# Patient Record
Sex: FEMALE | Race: WHITE | Employment: OTHER | URBAN - METROPOLITAN AREA
[De-identification: names, ages, dates, MRNs, and addresses within clinical notes are randomized per-mention and may not be internally consistent; named-entity substitution may affect disease eponyms.]

---

## 2017-01-19 DIAGNOSIS — E11.22 DM TYPE 2 CAUSING CKD STAGE 3 (HCC): ICD-10-CM

## 2017-01-19 DIAGNOSIS — N18.30 DM TYPE 2 CAUSING CKD STAGE 3 (HCC): ICD-10-CM

## 2017-01-25 RX ORDER — CELECOXIB 200 MG/1
CAPSULE ORAL
Qty: 30 CAPSULE | Refills: 1 | Status: SHIPPED | OUTPATIENT
Start: 2017-01-25 | End: 2017-02-23 | Stop reason: SDUPTHER

## 2017-02-10 ENCOUNTER — OFFICE VISIT (OUTPATIENT)
Dept: FAMILY MEDICINE CLINIC | Age: 77
End: 2017-02-10

## 2017-02-10 VITALS
OXYGEN SATURATION: 84 % | DIASTOLIC BLOOD PRESSURE: 82 MMHG | HEART RATE: 100 BPM | BODY MASS INDEX: 44.52 KG/M2 | RESPIRATION RATE: 12 BRPM | WEIGHT: 277 LBS | HEIGHT: 66 IN | SYSTOLIC BLOOD PRESSURE: 132 MMHG

## 2017-02-10 DIAGNOSIS — Z79.4 TYPE 2 DIABETES MELLITUS WITH STAGE 3 CHRONIC KIDNEY DISEASE, WITH LONG-TERM CURRENT USE OF INSULIN (HCC): ICD-10-CM

## 2017-02-10 DIAGNOSIS — E66.01 MORBID OBESITY WITH BMI OF 40.0-44.9, ADULT (HCC): ICD-10-CM

## 2017-02-10 DIAGNOSIS — E11.22 TYPE 2 DIABETES MELLITUS WITH STAGE 3 CHRONIC KIDNEY DISEASE, WITH LONG-TERM CURRENT USE OF INSULIN (HCC): ICD-10-CM

## 2017-02-10 DIAGNOSIS — J96.01 ACUTE RESPIRATORY FAILURE WITH HYPOXIA (HCC): Primary | ICD-10-CM

## 2017-02-10 DIAGNOSIS — J45.41 MODERATE PERSISTENT ASTHMA WITH ACUTE EXACERBATION: ICD-10-CM

## 2017-02-10 DIAGNOSIS — N18.30 TYPE 2 DIABETES MELLITUS WITH STAGE 3 CHRONIC KIDNEY DISEASE, WITH LONG-TERM CURRENT USE OF INSULIN (HCC): ICD-10-CM

## 2017-02-10 LAB — HBA1C MFR BLD: 6.8 %

## 2017-02-10 PROCEDURE — G8484 FLU IMMUNIZE NO ADMIN: HCPCS | Performed by: INTERNAL MEDICINE

## 2017-02-10 PROCEDURE — 3044F HG A1C LEVEL LT 7.0%: CPT | Performed by: INTERNAL MEDICINE

## 2017-02-10 PROCEDURE — 83036 HEMOGLOBIN GLYCOSYLATED A1C: CPT | Performed by: INTERNAL MEDICINE

## 2017-02-10 PROCEDURE — 96372 THER/PROPH/DIAG INJ SC/IM: CPT | Performed by: INTERNAL MEDICINE

## 2017-02-10 PROCEDURE — 1036F TOBACCO NON-USER: CPT | Performed by: INTERNAL MEDICINE

## 2017-02-10 PROCEDURE — G8427 DOCREV CUR MEDS BY ELIG CLIN: HCPCS | Performed by: INTERNAL MEDICINE

## 2017-02-10 PROCEDURE — 94640 AIRWAY INHALATION TREATMENT: CPT | Performed by: INTERNAL MEDICINE

## 2017-02-10 PROCEDURE — 3017F COLORECTAL CA SCREEN DOC REV: CPT | Performed by: INTERNAL MEDICINE

## 2017-02-10 PROCEDURE — 99215 OFFICE O/P EST HI 40 MIN: CPT | Performed by: INTERNAL MEDICINE

## 2017-02-10 PROCEDURE — G8400 PT W/DXA NO RESULTS DOC: HCPCS | Performed by: INTERNAL MEDICINE

## 2017-02-10 PROCEDURE — 1090F PRES/ABSN URINE INCON ASSESS: CPT | Performed by: INTERNAL MEDICINE

## 2017-02-10 PROCEDURE — 3014F SCREEN MAMMO DOC REV: CPT | Performed by: INTERNAL MEDICINE

## 2017-02-10 PROCEDURE — G8417 CALC BMI ABV UP PARAM F/U: HCPCS | Performed by: INTERNAL MEDICINE

## 2017-02-10 PROCEDURE — 1123F ACP DISCUSS/DSCN MKR DOCD: CPT | Performed by: INTERNAL MEDICINE

## 2017-02-10 PROCEDURE — 4040F PNEUMOC VAC/ADMIN/RCVD: CPT | Performed by: INTERNAL MEDICINE

## 2017-02-10 RX ORDER — ALBUTEROL SULFATE 2.5 MG/3ML
2.5 SOLUTION RESPIRATORY (INHALATION) ONCE
Status: DISCONTINUED | OUTPATIENT
Start: 2017-02-10 | End: 2017-02-16 | Stop reason: ALTCHOICE

## 2017-02-10 RX ORDER — METHYLPREDNISOLONE ACETATE 80 MG/ML
80 INJECTION, SUSPENSION INTRA-ARTICULAR; INTRALESIONAL; INTRAMUSCULAR; SOFT TISSUE ONCE
Status: COMPLETED | OUTPATIENT
Start: 2017-02-10 | End: 2017-02-10

## 2017-02-10 RX ADMIN — METHYLPREDNISOLONE ACETATE 80 MG: 80 INJECTION, SUSPENSION INTRA-ARTICULAR; INTRALESIONAL; INTRAMUSCULAR; SOFT TISSUE at 09:29

## 2017-02-10 ASSESSMENT — ENCOUNTER SYMPTOMS
NAUSEA: 0
SPUTUM PRODUCTION: 1
EYE DISCHARGE: 0
COUGH: 1
BACK PAIN: 1
WHEEZING: 1
SHORTNESS OF BREATH: 1
VOMITING: 0
DIARRHEA: 0
HEMOPTYSIS: 0
EYE REDNESS: 0

## 2017-02-15 ENCOUNTER — EPISODE CHANGES (OUTPATIENT)
Dept: CASE MANAGEMENT | Age: 77
End: 2017-02-15

## 2017-02-15 ENCOUNTER — CARE COORDINATOR VISIT (OUTPATIENT)
Dept: CASE MANAGEMENT | Age: 77
End: 2017-02-15

## 2017-02-16 ENCOUNTER — CARE COORDINATION (OUTPATIENT)
Dept: CASE MANAGEMENT | Age: 77
End: 2017-02-16

## 2017-02-16 ENCOUNTER — TELEPHONE (OUTPATIENT)
Dept: PHARMACY | Facility: CLINIC | Age: 77
End: 2017-02-16

## 2017-02-16 RX ORDER — TOPIRAMATE 100 MG/1
150 TABLET, FILM COATED ORAL 2 TIMES DAILY
COMMUNITY
Start: 2017-01-24 | End: 2017-06-21 | Stop reason: SDUPTHER

## 2017-02-20 ENCOUNTER — TELEPHONE (OUTPATIENT)
Dept: FAMILY MEDICINE CLINIC | Age: 77
End: 2017-02-20

## 2017-02-20 ENCOUNTER — CARE COORDINATION (OUTPATIENT)
Dept: CASE MANAGEMENT | Age: 77
End: 2017-02-20

## 2017-02-21 ENCOUNTER — CARE COORDINATION (OUTPATIENT)
Dept: CASE MANAGEMENT | Age: 77
End: 2017-02-21

## 2017-02-21 ENCOUNTER — OFFICE VISIT (OUTPATIENT)
Dept: FAMILY MEDICINE CLINIC | Age: 77
End: 2017-02-21

## 2017-02-21 VITALS
RESPIRATION RATE: 18 BRPM | BODY MASS INDEX: 43.07 KG/M2 | OXYGEN SATURATION: 95 % | HEIGHT: 66 IN | TEMPERATURE: 98.2 F | WEIGHT: 268 LBS | DIASTOLIC BLOOD PRESSURE: 68 MMHG | HEART RATE: 96 BPM | SYSTOLIC BLOOD PRESSURE: 114 MMHG

## 2017-02-21 DIAGNOSIS — Z86.69 HISTORY OF SLEEP APNEA: ICD-10-CM

## 2017-02-21 DIAGNOSIS — Z09 HOSPITAL DISCHARGE FOLLOW-UP: ICD-10-CM

## 2017-02-21 DIAGNOSIS — J96.01 ACUTE RESPIRATORY FAILURE WITH HYPOXIA (HCC): ICD-10-CM

## 2017-02-21 DIAGNOSIS — J45.901 ASTHMATIC BRONCHITIS WITH ACUTE EXACERBATION: ICD-10-CM

## 2017-02-21 DIAGNOSIS — R06.83 SNORING: ICD-10-CM

## 2017-02-23 ENCOUNTER — TELEPHONE (OUTPATIENT)
Dept: FAMILY MEDICINE CLINIC | Age: 77
End: 2017-02-23

## 2017-02-24 ENCOUNTER — CARE COORDINATION (OUTPATIENT)
Dept: CASE MANAGEMENT | Age: 77
End: 2017-02-24

## 2017-02-27 PROBLEM — N30.00 ACUTE CYSTITIS WITHOUT HEMATURIA: Status: ACTIVE | Noted: 2017-02-27

## 2017-02-27 PROBLEM — R55 SYNCOPE AND COLLAPSE: Status: ACTIVE | Noted: 2017-02-27

## 2017-02-27 PROBLEM — I95.1 ORTHOSTATIC HYPOTENSION: Status: ACTIVE | Noted: 2017-02-27

## 2017-03-03 ENCOUNTER — EPISODE CHANGES (OUTPATIENT)
Dept: CASE MANAGEMENT | Age: 77
End: 2017-03-03

## 2017-03-09 ENCOUNTER — CARE COORDINATION (OUTPATIENT)
Dept: CASE MANAGEMENT | Age: 77
End: 2017-03-09

## 2017-03-14 ENCOUNTER — OFFICE VISIT (OUTPATIENT)
Dept: PULMONOLOGY | Age: 77
End: 2017-03-14

## 2017-03-14 VITALS
DIASTOLIC BLOOD PRESSURE: 62 MMHG | RESPIRATION RATE: 16 BRPM | OXYGEN SATURATION: 94 % | TEMPERATURE: 99 F | SYSTOLIC BLOOD PRESSURE: 118 MMHG | HEART RATE: 93 BPM | BODY MASS INDEX: 44.42 KG/M2 | WEIGHT: 276.4 LBS | HEIGHT: 66 IN

## 2017-03-14 DIAGNOSIS — R06.81 WITNESSED APNEIC SPELLS: Primary | ICD-10-CM

## 2017-03-14 DIAGNOSIS — R06.83 SNORING: ICD-10-CM

## 2017-03-14 DIAGNOSIS — G47.10 HYPERSOMNIA: ICD-10-CM

## 2017-03-14 PROCEDURE — 1036F TOBACCO NON-USER: CPT | Performed by: INTERNAL MEDICINE

## 2017-03-14 PROCEDURE — 1111F DSCHRG MED/CURRENT MED MERGE: CPT | Performed by: INTERNAL MEDICINE

## 2017-03-14 PROCEDURE — 1123F ACP DISCUSS/DSCN MKR DOCD: CPT | Performed by: INTERNAL MEDICINE

## 2017-03-14 PROCEDURE — G8427 DOCREV CUR MEDS BY ELIG CLIN: HCPCS | Performed by: INTERNAL MEDICINE

## 2017-03-14 PROCEDURE — 3014F SCREEN MAMMO DOC REV: CPT | Performed by: INTERNAL MEDICINE

## 2017-03-14 PROCEDURE — G8484 FLU IMMUNIZE NO ADMIN: HCPCS | Performed by: INTERNAL MEDICINE

## 2017-03-14 PROCEDURE — G8417 CALC BMI ABV UP PARAM F/U: HCPCS | Performed by: INTERNAL MEDICINE

## 2017-03-14 PROCEDURE — 4040F PNEUMOC VAC/ADMIN/RCVD: CPT | Performed by: INTERNAL MEDICINE

## 2017-03-14 PROCEDURE — G8400 PT W/DXA NO RESULTS DOC: HCPCS | Performed by: INTERNAL MEDICINE

## 2017-03-14 PROCEDURE — 99203 OFFICE O/P NEW LOW 30 MIN: CPT | Performed by: INTERNAL MEDICINE

## 2017-03-14 PROCEDURE — 3017F COLORECTAL CA SCREEN DOC REV: CPT | Performed by: INTERNAL MEDICINE

## 2017-03-14 PROCEDURE — 1090F PRES/ABSN URINE INCON ASSESS: CPT | Performed by: INTERNAL MEDICINE

## 2017-03-14 RX ORDER — CLONAZEPAM 0.5 MG/1
0.5 TABLET ORAL 3 TIMES DAILY PRN
Status: ON HOLD | COMMUNITY
End: 2017-03-18

## 2017-03-14 ASSESSMENT — SLEEP AND FATIGUE QUESTIONNAIRES
HOW LIKELY ARE YOU TO NOD OFF OR FALL ASLEEP WHILE LYING DOWN TO REST IN THE AFTERNOON WHEN CIRCUMSTANCES PERMIT: 1
HOW LIKELY ARE YOU TO NOD OFF OR FALL ASLEEP WHILE SITTING AND TALKING TO SOMEONE: 0
HOW LIKELY ARE YOU TO NOD OFF OR FALL ASLEEP WHILE SITTING QUIETLY AFTER LUNCH WITHOUT ALCOHOL: 1
HOW LIKELY ARE YOU TO NOD OFF OR FALL ASLEEP WHILE WATCHING TV: 3
HOW LIKELY ARE YOU TO NOD OFF OR FALL ASLEEP WHEN YOU ARE A PASSENGER IN A CAR FOR AN HOUR WITHOUT A BREAK: 3
HOW LIKELY ARE YOU TO NOD OFF OR FALL ASLEEP WHILE SITTING INACTIVE IN A PUBLIC PLACE: 1
HOW LIKELY ARE YOU TO NOD OFF OR FALL ASLEEP IN A CAR, WHILE STOPPED FOR A FEW MINUTES IN TRAFFIC: 0
NECK CIRCUMFERENCE (INCHES): 17.5
HOW LIKELY ARE YOU TO NOD OFF OR FALL ASLEEP WHILE SITTING AND READING: 3
ESS TOTAL SCORE: 12

## 2017-03-15 RX ORDER — MONTELUKAST SODIUM 10 MG/1
TABLET ORAL
Qty: 30 TABLET | Refills: 2 | Status: SHIPPED | OUTPATIENT
Start: 2017-03-15 | End: 2017-06-27 | Stop reason: SDUPTHER

## 2017-03-16 PROCEDURE — 99496 TRANSJ CARE MGMT HIGH F2F 7D: CPT | Performed by: NURSE PRACTITIONER

## 2017-03-22 ENCOUNTER — EPISODE CHANGES (OUTPATIENT)
Dept: CASE MANAGEMENT | Age: 77
End: 2017-03-22

## 2017-03-22 RX ORDER — CELECOXIB 200 MG/1
CAPSULE ORAL
Qty: 30 CAPSULE | Refills: 1 | Status: SHIPPED | OUTPATIENT
Start: 2017-03-22 | End: 2017-05-17 | Stop reason: SDUPTHER

## 2017-03-24 ENCOUNTER — TELEPHONE (OUTPATIENT)
Dept: CARDIOLOGY CLINIC | Age: 77
End: 2017-03-24

## 2017-03-30 ENCOUNTER — CARE COORDINATION (OUTPATIENT)
Dept: CASE MANAGEMENT | Age: 77
End: 2017-03-30

## 2017-04-07 ENCOUNTER — CARE COORDINATION (OUTPATIENT)
Dept: CASE MANAGEMENT | Age: 77
End: 2017-04-07

## 2017-04-11 RX ORDER — DULOXETIN HYDROCHLORIDE 30 MG/1
CAPSULE, DELAYED RELEASE ORAL
Qty: 60 CAPSULE | Refills: 5 | Status: SHIPPED | OUTPATIENT
Start: 2017-04-11 | End: 2017-06-21 | Stop reason: SDUPTHER

## 2017-04-13 ENCOUNTER — CARE COORDINATION (OUTPATIENT)
Dept: CASE MANAGEMENT | Age: 77
End: 2017-04-13

## 2017-04-20 ENCOUNTER — CARE COORDINATION (OUTPATIENT)
Dept: CASE MANAGEMENT | Age: 77
End: 2017-04-20

## 2017-04-21 PROCEDURE — 93272 ECG/REVIEW INTERPRET ONLY: CPT | Performed by: INTERNAL MEDICINE

## 2017-04-21 RX ORDER — LOSARTAN POTASSIUM 25 MG/1
TABLET ORAL
Qty: 30 TABLET | Refills: 6 | Status: ON HOLD | OUTPATIENT
Start: 2017-04-21 | End: 2017-04-26 | Stop reason: HOSPADM

## 2017-04-26 DIAGNOSIS — R55 SYNCOPE AND COLLAPSE: ICD-10-CM

## 2017-04-27 ENCOUNTER — EPISODE CHANGES (OUTPATIENT)
Dept: CASE MANAGEMENT | Age: 77
End: 2017-04-27

## 2017-04-27 ENCOUNTER — CARE COORDINATION (OUTPATIENT)
Dept: CASE MANAGEMENT | Age: 77
End: 2017-04-27

## 2017-05-04 ENCOUNTER — CARE COORDINATION (OUTPATIENT)
Dept: CASE MANAGEMENT | Age: 77
End: 2017-05-04

## 2017-05-04 ENCOUNTER — TELEPHONE (OUTPATIENT)
Dept: FAMILY MEDICINE CLINIC | Age: 77
End: 2017-05-04

## 2017-05-11 ENCOUNTER — CARE COORDINATION (OUTPATIENT)
Dept: CASE MANAGEMENT | Age: 77
End: 2017-05-11

## 2017-05-16 ENCOUNTER — HOSPITAL ENCOUNTER (OUTPATIENT)
Dept: SLEEP MEDICINE | Age: 77
Discharge: HOME | End: 2017-05-16

## 2017-05-16 DIAGNOSIS — R06.83 SNORING: ICD-10-CM

## 2017-05-16 DIAGNOSIS — G47.10 HYPERSOMNIA: ICD-10-CM

## 2017-05-16 DIAGNOSIS — R06.81 WITNESSED APNEIC SPELLS: ICD-10-CM

## 2017-05-17 ENCOUNTER — HOSPITAL ENCOUNTER (OUTPATIENT)
Dept: OTHER | Age: 77
Discharge: HOME | End: 2017-05-18
Attending: INTERNAL MEDICINE | Admitting: INTERNAL MEDICINE

## 2017-05-17 RX ORDER — CELECOXIB 200 MG/1
CAPSULE ORAL
Qty: 30 CAPSULE | Refills: 1 | Status: SHIPPED | OUTPATIENT
Start: 2017-05-17 | End: 2017-06-27 | Stop reason: SDUPTHER

## 2017-05-17 RX ORDER — AMLODIPINE BESYLATE 10 MG/1
TABLET ORAL
Qty: 30 TABLET | Refills: 5 | Status: SHIPPED | OUTPATIENT
Start: 2017-05-17 | End: 2017-06-27 | Stop reason: SDUPTHER

## 2017-05-19 ENCOUNTER — CARE COORDINATION (OUTPATIENT)
Dept: CASE MANAGEMENT | Age: 77
End: 2017-05-19

## 2017-05-19 DIAGNOSIS — G47.33 OSA (OBSTRUCTIVE SLEEP APNEA): Primary | ICD-10-CM

## 2017-06-06 ENCOUNTER — TELEPHONE (OUTPATIENT)
Dept: FAMILY MEDICINE CLINIC | Age: 77
End: 2017-06-06

## 2017-06-15 ENCOUNTER — TELEPHONE (OUTPATIENT)
Dept: PULMONOLOGY | Age: 77
End: 2017-06-15

## 2017-06-21 RX ORDER — HYDROCHLOROTHIAZIDE 25 MG/1
TABLET ORAL
Qty: 30 TABLET | Refills: 0 | Status: SHIPPED | OUTPATIENT
Start: 2017-06-21 | End: 2017-06-27 | Stop reason: SDUPTHER

## 2017-06-21 RX ORDER — GABAPENTIN 100 MG/1
CAPSULE ORAL
Qty: 30 CAPSULE | Refills: 0 | Status: SHIPPED | OUTPATIENT
Start: 2017-06-21 | End: 2017-06-27 | Stop reason: SDUPTHER

## 2017-06-21 RX ORDER — DULOXETIN HYDROCHLORIDE 30 MG/1
CAPSULE, DELAYED RELEASE ORAL
Qty: 60 CAPSULE | Refills: 0 | Status: SHIPPED | OUTPATIENT
Start: 2017-06-21 | End: 2017-06-27 | Stop reason: SDUPTHER

## 2017-06-21 RX ORDER — ATORVASTATIN CALCIUM 40 MG/1
TABLET, FILM COATED ORAL
Qty: 45 TABLET | Refills: 0 | Status: SHIPPED | OUTPATIENT
Start: 2017-06-21 | End: 2017-06-27 | Stop reason: SDUPTHER

## 2017-06-21 RX ORDER — LOSARTAN POTASSIUM 50 MG/1
TABLET ORAL
Qty: 30 TABLET | Refills: 0 | Status: SHIPPED | OUTPATIENT
Start: 2017-06-21 | End: 2017-06-27 | Stop reason: SDUPTHER

## 2017-06-21 RX ORDER — TOPIRAMATE 100 MG/1
TABLET, FILM COATED ORAL
Qty: 90 TABLET | Refills: 0 | Status: SHIPPED | OUTPATIENT
Start: 2017-06-21 | End: 2017-06-27 | Stop reason: SDUPTHER

## 2017-06-27 ENCOUNTER — OFFICE VISIT (OUTPATIENT)
Dept: FAMILY MEDICINE CLINIC | Age: 77
End: 2017-06-27

## 2017-06-27 ENCOUNTER — TELEPHONE (OUTPATIENT)
Dept: PULMONOLOGY | Age: 77
End: 2017-06-27

## 2017-06-27 VITALS
SYSTOLIC BLOOD PRESSURE: 120 MMHG | WEIGHT: 288 LBS | OXYGEN SATURATION: 94 % | DIASTOLIC BLOOD PRESSURE: 70 MMHG | HEART RATE: 103 BPM | BODY MASS INDEX: 46.48 KG/M2

## 2017-06-27 DIAGNOSIS — I10 ESSENTIAL HYPERTENSION: ICD-10-CM

## 2017-06-27 DIAGNOSIS — E78.00 PURE HYPERCHOLESTEROLEMIA: ICD-10-CM

## 2017-06-27 DIAGNOSIS — E11.9 TYPE 2 DIABETES MELLITUS WITHOUT COMPLICATION, WITHOUT LONG-TERM CURRENT USE OF INSULIN (HCC): Primary | ICD-10-CM

## 2017-06-27 DIAGNOSIS — G25.0 TREMOR, ESSENTIAL: ICD-10-CM

## 2017-06-27 LAB — HBA1C MFR BLD: 7.3 %

## 2017-06-27 PROCEDURE — 99214 OFFICE O/P EST MOD 30 MIN: CPT | Performed by: INTERNAL MEDICINE

## 2017-06-27 PROCEDURE — 1123F ACP DISCUSS/DSCN MKR DOCD: CPT | Performed by: INTERNAL MEDICINE

## 2017-06-27 PROCEDURE — 3046F HEMOGLOBIN A1C LEVEL >9.0%: CPT | Performed by: INTERNAL MEDICINE

## 2017-06-27 PROCEDURE — 1036F TOBACCO NON-USER: CPT | Performed by: INTERNAL MEDICINE

## 2017-06-27 PROCEDURE — 4040F PNEUMOC VAC/ADMIN/RCVD: CPT | Performed by: INTERNAL MEDICINE

## 2017-06-27 PROCEDURE — 83036 HEMOGLOBIN GLYCOSYLATED A1C: CPT | Performed by: INTERNAL MEDICINE

## 2017-06-27 PROCEDURE — 1090F PRES/ABSN URINE INCON ASSESS: CPT | Performed by: INTERNAL MEDICINE

## 2017-06-27 PROCEDURE — G8417 CALC BMI ABV UP PARAM F/U: HCPCS | Performed by: INTERNAL MEDICINE

## 2017-06-27 PROCEDURE — 3017F COLORECTAL CA SCREEN DOC REV: CPT | Performed by: INTERNAL MEDICINE

## 2017-06-27 PROCEDURE — G8598 ASA/ANTIPLAT THER USED: HCPCS | Performed by: INTERNAL MEDICINE

## 2017-06-27 PROCEDURE — 3014F SCREEN MAMMO DOC REV: CPT | Performed by: INTERNAL MEDICINE

## 2017-06-27 PROCEDURE — G8428 CUR MEDS NOT DOCUMENT: HCPCS | Performed by: INTERNAL MEDICINE

## 2017-06-27 PROCEDURE — G8400 PT W/DXA NO RESULTS DOC: HCPCS | Performed by: INTERNAL MEDICINE

## 2017-06-27 RX ORDER — HYDROCHLOROTHIAZIDE 25 MG/1
TABLET ORAL
Qty: 90 TABLET | Refills: 1 | Status: SHIPPED | OUTPATIENT
Start: 2017-06-27 | End: 2017-12-08 | Stop reason: SDUPTHER

## 2017-06-27 RX ORDER — MONTELUKAST SODIUM 10 MG/1
TABLET ORAL
Qty: 90 TABLET | Refills: 1 | Status: SHIPPED | OUTPATIENT
Start: 2017-06-27 | End: 2017-12-08 | Stop reason: SDUPTHER

## 2017-06-27 RX ORDER — DULOXETIN HYDROCHLORIDE 60 MG/1
CAPSULE, DELAYED RELEASE ORAL
Qty: 90 CAPSULE | Refills: 1 | Status: SHIPPED | OUTPATIENT
Start: 2017-06-27 | End: 2017-10-10 | Stop reason: SDUPTHER

## 2017-06-27 RX ORDER — TOPIRAMATE 100 MG/1
TABLET, FILM COATED ORAL
Qty: 90 TABLET | Refills: 1 | Status: SHIPPED | OUTPATIENT
Start: 2017-06-27 | End: 2018-01-22 | Stop reason: SDUPTHER

## 2017-06-27 RX ORDER — LOSARTAN POTASSIUM 50 MG/1
TABLET ORAL
Qty: 90 TABLET | Refills: 1 | Status: SHIPPED | OUTPATIENT
Start: 2017-06-27 | End: 2017-12-08 | Stop reason: SDUPTHER

## 2017-06-27 RX ORDER — GABAPENTIN 100 MG/1
CAPSULE ORAL
Qty: 90 CAPSULE | Refills: 1 | Status: SHIPPED | OUTPATIENT
Start: 2017-06-27 | End: 2017-12-08 | Stop reason: SDUPTHER

## 2017-06-27 RX ORDER — ATORVASTATIN CALCIUM 40 MG/1
TABLET, FILM COATED ORAL
Qty: 135 TABLET | Refills: 1 | Status: SHIPPED | OUTPATIENT
Start: 2017-06-27 | End: 2017-11-09 | Stop reason: SDUPTHER

## 2017-06-27 RX ORDER — CELECOXIB 200 MG/1
CAPSULE ORAL
Qty: 90 CAPSULE | Refills: 1 | Status: SHIPPED | OUTPATIENT
Start: 2017-06-27 | End: 2017-12-08 | Stop reason: SDUPTHER

## 2017-06-27 RX ORDER — CLONAZEPAM 0.5 MG/1
0.25 TABLET ORAL 3 TIMES DAILY PRN
Qty: 21 TABLET | Refills: 3 | Status: CANCELLED | OUTPATIENT
Start: 2017-06-27

## 2017-06-27 RX ORDER — AMLODIPINE BESYLATE 10 MG/1
TABLET ORAL
Qty: 90 TABLET | Refills: 1 | Status: SHIPPED | OUTPATIENT
Start: 2017-06-27 | End: 2017-07-31 | Stop reason: SDUPTHER

## 2017-07-03 ENCOUNTER — HOSPITAL ENCOUNTER (OUTPATIENT)
Dept: MAMMOGRAPHY | Age: 77
Discharge: HOME | End: 2017-07-03
Attending: INTERNAL MEDICINE | Admitting: INTERNAL MEDICINE

## 2017-07-03 ENCOUNTER — HOSPITAL ENCOUNTER (OUTPATIENT)
Dept: OTHER | Age: 77
Discharge: HOME | End: 2017-07-03
Attending: INTERNAL MEDICINE | Admitting: INTERNAL MEDICINE

## 2017-07-03 DIAGNOSIS — E78.00 PURE HYPERCHOLESTEROLEMIA: ICD-10-CM

## 2017-07-03 DIAGNOSIS — Z12.31 VISIT FOR SCREENING MAMMOGRAM: ICD-10-CM

## 2017-07-03 LAB
CHOLESTEROL, TOTAL: 186 MG/DL (ref 0–199)
HDLC SERPL-MCNC: 75 MG/DL (ref 40–60)
LDL CHOLESTEROL CALCULATED: 87 MG/DL
TRIGL SERPL-MCNC: 121 MG/DL (ref 0–150)
VLDLC SERPL CALC-MCNC: 24 MG/DL

## 2017-07-18 ENCOUNTER — OFFICE VISIT (OUTPATIENT)
Dept: FAMILY MEDICINE CLINIC | Age: 77
End: 2017-07-18

## 2017-07-18 VITALS
BODY MASS INDEX: 45.45 KG/M2 | WEIGHT: 281.6 LBS | OXYGEN SATURATION: 95 % | DIASTOLIC BLOOD PRESSURE: 80 MMHG | SYSTOLIC BLOOD PRESSURE: 120 MMHG | HEART RATE: 98 BPM

## 2017-07-18 DIAGNOSIS — R53.1 GENERAL WEAKNESS: ICD-10-CM

## 2017-07-18 DIAGNOSIS — E11.22 TYPE 2 DIABETES MELLITUS WITH STAGE 3 CHRONIC KIDNEY DISEASE, UNSPECIFIED LONG TERM INSULIN USE STATUS: Primary | ICD-10-CM

## 2017-07-18 DIAGNOSIS — N18.3 TYPE 2 DIABETES MELLITUS WITH STAGE 3 CHRONIC KIDNEY DISEASE, UNSPECIFIED LONG TERM INSULIN USE STATUS: Primary | ICD-10-CM

## 2017-07-18 PROCEDURE — 3014F SCREEN MAMMO DOC REV: CPT | Performed by: INTERNAL MEDICINE

## 2017-07-18 PROCEDURE — 3017F COLORECTAL CA SCREEN DOC REV: CPT | Performed by: INTERNAL MEDICINE

## 2017-07-18 PROCEDURE — 1090F PRES/ABSN URINE INCON ASSESS: CPT | Performed by: INTERNAL MEDICINE

## 2017-07-18 PROCEDURE — 99212 OFFICE O/P EST SF 10 MIN: CPT | Performed by: INTERNAL MEDICINE

## 2017-07-18 PROCEDURE — 3046F HEMOGLOBIN A1C LEVEL >9.0%: CPT | Performed by: INTERNAL MEDICINE

## 2017-07-18 PROCEDURE — 1036F TOBACCO NON-USER: CPT | Performed by: INTERNAL MEDICINE

## 2017-07-18 PROCEDURE — G8598 ASA/ANTIPLAT THER USED: HCPCS | Performed by: INTERNAL MEDICINE

## 2017-07-18 PROCEDURE — G8400 PT W/DXA NO RESULTS DOC: HCPCS | Performed by: INTERNAL MEDICINE

## 2017-07-18 PROCEDURE — G8427 DOCREV CUR MEDS BY ELIG CLIN: HCPCS | Performed by: INTERNAL MEDICINE

## 2017-07-18 PROCEDURE — G8417 CALC BMI ABV UP PARAM F/U: HCPCS | Performed by: INTERNAL MEDICINE

## 2017-07-18 PROCEDURE — 4040F PNEUMOC VAC/ADMIN/RCVD: CPT | Performed by: INTERNAL MEDICINE

## 2017-07-18 PROCEDURE — 1123F ACP DISCUSS/DSCN MKR DOCD: CPT | Performed by: INTERNAL MEDICINE

## 2017-07-18 RX ORDER — FLUTICASONE PROPIONATE 50 MCG
SPRAY, SUSPENSION (ML) NASAL
Qty: 16 G | Refills: 5 | Status: SHIPPED | OUTPATIENT
Start: 2017-07-18 | End: 2018-02-02 | Stop reason: SDUPTHER

## 2017-07-18 RX ORDER — ALBUTEROL SULFATE 90 UG/1
AEROSOL, METERED RESPIRATORY (INHALATION)
Qty: 1 INHALER | Refills: 5 | Status: SHIPPED | OUTPATIENT
Start: 2017-07-18 | End: 2018-01-22 | Stop reason: CLARIF

## 2017-07-20 RX ORDER — ATORVASTATIN CALCIUM 40 MG/1
TABLET, FILM COATED ORAL
Qty: 45 TABLET | Refills: 5 | Status: SHIPPED | OUTPATIENT
Start: 2017-07-20 | End: 2017-12-08 | Stop reason: SDUPTHER

## 2017-07-20 RX ORDER — MOMETASONE FUROATE 200 UG/1
AEROSOL RESPIRATORY (INHALATION)
Qty: 13 INHALER | Refills: 5 | Status: SHIPPED | OUTPATIENT
Start: 2017-07-20 | End: 2017-12-14 | Stop reason: SDUPTHER

## 2017-07-20 RX ORDER — DULOXETIN HYDROCHLORIDE 30 MG/1
CAPSULE, DELAYED RELEASE ORAL
Qty: 60 CAPSULE | Refills: 5 | Status: SHIPPED | OUTPATIENT
Start: 2017-07-20 | End: 2018-01-22 | Stop reason: DRUGHIGH

## 2017-07-20 RX ORDER — TOPIRAMATE 100 MG/1
TABLET, FILM COATED ORAL
Qty: 90 TABLET | Refills: 5 | Status: SHIPPED | OUTPATIENT
Start: 2017-07-20 | End: 2017-10-23 | Stop reason: SDUPTHER

## 2017-07-31 LAB — DIABETIC RETINOPATHY: NORMAL

## 2017-07-31 RX ORDER — AMLODIPINE BESYLATE 10 MG/1
TABLET ORAL
Qty: 90 TABLET | Refills: 0 | Status: SHIPPED | OUTPATIENT
Start: 2017-07-31 | End: 2017-12-07 | Stop reason: SDUPTHER

## 2017-08-01 RX ORDER — LIDOCAINE 50 MG/G
OINTMENT TOPICAL
Qty: 50 G | Refills: 5 | Status: SHIPPED | OUTPATIENT
Start: 2017-08-01 | End: 2018-07-16

## 2017-08-02 RX ORDER — DULOXETIN HYDROCHLORIDE 60 MG/1
60 CAPSULE, DELAYED RELEASE ORAL 2 TIMES DAILY
Qty: 180 CAPSULE | Refills: 1 | OUTPATIENT
Start: 2017-08-02

## 2017-08-09 ENCOUNTER — TELEPHONE (OUTPATIENT)
Dept: PULMONOLOGY | Age: 77
End: 2017-08-09

## 2017-08-09 ENCOUNTER — OFFICE VISIT (OUTPATIENT)
Dept: PULMONOLOGY | Age: 77
End: 2017-08-09

## 2017-08-09 VITALS
WEIGHT: 288 LBS | TEMPERATURE: 97.3 F | HEART RATE: 78 BPM | RESPIRATION RATE: 16 BRPM | HEIGHT: 66 IN | BODY MASS INDEX: 46.28 KG/M2 | SYSTOLIC BLOOD PRESSURE: 135 MMHG | OXYGEN SATURATION: 98 % | DIASTOLIC BLOOD PRESSURE: 61 MMHG

## 2017-08-09 DIAGNOSIS — G47.33 OSA (OBSTRUCTIVE SLEEP APNEA): Primary | ICD-10-CM

## 2017-08-09 DIAGNOSIS — E66.01 OBESITY, CLASS III, BMI 40-49.9 (MORBID OBESITY) (HCC): ICD-10-CM

## 2017-08-09 PROCEDURE — 3017F COLORECTAL CA SCREEN DOC REV: CPT | Performed by: NURSE PRACTITIONER

## 2017-08-09 PROCEDURE — 1036F TOBACCO NON-USER: CPT | Performed by: NURSE PRACTITIONER

## 2017-08-09 PROCEDURE — G8427 DOCREV CUR MEDS BY ELIG CLIN: HCPCS | Performed by: NURSE PRACTITIONER

## 2017-08-09 PROCEDURE — 1123F ACP DISCUSS/DSCN MKR DOCD: CPT | Performed by: NURSE PRACTITIONER

## 2017-08-09 PROCEDURE — 99213 OFFICE O/P EST LOW 20 MIN: CPT | Performed by: NURSE PRACTITIONER

## 2017-08-09 PROCEDURE — G8400 PT W/DXA NO RESULTS DOC: HCPCS | Performed by: NURSE PRACTITIONER

## 2017-08-09 PROCEDURE — 1090F PRES/ABSN URINE INCON ASSESS: CPT | Performed by: NURSE PRACTITIONER

## 2017-08-09 PROCEDURE — 4040F PNEUMOC VAC/ADMIN/RCVD: CPT | Performed by: NURSE PRACTITIONER

## 2017-08-09 PROCEDURE — G8598 ASA/ANTIPLAT THER USED: HCPCS | Performed by: NURSE PRACTITIONER

## 2017-08-09 PROCEDURE — G8417 CALC BMI ABV UP PARAM F/U: HCPCS | Performed by: NURSE PRACTITIONER

## 2017-08-09 ASSESSMENT — SLEEP AND FATIGUE QUESTIONNAIRES
HOW LIKELY ARE YOU TO NOD OFF OR FALL ASLEEP WHILE SITTING INACTIVE IN A PUBLIC PLACE: 0
HOW LIKELY ARE YOU TO NOD OFF OR FALL ASLEEP WHILE WATCHING TV: 0
HOW LIKELY ARE YOU TO NOD OFF OR FALL ASLEEP WHILE SITTING AND TALKING TO SOMEONE: 0
HOW LIKELY ARE YOU TO NOD OFF OR FALL ASLEEP WHILE SITTING QUIETLY AFTER LUNCH WITHOUT ALCOHOL: 1
HOW LIKELY ARE YOU TO NOD OFF OR FALL ASLEEP WHILE LYING DOWN TO REST IN THE AFTERNOON WHEN CIRCUMSTANCES PERMIT: 0
ESS TOTAL SCORE: 1
HOW LIKELY ARE YOU TO NOD OFF OR FALL ASLEEP IN A CAR, WHILE STOPPED FOR A FEW MINUTES IN TRAFFIC: 0
NECK CIRCUMFERENCE (INCHES): 17.5
HOW LIKELY ARE YOU TO NOD OFF OR FALL ASLEEP WHEN YOU ARE A PASSENGER IN A CAR FOR AN HOUR WITHOUT A BREAK: 0
HOW LIKELY ARE YOU TO NOD OFF OR FALL ASLEEP WHILE SITTING AND READING: 0

## 2017-09-07 NOTE — TELEPHONE ENCOUNTER
Shawn Luis at [facility]. She said the order she has does say 22/15. She is going to check with their resp therapist to make sure patient's machine is set to that.

## 2017-09-07 NOTE — TELEPHONE ENCOUNTER
BIPAP compliance from 8/6/17-9/4/17 reviewed. Compliance is good 73%, AHI is elevated 15. Compliance report shows pressure of BIPAP 10/4 cm H2O per notes and orders and BIPAP titration pressure should be BIPAP 22/15 cm H2O. Please call DME to fix. They should already have correct order. Please look for new compliance in about 4-6 weeks.   Thank you

## 2017-09-19 ENCOUNTER — CARE COORDINATION (OUTPATIENT)
Dept: CARE COORDINATION | Age: 77
End: 2017-09-19

## 2017-10-12 RX ORDER — DULOXETIN HYDROCHLORIDE 60 MG/1
CAPSULE, DELAYED RELEASE ORAL
Qty: 90 CAPSULE | Refills: 1 | Status: SHIPPED | OUTPATIENT
Start: 2017-10-12 | End: 2017-10-20 | Stop reason: SDUPTHER

## 2017-10-22 RX ORDER — DULOXETIN HYDROCHLORIDE 60 MG/1
CAPSULE, DELAYED RELEASE ORAL
Qty: 90 CAPSULE | Refills: 1 | Status: SHIPPED | OUTPATIENT
Start: 2017-10-22 | End: 2017-11-09 | Stop reason: SDUPTHER

## 2017-10-25 RX ORDER — TOPIRAMATE 100 MG/1
TABLET, FILM COATED ORAL
Qty: 270 TABLET | Refills: 0 | Status: SHIPPED | OUTPATIENT
Start: 2017-10-25 | End: 2017-11-09 | Stop reason: SDUPTHER

## 2017-11-09 ENCOUNTER — OFFICE VISIT (OUTPATIENT)
Dept: PULMONOLOGY | Age: 77
End: 2017-11-09

## 2017-11-09 VITALS
OXYGEN SATURATION: 95 % | TEMPERATURE: 97.7 F | HEIGHT: 66 IN | RESPIRATION RATE: 24 BRPM | BODY MASS INDEX: 46.77 KG/M2 | DIASTOLIC BLOOD PRESSURE: 59 MMHG | WEIGHT: 291 LBS | SYSTOLIC BLOOD PRESSURE: 138 MMHG | HEART RATE: 87 BPM

## 2017-11-09 DIAGNOSIS — E66.01 OBESITY, CLASS III, BMI 40-49.9 (MORBID OBESITY) (HCC): ICD-10-CM

## 2017-11-09 DIAGNOSIS — G47.33 OSA (OBSTRUCTIVE SLEEP APNEA): Primary | ICD-10-CM

## 2017-11-09 PROCEDURE — 3017F COLORECTAL CA SCREEN DOC REV: CPT | Performed by: NURSE PRACTITIONER

## 2017-11-09 PROCEDURE — G8427 DOCREV CUR MEDS BY ELIG CLIN: HCPCS | Performed by: NURSE PRACTITIONER

## 2017-11-09 PROCEDURE — 4040F PNEUMOC VAC/ADMIN/RCVD: CPT | Performed by: NURSE PRACTITIONER

## 2017-11-09 PROCEDURE — G8484 FLU IMMUNIZE NO ADMIN: HCPCS | Performed by: NURSE PRACTITIONER

## 2017-11-09 PROCEDURE — 1090F PRES/ABSN URINE INCON ASSESS: CPT | Performed by: NURSE PRACTITIONER

## 2017-11-09 PROCEDURE — 1036F TOBACCO NON-USER: CPT | Performed by: NURSE PRACTITIONER

## 2017-11-09 PROCEDURE — G8400 PT W/DXA NO RESULTS DOC: HCPCS | Performed by: NURSE PRACTITIONER

## 2017-11-09 PROCEDURE — G8417 CALC BMI ABV UP PARAM F/U: HCPCS | Performed by: NURSE PRACTITIONER

## 2017-11-09 PROCEDURE — 1123F ACP DISCUSS/DSCN MKR DOCD: CPT | Performed by: NURSE PRACTITIONER

## 2017-11-09 PROCEDURE — 99213 OFFICE O/P EST LOW 20 MIN: CPT | Performed by: NURSE PRACTITIONER

## 2017-11-09 PROCEDURE — G8598 ASA/ANTIPLAT THER USED: HCPCS | Performed by: NURSE PRACTITIONER

## 2017-11-09 ASSESSMENT — SLEEP AND FATIGUE QUESTIONNAIRES
HOW LIKELY ARE YOU TO NOD OFF OR FALL ASLEEP WHILE WATCHING TV: 1
HOW LIKELY ARE YOU TO NOD OFF OR FALL ASLEEP WHILE SITTING AND READING: 0
ESS TOTAL SCORE: 1
HOW LIKELY ARE YOU TO NOD OFF OR FALL ASLEEP WHILE SITTING QUIETLY AFTER LUNCH WITHOUT ALCOHOL: 0
HOW LIKELY ARE YOU TO NOD OFF OR FALL ASLEEP WHEN YOU ARE A PASSENGER IN A CAR FOR AN HOUR WITHOUT A BREAK: 0
HOW LIKELY ARE YOU TO NOD OFF OR FALL ASLEEP WHILE LYING DOWN TO REST IN THE AFTERNOON WHEN CIRCUMSTANCES PERMIT: 0
HOW LIKELY ARE YOU TO NOD OFF OR FALL ASLEEP IN A CAR, WHILE STOPPED FOR A FEW MINUTES IN TRAFFIC: 0
NECK CIRCUMFERENCE (INCHES): 17.25
HOW LIKELY ARE YOU TO NOD OFF OR FALL ASLEEP WHILE SITTING AND TALKING TO SOMEONE: 0
HOW LIKELY ARE YOU TO NOD OFF OR FALL ASLEEP WHILE SITTING INACTIVE IN A PUBLIC PLACE: 0

## 2017-11-09 NOTE — PROGRESS NOTES
back pain with right-sided sciatica 8/9/2016    CKD (chronic kidney disease) stage 3, GFR 30-59 ml/min     CRI     Detached retina, left     Diverticulosis     DJD (degenerative joint disease)     Edema     chronic    Fatty liver     GERD (gastroesophageal reflux disease)     Herniated lumbar intervertebral disc     Hyperlipidemia     Hypertension     Hypothyroid 1/27/2015    Morbid obesity with BMI of 40.0-44.9, adult ([facility] [address] 2/10/2017    BETSY treated with BiPAP     Restless leg syndrome     Restless legs syndrome     Sleep apnea     Tremor, essential 8/16/2010    Type II or unspecified type diabetes mellitus without mention of complication, not stated as uncontrolled        Past Surgical History:        Procedure Laterality Date    APPENDECTOMY      CAROTID ENDARTERECTOMY      left    CATARACT REMOVAL      CERVICAL FUSION  11/2010    CHOLECYSTECTOMY      CORNEAL TRANSPLANT      EYE SURGERY      HERNIA REPAIR      HYSTERECTOMY      KNEE SURGERY      replacement x 2    SHOULDER SURGERY      TONSILLECTOMY      TUBAL LIGATION      VITRECTOMY         Allergies:  is allergic to pyridium [phenazopyridine hcl]; reglan [metoclopramide hcl]; and reglan [metoclopramide hcl]. Social History:    TOBACCO:   reports that she has never smoked. She has never used smokeless tobacco.  ETOH:   reports that she does not drink alcohol.     Family History:       Problem Relation Age of Onset    Diabetes Mother     Heart Disease Mother     Diabetes Father     Heart Disease Father     Diabetes Sister        Current Medications:    Current Outpatient Prescriptions:     lidocaine (XYLOCAINE) 5 % ointment, Apply topically every 3 hours as needed for hip pain, Disp: 50 g, Rfl: 5    carbidopa-levodopa (SINEMET)  MG per tablet, TAKE 1 TABLET BY MOUTH 3 TIMES A DAY, Disp: 90 tablet, Rfl: 0    amLODIPine (NORVASC) 10 MG tablet, TAKE 1 TABLET BY MOUTH EVERY DAY, Disp: 90 tablet, Rfl: 0    DULoxetine (CYMBALTA) 30 MG extended release capsule, TAKE ONE CAPSULE BY MOUTH TWICE A DAY, Disp: 60 capsule, Rfl: 5    atorvastatin (LIPITOR) 40 MG tablet, TAKE 1+1/2 TABLET BY MOUTH EVERY EVENING, Disp: 45 tablet, Rfl: 5    ASMANEX  MCG/ACT AERO inhaler, INHALE 2 PUFFS BY INHALATION TWICE DAILY, Disp: 13 Inhaler, Rfl: 5    albuterol sulfate HFA (PROAIR HFA) 108 (90 Base) MCG/ACT inhaler, INHALE 2 PUFFS INTO THE LUNGS EVERY 6 HOURS AS NEEDED FOR WHEEZING., Disp: 1 Inhaler, Rfl: 5    salmeterol (SEREVENT DISKUS) 50 MCG/DOSE diskus inhaler, Inhale 1 puff into the lungs 2 times daily, Disp: 1 each, Rfl: 5    fluticasone (FLONASE) 50 MCG/ACT nasal spray, USE 2 PUFFS IN EACH NOSTRIL DAILY, Disp: 16 g, Rfl: 5    glucose blood VI test strips (QUIQUE CONTOUR TEST) strip, CHECK GLUCOSE DAILY AS NEEDED, Disp: 100 strip, Rfl: 2    gabapentin (NEURONTIN) 100 MG capsule, TAKE ONE CAPSULE BY MOUTH EVERY DAY, Disp: 90 capsule, Rfl: 1    losartan (COZAAR) 50 MG tablet, TAKE 1 TABLET BY MOUTH EVERY DAY, Disp: 90 tablet, Rfl: 1    hydrochlorothiazide (HYDRODIURIL) 25 MG tablet, TAKE 1 TABLET BY MOUTH EVERY DAY, Disp: 90 tablet, Rfl: 1    topiramate (TOPAMAX) 100 MG tablet, TAKE 1+1/2 TABLET BY MOUTH TWICE DAILY, Disp: 90 tablet, Rfl: 1    celecoxib (CELEBREX) 200 MG capsule, TAKE 1 CAPSULE BY MOUTH DAILY, Disp: 90 capsule, Rfl: 1    montelukast (SINGULAIR) 10 MG tablet, TAKE 1 TABLET BY MOUTH DAILY, Disp: 90 tablet, Rfl: 1    clonazePAM (KLONOPIN) 0.5 MG tablet, Take 0.5 tablets by mouth 3 times daily as needed for Anxiety, Disp: 21 tablet, Rfl: 3    mometasone (ASMANEX 120 METERED DOSES) 220 MCG/INH inhaler, Inhale 2 puffs into the lungs 2 times daily, Disp: 1 Inhaler, Rfl: 6    Multiple Vitamins-Minerals (THERAPEUTIC MULTIVITAMIN-MINERALS) tablet, Take 1 tablet by mouth daily, Disp: , Rfl:     Alpha-Lipoic Acid 300 MG CAPS, Take 300 mg by mouth daily, Disp: , Rfl:     Lift Chair MISC, Use daily due to leg weakness. , Disp: 1 each, Rfl: 0    Handicap Placard MISC, by Does not apply route. 12/12/14-12/12/19, Disp: 1 each, Rfl: 0    aspirin EC 81 MG EC tablet, Take 1 tablet by mouth daily. Start taking next week. Indications: OTC, Disp: 30 tablet, Rfl: 2      Objective:   PHYSICAL EXAM:    BP (!) 138/59 (Site: Left Arm, Position: Sitting)   Pulse 87   Temp 97.7 °F (36.5 °C)   Resp 24   Ht 5' 6\" (1.676 m)   Wt 291 lb (132 kg)   SpO2 95% Comment: RA  BMI 46.97 kg/m²     Physical exam:  Gen: No acute distress. Obese. BMI of 46.97  Eyes: PERRL. No sclera icterus. No conjunctival injection. ENT: No discharge. Pharynx clear. Mallampati class IV. Neck: Trachea midline. No obvious mass. Neck circumference 17.5\"  Resp: No accessory muscle use. No crackles. No wheezes. No rhonchi. CV: Regular rate. Regular rhythm. No murmur or rub. Skin: Warm and dry. M/S: No cyanosis. No obvious joint deformity. Using wheelchair  Neuro: Awake. Alert. Moves all four extremities. Psych: Oriented x 3. No anxiety. DATA reviewed by me:   5/6/17 Split night PSG AHI 59.8, low SpO2 52%, PLMS 86.9, Improved sleep related breathing with BiPAP, recommendations BIPAP 22/15 with 1 LO2 bleed in      Assessment:      · Severe BETSY- BIPAP 22/15 cm H2O with 1 LO2 bleed in.  ?compliance and efficacy. Patient reports using BIPAP nightly and improvement of symptoms. ? BIPAP recording data properly  · Obesity  · Snoring, hypersomnia- resolved with BIPAP    Plan:       - Continue BIPAP 22/15 cm H2O with 1 LO2 bleed in  -Order heated tubing  -Mask fitting at [facility] (patient did not bring tubing to office visit)  - Advised to use BiPAP 6-8 hrs at night and during naps. - Replacement of mask, tubing, head straps every 3-6 months or sooner if damaged. - Patient instructed to contact [employer] company for any mask, tubing or machine trouble shooting if problems arise.  - Sleep hygiene  - Avoid sedatives, alcohol and caffeinated drinks at bed time.   - Patient counseled to never drive or operate heavy machinery while fatigue, drowsy or sleepy.    - Weight loss is recommended as a long-term intervention.    - Complications of BETSY if not treated were discussed with patient patient, including: systemic hypertension, pulmonary hypertension, cardiovascular morbidities, car accidents and all cause mortality.  -Please bring BIPAP and all supplies to each visit  -Patient education handout provided regarding sleep tips and CPAP cleaning recommendations     Follow up: 1 year, sooner if needed

## 2017-11-09 NOTE — PROGRESS NOTES
Orders verbalized by Arsalan Bryan CNP;  1 yr F/U  Supply order faxed to [facility]: Full face, heated tubing

## 2017-11-09 NOTE — PATIENT INSTRUCTIONS
Please keep all of your future appointments scheduled by [facility] Pulmonary and Sleep. You should have a follow up appointment scheduled with a sleep medicine provider within 12 months. Routine parts, masks, tubing and filters should all be obtainable from your DME (equipment) provider. Any problems related to mask fit, comfort or functioning of equipment should also be addressed directly with your DME provider. If you are unable to resolve problems, then please notify our office and schedule an appointment to be seen sooner than the usual follow up appointment. For all patients who are evaluated for sleep disorders, never drive or operate motorized equipment while sleepy, fatigued or groggy. Please bring in all of your sleep equipment to all of your appointments, including machine, mask, cords and hoses. Here are some tips to to getting better sleep  1- Avoid napping during the day: This will ensure you are tired at bedtime. If you have to take a nap, sleep less than one hour, before 3 pm.   2- Exercise regularly, but not right before bed: but the timing of the workout is important. Exercising in the morning or early afternoon will not interfere with sleep. Exercising within two hours before bedtime can decrease your ability to fall asleep. Regular exercise is recommended to help you deepen the sleep. 3- Avoid heavy, spicy, or sugary foods 4-6 hours before bedtime: These can affect your ability to stay asleep. 4- Have a light snack before bed: Having an empty stomach can interfere with your sleep. Dairy products and turkey contain tryptophan, which acts as a natural sleep inducer. 5- Stay away from caffeine, nicotine and alcohol at least 4-6 hours before bed: Caffeine and nicotine are stimulants that interfere with your ability to fall asleep.  While alcohol has an immediate sleep-inducing effect, a few hours later, as alcohol levels in your blood start to fall, there is a stimulant effect and you will experience fragmented sleep. 6- Take a hot bath 90 minutes before bedtime:  A hot bath will raise your body temperature, but it is the drop in body temperature that may leave you feeling sleepy  7- Develop sleep rituals: it is important to give your body cues that it is time to slow down and sleep. Listen to relaxing music, read something soothing for 15 minutes, have a cup of caffeine free tea, or do relaxation exercises such as yoga or deep breathing help relieve anxiety and reduce muscle tension. 8- Fix a bedtime and an awakening time: Even on weekends! When your sleep cycle has a regular rhythm, you will feel better. 9- Sleep only when sleepy: This reduces the time you are awake in bed. 10- Get into your favorite sleeping position: If you can't fall asleep within 15-30 minutes, get up and do something boring until you feel sleepy. Sit quietly in the dark or read the warranty on your refrigerator. Don't expose yourself to bright light while you are up, it gives cues to your brain that it is time to wake up. 11- Only use your bed for sleeping: Dont use the bed as an office, workroom or recreation room. Let your body \"know\" that the bed is associated with sleeping  12- Use comfortable bedding. Uncomfortable bedding can prevent good sleep. Evaluate whether or not this is a source of your problem, and make appropriate changes. 13- Make sure your bed and bedroom are quiet and comfortable: A hot room can be uncomfortable. A cooler room, along with enough blankets to stay warm is recommended. Get a blackout shade or wear a slumber mask and wear earplugs or get a \"white noise\" machine for light and noise distractions. 14- Use sunlight to set your biological clock: When you get up in the morning, go outside and turn your face to the sun for 15 minutes. 13- Dont take your worries to bed: Leave worries about job, school, daily life, etc., behind when you go to bed.  Some people find it useful to assign a \"worry period\" during the evening or afternoon for these issues. CPAP Equipment Cleaning and Disinfecting Schedule  Equipment Cleaning Frequency Instructions  Disinfecting Frequency   Non-Disposable Filters  Weekly Mild soapy water, Rinse, Air Dry Not Required   Disposable Filters Change as needed  2-4 weeks Do Not Wash Not Required   Hose/tubing Daily Mild soapy water, Rinse, Air Dry Once a week   Mask / Nasal Pillows Daily Mild soapy water, Rinse, Air Dry Once a week   Headgear Weekly Hand wash, Mild soapy water, Rinse, Dry  Not Required   Humidifier Daily Empty water daily  Mild soapy water, Rinse well, Air Dry  Once a week   CPAP Unit As Needed Dust with damp cloth,  No detergents or sprays Not Required         Disinfect (per schedule) with 1 part white vinegar and 3 parts water- soak mask and water chamber for 30 minutes every 1-2 weeks, more often if sick. Allow water/vinegar mixture to run through tubing. Allow all equipment to air dry. Drying Hints:   Always hang tubing away from direct sunlight, as this will cause the tubing to become yellow, brittle and crack over a period of time. DO NOT attach the wet tubing to your CPAP unit to blow-dry it. The moisture from the tubing can drain back into your machine. Moisture in your unit can cause sudden pressure increases or short circuits  DO's and DON'Ts:  - Don't use alcohol-based products to clean your mask, because it can cause the materials to become hard and brittle. - Don't put headgear in the washer or dryer  - Don't use any caustic or household cleaning solutions such as bleach on your CPAP   equipment.  - Do follow the recommended cleaning schedule. - Do change your disposable filter frequently. Adapted From: [email or web address]. shtm.   These are general suggestions for all models please follow specific s recommendations and specific instructions

## 2017-12-08 RX ORDER — MONTELUKAST SODIUM 10 MG/1
TABLET ORAL
Qty: 90 TABLET | Refills: 0 | Status: SHIPPED | OUTPATIENT
Start: 2017-12-08 | End: 2018-01-22 | Stop reason: SDUPTHER

## 2017-12-08 RX ORDER — AMLODIPINE BESYLATE 10 MG/1
TABLET ORAL
Qty: 90 TABLET | Refills: 0 | Status: SHIPPED | OUTPATIENT
Start: 2017-12-08 | End: 2018-01-22 | Stop reason: SDUPTHER

## 2017-12-08 RX ORDER — HYDROCHLOROTHIAZIDE 25 MG/1
TABLET ORAL
Qty: 90 TABLET | Refills: 0 | Status: SHIPPED | OUTPATIENT
Start: 2017-12-08 | End: 2018-01-22 | Stop reason: SDUPTHER

## 2017-12-08 RX ORDER — GABAPENTIN 100 MG/1
CAPSULE ORAL
Qty: 90 CAPSULE | Refills: 0 | Status: SHIPPED | OUTPATIENT
Start: 2017-12-08 | End: 2018-03-12 | Stop reason: SDUPTHER

## 2017-12-08 RX ORDER — ATORVASTATIN CALCIUM 40 MG/1
TABLET, FILM COATED ORAL
Qty: 135 TABLET | Refills: 0 | Status: SHIPPED | OUTPATIENT
Start: 2017-12-08 | End: 2018-01-22 | Stop reason: SDUPTHER

## 2017-12-08 RX ORDER — CELECOXIB 200 MG/1
CAPSULE ORAL
Qty: 90 CAPSULE | Refills: 1 | Status: SHIPPED | OUTPATIENT
Start: 2017-12-08 | End: 2018-05-11 | Stop reason: SDUPTHER

## 2017-12-08 RX ORDER — LOSARTAN POTASSIUM 50 MG/1
TABLET ORAL
Qty: 90 TABLET | Refills: 1 | Status: SHIPPED | OUTPATIENT
Start: 2017-12-08 | End: 2018-05-11 | Stop reason: SDUPTHER

## 2017-12-28 RX ORDER — DULOXETIN HYDROCHLORIDE 60 MG/1
CAPSULE, DELAYED RELEASE ORAL
Qty: 90 CAPSULE | Refills: 0 | Status: SHIPPED | OUTPATIENT
Start: 2017-12-28 | End: 2018-01-22 | Stop reason: SDUPTHER

## 2017-12-28 NOTE — TELEPHONE ENCOUNTER
Last Seen: 7/18/2017  Next Appointment: 1/22/2018    Requested Prescriptions     Pending Prescriptions Disp Refills    DULoxetine (CYMBALTA) 60 MG extended release capsule [Pharmacy Med Name: DULOXETINE DR 60MG CAP] 90 capsule 0     Sig: TAKE ONE CAPSULE BY MOUTH TWICE A DAY

## 2018-01-17 RX ORDER — TOPIRAMATE 100 MG/1
TABLET, FILM COATED ORAL
Qty: 90 TABLET | Refills: 0 | Status: SHIPPED | OUTPATIENT
Start: 2018-01-17 | End: 2018-05-11 | Stop reason: SDUPTHER

## 2018-01-22 ENCOUNTER — CARE COORDINATION (OUTPATIENT)
Dept: CARE COORDINATION | Age: 78
End: 2018-01-22

## 2018-01-22 ENCOUNTER — OFFICE VISIT (OUTPATIENT)
Dept: FAMILY MEDICINE CLINIC | Age: 78
End: 2018-01-22

## 2018-01-22 VITALS
OXYGEN SATURATION: 95 % | BODY MASS INDEX: 45 KG/M2 | WEIGHT: 278.8 LBS | HEART RATE: 84 BPM | DIASTOLIC BLOOD PRESSURE: 80 MMHG | SYSTOLIC BLOOD PRESSURE: 130 MMHG

## 2018-01-22 DIAGNOSIS — G25.0 ESSENTIAL TREMOR: ICD-10-CM

## 2018-01-22 DIAGNOSIS — E11.22 TYPE 2 DIABETES MELLITUS WITH STAGE 3 CHRONIC KIDNEY DISEASE, UNSPECIFIED LONG TERM INSULIN USE STATUS: Primary | ICD-10-CM

## 2018-01-22 DIAGNOSIS — I10 ESSENTIAL HYPERTENSION: ICD-10-CM

## 2018-01-22 DIAGNOSIS — N18.3 TYPE 2 DIABETES MELLITUS WITH STAGE 3 CHRONIC KIDNEY DISEASE, UNSPECIFIED LONG TERM INSULIN USE STATUS: Primary | ICD-10-CM

## 2018-01-22 DIAGNOSIS — E66.01 MORBID OBESITY WITH BMI OF 40.0-44.9, ADULT (HCC): ICD-10-CM

## 2018-01-22 DIAGNOSIS — Z91.81 AT HIGH RISK FOR FALLS: ICD-10-CM

## 2018-01-22 DIAGNOSIS — N18.30 CKD (CHRONIC KIDNEY DISEASE) STAGE 3, GFR 30-59 ML/MIN (HCC): ICD-10-CM

## 2018-01-22 DIAGNOSIS — J45.40 MODERATE PERSISTENT ASTHMA WITHOUT COMPLICATION: ICD-10-CM

## 2018-01-22 LAB — HBA1C MFR BLD: 6.9 %

## 2018-01-22 PROCEDURE — 3017F COLORECTAL CA SCREEN DOC REV: CPT | Performed by: INTERNAL MEDICINE

## 2018-01-22 PROCEDURE — 99214 OFFICE O/P EST MOD 30 MIN: CPT | Performed by: INTERNAL MEDICINE

## 2018-01-22 PROCEDURE — 1090F PRES/ABSN URINE INCON ASSESS: CPT | Performed by: INTERNAL MEDICINE

## 2018-01-22 PROCEDURE — G8427 DOCREV CUR MEDS BY ELIG CLIN: HCPCS | Performed by: INTERNAL MEDICINE

## 2018-01-22 PROCEDURE — G8599 NO ASA/ANTIPLAT THER USE RNG: HCPCS | Performed by: INTERNAL MEDICINE

## 2018-01-22 PROCEDURE — G8484 FLU IMMUNIZE NO ADMIN: HCPCS | Performed by: INTERNAL MEDICINE

## 2018-01-22 PROCEDURE — 3044F HG A1C LEVEL LT 7.0%: CPT | Performed by: INTERNAL MEDICINE

## 2018-01-22 PROCEDURE — G8417 CALC BMI ABV UP PARAM F/U: HCPCS | Performed by: INTERNAL MEDICINE

## 2018-01-22 PROCEDURE — G8400 PT W/DXA NO RESULTS DOC: HCPCS | Performed by: INTERNAL MEDICINE

## 2018-01-22 PROCEDURE — 1123F ACP DISCUSS/DSCN MKR DOCD: CPT | Performed by: INTERNAL MEDICINE

## 2018-01-22 PROCEDURE — 1036F TOBACCO NON-USER: CPT | Performed by: INTERNAL MEDICINE

## 2018-01-22 PROCEDURE — 83036 HEMOGLOBIN GLYCOSYLATED A1C: CPT | Performed by: INTERNAL MEDICINE

## 2018-01-22 PROCEDURE — 4040F PNEUMOC VAC/ADMIN/RCVD: CPT | Performed by: INTERNAL MEDICINE

## 2018-01-22 RX ORDER — ALBUTEROL SULFATE 90 UG/1
AEROSOL, METERED RESPIRATORY (INHALATION)
Qty: 1 INHALER | Refills: 5 | Status: CANCELLED | OUTPATIENT
Start: 2018-01-22

## 2018-01-22 RX ORDER — FLUTICASONE PROPIONATE 50 MCG
SPRAY, SUSPENSION (ML) NASAL
Qty: 16 G | Refills: 5 | Status: CANCELLED | OUTPATIENT
Start: 2018-01-22

## 2018-01-22 RX ORDER — DULOXETIN HYDROCHLORIDE 30 MG/1
CAPSULE, DELAYED RELEASE ORAL
Qty: 60 CAPSULE | Refills: 5 | Status: CANCELLED | OUTPATIENT
Start: 2018-01-22

## 2018-01-22 RX ORDER — LAMOTRIGINE 25 MG/1
50 TABLET ORAL 2 TIMES DAILY
COMMUNITY
End: 2020-06-11 | Stop reason: CLARIF

## 2018-01-22 NOTE — CARE COORDINATION
Reviewed schedule and noted patient had recent fall with ED visit  DM with A1c 6.9 at goal  Pt did not have time to meet with [facility] at today's PCP appt due to family death (older bro  today) however KALINA Chamberlainrogelioleanna Cueva gave patient my card and reports that patient is agreeable for [facility].        Plan:  Schedule for AWV with Falls Screening including TUG test for falls  Call to schedule [facility] Intake next week due to family death/    Future Appointments  Date Time Provider Katy Booker   2018 10:00 AM MD BIGG Lynch   2018 10:00 AM Luz Cabrera CNP [facility] [place]

## 2018-01-22 NOTE — PROGRESS NOTES
SUBJECTIVE:  CC: Diabetes    HPI:  Padmini Aaron presents for follow up and evaluation of diabetes. 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 Improving since last visit. Trying to get back to the gym for simple exercises. Home blood glucose log brought to visit: No.  Control is Good per home record. she has No symptoms of hypoglycemia. she has No symptoms of hyperglycemia. The patient denied polyphagia, polydipsia, or polyuria.     The last HBA1C and routine lab was   Lab Results   Component Value Date    LABA1C 7.3 06/27/2017     Lab Results   Component Value Date    .3 02/10/2017     Lab Results   Component Value Date    WBC 10.7 04/26/2017    HGB 11.2 (L) 04/26/2017    HCT 35.5 (L) 04/26/2017     04/26/2017    CHOL 186 07/03/2017    TRIG 121 07/03/2017    HDL 75 (H) 07/03/2017    ALT <5 (L) 04/23/2017    AST 22 04/23/2017     04/26/2017    K 4.2 04/26/2017     04/26/2017    CREATININE 1.0 04/26/2017    BUN 30 (H) 04/26/2017    CO2 31 04/26/2017    TSH 1.93 12/20/2011    INR 1.02 02/10/2017    LABA1C 7.3 06/27/2017    LABMICR Not Indicated

## 2018-01-24 RX ORDER — AMLODIPINE BESYLATE 10 MG/1
TABLET ORAL
Qty: 90 TABLET | Refills: 1 | Status: SHIPPED | OUTPATIENT
Start: 2018-01-24 | End: 2018-04-06 | Stop reason: SDUPTHER

## 2018-01-24 RX ORDER — ALBUTEROL SULFATE 90 UG/1
2 AEROSOL, METERED RESPIRATORY (INHALATION) EVERY 6 HOURS PRN
Qty: 3 INHALER | Refills: 1 | Status: SHIPPED | OUTPATIENT
Start: 2018-01-24 | End: 2018-02-08 | Stop reason: SDUPTHER

## 2018-01-24 RX ORDER — DULOXETIN HYDROCHLORIDE 60 MG/1
CAPSULE, DELAYED RELEASE ORAL
Qty: 90 CAPSULE | Refills: 1 | Status: SHIPPED | OUTPATIENT
Start: 2018-01-24 | End: 2018-04-19 | Stop reason: SDUPTHER

## 2018-01-24 RX ORDER — ATORVASTATIN CALCIUM 40 MG/1
TABLET, FILM COATED ORAL
Qty: 135 TABLET | Refills: 1 | Status: SHIPPED | OUTPATIENT
Start: 2018-01-24 | End: 2018-05-11 | Stop reason: SDUPTHER

## 2018-01-24 RX ORDER — MONTELUKAST SODIUM 10 MG/1
TABLET ORAL
Qty: 90 TABLET | Refills: 1 | Status: SHIPPED | OUTPATIENT
Start: 2018-01-24 | End: 2018-05-11 | Stop reason: SDUPTHER

## 2018-01-24 RX ORDER — HYDROCHLOROTHIAZIDE 25 MG/1
TABLET ORAL
Qty: 90 TABLET | Refills: 1 | Status: SHIPPED | OUTPATIENT
Start: 2018-01-24 | End: 2018-04-06

## 2018-02-04 RX ORDER — FLUTICASONE PROPIONATE 50 MCG
SPRAY, SUSPENSION (ML) NASAL
Qty: 16 G | Refills: 5 | Status: SHIPPED | OUTPATIENT
Start: 2018-02-04 | End: 2018-07-24 | Stop reason: SDUPTHER

## 2018-02-08 RX ORDER — ALBUTEROL SULFATE 90 UG/1
2 AEROSOL, METERED RESPIRATORY (INHALATION) EVERY 6 HOURS PRN
Qty: 3 INHALER | Refills: 1 | Status: SHIPPED | OUTPATIENT
Start: 2018-02-08 | End: 2018-07-30 | Stop reason: SDUPTHER

## 2018-02-22 ENCOUNTER — OFFICE VISIT (OUTPATIENT)
Dept: FAMILY MEDICINE CLINIC | Age: 78
End: 2018-02-22

## 2018-02-22 VITALS
SYSTOLIC BLOOD PRESSURE: 124 MMHG | OXYGEN SATURATION: 96 % | HEART RATE: 90 BPM | DIASTOLIC BLOOD PRESSURE: 66 MMHG | HEIGHT: 66 IN | TEMPERATURE: 97.8 F | WEIGHT: 268 LBS | BODY MASS INDEX: 43.07 KG/M2

## 2018-02-22 DIAGNOSIS — N18.30 CKD (CHRONIC KIDNEY DISEASE) STAGE 3, GFR 30-59 ML/MIN (HCC): ICD-10-CM

## 2018-02-22 DIAGNOSIS — G25.0 ESSENTIAL TREMOR: ICD-10-CM

## 2018-02-22 DIAGNOSIS — N18.3 TYPE 2 DIABETES MELLITUS WITH STAGE 3 CHRONIC KIDNEY DISEASE, UNSPECIFIED LONG TERM INSULIN USE STATUS: ICD-10-CM

## 2018-02-22 DIAGNOSIS — J20.9 ACUTE BRONCHITIS, UNSPECIFIED ORGANISM: Primary | ICD-10-CM

## 2018-02-22 DIAGNOSIS — H65.91 OME (OTITIS MEDIA WITH EFFUSION), RIGHT: ICD-10-CM

## 2018-02-22 DIAGNOSIS — E11.22 TYPE 2 DIABETES MELLITUS WITH STAGE 3 CHRONIC KIDNEY DISEASE, UNSPECIFIED LONG TERM INSULIN USE STATUS: ICD-10-CM

## 2018-02-22 DIAGNOSIS — J45.40 MODERATE PERSISTENT ASTHMA WITHOUT COMPLICATION: ICD-10-CM

## 2018-02-22 PROCEDURE — 1036F TOBACCO NON-USER: CPT | Performed by: PHYSICIAN ASSISTANT

## 2018-02-22 PROCEDURE — 99214 OFFICE O/P EST MOD 30 MIN: CPT | Performed by: PHYSICIAN ASSISTANT

## 2018-02-22 PROCEDURE — G8427 DOCREV CUR MEDS BY ELIG CLIN: HCPCS | Performed by: PHYSICIAN ASSISTANT

## 2018-02-22 PROCEDURE — 4040F PNEUMOC VAC/ADMIN/RCVD: CPT | Performed by: PHYSICIAN ASSISTANT

## 2018-02-22 PROCEDURE — 3044F HG A1C LEVEL LT 7.0%: CPT | Performed by: PHYSICIAN ASSISTANT

## 2018-02-22 PROCEDURE — G8484 FLU IMMUNIZE NO ADMIN: HCPCS | Performed by: PHYSICIAN ASSISTANT

## 2018-02-22 PROCEDURE — G8400 PT W/DXA NO RESULTS DOC: HCPCS | Performed by: PHYSICIAN ASSISTANT

## 2018-02-22 PROCEDURE — 3017F COLORECTAL CA SCREEN DOC REV: CPT | Performed by: PHYSICIAN ASSISTANT

## 2018-02-22 PROCEDURE — G8599 NO ASA/ANTIPLAT THER USE RNG: HCPCS | Performed by: PHYSICIAN ASSISTANT

## 2018-02-22 PROCEDURE — 1090F PRES/ABSN URINE INCON ASSESS: CPT | Performed by: PHYSICIAN ASSISTANT

## 2018-02-22 PROCEDURE — 1123F ACP DISCUSS/DSCN MKR DOCD: CPT | Performed by: PHYSICIAN ASSISTANT

## 2018-02-22 PROCEDURE — G8417 CALC BMI ABV UP PARAM F/U: HCPCS | Performed by: PHYSICIAN ASSISTANT

## 2018-02-22 PROCEDURE — 94640 AIRWAY INHALATION TREATMENT: CPT | Performed by: PHYSICIAN ASSISTANT

## 2018-02-22 RX ORDER — PREDNISONE 20 MG/1
40 TABLET ORAL DAILY
Qty: 8 TABLET | Refills: 0 | Status: SHIPPED | OUTPATIENT
Start: 2018-02-22 | End: 2018-02-26

## 2018-02-22 RX ORDER — AMOXICILLIN 500 MG/1
500 CAPSULE ORAL 3 TIMES DAILY
Qty: 30 CAPSULE | Refills: 0 | Status: SHIPPED | OUTPATIENT
Start: 2018-02-22 | End: 2018-03-01

## 2018-02-22 RX ORDER — LEVALBUTEROL INHALATION SOLUTION 1.25 MG/3ML
1.25 SOLUTION RESPIRATORY (INHALATION) ONCE
Status: COMPLETED | OUTPATIENT
Start: 2018-02-22 | End: 2018-02-22

## 2018-02-22 RX ADMIN — LEVALBUTEROL INHALATION SOLUTION 1.25 MG: 1.25 SOLUTION RESPIRATORY (INHALATION) at 10:42

## 2018-02-22 RX ADMIN — Medication 0.5 MG: at 10:41

## 2018-02-22 NOTE — PROGRESS NOTES
Name and dose of Inhalation: Levalbuterol (Xopenex) 1.25mg/3mL  Lot #: [id number]   name: Lanrepaul Almanzar date: 10/2018  Site / Route: Inhalation  ND: 6753-1602-13  Date given: February 22, 2018  VIS given / date: NA / NA    Given in Combination with: Ipratropium Bromide 0.5mg    Administered by: Torri Best, [address]      Name and dose of Inhalation: Ipratropium Bromide 0.5mg   Lot #: [id number]   name: Art  Expiration date: 04/2019  Site / Route: {Injection site: Inhale  NDC: 5627-0789-50  Date given: February 22, 2018  VIS given / date: NA / NA    Given in Combination with: Levalbuterol (Xopenex) 1.25mg/3mL       Administered by: Torri Best, [address]

## 2018-02-22 NOTE — PATIENT INSTRUCTIONS
Increase water intake. Use inhalers regularly for next few days. Start antibiotics and steroids. Follow up with Dr Daysi Rivas in 1 week or if worse go to ER.

## 2018-02-22 NOTE — PROGRESS NOTES
[address]        CC:    Chief Complaint   Patient presents with    Cough     Patient of Dr Neida Gabriel [facility]) c/o cough and congestion, SOB, and fatigue x 3-4 days     Asthma       HPI:  Tom  is a 78 y.o. female complains of as per chief complaint. This  began 4 days sudden onset and getting worse. Symptoms :  sore throat: No.   Head congestion: Yes. Headache: No.   Sinus pressure: No.  Rhinorrhea: No. Ear fullness/ otalgia: Yes, right ear most..  Fever or chills: No.   Myalgias: No. Malaise: No.     Cough is productive and hurts to cough. sptm is yellow. Loss of appetite:Yes. Nausea: No. Denies rash. History of asthma or allergies: Yes . Smoke: No.   Sick contacts: Yes, husb and caretaker. Flu vax received: Yes. Remedies tried include: mucinex, cough drops    ROS:  Reviewed and otherwise negative for other constitutional, neurologic, ENT, cardiac, pulmonary, GI,  or other musculoskeletal or extremity complaints. HISTORY: Reviewed    MEDICATIONS:  Current Outpatient Prescriptions on File Prior to Visit   Medication Sig Dispense Refill    albuterol sulfate HFA (VENTOLIN HFA) 108 (90 Base) MCG/ACT inhaler Inhale 2 puffs into the lungs every 6 hours as needed for Wheezing 3 Inhaler 1    fluticasone (FLONASE) 50 MCG/ACT nasal spray USE 2 PUFFS IN EACH NOSTRIL DAILY 16 g 5    salmeterol (SEREVENT) 50 MCG/DOSE diskus inhaler Inhale 1 puff by mouth 2 times a day. 180 each 1    amLODIPine (NORVASC) 10 MG tablet TAKE 1 TABLET BY MOUTH EVERY DAY 90 tablet 1    montelukast (SINGULAIR) 10 MG tablet Take 1 tablet by mouth daily. 90 tablet 1    atorvastatin (LIPITOR) 40 MG tablet Take 1 and 1/2 tablets by mouth every evening 135 tablet 1    hydrochlorothiazide (HYDRODIURIL) 25 MG tablet Take 1 tablet by mouth daily.  90 tablet 1    carbidopa-levodopa (SINEMET)  MG per tablet TAKE 1 TABLET BY MOUTH 3 TIMES A DAY 90 tablet 1    DULoxetine (CYMBALTA) counseling and coordinating the care of this patient.) Instructions discussed in detail. Patient Instructions   Increase water intake. Use inhalers regularly for next few days. Start antibiotics and steroids. Follow up with Dr Jet Owusu in 1 week or if worse go to ER. Orders Placed This Encounter   Medications    amoxicillin (AMOXIL) 500 MG capsule     Sig: Take 1 capsule by mouth 3 times daily for 10 days     Dispense:  30 capsule     Refill:  0    predniSONE (DELTASONE) 20 MG tablet     Sig: Take 2 tablets by mouth daily for 4 days TWO 20 mg tablets [er day for 4 days.      Dispense:  8 tablet     Refill:  0

## 2018-03-01 ENCOUNTER — OFFICE VISIT (OUTPATIENT)
Dept: FAMILY MEDICINE CLINIC | Age: 78
End: 2018-03-01

## 2018-03-01 VITALS
DIASTOLIC BLOOD PRESSURE: 60 MMHG | TEMPERATURE: 98.1 F | BODY MASS INDEX: 42.64 KG/M2 | SYSTOLIC BLOOD PRESSURE: 130 MMHG | HEART RATE: 93 BPM | OXYGEN SATURATION: 96 % | WEIGHT: 264.2 LBS

## 2018-03-01 DIAGNOSIS — H65.02 ACUTE SEROUS OTITIS MEDIA OF LEFT EAR, RECURRENCE NOT SPECIFIED: ICD-10-CM

## 2018-03-01 DIAGNOSIS — R53.81 MALAISE AND FATIGUE: ICD-10-CM

## 2018-03-01 DIAGNOSIS — R05.9 COUGH: ICD-10-CM

## 2018-03-01 DIAGNOSIS — R06.02 SOB (SHORTNESS OF BREATH): ICD-10-CM

## 2018-03-01 DIAGNOSIS — R53.83 MALAISE AND FATIGUE: ICD-10-CM

## 2018-03-01 DIAGNOSIS — J20.9 ACUTE BRONCHITIS WITH ASTHMA: ICD-10-CM

## 2018-03-01 DIAGNOSIS — J45.909 ACUTE BRONCHITIS WITH ASTHMA: ICD-10-CM

## 2018-03-01 LAB
INFLUENZA A ANTIBODY: NORMAL
INFLUENZA B ANTIBODY: NORMAL

## 2018-03-01 PROCEDURE — G8417 CALC BMI ABV UP PARAM F/U: HCPCS | Performed by: NURSE PRACTITIONER

## 2018-03-01 PROCEDURE — 87804 INFLUENZA ASSAY W/OPTIC: CPT | Performed by: NURSE PRACTITIONER

## 2018-03-01 PROCEDURE — G8599 NO ASA/ANTIPLAT THER USE RNG: HCPCS | Performed by: NURSE PRACTITIONER

## 2018-03-01 PROCEDURE — 94640 AIRWAY INHALATION TREATMENT: CPT | Performed by: NURSE PRACTITIONER

## 2018-03-01 PROCEDURE — 1090F PRES/ABSN URINE INCON ASSESS: CPT | Performed by: NURSE PRACTITIONER

## 2018-03-01 PROCEDURE — 1123F ACP DISCUSS/DSCN MKR DOCD: CPT | Performed by: NURSE PRACTITIONER

## 2018-03-01 PROCEDURE — 4040F PNEUMOC VAC/ADMIN/RCVD: CPT | Performed by: NURSE PRACTITIONER

## 2018-03-01 PROCEDURE — G8427 DOCREV CUR MEDS BY ELIG CLIN: HCPCS | Performed by: NURSE PRACTITIONER

## 2018-03-01 PROCEDURE — G8484 FLU IMMUNIZE NO ADMIN: HCPCS | Performed by: NURSE PRACTITIONER

## 2018-03-01 PROCEDURE — 3017F COLORECTAL CA SCREEN DOC REV: CPT | Performed by: NURSE PRACTITIONER

## 2018-03-01 PROCEDURE — 99213 OFFICE O/P EST LOW 20 MIN: CPT | Performed by: NURSE PRACTITIONER

## 2018-03-01 PROCEDURE — G8400 PT W/DXA NO RESULTS DOC: HCPCS | Performed by: NURSE PRACTITIONER

## 2018-03-01 PROCEDURE — 1036F TOBACCO NON-USER: CPT | Performed by: NURSE PRACTITIONER

## 2018-03-01 RX ORDER — METHYLPREDNISOLONE 4 MG/1
TABLET ORAL
Qty: 21 TABLET | Refills: 0 | Status: SHIPPED | OUTPATIENT
Start: 2018-03-01 | End: 2018-03-07

## 2018-03-01 RX ORDER — IPRATROPIUM BROMIDE AND ALBUTEROL SULFATE 2.5; .5 MG/3ML; MG/3ML
1 SOLUTION RESPIRATORY (INHALATION) ONCE
Status: COMPLETED | OUTPATIENT
Start: 2018-03-01 | End: 2018-03-01

## 2018-03-01 RX ORDER — LEVOFLOXACIN 500 MG/1
500 TABLET, FILM COATED ORAL DAILY
Qty: 7 TABLET | Refills: 0 | Status: SHIPPED | OUTPATIENT
Start: 2018-03-01 | End: 2018-03-08

## 2018-03-01 RX ADMIN — IPRATROPIUM BROMIDE AND ALBUTEROL SULFATE 1 AMPULE: 2.5; .5 SOLUTION RESPIRATORY (INHALATION) at 09:13

## 2018-03-01 NOTE — PROGRESS NOTES
Neurological: Negative for dizziness and headaches. Physical Exam   Constitutional: She is oriented to person, place, and time. She appears well-developed and well-nourished. No distress. HENT:   Right Ear: External ear normal.   Left Ear: External ear and ear canal normal. Tympanic membrane is erythematous. Mouth/Throat: Oropharynx is clear and moist. No oropharyngeal exudate. Eyes: Conjunctivae are normal.   Neck: Neck supple. Cardiovascular: Normal rate, regular rhythm and normal heart sounds. Mild BLE edema   Pulmonary/Chest: She has decreased breath sounds. She has wheezes in the right middle field, the right lower field, the left middle field and the left lower field. She has no rhonchi. She has no rales. Lymphadenopathy:     She has no cervical adenopathy. Neurological: She is alert and oriented to person, place, and time. Nursing note and vitals reviewed. Vitals:    03/01/18 0840 03/01/18 0849 03/01/18 0912   BP: 130/60     Site: Left Arm     Position: Sitting     Cuff Size: Medium Adult     Pulse: 93     Temp: 98.1 °F (36.7 °C)     TempSrc: Oral     SpO2: 91% 94% 96%   Weight: 264 lb 3.2 oz (119.8 kg)         Assessment    1. Acute bronchitis with asthma    2. Acute serous otitis media of left ear, recurrence not specified    3. SOB (shortness of breath)    4. Cough    5. Malaise and fatigue      Gatito Blanco was seen today for cough and shortness of breath. Diagnoses and all orders for this visit:    Acute bronchitis with asthma    Left otitis media    -     ipratropium-albuterol (DUONEB) nebulizer solution 1 ampule; Inhale 3 mLs into the lungs once- air movement improved, wheezing improved after breathing treatment   -     levofloxacin (LEVAQUIN) 500 MG tablet; Take 1 tablet by mouth daily for 7 days  -     methylPREDNISolone (MEDROL DOSEPACK) 4 MG tablet; Take by mouth.     SOB (shortness of breath), cough   -     77502 - [place] INHAL RX, AIRWAY OBST/DX SPUTUM INDUCT  -

## 2018-03-04 ASSESSMENT — ENCOUNTER SYMPTOMS
GASTROINTESTINAL NEGATIVE: 1
SINUS PAIN: 0
SHORTNESS OF BREATH: 1
HEMOPTYSIS: 0
WHEEZING: 1
SORE THROAT: 0
SPUTUM PRODUCTION: 0
COUGH: 1

## 2018-03-05 ENCOUNTER — OFFICE VISIT (OUTPATIENT)
Dept: FAMILY MEDICINE CLINIC | Age: 78
End: 2018-03-05

## 2018-03-05 VITALS
BODY MASS INDEX: 42.43 KG/M2 | TEMPERATURE: 98.1 F | WEIGHT: 264 LBS | SYSTOLIC BLOOD PRESSURE: 96 MMHG | HEART RATE: 86 BPM | OXYGEN SATURATION: 97 % | HEIGHT: 66 IN | DIASTOLIC BLOOD PRESSURE: 60 MMHG

## 2018-03-05 DIAGNOSIS — J45.901 ASTHMA WITH ACUTE EXACERBATION, UNSPECIFIED ASTHMA SEVERITY, UNSPECIFIED WHETHER PERSISTENT: ICD-10-CM

## 2018-03-05 DIAGNOSIS — E78.5 HYPERLIPIDEMIA, UNSPECIFIED HYPERLIPIDEMIA TYPE: ICD-10-CM

## 2018-03-05 DIAGNOSIS — J45.901 ASTHMA WITH ACUTE EXACERBATION, UNSPECIFIED ASTHMA SEVERITY, UNSPECIFIED WHETHER PERSISTENT: Primary | ICD-10-CM

## 2018-03-05 DIAGNOSIS — N18.30 CKD (CHRONIC KIDNEY DISEASE) STAGE 3, GFR 30-59 ML/MIN (HCC): ICD-10-CM

## 2018-03-05 DIAGNOSIS — I10 ESSENTIAL HYPERTENSION: ICD-10-CM

## 2018-03-05 PROCEDURE — 99213 OFFICE O/P EST LOW 20 MIN: CPT | Performed by: PHYSICIAN ASSISTANT

## 2018-03-05 RX ORDER — IPRATROPIUM BROMIDE AND ALBUTEROL SULFATE 2.5; .5 MG/3ML; MG/3ML
1 SOLUTION RESPIRATORY (INHALATION) EVERY 4 HOURS PRN
Qty: 25 VIAL | Refills: 3 | Status: SHIPPED | OUTPATIENT
Start: 2018-03-05 | End: 2018-03-06 | Stop reason: SDUPTHER

## 2018-03-05 RX ORDER — IPRATROPIUM BROMIDE AND ALBUTEROL SULFATE 2.5; .5 MG/3ML; MG/3ML
1 SOLUTION RESPIRATORY (INHALATION) EVERY 4 HOURS PRN
Qty: 25 VIAL | Refills: 3 | Status: CANCELLED | OUTPATIENT
Start: 2018-03-05 | End: 2018-03-12

## 2018-03-05 ASSESSMENT — PATIENT HEALTH QUESTIONNAIRE - PHQ9
1. LITTLE INTEREST OR PLEASURE IN DOING THINGS: 0
2. FEELING DOWN, DEPRESSED OR HOPELESS: 0
SUM OF ALL RESPONSES TO PHQ9 QUESTIONS 1 & 2: 0
SUM OF ALL RESPONSES TO PHQ QUESTIONS 1-9: 0

## 2018-03-05 ASSESSMENT — ENCOUNTER SYMPTOMS
COUGH: 1
NAUSEA: 0
VOMITING: 0
DIARRHEA: 0
ABDOMINAL PAIN: 0
SORE THROAT: 0
CONSTIPATION: 0
RHINORRHEA: 0
SHORTNESS OF BREATH: 1

## 2018-03-05 NOTE — PROGRESS NOTES
3/5/2018  Erica Chatterjee (: 1940)  78 y.o.      HPI  Here for follow up of HTN    HTN: does not monitor at home has been doing well on current regimen: amlodipine and atorvastatin. Denies headache, chest pain, soa. Parkinsons: Follows with neurology. Topimate is having a noticeable effect on memory, so she is decreasing with plan to stop. Is  Increasing lamotrigine. Feels that symptoms have been stable. Makes exercising difficult, but has home equipment. Depression: has been taking 60mg Cymbalta in the morning, was previously on 30 mg BID. Unclear if she should be taking 60 mg BID.  wants to clarify with home instructions. Patient feels mood is stable, no notice of depressed mood. Has had cough with some sinus congestion and ear pain. Was started on levaquin and prednisone and has had improvement in symptoms. Reports she is feeling much better and home O2 has been stable in low to mid 90s, occassionally in the 80s. Sometimes feels wheezy. Has been on \"strict diet,\" eating increased vegetables and eating pre-portioned/calorie restricted frozen dinners. Has not had much of an appetite. Has been working out at the [employer] a few times a week. Able to do 5-10 minutes at a time. Patient states that she sturggles with motivation and relies on her  to Joesph C her. \"     Review of Systems   Constitutional: Positive for activity change. Negative for chills and fever. HENT: Negative for congestion, ear pain, rhinorrhea and sore throat. Eyes: Negative for visual disturbance. Respiratory: Positive for cough (improving) and shortness of breath (improving). Cardiovascular: Positive for leg swelling (improved). Negative for chest pain and palpitations. Gastrointestinal: Negative for abdominal pain, constipation, diarrhea, nausea and vomiting. Genitourinary: Negative for difficulty urinating and dysuria. Musculoskeletal: Negative for arthralgias and myalgias.    Skin: Negative for rash. Neurological: Positive for tremors (baseline). Negative for dizziness, weakness and numbness. Psychiatric/Behavioral: Negative for sleep disturbance. Allergies, past medical history, family history, and social history reviewed and unchanged from previous encounter. Current Outpatient Prescriptions   Medication Sig Dispense Refill    Respiratory Therapy Supplies (NEBULIZER COMPRESSOR) KIT Use Q6 hours as needed with Duoneb. 1 kit 0    ipratropium-albuterol (DUONEB) 0.5-2.5 (3) MG/3ML SOLN nebulizer solution Inhale 3 mLs into the lungs every 4 hours as needed for Shortness of Breath 25 vial 3    levofloxacin (LEVAQUIN) 500 MG tablet Take 1 tablet by mouth daily for 7 days 7 tablet 0    methylPREDNISolone (MEDROL DOSEPACK) 4 MG tablet Take by mouth. 21 tablet 0    albuterol sulfate HFA (VENTOLIN HFA) 108 (90 Base) MCG/ACT inhaler Inhale 2 puffs into the lungs every 6 hours as needed for Wheezing 3 Inhaler 1    fluticasone (FLONASE) 50 MCG/ACT nasal spray USE 2 PUFFS IN EACH NOSTRIL DAILY 16 g 5    salmeterol (SEREVENT) 50 MCG/DOSE diskus inhaler Inhale 1 puff by mouth 2 times a day. 180 each 1    amLODIPine (NORVASC) 10 MG tablet TAKE 1 TABLET BY MOUTH EVERY DAY 90 tablet 1    montelukast (SINGULAIR) 10 MG tablet Take 1 tablet by mouth daily. 90 tablet 1    atorvastatin (LIPITOR) 40 MG tablet Take 1 and 1/2 tablets by mouth every evening 135 tablet 1    hydrochlorothiazide (HYDRODIURIL) 25 MG tablet Take 1 tablet by mouth daily.  90 tablet 1    carbidopa-levodopa (SINEMET)  MG per tablet TAKE 1 TABLET BY MOUTH 3 TIMES A DAY 90 tablet 1    DULoxetine (CYMBALTA) 60 MG extended release capsule TAKE ONE CAPSULE BY MOUTH TWICE A DAY 90 capsule 1    lamoTRIgine (LAMICTAL) 25 MG tablet Take 25 mg by mouth 2 times daily       topiramate (TOPAMAX) 100 MG tablet TAKE 1+1/2 TABLET BY MOUTH TWICE DAILY (Patient taking differently: takes 1/2 a tablet bid) 90 tablet 0    ASMANEX  murmur heard. Pulmonary/Chest: Effort normal. No respiratory distress. She has wheezes. She has rales. Course breath sounds with expiratory wheezing bilaterally in all lung fields   Abdominal: Soft. Bowel sounds are normal. There is no tenderness. Musculoskeletal: She exhibits edema (2+ edema in BLE). Normal ROM of Bilateral upper and lower extremities and spine    Lymphadenopathy:     She has no cervical adenopathy. Neurological: She is alert and oriented to person, place, and time. She has normal reflexes. Tremor in bilateral hands at rest.    Skin: Skin is warm and dry. No rash noted. Psychiatric: She has a normal mood and affect. Relies on  for history       ASSESSMENT and PLAN:  Iesha Anguiano was seen today for shortness of breath, cough and congestion. Diagnoses and all orders for this visit:    Asthma with acute exacerbation, unspecified asthma severity, unspecified whether persistent  -     Respiratory Therapy Supplies (NEBULIZER COMPRESSOR) KIT; Use Q6 hours as needed with Duoneb. -     ipratropium-albuterol (DUONEB) 0.5-2.5 (3) MG/3ML SOLN nebulizer solution; Inhale 3 mLs into the lungs every 4 hours as needed for Shortness of Breath  - Discussed monitoring O2 at home, 80s and low 90s not acceptable; will need further work up if respiratory status does not improve with treatment of acute respiratory infection. Essential hypertension  - Stable on current  - Continue amlodipine 10 mg once daily  - Continue HCTZ 25 mg once daily   - Continue losartan 50 mg once daily  - RFP at next appt     Hyperlipidemia, unspecified hyperlipidemia type  - Stable, continue current regimen  - Continue atorvastatin   - Lipid panel in 7/18     Depression  - Stable   - Continue Cymbalta 60 mg once in the morning       Return in about 4 weeks (around 4/2/2018) for Asthma.

## 2018-03-05 NOTE — TELEPHONE ENCOUNTER
Pharm needs DX code. I added it to the sig line.          Last office visit 3/5/2018     Next office visit scheduled 7/30/2018    Requested Prescriptions     Pending Prescriptions Disp Refills    ipratropium-albuterol (DUONEB) 0.5-2.5 (3) MG/3ML SOLN nebulizer solution 25 vial 3     Sig: Inhale 3 mLs into the lungs every 4 hours as needed for Shortness of Breath J45.40

## 2018-03-06 DIAGNOSIS — J45.901 ASTHMA WITH ACUTE EXACERBATION, UNSPECIFIED ASTHMA SEVERITY, UNSPECIFIED WHETHER PERSISTENT: ICD-10-CM

## 2018-03-06 RX ORDER — IPRATROPIUM BROMIDE AND ALBUTEROL SULFATE 2.5; .5 MG/3ML; MG/3ML
1 SOLUTION RESPIRATORY (INHALATION) EVERY 4 HOURS PRN
Qty: 25 VIAL | Refills: 3 | Status: SHIPPED | OUTPATIENT
Start: 2018-03-06 | End: 2018-04-06 | Stop reason: SDUPTHER

## 2018-03-14 RX ORDER — GABAPENTIN 100 MG/1
CAPSULE ORAL
Qty: 90 CAPSULE | Refills: 0 | Status: SHIPPED | OUTPATIENT
Start: 2018-03-14 | End: 2018-05-11 | Stop reason: SDUPTHER

## 2018-04-06 ENCOUNTER — OFFICE VISIT (OUTPATIENT)
Dept: FAMILY MEDICINE CLINIC | Age: 78
End: 2018-04-06

## 2018-04-06 VITALS
HEART RATE: 113 BPM | OXYGEN SATURATION: 95 % | BODY MASS INDEX: 40.61 KG/M2 | SYSTOLIC BLOOD PRESSURE: 120 MMHG | DIASTOLIC BLOOD PRESSURE: 30 MMHG | WEIGHT: 251.6 LBS

## 2018-04-06 DIAGNOSIS — J45.901 ASTHMA WITH ACUTE EXACERBATION, UNSPECIFIED ASTHMA SEVERITY, UNSPECIFIED WHETHER PERSISTENT: ICD-10-CM

## 2018-04-06 DIAGNOSIS — N18.3 TYPE 2 DIABETES MELLITUS WITH STAGE 3 CHRONIC KIDNEY DISEASE, UNSPECIFIED LONG TERM INSULIN USE STATUS: ICD-10-CM

## 2018-04-06 DIAGNOSIS — E11.22 TYPE 2 DIABETES MELLITUS WITH STAGE 3 CHRONIC KIDNEY DISEASE, UNSPECIFIED LONG TERM INSULIN USE STATUS: ICD-10-CM

## 2018-04-06 DIAGNOSIS — I10 ESSENTIAL HYPERTENSION: ICD-10-CM

## 2018-04-06 DIAGNOSIS — T67.1XXA HEAT SYNCOPE, INITIAL ENCOUNTER: Primary | ICD-10-CM

## 2018-04-06 LAB — GLUCOSE BLD-MCNC: 111 MG/DL

## 2018-04-06 PROCEDURE — 1036F TOBACCO NON-USER: CPT | Performed by: INTERNAL MEDICINE

## 2018-04-06 PROCEDURE — G8427 DOCREV CUR MEDS BY ELIG CLIN: HCPCS | Performed by: INTERNAL MEDICINE

## 2018-04-06 PROCEDURE — 1090F PRES/ABSN URINE INCON ASSESS: CPT | Performed by: INTERNAL MEDICINE

## 2018-04-06 PROCEDURE — 1123F ACP DISCUSS/DSCN MKR DOCD: CPT | Performed by: INTERNAL MEDICINE

## 2018-04-06 PROCEDURE — G8599 NO ASA/ANTIPLAT THER USE RNG: HCPCS | Performed by: INTERNAL MEDICINE

## 2018-04-06 PROCEDURE — G8400 PT W/DXA NO RESULTS DOC: HCPCS | Performed by: INTERNAL MEDICINE

## 2018-04-06 PROCEDURE — G8417 CALC BMI ABV UP PARAM F/U: HCPCS | Performed by: INTERNAL MEDICINE

## 2018-04-06 PROCEDURE — 3044F HG A1C LEVEL LT 7.0%: CPT | Performed by: INTERNAL MEDICINE

## 2018-04-06 PROCEDURE — 36415 COLL VENOUS BLD VENIPUNCTURE: CPT | Performed by: INTERNAL MEDICINE

## 2018-04-06 PROCEDURE — 93000 ELECTROCARDIOGRAM COMPLETE: CPT | Performed by: INTERNAL MEDICINE

## 2018-04-06 PROCEDURE — 4040F PNEUMOC VAC/ADMIN/RCVD: CPT | Performed by: INTERNAL MEDICINE

## 2018-04-06 PROCEDURE — 82962 GLUCOSE BLOOD TEST: CPT | Performed by: INTERNAL MEDICINE

## 2018-04-06 PROCEDURE — 99214 OFFICE O/P EST MOD 30 MIN: CPT | Performed by: INTERNAL MEDICINE

## 2018-04-06 PROCEDURE — 3017F COLORECTAL CA SCREEN DOC REV: CPT | Performed by: INTERNAL MEDICINE

## 2018-04-06 RX ORDER — IPRATROPIUM BROMIDE AND ALBUTEROL SULFATE 2.5; .5 MG/3ML; MG/3ML
1 SOLUTION RESPIRATORY (INHALATION) 4 TIMES DAILY
Qty: 120 VIAL | Refills: 3 | Status: SHIPPED | OUTPATIENT
Start: 2018-04-06 | End: 2018-09-14 | Stop reason: SDUPTHER

## 2018-04-06 RX ORDER — AZITHROMYCIN 250 MG/1
TABLET, FILM COATED ORAL
Qty: 1 PACKET | Refills: 0 | Status: SHIPPED | OUTPATIENT
Start: 2018-04-06 | End: 2018-04-16

## 2018-04-06 RX ORDER — AMLODIPINE BESYLATE 10 MG/1
5 TABLET ORAL DAILY
Qty: 90 TABLET | Refills: 0
Start: 2018-04-06 | End: 2018-04-11 | Stop reason: SDUPTHER

## 2018-04-07 LAB
ALBUMIN SERPL-MCNC: 3.8 G/DL (ref 3.4–5)
ANION GAP SERPL CALCULATED.3IONS-SCNC: 19 MMOL/L (ref 3–16)
BUN BLDV-MCNC: 29 MG/DL (ref 7–20)
CALCIUM SERPL-MCNC: 9.5 MG/DL (ref 8.3–10.6)
CHLORIDE BLD-SCNC: 96 MMOL/L (ref 99–110)
CO2: 24 MMOL/L (ref 21–32)
CREAT SERPL-MCNC: 1.4 MG/DL (ref 0.6–1.2)
GFR AFRICAN AMERICAN: 44
GFR NON-AFRICAN AMERICAN: 37
GLUCOSE BLD-MCNC: 100 MG/DL (ref 70–99)
HCT VFR BLD CALC: 33 % (ref 36–48)
HEMOGLOBIN: 10.5 G/DL (ref 12–16)
MCH RBC QN AUTO: 26.9 PG (ref 26–34)
MCHC RBC AUTO-ENTMCNC: 31.9 G/DL (ref 31–36)
MCV RBC AUTO: 84.5 FL (ref 80–100)
PDW BLD-RTO: 15.4 % (ref 12.4–15.4)
PHOSPHORUS: 3.8 MG/DL (ref 2.5–4.9)
PLATELET # BLD: 397 K/UL (ref 135–450)
PMV BLD AUTO: 9.2 FL (ref 5–10.5)
POTASSIUM SERPL-SCNC: 4.6 MMOL/L (ref 3.5–5.1)
RBC # BLD: 3.91 M/UL (ref 4–5.2)
SODIUM BLD-SCNC: 139 MMOL/L (ref 136–145)
TSH REFLEX: 2 UIU/ML (ref 0.27–4.2)
WBC # BLD: 13.6 K/UL (ref 4–11)

## 2018-04-10 ENCOUNTER — CARE COORDINATION (OUTPATIENT)
Dept: FAMILY MEDICINE CLINIC | Age: 78
End: 2018-04-10

## 2018-04-12 ENCOUNTER — CARE COORDINATION (OUTPATIENT)
Dept: FAMILY MEDICINE CLINIC | Age: 78
End: 2018-04-12

## 2018-04-12 DIAGNOSIS — L89.152 DECUBITUS ULCER OF SACRAL REGION, STAGE 2 (HCC): ICD-10-CM

## 2018-04-12 RX ORDER — AMLODIPINE BESYLATE 10 MG/1
5 TABLET ORAL DAILY
Qty: 135 TABLET | Refills: 0 | Status: SHIPPED | OUTPATIENT
Start: 2018-04-12 | End: 2018-05-11 | Stop reason: ALTCHOICE

## 2018-04-13 PROBLEM — L89.152 DECUBITUS ULCER OF SACRAL REGION, STAGE 2 (HCC): Status: ACTIVE | Noted: 2018-04-13

## 2018-04-16 ENCOUNTER — CARE COORDINATION (OUTPATIENT)
Dept: FAMILY MEDICINE CLINIC | Age: 78
End: 2018-04-16

## 2018-04-19 RX ORDER — DULOXETIN HYDROCHLORIDE 60 MG/1
CAPSULE, DELAYED RELEASE ORAL
Qty: 90 CAPSULE | Refills: 1 | Status: SHIPPED | OUTPATIENT
Start: 2018-04-19 | End: 2018-05-11 | Stop reason: SDUPTHER

## 2018-05-01 ENCOUNTER — CARE COORDINATION (OUTPATIENT)
Dept: CARE COORDINATION | Age: 78
End: 2018-05-01

## 2018-05-11 ENCOUNTER — OFFICE VISIT (OUTPATIENT)
Dept: FAMILY MEDICINE CLINIC | Age: 78
End: 2018-05-11

## 2018-05-11 ENCOUNTER — CARE COORDINATION (OUTPATIENT)
Dept: CARE COORDINATION | Age: 78
End: 2018-05-11

## 2018-05-11 VITALS
WEIGHT: 269.4 LBS | HEART RATE: 99 BPM | BODY MASS INDEX: 43.48 KG/M2 | DIASTOLIC BLOOD PRESSURE: 50 MMHG | OXYGEN SATURATION: 96 % | SYSTOLIC BLOOD PRESSURE: 128 MMHG

## 2018-05-11 DIAGNOSIS — T67.1XXD HEAT SYNCOPE, SUBSEQUENT ENCOUNTER: ICD-10-CM

## 2018-05-11 DIAGNOSIS — G20 PARKINSON'S DISEASE (HCC): Primary | ICD-10-CM

## 2018-05-11 DIAGNOSIS — D64.9 ANEMIA, UNSPECIFIED TYPE: ICD-10-CM

## 2018-05-11 DIAGNOSIS — E78.5 HYPERLIPIDEMIA, UNSPECIFIED HYPERLIPIDEMIA TYPE: Primary | ICD-10-CM

## 2018-05-11 DIAGNOSIS — L89.152 DECUBITUS ULCER OF SACRAL REGION, STAGE 2 (HCC): ICD-10-CM

## 2018-05-11 DIAGNOSIS — I10 ESSENTIAL HYPERTENSION: ICD-10-CM

## 2018-05-11 LAB
ALBUMIN SERPL-MCNC: 4 G/DL (ref 3.4–5)
ANION GAP SERPL CALCULATED.3IONS-SCNC: 16 MMOL/L (ref 3–16)
BUN BLDV-MCNC: 17 MG/DL (ref 7–20)
CALCIUM SERPL-MCNC: 8.8 MG/DL (ref 8.3–10.6)
CHLORIDE BLD-SCNC: 102 MMOL/L (ref 99–110)
CO2: 23 MMOL/L (ref 21–32)
CORTISOL - PM: 5.4 UG/DL (ref 3.1–16.7)
CREAT SERPL-MCNC: 0.9 MG/DL (ref 0.6–1.2)
FERRITIN: 43.9 NG/ML (ref 15–150)
FOLATE: >20 NG/ML (ref 4.78–24.2)
GFR AFRICAN AMERICAN: >60
GFR NON-AFRICAN AMERICAN: >60
GLUCOSE BLD-MCNC: 97 MG/DL (ref 70–99)
HCT VFR BLD CALC: 28.9 % (ref 36–48)
HEMOGLOBIN: 9.5 G/DL (ref 12–16)
IRON SATURATION: 18 % (ref 15–50)
IRON: 45 UG/DL (ref 37–145)
MCH RBC QN AUTO: 28.2 PG (ref 26–34)
MCHC RBC AUTO-ENTMCNC: 32.7 G/DL (ref 31–36)
MCV RBC AUTO: 86.3 FL (ref 80–100)
PDW BLD-RTO: 16.3 % (ref 12.4–15.4)
PHOSPHORUS: 2.9 MG/DL (ref 2.5–4.9)
PLATELET # BLD: 236 K/UL (ref 135–450)
PMV BLD AUTO: 9.9 FL (ref 5–10.5)
POTASSIUM SERPL-SCNC: 4.7 MMOL/L (ref 3.5–5.1)
RBC # BLD: 3.35 M/UL (ref 4–5.2)
SODIUM BLD-SCNC: 141 MMOL/L (ref 136–145)
TOTAL IRON BINDING CAPACITY: 255 UG/DL (ref 260–445)
VITAMIN B-12: 1241 PG/ML (ref 211–911)
WBC # BLD: 10 K/UL (ref 4–11)

## 2018-05-11 PROCEDURE — G8417 CALC BMI ABV UP PARAM F/U: HCPCS | Performed by: INTERNAL MEDICINE

## 2018-05-11 PROCEDURE — 1090F PRES/ABSN URINE INCON ASSESS: CPT | Performed by: INTERNAL MEDICINE

## 2018-05-11 PROCEDURE — G8599 NO ASA/ANTIPLAT THER USE RNG: HCPCS | Performed by: INTERNAL MEDICINE

## 2018-05-11 PROCEDURE — 4040F PNEUMOC VAC/ADMIN/RCVD: CPT | Performed by: INTERNAL MEDICINE

## 2018-05-11 PROCEDURE — 99214 OFFICE O/P EST MOD 30 MIN: CPT | Performed by: INTERNAL MEDICINE

## 2018-05-11 PROCEDURE — 3017F COLORECTAL CA SCREEN DOC REV: CPT | Performed by: INTERNAL MEDICINE

## 2018-05-11 PROCEDURE — 36415 COLL VENOUS BLD VENIPUNCTURE: CPT | Performed by: INTERNAL MEDICINE

## 2018-05-11 PROCEDURE — 1123F ACP DISCUSS/DSCN MKR DOCD: CPT | Performed by: INTERNAL MEDICINE

## 2018-05-11 PROCEDURE — G8427 DOCREV CUR MEDS BY ELIG CLIN: HCPCS | Performed by: INTERNAL MEDICINE

## 2018-05-11 PROCEDURE — 1036F TOBACCO NON-USER: CPT | Performed by: INTERNAL MEDICINE

## 2018-05-11 PROCEDURE — G8400 PT W/DXA NO RESULTS DOC: HCPCS | Performed by: INTERNAL MEDICINE

## 2018-05-11 RX ORDER — TOPIRAMATE 100 MG/1
TABLET, FILM COATED ORAL
Qty: 90 TABLET | Refills: 1 | Status: SHIPPED | OUTPATIENT
Start: 2018-05-11 | End: 2018-09-07 | Stop reason: ALTCHOICE

## 2018-05-11 RX ORDER — ATORVASTATIN CALCIUM 40 MG/1
TABLET, FILM COATED ORAL
Qty: 135 TABLET | Refills: 1 | Status: SHIPPED | OUTPATIENT
Start: 2018-05-11 | End: 2018-07-30 | Stop reason: SDUPTHER

## 2018-05-11 RX ORDER — LOSARTAN POTASSIUM 50 MG/1
TABLET ORAL
Qty: 90 TABLET | Refills: 1 | Status: SHIPPED | OUTPATIENT
Start: 2018-05-11 | End: 2018-07-30 | Stop reason: SDUPTHER

## 2018-05-11 RX ORDER — CELECOXIB 200 MG/1
CAPSULE ORAL
Qty: 90 CAPSULE | Refills: 1 | Status: SHIPPED | OUTPATIENT
Start: 2018-05-11 | End: 2018-07-30 | Stop reason: SDUPTHER

## 2018-05-11 RX ORDER — AMLODIPINE BESYLATE 10 MG/1
5 TABLET ORAL DAILY
Qty: 135 TABLET | Refills: 1 | Status: CANCELLED | OUTPATIENT
Start: 2018-05-11

## 2018-05-11 RX ORDER — MONTELUKAST SODIUM 10 MG/1
TABLET ORAL
Qty: 90 TABLET | Refills: 1 | Status: SHIPPED | OUTPATIENT
Start: 2018-05-11 | End: 2018-07-30 | Stop reason: SDUPTHER

## 2018-05-11 RX ORDER — GABAPENTIN 100 MG/1
CAPSULE ORAL
Qty: 90 CAPSULE | Refills: 0 | Status: SHIPPED | OUTPATIENT
Start: 2018-05-11 | End: 2018-07-30 | Stop reason: SDUPTHER

## 2018-05-11 RX ORDER — CEFUROXIME AXETIL 500 MG/1
500 TABLET ORAL 2 TIMES DAILY
Qty: 20 TABLET | Refills: 0 | Status: SHIPPED | OUTPATIENT
Start: 2018-05-11 | End: 2018-05-21

## 2018-05-11 RX ORDER — DULOXETIN HYDROCHLORIDE 60 MG/1
60 CAPSULE, DELAYED RELEASE ORAL DAILY
Qty: 90 CAPSULE | Refills: 1 | Status: SHIPPED | OUTPATIENT
Start: 2018-05-11 | End: 2018-07-30 | Stop reason: SDUPTHER

## 2018-05-15 DIAGNOSIS — D64.9 NORMOCYTIC ANEMIA: Primary | ICD-10-CM

## 2018-05-22 PROBLEM — G20.A1 PARKINSON'S DISEASE: Status: ACTIVE | Noted: 2018-05-22

## 2018-05-22 PROBLEM — G20 PARKINSON'S DISEASE (HCC): Status: ACTIVE | Noted: 2018-05-22

## 2018-05-23 ENCOUNTER — CARE COORDINATION (OUTPATIENT)
Dept: CARE COORDINATION | Age: 78
End: 2018-05-23

## 2018-06-07 ENCOUNTER — HOSPITAL ENCOUNTER (OUTPATIENT)
Dept: PHYSICAL THERAPY | Age: 78
Discharge: HOME | End: 2018-06-30
Admitting: INTERNAL MEDICINE

## 2018-06-07 NOTE — PROGRESS NOTES
LR BALANCE SCALE 14-Item Long Form Original Version     Patient Name: Sondra Rocha   Date: 6/7/2018    1. SITTING TO STANDING   INSTRUCTIONS: Please stand up. Try not to use your hands for support. (4) able to stand without using hands and stabilize independently   (3) able to stand independently using hands   (2) able to stand using hands after several tries   (1) needs minimal aid to stand or to stabilize   (0) needs moderate or maximal assist to stand   Score: 2    2. STANDING UNSUPPORTED   INSTRUCTIONS: Please stand for two minutes without holding. (4) able to stand safely 2 minutes   (3) able to stand 2 minutes with supervision   (2) able to stand 30 seconds unsupported   (1) needs several tries to stand 30 seconds unsupported   (0) unable to stand 30 seconds unassisted If a subject is able to stand 2   minutes unsupported, score full points for sitting unsupported. Proceed to   item #4. Score: 3     3. SITTING WITH BACK UNSUPPORTED BUT FEET SUPPORTED   ON FLOOR OR ON A STOOL   INSTRUCTIONS: Please sit with arms folded for 2 minutes. (4) able to sit safely and securely 2 minutes   (3) able to sit 2 minutes under supervision   (2) able to sit 30 seconds   (1) able to sit 10 seconds   (0) unable to sit without support 10 seconds   Score: 4     4. STANDING TO SITTING   INSTRUCTIONS: Please sit down. (4) sits safely with minimal use of hands   (3) controls descent by using hands   (2) uses back of legs against chair to control descent   (1) sits independently but has uncontrolled descent   (0) needs assistance to sit   Score: 1    5. TRANSFERS   INSTRUCTIONS: Arrange chairs(s) for a pivot transfer. Ask subject to   transfer one way toward a seat with armrests and one way toward a seat   without armrests. You may use two chairs (one with and one without   armrests) or a bed and a chair.    (4) able to transfer safely with minor use of hands   (3) able to transfer safely definite need of hands   (2) directly in   front of the other. If you feel that you cannot place your foot directly in front,   try to step far enough ahead that the heel of your forward foot is ahead of the   toes of the other foot. (To score 3 points, the length of the step should exceed   the length of the other foot and the width of the stance should approximate the   subject's normal stride width). (4) able to place foot tandem independently and hold 30 seconds   (3) able to place foot ahead of other independently and hold 30 seconds   (2) able to take small step independently and hold 30 seconds   (1) needs help to step but can hold 15 seconds   (0) loses balance while stepping or standing   Score: 0    14. STANDING ON ONE LEG   INSTRUCTIONS: Stand on one leg as long as you can without holding. (4) able to lift leg independently and hold >10 seconds   (3) able to lift leg independently and hold 5-10 seconds   (2) able to lift leg independently and hold = or >3 seconds   (1) tries to lift leg unable to hold 3 seconds but remains standing   independently   (0) unable to try or needs assist to prevent fall   Score: 0    Total Score: 22/56   Max score 56,a person scoring below 45 is considered to be at risk for falling.      Completed by: [place] [id number]

## 2018-06-07 NOTE — PROGRESS NOTES
weeks  Long term goal 1: Pt will be independent or modified independent with HEP  Long term goal 2: Pt will increase Oreilly Balance Scale to 31/56 or better to indicate significant change and decreased fall risk  Long term goal 3: Pt will demonstrate 4/5 strength grossly throughout B LE  Long term goal 4: Pt will report 0 falls in 4 week time frame  Long term goal 5: Pt will ambulate into clinic with 0WE with assist from  as needed       Therapy Time   Individual Concurrent Group Co-treatment   Time In 0810         Time Out 0900         Minutes 50         Timed Code Treatment Minutes: 15 Minutes       Christopher Calvillo, PT , DPT [id number]

## 2018-06-07 NOTE — FLOWSHEET NOTE
Physical Therapy Daily Treatment Note    Date:  2018    Patient Name:  Sondra Rocha    :  1940  MRN: [id number]  Restrictions/Precautions:    Medical/Treatment Diagnosis Information:  · Diagnosis: Parkinson's Disease  Insurance/Certification information:  PT Insurance Information: Medicare and Ran[employer] Tay Carrera  Physician Information:  Referring Practitioner: Steffi Gregorio MD  Plan of care signed (Y/N):  N  Visit# / total visits:       G-Code (if applicable):         PT G-Codes  Functional Assessment Tool Used: LEFS  Score: 24/80  Functional Limitation: Mobility: Walking and moving around  Mobility: Walking and Moving Around Current Status (): At least 60 percent but less than 80 percent impaired, limited or restricted  Mobility: Walking and Moving Around Goal Status ():  At least 40 percent but less than 60 percent impaired, limited or restricted    Medicare Cap (if applicable):  588 = total amount used, updated 2018    Time in:   8:10      Timed Treatment: 15 Total Treatment Time:  50  ________________________________________________________________________________________    Pain Level:   denies /10  SUBJECTIVE:  See initial eval    OBJECTIVE:     Exercise/Equipment Resistance/Repetitions Other comments          Oreilly Balance Scale    22/56    TUG with RW 27 seconds CGA           Seated LSVT BIG NV                                                                                                           Other Therapeutic Activities:  Education regarding POC including demonstration with models of anatomy and physiology in order to maximize benefits of treatment; total neuro re-ed (including oreilly/balance) 15 minutes    Manual Treatments:         Modalities:      Test/Measurements:  See initial eval       ASSESSMENT:     See initial eval    Treatment/Activity Tolerance:   [x] Patient tolerated treatment well [] Patient limited by fatique  [] Patient limited by pain [x] Patient limited by other

## 2018-06-12 ENCOUNTER — TELEPHONE (OUTPATIENT)
Dept: FAMILY MEDICINE CLINIC | Age: 78
End: 2018-06-12

## 2018-06-12 ENCOUNTER — HOSPITAL ENCOUNTER (OUTPATIENT)
Dept: PHYSICAL THERAPY | Age: 78
Discharge: HOME | End: 2018-06-13
Admitting: INTERNAL MEDICINE

## 2018-06-12 DIAGNOSIS — G20 PARKINSON'S DISEASE (HCC): Primary | ICD-10-CM

## 2018-06-12 NOTE — TELEPHONE ENCOUNTER
Nati with [id number] [facility] called stating pt has an appt today at 10:00 am.  Wants to know if Dr. Randi Pennington can put a referral in for pt to have occupational therapy for her Parkinson's. She will check Epic throughout the morning for the order.

## 2018-06-15 ENCOUNTER — HOSPITAL ENCOUNTER (OUTPATIENT)
Dept: PHYSICAL THERAPY | Age: 78
Discharge: HOME | End: 2018-06-16
Admitting: INTERNAL MEDICINE

## 2018-06-19 ENCOUNTER — HOSPITAL ENCOUNTER (OUTPATIENT)
Dept: PHYSICAL THERAPY | Age: 78
Discharge: HOME | End: 2018-06-20
Admitting: INTERNAL MEDICINE

## 2018-06-20 ENCOUNTER — TELEPHONE (OUTPATIENT)
Dept: FAMILY MEDICINE CLINIC | Age: 78
End: 2018-06-20

## 2018-06-20 DIAGNOSIS — G20 PARKINSON'S DISEASE (HCC): Primary | ICD-10-CM

## 2018-06-21 ENCOUNTER — HOSPITAL ENCOUNTER (OUTPATIENT)
Dept: PHYSICAL THERAPY | Age: 78
Discharge: HOME | End: 2018-06-22
Admitting: INTERNAL MEDICINE

## 2018-06-26 ENCOUNTER — HOSPITAL ENCOUNTER (OUTPATIENT)
Dept: PHYSICAL THERAPY | Age: 78
Discharge: HOME | End: 2018-06-27
Admitting: INTERNAL MEDICINE

## 2018-06-28 ENCOUNTER — HOSPITAL ENCOUNTER (OUTPATIENT)
Dept: PHYSICAL THERAPY | Age: 78
Discharge: HOME | End: 2018-06-29
Admitting: INTERNAL MEDICINE

## 2018-06-29 ENCOUNTER — HOSPITAL ENCOUNTER (OUTPATIENT)
Dept: MAMMOGRAPHY | Age: 78
Discharge: HOME | End: 2018-06-29
Attending: INTERNAL MEDICINE | Admitting: INTERNAL MEDICINE

## 2018-06-29 DIAGNOSIS — Z12.31 ENCOUNTER FOR SCREENING MAMMOGRAM FOR BREAST CANCER: ICD-10-CM

## 2018-07-01 ENCOUNTER — HOSPITAL ENCOUNTER (OUTPATIENT)
Dept: OTHER | Age: 78
Discharge: HOME | End: 2018-07-01
Attending: INTERNAL MEDICINE | Admitting: INTERNAL MEDICINE

## 2018-07-03 ENCOUNTER — HOSPITAL ENCOUNTER (OUTPATIENT)
Dept: PHYSICAL THERAPY | Age: 78
Discharge: HOME | End: 2018-07-04
Admitting: INTERNAL MEDICINE

## 2018-07-03 NOTE — FLOWSHEET NOTE
sides. No evidence of SOB      - Exercise 7 5x B HEP; no breaks needed   Sit<>stand 5x  HEP   Big walking 2x130 feet   Did not have boot on this date. Improved endurance and less SOB/tremor following ambulation. Took one seated rest break due to fatigue                        nustep    HR 101bpm O2 sat 97% post nustep            Other Therapeutic Activities:      Manual Treatments:         Modalities:      Test/Measurements:  See initial eval       ASSESSMENT:  Pt demonstrating improved tolerance to exercise, as evidenced by less SOB and tremor following exercise and less rest breaks. Progressed some of the standard exercises to standing this date with increased fatigue toward the end of the session noted. Continues to increase her overall ambulation distance. Progress as pt tolerates.      Treatment/Activity Tolerance:   [x] Patient tolerated treatment well [x] Patient limited by fatique  [] Patient limited by pain [] Patient limited by other medical complications  [] Other:     Goals:          Long term goals  Time Frame for Long term goals : 4 weeks  Long term goal 1: Pt will be independent or modified independent with HEP  Long term goal 2: Pt will increase Oreilly Balance Scale to 31/56 or better to indicate significant change and decreased fall risk  Long term goal 3: Pt will demonstrate 4/5 strength grossly throughout B LE  Long term goal 4: Pt will report 0 falls in 4 week time frame  Long term goal 5: Pt will ambulate into clinic with 9OI with assist from  as needed     Plan: [x] Continue per plan of care [] Alter current plan (see comments)   [] Plan of care initiated [] Hold pending MD visit [] Discharge      Plan for Next Session:  LSVT BIG    Re-Certification Due Date:         Signature:  Stephanie Gipson, PT

## 2018-07-16 ENCOUNTER — TELEPHONE (OUTPATIENT)
Dept: FAMILY MEDICINE CLINIC | Age: 78
End: 2018-07-16

## 2018-07-16 DIAGNOSIS — E11.22 TYPE 2 DIABETES MELLITUS WITH STAGE 3 CHRONIC KIDNEY DISEASE, WITH LONG-TERM CURRENT USE OF INSULIN (HCC): Primary | ICD-10-CM

## 2018-07-16 DIAGNOSIS — N18.30 TYPE 2 DIABETES MELLITUS WITH STAGE 3 CHRONIC KIDNEY DISEASE, WITH LONG-TERM CURRENT USE OF INSULIN (HCC): Primary | ICD-10-CM

## 2018-07-16 DIAGNOSIS — Z79.4 TYPE 2 DIABETES MELLITUS WITH STAGE 3 CHRONIC KIDNEY DISEASE, WITH LONG-TERM CURRENT USE OF INSULIN (HCC): Primary | ICD-10-CM

## 2018-07-16 RX ORDER — AMLODIPINE BESYLATE 10 MG/1
5 TABLET ORAL DAILY
COMMUNITY
End: 2018-07-30 | Stop reason: ALTCHOICE

## 2018-07-17 ENCOUNTER — HOSPITAL ENCOUNTER (OUTPATIENT)
Dept: OCCUPATIONAL THERAPY | Age: 78
Setting detail: THERAPIES SERIES
Discharge: HOME | End: 2018-07-17
Payer: MEDICARE

## 2018-07-17 ENCOUNTER — HOSPITAL ENCOUNTER (OUTPATIENT)
Age: 78
Discharge: HOME | End: 2018-07-17
Payer: MEDICARE

## 2018-07-17 ENCOUNTER — HOSPITAL ENCOUNTER (OUTPATIENT)
Dept: SPEECH THERAPY | Age: 78
Setting detail: THERAPIES SERIES
Discharge: HOME | End: 2018-07-17
Payer: MEDICARE

## 2018-07-17 LAB
C-REACTIVE PROTEIN: 4.9 MG/L (ref 0–5.1)
SEDIMENTATION RATE, ERYTHROCYTE: 30 MM/HR (ref 0–30)
WBC # BLD: 10.8 K/UL (ref 4–11)

## 2018-07-17 PROCEDURE — 97110 THERAPEUTIC EXERCISES: CPT

## 2018-07-17 PROCEDURE — 85048 AUTOMATED LEUKOCYTE COUNT: CPT

## 2018-07-17 PROCEDURE — 97530 THERAPEUTIC ACTIVITIES: CPT

## 2018-07-17 PROCEDURE — 86140 C-REACTIVE PROTEIN: CPT

## 2018-07-17 PROCEDURE — 97127 HC SP THER IVNTJ W/FOCUS COG FUNCJ: CPT

## 2018-07-17 PROCEDURE — 36415 COLL VENOUS BLD VENIPUNCTURE: CPT

## 2018-07-17 PROCEDURE — 85652 RBC SED RATE AUTOMATED: CPT

## 2018-07-17 PROCEDURE — 92507 TX SP LANG VOICE COMM INDIV: CPT

## 2018-07-17 NOTE — FLOWSHEET NOTE
tasks with 90% accuracy min cues. - High level naming task: Pt was able to independently produce 4-5 items within high level category (e.g. Things you do wearing gloves). The strategy of subcategories was discussed    Goal 5: Pt will say individual sentence productions at 75 dB or greater on average with min cues.   -73.5 dB,  Min cues    Other treatments:  - Modified LSVT daily sentences: average of 74.3 dB    Assessment: Improvement in divided attention noted. Plan: Speech treatment x2 week for 4 weeks     Timed Code Treatment:  30 minutes     Total Treatment Time: 60 minutes (1345-0138);  Speech treatment; Cognitive skills development x 2    Signature:    Nelia Vasquez  Speech-Language Pathology      [place], [place], [address] [place] [id number]  Speech-Language Pathologist  Phone: [phone]  Fax: [phone]

## 2018-07-19 ENCOUNTER — HOSPITAL ENCOUNTER (OUTPATIENT)
Dept: OCCUPATIONAL THERAPY | Age: 78
Setting detail: THERAPIES SERIES
Discharge: HOME | End: 2018-07-19
Payer: MEDICARE

## 2018-07-19 ENCOUNTER — HOSPITAL ENCOUNTER (OUTPATIENT)
Dept: SPEECH THERAPY | Age: 78
Setting detail: THERAPIES SERIES
Discharge: HOME | End: 2018-07-19
Payer: MEDICARE

## 2018-07-19 PROCEDURE — G9169 MEMORY GOAL STATUS: HCPCS

## 2018-07-19 PROCEDURE — G9168 MEMORY CURRENT STATUS: HCPCS

## 2018-07-19 PROCEDURE — G8988 SELF CARE GOAL STATUS: HCPCS

## 2018-07-19 PROCEDURE — 97110 THERAPEUTIC EXERCISES: CPT

## 2018-07-19 PROCEDURE — 92507 TX SP LANG VOICE COMM INDIV: CPT

## 2018-07-19 PROCEDURE — 97127 HC SP THER IVNTJ W/FOCUS COG FUNCJ: CPT

## 2018-07-19 PROCEDURE — 97530 THERAPEUTIC ACTIVITIES: CPT

## 2018-07-19 PROCEDURE — G8987 SELF CARE CURRENT STATUS: HCPCS

## 2018-07-19 NOTE — PROGRESS NOTES
Outpatient Speech Therapy   Phone: [phone] Fax: [phone]    Speech Therapy Progress Note    Date: 2018        Patient Name:  Edis Dunn    :  1940  MRN: [id number]  Diagnosis: Parkinson's Disease  Treatment Diagnosis: Mild-mod cognitive deficits   Insurance/Certification information: Medicare  Referring Physician:  Kalpana Hodge MD    Plan of care signed (Y/N): Faxed    Visit# / total visits:    Medicare Cap (if applicable): As of : $974.21 for ST  Pain level:      5-6/10  In her right knee.       The following patient has been evaluated for therapy services. Please review the attached summary of the patient's plan of care, and verify that you agree with plan for additional therapy services at this time.      Thank you for the referral of this patient. Please sign the attached certification form.      Physician signature_______________________ Date________________  Ruiz Lay to: [facility] [phone]      G-Code (if applicable):                                             Date / Visit # G-Code Applied:    Memory Current Status:                           2018  Memory Goal Status:  [address]                              2018     Time Period for Report:  18 to 18  Cancels/No-shows to date:  None    Plan of Care/Treatment to date:  [x] Speech-Language Evaluation/Treatment    [] Dysphagia Evaluation/Treatment        [] Dysphagia Treatment via Neuromuscular Electrical Stimulation (NMES)   [] Modified Barium Swallowing Study  [] Fiberoptic Endoscopic Evaluation of Swallowing (FEES)    [x] Cognitive-Linguistic Skills Development  [x] Voice evaluation and Treatment      [] Evaluation, modification, and Training of Voice Prosthetic     [] Evaluation for Speech-Generating Augmentative and Alternative Communication Device   [] Therapeutic Services for the use of Speech-Generating Device.    [] Other:     Significant Findings At [address] Comprehensive

## 2018-07-19 NOTE — FLOWSHEET NOTE
independence with HEP as knee discomfort allows. Treatment/Activity Tolerance:   [x] Patient tolerated treatment well [] Patient limited by fatique  [] Patient limited by pain [] Patient limited by other medical complications  [] Other:     Goals:    Goals  Short term goals  Time Frame for Short term goals: 2 weeks  Short term goal 1: Patient will be able to walk to the bathroom with increased performance with increased performance as rated via COPM (currently rated at 6/10). - 9/10 rated on 7/13 with use walker  Short term goal 2: Patient will be able to walk from the car to the store with a shopping cart or walker if needed. - PROGRESSING with driveway walking with walker, and walking into a restaurant with a cane 7/13  Short term goal 3: Patient and caregiver will be I with once daily modified LSVT exercises. - GOAL MET 6/22  Short term goal 4: Patient will be able to wipe off a counter with RUE in standing. - PROGRESSING 7/13, \"I stood this morning for brushing my teeth and washing my face\". Long term goals  Time Frame for Long term goals : 4 weeks  Long term goal 1: Patient will be able to get onto a three wheeled motorcycle with assist from spouse as needed. - HAS NOT ATTEMPTED 7/13  Long term goal 2: Patient and caregiver will be I with one-two times daily modified LSVT exercises. Long term goal 3: Patient will be able to walk to the bathroom with increased performance with increased performance (to 9/10) as rated via COPM (currently rated at 6/10).  -   Patient Goals   Patient goals : \"I want to be able to get onto a motorcycle again\"    Patient Requires Follow-up:  [x]  Yes  []  No    Plan: [x] Continue per plan of care (1-4x/week for 4 weeks) [] Alter current plan (1-4x/week for 4 weeks)   [] Plan of care initiated [] Hold pending MD visit [] Discharge     Plan for Next Session:  Progress plan of care as tolerated, work toward occupational goals for improving independence with daily and meaningful

## 2018-07-19 NOTE — FLOWSHEET NOTE
level naming task: 20%, min cues    Goal 5: Pt will say individual sentence productions at 75 dB or greater on average with min cues.   -76.9 dB,  Min cues  GOAL MET    NEW GOAL:   - The pt will speak in conversational speech at 75 dB or greater on average with min cues. Other treatments:  Modified LSVT LOUD:  - \"Ah\" Prolongations x 5; Ranged from 84 and 86 dB SPL; Ranged from 2.41 to 5.05 seconds; Needed easy onset (\"Hah\") and cues to not over tighten her larynx during these productions.   - Raising pitch: x 5 reps; Had difficulty controlling pitch, tending to go into falsetto  - Lowering pitch: x 5 reps    Assessment:   Significantly improved overall vocal effort and volume. Plan: Speech treatment x2 week for 4 weeks     Timed Code Treatment:  32 minutes     Total Treatment Time: 61 minutes (8273-3588); Speech treatment; Cognitive skills development x 2    Signature:     Douglas Mcclure MA, [address] [id number]  Speech-Language Pathologist  Phone: [phone]  Fax: [phone]

## 2018-07-24 ENCOUNTER — HOSPITAL ENCOUNTER (OUTPATIENT)
Dept: OCCUPATIONAL THERAPY | Age: 78
Setting detail: THERAPIES SERIES
Discharge: HOME | End: 2018-07-24
Payer: MEDICARE

## 2018-07-24 ENCOUNTER — HOSPITAL ENCOUNTER (OUTPATIENT)
Dept: SPEECH THERAPY | Age: 78
Setting detail: THERAPIES SERIES
Discharge: HOME | End: 2018-07-24
Payer: MEDICARE

## 2018-07-24 PROCEDURE — 97112 NEUROMUSCULAR REEDUCATION: CPT

## 2018-07-24 PROCEDURE — 92507 TX SP LANG VOICE COMM INDIV: CPT

## 2018-07-24 PROCEDURE — 97530 THERAPEUTIC ACTIVITIES: CPT

## 2018-07-24 PROCEDURE — 97110 THERAPEUTIC EXERCISES: CPT

## 2018-07-24 PROCEDURE — 97127 HC SP THER IVNTJ W/FOCUS COG FUNCJ: CPT

## 2018-07-24 RX ORDER — FLUTICASONE PROPIONATE 50 MCG
SPRAY, SUSPENSION (ML) NASAL
Qty: 1 BOTTLE | Refills: 12 | Status: SHIPPED | OUTPATIENT
Start: 2018-07-24 | End: 2019-06-19 | Stop reason: SDUPTHER

## 2018-07-24 NOTE — FLOWSHEET NOTE
states that patient has MCL irritation and will be starting PT for that. OBJECTIVE:       Therapeutic Exercise:     Exercise/Equipment  Resistance/Repetitions   Other comments   LSVT BIG Exercise 1, 5x, min cues for breathing and intensity with tremors noted     Exercise 2, 5x min cues for breathing and intensity, leaning onto chair for support       Exercise 3, 5x, in standing for RLE stepping, CGA for balance, min cues for big fingers/big leg, mod cues for breathing throughout    5x seated for other exercises    Holding counter for support in standing    Exercise 4, 5x, min cues for big leg/big fingers seated    Exercise 5, 5x, min cues seated    Exercise 6, 5x min cues for full range seated    Exercise 7, 5x,min cues for full range seated                                   ADL/Therapeutic Activities:       Exercise/Equipment Resistance/Repetitions   Other comments   BIG Walking     460 feet with 1 break, walker for part of the trip      BIG Sit to stand 2x, CGA with arm rests, easily fatigued    1x without arm rests with mod A Mod cues for breathing, limited by RLE knee pain     Manual Treatments:       Exercise/Equipment Resistance/Repetitions   Other comments                    Modalities:       Mode/Equipment  Resistance/Repetitions   Other comments                    Home Exercise Program:  Exercises 1-8, 2x/day       ASSESSMENT: Patient progressing in endurance for walking with RW, but requires max cues for intensity and use of arms in order to reduce RLE knee pain. Patient has more difficulty with standing level exercises where she is supporting self on RLE for stepping with the left. Patient would benefit from continued participation in modified LSVT exercises due to knee pain and to maintain endurance and progress function as she is able to tolerate.   Both patient and spouse verbalize understanding of recommendation to use RW at home in order to reduce potential for provoking knee pain and to complete

## 2018-07-24 NOTE — TELEPHONE ENCOUNTER
.  Last office visit 3/5/2018     Last written 2-4-18 16g with 5 refills      Next office visit scheduled 7/30/2018    Requested Prescriptions     Pending Prescriptions Disp Refills    fluticasone (FLONASE) 50 MCG/ACT nasal spray [Pharmacy Med Name: FLUTICASONE 50 MCG NASAL SPR]  4     Sig: USE 2 PUFFS IN EACH NOSTRIL DAILY

## 2018-07-25 ENCOUNTER — HOSPITAL ENCOUNTER (OUTPATIENT)
Dept: OCCUPATIONAL THERAPY | Age: 78
Setting detail: THERAPIES SERIES
Discharge: HOME | End: 2018-07-25
Payer: MEDICARE

## 2018-07-25 PROCEDURE — 97110 THERAPEUTIC EXERCISES: CPT

## 2018-07-25 PROCEDURE — 97530 THERAPEUTIC ACTIVITIES: CPT

## 2018-07-25 NOTE — FLOWSHEET NOTE
for Next Session:  Progress plan of care as tolerated, work toward occupational goals for improving independence with daily and meaningful activities    Interactive communication between patient's primary therapist and covering therapist was performed in order to ensure accurate information regarding the patient's current condition, treatment and planned interventions as well as recent or anticipated changes in the plan of care.      Electronically signed by:  George Alba

## 2018-07-26 ENCOUNTER — HOSPITAL ENCOUNTER (OUTPATIENT)
Dept: SPEECH THERAPY | Age: 78
Setting detail: THERAPIES SERIES
Discharge: HOME | End: 2018-07-26
Payer: MEDICARE

## 2018-07-26 PROCEDURE — 97127 HC SP THER IVNTJ W/FOCUS COG FUNCJ: CPT

## 2018-07-26 PROCEDURE — 92507 TX SP LANG VOICE COMM INDIV: CPT

## 2018-07-27 ENCOUNTER — APPOINTMENT (OUTPATIENT)
Dept: PHYSICAL THERAPY | Age: 78
End: 2018-07-27
Payer: MEDICARE

## 2018-07-27 ENCOUNTER — HOSPITAL ENCOUNTER (OUTPATIENT)
Dept: PHYSICAL THERAPY | Age: 78
Setting detail: THERAPIES SERIES
Discharge: HOME | End: 2018-07-27
Payer: MEDICARE

## 2018-07-27 PROCEDURE — G8979 MOBILITY GOAL STATUS: HCPCS

## 2018-07-27 PROCEDURE — 97110 THERAPEUTIC EXERCISES: CPT

## 2018-07-27 PROCEDURE — 97112 NEUROMUSCULAR REEDUCATION: CPT

## 2018-07-27 PROCEDURE — 97162 PT EVAL MOD COMPLEX 30 MIN: CPT

## 2018-07-27 PROCEDURE — G8978 MOBILITY CURRENT STATUS: HCPCS

## 2018-07-27 ASSESSMENT — PAIN DESCRIPTION - ORIENTATION: ORIENTATION: RIGHT

## 2018-07-27 ASSESSMENT — PAIN DESCRIPTION - DESCRIPTORS: DESCRIPTORS: ACHING

## 2018-07-27 ASSESSMENT — PAIN SCALES - GENERAL: PAINLEVEL_OUTOF10: 2

## 2018-07-27 ASSESSMENT — PAIN DESCRIPTION - LOCATION: LOCATION: KNEE

## 2018-07-27 ASSESSMENT — PAIN DESCRIPTION - FREQUENCY: FREQUENCY: INTERMITTENT

## 2018-07-27 ASSESSMENT — PAIN DESCRIPTION - PAIN TYPE: TYPE: ACUTE PAIN

## 2018-07-27 NOTE — PROGRESS NOTES
Outpatient Physical Therapy  Phone: [phone] Fax: [phone]     To: Jos Rodriguez MD    From: Marivel Andre PT     Patient: Kelley Simeon      : 1940  Diagnosis:  R knee pain    Date: 2018  Treatment Diagnosis:      Physical Therapy Certification/Re-Certification Form  Dear Dr. Maxine Villalobos,   The following patient has been evaluated for physical therapy services and for therapy to continue, Medicare requires monthly physician review of the treatment plan. Please review the attached evaluation and/or summary of the patient's plan of care, and verify that you agree therapy should continue by signing the attached document and sending it back to our office. Plan of Care/Treatment to date:  [x] Therapeutic Exercise   [] Modalities:  [x] Therapeutic Activity     [] Ultrasound  [] Electrical Stimulation   [x] Gait Training      [] Cervical Traction [] Lumbar Traction  [x] Neuromuscular Re-education  [] Hot/Coldpack [] Iontophoresis    [x] Instruction in HEP      Other:  [x] Manual Therapy       []                        [] Aquatic Therapy       []                      ? Frequency/Duration:  # Days per week: [x] 1 day # Weeks: [] 1 week [] 5 weeks      [x] 2 days? [] 2 weeks [] 6 weeks     [] 3 days   [] 3 weeks [] 7 weeks     [] 4 days   [x] 4 weeks [] 8 weeks    Rehab Potential: [] Excellent [x] Good [] Fair  [] Poor       Electronically signed by:  Marivel Andre PT , DPT [id number]    If you have any questions or concerns, please don't hesitate to call.   Thank you for your referral.    Physician Signature:________________________________Date:__________________  By signing above, therapists plan is approved by physician
postural training, gait training, therapeutic activity, manual therapy, pt education, and modalities PRN    G-Code  PT G-Codes  Functional Assessment Tool Used: LEFS  Score: 30/80  Functional Limitation: Mobility: Walking and moving around  Mobility: Walking and Moving Around Current Status (): At least 60 percent but less than 80 percent impaired, limited or restricted  Mobility: Walking and Moving Around Goal Status ():  At least 40 percent but less than 60 percent impaired, limited or restricted    Goals  Long term goals  Time Frame for Long term goals : 4 weeks  Long term goal 1: Pt will be independent with HEP  Long term goal 2: Pt will report 50% improvement in frequency and intensity of pain  Long term goal 3: Pt will ambulate community distances without occurrance of knee pain  Long term goal 4: Pt will transfer sit<>stand without report of knee pain  Long term goal 5: Pt will increase B LE strength to grossly 4/5 or better       Therapy Time   Individual Concurrent Group Co-treatment   Time In 0800         Time Out 0900         Minutes 60         Timed Code Treatment Minutes: 25 Minutes       Kaylan Keita PT , DPT [id number]

## 2018-07-27 NOTE — FLOWSHEET NOTE
Physical Therapy Daily Treatment Note    Date:  2018    Patient Name:  Yolanda Cross    :  1940  MRN: [id number]  Restrictions/Precautions:    Medical/Treatment Diagnosis Information:  · Diagnosis: R knee pain   Insurance/Certification information:  PT Insurance Information: Medicare and [address]  Physician Information:  Referring Practitioner: Adebayo Harris MD  Plan of care signed (Y/N):  N  Visit# / total visits:       G-Code (if applicable):         PT G-Codes  Functional Assessment Tool Used: LEFS  Score: 30/80  Functional Limitation: Mobility: Walking and moving around  Mobility: Walking and Moving Around Current Status (): At least 60 percent but less than 80 percent impaired, limited or restricted  Mobility: Walking and Moving Around Goal Status ():  At least 40 percent but less than 60 percent impaired, limited or restricted    Medicare Cap (if applicable):  896 = total amount used, updated 2018    Time in:   8:00      Timed Treatment: 25 Total Treatment Time:  60  ________________________________________________________________________________________    Pain Level:    /10  SUBJECTIVE:  See initial eval    OBJECTIVE:     Exercise/Equipment Resistance/Repetitions Other comments   nustep   Level 4 x10 minutes HR 99 bpm O2 sat 98%   QS in supine  10x5\" B HEP   SAQ 10x B HEP   Bridge 10x HEP   clamshell 10x5\" B HEP   SLR NV    Seated triple extension NV           Gait training   NV    FSU NV                                                                      Other Therapeutic Activities:  Education regarding POC including demonstration with models of anatomy and physiology in order to maximize benefits of treatment; total neuro re-ed 10 minutes    Manual Treatments:         Modalities:      Test/Measurements:  See initial eval       ASSESSMENT:    See initial eval     Treatment/Activity Tolerance:   [x] Patient tolerated treatment well [] Patient limited by papo  []

## 2018-07-30 ENCOUNTER — CARE COORDINATION (OUTPATIENT)
Dept: CARE COORDINATION | Age: 78
End: 2018-07-30

## 2018-07-30 ENCOUNTER — HOSPITAL ENCOUNTER (OUTPATIENT)
Dept: PHYSICAL THERAPY | Age: 78
Setting detail: THERAPIES SERIES
Discharge: HOME | End: 2018-07-30
Payer: MEDICARE

## 2018-07-30 ENCOUNTER — OFFICE VISIT (OUTPATIENT)
Dept: FAMILY MEDICINE CLINIC | Age: 78
End: 2018-07-30

## 2018-07-30 VITALS
HEIGHT: 66 IN | SYSTOLIC BLOOD PRESSURE: 152 MMHG | DIASTOLIC BLOOD PRESSURE: 72 MMHG | OXYGEN SATURATION: 98 % | WEIGHT: 255 LBS | BODY MASS INDEX: 40.98 KG/M2 | HEART RATE: 84 BPM

## 2018-07-30 DIAGNOSIS — E11.22 TYPE 2 DIABETES MELLITUS WITH STAGE 3 CHRONIC KIDNEY DISEASE, WITH LONG-TERM CURRENT USE OF INSULIN (HCC): ICD-10-CM

## 2018-07-30 DIAGNOSIS — Z79.4 TYPE 2 DIABETES MELLITUS WITH STAGE 3 CHRONIC KIDNEY DISEASE, WITH LONG-TERM CURRENT USE OF INSULIN (HCC): ICD-10-CM

## 2018-07-30 DIAGNOSIS — Z23 NEED FOR PROPHYLACTIC VACCINATION AND INOCULATION AGAINST VARICELLA: ICD-10-CM

## 2018-07-30 DIAGNOSIS — N18.30 CKD (CHRONIC KIDNEY DISEASE) STAGE 3, GFR 30-59 ML/MIN (HCC): ICD-10-CM

## 2018-07-30 DIAGNOSIS — N18.30 TYPE 2 DIABETES MELLITUS WITH STAGE 3 CHRONIC KIDNEY DISEASE, WITH LONG-TERM CURRENT USE OF INSULIN (HCC): ICD-10-CM

## 2018-07-30 DIAGNOSIS — E11.22 TYPE 2 DIABETES MELLITUS WITH STAGE 3 CHRONIC KIDNEY DISEASE, UNSPECIFIED LONG TERM INSULIN USE STATUS: Primary | ICD-10-CM

## 2018-07-30 DIAGNOSIS — G20 PARKINSON'S DISEASE (HCC): ICD-10-CM

## 2018-07-30 DIAGNOSIS — E78.00 PURE HYPERCHOLESTEROLEMIA: ICD-10-CM

## 2018-07-30 DIAGNOSIS — N18.3 TYPE 2 DIABETES MELLITUS WITH STAGE 3 CHRONIC KIDNEY DISEASE, UNSPECIFIED LONG TERM INSULIN USE STATUS: Primary | ICD-10-CM

## 2018-07-30 DIAGNOSIS — Z23 NEED FOR PROPHYLACTIC VACCINATION AGAINST DIPHTHERIA-TETANUS-PERTUSSIS (DTP): ICD-10-CM

## 2018-07-30 LAB
CHOLESTEROL, FASTING: 170 MG/DL (ref 0–199)
CREATININE URINE: 34.8 MG/DL (ref 28–259)
HBA1C MFR BLD: 6.3 %
HDLC SERPL-MCNC: 82 MG/DL (ref 40–60)
LDL CHOLESTEROL CALCULATED: 74 MG/DL
MICROALBUMIN UR-MCNC: 2.5 MG/DL
MICROALBUMIN/CREAT UR-RTO: 71.8 MG/G (ref 0–30)
TRIGLYCERIDE, FASTING: 72 MG/DL (ref 0–150)
VLDLC SERPL CALC-MCNC: 14 MG/DL

## 2018-07-30 PROCEDURE — 1101F PT FALLS ASSESS-DOCD LE1/YR: CPT | Performed by: INTERNAL MEDICINE

## 2018-07-30 PROCEDURE — 1036F TOBACCO NON-USER: CPT | Performed by: INTERNAL MEDICINE

## 2018-07-30 PROCEDURE — 97116 GAIT TRAINING THERAPY: CPT

## 2018-07-30 PROCEDURE — 1090F PRES/ABSN URINE INCON ASSESS: CPT | Performed by: INTERNAL MEDICINE

## 2018-07-30 PROCEDURE — G8427 DOCREV CUR MEDS BY ELIG CLIN: HCPCS | Performed by: INTERNAL MEDICINE

## 2018-07-30 PROCEDURE — 1123F ACP DISCUSS/DSCN MKR DOCD: CPT | Performed by: INTERNAL MEDICINE

## 2018-07-30 PROCEDURE — 97110 THERAPEUTIC EXERCISES: CPT

## 2018-07-30 PROCEDURE — G8400 PT W/DXA NO RESULTS DOC: HCPCS | Performed by: INTERNAL MEDICINE

## 2018-07-30 PROCEDURE — 83036 HEMOGLOBIN GLYCOSYLATED A1C: CPT | Performed by: INTERNAL MEDICINE

## 2018-07-30 PROCEDURE — G8599 NO ASA/ANTIPLAT THER USE RNG: HCPCS | Performed by: INTERNAL MEDICINE

## 2018-07-30 PROCEDURE — 99214 OFFICE O/P EST MOD 30 MIN: CPT | Performed by: INTERNAL MEDICINE

## 2018-07-30 PROCEDURE — 4040F PNEUMOC VAC/ADMIN/RCVD: CPT | Performed by: INTERNAL MEDICINE

## 2018-07-30 PROCEDURE — G8417 CALC BMI ABV UP PARAM F/U: HCPCS | Performed by: INTERNAL MEDICINE

## 2018-07-30 RX ORDER — LOSARTAN POTASSIUM 50 MG/1
TABLET ORAL
Qty: 90 TABLET | Refills: 1 | Status: SHIPPED | OUTPATIENT
Start: 2018-07-30 | End: 2018-09-14 | Stop reason: SDUPTHER

## 2018-07-30 RX ORDER — CELECOXIB 200 MG/1
CAPSULE ORAL
Qty: 90 CAPSULE | Refills: 1 | Status: SHIPPED | OUTPATIENT
Start: 2018-07-30 | End: 2019-04-01

## 2018-07-30 RX ORDER — DULOXETIN HYDROCHLORIDE 60 MG/1
60 CAPSULE, DELAYED RELEASE ORAL DAILY
Qty: 90 CAPSULE | Refills: 1 | Status: SHIPPED | OUTPATIENT
Start: 2018-07-30 | End: 2019-04-20 | Stop reason: SDUPTHER

## 2018-07-30 RX ORDER — MONTELUKAST SODIUM 10 MG/1
TABLET ORAL
Qty: 90 TABLET | Refills: 1 | Status: SHIPPED | OUTPATIENT
Start: 2018-07-30 | End: 2019-07-15 | Stop reason: SDUPTHER

## 2018-07-30 RX ORDER — GABAPENTIN 100 MG/1
CAPSULE ORAL
Qty: 90 CAPSULE | Refills: 0 | Status: SHIPPED | OUTPATIENT
Start: 2018-07-30 | End: 2018-11-21 | Stop reason: SDUPTHER

## 2018-07-30 RX ORDER — ATORVASTATIN CALCIUM 40 MG/1
TABLET, FILM COATED ORAL
Qty: 135 TABLET | Refills: 1 | Status: SHIPPED | OUTPATIENT
Start: 2018-07-30 | End: 2019-07-18 | Stop reason: SDUPTHER

## 2018-07-30 RX ORDER — ALBUTEROL SULFATE 90 UG/1
2 AEROSOL, METERED RESPIRATORY (INHALATION) EVERY 6 HOURS PRN
Qty: 3 INHALER | Refills: 1 | Status: SHIPPED | OUTPATIENT
Start: 2018-07-30 | End: 2021-01-08 | Stop reason: SDUPTHER

## 2018-07-30 NOTE — FLOWSHEET NOTE
Physical Therapy Daily Treatment Note    Date:  2018    Patient Name:  Clement Sheikh    :  1940  MRN: [id number]  Restrictions/Precautions:    Medical/Treatment Diagnosis Information:  · Diagnosis: R knee pain   Insurance/Certification information:  PT Insurance Information: Medicare and [address]  Physician Information:  Referring Practitioner: Aaliyah Magana MD  Plan of care signed (Y/N):  N  Visit# / total visits:  28     G-Code (if applicable):         PT G-Codes  Functional Assessment Tool Used: LEFS  Score: 30/80  Functional Limitation: Mobility: Walking and moving around  Mobility: Walking and Moving Around Current Status (): At least 60 percent but less than 80 percent impaired, limited or restricted  Mobility: Walking and Moving Around Goal Status (): At least 40 percent but less than 60 percent impaired, limited or restricted    Medicare Cap (if applicable):  715 = total amount used, updated 2018    Time in:   7:00      Timed Treatment: 40 Total Treatment Time:  40  ________________________________________________________________________________________    Pain Level:    /10  SUBJECTIVE:  Pt reports that her knee hasn't hurt much all weekend.  Walked with a walker into the restaurant     OBJECTIVE:     Exercise/Equipment Resistance/Repetitions Other comments   nustep   Level 4 x6 minutes HR 99 bpm O2 sat 98%   QS in supine  10x5\" B HEP   SAQ 10x B HEP   Bridge 10x HEP   clamshell 10x5\" B HEP   SLR 10x R HEP   Seated triple extension 10x5\" R HEP          Gait training   x5 minutes with RW Cues for knee extension and longer steps; stopped due to fatigue, no knee pain   FSU NV                                                                      Other Therapeutic Activities:  Education regarding POC including demonstration with models of anatomy and physiology in order to maximize benefits of treatment; total neuro re-ed 10 minutes    Manual Treatments:         Modalities:

## 2018-07-30 NOTE — PROGRESS NOTES
SUBJECTIVE:  CC: Diabetes    HPI:  Kd Lozada presents for follow up and evaluation of diabetes. 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 BP is still well controlled. Home blood glucose log brought to visit: Yes. she has No symptoms of hypoglycemia. she has No symptoms of hyperglycemia. New anemia needs further evaluation. Pressure ulcer improving with local care. The patient denied polyphagia, polydipsia, or polyuria. The last HBA1C and routine lab was   Lab Results   Component Value Date    LABA1C 6.9 01/22/2018     Lab Results   Component Value Date    .3 02/10/2017     Lab Results   Component Value Date    WBC 10.8 07/17/2018    HGB 9.5 (L) 05/11/2018    HCT 28.9 (L) 05/11/2018     05/11/2018    CHOL 186 07/03/2017    TRIG 121 07/03/2017    HDL 75 (H) 07/03/2017    ALT <5 (L) 04/23/2017    AST 22 04/23/2017     05/11/2018    K 4.7 05/11/2018     05/11/2018    CREATININE 0.9 05/11/2018    BUN 17 05/11/2018    CO2 23 05/11/2018    TSH 1.93 12/20/2011    INR 1.02 02/10/2017    LABA1C 6.9 01/22/2018    LABMICR Not Indicated 03/15/2017       Has been taking meds as ordered Yes.  she has no side effects from the current diabetes medications. Review Of Systems:   Constitutional: No fatigue or weight loss. Respiratory: No cough or dyspnea. Cardiovascular: No chest pain or palpitations. Gastrointestinal: No constipation or diarrhea.      OBJECTIVE:    VS: BP (!) 152/72 (Site: Left Arm, Position: Sitting, Cuff Size: Large Adult)   Pulse 84   Ht 5' 6\" (1.676 m)   Wt 255 lb (115.7 kg)   SpO2 98% Comment: RA  BMI 41.16 kg/m²   General appearance: Alert, Awake, Oriented times 3, no distress  Skin: Warm and dry  Lungs: Lungs clear to auscultation bilaterally. No rhonchi, crackles or wheezes  Heart: S1 S2  Regular rate and rhythm. No rub, murmur or gallop  Extremities: No edema, Peripheral pulses palpable. Superficial pressure ulcer 3 cm, no surrounding erythema or drainage. Feet:    ASSESSMENT/PLAN:    Lukas Maxwell was seen today for hypertension and diabetes. Diagnoses and all orders for this visit:    Type 2 diabetes mellitus with stage 3 chronic kidney disease, unspecified long term insulin use status (HCC)  -     POCT glycosylated hemoglobin (Hb A1C)    Need for prophylactic vaccination against diphtheria-tetanus-pertussis (DTP)    Need for prophylactic vaccination and inoculation against varicella  -     zoster recombinant adjuvanted vaccine (SHINGRIX) 50 MCG SUSR injection; 50 MCG IM then repeat 2-6 months. CKD (chronic kidney disease) stage 3, GFR 30-59 ml/min    Pure hypercholesterolemia    Parkinson's disease ([facility]    Other orders  -     Cancel: Tdap (ADACEL) 5-2-15.5 LF-MCG/0.5 injection; Inject 0.5 mLs into the muscle once for 1 dose  -     losartan (COZAAR) 50 MG tablet; Take 1 tablet by mouth daily.  -     gabapentin (NEURONTIN) 100 MG capsule; Take 1 capsule by mouth daily. .  -     carbidopa-levodopa (SINEMET)  MG per tablet; TAKE 1 TABLET BY MOUTH 3 TIMES A DAY  -     atorvastatin (LIPITOR) 40 MG tablet; Take 1 and 1/2 tablets by mouth every evening  -     montelukast (SINGULAIR) 10 MG tablet; Take 1 tablet by mouth daily. -     celecoxib (CELEBREX) 200 MG capsule; Take 1 capsule by mouth daily.  -     DULoxetine (CYMBALTA) 60 MG extended release capsule; Take 1 capsule by mouth daily  -     albuterol sulfate HFA (VENTOLIN HFA) 108 (90 Base) MCG/ACT inhaler;  Inhale 2 puffs into the lungs every 6 hours as needed for Wheezing    The current medical regimen is effective;  continue present plan and medications. Improving with PT. Instructions:    See patient goals. I have reviewed my findings and recommendations with Sosa Casas. Victoria Kelley MD    On the basis of positive falls risk screening, assessment and plan is as follows: home safety tips provided, use walker for ambulation.

## 2018-07-31 ENCOUNTER — HOSPITAL ENCOUNTER (OUTPATIENT)
Dept: OCCUPATIONAL THERAPY | Age: 78
Setting detail: THERAPIES SERIES
Discharge: HOME | End: 2018-07-31
Payer: MEDICARE

## 2018-07-31 ENCOUNTER — HOSPITAL ENCOUNTER (OUTPATIENT)
Dept: SPEECH THERAPY | Age: 78
Setting detail: THERAPIES SERIES
Discharge: HOME | End: 2018-07-31
Payer: MEDICARE

## 2018-07-31 PROCEDURE — 97530 THERAPEUTIC ACTIVITIES: CPT

## 2018-07-31 PROCEDURE — 97127 HC SP THER IVNTJ W/FOCUS COG FUNCJ: CPT

## 2018-07-31 PROCEDURE — 92507 TX SP LANG VOICE COMM INDIV: CPT

## 2018-07-31 PROCEDURE — 97110 THERAPEUTIC EXERCISES: CPT

## 2018-07-31 NOTE — FLOWSHEET NOTE
Tolerance:   [x] Patient tolerated treatment well [] Patient limited by fatique  [] Patient limited by pain [] Patient limited by other medical complications  [] Other:     Goals:      Short term goals  Time Frame for Short term goals: 2 weeks  Short term goal 1: Patient will be able to walk to the bathroom with increased performance with increased performance as rated via COPM (currently rated at 6/10). - 9/10 rated on 7/13 with use walker  Short term goal 2: Patient will be able to walk from the car to the store with a shopping cart or walker if needed. - PROGRESSING with driveway walking with walker, and walking into a restaurant with a cane 7/13  Short term goal 3: Patient and caregiver will be I with once daily modified LSVT exercises. - GOAL MET 6/22  Short term goal 4: Patient will be able to wipe off a counter with RUE in standing. - PROGRESSING 7/13, \"I stood this morning for brushing my teeth and washing my face\". Long term goals  Time Frame for Long term goals : 4 weeks  Long term goal 1: Patient will be able to get onto a three wheeled motorcycle with assist from spouse as needed. - HAS NOT ATTEMPTED 7/13  Long term goal 2: Patient and caregiver will be I with one-two times daily modified LSVT exercises. Long term goal 3: Patient will be able to walk to the bathroom with increased performance with increased performance (to 9/10) as rated via COPM (currently rated at 6/10).  -   Patient Goals   Patient goals : \"I want to be able to get onto a motorcycle again\"    Patient Requires Follow-up:  [x]  Yes  []  No    Plan: [x] Continue per plan of care (1-4x/week for 4 weeks) [] Alter current plan (1-4x/week for 4 weeks)   [] Plan of care initiated [] Hold pending MD visit [] Discharge     Plan for Next Session:  Progress plan of care as tolerated, work toward occupational goals for improving independence with daily and meaningful activities    Interactive communication between patient's primary therapist

## 2018-07-31 NOTE — FLOWSHEET NOTE
(e.g. City that starts with \"T\")     Goal 5: NEW GOAL: The pt will speak in conversational speech at 75 dB or greater on average with min cues:  -Targeted indirectly through other therapy tasks     Goal 6: Pt will say individual sentence productions at 75 dB or greater on average with min cues. -GOAL MET    Other treatments:      Assessment:   Improvement noted in the pt's ability to complete higher level deductive reasoning tasks. Plan: Speech treatment x2 week for 4 weeks     Timed Code Treatment:  39 minutes     Total Treatment Time: 62 minutes (7251-6172);  Speech treatment; Cognitive skills development x 3    Signature:    Dina Gibbons  Speech-Language Pathology      [place], [place], [address] [place] [id number]  Speech-Language Pathologist  Phone: [phone]  Fax: [phone]

## 2018-08-03 ENCOUNTER — HOSPITAL ENCOUNTER (OUTPATIENT)
Dept: PHYSICAL THERAPY | Age: 78
Setting detail: THERAPIES SERIES
Discharge: HOME | End: 2018-08-03
Payer: MEDICARE

## 2018-08-03 ENCOUNTER — HOSPITAL ENCOUNTER (OUTPATIENT)
Dept: SPEECH THERAPY | Age: 78
Setting detail: THERAPIES SERIES
Discharge: HOME | End: 2018-08-03
Payer: MEDICARE

## 2018-08-03 PROCEDURE — 97110 THERAPEUTIC EXERCISES: CPT

## 2018-08-03 PROCEDURE — 92507 TX SP LANG VOICE COMM INDIV: CPT

## 2018-08-03 PROCEDURE — 97116 GAIT TRAINING THERAPY: CPT

## 2018-08-03 PROCEDURE — 97127 HC SP THER IVNTJ W/FOCUS COG FUNCJ: CPT

## 2018-08-03 NOTE — FLOWSHEET NOTE
Speech-Language Pathology  Daily Treatment Note    Date:  8/3/2018    Patient Name:  Davina Martinez    :  1940  MRN: [id number]  Restrictions/Precautions:    Diagnosis: Parkinson's Disease  Treatment Diagnosis: Mild-mod cognitive deficits   Insurance/Certification information: Medicare  Referring Physician:  Aquilino Lozano MD    Plan of care signed (Y/N): Faxed    Visit# / total visits:    Medicare Cap (if applicable): As of : $1,102.23 for ST  Pain level: 0/10     G-Code (if applicable):      Date / Visit # G-Code Applied:    Memory Current Status:                           2018  Memory Goal Status:  [address]                              2018      Progress Note: []  Yes  [x]  No  Next due by: 4/10 or 18    Subjective: The pt was her typically pleasant self. She denies any change with cognition at home. She reports using her walker more often at home over the last few days. Objective:   Short term goals:  Goal 1: Pt will recall 4 pieces of new information after 5 minutes with mod cues to encode and no cues to recall. GOAL MET  - 4 story cubes:    -  story cubes:   -  details from an article:      Goal 2: Pt will complete graded attention tasks (divided, alternating, selective) with 90% accuracy min cues. Divided attention task: 100% (25/25) Pt required to complete cancellation task while listening to an article and tallying every time a specific word is read. Pt was able to state the main idea of the article, but was unable to recall specific details. D/C GOAL IF ABLE TO MAINTAIN ACCURACY    Goal 3: Pt will complete graded executive function based tasks with 90% accuracy min cues. -Sequencing 6 step: 50%, the pt required reminders to read through all steps prior to beginning task     Goal 4: Pt will complete graded naming tasks with 90% accuracy min cues.    -Category expansion task: 87% (13/15), more delay in responses was noted as task

## 2018-08-03 NOTE — FLOWSHEET NOTE
Physical Therapy Daily Treatment Note    Date:  8/3/2018    Patient Name:  Cher Comer    :  1940  MRN: [id number]  Restrictions/Precautions:    Medical/Treatment Diagnosis Information:  · Diagnosis: Parkinson's Disease  Insurance/Certification information:  PT Insurance Information: Medicare and [address]  Physician Information:  Referring Practitioner: Grace Wiseman MD  Plan of care signed (Y/N):  N  Visit# / total visits:      G-Code (if applicable):         PT G-Codes  Functional Assessment Tool Used: LEFS  Score: 24/80  Functional Limitation: Mobility: Walking and moving around  Mobility: Walking and Moving Around Current Status (): At least 60 percent but less than 80 percent impaired, limited or restricted  Mobility: Walking and Moving Around Goal Status (): At least 40 percent but less than 60 percent impaired, limited or restricted    Medicare Cap (if applicable):  384 = total amount used, updated 8/3/2018    Time in:  8:00      Timed Treatment: 55 Total Treatment Time:  60  ________________________________________________________________________________________    Pain Level:   denies /10  SUBJECTIVE:  Pt reports that her knee has been doing really well. She has also primarily been using her RW out in the community instead of her transport chair.      OBJECTIVE:     Exercise/Equipment Resistance/Repetitions Other comments          [place] Balance Scale       TUG with RW           Seated LSVT BIG       - Exercise 1 5x HEP     - Exercise 2 5x B HEP     - Exercise 3 5x B standing HEP; max cues for bigness and form as standing was new this date     - Exercise 4 5x B standing  HEP; max cues for bigness and form as standing was new this date     - Exercise 5 5x L seated  5x R seated HEP; max cues for bigness and form as standing was new this date; significant fatigue following L side standing and remained seated for the rest of the standard exercises     - Exercise 6 10x B standing HEP; Small break between the two sides. Increased SOB and tremor due to B UE and LE in motion     - Exercise 7 5x B seated HEP; no breaks needed   Sit<>stand 5x  HEP   Big walking 1x327 feet  1x378   Took one seated rest break due to fatigue                        nustep    HR 101bpm O2 sat 97% post nustep            Other Therapeutic Activities:      Manual Treatments:         Modalities:      Test/Measurements:  See initial eval       ASSESSMENT:  Pt demonstrating improved tolerance to exercise, as evidenced by less SOB and tremor following exercise and less rest breaks. Progressed some of the standard exercises to standing this date with increased fatigue toward the end of the session noted. Continues to increase her overall ambulation distance. Progress as pt tolerates.      Treatment/Activity Tolerance:   [x] Patient tolerated treatment well [x] Patient limited by fatique  [] Patient limited by pain [] Patient limited by other medical complications  [] Other:     Goals:          Long term goals  Time Frame for Long term goals : 4 weeks  Long term goal 1: Pt will be independent or modified independent with HEP  Long term goal 2: Pt will increase O[place]illy Balance Scale to 31/56 or better to indicate significant change and decreased fall risk  Long term goal 3: Pt will demonstrate 4/5 strength grossly throughout B LE  Long term goal 4: Pt will report 0 falls in 4 week time frame  Long term goal 5: Pt will ambulate into clinic with 6VR with assist from  as needed     Plan: [x] Continue per plan of care [] Alter current plan (see comments)   [] Plan of care initiated [] Hold pending MD visit [] Discharge      Plan for Next Session:  LSVT BIG    Re-Certification Due Date:         Signature:  Dmitri Butterfield PT

## 2018-08-06 ENCOUNTER — HOSPITAL ENCOUNTER (OUTPATIENT)
Dept: OCCUPATIONAL THERAPY | Age: 78
Setting detail: THERAPIES SERIES
Discharge: HOME | End: 2018-08-06
Payer: MEDICARE

## 2018-08-06 ENCOUNTER — HOSPITAL ENCOUNTER (OUTPATIENT)
Dept: SPEECH THERAPY | Age: 78
Setting detail: THERAPIES SERIES
Discharge: HOME | End: 2018-08-06
Payer: MEDICARE

## 2018-08-06 PROCEDURE — 97127 HC SP THER IVNTJ W/FOCUS COG FUNCJ: CPT

## 2018-08-06 PROCEDURE — 97530 THERAPEUTIC ACTIVITIES: CPT

## 2018-08-06 PROCEDURE — 97110 THERAPEUTIC EXERCISES: CPT

## 2018-08-06 PROCEDURE — 92507 TX SP LANG VOICE COMM INDIV: CPT

## 2018-08-06 PROCEDURE — 97535 SELF CARE MNGMENT TRAINING: CPT

## 2018-08-06 NOTE — FLOWSHEET NOTE
Occupational Therapy Daily Treatment Note    Date:  8/6/2018    Patient Name: Gefofrey Randolph  YOB: 1940   Medical Diagnosis: Parkinson's Disease  Treatment Diagnosis:Performance deficits / Impairments: Decreased functional mobility , Decreased ADL status, Decreased strength, Decreased cognition, Decreased endurance, Decreased balance  Referring Physician: Referring Practitioner: Bernarda Car MD (6/12/18), [facility], Neurology     Onset Date:  Onset Date: 03/01/18    Time In: 1000   Time Out: 1100  Time Code treatment minutes: 55  Total time: 60    Restrictions/Precautions: Yes, lower extremity knee brace, Restrictions: gets laser treatment for foot to increase circulation for 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, has MCL irritation (needs low load and no provocation of pain during therapy)  Position Activity Restriction  Other position/activity restrictions: has a history of increased tremors when she is fatigued and passing out (spouse reports it is not blood pressure related)     G-Code (if applicable):      Date / Visit # G-Code Applied:  7/19/18  G-Code  OT G-codes  Functional Assessment Tool Used: TUG  Score: 16  Functional Limitation: Self care  Self Care Current Status (): At least 60 percent but less than 80 percent impaired, limited or restricted  Self Care Goal Status (): At least 40 percent but less than 60 percent impaired, limited or restricted  ____________________________________________________________________    Plan of Care signed: Y/N: Codi Amend July 2018    Medicare Cap (if applicable):  1416.99 = total amount used, updated 8/6/2018    Progress Note: []  Yes  [x]  No  Next due by: 8/10/18       Visit #: Carmel Mario plus 11/16    Pain Level:  0/10 RLE knee, up to 4/10 FLACC scale with prolonged ambulation    SUBJECTIVE:  Patient states she had a good weekend.     OBJECTIVE:       Therapeutic Exercise:     Exercise/Equipment  Resistance/Repetitions   Other comments   LSVT BIG Exercise 1, 6x, min cues for breathing and intensity with tremors noted     Exercise 2, 6x min cues for breathing and intensity, leaning onto chair for support       Exercise 3, 6x, in standing for RLE stepping, CGA for balance, min cues for big fingers/big leg, mod cues for breathing throughout       With 4WW in standing, mod cues to push through arms    Exercise 4, 6x, min cues for big leg/big fingers With 4WW in standing, mod cues to push through arms    Exercise 5, 6x, min cues With 4WW in standing, mod cues to push through arms    Exercise 6, 6x, min cues for full range With 4WW in standing, mod cues to push through arms    Exercise 7, 6x,min cues for full range With 4WW in standing, mod cues to push through arms                                   ADL/Therapeutic Activities:       Exercise/Equipment Resistance/Repetitions   Other comments   Toileting and grooming CGA for korey-care, SBA for washing hands 4WW, use of grab bars   BIG Walking with 4WW     333 feet  248 feet  193 feet      Opening/closing doors 2x2 with cues to push through arms/keep arm on walker    BIG Sit to stand 5x, min A without arm rests, easily fatigued        Manual Treatments:       Exercise/Equipment Resistance/Repetitions   Other comments                    Modalities:       Mode/Equipment  Resistance/Repetitions   Other comments                    Home Exercise Program:  Exercises 1-8, 2x/day       ASSESSMENT: Patient requires increased cues and demonstrates increased knee pain with use of 4WW but able to demonstrate appropriate insight and take breaks PRN. Patient and spouse verbalize understanding that 4WW may cause fatigue and pain earlier but would like to continue with 4WW since this is what she has at home and it has a seat built in for increased participation in community walking so that she can take breaks as needed.     Treatment/Activity Tolerance:   [x] Patient tolerated treatment well [] Patient limited by papo  [] Patient limited

## 2018-08-09 ENCOUNTER — HOSPITAL ENCOUNTER (OUTPATIENT)
Dept: SPEECH THERAPY | Age: 78
Setting detail: THERAPIES SERIES
Discharge: HOME | End: 2018-08-09
Payer: MEDICARE

## 2018-08-09 PROCEDURE — 97127 HC SP THER IVNTJ W/FOCUS COG FUNCJ: CPT

## 2018-08-09 PROCEDURE — 92507 TX SP LANG VOICE COMM INDIV: CPT

## 2018-08-09 NOTE — FLOWSHEET NOTE
Speech-Language Pathology  Daily Treatment Note    Date:  2018    Patient Name:  Sondra Rocha    :  1940  MRN: [id number]  Restrictions/Precautions:    Diagnosis: Parkinson's Disease  Treatment Diagnosis: Mild-mod cognitive deficits   Insurance/Certification information: Medicare  Referring Physician:  Ruma Gregorio MD    Plan of care signed (Y/N): Faxed    Visit# / total visits:    Medicare Cap (if applicable): As of 75: $1,521.69 for ST  Pain level: 0/10     G-Code (if applicable):      Date / Visit # G-Code Applied:    Memory Current Status:                           2018  Memory Goal Status:  [address] [place] [address]                              2018      Progress Note: []  Yes  [x]  No  Next due by: 6/10 or 18    Subjective: The pt was accompanied by her  like always. Both were in good spirits. Objective:   Short term goals:    Goal 1- NEW GOAL: Pt will recall 5 pieces of information after 10 minutes with mod cues to encode and no cues to recall:   -Details from an article: 100% (5/5)  - 5 unrelated pictures: 100%(5/5)  GOAL MET    Goal 2: Pt will complete graded attention tasks (divided, alternating, selective) with 90% accuracy min cues:  - Alternating attention i-Pad alban (Brain Split) requiring the pt to alternate between identifying if the symbol seen is the same or different than the previous symbol and selecting the higher number dice: 72% (13/18)  - Divided attention task requiring the pt to complete a route planning task while also listening to a given short story by the therapist for comprehension: 33% (5/15)    Goal 3: Pt will complete graded executive function based tasks with 90% accuracy min cues. - Moderately complex Deductive Reasoning Puzzle: 83% (15/18), min cues    Goal 4: Pt will complete graded naming tasks with 90% accuracy min cues.    - Divergent naming task targeted through Sentrigo game in which the therapist and the patient went

## 2018-08-14 ENCOUNTER — HOSPITAL ENCOUNTER (OUTPATIENT)
Dept: SPEECH THERAPY | Age: 78
Setting detail: THERAPIES SERIES
Discharge: HOME | End: 2018-08-14
Payer: MEDICARE

## 2018-08-14 ENCOUNTER — APPOINTMENT (OUTPATIENT)
Dept: OCCUPATIONAL THERAPY | Age: 78
End: 2018-08-14
Payer: MEDICARE

## 2018-08-14 PROCEDURE — 92507 TX SP LANG VOICE COMM INDIV: CPT

## 2018-08-14 PROCEDURE — 97127 HC SP THER IVNTJ W/FOCUS COG FUNCJ: CPT

## 2018-08-14 NOTE — FLOWSHEET NOTE
75 dB or greater on average with min cues. -GOAL MET    Goal 7: Pt will recall 4 pieces of new information after 5 minutes with mod cues to encode and no cues to recall. GOAL MET    Goal 8:  NEW GOAL: Pt will recall 5 pieces of information after 10 minutes with mod cues to encode and no cues to recall:   - GOAL MET    Other treatments:    Assessment:   The pt continues to make overall steady progress in treatment    Plan: Speech treatment x2 week for 4 weeks     Timed Code Treatment:  30 minutes     Total Treatment Time: 55 minutes (9650-5580); Speech treatment; Cognitive skills development x 2    Signature:     Aaliyah White MA, [id number] [facility]  YF#[id number]  Speech-Language Pathologist  Phone: [phone]  Fax: [phone]

## 2018-08-15 ENCOUNTER — HOSPITAL ENCOUNTER (OUTPATIENT)
Dept: OCCUPATIONAL THERAPY | Age: 78
Setting detail: THERAPIES SERIES
Discharge: HOME | End: 2018-08-15
Payer: MEDICARE

## 2018-08-15 PROCEDURE — G8989 SELF CARE D/C STATUS: HCPCS

## 2018-08-15 PROCEDURE — 97110 THERAPEUTIC EXERCISES: CPT

## 2018-08-15 PROCEDURE — 97530 THERAPEUTIC ACTIVITIES: CPT

## 2018-08-15 PROCEDURE — G8988 SELF CARE GOAL STATUS: HCPCS

## 2018-08-15 NOTE — PROGRESS NOTES
Outpatient Occupational Therapy  Phone: [phone] Fax: [phone]     Occupational Therapy Discharge  Dear Dr. Juan Mcdowell and Dr. Todd Lewis,    The following patient has been discharged from therapy services. Please review the attached summary of the patient's plan of care, and verify that you agree with plan for discharge at this time    Plan of Care/Treatment to date:  [x] Therapeutic Exercise  []  Modalities:  [x] Therapeutic Activity   [] Ultrasound [] Electrical Stimulation   [] Total Motion Release   [] Fluidotherapy [] Kinesiotaping  [x]  Neuromuscular Re-education  [] Iontophoresis [] Coldpack/hotpack   [x]  Instruction in HEP    Other:  []  Manual Therapy     []                []  Aquatic Therapy     []              ? Frequency/Duration:  # Days per week: [x] 1 day # Weeks: [] 1 week [] 5 weeks      [x] 2 days? [] 2 weeks [] 6 weeks     [x] 3 days   [] 3 weeks [] 7 weeks     [x] 4 days   [x] 4 weeks [] 8 weeks    Rehab Potential: [] Excellent [] Good [x] Fair  [] Poor     Thank you for the referral of this patient.  Please sign.     Physician signature_______________________ Date________________  By signing above, therapists plan is approved by physician     Fax to: [facility] [phone]     Electronically signed by:  Azul Beauchamp      Occupational Therapy Progress Note    Date:  8/15/2018    Patient Name: Fady Green  YOB: 1940   Medical Diagnosis: Parkinson's Disease  Treatment Diagnosis:Performance deficits / Impairments: Decreased functional mobility , Decreased ADL status, Decreased strength, Decreased cognition, Decreased endurance, Decreased balance  Referring Physician: Referring Practitioner: Erendira Copeland MD (6/12/18), Nasim Camacho, Neurology     Onset Date:  Onset Date: 03/01/18    Restrictions/Precautions: Yes, lower extremity knee brace, Restrictions: gets laser treatment for foot to increase circulation for 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, has MCL irritation (needs low load and no provocation of pain during therapy)  Position Activity Restriction  Other position/activity restrictions: has a history of increased tremors when she is fatigued and passing out (spouse reports it is not blood pressure related)     G-Code (if applicable):      Date / Visit # G-Code Applied:  8/15/18    OT G-codes  Functional Assessment Tool Used: TUG  Score: 15  Functional Limitation: Self care  Self Care Goal Status (): At least 40 percent but less than 60 percent impaired, limited or restricted  Self Care Discharge Status (): At least 60 percent but less than 80 percent impaired, limited or restricted  ____________________________________________________________________    Plan of Care signed: Y/N: Bruce Trivedi July 2018    Medicare Cap (if applicable):  4242.13 = total amount used, updated 8/15/2018    Progress Note: [x]  Yes  []  No  Next due by: 9/14/18     Visit #: Irvin willingham 12/16    Pain Level:  0/10 RLE knee, up to 4/10 FLACC scale with prolonged ambulation    SUBJECTIVE:  \"I don't really need to be in the wheelchair, I'm in the walker all the time. I'm in a wheelchair today because I'm on the tired side. I'm walking without a walker in the house until I get my wheels for my walker\". OBJECTIVE:     TUG: 15 seconds  Home Exercise Program:  Exercises 1-8, 2x/day, Walking with walker to protect knee pain       ASSESSMENT: Patient has participated in 12 visits over the past 8 weeks at a frequency of 1-4x/week. She has met 4/7 goals, shows adequate meeting of 2/7 goals, and has not attempted 1/7 goals (getting onto a three-wheeled motorcycle). She requires minimal to moderate cueing for LSVT BIG exercises. She demonstrates improved TUG test score, increasingly improved distance walked throughout the clinic each session, less cueing needed for exercises, and ability to perform exercises in standing.   During the past few weeks, patient has improved her ability

## 2018-08-15 NOTE — FLOWSHEET NOTE
3: Patient will be able to walk to the bathroom with increased performance with increased performance (to 9/10) as rated via COPM (currently rated at 6/10). - ADEQUATE at 8-9/10  Patient Goals   Patient goals : \"I want to be able to get onto a motorcycle again\"    Patient Requires Follow-up:  []  Yes  [x]  No    Plan: [] Continue per plan of care (1-4x/week for 4 weeks) [] Alter current plan (1-4x/week for 4 weeks)   [] Plan of care initiated [] Hold pending MD visit [x] Discharge     Plan for Next Session:  Progress plan of care as tolerated, work toward occupational goals for improving independence with daily and meaningful activities    Interactive communication between patient's primary therapist and covering therapist was performed in order to ensure accurate information regarding the patient's current condition, treatment and planned interventions as well as recent or anticipated changes in the plan of care.      Electronically signed by:  Rose Thomas

## 2018-08-16 ENCOUNTER — HOSPITAL ENCOUNTER (OUTPATIENT)
Dept: PHYSICAL THERAPY | Age: 78
Setting detail: THERAPIES SERIES
Discharge: HOME | End: 2018-08-16
Payer: MEDICARE

## 2018-08-16 ENCOUNTER — HOSPITAL ENCOUNTER (OUTPATIENT)
Dept: SPEECH THERAPY | Age: 78
Setting detail: THERAPIES SERIES
Discharge: HOME | End: 2018-08-16
Payer: MEDICARE

## 2018-08-16 PROCEDURE — 97127 HC SP THER IVNTJ W/FOCUS COG FUNCJ: CPT

## 2018-08-16 PROCEDURE — G9168 MEMORY CURRENT STATUS: HCPCS

## 2018-08-16 PROCEDURE — 97530 THERAPEUTIC ACTIVITIES: CPT

## 2018-08-16 PROCEDURE — 92507 TX SP LANG VOICE COMM INDIV: CPT

## 2018-08-16 PROCEDURE — G9169 MEMORY GOAL STATUS: HCPCS

## 2018-08-16 PROCEDURE — 97110 THERAPEUTIC EXERCISES: CPT

## 2018-08-16 NOTE — FLOWSHEET NOTE
Physical Therapy Daily Treatment/Progress Note    Date:  2018    Patient Name:  Davina Martinez    :  1940  MRN: [id number]  Restrictions/Precautions:    Medical/Treatment Diagnosis Information:  · Diagnosis: Parkinson's Disease  Insurance/Certification information:  PT Insurance Information: Medicare and Ran0 Tay Carrera  Physician Information:  Referring Practitioner: Aquilino Lozano MD  Plan of care signed (Y/N):  N  Visit# / total visits:      G-Code (if applicable):         PT G-Codes  Functional Assessment Tool Used: LEFS  Score: 24/80  Functional Limitation: Mobility: Walking and moving around  Mobility: Walking and Moving Around Current Status (): At least 60 percent but less than 80 percent impaired, limited or restricted  Mobility: Walking and Moving Around Goal Status (): At least 40 percent but less than 60 percent impaired, limited or restricted    Medicare Cap (if applicable):  9857 = total amount used, updated 2018    Time in:  9:00      Timed Treatment: 55 Total Treatment Time:  60  ________________________________________________________________________________________    Pain Level:   1/10  SUBJECTIVE:  Pt reports that she is using her w/c very little. OBJECTIVE:     Exercise/Equipment Resistance/Repetitions Other comments          Oreilly Balance Scale    39/56 ( at eval)   TUG with RW           Seated LSVT BIG       - Exercise 1 5x HEP     - Exercise 2 5x B HEP     - Exercise 3 5x B standing HEP; max cues for bigness and form as standing was new this date     - Exercise 4 5x B seated HEP; max cues for bigness and form as standing was new this date     - Exercise 5 5x L seated  5x R seated HEP; max cues for bigness and form as standing was new this date; significant fatigue following L side standing and remained seated for the rest of the standard exercises     - Exercise 6 10x B standing HEP; Small break between the two sides.  Increased SOB and tremor due to B UE and flat contact. No observed propulsion/extension noted B   This visit: Pt demonstrates decreased terminal knee extension B (worse on R than L) but improved uperight posture, improved propulsion (though still decreased) and significantly improved jamaal    Balance  Sitting - Static: Fair;+  Sitting - Dynamic: Fair + (At eval: Fair;-)  Standing - Static: Fair - (At eval: Poor;+)  Standing - Dynamic: Poor+ (At eval: Poor;-)  Tandem Stance R Le  Tandem Stance L Le  Single Leg Stance R Le  Single Leg Stance L Le    Comments:   Oreilly Balance Scale 22/56;   TUG 27 seconds with RW at eval  Note at evaluation: Pt started having severe tremors after each standing balance test of the Oreilly, requirind mod A to sit down, and having to lean into  to breath and calm down. Vitals stable, but pt reported that she felt like she was \"passing out\". This visit: No evidence of increased tremors or \"passing out\". Increased fatigue with prolonged standing, but response when fatigued was significantly safer        ASSESSMENT:  After 8 PT visits including a combination of seated and standing LSVT Big exercises, pt is demonstrating significantly improved tolerance to exercise, as evidenced by less SOB and tremor following exercise and less rest breaks. She has improved her strength, balance, and ambulation. Requesting additional two visits as pt progress was slowed following new onset knee pain.      Treatment/Activity Tolerance:   [x] Patient tolerated treatment well [x] Patient limited by fatique  [] Patient limited by pain [] Patient limited by other medical complications  [] Other:     Goals:          Long term goals  Time Frame for Long term goals : 4 weeks  Long term goal 1: Pt will be independent or modified independent with HEP - met  Long term goal 2: Pt will increase Oreilly Balance Scale to 31/56 or better to indicate significant change and decreased fall risk - met, 39/56    Long term goal 3: Pt will demonstrate 4/5 strength grossly throughout B LE - met, see above  Long term goal 4: Pt will report 0 falls in 4 week time frame - met  Long term goal 5: Pt will ambulate into clinic with 4WW with assist from  as needed - met    Plan: [] Continue per plan of care [x] Alter current plan (see assessment)   [] Plan of care initiated [] Hold pending MD visit [] Discharge      Plan for Next Session:  LSVT BIG    Signature:  Carrie Harris PT  , DPT [id number]          Outpatient Physical Therapy  Phone: [phone] Fax: [phone]     To: Bernarda Car MD    From: Carrie Harris PT     Patient: Geoffrey Randolph      : 1940  Diagnosis:  Parkinson's Disease   Date: 2018  Treatment Diagnosis:      Physical Therapy Certification/Re-Certification Form  Dear Dr. Carey Chau  The following patient has been evaluated for physical therapy services and for therapy to continue, Medicare requires monthly physician review of the treatment plan. Please review the attached evaluation and/or summary of the patient's plan of care, and verify that you agree therapy should continue by signing the attached document and sending it back to our office. Plan of Care/Treatment to date:  [x] Therapeutic Exercise   [] Modalities:  [x] Therapeutic Activity     [] Ultrasound  [] Electrical Stimulation   [x] Gait Training      [] Cervical Traction [] Lumbar Traction  [x] Neuromuscular Re-education  [] Hot/Coldpack [] Iontophoresis    [x] Instruction in HEP      Other:  [] Manual Therapy       []                        [] Aquatic Therapy       []                      ? Frequency/Duration:  # Days per week: [] 1 day # Weeks: [] 1 week [] 5 weeks      [x] 2 days?    [] 2 weeks [] 6 weeks     [] 3 days   [] 3 weeks [] 7 weeks     [] 4 days   [x] 4 weeks [] 8 weeks    Rehab Potential: [] Excellent [x] Good [] Fair  [] Poor       Electronically signed by:  Carrie Harris PT , DPT [id number]    If you have any questions or concerns, please don't

## 2018-08-16 NOTE — PROGRESS NOTES
Outpatient Speech Therapy   Phone: [phone] Fax: [phone]    Speech Therapy Progress Note    Date: 2018        Patient Name:  Palak Sandoval    :  1940  MRN: [id number]  Diagnosis: Parkinson's Disease  Treatment Diagnosis: Mild-mod cognitive deficits   Insurance/Certification information: Medicare  Referring Physician:  Andrea Munoz MD    Plan of care signed (Y/N): Faxed    Visit# / total visits:  3/12  Medicare Cap (if applicable): As of 98: $1,679.85 for ST  Pain level:      0/10       The following patient has been evaluated for therapy services. Please review the attached summary of the patient's plan of care, and verify that you agree with plan for additional therapy services at this time.      Thank you for the referral of this patient. Please sign the attached certification form.      Physician signature_______________________ Date________________  Verneice Console to: [facility] [phone]      G-Code (if applicable):                                             Date / Visit # G-Code Applied:    Memory Current Status:  [place]                         2018  Memory Goal Status:  [facility]                              2018     Time Period for Report:  18 to 18  Cancels/No-shows to date:  None    Plan of Care/Treatment to date:  [x] Speech-Language Evaluation/Treatment    [] Dysphagia Evaluation/Treatment        [] Dysphagia Treatment via Neuromuscular Electrical Stimulation (NMES)   [] Modified Barium Swallowing Study  [] Fiberoptic Endoscopic Evaluation of Swallowing (FEES)    [x] Cognitive-Linguistic Skills Development  [x] Voice evaluation and Treatment      [] Evaluation, modification, and Training of Voice Prosthetic     [] Evaluation for Speech-Generating Augmentative and Alternative Communication Device   [] Therapeutic Services for the use of Speech-Generating Device.    [] Other:     Significant Findings At Last Visit/Comments:

## 2018-08-16 NOTE — PROGRESS NOTES
LR BALANCE SCALE 14-Item Long Form Original Version     Patient Name: Alexis Gay   Date: 8/16/2018    1. SITTING TO STANDING   INSTRUCTIONS: Please stand up. Try not to use your hands for support. (4) able to stand without using hands and stabilize independently   (3) able to stand independently using hands   (2) able to stand using hands after several tries   (1) needs minimal aid to stand or to stabilize   (0) needs moderate or maximal assist to stand   Score: 4 (2 at eval)    2. STANDING UNSUPPORTED   INSTRUCTIONS: Please stand for two minutes without holding. (4) able to stand safely 2 minutes   (3) able to stand 2 minutes with supervision   (2) able to stand 30 seconds unsupported   (1) needs several tries to stand 30 seconds unsupported   (0) unable to stand 30 seconds unassisted If a subject is able to stand 2   minutes unsupported, score full points for sitting unsupported. Proceed to   item #4. Score: 4 (3 at eval)    3. SITTING WITH BACK UNSUPPORTED BUT FEET SUPPORTED   ON FLOOR OR ON A STOOL   INSTRUCTIONS: Please sit with arms folded for 2 minutes. (4) able to sit safely and securely 2 minutes   (3) able to sit 2 minutes under supervision   (2) able to sit 30 seconds   (1) able to sit 10 seconds   (0) unable to sit without support 10 seconds   Score: 4     4. STANDING TO SITTING   INSTRUCTIONS: Please sit down. (4) sits safely with minimal use of hands   (3) controls descent by using hands   (2) uses back of legs against chair to control descent   (1) sits independently but has uncontrolled descent   (0) needs assistance to sit   Score: 4 (1 at eval)    5. TRANSFERS   INSTRUCTIONS: Arrange chairs(s) for a pivot transfer. Ask subject to   transfer one way toward a seat with armrests and one way toward a seat   without armrests. You may use two chairs (one with and one without   armrests) or a bed and a chair.    (4) able to transfer safely with minor use of hands   (3) able to transfer safely definite need of hands   (2) able to transfer with verbal cueing and/or supervision   (1) needs one person to assist   (0) needs two people to assist or supervise to be safe   Score: 4 (1 at eval)    6. STANDING UNSUPPORTED WITH EYES CLOSED   INSTRUCTIONS: Please close your eyes and stand still for 10 seconds. (4) able to stand 10 seconds safely   (3) able to stand 10 seconds with supervision   (2) able to stand 3 seconds   (1) unable to keep eyes closed 3 seconds but stays steady   (0) needs help to keep from falling   Score: 4 (3 at eval)    7. STANDING UNSUPPORTED WITH FEET TOGETHER   INSTRUCTIONS: Place your feet together and stand without holding. (4) able to place feet together independently and stand 1 minute safely   (3) able to place feet together independently and stand for 1 minute with   supervision   (2) able to place feet together independently but unable to hold for 30 seconds   (1) needs help to attain position but able to stand 15 seconds feet together   (0) needs help to attain position and unable to hold for 15 seconds   Score: 4 (3 at eval)    8. REACHING FORWARD WITH OUTSTRETCHED ARM WHILE   STANDING   INSTRUCTIONS: Lift arm to 90 degrees. Stretch out your fingers and reach   forward as far as you can. (Examiner places a ruler at end of fingertips when   arm is at 90 degrees. Fingers should not touch the ruler while reaching   forward. The recorded measure is the distance forward that the finger reaches   while the subject is in the most forward lean position. When possible, ask   subject to use both arms when reaching to avoid rotation of the trunk.). (4) can reach forward confidently >25 cm (10 inches)   (3) can reach forward >12 cm safely (5 inches)   (2) can reach forward >5 cm safely (2 inches)   (1) reaches forward but needs supervision   (0) loses balance while trying/requires external support   Score: 3    9.   OBJECT FROM FLOOR FROM A STANDING POSITION   INSTRUCTIONS: from falling/unable to try   Score: 1 (0 at eval)    13. STANDING UNSUPPORTED ONE FOOT IN FRONT   INSTRUCTIONS: (DEMONSTRATE TO SUBJECT) Place one foot directly in   front of the other. If you feel that you cannot place your foot directly in front,   try to step far enough ahead that the heel of your forward foot is ahead of the   toes of the other foot. (To score 3 points, the length of the step should exceed   the length of the other foot and the width of the stance should approximate the   subject's normal stride width). (4) able to place foot tandem independently and hold 30 seconds   (3) able to place foot ahead of other independently and hold 30 seconds   (2) able to take small step independently and hold 30 seconds   (1) needs help to step but can hold 15 seconds   (0) loses balance while stepping or standing   Score: 2 (0 at eval)    14. STANDING ON ONE LEG   INSTRUCTIONS: Stand on one leg as long as you can without holding. (4) able to lift leg independently and hold >10 seconds   (3) able to lift leg independently and hold 5-10 seconds   (2) able to lift leg independently and hold = or >3 seconds   (1) tries to lift leg unable to hold 3 seconds but remains standing   independently   (0) unable to try or needs assist to prevent fall   Score: 0    Total Score: 39/56 (22/56 at eval)  Max score 56,a person scoring below 45 is considered to be at risk for falling.      Completed by: [place] [id number]

## 2018-08-16 NOTE — PROGRESS NOTES
.    [facility] - Memory      Patient: Jair Crum  : 1940  MRN: [id number]  Date: 2018  Electronically Signed by: Lilibeth Leach MA, CCC-SLP       Note: The following terms are used with this FCM:  · External Memory Aid: calendars, schedules, communication/memory books, pictures, color coding  · Memory Strategies: silent rehearsals, word associations, chunking, mnemonic strategies     []  Level 1 The individual is unable to recall any information, regardless of cueing. []  Level 2 The individual consistently requires maximal verbal cues or uses external aids to recall personal information (e.g., family members, biographical information, physical location, etc.) in structured environments. []  Level 3  The individual usually requires maximum cues to recall or use the external aides for simple routine and personal information (e.g., schedule, names of familiar staff, location of therapy areas, etc.) in structured environments. []  Level 4 The individual occasionally requires minimal cues to recall or use external memory aids for simple routine and personal information in structured environments. The individual requires consistent maximal cues to recall or use memory aids for complex and novel information (e.g., carry out multiple steps activities, accommodate schedule changes, anticipate meal times, etc.), plan and follow through on simple future events (e.g., use calendar to keep appointments, use log books to complete a single assignment/task, etc.) in structured environments. [x]  Level 5 The individual consistently requires minimal cues to recall or use external memory aids for complex and novel information. The individual consistently requires minimal cues to plan and follow through on complex future events (e.g., menu planning and meal preparation, planning a party, etc.).       []  Level 6 The individual is able to recall or use external aids/memory strategies for

## 2018-08-17 ENCOUNTER — HOSPITAL ENCOUNTER (OUTPATIENT)
Dept: PHYSICAL THERAPY | Age: 78
Setting detail: THERAPIES SERIES
Discharge: HOME | End: 2018-08-17
Payer: MEDICARE

## 2018-08-17 PROCEDURE — 97110 THERAPEUTIC EXERCISES: CPT

## 2018-08-17 NOTE — FLOWSHEET NOTE
Physical Therapy Daily Treatment Note    Date:  2018    Patient Name:  Yolanda Cross    :  1940  MRN: [id number]  Restrictions/Precautions:    Medical/Treatment Diagnosis Information:  · Diagnosis: R knee pain   Insurance/Certification information:  PT Insurance Information: Medicare and [facility] Tay Carrera  Physician Information:  Referring Practitioner: Adebayo Harris MD  Plan of care signed (Y/N):  N  Visit# / total visits:  3/8     G-Code (if applicable):         PT G-Codes  Functional Assessment Tool Used: LEFS  Score: 30/80  Functional Limitation: Mobility: Walking and moving around  Mobility: Walking and Moving Around Current Status (): At least 60 percent but less than 80 percent impaired, limited or restricted  Mobility: Walking and Moving Around Goal Status (): At least 40 percent but less than 60 percent impaired, limited or restricted    Medicare Cap (if applicable):  460 = total amount used, updated 2018    Time in:   8:30      Timed Treatment: 30 Total Treatment Time:  30  ________________________________________________________________________________________    Pain Level:    4-5/10  SUBJECTIVE:  Pt reports that her knee has been pretty sore the past day or so (since therapy yesterday). Has been walking a lot with her walker and thinks that is the culprit.      OBJECTIVE:     Exercise/Equipment Resistance/Repetitions Other comments   nustep   Level 4 x6 minutes HR 99 bpm O2 sat 98%   QS in supine  10x5\" B HEP   SAQ 15x 4# R HEP   Bridge 12x HEP   clamshell 15x5\" B HEP   SLR 10x R HEP   Seated triple extension 15x5\" R HEP          Gait training    Cues for knee extension and longer steps; stopped due to fatigue, no knee pain   FSU NV                                                                      Other Therapeutic Activities:      Manual Treatments:         Modalities:      Test/Measurements:  See initial eval       ASSESSMENT:    Pt tolerated exercise well, demonstrating

## 2018-08-20 ENCOUNTER — HOSPITAL ENCOUNTER (OUTPATIENT)
Dept: SPEECH THERAPY | Age: 78
Setting detail: THERAPIES SERIES
Discharge: HOME | End: 2018-08-20
Payer: MEDICARE

## 2018-08-20 ENCOUNTER — HOSPITAL ENCOUNTER (OUTPATIENT)
Dept: PHYSICAL THERAPY | Age: 78
Setting detail: THERAPIES SERIES
Discharge: HOME | End: 2018-08-20
Payer: MEDICARE

## 2018-08-20 PROCEDURE — 92507 TX SP LANG VOICE COMM INDIV: CPT

## 2018-08-20 PROCEDURE — 97110 THERAPEUTIC EXERCISES: CPT

## 2018-08-20 PROCEDURE — 97127 HC SP THER IVNTJ W/FOCUS COG FUNCJ: CPT

## 2018-08-20 NOTE — FLOWSHEET NOTE
Physical Therapy Daily Treatment Note    Date:  2018    Patient Name:  Sondra Rocha    :  1940  MRN: [id number]  Restrictions/Precautions:    Medical/Treatment Diagnosis Information:  · Diagnosis: R knee pain   Insurance/Certification information:  PT Insurance Information: Medicare and [employer] Tay Carrera  Physician Information:  Referring Practitioner: North Quinones MD  Plan of care signed (Y/N):  N  Visit# / total visits:      G-Code (if applicable):         PT G-Codes  Functional Assessment Tool Used: LEFS  Score: 30/80  Functional Limitation: Mobility: Walking and moving around  Mobility: Walking and Moving Around Current Status (): At least 60 percent but less than 80 percent impaired, limited or restricted  Mobility: Walking and Moving Around Goal Status (): At least 40 percent but less than 60 percent impaired, limited or restricted    Medicare Cap (if applicable):  419 = total amount used, updated 2018    Time in:   10:00      Timed Treatment: 30 Total Treatment Time:  30  ________________________________________________________________________________________    Pain Level:    2/10  SUBJECTIVE:  Pt reports that her knee pain has decreased since last visit. Went to [facility] over the weekend, but took the wheelchair due to the excessive walking.      OBJECTIVE:     Exercise/Equipment Resistance/Repetitions Other comments   nustep   Level 4 x6 minutes HR 99 bpm O2 sat 98%   QS in supine  10x5\" B HEP   SAQ 15x 4# R HEP   Bridge 12x HEP   clamshell 15x5\" B HEP   SLR 10x R HEP   Seated triple extension 15x5\" R HEP          Gait training    Cues for knee extension and longer steps; stopped due to fatigue, no knee pain   FSU NV    Sit<>stand   3x4 reps high mat table                                                              Other Therapeutic Activities:      Manual Treatments:         Modalities:      Test/Measurements:  See initial eval       ASSESSMENT:    Pt tolerated exercise well, demonstrating early fatigue but increase in overall repetitions. Pain rated 0/10 at end of session. Progress functional mobility as pt tolerates. Treatment/Activity Tolerance:   [x] Patient tolerated treatment well [] Patient limited by fatique  [] Patient limited by pain [] Patient limited by other medical complications  [] Other:     Goals:          Long term goals  Time Frame for Long term goals : 4 weeks  Long term goal 1: Pt will be independent with HEP  Long term goal 2: Pt will report 50% improvement in frequency and intensity of pain  Long term goal 3: Pt will ambulate community distances without occurrance of knee pain  Long term goal 4: Pt will transfer sit<>stand without report of knee pain  Long term goal 5: Pt will increase B LE strength to grossly 4/5 or better     Plan: [x] Continue per plan of care [] Alter current plan (see comments)   [] Plan of care initiated [] Hold pending MD visit [] Discharge      Plan for Next Session:  Correction of biomechanical dysfunctions as they present on visit. Restore proper motor firing pattern and functional muscle activation as presented on visit. Progress as tolerates.     Re-Certification Due Date:         Signature:  Carrie Harris, PT

## 2018-08-23 ENCOUNTER — HOSPITAL ENCOUNTER (OUTPATIENT)
Dept: SPEECH THERAPY | Age: 78
Setting detail: THERAPIES SERIES
Discharge: HOME | End: 2018-08-23
Payer: MEDICARE

## 2018-08-23 PROCEDURE — 97127 HC SP THER IVNTJ W/FOCUS COG FUNCJ: CPT

## 2018-08-23 PROCEDURE — 92507 TX SP LANG VOICE COMM INDIV: CPT

## 2018-08-23 NOTE — FLOWSHEET NOTE
Speech-Language Pathology  Daily Treatment Note    Date:  2018    Patient Name:  Jamie Richard    :  1940  MRN: [id number]  Restrictions/Precautions:    Diagnosis: Parkinson's Disease  Treatment Diagnosis: Mild-mod cognitive deficits   Insurance/Certification information: Medicare  Referring Physician:  Beverly Ballard MD    Plan of care signed (Y/N): Faxed    Visit# / total visits:    Medicare Cap (if applicable): As of : $1,968.66 for ST  Pain level: 0/10     G-Code (if applicable):      Date / Visit # G-Code Applied:    Memory Current Status:  CQ                         2018  Memory Goal Status:  [address]                              2018      Progress Note: []  Yes  [x]  No  Next due by: 2/10 or 18    Subjective: The pt was was in good spirits, accompanied by. Objective:   Short term goals:  Goal 1: NEW GOAL: The pt will recall 6 pieces of information after 15 minutes, min cues to encode and no cues to recall:  - Procedural memory card game requiring the pt to recall 4 playing cards and the rules for scoring it:   - Procedural memory recall of how to make a paper cup:        Goal 2: Pt will complete graded attention tasks (divided, alternating, selective) with 90% accuracy min cues:  - Divided attention task requiring the pt to listen to a news article for comprehension while also tapping numbers in order 1-81 that were in random order: 100% (7/7)    Goal 3: Pt will complete graded executive function based tasks with 90% accuracy min cues. - High-level problem solving/critical thinking task requiring the pt to determine a pattern of 4 balls from a choice of 6 (with possibility of reoccurring colors) given feedback after each attempt: 100%, with min cues and extra time    Goal 4: Pt will complete graded naming tasks with 90% accuracy min cues.    - Responsive namin% (7/10), min cues  - Convergent namin% (9/10), min cues    Goal 5: NEW GOAL:

## 2018-08-24 ENCOUNTER — HOSPITAL ENCOUNTER (OUTPATIENT)
Dept: PHYSICAL THERAPY | Age: 78
Setting detail: THERAPIES SERIES
Discharge: HOME | End: 2018-08-24
Payer: MEDICARE

## 2018-08-24 PROCEDURE — 97110 THERAPEUTIC EXERCISES: CPT

## 2018-08-24 NOTE — FLOWSHEET NOTE
Long term goal 3: Pt will demonstrate 4/5 strength grossly throughout B LE - met, see above  Long term goal 4: Pt will report 0 falls in 4 week time frame - met  Long term goal 5: Pt will ambulate into clinic with 9UD with assist from  as needed - met    Plan: [] Continue per plan of care [x] Alter current plan (see assessment)   [] Plan of care initiated [] Hold pending MD visit [] Discharge      Plan for Next Session:  LSVT BIG    Signature:  Tasneem Torres, PT  , DPT [id number]

## 2018-08-27 ENCOUNTER — HOSPITAL ENCOUNTER (OUTPATIENT)
Dept: PHYSICAL THERAPY | Age: 78
Setting detail: THERAPIES SERIES
Discharge: HOME | End: 2018-08-27
Payer: MEDICARE

## 2018-08-27 PROCEDURE — G8979 MOBILITY GOAL STATUS: HCPCS

## 2018-08-27 PROCEDURE — 97110 THERAPEUTIC EXERCISES: CPT

## 2018-08-27 PROCEDURE — G8980 MOBILITY D/C STATUS: HCPCS

## 2018-08-27 NOTE — PROGRESS NOTES
Outpatient Physical Therapy  Phone: [phone] Fax: [phone]     To: Ivett Hercules MD     From: Marivel Andre PT     Patient: Kelley Simeon       : 1940  Diagnosis: Parkinson's Disease    Date: 2018  Treatment Diagnosis:      Physical Therapy Progress/Discharge Note    Total Visits to date:   10 Cancels/No-shows to date:  0    Plan of Care/Treatment to date:  [x] Therapeutic Exercise    [] Modalities:  [x] Therapeutic Activity     [] Ultrasound   [] Electrical Stimulation   [x] Gait Training      [] Cervical Traction   [] Lumbar Traction  [x] Neuromuscular Re-education  [] Cold/hotpack  [] Iontophoresis  [x] Instruction in HEP      Other:  [x] Manual Therapy       []                                 [] Aquatic Therapy       []                               ? Significant Findings At Last Visit/Comments:    After 10 PT visits, pt has made excellent progress in regards to strength, activity tolerance, balance, and ambulation. She has had a recent set-back due to increased R groin pain, which has limited her overall amount of exercise. Despite this set back, pt feels comfortable with discharge this date and is independent with the help of her , completing the exercises. Recommend discharge this date.      Progress towards goals:    Pt met all PT goals. Please see attached progress note for details. Frequency/Duration:  # Days per week: [] 1 day # Weeks: [] 1 week [] 4 weeks      [x] 2 days?    [] 2 weeks [x] 5 weeks     [] 3 days   [] 3 weeks [] 6 weeks     Rehab Potential: [] Excellent [x] Good [] Fair  [] Poor     Goal Status:  [x] Achieved [] Partially Achieved  [] Not Achieved     Patient Status: [] Continue per initial plan of Care     [x] Patient now discharged     [] Additional visits requested, Please re-certify for additional visits:      Requested frequency/duration:  X/week for weeks    Electronically signed by:  Marivel Andre PT , DPT [id number]    If you have any questions or concerns, please don't hesitate to call.   Thank you for your referral.    Physician Signature:________________________________Date:__________________  By signing above, therapists plan is approved by physician

## 2018-08-27 NOTE — FLOWSHEET NOTE
standard exercises     - Exercise 6 5x B standing HEP; Small break between the two sides.  Increased SOB and tremor due to B UE and LE in motion     - Exercise 7  held due to groin pain  HEP; no breaks needed   Sit<>stand 5x HEP   Big walking Took one seated rest break due to fatigue                        nustep    HR 101bpm O2 sat 97% post nustep            Other Therapeutic Activities:      Manual Treatments:         Modalities:      Test/Measurements:      Observation/Palpation  Posture: Poor  Observation: Pt arrives to clinic with RW (At eval: Pt arrives to clinic in transport wheelchair; poor muscle tone appreciated throughout body)  Body Mechanics: able to transfer sit<>stand without UE assist on first attempt (At eval: B UE needed to rise sit<>stand with posterior LOB, 3 attempts needed)  Edema: B compression stockings with significant LE edema, denies CHF     AROM RLE (degrees)  RLE AROM: WFL  AROM LLE (degrees)  LLE AROM : WFL     Strength RLE  Strength RLE: Exception  R Hip Flexion: 4/5 (4-/5 at eval)  R Hip Extension: 4/5 (4-/5 at eval)  R Hip ABduction: 4/5 (4-/5 at eval)  R Hip Internal Rotation: 4/5 (4-/5 at eval)  R Hip External Rotation: 4/5 (4-/5 at eval)  R Knee Flexion: 4/5 (4-/5 at eval)  R Knee Extension: 4/5 (4-/5 at eval)  R Ankle Dorsiflexion: 4/5 (4-/5 at eval)  Strength LLE  Strength LLE: Exception  L Hip Flexion: 4/5 (4-/5 at eval)  L Hip Extension: 4/5 (4-/5 at eval)  L Hip ABduction: 4/5 (4-/5 at eval)  L Hip Internal Rotation: 4/5 (4-/5 at eval)  L Hip External Rotation: 4/5 (4-/5 at eval)  L Knee Flexion: 4/5 (4-/5 at eval)  L Knee Extension: 4/5 (4-/5 at eval)  L Ankle Dorsiflexion: 4/5 (4-/5 at eval)  Additional Measures  Special Tests: dysdiadodyskinesia WFL, heel to shin WFL, finger to nose WFL    Ambulation  Surface: carpet  Device: Rolling Walker  Assistance: Stand by assistance (At eval: Contact guard assistance)  Quality of Gait:   At eval: Pt stands with extremely slouched

## 2018-08-28 ENCOUNTER — HOSPITAL ENCOUNTER (OUTPATIENT)
Dept: SPEECH THERAPY | Age: 78
Setting detail: THERAPIES SERIES
Discharge: HOME | End: 2018-08-28
Payer: MEDICARE

## 2018-08-28 PROCEDURE — 92507 TX SP LANG VOICE COMM INDIV: CPT

## 2018-08-28 PROCEDURE — 97127 HC SP THER IVNTJ W/FOCUS COG FUNCJ: CPT

## 2018-08-30 ENCOUNTER — HOSPITAL ENCOUNTER (OUTPATIENT)
Dept: SPEECH THERAPY | Age: 78
Setting detail: THERAPIES SERIES
Discharge: HOME | End: 2018-08-30
Payer: MEDICARE

## 2018-08-30 ENCOUNTER — HOSPITAL ENCOUNTER (OUTPATIENT)
Dept: PHYSICAL THERAPY | Age: 78
Setting detail: THERAPIES SERIES
Discharge: HOME | End: 2018-08-30
Payer: MEDICARE

## 2018-08-30 PROCEDURE — 97110 THERAPEUTIC EXERCISES: CPT

## 2018-08-30 PROCEDURE — 97127 HC SP THER IVNTJ W/FOCUS COG FUNCJ: CPT

## 2018-08-30 PROCEDURE — 92507 TX SP LANG VOICE COMM INDIV: CPT

## 2018-08-30 PROCEDURE — G9170 MEMORY D/C STATUS: HCPCS

## 2018-08-30 PROCEDURE — G9169 MEMORY GOAL STATUS: HCPCS

## 2018-08-30 NOTE — PROGRESS NOTES
signed by:  Tasneem Torres PT , DPT [id number]    If you have any questions or concerns, please don't hesitate to call.   Thank you for your referral.    Physician Signature:________________________________Date:__________________  By signing above, therapists plan is approved by physician

## 2018-08-30 NOTE — FLOWSHEET NOTE
Speech-Language Pathology  Daily Treatment Note    Date:  2018    Patient Name:  Palak Sandoval    :  1940  MRN: [id number]  Restrictions/Precautions:    Diagnosis: Parkinson's Disease  Treatment Diagnosis: Mild-mod cognitive deficits   Insurance/Certification information: Medicare  Referring Physician:  Andrea Munoz MD    Plan of care signed (Y/N): Faxed    Visit# / total visits:    Medicare Cap (if applicable): As of : $2,126.82 for ST  Pain level: 0/10     G-Code (if applicable):      Date / Visit # G-Code Applied:    Memory Goal Status:  [address]                              2018  Memory Discharge Status:                       2018    Progress Note: [x]  Yes  []  No      Subjective: The pt was accompanied by her . Both were in their usually pleasant spirits. Objective:   Short term goals:  Goal 1: NEW GOAL: The pt will recall 7 pieces of information after 20 minutes, min cues to encode and no cues to recall:  - Unrelated pictures:        Goal 2: Pt will complete graded attention tasks (divided, alternating, selective) with 90% accuracy min cues:  - Divided attention task requiring her to listen to a read news article for comprehension while completing a task in which she had to point to various numbers, that were in random order, in order: 83%, min cues    Goal 3: Pt will complete graded executive function based tasks with 90% accuracy min cues. - High level deductive reasonin% (4/5)  - Figural grid: 57% (8/14)    Goal 4: Pt will complete graded naming tasks with 90% accuracy min cues. - High level deductive reasonin%    Goal 5: NEW GOAL: The pt will speak in conversational speech at 75 dB or greater on average with min cues:  - Goal targeted indirectly through other treatment tasks. .    Goal 6: Pt will say individual sentence productions at 75 dB or greater on average with min cues.    -GOAL MET    Goal 7: Pt will recall 4 pieces of new information after 5 minutes with mod cues to encode and no cues to recall. GOAL MET    Goal 8:  NEW GOAL: Pt will recall 5 pieces of information after 10 minutes with mod cues to encode and no cues to recall:   - GOAL MET    Goal 9: NEW GOAL: The pt will recall 6 pieces of information after 15 minutes, min cues to encode and no cues to recall:  - GOAL MET    Other treatments:    Assessment:   The pt has demonstrated significant progress in theapy    Plan: Speech treatment x2 week for 4 weeks     Timed Code Treatment:  42 minutes     Total Treatment Time: 56 minutes (0708-5188); Speech treatment; Cognitive skills development x 3    Signature:     Colleen Davis MA, [id number] [id number]  Speech-Language Pathologist  Phone: [phone]  Fax: [phone]

## 2018-08-30 NOTE — PROGRESS NOTES
.    [facility] - Memory      Patient: Yuni Avila  : 1940  MRN: [id number]  Date: 2018  Electronically Signed by: Ana Ortega MA, CCC-SLP       Note: The following terms are used with this FCM:  · External Memory Aid: calendars, schedules, communication/memory books, pictures, color coding  · Memory Strategies: silent rehearsals, word associations, chunking, mnemonic strategies     []  Level 1 The individual is unable to recall any information, regardless of cueing. []  Level 2 The individual consistently requires maximal verbal cues or uses external aids to recall personal information (e.g., family members, biographical information, physical location, etc.) in structured environments. []  Level 3  The individual usually requires maximum cues to recall or use the external aides for simple routine and personal information (e.g., schedule, names of familiar staff, location of therapy areas, etc.) in structured environments. []  Level 4 The individual occasionally requires minimal cues to recall or use external memory aids for simple routine and personal information in structured environments. The individual requires consistent maximal cues to recall or use memory aids for complex and novel information (e.g., carry out multiple steps activities, accommodate schedule changes, anticipate meal times, etc.), plan and follow through on simple future events (e.g., use calendar to keep appointments, use log books to complete a single assignment/task, etc.) in structured environments. [x]  Level 5 The individual consistently requires minimal cues to recall or use external memory aids for complex and novel information. The individual consistently requires minimal cues to plan and follow through on complex future events (e.g., menu planning and meal preparation, planning a party, etc.).       []  Level 6 The individual is able to recall or use external aids/memory strategies for complex
PARTIALLY MET     Goal 6: Pt will say individual sentence productions at 75 dB or greater on average with min cues. -GOAL MET     Goal 7: Pt will recall 4 pieces of new information after 5 minutes with mod cues to encode and no cues to recall. GOAL MET     Goal 8:  NEW GOAL: Pt will recall 5 pieces of information after 10 minutes with mod cues to encode and no cues to recall:   - GOAL MET     Goal 9: NEW GOAL: The pt will recall 6 pieces of information after 15 minutes, min cues to encode and no cues to recall:  - GOAL MET    Current Frequency/Duration:  # Days per week: [] 1 day # Weeks: [] 1 week [x] 4 weeks      [x] 2 days? [] 2 weeks [] 5 weeks      [] 3 days   [] 3 weeks [] 6 weeks     Rehab Potential: [] Excellent [x] Good [] Fair  [] Poor     Goal Status:  [] Achieved [x] Partially Achieved  [] Not Achieved     Patient Status: [] Continue per initial plan of Care     [x] Patient now discharged     [] Additional visits requested, Please re-certify for additional visits:          Electronically signed by:  JAZZ Neff  Phone: [phone]  Fax: [phone]    If you have any questions or concerns, please don't hesitate to call.   Thank you for your referral.

## 2018-08-30 NOTE — FLOWSHEET NOTE
TTP along medial joint line and over patella tendon R knee  Observation: Pt arrives to clinic in transport wheelchair; poor muscle tone appreciated throughout body  Body Mechanics: sit<>stand with use of B UE, unable to complete SLS either LE due to impaired balance  Edema: B pitting edema 3+ noted, edema present around both knees but unable to appreciate any difference between the two; slight increase in warmth over R knee     AROM RLE (degrees)  RLE AROM: WFL  RLE General AROM: lacking 8 degrees to 115 knee motion  AROM LLE (degrees)  LLE AROM : WFL  LLE General AROM: lacking 5 degrees to 120 knee motion     Strength RLE  Strength RLE: Exception  R Hip Flexion: 4/5 (4-/5 at eval)  R Hip Extension: 4/5 (4-/5 at eval)  R Hip ABduction: 4/5 (4-/5 at eval)  R Hip Internal Rotation: 4/5 (4-/5 at eval)  R Hip External Rotation: 4/5 (4-/5 at eval)  R Knee Flexion: 4/5 (4-/5 at eval)  R Knee Extension: 4/5 (4-/5 at eval)  R Ankle Dorsiflexion: 4/5 (4-/5 at eval)  Strength LLE  Strength LLE: Exception  L Hip Flexion: 4/5 (4-/5 at eval)  L Hip Extension: 4/5 (4-/5 at eval)  L Hip ABduction: 4/5 (4-/5 at eval)  L Hip Internal Rotation: 4/5 (4-/5 at eval)  L Hip External Rotation: 4/5 (4-/5 at eval)  L Knee Flexion: 4/5 (4-/5 at eval)  L Knee Extension: 4/5 (4-/5 at eval)  L Ankle Dorsiflexion: 4/5 (4-/5 at eval)  Additional Measures  Special Tests: (-) varus/valgus stress test R knee; (-) Nathaniel R    Ambulation  Ambulation?: Yes  Ambulation 1  Quality of Gait: Pt ambulating without AD, with B knee flexion (about 10 degrees) and femoral ambulation, foot flat contact, no evidence of imbalance but poor postural translation forward and backward with ambulating   Distance: 40 feet   Balance  Sitting - Static: Fair;+  Sitting - Dynamic: Fair;-  Standing - Static: Fair;-  Standing - Dynamic: Poor       ASSESSMENT:   After 5 PT visits geared towards her knee pain, pt is reporting significant improvement in pain and function.

## 2018-09-07 ENCOUNTER — APPOINTMENT (OUTPATIENT)
Dept: GENERAL RADIOLOGY | Age: 78
DRG: 871 | End: 2018-09-07
Payer: MEDICARE

## 2018-09-07 ENCOUNTER — HOSPITAL ENCOUNTER (INPATIENT)
Age: 78
LOS: 1 days | Discharge: HOME | DRG: 871 | End: 2018-09-09
Attending: EMERGENCY MEDICINE | Admitting: HOSPITALIST
Payer: MEDICARE

## 2018-09-07 DIAGNOSIS — J18.9 PNEUMONIA DUE TO ORGANISM: Primary | ICD-10-CM

## 2018-09-07 DIAGNOSIS — G95.20 CERVICAL SPINAL CORD COMPRESSION (HCC): ICD-10-CM

## 2018-09-07 DIAGNOSIS — K76.0 FATTY LIVER: ICD-10-CM

## 2018-09-07 DIAGNOSIS — R05.9 COUGH: ICD-10-CM

## 2018-09-07 DIAGNOSIS — G47.33 OSA TREATED WITH BIPAP: ICD-10-CM

## 2018-09-07 DIAGNOSIS — J45.41 MODERATE PERSISTENT ASTHMA WITH EXACERBATION: ICD-10-CM

## 2018-09-07 DIAGNOSIS — H54.40 BLIND LEFT EYE: ICD-10-CM

## 2018-09-07 DIAGNOSIS — R09.02 HYPOXIA: ICD-10-CM

## 2018-09-07 LAB
A/G RATIO: 1.1 (ref 1.1–2.2)
ALBUMIN SERPL-MCNC: 3.9 G/DL (ref 3.4–5)
ALP BLD-CCNC: 121 U/L (ref 40–129)
ALT SERPL-CCNC: 10 U/L (ref 10–40)
ANION GAP SERPL CALCULATED.3IONS-SCNC: 14 MMOL/L (ref 3–16)
AST SERPL-CCNC: 30 U/L (ref 15–37)
BASOPHILS ABSOLUTE: 0 K/UL (ref 0–0.2)
BASOPHILS RELATIVE PERCENT: 0.3 %
BILIRUB SERPL-MCNC: 0.4 MG/DL (ref 0–1)
BUN BLDV-MCNC: 23 MG/DL (ref 7–20)
CALCIUM SERPL-MCNC: 9.4 MG/DL (ref 8.3–10.6)
CHLORIDE BLD-SCNC: 97 MMOL/L (ref 99–110)
CO2: 28 MMOL/L (ref 21–32)
CREAT SERPL-MCNC: 0.7 MG/DL (ref 0.6–1.2)
EOSINOPHILS ABSOLUTE: 0.1 K/UL (ref 0–0.6)
EOSINOPHILS RELATIVE PERCENT: 1.2 %
GFR AFRICAN AMERICAN: >60
GFR NON-AFRICAN AMERICAN: >60
GLOBULIN: 3.5 G/DL
GLUCOSE BLD-MCNC: 124 MG/DL (ref 70–99)
HCT VFR BLD CALC: 31.5 % (ref 36–48)
HEMOGLOBIN: 10 G/DL (ref 12–16)
LYMPHOCYTES ABSOLUTE: 2.5 K/UL (ref 1–5.1)
LYMPHOCYTES RELATIVE PERCENT: 19.8 %
MCH RBC QN AUTO: 26 PG (ref 26–34)
MCHC RBC AUTO-ENTMCNC: 31.6 G/DL (ref 31–36)
MCV RBC AUTO: 82 FL (ref 80–100)
MONOCYTES ABSOLUTE: 1 K/UL (ref 0–1.3)
MONOCYTES RELATIVE PERCENT: 8.2 %
NEUTROPHILS ABSOLUTE: 8.9 K/UL (ref 1.7–7.7)
NEUTROPHILS RELATIVE PERCENT: 70.5 %
PDW BLD-RTO: 15.6 % (ref 12.4–15.4)
PLATELET # BLD: 239 K/UL (ref 135–450)
PMV BLD AUTO: 8.1 FL (ref 5–10.5)
POTASSIUM SERPL-SCNC: 4.5 MMOL/L (ref 3.5–5.1)
PRO-BNP: 998 PG/ML (ref 0–449)
RBC # BLD: 3.84 M/UL (ref 4–5.2)
REASON FOR REJECTION: NORMAL
REJECTED TEST: NORMAL
SODIUM BLD-SCNC: 139 MMOL/L (ref 136–145)
TOTAL PROTEIN: 7.4 G/DL (ref 6.4–8.2)
TROPONIN: <0.01 NG/ML
WBC # BLD: 12.6 K/UL (ref 4–11)

## 2018-09-07 PROCEDURE — 85025 COMPLETE CBC W/AUTO DIFF WBC: CPT

## 2018-09-07 PROCEDURE — 80053 COMPREHEN METABOLIC PANEL: CPT

## 2018-09-07 PROCEDURE — 6370000000 HC RX 637 (ALT 250 FOR IP): Performed by: NURSE PRACTITIONER

## 2018-09-07 PROCEDURE — 71046 X-RAY EXAM CHEST 2 VIEWS: CPT

## 2018-09-07 PROCEDURE — 99285 EMERGENCY DEPT VISIT HI MDM: CPT

## 2018-09-07 PROCEDURE — 6360000002 HC RX W HCPCS: Performed by: NURSE PRACTITIONER

## 2018-09-07 PROCEDURE — 83880 ASSAY OF NATRIURETIC PEPTIDE: CPT

## 2018-09-07 PROCEDURE — 93005 ELECTROCARDIOGRAM TRACING: CPT | Performed by: EMERGENCY MEDICINE

## 2018-09-07 PROCEDURE — 94640 AIRWAY INHALATION TREATMENT: CPT

## 2018-09-07 PROCEDURE — 84484 ASSAY OF TROPONIN QUANT: CPT

## 2018-09-07 PROCEDURE — 94664 DEMO&/EVAL PT USE INHALER: CPT

## 2018-09-07 PROCEDURE — 96375 TX/PRO/DX INJ NEW DRUG ADDON: CPT

## 2018-09-07 RX ORDER — IPRATROPIUM BROMIDE AND ALBUTEROL SULFATE 2.5; .5 MG/3ML; MG/3ML
1 SOLUTION RESPIRATORY (INHALATION) ONCE
Status: COMPLETED | OUTPATIENT
Start: 2018-09-07 | End: 2018-09-07

## 2018-09-07 RX ORDER — METHYLPREDNISOLONE SODIUM SUCCINATE 125 MG/2ML
80 INJECTION, POWDER, LYOPHILIZED, FOR SOLUTION INTRAMUSCULAR; INTRAVENOUS ONCE
Status: COMPLETED | OUTPATIENT
Start: 2018-09-07 | End: 2018-09-07

## 2018-09-07 RX ORDER — LEVALBUTEROL 1.25 MG/.5ML
0.63 SOLUTION, CONCENTRATE RESPIRATORY (INHALATION) EVERY 8 HOURS PRN
Status: DISCONTINUED | OUTPATIENT
Start: 2018-09-07 | End: 2018-09-07

## 2018-09-07 RX ADMIN — LEVALBUTEROL HYDROCHLORIDE 0.63 MG: 1.25 SOLUTION, CONCENTRATE RESPIRATORY (INHALATION) at 23:17

## 2018-09-07 RX ADMIN — METHYLPREDNISOLONE SODIUM SUCCINATE 80 MG: 125 INJECTION, POWDER, FOR SOLUTION INTRAMUSCULAR; INTRAVENOUS at 21:26

## 2018-09-07 RX ADMIN — IPRATROPIUM BROMIDE AND ALBUTEROL SULFATE 1 AMPULE: .5; 3 SOLUTION RESPIRATORY (INHALATION) at 21:01

## 2018-09-07 ASSESSMENT — ENCOUNTER SYMPTOMS
ABDOMINAL PAIN: 0
COUGH: 0
DIARRHEA: 0
WHEEZING: 0
SHORTNESS OF BREATH: 1
BACK PAIN: 0
NAUSEA: 0
COLOR CHANGE: 0
CHEST TIGHTNESS: 1
VOMITING: 0
SORE THROAT: 0

## 2018-09-07 ASSESSMENT — PAIN DESCRIPTION - PAIN TYPE: TYPE: ACUTE PAIN

## 2018-09-07 ASSESSMENT — PAIN SCALES - GENERAL: PAINLEVEL_OUTOF10: 4

## 2018-09-07 ASSESSMENT — PAIN DESCRIPTION - LOCATION: LOCATION: CHEST

## 2018-09-08 PROBLEM — A41.9 SEPSIS (HCC): Status: ACTIVE | Noted: 2018-09-08

## 2018-09-08 PROBLEM — R79.89 ELEVATED BRAIN NATRIURETIC PEPTIDE (BNP) LEVEL: Status: ACTIVE | Noted: 2018-09-08

## 2018-09-08 PROBLEM — J18.9 COMMUNITY ACQUIRED PNEUMONIA OF LEFT LOWER LOBE OF LUNG: Status: ACTIVE | Noted: 2018-09-08

## 2018-09-08 PROBLEM — N30.00 ACUTE CYSTITIS WITHOUT HEMATURIA: Status: RESOLVED | Noted: 2017-02-27 | Resolved: 2018-09-08

## 2018-09-08 PROBLEM — R79.89 ABNORMAL BUN-TO-CREATININE RATIO: Chronic | Status: ACTIVE | Noted: 2018-09-08

## 2018-09-08 PROBLEM — E66.01 MORBID OBESITY WITH BMI OF 40.0-44.9, ADULT (HCC): Chronic | Status: ACTIVE | Noted: 2017-02-10

## 2018-09-08 PROBLEM — R55 SYNCOPE AND COLLAPSE: Status: RESOLVED | Noted: 2017-02-27 | Resolved: 2018-09-08

## 2018-09-08 PROBLEM — G20.A1 PARKINSON'S DISEASE: Chronic | Status: ACTIVE | Noted: 2018-05-22

## 2018-09-08 PROBLEM — R79.89 ABNORMAL BUN-TO-CREATININE RATIO: Status: ACTIVE | Noted: 2018-09-08

## 2018-09-08 PROBLEM — J96.01 ACUTE HYPOXEMIC RESPIRATORY FAILURE (HCC): Status: ACTIVE | Noted: 2018-09-08

## 2018-09-08 PROBLEM — N18.30 TYPE 2 DIABETES MELLITUS WITH STAGE 3 CHRONIC KIDNEY DISEASE, WITH LONG-TERM CURRENT USE OF INSULIN (HCC): Status: RESOLVED | Noted: 2017-02-10 | Resolved: 2018-09-08

## 2018-09-08 PROBLEM — R07.89 CHEST TIGHTNESS: Status: ACTIVE | Noted: 2018-09-08

## 2018-09-08 PROBLEM — E66.01 OBESITY, CLASS III, BMI 40-49.9 (MORBID OBESITY) (HCC): Status: RESOLVED | Noted: 2017-08-09 | Resolved: 2018-09-08

## 2018-09-08 PROBLEM — G20 PARKINSON'S DISEASE (HCC): Chronic | Status: ACTIVE | Noted: 2018-05-22

## 2018-09-08 PROBLEM — I50.32 CHRONIC DIASTOLIC CHF (CONGESTIVE HEART FAILURE) (HCC): Chronic | Status: ACTIVE | Noted: 2018-09-08

## 2018-09-08 PROBLEM — G47.33 OSA (OBSTRUCTIVE SLEEP APNEA): Status: RESOLVED | Noted: 2017-08-09 | Resolved: 2018-09-08

## 2018-09-08 PROBLEM — L89.152 DECUBITUS ULCER OF SACRAL REGION, STAGE 2 (HCC): Chronic | Status: ACTIVE | Noted: 2018-04-13

## 2018-09-08 PROBLEM — Z79.4 TYPE 2 DIABETES MELLITUS WITH STAGE 3 CHRONIC KIDNEY DISEASE, WITH LONG-TERM CURRENT USE OF INSULIN (HCC): Status: RESOLVED | Noted: 2017-02-10 | Resolved: 2018-09-08

## 2018-09-08 PROBLEM — E11.22 TYPE 2 DIABETES MELLITUS WITH STAGE 3 CHRONIC KIDNEY DISEASE, WITH LONG-TERM CURRENT USE OF INSULIN (HCC): Status: RESOLVED | Noted: 2017-02-10 | Resolved: 2018-09-08

## 2018-09-08 PROBLEM — I95.1 ORTHOSTATIC HYPOTENSION: Status: RESOLVED | Noted: 2017-02-27 | Resolved: 2018-09-08

## 2018-09-08 LAB
ALBUMIN SERPL-MCNC: 3.8 G/DL (ref 3.4–5)
ANION GAP SERPL CALCULATED.3IONS-SCNC: 14 MMOL/L (ref 3–16)
APTT: 30.3 SEC (ref 26–36)
BACTERIA: ABNORMAL /HPF
BASOPHILS ABSOLUTE: 0 K/UL (ref 0–0.2)
BASOPHILS RELATIVE PERCENT: 0.2 %
BILIRUBIN URINE: NEGATIVE
BLOOD, URINE: NEGATIVE
BUN BLDV-MCNC: 23 MG/DL (ref 7–20)
CALCIUM SERPL-MCNC: 9.3 MG/DL (ref 8.3–10.6)
CHLORIDE BLD-SCNC: 98 MMOL/L (ref 99–110)
CLARITY: CLEAR
CO2: 26 MMOL/L (ref 21–32)
COLOR: YELLOW
CREAT SERPL-MCNC: 0.7 MG/DL (ref 0.6–1.2)
EOSINOPHILS ABSOLUTE: 0 K/UL (ref 0–0.6)
EOSINOPHILS RELATIVE PERCENT: 0 %
EPITHELIAL CELLS, UA: ABNORMAL /HPF
ESTIMATED AVERAGE GLUCOSE: 142.7 MG/DL
GFR AFRICAN AMERICAN: >60
GFR NON-AFRICAN AMERICAN: >60
GLUCOSE BLD-MCNC: 161 MG/DL (ref 70–99)
GLUCOSE BLD-MCNC: 164 MG/DL (ref 70–99)
GLUCOSE BLD-MCNC: 174 MG/DL (ref 70–99)
GLUCOSE BLD-MCNC: 180 MG/DL (ref 70–99)
GLUCOSE BLD-MCNC: 187 MG/DL (ref 70–99)
GLUCOSE BLD-MCNC: 188 MG/DL (ref 70–99)
GLUCOSE URINE: NEGATIVE MG/DL
HBA1C MFR BLD: 6.6 %
HCT VFR BLD CALC: 33.1 % (ref 36–48)
HEMOGLOBIN: 10.6 G/DL (ref 12–16)
INR BLD: 1.04 (ref 0.86–1.14)
KETONES, URINE: NEGATIVE MG/DL
LACTIC ACID: 1.5 MMOL/L (ref 0.4–2)
LEUKOCYTE ESTERASE, URINE: NEGATIVE
LYMPHOCYTES ABSOLUTE: 0.4 K/UL (ref 1–5.1)
LYMPHOCYTES RELATIVE PERCENT: 3.1 %
MAGNESIUM: 1.8 MG/DL (ref 1.8–2.4)
MCH RBC QN AUTO: 26.4 PG (ref 26–34)
MCHC RBC AUTO-ENTMCNC: 32.1 G/DL (ref 31–36)
MCV RBC AUTO: 82.1 FL (ref 80–100)
MICROSCOPIC EXAMINATION: YES
MONOCYTES ABSOLUTE: 0.1 K/UL (ref 0–1.3)
MONOCYTES RELATIVE PERCENT: 0.6 %
NEUTROPHILS ABSOLUTE: 11.1 K/UL (ref 1.7–7.7)
NEUTROPHILS RELATIVE PERCENT: 96.1 %
NITRITE, URINE: NEGATIVE
PDW BLD-RTO: 15.7 % (ref 12.4–15.4)
PERFORMED ON: ABNORMAL
PH UA: 7
PHOSPHORUS: 3.5 MG/DL (ref 2.5–4.9)
PLATELET # BLD: 261 K/UL (ref 135–450)
PMV BLD AUTO: 8.6 FL (ref 5–10.5)
POTASSIUM SERPL-SCNC: 3.9 MMOL/L (ref 3.5–5.1)
PRO-BNP: 1017 PG/ML (ref 0–449)
PROCALCITONIN: 0.07 NG/ML (ref 0–0.15)
PROTEIN UA: 100 MG/DL
PROTHROMBIN TIME: 11.9 SEC (ref 9.8–13)
RBC # BLD: 4.03 M/UL (ref 4–5.2)
RBC UA: ABNORMAL /HPF (ref 0–2)
SODIUM BLD-SCNC: 138 MMOL/L (ref 136–145)
SPECIFIC GRAVITY UA: 1.02
TOTAL CK: 66 U/L (ref 26–192)
TROPONIN: <0.01 NG/ML
TROPONIN: <0.01 NG/ML
URINE TYPE: ABNORMAL
UROBILINOGEN, URINE: 0.2 E.U./DL
WBC # BLD: 11.6 K/UL (ref 4–11)
WBC UA: ABNORMAL /HPF (ref 0–5)

## 2018-09-08 PROCEDURE — 97116 GAIT TRAINING THERAPY: CPT

## 2018-09-08 PROCEDURE — 94664 DEMO&/EVAL PT USE INHALER: CPT

## 2018-09-08 PROCEDURE — 81001 URINALYSIS AUTO W/SCOPE: CPT

## 2018-09-08 PROCEDURE — 82550 ASSAY OF CK (CPK): CPT

## 2018-09-08 PROCEDURE — 85730 THROMBOPLASTIN TIME PARTIAL: CPT

## 2018-09-08 PROCEDURE — 97530 THERAPEUTIC ACTIVITIES: CPT

## 2018-09-08 PROCEDURE — 84484 ASSAY OF TROPONIN QUANT: CPT

## 2018-09-08 PROCEDURE — 93010 ELECTROCARDIOGRAM REPORT: CPT | Performed by: INTERNAL MEDICINE

## 2018-09-08 PROCEDURE — 6360000002 HC RX W HCPCS: Performed by: HOSPITALIST

## 2018-09-08 PROCEDURE — 97161 PT EVAL LOW COMPLEX 20 MIN: CPT

## 2018-09-08 PROCEDURE — 36415 COLL VENOUS BLD VENIPUNCTURE: CPT

## 2018-09-08 PROCEDURE — 80069 RENAL FUNCTION PANEL: CPT

## 2018-09-08 PROCEDURE — 87449 NOS EACH ORGANISM AG IA: CPT

## 2018-09-08 PROCEDURE — G8980 MOBILITY D/C STATUS: HCPCS

## 2018-09-08 PROCEDURE — 6370000000 HC RX 637 (ALT 250 FOR IP): Performed by: HOSPITALIST

## 2018-09-08 PROCEDURE — 86738 MYCOPLASMA ANTIBODY: CPT

## 2018-09-08 PROCEDURE — G8988 SELF CARE GOAL STATUS: HCPCS

## 2018-09-08 PROCEDURE — 83880 ASSAY OF NATRIURETIC PEPTIDE: CPT

## 2018-09-08 PROCEDURE — 94660 CPAP INITIATION&MGMT: CPT

## 2018-09-08 PROCEDURE — G8978 MOBILITY CURRENT STATUS: HCPCS

## 2018-09-08 PROCEDURE — 2580000003 HC RX 258

## 2018-09-08 PROCEDURE — 2500000003 HC RX 250 WO HCPCS: Performed by: HOSPITALIST

## 2018-09-08 PROCEDURE — 6360000002 HC RX W HCPCS: Performed by: NURSE PRACTITIONER

## 2018-09-08 PROCEDURE — 85610 PROTHROMBIN TIME: CPT

## 2018-09-08 PROCEDURE — 83036 HEMOGLOBIN GLYCOSYLATED A1C: CPT

## 2018-09-08 PROCEDURE — 97535 SELF CARE MNGMENT TRAINING: CPT

## 2018-09-08 PROCEDURE — 2700000000 HC OXYGEN THERAPY PER DAY

## 2018-09-08 PROCEDURE — 94150 VITAL CAPACITY TEST: CPT

## 2018-09-08 PROCEDURE — 83735 ASSAY OF MAGNESIUM: CPT

## 2018-09-08 PROCEDURE — G8987 SELF CARE CURRENT STATUS: HCPCS

## 2018-09-08 PROCEDURE — 6370000000 HC RX 637 (ALT 250 FOR IP): Performed by: NURSE PRACTITIONER

## 2018-09-08 PROCEDURE — 94640 AIRWAY INHALATION TREATMENT: CPT

## 2018-09-08 PROCEDURE — 84145 PROCALCITONIN (PCT): CPT

## 2018-09-08 PROCEDURE — G8989 SELF CARE D/C STATUS: HCPCS

## 2018-09-08 PROCEDURE — 94761 N-INVAS EAR/PLS OXIMETRY MLT: CPT

## 2018-09-08 PROCEDURE — 85025 COMPLETE CBC W/AUTO DIFF WBC: CPT

## 2018-09-08 PROCEDURE — G8979 MOBILITY GOAL STATUS: HCPCS

## 2018-09-08 PROCEDURE — 2580000003 HC RX 258: Performed by: HOSPITALIST

## 2018-09-08 PROCEDURE — 96365 THER/PROPH/DIAG IV INF INIT: CPT

## 2018-09-08 PROCEDURE — 83605 ASSAY OF LACTIC ACID: CPT

## 2018-09-08 PROCEDURE — 1200000000 HC SEMI PRIVATE

## 2018-09-08 PROCEDURE — 87040 BLOOD CULTURE FOR BACTERIA: CPT

## 2018-09-08 PROCEDURE — 97165 OT EVAL LOW COMPLEX 30 MIN: CPT

## 2018-09-08 PROCEDURE — 2580000003 HC RX 258: Performed by: NURSE PRACTITIONER

## 2018-09-08 RX ORDER — DEXTROSE MONOHYDRATE 25 G/50ML
12.5 INJECTION, SOLUTION INTRAVENOUS PRN
Status: DISCONTINUED | OUTPATIENT
Start: 2018-09-08 | End: 2018-09-09 | Stop reason: HOSPADM

## 2018-09-08 RX ORDER — SODIUM CHLORIDE 0.9 % (FLUSH) 0.9 %
10 SYRINGE (ML) INJECTION EVERY 12 HOURS SCHEDULED
Status: DISCONTINUED | OUTPATIENT
Start: 2018-09-08 | End: 2018-09-09 | Stop reason: HOSPADM

## 2018-09-08 RX ORDER — LABETALOL HYDROCHLORIDE 5 MG/ML
10 INJECTION, SOLUTION INTRAVENOUS EVERY 4 HOURS PRN
Status: DISCONTINUED | OUTPATIENT
Start: 2018-09-08 | End: 2018-09-08

## 2018-09-08 RX ORDER — CLONIDINE HYDROCHLORIDE 0.1 MG/1
0.1 TABLET ORAL EVERY 4 HOURS PRN
Status: DISCONTINUED | OUTPATIENT
Start: 2018-09-08 | End: 2018-09-08

## 2018-09-08 RX ORDER — LOSARTAN POTASSIUM 25 MG/1
50 TABLET ORAL DAILY
Status: DISCONTINUED | OUTPATIENT
Start: 2018-09-08 | End: 2018-09-09 | Stop reason: HOSPADM

## 2018-09-08 RX ORDER — M-VIT,TX,IRON,MINS/CALC/FOLIC 27MG-0.4MG
1 TABLET ORAL DAILY
Status: DISCONTINUED | OUTPATIENT
Start: 2018-09-08 | End: 2018-09-09 | Stop reason: HOSPADM

## 2018-09-08 RX ORDER — POTASSIUM CHLORIDE 20 MEQ/1
40 TABLET, EXTENDED RELEASE ORAL PRN
Status: DISCONTINUED | OUTPATIENT
Start: 2018-09-08 | End: 2018-09-09 | Stop reason: HOSPADM

## 2018-09-08 RX ORDER — MAGNESIUM SULFATE 1 G/100ML
1 INJECTION INTRAVENOUS PRN
Status: DISCONTINUED | OUTPATIENT
Start: 2018-09-08 | End: 2018-09-09 | Stop reason: HOSPADM

## 2018-09-08 RX ORDER — ALPHA LIPOIC ACID 300 MG
300 CAPSULE ORAL DAILY
Status: DISCONTINUED | OUTPATIENT
Start: 2018-09-08 | End: 2018-09-08

## 2018-09-08 RX ORDER — CELECOXIB 100 MG/1
200 CAPSULE ORAL DAILY
Status: DISCONTINUED | OUTPATIENT
Start: 2018-09-08 | End: 2018-09-09 | Stop reason: HOSPADM

## 2018-09-08 RX ORDER — ATORVASTATIN CALCIUM 10 MG/1
20 TABLET, FILM COATED ORAL NIGHTLY
Status: DISCONTINUED | OUTPATIENT
Start: 2018-09-08 | End: 2018-09-09 | Stop reason: HOSPADM

## 2018-09-08 RX ORDER — LAMOTRIGINE 25 MG/1
25 TABLET ORAL 2 TIMES DAILY
Status: DISCONTINUED | OUTPATIENT
Start: 2018-09-08 | End: 2018-09-09 | Stop reason: HOSPADM

## 2018-09-08 RX ORDER — LEVALBUTEROL 1.25 MG/.5ML
0.63 SOLUTION, CONCENTRATE RESPIRATORY (INHALATION) EVERY 4 HOURS
Status: DISCONTINUED | OUTPATIENT
Start: 2018-09-08 | End: 2018-09-09 | Stop reason: HOSPADM

## 2018-09-08 RX ORDER — ONDANSETRON 2 MG/ML
4 INJECTION INTRAMUSCULAR; INTRAVENOUS EVERY 6 HOURS PRN
Status: DISCONTINUED | OUTPATIENT
Start: 2018-09-08 | End: 2018-09-09 | Stop reason: HOSPADM

## 2018-09-08 RX ORDER — CLONIDINE HYDROCHLORIDE 0.1 MG/1
0.2 TABLET ORAL EVERY 4 HOURS PRN
Status: DISCONTINUED | OUTPATIENT
Start: 2018-09-08 | End: 2018-09-09 | Stop reason: HOSPADM

## 2018-09-08 RX ORDER — HYDRALAZINE HYDROCHLORIDE 20 MG/ML
10 INJECTION INTRAMUSCULAR; INTRAVENOUS EVERY 4 HOURS PRN
Status: DISCONTINUED | OUTPATIENT
Start: 2018-09-08 | End: 2018-09-08

## 2018-09-08 RX ORDER — METHYLPREDNISOLONE SODIUM SUCCINATE 40 MG/ML
40 INJECTION, POWDER, LYOPHILIZED, FOR SOLUTION INTRAMUSCULAR; INTRAVENOUS EVERY 12 HOURS
Status: DISCONTINUED | OUTPATIENT
Start: 2018-09-08 | End: 2018-09-08

## 2018-09-08 RX ORDER — NICOTINE POLACRILEX 4 MG
15 LOZENGE BUCCAL PRN
Status: DISCONTINUED | OUTPATIENT
Start: 2018-09-08 | End: 2018-09-09 | Stop reason: HOSPADM

## 2018-09-08 RX ORDER — LEVOFLOXACIN 500 MG/1
500 TABLET, FILM COATED ORAL ONCE
Status: COMPLETED | OUTPATIENT
Start: 2018-09-08 | End: 2018-09-08

## 2018-09-08 RX ORDER — SODIUM CHLORIDE 9 MG/ML
INJECTION, SOLUTION INTRAVENOUS CONTINUOUS
Status: DISCONTINUED | OUTPATIENT
Start: 2018-09-08 | End: 2018-09-08

## 2018-09-08 RX ORDER — HYDRALAZINE HYDROCHLORIDE 20 MG/ML
20 INJECTION INTRAMUSCULAR; INTRAVENOUS EVERY 4 HOURS PRN
Status: DISCONTINUED | OUTPATIENT
Start: 2018-09-08 | End: 2018-09-09 | Stop reason: HOSPADM

## 2018-09-08 RX ORDER — LABETALOL HYDROCHLORIDE 5 MG/ML
20 INJECTION, SOLUTION INTRAVENOUS EVERY 4 HOURS PRN
Status: DISCONTINUED | OUTPATIENT
Start: 2018-09-08 | End: 2018-09-09 | Stop reason: HOSPADM

## 2018-09-08 RX ORDER — SODIUM CHLORIDE 0.9 % (FLUSH) 0.9 %
10 SYRINGE (ML) INJECTION PRN
Status: DISCONTINUED | OUTPATIENT
Start: 2018-09-08 | End: 2018-09-09 | Stop reason: HOSPADM

## 2018-09-08 RX ORDER — 0.9 % SODIUM CHLORIDE 0.9 %
1000 INTRAVENOUS SOLUTION INTRAVENOUS ONCE
Status: COMPLETED | OUTPATIENT
Start: 2018-09-08 | End: 2018-09-08

## 2018-09-08 RX ORDER — DEXTROSE MONOHYDRATE 50 MG/ML
100 INJECTION, SOLUTION INTRAVENOUS PRN
Status: DISCONTINUED | OUTPATIENT
Start: 2018-09-08 | End: 2018-09-09 | Stop reason: HOSPADM

## 2018-09-08 RX ORDER — DULOXETIN HYDROCHLORIDE 60 MG/1
60 CAPSULE, DELAYED RELEASE ORAL DAILY
Status: DISCONTINUED | OUTPATIENT
Start: 2018-09-08 | End: 2018-09-09 | Stop reason: HOSPADM

## 2018-09-08 RX ORDER — ASPIRIN 81 MG/1
81 TABLET ORAL DAILY
Status: DISCONTINUED | OUTPATIENT
Start: 2018-09-08 | End: 2018-09-09 | Stop reason: HOSPADM

## 2018-09-08 RX ORDER — LEVOFLOXACIN 500 MG/1
500 TABLET, FILM COATED ORAL DAILY
Status: DISCONTINUED | OUTPATIENT
Start: 2018-09-09 | End: 2018-09-09 | Stop reason: HOSPADM

## 2018-09-08 RX ORDER — GABAPENTIN 100 MG/1
100 CAPSULE ORAL NIGHTLY
Status: DISCONTINUED | OUTPATIENT
Start: 2018-09-08 | End: 2018-09-09 | Stop reason: HOSPADM

## 2018-09-08 RX ORDER — PREDNISONE 20 MG/1
40 TABLET ORAL DAILY
Status: DISCONTINUED | OUTPATIENT
Start: 2018-09-09 | End: 2018-09-09 | Stop reason: HOSPADM

## 2018-09-08 RX ORDER — LEVALBUTEROL 1.25 MG/.5ML
0.63 SOLUTION, CONCENTRATE RESPIRATORY (INHALATION) EVERY 4 HOURS
Status: DISCONTINUED | OUTPATIENT
Start: 2018-09-08 | End: 2018-09-08

## 2018-09-08 RX ORDER — SODIUM CHLORIDE 9 MG/ML
INJECTION, SOLUTION INTRAVENOUS
Status: COMPLETED
Start: 2018-09-08 | End: 2018-09-08

## 2018-09-08 RX ORDER — POTASSIUM CHLORIDE 20MEQ/15ML
40 LIQUID (ML) ORAL PRN
Status: DISCONTINUED | OUTPATIENT
Start: 2018-09-08 | End: 2018-09-09 | Stop reason: HOSPADM

## 2018-09-08 RX ORDER — LEVALBUTEROL 1.25 MG/.5ML
0.63 SOLUTION, CONCENTRATE RESPIRATORY (INHALATION) EVERY 6 HOURS PRN
Status: DISCONTINUED | OUTPATIENT
Start: 2018-09-08 | End: 2018-09-08

## 2018-09-08 RX ORDER — MONTELUKAST SODIUM 10 MG/1
10 TABLET ORAL NIGHTLY
Status: DISCONTINUED | OUTPATIENT
Start: 2018-09-08 | End: 2018-09-09 | Stop reason: HOSPADM

## 2018-09-08 RX ORDER — LEVOFLOXACIN 5 MG/ML
500 INJECTION, SOLUTION INTRAVENOUS EVERY 24 HOURS
Status: DISCONTINUED | OUTPATIENT
Start: 2018-09-09 | End: 2018-09-08

## 2018-09-08 RX ORDER — FLUTICASONE PROPIONATE 50 MCG
1 SPRAY, SUSPENSION (ML) NASAL DAILY
Status: DISCONTINUED | OUTPATIENT
Start: 2018-09-08 | End: 2018-09-09 | Stop reason: HOSPADM

## 2018-09-08 RX ORDER — POTASSIUM CHLORIDE 7.45 MG/ML
10 INJECTION INTRAVENOUS PRN
Status: DISCONTINUED | OUTPATIENT
Start: 2018-09-08 | End: 2018-09-09 | Stop reason: HOSPADM

## 2018-09-08 RX ORDER — METHYLPREDNISOLONE SODIUM SUCCINATE 125 MG/2ML
125 INJECTION, POWDER, LYOPHILIZED, FOR SOLUTION INTRAMUSCULAR; INTRAVENOUS ONCE
Status: COMPLETED | OUTPATIENT
Start: 2018-09-08 | End: 2018-09-08

## 2018-09-08 RX ORDER — FAMOTIDINE 20 MG/1
20 TABLET, FILM COATED ORAL 2 TIMES DAILY
Status: DISCONTINUED | OUTPATIENT
Start: 2018-09-08 | End: 2018-09-09 | Stop reason: HOSPADM

## 2018-09-08 RX ORDER — ALBUTEROL SULFATE 90 UG/1
2 AEROSOL, METERED RESPIRATORY (INHALATION) EVERY 4 HOURS PRN
Status: CANCELLED | OUTPATIENT
Start: 2018-09-08

## 2018-09-08 RX ORDER — ACETAMINOPHEN 325 MG/1
650 TABLET ORAL EVERY 4 HOURS PRN
Status: DISCONTINUED | OUTPATIENT
Start: 2018-09-08 | End: 2018-09-09 | Stop reason: HOSPADM

## 2018-09-08 RX ADMIN — LAMOTRIGINE 25 MG: 25 TABLET ORAL at 21:09

## 2018-09-08 RX ADMIN — Medication 2 PUFF: at 20:02

## 2018-09-08 RX ADMIN — GABAPENTIN 100 MG: 100 CAPSULE ORAL at 04:37

## 2018-09-08 RX ADMIN — LEVALBUTEROL HYDROCHLORIDE 0.63 MG: 1.25 SOLUTION, CONCENTRATE RESPIRATORY (INHALATION) at 23:32

## 2018-09-08 RX ADMIN — METHYLPREDNISOLONE SODIUM SUCCINATE 125 MG: 125 INJECTION, POWDER, FOR SOLUTION INTRAMUSCULAR; INTRAVENOUS at 06:51

## 2018-09-08 RX ADMIN — Medication 1 TABLET: at 10:06

## 2018-09-08 RX ADMIN — CLONIDINE HYDROCHLORIDE 0.2 MG: 0.1 TABLET ORAL at 06:51

## 2018-09-08 RX ADMIN — LEVALBUTEROL HYDROCHLORIDE 0.63 MG: 1.25 SOLUTION, CONCENTRATE RESPIRATORY (INHALATION) at 15:55

## 2018-09-08 RX ADMIN — SODIUM CHLORIDE: 9 INJECTION, SOLUTION INTRAVENOUS at 09:03

## 2018-09-08 RX ADMIN — SODIUM CHLORIDE: 9 INJECTION, SOLUTION INTRAVENOUS at 09:04

## 2018-09-08 RX ADMIN — FLUTICASONE PROPIONATE 1 SPRAY: 50 SPRAY, METERED NASAL at 11:18

## 2018-09-08 RX ADMIN — NITROGLYCERIN 1 INCH: 20 OINTMENT TOPICAL at 13:57

## 2018-09-08 RX ADMIN — LEVALBUTEROL HYDROCHLORIDE 0.63 MG: 1.25 SOLUTION, CONCENTRATE RESPIRATORY (INHALATION) at 11:43

## 2018-09-08 RX ADMIN — LEVALBUTEROL HYDROCHLORIDE 0.63 MG: 1.25 SOLUTION, CONCENTRATE RESPIRATORY (INHALATION) at 08:26

## 2018-09-08 RX ADMIN — CEFTRIAXONE SODIUM 1 G: 1 INJECTION, POWDER, FOR SOLUTION INTRAMUSCULAR; INTRAVENOUS at 01:20

## 2018-09-08 RX ADMIN — CELECOXIB 200 MG: 100 CAPSULE ORAL at 09:22

## 2018-09-08 RX ADMIN — CARBIDOPA AND LEVODOPA 1 TABLET: 25; 100 TABLET ORAL at 13:57

## 2018-09-08 RX ADMIN — MONTELUKAST SODIUM 10 MG: 10 TABLET, COATED ORAL at 04:37

## 2018-09-08 RX ADMIN — NITROGLYCERIN 1 INCH: 20 OINTMENT TOPICAL at 06:51

## 2018-09-08 RX ADMIN — FAMOTIDINE 20 MG: 20 TABLET ORAL at 09:23

## 2018-09-08 RX ADMIN — SODIUM CHLORIDE, PRESERVATIVE FREE 10 ML: 5 INJECTION INTRAVENOUS at 10:06

## 2018-09-08 RX ADMIN — INSULIN LISPRO 1 UNITS: 100 INJECTION, SOLUTION INTRAVENOUS; SUBCUTANEOUS at 19:01

## 2018-09-08 RX ADMIN — LEVALBUTEROL HYDROCHLORIDE 0.63 MG: 1.25 SOLUTION, CONCENTRATE RESPIRATORY (INHALATION) at 20:02

## 2018-09-08 RX ADMIN — ASPIRIN 81 MG: 81 TABLET ORAL at 09:21

## 2018-09-08 RX ADMIN — LOSARTAN POTASSIUM 50 MG: 25 TABLET, FILM COATED ORAL at 09:21

## 2018-09-08 RX ADMIN — INSULIN LISPRO 1 UNITS: 100 INJECTION, SOLUTION INTRAVENOUS; SUBCUTANEOUS at 13:57

## 2018-09-08 RX ADMIN — ENOXAPARIN SODIUM 40 MG: 40 INJECTION SUBCUTANEOUS at 09:23

## 2018-09-08 RX ADMIN — CLONIDINE HYDROCHLORIDE 0.2 MG: 0.1 TABLET ORAL at 23:53

## 2018-09-08 RX ADMIN — ATORVASTATIN CALCIUM 20 MG: 10 TABLET, FILM COATED ORAL at 21:09

## 2018-09-08 RX ADMIN — Medication 2 PUFF: at 08:27

## 2018-09-08 RX ADMIN — INSULIN LISPRO 1 UNITS: 100 INJECTION, SOLUTION INTRAVENOUS; SUBCUTANEOUS at 04:35

## 2018-09-08 RX ADMIN — LEVOFLOXACIN 500 MG: 500 TABLET, FILM COATED ORAL at 01:20

## 2018-09-08 RX ADMIN — SODIUM CHLORIDE 1000 ML: 9 INJECTION, SOLUTION INTRAVENOUS at 04:31

## 2018-09-08 RX ADMIN — CARBIDOPA AND LEVODOPA 1 TABLET: 25; 100 TABLET ORAL at 04:37

## 2018-09-08 RX ADMIN — CARBIDOPA AND LEVODOPA 1 TABLET: 25; 100 TABLET ORAL at 09:22

## 2018-09-08 RX ADMIN — DULOXETINE HYDROCHLORIDE 60 MG: 60 CAPSULE, DELAYED RELEASE ORAL at 09:21

## 2018-09-08 RX ADMIN — INSULIN LISPRO 1 UNITS: 100 INJECTION, SOLUTION INTRAVENOUS; SUBCUTANEOUS at 09:24

## 2018-09-08 RX ADMIN — NITROGLYCERIN 1 INCH: 20 OINTMENT TOPICAL at 19:01

## 2018-09-08 RX ADMIN — FAMOTIDINE 20 MG: 20 TABLET ORAL at 21:10

## 2018-09-08 RX ADMIN — CARBIDOPA AND LEVODOPA 1 TABLET: 25; 100 TABLET ORAL at 21:09

## 2018-09-08 RX ADMIN — HYDRALAZINE HYDROCHLORIDE 10 MG: 20 INJECTION, SOLUTION INTRAMUSCULAR; INTRAVENOUS at 04:36

## 2018-09-08 RX ADMIN — LABETALOL HYDROCHLORIDE 10 MG: 5 INJECTION, SOLUTION INTRAVENOUS at 03:34

## 2018-09-08 RX ADMIN — LAMOTRIGINE 25 MG: 25 TABLET ORAL at 09:22

## 2018-09-08 ASSESSMENT — PAIN SCALES - GENERAL
PAINLEVEL_OUTOF10: 0
PAINLEVEL_OUTOF10: 4
PAINLEVEL_OUTOF10: 5
PAINLEVEL_OUTOF10: 9
PAINLEVEL_OUTOF10: 0
PAINLEVEL_OUTOF10: 0

## 2018-09-08 ASSESSMENT — PAIN DESCRIPTION - PAIN TYPE
TYPE: ACUTE PAIN
TYPE: ACUTE PAIN

## 2018-09-08 ASSESSMENT — PAIN DESCRIPTION - LOCATION
LOCATION: CHEST
LOCATION: CHEST

## 2018-09-08 NOTE — PROGRESS NOTES
Physical Therapy    Facility/Department: [facility]  Initial Assessment, treatment and Discharge    NAME: Fady Green  : 1940  MRN: [id number]    Date of Service: 2018    Discharge Recommendations:  Home with assist PRN   PT Equipment Recommendations  Equipment Needed: No    Patient Diagnosis(es): The primary encounter diagnosis was Pneumonia due to organism. Diagnoses of Hypoxia, Moderate persistent asthma with exacerbation, Cough, BETSY treated with BiPAP, Fatty liver, Blind left eye, and Cervical spinal cord compression ([address] were also pertinent to this visit. has a past medical history of Anemia, unspecified; Asthma; Baker's cyst; Blind left eye; Carotid artery stenoses; Carotid stenosis; Cervical spinal cord compression ([address] 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-sided sciatica; CKD (chronic kidney disease) stage 3, GFR 30-59 ml/min; Community acquired pneumonia of left lower lobe of lung ([address] CRI; Detached retina, left; Diverticulosis; DJD (degenerative joint disease); Edema; Fatty liver; GERD (gastroesophageal reflux disease); Herniated lumbar intervertebral disc; Hyperlipidemia; Hypertension; Hypothyroid; Morbid obesity with BMI of 40.0-44.9, adult ([address] BETSY treated with BiPAP; Restless leg syndrome; Restless legs syndrome; Sleep apnea; Tremor, essential; and Type II or unspecified type diabetes mellitus without mention of complication, not stated as uncontrolled. has a past surgical history that includes Tonsillectomy; Hysterectomy; Cholecystectomy; Tubal ligation; knee surgery; hernia repair; eye surgery; Cataract removal; vitrectomy; Corneal transplant; Carotid endarterectomy; Appendectomy; cervical fusion (2010); and shoulder surgery.     Restrictions  Restrictions/Precautions  Restrictions/Precautions: General Precautions, Fall Risk (high)  Position Activity Restriction  Other position/activity restrictions: up with assist, IV, tele  Vision/Hearing  Vision: Impaired (blind Yes  Ambulation 1  Surface: level tile  Device: Rolling Walker  Assistance: Modified Independent  Quality of Gait: step through gait pattern, decreased jamaal, steady with turns  Distance: 40'  Comments: distance limited by fatigue  Stairs/Curb  Stairs?: No     Balance  Sitting - Static: Good  Sitting - Dynamic: Good  Standing - Static: Good;-  Standing - Dynamic: Fair;+      Assessment   Assessment: Pt is a 69 y/o female admitted with pneumonia. Pt demonstrated independence with functional mobility and gait with RW. No further skilled PT indicated at this time. Pt is safe to return home with assist from . Prognosis: Good  Decision Making: Low Complexity  Patient Education: Educated on role of PT, use of call light, safety with mobility  No Skilled PT: Independent with functional mobility   REQUIRES PT FOLLOW UP: No  Activity Tolerance  Activity Tolerance: Patient Tolerated treatment well     Plan   Plan  Times per week: 1 tx only  Current Treatment Recommendations: Gait Training  Safety Devices  Type of devices:  All fall risk precautions in place, Call light within reach, Chair alarm in place, Gait belt, Patient at risk for falls, Left in chair, Nurse notified    G-Code  PT G-Codes  Functional Assessment Tool Used: AM-PAC  Score: 20  Functional Limitation: Mobility: Walking and moving around  Mobility: Walking and Moving Around Current Status (): 0 percent impaired, limited or restricted  Mobility: Walking and Moving Around Goal Status (): 0 percent impaired, limited or restricted  Mobility: Walking and Moving Around Discharge Status (): 0 percent impaired, limited or restricted    AM-PAC Score     AM-PAC Inpatient Mobility without Stair Climbing Raw Score : 20  AM-PAC Inpatient without Stair Climbing T-Scale Score : 60.57  Mobility Inpatient CMS 0-100% Score: 0  Mobility Inpatient without Stair CMS G-Code Modifier : CH     Goals  Short term goals  Time Frame for Short term goals: 1 day  Short term goal 1: Pt will ambulate 22' with RW mod I - GOAL MET  Patient Goals   Patient goals : \"to go back home\"     Therapy Time   Individual Concurrent Group Co-treatment   Time In 1502         Time Out 3814         Minutes 33         Timed Code Treatment Minutes: [address], [address] [facility] [id number]

## 2018-09-08 NOTE — CONSULTS
Nutrition Assessment    Type and Reason for Visit: Initial, Consult    Malnutrition Assessment:  · Malnutrition Status: No malnutrition    Nutrition Diagnosis:   · Problem: No nutrition diagnosis at this time    Nutrition Assessment:  · Subjective Assessment: Consult received for BMI 40. Patient is a 70 y.o. female admitted for pneumonia. PMHx of CKD, Diverticulosis, anemia, HLD, HTN, DM, fatty liver. Patient CBW is 256 lb. Currently on carb control diet. Pt seen in room this AM, family at bedside. Patient reports good appetite. No N/V/D. Intentional weight loss since January 2018. Pt reports following carb control diet at times. Encouraged compliance with carb control. Left heathy eating handout and carb counting edu at bedside. Pt and  verbalized understanding and stated they will refresh themselves with guidelines. No questions at this time. Nutrition Risk Level   Risk Level: Low    Nutrition Intervention  Food and/or Delivery: Continue current diet  Nutrition Education/Counseling/Coordination of Care:  Continued Inpatient Monitoring, Education Initiated    Patient assessed for nutrition risk. Deemed to be at low risk at this time. Will continue to follow patient.       Electronically signed by Jones Brink RD, ALYSIA on 9/8/18 at 12:01 PM    Contact Number: [phone]

## 2018-09-08 NOTE — ED NOTES
Pt ambulated 60 ft in hallway on room-air. O2 was 92% and pulse was 120.       [facility]  09/08/18 0117

## 2018-09-08 NOTE — ED NOTES
Nurse to room, explained to patient about medications to be given with expected benefits, patient verbalizes understanding.      Nilda Dee RN  09/07/18 0236

## 2018-09-08 NOTE — ED NOTES
Lavender tube rejected by lab, unable to redraw blood, lab notified, to room to redraw lavender tube     Vince Cruz RN  09/07/18 1290

## 2018-09-08 NOTE — H&P
Hospital Medicine History & Physical      PCP: Eduardo Chu MD    Date of Admission: 9/7/2018    Date of Service: Pt seen/examined on 9/8/18 and Admitted to Inpatient with expected LOS greater than two midnights due to medical therapy. Chief Complaint:  Chief Complaint   Patient presents with    Shortness of Breath     hx of asthma. Took breathing treatment at home and did not improve      History Of Present Illness:    70 y.o. female with a hx of moderate persistent asthma, BETSY on BiPAP, morbid obesity, Parkinson's disease, dCHF, CAD, HTN, HLD, DM2, and other comorbidities, who presents to the [facility] ED, apparently from home, via EMS, due to SOB. In the ED, she was tachypneic to 22, tachycardic to 110, and mildly hypoxemic in the low-90s. CXR showed a LLL PNA. She was noted to be wheezy. She met sepsis criteria. Other labs showed an elevated BUN:Cr ratio and an elevated BNP level. When I saw her on the floor, she was resting in bed in semi-Fowlers. A hospital BiPAP machine was at her bedside, but she was not using it. I asked her about the BiPAP, but she said she wasn't tired and didn't want to wear it if she wasn't going to sleep. I offered her melatonin but she declined. Her HR and BP were still significantly elevated despite having been given labetalol and hydralazine. Her main complaint to me was chest tightness.     Past Medical History:          Diagnosis Date    Anemia, unspecified 2010    neg bone marrow    Asthma     Baker's cyst     Blind left eye     Carotid artery stenoses     Carotid stenosis 2002    left    Cervical spinal cord compression ([address] 11/2010    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-sided sciatica 8/9/2016    CKD (chronic kidney disease) stage 3, GFR 30-59 ml/min     Community acquired pneumonia of left lower lobe of lung ([address] 9/8/2018    CRI     Detached retina, left     Diverticulosis     DJD (degenerative joint disease)     Edema     chronic    Fatty liver lives apparently at home. TOBACCO:   reports that she has never smoked. She has never used smokeless tobacco.  ETOH:   reports that she does not drink alcohol. Family History:      Positive as follows:    Family History   Problem Relation Age of Onset    Diabetes Mother     Heart Disease Mother     Diabetes Father     Heart Disease Father     Diabetes Sister        REVIEW OF SYSTEMS:   Pertinent positives as noted in the HPI. All other systems reviewed and negative. PHYSICAL EXAM PERFORMED:    BP (!) 161/106   Pulse 104   Temp 97.8 °F (36.6 °C) (Oral)   Resp 14   Ht 5' 6\" (1.676 m)   Wt 250 lb (113.4 kg)   SpO2 91%   BMI 40.35 kg/m²     General appearance:  Elderly, morbidly obese WF resting in bed in semi-Fowlers position in NAD. C/o chest tightness. HEENT:  Normal cephalic, atraumatic without obvious deformity. Pupils equal, round, and reactive to light. Extra ocular muscles intact. Conjunctivae/corneas clear. Neck: Supple, with full range of motion. No jugular venous distention. Trachea midline. Respiratory:  Normal respiratory effort. Clear to auscultation, bilaterally without Rales/Wheezes/Rhonchi. BiPAP at bedside not being used. Cardiovascular:  Tachy. Abdomen: Soft, non-tender, non-distended with normal bowel sounds. Musculoskeletal:  No clubbing, cyanosis or edema bilaterally. Full range of motion without deformity. Skin: Skin color, texture, turgor normal.  No rashes or lesions. Neurologic:  RUE tremor. Masked facies. Psychiatric:  Flat affect.   Capillary Refill: Brisk,< 3 seconds   Peripheral Pulses: +2 palpable, equal bilaterally       Labs:     Recent Labs      09/07/18   2145   WBC  12.6*   HGB  10.0*   HCT  31.5*   PLT  239     Recent Labs      09/07/18 2045   NA  139   K  4.5   CL  97*   CO2  28   BUN  23*   CREATININE  0.7   CALCIUM  9.4     Recent Labs      09/07/18 2045   AST  30   ALT  10   BILITOT  0.4   ALKPHOS  121     No results for input(s): INR in the antigens/antibodies. - Levaquin. Asthma Exacerbation.  - SolluMedrol.  - Xoponex nebs. - continue home asthma meds/inhalers. Sepsis. - due to PNA. - blood and sputum cultures. - abx as above. - IVF's. - trend lactic acid. Acute Hypoxemic Respiratory Failure. - mild (low 90's). - due to PNA and asthma exacerbation. - monitor pulse ox.  - supplemental O2. Chest Tightness. - probably due to asthma exacerbation. - trend trops. - monitor on tele. - NG paste. BETSY on BiPAP. - nightly BiPAP ordered. Currently not using. Hypertensive Urgency; Hx HTN. - continue home BP meds.  - NG paste. - PRN labetalol, hydralazine, and clonidine. Sinus Tachycardia. - will use Xopenex instead of DuoNebs. - PRN labetalol.  - monitor on tele. DM2.  - diabetic diet. - Accuchecks and SSI AC&HS. Morbid Obesity.  - encourage weight loss. - dietary consult. Parkinson's Disease.  - continue home Sinemet. Chronic dCHF. - appears compensated. - daily weights. - strict I/O's. DVT Prophylaxis: Lovenox  Diet: DIET CARB CONTROL; Carb Control: 5 carb choices (75 gms)/meal; Cardiac  Code Status: Full Code    PT/OT Eval Status: not ordered    Keely Cheema MD    Thank you Lee Arshad MD for the opportunity to be involved in this patient's care. If you have any questions or concerns please feel free to contact me at [phone].

## 2018-09-08 NOTE — PROGRESS NOTES
Occupational Therapy   Occupational Therapy Initial Assessment, Treatment Note and DC Summary  Date: 2018   Patient Name: Davina Martinez  MRN: [id number]     : 1940    Date of Service: 2018    Discharge Recommendations:  24 hour supervision or assist  OT Equipment Recommendations  Equipment Needed: No    Patient Diagnosis(es): The primary encounter diagnosis was Pneumonia due to organism. Diagnoses of Hypoxia, Moderate persistent asthma with exacerbation, Cough, BETSY treated with BiPAP, Fatty liver, Blind left eye, and Cervical spinal cord compression ([address] were also pertinent to this visit. has a past medical history of Anemia, unspecified; Asthma; Baker's cyst; Blind left eye; Carotid artery stenoses; Carotid stenosis; Cervical spinal cord compression ([address] 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-sided sciatica; CKD (chronic kidney disease) stage 3, GFR 30-59 ml/min; Community acquired pneumonia of left lower lobe of lung ([address] CRI; Detached retina, left; Diverticulosis; DJD (degenerative joint disease); Edema; Fatty liver; GERD (gastroesophageal reflux disease); Herniated lumbar intervertebral disc; Hyperlipidemia; Hypertension; Hypothyroid; Morbid obesity with BMI of 40.0-44.9, adult ([address] BETSY treated with BiPAP; Restless leg syndrome; Restless legs syndrome; Sleep apnea; Tremor, essential; and Type II or unspecified type diabetes mellitus without mention of complication, not stated as uncontrolled. has a past surgical history that includes Tonsillectomy; Hysterectomy; Cholecystectomy; Tubal ligation; knee surgery; hernia repair; eye surgery; Cataract removal; vitrectomy; Corneal transplant; Carotid endarterectomy; Appendectomy; cervical fusion (2010); and shoulder surgery.            Restrictions  Restrictions/Precautions  Restrictions/Precautions: General Precautions, Fall Risk (high)  Position Activity Restriction  Other position/activity restrictions: up with assist, IV, tele    Subjective   General  Chart Reviewed: Yes  Patient assessed for rehabilitation services?: Yes  Family / Caregiver Present: No  Referring Practitioner: Dr. Akbar Powell  Diagnosis: community aquired pneumonia  Subjective  Subjective: Pt supine in bed upon arrival and agreeable for sessio  General Comment  Comments: RN approval prior to session  Pain Assessment  Patient Currently in Pain: Yes  Pain Assessment: 0-10  Pain Level: 4  Pain Type: Acute pain  Pain Location: Chest  Pain Intervention(s): RN notified, Repositioned, Ambulation/Increased activity, Emotional support  Response to Pain Intervention: Patient Satisfied  Oxygen Therapy  SpO2: 93 %  O2 Device: None (Room air)  Social/Functional History  Social/Functional History  Lives With: Spouse  Type of Home: House  Home Layout: One level  Home Access: Level entry  Bathroom Shower/Tub: Walk-in shower  Bathroom Toilet: Handicap height  Bathroom Equipment: Built-in shower seat, Hand-held shower, Grab bars in shower, Grab bars around toilet  Home Equipment: Long-handled shoehorn, Sock aid, Reacher  Receives Help From: Family (spouse home 24/7 to assist prn)  ADL Assistance: Independent  Homemaking Assistance: Independent  Homemaking Responsibilities: No  Ambulation Assistance: Independent (with 3TA)  Transfer Assistance: Independent (with [id number])  Active : No  Occupation: Retired  Additional Comments: pt reports no falls within the last 6 months, pt with parkinson's participating in OP OT/PT parkinson's program at [employer], pt has pole in bedroom with HRs to assist with bed mobility       Objective   Vision:  (pt is blind in L eye)  Vision Exceptions: Wears glasses for reading  Hearing: Within functional limits    Orientation  Overall Orientation Status: Within Functional Limits  Observation/Palpation  Posture: Good  Balance  Sitting Balance: Independent  Standing Balance: Independent  Standing Balance  Time: x1 min  Activity: t/fs, mobility  Sit to stand: Independent  Stand to sit: Independent  Comment: RW  Functional Mobility  Functional - Mobility Device: Rolling Walker  Activity: Other  Assist Level: Independent  Functional Mobility Comments: no LOB or c/o of pain or fatigue  ADL  Feeding: Independent (EOB)  LE Dressing: Moderate assistance (able to doff socks, assist to don socks, pt reporting she uses sock aid and reacher at home and is Independent with all her AE)  Tone RUE  RUE Tone: Normotonic  Tone LUE  LUE Tone: Normotonic  Coordination  Movements Are Fluid And Coordinated: No  Coordination and Movement description: Resting tremors; Left UE;Right UE (d/t hx of Parkinson's, pt reporting tremors are at baseline)     Bed mobility  Supine to Sit: Independent  Scooting: Independent  Comment: HOB flat, bed rail used however pt with super pole at home, pt at baseline function  Transfers  Sit to stand: Independent  Stand to sit: Independent  Transfer Comments: RW     Cognition  Overall Cognitive Status: WFL  Perception  Overall Perceptual Status: WFL     Sensation  Overall Sensation Status: WFL        LUE AROM (degrees)  LUE AROM : WFL  RUE AROM (degrees)  RUE AROM : WFL  LUE Strength  Gross LUE Strength: WFL  L Hand Grasp: 5/5  L Hand Release: 5/5  RUE Strength  Gross RUE Strength: WFL  R Hand Grasp: 5/5  R Hand Release: 5/5     Hand Dominance  Hand Dominance: Right            Assessment   Assessment: OT eval complete, pt presents at baseline, Independent with all ADLs and functional t/fs. Pt reports her  is home and available for 24/7 assist. No further OT required. Prognosis: Good  Decision Making: Low Complexity  Patient Education: Role of OT, safe t/fs, ADLs, DC recommendations  No Skilled OT: Independent with functional mobility; Independent with ADL's;Safe to return home; At baseline function  REQUIRES OT FOLLOW UP: No  Activity Tolerance  Activity Tolerance: Patient Tolerated treatment well  Safety Devices  Safety Devices in place: Yes  Type of

## 2018-09-08 NOTE — ED PROVIDER NOTES
[facility] C4 PCU  eMERGENCY dEPARTMENT eNCOUnter      Pt Name: Portillo Loredo  MRN: [id number]  Anselmo 1940  Date of evaluation: 9/7/2018  Provider: MAHAD Serrano CNP      Chief Complaint:    Chief Complaint   Patient presents with    Shortness of Breath     hx of asthma. Took breathing treatment at home and did not improve        Nursing Notes, Past Medical Hx, Past Surgical Hx, Social Hx, Allergies, and Family Hx were all reviewed and agreed with or any disagreements were addressed in the HPI.    HPI:  (Location, Duration, Timing, Severity, Quality, Assoc Sx, Context, Modifying factors)  This is a  70 y.o. female who presents today with chest tightness and pain in the left lateral rib region. She states that she has history of asthma. She states that she began feeling short of breath around 5pm today, she states that she took inhaler and then [facility] and was still feeling short of breath. She denies history of COPD, has never smoked, only has asthma. She states her shortness of breath gets worse on exertion. She does complain of some chest discomfort rates the pain a 5 out of 10, states is a tight squeezing sensation. She does report having history of asthma but denies being a smoker. She also reports having history of high cholesterol hypertension and carotid stenosis. She denies wearing oxygen at home, denies any fever or chills. Denies any nausea vomiting or diarrhea. States her inhalers at home are not helping. She denies any additional complaints, no additional aggravating or alleviating factors. The patient presents awake, alert      Pin is mildly dyspneic during conversation. ast Medical/Surgical History:      Diagnosis Date    Anemia, unspecified 2010    neg bone marrow    Asthma     Baker's cyst     Blind left eye     Carotid artery stenoses     Carotid stenosis 2002    left    Cervical spinal cord compression ([facility] [address] 11/2010    Chronic midline low back pain with tablet by mouth daily. Qty: 90 tablet, Refills: 1      celecoxib (CELEBREX) 200 MG capsule Take 1 capsule by mouth daily. Qty: 90 capsule, Refills: 1      DULoxetine (CYMBALTA) 60 MG extended release capsule Take 1 capsule by mouth daily  Qty: 90 capsule, Refills: 1      albuterol sulfate HFA (VENTOLIN HFA) 108 (90 Base) MCG/ACT inhaler Inhale 2 puffs into the lungs every 6 hours as needed for Wheezing  Qty: 3 Inhaler, Refills: 1      fluticasone (FLONASE) 50 MCG/ACT nasal spray USE 2 PUFFS IN EACH NOSTRIL DAILY  Qty: 1 Bottle, Refills: 12      salmeterol (SEREVENT) 50 MCG/DOSE diskus inhaler Inhale 1 puff by mouth 2 times a day. Qty: 180 each, Refills: 1      budesonide (PULMICORT FLEXHALER) 180 MCG/ACT AEPB inhaler Inhale 2 puffs into the lungs 2 times daily  Qty: 3 each, Refills: 1      Misc. Devices (FOAM CHAIR CUSHION) MISC Seat cushion skin protection width 22 inches or greater  Diagnosis: Stage 2 sacral decubitus ulcer, L89.152  Duration: 99  Qty: 1 each, Refills: 0    Associated Diagnoses: Decubitus ulcer of sacral region, stage 2      ipratropium-albuterol (DUONEB) 0.5-2.5 (3) MG/3ML SOLN nebulizer solution Inhale 3 mLs into the lungs 4 times daily DX: J45.40  Qty: 120 vial, Refills: 3    Associated Diagnoses: Asthma with acute exacerbation, unspecified asthma severity, unspecified whether persistent      Respiratory Therapy Supplies (NEBULIZER COMPRESSOR) KIT Use Q6 hours as needed with Duoneb.   Qty: 1 kit, Refills: 0    Associated Diagnoses: Asthma with acute exacerbation, unspecified asthma severity, unspecified whether persistent      lamoTRIgine (LAMICTAL) 25 MG tablet Take 25 mg by mouth 2 times daily       glucose blood VI test strips (QUIQUE CONTOUR TEST) strip CHECK GLUCOSE DAILY AS NEEDED  Qty: 100 strip, Refills: 2      Multiple Vitamins-Minerals (THERAPEUTIC MULTIVITAMIN-MINERALS) tablet Take 1 tablet by mouth daily      Alpha-Lipoic Acid 300 MG CAPS Take 300 mg by mouth daily      [facility] [address] [place], [address] [facility]   Phone [phone]   POCT GLUCOSE - Abnormal; Notable for the following:     POC Glucose 180 (*)     All other components within normal limits    Narrative:     Performed at:  [address], [facility]   Phone [phone]   POCT GLUCOSE - Abnormal; Notable for the following:     POC Glucose 187 (*)     All other components within normal limits    Narrative:     Performed at:  [address], [facility]   Phone [phone]   RESPIRATORY CULTURE   STREP PNEUMONIAE ANTIGEN   RESPIRATORY PANEL, FILM ARRAY   MRSA DNA PROBE, NASAL   CULTURE BLOOD #1   CULTURE BLOOD #2   LEGIONELLA ANTIGEN, URINE   TROPONIN    Narrative:     Akila Shetty tel. [phone],  Rejected Test Name/Called to: Katharine HEARD, 09/07/2018 21:03, by Ayla Moreira  Performed at:  [address], [facility]   Phone [phone]    Narrative:     Akila Shetty tel. [phone],  Rejected Test Name/Called to: Katharine HEARD, 09/07/2018 21:03, by Ayla Moreira  Performed at:  [address], [facility]   Phone [phone]   APTT    Narrative:     Performed at:  [address], [facility]   Phone [phone]   PROTIME-INR    Narrative:     Performed at:  [address], [facility]   Phone [phone]   MAGNESIUM    Narrative:     Performed at:  [address], [facility]   Phone [phone]   TROPONIN    Narrative:     Performed at:  [address], [facility]   Phone [phone]   CK    Narrative:     Performed at:  [address] [employer] [facility] [address], [address] [place] [address] [facility]   Phone [phone]   LACTIC ACID, PLASMA    Narrative:     Performed at:  [address] [facility]  [address] [facility], [address] [place] [address]   Phone [phone]   TROPONIN    Narrative:     Performed at:  [address] [facility] [facility]  [address] [facility], [facility]   Phone [phone]   LACTIC ACID, PLASMA   MYCOPLASMA PNEUMONIAE ANTIBODY, IGM   MAGNESIUM   TROPONIN   HEMOGLOBIN A1C   PROCALCITONIN   POCT GLUCOSE   POCT GLUCOSE   POCT GLUCOSE   POCT GLUCOSE        Remainder of labs reviewed and were negative at this time or not returned at the time of this note. RADIOLOGY:   Non-plain film images such as CT, Ultrasound and MRI are read by the radiologist. Claudia MEJIAS APRN - CNP have directly visualized the radiologic plain film image(s) with the below findings:        Interpretation per the Radiologist below, if available at the time of this note:    XR CHEST STANDARD (2 VW)   Final Result   Suspected left basilar airspace disease, atelectasis or pneumonia.              MEDICAL DECISION MAKING / ED COURSE:      PROCEDURES:   Procedures    None    Patient was given:  Medications   aspirin EC tablet 81 mg (81 mg Oral Given 9/8/18 0921)   atorvastatin (LIPITOR) tablet 20 mg (not administered)   beclomethasone (QVAR) 80 MCG/ACT inhaler 2 puff (2 puffs Inhalation Given 9/8/18 0827)   carbidopa-levodopa (SINEMET)  MG per tablet 1 tablet (1 tablet Oral Given 9/8/18 0922)   celecoxib (CELEBREX) capsule 200 mg (200 mg Oral Given 9/8/18 0922)   DULoxetine (CYMBALTA) extended release capsule 60 mg (60 mg Oral Given 9/8/18 0921)   fluticasone (FLONASE) 50 MCG/ACT nasal spray 1 spray (1 spray Each Nare Given 9/8/18 1118)   gabapentin (NEURONTIN) capsule 100 mg (100 mg Oral Given 9/8/18 0437)   losartan (COZAAR) tablet 50 mg (50 mg Oral Given 9/8/18 0921)   montelukast (SINGULAIR) tablet 10 mg (10 mg Oral Given 9/8/18 0437)   therapeutic multivitamin-minerals 1 tablet (1 tablet Oral Given 9/8/18 1006)   olodaterol (STRIVERDI RESPIMAT) inhaler 2 puff (2 puffs Inhalation Not Given 9/8/18 0832)   lamoTRIgine (LAMICTAL) tablet 25 mg (25 mg Oral Given 9/8/18 0922)   levofloxacin (LEVAQUIN) 500 MG/100ML infusion 500 mg (not administered)   sodium chloride flush 0.9 % injection 10 mL (10 mLs Intravenous Given 9/8/18 1006)   sodium chloride flush 0.9 % injection 10 mL (not administered)   magnesium hydroxide (MILK OF MAGNESIA) 400 MG/5ML suspension 30 mL (not administered)   ondansetron (ZOFRAN) injection 4 mg (not administered)   enoxaparin (LOVENOX) injection 40 mg (40 mg Subcutaneous Given 9/8/18 0923)   glucose (GLUTOSE) 40 % oral gel 15 g (not administered)   dextrose 50 % solution 12.5 g (not administered)   glucagon (rDNA) injection 1 mg (not administered)   dextrose 5 % solution (not administered)   0.9 % sodium chloride infusion ( Intravenous New Bag 9/8/18 0903)   magnesium sulfate 1 g in dextrose 5% 100 mL IVPB (not administered)   potassium chloride (KLOR-CON M) extended release tablet 40 mEq (not administered)     Or   potassium chloride 20 MEQ/15ML (10%) oral solution 40 mEq (not administered)     Or   potassium chloride 10 mEq/100 mL IVPB (Peripheral Line) (not administered)   famotidine (PEPCID) tablet 20 mg (20 mg Oral Given 9/8/18 0923)   acetaminophen (TYLENOL) tablet 650 mg (not administered)   insulin lispro (HUMALOG) injection vial 0-6 Units (1 Units Subcutaneous Given 9/8/18 0924)   insulin lispro (HUMALOG) injection vial 0-3 Units (1 Units Subcutaneous Given 9/8/18 0435)   nitroglycerin (NITRO-BID) 2 % ointment 1 inch (1 inch Topical Given 9/8/18 0651)   labetalol (NORMODYNE;TRANDATE) injection 20 mg (not administered)   hydrALAZINE (APRESOLINE) injection 20 mg (not administered)   cloNIDine (CATAPRES) tablet 0.2 mg (0.2 mg Oral Given 9/8/18 0651)   methylPREDNISolone sodium (SOLU-MEDROL) injection 40 we discussed the patient's case at length and he agreed to accept the patient for admission. The patient tolerated their visit well. I evaluated the patient. The physician was available for consultation as needed. The patient and / or the family were informed of the results of any tests, a time was given to answer questions, a plan was proposed and they agreed with plan. therefore, the patient be admitted to the hospital for further evaluation and management of care. CLINICAL IMPRESSION:  1. Pneumonia due to organism    2. Hypoxia    3. Moderate persistent asthma with exacerbation    4. Cough    5. BETSY treated with BiPAP    6. Fatty liver    7. Blind left eye    8.  Cervical spinal cord compression ([facility] [address]        DISPOSITION        PATIENT REFERRED TO:  Chastity Hopper MD  [address] [place] [id number] [phone]            DISCHARGE MEDICATIONS:  Current Discharge Medication List          DISCONTINUED MEDICATIONS:  Current Discharge Medication List                 (Please note the MDM and HPI sections of this note were completed with a voice recognition program.  Efforts were made to edit the dictations but occasionally words are mis-transcribed.)    Electronically signed, MAHAD Huerta CNP,         MAHAD Huerta CNP  09/08/18 0465

## 2018-09-08 NOTE — ED PROVIDER NOTES
I independently performed a history and physical on Alexis Gay. All diagnostic, treatment, and disposition decisions were made by myself in conjunction with the advanced practice provider. For further details of [facility] emergency department encounter, please see Suzie Franklin NP's documentation. Patient presents to the emergency room complaining of shortness of breath. She reports history of asthma. Patient reports increasing shortness of breath and chest tightness. No fevers, chills, or sweats. Physical exam: Gen.: No acute distress. Moderate discomfort. Heart: Regular rhythm. Normal S1-S2. Lungs: Increased work of breathing. Wheezing with rales. EKG: Normal sinus rhythm with a rate of 65. Normal axis. Normal intervals and durations. Incomplete right bundle branch block. Inferior T wave changes noted. These appear to be new compared to previous EKG on 2/28/12        Assessment/plan:  1. Pneumonia due to organism    2. Hypoxia    3. Moderate persistent asthma with exacerbation    4. Cough    5. BETSY treated with BiPAP    6. Fatty liver    7. Blind left eye    8.  Cervical spinal cord compression [facility])            So Moore, DO  09/08/18 Netaakash 399, DO  09/08/18 Natalia 399, DO  09/09/18 9910

## 2018-09-08 NOTE — ED NOTES
Nurse to room, explained antibiotics to patient with expected benefits and reasoning for medication, patient verbalizes understanding. Discussed lab results and imaging results with patient , discussed possible admission, patient states no one in from physician staff to explain about admission.  Nurse explains physician will be in to see patient when available     Pastor Manasa RN  09/08/18 2615

## 2018-09-09 VITALS
HEIGHT: 66 IN | OXYGEN SATURATION: 94 % | WEIGHT: 261 LBS | RESPIRATION RATE: 20 BRPM | DIASTOLIC BLOOD PRESSURE: 75 MMHG | TEMPERATURE: 97.8 F | SYSTOLIC BLOOD PRESSURE: 162 MMHG | BODY MASS INDEX: 41.95 KG/M2 | HEART RATE: 87 BPM

## 2018-09-09 LAB
ALBUMIN SERPL-MCNC: 3.3 G/DL (ref 3.4–5)
ANION GAP SERPL CALCULATED.3IONS-SCNC: 13 MMOL/L (ref 3–16)
BASOPHILS ABSOLUTE: 0 K/UL (ref 0–0.2)
BASOPHILS RELATIVE PERCENT: 0.1 %
BUN BLDV-MCNC: 34 MG/DL (ref 7–20)
CALCIUM SERPL-MCNC: 8.5 MG/DL (ref 8.3–10.6)
CHLORIDE BLD-SCNC: 101 MMOL/L (ref 99–110)
CO2: 25 MMOL/L (ref 21–32)
CREAT SERPL-MCNC: 0.9 MG/DL (ref 0.6–1.2)
EOSINOPHILS ABSOLUTE: 0 K/UL (ref 0–0.6)
EOSINOPHILS RELATIVE PERCENT: 0 %
GFR AFRICAN AMERICAN: >60
GFR NON-AFRICAN AMERICAN: >60
GLUCOSE BLD-MCNC: 127 MG/DL (ref 70–99)
GLUCOSE BLD-MCNC: 135 MG/DL (ref 70–99)
HCT VFR BLD CALC: 28.1 % (ref 36–48)
HEMOGLOBIN: 9 G/DL (ref 12–16)
L. PNEUMOPHILA SEROGP 1 UR AG: NORMAL
LYMPHOCYTES ABSOLUTE: 1.2 K/UL (ref 1–5.1)
LYMPHOCYTES RELATIVE PERCENT: 8.9 %
MAGNESIUM: 2.2 MG/DL (ref 1.8–2.4)
MCH RBC QN AUTO: 26.3 PG (ref 26–34)
MCHC RBC AUTO-ENTMCNC: 31.9 G/DL (ref 31–36)
MCV RBC AUTO: 82.3 FL (ref 80–100)
MONOCYTES ABSOLUTE: 1 K/UL (ref 0–1.3)
MONOCYTES RELATIVE PERCENT: 7.2 %
NEUTROPHILS ABSOLUTE: 11.8 K/UL (ref 1.7–7.7)
NEUTROPHILS RELATIVE PERCENT: 83.8 %
PDW BLD-RTO: 15.6 % (ref 12.4–15.4)
PERFORMED ON: ABNORMAL
PHOSPHORUS: 3.9 MG/DL (ref 2.5–4.9)
PLATELET # BLD: 230 K/UL (ref 135–450)
PMV BLD AUTO: 8.9 FL (ref 5–10.5)
POTASSIUM SERPL-SCNC: 3.9 MMOL/L (ref 3.5–5.1)
PRO-BNP: 1311 PG/ML (ref 0–449)
RBC # BLD: 3.41 M/UL (ref 4–5.2)
SODIUM BLD-SCNC: 139 MMOL/L (ref 136–145)
STREP PNEUMONIAE ANTIGEN, URINE: NORMAL
WBC # BLD: 14 K/UL (ref 4–11)

## 2018-09-09 PROCEDURE — 94761 N-INVAS EAR/PLS OXIMETRY MLT: CPT

## 2018-09-09 PROCEDURE — 36415 COLL VENOUS BLD VENIPUNCTURE: CPT

## 2018-09-09 PROCEDURE — 6360000002 HC RX W HCPCS: Performed by: HOSPITALIST

## 2018-09-09 PROCEDURE — 83735 ASSAY OF MAGNESIUM: CPT

## 2018-09-09 PROCEDURE — 94660 CPAP INITIATION&MGMT: CPT

## 2018-09-09 PROCEDURE — 85025 COMPLETE CBC W/AUTO DIFF WBC: CPT

## 2018-09-09 PROCEDURE — 83880 ASSAY OF NATRIURETIC PEPTIDE: CPT

## 2018-09-09 PROCEDURE — 6370000000 HC RX 637 (ALT 250 FOR IP): Performed by: HOSPITALIST

## 2018-09-09 PROCEDURE — 94664 DEMO&/EVAL PT USE INHALER: CPT

## 2018-09-09 PROCEDURE — 80069 RENAL FUNCTION PANEL: CPT

## 2018-09-09 PROCEDURE — 94640 AIRWAY INHALATION TREATMENT: CPT

## 2018-09-09 PROCEDURE — 6370000000 HC RX 637 (ALT 250 FOR IP): Performed by: INTERNAL MEDICINE

## 2018-09-09 PROCEDURE — 2700000000 HC OXYGEN THERAPY PER DAY

## 2018-09-09 RX ORDER — DOCUSATE SODIUM 100 MG/1
100 CAPSULE, LIQUID FILLED ORAL DAILY
Status: DISCONTINUED | OUTPATIENT
Start: 2018-09-10 | End: 2018-09-09 | Stop reason: HOSPADM

## 2018-09-09 RX ORDER — LEVOFLOXACIN 500 MG/1
500 TABLET, FILM COATED ORAL DAILY
Qty: 5 TABLET | Refills: 0 | Status: SHIPPED | OUTPATIENT
Start: 2018-09-09 | End: 2018-09-09

## 2018-09-09 RX ORDER — LEVOFLOXACIN 500 MG/1
500 TABLET, FILM COATED ORAL DAILY
Qty: 5 TABLET | Refills: 0 | Status: SHIPPED | OUTPATIENT
Start: 2018-09-09 | End: 2018-09-14 | Stop reason: ALTCHOICE

## 2018-09-09 RX ORDER — SENNA AND DOCUSATE SODIUM 50; 8.6 MG/1; MG/1
1 TABLET, FILM COATED ORAL ONCE
Status: DISCONTINUED | OUTPATIENT
Start: 2018-09-09 | End: 2018-09-09 | Stop reason: HOSPADM

## 2018-09-09 RX ORDER — PSEUDOEPHEDRINE HCL 30 MG
100 TABLET ORAL DAILY
Qty: 10 CAPSULE | Refills: 0 | Status: SHIPPED | OUTPATIENT
Start: 2018-09-10 | End: 2018-09-09

## 2018-09-09 RX ORDER — PREDNISONE 20 MG/1
40 TABLET ORAL DAILY
Qty: 6 TABLET | Refills: 0 | Status: SHIPPED | OUTPATIENT
Start: 2018-09-09 | End: 2018-09-12

## 2018-09-09 RX ORDER — PREDNISONE 20 MG/1
40 TABLET ORAL DAILY
Qty: 6 TABLET | Refills: 0 | Status: SHIPPED | OUTPATIENT
Start: 2018-09-09 | End: 2018-09-09

## 2018-09-09 RX ORDER — PSEUDOEPHEDRINE HCL 30 MG
100 TABLET ORAL DAILY
Qty: 10 CAPSULE | Refills: 0 | Status: SHIPPED | OUTPATIENT
Start: 2018-09-10 | End: 2018-09-14 | Stop reason: ALTCHOICE

## 2018-09-09 RX ADMIN — OLODATEROL RESPIMAT INHALATION SPRAY 2 PUFF: 2.5 SPRAY, METERED RESPIRATORY (INHALATION) at 08:16

## 2018-09-09 RX ADMIN — LEVALBUTEROL HYDROCHLORIDE: 1.25 SOLUTION, CONCENTRATE RESPIRATORY (INHALATION) at 08:15

## 2018-09-09 RX ADMIN — Medication 2 PUFF: at 08:16

## 2018-09-09 RX ADMIN — FAMOTIDINE 20 MG: 20 TABLET ORAL at 10:08

## 2018-09-09 RX ADMIN — LOSARTAN POTASSIUM 50 MG: 25 TABLET, FILM COATED ORAL at 10:08

## 2018-09-09 RX ADMIN — LEVOFLOXACIN 500 MG: 500 TABLET, FILM COATED ORAL at 10:08

## 2018-09-09 RX ADMIN — CLONIDINE HYDROCHLORIDE 0.2 MG: 0.1 TABLET ORAL at 05:00

## 2018-09-09 RX ADMIN — CELECOXIB 200 MG: 100 CAPSULE ORAL at 10:08

## 2018-09-09 RX ADMIN — NITROGLYCERIN 1 INCH: 20 OINTMENT TOPICAL at 05:00

## 2018-09-09 RX ADMIN — ENOXAPARIN SODIUM 40 MG: 40 INJECTION SUBCUTANEOUS at 10:09

## 2018-09-09 RX ADMIN — LEVALBUTEROL HYDROCHLORIDE 0.63 MG: 1.25 SOLUTION, CONCENTRATE RESPIRATORY (INHALATION) at 03:35

## 2018-09-09 RX ADMIN — NITROGLYCERIN 1 INCH: 20 OINTMENT TOPICAL at 00:10

## 2018-09-09 RX ADMIN — Medication 1 TABLET: at 10:08

## 2018-09-09 RX ADMIN — CARBIDOPA AND LEVODOPA 1 TABLET: 25; 100 TABLET ORAL at 10:08

## 2018-09-09 RX ADMIN — DULOXETINE HYDROCHLORIDE 60 MG: 60 CAPSULE, DELAYED RELEASE ORAL at 10:08

## 2018-09-09 RX ADMIN — ASPIRIN 81 MG: 81 TABLET ORAL at 10:09

## 2018-09-09 ASSESSMENT — PAIN SCALES - GENERAL
PAINLEVEL_OUTOF10: 6
PAINLEVEL_OUTOF10: 0
PAINLEVEL_OUTOF10: 0

## 2018-09-09 NOTE — PROGRESS NOTES
Vitals:    09/09/18 0734   BP: (!) 162/75   Pulse: 87   Resp: 20   Temp: 97.8 °F (36.6 °C)   SpO2: 94%     Pt resting quietly in bed alert and oriented. Patient used bipap overnight and slept well. Pt had no acute events overnight. Pt denies having pain at this time and denies having further needs. Bed locked in lowest position, call light within reach, bedside table within reach. Will continue to monitor.

## 2018-09-09 NOTE — DISCHARGE SUMMARY
persistent asthma, BETSY on BiPAP, morbid obesity, Parkinson's disease, dCHF, CAD, HTN, HLD, DM2, and other comorbidities, who presents to the [facility] ED, apparently from home, via EMS, due to SOB. In the ED, she was tachypneic to 22, tachycardic to 110, and mildly hypoxemic in the low-90s. CXR showed a LLL PNA. \"      LLL CAP with sepsis, also asthma exacerbation, complicated by acute hypoxic respiratory  - improved quickly with levofloxacin, steroids, and inhaled bronchodilators. Discharged with another 5 days of levofloxacin and 3 days of prednisone. - F/u blood cultures, urine Ags, respiratory culture. - weaned to RA  - she will need repeat CXR in two months with PCP to verify resolution. Pleurisy  - complained of chest pain on admission. Resolved. It was sharp, stabbing, over her L lower chest, and only occurred when she breathed or coughed. Troponin and EKG were not suggestive of ischemia. She has no h/o CAD. She had a negative nuclear stress test last year. We discussed the questionable utility of further cardiac testing at this point and she agreed that it was not necessary. Will need to be re-evaluated if the chest pain recurs. PD  - continue sinemet    BETSY  - BiPAP while sleeping      Physical Exam Performed:     BP (!) 162/75   Pulse 87   Temp 97.8 °F (36.6 °C) (Oral)   Resp 20   Ht 5' 6\" (1.676 m)   Wt 261 lb (118.4 kg)   SpO2 94%   BMI 42.13 kg/m²       General appearance:  No apparent distress, appears stated age and cooperative. HEENT:  Normal cephalic, atraumatic without obvious deformity. R pupil equal, round, and reactive to light. Extra ocular muscles intact. L eye opaque at baseline. Neck: Supple, with full range of motion. No jugular venous distention. Trachea midline. Respiratory:  Normal respiratory effort. Clear to auscultation, bilaterally without Rales/Wheezes/Rhonchi. Crackles LLL.   Cardiovascular:  Regular rate and rhythm with normal S1/S2 without murmurs, rubs or gallops. Abdomen: Soft, non-tender, non-distended with normal bowel sounds. Musculoskeletal:  No clubbing, cyanosis or edema bilaterally. Full range of motion without deformity. Skin: Skin color, texture, turgor normal.  No rashes or lesions. Neurologic:  Neurovascularly intact without any focal sensory/motor deficits. Cranial nerves: II-XII intact, grossly non-focal.  Tremors present. Psychiatric:  Alert and oriented, thought content appropriate, normal insight  Capillary Refill: Brisk,< 3 seconds   Peripheral Pulses: +2 palpable, equal bilaterally       Labs: For convenience and continuity at follow-up the following most recent labs are provided:      CBC:    Lab Results   Component Value Date    WBC 14.0 09/09/2018    HGB 9.0 09/09/2018    HCT 28.1 09/09/2018     09/09/2018       Renal:    Lab Results   Component Value Date     09/09/2018    K 3.9 09/09/2018     09/09/2018    CO2 25 09/09/2018    BUN 34 09/09/2018    CREATININE 0.9 09/09/2018    CALCIUM 8.5 09/09/2018    PHOS 3.9 09/09/2018         Significant Diagnostic Studies    Radiology:   XR CHEST STANDARD (2 VW)   Final Result   Suspected left basilar airspace disease, atelectasis or pneumonia. Consults:     IP CONSULT TO HOSPITALIST  IP CONSULT TO DIETITIAN    Disposition:  home     Condition at Discharge: Stable    Discharge Instructions/Follow-up:  Follow up with PCP within 1-2 weeks and have another CXR two months from now to make sure the lungs have cleared up.        Code Status:  Full Code     Activity: activity as tolerated    Diet: diabetic diet      Discharge Medications:     Current Discharge Medication List           Details   levofloxacin (LEVAQUIN) 500 MG tablet Take 1 tablet by mouth daily for 5 days  Qty: 5 tablet, Refills: 0      docusate (COLACE, DULCOLAX) 100 MG CAPS Take 100 mg by mouth daily  Qty: 10 capsule, Refills: 0      predniSONE (DELTASONE) 20 MG tablet Take 2 tablets by mouth daily for J45.40  Qty: 120 vial, Refills: 3    Associated Diagnoses: Asthma with acute exacerbation, unspecified asthma severity, unspecified whether persistent      Respiratory Therapy Supplies (NEBULIZER COMPRESSOR) KIT Use Q6 hours as needed with Duoneb. Qty: 1 kit, Refills: 0    Associated Diagnoses: Asthma with acute exacerbation, unspecified asthma severity, unspecified whether persistent      lamoTRIgine (LAMICTAL) 25 MG tablet Take 25 mg by mouth 2 times daily       glucose blood VI test strips (QUIQUE CONTOUR TEST) strip CHECK GLUCOSE DAILY AS NEEDED  Qty: 100 strip, Refills: 2      Multiple Vitamins-Minerals (THERAPEUTIC MULTIVITAMIN-MINERALS) tablet Take 1 tablet by mouth daily      Alpha-Lipoic Acid 300 MG CAPS Take 300 mg by mouth daily      Lift Chair MISC Use daily due to leg weakness. Qty: 1 each, Refills: 0      Handicap Placard MISC by Does not apply route. 12/12/14-12/12/19  Qty: 1 each, Refills: 0      aspirin EC 81 MG EC tablet Take 1 tablet by mouth daily. Start taking next week. Indications: OTC  Qty: 30 tablet, Refills: 2               Time Spent on discharge is more than 30 minutes in the examination, evaluation, counseling and review of medications and discharge plan. Signed:    Chacho Bergeron MD   9/9/2018      Thank you Claire Brar MD for the opportunity to be involved in this patient's care. If you have any questions or concerns please feel free to contact me at [phone].

## 2018-09-09 NOTE — PROGRESS NOTES
09/08/18 2330   NIV Type   Equipment Type V60   Mode BIPAP   Mask Type Full face mask   Mask Size Medium   Bonnet size Medium   Settings/Measurements   Comfort Level Good   Using Accessory Muscles No   IPAP 12 cmH20   EPAP 6 cmH2O   Resp 25   SpO2 99   FiO2  21 %   Vt Exhaled 406 mL   Mask Leak (lpm) 11 lpm   Skin Protection for O2 Device Yes   Breath Sounds   Right Upper Lobe Diminished   Right Middle Lobe Diminished   Right Lower Lobe Diminished   Left Upper Lobe Diminished   Left Lower Lobe Diminished   Alarm Settings   Alarms On Y   Press Low Alarm 6 cmH2O   High Pressure Alarm 35 cmH2O   Resp Rate Low Alarm 6   High Respiratory Rate 45 br/min

## 2018-09-09 NOTE — FLOWSHEET NOTE
09/08/18 2035   Assessment   Charting Type Shift assessment   Neurological   Neuro (WDL) WDL   Level of Consciousness 0   HEENT   HEENT (WDL) X   Right Eye Impaired vision  (readers)   Left Eye Impaired vision  (readers)   Throat Intact   Tongue Pink & moist   Voice Normal   Mucous Membrane Moist;Pink   Teeth Intact   Respiratory   Respiratory (WDL) X   Respiratory Pattern Regular   Respiratory Depth Normal   Respiratory Quality/Effort Dyspnea at rest;Dyspnea with exertion   Chest Assessment Chest expansion symmetrical;Trachea midline   Breath Sounds   Right Upper Lobe Fine Crackles;Diminished   Right Middle Lobe Fine Crackles;Diminished   Right Lower Lobe Fine Crackles;Diminished   Left Upper Lobe Fine Crackles;Diminished   Left Lower Lobe Fine Crackles;Diminished   Cardiac   Cardiac (WDL) X   Cardiac Regularity Regular   Heart Sounds S1, S2   Cardiac Rhythm ST;NSR   Cardiac Monitor   Telemetry Monitor On Yes   Telemetry Audible Yes   Telemetry Alarms Set Yes   Telemetry Box Number 23   Gastrointestinal   Abdominal (WDL) X   Abdomen Inspection Rounded; Soft   Tenderness Soft; No guarding;Nontender; No rebound   RUQ Bowel Sounds Active   LUQ Bowel Sounds Active   RLQ Bowel Sounds Active   LLQ Bowel Sounds Active   Peripheral Vascular   Peripheral Vascular (WDL) X   Edema Right lower extremity; Left lower extremity   RLE Edema +1;Non-pitting   LLE Edema +1;Non-pitting   Skin Color/Condition   Skin Color/Condition (WDL) WDL   Skin Integrity   Skin Integrity (WDL) WDL   Musculoskeletal   Musculoskeletal (WDL) X   RUE Full movement   LUE Full movement   RL Extremity Full movement; Unsteady   LL Extremity Full movement; Unsteady   Genitourinary   Genitourinary (WDL) WDL   Flank Tenderness No   Suprapubic Tenderness No   Dysuria No   Urine Assessment   Incontinence No   Anus/Rectum   Anus/Rectum (WDL) WDL   Psychosocial   Psychosocial (WDL) WDL

## 2018-09-09 NOTE — PLAN OF CARE
Problem: Risk for Impaired Skin Integrity  Goal: Tissue integrity - skin and mucous membranes  Structural intactness and normal physiological function of skin and  mucous membranes. Outcome: Ongoing  Pt is a Q2H, educated pt on preventing skin breakdown. See flowsheet for assessment    Problem: Falls - Risk of:  Goal: Will remain free from falls  Will remain free from falls   Outcome: Ongoing  Pt high fall risk. Instructed to use call light before getting out of bed. Call light within reach. Bed in low position. Bed alarm on. Will continue to monitor. Problem: Pain:  Goal: Pain level will decrease  Pain level will decrease   Outcome: Ongoing  Pt states she undestands the 0-10 numeric pain scale. Pt also states that her pain is being adequately treated and will reach out to report changes.

## 2018-09-10 ENCOUNTER — CARE COORDINATION (OUTPATIENT)
Dept: CASE MANAGEMENT | Age: 78
End: 2018-09-10

## 2018-09-10 DIAGNOSIS — I50.32 CHRONIC DIASTOLIC CHF (CONGESTIVE HEART FAILURE) (HCC): Primary | Chronic | ICD-10-CM

## 2018-09-10 LAB
EKG ATRIAL RATE: 105 BPM
EKG DIAGNOSIS: NORMAL
EKG P AXIS: 56 DEGREES
EKG P-R INTERVAL: 142 MS
EKG Q-T INTERVAL: 332 MS
EKG QRS DURATION: 88 MS
EKG QTC CALCULATION (BAZETT): 438 MS
EKG R AXIS: 57 DEGREES
EKG T AXIS: 70 DEGREES
EKG VENTRICULAR RATE: 105 BPM

## 2018-09-10 NOTE — CARE COORDINATION
9/14/2018 2:30 PM Rahul Hsieh MD [facility]   11/14/2018 10:00 AM MAHAD Brown - CNP [facility]   2/4/2019 10:15 AM MD BIGG [facility]       Lamonte Patricio RN

## 2018-09-12 LAB — MYCOPLASMA PNEUMONIAE IGM: 0.08

## 2018-09-13 ENCOUNTER — CARE COORDINATION (OUTPATIENT)
Dept: CASE MANAGEMENT | Age: 78
End: 2018-09-13

## 2018-09-13 LAB
BLOOD CULTURE, ROUTINE: NORMAL
CULTURE, BLOOD 2: NORMAL

## 2018-09-13 NOTE — CARE COORDINATION
Michael [facility] Transitions Follow Up Call    2018    Patient: Lalita Naranjo  Patient : 1940   MRN: [id number]  Reason for Admission: PNA  Discharge Date: 18 RARS: Readmission Risk Score: 23       Spoke with: patient Niru Dorantes and her  [facility] Transitions Subsequent and Final Call    Subsequent and Final Calls  Do you have any ongoing symptoms?:  Yes  Patient-reported symptoms:  Shortness of Breath, Fatigue  Have your medications changed?:  No  Do you have any questions related to your medications?:  No  Do you currently have any active services?:  No  Do you have any needs or concerns that I can assist you with?:  No  Identified Barriers:  None  Care Transitions Interventions  Other Interventions:        Spoke with both patient and . Both state she's going ok but she's still weak and not up to par. Said she still has issues with SOB, mostly with exertion but sometimes at rest also. I could hear some wheezing while on the phone. States the nebulizer tx's help as well as her inhalers. Has non-productive cough but states she does have clear phelgm in the back of her throat that she can cough up.  02 sat 94%, no fevers, no CP. Has appt with PCP tomorrow. Denies any needs at present time, very appreciative of call. Agreeable to f/u call next week. Reminded patient that if they have any questions/concerns at anytime, they can always utilize CTC or call PCP/specialist as they have an MD on call .      Follow Up  Future Appointments  Date Time Provider Katy Booker   2018 2:30 PM Andre Koch MD [facility]   2018 10:00 AM MAHAD Donahue - CNP [facility]   2019 10:15 AM [facility]       Acacia Harris RN

## 2018-09-14 ENCOUNTER — OFFICE VISIT (OUTPATIENT)
Dept: FAMILY MEDICINE CLINIC | Age: 78
End: 2018-09-14

## 2018-09-14 ENCOUNTER — CARE COORDINATION (OUTPATIENT)
Dept: CARE COORDINATION | Age: 78
End: 2018-09-14

## 2018-09-14 VITALS
WEIGHT: 258 LBS | DIASTOLIC BLOOD PRESSURE: 78 MMHG | SYSTOLIC BLOOD PRESSURE: 128 MMHG | BODY MASS INDEX: 41.46 KG/M2 | OXYGEN SATURATION: 96 % | HEART RATE: 115 BPM | HEIGHT: 66 IN

## 2018-09-14 DIAGNOSIS — J18.9 COMMUNITY ACQUIRED PNEUMONIA OF LEFT LOWER LOBE OF LUNG: Primary | ICD-10-CM

## 2018-09-14 DIAGNOSIS — J45.41 MODERATE PERSISTENT ASTHMA WITH EXACERBATION: ICD-10-CM

## 2018-09-14 DIAGNOSIS — G95.20 CERVICAL SPINAL CORD COMPRESSION (HCC): ICD-10-CM

## 2018-09-14 DIAGNOSIS — I50.32 CHRONIC DIASTOLIC CHF (CONGESTIVE HEART FAILURE) (HCC): ICD-10-CM

## 2018-09-14 PROCEDURE — G0008 ADMIN INFLUENZA VIRUS VAC: HCPCS | Performed by: INTERNAL MEDICINE

## 2018-09-14 PROCEDURE — 99495 TRANSJ CARE MGMT MOD F2F 14D: CPT | Performed by: INTERNAL MEDICINE

## 2018-09-14 PROCEDURE — 90662 IIV NO PRSV INCREASED AG IM: CPT | Performed by: INTERNAL MEDICINE

## 2018-09-14 PROCEDURE — 1111F DSCHRG MED/CURRENT MED MERGE: CPT | Performed by: INTERNAL MEDICINE

## 2018-09-14 RX ORDER — LOSARTAN POTASSIUM 100 MG/1
TABLET ORAL
Qty: 90 TABLET | Refills: 1 | Status: ON HOLD | OUTPATIENT
Start: 2018-09-14 | End: 2018-10-02

## 2018-09-14 NOTE — PATIENT INSTRUCTIONS
Patient Education        Breathing Techniques for COPD: Care Instructions  Your Care Instructions    Breathing is hard when you have chronic obstructive pulmonary disease (COPD). You may take quick, short breaths. Breathing this way makes it harder to get air into your lungs. Learning new ways to control your breathing may help. You may feel better and be able to do more. You can try three basic ways to control your breathing. They are pursed-lip breathing, diaphragmatic breathing, and breathing while bending. Use these methods when you are more short of breath than normal. Practice them often so you can do them well. Follow-up care is a key part of your treatment and safety. Be sure to make and go to all appointments, and call your doctor if you are having problems. It's also a good idea to know your test results and keep a list of the medicines you take. How can you care for yourself at home? · Pursed-lip breathing helps you breathe more air out so that your next breath can be deeper. For this type of breathing, you breathe in through your nose and out through your mouth while almost closing your lips. Breathe in for about 2 seconds, and breathe out for 4 to 6 seconds. Pursed-lip breathing decreases shortness of breath and improves your ability to exercise. · Diaphragmatic breathing helps your lungs expand so that they take in more air. ¨ Lie on your back, or prop yourself up on several pillows. ¨ Put one hand on your belly and the other on your chest. When you breathe in, push your belly out as far as possible. You should feel the hand on your belly move out, while the hand on your chest does not move. ¨ When you breathe out, you should feel the hand on your belly move in. When you can do this type of breathing well while lying down, learn to do it while sitting or standing. Many people with COPD find this breathing method helpful.   ¨ Practice diaphragmatic breathing for 20 minutes, 2 or 3 times a

## 2018-09-14 NOTE — PROGRESS NOTES
Post-Discharge Transitional Care Management Services or Hospital Follow Up      Jose Escalante   YOB: 1940    Date of Office Visit:  9/14/2018  Date of Hospital Admission: 9/7/18  Date of Hospital Discharge: 9/9/18  Readmission Risk Score(high >=14%.  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    DM2 [facility]diabetes mellitus, type 2) [facility]    Moderate persistent asthma    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-sided sciatica    Morbid obesity with BMI of 40.0-44.9, adult ([address]    CAD (coronary artery disease)    Hx stage 2 sacral decubitus ulcer    Parkinson's disease ([address]    BETSY treated with BiPAP    Hypothyroidism    Hepatic steatosis    Diverticulosis    Blind left eye    L carotid artery stenosis s/p CEA    Cervical spinal cord compression s/p ACDF    Sepsis ([address]    Elevated BUN:Cr ratio    Elevated brain natriuretic peptide (BNP) level    Community acquired pneumonia of left lower lobe of lung (HCC)    Acute hypoxemic respiratory failure (HCC)    Chronic diastolic CHF (grade 2 LVDD)    Chest tightness       Allergies   Allergen Reactions    Pyridium [Phenazopyridine Hcl]     Reglan [Metoclopramide Hcl]     Reglan [Metoclopramide Hcl]      tremors       Medications listed as ordered at the time of discharge from hospital   Tino Lewis   Home Medication Instructions JAMILA:    Printed on:09/14/18 8864   Medication Information                      albuterol sulfate HFA (VENTOLIN HFA) 108 (90 Base) MCG/ACT inhaler  Inhale 2 puffs into the lungs every 6 hours as needed for Wheezing             Alpha-Lipoic Acid 300 MG CAPS  Take 300 mg by mouth daily             aspirin EC 81 MG EC tablet  Take 1 tablet by mouth daily. Start taking next week. Indications: OTC             atorvastatin (LIPITOR) 40 MG tablet  Take 1 and 1/2 tablets by mouth every evening             budesonide (PULMICORT FLEXHALER) 180 MCG/ACT AEPB inhaler  Inhale 2 puffs into the lungs 2 times daily             carbidopa-levodopa (SINEMET)  MG per tablet  TAKE 1 TABLET BY MOUTH 3 TIMES A DAY             celecoxib (CELEBREX) 200 MG capsule  Take 1 capsule by mouth daily. DULoxetine (CYMBALTA) 60 MG extended release capsule  Take 1 capsule by mouth daily             fluticasone (FLONASE) 50 MCG/ACT nasal spray  USE 2 PUFFS IN EACH NOSTRIL DAILY             gabapentin (NEURONTIN) 100 MG capsule  Take 1 capsule by mouth daily. Ange Wahl glucose blood VI test strips (QUIQUE CONTOUR TEST) strip  CHECK GLUCOSE DAILY AS NEEDED             Handicap Placard MISC  by Does not apply route. 12/12/14-12/12/19             ipratropium-albuterol (DUONEB) 0.5-2.5 (3) MG/3ML SOLN nebulizer solution  Inhale 3 mLs into the lungs 4 times daily DX: J45.40             lamoTRIgine (LAMICTAL) 25 MG tablet  Take 25 mg by mouth 2 times daily              Lift Chair MISC  Use daily due to leg weakness. losartan (COZAAR) 50 MG tablet  Take 1 tablet by mouth daily. Misc. Devices (FOAM CHAIR CUSHION) MISC  Seat cushion skin protection width 22 inches or greater  Diagnosis: Stage 2 sacral decubitus ulcer, L89.152  Duration: 99             montelukast (SINGULAIR) 10 MG tablet  Take 1 tablet by mouth daily.              Multiple Vitamins-Minerals (THERAPEUTIC MULTIVITAMIN-MINERALS) tablet  Take 1 tablet by mouth daily             Respiratory Therapy Supplies (NEBULIZER COMPRESSOR) KIT  Use Q6 hours as needed with Duoneb.             salmeterol (SEREVENT) 50 MCG/DOSE diskus inhaler  Inhale 1 puff by mouth 2 times a day. Medications marked \"taking\" at this time  Outpatient Prescriptions Marked as Taking for the 9/14/18 encounter (Office Visit) with Taylor Eason MD   Medication Sig Dispense Refill    losartan (COZAAR) 50 MG tablet Take 1 tablet by mouth daily. 90 tablet 1    gabapentin (NEURONTIN) 100 MG capsule Take 1 capsule by mouth daily. . 90 capsule 0    carbidopa-levodopa (SINEMET)  MG per tablet TAKE 1 TABLET BY MOUTH 3 TIMES A DAY 90 tablet 1    atorvastatin (LIPITOR) 40 MG tablet Take 1 and 1/2 tablets by mouth every evening 135 tablet 1    montelukast (SINGULAIR) 10 MG tablet Take 1 tablet by mouth daily. 90 tablet 1    celecoxib (CELEBREX) 200 MG capsule Take 1 capsule by mouth daily. 90 capsule 1    DULoxetine (CYMBALTA) 60 MG extended release capsule Take 1 capsule by mouth daily 90 capsule 1    albuterol sulfate HFA (VENTOLIN HFA) 108 (90 Base) MCG/ACT inhaler Inhale 2 puffs into the lungs every 6 hours as needed for Wheezing 3 Inhaler 1    fluticasone (FLONASE) 50 MCG/ACT nasal spray USE 2 PUFFS IN EACH NOSTRIL DAILY 1 Bottle 12    salmeterol (SEREVENT) 50 MCG/DOSE diskus inhaler Inhale 1 puff by mouth 2 times a day. 180 each 1    budesonide (PULMICORT FLEXHALER) 180 MCG/ACT AEPB inhaler Inhale 2 puffs into the lungs 2 times daily 3 each 1    Misc. Devices (FOAM CHAIR CUSHION) MISC Seat cushion skin protection width 22 inches or greater  Diagnosis: Stage 2 sacral decubitus ulcer, L89.152  Duration: 99 1 each 0    ipratropium-albuterol (DUONEB) 0.5-2.5 (3) MG/3ML SOLN nebulizer solution Inhale 3 mLs into the lungs 4 times daily DX: J45.40 120 vial 3    Respiratory Therapy Supplies (NEBULIZER COMPRESSOR) KIT Use Q6 hours as needed with Duoneb.  1 kit 0    lamoTRIgine (LAMICTAL) 25 MG tablet Take 25 mg by mouth 2 times daily       glucose blood VI test strips discharge. Left eye exhibits no discharge. No scleral icterus. Cardiovascular: Normal rate, regular rhythm and normal heart sounds. Pulmonary/Chest: Effort normal. No respiratory distress. She has no wheezes. She has rhonchi. Musculoskeletal: She exhibits no edema. Lymphadenopathy:     She has no cervical adenopathy. Skin: No rash noted. Vitals reviewed. Assessment/Plan:  Priscilla Jackson was seen today for follow-up from hospital and ankle pain. Diagnoses and all orders for this visit:    Community acquired pneumonia of left lower lobe of lung ([facility] [address] [facility]    Moderate persistent asthma with exacerbation    Other orders  -     Cancel: Tdap (ADACEL) 5-2-15.5 LF-MCG/0.5 injection; Inject 0.5 mLs into the muscle once for 1 dose  -     Cancel: zoster recombinant adjuvanted vaccine (SHINGRIX) 50 MCG SUSR injection; 50 MCG IM then repeat 2-6 months. -     losartan (COZAAR) 100 MG tablet; Take 1 tablet by mouth daily.  -     INFLUENZA, HIGH DOSE, 65 YRS +, IM, PF, PREFILL SYR, 0.5ML (FLUZONE HD)  -     ipratropium-albuterol (DUONEB) 0.5-2.5 (3) MG/3ML SOLN nebulizer solution; Inhale 3 mLs into the lungs 4 times daily DX: J45.40        Improving, complete steroids and antibiotics.     Medical Decision Making: moderate complexity

## 2018-09-17 ENCOUNTER — CARE COORDINATION (OUTPATIENT)
Dept: CASE MANAGEMENT | Age: 78
End: 2018-09-17

## 2018-09-17 RX ORDER — IPRATROPIUM BROMIDE AND ALBUTEROL SULFATE 2.5; .5 MG/3ML; MG/3ML
1 SOLUTION RESPIRATORY (INHALATION) 4 TIMES DAILY
Qty: 120 VIAL | Refills: 3 | Status: ON HOLD | OUTPATIENT
Start: 2018-09-17 | End: 2018-12-14

## 2018-09-17 ASSESSMENT — ENCOUNTER SYMPTOMS
COUGH: 1
SHORTNESS OF BREATH: 1

## 2018-09-18 NOTE — CARE COORDINATION
Late Entry   ACC: Kushal Fontana RN CM Risk Score: 9  Theron Mortality Risk Score: 21     Summary/Assessment:   Saw patient in tandem with PCP   Reports feeling she is improving. Remains with cough and shortness of breath with exertion. Neb txs, antibx continue - compliant with inhaled meds. Reviewed sx to call-Patient voices understanding. Agrees to having OMKAR videos emailed. Test email sent and received. Plan:  Follow post CTC   Email OMKAR videos      Future Appointments  Date Time Provider Katy Booker   10/30/2018 1:00 PM MD BIGG Felder [facility]   11/14/2018 10:00 AM MAHAD Alvarado CNP [facility]   2/4/2019 10:15 AM MD BIGG Felder [facility]         Care Coordination Interventions    Program Enrollment:  Complex Care  Referral from Primary Care Provider:  No  Suggested Interventions and Community Resources  Occupational Therapy:  Completed (Comment: Assist with home function and make recommendations about adaptive equipment)  Physical Therapy:  Completed (Comment: LSVT)  Other Therapy Services:  Completed (Comment: Speech )  Other Services or Interventions:  Consider wound care after examination of skin on 4/12/18           No goal setting at this time.     Goals Addressed     None

## 2018-09-21 ENCOUNTER — CARE COORDINATION (OUTPATIENT)
Dept: CASE MANAGEMENT | Age: 78
End: 2018-09-21

## 2018-09-26 ENCOUNTER — APPOINTMENT (OUTPATIENT)
Dept: GENERAL RADIOLOGY | Age: 78
DRG: 291 | End: 2018-09-26
Payer: MEDICARE

## 2018-09-26 ENCOUNTER — TELEPHONE (OUTPATIENT)
Dept: OTHER | Facility: CLINIC | Age: 78
End: 2018-09-26

## 2018-09-26 ENCOUNTER — OFFICE VISIT (OUTPATIENT)
Dept: FAMILY MEDICINE CLINIC | Age: 78
End: 2018-09-26
Payer: MEDICARE

## 2018-09-26 ENCOUNTER — HOSPITAL ENCOUNTER (INPATIENT)
Age: 78
LOS: 7 days | Discharge: NURSING, REHAB OR LONG-TERM CARE | DRG: 291 | End: 2018-10-03
Attending: EMERGENCY MEDICINE | Admitting: INTERNAL MEDICINE
Payer: MEDICARE

## 2018-09-26 ENCOUNTER — APPOINTMENT (OUTPATIENT)
Dept: CT IMAGING | Age: 78
DRG: 291 | End: 2018-09-26
Payer: MEDICARE

## 2018-09-26 VITALS
HEIGHT: 66 IN | TEMPERATURE: 97.9 F | OXYGEN SATURATION: 93 % | HEART RATE: 104 BPM | WEIGHT: 268 LBS | SYSTOLIC BLOOD PRESSURE: 158 MMHG | DIASTOLIC BLOOD PRESSURE: 92 MMHG | BODY MASS INDEX: 43.07 KG/M2 | RESPIRATION RATE: 23 BRPM

## 2018-09-26 DIAGNOSIS — J18.9 COMMUNITY ACQUIRED PNEUMONIA OF LEFT LOWER LOBE OF LUNG: Primary | ICD-10-CM

## 2018-09-26 DIAGNOSIS — R07.9 CHEST PAIN, UNSPECIFIED TYPE: ICD-10-CM

## 2018-09-26 DIAGNOSIS — I50.9 ACUTE CONGESTIVE HEART FAILURE, UNSPECIFIED HEART FAILURE TYPE (HCC): Primary | ICD-10-CM

## 2018-09-26 DIAGNOSIS — R06.89 DYSPNEA AND RESPIRATORY ABNORMALITIES: ICD-10-CM

## 2018-09-26 DIAGNOSIS — R06.00 DYSPNEA AND RESPIRATORY ABNORMALITIES: ICD-10-CM

## 2018-09-26 DIAGNOSIS — R77.8 ELEVATED TROPONIN: ICD-10-CM

## 2018-09-26 PROBLEM — I50.43 CHF (CONGESTIVE HEART FAILURE), NYHA CLASS I, ACUTE ON CHRONIC, COMBINED (HCC): Status: ACTIVE | Noted: 2018-09-26

## 2018-09-26 LAB
A/G RATIO: 1.1 (ref 1.1–2.2)
ALBUMIN SERPL-MCNC: 3.6 G/DL (ref 3.4–5)
ALP BLD-CCNC: 119 U/L (ref 40–129)
ALT SERPL-CCNC: <5 U/L (ref 10–40)
ANION GAP SERPL CALCULATED.3IONS-SCNC: 13 MMOL/L (ref 3–16)
AST SERPL-CCNC: 19 U/L (ref 15–37)
BASOPHILS ABSOLUTE: 0.1 K/UL (ref 0–0.2)
BASOPHILS RELATIVE PERCENT: 0.4 %
BILIRUB SERPL-MCNC: 0.7 MG/DL (ref 0–1)
BUN BLDV-MCNC: 22 MG/DL (ref 7–20)
CALCIUM SERPL-MCNC: 8.4 MG/DL (ref 8.3–10.6)
CHLORIDE BLD-SCNC: 102 MMOL/L (ref 99–110)
CO2: 24 MMOL/L (ref 21–32)
CREAT SERPL-MCNC: 1 MG/DL (ref 0.6–1.2)
EKG ATRIAL RATE: 100 BPM
EKG DIAGNOSIS: NORMAL
EKG P AXIS: 60 DEGREES
EKG P-R INTERVAL: 150 MS
EKG Q-T INTERVAL: 356 MS
EKG QRS DURATION: 92 MS
EKG QTC CALCULATION (BAZETT): 459 MS
EKG R AXIS: 67 DEGREES
EKG T AXIS: 57 DEGREES
EKG VENTRICULAR RATE: 100 BPM
EOSINOPHILS ABSOLUTE: 0.1 K/UL (ref 0–0.6)
EOSINOPHILS RELATIVE PERCENT: 1.2 %
GFR AFRICAN AMERICAN: >60
GFR NON-AFRICAN AMERICAN: 54
GLOBULIN: 3.3 G/DL
GLUCOSE BLD-MCNC: 115 MG/DL (ref 70–99)
GLUCOSE BLD-MCNC: 194 MG/DL (ref 70–99)
GLUCOSE BLD-MCNC: 274 MG/DL (ref 70–99)
HCT VFR BLD CALC: 34.2 % (ref 36–48)
HEMOGLOBIN: 10.8 G/DL (ref 12–16)
LYMPHOCYTES ABSOLUTE: 1 K/UL (ref 1–5.1)
LYMPHOCYTES RELATIVE PERCENT: 8.1 %
MCH RBC QN AUTO: 26.6 PG (ref 26–34)
MCHC RBC AUTO-ENTMCNC: 31.7 G/DL (ref 31–36)
MCV RBC AUTO: 84 FL (ref 80–100)
MONOCYTES ABSOLUTE: 0.9 K/UL (ref 0–1.3)
MONOCYTES RELATIVE PERCENT: 6.9 %
NEUTROPHILS ABSOLUTE: 10.4 K/UL (ref 1.7–7.7)
NEUTROPHILS RELATIVE PERCENT: 83.4 %
PDW BLD-RTO: 15.9 % (ref 12.4–15.4)
PERFORMED ON: ABNORMAL
PERFORMED ON: ABNORMAL
PLATELET # BLD: 223 K/UL (ref 135–450)
PMV BLD AUTO: 8.4 FL (ref 5–10.5)
POTASSIUM SERPL-SCNC: 4.4 MMOL/L (ref 3.5–5.1)
PRO-BNP: 1035 PG/ML (ref 0–449)
RBC # BLD: 4.08 M/UL (ref 4–5.2)
SODIUM BLD-SCNC: 139 MMOL/L (ref 136–145)
TOTAL PROTEIN: 6.9 G/DL (ref 6.4–8.2)
TROPONIN: 0.01 NG/ML
TROPONIN: 0.02 NG/ML
TROPONIN: <0.01 NG/ML
WBC # BLD: 12.4 K/UL (ref 4–11)

## 2018-09-26 PROCEDURE — 94640 AIRWAY INHALATION TREATMENT: CPT

## 2018-09-26 PROCEDURE — 6370000000 HC RX 637 (ALT 250 FOR IP): Performed by: INTERNAL MEDICINE

## 2018-09-26 PROCEDURE — 94660 CPAP INITIATION&MGMT: CPT

## 2018-09-26 PROCEDURE — 84484 ASSAY OF TROPONIN QUANT: CPT

## 2018-09-26 PROCEDURE — 94761 N-INVAS EAR/PLS OXIMETRY MLT: CPT

## 2018-09-26 PROCEDURE — 96374 THER/PROPH/DIAG INJ IV PUSH: CPT

## 2018-09-26 PROCEDURE — 1090F PRES/ABSN URINE INCON ASSESS: CPT | Performed by: PHYSICIAN ASSISTANT

## 2018-09-26 PROCEDURE — 2580000003 HC RX 258: Performed by: NURSE PRACTITIONER

## 2018-09-26 PROCEDURE — 71045 X-RAY EXAM CHEST 1 VIEW: CPT

## 2018-09-26 PROCEDURE — 1111F DSCHRG MED/CURRENT MED MERGE: CPT | Performed by: PHYSICIAN ASSISTANT

## 2018-09-26 PROCEDURE — 84443 ASSAY THYROID STIM HORMONE: CPT

## 2018-09-26 PROCEDURE — 6360000002 HC RX W HCPCS: Performed by: INTERNAL MEDICINE

## 2018-09-26 PROCEDURE — 94664 DEMO&/EVAL PT USE INHALER: CPT

## 2018-09-26 PROCEDURE — G8427 DOCREV CUR MEDS BY ELIG CLIN: HCPCS | Performed by: PHYSICIAN ASSISTANT

## 2018-09-26 PROCEDURE — 36415 COLL VENOUS BLD VENIPUNCTURE: CPT

## 2018-09-26 PROCEDURE — 84439 ASSAY OF FREE THYROXINE: CPT

## 2018-09-26 PROCEDURE — 99285 EMERGENCY DEPT VISIT HI MDM: CPT

## 2018-09-26 PROCEDURE — G8417 CALC BMI ABV UP PARAM F/U: HCPCS | Performed by: PHYSICIAN ASSISTANT

## 2018-09-26 PROCEDURE — 2700000000 HC OXYGEN THERAPY PER DAY

## 2018-09-26 PROCEDURE — 83880 ASSAY OF NATRIURETIC PEPTIDE: CPT

## 2018-09-26 PROCEDURE — 83036 HEMOGLOBIN GLYCOSYLATED A1C: CPT

## 2018-09-26 PROCEDURE — 93005 ELECTROCARDIOGRAM TRACING: CPT | Performed by: EMERGENCY MEDICINE

## 2018-09-26 PROCEDURE — 6370000000 HC RX 637 (ALT 250 FOR IP): Performed by: EMERGENCY MEDICINE

## 2018-09-26 PROCEDURE — 1036F TOBACCO NON-USER: CPT | Performed by: PHYSICIAN ASSISTANT

## 2018-09-26 PROCEDURE — 6360000002 HC RX W HCPCS: Performed by: EMERGENCY MEDICINE

## 2018-09-26 PROCEDURE — 96375 TX/PRO/DX INJ NEW DRUG ADDON: CPT

## 2018-09-26 PROCEDURE — 4040F PNEUMOC VAC/ADMIN/RCVD: CPT | Performed by: PHYSICIAN ASSISTANT

## 2018-09-26 PROCEDURE — 1101F PT FALLS ASSESS-DOCD LE1/YR: CPT | Performed by: PHYSICIAN ASSISTANT

## 2018-09-26 PROCEDURE — 93010 ELECTROCARDIOGRAM REPORT: CPT | Performed by: INTERNAL MEDICINE

## 2018-09-26 PROCEDURE — 85025 COMPLETE CBC W/AUTO DIFF WBC: CPT

## 2018-09-26 PROCEDURE — 99214 OFFICE O/P EST MOD 30 MIN: CPT | Performed by: PHYSICIAN ASSISTANT

## 2018-09-26 PROCEDURE — G8598 ASA/ANTIPLAT THER USED: HCPCS | Performed by: PHYSICIAN ASSISTANT

## 2018-09-26 PROCEDURE — 2580000003 HC RX 258: Performed by: INTERNAL MEDICINE

## 2018-09-26 PROCEDURE — G8400 PT W/DXA NO RESULTS DOC: HCPCS | Performed by: PHYSICIAN ASSISTANT

## 2018-09-26 PROCEDURE — 1123F ACP DISCUSS/DSCN MKR DOCD: CPT | Performed by: PHYSICIAN ASSISTANT

## 2018-09-26 PROCEDURE — 1200000000 HC SEMI PRIVATE

## 2018-09-26 PROCEDURE — 80053 COMPREHEN METABOLIC PANEL: CPT

## 2018-09-26 RX ORDER — MONTELUKAST SODIUM 10 MG/1
10 TABLET ORAL NIGHTLY
Status: DISCONTINUED | OUTPATIENT
Start: 2018-09-26 | End: 2018-10-03 | Stop reason: HOSPADM

## 2018-09-26 RX ORDER — FUROSEMIDE 10 MG/ML
40 INJECTION INTRAMUSCULAR; INTRAVENOUS 2 TIMES DAILY
Status: DISCONTINUED | OUTPATIENT
Start: 2018-09-26 | End: 2018-09-26

## 2018-09-26 RX ORDER — LIDOCAINE HYDROCHLORIDE 10 MG/ML
5 INJECTION, SOLUTION EPIDURAL; INFILTRATION; INTRACAUDAL; PERINEURAL ONCE
Status: DISCONTINUED | OUTPATIENT
Start: 2018-09-26 | End: 2018-10-01

## 2018-09-26 RX ORDER — NICOTINE POLACRILEX 4 MG
15 LOZENGE BUCCAL PRN
Status: DISCONTINUED | OUTPATIENT
Start: 2018-09-26 | End: 2018-10-03 | Stop reason: HOSPADM

## 2018-09-26 RX ORDER — DEXTROSE MONOHYDRATE 25 G/50ML
12.5 INJECTION, SOLUTION INTRAVENOUS PRN
Status: DISCONTINUED | OUTPATIENT
Start: 2018-09-26 | End: 2018-10-03 | Stop reason: HOSPADM

## 2018-09-26 RX ORDER — IPRATROPIUM BROMIDE AND ALBUTEROL SULFATE 2.5; .5 MG/3ML; MG/3ML
1 SOLUTION RESPIRATORY (INHALATION) 4 TIMES DAILY
Status: DISCONTINUED | OUTPATIENT
Start: 2018-09-26 | End: 2018-09-26

## 2018-09-26 RX ORDER — DEXTROSE MONOHYDRATE 50 MG/ML
100 INJECTION, SOLUTION INTRAVENOUS PRN
Status: DISCONTINUED | OUTPATIENT
Start: 2018-09-26 | End: 2018-10-03 | Stop reason: HOSPADM

## 2018-09-26 RX ORDER — SODIUM CHLORIDE 0.9 % (FLUSH) 0.9 %
10 SYRINGE (ML) INJECTION EVERY 12 HOURS SCHEDULED
Status: DISCONTINUED | OUTPATIENT
Start: 2018-09-26 | End: 2018-10-03 | Stop reason: HOSPADM

## 2018-09-26 RX ORDER — ASPIRIN 81 MG/1
81 TABLET ORAL DAILY
Status: DISCONTINUED | OUTPATIENT
Start: 2018-09-26 | End: 2018-09-28

## 2018-09-26 RX ORDER — IPRATROPIUM BROMIDE AND ALBUTEROL SULFATE 2.5; .5 MG/3ML; MG/3ML
1 SOLUTION RESPIRATORY (INHALATION)
Status: DISCONTINUED | OUTPATIENT
Start: 2018-09-27 | End: 2018-10-03 | Stop reason: HOSPADM

## 2018-09-26 RX ORDER — ALBUTEROL SULFATE 90 UG/1
2 AEROSOL, METERED RESPIRATORY (INHALATION) EVERY 6 HOURS PRN
Status: DISCONTINUED | OUTPATIENT
Start: 2018-09-26 | End: 2018-10-03 | Stop reason: HOSPADM

## 2018-09-26 RX ORDER — DULOXETIN HYDROCHLORIDE 60 MG/1
60 CAPSULE, DELAYED RELEASE ORAL DAILY
Status: DISCONTINUED | OUTPATIENT
Start: 2018-09-26 | End: 2018-10-03 | Stop reason: HOSPADM

## 2018-09-26 RX ORDER — SODIUM CHLORIDE 0.9 % (FLUSH) 0.9 %
10 SYRINGE (ML) INJECTION PRN
Status: DISCONTINUED | OUTPATIENT
Start: 2018-09-26 | End: 2018-10-03 | Stop reason: HOSPADM

## 2018-09-26 RX ORDER — IPRATROPIUM BROMIDE AND ALBUTEROL SULFATE 2.5; .5 MG/3ML; MG/3ML
2 SOLUTION RESPIRATORY (INHALATION) ONCE
Status: COMPLETED | OUTPATIENT
Start: 2018-09-26 | End: 2018-09-26

## 2018-09-26 RX ORDER — LOSARTAN POTASSIUM 100 MG/1
100 TABLET ORAL DAILY
Status: DISCONTINUED | OUTPATIENT
Start: 2018-09-26 | End: 2018-09-30

## 2018-09-26 RX ORDER — FUROSEMIDE 10 MG/ML
80 INJECTION INTRAMUSCULAR; INTRAVENOUS 2 TIMES DAILY
Status: DISCONTINUED | OUTPATIENT
Start: 2018-09-26 | End: 2018-09-27

## 2018-09-26 RX ORDER — METHYLPREDNISOLONE SODIUM SUCCINATE 125 MG/2ML
80 INJECTION, POWDER, LYOPHILIZED, FOR SOLUTION INTRAMUSCULAR; INTRAVENOUS ONCE
Status: COMPLETED | OUTPATIENT
Start: 2018-09-26 | End: 2018-09-26

## 2018-09-26 RX ORDER — ONDANSETRON 2 MG/ML
4 INJECTION INTRAMUSCULAR; INTRAVENOUS EVERY 6 HOURS PRN
Status: DISCONTINUED | OUTPATIENT
Start: 2018-09-26 | End: 2018-09-28

## 2018-09-26 RX ORDER — ATORVASTATIN CALCIUM 40 MG/1
20 TABLET, FILM COATED ORAL DAILY
Status: DISCONTINUED | OUTPATIENT
Start: 2018-09-26 | End: 2018-10-03 | Stop reason: HOSPADM

## 2018-09-26 RX ORDER — GABAPENTIN 100 MG/1
100 CAPSULE ORAL NIGHTLY
Status: DISCONTINUED | OUTPATIENT
Start: 2018-09-26 | End: 2018-10-03 | Stop reason: HOSPADM

## 2018-09-26 RX ORDER — FUROSEMIDE 10 MG/ML
40 INJECTION INTRAMUSCULAR; INTRAVENOUS ONCE
Status: COMPLETED | OUTPATIENT
Start: 2018-09-26 | End: 2018-09-26

## 2018-09-26 RX ORDER — LAMOTRIGINE 25 MG/1
25 TABLET ORAL 2 TIMES DAILY
Status: DISCONTINUED | OUTPATIENT
Start: 2018-09-26 | End: 2018-10-03 | Stop reason: HOSPADM

## 2018-09-26 RX ADMIN — Medication 1 PUFF: at 20:22

## 2018-09-26 RX ADMIN — NITROGLYCERIN 0.5 INCH: 20 OINTMENT TOPICAL at 13:11

## 2018-09-26 RX ADMIN — INSULIN LISPRO 2 UNITS: 100 INJECTION, SOLUTION INTRAVENOUS; SUBCUTANEOUS at 21:54

## 2018-09-26 RX ADMIN — ATORVASTATIN CALCIUM 20 MG: 40 TABLET, FILM COATED ORAL at 21:45

## 2018-09-26 RX ADMIN — Medication 10 ML: at 22:15

## 2018-09-26 RX ADMIN — IPRATROPIUM BROMIDE AND ALBUTEROL SULFATE 3 ML: .5; 3 SOLUTION RESPIRATORY (INHALATION) at 16:00

## 2018-09-26 RX ADMIN — IPRATROPIUM BROMIDE AND ALBUTEROL SULFATE 3 ML: .5; 3 SOLUTION RESPIRATORY (INHALATION) at 20:22

## 2018-09-26 RX ADMIN — GABAPENTIN 100 MG: 100 CAPSULE ORAL at 21:53

## 2018-09-26 RX ADMIN — ENOXAPARIN SODIUM 120 MG: 120 INJECTION SUBCUTANEOUS at 16:49

## 2018-09-26 RX ADMIN — IPRATROPIUM BROMIDE AND ALBUTEROL SULFATE 2 AMPULE: .5; 3 SOLUTION RESPIRATORY (INHALATION) at 11:15

## 2018-09-26 RX ADMIN — METHYLPREDNISOLONE SODIUM SUCCINATE 80 MG: 125 INJECTION, POWDER, FOR SOLUTION INTRAMUSCULAR; INTRAVENOUS at 11:24

## 2018-09-26 RX ADMIN — FUROSEMIDE 80 MG: 10 INJECTION, SOLUTION INTRAMUSCULAR; INTRAVENOUS at 21:45

## 2018-09-26 RX ADMIN — MONTELUKAST SODIUM 10 MG: 10 TABLET, COATED ORAL at 21:53

## 2018-09-26 RX ADMIN — CARBIDOPA AND LEVODOPA 1 TABLET: 25; 100 TABLET ORAL at 21:53

## 2018-09-26 RX ADMIN — SODIUM CHLORIDE, PRESERVATIVE FREE 10 ML: 5 INJECTION INTRAVENOUS at 22:15

## 2018-09-26 RX ADMIN — FUROSEMIDE 40 MG: 10 INJECTION, SOLUTION INTRAMUSCULAR; INTRAVENOUS at 13:10

## 2018-09-26 RX ADMIN — LOSARTAN POTASSIUM 100 MG: 100 TABLET, FILM COATED ORAL at 16:48

## 2018-09-26 RX ADMIN — INSULIN LISPRO 1 UNITS: 100 INJECTION, SOLUTION INTRAVENOUS; SUBCUTANEOUS at 18:50

## 2018-09-26 RX ADMIN — CARBIDOPA AND LEVODOPA 1 TABLET: 25; 100 TABLET ORAL at 16:52

## 2018-09-26 RX ADMIN — NITROGLYCERIN 0.5 INCH: 20 OINTMENT TOPICAL at 22:11

## 2018-09-26 ASSESSMENT — PAIN DESCRIPTION - LOCATION: LOCATION: CHEST

## 2018-09-26 ASSESSMENT — PAIN SCALES - GENERAL: PAINLEVEL_OUTOF10: 3

## 2018-09-26 ASSESSMENT — HEART SCORE: ECG: 1

## 2018-09-26 ASSESSMENT — PAIN DESCRIPTION - PAIN TYPE: TYPE: ACUTE PAIN

## 2018-09-26 NOTE — TELEPHONE ENCOUNTER
Writer contacted Trinidad Auguste, ED provider to inform of 30 day readmission risk. ED provider informed writer of readmission.

## 2018-09-26 NOTE — PROGRESS NOTES
[address]        CC:    Chief Complaint   Patient presents with    Pharyngitis     pt had pneumonia 9/7/18 and was seen in [facility] and is still having breathing problems. pt's spouse states that she was having trouble taking a breath and was gasping for air, she used her rescue inhaler and also her nebulizer this morning         HISTORY OF PRESENT ILLNESS:    Palak Sandoval is a 70 y.o. female complains of as per chief complaint. Is gasping for air this morning. Used neb and all inhalers with little relief. Diagnosed Pneumonia on 9/7/18 and hospitalized 3 days. Was improving. However, today awoke unable to take a deep inspiration,  Laryngitis, and  Difficult to to cough. When phlegm was expectorated, it was clear. Was outside the past couple of days. Assoc with frontal headache. Denies head congestion,sore throat. Denies fever, chills. ROS:  Remaining were reviewed and otherwise negative for other constitutional, neurologic, ENT, cardiac, pulmonary, GI,  or other musculoskeletal or extremity complaints. Past Medical/Surgical/ Social HISTORY: Reviewed and updated. MEDICATIONS:  Current Outpatient Prescriptions on File Prior to Visit   Medication Sig Dispense Refill    ipratropium-albuterol (DUONEB) 0.5-2.5 (3) MG/3ML SOLN nebulizer solution Inhale 3 mLs into the lungs 4 times daily DX: J45.40 120 vial 3    losartan (COZAAR) 100 MG tablet Take 1 tablet by mouth daily. 90 tablet 1    gabapentin (NEURONTIN) 100 MG capsule Take 1 capsule by mouth daily. . 90 capsule 0    carbidopa-levodopa (SINEMET)  MG per tablet TAKE 1 TABLET BY MOUTH 3 TIMES A DAY 90 tablet 1    atorvastatin (LIPITOR) 40 MG tablet Take 1 and 1/2 tablets by mouth every evening 135 tablet 1    montelukast (SINGULAIR) 10 MG tablet Take 1 tablet by mouth daily. 90 tablet 1    celecoxib (CELEBREX) 200 MG capsule Take 1 capsule by mouth daily.  90 capsule 1    DULoxetine (CYMBALTA) 60 MG kg)    Height: 5' 6\" (1.676 m)      GENERAL APPEARANCE:  Pleasant, friendly. Well-nourished. Anxious and tachypneic. HEAD: Normocephalic. Atraumatic . EYES:   Conjunctivae pink and moist. Sclera white. Left pupil opacity. EARS:   Negative pinna pull. Ear canals are clear. TM's intact with clear. No mastoid tenderness. NOSE:   No septal deviation. Nares areclear discharge  MOUTH/THROAT:   Lips without lesions or cracking. Moist mucosa. Absent  exudates, erythema and edema. NECK:  Absent  Lymphadenopathy. No masses. HEART:  Regular rate and rhythm. No murmurs, rubs, or gallops. LUNGS: Shallow breath sounds. Tachypneic. No wheezes. No rales or rhonchi. Equal chest percussion. ABDOMEN:  Soft, non-tender. No masses. EXTREMITIES:  Moves all extremities. In wheelchair. NEUROLOGIC: Grossly non focal. Baseline tremors. SKIN: Warm, dry without rashes, petechia, or purpura. No nail clubbing or cyanosis. ADDITIONAL DATA:  Prior notes reviewed. ASSESSMENT:  1. Community acquired pneumonia of left lower lobe of lung ([address]    2. Dyspnea and respiratory abnormalities        PLAN:     Patient recovering from pneumonia. Due to sudden onset dyspnea, will send to emergency department for more complete workup. Differential diagnosis includes but not limited to viral infection Emergency Department, Andry Fernandez RN was contacted to report. She was taken to ER in a wheelchair by medical assistant from our office. This is a patient of  seen here today as overflow.

## 2018-09-26 NOTE — ED PROVIDER NOTES
CHIEF COMPLAINT  Shortness of Breath (Recent pneumonia, diagnosed 9/7. Uses a BIPAP at home. Started with a cough and SOB again today.)      HISTORY OF PRESENT ILLNESS  Fidel Espinosa is a 70 y.o. female who presents to the ED complaining of shortness of breath past day. No evidence of pneumonia and was here earlier this month. She sleeps with CPAP at home. She is not on home oxygen she also says that she had a recent long car ride to 5 hours. He initially told me her legs are not swollen but her  reported to staff that he thought they were swollen but she does not think they are. She has no leg tenderness. She has no headache or dizziness or any nausea or vomiting. Nebulizer treatment before she came in and did not help. She does have a history of asthma but no known history of CHF and no history of any MIs. No history of strokes. She denies any fevers or chills. .   No other complaints, modifying factors or associated symptoms. I have reviewed the following from the nursing documentation.     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-sided sciatica 8/9/2016    CKD (chronic kidney disease) stage 3, GFR 30-59 ml/min     Community acquired pneumonia of left lower lobe of lung ([address] 9/8/2018    CRI     Detached retina, left     Diverticulosis     DJD (degenerative joint disease)     Edema     chronic    Fatty liver     GERD (gastroesophageal reflux disease)     Herniated lumbar intervertebral disc     Hyperlipidemia     Hypertension     Hypothyroid 1/27/2015    Morbid obesity with BMI of 40.0-44.9, adult ([address] 2/10/2017    BETSY treated with BiPAP     Restless leg syndrome     Restless legs syndrome     Sleep apnea     Tremor, 250 lb (113.4 kg)   SpO2 100%   BMI 40.35 kg/m²   GENERAL APPEARANCE: Awake and alert. Cooperative. No acute distress. HEAD: Normocephalic. Atraumatic. EYES: PERRL. EOM's grossly intact. ENT: Mucous membranes are moist.   NECK: Supple. HEART: RRR. LUNGS: Respirations unlabored. CTAB. Good air exchange. Speaking comfortably in full sentences. BACK: No midline spinal tenderness or step-off. ABDOMEN: Soft. Non-distended. Non-tender. No guarding or rebound. Normal bowel sounds. EXTREMITIES: No peripheral edema. Moves all extremities equally. All extremities neurovascularly intact. : Deferred  SKIN: Warm and dry. No acute rashes. NEUROLOGICAL: Alert and oriented. No gross facial drooping. Strength 5/5, sensation intact. Normal coordination. Gait normal.   PSYCHIATRIC: Normal mood and affect. LABS  I have reviewed all labs for this visit.    Results for orders placed or performed during the hospital encounter of 09/26/18   CBC auto differential   Result Value Ref Range    WBC 12.4 (H) 4.0 - 11.0 K/uL    RBC 4.08 4.00 - 5.20 M/uL    Hemoglobin 10.8 (L) 12.0 - 16.0 g/dL    Hematocrit 34.2 (L) 36.0 - 48.0 %    MCV 84.0 80.0 - 100.0 fL    MCH 26.6 26.0 - 34.0 pg    MCHC 31.7 31.0 - 36.0 g/dL    RDW 15.9 (H) 12.4 - 15.4 %    Platelets 538 428 - 527 K/uL    MPV 8.4 5.0 - 10.5 fL    Neutrophils % 83.4 %    Lymphocytes % 8.1 %    Monocytes % 6.9 %    Eosinophils % 1.2 %    Basophils % 0.4 %    Neutrophils # 10.4 (H) 1.7 - 7.7 K/uL    Lymphocytes # 1.0 1.0 - 5.1 K/uL    Monocytes # 0.9 0.0 - 1.3 K/uL    Eosinophils # 0.1 0.0 - 0.6 K/uL    Basophils # 0.1 0.0 - 0.2 K/uL   Comprehensive metabolic panel   Result Value Ref Range    Sodium 139 136 - 145 mmol/L    Potassium 4.4 3.5 - 5.1 mmol/L    Chloride 102 99 - 110 mmol/L    CO2 24 21 - 32 mmol/L    Anion Gap 13 3 - 16    Glucose 115 (H) 70 - 99 mg/dL    BUN 22 (H) 7 - 20 mg/dL    CREATININE 1.0 0.6 - 1.2 mg/dL    GFR Non-African American 54 (A) >60    GFR African American >60 >60    Calcium 8.4 8.3 - 10.6 mg/dL    Total Protein 6.9 6.4 - 8.2 g/dL    Alb 3.6 3.4 - 5.0 g/dL    Albumin/Globulin Ratio 1.1 1.1 - 2.2    Total Bilirubin 0.7 0.0 - 1.0 mg/dL    Alkaline Phosphatase 119 40 - 129 U/L    ALT <5 (L) 10 - 40 U/L    AST 19 15 - 37 U/L    Globulin 3.3 g/dL   Troponin   Result Value Ref Range    Troponin 0.02 (H) <0.01 ng/mL   Brain Natriuretic Peptide   Result Value Ref Range    Pro-BNP 1,035 (H) 0 - 449 pg/mL   EKG 12 Lead   Result Value Ref Range    Ventricular Rate 100 BPM    Atrial Rate 100 BPM    P-R Interval 150 ms    QRS Duration 92 ms    Q-T Interval 356 ms    QTc Calculation (Bazett) 459 ms    P Axis 60 degrees    R Axis 67 degrees    T Axis 57 degrees    Diagnosis       Normal sinus rhythmNormal ECGNo previous ECGs available       EKG  EKG interpreted by me. Normal sinus rhythm EKGs are available, and is unremarkable. No significant ST elevation or depression. RADIOLOGY  X-RAYS:  I have reviewed radiologic plain film image(s). ALL OTHER NON-PLAIN FILM IMAGES SUCH AS CT, ULTRASOUND AND MRI HAVE BEEN READ BY THE RADIOLOGIST. XR CHEST PORTABLE   Preliminary Result   1. Hazy bibasilar airspace disease, right greater than left, representing   either atelectasis, pneumonia, or asymmetric edema. 2. Small bilateral pleural effusions. 3. Stable cardiomegaly. CTA PULMONARY W CONTRAST    (Results Pending)          ED COURSE/MDM  Patient seen and evaluated. Chest x-ray shows some bibasilar airspace disease were greater than left possibly edema she does have cardiomegaly and an elevated BNP at 1000 she has troponin of 0.02 no history of elevated troponins she denies any chest pain and is unremarkable for any heart strain but also get a CT scan of the chest rule out a PE. Hospitalist 's [address] [place] [address] for admission. Also the CT scan follow up with her. He does not appear toxic but she does seem short of breath.   She is more comfortable 2 L oxygen

## 2018-09-26 NOTE — PROGRESS NOTES
4 Eyes Skin Assessment     The patient is being assess for  Admission    I agree that 2 RN's have performed a thorough Head to Toe Skin Assessment on the patient. ALL assessment sites listed below have been assessed. Areas assessed by both nurses: Corinne/Erica   [x]   Head, Face, and Ears   [x]   Shoulders, Back, and Chest  [x]   Arms, Elbows, and Hands   [x]   Coccyx, Sacrum, and Ischum  [x]   Legs, Feet, and Heels        Does the Patient have Skin Breakdown?   No         Vineet Prevention initiated:  No   Wound Care Orders initiated:  CRISTO Reddy consulted for Pressure Injury (Stage 3,4, Unstageable, DTI, NWPT, and Complex wounds):  No      Nurse 1 eSignature: Electronically signed by Viktoriya Panda RN on 9/26/18 at [address] PM    **SHARE this note so that the co-signing nurse is able to place an eSignature**    Nurse 2 eSignature: Electronically signed by Enrico Mitchell RN on 9/30/18 at 11:49 AM

## 2018-09-26 NOTE — PROGRESS NOTES
1    Respiratory Pattern: Regular Pattern; RR 8-20 = 0    Breath Sounds: Diminshed bilaterally and/or crackles = 2    Sputum   ,  , Sputum How Obtained: None  Cough: Strong, spontaneous, non-productive = 0    Vital Signs   BP (!) 201/107   Pulse 109   Temp 98.6 °F (37 °C) (Oral)   Resp 20   Ht 5' 6\" (1.676 m)   Wt 250 lb (113.4 kg)   SpO2 96%   BMI 40.35 kg/m²   SPO2 (COPD values may differ): 88-89% on room air or greater than 92% on FiO2 28- 35% = 2    Peak Flow (asthma only): not applicable = 0    RSI: 0-4 = See once and convert to home regimen or discontinue        Plan       Goals: medication delivery, mobilize retained secretions, volume expansion and improve oxygenation    Patient/caregiver was educated on the proper method of use for Respiratory Care Devices:  Yes      Level of patient/caregiver understanding able to:   [] Verbalize understanding   [] Demonstrate understanding       [] Teach back        [] Needs reinforcement       []  No available caregiver               []  Other:     Response to education:  Very Good     Is patient being placed on Home Treatment Regimen? Yes     Does the patient have everything they need prior to discharge? NA     Comments: pt assessed      Plan of Care: home reg    Electronically signed by Kenia Brown RCP on 9/26/2018 at 4:06 PM    Respiratory Protocol Guidelines     1. Assessment and treatment by Respiratory Therapy will be initiated for medication and therapeutic interventions upon initiation of aerosolized medication. 2. Physician will be contacted for respiratory rate (RR) greater than 35 breaths per minute. Therapy will be held for heart rate (HR) greater than 140 beats per minute, pending direction from physician. 3. Bronchodilators will be administered via Metered Dose Inhaler (MDI) with spacer when the following criteria are met:  a.  Alert and cooperative     b. HR < 140 bpm  c. RR < 30 bpm                d. Can demonstrate a 23 second inspiratory

## 2018-09-27 LAB
ANION GAP SERPL CALCULATED.3IONS-SCNC: 8 MMOL/L (ref 3–16)
BASOPHILS ABSOLUTE: 0 K/UL (ref 0–0.2)
BASOPHILS RELATIVE PERCENT: 0.1 %
BUN BLDV-MCNC: 27 MG/DL (ref 7–20)
CALCIUM SERPL-MCNC: 8 MG/DL (ref 8.3–10.6)
CHLORIDE BLD-SCNC: 101 MMOL/L (ref 99–110)
CO2: 31 MMOL/L (ref 21–32)
CREAT SERPL-MCNC: 1.1 MG/DL (ref 0.6–1.2)
EOSINOPHILS ABSOLUTE: 0 K/UL (ref 0–0.6)
EOSINOPHILS RELATIVE PERCENT: 0 %
ESTIMATED AVERAGE GLUCOSE: 139.9 MG/DL
GFR AFRICAN AMERICAN: 58
GFR NON-AFRICAN AMERICAN: 48
GLUCOSE BLD-MCNC: 116 MG/DL (ref 70–99)
GLUCOSE BLD-MCNC: 116 MG/DL (ref 70–99)
GLUCOSE BLD-MCNC: 136 MG/DL (ref 70–99)
GLUCOSE BLD-MCNC: 149 MG/DL (ref 70–99)
GLUCOSE BLD-MCNC: 186 MG/DL (ref 70–99)
HBA1C MFR BLD: 6.5 %
HCT VFR BLD CALC: 28 % (ref 36–48)
HEMOGLOBIN: 9.1 G/DL (ref 12–16)
LV EF: 58 %
LVEF MODALITY: NORMAL
LYMPHOCYTES ABSOLUTE: 0.8 K/UL (ref 1–5.1)
LYMPHOCYTES RELATIVE PERCENT: 7.1 %
MAGNESIUM: 2.3 MG/DL (ref 1.8–2.4)
MCH RBC QN AUTO: 26.7 PG (ref 26–34)
MCHC RBC AUTO-ENTMCNC: 32.5 G/DL (ref 31–36)
MCV RBC AUTO: 82.2 FL (ref 80–100)
MONOCYTES ABSOLUTE: 0.5 K/UL (ref 0–1.3)
MONOCYTES RELATIVE PERCENT: 4.9 %
NEUTROPHILS ABSOLUTE: 9.4 K/UL (ref 1.7–7.7)
NEUTROPHILS RELATIVE PERCENT: 87.9 %
PDW BLD-RTO: 15.6 % (ref 12.4–15.4)
PERFORMED ON: ABNORMAL
PLATELET # BLD: 222 K/UL (ref 135–450)
PMV BLD AUTO: 8.3 FL (ref 5–10.5)
POTASSIUM SERPL-SCNC: 4.6 MMOL/L (ref 3.5–5.1)
PRO-BNP: 2156 PG/ML (ref 0–449)
RBC # BLD: 3.41 M/UL (ref 4–5.2)
SODIUM BLD-SCNC: 140 MMOL/L (ref 136–145)
T4 FREE: 1.2 NG/DL (ref 0.9–1.8)
TSH SERPL DL<=0.05 MIU/L-ACNC: 1.41 UIU/ML (ref 0.27–4.2)
WBC # BLD: 10.7 K/UL (ref 4–11)

## 2018-09-27 PROCEDURE — 6360000002 HC RX W HCPCS: Performed by: INTERNAL MEDICINE

## 2018-09-27 PROCEDURE — 83880 ASSAY OF NATRIURETIC PEPTIDE: CPT

## 2018-09-27 PROCEDURE — 94660 CPAP INITIATION&MGMT: CPT

## 2018-09-27 PROCEDURE — 6370000000 HC RX 637 (ALT 250 FOR IP): Performed by: INTERNAL MEDICINE

## 2018-09-27 PROCEDURE — 94761 N-INVAS EAR/PLS OXIMETRY MLT: CPT

## 2018-09-27 PROCEDURE — 83735 ASSAY OF MAGNESIUM: CPT

## 2018-09-27 PROCEDURE — 94668 MNPJ CHEST WALL SBSQ: CPT

## 2018-09-27 PROCEDURE — 85025 COMPLETE CBC W/AUTO DIFF WBC: CPT

## 2018-09-27 PROCEDURE — 80048 BASIC METABOLIC PNL TOTAL CA: CPT

## 2018-09-27 PROCEDURE — 94640 AIRWAY INHALATION TREATMENT: CPT

## 2018-09-27 PROCEDURE — 2580000003 HC RX 258: Performed by: INTERNAL MEDICINE

## 2018-09-27 PROCEDURE — 93970 EXTREMITY STUDY: CPT

## 2018-09-27 PROCEDURE — 2700000000 HC OXYGEN THERAPY PER DAY

## 2018-09-27 PROCEDURE — 94664 DEMO&/EVAL PT USE INHALER: CPT

## 2018-09-27 PROCEDURE — 1200000000 HC SEMI PRIVATE

## 2018-09-27 PROCEDURE — 93306 TTE W/DOPPLER COMPLETE: CPT | Performed by: INTERNAL MEDICINE

## 2018-09-27 PROCEDURE — 36415 COLL VENOUS BLD VENIPUNCTURE: CPT

## 2018-09-27 PROCEDURE — 99223 1ST HOSP IP/OBS HIGH 75: CPT | Performed by: INTERNAL MEDICINE

## 2018-09-27 PROCEDURE — 2580000003 HC RX 258: Performed by: NURSE PRACTITIONER

## 2018-09-27 RX ORDER — FUROSEMIDE 10 MG/ML
40 INJECTION INTRAMUSCULAR; INTRAVENOUS 2 TIMES DAILY
Status: DISCONTINUED | OUTPATIENT
Start: 2018-09-27 | End: 2018-09-30

## 2018-09-27 RX ADMIN — MONTELUKAST SODIUM 10 MG: 10 TABLET, COATED ORAL at 21:50

## 2018-09-27 RX ADMIN — Medication 10 ML: at 09:03

## 2018-09-27 RX ADMIN — METOPROLOL TARTRATE 25 MG: 25 TABLET, FILM COATED ORAL at 21:50

## 2018-09-27 RX ADMIN — DULOXETINE HYDROCHLORIDE 60 MG: 60 CAPSULE, DELAYED RELEASE ORAL at 08:58

## 2018-09-27 RX ADMIN — METOPROLOL TARTRATE 25 MG: 25 TABLET, FILM COATED ORAL at 12:30

## 2018-09-27 RX ADMIN — SODIUM CHLORIDE, PRESERVATIVE FREE 10 ML: 5 INJECTION INTRAVENOUS at 09:02

## 2018-09-27 RX ADMIN — ASPIRIN 81 MG: 81 TABLET ORAL at 08:58

## 2018-09-27 RX ADMIN — CARBIDOPA AND LEVODOPA 1 TABLET: 25; 100 TABLET ORAL at 21:50

## 2018-09-27 RX ADMIN — INSULIN LISPRO 1 UNITS: 100 INJECTION, SOLUTION INTRAVENOUS; SUBCUTANEOUS at 22:08

## 2018-09-27 RX ADMIN — FUROSEMIDE 40 MG: 10 INJECTION, SOLUTION INTRAMUSCULAR; INTRAVENOUS at 21:50

## 2018-09-27 RX ADMIN — Medication 10 ML: at 21:52

## 2018-09-27 RX ADMIN — FUROSEMIDE 40 MG: 10 INJECTION, SOLUTION INTRAMUSCULAR; INTRAVENOUS at 15:30

## 2018-09-27 RX ADMIN — Medication 1 PUFF: at 12:20

## 2018-09-27 RX ADMIN — SODIUM CHLORIDE, PRESERVATIVE FREE 10 ML: 5 INJECTION INTRAVENOUS at 21:52

## 2018-09-27 RX ADMIN — ENOXAPARIN SODIUM 120 MG: 120 INJECTION SUBCUTANEOUS at 23:05

## 2018-09-27 RX ADMIN — LOSARTAN POTASSIUM 100 MG: 100 TABLET, FILM COATED ORAL at 08:57

## 2018-09-27 RX ADMIN — OLODATEROL RESPIMAT INHALATION SPRAY 2 PUFF: 2.5 SPRAY, METERED RESPIRATORY (INHALATION) at 09:00

## 2018-09-27 RX ADMIN — CARBIDOPA AND LEVODOPA 1 TABLET: 25; 100 TABLET ORAL at 08:58

## 2018-09-27 RX ADMIN — LAMOTRIGINE 25 MG: 25 TABLET ORAL at 08:58

## 2018-09-27 RX ADMIN — IPRATROPIUM BROMIDE AND ALBUTEROL SULFATE 3 ML: .5; 3 SOLUTION RESPIRATORY (INHALATION) at 20:10

## 2018-09-27 RX ADMIN — ATORVASTATIN CALCIUM 20 MG: 40 TABLET, FILM COATED ORAL at 08:57

## 2018-09-27 RX ADMIN — NITROGLYCERIN 0.5 INCH: 20 OINTMENT TOPICAL at 04:03

## 2018-09-27 RX ADMIN — LAMOTRIGINE 25 MG: 25 TABLET ORAL at 21:50

## 2018-09-27 RX ADMIN — Medication 1 PUFF: at 20:10

## 2018-09-27 RX ADMIN — GABAPENTIN 100 MG: 100 CAPSULE ORAL at 21:50

## 2018-09-27 RX ADMIN — CARBIDOPA AND LEVODOPA 1 TABLET: 25; 100 TABLET ORAL at 15:37

## 2018-09-27 RX ADMIN — IPRATROPIUM BROMIDE AND ALBUTEROL SULFATE 3 ML: .5; 3 SOLUTION RESPIRATORY (INHALATION) at 12:19

## 2018-09-27 RX ADMIN — ENOXAPARIN SODIUM 40 MG: 40 INJECTION SUBCUTANEOUS at 08:58

## 2018-09-27 RX ADMIN — IPRATROPIUM BROMIDE AND ALBUTEROL SULFATE 3 ML: .5; 3 SOLUTION RESPIRATORY (INHALATION) at 16:24

## 2018-09-27 NOTE — PROGRESS NOTES
09/27/18 0416   NIV Type   $NIV $Daily Charge   Equipment Type V60   Mode CPAP   Mask Type Full face mask   Mask Size Medium   Settings/Measurements   Comfort Level Good   Using Accessory Muscles No   CPAP 10 cmH2O   Resp 20   SpO2 98   FiO2  30 %   Vt Exhaled 345 mL   Mask Leak (lpm) 0 lpm   Skin Protection for O2 Device Yes   Breath Sounds   Right Upper Lobe Diminished   Right Middle Lobe Diminished   Right Lower Lobe Diminished   Left Upper Lobe Diminished   Left Lower Lobe Diminished

## 2018-09-27 NOTE — PROGRESS NOTES
overnight     Parkinson's with resting tremor-continue with Sinemet     Hypertension-continue home medications losartan     Hyperlipidemia-statin      asthma-no  Exacerbation-continue when necessary inhalers, Singulair       DVT Prophylaxis: Lovenox  Diet: DIET CARDIAC; Carb Control: 4 carb choices (60 gms)/meal  Code Status: Full Code    PT/OT Eval Status: May need/ ordered     Dispo - 2-3 days    Roxanne Luque MD

## 2018-09-27 NOTE — PLAN OF CARE
Problem: OXYGENATION/RESPIRATORY FUNCTION  Goal: Patient will maintain patent airway    Intervention: POSITION PATIENT FOR MAXIMUM VENTILATORY EFFICIENCY  Patient sitting in high fowlers position     Patient's EF (Ejection Fraction) is greater than 40%    Patient has a past medical history of Anemia, unspecified; Asthma; Baker's cyst; Blind left eye; Carotid artery stenoses; Carotid stenosis; Cervical spinal cord compression ([facility] [address] 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-sided sciatica; CKD (chronic kidney disease) stage 3, GFR 30-59 ml/min; Community acquired pneumonia of left lower lobe of lung ([facility] [address] CRI; Detached retina, left; Diverticulosis; DJD (degenerative joint disease); Edema; Fatty liver; GERD (gastroesophageal reflux disease); Herniated lumbar intervertebral disc; Hyperlipidemia; Hypertension; Hypothyroid; Morbid obesity with BMI of 40.0-44.9, adult ([facility] [address] BETSY treated with BiPAP; Restless leg syndrome; Restless legs syndrome; Sleep apnea; Tremor, essential; and Type II or unspecified type diabetes mellitus without mention of complication, not stated as uncontrolled. Comorbidities reviewed and education provided. Patient and family's stated goal of care: reduce shortness of breath prior to discharge    Patient's current functional capacity:  Slight limitation of physical activity. Comfortable at rest. Ordinary physical activity results in fatigue, palpitation, dyspnea. Pt sitting in bed at this time on 3 L O2. Pt with complaints of shortness of breath. Pt with pitting lower extremity edema.  Patient's weights and intake/output reviewed:    Patient Vitals for the past 96 hrs (Last 3 readings):   Weight   09/27/18 0703 261 lb 4.8 oz (118.5 kg)   09/26/18 1922 268 lb (121.6 kg)   09/26/18 1009 250 lb (113.4 kg)       Intake/Output Summary (Last 24 hours) at 09/27/18 1851  Last data filed at 09/27/18 1751   Gross per 24 hour   Intake             1680 ml   Output             2450 ml   Net

## 2018-09-27 NOTE — PROGRESS NOTES
Physical Therapy  Facility/Department: [facility] A2 CARD TELEMETRY  Attempt/Hold  NAME: Britney James  : 1940  MRN: [id number]    Spoke with RN who advises holding PT eval at this time d/t pt fatigue 2/2 multiple procedures today. Will re-attempt PT eval tomorrow.     Ritika Campbell, PT

## 2018-09-27 NOTE — PLAN OF CARE
Problem: OXYGENATION/RESPIRATORY FUNCTION  Goal: Patient will maintain patent airway  Outcome: Ongoing  Patient's EF (Ejection Fraction) is greater than 40%    Patient has a past medical history of Anemia, unspecified; Asthma; Baker's cyst; Blind left eye; Carotid artery stenoses; Carotid stenosis; Cervical spinal cord compression ([facility] [address] 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-sided sciatica; CKD (chronic kidney disease) stage 3, GFR 30-59 ml/min; Community acquired pneumonia of left lower lobe of lung ([facility] [address] CRI; Detached retina, left; Diverticulosis; DJD (degenerative joint disease); Edema; Fatty liver; GERD (gastroesophageal reflux disease); Herniated lumbar intervertebral disc; Hyperlipidemia; Hypertension; Hypothyroid; Morbid obesity with BMI of 40.0-44.9, adult ([facility] [address] BETSY treated with BiPAP; Restless leg syndrome; Restless legs syndrome; Sleep apnea; Tremor, essential; and Type II or unspecified type diabetes mellitus without mention of complication, not stated as uncontrolled. Comorbidities reviewed and education provided. Patient and family's stated goal of care: reduce shortness of breath prior to discharge    Patient's current functional capacity:  Slight limitation of physical activity. Comfortable at rest. Ordinary physical activity results in fatigue, palpitation, dyspnea. Pt resting in bed at this time on CPAP. Pt denies shortness of breath. Pt with pitting lower extremity edema. Patient's weights and intake/output reviewed:    Patient Vitals for the past 96 hrs (Last 3 readings):   Weight   09/26/18 1009 250 lb (113.4 kg)       Intake/Output Summary (Last 24 hours) at 09/27/18 0125  Last data filed at 09/26/18 2230   Gross per 24 hour   Intake              540 ml   Output             1775 ml   Net            -1235 ml       Patient provided with education on CHF signs/symptoms, causes, discharge medications, daily weights, low sodium diet, activity, and follow-up.  Notified patient to call the

## 2018-09-27 NOTE — PROGRESS NOTES
According to day shift nurse patient is not a canidate for a PICC line and that patient may need to get an IJ placed so CT scan can be completed.

## 2018-09-28 ENCOUNTER — APPOINTMENT (OUTPATIENT)
Dept: NUCLEAR MEDICINE | Age: 78
DRG: 291 | End: 2018-09-28
Payer: MEDICARE

## 2018-09-28 LAB
ANION GAP SERPL CALCULATED.3IONS-SCNC: 7 MMOL/L (ref 3–16)
BUN BLDV-MCNC: 32 MG/DL (ref 7–20)
CALCIUM SERPL-MCNC: 8.8 MG/DL (ref 8.3–10.6)
CHLORIDE BLD-SCNC: 96 MMOL/L (ref 99–110)
CO2: 38 MMOL/L (ref 21–32)
CREAT SERPL-MCNC: 1.2 MG/DL (ref 0.6–1.2)
EKG ATRIAL RATE: 95 BPM
EKG DIAGNOSIS: NORMAL
EKG P AXIS: 53 DEGREES
EKG P-R INTERVAL: 144 MS
EKG Q-T INTERVAL: 358 MS
EKG QRS DURATION: 92 MS
EKG QTC CALCULATION (BAZETT): 449 MS
EKG R AXIS: 60 DEGREES
EKG T AXIS: 76 DEGREES
EKG VENTRICULAR RATE: 95 BPM
GFR AFRICAN AMERICAN: 53
GFR NON-AFRICAN AMERICAN: 44
GLUCOSE BLD-MCNC: 108 MG/DL (ref 70–99)
GLUCOSE BLD-MCNC: 109 MG/DL (ref 70–99)
GLUCOSE BLD-MCNC: 121 MG/DL (ref 70–99)
GLUCOSE BLD-MCNC: 141 MG/DL (ref 70–99)
GLUCOSE BLD-MCNC: 159 MG/DL (ref 70–99)
MAGNESIUM: 2 MG/DL (ref 1.8–2.4)
PERFORMED ON: ABNORMAL
POTASSIUM SERPL-SCNC: 3.9 MMOL/L (ref 3.5–5.1)
SODIUM BLD-SCNC: 141 MMOL/L (ref 136–145)

## 2018-09-28 PROCEDURE — 94640 AIRWAY INHALATION TREATMENT: CPT

## 2018-09-28 PROCEDURE — 2580000003 HC RX 258: Performed by: NURSE PRACTITIONER

## 2018-09-28 PROCEDURE — 97530 THERAPEUTIC ACTIVITIES: CPT

## 2018-09-28 PROCEDURE — 6360000002 HC RX W HCPCS: Performed by: INTERNAL MEDICINE

## 2018-09-28 PROCEDURE — G8979 MOBILITY GOAL STATUS: HCPCS

## 2018-09-28 PROCEDURE — 2700000000 HC OXYGEN THERAPY PER DAY

## 2018-09-28 PROCEDURE — G8978 MOBILITY CURRENT STATUS: HCPCS

## 2018-09-28 PROCEDURE — 6370000000 HC RX 637 (ALT 250 FOR IP): Performed by: INTERNAL MEDICINE

## 2018-09-28 PROCEDURE — 78582 LUNG VENTILAT&PERFUS IMAGING: CPT

## 2018-09-28 PROCEDURE — 3430000000 HC RX DIAGNOSTIC RADIOPHARMACEUTICAL: Performed by: INTERNAL MEDICINE

## 2018-09-28 PROCEDURE — 80048 BASIC METABOLIC PNL TOTAL CA: CPT

## 2018-09-28 PROCEDURE — 2580000003 HC RX 258: Performed by: INTERNAL MEDICINE

## 2018-09-28 PROCEDURE — 97116 GAIT TRAINING THERAPY: CPT

## 2018-09-28 PROCEDURE — 94761 N-INVAS EAR/PLS OXIMETRY MLT: CPT

## 2018-09-28 PROCEDURE — 99233 SBSQ HOSP IP/OBS HIGH 50: CPT | Performed by: INTERNAL MEDICINE

## 2018-09-28 PROCEDURE — 83735 ASSAY OF MAGNESIUM: CPT

## 2018-09-28 PROCEDURE — A9558 XE133 XENON 10MCI: HCPCS | Performed by: INTERNAL MEDICINE

## 2018-09-28 PROCEDURE — 36415 COLL VENOUS BLD VENIPUNCTURE: CPT

## 2018-09-28 PROCEDURE — 1200000000 HC SEMI PRIVATE

## 2018-09-28 PROCEDURE — 94660 CPAP INITIATION&MGMT: CPT

## 2018-09-28 PROCEDURE — 97162 PT EVAL MOD COMPLEX 30 MIN: CPT

## 2018-09-28 PROCEDURE — A9540 TC99M MAA: HCPCS | Performed by: INTERNAL MEDICINE

## 2018-09-28 RX ORDER — PROPAFENONE HYDROCHLORIDE 150 MG/1
150 TABLET, FILM COATED ORAL EVERY 8 HOURS SCHEDULED
Status: DISCONTINUED | OUTPATIENT
Start: 2018-09-28 | End: 2018-10-03 | Stop reason: HOSPADM

## 2018-09-28 RX ORDER — METOLAZONE 2.5 MG/1
2.5 TABLET ORAL DAILY
Status: DISCONTINUED | OUTPATIENT
Start: 2018-09-28 | End: 2018-09-29

## 2018-09-28 RX ORDER — METOPROLOL TARTRATE 50 MG/1
50 TABLET, FILM COATED ORAL 2 TIMES DAILY
Status: DISCONTINUED | OUTPATIENT
Start: 2018-09-28 | End: 2018-10-01

## 2018-09-28 RX ORDER — SPIRONOLACTONE 25 MG/1
25 TABLET ORAL DAILY
Status: DISCONTINUED | OUTPATIENT
Start: 2018-09-28 | End: 2018-10-01

## 2018-09-28 RX ADMIN — INSULIN LISPRO 1 UNITS: 100 INJECTION, SOLUTION INTRAVENOUS; SUBCUTANEOUS at 23:38

## 2018-09-28 RX ADMIN — ENOXAPARIN SODIUM 80 MG: 80 INJECTION SUBCUTANEOUS at 14:46

## 2018-09-28 RX ADMIN — SODIUM CHLORIDE, PRESERVATIVE FREE 10 ML: 5 INJECTION INTRAVENOUS at 23:35

## 2018-09-28 RX ADMIN — CARBIDOPA AND LEVODOPA 1 TABLET: 25; 100 TABLET ORAL at 09:11

## 2018-09-28 RX ADMIN — FUROSEMIDE 40 MG: 10 INJECTION, SOLUTION INTRAMUSCULAR; INTRAVENOUS at 23:35

## 2018-09-28 RX ADMIN — LOSARTAN POTASSIUM 100 MG: 100 TABLET, FILM COATED ORAL at 09:12

## 2018-09-28 RX ADMIN — Medication 1 PUFF: at 07:39

## 2018-09-28 RX ADMIN — LAMOTRIGINE 25 MG: 25 TABLET ORAL at 23:34

## 2018-09-28 RX ADMIN — SODIUM CHLORIDE, PRESERVATIVE FREE 10 ML: 5 INJECTION INTRAVENOUS at 09:12

## 2018-09-28 RX ADMIN — Medication 10 ML: at 23:35

## 2018-09-28 RX ADMIN — Medication 6 MILLICURIE: at 13:45

## 2018-09-28 RX ADMIN — PROPAFENONE HYDROCHLORIDE 150 MG: 150 TABLET, FILM COATED ORAL at 14:46

## 2018-09-28 RX ADMIN — ASPIRIN 81 MG: 81 TABLET ORAL at 09:11

## 2018-09-28 RX ADMIN — METOLAZONE 2.5 MG: 2.5 TABLET ORAL at 14:47

## 2018-09-28 RX ADMIN — PROPAFENONE HYDROCHLORIDE 150 MG: 150 TABLET, FILM COATED ORAL at 23:35

## 2018-09-28 RX ADMIN — IPRATROPIUM BROMIDE AND ALBUTEROL SULFATE 3 ML: .5; 3 SOLUTION RESPIRATORY (INHALATION) at 07:39

## 2018-09-28 RX ADMIN — METOPROLOL TARTRATE 25 MG: 25 TABLET, FILM COATED ORAL at 09:12

## 2018-09-28 RX ADMIN — SPIRONOLACTONE 25 MG: 25 TABLET ORAL at 13:00

## 2018-09-28 RX ADMIN — LAMOTRIGINE 25 MG: 25 TABLET ORAL at 09:12

## 2018-09-28 RX ADMIN — ENOXAPARIN SODIUM 40 MG: 40 INJECTION SUBCUTANEOUS at 09:11

## 2018-09-28 RX ADMIN — METOPROLOL TARTRATE 50 MG: 50 TABLET ORAL at 23:41

## 2018-09-28 RX ADMIN — ATORVASTATIN CALCIUM 20 MG: 40 TABLET, FILM COATED ORAL at 09:11

## 2018-09-28 RX ADMIN — IPRATROPIUM BROMIDE AND ALBUTEROL SULFATE 3 ML: .5; 3 SOLUTION RESPIRATORY (INHALATION) at 11:59

## 2018-09-28 RX ADMIN — IPRATROPIUM BROMIDE AND ALBUTEROL SULFATE 3 ML: .5; 3 SOLUTION RESPIRATORY (INHALATION) at 15:29

## 2018-09-28 RX ADMIN — GABAPENTIN 100 MG: 100 CAPSULE ORAL at 23:35

## 2018-09-28 RX ADMIN — MONTELUKAST SODIUM 10 MG: 10 TABLET, COATED ORAL at 23:34

## 2018-09-28 RX ADMIN — Medication 15.5 MILLICURIE: at 13:45

## 2018-09-28 RX ADMIN — ENOXAPARIN SODIUM 120 MG: 120 INJECTION SUBCUTANEOUS at 23:36

## 2018-09-28 RX ADMIN — DULOXETINE HYDROCHLORIDE 60 MG: 60 CAPSULE, DELAYED RELEASE ORAL at 09:12

## 2018-09-28 RX ADMIN — FUROSEMIDE 40 MG: 10 INJECTION, SOLUTION INTRAMUSCULAR; INTRAVENOUS at 09:12

## 2018-09-28 RX ADMIN — CARBIDOPA AND LEVODOPA 1 TABLET: 25; 100 TABLET ORAL at 23:34

## 2018-09-28 RX ADMIN — INSULIN LISPRO 1 UNITS: 100 INJECTION, SOLUTION INTRAVENOUS; SUBCUTANEOUS at 18:08

## 2018-09-28 RX ADMIN — Medication 10 ML: at 09:13

## 2018-09-28 RX ADMIN — CARBIDOPA AND LEVODOPA 1 TABLET: 25; 100 TABLET ORAL at 14:46

## 2018-09-28 RX ADMIN — OLODATEROL RESPIMAT INHALATION SPRAY 2 PUFF: 2.5 SPRAY, METERED RESPIRATORY (INHALATION) at 07:39

## 2018-09-28 NOTE — PROGRESS NOTES
Secure message sent to Dr. Marlys Duffy; Patient's HR is sustaining in 140's and has flipped into A.fib. She has a history of A.fib. Patient is asymptomatic. Please advise. Thank you!

## 2018-09-28 NOTE — PLAN OF CARE
Problem: OXYGENATION/RESPIRATORY FUNCTION  Goal: Patient will maintain patent airway  Outcome: Ongoing  Patient's EF (Ejection Fraction) is greater than 40%    Patient has a past medical history of Anemia, unspecified; Asthma; Baker's cyst; Blind left eye; Carotid artery stenoses; Carotid stenosis; Cervical spinal cord compression ([facility] [address] 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-sided sciatica; CKD (chronic kidney disease) stage 3, GFR 30-59 ml/min; Community acquired pneumonia of left lower lobe of lung ([facility] [address] CRI; Detached retina, left; Diverticulosis; DJD (degenerative joint disease); Edema; Fatty liver; GERD (gastroesophageal reflux disease); Herniated lumbar intervertebral disc; Hyperlipidemia; Hypertension; Hypothyroid; Morbid obesity with BMI of 40.0-44.9, adult ([facility] [address] BETSY treated with BiPAP; Restless leg syndrome; Restless legs syndrome; Sleep apnea; Tremor, essential; and Type II or unspecified type diabetes mellitus without mention of complication, not stated as uncontrolled. Comorbidities reviewed and education provided. Patient and family's stated goal of care: be more comfortable prior to discharge    Patient's current functional capacity:  Slight limitation of physical activity. Comfortable at rest. Ordinary physical activity results in fatigue, palpitation, dyspnea. Pt resting in bed at this time on BiPAP. Pt denies shortness of breath. Pt with pitting lower extremity edema.  Patient's weights and intake/output reviewed:    Patient Vitals for the past 96 hrs (Last 3 readings):   Weight   09/27/18 0703 261 lb 4.8 oz (118.5 kg)   09/26/18 1922 268 lb (121.6 kg)   09/26/18 1009 250 lb (113.4 kg)       Intake/Output Summary (Last 24 hours) at 09/27/18 2349  Last data filed at 09/27/18 2302   Gross per 24 hour   Intake             1990 ml   Output             2700 ml   Net             -710 ml       Patient provided with education on CHF signs/symptoms, causes, discharge medications, daily weights, low

## 2018-09-28 NOTE — PLAN OF CARE
Problem: Serum Glucose Level - Abnormal:  Goal: Ability to maintain appropriate glucose levels will improve  Ability to maintain appropriate glucose levels will improve   Outcome: Ongoing  Diabetes education provided today:    Foot care: advised to wash feet daily, pat dry and apply lotion at night, avoiding between toes. Need to look at feet daily and report to a physician any signs of inflammation or skin damage. Discussed diabetes shoes and socks. Carbs: good carbs and bad carbs, importance of carb counting, incorporation of protein with each meal to reduce Glycemic index, importance of portions, Carb/insulin ratio. Fats: Good fats and bad fats, meal planning and supplements.

## 2018-09-28 NOTE — PROGRESS NOTES
Physical Therapy    Facility/Department: [facility] A2 CARD TELEMETRY  Initial Assessment    NAME: Theodora Johnson  : 1940  MRN: [id number]    Date of Service: 2018    Discharge Recommendations:  Continue to assess pending progress, 24 hour supervision or assist   PT Equipment Recommendations  Equipment Needed: No    Patient Diagnosis(es): The primary encounter diagnosis was Acute congestive heart failure, unspecified heart failure type ([facility] [address] Diagnoses of Elevated troponin and Chest pain, unspecified type were also pertinent to this visit. has a past medical history of Anemia, unspecified; Asthma; Baker's cyst; Blind left eye; Carotid artery stenoses; Carotid stenosis; Cervical spinal cord compression ([address] 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-sided sciatica; CKD (chronic kidney disease) stage 3, GFR 30-59 ml/min; Community acquired pneumonia of left lower lobe of lung ([address] CRI; Detached retina, left; Diverticulosis; DJD (degenerative joint disease); Edema; Fatty liver; GERD (gastroesophageal reflux disease); Herniated lumbar intervertebral disc; Hyperlipidemia; Hypertension; Hypothyroid; Morbid obesity with BMI of 40.0-44.9, adult ([address] BETSY treated with BiPAP; Restless leg syndrome; Restless legs syndrome; Sleep apnea; Tremor, essential; and Type II or unspecified type diabetes mellitus without mention of complication, not stated as uncontrolled. has a past surgical history that includes Tonsillectomy; Hysterectomy; Cholecystectomy; Tubal ligation; knee surgery; hernia repair; eye surgery; Cataract removal; vitrectomy; Corneal transplant; Carotid endarterectomy; Appendectomy; cervical fusion (2010); and shoulder surgery.     Restrictions     Vision/Hearing  Vision: Impaired  Vision Exceptions:  (blind in left eye)  Hearing: Within functional limits     Subjective  General  Chart Reviewed: Yes  Patient assessed for rehabilitation services?: Yes  Additional Pertinent Hx: CHF and hx of pull ups with sba, continent of urine and independent with toileting hygiene  Ambulation  Ambulation?: Yes  Ambulation 1  Surface: level tile  Device: Rolling Walker  Assistance: Stand by assistance  Quality of Gait: slow jamaal without LOB, slight ue tremors towards end of amb with fatigue but without LOB  Distance: about 120 feet  Stairs/Curb  Stairs?: No     Balance  Posture: Fair  Sitting - Static: Good  Sitting - Dynamic: Good  Standing - Static: Fair;+  Standing - Dynamic: Fair        Assessment   Body structures, Functions, Activity limitations: Decreased functional mobility ; Decreased strength;Decreased endurance;Decreased balance  Assessment: 70year old female with hx of parkinsons presents to hospital with CHF with decreaesed endurance and gait stability. Pt tolerated eval and amb in halls with use of wheeled walker with sba with heart rate maintained in good range. Recomend follow up PT visit while in acute care to further assess and treat balance and gait impairements in order to improve transition to home. Antipate discharge to home with assist as needed from spouse. Treatment Diagnosis: gait impairement, endurance  Specific instructions for Next Treatment: progress with balance and gait as able  Prognosis: Good  Decision Making: Medium Complexity  Barriers to Learning: blind left eye  REQUIRES PT FOLLOW UP: Yes  Activity Tolerance  Activity Tolerance: Patient Tolerated treatment well  Activity Tolerance: BP after amb 155/77 sitting and HR 92. Unable to obtain spo2 reading after amb on room air due to equipment error however pt asymptomatic and without acute distress. Nurse informed. Nurse reports HR while walking stayed in a good range.          Plan   Plan  Times per week: 3-5  Times per day: Daily  Plan weeks: 1  Specific instructions for Next Treatment: progress with balance and gait as able  Current Treatment Recommendations: Strengthening, ROM, Balance Training, Transfer Training, Gait

## 2018-09-28 NOTE — PROGRESS NOTES
VSS - afebrile. Pt is alert and oriented x 4 with no history of falls. Assessment completed as charted. Bed is in lowest position with 2/4 bed rails raised. Bed alarm turned on, wheels locked, and call light within reach. Patient wearing non-skid socks and verbalizes understanding to call out for assistance. No further requests at this time. Will continue to monitor patient.     Vitals:    09/27/18 1917 09/27/18 2143 09/27/18 2259 09/27/18 2330   BP: (!) 147/66  (!) 169/73    Pulse: 106  103    Resp: 16 17 18 19   Temp: 98.1 °F (36.7 °C)  97.8 °F (36.6 °C)    TempSrc: Oral  Oral    SpO2: 97%  96%    Weight:       Height:         Electronically signed by Patricio Gant RN on 9/27/2018 at 11:55 PM

## 2018-09-28 NOTE — PLAN OF CARE
Problem: OXYGENATION/RESPIRATORY FUNCTION  Goal: Patient will maintain patent airway  Outcome: Ongoing  Patient's EF (Ejection Fraction) is greater than 40%    Patient has a past medical history of Anemia, unspecified; Asthma; Baker's cyst; Blind left eye; Carotid artery stenoses; Carotid stenosis; Cervical spinal cord compression ([facility] [address] 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-sided sciatica; CKD (chronic kidney disease) stage 3, GFR 30-59 ml/min; Community acquired pneumonia of left lower lobe of lung ([facility] [address] CRI; Detached retina, left; Diverticulosis; DJD (degenerative joint disease); Edema; Fatty liver; GERD (gastroesophageal reflux disease); Herniated lumbar intervertebral disc; Hyperlipidemia; Hypertension; Hypothyroid; Morbid obesity with BMI of 40.0-44.9, adult ([facility] [address] BETSY treated with BiPAP; Restless leg syndrome; Restless legs syndrome; Sleep apnea; Tremor, essential; and Type II or unspecified type diabetes mellitus without mention of complication, not stated as uncontrolled. Comorbidities reviewed and education provided. Patient and family's stated goal of care: reduce shortness of breath prior to discharge    Patient's current functional capacity:  Slight limitation of physical activity. Comfortable at rest. Ordinary physical activity results in fatigue, palpitation, dyspnea. Pt resting in bed at this time on 3 L O2. Pt denies shortness of breath. Pt with nonpitting lower extremity edema.  Patient's weights and intake/output reviewed:    Patient Vitals for the past 96 hrs (Last 3 readings):   Weight   09/28/18 0657 252 lb (114.3 kg)   09/27/18 0703 261 lb 4.8 oz (118.5 kg)   09/26/18 1922 268 lb (121.6 kg)       Intake/Output Summary (Last 24 hours) at 09/28/18 1555  Last data filed at 09/28/18 1529   Gross per 24 hour   Intake             2050 ml   Output             3375 ml   Net            -1325 ml       Patient provided with education on CHF signs/symptoms, causes, discharge medications, daily weights, low sodium diet, activity, and follow-up. Notified patient to call the doctor post discharge if patient experiences shortness of breath, chest pain, swelling, cough, or weight gain of three pounds in a day/five pounds in a week. Also notified patient to call the doctor with dizziness, increased fatigue, decreased or difficulty urinating. Pt verbalized understanding. No additional questions at this time.     Education Time: 5 Minutes

## 2018-09-28 NOTE — PROGRESS NOTES
09/28/18 1200   Oxygen Therapy/Pulse Ox   O2 Therapy Oxygen humidified   O2 Device Nasal cannula   O2 Flow Rate (L/min) 3 L/min  (decreased to 2 LPM at this time.)   Resp 18   SpO2 97 %

## 2018-09-28 NOTE — PROGRESS NOTES
Hypothyroidism 01/27/2015    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     Asthma exacerbation 03/09/2011    Cervical spinal cord compression s/p ACDF 11/01/2010    Chronic normocytic anemia     GERD (gastroesophageal reflux disease)     Essential hypertension     Hyperlipidemia     Restless leg syndrome     L carotid artery stenosis s/p CEA 01/01/2002       Plan:  1. Rate control will need anticoagulation  2. Rhythm control with amiodarone drip in view of heart failure she will benefit from sinus rhythm  She has no contraindication to amiodarone  3. Anticoagulation with lovenox for now  5.  Metolazone and aldactone to facilitate diuresis  Core Measures:  · Discharge instructions:   · LVEF documented: 55-60  · ACEI for LV dysfunction: on ARB  · Smoking Cessation:    [address] MD Dyllan 9/28/2018 9:20 AM

## 2018-09-28 NOTE — PROGRESS NOTES
09/27/18 2330   NIV Type   Equipment Type v60   Mode CPAP   Mask Type Full face mask   Mask Size Medium   Settings/Measurements   Comfort Level Good   Using Accessory Muscles No   CPAP 10 cmH2O   Resp 19   SpO2 97   FiO2  30 %   Vt Exhaled 438 mL   Mask Leak (lpm) 2 lpm   Skin Protection for O2 Device Yes   Breath Sounds   Right Upper Lobe Clear   Right Middle Lobe Diminished   Right Lower Lobe Diminished   Left Upper Lobe Clear   Left Lower Lobe Diminished   Alarm Settings   Alarms On Y   Press Low Alarm 6 cmH2O   High Pressure Alarm 25 cmH2O   Resp Rate Low Alarm 6   High Respiratory Rate 40 br/min

## 2018-09-28 NOTE — PLAN OF CARE
Problem: Serum Glucose Level - Abnormal:  Goal: Ability to maintain appropriate glucose levels will improve  Ability to maintain appropriate glucose levels will improve   Outcome: Ongoing  Pt at risk for elevated blood glucose levels R/T DM. Pt will have glucose levels monitored prior to breakfast, lunch, dinner, and bedtime. Elevated levels will be treated via sliding scale instructions. Pt understands the need to monitor levels and has no further complaints at this time.

## 2018-09-28 NOTE — PROGRESS NOTES
Lovenox  Symptoms improved  Could not get the CTPA because of poor IV line and CKD - DW radiologist , given improvement of symptoms the chances of PE a becoming   less likely , check the venous Doppler for DVT - limited study - neg for dvt - cancelled   CTPA ,    -VQ scan      Acute hypoxic respiratory failure-possibly secondary to above     Diabetes mellitus-not on medication, monitor blood sugar with low correction scale and hemoglobin A1c 6.5      Strep sleep apnea-continue CPAP overnight     Parkinson's with resting tremor-continue with Sinemet     Hypertension-continue home medications losartan     Hyperlipidemia-statin      asthma-no  Exacerbation-continue when necessary inhalers, Singulair       DVT Prophylaxis: Lovenox- received full dose of Lovenox yesterday evening  Diet: DIET CARB CONTROL; Low Sodium (2 GM)  Code Status: Full Code    PT/OT Eval Status: May need/ ordered     Dispo - 2-  Days    Addendum-new onset of atrial fibrillation with rapid ventricular rate-cardiology input appreciated, started on amiodarone drip and advise on anticoagulation    Wes Cook MD

## 2018-09-28 NOTE — PLAN OF CARE
Patient and family's stated goal of care: reduce lower extremity edema prior to discharge; post discharge goals include being home with her  and seeing her grandkids    Patient's current functional capacity:  patient seen up walking the halls with PT prior to visit; describes shortness of breath slightly improved with exertion but notes she is still short of breath with so much as conversation; describes any improvement to SOB since admission as minimal    Pt resting in bed at this time on 2 L O2. Pt with complaints of shortness of breath at rest with conversation. Patient still with LE edema; does not appear significantly improved since yesterday. Patient's weights and intake/output reviewed; documented -16 lb weight loss does not entirely correlate with ongoing symptoms and measured output:    Patient Vitals for the past 96 hrs (Last 3 readings):   Weight   09/28/18 0657 252 lb (114.3 kg)   09/27/18 0703 261 lb 4.8 oz (118.5 kg)   09/26/18 1922 268 lb (121.6 kg)       Intake/Output Summary (Last 24 hours) at 09/28/18 1522  Last data filed at 09/28/18 1258   Gross per 24 hour   Intake             1570 ml   Output             3375 ml   Net            -1805 ml     Patient provided with education on CHF signs/symptoms, causes, discharge medications, daily weights, low sodium diet, activity, and follow-up. Notified patient to call the doctor post discharge if patient experiences shortness of breath, chest pain, swelling, cough, or weight gain of three pounds in a day/five pounds in a week. Also notified patient to call the doctor with dizziness, increased fatigue, decreased or difficulty urinating. Pt verbalized understanding. No additional questions at this time. Education Time: 25 Minutes    Recommendations:   1. Consider patient for [facility] shared group medical appointments for outpatient HF education and management.  Next PCP appointment in place for 10/4 at 9:20 AM.  2. [facility] for outpatient HF management

## 2018-09-29 PROBLEM — I50.33 ACUTE ON CHRONIC DIASTOLIC CONGESTIVE HEART FAILURE (HCC): Chronic | Status: ACTIVE | Noted: 2018-09-08

## 2018-09-29 LAB
ANION GAP SERPL CALCULATED.3IONS-SCNC: 10 MMOL/L (ref 3–16)
BUN BLDV-MCNC: 35 MG/DL (ref 7–20)
CALCIUM SERPL-MCNC: 9.3 MG/DL (ref 8.3–10.6)
CHLORIDE BLD-SCNC: 91 MMOL/L (ref 99–110)
CO2: 38 MMOL/L (ref 21–32)
CREAT SERPL-MCNC: 1.3 MG/DL (ref 0.6–1.2)
GFR AFRICAN AMERICAN: 48
GFR NON-AFRICAN AMERICAN: 40
GLUCOSE BLD-MCNC: 112 MG/DL (ref 70–99)
GLUCOSE BLD-MCNC: 113 MG/DL (ref 70–99)
GLUCOSE BLD-MCNC: 123 MG/DL (ref 70–99)
GLUCOSE BLD-MCNC: 139 MG/DL (ref 70–99)
GLUCOSE BLD-MCNC: 97 MG/DL (ref 70–99)
MAGNESIUM: 2 MG/DL (ref 1.8–2.4)
PERFORMED ON: ABNORMAL
POTASSIUM SERPL-SCNC: 4.2 MMOL/L (ref 3.5–5.1)
PRO-BNP: 1472 PG/ML (ref 0–449)
SODIUM BLD-SCNC: 139 MMOL/L (ref 136–145)

## 2018-09-29 PROCEDURE — 80048 BASIC METABOLIC PNL TOTAL CA: CPT

## 2018-09-29 PROCEDURE — 6360000002 HC RX W HCPCS: Performed by: INTERNAL MEDICINE

## 2018-09-29 PROCEDURE — 99233 SBSQ HOSP IP/OBS HIGH 50: CPT | Performed by: NURSE PRACTITIONER

## 2018-09-29 PROCEDURE — 94664 DEMO&/EVAL PT USE INHALER: CPT

## 2018-09-29 PROCEDURE — 2580000003 HC RX 258: Performed by: NURSE PRACTITIONER

## 2018-09-29 PROCEDURE — 83880 ASSAY OF NATRIURETIC PEPTIDE: CPT

## 2018-09-29 PROCEDURE — 6370000000 HC RX 637 (ALT 250 FOR IP): Performed by: INTERNAL MEDICINE

## 2018-09-29 PROCEDURE — 94761 N-INVAS EAR/PLS OXIMETRY MLT: CPT

## 2018-09-29 PROCEDURE — 36415 COLL VENOUS BLD VENIPUNCTURE: CPT

## 2018-09-29 PROCEDURE — 2580000003 HC RX 258: Performed by: INTERNAL MEDICINE

## 2018-09-29 PROCEDURE — 94640 AIRWAY INHALATION TREATMENT: CPT

## 2018-09-29 PROCEDURE — 94660 CPAP INITIATION&MGMT: CPT

## 2018-09-29 PROCEDURE — 1200000000 HC SEMI PRIVATE

## 2018-09-29 PROCEDURE — 83735 ASSAY OF MAGNESIUM: CPT

## 2018-09-29 PROCEDURE — 2700000000 HC OXYGEN THERAPY PER DAY

## 2018-09-29 RX ADMIN — IPRATROPIUM BROMIDE AND ALBUTEROL SULFATE 3 ML: .5; 3 SOLUTION RESPIRATORY (INHALATION) at 08:11

## 2018-09-29 RX ADMIN — PROPAFENONE HYDROCHLORIDE 150 MG: 150 TABLET, FILM COATED ORAL at 22:16

## 2018-09-29 RX ADMIN — CARBIDOPA AND LEVODOPA 1 TABLET: 25; 100 TABLET ORAL at 15:29

## 2018-09-29 RX ADMIN — METOPROLOL TARTRATE 50 MG: 50 TABLET ORAL at 09:55

## 2018-09-29 RX ADMIN — GABAPENTIN 100 MG: 100 CAPSULE ORAL at 22:16

## 2018-09-29 RX ADMIN — LOSARTAN POTASSIUM 100 MG: 100 TABLET, FILM COATED ORAL at 09:55

## 2018-09-29 RX ADMIN — PROPAFENONE HYDROCHLORIDE 150 MG: 150 TABLET, FILM COATED ORAL at 06:10

## 2018-09-29 RX ADMIN — CARBIDOPA AND LEVODOPA 1 TABLET: 25; 100 TABLET ORAL at 22:15

## 2018-09-29 RX ADMIN — IPRATROPIUM BROMIDE AND ALBUTEROL SULFATE 3 ML: .5; 3 SOLUTION RESPIRATORY (INHALATION) at 11:54

## 2018-09-29 RX ADMIN — METOLAZONE 2.5 MG: 2.5 TABLET ORAL at 10:00

## 2018-09-29 RX ADMIN — SODIUM CHLORIDE, PRESERVATIVE FREE 10 ML: 5 INJECTION INTRAVENOUS at 22:16

## 2018-09-29 RX ADMIN — FUROSEMIDE 40 MG: 10 INJECTION, SOLUTION INTRAMUSCULAR; INTRAVENOUS at 22:16

## 2018-09-29 RX ADMIN — METOPROLOL TARTRATE 50 MG: 50 TABLET ORAL at 22:16

## 2018-09-29 RX ADMIN — IPRATROPIUM BROMIDE AND ALBUTEROL SULFATE 3 ML: .5; 3 SOLUTION RESPIRATORY (INHALATION) at 15:43

## 2018-09-29 RX ADMIN — FUROSEMIDE 40 MG: 10 INJECTION, SOLUTION INTRAMUSCULAR; INTRAVENOUS at 09:54

## 2018-09-29 RX ADMIN — ENOXAPARIN SODIUM 120 MG: 120 INJECTION SUBCUTANEOUS at 09:54

## 2018-09-29 RX ADMIN — Medication 1 PUFF: at 08:12

## 2018-09-29 RX ADMIN — IPRATROPIUM BROMIDE AND ALBUTEROL SULFATE 3 ML: .5; 3 SOLUTION RESPIRATORY (INHALATION) at 20:48

## 2018-09-29 RX ADMIN — ENOXAPARIN SODIUM 120 MG: 120 INJECTION SUBCUTANEOUS at 22:16

## 2018-09-29 RX ADMIN — DULOXETINE HYDROCHLORIDE 60 MG: 60 CAPSULE, DELAYED RELEASE ORAL at 09:55

## 2018-09-29 RX ADMIN — CARBIDOPA AND LEVODOPA 1 TABLET: 25; 100 TABLET ORAL at 09:59

## 2018-09-29 RX ADMIN — Medication 1 PUFF: at 20:49

## 2018-09-29 RX ADMIN — ATORVASTATIN CALCIUM 20 MG: 40 TABLET, FILM COATED ORAL at 09:59

## 2018-09-29 RX ADMIN — LAMOTRIGINE 25 MG: 25 TABLET ORAL at 09:55

## 2018-09-29 RX ADMIN — Medication 10 ML: at 10:01

## 2018-09-29 RX ADMIN — SPIRONOLACTONE 25 MG: 25 TABLET ORAL at 09:54

## 2018-09-29 RX ADMIN — MONTELUKAST SODIUM 10 MG: 10 TABLET, COATED ORAL at 22:16

## 2018-09-29 RX ADMIN — Medication 10 ML: at 22:17

## 2018-09-29 RX ADMIN — OLODATEROL RESPIMAT INHALATION SPRAY 2 PUFF: 2.5 SPRAY, METERED RESPIRATORY (INHALATION) at 08:12

## 2018-09-29 RX ADMIN — PROPAFENONE HYDROCHLORIDE 150 MG: 150 TABLET, FILM COATED ORAL at 15:30

## 2018-09-29 RX ADMIN — SODIUM CHLORIDE, PRESERVATIVE FREE 10 ML: 5 INJECTION INTRAVENOUS at 09:54

## 2018-09-29 RX ADMIN — LAMOTRIGINE 25 MG: 25 TABLET ORAL at 22:16

## 2018-09-29 ASSESSMENT — PAIN SCALES - GENERAL: PAINLEVEL_OUTOF10: 0

## 2018-09-29 NOTE — PROGRESS NOTES
09/29/18 0406   NIV Type   $NIV $Daily Charge   Equipment Type v60   Mode CPAP   Mask Type Full face mask   Mask Size Medium   Settings/Measurements   Comfort Level Good   Using Accessory Muscles No   CPAP 10 cmH2O   Resp 20   FiO2  30 %   Vt Exhaled 209 mL   Mask Leak (lpm) 38 lpm   Skin Protection for O2 Device Yes   Alarm Settings   Alarms On Y   Press Low Alarm 6 cmH2O   High Pressure Alarm 25 cmH2O   Apnea (secs) 20 secs   Resp Rate Low Alarm 6   High Respiratory Rate 40 br/min

## 2018-09-29 NOTE — PROGRESS NOTES
27*  32*  35*   CREATININE  1.1  1.2  1.3*   CALCIUM  8.0*  8.8  9.3     No results for input(s): AST, ALT, BILIDIR, BILITOT, ALKPHOS in the last 72 hours. No results for input(s): INR in the last 72 hours. Recent Labs      09/26/18   1717  09/26/18   1940   TROPONINI  0.01  <0.01       Urinalysis:      Lab Results   Component Value Date    NITRU Negative 09/08/2018    WBCUA 0-2 09/08/2018    BACTERIA Rare 09/08/2018    RBCUA None seen 09/08/2018    BLOODU Negative 09/08/2018    SPECGRAV 1.020 09/08/2018    GLUCOSEU Negative 09/08/2018       Radiology:  VL Extremity Venous Bilateral   Final Result      NM LUNG VENT/PERFUSION (VQ)   Final Result   Low probability for pulmonary embolus. XR CHEST PORTABLE   Final Result   1. Hazy bibasilar airspace disease, right greater than left, representing   either atelectasis, pneumonia, or asymmetric edema. 2. Small bilateral pleural effusions. 3. Stable cardiomegaly.          IR PICC WO SQ PORT/PUMP > 5 YEARS    (Results Pending)           Assessment/Plan:    Active Hospital Problems    Diagnosis Date Noted    CHF (congestive heart failure), NYHA class I, acute on chronic, combined ([address] [I50.43] 09/26/2018     Acute diastolic congestive heart failure -  Shortness of breath, congestion and effusion in the chest x-ray, bilateral crackles , mostly go with new onset of acute congestive heart failure-still given the history of present car ride to [place] and swelling of lower extremities sudden onset of shortness of breath with, tachycardia and hypoxia cannot rule out PE-admit, oxygen, Lasix IV, added metolazone and Aldactone, serial troponin, echocardiogram-diastolic CHF grade 1, intake and output, daily weight, cardiology evaluation-Appreciated, CTPA , 1 dose of Lovenox  Symptoms improved  Could not get the CTPA because of poor IV line and CKD - DW radiologist , given improvement of symptoms the chances of PE a becoming   less likely , check the venous Doppler

## 2018-09-29 NOTE — PROGRESS NOTES
[facility]   Daily Progress Note    Admit Date:  9/26/2018  HPI:    Chief Complaint   Patient presents with    Shortness of Breath     Recent pneumonia, diagnosed 9/7. Uses a BIPAP at home. Started with a cough and SOB again today. Admitted with shortness of breath. Hx of dCHF, HTN, HLD, DM, BETSY on CPAP, PAD s/p CEA, CKD. Treated for CHF, diuretics increased with addition of metolazone and spironolactone. Developed AFib, started on Rhythmol, anticoagulated with Lovenox for now, DOAC at discharge. Subjective:  Ms. Zarina Aceves seen sitting on BSC. Still with dyspnea with speaking. Leg edema improved.      Objective:   BP (!) 118/49   Pulse 73   Temp 97.8 °F (36.6 °C) (Oral)   Resp 24   Ht 5' 6\" (1.676 m)   Wt 247 lb (112 kg)   SpO2 93%   BMI 39.87 kg/m²     Intake/Output Summary (Last 24 hours) at 09/29/18 1121  Last data filed at 09/29/18 1036   Gross per 24 hour   Intake             1510 ml   Output             2200 ml   Net             -690 ml     Wt Readings from Last 3 Encounters:   09/29/18 247 lb (112 kg)   09/26/18 268 lb (121.6 kg)   09/14/18 258 lb (117 kg)       Telemetry: SR 70's  NYHA: IV    Physical Exam:  General:  Awake, alert, NAD  Skin:  Warm and dry  Neck:  JVP 10 cm  Chest:  Bibasilar rales 1/2 up with scattered wheeze  Cardiovascular:  RRR, normal S1S2, no m/g/r  Abdomen:  Soft, nontender, +bowel sounds  Extremities:  1+ BLE edema      Medications:    metoprolol tartrate  50 mg Oral BID    enoxaparin  1 mg/kg Subcutaneous BID    metolazone  2.5 mg Oral Daily    spironolactone  25 mg Oral Daily    propafenone  150 mg Oral 3 times per day    furosemide  40 mg Intravenous BID    atorvastatin  20 mg Oral Daily    beclomethasone  1 puff Inhalation BID    carbidopa-levodopa  1 tablet Oral TID    DULoxetine  60 mg Oral Daily    gabapentin  100 mg Oral Nightly    lamoTRIgine  25 mg Oral BID    losartan  100 mg Oral Daily    montelukast  10 mg Oral Nightly    propafenone   -CHADSVASc score of 6, anticoagulation recommended   -continue lovenox for now, consider DOAC when closer for discharge and no procedures/ testing planned  ~HTN   -BP stable/ improved   -continue losartan, spironolactone, metoprolol  ~BETSY  ~DM  ~HLD      MAHAD Garcia - CNP, 9/29/2018, 11:21 AM

## 2018-09-30 LAB
ANION GAP SERPL CALCULATED.3IONS-SCNC: 12 MMOL/L (ref 3–16)
BUN BLDV-MCNC: 50 MG/DL (ref 7–20)
CALCIUM SERPL-MCNC: 9.3 MG/DL (ref 8.3–10.6)
CHLORIDE BLD-SCNC: 91 MMOL/L (ref 99–110)
CO2: 36 MMOL/L (ref 21–32)
CREAT SERPL-MCNC: 1.9 MG/DL (ref 0.6–1.2)
GFR AFRICAN AMERICAN: 31
GFR NON-AFRICAN AMERICAN: 26
GLUCOSE BLD-MCNC: 101 MG/DL (ref 70–99)
GLUCOSE BLD-MCNC: 103 MG/DL (ref 70–99)
GLUCOSE BLD-MCNC: 128 MG/DL (ref 70–99)
GLUCOSE BLD-MCNC: 159 MG/DL (ref 70–99)
GLUCOSE BLD-MCNC: 164 MG/DL (ref 70–99)
MAGNESIUM: 2 MG/DL (ref 1.8–2.4)
PERFORMED ON: ABNORMAL
POTASSIUM SERPL-SCNC: 3.9 MMOL/L (ref 3.5–5.1)
SODIUM BLD-SCNC: 139 MMOL/L (ref 136–145)

## 2018-09-30 PROCEDURE — 6370000000 HC RX 637 (ALT 250 FOR IP): Performed by: INTERNAL MEDICINE

## 2018-09-30 PROCEDURE — 83735 ASSAY OF MAGNESIUM: CPT

## 2018-09-30 PROCEDURE — 94660 CPAP INITIATION&MGMT: CPT

## 2018-09-30 PROCEDURE — 94761 N-INVAS EAR/PLS OXIMETRY MLT: CPT

## 2018-09-30 PROCEDURE — 2580000003 HC RX 258: Performed by: INTERNAL MEDICINE

## 2018-09-30 PROCEDURE — 36415 COLL VENOUS BLD VENIPUNCTURE: CPT

## 2018-09-30 PROCEDURE — 99232 SBSQ HOSP IP/OBS MODERATE 35: CPT | Performed by: NURSE PRACTITIONER

## 2018-09-30 PROCEDURE — 1200000000 HC SEMI PRIVATE

## 2018-09-30 PROCEDURE — 2580000003 HC RX 258: Performed by: NURSE PRACTITIONER

## 2018-09-30 PROCEDURE — 2700000000 HC OXYGEN THERAPY PER DAY

## 2018-09-30 PROCEDURE — 80048 BASIC METABOLIC PNL TOTAL CA: CPT

## 2018-09-30 PROCEDURE — 6360000002 HC RX W HCPCS: Performed by: INTERNAL MEDICINE

## 2018-09-30 PROCEDURE — 94640 AIRWAY INHALATION TREATMENT: CPT

## 2018-09-30 RX ORDER — FUROSEMIDE 10 MG/ML
20 INJECTION INTRAMUSCULAR; INTRAVENOUS 2 TIMES DAILY
Status: DISCONTINUED | OUTPATIENT
Start: 2018-10-01 | End: 2018-10-01

## 2018-09-30 RX ADMIN — DULOXETINE HYDROCHLORIDE 60 MG: 60 CAPSULE, DELAYED RELEASE ORAL at 09:09

## 2018-09-30 RX ADMIN — INSULIN LISPRO 1 UNITS: 100 INJECTION, SOLUTION INTRAVENOUS; SUBCUTANEOUS at 13:27

## 2018-09-30 RX ADMIN — IPRATROPIUM BROMIDE AND ALBUTEROL SULFATE 3 ML: .5; 3 SOLUTION RESPIRATORY (INHALATION) at 11:09

## 2018-09-30 RX ADMIN — PROPAFENONE HYDROCHLORIDE 150 MG: 150 TABLET, FILM COATED ORAL at 21:51

## 2018-09-30 RX ADMIN — CARBIDOPA AND LEVODOPA 1 TABLET: 25; 100 TABLET ORAL at 21:51

## 2018-09-30 RX ADMIN — IPRATROPIUM BROMIDE AND ALBUTEROL SULFATE 3 ML: .5; 3 SOLUTION RESPIRATORY (INHALATION) at 07:57

## 2018-09-30 RX ADMIN — IPRATROPIUM BROMIDE AND ALBUTEROL SULFATE 3 ML: .5; 3 SOLUTION RESPIRATORY (INHALATION) at 20:18

## 2018-09-30 RX ADMIN — Medication 10 ML: at 21:53

## 2018-09-30 RX ADMIN — METOPROLOL TARTRATE 50 MG: 50 TABLET ORAL at 21:50

## 2018-09-30 RX ADMIN — INSULIN LISPRO 1 UNITS: 100 INJECTION, SOLUTION INTRAVENOUS; SUBCUTANEOUS at 21:54

## 2018-09-30 RX ADMIN — ENOXAPARIN SODIUM 120 MG: 120 INJECTION SUBCUTANEOUS at 21:51

## 2018-09-30 RX ADMIN — CARBIDOPA AND LEVODOPA 1 TABLET: 25; 100 TABLET ORAL at 09:10

## 2018-09-30 RX ADMIN — LAMOTRIGINE 25 MG: 25 TABLET ORAL at 09:09

## 2018-09-30 RX ADMIN — SPIRONOLACTONE 25 MG: 25 TABLET ORAL at 09:09

## 2018-09-30 RX ADMIN — Medication 1 PUFF: at 07:57

## 2018-09-30 RX ADMIN — FUROSEMIDE 40 MG: 10 INJECTION, SOLUTION INTRAMUSCULAR; INTRAVENOUS at 09:08

## 2018-09-30 RX ADMIN — IPRATROPIUM BROMIDE AND ALBUTEROL SULFATE 3 ML: .5; 3 SOLUTION RESPIRATORY (INHALATION) at 15:40

## 2018-09-30 RX ADMIN — SODIUM CHLORIDE, PRESERVATIVE FREE 10 ML: 5 INJECTION INTRAVENOUS at 09:08

## 2018-09-30 RX ADMIN — ATORVASTATIN CALCIUM 20 MG: 40 TABLET, FILM COATED ORAL at 09:13

## 2018-09-30 RX ADMIN — GABAPENTIN 100 MG: 100 CAPSULE ORAL at 21:50

## 2018-09-30 RX ADMIN — LAMOTRIGINE 25 MG: 25 TABLET ORAL at 21:51

## 2018-09-30 RX ADMIN — CARBIDOPA AND LEVODOPA 1 TABLET: 25; 100 TABLET ORAL at 14:59

## 2018-09-30 RX ADMIN — PROPAFENONE HYDROCHLORIDE 150 MG: 150 TABLET, FILM COATED ORAL at 05:29

## 2018-09-30 RX ADMIN — ENOXAPARIN SODIUM 120 MG: 120 INJECTION SUBCUTANEOUS at 09:09

## 2018-09-30 RX ADMIN — Medication 10 ML: at 09:13

## 2018-09-30 RX ADMIN — PROPAFENONE HYDROCHLORIDE 150 MG: 150 TABLET, FILM COATED ORAL at 15:00

## 2018-09-30 RX ADMIN — OLODATEROL RESPIMAT INHALATION SPRAY 2 PUFF: 2.5 SPRAY, METERED RESPIRATORY (INHALATION) at 07:58

## 2018-09-30 RX ADMIN — METOPROLOL TARTRATE 50 MG: 50 TABLET ORAL at 09:10

## 2018-09-30 RX ADMIN — SODIUM CHLORIDE, PRESERVATIVE FREE 10 ML: 5 INJECTION INTRAVENOUS at 21:52

## 2018-09-30 RX ADMIN — LOSARTAN POTASSIUM 100 MG: 100 TABLET, FILM COATED ORAL at 09:09

## 2018-09-30 RX ADMIN — Medication 1 PUFF: at 20:18

## 2018-09-30 RX ADMIN — MONTELUKAST SODIUM 10 MG: 10 TABLET, COATED ORAL at 21:51

## 2018-09-30 ASSESSMENT — PAIN SCALES - GENERAL: PAINLEVEL_OUTOF10: 0

## 2018-09-30 NOTE — PROGRESS NOTES
Report given to [facility]. Call light and bedside table within reach. No needs voiced at this time. Bed in lowest position, brakes locked. Bed alarm on and in place.

## 2018-09-30 NOTE — PLAN OF CARE
Problem: Serum Glucose Level - Abnormal:  Goal: Ability to maintain appropriate glucose levels will improve  Ability to maintain appropriate glucose levels will improve   Outcome: Ongoing  Pt educated on the importance of a carb control diet. Monitoring pt's blood glucose AC/HS. Sliding scale insulin given as ordered according to pt's blood glucose. Will continue to monitor.

## 2018-09-30 NOTE — PROGRESS NOTES
flush  10 mL Intravenous 2 times per day     PRN Meds: albuterol sulfate HFA, sodium chloride flush, magnesium hydroxide, iopamidol, glucose, dextrose, glucagon (rDNA), dextrose, sodium chloride flush      Intake/Output Summary (Last 24 hours) at 09/30/18 0743  Last data filed at 09/30/18 0529   Gross per 24 hour   Intake              840 ml   Output             1975 ml   Net            -1135 ml       Physical Exam Performed:    /74   Pulse 70   Temp 98.2 °F (36.8 °C) (Oral)   Resp 16   Ht 5' 6\" (1.676 m)   Wt 246 lb 3.2 oz (111.7 kg)   SpO2 94%   BMI 39.74 kg/m²     General appearance:   no respiratory distress, appears stated age and cooperative. Obese ,on oxygen 1 l  HEENT:  Normal cephalic, atraumatic without obvious deformity. Pupils equal, round, and reactive to light. Extra ocular muscles intact. Conjunctivae/corneas clear. Neck: Supple, with full range of motion. No jugular venous distention. Trachea midline. Respiratory:  Normal respiratory effort. , bilaterally with  Rales/no Wheezes/Rhonchi. Cardiovascular:  Regular rate and rhythm with normal S1/S2 without murmurs, rubs or gallops. Abdomen: Soft, non-tender, non-distended with normal bowel sounds. Obese  Musculoskeletal:  No clubbing, cyanosis , has  edema bilaterally- improved . Full range of motion without deformity. Skin: Skin color, texture, turgor normal.  No rashes or lesions. Neurologic:  Neurovascularly intact without any focal sensory/motor deficits. Cranial nerves: II-XII intact, grossly non-focal.  Psychiatric:  Alert and oriented, thought content appropriate, normal insight  Capillary Refill: Brisk,< 3 seconds   Peripheral Pulses: +2 palpable, equal bilaterally       Labs:   No results for input(s): WBC, HGB, HCT, PLT in the last 72 hours.   Recent Labs      09/28/18   0627  09/29/18   0624  09/30/18   0611   NA  141  139  139   K  3.9  4.2  3.9   CL  96*  91*  91*   CO2  38*  38*  36*   BUN  32*  35*  50*   CREATININE the venous Doppler for DVT - limited study - neg for dvt - cancelled   CTPA ,    -VQ scan  - neg for PE  -Metolazone discontinued because elevated kidney function, further adjustment defer to cardiology    Acute hypoxic respiratory failure-possibly secondary to above     Diabetes mellitus-not on medication, monitor blood sugar with low correction scale and hemoglobin A1c 6.5      Strep sleep apnea-continue CPAP overnight     Parkinson's with resting tremor-continue with Sinemet     Hypertension-continue home medications losartan     Hyperlipidemia-statin      asthma-no  Exacerbation-continue when necessary inhalers, Singulair    new onset of atrial fibrillation with rapid ventricular rate- cardiology input appreciated, s/p  amiodarone drip  ,anticoagulation Lovenox, on Rythmol and beta blocker    Acute and chronic kidney disease- stable secondary to diuretics, may cut down the Lasix-deferred to cardiology, may consider nephrology eval if worsened       DVT Prophylaxis: Lovenox- received full dose of Lovenox yesterday evening  Diet: DIET CARB CONTROL; Low Sodium (2 GM)  Code Status: Full Code    PT/OT Eval Status: May need/ ordered     Dispo - 2-  Days    Rangel Covington MD

## 2018-09-30 NOTE — CONSULTS
Nutrition Education    Type and Reason for Visit: Initial, Consult, Patient Education    Pt seen per [facility] for CHF diet education. Pt is a 69 yo female with a Hx of CKD, DM, GERD, HTN, HLD, who was admitted with SOB. Provided pt with written and verbal instruction on HF nutrition therapy. Discussed low sodium diet, daily weights, and fluid restriction. Pt voiced understanding. Pt reports that she goes out to eat frequently and also eats high sodium foods such as TV dinners. Pt reports that she does not drink excess amounts of fluid, but drinks pop often. Discussed healthier alternatives to pop. Pt states that she weighs herself everyday. Pt had no questions. Time spent: 10 minutes    · Verbally reviewed following information with Patient/: HF nutrition therapy  · Written educational materials provided. · Contact name and number provided. · Refer to Patient Education activity for more details.     Electronically signed by Enrico Foley RD, LD on 9/27/18 at 10:13 AM    Contact Number: Office: [phone]; [address]: [phone]
file.     Social History Main Topics    Smoking status: Never Smoker    Smokeless tobacco: Never Used    Alcohol use No    Drug use: No    Sexual activity: Not on file     Other Topics Concern    Not on file     Social History Narrative    ** Merged History Encounter **            Family History   Problem Relation Age of Onset    Diabetes Mother     Heart Disease Mother     Diabetes Father     Heart Disease Father     Diabetes Sister           Medication:     Scheduled Meds:   [START ON 10/1/2018] furosemide  20 mg Intravenous BID    metoprolol tartrate  50 mg Oral BID    enoxaparin  1 mg/kg Subcutaneous BID    spironolactone  25 mg Oral Daily    propafenone  150 mg Oral 3 times per day    atorvastatin  20 mg Oral Daily    beclomethasone  1 puff Inhalation BID    carbidopa-levodopa  1 tablet Oral TID    DULoxetine  60 mg Oral Daily    gabapentin  100 mg Oral Nightly    lamoTRIgine  25 mg Oral BID    losartan  100 mg Oral Daily    montelukast  10 mg Oral Nightly    sodium chloride flush  10 mL Intravenous 2 times per day    insulin lispro  0-6 Units Subcutaneous TID WC    insulin lispro  0-3 Units Subcutaneous Nightly    olodaterol  2 puff Inhalation Daily    ipratropium-albuterol  1 vial Inhalation Q4H WA    lidocaine 1 % injection  5 mL Intradermal Once    sodium chloride flush  10 mL Intravenous 2 times per day     Continuous Infusions:   dextrose       PRN Meds:.albuterol sulfate HFA, sodium chloride flush, magnesium hydroxide, iopamidol, glucose, dextrose, glucagon (rDNA), dextrose, sodium chloride flush       Vitals :     Vitals:    09/30/18 1127   BP: (!) 81/42   Pulse: 68   Resp: 14   Temp: 98.2 °F (36.8 °C)   SpO2: 96%       I & O :       Intake/Output Summary (Last 24 hours) at 09/30/18 1414  Last data filed at 09/30/18 1025   Gross per 24 hour   Intake              720 ml   Output             2125 ml   Net            -1405 ml        Physical Examination :     General

## 2018-09-30 NOTE — PLAN OF CARE
Problem: OXYGENATION/RESPIRATORY FUNCTION  Goal: Patient will achieve/maintain normal respiratory rate/effort  Respiratory rate and effort will be within normal limits for the patient   Outcome: Ongoing      Problem: HEMODYNAMIC STATUS  Goal: Patient has stable vital signs and fluid balance  Outcome: Ongoing      Problem: FLUID AND ELECTROLYTE IMBALANCE  Goal: Fluid and electrolyte balance are achieved/maintained  Outcome: Ongoing      Problem: ACTIVITY INTOLERANCE/IMPAIRED MOBILITY  Goal: Mobility/activity is maintained at optimum level for patient  Outcome: Ongoing      Comments:   Patient's EF (Ejection Fraction) is greater than 40%    Patient has a past medical history of Anemia, unspecified; Asthma; Baker's cyst; Blind left eye; Carotid artery stenoses; Carotid stenosis; Cervical spinal cord compression ([facility] [address] 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-sided sciatica; CKD (chronic kidney disease) stage 3, GFR 30-59 ml/min; Community acquired pneumonia of left lower lobe of lung ([facility] [address] CRI; Detached retina, left; Diverticulosis; DJD (degenerative joint disease); Edema; Fatty liver; GERD (gastroesophageal reflux disease); Herniated lumbar intervertebral disc; Hyperlipidemia; Hypertension; Hypothyroid; Morbid obesity with BMI of 40.0-44.9, adult ([facility] [address] BETSY treated with BiPAP; Restless leg syndrome; Restless legs syndrome; Sleep apnea; Tremor, essential; and Type II or unspecified type diabetes mellitus without mention of complication, not stated as uncontrolled. Comorbidities reviewed and education provided. Patient and family's stated goal of care: increase activity tolerance prior to discharge    Patient's current functional capacity:  Slight limitation of physical activity. Comfortable at rest. Ordinary physical activity results in fatigue, palpitation, dyspnea. Pt resting in bed at this time on 1 L O2. Pt with complaints of shortness of breath. Pt with pitting 1+ lower extremity edema.  Patient's weights

## 2018-10-01 PROBLEM — I50.31 ACUTE DIASTOLIC CONGESTIVE HEART FAILURE (HCC): Chronic | Status: ACTIVE | Noted: 2018-09-08

## 2018-10-01 LAB
ANION GAP SERPL CALCULATED.3IONS-SCNC: 7 MMOL/L (ref 3–16)
BUN BLDV-MCNC: 59 MG/DL (ref 7–20)
CALCIUM SERPL-MCNC: 9 MG/DL (ref 8.3–10.6)
CHLORIDE BLD-SCNC: 90 MMOL/L (ref 99–110)
CO2: 39 MMOL/L (ref 21–32)
CREAT SERPL-MCNC: 2.1 MG/DL (ref 0.6–1.2)
GFR AFRICAN AMERICAN: 28
GFR NON-AFRICAN AMERICAN: 23
GLUCOSE BLD-MCNC: 106 MG/DL (ref 70–99)
GLUCOSE BLD-MCNC: 116 MG/DL (ref 70–99)
GLUCOSE BLD-MCNC: 122 MG/DL (ref 70–99)
GLUCOSE BLD-MCNC: 150 MG/DL (ref 70–99)
GLUCOSE BLD-MCNC: 163 MG/DL (ref 70–99)
MAGNESIUM: 2.1 MG/DL (ref 1.8–2.4)
PERFORMED ON: ABNORMAL
POTASSIUM SERPL-SCNC: 4.5 MMOL/L (ref 3.5–5.1)
PRO-BNP: 391 PG/ML (ref 0–449)
SODIUM BLD-SCNC: 136 MMOL/L (ref 136–145)

## 2018-10-01 PROCEDURE — 94640 AIRWAY INHALATION TREATMENT: CPT

## 2018-10-01 PROCEDURE — 2580000003 HC RX 258: Performed by: INTERNAL MEDICINE

## 2018-10-01 PROCEDURE — 6370000000 HC RX 637 (ALT 250 FOR IP): Performed by: INTERNAL MEDICINE

## 2018-10-01 PROCEDURE — 1200000000 HC SEMI PRIVATE

## 2018-10-01 PROCEDURE — 83735 ASSAY OF MAGNESIUM: CPT

## 2018-10-01 PROCEDURE — 94660 CPAP INITIATION&MGMT: CPT

## 2018-10-01 PROCEDURE — 83880 ASSAY OF NATRIURETIC PEPTIDE: CPT

## 2018-10-01 PROCEDURE — 80048 BASIC METABOLIC PNL TOTAL CA: CPT

## 2018-10-01 PROCEDURE — 94761 N-INVAS EAR/PLS OXIMETRY MLT: CPT

## 2018-10-01 PROCEDURE — 99233 SBSQ HOSP IP/OBS HIGH 50: CPT | Performed by: INTERNAL MEDICINE

## 2018-10-01 PROCEDURE — 97116 GAIT TRAINING THERAPY: CPT

## 2018-10-01 PROCEDURE — 2700000000 HC OXYGEN THERAPY PER DAY

## 2018-10-01 PROCEDURE — 94664 DEMO&/EVAL PT USE INHALER: CPT

## 2018-10-01 PROCEDURE — 36415 COLL VENOUS BLD VENIPUNCTURE: CPT

## 2018-10-01 PROCEDURE — 2580000003 HC RX 258: Performed by: NURSE PRACTITIONER

## 2018-10-01 PROCEDURE — 6360000002 HC RX W HCPCS: Performed by: INTERNAL MEDICINE

## 2018-10-01 PROCEDURE — 6360000002 HC RX W HCPCS: Performed by: NURSE PRACTITIONER

## 2018-10-01 PROCEDURE — 97110 THERAPEUTIC EXERCISES: CPT

## 2018-10-01 RX ORDER — FUROSEMIDE 40 MG/1
40 TABLET ORAL DAILY
Status: DISCONTINUED | OUTPATIENT
Start: 2018-10-02 | End: 2018-10-03 | Stop reason: HOSPADM

## 2018-10-01 RX ADMIN — METOPROLOL TARTRATE 25 MG: 25 TABLET ORAL at 21:15

## 2018-10-01 RX ADMIN — PROPAFENONE HYDROCHLORIDE 150 MG: 150 TABLET, FILM COATED ORAL at 21:14

## 2018-10-01 RX ADMIN — Medication 10 ML: at 21:17

## 2018-10-01 RX ADMIN — PROPAFENONE HYDROCHLORIDE 150 MG: 150 TABLET, FILM COATED ORAL at 05:48

## 2018-10-01 RX ADMIN — Medication 1 PUFF: at 19:30

## 2018-10-01 RX ADMIN — INSULIN LISPRO 1 UNITS: 100 INJECTION, SOLUTION INTRAVENOUS; SUBCUTANEOUS at 21:17

## 2018-10-01 RX ADMIN — OLODATEROL RESPIMAT INHALATION SPRAY 2 PUFF: 2.5 SPRAY, METERED RESPIRATORY (INHALATION) at 07:55

## 2018-10-01 RX ADMIN — LAMOTRIGINE 25 MG: 25 TABLET ORAL at 08:27

## 2018-10-01 RX ADMIN — IPRATROPIUM BROMIDE AND ALBUTEROL SULFATE 3 ML: .5; 3 SOLUTION RESPIRATORY (INHALATION) at 15:51

## 2018-10-01 RX ADMIN — Medication 10 ML: at 08:30

## 2018-10-01 RX ADMIN — FUROSEMIDE 20 MG: 10 INJECTION, SOLUTION INTRAMUSCULAR; INTRAVENOUS at 08:27

## 2018-10-01 RX ADMIN — SODIUM CHLORIDE, PRESERVATIVE FREE 10 ML: 5 INJECTION INTRAVENOUS at 08:29

## 2018-10-01 RX ADMIN — APIXABAN 2.5 MG: 2.5 TABLET, FILM COATED ORAL at 21:14

## 2018-10-01 RX ADMIN — GABAPENTIN 100 MG: 100 CAPSULE ORAL at 21:14

## 2018-10-01 RX ADMIN — DULOXETINE HYDROCHLORIDE 60 MG: 60 CAPSULE, DELAYED RELEASE ORAL at 08:27

## 2018-10-01 RX ADMIN — MONTELUKAST SODIUM 10 MG: 10 TABLET, COATED ORAL at 21:15

## 2018-10-01 RX ADMIN — Medication 1 PUFF: at 07:55

## 2018-10-01 RX ADMIN — PROPAFENONE HYDROCHLORIDE 150 MG: 150 TABLET, FILM COATED ORAL at 14:54

## 2018-10-01 RX ADMIN — IPRATROPIUM BROMIDE AND ALBUTEROL SULFATE 3 ML: .5; 3 SOLUTION RESPIRATORY (INHALATION) at 07:55

## 2018-10-01 RX ADMIN — CARBIDOPA AND LEVODOPA 1 TABLET: 25; 100 TABLET ORAL at 08:27

## 2018-10-01 RX ADMIN — CARBIDOPA AND LEVODOPA 1 TABLET: 25; 100 TABLET ORAL at 14:54

## 2018-10-01 RX ADMIN — ENOXAPARIN SODIUM 120 MG: 120 INJECTION SUBCUTANEOUS at 08:28

## 2018-10-01 RX ADMIN — ATORVASTATIN CALCIUM 20 MG: 40 TABLET, FILM COATED ORAL at 08:27

## 2018-10-01 RX ADMIN — SPIRONOLACTONE 25 MG: 25 TABLET ORAL at 08:27

## 2018-10-01 RX ADMIN — CARBIDOPA AND LEVODOPA 1 TABLET: 25; 100 TABLET ORAL at 21:15

## 2018-10-01 RX ADMIN — IPRATROPIUM BROMIDE AND ALBUTEROL SULFATE 3 ML: .5; 3 SOLUTION RESPIRATORY (INHALATION) at 11:57

## 2018-10-01 RX ADMIN — METOPROLOL TARTRATE 50 MG: 50 TABLET ORAL at 08:27

## 2018-10-01 RX ADMIN — IPRATROPIUM BROMIDE AND ALBUTEROL SULFATE 3 ML: .5; 3 SOLUTION RESPIRATORY (INHALATION) at 19:30

## 2018-10-01 RX ADMIN — LAMOTRIGINE 25 MG: 25 TABLET ORAL at 21:15

## 2018-10-01 ASSESSMENT — PAIN SCALES - GENERAL: PAINLEVEL_OUTOF10: 0

## 2018-10-01 NOTE — PROGRESS NOTES
[facility]    [facility]              [phone]                       Plan :     BP very low, causing cr to go up  Losartan held, metoprolol  Agree with switching to eliquis - since lovenox can   have more adverse effects with FARHEEN     Assessment :     Acute Kidney Injury on CKD III  Cr peaked to 2.1 from 1 on  Admission  Making urine  Likely hypotension, cardiorenal syndrome    UA- bland, protein 100    HFpEF  Admitted with sob  Got lasix and metolazone, now off metolazone  And continues on lasix  Echo: 9/27/18- EF normal, Grade I DD      Hypotension in patient with known Hypertension  BP low   Normally hypertensive  DCed losartan on 9/30/18  Metoprolol reduced on 10/1/18  Has Afib      BETSY- on CPAP  Parkinson's disease, on [address] Nephrology would like to thank Barry Hughes MD   for opportunity to serve this patient      Please call with questions at-   24 Hrs Answering service [phone] or  7 am- 5 pm via Perfect serve or cell phone  Dr.Sudhir Lisa Seay          CC/reason for consult :     FARHEEN     HPI :     From consult note-   Xi Kaplan is a 70 y.o. female presented to   the hospital on 9/26/2018 with shortness of breath. Happened suddenly when she work up. Known to  Have pneumonia, for which she was in the hospital  Recently. No other complaints. Her SOB is getting  Better. Meanwhile, we are consulted for FARHEEN.     Denies any renal problem in the past, and denies  Follow up with nephrologist.  She was being treated for CHF with multiple diuretics,  Not metolazone DCed and lasix decreased     Interval History:     BP low yesterday  Coming up  Making urine  Sob stable    ROS:     Seen with- RN  SOB- none  Edema- none  Nausea/vomiting- none  Poor appetite-No  Confusion- no  Urinary complaints- no  Any other complaints- no  All other ROS are reviewed and are Negative     Medication:     Scheduled Meds:   metoprolol tartrate  25 mg Oral BID    furosemide  20

## 2018-10-01 NOTE — PROGRESS NOTES
Ricardo Byrne MD paged \"I saw where in comment of Eliquis order \"if ok with cardiology\", but cardiology isn't consulted for this patient currently. Would you like me to place a cardiology consult? Thanks! \"    Waiting for response.

## 2018-10-01 NOTE — PROGRESS NOTES
Bedside report given to [facility]. Call light and bedside table within reach. No needs voiced at this time. Bed in lowest position, brakes locked. Bed alarm on and in place.

## 2018-10-01 NOTE — PLAN OF CARE
Problem: Serum Glucose Level - Abnormal:  Goal: Ability to maintain appropriate glucose levels will improve  Ability to maintain appropriate glucose levels will improve   Outcome: Ongoing  Pt didn't require sliding coverage for BS this shift. Diabetes education provided today:    Foot care: advised to wash feet daily, pat dry and apply lotion at night, avoiding between toes. Need to look at feet daily and report to a physician any signs of inflammation or skin damage. Discussed diabetes shoes and socks. Managing high and low sugar readings.

## 2018-10-01 NOTE — PROGRESS NOTES
Physical Therapy  Facility/Department: [facility] A2 CARD TELEMETRY  Daily Treatment Note  NAME: Lai Harris  : 1940  MRN: [id number]    Date of Service: 10/1/2018    Discharge Recommendations:  Continue to assess pending progress, 24 hour supervision or assist   PT Equipment Recommendations  Equipment Needed: No    Patient Diagnosis(es): The primary encounter diagnosis was Acute congestive heart failure, unspecified heart failure type ([facility] Diagnoses of Elevated troponin and Chest pain, unspecified type were also pertinent to this visit. has a past medical history of Anemia, unspecified; Asthma; Baker's cyst; Blind left eye; Carotid artery stenoses; Carotid stenosis; Cervical spinal cord compression ([address] 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-sided sciatica; CKD (chronic kidney disease) stage 3, GFR 30-59 ml/min; Community acquired pneumonia of left lower lobe of lung ([address] CRI; Detached retina, left; Diverticulosis; DJD (degenerative joint disease); Edema; Fatty liver; GERD (gastroesophageal reflux disease); Herniated lumbar intervertebral disc; Hyperlipidemia; Hypertension; Hypothyroid; Morbid obesity with BMI of 40.0-44.9, adult ([address] BETSY treated with BiPAP; Restless leg syndrome; Restless legs syndrome; Sleep apnea; Tremor, essential; and Type II or unspecified type diabetes mellitus without mention of complication, not stated as uncontrolled. has a past surgical history that includes Tonsillectomy; Hysterectomy; Cholecystectomy; Tubal ligation; knee surgery; hernia repair; eye surgery; Cataract removal; vitrectomy; Corneal transplant; Carotid endarterectomy; Appendectomy; cervical fusion (2010); and shoulder surgery. Restrictions  Restrictions/Precautions  Restrictions/Precautions: General Precautions     Subjective   General  Chart Reviewed:  Yes  Additional Pertinent Hx: CHF and hx of Parkinsons  Response To Previous Treatment: Not applicable  Family / Caregiver Present: No  Referring

## 2018-10-01 NOTE — PROGRESS NOTES
complication, not stated as uncontrolled      Past Surgical History:   Procedure Laterality Date    APPENDECTOMY      CAROTID ENDARTERECTOMY      left    CATARACT REMOVAL      CERVICAL FUSION  11/2010    CHOLECYSTECTOMY      CORNEAL TRANSPLANT      EYE SURGERY      HERNIA REPAIR      HYSTERECTOMY      KNEE SURGERY      replacement x 2    SHOULDER SURGERY      TONSILLECTOMY      TUBAL LIGATION      VITRECTOMY         Objective:   /68   Pulse 76   Temp 97.7 °F (36.5 °C) (Oral)   Resp 18   Ht 5' 6\" (1.676 m)   Wt 248 lb 3.2 oz (112.6 kg)   SpO2 (!) 85% Comment: placed back on 1lpm  BMI 40.06 kg/m²       Intake/Output Summary (Last 24 hours) at 10/01/18 1344  Last data filed at 10/01/18 1332   Gross per 24 hour   Intake              960 ml   Output             1575 ml   Net             -615 ml       TELEMETRY:NSR    Physical Exam:  General: No Respiratory distress, appears well developed and well nourished. Eyes:  Sclera nonicteric  Nose/Sinuses:  negative findings: nose shows no deformity, asymmetry, or inflammation, nasal mucosa normal, septum midline with no perforation or bleeding  Back:  no pain to palpation  Joint:  no active joint inflammation  Musculoskeletal:  negative  Skin:  Warm and dry  Neck:  Negative for JVD and Carotid Bruits. Chest:  LLL crackles  to auscultation, respiration easy  Cardiovascular:  RRR, S1S2 normal, no murmur, no rub or thrill.   Abdomen:  Soft normal liver and spleen  Extremities:   2 + bilat  Non pitting edema, no clubbing, cyanosis,  Pulses: Femoral and pedal pulses are normal.  Neuro: intact    Medications:    metoprolol tartrate  25 mg Oral BID    apixaban  2.5 mg Oral BID    [START ON 10/2/2018] furosemide  40 mg Oral Daily    propafenone  150 mg Oral 3 times per day    atorvastatin  20 mg Oral Daily    beclomethasone  1 puff Inhalation BID    carbidopa-levodopa  1 tablet Oral TID    DULoxetine  60 mg Oral Daily    gabapentin  100 mg Oral change from echo done 3/4/2014.   Left ventricular systolic function is normal with the ejection fraction   estimated at 55%.   No regional wall motion abnormalities.   Grade II diastolic dysfunction with elevated filling pressure.   Mild concentric left ventricular hypertrophy with a sigmoid septum.  No   outflow obstruction.   Left ventricle size is normal.   Mild mitral annular calcification.   Mitral valve leaflets are structurally normal.   Calcification in the chordae of the mitral valve.   Mild mitral valve regurgitation.   Aortic valve leaflets are mildly thickened with normal opening.   No aortic valve regurgitation.      Assessment  Acute diastolic CHF probable past CHF also, clinically improved   Borderline low BP and elevated creatinine  Creatinine levels may be where she needs to be to keep fluid out of her lungs  New onset afib RVR resolved  Hypertension controlled  Sleep apnea  Obesity   Blind in left eye  Hyperlipidemia  Diabetes mellitus  She is euthyroid  Patient Active Problem List    Diagnosis Date Noted    FARHEEN (acute kidney injury) ([address]     PAF (paroxysmal atrial fibrillation) ([address]     CHF (congestive heart failure), NYHA class I, acute on chronic, combined ([address] 09/26/2018    Sepsis ([address] 09/08/2018    Elevated BUN:Cr ratio 09/08/2018    Elevated brain natriuretic peptide (BNP) level 09/08/2018    Community acquired pneumonia of left lower lobe of lung ([address] 09/08/2018    Acute hypoxemic respiratory failure ([address] 09/08/2018    Acute on chronic diastolic congestive heart failure ([address] 09/08/2018    Chest tightness 09/08/2018    BETSY treated with BiPAP     Hepatic steatosis     Diverticulosis     Blind left eye     Parkinson's disease ([address] 05/22/2018    Hx stage 2 sacral decubitus ulcer 04/13/2018    CAD (coronary artery disease)     Morbid obesity with BMI of 40.0-44.9, adult ([address] 02/10/2017    Moderate persistent asthma 08/09/2016    Chronic midline low back pain with

## 2018-10-02 LAB
ANION GAP SERPL CALCULATED.3IONS-SCNC: 10 MMOL/L (ref 3–16)
BUN BLDV-MCNC: 69 MG/DL (ref 7–20)
CALCIUM SERPL-MCNC: 9.1 MG/DL (ref 8.3–10.6)
CHLORIDE BLD-SCNC: 91 MMOL/L (ref 99–110)
CO2: 34 MMOL/L (ref 21–32)
CREAT SERPL-MCNC: 2.2 MG/DL (ref 0.6–1.2)
GFR AFRICAN AMERICAN: 26
GFR NON-AFRICAN AMERICAN: 22
GLUCOSE BLD-MCNC: 110 MG/DL (ref 70–99)
GLUCOSE BLD-MCNC: 112 MG/DL (ref 70–99)
GLUCOSE BLD-MCNC: 113 MG/DL (ref 70–99)
GLUCOSE BLD-MCNC: 118 MG/DL (ref 70–99)
GLUCOSE BLD-MCNC: 143 MG/DL (ref 70–99)
PERFORMED ON: ABNORMAL
POTASSIUM SERPL-SCNC: 4.6 MMOL/L (ref 3.5–5.1)
SODIUM BLD-SCNC: 135 MMOL/L (ref 136–145)

## 2018-10-02 PROCEDURE — 6370000000 HC RX 637 (ALT 250 FOR IP): Performed by: INTERNAL MEDICINE

## 2018-10-02 PROCEDURE — 2580000003 HC RX 258: Performed by: NURSE PRACTITIONER

## 2018-10-02 PROCEDURE — 94660 CPAP INITIATION&MGMT: CPT

## 2018-10-02 PROCEDURE — 2580000003 HC RX 258: Performed by: INTERNAL MEDICINE

## 2018-10-02 PROCEDURE — 94761 N-INVAS EAR/PLS OXIMETRY MLT: CPT

## 2018-10-02 PROCEDURE — 99233 SBSQ HOSP IP/OBS HIGH 50: CPT | Performed by: INTERNAL MEDICINE

## 2018-10-02 PROCEDURE — 94640 AIRWAY INHALATION TREATMENT: CPT

## 2018-10-02 PROCEDURE — 1200000000 HC SEMI PRIVATE

## 2018-10-02 PROCEDURE — 80048 BASIC METABOLIC PNL TOTAL CA: CPT

## 2018-10-02 PROCEDURE — 94664 DEMO&/EVAL PT USE INHALER: CPT

## 2018-10-02 PROCEDURE — 36415 COLL VENOUS BLD VENIPUNCTURE: CPT

## 2018-10-02 PROCEDURE — 2700000000 HC OXYGEN THERAPY PER DAY

## 2018-10-02 RX ADMIN — Medication 1 PUFF: at 09:00

## 2018-10-02 RX ADMIN — APIXABAN 2.5 MG: 2.5 TABLET, FILM COATED ORAL at 20:41

## 2018-10-02 RX ADMIN — SODIUM CHLORIDE, PRESERVATIVE FREE 10 ML: 5 INJECTION INTRAVENOUS at 20:42

## 2018-10-02 RX ADMIN — PROPAFENONE HYDROCHLORIDE 150 MG: 150 TABLET, FILM COATED ORAL at 15:57

## 2018-10-02 RX ADMIN — CARBIDOPA AND LEVODOPA 1 TABLET: 25; 100 TABLET ORAL at 08:26

## 2018-10-02 RX ADMIN — FUROSEMIDE 40 MG: 40 TABLET ORAL at 08:25

## 2018-10-02 RX ADMIN — Medication 10 ML: at 08:27

## 2018-10-02 RX ADMIN — Medication 10 ML: at 20:42

## 2018-10-02 RX ADMIN — IPRATROPIUM BROMIDE AND ALBUTEROL SULFATE 3 ML: .5; 3 SOLUTION RESPIRATORY (INHALATION) at 09:00

## 2018-10-02 RX ADMIN — IPRATROPIUM BROMIDE AND ALBUTEROL SULFATE 3 ML: .5; 3 SOLUTION RESPIRATORY (INHALATION) at 20:21

## 2018-10-02 RX ADMIN — APIXABAN 2.5 MG: 2.5 TABLET, FILM COATED ORAL at 08:27

## 2018-10-02 RX ADMIN — PROPAFENONE HYDROCHLORIDE 150 MG: 150 TABLET, FILM COATED ORAL at 06:20

## 2018-10-02 RX ADMIN — IPRATROPIUM BROMIDE AND ALBUTEROL SULFATE 3 ML: .5; 3 SOLUTION RESPIRATORY (INHALATION) at 12:36

## 2018-10-02 RX ADMIN — METOPROLOL TARTRATE 25 MG: 25 TABLET ORAL at 20:41

## 2018-10-02 RX ADMIN — IPRATROPIUM BROMIDE AND ALBUTEROL SULFATE 3 ML: .5; 3 SOLUTION RESPIRATORY (INHALATION) at 16:03

## 2018-10-02 RX ADMIN — ATORVASTATIN CALCIUM 20 MG: 40 TABLET, FILM COATED ORAL at 08:25

## 2018-10-02 RX ADMIN — GABAPENTIN 100 MG: 100 CAPSULE ORAL at 20:41

## 2018-10-02 RX ADMIN — CARBIDOPA AND LEVODOPA 1 TABLET: 25; 100 TABLET ORAL at 20:41

## 2018-10-02 RX ADMIN — CARBIDOPA AND LEVODOPA 1 TABLET: 25; 100 TABLET ORAL at 15:57

## 2018-10-02 RX ADMIN — LAMOTRIGINE 25 MG: 25 TABLET ORAL at 20:41

## 2018-10-02 RX ADMIN — METOPROLOL TARTRATE 25 MG: 25 TABLET ORAL at 08:26

## 2018-10-02 RX ADMIN — PROPAFENONE HYDROCHLORIDE 150 MG: 150 TABLET, FILM COATED ORAL at 20:41

## 2018-10-02 RX ADMIN — Medication 1 PUFF: at 20:21

## 2018-10-02 RX ADMIN — LAMOTRIGINE 25 MG: 25 TABLET ORAL at 08:26

## 2018-10-02 RX ADMIN — DULOXETINE HYDROCHLORIDE 60 MG: 60 CAPSULE, DELAYED RELEASE ORAL at 08:26

## 2018-10-02 RX ADMIN — OLODATEROL RESPIMAT INHALATION SPRAY 2 PUFF: 2.5 SPRAY, METERED RESPIRATORY (INHALATION) at 09:02

## 2018-10-02 RX ADMIN — MONTELUKAST SODIUM 10 MG: 10 TABLET, COATED ORAL at 20:41

## 2018-10-02 ASSESSMENT — PAIN SCALES - GENERAL
PAINLEVEL_OUTOF10: 0
PAINLEVEL_OUTOF10: 0

## 2018-10-02 NOTE — PROGRESS NOTES
10/01/18 2337   NIV Type   Equipment Type V60   Mode CPAP   Mask Type Full face mask   Mask Size Medium   Settings/Measurements   Comfort Level Good   Using Accessory Muscles No   CPAP 10 cmH2O   Resp 19   SpO2 96   FiO2  30 %   Vt Exhaled 408 mL   Mask Leak (lpm) 9 lpm   Skin Protection for O2 Device Yes   Breath Sounds   Right Upper Lobe Diminished   Right Middle Lobe Diminished   Right Lower Lobe Diminished   Left Upper Lobe Diminished   Left Lower Lobe Diminished   Alarm Settings   Alarms On Y   Press Low Alarm 6 cmH2O   High Pressure Alarm 30 cmH2O   Delay Alarm 20 sec(s)   Resp Rate Low Alarm 6   High Respiratory Rate 40 br/min

## 2018-10-02 NOTE — PLAN OF CARE
Problem: Serum Glucose Level - Abnormal:  Goal: Ability to maintain appropriate glucose levels will improve  Ability to maintain appropriate glucose levels will improve   Outcome: Ongoing  Diabetes education provided today:    Foot care: advised to wash feet daily, pat dry and apply lotion at night, avoiding between toes. Need to look at feet daily and report to a physician any signs of inflammation or skin damage. Discussed diabetes shoes and socks. Nutrition as a mainstream of diabetes therapy. Rouse about label reading. Carbs: good carbs and bad carbs, importance of carb counting, incorporation of protein with each meal to reduce Glycemic index, importance of portions, Carb/insulin ratio. Fats: Good fats and bad fats, meal planning and supplements.

## 2018-10-02 NOTE — PLAN OF CARE
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-sided sciatica; CKD (chronic kidney disease) stage 3, GFR 30-59 ml/min; Community acquired pneumonia of left lower lobe of lung ([facility] CRI; Detached retina, left; Diverticulosis; DJD (degenerative joint disease); Edema; Fatty liver; GERD (gastroesophageal reflux disease); Herniated lumbar intervertebral disc; Hyperlipidemia; Hypertension; Hypothyroid; Morbid obesity with BMI of 40.0-44.9, adult ([facility] BETSY treated with BiPAP; Restless leg syndrome; Restless legs syndrome; Sleep apnea; Tremor, essential; and Type II or unspecified type diabetes mellitus without mention of complication, not stated as uncontrolled. Comorbidities reviewed and education provided. Patient and family's stated goal of care: reduce shortness of breath prior to discharge    Patient's current functional capacity:  Slight limitation of physical activity. Comfortable at rest. Ordinary physical activity results in fatigue, palpitation, dyspnea. Pt resting in bed at this time on 1 L O2. Pt denies shortness of breath. Pt with pitting lower extremity edema.  Patient's weights and intake/output reviewed:    Patient Vitals for the past 96 hrs (Last 3 readings):   Weight   10/02/18 0611 248 lb 8 oz (112.7 kg)   10/01/18 0605 248 lb 3.2 oz (112.6 kg)   09/30/18 0605 246 lb 3.2 oz (111.7 kg)       Intake/Output Summary (Last 24 hours) at 10/02/18 1758  Last data filed at 10/02/18 1754   Gross per 24 hour   Intake             1855 ml   Output             1675 ml   Net              180 ml       Patient provided with education on CHF signs/symptoms, causes, discharge medications, daily weights, low sodium diet, activity, and follow-up. Notified patient to call the doctor post discharge if patient experiences shortness of breath, chest pain, swelling, cough, or weight gain of three pounds in a day/five pounds in a week. Also notified patient to call the doctor with dizziness, increased fatigue, decreased or difficulty urinating. Pt verbalized understanding. No additional questions at this time.     Education Time: 5 Minutes

## 2018-10-02 NOTE — PROGRESS NOTES
up  tired    ROS:     Seen with-   SOB- yes  Edema- yes, getting better  Nausea/vomiting- none  Poor appetite-No  Confusion- no  Urinary complaints- no  Any other complaints- no  All other ROS are reviewed and are Negative     Medication:     Scheduled Meds:   metoprolol tartrate  25 mg Oral BID    furosemide  40 mg Oral Daily    apixaban  2.5 mg Oral BID    propafenone  150 mg Oral 3 times per day    atorvastatin  20 mg Oral Daily    beclomethasone  1 puff Inhalation BID    carbidopa-levodopa  1 tablet Oral TID    DULoxetine  60 mg Oral Daily    gabapentin  100 mg Oral Nightly    lamoTRIgine  25 mg Oral BID    montelukast  10 mg Oral Nightly    sodium chloride flush  10 mL Intravenous 2 times per day    insulin lispro  0-6 Units Subcutaneous TID WC    insulin lispro  0-3 Units Subcutaneous Nightly    olodaterol  2 puff Inhalation Daily    ipratropium-albuterol  1 vial Inhalation Q4H WA    sodium chloride flush  10 mL Intravenous 2 times per day     Continuous Infusions:   dextrose       PRN Meds:.albuterol sulfate HFA, sodium chloride flush, magnesium hydroxide, iopamidol, glucose, dextrose, glucagon (rDNA), dextrose, sodium chloride flush       Vitals :     Vitals:    10/02/18 1050   BP: 102/75   Pulse: 90   Resp: 16   Temp: 97.7 °F (36.5 °C)   SpO2: 97%       I & O :       Intake/Output Summary (Last 24 hours) at 10/02/18 1140  Last data filed at 10/02/18 1018   Gross per 24 hour   Intake             2335 ml   Output             1825 ml   Net              510 ml        Physical Examination :     General appearance: Anxious- no, distressed- no, in good spirits- no  Less lethargic, sitting up on chair  HEENT: Lips- normal, teeth- ok , oral mucosa- moist  Neck : Mass- no, appears symmetrical, JVD- not visible  Respiratory: Respiratory effort-  Labored, on oxygen, wheeze- no, crackles - yes  Cardiovascular:  Ausculation- No M/R/G, Edema trace  Abdomen: visible mass- no, distention- no, scar-

## 2018-10-02 NOTE — PROGRESS NOTES
Hospitalist Progress Note      PCP: Michael Becerra MD    Date of Admission: 9/26/2018    Chief Complaint: Shortness of breath since this morning    Hospital Course: 70 [facility].o. [facility]. She was apparently well until this am .  She developed shortness of breath upon she to call CPAP off. She uses CPAP during night for obstructive sleep apnea. She does not take oxygen during daytime. She is so short of breath on nasal cannula in the emergency room. She does not have worsening cough or colored sputum. She was recently admitted for pneumonia and treated with antibiotics. She doesn't have fever, dizziness, syncope abdominal pain nausea vomiting urinary complaints or diarrhea. She has lower extremity edema clinically worsened. There is no redness or pain over the lower extremities. She traveled to [place] in a car couple of days ago. Subjective:  No acute events overnight. No new complaints. Breathing is better.  No chp, nausea vomtiing or diarrhea     Medications:  Reviewed    Infusion Medications    dextrose       Scheduled Medications    metoprolol tartrate  25 mg Oral BID    furosemide  40 mg Oral Daily    apixaban  2.5 mg Oral BID    propafenone  150 mg Oral 3 times per day    atorvastatin  20 mg Oral Daily    beclomethasone  1 puff Inhalation BID    carbidopa-levodopa  1 tablet Oral TID    DULoxetine  60 mg Oral Daily    gabapentin  100 mg Oral Nightly    lamoTRIgine  25 mg Oral BID    montelukast  10 mg Oral Nightly    sodium chloride flush  10 mL Intravenous 2 times per day    insulin lispro  0-6 Units Subcutaneous TID     insulin lispro  0-3 Units Subcutaneous Nightly    olodaterol  2 puff Inhalation Daily    ipratropium-albuterol  1 vial Inhalation Q4H WA    sodium chloride flush  10 mL Intravenous 2 times per day     PRN Meds: albuterol sulfate HFA, sodium chloride flush, magnesium hydroxide, iopamidol, glucose,

## 2018-10-02 NOTE — PROGRESS NOTES
10/02/18 0332   NIV Type   $NIV $Daily Charge   Equipment Type V60   Mode CPAP   Mask Type Full face mask   Mask Size Medium   Settings/Measurements   Comfort Level Good   Using Accessory Muscles No   CPAP 10 cmH2O   Resp 19   SpO2 96   FiO2  30 %   Vt Exhaled 396 mL   Mask Leak (lpm) 5 lpm   Skin Protection for O2 Device Yes   Breath Sounds   Right Upper Lobe Diminished   Right Middle Lobe Diminished   Right Lower Lobe Diminished   Left Upper Lobe Diminished   Left Lower Lobe Diminished   Alarm Settings   Alarms On Y   Press Low Alarm 6 cmH2O   High Pressure Alarm 30 cmH2O   Delay Alarm 20 sec(s)   Resp Rate Low Alarm 6   High Respiratory Rate 40 br/min

## 2018-10-03 VITALS
WEIGHT: 247.6 LBS | SYSTOLIC BLOOD PRESSURE: 142 MMHG | RESPIRATION RATE: 18 BRPM | HEIGHT: 66 IN | HEART RATE: 67 BPM | TEMPERATURE: 97.4 F | BODY MASS INDEX: 39.79 KG/M2 | DIASTOLIC BLOOD PRESSURE: 73 MMHG | OXYGEN SATURATION: 97 %

## 2018-10-03 LAB
ANION GAP SERPL CALCULATED.3IONS-SCNC: 8 MMOL/L (ref 3–16)
BUN BLDV-MCNC: 68 MG/DL (ref 7–20)
CALCIUM SERPL-MCNC: 9.6 MG/DL (ref 8.3–10.6)
CHLORIDE BLD-SCNC: 93 MMOL/L (ref 99–110)
CO2: 38 MMOL/L (ref 21–32)
CREAT SERPL-MCNC: 1.8 MG/DL (ref 0.6–1.2)
GFR AFRICAN AMERICAN: 33
GFR NON-AFRICAN AMERICAN: 27
GLUCOSE BLD-MCNC: 104 MG/DL (ref 70–99)
GLUCOSE BLD-MCNC: 107 MG/DL (ref 70–99)
GLUCOSE BLD-MCNC: 108 MG/DL (ref 70–99)
PERFORMED ON: ABNORMAL
PERFORMED ON: ABNORMAL
POTASSIUM SERPL-SCNC: 5 MMOL/L (ref 3.5–5.1)
SODIUM BLD-SCNC: 139 MMOL/L (ref 136–145)

## 2018-10-03 PROCEDURE — 36415 COLL VENOUS BLD VENIPUNCTURE: CPT

## 2018-10-03 PROCEDURE — 2700000000 HC OXYGEN THERAPY PER DAY

## 2018-10-03 PROCEDURE — 80048 BASIC METABOLIC PNL TOTAL CA: CPT

## 2018-10-03 PROCEDURE — 2580000003 HC RX 258: Performed by: NURSE PRACTITIONER

## 2018-10-03 PROCEDURE — 6370000000 HC RX 637 (ALT 250 FOR IP): Performed by: INTERNAL MEDICINE

## 2018-10-03 PROCEDURE — 94640 AIRWAY INHALATION TREATMENT: CPT

## 2018-10-03 PROCEDURE — 94761 N-INVAS EAR/PLS OXIMETRY MLT: CPT

## 2018-10-03 PROCEDURE — 2580000003 HC RX 258: Performed by: INTERNAL MEDICINE

## 2018-10-03 PROCEDURE — 94660 CPAP INITIATION&MGMT: CPT

## 2018-10-03 PROCEDURE — 97116 GAIT TRAINING THERAPY: CPT

## 2018-10-03 PROCEDURE — 97110 THERAPEUTIC EXERCISES: CPT

## 2018-10-03 PROCEDURE — 94668 MNPJ CHEST WALL SBSQ: CPT

## 2018-10-03 PROCEDURE — 94664 DEMO&/EVAL PT USE INHALER: CPT

## 2018-10-03 RX ORDER — PROPAFENONE HYDROCHLORIDE 150 MG/1
150 TABLET, FILM COATED ORAL EVERY 8 HOURS SCHEDULED
Qty: 90 TABLET | Refills: 3 | Status: SHIPPED | OUTPATIENT
Start: 2018-10-03 | End: 2019-01-10 | Stop reason: SDUPTHER

## 2018-10-03 RX ORDER — FUROSEMIDE 40 MG/1
40 TABLET ORAL DAILY
Qty: 60 TABLET | Refills: 3 | Status: ON HOLD | OUTPATIENT
Start: 2018-10-04 | End: 2019-05-28 | Stop reason: HOSPADM

## 2018-10-03 RX ADMIN — IPRATROPIUM BROMIDE AND ALBUTEROL SULFATE 3 ML: .5; 3 SOLUTION RESPIRATORY (INHALATION) at 08:59

## 2018-10-03 RX ADMIN — FUROSEMIDE 40 MG: 40 TABLET ORAL at 10:08

## 2018-10-03 RX ADMIN — OLODATEROL RESPIMAT INHALATION SPRAY 2 PUFF: 2.5 SPRAY, METERED RESPIRATORY (INHALATION) at 09:01

## 2018-10-03 RX ADMIN — DULOXETINE HYDROCHLORIDE 60 MG: 60 CAPSULE, DELAYED RELEASE ORAL at 10:08

## 2018-10-03 RX ADMIN — PROPAFENONE HYDROCHLORIDE 150 MG: 150 TABLET, FILM COATED ORAL at 15:04

## 2018-10-03 RX ADMIN — PROPAFENONE HYDROCHLORIDE 150 MG: 150 TABLET, FILM COATED ORAL at 10:27

## 2018-10-03 RX ADMIN — SODIUM CHLORIDE, PRESERVATIVE FREE 10 ML: 5 INJECTION INTRAVENOUS at 10:08

## 2018-10-03 RX ADMIN — ATORVASTATIN CALCIUM 20 MG: 40 TABLET, FILM COATED ORAL at 10:07

## 2018-10-03 RX ADMIN — APIXABAN 2.5 MG: 2.5 TABLET, FILM COATED ORAL at 10:08

## 2018-10-03 RX ADMIN — CARBIDOPA AND LEVODOPA 1 TABLET: 25; 100 TABLET ORAL at 15:03

## 2018-10-03 RX ADMIN — CARBIDOPA AND LEVODOPA 1 TABLET: 25; 100 TABLET ORAL at 10:07

## 2018-10-03 RX ADMIN — Medication 10 ML: at 10:10

## 2018-10-03 RX ADMIN — Medication 1 PUFF: at 08:59

## 2018-10-03 RX ADMIN — METOPROLOL TARTRATE 25 MG: 25 TABLET ORAL at 10:08

## 2018-10-03 RX ADMIN — LAMOTRIGINE 25 MG: 25 TABLET ORAL at 10:08

## 2018-10-03 RX ADMIN — IPRATROPIUM BROMIDE AND ALBUTEROL SULFATE 3 ML: .5; 3 SOLUTION RESPIRATORY (INHALATION) at 15:51

## 2018-10-03 NOTE — PROGRESS NOTES
Patient's O2 on 1L at rest 96% and O2 on room air at 92%. Attempted ambulating with O2 but patient became weak and unsteady. Patient requested PT. PT notified and will be up to see patient.

## 2018-10-03 NOTE — PROGRESS NOTES
Nutrition Assessment    Type and Reason for Visit: Initial    Malnutrition Assessment:  · Malnutrition Status: At risk for malnutrition    Nutrition Diagnosis:   · Problem: Food and nutrition-related knowledge deficit  · Etiology: Lack of prior nutrition-related education    Signs and symptoms: Diet history of poor intake    Nutrition Assessment:  · Subjective Assessment: LOS assessment. Pt is a 71 yo female with a Hx of CKD, DM, GERD, HTN, HLD, who was admitted with SOB. Currently on a carb control, 2 gm sodium diet with 2000 ml FR. Intakes % per EMR. Pt endorses that her appetite is \"too good\". Pt states that her appetite was good PTA as well. Pt states that she tried dieting PTA to lose weight and lost ~20 lb. Weight change this admission 2/2 fluid changes. Pt denied having any questions regarding CHF diet education. Nutrition Risk Level   Risk Level: Low    Nutrition Intervention  Food and/or Delivery: Continue current diet  Nutrition Education/Counseling/Coordination of Care:  Continued Inpatient Monitoring, Education Completed    Patient assessed for nutrition risk. Deemed to be at low risk at this time. Will continue to follow patient.       Electronically signed by Silvia Gutierrez RD, LD on 10/3/18 at 11:01 AM    Contact Number: [phone]

## 2018-10-03 NOTE — DISCHARGE SUMMARY
Rythmol and eliquis for [facility]   - tele, will need follow up with Cardiology   - CHADS-VASC 6      Acute Kidney Injury on CKD III  - Nephrology consulted and following  - monitor renal function    - 10/3 renal  Has s/o need f/u 4 weeks      Diabetes mellitus-not on medication, monitor blood sugar with low correction scale and hemoglobin A1c 6.5      Sleep apnea-continue CPAP overnight     Parkinson's with resting tremor-continue with Sinemet     Hypertension-home medications losartan DC 2/2 to kidney issues   - meds lasix  and labetalol      Hyperlipidemia-statin     asthma-no  Exacerbation-continue when necessary inhalers, Singulair      Deconditioning debility 2/2 to acute illness   - seen by PT OT - rec for SNF pt agreeable DC to SNF     Physical Exam Performed:     /75   Pulse 77   Temp 98 °F (36.7 °C) (Oral)   Resp 20   Ht 5' 6\" (1.676 m)   Wt 247 lb 9.6 oz (112.3 kg)   SpO2 98%   BMI 39.96 kg/m²       General appearance:  No apparent distress, appears stated age and cooperative. HEENT:  Normal cephalic, atraumatic without obvious deformity. Pupils equal, round, and reactive to light. Extra ocular muscles intact. Conjunctivae/corneas clear. Neck: Supple, with full range of motion. No jugular venous distention. Trachea midline. Respiratory:  Normal respiratory effort. Clear to auscultation, bilaterally without Rales/Wheezes/Rhonchi. Cardiovascular:  Regular rate and rhythm with normal S1/S2 without murmurs, rubs or gallops. Abdomen: Soft, non-tender, non-distended with normal bowel sounds. Musculoskeletal:  No clubbing, cyanosis or edema bilaterally. Full range of motion without deformity. Skin: Skin color, texture, turgor normal.  No rashes or lesions. Neurologic:  Neurovascularly intact without any focal sensory/motor deficits.  Cranial nerves: II-XII intact, grossly non-focal.  Psychiatric:  Alert and oriented, thought content appropriate, normal insight  Capillary Refill: Brisk,< 3 seconds

## 2018-10-03 NOTE — PLAN OF CARE
medications, daily weights, low sodium diet, activity, and follow-up. Notified patient to call the doctor post discharge if patient experiences shortness of breath, chest pain, swelling, cough, or weight gain of three pounds in a day/five pounds in a week. Also notified patient to call the doctor with dizziness, increased fatigue, decreased or difficulty urinating. Pt verbalized understanding. No additional questions at this time.     Education Time: 10 Minutes

## 2018-10-03 NOTE — PROGRESS NOTES
the last 72 hours. No results for input(s): Adi De Leon in the last 72 hours. Urinalysis:      Lab Results   Component Value Date    NITRU Negative 09/08/2018    WBCUA 0-2 09/08/2018    BACTERIA Rare 09/08/2018    RBCUA None seen 09/08/2018    BLOODU Negative 09/08/2018    SPECGRAV 1.020 09/08/2018    GLUCOSEU Negative 09/08/2018       Radiology:  VL Extremity Venous Bilateral   Final Result      NM LUNG VENT/PERFUSION (VQ)   Final Result   Low probability for pulmonary embolus. XR CHEST PORTABLE   Final Result   1. Hazy bibasilar airspace disease, right greater than left, representing   either atelectasis, pneumonia, or asymmetric edema. 2. Small bilateral pleural effusions. 3. Stable cardiomegaly.                  Assessment/Plan:    Active Hospital Problems    Diagnosis Date Noted    FARHEEN (acute kidney injury) ([facility] [N17.9]     PAF (paroxysmal atrial fibrillation) ([facility]) [I48.0]     CHF (congestive heart failure), NYHA class I, acute on chronic, combined ([facility] [I50.43] 09/26/2018       Acute Respiratory failure in the setting of acute diastolic heart failure with PHTN: improved   - PE unable r/o as could not have CT and Dopplers negative VQ scan neg, w/o signs of infectious process  - ECHO normal EF with Gr I DD, mod severe PHTN SPAP 51 mmHg   -  Cont Diuresis, per Card and Nephrology  -  I/O, daily weights   -  Currently on PO lasix    - cont supplemental O2 wean as tolerated, check to see if will require home O2      AF with RVR- resolved   - started on  Rythmol and eliquis for [facility]   - tele, will need follow up with Cardiology   - CHADS-VASC 6     Acute Kidney Injury on CKD III  - Nephrology consulted and following  - monitor renal function    - 10/3 renal  Has s/o need f/u 4 weeks     Diabetes mellitus-not on medication, monitor blood sugar with low correction scale and hemoglobin A1c 6.5      Sleep apnea-continue CPAP overnight     Parkinson's with resting tremor-continue with

## 2018-10-03 NOTE — PROGRESS NOTES
10/03/18 0352   NIV Type   $NIV $Daily Charge   NIV Started Yes   Equipment Type v60   Mode CPAP   Mask Type Full face mask   Mask Size Medium   Settings/Measurements   Comfort Level Good   Using Accessory Muscles No   CPAP 10 cmH2O   Resp 21   SpO2 98   FiO2  30 %   Vt Exhaled 610 mL   Mask Leak (lpm) 9 lpm   Skin Protection for O2 Device Yes   Alarm Settings   Alarms On Y   Press Low Alarm 6 cmH2O   High Pressure Alarm 30 cmH2O   Resp Rate Low Alarm 6   High Respiratory Rate 40 br/min

## 2018-10-03 NOTE — PROGRESS NOTES
[facility]    [facility]              [phone]                       Plan :     Cr coming down  Dispo- from renal point of view: OK to dc from renal perspective  Follow up labs- home health to Fax at [phone]   Nephrology Follow up- 4 weeks  DC instruction updated  Outpatient medicine changed by nephrology: DCed losartan due to hypotension FARHEEN,   metoprolol decreased due to hypotension       Assessment :     Acute Kidney Injury on CKD III  Cr peaked to 2.2 from 1 on  Admission   down to 1.8 now  Making urine with lasix  Likely hypotension, cardiorenal syndrome    UA- bland, protein 100    HFpEF  Admitted with sob  Got lasix and metolazone, now off metolazone  And continues on lasix  Echo: 9/27/18- EF normal, Grade I DD  Wt peaked to 268 ibs- now 247 ibs    Hypotension in patient with known Hypertension  BP lowes 80s this hospitalization- now coming up  Normally hypertensive  DCed losartan on 9/30/18- will consider starting as   outpatient  Metoprolol reduced on 10/1/18  Has Afib      BETSY- on CPAP  Parkinson's disease, on [address] Nephrology would like to thank Mari Ahn MD   for opportunity to serve this patient      Please call with questions at-   24 Hrs Answering service [phone] or  7 am- 5 pm via Perfect serve or cell phone  Dr.Sudhir Lolis Borwn          CC/reason for consult :     FARHEEN     HPI :     From consult note-   Janay Tinajero is a 70 y.o. female presented to   the hospital on 9/26/2018 with shortness of breath. Happened suddenly when she work up. Known to  Have pneumonia, for which she was in the hospital  Recently. No other complaints. Her SOB is getting  Better. Meanwhile, we are consulted for FARHEEN.     Denies any renal problem in the past, and denies  Follow up with nephrologist.  She was being treated for CHF with multiple diuretics,  Not metolazone DCed and lasix decreased     Interval History:     BP better  No dizziness  Making

## 2018-10-03 NOTE — CARE COORDINATION
[address] Transitions Interview     2018    Patient: Damari Shin Patient : 1940   MRN: [id number]  Reason for Admission: CHF  RARS: Readmission Risk Score: 12       Spoke with: patient Krystyna Morgan      Readmission Risk  Patient Active Problem List   Diagnosis    GERD (gastroesophageal reflux disease)    Essential hypertension    Hyperlipidemia    Restless leg syndrome    Chronic normocytic anemia    Asthma exacerbation    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    DM2 (diabetes mellitus, type 2) ([facility]    Moderate persistent asthma    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-sided sciatica    Morbid obesity with BMI of 40.0-44.9, adult ([facility]    CAD (coronary artery disease)    Hx stage 2 sacral decubitus ulcer    [facility]'s disease ([facility]    BETSY treated with BiPAP    Hypothyroidism    Hepatic steatosis    Diverticulosis    Blind left eye    L carotid artery stenosis s/p CEA    Cervical spinal cord compression s/p ACDF    Sepsis (HCC)    Elevated BUN:Cr ratio    Elevated brain natriuretic peptide (BNP) level    Community acquired pneumonia of left lower lobe of lung (HCC)    Acute hypoxemic respiratory failure (HCC)    Chronic diastolic CHF (grade 2 LVDD)    Chest tightness    CHF (congestive heart failure), NYHA class I, acute on chronic, combined [facility])       Inpatient Assessment  Care Transitions Summary    Care Transitions Inpatient Review  Medication Review  Do you have all of your prescriptions and are they filled?:  Yes   Barriers to Medication Adherence:  None  Are you able to afford your medications?:  Yes  How often do you have difficulty taking your medications?:  I always take them as prescribed.   Housing Review  Who do you live with?:  Partner/Spouse/SO  Are you an active caregiver in your home?:  No  Social Support  Do you have a ?:  No  Do you have a [facility]?:  No  Durable Medical Equipment  Patient DME:  Shower chair, Pearl Reynolds,
[facility]    Spoke with pt and pt's spouse at bedside about [facility], all questions answered. Pt and spouse are agreeable to [address] [place] [address] Program. Demographics verified. Orders will be faxed to [facility] upon DC. -If pt DC's over wkend please send facesheet, order, kennedi and H&P to [facility]- [phone].      Romina Taylor RN CTN with [facility] [address]  PLO:[phone]
[facility]    Referral received from CM. CHF RN request for pt to be set up with Suzan Recio upon DC. I will continue to follow patient for needs, and arrange [facility] services prior to DC. Should pt dc over the weekend please fax facesheet, order, MICA, and H&P to [facility].      Espinoza Quinonez RN CTN with [facility] [address]  BPL:[phone]
Referral to [facility] in admissions familiar with patient and family and will likely be able to accept today if medically ready. She will call back with official decision once she reviews medications.
Wheelchair  Patient Home Equipment:  Nebulizer, BiPAP  Functional Review  Ability to seek help/take action for Emergent/Urgent situations i.e. fire, crime, inclement weather or health crisis. :  Independent  Ability handle personal hygiene needs (bathing/dressing/grooming): Independent  Ability to manage medications: Independent  Ability to prepare food:  Needs Assistance  Ability to maintain home (clean home, laundry):  Needs Assistance  Ability to drive and/or has transportation:  Needs Assistance  Ability to do shopping:  Needs Assistance  Ability to manage finances:  Needs Assistance  Hearing and Vision  Care Transitions Interventions         Follow Up  Future Appointments  Date Time Provider Katy Booker   10/4/2018 9:20 AM BIJAN Villegas FP [facility]   10/30/2018 1:00 PM MD BIGG Wilhelm [facility]   11/14/2018 10:00 AM MAHAD Anderson CNP [facility]   2/4/2019 10:15 AM MD BIGG Wilhelm [facility]       Health Maintenance  There are no preventive care reminders to display for this patient.     Dmitry Hyman RN

## 2018-10-04 ENCOUNTER — TELEPHONE (OUTPATIENT)
Dept: FAMILY MEDICINE CLINIC | Age: 78
End: 2018-10-04

## 2018-10-08 LAB
BUN BLDV-MCNC: 61 MG/DL
CALCIUM SERPL-MCNC: 8.8 MG/DL
CHLORIDE BLD-SCNC: 101 MMOL/L
CO2: 32 MMOL/L
CREAT SERPL-MCNC: 2 MG/DL
GFR CALCULATED: 29
GLUCOSE BLD-MCNC: 101 MG/DL
POTASSIUM SERPL-SCNC: 4.9 MMOL/L
SODIUM BLD-SCNC: 140 MMOL/L

## 2018-10-11 DIAGNOSIS — J45.901 ASTHMA WITH ACUTE EXACERBATION, UNSPECIFIED ASTHMA SEVERITY, UNSPECIFIED WHETHER PERSISTENT: ICD-10-CM

## 2018-10-12 RX ORDER — IPRATROPIUM BROMIDE AND ALBUTEROL SULFATE 2.5; .5 MG/3ML; MG/3ML
SOLUTION RESPIRATORY (INHALATION)
Qty: 360 ML | Refills: 3 | Status: ON HOLD | OUTPATIENT
Start: 2018-10-12 | End: 2018-12-11 | Stop reason: SDUPTHER

## 2018-10-22 ENCOUNTER — TELEPHONE (OUTPATIENT)
Dept: FAMILY MEDICINE CLINIC | Age: 78
End: 2018-10-22

## 2018-10-23 ENCOUNTER — OFFICE VISIT (OUTPATIENT)
Dept: CARDIOLOGY CLINIC | Age: 78
End: 2018-10-23
Payer: MEDICARE

## 2018-10-23 ENCOUNTER — TELEPHONE (OUTPATIENT)
Dept: FAMILY MEDICINE CLINIC | Age: 78
End: 2018-10-23

## 2018-10-23 VITALS
DIASTOLIC BLOOD PRESSURE: 78 MMHG | HEART RATE: 61 BPM | BODY MASS INDEX: 39.7 KG/M2 | SYSTOLIC BLOOD PRESSURE: 122 MMHG | OXYGEN SATURATION: 96 % | HEIGHT: 66 IN | WEIGHT: 247 LBS

## 2018-10-23 DIAGNOSIS — N18.2 CKD (CHRONIC KIDNEY DISEASE) STAGE 2, GFR 60-89 ML/MIN: Chronic | ICD-10-CM

## 2018-10-23 DIAGNOSIS — G47.33 OSA TREATED WITH BIPAP: Chronic | ICD-10-CM

## 2018-10-23 DIAGNOSIS — I50.32 CHRONIC DIASTOLIC CONGESTIVE HEART FAILURE (HCC): ICD-10-CM

## 2018-10-23 DIAGNOSIS — I48.0 PAF (PAROXYSMAL ATRIAL FIBRILLATION) (HCC): Primary | ICD-10-CM

## 2018-10-23 DIAGNOSIS — I10 ESSENTIAL HYPERTENSION: Chronic | ICD-10-CM

## 2018-10-23 DIAGNOSIS — E78.2 MIXED HYPERLIPIDEMIA: Chronic | ICD-10-CM

## 2018-10-23 PROCEDURE — G8482 FLU IMMUNIZE ORDER/ADMIN: HCPCS | Performed by: NURSE PRACTITIONER

## 2018-10-23 PROCEDURE — 4040F PNEUMOC VAC/ADMIN/RCVD: CPT | Performed by: NURSE PRACTITIONER

## 2018-10-23 PROCEDURE — 1090F PRES/ABSN URINE INCON ASSESS: CPT | Performed by: NURSE PRACTITIONER

## 2018-10-23 PROCEDURE — 1101F PT FALLS ASSESS-DOCD LE1/YR: CPT | Performed by: NURSE PRACTITIONER

## 2018-10-23 PROCEDURE — 1036F TOBACCO NON-USER: CPT | Performed by: NURSE PRACTITIONER

## 2018-10-23 PROCEDURE — G8598 ASA/ANTIPLAT THER USED: HCPCS | Performed by: NURSE PRACTITIONER

## 2018-10-23 PROCEDURE — G8417 CALC BMI ABV UP PARAM F/U: HCPCS | Performed by: NURSE PRACTITIONER

## 2018-10-23 PROCEDURE — G8400 PT W/DXA NO RESULTS DOC: HCPCS | Performed by: NURSE PRACTITIONER

## 2018-10-23 PROCEDURE — 1111F DSCHRG MED/CURRENT MED MERGE: CPT | Performed by: NURSE PRACTITIONER

## 2018-10-23 PROCEDURE — G8427 DOCREV CUR MEDS BY ELIG CLIN: HCPCS | Performed by: NURSE PRACTITIONER

## 2018-10-23 PROCEDURE — 1123F ACP DISCUSS/DSCN MKR DOCD: CPT | Performed by: NURSE PRACTITIONER

## 2018-10-23 PROCEDURE — 99213 OFFICE O/P EST LOW 20 MIN: CPT | Performed by: NURSE PRACTITIONER

## 2018-10-23 NOTE — TELEPHONE ENCOUNTER
Linda with [facility] [address] [address] [phone])HYFPWV and asked to sign home care orders for skilled nursing occup. Therapy and speech therapy. Please advise.

## 2018-10-23 NOTE — PATIENT INSTRUCTIONS
1. No change in current medicines  2. Will call and get blood test results from [facility]  3. Continue with the 2 liter fluid allowance  4.  Follow up with me in 1 month

## 2018-10-23 NOTE — PROGRESS NOTES
normal filling pressure. Mild to moderate mitral regurgitation. The left atrium is mildly dilated. Moderate tricuspid regurgitation. Systolic pulmonic artery pressure (SPAP) is estimated at 51 mmHg consistent with mild pulmonary hypertension (Right atrial pressure of 3 mmHg). Compared to previous study from 3- no changes noted. Echo 3/17/2017:  No change from echo done 3/4/2014. Left ventricular systolic function is normal with the ejection fraction estimated at 55%. No regional wall motion abnormalities. Grade II diastolic dysfunction with elevated filling pressure. Mild concentric left ventricular hypertrophy with a sigmoid septum. No outflow obstruction. Left ventricle size is normal.   Mild mitral annular calcification. Mitral valve leaflets are structurally normal.   Calcification in the chordae of the mitral valve. Mild mitral valve regurgitation. Aortic valve leaflets are mildly thickened with normal opening. No aortic valve regurgitation. Echo 3/4/2014:  Overall left ventricular function is normal. Ejection fraction is visually estimated to be 55-60 %. Moderate concentric left ventricular hypertrophy is present. Slight calcification of the mitral valve noted. Mild mitral regurgitation is present. Dilated left atrium with increased left atrial volume. Assessment:    1. PAF (paroxysmal atrial fibrillation) ([address]    2. Chronic diastolic congestive heart failure ([address]    3. Essential hypertension    4. Mixed hyperlipidemia    5. 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    6. BETSY treated with BiPAP          Plan:   1. No change in current medicines  2. Will call and get blood test results from [facility]  3. Continue with the 2 liter fluid allowance  4. Follow up with me in 1 month      I appreciate the opportunity of cooperating in the care of this individual.    Kelly Rothman CNP, 10/23/2018, 2:38 PM    QUALITY MEASURES  1. Tobacco Cessation Counseling: NA  2.  Retake of BP if

## 2018-10-23 NOTE — LETTER
[address] Cardiology - [facility] [place] [address] [address] [address]  Phone: [phone]  Fax: 147 [facility], APRN - [facility]        October 25, 2018     Armida King [address] [place] [address]    Patient: Solis Zhang  MR Number: [id number]  YOB: 1940  Date of Visit: 10/23/2018    Dear Dr. Natahlie Kennedy:     Below are the relevant portions of my assessment and plan of care. [employer]   Cardiac Evaluation    Primary Care Doctor:  Nathalie Kennedy MD    Chief Complaint   Patient presents with    Follow-Up from Hospital     3 weeks ago    Shortness of Breath        History of Present Illness:   I had the pleasure of seeing Solis Zhang in follow up for recent hospitalization. Solis Zhang was admitted with shortness of breath. She was diuresed for CHF (268 lbs -> 247 lbs). She developed Afib and started on propafenone and anticoagulation. With diuresis she developed FARHEEN and hypotension. The ARB losartan was stopped and metoprolol dose decreased. She has a hx of dCHF, HTN, HLD, DM, BETSY on CPAP, PAD s/p R -CEA, CKD and Parkinsons. She was discharged to [facility] but is now home. She is doing better today. She still has shortness of breath with exertion and at rest.  Her O2 sats will drop into the 80's even at rest.  She only uses O2 with bipap at night. Her weight has dropped from 247 lbs to 243 lbs this am at home. Her BP at home stays 148/62 to 166/65, pulse in 50's (AM readings prior to AM meds). She denies any palpitations. Her sleep is good, 1 X nocturia. She reports a sinus headache but no lightheadedness. Plans to see dentist for cleaning, routinely takes antibiotic pre due to hx of rheumatic fever. Solis Zhang describes symptoms including dyspnea, fatigue, edema but denies chest pain, palpitations, orthopnea, PND, early saiety, syncope.      Past Medical History: needed with Duoneb. 3/5/18  Yes BIJAN Hsu   lamoTRIgine (LAMICTAL) 25 MG tablet Take 50 mg by mouth 2 times daily    Yes Historical Provider, MD   Multiple Vitamins-Minerals (THERAPEUTIC MULTIVITAMIN-MINERALS) tablet Take 1 tablet by mouth daily   Yes Historical Provider, MD   Alpha-Lipoic Acid 300 MG CAPS Take 300 mg by mouth daily   Yes Historical Provider, MD   Lift Chair MISC Use daily due to leg weakness. 12/14/15  Yes Javon Manuel MD   Handicap Placard MISC by Does not apply route. 12/12/14-12/12/19 12/12/14  Yes Javon Manuel MD   aspirin EC 81 MG EC tablet Take 1 tablet by mouth daily. Start taking next week. Indications: OTC 2/25/13  Yes Zoltan Meyer MD   solifenacin (VESICARE) 5 MG tablet Take 1 tablet by mouth daily. 6/25/13 10/29/13  Javon Manuel MD        Allergies:  Pyridium [phenazopyridine hcl]; Reglan [metoclopramide hcl]; and Reglan [metoclopramide hcl]     Review of Systems:   · Constitutional: there has been no unanticipated weight loss. · Eyes: No vision changes  · ENT: No Headaches, no nasal congestion. No mouth sores or sore throat. · Cardiovascular: Reviewed in HPI  · Respiratory: No cough or wheezing, no sputum production. · Gastrointestinal: No abdominal pain, no constipation or diarrhea  · Genitourinary: No dysuria, trouble voiding, or hematuria. · Musculoskeletal:  No weakness or joint complaints. · Integumentary: No rash or pruritis. · Neurological: No numbness or tingling. No weakness. No tremor. · Psychiatric: No anxiety, no depression. · Endocrine:  No excessive thirst or urination. · Hematologic/Lymphatic: No abnormal bruising or bleeding, blood clots or swollen lymph nodes.       Physical Examination:    Vitals:    10/23/18 1419   BP: 122/78   Pulse: 61   SpO2: 96%   Weight: 247 lb (112 kg)   Height: 5' 6\" (1.676 m)        Constitutional and General Appearance: Warm and dry, no apparent distress, normal coloration

## 2018-10-25 ENCOUNTER — TELEPHONE (OUTPATIENT)
Dept: CARDIOLOGY CLINIC | Age: 78
End: 2018-10-25

## 2018-10-25 ENCOUNTER — TELEPHONE (OUTPATIENT)
Dept: FAMILY MEDICINE CLINIC | Age: 78
End: 2018-10-25

## 2018-10-25 NOTE — TELEPHONE ENCOUNTER
Basic Metabolic Panel   Order: [id number]   Status:  Final result   Visible to patient:  Yes (MyChart)   Notes recorded by MAHAD Maloney CNP on 10/25/2018 at 12:22 PM EDT  Blood tests reviewed, stable.  Continue present management.    Thanks, Ace Sutton               Left message to call for results

## 2018-10-25 NOTE — TELEPHONE ENCOUNTER
Nursing calling reporting a stage I ulcer on pts bottom. Asking for verbal orders to treat, change dressing every 3 days and as needed and Mepilex.

## 2018-10-25 NOTE — COMMUNICATION BODY
mouth daily. . 7/30/18 10/28/18 Yes Aleksandar Toscano MD   carbidopa-levodopa (SINEMET)  MG per tablet TAKE 1 TABLET BY MOUTH 3 TIMES A DAY 7/30/18  Yes Aleksandar Toscano MD   atorvastatin (LIPITOR) 40 MG tablet Take 1 and 1/2 tablets by mouth every evening 7/30/18  Yes Aleksandar Toscano MD   montelukast (SINGULAIR) 10 MG tablet Take 1 tablet by mouth daily. 7/30/18  Yes Aleksandar Toscano MD   celecoxib (CELEBREX) 200 MG capsule Take 1 capsule by mouth daily. 7/30/18  Yes Aleksandar Toscano MD   DULoxetine (CYMBALTA) 60 MG extended release capsule Take 1 capsule by mouth daily 7/30/18  Yes Aleksandar Toscano MD   albuterol sulfate HFA (VENTOLIN HFA) 108 (90 Base) MCG/ACT inhaler Inhale 2 puffs into the lungs every 6 hours as needed for Wheezing 7/30/18  Yes Aleksandar Toscano MD   fluticasone (FLONASE) 50 MCG/ACT nasal spray USE 2 PUFFS IN EACH NOSTRIL DAILY 7/24/18  Yes Aleksandar Toscano MD   salmeterol (SEREVENT) 50 MCG/DOSE diskus inhaler Inhale 1 puff by mouth 2 times a day. 5/11/18  Yes Aleksandar Toscano MD   budesonide (PULMICORT FLEXHALER) 180 MCG/ACT AEPB inhaler Inhale 2 puffs into the lungs 2 times daily 5/11/18  Yes Aleksandar Toscano MD   [facility]. Devices (FOAM CHAIR CUSHION) MISC Seat cushion skin protection width 22 inches or greater  Diagnosis: Stage 2 sacral decubitus ulcer, L89.152  Duration: 99 4/13/18  Yes Aleksandar Toscano MD   Respiratory Therapy Supplies (NEBULIZER COMPRESSOR) KIT Use Q6 hours as needed with Duoneb. 3/5/18  Yes BIJAN Turk   lamoTRIgine (LAMICTAL) 25 MG tablet Take 50 mg by mouth 2 times daily    Yes Historical Provider, MD   Multiple Vitamins-Minerals (THERAPEUTIC MULTIVITAMIN-MINERALS) tablet Take 1 tablet by mouth daily   Yes Historical Provider, MD   Alpha-Lipoic Acid 300 MG CAPS Take 300 mg by mouth daily   Yes Historical Provider, MD   Lift Chair MISC Use daily due to leg weakness. 12/14/15  Yes Aleksandar Toscano MD   Handicap Placard MISC by Does not apply route.

## 2018-10-30 ENCOUNTER — OFFICE VISIT (OUTPATIENT)
Dept: FAMILY MEDICINE CLINIC | Age: 78
End: 2018-10-30
Payer: MEDICARE

## 2018-10-30 VITALS
OXYGEN SATURATION: 94 % | HEART RATE: 64 BPM | WEIGHT: 246 LBS | BODY MASS INDEX: 39.53 KG/M2 | DIASTOLIC BLOOD PRESSURE: 52 MMHG | SYSTOLIC BLOOD PRESSURE: 138 MMHG | HEIGHT: 66 IN

## 2018-10-30 DIAGNOSIS — I50.32 CHRONIC DIASTOLIC CONGESTIVE HEART FAILURE (HCC): Primary | ICD-10-CM

## 2018-10-30 DIAGNOSIS — G20 PARKINSON'S DISEASE (HCC): ICD-10-CM

## 2018-10-30 DIAGNOSIS — N17.9 AKI (ACUTE KIDNEY INJURY) (HCC): ICD-10-CM

## 2018-10-30 PROCEDURE — 1090F PRES/ABSN URINE INCON ASSESS: CPT | Performed by: INTERNAL MEDICINE

## 2018-10-30 PROCEDURE — 1036F TOBACCO NON-USER: CPT | Performed by: INTERNAL MEDICINE

## 2018-10-30 PROCEDURE — G8427 DOCREV CUR MEDS BY ELIG CLIN: HCPCS | Performed by: INTERNAL MEDICINE

## 2018-10-30 PROCEDURE — 4040F PNEUMOC VAC/ADMIN/RCVD: CPT | Performed by: INTERNAL MEDICINE

## 2018-10-30 PROCEDURE — 1111F DSCHRG MED/CURRENT MED MERGE: CPT | Performed by: INTERNAL MEDICINE

## 2018-10-30 PROCEDURE — G8482 FLU IMMUNIZE ORDER/ADMIN: HCPCS | Performed by: INTERNAL MEDICINE

## 2018-10-30 PROCEDURE — 1101F PT FALLS ASSESS-DOCD LE1/YR: CPT | Performed by: INTERNAL MEDICINE

## 2018-10-30 PROCEDURE — 1123F ACP DISCUSS/DSCN MKR DOCD: CPT | Performed by: INTERNAL MEDICINE

## 2018-10-30 PROCEDURE — 99214 OFFICE O/P EST MOD 30 MIN: CPT | Performed by: INTERNAL MEDICINE

## 2018-10-30 PROCEDURE — G8400 PT W/DXA NO RESULTS DOC: HCPCS | Performed by: INTERNAL MEDICINE

## 2018-10-30 PROCEDURE — G8417 CALC BMI ABV UP PARAM F/U: HCPCS | Performed by: INTERNAL MEDICINE

## 2018-10-30 PROCEDURE — G8598 ASA/ANTIPLAT THER USED: HCPCS | Performed by: INTERNAL MEDICINE

## 2018-10-30 RX ORDER — METOPROLOL TARTRATE 50 MG/1
50 TABLET, FILM COATED ORAL 2 TIMES DAILY
Qty: 180 TABLET | Refills: 1 | Status: SHIPPED | OUTPATIENT
Start: 2018-10-30 | End: 2019-02-19 | Stop reason: SDUPTHER

## 2018-10-31 LAB
ALBUMIN SERPL-MCNC: 4 G/DL (ref 3.4–5)
ANION GAP SERPL CALCULATED.3IONS-SCNC: 15 MMOL/L (ref 3–16)
BUN BLDV-MCNC: 39 MG/DL (ref 7–20)
CALCIUM SERPL-MCNC: 9.3 MG/DL (ref 8.3–10.6)
CHLORIDE BLD-SCNC: 100 MMOL/L (ref 99–110)
CO2: 27 MMOL/L (ref 21–32)
CREAT SERPL-MCNC: 1.6 MG/DL (ref 0.6–1.2)
GFR AFRICAN AMERICAN: 38
GFR NON-AFRICAN AMERICAN: 31
GLUCOSE BLD-MCNC: 86 MG/DL (ref 70–99)
PHOSPHORUS: 4.3 MG/DL (ref 2.5–4.9)
POTASSIUM SERPL-SCNC: 4.6 MMOL/L (ref 3.5–5.1)
PRO-BNP: 656 PG/ML (ref 0–449)
SODIUM BLD-SCNC: 142 MMOL/L (ref 136–145)

## 2018-11-14 ENCOUNTER — OFFICE VISIT (OUTPATIENT)
Dept: PULMONOLOGY | Age: 78
End: 2018-11-14
Payer: MEDICARE

## 2018-11-14 ENCOUNTER — CARE COORDINATION (OUTPATIENT)
Dept: CARE COORDINATION | Age: 78
End: 2018-11-14

## 2018-11-14 VITALS
BODY MASS INDEX: 38.89 KG/M2 | TEMPERATURE: 97.4 F | HEART RATE: 60 BPM | RESPIRATION RATE: 16 BRPM | SYSTOLIC BLOOD PRESSURE: 146 MMHG | HEIGHT: 66 IN | WEIGHT: 242 LBS | DIASTOLIC BLOOD PRESSURE: 66 MMHG | OXYGEN SATURATION: 94 %

## 2018-11-14 DIAGNOSIS — G47.33 OSA TREATED WITH BIPAP: Primary | Chronic | ICD-10-CM

## 2018-11-14 DIAGNOSIS — Z71.89 ENCOUNTER FOR BIPAP USE COUNSELING: ICD-10-CM

## 2018-11-14 DIAGNOSIS — E66.9 OBESITY (BMI 30-39.9): ICD-10-CM

## 2018-11-14 PROCEDURE — G8400 PT W/DXA NO RESULTS DOC: HCPCS | Performed by: NURSE PRACTITIONER

## 2018-11-14 PROCEDURE — 1090F PRES/ABSN URINE INCON ASSESS: CPT | Performed by: NURSE PRACTITIONER

## 2018-11-14 PROCEDURE — 4040F PNEUMOC VAC/ADMIN/RCVD: CPT | Performed by: NURSE PRACTITIONER

## 2018-11-14 PROCEDURE — 1123F ACP DISCUSS/DSCN MKR DOCD: CPT | Performed by: NURSE PRACTITIONER

## 2018-11-14 PROCEDURE — 99213 OFFICE O/P EST LOW 20 MIN: CPT | Performed by: NURSE PRACTITIONER

## 2018-11-14 PROCEDURE — G8417 CALC BMI ABV UP PARAM F/U: HCPCS | Performed by: NURSE PRACTITIONER

## 2018-11-14 PROCEDURE — 1036F TOBACCO NON-USER: CPT | Performed by: NURSE PRACTITIONER

## 2018-11-14 PROCEDURE — 1101F PT FALLS ASSESS-DOCD LE1/YR: CPT | Performed by: NURSE PRACTITIONER

## 2018-11-14 PROCEDURE — G8427 DOCREV CUR MEDS BY ELIG CLIN: HCPCS | Performed by: NURSE PRACTITIONER

## 2018-11-14 PROCEDURE — G8598 ASA/ANTIPLAT THER USED: HCPCS | Performed by: NURSE PRACTITIONER

## 2018-11-14 PROCEDURE — G8482 FLU IMMUNIZE ORDER/ADMIN: HCPCS | Performed by: NURSE PRACTITIONER

## 2018-11-14 ASSESSMENT — SLEEP AND FATIGUE QUESTIONNAIRES
HOW LIKELY ARE YOU TO NOD OFF OR FALL ASLEEP WHEN YOU ARE A PASSENGER IN A CAR FOR AN HOUR WITHOUT A BREAK: 0
ESS TOTAL SCORE: 0
HOW LIKELY ARE YOU TO NOD OFF OR FALL ASLEEP WHILE SITTING AND TALKING TO SOMEONE: 0
HOW LIKELY ARE YOU TO NOD OFF OR FALL ASLEEP IN A CAR, WHILE STOPPED FOR A FEW MINUTES IN TRAFFIC: 0
NECK CIRCUMFERENCE (INCHES): 17
HOW LIKELY ARE YOU TO NOD OFF OR FALL ASLEEP WHILE SITTING AND READING: 0
HOW LIKELY ARE YOU TO NOD OFF OR FALL ASLEEP WHILE WATCHING TV: 0
HOW LIKELY ARE YOU TO NOD OFF OR FALL ASLEEP WHILE SITTING INACTIVE IN A PUBLIC PLACE: 0
HOW LIKELY ARE YOU TO NOD OFF OR FALL ASLEEP WHILE SITTING QUIETLY AFTER LUNCH WITHOUT ALCOHOL: 0
HOW LIKELY ARE YOU TO NOD OFF OR FALL ASLEEP WHILE LYING DOWN TO REST IN THE AFTERNOON WHEN CIRCUMSTANCES PERMIT: 0

## 2018-11-14 NOTE — PATIENT INSTRUCTIONS
Resmed F30 full face mask  Respironics dreamwear full face mask  RemZZZs CPAP mask liners    Please keep all of your future appointments scheduled by [address], [facility] Pulmonary and Sleep. Remember to bring your sleep machine, cord and mask to each appointment    You should have a follow up appointment scheduled with a sleep medicine provider within 12 months. Routine parts, masks, tubing and filters should all be obtainable from your DME (equipment) provider. Any problems related to mask fit, comfort or functioning of equipment should also be addressed directly with your DME provider. If you are unable to resolve problems, then please notify our office and schedule an appointment to be seen sooner than the usual follow up appointment. For all patients who are evaluated for sleep disorders, never drive or operate motorized equipment while sleepy, fatigued or groggy. Please bring in all of your sleep equipment to all of your appointments, including machine, mask, cords and hoses. Here are some tips to to getting better sleep  1- Avoid napping during the day: This will ensure you are tired at bedtime. If you have to take a nap, sleep less than one hour, before 3 pm.   2- Exercise regularly, but not right before bed: but the timing of the workout is important. Exercising in the morning or early afternoon will not interfere with sleep. Exercising within two hours before bedtime can decrease your ability to fall asleep. Regular exercise is recommended to help you deepen the sleep. 3- Avoid heavy, spicy, or sugary foods 4-6 hours before bedtime: These can affect your ability to stay asleep. 4- Have a light snack before bed: Having an empty stomach can interfere with your sleep. Dairy products and turkey contain tryptophan, which acts as a natural sleep inducer.    5- Stay away from caffeine, nicotine and alcohol at least 4-6 hours before bed: Caffeine and nicotine are stimulants that

## 2018-11-14 NOTE — PROGRESS NOTES
chronic    Fatty liver     GERD (gastroesophageal reflux disease)     Herniated lumbar intervertebral disc     Hyperlipidemia     Hypertension     Hypothyroid 1/27/2015    Morbid obesity with BMI of 40.0-44.9, adult ([facility] [address] 2/10/2017    BETSY treated with BiPAP     Restless leg syndrome     Restless legs syndrome     Sleep apnea     Tremor, essential 8/16/2010    Type II or unspecified type diabetes mellitus without mention of complication, not stated as uncontrolled        Past Surgical History:        Procedure Laterality Date    APPENDECTOMY      CAROTID ENDARTERECTOMY      left    CATARACT REMOVAL      CERVICAL FUSION  11/2010    CHOLECYSTECTOMY      CORNEAL TRANSPLANT      EYE SURGERY      HERNIA REPAIR      HYSTERECTOMY      KNEE SURGERY      replacement x 2    SHOULDER SURGERY      TONSILLECTOMY      TUBAL LIGATION      VITRECTOMY         Allergies:  is allergic to pyridium [phenazopyridine hcl]; reglan [metoclopramide hcl]; and reglan [metoclopramide hcl]. Social History:    TOBACCO:   reports that she has never smoked. She has never used smokeless tobacco.  ETOH:   reports that she does not drink alcohol.     Family History:   Family History   Problem Relation Age of Onset    Diabetes Mother     Heart Disease Mother     Diabetes Father     Heart Disease Father     Diabetes Sister        Current Medications:    Current Outpatient Prescriptions:     metoprolol tartrate (LOPRESSOR) 50 MG tablet, Take 1 tablet by mouth 2 times daily, Disp: 180 tablet, Rfl: 1    ipratropium-albuterol (DUONEB) 0.5-2.5 (3) MG/3ML SOLN nebulizer solution, NEBULIZE 1 VIAL 4 TIMES DAILY, Disp: 360 mL, Rfl: 3    apixaban (ELIQUIS) 2.5 MG TABS tablet, Take 1 tablet by mouth 2 times daily, Disp: 60 tablet, Rfl: 0    furosemide (LASIX) 40 MG tablet, Take 1 tablet by mouth daily, Disp: 60 tablet, Rfl: 3    propafenone (RYTHMOL) 150 MG tablet, Take 1 tablet by mouth every 8 hours, Disp: 90 tablet, Rfl: 3  

## 2018-11-15 ASSESSMENT — ENCOUNTER SYMPTOMS: DYSPNEA ASSOCIATED WITH: EXERTION

## 2018-11-15 NOTE — CARE COORDINATION
ACC: Kylie Brody RN  CM Risk Score: 13  Theron Mortality Risk Score: 32     Summary/Assessment:   OT and PT - home care is almost completed  [place] [place] [address] [place]  COPD at baseline  Feeling much better   Pt is using walker and is able to walk in community and able to walk down driveway with PT     Patient Reported Weight   Patient Reported Weights 11/15/2018 7/30/2018   Weight 243 lb 9.6 oz 252 lb       Intervention: Discussed inhaler use and pt/ decline visit with [facility] to review but do accept video links  Pt  states did not receive OMKAR videos - Will resend  Reviewed sx to call - zone mgmt  Inhaler med review with   Refill request for Eliquis by PCP PA      Plan:  Follow up next month      Future Appointments  Date Time Provider Katy Booker   11/20/2018 2:45 PM MAHAD Mckeon CNP [facility]   12/11/2018 8:30 AM MD BIGG Mera [facility]   2/4/2019 10:15 AM MD BIGG Mera [facility]   11/13/2019 10:30 AM MAHAD Coughlin CNP Health system     Congestive Heart Failure Assessment           Symptoms:   CHF associated dyspnea on exertion: Pos      Symptom course:  stable  Patient-reported weight (lb):  243.9  Weight trend:  stable  Salt intake watch compared to last visit:  stable      and   COPD Assessment    Does the patient understand envrionmental exposure?:  Yes  Is the patient able to verbalize Rescue vs. Long Acting medications?:  Yes  Does the patient have a nebulizer?:  Yes  Does the patient use a space with inhaled medications?:  Yes            Symptoms:      Breathlessness:  exertion  Change in chronic cough?:  No/At Baseline  Change in sputum?:  No/At Baseline           Care Coordination Interventions    Program Enrollment:  Complex Care  Referral from Primary Care Provider:  No  Suggested Interventions and Community Resources  Occupational Therapy:  Completed (Comment: Assist with home function and make recommendations about

## 2018-11-19 RX ORDER — APIXABAN 2.5 MG/1
2.5 TABLET, FILM COATED ORAL 2 TIMES DAILY
OUTPATIENT
Start: 2018-11-19

## 2018-11-20 ENCOUNTER — OFFICE VISIT (OUTPATIENT)
Dept: CARDIOLOGY CLINIC | Age: 78
End: 2018-11-20
Payer: MEDICARE

## 2018-11-20 VITALS
WEIGHT: 248.2 LBS | HEIGHT: 66 IN | HEART RATE: 67 BPM | DIASTOLIC BLOOD PRESSURE: 78 MMHG | SYSTOLIC BLOOD PRESSURE: 134 MMHG | OXYGEN SATURATION: 95 % | BODY MASS INDEX: 39.89 KG/M2

## 2018-11-20 DIAGNOSIS — G47.33 OSA TREATED WITH BIPAP: ICD-10-CM

## 2018-11-20 DIAGNOSIS — I50.32 CHRONIC DIASTOLIC CONGESTIVE HEART FAILURE (HCC): ICD-10-CM

## 2018-11-20 DIAGNOSIS — E78.2 MIXED HYPERLIPIDEMIA: ICD-10-CM

## 2018-11-20 DIAGNOSIS — N18.2 CKD (CHRONIC KIDNEY DISEASE) STAGE 2, GFR 60-89 ML/MIN: ICD-10-CM

## 2018-11-20 DIAGNOSIS — I10 ESSENTIAL HYPERTENSION: ICD-10-CM

## 2018-11-20 DIAGNOSIS — I48.0 PAF (PAROXYSMAL ATRIAL FIBRILLATION) (HCC): Primary | ICD-10-CM

## 2018-11-20 PROCEDURE — G8427 DOCREV CUR MEDS BY ELIG CLIN: HCPCS | Performed by: NURSE PRACTITIONER

## 2018-11-20 PROCEDURE — 1090F PRES/ABSN URINE INCON ASSESS: CPT | Performed by: NURSE PRACTITIONER

## 2018-11-20 PROCEDURE — 1101F PT FALLS ASSESS-DOCD LE1/YR: CPT | Performed by: NURSE PRACTITIONER

## 2018-11-20 PROCEDURE — G8417 CALC BMI ABV UP PARAM F/U: HCPCS | Performed by: NURSE PRACTITIONER

## 2018-11-20 PROCEDURE — 1036F TOBACCO NON-USER: CPT | Performed by: NURSE PRACTITIONER

## 2018-11-20 PROCEDURE — 99214 OFFICE O/P EST MOD 30 MIN: CPT | Performed by: NURSE PRACTITIONER

## 2018-11-20 PROCEDURE — G8598 ASA/ANTIPLAT THER USED: HCPCS | Performed by: NURSE PRACTITIONER

## 2018-11-20 PROCEDURE — G8400 PT W/DXA NO RESULTS DOC: HCPCS | Performed by: NURSE PRACTITIONER

## 2018-11-20 PROCEDURE — G8482 FLU IMMUNIZE ORDER/ADMIN: HCPCS | Performed by: NURSE PRACTITIONER

## 2018-11-20 PROCEDURE — 4040F PNEUMOC VAC/ADMIN/RCVD: CPT | Performed by: NURSE PRACTITIONER

## 2018-11-20 PROCEDURE — 1123F ACP DISCUSS/DSCN MKR DOCD: CPT | Performed by: NURSE PRACTITIONER

## 2018-11-20 ASSESSMENT — ENCOUNTER SYMPTOMS
CHEST TIGHTNESS: 0
WHEEZING: 0
GASTROINTESTINAL NEGATIVE: 1
SHORTNESS OF BREATH: 1

## 2018-11-20 NOTE — PROGRESS NOTES
daily. 7/30/18  Yes Zhane Mejia MD   celecoxib (CELEBREX) 200 MG capsule Take 1 capsule by mouth daily. 7/30/18  Yes Zhane Mejia MD   DULoxetine (CYMBALTA) 60 MG extended release capsule Take 1 capsule by mouth daily 7/30/18  Yes Zhane Mejia MD   albuterol sulfate HFA (VENTOLIN HFA) 108 (90 Base) MCG/ACT inhaler Inhale 2 puffs into the lungs every 6 hours as needed for Wheezing 7/30/18  Yes Zhane Mejia MD   fluticasone (FLONASE) 50 MCG/ACT nasal spray USE 2 PUFFS IN EACH NOSTRIL DAILY 7/24/18  Yes Zhane Mejia MD   salmeterol (SEREVENT) 50 MCG/DOSE diskus inhaler Inhale 1 puff by mouth 2 times a day. 5/11/18  Yes Zhane Mejia MD   budesonide (PULMICORT FLEXHALER) 180 MCG/ACT AEPB inhaler Inhale 2 puffs into the lungs 2 times daily 5/11/18  Yes Zhane Mejia MD   Misc. Devices (FOAM CHAIR CUSHION) MISC Seat cushion skin protection width 22 inches or greater  Diagnosis: Stage 2 sacral decubitus ulcer, L89.152  Duration: 99 4/13/18  Yes Zhane Mejia MD   Respiratory Therapy Supplies (NEBULIZER COMPRESSOR) KIT Use Q6 hours as needed with Duoneb. 3/5/18  Yes BIJAN Gomez   lamoTRIgine (LAMICTAL) 25 MG tablet Take 50 mg by mouth 2 times daily    Yes Historical Provider, MD   Multiple Vitamins-Minerals (THERAPEUTIC MULTIVITAMIN-MINERALS) tablet Take 1 tablet by mouth daily   Yes Historical Provider, MD   Alpha-Lipoic Acid 300 MG CAPS Take 300 mg by mouth daily   Yes Historical Provider, MD   Lift Chair MISC Use daily due to leg weakness. 12/14/15  Yes Zhane Mejia MD   Handicap Placard MISC by Does not apply route. 12/12/14-12/12/19 12/12/14  Yes Zhane Mejia MD   aspirin EC 81 MG EC tablet Take 1 tablet by mouth daily. Start taking next week. Indications: OTC 2/25/13  Yes Magali Patrick MD   gabapentin (NEURONTIN) 100 MG capsule Take 1 capsule by mouth daily. . 7/30/18 10/28/18  Zhane Mejia MD   solifenacin (VESICARE) 5 MG tablet Take 1 tablet by mouth daily.  6/25/13

## 2018-11-20 NOTE — LETTER
[address] Cardiology - [facility] [place] [address] [address] [address]  Phone: [phone]  Fax: 305 [facility], APRN - [facility]        November 21, 2018     Armida Maloney [address] [place] [address]    Patient: Satinder Smith  MR Number: [id number]  YOB: 1940  Date of Visit: 11/20/2018    Dear Dr. Kvng Rasmussen:     Below are the relevant portions of my assessment and plan of care. [facility]   Cardiac Evaluation    Primary Care Doctor:  Kvng Rasmussen MD    Chief Complaint   Patient presents with    1 Month Follow-Up    Edema     both legs        History of Present Illness:   I had the pleasure of seeing Satinder Smith in follow up for PAf, dCHF, HTN, HLD, DM, BETSY on CPAP, PAD s/p R -CEA, CKD and Parkinsons. She had been diuresed during last hospitalization down to 243 lbs (home weight on 10/23/18). Her weight since then has been staying 243 to 244 lbs. She follows a low salt diet pretty strictly. Her BP at home continues to run 140-170/ 60-70's. Heart rate in the 50-60 range. Her breathing is overall stable but has dyspnea on exertion with walking down driveway (~167 ft). She does okay around the house. She reports fatigue and leg weakness with activity such as ADL's. The home therapists finish this week. Of note, the metoprolol was decreased and losartan stopped during last hospitalization due to hypotension with FARHEEN. PCP recently increased metoprolol to 50 mg bid. Satinder Smith describes symptoms including dyspnea, fatigue, edema but denies chest pain, palpitations, orthopnea, PND, early saiety, syncope. NYHA:   IIb  ACC/ AHA Stage:    C    Past Medical History:   has a past medical history of Anemia, unspecified; Asthma; Baker's cyst; Blind left eye; Carotid artery stenoses; Carotid stenosis; Cervical spinal cord compression ([address]  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-sided sciatica; CKD (chronic kidney disease) stage 3, GFR 30-59 ml/min ([facility] [address] Community acquired pneumonia of left lower lobe of lung ([facility] [address] CRI; Detached retina, left; Diverticulosis; DJD (degenerative joint disease); Edema; Fatty liver; GERD (gastroesophageal reflux disease); Herniated lumbar intervertebral disc; Hyperlipidemia; Hypertension; Hypothyroid; Morbid obesity with BMI of 40.0-44.9, adult ([facility] [address] BETSY treated with BiPAP; Restless leg syndrome; Restless legs syndrome; Sleep apnea; Tremor, essential; and Type II or unspecified type diabetes mellitus without mention of complication, not stated as uncontrolled. Surgical History:   has a past surgical history that includes Tonsillectomy; Hysterectomy; Cholecystectomy; Tubal ligation; knee surgery; hernia repair; eye surgery; Cataract removal; vitrectomy; Corneal transplant; Carotid endarterectomy; Appendectomy; cervical fusion (11/2010); and shoulder surgery. Social History:   reports that she has never smoked. She has never used smokeless tobacco. She reports that she does not drink alcohol or use drugs. Family History:   Family History   Problem Relation Age of Onset    Diabetes Mother     Heart Disease Mother     Diabetes Father     Heart Disease Father     Diabetes Sister        Home Medications:  Prior to Admission medications    Medication Sig Start Date End Date Taking?  Authorizing Provider   apixaban (ELIQUIS) 2.5 MG TABS tablet Take 1 tablet by mouth 2 times daily 11/15/18  Yes BIJAN Hall   metoprolol tartrate (LOPRESSOR) 50 MG tablet Take 1 tablet by mouth 2 times daily 10/30/18  Yes Naresh Ho MD   ipratropium-albuterol (DUONEB) 0.5-2.5 (3) MG/3ML SOLN nebulizer solution NEBULIZE 1 VIAL 4 TIMES DAILY 10/12/18  Yes Naresh Ho MD   furosemide (LASIX) 40 MG tablet Take 1 tablet by mouth daily 10/4/18  Yes MAHAD Gay - CNP   propafenone (RYTHMOL) 150 MG tablet Take 1 tablet by mouth every 8 hours

## 2018-11-21 DIAGNOSIS — G95.20 CERVICAL SPINAL CORD COMPRESSION (HCC): Primary | Chronic | ICD-10-CM

## 2018-11-21 RX ORDER — GABAPENTIN 100 MG/1
100 CAPSULE ORAL EVERY EVENING
Qty: 90 CAPSULE | Refills: 0 | Status: SHIPPED | OUTPATIENT
Start: 2018-11-21 | End: 2019-02-19 | Stop reason: SDUPTHER

## 2018-11-21 NOTE — COMMUNICATION BODY
[facility]   Cardiac Evaluation    Primary Care Doctor:  Alin Reese MD    Chief Complaint   Patient presents with    1 Month Follow-Up    Edema     both legs        History of Present Illness:   I had the pleasure of seeing Valbrianne Fears in follow up for PAf, dCHF, HTN, HLD, DM, BETSY on CPAP, PAD s/p R -CEA, CKD and Parkinsons. She had been diuresed during last hospitalization down to 243 lbs (home weight on 10/23/18). Her weight since then has been staying 243 to 244 lbs. She follows a low salt diet pretty strictly. Her BP at home continues to run 140-170/ 60-70's. Heart rate in the 50-60 range. Her breathing is overall stable but has dyspnea on exertion with walking down driveway (~906 ft). She does okay around the house. She reports fatigue and leg weakness with activity such as ADL's. The home therapists finish this week. Of note, the metoprolol was decreased and losartan stopped during last hospitalization due to hypotension with FARHEEN. PCP recently increased metoprolol to 50 mg bid. Valiant Fears describes symptoms including dyspnea, fatigue, edema but denies chest pain, palpitations, orthopnea, PND, early saiety, syncope. NYHA:   IIb  ACC/ AHA Stage:    C    Past Medical History:   has a past medical history of Anemia, unspecified; Asthma; Baker's cyst; Blind left eye; Carotid artery stenoses; Carotid stenosis; Cervical spinal cord compression ([address] 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-sided sciatica; CKD (chronic kidney disease) stage 3, GFR 30-59 ml/min ([facility]); Community acquired pneumonia of left lower lobe of lung ([address] CRI; Detached retina, left; Diverticulosis; DJD (degenerative joint disease); Edema; Fatty liver; GERD (gastroesophageal reflux disease); Herniated lumbar intervertebral disc; Hyperlipidemia; Hypertension; Hypothyroid; Morbid obesity with BMI of 40.0-44.9, adult ([address] BETSY treated with BiPAP; Restless leg syndrome;  Restless legs syndrome; Sleep 4.08 09/26/2018    RBC 3.41 09/09/2018    HGB 9.1 09/27/2018    HGB 10.8 09/26/2018    HGB 9.0 09/09/2018    HCT 28.0 09/27/2018    HCT 34.2 09/26/2018    HCT 28.1 09/09/2018    MCV 82.2 09/27/2018    MCV 84.0 09/26/2018    MCV 82.3 09/09/2018    RDW 15.6 09/27/2018    RDW 15.9 09/26/2018    RDW 15.6 09/09/2018     09/27/2018     09/26/2018     09/09/2018     BMP:  Lab Results   Component Value Date     10/30/2018     10/08/2018     10/03/2018    K 4.6 10/30/2018    K 4.9 10/08/2018    K 5.0 10/03/2018     10/30/2018     10/08/2018    CL 93 10/03/2018    CO2 27 10/30/2018    CO2 32 10/08/2018    CO2 38 10/03/2018    PHOS 4.3 10/30/2018    PHOS 3.9 09/09/2018    PHOS 3.5 09/08/2018    BUN 39 10/30/2018    BUN 61 10/08/2018    BUN 68 10/03/2018    CREATININE 1.6 10/30/2018    CREATININE 2.0 10/08/2018    CREATININE 1.8 10/03/2018     BNP:   Lab Results   Component Value Date    PROBNP 656 10/30/2018    PROBNP 391 10/01/2018    PROBNP 1,472 09/29/2018        Cardiac Imaging:  Echo doppler 9.27.18   Summary   Normal left ventricular systolic function with ejection fraction of 55-60%. No regional wall motion abnormalites are seen. Mild concentric left ventricular hypertrophy. Grade I diastolic dysfunction with normal filling pressure. Mild to moderate mitral regurgitation. The left atrium is mildly dilated. Moderate tricuspid regurgitation. Systolic pulmonic artery pressure (SPAP) is estimated at 51 mmHg consistent with mild pulmonary hypertension (Right atrial pressure of 3 mmHg). Compared to previous study from 3- no changes noted. Echo 3/17/2017:  No change from echo done 3/4/2014. Left ventricular systolic function is normal with the ejection fraction estimated at 55%. No regional wall motion abnormalities. Grade II diastolic dysfunction with elevated filling pressure.    Mild concentric left ventricular hypertrophy with a sigmoid septum. No outflow obstruction. Left ventricle size is normal.   Mild mitral annular calcification. Mitral valve leaflets are structurally normal.   Calcification in the chordae of the mitral valve. Mild mitral valve regurgitation. Aortic valve leaflets are mildly thickened with normal opening. No aortic valve regurgitation. Echo 3/4/2014:  Overall left ventricular function is normal. Ejection fraction is visually estimated to be 55-60 %. Moderate concentric left ventricular hypertrophy is present. Slight calcification of the mitral valve noted. Mild mitral regurgitation is present. Dilated left atrium with increased left atrial volume. Assessment:    1. PAF (paroxysmal atrial fibrillation) ([address]    2. Chronic diastolic congestive heart failure ([address]    3. Essential hypertension    4. Mixed hyperlipidemia    5. 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    6. BETSY treated with BiPAP      She is now hypertensive. Most recent renal panel stable. Unable to titrate BB further due to bradycardia. Favor avoiding additional diuretic given her renal function, avoid lightheadedness given her Parkinson's and unsteady gait. She has had edema with amlodipine in past.  Favor adding back lower dose of ARB and monitor BP and renal panel. Will defer to Dr. Lisa Seay who she see's next week. Plan:   1. Continue current heart medicines  2. Follow up with Dr. Lisa Seay next week - suggest starting lower dose of the losartan (25 mg)  3. Follow up with me in 2 months      I appreciate the opportunity of cooperating in the care of this individual.    Ariella Benavides, MAHAD - CNP, 11/20/2018, 3:08 PM       QUALITY MEASURES  1. Tobacco Cessation Counseling: NA  2. Retake of BP if >140/90:   NA  3. Documentation to PCP/referring for new patient:  Sent to PCP at close of office visit  4. CAD patient on anti-platelet: Yes  5. CAD patient on STATIN therapy:  Yes  6.  Patient with CHF and aFib on anticoagulation:  Yes

## 2018-12-10 ENCOUNTER — APPOINTMENT (OUTPATIENT)
Dept: CT IMAGING | Age: 78
DRG: 189 | End: 2018-12-10
Payer: MEDICARE

## 2018-12-10 ENCOUNTER — HOSPITAL ENCOUNTER (INPATIENT)
Age: 78
LOS: 4 days | Discharge: HOME HEALTH | DRG: 189 | End: 2018-12-14
Attending: EMERGENCY MEDICINE | Admitting: INTERNAL MEDICINE
Payer: MEDICARE

## 2018-12-10 ENCOUNTER — APPOINTMENT (OUTPATIENT)
Dept: GENERAL RADIOLOGY | Age: 78
DRG: 189 | End: 2018-12-10
Payer: MEDICARE

## 2018-12-10 ENCOUNTER — HOSPITAL ENCOUNTER (OUTPATIENT)
Age: 78
Discharge: HOME | End: 2018-12-10
Payer: MEDICARE

## 2018-12-10 DIAGNOSIS — R41.82 ALTERED MENTAL STATUS, UNSPECIFIED ALTERED MENTAL STATUS TYPE: ICD-10-CM

## 2018-12-10 DIAGNOSIS — J40 BRONCHITIS: ICD-10-CM

## 2018-12-10 DIAGNOSIS — D72.829 LEUKOCYTOSIS, UNSPECIFIED TYPE: ICD-10-CM

## 2018-12-10 DIAGNOSIS — J44.1 COPD EXACERBATION (HCC): Primary | ICD-10-CM

## 2018-12-10 DIAGNOSIS — R09.02 HYPOXIA: ICD-10-CM

## 2018-12-10 PROBLEM — J44.9 COPD (CHRONIC OBSTRUCTIVE PULMONARY DISEASE) (HCC): Status: ACTIVE | Noted: 2018-12-10

## 2018-12-10 LAB
A/G RATIO: 1.2 (ref 1.1–2.2)
ALBUMIN SERPL-MCNC: 3.9 G/DL (ref 3.4–5)
ALBUMIN SERPL-MCNC: 3.9 G/DL (ref 3.4–5)
ALP BLD-CCNC: 121 U/L (ref 40–129)
ALT SERPL-CCNC: <5 U/L (ref 10–40)
ANION GAP SERPL CALCULATED.3IONS-SCNC: 12 MMOL/L (ref 3–16)
ANION GAP SERPL CALCULATED.3IONS-SCNC: 17 MMOL/L (ref 3–16)
APTT: 34 SEC (ref 26–36)
AST SERPL-CCNC: 16 U/L (ref 15–37)
BASE EXCESS VENOUS: 7.3 MMOL/L (ref -3–3)
BASOPHILS ABSOLUTE: 0.1 K/UL (ref 0–0.2)
BASOPHILS RELATIVE PERCENT: 0.6 %
BILIRUB SERPL-MCNC: 0.5 MG/DL (ref 0–1)
BILIRUBIN URINE: NEGATIVE
BLOOD, URINE: NEGATIVE
BUN BLDV-MCNC: 28 MG/DL (ref 7–20)
BUN BLDV-MCNC: 32 MG/DL (ref 7–20)
CALCIUM SERPL-MCNC: 9.3 MG/DL (ref 8.3–10.6)
CALCIUM SERPL-MCNC: 9.4 MG/DL (ref 8.3–10.6)
CARBOXYHEMOGLOBIN: 2.5 % (ref 0–1.5)
CHLORIDE BLD-SCNC: 94 MMOL/L (ref 99–110)
CHLORIDE BLD-SCNC: 97 MMOL/L (ref 99–110)
CLARITY: CLEAR
CO2: 26 MMOL/L (ref 21–32)
CO2: 31 MMOL/L (ref 21–32)
COLOR: YELLOW
CREAT SERPL-MCNC: 1.6 MG/DL (ref 0.6–1.2)
CREAT SERPL-MCNC: 1.8 MG/DL (ref 0.6–1.2)
EOSINOPHILS ABSOLUTE: 0.1 K/UL (ref 0–0.6)
EOSINOPHILS RELATIVE PERCENT: 0.4 %
GFR AFRICAN AMERICAN: 33
GFR AFRICAN AMERICAN: 38
GFR NON-AFRICAN AMERICAN: 27
GFR NON-AFRICAN AMERICAN: 31
GLOBULIN: 3.3 G/DL
GLUCOSE BLD-MCNC: 114 MG/DL (ref 70–99)
GLUCOSE BLD-MCNC: 125 MG/DL (ref 70–99)
GLUCOSE BLD-MCNC: 222 MG/DL (ref 70–99)
GLUCOSE URINE: NEGATIVE MG/DL
HCO3 VENOUS: 34.7 MMOL/L (ref 23–29)
HCT VFR BLD CALC: 36.2 % (ref 36–48)
HEMOGLOBIN: 11.5 G/DL (ref 12–16)
INR BLD: 1.27 (ref 0.86–1.14)
KETONES, URINE: NEGATIVE MG/DL
LACTIC ACID: 1 MMOL/L (ref 0.4–2)
LEUKOCYTE ESTERASE, URINE: NEGATIVE
LYMPHOCYTES ABSOLUTE: 1.3 K/UL (ref 1–5.1)
LYMPHOCYTES RELATIVE PERCENT: 6.4 %
MCH RBC QN AUTO: 26.8 PG (ref 26–34)
MCHC RBC AUTO-ENTMCNC: 31.7 G/DL (ref 31–36)
MCV RBC AUTO: 84.5 FL (ref 80–100)
METHEMOGLOBIN VENOUS: 0.1 %
MICROSCOPIC EXAMINATION: NORMAL
MONOCYTES ABSOLUTE: 1.3 K/UL (ref 0–1.3)
MONOCYTES RELATIVE PERCENT: 6.1 %
NEUTROPHILS ABSOLUTE: 18.1 K/UL (ref 1.7–7.7)
NEUTROPHILS RELATIVE PERCENT: 86.5 %
NITRITE, URINE: NEGATIVE
O2 CONTENT, VEN: 5 VOL %
O2 SAT, VEN: 32 %
O2 THERAPY: ABNORMAL
PCO2, VEN: 63.3 MMHG (ref 40–50)
PDW BLD-RTO: 14.8 % (ref 12.4–15.4)
PERFORMED ON: ABNORMAL
PH UA: 5.5
PH VENOUS: 7.36 (ref 7.35–7.45)
PHOSPHORUS: 3.9 MG/DL (ref 2.5–4.9)
PLATELET # BLD: 223 K/UL (ref 135–450)
PMV BLD AUTO: 9.2 FL (ref 5–10.5)
PO2, VEN: 21.1 MMHG (ref 25–40)
POTASSIUM REFLEX MAGNESIUM: 4.8 MMOL/L (ref 3.5–5.1)
POTASSIUM SERPL-SCNC: 4.5 MMOL/L (ref 3.5–5.1)
PRO-BNP: 1177 PG/ML (ref 0–449)
PROTEIN UA: NEGATIVE MG/DL
PROTHROMBIN TIME: 14.4 SEC (ref 9.8–13)
RBC # BLD: 4.29 M/UL (ref 4–5.2)
SODIUM BLD-SCNC: 137 MMOL/L (ref 136–145)
SODIUM BLD-SCNC: 140 MMOL/L (ref 136–145)
SPECIFIC GRAVITY UA: 1.01
TCO2 CALC VENOUS: 37 MMOL/L
TOTAL PROTEIN: 7.2 G/DL (ref 6.4–8.2)
TROPONIN: <0.01 NG/ML
URINE REFLEX TO CULTURE: NORMAL
URINE TYPE: NORMAL
UROBILINOGEN, URINE: 0.2 E.U./DL
WBC # BLD: 21 K/UL (ref 4–11)

## 2018-12-10 PROCEDURE — 85025 COMPLETE CBC W/AUTO DIFF WBC: CPT

## 2018-12-10 PROCEDURE — 6370000000 HC RX 637 (ALT 250 FOR IP)

## 2018-12-10 PROCEDURE — 96365 THER/PROPH/DIAG IV INF INIT: CPT

## 2018-12-10 PROCEDURE — 70450 CT HEAD/BRAIN W/O DYE: CPT

## 2018-12-10 PROCEDURE — 87801 DETECT AGNT MULT DNA AMPLI: CPT

## 2018-12-10 PROCEDURE — 6370000000 HC RX 637 (ALT 250 FOR IP): Performed by: INTERNAL MEDICINE

## 2018-12-10 PROCEDURE — 2060000000 HC ICU INTERMEDIATE R&B

## 2018-12-10 PROCEDURE — 99285 EMERGENCY DEPT VISIT HI MDM: CPT

## 2018-12-10 PROCEDURE — 36415 COLL VENOUS BLD VENIPUNCTURE: CPT

## 2018-12-10 PROCEDURE — 85730 THROMBOPLASTIN TIME PARTIAL: CPT

## 2018-12-10 PROCEDURE — 2580000003 HC RX 258: Performed by: EMERGENCY MEDICINE

## 2018-12-10 PROCEDURE — 80053 COMPREHEN METABOLIC PANEL: CPT

## 2018-12-10 PROCEDURE — 71045 X-RAY EXAM CHEST 1 VIEW: CPT

## 2018-12-10 PROCEDURE — 2580000003 HC RX 258: Performed by: INTERNAL MEDICINE

## 2018-12-10 PROCEDURE — 6360000002 HC RX W HCPCS: Performed by: EMERGENCY MEDICINE

## 2018-12-10 PROCEDURE — 84484 ASSAY OF TROPONIN QUANT: CPT

## 2018-12-10 PROCEDURE — 93005 ELECTROCARDIOGRAM TRACING: CPT | Performed by: EMERGENCY MEDICINE

## 2018-12-10 PROCEDURE — 82803 BLOOD GASES ANY COMBINATION: CPT

## 2018-12-10 PROCEDURE — 2500000003 HC RX 250 WO HCPCS: Performed by: EMERGENCY MEDICINE

## 2018-12-10 PROCEDURE — 83880 ASSAY OF NATRIURETIC PEPTIDE: CPT

## 2018-12-10 PROCEDURE — 87040 BLOOD CULTURE FOR BACTERIA: CPT

## 2018-12-10 PROCEDURE — S0028 INJECTION, FAMOTIDINE, 20 MG: HCPCS | Performed by: EMERGENCY MEDICINE

## 2018-12-10 PROCEDURE — 6370000000 HC RX 637 (ALT 250 FOR IP): Performed by: EMERGENCY MEDICINE

## 2018-12-10 PROCEDURE — 85610 PROTHROMBIN TIME: CPT

## 2018-12-10 PROCEDURE — 96375 TX/PRO/DX INJ NEW DRUG ADDON: CPT

## 2018-12-10 PROCEDURE — 83036 HEMOGLOBIN GLYCOSYLATED A1C: CPT

## 2018-12-10 PROCEDURE — 81003 URINALYSIS AUTO W/O SCOPE: CPT

## 2018-12-10 PROCEDURE — 80069 RENAL FUNCTION PANEL: CPT

## 2018-12-10 PROCEDURE — 83605 ASSAY OF LACTIC ACID: CPT

## 2018-12-10 RX ORDER — DOXYCYCLINE HYCLATE 100 MG
100 TABLET ORAL EVERY 12 HOURS SCHEDULED
Status: DISCONTINUED | OUTPATIENT
Start: 2018-12-10 | End: 2018-12-14 | Stop reason: HOSPADM

## 2018-12-10 RX ORDER — IPRATROPIUM BROMIDE AND ALBUTEROL SULFATE 2.5; .5 MG/3ML; MG/3ML
1 SOLUTION RESPIRATORY (INHALATION) ONCE
Status: COMPLETED | OUTPATIENT
Start: 2018-12-10 | End: 2018-12-10

## 2018-12-10 RX ORDER — GABAPENTIN 100 MG/1
100 CAPSULE ORAL EVERY EVENING
Status: DISCONTINUED | OUTPATIENT
Start: 2018-12-10 | End: 2018-12-14 | Stop reason: HOSPADM

## 2018-12-10 RX ORDER — LOSARTAN POTASSIUM 25 MG/1
25 TABLET ORAL DAILY
COMMUNITY
End: 2019-01-09 | Stop reason: SDUPTHER

## 2018-12-10 RX ORDER — METHYLPREDNISOLONE SODIUM SUCCINATE 125 MG/2ML
125 INJECTION, POWDER, LYOPHILIZED, FOR SOLUTION INTRAMUSCULAR; INTRAVENOUS ONCE
Status: COMPLETED | OUTPATIENT
Start: 2018-12-10 | End: 2018-12-10

## 2018-12-10 RX ORDER — DEXTROSE MONOHYDRATE 50 MG/ML
100 INJECTION, SOLUTION INTRAVENOUS PRN
Status: DISCONTINUED | OUTPATIENT
Start: 2018-12-10 | End: 2018-12-14 | Stop reason: HOSPADM

## 2018-12-10 RX ORDER — ASPIRIN 81 MG/1
81 TABLET ORAL DAILY
Status: DISCONTINUED | OUTPATIENT
Start: 2018-12-11 | End: 2018-12-14 | Stop reason: HOSPADM

## 2018-12-10 RX ORDER — DIPHENHYDRAMINE HCL 25 MG
TABLET ORAL
Status: COMPLETED
Start: 2018-12-10 | End: 2018-12-10

## 2018-12-10 RX ORDER — LAMOTRIGINE 25 MG/1
50 TABLET ORAL 2 TIMES DAILY
Status: DISCONTINUED | OUTPATIENT
Start: 2018-12-11 | End: 2018-12-12 | Stop reason: SDUPTHER

## 2018-12-10 RX ORDER — METHYLPREDNISOLONE SODIUM SUCCINATE 40 MG/ML
40 INJECTION, POWDER, LYOPHILIZED, FOR SOLUTION INTRAMUSCULAR; INTRAVENOUS DAILY
Status: DISCONTINUED | OUTPATIENT
Start: 2018-12-11 | End: 2018-12-12

## 2018-12-10 RX ORDER — SODIUM CHLORIDE 0.9 % (FLUSH) 0.9 %
10 SYRINGE (ML) INJECTION PRN
Status: DISCONTINUED | OUTPATIENT
Start: 2018-12-10 | End: 2018-12-14 | Stop reason: HOSPADM

## 2018-12-10 RX ORDER — MONTELUKAST SODIUM 10 MG/1
10 TABLET ORAL NIGHTLY
Status: DISCONTINUED | OUTPATIENT
Start: 2018-12-10 | End: 2018-12-14 | Stop reason: HOSPADM

## 2018-12-10 RX ORDER — DIPHENHYDRAMINE HCL 25 MG
50 TABLET ORAL ONCE
Status: COMPLETED | OUTPATIENT
Start: 2018-12-10 | End: 2018-12-10

## 2018-12-10 RX ORDER — SODIUM CHLORIDE 0.9 % (FLUSH) 0.9 %
10 SYRINGE (ML) INJECTION EVERY 12 HOURS SCHEDULED
Status: DISCONTINUED | OUTPATIENT
Start: 2018-12-10 | End: 2018-12-14 | Stop reason: HOSPADM

## 2018-12-10 RX ORDER — ATORVASTATIN CALCIUM 40 MG/1
40 TABLET, FILM COATED ORAL NIGHTLY
Status: DISCONTINUED | OUTPATIENT
Start: 2018-12-10 | End: 2018-12-14 | Stop reason: HOSPADM

## 2018-12-10 RX ORDER — DULOXETIN HYDROCHLORIDE 60 MG/1
60 CAPSULE, DELAYED RELEASE ORAL DAILY
Status: DISCONTINUED | OUTPATIENT
Start: 2018-12-11 | End: 2018-12-14 | Stop reason: HOSPADM

## 2018-12-10 RX ORDER — DEXTROSE MONOHYDRATE 25 G/50ML
12.5 INJECTION, SOLUTION INTRAVENOUS PRN
Status: DISCONTINUED | OUTPATIENT
Start: 2018-12-10 | End: 2018-12-14 | Stop reason: HOSPADM

## 2018-12-10 RX ORDER — ONDANSETRON 2 MG/ML
4 INJECTION INTRAMUSCULAR; INTRAVENOUS EVERY 6 HOURS PRN
Status: DISCONTINUED | OUTPATIENT
Start: 2018-12-10 | End: 2018-12-10 | Stop reason: ALTCHOICE

## 2018-12-10 RX ORDER — PROPAFENONE HYDROCHLORIDE 150 MG/1
150 TABLET, FILM COATED ORAL EVERY 8 HOURS SCHEDULED
Status: DISCONTINUED | OUTPATIENT
Start: 2018-12-10 | End: 2018-12-14 | Stop reason: HOSPADM

## 2018-12-10 RX ORDER — IPRATROPIUM BROMIDE AND ALBUTEROL SULFATE 2.5; .5 MG/3ML; MG/3ML
1 SOLUTION RESPIRATORY (INHALATION)
Status: DISCONTINUED | OUTPATIENT
Start: 2018-12-11 | End: 2018-12-11

## 2018-12-10 RX ORDER — LEVOFLOXACIN 5 MG/ML
500 INJECTION, SOLUTION INTRAVENOUS ONCE
Status: COMPLETED | OUTPATIENT
Start: 2018-12-10 | End: 2018-12-10

## 2018-12-10 RX ORDER — 0.9 % SODIUM CHLORIDE 0.9 %
1000 INTRAVENOUS SOLUTION INTRAVENOUS ONCE
Status: COMPLETED | OUTPATIENT
Start: 2018-12-10 | End: 2018-12-10

## 2018-12-10 RX ORDER — NICOTINE POLACRILEX 4 MG
15 LOZENGE BUCCAL PRN
Status: DISCONTINUED | OUTPATIENT
Start: 2018-12-10 | End: 2018-12-14 | Stop reason: HOSPADM

## 2018-12-10 RX ORDER — FUROSEMIDE 40 MG/1
40 TABLET ORAL DAILY
Status: DISCONTINUED | OUTPATIENT
Start: 2018-12-11 | End: 2018-12-14 | Stop reason: HOSPADM

## 2018-12-10 RX ADMIN — DOXYCYCLINE HYCLATE 100 MG: 100 TABLET, COATED ORAL at 23:46

## 2018-12-10 RX ADMIN — DEXTROSE MONOHYDRATE 500 MG: 50 INJECTION, SOLUTION INTRAVENOUS at 19:13

## 2018-12-10 RX ADMIN — IPRATROPIUM BROMIDE AND ALBUTEROL SULFATE 1 AMPULE: .5; 3 SOLUTION RESPIRATORY (INHALATION) at 15:38

## 2018-12-10 RX ADMIN — SODIUM CHLORIDE 1000 ML: 9 INJECTION, SOLUTION INTRAVENOUS at 17:25

## 2018-12-10 RX ADMIN — CEFTRIAXONE SODIUM 1 G: 1 INJECTION, POWDER, FOR SOLUTION INTRAMUSCULAR; INTRAVENOUS at 19:12

## 2018-12-10 RX ADMIN — FAMOTIDINE 20 MG: 10 INJECTION, SOLUTION INTRAVENOUS at 18:00

## 2018-12-10 RX ADMIN — DIPHENHYDRAMINE HCL 50 MG: 25 TABLET ORAL at 17:59

## 2018-12-10 RX ADMIN — PROPAFENONE HYDROCHLORIDE 150 MG: 150 TABLET, FILM COATED ORAL at 23:48

## 2018-12-10 RX ADMIN — CARBIDOPA AND LEVODOPA 1 TABLET: 25; 100 TABLET ORAL at 23:46

## 2018-12-10 RX ADMIN — APIXABAN 2.5 MG: 5 TABLET, FILM COATED ORAL at 23:47

## 2018-12-10 RX ADMIN — Medication 50 MG: at 17:59

## 2018-12-10 RX ADMIN — MONTELUKAST SODIUM 10 MG: 10 TABLET, FILM COATED ORAL at 23:46

## 2018-12-10 RX ADMIN — METHYLPREDNISOLONE SODIUM SUCCINATE 125 MG: 125 INJECTION, POWDER, FOR SOLUTION INTRAMUSCULAR; INTRAVENOUS at 17:28

## 2018-12-10 RX ADMIN — INSULIN LISPRO 1 UNITS: 100 INJECTION, SOLUTION INTRAVENOUS; SUBCUTANEOUS at 23:48

## 2018-12-10 RX ADMIN — ATORVASTATIN CALCIUM 40 MG: 40 TABLET, FILM COATED ORAL at 23:46

## 2018-12-10 RX ADMIN — IPRATROPIUM BROMIDE AND ALBUTEROL SULFATE 1 AMPULE: .5; 2.5 SOLUTION RESPIRATORY (INHALATION) at 17:30

## 2018-12-10 RX ADMIN — LEVOFLOXACIN 500 MG: 5 INJECTION, SOLUTION INTRAVENOUS at 17:25

## 2018-12-10 RX ADMIN — Medication 10 ML: at 23:47

## 2018-12-10 ASSESSMENT — PAIN DESCRIPTION - PAIN TYPE: TYPE: ACUTE PAIN

## 2018-12-10 ASSESSMENT — PAIN DESCRIPTION - FREQUENCY: FREQUENCY: INTERMITTENT

## 2018-12-10 ASSESSMENT — PAIN DESCRIPTION - ORIENTATION: ORIENTATION: MID;UPPER

## 2018-12-10 ASSESSMENT — PAIN SCALES - GENERAL: PAINLEVEL_OUTOF10: 3

## 2018-12-10 ASSESSMENT — PAIN DESCRIPTION - LOCATION: LOCATION: CHEST

## 2018-12-10 ASSESSMENT — PAIN DESCRIPTION - DESCRIPTORS: DESCRIPTORS: DISCOMFORT;BURNING

## 2018-12-10 NOTE — ED PROVIDER NOTES
Basophils # 0.1 0.0 - 0.2 K/uL   Comprehensive Metabolic Panel w/ Reflex to MG   Result Value Ref Range    Sodium 137 136 - 145 mmol/L    Potassium reflex Magnesium 4.8 3.5 - 5.1 mmol/L    Chloride 94 (L) 99 - 110 mmol/L    CO2 31 21 - 32 mmol/L    Anion Gap 12 3 - 16    Glucose 125 (H) 70 - 99 mg/dL    BUN 32 (H) 7 - 20 mg/dL    CREATININE 1.8 (H) 0.6 - 1.2 mg/dL    GFR Non-African American 27 (A) >60    GFR  33 (A) >60    Calcium 9.4 8.3 - 10.6 mg/dL    Total Protein 7.2 6.4 - 8.2 g/dL    Alb 3.9 3.4 - 5.0 g/dL    Albumin/Globulin Ratio 1.2 1.1 - 2.2    Total Bilirubin 0.5 0.0 - 1.0 mg/dL    Alkaline Phosphatase 121 40 - 129 U/L    ALT <5 (L) 10 - 40 U/L    AST 16 15 - 37 U/L    Globulin 3.3 g/dL   Troponin   Result Value Ref Range    Troponin <0.01 <0.01 ng/mL   APTT   Result Value Ref Range    aPTT 34.0 26.0 - 36.0 sec   Protime-INR   Result Value Ref Range    Protime 14.4 (H) 9.8 - 13.0 sec    INR 1.27 (H) 0.86 - 1.14   Brain Natriuretic Peptide   Result Value Ref Range    Pro-BNP 1,177 (H) 0 - 449 pg/mL   Blood Gas, Venous   Result Value Ref Range    pH, Harmeet 7.357 7.350 - 7.450    pCO2, Harmeet 63.3 (H) 40.0 - 50.0 mmHg    pO2, Harmeet 21.1 (L) 25.0 - 40.0 mmHg    HCO3, Venous 34.7 (H) 23.0 - 29.0 mmol/L    Base Excess, Harmeet 7.3 (H) -3.0 - 3.0 mmol/L    O2 Sat, Harmeet 32 Not Established %    Carboxyhemoglobin 2.5 (H) 0.0 - 1.5 %    MetHgb, Harmeet 0.1 <1.5 %    TC02 (Calc), Harmeet 37 Not Established mmol/L    O2 Content, Harmeet 5 Not Established VOL %    O2 Therapy Unknown    Urinalysis Reflex to Culture   Result Value Ref Range    Color, UA Yellow Straw/Yellow    Clarity, UA Clear Clear    Glucose, Ur Negative Negative mg/dL    Bilirubin Urine Negative Negative    Ketones, Urine Negative Negative mg/dL    Specific Gravity, UA 1.010 1.005 - 1.030    Blood, Urine Negative Negative    pH, UA 5.5 5.0 - 8.0    Protein, UA Negative Negative mg/dL    Urobilinogen, Urine 0.2 <2.0 E.U./dL    Nitrite, Urine Negative Negative

## 2018-12-10 NOTE — ED NOTES
Pt placed on 4 lpm due to desAt in 80's.   at bedside  Pt alert and oriented     Nancykatiana Jones RN  12/10/18 4525

## 2018-12-10 NOTE — ED NOTES
pts  came to nurses station, states wife is having an allergic reaction. Pt has hives and redness going up rt forearm. Dr Joanne Ulloa aware. Po benadryl. And pepcid ivp given. Pt alert and oriented. Vs stable. bp 129/89, 74, 100% 4 lpm, r 20. No other concern noted.       Fabrizio Rick RN  12/10/18 3526

## 2018-12-11 ENCOUNTER — APPOINTMENT (OUTPATIENT)
Dept: GENERAL RADIOLOGY | Age: 78
DRG: 189 | End: 2018-12-11
Payer: MEDICARE

## 2018-12-11 DIAGNOSIS — J45.901 ASTHMA WITH ACUTE EXACERBATION, UNSPECIFIED ASTHMA SEVERITY, UNSPECIFIED WHETHER PERSISTENT: ICD-10-CM

## 2018-12-11 LAB
ANION GAP SERPL CALCULATED.3IONS-SCNC: 10 MMOL/L (ref 3–16)
BASOPHILS ABSOLUTE: 0 K/UL (ref 0–0.2)
BASOPHILS RELATIVE PERCENT: 0.1 %
BUN BLDV-MCNC: 30 MG/DL (ref 7–20)
CALCIUM SERPL-MCNC: 8.4 MG/DL (ref 8.3–10.6)
CHLORIDE BLD-SCNC: 98 MMOL/L (ref 99–110)
CO2: 28 MMOL/L (ref 21–32)
CREAT SERPL-MCNC: 1.4 MG/DL (ref 0.6–1.2)
EKG ATRIAL RATE: 69 BPM
EKG DIAGNOSIS: NORMAL
EKG P AXIS: 63 DEGREES
EKG P-R INTERVAL: 174 MS
EKG Q-T INTERVAL: 414 MS
EKG QRS DURATION: 112 MS
EKG QTC CALCULATION (BAZETT): 443 MS
EKG R AXIS: 67 DEGREES
EKG T AXIS: 82 DEGREES
EKG VENTRICULAR RATE: 69 BPM
EOSINOPHILS ABSOLUTE: 0 K/UL (ref 0–0.6)
EOSINOPHILS RELATIVE PERCENT: 0 %
ESTIMATED AVERAGE GLUCOSE: 134.1 MG/DL
GFR AFRICAN AMERICAN: 44
GFR NON-AFRICAN AMERICAN: 37
GLUCOSE BLD-MCNC: 123 MG/DL (ref 70–99)
GLUCOSE BLD-MCNC: 128 MG/DL (ref 70–99)
GLUCOSE BLD-MCNC: 143 MG/DL (ref 70–99)
GLUCOSE BLD-MCNC: 159 MG/DL (ref 70–99)
GLUCOSE BLD-MCNC: 164 MG/DL (ref 70–99)
HBA1C MFR BLD: 6.3 %
HCT VFR BLD CALC: 33.3 % (ref 36–48)
HEMOGLOBIN: 10.7 G/DL (ref 12–16)
LYMPHOCYTES ABSOLUTE: 0.8 K/UL (ref 1–5.1)
LYMPHOCYTES RELATIVE PERCENT: 4 %
MCH RBC QN AUTO: 27.3 PG (ref 26–34)
MCHC RBC AUTO-ENTMCNC: 32.2 G/DL (ref 31–36)
MCV RBC AUTO: 84.7 FL (ref 80–100)
MONOCYTES ABSOLUTE: 0.1 K/UL (ref 0–1.3)
MONOCYTES RELATIVE PERCENT: 0.7 %
NEUTROPHILS ABSOLUTE: 18 K/UL (ref 1.7–7.7)
NEUTROPHILS RELATIVE PERCENT: 95.2 %
PDW BLD-RTO: 14.8 % (ref 12.4–15.4)
PERFORMED ON: ABNORMAL
PLATELET # BLD: 184 K/UL (ref 135–450)
PMV BLD AUTO: 8.9 FL (ref 5–10.5)
POTASSIUM REFLEX MAGNESIUM: 4.3 MMOL/L (ref 3.5–5.1)
RAPID INFLUENZA  B AGN: NEGATIVE
RAPID INFLUENZA A AGN: NEGATIVE
RBC # BLD: 3.93 M/UL (ref 4–5.2)
SODIUM BLD-SCNC: 136 MMOL/L (ref 136–145)
WBC # BLD: 19 K/UL (ref 4–11)

## 2018-12-11 PROCEDURE — 87070 CULTURE OTHR SPECIMN AEROBIC: CPT

## 2018-12-11 PROCEDURE — 94664 DEMO&/EVAL PT USE INHALER: CPT

## 2018-12-11 PROCEDURE — 80048 BASIC METABOLIC PNL TOTAL CA: CPT

## 2018-12-11 PROCEDURE — 87804 INFLUENZA ASSAY W/OPTIC: CPT

## 2018-12-11 PROCEDURE — 87205 SMEAR GRAM STAIN: CPT

## 2018-12-11 PROCEDURE — 71046 X-RAY EXAM CHEST 2 VIEWS: CPT

## 2018-12-11 PROCEDURE — 6370000000 HC RX 637 (ALT 250 FOR IP): Performed by: INTERNAL MEDICINE

## 2018-12-11 PROCEDURE — 99223 1ST HOSP IP/OBS HIGH 75: CPT | Performed by: INTERNAL MEDICINE

## 2018-12-11 PROCEDURE — 94761 N-INVAS EAR/PLS OXIMETRY MLT: CPT

## 2018-12-11 PROCEDURE — 93010 ELECTROCARDIOGRAM REPORT: CPT | Performed by: INTERNAL MEDICINE

## 2018-12-11 PROCEDURE — 99232 SBSQ HOSP IP/OBS MODERATE 35: CPT | Performed by: INTERNAL MEDICINE

## 2018-12-11 PROCEDURE — 36415 COLL VENOUS BLD VENIPUNCTURE: CPT

## 2018-12-11 PROCEDURE — 2060000000 HC ICU INTERMEDIATE R&B

## 2018-12-11 PROCEDURE — 6360000002 HC RX W HCPCS: Performed by: INTERNAL MEDICINE

## 2018-12-11 PROCEDURE — 2580000003 HC RX 258: Performed by: INTERNAL MEDICINE

## 2018-12-11 PROCEDURE — 94640 AIRWAY INHALATION TREATMENT: CPT

## 2018-12-11 PROCEDURE — 85025 COMPLETE CBC W/AUTO DIFF WBC: CPT

## 2018-12-11 RX ORDER — IPRATROPIUM BROMIDE AND ALBUTEROL SULFATE 2.5; .5 MG/3ML; MG/3ML
1 SOLUTION RESPIRATORY (INHALATION) EVERY 4 HOURS
Status: DISCONTINUED | OUTPATIENT
Start: 2018-12-11 | End: 2018-12-14

## 2018-12-11 RX ADMIN — PROPAFENONE HYDROCHLORIDE 150 MG: 150 TABLET, FILM COATED ORAL at 21:17

## 2018-12-11 RX ADMIN — ATORVASTATIN CALCIUM 40 MG: 40 TABLET, FILM COATED ORAL at 21:17

## 2018-12-11 RX ADMIN — CARBIDOPA AND LEVODOPA 1 TABLET: 25; 100 TABLET ORAL at 09:12

## 2018-12-11 RX ADMIN — CARBIDOPA AND LEVODOPA 1 TABLET: 25; 100 TABLET ORAL at 13:53

## 2018-12-11 RX ADMIN — FUROSEMIDE 40 MG: 40 TABLET ORAL at 09:12

## 2018-12-11 RX ADMIN — IPRATROPIUM BROMIDE AND ALBUTEROL SULFATE 1 AMPULE: .5; 3 SOLUTION RESPIRATORY (INHALATION) at 00:29

## 2018-12-11 RX ADMIN — PROPAFENONE HYDROCHLORIDE 150 MG: 150 TABLET, FILM COATED ORAL at 13:54

## 2018-12-11 RX ADMIN — IPRATROPIUM BROMIDE AND ALBUTEROL SULFATE 1 AMPULE: .5; 3 SOLUTION RESPIRATORY (INHALATION) at 15:31

## 2018-12-11 RX ADMIN — CARBIDOPA AND LEVODOPA 1 TABLET: 25; 100 TABLET ORAL at 21:17

## 2018-12-11 RX ADMIN — GABAPENTIN 100 MG: 100 CAPSULE ORAL at 17:23

## 2018-12-11 RX ADMIN — Medication 10 ML: at 21:18

## 2018-12-11 RX ADMIN — IPRATROPIUM BROMIDE AND ALBUTEROL SULFATE 1 AMPULE: .5; 3 SOLUTION RESPIRATORY (INHALATION) at 11:18

## 2018-12-11 RX ADMIN — APIXABAN 2.5 MG: 5 TABLET, FILM COATED ORAL at 21:17

## 2018-12-11 RX ADMIN — INSULIN LISPRO 1 UNITS: 100 INJECTION, SOLUTION INTRAVENOUS; SUBCUTANEOUS at 16:27

## 2018-12-11 RX ADMIN — DOXYCYCLINE HYCLATE 100 MG: 100 TABLET, COATED ORAL at 21:17

## 2018-12-11 RX ADMIN — DOXYCYCLINE HYCLATE 100 MG: 100 TABLET, COATED ORAL at 09:11

## 2018-12-11 RX ADMIN — LAMOTRIGINE 50 MG: 25 TABLET ORAL at 09:11

## 2018-12-11 RX ADMIN — IPRATROPIUM BROMIDE AND ALBUTEROL SULFATE 1 AMPULE: .5; 3 SOLUTION RESPIRATORY (INHALATION) at 03:34

## 2018-12-11 RX ADMIN — IPRATROPIUM BROMIDE AND ALBUTEROL SULFATE 1 AMPULE: .5; 3 SOLUTION RESPIRATORY (INHALATION) at 07:18

## 2018-12-11 RX ADMIN — METHYLPREDNISOLONE SODIUM SUCCINATE 40 MG: 40 INJECTION, POWDER, FOR SOLUTION INTRAMUSCULAR; INTRAVENOUS at 09:13

## 2018-12-11 RX ADMIN — Medication 10 ML: at 09:12

## 2018-12-11 RX ADMIN — DULOXETINE HYDROCHLORIDE 60 MG: 60 CAPSULE, DELAYED RELEASE ORAL at 09:11

## 2018-12-11 RX ADMIN — ASPIRIN 81 MG: 81 TABLET, COATED ORAL at 09:12

## 2018-12-11 RX ADMIN — MONTELUKAST SODIUM 10 MG: 10 TABLET, FILM COATED ORAL at 21:17

## 2018-12-11 RX ADMIN — APIXABAN 2.5 MG: 5 TABLET, FILM COATED ORAL at 09:12

## 2018-12-11 RX ADMIN — INSULIN LISPRO 1 UNITS: 100 INJECTION, SOLUTION INTRAVENOUS; SUBCUTANEOUS at 21:18

## 2018-12-11 RX ADMIN — IPRATROPIUM BROMIDE AND ALBUTEROL SULFATE 1 AMPULE: .5; 3 SOLUTION RESPIRATORY (INHALATION) at 20:19

## 2018-12-11 RX ADMIN — PROPAFENONE HYDROCHLORIDE 150 MG: 150 TABLET, FILM COATED ORAL at 05:48

## 2018-12-11 ASSESSMENT — PAIN SCALES - GENERAL
PAINLEVEL_OUTOF10: 0
PAINLEVEL_OUTOF10: 0

## 2018-12-11 NOTE — H&P
uncontrolled        Past Surgical History:        Procedure Laterality Date    APPENDECTOMY      CAROTID ENDARTERECTOMY      left    CATARACT REMOVAL      CERVICAL FUSION  11/2010    CHOLECYSTECTOMY      CORNEAL TRANSPLANT      EYE SURGERY      HERNIA REPAIR      HYSTERECTOMY      KNEE SURGERY      replacement x 2    SHOULDER SURGERY      TONSILLECTOMY      TUBAL LIGATION      VITRECTOMY         Medications Prior to Admission:    Prior to Admission medications    Medication Sig Start Date End Date Taking? Authorizing Provider   losartan (COZAAR) 25 MG tablet Take 25 mg by mouth daily   Yes Historical Provider, MD   gabapentin (NEURONTIN) 100 MG capsule Take 1 capsule by mouth every evening for 90 days. . 11/21/18 2/19/19 Yes Lucy Adams MD   apixaban (ELIQUIS) 2.5 MG TABS tablet Take 1 tablet by mouth 2 times daily 11/15/18  Yes BIJAN Ramirez   metoprolol tartrate (LOPRESSOR) 50 MG tablet Take 1 tablet by mouth 2 times daily 10/30/18  Yes Lucy Adams MD   ipratropium-albuterol (DUONEB) 0.5-2.5 (3) MG/3ML SOLN nebulizer solution NEBULIZE 1 VIAL 4 TIMES DAILY 10/12/18  Yes Lucy Adams MD   furosemide (LASIX) 40 MG tablet Take 1 tablet by mouth daily 10/4/18  Yes MAHAD Grimaldo CNP   propafenone (RYTHMOL) 150 MG tablet Take 1 tablet by mouth every 8 hours 10/3/18  Yes MAHAD Grimaldo CNP   ipratropium-albuterol (DUONEB) 0.5-2.5 (3) MG/3ML SOLN nebulizer solution Inhale 3 mLs into the lungs 4 times daily DX: J45.40 9/17/18  Yes Lucy Adams MD   carbidopa-levodopa (SINEMET)  MG per tablet TAKE 1 TABLET BY MOUTH 3 TIMES A DAY 7/30/18  Yes Lucy Adams MD   atorvastatin (LIPITOR) 40 MG tablet Take 1 and 1/2 tablets by mouth every evening 7/30/18  Yes Lucy Adams MD   montelukast (SINGULAIR) 10 MG tablet Take 1 tablet by mouth daily. 7/30/18  Yes Lucy Adams MD   celecoxib (CELEBREX) 200 MG capsule Take 1 capsule by mouth daily.  7/30/18  Yes Min De Los Santos Radha Howell MD   DULoxetine (CYMBALTA) 60 MG extended release capsule Take 1 capsule by mouth daily 7/30/18  Yes Kvng Rasmussen MD   albuterol sulfate HFA (VENTOLIN HFA) 108 (90 Base) MCG/ACT inhaler Inhale 2 puffs into the lungs every 6 hours as needed for Wheezing 7/30/18  Yes Kvng Rasmussen MD   fluticasone (FLONASE) 50 MCG/ACT nasal spray USE 2 PUFFS IN EACH NOSTRIL DAILY 7/24/18  Yes Kvng Rasmussen MD   salmeterol (SEREVENT) 50 MCG/DOSE diskus inhaler Inhale 1 puff by mouth 2 times a day. 5/11/18  Yes Kvng Rasmussen MD   budesonide (PULMICORT FLEXHALER) 180 MCG/ACT AEPB inhaler Inhale 2 puffs into the lungs 2 times daily 5/11/18  Yes Kvng Rasmussen MD   Misc. Devices (FOAM CHAIR CUSHION) MISC Seat cushion skin protection width 22 inches or greater  Diagnosis: Stage 2 sacral decubitus ulcer, L89.152  Duration: 99 4/13/18  Yes Kvng Rasmussen MD   Respiratory Therapy Supplies (NEBULIZER COMPRESSOR) KIT Use Q6 hours as needed with Duoneb. 3/5/18  Yes BIJAN Rogers   lamoTRIgine (LAMICTAL) 25 MG tablet Take 50 mg by mouth 2 times daily    Yes Historical Provider, MD   Multiple Vitamins-Minerals (THERAPEUTIC MULTIVITAMIN-MINERALS) tablet Take 1 tablet by mouth daily   Yes Historical Provider, MD   Alpha-Lipoic Acid 300 MG CAPS Take 300 mg by mouth daily   Yes Historical Provider, MD   Lift Chair MISC Use daily due to leg weakness. 12/14/15  Yes Kvng Rasmussen MD   Handicap Placard MISC by Does not apply route. 12/12/14-12/12/19 12/12/14  Yes Kvng Rasmussen MD   aspirin EC 81 MG EC tablet Take 1 tablet by mouth daily. Start taking next week. Indications: OTC 2/25/13  Yes Jacqueline Shaffer MD   solifenacin (VESICARE) 5 MG tablet Take 1 tablet by mouth daily. 6/25/13 10/29/13  Kvng Rasmussen MD       Allergies:  Levaquin [levofloxacin]; Pyridium [phenazopyridine hcl]; Reglan [metoclopramide hcl]; and Reglan [metoclopramide hcl]    Social History:    TOBACCO:   reports that she has never smoked.  She Ventricles and sulci are stable. Multiple images are limited due to artifact, particularly in the posterior fossa. 4th ventricle is midline. Mild low-density is noted in the periventricular white matter bilaterally, likely small vessel ischemic change. There is questionable faint low-density in the external capsules. No other areas of abnormal density are noted. There is no midline shift, hemorrhage, or extra-axial collection. ORBITS: The visualized portion of the orbits demonstrate no acute abnormality. SINUSES: The visualized paranasal sinuses and mastoid air cells demonstrate no acute abnormality. SOFT TISSUES/SKULL:  No acute abnormality of the visualized skull or soft tissues. Mild small vessel ischemic changes No acute abnormality otherwise     Xr Chest Portable    Result Date: 12/10/2018  EXAMINATION: SINGLE XRAY VIEW OF THE CHEST 12/10/2018 3:31 pm COMPARISON: Prior study(s) most recent 09/26/2018. HISTORY: ORDERING SYSTEM PROVIDED HISTORY: sob TECHNOLOGIST PROVIDED HISTORY: Reason for exam:->sob Ordering Physician Provided Reason for Exam: sob Acuity: Acute Type of Exam: Initial FINDINGS: The heart is upper normal to mildly enlarged but unchanged. Lungs are clear. Vessels are normal.  Prior cervical spine surgery evident. No acute cardiac or pulmonary disease.      EKG 12 Lead [[id number]] Collected: 12/10/18 1514   Updated: 12/10/18 1756     Ventricular Rate 69 BPM    Atrial Rate 69 BPM    P-R Interval 174 ms    QRS Duration 112 ms    Q-T Interval 414 ms    QTc Calculation (Bazett) 443 ms    P Axis 63 degrees    R Axis 67 degrees    T Axis 82 degrees    Diagnosis Normal sinus rhythmNormal ECGNo previous ECGs available       CBC   Recent Labs      12/10/18   1515   WBC  21.0*   HGB  11.5*   HCT  36.2   PLT  223      RENAL  Recent Labs      12/10/18   1239  12/10/18   1515   NA  140  137   K  4.5  4.8   CL  97*  94*   CO2  26  31   PHOS  3.9   --    BUN  28*  32*   CREATININE  1.6*  1.8*

## 2018-12-11 NOTE — TELEPHONE ENCOUNTER
.  Last office visit 9/14/2018     Last written  9-17-18 with 3 refills     Next office visit scheduled 12-11-18 today     Requested Prescriptions     Pending Prescriptions Disp Refills    ipratropium-albuterol (DUONEB) 0.5-2.5 (3) MG/3ML SOLN nebulizer solution [Pharmacy Med Name: IPRAT-ALBUT 0.5-3(2.5) MG/3 ML] 360 mL 3     Sig: NEBULIZE 1 VIAL 4 TIMES DAILY

## 2018-12-11 NOTE — PLAN OF CARE
Problem: OXYGENATION/RESPIRATORY FUNCTION  Goal: Patient will maintain patent airway  Outcome: Ongoing  Patient's EF (Ejection Fraction) is greater than 40%    Patient has a past medical history of Anemia, unspecified; Asthma; Baker's cyst; Blind left eye; Carotid artery stenoses; Carotid stenosis; Cervical spinal cord compression ([facility] 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-sided sciatica; CKD (chronic kidney disease) stage 3, GFR 30-59 ml/min ([facility]); Community acquired pneumonia of left lower lobe of lung ([facility] CRI; Detached retina, left; Diverticulosis; DJD (degenerative joint disease); Edema; Fatty liver; GERD (gastroesophageal reflux disease); Herniated lumbar intervertebral disc; Hyperlipidemia; Hypertension; Hypothyroid; Morbid obesity with BMI of 40.0-44.9, adult ([facility] BETSY treated with BiPAP; Restless leg syndrome; Restless legs syndrome; Sleep apnea; Tremor, essential; and Type II or unspecified type diabetes mellitus without mention of complication, not stated as uncontrolled. Comorbidities reviewed and education provided. Patient and family's stated goal of care: better understand heart failure and disease management prior to discharge    Patient's current functional capacity:  Slight limitation of physical activity. Comfortable at rest. Ordinary physical activity results in fatigue, palpitation, dyspnea. Pt resting in bed at this time on 1 L O2. Pt with complaints of shortness of breath. Pt with nonpitting lower extremity edema.  Patient's weights and intake/output reviewed:    Patient Vitals for the past 96 hrs (Last 3 readings):   Weight   12/11/18 0341 243 lb 3.2 oz (110.3 kg)   12/10/18 2015 245 lb 3.2 oz (111.2 kg)   12/10/18 1525 239 lb (108.4 kg)       Intake/Output Summary (Last 24 hours) at 12/11/18 0959  Last data filed at 12/11/18 0549   Gross per 24 hour   Intake              300 ml   Output              525 ml   Net             -225 ml       Patient provided with education on

## 2018-12-11 NOTE — FLOWSHEET NOTE
12/10/18 2015   Vital Signs   Temp 97 °F (36.1 °C)   Temp Source Oral   Pulse 77   Heart Rate Source Monitor   Resp 22   BP (!) 176/80   BP Location Left upper arm   Level of Consciousness 0   MEWS Score 2   Height and Weight   Height 5' 6\" (1.676 m)   Weight 245 lb 3.2 oz (111.2 kg)   Weight Method Bed scale   BSA (Calculated - sq m) 2.28 sq meters   BMI (Calculated) 39.7   Oxygen Therapy   SpO2 97 %   O2 Device Nasal cannula   O2 Flow Rate (L/min) 2 L/min     Patient arrived to PCU from [place] [address]. Patient in stable condition. NSR on telemetry. Currently on 2 L/min O2 via NC. Admission completed. Unsure of home medications. No further needs indicated at this time. Bed in low position with wheels locked. Call light in reach. Will continue to monitor.

## 2018-12-11 NOTE — PROGRESS NOTES
Medications:    1. If the patient lacks prior history of lung disease, is not using inhaled anti-inflammatory medication at home, and lacks wheezing by examination or by history for at least 24 hours, contact physician for possible discontinuation.

## 2018-12-11 NOTE — DISCHARGE INSTR - COC
Continuity of Care Form    Patient Name: Jayme Roper   :  1940  MRN:  [id number]    [facility] date:  12/10/2018  Discharge date:  2018    Code Status Order: Full Code   Advance Directives:   [facility] Documentation     Date/Time Healthcare Directive Type of Healthcare Directive Copy in [address] Agent's Name Healthcare Agent's Phone Number    12/10/18 2044  Yes, patient has an advance directive for healthcare treatment  Durable power of  for health care; Health care treatment directive  Yes, copy in chart  Healthcare power of   Carmel Boyd  [phone]          Admitting Physician:  Cindy Allen MD  PCP: Aneta Carranza MD    Discharging Nurse: [facility] Unit/Room#: /0363-00  Discharging Unit Phone Number: [phone]    Emergency Contact:   Extended Emergency Contact Information  Primary Emergency Contact:  Wesly Bowen  Address: [address] [place] [address]  Home Phone: [phone]  Work Phone: [phone]  Mobile Phone: [phone]  Relation: Spouse    Past Surgical History:  Past Surgical History:   Procedure Laterality Date    APPENDECTOMY      CAROTID ENDARTERECTOMY      left    CATARACT REMOVAL      CERVICAL FUSION  2010    CHOLECYSTECTOMY      CORNEAL TRANSPLANT      EYE SURGERY      HERNIA REPAIR      HYSTERECTOMY      KNEE SURGERY      replacement x 2    SHOULDER SURGERY      TONSILLECTOMY      TUBAL LIGATION      VITRECTOMY         Immunization History:   Immunization History   Administered Date(s) Administered    Influenza Vaccine, unspecified formulation 2016    Influenza Virus Vaccine 2011, 10/01/2012    Influenza Whole 2015    Influenza, High Dose (Fluzone 65 yrs and older) 09/15/2017, 2018    Pneumococcal 13-valent Conjugate (Sputleb17) 2015    Pneumococcal Polysaccharide ([id number]) 2007       Active and Speech Therapy  Weight Bearing Status/Restrictions: No weight bearing restirctions  Other Medical Equipment (for information only, NOT a DME order): Other Treatments:     Patient's personal belongings (please select all that are sent with patient):  None    RN SIGNATURE:  Electronically signed by Berna Thomas RN on 12/14/18 at 2:19 PM    CASE MANAGEMENT/SOCIAL WORK SECTION    Inpatient Status Date: 12/10/2018    Readmission Risk Assessment Score:  Readmission Risk              Risk of Unplanned Readmission:        26           Discharging to Facility/ Agency   · Name: [facility]  · Address:  · Phone: [phone]  · Fax: [phone]    Dialysis Facility (if applicable)   · Name:  · Address:  · Dialysis Schedule:  · Phone:  · Fax:    / signature: Electronically signed by Faiza Overton RN on 12/14/18 at 1:51 PM    PHYSICIAN SECTION    Prognosis: Good    Condition at Discharge: Stable    Rehab Potential (if transferring to Rehab):     Recommended Labs or Other Treatments After Discharge:     Physician Certification: I certify the above information and transfer of Christina Calvin  is necessary for the continuing treatment of the diagnosis listed and that she requires Home Care for less 30 days.      Update Admission H&P: No change in H&P    PHYSICIAN SIGNATURE:  CAROLIN Medina MD/Electronically signed by Faiza Overton RN on 12/14/18 at 1:51 PM

## 2018-12-11 NOTE — PROGRESS NOTES
FYI:  Duloxetine use should be avoided for patients with CrCl < 30 ml/min. SrCr = 1.8;  CrCl = 24.9 ml/min.   Stevie Moore R.Ph.12/10/84953:09 PM

## 2018-12-12 LAB
GLUCOSE BLD-MCNC: 105 MG/DL (ref 70–99)
GLUCOSE BLD-MCNC: 114 MG/DL (ref 70–99)
GLUCOSE BLD-MCNC: 154 MG/DL (ref 70–99)
GLUCOSE BLD-MCNC: 165 MG/DL (ref 70–99)
PERFORMED ON: ABNORMAL
REPORT: NORMAL

## 2018-12-12 PROCEDURE — 2700000000 HC OXYGEN THERAPY PER DAY

## 2018-12-12 PROCEDURE — 2580000003 HC RX 258: Performed by: INTERNAL MEDICINE

## 2018-12-12 PROCEDURE — 99232 SBSQ HOSP IP/OBS MODERATE 35: CPT | Performed by: INTERNAL MEDICINE

## 2018-12-12 PROCEDURE — 1200000000 HC SEMI PRIVATE

## 2018-12-12 PROCEDURE — 6370000000 HC RX 637 (ALT 250 FOR IP): Performed by: INTERNAL MEDICINE

## 2018-12-12 PROCEDURE — 94668 MNPJ CHEST WALL SBSQ: CPT

## 2018-12-12 PROCEDURE — 94761 N-INVAS EAR/PLS OXIMETRY MLT: CPT

## 2018-12-12 PROCEDURE — 94667 MNPJ CHEST WALL 1ST: CPT

## 2018-12-12 PROCEDURE — 2060000000 HC ICU INTERMEDIATE R&B

## 2018-12-12 PROCEDURE — 6360000002 HC RX W HCPCS: Performed by: INTERNAL MEDICINE

## 2018-12-12 PROCEDURE — 94664 DEMO&/EVAL PT USE INHALER: CPT

## 2018-12-12 PROCEDURE — 94640 AIRWAY INHALATION TREATMENT: CPT

## 2018-12-12 RX ORDER — METHYLPREDNISOLONE SODIUM SUCCINATE 40 MG/ML
40 INJECTION, POWDER, LYOPHILIZED, FOR SOLUTION INTRAMUSCULAR; INTRAVENOUS EVERY 12 HOURS
Status: DISCONTINUED | OUTPATIENT
Start: 2018-12-12 | End: 2018-12-13

## 2018-12-12 RX ORDER — HYDRALAZINE HYDROCHLORIDE 20 MG/ML
10 INJECTION INTRAMUSCULAR; INTRAVENOUS EVERY 6 HOURS PRN
Status: DISCONTINUED | OUTPATIENT
Start: 2018-12-12 | End: 2018-12-14 | Stop reason: HOSPADM

## 2018-12-12 RX ORDER — LOSARTAN POTASSIUM 25 MG/1
25 TABLET ORAL DAILY
Status: DISCONTINUED | OUTPATIENT
Start: 2018-12-12 | End: 2018-12-14 | Stop reason: HOSPADM

## 2018-12-12 RX ORDER — LAMOTRIGINE 100 MG/1
50 TABLET ORAL 2 TIMES DAILY
Status: DISCONTINUED | OUTPATIENT
Start: 2018-12-12 | End: 2018-12-14 | Stop reason: HOSPADM

## 2018-12-12 RX ORDER — METOPROLOL TARTRATE 50 MG/1
50 TABLET, FILM COATED ORAL 2 TIMES DAILY
Status: DISCONTINUED | OUTPATIENT
Start: 2018-12-12 | End: 2018-12-14 | Stop reason: HOSPADM

## 2018-12-12 RX ADMIN — METHYLPREDNISOLONE SODIUM SUCCINATE 40 MG: 40 INJECTION, POWDER, FOR SOLUTION INTRAMUSCULAR; INTRAVENOUS at 21:41

## 2018-12-12 RX ADMIN — DOXYCYCLINE HYCLATE 100 MG: 100 TABLET, COATED ORAL at 09:10

## 2018-12-12 RX ADMIN — MONTELUKAST SODIUM 10 MG: 10 TABLET, FILM COATED ORAL at 21:41

## 2018-12-12 RX ADMIN — IPRATROPIUM BROMIDE AND ALBUTEROL SULFATE 1 AMPULE: .5; 3 SOLUTION RESPIRATORY (INHALATION) at 07:19

## 2018-12-12 RX ADMIN — IPRATROPIUM BROMIDE AND ALBUTEROL SULFATE 1 AMPULE: .5; 3 SOLUTION RESPIRATORY (INHALATION) at 11:16

## 2018-12-12 RX ADMIN — PROPAFENONE HYDROCHLORIDE 150 MG: 150 TABLET, FILM COATED ORAL at 21:45

## 2018-12-12 RX ADMIN — FUROSEMIDE 40 MG: 40 TABLET ORAL at 09:11

## 2018-12-12 RX ADMIN — LOSARTAN POTASSIUM 25 MG: 25 TABLET, FILM COATED ORAL at 13:27

## 2018-12-12 RX ADMIN — Medication 5 ML: at 18:23

## 2018-12-12 RX ADMIN — INSULIN LISPRO 1 UNITS: 100 INJECTION, SOLUTION INTRAVENOUS; SUBCUTANEOUS at 21:55

## 2018-12-12 RX ADMIN — PROPAFENONE HYDROCHLORIDE 150 MG: 150 TABLET, FILM COATED ORAL at 05:22

## 2018-12-12 RX ADMIN — METHYLPREDNISOLONE SODIUM SUCCINATE 40 MG: 40 INJECTION, POWDER, FOR SOLUTION INTRAMUSCULAR; INTRAVENOUS at 09:11

## 2018-12-12 RX ADMIN — DULOXETINE HYDROCHLORIDE 60 MG: 60 CAPSULE, DELAYED RELEASE ORAL at 09:10

## 2018-12-12 RX ADMIN — HYDRALAZINE HYDROCHLORIDE 10 MG: 20 INJECTION INTRAMUSCULAR; INTRAVENOUS at 05:08

## 2018-12-12 RX ADMIN — IPRATROPIUM BROMIDE AND ALBUTEROL SULFATE 1 AMPULE: .5; 3 SOLUTION RESPIRATORY (INHALATION) at 00:02

## 2018-12-12 RX ADMIN — NYSTATIN 5 ML: 100000 SUSPENSION ORAL at 11:46

## 2018-12-12 RX ADMIN — IPRATROPIUM BROMIDE AND ALBUTEROL SULFATE 1 AMPULE: .5; 3 SOLUTION RESPIRATORY (INHALATION) at 03:09

## 2018-12-12 RX ADMIN — PROPAFENONE HYDROCHLORIDE 150 MG: 150 TABLET, FILM COATED ORAL at 13:21

## 2018-12-12 RX ADMIN — METOPROLOL TARTRATE 50 MG: 50 TABLET ORAL at 21:41

## 2018-12-12 RX ADMIN — Medication 5 ML: at 13:21

## 2018-12-12 RX ADMIN — IPRATROPIUM BROMIDE AND ALBUTEROL SULFATE 1 AMPULE: .5; 3 SOLUTION RESPIRATORY (INHALATION) at 15:16

## 2018-12-12 RX ADMIN — APIXABAN 2.5 MG: 5 TABLET, FILM COATED ORAL at 09:11

## 2018-12-12 RX ADMIN — LAMOTRIGINE 50 MG: 100 TABLET ORAL at 09:27

## 2018-12-12 RX ADMIN — GABAPENTIN 100 MG: 100 CAPSULE ORAL at 18:14

## 2018-12-12 RX ADMIN — CARBIDOPA AND LEVODOPA 1 TABLET: 25; 100 TABLET ORAL at 13:21

## 2018-12-12 RX ADMIN — IPRATROPIUM BROMIDE AND ALBUTEROL SULFATE 1 AMPULE: .5; 3 SOLUTION RESPIRATORY (INHALATION) at 19:14

## 2018-12-12 RX ADMIN — METOPROLOL TARTRATE 50 MG: 50 TABLET ORAL at 13:27

## 2018-12-12 RX ADMIN — CARBIDOPA AND LEVODOPA 1 TABLET: 25; 100 TABLET ORAL at 09:10

## 2018-12-12 RX ADMIN — ASPIRIN 81 MG: 81 TABLET, COATED ORAL at 09:10

## 2018-12-12 RX ADMIN — HYDRALAZINE HYDROCHLORIDE 10 MG: 20 INJECTION INTRAMUSCULAR; INTRAVENOUS at 11:57

## 2018-12-12 RX ADMIN — Medication 10 ML: at 09:11

## 2018-12-12 RX ADMIN — DOXYCYCLINE HYCLATE 100 MG: 100 TABLET, COATED ORAL at 21:40

## 2018-12-12 RX ADMIN — IPRATROPIUM BROMIDE AND ALBUTEROL SULFATE 1 AMPULE: .5; 3 SOLUTION RESPIRATORY (INHALATION) at 23:05

## 2018-12-12 RX ADMIN — LAMOTRIGINE 50 MG: 100 TABLET ORAL at 21:41

## 2018-12-12 RX ADMIN — Medication 10 ML: at 21:41

## 2018-12-12 RX ADMIN — INSULIN LISPRO 1 UNITS: 100 INJECTION, SOLUTION INTRAVENOUS; SUBCUTANEOUS at 16:44

## 2018-12-12 RX ADMIN — NYSTATIN 5 ML: 100000 SUSPENSION ORAL at 18:14

## 2018-12-12 RX ADMIN — ATORVASTATIN CALCIUM 40 MG: 40 TABLET, FILM COATED ORAL at 21:41

## 2018-12-12 RX ADMIN — CARBIDOPA AND LEVODOPA 1 TABLET: 25; 100 TABLET ORAL at 21:41

## 2018-12-12 RX ADMIN — APIXABAN 2.5 MG: 5 TABLET, FILM COATED ORAL at 21:41

## 2018-12-12 ASSESSMENT — PAIN SCALES - GENERAL
PAINLEVEL_OUTOF10: 0
PAINLEVEL_OUTOF10: 0

## 2018-12-12 NOTE — PROGRESS NOTES
Patient handoff given to nurse [facility]. Patient stable at handoff.   Bethany Hermosillo RN

## 2018-12-12 NOTE — PROGRESS NOTES
neg    2. AeAsthma/aeCOPD  -continue  IV solumedrol, doxycycline by mouth, PRN/WOLFGANG intensive NEB therapy. - Check respiratory culture. Negative so far  - Pulm consult. 3. Acute encephalopathy, possibly related to hypercapnia  - resolved now   -  CT head negative for acute. 4. CKD (III)   creatinine 1.8 - > 1.4, appears to be at baseline    5. Leukocytosis, likely reactive  - continue doxy  - WBC 21 K to 19 K. Monitor white blood count    6. Grade 1 diastolic CHF  -  BNP 7262  - Placed on Lasix    7. HTN  - resume losartan, metoprolol    8. Obesity   BMI 40.4    9. Paroxysmal A.  Fib  - on Rythmol , metoprolol and eliquis  -Heart rate remained stable normal sinus rhythm          DVT Prophylaxis: Eliquis  Diet: Carb control  Code Status: Full Code         Tata Atkins MD 12/12/2018 12:20 PM

## 2018-12-13 LAB
ANION GAP SERPL CALCULATED.3IONS-SCNC: 10 MMOL/L (ref 3–16)
BUN BLDV-MCNC: 33 MG/DL (ref 7–20)
CALCIUM SERPL-MCNC: 9.1 MG/DL (ref 8.3–10.6)
CHLORIDE BLD-SCNC: 96 MMOL/L (ref 99–110)
CO2: 30 MMOL/L (ref 21–32)
CREAT SERPL-MCNC: 1.2 MG/DL (ref 0.6–1.2)
CULTURE, RESPIRATORY: NORMAL
GFR AFRICAN AMERICAN: 53
GFR NON-AFRICAN AMERICAN: 44
GLUCOSE BLD-MCNC: 122 MG/DL (ref 70–99)
GLUCOSE BLD-MCNC: 140 MG/DL (ref 70–99)
GLUCOSE BLD-MCNC: 151 MG/DL (ref 70–99)
GLUCOSE BLD-MCNC: 164 MG/DL (ref 70–99)
GLUCOSE BLD-MCNC: 193 MG/DL (ref 70–99)
GRAM STAIN RESULT: NORMAL
HCT VFR BLD CALC: 34.9 % (ref 36–48)
HEMOGLOBIN: 11.1 G/DL (ref 12–16)
MAGNESIUM: 2.1 MG/DL (ref 1.8–2.4)
MCH RBC QN AUTO: 26.7 PG (ref 26–34)
MCHC RBC AUTO-ENTMCNC: 31.7 G/DL (ref 31–36)
MCV RBC AUTO: 84.1 FL (ref 80–100)
PDW BLD-RTO: 15.2 % (ref 12.4–15.4)
PERFORMED ON: ABNORMAL
PHOSPHORUS: 3.8 MG/DL (ref 2.5–4.9)
PLATELET # BLD: 268 K/UL (ref 135–450)
PMV BLD AUTO: 9.2 FL (ref 5–10.5)
POTASSIUM SERPL-SCNC: 5 MMOL/L (ref 3.5–5.1)
RBC # BLD: 4.15 M/UL (ref 4–5.2)
SODIUM BLD-SCNC: 136 MMOL/L (ref 136–145)
WBC # BLD: 15.8 K/UL (ref 4–11)

## 2018-12-13 PROCEDURE — 2580000003 HC RX 258: Performed by: INTERNAL MEDICINE

## 2018-12-13 PROCEDURE — 6370000000 HC RX 637 (ALT 250 FOR IP): Performed by: INTERNAL MEDICINE

## 2018-12-13 PROCEDURE — 94669 MECHANICAL CHEST WALL OSCILL: CPT

## 2018-12-13 PROCEDURE — G8988 SELF CARE GOAL STATUS: HCPCS

## 2018-12-13 PROCEDURE — 99232 SBSQ HOSP IP/OBS MODERATE 35: CPT | Performed by: INTERNAL MEDICINE

## 2018-12-13 PROCEDURE — 97161 PT EVAL LOW COMPLEX 20 MIN: CPT

## 2018-12-13 PROCEDURE — 99232 SBSQ HOSP IP/OBS MODERATE 35: CPT | Performed by: PHYSICIAN ASSISTANT

## 2018-12-13 PROCEDURE — 80048 BASIC METABOLIC PNL TOTAL CA: CPT

## 2018-12-13 PROCEDURE — 1200000000 HC SEMI PRIVATE

## 2018-12-13 PROCEDURE — 94667 MNPJ CHEST WALL 1ST: CPT

## 2018-12-13 PROCEDURE — 94761 N-INVAS EAR/PLS OXIMETRY MLT: CPT

## 2018-12-13 PROCEDURE — G8978 MOBILITY CURRENT STATUS: HCPCS

## 2018-12-13 PROCEDURE — 83735 ASSAY OF MAGNESIUM: CPT

## 2018-12-13 PROCEDURE — 97535 SELF CARE MNGMENT TRAINING: CPT

## 2018-12-13 PROCEDURE — 97116 GAIT TRAINING THERAPY: CPT

## 2018-12-13 PROCEDURE — 97166 OT EVAL MOD COMPLEX 45 MIN: CPT

## 2018-12-13 PROCEDURE — G8987 SELF CARE CURRENT STATUS: HCPCS

## 2018-12-13 PROCEDURE — 84100 ASSAY OF PHOSPHORUS: CPT

## 2018-12-13 PROCEDURE — 97530 THERAPEUTIC ACTIVITIES: CPT

## 2018-12-13 PROCEDURE — G8979 MOBILITY GOAL STATUS: HCPCS

## 2018-12-13 PROCEDURE — 6360000002 HC RX W HCPCS: Performed by: INTERNAL MEDICINE

## 2018-12-13 PROCEDURE — 36415 COLL VENOUS BLD VENIPUNCTURE: CPT

## 2018-12-13 PROCEDURE — 85027 COMPLETE CBC AUTOMATED: CPT

## 2018-12-13 PROCEDURE — 94668 MNPJ CHEST WALL SBSQ: CPT

## 2018-12-13 PROCEDURE — 94640 AIRWAY INHALATION TREATMENT: CPT

## 2018-12-13 RX ORDER — IPRATROPIUM BROMIDE AND ALBUTEROL SULFATE 2.5; .5 MG/3ML; MG/3ML
SOLUTION RESPIRATORY (INHALATION)
Qty: 360 ML | Refills: 3 | Status: SHIPPED | OUTPATIENT
Start: 2018-12-13 | End: 2018-12-14 | Stop reason: HOSPADM

## 2018-12-13 RX ORDER — PREDNISONE 20 MG/1
40 TABLET ORAL DAILY
Status: DISCONTINUED | OUTPATIENT
Start: 2018-12-14 | End: 2018-12-14 | Stop reason: HOSPADM

## 2018-12-13 RX ADMIN — ASPIRIN 81 MG: 81 TABLET, COATED ORAL at 10:10

## 2018-12-13 RX ADMIN — LAMOTRIGINE 50 MG: 100 TABLET ORAL at 20:07

## 2018-12-13 RX ADMIN — MONTELUKAST SODIUM 10 MG: 10 TABLET, FILM COATED ORAL at 20:06

## 2018-12-13 RX ADMIN — DOXYCYCLINE HYCLATE 100 MG: 100 TABLET, COATED ORAL at 20:06

## 2018-12-13 RX ADMIN — IPRATROPIUM BROMIDE AND ALBUTEROL SULFATE 1 AMPULE: .5; 3 SOLUTION RESPIRATORY (INHALATION) at 03:23

## 2018-12-13 RX ADMIN — FUROSEMIDE 40 MG: 40 TABLET ORAL at 10:11

## 2018-12-13 RX ADMIN — ATORVASTATIN CALCIUM 40 MG: 40 TABLET, FILM COATED ORAL at 20:06

## 2018-12-13 RX ADMIN — DULOXETINE HYDROCHLORIDE 60 MG: 60 CAPSULE, DELAYED RELEASE ORAL at 10:10

## 2018-12-13 RX ADMIN — METOPROLOL TARTRATE 50 MG: 50 TABLET ORAL at 10:11

## 2018-12-13 RX ADMIN — Medication 10 ML: at 10:12

## 2018-12-13 RX ADMIN — PROPAFENONE HYDROCHLORIDE 150 MG: 150 TABLET, FILM COATED ORAL at 15:03

## 2018-12-13 RX ADMIN — INSULIN LISPRO 1 UNITS: 100 INJECTION, SOLUTION INTRAVENOUS; SUBCUTANEOUS at 12:35

## 2018-12-13 RX ADMIN — METOPROLOL TARTRATE 50 MG: 50 TABLET ORAL at 20:06

## 2018-12-13 RX ADMIN — METHYLPREDNISOLONE SODIUM SUCCINATE 40 MG: 40 INJECTION, POWDER, FOR SOLUTION INTRAMUSCULAR; INTRAVENOUS at 10:12

## 2018-12-13 RX ADMIN — IPRATROPIUM BROMIDE AND ALBUTEROL SULFATE 1 AMPULE: .5; 3 SOLUTION RESPIRATORY (INHALATION) at 15:16

## 2018-12-13 RX ADMIN — APIXABAN 2.5 MG: 5 TABLET, FILM COATED ORAL at 10:11

## 2018-12-13 RX ADMIN — GABAPENTIN 100 MG: 100 CAPSULE ORAL at 17:15

## 2018-12-13 RX ADMIN — Medication 5 ML: at 22:47

## 2018-12-13 RX ADMIN — IPRATROPIUM BROMIDE AND ALBUTEROL SULFATE 1 AMPULE: .5; 3 SOLUTION RESPIRATORY (INHALATION) at 19:49

## 2018-12-13 RX ADMIN — INSULIN LISPRO 1 UNITS: 100 INJECTION, SOLUTION INTRAVENOUS; SUBCUTANEOUS at 21:29

## 2018-12-13 RX ADMIN — HYDRALAZINE HYDROCHLORIDE 10 MG: 20 INJECTION INTRAMUSCULAR; INTRAVENOUS at 23:56

## 2018-12-13 RX ADMIN — IPRATROPIUM BROMIDE AND ALBUTEROL SULFATE 1 AMPULE: .5; 3 SOLUTION RESPIRATORY (INHALATION) at 23:12

## 2018-12-13 RX ADMIN — PROPAFENONE HYDROCHLORIDE 150 MG: 150 TABLET, FILM COATED ORAL at 21:29

## 2018-12-13 RX ADMIN — IPRATROPIUM BROMIDE AND ALBUTEROL SULFATE 1 AMPULE: .5; 3 SOLUTION RESPIRATORY (INHALATION) at 07:19

## 2018-12-13 RX ADMIN — DOXYCYCLINE HYCLATE 100 MG: 100 TABLET, COATED ORAL at 10:10

## 2018-12-13 RX ADMIN — APIXABAN 2.5 MG: 5 TABLET, FILM COATED ORAL at 20:07

## 2018-12-13 RX ADMIN — CARBIDOPA AND LEVODOPA 1 TABLET: 25; 100 TABLET ORAL at 15:03

## 2018-12-13 RX ADMIN — LOSARTAN POTASSIUM 25 MG: 25 TABLET, FILM COATED ORAL at 10:11

## 2018-12-13 RX ADMIN — CARBIDOPA AND LEVODOPA 1 TABLET: 25; 100 TABLET ORAL at 20:07

## 2018-12-13 RX ADMIN — LAMOTRIGINE 50 MG: 100 TABLET ORAL at 10:10

## 2018-12-13 RX ADMIN — INSULIN LISPRO 1 UNITS: 100 INJECTION, SOLUTION INTRAVENOUS; SUBCUTANEOUS at 17:15

## 2018-12-13 RX ADMIN — CARBIDOPA AND LEVODOPA 1 TABLET: 25; 100 TABLET ORAL at 10:11

## 2018-12-13 RX ADMIN — Medication 10 ML: at 20:06

## 2018-12-13 RX ADMIN — IPRATROPIUM BROMIDE AND ALBUTEROL SULFATE 1 AMPULE: .5; 3 SOLUTION RESPIRATORY (INHALATION) at 11:18

## 2018-12-13 RX ADMIN — PROPAFENONE HYDROCHLORIDE 150 MG: 150 TABLET, FILM COATED ORAL at 05:37

## 2018-12-13 NOTE — PROGRESS NOTES
Pulmonary Progress Note  CC: Cough and congestion    Subjective:  Feels better. Wants to get out of bed    EXAM:   BP (!) 155/53   Pulse 65   Temp 97.4 °F (36.3 °C) (Oral)   Resp 18   Ht 5' 6\" (1.676 m)   Wt 247 lb 11.2 oz (112.4 kg)   SpO2 90%   BMI 39.98 kg/m²  on room air  Constitutional:  No acute distress   HEENT: no scleral icterus. Erythema post pharynx. No clear thrush  Neck: No tracheal deviation present. Cardiovascular: Normal heart sounds. Pulmonary/Chest: diffuse wheezes. + rhonchi. No rales. No decreased breath sounds. No accessory muscle usage or stridor. Abdominal: Soft. Musculoskeletal: No cyanosis. No clubbing. Skin: Skin is warm and dry.      Scheduled Meds:   lamoTRIgine  50 mg Oral BID    methylPREDNISolone  40 mg Intravenous Q12H    losartan  25 mg Oral Daily    metoprolol tartrate  50 mg Oral BID    ipratropium-albuterol  1 ampule Inhalation Q4H    apixaban  2.5 mg Oral BID    aspirin EC  81 mg Oral Daily    atorvastatin  40 mg Oral Nightly    DULoxetine  60 mg Oral Daily    carbidopa-levodopa  1 tablet Oral TID    furosemide  40 mg Oral Daily    montelukast  10 mg Oral Nightly    propafenone  150 mg Oral 3 times per day    gabapentin  100 mg Oral QPM    sodium chloride flush  10 mL Intravenous 2 times per day    doxycycline hyclate  100 mg Oral 2 times per day    insulin lispro  0-6 Units Subcutaneous TID WC    insulin lispro  0-3 Units Subcutaneous Nightly     Continuous Infusions:   dextrose       PRN Meds:  hydrALAZINE, HYDROcodone-homatropine, magic (miracle) mouthwash with nystatin, sodium chloride flush, prochlorperazine, glucose, dextrose, glucagon (rDNA), dextrose    Labs:  CBC:   Recent Labs      12/10/18   1515  12/11/18   0547  12/13/18   0717   WBC  21.0*  19.0*  15.8*   HGB  11.5*  10.7*  11.1*   HCT  36.2  33.3*  34.9*   MCV  84.5  84.7  84.1   PLT  223  184  268     BMP:   Recent Labs      12/10/18   1239  12/10/18   1515  12/11/18   0547

## 2018-12-13 NOTE — PROGRESS NOTES
Pt in bed, eyes closed. No distress noted. Call light in reach. Will continue to monitor.   Vanda Ramírez

## 2018-12-14 VITALS
RESPIRATION RATE: 20 BRPM | SYSTOLIC BLOOD PRESSURE: 108 MMHG | BODY MASS INDEX: 37.84 KG/M2 | OXYGEN SATURATION: 92 % | DIASTOLIC BLOOD PRESSURE: 48 MMHG | WEIGHT: 235.45 LBS | TEMPERATURE: 98.1 F | HEART RATE: 67 BPM | HEIGHT: 66 IN

## 2018-12-14 LAB
ANION GAP SERPL CALCULATED.3IONS-SCNC: 9 MMOL/L (ref 3–16)
BASOPHILS ABSOLUTE: 0 K/UL (ref 0–0.2)
BASOPHILS RELATIVE PERCENT: 0.1 %
BUN BLDV-MCNC: 38 MG/DL (ref 7–20)
CALCIUM SERPL-MCNC: 8.8 MG/DL (ref 8.3–10.6)
CHLORIDE BLD-SCNC: 100 MMOL/L (ref 99–110)
CO2: 28 MMOL/L (ref 21–32)
CREAT SERPL-MCNC: 1.5 MG/DL (ref 0.6–1.2)
CULTURE, BLOOD 2: ABNORMAL
EOSINOPHILS ABSOLUTE: 0 K/UL (ref 0–0.6)
EOSINOPHILS RELATIVE PERCENT: 0.1 %
GFR AFRICAN AMERICAN: 41
GFR NON-AFRICAN AMERICAN: 34
GLUCOSE BLD-MCNC: 103 MG/DL (ref 70–99)
GLUCOSE BLD-MCNC: 93 MG/DL (ref 70–99)
GLUCOSE BLD-MCNC: 95 MG/DL (ref 70–99)
HCT VFR BLD CALC: 36.6 % (ref 36–48)
HEMOGLOBIN: 11.5 G/DL (ref 12–16)
LYMPHOCYTES ABSOLUTE: 2.2 K/UL (ref 1–5.1)
LYMPHOCYTES RELATIVE PERCENT: 18.5 %
MCH RBC QN AUTO: 26.9 PG (ref 26–34)
MCHC RBC AUTO-ENTMCNC: 31.4 G/DL (ref 31–36)
MCV RBC AUTO: 85.7 FL (ref 80–100)
MONOCYTES ABSOLUTE: 1.1 K/UL (ref 0–1.3)
MONOCYTES RELATIVE PERCENT: 9 %
NEUTROPHILS ABSOLUTE: 8.6 K/UL (ref 1.7–7.7)
NEUTROPHILS RELATIVE PERCENT: 72.3 %
ORGANISM: ABNORMAL
ORGANISM: ABNORMAL
PDW BLD-RTO: 14.9 % (ref 12.4–15.4)
PERFORMED ON: ABNORMAL
PERFORMED ON: NORMAL
PLATELET # BLD: 247 K/UL (ref 135–450)
PMV BLD AUTO: 8.5 FL (ref 5–10.5)
POTASSIUM REFLEX MAGNESIUM: 3.9 MMOL/L (ref 3.5–5.1)
RBC # BLD: 4.26 M/UL (ref 4–5.2)
SODIUM BLD-SCNC: 137 MMOL/L (ref 136–145)
WBC # BLD: 11.9 K/UL (ref 4–11)

## 2018-12-14 PROCEDURE — 94640 AIRWAY INHALATION TREATMENT: CPT

## 2018-12-14 PROCEDURE — 80048 BASIC METABOLIC PNL TOTAL CA: CPT

## 2018-12-14 PROCEDURE — 99238 HOSP IP/OBS DSCHRG MGMT 30/<: CPT | Performed by: INTERNAL MEDICINE

## 2018-12-14 PROCEDURE — 94150 VITAL CAPACITY TEST: CPT

## 2018-12-14 PROCEDURE — 6370000000 HC RX 637 (ALT 250 FOR IP): Performed by: INTERNAL MEDICINE

## 2018-12-14 PROCEDURE — G8988 SELF CARE GOAL STATUS: HCPCS

## 2018-12-14 PROCEDURE — 85025 COMPLETE CBC W/AUTO DIFF WBC: CPT

## 2018-12-14 PROCEDURE — 2700000000 HC OXYGEN THERAPY PER DAY

## 2018-12-14 PROCEDURE — G8987 SELF CARE CURRENT STATUS: HCPCS

## 2018-12-14 PROCEDURE — 99232 SBSQ HOSP IP/OBS MODERATE 35: CPT | Performed by: INTERNAL MEDICINE

## 2018-12-14 PROCEDURE — 2580000003 HC RX 258: Performed by: INTERNAL MEDICINE

## 2018-12-14 PROCEDURE — 36415 COLL VENOUS BLD VENIPUNCTURE: CPT

## 2018-12-14 PROCEDURE — 97530 THERAPEUTIC ACTIVITIES: CPT

## 2018-12-14 PROCEDURE — 6360000002 HC RX W HCPCS: Performed by: INTERNAL MEDICINE

## 2018-12-14 PROCEDURE — 97535 SELF CARE MNGMENT TRAINING: CPT

## 2018-12-14 PROCEDURE — 94668 MNPJ CHEST WALL SBSQ: CPT

## 2018-12-14 PROCEDURE — 94664 DEMO&/EVAL PT USE INHALER: CPT

## 2018-12-14 PROCEDURE — 94761 N-INVAS EAR/PLS OXIMETRY MLT: CPT

## 2018-12-14 RX ORDER — ACETAMINOPHEN 325 MG/1
650 TABLET ORAL EVERY 4 HOURS PRN
Status: DISCONTINUED | OUTPATIENT
Start: 2018-12-14 | End: 2018-12-14 | Stop reason: HOSPADM

## 2018-12-14 RX ORDER — DOXYCYCLINE HYCLATE 100 MG
100 TABLET ORAL EVERY 12 HOURS SCHEDULED
Qty: 12 TABLET | Refills: 0 | Status: SHIPPED | OUTPATIENT
Start: 2018-12-14 | End: 2018-12-20

## 2018-12-14 RX ORDER — IPRATROPIUM BROMIDE AND ALBUTEROL SULFATE 2.5; .5 MG/3ML; MG/3ML
1 SOLUTION RESPIRATORY (INHALATION)
Status: DISCONTINUED | OUTPATIENT
Start: 2018-12-14 | End: 2018-12-14 | Stop reason: HOSPADM

## 2018-12-14 RX ORDER — PREDNISONE 10 MG/1
TABLET ORAL
Qty: 18 TABLET | Refills: 0 | Status: SHIPPED | OUTPATIENT
Start: 2018-12-14 | End: 2019-02-04 | Stop reason: ALTCHOICE

## 2018-12-14 RX ORDER — IPRATROPIUM BROMIDE AND ALBUTEROL SULFATE 2.5; .5 MG/3ML; MG/3ML
1 SOLUTION RESPIRATORY (INHALATION) EVERY 6 HOURS PRN
COMMUNITY
Start: 2018-12-14 | End: 2019-02-04 | Stop reason: SDUPTHER

## 2018-12-14 RX ORDER — ONDANSETRON 4 MG/1
4 TABLET, ORALLY DISINTEGRATING ORAL 3 TIMES DAILY PRN
Qty: 20 TABLET | Refills: 0 | Status: SHIPPED | OUTPATIENT
Start: 2018-12-14 | End: 2019-02-04 | Stop reason: ALTCHOICE

## 2018-12-14 RX ADMIN — DULOXETINE HYDROCHLORIDE 60 MG: 60 CAPSULE, DELAYED RELEASE ORAL at 08:24

## 2018-12-14 RX ADMIN — FUROSEMIDE 40 MG: 40 TABLET ORAL at 08:25

## 2018-12-14 RX ADMIN — CARBIDOPA AND LEVODOPA 1 TABLET: 25; 100 TABLET ORAL at 08:24

## 2018-12-14 RX ADMIN — IPRATROPIUM BROMIDE AND ALBUTEROL SULFATE 1 AMPULE: .5; 3 SOLUTION RESPIRATORY (INHALATION) at 07:25

## 2018-12-14 RX ADMIN — LOSARTAN POTASSIUM 25 MG: 25 TABLET, FILM COATED ORAL at 08:24

## 2018-12-14 RX ADMIN — APIXABAN 2.5 MG: 5 TABLET, FILM COATED ORAL at 08:24

## 2018-12-14 RX ADMIN — ASPIRIN 81 MG: 81 TABLET, COATED ORAL at 08:25

## 2018-12-14 RX ADMIN — IPRATROPIUM BROMIDE AND ALBUTEROL SULFATE 1 AMPULE: .5; 3 SOLUTION RESPIRATORY (INHALATION) at 15:34

## 2018-12-14 RX ADMIN — HYDRALAZINE HYDROCHLORIDE 10 MG: 20 INJECTION INTRAMUSCULAR; INTRAVENOUS at 06:49

## 2018-12-14 RX ADMIN — DOXYCYCLINE HYCLATE 100 MG: 100 TABLET, COATED ORAL at 08:24

## 2018-12-14 RX ADMIN — CARBIDOPA AND LEVODOPA 1 TABLET: 25; 100 TABLET ORAL at 15:15

## 2018-12-14 RX ADMIN — IPRATROPIUM BROMIDE AND ALBUTEROL SULFATE 1 AMPULE: .5; 3 SOLUTION RESPIRATORY (INHALATION) at 11:13

## 2018-12-14 RX ADMIN — PREDNISONE 40 MG: 20 TABLET ORAL at 08:23

## 2018-12-14 RX ADMIN — METOPROLOL TARTRATE 50 MG: 50 TABLET ORAL at 08:25

## 2018-12-14 RX ADMIN — LAMOTRIGINE 50 MG: 100 TABLET ORAL at 08:24

## 2018-12-14 RX ADMIN — Medication 10 ML: at 08:25

## 2018-12-14 RX ADMIN — PROPAFENONE HYDROCHLORIDE 150 MG: 150 TABLET, FILM COATED ORAL at 06:49

## 2018-12-14 RX ADMIN — ACETAMINOPHEN 650 MG: 325 TABLET ORAL at 11:31

## 2018-12-14 RX ADMIN — PROPAFENONE HYDROCHLORIDE 150 MG: 150 TABLET, FILM COATED ORAL at 15:15

## 2018-12-14 ASSESSMENT — PAIN SCALES - GENERAL
PAINLEVEL_OUTOF10: 0
PAINLEVEL_OUTOF10: 2
PAINLEVEL_OUTOF10: 1
PAINLEVEL_OUTOF10: 2

## 2018-12-14 ASSESSMENT — PAIN DESCRIPTION - ORIENTATION: ORIENTATION: MID

## 2018-12-14 ASSESSMENT — PAIN DESCRIPTION - FREQUENCY: FREQUENCY: CONTINUOUS

## 2018-12-14 ASSESSMENT — PAIN DESCRIPTION - PAIN TYPE: TYPE: ACUTE PAIN

## 2018-12-14 ASSESSMENT — PAIN DESCRIPTION - DESCRIPTORS: DESCRIPTORS: HEADACHE

## 2018-12-14 ASSESSMENT — PAIN DESCRIPTION - LOCATION: LOCATION: HEAD

## 2018-12-14 NOTE — PROGRESS NOTES
Occupational Therapy Daily Treatment Note    Unit: PCU    Date:  12/14/2018  Patient Name:    Latasha Bocanegra  Admitting diagnosis:  COPD exacerbation ([facility] [J44.1]  COPD (chronic obstructive pulmonary disease) ([facility] [J44.9]  Admit Date:  12/10/2018  Precautions/Restrictions:  Fall risk, telemetry, continuous pulse ox, hx Parkinsons    Discharge Recommendations: home with 24 hour assist, HHOT  AM-PAC Score: 18  DME needs at discharge: none at this time    Treatment Time:  [id number]   Treatment number: 2    Subjective:  Patient reports headache but agreeable to ADL session this am.    Pain:  \"Like a vice\" headache, RN notified and will bring medication    Bed Mobility: NT-up in chair at beginning and end of session  Supine to Sit:  Sit to Supine:  Rolling:  Scooting:    Transfer Training:   Sit to stand: SBA from chair, MIN A from toilet  Stand to sit: SBA, cues for hand placement at toilet and chair  Chair to Toilet to Chair: CGA with RW, slow movement, cues to keep eyes open    ADL Training: Patient washed hair with shampoo cap with MAX A, washed upper body with SBA for front, MAX A for back, changed gown with MIN A.  MAX A for washing feet and lower legs, DEP to change socks (declined any bending over due to headache), SBA to wash upper legs seated and front periarea seated, MAX A to wash backside standing and MOD A to change disposable undergarment and pants. Completed perihygiene on toilet after urinating with SBA, too fatigued to stand at sink for oral care so returned to chair and completed oral hygiene routine with SETUP assist.  Fatigued after ADLs and required several seated rest breaks during session. Therapeutic Exercise:    Incorporated into ADL session this am.    Patient Education: role of OT, safety when up, d/c recs    Positioning Needs: left reclined in chair with needs in reach and chair alarm on, RN notified of headache pain and fatigue status    Family Present:  present

## 2018-12-14 NOTE — PROGRESS NOTES
Pulmonary Progress Note  CC: Cough and congestion    Subjective:     Feeling better    IV line peripheral    EXAM:   /69   Pulse 66   Temp 97.5 °F (36.4 °C) (Oral)   Resp 16   Ht 5' 6\" (1.676 m)   Wt 235 lb 7.2 oz (106.8 kg)   SpO2 94%   BMI 38.00 kg/m²  on room air  Constitutional:  No acute distress   HEENT: no scleral icterus. Neck: No tracheal deviation present. Cardiovascular: Normal heart sounds. Pulmonary/Chest: Fewer wheezes. No rhonchi. No rales. No decreased breath sounds. No accessory muscle usage or stridor. Abdominal: Soft. Musculoskeletal: No cyanosis. No clubbing. Skin: Skin is warm and dry.      Scheduled Meds:   ipratropium-albuterol  1 ampule Inhalation Q4H WA    predniSONE  40 mg Oral Daily    lamoTRIgine  50 mg Oral BID    losartan  25 mg Oral Daily    metoprolol tartrate  50 mg Oral BID    apixaban  2.5 mg Oral BID    aspirin EC  81 mg Oral Daily    atorvastatin  40 mg Oral Nightly    DULoxetine  60 mg Oral Daily    carbidopa-levodopa  1 tablet Oral TID    furosemide  40 mg Oral Daily    montelukast  10 mg Oral Nightly    propafenone  150 mg Oral 3 times per day    gabapentin  100 mg Oral QPM    sodium chloride flush  10 mL Intravenous 2 times per day    doxycycline hyclate  100 mg Oral 2 times per day    insulin lispro  0-6 Units Subcutaneous TID WC    insulin lispro  0-3 Units Subcutaneous Nightly     Continuous Infusions:   dextrose       PRN Meds:  hydrALAZINE, HYDROcodone-homatropine, magic (miracle) mouthwash with nystatin, sodium chloride flush, prochlorperazine, glucose, dextrose, glucagon (rDNA), dextrose    Labs:  CBC:   Recent Labs      12/13/18   0717  12/14/18   0629   WBC  15.8*  11.9*   HGB  11.1*  11.5*   HCT  34.9*  36.6   MCV  84.1  85.7   PLT  268  247     BMP:   Recent Labs      12/13/18   0526  12/14/18   0516   NA  136  137   K  5.0  3.9   CL  96*  100   CO2  30  28   PHOS  3.8   --    BUN  33*  38*   CREATININE  1.2  1.5* Cultures:  12/10/18 blood coag-negative staph 1 of 2 cultures  18 rapid flu negative  18 sputum normal respiratory medina    IMAGIN/10/18 chest x-ray clear lung fields  18 repeat chest x-ray remains clear     12/10/18 head CT no acute change     ASSESSMENT:  · Acute hypoxemic respiratory failure, not normally on oxygen during the day  · Persistent asthma with acute exacerbation, on budesonide and duonebs at home  · Obesity  · Acute metabolic encephalopathy  · Leukocytosis  · Chronic HFpEF  · BETSY on home BiPAP 22/15, f/b Dalbert Bakes  · RLS  · Suspected early thrush  · No tobacco history     PLAN:  · BiPAP hs - home settings are 22/15  · Supplemental oxygen to maintain SaO2 >92%; wean as tolerated    · Prednisone taper  · Inhaled bronchodilators   · Doxy D6/7  · Home lasix  · Nystatin  · When necessary Hycodan

## 2018-12-14 NOTE — PROGRESS NOTES
greater than 140 beats per minute, pending direction from physician. 3. Bronchodilators will be administered via Metered Dose Inhaler (MDI) with spacer when the following criteria are met:  a. Alert and cooperative     b. HR < 140 bpm  c. RR < 30 bpm                d. Can demonstrate a 2-3 second inspiratory hold  4. Bronchodilators will be administered via Hand Held Nebulizer MATEO [facility]) to patients when ANY of the following criteria are met  a. Incognizant or uncooperative          b. Patients treated with HHN at Home        c. Unable to demonstrate proper use of MDI with spacer     d. RR > 30 bpm   5. Bronchodilators will be delivered via Metered Dose Inhaler (MDI), HHN, Aerogen to intubated patients on mechanical ventilation. 6. Inhalation medication orders will be delivered and/or substituted as outlined below. Aerosolized Medications Ordering and Administration Guidelines:    1. All Medications will be ordered by a physician, and their frequency and/or modality will be adjusted as defined by the patients Respiratory Severity Index (RSI) score. 2. If the patient does not have documented COPD, consider discontinuing anticholinergics when RSI is less than 9.  3. If the bronchospasm worsens (increased RSI), then the bronchodilator frequency can be increased to a maximum of every 4 hours. If greater than every 4 hours is required, the physician will be contacted. 4. If the bronchospasm improves, the frequency of the bronchodilator can be decreased, based on the patient's RSI, but not less than home treatment regimen frequency. 5. Bronchodilator(s) will be discontinued if patient has a RSI less than 9 and has received no scheduled or as needed treatment for 72  Hrs. Patients Ordered on a Mucolytic Agent:    1. Must always be administered with a bronchodilator.     2. Discontinue if patient experiences worsened bronchospasm, or secretions have lessened to the point that the patient is able to clear them with a cough.    Anti-inflammatory and Combination Medications:    1. If the patient lacks prior history of lung disease, is not using inhaled anti-inflammatory medication at home, and lacks wheezing by examination or by history for at least 24 hours, contact physician for possible discontinuation.

## 2018-12-14 NOTE — CARE COORDINATION
INTERDISCIPLINARY PLAN OF CARE CONFERENCE    Date/Time: 12/12/2018 1:34 PM  Completed by: Mai Camilo Case Management      Patient Name:  Juventino Singer  YOB: 1940  Admitting Diagnosis: COPD exacerbation ([facility] [J44.1]  COPD (chronic obstructive pulmonary disease) ([facility] [J44.9]     Admit Date/Time:  12/10/2018  3:04 PM    Chart reviewed. Interdisciplinary team met to discuss patient progress and discharge plans. Disciplines included Case Management, Nursing, and Dietitian. Current Status:stable    PT/OT recommendation:NA    Anticipated Discharge Date: tbd  Expected D/C Disposition:  Home  Confirmed plan with patient and/or family Yes-pt and spouse  Discharge Plan Comments: Chart reviewed and role of dcp explained. Pt is from house with spouse and is planning to return with BOBY with [facility] for SN/PT/OT and would like to add Speech.  +eCOC      Home O2 in place on admit: No  Pt informed of need to bring portable home O2 tank on day of discharge for nursing to connect prior to leaving:  Not Indicated  Verbalized agreement/Understanding:  Not Indicated
[facility]  Spoke with pt and spouse regarding [facility]. Pt is requesting ST to be added to Home Care. Reviewed ST with pt and spouse. Verified demographics.  Faxed referral with orders to [facility] for SN, PT/OT, ST.    Electronically signed by Ammy Xavier RN on 12/14/2018 at 2:18 PM
Recently DC'd PT/OT but would like to resume SN/PT/OT if it is recommended. +eCOC.  +CM following
Yes  How often do you have difficulty taking your medications?:  I always take them as prescribed. Housing Review  Who do you live with?:  Partner/Spouse/SO  Are you an active caregiver in your home?:  No  Social Support  Do you have a ?:  No  Do you have a [facility]?:  Yes  [facility] AT [facility] Name:  [address] [place] [address] St:  [address]  [facility]  Patient DME:  Shower chair, Keny Gan, Wheelchair, Straight cane, Crutches, Other  Other Patient DME:  grab bar in shower and around toilet, long handled sponge  Patient Home Equipment:  Nebulizer, BiPAP, Oxygen  Functional Review  Ability to seek help/take action for Emergent/Urgent situations i.e. fire, crime, inclement weather or health crisis. :  Independent  Ability handle personal hygiene needs (bathing/dressing/grooming): Independent  Ability to manage medications:  Needs Assistance  Ability to prepare food:  Dependent  Ability to maintain home (clean home, laundry):  Dependent  Ability to drive and/or has transportation:  Dependent  Ability to do shopping:  Needs Assistance  Ability to manage finances:  Dependent  Is patient able to live independently?:  No  Hearing and Vision  Visual Impairment:  Blind, Visual impairment (Glasses/contacts) (Comment: blind left eye)  Hearing Impairment:  None  Care Transitions Interventions       Lives at home with spouse who provides assistance, transportation, meals, med management. Meds are filled through [employer] and  doses them to her TID. She states it will not be any issue to identify medications in the pill packs if med are changed or stopped at discharge. States her  has managed this for her before. Active with [facility] and plans return home resuming services. Has scale, BP cuff and glucometer and monitors all daily. Able to state weight gain parameters and when to call physician. She does not actively follow with a cardiologist at this time.  She can afford her Eliquis and

## 2018-12-14 NOTE — PLAN OF CARE
Problem: Risk for Impaired Skin Integrity  Goal: Tissue integrity - skin and mucous membranes  Structural intactness and normal physiological function of skin and  mucous membranes. Outcome: Ongoing      Problem: OXYGENATION/RESPIRATORY FUNCTION  Goal: Patient will achieve/maintain normal respiratory rate/effort  Respiratory rate and effort will be within normal limits for the patient   Outcome: Ongoing  Patient is resting in bed at this time. Currently on BIPAP with no s/s of breathing difficulties.

## 2018-12-14 NOTE — PROGRESS NOTES
Pain medication given for head ache and patient is responding well. Patient eager for discharge.   Bethany Hermosillo RN

## 2018-12-14 NOTE — DISCHARGE SUMMARY
sinus rhythm       Procedures (Please Review Full Report for Details)  None     Consults    Pulmonology       Physical Exam at Discharge:    BP (!) 108/48   Pulse 67   Temp 98.1 °F (36.7 °C) (Oral)   Resp 20   Ht 5' 6\" (1.676 m)   Wt 235 lb 7.2 oz (106.8 kg)   SpO2 92%   BMI 38.00 kg/m²   General:  Awake, alert, No distress   Skin: Warm and dry  Neck: JVD absent. Neck supple  Chest: Diminished breath sounds. No wheezes. mild rhonchi   Cardiovascular:  RRR ,S1S2 normal  Abdomen:  Soft, non tender, non distended, BS +  Extremities:  No edema   Neuro: awake and alert. non focal    CBC:   Recent Labs      12/13/18   0717  12/14/18   0629   WBC  15.8*  11.9*   HGB  11.1*  11.5*   HCT  34.9*  36.6   MCV  84.1  85.7   PLT  268  247     BMP:   Recent Labs      12/13/18   0526  12/14/18   0516   NA  136  137   K  5.0  3.9   CL  96*  100   CO2  30  28   PHOS  3.8   --    BUN  33*  38*   CREATININE  1.2  1.5*       CULTURES  Rapid flu antigen negative   blood cultures 1 out of 2 coag negative staph  Sputum NRG    RADIOLOGY  XR CHEST STANDARD (2 VW)   Final Result   No evidence of acute cardiopulmonary disease. CT Head WO Contrast   Final Result   Mild small vessel ischemic changes      No acute abnormality otherwise         XR CHEST PORTABLE   Final Result   No acute cardiac or pulmonary disease. Discharge Medications     Medication List      START taking these medications    doxycycline hyclate 100 MG tablet  Commonly known as:  VIBRA-TABS  Take 1 tablet by mouth every 12 hours for 6 days  Notes to patient:  Doxycycline (Vibramycin, Doxy, Vibra Tabs)  Use: Treat infection  Side effects: nausea, vomiting, diarrhea & rash     HYDROcodone-homatropine 5-1.5 MG/5ML syrup  Commonly known as:  HYCODAN  Take 5 mLs by mouth every 4 hours as needed (cough) for up to 3 days. Megan Lee      nystatin [id number] UNIT/ML suspension  Commonly known as:  MYCOSTATIN  5 ml Swish and swallow 4 times a day for 5 days capsule  Commonly known as:  NEURONTIN  Take 1 capsule by mouth every evening for 90 days. .     Handicap Placard Misc  by Does not apply route. 12/12/14-12/12/19     lamoTRIgine 25 MG tablet  Commonly known as:  LAMICTAL     Lift Chair Misc  Use daily due to leg weakness. losartan 25 MG tablet  Commonly known as:  COZAAR     metoprolol tartrate 50 MG tablet  Commonly known as:  LOPRESSOR  Take 1 tablet by mouth 2 times daily     montelukast 10 MG tablet  Commonly known as:  SINGULAIR  Take 1 tablet by mouth daily. Nebulizer Compressor Kit  Use Q6 hours as needed with Duoneb.     propafenone 150 MG tablet  Commonly known as:  RYTHMOL  Take 1 tablet by mouth every 8 hours     salmeterol 50 MCG/DOSE diskus inhaler  Commonly known as:  SEREVENT  Inhale 1 puff by mouth 2 times a day. therapeutic multivitamin-minerals tablet           Where to Get Your Medications      These [facility] AT [place] [phone]  [address] [place] [address]    Hours:  24-hours Phone:  [phone]   · ELIQUIS 2.5 MG Tabs tablet     You can get these medications from any pharmacy    Bring a paper prescription for each of these medications  · doxycycline hyclate 100 MG tablet  · HYDROcodone-homatropine 5-1.5 MG/5ML syrup  · nystatin [id number] UNIT/ML suspension  · ondansetron 4 MG disintegrating tablet  · predniSONE 10 MG tablet           Discharged in stable condition to Home     Follow Up:   Follow up with PCP in 1 week        Lenin Tovar MD  12/14/18

## 2018-12-15 ENCOUNTER — CARE COORDINATION (OUTPATIENT)
Dept: CASE MANAGEMENT | Age: 78
End: 2018-12-15

## 2018-12-15 DIAGNOSIS — J44.9 CHRONIC OBSTRUCTIVE PULMONARY DISEASE, UNSPECIFIED COPD TYPE (HCC): Primary | ICD-10-CM

## 2018-12-15 LAB — BLOOD CULTURE, ROUTINE: NORMAL

## 2018-12-15 PROCEDURE — 1111F DSCHRG MED/CURRENT MED MERGE: CPT | Performed by: INTERNAL MEDICINE

## 2018-12-15 NOTE — CARE COORDINATION
[facility] Transitions Initial Follow Up Call    Call within 2 business days of discharge: Yes    Patient: Trenton Gruber Patient : 1940   MRN: [id number]  Reason for Admission: There are no discharge diagnoses documented for the most recent discharge. Discharge Date: 18 RARS: Readmission Risk Score: 27     Spoke with: [address]: Phyllis Mehta    Non-face-to-face services provided:  Obtained and reviewed discharge summary and/or continuity of care documents  Assessment and support for treatment adherence and medication management-completed 1111f    Care Transitions 24 Hour Call    Do you have any ongoing symptoms?:  Yes  Patient-reported symptoms:  Cough  Interventions for patient-reported symptoms:  Other  Do you have a copy of your discharge instructions?:  Yes  Do you have all of your prescriptions and are they filled?:  Yes  Have you been contacted by a [address]?:  No  Have you scheduled your follow up appointment?:  Yes  How are you going to get to your appointment?:  Car - family or friend to transport  Were you discharged with any Home Care or Post Acute Services:  No  Patient DME:  Shower chair, Johnella Fill, Wheelchair, Straight cane, Crutches, Other  Other Patient DME:  grab bar in shower and around toilet, long handled sponge  Patient Home Equipment:  Nebulizer, BiPAP, Oxygen  Do you have support at home?:  Partner/Spouse/SO  Do you feel like you have everything you need to keep you well at home?:  Yes  Are you an active caregiver in your home?:  No  Care Transitions Interventions       CTC spoke to pt's  for initial transitions call. Pt is continuing to cough but not bringing up much. She has new medication Hycodan for cough and is taking q 4 hrs. Reviewed medications 1111f. Pt has all medications and taking as directed. Has not needed Zofran yet.  stated pt remains SOB with exertion, is using nebulizer treatments q 4 hrs.  Pt has f/u appt on

## 2018-12-18 ENCOUNTER — CARE COORDINATION (OUTPATIENT)
Dept: CASE MANAGEMENT | Age: 78
End: 2018-12-18

## 2018-12-18 ENCOUNTER — TELEPHONE (OUTPATIENT)
Dept: FAMILY MEDICINE CLINIC | Age: 78
End: 2018-12-18

## 2018-12-18 NOTE — CARE COORDINATION
[facility] Transitions Follow Up Call    2018    Patient: Merary Cadena  Patient : 1940   MRN: [id number]  Reason for Admission: COPD  Discharge Date: 18 RARS: Readmission Risk Score: 32       Spoke with: patient Arsen [facility] Transitions Subsequent and Final Call    Subsequent and Final Calls  Do you have any ongoing symptoms?:  Yes  Patient-reported symptoms:  Cough  Have your medications changed?:  No  Do you have any questions related to your medications?:  No  Do you currently have any active services?:  Yes  Are you currently active with any services?:  Home Health  Do you have any needs or concerns that I can assist you with?:  No  Identified Barriers:  None  Care Transitions Interventions  No Identified Needs  Other Interventions:        Patient states she's feeling great. States nurse from [facility] was out today and \"checked her over\" and said she was doing good. Said she still has a cough but it's not occurring as often, does have clear to yellow sputum. Does have SOB w/exertion which is baseline. Using Bipap at noc, and taking neb tx's q4hrs which she says helps a lot. Denies fever. In very good spirits. Aware of PCP appt on . Denies any needs at present time and agreeable to f/u calls. Gave reminder that if they have any questions/concerns at anytime, they can always call PCP/specialist as they always have an MD on call . Also advised that they can always contact their home care provider to request a nurse visit even if it isn't their regularly scheduled day for their nurse to visit.        Follow Up  Future Appointments  Date Time Provider Katy Booker   2018 8:00 AM [facility]   2019 10:15 AM [facility]   2019 10:30 AM MAHAD Hernandez - CNP [facility]       Pita Gleason RN

## 2018-12-20 ENCOUNTER — OFFICE VISIT (OUTPATIENT)
Dept: FAMILY MEDICINE CLINIC | Age: 78
End: 2018-12-20
Payer: MEDICARE

## 2018-12-20 VITALS
WEIGHT: 241.6 LBS | HEIGHT: 66 IN | OXYGEN SATURATION: 95 % | SYSTOLIC BLOOD PRESSURE: 124 MMHG | DIASTOLIC BLOOD PRESSURE: 78 MMHG | BODY MASS INDEX: 38.83 KG/M2 | HEART RATE: 63 BPM

## 2018-12-20 DIAGNOSIS — J96.22 ACUTE ON CHRONIC RESPIRATORY FAILURE WITH HYPOXIA AND HYPERCAPNIA (HCC): Primary | ICD-10-CM

## 2018-12-20 DIAGNOSIS — G93.41 ACUTE METABOLIC ENCEPHALOPATHY: ICD-10-CM

## 2018-12-20 DIAGNOSIS — J96.21 ACUTE ON CHRONIC RESPIRATORY FAILURE WITH HYPOXIA AND HYPERCAPNIA (HCC): Primary | ICD-10-CM

## 2018-12-20 DIAGNOSIS — I50.31 ACUTE DIASTOLIC CONGESTIVE HEART FAILURE (HCC): Chronic | ICD-10-CM

## 2018-12-20 DIAGNOSIS — J44.1 COPD EXACERBATION (HCC): ICD-10-CM

## 2018-12-20 LAB
ALBUMIN SERPL-MCNC: 4 G/DL (ref 3.4–5)
ANION GAP SERPL CALCULATED.3IONS-SCNC: 13 MMOL/L (ref 3–16)
BASOPHILS ABSOLUTE: 0 K/UL (ref 0–0.2)
BASOPHILS RELATIVE PERCENT: 0.2 %
BUN BLDV-MCNC: 36 MG/DL (ref 7–20)
CALCIUM SERPL-MCNC: 9.1 MG/DL (ref 8.3–10.6)
CHLORIDE BLD-SCNC: 105 MMOL/L (ref 99–110)
CO2: 28 MMOL/L (ref 21–32)
CREAT SERPL-MCNC: 1.3 MG/DL (ref 0.6–1.2)
EOSINOPHILS ABSOLUTE: 0 K/UL (ref 0–0.6)
EOSINOPHILS RELATIVE PERCENT: 0.1 %
GFR AFRICAN AMERICAN: 48
GFR NON-AFRICAN AMERICAN: 40
GLUCOSE BLD-MCNC: 132 MG/DL (ref 70–99)
HCT VFR BLD CALC: 37.9 % (ref 36–48)
HEMOGLOBIN: 11.9 G/DL (ref 12–16)
LYMPHOCYTES ABSOLUTE: 1 K/UL (ref 1–5.1)
LYMPHOCYTES RELATIVE PERCENT: 5.8 %
MCH RBC QN AUTO: 26.7 PG (ref 26–34)
MCHC RBC AUTO-ENTMCNC: 31.4 G/DL (ref 31–36)
MCV RBC AUTO: 85.2 FL (ref 80–100)
MONOCYTES ABSOLUTE: 0.3 K/UL (ref 0–1.3)
MONOCYTES RELATIVE PERCENT: 1.9 %
NEUTROPHILS ABSOLUTE: 16.6 K/UL (ref 1.7–7.7)
NEUTROPHILS RELATIVE PERCENT: 92 %
PDW BLD-RTO: 15 % (ref 12.4–15.4)
PHOSPHORUS: 3.4 MG/DL (ref 2.5–4.9)
PLATELET # BLD: 300 K/UL (ref 135–450)
PMV BLD AUTO: 9.2 FL (ref 5–10.5)
POTASSIUM SERPL-SCNC: 4.5 MMOL/L (ref 3.5–5.1)
RBC # BLD: 4.45 M/UL (ref 4–5.2)
SODIUM BLD-SCNC: 146 MMOL/L (ref 136–145)
WBC # BLD: 18 K/UL (ref 4–11)

## 2018-12-20 PROCEDURE — 1111F DSCHRG MED/CURRENT MED MERGE: CPT | Performed by: PHYSICIAN ASSISTANT

## 2018-12-20 PROCEDURE — 99496 TRANSJ CARE MGMT HIGH F2F 7D: CPT | Performed by: PHYSICIAN ASSISTANT

## 2018-12-20 ASSESSMENT — ENCOUNTER SYMPTOMS
SORE THROAT: 0
RHINORRHEA: 0
NAUSEA: 0
CONSTIPATION: 0
COUGH: 1
ABDOMINAL PAIN: 0
DIARRHEA: 0
VOMITING: 0
SHORTNESS OF BREATH: 0

## 2018-12-20 NOTE — PROGRESS NOTES
and hypercapnia thought to be 2/2 to asthma/copd exacerbation. Also found to have elevated WBC up to 20, attributed to respiratory origin although cxr clear. Pt symptoms improved with prednisone and doxycycline. Also found to have increased BNP of 1177. Interval history/Current status:    Patient states that she feels improved since discharge but not back to baseline. States that she still has non productive cough. Denies shortness of breath. Satting well on RA today. Denies ANN, LE swelling. Also denies fevers, chills, nausea and vomiting. Review of Systems   Constitutional: Positive for fatigue. Negative for activity change, chills and fever. HENT: Negative for congestion, ear pain, rhinorrhea and sore throat. Eyes: Negative for visual disturbance. Respiratory: Positive for cough. Negative for shortness of breath. Cardiovascular: Negative for chest pain and palpitations. Gastrointestinal: Negative for abdominal pain, constipation, diarrhea, nausea and vomiting. Genitourinary: Negative for difficulty urinating and dysuria. Musculoskeletal: Negative for arthralgias and myalgias. Skin: Negative for rash. Neurological: Negative for dizziness, weakness and numbness. Psychiatric/Behavioral: Negative for sleep disturbance. Vitals:    12/20/18 0823   BP: 124/78   Site: Right Upper Arm   Position: Sitting   Cuff Size: Large Adult   Pulse: 63   SpO2: 95%   Weight: 241 lb 9.6 oz (109.6 kg)   Height: 5' 6\" (1.676 m)     Body mass index is 39 kg/m². Wt Readings from Last 3 Encounters:   12/20/18 241 lb 9.6 oz (109.6 kg)   12/14/18 235 lb 7.2 oz (106.8 kg)   11/20/18 248 lb 3.2 oz (112.6 kg)     BP Readings from Last 3 Encounters:   12/20/18 124/78   12/14/18 (!) 108/48   11/20/18 134/78       Physical Exam   Constitutional: She is oriented to person, place, and time. She appears well-developed and well-nourished. No distress. HENT:   Head: Normocephalic and atraumatic.    Eyes: Pupils are

## 2018-12-21 ENCOUNTER — TELEPHONE (OUTPATIENT)
Dept: FAMILY MEDICINE CLINIC | Age: 78
End: 2018-12-21

## 2018-12-21 DIAGNOSIS — D72.829 LEUKOCYTOSIS, UNSPECIFIED TYPE: Primary | ICD-10-CM

## 2018-12-24 ENCOUNTER — TELEPHONE (OUTPATIENT)
Dept: FAMILY MEDICINE CLINIC | Age: 78
End: 2018-12-24

## 2018-12-24 ENCOUNTER — CARE COORDINATION (OUTPATIENT)
Dept: CASE MANAGEMENT | Age: 78
End: 2018-12-24

## 2018-12-24 DIAGNOSIS — D72.829 LEUKOCYTOSIS, UNSPECIFIED TYPE: ICD-10-CM

## 2018-12-24 LAB
BASOPHILS ABSOLUTE: 0.1 K/UL (ref 0–0.2)
BASOPHILS RELATIVE PERCENT: 0.5 %
EOSINOPHILS ABSOLUTE: 0.1 K/UL (ref 0–0.6)
EOSINOPHILS RELATIVE PERCENT: 0.6 %
HCT VFR BLD CALC: 34.6 % (ref 36–48)
HEMOGLOBIN: 10.8 G/DL (ref 12–16)
LYMPHOCYTES ABSOLUTE: 1.8 K/UL (ref 1–5.1)
LYMPHOCYTES RELATIVE PERCENT: 12.3 %
MCH RBC QN AUTO: 26.3 PG (ref 26–34)
MCHC RBC AUTO-ENTMCNC: 31.3 G/DL (ref 31–36)
MCV RBC AUTO: 84.1 FL (ref 80–100)
MONOCYTES ABSOLUTE: 0.8 K/UL (ref 0–1.3)
MONOCYTES RELATIVE PERCENT: 5.3 %
NEUTROPHILS ABSOLUTE: 12 K/UL (ref 1.7–7.7)
NEUTROPHILS RELATIVE PERCENT: 81.3 %
PDW BLD-RTO: 14.8 % (ref 12.4–15.4)
PLATELET # BLD: 209 K/UL (ref 135–450)
PMV BLD AUTO: 9.6 FL (ref 5–10.5)
RBC # BLD: 4.11 M/UL (ref 4–5.2)
WBC # BLD: 14.8 K/UL (ref 4–11)

## 2018-12-27 ENCOUNTER — TELEPHONE (OUTPATIENT)
Dept: FAMILY MEDICINE CLINIC | Age: 78
End: 2018-12-27

## 2018-12-28 RX ORDER — CEFUROXIME AXETIL 500 MG/1
500 TABLET ORAL 2 TIMES DAILY
Qty: 20 TABLET | Refills: 0 | Status: SHIPPED | OUTPATIENT
Start: 2018-12-28 | End: 2019-01-07

## 2018-12-31 ENCOUNTER — CARE COORDINATION (OUTPATIENT)
Dept: CARE COORDINATION | Age: 78
End: 2018-12-31

## 2019-01-02 ENCOUNTER — CARE COORDINATION (OUTPATIENT)
Dept: CARE COORDINATION | Age: 79
End: 2019-01-02

## 2019-01-02 ASSESSMENT — ENCOUNTER SYMPTOMS: DYSPNEA ASSOCIATED WITH: MINIMAL EXERTION

## 2019-01-03 ENCOUNTER — TELEPHONE (OUTPATIENT)
Dept: FAMILY MEDICINE CLINIC | Age: 79
End: 2019-01-03

## 2019-01-04 ENCOUNTER — TELEPHONE (OUTPATIENT)
Dept: FAMILY MEDICINE CLINIC | Age: 79
End: 2019-01-04

## 2019-01-04 RX ORDER — BENZONATATE 200 MG/1
200 CAPSULE ORAL 3 TIMES DAILY PRN
Qty: 30 CAPSULE | Refills: 0 | Status: SHIPPED | OUTPATIENT
Start: 2019-01-04 | End: 2019-01-11

## 2019-01-09 ENCOUNTER — TELEPHONE (OUTPATIENT)
Dept: FAMILY MEDICINE CLINIC | Age: 79
End: 2019-01-09

## 2019-01-09 RX ORDER — PROPAFENONE HYDROCHLORIDE 150 MG/1
150 TABLET, FILM COATED ORAL EVERY 8 HOURS SCHEDULED
Qty: 90 TABLET | Refills: 3 | OUTPATIENT
Start: 2019-01-09

## 2019-01-09 RX ORDER — LOSARTAN POTASSIUM 50 MG/1
50 TABLET ORAL DAILY
Qty: 30 TABLET | Refills: 0 | Status: SHIPPED | OUTPATIENT
Start: 2019-01-09 | End: 2019-01-10 | Stop reason: SDUPTHER

## 2019-01-10 ENCOUNTER — CARE COORDINATION (OUTPATIENT)
Dept: CARE COORDINATION | Age: 79
End: 2019-01-10

## 2019-01-10 ENCOUNTER — TELEPHONE (OUTPATIENT)
Dept: FAMILY MEDICINE CLINIC | Age: 79
End: 2019-01-10

## 2019-01-10 RX ORDER — LOSARTAN POTASSIUM 50 MG/1
50 TABLET ORAL DAILY
Qty: 90 TABLET | Refills: 1 | Status: SHIPPED | OUTPATIENT
Start: 2019-01-10 | End: 2019-04-29 | Stop reason: SDUPTHER

## 2019-01-10 RX ORDER — PROPAFENONE HYDROCHLORIDE 150 MG/1
150 TABLET, FILM COATED ORAL EVERY 8 HOURS SCHEDULED
Qty: 90 TABLET | Refills: 3 | Status: SHIPPED | OUTPATIENT
Start: 2019-01-10 | End: 2019-05-06 | Stop reason: SDUPTHER

## 2019-01-17 ENCOUNTER — OFFICE VISIT (OUTPATIENT)
Dept: CARDIOLOGY CLINIC | Age: 79
End: 2019-01-17
Payer: MEDICARE

## 2019-01-17 VITALS
BODY MASS INDEX: 39.98 KG/M2 | SYSTOLIC BLOOD PRESSURE: 158 MMHG | HEIGHT: 66 IN | WEIGHT: 248.8 LBS | HEART RATE: 62 BPM | OXYGEN SATURATION: 94 % | DIASTOLIC BLOOD PRESSURE: 58 MMHG

## 2019-01-17 DIAGNOSIS — G47.33 OSA TREATED WITH BIPAP: ICD-10-CM

## 2019-01-17 DIAGNOSIS — N18.2 CKD (CHRONIC KIDNEY DISEASE) STAGE 2, GFR 60-89 ML/MIN: ICD-10-CM

## 2019-01-17 DIAGNOSIS — I50.32 CHRONIC DIASTOLIC CONGESTIVE HEART FAILURE (HCC): ICD-10-CM

## 2019-01-17 DIAGNOSIS — N18.30 TYPE 2 DIABETES MELLITUS WITH STAGE 3 CHRONIC KIDNEY DISEASE, WITHOUT LONG-TERM CURRENT USE OF INSULIN (HCC): ICD-10-CM

## 2019-01-17 DIAGNOSIS — E11.22 TYPE 2 DIABETES MELLITUS WITH STAGE 3 CHRONIC KIDNEY DISEASE, WITHOUT LONG-TERM CURRENT USE OF INSULIN (HCC): ICD-10-CM

## 2019-01-17 DIAGNOSIS — E78.2 MIXED HYPERLIPIDEMIA: ICD-10-CM

## 2019-01-17 DIAGNOSIS — I10 ESSENTIAL HYPERTENSION: ICD-10-CM

## 2019-01-17 DIAGNOSIS — J44.9 CHRONIC OBSTRUCTIVE PULMONARY DISEASE, UNSPECIFIED COPD TYPE (HCC): ICD-10-CM

## 2019-01-17 DIAGNOSIS — I48.0 PAF (PAROXYSMAL ATRIAL FIBRILLATION) (HCC): Primary | ICD-10-CM

## 2019-01-17 PROCEDURE — 1036F TOBACCO NON-USER: CPT | Performed by: NURSE PRACTITIONER

## 2019-01-17 PROCEDURE — 99213 OFFICE O/P EST LOW 20 MIN: CPT | Performed by: NURSE PRACTITIONER

## 2019-01-17 PROCEDURE — G8400 PT W/DXA NO RESULTS DOC: HCPCS | Performed by: NURSE PRACTITIONER

## 2019-01-17 PROCEDURE — G8417 CALC BMI ABV UP PARAM F/U: HCPCS | Performed by: NURSE PRACTITIONER

## 2019-01-17 PROCEDURE — G8598 ASA/ANTIPLAT THER USED: HCPCS | Performed by: NURSE PRACTITIONER

## 2019-01-17 PROCEDURE — 1101F PT FALLS ASSESS-DOCD LE1/YR: CPT | Performed by: NURSE PRACTITIONER

## 2019-01-17 PROCEDURE — G8482 FLU IMMUNIZE ORDER/ADMIN: HCPCS | Performed by: NURSE PRACTITIONER

## 2019-01-17 PROCEDURE — 1123F ACP DISCUSS/DSCN MKR DOCD: CPT | Performed by: NURSE PRACTITIONER

## 2019-01-17 PROCEDURE — G8427 DOCREV CUR MEDS BY ELIG CLIN: HCPCS | Performed by: NURSE PRACTITIONER

## 2019-01-17 PROCEDURE — G8926 SPIRO NO PERF OR DOC: HCPCS | Performed by: NURSE PRACTITIONER

## 2019-01-17 PROCEDURE — 1090F PRES/ABSN URINE INCON ASSESS: CPT | Performed by: NURSE PRACTITIONER

## 2019-01-17 PROCEDURE — 3023F SPIROM DOC REV: CPT | Performed by: NURSE PRACTITIONER

## 2019-01-17 PROCEDURE — 4040F PNEUMOC VAC/ADMIN/RCVD: CPT | Performed by: NURSE PRACTITIONER

## 2019-01-17 ASSESSMENT — ENCOUNTER SYMPTOMS
COUGH: 1
SHORTNESS OF BREATH: 1
RHINORRHEA: 1

## 2019-01-24 ENCOUNTER — CARE COORDINATION (OUTPATIENT)
Dept: CARE COORDINATION | Age: 79
End: 2019-01-24

## 2019-01-24 ASSESSMENT — ENCOUNTER SYMPTOMS: DYSPNEA ASSOCIATED WITH: EXERTION

## 2019-02-04 ENCOUNTER — CARE COORDINATION (OUTPATIENT)
Dept: CARE COORDINATION | Age: 79
End: 2019-02-04

## 2019-02-04 ENCOUNTER — OFFICE VISIT (OUTPATIENT)
Dept: FAMILY MEDICINE CLINIC | Age: 79
End: 2019-02-04
Payer: MEDICARE

## 2019-02-04 VITALS
DIASTOLIC BLOOD PRESSURE: 60 MMHG | BODY MASS INDEX: 40.98 KG/M2 | WEIGHT: 255 LBS | HEIGHT: 66 IN | SYSTOLIC BLOOD PRESSURE: 152 MMHG | OXYGEN SATURATION: 97 % | HEART RATE: 62 BPM

## 2019-02-04 DIAGNOSIS — G20 PARKINSON'S DISEASE (HCC): ICD-10-CM

## 2019-02-04 DIAGNOSIS — J44.9 CHRONIC OBSTRUCTIVE PULMONARY DISEASE, UNSPECIFIED COPD TYPE (HCC): ICD-10-CM

## 2019-02-04 DIAGNOSIS — I48.0 PAF (PAROXYSMAL ATRIAL FIBRILLATION) (HCC): ICD-10-CM

## 2019-02-04 DIAGNOSIS — J45.40 MODERATE PERSISTENT ASTHMA WITHOUT COMPLICATION: ICD-10-CM

## 2019-02-04 DIAGNOSIS — N18.30 TYPE 2 DIABETES MELLITUS WITH STAGE 3 CHRONIC KIDNEY DISEASE, WITHOUT LONG-TERM CURRENT USE OF INSULIN (HCC): Primary | Chronic | ICD-10-CM

## 2019-02-04 DIAGNOSIS — G47.33 OSA TREATED WITH BIPAP: ICD-10-CM

## 2019-02-04 DIAGNOSIS — G40.209 PARTIAL SYMPTOMATIC EPILEPSY WITH COMPLEX PARTIAL SEIZURES, NOT INTRACTABLE, WITHOUT STATUS EPILEPTICUS (HCC): ICD-10-CM

## 2019-02-04 DIAGNOSIS — Z91.81 AT HIGH RISK FOR FALLS: ICD-10-CM

## 2019-02-04 DIAGNOSIS — E66.01 MORBID OBESITY WITH BMI OF 40.0-44.9, ADULT (HCC): ICD-10-CM

## 2019-02-04 DIAGNOSIS — I50.32 CHRONIC DIASTOLIC CONGESTIVE HEART FAILURE (HCC): ICD-10-CM

## 2019-02-04 DIAGNOSIS — E11.22 TYPE 2 DIABETES MELLITUS WITH STAGE 3 CHRONIC KIDNEY DISEASE, WITHOUT LONG-TERM CURRENT USE OF INSULIN (HCC): Primary | Chronic | ICD-10-CM

## 2019-02-04 PROBLEM — L89.152 DECUBITUS ULCER OF SACRAL REGION, STAGE 2 (HCC): Chronic | Status: RESOLVED | Noted: 2018-04-13 | Resolved: 2019-02-04

## 2019-02-04 PROBLEM — A41.9 SEPSIS (HCC): Status: RESOLVED | Noted: 2018-09-08 | Resolved: 2019-02-04

## 2019-02-04 PROBLEM — J96.01 ACUTE HYPOXEMIC RESPIRATORY FAILURE (HCC): Status: RESOLVED | Noted: 2018-09-08 | Resolved: 2019-02-04

## 2019-02-04 PROBLEM — I50.31 ACUTE DIASTOLIC CONGESTIVE HEART FAILURE (HCC): Chronic | Status: RESOLVED | Noted: 2018-09-08 | Resolved: 2019-02-04

## 2019-02-04 PROBLEM — J44.1 COPD EXACERBATION (HCC): Status: RESOLVED | Noted: 2018-12-10 | Resolved: 2019-02-04

## 2019-02-04 PROBLEM — R07.89 CHEST TIGHTNESS: Status: RESOLVED | Noted: 2018-09-08 | Resolved: 2019-02-04

## 2019-02-04 PROBLEM — I50.43 CHF (CONGESTIVE HEART FAILURE), NYHA CLASS I, ACUTE ON CHRONIC, COMBINED (HCC): Status: RESOLVED | Noted: 2018-09-26 | Resolved: 2019-02-04

## 2019-02-04 PROBLEM — R79.89 ELEVATED BRAIN NATRIURETIC PEPTIDE (BNP) LEVEL: Status: RESOLVED | Noted: 2018-09-08 | Resolved: 2019-02-04

## 2019-02-04 PROBLEM — R79.89 ABNORMAL BUN-TO-CREATININE RATIO: Status: RESOLVED | Noted: 2018-09-08 | Resolved: 2019-02-04

## 2019-02-04 PROBLEM — J18.9 COMMUNITY ACQUIRED PNEUMONIA OF LEFT LOWER LOBE OF LUNG: Status: RESOLVED | Noted: 2018-09-08 | Resolved: 2019-02-04

## 2019-02-04 LAB
A/G RATIO: 1.8 (ref 1.1–2.2)
ALBUMIN SERPL-MCNC: 4.2 G/DL (ref 3.4–5)
ALP BLD-CCNC: 120 U/L (ref 40–129)
ALT SERPL-CCNC: 9 U/L (ref 10–40)
ANION GAP SERPL CALCULATED.3IONS-SCNC: 17 MMOL/L (ref 3–16)
AST SERPL-CCNC: 14 U/L (ref 15–37)
BILIRUB SERPL-MCNC: 0.3 MG/DL (ref 0–1)
BUN BLDV-MCNC: 44 MG/DL (ref 7–20)
CALCIUM SERPL-MCNC: 9.5 MG/DL (ref 8.3–10.6)
CHLORIDE BLD-SCNC: 99 MMOL/L (ref 99–110)
CHOLESTEROL, TOTAL: 153 MG/DL (ref 0–199)
CO2: 28 MMOL/L (ref 21–32)
CREAT SERPL-MCNC: 1.5 MG/DL (ref 0.6–1.2)
FOLATE: >20 NG/ML (ref 4.78–24.2)
GFR AFRICAN AMERICAN: 41
GFR NON-AFRICAN AMERICAN: 34
GLOBULIN: 2.4 G/DL
GLUCOSE BLD-MCNC: 98 MG/DL (ref 70–99)
HBA1C MFR BLD: 6.6 %
HDLC SERPL-MCNC: 66 MG/DL (ref 40–60)
LDL CHOLESTEROL CALCULATED: 66 MG/DL
POTASSIUM SERPL-SCNC: 4.7 MMOL/L (ref 3.5–5.1)
SODIUM BLD-SCNC: 144 MMOL/L (ref 136–145)
TOTAL PROTEIN: 6.6 G/DL (ref 6.4–8.2)
TRIGL SERPL-MCNC: 106 MG/DL (ref 0–150)
TSH REFLEX: 2.3 UIU/ML (ref 0.27–4.2)
VITAMIN B-12: 1404 PG/ML (ref 211–911)
VLDLC SERPL CALC-MCNC: 21 MG/DL

## 2019-02-04 PROCEDURE — G8598 ASA/ANTIPLAT THER USED: HCPCS | Performed by: INTERNAL MEDICINE

## 2019-02-04 PROCEDURE — 1101F PT FALLS ASSESS-DOCD LE1/YR: CPT | Performed by: INTERNAL MEDICINE

## 2019-02-04 PROCEDURE — G8482 FLU IMMUNIZE ORDER/ADMIN: HCPCS | Performed by: INTERNAL MEDICINE

## 2019-02-04 PROCEDURE — G8400 PT W/DXA NO RESULTS DOC: HCPCS | Performed by: INTERNAL MEDICINE

## 2019-02-04 PROCEDURE — 1036F TOBACCO NON-USER: CPT | Performed by: INTERNAL MEDICINE

## 2019-02-04 PROCEDURE — 3023F SPIROM DOC REV: CPT | Performed by: INTERNAL MEDICINE

## 2019-02-04 PROCEDURE — 99214 OFFICE O/P EST MOD 30 MIN: CPT | Performed by: INTERNAL MEDICINE

## 2019-02-04 PROCEDURE — G8427 DOCREV CUR MEDS BY ELIG CLIN: HCPCS | Performed by: INTERNAL MEDICINE

## 2019-02-04 PROCEDURE — 4040F PNEUMOC VAC/ADMIN/RCVD: CPT | Performed by: INTERNAL MEDICINE

## 2019-02-04 PROCEDURE — 1123F ACP DISCUSS/DSCN MKR DOCD: CPT | Performed by: INTERNAL MEDICINE

## 2019-02-04 PROCEDURE — 83036 HEMOGLOBIN GLYCOSYLATED A1C: CPT | Performed by: INTERNAL MEDICINE

## 2019-02-04 PROCEDURE — 1090F PRES/ABSN URINE INCON ASSESS: CPT | Performed by: INTERNAL MEDICINE

## 2019-02-04 PROCEDURE — G8417 CALC BMI ABV UP PARAM F/U: HCPCS | Performed by: INTERNAL MEDICINE

## 2019-02-04 PROCEDURE — G8926 SPIRO NO PERF OR DOC: HCPCS | Performed by: INTERNAL MEDICINE

## 2019-02-06 ENCOUNTER — TELEPHONE (OUTPATIENT)
Dept: FAMILY MEDICINE CLINIC | Age: 79
End: 2019-02-06

## 2019-02-06 LAB
LAMOTRIGINE LEVEL: 2 UG/ML (ref 2.5–15)
TOPIRAMATE LEVEL: <1.5 UG/ML (ref 5–20)

## 2019-02-06 RX ORDER — IPRATROPIUM BROMIDE AND ALBUTEROL SULFATE 2.5; .5 MG/3ML; MG/3ML
1 SOLUTION RESPIRATORY (INHALATION) EVERY 6 HOURS PRN
Qty: 360 ML | Refills: 1 | Status: SHIPPED | OUTPATIENT
Start: 2019-02-06

## 2019-03-06 DIAGNOSIS — G95.20 CERVICAL SPINAL CORD COMPRESSION (HCC): Chronic | ICD-10-CM

## 2019-03-06 RX ORDER — GABAPENTIN 100 MG/1
100 CAPSULE ORAL EVERY EVENING
Qty: 90 CAPSULE | Refills: 0 | OUTPATIENT
Start: 2019-03-06 | End: 2019-06-04

## 2019-03-12 ENCOUNTER — PROCEDURE VISIT (OUTPATIENT)
Dept: SURGERY | Age: 79
End: 2019-03-12
Payer: MEDICARE

## 2019-03-12 VITALS — HEART RATE: 61 BPM | OXYGEN SATURATION: 96 % | WEIGHT: 247 LBS | BODY MASS INDEX: 39.87 KG/M2 | TEMPERATURE: 97.7 F

## 2019-03-12 DIAGNOSIS — C44.319 BASAL CELL CARCINOMA OF LEFT CHEEK: Primary | ICD-10-CM

## 2019-03-12 PROCEDURE — 17311 MOHS 1 STAGE H/N/HF/G: CPT | Performed by: DERMATOLOGY

## 2019-03-12 PROCEDURE — 12052 INTMD RPR FACE/MM 2.6-5.0 CM: CPT | Performed by: DERMATOLOGY

## 2019-03-13 ENCOUNTER — TELEPHONE (OUTPATIENT)
Dept: SURGERY | Age: 79
End: 2019-03-13

## 2019-03-14 ENCOUNTER — CARE COORDINATION (OUTPATIENT)
Dept: CARE COORDINATION | Age: 79
End: 2019-03-14

## 2019-03-15 ENCOUNTER — CARE COORDINATION (OUTPATIENT)
Dept: CARE COORDINATION | Age: 79
End: 2019-03-15

## 2019-03-25 ENCOUNTER — OFFICE VISIT (OUTPATIENT)
Dept: CARDIOLOGY CLINIC | Age: 79
End: 2019-03-25
Payer: MEDICARE

## 2019-03-25 VITALS
OXYGEN SATURATION: 93 % | BODY MASS INDEX: 40.34 KG/M2 | WEIGHT: 251 LBS | DIASTOLIC BLOOD PRESSURE: 76 MMHG | SYSTOLIC BLOOD PRESSURE: 142 MMHG | HEIGHT: 66 IN | HEART RATE: 55 BPM

## 2019-03-25 DIAGNOSIS — N18.2 CKD (CHRONIC KIDNEY DISEASE) STAGE 2, GFR 60-89 ML/MIN: ICD-10-CM

## 2019-03-25 DIAGNOSIS — E11.22 TYPE 2 DIABETES MELLITUS WITH STAGE 3 CHRONIC KIDNEY DISEASE, WITHOUT LONG-TERM CURRENT USE OF INSULIN (HCC): ICD-10-CM

## 2019-03-25 DIAGNOSIS — I50.32 CHRONIC DIASTOLIC CONGESTIVE HEART FAILURE (HCC): ICD-10-CM

## 2019-03-25 DIAGNOSIS — E78.2 MIXED HYPERLIPIDEMIA: ICD-10-CM

## 2019-03-25 DIAGNOSIS — G47.33 OSA TREATED WITH BIPAP: ICD-10-CM

## 2019-03-25 DIAGNOSIS — I48.0 PAF (PAROXYSMAL ATRIAL FIBRILLATION) (HCC): Primary | ICD-10-CM

## 2019-03-25 DIAGNOSIS — N18.30 TYPE 2 DIABETES MELLITUS WITH STAGE 3 CHRONIC KIDNEY DISEASE, WITHOUT LONG-TERM CURRENT USE OF INSULIN (HCC): ICD-10-CM

## 2019-03-25 DIAGNOSIS — J44.9 CHRONIC OBSTRUCTIVE PULMONARY DISEASE, UNSPECIFIED COPD TYPE (HCC): ICD-10-CM

## 2019-03-25 DIAGNOSIS — I10 ESSENTIAL HYPERTENSION: ICD-10-CM

## 2019-03-25 PROCEDURE — 4040F PNEUMOC VAC/ADMIN/RCVD: CPT | Performed by: NURSE PRACTITIONER

## 2019-03-25 PROCEDURE — G8400 PT W/DXA NO RESULTS DOC: HCPCS | Performed by: NURSE PRACTITIONER

## 2019-03-25 PROCEDURE — G8926 SPIRO NO PERF OR DOC: HCPCS | Performed by: NURSE PRACTITIONER

## 2019-03-25 PROCEDURE — 1090F PRES/ABSN URINE INCON ASSESS: CPT | Performed by: NURSE PRACTITIONER

## 2019-03-25 PROCEDURE — 1036F TOBACCO NON-USER: CPT | Performed by: NURSE PRACTITIONER

## 2019-03-25 PROCEDURE — 3023F SPIROM DOC REV: CPT | Performed by: NURSE PRACTITIONER

## 2019-03-25 PROCEDURE — 99213 OFFICE O/P EST LOW 20 MIN: CPT | Performed by: NURSE PRACTITIONER

## 2019-03-25 PROCEDURE — G8598 ASA/ANTIPLAT THER USED: HCPCS | Performed by: NURSE PRACTITIONER

## 2019-03-25 PROCEDURE — G8482 FLU IMMUNIZE ORDER/ADMIN: HCPCS | Performed by: NURSE PRACTITIONER

## 2019-03-25 PROCEDURE — 1123F ACP DISCUSS/DSCN MKR DOCD: CPT | Performed by: NURSE PRACTITIONER

## 2019-03-25 PROCEDURE — 1101F PT FALLS ASSESS-DOCD LE1/YR: CPT | Performed by: NURSE PRACTITIONER

## 2019-03-25 PROCEDURE — G8417 CALC BMI ABV UP PARAM F/U: HCPCS | Performed by: NURSE PRACTITIONER

## 2019-03-25 PROCEDURE — G8427 DOCREV CUR MEDS BY ELIG CLIN: HCPCS | Performed by: NURSE PRACTITIONER

## 2019-03-25 ASSESSMENT — ENCOUNTER SYMPTOMS
WHEEZING: 0
SHORTNESS OF BREATH: 0
COUGH: 0
EYE REDNESS: 1
RHINORRHEA: 1

## 2019-03-30 ASSESSMENT — LIFESTYLE VARIABLES: HOW OFTEN DO YOU HAVE A DRINK CONTAINING ALCOHOL: 0

## 2019-04-01 ENCOUNTER — OFFICE VISIT (OUTPATIENT)
Dept: FAMILY MEDICINE CLINIC | Age: 79
End: 2019-04-01
Payer: MEDICARE

## 2019-04-01 VITALS
SYSTOLIC BLOOD PRESSURE: 120 MMHG | HEIGHT: 66 IN | TEMPERATURE: 98 F | WEIGHT: 243.9 LBS | BODY MASS INDEX: 39.2 KG/M2 | HEART RATE: 71 BPM | DIASTOLIC BLOOD PRESSURE: 82 MMHG | OXYGEN SATURATION: 98 %

## 2019-04-01 DIAGNOSIS — Z00.00 ROUTINE GENERAL MEDICAL EXAMINATION AT A HEALTH CARE FACILITY: Primary | ICD-10-CM

## 2019-04-01 PROCEDURE — 4040F PNEUMOC VAC/ADMIN/RCVD: CPT | Performed by: INTERNAL MEDICINE

## 2019-04-01 PROCEDURE — G8598 ASA/ANTIPLAT THER USED: HCPCS | Performed by: INTERNAL MEDICINE

## 2019-04-01 PROCEDURE — G0439 PPPS, SUBSEQ VISIT: HCPCS | Performed by: INTERNAL MEDICINE

## 2019-04-01 ASSESSMENT — PATIENT HEALTH QUESTIONNAIRE - PHQ9
SUM OF ALL RESPONSES TO PHQ QUESTIONS 1-9: 0
SUM OF ALL RESPONSES TO PHQ QUESTIONS 1-9: 0

## 2019-04-01 NOTE — PROGRESS NOTES
Medicare Annual Wellness Visit  Name: Marylou Irizarry Date: 2019   MRN: [id number] Sex: Female   Age: 70 y.o. Ethnicity: Non-/Non    : 1940 Race: Harish Reyes is here for Medicare AWV    Screenings for behavioral, psychosocial and functional/safety risks, and cognitive dysfunction are all negative except as indicated below. These results, as well as other patient data from the [address] form, are documented in Flowsheets linked to this Encounter. Allergies   Allergen Reactions    Levaquin [Levofloxacin]      Pt was given in er and developed hives and redness    Pyridium [Phenazopyridine Hcl]     Reglan [Metoclopramide Hcl]     Reglan [Metoclopramide Hcl]      tremors     Prior to Visit Medications    Medication Sig Taking? Authorizing Provider   metoprolol tartrate (LOPRESSOR) 50 MG tablet Take 1 tablet by mouth 2 times daily Yes Richardson Mcdonald MD   gabapentin (NEURONTIN) 100 MG capsule Take 1 capsule by mouth every evening for 90 days. Emily Seals MD   ipratropium-albuterol (DUONEB) 0.5-2.5 (3) MG/3ML SOLN nebulizer solution Inhale 3 mLs into the lungs every 6 hours as needed for Shortness of Breath DX: J45.40 Yes BIJAN Davila   losartan (COZAAR) 50 MG tablet Take 1 tablet by mouth daily Yes BIJAN Davila   propafenone (RYTHMOL) 150 MG tablet Take 1 tablet by mouth every 8 hours Yes MAHAD Ely CNP   budesonide (PULMICORT FLEXHALER) 180 MCG/ACT AEPB inhaler Inhale 2 puffs into the lungs 2 times daily Yes BIJAN Davila   ELIQUIS 2.5 MG TABS tablet Take 1 tablet by mouth 2 times daily Yes Richardson Mcdonald MD   furosemide (LASIX) 40 MG tablet Take 1 tablet by mouth daily Yes MAHAD North CNP   carbidopa-levodopa (SINEMET)  MG per tablet TAKE 1 TABLET BY MOUTH 3 TIMES A DAY Yes Richardson Mcdonald MD   atorvastatin (LIPITOR) 40 MG tablet Take 1 and 1/2 tablets by mouth every evening Yes Lacey ETIENNE MD [place]   montelukast (SINGULAIR) 10 MG tablet Take 1 tablet by mouth daily. Yes Madeleine Restrepo MD   DULoxetine (CYMBALTA) 60 MG extended release capsule Take 1 capsule by mouth daily Yes Madeleine Restrepo MD   albuterol sulfate HFA (VENTOLIN HFA) 108 (90 Base) MCG/ACT inhaler Inhale 2 puffs into the lungs every 6 hours as needed for Wheezing Yes Madeleine Restrepo MD   fluticasone (FLONASE) 50 MCG/ACT nasal spray USE 2 PUFFS IN EACH NOSTRIL DAILY Yes Madeleine Restrepo MD   salmeterol (SEREVENT) 50 MCG/DOSE diskus inhaler Inhale 1 puff by mouth 2 times a day. Yes Madeleine Restrepo MD   lamoTRIgine (LAMICTAL) 25 MG tablet Take 50 mg by mouth 2 times daily  Yes Historical Provider, MD   Multiple Vitamins-Minerals (THERAPEUTIC MULTIVITAMIN-MINERALS) tablet Take 1 tablet by mouth daily Yes Historical Provider, MD   Alpha-Lipoic Acid 300 MG CAPS Take 300 mg by mouth daily Yes Historical Provider, MD   Handicap Placard MISC by Does not apply route. 12/12/14-12/12/19 Yes Madeleine Restrepo MD   aspirin EC 81 MG EC tablet Take 1 tablet by mouth daily. Start taking next week. Indications: OTC Yes Joni Downey MD   solifenacin (VESICARE) 5 MG tablet Take 1 tablet by mouth daily.   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-sided sciatica 8/9/2016    CKD (chronic kidney disease) stage 3, GFR 30-59 ml/min ([facility])     Community acquired pneumonia of left lower lobe of lung ([address] 9/8/2018    CRI     Detached retina, left     Diverticulosis     DJD (degenerative joint disease)     Edema     chronic    Fatty liver     GERD (gastroesophageal reflux disease)     Herniated lumbar intervertebral disc     Hx stage 2 sacral decubitus ulcer 4/13/2018    Hyperlipidemia     Hypertension     Hypothyroid 1/27/2015    Morbid obesity with BMI of 40.0-44.9, adult ([facility] [address] 2/10/2017    BETSY treated with BiPAP     Restless leg syndrome     Restless legs syndrome     Sleep apnea     Tremor, essential 8/16/2010    Type 2 diabetes mellitus without complication (HCC)     Type II or unspecified type diabetes mellitus without mention of complication, not stated as uncontrolled      Past Surgical History:   Procedure Laterality Date    APPENDECTOMY      CAROTID ENDARTERECTOMY      left    CATARACT REMOVAL      CERVICAL FUSION  11/2010    CHOLECYSTECTOMY      CORNEAL TRANSPLANT      EYE SURGERY      HERNIA REPAIR      HYSTERECTOMY      JOINT REPLACEMENT      KNEE SURGERY      replacement x 2    SHOULDER SURGERY      TONSILLECTOMY      TUBAL LIGATION      VITRECTOMY       Family History   Problem Relation Age of Onset    Diabetes Mother     Heart Disease Mother     Diabetes Father     Heart Disease Father     Diabetes Sister        CareTeam (Including outside providers/suppliers regularly involved in providing care):   Patient Care Team:  Lesley Buchanan MD as PCP - Mauro Mujica MD as PCP - [facility] Attributed Provider  Jesse Santoyo MD as Consulting Physician (Pulmonology)  Vito Phillips RN as Care Coordinator    Wt Readings from Last 3 Encounters:   04/01/19 243 lb 14.4 oz (110.6 kg)   03/25/19 251 lb (113.9 kg)   03/12/19 247 lb (112 kg)     Vitals:    04/01/19 1028   BP: 120/82   Site: Right Upper Arm   Position: Sitting   Cuff Size: Medium Adult   Pulse: 71   Temp: 98 °F (36.7 °C)   TempSrc: Oral   SpO2: 98%   Weight: 243 lb 14.4 oz (110.6 kg)   Height: 5' 6\" (1.676 m)     Body mass index is 39.37 kg/m². Based upon direct observation of the patient, evaluation of cognition reveals recent and remote memory intact. Patient's complete Health Risk Assessment and screening values have been reviewed and are found in Flowsheets.  The following problems were reviewed today and where indicated follow up appointments were made and/or referrals ordered. Positive Risk Factor Screenings with Interventions:     Health Habits/Nutrition:  Health Habits/Nutrition  Do you exercise for at least 20 minutes 2-3 times per week?: (P) Yes  Have you lost any weight without trying in the past 3 months?: (P) No  Do you eat fewer than 2 meals per day?: (P) No  Have you seen a dentist within the past year?: (P) Yes  Body mass index is 39.37 kg/m². Health Habits/Nutrition Interventions:  · Inadequate physical activity:  educational materials provided to promote increased physical activity    Safety:  Safety  Do you have working smoke detectors?: (P) Yes  Have all throw rugs been removed or fastened?: (P) Yes  Do you have non-slip mats in all bathtubs?: (!) (P) No  Do all of your stairways have a railing or banister?: Yes  Are your doorways, halls and stairs free of clutter?: (P) Yes  Do you always fasten your seatbelt when you are in a car?: (P) Yes  Safety Interventions:  · Home safety tips provided  · Patient declines any further evaluation/treatment for this issue    ADL:  ADLs  In the past 7 days, did you need help from others to perform any of the following everyday activities? Eating, dressing, grooming, bathing, toileting, or walking/balance?: (!) (P) Dressing, Walking/Balance  In the past 7 days, did you need help from others to take care of any of the following? Laundry, housekeeping, banking/finances, shopping, telephone use, food preparation, transportation, or taking medications?: (!) (P) Laundry, Housekeeping, Banking/Finances, Shopping, Food Preparation, Transportation  ADL Interventions:  · Patient declines any further evaluation/treatment for this issue  ·  helps.     Personalized Preventive Plan   Current Health Maintenance Status  Immunization History   Administered Date(s) Administered    Influenza Vaccine, unspecified formulation 08/09/2016    Influenza Virus Vaccine 09/27/2011, 10/01/2012    Influenza Whole 09/02/2015    Influenza, High Dose (Fluzone 65 yrs and older) 09/15/2017, 09/14/2018    Pneumococcal 13-valent Conjugate ([id number]) 07/28/2015    Pneumococcal Polysaccharide ([id number]) 11/26/2007        Health Maintenance   Topic Date Due    DTaP/Tdap/Td vaccine (1 - Tdap) 07/23/1961    Shingles Vaccine (1 of 2) 07/23/1992    TSH testing  02/04/2020    Potassium monitoring  02/04/2020    Creatinine monitoring  02/04/2020    DEXA (modify frequency per FRAX score)  Completed    Flu vaccine  Completed    Pneumococcal 65+ years Vaccine  Completed     Recommendations for Preventive Services Due: see orders and patient instructions/AVS.  . Recommended screening schedule for the next 5-10 years is provided to the patient in written form: see Patient Instructions/AVS.    Freda MEJIAS LPN, 7/6/[birth year], performed the documented evaluation under the direct supervision of the attending physician. This encounter was performed under Puja mcguire MDs, direct supervision, 4/1/2019.

## 2019-04-01 NOTE — PATIENT INSTRUCTIONS
diet is one that limits sodium , certain types of fat , and cholesterol . This type of diet is recommended for:   People with any form of cardiovascular disease (eg, coronary heart disease , peripheral vascular disease , previous heart attack , previous stroke )   People with risk factors for cardiovascular disease, such as high blood pressure , high cholesterol , or diabetes   Anyone who wants to lower their risk of developing cardiovascular disease   Sodium    Sodium is a mineral found in many foods. In general, most people consume much more sodium than they need. Diets high in sodium can increase blood pressure and lead to edema (water retention). On a heart-healthy diet, you should consume no more than 2,300 mg (milligrams) of sodium per dayabout the amount in one teaspoon of table salt. The foods highest in sodium include table salt (about 50% sodium), processed foods, convenience foods, and preserved foods. Cholesterol    Cholesterol is a fat-like, waxy substance in your blood. Our bodies make some cholesterol. It is also found in animal products, with the highest amounts in fatty meat, egg yolks, whole milk, cheese, shellfish, and organ meats. On a heart-healthy diet, you should limit your cholesterol intake to less than 200 mg per day. It is normal and important to have some cholesterol in your bloodstream. But too much cholesterol can cause plaque to build up within your arteries, which can eventually lead to a heart attack or stroke. The two types of cholesterol that are most commonly referred to are:   Low-density lipoprotein (LDL) cholesterol  Also known as bad cholesterol, this is the cholesterol that tends to build up along your arteries. Bad cholesterol levels are increased by eating fats that are saturated or hydrogenated. Optimal level of this cholesterol is less than 100. Over 130 starts to get risky for heart disease.    High-density lipoprotein (HDL) cholesterol  Also known as good cholesterol, this type of cholesterol actually carries cholesterol away from your arteries and may, therefore, help lower your risk of having a heart attack. You want this level to be high (ideally greater than 60). It is a risk to have a level less than 40. You can raise this good cholesterol by eating olive oil, canola oil, avocados, or nuts. Exercise raises this level, too. Fat    Fat is calorie dense and packs a lot of calories into a small amount of food. Even though fats should be limited due to their high calorie content, not all fats are bad. In fact, some fats are quite healthful. Fat can be broken down into four main types. The good-for-you fats are:   Monounsaturated fat  found in oils such as olive and canola, avocados, and nuts and natural nut butters; can decrease cholesterol levels, while keeping levels of HDL cholesterol high   Polyunsaturated fat  found in oils such as safflower, sunflower, soybean, corn, and sesame; can decrease total cholesterol and LDL cholesterol   Omega-3 fatty acids  particularly those found in fatty fish (such as salmon, trout, tuna, mackerel, herring, and sardines); can decrease risk of arrhythmias, decrease triglyceride levels, and slightly lower blood pressure   The fats that you want to limit are:   Saturated fat  found in animal products, many fast foods, and a few vegetables; increases total blood cholesterol, including LDL levels   Animal fats that are saturated include: butter, lard, whole-milk dairy products, meat fat, and poultry skin   Vegetable fats that are saturated include: hydrogenated shortening, palm oil, coconut oil, cocoa butter   Hydrogenated or trans fat  found in margarine and vegetable shortening, most shelf stable snack foods, and fried foods; increases LDL and decreases HDL     It is generally recommended that you limit your total fat for the day to less than 30% of your total calories.  If you follow an 1800-calorie heart healthy diet, for week) Tofu Nuts or seeds (unsalted, dry-roasted), low-sodium peanut butter Dried peas, beans, and lentils   Any smoked, cured, salted, or canned meat, fish, or poultry (including munoz, chipped beef, cold cuts, hot dogs, sausages, sardines, and anchovies) Poultry skins Breaded and/or fried fish or meats Canned peas, beans, and lentils Salted nuts   Fats and Oils   Olive oil and canola oil Low-sodium, low-fat salad dressings and mayonnaise   Butter, margarine, coconut and palm oils, munoz fat   Snacks, Sweets, and Condiments   Low-sodium or unsalted versions of broths, soups, soy sauce, and condiments Pepper, herbs, and spices; vinegar, lemon, or lime juice Low-fat frozen desserts (yogurt, sherbet, fruit bars) Sugar, cocoa powder, honey, syrup, jam, and preserves Low-fat, trans-fat free cookies, cakes, and pies Hilario and animal crackers, fig bars, jr snaps   High-fat desserts Broth, soups, gravies, and sauces, made from instant mixes or other high-sodium ingredients Salted snack foods Canned olives Meat tenderizers, seasoning salt, and most flavored vinegars   Beverages   Low-sodium carbonated beverages Tea and coffee in moderation Soy milk   Commercially softened water   Suggestions   Make whole grains, fruits, and vegetables the base of your diet. Choose heart-healthy fats such as canola, olive, and flaxseed oil, and foods high in heart-healthy fats, such as nuts, seeds, soybeans, tofu, and fish. Eat fish at least twice per week; the fish highest in omega-3 fatty acids and lowest in mercury include salmon, herring, mackerel, sardines, and canned chunk light tuna. If you eat fish less than twice per week or have high triglycerides, talk to your doctor about taking fish oil supplements. Read food labels.    For products low in fat and cholesterol, look for fat free, low-fat, cholesterol free, saturated fat free, and trans fat freeAlso scan the Nutrition Facts Label, which lists saturated fat, trans fat, care is a key part of your treatment and safety. Be sure to make and go to all appointments, and call your doctor if you are having problems. It's also a good idea to know your test results and keep a list of the medicines you take. How can you care for yourself at home? Get enough calcium and vitamin D. The [employer] recommends adults younger than age 46 need 1,000 mg of calcium and 600 IU of vitamin D each day. Women ages 46 to 79 need 1,200 mg of calcium and 600 IU of vitamin D each day. Men ages 46 to 79 need 1,000 mg of calcium and 600 IU of vitamin D each day. Adults 71 and older need 1,200 mg of calcium and 800 IU of vitamin D each day. Eat foods rich in calcium, like yogurt, cheese, milk, and dark green vegetables. Eat foods rich in vitamin D, like eggs, fatty fish, cereal, and fortified milk. Get some sunshine. Your body uses sunshine to make its own vitamin D. The safest time to be out in the sun is before 10 a.m. or after 3 p.m. Avoid getting sunburned. Sunburn can increase your risk of skin cancer. Talk to your doctor about taking a calcium plus vitamin D supplement. Ask about what type of calcium is right for you, and how much to take at a time. Adults ages 23 to 48 should not get more than 2,500 mg of calcium and 4,000 IU of vitamin D each day, whether it is from supplements and/or food. Adults ages 46 and older should not get more than 2,000 mg of calcium and 4,000 IU of vitamin D each day from supplements and/or food. Get regular bone-building exercise. Weight-bearing and resistance exercises keep bones healthy by working the muscles and bones against gravity. Start out at an exercise level that feels right for you. Add a little at a time until you can do the following:  Do 30 minutes of weight-bearing exercise on most days of the week. Walking, jogging, stair climbing, and dancing are good choices. Do resistance exercises with weights or elastic bands 2 to 3 days a week.   Limit healthier choices. This type of treatment can be broken down into three steps: The triggers that make you want to eat   Eating   What happens after you eat  Triggers to eat -- Determining what triggers you to eat involves figuring out what foods you eat and where and when you eat. To figure out what triggers you to eat, keep a record for a few days of everything you eat, the places where you eat, how often you eat, and the emotions you were feeling when you ate. For some people, the trigger is related to a certain time of day or night. For others, the trigger is related to a certain place, like sitting at a desk working. Eating -- You can change your eating habits by breaking the chain of events between the trigger for eating and eating itself. There are many ways to do this. For instance, you can:  Limit where you eat to a few places (eg, dining room)   Restrict the number of utensils (eg, only a fork) used for eating   Drink a sip of water between each bite   Chew your food a certain number of times   Get up and stop eating every few minutes  What happens after you eat -- Rewarding yourself for good eating behaviors can help you to develop better habits. This is not a reward for weight loss; instead, it is a reward for changing unhealthy behaviors. Do not use food as a reward. Some people find money, clothing, or personal care (eg, a hair cut, manicure, or massage) to be effective rewards. Treat yourself immediately after making better eating choices to reinforce the value of the good behavior. You need to have clear behavior goals, and you must have a time frame for reaching your goals.  Reward small changes along the way to your final goal.  Other factors that contribute to successful weight loss -- Changing your behavior involves more than just changing unhealthy eating habits; it also involves finding people around you to support your weight loss, reducing stress, and learning to be strong when tempted by food.  Establish a \"laurita\" system -- Having a friend or family member available to provide support and reinforce good behavior is very helpful. The support person needs to understand your goals. Learn to be strong -- Learning to be strong when tempted by food is an important part of losing weight. As an example, you will need to learn how to say \"no\" and continue to say no when urged to eat at parties and social gatherings. Develop strategies for events before you go, such as eating before you go or taking low-calorie snacks and drinks with you. Develop a support system -- Having a support system is helpful when losing weight. This is why many commercial groups are successful. Family support is also essential; if your family does not support your efforts to lose weight, this can slow your progress or even keep you from losing weight. Positive thinking -- People often have conversations with themselves in their head; these conversations can be positive or negative. If you eat a piece of cake that was not planned, you may respond by thinking, \"Oh, you stupid idiot, you've blown your diet! \" and as a result, you may eat more cake. A positive thought for the same event could be, \"Well, I ate cake when it was not on my plan. Now I should do something to get back on track. \" A positive approach is much more likely to be successful than a negative one. Reduce stress -- Although stress is a part of everyday life, it can trigger uncontrolled eating in some people. It is important to find a way to get through these difficult times without eating or by eating low-calorie food, like raw vegetables. It may be helpful to imagine a relaxing place that allows you to temporarily escape from stress. With deep breaths and closed eyes, you can imagine this relaxing place for a few minutes.    Self-help programs -- Self-help programs like [employer] [place] Watchers®, Overeaters Anonymous®, and Take Off Melia (TOPS)© work for some people. As with all weight loss programs, you are most likely to be successful with these plans if you make long-term changes in how you eat. CHOOSING A DIET -- A calorie is a unit of energy found in food. Your body needs calories to function. The goal of any diet is to burn up more calories than you eat. How quickly you lose weight depends upon several factors, such as your age, gender, and starting weight. Older people have a slower metabolism than young people, so they lose weight more slowly. Men lose more weight than women of similar height and weight when dieting because they use more energy. People who are extremely overweight lose weight more quickly than those who are only mildly overweight. Try not to drink alcohol or drinks with added sugar, and most sweets (candy, cakes, cookies), since they rarely contain important nutrients. Portion-controlled diets -- One simple way to diet is to buy packaged foods, like frozen low-calorie meals or meal-replacement canned drinks. A typical meal plan for one day may include:  A meal-replacement drink or breakfast bar for breakfast   A meal-replacement drink or a frozen low-calorie (250 to 350 calories) meal for lunch   A frozen low-calorie meal or other prepackaged, calorie-controlled meal, along with extra vegetables for dinner  This would give you 1000 to 1500 calories per day. Low-fat diet -- To reduce the amount of fat in your diet, you can:  Eat low-fat foods. Low-fat foods are those that contain less than 30 percent of calories from fat. Fat is listed on the food facts label (figure 1). Count fat grams. For a 1500 calorie diet, this would mean about 45 g or fewer of fat per day. Low-carbohydrate diet -- Low- and very-low-carbohydrate diets (eg, Atkins diet, [employer]) have become popular ways to lose weight quickly.   With a very-low-carbohydrate diet, you eat between 0 and 60 grams of carbohydrates per day (a standard diet contains 200 to 300 grams of carbohydrates)   With a low-carbohydrate diet, you eat between 60 and 130 grams of carbohydrates per day  Carbohydrates are found in fruits, vegetables, and grains (including breads, rice, pasta, and cereal), alcoholic beverages, and in dairy products. Meat and fish do not contain carbohydrates. Side effects of very-low-carbohydrate diets can include constipation, headache, bad breath, muscle cramps, diarrhea, and weakness. Mediterranean diet -- The term \"Mediterranean diet\" refers to a way of eating that is common in [place] regions around [facility]. Although there is some variation in Mediterranean diets, there are some similarities. Most Mediterranean diets include:  A high level of monounsaturated fats (from olive or canola oil, walnuts, pecans, almonds) and a low level of saturated fats (from butter)   A high amount of vegetables, fruits, legumes, and grains (7 to 10 servings of fruits and vegetables per day)   A moderate amount of milk and dairy products, mostly in the form of cheese. Use low-fat dairy products (skim milk, fat-free yogurt, low-fat cheese). A relatively low amount of red meat and meat products. Substitute fish or poultry for red meat. For those who drink alcohol, a modest amount (mainly as red wine) may help to protect against cardiovascular disease. A modest amount is up to one (4 ounce) glass per day for women and up to two glasses per day for men. Which diet is best? -- Studies have compared different diets, including:  Very-low-carbohydrate (Atkins)   Macronutrient balance controlling glycemic load (Zone®)   Reduced-calorie (Weight Watchers®)   Very-low-fat (Ornish)  No one diet is \"best\" for weight loss. Any diet will help you to lose weight if you stick with the diet. Therefore, it is important to choose a diet that includes foods you like.   Fad diets -- Fad diets often promise quick weight loss (more than 1 to 2 pounds per week) and may claim that you do not need to exercise or give up favorite foods. Some fad diets cost a lot of money, because you have to pay for seminars or pills. Fad diets generally lack any scientific evidence that they are safe and effective, but instead rely on \"before\" and \"after\" photos or testimonials. Diets that sound too good to be true usually are. These plans are a waste of time and money and are not recommended. A doctor, nurse, or nutritionist can help you find a safe and effective way to lose weight and keep it off. WEIGHT LOSS MEDICINES -- Taking a weight loss medicine may be helpful when used in combination with diet, exercise, and lifestyle changes. However, it is important to understand the risks and benefits of these medicines. It is also important to be realistic about your goal weight using a weight loss medicine; you may not reach your \"dream\" weight, but you may be able to reduce your risk of diabetes or heart disease. Weight loss medicines may be recommended for people who have not been able to lose weight with diet and exercise who have a:  BMI of 30 or more    BMI between 27 and 29.9 and have other medical problems, such as diabetes, high cholesterol, or high blood pressure  Two weight loss medicines are approved in the [place] for long-term use. These are sibutramine and orlistat. Other weight loss medicines (phentermine, diethylpropion) are available but are only approved for short-term use (up to 12 weeks). Sibutramine -- Sibutramine (Meridia®, Reductil®) is a medicine that reduces your appetite. In people who take the medicine for one year, the average weight loss is 10 percent of the initial body weight (5 percent more than those who took a placebo treatment). Side effects of sibutramine include insomnia, dry mouth, and constipation. Increases in blood pressure can occur. Therefore, blood pressure is usually monitored during treatment.  There is no evidence that sibutramine causes heart or lung problems (like dexfenfluramine and fenfluramine (Phen/Fen)). However, experts agree that sibutramine should not used by people with coronary heart disease, heart failure, uncontrolled hypertension, stroke, irregular heart rhythms, or peripheral vascular disease (poor circulation in the legs). Orlistat -- Orlistat (Xenical® 120 mg capsules) is a medicine that reduces the amount of fat your body absorbs from the foods you eat. A lower-dose version is now available without a prescription (Dane® 60 mg capsules) in many countries, including the [place]. The medicine is recommended three times per day, taken with a meal; you can skip a dose if you skip a meal or if the meal contains no fat. After one year of treatment with orlistat, the average weight loss is approximately 8 to 10 percent of initial body weight (4 percent more than in those who took a placebo). Cholesterol levels often improve, and blood pressure sometimes falls. In people with diabetes, orlistat may help control blood sugar levels. Side effects occur in 15 to 10 percent of people and may include stomach cramps, gas, diarrhea, leakage of stool, or oily stools. These problems are more likely when you take orlistat with a high-fat meal (if more than 30 percent of calories in the meal are from fat). Side effects usually improve as you learn to avoid high-fat foods. Severe liver injury has been reported rarely in patients taking orlistat, but it is not known if orlistat caused the liver problems. Diet supplements -- Diet supplements are widely used by people who are trying to lose weight, although the safety and efficacy of these supplements are often unproven. A few of the more common diet supplements are discussed below; none of these are recommended because they have not been studied carefully, and there is no proof they are safe or effective. Chitosan and wheat dextrin are ineffective for weight loss, and their use is not recommended. Ephedra, a compound related to ephedrine, is no longer available in the [place] due to safety concerns. Many nonprescription diet pills previously contained ephedra. Although some studies have shown that ephedra helps with weight loss, there can be serious side effects (psychiatric symptoms, palpitations, and stomach upset), including death. There are not enough data about the safety and efficacy of chromium, ginseng, glucomannan, green tea, hydroxycitric acid, L carnitine, psyllium, pyruvate supplements, Wake wort, and conjugated linoleic acid. Two supplements from [facility], [address] [place] (also known as the Jaja Monroy 15 pill) and Herbathin dietary supplement, have been shown to contain prescription drugs. Hoodia gordonii is a dietary supplement derived from a plant in [place]. It is not recommended because there is no proof that it is safe or effective. Bitter orange (Citrus aurantium) can increase your heart rate and blood pressure and is not recommended. SHOULD I HAVE SURGERY TO LOSE WEIGHT? -- Weight loss surgery is recommended ONLY for people with one of the following:  Severe obesity (body mass index above 40) (calculator 1 and calculator 2) who have not responded to diet, exercise, or weight loss medicines   Body mass index between 35 and 40, along with a serious medical problem (including diabetes, severe joint pain, or sleep apnea) that would improve with weight loss  You should be sure that you understand the potential risks and benefits of weight loss surgery. You must be motivated and willing to make lifelong changes in how you eat to reach and maintain a healthier weight after surgery. You must also be realistic about weight loss after surgery (see 'Effectiveness of weight loss surgery' below). PREPARING FOR WEIGHT LOSS SURGERY -- Most people who have weight loss surgery will meet with several specialists before surgery is scheduled.  This often includes a dietitian, mental health counselor, a doctor who specializes in care of obese people, and a surgeon who performs weight loss surgery (bariatric surgeon). You may need to work with these providers for several weeks or months before surgery. The nutritionist will explain what and how much you will be able to eat after surgery. You may also need to lose a small amount of weight before surgery. The mental health specialist will help you to cope with stress and other factors that can make it harder to lose weight or trigger you to eat   The medical doctor will determine whether you need other tests, counseling, or treatment before surgery. He or she might also help you begin a medical weight loss program so that you can lose some weight before surgery. The bariatric surgeon will meet with you to discuss the surgeries available to treat obesity. He or she will also make sure you are a good candidate for surgery. TYPES OF WEIGHT LOSS SURGERY -- There are several types of weight loss surgeries, the most common being lap banding, gastric bypass, and gastric sleeve. Lap banding -- Laparoscopic adjustable gastric banding (LAGB), or lap banding, is a surgery that uses an adjustable band around the opening to the stomach (figure 1). This reduces the amount of food that you can eat at one time. Lap banding is done through small incisions, with a laparoscope. The band can be adjusted after surgery, allowing you to eat more or less food. Adjustments to the size and tightness of the band are made by using a needle to add or remove fluid from a port (a small container under the skin that is connected to the band). Adding fluid to the band makes it tighter which restricts the amount of food you can eat and may help you to lose more weight. Lap banding is a popular choice because it is relatively simple to perform, can be adjusted or removed, and has a low risk of serious complications immediately after surgery.  However, weight loss with the lap band depends on your ability to follow the program closely. You will need to prepare nutritious meals that [facility] with\" the band, not against it. For example, the lap band will not work well if you eat or drink a large amount of liquid calories (like ice cream). The band will not help you to feel full when you eat/drink liquid calories. Weight loss ranges from 45 to 75 percent after two years. As an example, a person who is 120 pounds overweight could expect to lose approximately 54 to 90 pounds in the two years after lap banding. Gastric bypass -- Carloz-en-Y gastric bypass, also called gastric bypass, helps you to lose weight by reducing the amount of food you can eat and reducing the number of calories and nutrients you absorb from the food you eat. To perform gastric bypass, a surgeon creates a small stomach pouch by dividing the stomach and attaching it to the small intestine. This helps you to lose weight in two ways: The smaller stomach can hold less food than before surgery. This causes you to feel full after eating a very small amount of food or liquid. Over time, the pouch might stretch, allowing you to eat more food. The body absorbs fewer calories, since food bypasses most of the stomach as well as the upper small intestine. This new arrangement seems to decrease your appetite and change how you break down foods by changing the release of various hormones. Gastric bypass can be performed as open surgery (through an incision on the abdomen) or laparoscopically, which uses smaller incisions and smaller instruments. Both the laparoscopic and open techniques have risks and benefits. You and your surgeon should work together to decide which surgery, if any, is right for you. Gastric bypass has a high success rate, and people lose an average of 62 to 68 percent of their excess body weight in the first year.  Weight loss typically levels off after one to two years, with an overall excess weight loss between 48 and 75 percent. For a person who is 120 pounds overweight, an average of 60 to 90 pounds of weight loss would be expected. Gastric sleeve -- Gastric sleeve, also known as sleeve gastrectomy, is a surgery that reduces the size of the stomach and makes it into a narrow tube (figure 3). The new stomach is much smaller and produces less of the hormone (ghrelin) that causes hunger, helping you feel satisfied with less food. Sleeve gastrectomy is safer than gastric bypass because the intestines are not rearranged, and there is less chance of malnutrition. It also appears to control hunger better than lap banding. It might be safer than the lap banding because no foreign materials are used. The gastric sleeve has a good success rate, and people lose an average of 33 percent of their excess body weight in the first year. For a person who is 120 pounds overweight, this would mean losing about 40 pounds in the first year. WEIGHT LOSS SURGERY COMPLICATIONS -- A variety of complications can occur with weight loss surgery. The risks of surgery depend upon which surgery you have and any medical problems you had before surgery. Some of the more common early surgical complications (one to six weeks after surgery) include:  Bleeding   Infection   Blockage or tear in the bowels   Need for further surgery  Important medical complications after surgery can include blood clots in the legs or lungs, heart attack, pneumonia, and urinary tract infection. Complications are less likely when surgery is performed in centers that are experienced in weight loss surgery. In general, centers with experience in weight loss surgery have:  Board-certified doctors and surgeons   A team of support staff (dietitians, counselors, nurses)   Long-term follow-up after surgery   Hospital staff experienced with the care of weight loss patients.  This includes nurses who are trained in the care of patients immediately after surgery and anesthesiologists who are experienced in caring for the morbidly obese. EFFECTIVENESS OF WEIGHT LOSS SURGERY -- The goal of weight loss surgery is to reduce the risk of illness or death associated with obesity. Weight loss surgery can also help you to feel and look better, reduce the amount of money you spend on medicines, and cut down on sick days. As an example, weight loss surgery can improve health problems related to obesity (diabetes, high blood pressure, high cholesterol, sleep apnea) to the point that you need less or no medicine. Finally, weight loss surgery might reduce your risk of developing heart disease, cancer, and certain infections. AFTER WEIGHT LOSS SURGERY -- You will need to stay in the hospital until your team feels that it is safe for you to leave (on average, one to three days). Do not drive if you are taking prescription pain medicine. Begin exercising as soon as possible once you have healed; most weight loss centers will design an exercise program for you. Once you are home, it is important to eat and drink exactly what your doctor and dietitian recommend. You will see your doctor, nurse, and dietitian on a regular basis after surgery to monitor your health, diet, and weight loss. You will be able to slowly increase how much you eat over time, although it will always be important to:  Eat small, frequent meals and not skip meals   Chew your food slowly and completely   Avoid eating while \"distracted\" (such as eating while watching TV)   Stop eating when you feel full   Drink liquids at least 30 minutes before or after eating   Avoid foods high in fat or sugar   Take vitamin supplements, as recommended  It can take several months to learn to listen to your body so that you know when you are hungry and when you are full. You may dislike foods you previously loved, and you may begin to prefer new foods.  This can be a frustrating process for some people, so talk to your dietitian if you are having trouble. It usually takes between one and two years to lose weight after surgery. After reaching their goal weight, some people have plastic surgery (called \"body contouring\") to remove excess skin from the body, particularly in the abdominal area. Before you decide to have weight loss surgery, you must commit to staying healthy for life. This includes following up with your healthcare team, exercising most days of the week, and eating a sensible diet every day. It can be difficult to develop new eating and exercise habits after weight loss surgery, and you will have to work hard to stick to your goals. Recovering from surgery and losing weight can be stressful and emotional, and it is important to have the support of family and friends. Working with a , therapist, or support group can help you through the ups and downs. WHERE TO GET MORE INFORMATION -- Your healthcare provider is the best source of information for questions and concerns related to your medical problem. This article will be updated as needed every four months on our Web site ([email or web address])  ·     Keep Your Memory Beverlyn Day       Many factors can affect your ability to remembera hectic lifestyle, aging, stress, chronic disease, and certain medicines. But, there are steps you can take to sharpen your mind and help preserve your memory. Challenge Your Brain   Regularly challenging your mind may help keeps it in top shape. Good mental exercises include:   Crossword puzzlesUse a dictionary if you need it; you will learn more that way. Brainteasers Try some! Crafts, such as wood working and sewing   Hobbies, such as gardening and building model airplanes   SocializingVisit old friends or join groups to meet new ones.    Reading   Learning a new language   Taking a class, whether it be art history or yair chi   TravelingExperience the food, history, and culture of your destination   Learning to use a computer Going to museums, the theater, or thought-provoking movies   Changing things in your daily life, such as reversing your pattern in the grocery store or brushing your teeth using your nondominant hand   Use Memory Aids   There is no need to remember every detail on your own. These memory aids can help:   Calendars and day planners   Electronic organizers to store all sorts of helpful informationThese devices can \"beep\" to remind you of appointments. A book of days to record birthdays, anniversaries, and other occasions that occur on the same date every year   Detailed \"to-do\" lists and strategically placed sticky notes   Quick \"study\" sessionsBefore a gathering, review who will be there so their names will be fresh in your mind. Establish routinesFor example, keep your keys, wallet, and umbrella in the same place all the time or take medicine with your 8:00 AM glass of juice   Live a Healthy Life   Many actions that will keep your body strong will do the same for your mind. For example:   Talk to Your Doctor About Herbs and Supplements    Malnutrition and vitamin deficiencies can impair your mental function. For example, vitamin B12 deficiency can cause a range of symptoms, including confusion. But, what if your nutritional needs are being met? Can herbs and supplements still offer a benefit? Researchers have investigated a range of natural remedies, such as ginkgo , ginseng , and the supplement phosphatidylserine (PS). So far, though, the evidence is inconsistent as to whether these products can improve memory or thinking. If you are interested in taking herbs and supplements, talk to your doctor first because they may interact with other medicines that you are taking. Exercise Regularly    Among the many benefits of regular exercise are increased blood flow to the brain and decreased risk of certain diseases that can interfere with memory function.  One study found that even moderate exercise has a recommend:   Have your hearing and vision checked regularly. Be sure to wear prescription glasses that are right for you. Speak to your doctor or pharmacist about the possible side effects of your medicines. A number of medicines can cause dizziness. If you have problems with sleep, talk to your doctor. Limit your intake of alcohol. If necessary, use a cane or walker to help maintain your balance. Wear supportive, rubber-soled shoes, even at home. If you live in a region that gets wintry weather, you may want to put special cleats on your shoes to prevent you from slipping on the snow and ice. Exercise regularly to help maintain muscle tone, agility, and balance. Always hold the banister when going up or down stairs. Also, use  bars when getting in or out of the bath or shower, or using the toilet. To avoid dizziness, get up slowly from a lying down position. Sit up first, dangling your legs for a minute or two before rising to a standing position. Overall Home Safety Check   According to the Consumer Product Safety Commision's \"Older Consumer Home Safety Checklist,\" it is important to check for potential hazards in each room. And remember, proper lighting is an essential factor in home safety. If you cannot see clearly, you are more likely to fall. Important questions to ask yourself include:   Are lamp, electric, extension, and telephone cords placed out of the flow of traffic and maintained in good condition? Have frayed cords been replaced? Are all small rugs and runners slip resistant? If not, you can secure them to the floor with a special double-sided carpet tape. Are smoke detectors properly locatedone on every floor of your home and one outside of every sleeping area? Are they in good working order? Are batteries replaced at least once a year? Do you have a well-maintained carbon monoxide detector outside every sleeping are in your home?    Does your furniture layout leave plenty of space to maneuver between and around chairs, tables, beds, and sofas? Are hallways, stairs and passages between rooms well lit? Can you reach a lamp without getting out of bed? Are floor surfaces well maintained? Shag rugs, high-pile carpeting, tile floors, and polished wood floors can be particularly slippery. Stairs should always have handrails and be carpeted or fitted with a non-skid tread. Is your telephone easily reachable. Is the cord safely tucked away? Room by Room   According to the Association of Aging, bathrooms and stiven are the two most potentially hazardous rooms in your home. In the Kitchen    Be sure your stove is in proper working order and always make sure burners and the oven are off before you go out or go to sleep. Keep pots on the back burners, turn handles away from the front of the stove, and keep stove clean and free of grease build-up. Kitchen ventilation systems and range exhausts should be working properly. Keep flammable objects such as towels and pot holders away from the cooking area except when in use. Make sure kitchen curtains are tied back. Move cords and appliances away from the sink and hot surfaces. If extension cords are needed, install wiring guides so they do not hang over the sink, range, or working areas. Look for coffee pots, kettles and toaster ovens with automatic shut-offs. Keep a mop handy in the kitchen so you can wipe up spills instantly. You should also have a small fire extinguisher. Arrange your kitchen with frequently used items on lower shelves to avoid the need to stand on a stepstool to reach them. Make sure countertops are well-lit to avoid injuries while cutting and preparing food. In the Bathroom    Use a non-slip mat or decals in the tub and shower, since wet, soapy tile or porcelain surfaces are extremely slippery. Make sure bathroom rugs are non-skid or tape them firmly to the floor.  Bathtubs should them away from flammable materials. In case of fire, make sure you have a pre-established emergency exit plan. Have a professional check your fireplace and other fuel-burning appliances yearly. Helping Hands   Baby boomers entering the russell years will continue to see the development of new products to help older adults live safely and independently in spite of age-related changes. Making Life More Livable  , by [employer], lists over 1,000 products for \"living well in the mature years,\" such as bathing and mobility aids, household security devices, ergonomically designed knives and peelers, and faucet valves and knobs for temperature control. Medical supply stores and organizations are good sources of information about products that improve your quality of life and insure your safety.      Last Reviewed: November 2009 Ritika Rodriguez MD   Updated: 3/7/2011     ·

## 2019-04-10 ENCOUNTER — CARE COORDINATION (OUTPATIENT)
Dept: CARE COORDINATION | Age: 79
End: 2019-04-10

## 2019-04-10 NOTE — CARE COORDINATION
Ambulatory Care Coordination Note  4/10/2019  CM Risk Score: 13  Theron Mortality Risk Score: 32    ACC: Rustam Garland, RN    Summary Note: Called and spoke to pt for CC outreach. Pt stated she \"fell last week\". \"I was in the bedroom and I dropped a blouse. I bent over to pick it up and fell head first on to the floor. \"  Pt stated she has a \"large hematoma on back of leg\". Pt stated she was seen by Kay Barr after the fall. She was referred to [facility] \"[facility]nee [facility]\". Pt had both knees replaced \"several years ago and the doctor is concerned\". Pt stated she is seeing the \"knee specialist\" because she \"might need another knee replacement\". Appt is tomorrow. Pt weight is 246lbs. Pt stated \"breathing is like it always is\". Patient denies symptoms such as increased swelling (beyond hematoma on L Leg), SOB, dyspnea, orthopenia and chest pain. Reviewed 3/5 rule with patient, pt VU. Plan     -F/U with pt Monday after Ortho appt. Chip Cellar PCP about fall            Care Coordination Interventions    Program Enrollment:  Complex Care  Referral from Primary Care Provider:  No  Suggested Interventions and Community Resources  Occupational Therapy:  Completed (Comment: Assist with home function and make recommendations about adaptive equipment)  Physical Therapy:  Completed (Comment: LSVT)  Other Therapy Services:  Completed (Comment: Speech )  Other Services or Interventions:  Consider wound care after examination of skin on 4/12/18         Goals Addressed                 This Visit's Progress     Reduce Falls         I will reduce my risk of falls by the following: Using grab bars, asking for help when needed    Barriers: impairment:  forgetful and overwhelmed by complexity of regimen  Plan for overcoming my barriers: I will ask  for help when needed  Confidence: 5/10  Anticipated Goal Completion Date: 7/1/19            Prior to Admission medications    Medication Sig Start Date End Date Taking? Authorizing Provider   metoprolol tartrate (LOPRESSOR) 50 MG tablet Take 1 tablet by mouth 2 times daily 2/19/19   David Das MD   gabapentin (NEURONTIN) 100 MG capsule Take 1 capsule by mouth every evening for 90 days. . 2/19/19 5/20/19  David Das MD   ipratropium-albuterol (DUONEB) 0.5-2.5 (3) MG/3ML SOLN nebulizer solution Inhale 3 mLs into the lungs every 6 hours as needed for Shortness of Breath DX: J45.40 2/6/19   BIJAN Rivera   losartan (COZAAR) 50 MG tablet Take 1 tablet by mouth daily 1/10/19   BIJAN [employer]a   propafenone (RYTHMOL) 150 MG tablet Take 1 tablet by mouth every 8 hours 1/10/19   MAHAD Guzman CNP   budesonide (PULMICORT FLEXHALER) 180 MCG/ACT AEPB inhaler Inhale 2 puffs into the lungs 2 times daily 1/9/19   BIJAN Rivera   ELIQUIS 2.5 MG TABS tablet Take 1 tablet by mouth 2 times daily 12/13/18   David Das MD   furosemide (LASIX) 40 MG tablet Take 1 tablet by mouth daily 10/4/18   MAHAD Fuentes CNP   carbidopa-levodopa (SINEMET)  MG per tablet TAKE 1 TABLET BY MOUTH 3 TIMES A DAY 7/30/18   David Das MD   atorvastatin (LIPITOR) 40 MG tablet Take 1 and 1/2 tablets by mouth every evening 7/30/18   Bandar Martino MD   montelukast (SINGULAIR) 10 MG tablet Take 1 tablet by mouth daily. 7/30/18   David Das MD   DULoxetine (CYMBALTA) 60 MG extended release capsule Take 1 capsule by mouth daily 7/30/18   David Das MD   albuterol sulfate HFA (VENTOLIN HFA) 108 (90 Base) MCG/ACT inhaler Inhale 2 puffs into the lungs every 6 hours as needed for Wheezing 7/30/18   David Das MD   fluticasone (FLONASE) 50 MCG/ACT nasal spray USE 2 PUFFS IN EACH NOSTRIL DAILY 7/24/18   Bandar Martino MD   salmeterol (SEREVENT) 50 MCG/DOSE diskus inhaler Inhale 1 puff by mouth 2 times a day.  5/11/18   David Das MD   lamoTRIgine (LAMICTAL) 25 MG tablet Take 50 mg by mouth 2 times daily     Historical Provider, MD   Multiple Vitamins-Minerals (THERAPEUTIC MULTIVITAMIN-MINERALS) tablet Take 1 tablet by mouth daily    Historical Provider, MD   Alpha-Lipoic Acid 300 MG CAPS Take 300 mg by mouth daily    Historical Provider, MD   Handicap Placard MISC by Does not apply route. 12/12/14-12/12/19 12/12/14   Caleb Martino MD   solifenacin (VESICARE) 5 MG tablet Take 1 tablet by mouth daily. 6/25/13 10/29/13  Martha Dawson MD   aspirin EC 81 MG EC tablet Take 1 tablet by mouth daily. Start taking next week.   Indications: OTC 2/25/13   Martine Ayala MD       Future Appointments   Date Time Provider Katy Ade   7/25/2019  1:45 PM Pressley Shone, MD [facility]   8/5/2019 10:00 AM MD [facility]   11/13/2019 10:30 AM MAHAD Rand - CNP [facility]   4/6/2020 10:00 AM SCHEDULE, [facility] ELAINE LPANDREY PRIDE [facility]

## 2019-04-18 ENCOUNTER — CARE COORDINATION (OUTPATIENT)
Dept: CARE COORDINATION | Age: 79
End: 2019-04-18

## 2019-04-18 NOTE — CARE COORDINATION
Ambulatory Care Coordination Note  4/18/2019  CM Risk Score: 13  Theron Mortality Risk Score: 32    ACC: Teresa Abreu, RN    Summary Note: Called and spoke to pt for CC outreach. Pt saw orthopedic \"knee specialist\" at [facility] to f/u from fall. Pt stated ortho said her knee is \"okay and I will not need surgery\". Pt stated she still has the hematoma and swelling has improved slightly. Pt breathing is slightly worse than baseline. Pt stated she \"doesn't seem to notice\". Pt stated her  \"points it out and tells me to use my inhaler\". Pt stated inhaler helps. Reminded pt to call RN [facility] or PCP office if breathing declines and/ or inhalers are not working. Pt VU. Pt is weighing daily. Weight has been fluctuating. Pt attributes to not following diet. \"I have not been following my diet. I know what I am supposed to eat and what I shouldn't eat. \"  Weight is fluctuating from 247-252. Patient denies symptoms such as increased swelling, chest pain. Reviewed 3/5 rule with patient, pt VU. Reminded pt to follow diet. Encouraged pt to watch sodium intake. Pt VU. Plan     -Will call pt for symptom check in 2 weeks.             Care Coordination Interventions    Program Enrollment:  Complex Care  Referral from Primary Care Provider:  No  Suggested Interventions and Community Resources  Occupational Therapy:  Completed (Comment: Assist with home function and make recommendations about adaptive equipment)  Physical Therapy:  Completed (Comment: LSVT)  Other Therapy Services:  Completed (Comment: Speech )  Other Services or Interventions:  Consider wound care after examination of skin on 4/12/18         Goals Addressed                 This Visit's Progress     Reduce Falls    Improving     I will reduce my risk of falls by the following: Using grab bars, asking for help when needed    Barriers: impairment:  forgetful and overwhelmed by complexity of regimen  Plan for overcoming my barriers: I will ask  for help when needed  Confidence: 5/10  Anticipated Goal Completion Date: 7/1/19            Prior to Admission medications    Medication Sig Start Date End Date Taking? Authorizing Provider   metoprolol tartrate (LOPRESSOR) 50 MG tablet Take 1 tablet by mouth 2 times daily 2/19/19   Rob Carrillo MD   gabapentin (NEURONTIN) 100 MG capsule Take 1 capsule by mouth every evening for 90 days. . 2/19/19 5/20/19  Rob Carrillo MD   ipratropium-albuterol (DUONEB) 0.5-2.5 (3) MG/3ML SOLN nebulizer solution Inhale 3 mLs into the lungs every 6 hours as needed for Shortness of Breath DX: J45.40 2/6/19   Art BIJAN Rodriguez   losartan (COZAAR) 50 MG tablet Take 1 tablet by mouth daily 1/10/19   Art BIJAN Rodriguez   propafenone (RYTHMOL) 150 MG tablet Take 1 tablet by mouth every 8 hours 1/10/19   MAHAD Galarza - CNP   budesonide (PULMICORT FLEXHALER) 180 MCG/ACT AEPB inhaler Inhale 2 puffs into the lungs 2 times daily 1/9/19   Art BIJAN Rodriguez   ELIQUIS 2.5 MG TABS tablet Take 1 tablet by mouth 2 times daily 12/13/18   Rob Carrillo MD   furosemide (LASIX) 40 MG tablet Take 1 tablet by mouth daily 10/4/18   MAHAD Connolly CNP   carbidopa-levodopa (SINEMET)  MG per tablet TAKE 1 TABLET BY MOUTH 3 TIMES A DAY 7/30/18   Rob Carrillo MD   atorvastatin (LIPITOR) 40 MG tablet Take 1 and 1/2 tablets by mouth every evening 7/30/18   Kaleb Martino MD   montelukast (SINGULAIR) 10 MG tablet Take 1 tablet by mouth daily.  7/30/18   Rob Carrillo MD   DULoxetine (CYMBALTA) 60 MG extended release capsule Take 1 capsule by mouth daily 7/30/18   Rob Carrillo MD   albuterol sulfate HFA (VENTOLIN HFA) 108 (90 Base) MCG/ACT inhaler Inhale 2 puffs into the lungs every 6 hours as needed for Wheezing 7/30/18   Rob Carrillo MD   fluticasone (FLONASE) 50 MCG/ACT nasal spray USE 2 PUFFS IN EACH NOSTRIL DAILY 7/24/18   Kaleb Martino MD   salmeterol (SEREVENT) 50 MCG/DOSE diskus inhaler

## 2019-04-22 RX ORDER — DULOXETIN HYDROCHLORIDE 60 MG/1
60 CAPSULE, DELAYED RELEASE ORAL DAILY
Qty: 90 CAPSULE | Refills: 0 | Status: SHIPPED | OUTPATIENT
Start: 2019-04-22 | End: 2019-07-15 | Stop reason: SDUPTHER

## 2019-04-22 NOTE — TELEPHONE ENCOUNTER
Requested Prescriptions     Pending Prescriptions Disp Refills    DULoxetine (CYMBALTA) 60 MG extended release capsule [Pharmacy Med Name: DULOXETINE DR 60MG CAP] 90 capsule 0     Sig: Take 1 capsule by mouth daily     Last seen: 4/01/19  Last Filled: 7/30/18  Upcoming Appointment: 8/05/19

## 2019-04-23 DIAGNOSIS — G95.20 CERVICAL SPINAL CORD COMPRESSION (HCC): Chronic | ICD-10-CM

## 2019-04-23 NOTE — TELEPHONE ENCOUNTER
Last office visit 4/1/2019     Last written     Next office visit scheduled 8/5/2019    Requested Prescriptions     Pending Prescriptions Disp Refills    losartan (COZAAR) 50 MG tablet 90 tablet 1     Sig: Take 1 tablet by mouth daily    gabapentin (NEURONTIN) 100 MG capsule 90 capsule 0     Sig: Take 1 capsule by mouth every evening for 90 days.     metoprolol tartrate (LOPRESSOR) 50 MG tablet 180 tablet 0     Sig: Take 1 tablet by mouth 2 times daily

## 2019-04-29 RX ORDER — GABAPENTIN 100 MG/1
100 CAPSULE ORAL EVERY EVENING
Qty: 90 CAPSULE | Refills: 0 | OUTPATIENT
Start: 2019-04-29 | End: 2019-07-28

## 2019-04-29 RX ORDER — LOSARTAN POTASSIUM 50 MG/1
50 TABLET ORAL DAILY
Qty: 90 TABLET | Refills: 1 | Status: ON HOLD | OUTPATIENT
Start: 2019-04-29 | End: 2019-05-28 | Stop reason: HOSPADM

## 2019-04-29 RX ORDER — METOPROLOL TARTRATE 50 MG/1
50 TABLET, FILM COATED ORAL 2 TIMES DAILY
Qty: 180 TABLET | Refills: 0 | Status: SHIPPED | OUTPATIENT
Start: 2019-04-29 | End: 2019-08-18 | Stop reason: SDUPTHER

## 2019-05-06 RX ORDER — PROPAFENONE HYDROCHLORIDE 150 MG/1
150 TABLET, FILM COATED ORAL EVERY 8 HOURS SCHEDULED
Qty: 90 TABLET | Refills: 3 | Status: SHIPPED | OUTPATIENT
Start: 2019-05-06 | End: 2019-08-18 | Stop reason: SDUPTHER

## 2019-05-06 NOTE — TELEPHONE ENCOUNTER
[facility] called requesting a refill for pt's Propafenone.  Please send to [facility] [facility] - [facility] [address] - [address] [phone]

## 2019-05-06 NOTE — TELEPHONE ENCOUNTER
3/25/19 NPDD  1. No change in heart medicines  2. Have blood work per Dr. Larissa Paredes in May as planned  3.  Follow up with Dr. Sylwia Montoya in 4 months

## 2019-05-07 ENCOUNTER — CARE COORDINATION (OUTPATIENT)
Dept: CARE COORDINATION | Age: 79
End: 2019-05-07

## 2019-05-07 ASSESSMENT — ENCOUNTER SYMPTOMS: DYSPNEA ASSOCIATED WITH: EXERTION

## 2019-05-07 NOTE — CARE COORDINATION
Ambulatory Care Coordination Note  5/7/2019  CM Risk Score: 13  Theron Mortality Risk Score: 32    ACC: Bev Courtney, RN    Summary Note: Called pt for RN CC outreach. Pt stated she is \"doing okay\". Pt stated hematoma \"has gotten smaller and it doesn't hurt anymore\". Inquired about pts breathing. Pt stated she \"isn't sure\" then asked . Pts  stated \"it's not as good as it could be\". Pt stated she is not coughing, \"just SOB\". Pt and  declined appt with PCP.  stated he \"doesn't think that is necessary yet\". Stated inhalers \"help\". Encouraged pt to call office or RN CC if breathing worsens. Pt stated she has not been following diabetic diet. \"I have been eating junk\". FBS today was 140. Encouraged pt to follow diet. Pt VU. Pt stated weight is fluctuating but attributes to \"eating junk\". Patient denies symptoms such as increased swelling and chest pain. Reviewed 3/5 rule with patient, pt VU. Pt has no other concerns or questions at this time.       Plan     -F/U with pt in 2 weeks for symptom check            Care Coordination Interventions    Program Enrollment:  Complex Care  Referral from Primary Care Provider:  No  Suggested Interventions and Community Resources  Occupational Therapy:  Completed (Comment: Assist with home function and make recommendations about adaptive equipment)  Physical Therapy:  Completed (Comment: LSVT)  Other Therapy Services:  Completed (Comment: Speech )  Other Services or Interventions:  Consider wound care after examination of skin on 4/12/18         Goals Addressed                 This Visit's Progress     Reduce Falls    On track     I will reduce my risk of falls by the following: Using grab bars, asking for help when needed    Barriers: impairment:  forgetful and overwhelmed by complexity of regimen  Plan for overcoming my barriers: I will ask  for help when needed  Confidence: 5/10  Anticipated Goal Completion Date: 7/1/19 Prior to Admission medications    Medication Sig Start Date End Date Taking? Authorizing Provider   propafenone (RYTHMOL) 150 MG tablet Take 1 tablet by mouth every 8 hours 5/6/19   MAHAD Bonilla CNP   losartan (COZAAR) 50 MG tablet Take 1 tablet by mouth daily 4/29/19   BIJAN Felix   metoprolol tartrate (LOPRESSOR) 50 MG tablet Take 1 tablet by mouth 2 times daily 4/29/19   BIJAN Felix   DULoxetine (CYMBALTA) 60 MG extended release capsule Take 1 capsule by mouth daily 4/22/19   BIJAN Felix   gabapentin (NEURONTIN) 100 MG capsule Take 1 capsule by mouth every evening for 90 days. . 2/19/19 5/20/19  Lesley Buchanan MD   ipratropium-albuterol (DUONEB) 0.5-2.5 (3) MG/3ML SOLN nebulizer solution Inhale 3 mLs into the lungs every 6 hours as needed for Shortness of Breath DX: J45.40 2/6/19   BIJAN Felix   budesonide (PULMICORT [facility]) 180 MCG/ACT AEPB inhaler Inhale 2 puffs into the lungs 2 times daily 1/9/19   BIJAN Felix   ELIQUIS 2.5 MG TABS tablet Take 1 tablet by mouth 2 times daily 12/13/18   Lesley Buchanan MD   furosemide (LASIX) 40 MG tablet Take 1 tablet by mouth daily 10/4/18   MAHAD Blankenship CNP   carbidopa-levodopa (SINEMET)  MG per tablet TAKE 1 TABLET BY MOUTH 3 TIMES A DAY 7/30/18   Lesley Buchanan MD   atorvastatin (LIPITOR) 40 MG tablet Take 1 and 1/2 tablets by mouth every evening 7/30/18   Luke Martino MD   montelukast (SINGULAIR) 10 MG tablet Take 1 tablet by mouth daily. 7/30/18   Luke Martino MD   albuterol sulfate HFA (VENTOLIN HFA) 108 (90 Base) MCG/ACT inhaler Inhale 2 puffs into the lungs every 6 hours as needed for Wheezing 7/30/18   Lesley Buchanan MD   fluticasone (FLONASE) 50 MCG/ACT nasal spray USE 2 PUFFS IN EACH NOSTRIL DAILY 7/24/18   Luke Martino MD   salmeterol (SEREVENT) 50 MCG/DOSE diskus inhaler Inhale 1 puff by mouth 2 times a day.  5/11/18   Lesley Buchanan MD   lamoTRIgine (LAMICTAL) 25 MG tablet Take 50 mg by mouth 2 times daily     Historical Provider, MD   Multiple Vitamins-Minerals (THERAPEUTIC MULTIVITAMIN-MINERALS) tablet Take 1 tablet by mouth daily    Historical Provider, MD   Alpha-Lipoic Acid 300 MG CAPS Take 300 mg by mouth daily    Historical Provider, MD   Handicap Placard MISC by Does not apply route. 12/12/14-12/12/19 12/12/14   Terrell Martino MD   solifenacin (VESICARE) 5 MG tablet Take 1 tablet by mouth daily. 6/25/13 10/29/13  Katelin Shin MD   aspirin EC 81 MG EC tablet Take 1 tablet by mouth daily. Start taking next week.   Indications: OTC 2/25/13   Keith Hardy MD       Future Appointments   Date Time Provider Katy Sorianoi   7/25/2019  1:45 PM MD Raysa Lagunas [facility]   8/5/2019 10:00 AM MD BIGG Robbins [facility]   11/13/2019 10:30 AM MAHAD Vergara - CNP [facility]   4/6/2020 10:00 AM SCHEDULE, [facility] LPN [facility]

## 2019-05-10 ENCOUNTER — HOSPITAL ENCOUNTER (EMERGENCY)
Age: 79
Discharge: HOME | End: 2019-05-10
Payer: MEDICARE

## 2019-05-10 ENCOUNTER — APPOINTMENT (OUTPATIENT)
Dept: CT IMAGING | Age: 79
End: 2019-05-10
Payer: MEDICARE

## 2019-05-10 VITALS
HEIGHT: 66 IN | HEART RATE: 75 BPM | SYSTOLIC BLOOD PRESSURE: 216 MMHG | TEMPERATURE: 98.7 F | BODY MASS INDEX: 40.82 KG/M2 | RESPIRATION RATE: 15 BRPM | OXYGEN SATURATION: 93 % | WEIGHT: 254 LBS | DIASTOLIC BLOOD PRESSURE: 61 MMHG

## 2019-05-10 DIAGNOSIS — S00.01XA ABRASION OF SCALP, INITIAL ENCOUNTER: ICD-10-CM

## 2019-05-10 DIAGNOSIS — S00.03XA CONTUSION OF SCALP, INITIAL ENCOUNTER: ICD-10-CM

## 2019-05-10 DIAGNOSIS — S09.90XA INJURY OF HEAD, INITIAL ENCOUNTER: Primary | ICD-10-CM

## 2019-05-10 PROCEDURE — 72125 CT NECK SPINE W/O DYE: CPT

## 2019-05-10 PROCEDURE — 99283 EMERGENCY DEPT VISIT LOW MDM: CPT

## 2019-05-10 PROCEDURE — 6370000000 HC RX 637 (ALT 250 FOR IP): Performed by: PHYSICIAN ASSISTANT

## 2019-05-10 PROCEDURE — 70450 CT HEAD/BRAIN W/O DYE: CPT

## 2019-05-10 RX ORDER — ACETAMINOPHEN 325 MG/1
650 TABLET ORAL ONCE
Status: COMPLETED | OUTPATIENT
Start: 2019-05-10 | End: 2019-05-10

## 2019-05-10 RX ADMIN — ACETAMINOPHEN 650 MG: 325 TABLET ORAL at 15:19

## 2019-05-10 ASSESSMENT — ENCOUNTER SYMPTOMS
SHORTNESS OF BREATH: 0
NAUSEA: 0
VOMITING: 0
BACK PAIN: 0
ABDOMINAL PAIN: 0
FACIAL SWELLING: 0

## 2019-05-10 ASSESSMENT — PAIN SCALES - GENERAL
PAINLEVEL_OUTOF10: 7
PAINLEVEL_OUTOF10: 7

## 2019-05-10 ASSESSMENT — PAIN DESCRIPTION - PAIN TYPE: TYPE: ACUTE PAIN

## 2019-05-10 ASSESSMENT — PAIN DESCRIPTION - LOCATION: LOCATION: HEAD

## 2019-05-10 NOTE — ED PROVIDER NOTES
[address] [facility]  ED  eMERGENCY dEPARTMENT eNCOUnter        Pt Name: Shyla Bustos  MRN: [id number]  [place] 1940  Date of evaluation: 5/10/2019  Provider: Codi Gayle PA-C  PCP: Kwabena Morrison MD  ED Attending: Edi Epps MD    History provided by the patient    CHIEF COMPLAINT:     Chief Complaint   Patient presents with    Fall     Pt states she fell over her feet denies LOC        HISTORY OF PRESENT ILLNESS:      Shyla Bustos is a 70 y.o. female who arrives to the ED by [facility]. The patient is here to be evaluated for injuries status post fall. The patient reports she has Parkinson's disease and a chronically unsteady gait. She fell in her home today secondary to her gait and hit the back of her head on a ceramic floor. She did not lose consciousness. She has a mild headache but denies any dizziness, lightheadedness, nausea and has had no confusion or mental status changes. She is on Eliquis an aspirin. The patient is here to be evaluated for head injury. She does have a contusion and abrasion of the back of her head but no deep laceration or persistent bleeding. She rates her headache 7/10. She can't identify exacerbating or alleviating factors. She denies any other injuries from this fall. Nursing Notes were reviewed     REVIEW OF SYSTEMS:     Review of Systems   Constitutional: Negative for appetite change, chills and fever. HENT: Negative for facial swelling. Eyes: Negative for visual disturbance. Respiratory: Negative for shortness of breath. Cardiovascular: Negative for chest pain. Gastrointestinal: Negative for abdominal pain, nausea and vomiting. Genitourinary: Negative. Musculoskeletal: Negative for back pain and neck pain. Skin: Positive for wound (scalp abrasion). Neurological: Positive for headaches. Negative for dizziness, weakness, light-headedness and numbness. Psychiatric/Behavioral: Negative for confusion.    All other systems reviewed and are negative. Exceptas noted above in the ROS, all other systems were reviewed and negative.          PAST MEDICAL HISTORY:     Past Medical History:   Diagnosis Date    Anemia, unspecified 2010    neg bone marrow    Asthma     Baker's cyst     Blind left eye     Carotid artery stenoses     Carotid stenosis 2002    left    Cervical spinal cord compression ([facility] [address] 11/2010    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-sided sciatica 8/9/2016    CKD (chronic kidney disease) stage 3, GFR 30-59 ml/min ([facility])     Community acquired pneumonia of left lower lobe of lung ([facility] [address] 9/8/2018    CRI     Detached retina, left     Diverticulosis     DJD (degenerative joint disease)     Edema     chronic    Fatty liver     GERD (gastroesophageal reflux disease)     Herniated lumbar intervertebral disc     Hx stage 2 sacral decubitus ulcer 4/13/2018    Hyperlipidemia     Hypertension     Hypothyroid 1/27/2015    Morbid obesity with BMI of 40.0-44.9, adult ([facility] 2/10/2017    BETSY treated with BiPAP     Restless leg syndrome     Restless legs syndrome     Sleep apnea     Tremor, essential 8/16/2010    Type 2 diabetes mellitus without complication ([facility])     Type II or unspecified type diabetes mellitus without mention of complication, not stated as uncontrolled          SURGICAL HISTORY:      Past Surgical History:   Procedure Laterality Date    APPENDECTOMY      CAROTID ENDARTERECTOMY      left    CATARACT REMOVAL      CERVICAL FUSION  11/2010    CHOLECYSTECTOMY      CORNEAL TRANSPLANT      EYE SURGERY      HERNIA REPAIR      HYSTERECTOMY      JOINT REPLACEMENT      KNEE SURGERY      replacement x 2    SHOULDER SURGERY      TONSILLECTOMY      TUBAL LIGATION      VITRECTOMY           CURRENT MEDICATIONS:       Previous Medications    ALBUTEROL SULFATE HFA (VENTOLIN HFA) 108 (90 BASE) MCG/ACT INHALER    Inhale 2 puffs into the lungs every 6 hours as needed for Wheezing    ALPHA-LIPOIC ACID 300 MG CAPS    Take 300 mg by mouth daily    ASPIRIN EC 81 MG EC TABLET    Take 1 tablet by mouth daily. Start taking next week. Indications: OTC    ATORVASTATIN (LIPITOR) 40 MG TABLET    Take 1 and 1/2 tablets by mouth every evening    BUDESONIDE (PULMICORT FLEXHALER) 180 MCG/ACT AEPB INHALER    Inhale 2 puffs into the lungs 2 times daily    CARBIDOPA-LEVODOPA (SINEMET)  MG PER TABLET    TAKE 1 TABLET BY MOUTH 3 TIMES A DAY    DULOXETINE (CYMBALTA) 60 MG EXTENDED RELEASE CAPSULE    Take 1 capsule by mouth daily    ELIQUIS 2.5 MG TABS TABLET    Take 1 tablet by mouth 2 times daily    FLUTICASONE (FLONASE) 50 MCG/ACT NASAL SPRAY    USE 2 PUFFS IN EACH NOSTRIL DAILY    FUROSEMIDE (LASIX) 40 MG TABLET    Take 1 tablet by mouth daily    GABAPENTIN (NEURONTIN) 100 MG CAPSULE    Take 1 capsule by mouth every evening for 90 days. Sonja Real HANDICAP PLACARD MISC    by Does not apply route. 12/12/14-12/12/19    IPRATROPIUM-ALBUTEROL (DUONEB) 0.5-2.5 (3) MG/3ML SOLN NEBULIZER SOLUTION    Inhale 3 mLs into the lungs every 6 hours as needed for Shortness of Breath DX: J45.40    LAMOTRIGINE (LAMICTAL) 25 MG TABLET    Take 50 mg by mouth 2 times daily     LOSARTAN (COZAAR) 50 MG TABLET    Take 1 tablet by mouth daily    METOPROLOL TARTRATE (LOPRESSOR) 50 MG TABLET    Take 1 tablet by mouth 2 times daily    MONTELUKAST (SINGULAIR) 10 MG TABLET    Take 1 tablet by mouth daily. MULTIPLE VITAMINS-MINERALS (THERAPEUTIC MULTIVITAMIN-MINERALS) TABLET    Take 1 tablet by mouth daily    PROPAFENONE (RYTHMOL) 150 MG TABLET    Take 1 tablet by mouth every 8 hours    SALMETEROL (SEREVENT) 50 MCG/DOSE DISKUS INHALER    Inhale 1 puff by mouth 2 times a day. ALLERGIES:    Levaquin [levofloxacin]; Pyridium [phenazopyridine hcl];  Reglan [metoclopramide hcl]; and Reglan [metoclopramide hcl]    FAMILY HISTORY:       Family History   Problem Relation Age of Onset    Diabetes Mother     Heart Disease Mother     Diabetes Father NECK: Supple. Normal ROM. No pain to palpation of the posterior C-spine. CARDIOVASCULAR: RRR. No Murmer. Intact distal pulses. PULMONARY/CHEST WALL: Effort normal. No tachypnea. Lungs clear to ausculation. ABDOMEN: Normal BS. Soft. Nondistended. No tenderness to palpate. No guarding. /ANORECTAL: Not assessed  MUSKULOSKELETAL: Normal ROM. No acute deformities. No edema. No tenderness to palpate. SKIN: Warm and dry. No rash. NEUROLOGICAL: Alert and oriented x 3. GCS 15. CN II-XII grossly intact. Strength is 5/5 in all extremities and sensation is intact. Normal gait. PSYCHIATRIC: Normal affect        DIAGNOSTICRESULTS:         RADIOLOGY:  All x-ray studies areviewed/reviewed by me. Formal interpretations per the radiologist are as follows:      Ct Head Wo Contrast    Result Date: 5/10/2019  EXAMINATION: CT OF THE HEAD WITHOUT CONTRAST  5/10/2019 3:19 pm TECHNIQUE: CT of the head was performed without the administration of intravenous contrast. Dose modulation, iterative reconstruction, and/or weight based adjustment of the mA/kV was utilized to reduce the radiation dose to as low as reasonably achievable. COMPARISON: December 10, 2018 HISTORY: ORDERING SYSTEM PROVIDED HISTORY: head injury s/p fall TECHNOLOGIST PROVIDED HISTORY: Has a \"code stroke\" or \"stroke alert\" been called? ->No Ordering Physician Provided Reason for Exam: Lost footing today and fell Acuity: Acute Type of Exam: Initial FINDINGS: BRAIN/VENTRICLES: Streak artifact is noted. There is no acute intracranial hemorrhage, mass effect or midline shift. No abnormal extra-axial fluid collection. The gray-white differentiation is maintained. There is no evidence of hydrocephalus. ORBITS: The visualized portion of the orbits demonstrate no acute abnormality. SINUSES: The visualized paranasal sinuses and mastoid air cells demonstrate no acute abnormality. SOFT TISSUES/SKULL:  Posterior scalp hematoma. No underlying fracture or foreign body. Posterior scalp hematoma without underlying fracture or acute CT abnormality in the brain. Ct Cervical Spine Wo Contrast    Result Date: 5/10/2019  EXAMINATION: CT OF THE CERVICAL SPINE WITHOUT CONTRAST 5/10/2019 3:19 pm TECHNIQUE: CT of the cervical spine was performed without the administration of intravenous contrast. Multiplanar reformatted images are provided for review. Dose modulation, iterative reconstruction, and/or weight based adjustment of the mA/kV was utilized to reduce the radiation dose to as low as reasonably achievable. COMPARISON: None. HISTORY: ORDERING SYSTEM PROVIDED HISTORY: fall, head injury TECHNOLOGIST PROVIDED HISTORY: Ordering Physician Provided Reason for Exam: Lost footing today and fell Acuity: Acute Type of Exam: Initial FINDINGS: BONES/ALIGNMENT: No evidence of fracture or subluxation. Status post C5-C6-C7 anterior cervical discectomy and fusion. Hardware intact DEGENERATIVE CHANGES: Mild degenerative disc disease in the remaining unfused cervical spine. Facet arthropathy C3-C4 on the right SOFT TISSUES: There is no prevertebral soft tissue swelling. No acute abnormality of the cervical spine. PROCEDURES:   N/A    CRITICAL CARE TIME:       None      CONSULTS:  None      EMERGENCY DEPARTMENT COURSE and DIFFERENTIAL DIAGNOSIS/MDM:   Vitals:    Vitals:    05/10/19 1442   Pulse: 73   Resp: 16   Temp: 98.9 °F (37.2 °C)   TempSrc: Oral   SpO2: 95%   Weight: 254 lb (115.2 kg)   Height: 5' 6\" (1.676 m)       Patient was given the following medications:  Medications   acetaminophen (TYLENOL) tablet 650 mg (650 mg Oral Given 5/10/19 1519)           I have evaluated this patient in the ED. She describes a mechanical fall secondary to an unsteady Parkinson's gait. She hit her head. She is anticoagulated. CT imaging of the head and cervical spine are done. The patient has a posterior scalp hematoma without underlying fracture or acute CT abnormality in the brain.   CT cervical spine is normal.  She is given Tylenol orally for her headache. She will be discharged to follow-up with primary care. I estimate there is LOW risk for CERVICAL/SKULL/FACIAL FX, SUBARACHNOID HEMORRHAGE, INTRACRANIAL HEMORRHAGE, SUBDURAL HEMATOMA, OR STROKE, thus I consider the discharge disposition reasonable. Mayra Reddy and I have discussed the diagnosis and risks, and we agree with discharging home to follow-up with their primary doctor. We also discussed returning to the Emergency Department immediately if new or worsening symptoms occur. We have discussed the symptoms which are most concerning (e.g., changing or worsening pain, weakness, vomiting, fever) that necessitate immediate return. FINAL IMPRESSION:      1. Injury of head, initial encounter    2. Contusion of scalp, initial encounter    3.  Abrasion of scalp, initial encounter          DISPOSITION/PLAN:   DISPOSITION Decision To Discharge      PATIENT REFERRED TO:  Shayne Tavera MD  [address] [address]  [phone]    Schedule an appointment as soon as possible for a visit         DISCHARGE MEDICATIONS:  New Prescriptions    No medications on file                  (Please note thatportions of this note were completed with a voice recognition program.  Efforts were made to edit the dictations, but occasionally words are mis-transcribed.)    Jimbo Freeman PA-C (electronicallysigned)              [facility] Stern [facility]  05/10/19 0765

## 2019-05-13 ENCOUNTER — HOSPITAL ENCOUNTER (OUTPATIENT)
Age: 79
Discharge: HOME | End: 2019-05-13
Payer: MEDICARE

## 2019-05-13 ENCOUNTER — CARE COORDINATION (OUTPATIENT)
Dept: CARE COORDINATION | Age: 79
End: 2019-05-13

## 2019-05-13 LAB
ALBUMIN SERPL-MCNC: 3.4 G/DL (ref 3.4–5)
ANION GAP SERPL CALCULATED.3IONS-SCNC: 8 MMOL/L (ref 3–16)
BASOPHILS ABSOLUTE: 0 K/UL (ref 0–0.2)
BASOPHILS RELATIVE PERCENT: 0.4 %
BUN BLDV-MCNC: 17 MG/DL (ref 7–20)
CALCIUM SERPL-MCNC: 9.4 MG/DL (ref 8.3–10.6)
CHLORIDE BLD-SCNC: 102 MMOL/L (ref 99–110)
CO2: 32 MMOL/L (ref 21–32)
CREAT SERPL-MCNC: 1.2 MG/DL (ref 0.6–1.2)
EOSINOPHILS ABSOLUTE: 0.3 K/UL (ref 0–0.6)
EOSINOPHILS RELATIVE PERCENT: 2.9 %
GFR AFRICAN AMERICAN: 53
GFR NON-AFRICAN AMERICAN: 44
GLUCOSE BLD-MCNC: 102 MG/DL (ref 70–99)
HCT VFR BLD CALC: 31.9 % (ref 36–48)
HEMOGLOBIN: 10.2 G/DL (ref 12–16)
LYMPHOCYTES ABSOLUTE: 1.5 K/UL (ref 1–5.1)
LYMPHOCYTES RELATIVE PERCENT: 14.5 %
MCH RBC QN AUTO: 27.6 PG (ref 26–34)
MCHC RBC AUTO-ENTMCNC: 32 G/DL (ref 31–36)
MCV RBC AUTO: 86.3 FL (ref 80–100)
MONOCYTES ABSOLUTE: 0.9 K/UL (ref 0–1.3)
MONOCYTES RELATIVE PERCENT: 8.4 %
NEUTROPHILS ABSOLUTE: 7.7 K/UL (ref 1.7–7.7)
NEUTROPHILS RELATIVE PERCENT: 73.8 %
PDW BLD-RTO: 14.2 % (ref 12.4–15.4)
PHOSPHORUS: 3.5 MG/DL (ref 2.5–4.9)
PLATELET # BLD: 256 K/UL (ref 135–450)
PMV BLD AUTO: 9.4 FL (ref 5–10.5)
POTASSIUM SERPL-SCNC: 4.2 MMOL/L (ref 3.5–5.1)
RBC # BLD: 3.7 M/UL (ref 4–5.2)
SODIUM BLD-SCNC: 142 MMOL/L (ref 136–145)
WBC # BLD: 10.4 K/UL (ref 4–11)

## 2019-05-13 PROCEDURE — 36415 COLL VENOUS BLD VENIPUNCTURE: CPT

## 2019-05-13 PROCEDURE — 85025 COMPLETE CBC W/AUTO DIFF WBC: CPT

## 2019-05-13 PROCEDURE — 80069 RENAL FUNCTION PANEL: CPT

## 2019-05-16 ENCOUNTER — HOSPITAL ENCOUNTER (OUTPATIENT)
Dept: GENERAL RADIOLOGY | Age: 79
Discharge: HOME | End: 2019-05-16
Payer: MEDICARE

## 2019-05-16 ENCOUNTER — HOSPITAL ENCOUNTER (OUTPATIENT)
Age: 79
Discharge: HOME | End: 2019-05-16
Payer: MEDICARE

## 2019-05-16 ENCOUNTER — OFFICE VISIT (OUTPATIENT)
Dept: FAMILY MEDICINE CLINIC | Age: 79
End: 2019-05-16
Payer: MEDICARE

## 2019-05-16 VITALS
BODY MASS INDEX: 41.24 KG/M2 | HEART RATE: 65 BPM | TEMPERATURE: 98.2 F | HEIGHT: 66 IN | OXYGEN SATURATION: 96 % | SYSTOLIC BLOOD PRESSURE: 140 MMHG | WEIGHT: 256.6 LBS | RESPIRATION RATE: 16 BRPM | DIASTOLIC BLOOD PRESSURE: 62 MMHG

## 2019-05-16 DIAGNOSIS — J44.9 CHRONIC OBSTRUCTIVE PULMONARY DISEASE, UNSPECIFIED COPD TYPE (HCC): ICD-10-CM

## 2019-05-16 DIAGNOSIS — R06.02 SHORTNESS OF BREATH: ICD-10-CM

## 2019-05-16 DIAGNOSIS — I50.32 CHRONIC DIASTOLIC CONGESTIVE HEART FAILURE (HCC): Primary | ICD-10-CM

## 2019-05-16 DIAGNOSIS — I48.0 PAF (PAROXYSMAL ATRIAL FIBRILLATION) (HCC): ICD-10-CM

## 2019-05-16 DIAGNOSIS — I50.32 CHRONIC DIASTOLIC CONGESTIVE HEART FAILURE (HCC): ICD-10-CM

## 2019-05-16 LAB
BASOPHILS ABSOLUTE: 0 K/UL (ref 0–0.2)
BASOPHILS RELATIVE PERCENT: 0.3 %
EOSINOPHILS ABSOLUTE: 0.1 K/UL (ref 0–0.6)
EOSINOPHILS RELATIVE PERCENT: 0.4 %
HCT VFR BLD CALC: 30.7 % (ref 36–48)
HEMOGLOBIN: 9.7 G/DL (ref 12–16)
LYMPHOCYTES ABSOLUTE: 1.5 K/UL (ref 1–5.1)
LYMPHOCYTES RELATIVE PERCENT: 11.4 %
MCH RBC QN AUTO: 27.2 PG (ref 26–34)
MCHC RBC AUTO-ENTMCNC: 31.5 G/DL (ref 31–36)
MCV RBC AUTO: 86.6 FL (ref 80–100)
MONOCYTES ABSOLUTE: 1 K/UL (ref 0–1.3)
MONOCYTES RELATIVE PERCENT: 7.5 %
NEUTROPHILS ABSOLUTE: 10.8 K/UL (ref 1.7–7.7)
NEUTROPHILS RELATIVE PERCENT: 80.4 %
PDW BLD-RTO: 14.4 % (ref 12.4–15.4)
PLATELET # BLD: 243 K/UL (ref 135–450)
PMV BLD AUTO: 9.3 FL (ref 5–10.5)
PRO-BNP: 4470 PG/ML (ref 0–449)
RBC # BLD: 3.54 M/UL (ref 4–5.2)
WBC # BLD: 13.5 K/UL (ref 4–11)

## 2019-05-16 PROCEDURE — G8427 DOCREV CUR MEDS BY ELIG CLIN: HCPCS | Performed by: PHYSICIAN ASSISTANT

## 2019-05-16 PROCEDURE — G8417 CALC BMI ABV UP PARAM F/U: HCPCS | Performed by: PHYSICIAN ASSISTANT

## 2019-05-16 PROCEDURE — 71046 X-RAY EXAM CHEST 2 VIEWS: CPT

## 2019-05-16 PROCEDURE — 99214 OFFICE O/P EST MOD 30 MIN: CPT | Performed by: PHYSICIAN ASSISTANT

## 2019-05-16 PROCEDURE — 1123F ACP DISCUSS/DSCN MKR DOCD: CPT | Performed by: PHYSICIAN ASSISTANT

## 2019-05-16 PROCEDURE — G8926 SPIRO NO PERF OR DOC: HCPCS | Performed by: PHYSICIAN ASSISTANT

## 2019-05-16 PROCEDURE — G8598 ASA/ANTIPLAT THER USED: HCPCS | Performed by: PHYSICIAN ASSISTANT

## 2019-05-16 PROCEDURE — 1090F PRES/ABSN URINE INCON ASSESS: CPT | Performed by: PHYSICIAN ASSISTANT

## 2019-05-16 PROCEDURE — G8400 PT W/DXA NO RESULTS DOC: HCPCS | Performed by: PHYSICIAN ASSISTANT

## 2019-05-16 PROCEDURE — 1036F TOBACCO NON-USER: CPT | Performed by: PHYSICIAN ASSISTANT

## 2019-05-16 PROCEDURE — 3023F SPIROM DOC REV: CPT | Performed by: PHYSICIAN ASSISTANT

## 2019-05-16 PROCEDURE — 4040F PNEUMOC VAC/ADMIN/RCVD: CPT | Performed by: PHYSICIAN ASSISTANT

## 2019-05-16 ASSESSMENT — ENCOUNTER SYMPTOMS
DIARRHEA: 0
VOMITING: 0
CHEST TIGHTNESS: 1
WHEEZING: 1
SORE THROAT: 0
SHORTNESS OF BREATH: 1
RHINORRHEA: 0
COUGH: 1
ABDOMINAL PAIN: 0
NAUSEA: 0
CONSTIPATION: 0

## 2019-05-16 NOTE — PROGRESS NOTES
2019  Stephanie Garcia (: 1940)  70 y.o. HPI    Patient presents for ER follow up and with acute complaint of shortness of breath. Was evaluated in ED on 5/10 for injuries sustained after a mechanical fall. Patient fell after trying to ambulate without walker. Freddy Radha and hit the back of her head on the ceramic floor. She is anticoagulated for PAF. CT head showed posterior scalp hematoma without underlying fracture or acute intracranial abnormalities. CT cervical spine also without acute findings. Discharge home without further intervention. Has been doing well since discharge, hematoma is  but going down in size. Patient increased sob for the last 2 days.  noted O2 sat of 82% without oxygen last night. Has oxygen at home that she uses for her BIPAP nightly and has been using intermittently throughout the day. O2 wnl in the office today without O2, 93s with conversation. Patient has had fluctuations in weight and worsening LE swelling. Pt also with persistent cough, occasionally productive. Has increased wheezing. Currently on lasix 40 mg daily. Using inhalers and nebulizer without much improvement. Denies fevers, chills.  also notes nose bleed intermittently yesterday. Has not had recurrence today. Review of Systems   Constitutional: Negative for activity change, chills and fever. HENT: Positive for nosebleeds. Negative for congestion, ear pain, rhinorrhea and sore throat. Eyes: Negative for visual disturbance. Respiratory: Positive for cough, chest tightness, shortness of breath and wheezing. Cardiovascular: Positive for leg swelling. Negative for chest pain and palpitations. Gastrointestinal: Negative for abdominal pain, constipation, diarrhea, nausea and vomiting. Genitourinary: Negative for difficulty urinating and dysuria. Musculoskeletal: Negative for arthralgias and myalgias. Skin: Negative for rash.    Neurological: Negative for

## 2019-05-20 DIAGNOSIS — G95.20 CERVICAL SPINAL CORD COMPRESSION (HCC): Chronic | ICD-10-CM

## 2019-05-20 RX ORDER — GABAPENTIN 100 MG/1
100 CAPSULE ORAL EVERY EVENING
Qty: 90 CAPSULE | Refills: 0 | OUTPATIENT
Start: 2019-05-20 | End: 2019-08-18

## 2019-05-20 RX ORDER — APIXABAN 2.5 MG/1
2.5 TABLET, FILM COATED ORAL 2 TIMES DAILY
Qty: 180 TABLET | Refills: 0 | Status: SHIPPED | OUTPATIENT
Start: 2019-05-20 | End: 2019-08-18 | Stop reason: SDUPTHER

## 2019-05-20 NOTE — TELEPHONE ENCOUNTER
Refill Request for - gabapentin/ Eliquis    Last visit- 4/1/19 with PCP    Told to follow up- none given    Pending appointment- 08/05/19    Additional Comments - last refill Gabapentin 2/19/19 #90 with no   Last refill Eliquis 12/13/18 #90 with no

## 2019-05-22 ENCOUNTER — APPOINTMENT (OUTPATIENT)
Dept: GENERAL RADIOLOGY | Age: 79
DRG: 190 | End: 2019-05-22
Payer: MEDICARE

## 2019-05-22 ENCOUNTER — OFFICE VISIT (OUTPATIENT)
Dept: FAMILY MEDICINE CLINIC | Age: 79
End: 2019-05-22
Payer: MEDICARE

## 2019-05-22 ENCOUNTER — HOSPITAL ENCOUNTER (INPATIENT)
Age: 79
LOS: 6 days | Discharge: NURSING, REHAB OR LONG-TERM CARE | DRG: 190 | End: 2019-05-28
Attending: EMERGENCY MEDICINE | Admitting: INTERNAL MEDICINE
Payer: MEDICARE

## 2019-05-22 VITALS
RESPIRATION RATE: 16 BRPM | HEIGHT: 66 IN | OXYGEN SATURATION: 92 % | WEIGHT: 256 LBS | TEMPERATURE: 98.9 F | BODY MASS INDEX: 41.14 KG/M2 | HEART RATE: 66 BPM | SYSTOLIC BLOOD PRESSURE: 126 MMHG | DIASTOLIC BLOOD PRESSURE: 70 MMHG

## 2019-05-22 DIAGNOSIS — R09.02 HYPOXIA: Primary | ICD-10-CM

## 2019-05-22 DIAGNOSIS — R06.2 WHEEZING: ICD-10-CM

## 2019-05-22 DIAGNOSIS — J96.21 ACUTE ON CHRONIC RESPIRATORY FAILURE WITH HYPOXIA (HCC): Primary | ICD-10-CM

## 2019-05-22 DIAGNOSIS — R06.02 SHORTNESS OF BREATH: ICD-10-CM

## 2019-05-22 DIAGNOSIS — R53.83 FATIGUE, UNSPECIFIED TYPE: ICD-10-CM

## 2019-05-22 PROBLEM — J96.01 ACUTE RESPIRATORY FAILURE WITH HYPOXEMIA (HCC): Status: ACTIVE | Noted: 2019-05-22

## 2019-05-22 LAB
A/G RATIO: 1.1 (ref 1.1–2.2)
ALBUMIN SERPL-MCNC: 3.8 G/DL (ref 3.4–5)
ALP BLD-CCNC: 134 U/L (ref 40–129)
ALT SERPL-CCNC: 9 U/L (ref 10–40)
ANION GAP SERPL CALCULATED.3IONS-SCNC: 11 MMOL/L (ref 3–16)
AST SERPL-CCNC: 14 U/L (ref 15–37)
BASOPHILS ABSOLUTE: 0.1 K/UL (ref 0–0.2)
BASOPHILS RELATIVE PERCENT: 0.6 %
BILIRUB SERPL-MCNC: 0.4 MG/DL (ref 0–1)
BUN BLDV-MCNC: 39 MG/DL (ref 7–20)
CALCIUM SERPL-MCNC: 8.8 MG/DL (ref 8.3–10.6)
CHLORIDE BLD-SCNC: 96 MMOL/L (ref 99–110)
CO2: 35 MMOL/L (ref 21–32)
CREAT SERPL-MCNC: 1.8 MG/DL (ref 0.6–1.2)
EKG ATRIAL RATE: 63 BPM
EKG DIAGNOSIS: NORMAL
EKG P AXIS: 9 DEGREES
EKG P-R INTERVAL: 162 MS
EKG Q-T INTERVAL: 426 MS
EKG QRS DURATION: 106 MS
EKG QTC CALCULATION (BAZETT): 435 MS
EKG R AXIS: 50 DEGREES
EKG T AXIS: 77 DEGREES
EKG VENTRICULAR RATE: 63 BPM
EOSINOPHILS ABSOLUTE: 0.2 K/UL (ref 0–0.6)
EOSINOPHILS RELATIVE PERCENT: 1.9 %
GFR AFRICAN AMERICAN: 33
GFR NON-AFRICAN AMERICAN: 27
GLOBULIN: 3.5 G/DL
GLUCOSE BLD-MCNC: 268 MG/DL (ref 70–99)
GLUCOSE BLD-MCNC: 99 MG/DL (ref 70–99)
HCT VFR BLD CALC: 35.4 % (ref 36–48)
HEMOGLOBIN: 11 G/DL (ref 12–16)
LYMPHOCYTES ABSOLUTE: 1.7 K/UL (ref 1–5.1)
LYMPHOCYTES RELATIVE PERCENT: 14.8 %
MCH RBC QN AUTO: 27.1 PG (ref 26–34)
MCHC RBC AUTO-ENTMCNC: 31.2 G/DL (ref 31–36)
MCV RBC AUTO: 86.7 FL (ref 80–100)
MONOCYTES ABSOLUTE: 0.9 K/UL (ref 0–1.3)
MONOCYTES RELATIVE PERCENT: 7.9 %
NEUTROPHILS ABSOLUTE: 8.6 K/UL (ref 1.7–7.7)
NEUTROPHILS RELATIVE PERCENT: 74.8 %
PDW BLD-RTO: 13.9 % (ref 12.4–15.4)
PERFORMED ON: ABNORMAL
PLATELET # BLD: 363 K/UL (ref 135–450)
PMV BLD AUTO: 7.9 FL (ref 5–10.5)
POTASSIUM SERPL-SCNC: 4.6 MMOL/L (ref 3.5–5.1)
PRO-BNP: 483 PG/ML (ref 0–449)
PROCALCITONIN: 0.09 NG/ML (ref 0–0.15)
RBC # BLD: 4.08 M/UL (ref 4–5.2)
SODIUM BLD-SCNC: 142 MMOL/L (ref 136–145)
TOTAL PROTEIN: 7.3 G/DL (ref 6.4–8.2)
TROPONIN: <0.01 NG/ML
WBC # BLD: 11.5 K/UL (ref 4–11)

## 2019-05-22 PROCEDURE — G8427 DOCREV CUR MEDS BY ELIG CLIN: HCPCS | Performed by: PHYSICIAN ASSISTANT

## 2019-05-22 PROCEDURE — 87077 CULTURE AEROBIC IDENTIFY: CPT

## 2019-05-22 PROCEDURE — G8598 ASA/ANTIPLAT THER USED: HCPCS | Performed by: PHYSICIAN ASSISTANT

## 2019-05-22 PROCEDURE — G8400 PT W/DXA NO RESULTS DOC: HCPCS | Performed by: PHYSICIAN ASSISTANT

## 2019-05-22 PROCEDURE — 1200000000 HC SEMI PRIVATE

## 2019-05-22 PROCEDURE — 83880 ASSAY OF NATRIURETIC PEPTIDE: CPT

## 2019-05-22 PROCEDURE — 93005 ELECTROCARDIOGRAM TRACING: CPT | Performed by: EMERGENCY MEDICINE

## 2019-05-22 PROCEDURE — 6360000002 HC RX W HCPCS: Performed by: HOSPITALIST

## 2019-05-22 PROCEDURE — 4040F PNEUMOC VAC/ADMIN/RCVD: CPT | Performed by: PHYSICIAN ASSISTANT

## 2019-05-22 PROCEDURE — 6370000000 HC RX 637 (ALT 250 FOR IP): Performed by: HOSPITALIST

## 2019-05-22 PROCEDURE — 93010 ELECTROCARDIOGRAM REPORT: CPT | Performed by: INTERNAL MEDICINE

## 2019-05-22 PROCEDURE — 87086 URINE CULTURE/COLONY COUNT: CPT

## 2019-05-22 PROCEDURE — 99214 OFFICE O/P EST MOD 30 MIN: CPT | Performed by: PHYSICIAN ASSISTANT

## 2019-05-22 PROCEDURE — 80053 COMPREHEN METABOLIC PANEL: CPT

## 2019-05-22 PROCEDURE — 85025 COMPLETE CBC W/AUTO DIFF WBC: CPT

## 2019-05-22 PROCEDURE — 2500000003 HC RX 250 WO HCPCS: Performed by: HOSPITALIST

## 2019-05-22 PROCEDURE — 71045 X-RAY EXAM CHEST 1 VIEW: CPT

## 2019-05-22 PROCEDURE — 99285 EMERGENCY DEPT VISIT HI MDM: CPT

## 2019-05-22 PROCEDURE — 1036F TOBACCO NON-USER: CPT | Performed by: PHYSICIAN ASSISTANT

## 2019-05-22 PROCEDURE — 94660 CPAP INITIATION&MGMT: CPT

## 2019-05-22 PROCEDURE — 2580000003 HC RX 258: Performed by: HOSPITALIST

## 2019-05-22 PROCEDURE — 2580000003 HC RX 258

## 2019-05-22 PROCEDURE — 6370000000 HC RX 637 (ALT 250 FOR IP): Performed by: EMERGENCY MEDICINE

## 2019-05-22 PROCEDURE — 2700000000 HC OXYGEN THERAPY PER DAY

## 2019-05-22 PROCEDURE — 94761 N-INVAS EAR/PLS OXIMETRY MLT: CPT

## 2019-05-22 PROCEDURE — 1123F ACP DISCUSS/DSCN MKR DOCD: CPT | Performed by: PHYSICIAN ASSISTANT

## 2019-05-22 PROCEDURE — G8417 CALC BMI ABV UP PARAM F/U: HCPCS | Performed by: PHYSICIAN ASSISTANT

## 2019-05-22 PROCEDURE — 84145 PROCALCITONIN (PCT): CPT

## 2019-05-22 PROCEDURE — 84484 ASSAY OF TROPONIN QUANT: CPT

## 2019-05-22 PROCEDURE — 87186 SC STD MICRODIL/AGAR DIL: CPT

## 2019-05-22 PROCEDURE — 81001 URINALYSIS AUTO W/SCOPE: CPT

## 2019-05-22 PROCEDURE — 94644 CONT INHLJ TX 1ST HOUR: CPT

## 2019-05-22 PROCEDURE — 1090F PRES/ABSN URINE INCON ASSESS: CPT | Performed by: PHYSICIAN ASSISTANT

## 2019-05-22 RX ORDER — DEXTROSE MONOHYDRATE 50 MG/ML
100 INJECTION, SOLUTION INTRAVENOUS PRN
Status: DISCONTINUED | OUTPATIENT
Start: 2019-05-22 | End: 2019-05-28 | Stop reason: HOSPADM

## 2019-05-22 RX ORDER — SODIUM CHLORIDE 9 MG/ML
INJECTION, SOLUTION INTRAVENOUS
Status: COMPLETED
Start: 2019-05-22 | End: 2019-05-22

## 2019-05-22 RX ORDER — LAMOTRIGINE 25 MG/1
50 TABLET ORAL 2 TIMES DAILY
Status: DISCONTINUED | OUTPATIENT
Start: 2019-05-22 | End: 2019-05-28 | Stop reason: HOSPADM

## 2019-05-22 RX ORDER — FUROSEMIDE 40 MG/1
40 TABLET ORAL DAILY
Status: DISCONTINUED | OUTPATIENT
Start: 2019-05-23 | End: 2019-05-24

## 2019-05-22 RX ORDER — NICOTINE POLACRILEX 4 MG
15 LOZENGE BUCCAL PRN
Status: DISCONTINUED | OUTPATIENT
Start: 2019-05-22 | End: 2019-05-28 | Stop reason: HOSPADM

## 2019-05-22 RX ORDER — IPRATROPIUM BROMIDE AND ALBUTEROL SULFATE 2.5; .5 MG/3ML; MG/3ML
2 SOLUTION RESPIRATORY (INHALATION) ONCE
Status: COMPLETED | OUTPATIENT
Start: 2019-05-22 | End: 2019-05-22

## 2019-05-22 RX ORDER — M-VIT,TX,IRON,MINS/CALC/FOLIC 27MG-0.4MG
1 TABLET ORAL DAILY
Status: DISCONTINUED | OUTPATIENT
Start: 2019-05-23 | End: 2019-05-28 | Stop reason: HOSPADM

## 2019-05-22 RX ORDER — ASPIRIN 81 MG/1
81 TABLET ORAL DAILY
Status: DISCONTINUED | OUTPATIENT
Start: 2019-05-23 | End: 2019-05-28 | Stop reason: HOSPADM

## 2019-05-22 RX ORDER — MONTELUKAST SODIUM 10 MG/1
10 TABLET ORAL NIGHTLY
Status: DISCONTINUED | OUTPATIENT
Start: 2019-05-22 | End: 2019-05-28 | Stop reason: HOSPADM

## 2019-05-22 RX ORDER — GABAPENTIN 100 MG/1
100 CAPSULE ORAL EVERY EVENING
Status: DISCONTINUED | OUTPATIENT
Start: 2019-05-22 | End: 2019-05-28 | Stop reason: HOSPADM

## 2019-05-22 RX ORDER — PREDNISONE 20 MG/1
40 TABLET ORAL ONCE
Status: COMPLETED | OUTPATIENT
Start: 2019-05-22 | End: 2019-05-22

## 2019-05-22 RX ORDER — FLUTICASONE PROPIONATE 50 MCG
2 SPRAY, SUSPENSION (ML) NASAL DAILY
Status: DISCONTINUED | OUTPATIENT
Start: 2019-05-23 | End: 2019-05-28 | Stop reason: HOSPADM

## 2019-05-22 RX ORDER — SODIUM CHLORIDE 0.9 % (FLUSH) 0.9 %
10 SYRINGE (ML) INJECTION PRN
Status: DISCONTINUED | OUTPATIENT
Start: 2019-05-22 | End: 2019-05-28 | Stop reason: HOSPADM

## 2019-05-22 RX ORDER — DEXTROSE MONOHYDRATE 25 G/50ML
12.5 INJECTION, SOLUTION INTRAVENOUS PRN
Status: DISCONTINUED | OUTPATIENT
Start: 2019-05-22 | End: 2019-05-28 | Stop reason: HOSPADM

## 2019-05-22 RX ORDER — METOPROLOL TARTRATE 50 MG/1
50 TABLET, FILM COATED ORAL 2 TIMES DAILY
Status: DISCONTINUED | OUTPATIENT
Start: 2019-05-22 | End: 2019-05-28 | Stop reason: HOSPADM

## 2019-05-22 RX ORDER — DULOXETIN HYDROCHLORIDE 60 MG/1
60 CAPSULE, DELAYED RELEASE ORAL DAILY
Status: DISCONTINUED | OUTPATIENT
Start: 2019-05-23 | End: 2019-05-28 | Stop reason: HOSPADM

## 2019-05-22 RX ORDER — IPRATROPIUM BROMIDE AND ALBUTEROL SULFATE 2.5; .5 MG/3ML; MG/3ML
1 SOLUTION RESPIRATORY (INHALATION)
Status: DISCONTINUED | OUTPATIENT
Start: 2019-05-23 | End: 2019-05-28 | Stop reason: HOSPADM

## 2019-05-22 RX ORDER — ONDANSETRON 2 MG/ML
4 INJECTION INTRAMUSCULAR; INTRAVENOUS EVERY 6 HOURS PRN
Status: DISCONTINUED | OUTPATIENT
Start: 2019-05-22 | End: 2019-05-28 | Stop reason: HOSPADM

## 2019-05-22 RX ORDER — METHYLPREDNISOLONE SODIUM SUCCINATE 40 MG/ML
40 INJECTION, POWDER, LYOPHILIZED, FOR SOLUTION INTRAMUSCULAR; INTRAVENOUS EVERY 12 HOURS
Status: COMPLETED | OUTPATIENT
Start: 2019-05-22 | End: 2019-05-26

## 2019-05-22 RX ORDER — PROPAFENONE HYDROCHLORIDE 150 MG/1
150 TABLET, FILM COATED ORAL EVERY 8 HOURS SCHEDULED
Status: DISCONTINUED | OUTPATIENT
Start: 2019-05-22 | End: 2019-05-28 | Stop reason: HOSPADM

## 2019-05-22 RX ORDER — FLUTICASONE PROPIONATE 110 UG/1
2 AEROSOL, METERED RESPIRATORY (INHALATION) 2 TIMES DAILY
Status: DISCONTINUED | OUTPATIENT
Start: 2019-05-22 | End: 2019-05-28 | Stop reason: HOSPADM

## 2019-05-22 RX ORDER — SODIUM CHLORIDE 0.9 % (FLUSH) 0.9 %
10 SYRINGE (ML) INJECTION EVERY 12 HOURS SCHEDULED
Status: DISCONTINUED | OUTPATIENT
Start: 2019-05-22 | End: 2019-05-28 | Stop reason: HOSPADM

## 2019-05-22 RX ORDER — ATORVASTATIN CALCIUM 10 MG/1
20 TABLET, FILM COATED ORAL NIGHTLY
Status: DISCONTINUED | OUTPATIENT
Start: 2019-05-22 | End: 2019-05-28 | Stop reason: HOSPADM

## 2019-05-22 RX ADMIN — ATORVASTATIN CALCIUM 20 MG: 10 TABLET, FILM COATED ORAL at 23:30

## 2019-05-22 RX ADMIN — MONTELUKAST 10 MG: 10 TABLET, FILM COATED ORAL at 23:31

## 2019-05-22 RX ADMIN — INSULIN LISPRO 3 UNITS: 100 INJECTION, SOLUTION INTRAVENOUS; SUBCUTANEOUS at 23:54

## 2019-05-22 RX ADMIN — LAMOTRIGINE 50 MG: 25 TABLET ORAL at 23:28

## 2019-05-22 RX ADMIN — DOXYCYCLINE 100 MG: 100 INJECTION, POWDER, LYOPHILIZED, FOR SOLUTION INTRAVENOUS at 23:38

## 2019-05-22 RX ADMIN — APIXABAN 2.5 MG: 2.5 TABLET, FILM COATED ORAL at 23:29

## 2019-05-22 RX ADMIN — CARBIDOPA AND LEVODOPA 1 TABLET: 25; 100 TABLET ORAL at 23:31

## 2019-05-22 RX ADMIN — PREDNISONE 40 MG: 20 TABLET ORAL at 17:23

## 2019-05-22 RX ADMIN — SODIUM CHLORIDE, PRESERVATIVE FREE 10 ML: 5 INJECTION INTRAVENOUS at 23:56

## 2019-05-22 RX ADMIN — METHYLPREDNISOLONE SODIUM SUCCINATE 40 MG: 40 INJECTION, POWDER, FOR SOLUTION INTRAMUSCULAR; INTRAVENOUS at 23:38

## 2019-05-22 RX ADMIN — METOPROLOL TARTRATE 50 MG: 50 TABLET ORAL at 23:31

## 2019-05-22 RX ADMIN — GABAPENTIN 100 MG: 100 CAPSULE ORAL at 23:31

## 2019-05-22 RX ADMIN — IPRATROPIUM BROMIDE AND ALBUTEROL SULFATE 2 AMPULE: .5; 3 SOLUTION RESPIRATORY (INHALATION) at 16:33

## 2019-05-22 RX ADMIN — PROPAFENONE HYDROCHLORIDE 150 MG: 150 TABLET, FILM COATED ORAL at 23:29

## 2019-05-22 RX ADMIN — SODIUM CHLORIDE 250 ML: 9 INJECTION, SOLUTION INTRAVENOUS at 23:38

## 2019-05-22 ASSESSMENT — ENCOUNTER SYMPTOMS
TROUBLE SWALLOWING: 0
CHEST TIGHTNESS: 0
RHINORRHEA: 0
ABDOMINAL PAIN: 0
SHORTNESS OF BREATH: 1
VOMITING: 0
ABDOMINAL PAIN: 0
COUGH: 0
RHINORRHEA: 0
CONSTIPATION: 0
SORE THROAT: 0
VOMITING: 0
SHORTNESS OF BREATH: 1
NAUSEA: 0
DIARRHEA: 0
STRIDOR: 0
COUGH: 1
SORE THROAT: 0
DIARRHEA: 0
WHEEZING: 0

## 2019-05-22 ASSESSMENT — PAIN SCALES - GENERAL: PAINLEVEL_OUTOF10: 0

## 2019-05-22 NOTE — H&P
Hospital Medicine History & Physical      PCP: Gladis Miramontes MD    Date of Admission: 5/22/2019    Date of Service: Pt seen/examined on 5/22/19 and Admitted to Inpatient with expected LOS greater than two midnights due to medical therapy    Chief Complaint:  SOB      History Of Present Illness:      70 y.o. female who presented to [facility] with worsening SOB. Sx started couple weeks ago, saw PCP, CXR with suspected CHF, advised to double her Lasix x 3 days then reeval.  Notes some improvement in SOB with Lasix, incl 10 lb wt decline and less leg swelling. Despite this persistent SOB, ANN. Ongoing cough, difficulty clearing phlegm. Presented to ED, hypoxic on RA, baseline uses BIPAP with 02 qhs alone. Plan to admit for further workup/mgmt.     Past Medical History:          Diagnosis Date    Anemia, unspecified 2010    neg bone marrow    Asthma     Baker's cyst     Blind left eye     Carotid artery stenoses     Carotid stenosis 2002    left    Cervical spinal cord compression ([address] 11/2010    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-sided sciatica 8/9/2016    CKD (chronic kidney disease) stage 3, GFR 30-59 ml/min (HCC)     Community acquired pneumonia of left lower lobe of lung ([address] 9/8/2018    CRI     Detached retina, left     Diverticulosis     DJD (degenerative joint disease)     Edema     chronic    Fatty liver     GERD (gastroesophageal reflux disease)     Herniated lumbar intervertebral disc     Hx stage 2 sacral decubitus ulcer 4/13/2018    Hyperlipidemia     Hypertension     Hypothyroid 1/27/2015    Morbid obesity with BMI of 40.0-44.9, adult ([address] 2/10/2017    BETSY treated with BiPAP     Restless leg syndrome     Restless legs syndrome     Sleep apnea     Tremor, essential 8/16/2010    Type 2 diabetes mellitus without complication (HCC)     Type II or unspecified type diabetes mellitus without mention of complication, not stated as uncontrolled        Past Surgical History:          Procedure Laterality Date    APPENDECTOMY      CAROTID ENDARTERECTOMY      left    CATARACT REMOVAL      CERVICAL FUSION  11/2010    CHOLECYSTECTOMY      CORNEAL TRANSPLANT      EYE SURGERY      HERNIA REPAIR      HYSTERECTOMY      JOINT REPLACEMENT      KNEE SURGERY      replacement x 2    SHOULDER SURGERY      TONSILLECTOMY      TUBAL LIGATION      VITRECTOMY         Medications Prior to Admission:      Prior to Admission medications    Medication Sig Start Date End Date Taking? Authorizing Provider   ELIQUIS 2.5 MG TABS tablet Take 1 tablet by mouth 2 times daily 5/20/19   BIJAN Fuentes   carbidopa-levodopa (SINEMET)  MG per tablet Take  tablets by mouth 7/30/18   Historical Provider, MD   propafenone (RYTHMOL) 150 MG tablet Take 1 tablet by mouth every 8 hours 5/6/19   MAHAD Cobb CNP   losartan (COZAAR) 50 MG tablet Take 1 tablet by mouth daily 4/29/19   BIJAN Fuentes   metoprolol tartrate (LOPRESSOR) 50 MG tablet Take 1 tablet by mouth 2 times daily 4/29/19   BIJAN Fuentes   DULoxetine (CYMBALTA) 60 MG extended release capsule Take 1 capsule by mouth daily 4/22/19   BIJAN Fuentes   gabapentin (NEURONTIN) 100 MG capsule Take 1 capsule by mouth every evening for 90 days. . 2/19/19 5/20/19  Dylan Le MD   ipratropium-albuterol (DUONEB) 0.5-2.5 (3) MG/3ML SOLN nebulizer solution Inhale 3 mLs into the lungs every 6 hours as needed for Shortness of Breath DX: J45.40 2/6/19   BIJAN Fuentes   budesonide (PULMICORT [facility]) 180 MCG/ACT AEPB inhaler Inhale 2 puffs into the lungs 2 times daily 1/9/19   BIJAN Fuentes   furosemide (LASIX) 40 MG tablet Take 1 tablet by mouth daily 10/4/18   MAHAD Hernandez CNP   carbidopa-levodopa (SINEMET)  MG per tablet TAKE 1 TABLET BY MOUTH 3 TIMES A DAY 7/30/18   Genny Martino MD   atorvastatin (LIPITOR) 40 MG tablet Take 1 and 1/2 tablets by mouth every evening 7/30/18   Vonda Martino MD   montelukast (SINGULAIR) 10 MG tablet Take 1 tablet by mouth daily. 7/30/18   Vonda Martino MD   albuterol sulfate HFA (VENTOLIN HFA) 108 (90 Base) MCG/ACT inhaler Inhale 2 puffs into the lungs every 6 hours as needed for Wheezing 7/30/18   David Biswas MD   fluticasone (FLONASE) 50 MCG/ACT nasal spray USE 2 PUFFS IN EACH NOSTRIL DAILY 7/24/18   Vonda Martino MD   salmeterol (SEREVENT) 50 MCG/DOSE diskus inhaler Inhale 1 puff by mouth 2 times a day. 5/11/18   David Biswas MD   lamoTRIgine (LAMICTAL) 25 MG tablet Take 50 mg by mouth 2 times daily     Historical Provider, MD   Multiple Vitamins-Minerals (THERAPEUTIC MULTIVITAMIN-MINERALS) tablet Take 1 tablet by mouth daily    Historical Provider, MD   Alpha-Lipoic Acid 300 MG CAPS Take 300 mg by mouth daily    Historical Provider, MD   Handicap Placard MISC by Does not apply route. 12/12/14-12/12/19 12/12/14   Vonda Martino MD   solifenacin (VESICARE) 5 MG tablet Take 1 tablet by mouth daily. 6/25/13 10/29/13  David Biswas MD   aspirin EC 81 MG EC tablet Take 1 tablet by mouth daily. Start taking next week. Indications: OTC 2/25/13   Brendan Trammell MD       Allergies:  Levaquin [levofloxacin]; Pyridium [phenazopyridine hcl]; Reglan [metoclopramide hcl]; and Reglan [metoclopramide hcl]    Social History:      The patient currently lives at home    TOBACCO:   reports that she has never smoked. She has never used smokeless tobacco.  ETOH:   reports that she does not drink alcohol. Family History:      Reviewed in detail and Positive as follows:        Problem Relation Age of Onset    Diabetes Mother     Heart Disease Mother     Diabetes Father     Heart Disease Father     Diabetes Sister        REVIEW OF SYSTEMS:   Pertinent positives as noted in the HPI. All other systems reviewed and negative.     PHYSICAL EXAM PERFORMED:    BP (!) 154/53   Pulse 64   Temp 97.8 °F (36.6 °C) (Oral)   Resp 19   Wt 241 lb (109.3 kg)   SpO2 94%   BMI 38.90 kg/m²     General appearance:  No apparent distress, appears stated age and cooperative. HEENT:  Normal cephalic, atraumatic without obvious deformity. Pupils equal, round, and reactive to light. Extra ocular muscles intact. Conjunctivae/corneas clear. Neck: Supple, with full range of motion. No jugular venous distention. Trachea midline. Respiratory:  Faint expiratory wheezes, mainly upper lung fields, scant rhonchi  Cardiovascular:  Regular rate and rhythm with normal S1/S2 without murmurs, rubs or gallops. Abdomen: Soft, non-tender, non-distended with normal bowel sounds. Musculoskeletal:  No clubbing, cyanosis. Trace LE edema bilaterally. Full range of motion without deformity. Skin: Skin color, texture, turgor normal.  No rashes or lesions. Neurologic:  Neurovascularly intact without any focal sensory/motor deficits. Psychiatric:  Alert and oriented, thought content appropriate, normal insight  Capillary Refill: Brisk,< 3 seconds   Peripheral Pulses: +2 palpable, equal bilaterally       Labs:     Recent Labs     05/22/19  1536   WBC 11.5*   HGB 11.0*   HCT 35.4*        Recent Labs     05/22/19  1536      K 4.6   CL 96*   CO2 35*   BUN 39*   CREATININE 1.8*   CALCIUM 8.8     Recent Labs     05/22/19  1536   AST 14*   ALT 9*   BILITOT 0.4   ALKPHOS 134*     No results for input(s): INR in the last 72 hours. Recent Labs     05/22/19  1536   TROPONINI <0.01       Urinalysis:      Lab Results   Component Value Date    NITRU Negative 12/10/2018    WBCUA 0-2 09/08/2018    BACTERIA Rare 09/08/2018    RBCUA None seen 09/08/2018    BLOODU Negative 12/10/2018    SPECGRAV 1.010 12/10/2018    GLUCOSEU Negative 12/10/2018       Radiology:     XR CHEST PORTABLE   Final Result   No evidence of acute cardiopulmonary disease. ASSESSMENT:    1. Acute hypoxic respiratory failure  2. Acute bronchitis, asthma exacerbation  3. Leukocytosis  4.  Acute on chr kidney injury  5. Parkinson's disease  6. Atrial fibrillation  7. Chr diastolic HF, hx of mild to moder MR    PLAN:    Admit to med surg    AM labs. Check PCT, check sputum cx, viral PCR  Sched nebs, IV Solumedrol, empiric IV Doxycycline pending PCT  Cont home inhalers, Flonase, Singulair    Cont home Eliquis, Propafenone, Metoprolol  Mild rise in creat, cont Lasix, hold home ARB, reeval in am    Cont home Sinemet    DVT Prophylaxis: Eliquis  Diet: Gen diet  Code Status: Full    PT/OT Eval Status: None    Dispo - Home in 2 days    Discussed with pt,  at bedside, RN, ED team     Faustina Waller MD    Thank you David Das MD for the opportunity to be involved in this patient's care. If you have any questions or concerns please feel free to contact me at [phone].

## 2019-05-22 NOTE — PROGRESS NOTES
2019  Seema Flores (: 1940)  70 y.o. HPI    Patient presents for evaluation of shortness of breath. Patient in office last week with similar complaint, elevated BNP to 4000, mild white count on cbc (thought to be due to recent steroid used), CXR negative for infection. At that time lasix increased x 3 days then to return to normal dose. Patient reports that initially had improvement in \"smothering\" sensation, has lost weight and, improvement in BLE. However, still with decreased O2. Patient reports that still with increased O2 requirement. Having to wear home O2 throughout he day, desats to 70s-80s with exertion. Endorses mucous, nonproductive cough. Denies wheezing and urinary symptoms. Denies chest pain, fever. Review of Systems   Constitutional: Positive for fatigue. Negative for activity change, chills and fever. HENT: Negative for congestion, ear pain, rhinorrhea and sore throat. Eyes: Negative for visual disturbance. Respiratory: Positive for cough and shortness of breath. Cardiovascular: Negative for chest pain and palpitations. Gastrointestinal: Negative for abdominal pain, constipation, diarrhea, nausea and vomiting. Genitourinary: Negative for difficulty urinating and dysuria. Musculoskeletal: Negative for arthralgias and myalgias. Skin: Negative for rash. Neurological: Negative for dizziness, weakness and numbness. Psychiatric/Behavioral: Negative for sleep disturbance. Allergies, past medical history, family history, and social history reviewed and unchanged from previous encounter. No current facility-administered medications for this visit. No current outpatient medications on file.      Facility-Administered Medications Ordered in Other Visits   Medication Dose Route Frequency Provider Last Rate Last Dose    [START ON 2019] aspirin EC tablet 81 mg  81 mg Oral Daily Corrine Hernandez MD        atorvastatin (LIPITOR) tablet 20 mg 20 mg Oral Nightly Kayleigh Juárez MD        fluticasone [facility] HFA) 110 MCG/ACT inhaler 2 puff  2 puff Inhalation BID Kayleigh Juárez MD        carbidopa-levodopa (SINEMET)  MG per tablet 1 tablet  1 tablet Oral TID Kayleigh Juárez MD        [START ON 5/23/2019] DULoxetine (CYMBALTA) extended release capsule 60 mg  60 mg Oral Daily Kayleigh Juárez MD        apixaban [place]) tablet 2.5 mg  2.5 mg Oral BID Kayleigh Juárez MD        [START ON 5/23/2019] fluticasone (FLONASE) 50 MCG/ACT nasal spray 2 spray  2 spray Each Nostril Daily Kayleigh Juárez MD        [START ON 5/23/2019] furosemide (LASIX) tablet 40 mg  40 mg Oral Daily Kayleigh Juárez MD        gabapentin (NEURONTIN) capsule 100 mg  100 mg Oral QPM Kayleigh Juárez MD        [START ON 5/23/2019] ipratropium-albuterol (DUONEB) nebulizer solution 3 mL  1 vial Inhalation Q4H WA Kayleigh Juárez MD        lamoTRIgine (LAMICTAL) tablet 50 mg  50 mg Oral BID Kayleigh Juárez MD        metoprolol tartrate (LOPRESSOR) tablet 50 mg  50 mg Oral BID Kayleigh Juárez MD        montelukast (SINGULAIR) tablet 10 mg  10 mg Oral Nightly Kayleigh Juárez MD        [START ON 5/23/2019] therapeutic multivitamin-minerals 1 tablet  1 tablet Oral Daily Kayleigh Juárez MD        propafenone [facility]) tablet 150 mg  150 mg Oral 3 times per day Kayleigh Juárez MD       [facility] ON 5/23/2019] olodaterol (STRIVERDI RESPIMAT) inhaler 2 puff  2 puff Inhalation Daily Kayleigh Juárez MD        methylPREDNISolone sodium (SOLU-MEDROL) injection 40 mg  40 mg Intravenous Q12H Kayleigh Juárez MD        doxycycline (VIBRAMYCIN) 100 mg in dextrose 5 % 100 mL IVPB  100 mg Intravenous Q12H Kayleigh Juárez MD        sodium chloride flush 0.9 % injection 10 mL  10 mL Intravenous 2 times per day Kayleigh Juárez MD        sodium chloride flush 0.9 % injection 10 mL  10 mL Intravenous PRN Elie Claudio MD        magnesium hydroxide (MILK OF MAGNESIA) 400 MG/5ML suspension 30 mL  30 mL Oral Daily PRN Elie Claudio MD        ondansetron [facility]) injection 4 mg  4 mg Intravenous Q6H PRN Elie Claudio MD        glucose (GLUTOSE) 40 % oral gel 15 g  15 g Oral PRN Elie Claudio MD        dextrose 50 % solution 12.5 g  12.5 g Intravenous PRN Elie Claudio MD        glucagon (rDNA) injection 1 mg  1 mg Intramuscular PRN Elie Claudio MD        dextrose 5 % solution  100 mL/hr Intravenous PRN Elie Claudio MD        [START ON 5/23/2019] insulin lispro (HUMALOG) injection vial 0-12 Units  0-12 Units Subcutaneous TID WC Elie Claudio MD        insulin lispro (HUMALOG) injection vial 0-6 Units  0-6 Units Subcutaneous Nightly Elie Claudio MD           Vitals:    05/22/19 1407 05/22/19 1413   BP: (!) 152/62 126/70   Site: Right Upper Arm Left Upper Arm   Position: Sitting Sitting   Cuff Size: Large Adult Large Adult   Pulse: 66    Resp: 16    Temp: 98.9 °F (37.2 °C)    SpO2: 92%    Weight: 256 lb (116.1 kg)    Height: 5' 6\" (1.676 m)      Estimated body mass index is 41.32 kg/m² as calculated from the following:    Height as of this encounter: 5' 6\" (1.676 m). Weight as of this encounter: 256 lb (116.1 kg). Physical Exam   Constitutional: She is oriented to person, place, and time. She appears well-developed and well-nourished. No distress. HENT:   Head: Normocephalic and atraumatic. Eyes: Pupils are equal, round, and reactive to light. Conjunctivae and EOM are normal.   Neck: Neck supple. Cardiovascular: Normal rate, regular rhythm and normal heart sounds. No murmur heard. Pulmonary/Chest: She has no wheezes. Increased respiratory effort. Decreased breath sounds bilaterally, no appreciated crackles. Abdominal: Soft. Bowel sounds are normal. There is no tenderness. Musculoskeletal: She exhibits no edema.

## 2019-05-22 NOTE — ED NOTES
[email or web address]  Re: admit.  SOB, hypoxia, wheezing per @[email or web address] returned [phone]  05/22/19 9413

## 2019-05-22 NOTE — ED PROVIDER NOTES
[facility]  ED  eMERGENCYdEPARTMENT eNCOUnter      Pt Name: Yaron Urban  MRN: [id number]  [place] 1940  Date of evaluation: 5/22/2019  Chris Roman MD    CHIEF COMPLAINT       Chief Complaint   Patient presents with    Shortness of Breath     wears oxygen at night only, has had to wear oxygen 24/7 the past few days,          HISTORY OF PRESENT ILLNESS   (Location/Symptom, Timing/Onset,Context/Setting, Quality, Duration, Modifying Factors, Severity)  Note limiting factors. Yaron Urban is a 70 y.o. female who presents to the emergency department for shortness of breath. Per  patient has been short of breath x 2-3 weeks requiring supplemental oxygen. [employer] states she normally only uses oxygen at night and is now needing to use it constantly. Was seen at PCP today and found SpO2 to be 79% on RA. Patient denies cough, reports she has 'phlegm in my throat' and hoarse voice. HPI    Nursing Notes were reviewed. REVIEW OF SYSTEMS    (2-9 systems for level 4, 10 or more for level 5)     Review of Systems   Constitutional: Positive for fatigue. Negative for activity change, appetite change, diaphoresis and fever. HENT: Negative for congestion, rhinorrhea, sore throat and trouble swallowing. Respiratory: Positive for shortness of breath. Negative for cough, chest tightness, wheezing and stridor. Cardiovascular: Negative for chest pain and palpitations. Gastrointestinal: Negative for abdominal pain, diarrhea and vomiting. Genitourinary: Negative for dysuria and flank pain. Skin: Negative for rash and wound. Neurological: Positive for weakness (generalized). Negative for dizziness, light-headedness, numbness and headaches. Except as noted above the remainder of the review of systems was reviewedand negative.        PAST MEDICAL HISTORY     Past Medical History:   Diagnosis Date    Anemia, unspecified 2010    neg bone marrow    Asthma     Baker's cyst  Blind left eye     Carotid artery stenoses     Carotid stenosis 2002    left    Cervical spinal cord compression ([facility] 11/2010    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-sided sciatica 8/9/2016    CKD (chronic kidney disease) stage 3, GFR 30-59 ml/min ([facility])     Community acquired pneumonia of left lower lobe of lung ([facility] 9/8/2018    CRI     Detached retina, left     Diverticulosis     DJD (degenerative joint disease)     Edema     chronic    Fatty liver     GERD (gastroesophageal reflux disease)     Herniated lumbar intervertebral disc     Hx stage 2 sacral decubitus ulcer 4/13/2018    Hyperlipidemia     Hypertension     Hypothyroid 1/27/2015    Morbid obesity with BMI of 40.0-44.9, adult [facility] 2/10/2017    BETSY treated with BiPAP     Restless leg syndrome     Restless legs syndrome     Sleep apnea     Tremor, essential 8/16/2010    Type 2 diabetes mellitus without complication ([facility])     Type II or unspecified type diabetes mellitus without mention of complication, not stated as uncontrolled          SURGICAL HISTORY       Past Surgical History:   Procedure Laterality Date    APPENDECTOMY      CAROTID ENDARTERECTOMY      left    CATARACT REMOVAL      CERVICAL FUSION  11/2010    CHOLECYSTECTOMY      CORNEAL TRANSPLANT      EYE SURGERY      HERNIA REPAIR      HYSTERECTOMY      JOINT REPLACEMENT      KNEE SURGERY      replacement x 2    SHOULDER SURGERY      TONSILLECTOMY      TUBAL LIGATION      VITRECTOMY           CURRENT MEDICATIONS       Previous Medications    ALBUTEROL SULFATE HFA (VENTOLIN HFA) 108 (90 BASE) MCG/ACT INHALER    Inhale 2 puffs into the lungs every 6 hours as needed for Wheezing    ALPHA-LIPOIC ACID 300 MG CAPS    Take 300 mg by mouth daily    ASPIRIN EC 81 MG EC TABLET    Take 1 tablet by mouth daily. Start taking next week.   Indications: OTC    ATORVASTATIN (LIPITOR) 40 MG TABLET    Take 1 and 1/2 tablets by mouth every evening    BUDESONIDE Number of children: None    Years of education: None    Highest education level: None   Occupational History    None   Social Needs    Financial resource strain: None    Food insecurity:     Worry: None     Inability: None    Transportation needs:     Medical: None     Non-medical: None   Tobacco Use    Smoking status: Never Smoker    Smokeless tobacco: Never Used   Substance and Sexual Activity    Alcohol use: No     Alcohol/week: 0.0 oz    Drug use: No    Sexual activity: Never   Lifestyle    Physical activity:     Days per week: None     Minutes per session: None    Stress: None   Relationships    Social connections:     Talks on phone: None     Gets together: None     Attends Yarsanism service: None     Active member of club or organization: None     Attends meetings of clubs or organizations: None     Relationship status: None    Intimate partner violence:     Fear of current or ex partner: None     Emotionally abused: None     Physically abused: None     Forced sexual activity: None   Other Topics Concern    None   Social History Narrative    ** Merged History Encounter **            SCREENINGS             PHYSICAL EXAM    (up to 7 for level 4, 8 ormore for level 5)     ED Triage Vitals [05/22/19 1525]   BP Temp Temp Source Pulse Resp SpO2 Height Weight   (!) 154/53 97.8 °F (36.6 °C) Oral 64 16 98 % -- 241 lb (109.3 kg)       Physical Exam   Constitutional: She is oriented to person, place, and time. Vital signs are normal. She appears well-developed and well-nourished. She is cooperative. Non-toxic appearance. She does not have a sickly appearance. She does not appear ill. No distress. Sitting in bed comfortably, speaking in full sentences, following verbal commands appropriately. Not in acute distress     HENT:   Head: Normocephalic and atraumatic. Mouth/Throat: Oropharynx is clear and moist.   Eyes: Pupils are equal, round, and reactive to light.  Conjunctivae and EOM are normal.   Neck: of 1.8   -Patient was given prednisone and duoneb treatment in the ED   -On evaluation patient was on 3 L of supplemental oxygen. And it down and saw the patient's oxygen saturation decreased to 89% for returning up back up to 2L   -Plan for admission for further workup and treatment for sob, hypoxia was discussed with patient and family. Patient and family in agreement with plan and have nofurther questions/concerns      REASSESSMENT      Well appearing, non toxic, alert, oriented speaking in full sentences and hemodynamically stable upon admission      CRITICAL CARE TIME   Total Critical Care time was 0 minutes, excluding separately reportableprocedures. There was a high probability of clinicallysignificant/life threatening deterioration in the patient's condition which required my urgent intervention. CONSULTS:  IP CONSULT TO HOSPITALIST    PROCEDURES:  Unless otherwise noted below, none     Procedures    FINAL IMPRESSION      1. Hypoxia    2. Shortness of breath    3. Wheezing          DISPOSITION/PLAN   DISPOSITION        PATIENT REFERREDTO:  No follow-up provider specified.     DISCHARGE MEDICATIONS:  New Prescriptions    No medications on file          (Please note that portions of this note were completed with a voice recognition program.  Efforts were made to edit the dictations but occasionally wordsare mis-transcribed.)    Keely Taylor MD (electronically signed)  Attending Emergency Physician            Keely Taylor MD  05/22/19 0144

## 2019-05-23 LAB
ANION GAP SERPL CALCULATED.3IONS-SCNC: 10 MMOL/L (ref 3–16)
BACTERIA: ABNORMAL /HPF
BASOPHILS ABSOLUTE: 0 K/UL (ref 0–0.2)
BASOPHILS RELATIVE PERCENT: 0.2 %
BILIRUBIN URINE: NEGATIVE
BLOOD, URINE: NEGATIVE
BUN BLDV-MCNC: 45 MG/DL (ref 7–20)
CALCIUM SERPL-MCNC: 8.9 MG/DL (ref 8.3–10.6)
CHLORIDE BLD-SCNC: 94 MMOL/L (ref 99–110)
CLARITY: ABNORMAL
CO2: 32 MMOL/L (ref 21–32)
COLOR: YELLOW
CREAT SERPL-MCNC: 1.8 MG/DL (ref 0.6–1.2)
CREATININE URINE: 70.6 MG/DL (ref 28–259)
EOSINOPHILS ABSOLUTE: 0 K/UL (ref 0–0.6)
EOSINOPHILS RELATIVE PERCENT: 0 %
EPITHELIAL CELLS, UA: ABNORMAL /HPF
GFR AFRICAN AMERICAN: 33
GFR NON-AFRICAN AMERICAN: 27
GLUCOSE BLD-MCNC: 139 MG/DL (ref 70–99)
GLUCOSE BLD-MCNC: 149 MG/DL (ref 70–99)
GLUCOSE BLD-MCNC: 153 MG/DL (ref 70–99)
GLUCOSE BLD-MCNC: 158 MG/DL (ref 70–99)
GLUCOSE BLD-MCNC: 230 MG/DL (ref 70–99)
GLUCOSE URINE: NEGATIVE MG/DL
HCT VFR BLD CALC: 34.7 % (ref 36–48)
HEMOGLOBIN: 10.9 G/DL (ref 12–16)
KETONES, URINE: ABNORMAL MG/DL
LEUKOCYTE ESTERASE, URINE: ABNORMAL
LYMPHOCYTES ABSOLUTE: 0.8 K/UL (ref 1–5.1)
LYMPHOCYTES RELATIVE PERCENT: 6.4 %
MAGNESIUM: 2.7 MG/DL (ref 1.8–2.4)
MCH RBC QN AUTO: 27 PG (ref 26–34)
MCHC RBC AUTO-ENTMCNC: 31.4 G/DL (ref 31–36)
MCV RBC AUTO: 86.2 FL (ref 80–100)
MICROSCOPIC EXAMINATION: YES
MONOCYTES ABSOLUTE: 0.1 K/UL (ref 0–1.3)
MONOCYTES RELATIVE PERCENT: 0.7 %
NEUTROPHILS ABSOLUTE: 11.2 K/UL (ref 1.7–7.7)
NEUTROPHILS RELATIVE PERCENT: 92.7 %
NITRITE, URINE: POSITIVE
PDW BLD-RTO: 13.8 % (ref 12.4–15.4)
PERFORMED ON: ABNORMAL
PH UA: 5.5 (ref 5–8)
PLATELET # BLD: 321 K/UL (ref 135–450)
PMV BLD AUTO: 8.1 FL (ref 5–10.5)
POTASSIUM REFLEX MAGNESIUM: 5.4 MMOL/L (ref 3.5–5.1)
PROTEIN PROTEIN: 25 MG/DL
PROTEIN UA: 30 MG/DL
PROTEIN/CREAT RATIO: 0.4 MG/DL
RBC # BLD: 4.02 M/UL (ref 4–5.2)
RBC UA: ABNORMAL /HPF (ref 0–2)
REPORT: NORMAL
RESPIRATORY PANEL PCR: NORMAL
SODIUM BLD-SCNC: 136 MMOL/L (ref 136–145)
SODIUM URINE: 33 MMOL/L
SPECIFIC GRAVITY UA: 1.02 (ref 1–1.03)
URINE REFLEX TO CULTURE: YES
URINE TYPE: ABNORMAL
UROBILINOGEN, URINE: 0.2 E.U./DL
WBC # BLD: 12.1 K/UL (ref 4–11)
WBC UA: >100 /HPF (ref 0–5)

## 2019-05-23 PROCEDURE — 84156 ASSAY OF PROTEIN URINE: CPT

## 2019-05-23 PROCEDURE — 85025 COMPLETE CBC W/AUTO DIFF WBC: CPT

## 2019-05-23 PROCEDURE — 94640 AIRWAY INHALATION TREATMENT: CPT

## 2019-05-23 PROCEDURE — 97116 GAIT TRAINING THERAPY: CPT

## 2019-05-23 PROCEDURE — 94660 CPAP INITIATION&MGMT: CPT

## 2019-05-23 PROCEDURE — 94669 MECHANICAL CHEST WALL OSCILL: CPT

## 2019-05-23 PROCEDURE — 87633 RESP VIRUS 12-25 TARGETS: CPT

## 2019-05-23 PROCEDURE — 87486 CHLMYD PNEUM DNA AMP PROBE: CPT

## 2019-05-23 PROCEDURE — 1200000000 HC SEMI PRIVATE

## 2019-05-23 PROCEDURE — 87581 M.PNEUMON DNA AMP PROBE: CPT

## 2019-05-23 PROCEDURE — 87070 CULTURE OTHR SPECIMN AEROBIC: CPT

## 2019-05-23 PROCEDURE — 87798 DETECT AGENT NOS DNA AMP: CPT

## 2019-05-23 PROCEDURE — 2500000003 HC RX 250 WO HCPCS: Performed by: HOSPITALIST

## 2019-05-23 PROCEDURE — 80048 BASIC METABOLIC PNL TOTAL CA: CPT

## 2019-05-23 PROCEDURE — 97535 SELF CARE MNGMENT TRAINING: CPT

## 2019-05-23 PROCEDURE — 83735 ASSAY OF MAGNESIUM: CPT

## 2019-05-23 PROCEDURE — 6370000000 HC RX 637 (ALT 250 FOR IP): Performed by: HOSPITALIST

## 2019-05-23 PROCEDURE — 2700000000 HC OXYGEN THERAPY PER DAY

## 2019-05-23 PROCEDURE — 84300 ASSAY OF URINE SODIUM: CPT

## 2019-05-23 PROCEDURE — 82570 ASSAY OF URINE CREATININE: CPT

## 2019-05-23 PROCEDURE — 97166 OT EVAL MOD COMPLEX 45 MIN: CPT

## 2019-05-23 PROCEDURE — 6370000000 HC RX 637 (ALT 250 FOR IP): Performed by: INTERNAL MEDICINE

## 2019-05-23 PROCEDURE — 2580000003 HC RX 258: Performed by: HOSPITALIST

## 2019-05-23 PROCEDURE — 36415 COLL VENOUS BLD VENIPUNCTURE: CPT

## 2019-05-23 PROCEDURE — 97162 PT EVAL MOD COMPLEX 30 MIN: CPT

## 2019-05-23 PROCEDURE — 87205 SMEAR GRAM STAIN: CPT

## 2019-05-23 PROCEDURE — 94761 N-INVAS EAR/PLS OXIMETRY MLT: CPT

## 2019-05-23 PROCEDURE — 6360000002 HC RX W HCPCS: Performed by: HOSPITALIST

## 2019-05-23 RX ORDER — LIDOCAINE 50 MG/G
1 OINTMENT TOPICAL PRN
COMMUNITY
End: 2020-12-16 | Stop reason: CLARIF

## 2019-05-23 RX ORDER — DOXYCYCLINE HYCLATE 100 MG
100 TABLET ORAL EVERY 12 HOURS SCHEDULED
Status: DISCONTINUED | OUTPATIENT
Start: 2019-05-23 | End: 2019-05-28 | Stop reason: HOSPADM

## 2019-05-23 RX ADMIN — SODIUM CHLORIDE, PRESERVATIVE FREE 10 ML: 5 INJECTION INTRAVENOUS at 21:29

## 2019-05-23 RX ADMIN — METHYLPREDNISOLONE SODIUM SUCCINATE 40 MG: 40 INJECTION, POWDER, FOR SOLUTION INTRAMUSCULAR; INTRAVENOUS at 08:54

## 2019-05-23 RX ADMIN — LAMOTRIGINE 50 MG: 25 TABLET ORAL at 21:28

## 2019-05-23 RX ADMIN — PROPAFENONE HYDROCHLORIDE 150 MG: 150 TABLET, FILM COATED ORAL at 14:17

## 2019-05-23 RX ADMIN — Medication 1 TABLET: at 08:54

## 2019-05-23 RX ADMIN — MONTELUKAST 10 MG: 10 TABLET, FILM COATED ORAL at 21:29

## 2019-05-23 RX ADMIN — INSULIN LISPRO 2 UNITS: 100 INJECTION, SOLUTION INTRAVENOUS; SUBCUTANEOUS at 17:24

## 2019-05-23 RX ADMIN — CARBIDOPA AND LEVODOPA 1 TABLET: 25; 100 TABLET ORAL at 14:17

## 2019-05-23 RX ADMIN — PROPAFENONE HYDROCHLORIDE 150 MG: 150 TABLET, FILM COATED ORAL at 21:28

## 2019-05-23 RX ADMIN — DOXYCYCLINE 100 MG: 100 INJECTION, POWDER, LYOPHILIZED, FOR SOLUTION INTRAVENOUS at 08:54

## 2019-05-23 RX ADMIN — APIXABAN 2.5 MG: 2.5 TABLET, FILM COATED ORAL at 08:55

## 2019-05-23 RX ADMIN — ATORVASTATIN CALCIUM 20 MG: 10 TABLET, FILM COATED ORAL at 21:31

## 2019-05-23 RX ADMIN — DOXYCYCLINE HYCLATE 100 MG: 100 TABLET, COATED ORAL at 11:49

## 2019-05-23 RX ADMIN — GABAPENTIN 100 MG: 100 CAPSULE ORAL at 17:23

## 2019-05-23 RX ADMIN — APIXABAN 2.5 MG: 2.5 TABLET, FILM COATED ORAL at 21:28

## 2019-05-23 RX ADMIN — FUROSEMIDE 40 MG: 40 TABLET ORAL at 08:55

## 2019-05-23 RX ADMIN — METOPROLOL TARTRATE 50 MG: 50 TABLET ORAL at 21:29

## 2019-05-23 RX ADMIN — METOPROLOL TARTRATE 50 MG: 50 TABLET ORAL at 08:54

## 2019-05-23 RX ADMIN — IPRATROPIUM BROMIDE AND ALBUTEROL SULFATE 3 ML: .5; 3 SOLUTION RESPIRATORY (INHALATION) at 16:05

## 2019-05-23 RX ADMIN — Medication 2 PUFF: at 08:18

## 2019-05-23 RX ADMIN — LAMOTRIGINE 50 MG: 25 TABLET ORAL at 08:54

## 2019-05-23 RX ADMIN — OLODATEROL RESPIMAT INHALATION SPRAY 2 PUFF: 2.5 SPRAY, METERED RESPIRATORY (INHALATION) at 08:18

## 2019-05-23 RX ADMIN — SODIUM CHLORIDE, PRESERVATIVE FREE 10 ML: 5 INJECTION INTRAVENOUS at 08:55

## 2019-05-23 RX ADMIN — DOXYCYCLINE HYCLATE 100 MG: 100 TABLET, COATED ORAL at 21:29

## 2019-05-23 RX ADMIN — ASPIRIN 81 MG: 81 TABLET, COATED ORAL at 08:55

## 2019-05-23 RX ADMIN — CARBIDOPA AND LEVODOPA 1 TABLET: 25; 100 TABLET ORAL at 21:29

## 2019-05-23 RX ADMIN — IPRATROPIUM BROMIDE AND ALBUTEROL SULFATE 3 ML: .5; 3 SOLUTION RESPIRATORY (INHALATION) at 12:09

## 2019-05-23 RX ADMIN — DULOXETINE HYDROCHLORIDE 60 MG: 60 CAPSULE, DELAYED RELEASE ORAL at 08:55

## 2019-05-23 RX ADMIN — CARBIDOPA AND LEVODOPA 1 TABLET: 25; 100 TABLET ORAL at 08:54

## 2019-05-23 RX ADMIN — FLUTICASONE PROPIONATE 2 SPRAY: 50 SPRAY, METERED NASAL at 09:06

## 2019-05-23 RX ADMIN — INSULIN LISPRO 1 UNITS: 100 INJECTION, SOLUTION INTRAVENOUS; SUBCUTANEOUS at 21:30

## 2019-05-23 RX ADMIN — IPRATROPIUM BROMIDE AND ALBUTEROL SULFATE 3 ML: .5; 3 SOLUTION RESPIRATORY (INHALATION) at 08:17

## 2019-05-23 RX ADMIN — Medication 2 PUFF: at 19:42

## 2019-05-23 RX ADMIN — METHYLPREDNISOLONE SODIUM SUCCINATE 40 MG: 40 INJECTION, POWDER, FOR SOLUTION INTRAMUSCULAR; INTRAVENOUS at 21:28

## 2019-05-23 RX ADMIN — PROPAFENONE HYDROCHLORIDE 150 MG: 150 TABLET, FILM COATED ORAL at 06:17

## 2019-05-23 RX ADMIN — IPRATROPIUM BROMIDE AND ALBUTEROL SULFATE 3 ML: .5; 3 SOLUTION RESPIRATORY (INHALATION) at 00:07

## 2019-05-23 RX ADMIN — IPRATROPIUM BROMIDE AND ALBUTEROL SULFATE 3 ML: .5; 3 SOLUTION RESPIRATORY (INHALATION) at 19:42

## 2019-05-23 RX ADMIN — INSULIN LISPRO 4 UNITS: 100 INJECTION, SOLUTION INTRAVENOUS; SUBCUTANEOUS at 11:48

## 2019-05-23 NOTE — PROGRESS NOTES
Shift assessment completed. Pt A&O, VSS, on 2L O2 via nasal cannula. Denies any needs at this time. Bed locked and in lowest position, side rails up 2/4. Call light within reach. Will continue to monitor.

## 2019-05-23 NOTE — CARE COORDINATION
CASE MANAGEMENT INITIAL ASSESSMENT      Reviewed chart and met with patient today, re: 70 y.o. female  Explained Case Management role/services. Family present:   Primary contact information: Jameel Kruger date/status: 5/22/19  Diagnosis: acute respiratory failure    Insurance: medicare  Precert required for SNF - n    3 night stay required - Y    Living arrangements, Adls, care needs, prior to admission: lives in ranch style home with  1 step entry    Transportation: tbd    [address] at home: Walker_x in house_Cane__RTS_x_ BSC__Shower Chair_x_  02_x  1L HS _privided by Lanie  HHN__ CPAP__  BiPap_x_  Hospital Bed__ W/C_x for distance__ Other___grab bars_/ floor to ceiling pole bed that can raise HOB up______    Services in the home and/or outpatient, prior to admission: none currently      PT/OT recs: none    Hospital Exemption Notification (HEN): needed for SNF    Barriers to discharge: none    Plan/comments: Patient plans on returning home at discharge. Reports she has support of her . relys on walker for ambulation. Was active with [facility] in past ok to resume if needed at discharge. Would like to pursue [employer] portable O2 generator.  Eden Gill RN      [facility] on chart for MD signature

## 2019-05-23 NOTE — PROGRESS NOTES
full range of motion. No jugular venous distention. Trachea midline. Respiratory:  Normal respiratory effort. Clear to auscultation, bilaterally without Rales/Wheezes/Rhonchi. Cardiovascular: Regular rate and rhythm with normal S1/S2 without murmurs, rubs or gallops. Abdomen: Soft, non-tender, non-distended with normal bowel sounds. Musculoskeletal: No clubbing, cyanosis or edema bilaterally. Full range of motion without deformity. Skin: Skin color, texture, turgor normal.  No rashes or lesions. Neurologic:  Neurovascularly intact without any focal sensory/motor deficits. Cranial nerves: II-XII intact, grossly non-focal.  Psychiatric: Alert and oriented, thought content appropriate, normal insight  Capillary Refill: Brisk,< 3 seconds   Peripheral Pulses: +2 palpable, equal bilaterally       Labs:   Recent Labs     05/22/19  1536 05/23/19  0539   WBC 11.5* 12.1*   HGB 11.0* 10.9*   HCT 35.4* 34.7*    321     Recent Labs     05/22/19  1536 05/23/19  0539    136   K 4.6 5.4*   CL 96* 94*   CO2 35* 32   BUN 39* 45*   CREATININE 1.8* 1.8*   CALCIUM 8.8 8.9     Recent Labs     05/22/19  1536   AST 14*   ALT 9*   BILITOT 0.4   ALKPHOS 134*     No results for input(s): INR in the last 72 hours. Recent Labs     05/22/19  1536   TROPONINI <0.01       Urinalysis:      Lab Results   Component Value Date    NITRU POSITIVE 05/22/2019    WBCUA >100 05/22/2019    BACTERIA 4+ 05/22/2019    RBCUA 0-2 05/22/2019    BLOODU Negative 05/22/2019    SPECGRAV 1.025 05/22/2019    GLUCOSEU Negative 05/22/2019       Radiology:  XR CHEST PORTABLE   Final Result   No evidence of acute cardiopulmonary disease.                  Assessment/Plan:    Active Hospital Problems    Diagnosis    Acute respiratory failure with hypoxemia (HCC) [J96.01]     Acute hypoxic respiratory failure,  Acute bronchitis, asthma exacerbation  Sched nebs, IV Solumedrol, empiric Doxycycline pending PCT  Cont home inhalers, Flonase, Singulair  Clinically improving gradually, monitor. AK I on CKD III, nephrology consulted, monitor renal function daily.     Parkinson's disease, Cont home Sinemet     Atrial fibrillation,  Cont home Eliquis, Propafenone, Metoprolol    Chr diastolic HF, hx of mild to moder MR  Mild rise in creat, cont Lasix, hold home ARB, reeval in am.       DVT Prophylaxis: Eliquis   DIET CARB CONTROL;  Code Status: Full Code    PT/OT Eval Status: ordered  Dispo -in 2-3 days     Katheryn Mukherjee MDOrdered

## 2019-05-23 NOTE — PROGRESS NOTES
Pt admitted to room 334 from ED. Pt oriented to room, call light, and telephone. Pt denies further needs or questions. Vital signs are stable. Pt is A/Ox4. Assesment is as charted. Lungs are clear. Pt reports dyspnea on exertion only. Pt remains on 2L O2.

## 2019-05-23 NOTE — PROGRESS NOTES
disease) stage 3, GFR 30-59 ml/min ([address] Community acquired pneumonia of left lower lobe of lung ([address] CRI, Detached retina, left, Diverticulosis, DJD (degenerative joint disease), Edema, Fatty liver, GERD (gastroesophageal reflux disease), Herniated lumbar intervertebral disc, Hx stage 2 sacral decubitus ulcer, Hyperlipidemia, Hypertension, Hypothyroid, Morbid obesity with BMI of 40.0-44.9, adult ([address] BETSY treated with BiPAP, Restless leg syndrome, Restless legs syndrome, Sleep apnea, Tremor, essential, Type 2 diabetes mellitus without complication ([address] and Type II or unspecified type diabetes mellitus without mention of complication, not stated as uncontrolled. has a past surgical history that includes Tonsillectomy; Hysterectomy; Cholecystectomy; Tubal ligation; knee surgery; hernia repair; eye surgery; Cataract removal; vitrectomy; Corneal transplant; Carotid endarterectomy; Appendectomy; cervical fusion (11/2010); shoulder surgery; and joint replacement. Restrictions  Restrictions/Precautions  Restrictions/Precautions: General Precautions, Fall Risk, Up as Tolerated  Position Activity Restriction  Other position/activity restrictions: High fall risk per nursing assessment, telemetry, 2L O2  Vision/Hearing  Vision: Impaired  Vision Exceptions: (blind in L eye)  Hearing: Within functional limits     Subjective  General  Chart Reviewed: Yes  Patient assessed for rehabilitation services?: Yes  Additional Pertinent Hx: Parkinson's disease  Family / Caregiver Present: No  Referring Practitioner: Dameon Romero MD  Referral Date : 05/23/19  Diagnosis: Acute hypoxic respiratory failure  Follows Commands: Within Functional Limits  General Comment  Comments: Pt resting in bed upon entry of therapy staff  Subjective  Subjective: Pt agreeable to work with PT/OT this afternoon. Says she was able to sit up in chair and take a shower earlier today. \"It felt great. \"  Pain Screening  Patient Currently in Pain: Denies  Intervention List: Patient able to continue with treatment    Orientation  Orientation  Overall Orientation Status: Within Functional Limits     Social/Functional History  Social/Functional History  Lives With: Spouse()  Type of Home: House  Home Layout: One level  Home Access: Stairs to enter without rails(1 BRANDON)  Bathroom Shower/Tub: Walk-in shower  Bathroom Toilet: Handicap height  Bathroom Equipment: Built-in shower seat, Grab bars in shower, Hand-held shower, Grab bars around toilet(one grab bar across from toilet)  Home Equipment: Cane, 4 wheeled walker, Reacher, Sock aid, Grab bars, Lift chair(home Bipap at night, adjustable bed, bar from floor to ceiling near bed to assist with supine<>sit)  ADL Assistance: Independent  Homemaking Responsibilities: No( does all homemaking)  Ambulation Assistance: Independent(using 4WW \"most of the time, unless I forget and then my  reminds me\")  Transfer Assistance: Independent  Active : Yes  Patient's  Info: Although  does [de-identified] of driving  Mode of Transportation: Family  Occupation: Retired  Type of occupation: Worked part-time for [employer] as   Leisure & Hobbies: Riding motorcycle tricycle with     Objective  Observation/Palpation  Posture: Fair  Observation: Tremors noted in head and UE's during mobility (pt reports these are 2* Parkinson's disease)    RLE AROM: WFL  LLE AROM : WFL  Strength RLE: Grossly 4-/5 to 4/5 throughout  Strength LLE: Grossly 4-/5 to 4/5 throughout     Bed mobility  Supine to Sit: Stand by assistance(moving toward R side, HOB elevated ~30 degrees)  Sit to Supine:  Moderate assistance(to lift LE's into bed)  Scooting: Stand by assistance(to scoot forward to EOB)     Transfers  Sit to Stand: Contact guard assistance;2 Person Assistance(CGA x 1 from EOB to RW, min-modA x 2 to stand from [facility] to Ericka (pt with sudden onset c/o dizziness at this point in session))  Stand to sit: Contact guard assistance  Bed to Chair: Contact guard assistance;Dependent/Total(CGA x 1 from bed>toilet, using RW; assist x 2 using Kiki-Stedy from BSC>bed (due to sudden onset of dizziness))     Ambulation  Surface: level tile  Device: Rolling Walker  Other Apparatus: O2(2L O2)  Assistance: Contact guard assistance;Minimal assistance  Gait Deviations: Slow Nemo;Decreased step length;Decreased step height;Shuffles;Decreased head and trunk rotation  Distance: x 15 feet (into bathroom)  Comments: Amb distance limited by sudden onset of dizziness, fatigue, and shakiness. Balance  Posture: Fair  Sitting - Static: Good  Sitting - Dynamic: Fair  Standing - Static: Fair  Standing - Dynamic: Fair;-    Plan   Times per week: 3-5x/week in acute care  Times per day: Daily  Specific instructions for Next Treatment: Progress ther ex and mobility as tolerated  Current Treatment Recommendations: Strengthening, Gait Training, Balance Training, ROM, Functional Mobility Training, Endurance Training, Transfer Training, Safety Education & Training, Patient/Caregiver Education & Training, Equipment Evaluation, Education, & procurement  Safety Devices:  All fall risk precautions in place, Bed alarm in place, Gait belt, Patient at risk for falls, Left in bed, Nurse notified, Call light within reach    AM-PAC Score  AM-PAC Inpatient Mobility without Stair Climbing Raw Score : 14  AM-PAC Inpatient without Stair Climbing T-Scale Score : 40.85  Mobility Inpatient CMS 0-100% Score: 53.33  Mobility Inpatient without Stair CMS G-Code Modifier : CK    Goals  Short term goals  Time Frame for Short term goals: 1 week, 5/30/19 (unless otherwise specified)  Short term goal 1: Pt will transfer supine <-> sit with supervision  Short term goal 2: Pt will transfer sit <-> stand and bed>chair using RW with CGA x 1  Short term goal 3: Pt will ambulate x 60 feet using RW with CGA x 1  Short term goal 4: By 5/25/19: Pt will tolerate 12-15 reps BLE exercise for strengthening, balance, and endurance  Patient Goals   Patient goals :  \"To get stronger and more steady\"       Therapy Time   Individual Concurrent Group Co-treatment   Time In 5068         Time Out 1725         Minutes 31         Timed Code Treatment Minutes: [address], [place], [place] #[id number]

## 2019-05-23 NOTE — PROGRESS NOTES
4 Eyes Skin Assessment     The patient is being assess for  Admission    I agree that 2 RN's have performed a thorough Head to Toe Skin Assessment on the patient. ALL assessment sites listed below have been assessed. Areas assessed by both nurses: Kriss eLwis RN and Almer Dakins, RN  [x]   Head, Face, and Ears   [x]   Shoulders, Back, and Chest  [x]   Arms, Elbows, and Hands   [x]   Coccyx, Sacrum, and Ischum  [x]   Legs, Feet, and Heels        Does the Patient have Skin Breakdown?   No         Vineet Prevention initiated:  NA   Wound Care Orders initiated:  NA      WOC nurse consulted for Pressure Injury (Stage 3,4, Unstageable, DTI, NWPT, and Complex wounds):  NA      Nurse 1 eSignature: Electronically signed by Rama Tsai RN on 5/23/19 at 4:55 AM    **SHARE this note so that the co-signing nurse is able to place an eSignature**    Nurse 2 eSignature: Electronically signed by Karen Mcguire RN on 5/23/19 at 4:56 AM

## 2019-05-23 NOTE — PROGRESS NOTES
RESPIRATORY THERAPY ASSESSMENT    Name:  [address] [place] [address] Record Number:  [id number]  Age: 70 y.o. Gender: female  : 1940  Today's Date:  2019  Room:  03340334-01    Assessment     Is the patient being admitted for a COPD or Asthma exacerbation? No   (If yes the patient will be seen every 4 hours for the first 24 hours and then reassessed)    Patient Admission Diagnosis      Allergies  Allergies   Allergen Reactions    Levaquin [Levofloxacin]      Pt was given in er and developed hives and redness    Pyridium [Phenazopyridine Hcl]     Reglan [Metoclopramide Hcl]     Reglan [Metoclopramide Hcl]      tremors       Minimum Predicted Vital Capacity:     -          Actual Vital Capacity:      Pt placed on bipap for the night               Pulmonary History:Asthma pt also has BETSY   Home Oxygen Therapy:  1 liters/min via into home bipap at night  Home Respiratory Therapy: Aljbuterol, pulmicort, duoneb  Current Respiratory Therapy:  Flovent, Duoneb, Bevespi respimat daily  Treatment Type: N  Medications: Albuterol/Ipratropium    Respiratory Severity Index(RSI)   Patients with orders for inhalation medications, oxygen, or any therapeutic treatment modality will be placed on Respiratory Protocol. They will be assessed with the first treatment and at least every 72 hours thereafter. The following severity scale will be used to determine frequency of treatment intervention.     Smoking History: No Smoking History = 0    Social History  Social History     Tobacco Use    Smoking status: Never Smoker    Smokeless tobacco: Never Used   Substance Use Topics    Alcohol use: No     Alcohol/week: 0.0 oz    Drug use: No       Recent Surgical History: None = 0  Past Surgical History  Past Surgical History:   Procedure Laterality Date    APPENDECTOMY      CAROTID ENDARTERECTOMY      left    CATARACT REMOVAL      CERVICAL FUSION  2010    CHOLECYSTECTOMY      CORNEAL TRANSPLANT      EYE SURGERY

## 2019-05-23 NOTE — CARE COORDINATION
[address] Transitions Interview     2019    Patient: Agnes Soni Patient : 1940   MRN: [id number]  Reason for Admission: Acute Respiratory Failure with hypoxia   RARS: Readmission Risk Score: 34         Spoke with: Agnes Soni       Readmission Risk  Patient Active Problem List   Diagnosis    GERD (gastroesophageal reflux disease)    Essential hypertension    Hyperlipidemia    Restless leg syndrome    Chronic normocytic anemia    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    Moderate persistent asthma    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-sided sciatica    Morbid obesity with BMI of 40.0-44.9, adult (HCC)    Type 2 diabetes mellitus with stage 3 chronic kidney disease, without long-term current use of insulin (HCC)    CAD (coronary artery disease)    Parkinson's disease ([facility]    BETSY treated with BiPAP    Hypothyroidism    Hepatic steatosis    Diverticulosis    Blind left eye    L carotid artery stenosis s/p CEA    PAF (paroxysmal atrial fibrillation) (HCC)    COPD (chronic obstructive pulmonary disease) (HCC)    Acute on chronic respiratory failure with hypoxia ([facility])    Chronic diastolic congestive heart failure ([facility])    Basal cell carcinoma of left cheek    Acute respiratory failure with hypoxemia [facility])       Inpatient Assessment  Care Transitions Summary    Care Transitions Inpatient Review  Medication Review  Do you have all of your prescriptions and are they filled?:  Yes   Barriers to Medication Adherence:  None  Are you able to afford your medications?:  Yes  How often do you have difficulty taking your medications?:  I always take them as prescribed.   Housing Review  Who do you live with?:  Partner/Spouse/SO  Are you an active caregiver in your home?:  No  Social Support  Do you have a ?:  No  Do you have a [address] [place] [address]?:  No  Durable Medical Equipment  Patient DME:  Shower chair, [place]her [place], Wheelchair, Straight cane, [place], Other  Other Patient DME:  grab bar in shower and around toilet, long handled sponge  Patient Home Equipment:  Nebulizer, BiPAP, Oxygen  Functional Review  Ability to seek help/take action for Emergent/Urgent situations i.e. fire, crime, inclement weather or health crisis. :  Independent  Ability handle personal hygiene needs (bathing/dressing/grooming): Independent  Ability to manage medications:  Needs Assistance  Ability to prepare food:  Dependent  Ability to maintain home (clean home, laundry):  Dependent  Ability to drive and/or has transportation:  Dependent  Ability to do shopping:  Needs Assistance  Ability to manage finances:  Dependent  Is patient able to live independently?:  No  Hearing and Vision  Visual Impairment:  Blind, Visual impairment (Glasses/contacts) (Comment: blind left eye)  Hearing Impairment:  None  Care Transitions Interventions     Met with patient to discuss care transition. Role of CTC and care transition program explained to patient. Patient reported she resides with her spouse in a private residence. Patient stated she is independent with bathing and personal care and  helps with medication management, cooking, cleaning, shopping, and transportation. Patient has home oxygen through [facility]. Patient open to home care services at discharge and per chart review referral has been made to [facility]. If meets criteria, patient agreeable to participate in care transition program post discharge. Encouraged patient to ask to speak with  on the unit should any needs arise. Notified TEODORO Fajardo RN of the above.         Follow Up  Future Appointments   Date Time Provider Katy Booker   7/25/2019  1:45 PM MD Kevyn Mcgar[facility]   8/5/2019 10:00 AM MD [facility]   11/13/2019 10:30 AM [place], APRN - CNP [facility]   4/6/2020 10:00 AM SCHEDULE, MHCX BIGG FP AWV LPANDREY [facility]       Health Maintenance  There are no preventive care reminders to display for this patient.     Jack Covarrubias RN, BSN, [address] [facility] Transitions Coordinator    [phone]

## 2019-05-23 NOTE — CARE COORDINATION
[facility]    Referral received from ELIZABETH Collazo RN CM with request to follow for potential home care needs. Pt is part of the Matt ho reviewed H&P. Writer has also noted the pt has a hx of CHF. Will assess if pt is appropriate for/ agreeable to [place] [address] Program for Management of CHF. I will continue to follow patient for needs, and arrange [place] [address] services prior to DC. Should pt dc over the weekend please fax facesheet, order, MICA, and H&P to [facility].      Tammie Pena RN CTN with [address] [address]  NJQ:[phone]

## 2019-05-23 NOTE — PROGRESS NOTES
CKD (chronic kidney disease) stage 3, GFR 30-59 ml/min ([facility] Community acquired pneumonia of left lower lobe of lung ([address] CRI, Detached retina, left, Diverticulosis, DJD (degenerative joint disease), Edema, Fatty liver, GERD (gastroesophageal reflux disease), Herniated lumbar intervertebral disc, Hx stage 2 sacral decubitus ulcer, Hyperlipidemia, Hypertension, Hypothyroid, Morbid obesity with BMI of 40.0-44.9, adult ([facility] BETSY treated with BiPAP, Restless leg syndrome, Restless legs syndrome, Sleep apnea, Tremor, essential, Type 2 diabetes mellitus without complication ([facility] and Type II or unspecified type diabetes mellitus without mention of complication, not stated as uncontrolled. has a past surgical history that includes Tonsillectomy; Hysterectomy; Cholecystectomy; Tubal ligation; knee surgery; hernia repair; eye surgery; Cataract removal; vitrectomy; Corneal transplant; Carotid endarterectomy; Appendectomy; cervical fusion (11/2010); shoulder surgery; and joint replacement.        Restrictions  Restrictions/Precautions  Restrictions/Precautions: General Precautions, Fall Risk, Up as Tolerated  Position Activity Restriction  Other position/activity restrictions: High fall risk per nursing assessment, telemetry, 2L O2    Subjective   General  Chart Reviewed: Yes  Patient assessed for rehabilitation services?: Yes  Referring Practitioner: Saud Vences MD  Diagnosis: acute respiratory failure with hypoxemia  General Comment  Comments: Per RN ok for therapy  Oxygen Therapy  SpO2: 97 %  O2 Device: Nasal cannula  O2 Flow Rate (L/min): 2 L/min  Social/Functional History  Social/Functional History  Lives With: Spouse  Type of Home: House  Home Layout: One level  Home Access: Stairs to enter without rails  Entrance Stairs - Number of Steps: 1  Bathroom Shower/Tub: Walk-in shower  Bathroom Toilet: Handicap height  Bathroom Equipment: Built-in shower seat, Grab bars in shower, Hand-held shower, Grab bars around toilet(one pericare)  Tone RUE  RUE Tone: Normotonic  Tone LUE  LUE Tone: Normotonic  Coordination  Movements Are Fluid And Coordinated: Yes     Bed mobility  Supine to Sit: Stand by assistance  Sit to Supine:  Moderate assistance(BLE management)  Scooting: Stand by assistance  Transfers  Sit to stand: Contact guard assistance  Stand to sit: Contact guard assistance     Cognition  Overall Cognitive Status: WFL     Sensation  Overall Sensation Status: WFL      LUE AROM (degrees)  LUE AROM : WFL  Left Hand AROM (degrees)  Left Hand AROM: WFL  RUE AROM (degrees)  RUE AROM : WFL  Right Hand AROM (degrees)  Right Hand AROM: WFL  LUE Strength  Gross LUE Strength: WFL  RUE Strength  Gross RUE Strength: WFL     Plan   Plan  Times per week: 3-5x/wk  Current Treatment Recommendations: Strengthening, ROM, Safety Education & Training, Balance Training, Self-Care / ADL, Functional Mobility Training, Endurance Training    AM-PAC Score     [facility] Inpatient Daily Activity Raw Score: 15  AM-PAC Inpatient ADL T-Scale Score : 34.69  ADL Inpatient CMS 0-100% Score: 56.46  ADL Inpatient CMS G-Code Modifier : CK    Goals  Short term goals  Time Frame for Short term goals: within one week (5/30)  Short term goal 1: Pt will complete functional transfer/toilet transfer with LRAD (Mod I)  Short term goal 2: Pt will complete 2-3 standing ADLs (Mod I) by 5/26  Short term goal 3: Pt will complete LB dressing with AE prn (Mod I)  Patient Goals   Patient goals : pt did not state     Therapy Time   Individual Concurrent Group Co-treatment   Time In 1654         Time Out 1725         Minutes 31         Timed Code Treatment Minutes: 21 Minutes(10 min eval)       MERLY Lennon/JAIMIE

## 2019-05-24 LAB
ANION GAP SERPL CALCULATED.3IONS-SCNC: 10 MMOL/L (ref 3–16)
BUN BLDV-MCNC: 49 MG/DL (ref 7–20)
CALCIUM SERPL-MCNC: 8.8 MG/DL (ref 8.3–10.6)
CHLORIDE BLD-SCNC: 92 MMOL/L (ref 99–110)
CO2: 31 MMOL/L (ref 21–32)
CREAT SERPL-MCNC: 1.5 MG/DL (ref 0.6–1.2)
GFR AFRICAN AMERICAN: 41
GFR NON-AFRICAN AMERICAN: 34
GLUCOSE BLD-MCNC: 167 MG/DL (ref 70–99)
GLUCOSE BLD-MCNC: 170 MG/DL (ref 70–99)
GLUCOSE BLD-MCNC: 171 MG/DL (ref 70–99)
GLUCOSE BLD-MCNC: 181 MG/DL (ref 70–99)
GLUCOSE BLD-MCNC: 184 MG/DL (ref 70–99)
PERFORMED ON: ABNORMAL
POTASSIUM SERPL-SCNC: 4.9 MMOL/L (ref 3.5–5.1)
SODIUM BLD-SCNC: 133 MMOL/L (ref 136–145)

## 2019-05-24 PROCEDURE — 6370000000 HC RX 637 (ALT 250 FOR IP): Performed by: INTERNAL MEDICINE

## 2019-05-24 PROCEDURE — 6370000000 HC RX 637 (ALT 250 FOR IP): Performed by: HOSPITALIST

## 2019-05-24 PROCEDURE — 36415 COLL VENOUS BLD VENIPUNCTURE: CPT

## 2019-05-24 PROCEDURE — 94660 CPAP INITIATION&MGMT: CPT

## 2019-05-24 PROCEDURE — 94640 AIRWAY INHALATION TREATMENT: CPT

## 2019-05-24 PROCEDURE — 80048 BASIC METABOLIC PNL TOTAL CA: CPT

## 2019-05-24 PROCEDURE — 6360000002 HC RX W HCPCS: Performed by: HOSPITALIST

## 2019-05-24 PROCEDURE — 1200000000 HC SEMI PRIVATE

## 2019-05-24 PROCEDURE — 94669 MECHANICAL CHEST WALL OSCILL: CPT

## 2019-05-24 PROCEDURE — 2580000003 HC RX 258: Performed by: HOSPITALIST

## 2019-05-24 RX ORDER — FUROSEMIDE 40 MG/1
40 TABLET ORAL 2 TIMES DAILY
Status: DISCONTINUED | OUTPATIENT
Start: 2019-05-25 | End: 2019-05-28 | Stop reason: HOSPADM

## 2019-05-24 RX ADMIN — INSULIN LISPRO 2 UNITS: 100 INJECTION, SOLUTION INTRAVENOUS; SUBCUTANEOUS at 08:11

## 2019-05-24 RX ADMIN — PROPAFENONE HYDROCHLORIDE 150 MG: 150 TABLET, FILM COATED ORAL at 14:08

## 2019-05-24 RX ADMIN — APIXABAN 2.5 MG: 2.5 TABLET, FILM COATED ORAL at 21:10

## 2019-05-24 RX ADMIN — LAMOTRIGINE 50 MG: 25 TABLET ORAL at 08:09

## 2019-05-24 RX ADMIN — IPRATROPIUM BROMIDE AND ALBUTEROL SULFATE 3 ML: .5; 3 SOLUTION RESPIRATORY (INHALATION) at 11:41

## 2019-05-24 RX ADMIN — PROPAFENONE HYDROCHLORIDE 150 MG: 150 TABLET, FILM COATED ORAL at 21:10

## 2019-05-24 RX ADMIN — MONTELUKAST 10 MG: 10 TABLET, FILM COATED ORAL at 21:10

## 2019-05-24 RX ADMIN — LAMOTRIGINE 50 MG: 25 TABLET ORAL at 21:10

## 2019-05-24 RX ADMIN — METOPROLOL TARTRATE 50 MG: 50 TABLET ORAL at 08:10

## 2019-05-24 RX ADMIN — Medication 2 PUFF: at 08:12

## 2019-05-24 RX ADMIN — Medication 1 TABLET: at 08:10

## 2019-05-24 RX ADMIN — CARBIDOPA AND LEVODOPA 1 TABLET: 25; 100 TABLET ORAL at 21:10

## 2019-05-24 RX ADMIN — CARBIDOPA AND LEVODOPA 1 TABLET: 25; 100 TABLET ORAL at 08:10

## 2019-05-24 RX ADMIN — DOXYCYCLINE HYCLATE 100 MG: 100 TABLET, COATED ORAL at 08:09

## 2019-05-24 RX ADMIN — ATORVASTATIN CALCIUM 20 MG: 10 TABLET, FILM COATED ORAL at 21:10

## 2019-05-24 RX ADMIN — DULOXETINE HYDROCHLORIDE 60 MG: 60 CAPSULE, DELAYED RELEASE ORAL at 08:10

## 2019-05-24 RX ADMIN — IPRATROPIUM BROMIDE AND ALBUTEROL SULFATE 3 ML: .5; 3 SOLUTION RESPIRATORY (INHALATION) at 16:09

## 2019-05-24 RX ADMIN — CARBIDOPA AND LEVODOPA 1 TABLET: 25; 100 TABLET ORAL at 14:08

## 2019-05-24 RX ADMIN — Medication 2 PUFF: at 21:18

## 2019-05-24 RX ADMIN — SODIUM CHLORIDE, PRESERVATIVE FREE 10 ML: 5 INJECTION INTRAVENOUS at 08:11

## 2019-05-24 RX ADMIN — IPRATROPIUM BROMIDE AND ALBUTEROL SULFATE 3 ML: .5; 3 SOLUTION RESPIRATORY (INHALATION) at 21:18

## 2019-05-24 RX ADMIN — GABAPENTIN 100 MG: 100 CAPSULE ORAL at 17:21

## 2019-05-24 RX ADMIN — ASPIRIN 81 MG: 81 TABLET, COATED ORAL at 08:09

## 2019-05-24 RX ADMIN — INSULIN LISPRO 2 UNITS: 100 INJECTION, SOLUTION INTRAVENOUS; SUBCUTANEOUS at 12:08

## 2019-05-24 RX ADMIN — IPRATROPIUM BROMIDE AND ALBUTEROL SULFATE 3 ML: .5; 3 SOLUTION RESPIRATORY (INHALATION) at 08:11

## 2019-05-24 RX ADMIN — DOXYCYCLINE HYCLATE 100 MG: 100 TABLET, COATED ORAL at 21:10

## 2019-05-24 RX ADMIN — METHYLPREDNISOLONE SODIUM SUCCINATE 40 MG: 40 INJECTION, POWDER, FOR SOLUTION INTRAMUSCULAR; INTRAVENOUS at 21:10

## 2019-05-24 RX ADMIN — APIXABAN 2.5 MG: 2.5 TABLET, FILM COATED ORAL at 08:10

## 2019-05-24 RX ADMIN — OLODATEROL RESPIMAT INHALATION SPRAY 2 PUFF: 2.5 SPRAY, METERED RESPIRATORY (INHALATION) at 08:15

## 2019-05-24 RX ADMIN — PROPAFENONE HYDROCHLORIDE 150 MG: 150 TABLET, FILM COATED ORAL at 06:07

## 2019-05-24 RX ADMIN — INSULIN LISPRO 1 UNITS: 100 INJECTION, SOLUTION INTRAVENOUS; SUBCUTANEOUS at 21:55

## 2019-05-24 RX ADMIN — INSULIN LISPRO 2 UNITS: 100 INJECTION, SOLUTION INTRAVENOUS; SUBCUTANEOUS at 17:25

## 2019-05-24 RX ADMIN — FUROSEMIDE 40 MG: 40 TABLET ORAL at 08:10

## 2019-05-24 RX ADMIN — FLUTICASONE PROPIONATE 2 SPRAY: 50 SPRAY, METERED NASAL at 08:10

## 2019-05-24 RX ADMIN — SODIUM CHLORIDE, PRESERVATIVE FREE 10 ML: 5 INJECTION INTRAVENOUS at 21:14

## 2019-05-24 RX ADMIN — METOPROLOL TARTRATE 50 MG: 50 TABLET ORAL at 21:10

## 2019-05-24 RX ADMIN — METHYLPREDNISOLONE SODIUM SUCCINATE 40 MG: 40 INJECTION, POWDER, FOR SOLUTION INTRAMUSCULAR; INTRAVENOUS at 08:11

## 2019-05-24 NOTE — CONSULTS
[address] [address]                                  CONSULTATION    PATIENT NAME: Katie Mayo                    :        1940  MED REC NO:   [id number]                          ROOM:       1489  ACCOUNT NO:   [de-identified]                           ADMIT DATE: 2019  PROVIDER:     Cynthia Infante MD    CONSULT DATE:  2019    ADMITTING PHYSICIAN:  Monae Carlosn MD    REASON FOR ADMISSION:  The patient with shortness of breath with  underlying history of congestive heart failure, possible pneumonia. REASON FOR CONSULTATION:  Acute-on-chronic kidney disease. The patient  is seen by my partner, Dr. Sade Padilla. HISTORY OF PRESENTING COMPLAINT:  This is a 61-year-old overweight  female seen in the room with her . This lady is known to have  chronic kidney disease with congestive heart failure, diastolic in  nature, history of diabetes, morbid obesity, who came to the hospital  because of worsening shortness of breath. According to the patient and  her , her breathing got worse in the last two weeks. She was  seen by the primary care physician. Chest x-ray with suspected CHF for  which her Lasix dose was increased for three days which improved some  shortness of breath. Apparently, she lost 10 pounds of fluid weight  with left leg swelling but she continues to have shortness of breath for  which she decided to come to the Emergency Department. This patient normally I believe is on home oxygen, uses BiPAP. She was  admitted for further workup for possible pneumonia, worsening congestive  heart failure. I was asked to see her regarding her chronic kidney  disease. She has intermittent acute-on-chronic kidney disease. She was  here at the hospital in 10/2018 when her creatinine went up to 2.2 but  later on, it was 1.5. Urine in the past shows 100 mg of protein.     Here, her creatinine has now gone up to 1.8. Her blood pressure is also  high in the range of 177/80. All the chest x-ray is reported normal.    I am seeing her in a room where she is very comfortable, sitting. There  was some edema, mild. Chest has a few crackles. REVIEW OF SYSTEMS:  GENERAL:  Good appetite. No fever or chills. CARDIAC:  No chest pain. PULMONARY:  Shortness of breath on exertion. GENITOURINARY:  No dysuria, hematuria. VASCULAR:  No claudication. GASTROINTESTINAL:  No nausea or vomiting. PSYCHIATRIC:  No depression or anxiety. Review of the rest of the systems is negative. PAST HISTORY:  Morbid obesity. 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 Previous carotid stenosis in the left for which she had carotid  endarterectomy, cervical spinal cord compression in the past, history of  sciatica, history of detached retina on the left side, history of fatty  liver, history of hypertension, sleep apnea, type 2 diabetes. PERSONAL AND SOCIAL HISTORY:  She lives at home. She never smoked. No  history of alcohol intake. ADMISSION MEDICATIONS:  Include Eliquis, Sinemet, Rythmol, Losartan,  Lopressor, Cymbalta, Neurontin, inhalers, Singulair, lamotrigine,  Vesicare. ALLERGIES:  LEVAQUIN, PYRIDIUM, REGLAN. PHYSICAL EXAMINATION:  GENERAL:  A very pleasant female, awake and alert, obese, in no  distress.  at bedside. VITAL SIGNS:  Temperature 98.1, respiratory rate 18, pulse 69, blood  pressure 131/59. HEENT:  Head:  Normocephalic, atraumatic. Conjunctivae:  No icterus. CARDIAC:  She has normal heart sounds. NECK:  JVD difficult to see but does not seem to be raised. CHEST:  She has a few basal crackles bilaterally, persist after  coughing. ABDOMEN:  Distended. She is obese. Could not palpate any large liver,  spleen, kidneys, bladder. Bowel sounds are present. EXTREMITIES:  Bilateral lower extremities, she has some mild edema, some  puffiness.   NEUROLOGICAL:  She is awake and alert.  No focal neurological signs. LABORATORY DATA:  Shows sodium 136, potassium 5.4, chloride 94, CO2 is  32, BUN is 45, creatinine is 1.8, magnesium is 2.7, glucose 153. ProBNP  was 483. Normal is up to 449. Albumin 3.8. Liver function tests are  normal.  White cell 12.1, hemoglobin 10.9, platelet count is 290. She  has high neutrophil in the blood. Chest x-ray is reported normal.    Urine shows specific gravity of 1.025, trace ketones, 30 mg of protein,  greater than 100 white cells in the urine, moderate leukocyte esterase. Chest x-ray, no acute cardiopulmonary abnormality. ASSESSMENT AND PLAN:  1. Acute-on-chronic kidney disease. She is very close to her baseline  with fluctuating creatinine. At this point, she does not need further  workup from a renal point of view. Chronic kidney disease could be  related to underlying diastolic heart failure or could be underlying  diabetes. Check protein-to-creatinine ratio. Some of the rise in creatinine is due to recent use of diuretics. 2.  Heart failure with preserved ejection fraction. Her last echo shows  grade 1 diastolic dysfunction. She does have mild to moderate mitral  regurg and tricuspid regurgitation. Left atrium was enlarged. Pulmonary artery pressure is 51. Currently, clinically, she does not have any evidence of fluid overload. Her proBNP is also only mildly elevated at 483. I do not feel that she at this point needs more aggressive diuresis but  will have to be maintained on a small dose of diuretics so she does not  get fluid back. 3.  Diabetes. Check protein-to-creatinine ratio. 4.  Anemia. Check iron studies, B12, folate if they have not been done  before. Continue close monitoring.     Yours sincerely,        Carlos Rock MD    D: 05/23/2019 18:37:53       T: 05/23/2019 19:45:21     [id number]  Job#: [id number]     Doc#: [id number]    CC:

## 2019-05-24 NOTE — PROGRESS NOTES
Admit Date: 5/22/2019      INTERVAL HISTORY    Creat 1.8-->1.5  U/o 1125  Pt feels better   still has b/b crepts  On lasix IV 40, inc. To BID. SOB improving. ASSESSMENT/ PLAN     Acute hypoxic respiratory failure    Acute bronchitis, asthma exacerbation   IV Solumedrol   empiric Doxycycline     AK I on CKD III. Baseline 1.2-1.5  U/A possible UTI  P/C 400 mg        Parkinson's disease  Sinemet      Atrial fibrillation    Cont home Eliquis, Propafenone, Metoprolol     HFpEF   hx of mild to moder MR   cont Lasix   hold home ARB    [facility]  70 y.o. female who presented to [facility] with worsening SOB. Sx started couple weeks ago, saw PCP, CXR with suspected CHF, advised to double her Lasix x 3 days then reeval.  Notes some improvement in SOB with Lasix, incl 10 lb wt decline and less leg swelling. Despite this persistent SOB, ANN. Ongoing cough, difficulty clearing phlegm. Presented to ED, hypoxic on RA, baseline uses BIPAP with 02 qhs alone. Plan to admit for further workup/mgmt          Subjective:   Patient has no complaint       Review of Systems  Seen in room     Objective:     Patient Vitals for the past 8 hrs:   BP Temp Temp src Pulse Resp SpO2   05/24/19 0815 -- -- -- -- -- 92 %   05/24/19 0751 (!) 172/95 97.4 °F (36.3 °C) Oral 62 16 94 %   05/24/19 0618 -- -- -- -- -- 92 %   05/24/19 0353 (!) 145/71 97.4 °F (36.3 °C) Axillary 61 16 100 %   05/24/19 0322 -- -- -- -- 16 --       I/O last 3 completed shifts: In: 2030 [P.O.:2020;  I.V.:10]  Out: 1125 [Urine:1125]        General appearance:Awake, alert,   Neck: , no JVD and thyroid not enlarged,   Lungs: clear to auscultation bilaterally   Heart: regular rate and rhythm, S1, S2 normal, no murmur, click, rub or gallop  Abdomen: soft, non-tender; bowel sounds normal; no masses,  no organomegaly  Extremities: extremities normal, atraumatic, no edema  Skin: Skin color, texture, turgor normal. No rashes or lesions  Neurologic: Grossly normal     .l  Lab Results   Component Value Date    CREATININE 1.5 (H) 05/24/2019    BUN 49 (H) 05/24/2019     (L) 05/24/2019    K 4.9 05/24/2019    CL 92 (L) 05/24/2019    CO2 31 05/24/2019     Lab Results   Component Value Date    WBC 12.1 (H) 05/23/2019    HGB 10.9 (L) 05/23/2019    HCT 34.7 (L) 05/23/2019    MCV 86.2 05/23/2019     05/23/2019            AGLUCOSE)Magnesium:    Lab Results   Component Value Date    MG 2.70 05/23/2019     Phosphorus:    Lab Results   Component Value Date    PHOS 3.5 05/13/2019       Uric Acid:  No components found for: URIC    Active Problems:    Acute respiratory failure with hypoxemia (HCC)  Resolved Problems:    * No resolved hospital problems.  *

## 2019-05-24 NOTE — PLAN OF CARE
Bed locked and in lowest position, call light within reach, room free from clutter, non-skid socks on, bed alarm on. Will continue to monitor.

## 2019-05-24 NOTE — PROGRESS NOTES
Shift assessment completed. Pt A&O, VSS. Denies any needs at this time. Bed locked and in lowest position, side rails up 2/4. Call light within reach. Will continue to monitor.

## 2019-05-24 NOTE — PROGRESS NOTES
Shift assessment completed and charted. Pt alert and oriented, VSS. Pts SpO2 97% on 1LO2 NC. Weaned pt to room air. Pts SpO2 now 92-95% on room air. Pt with slight SOB at rest that worsens with exertion. Generalized non-pitting edema. Lungs clear throughout. IV Solumedrol given per order. Pt denies pain or needs at this time. Will continue to monitor.

## 2019-05-24 NOTE — PLAN OF CARE
Problem: Falls - Risk of:  Goal: Will remain free from falls  Description  Will remain free from falls  5/24/2019 1017 by Winnie Dow RN  Outcome: Ongoing  Note:   Pt remains free from falls this shift. Bed alarm active. Non-skid socks on. Side rails up 2/4. Call light within reach. Bed locked and in lowest position. Will continue to monitor.    5/23/2019 2144 by Naya Mathew RN  Outcome: Ongoing

## 2019-05-24 NOTE — DISCHARGE INSTR - COC
Continuity of Care Form    Patient Name: Jose Escalante   :  1940  MRN:  [id number]    Admit date:  2019  Discharge date: 19    Code Status Order: Full Code   Advance Directives:   [facility] Documentation     Date/Time Healthcare Directive Type of Healthcare Directive Copy in [facility] [address] Agent's Name Healthcare Agent's Phone Number    19 1449  Yes, patient has an advance directive for healthcare treatment  --  No, copy requested from [facility] power of   Dequan Katie  [phone]          Admitting Physician:  Rafa Bird MD  PCP: Gabriela Sidhu MD    Discharging Nurse: [facility] Unit/Room#: 7647/4612-39  Discharging Unit Phone Number: [phone]    Emergency Contact:   Extended Emergency Contact Information  Primary Emergency Contact:  Wesly Bowen  Address: [address] [place] [address]  Home Phone: [phone]  Work Phone: [phone]  Mobile Phone: [phone]  Relation: Spouse    Past Surgical History:  Past Surgical History:   Procedure Laterality Date    APPENDECTOMY      CAROTID ENDARTERECTOMY      left    CATARACT REMOVAL      CERVICAL FUSION  2010    CHOLECYSTECTOMY      CORNEAL TRANSPLANT      EYE SURGERY      HERNIA REPAIR      HYSTERECTOMY      JOINT REPLACEMENT      KNEE SURGERY      replacement x 2    SHOULDER SURGERY      TONSILLECTOMY      TUBAL LIGATION      VITRECTOMY         Immunization History:   Immunization History   Administered Date(s) Administered    Hepatitis A 2019    Hepatitis A Adult (Vaqta) 2019    Influenza Vaccine, unspecified formulation 2016, 2017    Influenza Virus Vaccine 2011, 10/01/2012    Influenza Whole 2015    Influenza, High Dose (Fluzone 65 yrs and older) 09/15/2017, 2018    Pneumococcal 13-valent Conjugate (Suvegmj13) 2015    Pneumococcal Polysaccharide (Luioohasw64) 11/26/2007    Zoster Subunit (Shingrix) 05/08/2019       Active Problems:  Patient Active Problem List   Diagnosis Code    GERD (gastroesophageal reflux disease) K21.9    Essential hypertension I10    Hyperlipidemia E78.5    Restless leg syndrome G25.81    Chronic normocytic anemia D64.9    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 N18.2    Moderate persistent asthma J45.40    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-sided sciatica M54.41, G89.29    Morbid obesity with BMI of 40.0-44.9, adult ([facility]) E66.01, Z68.41    Type 2 diabetes mellitus with stage 3 chronic kidney disease, without long-term current use of insulin ([facility]) E11.22, N18.3    CAD (coronary artery disease) I25.10    Parkinson's disease ([facility] [address] G20    BETSY treated with BiPAP G47.33    Hypothyroidism E03.9    Hepatic steatosis K76.0    Diverticulosis K57.90    Blind left eye H54.40    L carotid artery stenosis s/p CEA I65.29    PAF (paroxysmal atrial fibrillation) ([facility]) I48.0    COPD (chronic obstructive pulmonary disease) ([facility]) J44.9    Acute on chronic respiratory failure with hypoxia ([facility]) J96.21    Chronic diastolic congestive heart failure ([facility]) I50.32    Basal cell carcinoma of left cheek C44.319    Acute respiratory failure with hypoxemia ([facility]) J96.01       Isolation/Infection:   Isolation          No Isolation            Nurse Assessment:  Last Vital Signs: BP (!) 172/95   Pulse 62   Temp 97.4 °F (36.3 °C) (Oral)   Resp 16   Ht 5' 6\" (1.676 m)   Wt 241 lb (109.3 kg)   SpO2 92%   BMI 38.90 kg/m²     Last documented pain score (0-10 scale): Pain Level: 0  Last Weight:   Wt Readings from Last 1 Encounters:   05/22/19 241 lb (109.3 kg)     Mental Status:  oriented and alert    IV Access:  - None    Nursing Mobility/ADLs:  Walking   Assisted  Transfer  Assisted  Bathing  Assisted  Dressing  Assisted  Toileting  Assisted  Feeding  Independent  Med Admin  Assisted  Med Delivery   whole and prefers mixed with applesauce    Wound Care Documentation and Therapy:  Wound 12/10/18 Buttocks Right Stage II pressure ulcer (Active)   Number of days: 164        Elimination:  Continence:   · Bowel: Yes  · Bladder: Yes  Urinary Catheter: None   Colostomy/Ileostomy/Ileal Conduit: No       Date of Last BM: 5/27/19    Intake/Output Summary (Last 24 hours) at 5/24/2019 1049  Last data filed at 5/24/2019 0927  Gross per 24 hour   Intake 2270 ml   Output 1325 ml   Net 945 ml     I/O last 3 completed shifts: In: 2030 [P.O.:2020; I.V.:10]  Out: 1125 [Urine:1125]    Safety Concerns: At Risk for Falls    Impairments/Disabilities:      None    Nutrition Therapy:  Current Nutrition Therapy:   - Oral Diet:  Carb Control 4 carbs/meal (1800kcals/day)    Routes of Feeding: Oral  Liquids: No Restrictions  Daily Fluid Restriction: no  Last Modified Barium Swallow with Video (Video Swallowing Test): not done    Treatments at the Time of Hospital Discharge:   Respiratory Treatments: None  Oxygen Therapy:  is not on home oxygen therapy.   Ventilator:    - No ventilator support    Rehab Therapies: Physical Therapy and Occupational Therapy  Weight Bearing Status/Restrictions: No weight bearing restirctions  Other Medical Equipment (for information only, NOT a DME order):  walker  Other Treatments: None    Patient's personal belongings (please select all that are sent with patient):  None    RN SIGNATURE:  Electronically signed by Ashwini Augustin RN on 5/28/19 at 5:28 PM    CASE MANAGEMENT/SOCIAL WORK SECTION    Inpatient Status Date: 5/22/19    Readmission Risk Assessment Score:  Readmission Risk              Risk of Unplanned Readmission:        30         Discharging to Facility/ Agency   [facility]   [facility]  Address: [phone]  Phone:   Fax: [phone]  ·       / signature: Electronically signed by Roman Crawley RN on 5/28/19 at 12:21 PM    PHYSICIAN SECTION    Prognosis: Fair    Condition at Discharge: Stable    Rehab Potential (if transferring to Rehab): Fair    Recommended Labs or Other Treatments After Discharge:     Recommended Follow-up, Labs or Other Treatments After Discharge:    Repeat cbc/bmp in 1 week  Should home care be needed after discharge from the [facility] please arrange through [facility]. P: [phone]  F: [phone]               Physician Certification: I certify the above information and transfer of Adriana Arriaga  is necessary for the continuing treatment of the diagnosis listed and that she requires [place] for less 30 days.      Update Admission H&P: No change in H&P    PHYSICIAN SIGNATURE:  Electronically signed by Richard Perez MD on 5/28/19 at 3:34 PM

## 2019-05-24 NOTE — PROGRESS NOTES
Pt states that she feels like she's having an asthma attack. Lungs clear. Pt with a dry, wheezy, non-productive cough and is SOB. SpO2 92-94% on room air. 2LO2 NC placed for comfort. RT called for breathing treatment and to place pt on her nightly BiPAP. Will monitor.

## 2019-05-24 NOTE — PROGRESS NOTES
Took pt off of her BiPAP per pt request. Ambulated pt to the restroom and back to bed. Pt tolerated well. SpO2 92% on room air. Will monitor.

## 2019-05-24 NOTE — PROGRESS NOTES
05/23/19 0177   NIV Type   Equipment Type V60   Mode BIPAP   Mask Type Full face mask   Mask Size Medium   Bonnet size Medium   Settings/Measurements   Comfort Level Good   Using Accessory Muscles No   IPAP 12 cmH20   EPAP 6 cmH2O   Rate Ordered 12   Resp 18   SpO2 95   FiO2  30 %   Vt Exhaled 512 mL   Mask Leak (lpm) 10 lpm   Skin Protection for O2 Device Yes   Breath Sounds   Right Upper Lobe Diminished   Right Middle Lobe Diminished   Right Lower Lobe Diminished   Left Upper Lobe Diminished   Left Lower Lobe Diminished   Alarm Settings   Alarms On Y

## 2019-05-24 NOTE — PROGRESS NOTES
Hospitalist Progress Note      PCP: Madina Spears MD    Date of Admission: 5/22/2019     Chief Complaint: Shortness of breath    Hospital Course:   See H&P     Subjective:   Patient is up in bed, comfortable, not in distress. Denies any chest pain, shortness of breath. Feeling better today. No new event overnight noted. Medications:  Reviewed    Infusion Medications    dextrose       Scheduled Medications    doxycycline hyclate  100 mg Oral 2 times per day    aspirin EC  81 mg Oral Daily    atorvastatin  20 mg Oral Nightly    fluticasone  2 puff Inhalation BID    carbidopa-levodopa  1 tablet Oral TID    DULoxetine  60 mg Oral Daily    apixaban  2.5 mg Oral BID    fluticasone  2 spray Each Nostril Daily    furosemide  40 mg Oral Daily    gabapentin  100 mg Oral QPM    ipratropium-albuterol  1 vial Inhalation Q4H WA    lamoTRIgine  50 mg Oral BID    metoprolol tartrate  50 mg Oral BID    montelukast  10 mg Oral Nightly    therapeutic multivitamin-minerals  1 tablet Oral Daily    propafenone  150 mg Oral 3 times per day    olodaterol  2 puff Inhalation Daily    methylPREDNISolone  40 mg Intravenous Q12H    sodium chloride flush  10 mL Intravenous 2 times per day    insulin lispro  0-12 Units Subcutaneous TID WC    insulin lispro  0-6 Units Subcutaneous Nightly     PRN Meds: sodium chloride flush, magnesium hydroxide, ondansetron, glucose, dextrose, glucagon (rDNA), dextrose      Intake/Output Summary (Last 24 hours) at 5/24/2019 1338  Last data filed at 5/24/2019 1201  Gross per 24 hour   Intake 1790 ml   Output 1625 ml   Net 165 ml       Physical Exam Performed:    /88   Pulse 63   Temp 97.7 °F (36.5 °C) (Oral)   Resp 18   Ht 5' 6\" (1.676 m)   Wt 241 lb (109.3 kg)   SpO2 92%   BMI 38.90 kg/m²     General appearance: No apparent distress, appears stated age and cooperative. Morbidly obese  HEENT: Pupils equal, round, and reactive to light.  Conjunctivae/corneas Doxycycline pending PCT  Cont home inhalers, Flonase, Singulair  Clinically improving gradually, monitor. AK I on CKD III, nephrology consulted, monitor renal function daily. Clinically remained stable. Parkinson's disease, Cont home Sinemet     Atrial fibrillation,  Cont home Eliquis, Propafenone, Metoprolol    Chr diastolic HF, hx of mild to moder MR  Mild rise in creat, cont Lasix, hold home ARB, reeval in am.       DVT Prophylaxis: Eliquis   DIET CARB CONTROL;  Code Status: Full Code    PT/OT Eval Status: ordered  Dispo -in phil Bird MD

## 2019-05-24 NOTE — PROGRESS NOTES
05/24/19 0322   NIV Type   $NIV $Daily Charge   Equipment Type V60   Mode BIPAP   Mask Type Full face mask   Mask Size Medium   Bonnet size Medium   Settings/Measurements   Comfort Level Good   Using Accessory Muscles No   IPAP 12 cmH20   EPAP 6 cmH2O   Rate Ordered 12   Resp 16   SpO2 96   FiO2  30 %   Vt Exhaled 624 mL   Mask Leak (lpm) 11 lpm   Skin Protection for O2 Device Yes   Breath Sounds   Right Upper Lobe Diminished   Right Middle Lobe Diminished   Right Lower Lobe Diminished   Left Upper Lobe Diminished   Left Lower Lobe Diminished   Alarm Settings   Alarms On Y

## 2019-05-24 NOTE — CARE COORDINATION
[facility]    Spoke with pt and spouse at bedside about [facility], all questions answered. Pt to DC home with  SN/PT/OT, S3 Program as pt now has rec's for SNF. Pt and  state they are very familiar to [facility] and would also [facility].  will take pt's vitals and use their Internet. Pt aware agency will call to schedule SOC. Pt is agreeable to having a SOC within 2 days of DC, aware agency see's pt's on wkend and holidays. Demographics verified, pt lives at home with her spouse. Pt states she recently saw the NP at Dr. Kayley Swann practice PTA. Orders will be faxed to [facility]. Should pt dc over the weekend please fax facesheet, order, MICA, and H&P to [facility].      HOME HEALTH CARE: LEVEL 3 SAFETY     Home health agency to establish plan of care for patient over 60 day period   Nursing  Initial home SN evaluation visit to occur within 24-48 hours for:  medication management  VS and clinical assessment  S&S chronic disease exacerbation education + when to contact MD / NP  care coordination  Medication Reconciliation during 1st SN visit    PT/OT   Evaluations in home within 24-48 hours of discharge to include DME and home safety   Frontload therapy 5 days, then 3x a week   OT to evaluate if patient has [address] needs for personal care      evaluation within 24-48 hours to evaluate resources & insurance for potential [place], [place], LTC, and Medicaid options     PCP Visit scheduled within 3 - 7 days of hospital discharge        Haritha Alcala RN [address] [place] [address] with [address]

## 2019-05-24 NOTE — CARE COORDINATION
Chart reviewed day 2. Spoke with Dr. Paz Ordoñez. Patient likely to d/c home in am. Will go with [facility] provided by [facility].  Alexia Mosley RN

## 2019-05-25 ENCOUNTER — APPOINTMENT (OUTPATIENT)
Dept: GENERAL RADIOLOGY | Age: 79
DRG: 190 | End: 2019-05-25
Payer: MEDICARE

## 2019-05-25 LAB
ANION GAP SERPL CALCULATED.3IONS-SCNC: 10 MMOL/L (ref 3–16)
BUN BLDV-MCNC: 49 MG/DL (ref 7–20)
CALCIUM SERPL-MCNC: 9 MG/DL (ref 8.3–10.6)
CHLORIDE BLD-SCNC: 95 MMOL/L (ref 99–110)
CO2: 30 MMOL/L (ref 21–32)
CREAT SERPL-MCNC: 1.4 MG/DL (ref 0.6–1.2)
CULTURE, RESPIRATORY: NORMAL
GFR AFRICAN AMERICAN: 44
GFR NON-AFRICAN AMERICAN: 36
GLUCOSE BLD-MCNC: 132 MG/DL (ref 70–99)
GLUCOSE BLD-MCNC: 138 MG/DL (ref 70–99)
GLUCOSE BLD-MCNC: 148 MG/DL (ref 70–99)
GLUCOSE BLD-MCNC: 151 MG/DL (ref 70–99)
GLUCOSE BLD-MCNC: 203 MG/DL (ref 70–99)
GRAM STAIN RESULT: NORMAL
ORGANISM: ABNORMAL
PERFORMED ON: ABNORMAL
POTASSIUM SERPL-SCNC: 4.8 MMOL/L (ref 3.5–5.1)
PROCALCITONIN: 0.05 NG/ML (ref 0–0.15)
SODIUM BLD-SCNC: 135 MMOL/L (ref 136–145)
URINE CULTURE, ROUTINE: ABNORMAL
URINE CULTURE, ROUTINE: ABNORMAL

## 2019-05-25 PROCEDURE — 6370000000 HC RX 637 (ALT 250 FOR IP): Performed by: INTERNAL MEDICINE

## 2019-05-25 PROCEDURE — 84145 PROCALCITONIN (PCT): CPT

## 2019-05-25 PROCEDURE — 92526 ORAL FUNCTION THERAPY: CPT

## 2019-05-25 PROCEDURE — 6360000002 HC RX W HCPCS: Performed by: HOSPITALIST

## 2019-05-25 PROCEDURE — 1200000000 HC SEMI PRIVATE

## 2019-05-25 PROCEDURE — 94669 MECHANICAL CHEST WALL OSCILL: CPT

## 2019-05-25 PROCEDURE — 2580000003 HC RX 258: Performed by: HOSPITALIST

## 2019-05-25 PROCEDURE — 92610 EVALUATE SWALLOWING FUNCTION: CPT

## 2019-05-25 PROCEDURE — 80048 BASIC METABOLIC PNL TOTAL CA: CPT

## 2019-05-25 PROCEDURE — 6370000000 HC RX 637 (ALT 250 FOR IP): Performed by: HOSPITALIST

## 2019-05-25 PROCEDURE — 2580000003 HC RX 258: Performed by: INTERNAL MEDICINE

## 2019-05-25 PROCEDURE — 36415 COLL VENOUS BLD VENIPUNCTURE: CPT

## 2019-05-25 PROCEDURE — 6360000002 HC RX W HCPCS: Performed by: INTERNAL MEDICINE

## 2019-05-25 PROCEDURE — 94640 AIRWAY INHALATION TREATMENT: CPT

## 2019-05-25 PROCEDURE — 71045 X-RAY EXAM CHEST 1 VIEW: CPT

## 2019-05-25 RX ORDER — NIFEDIPINE 30 MG/1
30 TABLET, FILM COATED, EXTENDED RELEASE ORAL DAILY
Status: DISCONTINUED | OUTPATIENT
Start: 2019-05-25 | End: 2019-05-25

## 2019-05-25 RX ORDER — SPIRONOLACTONE 25 MG/1
25 TABLET ORAL DAILY
Status: DISCONTINUED | OUTPATIENT
Start: 2019-05-25 | End: 2019-05-28 | Stop reason: HOSPADM

## 2019-05-25 RX ADMIN — IPRATROPIUM BROMIDE AND ALBUTEROL SULFATE 3 ML: .5; 3 SOLUTION RESPIRATORY (INHALATION) at 19:58

## 2019-05-25 RX ADMIN — CARBIDOPA AND LEVODOPA 1 TABLET: 25; 100 TABLET ORAL at 22:25

## 2019-05-25 RX ADMIN — INSULIN LISPRO 2 UNITS: 100 INJECTION, SOLUTION INTRAVENOUS; SUBCUTANEOUS at 17:24

## 2019-05-25 RX ADMIN — FUROSEMIDE 40 MG: 40 TABLET ORAL at 17:24

## 2019-05-25 RX ADMIN — INSULIN LISPRO 2 UNITS: 100 INJECTION, SOLUTION INTRAVENOUS; SUBCUTANEOUS at 22:26

## 2019-05-25 RX ADMIN — FUROSEMIDE 40 MG: 40 TABLET ORAL at 09:40

## 2019-05-25 RX ADMIN — DOXYCYCLINE HYCLATE 100 MG: 100 TABLET, COATED ORAL at 09:40

## 2019-05-25 RX ADMIN — Medication 2 PUFF: at 08:23

## 2019-05-25 RX ADMIN — CARBIDOPA AND LEVODOPA 1 TABLET: 25; 100 TABLET ORAL at 13:32

## 2019-05-25 RX ADMIN — Medication 2 PUFF: at 19:58

## 2019-05-25 RX ADMIN — PROPAFENONE HYDROCHLORIDE 150 MG: 150 TABLET, FILM COATED ORAL at 13:32

## 2019-05-25 RX ADMIN — FLUTICASONE PROPIONATE 2 SPRAY: 50 SPRAY, METERED NASAL at 09:42

## 2019-05-25 RX ADMIN — LAMOTRIGINE 50 MG: 25 TABLET ORAL at 09:41

## 2019-05-25 RX ADMIN — OLODATEROL RESPIMAT INHALATION SPRAY 2 PUFF: 2.5 SPRAY, METERED RESPIRATORY (INHALATION) at 08:25

## 2019-05-25 RX ADMIN — DOXYCYCLINE HYCLATE 100 MG: 100 TABLET, COATED ORAL at 22:25

## 2019-05-25 RX ADMIN — SPIRONOLACTONE 25 MG: 25 TABLET ORAL at 17:24

## 2019-05-25 RX ADMIN — APIXABAN 2.5 MG: 2.5 TABLET, FILM COATED ORAL at 09:40

## 2019-05-25 RX ADMIN — IPRATROPIUM BROMIDE AND ALBUTEROL SULFATE 3 ML: .5; 3 SOLUTION RESPIRATORY (INHALATION) at 12:40

## 2019-05-25 RX ADMIN — METHYLPREDNISOLONE SODIUM SUCCINATE 40 MG: 40 INJECTION, POWDER, FOR SOLUTION INTRAMUSCULAR; INTRAVENOUS at 09:41

## 2019-05-25 RX ADMIN — METOPROLOL TARTRATE 50 MG: 50 TABLET ORAL at 22:25

## 2019-05-25 RX ADMIN — GABAPENTIN 100 MG: 100 CAPSULE ORAL at 17:24

## 2019-05-25 RX ADMIN — CEFTRIAXONE SODIUM 1 G: 1 INJECTION, POWDER, FOR SOLUTION INTRAMUSCULAR; INTRAVENOUS at 13:31

## 2019-05-25 RX ADMIN — PROPAFENONE HYDROCHLORIDE 150 MG: 150 TABLET, FILM COATED ORAL at 06:04

## 2019-05-25 RX ADMIN — IPRATROPIUM BROMIDE AND ALBUTEROL SULFATE 3 ML: .5; 3 SOLUTION RESPIRATORY (INHALATION) at 16:06

## 2019-05-25 RX ADMIN — CARBIDOPA AND LEVODOPA 1 TABLET: 25; 100 TABLET ORAL at 09:40

## 2019-05-25 RX ADMIN — ATORVASTATIN CALCIUM 20 MG: 10 TABLET, FILM COATED ORAL at 22:26

## 2019-05-25 RX ADMIN — ASPIRIN 81 MG: 81 TABLET, COATED ORAL at 09:40

## 2019-05-25 RX ADMIN — MONTELUKAST 10 MG: 10 TABLET, FILM COATED ORAL at 22:25

## 2019-05-25 RX ADMIN — METOPROLOL TARTRATE 50 MG: 50 TABLET ORAL at 09:41

## 2019-05-25 RX ADMIN — DULOXETINE HYDROCHLORIDE 60 MG: 60 CAPSULE, DELAYED RELEASE ORAL at 09:40

## 2019-05-25 RX ADMIN — APIXABAN 2.5 MG: 2.5 TABLET, FILM COATED ORAL at 22:26

## 2019-05-25 RX ADMIN — IPRATROPIUM BROMIDE AND ALBUTEROL SULFATE 3 ML: .5; 3 SOLUTION RESPIRATORY (INHALATION) at 08:23

## 2019-05-25 RX ADMIN — SODIUM CHLORIDE, PRESERVATIVE FREE 10 ML: 5 INJECTION INTRAVENOUS at 09:41

## 2019-05-25 RX ADMIN — Medication 1 TABLET: at 09:41

## 2019-05-25 RX ADMIN — SODIUM CHLORIDE, PRESERVATIVE FREE 10 ML: 5 INJECTION INTRAVENOUS at 22:26

## 2019-05-25 RX ADMIN — METHYLPREDNISOLONE SODIUM SUCCINATE 40 MG: 40 INJECTION, POWDER, FOR SOLUTION INTRAMUSCULAR; INTRAVENOUS at 22:26

## 2019-05-25 RX ADMIN — PROPAFENONE HYDROCHLORIDE 150 MG: 150 TABLET, FILM COATED ORAL at 22:25

## 2019-05-25 RX ADMIN — LAMOTRIGINE 50 MG: 25 TABLET ORAL at 22:32

## 2019-05-25 NOTE — PLAN OF CARE
Bed locked and in low position, bedside table and call light within reach, nonskid socks and bed alarm on. Uses call light appropriately. Safety maintained.

## 2019-05-25 NOTE — PROGRESS NOTES
Hospitalist Progress Note      PCP: Delmi Gastelum MD    Date of Admission: 5/22/2019    Chief Complaint: cough    Hospital Course: worsening dry cough since yesterday. Sob is better, but cough worse and gets worse with exertion.  Not dependent on posture     Subjective: Dry cough, no chest pain, no shortness of breath, no nausea or vomiting       Medications:  Reviewed    Infusion Medications    dextrose       Scheduled Medications    cefTRIAXone (ROCEPHIN) IV  1 g Intravenous Q24H    spironolactone  25 mg Oral Daily    furosemide  40 mg Oral BID    doxycycline hyclate  100 mg Oral 2 times per day    aspirin EC  81 mg Oral Daily    atorvastatin  20 mg Oral Nightly    fluticasone  2 puff Inhalation BID    carbidopa-levodopa  1 tablet Oral TID    DULoxetine  60 mg Oral Daily    apixaban  2.5 mg Oral BID    fluticasone  2 spray Each Nostril Daily    gabapentin  100 mg Oral QPM    ipratropium-albuterol  1 vial Inhalation Q4H WA    lamoTRIgine  50 mg Oral BID    metoprolol tartrate  50 mg Oral BID    montelukast  10 mg Oral Nightly    therapeutic multivitamin-minerals  1 tablet Oral Daily    propafenone  150 mg Oral 3 times per day    olodaterol  2 puff Inhalation Daily    methylPREDNISolone  40 mg Intravenous Q12H    sodium chloride flush  10 mL Intravenous 2 times per day    insulin lispro  0-12 Units Subcutaneous TID WC    insulin lispro  0-6 Units Subcutaneous Nightly     PRN Meds: sodium chloride flush, magnesium hydroxide, ondansetron, glucose, dextrose, glucagon (rDNA), dextrose      Intake/Output Summary (Last 24 hours) at 5/25/2019 1648  Last data filed at 5/25/2019 1207  Gross per 24 hour   Intake 600 ml   Output 1525 ml   Net -925 ml       Physical Exam Performed:    BP (!) 188/86   Pulse 68   Temp 97.8 °F (36.6 °C) (Oral)   Resp 18   Ht 5' 6\" (1.676 m)   Wt 241 lb (109.3 kg)   SpO2 94%   BMI 38.90 kg/m²     General appearance: No apparent distress, appears stated age and cooperative. HEENT: Pupils equal, round, and reactive to light. Conjunctivae/corneas clear. Neck: Supple, with full range of motion. No jugular venous distention. Trachea midline. Respiratory:  Normal respiratory effort. Few rhonchi and crackles  Cardiovascular: Regular rate and rhythm with normal S1/S2 without murmurs, rubs or gallops. Abdomen: Soft, non-tender, non-distended with normal bowel sounds. Musculoskeletal: No clubbing, cyanosis or edema bilaterally. Full range of motion without deformity. Skin: Skin color, texture, turgor normal.  No rashes or lesions. Neurologic:  Neurovascularly intact without any focal sensory/motor deficits. Cranial nerves: II-XII intact, grossly non-focal.  Psychiatric: Alert and oriented, thought content appropriate, normal insight  Capillary Refill: Brisk,< 3 seconds   Peripheral Pulses: +2 palpable, equal bilaterally       Labs:   Recent Labs     05/23/19  0539   WBC 12.1*   HGB 10.9*   HCT 34.7*        Recent Labs     05/23/19  0539 05/24/19  0643 05/25/19  1133    133* 135*   K 5.4* 4.9 4.8   CL 94* 92* 95*   CO2 32 31 30   BUN 45* 49* 49*   CREATININE 1.8* 1.5* 1.4*   CALCIUM 8.9 8.8 9.0     No results for input(s): AST, ALT, BILIDIR, BILITOT, ALKPHOS in the last 72 hours. No results for input(s): INR in the last 72 hours. No results for input(s): Avril Southerly in the last 72 hours. Urinalysis:      Lab Results   Component Value Date    NITRU POSITIVE 05/22/2019    WBCUA >100 05/22/2019    BACTERIA 4+ 05/22/2019    RBCUA 0-2 05/22/2019    BLOODU Negative 05/22/2019    SPECGRAV 1.025 05/22/2019    GLUCOSEU Negative 05/22/2019       Radiology:  XR CHEST PORTABLE   Final Result   Increased opacity left upper lung possibly pneumonia. Follow-up after   appropriate therapy is advised. XR CHEST PORTABLE   Final Result   No evidence of acute cardiopulmonary disease.                  Assessment/Plan:    Active Hospital Problems    Diagnosis    Acute respiratory failure with hypoxemia (HCC) [J96.01]     PLAN:    Pneumonia  Diagnosed today, based on worsening cough and new pulmonary infiltrate  Continue ceftriaxone and doxycycline    COPD exacerbation  Continue steroids and HHNs  Better today    Acute kidney injury  Better with hydration  Nephrology following. Input appreciated    Dyslipidemia  Continue atorvastatin    Hypertension:  BP is elevated.  Patient is off ACEI due to FARHEEN  Nephrology following  Spironolactone added today    D/w patient and  in the room  D/w nursing    DVT Prophylaxis: Eliquis  Diet: DIET CARB CONTROL;  Code Status: Full Code    PT/OT Eval Status: in process    Dispo - inpatient 1-2 more days    Joel Al MD

## 2019-05-25 NOTE — PROGRESS NOTES
Diagnosis: Swallow function appears grossly intact  Dysphagia Outcome Severity Scale: Level 6: Within functional limits/Modified independence     Treatment Plan  Requires SLP Intervention: Yes  Duration/Frequency of Treatment: 3x/wk for 1 week          Recommended Diet and Intervention  Diet Solids Recommendation: Regular (avoid mixed consistencies)  Liquid Consistency Recommendation: Thin  Recommended Form of Meds: Meds in puree(followed by sips of water)          Compensatory Swallowing Strategies  · Small bites/sips;  · Alternate solids and liquids;  · Eat/Feed slowly;  · No straws;  · Remain upright for 30-45 minutes after meals;  · Upright as possible for all oral intake    Treatment/Goals  Short-term Goals  Timeframe for Short-term Goals: 1 week, 3x/wk  1. The patient will tolerate recommended diet without observed clinical signs of aspiration;  2. The patient will recall and perform compensatory strategies, with no cues. Long-term Goals  Timeframe for Long-term Goals: 1-2 weeks  1. Pt will tolerate recommended diet without observed s/s of aspiration. General  Chart Reviewed: Yes  Behavior/Cognition: Alert; Cooperative;Pleasant mood  Respiratory Status: Room air  Communication Observation: Functional  Follows Directions: Simple  Dentition: Adequate  Patient Positioning: Upright in bed  Baseline Vocal Quality: Normal  Volitional Cough: Weak;Congested  Prior Dysphagia History: Pt reports that she had a video swallow study with a [employer] company in 01/2019, which revealed no swallowing issues.  reports that the evaluating therapist recommended that pt chew her foods well and alternate solids and liquids. Consistencies Administered: Reg solid;Puree;Mechanical soft; Thin - teaspoon; Thin - cup; Thin - straw           Vision/Hearing  Vision  Vision: Impaired  Hearing  Hearing: Within functional limits    Oral Motor Deficits  Oral/Motor  Oral Motor:  Within functional limits    Oral Phase Dysfunction  Oral Phase  Oral Phase: WNL     Indicators of Pharyngeal Phase Dysfunction   Pharyngeal Phase  Pharyngeal Phase: WNL  Pharyngeal Phase   Pharyngeal: RR 24/min with O2 sat of 93% prior to po trials. Pt has habitual, dry cough prior to po trials. RRN reports that lung sounds are \"diminished. \" NO overt s/s of aspiration, but pt executes intermittent dry cough throughout all po trials. Cough is never wet in nature. Pt able to cough up phlegm if she coughs forcefully. O2 sat stable 93-94% throughout evaluation. Pt reports mild pharyngeal globus sensation with mechanical soft food trials but not regular solids, and reports that liquid wash does assist with clearance of this sensation. No overt s/s of aspiration with thin liquids. RR 20/min following po trials. Prognosis  Prognosis  Prognosis for safe diet advancement: fair  Individuals consulted  Consulted and agree with results and recommendations: Patient; Family member;RN  Family member consulted: spouse    Education  Patient Education: Dysphagia Tx: Education with pt, spouse, and RN regarding results of BSE, aspiration precautions, and SLP plan of care  Patient Education Response: Verbalizes understanding;Demonstrated understanding  Safety Devices in place: Yes  Type of devices: All fall risk precautions in place; Bed alarm in place;Call light within reach         Therapy Time  SLP Individual Minutes  Time In: 6771  Time Out: [address]  Minutes: [facility].  [id number]  Speech-Language Pathologist  (desk #): [phone]      5/25/2019 2:31 PM

## 2019-05-25 NOTE — PROGRESS NOTES
Pt axo. Assessment completed as charted. Pt c/o dry, non-productive cough and pleuritic chest pain. Md informed. Denies additional needs at this time. Will continue to monitor. Call light in reach.

## 2019-05-25 NOTE — PROGRESS NOTES
Patient Name: Jennifer Thomas                                                    Primary Physician: Mary Ann Orellana MD  Admitting Dx: Acute respiratory failure with hypoxemia [facility]) [J96.01]  Acute respiratory failure with hypoxemia [facility]) [J96.01]    Dr. Edouard Elias      Neph[facility]  [facility]  [email or web address]                                       Interval Plan:     · SCr improving now 1.4 and back to baseline from 1.8. Cautious with neurontin causing AMS in renal insufficiency. · Remains on lasix 40 mg bid and UOP is 1.6 L  · BP remains very high 170-180s. On Lopressor 50 mg and Lasix. Add Spironolactone 25 mg w/ hx of BETSY. Holding on ACEi or ARB for now. · Only ATBX now is Rocephin. Assessment:     FARHEEN w/ CKD 3  · Baseline 1.2-1.5  · U/A possible UTI  · P/C 400 mg   Parkinson's disease  · Sinemet  Atrial fibrillation  · Cont home Eliquis, Propafenone, Metoprolol  · HFpEF hx of mild to moder MR cont Lasix  ·  hold home ARB  Resp Failure  · On ATBX with IV solumedrol and breathing treatments. Please call our office at [phone] with any questions or contact me directly. Subjective:     CC / Reason for Consult: FARHEEN w/ CKD    HPI/PMH:  70 y. o. female who presented to [facility] with worsening SOB.  Sx started couple weeks ago, saw PCP, CXR with suspected CHF, advised to double her Lasix x 3 days then reeval.  Notes some improvement in SOB with Lasix, incl 10 lb wt decline and less leg swelling.  Despite this persistent SOB, ANN.  Ongoing cough, difficulty clearing phlegm.  Presented to ED, hypoxic on RA, baseline uses BIPAP with 02 qhs alone.  Plan to admit for further workup/mgmt      ROS / Interval Hx: Patient seen in room with nursing present. No new issues.       Objective:        Gen: alert, well appearing, and in no distress and oriented to person, place, and time     Neck:  supple, no significant adenopathy     Cardio: normal rate, regular rhythm, normal S1, S2, no murmurs, rubs, clicks or gallops      Resp: clear to auscultation, no wheezes, rales or rhonchi, symmetric air entry. GI:  soft, nontender, nondistended, no masses or organomegaly. Ext:  peripheral pulses normal, no pedal edema, no clubbing or cyanosis      MS: no joint tenderness, deformity or swelling      DERM: normal coloration and turgor, no rashesor bruising    Vitals:     BP (!) 188/86   Pulse 68   Temp 97.8 °F (36.6 °C) (Oral)   Resp 18   Ht 5' 6\" (1.676 m)   Wt 241 lb (109.3 kg)   SpO2 95%   BMI 38.90 kg/m²      I/Os: Reviewed    Meds: Reviewed. Please see plan for changes made.     Labs:    Lab Results   Component Value Date    WBC 12.1 05/23/2019    HGB 10.9 05/23/2019    HCT 34.7 05/23/2019    MCV 86.2 05/23/2019     05/23/2019      Lab Results   Component Value Date    CREATININE 1.4 05/25/2019    BUN 49 05/25/2019     05/25/2019    K 4.8 05/25/2019    K 5.4 05/23/2019    CL 95 05/25/2019    CO2 30 05/25/2019     No components found for: GLU   Lab Results   Component Value Date    CALCIUM 9.0 05/25/2019    PHOS 3.5 05/13/2019      No results found for: [id number]   Lab Results   Component Value Date    IRON 45 05/11/2018    TIBC 255 05/11/2018    FERRITIN 43.9 05/11/2018      Lab Results   Component Value Date    MICROALBUR 20 01/27/2015

## 2019-05-26 LAB
ANION GAP SERPL CALCULATED.3IONS-SCNC: 10 MMOL/L (ref 3–16)
BUN BLDV-MCNC: 50 MG/DL (ref 7–20)
CALCIUM SERPL-MCNC: 9.4 MG/DL (ref 8.3–10.6)
CHLORIDE BLD-SCNC: 96 MMOL/L (ref 99–110)
CO2: 32 MMOL/L (ref 21–32)
CREAT SERPL-MCNC: 1.4 MG/DL (ref 0.6–1.2)
GFR AFRICAN AMERICAN: 44
GFR NON-AFRICAN AMERICAN: 36
GLUCOSE BLD-MCNC: 125 MG/DL (ref 70–99)
GLUCOSE BLD-MCNC: 157 MG/DL (ref 70–99)
GLUCOSE BLD-MCNC: 161 MG/DL (ref 70–99)
GLUCOSE BLD-MCNC: 164 MG/DL (ref 70–99)
GLUCOSE BLD-MCNC: 227 MG/DL (ref 70–99)
PERFORMED ON: ABNORMAL
POTASSIUM SERPL-SCNC: 4.9 MMOL/L (ref 3.5–5.1)
SODIUM BLD-SCNC: 138 MMOL/L (ref 136–145)

## 2019-05-26 PROCEDURE — 6370000000 HC RX 637 (ALT 250 FOR IP): Performed by: INTERNAL MEDICINE

## 2019-05-26 PROCEDURE — 6360000002 HC RX W HCPCS: Performed by: INTERNAL MEDICINE

## 2019-05-26 PROCEDURE — 1200000000 HC SEMI PRIVATE

## 2019-05-26 PROCEDURE — 94640 AIRWAY INHALATION TREATMENT: CPT

## 2019-05-26 PROCEDURE — 2580000003 HC RX 258

## 2019-05-26 PROCEDURE — 94660 CPAP INITIATION&MGMT: CPT

## 2019-05-26 PROCEDURE — 2580000003 HC RX 258: Performed by: INTERNAL MEDICINE

## 2019-05-26 PROCEDURE — 94761 N-INVAS EAR/PLS OXIMETRY MLT: CPT

## 2019-05-26 PROCEDURE — 6360000002 HC RX W HCPCS: Performed by: HOSPITALIST

## 2019-05-26 PROCEDURE — 94669 MECHANICAL CHEST WALL OSCILL: CPT

## 2019-05-26 PROCEDURE — 2580000003 HC RX 258: Performed by: HOSPITALIST

## 2019-05-26 PROCEDURE — 36415 COLL VENOUS BLD VENIPUNCTURE: CPT

## 2019-05-26 PROCEDURE — 6370000000 HC RX 637 (ALT 250 FOR IP): Performed by: HOSPITALIST

## 2019-05-26 PROCEDURE — 80048 BASIC METABOLIC PNL TOTAL CA: CPT

## 2019-05-26 RX ORDER — HYDRALAZINE HYDROCHLORIDE 20 MG/ML
10 INJECTION INTRAMUSCULAR; INTRAVENOUS EVERY 6 HOURS PRN
Status: DISCONTINUED | OUTPATIENT
Start: 2019-05-26 | End: 2019-05-28 | Stop reason: HOSPADM

## 2019-05-26 RX ORDER — NIFEDIPINE 30 MG/1
30 TABLET, FILM COATED, EXTENDED RELEASE ORAL DAILY
Status: DISCONTINUED | OUTPATIENT
Start: 2019-05-26 | End: 2019-05-27

## 2019-05-26 RX ORDER — PREDNISONE 20 MG/1
40 TABLET ORAL DAILY
Status: DISCONTINUED | OUTPATIENT
Start: 2019-05-27 | End: 2019-05-28 | Stop reason: HOSPADM

## 2019-05-26 RX ORDER — SODIUM CHLORIDE 9 MG/ML
INJECTION, SOLUTION INTRAVENOUS
Status: COMPLETED
Start: 2019-05-26 | End: 2019-05-26

## 2019-05-26 RX ADMIN — PROPAFENONE HYDROCHLORIDE 150 MG: 150 TABLET, FILM COATED ORAL at 06:11

## 2019-05-26 RX ADMIN — IPRATROPIUM BROMIDE AND ALBUTEROL SULFATE 3 ML: .5; 3 SOLUTION RESPIRATORY (INHALATION) at 15:36

## 2019-05-26 RX ADMIN — METHYLPREDNISOLONE SODIUM SUCCINATE 40 MG: 40 INJECTION, POWDER, FOR SOLUTION INTRAMUSCULAR; INTRAVENOUS at 09:20

## 2019-05-26 RX ADMIN — DULOXETINE HYDROCHLORIDE 60 MG: 60 CAPSULE, DELAYED RELEASE ORAL at 09:19

## 2019-05-26 RX ADMIN — ASPIRIN 81 MG: 81 TABLET, COATED ORAL at 09:20

## 2019-05-26 RX ADMIN — METOPROLOL TARTRATE 50 MG: 50 TABLET ORAL at 20:57

## 2019-05-26 RX ADMIN — CARBIDOPA AND LEVODOPA 1 TABLET: 25; 100 TABLET ORAL at 20:58

## 2019-05-26 RX ADMIN — APIXABAN 2.5 MG: 2.5 TABLET, FILM COATED ORAL at 09:19

## 2019-05-26 RX ADMIN — SODIUM CHLORIDE, PRESERVATIVE FREE 10 ML: 5 INJECTION INTRAVENOUS at 20:58

## 2019-05-26 RX ADMIN — PROPAFENONE HYDROCHLORIDE 150 MG: 150 TABLET, FILM COATED ORAL at 14:28

## 2019-05-26 RX ADMIN — FUROSEMIDE 40 MG: 40 TABLET ORAL at 17:42

## 2019-05-26 RX ADMIN — SODIUM CHLORIDE, PRESERVATIVE FREE 10 ML: 5 INJECTION INTRAVENOUS at 09:20

## 2019-05-26 RX ADMIN — SODIUM CHLORIDE 100 ML: 900 INJECTION INTRAVENOUS at 12:16

## 2019-05-26 RX ADMIN — DOXYCYCLINE HYCLATE 100 MG: 100 TABLET, COATED ORAL at 09:20

## 2019-05-26 RX ADMIN — FLUTICASONE PROPIONATE 2 SPRAY: 50 SPRAY, METERED NASAL at 09:28

## 2019-05-26 RX ADMIN — IPRATROPIUM BROMIDE AND ALBUTEROL SULFATE 3 ML: .5; 3 SOLUTION RESPIRATORY (INHALATION) at 20:41

## 2019-05-26 RX ADMIN — FUROSEMIDE 40 MG: 40 TABLET ORAL at 09:19

## 2019-05-26 RX ADMIN — GABAPENTIN 100 MG: 100 CAPSULE ORAL at 17:42

## 2019-05-26 RX ADMIN — DOXYCYCLINE HYCLATE 100 MG: 100 TABLET, COATED ORAL at 20:58

## 2019-05-26 RX ADMIN — INSULIN LISPRO 2 UNITS: 100 INJECTION, SOLUTION INTRAVENOUS; SUBCUTANEOUS at 09:18

## 2019-05-26 RX ADMIN — SPIRONOLACTONE 25 MG: 25 TABLET ORAL at 09:20

## 2019-05-26 RX ADMIN — MONTELUKAST 10 MG: 10 TABLET, FILM COATED ORAL at 20:57

## 2019-05-26 RX ADMIN — CARBIDOPA AND LEVODOPA 1 TABLET: 25; 100 TABLET ORAL at 09:20

## 2019-05-26 RX ADMIN — CARBIDOPA AND LEVODOPA 1 TABLET: 25; 100 TABLET ORAL at 14:27

## 2019-05-26 RX ADMIN — Medication 2 PUFF: at 08:08

## 2019-05-26 RX ADMIN — APIXABAN 2.5 MG: 2.5 TABLET, FILM COATED ORAL at 20:57

## 2019-05-26 RX ADMIN — LAMOTRIGINE 50 MG: 25 TABLET ORAL at 20:57

## 2019-05-26 RX ADMIN — IPRATROPIUM BROMIDE AND ALBUTEROL SULFATE 3 ML: .5; 3 SOLUTION RESPIRATORY (INHALATION) at 08:08

## 2019-05-26 RX ADMIN — Medication 2 PUFF: at 20:41

## 2019-05-26 RX ADMIN — Medication 1 TABLET: at 09:20

## 2019-05-26 RX ADMIN — INSULIN LISPRO 1 UNITS: 100 INJECTION, SOLUTION INTRAVENOUS; SUBCUTANEOUS at 20:58

## 2019-05-26 RX ADMIN — ATORVASTATIN CALCIUM 20 MG: 10 TABLET, FILM COATED ORAL at 20:57

## 2019-05-26 RX ADMIN — LAMOTRIGINE 50 MG: 25 TABLET ORAL at 09:20

## 2019-05-26 RX ADMIN — METHYLPREDNISOLONE SODIUM SUCCINATE 40 MG: 40 INJECTION, POWDER, FOR SOLUTION INTRAMUSCULAR; INTRAVENOUS at 20:58

## 2019-05-26 RX ADMIN — NIFEDIPINE 30 MG: 30 TABLET, FILM COATED, EXTENDED RELEASE ORAL at 14:31

## 2019-05-26 RX ADMIN — CEFTRIAXONE SODIUM 1 G: 1 INJECTION, POWDER, FOR SOLUTION INTRAMUSCULAR; INTRAVENOUS at 12:16

## 2019-05-26 RX ADMIN — OLODATEROL RESPIMAT INHALATION SPRAY 2 PUFF: 2.5 SPRAY, METERED RESPIRATORY (INHALATION) at 08:08

## 2019-05-26 RX ADMIN — IPRATROPIUM BROMIDE AND ALBUTEROL SULFATE 3 ML: .5; 3 SOLUTION RESPIRATORY (INHALATION) at 11:33

## 2019-05-26 RX ADMIN — METOPROLOL TARTRATE 50 MG: 50 TABLET ORAL at 09:20

## 2019-05-26 RX ADMIN — INSULIN LISPRO 4 UNITS: 100 INJECTION, SOLUTION INTRAVENOUS; SUBCUTANEOUS at 12:16

## 2019-05-26 RX ADMIN — PROPAFENONE HYDROCHLORIDE 150 MG: 150 TABLET, FILM COATED ORAL at 20:57

## 2019-05-26 NOTE — PROGRESS NOTES
Pt axo. Assessment completed as charted. Pt states she feels much improved and cough has decreased. Denies additional needs at this time. Will continue to monitor. Call light in reach.

## 2019-05-26 NOTE — PROGRESS NOTES
05/25/19 2127   NIV Type   NIV Started Yes   Equipment Type V60   Mode BIPAP   Mask Type Full face mask   Mask Size Medium   Bonnet size Medium   Settings/Measurements   Comfort Level Good   Using Accessory Muscles No   IPAP 12 cmH20   EPAP 6 cmH2O   Resp 21   SpO2 96   FiO2  30 %   Vt Exhaled 390 mL   Skin Protection for O2 Device Yes   Alarm Settings   Alarms On Y   Press Low Alarm 6 cmH2O   High Pressure Alarm 30 cmH2O   Apnea (secs) 20 secs   Resp Rate Low Alarm 6   High Respiratory Rate 45 br/min

## 2019-05-26 NOTE — PROGRESS NOTES
Hospitalist Progress Note      PCP: Gladis Miramontes MD    Date of Admission: 5/22/2019    Chief Complaint: cough    Hospital Course: seen yesterday,cough addressed and diagnosed with pneumonia. Doing better today. Cough has resolved. Breathing better.  Overall more comfortable    Subjective: no more cough cough, no chest pain, no shortness of breath, no nausea or vomiting       Medications:  Reviewed    Infusion Medications    dextrose       Scheduled Medications    cefTRIAXone (ROCEPHIN) IV  1 g Intravenous Q24H    spironolactone  25 mg Oral Daily    furosemide  40 mg Oral BID    doxycycline hyclate  100 mg Oral 2 times per day    aspirin EC  81 mg Oral Daily    atorvastatin  20 mg Oral Nightly    fluticasone  2 puff Inhalation BID    carbidopa-levodopa  1 tablet Oral TID    DULoxetine  60 mg Oral Daily    apixaban  2.5 mg Oral BID    fluticasone  2 spray Each Nostril Daily    gabapentin  100 mg Oral QPM    ipratropium-albuterol  1 vial Inhalation Q4H WA    lamoTRIgine  50 mg Oral BID    metoprolol tartrate  50 mg Oral BID    montelukast  10 mg Oral Nightly    therapeutic multivitamin-minerals  1 tablet Oral Daily    propafenone  150 mg Oral 3 times per day    olodaterol  2 puff Inhalation Daily    methylPREDNISolone  40 mg Intravenous Q12H    sodium chloride flush  10 mL Intravenous 2 times per day    insulin lispro  0-12 Units Subcutaneous TID WC    insulin lispro  0-6 Units Subcutaneous Nightly     PRN Meds: hydrALAZINE, sodium chloride flush, magnesium hydroxide, ondansetron, glucose, dextrose, glucagon (rDNA), dextrose      Intake/Output Summary (Last 24 hours) at 5/26/2019 0912  Last data filed at 5/26/2019 0525  Gross per 24 hour   Intake 720 ml   Output 1475 ml   Net -755 ml       Physical Exam Performed:    BP (!) 152/64   Pulse 69   Temp 97.6 °F (36.4 °C) (Oral)   Resp 19   Ht 5' 6\" (1.676 m)   Wt 241 lb (109.3 kg)   SpO2 95%   BMI 38.90 kg/m²     General appearance: No apparent distress, appears stated age and cooperative. HEENT: Pupils equal, round, and reactive to light. Conjunctivae/corneas clear. Neck: Supple, with full range of motion. No jugular venous distention. Trachea midline. Respiratory:  Normal respiratory effort. Lungs are clear today  Cardiovascular: Regular rate and rhythm with normal S1/S2 without murmurs, rubs or gallops. Abdomen: Soft, non-tender, non-distended with normal bowel sounds. Musculoskeletal: No clubbing, cyanosis or edema bilaterally. Full range of motion without deformity. Skin: Skin color, texture, turgor normal.  No rashes or lesions. Neurologic:  Neurovascularly intact without any focal sensory/motor deficits. Cranial nerves: II-XII intact, grossly non-focal.  Psychiatric: Alert and oriented, thought content appropriate, normal insight  Capillary Refill: Brisk,< 3 seconds   Peripheral Pulses: +2 palpable, equal bilaterally       Labs:   No results for input(s): WBC, HGB, HCT, PLT in the last 72 hours. Recent Labs     05/24/19  0643 05/25/19  1133 05/26/19  0508   * 135* 138   K 4.9 4.8 4.9   CL 92* 95* 96*   CO2 31 30 32   BUN 49* 49* 50*   CREATININE 1.5* 1.4* 1.4*   CALCIUM 8.8 9.0 9.4     No results for input(s): AST, ALT, BILIDIR, BILITOT, ALKPHOS in the last 72 hours. No results for input(s): INR in the last 72 hours. No results for input(s): Lynne Dark in the last 72 hours. Urinalysis:      Lab Results   Component Value Date    NITRU POSITIVE 05/22/2019    WBCUA >100 05/22/2019    BACTERIA 4+ 05/22/2019    RBCUA 0-2 05/22/2019    BLOODU Negative 05/22/2019    SPECGRAV 1.025 05/22/2019    GLUCOSEU Negative 05/22/2019       Radiology:  XR CHEST PORTABLE   Final Result   Increased opacity left upper lung possibly pneumonia. Follow-up after   appropriate therapy is advised. XR CHEST PORTABLE   Final Result   No evidence of acute cardiopulmonary disease.                  Assessment/Plan:    KRISTA/Anthony Martines [address] Problems    Diagnosis    Acute respiratory failure with hypoxemia (HCC) [J96.01]     PLAN:    Pneumonia  Diagnosed yesterday  Feels better today, after addition of ceftriaxone  Continue same another day    COPD exacerbation  Continue steroids and HHNs  Lungs are clear today  Continue same management, except switching to po prednisone tomorrow    Acute kidney injury  Stable compared to yesterday  Nephrology following. Input appreciated    Dyslipidemia  Continue atorvastatin    Hypertension:  BP is elevated.  Patient is off ACEI due to FARHEEN  Nephrology following  Spironolactone added yesterday  Defer to nephrology    D/w patient and  in the room  D/w nursing    DVT Prophylaxis: Eliquis  Diet: DIET CARB CONTROL;  Code Status: Full Code    PT/OT Eval Status: in process    Dispo - Discharge home Davi Lozada MD

## 2019-05-26 NOTE — PLAN OF CARE
Problem: Falls - Risk of:  Goal: Will remain free from falls  Description  Will remain free from falls  Outcome: Ongoing  Note:   Pt remained free of falls. Call light within reach. Bed alarm on. Non skid footwear in place. Bed locked and in lowest position. Will continue to monitor.

## 2019-05-26 NOTE — PROGRESS NOTES
Patient Name: Adriana Arriaga                                                    Primary Physician: Sridhar Arriaga MD  Admitting Dx: Acute respiratory failure with hypoxemia [facility]) [J96.01]  Acute respiratory failure with hypoxemia [facility]) [J96.01]    Dr. Mihir Pat      Nephrology [place] Progress Note  [facility] Nephrology  [email or web address] [place]                                       Interval Plan:     · SCr stable 1.4 and back to baseline from 1.8. Cautious with neurontin causing AMS in renal insufficiency. · Remains on lasix 40 mg bid and UOP is 1.47 L  · BP remains very high 170-180s. On Lopressor 50 mg, Lasix, Spironolactone 25 mg w/ hx of BETSY. Holding on ACEi or ARB for now. Add Nifedipine 30 mg started today. · Only ATBX now is Rocephin. Assessment:     FARHEEN w/ CKD 3  · Baseline 1.2-1.5  · U/A possible UTI  · P/C 400 mg   Parkinson's disease  · Sinemet  Atrial fibrillation  · Cont home Eliquis, Propafenone, Metoprolol  · HFpEF hx of mild to moder MR cont Lasix  ·  hold home ARB  Resp Failure  · On ATBX with IV solumedrol and breathing treatments. Please call our office at [phone] with any questions or contact me directly. Subjective:     CC / Reason for Consult: FARHEEN w/ CKD    HPI/PMH:  70 y. o. female who presented to [facility] with worsening SOB.  Sx started couple weeks ago, saw PCP, CXR with suspected CHF, advised to double her Lasix x 3 days then reeval.  Notes some improvement in SOB with Lasix, incl 10 lb wt decline and less leg swelling.  Despite this persistent SOB, ANN.  Ongoing cough, difficulty clearing phlegm.  Presented to ED, hypoxic on RA, baseline uses BIPAP with 02 qhs alone.  Plan to admit for further workup/mgmt      ROS / Interval Hx: Patient seen in room with nursing and family present. No new issues.       Objective:        Gen: alert, well appearing, and in no distress and oriented to person, place, and time     Neck:  supple, no significant adenopathy Cardio: normal rate, regular rhythm, normal S1, S2, no murmurs, rubs, clicks or gallops      Resp: clear to auscultation, no wheezes, rales or rhonchi, coarse cough. GI:  soft, nontender, nondistended, no masses or organomegaly. Ext:  peripheral pulses normal, no pedal edema, no clubbing or cyanosis      MS: no joint tenderness, deformity or swelling      DERM: normal coloration and turgor, no rashesor bruising    Vitals:     BP (!) 170/85   Pulse 62   Temp 97.4 °F (36.3 °C) (Oral)   Resp 18   Ht 5' 6\" (1.676 m)   Wt 241 lb (109.3 kg)   SpO2 95%   BMI 38.90 kg/m²      I/Os: Reviewed    Meds: Reviewed. Please see plan for changes made.     Labs:    Lab Results   Component Value Date    WBC 12.1 05/23/2019    HGB 10.9 05/23/2019    HCT 34.7 05/23/2019    MCV 86.2 05/23/2019     05/23/2019      Lab Results   Component Value Date    CREATININE 1.4 05/26/2019    BUN 50 05/26/2019     05/26/2019    K 4.9 05/26/2019    K 5.4 05/23/2019    CL 96 05/26/2019    CO2 32 05/26/2019     No components found for: GLU   Lab Results   Component Value Date    CALCIUM 9.4 05/26/2019    PHOS 3.5 05/13/2019      No results found for: [id number]   Lab Results   Component Value Date    IRON 45 05/11/2018    TIBC 255 05/11/2018    FERRITIN 43.9 05/11/2018      Lab Results   Component Value Date    MICROALBUR 20 01/27/2015

## 2019-05-27 LAB
ANION GAP SERPL CALCULATED.3IONS-SCNC: 10 MMOL/L (ref 3–16)
BASOPHILS ABSOLUTE: 0 K/UL (ref 0–0.2)
BASOPHILS RELATIVE PERCENT: 0.3 %
BUN BLDV-MCNC: 54 MG/DL (ref 7–20)
CALCIUM SERPL-MCNC: 9.3 MG/DL (ref 8.3–10.6)
CHLORIDE BLD-SCNC: 94 MMOL/L (ref 99–110)
CO2: 31 MMOL/L (ref 21–32)
CREAT SERPL-MCNC: 1.5 MG/DL (ref 0.6–1.2)
EOSINOPHILS ABSOLUTE: 0 K/UL (ref 0–0.6)
EOSINOPHILS RELATIVE PERCENT: 0.2 %
GFR AFRICAN AMERICAN: 41
GFR NON-AFRICAN AMERICAN: 34
GLUCOSE BLD-MCNC: 133 MG/DL (ref 70–99)
GLUCOSE BLD-MCNC: 159 MG/DL (ref 70–99)
GLUCOSE BLD-MCNC: 164 MG/DL (ref 70–99)
GLUCOSE BLD-MCNC: 188 MG/DL (ref 70–99)
GLUCOSE BLD-MCNC: 213 MG/DL (ref 70–99)
HCT VFR BLD CALC: 35 % (ref 36–48)
HEMOGLOBIN: 11.2 G/DL (ref 12–16)
LYMPHOCYTES ABSOLUTE: 0.8 K/UL (ref 1–5.1)
LYMPHOCYTES RELATIVE PERCENT: 5.4 %
MCH RBC QN AUTO: 27.4 PG (ref 26–34)
MCHC RBC AUTO-ENTMCNC: 32 G/DL (ref 31–36)
MCV RBC AUTO: 85.7 FL (ref 80–100)
MONOCYTES ABSOLUTE: 0.5 K/UL (ref 0–1.3)
MONOCYTES RELATIVE PERCENT: 3.4 %
NEUTROPHILS ABSOLUTE: 14.1 K/UL (ref 1.7–7.7)
NEUTROPHILS RELATIVE PERCENT: 90.7 %
PDW BLD-RTO: 13.7 % (ref 12.4–15.4)
PERFORMED ON: ABNORMAL
PLATELET # BLD: 328 K/UL (ref 135–450)
PMV BLD AUTO: 9.3 FL (ref 5–10.5)
POTASSIUM SERPL-SCNC: 4.8 MMOL/L (ref 3.5–5.1)
RBC # BLD: 4.08 M/UL (ref 4–5.2)
SODIUM BLD-SCNC: 135 MMOL/L (ref 136–145)
WBC # BLD: 15.5 K/UL (ref 4–11)

## 2019-05-27 PROCEDURE — 1200000000 HC SEMI PRIVATE

## 2019-05-27 PROCEDURE — 94660 CPAP INITIATION&MGMT: CPT

## 2019-05-27 PROCEDURE — 94669 MECHANICAL CHEST WALL OSCILL: CPT

## 2019-05-27 PROCEDURE — 6370000000 HC RX 637 (ALT 250 FOR IP): Performed by: INTERNAL MEDICINE

## 2019-05-27 PROCEDURE — 2580000003 HC RX 258: Performed by: HOSPITALIST

## 2019-05-27 PROCEDURE — 2700000000 HC OXYGEN THERAPY PER DAY

## 2019-05-27 PROCEDURE — 6370000000 HC RX 637 (ALT 250 FOR IP): Performed by: HOSPITALIST

## 2019-05-27 PROCEDURE — 94761 N-INVAS EAR/PLS OXIMETRY MLT: CPT

## 2019-05-27 PROCEDURE — 6360000002 HC RX W HCPCS: Performed by: INTERNAL MEDICINE

## 2019-05-27 PROCEDURE — 94640 AIRWAY INHALATION TREATMENT: CPT

## 2019-05-27 PROCEDURE — 36415 COLL VENOUS BLD VENIPUNCTURE: CPT

## 2019-05-27 PROCEDURE — 80048 BASIC METABOLIC PNL TOTAL CA: CPT

## 2019-05-27 PROCEDURE — 85025 COMPLETE CBC W/AUTO DIFF WBC: CPT

## 2019-05-27 PROCEDURE — 97530 THERAPEUTIC ACTIVITIES: CPT

## 2019-05-27 PROCEDURE — 2580000003 HC RX 258: Performed by: INTERNAL MEDICINE

## 2019-05-27 RX ORDER — GUAIFENESIN 600 MG/1
600 TABLET, EXTENDED RELEASE ORAL 2 TIMES DAILY
Status: DISCONTINUED | OUTPATIENT
Start: 2019-05-27 | End: 2019-05-28 | Stop reason: HOSPADM

## 2019-05-27 RX ORDER — BENZONATATE 100 MG/1
100 CAPSULE ORAL 3 TIMES DAILY PRN
Status: DISCONTINUED | OUTPATIENT
Start: 2019-05-27 | End: 2019-05-28 | Stop reason: HOSPADM

## 2019-05-27 RX ORDER — SODIUM CHLORIDE 9 MG/ML
INJECTION, SOLUTION INTRAVENOUS
Status: DISPENSED
Start: 2019-05-27 | End: 2019-05-28

## 2019-05-27 RX ORDER — NIFEDIPINE 30 MG/1
60 TABLET, FILM COATED, EXTENDED RELEASE ORAL DAILY
Status: DISCONTINUED | OUTPATIENT
Start: 2019-05-28 | End: 2019-05-28 | Stop reason: HOSPADM

## 2019-05-27 RX ADMIN — FLUTICASONE PROPIONATE 2 SPRAY: 50 SPRAY, METERED NASAL at 10:17

## 2019-05-27 RX ADMIN — PROPAFENONE HYDROCHLORIDE 150 MG: 150 TABLET, FILM COATED ORAL at 14:58

## 2019-05-27 RX ADMIN — CEFTRIAXONE SODIUM 1 G: 1 INJECTION, POWDER, FOR SOLUTION INTRAMUSCULAR; INTRAVENOUS at 13:00

## 2019-05-27 RX ADMIN — GABAPENTIN 100 MG: 100 CAPSULE ORAL at 18:04

## 2019-05-27 RX ADMIN — PROPAFENONE HYDROCHLORIDE 150 MG: 150 TABLET, FILM COATED ORAL at 06:18

## 2019-05-27 RX ADMIN — GUAIFENESIN 600 MG: 600 TABLET, EXTENDED RELEASE ORAL at 22:08

## 2019-05-27 RX ADMIN — CARBIDOPA AND LEVODOPA 1 TABLET: 25; 100 TABLET ORAL at 09:17

## 2019-05-27 RX ADMIN — FUROSEMIDE 40 MG: 40 TABLET ORAL at 08:29

## 2019-05-27 RX ADMIN — DOXYCYCLINE HYCLATE 100 MG: 100 TABLET, COATED ORAL at 22:08

## 2019-05-27 RX ADMIN — APIXABAN 2.5 MG: 2.5 TABLET, FILM COATED ORAL at 09:16

## 2019-05-27 RX ADMIN — DULOXETINE HYDROCHLORIDE 60 MG: 60 CAPSULE, DELAYED RELEASE ORAL at 10:17

## 2019-05-27 RX ADMIN — PREDNISONE 40 MG: 20 TABLET ORAL at 10:16

## 2019-05-27 RX ADMIN — Medication 1 TABLET: at 10:16

## 2019-05-27 RX ADMIN — NIFEDIPINE 30 MG: 30 TABLET, FILM COATED, EXTENDED RELEASE ORAL at 10:16

## 2019-05-27 RX ADMIN — GUAIFENESIN 600 MG: 600 TABLET, EXTENDED RELEASE ORAL at 12:00

## 2019-05-27 RX ADMIN — SODIUM CHLORIDE, PRESERVATIVE FREE 10 ML: 5 INJECTION INTRAVENOUS at 22:09

## 2019-05-27 RX ADMIN — INSULIN LISPRO 1 UNITS: 100 INJECTION, SOLUTION INTRAVENOUS; SUBCUTANEOUS at 22:09

## 2019-05-27 RX ADMIN — APIXABAN 2.5 MG: 2.5 TABLET, FILM COATED ORAL at 22:08

## 2019-05-27 RX ADMIN — SODIUM CHLORIDE, PRESERVATIVE FREE 10 ML: 5 INJECTION INTRAVENOUS at 09:17

## 2019-05-27 RX ADMIN — Medication 2 PUFF: at 07:39

## 2019-05-27 RX ADMIN — FUROSEMIDE 40 MG: 40 TABLET ORAL at 18:04

## 2019-05-27 RX ADMIN — ASPIRIN 81 MG: 81 TABLET, COATED ORAL at 10:16

## 2019-05-27 RX ADMIN — Medication 2 PUFF: at 20:30

## 2019-05-27 RX ADMIN — METOPROLOL TARTRATE 50 MG: 50 TABLET ORAL at 09:16

## 2019-05-27 RX ADMIN — LAMOTRIGINE 50 MG: 25 TABLET ORAL at 22:08

## 2019-05-27 RX ADMIN — IPRATROPIUM BROMIDE AND ALBUTEROL SULFATE 3 ML: .5; 3 SOLUTION RESPIRATORY (INHALATION) at 15:37

## 2019-05-27 RX ADMIN — IPRATROPIUM BROMIDE AND ALBUTEROL SULFATE 3 ML: .5; 3 SOLUTION RESPIRATORY (INHALATION) at 07:36

## 2019-05-27 RX ADMIN — METOPROLOL TARTRATE 50 MG: 50 TABLET ORAL at 22:08

## 2019-05-27 RX ADMIN — CARBIDOPA AND LEVODOPA 1 TABLET: 25; 100 TABLET ORAL at 22:08

## 2019-05-27 RX ADMIN — IPRATROPIUM BROMIDE AND ALBUTEROL SULFATE 3 ML: .5; 3 SOLUTION RESPIRATORY (INHALATION) at 20:29

## 2019-05-27 RX ADMIN — SPIRONOLACTONE 25 MG: 25 TABLET ORAL at 10:16

## 2019-05-27 RX ADMIN — INSULIN LISPRO 4 UNITS: 100 INJECTION, SOLUTION INTRAVENOUS; SUBCUTANEOUS at 17:45

## 2019-05-27 RX ADMIN — OLODATEROL RESPIMAT INHALATION SPRAY 2 PUFF: 2.5 SPRAY, METERED RESPIRATORY (INHALATION) at 07:39

## 2019-05-27 RX ADMIN — DOXYCYCLINE HYCLATE 100 MG: 100 TABLET, COATED ORAL at 09:16

## 2019-05-27 RX ADMIN — PROPAFENONE HYDROCHLORIDE 150 MG: 150 TABLET, FILM COATED ORAL at 22:11

## 2019-05-27 RX ADMIN — IPRATROPIUM BROMIDE AND ALBUTEROL SULFATE 3 ML: .5; 3 SOLUTION RESPIRATORY (INHALATION) at 11:45

## 2019-05-27 RX ADMIN — ATORVASTATIN CALCIUM 20 MG: 10 TABLET, FILM COATED ORAL at 22:08

## 2019-05-27 RX ADMIN — MONTELUKAST 10 MG: 10 TABLET, FILM COATED ORAL at 22:08

## 2019-05-27 RX ADMIN — CARBIDOPA AND LEVODOPA 1 TABLET: 25; 100 TABLET ORAL at 14:59

## 2019-05-27 RX ADMIN — LAMOTRIGINE 50 MG: 25 TABLET ORAL at 09:29

## 2019-05-27 RX ADMIN — INSULIN LISPRO 2 UNITS: 100 INJECTION, SOLUTION INTRAVENOUS; SUBCUTANEOUS at 12:25

## 2019-05-27 NOTE — PROGRESS NOTES
05/26/19 2134   NIV Type   Skin Assessment Clean, dry, & intact   Equipment Type v60   Mode BIPAP   Mask Type Full face mask   Mask Size Medium   Settings/Measurements   Comfort Level Good   Using Accessory Muscles No   IPAP 12 cmH20   EPAP 6 cmH2O   Rate Ordered 8   Resp 26   SpO2 95   FiO2  25 %   Vt Exhaled 466 mL   Mask Leak (lpm) 0 lpm   Skin Protection for O2 Device Yes   Alarm Settings   Alarms On Y   Press Low Alarm 6 cmH2O   High Pressure Alarm 30 cmH2O   Delay Alarm 20 sec(s)   Resp Rate Low Alarm 6   High Respiratory Rate 45 br/min

## 2019-05-27 NOTE — PLAN OF CARE
Up in chair today. Up in room with stand by assist. Pt. did report feeling week when up with physical therapy and sat down and BP taken. /70 at time of reported weakness. Reported feeling immediately better when sitting down. Able to eat meals. Denies pain. Coughing off and on with occasional mucus production. Hospitalist notified and order received.

## 2019-05-27 NOTE — PROGRESS NOTES
05/26/19 2330   NIV Type   Equipment Type v60   Mode BIPAP   Mask Type Full face mask   Mask Size Medium   Settings/Measurements   Comfort Level Good   Using Accessory Muscles No   IPAP 12 cmH20   EPAP 6 cmH2O   Rate Ordered 8   Resp 18   SpO2 95   FiO2  25 %   Vt Exhaled 405 mL   Mask Leak (lpm) 0 lpm   Skin Protection for O2 Device Yes   Breath Sounds   Right Upper Lobe Diminished;Clear   Right Middle Lobe Diminished   Right Lower Lobe Diminished   Left Upper Lobe Diminished;Clear   Left Lower Lobe Diminished   Patient Observation   Observations mepilex on nose   Alarm Settings   Alarms On Y   Press Low Alarm 6 cmH2O   High Pressure Alarm 25 cmH2O   Apnea (secs) 20 secs   Resp Rate Low Alarm 6   High Respiratory Rate 45 br/min

## 2019-05-27 NOTE — PROGRESS NOTES
Occupational Therapy  Facility/Department: [facility] C3 TELE/MED SURG/ONC  Daily Treatment Note  NAME: Neetu Rae  : 1940  MRN: [id number]    Date of Service: 2019    Discharge Recommendations:  Subacute/Skilled Nursing Facility       Assessment   Performance deficits / Impairments: Decreased endurance;Decreased functional mobility ; Decreased ADL status; Decreased balance;Decreased strength;Decreased safe awareness;Decreased high-level IADLs  Patient Education: role of OT, safety, ADLs, functional mobility  REQUIRES OT FOLLOW UP: Yes  Activity Tolerance  Activity Tolerance: Patient limited by fatigue  Activity Tolerance: increased c/o fatigue & dizzy with walking short distance; RN reports pt had shower earlier & had been up to chair for 1.5 hours this am;   Safety Devices  Safety Devices in place: Yes  Type of devices: Bed alarm in place;Call light within reach; Left in bed;Nurse notified         Patient Diagnosis(es): The primary encounter diagnosis was Hypoxia. Diagnoses of Shortness of breath and Wheezing were also pertinent to this visit.       has a past medical history of Anemia, unspecified, Asthma, Baker's cyst, Blind left eye, Carotid artery stenoses, Carotid stenosis, Cervical spinal cord compression (HCC), 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-sided sciatica, CKD (chronic kidney disease) stage 3, GFR 30-59 ml/min ([address] Community acquired pneumonia of left lower lobe of lung ([address] CRI, Detached retina, left, Diverticulosis, DJD (degenerative joint disease), Edema, Fatty liver, GERD (gastroesophageal reflux disease), Herniated lumbar intervertebral disc, Hx stage 2 sacral decubitus ulcer, Hyperlipidemia, Hypertension, Hypothyroid, Morbid obesity with BMI of 40.0-44.9, adult ([address] BETSY treated with BiPAP, Restless leg syndrome, Restless legs syndrome, Sleep apnea, Tremor, essential, Type 2 diabetes mellitus without complication ([address] and Type II or unspecified type diabetes mellitus without mention of complication, not stated as uncontrolled. has a past surgical history that includes Tonsillectomy; Hysterectomy; Cholecystectomy; Tubal ligation; knee surgery; hernia repair; eye surgery; Cataract removal; vitrectomy; Corneal transplant; Carotid endarterectomy; Appendectomy; cervical fusion (11/2010); shoulder surgery; and joint replacement. Restrictions  Restrictions/Precautions  Restrictions/Precautions: General Precautions, Fall Risk, Up as Tolerated  Position Activity Restriction  Other position/activity restrictions: High fall risk per nursing assessment, telemetry,   Subjective   General  Chart Reviewed: Yes  Patient assessed for rehabilitation services?: Yes  Family / Caregiver Present: No  Referring Practitioner: Jerrod Tam MD  Diagnosis: acute respiratory failure with hypoxemia  General Comment  Comments: RN cleared pt for OT; pt resting in bed, agreeable to OT  Vital Signs  Patient Currently in Pain: Denies   Orientation  Orientation  Overall Orientation Status: Within Functional Limits  Objective    ADL  LE Dressing: Maximum assistance(to manage brief for toileting)  Toileting:  Moderate assistance        Balance  Sitting Balance: Supervision  Standing Balance: Contact guard assistance(with RW)  Standing Balance  Time: 1 minute x 2  Activity: became diaphroetic with walking to bathroom, RN called, /67  Functional Mobility  Functional - Mobility Device: Rolling Walker  Activity: (attepted to wal to bathroom, pt had to sit to recover & use BSC )  Assist Level: Contact guard assistance  Bed mobility  Supine to Sit: Contact guard assistance  Sit to Supine: Contact guard assistance  Transfers  Sit to stand: Contact guard assistance  Stand to sit: Contact guard assistance     Plan   Plan  Times per week: 3-5x/wk  Current Treatment Recommendations: Strengthening, ROM, Safety Education & Training, Balance Training, Self-Care / ADL, Functional Mobility Training, Endurance Training    AM-PAC

## 2019-05-27 NOTE — PROGRESS NOTES
Hospitalist Progress Note      PCP: David Das MD    Date of Admission: 5/22/2019    Chief Complaint: cough     Hospital Course: seen yesterday,cough addressed and diagnosed with pneumonia. Doing better today. Cough has resolved. Breathing better.  Overall more comfortable     Subjective: ongoing cough, some pleuritic chest pain, no shortness of breath, no nausea or vomiting , no hypoxia             Medications:  Reviewed    Infusion Medications    dextrose       Scheduled Medications    NIFEdipine  30 mg Oral Daily    predniSONE  40 mg Oral Daily    cefTRIAXone (ROCEPHIN) IV  1 g Intravenous Q24H    spironolactone  25 mg Oral Daily    furosemide  40 mg Oral BID    doxycycline hyclate  100 mg Oral 2 times per day    aspirin EC  81 mg Oral Daily    atorvastatin  20 mg Oral Nightly    fluticasone  2 puff Inhalation BID    carbidopa-levodopa  1 tablet Oral TID    DULoxetine  60 mg Oral Daily    apixaban  2.5 mg Oral BID    fluticasone  2 spray Each Nostril Daily    gabapentin  100 mg Oral QPM    ipratropium-albuterol  1 vial Inhalation Q4H WA    lamoTRIgine  50 mg Oral BID    metoprolol tartrate  50 mg Oral BID    montelukast  10 mg Oral Nightly    therapeutic multivitamin-minerals  1 tablet Oral Daily    propafenone  150 mg Oral 3 times per day    olodaterol  2 puff Inhalation Daily    sodium chloride flush  10 mL Intravenous 2 times per day    insulin lispro  0-12 Units Subcutaneous TID WC    insulin lispro  0-6 Units Subcutaneous Nightly     PRN Meds: hydrALAZINE, sodium chloride flush, magnesium hydroxide, ondansetron, glucose, dextrose, glucagon (rDNA), dextrose      Intake/Output Summary (Last 24 hours) at 5/27/2019 1019  Last data filed at 5/27/2019 0536  Gross per 24 hour   Intake 780 ml   Output 1350 ml   Net -570 ml       Physical Exam Performed:    BP (!) 178/73   Pulse 58   Temp 97.9 °F (36.6 °C) (Oral)   Resp 16   Ht 5' 6\" (1.676 m)   Wt 241 lb (109.3 kg)   SpO2 No evidence of acute cardiopulmonary disease. Assessment/Plan:    Active Hospital Problems    Diagnosis    Acute respiratory failure with hypoxemia (HCC) [J96.01]     Pneumonia- started on doxycycline(drug allergies noted)  Diagnosed 5/25  Feels better today, after addition of ceftriaxone  Continue same another day  -added mucinex/tessalon perles     COPD exacerbation  Continue steroids and HHNs  Lungs are clear today  Continue same management, except switching to po prednisone tomorrow     Acute kidney injury  Stable compared to yesterday  Nephrology following. Input appreciated     Dyslipidemia  Continue atorvastatin     afib- stable, on eliquis    Hypertension:  BP is elevated.  Patient is off ACEI due to FARHEEN  Nephrology following  Spironolactone added yesterday  Defer to nephrology      DVT Prophylaxis: on home eliquis  Diet: DIET CARB CONTROL;  Code Status: Full Code    PT/OT Eval Status: rec snf    Dispo - hopefully tomorrow    Lucia Malik MD

## 2019-05-28 VITALS
HEART RATE: 78 BPM | RESPIRATION RATE: 22 BRPM | DIASTOLIC BLOOD PRESSURE: 78 MMHG | HEIGHT: 66 IN | OXYGEN SATURATION: 92 % | SYSTOLIC BLOOD PRESSURE: 151 MMHG | BODY MASS INDEX: 38.73 KG/M2 | WEIGHT: 241 LBS | TEMPERATURE: 97.39 F

## 2019-05-28 LAB
GLUCOSE BLD-MCNC: 100 MG/DL (ref 70–99)
GLUCOSE BLD-MCNC: 131 MG/DL (ref 70–99)
PERFORMED ON: ABNORMAL
PERFORMED ON: ABNORMAL

## 2019-05-28 PROCEDURE — 94669 MECHANICAL CHEST WALL OSCILL: CPT

## 2019-05-28 PROCEDURE — 6370000000 HC RX 637 (ALT 250 FOR IP): Performed by: HOSPITALIST

## 2019-05-28 PROCEDURE — 6370000000 HC RX 637 (ALT 250 FOR IP): Performed by: INTERNAL MEDICINE

## 2019-05-28 PROCEDURE — 97116 GAIT TRAINING THERAPY: CPT

## 2019-05-28 PROCEDURE — 94640 AIRWAY INHALATION TREATMENT: CPT

## 2019-05-28 PROCEDURE — 2580000003 HC RX 258: Performed by: HOSPITALIST

## 2019-05-28 PROCEDURE — 94660 CPAP INITIATION&MGMT: CPT

## 2019-05-28 PROCEDURE — 97530 THERAPEUTIC ACTIVITIES: CPT

## 2019-05-28 RX ORDER — CEFDINIR 300 MG/1
300 CAPSULE ORAL EVERY 12 HOURS SCHEDULED
Status: DISCONTINUED | OUTPATIENT
Start: 2019-05-28 | End: 2019-05-28 | Stop reason: HOSPADM

## 2019-05-28 RX ORDER — PREDNISONE 20 MG/1
TABLET ORAL
Qty: 9 TABLET | Refills: 0
Start: 2019-05-28 | End: 2019-06-10 | Stop reason: ALTCHOICE

## 2019-05-28 RX ORDER — DOXYCYCLINE HYCLATE 100 MG
100 TABLET ORAL EVERY 12 HOURS SCHEDULED
Qty: 6 TABLET | Refills: 0
Start: 2019-05-28 | End: 2019-05-31

## 2019-05-28 RX ORDER — SPIRONOLACTONE 25 MG/1
25 TABLET ORAL DAILY
Qty: 30 TABLET | Refills: 1
Start: 2019-05-29 | End: 2019-06-10 | Stop reason: SDUPTHER

## 2019-05-28 RX ORDER — CEFDINIR 300 MG/1
300 CAPSULE ORAL EVERY 12 HOURS SCHEDULED
Qty: 6 CAPSULE | Refills: 0
Start: 2019-05-28 | End: 2019-05-31

## 2019-05-28 RX ORDER — FUROSEMIDE 40 MG/1
40 TABLET ORAL 2 TIMES DAILY
Qty: 60 TABLET | Refills: 3
Start: 2019-05-28 | End: 2019-06-10

## 2019-05-28 RX ORDER — GUAIFENESIN 600 MG/1
600 TABLET, EXTENDED RELEASE ORAL 2 TIMES DAILY
Qty: 6 TABLET | Refills: 0
Start: 2019-05-28 | End: 2020-02-29

## 2019-05-28 RX ORDER — NIFEDIPINE 60 MG/1
60 TABLET, FILM COATED, EXTENDED RELEASE ORAL DAILY
Qty: 30 TABLET | Refills: 1
Start: 2019-05-29 | End: 2019-06-10 | Stop reason: SDUPTHER

## 2019-05-28 RX ORDER — BENZONATATE 100 MG/1
100 CAPSULE ORAL 3 TIMES DAILY PRN
Qty: 6 CAPSULE | Refills: 0
Start: 2019-05-28 | End: 2019-06-04

## 2019-05-28 RX ADMIN — LAMOTRIGINE 50 MG: 25 TABLET ORAL at 09:09

## 2019-05-28 RX ADMIN — ASPIRIN 81 MG: 81 TABLET, COATED ORAL at 09:09

## 2019-05-28 RX ADMIN — IPRATROPIUM BROMIDE AND ALBUTEROL SULFATE 3 ML: .5; 3 SOLUTION RESPIRATORY (INHALATION) at 07:26

## 2019-05-28 RX ADMIN — CARBIDOPA AND LEVODOPA 1 TABLET: 25; 100 TABLET ORAL at 09:09

## 2019-05-28 RX ADMIN — GUAIFENESIN 600 MG: 600 TABLET, EXTENDED RELEASE ORAL at 09:10

## 2019-05-28 RX ADMIN — METOPROLOL TARTRATE 50 MG: 50 TABLET ORAL at 09:10

## 2019-05-28 RX ADMIN — NIFEDIPINE 60 MG: 30 TABLET, FILM COATED, EXTENDED RELEASE ORAL at 09:09

## 2019-05-28 RX ADMIN — PROPAFENONE HYDROCHLORIDE 150 MG: 150 TABLET, FILM COATED ORAL at 06:20

## 2019-05-28 RX ADMIN — CARBIDOPA AND LEVODOPA 1 TABLET: 25; 100 TABLET ORAL at 15:27

## 2019-05-28 RX ADMIN — Medication 2 PUFF: at 07:28

## 2019-05-28 RX ADMIN — IPRATROPIUM BROMIDE AND ALBUTEROL SULFATE 3 ML: .5; 3 SOLUTION RESPIRATORY (INHALATION) at 11:42

## 2019-05-28 RX ADMIN — FUROSEMIDE 40 MG: 40 TABLET ORAL at 09:09

## 2019-05-28 RX ADMIN — Medication 1 TABLET: at 09:10

## 2019-05-28 RX ADMIN — OLODATEROL RESPIMAT INHALATION SPRAY 2 PUFF: 2.5 SPRAY, METERED RESPIRATORY (INHALATION) at 07:28

## 2019-05-28 RX ADMIN — DOXYCYCLINE HYCLATE 100 MG: 100 TABLET, COATED ORAL at 09:10

## 2019-05-28 RX ADMIN — PREDNISONE 40 MG: 20 TABLET ORAL at 09:10

## 2019-05-28 RX ADMIN — DULOXETINE HYDROCHLORIDE 60 MG: 60 CAPSULE, DELAYED RELEASE ORAL at 09:10

## 2019-05-28 RX ADMIN — PROPAFENONE HYDROCHLORIDE 150 MG: 150 TABLET, FILM COATED ORAL at 15:27

## 2019-05-28 RX ADMIN — SODIUM CHLORIDE, PRESERVATIVE FREE 10 ML: 5 INJECTION INTRAVENOUS at 09:10

## 2019-05-28 RX ADMIN — SPIRONOLACTONE 25 MG: 25 TABLET ORAL at 09:09

## 2019-05-28 RX ADMIN — APIXABAN 2.5 MG: 2.5 TABLET, FILM COATED ORAL at 09:09

## 2019-05-28 RX ADMIN — IPRATROPIUM BROMIDE AND ALBUTEROL SULFATE 3 ML: .5; 3 SOLUTION RESPIRATORY (INHALATION) at 16:40

## 2019-05-28 RX ADMIN — FLUTICASONE PROPIONATE 2 SPRAY: 50 SPRAY, METERED NASAL at 09:14

## 2019-05-28 NOTE — PROGRESS NOTES
05/28/19 0404   NIV Type   Equipment Type v60   Mode BIPAP   Mask Type Full face mask   Mask Size Medium   Settings/Measurements   Comfort Level Good   Using Accessory Muscles No   IPAP 12 cmH20   EPAP 6 cmH2O   Rate Ordered 8   Resp 18   SpO2 96   FiO2  25 %   Vt Exhaled 613 mL   Mask Leak (lpm) 0 lpm   Skin Protection for O2 Device Yes   Patient Observation   Observations mepilex on nose   Alarm Settings   Alarms On Y   Press Low Alarm 6 cmH2O   High Pressure Alarm 25 cmH2O   Apnea (secs) 20 secs   Resp Rate Low Alarm 6   High Respiratory Rate 45 br/min

## 2019-05-28 NOTE — PROGRESS NOTES
Speech Language Pathology  Attempt Tx Note    SLP reviewed pt's chart in attempt for dysphagia tx f/u.  RN in room providing pt care at time of attempt. SLP will continue to follow and will re-attempt later this date as schedule allows. Thank you. Patricia LAZCANO CCC-SLP  Speech-Language Pathologist  [phone]

## 2019-05-28 NOTE — CARE COORDINATION
nHpredict outcome report received and reviewed with patient. Provided patient with copy of report. Addressed all questions/concerns.      Mely Hendricks, RN  Care Transitions Coordinator

## 2019-05-28 NOTE — CARE COORDINATION
[facility] made writer aware the pt has SNF rec's and a referral has been made to SNF. Should pt DC home with SNF rec's she will be enrolled in [facility] S3 Program. If pt DC's to SNF I will update [facility]'s CRE so agency can arrange services upon DC from SNF. HOME HEALTH CARE: LEVEL 3 SAFETY     Home health agency to establish plan of care for patient over 60 day period   Nursing  Initial home SN evaluation visit to occur within 24-48 hours for:  medication management  VS and clinical assessment  S&S chronic disease exacerbation education + when to contact MD / NP  care coordination  Medication Reconciliation during 1st SN visit    PT/OT  Evaluations in home within 24-48 hours of discharge to include DME and home safety   Frontload therapy 5 days, then 3x a week   OT to evaluate if patient has [address] needs for personal care      evaluation within 24-48 hours to evaluate resources & insurance for potential [place], [place], LTC, and Medicaid options     PCP Visit scheduled within 3 - 7 days of hospital discharge        Update 4:11- Writer aware pt is DC'ing to SNF- [facility]. Writer made CRE with [facility] aware of pt's dispo so CRE can f/u with pt during stay at SNF, should [address] be needed.      Kumar Like LAKIA CTANDREY with [address]  ACV:[phone]

## 2019-05-28 NOTE — CARE COORDINATION
CASE MANAGEMENT DISCHARGE SUMMARY      Discharge to: [facility]    Precertification completed: [facility] Exemption Notification (HENS) completed: yes    New Durable Medical Equipment ordered/agency: none    Transportation: private   Family/car:car    Notified: patient aware   Family:  at bedside   Facility/Agency: [facility] faxed [phone] when available   RN: Martha   Phone number for report to facility: [phone]    Note: Discharging nurse to complete [facility], reconcile [facility], and place final copy with patient's discharge packet. RN to ensure that written prescriptions for  Level II medications are sent with patient to the facility as per protocol.       Janay Aguayo RN

## 2019-05-28 NOTE — PROGRESS NOTES
05/28/19 0010   NIV Type   $NIV $Daily Charge   Equipment Type v60   Mode BIPAP   Mask Type Full face mask   Mask Size Medium   Settings/Measurements   Comfort Level Good   Using Accessory Muscles No   IPAP 12 cmH20   EPAP 6 cmH2O   Rate Ordered 8   Resp 19   SpO2 97   FiO2  25 %   Vt Exhaled 546 mL   Mask Leak (lpm) 0 lpm   Skin Protection for O2 Device Yes   Breath Sounds   Right Upper Lobe Clear;Diminished   Right Middle Lobe Diminished;Clear   Right Lower Lobe Diminished   Left Upper Lobe Clear;Diminished   Left Lower Lobe Diminished   Patient Observation   Observations mepilex on nose   Alarm Settings   Alarms On Y   Press Low Alarm 6 cmH2O   High Pressure Alarm 25 cmH2O   Apnea (secs) 20 secs   Resp Rate Low Alarm 6   High Respiratory Rate 45 br/min

## 2019-05-28 NOTE — PROGRESS NOTES
05/27/19 2156   NIV Type   Equipment Type v60   Mode BIPAP   Mask Type Full face mask   Mask Size Medium   Settings/Measurements   Comfort Level Good   Using Accessory Muscles No   IPAP 12 cmH20   EPAP 6 cmH2O   Rate Ordered 8   Resp 24   SpO2 96   FiO2  25 %   Vt Exhaled 550 mL   Mask Leak (lpm) 0 lpm   Skin Protection for O2 Device Yes   Patient Observation   Observations mepilex on nose   Alarm Settings   Alarms On Y   Press Low Alarm 6 cmH2O   High Pressure Alarm 25 cmH2O   Apnea (secs) 20 secs   Resp Rate Low Alarm 6   High Respiratory Rate 45 br/min

## 2019-05-28 NOTE — PROGRESS NOTES
[facility]   [phone]  [facility]  [facility] Progress note                                         Interval Plan:     · Renal function stable    Assessment:     FARHEEN w/ CKD 3  · Baseline 1.2-1.5  · U/A possible UTI  · P/C 400 mg   · Cr peaked to 1.8- down to 1.5 now  Parkinson's disease  · Sinemet  Atrial fibrillation  · Cont home Eliquis, Propafenone, Metoprolol  · HFpEF hx of mild to modere MR cont Lasix  ·  hold home ARB  Hypertension   BP: (144-145)/(49-80)  Pulse:  [53-65]   BP is OK  Ok to got back to home meds on DC    Resp Failure  · On ATBX with IV solumedrol and breathing treatments. Please call our office at [phone] with any questions or contact me directly. Subjective:     CC / Reason for Consult: FARHEEN w/ CKD    HPI/PMH:  70 y. o. female who presented to [facility] with worsening SOB.  Sx started couple weeks ago, saw PCP, CXR with suspected CHF, advised to double her Lasix x 3 days then reeval.  Notes some improvement in SOB with Lasix, incl 10 lb wt decline and less leg swelling.  Despite this persistent SOB, ANN.  Ongoing cough, difficulty clearing phlegm.  Presented to ED, hypoxic on RA, baseline uses BIPAP with 02 qhs alone.  Plan to admit for further workup/mgmt      ROS / Interval Hx: Patient seen in room without nursing and family present. No new issues. Eating well but feeling weak. Urinating fine. Had BM. Objective:        Gen: alert, well appearing, and in no distress and oriented to person, place, and time     Neck:  supple, no significant adenopathy     Cardio: normal rate, regular rhythm, normal S1, S2, no murmurs, rubs, clicks or gallops      Resp: clear to auscultation, no wheezes, rales or rhonchi, coarse cough. GI:  soft, nontender, nondistended, no masses or organomegaly.       Ext:  peripheral pulses normal, no pedal edema, no clubbing or cyanosis      MS: no joint tenderness, deformity or swelling      DERM: normal coloration and turgor, no rashesor bruising    Vitals:     BP (!) 145/80   Pulse 65   Temp 97.8 °F (36.6 °C) (Oral)   Resp 16   Ht 5' 6\" (1.676 m)   Wt 241 lb (109.3 kg)   SpO2 95%   BMI 38.90 kg/m²      I/Os: Reviewed    Meds: Reviewed. Please see plan for changes made.     Labs:    Lab Results   Component Value Date    WBC 15.5 05/27/2019    HGB 11.2 05/27/2019    HCT 35.0 05/27/2019    MCV 85.7 05/27/2019     05/27/2019      Lab Results   Component Value Date    CREATININE 1.5 05/27/2019    BUN 54 05/27/2019     05/27/2019    K 4.8 05/27/2019    K 5.4 05/23/2019    CL 94 05/27/2019    CO2 31 05/27/2019     No components found for: GLU   Lab Results   Component Value Date    CALCIUM 9.3 05/27/2019    PHOS 3.5 05/13/2019      No results found for: [id number]   Lab Results   Component Value Date    IRON 45 05/11/2018    TIBC 255 05/11/2018    FERRITIN 43.9 05/11/2018      Lab Results   Component Value Date    MICROALBUR 20 01/27/2015

## 2019-05-28 NOTE — CARE COORDINATION
Chart reviewed day 6. Martha DORMAN states likely to d/c today. Spoke with patient and  SNF rec's noted. Would like referral to [facility]. Done. States will be able to bring in her BIPAP from home. And  would like to transport. Is medicare and has 6 nights. Waiting eval from [facility]. Khari Early RN    Spoke with Yan Warner at [facility]. Ok to accept patient today for skilled stay.  has stated he will bring in BIPAP. RN updated.  Khari Early RN

## 2019-05-28 NOTE — PROGRESS NOTES
Shift assessment completed and charted. Per pt still having dry cough that's unproductive at this time. Bed alarm on. Pt denies nay pain or discomfort at this time. Bed locked and in lowest position. Call light within reach. Pt denies any other needs at this time. Will continue to monitor.

## 2019-05-28 NOTE — PROGRESS NOTES
telemetry  Subjective   General  Chart Reviewed: Yes  Additional Pertinent Hx: Parkinson's disease  Response To Previous Treatment: Patient with no complaints from previous session. Family / Caregiver Present: Yes()  Referring Practitioner: Judy Peralta. Marcia Sears MD  Subjective  Subjective: Pt agreeable to work with PT this morning. Says she'd like to try and walk, although still c/o ongoing weakness and episodes of lightheadedness while amb. General Comment  Comments: Pt resting in bed upon entry of therapy staff  Pain Screening  Patient Currently in Pain: Denies       Orientation  Orientation  Overall Orientation Status: Within Functional Limits    Objective   Bed mobility  Supine to Sit: Contact guard assistance(moving toward R side, HOB elevated ~45 degrees)  Scooting: Stand by assistance(to scoot forward to EOB)     Transfers  Sit to Stand: Minimal Assistance;Contact guard assistance(min-CGA x 1 depending on height of sitting surface and level of fatigue)  Stand to sit: Contact guard assistance(min cues needed for safety)  Bed to Chair: Minimal assistance;Contact guard assistance(using RW, mildly unsteady, min cues needed for safety)     Ambulation  Surface: level tile  Device: Rolling Walker  Assistance: Contact guard assistance;Minimal assistance  Gait Deviations: Slow Nemo;Decreased step length;Decreased step height;Shuffles;Decreased head and trunk rotation  Distance: x 10 feet, x 5 feet  Comments: Amb distance limited by sudden onset of dizziness, fatigue, and shakiness. Balance  Posture: Fair  Sitting - Static: Good  Sitting - Dynamic: Fair  Standing - Static: Fair  Standing - Dynamic: Fair;-     Exercises  Hip Flexion: standing marching x 10 BLE at RW (with min/CGA for balance)  Ankle Pumps: x 15 BLE      Assessment   Body structures, Functions, Activity limitations: Decreased functional mobility ; Decreased endurance;Decreased strength;Decreased balance;Decreased safe awareness  Assessment: Pt with fair tolerance to therapy today, continues to demonstrate intermittent episodes of quick-onset unsteadiness, dizziness, and shakiness with activity. Today pt was only able to amb ~10 feet at a time before becoming too lightheaded to continue (BP WFL after activity). Continue to recommend SNF at D/C given above deficits. Treatment Diagnosis: Decreased strength and (I) with mobility  Specific instructions for Next Treatment: Progress ther ex and mobility as tolerated  Prognosis: Good  Decision Making: Medium Complexity  Patient Education: Role of PT, benefits of mobility, safety in transfers/amb with RW, monitoring of vitals, D/C recs - pt & pt's  verbalize understanding  REQUIRES PT FOLLOW UP: Yes  Activity Tolerance: Patient Tolerated treatment well;Patient limited by fatigue  Activity Tolerance: Pt with quick onset of dizziness/lightheadedness/shaking while amb in room (pulled chair quickly behind pt for safety to pt could sit). BP = 147/85 after amb. AM-PAC Score  AM-PAC Inpatient Mobility without Stair Climbing Raw Score : 14  AM-PAC Inpatient without Stair Climbing T-Scale Score : 40.85  Mobility Inpatient CMS 0-100% Score: 53.33  Mobility Inpatient without Stair CMS G-Code Modifier : CK    Goals  Short term goals  Time Frame for Short term goals: 1 week, 5/30/19 (unless otherwise specified)  Short term goal 1: Pt will transfer supine <-> sit with supervision  Short term goal 2: Pt will transfer sit <-> stand and bed>chair using RW with CGA x 1  Short term goal 3: Pt will ambulate x 60 feet using RW with CGA x 1  Short term goal 4: By 5/25/19: Pt will tolerate 12-15 reps BLE exercise for strengthening, balance, and endurance - not yet met, goal ongoing to continue to build strength, balance, and endurance  Patient Goals   Patient goals :  \"To get stronger and more steady\"    Plan    Times per week: 3-5x/week in acute care  Times per day: Daily  Specific instructions for Next Treatment:

## 2019-06-10 ENCOUNTER — OFFICE VISIT (OUTPATIENT)
Dept: FAMILY MEDICINE CLINIC | Age: 79
End: 2019-06-10
Payer: MEDICARE

## 2019-06-10 VITALS
HEART RATE: 69 BPM | OXYGEN SATURATION: 94 % | WEIGHT: 249.8 LBS | RESPIRATION RATE: 16 BRPM | DIASTOLIC BLOOD PRESSURE: 60 MMHG | BODY MASS INDEX: 40.15 KG/M2 | SYSTOLIC BLOOD PRESSURE: 118 MMHG | TEMPERATURE: 99.7 F | HEIGHT: 66 IN

## 2019-06-10 DIAGNOSIS — N18.30 CKD (CHRONIC KIDNEY DISEASE) STAGE 3, GFR 30-59 ML/MIN (HCC): ICD-10-CM

## 2019-06-10 DIAGNOSIS — J18.9 PNEUMONIA OF LEFT UPPER LOBE DUE TO INFECTIOUS ORGANISM: ICD-10-CM

## 2019-06-10 DIAGNOSIS — I50.32 CHRONIC DIASTOLIC CONGESTIVE HEART FAILURE (HCC): ICD-10-CM

## 2019-06-10 DIAGNOSIS — J96.21 ACUTE ON CHRONIC RESPIRATORY FAILURE WITH HYPOXIA (HCC): Primary | ICD-10-CM

## 2019-06-10 PROCEDURE — 99214 OFFICE O/P EST MOD 30 MIN: CPT | Performed by: PHYSICIAN ASSISTANT

## 2019-06-10 PROCEDURE — G8427 DOCREV CUR MEDS BY ELIG CLIN: HCPCS | Performed by: PHYSICIAN ASSISTANT

## 2019-06-10 PROCEDURE — 1090F PRES/ABSN URINE INCON ASSESS: CPT | Performed by: PHYSICIAN ASSISTANT

## 2019-06-10 PROCEDURE — G8417 CALC BMI ABV UP PARAM F/U: HCPCS | Performed by: PHYSICIAN ASSISTANT

## 2019-06-10 PROCEDURE — 1123F ACP DISCUSS/DSCN MKR DOCD: CPT | Performed by: PHYSICIAN ASSISTANT

## 2019-06-10 PROCEDURE — G8400 PT W/DXA NO RESULTS DOC: HCPCS | Performed by: PHYSICIAN ASSISTANT

## 2019-06-10 PROCEDURE — 1036F TOBACCO NON-USER: CPT | Performed by: PHYSICIAN ASSISTANT

## 2019-06-10 PROCEDURE — G8598 ASA/ANTIPLAT THER USED: HCPCS | Performed by: PHYSICIAN ASSISTANT

## 2019-06-10 PROCEDURE — 4040F PNEUMOC VAC/ADMIN/RCVD: CPT | Performed by: PHYSICIAN ASSISTANT

## 2019-06-10 PROCEDURE — 1111F DSCHRG MED/CURRENT MED MERGE: CPT | Performed by: PHYSICIAN ASSISTANT

## 2019-06-10 RX ORDER — FUROSEMIDE 40 MG/1
40 TABLET ORAL DAILY
Qty: 60 TABLET | Refills: 3
Start: 2019-06-10 | End: 2019-06-26

## 2019-06-10 RX ORDER — NIFEDIPINE 60 MG/1
60 TABLET, FILM COATED, EXTENDED RELEASE ORAL DAILY
Qty: 90 TABLET | Refills: 1 | Status: SHIPPED | OUTPATIENT
Start: 2019-06-10 | End: 2019-11-14 | Stop reason: SDUPTHER

## 2019-06-10 RX ORDER — SPIRONOLACTONE 25 MG/1
25 TABLET ORAL DAILY
Qty: 90 TABLET | Refills: 1 | Status: SHIPPED | OUTPATIENT
Start: 2019-06-10 | End: 2019-11-14 | Stop reason: SDUPTHER

## 2019-06-10 ASSESSMENT — ENCOUNTER SYMPTOMS
NAUSEA: 0
VOMITING: 0
RHINORRHEA: 0
DIARRHEA: 0
CONSTIPATION: 0
SORE THROAT: 0
ABDOMINAL PAIN: 0
COUGH: 0

## 2019-06-10 NOTE — PROGRESS NOTES
Post-Discharge Transitional Care Management Services or Hospital Follow Up      Jose Escalante   YOB: 1940    Date of Office Visit:  6/10/2019  Date of Hospital Admission: 5/22/19  Date of Hospital Discharge: 5/28/19  Readmission Risk Score(high >=14%.  Medium >=10%):Readmission Risk Score: 35      Care management risk score Rising risk (score 2-5) and Complex Care (Scores >=6): 13     Non face to face  following discharge, date last encounter closed (first attempt may have been earlier): *No documented post hospital discharge outreach found in the last 14 days *No documented post hospital discharge outreach found in the last 14 days    Call initiated 2 business days of discharge: *No response recorded in the last 14 days     Patient Active Problem List   Diagnosis    GERD (gastroesophageal reflux disease)    Essential hypertension    Hyperlipidemia    Restless leg syndrome    Chronic normocytic anemia    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    Moderate persistent asthma    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-sided sciatica    Morbid obesity with BMI of 40.0-44.9, adult ([facility]    Type 2 diabetes mellitus with stage 3 chronic kidney disease, without long-term current use of insulin ([facility]    CAD (coronary artery disease)    Parkinson's disease ([facility]    BETSY treated with BiPAP    Hypothyroidism    Hepatic steatosis    Diverticulosis    Blind left eye    L carotid artery stenosis s/p CEA    PAF (paroxysmal atrial fibrillation) (HCC)    COPD (chronic obstructive pulmonary disease) (HCC)    Acute on chronic respiratory failure with hypoxia (HCC)    Chronic diastolic congestive heart failure (HCC)    Basal cell carcinoma of left cheek    Acute respiratory failure with hypoxemia (HCC)    Pneumonia due to infectious organism       Allergies   Allergen Reactions    Levaquin [Levofloxacin]      Pt was given in er and developed hives and redness    Pyridium [Phenazopyridine Hcl]     Reglan [Metoclopramide Hcl]     Reglan [Metoclopramide Hcl]      tremors       Medications listed as ordered at the time of discharge from hospital   Marine Kirill \"Ritu\"   Home Medication Instructions JAMILA:    Printed on:06/16/19 1660   Medication Information                      albuterol sulfate HFA (VENTOLIN HFA) 108 (90 Base) MCG/ACT inhaler  Inhale 2 puffs into the lungs every 6 hours as needed for Wheezing             Alpha-Lipoic Acid 300 MG CAPS  Take 300 mg by mouth daily             aspirin EC 81 MG EC tablet  Take 1 tablet by mouth daily. Start taking next week. Indications: OTC             atorvastatin (LIPITOR) 40 MG tablet  Take 1 and 1/2 tablets by mouth every evening             budesonide (PULMICORT FLEXHALER) 180 MCG/ACT AEPB inhaler  Inhale 2 puffs into the lungs 2 times daily             carbidopa-levodopa (SINEMET)  MG per tablet  TAKE 1 TABLET BY MOUTH 3 TIMES A DAY             DULoxetine (CYMBALTA) 60 MG extended release capsule  Take 1 capsule by mouth daily             ELIQUIS 2.5 MG TABS tablet  Take 1 tablet by mouth 2 times daily             fluticasone (FLONASE) 50 MCG/ACT nasal spray  USE 2 PUFFS IN EACH NOSTRIL DAILY             furosemide (LASIX) 40 MG tablet  Take 1 tablet by mouth daily             gabapentin (NEURONTIN) 100 MG capsule  Take 1 capsule by mouth every evening for 90 days. Amandeep Sullivan guaiFENesin (MUCINEX) 600 MG extended release tablet  Take 1 tablet by mouth 2 times daily             Handicap Placard MISC  by Does not apply route. 12/12/14-12/12/19             ipratropium-albuterol (DUONEB) 0.5-2.5 (3) MG/3ML SOLN nebulizer solution  Inhale 3 mLs into the lungs every 6 hours as needed for Shortness of Breath DX: J45.40             lamoTRIgine (LAMICTAL) 25 MG tablet  Take 50 mg by mouth 2 times daily              lidocaine (XYLOCAINE) 5 % ointment  Apply 1 Applicatorful topically as needed for Pain Apply topically as needed.              metoprolol tartrate (LOPRESSOR) 50 MG tablet  Take 1 tablet by mouth 2 times daily             montelukast (SINGULAIR) 10 MG tablet  Take 1 tablet by mouth daily. Multiple Vitamins-Minerals (THERAPEUTIC MULTIVITAMIN-MINERALS) tablet  Take 1 tablet by mouth daily             NIFEdipine (ADALAT CC) 60 MG extended release tablet  Take 1 tablet by mouth daily             propafenone (RYTHMOL) 150 MG tablet  Take 1 tablet by mouth every 8 hours             salmeterol (SEREVENT) 50 MCG/DOSE diskus inhaler  Inhale 1 puff by mouth 2 times a day. spironolactone (ALDACTONE) 25 MG tablet  Take 1 tablet by mouth daily                   Medications marked \"taking\" at this time  Outpatient Medications Marked as Taking for the 6/10/19 encounter (Office Visit) with BIJAN Barfield   Medication Sig Dispense Refill    NIFEdipine (ADALAT CC) 60 MG extended release tablet Take 1 tablet by mouth daily 90 tablet 1    furosemide (LASIX) 40 MG tablet Take 1 tablet by mouth daily 60 tablet 3    spironolactone (ALDACTONE) 25 MG tablet Take 1 tablet by mouth daily 90 tablet 1    guaiFENesin (MUCINEX) 600 MG extended release tablet Take 1 tablet by mouth 2 times daily 6 tablet 0    lidocaine (XYLOCAINE) 5 % ointment Apply 1 Applicatorful topically as needed for Pain Apply topically as needed.  ELIQUIS 2.5 MG TABS tablet Take 1 tablet by mouth 2 times daily 180 tablet 0    propafenone (RYTHMOL) 150 MG tablet Take 1 tablet by mouth every 8 hours 90 tablet 3    metoprolol tartrate (LOPRESSOR) 50 MG tablet Take 1 tablet by mouth 2 times daily 180 tablet 0    DULoxetine (CYMBALTA) 60 MG extended release capsule Take 1 capsule by mouth daily 90 capsule 0    gabapentin (NEURONTIN) 100 MG capsule Take 1 capsule by mouth every evening for 90 days. . 90 capsule 0    ipratropium-albuterol (DUONEB) 0.5-2.5 (3) MG/3ML SOLN nebulizer solution Inhale 3 mLs into the lungs every 6 hours as needed for Shortness of Breath DX: J45.40 360 mL 1  budesonide (PULMICORT FLEXHALER) 180 MCG/ACT AEPB inhaler Inhale 2 puffs into the lungs 2 times daily 3 each 1    carbidopa-levodopa (SINEMET)  MG per tablet TAKE 1 TABLET BY MOUTH 3 TIMES A DAY 90 tablet 1    atorvastatin (LIPITOR) 40 MG tablet Take 1 and 1/2 tablets by mouth every evening 135 tablet 1    montelukast (SINGULAIR) 10 MG tablet Take 1 tablet by mouth daily. 90 tablet 1    albuterol sulfate HFA (VENTOLIN HFA) 108 (90 Base) MCG/ACT inhaler Inhale 2 puffs into the lungs every 6 hours as needed for Wheezing 3 Inhaler 1    fluticasone (FLONASE) 50 MCG/ACT nasal spray USE 2 PUFFS IN EACH NOSTRIL DAILY (Patient taking differently: USE 2 PUFFS IN EACH NOSTRIL DAILY prn) 1 Bottle 12    salmeterol (SEREVENT) 50 MCG/DOSE diskus inhaler Inhale 1 puff by mouth 2 times a day. 180 each 1    lamoTRIgine (LAMICTAL) 25 MG tablet Take 50 mg by mouth 2 times daily       Multiple Vitamins-Minerals (THERAPEUTIC MULTIVITAMIN-MINERALS) tablet Take 1 tablet by mouth daily      Alpha-Lipoic Acid 300 MG CAPS Take 300 mg by mouth daily      Handicap Placard MISC by Does not apply route. 12/12/14-12/12/19 1 each 0    aspirin EC 81 MG EC tablet Take 1 tablet by mouth daily. Start taking next week. Indications: OTC 30 tablet 2        Medications patient taking as of now reconciled against medications ordered at time of hospital discharge: Yes    Chief Complaint   Patient presents with   [address] from Hospital       HPI    Inpatient course: Discharge summary reviewed- see chart. Patient admitted 5/22-5/28 2/2 to acute hypoxic respiratory failure 2/2 to pneumonia. Patient improved with abx, started with doxycycline then ceftriaxone added. Switched to po at discharge to rehab. Completed abx while in rehab. Nephrology consulted while inpatient, baseline Cr 1.2-1.5. In rehab on lasix 40 bid and spironolactone 25 mg daily. Losartan d/c and pt started on nifedipine.  Wori    Interval history/Current status: Has been doing well at home. Tires easily. Weights stable, swelling well controlled on current diuretics. O2 continues to drop with activity but improves at rest. Needs spironolactone refill. No cough, fever, chills, nausea or vomiting. Review of Systems   Constitutional: Positive for fatigue. Negative for activity change, chills and fever. HENT: Negative for congestion, ear pain, rhinorrhea and sore throat. Eyes: Negative for visual disturbance. Respiratory: Positive for shortness of breath. Negative for cough. Cardiovascular: Negative for chest pain and palpitations. Gastrointestinal: Negative for abdominal pain, constipation, diarrhea, nausea and vomiting. Genitourinary: Negative for difficulty urinating and dysuria. Musculoskeletal: Negative for arthralgias and myalgias. Skin: Negative for rash. Neurological: Negative for dizziness, weakness and numbness. Psychiatric/Behavioral: Negative for sleep disturbance. Vitals:    06/10/19 1312   BP: 118/60   Site: Right Upper Arm   Position: Sitting   Cuff Size: Large Adult   Pulse: 69   Resp: 16   Temp: 99.7 °F (37.6 °C)   SpO2: 94%   Weight: 249 lb 12.8 oz (113.3 kg)   Height: 5' 6\" (1.676 m)     Body mass index is 40.32 kg/m². Wt Readings from Last 3 Encounters:   06/10/19 249 lb 12.8 oz (113.3 kg)   05/22/19 241 lb (109.3 kg)   05/22/19 256 lb (116.1 kg)     BP Readings from Last 3 Encounters:   06/10/19 118/60   05/28/19 (!) 151/78   05/22/19 126/70       Physical Exam   Constitutional: She is oriented to person, place, and time. She appears well-developed and well-nourished. No distress. HENT:   Head: Normocephalic and atraumatic. Eyes: Pupils are equal, round, and reactive to light. Conjunctivae and EOM are normal.   Neck: Neck supple. Cardiovascular: Normal rate, regular rhythm and normal heart sounds. No murmur heard. Pulmonary/Chest: Effort normal and breath sounds normal. She has no wheezes. Abdominal: Soft.  Bowel sounds are normal. There is no tenderness. Musculoskeletal: She exhibits edema (baseline BLE, 1+.). Lymphadenopathy:     She has no cervical adenopathy. Neurological: She is alert and oriented to person, place, and time. She has normal reflexes. Skin: Skin is warm and dry. No rash noted. Psychiatric: She has a normal mood and affect. Assessment/Plan:  1. Acute on chronic respiratory failure with hypoxia ([facility])  2. Pneumonia of left upper lobe due to infectious organism ([facility] [address]  - [place] DISCHARGE MEDS RECONCILED W/ CURRENT OUTPATIENT MED LIST  - 2/2 to pneumonia, resolved     3. Chronic diastolic congestive heart failure (HCC)  - FL DISCHARGE MEDS RECONCILED W/ CURRENT OUTPATIENT MED LIST  - NIFEdipine (ADALAT CC) 60 MG extended release tablet; Take 1 tablet by mouth daily  Dispense: 90 tablet; Refill: 1  - furosemide (LASIX) 40 MG tablet; Take 1 tablet by mouth daily  Dispense: 60 tablet; Refill: 3  - spironolactone (ALDACTONE) 25 MG tablet; Take 1 tablet by mouth daily  Dispense: 90 tablet; Refill: 1    4. CKD (chronic kidney disease) stage 3, GFR 30-59 ml/min ([facility])  - [place] DISCHARGE MEDS RECONCILED W/ CURRENT OUTPATIENT MED LIST  - Just had labs drawn at rehab prior to discharge, will request results   - scheduled to follow up with nephrology next week. Continue current regimen.          Medical Decision Making: moderate complexity

## 2019-06-12 ENCOUNTER — CARE COORDINATION (OUTPATIENT)
Dept: CARE COORDINATION | Age: 79
End: 2019-06-12

## 2019-06-12 NOTE — CARE COORDINATION
Spoke with Prosper from [facility]. She is currently at the home of Vitaliy. She states that Vitaliy arrived home from SNF on 6/8/19. She will receive SN weekly along with PT/OT. Vitaliy has declined an aide. She is doing a medication review at this time and will let office know if there are any discrepancies, so far the only difference she has is that the gabapentin was discontinued. She reports SpO2 of 93% that decreased to 90% while talking. She states that she is going to ambulate Vitaliy and see how she does. She also reports BP was 104/58.

## 2019-06-12 NOTE — CARE COORDINATION
Reviewed chart with Daryl Early. Refills for medications were sent to pharmacy on 6/10/19. Called Martina Hernandes back and notified him that refills for nifedipine, furosemide, and spironolactone where sent to pharmacy on 6/10/19. He voiced understanding.

## 2019-06-12 NOTE — CARE COORDINATION
daily 5/28/19   Radha Terry MD   lidocaine (XYLOCAINE) 5 % ointment Apply 1 Applicatorful topically as needed for Pain Apply topically as needed. Historical Provider, MD MONSON 2.5 MG TABS tablet Take 1 tablet by mouth 2 times daily 5/20/19   BIJAN Poe   propafenone (RYTHMOL) 150 MG tablet Take 1 tablet by mouth every 8 hours 5/6/19   Rohan , APRN - CNP   metoprolol tartrate (LOPRESSOR) 50 MG tablet Take 1 tablet by mouth 2 times daily 4/29/19   BIJAN Poe   DULoxetine (CYMBALTA) 60 MG extended release capsule Take 1 capsule by mouth daily 4/22/19   BIJNA Poe   gabapentin (NEURONTIN) 100 MG capsule Take 1 capsule by mouth every evening for 90 days. . 2/19/19 6/10/19  Doug Tracey MD   ipratropium-albuterol (DUONEB) 0.5-2.5 (3) MG/3ML SOLN nebulizer solution Inhale 3 mLs into the lungs every 6 hours as needed for Shortness of Breath DX: J45.40 2/6/19   BIJAN Poe   carbidopa-levodopa (SINEMET)  MG per tablet TAKE 1 TABLET BY MOUTH 3 TIMES A DAY 7/30/18   Rashid Martino MD   atorvastatin (LIPITOR) 40 MG tablet Take 1 and 1/2 tablets by mouth every evening 7/30/18   Rashid Martino MD   montelukast (SINGULAIR) 10 MG tablet Take 1 tablet by mouth daily. 7/30/18   Rashid Martino MD   albuterol sulfate HFA (VENTOLIN HFA) 108 (90 Base) MCG/ACT inhaler Inhale 2 puffs into the lungs every 6 hours as needed for Wheezing 7/30/18   Doug Tracey MD   fluticasone (FLONASE) 50 MCG/ACT nasal spray USE 2 PUFFS IN EACH NOSTRIL DAILY  Patient taking differently: USE 2 PUFFS IN EACH NOSTRIL DAILY prn 7/24/18   Rashid Martino MD   salmeterol (SEREVENT) 50 MCG/DOSE diskus inhaler Inhale 1 puff by mouth 2 times a day.  5/11/18   Doug Tracey MD   lamoTRIgine (LAMICTAL) 25 MG tablet Take 50 mg by mouth 2 times daily     Historical Provider, MD   Multiple Vitamins-Minerals (THERAPEUTIC MULTIVITAMIN-MINERALS) tablet Take 1 tablet by mouth daily    Historical Provider, MD   Alpha-Lipoic Acid 300 MG CAPS Take 300 mg by mouth daily    Historical Provider, MD   Handicap Placard MISC by Does not apply route. 12/12/14-12/12/19 12/12/14   Connor Martino MD   solifenacin (VESICARE) 5 MG tablet Take 1 tablet by mouth daily. 6/25/13 10/29/13  Severa Necessary, MD   aspirin EC 81 MG EC tablet Take 1 tablet by mouth daily. Start taking next week. Indications: OTC 2/25/13   Maxine Layton MD       Future Appointments   Date Time Provider Katy Ade   7/15/2019 10:30 AM Severa Necessary, MD [facility]   7/25/2019  1:45 PM MD Kenya Suazo [facility]   8/5/2019 10:00 AM Severa Necessary, MD [facility]   11/13/2019 10:30 AM Barb Queen APRN - CNP [facility]   4/6/2020 10:00 AM SCHEDULE, [facility]     ,   Diabetes Assessment    Medic Alert ID:  No  Meal Planning:  Avoidance of concentrated sweets   How often do you test your blood sugar?:  Daily   Do you have barriers with adherence to non-pharmacologic self-management interventions?  (Nutrition/Exercise/Self-Monitoring):  No   Have you ever had to go to the ED for symptoms of low blood sugar?:  No       No patient-reported symptoms      ,   Congestive Heart Failure Assessment       No patient-reported symptoms      Symptoms:         ,   COPD Assessment    Does the patient understand envrionmental exposure?:  Yes  Is the patient able to verbalize Rescue vs. Long Acting medications?:  Yes  Does the patient have a nebulizer?:  Yes  Does the patient use a space with inhaled medications?:  Yes            Symptoms:      Self Monitoring - SaO2:  Yes (Comment: Patient's  reports having pulse ox at home)  Baseline SaO2 Reading:   (Comment: Nurse for [facility] at home and reports SaO2 while resting is 93%. )      and   General Assessment

## 2019-06-13 ENCOUNTER — CARE COORDINATION (OUTPATIENT)
Dept: CARE COORDINATION | Age: 79
End: 2019-06-13

## 2019-06-13 NOTE — TELEPHONE ENCOUNTER
Ramirez Irby, nurse with [facility] on Dr Susan Ricketts instructions to continue to monitor patient. Walter Ho reports they will and that she has SN, PT, and OT making visits. She will let office know of any concerns.

## 2019-06-13 NOTE — CARE COORDINATION
Received call from Milton Garcia with [facility]. She wanted to update on pt after her PT visit yesterday. Patient reported to PT that she felt weak and faint while ambulating. PT reported her SpO2 was low (no reading provided) and she was not wearing her O2 after O2 was given SpO2 increased to 98%. PT also checked BP it was 122/62 while sitting and 91/71 when standing. Nurse has already left patient a message and will talk to patient today. She will do a visit today if needed, if not she will do a visit tomorrow (6/14/19).

## 2019-06-16 ASSESSMENT — ENCOUNTER SYMPTOMS: SHORTNESS OF BREATH: 1

## 2019-06-19 RX ORDER — FLUTICASONE PROPIONATE 50 MCG
SPRAY, SUSPENSION (ML) NASAL
Qty: 1 BOTTLE | Refills: 11 | Status: SHIPPED | OUTPATIENT
Start: 2019-06-19 | End: 2020-05-14

## 2019-06-19 NOTE — TELEPHONE ENCOUNTER
.  Last office visit 6-10-19    Last written 7-24-18 2 with 12      Next office visit scheduled 7-15-19  Requested Prescriptions     Pending Prescriptions Disp Refills    fluticasone (FLONASE) 50 MCG/ACT nasal spray [Pharmacy Med Name: FLUTICASONE 50 MCG NASAL SPR]  11     Sig: USE 2 PUFFS IN EACH NOSTRIL DAILY

## 2019-06-20 ENCOUNTER — CARE COORDINATION (OUTPATIENT)
Dept: CARE COORDINATION | Age: 79
End: 2019-06-20

## 2019-06-20 ASSESSMENT — ENCOUNTER SYMPTOMS: DYSPNEA ASSOCIATED WITH: MINIMAL EXERTION

## 2019-06-20 NOTE — CARE COORDINATION
Ambulatory Care Coordination Note  6/20/2019  CM Risk Score: 13  Theron Mortality Risk Score: 32    ACC: Gillian Olivarez RN    Summary Note: Outreach call made to Vitaliy. She states that she is feeling a little stronger than she was last week. She feels PT has been helpful. She gave the phone to her  Dashawn Landeros for me to speak with. CC Protocol reviewed. Dashawn Landeros states that she is \"gaining strength\", but they have concerns about her BP dropping when she stands. Dashawn Tigre states that [facility] nurse is coming today to check BP and that [facility] nurse and PT are sending notes to Dr Gucci William. He reports that her SpO2 is 90% but drops to 80's during exercise with PT. Asked if Vitaliy is wearing oxygen during PT exercises and he reports \"no she only uses oxygen at night\". Does not seem receptive to her wearing oxygen during day. Reports daily BS was 142 and daily wt was 244, denies any concerns with DM or CHF. Encouraged to call with questions/concerns. Plan:  F/u on PT visits and BP concerns. Wearing O2 during the day especially when PT is there. Continue to support spouse and patient in self management of chronic conditions.        Care Coordination Interventions    Program Enrollment:  Complex Care  Referral from Primary Care Provider:  No  Suggested Interventions and [address] [place] [address] Hwy:  Completed (Comment: [facility] 6/2019 Nurse is Kelsey Disla)  Occupational Therapy:  Completed (Comment: [facility] 6/2019)  Physical Therapy:  Completed (Comment: [facility] 6/2019)  Other Therapy Services:  Completed (Comment: Speech )         Goals Addressed                 This Visit's Progress     Reduce Falls    No change     I will reduce my risk of falls by the following: Using grab bars, asking for help when needed    Barriers: impairment:  forgetful and overwhelmed by complexity of regimen  Plan for overcoming my barriers: I will ask  for help when needed  Confidence: 5/10  Anticipated Goal Completion Date: 7/1/19 Prior to Admission medications    Medication Sig Start Date End Date Taking? Authorizing Provider   fluticasone (FLONASE) 50 MCG/ACT nasal spray USE 2 PUFFS IN [facility] NOSTRIL DAILY 6/19/19   BIJAN Betancur   NIFEdipine (ADALAT CC) 60 MG extended release tablet Take 1 tablet by mouth daily 6/10/19   BIJAN Betancur   furosemide (LASIX) 40 MG tablet Take 1 tablet by mouth daily 6/10/19   BIJAN Betancur   spironolactone (ALDACTONE) 25 MG tablet Take 1 tablet by mouth daily 6/10/19   BIJAN Betancur   guaiFENesin [facility]) 600 MG extended release tablet Take 1 tablet by mouth 2 times daily 5/28/19   Lalitha Corrales MD   lidocaine (XYLOCAINE) 5 % ointment Apply 1 Applicatorful topically as needed for Pain Apply topically as needed. Historical Provider, MD   ELIQUIS 2.5 MG TABS tablet Take 1 tablet by mouth 2 times daily 5/20/19   BIJAN Betancur   propafenone (RYTHMOL) 150 MG tablet Take 1 tablet by mouth every 8 hours 5/6/19   MAHAD Escalera - WICHO   metoprolol tartrate (LOPRESSOR) 50 MG tablet Take 1 tablet by mouth 2 times daily 4/29/19   BIJAN Betancur   DULoxetine (CYMBALTA) 60 MG extended release capsule Take 1 capsule by mouth daily 4/22/19   BIJAN Betancur   gabapentin (NEURONTIN) 100 MG capsule Take 1 capsule by mouth every evening for 90 days. . 2/19/19 6/10/19  Devonte Colbert MD   ipratropium-albuterol (DUONEB) 0.5-2.5 (3) MG/3ML SOLN nebulizer solution Inhale 3 mLs into the lungs every 6 hours as needed for Shortness of Breath DX: J45.40 2/6/19   BIJAN Betancur   budesonide (PULMICORT FLEXHALER) 180 MCG/ACT AEPB inhaler Inhale 2 puffs into the lungs 2 times daily 1/9/19   Bettyjane Creeks, PA   carbidopa-levodopa (SINEMET)  MG per tablet TAKE 1 TABLET BY MOUTH 3 TIMES A DAY 7/30/18   Crystal Martino MD   atorvastatin (LIPITOR) 40 MG tablet Take 1 and 1/2 tablets by mouth every evening 7/30/18   Crystal Martino MD   montelukast (SINGULAIR) 10 MG tablet Take 1 tablet by mouth daily. 7/30/18   Astrid Martino MD   albuterol sulfate HFA (VENTOLIN HFA) 108 (90 Base) MCG/ACT inhaler Inhale 2 puffs into the lungs every 6 hours as needed for Wheezing 7/30/18   Yoav Malloy MD   salmeterol (SEREVENT) 50 MCG/DOSE diskus inhaler Inhale 1 puff by mouth 2 times a day. 5/11/18   Yoav Malloy MD   lamoTRIgine (LAMICTAL) 25 MG tablet Take 50 mg by mouth 2 times daily     Historical Provider, MD   Multiple Vitamins-Minerals (THERAPEUTIC MULTIVITAMIN-MINERALS) tablet Take 1 tablet by mouth daily    Historical Provider, MD   Alpha-Lipoic Acid 300 MG CAPS Take 300 mg by mouth daily    Historical Provider, MD   Handicap Placard MISC by Does not apply route. 12/12/14-12/12/19 12/12/14   Astrid Martino MD   solifenacin (VESICARE) 5 MG tablet Take 1 tablet by mouth daily. 6/25/13 10/29/13  Yoav Malloy MD   aspirin EC 81 MG EC tablet Take 1 tablet by mouth daily. Start taking next week. Indications: OTC 2/25/13   Berta Grande MD       Future Appointments   Date Time Provider Katy Booker   7/15/2019  1:30 PM MD BIGG Johnson [facility]   7/25/2019  1:45 PM MD Elsie Orosco [facility]   8/5/2019  1:00 PM MD BIGG Johnson [facility]   11/13/2019 10:30 AM MAHAD Maciel CNP [facility]   4/6/2020 10:00 AM SCHEDULE, [facility] L[facility]     ,   Diabetes Assessment    Medic Alert ID:  No  Meal Planning:  Avoidance of concentrated sweets   How often do you test your blood sugar?:  Daily   Do you have barriers with adherence to non-pharmacologic self-management interventions?  (Nutrition/Exercise/Self-Monitoring):  No   Have you ever had to go to the ED for symptoms of low blood sugar?:  No       No patient-reported symptoms   Do you have hyperglycemia symptoms?:  No   Do you have hypoglycemia symptoms?:  No   Last Blood Sugar Value:  142   Blood Sugar Monitoring Regimen:  Morning Fasting   Blood Sugar Trends:   (Comment: Pt's spouse reports running slightly higher than usual)      ,   Congestive Heart Failure Assessment       No patient-reported symptoms      Symptoms:      Symptom course:  stable  Patient-reported weight (lb):  244 (Comment: Pt's spouse reports that wt fluctuates between 242 and 244)  Weight trend:  stable     ,   COPD Assessment    Does the patient understand envrionmental exposure?:  Yes  Is the patient able to verbalize Rescue vs. Long Acting medications?:  Yes  Does the patient have a nebulizer?:  Yes  Does the patient use a space with inhaled medications?:  Yes     No patient-reported symptoms         Symptoms:      Breathlessness:  minimal exertion  Change in chronic cough?:  No/At Baseline  Change in sputum?:  No/At Baseline  Self Monitoring - SaO2:  Yes  Baseline SaO2 Readin (Comment: Pt's spouse reports that BS will drop when pt is exercisng with PT.  Not wearing O2 during the day)      and   General Assessment    Do you have any symptoms that are causing concern?:  No

## 2019-06-21 ENCOUNTER — OFFICE VISIT (OUTPATIENT)
Dept: FAMILY MEDICINE CLINIC | Age: 79
End: 2019-06-21
Payer: MEDICARE

## 2019-06-21 VITALS
TEMPERATURE: 99.5 F | OXYGEN SATURATION: 90 % | SYSTOLIC BLOOD PRESSURE: 124 MMHG | HEART RATE: 78 BPM | DIASTOLIC BLOOD PRESSURE: 66 MMHG

## 2019-06-21 DIAGNOSIS — E86.1 HYPOTENSION DUE TO HYPOVOLEMIA: Primary | ICD-10-CM

## 2019-06-21 DIAGNOSIS — G20 PARKINSON'S DISEASE (HCC): ICD-10-CM

## 2019-06-21 DIAGNOSIS — I10 ESSENTIAL HYPERTENSION: ICD-10-CM

## 2019-06-21 DIAGNOSIS — I95.89 HYPOTENSION DUE TO HYPOVOLEMIA: Primary | ICD-10-CM

## 2019-06-21 LAB
ALBUMIN SERPL-MCNC: 4.2 G/DL (ref 3.4–5)
ANION GAP SERPL CALCULATED.3IONS-SCNC: 14 MMOL/L (ref 3–16)
BUN BLDV-MCNC: 36 MG/DL (ref 7–20)
CALCIUM SERPL-MCNC: 9.8 MG/DL (ref 8.3–10.6)
CHLORIDE BLD-SCNC: 98 MMOL/L (ref 99–110)
CO2: 30 MMOL/L (ref 21–32)
CREAT SERPL-MCNC: 2 MG/DL (ref 0.6–1.2)
GFR AFRICAN AMERICAN: 29
GFR NON-AFRICAN AMERICAN: 24
GLUCOSE BLD-MCNC: 106 MG/DL (ref 70–99)
PHOSPHORUS: 4.5 MG/DL (ref 2.5–4.9)
POTASSIUM SERPL-SCNC: 4.5 MMOL/L (ref 3.5–5.1)
SODIUM BLD-SCNC: 142 MMOL/L (ref 136–145)

## 2019-06-21 PROCEDURE — 4040F PNEUMOC VAC/ADMIN/RCVD: CPT | Performed by: INTERNAL MEDICINE

## 2019-06-21 PROCEDURE — 1036F TOBACCO NON-USER: CPT | Performed by: INTERNAL MEDICINE

## 2019-06-21 PROCEDURE — 1123F ACP DISCUSS/DSCN MKR DOCD: CPT | Performed by: INTERNAL MEDICINE

## 2019-06-21 PROCEDURE — G8598 ASA/ANTIPLAT THER USED: HCPCS | Performed by: INTERNAL MEDICINE

## 2019-06-21 PROCEDURE — 1111F DSCHRG MED/CURRENT MED MERGE: CPT | Performed by: INTERNAL MEDICINE

## 2019-06-21 PROCEDURE — G8417 CALC BMI ABV UP PARAM F/U: HCPCS | Performed by: INTERNAL MEDICINE

## 2019-06-21 PROCEDURE — G8400 PT W/DXA NO RESULTS DOC: HCPCS | Performed by: INTERNAL MEDICINE

## 2019-06-21 PROCEDURE — G8427 DOCREV CUR MEDS BY ELIG CLIN: HCPCS | Performed by: INTERNAL MEDICINE

## 2019-06-21 PROCEDURE — 1090F PRES/ABSN URINE INCON ASSESS: CPT | Performed by: INTERNAL MEDICINE

## 2019-06-21 PROCEDURE — 99214 OFFICE O/P EST MOD 30 MIN: CPT | Performed by: INTERNAL MEDICINE

## 2019-06-21 NOTE — PROGRESS NOTES
([facility] [place] [facility]    Type 2 diabetes mellitus with stage 3 chronic kidney disease, without long-term current use of insulin (HCC)    BETSY treated with BiPAP    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    Essential hypertension  -     Renal Function Panel    Parkinson's disease ([facility] [place] [facility]      Instructions:    See patient goals. I have reviewed my findings and recommendations with Bam Galloway. Geovanny Speaker MD    On the basis of positive falls risk screening, assessment and plan is as follows: home safety tips provided, use walker for ambulation.     On the basis of positive falls risk screening, assessment and plan is as follows: continue PT.

## 2019-06-22 ASSESSMENT — ENCOUNTER SYMPTOMS: COUGH: 0

## 2019-06-26 DIAGNOSIS — I50.32 CHRONIC DIASTOLIC CONGESTIVE HEART FAILURE (HCC): ICD-10-CM

## 2019-06-26 RX ORDER — FUROSEMIDE 40 MG/1
40 TABLET ORAL DAILY
Qty: 90 TABLET | Refills: 1 | Status: SHIPPED | OUTPATIENT
Start: 2019-06-26 | End: 2019-11-14 | Stop reason: SDUPTHER

## 2019-06-26 NOTE — TELEPHONE ENCOUNTER
Patient requesting a medication refill.   Medication:  furosemide (LASIX) 40 MG tablet  Pharmacy: [facility], [address] [place] [address] [phone]  Last office visit: 06/21/2019  Next office visit: 7/15/2019

## 2019-06-26 NOTE — TELEPHONE ENCOUNTER
Verbal ok to send again per Jabari Wade- she  just approve this medication 6/10/19 but in was sent for wrong amount for - needed to be #90 mail order service.

## 2019-07-03 ENCOUNTER — CARE COORDINATION (OUTPATIENT)
Dept: CARE COORDINATION | Age: 79
End: 2019-07-03

## 2019-07-03 NOTE — CARE COORDINATION
Ambulatory Care Coordination Note  7/3/2019  CM Risk Score: 13  Charlson 10 Year Mortality Risk Score: 100%     ACC: Zarina Sancehs, RN    Summary Note: Outreach call made and spoke with Bennie Padilla . He states that the therapist is there now, but that Castro Kirkland is \"doing better\". He states decreasing the lasix to every other day has helped with dizziness and has also decreased Castro Kirkland having urinary accidents. He states that Jeremiah Feliz seems to be doing better\". He denies any needs from care management at this time. Encouraged to call with questions/concerns. Plan:  Offer info on free DM and CHF classes  Review zone management tools for CHF and COPD  Continue to support patient and spouse in self management of chronic conditions. Care Coordination Interventions    Program Enrollment:  Complex Care  Referral from Primary Care Provider:  No  Suggested Interventions and [address]:  Completed (Comment: [facility] 6/2019 Nurse is Narciso Almaguer)  Occupational Therapy:  Completed (Comment: [facility] 6/2019)  Physical Therapy:  Completed (Comment: [facility] 6/2019)  Other Therapy Services:  Completed (Comment: Speech )         Goals Addressed                 This Visit's Progress     Reduce Falls    Improving     I will reduce my risk of falls by the following: Using grab bars, asking for help when needed    Barriers: impairment:  forgetful and overwhelmed by complexity of regimen  Plan for overcoming my barriers: I will ask  for help when needed  Confidence: 5/10  Anticipated Goal Completion Date: 7/1/19            Prior to Admission medications    Medication Sig Start Date End Date Taking?  Authorizing Provider   furosemide (LASIX) 40 MG tablet Take 1 tablet by mouth daily 6/26/19   Virgilio Giles MD   fluticasone (FLONASE) 50 MCG/ACT nasal spray USE 2 PUFFS IN [facility] NOSTRIL DAILY 6/19/19   BIJAN Carranza   NIFEdipine (ADALAT CC) 60 MG extended release tablet Take 1 tablet by mouth daily tablet by mouth daily    Historical Provider, MD   Alpha-Lipoic Acid 300 MG CAPS Take 300 mg by mouth daily    Historical Provider, MD   Handicap Placard MISC by Does not apply route. 12/12/14-12/12/19 12/12/14   Blane Martino MD   solifenacin (VESICARE) 5 MG tablet Take 1 tablet by mouth daily. 6/25/13 10/29/13  Drew Goodman MD   aspirin EC 81 MG EC tablet Take 1 tablet by mouth daily. Start taking next week. Indications: OTC 2/25/13   Shan Calderon MD       Future Appointments   Date Time Provider Katy Booker   7/15/2019  1:30 PM MD BIGG Radfrod [facility]   7/25/2019  1:45 PM Liberty Ball MD beatlab [facility]   8/5/2019  1:00 PM MD BIGG Radford [facility]   11/13/2019 10:30 AM MAHAD Elizondo CNP [facility]   4/6/2020 10:00 AM SCHEDULE, MHC[facility]G FP AWV LPN [facility]     ,   Diabetes Assessment    Medic Alert ID:  No  Meal Planning:  Avoidance of concentrated sweets   How often do you test your blood sugar?:  Daily   Do you have barriers with adherence to non-pharmacologic self-management interventions?  (Nutrition/Exercise/Self-Monitoring):  No   Have you ever had to go to the ED for symptoms of low blood sugar?:  No       No patient-reported symptoms      ,   Congestive Heart Failure Assessment       No patient-reported symptoms      Symptoms:         ,   COPD Assessment    Does the patient understand envrionmental exposure?:  Yes  Is the patient able to verbalize Rescue vs. Long Acting medications?:  Yes  Does the patient have a nebulizer?:  Yes  Does the patient use a space with inhaled medications?:  Yes     No patient-reported symptoms         Symptoms:          and   General Assessment    Do you have any symptoms that are causing concern?:  No

## 2019-07-10 ENCOUNTER — TELEPHONE (OUTPATIENT)
Dept: FAMILY MEDICINE CLINIC | Age: 79
End: 2019-07-10

## 2019-07-15 ENCOUNTER — CARE COORDINATION (OUTPATIENT)
Dept: CARE COORDINATION | Age: 79
End: 2019-07-15

## 2019-07-15 ENCOUNTER — OFFICE VISIT (OUTPATIENT)
Dept: FAMILY MEDICINE CLINIC | Age: 79
End: 2019-07-15
Payer: MEDICARE

## 2019-07-15 VITALS
DIASTOLIC BLOOD PRESSURE: 76 MMHG | WEIGHT: 250.2 LBS | HEART RATE: 70 BPM | SYSTOLIC BLOOD PRESSURE: 130 MMHG | OXYGEN SATURATION: 90 % | BODY MASS INDEX: 40.38 KG/M2

## 2019-07-15 DIAGNOSIS — H69.82 EUSTACHIAN TUBE DYSFUNCTION, LEFT: ICD-10-CM

## 2019-07-15 DIAGNOSIS — I48.0 PAF (PAROXYSMAL ATRIAL FIBRILLATION) (HCC): ICD-10-CM

## 2019-07-15 DIAGNOSIS — E11.22 TYPE 2 DIABETES MELLITUS WITH STAGE 3 CHRONIC KIDNEY DISEASE, WITHOUT LONG-TERM CURRENT USE OF INSULIN (HCC): ICD-10-CM

## 2019-07-15 DIAGNOSIS — E66.01 MORBID OBESITY WITH BMI OF 40.0-44.9, ADULT (HCC): ICD-10-CM

## 2019-07-15 DIAGNOSIS — N18.30 TYPE 2 DIABETES MELLITUS WITH STAGE 3 CHRONIC KIDNEY DISEASE, WITHOUT LONG-TERM CURRENT USE OF INSULIN (HCC): ICD-10-CM

## 2019-07-15 DIAGNOSIS — J44.9 CHRONIC OBSTRUCTIVE PULMONARY DISEASE, UNSPECIFIED COPD TYPE (HCC): ICD-10-CM

## 2019-07-15 DIAGNOSIS — R42 POSTURAL DIZZINESS: Primary | ICD-10-CM

## 2019-07-15 DIAGNOSIS — N18.30 CKD (CHRONIC KIDNEY DISEASE) STAGE 3, GFR 30-59 ML/MIN (HCC): ICD-10-CM

## 2019-07-15 PROBLEM — Z99.2 STAGE 5 CHRONIC KIDNEY DISEASE ON CHRONIC DIALYSIS (HCC): Status: ACTIVE | Noted: 2019-02-07

## 2019-07-15 PROBLEM — N18.6 STAGE 5 CHRONIC KIDNEY DISEASE ON CHRONIC DIALYSIS (HCC): Status: ACTIVE | Noted: 2019-02-07

## 2019-07-15 PROBLEM — D50.0 ANEMIA DUE TO CHRONIC BLOOD LOSS: Status: ACTIVE | Noted: 2019-07-15

## 2019-07-15 PROCEDURE — 3023F SPIROM DOC REV: CPT | Performed by: INTERNAL MEDICINE

## 2019-07-15 PROCEDURE — 99213 OFFICE O/P EST LOW 20 MIN: CPT | Performed by: INTERNAL MEDICINE

## 2019-07-15 PROCEDURE — G8400 PT W/DXA NO RESULTS DOC: HCPCS | Performed by: INTERNAL MEDICINE

## 2019-07-15 PROCEDURE — 1123F ACP DISCUSS/DSCN MKR DOCD: CPT | Performed by: INTERNAL MEDICINE

## 2019-07-15 PROCEDURE — 4040F PNEUMOC VAC/ADMIN/RCVD: CPT | Performed by: INTERNAL MEDICINE

## 2019-07-15 PROCEDURE — 1090F PRES/ABSN URINE INCON ASSESS: CPT | Performed by: INTERNAL MEDICINE

## 2019-07-15 PROCEDURE — 1036F TOBACCO NON-USER: CPT | Performed by: INTERNAL MEDICINE

## 2019-07-15 PROCEDURE — G8417 CALC BMI ABV UP PARAM F/U: HCPCS | Performed by: INTERNAL MEDICINE

## 2019-07-15 PROCEDURE — G8428 CUR MEDS NOT DOCUMENT: HCPCS | Performed by: INTERNAL MEDICINE

## 2019-07-15 PROCEDURE — G8598 ASA/ANTIPLAT THER USED: HCPCS | Performed by: INTERNAL MEDICINE

## 2019-07-15 PROCEDURE — G8926 SPIRO NO PERF OR DOC: HCPCS | Performed by: INTERNAL MEDICINE

## 2019-07-15 RX ORDER — MONTELUKAST SODIUM 10 MG/1
TABLET ORAL
Qty: 90 TABLET | Refills: 1 | Status: SHIPPED | OUTPATIENT
Start: 2019-07-15 | End: 2020-01-24 | Stop reason: SDUPTHER

## 2019-07-15 RX ORDER — DULOXETIN HYDROCHLORIDE 60 MG/1
60 CAPSULE, DELAYED RELEASE ORAL DAILY
Qty: 90 CAPSULE | Refills: 1 | Status: SHIPPED | OUTPATIENT
Start: 2019-07-15 | End: 2020-01-15

## 2019-07-15 ASSESSMENT — ENCOUNTER SYMPTOMS: COUGH: 0

## 2019-07-15 NOTE — CARE COORDINATION
Met with Iam Cardona and Wicho William briefly after her appointment with Dr. Mandi Kelly. Wicho William has an appointment this afternoon also. Introduced myself as the ACM that has been calling her. Contact information provided. Both Wicho William and Iam Cardona denies needs from care management at this time. Encouraged to call with questions/concerns. Plan:     F/u on office visit.

## 2019-07-15 NOTE — PROGRESS NOTES
daily Yes BIJAN Simmons   propafenone (RYTHMOL) 150 MG tablet Take 1 tablet by mouth every 8 hours Yes MAHAD Urbano - WICHO   metoprolol tartrate (LOPRESSOR) 50 MG tablet Take 1 tablet by mouth 2 times daily Yes BIJAN Simmons   ipratropium-albuterol (DUONEB) 0.5-2.5 (3) MG/3ML SOLN nebulizer solution Inhale 3 mLs into the lungs every 6 hours as needed for Shortness of Breath DX: J45.40 Yes BIJAN Simmons   budesonide (PULMICORT FLEXHALER) 180 MCG/ACT AEPB inhaler Inhale 2 puffs into the lungs 2 times daily Yes BIJAN Simmons   carbidopa-levodopa (SINEMET)  MG per tablet TAKE 1 TABLET BY MOUTH 3 TIMES A DAY Yes Drew Goodman MD   atorvastatin (LIPITOR) 40 MG tablet Take 1 and 1/2 tablets by mouth every evening Yes Drew Goodman MD   albuterol sulfate HFA (VENTOLIN HFA) 108 (90 Base) MCG/ACT inhaler Inhale 2 puffs into the lungs every 6 hours as needed for Wheezing Yes Drew Goodman MD   salmeterol (SEREVENT) 50 MCG/DOSE diskus inhaler Inhale 1 puff by mouth 2 times a day. Yes Drew Goodman MD   lamoTRIgine (LAMICTAL) 25 MG tablet Take 50 mg by mouth 2 times daily  Yes Historical Provider, MD   Multiple Vitamins-Minerals (THERAPEUTIC MULTIVITAMIN-MINERALS) tablet Take 1 tablet by mouth daily Yes Historical Provider, MD   Alpha-Lipoic Acid 300 MG CAPS Take 300 mg by mouth daily Yes Historical Provider, MD   Handicap Placard MISC by Does not apply route. 12/12/14-12/12/19 Yes Drew Goodman MD   aspirin EC 81 MG EC tablet Take 1 tablet by mouth daily. Start taking next week. Indications: OTC Yes Shan Calderon MD   solifenacin (VESICARE) 5 MG tablet Take 1 tablet by mouth daily.   Drew Goodman MD        Social History     Tobacco Use    Smoking status: Never Smoker    Smokeless tobacco: Never Used   Substance Use Topics    Alcohol use: No     Alcohol/week: 0.0 standard drinks        Vitals:    07/15/19 1341 07/15/19 1350   BP: 132/84  Comment: sitting 130/76   Site: Left Upper Arm Right Upper Arm   Position: Sitting Standing   Cuff Size: Large Adult Large Adult   Pulse: 70    SpO2: 90%    Weight: 250 lb 3.2 oz (113.5 kg)  Comment: taken by nurse at home 7/15/19      Estimated body mass index is 40.38 kg/m² as calculated from the following:    Height as of 6/10/19: 5' 6\" (1.676 m). Weight as of this encounter: 250 lb 3.2 oz (113.5 kg). Physical Exam   Constitutional: She is oriented to person, place, and time. HENT:   Mouth/Throat: Oropharynx is clear and moist.   Left tm retracted and thickened, no erythema   Eyes: Conjunctivae are normal. No scleral icterus. Neck: No JVD present. No thyromegaly present. Cardiovascular: Normal rate, regular rhythm, normal heart sounds and intact distal pulses. Exam reveals no gallop and no friction rub. No murmur heard. Pulmonary/Chest: Effort normal and breath sounds normal. She has no wheezes. She has no rales. Abdominal: Soft. Bowel sounds are normal. She exhibits no distension and no mass. There is no hepatosplenomegaly. There is no tenderness. No hernia. Musculoskeletal: She exhibits no edema. Lymphadenopathy:     She has no cervical adenopathy. Neurological: She is alert and oriented to person, place, and time. Skin: No rash noted. Psychiatric: She has a normal mood and affect. Vitals reviewed. ASSESSMENT/PLAN:  Nelly Machado was seen today for hypertension, allergic rhinitis  and depression. Diagnoses and all orders for this visit:    Postural dizziness    CKD (chronic kidney disease) stage 3, GFR 30-59 ml/min ([facility])  -     Renal Function Panel    Eustachian tube dysfunction, left    Other orders  -     DULoxetine (CYMBALTA) 60 MG extended release capsule; Take 1 capsule by mouth daily  -     montelukast (SINGULAIR) 10 MG tablet; Take 1 tablet by mouth daily. Take lasix with 5 pound weight gain 2 to 3 days, then back to every other day.  flonase twice daily for ear congestion, consider steroid pack if not improving. No follow-ups on file. An electronic signature was used to authenticate this note.     --Geneva Chatterjee MD on 7/15/2019 at 2:23 PM

## 2019-07-16 LAB
ALBUMIN SERPL-MCNC: 4.3 G/DL (ref 3.4–5)
ANION GAP SERPL CALCULATED.3IONS-SCNC: 15 MMOL/L (ref 3–16)
BUN BLDV-MCNC: 43 MG/DL (ref 7–20)
CALCIUM SERPL-MCNC: 9.5 MG/DL (ref 8.3–10.6)
CHLORIDE BLD-SCNC: 102 MMOL/L (ref 99–110)
CO2: 26 MMOL/L (ref 21–32)
CREAT SERPL-MCNC: 1.6 MG/DL (ref 0.6–1.2)
GFR AFRICAN AMERICAN: 38
GFR NON-AFRICAN AMERICAN: 31
GLUCOSE BLD-MCNC: 131 MG/DL (ref 70–99)
PHOSPHORUS: 4.3 MG/DL (ref 2.5–4.9)
POTASSIUM SERPL-SCNC: 5.2 MMOL/L (ref 3.5–5.1)
SODIUM BLD-SCNC: 143 MMOL/L (ref 136–145)

## 2019-07-18 ENCOUNTER — TELEPHONE (OUTPATIENT)
Dept: FAMILY MEDICINE CLINIC | Age: 79
End: 2019-07-18

## 2019-07-25 ENCOUNTER — OFFICE VISIT (OUTPATIENT)
Dept: CARDIOLOGY CLINIC | Age: 79
End: 2019-07-25
Payer: MEDICARE

## 2019-07-25 VITALS
SYSTOLIC BLOOD PRESSURE: 122 MMHG | HEART RATE: 69 BPM | DIASTOLIC BLOOD PRESSURE: 70 MMHG | OXYGEN SATURATION: 92 % | WEIGHT: 250.6 LBS | HEIGHT: 66 IN | BODY MASS INDEX: 40.27 KG/M2

## 2019-07-25 DIAGNOSIS — I48.0 PAROXYSMAL ATRIAL FIBRILLATION (HCC): Primary | ICD-10-CM

## 2019-07-25 DIAGNOSIS — I50.32 CHRONIC DIASTOLIC HEART FAILURE (HCC): ICD-10-CM

## 2019-07-25 DIAGNOSIS — I10 ESSENTIAL HYPERTENSION: Chronic | ICD-10-CM

## 2019-07-25 DIAGNOSIS — E78.2 MIXED HYPERLIPIDEMIA: Chronic | ICD-10-CM

## 2019-07-25 PROBLEM — I50.30 DIASTOLIC CONGESTIVE HEART FAILURE (HCC): Status: ACTIVE | Noted: 2019-01-17

## 2019-07-25 PROCEDURE — 1123F ACP DISCUSS/DSCN MKR DOCD: CPT | Performed by: INTERNAL MEDICINE

## 2019-07-25 PROCEDURE — 99214 OFFICE O/P EST MOD 30 MIN: CPT | Performed by: INTERNAL MEDICINE

## 2019-07-25 PROCEDURE — G8400 PT W/DXA NO RESULTS DOC: HCPCS | Performed by: INTERNAL MEDICINE

## 2019-07-25 PROCEDURE — G8427 DOCREV CUR MEDS BY ELIG CLIN: HCPCS | Performed by: INTERNAL MEDICINE

## 2019-07-25 PROCEDURE — 1090F PRES/ABSN URINE INCON ASSESS: CPT | Performed by: INTERNAL MEDICINE

## 2019-07-25 PROCEDURE — 4040F PNEUMOC VAC/ADMIN/RCVD: CPT | Performed by: INTERNAL MEDICINE

## 2019-07-25 PROCEDURE — 1036F TOBACCO NON-USER: CPT | Performed by: INTERNAL MEDICINE

## 2019-07-25 PROCEDURE — G8598 ASA/ANTIPLAT THER USED: HCPCS | Performed by: INTERNAL MEDICINE

## 2019-07-25 PROCEDURE — G8417 CALC BMI ABV UP PARAM F/U: HCPCS | Performed by: INTERNAL MEDICINE

## 2019-07-25 NOTE — LETTER
 DULoxetine (CYMBALTA) 60 MG extended release capsule Take 1 capsule by mouth daily 90 capsule 1    montelukast (SINGULAIR) 10 MG tablet Take 1 tablet by mouth daily. 90 tablet 1    furosemide (LASIX) 40 MG tablet Take 1 tablet by mouth daily (Patient taking differently: Take 40 mg by mouth See Admin Instructions Indications: taking one tablet every other day ) 90 tablet 1    fluticasone (FLONASE) 50 MCG/ACT nasal spray USE 2 PUFFS IN EACH NOSTRIL DAILY 1 Bottle 11    NIFEdipine (ADALAT CC) 60 MG extended release tablet Take 1 tablet by mouth daily 90 tablet 1    spironolactone (ALDACTONE) 25 MG tablet Take 1 tablet by mouth daily 90 tablet 1    guaiFENesin (MUCINEX) 600 MG extended release tablet Take 1 tablet by mouth 2 times daily 6 tablet 0    lidocaine (XYLOCAINE) 5 % ointment Apply 1 Applicatorful topically as needed for Pain Apply topically as needed.  ELIQUIS 2.5 MG TABS tablet Take 1 tablet by mouth 2 times daily 180 tablet 0    propafenone (RYTHMOL) 150 MG tablet Take 1 tablet by mouth every 8 hours 90 tablet 3    metoprolol tartrate (LOPRESSOR) 50 MG tablet Take 1 tablet by mouth 2 times daily 180 tablet 0    ipratropium-albuterol (DUONEB) 0.5-2.5 (3) MG/3ML SOLN nebulizer solution Inhale 3 mLs into the lungs every 6 hours as needed for Shortness of Breath DX: J45.40 360 mL 1    budesonide (PULMICORT FLEXHALER) 180 MCG/ACT AEPB inhaler Inhale 2 puffs into the lungs 2 times daily 3 each 1    carbidopa-levodopa (SINEMET)  MG per tablet TAKE 1 TABLET BY MOUTH 3 TIMES A DAY 90 tablet 1    albuterol sulfate HFA (VENTOLIN HFA) 108 (90 Base) MCG/ACT inhaler Inhale 2 puffs into the lungs every 6 hours as needed for Wheezing 3 Inhaler 1    salmeterol (SEREVENT) 50 MCG/DOSE diskus inhaler Inhale 1 puff by mouth 2 times a day.  180 each 1    lamoTRIgine (LAMICTAL) 25 MG tablet Take 50 mg by mouth 2 times daily       Multiple Vitamins-Minerals (THERAPEUTIC MULTIVITAMIN-MINERALS) tablet

## 2019-07-29 ENCOUNTER — CARE COORDINATION (OUTPATIENT)
Dept: FAMILY MEDICINE CLINIC | Age: 79
End: 2019-07-29

## 2019-08-09 ENCOUNTER — TELEPHONE (OUTPATIENT)
Dept: ADMINISTRATIVE | Age: 79
End: 2019-08-09

## 2019-08-12 ENCOUNTER — CARE COORDINATION (OUTPATIENT)
Dept: FAMILY MEDICINE CLINIC | Age: 79
End: 2019-08-12

## 2019-08-19 ENCOUNTER — CARE COORDINATION (OUTPATIENT)
Dept: FAMILY MEDICINE CLINIC | Age: 79
End: 2019-08-19

## 2019-08-19 RX ORDER — APIXABAN 2.5 MG/1
2.5 TABLET, FILM COATED ORAL 2 TIMES DAILY
Qty: 180 TABLET | Refills: 1 | Status: SHIPPED | OUTPATIENT
Start: 2019-08-19 | End: 2020-01-24 | Stop reason: SDUPTHER

## 2019-08-19 RX ORDER — METOPROLOL TARTRATE 50 MG/1
50 TABLET, FILM COATED ORAL 2 TIMES DAILY
Qty: 180 TABLET | Refills: 1 | Status: SHIPPED | OUTPATIENT
Start: 2019-08-19 | End: 2020-01-24 | Stop reason: SDUPTHER

## 2019-08-19 RX ORDER — PROPAFENONE HYDROCHLORIDE 150 MG/1
150 TABLET, FILM COATED ORAL EVERY 8 HOURS SCHEDULED
Qty: 90 TABLET | Refills: 2 | Status: SHIPPED | OUTPATIENT
Start: 2019-08-19 | End: 2019-11-14 | Stop reason: SDUPTHER

## 2019-08-19 NOTE — TELEPHONE ENCOUNTER
.  Last office visit 7/15/2019     Last written 4-29-19 180 with 0      Next office visit scheduled 10/18/2019    Requested Prescriptions     Pending Prescriptions Disp Refills    metoprolol tartrate (LOPRESSOR) 50 MG tablet [Pharmacy Med Name: METOPROLOL TART 50MG]       Sig: Take 1 tablet by mouth 2 times daily    ELIQUIS 2.5 MG TABS tablet [Pharmacy Med Name: ELIQUIS 2.5 MG TAB]       Sig: Take 1 tablet by mouth 2 times daily

## 2019-08-26 ENCOUNTER — CARE COORDINATION (OUTPATIENT)
Dept: CARE COORDINATION | Age: 79
End: 2019-08-26

## 2019-08-26 NOTE — CARE COORDINATION
Ambulatory Care Coordination Note  8/26/2019  CM Risk Score: 13  Charlson 10 Year Mortality Risk Score: 100%     ACC: Jeffrey Villegas, RN    Summary Note: Spoke with pt's spouse Kamran Wren. He states \"everything is good except for her blood pressure\". He reports BP this morning was 161/69, but states that it was 184/74 yesterday and over the weekend it was 180s/90s. Offered to schedule an appointment with Dr Chris Romero and he is agreeable. Appt scheduled for 8/30/19 at 2:30pm. He reports her daily wt is staying stable and denies any concerns with her DM or COPD. Encouraged Kamran Wren to call with questions/concerns. Plan:    F/u on office visit. F/u on BP      Care Coordination Interventions    Program Enrollment:  Complex Care  Referral from Primary Care Provider:  No  Suggested Interventions and [address]:  Completed (Comment: [facility] 6/2019 Nurse is Hannah Mcleod)  Occupational Therapy:  Completed (Comment: [facility] 6/2019)  Physical Therapy:  Completed (Comment: [facility] 6/2019)  Other Therapy Services:  Completed (Comment: Speech )         Goals Addressed                 This Visit's Progress     Reduce Falls    No change     I will reduce my risk of falls by the following: Using grab bars, asking for help when needed    Barriers: impairment:  forgetful and overwhelmed by complexity of regimen  Plan for overcoming my barriers: I will ask  for help when needed  Confidence: 5/10  Anticipated Goal Completion Date: 7/1/19            Prior to Admission medications    Medication Sig Start Date End Date Taking?  Authorizing Provider   propafenone (RYTHMOL) 150 MG tablet Take 1 tablet by mouth every 8 hours 8/19/19   Frank Do MD   metoprolol tartrate (LOPRESSOR) 50 MG tablet Take 1 tablet by mouth 2 times daily 8/19/19   BIJAN Mcarthur   ELIQUIS 2.5 MG TABS tablet Take 1 tablet by mouth 2 times daily 8/19/19   BIJAN Mcarthur   atorvastatin (LIPITOR) 40 MG tablet Take 1 and 1/2 tablets by (Comment: Pt's spouse reports gradully increasing BP.  Today's reading was 161/69)

## 2019-08-30 ENCOUNTER — HOSPITAL ENCOUNTER (OUTPATIENT)
Dept: GENERAL RADIOLOGY | Age: 79
Discharge: HOME | End: 2019-08-30
Payer: MEDICARE

## 2019-08-30 ENCOUNTER — OFFICE VISIT (OUTPATIENT)
Dept: FAMILY MEDICINE CLINIC | Age: 79
End: 2019-08-30
Payer: MEDICARE

## 2019-08-30 ENCOUNTER — HOSPITAL ENCOUNTER (OUTPATIENT)
Age: 79
Discharge: HOME | End: 2019-08-30
Payer: MEDICARE

## 2019-08-30 VITALS
SYSTOLIC BLOOD PRESSURE: 138 MMHG | OXYGEN SATURATION: 97 % | BODY MASS INDEX: 41.51 KG/M2 | DIASTOLIC BLOOD PRESSURE: 84 MMHG | WEIGHT: 257.2 LBS

## 2019-08-30 DIAGNOSIS — K59.04 CHRONIC IDIOPATHIC CONSTIPATION: Primary | ICD-10-CM

## 2019-08-30 DIAGNOSIS — I10 ESSENTIAL HYPERTENSION: ICD-10-CM

## 2019-08-30 DIAGNOSIS — R22.31 LOCALIZED SWELLING ON RIGHT HAND: ICD-10-CM

## 2019-08-30 DIAGNOSIS — N18.30 CKD (CHRONIC KIDNEY DISEASE) STAGE 3, GFR 30-59 ML/MIN (HCC): ICD-10-CM

## 2019-08-30 DIAGNOSIS — M79.642 LEFT HAND PAIN: ICD-10-CM

## 2019-08-30 LAB
ALBUMIN SERPL-MCNC: 4.2 G/DL (ref 3.4–5)
ANION GAP SERPL CALCULATED.3IONS-SCNC: 16 MMOL/L (ref 3–16)
BUN BLDV-MCNC: 45 MG/DL (ref 7–20)
CALCIUM SERPL-MCNC: 9.1 MG/DL (ref 8.3–10.6)
CHLORIDE BLD-SCNC: 103 MMOL/L (ref 99–110)
CO2: 24 MMOL/L (ref 21–32)
CREAT SERPL-MCNC: 1.6 MG/DL (ref 0.6–1.2)
GFR AFRICAN AMERICAN: 38
GFR NON-AFRICAN AMERICAN: 31
GLUCOSE BLD-MCNC: 120 MG/DL (ref 70–99)
PHOSPHORUS: 4.6 MG/DL (ref 2.5–4.9)
POTASSIUM SERPL-SCNC: 4.8 MMOL/L (ref 3.5–5.1)
SODIUM BLD-SCNC: 143 MMOL/L (ref 136–145)

## 2019-08-30 PROCEDURE — G8598 ASA/ANTIPLAT THER USED: HCPCS | Performed by: INTERNAL MEDICINE

## 2019-08-30 PROCEDURE — 73130 X-RAY EXAM OF HAND: CPT

## 2019-08-30 PROCEDURE — 99214 OFFICE O/P EST MOD 30 MIN: CPT | Performed by: INTERNAL MEDICINE

## 2019-08-30 PROCEDURE — G8427 DOCREV CUR MEDS BY ELIG CLIN: HCPCS | Performed by: INTERNAL MEDICINE

## 2019-08-30 PROCEDURE — 4040F PNEUMOC VAC/ADMIN/RCVD: CPT | Performed by: INTERNAL MEDICINE

## 2019-08-30 PROCEDURE — 1123F ACP DISCUSS/DSCN MKR DOCD: CPT | Performed by: INTERNAL MEDICINE

## 2019-08-30 PROCEDURE — G8417 CALC BMI ABV UP PARAM F/U: HCPCS | Performed by: INTERNAL MEDICINE

## 2019-08-30 PROCEDURE — 1090F PRES/ABSN URINE INCON ASSESS: CPT | Performed by: INTERNAL MEDICINE

## 2019-08-30 PROCEDURE — G8400 PT W/DXA NO RESULTS DOC: HCPCS | Performed by: INTERNAL MEDICINE

## 2019-08-30 PROCEDURE — 1036F TOBACCO NON-USER: CPT | Performed by: INTERNAL MEDICINE

## 2019-08-30 PROCEDURE — 73120 X-RAY EXAM OF HAND: CPT

## 2019-08-30 NOTE — PROGRESS NOTES
tablet by mouth 2 times daily  Levi Kemp MD   lidocaine (XYLOCAINE) 5 % ointment Apply 1 Applicatorful topically as needed for Pain Apply topically as needed. Historical Provider, MD   ipratropium-albuterol (DUONEB) 0.5-2.5 (3) MG/3ML SOLN nebulizer solution Inhale 3 mLs into the lungs every 6 hours as needed for Shortness of Breath DX: J45.40  BIJAN De La Torre   budesonide (PULMICORT FLEXHALER) 180 MCG/ACT AEPB inhaler Inhale 2 puffs into the lungs 2 times daily  BIJAN De La Torre   carbidopa-levodopa (SINEMET)  MG per tablet TAKE 1 TABLET BY MOUTH 3 TIMES A DAY  Lacey Martino MD   albuterol sulfate HFA (VENTOLIN HFA) 108 (90 Base) MCG/ACT inhaler Inhale 2 puffs into the lungs every 6 hours as needed for Wheezing  Lacey Martino MD   salmeterol (SEREVENT) 50 MCG/DOSE diskus inhaler Inhale 1 puff by mouth 2 times a day. Danielle Buchanan MD   lamoTRIgine (LAMICTAL) 25 MG tablet Take 50 mg by mouth 2 times daily   Historical Provider, MD   Multiple Vitamins-Minerals (THERAPEUTIC MULTIVITAMIN-MINERALS) tablet Take 1 tablet by mouth daily  Historical Provider, MD   Alpha-Lipoic Acid 300 MG CAPS Take 300 mg by mouth daily  Historical Provider, MD   Handicap Placard MISC by Does not apply route. 12/12/14-12/12/19  Danielle Buchanan MD   solifenacin (VESICARE) 5 MG tablet Take 1 tablet by mouth daily. Danielle Buchanan MD   aspirin EC 81 MG EC tablet Take 1 tablet by mouth daily. Start taking next week.   Indications: OTC  Drake Cortes MD        Social History     Tobacco Use    Smoking status: Never Smoker    Smokeless tobacco: Never Used   Substance Use Topics    Alcohol use: No     Alcohol/week: 0.0 standard drinks        Vitals:    08/30/19 1427   BP: 138/84   Site: Left Lower Arm   Position: Sitting   Cuff Size: Small Adult   SpO2: 97%   Weight: 257 lb 3.2 oz (116.7 kg)  Comment: weight was given from PT     Estimated body mass index is 41.51 kg/m² as calculated from the following:

## 2019-09-03 ENCOUNTER — CARE COORDINATION (OUTPATIENT)
Dept: CARE COORDINATION | Age: 79
End: 2019-09-03

## 2019-09-26 ENCOUNTER — CARE COORDINATION (OUTPATIENT)
Dept: CARE COORDINATION | Age: 79
End: 2019-09-26

## 2019-09-26 NOTE — CARE COORDINATION
Future Appointments   Date Time Provider Katy Ade   9/27/2019  9:20 AM BIJAN Aaron [facility]   10/1/2019  9:45 AM [facility] RM 3 [facility] Cris Gill   10/18/2019  1:45 PM Alpa Yoo MD [facility]   11/13/2019 10:30 AM MAHAD Loaiza - CNP [facility]   1/27/2020  1:45 PM MD Ren Calderon [facility]   4/6/2020 10:00 AM SCHEDULE, MHCX [facility] AWV LPN [facility]     ,   COPD Assessment    Does the patient understand envrionmental exposure?:  Yes  Is the patient able to verbalize Rescue vs. Long Acting medications?:  Yes  Does the patient have a nebulizer?:  Yes  Does the patient use a space with inhaled medications?:  Yes            Symptoms:   COPD associated wheezing: Pos      Symptom course:  worsening      and   General Assessment    Do you have any symptoms that are causing concern?:  Yes  Progression since Onset:  Gradually Worsening  Reported Symptoms:  Other (Comment: wheezing)

## 2019-09-27 ENCOUNTER — OFFICE VISIT (OUTPATIENT)
Dept: FAMILY MEDICINE CLINIC | Age: 79
End: 2019-09-27
Payer: MEDICARE

## 2019-09-27 ENCOUNTER — CARE COORDINATION (OUTPATIENT)
Dept: CARE COORDINATION | Age: 79
End: 2019-09-27

## 2019-09-27 VITALS
OXYGEN SATURATION: 93 % | TEMPERATURE: 98.2 F | RESPIRATION RATE: 18 BRPM | HEART RATE: 59 BPM | BODY MASS INDEX: 40.27 KG/M2 | HEIGHT: 66 IN | DIASTOLIC BLOOD PRESSURE: 44 MMHG | SYSTOLIC BLOOD PRESSURE: 110 MMHG | WEIGHT: 250.6 LBS

## 2019-09-27 DIAGNOSIS — J44.1 COPD EXACERBATION (HCC): Primary | ICD-10-CM

## 2019-09-27 DIAGNOSIS — Z23 NEED FOR INFLUENZA VACCINATION: ICD-10-CM

## 2019-09-27 DIAGNOSIS — Z99.2 STAGE 5 CHRONIC KIDNEY DISEASE ON CHRONIC DIALYSIS (HCC): ICD-10-CM

## 2019-09-27 DIAGNOSIS — G20 PARKINSON DISEASE (HCC): ICD-10-CM

## 2019-09-27 DIAGNOSIS — N18.6 STAGE 5 CHRONIC KIDNEY DISEASE ON CHRONIC DIALYSIS (HCC): ICD-10-CM

## 2019-09-27 PROCEDURE — 1036F TOBACCO NON-USER: CPT | Performed by: PHYSICIAN ASSISTANT

## 2019-09-27 PROCEDURE — G0008 ADMIN INFLUENZA VIRUS VAC: HCPCS | Performed by: PHYSICIAN ASSISTANT

## 2019-09-27 PROCEDURE — G8926 SPIRO NO PERF OR DOC: HCPCS | Performed by: PHYSICIAN ASSISTANT

## 2019-09-27 PROCEDURE — G8598 ASA/ANTIPLAT THER USED: HCPCS | Performed by: PHYSICIAN ASSISTANT

## 2019-09-27 PROCEDURE — G8400 PT W/DXA NO RESULTS DOC: HCPCS | Performed by: PHYSICIAN ASSISTANT

## 2019-09-27 PROCEDURE — G8427 DOCREV CUR MEDS BY ELIG CLIN: HCPCS | Performed by: PHYSICIAN ASSISTANT

## 2019-09-27 PROCEDURE — 1123F ACP DISCUSS/DSCN MKR DOCD: CPT | Performed by: PHYSICIAN ASSISTANT

## 2019-09-27 PROCEDURE — 90653 IIV ADJUVANT VACCINE IM: CPT | Performed by: PHYSICIAN ASSISTANT

## 2019-09-27 PROCEDURE — 4040F PNEUMOC VAC/ADMIN/RCVD: CPT | Performed by: PHYSICIAN ASSISTANT

## 2019-09-27 PROCEDURE — 3023F SPIROM DOC REV: CPT | Performed by: PHYSICIAN ASSISTANT

## 2019-09-27 PROCEDURE — G8417 CALC BMI ABV UP PARAM F/U: HCPCS | Performed by: PHYSICIAN ASSISTANT

## 2019-09-27 PROCEDURE — 1090F PRES/ABSN URINE INCON ASSESS: CPT | Performed by: PHYSICIAN ASSISTANT

## 2019-09-27 PROCEDURE — 99214 OFFICE O/P EST MOD 30 MIN: CPT | Performed by: PHYSICIAN ASSISTANT

## 2019-09-27 RX ORDER — BENZONATATE 100 MG/1
100 CAPSULE ORAL 2 TIMES DAILY PRN
Qty: 20 CAPSULE | Refills: 0 | Status: SHIPPED | OUTPATIENT
Start: 2019-09-27 | End: 2019-10-04

## 2019-09-27 RX ORDER — DOXYCYCLINE HYCLATE 100 MG/1
100 CAPSULE ORAL 2 TIMES DAILY
Qty: 28 CAPSULE | Refills: 0 | Status: SHIPPED | OUTPATIENT
Start: 2019-09-27 | End: 2019-10-11

## 2019-09-27 ASSESSMENT — ENCOUNTER SYMPTOMS
NAUSEA: 0
SHORTNESS OF BREATH: 1
ABDOMINAL PAIN: 0
DIARRHEA: 0
RHINORRHEA: 0
CONSTIPATION: 0
WHEEZING: 1
SORE THROAT: 1
COUGH: 1
VOMITING: 0

## 2019-09-27 NOTE — PROGRESS NOTES
times daily 180 tablet 1    ELIQUIS 2.5 MG TABS tablet Take 1 tablet by mouth 2 times daily 180 tablet 1    atorvastatin (LIPITOR) 40 MG tablet Take 1 and 1/2 tablets by mouth every evening 135 tablet 1    DULoxetine (CYMBALTA) 60 MG extended release capsule Take 1 capsule by mouth daily 90 capsule 1    montelukast (SINGULAIR) 10 MG tablet Take 1 tablet by mouth daily. 90 tablet 1    furosemide (LASIX) 40 MG tablet Take 1 tablet by mouth daily (Patient taking differently: Take 40 mg by mouth See Admin Instructions Indications: TAKING 1/2 EVERY DAY ) 90 tablet 1    fluticasone (FLONASE) 50 MCG/ACT nasal spray USE 2 PUFFS IN EACH NOSTRIL DAILY 1 Bottle 11    NIFEdipine (ADALAT CC) 60 MG extended release tablet Take 1 tablet by mouth daily 90 tablet 1    spironolactone (ALDACTONE) 25 MG tablet Take 1 tablet by mouth daily 90 tablet 1    guaiFENesin (MUCINEX) 600 MG extended release tablet Take 1 tablet by mouth 2 times daily 6 tablet 0    lidocaine (XYLOCAINE) 5 % ointment Apply 1 Applicatorful topically as needed for Pain Apply topically as needed.  ipratropium-albuterol (DUONEB) 0.5-2.5 (3) MG/3ML SOLN nebulizer solution Inhale 3 mLs into the lungs every 6 hours as needed for Shortness of Breath DX: J45.40 360 mL 1    budesonide (PULMICORT FLEXHALER) 180 MCG/ACT AEPB inhaler Inhale 2 puffs into the lungs 2 times daily 3 each 1    carbidopa-levodopa (SINEMET)  MG per tablet TAKE 1 TABLET BY MOUTH 3 TIMES A DAY 90 tablet 1    albuterol sulfate HFA (VENTOLIN HFA) 108 (90 Base) MCG/ACT inhaler Inhale 2 puffs into the lungs every 6 hours as needed for Wheezing 3 Inhaler 1    salmeterol (SEREVENT) 50 MCG/DOSE diskus inhaler Inhale 1 puff by mouth 2 times a day.  180 each 1    lamoTRIgine (LAMICTAL) 25 MG tablet Take 50 mg by mouth 2 times daily       Multiple Vitamins-Minerals (THERAPEUTIC MULTIVITAMIN-MINERALS) tablet Take 1 tablet by mouth daily      Alpha-Lipoic Acid 300 MG CAPS Take 300 mg by Need for influenza vaccination  -     INFLUENZA, TRIV, INACTIVATED, SUBUNIT, ADJUVANTED, 65 YRS AND OLDER, IM, PREFILL SYR, 0.5ML (FLUAD TRIV)    Parkinson disease (HCC)  -     Handicap Placard MISC; by Does not apply route DX: G20  parkinson's disease  Exp: 9/1/2024      Return if symptoms worsen or fail to improve.

## 2019-09-30 ENCOUNTER — CARE COORDINATION (OUTPATIENT)
Dept: CARE COORDINATION | Age: 79
End: 2019-09-30

## 2019-09-30 ASSESSMENT — ENCOUNTER SYMPTOMS
SHORTNESS OF BREATH: 0
COUGH: 0

## 2019-10-01 ENCOUNTER — HOSPITAL ENCOUNTER (OUTPATIENT)
Dept: WOMENS IMAGING | Age: 79
Discharge: HOME | End: 2019-10-01
Payer: MEDICARE

## 2019-10-01 DIAGNOSIS — Z12.31 ENCOUNTER FOR SCREENING MAMMOGRAM FOR BREAST CANCER: ICD-10-CM

## 2019-10-01 PROCEDURE — 77067 SCR MAMMO BI INCL CAD: CPT

## 2019-10-07 ENCOUNTER — CARE COORDINATION (OUTPATIENT)
Dept: CARE COORDINATION | Age: 79
End: 2019-10-07

## 2019-10-10 ENCOUNTER — CARE COORDINATION (OUTPATIENT)
Dept: CARE COORDINATION | Age: 79
End: 2019-10-10

## 2019-10-18 ENCOUNTER — OFFICE VISIT (OUTPATIENT)
Dept: FAMILY MEDICINE CLINIC | Age: 79
End: 2019-10-18
Payer: MEDICARE

## 2019-10-18 VITALS
DIASTOLIC BLOOD PRESSURE: 70 MMHG | HEART RATE: 71 BPM | BODY MASS INDEX: 41.66 KG/M2 | WEIGHT: 259.2 LBS | SYSTOLIC BLOOD PRESSURE: 122 MMHG | OXYGEN SATURATION: 92 % | TEMPERATURE: 97.3 F | HEIGHT: 66 IN

## 2019-10-18 DIAGNOSIS — E11.69 TYPE 2 DIABETES MELLITUS WITH OTHER SPECIFIED COMPLICATION, WITHOUT LONG-TERM CURRENT USE OF INSULIN (HCC): Primary | ICD-10-CM

## 2019-10-18 DIAGNOSIS — J45.40 MODERATE PERSISTENT ASTHMA WITHOUT COMPLICATION: ICD-10-CM

## 2019-10-18 DIAGNOSIS — E03.9 ACQUIRED HYPOTHYROIDISM: ICD-10-CM

## 2019-10-18 DIAGNOSIS — R68.84 JAW PAIN: ICD-10-CM

## 2019-10-18 LAB
ALBUMIN SERPL-MCNC: 4.9 G/DL (ref 3.4–5)
ANION GAP SERPL CALCULATED.3IONS-SCNC: 17 MMOL/L (ref 3–16)
BASOPHILS ABSOLUTE: 0 K/UL (ref 0–0.2)
BASOPHILS RELATIVE PERCENT: 0.4 %
BUN BLDV-MCNC: 41 MG/DL (ref 7–20)
CALCIUM SERPL-MCNC: 9.5 MG/DL (ref 8.3–10.6)
CHLORIDE BLD-SCNC: 97 MMOL/L (ref 99–110)
CO2: 27 MMOL/L (ref 21–32)
CREAT SERPL-MCNC: 1.8 MG/DL (ref 0.6–1.2)
EOSINOPHILS ABSOLUTE: 0.1 K/UL (ref 0–0.6)
EOSINOPHILS RELATIVE PERCENT: 1 %
GFR AFRICAN AMERICAN: 33
GFR NON-AFRICAN AMERICAN: 27
GLUCOSE BLD-MCNC: 150 MG/DL (ref 70–99)
HBA1C MFR BLD: 6.3 %
HCT VFR BLD CALC: 35.6 % (ref 36–48)
HEMOGLOBIN: 11.5 G/DL (ref 12–16)
LYMPHOCYTES ABSOLUTE: 1.6 K/UL (ref 1–5.1)
LYMPHOCYTES RELATIVE PERCENT: 14.1 %
MCH RBC QN AUTO: 28 PG (ref 26–34)
MCHC RBC AUTO-ENTMCNC: 32.4 G/DL (ref 31–36)
MCV RBC AUTO: 86.7 FL (ref 80–100)
MONOCYTES ABSOLUTE: 0.6 K/UL (ref 0–1.3)
MONOCYTES RELATIVE PERCENT: 5.2 %
NEUTROPHILS ABSOLUTE: 9.2 K/UL (ref 1.7–7.7)
NEUTROPHILS RELATIVE PERCENT: 79.3 %
PDW BLD-RTO: 14 % (ref 12.4–15.4)
PHOSPHORUS: 4.8 MG/DL (ref 2.5–4.9)
PLATELET # BLD: 327 K/UL (ref 135–450)
PMV BLD AUTO: 9.2 FL (ref 5–10.5)
POTASSIUM SERPL-SCNC: 4.5 MMOL/L (ref 3.5–5.1)
RBC # BLD: 4.11 M/UL (ref 4–5.2)
SODIUM BLD-SCNC: 141 MMOL/L (ref 136–145)
WBC # BLD: 11.6 K/UL (ref 4–11)

## 2019-10-18 PROCEDURE — G8482 FLU IMMUNIZE ORDER/ADMIN: HCPCS | Performed by: INTERNAL MEDICINE

## 2019-10-18 PROCEDURE — G8427 DOCREV CUR MEDS BY ELIG CLIN: HCPCS | Performed by: INTERNAL MEDICINE

## 2019-10-18 PROCEDURE — 1123F ACP DISCUSS/DSCN MKR DOCD: CPT | Performed by: INTERNAL MEDICINE

## 2019-10-18 PROCEDURE — 1036F TOBACCO NON-USER: CPT | Performed by: INTERNAL MEDICINE

## 2019-10-18 PROCEDURE — 4040F PNEUMOC VAC/ADMIN/RCVD: CPT | Performed by: INTERNAL MEDICINE

## 2019-10-18 PROCEDURE — G8598 ASA/ANTIPLAT THER USED: HCPCS | Performed by: INTERNAL MEDICINE

## 2019-10-18 PROCEDURE — 99214 OFFICE O/P EST MOD 30 MIN: CPT | Performed by: INTERNAL MEDICINE

## 2019-10-18 PROCEDURE — 1090F PRES/ABSN URINE INCON ASSESS: CPT | Performed by: INTERNAL MEDICINE

## 2019-10-18 PROCEDURE — 83036 HEMOGLOBIN GLYCOSYLATED A1C: CPT | Performed by: INTERNAL MEDICINE

## 2019-10-18 PROCEDURE — G8417 CALC BMI ABV UP PARAM F/U: HCPCS | Performed by: INTERNAL MEDICINE

## 2019-10-18 PROCEDURE — G8400 PT W/DXA NO RESULTS DOC: HCPCS | Performed by: INTERNAL MEDICINE

## 2019-10-18 RX ORDER — TIZANIDINE 2 MG/1
2 TABLET ORAL NIGHTLY PRN
Qty: 10 TABLET | Refills: 0 | Status: SHIPPED | OUTPATIENT
Start: 2019-10-18 | End: 2019-11-04 | Stop reason: SDUPTHER

## 2019-10-18 RX ORDER — AMOXICILLIN 500 MG/1
500 CAPSULE ORAL 2 TIMES DAILY
Qty: 20 CAPSULE | Refills: 0 | Status: SHIPPED | OUTPATIENT
Start: 2019-10-18 | End: 2019-10-28

## 2019-10-23 ENCOUNTER — CARE COORDINATION (OUTPATIENT)
Dept: CARE COORDINATION | Age: 79
End: 2019-10-23

## 2019-11-01 ENCOUNTER — CARE COORDINATION (OUTPATIENT)
Dept: CARE COORDINATION | Age: 79
End: 2019-11-01

## 2019-11-04 RX ORDER — TIZANIDINE 2 MG/1
TABLET ORAL
Qty: 60 TABLET | Refills: 3 | Status: SHIPPED | OUTPATIENT
Start: 2019-11-04 | End: 2019-11-05 | Stop reason: SDUPTHER

## 2019-11-11 ENCOUNTER — CARE COORDINATION (OUTPATIENT)
Dept: CARE COORDINATION | Age: 79
End: 2019-11-11

## 2019-11-14 DIAGNOSIS — I50.32 CHRONIC DIASTOLIC CONGESTIVE HEART FAILURE (HCC): ICD-10-CM

## 2019-11-14 RX ORDER — SPIRONOLACTONE 25 MG/1
25 TABLET ORAL DAILY
Qty: 90 TABLET | Refills: 1 | Status: ON HOLD
Start: 2019-11-14 | End: 2020-03-20 | Stop reason: HOSPADM

## 2019-11-14 RX ORDER — PROPAFENONE HYDROCHLORIDE 150 MG/1
150 TABLET, FILM COATED ORAL EVERY 8 HOURS SCHEDULED
Qty: 90 TABLET | Refills: 1 | Status: SHIPPED | OUTPATIENT
Start: 2019-11-14 | End: 2020-01-16 | Stop reason: SDUPTHER

## 2019-11-14 RX ORDER — NIFEDIPINE 60 MG/1
60 TABLET, FILM COATED, EXTENDED RELEASE ORAL DAILY
Qty: 90 TABLET | Refills: 1 | Status: SHIPPED | OUTPATIENT
Start: 2019-11-14 | End: 2020-01-27 | Stop reason: SDUPTHER

## 2019-11-14 RX ORDER — FUROSEMIDE 40 MG/1
40 TABLET ORAL DAILY
Qty: 90 TABLET | Refills: 1 | Status: SHIPPED | OUTPATIENT
Start: 2019-11-14 | End: 2020-01-27 | Stop reason: SDUPTHER

## 2019-11-15 ENCOUNTER — TELEPHONE (OUTPATIENT)
Dept: PULMONOLOGY | Age: 79
End: 2019-11-15

## 2019-11-27 ENCOUNTER — CARE COORDINATION (OUTPATIENT)
Dept: CARE COORDINATION | Age: 79
End: 2019-11-27

## 2019-11-27 RX ORDER — TIZANIDINE 2 MG/1
TABLET ORAL
Qty: 60 TABLET | Refills: 1 | OUTPATIENT
Start: 2019-11-27

## 2019-12-12 ENCOUNTER — OFFICE VISIT (OUTPATIENT)
Dept: PULMONOLOGY | Age: 79
End: 2019-12-12
Payer: MEDICARE

## 2019-12-12 VITALS
HEART RATE: 62 BPM | WEIGHT: 263 LBS | DIASTOLIC BLOOD PRESSURE: 62 MMHG | RESPIRATION RATE: 16 BRPM | OXYGEN SATURATION: 94 % | SYSTOLIC BLOOD PRESSURE: 143 MMHG | BODY MASS INDEX: 42.27 KG/M2 | TEMPERATURE: 98.2 F | HEIGHT: 66 IN

## 2019-12-12 DIAGNOSIS — G47.33 SEVERE OBSTRUCTIVE SLEEP APNEA: Primary | ICD-10-CM

## 2019-12-12 DIAGNOSIS — E66.01 MORBID OBESITY WITH BMI OF 40.0-44.9, ADULT (HCC): Chronic | ICD-10-CM

## 2019-12-12 DIAGNOSIS — Z71.89 ENCOUNTER FOR BIPAP USE COUNSELING: ICD-10-CM

## 2019-12-12 PROCEDURE — 99213 OFFICE O/P EST LOW 20 MIN: CPT | Performed by: NURSE PRACTITIONER

## 2019-12-12 PROCEDURE — G8400 PT W/DXA NO RESULTS DOC: HCPCS | Performed by: NURSE PRACTITIONER

## 2019-12-12 PROCEDURE — G8417 CALC BMI ABV UP PARAM F/U: HCPCS | Performed by: NURSE PRACTITIONER

## 2019-12-12 PROCEDURE — G8598 ASA/ANTIPLAT THER USED: HCPCS | Performed by: NURSE PRACTITIONER

## 2019-12-12 PROCEDURE — G8482 FLU IMMUNIZE ORDER/ADMIN: HCPCS | Performed by: NURSE PRACTITIONER

## 2019-12-12 PROCEDURE — 1036F TOBACCO NON-USER: CPT | Performed by: NURSE PRACTITIONER

## 2019-12-12 PROCEDURE — 4040F PNEUMOC VAC/ADMIN/RCVD: CPT | Performed by: NURSE PRACTITIONER

## 2019-12-12 PROCEDURE — G8427 DOCREV CUR MEDS BY ELIG CLIN: HCPCS | Performed by: NURSE PRACTITIONER

## 2019-12-12 PROCEDURE — 1123F ACP DISCUSS/DSCN MKR DOCD: CPT | Performed by: NURSE PRACTITIONER

## 2019-12-12 PROCEDURE — 1090F PRES/ABSN URINE INCON ASSESS: CPT | Performed by: NURSE PRACTITIONER

## 2019-12-12 ASSESSMENT — SLEEP AND FATIGUE QUESTIONNAIRES
HOW LIKELY ARE YOU TO NOD OFF OR FALL ASLEEP IN A CAR, WHILE STOPPED FOR A FEW MINUTES IN TRAFFIC: 0
HOW LIKELY ARE YOU TO NOD OFF OR FALL ASLEEP WHILE LYING DOWN TO REST IN THE AFTERNOON WHEN CIRCUMSTANCES PERMIT: 1
NECK CIRCUMFERENCE (INCHES): 16.5
ESS TOTAL SCORE: 6
HOW LIKELY ARE YOU TO NOD OFF OR FALL ASLEEP WHILE SITTING AND TALKING TO SOMEONE: 0
HOW LIKELY ARE YOU TO NOD OFF OR FALL ASLEEP WHILE SITTING INACTIVE IN A PUBLIC PLACE: 1
HOW LIKELY ARE YOU TO NOD OFF OR FALL ASLEEP WHILE SITTING AND READING: 1
HOW LIKELY ARE YOU TO NOD OFF OR FALL ASLEEP WHILE WATCHING TV: 1
HOW LIKELY ARE YOU TO NOD OFF OR FALL ASLEEP WHILE SITTING QUIETLY AFTER LUNCH WITHOUT ALCOHOL: 1
HOW LIKELY ARE YOU TO NOD OFF OR FALL ASLEEP WHEN YOU ARE A PASSENGER IN A CAR FOR AN HOUR WITHOUT A BREAK: 1

## 2019-12-19 ENCOUNTER — CARE COORDINATION (OUTPATIENT)
Dept: CARE COORDINATION | Age: 79
End: 2019-12-19

## 2019-12-26 RX ORDER — TIZANIDINE 2 MG/1
TABLET ORAL
Qty: 60 TABLET | Refills: 1 | Status: SHIPPED | OUTPATIENT
Start: 2019-12-26 | End: 2020-01-20

## 2019-12-30 ENCOUNTER — CARE COORDINATION (OUTPATIENT)
Dept: CARE COORDINATION | Age: 79
End: 2019-12-30

## 2020-01-15 RX ORDER — DULOXETIN HYDROCHLORIDE 60 MG/1
60 CAPSULE, DELAYED RELEASE ORAL DAILY
Qty: 90 CAPSULE | Refills: 1 | Status: SHIPPED | OUTPATIENT
Start: 2020-01-15 | End: 2020-07-13

## 2020-01-15 NOTE — TELEPHONE ENCOUNTER
Refill Request     Last Seen: 10/18/2019    Last Written: 7/15/19 with 1 refill     Next Appointment:   Future Appointments   Date Time Provider Katy Ade   1/24/2020 11:00 AM Helga Martin MD [facility]   1/27/2020  1:45 PM Bal Shin MD [facility]   4/6/2020 10:00 AM SCHEDULE, [facility] AWV LPN [facility]   11/25/2020  9:30 AM MAHAD De La Cruz - CNP [facility]     Requested Prescriptions     Pending Prescriptions Disp Refills    DULoxetine (CYMBALTA) 60 MG extended release capsule [Pharmacy Med Name: Kiki 57 DULOXETINE 60 MG DR CAP (Bottle, 1000.0 Capsules)] 90 capsule 0     Sig: Take 1 capsule by mouth daily

## 2020-01-16 RX ORDER — PROPAFENONE HYDROCHLORIDE 150 MG/1
150 TABLET, FILM COATED ORAL EVERY 8 HOURS SCHEDULED
Qty: 90 TABLET | Refills: 3 | Status: SHIPPED | OUTPATIENT
Start: 2020-01-16 | End: 2020-01-27 | Stop reason: SDUPTHER

## 2020-01-20 RX ORDER — TIZANIDINE 2 MG/1
TABLET ORAL
Qty: 60 TABLET | Refills: 1 | Status: ON HOLD | OUTPATIENT
Start: 2020-01-20 | End: 2020-03-20 | Stop reason: HOSPADM

## 2020-01-20 NOTE — TELEPHONE ENCOUNTER
Refill Request     Last Seen: 10/18/2019    Last Written: 12/26/19 with 1 refill    Next Appointment:   Future Appointments   Date Time Provider Katy Booker   1/24/2020 11:00 AM Violetta Masters MD [facility]   1/27/2020  1:45 PM Devonda Goodpasture, MD Carlye Plumber [facility]   4/6/2020 10:00 AM SCHEDULE, [facility]X [facility] AWV LPN [facility]   11/25/2020  9:30 AM MAHAD Piedra - CNP [facility]     Requested Prescriptions     Pending Prescriptions Disp Refills    tiZANidine (ZANAFLEX) 2 MG tablet [Pharmacy Med Name: TIZANIDINE HCL 2 MG TABLET] 60 tablet 1     Sig: TAKE 1-2 TABLETS NIGHTLY AS NEEDED FOR JAW PAIN.

## 2020-01-24 ENCOUNTER — OFFICE VISIT (OUTPATIENT)
Dept: FAMILY MEDICINE CLINIC | Age: 80
End: 2020-01-24
Payer: MEDICARE

## 2020-01-24 VITALS
DIASTOLIC BLOOD PRESSURE: 84 MMHG | BODY MASS INDEX: 41.45 KG/M2 | HEART RATE: 66 BPM | SYSTOLIC BLOOD PRESSURE: 148 MMHG | WEIGHT: 256.8 LBS | OXYGEN SATURATION: 94 %

## 2020-01-24 DIAGNOSIS — E11.22 TYPE 2 DIABETES MELLITUS WITH STAGE 3 CHRONIC KIDNEY DISEASE, WITHOUT LONG-TERM CURRENT USE OF INSULIN (HCC): ICD-10-CM

## 2020-01-24 DIAGNOSIS — N18.30 TYPE 2 DIABETES MELLITUS WITH STAGE 3 CHRONIC KIDNEY DISEASE, WITHOUT LONG-TERM CURRENT USE OF INSULIN (HCC): ICD-10-CM

## 2020-01-24 PROBLEM — G40.209 PARTIAL SYMPTOMATIC EPILEPSY WITH COMPLEX PARTIAL SEIZURES, NOT INTRACTABLE, WITHOUT STATUS EPILEPTICUS (HCC): Status: ACTIVE | Noted: 2020-01-24

## 2020-01-24 LAB
ALBUMIN SERPL-MCNC: 4.3 G/DL (ref 3.4–5)
ANION GAP SERPL CALCULATED.3IONS-SCNC: 18 MMOL/L (ref 3–16)
BUN BLDV-MCNC: 55 MG/DL (ref 7–20)
CALCIUM SERPL-MCNC: 9.4 MG/DL (ref 8.3–10.6)
CHLORIDE BLD-SCNC: 98 MMOL/L (ref 99–110)
CHOLESTEROL, FASTING: 160 MG/DL (ref 0–199)
CO2: 24 MMOL/L (ref 21–32)
CREAT SERPL-MCNC: 1.9 MG/DL (ref 0.6–1.2)
GFR AFRICAN AMERICAN: 31
GFR NON-AFRICAN AMERICAN: 26
GLUCOSE BLD-MCNC: 159 MG/DL (ref 70–99)
HBA1C MFR BLD: 6.4 %
HDLC SERPL-MCNC: 54 MG/DL (ref 40–60)
LDL CHOLESTEROL CALCULATED: 65 MG/DL
PHOSPHORUS: 5.9 MG/DL (ref 2.5–4.9)
POTASSIUM SERPL-SCNC: 4.6 MMOL/L (ref 3.5–5.1)
SODIUM BLD-SCNC: 140 MMOL/L (ref 136–145)
TRIGLYCERIDE, FASTING: 206 MG/DL (ref 0–150)
TSH REFLEX: 2.87 UIU/ML (ref 0.27–4.2)
VLDLC SERPL CALC-MCNC: 41 MG/DL

## 2020-01-24 PROCEDURE — 1123F ACP DISCUSS/DSCN MKR DOCD: CPT | Performed by: INTERNAL MEDICINE

## 2020-01-24 PROCEDURE — 99214 OFFICE O/P EST MOD 30 MIN: CPT | Performed by: INTERNAL MEDICINE

## 2020-01-24 PROCEDURE — G8482 FLU IMMUNIZE ORDER/ADMIN: HCPCS | Performed by: INTERNAL MEDICINE

## 2020-01-24 PROCEDURE — G8417 CALC BMI ABV UP PARAM F/U: HCPCS | Performed by: INTERNAL MEDICINE

## 2020-01-24 PROCEDURE — G8428 CUR MEDS NOT DOCUMENT: HCPCS | Performed by: INTERNAL MEDICINE

## 2020-01-24 PROCEDURE — G8400 PT W/DXA NO RESULTS DOC: HCPCS | Performed by: INTERNAL MEDICINE

## 2020-01-24 PROCEDURE — 83036 HEMOGLOBIN GLYCOSYLATED A1C: CPT | Performed by: INTERNAL MEDICINE

## 2020-01-24 PROCEDURE — 1090F PRES/ABSN URINE INCON ASSESS: CPT | Performed by: INTERNAL MEDICINE

## 2020-01-24 PROCEDURE — 4040F PNEUMOC VAC/ADMIN/RCVD: CPT | Performed by: INTERNAL MEDICINE

## 2020-01-24 PROCEDURE — 1036F TOBACCO NON-USER: CPT | Performed by: INTERNAL MEDICINE

## 2020-01-24 RX ORDER — MONTELUKAST SODIUM 10 MG/1
TABLET ORAL
Qty: 90 TABLET | Refills: 1 | Status: SHIPPED | OUTPATIENT
Start: 2020-01-24 | End: 2020-07-13

## 2020-01-24 RX ORDER — METOPROLOL TARTRATE 50 MG/1
50 TABLET, FILM COATED ORAL 2 TIMES DAILY
Qty: 180 TABLET | Refills: 1 | Status: SHIPPED | OUTPATIENT
Start: 2020-01-24 | End: 2020-08-07

## 2020-01-24 RX ORDER — ATORVASTATIN CALCIUM 40 MG/1
TABLET, FILM COATED ORAL
Qty: 135 TABLET | Refills: 1 | Status: SHIPPED | OUTPATIENT
Start: 2020-01-24 | End: 2020-01-27 | Stop reason: SDUPTHER

## 2020-01-24 ASSESSMENT — ENCOUNTER SYMPTOMS
SHORTNESS OF BREATH: 0
COUGH: 0

## 2020-01-24 ASSESSMENT — PATIENT HEALTH QUESTIONNAIRE - PHQ9
SUM OF ALL RESPONSES TO PHQ QUESTIONS 1-9: 0
1. LITTLE INTEREST OR PLEASURE IN DOING THINGS: 0
SUM OF ALL RESPONSES TO PHQ9 QUESTIONS 1 & 2: 0
SUM OF ALL RESPONSES TO PHQ QUESTIONS 1-9: 0
2. FEELING DOWN, DEPRESSED OR HOPELESS: 0

## 2020-01-24 NOTE — PROGRESS NOTES
SUBJECTIVE:  CC: Diabetes  Chief Complaint   Patient presents with    Diabetes     not on any medication       HPI:  Yudy Padilla presents for follow up and evaluation of diabetes. Also follow up on   Problem List Items Addressed This Visit     Moderate persistent asthma (Chronic)    Relevant Medications    montelukast (SINGULAIR) 10 MG tablet    budesonide (PULMICORT FLEXHALER) 180 MCG/ACT AEPB inhaler    Morbid obesity with BMI of 40.0-44.9, adult (HCC) (Chronic)    Severe obstructive sleep apnea (Chronic)    Hypothyroidism (Chronic)    Relevant Orders    TSH with Reflex    Type 2 diabetes mellitus with stage 3 chronic kidney disease, without long-term current use of insulin (HCC) - Primary    Relevant Orders    Renal Function Panel    Lipid, Fasting    POCT glycosylated hemoglobin (Hb A1C) (Completed)    Paroxysmal atrial fibrillation (HCC)    Relevant Medications    metoprolol tartrate (LOPRESSOR) 50 MG tablet    apixaban (ELIQUIS) 2.5 MG TABS tablet    atorvastatin (LIPITOR) 40 MG tablet    Partial symptomatic epilepsy with complex partial seizures, not intractable, without status epilepticus ([facility] [address]      Other Visit Diagnoses     Parkinson disease ([facility] [address]        Chronic diastolic heart failure (HCC)        Relevant Medications    metoprolol tartrate (LOPRESSOR) 50 MG tablet    apixaban (ELIQUIS) 2.5 MG TABS tablet    atorvastatin (LIPITOR) 40 MG tablet    Neck strain, initial encounter        Relevant Orders    [facility]       feels all stable and controlled on current. No seizures. Home blood glucose log brought to visit: Yes. Control is Good per home record. Upcoming tooth extraction. Had some bleeding in her mouth yesterday. Kandy Brown getting out of the shower one month ago. Squad got her up, didn;t go to hospital, now with ongoing neck pain, worse with turning to the right. she has No symptoms of hypoglycemia. she has No symptoms of hyperglycemia.     The patient denied polyphagia, polydipsia, or polyuria. The last HBA1C and routine lab was   Lab Results   Component Value Date    LABA1C 6.3 10/18/2019     Lab Results   Component Value Date    .1 12/10/2018     Lab Results   Component Value Date    WBC 11.6 (H) 10/18/2019    HGB 11.5 (L) 10/18/2019    HCT 35.6 (L) 10/18/2019     10/18/2019    CHOL 153 02/04/2019    TRIG 106 02/04/2019    HDL 66 (H) 02/04/2019    ALT 9 (L) 05/22/2019    AST 14 (L) 05/22/2019     10/18/2019    K 4.5 10/18/2019    CL 97 (L) 10/18/2019    CREATININE 1.8 (H) 10/18/2019    BUN 41 (H) 10/18/2019    CO2 27 10/18/2019    TSH 1.41 09/26/2018    INR 1.27 (H) 12/10/2018    LABA1C 6.3 10/18/2019    LABMICR YES 05/22/2019       Has been taking meds as ordered Yes.  she has no side effects from the current diabetes medications. Review of Systems   Constitutional: Negative for fatigue. Respiratory: Negative for cough and shortness of breath. Cardiovascular: Negative for chest pain and leg swelling. Neurological: Negative for dizziness and headaches. OBJECTIVE:    VS: Wt 256 lb 12.8 oz (116.5 kg) Comment: taken at home  BMI 41.45 kg/m²   General appearance: Alert, Awake, Oriented times 3, no distress  HEENT:PERRL, nose and throat clear  Skin: Warm and dry  Lungs: Lungs clear to auscultation bilaterally. No rhonchi, crackles or wheezes  Heart: S1 S2  Regular rate and rhythm. No rub, murmur or gallop  Neck: limited ROM to the right  Extremities: No edema, Peripheral pulses palpable  Feet:  No lesions, sensation intact to monofilament, Toe nails intact and without signs of fungus    ASSESSMENT/PLAN:  Gabi Weir was seen today for diabetes, hypertension, hyperlipidemia, allergic rhinitis  and depression. Diagnoses and all orders for this visit:    Type 2 diabetes mellitus with stage 3 chronic kidney disease, without long-term current use of insulin (HCC)  -     Renal Function Panel  -     Lipid, Fasting;  Future  -     POCT

## 2020-01-27 ENCOUNTER — OFFICE VISIT (OUTPATIENT)
Dept: CARDIOLOGY CLINIC | Age: 80
End: 2020-01-27
Payer: MEDICARE

## 2020-01-27 ENCOUNTER — CARE COORDINATION (OUTPATIENT)
Dept: CARE COORDINATION | Age: 80
End: 2020-01-27

## 2020-01-27 VITALS
WEIGHT: 262.3 LBS | OXYGEN SATURATION: 94 % | HEART RATE: 72 BPM | HEIGHT: 66 IN | BODY MASS INDEX: 42.16 KG/M2 | DIASTOLIC BLOOD PRESSURE: 64 MMHG | SYSTOLIC BLOOD PRESSURE: 128 MMHG

## 2020-01-27 PROCEDURE — 1123F ACP DISCUSS/DSCN MKR DOCD: CPT | Performed by: INTERNAL MEDICINE

## 2020-01-27 PROCEDURE — G8427 DOCREV CUR MEDS BY ELIG CLIN: HCPCS | Performed by: INTERNAL MEDICINE

## 2020-01-27 PROCEDURE — 4040F PNEUMOC VAC/ADMIN/RCVD: CPT | Performed by: INTERNAL MEDICINE

## 2020-01-27 PROCEDURE — 99214 OFFICE O/P EST MOD 30 MIN: CPT | Performed by: INTERNAL MEDICINE

## 2020-01-27 PROCEDURE — G8482 FLU IMMUNIZE ORDER/ADMIN: HCPCS | Performed by: INTERNAL MEDICINE

## 2020-01-27 PROCEDURE — G8417 CALC BMI ABV UP PARAM F/U: HCPCS | Performed by: INTERNAL MEDICINE

## 2020-01-27 PROCEDURE — G8400 PT W/DXA NO RESULTS DOC: HCPCS | Performed by: INTERNAL MEDICINE

## 2020-01-27 PROCEDURE — 1036F TOBACCO NON-USER: CPT | Performed by: INTERNAL MEDICINE

## 2020-01-27 PROCEDURE — 1090F PRES/ABSN URINE INCON ASSESS: CPT | Performed by: INTERNAL MEDICINE

## 2020-01-27 RX ORDER — ATORVASTATIN CALCIUM 40 MG/1
TABLET, FILM COATED ORAL
Qty: 135 TABLET | Refills: 3 | Status: ON HOLD | OUTPATIENT
Start: 2020-01-27 | End: 2020-03-20 | Stop reason: SDUPTHER

## 2020-01-27 RX ORDER — FUROSEMIDE 40 MG/1
40 TABLET ORAL DAILY
Qty: 90 TABLET | Refills: 3 | Status: ON HOLD
Start: 2020-01-27 | End: 2020-03-20 | Stop reason: HOSPADM

## 2020-01-27 RX ORDER — NIFEDIPINE 60 MG/1
60 TABLET, FILM COATED, EXTENDED RELEASE ORAL DAILY
Qty: 90 TABLET | Refills: 3 | Status: SHIPPED | OUTPATIENT
Start: 2020-01-27 | End: 2021-01-11 | Stop reason: SDUPTHER

## 2020-01-27 RX ORDER — PROPAFENONE HYDROCHLORIDE 150 MG/1
150 TABLET, FILM COATED ORAL EVERY 8 HOURS SCHEDULED
Qty: 270 TABLET | Refills: 3 | Status: SHIPPED | OUTPATIENT
Start: 2020-01-27 | End: 2021-01-11 | Stop reason: SDUPTHER

## 2020-01-27 RX ORDER — HYDRALAZINE HYDROCHLORIDE 25 MG/1
25 TABLET, FILM COATED ORAL 2 TIMES DAILY
Qty: 180 TABLET | Refills: 3 | Status: SHIPPED | OUTPATIENT
Start: 2020-01-27 | End: 2020-12-10

## 2020-01-27 NOTE — LETTER
 CKD (chronic kidney disease) stage 3, GFR 30-59 ml/min (HCC)     Community acquired pneumonia of left lower lobe of lung ([facility] [address] 9/8/2018    CRI     Detached retina, left     Diverticulosis     DJD (degenerative joint disease)     Edema     chronic    Fatty liver     GERD (gastroesophageal reflux disease)     Herniated lumbar intervertebral disc     Hx stage 2 sacral decubitus ulcer 4/13/2018    Hyperlipidemia     Hypertension     Hypothyroid 1/27/2015    Morbid obesity with BMI of 40.0-44.9, adult ([facility] [address] 2/10/2017    BETSY treated with BiPAP     Partial symptomatic epilepsy with complex partial seizures, not intractable, without status epilepticus ([facility] [address] 1/24/2020    Restless leg syndrome     Restless legs syndrome     Sleep apnea     Tremor, essential 8/16/2010    Type 2 diabetes mellitus without complication [facility]     Type II or unspecified type diabetes mellitus without mention of complication, not stated as uncontrolled      Past Surgical History:   Procedure Laterality Date    APPENDECTOMY      CAROTID ENDARTERECTOMY      left    CATARACT REMOVAL      CERVICAL FUSION  11/2010    CHOLECYSTECTOMY      CORNEAL TRANSPLANT      EYE SURGERY      HERNIA REPAIR      HYSTERECTOMY      JOINT REPLACEMENT      KNEE SURGERY      replacement x 2    SHOULDER SURGERY      TONSILLECTOMY      TUBAL LIGATION      VITRECTOMY         Objective:   /64   Pulse 72   Ht 5' 6\" (1.676 m)   Wt 262 lb 4.8 oz (119 kg)   SpO2 94%   BMI 42.34 kg/m²      Wt Readings from Last 3 Encounters:   01/27/20 262 lb 4.8 oz (119 kg)   01/24/20 256 lb 12.8 oz (116.5 kg)   12/12/19 263 lb (119.3 kg)       Physical Exam:  General: No Respiratory distress, appears well developed and well nourished.    Eyes:  Sclera nonicteric  Nose/Sinuses:  negative findings: nose shows no deformity, asymmetry, or inflammation, nasal mucosa normal, septum midline with no perforation or bleeding  Back:  no pain to palpation  lidocaine (XYLOCAINE) 5 % ointment Apply 1 Applicatorful topically as needed for Pain Apply topically as needed.  ipratropium-albuterol (DUONEB) 0.5-2.5 (3) MG/3ML SOLN nebulizer solution Inhale 3 mLs into the lungs every 6 hours as needed for Shortness of Breath DX: J45.40 360 mL 1    carbidopa-levodopa (SINEMET)  MG per tablet TAKE 1 TABLET BY MOUTH 3 TIMES A DAY 90 tablet 1    albuterol sulfate HFA (VENTOLIN HFA) 108 (90 Base) MCG/ACT inhaler Inhale 2 puffs into the lungs every 6 hours as needed for Wheezing 3 Inhaler 1    lamoTRIgine (LAMICTAL) 25 MG tablet Take 50 mg by mouth 2 times daily       Multiple Vitamins-Minerals (THERAPEUTIC MULTIVITAMIN-MINERALS) tablet Take 1 tablet by mouth daily      Alpha-Lipoic Acid 300 MG CAPS Take 300 mg by mouth daily      aspirin EC 81 MG EC tablet Take 1 tablet by mouth daily. Start taking next week. Indications: OTC 30 tablet 2    [DISCONTINUED] apixaban (ELIQUIS) 2.5 MG TABS tablet Take 1 tablet by mouth 2 times daily 180 tablet 1    [DISCONTINUED] atorvastatin (LIPITOR) 40 MG tablet Take 1 and 1/2 tablets by mouth every evening 135 tablet 1    [DISCONTINUED] propafenone (RYTHMOL) 150 MG tablet Take 1 tablet by mouth every 8 hours 90 tablet 3    [DISCONTINUED] NIFEdipine (ADALAT CC) 60 MG extended release tablet Take 1 tablet by mouth daily 90 tablet 1    [DISCONTINUED] furosemide (LASIX) 40 MG tablet Take 1 tablet by mouth daily 90 tablet 1    guaiFENesin (MUCINEX) 600 MG extended release tablet Take 1 tablet by mouth 2 times daily (Patient not taking: Reported on 1/27/2020) 6 tablet 0    [DISCONTINUED] solifenacin (VESICARE) 5 MG tablet Take 1 tablet by mouth daily. 30 tablet 3     No facility-administered encounter medications on file as of 1/27/2020.          Lab Data:  LIPID:   Lab Results   Component Value Date    CHOL 153 02/04/2019    CHOL 186 07/03/2017    CHOL 151 11/25/2015     Lab Results   Component Value Date TRIG 106 02/04/2019    TRIG 121 07/03/2017    TRIG 72 11/25/2015     Lab Results   Component Value Date    HDL 54 01/24/2020    HDL 66 (H) 02/04/2019    HDL 82 (H) 07/30/2018     Lab Results   Component Value Date    LDLCALC 65 01/24/2020    LDLCALC 66 02/04/2019    LDLCALC 74 07/30/2018     Lab Results   Component Value Date    LABVLDL 41 01/24/2020    LABVLDL 21 02/04/2019    LABVLDL 14 07/30/2018     No results found for: CHOLHDLRATIO  PT/INR: No results for input(s): PROTIME, INR in the last 72 hours. A1C:   Lab Results   Component Value Date    LABA1C 6.4 01/24/2020     BNP:  No results for input(s): BNP in the last 72 hours. IMAGING:   Echo doppler 9.27.18   Summary   Normal left ventricular systolic function with ejection fraction of 55-60%. No regional wall motion abnormalites are seen. Mild concentric left ventricular hypertrophy. Grade I diastolic dysfunction with normal filling pressure. Mild to moderate mitral regurgitation. The left atrium is mildly dilated. Moderate tricuspid regurgitation. Systolic pulmonic artery pressure (SPAP) is estimated at 51 mmHg consistent   with mild pulmonary hypertension (Right atrial pressure of 3 mmHg). Compared to previous study from 3- no changes noted. Stress Test [place]) 4/25/17  Summary  There is normal isotope uptake at stress and rest. There is no evidence of  myocardial ischemia or scar. Hyperdynamic LV systolic function with OW>72%  with uniform wall motion. Low risk study. Carotid dopplers 3/16/17  Summary    1. There is moderate plaque seen in the right internal carotid artery with  an estimated diameter reduction of approaching 50%, this could be  underestimated due to calcific shadowing. 2. There is moderate plaque seen in the left internal carotid artery with an  estimated diameter reduction of approaching 50%. 3. The vertebral arteries are patent with antegrade flow bilaterally.   4. Compared to previous exam on 3. Ok for tooth extraction- stop Aspirin for 5-7 days prior and Eliquis for 2 days prior   4. Follow up with me in July       QUALITY MEASURES  1. Tobacco Cessation Counseling: NA  2. Retake of BP if >140/90:   NA  3. Documentation to PCP/referring for new patient:  Sent to PCP at close of office visit  4. CAD patient on anti-platelet: Yes  5. CAD patient on STATIN therapy:  Yes  6. Patient with CHF and aFib on anticoagulation:  Yes     This note was scribed in the presence of Raf Lucas MD by Venessa Sharma RN  I, Dr. Kristen Almazan, personally performed the services described in this documentation, as scribed by the above signed scribe in my presence. It is both accurate and complete to my knowledge. I agree with the details independently gathered by the clinical support staff, while the remaining scribed note accurately describes my personal service to the patient.       Trecia Brittle Gupta,M.D

## 2020-01-27 NOTE — PROGRESS NOTES
[facility] Office Note  1/27/2020     Subjective: Rosa Madison is here for follow up for PAF, dCHF, HTN, HLD. [place]: Today she brings blood pressure reading from home which shows elevated systolic  number most of the times. She has some SOB related to her asthma. She uses BIPAP nightly with 2 liter of O2. She will be having a tooth extraction soon and will need to hold her anticoagulation therapy. She uses a wheelchair if she has to walk far due to being unsteady. She uses a walker at home. Patient currently denies any weight gain, edema, palpitations, chest pain, dizziness, and syncope. PMH:  She has a past medical history of PAF, dCHF, HTN, HLD as well as DM, BETSY on CPAP, CVD s/p R-CEA, CKD3, Parkinsons and COPD. Her most recent stress test on 4/25/17 showed no evidence of  myocardial ischemia or scar. Carotid dopplers 3/16/17 showed moderate plaque. An echocardiogram from 9/27/18 demonstrates an EF of 11-58%, grade I diastolic dysfunction with normal filling pressure, mild to moderate mitral regurgitation, systolic pulmonic artery pressure (SPAP) is estimated at 51 mmHg consistent   with mild pulmonary hypertension (Right atrial pressure of 3 mmHg). Review of Systems:         12 point ROS negative in all areas as listed below except as in [place]  Constitutional, EENT, Cardiovascular, pulmonary, GI, , Musculoskeletal, skin, neurological, hematological, endocrine, Psychiatric    Reviewed past medical history, social, and family history.    Smoking none  Alcohol none  Family history reviewed not contributory    Past Medical History:   Diagnosis Date    Anemia, unspecified 2010    neg bone marrow    Asthma     Atrial fibrillation ([address]     Baker's cyst     Blind left eye     Carotid artery stenoses     Carotid stenosis 2002    left    Cervical spinal cord compression ([address] 11/2010    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-sided sciatica 8/9/2016    CKD (chronic kidney disease) stage 3, GFR 30-59 ml/min (HCC)     Community acquired pneumonia of left lower lobe of lung ([facility] [address] 9/8/2018    CRI     Detached retina, left     Diverticulosis     DJD (degenerative joint disease)     Edema     chronic    Fatty liver     GERD (gastroesophageal reflux disease)     Herniated lumbar intervertebral disc     Hx stage 2 sacral decubitus ulcer 4/13/2018    Hyperlipidemia     Hypertension     Hypothyroid 1/27/2015    Morbid obesity with BMI of 40.0-44.9, adult ([facility] [address] 2/10/2017    BETSY treated with BiPAP     Partial symptomatic epilepsy with complex partial seizures, not intractable, without status epilepticus ([facility] [address] 1/24/2020    Restless leg syndrome     Restless legs syndrome     Sleep apnea     Tremor, essential 8/16/2010    Type 2 diabetes mellitus without complication [facility]     Type II or unspecified type diabetes mellitus without mention of complication, not stated as uncontrolled      Past Surgical History:   Procedure Laterality Date    APPENDECTOMY      CAROTID ENDARTERECTOMY      left    CATARACT REMOVAL      CERVICAL FUSION  11/2010    CHOLECYSTECTOMY      CORNEAL TRANSPLANT      EYE SURGERY      HERNIA REPAIR      HYSTERECTOMY      JOINT REPLACEMENT      KNEE SURGERY      replacement x 2    SHOULDER SURGERY      TONSILLECTOMY      TUBAL LIGATION      VITRECTOMY         Objective:   /64   Pulse 72   Ht 5' 6\" (1.676 m)   Wt 262 lb 4.8 oz (119 kg)   SpO2 94%   BMI 42.34 kg/m²     Wt Readings from Last 3 Encounters:   01/27/20 262 lb 4.8 oz (119 kg)   01/24/20 256 lb 12.8 oz (116.5 kg)   12/12/19 263 lb (119.3 kg)       Physical Exam:  General: No Respiratory distress, appears well developed and well nourished.    Eyes:  Sclera nonicteric  Nose/Sinuses:  negative findings: nose shows no deformity, asymmetry, or inflammation, nasal mucosa normal, septum midline with no perforation or bleeding  Back:  no pain to palpation  Joint:  no active joint artery. There is no significant change from previous exam       Echo 3/17/2017:  No change from echo done 3/4/2014. Left ventricular systolic function is normal with the ejection fraction   estimated at 55%. No regional wall motion abnormalities. Grade II diastolic dysfunction with elevated filling pressure. Mild concentric left ventricular hypertrophy with a sigmoid septum. No   outflow obstruction. Left ventricle size is normal.   Mild mitral annular calcification. Mitral valve leaflets are structurally normal.   Calcification in the chordae of the mitral valve. Mild mitral valve regurgitation. Aortic valve leaflets are mildly thickened with normal opening. No aortic valve regurgitation. Echocardiogram 9/27/18  Summary   Normal left ventricular systolic function with ejection fraction of 55-60%. No regional wall motion abnormalites are seen. Mild concentric left ventricular hypertrophy. Grade I diastolic dysfunction with normal filling pressure. Mild to moderate mitral regurgitation. The left atrium is mildly dilated. Moderate tricuspid regurgitation. Systolic pulmonic artery pressure (SPAP) is estimated at 51 mmHg consistent   with mild pulmonary hypertension (Right atrial pressure of 3 mmHg). Compared to previous study from 3- no changes noted. Assessment:  1. Paroxysmal atrial fibrillation   2. Chronic diastolic failure   3. Mild to moderate mitral regurgitation. 4. Mild pulmonary hypertension   5. Hyperlipidemia   -1/24/2020 , HDL 54, LDL 65,   6. Hypertension   7. Diabetes mellitis  8. Carotid artery disease   9. Obesity   10. Sleep apnea    -on BIPAP        Plan:  1. Start Hydralazine 25 mg twice a day for better blood pressure control   2. Continue other medications   3. Ok for tooth extraction- stop Aspirin for 5-7 days prior and Eliquis for 2 days prior   4. Follow up with me in July       QUALITY MEASURES  1.  Tobacco Cessation

## 2020-02-10 ENCOUNTER — CARE COORDINATION (OUTPATIENT)
Dept: CARE COORDINATION | Age: 80
End: 2020-02-10

## 2020-02-10 NOTE — CARE COORDINATION
150 MG tablet Take 1 tablet by mouth every 8 hours 1/27/20   Glen Lovelace MD   NIFEdipine (ADALAT CC) 60 MG extended release tablet Take 1 tablet by mouth daily 1/27/20   Glen Lovelace MD   furosemide (LASIX) 40 MG tablet Take 1 tablet by mouth daily 1/27/20   Glen Lovelace MD   metoprolol tartrate (LOPRESSOR) 50 MG tablet Take 1 tablet by mouth 2 times daily 1/24/20   Rosette Martino MD   montelukast (SINGULAIR) 10 MG tablet Take 1 tablet by mouth daily. 1/24/20   Rosette Martino MD   budesonide (PULMICORT FLEXHALER) 180 MCG/ACT AEPB inhaler Inhale 2 puffs into the lungs 2 times daily 1/24/20   Rosette Martino MD   tiZANidine (ZANAFLEX) 2 MG tablet TAKE 1-2 TABLETS NIGHTLY AS NEEDED FOR JAW PAIN. 1/20/20   BIJAN Cortez   DULoxetine (CYMBALTA) 60 MG extended release capsule Take 1 capsule by mouth daily 1/15/20   BIJAN Cortez   spironolactone (ALDACTONE) 25 MG tablet Take 1 tablet by mouth daily 11/14/19   BIJAN Cortez   salmeterol (SEREVENT) 50 MCG/DOSE diskus inhaler Inhale 1 puff by mouth 2 times a day. 10/7/19   Telford Seip, MD   Handicap Placard MISC by Does not apply route DX: G20  parkinson's disease  Exp: 9/1/2024 9/27/19   BIJAN Cortez   fluticasone Elinore Pock) 50 MCG/ACT nasal spray USE 2 PUFFS IN [facility] NOSTRIL DAILY 6/19/19   BIJAN Cortez   guaiFENesin [facility]) 600 MG extended release tablet Take 1 tablet by mouth 2 times daily  Patient not taking: Reported on 1/27/2020 5/28/19   Eunice Caro MD   lidocaine (XYLOCAINE) 5 % ointment Apply 1 Applicatorful topically as needed for Pain Apply topically as needed.     Historical Provider, MD   ipratropium-albuterol (DUONEB) 0.5-2.5 (3) MG/3ML SOLN nebulizer solution Inhale 3 mLs into the lungs every 6 hours as needed for Shortness of Breath DX: J45.40 2/6/19   BIJAN Cortez   carbidopa-levodopa (SINEMET)  MG per tablet TAKE 1 TABLET BY MOUTH 3 TIMES A DAY 7/30/18   Telford Seip, MD   albuterol sulfate HFA (VENTOLIN HFA) 108 (90 Base) MCG/ACT inhaler Inhale 2 puffs into the lungs every 6 hours as needed for Wheezing 7/30/18   Tiffanie Cat MD   lamoTRIgine (LAMICTAL) 25 MG tablet Take 50 mg by mouth 2 times daily     Historical Provider, MD   Multiple Vitamins-Minerals (THERAPEUTIC MULTIVITAMIN-MINERALS) tablet Take 1 tablet by mouth daily    Historical Provider, MD   Alpha-Lipoic Acid 300 MG CAPS Take 300 mg by mouth daily    Historical Provider, MD   solifenacin (VESICARE) 5 MG tablet Take 1 tablet by mouth daily. 6/25/13 10/29/13  Tiffanie Cat MD   aspirin EC 81 MG EC tablet Take 1 tablet by mouth daily. Start taking next week. Indications: OTC 2/25/13   Oneil Stone MD       Future Appointments   Date Time Provider Katy Booker   2/13/2020  8:00 AM Jackie Gomez PT SACHIN PT Erica Stuart   [facility]2020 10[facility]   6/16/2020  9:45 AM Leellen Bollman, MD [facility]ralee [facility]   11/25/2020  9:30 AM Lazaro Dsouza APRN - CNP [facility]     ,   Diabetes Assessment    Medic Alert ID:  No  Meal Planning:  Avoidance of concentrated sweets   How often do you test your blood sugar?:  Daily   Do you have barriers with adherence to non-pharmacologic self-management interventions?  (Nutrition/Exercise/Self-Monitoring):  No   Have you ever had to go to the ED for symptoms of low blood sugar?:  No       No patient-reported symptoms   Do you have hyperglycemia symptoms?:  No   Do you have hypoglycemia symptoms?:  No   Blood Sugar Monitoring Regimen:  Once a Day      ,   Congestive Heart Failure Assessment    Do you understand a low sodium diet?:  Yes  Do you understand how to read food labels?:  Yes  How many restaurant meals do you eat per week?:  1-2  Do you salt your food before tasting it?:  No         Symptoms:         ,   COPD Assessment    Does the patient understand envrionmental exposure?:  Yes  Is the patient able to verbalize Rescue vs. Long Acting

## 2020-02-13 ENCOUNTER — HOSPITAL ENCOUNTER (OUTPATIENT)
Dept: PHYSICAL THERAPY | Age: 80
Setting detail: THERAPIES SERIES
Discharge: HOME | End: 2020-02-13
Payer: MEDICARE

## 2020-02-13 PROCEDURE — 97140 MANUAL THERAPY 1/> REGIONS: CPT

## 2020-02-13 PROCEDURE — 97163 PT EVAL HIGH COMPLEX 45 MIN: CPT

## 2020-02-13 ASSESSMENT — PAIN DESCRIPTION - LOCATION: LOCATION: HEAD

## 2020-02-13 ASSESSMENT — PAIN SCALES - GENERAL: PAINLEVEL_OUTOF10: 3

## 2020-02-13 NOTE — FLOWSHEET NOTE
for Next Session: Biomechanical correction of problems as they present. Facilitate correct muscle firing patterns and activation with appropriate activities. Progress patient as tolerated.       Re-Certification Due Date:         Signature:  Hubert Rangel , PT

## 2020-02-13 NOTE — PROGRESS NOTES
Outpatient Physical Therapy  Phone: [phone] Fax: [phone]     To: Елена Solares MD       From: Jackie Gomez PT     Patient: Laquetta Boeck         : 1940  Diagnosis: neck strain       Date: 2020    Physical Therapy Certification/Re-Certification Form  Dear Dr. Kiran Diane,  The following patient has been evaluated for physical therapy services and for therapy to continue, Medicare requires monthly physician review of the treatment plan. Please review the attached evaluation and/or summary of the patient's plan of care, and verify that you agree therapy should continue by signing the attached document and sending it back to our office. Plan of Care/Treatment to date:  [x] Therapeutic Exercise   [] Modalities:  [] Therapeutic Activity     [] Ultrasound  [] Electrical Stimulation   [] Gait Training      [] Cervical Traction [] Lumbar Traction  [x] Neuromuscular Re-education  [] Hot/Coldpack [] Iontophoresis    [x] Instruction in HEP      Other:  [x] Manual Therapy       []                        [] Aquatic Therapy       []                      ? Frequency/Duration:  # Days per week: [] 1 day # Weeks: [] 1 week [] 5 weeks      [x] 2 days? [] 2 weeks [] 6 weeks     [] 3 days   [] 3 weeks [] 7 weeks     [] 4 days   [x] 4 weeks [] 8 weeks    Rehab Potential: [] Excellent [] Good [] Fair  [] Poor       Electronically signed by:  Jackie Gomez PT      If you have any questions or concerns, please don't hesitate to call. Thank you for your referral.    Physician Signature:________________________________Date:__________________  By signing above, therapists plan is approved by physician        Physical Therapy  Initial Assessment  Date: 2020  Patient Name: Laquetta Boeck  MRN: [id number]  : 1940     Treatment Diagnosis: S16. 1XXA    Restrictions  Position Activity Restriction  Other position/activity restrictions: limited ambulation due to PD    Subjective   General  Chart testing; (-) Hoffmans and clonus BUE; DTRs - C5 0 BUE, C6 2+ BUE  Joint Mobility  Spine: cervical - C4-6 fusion, C2/3 hypomobile B but difficulty with assessment due to pt's inability to relax; CTJ - hypomobile with B rotation    Strength RUE  R Shoulder Flexion: 4+/5  R Shoulder ABduction: 4+/5  R Shoulder Internal Rotation: 4/5  R Shoulder External Rotation: 4/5  R Shoulder Horizontal ABduction: 4-/5  R Shoulder Horizontal ADduction: 4+/5  R Elbow Flexion: 4+/5  R Elbow Extension: 4+/5  Strength LUE  L Shoulder Flexion: 4+/5  L Shoulder ABduction: 4+/5  L Shoulder Internal Rotation: 4+/5  L Shoulder External Rotation: 4+/5  L Shoulder Horizontal ABduction: 4+/5  L Shoulder Horizontal ADduction: 4+/5  L Elbow Flexion: 4+/5  L Elbow Extension: 4+/5        Sensation  Overall Sensation Status: WFL    Assessment   Conditions Requiring Skilled Therapeutic Intervention  Body structures, Functions, Activity limitations: Decreased functional mobility ; Decreased ADL status; Decreased ROM; Decreased strength;Decreased balance; Increased pain;Decreased posture  Assessment: Pt presented to physical therapy with neck pain s/p fall 1 month ago. No red flag findings at this time. Pt does have a long list of comorbidities but no new symptoms other than pain. Pt demonstrated decreased cervical ROM, more notably with R rotation. Pt also with decreased jointmoiblity of upper cervical spine and CTJ but assessment limited due to pt guarding. Treatment Diagnosis: S16. 1XXA  Prognosis: Good  Decision Making: High Complexity  REQUIRES PT FOLLOW UP: Yes  Activity Tolerance  Activity Tolerance: Patient Tolerated treatment well         Plan   Plan  Times per week: 2x/wk  Plan weeks: 4 weeks  Current Treatment Recommendations: Strengthening, ROM, Balance Training, Endurance Training, Manual Therapy - Soft Tissue Mobilization, Neuromuscular Re-education, Home Exercise Program, Transfer Training    Goals  Short term goals  Time Frame for Short term goals: 4 weeks  Short term goal 1: pt will be indep in HEP  Short term goal 2: pt will decrease pain 25%  Short term goal 3: pt will increase cervical ROM by 25%  Short term goal 4: pt will improve CTJ joint mobility       Therapy Time   Individual Concurrent Group Co-treatment   Time In 0800         Time Out 0900         Minutes 60         Timed Code Treatment Minutes: [id number] [facility] [facility], [address] [place] [address] [address]

## 2020-02-14 RX ORDER — TIZANIDINE 2 MG/1
TABLET ORAL
Qty: 60 TABLET | Refills: 1 | OUTPATIENT
Start: 2020-02-14

## 2020-02-19 ENCOUNTER — HOSPITAL ENCOUNTER (OUTPATIENT)
Dept: PHYSICAL THERAPY | Age: 80
Setting detail: THERAPIES SERIES
Discharge: HOME | End: 2020-02-19
Payer: MEDICARE

## 2020-02-19 PROCEDURE — 97140 MANUAL THERAPY 1/> REGIONS: CPT

## 2020-02-19 NOTE — FLOWSHEET NOTE
Physical Therapy Daily Treatment Note    Date:  2020    Patient Name:  Joanna Nieto    :  1940  MRN: [id number]  Restrictions/Precautions:    Medical/Treatment Diagnosis Information:  · Diagnosis: neck strain  Insurance/Certification information:  PT Insurance Information: Medicare  Physician Information:  Referring Practitioner: Donnella Schilder, MD  Plan of care signed (Y/N):  N  Visit# / total visits:       G-Code (if applicable):          NDI 10/50    Medicare Cap (if applicable):  198 = total amount used, updated 2020    Time in:   9:25      Timed Treatment: 30 Total Treatment Time:  30  ________________________________________________________________________________________    Pain Level:    /10  SUBJECTIVE:  Pt reports that she felt like she had more range of motion this weekend but woke up tight this morning. OBJECTIVE:     Exercise/Equipment Resistance/Repetitions Other comments          Supine R rotation x5                                                                                                                                Other Therapeutic Activities:      Manual Treatments:    General cervical side glides from L, upper cervical distraction and suboccipital release, STM to R cervical paraspinals and upper trap in supine and seated with RUE support, MET for R rotation     Modalities:      Test/Measurements:  See eval       ASSESSMENT:  Pt tolerated session well. R rotation ROM increased by 15* post treatment.        Treatment/Activity Tolerance:   [x]Patient tolerated treatment well [] Patient limited by fatique  []Patient limited by pain [] Patient limited by other medical complications  [] Other:     Goals:    Short term goals  Time Frame for Short term goals: 4 weeks  Short term goal 1: pt will be indep in HEP  Short term goal 2: pt will decrease pain 25%  Short term goal 3: pt will increase cervical ROM by 25%  Short term goal 4: pt will improve CTJ joint mobility

## 2020-02-21 ENCOUNTER — HOSPITAL ENCOUNTER (OUTPATIENT)
Dept: PHYSICAL THERAPY | Age: 80
Setting detail: THERAPIES SERIES
Discharge: HOME | End: 2020-02-21
Payer: MEDICARE

## 2020-02-21 PROCEDURE — 97140 MANUAL THERAPY 1/> REGIONS: CPT

## 2020-02-21 NOTE — FLOWSHEET NOTE
Physical Therapy Daily Treatment Note    Date:  2020    Patient Name:  Patric Multani    :  1940  MRN: [id number]  Restrictions/Precautions:    Medical/Treatment Diagnosis Information:  · Diagnosis: neck strain  Insurance/Certification information:  PT Insurance Information: Medicare  Physician Information:  Referring Practitioner: Amina Pelayo MD  Plan of care signed (Y/N):  N  Visit# / total visits:  3/8     G-Code (if applicable):          NDI 10/50    Medicare Cap (if applicable):  102 = total amount used, updated 2020    Time in:   10:00     Timed Treatment: 30 Total Treatment Time:  30  ________________________________________________________________________________________    Pain Level:    /10  SUBJECTIVE:  Pt reports that she is getting better. Her ROM is improving. OBJECTIVE:     Exercise/Equipment Resistance/Repetitions Other comments          Supine R rotation x5                                                                                                                                Other Therapeutic Activities:      Manual Treatments:    General cervical side glides from L, upper cervical distraction and suboccipital release, STM to R cervical paraspinals and upper trap in supine and seated with RUE support, MET for R rotation     Modalities:      Test/Measurements:  See eval       ASSESSMENT:  Pt tolerated session well. R rotation ROM increased by 15* post treatment.        Treatment/Activity Tolerance:   [x]Patient tolerated treatment well [] Patient limited by fatique  []Patient limited by pain [] Patient limited by other medical complications  [] Other:     Goals:    Short term goals  Time Frame for Short term goals: 4 weeks  Short term goal 1: pt will be indep in HEP  Short term goal 2: pt will decrease pain 25%  Short term goal 3: pt will increase cervical ROM by 25%  Short term goal 4: pt will improve CTJ joint mobility           Plan: [x] Continue per plan of care [] Alter current plan (see comments)   [] Plan of care initiated [] Hold pending MD visit [] Discharge      Plan for Next Session: Biomechanical correction of problems as they present. Facilitate correct muscle firing patterns and activation with appropriate activities. Progress patient as tolerated.       Re-Certification Due Date:         Signature:  Rambo Perry PT

## 2020-02-24 ENCOUNTER — HOSPITAL ENCOUNTER (OUTPATIENT)
Dept: PHYSICAL THERAPY | Age: 80
Setting detail: THERAPIES SERIES
Discharge: HOME | End: 2020-02-24
Payer: MEDICARE

## 2020-02-24 PROCEDURE — 97140 MANUAL THERAPY 1/> REGIONS: CPT

## 2020-02-24 NOTE — FLOWSHEET NOTE
care [] Alter current plan (see comments)   [] Plan of care initiated [] Hold pending MD visit [] Discharge      Plan for Next Session: Biomechanical correction of problems as they present. Facilitate correct muscle firing patterns and activation with appropriate activities. Progress patient as tolerated.       Re-Certification Due Date:         Signature:  Ruth Rolle PT

## 2020-02-28 ENCOUNTER — HOSPITAL ENCOUNTER (OUTPATIENT)
Dept: PHYSICAL THERAPY | Age: 80
Setting detail: THERAPIES SERIES
Discharge: HOME | End: 2020-02-28
Payer: MEDICARE

## 2020-02-28 PROCEDURE — 97140 MANUAL THERAPY 1/> REGIONS: CPT

## 2020-02-29 ENCOUNTER — APPOINTMENT (OUTPATIENT)
Dept: GENERAL RADIOLOGY | Age: 80
DRG: 205 | End: 2020-02-29
Payer: MEDICARE

## 2020-02-29 ENCOUNTER — APPOINTMENT (OUTPATIENT)
Dept: CT IMAGING | Age: 80
DRG: 205 | End: 2020-02-29
Payer: MEDICARE

## 2020-02-29 ENCOUNTER — HOSPITAL ENCOUNTER (INPATIENT)
Age: 80
LOS: 19 days | Discharge: NURSING, REHAB OR LONG-TERM CARE | DRG: 205 | End: 2020-03-20
Attending: EMERGENCY MEDICINE | Admitting: INTERNAL MEDICINE
Payer: MEDICARE

## 2020-02-29 LAB
A/G RATIO: 1 (ref 1.1–2.2)
ALBUMIN SERPL-MCNC: 3.5 G/DL (ref 3.4–5)
ALP BLD-CCNC: 119 U/L (ref 40–129)
ALT SERPL-CCNC: <5 U/L (ref 10–40)
ANION GAP SERPL CALCULATED.3IONS-SCNC: 13 MMOL/L (ref 3–16)
AST SERPL-CCNC: 23 U/L (ref 15–37)
BASOPHILS ABSOLUTE: 0.1 K/UL (ref 0–0.2)
BASOPHILS RELATIVE PERCENT: 0.6 %
BILIRUB SERPL-MCNC: <0.2 MG/DL (ref 0–1)
BUN BLDV-MCNC: 31 MG/DL (ref 7–20)
CALCIUM SERPL-MCNC: 9.7 MG/DL (ref 8.3–10.6)
CHLORIDE BLD-SCNC: 93 MMOL/L (ref 99–110)
CO2: 26 MMOL/L (ref 21–32)
CREAT SERPL-MCNC: 2.1 MG/DL (ref 0.6–1.2)
EOSINOPHILS ABSOLUTE: 0.1 K/UL (ref 0–0.6)
EOSINOPHILS RELATIVE PERCENT: 0.5 %
GFR AFRICAN AMERICAN: 28
GFR NON-AFRICAN AMERICAN: 23
GLOBULIN: 3.4 G/DL
GLUCOSE BLD-MCNC: 110 MG/DL (ref 70–99)
HCT VFR BLD CALC: 32.5 % (ref 36–48)
HEMOGLOBIN: 10.3 G/DL (ref 12–16)
LYMPHOCYTES ABSOLUTE: 1.7 K/UL (ref 1–5.1)
LYMPHOCYTES RELATIVE PERCENT: 11.4 %
MCH RBC QN AUTO: 27.7 PG (ref 26–34)
MCHC RBC AUTO-ENTMCNC: 31.8 G/DL (ref 31–36)
MCV RBC AUTO: 87.1 FL (ref 80–100)
MONOCYTES ABSOLUTE: 0.8 K/UL (ref 0–1.3)
MONOCYTES RELATIVE PERCENT: 5.3 %
NEUTROPHILS ABSOLUTE: 12.6 K/UL (ref 1.7–7.7)
NEUTROPHILS RELATIVE PERCENT: 82.2 %
PDW BLD-RTO: 14.4 % (ref 12.4–15.4)
PLATELET # BLD: 326 K/UL (ref 135–450)
PMV BLD AUTO: 8.1 FL (ref 5–10.5)
POTASSIUM REFLEX MAGNESIUM: 5.3 MMOL/L (ref 3.5–5.1)
RBC # BLD: 3.73 M/UL (ref 4–5.2)
SODIUM BLD-SCNC: 132 MMOL/L (ref 136–145)
TOTAL PROTEIN: 6.9 G/DL (ref 6.4–8.2)
WBC # BLD: 15.3 K/UL (ref 4–11)

## 2020-02-29 PROCEDURE — 6370000000 HC RX 637 (ALT 250 FOR IP): Performed by: NURSE PRACTITIONER

## 2020-02-29 PROCEDURE — 80053 COMPREHEN METABOLIC PANEL: CPT

## 2020-02-29 PROCEDURE — 71045 X-RAY EXAM CHEST 1 VIEW: CPT

## 2020-02-29 PROCEDURE — 85025 COMPLETE CBC W/AUTO DIFF WBC: CPT

## 2020-02-29 PROCEDURE — 84145 PROCALCITONIN (PCT): CPT

## 2020-02-29 PROCEDURE — 71250 CT THORAX DX C-: CPT

## 2020-02-29 PROCEDURE — 70450 CT HEAD/BRAIN W/O DYE: CPT

## 2020-02-29 PROCEDURE — 99285 EMERGENCY DEPT VISIT HI MDM: CPT

## 2020-02-29 PROCEDURE — 72125 CT NECK SPINE W/O DYE: CPT

## 2020-02-29 RX ORDER — MORPHINE SULFATE 2 MG/ML
2 INJECTION, SOLUTION INTRAMUSCULAR; INTRAVENOUS ONCE
Status: COMPLETED | OUTPATIENT
Start: 2020-03-01 | End: 2020-03-01

## 2020-02-29 RX ORDER — OXYCODONE HYDROCHLORIDE AND ACETAMINOPHEN 5; 325 MG/1; MG/1
2 TABLET ORAL ONCE
Status: COMPLETED | OUTPATIENT
Start: 2020-02-29 | End: 2020-02-29

## 2020-02-29 RX ORDER — 0.9 % SODIUM CHLORIDE 0.9 %
500 INTRAVENOUS SOLUTION INTRAVENOUS ONCE
Status: COMPLETED | OUTPATIENT
Start: 2020-02-29 | End: 2020-03-01

## 2020-02-29 RX ADMIN — OXYCODONE HYDROCHLORIDE AND ACETAMINOPHEN 2 TABLET: 5; 325 TABLET ORAL at 17:39

## 2020-02-29 ASSESSMENT — PAIN DESCRIPTION - PAIN TYPE: TYPE: ACUTE PAIN

## 2020-02-29 ASSESSMENT — ENCOUNTER SYMPTOMS
SHORTNESS OF BREATH: 0
SORE THROAT: 0
RHINORRHEA: 0
ABDOMINAL PAIN: 0
COLOR CHANGE: 0

## 2020-02-29 ASSESSMENT — PAIN SCALES - GENERAL
PAINLEVEL_OUTOF10: 9
PAINLEVEL_OUTOF10: 9

## 2020-02-29 ASSESSMENT — PAIN DESCRIPTION - ORIENTATION: ORIENTATION: RIGHT

## 2020-02-29 ASSESSMENT — PAIN DESCRIPTION - LOCATION: LOCATION: RIB CAGE

## 2020-02-29 ASSESSMENT — PAIN DESCRIPTION - FREQUENCY: FREQUENCY: CONTINUOUS

## 2020-02-29 NOTE — ED PROVIDER NOTES
[address] ED  EMERGENCY DEPARTMENT ENCOUNTER        Pt Name: Jennifer Sequeira  MRN: [id number]  [place] 1940  Dateof evaluation: 2/29/2020  Provider: MAHAD Staples - CNP  PCP: Axel Mehta MD  ED Attending: No att. providers found    CHIEF COMPLAINT       Chief Complaint   Patient presents with   [place]     pt states \"I have Parkinsons and I was walking into the garage to get into the car and my legs gave out on me\" pt's  states pt fell onto her butt and her right side hit into the car, pt c/o right ribs pain, right shoulder pain, and right clavicle pain, pt states she hit her head but did not have LOC, pt takes eliquis and baby aspirin dailiy        HISTORY OF PRESENTILLNESS   (Location/Symptom, Timing/Onset, Context/Setting, Quality, Duration, Modifying Factors, Severity)  Note limiting factors. Jennifer Sequeira is a 70 y.o. female for mechanical fall. Onset was today. Duration has been since the onset. Context includes pt states she has parkinson and is unsteady at baseline. She reports she was going to the car today and her legs gave out, she fell and hit her head and right posterior rib cage. Pt denies any light headedness or dizziness. Alleviating factors include nothing. Aggravating factors include nothing. Pain is 9/10. nothing has been used for pain today. Nursing Notes were all reviewed and agreed with or any disagreements were addressed  in the HPI. REVIEW OF SYSTEMS    (2-9 systems for level 4, 10 or more for level 5)     Review of Systems   Constitutional: Negative for fever. Fall   HENT: Negative for congestion, rhinorrhea and sore throat. Respiratory: Negative for shortness of breath. Right posterior rib pain   Cardiovascular: Negative for chest pain. Gastrointestinal: Negative for abdominal pain. Genitourinary: Negative for decreased urine volume and difficulty urinating.    Musculoskeletal: Negative for arthralgias 0.9 % sodium chloride bolus (has no administration in time range)   oxyCODONE-acetaminophen (PERCOCET) 5-325 MG per tablet 2 tablet (2 tablets Oral Given 2/29/20 1739)       Patient was seen and evaluated by myself. Patient here for mechanical fall that she had today. Patient reports that she has a history of Parkinson's and was attempting to get up today and fell landing on her right posterior chest.  Patient states that she has had pain since the fall and is having difficulty taking a deep breath due to pain. She denies hitting her head. On exam she is awake and alert hemodynamically stable nontoxic in appearance. Patient does appear uncomfortable. She was provided with pain medications in the ED. Chest x-ray was negative however it was a portable chest x-ray due to the fact that she has difficulty standing. An attempt was made to medicate the patient and discharge her home however she was unable to get out of the stretcher due to pain. I did forewarn the hospitalist the patient may need to be admitted for pain control if she was unable to be controlled in the ED.    2020-patient was unable to get out of bed in an effort to be discharged home. At this point orders were placed for blood work and a CT of her chest.  Nursing staff had extremely difficult time getting her IV placed which took multiple hours. Her IV was finally obtained and her lab values were reviewed. Patient was found to have chronic kidney disease and hyperkalemia. At this point she was provided with IV fluids. Her CT has now been changed to a CT without contrast.    5913-patient is currently at CT. Dr. Dana Landaverde notified of the patient's need to have a CT reviewed and then potentially admitted to the hospital for pain control. 0006-consult placed to the hospitalist for adm. Pt reports she now has an abrasion to the dorsal of her left hand.  Primary rn was notified and wound care was provided    The patient tolerated their visit

## 2020-03-01 PROBLEM — R52 INTRACTABLE PAIN: Status: ACTIVE | Noted: 2020-03-01

## 2020-03-01 PROBLEM — N18.30 CHRONIC KIDNEY DISEASE, STAGE 3 (HCC): Status: ACTIVE | Noted: 2020-03-01

## 2020-03-01 LAB
ANION GAP SERPL CALCULATED.3IONS-SCNC: 11 MMOL/L (ref 3–16)
BUN BLDV-MCNC: 32 MG/DL (ref 7–20)
CALCIUM SERPL-MCNC: 9.3 MG/DL (ref 8.3–10.6)
CHLORIDE BLD-SCNC: 95 MMOL/L (ref 99–110)
CO2: 26 MMOL/L (ref 21–32)
CREAT SERPL-MCNC: 2.3 MG/DL (ref 0.6–1.2)
EKG ATRIAL RATE: 70 BPM
EKG DIAGNOSIS: NORMAL
EKG P AXIS: 67 DEGREES
EKG P-R INTERVAL: 192 MS
EKG Q-T INTERVAL: 396 MS
EKG QRS DURATION: 112 MS
EKG QTC CALCULATION (BAZETT): 427 MS
EKG R AXIS: 52 DEGREES
EKG T AXIS: 81 DEGREES
EKG VENTRICULAR RATE: 70 BPM
GFR AFRICAN AMERICAN: 25
GFR NON-AFRICAN AMERICAN: 21
GLUCOSE BLD-MCNC: 113 MG/DL (ref 70–99)
GLUCOSE BLD-MCNC: 123 MG/DL (ref 70–99)
PERFORMED ON: ABNORMAL
POTASSIUM SERPL-SCNC: 5.1 MMOL/L (ref 3.5–5.1)
PROCALCITONIN: 0.14 NG/ML (ref 0–0.15)
SODIUM BLD-SCNC: 132 MMOL/L (ref 136–145)

## 2020-03-01 PROCEDURE — 2700000000 HC OXYGEN THERAPY PER DAY

## 2020-03-01 PROCEDURE — 1200000000 HC SEMI PRIVATE

## 2020-03-01 PROCEDURE — 80048 BASIC METABOLIC PNL TOTAL CA: CPT

## 2020-03-01 PROCEDURE — 2580000003 HC RX 258: Performed by: NURSE PRACTITIONER

## 2020-03-01 PROCEDURE — 94640 AIRWAY INHALATION TREATMENT: CPT

## 2020-03-01 PROCEDURE — 93005 ELECTROCARDIOGRAM TRACING: CPT | Performed by: NURSE PRACTITIONER

## 2020-03-01 PROCEDURE — 6360000002 HC RX W HCPCS: Performed by: INTERNAL MEDICINE

## 2020-03-01 PROCEDURE — 97162 PT EVAL MOD COMPLEX 30 MIN: CPT

## 2020-03-01 PROCEDURE — 97535 SELF CARE MNGMENT TRAINING: CPT

## 2020-03-01 PROCEDURE — 93010 ELECTROCARDIOGRAM REPORT: CPT | Performed by: INTERNAL MEDICINE

## 2020-03-01 PROCEDURE — 36415 COLL VENOUS BLD VENIPUNCTURE: CPT

## 2020-03-01 PROCEDURE — 2580000003 HC RX 258: Performed by: INTERNAL MEDICINE

## 2020-03-01 PROCEDURE — 96374 THER/PROPH/DIAG INJ IV PUSH: CPT

## 2020-03-01 PROCEDURE — 94150 VITAL CAPACITY TEST: CPT

## 2020-03-01 PROCEDURE — 97165 OT EVAL LOW COMPLEX 30 MIN: CPT

## 2020-03-01 PROCEDURE — 6370000000 HC RX 637 (ALT 250 FOR IP): Performed by: INTERNAL MEDICINE

## 2020-03-01 PROCEDURE — 6360000002 HC RX W HCPCS: Performed by: NURSE PRACTITIONER

## 2020-03-01 PROCEDURE — 94761 N-INVAS EAR/PLS OXIMETRY MLT: CPT

## 2020-03-01 PROCEDURE — 99219 PR INITIAL OBSERVATION CARE/DAY 50 MINUTES: CPT | Performed by: INTERNAL MEDICINE

## 2020-03-01 PROCEDURE — 97530 THERAPEUTIC ACTIVITIES: CPT

## 2020-03-01 RX ORDER — FUROSEMIDE 40 MG/1
40 TABLET ORAL DAILY
Status: DISCONTINUED | OUTPATIENT
Start: 2020-03-01 | End: 2020-03-03

## 2020-03-01 RX ORDER — MORPHINE SULFATE 2 MG/ML
2 INJECTION, SOLUTION INTRAMUSCULAR; INTRAVENOUS
Status: DISCONTINUED | OUTPATIENT
Start: 2020-03-01 | End: 2020-03-09

## 2020-03-01 RX ORDER — ACETAMINOPHEN 325 MG/1
650 TABLET ORAL EVERY 6 HOURS PRN
Status: DISCONTINUED | OUTPATIENT
Start: 2020-03-01 | End: 2020-03-05

## 2020-03-01 RX ORDER — IPRATROPIUM BROMIDE AND ALBUTEROL SULFATE 2.5; .5 MG/3ML; MG/3ML
1 SOLUTION RESPIRATORY (INHALATION) 4 TIMES DAILY
Status: DISCONTINUED | OUTPATIENT
Start: 2020-03-01 | End: 2020-03-19

## 2020-03-01 RX ORDER — IPRATROPIUM BROMIDE AND ALBUTEROL SULFATE 2.5; .5 MG/3ML; MG/3ML
1 SOLUTION RESPIRATORY (INHALATION) EVERY 6 HOURS PRN
Status: DISCONTINUED | OUTPATIENT
Start: 2020-03-01 | End: 2020-03-01

## 2020-03-01 RX ORDER — NIFEDIPINE 30 MG/1
60 TABLET, EXTENDED RELEASE ORAL DAILY
Status: DISCONTINUED | OUTPATIENT
Start: 2020-03-01 | End: 2020-03-20 | Stop reason: HOSPADM

## 2020-03-01 RX ORDER — ONDANSETRON 2 MG/ML
4 INJECTION INTRAMUSCULAR; INTRAVENOUS EVERY 6 HOURS PRN
Status: DISCONTINUED | OUTPATIENT
Start: 2020-03-01 | End: 2020-03-20 | Stop reason: HOSPADM

## 2020-03-01 RX ORDER — HYDRALAZINE HYDROCHLORIDE 25 MG/1
25 TABLET, FILM COATED ORAL 2 TIMES DAILY
Status: DISCONTINUED | OUTPATIENT
Start: 2020-03-01 | End: 2020-03-17

## 2020-03-01 RX ORDER — LAMOTRIGINE 25 MG/1
50 TABLET ORAL 2 TIMES DAILY
Status: DISCONTINUED | OUTPATIENT
Start: 2020-03-01 | End: 2020-03-20 | Stop reason: HOSPADM

## 2020-03-01 RX ORDER — LIDOCAINE 4 G/G
1 PATCH TOPICAL DAILY
Status: DISCONTINUED | OUTPATIENT
Start: 2020-03-01 | End: 2020-03-20 | Stop reason: HOSPADM

## 2020-03-01 RX ORDER — MORPHINE SULFATE 4 MG/ML
4 INJECTION, SOLUTION INTRAMUSCULAR; INTRAVENOUS
Status: DISCONTINUED | OUTPATIENT
Start: 2020-03-01 | End: 2020-03-09

## 2020-03-01 RX ORDER — METOPROLOL TARTRATE 50 MG/1
50 TABLET, FILM COATED ORAL 2 TIMES DAILY
Status: DISCONTINUED | OUTPATIENT
Start: 2020-03-01 | End: 2020-03-20 | Stop reason: HOSPADM

## 2020-03-01 RX ORDER — ATORVASTATIN CALCIUM 10 MG/1
20 TABLET, FILM COATED ORAL NIGHTLY
Status: DISCONTINUED | OUTPATIENT
Start: 2020-03-01 | End: 2020-03-20 | Stop reason: HOSPADM

## 2020-03-01 RX ORDER — POLYETHYLENE GLYCOL 3350 17 G/17G
17 POWDER, FOR SOLUTION ORAL DAILY PRN
Status: DISCONTINUED | OUTPATIENT
Start: 2020-03-01 | End: 2020-03-04

## 2020-03-01 RX ORDER — TRAMADOL HYDROCHLORIDE 50 MG/1
100 TABLET ORAL EVERY 6 HOURS PRN
Status: DISCONTINUED | OUTPATIENT
Start: 2020-03-01 | End: 2020-03-12

## 2020-03-01 RX ORDER — PROMETHAZINE HYDROCHLORIDE 25 MG/1
12.5 TABLET ORAL EVERY 6 HOURS PRN
Status: DISCONTINUED | OUTPATIENT
Start: 2020-03-01 | End: 2020-03-20 | Stop reason: HOSPADM

## 2020-03-01 RX ORDER — MONTELUKAST SODIUM 10 MG/1
10 TABLET ORAL NIGHTLY
Status: DISCONTINUED | OUTPATIENT
Start: 2020-03-01 | End: 2020-03-20 | Stop reason: HOSPADM

## 2020-03-01 RX ORDER — SODIUM CHLORIDE 0.9 % (FLUSH) 0.9 %
10 SYRINGE (ML) INJECTION PRN
Status: DISCONTINUED | OUTPATIENT
Start: 2020-03-01 | End: 2020-03-20 | Stop reason: HOSPADM

## 2020-03-01 RX ORDER — ACETAMINOPHEN 650 MG/1
650 SUPPOSITORY RECTAL EVERY 6 HOURS PRN
Status: DISCONTINUED | OUTPATIENT
Start: 2020-03-01 | End: 2020-03-05

## 2020-03-01 RX ORDER — TRAMADOL HYDROCHLORIDE 50 MG/1
50 TABLET ORAL EVERY 6 HOURS PRN
Status: DISCONTINUED | OUTPATIENT
Start: 2020-03-01 | End: 2020-03-12

## 2020-03-01 RX ORDER — FLUTICASONE PROPIONATE 110 UG/1
2 AEROSOL, METERED RESPIRATORY (INHALATION) 2 TIMES DAILY
Status: DISCONTINUED | OUTPATIENT
Start: 2020-03-01 | End: 2020-03-20 | Stop reason: HOSPADM

## 2020-03-01 RX ORDER — SODIUM CHLORIDE 0.9 % (FLUSH) 0.9 %
10 SYRINGE (ML) INJECTION EVERY 12 HOURS SCHEDULED
Status: DISCONTINUED | OUTPATIENT
Start: 2020-03-01 | End: 2020-03-20 | Stop reason: HOSPADM

## 2020-03-01 RX ORDER — DULOXETIN HYDROCHLORIDE 60 MG/1
60 CAPSULE, DELAYED RELEASE ORAL DAILY
Status: DISCONTINUED | OUTPATIENT
Start: 2020-03-01 | End: 2020-03-02 | Stop reason: SDUPTHER

## 2020-03-01 RX ORDER — PROPAFENONE HYDROCHLORIDE 150 MG/1
150 TABLET, FILM COATED ORAL EVERY 8 HOURS SCHEDULED
Status: DISCONTINUED | OUTPATIENT
Start: 2020-03-01 | End: 2020-03-20 | Stop reason: HOSPADM

## 2020-03-01 RX ORDER — ALBUTEROL SULFATE 90 UG/1
2 AEROSOL, METERED RESPIRATORY (INHALATION) EVERY 6 HOURS PRN
Status: DISCONTINUED | OUTPATIENT
Start: 2020-03-01 | End: 2020-03-20 | Stop reason: HOSPADM

## 2020-03-01 RX ADMIN — IPRATROPIUM BROMIDE AND ALBUTEROL SULFATE 3 ML: 2.5; .5 SOLUTION RESPIRATORY (INHALATION) at 11:26

## 2020-03-01 RX ADMIN — IPRATROPIUM BROMIDE AND ALBUTEROL SULFATE 3 ML: 2.5; .5 SOLUTION RESPIRATORY (INHALATION) at 20:00

## 2020-03-01 RX ADMIN — CARBIDOPA AND LEVODOPA 1 TABLET: 25; 100 TABLET ORAL at 14:32

## 2020-03-01 RX ADMIN — MORPHINE SULFATE 4 MG: 4 INJECTION, SOLUTION INTRAMUSCULAR; INTRAVENOUS at 02:12

## 2020-03-01 RX ADMIN — LAMOTRIGINE 50 MG: 25 TABLET ORAL at 22:23

## 2020-03-01 RX ADMIN — PROPAFENONE HYDROCHLORIDE 150 MG: 150 TABLET, FILM COATED ORAL at 22:23

## 2020-03-01 RX ADMIN — LAMOTRIGINE 50 MG: 25 TABLET ORAL at 09:47

## 2020-03-01 RX ADMIN — METOPROLOL TARTRATE 50 MG: 50 TABLET, FILM COATED ORAL at 02:12

## 2020-03-01 RX ADMIN — Medication 10 ML: at 22:22

## 2020-03-01 RX ADMIN — HYDRALAZINE HYDROCHLORIDE 25 MG: 25 TABLET, FILM COATED ORAL at 02:12

## 2020-03-01 RX ADMIN — IPRATROPIUM BROMIDE AND ALBUTEROL SULFATE 3 ML: 2.5; .5 SOLUTION RESPIRATORY (INHALATION) at 15:44

## 2020-03-01 RX ADMIN — TRAMADOL HYDROCHLORIDE 100 MG: 50 TABLET, FILM COATED ORAL at 14:33

## 2020-03-01 RX ADMIN — Medication 10 ML: at 09:53

## 2020-03-01 RX ADMIN — NIFEDIPINE 60 MG: 30 TABLET, FILM COATED, EXTENDED RELEASE ORAL at 09:46

## 2020-03-01 RX ADMIN — METOPROLOL TARTRATE 50 MG: 50 TABLET, FILM COATED ORAL at 22:22

## 2020-03-01 RX ADMIN — ATORVASTATIN CALCIUM 20 MG: 10 TABLET, FILM COATED ORAL at 22:23

## 2020-03-01 RX ADMIN — HYDRALAZINE HYDROCHLORIDE 25 MG: 25 TABLET, FILM COATED ORAL at 09:46

## 2020-03-01 RX ADMIN — CARBIDOPA AND LEVODOPA 1 TABLET: 25; 100 TABLET ORAL at 09:47

## 2020-03-01 RX ADMIN — MORPHINE SULFATE 4 MG: 4 INJECTION, SOLUTION INTRAMUSCULAR; INTRAVENOUS at 09:52

## 2020-03-01 RX ADMIN — TRAMADOL HYDROCHLORIDE 50 MG: 50 TABLET, FILM COATED ORAL at 06:53

## 2020-03-01 RX ADMIN — IPRATROPIUM BROMIDE AND ALBUTEROL SULFATE 3 ML: 2.5; .5 SOLUTION RESPIRATORY (INHALATION) at 07:22

## 2020-03-01 RX ADMIN — Medication 2 PUFF: at 07:33

## 2020-03-01 RX ADMIN — PROPAFENONE HYDROCHLORIDE 150 MG: 150 TABLET, FILM COATED ORAL at 14:32

## 2020-03-01 RX ADMIN — MONTELUKAST SODIUM 10 MG: 10 TABLET, COATED ORAL at 22:23

## 2020-03-01 RX ADMIN — MORPHINE SULFATE 2 MG: 2 INJECTION, SOLUTION INTRAMUSCULAR; INTRAVENOUS at 00:24

## 2020-03-01 RX ADMIN — METOPROLOL TARTRATE 50 MG: 50 TABLET, FILM COATED ORAL at 09:46

## 2020-03-01 RX ADMIN — Medication 2 PUFF: at 20:00

## 2020-03-01 RX ADMIN — HYDRALAZINE HYDROCHLORIDE 25 MG: 25 TABLET, FILM COATED ORAL at 22:23

## 2020-03-01 RX ADMIN — LAMOTRIGINE 50 MG: 25 TABLET ORAL at 02:12

## 2020-03-01 RX ADMIN — SODIUM CHLORIDE 500 ML: 9 INJECTION, SOLUTION INTRAVENOUS at 00:24

## 2020-03-01 RX ADMIN — CARBIDOPA AND LEVODOPA 1 TABLET: 25; 100 TABLET ORAL at 22:23

## 2020-03-01 RX ADMIN — PROPAFENONE HYDROCHLORIDE 150 MG: 150 TABLET, FILM COATED ORAL at 06:53

## 2020-03-01 RX ADMIN — MORPHINE SULFATE 4 MG: 4 INJECTION, SOLUTION INTRAMUSCULAR; INTRAVENOUS at 17:51

## 2020-03-01 RX ADMIN — FUROSEMIDE 40 MG: 40 TABLET ORAL at 09:47

## 2020-03-01 ASSESSMENT — PAIN DESCRIPTION - LOCATION
LOCATION: RIB CAGE

## 2020-03-01 ASSESSMENT — PAIN DESCRIPTION - ORIENTATION
ORIENTATION: RIGHT

## 2020-03-01 ASSESSMENT — PAIN DESCRIPTION - FREQUENCY
FREQUENCY: CONTINUOUS

## 2020-03-01 ASSESSMENT — PAIN - FUNCTIONAL ASSESSMENT: PAIN_FUNCTIONAL_ASSESSMENT: PREVENTS OR INTERFERES SOME ACTIVE ACTIVITIES AND ADLS

## 2020-03-01 ASSESSMENT — PAIN DESCRIPTION - PAIN TYPE
TYPE: ACUTE PAIN

## 2020-03-01 ASSESSMENT — PAIN SCALES - GENERAL
PAINLEVEL_OUTOF10: 5
PAINLEVEL_OUTOF10: 9
PAINLEVEL_OUTOF10: 0
PAINLEVEL_OUTOF10: 10
PAINLEVEL_OUTOF10: 0
PAINLEVEL_OUTOF10: 4
PAINLEVEL_OUTOF10: 7
PAINLEVEL_OUTOF10: 7
PAINLEVEL_OUTOF10: 3

## 2020-03-01 ASSESSMENT — PAIN DESCRIPTION - ONSET
ONSET: ON-GOING
ONSET: ON-GOING

## 2020-03-01 ASSESSMENT — PAIN DESCRIPTION - DESCRIPTORS
DESCRIPTORS: SHARP;ACHING
DESCRIPTORS: ACHING;DISCOMFORT
DESCRIPTORS: ACHING;DISCOMFORT

## 2020-03-01 ASSESSMENT — PAIN DESCRIPTION - PROGRESSION: CLINICAL_PROGRESSION: NOT CHANGED

## 2020-03-01 NOTE — ED NOTES
0015-perfect serve to Dr. Claudia De Anda (hospitalist) at this time for admission consult per Travis Hurtado [facility]       Burnie Leisure  03/01/20 0016

## 2020-03-01 NOTE — ED NOTES
Attempted to get pt up out of bed to stand by walker, pt could not get up, even to a sitting position.      Dominic Vasquez RN  02/29/20 2016

## 2020-03-01 NOTE — PROGRESS NOTES
Morning assessment complete and medications given. Patient c/o pain  10/10 with movement. Patient makes limited movements while up in the chair. This nurse encouraged patient to take deep breathes. Patient denies any additional needs at this time. Call light within reach. Will continue to monitor. Bed check on for patient safety.  Abigail Patton

## 2020-03-01 NOTE — PROGRESS NOTES
Pt up to [facility] at this time. Tolerated ambulation okay with assist X2. Does need extra time and does cause a lot of pain. Prn meds given. Pt denies other needs. Will monitor.

## 2020-03-01 NOTE — H&P
Hospital Medicine History & Physical      PCP: Joel Breaux MD    Date of Admission: 2/29/2020    Date of Service: Pt seen/examined on 3/1/2020    Chief Complaint:    Chief Complaint   Patient presents with   Opal Reynoso     pt states \"I have Parkinsons and I was walking into the garage to get into the car and my legs gave out on me\" pt's  states pt fell onto her butt and her right side hit into the car, pt c/o right ribs pain, right shoulder pain, and right clavicle pain, pt states she hit her head but did not have LOC, pt takes eliquis and baby aspirin dailiy          History Of Present Illness: The patient is a 70 y.o. female with a PMH of Parkinson's Disease, Asthma, PAF, CKD stage III, DM controlled with diet, HTN, HLD who presented to the ED with complaint of mechanical fall while ambulating in the garage to her car. Pt's legs gave out and she fell down and landed on her back. She did hit a part of the car upon falling and now c/o pain in the right posterolateral chest wall. She did hit her head. No loss of consciousness. Denies dizziness, chest pain, SOB or palpitations. Pt was unable to ambulate following the fall 2/2 pain & weakness. Work up in the ED showed a small effusion on the right poss related to atelectasis. CT cervical spine and head were negative. Pt was admitted to med surg for pain control and monitoring.     Past Medical History:        Diagnosis Date    Anemia, unspecified 2010    neg bone marrow    Asthma     Atrial fibrillation ([address]     Baker's cyst     Blind left eye     Carotid artery stenoses     Carotid stenosis 2002    left    Cervical spinal cord compression ([address] 11/2010    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-sided sciatica 8/9/2016    CKD (chronic kidney disease) stage 3, GFR 30-59 ml/min ([facility])     Community acquired pneumonia of left lower lobe of lung ([address] 9/8/2018    CRI     Detached retina, left     Diverticulosis     DJD (degenerative joint supp O2, painful w/ inspiration  CV: Regular rate. Regular rhythm. No murmur. No rub. Bilateral pitting 2-3+ edema - baseline per spouse  GI: Non-tender. Non-distended. Skin: Warm and dry. No nodule on exposed extremities. No rash on exposed extremities. Small area of ecchymosis in right posterolateral chest wall  M/S: No cyanosis. No joint deformity. No clubbing. TTP along right posterior/lateral ribs  Neuro: Awake. Grossly nonfocal    Psych: No anxiety or agitation. CBC:   Recent Labs     02/29/20 2245   WBC 15.3*   HGB 10.3*   HCT 32.5*   MCV 87.1        BMP:   Recent Labs     02/29/20 2245   *   K 5.3*   CL 93*   CO2 26   BUN 31*   CREATININE 2.1*     LIVER PROFILE:   Recent Labs     02/29/20 2245   AST 23   ALT <5*   BILITOT <0.2   ALKPHOS 119     U/A:    Lab Results   Component Value Date    NITRITE neg 03/09/2011    COLORU Yellow 05/22/2019    WBCUA >100 05/22/2019    RBCUA 0-2 05/22/2019    MUCUS 3+ 02/27/2017    BACTERIA 4+ 05/22/2019    CLARITYU SL CLOUDY 05/22/2019    SPECGRAV 1.025 05/22/2019    LEUKOCYTESUR MODERATE 05/22/2019    BLOODU Negative 05/22/2019    GLUCOSEU Negative 05/22/2019       ABG    Lab Results   Component Value Date    ZSM3IZP 27.1 04/25/2017    BEART 0.0 04/25/2017    I8EWAARW 96.8 04/25/2017    PHART 7.296 04/25/2017    VXC7ZKS 56.8 04/25/2017    PO2ART 95.8 04/25/2017    NVM7ZEH 28.8 04/25/2017       CULTURES  N/a    EKG:  I have reviewed the EKG with the following interpretation:   3/1/2020  Normal sinus rhythm rate of 70  Normal ECG  No previous ECGs available     RADIOLOGY    CT CHEST WO CONTRAST   Final Result   Dependent airspace disease bilaterally, right greater than left. Associated   small effusion on the right is noted. Findings may be related to   atelectasis. A component of contusion in the right lung base would be   difficult to exclude given the history of recent trauma. No pneumothorax.       Small mediastinal and retrocrural nodes CT Cervical Spine WO Contrast   Final Result   No acute abnormality of the cervical spine. CT Head WO Contrast   Final Result   No acute intracranial abnormality. XR CHEST PORTABLE   Final Result   No acute process. Pertinent previous results reviewed     TTE 9/26/2018  Summary   Normal left ventricular systolic function with ejection fraction of 55-60%. No regional wall motion abnormalites are seen. Mild concentric left ventricular hypertrophy. Grade I diastolic dysfunction with normal filling pressure. Mild to moderate mitral regurgitation. The left atrium is mildly dilated. Moderate tricuspid regurgitation. Systolic pulmonic artery pressure (SPAP) is estimated at 51 mmHg consistent   with mild pulmonary hypertension (Right atrial pressure of 3 mmHg). Compared to previous study from 3- no changes noted. Milton Leigh have reviewed the chart on [facility] and personally interviewed and examined patient, reviewed the data (labs and imaging) and after discussion with my PA formulated the plan. Agree with note with the following edits. HPI:   Patient is a very pleasant 72-year-old white female who has Parkinson's disease for at least the last 2 years. She also has a history of paroxysmal A. fib, CKD stage III, diabetes, asthma, hypertension and hyperlipidemia. She does get dizzy and has falls at home. She had a mechanical fall while ambulating in the grass to her car. Her legs apparently gave out and she landed on her back. There is no loss of consciousness. She had a part of the car upon falling. She had pain in the right posterior chest wall. She hit her head. Patient is unable to ambulate sick cane due to pain and weakness after the fall. In the ED she had a CT of the C-spine of the head which were both negative. She continues to be in pain and is requiring IV pain medicine.     I reviewed the patient's Past Medical History, Past Surgical History, Medications, and Allergies. Physical exam:    /81   Pulse 67   Temp 98.1 °F (36.7 °C) (Oral)   Resp 16   Ht 5' 6\" (1.676 m)   Wt 259 lb (117.5 kg)   SpO2 96%   BMI 41.80 kg/m²     Gen: No distress. Alert. Eyes: PERRL. No sclera icterus. No conjunctival injection. ENT: No discharge. Pharynx clear. Neck: Trachea midline. Normal thyroid. Resp: No accessory muscle use. No crackles. No wheezes. No rhonchi. No dullness on percussion. CV: Regular rate. Regular rhythm. No murmur or rub. No edema. Gait not tested she is a two-person assist       ASSESSMENT/PLAN:    Fall  - CT head neg, CT cervical spine neg  - Admit to Med Surg  - fall precautions  - IS, PRN morphine and tramadol  - PT/OT - recommending ARU    Right Posterior Rib Pain  Pleural Effusion - Right  - poss atelectasis vs contusion? No hemoptysis  - eliquis held, IS  - monitor hemoglobin - 10.3 on arrival.  If hemoglobin is stable resume Eliquis in the morning    Hyperkalemia  - 5.3, hold aldactone  - repeat BMP     Mild FARHEEN on CKD stage III  - baseline Cr ~ 1.-1.7  - Cr on admission 2.1  - s/p 500 cc IVF in ED  - hold Lasix for now, repeat BMP    Leukocytosis  - 15.3, appears chronically elevated  - monitor    PAF  - regular rate and rhythm on exam  - cont BB, Rythmol  - AC held 2/2 fall, CXR with effusion, poss contusion? Chronic Diastolic CHF  - appears compensated  - low Na diet, I&Os, daily weights  - cont BB, held Lasix and  Aldactone for now    Type II DM  - previous hx, not on diabetic meds  - last Hgb A1c: 6.4 on 1/24/20  - controlled with diet, carb control    HTN  - stable  - cont Hydralazine, Lopressor, Nifedipine; lasix held for now & HCTZ    HLD  - cont Lipitor    COPD  - no AE  - cont HHN    Chronic Normocytic Anemia  - Hgb 10.3  - stable, monitor     Parkinson's Disease  - cont Sinemet    Seizure DO  - seizure precautions  - cont Lamictal    Morbid Obesity  - Body mass index is 41.8 kg/m².   - Complicating assessment and treatment. Placing patient at risk for multiple co-morbidities as well as early death and contributing to the patient's presentation.   - Counseled on weight loss.      DVT Prophylaxis: SCDs  Diet: DIET CARDIAC;  Code Status: Full Code    Donald Kelly PA-C 11:12 AM 3/1/2020       Devika Lewis 3/1/2020 11:33 AM

## 2020-03-01 NOTE — PROGRESS NOTES
RESPIRATORY THERAPY ASSESSMENT    Name:  [address] [place] [address] Record Number:  [id number]  Age: 70 y.o. Gender: female  : 1940  Today's Date:  3/1/2020  Room:  [facility]8/0228-01    Assessment     Is the patient being admitted for a COPD or Asthma exacerbation? No   (If yes the patient will be seen every 4 hours for the first 24 hours and then reassessed)    Patient Admission Diagnosis      Allergies  Allergies   Allergen Reactions    Levaquin [Levofloxacin]      Pt was given in er and developed hives and redness    Pyridium [Phenazopyridine Hcl]     Reglan [Metoclopramide Hcl]     Reglan [Metoclopramide Hcl]      tremors       Minimum Predicted Vital Capacity:     884          Actual Vital Capacity:      410              Pulmonary History:COPD and Asthma, BETSY, PNA. Home Oxygen Therapy:  2 liters/min via BIPAP  Home Respiratory Therapy:Albuterol, Budesonide and Salmeterol/Fluticasone Propionate, Duoneb   Current Respiratory Therapy:  Duoneb Q6PRN,  Albuterol 2PUFFS Q6PRN,  Pulmicort 2puffs BID. Respiratory Severity Index(RSI)   Patients with orders for inhalation medications, oxygen, or any therapeutic treatment modality will be placed on Respiratory Protocol. They will be assessed with the first treatment and at least every 72 hours thereafter. The following severity scale will be used to determine frequency of treatment intervention.     Smoking History: Pulmonary Disease or Smoking History, Greater than 15 pack year = 2    Social History  Social History     Tobacco Use    Smoking status: Never Smoker    Smokeless tobacco: Never Used   Substance Use Topics    Alcohol use: No     Alcohol/week: 0.0 standard drinks    Drug use: No       Recent Surgical History: None = 0  Past Surgical History  Past Surgical History:   Procedure Laterality Date    APPENDECTOMY      CAROTID ENDARTERECTOMY      left    CATARACT REMOVAL      CERVICAL FUSION  2010    CHOLECYSTECTOMY      CORNEAL TRANSPLANT      EYE SURGERY      HERNIA REPAIR      HYSTERECTOMY      JOINT REPLACEMENT Bilateral 2003    KNEE SURGERY      replacement x 2    SHOULDER SURGERY      TONSILLECTOMY      TUBAL LIGATION      VITRECTOMY         Level of Consciousness: Alert, Oriented, and Cooperative = 0    Level of Activity: Mostly sedentary, minimal walking = 2    Respiratory Pattern: Dyspnea with exertion;Irregular pattern;or RR less than 6 = 2    Breath Sounds: Absent bilaterally and/or with wheezes = 3    Sputum   ,  ,    Cough: Strong, spontaneous, non-productive = 0    Vital Signs   BP (!) 147/69   Pulse 68   Temp 97.5 °F (36.4 °C) (Oral)   Resp 20   Ht 5' 6\" (1.676 m)   Wt 259 lb (117.5 kg)   SpO2 96%   BMI 41.80 kg/m²   SPO2 (COPD values may differ): 88-89% on room air or greater than 92% on FiO2 28- 35% = 2    Peak Flow (asthma only): not applicable = 0    RSI: 53-74 = Q6H or QID and Q4HPRN for dyspnea        Plan       Goals: medication delivery    Patient/caregiver was educated on the proper method of use for Respiratory Care Devices:        Level of patient/caregiver understanding able to:   ? Verbalize understanding   ? Demonstrate understanding       ? Teach back        ? Needs reinforcement       ? No available caregiver               ? Other:     Response to education:       Is patient being placed on Home Treatment Regimen? No     Does the patient have everything they need prior to discharge? NA     Comments: Chart reviewed and patient assessed. Plan of Care: Duoneb QID,  Albuterol 2puffs Q6PRN,  Pulmicort 2PUFFS BID. Electronically signed by Adeline Krishnamurthy RCP on 3/1/2020 at 3:37 AM    Respiratory Protocol Guidelines     1. Assessment and treatment by Respiratory Therapy will be initiated for medication and therapeutic interventions upon initiation of aerosolized medication. 2. Physician will be contacted for respiratory rate (RR) greater than 35 breaths per minute.  Therapy will be held for clear them with a cough. Anti-inflammatory and Combination Medications:    1. If the patient lacks prior history of lung disease, is not using inhaled anti-inflammatory medication at home, and lacks wheezing by examination or by history for at least 24 hours, contact physician for possible discontinuation.

## 2020-03-01 NOTE — PROGRESS NOTES
Handoff report and transfer of care given at bedside to [facility]. Patient in stable condition, denies needs/concerns at this time. Call light within reach.

## 2020-03-01 NOTE — PROGRESS NOTES
Admission assessment completed. Pt is alert and oriented but does have some forgetfulness. Respirations are labored with audible wheezing heard from bedside. Only diminished lungs sounds throughout. Bowel sounds present. Pt . No complaints voiced. Pt pain is controlled at this time but does hurt when she moves. Pt denies needs at this time. SR up x 2 and bed in low position. Call light is within reach. Will monitor.

## 2020-03-01 NOTE — PROGRESS NOTES
4 Eyes Skin Assessment     The patient is being assess for   Low Vineet    I agree that 2 RN's have performed a thorough Head to Toe Skin Assessment on the patient. ALL assessment sites listed below have been assessed. Areas assessed by both nurses:   [x]   Head, Face, and Ears   [x]   Shoulders, Back, and Chest, Abdomen  [x]   Arms, Elbows, and Hands   [x]   Coccyx, Sacrum, and Ischium  [x]   Legs, Feet, and Heels    Large bruise on right flank, discoloration noted on right buttocks healing wound per , intergluteal cleft small stage II noted. Dry flaky skin noted on BLE.     **SHARE this note so that the co-signing nurse is able to place an eSignature**    Co-signer eSignature: Electronically signed by Naye Ortega RN on 3/1/20 at 6:39 PM    Does the Patient have Skin Breakdown?   Yes LDA WOUND CARE was Initiated documentation include the Annmarie-wound, Wound Assessment, Measurements, Dressing Treatment, Drainage, and Color\",          Vineet Prevention initiated:  Yes   Wound Care Orders initiated:  Yes      [id number]  nurse consulted for Pressure Injury (Stage 3,4, Unstageable, DTI, NWPT, Complex wounds)and New or Established Ostomies:  No      Primary Nurse eSignature: Electronically signed by Maye Wright RN on 3/1/20 at 2:54 PM

## 2020-03-01 NOTE — PROGRESS NOTES
Inpatient Physical Therapy Evaluation and Treatment    Unit: [facility]  Date:  3/1/2020  Patient Name:    Gayla Harris  Admitting diagnosis:  Intractable pain [R52]  Admit Date:  2/29/2020  Precautions/Restrictions/WB Status/ Lines/ Wounds/ Oxygen: fall risk, bed/chair alarm and supplemental O2 (2L)    Treatment Time:  3134-8275  Treatment Number:  1   Timed Code Treatment Minutes: 15 minutes  Total Treatment Minutes:  30  minutes    Patient Goals for Therapy: \" I am in so much pain. To be up and walking again without pain \"          Discharge Recommendations: ARU  DME needs for discharge: RW       Therapy recommendation for EMS Transport: can transport by wheelchair    Therapy recommendations for staff:   Assist of 2 with use of rolling walker (RW) for all ambulation to/from BSC/chair    History of Present Illness: Pt with  Hx of PD was attempting to transfer to her car in her garage when she states her legs gave out and she fell, hitting her side on the running board of her car. Severe pain. Was attempting to go to dinner for her grandsons bd. Home Health S4 Level Recommendation:  NA  AM-PAC Mobility Score  10          Preadmission Environment    Pt. Lives with spouse  Home environment:    one story home  Steps to enter first floor:   1 steps to enter and no Rail            Steps to second floor: N/A  Bathroom:       Walk-in Shower; Handicap height toilet; Built-in shower seat, Grab bars in shower, Hand-held shower, one grab bar across from toilet  Equipment owned:      [employer], 4 wheeled walker, Reacher, Sock aid, Grab bars, Lift chair (home bipap at night, floor to ceiling bar next to bed to assist with transfers, grab bars on the walls around the bed)      Preadmission Status / PLOF:  History of falls             Yes  (Reason for admission, reports legs gave out on her)   Pt. Able to drive          No   Pt Fully independent with ADL's         Yes   Pt.  Required assistance from family for:  Cooking, Cleaning and Laundry  ( completes)   Pt. Fully independent for transfers and gait and walked with: Rollator (uses 4WW most of time \"unless I forget and my  reminds me\")  Occupation: Retired  Type of occupation: Worked part-time for [employer] as   Leisure & Hobbies: Riding motorcycle tricycle with       Pain  Pt reports pain at 1-2 at rest.   Pain   Yes  Location: Right ribs  Ratin /10 with movement  Pain Medicine Status: RN notified    Cognition    A&O x4   Able to follow 2 step commands    Subjective  Patient lying supine in bed with no family present  Pt agreeable to this PT eval & tx. Upper Extremity ROM/Strength  Please see OT evaluation. Lower Extremity ROM / Strength    AROM WFL: Yes  ROM limitations: PMH of bilat TKR    LE strength grossly 3 to 3+/5 bilat     Lower Extremity Sensation    WFL    Lower Extremity Proprioception:   WFL    Coordination and Tone  WNL    Balance  Static Sitting:  Good -    Tolerance: pPinful  Dynamic Sitting:  Good -   Static Standing: Fair +   Tolerance: Painful  Dynamic Standing: Fair     Bed Mobility   Supine to Sit:   Mod A  Sit to Supine:  Not Tested  Rolling: Max A  Scooting at EOB: Mod A  Scooting to [facility]:  Not Tested    Transfer Training     Sit to stand:   Min A of 2  Stand to sit:   Min A of 2  Bed to Chair:  MIN A of 2 with use of rolling walker    Gait gait deferred due to difficulty with transfers; pt ambulated 0 ft. Distance: 5 turning steps    Stair Training NT    Activity Tolerance 96% at rest.     Positioning Needs   Pt sitting up in chair    Exercises Initiated    AP ; AAROM shoulder flexion       Patient/Family Education   Pt educated on role of inpatient PT, POC, importance of continued activity, HEP and calling for assist with mobility. Assessment  Pt seen for Physical Therapy evaluation in acute care setting.   Pt demonstrated decreased Activity tolerance, Balance, ROM and Safety and decreased independence with Ambulation, Bed Mobility  and Transfers. Goals : To be met in 3 visits:  1). Independent with LE Ex x 10 reps    To be met in 6 visits:  1). Supine to/from sit: MIN A  2). Sit to/from stand: Min A  3). Bed to chair: Min A  4). Gait: Ambulate 50 ft.  with  Min A  and use of rolling walker (RW)  5). Tolerate B LE exercises 3 sets of 10-15 reps      Rehabilitation Potential: Good  Strengths for achieving goals include:   Cooperative   Barriers to achieving goals include:    pain    Plan    To be seen 5x / week  while in acute care setting for therapeutic exercises, bed mobility, transfers, progressive gait training, balance training, and family/patient education. Shauna Mercado, PT, MPT, OMT-c [id number]    If patient discharges from this facility prior to next visit, this note will serve as the Discharge Summary.

## 2020-03-01 NOTE — PROGRESS NOTES
4 Eyes Skin Assessment     The patient is being assess for   Admission    I agree that 2 RN's have performed a thorough Head to Toe Skin Assessment on the patient. ALL assessment sites listed below have been assessed. Areas assessed by both nurses:   [x]   Head, Face, and Ears   [x]   Shoulders, Back, and Chest, Abdomen  [x]   Arms, Elbows, and Hands   [x]   Coccyx, Sacrum, and Ischium  [x]   Legs, Feet, and Heels        Callous to right buttock r/t old ulcer, scattered bruising, tear to left hand    **SHARE this note so that the co-signing nurse is able to place an eSignature**    Co-signer eSignature: {Esignature:[id number]}    Does the Patient have Skin Breakdown?   No          Vineet Prevention initiated:  Yes   Wound Care Orders initiated:  No      [facility] nurse consulted for Pressure Injury (Stage 3,4, Unstageable, DTI, NWPT, Complex wounds)and New or Established Ostomies:  No      Primary Nurse eSignature: Electronically signed by Kitty Bashir RN on 3/1/20 at 6:59 AM

## 2020-03-01 NOTE — PROGRESS NOTES
Inpatient Occupational Therapy  Evaluation and Treatment    Unit: [address] [place]  Date:  3/1/2020  Patient Name:    Regan Marcus  Admitting diagnosis:  Intractable pain [R52]  Admit Date:  2/29/2020  Precautions/Restrictions/WB Status/ Lines/ Wounds/ Oxygen: fall risk, IV, bed/chair alarm and supplemental O2 (2L)    Treatment Time:  395-377  Treatment Number: 1   Billable Treatment Time: 30 minutes (loss of 15 min due to split with PT)   Total Treatment Time:   55  minutes    Patient Goals for Therapy:  \" to go home \"      Discharge Recommendations: Acute Rehab (ARU)/ Inpatient Rehab Facility (IRF)  DME needs for discharge: Needs Met       Therapy recommendations for staff:  Assist of 2 (minimal assist) with use of rolling walker (RW) for all transfers to/from BSC/chair    History of Present Illness: 70 y.o. female for mechanical fall. Onset was today. Duration has been since the onset. Context includes pt states she has parkinson and is unsteady at baseline. She reports she was going to the car today and her legs gave out, she fell and hit her head and right posterior rib cage. Pt denies any light headedness or dizziness. Alleviating factors include nothing. PMHx: anemia, asthma, a fib, bakers cyst, blind in L eye, carotid stenosis, cervical spinal cord compression, chronic midline low back pain with R sided sciatica, CKD stage 3, CRI, detached retina L, diverticulosis, DJD, chronic edema, fatty liver, GERD, herniated lumbar intervertebral disc, hx of stage 2 decubitus ulcer, hyperlipidemia, HTN, BETSY with BiPAP, partial symptomatic epilepsy with complex partial seizures, RLS,  Tremor, Type 2 DM    Home Health S4 Level Recommendation:  NA  AM-PAC Score: 14  Stacie Hatchet scored a 14/24 on the AM-PAC ADL Inpatient form. Current research shows that an AM-PAC score of 17 or less is typically not associated with a discharge to the patient's home setting.  Based on the patient's AM-PAC ADL score and their current acute care setting for therapeutic exercises, bed mobility, transfers, dressing, bathing, family/patient education with adaptive equipment, breathing technique instruction.      Cuauhtemoc Toure, BRUNO, OTR/L   [id number]           If patient discharges from this facility prior to next visit, this note will serve as the Discharge Summary

## 2020-03-01 NOTE — PROGRESS NOTES
Fall, hx of parkinson's. Intractable chest wall pain with ?  Contusion   - On eliquis, will hold tonight   - Pain control   - PTOT eval prior to d/c   - Check procal given CT findings

## 2020-03-02 LAB
ANION GAP SERPL CALCULATED.3IONS-SCNC: 12 MMOL/L (ref 3–16)
BASOPHILS ABSOLUTE: 0 K/UL (ref 0–0.2)
BASOPHILS RELATIVE PERCENT: 0.3 %
BUN BLDV-MCNC: 32 MG/DL (ref 7–20)
CALCIUM SERPL-MCNC: 9.1 MG/DL (ref 8.3–10.6)
CHLORIDE BLD-SCNC: 93 MMOL/L (ref 99–110)
CO2: 26 MMOL/L (ref 21–32)
CREAT SERPL-MCNC: 2 MG/DL (ref 0.6–1.2)
EOSINOPHILS ABSOLUTE: 0.1 K/UL (ref 0–0.6)
EOSINOPHILS RELATIVE PERCENT: 1.2 %
GFR AFRICAN AMERICAN: 29
GFR NON-AFRICAN AMERICAN: 24
GLUCOSE BLD-MCNC: 104 MG/DL (ref 70–99)
GLUCOSE BLD-MCNC: 108 MG/DL (ref 70–99)
HCT VFR BLD CALC: 31.9 % (ref 36–48)
HEMOGLOBIN: 10.1 G/DL (ref 12–16)
LYMPHOCYTES ABSOLUTE: 1 K/UL (ref 1–5.1)
LYMPHOCYTES RELATIVE PERCENT: 9 %
MCH RBC QN AUTO: 28.2 PG (ref 26–34)
MCHC RBC AUTO-ENTMCNC: 31.7 G/DL (ref 31–36)
MCV RBC AUTO: 88.9 FL (ref 80–100)
MONOCYTES ABSOLUTE: 1 K/UL (ref 0–1.3)
MONOCYTES RELATIVE PERCENT: 8.2 %
NEUTROPHILS ABSOLUTE: 9.5 K/UL (ref 1.7–7.7)
NEUTROPHILS RELATIVE PERCENT: 81.3 %
PDW BLD-RTO: 14 % (ref 12.4–15.4)
PERFORMED ON: ABNORMAL
PLATELET # BLD: 268 K/UL (ref 135–450)
PMV BLD AUTO: 8.3 FL (ref 5–10.5)
POTASSIUM REFLEX MAGNESIUM: 4.8 MMOL/L (ref 3.5–5.1)
RBC # BLD: 3.59 M/UL (ref 4–5.2)
SODIUM BLD-SCNC: 131 MMOL/L (ref 136–145)
WBC # BLD: 11.7 K/UL (ref 4–11)

## 2020-03-02 PROCEDURE — 80048 BASIC METABOLIC PNL TOTAL CA: CPT

## 2020-03-02 PROCEDURE — 99232 SBSQ HOSP IP/OBS MODERATE 35: CPT | Performed by: INTERNAL MEDICINE

## 2020-03-02 PROCEDURE — 99223 1ST HOSP IP/OBS HIGH 75: CPT | Performed by: INTERNAL MEDICINE

## 2020-03-02 PROCEDURE — 94761 N-INVAS EAR/PLS OXIMETRY MLT: CPT

## 2020-03-02 PROCEDURE — 2580000003 HC RX 258: Performed by: INTERNAL MEDICINE

## 2020-03-02 PROCEDURE — 94640 AIRWAY INHALATION TREATMENT: CPT

## 2020-03-02 PROCEDURE — 1200000000 HC SEMI PRIVATE

## 2020-03-02 PROCEDURE — 2700000000 HC OXYGEN THERAPY PER DAY

## 2020-03-02 PROCEDURE — 6370000000 HC RX 637 (ALT 250 FOR IP): Performed by: INTERNAL MEDICINE

## 2020-03-02 PROCEDURE — 36415 COLL VENOUS BLD VENIPUNCTURE: CPT

## 2020-03-02 PROCEDURE — 85025 COMPLETE CBC W/AUTO DIFF WBC: CPT

## 2020-03-02 RX ORDER — DULOXETIN HYDROCHLORIDE 60 MG/1
60 CAPSULE, DELAYED RELEASE ORAL DAILY
Status: DISCONTINUED | OUTPATIENT
Start: 2020-03-03 | End: 2020-03-20 | Stop reason: HOSPADM

## 2020-03-02 RX ADMIN — PROPAFENONE HYDROCHLORIDE 150 MG: 150 TABLET, FILM COATED ORAL at 21:47

## 2020-03-02 RX ADMIN — TRAMADOL HYDROCHLORIDE 100 MG: 50 TABLET, FILM COATED ORAL at 15:22

## 2020-03-02 RX ADMIN — CARBIDOPA AND LEVODOPA 1 TABLET: 25; 100 TABLET ORAL at 21:34

## 2020-03-02 RX ADMIN — MONTELUKAST SODIUM 10 MG: 10 TABLET, COATED ORAL at 21:34

## 2020-03-02 RX ADMIN — DULOXETINE HYDROCHLORIDE 60 MG: 60 CAPSULE, DELAYED RELEASE ORAL at 15:23

## 2020-03-02 RX ADMIN — PROPAFENONE HYDROCHLORIDE 150 MG: 150 TABLET, FILM COATED ORAL at 15:22

## 2020-03-02 RX ADMIN — TRAMADOL HYDROCHLORIDE 100 MG: 50 TABLET, FILM COATED ORAL at 21:35

## 2020-03-02 RX ADMIN — CARBIDOPA AND LEVODOPA 1 TABLET: 25; 100 TABLET ORAL at 15:22

## 2020-03-02 RX ADMIN — PROPAFENONE HYDROCHLORIDE 150 MG: 150 TABLET, FILM COATED ORAL at 05:29

## 2020-03-02 RX ADMIN — NIFEDIPINE 60 MG: 30 TABLET, FILM COATED, EXTENDED RELEASE ORAL at 09:45

## 2020-03-02 RX ADMIN — ATORVASTATIN CALCIUM 20 MG: 10 TABLET, FILM COATED ORAL at 21:34

## 2020-03-02 RX ADMIN — Medication 10 ML: at 09:47

## 2020-03-02 RX ADMIN — LAMOTRIGINE 50 MG: 25 TABLET ORAL at 09:45

## 2020-03-02 RX ADMIN — HYDRALAZINE HYDROCHLORIDE 25 MG: 25 TABLET, FILM COATED ORAL at 09:45

## 2020-03-02 RX ADMIN — CARBIDOPA AND LEVODOPA 1 TABLET: 25; 100 TABLET ORAL at 09:45

## 2020-03-02 RX ADMIN — TRAMADOL HYDROCHLORIDE 50 MG: 50 TABLET, FILM COATED ORAL at 08:11

## 2020-03-02 RX ADMIN — LAMOTRIGINE 50 MG: 25 TABLET ORAL at 21:34

## 2020-03-02 RX ADMIN — Medication 2 PUFF: at 11:20

## 2020-03-02 RX ADMIN — TRAMADOL HYDROCHLORIDE 100 MG: 50 TABLET, FILM COATED ORAL at 00:05

## 2020-03-02 RX ADMIN — METOPROLOL TARTRATE 50 MG: 50 TABLET, FILM COATED ORAL at 09:45

## 2020-03-02 RX ADMIN — HYDRALAZINE HYDROCHLORIDE 25 MG: 25 TABLET, FILM COATED ORAL at 21:34

## 2020-03-02 RX ADMIN — Medication 10 ML: at 21:36

## 2020-03-02 RX ADMIN — IPRATROPIUM BROMIDE AND ALBUTEROL SULFATE 3 ML: 2.5; .5 SOLUTION RESPIRATORY (INHALATION) at 07:29

## 2020-03-02 RX ADMIN — IPRATROPIUM BROMIDE AND ALBUTEROL SULFATE 3 ML: 2.5; .5 SOLUTION RESPIRATORY (INHALATION) at 19:38

## 2020-03-02 RX ADMIN — IPRATROPIUM BROMIDE AND ALBUTEROL SULFATE 3 ML: 2.5; .5 SOLUTION RESPIRATORY (INHALATION) at 15:41

## 2020-03-02 RX ADMIN — METOPROLOL TARTRATE 50 MG: 50 TABLET, FILM COATED ORAL at 21:35

## 2020-03-02 RX ADMIN — IPRATROPIUM BROMIDE AND ALBUTEROL SULFATE 3 ML: 2.5; .5 SOLUTION RESPIRATORY (INHALATION) at 11:20

## 2020-03-02 RX ADMIN — Medication 2 PUFF: at 19:38

## 2020-03-02 ASSESSMENT — PAIN SCALES - GENERAL
PAINLEVEL_OUTOF10: 5
PAINLEVEL_OUTOF10: 8
PAINLEVEL_OUTOF10: 9
PAINLEVEL_OUTOF10: 7

## 2020-03-02 NOTE — CARE COORDINATION
Case Management Assessment  Initial Evaluation    Date/Time of Evaluation: 3/2/2020 3:55 PM  Assessment Completed by: Sherry Aceves    Patient Name: Ayana Dunlap  YOB: 1940  Diagnosis: Intractable pain [R52]  Intractable pain [R52]  Date / Time: 2/29/2020  4:03 PM  Admission status/Date:in-pt  Chart Reviewed: Yes      Patient Interviewed: Yes   Family Interviewed:  No      Hospitalization in the last 30 days:  No    Contacts  :     Relationship to Patient:   Phone Number:    Alternate Contact:     Relationship to Patient:     Phone Number:    Met with:    Current PCP  Charley Ramos MD      Financial  Medicare  Precert required for SNF : Y, N        3 night stay required - Y, N    ADLS  Support Systems: Children, Family Members, Spouse/Significant Other  Transportation: family    Meal Preparation: family    Housing  Home Environment: home with spouse  Steps: one  Plans to Return to Present Housing: Yes  Other Identified Issues: no    Home Care Information  Currently active with [address] : No  Type of Home Care Services: None  Passport/Waiver : No  :                      Phone Number:    Passport/Waiver Services: no          Durable Medical Equipment   DME Provider: Lanie  Equipment: Walker___Cane___RTS___ BSC___Shower Chair___  02_x_ at ____Liter(s)---Uses__hs only______HHN___ CPAP___ BiPap_x__ Hospital Bed___W/C____Other________      Has Home O2 in place on admit:  Yes, uses hs with BiPAP only  Informed of need to bring portable home O2 tank on day of discharge for nursing to connect prior to leaving:   Not Indicated  Verbalized agreement/Understanding:   Not Indicated    Community Service Affiliation  Dialysis:  No    · Name:  · Location  · Dialysis Schedule:  · Phone:   · Fax:     Outpatient PT/OT: No    Cancer Center: No     CHF Clinic: No     Pulmonary Rehab: No  Pain Clinic: No  Community Mental Health: No    Wound Clinic: No     Other:

## 2020-03-02 NOTE — PROGRESS NOTES
Spoke with MD, pt does have some notable crackles.  Asked for incentive spirometry, verbal order received

## 2020-03-02 NOTE — PROGRESS NOTES
Shift assessment completed. See flow sheet. Respiration easy, even and non labored. Pt hesitant to take deep breaths but encouraged to do so. Vital signs WNL. Pt alert and oriented x 4. Side rails up x 2. IV flushed without complications. Pt denies any needs at this time. Pt states her pain is okay/tolerable while resting, the pain increases with movement. Call light within reach. Will continue to monitor.

## 2020-03-02 NOTE — CONSULTS
Musculoskeletal: Negative for arthralgias   Skin: Negative for rash  Neurological: Negative for syncope  Hematological: Negative for adenopathy  Psychiatric/Behavorial: Negative for anxiety    PHYSICAL EXAM:  Blood pressure 137/69, pulse 83, temperature 97.7 °F (36.5 °C), resp. rate 16, height 5' 6\" (1.676 m), weight 259 lb (117.5 kg), SpO2 90 %, not currently breastfeeding.' on 2 L  Gen: No distress. Eyes: PERRL. No sclera icterus. No conjunctival injection. ENT: No discharge. Pharynx clear. Neck: Trachea midline. No obvious mass. Resp: No accessory muscle use. No crackles. No wheezes. No rhonchi. No dullness on percussion. Quite tender over the right lateral chest wall. Bruising underneath the lidocaine patch  CV: Regular rate. Regular rhythm. No murmur or rub. No edema. Peripheral pulses are 2+. Capillary refill is less than 3 seconds. GI: Non-tender. Non-distended. No hernia. Skin: Warm and dry. No nodule on exposed extremities. Lymph: No cervical LAD. No supraclavicular LAD. M/S: No cyanosis. No joint deformity. No clubbing. Neuro: Awake. Alert. Moves all four extremities. Psych: Oriented x 3. No anxiety. LABS:  CBC:   Recent Labs     02/29/20 2245 03/02/20  0507   WBC 15.3* 11.7*   HGB 10.3* 10.1*   HCT 32.5* 31.9*   MCV 87.1 88.9    268     BMP:   Recent Labs     02/29/20 2245 03/01/20  1352 03/02/20  0507   * 132* 131*   K 5.3* 5.1 4.8   CL 93* 95* 93*   CO2 26 26 26   BUN 31* 32* 32*   CREATININE 2.1* 2.3* 2.0*     LIVER PROFILE:   Recent Labs     02/29/20 2245   AST 23   ALT <5*   BILITOT <0.2   ALKPHOS 119     PT/INR: No results for input(s): PROTIME, INR in the last 72 hours. APTT: No results for input(s): APTT in the last 72 hours.   UA:No results for input(s): NITRITE, COLORU, PHUR, LABCAST, WBCUA, RBCUA, MUCUS, TRICHOMONAS, YEAST, BACTERIA, CLARITYU, SPECGRAV, LEUKOCYTESUR, UROBILINOGEN, BILIRUBINUR, BLOODU, GLUCOSEU, AMORPHOUS in the last 72

## 2020-03-02 NOTE — PROGRESS NOTES
Progress Note    Admit Date:  2/29/2020    Subjective:  Ms. Miracle Dominguez is complaining of right chest wall pain    Objective:   Patient Vitals for the past 4 hrs:   BP Temp Temp src Pulse Resp SpO2   03/02/20 0809 (!) 142/59 97.5 °F (36.4 °C) Oral 86 20 98 %            Intake/Output Summary (Last 24 hours) at 3/2/2020 1045  Last data filed at 3/1/2020 1823  Gross per 24 hour   Intake 460 ml   Output 100 ml   Net 360 ml       Physical Exam:    Gen: No distress. Alert. Morbidly obese  female, resting in bed, spouse at bedside  Head: no obvious abrasions/lacerations noted  Eyes:  No sclera icterus. No conjunctival injection. Neck: Trachea midline. Resp: No accessory muscle use. Bibasilar crackles. No wheezes. No rhonchi. On supp O2, painful w/ inspiration  CV: Regular rate. Regular rhythm. No murmur. No rub. No edema   GI: Non-tender. Non-distended. Obese   Skin: Warm and dry. No nodule on exposed extremities. No rash on exposed extremities. Small area of ecchymosis in right posterolateral chest wall  M/S: No cyanosis. No joint deformity. No clubbing. TTP along right posterior/lateral ribs  Neuro: Awake. Grossly nonfocal    Psych: No anxiety or agitation. I Storm Number have reviewed the chart on [facility] and personally interviewed and examined patient, reviewed the data (labs and imaging) and after discussion with my PA formulated the plan. Agree with note with the following edits. HPI:     I reviewed the patient's Past Medical History, Past Surgical History, Medications, and Allergies. Physical exam:    BP (!) 142/59   Pulse 86   Temp 97.5 °F (36.4 °C) (Oral)   Resp 20   Ht 5' 6\" (1.676 m)   Wt 259 lb (117.5 kg)   SpO2 98%   BMI 41.80 kg/m²     Gen: No distress. Alert. Eyes: . No sclera icterus. No conjunctival injection. ENT: No discharge. Pharynx clear. Neck: Trachea midline. Normal thyroid. Resp: No accessory muscle use. No crackles. No wheezes. No rhonchi.

## 2020-03-03 ENCOUNTER — APPOINTMENT (OUTPATIENT)
Dept: GENERAL RADIOLOGY | Age: 80
DRG: 205 | End: 2020-03-03
Payer: MEDICARE

## 2020-03-03 LAB
ANION GAP SERPL CALCULATED.3IONS-SCNC: 13 MMOL/L (ref 3–16)
BASOPHILS ABSOLUTE: 0 K/UL (ref 0–0.2)
BASOPHILS RELATIVE PERCENT: 0.1 %
BUN BLDV-MCNC: 36 MG/DL (ref 7–20)
CALCIUM SERPL-MCNC: 9 MG/DL (ref 8.3–10.6)
CHLORIDE BLD-SCNC: 91 MMOL/L (ref 99–110)
CO2: 25 MMOL/L (ref 21–32)
CREAT SERPL-MCNC: 2 MG/DL (ref 0.6–1.2)
EOSINOPHILS ABSOLUTE: 0.1 K/UL (ref 0–0.6)
EOSINOPHILS RELATIVE PERCENT: 0.9 %
GFR AFRICAN AMERICAN: 29
GFR NON-AFRICAN AMERICAN: 24
GLUCOSE BLD-MCNC: 109 MG/DL (ref 70–99)
HCT VFR BLD CALC: 30.9 % (ref 36–48)
HEMOGLOBIN: 10 G/DL (ref 12–16)
LYMPHOCYTES ABSOLUTE: 1.1 K/UL (ref 1–5.1)
LYMPHOCYTES RELATIVE PERCENT: 8.4 %
MCH RBC QN AUTO: 28.1 PG (ref 26–34)
MCHC RBC AUTO-ENTMCNC: 32.4 G/DL (ref 31–36)
MCV RBC AUTO: 86.6 FL (ref 80–100)
MONOCYTES ABSOLUTE: 0.9 K/UL (ref 0–1.3)
MONOCYTES RELATIVE PERCENT: 7.5 %
NEUTROPHILS ABSOLUTE: 10.5 K/UL (ref 1.7–7.7)
NEUTROPHILS RELATIVE PERCENT: 83.1 %
PDW BLD-RTO: 13.9 % (ref 12.4–15.4)
PLATELET # BLD: 290 K/UL (ref 135–450)
PMV BLD AUTO: 8 FL (ref 5–10.5)
POTASSIUM REFLEX MAGNESIUM: 4.2 MMOL/L (ref 3.5–5.1)
RBC # BLD: 3.57 M/UL (ref 4–5.2)
SODIUM BLD-SCNC: 129 MMOL/L (ref 136–145)
WBC # BLD: 12.6 K/UL (ref 4–11)

## 2020-03-03 PROCEDURE — 99232 SBSQ HOSP IP/OBS MODERATE 35: CPT | Performed by: INTERNAL MEDICINE

## 2020-03-03 PROCEDURE — 1200000000 HC SEMI PRIVATE

## 2020-03-03 PROCEDURE — 6370000000 HC RX 637 (ALT 250 FOR IP): Performed by: INTERNAL MEDICINE

## 2020-03-03 PROCEDURE — 2700000000 HC OXYGEN THERAPY PER DAY

## 2020-03-03 PROCEDURE — 94761 N-INVAS EAR/PLS OXIMETRY MLT: CPT

## 2020-03-03 PROCEDURE — 97535 SELF CARE MNGMENT TRAINING: CPT

## 2020-03-03 PROCEDURE — 71045 X-RAY EXAM CHEST 1 VIEW: CPT

## 2020-03-03 PROCEDURE — 94640 AIRWAY INHALATION TREATMENT: CPT

## 2020-03-03 PROCEDURE — 85025 COMPLETE CBC W/AUTO DIFF WBC: CPT

## 2020-03-03 PROCEDURE — 80048 BASIC METABOLIC PNL TOTAL CA: CPT

## 2020-03-03 PROCEDURE — 97530 THERAPEUTIC ACTIVITIES: CPT

## 2020-03-03 PROCEDURE — 2580000003 HC RX 258: Performed by: INTERNAL MEDICINE

## 2020-03-03 PROCEDURE — 36415 COLL VENOUS BLD VENIPUNCTURE: CPT

## 2020-03-03 RX ORDER — FUROSEMIDE 40 MG/1
40 TABLET ORAL DAILY
Status: DISCONTINUED | OUTPATIENT
Start: 2020-03-03 | End: 2020-03-04

## 2020-03-03 RX ADMIN — CARBIDOPA AND LEVODOPA 1 TABLET: 25; 100 TABLET ORAL at 22:10

## 2020-03-03 RX ADMIN — TRAMADOL HYDROCHLORIDE 100 MG: 50 TABLET, FILM COATED ORAL at 22:11

## 2020-03-03 RX ADMIN — POLYETHYLENE GLYCOL (3350) 17 G: 17 POWDER, FOR SOLUTION ORAL at 05:37

## 2020-03-03 RX ADMIN — DULOXETINE HYDROCHLORIDE 60 MG: 60 CAPSULE, DELAYED RELEASE ORAL at 09:25

## 2020-03-03 RX ADMIN — NIFEDIPINE 60 MG: 30 TABLET, FILM COATED, EXTENDED RELEASE ORAL at 09:25

## 2020-03-03 RX ADMIN — Medication 2 PUFF: at 19:46

## 2020-03-03 RX ADMIN — CARBIDOPA AND LEVODOPA 1 TABLET: 25; 100 TABLET ORAL at 09:25

## 2020-03-03 RX ADMIN — TRAMADOL HYDROCHLORIDE 100 MG: 50 TABLET, FILM COATED ORAL at 11:28

## 2020-03-03 RX ADMIN — PROPAFENONE HYDROCHLORIDE 150 MG: 150 TABLET, FILM COATED ORAL at 16:49

## 2020-03-03 RX ADMIN — METOPROLOL TARTRATE 50 MG: 50 TABLET, FILM COATED ORAL at 09:25

## 2020-03-03 RX ADMIN — Medication 10 ML: at 22:12

## 2020-03-03 RX ADMIN — ATORVASTATIN CALCIUM 20 MG: 10 TABLET, FILM COATED ORAL at 22:10

## 2020-03-03 RX ADMIN — HYDRALAZINE HYDROCHLORIDE 25 MG: 25 TABLET, FILM COATED ORAL at 22:09

## 2020-03-03 RX ADMIN — FUROSEMIDE 40 MG: 40 TABLET ORAL at 16:49

## 2020-03-03 RX ADMIN — IPRATROPIUM BROMIDE AND ALBUTEROL SULFATE 3 ML: 2.5; .5 SOLUTION RESPIRATORY (INHALATION) at 19:45

## 2020-03-03 RX ADMIN — CARBIDOPA AND LEVODOPA 1 TABLET: 25; 100 TABLET ORAL at 16:49

## 2020-03-03 RX ADMIN — TRAMADOL HYDROCHLORIDE 100 MG: 50 TABLET, FILM COATED ORAL at 05:43

## 2020-03-03 RX ADMIN — IPRATROPIUM BROMIDE AND ALBUTEROL SULFATE 3 ML: 2.5; .5 SOLUTION RESPIRATORY (INHALATION) at 15:26

## 2020-03-03 RX ADMIN — METOPROLOL TARTRATE 50 MG: 50 TABLET, FILM COATED ORAL at 22:10

## 2020-03-03 RX ADMIN — IPRATROPIUM BROMIDE AND ALBUTEROL SULFATE 3 ML: 2.5; .5 SOLUTION RESPIRATORY (INHALATION) at 07:17

## 2020-03-03 RX ADMIN — HYDRALAZINE HYDROCHLORIDE 25 MG: 25 TABLET, FILM COATED ORAL at 09:25

## 2020-03-03 RX ADMIN — IPRATROPIUM BROMIDE AND ALBUTEROL SULFATE 3 ML: 2.5; .5 SOLUTION RESPIRATORY (INHALATION) at 11:11

## 2020-03-03 RX ADMIN — PROPAFENONE HYDROCHLORIDE 150 MG: 150 TABLET, FILM COATED ORAL at 22:11

## 2020-03-03 RX ADMIN — Medication 10 ML: at 09:26

## 2020-03-03 RX ADMIN — ACETAMINOPHEN 650 MG: 325 TABLET ORAL at 16:49

## 2020-03-03 RX ADMIN — MONTELUKAST SODIUM 10 MG: 10 TABLET, COATED ORAL at 22:11

## 2020-03-03 RX ADMIN — PROPAFENONE HYDROCHLORIDE 150 MG: 150 TABLET, FILM COATED ORAL at 05:37

## 2020-03-03 RX ADMIN — Medication 2 PUFF: at 07:18

## 2020-03-03 RX ADMIN — LAMOTRIGINE 50 MG: 25 TABLET ORAL at 22:10

## 2020-03-03 RX ADMIN — LAMOTRIGINE 50 MG: 25 TABLET ORAL at 09:25

## 2020-03-03 ASSESSMENT — PAIN SCALES - GENERAL
PAINLEVEL_OUTOF10: 5
PAINLEVEL_OUTOF10: 2
PAINLEVEL_OUTOF10: 8
PAINLEVEL_OUTOF10: 8
PAINLEVEL_OUTOF10: 2
PAINLEVEL_OUTOF10: 8

## 2020-03-03 ASSESSMENT — PAIN DESCRIPTION - PAIN TYPE: TYPE: ACUTE PAIN

## 2020-03-03 ASSESSMENT — PAIN DESCRIPTION - LOCATION: LOCATION: BACK

## 2020-03-03 ASSESSMENT — PAIN DESCRIPTION - ORIENTATION: ORIENTATION: RIGHT

## 2020-03-03 NOTE — CARE COORDINATION
[address] Transitions Interview     3/4/2020    Patient: Nayely Colorado Patient : 1940   MRN: [id number]  Reason for Admission: fall  RARS: Readmission Risk Score: 24         Spoke with: Nayely Colorado (patient) and spouse    From home with spouse who is her caregiver. They are usually active - going to the [place] on weekdays to play tennis. Plans [facility] ARU likely tomorrow. Following for care transition.        Readmission Risk  Patient Active Problem List   Diagnosis    GERD (gastroesophageal reflux disease)    Essential hypertension    Hyperlipidemia    Restless leg syndrome    Chronic normocytic anemia    Postural dizziness    Chronic kidney disease    Moderate persistent asthma    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-sided sciatica    Morbid obesity with BMI of 40.0-44.9, adult (HCC)    Type 2 diabetes mellitus with stage 3 chronic kidney disease, without long-term current use of insulin (HCC)    CAD (coronary artery disease)    Parkinson's disease (HCC)    Severe obstructive sleep apnea    Hypothyroidism    Hepatic steatosis    Diverticulosis    Blind left eye    L carotid artery stenosis s/p CEA    Paroxysmal atrial fibrillation (HCC)    COPD (chronic obstructive pulmonary disease) (HCC)    Acute on chronic respiratory failure with hypoxia (HCC)    Diastolic congestive heart failure (HCC)    Basal cell carcinoma of left cheek    Acute respiratory failure with hypoxemia (HCC)    Pneumonia due to infectious organism    Anemia due to chronic blood loss    Neoplasm of uncertain behavior of skin    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)    Partial symptomatic epilepsy with complex partial seizures, not intractable, without status epilepticus (HCC)    Intractable pain    Chronic kidney disease, stage 3 ([address]    Fall    Hyperkalemia    Skin tear of left hand without complication    Acute kidney injury superimposed on CKD 9:00 AM Selena Han, PT [facility]   3/24/2020  9:30 AM Selena Han, PT [facility]   3/26/2020  9:00 AM Selena Han, PT [facility] PT Florin BRIDGES   4/6/2020 10:00 AM SCHEDULE, [facility] AWV LPN [facility]   6/16/2020  9:45 AM [facility]   11/25/2020  9:30 AM MAHAD Carrillo - CNP [facility]       Health Maintenance  There are no preventive care reminders to display for this patient.     Ryan Perez RN

## 2020-03-03 NOTE — PROGRESS NOTES
Occupational Therapy Daily Treatment Note    Unit: [facility]  Date:  3/3/2020  Patient Name:    Tracie Laws  Admitting diagnosis:  Intractable pain [R52]  Intractable pain [R52]  Admit Date:  2020  Precautions/Restrictions:   fall risk, IV, bed/chair alarm, purewick and supplemental O2 (3L)      Discharge Recommendations: Acute Rehab (ARU)/ Inpatient [facility] ([facility])  DME needs for discharge: defer to facility       Therapy recommendations for staff:   Assist of 2 (Max A) with use of STEDY or rolling walker (RW) (depending on pt's fatigue) for all transfers to/from BSC/chair    History of Present Illness: 70 y. o. female for mechanical fall. Onset was today. Duration has been since the onset. Context includes pt states she has parkinson and is unsteady at baseline. She reports she was going to the car today and her legs gave out, she fell and hit her head and right posterior rib cage. Pt denies any light headedness or dizziness. Alleviating factors include nothing.       PMHx: anemia, asthma, a fib, bakers cyst, blind in L eye, carotid stenosis, cervical spinal cord compression, chronic midline low back pain with R sided sciatica, CKD stage 3, CRI, detached retina L, diverticulosis, DJD, chronic edema, fatty liver, GERD, herniated lumbar intervertebral disc, hx of stage 2 decubitus ulcer, hyperlipidemia, HTN, BETSY with BiPAP, partial symptomatic epilepsy with complex partial seizures, RLS,  Tremor, Type 2 DM    AM-PAC Score: 13  Home Health S4 Level: NA       Treatment Time:  14:12-15:09  Treatment number:  2   Total Treatment Time:   57 minutes      Subjective:  Pt was found seated in chair today, was receptive to OT tx. Reported fatigue after sitting in chair for most of the day. Pain   Yes  Ratin  Location:L rib cage with deep breaths  Pain Medicine Status:  Nurse made aware-pt is not due for pain meds until 7:30 pm.  Pt was made aware.      Bed Mobility:   Supine to Sit:  Not Tested  Sit to Supine:  Max A of 2  Rolling: Mod A of 2  Scooting:        Not Tested    Transfer Training:   Sit to stand: Max A of 2  Stand to sit:  Min A of 2  Bed to Chair:  Total A via Stedy  Bed to [facility]:   Not Tested  Standard toilet:   Not Tested    Activity Tolerance   Pt completed therapy session with Pain noted with R UE movement  SOB noted with transfers  Seated:  SpO2: 95%-stayed above 90% throughout tx with activity  HR: 69-74 with activity  BP: 118/66    ADL Training:   Upper body dressing: Mod A  Upper body bathing:  SBA  Lower body dressing: Total A to don brief in bed and B socks  Lower body bathing: Total A   Toileting: Total A -using purewick  Grooming/Hygiene:  Not Tested    Therapeutic Exercise:   N/A    Patient Education:   Role of OT  Recommendations for DC   Use of STEDY    Positioning Needs: In bed, call light and needs in reach. Family Present:  Yes-    Assessment: Pt was very fatigued today, but was receptive to ADL session and transfer back into bed. Pt participated with SpO2 remaining in  The 90's throughout tx. Pt should continue to benefit from skilled OT tx to maximize independence so that she may eventually return home with the least amount of assistance. GOALS  To be met in 3 Visits:  1). Bed to [facility]: CGA and with use of RW     To be met in 5 Visits:  1). Supine to Sit: Supervision  2). Upper Body Bathing:  Min A  3). Lower Body Bathing:  Min A with appropriate AE  4). Upper Body Dressing: Min A  5). Lower Body Dressing: Min A with appropriate AE   6).  Pt to kimo UE exs x 15 reps      Plan: cont with 90Adrian Mijares MS, OTR/L  #[id number]      If patient discharges from this facility prior to next visit, this note will serve as the Discharge Summary

## 2020-03-03 NOTE — PROGRESS NOTES
Inpatient Physical Therapy Daily Treatment Note    Unit: [facility]  Date:  3/3/2020  Patient Name:    Antoinette Holter  Admitting diagnosis:  Intractable pain [R52]  Intractable pain [R52]  Admit Date:  2/29/2020  Precautions/Restrictions:  fall risk, bed/chair alarm, supplemental O2 (3L) and continuous pulse ox, dx Parkinson's disease      Discharge Recommendations: Acute Rehab (ARU)/ Inpatient [facility] ([facility])  DME needs for discharge: defer to facility       Therapy recommendation for EMS Transport: requires transport by cot due to difficulty with transfers    Therapy recommendations for staff:   Assist of 2 with use of rolling walker (RW) and gait belt for all transfers to/from BSC/chair   May need Assist of 2 with gait belt and STEDY for transfer if pt is fatigued. History of Present Illness: Pt with  Hx of PD was attempting to transfer to her car in her garage when she states her legs gave out and she fell, hitting her side on the running board of her car. Severe pain. Pt was unable to ambulate following the fall 2/2 pain & weakness. Work up in the ED showed a small effusion on the right poss related to atelectasis. CT cervical spine and head were negative. PMH: Parkinson's Disease, Asthma, PAF, CKD stage III, DM controlled with diet, HTN, HLD     Home Health S4 Level Recommendation: NA  AM-PAC Mobility Score   AM-PAC Inpatient Mobility Raw Score : 11       Treatment Time:  09:53-10:26  Treatment number: 2  Timed Code Treatment Minutes: 33 minutes  Total Treatment Minutes:  33  minutes    Cognition    A&O Person, Place, Time and Situation   Able to follow 2 step commands    Subjective  Patient lying supine in bed with no family present  Pt agreeable to this PT tx.      Pain   Yes  Location: R side   Rating:  moderate/10  Pain Medicine Status: Received pain med prior to tx, RN aware of pt pain    Bed Mobility   Supine to Sit:   Max A with HOB elevated and use of rail  Sit to Supine:  Not Tested  Rolling: alert and oriented x4. Pt is able to verbalize understanding of use of call light and provides informed consent for use of recliner. Pt left in recliner as noted above. Activity Tolerance   Pt completed therapy session with Pain noted with all mobility  SOB noted with transfers. SpO2: 91-92% on 3L throughout session  HR:  bpm throughout session  BP:     Other  None. Assessment :  Patient is limited by pain this date and requires moderate encouragement to participate in session. Pt was unable to come to standing with assist of 1 and required assist of 2 with cues for sequencing and elevated bed to stand to RW. The RN was updated on the patient performance and potential need for use of STEDY for safe transfers depending on pt's fatigue level. Pt would benefit from co-treatment with occupational therapy for safe transfers and to facilitate functional mobility at next session. Goals (all goals ongoing unless otherwise indicated)  To be met in 3 visits:  1). Independent with LE Ex x 10 reps     To be met in 6 visits:  1). Supine to/from sit: MIN A  2). Sit to/from stand: Min A  3). Bed to chair: Min A  4). Gait: Ambulate 50 ft.  with  Min A  and use of rolling walker (RW)  5). Tolerate B LE exercises 3 sets of 10-15 reps  6). Ascend/descend 1 step without rail, with Min A and use of RW. Plan   Continue with plan of care. Signature: Daryn Virgen PT, DPT #[id number]    If patient discharges from this facility prior to next visit, this note will serve as the Discharge Summary.

## 2020-03-04 ENCOUNTER — APPOINTMENT (OUTPATIENT)
Dept: GENERAL RADIOLOGY | Age: 80
DRG: 205 | End: 2020-03-04
Payer: MEDICARE

## 2020-03-04 ENCOUNTER — APPOINTMENT (OUTPATIENT)
Dept: CT IMAGING | Age: 80
DRG: 205 | End: 2020-03-04
Payer: MEDICARE

## 2020-03-04 LAB
ANION GAP SERPL CALCULATED.3IONS-SCNC: 15 MMOL/L (ref 3–16)
BUN BLDV-MCNC: 40 MG/DL (ref 7–20)
CALCIUM SERPL-MCNC: 8.8 MG/DL (ref 8.3–10.6)
CHLORIDE BLD-SCNC: 92 MMOL/L (ref 99–110)
CO2: 24 MMOL/L (ref 21–32)
CREAT SERPL-MCNC: 1.9 MG/DL (ref 0.6–1.2)
GFR AFRICAN AMERICAN: 31
GFR NON-AFRICAN AMERICAN: 26
GLUCOSE BLD-MCNC: 121 MG/DL (ref 70–99)
GLUCOSE BLD-MCNC: 89 MG/DL (ref 70–99)
PERFORMED ON: NORMAL
POTASSIUM SERPL-SCNC: 4.3 MMOL/L (ref 3.5–5.1)
SODIUM BLD-SCNC: 131 MMOL/L (ref 136–145)

## 2020-03-04 PROCEDURE — 97530 THERAPEUTIC ACTIVITIES: CPT

## 2020-03-04 PROCEDURE — 6370000000 HC RX 637 (ALT 250 FOR IP): Performed by: INTERNAL MEDICINE

## 2020-03-04 PROCEDURE — 2700000000 HC OXYGEN THERAPY PER DAY

## 2020-03-04 PROCEDURE — 2580000003 HC RX 258: Performed by: INTERNAL MEDICINE

## 2020-03-04 PROCEDURE — 94640 AIRWAY INHALATION TREATMENT: CPT

## 2020-03-04 PROCEDURE — 99232 SBSQ HOSP IP/OBS MODERATE 35: CPT | Performed by: INTERNAL MEDICINE

## 2020-03-04 PROCEDURE — 74176 CT ABD & PELVIS W/O CONTRAST: CPT

## 2020-03-04 PROCEDURE — 74018 RADEX ABDOMEN 1 VIEW: CPT

## 2020-03-04 PROCEDURE — 36415 COLL VENOUS BLD VENIPUNCTURE: CPT

## 2020-03-04 PROCEDURE — 94761 N-INVAS EAR/PLS OXIMETRY MLT: CPT

## 2020-03-04 PROCEDURE — 80048 BASIC METABOLIC PNL TOTAL CA: CPT

## 2020-03-04 PROCEDURE — 1200000000 HC SEMI PRIVATE

## 2020-03-04 RX ORDER — POLYETHYLENE GLYCOL 3350 17 G/17G
17 POWDER, FOR SOLUTION ORAL 2 TIMES DAILY
Status: DISCONTINUED | OUTPATIENT
Start: 2020-03-04 | End: 2020-03-07

## 2020-03-04 RX ORDER — SODIUM CHLORIDE 9 MG/ML
INJECTION, SOLUTION INTRAVENOUS CONTINUOUS
Status: DISCONTINUED | OUTPATIENT
Start: 2020-03-04 | End: 2020-03-08

## 2020-03-04 RX ADMIN — Medication 2 PUFF: at 08:05

## 2020-03-04 RX ADMIN — CARBIDOPA AND LEVODOPA 1 TABLET: 25; 100 TABLET ORAL at 20:01

## 2020-03-04 RX ADMIN — DULOXETINE HYDROCHLORIDE 60 MG: 60 CAPSULE, DELAYED RELEASE ORAL at 09:15

## 2020-03-04 RX ADMIN — LAMOTRIGINE 50 MG: 25 TABLET ORAL at 09:15

## 2020-03-04 RX ADMIN — IPRATROPIUM BROMIDE AND ALBUTEROL SULFATE 3 ML: 2.5; .5 SOLUTION RESPIRATORY (INHALATION) at 15:51

## 2020-03-04 RX ADMIN — HYDRALAZINE HYDROCHLORIDE 25 MG: 25 TABLET, FILM COATED ORAL at 09:22

## 2020-03-04 RX ADMIN — Medication 10 ML: at 09:10

## 2020-03-04 RX ADMIN — PROPAFENONE HYDROCHLORIDE 150 MG: 150 TABLET, FILM COATED ORAL at 06:09

## 2020-03-04 RX ADMIN — PROPAFENONE HYDROCHLORIDE 150 MG: 150 TABLET, FILM COATED ORAL at 15:48

## 2020-03-04 RX ADMIN — LAMOTRIGINE 50 MG: 25 TABLET ORAL at 20:00

## 2020-03-04 RX ADMIN — SODIUM CHLORIDE: 9 INJECTION, SOLUTION INTRAVENOUS at 09:09

## 2020-03-04 RX ADMIN — METOPROLOL TARTRATE 50 MG: 50 TABLET, FILM COATED ORAL at 20:00

## 2020-03-04 RX ADMIN — ATORVASTATIN CALCIUM 20 MG: 10 TABLET, FILM COATED ORAL at 20:01

## 2020-03-04 RX ADMIN — METOPROLOL TARTRATE 50 MG: 50 TABLET, FILM COATED ORAL at 09:15

## 2020-03-04 RX ADMIN — CARBIDOPA AND LEVODOPA 1 TABLET: 25; 100 TABLET ORAL at 09:15

## 2020-03-04 RX ADMIN — HYDRALAZINE HYDROCHLORIDE 25 MG: 25 TABLET, FILM COATED ORAL at 20:00

## 2020-03-04 RX ADMIN — IPRATROPIUM BROMIDE AND ALBUTEROL SULFATE 3 ML: 2.5; .5 SOLUTION RESPIRATORY (INHALATION) at 08:04

## 2020-03-04 RX ADMIN — SODIUM CHLORIDE: 9 INJECTION, SOLUTION INTRAVENOUS at 22:04

## 2020-03-04 RX ADMIN — NIFEDIPINE 60 MG: 30 TABLET, FILM COATED, EXTENDED RELEASE ORAL at 09:15

## 2020-03-04 RX ADMIN — Medication 2 PUFF: at 20:17

## 2020-03-04 RX ADMIN — POLYETHYLENE GLYCOL (3350) 17 G: 17 POWDER, FOR SOLUTION ORAL at 20:00

## 2020-03-04 RX ADMIN — Medication 10 ML: at 21:43

## 2020-03-04 RX ADMIN — PROPAFENONE HYDROCHLORIDE 150 MG: 150 TABLET, FILM COATED ORAL at 21:08

## 2020-03-04 RX ADMIN — MONTELUKAST SODIUM 10 MG: 10 TABLET, COATED ORAL at 20:00

## 2020-03-04 RX ADMIN — IPRATROPIUM BROMIDE AND ALBUTEROL SULFATE 3 ML: 2.5; .5 SOLUTION RESPIRATORY (INHALATION) at 20:16

## 2020-03-04 RX ADMIN — CARBIDOPA AND LEVODOPA 1 TABLET: 25; 100 TABLET ORAL at 14:12

## 2020-03-04 NOTE — PROGRESS NOTES
Pt had lg bm, Dr Clay Spangler here to see pt informed him of bm. Pt assisted back to bed per PT/OT.

## 2020-03-04 NOTE — PROGRESS NOTES
Inpatient Physical Therapy Daily Treatment Note  Co-treatment with Occupational Therapy    Unit: [facility]  Date:  3/4/2020  Patient Name:    Patric Multani  Admitting diagnosis:  Intractable pain [R52]  Intractable pain [R52]  Admit Date:  2/29/2020  Precautions/Restrictions:  fall risk, bed/chair alarm, supplemental O2 (3L) and continuous pulse ox, dx Parkinson's disease      Discharge Recommendations: SNF (change in recommendation)  DME needs for discharge: defer to facility       Therapy recommendation for EMS Transport: requires transport by cot due to difficulty with transfers    Therapy recommendations for staff:   Assist of 2 with use of STEDY and gait belt for all transfers to/from BSC/chair   May need Assist of 2 with Maxi-move for transfer if pt is fatigued. History of Present Illness: Pt with  Hx of PD was attempting to transfer to her car in her garage when she states her legs gave out and she fell, hitting her side on the running board of her car. Severe pain. Pt was unable to ambulate following the fall 2/2 pain & weakness. Work up in the ED showed a small effusion on the right poss related to atelectasis. CT cervical spine and head were negative. PMH: Parkinson's Disease, Asthma, PAF, CKD stage III, DM controlled with diet, HTN, HLD     Home Health S4 Level Recommendation: NA  AM-PAC Mobility Score   AM-PAC Inpatient Mobility Raw Score : 10       Treatment Time:  10:59-12:01  Treatment number: 3  Timed Code Treatment Minutes: 62 minutes  Total Treatment Minutes:  62 minutes    Cognition    A&O Person, Place, Time and Situation   Able to follow 2 step commands    Subjective  Patient lying supine in bed with no family present  Pt agreeable to this PT tx.      Pain   None at rest; pain with mobility  Location: R side   Rating:  severe/10  Pain Medicine Status: Received pain med prior to tx, RN aware of pt pain    Bed Mobility   Supine to Sit:   Mod A of 2 with HOB elevated and use of rail  Sit to Supine:  total A via maxi-move  Rolling: Max A of 1-2  Scooting: Max A at [employer]     Sit to stand: Mod A of 2 from bed; Max A of 2 from [facility] (took 2 attempts)   Stand to sit: Max A of 2 to control descent to [facility] and to floor  Bed to Chair:  Mod A of 2 with use of rolling walker (RW) and gait belt, decreased step length bilaterally, x2   Pt with increased knee flexion and quad fatigue during 2nd trial, knees began to give out. Pt lowered to floor in controlled descent with Max A of 2 via gait belt. Maxi-move with assist of 2 to lift pt from floor to bed. Safe-care completed by PT/OT. Pt's nurse in room for floor>bed transfer. Gait Training gait deferred due to difficulty with transfers; pt ambulated 0 ft. Distance:  N/A ft  Deviations (firm surface/linoleum): N/A   Assistive Device Used:  N/A  Level of Assist: N/A  Comment:     Stair Training deferred, pt unsafe/not appropriate to complete stairs at this time    Therapeutic Exercise Veena deferred for focus on functional mobility (disregard section)    Balance  Static Sitting:  Fair    Tolerance: max A at EOB initially with intermittent R lean, improved to CGA at EOB with hand over hand assist for use of UE support on bed  Dynamic Sitting:  Fair     Tolerance: SBA for UE reaching with max encouragement (see OT note) x3 minutes  Static Standing: Fair    Tolerance: min-mod A of 1 with RW and gait belt x3 minutes for pericare, fatigues rapidly  Dynamic Standing: Fair     Patient Education      Role of PT, POC, Discharge recommendations, DC recommendations, safety awareness, transfer techniques, pursed lip breathing, pacing activity and calling for assist with mobility. Positioning Needs       Pt instructed and educated on use of call light to call for assist if desiring to get up or change position, call light handed to pt and needs within reach, Pt returned to bed, alarm set and pillows placed for support/comfort.  SCDs placed on B

## 2020-03-04 NOTE — PROGRESS NOTES
with the RW  Bed to [facility]:   Mod assist of two  Standard toilet:   Not Tested    Activity Tolerance   Pt completed therapy session with movement and pt SOB with activity  Seated:  SpO2: 95%on 3 liters  HR: up to 120 with activity      ADL Training:   Upper body dressing:  NT  Upper body bathing:  NT  Lower body dressing:  NT  Lower body bathing: Total A   Toileting: Total A for wiping after bowel movement  Grooming/Hygiene:  Not Tested    Therapeutic Exercise:   N/A    Patient Education:   Role of OT  Recommendations for DC   Use of STEDY    Positioning Needs: In bed, call light and needs in reach. Family Present:  Yes-    Assessment: Pt was very fatigued today with transfer. Pt was Mod of two from the bed and mod-max of two from the [facility] for sit to stand. Pt was mod assist of two for transfer to the [facility] from the bed. On 2nd trial pt's LEs fatigued rapidly with decreased quad control and pt's knees buckled. Pt was unable to return to full standing even with therapist blocking B knees anteriorly. Pt was lowered safely to floor with assist of 2 and tolerated maxi-move for transfer back to bed. Changing d/c recommendation due to pt's decreased tolerance to therapy over the last few days, now recommending SNF (instead of ARU). Pt participated with SpO2 remaining in  The 90's throughout tx. Pt should continue to benefit from skilled OT tx to maximize independence so that she may eventually return home with the least amount of assistance. GOALS  To be met in 3 Visits:  1). Bed to [facility]: CGA and with use of RW     To be met in 5 Visits:  1). Supine to Sit: Supervision  2). Upper Body Bathing:  Min A  3). Lower Body Bathing:  Min A with appropriate AE  4). Upper Body Dressing: Min A  5). Lower Body Dressing: Min A with appropriate AE   6).  Pt to kimo UE exs x 15 reps      Plan: cont with POC    Mary Jane Bocanegra OTR/L [id number]      If patient discharges from this facility prior to next visit, this note will serve as

## 2020-03-04 NOTE — PROGRESS NOTES
patient at risk for multiple co-morbidities as well as early death and contributing to the patient's presentation.   - Counseled on weight loss.      #BETSY  - BiPAP QHS    DVT Prophylaxis: SCD   Diet: DIET CARDIAC; Low Sodium (2 GM); Daily Fluid Restriction: 1500 ml  Code Status: Full Code    ONOFRE Canales.

## 2020-03-04 NOTE — PROGRESS NOTES
Patient is laying with eyes closed on Bipap. No s/s of distress at this time. Call light and bedside table within reach. Will continue to monitor.

## 2020-03-04 NOTE — CONSULTS
Gastroenterology Consult Note    Patient:   Clement Prescott   :    1940   Facility:   [facility]  Referring/PCP: Zelda Grajeda MD  Date:     3/4/2020  Consultant:   Mukund Napier PA-C      Chief Complaint   Patient presents with   Laura Dominguez     pt states \"I have Parkinsons and I was walking into the garage to get into the car and my legs gave out on me\" pt's  states pt fell onto her butt and her right side hit into the car, pt c/o right ribs pain, right shoulder pain, and right clavicle pain, pt states she hit her head but did not have LOC, pt takes eliquis and baby aspirin dailiy         History of Present illness   61-year-old female with a history of anemia, asthma, atrial fibrillation, CKD, diabetes, diverticulosis, GERD, HTN, HLD, and hypothyroidism presents to the ER 4 days ago complaining of a mechanical fall. She says she was walking down the steps in her garage when her legs gave out and caused her to fall. When she fell she hit her head and right posterior rib cage. She has been having diarrhea for the past 4 days and increasing abdominal bloating and cramping.      Past Medical History:   Diagnosis Date    Anemia, unspecified     neg bone marrow    Asthma     Atrial fibrillation ([address]     Baker's cyst     Blind left eye     Carotid artery stenoses     Carotid stenosis     left    Cervical spinal cord compression ([address] 2010    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-sided sciatica 2016    CKD (chronic kidney disease) stage 3, GFR 30-59 ml/min ([facility])     Community acquired pneumonia of left lower lobe of lung ([address] 2018    CRI     Detached retina, left     Diverticulosis     DJD (degenerative joint disease)     Edema     chronic    Fatty liver     GERD (gastroesophageal reflux disease)     Herniated lumbar intervertebral disc     Hx stage 2 sacral decubitus ulcer 2018    Hyperlipidemia     Hypertension     Hypothyroid 03/02/20  0507 03/03/20  0527   WBC 11.7* 12.6*   HGB 10.1* 10.0*   HCT 31.9* 30.9*   MCV 88.9 86.6    290     BMP:   Recent Labs     03/02/20  0507 03/03/20  0527 03/04/20  0633   * 129* 131*   K 4.8 4.2 4.3   CL 93* 91* 92*   CO2 26 25 24   BUN 32* 36* 40*   CREATININE 2.0* 2.0* 1.9*     LIVER PROFILE: No results for input(s): AST, ALT, LIPASE, PROT, BILIDIR, BILITOT, ALKPHOS in the last 72 hours. Invalid input(s): AMYLASE,  ALB    IMAGING:  CT ABDOMEN PELVIS WO CONTRAST Additional Contrast? None   Impression:     Marked distension of the ascending and transverse colon with mild gradual  decompression distally.  Air-fluid levels are present throughout.  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 As the distal rectum/anus is outside the field of view, recommend rectal exam to ensure there is no distal mass. Colonic diverticulosis. Small bilateral effusions, increased since 02/29/2020. XR ABDOMEN (KUB) (SINGLE AP VIEW)   Impression   Gaseous distention of the colon without obstruction, possibly an adynamic   ileus or [place]'s syndrome. XR CHEST PORTABLE   Impression:     New bilateral pleural effusions. Attending Supervising [de-identified] Attestation Statement  The patient is a 70 y.o. female. I have performed a history and physical examination of the patient. I discussed the case with my physician assistant Amaury Lemus PA-C    I reviewed the patient's Past Medical History, Past Surgical History, Medications, and Allergies.      Physical Exam:  Vitals:    03/04/20 0800 03/04/20 0806 03/04/20 1425 03/04/20 1551   BP: (!) 141/64  115/63    Pulse: 73  68    Resp: 18 18 18 18   Temp: 97.9 °F (36.6 °C)  98.4 °F (36.9 °C)    TempSrc: Oral  Oral    SpO2: (!) 88% 92% 93% 93%   Weight:       Height:           Physical Examination: General appearance - alert, well appearing, and in no distress and ill-appearing  Mental status - alert, oriented to person, place, and time  Eyes - pupils equal and reactive, extraocular eye movements intact  Neck - supple, no significant adenopathy  Chest - clear to auscultation, no wheezes, rales or rhonchi, symmetric air entry  Heart - normal rate, regular rhythm, normal S1, S2, no murmurs, rubs, clicks or gallops  Abdomen - tenderness noted upper quadrants  Extremities - no pedal edema noted       Assessment:   28-year-old female with a history of anemia, asthma, atrial fibrillation, CKD, diabetes, diverticulosis, GERD, HTN, HLD, and hypothyroidism presents to the ER 4 days ago complaining of a mechanical fall. 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 She has excessive bloating and abdominal tenderness in the RUQ and epigastric areas. Plan:   1. Continue supportive care  2. NPO, IVF  3. PT/OT  4. Continue glycolax  5. Repeat KUB  6. Will follow    Aniyah Sanchez PA-C  11:24 AM 3/4/2020                        28-year-old female with a history of anemia, asthma, atrial fibrillation, CKD, diabetes, diverticulosis, GERD, HTN, HLD, and hypothyroidism admitted with fall. CT abd for distention showed colonic ileus. Recommend PT/OT, monitor and replenish electrolytes, start Miralax BID, PT/OT. Minimize narcotics if possible. Repeat KUB.  Will follow    Alem Garcia MD          [phone]  [facility]

## 2020-03-04 NOTE — PROGRESS NOTES
Pt in bed,  at bedside and both participated in bedside report with good questions answered - POC for NPO to rest GI tonight and reevaluate tomorrow discussed with verbal understanding received. No other needs at this time. Call light use reviewed with verbal understanding received and call light in reach.

## 2020-03-04 NOTE — PROGRESS NOTES
Shift assessment completed. Pt is alert and oriented. Vital signs are WNL. Respirations are even but shallow and labored. Bi pap in place already. Pt complains of pain but only when moving. Lung sounds diminished with wheezing. Abd noted to be distended with abnormal bulge to upper portion. Pt states it is new. No bm since 2/29. MD notified with new orders. Pt denies needs at this time. SR up x 2 and bed in low position. Call light is within reach. Will monitor.

## 2020-03-04 NOTE — PROGRESS NOTES
Pulmonary Progress Note  CC: Chest pain after fall    Subjective:  Right lateral chest pain is better, having abdominal cramping    IV line peripheral    EXAM:   /63   Pulse 68   Temp 98.4 °F (36.9 °C) (Oral)   Resp 18   Ht 5' 6\" (1.676 m)   Wt 259 lb (117.5 kg)   SpO2 93%   BMI 41.80 kg/m²  on 3 L  Constitutional:  No acute distress   HEENT: no scleral icterus  Neck: No tracheal deviation present. Cardiovascular: Normal heart sounds. Pulmonary/Chest: No wheezes. No rhonchi. No rales. + decreased breath sounds. No accessory muscle usage or stridor. Bruising over the right lateral chest wall  Abdominal: Soft. Slightly distended  Musculoskeletal: No cyanosis. No clubbing. Skin: Skin is warm and dry.      Scheduled Meds:   DULoxetine  60 mg Oral Daily    atorvastatin  20 mg Oral Nightly    carbidopa-levodopa  1 tablet Oral TID    hydrALAZINE  25 mg Oral BID    lamoTRIgine  50 mg Oral BID    metoprolol tartrate  50 mg Oral BID    montelukast  10 mg Oral Nightly    NIFEdipine  60 mg Oral Daily    propafenone  150 mg Oral 3 times per day    sodium chloride flush  10 mL Intravenous 2 times per day    lidocaine  1 patch Transdermal Daily    ipratropium-albuterol  1 vial Inhalation 4x daily    fluticasone  2 puff Inhalation BID     Continuous Infusions:   sodium chloride 75 mL/hr at 03/04/20 0909     PRN Meds:  albuterol sulfate HFA, sodium chloride flush, acetaminophen **OR** acetaminophen, polyethylene glycol, promethazine **OR** ondansetron, traMADol **OR** traMADol, morphine **OR** morphine    Labs:  CBC:   Recent Labs     03/02/20  0507 03/03/20  0527   WBC 11.7* 12.6*   HGB 10.1* 10.0*   HCT 31.9* 30.9*   MCV 88.9 86.6    290     BMP:   Recent Labs     03/02/20  0507 03/03/20  0527 03/04/20  0633   * 129* 131*   K 4.8 4.2 4.3   CL 93* 91* 92*   CO2 26 25 24   BUN 32* 36* 40*   CREATININE 2.0* 2.0* 1.9*       Cultures:  None    Films:  CT CHEST 2/29/20  Impression Dependent airspace disease bilaterally, right greater than left.  Associated   small effusion on the right is noted.  Findings may be related to   atelectasis.  A component of contusion in the right lung base would be   difficult to exclude given the history of recent trauma.       No pneumothorax.       Small mediastinal and retrocrural nodes     Abdominal CT 3/4/2020   Impression:        Marked distension of the ascending and transverse colon with mild gradual  decompression distally.  Air-fluid levels are present throughout.  Altogether  findings are suggestive of [place]'s syndrome (colonic pseudo-obstruction). As the distal rectum/anus is outside the field of view, recommend rectal exam  to ensure there is no distal mass. Colonic diverticulosis. Small bilateral effusions, increased since 02/29/2020. CXR 3/3/20  FINDINGS:   Small to moderate pleural effusions bilaterally, greater on the left, new.    Pulmonary vascular congestion centrally.  No pneumothorax.  No acute bony   abnormality.           Impression   New bilateral pleural effusions.           ASSESSMENT:  · Fall  · Right lateral inferior chest wall pain  · Pulmonary contusion  · Pleural effusions -remains small on follow-up CT imaging  · Paroxysmal atrial fibrillation on Eliquis previously, restarted now  · Asthma treated by PCP, no acute exacerbation    PLAN:  · Inhaled corticosteroid and Singulair for asthma  · Scheduled bronchodilators  · Incentive spirometry  · Lidocaine patch  · PRN IV morphine  · Lasix per internal medicine

## 2020-03-04 NOTE — PROGRESS NOTES
Handoff report and transfer of care given at bedside to [facility], [address] [facility]. Patient in stable condition, denies needs/concerns at this time. Call light within reach.

## 2020-03-04 NOTE — PROGRESS NOTES
 HYSTERECTOMY      JOINT REPLACEMENT Bilateral 2003    KNEE SURGERY      replacement x 2    SHOULDER SURGERY      TONSILLECTOMY      TUBAL LIGATION      VITRECTOMY         Level of Consciousness: Alert, Oriented, and Cooperative = 0    Level of Activity: Bedridden, unresponsive or quadriplegic = 4    Respiratory Pattern: Dyspnea with exertion;Irregular pattern;or RR less than 6 = 2    Breath Sounds: Diminshed bilaterally and/or crackles = 2    Sputum   ,  , Sputum How Obtained: Spontaneous cough  Cough: Strong, spontaneous, non-productive = 0    Vital Signs   BP (!) 145/68   Pulse 68   Temp 97.1 °F (36.2 °C) (Oral)   Resp 17   Ht 5' 6\" (1.676 m)   Wt 259 lb (117.5 kg)   SpO2 92%   BMI 41.80 kg/m²   SPO2 (COPD values may differ): 88-89% on room air or greater than 92% on FiO2 28- 35% = 2    Peak Flow (asthma only): not applicable = 0    RSI: 7-43 = TID (three times daily) and Q4hr PRN for dyspnea        Plan       Goals: mobilize retained secretions, volume expansion and improve oxygenation    Patient/caregiver was educated on the proper method of use for Respiratory Care Devices:  Yes      Level of patient/caregiver understanding able to:   ? Verbalize understanding   ? Demonstrate understanding       ? Teach back        ? Needs reinforcement       ? No available caregiver               ? Other:     Response to education:  Good     Is patient being placed on Home Treatment Regimen? No     Does the patient have everything they need prior to discharge? NA     Comments: pt assessed and chart reviewed    Plan of Care: leaving pt duoneb qid for dyspnea, flovent 2 puffs bid    Electronically signed by Sheila Herman RCP on 3/3/2020 at 9:56 PM    Respiratory Protocol Guidelines     1. Assessment and treatment by Respiratory Therapy will be initiated for medication and therapeutic interventions upon initiation of aerosolized medication.   2. Physician will be contacted for respiratory rate (RR) greater than 35 breaths per minute. Therapy will be held for heart rate (HR) greater than 140 beats per minute, pending direction from physician. 3. Bronchodilators will be administered via Metered Dose Inhaler (MDI) with spacer when the following criteria are met:  a. Alert and cooperative     b. HR < 140 bpm  c. RR < 30 bpm                d. Can demonstrate a 2-3 second inspiratory hold  4. Bronchodilators will be administered via Hand Held Nebulizer [facility]) to patients when ANY of the following criteria are met  a. Incognizant or uncooperative          b. Patients treated with HHN at Home        c. Unable to demonstrate proper use of MDI with spacer     d. RR > 30 bpm   5. Bronchodilators will be delivered via Metered Dose Inhaler (MDI), HHN, Aerogen to intubated patients on mechanical ventilation. 6. Inhalation medication orders will be delivered and/or substituted as outlined below. Aerosolized Medications Ordering and Administration Guidelines:    1. All Medications will be ordered by a physician, and their frequency and/or modality will be adjusted as defined by the patients Respiratory Severity Index (RSI) score. 2. If the patient does not have documented COPD, consider discontinuing anticholinergics when RSI is less than 9.  3. If the bronchospasm worsens (increased RSI), then the bronchodilator frequency can be increased to a maximum of every 4 hours. If greater than every 4 hours is required, the physician will be contacted. 4. If the bronchospasm improves, the frequency of the bronchodilator can be decreased, based on the patient's RSI, but not less than home treatment regimen frequency. 5. Bronchodilator(s) will be discontinued if patient has a RSI less than 9 and has received no scheduled or as needed treatment for 72  Hrs. Patients Ordered on a Mucolytic Agent:    1. Must always be administered with a bronchodilator.     2. Discontinue if patient experiences worsened bronchospasm, or secretions have

## 2020-03-04 NOTE — PROGRESS NOTES
Pt back from xray and ct. Does complain of pain while moving. Does not want pain medication at this time. Scd. Applied. Bipap applied. Pt very tired. Call light within reach will monitor.

## 2020-03-05 LAB
ANION GAP SERPL CALCULATED.3IONS-SCNC: 13 MMOL/L (ref 3–16)
BUN BLDV-MCNC: 41 MG/DL (ref 7–20)
CALCIUM SERPL-MCNC: 8.7 MG/DL (ref 8.3–10.6)
CHLORIDE BLD-SCNC: 94 MMOL/L (ref 99–110)
CO2: 23 MMOL/L (ref 21–32)
CREAT SERPL-MCNC: 1.8 MG/DL (ref 0.6–1.2)
GFR AFRICAN AMERICAN: 33
GFR NON-AFRICAN AMERICAN: 27
GLUCOSE BLD-MCNC: 77 MG/DL (ref 70–99)
GLUCOSE BLD-MCNC: 81 MG/DL (ref 70–99)
GLUCOSE BLD-MCNC: 84 MG/DL (ref 70–99)
GLUCOSE BLD-MCNC: 86 MG/DL (ref 70–99)
GLUCOSE BLD-MCNC: 92 MG/DL (ref 70–99)
GLUCOSE BLD-MCNC: 94 MG/DL (ref 70–99)
HCT VFR BLD CALC: 29.8 % (ref 36–48)
HEMOGLOBIN: 9.7 G/DL (ref 12–16)
MCH RBC QN AUTO: 28.2 PG (ref 26–34)
MCHC RBC AUTO-ENTMCNC: 32.4 G/DL (ref 31–36)
MCV RBC AUTO: 87 FL (ref 80–100)
PDW BLD-RTO: 13.7 % (ref 12.4–15.4)
PERFORMED ON: NORMAL
PLATELET # BLD: 301 K/UL (ref 135–450)
PMV BLD AUTO: 8.4 FL (ref 5–10.5)
POTASSIUM SERPL-SCNC: 4.3 MMOL/L (ref 3.5–5.1)
RBC # BLD: 3.43 M/UL (ref 4–5.2)
SODIUM BLD-SCNC: 130 MMOL/L (ref 136–145)
WBC # BLD: 8.7 K/UL (ref 4–11)

## 2020-03-05 PROCEDURE — 2700000000 HC OXYGEN THERAPY PER DAY

## 2020-03-05 PROCEDURE — 85027 COMPLETE CBC AUTOMATED: CPT

## 2020-03-05 PROCEDURE — 2580000003 HC RX 258: Performed by: INTERNAL MEDICINE

## 2020-03-05 PROCEDURE — 97110 THERAPEUTIC EXERCISES: CPT

## 2020-03-05 PROCEDURE — 6370000000 HC RX 637 (ALT 250 FOR IP): Performed by: INTERNAL MEDICINE

## 2020-03-05 PROCEDURE — 36415 COLL VENOUS BLD VENIPUNCTURE: CPT

## 2020-03-05 PROCEDURE — 94640 AIRWAY INHALATION TREATMENT: CPT

## 2020-03-05 PROCEDURE — 99232 SBSQ HOSP IP/OBS MODERATE 35: CPT | Performed by: INTERNAL MEDICINE

## 2020-03-05 PROCEDURE — 97530 THERAPEUTIC ACTIVITIES: CPT

## 2020-03-05 PROCEDURE — 94761 N-INVAS EAR/PLS OXIMETRY MLT: CPT

## 2020-03-05 PROCEDURE — 1200000000 HC SEMI PRIVATE

## 2020-03-05 PROCEDURE — 80048 BASIC METABOLIC PNL TOTAL CA: CPT

## 2020-03-05 PROCEDURE — 6360000002 HC RX W HCPCS: Performed by: INTERNAL MEDICINE

## 2020-03-05 RX ORDER — ACETAMINOPHEN 650 MG/1
650 SUPPOSITORY RECTAL EVERY 6 HOURS PRN
Status: DISCONTINUED | OUTPATIENT
Start: 2020-03-06 | End: 2020-03-20 | Stop reason: HOSPADM

## 2020-03-05 RX ORDER — ACETAMINOPHEN 10 MG/ML
650 INJECTION, SOLUTION INTRAVENOUS EVERY 4 HOURS
Status: COMPLETED | OUTPATIENT
Start: 2020-03-05 | End: 2020-03-06

## 2020-03-05 RX ORDER — ACETAMINOPHEN 10 MG/ML
1000 INJECTION, SOLUTION INTRAVENOUS EVERY 6 HOURS
Status: DISCONTINUED | OUTPATIENT
Start: 2020-03-05 | End: 2020-03-05

## 2020-03-05 RX ORDER — ACETAMINOPHEN 325 MG/1
650 TABLET ORAL EVERY 6 HOURS PRN
Status: DISCONTINUED | OUTPATIENT
Start: 2020-03-06 | End: 2020-03-20 | Stop reason: HOSPADM

## 2020-03-05 RX ADMIN — Medication 10 ML: at 22:45

## 2020-03-05 RX ADMIN — LAMOTRIGINE 50 MG: 25 TABLET ORAL at 09:54

## 2020-03-05 RX ADMIN — CARBIDOPA AND LEVODOPA 1 TABLET: 25; 100 TABLET ORAL at 09:54

## 2020-03-05 RX ADMIN — IPRATROPIUM BROMIDE AND ALBUTEROL SULFATE 3 ML: 2.5; .5 SOLUTION RESPIRATORY (INHALATION) at 10:55

## 2020-03-05 RX ADMIN — IPRATROPIUM BROMIDE AND ALBUTEROL SULFATE 3 ML: 2.5; .5 SOLUTION RESPIRATORY (INHALATION) at 20:28

## 2020-03-05 RX ADMIN — IPRATROPIUM BROMIDE AND ALBUTEROL SULFATE 3 ML: 2.5; .5 SOLUTION RESPIRATORY (INHALATION) at 08:02

## 2020-03-05 RX ADMIN — SODIUM CHLORIDE: 9 INJECTION, SOLUTION INTRAVENOUS at 14:53

## 2020-03-05 RX ADMIN — PROPAFENONE HYDROCHLORIDE 150 MG: 150 TABLET, FILM COATED ORAL at 06:55

## 2020-03-05 RX ADMIN — CARBIDOPA AND LEVODOPA 1 TABLET: 25; 100 TABLET ORAL at 22:40

## 2020-03-05 RX ADMIN — IPRATROPIUM BROMIDE AND ALBUTEROL SULFATE 3 ML: 2.5; .5 SOLUTION RESPIRATORY (INHALATION) at 14:57

## 2020-03-05 RX ADMIN — POLYETHYLENE GLYCOL (3350) 17 G: 17 POWDER, FOR SOLUTION ORAL at 22:41

## 2020-03-05 RX ADMIN — ATORVASTATIN CALCIUM 20 MG: 10 TABLET, FILM COATED ORAL at 22:40

## 2020-03-05 RX ADMIN — MONTELUKAST SODIUM 10 MG: 10 TABLET, COATED ORAL at 22:40

## 2020-03-05 RX ADMIN — ACETAMINOPHEN 650 MG: 10 INJECTION, SOLUTION INTRAVENOUS at 22:39

## 2020-03-05 RX ADMIN — LAMOTRIGINE 50 MG: 25 TABLET ORAL at 22:40

## 2020-03-05 RX ADMIN — CARBIDOPA AND LEVODOPA 1 TABLET: 25; 100 TABLET ORAL at 14:49

## 2020-03-05 RX ADMIN — METOPROLOL TARTRATE 50 MG: 50 TABLET, FILM COATED ORAL at 09:54

## 2020-03-05 RX ADMIN — PROPAFENONE HYDROCHLORIDE 150 MG: 150 TABLET, FILM COATED ORAL at 22:40

## 2020-03-05 RX ADMIN — Medication 2 PUFF: at 20:28

## 2020-03-05 RX ADMIN — METOPROLOL TARTRATE 50 MG: 50 TABLET, FILM COATED ORAL at 22:40

## 2020-03-05 RX ADMIN — Medication 2 PUFF: at 08:02

## 2020-03-05 RX ADMIN — TRAMADOL HYDROCHLORIDE 50 MG: 50 TABLET, FILM COATED ORAL at 14:49

## 2020-03-05 RX ADMIN — ACETAMINOPHEN 650 MG: 10 INJECTION, SOLUTION INTRAVENOUS at 18:24

## 2020-03-05 RX ADMIN — HYDRALAZINE HYDROCHLORIDE 25 MG: 25 TABLET, FILM COATED ORAL at 22:40

## 2020-03-05 RX ADMIN — PROPAFENONE HYDROCHLORIDE 150 MG: 150 TABLET, FILM COATED ORAL at 14:49

## 2020-03-05 RX ADMIN — NIFEDIPINE 60 MG: 30 TABLET, FILM COATED, EXTENDED RELEASE ORAL at 09:54

## 2020-03-05 RX ADMIN — HYDRALAZINE HYDROCHLORIDE 25 MG: 25 TABLET, FILM COATED ORAL at 09:54

## 2020-03-05 RX ADMIN — DULOXETINE HYDROCHLORIDE 60 MG: 60 CAPSULE, DELAYED RELEASE ORAL at 09:54

## 2020-03-05 ASSESSMENT — PAIN SCALES - GENERAL
PAINLEVEL_OUTOF10: 6
PAINLEVEL_OUTOF10: 6

## 2020-03-05 NOTE — PROGRESS NOTES
Patient report given to Shan Jonas [facility]. Patient has rested comfortably overnight without acute changes in assessment noted. Care transferred.

## 2020-03-05 NOTE — PROGRESS NOTES
Occupational Therapy Daily Treatment Note    Unit: [facility]  Date:  3/5/2020  Patient Name:    Neda Jay  Admitting diagnosis:  Intractable pain [R52]  Intractable pain [R52]  Admit Date:  2/29/2020  Precautions/Restrictions:   fall risk, IV, bed/chair alarm, purewick and supplemental O2 (3L)      Discharge Recommendations:SNF  DME needs for discharge: defer to facility       Therapy recommendations for staff:   Assist of 2 (Max A) with use of STEDY  Or maxi lift  for all transfers to/from BSC/chair    History of Present Illness: 70 y. o. female for mechanical fall. Onset was today. Duration has been since the onset. Context includes pt states she has parkinson and is unsteady at baseline. She reports she was going to the car today and her legs gave out, she fell and hit her head and right posterior rib cage. Pt denies any light headedness or dizziness.  Alleviating factors include nothing.       PMHx: anemia, asthma, a fib, bakers cyst, blind in L eye, carotid stenosis, cervical spinal cord compression, chronic midline low back pain with R sided sciatica, CKD stage 3, CRI, detached retina L, diverticulosis, DJD, chronic edema, fatty liver, GERD, herniated lumbar intervertebral disc, hx of stage 2 decubitus ulcer, hyperlipidemia, HTN, BETSY with BiPAP, partial symptomatic epilepsy with complex partial seizures, RLS,  Tremor, Type 2 DM    AM-PAC Score: 13  Home Health S4 Level: NA       Treatment Time:  0323-7035  Treatment number:  4  Total Treatment Time:  30 minutes      Subjective:  Pt in the bed and agreeable to work with therapy    Pain   Yes  Rating: significant with movement   Location: rt rib cage   Pain Medicine Status: nurse aware    Bed Mobility:   Supine to Sit:  Not Tested  Sit to Supine:  NT  Rolling:           Max to mod to the left and pt unable to roll to the right with Max assist of one due to pain in Rt ribs   Scooting:        Max assist of two to head of bed    Transfer Training:   NT

## 2020-03-05 NOTE — PROGRESS NOTES
Pt resting with CPAP in place. No s/s of discomfort. Bedside report to Alejandro Miller RN and care of pt transferred.

## 2020-03-05 NOTE — PROGRESS NOTES
Cultures:  None    Films:  CT CHEST 2/29/20  Impression   Dependent airspace disease bilaterally, right greater than left.  Associated   small effusion on the right is noted.  Findings may be related to   atelectasis.  A component of contusion in the right lung base would be   difficult to exclude given the history of recent trauma.       No pneumothorax.       Small mediastinal and retrocrural nodes     Abdominal CT 3/4/2020   Impression:        Marked distension of the ascending and transverse colon with mild gradual  decompression distally.  Air-fluid levels are present throughout.  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 As the distal rectum/anus is outside the field of view, recommend rectal exam  to ensure there is no distal mass. Colonic diverticulosis. Small bilateral effusions, increased since 02/29/2020. CXR 3/3/20  FINDINGS:   Small to moderate pleural effusions bilaterally, greater on the left, new.    Pulmonary vascular congestion centrally.  No pneumothorax.  No acute bony   abnormality.           Impression   New bilateral pleural effusions.           ASSESSMENT:  · Fall  · Right lateral inferior chest wall pain  · Pulmonary contusion  · Pleural effusions -remains small on follow-up CT imaging  · Paroxysmal atrial fibrillation on Eliquis previously, restarted now  · Asthma treated by PCP, no acute exacerbation  · Ileus      PLAN:  · Inhaled corticosteroid and Singulair for asthma  · Scheduled bronchodilators  · Incentive spirometry  · Lidocaine patch  · Scheduled IV acetaminophen for next 36 hours to try to decrease use of PRN IV morphine  · Lasix per internal medicine

## 2020-03-05 NOTE — PROGRESS NOTES
Progress Note    Admit Date:  2/29/2020    Subjective:  Ms. Pia Alas is complaining of right chest wall pain with deep breaths    C/o abd distension and pain  Not passing gas     3/5   not passing gas yet. Had BM    Objective:   Patient Vitals for the past 4 hrs:   Resp SpO2   03/05/20 1057 18 92 %            Intake/Output Summary (Last 24 hours) at 3/5/2020 1311  Last data filed at 3/5/2020 0903  Gross per 24 hour   Intake 1831 ml   Output 500 ml   Net 1331 ml       Physical Exam:    Gen: No distress. Alert. Morbidly obese  female, resting in bed, spouse at bedside  Head: no obvious abrasions/lacerations noted  Eyes:  No sclera icterus. No conjunctival injection. Neck: Trachea midline. Resp: No accessory muscle use. Bibasilar crackles. No wheezes. No rhonchi. On supp O2, painful w/ inspiration  CV: Regular rate. Regular rhythm. No murmur. No rub. No edema   GI: distended,mildly tender, BS present   Skin: Warm and dry. No nodule on exposed extremities. No rash on exposed extremities. Small area of ecchymosis in right posterolateral chest wall  M/S: No cyanosis. No joint deformity. No clubbing. TTP along right posterior/lateral ribs  Neuro: Awake. Grossly nonfocal    Psych: No anxiety or agitation.          Scheduled Meds:   polyethylene glycol  17 g Oral BID    DULoxetine  60 mg Oral Daily    atorvastatin  20 mg Oral Nightly    carbidopa-levodopa  1 tablet Oral TID    hydrALAZINE  25 mg Oral BID    lamoTRIgine  50 mg Oral BID    metoprolol tartrate  50 mg Oral BID    montelukast  10 mg Oral Nightly    NIFEdipine  60 mg Oral Daily    propafenone  150 mg Oral 3 times per day    sodium chloride flush  10 mL Intravenous 2 times per day    lidocaine  1 patch Transdermal Daily    ipratropium-albuterol  1 vial Inhalation 4x daily    fluticasone  2 puff Inhalation BID       Continuous Infusions:   sodium chloride 75 mL/hr at 03/04/20 2204       PRN Meds:  albuterol sulfate HFA, sodium chloride flush, acetaminophen **OR** acetaminophen, promethazine **OR** ondansetron, traMADol **OR** traMADol, morphine **OR** morphine      Data:  CBC:   Recent Labs     03/03/20  0527 03/05/20  0800   WBC 12.6* 8.7   HGB 10.0* 9.7*   HCT 30.9* 29.8*   MCV 86.6 87.0    301     BMP:   Recent Labs     03/03/20  0527 03/04/20  0633 03/05/20  0800   * 131* 130*   K 4.2 4.3 4.3   CL 91* 92* 94*   CO2 25 24 23   BUN 36* 40* 41*   CREATININE 2.0* 1.9* 1.8*     LIVER PROFILE:   No results for input(s): AST, ALT, LIPASE, BILIDIR, BILITOT, ALKPHOS in the last 72 hours. Invalid input(s): AMYLASE,  ALB   RADIOLOGY  XR ABDOMEN (KUB) (SINGLE AP VIEW)   Final Result   Unchanged colonic ileus over the past 18 hours         XR ABDOMEN (KUB) (SINGLE AP VIEW)   Final Result   Gaseous distention of the colon without obstruction, possibly an adynamic   ileus or Inocencia's syndrome. CT ABDOMEN PELVIS WO CONTRAST Additional Contrast? None   Final Result   Marked distension of the ascending and transverse colon with mild gradual   decompression distally. Air-fluid levels are present throughout. 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 As the distal rectum/anus is outside the field of view, recommend rectal exam   to ensure there is no distal mass. Colonic diverticulosis. Small bilateral effusions, increased since 02/29/2020. XR CHEST PORTABLE   Final Result   New bilateral pleural effusions. CT CHEST WO CONTRAST   Final Result   Dependent airspace disease bilaterally, right greater than left. Associated   small effusion on the right is noted. Findings may be related to   atelectasis. A component of contusion in the right lung base would be   difficult to exclude given the history of recent trauma. No pneumothorax.       Small mediastinal and retrocrural nodes         CT Cervical Spine WO Contrast   Final Result   No acute abnormality of the cervical spine. CT Head WO Contrast   Final Result   No acute intracranial abnormality. XR CHEST PORTABLE   Final Result   No acute process. Assessment/Plan:    #Mechanical Fall  -Patient with Parkinson's disease who typically ambulates with a walker, had mechanical fall while walking without her walker using her 's assistance.  - PT/OT - recommending ARU     #Parkinson's Disease  - cont Sinemet     Chest wall pain 2 to lung contusion  #Right rib contusion  #Pleural Effusion - Right  - poss atelectasis vs contusion? No hemoptysis  - start IS  - monitor hemoglobin - 10.3 on arrival-> 10.1. Hemoglobin stable, hold eliquis   pulm consult  Restart eliquis     abd distension  CT a/p as above-  Distension of ascending and transverse colon  NPO  IVF  GI consult  Not passing gas     #Hypoxia  - suspect 2/2 above. Start IS and wean oxygen as able    #Hyperkalemia  - 5.3-> 4.8  - held aldactone  - daily BMP     #Mild FARHEEN on CKD stage III  - baseline Cr variable~  1.9 on 1/24/2020  - Cr on admission 2.1->2.3->2. 1  - s/p 500 cc IVF in ED  - hold Lasix and aldactone for now   D/c IVF  CRTN close to baseline     #Leukocytosis  - 15.3->11.7, appears chronically elevated  - no apparent infection     #PAF  - regular rate and rhythm on exam  - cont BB, Rythmol  - AC held 2/2 fall, CXR with effusion, poss contusion?  Hold eliquis     #Chronic Diastolic CHF  - appears compensated  - low Na diet, I&Os, daily weights  - cont BB, holding Lasix and  Aldactone for now  Hold diuretics as she is NPO     #Type II DM  - previous hx, not on diabetic meds  - last Hgb A1c: 6.4 on 1/24/20  - controlled with diet, carb control     #HTN  - stable  - cont Hydralazine, Lopressor, Nifedipine  -  lasix held for now & HCTZ     #HLD  - cont Lipitor     #COPD  - no AE  - cont HHN     #Chronic Normocytic Anemia  - Hgb 10.3  - stable, monitor      #Seizure DO  - seizure precautions  - cont Lamictal     #Morbid Obesity  - Body mass index is 41.8 kg/m². - Complicating assessment and treatment. Placing patient at risk for multiple co-morbidities as well as early death and contributing to the patient's presentation.   - Counseled on weight loss.      #BETSY  - BiPAP QHS    DVT Prophylaxis: SCD   Diet: Diet NPO Effective Now  Code Status: Full Code    ONOFRE Canales.

## 2020-03-06 ENCOUNTER — APPOINTMENT (OUTPATIENT)
Dept: GENERAL RADIOLOGY | Age: 80
DRG: 205 | End: 2020-03-06
Payer: MEDICARE

## 2020-03-06 LAB
ANION GAP SERPL CALCULATED.3IONS-SCNC: 16 MMOL/L (ref 3–16)
BUN BLDV-MCNC: 45 MG/DL (ref 7–20)
CALCIUM SERPL-MCNC: 8.2 MG/DL (ref 8.3–10.6)
CHLORIDE BLD-SCNC: 101 MMOL/L (ref 99–110)
CO2: 20 MMOL/L (ref 21–32)
CREAT SERPL-MCNC: 1.8 MG/DL (ref 0.6–1.2)
GFR AFRICAN AMERICAN: 33
GFR NON-AFRICAN AMERICAN: 27
GLUCOSE BLD-MCNC: 119 MG/DL (ref 70–99)
GLUCOSE BLD-MCNC: 127 MG/DL (ref 70–99)
GLUCOSE BLD-MCNC: 184 MG/DL (ref 70–99)
GLUCOSE BLD-MCNC: 67 MG/DL (ref 70–99)
GLUCOSE BLD-MCNC: 75 MG/DL (ref 70–99)
GLUCOSE BLD-MCNC: 88 MG/DL (ref 70–99)
PERFORMED ON: ABNORMAL
PERFORMED ON: NORMAL
POTASSIUM SERPL-SCNC: 4.2 MMOL/L (ref 3.5–5.1)
SODIUM BLD-SCNC: 137 MMOL/L (ref 136–145)

## 2020-03-06 PROCEDURE — 80048 BASIC METABOLIC PNL TOTAL CA: CPT

## 2020-03-06 PROCEDURE — 1200000000 HC SEMI PRIVATE

## 2020-03-06 PROCEDURE — 6360000002 HC RX W HCPCS: Performed by: INTERNAL MEDICINE

## 2020-03-06 PROCEDURE — 6370000000 HC RX 637 (ALT 250 FOR IP): Performed by: INTERNAL MEDICINE

## 2020-03-06 PROCEDURE — 94761 N-INVAS EAR/PLS OXIMETRY MLT: CPT

## 2020-03-06 PROCEDURE — 2580000003 HC RX 258: Performed by: PHYSICIAN ASSISTANT

## 2020-03-06 PROCEDURE — 2700000000 HC OXYGEN THERAPY PER DAY

## 2020-03-06 PROCEDURE — 99232 SBSQ HOSP IP/OBS MODERATE 35: CPT | Performed by: INTERNAL MEDICINE

## 2020-03-06 PROCEDURE — 36415 COLL VENOUS BLD VENIPUNCTURE: CPT

## 2020-03-06 PROCEDURE — 97535 SELF CARE MNGMENT TRAINING: CPT

## 2020-03-06 PROCEDURE — 2580000003 HC RX 258: Performed by: INTERNAL MEDICINE

## 2020-03-06 PROCEDURE — 94669 MECHANICAL CHEST WALL OSCILL: CPT

## 2020-03-06 PROCEDURE — 97530 THERAPEUTIC ACTIVITIES: CPT

## 2020-03-06 PROCEDURE — 97110 THERAPEUTIC EXERCISES: CPT

## 2020-03-06 PROCEDURE — 97112 NEUROMUSCULAR REEDUCATION: CPT

## 2020-03-06 PROCEDURE — 94150 VITAL CAPACITY TEST: CPT

## 2020-03-06 PROCEDURE — 94640 AIRWAY INHALATION TREATMENT: CPT

## 2020-03-06 PROCEDURE — 74019 RADEX ABDOMEN 2 VIEWS: CPT

## 2020-03-06 RX ORDER — DEXTROSE MONOHYDRATE 25 G/50ML
12.5 INJECTION, SOLUTION INTRAVENOUS PRN
Status: DISCONTINUED | OUTPATIENT
Start: 2020-03-06 | End: 2020-03-20 | Stop reason: HOSPADM

## 2020-03-06 RX ORDER — NICOTINE POLACRILEX 4 MG
15 LOZENGE BUCCAL PRN
Status: DISCONTINUED | OUTPATIENT
Start: 2020-03-06 | End: 2020-03-20 | Stop reason: HOSPADM

## 2020-03-06 RX ORDER — DEXTROSE MONOHYDRATE 50 MG/ML
100 INJECTION, SOLUTION INTRAVENOUS PRN
Status: DISCONTINUED | OUTPATIENT
Start: 2020-03-06 | End: 2020-03-20 | Stop reason: HOSPADM

## 2020-03-06 RX ADMIN — ACETAMINOPHEN 650 MG: 10 INJECTION, SOLUTION INTRAVENOUS at 15:25

## 2020-03-06 RX ADMIN — SODIUM CHLORIDE: 9 INJECTION, SOLUTION INTRAVENOUS at 15:19

## 2020-03-06 RX ADMIN — Medication 2 PUFF: at 20:08

## 2020-03-06 RX ADMIN — CARBIDOPA AND LEVODOPA 1 TABLET: 25; 100 TABLET ORAL at 14:53

## 2020-03-06 RX ADMIN — PROPAFENONE HYDROCHLORIDE 150 MG: 150 TABLET, FILM COATED ORAL at 14:53

## 2020-03-06 RX ADMIN — Medication 2 PUFF: at 06:58

## 2020-03-06 RX ADMIN — ATORVASTATIN CALCIUM 20 MG: 10 TABLET, FILM COATED ORAL at 22:34

## 2020-03-06 RX ADMIN — IPRATROPIUM BROMIDE AND ALBUTEROL SULFATE 3 ML: 2.5; .5 SOLUTION RESPIRATORY (INHALATION) at 06:58

## 2020-03-06 RX ADMIN — LAMOTRIGINE 50 MG: 25 TABLET ORAL at 22:34

## 2020-03-06 RX ADMIN — CARBIDOPA AND LEVODOPA 1 TABLET: 25; 100 TABLET ORAL at 22:35

## 2020-03-06 RX ADMIN — SODIUM CHLORIDE: 9 INJECTION, SOLUTION INTRAVENOUS at 22:36

## 2020-03-06 RX ADMIN — TRAMADOL HYDROCHLORIDE 100 MG: 50 TABLET, FILM COATED ORAL at 22:26

## 2020-03-06 RX ADMIN — NIFEDIPINE 60 MG: 30 TABLET, FILM COATED, EXTENDED RELEASE ORAL at 09:20

## 2020-03-06 RX ADMIN — MONTELUKAST SODIUM 10 MG: 10 TABLET, COATED ORAL at 22:35

## 2020-03-06 RX ADMIN — APIXABAN 0.5 MG: 5 TABLET, FILM COATED ORAL at 15:20

## 2020-03-06 RX ADMIN — HYDRALAZINE HYDROCHLORIDE 25 MG: 25 TABLET, FILM COATED ORAL at 22:34

## 2020-03-06 RX ADMIN — DEXTROSE MONOHYDRATE 12.5 G: 25 INJECTION, SOLUTION INTRAVENOUS at 10:31

## 2020-03-06 RX ADMIN — IPRATROPIUM BROMIDE AND ALBUTEROL SULFATE 3 ML: 2.5; .5 SOLUTION RESPIRATORY (INHALATION) at 20:08

## 2020-03-06 RX ADMIN — LAMOTRIGINE 50 MG: 25 TABLET ORAL at 09:20

## 2020-03-06 RX ADMIN — PROPAFENONE HYDROCHLORIDE 150 MG: 150 TABLET, FILM COATED ORAL at 22:34

## 2020-03-06 RX ADMIN — PROPAFENONE HYDROCHLORIDE 150 MG: 150 TABLET, FILM COATED ORAL at 15:20

## 2020-03-06 RX ADMIN — ACETAMINOPHEN 650 MG: 10 INJECTION, SOLUTION INTRAVENOUS at 10:57

## 2020-03-06 RX ADMIN — APIXABAN 2.5 MG: 5 TABLET, FILM COATED ORAL at 22:35

## 2020-03-06 RX ADMIN — POLYETHYLENE GLYCOL (3350) 17 G: 17 POWDER, FOR SOLUTION ORAL at 09:20

## 2020-03-06 RX ADMIN — IPRATROPIUM BROMIDE AND ALBUTEROL SULFATE 3 ML: 2.5; .5 SOLUTION RESPIRATORY (INHALATION) at 16:20

## 2020-03-06 RX ADMIN — HYDRALAZINE HYDROCHLORIDE 25 MG: 25 TABLET, FILM COATED ORAL at 09:20

## 2020-03-06 RX ADMIN — METOPROLOL TARTRATE 50 MG: 50 TABLET, FILM COATED ORAL at 22:33

## 2020-03-06 RX ADMIN — ACETAMINOPHEN 650 MG: 10 INJECTION, SOLUTION INTRAVENOUS at 06:49

## 2020-03-06 RX ADMIN — CARBIDOPA AND LEVODOPA 1 TABLET: 25; 100 TABLET ORAL at 09:20

## 2020-03-06 RX ADMIN — IPRATROPIUM BROMIDE AND ALBUTEROL SULFATE 3 ML: 2.5; .5 SOLUTION RESPIRATORY (INHALATION) at 11:11

## 2020-03-06 RX ADMIN — METOPROLOL TARTRATE 50 MG: 50 TABLET, FILM COATED ORAL at 09:20

## 2020-03-06 RX ADMIN — PROPAFENONE HYDROCHLORIDE 150 MG: 150 TABLET, FILM COATED ORAL at 06:49

## 2020-03-06 RX ADMIN — ACETAMINOPHEN 650 MG: 10 INJECTION, SOLUTION INTRAVENOUS at 03:26

## 2020-03-06 RX ADMIN — DULOXETINE HYDROCHLORIDE 60 MG: 60 CAPSULE, DELAYED RELEASE ORAL at 09:20

## 2020-03-06 ASSESSMENT — PAIN DESCRIPTION - PROGRESSION
CLINICAL_PROGRESSION: NOT CHANGED

## 2020-03-06 ASSESSMENT — PAIN DESCRIPTION - PAIN TYPE
TYPE: ACUTE PAIN
TYPE: ACUTE PAIN

## 2020-03-06 ASSESSMENT — PAIN DESCRIPTION - LOCATION
LOCATION: RIB CAGE
LOCATION: RIB CAGE

## 2020-03-06 ASSESSMENT — PAIN DESCRIPTION - ORIENTATION
ORIENTATION: RIGHT
ORIENTATION: RIGHT

## 2020-03-06 ASSESSMENT — PAIN SCALES - GENERAL
PAINLEVEL_OUTOF10: 7
PAINLEVEL_OUTOF10: 5

## 2020-03-06 NOTE — PROGRESS NOTES
Pulmonary Progress Note  CC: Chest pain after fall    Subjective:  Abdominal pain seems better  Still with right lateral chest wall pain on deep inspiration -she does not think the IV Tylenol helped very much    IV line peripheral    EXAM:   /60   Pulse 72   Temp 97.4 °F (36.3 °C) (Oral)   Resp 16   Ht 5' 6\" (1.676 m)   Wt 259 lb (117.5 kg)   SpO2 98%   BMI 41.80 kg/m²  on 3 L  Constitutional:  No acute distress. She is standing at bedside with physical therapy  HEENT: no scleral icterus  Neck: No tracheal deviation present. Cardiovascular: Normal heart sounds. Pulmonary/Chest: No wheezes. No rhonchi. No rales. + decreased breath sounds. No accessory muscle usage or stridor. Abdominal: Soft. Not distended  Musculoskeletal: No cyanosis. No clubbing. Skin: Skin is warm and dry.      Scheduled Meds:   apixaban  2.5 mg Oral BID    acetaminophen  650 mg Intravenous Q4H    polyethylene glycol  17 g Oral BID    DULoxetine  60 mg Oral Daily    atorvastatin  20 mg Oral Nightly    carbidopa-levodopa  1 tablet Oral TID    hydrALAZINE  25 mg Oral BID    lamoTRIgine  50 mg Oral BID    metoprolol tartrate  50 mg Oral BID    montelukast  10 mg Oral Nightly    NIFEdipine  60 mg Oral Daily    propafenone  150 mg Oral 3 times per day    sodium chloride flush  10 mL Intravenous 2 times per day    lidocaine  1 patch Transdermal Daily    ipratropium-albuterol  1 vial Inhalation 4x daily    fluticasone  2 puff Inhalation BID     Continuous Infusions:   dextrose      sodium chloride 75 mL/hr at 03/05/20 1453     PRN Meds:  glucose, dextrose, glucagon (rDNA), dextrose, acetaminophen **OR** acetaminophen, albuterol sulfate HFA, sodium chloride flush, promethazine **OR** ondansetron, traMADol **OR** traMADol, morphine **OR** morphine    Labs:  CBC:   Recent Labs     03/05/20  0800   WBC 8.7   HGB 9.7*   HCT 29.8*   MCV 87.0        BMP:   Recent Labs     03/04/20  0633 03/05/20  0800 03/06/20  0539   * 130* 137   K 4.3 4.3 4.2   CL 92* 94* 101   CO2 24 23 20*   BUN 40* 41* 45*   CREATININE 1.9* 1.8* 1.8*       Cultures:  None    Films:  CT CHEST 2/29/20  Impression   Dependent airspace disease bilaterally, right greater than left.  Associated   small effusion on the right is noted.  Findings may be related to   atelectasis.  A component of contusion in the right lung base would be   difficult to exclude given the history of recent trauma.       No pneumothorax.       Small mediastinal and retrocrural nodes     Abdominal CT 3/4/2020   Impression:        Marked distension of the ascending and transverse colon with mild gradual  decompression distally.  Air-fluid levels are present throughout.  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 As the distal rectum/anus is outside the field of view, recommend rectal exam  to ensure there is no distal mass. Colonic diverticulosis. Small bilateral effusions, increased since 02/29/2020. CXR 3/3/20  FINDINGS:   Small to moderate pleural effusions bilaterally, greater on the left, new.    Pulmonary vascular congestion centrally.  No pneumothorax.  No acute bony   abnormality.           Impression   New bilateral pleural effusions.           ASSESSMENT:  · Fall  · Right lateral inferior chest wall pain  · Pulmonary contusion  · Pleural effusions -remains small on follow-up CT imaging  · Paroxysmal atrial fibrillation on Eliquis previously, restarted now  · Asthma treated by PCP, no acute exacerbation  · Ileus      PLAN:  · Inhaled corticosteroid and Singulair for asthma  · Scheduled bronchodilators  · Incentive spirometry  · Lidocaine patch  · Lasix per internal medicine

## 2020-03-06 NOTE — PROGRESS NOTES
Shift assessment complete; see flow sheet. Scheduled medications administered; See MAR. IV infusing without difficulty. O2 3 LPM nc in place. Pt c/o abdominal and back pain 5/10 PRN acetaminophen given at this time. Pt denies any needs at this time.  Pt educated on use of call light and to call out with needs, verbalized understanding, bed in low locked position for pt safety

## 2020-03-06 NOTE — PROGRESS NOTES
Inpatient Physical Therapy Daily Treatment Note    Unit: [facility]  Date:  3/6/2020  Patient Name:    Brayan Munoz  Admitting diagnosis:  Intractable pain [R52]  Intractable pain [R52]  Admit Date:  2020  Precautions/Restrictions:  fall risk, IV, bed/chair alarm, supplemental O2 (3L), continuous pulse ox and purewick catheter      Discharge Recommendations: SNF  DME needs for discharge: defer to facility       Therapy recommendation for EMS Transport: requires transport by cot due to difficulty in transfers. Therapy recommendations for staff:   Assist of 2 with use of STEDY for all transfers to/from BSC/chair    History of Present Illness: Pt with  Hx of PD was attempting to transfer to her car in her garage when she states her legs gave out and she fell, hitting her side on the running board of her car.  Severe pain.  Pt was unable to ambulate following the fall 2/2 pain & weakness. Work up in the ED showed a small effusion on the right poss related to atelectasis. CT cervical spine and head were negative. PMH: Parkinson's Disease, Asthma, PAF, CKD stage III, DM controlled with diet, HTN, HLD     Home Health S4 Level Recommendation: NA  AM-PAC Mobility Score   AM-PAC Inpatient Mobility Raw Score : 10     Treatment Time: 7780 - 7736  Treatment number: 4  Timed Code Treatment Minutes: 50 minutes  Total Treatment Minutes: 50 minutes    Cognition    A&O x4   Able to follow 2 step commands    Subjective  Patient lying supine in bed with no family present  Pt agreeable to this PT tx. Pain   Yes  Location: R posterolateral rib cage   Ratin/10  Pain Medicine Status: Received pain med prior to tx     Bed Mobility   Supine to Sit:   Max A  Sit to Supine:  Max A  Rolling: Max A  Scooting: Max A    Transfer Training     Sit to stand:   Min A of 2  Stand to sit:   Min A of 2  Bed to Chair:  Mod A of 2 with use of STEDY    Gait Training gait deferred due to difficulty with transfers; pt ambulated 0 ft. Stair Training deferred, pt unsafe/not appropriate to complete stairs at this time    Therapeutic Exercise all completed bilaterally unless indicated for 10 reps  Ankle pumps:  Heel slides:   Hip abd/add:   LAQ:     Balance  Static Sitting:  Good -    Tolerance:   Dynamic Sitting:  Fair +  Static Standing: Poor +   Tolerance: 5 min, 40 sec, 1 min   Dynamic Standing: Poor    Patient Education      Role of PT, POC, Discharge recommendations, DC recommendations, safety awareness, transfer techniques, pursed lip breathing, energy conservation, pacing activity and calling for assist with mobility. Positioning Needs       Pt instructed and educated on use of call light to call for assist if desiring to get up or change position, call light handed to pt and needs within reach, Pt returned to bed, alarm set and call light provided and all needs within reach. ROM Measurements N/A  Knee Flexion:  Knee Extension:     Activity Tolerance   Pt completed therapy session with Pain noted with deep breathing, coughing, sit to stand, rolling in the bed. SOB noted with functional transfers and standing activity. Tata Martoclby SpO2: 94%. Assessment :  Patient was able to tolerate sit to stand with Stedy with 2 person assist with no buckling at the bilateral knees. Patient started leaning forward more on the Jimmy Labella after her 5 min standing. Patient did have pain in the rib cage while pull to stand and needed assist to avoid using RUE for pulling. Patient maintained SpO2 above 90% throughout the session. Patient needed regular rest breaks throughout the session. Patient will continue to benefit from SNF upon discharge to address the functional deficits. Goals (all goals ongoing unless otherwise indicated)  To be met in 3 visits:  1). Independent with LE Ex x 10 reps     To be met in 6 visits:  1).  Supine to/from sit: MIN A  2).  Sit to/from stand: Min A  3).  Bed to chair: Min A  4).   Gait: Ambulate 50 ft.  with  Min A  and use of

## 2020-03-06 NOTE — PROGRESS NOTES
Occupational Therapy Daily Treatment Note    Unit: [facility]  Date:  3/6/2020  Patient Name:    Patric Multani  Admitting diagnosis:  Intractable pain [R52]  Intractable pain [R52]  Admit Date:  2020  Precautions/Restrictions:  fall risk, IV, bed/chair alarm, supplemental O2 (3L) and purewick catheter      Discharge Recommendations: SNF  DME needs for discharge: defer to facility       Therapy recommendations for staff:   Assist of 2 (minimal assist) with use of STEDY for all transfers to/from BSC/chair    AM-PAC Score: 13  Home Health S4 Level: NA       Treatment Time:    Treatment number: 5    Total Treatment Time:  50 minutes      Subjective:  Pt supine in bed; agreeable to therapy, very pleasant    Pain   Yes  Ratin at rest, 10 with movement  Location:Right rib  Pain Medicine Status: Denies need      Bed Mobility:   Supine to Sit:  Max A of 1  + Min of another with VC  Sit to Supine: Mod A and Min A of 2  Rolling:           Min A  Scooting: Mod A of 2 to [facility] with use of rails/headboard    Transfer Training:   Sit to stand: Mod A of 2 with STEDY  Stand to sit:  Min A of 2  Bed to Chair:  Not Tested  Bed to [facility]:   Not Tested  Standard toilet:   Not Tested    Activity Tolerance   Pt stood in STEDY x 3 bouts; x 5 min, then 40 seconds, then 2 min with seated rest between  Pt completed therapy session with Pain noted with moderate increase with movement      ADL Training:   Upper body dressing:  Not Tested  Upper body bathing:  Not Tested  Lower body dressing:  Max A for depends  Lower body bathing:  Max A for korey care in stance (STEDY)  Toileting:   Not Tested  Grooming/Hygiene:  Not Tested    Therapeutic Exercise:   N/A    Patient Education:   Role of OT  Recommendations for DC    Positioning Needs: In bed, call light and needs in reach. Alarm Set    Family Present:  No    Assessment: Pt with good participation in therapy tasks this date.  Pt stood for 3 bouts with assist, and required

## 2020-03-06 NOTE — PROGRESS NOTES
Handoff report and transfer of care given at bedside to [facility]. Patient in stable condition, denies needs/concerns at this time. Call light within reach.

## 2020-03-06 NOTE — PROGRESS NOTES
Assisted Pt to chair. Pt demonstrates ability to use IS. Pt tolerated transfer fairly well. Pt reports some relief of pain after acetaminophen gtt started last night. Pt states she really wants to start eating. Will f/u with MD. BG 67 this AM. Pt is asymptomatic. New orders for hypoglycemia mngt. Pt resting in chair with call light in reach. Family at bedside.

## 2020-03-06 NOTE — PROGRESS NOTES
PROGRESS NOTE  S:79 yrs Patient  admitted on 2/29/2020 with Intractable pain [R52]  Intractable pain [R52] . Today he complains of Abdominal Pain and Bloating    Exam:   Vitals:    03/06/20 1112   BP:    Pulse:    Resp:    Temp:    SpO2: 98%      General appearance: alert, appears stated age, cooperative, moderate distress and morbidly obese  HEENT: PERRLA  Neck: no adenopathy and supple, symmetrical, trachea midline  Lungs: clear to auscultation bilaterally  Heart: regular rate and rhythm, S1, S2 normal, no murmur, click, rub or gallop  Abdomen: abnormal findings:  distended, hypoactive bowel sounds, obese and tenderness moderate in the entire abdomen  Extremities: extremities normal, atraumatic, no cyanosis or edema     Medications: Reviewed    Labs:  CBC:   Recent Labs     03/05/20  0800   WBC 8.7   HGB 9.7*   HCT 29.8*   MCV 87.0        BMP:   Recent Labs     03/04/20  0633 03/05/20  0800 03/06/20  0539   * 130* 137   K 4.3 4.3 4.2   CL 92* 94* 101   CO2 24 23 20*   BUN 40* 41* 45*   CREATININE 1.9* 1.8* 1.8*     Attending Supervising [de-identified] Attestation Statement  The patient is a 70 y.o. female. I have performed a history and physical examination of the patient. I discussed the case with my physician assistant Amaury Lemus PA-C    I reviewed the patient's Past Medical History, Past Surgical History, Medications, and Allergies.      Physical Exam:  Vitals:    03/06/20 0830 03/06/20 1112 03/06/20 1349 03/06/20 1622   BP: (!) 179/75  139/60    Pulse: 78  72    Resp: 18  16    Temp: 96.6 °F (35.9 °C)  97.4 °F (36.3 °C)    TempSrc: Oral  Oral    SpO2: 98% 98% 98% 97%   Weight:       Height:           Physical Examination: General appearance - alert, well appearing, and in no distress  Mental status - alert, oriented to person, place, and time  Eyes - pupils equal and reactive, extraocular eye movements intact  Neck - supple, no significant adenopathy  Chest - clear to auscultation, no wheezes, rales or rhonchi, symmetric air entry  Heart - normal rate, regular rhythm, normal S1, S2, no murmurs, rubs, clicks or gallops  Abdomen - nontender, distended,   Extremities - peripheral pulses normal, no pedal edema, no clubbing or cyanosis      Impression: 82-year-old female with a history of anemia, asthma, atrial fibrillation, CKD, diabetes, diverticulosis, GERD, HTN, HLD, and hypothyroidism admitted with fall. CT abd for distention showed colonic ileus. She admits to passing gas today and a large BM. Her abdominal distension is improving. Recommendation:  1. Continue supportive care  2. Await KUB  3. NPO, IVF  4. PT/OT  5. Minimize narcotics  6. MiraLax BID  7. Will follow      Porfirio Jacobson PA-C  11:57 AM 3/6/2020                      82-year-old female with a history of anemia, asthma, atrial fibrillation, CKD, diabetes, diverticulosis, GERD, HTN, HLD, and hypothyroidism admitted with fall complicated by colon pseudobstruction. PT/OT. Monitor and replenish electrolytes, miralax BID, minimize narcotics.  If no improvement, will consider colonic decompression    Ifeoma Barrientos MD          (O) [phone]

## 2020-03-07 ENCOUNTER — APPOINTMENT (OUTPATIENT)
Dept: GENERAL RADIOLOGY | Age: 80
DRG: 205 | End: 2020-03-07
Payer: MEDICARE

## 2020-03-07 LAB
ANION GAP SERPL CALCULATED.3IONS-SCNC: 11 MMOL/L (ref 3–16)
BUN BLDV-MCNC: 28 MG/DL (ref 7–20)
CALCIUM SERPL-MCNC: 8.5 MG/DL (ref 8.3–10.6)
CHLORIDE BLD-SCNC: 105 MMOL/L (ref 99–110)
CO2: 22 MMOL/L (ref 21–32)
CREAT SERPL-MCNC: 1.3 MG/DL (ref 0.6–1.2)
GFR AFRICAN AMERICAN: 48
GFR NON-AFRICAN AMERICAN: 40
GLUCOSE BLD-MCNC: 100 MG/DL (ref 70–99)
GLUCOSE BLD-MCNC: 101 MG/DL (ref 70–99)
GLUCOSE BLD-MCNC: 138 MG/DL (ref 70–99)
GLUCOSE BLD-MCNC: 141 MG/DL (ref 70–99)
GLUCOSE BLD-MCNC: 156 MG/DL (ref 70–99)
GLUCOSE BLD-MCNC: 84 MG/DL (ref 70–99)
GLUCOSE BLD-MCNC: 98 MG/DL (ref 70–99)
MAGNESIUM: 2.4 MG/DL (ref 1.8–2.4)
PERFORMED ON: ABNORMAL
PERFORMED ON: NORMAL
PERFORMED ON: NORMAL
POTASSIUM SERPL-SCNC: 3.8 MMOL/L (ref 3.5–5.1)
SODIUM BLD-SCNC: 138 MMOL/L (ref 136–145)
TSH SERPL DL<=0.05 MIU/L-ACNC: 1.61 UIU/ML (ref 0.27–4.2)

## 2020-03-07 PROCEDURE — 94640 AIRWAY INHALATION TREATMENT: CPT

## 2020-03-07 PROCEDURE — 83735 ASSAY OF MAGNESIUM: CPT

## 2020-03-07 PROCEDURE — 6370000000 HC RX 637 (ALT 250 FOR IP): Performed by: INTERNAL MEDICINE

## 2020-03-07 PROCEDURE — 1200000000 HC SEMI PRIVATE

## 2020-03-07 PROCEDURE — 80048 BASIC METABOLIC PNL TOTAL CA: CPT

## 2020-03-07 PROCEDURE — 6360000002 HC RX W HCPCS: Performed by: INTERNAL MEDICINE

## 2020-03-07 PROCEDURE — 74018 RADEX ABDOMEN 1 VIEW: CPT

## 2020-03-07 PROCEDURE — 97530 THERAPEUTIC ACTIVITIES: CPT

## 2020-03-07 PROCEDURE — 99232 SBSQ HOSP IP/OBS MODERATE 35: CPT | Performed by: INTERNAL MEDICINE

## 2020-03-07 PROCEDURE — 94761 N-INVAS EAR/PLS OXIMETRY MLT: CPT

## 2020-03-07 PROCEDURE — 2700000000 HC OXYGEN THERAPY PER DAY

## 2020-03-07 PROCEDURE — 84443 ASSAY THYROID STIM HORMONE: CPT

## 2020-03-07 PROCEDURE — 2580000003 HC RX 258: Performed by: INTERNAL MEDICINE

## 2020-03-07 PROCEDURE — 36415 COLL VENOUS BLD VENIPUNCTURE: CPT

## 2020-03-07 PROCEDURE — 97535 SELF CARE MNGMENT TRAINING: CPT

## 2020-03-07 PROCEDURE — 94669 MECHANICAL CHEST WALL OSCILL: CPT

## 2020-03-07 RX ORDER — BISACODYL 10 MG
10 SUPPOSITORY, RECTAL RECTAL DAILY
Status: DISCONTINUED | OUTPATIENT
Start: 2020-03-07 | End: 2020-03-20 | Stop reason: HOSPADM

## 2020-03-07 RX ADMIN — CARBIDOPA AND LEVODOPA 1 TABLET: 25; 100 TABLET ORAL at 14:13

## 2020-03-07 RX ADMIN — APIXABAN 2.5 MG: 5 TABLET, FILM COATED ORAL at 09:13

## 2020-03-07 RX ADMIN — DULOXETINE HYDROCHLORIDE 60 MG: 60 CAPSULE, DELAYED RELEASE ORAL at 09:13

## 2020-03-07 RX ADMIN — APIXABAN 2.5 MG: 5 TABLET, FILM COATED ORAL at 22:24

## 2020-03-07 RX ADMIN — TRAMADOL HYDROCHLORIDE 100 MG: 50 TABLET, FILM COATED ORAL at 05:25

## 2020-03-07 RX ADMIN — MORPHINE SULFATE 2 MG: 2 INJECTION, SOLUTION INTRAMUSCULAR; INTRAVENOUS at 17:30

## 2020-03-07 RX ADMIN — CARBIDOPA AND LEVODOPA 1 TABLET: 25; 100 TABLET ORAL at 09:13

## 2020-03-07 RX ADMIN — IPRATROPIUM BROMIDE AND ALBUTEROL SULFATE 3 ML: 2.5; .5 SOLUTION RESPIRATORY (INHALATION) at 20:29

## 2020-03-07 RX ADMIN — MONTELUKAST SODIUM 10 MG: 10 TABLET, COATED ORAL at 22:24

## 2020-03-07 RX ADMIN — TRAMADOL HYDROCHLORIDE 100 MG: 50 TABLET, FILM COATED ORAL at 22:24

## 2020-03-07 RX ADMIN — ATORVASTATIN CALCIUM 20 MG: 10 TABLET, FILM COATED ORAL at 22:22

## 2020-03-07 RX ADMIN — LAMOTRIGINE 50 MG: 25 TABLET ORAL at 22:23

## 2020-03-07 RX ADMIN — NIFEDIPINE 60 MG: 30 TABLET, FILM COATED, EXTENDED RELEASE ORAL at 09:14

## 2020-03-07 RX ADMIN — CARBIDOPA AND LEVODOPA 1 TABLET: 25; 100 TABLET ORAL at 22:22

## 2020-03-07 RX ADMIN — PROPAFENONE HYDROCHLORIDE 150 MG: 150 TABLET, FILM COATED ORAL at 05:24

## 2020-03-07 RX ADMIN — Medication 2 PUFF: at 07:26

## 2020-03-07 RX ADMIN — LAMOTRIGINE 50 MG: 25 TABLET ORAL at 09:13

## 2020-03-07 RX ADMIN — HYDRALAZINE HYDROCHLORIDE 25 MG: 25 TABLET, FILM COATED ORAL at 09:13

## 2020-03-07 RX ADMIN — Medication 10 ML: at 17:30

## 2020-03-07 RX ADMIN — PROPAFENONE HYDROCHLORIDE 150 MG: 150 TABLET, FILM COATED ORAL at 22:22

## 2020-03-07 RX ADMIN — METOPROLOL TARTRATE 50 MG: 50 TABLET, FILM COATED ORAL at 22:22

## 2020-03-07 RX ADMIN — HYDRALAZINE HYDROCHLORIDE 25 MG: 25 TABLET, FILM COATED ORAL at 22:22

## 2020-03-07 RX ADMIN — PROPAFENONE HYDROCHLORIDE 150 MG: 150 TABLET, FILM COATED ORAL at 17:40

## 2020-03-07 RX ADMIN — Medication 2 PUFF: at 20:30

## 2020-03-07 RX ADMIN — TRAMADOL HYDROCHLORIDE 100 MG: 50 TABLET, FILM COATED ORAL at 14:13

## 2020-03-07 RX ADMIN — SODIUM CHLORIDE: 9 INJECTION, SOLUTION INTRAVENOUS at 22:25

## 2020-03-07 RX ADMIN — IPRATROPIUM BROMIDE AND ALBUTEROL SULFATE 3 ML: 2.5; .5 SOLUTION RESPIRATORY (INHALATION) at 07:26

## 2020-03-07 RX ADMIN — METOPROLOL TARTRATE 50 MG: 50 TABLET, FILM COATED ORAL at 09:13

## 2020-03-07 RX ADMIN — IPRATROPIUM BROMIDE AND ALBUTEROL SULFATE 3 ML: 2.5; .5 SOLUTION RESPIRATORY (INHALATION) at 11:29

## 2020-03-07 ASSESSMENT — PAIN DESCRIPTION - PROGRESSION
CLINICAL_PROGRESSION: NOT CHANGED

## 2020-03-07 ASSESSMENT — PAIN DESCRIPTION - LOCATION
LOCATION: RIB CAGE
LOCATION: RIB CAGE

## 2020-03-07 ASSESSMENT — PAIN SCALES - GENERAL
PAINLEVEL_OUTOF10: 8
PAINLEVEL_OUTOF10: 7
PAINLEVEL_OUTOF10: 10
PAINLEVEL_OUTOF10: 4
PAINLEVEL_OUTOF10: 10
PAINLEVEL_OUTOF10: 8

## 2020-03-07 ASSESSMENT — PAIN DESCRIPTION - ORIENTATION
ORIENTATION: RIGHT
ORIENTATION: RIGHT

## 2020-03-07 ASSESSMENT — PAIN DESCRIPTION - PAIN TYPE
TYPE: ACUTE PAIN
TYPE: ACUTE PAIN

## 2020-03-07 NOTE — PROGRESS NOTES
with use of STEDY chair to [facility] then [facility] to bed    Gait Training gait deferred due to difficulty with transfers; pt ambulated 0 ft. Stair Training deferred, pt unsafe/not appropriate to complete stairs at this time    Therapeutic Exercise Veena deferred for focus on functional mobility (disregard section)   Ankle pumps:  Heel slides:   Hip abd/add:   LAQ:     Balance  Static Sitting:  Good -    Tolerance:   Dynamic Sitting:  Fair +  Static Standing: Poor +   Tolerance:    Dynamic Standing: Poor    Patient Education      Role of PT, POC, Discharge recommendations, DC recommendations, safety awareness, transfer techniques, pursed lip breathing, energy conservation, pacing activity and calling for assist with mobility. Positioning Needs       Pt instructed and educated on use of call light to call for assist if desiring to get up or change position, call light handed to pt and needs within reach, Pt returned to bed, alarm set and call light provided and all needs within reach. ROM Measurements N/A  Knee Flexion:  Knee Extension:     Activity Tolerance   Pt completed therapy session with Pain noted with any movement. Fatigue  SpO2:     Assessment :  Patient reports pain with all movement, limited 2/2 pain. Fatigued with activity, required frequent rest breaks. PT continuing to recommend SNF at D/C to promote functional return. Goals (all goals ongoing unless otherwise indicated)  To be met in 3 visits:  1). Independent with LE Ex x 10 reps     To be met in 6 visits:  1).  Supine to/from sit: Min A  2).  Sit to/from stand: Min A  3).  Bed to chair: Min A  4).  Gait: Ambulate 50 ft.  with  Min A  and use of rolling walker (RW)  5).  Tolerate B LE exercises 3 sets of 10-15 reps  6). Ascend/descend 1 step without rail, with Min A and use of RW. Plan   Continue with plan of care.     Oliva Redd, PT, DPT, OMT-C  #[id number]        If patient discharges from this facility prior to next visit, this note will serve as the Discharge Summary.

## 2020-03-07 NOTE — PROGRESS NOTES
CERVICAL FUSION  11/2010    CHOLECYSTECTOMY      CORNEAL TRANSPLANT      EYE SURGERY      HERNIA REPAIR      HYSTERECTOMY      JOINT REPLACEMENT Bilateral 2003    KNEE SURGERY      replacement x 2    SHOULDER SURGERY      TONSILLECTOMY      TUBAL LIGATION      VITRECTOMY         Level of Consciousness: Alert, Oriented, and Cooperative = 0    Level of Activity: Bedridden, unresponsive or quadriplegic = 4    Respiratory Pattern: Dyspnea with exertion;Irregular pattern;or RR less than 6 = 2    Breath Sounds: Diminshed bilaterally and/or crackles = 2    Sputum   ,  , Sputum How Obtained: Spontaneous cough  Cough: Strong, spontaneous, non-productive = 0    Vital Signs   /60   Pulse 72   Temp 97.4 °F (36.3 °C) (Oral)   Resp 16   Ht 5' 6\" (1.676 m)   Wt 259 lb (117.5 kg)   SpO2 95%   BMI 41.80 kg/m²   SPO2 (COPD values may differ): 88-89% on room air or greater than 92% on FiO2 28- 35% = 2    Peak Flow (asthma only): not applicable = 0    RSI: 91-17 = Q6H or QID and Q4HPRN for dyspnea        Plan       Goals: medication delivery    Patient/caregiver was educated on the proper method of use for Respiratory Care Devices:  Yes      Level of patient/caregiver understanding able to:   ? Verbalize understanding   ? Demonstrate understanding       ? Teach back        ? Needs reinforcement       ? No available caregiver               ? Other:     Response to education:  Good     Is patient being placed on Home Treatment Regimen? No     Does the patient have everything they need prior to discharge? NA     Comments: Chart reviewed and patient assessed. Plan of Care: Duoneb QID,  Flovent 110   2PUFFS BID, Albuterol 2puffs Q6PRN. Electronically signed by Sophia Lynn RCP on 3/6/2020 at 9:44 PM    Respiratory Protocol Guidelines     1.  Assessment and treatment by Respiratory Therapy will be initiated for medication and therapeutic interventions upon initiation of aerosolized bronchodilator. 2. Discontinue if patient experiences worsened bronchospasm, or secretions have lessened to the point that the patient is able to clear them with a cough. Anti-inflammatory and Combination Medications:    1. If the patient lacks prior history of lung disease, is not using inhaled anti-inflammatory medication at home, and lacks wheezing by examination or by history for at least 24 hours, contact physician for possible discontinuation.

## 2020-03-07 NOTE — PROGRESS NOTES
PM assessment completed. See flow sheet. Pt A&O X 4. Resp E&E with no distress noted. PRN pain meds given per request, pt rated rt rib pain 7/10. SEE MAR Pt.denies any further needs at this time. Call light within reach. Will continue to monitor.

## 2020-03-07 NOTE — PROGRESS NOTES
Care Coordination, Acute Rehab     After review, this patient is felt to be:      [x]  Appropriate for Acute Inpatient Rehab    []  Appropriate for Acute Inpatient Rehab Pending Insurance Authorization    []  Not appropriate for Acute Inpatient Rehab    []  Not appropriate at this time, however evaluation ongoing    Will continue following this patient for inpatient rehab. Feel free to call us when patient is medically ready for discharge.  Electronically signed by Emmett Ruby RN on 3/7/2020 at 2:30 PM

## 2020-03-07 NOTE — PROGRESS NOTES
Her abdominal distension is stable. Discomfort is minimal and she does not experience nausea or emesis.   - obtain MG and TSH levels  - in evaluation of anemia obtain fe studies  - NPO  - repeat KUB in am  - d/c MiraLax  - consider an alternative to morphine and Ultram  - keep well hydrated

## 2020-03-07 NOTE — PROGRESS NOTES
Care Coordination, Acute Rehab     After review, this patient is felt to be:      [x]  Appropriate for Acute Inpatient Rehab    []  Appropriate for Acute Inpatient Rehab Pending Insurance Authorization    []  Not appropriate for Acute Inpatient Rehab    []  Not appropriate at this time, however evaluation ongoing      Patient is appropriate for IPR.  Electronically signed by Aren Matias RN on 3/7/2020 at 9:54 AM

## 2020-03-07 NOTE — PROGRESS NOTES
resrarted eliquis         #Chronic Diastolic CHF  - appears compensated  - low Na diet, I&Os, daily weights  - cont BB, holding Lasix and  Aldactone for now  Hold diuretics as she is NPO         #Type II DM  - previous hx, not on diabetic meds  - last Hgb A1c: 6.4 on 1/24/20  - controlled with diet, carb control       #HTN  - stable  - cont Hydralazine, Lopressor, Nifedipine  -  lasix held for now & HCTZ       #HLD  - cont Lipitor       #COPD  - no AE  - cont HHN       #Chronic Normocytic Anemia  - Hgb 10.3  - stable, monitor        #Seizure DO  - seizure precautions  - cont Lamictal       #Morbid Obesity  - Body mass index is 41.8 kg/m². - Complicating assessment and treatment.  Placing patient at risk for multiple co-morbidities as well as early death and contributing to the patient's presentation.   - Counseled on weight loss.        #BETSY  - BiPAP QHS      DVT Prophylaxis: SCD   Diet: Diet NPO Effective Now  Code Status: Full Code        Melissa Covert  3/7/2020  5:43 PM

## 2020-03-08 LAB
ALBUMIN SERPL-MCNC: 3 G/DL (ref 3.4–5)
ANION GAP SERPL CALCULATED.3IONS-SCNC: 12 MMOL/L (ref 3–16)
BUN BLDV-MCNC: 21 MG/DL (ref 7–20)
CALCIUM SERPL-MCNC: 8.3 MG/DL (ref 8.3–10.6)
CHLORIDE BLD-SCNC: 105 MMOL/L (ref 99–110)
CO2: 22 MMOL/L (ref 21–32)
CREAT SERPL-MCNC: 1.2 MG/DL (ref 0.6–1.2)
GFR AFRICAN AMERICAN: 53
GFR NON-AFRICAN AMERICAN: 43
GLUCOSE BLD-MCNC: 102 MG/DL (ref 70–99)
GLUCOSE BLD-MCNC: 106 MG/DL (ref 70–99)
GLUCOSE BLD-MCNC: 110 MG/DL (ref 70–99)
GLUCOSE BLD-MCNC: 111 MG/DL (ref 70–99)
GLUCOSE BLD-MCNC: 115 MG/DL (ref 70–99)
GLUCOSE BLD-MCNC: 84 MG/DL (ref 70–99)
GLUCOSE BLD-MCNC: 97 MG/DL (ref 70–99)
HCT VFR BLD CALC: 30.5 % (ref 36–48)
HEMOGLOBIN: 9.5 G/DL (ref 12–16)
MCH RBC QN AUTO: 27.3 PG (ref 26–34)
MCHC RBC AUTO-ENTMCNC: 31.2 G/DL (ref 31–36)
MCV RBC AUTO: 87.5 FL (ref 80–100)
PDW BLD-RTO: 14.1 % (ref 12.4–15.4)
PERFORMED ON: ABNORMAL
PERFORMED ON: NORMAL
PERFORMED ON: NORMAL
PHOSPHORUS: 2.5 MG/DL (ref 2.5–4.9)
PLATELET # BLD: 358 K/UL (ref 135–450)
PMV BLD AUTO: 7.7 FL (ref 5–10.5)
POTASSIUM SERPL-SCNC: 3.9 MMOL/L (ref 3.5–5.1)
RBC # BLD: 3.49 M/UL (ref 4–5.2)
SODIUM BLD-SCNC: 139 MMOL/L (ref 136–145)
WBC # BLD: 9.3 K/UL (ref 4–11)

## 2020-03-08 PROCEDURE — 94761 N-INVAS EAR/PLS OXIMETRY MLT: CPT

## 2020-03-08 PROCEDURE — 3609155600 HC COLONOSCOPY WITH DECOMPRESSION: Performed by: INTERNAL MEDICINE

## 2020-03-08 PROCEDURE — 2580000003 HC RX 258: Performed by: INTERNAL MEDICINE

## 2020-03-08 PROCEDURE — 6370000000 HC RX 637 (ALT 250 FOR IP): Performed by: INTERNAL MEDICINE

## 2020-03-08 PROCEDURE — 36415 COLL VENOUS BLD VENIPUNCTURE: CPT

## 2020-03-08 PROCEDURE — 99232 SBSQ HOSP IP/OBS MODERATE 35: CPT | Performed by: INTERNAL MEDICINE

## 2020-03-08 PROCEDURE — 80069 RENAL FUNCTION PANEL: CPT

## 2020-03-08 PROCEDURE — C1729 CATH, DRAINAGE: HCPCS | Performed by: INTERNAL MEDICINE

## 2020-03-08 PROCEDURE — 85027 COMPLETE CBC AUTOMATED: CPT

## 2020-03-08 PROCEDURE — 94640 AIRWAY INHALATION TREATMENT: CPT

## 2020-03-08 PROCEDURE — 1200000000 HC SEMI PRIVATE

## 2020-03-08 PROCEDURE — 0DJD8ZZ INSPECTION OF LOWER INTESTINAL TRACT, VIA NATURAL OR ARTIFICIAL OPENING ENDOSCOPIC: ICD-10-PCS | Performed by: INTERNAL MEDICINE

## 2020-03-08 PROCEDURE — 2700000000 HC OXYGEN THERAPY PER DAY

## 2020-03-08 PROCEDURE — 2709999900 HC NON-CHARGEABLE SUPPLY: Performed by: INTERNAL MEDICINE

## 2020-03-08 PROCEDURE — 94669 MECHANICAL CHEST WALL OSCILL: CPT

## 2020-03-08 RX ADMIN — LAMOTRIGINE 50 MG: 25 TABLET ORAL at 08:35

## 2020-03-08 RX ADMIN — Medication 2 PUFF: at 07:09

## 2020-03-08 RX ADMIN — HYDRALAZINE HYDROCHLORIDE 25 MG: 25 TABLET, FILM COATED ORAL at 21:04

## 2020-03-08 RX ADMIN — LAMOTRIGINE 50 MG: 25 TABLET ORAL at 21:05

## 2020-03-08 RX ADMIN — IPRATROPIUM BROMIDE AND ALBUTEROL SULFATE 3 ML: 2.5; .5 SOLUTION RESPIRATORY (INHALATION) at 15:24

## 2020-03-08 RX ADMIN — ATORVASTATIN CALCIUM 20 MG: 10 TABLET, FILM COATED ORAL at 21:04

## 2020-03-08 RX ADMIN — PROPAFENONE HYDROCHLORIDE 150 MG: 150 TABLET, FILM COATED ORAL at 21:12

## 2020-03-08 RX ADMIN — Medication 10 ML: at 13:47

## 2020-03-08 RX ADMIN — Medication 10 ML: at 21:03

## 2020-03-08 RX ADMIN — TRAMADOL HYDROCHLORIDE 100 MG: 50 TABLET, FILM COATED ORAL at 21:09

## 2020-03-08 RX ADMIN — IPRATROPIUM BROMIDE AND ALBUTEROL SULFATE 3 ML: 2.5; .5 SOLUTION RESPIRATORY (INHALATION) at 07:09

## 2020-03-08 RX ADMIN — SODIUM CHLORIDE: 9 INJECTION, SOLUTION INTRAVENOUS at 13:47

## 2020-03-08 RX ADMIN — DULOXETINE HYDROCHLORIDE 60 MG: 60 CAPSULE, DELAYED RELEASE ORAL at 08:38

## 2020-03-08 RX ADMIN — PROPAFENONE HYDROCHLORIDE 150 MG: 150 TABLET, FILM COATED ORAL at 13:47

## 2020-03-08 RX ADMIN — Medication 2 PUFF: at 20:17

## 2020-03-08 RX ADMIN — CARBIDOPA AND LEVODOPA 1 TABLET: 25; 100 TABLET ORAL at 08:35

## 2020-03-08 RX ADMIN — METOPROLOL TARTRATE 50 MG: 50 TABLET, FILM COATED ORAL at 08:35

## 2020-03-08 RX ADMIN — PROPAFENONE HYDROCHLORIDE 150 MG: 150 TABLET, FILM COATED ORAL at 06:48

## 2020-03-08 RX ADMIN — CARBIDOPA AND LEVODOPA 1 TABLET: 25; 100 TABLET ORAL at 13:47

## 2020-03-08 RX ADMIN — APIXABAN 2.5 MG: 5 TABLET, FILM COATED ORAL at 08:35

## 2020-03-08 RX ADMIN — TRAMADOL HYDROCHLORIDE 100 MG: 50 TABLET, FILM COATED ORAL at 08:35

## 2020-03-08 RX ADMIN — NIFEDIPINE 60 MG: 30 TABLET, FILM COATED, EXTENDED RELEASE ORAL at 08:35

## 2020-03-08 RX ADMIN — IPRATROPIUM BROMIDE AND ALBUTEROL SULFATE 3 ML: 2.5; .5 SOLUTION RESPIRATORY (INHALATION) at 10:57

## 2020-03-08 RX ADMIN — METOPROLOL TARTRATE 50 MG: 50 TABLET, FILM COATED ORAL at 21:06

## 2020-03-08 RX ADMIN — MONTELUKAST SODIUM 10 MG: 10 TABLET, COATED ORAL at 21:03

## 2020-03-08 RX ADMIN — APIXABAN 2.5 MG: 5 TABLET, FILM COATED ORAL at 21:05

## 2020-03-08 RX ADMIN — LINACLOTIDE 145 MCG: 145 CAPSULE, GELATIN COATED ORAL at 06:48

## 2020-03-08 RX ADMIN — CARBIDOPA AND LEVODOPA 1 TABLET: 25; 100 TABLET ORAL at 21:06

## 2020-03-08 RX ADMIN — HYDRALAZINE HYDROCHLORIDE 25 MG: 25 TABLET, FILM COATED ORAL at 08:38

## 2020-03-08 RX ADMIN — IPRATROPIUM BROMIDE AND ALBUTEROL SULFATE 3 ML: 2.5; .5 SOLUTION RESPIRATORY (INHALATION) at 20:17

## 2020-03-08 ASSESSMENT — PAIN - FUNCTIONAL ASSESSMENT: PAIN_FUNCTIONAL_ASSESSMENT: 0-10

## 2020-03-08 ASSESSMENT — PAIN DESCRIPTION - PROGRESSION

## 2020-03-08 ASSESSMENT — PAIN SCALES - GENERAL
PAINLEVEL_OUTOF10: 8
PAINLEVEL_OUTOF10: 0
PAINLEVEL_OUTOF10: 8

## 2020-03-08 ASSESSMENT — PAIN DESCRIPTION - PAIN TYPE: TYPE: ACUTE PAIN

## 2020-03-08 ASSESSMENT — PAIN DESCRIPTION - DESCRIPTORS
DESCRIPTORS: ACHING;DISCOMFORT
DESCRIPTORS: ACHING;DISCOMFORT

## 2020-03-08 ASSESSMENT — PAIN DESCRIPTION - ONSET: ONSET: ON-GOING

## 2020-03-08 ASSESSMENT — PAIN DESCRIPTION - LOCATION: LOCATION: OTHER (COMMENT)

## 2020-03-08 ASSESSMENT — PAIN DESCRIPTION - ORIENTATION: ORIENTATION: RIGHT

## 2020-03-08 ASSESSMENT — PAIN DESCRIPTION - FREQUENCY: FREQUENCY: CONTINUOUS

## 2020-03-08 NOTE — PROGRESS NOTES
Am assessment complete. Gave am meds due see mar. Pain level 8/10 R side back. PRN pain med given see mar. A/O x 4. Ice water given. Call light within reach.

## 2020-03-08 NOTE — H&P
atorvastatin (LIPITOR) 40 MG tablet Take 1 and 1/2 tablets by mouth every evening 135 tablet 3    propafenone (RYTHMOL) 150 MG tablet Take 1 tablet by mouth every 8 hours 270 tablet 3    NIFEdipine (ADALAT CC) 60 MG extended release tablet Take 1 tablet by mouth daily 90 tablet 3    furosemide (LASIX) 40 MG tablet Take 1 tablet by mouth daily 90 tablet 3    metoprolol tartrate (LOPRESSOR) 50 MG tablet Take 1 tablet by mouth 2 times daily 180 tablet 1    montelukast (SINGULAIR) 10 MG tablet Take 1 tablet by mouth daily. 90 tablet 1    budesonide (PULMICORT FLEXHALER) 180 MCG/ACT AEPB inhaler Inhale 2 puffs into the lungs 2 times daily 3 each 1    tiZANidine (ZANAFLEX) 2 MG tablet TAKE 1-2 TABLETS NIGHTLY AS NEEDED FOR JAW PAIN. 60 tablet 1    DULoxetine (CYMBALTA) 60 MG extended release capsule Take 1 capsule by mouth daily 90 capsule 1    spironolactone (ALDACTONE) 25 MG tablet Take 1 tablet by mouth daily 90 tablet 1    salmeterol (SEREVENT) 50 MCG/DOSE diskus inhaler Inhale 1 puff by mouth 2 times a day. 180 each 1    fluticasone (FLONASE) 50 MCG/ACT nasal spray USE 2 PUFFS IN EACH NOSTRIL DAILY 1 Bottle 11    lidocaine (XYLOCAINE) 5 % ointment Apply 1 Applicatorful topically as needed for Pain Apply topically as needed.       ipratropium-albuterol (DUONEB) 0.5-2.5 (3) MG/3ML SOLN nebulizer solution Inhale 3 mLs into the lungs every 6 hours as needed for Shortness of Breath DX: J45.40 360 mL 1    carbidopa-levodopa (SINEMET)  MG per tablet TAKE 1 TABLET BY MOUTH 3 TIMES A DAY 90 tablet 1    albuterol sulfate HFA (VENTOLIN HFA) 108 (90 Base) MCG/ACT inhaler Inhale 2 puffs into the lungs every 6 hours as needed for Wheezing 3 Inhaler 1    lamoTRIgine (LAMICTAL) 25 MG tablet Take 50 mg by mouth 2 times daily       Multiple Vitamins-Minerals (THERAPEUTIC MULTIVITAMIN-MINERALS) tablet Take 1 tablet by mouth daily      Alpha-Lipoic Acid 300 MG CAPS Take 300 mg by mouth daily      aspirin EC 81 MG

## 2020-03-08 NOTE — OP NOTE
Colonoscopy Note    Patient:   Rad Reynoso    YOB: 1940    Facility:   [facility] [Inpatient]  Referring/PCP: Alicia Lui MD  Procedure:   Colonoscopy with decompression tube placement  Date:     3/8/2020  Endoscopist:  Sherman Jones MD    Preoperative Diagnosis: 68-year-old female with a history of anemia, asthma, atrial fibrillation, CKD, diabetes, GERD and hypothyroidism admitted with fall. CT abd for distention showed colonic ileus. Postoperative Diagnosis:  Ileus    Anesthesia: none    Estimated blood loss: < 5 ml    Complications:  None    Description of Procedure:  Informed consent was obtained from the patient after explanation of the procedure including indications, description of the procedure,  benefits and possible risks and complications of the procedure, and alternatives. Questions were answered. The patient's history was reviewed and a directed physical examination was performed prior to the procedure. Patient was monitored throughout the procedure with pulse oximetry and periodic assessment of vital signs. Patient was sedated as noted above. The Nursing staff and I performed a time out. With the patient initially in the left lateral decubitus position, a digital rectal examination was performed and revealed negative without mass, lesions or tenderness. The Olympus video colonoscope was placed in the patient's rectum and advanced with difficulty  to the transverse colon. Photographs were not taken. The prep was inadequate. Examination of the mucosa was performed during both introduction and withdrawal of the colonoscope. Retroflexed view of the rectum was performed. Findings:     1. Distended transverse colon with intact mucosa s/p decompression tube placement  2.  Stool limited visibility     Recommendations:    - start clears  - decompression tube to gravity  - minimize narcotics  - KUB in am  - continue Linzess and

## 2020-03-08 NOTE — PROGRESS NOTES
PM assessment completed. See flow sheet. Pt A&O X 4. Resp E&E with no distress noted. PRN pain meds given per request, pt rated rt rib pain 7/10. SEE MAR. Pt.denies any needs at this time. Call light within reach. Will continue to monitor.

## 2020-03-08 NOTE — PLAN OF CARE
Problem: Falls - Risk of:  Goal: Will remain free from falls  Description: Will remain free from falls  Outcome: Ongoing  Goal: Absence of physical injury  Description: Absence of physical injury  Outcome: Ongoing     Problem: Risk for Impaired Skin Integrity  Goal: Tissue integrity - skin and mucous membranes  Description: Structural intactness and normal physiological function of skin and  mucous membranes.   Outcome: Ongoing     Problem: Pain:  Goal: Pain level will decrease  Description: Pain level will decrease  Outcome: Ongoing  Goal: Control of acute pain  Description: Control of acute pain  Outcome: Ongoing  Goal: Control of chronic pain  Description: Control of chronic pain  Outcome: Ongoing

## 2020-03-08 NOTE — PROGRESS NOTES
Progress Note    Admit Date:  2/29/2020    Subjective:  Ms. Bryon Hanson is complaining of ongoing right chest wall pain with deep breaths    Also ongoing abd distension and pain - worsening. Objective:   Patient Vitals for the past 4 hrs:   BP Temp Temp src Pulse Resp SpO2   03/08/20 1526 -- -- -- -- -- 90 %   03/08/20 1330 (!) 157/56 97.4 °F (36.3 °C) Oral 67 20 95 %   03/08/20 1240 (!) 138/44 -- -- 68 19 94 %   03/08/20 1235 (!) 136/47 -- -- 70 22 95 %            Intake/Output Summary (Last 24 hours) at 3/8/2020 1630  Last data filed at 3/8/2020 0709  Gross per 24 hour   Intake 506 ml   Output 400 ml   Net 106 ml       Physical Exam:    Gen: No distress. Alert. Morbidly obese  female, resting in bed, spouse at bedside  Head: no obvious abrasions/lacerations noted  Eyes:  No sclera icterus. No conjunctival injection. Neck: Trachea midline. Resp: No accessory muscle use. Bibasilar crackles. No wheezes. No rhonchi. On supp O2, painful w/ inspiration  CV: Regular rate. Regular rhythm. No murmur. No rub. No edema   GI: distended,mildly tender, BS - high pitched tympanic  Skin: Warm and dry. No nodule on exposed extremities. No rash on exposed extremities. Small area of ecchymosis in right posterolateral chest wall  M/S: No cyanosis. No joint deformity. No clubbing. TTP along right posterior/lateral ribs  Neuro: Awake. Grossly nonfocal    Psych: No anxiety or agitation.          Scheduled Meds:   linaclotide  145 mcg Oral QAM AC    bisacodyl  10 mg Rectal Daily    apixaban  2.5 mg Oral BID    DULoxetine  60 mg Oral Daily    atorvastatin  20 mg Oral Nightly    carbidopa-levodopa  1 tablet Oral TID    hydrALAZINE  25 mg Oral BID    lamoTRIgine  50 mg Oral BID    metoprolol tartrate  50 mg Oral BID    montelukast  10 mg Oral Nightly    NIFEdipine  60 mg Oral Daily    propafenone  150 mg Oral 3 times per day    sodium chloride flush  10 mL Intravenous 2 times per day    lidocaine  1 patch Transdermal Daily    ipratropium-albuterol  1 vial Inhalation 4x daily    fluticasone  2 puff Inhalation BID       Continuous Infusions:   dextrose         PRN Meds:  glucose, dextrose, glucagon (rDNA), dextrose, acetaminophen **OR** acetaminophen, albuterol sulfate HFA, sodium chloride flush, promethazine **OR** ondansetron, traMADol **OR** traMADol, morphine **OR** morphine      Data:  CBC:   Recent Labs     03/08/20  0640   WBC 9.3   HGB 9.5*   HCT 30.5*   MCV 87.5        BMP:   Recent Labs     03/06/20  0539 03/07/20  0603 03/08/20  0640    138 139   K 4.2 3.8 3.9    105 105   CO2 20* 22 22   PHOS  --   --  2.5   BUN 45* 28* 21*   CREATININE 1.8* 1.3* 1.2     LIVER PROFILE:   No results for input(s): AST, ALT, LIPASE, BILIDIR, BILITOT, ALKPHOS in the last 72 hours. Invalid input(s): AMYLASE,  ALB   RADIOLOGY  XR ABDOMEN (KUB) (SINGLE AP VIEW)   Final Result   Probable worsening of diffuse gaseous distention of bowel, either ileus   versus very distal obstruction, favoring the former. Continued follow-up is   advised. XR ABDOMEN (2 VIEWS)   Final Result   Distension of the colon, cecal diameter 14 mm      The findings were sent to the Radiology Results [address] at 12:53   pm on 3/6/2020to be communicated to a licensed caregiver. XR ABDOMEN (KUB) (SINGLE AP VIEW)   Final Result   Unchanged colonic ileus over the past 18 hours         XR ABDOMEN (KUB) (SINGLE AP VIEW)   Final Result   Gaseous distention of the colon without obstruction, possibly an adynamic   ileus or Inocencia's syndrome. CT ABDOMEN PELVIS WO CONTRAST Additional Contrast? None   Final Result   Marked distension of the ascending and transverse colon with mild gradual   decompression distally. Air-fluid levels are present throughout. 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    As the distal rectum/anus is outside the field of view, recommend

## 2020-03-09 ENCOUNTER — APPOINTMENT (OUTPATIENT)
Dept: GENERAL RADIOLOGY | Age: 80
DRG: 205 | End: 2020-03-09
Payer: MEDICARE

## 2020-03-09 LAB
ALBUMIN SERPL-MCNC: 2.8 G/DL (ref 3.4–5)
ANION GAP SERPL CALCULATED.3IONS-SCNC: 9 MMOL/L (ref 3–16)
BUN BLDV-MCNC: 19 MG/DL (ref 7–20)
CALCIUM SERPL-MCNC: 8.3 MG/DL (ref 8.3–10.6)
CHLORIDE BLD-SCNC: 104 MMOL/L (ref 99–110)
CO2: 21 MMOL/L (ref 21–32)
CREAT SERPL-MCNC: 1.2 MG/DL (ref 0.6–1.2)
GFR AFRICAN AMERICAN: 53
GFR NON-AFRICAN AMERICAN: 43
GLUCOSE BLD-MCNC: 92 MG/DL (ref 70–99)
PHOSPHORUS: 2.6 MG/DL (ref 2.5–4.9)
POTASSIUM SERPL-SCNC: 4.3 MMOL/L (ref 3.5–5.1)
SODIUM BLD-SCNC: 134 MMOL/L (ref 136–145)

## 2020-03-09 PROCEDURE — 94640 AIRWAY INHALATION TREATMENT: CPT

## 2020-03-09 PROCEDURE — 2700000000 HC OXYGEN THERAPY PER DAY

## 2020-03-09 PROCEDURE — 94150 VITAL CAPACITY TEST: CPT

## 2020-03-09 PROCEDURE — 2580000003 HC RX 258: Performed by: INTERNAL MEDICINE

## 2020-03-09 PROCEDURE — 99232 SBSQ HOSP IP/OBS MODERATE 35: CPT | Performed by: INTERNAL MEDICINE

## 2020-03-09 PROCEDURE — 80069 RENAL FUNCTION PANEL: CPT

## 2020-03-09 PROCEDURE — 94761 N-INVAS EAR/PLS OXIMETRY MLT: CPT

## 2020-03-09 PROCEDURE — 6370000000 HC RX 637 (ALT 250 FOR IP): Performed by: INTERNAL MEDICINE

## 2020-03-09 PROCEDURE — 97530 THERAPEUTIC ACTIVITIES: CPT

## 2020-03-09 PROCEDURE — 94669 MECHANICAL CHEST WALL OSCILL: CPT

## 2020-03-09 PROCEDURE — 0D9670Z DRAINAGE OF STOMACH WITH DRAINAGE DEVICE, VIA NATURAL OR ARTIFICIAL OPENING: ICD-10-PCS | Performed by: INTERNAL MEDICINE

## 2020-03-09 PROCEDURE — 1200000000 HC SEMI PRIVATE

## 2020-03-09 PROCEDURE — 36415 COLL VENOUS BLD VENIPUNCTURE: CPT

## 2020-03-09 PROCEDURE — 74018 RADEX ABDOMEN 1 VIEW: CPT

## 2020-03-09 PROCEDURE — 97535 SELF CARE MNGMENT TRAINING: CPT

## 2020-03-09 RX ADMIN — Medication 2 PUFF: at 20:24

## 2020-03-09 RX ADMIN — LAMOTRIGINE 50 MG: 25 TABLET ORAL at 09:45

## 2020-03-09 RX ADMIN — MONTELUKAST SODIUM 10 MG: 10 TABLET, COATED ORAL at 22:50

## 2020-03-09 RX ADMIN — IPRATROPIUM BROMIDE AND ALBUTEROL SULFATE 3 ML: 2.5; .5 SOLUTION RESPIRATORY (INHALATION) at 20:24

## 2020-03-09 RX ADMIN — METOPROLOL TARTRATE 50 MG: 50 TABLET, FILM COATED ORAL at 22:49

## 2020-03-09 RX ADMIN — IPRATROPIUM BROMIDE AND ALBUTEROL SULFATE 3 ML: 2.5; .5 SOLUTION RESPIRATORY (INHALATION) at 07:43

## 2020-03-09 RX ADMIN — LINACLOTIDE 145 MCG: 145 CAPSULE, GELATIN COATED ORAL at 06:40

## 2020-03-09 RX ADMIN — HYDRALAZINE HYDROCHLORIDE 25 MG: 25 TABLET, FILM COATED ORAL at 09:45

## 2020-03-09 RX ADMIN — LAMOTRIGINE 50 MG: 25 TABLET ORAL at 22:50

## 2020-03-09 RX ADMIN — IPRATROPIUM BROMIDE AND ALBUTEROL SULFATE 3 ML: 2.5; .5 SOLUTION RESPIRATORY (INHALATION) at 16:05

## 2020-03-09 RX ADMIN — TRAMADOL HYDROCHLORIDE 100 MG: 50 TABLET, FILM COATED ORAL at 17:42

## 2020-03-09 RX ADMIN — PROPAFENONE HYDROCHLORIDE 150 MG: 150 TABLET, FILM COATED ORAL at 17:42

## 2020-03-09 RX ADMIN — PROPAFENONE HYDROCHLORIDE 150 MG: 150 TABLET, FILM COATED ORAL at 22:51

## 2020-03-09 RX ADMIN — CARBIDOPA AND LEVODOPA 1 TABLET: 25; 100 TABLET ORAL at 09:45

## 2020-03-09 RX ADMIN — PROPAFENONE HYDROCHLORIDE 150 MG: 150 TABLET, FILM COATED ORAL at 06:40

## 2020-03-09 RX ADMIN — APIXABAN 2.5 MG: 5 TABLET, FILM COATED ORAL at 09:45

## 2020-03-09 RX ADMIN — Medication 2 PUFF: at 07:46

## 2020-03-09 RX ADMIN — CARBIDOPA AND LEVODOPA 1 TABLET: 25; 100 TABLET ORAL at 22:50

## 2020-03-09 RX ADMIN — TRAMADOL HYDROCHLORIDE 100 MG: 50 TABLET, FILM COATED ORAL at 09:45

## 2020-03-09 RX ADMIN — Medication 10 ML: at 22:51

## 2020-03-09 RX ADMIN — NIFEDIPINE 60 MG: 30 TABLET, FILM COATED, EXTENDED RELEASE ORAL at 09:45

## 2020-03-09 RX ADMIN — DULOXETINE HYDROCHLORIDE 60 MG: 60 CAPSULE, DELAYED RELEASE ORAL at 09:46

## 2020-03-09 RX ADMIN — HYDRALAZINE HYDROCHLORIDE 25 MG: 25 TABLET, FILM COATED ORAL at 22:50

## 2020-03-09 RX ADMIN — APIXABAN 2.5 MG: 5 TABLET, FILM COATED ORAL at 22:50

## 2020-03-09 RX ADMIN — CARBIDOPA AND LEVODOPA 1 TABLET: 25; 100 TABLET ORAL at 17:42

## 2020-03-09 RX ADMIN — METOPROLOL TARTRATE 50 MG: 50 TABLET, FILM COATED ORAL at 09:46

## 2020-03-09 RX ADMIN — ATORVASTATIN CALCIUM 20 MG: 10 TABLET, FILM COATED ORAL at 22:50

## 2020-03-09 RX ADMIN — Medication 10 ML: at 09:47

## 2020-03-09 ASSESSMENT — PAIN DESCRIPTION - PROGRESSION
CLINICAL_PROGRESSION: NOT CHANGED

## 2020-03-09 ASSESSMENT — PAIN SCALES - GENERAL
PAINLEVEL_OUTOF10: 7
PAINLEVEL_OUTOF10: 7

## 2020-03-09 NOTE — PROGRESS NOTES
Training gait deferred due to difficulty with transfers; pt ambulated 0 ft. Stair Training deferred, pt unsafe/not appropriate to complete stairs at this time      Therapeutic Exercise Veena deferred for focus on functional mobility (disregard section)  Ankle pumps:  Quad sets:   Glut sets:   Heel slides:   SLR:   Hip abd/add:   LAQ:   Marching:     Balance  Static Sitting:  Good -    Tolerance:   Dynamic Sitting:  Fair +  Static Standing: Fair -   Tolerance: 90 sec + 20 sec at SW  Dynamic Standing: Poor    Patient Education      Role of PT, POC, Discharge recommendations, DC recommendations, safety awareness, transfer techniques and calling for assist with mobility. Positioning Needs       Pt instructed and educated on use of call light to call for assist if desiring to get up or change position, call light handed to pt and needs within reach, Pt reclined in chair, alarm set, call light provided and all needs within reach and pillows placed for support/comfort. Activity Tolerance   Pt completed therapy session with Pain noted with all mobility. SpO2:   HR:  BP:     Other  Pericare performed in standing at chair. Assessment :  Patient continues to demo significantly decreased activity tolerance and functional mobility compared to baseline. Pt stood for 90 seconds today to SW but had to sit down due to feeling like her knees may buckle. Patient will continue to benefit from skilled physical therapy to address functional mobility deficits and improve independence for recommended discharge to SNF. Goals (all goals ongoing unless otherwise indicated)  To be met in 3 visits:  1).  Independent with LE Ex x 10 reps - Ongoing     To be met in 6 visits:  1).  Supine to/from sit: Min A  - Ongoing  2).  Sit to/from stand: Min A - Ongoing  3).  Bed to chair: Min A - Ongoing  4).  Gait: Ambulate 50 ft.  with  Min A  and use of rolling walker (RW) - Ongoing  5).  Tolerate B LE exercises 3 sets of 10-15 reps

## 2020-03-09 NOTE — CARE COORDINATION
INTERDISCIPLINARY PLAN OF CARE CONFERENCE    Date/Time: 3/9/2020 11:32 AM  Completed by: Danilo Gloria Case Management      Patient Name:  Antoinette Holter  YOB: 1940  Admitting Diagnosis: Intractable pain [R52]  Intractable pain [R52]     Admit Date/Time:  2/29/2020  4:03 PM    Chart reviewed. Interdisciplinary team met to discuss patient progress and discharge plans. Disciplines included Case Management, Nursing, and Dietitian. Current Status: IP 03/01/2019 1130    PT/OT recommendation: ARU    Anticipated Discharge Date:  TBD  Expected D/C Disposition: Acute Rehab  Confirmed plan with patient :Yes  Discharge Plan Comments: Chart reviewed. Met with pt at bedside and explained the role of the CM. Plan: Rehab at [address] [place] [address]. +Rectal tube inserted for decompression. Ongoing ileus. Notification call to Ramone Lainez to confirm bed status and pre-cert. +CM following.      Home O2 in place on admit: No  Pt informed of need to bring portable home O2 tank on day of discharge for nursing to connect prior to leaving:  No  Verbalized agreement/Understanding:  No

## 2020-03-09 NOTE — PROGRESS NOTES
Occupational Therapy Daily Treatment Note    Unit: [facility]  Date:  3/9/2020  Patient Name:    Patric Multani  Admitting diagnosis:  Intractable pain [R52]  Intractable pain [R52]  Admit Date:  2/29/2020  Precautions/Restrictions:  fall risk, IV, bed/chair alarm, supplemental O2 (3L) and purewick catheter      Discharge Recommendations: SNF  DME needs for discharge: defer to facility       Therapy recommendations for staff:   Assist of 2 (moderate assist) with use of STEDY for all transfers to/from BSC/chair    AM-PAC Score: [address] S4 Level: NA       Treatment Time:  11:28-12:08  Treatment number:  2    Total Treatment Time:   40 minutes      Subjective:  Pt agreeable to tx    Pain   Yes  Rating: severe  Location:R ribcage   Pain Medicine Status: No request made      Bed Mobility:   Supine to Sit:  Mod A of 2  Sit to Supine:  N/A  Rolling:           N/A  Scooting: Mod A of 2    Transfer Training:   Sit to stand: Mod A of 2  Stand to sit:  Mod A of 2  Bed to Chair:  Mod A of 2 and with use of RW  Bed to [facility]:   Mod A of 2 and with use of RW  Standard toilet:   N/A   Pt worked on transfers this day with SW (RW was not available). Pt completed transfer from bed to chair with SW and Mod A x 2. Then pt had a BM and needed to be cleaned, stood at walker for 90 secs while therapist cleaned pt. Third stand was 20 secs to pull up depends, pt was clearly fatigued by this time. Activity Tolerance   Pt completed therapy session with Pain noted with activity     ADL Training:   Upper body dressing:  Min A  Upper body bathing:  N/A  Lower body dressing: Total A   Lower body bathing:  Max A  Toileting: Total A   Grooming/Hygiene:  N/A    Therapeutic Exercise:   N/A    Patient Education:   Role of OT  Energy conservation techniques  Safe RW use/hand placement    Positioning Needs:   Up in chair, call light and needs in reach.       Family Present:  Yes    Assessment: Pt continues to benefit from OT tx to work toward Ind with ADL's, IADL's, balance, endurance, safety and overall strength. GOALS  1). Bed to [address] [address] and with use of RW     To be met in 5 Visits:  1). Supine to Sit: Supervision  2). Upper Body Bathing:  Min A  3). Lower Body Bathing:  Min A with appropriate AE  4). Upper Body Dressing: Min A  5). Lower Body Dressing: Min A with appropriate AE   6).  Pt to kimo UE exs x 15 reps      Plan: cont with POC      Bhavesh Brown OTR/L #[id number]        If patient discharges from this facility prior to next visit, this note will serve as the Discharge Summary

## 2020-03-09 NOTE — PROGRESS NOTES
Progress Note    Admit Date:  2/29/2020    Subjective:  Ms. Nicolas Sidney states the pain in her right chest has not improved. She had a rectal decompression tube placed yesterday with GI and states her abdominal distention is much better. She is tolerating clear liquids    Objective:   Patient Vitals for the past 4 hrs:   BP Temp Temp src Pulse Resp SpO2   03/09/20 0800 (!) 149/67 98 °F (36.7 °C) Oral 74 18 96 %   03/09/20 0746 -- -- -- -- 18 92 %            Intake/Output Summary (Last 24 hours) at 3/9/2020 1127  Last data filed at 3/9/2020 0644  Gross per 24 hour   Intake 900 ml   Output 400 ml   Net 500 ml       Physical Exam:    Gen: No distress. Alert. Morbidly obese  female, resting in bed, spouse at bedside  Head: no obvious abrasions/lacerations noted  Eyes:  No sclera icterus. No conjunctival injection. Scarring of the left eye noted  Neck: Trachea midline. Resp: No accessory muscle use. Bibasilar crackles. No wheezes. No rhonchi. On supp O2, painful w/ inspiration  CV: Regular rate. Regular rhythm. No murmur. No rub. No edema   GI: distended, not tender, BS present  Skin: Warm and dry. No nodule on exposed extremities. No rash on exposed extremities. Small area of ecchymosis in right posterolateral chest wall  M/S: No cyanosis. No joint deformity. No clubbing. TTP along right posterior/lateral ribs  Neuro: Awake. Grossly nonfocal    Psych: No anxiety or agitation. RANDELL Castellanos have reviewed the chart on [facility] and personally interviewed and examined patient, reviewed the data (labs and imaging) and after discussion with my PA formulated the plan. Agree with note with the following edits. HPI:     I reviewed the patient's Past Medical History, Past Surgical History, Medications, and Allergies. Feels better today   Had rectal decompression tube placed        General:elderly female  Awake, alert and oriented.  Appears to be not in any distress  Mucous Membranes:  Pink , Final Result   No acute process. Assessment/Plan:    #Mechanical Fall  -Patient with Parkinson's disease who typically ambulates with a walker, had mechanical fall while walking without her walker using her 's assistance.  - PT/OT - recommending ARU    #Parkinson's Disease  - cont Sinemet at home dose    #Chest wall pain 2/2 to lung contusion  #Right rib contusion  #Pleural Effusion - Right  - poss atelectasis vs contusion? No hemoptysis. Seen by pulmonary. Appreciate recommendations. She should have a repeat chest CT in 10 to 12 weeks to document resolution of findings  -Her Eliquis was held on admission but has been resumed  -Pain control  -Encourage incentive spirometry, she admits that she is not been doing this      #Hypoxia  - suspect 2/2 above. 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 -CT a/p as above-  Distension of ascending and transverse colon  -GI consulted.   Status post rectal decompression tube placement on 3/8/2020  - linzess added   -Tolerating clear liquids    #Hyperkalemia  - 5.3-> 4.8  - held aldactone  - daily BMP     #Mild FARHEEN on CKD stage III  - baseline Cr variable~  1.9 on 1/24/2020  - Cr on admission 2.1->2.3->2.1-> 1.2 now   - hold Lasix and aldactone for now     #Leukocytosis  - 15.3->11.7, appears chronically elevated  - no apparent infection     #PAF  - regular rate and rhythm on exam  - cont BB, Rythmol  - AC held 2/2 fall, CXR with effusion, poss contusion? - now restarted eliquis     #Chronic Diastolic CHF  - appears compensated  - low Na diet, I&Os, daily weights  - cont BB, holding Lasix and  Aldactone for now    #Type II DM  - previous hx, not on diabetic meds  - last Hgb A1c: 6.4 on 1/24/20  - controlled with diet, carb control    #HTN  - stable  - cont Hydralazine, Lopressor, Nifedipine  -  lasix held for now & HCTZ    #HLD  - cont Lipitor     #COPD  - no AE  - cont HHN    #Chronic Normocytic Anemia  - Hgb 10.3  - stable, monitor      #Seizure DO  - seizure precautions  - cont Lamictal    #Morbid Obesity  - Body mass index is 41.8 kg/m². - Complicating assessment and treatment. Placing patient at risk for multiple co-morbidities as well as early death and contributing to the patient's presentation.   - Counseled on weight loss.      #BETSY  - BiPAP QHS    DVT Prophylaxis: Eliquis  Diet: DIET CLEAR LIQUID; Low Sodium (2 GM);  Daily Fluid Restriction: 1500 ml  Code Status: Full Code    Dago Benoit PA-C  3/9/2020 11:32 AM      Agree with above  Changes made to note    Colleen Oconnor MD 3/9/2020 11:38 AM

## 2020-03-09 NOTE — PROGRESS NOTES
Nutrition Assessment    Type and Reason for Visit: Initial(LOS x 7)    Nutrition Recommendations:   1. Continue Clear liquid diet + 1500 mL FR until medically able to advance diet. 2. Start Ensure Clear ONS, alternating between Apple and Matos (per pt), 3 times daily with meals. 3. Monitor PO intake of meals and supplements. 4. Monitor weight trends, diarrhea + bowel function, and nutrition related lab values. Nutrition Assessment: pt was nutritionally adequate upon admission AEB good PO intake/appetite and stable weight (weight gain actually) PTA; however patient is declining from a nutritional standpoint AEB intermittent NPO status + clear liquid diet x 5 days d/t Ileus, endocrine dysfunction, cardiac dysfunction and renal dysfunction. Will continue clear liquid diet + start Ensure clear x 3 meals per day until medically able to advance diet. Malnutrition Assessment:  · Malnutrition Status: At risk for malnutrition  · Context: Acute illness or injury  · Findings of the 6 clinical characteristics of malnutrition (Minimum of 2 out of 6 clinical characteristics is required to make the diagnosis of moderate or severe Protein Calorie Malnutrition based on AND/ASPEN Guidelines):  1. Energy Intake-Less than or equal to 50% of estimated energy requirement, Greater than or equal to 5 days    2. Weight Loss-No significant weight loss, in 6 months  3. Fat Loss-No significant subcutaneous fat loss,    4. Muscle Loss-No significant muscle mass loss,    5. Fluid Accumulation-Mild fluid accumulation(non-pitting on BLE (unknown rating)), Extremities  6.   Strength-Not measured    Nutrition Risk Level: High    Nutrient Needs:  · Estimated Daily Total Kcal: 8093-5281 kcals based on 10-12 kcals/kg/CBW  · Estimated Daily Protein (g): 130-142 g protein based on 2.2-2.4 g/kg/CBW  · Estimated Daily Total Fluid (ml/day): 1500 mL(per MD)    Nutrition Diagnosis:   · Problem: Inadequate oral intake  · Etiology: related to Alteration in GI function, Insufficient energy/nutrient consumption     Signs and symptoms:  as evidenced by Diet history of poor intake, GI abnormality, Lab values    Objective Information:  · Nutrition-Focused Physical Findings: pt was lying in bed with huband in room when visit was initiated; pt has good dentition; pt is blind in the left eye; pt has parkinsons and is considered a fall risk; pt c/o diarrhea yesterday that seems to have resolved this AM; pt denies any N/V; pt's last meal with solid foods was on 3/2/20; pt ambulates with a walker + help of spouse; pt fell PTA and has bruised ribs that cause her pain + skin tear on left hand + scattered bruising on BUE; pt -259 + pt is weighed daily at home; pt relies on  to cook and shop for her; he is her main caregiver; pt was following a carb controlled, fluid and sodium restricted diet PTA; pt and spouse have been  for 56 years; pts usual meal intake consists of 2-3 meals/day + snacks; pt and spouse deny any financial strain that would limit their access to foods.   · Wound Type: Pressure Ulcer, Stage II(R buttocks)  · Current Nutrition Therapies:  · Oral Diet Orders: Fluid Restriction, Clear Liquid(1500 mL)   · Oral Diet intake: %  · Oral Nutrition Supplement (ONS) Orders: None  · ONS intake: (none)  · Anthropometric Measures:  · Ht: 5' 6\" (167.6 cm)   · Current Body Wt: 282 lb 4 oz (128 kg)(Actual; bed scale)  · Admission Body Wt: 259 lb (117.5 kg)(Stated)  · Usual Body Wt: (258-259# per pt (weighed daily at home))  · Weight Change:+ 32# or + 12.8% x 6 months  · Ideal Body Wt: 130 lb (59 kg), % Ideal Body 217%  · BMI Classification: BMI > or equal to 40.0 Obese Class III(45.7 BMI)    Nutrition Interventions:   Continue current diet, Start ONS  Continued Inpatient Monitoring, Coordination of Care, Coordination of Community Care    Nutrition Evaluation:   · Evaluation: Goals set   · Goals: pt will attempt to consume >/= 50% of meals x 3 meals + consume >/= 75% of Ensure Clear x 3 meals/day until medically able to advance diet.     · Monitoring: Nutrition Progression, Meal Intake, Supplement Intake, Diet Tolerance, Skin Integrity, Weight, Pertinent Labs, Diarrhea, Monitor Bowel Function      Electronically signed by Yola Siegel on 3/9/20 at 1:41 PM EDT    Contact Number: [phone]

## 2020-03-10 ENCOUNTER — APPOINTMENT (OUTPATIENT)
Dept: GENERAL RADIOLOGY | Age: 80
DRG: 205 | End: 2020-03-10
Payer: MEDICARE

## 2020-03-10 ENCOUNTER — TELEPHONE (OUTPATIENT)
Dept: PULMONOLOGY | Age: 80
End: 2020-03-10

## 2020-03-10 LAB
ALBUMIN SERPL-MCNC: 2.8 G/DL (ref 3.4–5)
ANION GAP SERPL CALCULATED.3IONS-SCNC: 12 MMOL/L (ref 3–16)
BASOPHILS ABSOLUTE: 0.1 K/UL (ref 0–0.2)
BASOPHILS RELATIVE PERCENT: 0.8 %
BUN BLDV-MCNC: 16 MG/DL (ref 7–20)
CALCIUM SERPL-MCNC: 8.2 MG/DL (ref 8.3–10.6)
CHLORIDE BLD-SCNC: 102 MMOL/L (ref 99–110)
CO2: 23 MMOL/L (ref 21–32)
CREAT SERPL-MCNC: 1.1 MG/DL (ref 0.6–1.2)
EOSINOPHILS ABSOLUTE: 0.2 K/UL (ref 0–0.6)
EOSINOPHILS RELATIVE PERCENT: 1.6 %
GFR AFRICAN AMERICAN: 58
GFR NON-AFRICAN AMERICAN: 48
GLUCOSE BLD-MCNC: 93 MG/DL (ref 70–99)
HCT VFR BLD CALC: 28.9 % (ref 36–48)
HEMOGLOBIN: 9.1 G/DL (ref 12–16)
LYMPHOCYTES ABSOLUTE: 1.2 K/UL (ref 1–5.1)
LYMPHOCYTES RELATIVE PERCENT: 9.8 %
MCH RBC QN AUTO: 27.4 PG (ref 26–34)
MCHC RBC AUTO-ENTMCNC: 31.5 G/DL (ref 31–36)
MCV RBC AUTO: 86.9 FL (ref 80–100)
MONOCYTES ABSOLUTE: 1 K/UL (ref 0–1.3)
MONOCYTES RELATIVE PERCENT: 8.2 %
NEUTROPHILS ABSOLUTE: 9.9 K/UL (ref 1.7–7.7)
NEUTROPHILS RELATIVE PERCENT: 79.6 %
PDW BLD-RTO: 14.2 % (ref 12.4–15.4)
PHOSPHORUS: 2.7 MG/DL (ref 2.5–4.9)
PLATELET # BLD: 328 K/UL (ref 135–450)
PMV BLD AUTO: 8.1 FL (ref 5–10.5)
POTASSIUM SERPL-SCNC: 4.2 MMOL/L (ref 3.5–5.1)
RBC # BLD: 3.33 M/UL (ref 4–5.2)
SODIUM BLD-SCNC: 137 MMOL/L (ref 136–145)
WBC # BLD: 12.4 K/UL (ref 4–11)

## 2020-03-10 PROCEDURE — 2580000003 HC RX 258: Performed by: INTERNAL MEDICINE

## 2020-03-10 PROCEDURE — 36415 COLL VENOUS BLD VENIPUNCTURE: CPT

## 2020-03-10 PROCEDURE — 6370000000 HC RX 637 (ALT 250 FOR IP): Performed by: INTERNAL MEDICINE

## 2020-03-10 PROCEDURE — 99232 SBSQ HOSP IP/OBS MODERATE 35: CPT | Performed by: INTERNAL MEDICINE

## 2020-03-10 PROCEDURE — 94640 AIRWAY INHALATION TREATMENT: CPT

## 2020-03-10 PROCEDURE — 94669 MECHANICAL CHEST WALL OSCILL: CPT

## 2020-03-10 PROCEDURE — 2700000000 HC OXYGEN THERAPY PER DAY

## 2020-03-10 PROCEDURE — 74018 RADEX ABDOMEN 1 VIEW: CPT

## 2020-03-10 PROCEDURE — 1200000000 HC SEMI PRIVATE

## 2020-03-10 PROCEDURE — 85025 COMPLETE CBC W/AUTO DIFF WBC: CPT

## 2020-03-10 PROCEDURE — 97535 SELF CARE MNGMENT TRAINING: CPT

## 2020-03-10 PROCEDURE — 97530 THERAPEUTIC ACTIVITIES: CPT

## 2020-03-10 PROCEDURE — 80069 RENAL FUNCTION PANEL: CPT

## 2020-03-10 PROCEDURE — 94761 N-INVAS EAR/PLS OXIMETRY MLT: CPT

## 2020-03-10 RX ADMIN — IPRATROPIUM BROMIDE AND ALBUTEROL SULFATE 3 ML: 2.5; .5 SOLUTION RESPIRATORY (INHALATION) at 15:35

## 2020-03-10 RX ADMIN — HYDRALAZINE HYDROCHLORIDE 25 MG: 25 TABLET, FILM COATED ORAL at 21:56

## 2020-03-10 RX ADMIN — LINACLOTIDE 145 MCG: 145 CAPSULE, GELATIN COATED ORAL at 06:05

## 2020-03-10 RX ADMIN — APIXABAN 2.5 MG: 5 TABLET, FILM COATED ORAL at 11:00

## 2020-03-10 RX ADMIN — ACETAMINOPHEN 650 MG: 325 TABLET ORAL at 06:05

## 2020-03-10 RX ADMIN — CARBIDOPA AND LEVODOPA 1 TABLET: 25; 100 TABLET ORAL at 21:57

## 2020-03-10 RX ADMIN — NIFEDIPINE 60 MG: 30 TABLET, FILM COATED, EXTENDED RELEASE ORAL at 10:58

## 2020-03-10 RX ADMIN — APIXABAN 2.5 MG: 5 TABLET, FILM COATED ORAL at 21:57

## 2020-03-10 RX ADMIN — PROPAFENONE HYDROCHLORIDE 150 MG: 150 TABLET, FILM COATED ORAL at 14:44

## 2020-03-10 RX ADMIN — MONTELUKAST SODIUM 10 MG: 10 TABLET, COATED ORAL at 21:56

## 2020-03-10 RX ADMIN — LAMOTRIGINE 50 MG: 25 TABLET ORAL at 21:57

## 2020-03-10 RX ADMIN — PROPAFENONE HYDROCHLORIDE 150 MG: 150 TABLET, FILM COATED ORAL at 22:04

## 2020-03-10 RX ADMIN — ATORVASTATIN CALCIUM 20 MG: 10 TABLET, FILM COATED ORAL at 21:57

## 2020-03-10 RX ADMIN — LAMOTRIGINE 50 MG: 25 TABLET ORAL at 10:59

## 2020-03-10 RX ADMIN — TRAMADOL HYDROCHLORIDE 100 MG: 50 TABLET, FILM COATED ORAL at 03:29

## 2020-03-10 RX ADMIN — METOPROLOL TARTRATE 50 MG: 50 TABLET, FILM COATED ORAL at 21:56

## 2020-03-10 RX ADMIN — HYDRALAZINE HYDROCHLORIDE 25 MG: 25 TABLET, FILM COATED ORAL at 10:59

## 2020-03-10 RX ADMIN — CARBIDOPA AND LEVODOPA 1 TABLET: 25; 100 TABLET ORAL at 10:59

## 2020-03-10 RX ADMIN — DULOXETINE HYDROCHLORIDE 60 MG: 60 CAPSULE, DELAYED RELEASE ORAL at 10:58

## 2020-03-10 RX ADMIN — PROPAFENONE HYDROCHLORIDE 150 MG: 150 TABLET, FILM COATED ORAL at 06:05

## 2020-03-10 RX ADMIN — TRAMADOL HYDROCHLORIDE 50 MG: 50 TABLET, FILM COATED ORAL at 22:04

## 2020-03-10 RX ADMIN — CARBIDOPA AND LEVODOPA 1 TABLET: 25; 100 TABLET ORAL at 14:44

## 2020-03-10 RX ADMIN — IPRATROPIUM BROMIDE AND ALBUTEROL SULFATE 3 ML: 2.5; .5 SOLUTION RESPIRATORY (INHALATION) at 07:22

## 2020-03-10 RX ADMIN — METOPROLOL TARTRATE 50 MG: 50 TABLET, FILM COATED ORAL at 10:58

## 2020-03-10 RX ADMIN — IPRATROPIUM BROMIDE AND ALBUTEROL SULFATE 3 ML: 2.5; .5 SOLUTION RESPIRATORY (INHALATION) at 10:39

## 2020-03-10 RX ADMIN — Medication 2 PUFF: at 19:58

## 2020-03-10 RX ADMIN — Medication 2 PUFF: at 07:22

## 2020-03-10 RX ADMIN — Medication 10 ML: at 11:00

## 2020-03-10 RX ADMIN — IPRATROPIUM BROMIDE AND ALBUTEROL SULFATE 3 ML: 2.5; .5 SOLUTION RESPIRATORY (INHALATION) at 19:57

## 2020-03-10 ASSESSMENT — PAIN SCALES - GENERAL
PAINLEVEL_OUTOF10: 7
PAINLEVEL_OUTOF10: 9
PAINLEVEL_OUTOF10: 0
PAINLEVEL_OUTOF10: 3

## 2020-03-10 ASSESSMENT — PAIN DESCRIPTION - LOCATION: LOCATION: RIB CAGE

## 2020-03-10 ASSESSMENT — PAIN DESCRIPTION - PAIN TYPE: TYPE: ACUTE PAIN

## 2020-03-10 NOTE — PROGRESS NOTES
max functional IND     GOALS  1). Bed to [facility] 13 and with use of RW     To be met in 5 Visits:  1). Supine to Sit: Supervision  2). Upper Body Bathing:  Min A  3). Lower Body Bathing:  Min A with appropriate AE  4). Upper Body Dressing: Min A  5). Lower Body Dressing: Min A with appropriate AE   6).  Pt to kimo UE exs x 15 reps    Plan: cont with POC      MERLY Pino/L  Lic # [id number]        If patient discharges from this facility prior to next visit, this note will serve as the Discharge Summary

## 2020-03-10 NOTE — PROGRESS NOTES
Shift assessment completed. See flow sheet. Respiration easy, even and non labored. encouraging pt to cough as pt is attempting to suppress cough. Vital signs WNL. Pt alert and oriented x 4. Side rails up x 2. Pt without IV access, pt states staff attempted earlier today but were unsuccessful. Asked pt if this writer could attempt access, pt declined at this time. Pt states she feels her abdomen is less distended than it was previous day. Pt denies any needs at this time. Call light within reach. Will continue to monitor.

## 2020-03-10 NOTE — PLAN OF CARE
Problem: ACTIVITY INTOLERANCE/IMPAIRED MOBILITY  Goal: Mobility/activity is maintained at optimum level for patient  Outcome: Ongoing  Note: Pt will work on moving and getting out of bed this shift

## 2020-03-10 NOTE — TELEPHONE ENCOUNTER
MD Ynes Brown MA             Contact after discharge: CT chest in 10 to 12 weeks follow-up abnormal CT chest

## 2020-03-10 NOTE — PLAN OF CARE
Problem: ACTIVITY INTOLERANCE/IMPAIRED MOBILITY  Goal: Mobility/activity is maintained at optimum level for patient  3/10/2020 8305 by Ace Mehta RN  Outcome: Ongoing  Note: Pt will work on moving and getting out of bed this shift

## 2020-03-10 NOTE — PROGRESS NOTES
Inpatient Physical Therapy Daily Treatment Note    Unit: [facility]  Date:  3/10/2020  Patient Name:    Lisa Koehler  Admitting diagnosis:  Intractable pain [R52]  Intractable pain [R52]  Admit Date:  2/29/2020  Precautions/Restrictions:  fall risk, bed/chair alarm, supplemental O2 (3L) and purewick catheter      Discharge Recommendations: SNF  DME needs for discharge: defer to facility       Therapy recommendation for EMS Transport: requires transport by cot due to difficulty with transfers    Therapy recommendations for staff:   Assist of 2 with use of STEDY for all transfers to/from BSC/chair    History of Present Illness: Pt with  Hx of PD was attempting to transfer to her car in her garage when she states her legs gave out and she fell, hitting her side on the running board of her car.  Severe pain.  Pt was unable to ambulate following the fall 2/2 pain & weakness. Work up in the ED showed a small effusion on the right poss related to atelectasis. CT cervical spine and head were negative. PMH: Parkinson's Disease, Asthma, PAF, CKD stage III, DM controlled with diet, HTN, HLD     Home Health S4 Level Recommendation: NA  AM-PAC Mobility Score   AM-PAC Inpatient Mobility Raw Score : 10       Treatment Time:  8553-8576  Treatment number: 7  Timed Code Treatment Minutes: 39 minutes  Total Treatment Minutes:  39  minutes    Cognition    A&O x4   Able to follow 2 step commands    Subjective  Patient lying supine in bed with no family present  Pt agreeable to this PT tx. Pain   Yes  Location: R ribs  Rating:  10/10 during mobility, 1/10 at rest  Pain Medicine Status: RN notified and No request made     Bed Mobility   Supine to Sit:   Mod A of 2  Sit to Supine:  Not Tested  Rolling:   Not Tested  Scooting:   CGA with increased time    Transfer Training     Sit to stand: Mod A of 2 to SW x 3 (from EOB/chair). Pt req VCs for \"nose over toes\" to bring weight forward over NENA.   Stand to sit:   Mod A of 2 from SW x 3 (to EOB/chair). Pt req VCs to get hips backward into chair. Bed to Chair:  Mod A of 2 with use of standard walker (SW), assistance needed for walker management. Stand-step transfer. Gait Training gait deferred due to difficulty with transfers; pt ambulated 0 ft. Stair Training deferred, pt unsafe/not appropriate to complete stairs at this time      Therapeutic Exercise Veena deferred for focus on functional mobility (disregard section)  Ankle pumps:  Quad sets:   Glut sets:   Heel slides:   SLR:   Hip abd/add:   LAQ:   Marching:     Balance  Static Sitting:  Good -    Tolerance:   Dynamic Sitting:  Fair +  Static Standing: Fair -   Tolerance: 2 minutes + 30 sec at SW. Pt's bilateral knees begin to buckle near 2 minute jenny. Dynamic Standing: Poor    Patient Education      Role of PT, POC, Discharge recommendations, DC recommendations, safety awareness, transfer techniques and calling for assist with mobility. Positioning Needs       Pt instructed and educated on use of call light to call for assist if desiring to get up or change position, call light handed to pt and needs within reach, Pt reclined in chair, alarm set, call light provided and all needs within reach and pillows placed for support/comfort. Activity Tolerance   Pt completed therapy session with Pain noted with all mobility. SpO2:   HR:  BP:     Other  Pericare performed in standing at chair. Assessment :  Patient continues to demo significantly decreased activity tolerance and functional mobility compared to baseline. Pt stood for 2 minutes today to SW but had to sit down due to bilateral knee buckling. Pt continues to require 2 person assist but overall improved since yesterday's session. Pt educated on \"huffing\" technique for airway clearance as well as bracing with pillow for comfort and demos good understanding.  Patient will continue to benefit from skilled physical therapy to address functional mobility deficits and improve

## 2020-03-10 NOTE — PROGRESS NOTES
Progress Note    Admit Date:  2/29/2020      Subjective:  Ms. Maria Del Carmen Hannah complaining of distended abdomen today. She had a rectal tube placed 3/8 with improvement, but today states it is getting worse. Rectal tube remains in place     Objective:   Patient Vitals for the past 4 hrs:   BP Temp Temp src Pulse Resp SpO2   03/10/20 0754 118/66 97.3 °F (36.3 °C) Oral 99 18 94 %   03/10/20 0724 -- -- -- -- -- 93 %            Intake/Output Summary (Last 24 hours) at 3/10/2020 1056  Last data filed at 3/10/2020 0008  Gross per 24 hour   Intake 220 ml   Output 1900 ml   Net -1680 ml       Physical Exam:    Gen: No distress. Alert. Morbidly obese  female, resting in bed, spouse at bedside  Head: no obvious abrasions/lacerations noted  Eyes:  No sclera icterus. No conjunctival injection. Scarring of the left eye noted  Neck: Trachea midline. Resp: No accessory muscle use. Bibasilar crackles. No wheezes. No rhonchi. On supp O2, painful w/ inspiration  CV: Regular rate. Regular rhythm. No murmur. No rub. No edema   GI: distended, not tender, BS diminished and high pitched   Skin: Warm and dry. No nodule on exposed extremities. No rash on exposed extremities. Small area of ecchymosis in right posterolateral chest wall  M/S: No cyanosis. No joint deformity. No clubbing. TTP along right posterior/lateral ribs  Neuro: Awake. Grossly nonfocal    Psych: No anxiety or agitation. I Seymour Reyes have reviewed the chart on [facility] and personally interviewed and examined patient, reviewed the data (labs and imaging) and after discussion with my PA formulated the plan. Agree with note with the following edits. HPI:     I reviewed the patient's Past Medical History, Past Surgical History, Medications, and Allergies. Feels abd is more distended today   Had rectal decompression tube connected to arch[place]ta today     General:elderly female  Awake, alert and oriented.  Appears to be not in any distress  Mucous Membranes:  Pink , anicteric  Neck: No JVD, no carotid bruit, no thyromegaly  Chest:  Clear to auscultation bilaterally, no added sounds  Cardiovascular:  RRR S1S2 heard, no murmurs or gallops  Abdomen:  Soft, ++distended, non tender, no organomegaly, BS sluggish  Rectal tube in place  Extremities: No edema or cyanosis. Distal pulses well felt  Left UE subcut edema resolving  Neurological : grossly normal      Scheduled Meds:   linaclotide  145 mcg Oral QAM AC    bisacodyl  10 mg Rectal Daily    apixaban  2.5 mg Oral BID    DULoxetine  60 mg Oral Daily    atorvastatin  20 mg Oral Nightly    carbidopa-levodopa  1 tablet Oral TID    hydrALAZINE  25 mg Oral BID    lamoTRIgine  50 mg Oral BID    metoprolol tartrate  50 mg Oral BID    montelukast  10 mg Oral Nightly    NIFEdipine  60 mg Oral Daily    propafenone  150 mg Oral 3 times per day    sodium chloride flush  10 mL Intravenous 2 times per day    lidocaine  1 patch Transdermal Daily    ipratropium-albuterol  1 vial Inhalation 4x daily    fluticasone  2 puff Inhalation BID       Continuous Infusions:   dextrose         PRN Meds:  glucose, dextrose, glucagon (rDNA), dextrose, acetaminophen **OR** acetaminophen, albuterol sulfate HFA, sodium chloride flush, promethazine **OR** ondansetron, traMADol **OR** traMADol      Data:  CBC:   Recent Labs     03/08/20  0640 03/10/20  0639   WBC 9.3 12.4*   HGB 9.5* 9.1*   HCT 30.5* 28.9*   MCV 87.5 86.9    328     BMP:   Recent Labs     03/08/20  0640 03/09/20  0655 03/10/20  0639    134* 137   K 3.9 4.3 4.2    104 102   CO2 22 21 23   PHOS 2.5 2.6 2.7   BUN 21* 19 16   CREATININE 1.2 1.2 1.1     LIVER PROFILE:   No results for input(s): AST, ALT, LIPASE, BILIDIR, BILITOT, ALKPHOS in the last 72 hours. Invalid input(s): AMYLASE,  ALB   RADIOLOGY  XR ABDOMEN (KUB) (SINGLE AP VIEW)   Final Result   Slight improvement following insertion of rectal tube.       Persisting generalized ileus intracranial abnormality. XR CHEST PORTABLE   Final Result   No acute process. XR ABDOMEN (KUB) (SINGLE AP VIEW)    (Results Pending)         Assessment/Plan:    #Mechanical Fall  -Patient with Parkinson's disease who typically ambulates with a walker, had mechanical fall while walking without her walker using her 's assistance.  - PT/OT - recommending SNF    #Parkinson's Disease  - cont Sinemet at home dose    #Chest wall pain 2/2 to lung contusion  #Right rib contusion  #Pleural Effusion - Right  - poss atelectasis vs contusion? No hemoptysis. Seen by pulmonary. Appreciate recommendations. She should have a repeat chest CT in 10 to 12 weeks to document resolution of findings  -Her Eliquis was held on admission but has been resumed  -Pain control  -Encourage incentive spirometry    #Hypoxia  - suspect 2/2 above. 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 -CT a/p as above-  Distension of ascending and transverse colon  -GI consulted. Status post rectal decompression tube placement on 3/8/2020  - linzess added   -3/10: rectal tube remains in place but abdomen is becoming more distended.   Check KUB and discuss with GI about need for neostigmine    #Hyperkalemia  - 5.3-> 4.8  - held aldactone  - daily BMP     #Mild FARHEEN on CKD stage III  - baseline Cr variable~  1.9 on 1/24/2020  - Cr on admission 2.1->2.3->2.1-> 1.2 now   - hold Lasix and aldactone for now     #Leukocytosis  - 15.3->11.7, appears chronically elevated  - no apparent infection     #PAF  - regular rate and rhythm on exam  - cont BB, Rythmol  - AC held 2/2 fall, CXR with effusion, poss contusion? - now restarted eliquis     #Chronic Diastolic CHF  - appears compensated  - low Na diet, I&Os, daily weights  - cont BB, holding Lasix and  Aldactone for now    #Type II DM  - previous hx, not on diabetic meds  - last Hgb A1c: 6.4 on 1/24/20  - controlled with diet, carb control    #HTN  - stable  - cont Hydralazine, Lopressor, Nifedipine  -  lasix held for now & HCTZ    #HLD  - cont Lipitor     #COPD  - no AE  - cont HHN    #Chronic Normocytic Anemia  - Hgb 10.3  - stable, monitor      #Seizure DO  - seizure precautions  - cont Lamictal    #Morbid Obesity  - Body mass index is 41.8 kg/m². - Complicating assessment and treatment.  Placing patient at risk for multiple co-morbidities as well as early death and contributing to the patient's presentation.   - Counseled on weight loss.      #BETSY  - BiPAP QHS    DVT Prophylaxis: Eliquis  Diet: Dietary Nutrition Supplements: Clear Liquid Oral Supplement  DIET FULL LIQUID; Daily Fluid Restriction: 1500 ml  Code Status: Full Code    [facility] JACOBO  3/10/2020 10:56 AM      Agree with above  Changes made to note  Elvis Lewis MD 3/10/2020 2:26 PM

## 2020-03-11 LAB
ALBUMIN SERPL-MCNC: 1.9 G/DL (ref 3.4–5)
ANION GAP SERPL CALCULATED.3IONS-SCNC: 11 MMOL/L (ref 3–16)
BASOPHILS ABSOLUTE: 0 K/UL (ref 0–0.2)
BASOPHILS RELATIVE PERCENT: 0 %
BUN BLDV-MCNC: 13 MG/DL (ref 7–20)
CALCIUM SERPL-MCNC: 8.5 MG/DL (ref 8.3–10.6)
CHLORIDE BLD-SCNC: 104 MMOL/L (ref 99–110)
CO2: 16 MMOL/L (ref 21–32)
CREAT SERPL-MCNC: 1 MG/DL (ref 0.6–1.2)
EOSINOPHILS ABSOLUTE: 0.2 K/UL (ref 0–0.6)
EOSINOPHILS RELATIVE PERCENT: 2 %
GFR AFRICAN AMERICAN: >60
GFR NON-AFRICAN AMERICAN: 54
GLUCOSE BLD-MCNC: 102 MG/DL (ref 70–99)
GLUCOSE BLD-MCNC: 137 MG/DL (ref 70–99)
GLUCOSE BLD-MCNC: 156 MG/DL (ref 70–99)
HCT VFR BLD CALC: 31.9 % (ref 36–48)
HEMOGLOBIN: 9.6 G/DL (ref 12–16)
LYMPHOCYTES ABSOLUTE: 1.1 K/UL (ref 1–5.1)
LYMPHOCYTES RELATIVE PERCENT: 10 %
MCH RBC QN AUTO: 28 PG (ref 26–34)
MCHC RBC AUTO-ENTMCNC: 30 G/DL (ref 31–36)
MCV RBC AUTO: 93.4 FL (ref 80–100)
MONOCYTES ABSOLUTE: 0.3 K/UL (ref 0–1.3)
MONOCYTES RELATIVE PERCENT: 3 %
NEUTROPHILS ABSOLUTE: 9.7 K/UL (ref 1.7–7.7)
NEUTROPHILS RELATIVE PERCENT: 85 %
PDW BLD-RTO: 15.3 % (ref 12.4–15.4)
PERFORMED ON: ABNORMAL
PERFORMED ON: ABNORMAL
PHOSPHORUS: 3.5 MG/DL (ref 2.5–4.9)
PLATELET # BLD: 278 K/UL (ref 135–450)
PLATELET SLIDE REVIEW: ADEQUATE
PMV BLD AUTO: 7.8 FL (ref 5–10.5)
POTASSIUM SERPL-SCNC: 4.9 MMOL/L (ref 3.5–5.1)
RBC # BLD: 3.42 M/UL (ref 4–5.2)
SLIDE REVIEW: ABNORMAL
SODIUM BLD-SCNC: 131 MMOL/L (ref 136–145)
WBC # BLD: 11.4 K/UL (ref 4–11)

## 2020-03-11 PROCEDURE — 36415 COLL VENOUS BLD VENIPUNCTURE: CPT

## 2020-03-11 PROCEDURE — 2580000003 HC RX 258: Performed by: INTERNAL MEDICINE

## 2020-03-11 PROCEDURE — 85025 COMPLETE CBC W/AUTO DIFF WBC: CPT

## 2020-03-11 PROCEDURE — 6370000000 HC RX 637 (ALT 250 FOR IP): Performed by: INTERNAL MEDICINE

## 2020-03-11 PROCEDURE — 2700000000 HC OXYGEN THERAPY PER DAY

## 2020-03-11 PROCEDURE — 80069 RENAL FUNCTION PANEL: CPT

## 2020-03-11 PROCEDURE — 99232 SBSQ HOSP IP/OBS MODERATE 35: CPT | Performed by: INTERNAL MEDICINE

## 2020-03-11 PROCEDURE — 97535 SELF CARE MNGMENT TRAINING: CPT

## 2020-03-11 PROCEDURE — 94669 MECHANICAL CHEST WALL OSCILL: CPT

## 2020-03-11 PROCEDURE — 94640 AIRWAY INHALATION TREATMENT: CPT

## 2020-03-11 PROCEDURE — 1200000000 HC SEMI PRIVATE

## 2020-03-11 PROCEDURE — 94761 N-INVAS EAR/PLS OXIMETRY MLT: CPT

## 2020-03-11 PROCEDURE — 6370000000 HC RX 637 (ALT 250 FOR IP): Performed by: PHYSICIAN ASSISTANT

## 2020-03-11 RX ORDER — FUROSEMIDE 40 MG/1
40 TABLET ORAL DAILY
Status: DISCONTINUED | OUTPATIENT
Start: 2020-03-11 | End: 2020-03-13

## 2020-03-11 RX ADMIN — Medication 2 PUFF: at 07:31

## 2020-03-11 RX ADMIN — LINACLOTIDE 145 MCG: 145 CAPSULE, GELATIN COATED ORAL at 06:42

## 2020-03-11 RX ADMIN — CARBIDOPA AND LEVODOPA 1 TABLET: 25; 100 TABLET ORAL at 08:16

## 2020-03-11 RX ADMIN — APIXABAN 2.5 MG: 5 TABLET, FILM COATED ORAL at 08:17

## 2020-03-11 RX ADMIN — DULOXETINE HYDROCHLORIDE 60 MG: 60 CAPSULE, DELAYED RELEASE ORAL at 08:16

## 2020-03-11 RX ADMIN — Medication 10 ML: at 21:16

## 2020-03-11 RX ADMIN — IPRATROPIUM BROMIDE AND ALBUTEROL SULFATE 3 ML: 2.5; .5 SOLUTION RESPIRATORY (INHALATION) at 11:52

## 2020-03-11 RX ADMIN — PROPAFENONE HYDROCHLORIDE 150 MG: 150 TABLET, FILM COATED ORAL at 21:13

## 2020-03-11 RX ADMIN — METOPROLOL TARTRATE 50 MG: 50 TABLET, FILM COATED ORAL at 21:12

## 2020-03-11 RX ADMIN — CARBIDOPA AND LEVODOPA 1 TABLET: 25; 100 TABLET ORAL at 13:03

## 2020-03-11 RX ADMIN — IPRATROPIUM BROMIDE AND ALBUTEROL SULFATE 3 ML: 2.5; .5 SOLUTION RESPIRATORY (INHALATION) at 19:56

## 2020-03-11 RX ADMIN — IPRATROPIUM BROMIDE AND ALBUTEROL SULFATE 3 ML: 2.5; .5 SOLUTION RESPIRATORY (INHALATION) at 07:31

## 2020-03-11 RX ADMIN — Medication 10 ML: at 08:21

## 2020-03-11 RX ADMIN — ATORVASTATIN CALCIUM 20 MG: 10 TABLET, FILM COATED ORAL at 21:13

## 2020-03-11 RX ADMIN — LAMOTRIGINE 50 MG: 25 TABLET ORAL at 21:13

## 2020-03-11 RX ADMIN — PROPAFENONE HYDROCHLORIDE 150 MG: 150 TABLET, FILM COATED ORAL at 13:03

## 2020-03-11 RX ADMIN — PROPAFENONE HYDROCHLORIDE 150 MG: 150 TABLET, FILM COATED ORAL at 06:42

## 2020-03-11 RX ADMIN — ACETAMINOPHEN 650 MG: 325 TABLET ORAL at 01:35

## 2020-03-11 RX ADMIN — TRAMADOL HYDROCHLORIDE 100 MG: 50 TABLET, FILM COATED ORAL at 21:12

## 2020-03-11 RX ADMIN — HYDRALAZINE HYDROCHLORIDE 25 MG: 25 TABLET, FILM COATED ORAL at 21:13

## 2020-03-11 RX ADMIN — METOPROLOL TARTRATE 50 MG: 50 TABLET, FILM COATED ORAL at 08:15

## 2020-03-11 RX ADMIN — NIFEDIPINE 60 MG: 30 TABLET, FILM COATED, EXTENDED RELEASE ORAL at 08:16

## 2020-03-11 RX ADMIN — MONTELUKAST SODIUM 10 MG: 10 TABLET, COATED ORAL at 21:13

## 2020-03-11 RX ADMIN — LAMOTRIGINE 50 MG: 25 TABLET ORAL at 08:16

## 2020-03-11 RX ADMIN — FUROSEMIDE 40 MG: 40 TABLET ORAL at 12:41

## 2020-03-11 RX ADMIN — CARBIDOPA AND LEVODOPA 1 TABLET: 25; 100 TABLET ORAL at 21:12

## 2020-03-11 RX ADMIN — APIXABAN 2.5 MG: 5 TABLET, FILM COATED ORAL at 21:13

## 2020-03-11 RX ADMIN — Medication 2 PUFF: at 19:56

## 2020-03-11 RX ADMIN — IPRATROPIUM BROMIDE AND ALBUTEROL SULFATE 3 ML: 2.5; .5 SOLUTION RESPIRATORY (INHALATION) at 15:34

## 2020-03-11 RX ADMIN — TRAMADOL HYDROCHLORIDE 50 MG: 50 TABLET, FILM COATED ORAL at 13:03

## 2020-03-11 ASSESSMENT — PAIN DESCRIPTION - PAIN TYPE: TYPE: ACUTE PAIN

## 2020-03-11 ASSESSMENT — PAIN DESCRIPTION - ORIENTATION: ORIENTATION: RIGHT

## 2020-03-11 ASSESSMENT — PAIN SCALES - GENERAL
PAINLEVEL_OUTOF10: 7
PAINLEVEL_OUTOF10: 8
PAINLEVEL_OUTOF10: 3

## 2020-03-11 ASSESSMENT — PAIN DESCRIPTION - LOCATION: LOCATION: RIB CAGE

## 2020-03-11 NOTE — PROGRESS NOTES
Afshin  Age 70, wt 128kg and SrCr 1.0  Continue with home dose of eliquis  Nga Avila Pharm D 3/11/50780:02 PM  .      .

## 2020-03-11 NOTE — PROGRESS NOTES
PROGRESS NOTE  S:79 yrs Patient  admitted on 2/29/2020 with Intractable pain [R52]  Intractable pain [R52] . Today she complains of Abdominal Distension    Exam:   Vitals:    03/11/20 1153   BP:    Pulse:    Resp:    Temp:    SpO2: 97%      General appearance: alert, appears stated age, cooperative and morbidly obese  HEENT: PERRLA  Neck: no adenopathy and supple, symmetrical, trachea midline  Lungs: clear to auscultation bilaterally  Heart: regular rate and rhythm, S1, S2 normal, no murmur, click, rub or gallop  Abdomen: soft, non-tender; bowel sounds normal; no masses,  no organomegaly  Extremities: extremities normal, atraumatic, no cyanosis or edema     Medications: Reviewed    Labs:  CBC:   Recent Labs     03/10/20  0639 03/11/20  0528   WBC 12.4* 11.4*   HGB 9.1* 9.6*   HCT 28.9* 31.9*   MCV 86.9 93.4    278     BMP:   Recent Labs     03/09/20  0655 03/10/20  0639 03/11/20  0528   * 137 131*   K 4.3 4.2 4.9    102 104   CO2 21 23 16*   PHOS 2.6 2.7 3.5   BUN 19 16 13   CREATININE 1.2 1.1 1.0     Attending Supervising [de-identified] Attestation Statement  The patient is a 70 y.o. female. I have performed a history and physical examination of the patient. I discussed the case with my physician assistant Seun Spangler PA-C    I reviewed the patient's Past Medical History, Past Surgical History, Medications, and Allergies.      Physical Exam:  Vitals:    03/11/20 0915 03/11/20 1153 03/11/20 1317 03/11/20 1535   BP:   137/62    Pulse: 71  69    Resp:   18    Temp:   97.2 °F (36.2 °C)    TempSrc:   Oral    SpO2:  97% 97% 95%   Weight:       Height:           Physical Examination: General appearance - alert, well appearing, and in no distress  Mental status - alert, oriented to person, place, and time  Eyes - pupils equal and reactive, extraocular eye movements intact  Neck - supple, no significant adenopathy  Chest - clear to auscultation, no wheezes, rales or rhonchi, symmetric air entry  Heart - normal rate, regular rhythm, normal S1, S2, no murmurs, rubs, clicks or gallops  Abdomen - tenderness noted upper abd  Extremities - no pedal edema noted       Impression: 28-year-old female with a history of anemia, asthma, atrial fibrillation, CKD, diabetes, diverticulosis, GERD, HTN, HLD, and hypothyroidism admitted with fall complicated by colon pseudobstruction s/p colonoscopy w decompression. Recommendation:  1. Advance to low fiber diet  2. PT/OT  3. Minimize narcotics  4. Replenish electrolytes  5. Miralax BID  6. [facility]  12:11 PM 3/11/2020                      28-year-old female with a history of anemia, asthma, atrial fibrillation, CKD, diabetes, diverticulosis, GERD, HTN, HLD, and hypothyroidism admitted with fall complicated by colon pseudobstruction. PT/OT. Advance to low fiber diet. miralax bid. Minimize narcotics.     Dayron Huddleston MD          [phone]  [phone]

## 2020-03-11 NOTE — CARE COORDINATION
INTERDISCIPLINARY PLAN OF CARE CONFERENCE    Date/Time: 3/11/2020 11:27 AM  Completed by: Ed Esparza Case Management      Patient Name:  Patric Multani  YOB: 1940  Admitting Diagnosis: Intractable pain [R52]  Intractable pain [R52]     Admit Date/Time:  2/29/2020  4:03 PM    Chart reviewed. Interdisciplinary team met to discuss patient progress and discharge plans. Disciplines included Case Management, Nursing, and Dietitian. Current Status: IP 03/01/2020 1130    PT/OT recommendation: STR    Anticipated Discharge Date: TBD  Expected D/C Disposition:  Skilled nursing facility  Confirmed plan with patient and spouse: Yes  Discharge Plan Comments: STR      The Plan for Transition of Care is related to the following treatment goals: STR at [facility]. The Patient and spouse was provided with a choice of provider and agrees   with the discharge plan. [x] Yes [] No    Freedom of choice list was provided with basic dialogue that supports the patient's individualized plan of care/goals, treatment preferences and shares the quality data associated with the providers.  [x] Yes [] No    Home O2 in place on admit: No  Pt informed of need to bring portable home O2 tank on day of discharge for nursing to connect prior to leaving:  No  Verbalized agreement/Understanding:  No

## 2020-03-11 NOTE — ED PROVIDER NOTES
I discussed the medical management with TARIQ. Patient was having intractable pain from fall however no acute injuries were identified. She was admitted for further pain control. I was unable to see the patient prior to her going upstairs to the floor. All diagnostic, treatment, and disposition decisions were made by myself in conjunction with the advanced practice provider. For all further details of the patient's emergency department visit, please see the advanced practice provider's documentation. Comment: Please note this report has been produced using speech recognition software and may contain errors related to that system including errors in grammar, punctuation, and spelling, as well as words and phrases that may be inappropriate. If there are any questions or concerns please feel free to contact the dictating provider for clarification.          Cheng Can  03/11/20 0111

## 2020-03-11 NOTE — PROGRESS NOTES
Progress Note    Admit Date:  2/29/2020      Subjective:  Ms. Nila Lopez better. Rectal tube removed this morning     Objective:   Patient Vitals for the past 4 hrs:   BP Pulse SpO2   03/11/20 0915 -- 71 --   03/11/20 0815 (!) 164/69 71 --   03/11/20 0731 -- -- 96 %            Intake/Output Summary (Last 24 hours) at 3/11/2020 1016  Last data filed at 3/11/2020 0839  Gross per 24 hour   Intake 1182 ml   Output --   Net 1182 ml       Physical Exam:    Gen: No distress. Alert. Morbidly obese  female, resting in bed, spouse at bedside  Head: no obvious abrasions/lacerations noted  Eyes:  No sclera icterus. No conjunctival injection. Scarring of the left eye noted  Neck: Trachea midline. Resp: No accessory muscle use. Bibasilar crackles. No wheezes. No rhonchi. On supp O2, painful w/ inspiration  CV: Regular rate. Regular rhythm. No murmur. No rub. No edema   GI: distended, not tender, BS diminished   Skin: Warm and dry. No nodule on exposed extremities. No rash on exposed extremities. Small area of ecchymosis in right posterolateral chest wall  M/S: No cyanosis. No joint deformity. No clubbing. TTP along right posterior/lateral ribs  Neuro: Awake. Grossly nonfocal    Psych: No anxiety or agitation. I Buckley Bence have reviewed the chart on Ayana Dunlap and personally interviewed and examined patient, reviewed the data (labs and imaging) and after discussion with my PA formulated the plan. Agree with note with the following edits. HPI:     I reviewed the patient's Past Medical History, Past Surgical History, Medications, and Allergies. Feels abd is less distended today   Had rectal decompression tube has been removed    General:elderly female  Awake, alert and oriented.  Appears to be not in any distress  Mucous Membranes:  Pink , anicteric  Neck: No JVD, no carotid bruit, no thyromegaly  Chest:  Clear to auscultation bilaterally, no added sounds  Right Lower rib cage pain Cardiovascular:  RRR S1S2 heard, no murmurs or gallops  Abdomen:  Soft, ++distended, non tender, no organomegaly, BS sluggish  Extremities: No edema or cyanosis. Distal pulses well felt  Left UE subcut edema resolving  Neurological : grossly normal      Scheduled Meds:   linaclotide  145 mcg Oral QAM AC    bisacodyl  10 mg Rectal Daily    apixaban  2.5 mg Oral BID    DULoxetine  60 mg Oral Daily    atorvastatin  20 mg Oral Nightly    carbidopa-levodopa  1 tablet Oral TID    hydrALAZINE  25 mg Oral BID    lamoTRIgine  50 mg Oral BID    metoprolol tartrate  50 mg Oral BID    montelukast  10 mg Oral Nightly    NIFEdipine  60 mg Oral Daily    propafenone  150 mg Oral 3 times per day    sodium chloride flush  10 mL Intravenous 2 times per day    lidocaine  1 patch Transdermal Daily    ipratropium-albuterol  1 vial Inhalation 4x daily    fluticasone  2 puff Inhalation BID       Continuous Infusions:   dextrose         PRN Meds:  glucose, dextrose, glucagon (rDNA), dextrose, acetaminophen **OR** acetaminophen, albuterol sulfate HFA, sodium chloride flush, promethazine **OR** ondansetron, traMADol **OR** traMADol      Data:  CBC:   Recent Labs     03/10/20  0639 03/11/20  0528   WBC 12.4* 11.4*   HGB 9.1* 9.6*   HCT 28.9* 31.9*   MCV 86.9 93.4    278     BMP:   Recent Labs     03/09/20  0655 03/10/20  0639 03/11/20  0528   * 137 131*   K 4.3 4.2 4.9    102 104   CO2 21 23 16*   PHOS 2.6 2.7 3.5   BUN 19 16 13   CREATININE 1.2 1.1 1.0     LIVER PROFILE:   No results for input(s): AST, ALT, LIPASE, BILIDIR, BILITOT, ALKPHOS in the last 72 hours. Invalid input(s): AMYLASE,  ALB   RADIOLOGY  XR ABDOMEN (KUB) (SINGLE AP VIEW)   Final Result   1. Colonic tube remains in place. 2. Diffuse colonic distension, slightly improved since 3/9/2020 as described   above. 3. No obvious free air or pneumatosis on supine view.          XR ABDOMEN (KUB) (SINGLE AP VIEW)   Final Result   Slight the cervical spine. CT Head WO Contrast   Final Result   No acute intracranial abnormality. XR CHEST PORTABLE   Final Result   No acute process. Assessment/Plan:    #Mechanical Fall  -Patient with Parkinson's disease who typically ambulates with a walker, had mechanical fall while walking without her walker using her 's assistance.  - PT/OT - recommending SNF    #Parkinson's Disease  - cont Sinemet at home dose    #Chest wall pain 2/2 to lung contusion  #Right rib contusion  #Pleural Effusion - Right  - poss atelectasis vs contusion? No hemoptysis. Seen by pulmonary. Appreciate recommendations. She should have a repeat chest CT in 10 to 12 weeks to document resolution of findings  -Her Eliquis was held on admission but has been resumed  -Pain control  -Encourage incentive spirometry and PT OT     #Hypoxia  - suspect 2/2 above. 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 -CT a/p as above-  Distension of ascending and transverse colon  -GI consulted. Status post rectal decompression tube placement on 3/8/2020  - linzess added   -3/10: rectal tube remains in place but abdomen is becoming more distended. Checked KUB - improved  3/11: Rectal tube removed.   Start full liquids and advance to low fiber as tolerated    #Hyperkalemia  - 5.3-> 4.8  - held aldactone  - daily BMP     #Mild FARHEEN on CKD stage III  - baseline Cr variable~  1.9 on 1/24/2020  - Cr on admission 2.1->2.3->2.1-> 1.2 now   - hold aldactone for now, resume lasix     #Leukocytosis  - 15.3->11.7, appears chronically elevated  - no apparent infection     #PAF  - regular rate and rhythm on exam  - cont BB, Rythmol  - AC held 2/2 fall, CXR with effusion, poss contusion? - now restarted eliquis     #Chronic Diastolic CHF  - appears compensated  - low Na diet, I&Os, daily weights  - cont BB, holding Aldactone for now  - resume lasix     #Type II DM  - previous hx, not on diabetic meds  - last Hgb A1c: 6.4 on 1/24/20  - controlled with diet, carb control    #HTN  - stable  - cont Hydralazine, Lopressor, Nifedipine  -  lasix held for now & HCTZ    #HLD  - cont Lipitor     #COPD  - no AE  - cont HHN    #Chronic Normocytic Anemia  - Hgb 10.3  - stable, monitor      #Seizure DO  - seizure precautions  - cont Lamictal    #Morbid Obesity  - Body mass index is 41.8 kg/m². - Complicating assessment and treatment.  Placing patient at risk for multiple co-morbidities as well as early death and contributing to the patient's presentation.   - Counseled on weight loss.      #BETSY  - BiPAP QHS    DVT Prophylaxis: Eliquis  Diet: Dietary Nutrition Supplements: Clear Liquid Oral Supplement  DIET FULL LIQUID; Carb Control: 4 carb choices (60 gms)/meal; Daily Fluid Restriction: 1500 ml  Code Status: Full Code    DISPO: advance diet, cont PT, hopeful dc to SNF tomorrow     Ynes Stewart PA-C  3/11/2020 10:16 AM        Agree with above  Changes made to note    Pasquale Donovan MD 3/11/2020 10:16 AM

## 2020-03-11 NOTE — PROGRESS NOTES
.Occupational Therapy Daily Treatment Note    Unit: [facility]  Date:  3/11/2020  Patient Name:    Jennifer Sequeira  Admitting diagnosis:  Intractable pain [R52]  Intractable pain [R52]  Admit Date:  2020  Precautions/Restrictions:fall risk, IV, bed/chair alarm, supplemental O2 (3L) and purewick catheter      Discharge Recommendations: SNF  DME needs for discharge: defer to facility       Therapy recommendations for staff:   Assist of 2 (moderate assist) with use of STEDY for all transfers to/from BSC/chair    AM-PAC Score: 11  Home Health S4 Level: NA       Treatment Time:  4180-3410  Treatment number: 4  Total Treatment Time:   55 minutes      Subjective:  Pt supine in bed; agreeable to therapy; pleasant and cooperative    Pain   Yes  Ratin/10 at rest; 10/10 with movement  Location:right side (ribs)  Pain Medicine Status: Denies need and No request made      Bed Mobility:   Supine to Sit:  Mod A of 2  Sit to Supine:  Not Tested  Rolling:           Not Tested  Scooting: Mod A    Transfer Training:   Sit to stand: Mod A of 2 from EOB, progressing to Min A/CGA  From STEDY paddles x multiple bouts for korey care in STEDY and change of depends   Stand to sit:  CGA to STEDY paddles, Mod A to chair to control descent  Bed to Chair:  Mod A of 2 (STEDY used for transfer to focus on standing tolerance and ADLs after transfer to chair and ADLs  Bed to [facility]:   Not Tested  Standard toilet:   Not Tested    Activity Tolerance   Pt completed multiple short bouts of standing (1-3 min) in STEDY this date in preparation for participation in ADLs. Pt completed therapy session with Pain noted with any movement  SpO2 low of 88 after initial transfer, recovering to 92% with PLB      ADL Training:   Upper body dressing: Mod A  Upper body bathing: Mod A  Lower body dressing:  Max A  Lower body bathing:  Max A  Toileting:    Max A  Grooming/Hygiene:  Not Tested    Therapeutic Exercise:   N/A    Patient Education:   Role of OT  Recommendations for DC    Positioning Needs:   Reclined in chair with call light and needs in reach. Alarm Set    Family Present:  Yes    Assessment: Pt with good participation this date and increased tolerance for activity. GOALS  1). Bed to Hawa Washburn 13 and with use of RW     To be met in 5 Visits:  1). Supine to Sit: Supervision  2). Upper Body Bathing:  Min A  3). Lower Body Bathing:  Min A with appropriate AE  4). Upper Body Dressing: Min A  5). Lower Body Dressing: Min A with appropriate AE   6).  Pt to kimo UE exs x 15 reps     Plan: cont with POC        Sarika Ortiz OTR/L  Lic # [id number]       If patient discharges from this facility prior to next visit, this note will serve as the Discharge Summary

## 2020-03-12 ENCOUNTER — APPOINTMENT (OUTPATIENT)
Dept: GENERAL RADIOLOGY | Age: 80
DRG: 205 | End: 2020-03-12
Payer: MEDICARE

## 2020-03-12 LAB
ALBUMIN SERPL-MCNC: 2.7 G/DL (ref 3.4–5)
ANION GAP SERPL CALCULATED.3IONS-SCNC: 13 MMOL/L (ref 3–16)
BASOPHILS ABSOLUTE: 0 K/UL (ref 0–0.2)
BASOPHILS RELATIVE PERCENT: 0.2 %
BUN BLDV-MCNC: 13 MG/DL (ref 7–20)
CALCIUM SERPL-MCNC: 8.7 MG/DL (ref 8.3–10.6)
CHLORIDE BLD-SCNC: 98 MMOL/L (ref 99–110)
CO2: 22 MMOL/L (ref 21–32)
CREAT SERPL-MCNC: 1.1 MG/DL (ref 0.6–1.2)
EOSINOPHILS ABSOLUTE: 0.1 K/UL (ref 0–0.6)
EOSINOPHILS RELATIVE PERCENT: 0.9 %
GFR AFRICAN AMERICAN: 58
GFR NON-AFRICAN AMERICAN: 48
GLUCOSE BLD-MCNC: 104 MG/DL (ref 70–99)
HCT VFR BLD CALC: 28.9 % (ref 36–48)
HEMOGLOBIN: 9.3 G/DL (ref 12–16)
LYMPHOCYTES ABSOLUTE: 1 K/UL (ref 1–5.1)
LYMPHOCYTES RELATIVE PERCENT: 6.8 %
MCH RBC QN AUTO: 27.6 PG (ref 26–34)
MCHC RBC AUTO-ENTMCNC: 32.1 G/DL (ref 31–36)
MCV RBC AUTO: 86.2 FL (ref 80–100)
MONOCYTES ABSOLUTE: 1.3 K/UL (ref 0–1.3)
MONOCYTES RELATIVE PERCENT: 8.7 %
NEUTROPHILS ABSOLUTE: 12.3 K/UL (ref 1.7–7.7)
NEUTROPHILS RELATIVE PERCENT: 83.4 %
PDW BLD-RTO: 14.2 % (ref 12.4–15.4)
PHOSPHORUS: 3.2 MG/DL (ref 2.5–4.9)
PLATELET # BLD: 344 K/UL (ref 135–450)
PMV BLD AUTO: 7.7 FL (ref 5–10.5)
POTASSIUM SERPL-SCNC: 3.5 MMOL/L (ref 3.5–5.1)
RBC # BLD: 3.35 M/UL (ref 4–5.2)
SODIUM BLD-SCNC: 133 MMOL/L (ref 136–145)
WBC # BLD: 14.8 K/UL (ref 4–11)

## 2020-03-12 PROCEDURE — 1200000000 HC SEMI PRIVATE

## 2020-03-12 PROCEDURE — 80069 RENAL FUNCTION PANEL: CPT

## 2020-03-12 PROCEDURE — 6370000000 HC RX 637 (ALT 250 FOR IP): Performed by: INTERNAL MEDICINE

## 2020-03-12 PROCEDURE — 2700000000 HC OXYGEN THERAPY PER DAY

## 2020-03-12 PROCEDURE — 36415 COLL VENOUS BLD VENIPUNCTURE: CPT

## 2020-03-12 PROCEDURE — 97530 THERAPEUTIC ACTIVITIES: CPT

## 2020-03-12 PROCEDURE — 94640 AIRWAY INHALATION TREATMENT: CPT

## 2020-03-12 PROCEDURE — 94669 MECHANICAL CHEST WALL OSCILL: CPT

## 2020-03-12 PROCEDURE — 71045 X-RAY EXAM CHEST 1 VIEW: CPT

## 2020-03-12 PROCEDURE — 97112 NEUROMUSCULAR REEDUCATION: CPT

## 2020-03-12 PROCEDURE — 85025 COMPLETE CBC W/AUTO DIFF WBC: CPT

## 2020-03-12 PROCEDURE — 74018 RADEX ABDOMEN 1 VIEW: CPT

## 2020-03-12 PROCEDURE — 6370000000 HC RX 637 (ALT 250 FOR IP): Performed by: PHYSICIAN ASSISTANT

## 2020-03-12 PROCEDURE — 99232 SBSQ HOSP IP/OBS MODERATE 35: CPT | Performed by: INTERNAL MEDICINE

## 2020-03-12 PROCEDURE — 94150 VITAL CAPACITY TEST: CPT

## 2020-03-12 PROCEDURE — 97535 SELF CARE MNGMENT TRAINING: CPT

## 2020-03-12 PROCEDURE — 94761 N-INVAS EAR/PLS OXIMETRY MLT: CPT

## 2020-03-12 RX ORDER — SENNA AND DOCUSATE SODIUM 50; 8.6 MG/1; MG/1
2 TABLET, FILM COATED ORAL DAILY
Status: DISCONTINUED | OUTPATIENT
Start: 2020-03-12 | End: 2020-03-20 | Stop reason: HOSPADM

## 2020-03-12 RX ORDER — OXYCODONE HYDROCHLORIDE AND ACETAMINOPHEN 5; 325 MG/1; MG/1
1 TABLET ORAL EVERY 4 HOURS PRN
Status: DISCONTINUED | OUTPATIENT
Start: 2020-03-12 | End: 2020-03-20 | Stop reason: HOSPADM

## 2020-03-12 RX ORDER — OXYCODONE HYDROCHLORIDE AND ACETAMINOPHEN 5; 325 MG/1; MG/1
2 TABLET ORAL EVERY 4 HOURS PRN
Status: DISCONTINUED | OUTPATIENT
Start: 2020-03-12 | End: 2020-03-20 | Stop reason: HOSPADM

## 2020-03-12 RX ORDER — SPIRONOLACTONE 25 MG/1
25 TABLET ORAL DAILY
Status: DISCONTINUED | OUTPATIENT
Start: 2020-03-12 | End: 2020-03-14

## 2020-03-12 RX ADMIN — LAMOTRIGINE 50 MG: 25 TABLET ORAL at 08:28

## 2020-03-12 RX ADMIN — TRAMADOL HYDROCHLORIDE 100 MG: 50 TABLET, FILM COATED ORAL at 09:26

## 2020-03-12 RX ADMIN — HYDRALAZINE HYDROCHLORIDE 25 MG: 25 TABLET, FILM COATED ORAL at 20:30

## 2020-03-12 RX ADMIN — CARBIDOPA AND LEVODOPA 1 TABLET: 25; 100 TABLET ORAL at 14:33

## 2020-03-12 RX ADMIN — NIFEDIPINE 60 MG: 30 TABLET, FILM COATED, EXTENDED RELEASE ORAL at 08:29

## 2020-03-12 RX ADMIN — LAMOTRIGINE 50 MG: 25 TABLET ORAL at 20:30

## 2020-03-12 RX ADMIN — ATORVASTATIN CALCIUM 20 MG: 10 TABLET, FILM COATED ORAL at 20:30

## 2020-03-12 RX ADMIN — METOPROLOL TARTRATE 50 MG: 50 TABLET, FILM COATED ORAL at 20:30

## 2020-03-12 RX ADMIN — PROPAFENONE HYDROCHLORIDE 150 MG: 150 TABLET, FILM COATED ORAL at 06:00

## 2020-03-12 RX ADMIN — PROPAFENONE HYDROCHLORIDE 150 MG: 150 TABLET, FILM COATED ORAL at 20:30

## 2020-03-12 RX ADMIN — APIXABAN 2.5 MG: 5 TABLET, FILM COATED ORAL at 08:28

## 2020-03-12 RX ADMIN — OXYCODONE HYDROCHLORIDE AND ACETAMINOPHEN 2 TABLET: 5; 325 TABLET ORAL at 11:56

## 2020-03-12 RX ADMIN — HYDRALAZINE HYDROCHLORIDE 25 MG: 25 TABLET, FILM COATED ORAL at 08:28

## 2020-03-12 RX ADMIN — MONTELUKAST SODIUM 10 MG: 10 TABLET, COATED ORAL at 20:30

## 2020-03-12 RX ADMIN — FUROSEMIDE 40 MG: 40 TABLET ORAL at 08:28

## 2020-03-12 RX ADMIN — LINACLOTIDE 145 MCG: 145 CAPSULE, GELATIN COATED ORAL at 07:31

## 2020-03-12 RX ADMIN — DULOXETINE HYDROCHLORIDE 60 MG: 60 CAPSULE, DELAYED RELEASE ORAL at 08:28

## 2020-03-12 RX ADMIN — Medication 2 PUFF: at 19:41

## 2020-03-12 RX ADMIN — IPRATROPIUM BROMIDE AND ALBUTEROL SULFATE 3 ML: 2.5; .5 SOLUTION RESPIRATORY (INHALATION) at 19:41

## 2020-03-12 RX ADMIN — APIXABAN 2.5 MG: 5 TABLET, FILM COATED ORAL at 20:30

## 2020-03-12 RX ADMIN — PROPAFENONE HYDROCHLORIDE 150 MG: 150 TABLET, FILM COATED ORAL at 14:32

## 2020-03-12 RX ADMIN — METOPROLOL TARTRATE 50 MG: 50 TABLET, FILM COATED ORAL at 08:29

## 2020-03-12 RX ADMIN — IPRATROPIUM BROMIDE AND ALBUTEROL SULFATE 3 ML: 2.5; .5 SOLUTION RESPIRATORY (INHALATION) at 16:17

## 2020-03-12 RX ADMIN — IPRATROPIUM BROMIDE AND ALBUTEROL SULFATE 3 ML: 2.5; .5 SOLUTION RESPIRATORY (INHALATION) at 07:20

## 2020-03-12 RX ADMIN — IPRATROPIUM BROMIDE AND ALBUTEROL SULFATE 3 ML: 2.5; .5 SOLUTION RESPIRATORY (INHALATION) at 11:35

## 2020-03-12 RX ADMIN — Medication 2 PUFF: at 07:20

## 2020-03-12 RX ADMIN — SPIRONOLACTONE 25 MG: 25 TABLET ORAL at 11:56

## 2020-03-12 RX ADMIN — OXYCODONE HYDROCHLORIDE AND ACETAMINOPHEN 2 TABLET: 5; 325 TABLET ORAL at 18:19

## 2020-03-12 RX ADMIN — CARBIDOPA AND LEVODOPA 1 TABLET: 25; 100 TABLET ORAL at 08:29

## 2020-03-12 RX ADMIN — CARBIDOPA AND LEVODOPA 1 TABLET: 25; 100 TABLET ORAL at 20:30

## 2020-03-12 RX ADMIN — TRAMADOL HYDROCHLORIDE 100 MG: 50 TABLET, FILM COATED ORAL at 02:50

## 2020-03-12 ASSESSMENT — PAIN SCALES - GENERAL
PAINLEVEL_OUTOF10: 3
PAINLEVEL_OUTOF10: 8
PAINLEVEL_OUTOF10: 2
PAINLEVEL_OUTOF10: 7
PAINLEVEL_OUTOF10: 8
PAINLEVEL_OUTOF10: 7
PAINLEVEL_OUTOF10: 6

## 2020-03-12 NOTE — PROGRESS NOTES
Inpatient Physical Therapy Daily Treatment Note    Unit: [facility]  Date:  3/12/2020  Patient Name:    Narciso Davidson  Admitting diagnosis:  Intractable pain [R52]  Intractable pain [R52]  Admit Date:  2/29/2020  Precautions/Restrictions:  fall risk, bed/chair alarm and purewick catheter      Discharge Recommendations: SNF  DME needs for discharge: defer to facility       Therapy recommendation for EMS Transport: requires transport by cot due to difficulty with transfers    Therapy recommendations for staff:   Assist of 2 with use of STEDY for all transfers to/from BSC/chair     History of Present Illness: Pt with  Hx of PD was attempting to transfer to her car in her garage when she states her legs gave out and she fell, hitting her side on the running board of her car.  Severe pain.  Pt was unable to ambulate following the fall 2/2 pain & weakness. Work up in the ED showed a small effusion on the right poss related to atelectasis. CT cervical spine and head were negative. PMH: Parkinson's Disease, Asthma, PAF, CKD stage III, DM controlled with diet, HTN, HLD     Home Health S4 Level Recommendation: NA  AM-PAC Mobility Score   AM-PAC Inpatient Mobility Raw Score : 10       Treatment Time:  15:00-15:54  Treatment number: 8  Timed Code Treatment Minutes: 54 minutes  Total Treatment Minutes:  54 minutes    Cognition    A&O orientation not directly assessed. Pt appears oriented during session. Able to follow 2 step commands    Subjective  Patient lying supine in bed with spouse present  Pt agreeable to this PT tx.      Pain   Yes  Location: R ribs  Rating:  severe/10 during mobility, mild/10 at rest  Pain Medicine Status: No request made, pt resting comfortably at end of session     Bed Mobility   Supine to Sit:   Mod A x 2   Sit to Supine:  Not Tested , pt in chair to end session  Rolling:   Not Tested  Scooting:   CGA with increased time    Transfer Training     Sit to stand:   Min A x 1 and Mod A x 1 from elevated bed and elevated BSC  Stand to sit:   Min A of 2;   Bed to [facility] and BSC to Chair:  Min A of 2 with use of rolling walker (RW) and gait belt    Gait Training gait deferred due to difficulty with transfers; pt ambulated 0 ft. Stair Training deferred, pt unsafe/not appropriate to complete stairs at this time      Therapeutic Exercise Veena deferred for focus on functional mobility (disregard section)  Ankle pumps:  Quad sets:   Glut sets:   Heel slides:   SLR:   Hip abd/add:   LAQ:   Marching:     Balance  Static Sitting:  Good -    Tolerance:   Dynamic Sitting:  Fair +  Static Standing: Fair -  Dynamic Standing: Not tested    Patient Education      Role of PT, POC, Discharge recommendations, DC recommendations, safety awareness, transfer techniques, pursed lip breathing and calling for assist with mobility. Positioning Needs       Pt instructed and educated on use of call light to call for assist if desiring to get up or change position, call light handed to pt and needs within reach, Pt reclined in chair, alarm set, call light provided and all needs within reach and pillows placed for support/comfort. Pt is unable to demonstrate getting out of the recliner without assistance but is alert and oriented x4. Pt is able to verbalize understanding of use of call light and provides informed consent for use of recliner. Pt left in recliner as noted above. Activity Tolerance   Pt completed therapy session with Pain noted with all mobility in R ribs. Vitals not recorded. Other  Pericare performed in standing with RW, see OT note for assist.     Assessment :  Patient continues to demo significantly decreased activity tolerance and functional mobility compared to baseline as a function of rib pain and accumulating weakness. Pt tolerated standing with RW and 2 SPTs with RW but fatigued quickly and required heavy encouragment and cuing to complete SPTs. Continue to recommend SNF at discharge.        Goals (all

## 2020-03-12 NOTE — PROGRESS NOTES
PM assessment completed. See flow sheet. Pt A&O X 4. Resp E&E with no distress noted. PRN pain meds given per request, pt rated abdominal pain 8/10. SEE MAR. Pt.denies any further needs at this time. Call light within reach. Will continue to monitor.

## 2020-03-12 NOTE — PROGRESS NOTES
issues; expresses adequate intake no longer drinking the ensure clear   · Wound Type: Pressure Ulcer, Stage II(R buttocks)  · Current Nutrition Therapies:  · Oral Diet Orders: Low Fiber, Fluid Restriction   · Oral Diet intake: %  · Oral Nutrition Supplement (ONS) Orders: Clear Liquid Oral Supplement  · ONS intake: (none)  · Anthropometric Measures:  · Ht: 5' 6\" (167.6 cm)   · Current Body Wt: 283 lb (128.4 kg)  · Admission Body Wt: 259 lb (117.5 kg)(Stated)  · Usual Body Wt: (258-259# per pt (weighed daily at home))  · % Weight Change:  ,  + 32# or + 12.8% x 6 months  · Ideal Body Wt: 130 lb (59 kg), % Ideal Body 217%  · BMI Classification: BMI > or equal to 40.0 Obese Class III(45.7 BMI)    Nutrition Interventions:   Continue current diet, Discontinue ONS  Continued Inpatient Monitoring, Coordination of Care, Coordination of Community Care    Nutrition Evaluation:   · Evaluation: Goals set   · Goals: pt will continue to consume > 50% of meals and wound will show signs of healing; weight will remain stable at 283#    · Monitoring: Meal Intake, Weight, Monitor Bowel Function      Electronically signed by Gregg Dunlap RD, LD on 3/12/20 at 6:07 PM EDT    Contact Number: [phone]

## 2020-03-12 NOTE — PROGRESS NOTES
.Occupational Therapy Daily Treatment Note    Unit: [facility]  Date:  3/12/2020  Patient Name:    Nayely [place]  Admitting diagnosis:  Intractable pain [R52]  Intractable pain [R52]  Admit Date:  2020  Precautions/Restrictions:fall risk, IV, bed/chair alarm, supplemental O2 (3L) and purewick catheter      Discharge Recommendations: SNF  DME needs for discharge: defer to facility       Therapy recommendations for staff:   Assist of 2 (moderate assist) with use of STEDY for all transfers to/from BSC/chair    AM-PAC Score: 11  Home Health S4 Level: NA       Treatment Time:  7784-1948  Treatment number: 5  Total Treatment Time:   55 minutes      Subjective:  Pt supine in bed; agreeable to therapy; pleasant and cooperative    Pain   Yes  Ratin/10 at rest; significant  with movement  Location:right side (ribs)  Pain Medicine Status: Denies need and No request made      Bed Mobility:   Supine to Sit:  Mod assist   Sit to Supine:  Not Tested  Rolling:           Not Tested  Scooting: Mod A    Transfer Training:   Sit to stand:   Min assist of two  from EOB,and stedy   Stand to sit:  Min to STEDY paddles, Mod A to max  to chair to control descent  Bed to Chair:  stedy used for transfer   Bed to [facility]:   stedy   Standard toilet:   Not Tested    Activity Tolerance   Pt completed multiple short bouts of standing (58, 45, 63 seconds ) in STEDY this date. Pt completed therapy session with Pain noted with any movement  SpO2 95- 96% after initial transfer      ADL Training:   Upper body dressing: NT  Upper body bathing:  Max assist to wipe off back   Lower body dressing:  NT  Lower body bathing:  NT  Toileting: Max A to wipe after commode use   Grooming/Hygiene:  Not Tested    Therapeutic Exercise:   Shoulder shrugs 10x     Patient Education:   Role of OT  Recommendations for DC    Positioning Needs:   Reclined in chair with call light and needs in reach.    Alarm Set  present     Family Present:

## 2020-03-12 NOTE — PROGRESS NOTES
Bedside report given and pt care transferred to [facility]. Pt denies needs at this time. Call light within reach.

## 2020-03-12 NOTE — PLAN OF CARE
Nutrition Problem: Inadequate oral intake  Intervention: Food and/or Nutrient Delivery: Continue current diet, Discontinue ONS  Nutritional Goals: pt will continue to consume > 50% of meals and wound will show signs of healing; weight will remain stable at 283#

## 2020-03-12 NOTE — PROGRESS NOTES
PROGRESS NOTE  S:79 yrs Patient  admitted on 2/29/2020 with Intractable pain [R52]  Intractable pain [R52] . Today she complains of Abdominal Distension     Exam:   Vitals:    03/12/20 1354   BP: 139/66   Pulse: 73   Resp: 17   Temp: 97.9 °F (36.6 °C)   SpO2: 93%      General appearance: alert, appears stated age, cooperative, fatigued and morbidly obese  HEENT: PERRLA  Neck: no adenopathy and supple, symmetrical, trachea midline  Lungs: wheezes bilaterally  Heart: regular rate and rhythm, S1, S2 normal, no murmur, click, rub or gallop  Abdomen: abnormal findings:  distended and obese  Extremities: extremities normal, atraumatic, no cyanosis or edema     Medications: Reviewed    Labs:  CBC:   Recent Labs     03/10/20  0639 03/11/20  0528 03/12/20  0542   WBC 12.4* 11.4* 14.8*   HGB 9.1* 9.6* 9.3*   HCT 28.9* 31.9* 28.9*   MCV 86.9 93.4 86.2    278 344     BMP:   Recent Labs     03/10/20  0639 03/11/20  0528 03/12/20  0542    131* 133*   K 4.2 4.9 3.5    104 98*   CO2 23 16* 22   PHOS 2.7 3.5 3.2   BUN 16 13 13   CREATININE 1.1 1.0 1.1     LIVER PROFILE: No results for input(s): AST, ALT, LIPASE, PROT, BILIDIR, BILITOT, ALKPHOS in the last 72 hours. Invalid input(s): AMYLASE,  ALB    IMAGING:  XR ABDOMEN (KUB) (SINGLE AP VIEW)   Impression:     Stable intestinal distension status post rectal tube removal     XR CHEST PORTABLE   Impression:     Cardiomegaly with interval decrease in size of bilateral pleural effusions     Attending Supervising [de-identified] Attestation Statement  The patient is a 70 y.o. female. I have performed a history and physical examination of the patient. I discussed the case with my physician assistant Nessa Cabrera PA-C    I reviewed the patient's Past Medical History, Past Surgical History, Medications, and Allergies.      Physical Exam:  Vitals:    03/12/20 0822 03/12/20 1137 03/12/20 1354 03/12/20 1618   BP: (!) 148/64  139/66

## 2020-03-12 NOTE — PROGRESS NOTES
Progress Note    Admit Date:  2/29/2020    Subjective:  Ms. Thompson Pain feels the same. She does not think her abdominal distention is improved. She had diarrhea x 2 this AM.  Her pain is not controlled on the tramadol   - remains on 3L NC O2  - no fevers  - WBC up to 14k, + cough    Objective:   Patient Vitals for the past 4 hrs:   BP Temp Temp src Pulse Resp SpO2   03/12/20 0822 (!) 148/64 98.2 °F (36.8 °C) Oral 75 18 97 %   03/12/20 0722 -- -- -- -- -- 93 %        Intake/Output Summary (Last 24 hours) at 3/12/2020 1057  Last data filed at 3/12/2020 1035  Gross per 24 hour   Intake 744 ml   Output 1300 ml   Net -556 ml     Physical Exam:    Gen: No distress. Alert. Morbidly obese  female, resting in bed, spouse at bedside  Head: no obvious abrasions/lacerations noted  Eyes:  No sclera icterus. No conjunctival injection. Scarring of the left eye noted  Neck: Trachea midline. Resp: No accessory muscle use. +Bibasilar crackles. No wheezes. No rhonchi. On supp O2 3L NC O2, painful w/ inspiration  CV: Regular rate. Regular rhythm. No murmur. No rub. No edema   GI: ++distended, not tender, BS diminished   Skin: Warm and dry. No nodule on exposed extremities. No rash on exposed extremities. Small area of ecchymosis in right posterolateral chest wall  M/S: No cyanosis. No joint deformity. No clubbing. TTP along right posterior/lateral ribs  Neuro: Awake. Grossly nonfocal    Psych: No anxiety or agitation. RANDELL Del Cid have reviewed the chart on Pasquale Be and personally interviewed and examined patient, reviewed the data (labs and imaging) and after discussion with my PA formulated the plan. Agree with note with the following edits. HPI:     I reviewed the patient's Past Medical History, Past Surgical History, Medications, and Allergies. Feels abd is more distended today   Had rectal decompression tube has been removed    General:elderly female  Awake, alert and oriented.  Appears to be not in any distress  Mucous Membranes:  Pink , anicteric  Neck: No JVD, no carotid bruit, no thyromegaly  Chest:  Clear to auscultation bilaterally, no added sounds  Right Lower rib cage pain   Cardiovascular:  RRR S1S2 heard, no murmurs or gallops  Abdomen:  Soft, ++distended, non tender, no organomegaly, BS sluggish  Extremities: No edema or cyanosis. Distal pulses well felt  Left UE subcut edema resolving  Neurological : grossly normal    Scheduled Meds:   spironolactone  25 mg Oral Daily    furosemide  40 mg Oral Daily    linaclotide  145 mcg Oral QAM AC    bisacodyl  10 mg Rectal Daily    apixaban  2.5 mg Oral BID    DULoxetine  60 mg Oral Daily    atorvastatin  20 mg Oral Nightly    carbidopa-levodopa  1 tablet Oral TID    hydrALAZINE  25 mg Oral BID    lamoTRIgine  50 mg Oral BID    metoprolol tartrate  50 mg Oral BID    montelukast  10 mg Oral Nightly    NIFEdipine  60 mg Oral Daily    propafenone  150 mg Oral 3 times per day    sodium chloride flush  10 mL Intravenous 2 times per day    lidocaine  1 patch Transdermal Daily    ipratropium-albuterol  1 vial Inhalation 4x daily    fluticasone  2 puff Inhalation BID       Continuous Infusions:   dextrose         PRN Meds:  glucose, dextrose, glucagon (rDNA), dextrose, acetaminophen **OR** acetaminophen, albuterol sulfate HFA, sodium chloride flush, promethazine **OR** ondansetron, traMADol **OR** traMADol      Data:  CBC:   Recent Labs     03/10/20  0639 03/11/20  0528 03/12/20  0542   WBC 12.4* 11.4* 14.8*   HGB 9.1* 9.6* 9.3*   HCT 28.9* 31.9* 28.9*   MCV 86.9 93.4 86.2    278 344     BMP:   Recent Labs     03/10/20  0639 03/11/20  0528 03/12/20  0542    131* 133*   K 4.2 4.9 3.5    104 98*   CO2 23 16* 22   PHOS 2.7 3.5 3.2   BUN 16 13 13   CREATININE 1.1 1.0 1.1     LIVER PROFILE:   No results for input(s): AST, ALT, LIPASE, BILIDIR, BILITOT, ALKPHOS in the last 72 hours. Invalid input(s):   AMYLASE,  ALB RADIOLOGY  XR ABDOMEN (KUB) (SINGLE AP VIEW)   Final Result   1. Colonic tube remains in place. 2. Diffuse colonic distension, slightly improved since 3/9/2020 as described   above. 3. No obvious free air or pneumatosis on supine view. XR ABDOMEN (KUB) (SINGLE AP VIEW)   Final Result   Slight improvement following insertion of rectal tube. Persisting generalized ileus pattern. Distal colonic obstruction remains a   differential consideration. XR ABDOMEN (KUB) (SINGLE AP VIEW)   Final Result   Probable worsening of diffuse gaseous distention of bowel, either ileus   versus very distal obstruction, favoring the former. Continued follow-up is   advised. XR ABDOMEN (2 VIEWS)   Final Result   Distension of the colon, cecal diameter 14 mm      The findings were sent to the Radiology Results [address] at 12:53   pm on 3/6/2020to be communicated to a licensed caregiver. XR ABDOMEN (KUB) (SINGLE AP VIEW)   Final Result   Unchanged colonic ileus over the past 18 hours         XR ABDOMEN (KUB) (SINGLE AP VIEW)   Final Result   Gaseous distention of the colon without obstruction, possibly an adynamic   ileus or [place]'s syndrome. CT ABDOMEN PELVIS WO CONTRAST Additional Contrast? None   Final Result   Marked distension of the ascending and transverse colon with mild gradual   decompression distally. Air-fluid levels are present throughout. 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 As the distal rectum/anus is outside the field of view, recommend rectal exam   to ensure there is no distal mass. Colonic diverticulosis. Small bilateral effusions, increased since 02/29/2020. XR CHEST PORTABLE   Final Result   New bilateral pleural effusions. CT CHEST WO CONTRAST   Final Result   Dependent airspace disease bilaterally, right greater than left. Associated   small effusion on the right is noted. on CKD stage III  - baseline Cr variable~  1.9 on 1/24/2020  - Cr on admission 2.1->2.3->2.1-> 1.2-> 1.1 now   - hold aldactone for now, resume lasix     #Leukocytosis  - 15.3->11.7-> 14   - appears chronically elevated  - check CXR    #PAF  - regular rate and rhythm on exam  - cont BB, Rythmol  - AC held 2/2 fall, CXR with effusion, poss contusion? - now restarted eliquis     #Chronic Diastolic CHF  - appears compensated  - low Na diet, I&Os, daily weights  - cont BB  - resume lasix and aldactone     #Type II DM  - previous hx, not on diabetic meds  - last Hgb A1c: 6.4 on 1/24/20  - controlled with diet, carb control    #HTN  - stable  - cont Hydralazine, Lopressor, Nifedipine  - resumed lasix and aldactone   #HLD  - cont Lipitor     #COPD  - no AE  - cont HHN    #Chronic Normocytic Anemia  - Hgb 10.3  - stable, monitor      #Seizure DO  - seizure precautions  - cont Lamictal    #Morbid Obesity  - Body mass index is 41.8 kg/m². - Complicating assessment and treatment. Placing patient at risk for multiple co-morbidities as well as early death and contributing to the patient's presentation.   - Counseled on weight loss.      #BETSY  - BiPAP QHS    DVT Prophylaxis: Eliquis  Diet: Dietary Nutrition Supplements: Clear Liquid Oral Supplement  DIET LOW FIBER;  Carb Control: 4 carb choices (60 gms)/meal; Daily Fluid Restriction: 1500 ml  Code Status: Full Code    DISPO: advance diet, cont PT    Severo Spotted PA-C  3/12/2020 10:57 AM        Agree with above  Changes made to note    Sulaiman Darling MD 3/12/2020 10:57 AM

## 2020-03-13 LAB
ALBUMIN SERPL-MCNC: 2.6 G/DL (ref 3.4–5)
ANION GAP SERPL CALCULATED.3IONS-SCNC: 14 MMOL/L (ref 3–16)
BASOPHILS ABSOLUTE: 0 K/UL (ref 0–0.2)
BASOPHILS RELATIVE PERCENT: 0.3 %
BUN BLDV-MCNC: 17 MG/DL (ref 7–20)
CALCIUM SERPL-MCNC: 8.9 MG/DL (ref 8.3–10.6)
CHLORIDE BLD-SCNC: 98 MMOL/L (ref 99–110)
CO2: 22 MMOL/L (ref 21–32)
CREAT SERPL-MCNC: 1.4 MG/DL (ref 0.6–1.2)
EOSINOPHILS ABSOLUTE: 0.2 K/UL (ref 0–0.6)
EOSINOPHILS RELATIVE PERCENT: 1.1 %
GFR AFRICAN AMERICAN: 44
GFR NON-AFRICAN AMERICAN: 36
GLUCOSE BLD-MCNC: 114 MG/DL (ref 70–99)
GLUCOSE BLD-MCNC: 119 MG/DL (ref 70–99)
GLUCOSE BLD-MCNC: 126 MG/DL (ref 70–99)
GLUCOSE BLD-MCNC: 127 MG/DL (ref 70–99)
GLUCOSE BLD-MCNC: 139 MG/DL (ref 70–99)
HCT VFR BLD CALC: 30.1 % (ref 36–48)
HEMOGLOBIN: 9.6 G/DL (ref 12–16)
LYMPHOCYTES ABSOLUTE: 1.2 K/UL (ref 1–5.1)
LYMPHOCYTES RELATIVE PERCENT: 8.1 %
MCH RBC QN AUTO: 27.6 PG (ref 26–34)
MCHC RBC AUTO-ENTMCNC: 31.8 G/DL (ref 31–36)
MCV RBC AUTO: 86.8 FL (ref 80–100)
MONOCYTES ABSOLUTE: 1.4 K/UL (ref 0–1.3)
MONOCYTES RELATIVE PERCENT: 9 %
NEUTROPHILS ABSOLUTE: 12.3 K/UL (ref 1.7–7.7)
NEUTROPHILS RELATIVE PERCENT: 81.5 %
PDW BLD-RTO: 14.1 % (ref 12.4–15.4)
PERFORMED ON: ABNORMAL
PHOSPHORUS: 4.5 MG/DL (ref 2.5–4.9)
PLATELET # BLD: 364 K/UL (ref 135–450)
PMV BLD AUTO: 8.1 FL (ref 5–10.5)
POTASSIUM SERPL-SCNC: 3.5 MMOL/L (ref 3.5–5.1)
RBC # BLD: 3.47 M/UL (ref 4–5.2)
SODIUM BLD-SCNC: 134 MMOL/L (ref 136–145)
WBC # BLD: 15.1 K/UL (ref 4–11)

## 2020-03-13 PROCEDURE — 94640 AIRWAY INHALATION TREATMENT: CPT

## 2020-03-13 PROCEDURE — 6370000000 HC RX 637 (ALT 250 FOR IP): Performed by: INTERNAL MEDICINE

## 2020-03-13 PROCEDURE — 36415 COLL VENOUS BLD VENIPUNCTURE: CPT

## 2020-03-13 PROCEDURE — 85025 COMPLETE CBC W/AUTO DIFF WBC: CPT

## 2020-03-13 PROCEDURE — 94669 MECHANICAL CHEST WALL OSCILL: CPT

## 2020-03-13 PROCEDURE — 94761 N-INVAS EAR/PLS OXIMETRY MLT: CPT

## 2020-03-13 PROCEDURE — 97530 THERAPEUTIC ACTIVITIES: CPT

## 2020-03-13 PROCEDURE — 99232 SBSQ HOSP IP/OBS MODERATE 35: CPT | Performed by: INTERNAL MEDICINE

## 2020-03-13 PROCEDURE — 2580000003 HC RX 258: Performed by: INTERNAL MEDICINE

## 2020-03-13 PROCEDURE — 2700000000 HC OXYGEN THERAPY PER DAY

## 2020-03-13 PROCEDURE — 80069 RENAL FUNCTION PANEL: CPT

## 2020-03-13 PROCEDURE — 1200000000 HC SEMI PRIVATE

## 2020-03-13 PROCEDURE — 6370000000 HC RX 637 (ALT 250 FOR IP): Performed by: PHYSICIAN ASSISTANT

## 2020-03-13 PROCEDURE — 97535 SELF CARE MNGMENT TRAINING: CPT

## 2020-03-13 RX ADMIN — CARBIDOPA AND LEVODOPA 1 TABLET: 25; 100 TABLET ORAL at 13:10

## 2020-03-13 RX ADMIN — IPRATROPIUM BROMIDE AND ALBUTEROL SULFATE 3 ML: 2.5; .5 SOLUTION RESPIRATORY (INHALATION) at 11:55

## 2020-03-13 RX ADMIN — HYDRALAZINE HYDROCHLORIDE 25 MG: 25 TABLET, FILM COATED ORAL at 08:59

## 2020-03-13 RX ADMIN — CARBIDOPA AND LEVODOPA 1 TABLET: 25; 100 TABLET ORAL at 22:11

## 2020-03-13 RX ADMIN — LINACLOTIDE 145 MCG: 145 CAPSULE, GELATIN COATED ORAL at 09:00

## 2020-03-13 RX ADMIN — IPRATROPIUM BROMIDE AND ALBUTEROL SULFATE 3 ML: 2.5; .5 SOLUTION RESPIRATORY (INHALATION) at 16:13

## 2020-03-13 RX ADMIN — PROPAFENONE HYDROCHLORIDE 150 MG: 150 TABLET, FILM COATED ORAL at 08:58

## 2020-03-13 RX ADMIN — NIFEDIPINE 60 MG: 30 TABLET, FILM COATED, EXTENDED RELEASE ORAL at 08:59

## 2020-03-13 RX ADMIN — SENNOSIDES AND DOCUSATE SODIUM 2 TABLET: 8.6; 5 TABLET ORAL at 08:59

## 2020-03-13 RX ADMIN — APIXABAN 2.5 MG: 5 TABLET, FILM COATED ORAL at 22:11

## 2020-03-13 RX ADMIN — ATORVASTATIN CALCIUM 20 MG: 10 TABLET, FILM COATED ORAL at 22:11

## 2020-03-13 RX ADMIN — CARBIDOPA AND LEVODOPA 1 TABLET: 25; 100 TABLET ORAL at 08:59

## 2020-03-13 RX ADMIN — SPIRONOLACTONE 25 MG: 25 TABLET ORAL at 08:59

## 2020-03-13 RX ADMIN — PROPAFENONE HYDROCHLORIDE 150 MG: 150 TABLET, FILM COATED ORAL at 22:10

## 2020-03-13 RX ADMIN — DULOXETINE HYDROCHLORIDE 60 MG: 60 CAPSULE, DELAYED RELEASE ORAL at 08:58

## 2020-03-13 RX ADMIN — Medication 2 PUFF: at 07:40

## 2020-03-13 RX ADMIN — LAMOTRIGINE 50 MG: 25 TABLET ORAL at 22:10

## 2020-03-13 RX ADMIN — HYDRALAZINE HYDROCHLORIDE 25 MG: 25 TABLET, FILM COATED ORAL at 22:10

## 2020-03-13 RX ADMIN — MONTELUKAST SODIUM 10 MG: 10 TABLET, COATED ORAL at 22:10

## 2020-03-13 RX ADMIN — BISACODYL 10 MG: 10 SUPPOSITORY RECTAL at 14:32

## 2020-03-13 RX ADMIN — Medication 2 PUFF: at 19:55

## 2020-03-13 RX ADMIN — Medication 10 ML: at 22:12

## 2020-03-13 RX ADMIN — FUROSEMIDE 40 MG: 40 TABLET ORAL at 08:59

## 2020-03-13 RX ADMIN — OXYCODONE HYDROCHLORIDE AND ACETAMINOPHEN 2 TABLET: 5; 325 TABLET ORAL at 13:10

## 2020-03-13 RX ADMIN — LAMOTRIGINE 50 MG: 25 TABLET ORAL at 08:59

## 2020-03-13 RX ADMIN — METOPROLOL TARTRATE 50 MG: 50 TABLET, FILM COATED ORAL at 22:10

## 2020-03-13 RX ADMIN — IPRATROPIUM BROMIDE AND ALBUTEROL SULFATE 3 ML: 2.5; .5 SOLUTION RESPIRATORY (INHALATION) at 19:55

## 2020-03-13 RX ADMIN — OXYCODONE HYDROCHLORIDE AND ACETAMINOPHEN 2 TABLET: 5; 325 TABLET ORAL at 08:59

## 2020-03-13 RX ADMIN — APIXABAN 2.5 MG: 5 TABLET, FILM COATED ORAL at 08:59

## 2020-03-13 RX ADMIN — OXYCODONE HYDROCHLORIDE AND ACETAMINOPHEN 2 TABLET: 5; 325 TABLET ORAL at 22:09

## 2020-03-13 RX ADMIN — IPRATROPIUM BROMIDE AND ALBUTEROL SULFATE 3 ML: 2.5; .5 SOLUTION RESPIRATORY (INHALATION) at 07:40

## 2020-03-13 RX ADMIN — METOPROLOL TARTRATE 50 MG: 50 TABLET, FILM COATED ORAL at 08:58

## 2020-03-13 RX ADMIN — PROPAFENONE HYDROCHLORIDE 150 MG: 150 TABLET, FILM COATED ORAL at 13:10

## 2020-03-13 ASSESSMENT — PAIN SCALES - GENERAL
PAINLEVEL_OUTOF10: 9
PAINLEVEL_OUTOF10: 5
PAINLEVEL_OUTOF10: 4
PAINLEVEL_OUTOF10: 7
PAINLEVEL_OUTOF10: 9

## 2020-03-13 NOTE — PROGRESS NOTES
Bedside report given and pt care transferred to [facility]. Pt denies needs at this time. Call light within reach.

## 2020-03-13 NOTE — PROGRESS NOTES
fatigue. Pt woudl benefit form continued OT to safely progress functional IND    GOALS- continue with goals   1). Bed to [facility] 13 and with use of RW     To be met in 5 Visits:  1). Supine to Sit: Supervision  2). Upper Body Bathing:  Min A  3). Lower Body Bathing:  Min A with appropriate AE  4). Upper Body Dressing: Min A  5). Lower Body Dressing: Min A with appropriate AE   6).  Pt to kimo UE exs x 15 reps     Plan: cont with POC    Sarika Ortiz OTR/L  Lic # [id number]        If patient discharges from this facility prior to next visit, this note will serve as the Discharge Summary

## 2020-03-13 NOTE — PROGRESS NOTES
Handoff report and transfer of care given at bedside to Elliot Acevedo. Patient in stable condition, denies needs/concerns at this time. Call light within reach.

## 2020-03-13 NOTE — PROGRESS NOTES
Shift assessment complete; see flow sheet. Scheduled medications administered; See MAR. O2 3 LPM nc in place. Pt denies pain at this time. Pt respirations equal but labored with exertion. Pt denies any needs at this time. Call light within reach, bed in low locked position, bed alarm on.

## 2020-03-13 NOTE — PROGRESS NOTES
PROGRESS NOTE  S:79 yrs Patient  admitted on 2/29/2020 with Intractable pain [R52]  Intractable pain [R52] . Today she complains of Abdominal Distension     Exam:   Vitals:    03/13/20 0845   BP: (!) 149/74   Pulse: 75   Resp: 18   Temp: 98.7 °F (37.1 °C)   SpO2: 96%      General appearance: alert, appears stated age, cooperative, morbidly obese and slowed mentation  HEENT: PERRLA  Neck: no adenopathy and supple, symmetrical, trachea midline  Lungs: wheezes bilaterally  Heart: regular rate and rhythm, S1, S2 normal, no murmur, click, rub or gallop  Abdomen: abnormal findings:  distended and obese  Extremities: extremities normal, atraumatic, no cyanosis or edema     Medications: Reviewed    Labs:  CBC:   Recent Labs     03/11/20  0528 03/12/20  0542 03/13/20  0635   WBC 11.4* 14.8* 15.1*   HGB 9.6* 9.3* 9.6*   HCT 31.9* 28.9* 30.1*   MCV 93.4 86.2 86.8    344 364     BMP:   Recent Labs     03/11/20  0528 03/12/20  0542 03/13/20  0635   * 133* 134*   K 4.9 3.5 3.5    98* 98*   CO2 16* 22 22   PHOS 3.5 3.2 4.5   BUN 13 13 17   CREATININE 1.0 1.1 1.4*     Attending Supervising [de-identified] Attestation Statement  The patient is a 70 y.o. female. I have performed a history and physical examination of the patient. I discussed the case with my physician assistant Dequan Coleman PA-C    I reviewed the patient's Past Medical History, Past Surgical History, Medications, and Allergies.      Physical Exam:  Vitals:    03/13/20 0743 03/13/20 0845 03/13/20 1158 03/13/20 1338   BP:  (!) 149/74  136/62   Pulse:  75  75   Resp:  18  18   Temp:  98.7 °F (37.1 °C)  97.9 °F (36.6 °C)   TempSrc:  Oral  Oral   SpO2: 90% 96% 96% 97%   Weight:       Height:           Physical Examination: General appearance - alert, well appearing, and in no distress  Mental status - alert, oriented to person, place, and time  Eyes - pupils equal and reactive, extraocular eye movements intact  Neck - supple, no significant adenopathy  Chest - clear to auscultation, no wheezes, rales or rhonchi, symmetric air entry  Heart - normal rate, regular rhythm, normal S1, S2, no murmurs, rubs, clicks or gallops  Abdomen - distended  Extremities - no pedal edema noted          Impression: 65-year-old female with a history of anemia, asthma, atrial fibrillation, CKD, diabetes, diverticulosis, GERD, HTN, HLD, and hypothyroidism admitted with a fall complicated by colon pseudobstriction. Recommendation:  1. Aggressive PT/OT  2. Up in chair TID  3. Toilet BID  4. Minimize narcotics  5. Low fiber diet  6. Continue MiraLax BID  7. [facility] to d/c from GI standpoint to rehab facility      Niraj Alas MD  11:38 AM 3/13/2020                      65-year-old female with a history of anemia, asthma, atrial fibrillation, CKD, diabetes, diverticulosis, GERD, HTN, HLD, and hypothyroidism admitted with a fall complicated by colon pseudobstriction. Aggressive PT/OT. miralax BID. Low fiber diet.  D/C to rehab    Zachary Pimentel MD          ([address] [place] [address]) [phone]  Adin Baker

## 2020-03-13 NOTE — PLAN OF CARE
Problem: Falls - Risk of:  Goal: Will remain free from falls  Description: Will remain free from falls  Outcome: Ongoing  Goal: Absence of physical injury  Description: Absence of physical injury  Outcome: Ongoing     Problem: Risk for Impaired Skin Integrity  Goal: Tissue integrity - skin and mucous membranes  Description: Structural intactness and normal physiological function of skin and  mucous membranes. Outcome: Ongoing     Problem: Pain:  Description: Pain management should include both nonpharmacologic and pharmacologic interventions.   Goal: Pain level will decrease  Description: Pain level will decrease  Outcome: Ongoing  Goal: Control of acute pain  Description: Control of acute pain  Outcome: Ongoing  Goal: Control of chronic pain  Description: Control of chronic pain  Outcome: Ongoing     Problem: Nutrition  Goal: Optimal nutrition therapy  3/13/2020 0507 by Rona Whitaker RN  Outcome: Ongoing  3/12/2020 1810 by Cliff Shah RD, LD  Outcome: Ongoing  Goal: Understanding of nutritional guidelines  Outcome: Ongoing     Problem: ACTIVITY INTOLERANCE/IMPAIRED MOBILITY  Goal: Mobility/activity is maintained at optimum level for patient  Outcome: Ongoing

## 2020-03-13 NOTE — PROGRESS NOTES
 CERVICAL FUSION  11/2010    CHOLECYSTECTOMY      COLONOSCOPY N/A 3/8/2020    COLONOSCOPY FLEXIBLE W/ DECOMPRESSION performed by Serjio Fritz [facility] at [facility]      JOINT REPLACEMENT Bilateral 2003    KNEE SURGERY      replacement x 2    SHOULDER SURGERY      TONSILLECTOMY      TUBAL LIGATION      VITRECTOMY         Level of Consciousness: Alert, Oriented, and Cooperative = 0    Level of Activity: Bedridden, unresponsive or quadriplegic = 4    Respiratory Pattern: Dyspnea with exertion;Irregular pattern;or RR less than 6 = 2    Breath Sounds: Diminshed bilaterally and/or crackles = 2    Sputum  Sputum Color: Frothy, Tenacity: Frothy, Sputum How Obtained: Spontaneous cough  Cough: Strong, spontaneous, non-productive = 0    Vital Signs   BP (!) 144/69   Pulse 75   Temp 98 °F (36.7 °C) (Oral)   Resp 18   Ht 5' 6\" (1.676 m)   Wt 283 lb 2 oz (128.4 kg)   SpO2 95%   BMI 45.70 kg/m²   SPO2 (COPD values may differ): 88-89% on room air or greater than 92% on FiO2 28- 35% = 2    Peak Flow (asthma only): not applicable = 0    RSI: 28-44 = Q6H or QID and Q4HPRN for dyspnea        Plan       Goals: medication delivery    Patient/caregiver was educated on the proper method of use for Respiratory Care Devices:  Yes      Level of patient/caregiver understanding able to:   ? Verbalize understanding   ? Demonstrate understanding       ? Teach back        ? Needs reinforcement       ? No available caregiver               ? Other:     Response to education:  Good     Is patient being placed on Home Treatment Regimen? No     Does the patient have everything they need prior to discharge? NA     Comments: Chart reviewed and patient assessed. Plan of Care: Albuterol 2PUFFS Q6PRN,  Duoneb QID,  Flovent 110  2puffs BID.      Electronically signed by Ariadna Tavera RCP on 3/13/2020 at 1:44 AM    Respiratory Protocol Guidelines 1. Assessment and treatment by Respiratory Therapy will be initiated for medication and therapeutic interventions upon initiation of aerosolized medication. 2. Physician will be contacted for respiratory rate (RR) greater than 35 breaths per minute. Therapy will be held for heart rate (HR) greater than 140 beats per minute, pending direction from physician. 3. Bronchodilators will be administered via Metered Dose Inhaler (MDI) with spacer when the following criteria are met:  a. Alert and cooperative     b. HR < 140 bpm  c. RR < 30 bpm                d. Can demonstrate a 2-3 second inspiratory hold  4. Bronchodilators will be administered via Hand Held Nebulizer [facility]) to patients when ANY of the following criteria are met  a. Incognizant or uncooperative          b. Patients treated with HHN at Home        c. Unable to demonstrate proper use of MDI with spacer     d. RR > 30 bpm   5. Bronchodilators will be delivered via Metered Dose Inhaler (MDI), HHN, Aerogen to intubated patients on mechanical ventilation. 6. Inhalation medication orders will be delivered and/or substituted as outlined below. Aerosolized Medications Ordering and Administration Guidelines:    1. All Medications will be ordered by a physician, and their frequency and/or modality will be adjusted as defined by the patients Respiratory Severity Index (RSI) score. 2. If the patient does not have documented COPD, consider discontinuing anticholinergics when RSI is less than 9.  3. If the bronchospasm worsens (increased RSI), then the bronchodilator frequency can be increased to a maximum of every 4 hours. If greater than every 4 hours is required, the physician will be contacted. 4. If the bronchospasm improves, the frequency of the bronchodilator can be decreased, based on the patient's RSI, but not less than home treatment regimen frequency.   5. Bronchodilator(s) will be discontinued if patient has a RSI less than 9 and has received no

## 2020-03-13 NOTE — CARE COORDINATION
INTERDISCIPLINARY PLAN OF CARE CONFERENCE    Date/Time: 3/13/2020 4:22 PM  Completed by: Megan Lindsey Case Management      Patient Name:  Rocio Jones  YOB: 1940  Admitting Diagnosis: Intractable pain [R52]  Intractable pain [R52]     Admit Date/Time:  2/29/2020  4:03 PM    Chart reviewed. Interdisciplinary team met to discuss patient progress and discharge plans. Disciplines included Case Management, Nursing, and Dietitian. Current Status: IP 03/01/2020    PT/OT recommendation: STR    Anticipated Discharge Date: TBD  Expected D/C Disposition:  Skilled nursing facility  Confirmed plan w/patient: Yes  Discharge Plan Comments: The Plan for Transition of Care is related to the following treatment goals: STR at [facility]. +bed available when pt ready for DC. +CM following. The Patient  was provided with a choice of provider and agrees   with the discharge plan.  [x] Yes [] No    Home O2 in place on admit: No  Pt informed of need to bring portable home O2 tank on day of discharge for nursing to connect prior to leaving:  No  Verbalized agreement/Understanding:  No

## 2020-03-13 NOTE — PROGRESS NOTES
distended today   Worked with PT    General:elderly female  Awake, alert and oriented. Appears to be not in any distress  Mucous Membranes:  Pink , anicteric  Neck: No JVD, no carotid bruit, no thyromegaly  Chest:  Clear to auscultation bilaterally, no added sounds  Right Lower rib cage pain   Cardiovascular:  RRR S1S2 heard, no murmurs or gallops  Abdomen:  Soft, +++distended, non tender, no organomegaly, BS sluggish  Extremities: No edema or cyanosis.  Distal pulses well felt  Left UE subcut edema resolving  Neurological : grossly normal    Scheduled Meds:   spironolactone  25 mg Oral Daily    sennosides-docusate sodium  2 tablet Oral Daily    furosemide  40 mg Oral Daily    linaclotide  145 mcg Oral QAM AC    bisacodyl  10 mg Rectal Daily    apixaban  2.5 mg Oral BID    DULoxetine  60 mg Oral Daily    atorvastatin  20 mg Oral Nightly    carbidopa-levodopa  1 tablet Oral TID    hydrALAZINE  25 mg Oral BID    lamoTRIgine  50 mg Oral BID    metoprolol tartrate  50 mg Oral BID    montelukast  10 mg Oral Nightly    NIFEdipine  60 mg Oral Daily    propafenone  150 mg Oral 3 times per day    sodium chloride flush  10 mL Intravenous 2 times per day    lidocaine  1 patch Transdermal Daily    ipratropium-albuterol  1 vial Inhalation 4x daily    fluticasone  2 puff Inhalation BID       Continuous Infusions:   dextrose         PRN Meds:  oxyCODONE-acetaminophen **OR** oxyCODONE-acetaminophen, glucose, dextrose, glucagon (rDNA), dextrose, acetaminophen **OR** acetaminophen, albuterol sulfate HFA, sodium chloride flush, promethazine **OR** ondansetron      Data:  CBC:   Recent Labs     03/11/20 0528 03/12/20  0542 03/13/20  0635   WBC 11.4* 14.8* 15.1*   HGB 9.6* 9.3* 9.6*   HCT 31.9* 28.9* 30.1*   MCV 93.4 86.2 86.8    344 364     BMP:   Recent Labs     03/11/20 0528 03/12/20  0542 03/13/20  0635   * 133* 134*   K 4.9 3.5 3.5    98* 98*   CO2 16* 22 22   PHOS 3.5 3.2 4.5   BUN 13 13 17 CREATININE 1.0 1.1 1.4*     LIVER PROFILE:   No results for input(s): AST, ALT, LIPASE, BILIDIR, BILITOT, ALKPHOS in the last 72 hours. Invalid input(s): AMYLASE,  ALB     RADIOLOGY  XR ABDOMEN (KUB) (SINGLE AP VIEW)   Final Result   Stable intestinal distension status post rectal tube removal         XR CHEST PORTABLE   Final Result   Cardiomegaly with interval decrease in size of bilateral pleural effusions         XR ABDOMEN (KUB) (SINGLE AP VIEW)   Final Result   1. Colonic tube remains in place. 2. Diffuse colonic distension, slightly improved since 3/9/2020 as described   above. 3. No obvious free air or pneumatosis on supine view. XR ABDOMEN (KUB) (SINGLE AP VIEW)   Final Result   Slight improvement following insertion of rectal tube. Persisting generalized ileus pattern. Distal colonic obstruction remains a   differential consideration. XR ABDOMEN (KUB) (SINGLE AP VIEW)   Final Result   Probable worsening of diffuse gaseous distention of bowel, either ileus   versus very distal obstruction, favoring the former. Continued follow-up is   advised. XR ABDOMEN (2 VIEWS)   Final Result   Distension of the colon, cecal diameter 14 mm      The findings were sent to the Radiology Results [address] at 12:53   pm on 3/6/2020to be communicated to a licensed caregiver. XR ABDOMEN (KUB) (SINGLE AP VIEW)   Final Result   Unchanged colonic ileus over the past 18 hours         XR ABDOMEN (KUB) (SINGLE AP VIEW)   Final Result   Gaseous distention of the colon without obstruction, possibly an adynamic   ileus or Inocencia's syndrome. CT ABDOMEN PELVIS WO CONTRAST Additional Contrast? None   Final Result   Marked distension of the ascending and transverse colon with mild gradual   decompression distally. Air-fluid levels are present throughout. 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    As the distal rectum/anus Syndrome. -CT a/p as above-  Distension of ascending and transverse colon  -GI consulted. Status post rectal decompression tube placement on 3/8/2020  - linzess added   -3/10: rectal tube remains in place but abdomen is becoming more distended. Checked KUB - improved  3/11: Rectal tube removed. Start full liquids and advance to low fiber as tolerated  3/12: distention persistent- checked AXR - unchanged  3/13: distention persistent , add suppository today     #Hyperkalemia- resolved   - 5.3-> 4.8-> 3.5  - held aldactone- resumed 3/12 as her lasix has been resumed and K no longer elevated   - daily BMP     #Mild FARHEEN on CKD stage III  - baseline Cr variable~  1.9 on 1/24/2020  - Cr on admission 2.1->2.3->2.1-> 1.2-> 1.1 -> 1.4 now   - resumed aldactone and lasix     #Leukocytosis  - 15.3->11.7-> 14 -> 15  - appears chronically elevated  - checked CXR repeat 3/12- no new infiltrate     #PAF  - regular rate and rhythm on exam  - cont BB, Rythmol  - AC held initially 2/2 fall, CXR with effusion, poss contusion? - now restarted eliquis     #Chronic Diastolic CHF  - appears compensated  - low Na diet, I&Os, daily weights  - cont BB  - resumed lasix and aldactone     #Type II DM  - previous hx, not on diabetic meds  - last Hgb A1c: 6.4 on 1/24/20  - controlled with diet, carb control    #HTN  - stable  - cont Hydralazine, Lopressor, Nifedipine  - resumed lasix and aldactone     #HLD  - cont Lipitor     #COPD  - no AE  - cont HHN    #Chronic Normocytic Anemia  - Hgb 10.3  - stable, monitor      #Seizure DO  - seizure precautions  - cont Lamictal    #Morbid Obesity  - Body mass index is 41.8 kg/m². - Complicating assessment and treatment.  Placing patient at risk for multiple co-morbidities as well as early death and contributing to the patient's presentation.   - Counseled on weight loss.      #BETSY  - BiPAP QHS    DVT Prophylaxis: Eliquis  Diet: DIET LOW FIBER; Daily Fluid Restriction: 1500 ml  Dietary Nutrition

## 2020-03-14 LAB
ALBUMIN SERPL-MCNC: 2.5 G/DL (ref 3.4–5)
ANION GAP SERPL CALCULATED.3IONS-SCNC: 13 MMOL/L (ref 3–16)
BASOPHILS ABSOLUTE: 0.1 K/UL (ref 0–0.2)
BASOPHILS RELATIVE PERCENT: 0.7 %
BUN BLDV-MCNC: 22 MG/DL (ref 7–20)
CALCIUM SERPL-MCNC: 8.7 MG/DL (ref 8.3–10.6)
CHLORIDE BLD-SCNC: 102 MMOL/L (ref 99–110)
CO2: 21 MMOL/L (ref 21–32)
CREAT SERPL-MCNC: 1.7 MG/DL (ref 0.6–1.2)
EOSINOPHILS ABSOLUTE: 0.1 K/UL (ref 0–0.6)
EOSINOPHILS RELATIVE PERCENT: 1 %
GFR AFRICAN AMERICAN: 35
GFR NON-AFRICAN AMERICAN: 29
GLUCOSE BLD-MCNC: 130 MG/DL (ref 70–99)
HCT VFR BLD CALC: 31.4 % (ref 36–48)
HEMOGLOBIN: 9.7 G/DL (ref 12–16)
LYMPHOCYTES ABSOLUTE: 1.1 K/UL (ref 1–5.1)
LYMPHOCYTES RELATIVE PERCENT: 9.7 %
MCH RBC QN AUTO: 26.9 PG (ref 26–34)
MCHC RBC AUTO-ENTMCNC: 30.9 G/DL (ref 31–36)
MCV RBC AUTO: 87.2 FL (ref 80–100)
MONOCYTES ABSOLUTE: 1.3 K/UL (ref 0–1.3)
MONOCYTES RELATIVE PERCENT: 11.7 %
NEUTROPHILS ABSOLUTE: 8.8 K/UL (ref 1.7–7.7)
NEUTROPHILS RELATIVE PERCENT: 76.9 %
PDW BLD-RTO: 14.7 % (ref 12.4–15.4)
PHOSPHORUS: 4.3 MG/DL (ref 2.5–4.9)
PLATELET # BLD: 373 K/UL (ref 135–450)
PMV BLD AUTO: 8.6 FL (ref 5–10.5)
POTASSIUM SERPL-SCNC: 3.9 MMOL/L (ref 3.5–5.1)
RBC # BLD: 3.6 M/UL (ref 4–5.2)
SODIUM BLD-SCNC: 136 MMOL/L (ref 136–145)
WBC # BLD: 11.4 K/UL (ref 4–11)

## 2020-03-14 PROCEDURE — 94669 MECHANICAL CHEST WALL OSCILL: CPT

## 2020-03-14 PROCEDURE — 2580000003 HC RX 258: Performed by: INTERNAL MEDICINE

## 2020-03-14 PROCEDURE — 2700000000 HC OXYGEN THERAPY PER DAY

## 2020-03-14 PROCEDURE — 6370000000 HC RX 637 (ALT 250 FOR IP): Performed by: INTERNAL MEDICINE

## 2020-03-14 PROCEDURE — 99232 SBSQ HOSP IP/OBS MODERATE 35: CPT | Performed by: INTERNAL MEDICINE

## 2020-03-14 PROCEDURE — 85025 COMPLETE CBC W/AUTO DIFF WBC: CPT

## 2020-03-14 PROCEDURE — 6370000000 HC RX 637 (ALT 250 FOR IP): Performed by: PHYSICIAN ASSISTANT

## 2020-03-14 PROCEDURE — 97530 THERAPEUTIC ACTIVITIES: CPT

## 2020-03-14 PROCEDURE — 94761 N-INVAS EAR/PLS OXIMETRY MLT: CPT

## 2020-03-14 PROCEDURE — 80069 RENAL FUNCTION PANEL: CPT

## 2020-03-14 PROCEDURE — 36415 COLL VENOUS BLD VENIPUNCTURE: CPT

## 2020-03-14 PROCEDURE — 1200000000 HC SEMI PRIVATE

## 2020-03-14 PROCEDURE — 97535 SELF CARE MNGMENT TRAINING: CPT

## 2020-03-14 PROCEDURE — 94640 AIRWAY INHALATION TREATMENT: CPT

## 2020-03-14 RX ORDER — SODIUM CHLORIDE 9 MG/ML
INJECTION, SOLUTION INTRAVENOUS CONTINUOUS
Status: DISPENSED | OUTPATIENT
Start: 2020-03-14 | End: 2020-03-14

## 2020-03-14 RX ADMIN — BISACODYL 10 MG: 10 SUPPOSITORY RECTAL at 08:49

## 2020-03-14 RX ADMIN — CARBIDOPA AND LEVODOPA 1 TABLET: 25; 100 TABLET ORAL at 15:55

## 2020-03-14 RX ADMIN — HYDRALAZINE HYDROCHLORIDE 25 MG: 25 TABLET, FILM COATED ORAL at 20:32

## 2020-03-14 RX ADMIN — SODIUM CHLORIDE: 900 INJECTION, SOLUTION INTRAVENOUS at 20:36

## 2020-03-14 RX ADMIN — OXYCODONE HYDROCHLORIDE AND ACETAMINOPHEN 1 TABLET: 5; 325 TABLET ORAL at 14:32

## 2020-03-14 RX ADMIN — METOPROLOL TARTRATE 50 MG: 50 TABLET, FILM COATED ORAL at 08:49

## 2020-03-14 RX ADMIN — LINACLOTIDE 145 MCG: 145 CAPSULE, GELATIN COATED ORAL at 05:41

## 2020-03-14 RX ADMIN — LAMOTRIGINE 50 MG: 25 TABLET ORAL at 20:33

## 2020-03-14 RX ADMIN — OXYCODONE HYDROCHLORIDE AND ACETAMINOPHEN 2 TABLET: 5; 325 TABLET ORAL at 05:41

## 2020-03-14 RX ADMIN — HYDRALAZINE HYDROCHLORIDE 25 MG: 25 TABLET, FILM COATED ORAL at 08:49

## 2020-03-14 RX ADMIN — IPRATROPIUM BROMIDE AND ALBUTEROL SULFATE 3 ML: 2.5; .5 SOLUTION RESPIRATORY (INHALATION) at 11:16

## 2020-03-14 RX ADMIN — SENNOSIDES AND DOCUSATE SODIUM 2 TABLET: 8.6; 5 TABLET ORAL at 08:49

## 2020-03-14 RX ADMIN — CARBIDOPA AND LEVODOPA 1 TABLET: 25; 100 TABLET ORAL at 08:49

## 2020-03-14 RX ADMIN — PROPAFENONE HYDROCHLORIDE 150 MG: 150 TABLET, FILM COATED ORAL at 14:32

## 2020-03-14 RX ADMIN — PROPAFENONE HYDROCHLORIDE 150 MG: 150 TABLET, FILM COATED ORAL at 05:41

## 2020-03-14 RX ADMIN — LAMOTRIGINE 50 MG: 25 TABLET ORAL at 08:49

## 2020-03-14 RX ADMIN — OXYCODONE HYDROCHLORIDE AND ACETAMINOPHEN 2 TABLET: 5; 325 TABLET ORAL at 20:33

## 2020-03-14 RX ADMIN — MONTELUKAST SODIUM 10 MG: 10 TABLET, COATED ORAL at 20:33

## 2020-03-14 RX ADMIN — DULOXETINE HYDROCHLORIDE 60 MG: 60 CAPSULE, DELAYED RELEASE ORAL at 08:49

## 2020-03-14 RX ADMIN — Medication 2 PUFF: at 07:32

## 2020-03-14 RX ADMIN — Medication 2 PUFF: at 20:29

## 2020-03-14 RX ADMIN — IPRATROPIUM BROMIDE AND ALBUTEROL SULFATE 3 ML: 2.5; .5 SOLUTION RESPIRATORY (INHALATION) at 07:32

## 2020-03-14 RX ADMIN — CARBIDOPA AND LEVODOPA 1 TABLET: 25; 100 TABLET ORAL at 18:43

## 2020-03-14 RX ADMIN — Medication 10 ML: at 20:35

## 2020-03-14 RX ADMIN — APIXABAN 2.5 MG: 5 TABLET, FILM COATED ORAL at 20:33

## 2020-03-14 RX ADMIN — CARBIDOPA AND LEVODOPA 1 TABLET: 25; 100 TABLET ORAL at 20:33

## 2020-03-14 RX ADMIN — SPIRONOLACTONE 25 MG: 25 TABLET ORAL at 08:49

## 2020-03-14 RX ADMIN — METOPROLOL TARTRATE 50 MG: 50 TABLET, FILM COATED ORAL at 20:33

## 2020-03-14 RX ADMIN — IPRATROPIUM BROMIDE AND ALBUTEROL SULFATE 3 ML: 2.5; .5 SOLUTION RESPIRATORY (INHALATION) at 15:47

## 2020-03-14 RX ADMIN — ATORVASTATIN CALCIUM 20 MG: 10 TABLET, FILM COATED ORAL at 20:32

## 2020-03-14 RX ADMIN — APIXABAN 2.5 MG: 5 TABLET, FILM COATED ORAL at 08:49

## 2020-03-14 RX ADMIN — IPRATROPIUM BROMIDE AND ALBUTEROL SULFATE 3 ML: 2.5; .5 SOLUTION RESPIRATORY (INHALATION) at 20:24

## 2020-03-14 RX ADMIN — PROPAFENONE HYDROCHLORIDE 150 MG: 150 TABLET, FILM COATED ORAL at 20:32

## 2020-03-14 RX ADMIN — NIFEDIPINE 60 MG: 30 TABLET, FILM COATED, EXTENDED RELEASE ORAL at 08:49

## 2020-03-14 ASSESSMENT — PAIN DESCRIPTION - DESCRIPTORS: DESCRIPTORS: OTHER (COMMENT)

## 2020-03-14 ASSESSMENT — PAIN SCALES - GENERAL
PAINLEVEL_OUTOF10: 7
PAINLEVEL_OUTOF10: 7
PAINLEVEL_OUTOF10: 5
PAINLEVEL_OUTOF10: 8
PAINLEVEL_OUTOF10: 0

## 2020-03-14 ASSESSMENT — PAIN - FUNCTIONAL ASSESSMENT: PAIN_FUNCTIONAL_ASSESSMENT: ACTIVITIES ARE NOT PREVENTED

## 2020-03-14 ASSESSMENT — PAIN DESCRIPTION - PROGRESSION: CLINICAL_PROGRESSION: NOT CHANGED

## 2020-03-14 ASSESSMENT — PAIN DESCRIPTION - LOCATION: LOCATION: RIB CAGE

## 2020-03-14 ASSESSMENT — PAIN DESCRIPTION - ORIENTATION: ORIENTATION: RIGHT

## 2020-03-14 ASSESSMENT — PAIN DESCRIPTION - PAIN TYPE: TYPE: ACUTE PAIN

## 2020-03-14 ASSESSMENT — PAIN DESCRIPTION - ONSET: ONSET: ON-GOING

## 2020-03-14 ASSESSMENT — PAIN DESCRIPTION - FREQUENCY: FREQUENCY: INTERMITTENT

## 2020-03-14 NOTE — PROGRESS NOTES
Occupational Therapy Daily Treatment Note    Unit: [facility]  Date:  3/14/2020  Patient Name:    Neda Jay  Admitting diagnosis:  Intractable pain [R52]  Intractable pain [R52]  Admit Date:  2/29/2020  Precautions/Restrictions:  fall risk, IV, bed/chair alarm, supplemental O2 (3 L) and purewick catheter      Discharge Recommendations: SNF  DME needs for discharge: defer to facility       Therapy recommendations for staff:   Assist of 2 (moderate assist) with use of STEDY for all transfers to/from BSC/chair    AM-PAC Score: 12  Home Health S4 Level: NA       Treatment Time:  7910-6221  Treatment number:  6  Total Treatment Time:   55 minutes      Subjective:  Pt agreeable to work with therapy upon therapist arrival. Pt reports she enjoys looking out the window here in the AM because she has a beautiful sunrise view. Pain   Yes  Rating: moderate  Location: all over  Pain Medicine Status: RN notified      Bed Mobility:   Supine to Sit:  Not Tested  Sit to Supine: Mod A  Rolling: Mod A  Scooting: Max A of 2 to [facility]     Transfer Training:   Sit to stand: Mod A of 2, 2 attempts, from chair to RW; then to stedy. Unable to come to complete stand to RW  Stand to sit:     Mod A of 2  Bed < Chair: Total A via Stedy     Activity Tolerance   Pt completed therapy session with fatigue     ADL Training:   Upper body dressing:  Not Tested  Upper body bathing:  Not Tested  Lower body dressing:  Not Tested  Lower body bathing: Total A   Toileting: Total A   Grooming/Hygiene:  Not Tested    Therapeutic Exercise:   N/A    Patient Education:   Role of OT  Recommendations for DC    Positioning Needs: In bed, call light and needs in reach. Family Present:  No    Assessment: Pt significantly limited by pain and fatigue. Pt with great motivation toward improvement. Pt benefits from assistance to stand and complete all ADLs at this time.  Pt may benefit from continued skilled occupational therapy while in the hospital and upon d/c in order to progress to safe and more indpt functioning. GOALS  1). Bed to [facility] [address] and with use of RW     To be met in 5 Visits:  1). Supine to Sit: Supervision  2). Upper Body Bathing:  Min A  3). Lower Body Bathing:  Min A with appropriate AE  4). Upper Body Dressing: Min A  5). Lower Body Dressing: Min A with appropriate AE   6).  Pt to kimo UE exs x 15 reps      Plan: cont with POC      BRUNO Johnson, OTR/L   [id number]       If patient discharges from this facility prior to next visit, this note will serve as the Discharge Summary

## 2020-03-14 NOTE — PROGRESS NOTES
Progress Note    Admit Date:  2/29/2020    Subjective:  Ms. Thompson Pain looks improved. Abd remains distended   - had 3 BMs documented yesterday - she reports all liquid   - remains on supplemental O2 throughout the day   - Only 200 CC of UOP documented yesterday- unclear accuracy of this recording. Hold diuretics today and start IV NS     Objective:   Patient Vitals for the past 4 hrs:   BP Temp Temp src Pulse Resp SpO2   03/14/20 0830 (!) 157/67 97.2 °F (36.2 °C) Oral 77 19 98 %   03/14/20 0735 -- -- -- -- -- 99 %        Intake/Output Summary (Last 24 hours) at 3/14/2020 0936  Last data filed at 3/14/2020 0233  Gross per 24 hour   Intake 420 ml   Output 200 ml   Net 220 ml     Physical Exam:    Gen: No distress. Alert. Morbidly obese  female, resting in bed, spouse at bedside  Head: no obvious abrasions/lacerations noted  Eyes:  No sclera icterus. No conjunctival injection. Scarring of the left eye noted  Neck: Trachea midline. Resp: No accessory muscle use. No crackles. No wheezes. + Left rhonchi. CV: Regular rate. Regular rhythm. No murmur. No rub. No edema   GI: ++distended, not tender, BS diminished and high pitched   Skin: Warm and dry. No nodule on exposed extremities. No rash on exposed extremities. Small area of ecchymosis in right posterolateral chest wall, lidocaine patch in place   M/S: No cyanosis. No joint deformity. No clubbing. TTP along right posterior/lateral ribs  Neuro: Awake. Grossly nonfocal    Psych: No anxiety or agitation. I Austin Del Cid have reviewed the chart on Pasquale Be and personally interviewed and examined patient, reviewed the data (labs and imaging) and after discussion with my PA formulated the plan. Agree with note with the following edits. HPI:     I reviewed the patient's Past Medical History, Past Surgical History, Medications, and Allergies.      Had BM x 3 yesterday, abd distention still present but better    General:elderly female  Awake, alert and oriented. Appears to be not in any distress  Mucous Membranes:  Pink , anicteric  Neck: No JVD, no carotid bruit, no thyromegaly  Chest:  Clear to auscultation bilaterally, no added sounds  Right Lower rib cage pain   Cardiovascular:  RRR S1S2 heard, no murmurs or gallops  Abdomen:  Soft, slightly improved distention, non tender, no organomegaly, BS ++  Extremities: No edema or cyanosis.  Distal pulses well felt  Left UE subcut edema resolving  Neurological : grossly normal    Scheduled Meds:   spironolactone  25 mg Oral Daily    sennosides-docusate sodium  2 tablet Oral Daily    linaclotide  145 mcg Oral QAM AC    bisacodyl  10 mg Rectal Daily    apixaban  2.5 mg Oral BID    DULoxetine  60 mg Oral Daily    atorvastatin  20 mg Oral Nightly    carbidopa-levodopa  1 tablet Oral TID    hydrALAZINE  25 mg Oral BID    lamoTRIgine  50 mg Oral BID    metoprolol tartrate  50 mg Oral BID    montelukast  10 mg Oral Nightly    NIFEdipine  60 mg Oral Daily    propafenone  150 mg Oral 3 times per day    sodium chloride flush  10 mL Intravenous 2 times per day    lidocaine  1 patch Transdermal Daily    ipratropium-albuterol  1 vial Inhalation 4x daily    fluticasone  2 puff Inhalation BID       Continuous Infusions:   dextrose         PRN Meds:  oxyCODONE-acetaminophen **OR** oxyCODONE-acetaminophen, glucose, dextrose, glucagon (rDNA), dextrose, acetaminophen **OR** acetaminophen, albuterol sulfate HFA, sodium chloride flush, promethazine **OR** ondansetron      Data:  CBC:   Recent Labs     03/12/20  0542 03/13/20  0635   WBC 14.8* 15.1*   HGB 9.3* 9.6*   HCT 28.9* 30.1*   MCV 86.2 86.8    364     BMP:   Recent Labs     03/12/20  0542 03/13/20  0635 03/14/20  0600   * 134* 136   K 3.5 3.5 3.9   CL 98* 98* 102   CO2 22 22 21   PHOS 3.2 4.5 4.3   BUN 13 17 22*   CREATININE 1.1 1.4* 1.7*     LIVER PROFILE:   No results for input(s): AST, ALT, LIPASE, BILIDIR, BILITOT, ALKPHOS in the last 72 hours.    Invalid input(s): AMYLASE,  ALB    RADIOLOGY  XR ABDOMEN (KUB) (SINGLE AP VIEW)   Final Result   Stable intestinal distension status post rectal tube removal         XR CHEST PORTABLE   Final Result   Cardiomegaly with interval decrease in size of bilateral pleural effusions         XR ABDOMEN (KUB) (SINGLE AP VIEW)   Final Result   1. Colonic tube remains in place. 2. Diffuse colonic distension, slightly improved since 3/9/2020 as described   above. 3. No obvious free air or pneumatosis on supine view. XR ABDOMEN (KUB) (SINGLE AP VIEW)   Final Result   Slight improvement following insertion of rectal tube. Persisting generalized ileus pattern. Distal colonic obstruction remains a   differential consideration. XR ABDOMEN (KUB) (SINGLE AP VIEW)   Final Result   Probable worsening of diffuse gaseous distention of bowel, either ileus   versus very distal obstruction, favoring the former. Continued follow-up is   advised. XR ABDOMEN (2 VIEWS)   Final Result   Distension of the colon, cecal diameter 14 mm      The findings were sent to the Radiology Results [address] at 12:53   pm on 3/6/2020to be communicated to a licensed caregiver. XR ABDOMEN (KUB) (SINGLE AP VIEW)   Final Result   Unchanged colonic ileus over the past 18 hours         XR ABDOMEN (KUB) (SINGLE AP VIEW)   Final Result   Gaseous distention of the colon without obstruction, possibly an adynamic   ileus or Inocencia's syndrome. CT ABDOMEN PELVIS WO CONTRAST Additional Contrast? None   Final Result   Marked distension of the ascending and transverse colon with mild gradual   decompression distally. Air-fluid levels are present throughout. 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 As the distal rectum/anus is outside the field of view, recommend rectal exam   to ensure there is no distal mass. Colonic diverticulosis.       Small 3/8/2020  - linzess added   -3/10: rectal tube remains in place but abdomen is becoming more distended. Checked KUB - improved  3/11: Rectal tube removed. Start full liquids and advance to low fiber as tolerated  3/12: distention persistent- checked AXR - unchanged  3/13: distention persistent, added suppository   3/14: had 3 BMs yesterday, distention persistent. Cont suppositories and ambulation, tolerating diet     #Hyperkalemia- resolved   - 5.3-> 4.8-> 3.9  - held aldactone initially   - daily BMP     #Mild FARHEEN on CKD stage III  - baseline Cr variable~  1.9 on 1/24/2020  - Cr on admission 2.1->2.3->2.1-> 1.2-> 1.1 -> 1.7 now   - resumed aldactone and lasix, will hold again starting 3/14 and start IV NS     #Leukocytosis  - 15.3->11.7-> 14 -> 15-> 11.4   - appears chronically elevated  - checked CXR repeat 3/12- no new infiltrate     #PAF  - regular rate and rhythm on exam  - cont BB, Rythmol, eliquis      #Chronic Diastolic CHF  - appears compensated  - low Na diet, I&Os, daily weights  - cont BB  - resumed lasix and aldactone, now on hold again 3/14     #Type II DM  - previous hx, not on diabetic meds  - last Hgb A1c: 6.4 on 1/24/20  - controlled with diet, carb control    #HTN  - stable  - cont Hydralazine, Lopressor, Nifedipine    #HLD  - cont Lipitor     #COPD  - no AE  - cont HHN    #Chronic Normocytic Anemia  - Hgb 10.3  - stable, monitor      #Seizure DO  - seizure precautions  - cont Lamictal    #Morbid Obesity  - Body mass index is 41.8 kg/m². - Complicating assessment and treatment. Placing patient at risk for multiple co-morbidities as well as early death and contributing to the patient's presentation.   - Counseled on weight loss.      #BETSY  - BiPAP QHS    DVT Prophylaxis: Eliquis  Diet: DIET LOW FIBER; Daily Fluid Restriction: 1500 ml  Dietary Nutrition Supplements: Low Calorie High Protein Supplement  Code Status: Full Code    DISPO:  Hold diuretics, IV NS today.   Continue bowel caroline Durham PA-C  3/14/2020 9:45 AM      Agree with above  Changes made to note    Carolina Soriano MD 3/14/2020 1:14 PM

## 2020-03-14 NOTE — PROGRESS NOTES
Handoff report and transfer of care given at bedside to [facility]. Patient in stable condition, denies needs/concerns at this time. Call light within reach.

## 2020-03-14 NOTE — PLAN OF CARE
Problem: Falls - Risk of:  Goal: Will remain free from falls  Description: Will remain free from falls  Outcome: Ongoing  Goal: Absence of physical injury  Description: Absence of physical injury  Outcome: Ongoing     Problem: Risk for Impaired Skin Integrity  Goal: Tissue integrity - skin and mucous membranes  Description: Structural intactness and normal physiological function of skin and  mucous membranes. Outcome: Ongoing     Problem: Pain:  Description: Pain management should include both nonpharmacologic and pharmacologic interventions.   Goal: Pain level will decrease  Description: Pain level will decrease  Outcome: Ongoing  Goal: Control of acute pain  Description: Control of acute pain  Outcome: Ongoing  Goal: Control of chronic pain  Description: Control of chronic pain  Outcome: Ongoing     Problem: Nutrition  Goal: Optimal nutrition therapy  Outcome: Ongoing  Goal: Understanding of nutritional guidelines  Outcome: Ongoing     Problem: ACTIVITY INTOLERANCE/IMPAIRED MOBILITY  Goal: Mobility/activity is maintained at optimum level for patient  Outcome: Ongoing

## 2020-03-14 NOTE — FLOWSHEET NOTE
03/14/20 0830   Vital Signs   Temp 97.2 °F (36.2 °C)   Temp Source Oral   Pulse 77   Heart Rate Source Monitor   Resp 19   BP (!) 157/67   BP Location Right lower arm   BP Upper/Lower Lower   Patient Position Supine   Level of Consciousness 0   MEWS Score 1   Pain Assessment   Pain Assessment 0-10   Pain Level 8   Pain Type Acute pain   Pain Location Rib cage   Pain Orientation Right   Pain Frequency Intermittent   Pain Descriptors Other (Comment)  (hurts with breathing )   Pain Onset On-going   Clinical Progression Not changed   Functional Pain Assessment Activities are not prevented   Non-Pharmaceutical Pain Intervention(s) Repositioned   Oxygen Therapy   SpO2 98 %   O2 Device Nasal cannula   O2 Flow Rate (L/min) 3 L/min   AM assessment completed, see flow sheet. Pt is alert and oriented. Vital signs are WNL. Respirations are even & easy. No complaints voiced. Abdomen distended and taut. No c/o abdominal pain or nausea. Pt denies needs at this time. SR up x 2, and bed in low position. Call light is within reach.

## 2020-03-14 NOTE — PROGRESS NOTES
Shift assessment complete; see flow sheet. Scheduled medications administered; See MAR. IV flushed without difficulty. O2 3 LPM nc in place. Pt c/o gneralized pain 7/10 PRN percocet given at this time. Pt denies any needs at this time.  Pt educated on use of call light and to call out with needs, verbalized understanding, bed in low locked position for pt safety

## 2020-03-14 NOTE — PROGRESS NOTES
PROGRESS NOTE  S:79 yrs Patient  admitted on 2/29/2020 with Intractable pain [R52]  Intractable pain [R52] . Today she feels ok. Had multiple BMs yesterday after sitting up in chair. Exam:   Vitals:    03/14/20 1116   BP:    Pulse:    Resp:    Temp:    SpO2: 97%      General appearance: alert, appears stated age and cooperative  HEENT: Oropharynx clear, no lesions  Neck: no adenopathy and supple, symmetrical, trachea midline  Lungs: clear to auscultation bilaterally  Heart: regular rate and rhythm, S1, S2 normal, no murmur, click, rub or gallop  Abdomen: distended, NT, BS+  Extremities: edema 2+     Medications: Reviewed    Labs:  CBC:   Recent Labs     03/12/20  0542 03/13/20  0635 03/14/20  0600   WBC 14.8* 15.1* 11.4*   HGB 9.3* 9.6* 9.7*   HCT 28.9* 30.1* 31.4*   MCV 86.2 86.8 87.2    364 373     BMP:   Recent Labs     03/12/20  0542 03/13/20  0635 03/14/20  0600   * 134* 136   K 3.5 3.5 3.9   CL 98* 98* 102   CO2 22 22 21   PHOS 3.2 4.5 4.3   BUN 13 17 22*   CREATININE 1.1 1.4* 1.7*       Impression:70year-old female with a history of anemia, asthma, atrial fibrillation, CKD, diabetes, diverticulosis, GERD, HTN, HLD, and hypothyroidism admitted with a fall complicated by colon pseudobstriction    Recommendation:  1. Continue supportive care  2. Aggressive PT/OT  3. Sit up in chair TID  4. Low fiber diet  5. miralax BID  6.  Will follow      Melina Potter MD  3:27 PM 3/14/2020

## 2020-03-15 LAB
ALBUMIN SERPL-MCNC: 2.6 G/DL (ref 3.4–5)
ANION GAP SERPL CALCULATED.3IONS-SCNC: 16 MMOL/L (ref 3–16)
BUN BLDV-MCNC: 27 MG/DL (ref 7–20)
CALCIUM SERPL-MCNC: 8.9 MG/DL (ref 8.3–10.6)
CHLORIDE BLD-SCNC: 101 MMOL/L (ref 99–110)
CO2: 19 MMOL/L (ref 21–32)
CREAT SERPL-MCNC: 1.5 MG/DL (ref 0.6–1.2)
GFR AFRICAN AMERICAN: 41
GFR NON-AFRICAN AMERICAN: 34
GLUCOSE BLD-MCNC: 124 MG/DL (ref 70–99)
PHOSPHORUS: 3.3 MG/DL (ref 2.5–4.9)
POTASSIUM SERPL-SCNC: 4.5 MMOL/L (ref 3.5–5.1)
SODIUM BLD-SCNC: 136 MMOL/L (ref 136–145)

## 2020-03-15 PROCEDURE — 99232 SBSQ HOSP IP/OBS MODERATE 35: CPT | Performed by: INTERNAL MEDICINE

## 2020-03-15 PROCEDURE — 97530 THERAPEUTIC ACTIVITIES: CPT

## 2020-03-15 PROCEDURE — 2580000003 HC RX 258: Performed by: INTERNAL MEDICINE

## 2020-03-15 PROCEDURE — 94640 AIRWAY INHALATION TREATMENT: CPT

## 2020-03-15 PROCEDURE — 6370000000 HC RX 637 (ALT 250 FOR IP): Performed by: INTERNAL MEDICINE

## 2020-03-15 PROCEDURE — 2700000000 HC OXYGEN THERAPY PER DAY

## 2020-03-15 PROCEDURE — 6370000000 HC RX 637 (ALT 250 FOR IP): Performed by: PHYSICIAN ASSISTANT

## 2020-03-15 PROCEDURE — 1200000000 HC SEMI PRIVATE

## 2020-03-15 PROCEDURE — 94669 MECHANICAL CHEST WALL OSCILL: CPT

## 2020-03-15 PROCEDURE — 80069 RENAL FUNCTION PANEL: CPT

## 2020-03-15 PROCEDURE — 36415 COLL VENOUS BLD VENIPUNCTURE: CPT

## 2020-03-15 PROCEDURE — 94761 N-INVAS EAR/PLS OXIMETRY MLT: CPT

## 2020-03-15 RX ORDER — FUROSEMIDE 20 MG/1
20 TABLET ORAL DAILY
Status: DISCONTINUED | OUTPATIENT
Start: 2020-03-15 | End: 2020-03-17

## 2020-03-15 RX ADMIN — METOPROLOL TARTRATE 50 MG: 50 TABLET, FILM COATED ORAL at 22:12

## 2020-03-15 RX ADMIN — Medication 10 ML: at 22:13

## 2020-03-15 RX ADMIN — HYDRALAZINE HYDROCHLORIDE 25 MG: 25 TABLET, FILM COATED ORAL at 08:55

## 2020-03-15 RX ADMIN — APIXABAN 2.5 MG: 5 TABLET, FILM COATED ORAL at 08:54

## 2020-03-15 RX ADMIN — IPRATROPIUM BROMIDE AND ALBUTEROL SULFATE 3 ML: 2.5; .5 SOLUTION RESPIRATORY (INHALATION) at 14:59

## 2020-03-15 RX ADMIN — IPRATROPIUM BROMIDE AND ALBUTEROL SULFATE 3 ML: 2.5; .5 SOLUTION RESPIRATORY (INHALATION) at 19:29

## 2020-03-15 RX ADMIN — CARBIDOPA AND LEVODOPA 1 TABLET: 25; 100 TABLET ORAL at 14:27

## 2020-03-15 RX ADMIN — LINACLOTIDE 145 MCG: 145 CAPSULE, GELATIN COATED ORAL at 09:37

## 2020-03-15 RX ADMIN — PROPAFENONE HYDROCHLORIDE 150 MG: 150 TABLET, FILM COATED ORAL at 14:27

## 2020-03-15 RX ADMIN — IPRATROPIUM BROMIDE AND ALBUTEROL SULFATE 3 ML: 2.5; .5 SOLUTION RESPIRATORY (INHALATION) at 06:31

## 2020-03-15 RX ADMIN — Medication 2 PUFF: at 19:29

## 2020-03-15 RX ADMIN — DULOXETINE HYDROCHLORIDE 60 MG: 60 CAPSULE, DELAYED RELEASE ORAL at 08:56

## 2020-03-15 RX ADMIN — APIXABAN 2.5 MG: 5 TABLET, FILM COATED ORAL at 22:12

## 2020-03-15 RX ADMIN — SENNOSIDES AND DOCUSATE SODIUM 2 TABLET: 8.6; 5 TABLET ORAL at 08:53

## 2020-03-15 RX ADMIN — Medication 2 PUFF: at 06:31

## 2020-03-15 RX ADMIN — CARBIDOPA AND LEVODOPA 1 TABLET: 25; 100 TABLET ORAL at 08:56

## 2020-03-15 RX ADMIN — NIFEDIPINE 60 MG: 30 TABLET, FILM COATED, EXTENDED RELEASE ORAL at 08:53

## 2020-03-15 RX ADMIN — BISACODYL 10 MG: 10 SUPPOSITORY RECTAL at 08:56

## 2020-03-15 RX ADMIN — OXYCODONE HYDROCHLORIDE AND ACETAMINOPHEN 2 TABLET: 5; 325 TABLET ORAL at 18:54

## 2020-03-15 RX ADMIN — HYDRALAZINE HYDROCHLORIDE 25 MG: 25 TABLET, FILM COATED ORAL at 22:12

## 2020-03-15 RX ADMIN — LAMOTRIGINE 50 MG: 25 TABLET ORAL at 22:12

## 2020-03-15 RX ADMIN — PROPAFENONE HYDROCHLORIDE 150 MG: 150 TABLET, FILM COATED ORAL at 22:16

## 2020-03-15 RX ADMIN — IPRATROPIUM BROMIDE AND ALBUTEROL SULFATE 3 ML: 2.5; .5 SOLUTION RESPIRATORY (INHALATION) at 10:46

## 2020-03-15 RX ADMIN — LAMOTRIGINE 50 MG: 25 TABLET ORAL at 08:54

## 2020-03-15 RX ADMIN — ATORVASTATIN CALCIUM 20 MG: 10 TABLET, FILM COATED ORAL at 22:11

## 2020-03-15 RX ADMIN — FUROSEMIDE 20 MG: 20 TABLET ORAL at 12:25

## 2020-03-15 RX ADMIN — PROPAFENONE HYDROCHLORIDE 150 MG: 150 TABLET, FILM COATED ORAL at 09:15

## 2020-03-15 RX ADMIN — METOPROLOL TARTRATE 50 MG: 50 TABLET, FILM COATED ORAL at 08:56

## 2020-03-15 RX ADMIN — Medication 10 ML: at 08:52

## 2020-03-15 RX ADMIN — MONTELUKAST SODIUM 10 MG: 10 TABLET, COATED ORAL at 22:12

## 2020-03-15 RX ADMIN — CARBIDOPA AND LEVODOPA 1 TABLET: 25; 100 TABLET ORAL at 22:11

## 2020-03-15 ASSESSMENT — PAIN DESCRIPTION - FREQUENCY: FREQUENCY: INTERMITTENT

## 2020-03-15 ASSESSMENT — PAIN DESCRIPTION - PROGRESSION: CLINICAL_PROGRESSION: NOT CHANGED

## 2020-03-15 ASSESSMENT — PAIN DESCRIPTION - ORIENTATION: ORIENTATION: RIGHT

## 2020-03-15 ASSESSMENT — PAIN DESCRIPTION - PAIN TYPE: TYPE: ACUTE PAIN

## 2020-03-15 ASSESSMENT — PAIN DESCRIPTION - DESCRIPTORS: DESCRIPTORS: SHARP

## 2020-03-15 ASSESSMENT — PAIN DESCRIPTION - LOCATION: LOCATION: RIB CAGE

## 2020-03-15 ASSESSMENT — PAIN SCALES - GENERAL: PAINLEVEL_OUTOF10: 9

## 2020-03-15 ASSESSMENT — PAIN DESCRIPTION - ONSET: ONSET: ON-GOING

## 2020-03-15 NOTE — PROGRESS NOTES
Bedside report given and pt care transferred to [facility]. Pt denies needs at this time. Call light within reach.

## 2020-03-15 NOTE — PROGRESS NOTES
safe and more indpt functioning. GOALS  1). Bed to [facility] [address] 13 and with use of RW     To be met in 5 Visits:  1). Supine to Sit: Supervision  2). Upper Body Bathing:  Min A  3). Lower Body Bathing:  Min A with appropriate AE  4). Upper Body Dressing: Min A  5). Lower Body Dressing: Min A with appropriate AE   6).  Pt to kimo UE exs x 15 reps      Plan: cont with POC      BRUNO Caldera, OTR/L   [id number]       If patient discharges from this facility prior to next visit, this note will serve as the Discharge Summary

## 2020-03-15 NOTE — PROGRESS NOTES
Inpatient Physical Therapy Daily Treatment Note    Unit: [address]  Date:  3/15/2020  Patient Name:    Olivia Mccord  Admitting diagnosis:  Intractable pain [R52]  Intractable pain [R52]  Admit Date:  2/29/2020  Precautions/Restrictions:  fall risk, IV, bed/chair alarm, supplemental O2 (3 L) and purewick catheter      Discharge Recommendations: SNF  DME needs for discharge: defer to facility       Therapy recommendation for EMS Transport: requires transport by cot due to inability to transfer without lift equiptment    Therapy recommendations for staff:   Assist of 2 with use of STEDY and gait belt for all transfers to/from BSC/chair    History of Present Illness: Pt with  Hx of PD was attempting to transfer to her car in her garage when she states her legs gave out and she fell, hitting her side on the running board of her car.  Severe pain.  Pt was unable to ambulate following the fall 2/2 pain & weakness. Work up in the ED showed a small effusion on the right poss related to atelectasis. CT cervical spine and head were negative.   PMH: Parkinson's Disease, Asthma, PAF, CKD stage III, DM controlled with diet, HTN, HLD     Home Health S4 Level Recommendation: NA  AM-PAC Mobility Score   AM-PAC Inpatient Mobility Raw Score : 10       Treatment Time:  15:40-16:20  Treatment number: 6  Timed Code Treatment Minutes: 40 minutes  Total Treatment Minutes:  40  minutes    Cognition    A&O orientation not directly assessed. Able to follow 2 step commands    Subjective  Patient reclined in chair with no family present  Pt agreeable to this PT tx. Pain   Yes  Location: R lateral ribcage and L jaw/ear area  Rating:  moderate/10  Pain Medicine Status: RN notified     Bed Mobility   Supine to Sit:   Not Tested  Sit to Supine:  Max A of 2  Rolling: Mod A  Scooting: Max A of 2    Transfer Training     Sit to stand:    Mod A  Stand to sit:   Mod A of 2  Bed to Chair:  Total A via Stedy  Gait Training gait deferred due to

## 2020-03-15 NOTE — PROGRESS NOTES
Shift assessment complete; see flow sheet. Scheduled medications administered; See MAR. IV infusing without difficulty. O2 3LPM nc in place. Pt c/o abdominal pain 7/10 PRN Percocet given at this time. Pt denies any needs at this time. Call light within reach, bed in low locked position, bed alarm on.

## 2020-03-15 NOTE — PROGRESS NOTES
PROGRESS NOTE  S:79 yrs Patient  admitted on 2/29/2020 with Intractable pain [R52]  Intractable pain [R52] . Today she complains of Abdominal Pain and distention. She only sat up in chair once yesterday. She only had 1BM yesterday. Exam:   Vitals:    03/15/20 0634   BP:    Pulse:    Resp:    Temp:    SpO2: (!) 88%      General appearance: alert, appears stated age and cooperative  HEENT: Oropharynx clear, no lesions  Neck: no adenopathy and supple, symmetrical, trachea midline  Lungs: clear to auscultation bilaterally  Heart: regular rate and rhythm, S1, S2 normal, no murmur, click, rub or gallop  Abdomen: distended  Extremities: edema 2+     Medications: Reviewed    Labs:  CBC:   Recent Labs     03/13/20  0635 03/14/20  0600   WBC 15.1* 11.4*   HGB 9.6* 9.7*   HCT 30.1* 31.4*   MCV 86.8 87.2    373     BMP:   Recent Labs     03/13/20  0635 03/14/20  0600 03/15/20  0537   * 136 136   K 3.5 3.9 4.5   CL 98* 102 101   CO2 22 21 19*   PHOS 4.5 4.3 3.3   BUN 17 22* 27*   CREATININE 1.4* 1.7* 1.5*       Impression:70year-old female with a history of anemia, asthma, atrial fibrillation, CKD, diabetes, diverticulosis, GERD, HTN, HLD, and hypothyroidism admitted with a fall complicated by colon pseudobstriction. Her symptoms are exacerbated by inactivity    Recommendation:  1. Continue supportive care  2. Aggressive PT/OT  3. Sit up in chair TID  4. Low fiber diet  5. Minimize narcotics  6.  Will follow      Nikko Schofield MD  8:28 AM 3/15/2020

## 2020-03-15 NOTE — PROGRESS NOTES
Pt is up in chair. No s/s of distress at this time. Call light and bedside table within reach. Will continue to monitor.

## 2020-03-15 NOTE — PROGRESS NOTES
4 Eyes Skin Assessment     The patient is being assess for   low vineet     I agree that 2 RN's have performed a thorough Head to Toe Skin Assessment on the patient. ALL assessment sites listed below have been assessed. Areas assessed by both nurses:   [x]   Head, Face, and Ears   [x]   Shoulders, Back, and Chest, Abdomen  [x]   Arms, Elbows, and Hands   [x]   Coccyx, Sacrum, and Ischium  [x]   Legs, Feet, and Heels        Scattered abrasions and bruising. Pink and blanchable coccyx. Stage 2 to left buttock is callous like in appearance and to the touch, blanching noted. Heels pink and blanchable. **SHARE this note so that the co-signing nurse is able to place an eSignature**    Co-signer eSignature: Electronically signed by Kvng Celis RN on 3/15/20 at 7:04 PM EDT    Does the Patient have Skin Breakdown?   No          Vineet Prevention initiated:  Yes   Wound Care Orders initiated:  NA      [facility] nurse consulted for Pressure Injury (Stage 3,4, Unstageable, DTI, NWPT, Complex wounds)and New or Established Ostomies:  NA      Primary Nurse eSignature: Electronically signed by Reynaldo Venegas RN on 3/15/20 at 7:03 PM EDT

## 2020-03-15 NOTE — PLAN OF CARE
Problem: Falls - Risk of:  Goal: Will remain free from falls  Description: Will remain free from falls  3/15/2020 1015 by Melisa Harry RN  Outcome: Ongoing  3/15/2020 0406 by Kali Abreu RN  Outcome: Ongoing  Goal: Absence of physical injury  Description: Absence of physical injury  3/15/2020 1015 by Melisa Harry RN  Outcome: Ongoing  3/15/2020 0406 by Kali Abreu RN  Outcome: Ongoing     Problem: Risk for Impaired Skin Integrity  Goal: Tissue integrity - skin and mucous membranes  Description: Structural intactness and normal physiological function of skin and  mucous membranes.   3/15/2020 1015 by Melisa Harry RN  Outcome: Ongoing  3/15/2020 0406 by Kali Abreu RN  Outcome: Ongoing     Problem: Pain:  Goal: Pain level will decrease  Description: Pain level will decrease  3/15/2020 1015 by Melisa Harry RN  Outcome: Ongoing  3/15/2020 0406 by Kali Abreu RN  Outcome: Ongoing  Goal: Control of acute pain  Description: Control of acute pain  3/15/2020 1015 by Melisa Harry RN  Outcome: Ongoing  3/15/2020 0406 by Kali Abreu RN  Outcome: Ongoing  Goal: Control of chronic pain  Description: Control of chronic pain  3/15/2020 1015 by Melisa Harry RN  Outcome: Ongoing  3/15/2020 0406 by Kali Abreu RN  Outcome: Ongoing     Problem: Nutrition  Goal: Optimal nutrition therapy  3/15/2020 1015 by Melisa Harry RN  Outcome: Ongoing  3/15/2020 0406 by Kali Abreu RN  Outcome: Ongoing  Goal: Understanding of nutritional guidelines  3/15/2020 1015 by Melisa Harry RN  Outcome: Ongoing  3/15/2020 0406 by Kali Abreu RN  Outcome: Ongoing     Problem: ACTIVITY INTOLERANCE/IMPAIRED MOBILITY  Goal: Mobility/activity is maintained at optimum level for patient  3/15/2020 1015 by Melisa Harry RN  Outcome: Ongoing  3/15/2020 0406 by Kali Abreu RN  Outcome: Ongoing     Care plan ongoing

## 2020-03-16 ENCOUNTER — APPOINTMENT (OUTPATIENT)
Dept: CT IMAGING | Age: 80
DRG: 205 | End: 2020-03-16
Payer: MEDICARE

## 2020-03-16 ENCOUNTER — APPOINTMENT (OUTPATIENT)
Dept: GENERAL RADIOLOGY | Age: 80
DRG: 205 | End: 2020-03-16
Payer: MEDICARE

## 2020-03-16 LAB
ALBUMIN SERPL-MCNC: 2.7 G/DL (ref 3.4–5)
ANION GAP SERPL CALCULATED.3IONS-SCNC: 15 MMOL/L (ref 3–16)
BASOPHILS ABSOLUTE: 0.1 K/UL (ref 0–0.2)
BASOPHILS RELATIVE PERCENT: 0.5 %
BUN BLDV-MCNC: 32 MG/DL (ref 7–20)
CALCIUM SERPL-MCNC: 8.7 MG/DL (ref 8.3–10.6)
CHLORIDE BLD-SCNC: 104 MMOL/L (ref 99–110)
CO2: 19 MMOL/L (ref 21–32)
CREAT SERPL-MCNC: 1.4 MG/DL (ref 0.6–1.2)
EOSINOPHILS ABSOLUTE: 0 K/UL (ref 0–0.6)
EOSINOPHILS RELATIVE PERCENT: 0.3 %
GFR AFRICAN AMERICAN: 44
GFR NON-AFRICAN AMERICAN: 36
GLUCOSE BLD-MCNC: 133 MG/DL (ref 70–99)
HCT VFR BLD CALC: 29 % (ref 36–48)
HEMOGLOBIN: 8.9 G/DL (ref 12–16)
LYMPHOCYTES ABSOLUTE: 0.8 K/UL (ref 1–5.1)
LYMPHOCYTES RELATIVE PERCENT: 5.3 %
MCH RBC QN AUTO: 26.6 PG (ref 26–34)
MCHC RBC AUTO-ENTMCNC: 30.8 G/DL (ref 31–36)
MCV RBC AUTO: 86.6 FL (ref 80–100)
MONOCYTES ABSOLUTE: 1.5 K/UL (ref 0–1.3)
MONOCYTES RELATIVE PERCENT: 9.6 %
NEUTROPHILS ABSOLUTE: 13.2 K/UL (ref 1.7–7.7)
NEUTROPHILS RELATIVE PERCENT: 84.3 %
PDW BLD-RTO: 14.6 % (ref 12.4–15.4)
PHOSPHORUS: 2.8 MG/DL (ref 2.5–4.9)
PLATELET # BLD: 408 K/UL (ref 135–450)
PMV BLD AUTO: 8.7 FL (ref 5–10.5)
POTASSIUM SERPL-SCNC: 3.5 MMOL/L (ref 3.5–5.1)
RBC # BLD: 3.35 M/UL (ref 4–5.2)
SODIUM BLD-SCNC: 138 MMOL/L (ref 136–145)
WBC # BLD: 15.7 K/UL (ref 4–11)

## 2020-03-16 PROCEDURE — 80069 RENAL FUNCTION PANEL: CPT

## 2020-03-16 PROCEDURE — 36415 COLL VENOUS BLD VENIPUNCTURE: CPT

## 2020-03-16 PROCEDURE — 6370000000 HC RX 637 (ALT 250 FOR IP): Performed by: INTERNAL MEDICINE

## 2020-03-16 PROCEDURE — 2580000003 HC RX 258: Performed by: INTERNAL MEDICINE

## 2020-03-16 PROCEDURE — 97530 THERAPEUTIC ACTIVITIES: CPT

## 2020-03-16 PROCEDURE — 2700000000 HC OXYGEN THERAPY PER DAY

## 2020-03-16 PROCEDURE — 94669 MECHANICAL CHEST WALL OSCILL: CPT

## 2020-03-16 PROCEDURE — 94640 AIRWAY INHALATION TREATMENT: CPT

## 2020-03-16 PROCEDURE — 99232 SBSQ HOSP IP/OBS MODERATE 35: CPT | Performed by: INTERNAL MEDICINE

## 2020-03-16 PROCEDURE — 6360000002 HC RX W HCPCS: Performed by: INTERNAL MEDICINE

## 2020-03-16 PROCEDURE — 70490 CT SOFT TISSUE NECK W/O DYE: CPT

## 2020-03-16 PROCEDURE — 6370000000 HC RX 637 (ALT 250 FOR IP): Performed by: PHYSICIAN ASSISTANT

## 2020-03-16 PROCEDURE — 94761 N-INVAS EAR/PLS OXIMETRY MLT: CPT

## 2020-03-16 PROCEDURE — 1200000000 HC SEMI PRIVATE

## 2020-03-16 PROCEDURE — 74019 RADEX ABDOMEN 2 VIEWS: CPT

## 2020-03-16 PROCEDURE — 85025 COMPLETE CBC W/AUTO DIFF WBC: CPT

## 2020-03-16 RX ORDER — SODIUM CHLORIDE 9 MG/ML
INJECTION, SOLUTION INTRAVENOUS CONTINUOUS
Status: DISCONTINUED | OUTPATIENT
Start: 2020-03-16 | End: 2020-03-17

## 2020-03-16 RX ADMIN — HYDRALAZINE HYDROCHLORIDE 25 MG: 25 TABLET, FILM COATED ORAL at 10:07

## 2020-03-16 RX ADMIN — CARBIDOPA AND LEVODOPA 1 TABLET: 25; 100 TABLET ORAL at 15:03

## 2020-03-16 RX ADMIN — PIPERACILLIN SODIUM AND TAZOBACTAM SODIUM 3.38 G: 3; .375 INJECTION, POWDER, LYOPHILIZED, FOR SOLUTION INTRAVENOUS at 23:17

## 2020-03-16 RX ADMIN — OXYCODONE HYDROCHLORIDE AND ACETAMINOPHEN 2 TABLET: 5; 325 TABLET ORAL at 05:52

## 2020-03-16 RX ADMIN — Medication 2 PUFF: at 19:42

## 2020-03-16 RX ADMIN — CARBIDOPA AND LEVODOPA 1 TABLET: 25; 100 TABLET ORAL at 10:07

## 2020-03-16 RX ADMIN — ONDANSETRON HYDROCHLORIDE 4 MG: 2 INJECTION, SOLUTION INTRAMUSCULAR; INTRAVENOUS at 13:44

## 2020-03-16 RX ADMIN — OXYCODONE HYDROCHLORIDE AND ACETAMINOPHEN 2 TABLET: 5; 325 TABLET ORAL at 10:07

## 2020-03-16 RX ADMIN — SODIUM CHLORIDE: 9 INJECTION, SOLUTION INTRAVENOUS at 18:34

## 2020-03-16 RX ADMIN — PROPAFENONE HYDROCHLORIDE 150 MG: 150 TABLET, FILM COATED ORAL at 05:52

## 2020-03-16 RX ADMIN — Medication 2 PUFF: at 07:40

## 2020-03-16 RX ADMIN — LAMOTRIGINE 50 MG: 25 TABLET ORAL at 10:07

## 2020-03-16 RX ADMIN — IPRATROPIUM BROMIDE AND ALBUTEROL SULFATE 3 ML: 2.5; .5 SOLUTION RESPIRATORY (INHALATION) at 15:38

## 2020-03-16 RX ADMIN — METOPROLOL TARTRATE 50 MG: 50 TABLET, FILM COATED ORAL at 10:06

## 2020-03-16 RX ADMIN — IPRATROPIUM BROMIDE AND ALBUTEROL SULFATE 3 ML: 2.5; .5 SOLUTION RESPIRATORY (INHALATION) at 07:40

## 2020-03-16 RX ADMIN — VANCOMYCIN HYDROCHLORIDE 1.5 G: 10 INJECTION, POWDER, LYOPHILIZED, FOR SOLUTION INTRAVENOUS at 16:18

## 2020-03-16 RX ADMIN — PROPAFENONE HYDROCHLORIDE 150 MG: 150 TABLET, FILM COATED ORAL at 15:03

## 2020-03-16 RX ADMIN — IPRATROPIUM BROMIDE AND ALBUTEROL SULFATE 3 ML: 2.5; .5 SOLUTION RESPIRATORY (INHALATION) at 19:40

## 2020-03-16 RX ADMIN — Medication 10 ML: at 13:45

## 2020-03-16 RX ADMIN — DULOXETINE HYDROCHLORIDE 60 MG: 60 CAPSULE, DELAYED RELEASE ORAL at 10:07

## 2020-03-16 RX ADMIN — IPRATROPIUM BROMIDE AND ALBUTEROL SULFATE 3 ML: 2.5; .5 SOLUTION RESPIRATORY (INHALATION) at 11:32

## 2020-03-16 RX ADMIN — NIFEDIPINE 60 MG: 30 TABLET, FILM COATED, EXTENDED RELEASE ORAL at 10:06

## 2020-03-16 RX ADMIN — PIPERACILLIN SODIUM AND TAZOBACTAM SODIUM 3.38 G: 3; .375 INJECTION, POWDER, LYOPHILIZED, FOR SOLUTION INTRAVENOUS at 15:04

## 2020-03-16 RX ADMIN — APIXABAN 2.5 MG: 5 TABLET, FILM COATED ORAL at 10:07

## 2020-03-16 RX ADMIN — LINACLOTIDE 145 MCG: 145 CAPSULE, GELATIN COATED ORAL at 05:53

## 2020-03-16 RX ADMIN — FUROSEMIDE 20 MG: 20 TABLET ORAL at 10:08

## 2020-03-16 ASSESSMENT — PAIN DESCRIPTION - PAIN TYPE: TYPE: ACUTE PAIN

## 2020-03-16 ASSESSMENT — PAIN SCALES - GENERAL
PAINLEVEL_OUTOF10: 9
PAINLEVEL_OUTOF10: 9
PAINLEVEL_OUTOF10: 4
PAINLEVEL_OUTOF10: 4

## 2020-03-16 ASSESSMENT — PAIN DESCRIPTION - ORIENTATION: ORIENTATION: RIGHT

## 2020-03-16 ASSESSMENT — PAIN DESCRIPTION - DESCRIPTORS: DESCRIPTORS: ACHING;SHARP

## 2020-03-16 ASSESSMENT — PAIN DESCRIPTION - LOCATION: LOCATION: RIB CAGE

## 2020-03-16 NOTE — PROGRESS NOTES
3/8/2020    COLONOSCOPY FLEXIBLE W/ DECOMPRESSION performed by Sobeida Morales MD at [facility]      HYSTERECTOMY      JOINT REPLACEMENT Bilateral 2003    KNEE SURGERY      replacement x 2    SHOULDER SURGERY      TONSILLECTOMY      TUBAL LIGATION      VITRECTOMY         Level of Consciousness: Alert, Oriented, and Cooperative = 0    Level of Activity: Bedridden, unresponsive or quadriplegic = 4    Respiratory Pattern: Dyspnea with exertion;Irregular pattern;or RR less than 6 = 2    Breath Sounds: Diminshed bilaterally and/or crackles = 2    Sputum  Sputum Color: Frothy, Tenacity: Frothy, Sputum How Obtained: Spontaneous cough  Cough: Strong, spontaneous, non-productive = 0    Vital Signs   BP (!) 127/94   Pulse 82   Temp 98.1 °F (36.7 °C) (Oral)   Resp 18   Ht 5' 6\" (1.676 m)   Wt 281 lb (127.5 kg)   SpO2 97%   BMI 45.35 kg/m²   SPO2 (COPD values may differ): 88-89% on room air or greater than 92% on FiO2 28- 35% = 2    Peak Flow (asthma only): not applicable = 0    RSI: 81-84 = Q6H or QID and Q4HPRN for dyspnea        Plan       Goals: medication delivery    Patient/caregiver was educated on the proper method of use for Respiratory Care Devices:  Yes      Level of patient/caregiver understanding able to:   ? Verbalize understanding   ? Demonstrate understanding       ? Teach back        ? Needs reinforcement       ? No available caregiver               ? Other:     Response to education:  Good     Is patient being placed on Home Treatment Regimen? No     Does the patient have everything they need prior to discharge? NA     Comments: chart reviewed and patient assessed    Plan of Care: keep duoneb qid per assessment    Electronically signed by Julia Kirkland RCP on 3/16/2020 at 1:03 AM    Respiratory Protocol Guidelines     1.  Assessment and treatment by Respiratory Therapy will be initiated for medication and therapeutic interventions upon initiation of aerosolized medication. 2. Physician will be contacted for respiratory rate (RR) greater than 35 breaths per minute. Therapy will be held for heart rate (HR) greater than 140 beats per minute, pending direction from physician. 3. Bronchodilators will be administered via Metered Dose Inhaler (MDI) with spacer when the following criteria are met:  a. Alert and cooperative     b. HR < 140 bpm  c. RR < 30 bpm                d. Can demonstrate a 2-3 second inspiratory hold  4. Bronchodilators will be administered via Hand Held Nebulizer [facility]) to patients when ANY of the following criteria are met  a. Incognizant or uncooperative          b. Patients treated with HHN at Home        c. Unable to demonstrate proper use of MDI with spacer     d. RR > 30 bpm   5. Bronchodilators will be delivered via Metered Dose Inhaler (MDI), HHN, Aerogen to intubated patients on mechanical ventilation. 6. Inhalation medication orders will be delivered and/or substituted as outlined below. Aerosolized Medications Ordering and Administration Guidelines:    1. All Medications will be ordered by a physician, and their frequency and/or modality will be adjusted as defined by the patients Respiratory Severity Index (RSI) score. 2. If the patient does not have documented COPD, consider discontinuing anticholinergics when RSI is less than 9.  3. If the bronchospasm worsens (increased RSI), then the bronchodilator frequency can be increased to a maximum of every 4 hours. If greater than every 4 hours is required, the physician will be contacted. 4. If the bronchospasm improves, the frequency of the bronchodilator can be decreased, based on the patient's RSI, but not less than home treatment regimen frequency. 5. Bronchodilator(s) will be discontinued if patient has a RSI less than 9 and has received no scheduled or as needed treatment for 72  Hrs. Patients Ordered on a Mucolytic Agent:    1.  Must always be administered with a bronchodilator. 2. Discontinue if patient experiences worsened bronchospasm, or secretions have lessened to the point that the patient is able to clear them with a cough. Anti-inflammatory and Combination Medications:    1. If the patient lacks prior history of lung disease, is not using inhaled anti-inflammatory medication at home, and lacks wheezing by examination or by history for at least 24 hours, contact physician for possible discontinuation.

## 2020-03-16 NOTE — PROGRESS NOTES
Handoff report and transfer of care given at bedside to [facility].. Patient in stable condition, denies needs/concerns at this time. Call light within reach.

## 2020-03-16 NOTE — PROGRESS NOTES
Bed Mobility   Supine to Sit:   Mod A of 2  Sit to Supine:  Max A  Rolling: Max A (multiple trials to L and R, pt required increased assistance with hand placement on rail with increased fatigue)  Scooting: Max A    Transfer Training     Sit to stand: Mod A of 2 to STEDY  Stand to sit:   Mod A of 2 from STEDY  Bed to Chair:  Not Tested with use of N/A    Gait Training gait deferred due to fatigue and pt having continuous liquid bowel movements with mobility; pt ambulated 0 ft. Distance:  N/A ft  Deviations (firm surface/linoleum): N/A   Assistive Device Used:  N/A  Level of Assist: N/A  Comment:     Stair Training deferred, pt unsafe/not appropriate to complete stairs at this time    Therapeutic Exercise reviewed verbally and assigned at HEP, pt verbalized understanding  Ankle pumps and hip IR/ER, supine in bed    Balance  Static Sitting:  Good - (unilateral UE support at EOB with supervision)  Dynamic Sitting:  Good -   Static Standing: Good -    Tolerance: 1 minute at STEDY with SBA and B UE support, pt with increased discomfort in ribs and leans on forearms for pain relief, fatigues quickly  Dynamic Standing: Not tested    Patient Education      Role of PT, POC, Discharge recommendations, safety awareness, transfer techniques, HEP and calling for assist with mobility. Pt educated on MD's instructions that pt be up 3x/day. Positioning Needs       Pt instructed and educated on use of call light to call for assist if desiring to get up or change position, call light handed to pt and needs within reach, Pt returned to bed, pillows placed for support/comfort, heels floated on pillow  and RN in room . SCDs applied to LEs. Activity Tolerance   Pt completed therapy session with Pain noted with rolling and transfers  SOB noted with bed mobility and transfers, resolves with rest breaks. SpO2:   HR:  BP:     Other  Pt incontinent of loose stool 3x during session, pericare provided with max A.  Pt continued to have loose stool with all bed mobility and transfers. Bed linens and depends changed. Assessment :  Patient continues to fatigue quickly during session due to generalized weakness. Pt is having loose/liquid bowel movements with all bed mobility and transfers, and consistent bed mobility to complete pericare exhausts pt quickly. Discussed concerns about getting pt up to chair with pt's consistent incontinence. Pt is at increased risk for skin breakdown and fatigues too rapidly for the number of transfers it would require to keep her clean and up in the chair. Discussed potential need for rectal tube with RN who plans to discuss with GI. Continue to recommend SNF at discharge. Goals (all goals ongoing unless otherwise indicated)  To be met in 3 visits:  1). Independent with LE Ex x 10 reps - Ongoing     To be met in 6 visits:  1).  Supine to/from sit: [address]  - Ongoing  2).  Sit to/from stand: Min A - Ongoing  3).  Bed to chair: Min A - Ongoing  4).  Gait: Ambulate 50 ft.  with  Min A  and use of rolling walker (RW) - Ongoing  5).  Tolerate B LE exercises 3 sets of 10-15 reps - Ongoing  6).  Ascend/descend 1 step without rail, with Min A and use of RW.  - Ongoing    Plan   Continue with plan of care. Signature: Netta Lemus PT, DPT #[id number]    If patient discharges from this facility prior to next visit, this note will serve as the Discharge Summary.

## 2020-03-16 NOTE — PROGRESS NOTES
Progress Note    Admit Date:  2/29/2020    Admitted with abdominal pain, and distention. She had pseudoobstruction. Over the weekend this had improved. Patient now having multiple loose bowel movements, abdomen distended again today     New problem with significant left neck swelling today , this has become worse since last night  No fevers or chills  No dysphagia or shortness of breath. Subjective:  Ms. Sharath Almodovar complains of swelling and pain and redness in her left neck. Significantly tender. Abd distended, +loose stools  No fever      Objective:   Patient Vitals for the past 4 hrs:   BP Temp Temp src Pulse Resp SpO2   03/16/20 1134 -- -- -- -- 17 93 %   03/16/20 1006 126/60 97.6 °F (36.4 °C) Oral 90 18 91 %        Intake/Output Summary (Last 24 hours) at 3/16/2020 1316  Last data filed at 3/16/2020 0250  Gross per 24 hour   Intake 552 ml   Output 625 ml   Net -73 ml     Physical Exam:    General: elderly female. Awake, alert and oriented. Appears to be in mild  distress  Mucous Membranes:  Pink , anicteric  Neck: No JVD, no carotid bruit, no thyromegaly  Left neck is swollen tender,  red and erythematous,  about 1 x 2 cm  Chest:  Clear to auscultation bilaterally, no added sounds  Right Lower rib cage pain   Cardiovascular:  RRR S1S2 heard, no murmurs or gallops  Abdomen:  Soft, obese,  slightly improved distention, non tender, no organomegaly, BS ++  Extremities: No edema or cyanosis.  Distal pulses well felt  Left UE subcut edema resolving  Neurological : grossly normal    Scheduled Meds:   piperacillin-tazobactam  3.375 g Intravenous Q8H    carbidopa-levodopa  1 tablet Oral TID    furosemide  20 mg Oral Daily    sennosides-docusate sodium  2 tablet Oral Daily    linaclotide  145 mcg Oral QAM AC    bisacodyl  10 mg Rectal Daily    apixaban  2.5 mg Oral BID    DULoxetine  60 mg Oral Daily    atorvastatin  20 mg Oral Nightly    hydrALAZINE  25 mg Oral BID    lamoTRIgine  50 mg Oral BID    metoprolol tartrate  50 mg Oral BID    montelukast  10 mg Oral Nightly    NIFEdipine  60 mg Oral Daily    propafenone  150 mg Oral 3 times per day    sodium chloride flush  10 mL Intravenous 2 times per day    lidocaine  1 patch Transdermal Daily    ipratropium-albuterol  1 vial Inhalation 4x daily    fluticasone  2 puff Inhalation BID       Continuous Infusions:   dextrose         PRN Meds:  oxyCODONE-acetaminophen **OR** oxyCODONE-acetaminophen, glucose, dextrose, glucagon (rDNA), dextrose, acetaminophen **OR** acetaminophen, albuterol sulfate HFA, sodium chloride flush, promethazine **OR** ondansetron      Data:  CBC:   Recent Labs     03/14/20  0600   WBC 11.4*   HGB 9.7*   HCT 31.4*   MCV 87.2        BMP:   Recent Labs     03/14/20  0600 03/15/20  0537 03/16/20  0524    136 138   K 3.9 4.5 3.5    101 104   CO2 21 19* 19*   PHOS 4.3 3.3 2.8   BUN 22* 27* 32*   CREATININE 1.7* 1.5* 1.4*     LIVER PROFILE:   No results for input(s): AST, ALT, LIPASE, BILIDIR, BILITOT, ALKPHOS in the last 72 hours. Invalid input(s): AMYLASE,  ALB    RADIOLOGY  XR ABDOMEN (KUB) (SINGLE AP VIEW)   Final Result   Stable intestinal distension status post rectal tube removal         XR CHEST PORTABLE   Final Result   Cardiomegaly with interval decrease in size of bilateral pleural effusions         XR ABDOMEN (KUB) (SINGLE AP VIEW)   Final Result   1. Colonic tube remains in place. 2. Diffuse colonic distension, slightly improved since 3/9/2020 as described   above. 3. No obvious free air or pneumatosis on supine view. XR ABDOMEN (KUB) (SINGLE AP VIEW)   Final Result   Slight improvement following insertion of rectal tube. Persisting generalized ileus pattern. Distal colonic obstruction remains a   differential consideration.          XR ABDOMEN (KUB) (SINGLE AP VIEW)   Final Result   Probable worsening of diffuse gaseous distention of bowel, either ileus   versus very distal obstruction, favoring the former. Continued follow-up is   advised. XR ABDOMEN (2 VIEWS)   Final Result   Distension of the colon, cecal diameter 14 mm      The findings were sent to the Radiology Results [address] at 12:53   pm on 3/6/2020to be communicated to a licensed caregiver. XR ABDOMEN (KUB) (SINGLE AP VIEW)   Final Result   Unchanged colonic ileus over the past 18 hours         XR ABDOMEN (KUB) (SINGLE AP VIEW)   Final Result   Gaseous distention of the colon without obstruction, possibly an adynamic   ileus or Edgemoor's syndrome. CT ABDOMEN PELVIS WO CONTRAST Additional Contrast? None   Final Result   Marked distension of the ascending and transverse colon with mild gradual   decompression distally. Air-fluid levels are present throughout. 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 As the distal rectum/anus is outside the field of view, recommend rectal exam   to ensure there is no distal mass. Colonic diverticulosis. Small bilateral effusions, increased since 02/29/2020. XR CHEST PORTABLE   Final Result   New bilateral pleural effusions. CT CHEST WO CONTRAST   Final Result   Dependent airspace disease bilaterally, right greater than left. Associated   small effusion on the right is noted. Findings may be related to   atelectasis. A component of contusion in the right lung base would be   difficult to exclude given the history of recent trauma. No pneumothorax. Small mediastinal and retrocrural nodes         CT Cervical Spine WO Contrast   Final Result   No acute abnormality of the cervical spine. CT Head WO Contrast   Final Result   No acute intracranial abnormality. XR CHEST PORTABLE   Final Result   No acute process.          CT SOFT TISSUE NECK WO CONTRAST    (Results Pending)   XR ABDOMEN (2 VIEWS)    (Results Pending)             Assessment/Plan:    #Mechanical Fall  -Patient 1/24/2020  - Cr on admission 2.1->2.3->2.1-> 1.2-> 1.1 -> 1.5 now - s.p IVF  - resumed lasix , hold aldactone    #Leukocytosis  - 15.3->11.7-> 14 -> 15-> 11.4   - appears chronically elevated  - checked CXR repeat 3/12- no new infiltrate     #PAF  - regular rate and rhythm on exam  - cont BB, Rythmol, eliquis      #Chronic Diastolic CHF  - appears compensated  - low Na diet, I&Os, daily weights  - cont BB  - resumed lasix and aldactone, now on hold again 3/14     #Type II DM  - previous hx, not on diabetic meds  - last Hgb A1c: 6.4 on 1/24/20  - controlled with diet, carb control    #HTN  - stable  - cont Hydralazine, Lopressor, Nifedipine    #HLD  - cont Lipitor     #COPD  - no AE  - cont HHN    #Chronic Normocytic Anemia  - Hgb 10.3  - stable, monitor      #Seizure DO  - seizure precautions  - cont Lamictal    #Morbid Obesity  - Body mass index is 41.8 kg/m². - Complicating assessment and treatment.  Placing patient at risk for multiple co-morbidities as well as early death and contributing to the patient's presentation.   - Counseled on weight loss.      #BETSY  - BiPAP QHS    DVT Prophylaxis: Eliquis  Diet: DIET LOW FIBER; Daily Fluid Restriction: 1500 ml  Dietary Nutrition Supplements: Low Calorie High Protein Supplement  Code Status: Full Code      Karson El MD 3/16/2020 1:16 PM

## 2020-03-16 NOTE — PROGRESS NOTES
Pharmacy Note  Vancomycin Consult    Any Starr is a 70 y.o. female started on Vancomycin for Neck abscess; consult received from Dr. Michael Shin to manage therapy. Also receiving the following antibiotics: Zosyn. Patient Active Problem List   Diagnosis    GERD (gastroesophageal reflux disease)    Essential hypertension    Hyperlipidemia    Restless leg syndrome    Chronic normocytic anemia    Postural dizziness    Chronic kidney disease    Moderate persistent asthma    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-sided sciatica    Morbid obesity with BMI of 40.0-44.9, adult (HCC)    Type 2 diabetes mellitus with stage 3 chronic kidney disease, without long-term current use of insulin (HCC)    CAD (coronary artery disease)    Parkinson's disease (HCC)    Severe obstructive sleep apnea    Hypothyroidism    Hepatic steatosis    Diverticulosis    Blind left eye    L carotid artery stenosis s/p CEA    Paroxysmal atrial fibrillation (HCC)    COPD (chronic obstructive pulmonary disease) (HCC)    Acute on chronic respiratory failure with hypoxia (HCC)    Diastolic congestive heart failure (HCC)    Basal cell carcinoma of left cheek    Acute respiratory failure with hypoxemia (HCC)    Pneumonia due to infectious organism    Anemia due to chronic blood loss    Neoplasm of uncertain behavior of skin    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)    Partial symptomatic epilepsy with complex partial seizures, not intractable, without status epilepticus (HCC)    Intractable pain    Chronic kidney disease, stage 3 ([address]    Fall    Hyperkalemia    Skin tear of left hand without complication    Acute kidney injury superimposed on CKD (HCC)    Contusion of rib on right side    Pleural effusion    Chest pain on breathing    Abdominal distension       Allergies:  Levaquin [levofloxacin]; Metoclopramide; Phenazopyridine; Pyridium [phenazopyridine hcl];  Reglan [metoclopramide hcl]; and Reglan [metoclopramide hcl]     Temp max:     Recent Labs     03/15/20  0537 03/16/20  0524   BUN 27* 32*       Recent Labs     03/15/20  0537 03/16/20  0524   CREATININE 1.5* 1.4*       Recent Labs     03/14/20  0600 03/16/20  0524   WBC 11.4* 15.7*         Intake/Output Summary (Last 24 hours) at 3/16/2020 1350  Last data filed at 3/16/2020 0250  Gross per 24 hour   Intake 552 ml   Output 625 ml   Net -73 ml       Culture Date      Source                       Results  NGTD    Ht Readings from Last 1 Encounters:   02/29/20 5' 6\" (1.676 m)        Wt Readings from Last 1 Encounters:   03/13/20 281 lb (127.5 kg)         Body mass index is 45.35 kg/m². Estimated Creatinine Clearance: 46 mL/min (A) (based on SCr of 1.4 mg/dL (H)). Goal Trough Level: 15-18mcg/mL    Assessment/Plan:  Will initiate vancomycin 1500 mg IV every 24 hours. Trough due on 19th at 1430. Thank you for the consult. Will continue to follow.   Blaise Grade Pharm D 3/16/30647:52 PM  .

## 2020-03-16 NOTE — PROGRESS NOTES
Shift assessment complete; see flow sheet. Scheduled medications administered; See MAR. IV infusing without difficulty. O2 3 LPM nc in place. Pt denies pain at this time. Pt denies any needs at this time. Pt educated on use of call light and to call out with needs, verbalized understanding, bed in low locked position for pt safety. Bed alarm on.

## 2020-03-17 LAB
ALBUMIN SERPL-MCNC: 2.3 G/DL (ref 3.4–5)
ANION GAP SERPL CALCULATED.3IONS-SCNC: 16 MMOL/L (ref 3–16)
BASOPHILS ABSOLUTE: 0 K/UL (ref 0–0.2)
BASOPHILS RELATIVE PERCENT: 0.1 %
BUN BLDV-MCNC: 49 MG/DL (ref 7–20)
CALCIUM SERPL-MCNC: 8.5 MG/DL (ref 8.3–10.6)
CHLORIDE BLD-SCNC: 101 MMOL/L (ref 99–110)
CO2: 20 MMOL/L (ref 21–32)
CREAT SERPL-MCNC: 2.3 MG/DL (ref 0.6–1.2)
EOSINOPHILS ABSOLUTE: 0 K/UL (ref 0–0.6)
EOSINOPHILS RELATIVE PERCENT: 0.2 %
GFR AFRICAN AMERICAN: 25
GFR NON-AFRICAN AMERICAN: 21
GLUCOSE BLD-MCNC: 140 MG/DL (ref 70–99)
HCT VFR BLD CALC: 27.4 % (ref 36–48)
HEMOGLOBIN: 8.8 G/DL (ref 12–16)
LACTIC ACID: 0.7 MMOL/L (ref 0.4–2)
LACTIC ACID: 0.8 MMOL/L (ref 0.4–2)
LYMPHOCYTES ABSOLUTE: 0.6 K/UL (ref 1–5.1)
LYMPHOCYTES RELATIVE PERCENT: 4.8 %
MCH RBC QN AUTO: 27.8 PG (ref 26–34)
MCHC RBC AUTO-ENTMCNC: 32 G/DL (ref 31–36)
MCV RBC AUTO: 87 FL (ref 80–100)
MONOCYTES ABSOLUTE: 1.4 K/UL (ref 0–1.3)
MONOCYTES RELATIVE PERCENT: 10.3 %
NEUTROPHILS ABSOLUTE: 11.4 K/UL (ref 1.7–7.7)
NEUTROPHILS RELATIVE PERCENT: 84.6 %
PDW BLD-RTO: 14 % (ref 12.4–15.4)
PHOSPHORUS: 3.8 MG/DL (ref 2.5–4.9)
PLATELET # BLD: 421 K/UL (ref 135–450)
PMV BLD AUTO: 8 FL (ref 5–10.5)
POTASSIUM SERPL-SCNC: 3.5 MMOL/L (ref 3.5–5.1)
RBC # BLD: 3.16 M/UL (ref 4–5.2)
SODIUM BLD-SCNC: 137 MMOL/L (ref 136–145)
WBC # BLD: 13.4 K/UL (ref 4–11)

## 2020-03-17 PROCEDURE — 6370000000 HC RX 637 (ALT 250 FOR IP): Performed by: INTERNAL MEDICINE

## 2020-03-17 PROCEDURE — C9113 INJ PANTOPRAZOLE SODIUM, VIA: HCPCS | Performed by: INTERNAL MEDICINE

## 2020-03-17 PROCEDURE — 99232 SBSQ HOSP IP/OBS MODERATE 35: CPT | Performed by: INTERNAL MEDICINE

## 2020-03-17 PROCEDURE — 6360000002 HC RX W HCPCS: Performed by: INTERNAL MEDICINE

## 2020-03-17 PROCEDURE — 7100000010 HC PHASE II RECOVERY - FIRST 15 MIN: Performed by: INTERNAL MEDICINE

## 2020-03-17 PROCEDURE — 1200000000 HC SEMI PRIVATE

## 2020-03-17 PROCEDURE — 36415 COLL VENOUS BLD VENIPUNCTURE: CPT

## 2020-03-17 PROCEDURE — 0DJ08ZZ INSPECTION OF UPPER INTESTINAL TRACT, VIA NATURAL OR ARTIFICIAL OPENING ENDOSCOPIC: ICD-10-PCS | Performed by: INTERNAL MEDICINE

## 2020-03-17 PROCEDURE — 83605 ASSAY OF LACTIC ACID: CPT

## 2020-03-17 PROCEDURE — 94640 AIRWAY INHALATION TREATMENT: CPT

## 2020-03-17 PROCEDURE — 2580000003 HC RX 258: Performed by: INTERNAL MEDICINE

## 2020-03-17 PROCEDURE — 94669 MECHANICAL CHEST WALL OSCILL: CPT

## 2020-03-17 PROCEDURE — 3609017100 HC EGD: Performed by: INTERNAL MEDICINE

## 2020-03-17 PROCEDURE — 80069 RENAL FUNCTION PANEL: CPT

## 2020-03-17 PROCEDURE — 94761 N-INVAS EAR/PLS OXIMETRY MLT: CPT

## 2020-03-17 PROCEDURE — 7100000011 HC PHASE II RECOVERY - ADDTL 15 MIN: Performed by: INTERNAL MEDICINE

## 2020-03-17 PROCEDURE — 2709999900 HC NON-CHARGEABLE SUPPLY: Performed by: INTERNAL MEDICINE

## 2020-03-17 PROCEDURE — 2700000000 HC OXYGEN THERAPY PER DAY

## 2020-03-17 PROCEDURE — 85025 COMPLETE CBC W/AUTO DIFF WBC: CPT

## 2020-03-17 PROCEDURE — 99152 MOD SED SAME PHYS/QHP 5/>YRS: CPT | Performed by: INTERNAL MEDICINE

## 2020-03-17 PROCEDURE — 6370000000 HC RX 637 (ALT 250 FOR IP): Performed by: PHYSICIAN ASSISTANT

## 2020-03-17 RX ORDER — HYDRALAZINE HYDROCHLORIDE 20 MG/ML
10 INJECTION INTRAMUSCULAR; INTRAVENOUS EVERY 8 HOURS SCHEDULED
Status: DISCONTINUED | OUTPATIENT
Start: 2020-03-17 | End: 2020-03-18

## 2020-03-17 RX ORDER — FENTANYL CITRATE 50 UG/ML
INJECTION, SOLUTION INTRAMUSCULAR; INTRAVENOUS PRN
Status: DISCONTINUED | OUTPATIENT
Start: 2020-03-17 | End: 2020-03-17 | Stop reason: ALTCHOICE

## 2020-03-17 RX ORDER — PANTOPRAZOLE SODIUM 40 MG/10ML
40 INJECTION, POWDER, LYOPHILIZED, FOR SOLUTION INTRAVENOUS 2 TIMES DAILY
Status: DISCONTINUED | OUTPATIENT
Start: 2020-03-17 | End: 2020-03-20 | Stop reason: HOSPADM

## 2020-03-17 RX ORDER — SODIUM CHLORIDE 9 MG/ML
INJECTION, SOLUTION INTRAVENOUS CONTINUOUS
Status: DISPENSED | OUTPATIENT
Start: 2020-03-17 | End: 2020-03-19

## 2020-03-17 RX ORDER — MIDAZOLAM HYDROCHLORIDE 5 MG/ML
INJECTION INTRAMUSCULAR; INTRAVENOUS PRN
Status: DISCONTINUED | OUTPATIENT
Start: 2020-03-17 | End: 2020-03-17 | Stop reason: ALTCHOICE

## 2020-03-17 RX ADMIN — CARBIDOPA AND LEVODOPA 1 TABLET: 25; 100 TABLET ORAL at 22:18

## 2020-03-17 RX ADMIN — PANTOPRAZOLE SODIUM 40 MG: 40 INJECTION, POWDER, FOR SOLUTION INTRAVENOUS at 22:08

## 2020-03-17 RX ADMIN — PANTOPRAZOLE SODIUM 40 MG: 40 INJECTION, POWDER, FOR SOLUTION INTRAVENOUS at 10:45

## 2020-03-17 RX ADMIN — IPRATROPIUM BROMIDE AND ALBUTEROL SULFATE 3 ML: 2.5; .5 SOLUTION RESPIRATORY (INHALATION) at 20:15

## 2020-03-17 RX ADMIN — PROPAFENONE HYDROCHLORIDE 150 MG: 150 TABLET, FILM COATED ORAL at 22:11

## 2020-03-17 RX ADMIN — LAMOTRIGINE 50 MG: 25 TABLET ORAL at 22:10

## 2020-03-17 RX ADMIN — PIPERACILLIN SODIUM AND TAZOBACTAM SODIUM 3.38 G: 3; .375 INJECTION, POWDER, LYOPHILIZED, FOR SOLUTION INTRAVENOUS at 05:41

## 2020-03-17 RX ADMIN — PIPERACILLIN SODIUM AND TAZOBACTAM SODIUM 3.38 G: 3; .375 INJECTION, POWDER, LYOPHILIZED, FOR SOLUTION INTRAVENOUS at 15:30

## 2020-03-17 RX ADMIN — HYDRALAZINE HYDROCHLORIDE 10 MG: 20 INJECTION INTRAMUSCULAR; INTRAVENOUS at 22:11

## 2020-03-17 RX ADMIN — APIXABAN 2.5 MG: 5 TABLET, FILM COATED ORAL at 10:37

## 2020-03-17 RX ADMIN — Medication 10 ML: at 22:11

## 2020-03-17 RX ADMIN — SENNOSIDES AND DOCUSATE SODIUM 2 TABLET: 8.6; 5 TABLET ORAL at 10:37

## 2020-03-17 RX ADMIN — ONDANSETRON HYDROCHLORIDE 4 MG: 2 INJECTION, SOLUTION INTRAMUSCULAR; INTRAVENOUS at 03:12

## 2020-03-17 RX ADMIN — METOPROLOL TARTRATE 50 MG: 50 TABLET, FILM COATED ORAL at 10:38

## 2020-03-17 RX ADMIN — Medication 2 PUFF: at 08:09

## 2020-03-17 RX ADMIN — APIXABAN 2.5 MG: 5 TABLET, FILM COATED ORAL at 22:11

## 2020-03-17 RX ADMIN — METOPROLOL TARTRATE 50 MG: 50 TABLET, FILM COATED ORAL at 22:11

## 2020-03-17 RX ADMIN — LAMOTRIGINE 50 MG: 25 TABLET ORAL at 10:37

## 2020-03-17 RX ADMIN — PIPERACILLIN SODIUM AND TAZOBACTAM SODIUM 3.38 G: 3; .375 INJECTION, POWDER, LYOPHILIZED, FOR SOLUTION INTRAVENOUS at 23:52

## 2020-03-17 RX ADMIN — IPRATROPIUM BROMIDE AND ALBUTEROL SULFATE 3 ML: 2.5; .5 SOLUTION RESPIRATORY (INHALATION) at 15:22

## 2020-03-17 RX ADMIN — SODIUM CHLORIDE: 9 INJECTION, SOLUTION INTRAVENOUS at 15:31

## 2020-03-17 RX ADMIN — CARBIDOPA AND LEVODOPA 1 TABLET: 25; 100 TABLET ORAL at 10:37

## 2020-03-17 RX ADMIN — IPRATROPIUM BROMIDE AND ALBUTEROL SULFATE 3 ML: 2.5; .5 SOLUTION RESPIRATORY (INHALATION) at 08:09

## 2020-03-17 RX ADMIN — HYDRALAZINE HYDROCHLORIDE 10 MG: 20 INJECTION INTRAMUSCULAR; INTRAVENOUS at 10:19

## 2020-03-17 RX ADMIN — IPRATROPIUM BROMIDE AND ALBUTEROL SULFATE 3 ML: 2.5; .5 SOLUTION RESPIRATORY (INHALATION) at 11:04

## 2020-03-17 ASSESSMENT — PAIN DESCRIPTION - DESCRIPTORS: DESCRIPTORS: ACHING;SHARP

## 2020-03-17 ASSESSMENT — PAIN - FUNCTIONAL ASSESSMENT: PAIN_FUNCTIONAL_ASSESSMENT: 0-10

## 2020-03-17 NOTE — PROGRESS NOTES
Pt had very large episode of emesis. Black watery, coffee ground appearance. Prn meds given. While changing and cleaning pt she continues to have nausea/gagging/emeisis. MD notified. Bowel sounds present but distant. Suction provided to help. emesis bag also provided.

## 2020-03-17 NOTE — PROGRESS NOTES
Pt found with NG pulled out. States she itched her nose and then just pulled it out. Attempted to replace X2 with no success. unable to advance past nasal. Pt requesting a break. Will pass on to day shift.

## 2020-03-17 NOTE — H&P
History and Physical / Pre-Sedation Assessment    Patient:  Maikel [place]   :   1940     Intended Procedure:  EGD    HPI: 68-year-old female with a history of anemia, asthma, atrial fibrillation, CKD, diabetes, diverticulosis, GERD, HTN, HLD, and hypothyroidism admitted with a fall complicated by colon pseudo-obstruction and now with hematemesis    Past Medical History:   Diagnosis Date    Anemia, unspecified     neg bone marrow    Asthma     Atrial fibrillation ([address]     Baker's cyst     Blind left eye     Carotid artery stenoses     Carotid stenosis 2002    left    Cervical spinal cord compression ([address] 2010    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-sided sciatica 2016    CKD (chronic kidney disease) stage 3, GFR 30-59 ml/min ([address]     Community acquired pneumonia of left lower lobe of lung ([address] 2018    CRI     Detached retina, left     Diverticulosis     DJD (degenerative joint disease)     Edema     chronic    Fatty liver     GERD (gastroesophageal reflux disease)     Herniated lumbar intervertebral disc     Hx stage 2 sacral decubitus ulcer 2018    Hyperlipidemia     Hypertension     Hypothyroid 2015    Morbid obesity with BMI of 40.0-44.9, adult ([address] 2/10/2017    BETSY treated with BiPAP     Parkinson's disease ([address]     Partial symptomatic epilepsy with complex partial seizures, not intractable, without status epilepticus ([address] 2020    Restless leg syndrome     Restless legs syndrome     Rheumatic fever 1950    2x IN [place]    Sleep apnea     Tremor, essential 2010    Type 2 diabetes mellitus without complication (HCC)     Type II or unspecified type diabetes mellitus without mention of complication, not stated as uncontrolled      Past Surgical History:   Procedure Laterality Date    APPENDECTOMY      CAROTID ENDARTERECTOMY      left    CATARACT REMOVAL      CERVICAL FUSION  2010    CHOLECYSTECTOMY      COLONOSCOPY N/A 3/8/2020    COLONOSCOPY FLEXIBLE W/ DECOMPRESSION performed by [facility] at [facility]      HYSTERECTOMY      JOINT REPLACEMENT Bilateral 2003    KNEE SURGERY      replacement x 2    SHOULDER SURGERY      TONSILLECTOMY      TUBAL LIGATION      VITRECTOMY         Medications reviewed  Prior to Admission medications    Medication Sig Start Date End Date Taking? Authorizing Provider   hydrALAZINE (APRESOLINE) 25 MG tablet Take 1 tablet by mouth 2 times daily 1/27/20  Yes Bakari Vega MD   [facility]) 2.5 MG TABS tablet Take 1 tablet by mouth 2 times daily 1/27/20  Yes Bakari Vega MD   atorvastatin (LIPITOR) 40 MG tablet Take 1 and 1/2 tablets by mouth every evening 1/27/20  Yes Bakari Vega MD   propafenone (RYTHMOL) 150 MG tablet Take 1 tablet by mouth every 8 hours 1/27/20  Yes Bakari Vega MD   NIFEdipine (ADALAT CC) 60 MG extended release tablet Take 1 tablet by mouth daily 1/27/20  Yes Bakari Vega MD   furosemide (LASIX) 40 MG tablet Take 1 tablet by mouth daily 1/27/20  Yes Bakari Vega MD   metoprolol tartrate (LOPRESSOR) 50 MG tablet Take 1 tablet by mouth 2 times daily 1/24/20  Yes David Resendez MD   montelukast (SINGULAIR) 10 MG tablet Take 1 tablet by mouth daily.  1/24/20  Yes David Resendez MD   budesonide (PULMICORT FLEXHALER) 180 MCG/ACT AEPB inhaler Inhale 2 puffs into the lungs 2 times daily 1/24/20  Yes David Resendez MD   tiZANidine (ZANAFLEX) 2 MG tablet TAKE 1-2 TABLETS NIGHTLY AS NEEDED FOR JAW PAIN. 1/20/20  Yes BIJAN Carrera   DULoxetine (CYMBALTA) 60 MG extended release capsule Take 1 capsule by mouth daily 1/15/20  Yes BIJAN Carrera   spironolactone (ALDACTONE) 25 MG tablet Take 1 tablet by mouth daily 11/14/19  Yes BIJAN Carrera   fluticasone [facility]) 50 MCG/ACT nasal spray USE 2 PUFFS IN [facility] NOSTRIL DAILY 6/19/19  Yes BIJAN Carrera   lidocaine (XYLOCAINE) 5 % ointment Apply 1 Applicatorful topically as needed for Pain Apply topically as needed. Yes Historical Provider, MD   ipratropium-albuterol (DUONEB) 0.5-2.5 (3) MG/3ML SOLN nebulizer solution Inhale 3 mLs into the lungs every 6 hours as needed for Shortness of Breath DX: J45.40 2/6/19  Yes BIJAN Thompson   carbidopa-levodopa (SINEMET)  MG per tablet TAKE 1 TABLET BY MOUTH 3 TIMES A DAY 7/30/18  Yes Bubba Wolfe MD   albuterol sulfate HFA (VENTOLIN HFA) 108 (90 Base) MCG/ACT inhaler Inhale 2 puffs into the lungs every 6 hours as needed for Wheezing 7/30/18  Yes Bubba Wolfe MD   lamoTRIgine (LAMICTAL) 25 MG tablet Take 50 mg by mouth 2 times daily    Yes Historical Provider, MD   Multiple Vitamins-Minerals (THERAPEUTIC MULTIVITAMIN-MINERALS) tablet Take 1 tablet by mouth daily   Yes Historical Provider, MD   Alpha-Lipoic Acid 300 MG CAPS Take 300 mg by mouth daily   Yes Historical Provider, MD   aspirin EC 81 MG EC tablet Take 1 tablet by mouth daily. Start taking next week. Indications: OTC 2/25/13  Yes Iram Monte MD   salmeterol (SEREVENT DISKUS) 50 MCG/DOSE diskus inhaler Inhale 1 puff by mouth 2 times a day. 3/16/20   BIJAN Thompson   Handicap Placard MISC by Does not apply route DX: G20  parkinson's disease  Exp: 9/1/2024 9/27/19   BIJAN Thompson   solifenacin (VESICARE) 5 MG tablet Take 1 tablet by mouth daily. 6/25/13 10/29/13  Bubba Wolfe MD        Allergies: Allergies   Allergen Reactions    Levaquin [Levofloxacin]      Pt was given in er and developed hives and redness    Metoclopramide     Phenazopyridine     Pyridium [Phenazopyridine Hcl]     Reglan [Metoclopramide Hcl]     Reglan [Metoclopramide Hcl]      tremors       Nurses notes reviewed and agreed.     Physical Exam:  Vital Signs: BP (!) 153/81   Pulse 107   Temp 98.4 °F (36.9 °C) (Temporal)   Resp 20   Ht 5' 6\" (1.676 m)   Wt 281 lb (127.5 kg)   SpO2 94%   BMI 45.35 kg/m² Airway: Mallampati: II (soft palate, uvula, fauces visible)  Pulmonary:Normal  Cardiac:Normal  Abdomen:Normal    Pre-Procedure Assessment / Plan:  ASA: Class 3 - A patient with severe systemic disease that limits activity but is not incapacitating  Level of Sedation Plan: Moderate sedation  Post Procedure plan: Return to same level of care    I assessed the patient and find that the patient is in satisfactory condition to proceed with the planned procedure and sedation plan. I have explained the risk, benefits, and alternatives to the procedure; the patient understands and agrees to proceed.        Mayra Lane  3/17/2020

## 2020-03-17 NOTE — PROGRESS NOTES
Occupational Therapy/ Physical Therapy  Attempted OT/PT treatment and the pts nurse states to hold OT/PT this morning until MD sees pt . Will attempt again later today as time allows.    Shruti MORELAND/JAIMIE Barajas [address] [place] [address], DPT #[id number]

## 2020-03-17 NOTE — OP NOTE
Román Barraza   3/17/2020  Esophagogastroduodenoscopy  A pre-procedure re-evaluation was performed immediately prior to the procedure. Preprocedure Dx: hematemesis  Postprocedure Dx: esophagitis, retained contents in stomach c/w ileus  Medications: Procedural sedation with Versed & Fentanyl  Complications: None  Estimated Blood Loss: <5cc  Specimens: were not obtained  Recommendation  1. Continue supportive care  2. NG decompression  3. PT/OT  4. PPI BID  5. Monitor Hgb and observe for signs of bleeding  6.  Will follow    Claybon Kawasaki

## 2020-03-17 NOTE — PLAN OF CARE
Problem: Falls - Risk of:  Goal: Will remain free from falls  Description: Will remain free from falls  3/16/2020 2026 by Peggy Donald RN  Outcome: Ongoing  3/16/2020 1100 by Yunior Chadwick RN  Outcome: Ongoing  Goal: Absence of physical injury  Description: Absence of physical injury  3/16/2020 2026 by Peggy Donald RN  Outcome: Ongoing  3/16/2020 1100 by Yunior Chadwick RN  Outcome: Ongoing     Problem: Risk for Impaired Skin Integrity  Goal: Tissue integrity - skin and mucous membranes  Description: Structural intactness and normal physiological function of skin and  mucous membranes.   3/16/2020 2026 by Peggy Donald RN  Outcome: Ongoing  3/16/2020 1100 by Yunior Chadwick RN  Outcome: Ongoing     Problem: Pain:  Goal: Pain level will decrease  Description: Pain level will decrease  3/16/2020 2026 by Peggy Donald RN  Outcome: Ongoing  3/16/2020 1100 by Yunior Chadwick RN  Outcome: Ongoing  Goal: Control of acute pain  Description: Control of acute pain  3/16/2020 2026 by Peggy Donald RN  Outcome: Ongoing  3/16/2020 1100 by Yunior Chadwick RN  Outcome: Ongoing  Goal: Control of chronic pain  Description: Control of chronic pain  3/16/2020 2026 by Peggy Donald RN  Outcome: Ongoing  3/16/2020 1100 by Yunior Chadwick RN  Outcome: Ongoing     Problem: Nutrition  Goal: Optimal nutrition therapy  3/16/2020 2026 by Peggy Donald RN  Outcome: Ongoing  3/16/2020 1100 by Yunior Chadwick RN  Outcome: Ongoing  Goal: Understanding of nutritional guidelines  3/16/2020 2026 by Peggy Donald RN  Outcome: Ongoing  3/16/2020 1100 by Yunior Chadwick RN  Outcome: Ongoing     Problem: ACTIVITY INTOLERANCE/IMPAIRED MOBILITY  Goal: Mobility/activity is maintained at optimum level for patient  3/16/2020 2026 by Peggy Donald RN  Outcome: Ongoing  3/16/2020 1100 by Yunior Chadwick RN  Outcome: Ongoing

## 2020-03-17 NOTE — PROGRESS NOTES
parotid gland on the left and submandibular gland,  suggesting changes from underlying sialoadenitis    Asymmetric nodular soft tissues thickening the hypopharynx on the left. Recommend correlation with direct visualization to rule out mass. Esophagus appears fluid-filled suggesting reflux. Attending Supervising [de-identified] Attestation Statement  The patient is a 70 y.o. female. I have performed a history and physical examination of the patient. I discussed the case with my physician assistant Nessa Cabrera PA-C    I reviewed the patient's Past Medical History, Past Surgical History, Medications, and Allergies. Physical Exam:  Vitals:    03/20/20 0312 03/20/20 0746 03/20/20 0930 03/20/20 1446   BP: (!) 147/70  (!) 169/72 (!) 144/70   Pulse: 75  85 76   Resp: 18 18 16 16   Temp: 98 °F (36.7 °C)  97.8 °F (36.6 °C) 98.1 °F (36.7 °C)   TempSrc: Oral  Oral Oral   SpO2: 95% 99% 98% 99%   Weight:       Height:           Physical Examination: General appearance - alert, well appearing, and in no distress  Mental status - alert, oriented to person, place, and time  Eyes - pupils equal and reactive, extraocular eye movements intact  Neck - supple, no significant adenopathy  Chest - clear to auscultation, no wheezes, rales or rhonchi, symmetric air entry  Heart - normal rate, regular rhythm, normal S1, S2, no murmurs, rubs, clicks or gallops  Abdomen - soft, nontender, nondistended, no masses or organomegaly  Extremities - no pedal edema noted     Impression: 47-year-old female with a history of anemia, asthma, atrial fibrillation, CKD, diabetes, diverticulosis, GERD, HTN, HLD, and hypothyroidism admitted with a fall complicated by colon pseudo-obstruction now c/b hematemesis. Recommendation:  1. Continue supportive care  2. NPO, IVF  3. Minimize narcotics  4. PPI BID  5. Monitor Hgb and observe for signs of bleeding  6. PT/OT  7.  Will consider EGD and NG tube replacement      Tina Motley, JACOBO  10:57 AM 3/17/2020                      I reviewed and agree with the findings and plan documented in her note with the appropriate changes made to it.     Electronically signed by Billy Aguirre MD on 3/25/20 at 3:51 PM EDT          (O) [phone]  Dwayne Daugherty

## 2020-03-17 NOTE — PROGRESS NOTES
Progress Note    Admit Date:  2/29/2020    Admitted with abdominal pain and distention. She has pseudoobstruction. Patient now having multiple loose bowel movements, abdomen distended again today     New problem with significant left neck swelling on 3/16. tender, firm swelling . Tata Duncan CT neck showed sialadenitis   No fevers or chills  + dysphagia      NG placed last night , with significant output. NG came out this AM, could Not Be replaced       Subjective:  Ms. Varghese Scales complains of swelling and pain and redness in her left neck. Significantly tender. Better than yesterday   Abd distended, +loose stools  No fever      Objective:   Patient Vitals for the past 4 hrs:   BP Temp Temp src Pulse Resp SpO2   03/17/20 0900 (!) 150/102 99.8 °F (37.7 °C) Axillary 98 18 98 %   03/17/20 0812 -- -- -- -- 18 96 %        Intake/Output Summary (Last 24 hours) at 3/17/2020 0445  Last data filed at 3/17/2020 0430  Gross per 24 hour   Intake 360 ml   Output 1550 ml   Net -1190 ml     Physical Exam:    General: elderly female. Awake, alert and oriented. ill appearing . No distress  Mucous Membranes:  Pink , anicteric  Neck: No JVD, no carotid bruit, no thyromegaly  Left neck is swollen tender,  red and erythematous,  about 1 x 2 cm  Chest:  Clear to auscultation bilaterally, no added sounds  Right Lower rib cage pain   Cardiovascular:  RRR S1S2 heard, no murmurs or gallops  Abdomen:  Soft, obese,  slightly improved distention, non tender, no organomegaly, BS ++  Extremities: No edema or cyanosis.  Distal pulses well felt  Left UE subcut edema resolving  Neurological : grossly normal    Scheduled Meds:   hydrALAZINE  10 mg Intravenous 3 times per day    piperacillin-tazobactam  3.375 g Intravenous Q8H    carbidopa-levodopa  1 tablet Oral TID    sennosides-docusate sodium  2 tablet Oral Daily    [Held by provider] linaclotide  145 mcg Oral QAM AC    bisacodyl  10 mg Rectal Daily    apixaban  2.5 mg Oral BID    [Held by provider] DULoxetine  60 mg Oral Daily    [Held by provider] atorvastatin  20 mg Oral Nightly    lamoTRIgine  50 mg Oral BID    metoprolol tartrate  50 mg Oral BID    [Held by provider] montelukast  10 mg Oral Nightly    [Held by provider] NIFEdipine  60 mg Oral Daily    propafenone  150 mg Oral 3 times per day    sodium chloride flush  10 mL Intravenous 2 times per day    lidocaine  1 patch Transdermal Daily    ipratropium-albuterol  1 vial Inhalation 4x daily    fluticasone  2 puff Inhalation BID       Continuous Infusions:   sodium chloride 100 mL/hr at 03/16/20 1834    dextrose         PRN Meds:  oxyCODONE-acetaminophen **OR** oxyCODONE-acetaminophen, glucose, dextrose, glucagon (rDNA), dextrose, acetaminophen **OR** acetaminophen, albuterol sulfate HFA, sodium chloride flush, promethazine **OR** ondansetron      Data:  CBC:   Recent Labs     03/16/20  0524 03/17/20  0534   WBC 15.7* 13.4*   HGB 8.9* 8.8*   HCT 29.0* 27.4*   MCV 86.6 87.0    421     BMP:   Recent Labs     03/15/20  0537 03/16/20  0524 03/17/20  0534    138 137   K 4.5 3.5 3.5    104 101   CO2 19* 19* 20*   PHOS 3.3 2.8 3.8   BUN 27* 32* 49*   CREATININE 1.5* 1.4* 2.3*     LIVER PROFILE:   No results for input(s): AST, ALT, LIPASE, BILIDIR, BILITOT, ALKPHOS in the last 72 hours. 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 Recommend correlation with direct visualization to rule out mass. Esophagus appears fluid-filled suggesting reflux.          XR ABDOMEN (2 VIEWS)   Final Result   Overall worsening of diffuse intestinal distension         XR ABDOMEN (KUB) (SINGLE AP VIEW) Final Result   Stable intestinal distension status post rectal tube removal         XR CHEST PORTABLE   Final Result   Cardiomegaly with interval decrease in size of bilateral pleural effusions         XR ABDOMEN (KUB) (SINGLE AP VIEW)   Final Result   1. Colonic tube remains in place. 2. Diffuse colonic distension, slightly improved since 3/9/2020 as described   above. 3. No obvious free air or pneumatosis on supine view. XR ABDOMEN (KUB) (SINGLE AP VIEW)   Final Result   Slight improvement following insertion of rectal tube. Persisting generalized ileus pattern. Distal colonic obstruction remains a   differential consideration. XR ABDOMEN (KUB) (SINGLE AP VIEW)   Final Result   Probable worsening of diffuse gaseous distention of bowel, either ileus   versus very distal obstruction, favoring the former. Continued follow-up is   advised. XR ABDOMEN (2 VIEWS)   Final Result   Distension of the colon, cecal diameter 14 mm      The findings were sent to the Radiology Results [address] at 12:53   pm on 3/6/2020to be communicated to a licensed caregiver. XR ABDOMEN (KUB) (SINGLE AP VIEW)   Final Result   Unchanged colonic ileus over the past 18 hours         XR ABDOMEN (KUB) (SINGLE AP VIEW)   Final Result   Gaseous distention of the colon without obstruction, possibly an adynamic   ileus or [place]'s syndrome. CT ABDOMEN PELVIS WO CONTRAST Additional Contrast? None   Final Result   Marked distension of the ascending and transverse colon with mild gradual   decompression distally. Air-fluid levels are present throughout. 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 As the distal rectum/anus is outside the field of view, recommend rectal exam   to ensure there is no distal mass. Colonic diverticulosis. Small bilateral effusions, increased since 02/29/2020.          XR CHEST PORTABLE   Final Result   New

## 2020-03-18 PROBLEM — E44.0 MODERATE PROTEIN-CALORIE MALNUTRITION (HCC): Status: ACTIVE | Noted: 2020-03-18

## 2020-03-18 LAB
ANION GAP SERPL CALCULATED.3IONS-SCNC: 16 MMOL/L (ref 3–16)
BASOPHILS ABSOLUTE: 0 K/UL (ref 0–0.2)
BASOPHILS RELATIVE PERCENT: 0 %
BUN BLDV-MCNC: 41 MG/DL (ref 7–20)
CALCIUM SERPL-MCNC: 8.2 MG/DL (ref 8.3–10.6)
CHLORIDE BLD-SCNC: 104 MMOL/L (ref 99–110)
CO2: 19 MMOL/L (ref 21–32)
CREAT SERPL-MCNC: 1.8 MG/DL (ref 0.6–1.2)
EOSINOPHILS ABSOLUTE: 0.3 K/UL (ref 0–0.6)
EOSINOPHILS RELATIVE PERCENT: 2 %
GFR AFRICAN AMERICAN: 33
GFR NON-AFRICAN AMERICAN: 27
GLUCOSE BLD-MCNC: 107 MG/DL (ref 70–99)
GLUCOSE BLD-MCNC: 111 MG/DL (ref 70–99)
GLUCOSE BLD-MCNC: 119 MG/DL (ref 70–99)
GLUCOSE BLD-MCNC: 142 MG/DL (ref 70–99)
HCT VFR BLD CALC: 27.4 % (ref 36–48)
HEMOGLOBIN: 8.3 G/DL (ref 12–16)
LYMPHOCYTES ABSOLUTE: 0.4 K/UL (ref 1–5.1)
LYMPHOCYTES RELATIVE PERCENT: 3 %
MAGNESIUM: 1.8 MG/DL (ref 1.8–2.4)
MCH RBC QN AUTO: 26.4 PG (ref 26–34)
MCHC RBC AUTO-ENTMCNC: 30.2 G/DL (ref 31–36)
MCV RBC AUTO: 87.5 FL (ref 80–100)
MONOCYTES ABSOLUTE: 0.3 K/UL (ref 0–1.3)
MONOCYTES RELATIVE PERCENT: 2 %
NEUTROPHILS ABSOLUTE: 12.1 K/UL (ref 1.7–7.7)
NEUTROPHILS RELATIVE PERCENT: 93 %
PDW BLD-RTO: 14.6 % (ref 12.4–15.4)
PERFORMED ON: ABNORMAL
PLATELET # BLD: 402 K/UL (ref 135–450)
PLATELET SLIDE REVIEW: ADEQUATE
PMV BLD AUTO: 7.7 FL (ref 5–10.5)
POTASSIUM REFLEX MAGNESIUM: 3.1 MMOL/L (ref 3.5–5.1)
RBC # BLD: 3.13 M/UL (ref 4–5.2)
SLIDE REVIEW: ABNORMAL
SODIUM BLD-SCNC: 139 MMOL/L (ref 136–145)
WBC # BLD: 13 K/UL (ref 4–11)

## 2020-03-18 PROCEDURE — 6370000000 HC RX 637 (ALT 250 FOR IP): Performed by: PHYSICIAN ASSISTANT

## 2020-03-18 PROCEDURE — 2580000003 HC RX 258: Performed by: INTERNAL MEDICINE

## 2020-03-18 PROCEDURE — 6370000000 HC RX 637 (ALT 250 FOR IP): Performed by: INTERNAL MEDICINE

## 2020-03-18 PROCEDURE — 6360000002 HC RX W HCPCS: Performed by: INTERNAL MEDICINE

## 2020-03-18 PROCEDURE — C9113 INJ PANTOPRAZOLE SODIUM, VIA: HCPCS | Performed by: INTERNAL MEDICINE

## 2020-03-18 PROCEDURE — 94761 N-INVAS EAR/PLS OXIMETRY MLT: CPT

## 2020-03-18 PROCEDURE — 97112 NEUROMUSCULAR REEDUCATION: CPT

## 2020-03-18 PROCEDURE — 94640 AIRWAY INHALATION TREATMENT: CPT

## 2020-03-18 PROCEDURE — 94669 MECHANICAL CHEST WALL OSCILL: CPT

## 2020-03-18 PROCEDURE — 36415 COLL VENOUS BLD VENIPUNCTURE: CPT

## 2020-03-18 PROCEDURE — 2700000000 HC OXYGEN THERAPY PER DAY

## 2020-03-18 PROCEDURE — 97535 SELF CARE MNGMENT TRAINING: CPT

## 2020-03-18 PROCEDURE — 83735 ASSAY OF MAGNESIUM: CPT

## 2020-03-18 PROCEDURE — 97530 THERAPEUTIC ACTIVITIES: CPT

## 2020-03-18 PROCEDURE — 99232 SBSQ HOSP IP/OBS MODERATE 35: CPT | Performed by: PHYSICIAN ASSISTANT

## 2020-03-18 PROCEDURE — 2580000003 HC RX 258: Performed by: PHYSICIAN ASSISTANT

## 2020-03-18 PROCEDURE — 2580000003 HC RX 258

## 2020-03-18 PROCEDURE — 97110 THERAPEUTIC EXERCISES: CPT

## 2020-03-18 PROCEDURE — 85025 COMPLETE CBC W/AUTO DIFF WBC: CPT

## 2020-03-18 PROCEDURE — 1200000000 HC SEMI PRIVATE

## 2020-03-18 PROCEDURE — 80048 BASIC METABOLIC PNL TOTAL CA: CPT

## 2020-03-18 RX ORDER — HYDRALAZINE HYDROCHLORIDE 25 MG/1
25 TABLET, FILM COATED ORAL EVERY 12 HOURS SCHEDULED
Status: DISCONTINUED | OUTPATIENT
Start: 2020-03-18 | End: 2020-03-20 | Stop reason: HOSPADM

## 2020-03-18 RX ORDER — SODIUM CHLORIDE 9 MG/ML
INJECTION, SOLUTION INTRAVENOUS
Status: COMPLETED
Start: 2020-03-18 | End: 2020-03-18

## 2020-03-18 RX ADMIN — Medication 2 PUFF: at 19:40

## 2020-03-18 RX ADMIN — PROPAFENONE HYDROCHLORIDE 150 MG: 150 TABLET, FILM COATED ORAL at 21:32

## 2020-03-18 RX ADMIN — PANTOPRAZOLE SODIUM 40 MG: 40 INJECTION, POWDER, FOR SOLUTION INTRAVENOUS at 08:19

## 2020-03-18 RX ADMIN — CARBIDOPA AND LEVODOPA 1 TABLET: 25; 100 TABLET ORAL at 14:05

## 2020-03-18 RX ADMIN — Medication 10 ML: at 21:33

## 2020-03-18 RX ADMIN — MONTELUKAST SODIUM 10 MG: 10 TABLET, COATED ORAL at 21:32

## 2020-03-18 RX ADMIN — POTASSIUM BICARBONATE 40 MEQ: 782 TABLET, EFFERVESCENT ORAL at 16:10

## 2020-03-18 RX ADMIN — SODIUM CHLORIDE 250 ML: 9 INJECTION, SOLUTION INTRAVENOUS at 16:22

## 2020-03-18 RX ADMIN — ATORVASTATIN CALCIUM 20 MG: 10 TABLET, FILM COATED ORAL at 21:32

## 2020-03-18 RX ADMIN — PIPERACILLIN SODIUM AND TAZOBACTAM SODIUM 3.38 G: 3; .375 INJECTION, POWDER, LYOPHILIZED, FOR SOLUTION INTRAVENOUS at 16:10

## 2020-03-18 RX ADMIN — PANTOPRAZOLE SODIUM 40 MG: 40 INJECTION, POWDER, FOR SOLUTION INTRAVENOUS at 21:33

## 2020-03-18 RX ADMIN — SODIUM CHLORIDE: 9 INJECTION, SOLUTION INTRAVENOUS at 16:12

## 2020-03-18 RX ADMIN — IPRATROPIUM BROMIDE AND ALBUTEROL SULFATE 3 ML: 2.5; .5 SOLUTION RESPIRATORY (INHALATION) at 11:25

## 2020-03-18 RX ADMIN — IPRATROPIUM BROMIDE AND ALBUTEROL SULFATE 3 ML: 2.5; .5 SOLUTION RESPIRATORY (INHALATION) at 19:40

## 2020-03-18 RX ADMIN — OXYCODONE HYDROCHLORIDE AND ACETAMINOPHEN 1 TABLET: 5; 325 TABLET ORAL at 21:46

## 2020-03-18 RX ADMIN — SODIUM CHLORIDE: 9 INJECTION, SOLUTION INTRAVENOUS at 08:27

## 2020-03-18 RX ADMIN — Medication 10 ML: at 08:20

## 2020-03-18 RX ADMIN — Medication 2 PUFF: at 07:55

## 2020-03-18 RX ADMIN — HYDRALAZINE HYDROCHLORIDE 10 MG: 20 INJECTION INTRAMUSCULAR; INTRAVENOUS at 14:05

## 2020-03-18 RX ADMIN — CARBIDOPA AND LEVODOPA 1 TABLET: 25; 100 TABLET ORAL at 21:32

## 2020-03-18 RX ADMIN — POTASSIUM BICARBONATE 40 MEQ: 782 TABLET, EFFERVESCENT ORAL at 21:32

## 2020-03-18 RX ADMIN — LAMOTRIGINE 50 MG: 25 TABLET ORAL at 21:32

## 2020-03-18 RX ADMIN — PROPAFENONE HYDROCHLORIDE 150 MG: 150 TABLET, FILM COATED ORAL at 14:05

## 2020-03-18 RX ADMIN — HYDRALAZINE HYDROCHLORIDE 10 MG: 20 INJECTION INTRAMUSCULAR; INTRAVENOUS at 06:19

## 2020-03-18 RX ADMIN — BISACODYL 10 MG: 10 SUPPOSITORY RECTAL at 10:18

## 2020-03-18 RX ADMIN — IPRATROPIUM BROMIDE AND ALBUTEROL SULFATE 3 ML: 2.5; .5 SOLUTION RESPIRATORY (INHALATION) at 07:55

## 2020-03-18 RX ADMIN — PIPERACILLIN SODIUM AND TAZOBACTAM SODIUM 3.38 G: 3; .375 INJECTION, POWDER, LYOPHILIZED, FOR SOLUTION INTRAVENOUS at 08:20

## 2020-03-18 RX ADMIN — APIXABAN 2.5 MG: 5 TABLET, FILM COATED ORAL at 21:32

## 2020-03-18 ASSESSMENT — PAIN DESCRIPTION - PAIN TYPE: TYPE: ACUTE PAIN

## 2020-03-18 ASSESSMENT — PAIN DESCRIPTION - LOCATION: LOCATION: ABDOMEN

## 2020-03-18 ASSESSMENT — PAIN SCALES - GENERAL: PAINLEVEL_OUTOF10: 4

## 2020-03-18 NOTE — PROGRESS NOTES
Pts. NG removed at this time. Pt. Started on clear liquid diet. Will continue to monitor. Call light is within reach.   Ramone Shell RN

## 2020-03-18 NOTE — CARE COORDINATION
INTERDISCIPLINARY PLAN OF CARE CONFERENCE    Date/Time: 3/18/2020 3:34 PM  Completed by: Yessenia Francois, Case Management      Patient Name:  Neal Pelaez  YOB: 1940  Admitting Diagnosis: Intractable pain [R52]  Intractable pain [R52]     Admit Date/Time:  2/29/2020  4:03 PM    Chart reviewed. Interdisciplinary team met to discuss patient progress and discharge plans. Disciplines included Case Management, Nursing, and Dietitian. Current Status: IP 03/01/2020    PT/OT recommendation: SNF    Anticipated Discharge Date: 1-2 days-(Advancing diet-clears)   Expected D/C Disposition:  Skilled nursing facility  Confirmed plan with patient:Yes  Discharge Plan Comments: The Plan for Transition of Care is related to the following treatment goals: SNF- [facility]. Gus Zhong ([facility]) is following and ready to accept pt when ready for DC. Still has ABD distention. Started on clears with the hope to advance vs alternate methods of nutrition.      Home O2 in place on admit: No  Pt informed of need to bring portable home O2 tank on day of discharge for nursing to connect prior to leaving:  No  Verbalized agreement/Understanding:  No

## 2020-03-18 NOTE — PROGRESS NOTES
Nutrition Assessment    Type and Reason for Visit: Reassess    Nutrition Recommendations:   1. Consider TPN d/t exhibiting s/s of Moderate Protein Calorie Malnutrition : Clinimix 5/20 goal 70 ml x 24hr + 250 ml Lipids bi-weekly to provide : 1478 kcal, 84 gr Pro   2. Continue Clear Liquids as tolerated (this is  insufficient in Protein)     Nutrition Assessment:  Pt declining from a nutritional standpoint AEB she was placed on NPO for past 5 days r/t esophagitis and gastro paresis with NG tube placed for suction she has physical s/s of moderate PCM with mild muscle and fat loss. Remains at risk for further nutritional compromise r/t she can have clear liquids but she has not been able to tolerate m oral medications in the last 24 hours . Will recommend TPN   Malnutrition Assessment:  · Malnutrition Status: Meets the criteria for moderate malnutrition  · Context: Acute illness or injury  · Findings of the 6 clinical characteristics of malnutrition (Minimum of 2 out of 6 clinical characteristics is required to make the diagnosis of moderate or severe Protein Calorie Malnutrition based on AND/ASPEN Guidelines):  1. Energy Intake-Less than or equal to 50% of estimated energy requirement, Greater than or equal to 5 days    2. Weight Loss-No significant weight loss, in 6 months  3. Fat Loss-Mild subcutaneous fat loss, Orbital  4. Muscle Loss-Mild muscle mass loss, Temples (temporalis muscle), Scapula (trapezius), Interosseous  5. Fluid Accumulation-Moderate to severe fluid accumulation, Extremities  6.   Strength-Not measured    Nutrition Risk Level: High    Nutrient Needs:  · Estimated Daily Total Kcal: 3975-6225 kcals based on 10-12 kcals/kg/CBW  · Estimated Daily Protein (g): 130-142 g protein based on 2.2-2.4 g/kg/CBW  · Estimated Daily Total Fluid (ml/day): 1500 mL(per MD)    Nutrition Diagnosis:   · Problem: Inadequate oral intake  · Etiology: related to Alteration in GI function, Insufficient energy/nutrient consumption     Signs and symptoms:  as evidenced by Diet history of poor intake, GI abnormality, Lab values    Objective Information:  · Nutrition-Focused Physical Findings: pt was sitting up partially in bed with her  at the bedside; she has been NPO x 5 days d/t esophagitis and Gastroparesis; she is obese but weight is currently 30# greater than her reported weight of 255#; she has noted muscle wasting NG tube draining brown fluid ; reported a poor 2 weeks prior to admission ; does not tolerat Meds pO ;  her hands had noted tremors d/t parkinson's ;  redness noted around her neck area reports she has an infection and it is very tender and sore   · Wound Type: Pressure Ulcer, Stage II(R buttocks)  · Current Nutrition Therapies:  · Oral Diet Orders: Clear Liquid   · Oral Diet intake: Unable to assess  · Oral Nutrition Supplement (ONS) Orders: None  · ONS intake: (none)  · Anthropometric Measures:  · Ht: 5' 6\" (167.6 cm)   · Current Body Wt: 81 lb (36.7 kg)  · Admission Body Wt: 259 lb (117.5 kg)(Stated)  · Usual Body Wt: (258-259# per pt (weighed daily at home))  · % Weight Change:  ,  down < 1% in 5days   · Ideal Body Wt: 130 lb (59 kg), % Ideal Body 217%  · BMI Classification: BMI > or equal to 40.0 Obese Class III(45.7 BMI)    Nutrition Interventions:   Continue current diet, Start Parenteral Nutrition  Continued Inpatient Monitoring, Coordination of Care, Coordination of Community Care    Nutrition Evaluation:   · Evaluation: Goals set   · Goals: pt will have her nutritionals needs met per TPN to allow her GI tract to retirn function and will be advanced to PO diet .      · Monitoring: Nutrition Progression, PN Intake, Diet Tolerance, Pertinent Labs, Chewing/Swallowing, Weight, Monitor Bowel Function      Electronically signed by Bari Gill RD, LD on 3/18/20 at 2:58 PM EDT    Contact Number: [phone]

## 2020-03-18 NOTE — PROGRESS NOTES
Pt. Transferred from chair to bed using left. Pt. Tolerated. Pt. denies further needs at this time. Will continue to monitor. Call light is within reach.   Nayeli Ulloa RN

## 2020-03-18 NOTE — PLAN OF CARE
Nutrition Problem: Inadequate oral intake  Intervention: Food and/or Nutrient Delivery: Continue current diet, Start Parenteral Nutrition  Nutritional Goals: pt will have her nutritionals needs met per TPN to allow her GI tract to retirn function and will be advanced to PO diet .

## 2020-03-18 NOTE — PROGRESS NOTES
4 Eyes Skin Assessment     The patient is being assess for   Shift Handoff    I agree that 2 RN's have performed a thorough Head to Toe Skin Assessment on the patient. ALL assessment sites listed below have been assessed. Areas assessed by both nurses:   [x]   Head, Face, and Ears   [x]   Shoulders, Back, and Chest, Abdomen  [x]   Arms, Elbows, and Hands   [x]   Coccyx, Sacrum, and Ischium  [x]   Legs, Feet, and Heels        Scattered bruising. Stage II to buttocks. Mepilex applied. Measurements taken & put in flowsheet. **SHARE this note so that the co-signing nurse is able to place an eSignature**    Co-signer eSignature: Electronically signed by Julian Hewitt RN on 3/18/20 at 1:26 AM EDT    Does the Patient have Skin Breakdown?   Yes [facility] WOUND CARE was Initiated documentation include the Annmarie-wound, Wound Assessment, Measurements, Dressing Treatment, Drainage, and Color\",          Vineet Prevention initiated:  Yes   Wound Care Orders initiated:  Yes      [id number]  nurse consulted for Pressure Injury (Stage 3,4, Unstageable, DTI, NWPT, Complex wounds)and New or Established Ostomies:  No      Primary Nurse eSignature: Electronically signed by Titi Reynolds RN on 3/18/20 at 1:16 AM EDT

## 2020-03-18 NOTE — PROGRESS NOTES
Cardiomegaly with interval decrease in size of bilateral pleural effusions         XR ABDOMEN (KUB) (SINGLE AP VIEW)   Final Result   1. Colonic tube remains in place. 2. Diffuse colonic distension, slightly improved since 3/9/2020 as described   above. 3. No obvious free air or pneumatosis on supine view. XR ABDOMEN (KUB) (SINGLE AP VIEW)   Final Result   Slight improvement following insertion of rectal tube. Persisting generalized ileus pattern. Distal colonic obstruction remains a   differential consideration. XR ABDOMEN (KUB) (SINGLE AP VIEW)   Final Result   Probable worsening of diffuse gaseous distention of bowel, either ileus   versus very distal obstruction, favoring the former. Continued follow-up is   advised. XR ABDOMEN (2 VIEWS)   Final Result   Distension of the colon, cecal diameter 14 mm      The findings were sent to the Radiology Results [address] at 12:53   pm on 3/6/2020to be communicated to a licensed caregiver. XR ABDOMEN (KUB) (SINGLE AP VIEW)   Final Result   Unchanged colonic ileus over the past 18 hours         XR ABDOMEN (KUB) (SINGLE AP VIEW)   Final Result   Gaseous distention of the colon without obstruction, possibly an adynamic   ileus or Haworth's syndrome. CT ABDOMEN PELVIS WO CONTRAST Additional Contrast? None   Final Result   Marked distension of the ascending and transverse colon with mild gradual   decompression distally. Air-fluid levels are present throughout. 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 As the distal rectum/anus is outside the field of view, recommend rectal exam   to ensure there is no distal mass. Colonic diverticulosis. Small bilateral effusions, increased since 02/29/2020. XR CHEST PORTABLE   Final Result   New bilateral pleural effusions.          CT CHEST WO CONTRAST   Final Result   Dependent airspace disease bilaterally,

## 2020-03-18 NOTE — PROGRESS NOTES
Pt. In bed awake. No signs of distress noted. Attempted to give PO medications with small amount of water. Pt. Gagging on half of eliquis and spit it back up. Will hold all other PO medications at this time. Pt. Assessment completed- see flow sheet. Pt. denies further needs at this time. Will continue to monitor. Call light is within reach.   Ramone Shell RN

## 2020-03-18 NOTE — PROGRESS NOTES
PROGRESS NOTE  S:79 yrs Patient  admitted on 2/29/2020 with Intractable pain [R52]  Intractable pain [R52] . Today she complains of Abdominal Pain and Dry Mouth    Exam:   Vitals:    03/18/20 0817   BP: (!) 148/61   Pulse: 88   Resp: 18   Temp: 98.4 °F (36.9 °C)   SpO2: 96%      General appearance: alert, appears stated age, cooperative, fatigued and morbidly obese  HEENT: Oropharynx clear, no lesions  Neck: supple, symmetrical, trachea midline and left-sided sialoadenitis  Lungs: clear to auscultation bilaterally  Heart: regular rate and rhythm, S1, S2 normal, no murmur, click, rub or gallop  Abdomen: abnormal findings:  distended, obese and tenderness mild in the epigastrium  Extremities: extremities normal, atraumatic, no cyanosis or edema     Medications: Reviewed    Labs:  CBC:   Recent Labs     03/16/20  0524 03/17/20  0534 03/18/20  0654   WBC 15.7* 13.4* 13.0*   HGB 8.9* 8.8* 8.3*   HCT 29.0* 27.4* 27.4*   MCV 86.6 87.0 87.5    421 402     BMP:   Recent Labs     03/16/20  0524 03/17/20  0534 03/18/20  0654    137 139   K 3.5 3.5 3.1*    101 104   CO2 19* 20* 19*   PHOS 2.8 3.8  --    BUN 32* 49* 41*   CREATININE 1.4* 2.3* 1.8*     LIVER PROFILE: No results for input(s): AST, ALT, LIPASE, PROT, BILIDIR, BILITOT, ALKPHOS in the last 72 hours. Invalid input(s): AMYLASE,  ALB  PT/INR: No results for input(s): INR in the last 72 hours. Invalid input(s): PT    PROCEDURE:   EGD:  PREOPERATIVE DIAGNOSES:  1.  GI bleed. 2.  Hematemesis. POSTOPERATIVE DIAGNOSES:  1.  Moderate esophagitis, likely reflux. 2.  Retained contents in the stomach concerning for ileus versus  gastroparesis. 3.  Otherwise normal EGD. Attending Supervising [de-identified] Attestation Statement  The patient is a 70 y.o. female. I have performed a history and physical examination of the patient.  I discussed the case with my physician assistant Yolis Tran PA-C    I reviewed the patient's Past Medical History, Past Surgical History, Medications, and Allergies. Physical Exam:  Vitals:    03/18/20 0758 03/18/20 0817 03/18/20 1125 03/18/20 1404   BP:  (!) 148/61  (!) 146/74   Pulse:  88  102   Resp:  18  18   Temp:  98.4 °F (36.9 °C)  97.7 °F (36.5 °C)   TempSrc:  Oral  Oral   SpO2: (!) 87% 96% 90% 96%   Weight:       Height:           Physical Examination: General appearance - alert, well appearing, and in no distress  Mental status - alert, oriented to person, place, and time  Eyes - pupils equal and reactive, extraocular eye movements intact  Neck - supple, no significant adenopathy  Chest - clear to auscultation, no wheezes, rales or rhonchi, symmetric air entry  Heart - normal rate, regular rhythm, normal S1, S2, no murmurs, rubs, clicks or gallops  Abdomen - distended  Extremities - pedal edema 2 +     Impression: 62-year-old female with a history of anemia, asthma, atrial fibrillation, CKD, diabetes, diverticulosis, GERD, HTN, HLD, and hypothyroidism admitted with a fall complicated by colon pseudo-obstruction now c/b esophagitis and gastroparesis. NG tube with suction placed yesterday. Recommendation:  1. Continue Pantoprazole BID x 8 weeks  2. Ok to remove NG tube   3. Start clear liquid diet  4. Aggressive PT/OT  5. Monitor Hgb and observe for signs of bleeding  6. Minimize narcotics  7. Will follow    Jordan Artis PA-C  10:21 AM 3/18/2020                      62-year-old female with a history of anemia, asthma, atrial fibrillation, CKD, diabetes, diverticulosis, GERD, HTN, HLD, and hypothyroidism admitted with a fall complicated by colon pseudo-obstruction now c/b esophagitis and gastroparesis. PPI BID. D/C NG tube. Start clear diet and advance as tolerated. Aggressive PT/OT. Encourage nutirtional supplements.     Denis Jacinto MD          [phone]  [phone]

## 2020-03-18 NOTE — PROGRESS NOTES
bed with spouse present  Pt agreeable to this PT tx. Pain   Yes  Location: R rib pain with rolling  Rating:  moderate/10  Pain Medicine Status: Pain med requested and RN notified     Bed Mobility   Supine to Sit:   Max A of 2  Sit to Supine:  Not Tested  Rolling:   Not Tested   Scooting: Max A of 2    Transfer Training     Sit to stand: Mod A of 2 to STEDY  Stand to sit:   Mod A of 2 from STEDY  Bed to Chair:  Total A via Stedy with use of STEDY with pillowed at the knee pad     Gait Training gait deferred due to fatigue and pt having continuous liquid bowel movements with mobility; pt ambulated 0 ft. Distance:  N/A ft  Deviations (firm surface/linoleum): N/A   Assistive Device Used:  N/A  Level of Assist: N/A  Comment:     Stair Training deferred, pt unsafe/not appropriate to complete stairs at this time    Therapeutic Exercise all completed bilaterally unless indicated for 10 reps x 1 set  Ankle pumps and LAQ    Balance  Static Sitting:  Fair    Dynamic Sitting:  Fair -  Static Standing: Poor +   Tolerance: 1 minute  X 2nd attempt 5 min with patient stooping over the Stedy bar   Dynamic Standing: Not tested    Patient Education      Role of PT, POC, Discharge recommendations, safety awareness, transfer techniques, HEP and calling for assist with mobility. Pt educated on MD's instructions that pt be up 3x/day. Positioning Needs       Pt instructed and educated on use of call light to call for assist if desiring to get up or change position, call light handed to pt and needs within reach, Pt returned to bed, pillows placed for support/comfort, heels floated on pillow  and RN in room . Activity Tolerance   Pt completed therapy session with Pain noted with rolling and transfers  SOB noted with bed mobility and transfers, resolves with rest breaks. SpO2:   HR:  BP:     Other  Pt incontinent of loose stool 2x during session, pericare provided with max A.  Pt continued to have loose stool with all bed

## 2020-03-18 NOTE — PROGRESS NOTES
Occupational Therapy Daily Treatment Note    Unit: [facility]  Date:  3/18/2020  Patient Name:    Neda Jay  Admitting diagnosis:  Intractable pain [R52]  Intractable pain [R52]  Admit Date:  2/29/2020  Precautions/Restrictions:  fall risk, IV, bed/chair alarm, supplemental O2 (3 L) and purewick catheter  . Discharge Recommendations: SNF  DME needs for discharge: defer to facility       Therapy recommendations for staff:   Assist of 2 (moderate assist) with use of STEDY for all transfers to/from BSC/chair- or max move     AM-PAC Score: 12  Home Health S4 Level: NA       Treatment Time:  6649-9360   Treatment number:  8  Total Treatment Time: 60   minutes      Subjective:  Pt in the bed and willing to work with OT     Pain   Yes  Rating: moderate  Location: left Lower extremity   Pain Medicine Status: RN notified      Bed Mobility: pt fatigued from multiple rolling to the right and left to be changed. Supine to Sit:          max assist of two   Sit to Supine:          NT   Rolling:                    NT  Scooting: Max A of 2    Transfer Training:  Pt stood with mod assist of two to the stedy. The pt had a bowel movement with standing. The pt stood for approx one min then on second stand the pt stood for 4-5 mins to allow for therapist and PCA to clean pt up from bowel movement. The pt was fatigued and required encouragement to stand while getting cleaned up. Sit to stand: Mod A-  Stand to sit:                       Mod A of 2 into chair   Bed to Chair:          Total A via Stedy     Activity Tolerance   Pt completed therapy session with fatigue     ADL Training:   Upper body dressing:  Not Tested  Upper body bathing:  Not Tested  Lower body dressing:  Not Tested  Lower body bathing: Total A   Toileting: Total A   Grooming/Hygiene:  Not Tested    Therapeutic Exercise:   N/A    Patient Education:   Role of OT  Recommendations for DC    Positioning Needs:    In bed, call light and needs in reach. Family Present:  No    Assessment: Pt agreed to work with OT and was max assist for supine to sit and mod assist of two to stand into the stedy. The pt had to stand for extended period to get cleaned up from bowel movement. Pt benefits from assistance to stand and to complete all ADLs at this time. Pt may benefit from continued skilled occupational therapy while in the hospital and upon d/c in order to progress to safe and more indpt functioning. GOALS- continue with goals   1). Bed to [address] [address] 13 and with use of RW     To be met in 5 Visits:  1). Supine to Sit: Supervision  2). Upper Body Bathing:  Min A  3). Lower Body Bathing:  Min A with appropriate AE  4). Upper Body Dressing: Min A  5). Lower Body Dressing: Min A with appropriate AE   6).  Pt to kimo UE exs x 15 reps      Plan: cont with POC    Gerardo Morgan OTR/L [id number]      If patient discharges from this facility prior to next visit, this note will serve as the Discharge Summary

## 2020-03-18 NOTE — OP NOTE
[address]                 [address] [address]                                OPERATIVE REPORT    PATIENT NAME: Anoop Campos                    :        1940  MED REC NO:   [id number]                          ROOM:       [id number]  ACCOUNT NO:   [de-identified]                           ADMIT DATE: 2020  PROVIDER:     Ifeoma Barrientos MD    DATE OF PROCEDURE:  2020    PREOPERATIVE DIAGNOSES:  1.  GI bleed. 2.  Hematemesis. POSTOPERATIVE DIAGNOSES:  1.  Moderate esophagitis, likely reflux. 2.  Retained contents in the stomach concerning for ileus versus  gastroparesis. 3.  Otherwise normal EGD. PROCEDURE:  EGD. INDICATIONS:  A 62-year-old female with history of hypertension,  hyperlipidemia, asthma, diabetes, atrial fibrillation, chronic kidney  disease, GERD, hypothyroidism, admitted with fall complicated by rib  fracture. Her hospital course has been prolonged due to colonic  pseudoobstruction and respiratory failure. She recently developed acute  onset of hematemesis overnight. An NG was placed but accidentally  removed by the patient. Her hemoglobin has declined slightly, thus a  diagnostic EGD is being performed for diagnostic purposes. She did have  a colonoscopy for decompression over a week ago. 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 MEDICATIONS:  Versed 2 mg and fentanyl 25 mcg. PROCEDURE IN DETAIL:  Informed consent obtained after discussing risks,  benefits, and alternatives. Full history and physical was performed. The patient was classified as ASA class III. Medications were given to  achieve adequate sedation. Cardiopulmonary status was continuously  monitored throughout the procedure. The patient was placed in left  lateral decubitus position.   Once the patient was deemed to be  adequately sedated, standard upper gastroscope was inserted in mouth and  advanced under direct visualization to the second portion of the  duodenum. The entire mucosa of asthma, stomach and (retroflexion  views), duodenum (bulb, sweep and second portion) were examined  carefully during withdrawal.  The patient tolerated procedure well  without difficulties. FINDINGS:  Esophagus:  Entire esophagus showed moderate esophagitis, likely reflux  type LA class D. No active bleeding identified. Stomach: There was large amount of retained contents consistent with  ileus related gastroparesis. Retroflexed view of the stomach was  normal.  No active fresh blood in the entire stomach. Duodenum:  Examined portion of the duodenum appeared normal.    SUMMARY  1.  Gastroparesis. 2.  Severe esophagitis. RECOMMENDATIONS:  1. Return the patient to floor for continuation of his medical care. 2.  Start pantoprazole twice daily. 3.  NG tube for decompression. 4.  PT, OT aggressive therapy. 5.  Monitor hemoglobin and observe for signs of bleeding. 6.  We will follow the patient with you.     EBL:<5mL      Vandana Garvey MD    D: 03/17/2020 17:50:57       T: 03/17/2020 17:55:55     [id number]  Job#: [id number]     Doc#: [id number]    CC:    Arn Severin, MD

## 2020-03-19 LAB
ANION GAP SERPL CALCULATED.3IONS-SCNC: 14 MMOL/L (ref 3–16)
BASOPHILS ABSOLUTE: 0 K/UL (ref 0–0.2)
BASOPHILS RELATIVE PERCENT: 0 %
BUN BLDV-MCNC: 29 MG/DL (ref 7–20)
CALCIUM SERPL-MCNC: 8.2 MG/DL (ref 8.3–10.6)
CHLORIDE BLD-SCNC: 110 MMOL/L (ref 99–110)
CO2: 19 MMOL/L (ref 21–32)
CREAT SERPL-MCNC: 1.5 MG/DL (ref 0.6–1.2)
EOSINOPHILS ABSOLUTE: 0.3 K/UL (ref 0–0.6)
EOSINOPHILS RELATIVE PERCENT: 2 %
GFR AFRICAN AMERICAN: 41
GFR NON-AFRICAN AMERICAN: 34
GLUCOSE BLD-MCNC: 113 MG/DL (ref 70–99)
GLUCOSE BLD-MCNC: 117 MG/DL (ref 70–99)
GLUCOSE BLD-MCNC: 118 MG/DL (ref 70–99)
GLUCOSE BLD-MCNC: 171 MG/DL (ref 70–99)
GLUCOSE BLD-MCNC: 192 MG/DL (ref 70–99)
HCT VFR BLD CALC: 26.1 % (ref 36–48)
HEMOGLOBIN: 8 G/DL (ref 12–16)
LYMPHOCYTES ABSOLUTE: 1.9 K/UL (ref 1–5.1)
LYMPHOCYTES RELATIVE PERCENT: 15 %
MAGNESIUM: 1.8 MG/DL (ref 1.8–2.4)
MCH RBC QN AUTO: 26.6 PG (ref 26–34)
MCHC RBC AUTO-ENTMCNC: 30.5 G/DL (ref 31–36)
MCV RBC AUTO: 87.3 FL (ref 80–100)
MONOCYTES ABSOLUTE: 0.4 K/UL (ref 0–1.3)
MONOCYTES RELATIVE PERCENT: 3 %
NEUTROPHILS ABSOLUTE: 10.1 K/UL (ref 1.7–7.7)
NEUTROPHILS RELATIVE PERCENT: 80 %
PDW BLD-RTO: 14.4 % (ref 12.4–15.4)
PERFORMED ON: ABNORMAL
PLATELET # BLD: 421 K/UL (ref 135–450)
PLATELET SLIDE REVIEW: ADEQUATE
PMV BLD AUTO: 7.7 FL (ref 5–10.5)
POTASSIUM REFLEX MAGNESIUM: 3.2 MMOL/L (ref 3.5–5.1)
RBC # BLD: 2.99 M/UL (ref 4–5.2)
SLIDE REVIEW: ABNORMAL
SODIUM BLD-SCNC: 143 MMOL/L (ref 136–145)
WBC # BLD: 12.6 K/UL (ref 4–11)

## 2020-03-19 PROCEDURE — 97530 THERAPEUTIC ACTIVITIES: CPT

## 2020-03-19 PROCEDURE — 83735 ASSAY OF MAGNESIUM: CPT

## 2020-03-19 PROCEDURE — 94761 N-INVAS EAR/PLS OXIMETRY MLT: CPT

## 2020-03-19 PROCEDURE — 6360000002 HC RX W HCPCS: Performed by: INTERNAL MEDICINE

## 2020-03-19 PROCEDURE — 2580000003 HC RX 258: Performed by: PHYSICIAN ASSISTANT

## 2020-03-19 PROCEDURE — 99232 SBSQ HOSP IP/OBS MODERATE 35: CPT | Performed by: INTERNAL MEDICINE

## 2020-03-19 PROCEDURE — 6370000000 HC RX 637 (ALT 250 FOR IP): Performed by: INTERNAL MEDICINE

## 2020-03-19 PROCEDURE — 36415 COLL VENOUS BLD VENIPUNCTURE: CPT

## 2020-03-19 PROCEDURE — 2580000003 HC RX 258: Performed by: INTERNAL MEDICINE

## 2020-03-19 PROCEDURE — 2700000000 HC OXYGEN THERAPY PER DAY

## 2020-03-19 PROCEDURE — 94669 MECHANICAL CHEST WALL OSCILL: CPT

## 2020-03-19 PROCEDURE — 85025 COMPLETE CBC W/AUTO DIFF WBC: CPT

## 2020-03-19 PROCEDURE — 94640 AIRWAY INHALATION TREATMENT: CPT

## 2020-03-19 PROCEDURE — 6370000000 HC RX 637 (ALT 250 FOR IP): Performed by: PHYSICIAN ASSISTANT

## 2020-03-19 PROCEDURE — 1200000000 HC SEMI PRIVATE

## 2020-03-19 PROCEDURE — C9113 INJ PANTOPRAZOLE SODIUM, VIA: HCPCS | Performed by: INTERNAL MEDICINE

## 2020-03-19 PROCEDURE — 6370000000 HC RX 637 (ALT 250 FOR IP): Performed by: NURSE PRACTITIONER

## 2020-03-19 PROCEDURE — 80048 BASIC METABOLIC PNL TOTAL CA: CPT

## 2020-03-19 RX ORDER — IPRATROPIUM BROMIDE AND ALBUTEROL SULFATE 2.5; .5 MG/3ML; MG/3ML
1 SOLUTION RESPIRATORY (INHALATION) 2 TIMES DAILY
Status: DISCONTINUED | OUTPATIENT
Start: 2020-03-19 | End: 2020-03-20 | Stop reason: HOSPADM

## 2020-03-19 RX ADMIN — CARBIDOPA AND LEVODOPA 1 TABLET: 25; 100 TABLET ORAL at 10:50

## 2020-03-19 RX ADMIN — PANTOPRAZOLE SODIUM 40 MG: 40 INJECTION, POWDER, FOR SOLUTION INTRAVENOUS at 10:50

## 2020-03-19 RX ADMIN — METOPROLOL TARTRATE 50 MG: 50 TABLET, FILM COATED ORAL at 10:51

## 2020-03-19 RX ADMIN — POTASSIUM BICARBONATE 40 MEQ: 782 TABLET, EFFERVESCENT ORAL at 21:09

## 2020-03-19 RX ADMIN — DULOXETINE HYDROCHLORIDE 60 MG: 60 CAPSULE, DELAYED RELEASE ORAL at 10:51

## 2020-03-19 RX ADMIN — HYDRALAZINE HYDROCHLORIDE 25 MG: 25 TABLET, FILM COATED ORAL at 10:50

## 2020-03-19 RX ADMIN — LAMOTRIGINE 50 MG: 25 TABLET ORAL at 21:16

## 2020-03-19 RX ADMIN — PROPAFENONE HYDROCHLORIDE 150 MG: 150 TABLET, FILM COATED ORAL at 14:40

## 2020-03-19 RX ADMIN — PIPERACILLIN SODIUM AND TAZOBACTAM SODIUM 3.38 G: 3; .375 INJECTION, POWDER, LYOPHILIZED, FOR SOLUTION INTRAVENOUS at 00:04

## 2020-03-19 RX ADMIN — LAMOTRIGINE 50 MG: 25 TABLET ORAL at 10:50

## 2020-03-19 RX ADMIN — Medication 2 PUFF: at 20:00

## 2020-03-19 RX ADMIN — MONTELUKAST SODIUM 10 MG: 10 TABLET, COATED ORAL at 21:17

## 2020-03-19 RX ADMIN — Medication 2 PUFF: at 08:10

## 2020-03-19 RX ADMIN — PIPERACILLIN SODIUM AND TAZOBACTAM SODIUM 3.38 G: 3; .375 INJECTION, POWDER, LYOPHILIZED, FOR SOLUTION INTRAVENOUS at 15:16

## 2020-03-19 RX ADMIN — ATORVASTATIN CALCIUM 20 MG: 10 TABLET, FILM COATED ORAL at 21:17

## 2020-03-19 RX ADMIN — APIXABAN 2.5 MG: 5 TABLET, FILM COATED ORAL at 21:17

## 2020-03-19 RX ADMIN — IPRATROPIUM BROMIDE AND ALBUTEROL SULFATE 3 ML: .5; 3 SOLUTION RESPIRATORY (INHALATION) at 08:10

## 2020-03-19 RX ADMIN — OXYCODONE HYDROCHLORIDE AND ACETAMINOPHEN 2 TABLET: 5; 325 TABLET ORAL at 21:17

## 2020-03-19 RX ADMIN — APIXABAN 2.5 MG: 5 TABLET, FILM COATED ORAL at 10:50

## 2020-03-19 RX ADMIN — CARBIDOPA AND LEVODOPA 1 TABLET: 25; 100 TABLET ORAL at 14:40

## 2020-03-19 RX ADMIN — PROPAFENONE HYDROCHLORIDE 150 MG: 150 TABLET, FILM COATED ORAL at 21:20

## 2020-03-19 RX ADMIN — LINACLOTIDE 145 MCG: 145 CAPSULE, GELATIN COATED ORAL at 05:56

## 2020-03-19 RX ADMIN — IPRATROPIUM BROMIDE AND ALBUTEROL SULFATE 3 ML: .5; 3 SOLUTION RESPIRATORY (INHALATION) at 20:00

## 2020-03-19 RX ADMIN — PROPAFENONE HYDROCHLORIDE 150 MG: 150 TABLET, FILM COATED ORAL at 05:56

## 2020-03-19 RX ADMIN — NIFEDIPINE 60 MG: 30 TABLET, FILM COATED, EXTENDED RELEASE ORAL at 10:49

## 2020-03-19 RX ADMIN — PIPERACILLIN SODIUM AND TAZOBACTAM SODIUM 3.38 G: 3; .375 INJECTION, POWDER, LYOPHILIZED, FOR SOLUTION INTRAVENOUS at 10:50

## 2020-03-19 RX ADMIN — SODIUM CHLORIDE: 9 INJECTION, SOLUTION INTRAVENOUS at 00:06

## 2020-03-19 RX ADMIN — Medication 10 ML: at 10:51

## 2020-03-19 RX ADMIN — POTASSIUM BICARBONATE 40 MEQ: 782 TABLET, EFFERVESCENT ORAL at 14:22

## 2020-03-19 RX ADMIN — SENNOSIDES AND DOCUSATE SODIUM 2 TABLET: 8.6; 5 TABLET ORAL at 10:51

## 2020-03-19 RX ADMIN — Medication 10 ML: at 21:17

## 2020-03-19 RX ADMIN — METOPROLOL TARTRATE 50 MG: 50 TABLET, FILM COATED ORAL at 21:18

## 2020-03-19 RX ADMIN — HYDRALAZINE HYDROCHLORIDE 25 MG: 25 TABLET, FILM COATED ORAL at 21:18

## 2020-03-19 RX ADMIN — PANTOPRAZOLE SODIUM 40 MG: 40 INJECTION, POWDER, FOR SOLUTION INTRAVENOUS at 21:17

## 2020-03-19 RX ADMIN — CARBIDOPA AND LEVODOPA 1 TABLET: 25; 100 TABLET ORAL at 21:17

## 2020-03-19 ASSESSMENT — PAIN SCALES - GENERAL
PAINLEVEL_OUTOF10: 0
PAINLEVEL_OUTOF10: 7
PAINLEVEL_OUTOF10: 0
PAINLEVEL_OUTOF10: 0

## 2020-03-19 ASSESSMENT — PAIN DESCRIPTION - LOCATION: LOCATION: ABDOMEN

## 2020-03-19 ASSESSMENT — PAIN DESCRIPTION - PAIN TYPE: TYPE: ACUTE PAIN

## 2020-03-19 NOTE — PROGRESS NOTES
2 tablet Oral Daily    linaclotide  145 mcg Oral QAM AC    bisacodyl  10 mg Rectal Daily    apixaban  2.5 mg Oral BID    DULoxetine  60 mg Oral Daily    atorvastatin  20 mg Oral Nightly    lamoTRIgine  50 mg Oral BID    metoprolol tartrate  50 mg Oral BID    montelukast  10 mg Oral Nightly    NIFEdipine  60 mg Oral Daily    propafenone  150 mg Oral 3 times per day    sodium chloride flush  10 mL Intravenous 2 times per day    lidocaine  1 patch Transdermal Daily    fluticasone  2 puff Inhalation BID       Continuous Infusions:   dextrose         PRN Meds:  oxyCODONE-acetaminophen **OR** oxyCODONE-acetaminophen, glucose, dextrose, glucagon (rDNA), dextrose, acetaminophen **OR** acetaminophen, albuterol sulfate HFA, sodium chloride flush, promethazine **OR** ondansetron      Data:  CBC:   Recent Labs     03/17/20  0534 03/18/20  0654 03/19/20  0542   WBC 13.4* 13.0* 12.6*   HGB 8.8* 8.3* 8.0*   HCT 27.4* 27.4* 26.1*   MCV 87.0 87.5 87.3    402 421     BMP:   Recent Labs     03/17/20  0534 03/18/20  0654 03/19/20  0542    139 143   K 3.5 3.1* 3.2*    104 110   CO2 20* 19* 19*   PHOS 3.8  --   --    BUN 49* 41* 29*   CREATININE 2.3* 1.8* 1.5*     LIVER PROFILE:   No results for input(s): AST, ALT, LIPASE, BILIDIR, BILITOT, ALKPHOS in the last 72 hours. 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 Recommend correlation with direct visualization to rule out mass. Esophagus appears fluid-filled suggesting reflux.          XR ABDOMEN (2 VIEWS)   Final Result   Overall worsening of diffuse intestinal distension         XR ABDOMEN (KUB) (SINGLE AP VIEW) Final Result   Stable intestinal distension status post rectal tube removal         XR CHEST PORTABLE   Final Result   Cardiomegaly with interval decrease in size of bilateral pleural effusions         XR ABDOMEN (KUB) (SINGLE AP VIEW)   Final Result   1. Colonic tube remains in place. 2. Diffuse colonic distension, slightly improved since 3/9/2020 as described   above. 3. No obvious free air or pneumatosis on supine view. XR ABDOMEN (KUB) (SINGLE AP VIEW)   Final Result   Slight improvement following insertion of rectal tube. Persisting generalized ileus pattern. Distal colonic obstruction remains a   differential consideration. XR ABDOMEN (KUB) (SINGLE AP VIEW)   Final Result   Probable worsening of diffuse gaseous distention of bowel, either ileus   versus very distal obstruction, favoring the former. Continued follow-up is   advised. XR ABDOMEN (2 VIEWS)   Final Result   Distension of the colon, cecal diameter 14 mm      The findings were sent to the Radiology Results [address] at 12:53   pm on 3/6/2020to be communicated to a licensed caregiver. XR ABDOMEN (KUB) (SINGLE AP VIEW)   Final Result   Unchanged colonic ileus over the past 18 hours         XR ABDOMEN (KUB) (SINGLE AP VIEW)   Final Result   Gaseous distention of the colon without obstruction, possibly an adynamic   ileus or Bandon's syndrome. CT ABDOMEN PELVIS WO CONTRAST Additional Contrast? None   Final Result   Marked distension of the ascending and transverse colon with mild gradual   decompression distally. Air-fluid levels are present throughout. 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 As the distal rectum/anus is outside the field of view, recommend rectal exam   to ensure there is no distal mass. Colonic diverticulosis. Small bilateral effusions, increased since 02/29/2020.          XR CHEST PORTABLE   Final Result   New Physical exam:    BP (!) 126/53   Pulse 117   Temp 98 °F (36.7 °C) (Oral)   Resp 16   Ht 5' 6\" (1.676 m)   Wt 281 lb (127.5 kg)   SpO2 99%   BMI 45.35 kg/m²     Gen: No distress. Alert. Obese . Eyes: PERRL. No sclera icterus. No conjunctival injection. ENT: No discharge. Pharynx clear. Neck: Trachea midline. Normal thyroid. Left neck less swollen, tender. Firm. Not warm. Less erythematous . Resp: No accessory muscle use. No crackles. No wheezes. No rhonchi. No dullness on percussion. CV: Regular rate. Regular rhythm. No murmur or rub. No edema. GI: Non-tender. Distended. No masses. No organomegaly. Normal bowel sounds. No hernia. Skin: Warm and dry. No nodule on exposed extremities. No rash on exposed extremities. Lymph: No cervical LAD. No supraclavicular LAD. M/S: No cyanosis. No joint deformity. No clubbing. Neuro: Awake. Reflexes 2+ symmetric bilaterally. Moves all 4 extremities, non focal  Psych: Oriented x 3. No anxiety or agitation.         agree with plan  above    cont IV abx    monitor crtn        Electronically signed by Darryl Montana MD on 3/19/2020 1:08 PM

## 2020-03-19 NOTE — PROGRESS NOTES
RESPIRATORY THERAPY ASSESSMENT    Name:  [address] [place] [address] Record Number:  [id number]  Age: 70 y.o. Gender: female  : 1940  Today's Date:  3/19/2020  Room:  0228/0228-02    Assessment     Is the patient being admitted for a COPD or Asthma exacerbation? No   (If yes the patient will be seen every 4 hours for the first 24 hours and then reassessed)    Patient Admission Diagnosis      Allergies  Allergies   Allergen Reactions    Levaquin [Levofloxacin]      Pt was given in er and developed hives and redness    Metoclopramide     Phenazopyridine     Pyridium [Phenazopyridine Hcl]     Reglan [Metoclopramide Hcl]     Reglan [Metoclopramide Hcl]      tremors       Minimum Predicted Vital Capacity:               Actual Vital Capacity:                    Pulmonary History:Asthma  Home Oxygen Therapy:  3 liters/min via nasal cannula  Home Respiratory Therapy:Albuterol, Albuterol/Ipratropium Bromide HHN, Budesonide and Salmeterol/Fluticasone Propionate   Current Respiratory Therapy:  duoneb QID, flovent  Treatment Type: MDI  Medications: Mometasone/Formoterol    Respiratory Severity Index(RSI)   Patients with orders for inhalation medications, oxygen, or any therapeutic treatment modality will be placed on Respiratory Protocol. They will be assessed with the first treatment and at least every 72 hours thereafter. The following severity scale will be used to determine frequency of treatment intervention.     Smoking History: Pulmonary Disease or Smoking History, Greater than 15 pack year = 2    Social History  Social History     Tobacco Use    Smoking status: Never Smoker    Smokeless tobacco: Never Used   Substance Use Topics    Alcohol use: No     Alcohol/week: 0.0 standard drinks    Drug use: No       Recent Surgical History: None = 0  Past Surgical History  Past Surgical History:   Procedure Laterality Date    APPENDECTOMY      CAROTID ENDARTERECTOMY      left    CATARACT REMOVAL      assessed. Plan of Care: change patient to duoneb BID per RS score     Electronically signed by Harriet Duverney, RCP on 3/19/2020 at 1:55 AM    Respiratory Protocol Guidelines     1. Assessment and treatment by Respiratory Therapy will be initiated for medication and therapeutic interventions upon initiation of aerosolized medication. 2. Physician will be contacted for respiratory rate (RR) greater than 35 breaths per minute. Therapy will be held for heart rate (HR) greater than 140 beats per minute, pending direction from physician. 3. Bronchodilators will be administered via Metered Dose Inhaler (MDI) with spacer when the following criteria are met:  a. Alert and cooperative     b. HR < 140 bpm  c. RR < 30 bpm                d. Can demonstrate a 2-3 second inspiratory hold  4. Bronchodilators will be administered via Hand Held Nebulizer M[facility]) to patients when ANY of the following criteria are met  a. Incognizant or uncooperative          b. Patients treated with HHN at Home        c. Unable to demonstrate proper use of MDI with spacer     d. RR > 30 bpm   5. Bronchodilators will be delivered via Metered Dose Inhaler (MDI), HHN, Aerogen to intubated patients on mechanical ventilation. 6. Inhalation medication orders will be delivered and/or substituted as outlined below. Aerosolized Medications Ordering and Administration Guidelines:    1. All Medications will be ordered by a physician, and their frequency and/or modality will be adjusted as defined by the patients Respiratory Severity Index (RSI) score. 2. If the patient does not have documented COPD, consider discontinuing anticholinergics when RSI is less than 9.  3. If the bronchospasm worsens (increased RSI), then the bronchodilator frequency can be increased to a maximum of every 4 hours. If greater than every 4 hours is required, the physician will be contacted.   4. If the bronchospasm improves, the frequency of the bronchodilator can be decreased, based on the patient's RSI, but not less than home treatment regimen frequency. 5. Bronchodilator(s) will be discontinued if patient has a RSI less than 9 and has received no scheduled or as needed treatment for 72  Hrs. Patients Ordered on a Mucolytic Agent:    1. Must always be administered with a bronchodilator. 2. Discontinue if patient experiences worsened bronchospasm, or secretions have lessened to the point that the patient is able to clear them with a cough. Anti-inflammatory and Combination Medications:    1. If the patient lacks prior history of lung disease, is not using inhaled anti-inflammatory medication at home, and lacks wheezing by examination or by history for at least 24 hours, contact physician for possible discontinuation.

## 2020-03-19 NOTE — PROGRESS NOTES
and mod assist of two to stand into the stedy. The pt stood approx one min Pt benefits from assistance to stand and to complete all ADLs at this time. Pt may benefit from continued skilled occupational therapy while in the hospital and upon d/c in order to progress to safe and more indpt functioning. GOALS- continue with goals 3-19-20  1). Bed to Hawa Washburn 13 and with use of RW     To be met in 5 Visits:  1). Supine to Sit: Supervision  2). Upper Body Bathing:  Min A  3). Lower Body Bathing:  Min A with appropriate AE  4). Upper Body Dressing: Min A  5). Lower Body Dressing: Min A with appropriate AE   6).  Pt to kimo UE exs x 15 reps      Plan: cont with EUGENE Mtz OTR/L [id number]      If patient discharges from this facility prior to next visit, this note will serve as the Discharge Summary

## 2020-03-19 NOTE — PROGRESS NOTES
PROGRESS NOTE  S:79 yrs Patient  admitted on 2/29/2020 with Intractable pain [R52]  Intractable pain [R52] . Today she complains of Diarrhea and Abdominal Pain    Exam:   Vitals:    03/19/20 0818   BP:    Pulse:    Resp:    Temp:    SpO2: 98%      General appearance: alert, appears stated age, cooperative and morbidly obese  HEENT: Oropharynx clear, no lesions  Neck: supple, symmetrical, trachea midline  Lungs: clear to auscultation bilaterally  Heart: regular rate and rhythm, S1, S2 normal, no murmur, click, rub or gallop  Abdomen: abnormal findings:  distended, obese and tenderness mild in the epigastrium  Extremities: extremities normal, atraumatic, no cyanosis or edema     Medications: Reviewed    Labs:  CBC:   Recent Labs     03/17/20  0534 03/18/20  0654 03/19/20  0542   WBC 13.4* 13.0* 12.6*   HGB 8.8* 8.3* 8.0*   HCT 27.4* 27.4* 26.1*   MCV 87.0 87.5 87.3    402 421     BMP:   Recent Labs     03/17/20  0534 03/18/20  0654 03/19/20  0542    139 143   K 3.5 3.1* 3.2*    104 110   CO2 20* 19* 19*   PHOS 3.8  --   --    BUN 49* 41* 29*   CREATININE 2.3* 1.8* 1.5*     LIVER PROFILE: No results for input(s): AST, ALT, LIPASE, PROT, BILIDIR, BILITOT, ALKPHOS in the last 72 hours. Invalid input(s): AMYLASE,  ALB    Attending Supervising [de-identified] Attestation Statement  The patient is a 70 y.o. female. I have performed a history and physical examination of the patient. I discussed the case with my physician assistant Corrina Burnham PA-C    I reviewed the patient's Past Medical History, Past Surgical History, Medications, and Allergies.      Physical Exam:  Vitals:    03/19/20 0307 03/19/20 0810 03/19/20 0812 03/19/20 0818   BP: (!) 97/58 (!) 159/73     Pulse: 96 112     Resp: 16 16     Temp: 97.8 °F (36.6 °C) 97.8 °F (36.6 °C)     TempSrc: Axillary Oral     SpO2: 97% 97% 97% 98%   Weight:       Height:           Physical Examination: General appearance - alert, well appearing, and in no distress  Mental status - alert, oriented to person, place, and time  Eyes - pupils equal and reactive, extraocular eye movements intact  Neck - supple, no significant adenopathy  Chest - clear to auscultation, no wheezes, rales or rhonchi, symmetric air entry  Heart - normal rate, regular rhythm, normal S1, S2, no murmurs, rubs, clicks or gallops  Abdomen - soft, nontender, nondistended, no masses or organomegaly  Extremities - no pedal edema noted        Impression: 59-year-old female with a history of anemia, asthma, atrial fibrillation, CKD, diabetes, diverticulosis, GERD, HTN, HLD, and hypothyroidism admitted with a fall complicated by colon pseudo-obstruction now c/b esophagitis and gastroparesis. Recommendation:  1. Continue PPI BID  2. Start full liquid diet and advance as tolerated  3. Encourage nutritional supplements  4. Aggressive PT/OT  5. Up in chair BID  6. Minimize narcotics      Tina Granados PA-C  10:22 AM 3/19/2020                      59-year-old female with a history of anemia, asthma, atrial fibrillation, CKD, diabetes, diverticulosis, GERD, HTN, HLD, and hypothyroidism admitted with a fall complicated by colon pseudo-obstruction now c/b esophagitis and gastroparesis. Advance to low fiber diet. PT/OT with sitting up in chair BID.      Kishore Mcmahan MD          [phone]  [phone]

## 2020-03-19 NOTE — PROGRESS NOTES
Pt awake in bed. PM assessment complete. Lizbeth meds given. Held BP meds 96/49. Recheck 116/69. Repositioned for comfort. Mepilex to buttocks changed. Purewick changed. Pain patch removed. C/o abd pain, medicated with 1 tab percocet per request. No other needs voiced. Bipap placed. Spouse at bedside. Bed alarm. Call light . Will cont to monitor.  Marixa Edwards

## 2020-03-19 NOTE — PROGRESS NOTES
rest breaks. SpO2: >91% throughout session on 3L O2  HR: 135 max during therapy session  BP:     Other  Pt with tendency to hold breath during transfers requiring verbal cues for PLB    Assessment :  Patient continues to fatigue quickly during session due to generalized weakness. Patient continues to demonstrate decreased tolerance for sitting and standing with tendency for posterior lean with sitting EOB requiring assist to correct. Additionally patient with tendency to hold breath with transfers requiring verbal cues for PLB. Patient also requires moderate amounts of verbal cues for sequencing transitions. Continue to recommend SNF at discharge. Goals (all goals ongoing unless otherwise indicated)  To be met in 3 visits:  1). Independent with LE Ex x 10 reps - Ongoing     To be met in 6 visits:  1).  Supine to/from sit: Danisha Cater  - Ongoing (not met)  2).  Sit to/from stand: Min A - Ongoing (not met)  3).  Bed to chair: Min A - Ongoing (not met)  4).  Gait: Ambulate 50 ft.  with  Min A  and use of rolling walker (RW) - Ongoing (not met)  5).  Tolerate B LE exercises 3 sets of 10-15 reps - Ongoing (not met)  6).  Ascend/descend 1 step without rail, with Min A and use of RW.  - Ongoing (not met)    Plan   Continue with plan of care. Conner Grande, PT, DPT #[id number]      If patient discharges from this facility prior to next visit, this note will serve as the Discharge Summary.

## 2020-03-20 VITALS
WEIGHT: 281 LBS | HEART RATE: 76 BPM | RESPIRATION RATE: 16 BRPM | BODY MASS INDEX: 45.16 KG/M2 | HEIGHT: 66 IN | DIASTOLIC BLOOD PRESSURE: 70 MMHG | SYSTOLIC BLOOD PRESSURE: 144 MMHG | TEMPERATURE: 98.1 F | OXYGEN SATURATION: 99 %

## 2020-03-20 LAB
ANION GAP SERPL CALCULATED.3IONS-SCNC: 13 MMOL/L (ref 3–16)
BANDED NEUTROPHILS RELATIVE PERCENT: 1 % (ref 0–7)
BASOPHILS ABSOLUTE: 0.3 K/UL (ref 0–0.2)
BASOPHILS RELATIVE PERCENT: 2 %
BUN BLDV-MCNC: 17 MG/DL (ref 7–20)
CALCIUM SERPL-MCNC: 8.4 MG/DL (ref 8.3–10.6)
CHLORIDE BLD-SCNC: 104 MMOL/L (ref 99–110)
CO2: 22 MMOL/L (ref 21–32)
CREAT SERPL-MCNC: 1.2 MG/DL (ref 0.6–1.2)
EOSINOPHILS ABSOLUTE: 0 K/UL (ref 0–0.6)
EOSINOPHILS RELATIVE PERCENT: 0 %
GFR AFRICAN AMERICAN: 53
GFR NON-AFRICAN AMERICAN: 43
GLUCOSE BLD-MCNC: 105 MG/DL (ref 70–99)
GLUCOSE BLD-MCNC: 133 MG/DL (ref 70–99)
GLUCOSE BLD-MCNC: 139 MG/DL (ref 70–99)
GLUCOSE BLD-MCNC: 139 MG/DL (ref 70–99)
HCT VFR BLD CALC: 30.1 % (ref 36–48)
HEMOGLOBIN: 9.4 G/DL (ref 12–16)
LYMPHOCYTES ABSOLUTE: 1.9 K/UL (ref 1–5.1)
LYMPHOCYTES RELATIVE PERCENT: 13 %
MAGNESIUM: 1.8 MG/DL (ref 1.8–2.4)
MCH RBC QN AUTO: 26.5 PG (ref 26–34)
MCHC RBC AUTO-ENTMCNC: 31.2 G/DL (ref 31–36)
MCV RBC AUTO: 84.9 FL (ref 80–100)
MONOCYTES ABSOLUTE: 0.6 K/UL (ref 0–1.3)
MONOCYTES RELATIVE PERCENT: 4 %
NEUTROPHILS ABSOLUTE: 11.9 K/UL (ref 1.7–7.7)
NEUTROPHILS RELATIVE PERCENT: 80 %
PDW BLD-RTO: 14.4 % (ref 12.4–15.4)
PERFORMED ON: ABNORMAL
PLATELET # BLD: 496 K/UL (ref 135–450)
PLATELET SLIDE REVIEW: ABNORMAL
PMV BLD AUTO: 7.7 FL (ref 5–10.5)
POTASSIUM REFLEX MAGNESIUM: 3.3 MMOL/L (ref 3.5–5.1)
RBC # BLD: 3.55 M/UL (ref 4–5.2)
SLIDE REVIEW: ABNORMAL
SODIUM BLD-SCNC: 139 MMOL/L (ref 136–145)
WBC # BLD: 14.7 K/UL (ref 4–11)

## 2020-03-20 PROCEDURE — 85025 COMPLETE CBC W/AUTO DIFF WBC: CPT

## 2020-03-20 PROCEDURE — 2580000003 HC RX 258: Performed by: INTERNAL MEDICINE

## 2020-03-20 PROCEDURE — 97530 THERAPEUTIC ACTIVITIES: CPT

## 2020-03-20 PROCEDURE — 6370000000 HC RX 637 (ALT 250 FOR IP): Performed by: INTERNAL MEDICINE

## 2020-03-20 PROCEDURE — 6370000000 HC RX 637 (ALT 250 FOR IP)

## 2020-03-20 PROCEDURE — 99238 HOSP IP/OBS DSCHRG MGMT 30/<: CPT | Performed by: INTERNAL MEDICINE

## 2020-03-20 PROCEDURE — 94640 AIRWAY INHALATION TREATMENT: CPT

## 2020-03-20 PROCEDURE — 94761 N-INVAS EAR/PLS OXIMETRY MLT: CPT

## 2020-03-20 PROCEDURE — C9113 INJ PANTOPRAZOLE SODIUM, VIA: HCPCS | Performed by: INTERNAL MEDICINE

## 2020-03-20 PROCEDURE — 80048 BASIC METABOLIC PNL TOTAL CA: CPT

## 2020-03-20 PROCEDURE — 36415 COLL VENOUS BLD VENIPUNCTURE: CPT

## 2020-03-20 PROCEDURE — 2700000000 HC OXYGEN THERAPY PER DAY

## 2020-03-20 PROCEDURE — 6360000002 HC RX W HCPCS: Performed by: INTERNAL MEDICINE

## 2020-03-20 PROCEDURE — 83735 ASSAY OF MAGNESIUM: CPT

## 2020-03-20 PROCEDURE — 6370000000 HC RX 637 (ALT 250 FOR IP): Performed by: PHYSICIAN ASSISTANT

## 2020-03-20 RX ORDER — LACTOBACILLUS RHAMNOSUS GG 10B CELL
1 CAPSULE ORAL 2 TIMES DAILY
Qty: 30 CAPSULE | Refills: 0 | DISCHARGE
Start: 2020-03-20 | End: 2020-04-21 | Stop reason: ALTCHOICE

## 2020-03-20 RX ORDER — POTASSIUM CHLORIDE 20 MEQ/1
20 TABLET, EXTENDED RELEASE ORAL DAILY
DISCHARGE
Start: 2020-03-20 | End: 2020-04-21 | Stop reason: SDUPTHER

## 2020-03-20 RX ORDER — BISACODYL 10 MG
10 SUPPOSITORY, RECTAL RECTAL DAILY
DISCHARGE
Start: 2020-03-21 | End: 2020-04-20

## 2020-03-20 RX ORDER — SENNA AND DOCUSATE SODIUM 50; 8.6 MG/1; MG/1
2 TABLET, FILM COATED ORAL DAILY
DISCHARGE
Start: 2020-03-21 | End: 2020-06-11 | Stop reason: CLARIF

## 2020-03-20 RX ORDER — PANTOPRAZOLE SODIUM 40 MG/1
40 TABLET, DELAYED RELEASE ORAL DAILY
DISCHARGE
Start: 2020-03-20 | End: 2020-04-21 | Stop reason: ALTCHOICE

## 2020-03-20 RX ORDER — AMOXICILLIN AND CLAVULANATE POTASSIUM 875; 125 MG/1; MG/1
1 TABLET, FILM COATED ORAL 2 TIMES DAILY
Qty: 14 TABLET | Refills: 0 | DISCHARGE
Start: 2020-03-20 | End: 2020-03-27

## 2020-03-20 RX ORDER — POTASSIUM CHLORIDE 750 MG/1
20 TABLET, EXTENDED RELEASE ORAL ONCE
Status: DISCONTINUED | OUTPATIENT
Start: 2020-03-20 | End: 2020-03-20 | Stop reason: ALTCHOICE

## 2020-03-20 RX ORDER — ATORVASTATIN CALCIUM 40 MG/1
40 TABLET, FILM COATED ORAL DAILY
DISCHARGE
Start: 2020-03-20 | End: 2021-01-11 | Stop reason: SDUPTHER

## 2020-03-20 RX ORDER — DIMETHICONE, OXYBENZONE, AND PADIMATE O 2; 2.5; 6.6 G/100G; G/100G; G/100G
STICK TOPICAL
Status: COMPLETED
Start: 2020-03-20 | End: 2020-03-20

## 2020-03-20 RX ORDER — OXYCODONE HYDROCHLORIDE AND ACETAMINOPHEN 5; 325 MG/1; MG/1
1 TABLET ORAL EVERY 4 HOURS PRN
Qty: 10 TABLET | Refills: 0 | Status: SHIPPED | OUTPATIENT
Start: 2020-03-20 | End: 2020-03-23

## 2020-03-20 RX ADMIN — OXYCODONE HYDROCHLORIDE AND ACETAMINOPHEN 2 TABLET: 5; 325 TABLET ORAL at 16:47

## 2020-03-20 RX ADMIN — PROPAFENONE HYDROCHLORIDE 150 MG: 150 TABLET, FILM COATED ORAL at 06:07

## 2020-03-20 RX ADMIN — DIMETHICONE, OXYBENZONE, AND PADIMATE O: 2; 2.5; 6.6 STICK TOPICAL at 06:42

## 2020-03-20 RX ADMIN — BISACODYL 10 MG: 10 SUPPOSITORY RECTAL at 09:36

## 2020-03-20 RX ADMIN — PIPERACILLIN SODIUM AND TAZOBACTAM SODIUM 3.38 G: 3; .375 INJECTION, POWDER, LYOPHILIZED, FOR SOLUTION INTRAVENOUS at 00:30

## 2020-03-20 RX ADMIN — POTASSIUM BICARBONATE 20 MEQ: 782 TABLET, EFFERVESCENT ORAL at 15:22

## 2020-03-20 RX ADMIN — LAMOTRIGINE 50 MG: 25 TABLET ORAL at 09:50

## 2020-03-20 RX ADMIN — Medication 2 PUFF: at 07:46

## 2020-03-20 RX ADMIN — IPRATROPIUM BROMIDE AND ALBUTEROL SULFATE 3 ML: .5; 3 SOLUTION RESPIRATORY (INHALATION) at 07:46

## 2020-03-20 RX ADMIN — Medication 10 ML: at 09:51

## 2020-03-20 RX ADMIN — OXYCODONE HYDROCHLORIDE AND ACETAMINOPHEN 2 TABLET: 5; 325 TABLET ORAL at 08:39

## 2020-03-20 RX ADMIN — PIPERACILLIN SODIUM AND TAZOBACTAM SODIUM 3.38 G: 3; .375 INJECTION, POWDER, LYOPHILIZED, FOR SOLUTION INTRAVENOUS at 08:39

## 2020-03-20 RX ADMIN — HYDRALAZINE HYDROCHLORIDE 25 MG: 25 TABLET, FILM COATED ORAL at 09:50

## 2020-03-20 RX ADMIN — METOPROLOL TARTRATE 50 MG: 50 TABLET, FILM COATED ORAL at 09:50

## 2020-03-20 RX ADMIN — CARBIDOPA AND LEVODOPA 1 TABLET: 25; 100 TABLET ORAL at 15:17

## 2020-03-20 RX ADMIN — DULOXETINE HYDROCHLORIDE 60 MG: 60 CAPSULE, DELAYED RELEASE ORAL at 09:50

## 2020-03-20 RX ADMIN — NIFEDIPINE 60 MG: 30 TABLET, FILM COATED, EXTENDED RELEASE ORAL at 09:50

## 2020-03-20 RX ADMIN — SENNOSIDES AND DOCUSATE SODIUM 2 TABLET: 8.6; 5 TABLET ORAL at 09:50

## 2020-03-20 RX ADMIN — APIXABAN 2.5 MG: 5 TABLET, FILM COATED ORAL at 09:50

## 2020-03-20 RX ADMIN — PANTOPRAZOLE SODIUM 40 MG: 40 INJECTION, POWDER, FOR SOLUTION INTRAVENOUS at 09:51

## 2020-03-20 RX ADMIN — PROPAFENONE HYDROCHLORIDE 150 MG: 150 TABLET, FILM COATED ORAL at 15:17

## 2020-03-20 RX ADMIN — LINACLOTIDE 145 MCG: 145 CAPSULE, GELATIN COATED ORAL at 06:07

## 2020-03-20 RX ADMIN — CARBIDOPA AND LEVODOPA 1 TABLET: 25; 100 TABLET ORAL at 09:51

## 2020-03-20 ASSESSMENT — PAIN SCALES - GENERAL
PAINLEVEL_OUTOF10: 7
PAINLEVEL_OUTOF10: 7
PAINLEVEL_OUTOF10: 5

## 2020-03-20 NOTE — DISCHARGE SUMMARY
respiratory failure   - suspect 2/2 above. Started IS and attempted to wean oxygen as able  - checked CXR 3/12 - decreased size of pleural effusions   - she is not using IS. This was encouraged and recommended. 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   - CT a/p as above-  Distension of ascending and transverse colon  - GI consulted. Status post rectal decompression tube placement on 3/8/2020  - linzess added   - 3/10: rectal tube remains in place but abdomen is becoming more distended. Checked KUB - improved  3/11: Rectal tube removed. Started full liquids and advance to low fiber as tolerated  3/12: distention persistent- checked AXR - unchanged  3/13: distention persistent, added suppository   3/14: had 3 BMs yesterday, distention persistent. Cont suppositories and ambulation, tolerating diet   3/16: Persistent abd distention, S/P attempt at NG decompression    3/17: Persistent abd distention. Underwent EGD, retained stomach contents noted, NG placed  3/18: Abdomen remains distended. + BM after suppository. NG tube now out, clear liquids resumed with hope to advance diet. If unable to advance diet will need to consider alternate methods of nutrition   3/19, 3/20: abdomen less distended. she is having watery diarrhea. on linzess and senokot per GI. She has been advanced to low fiber diet and tolerating      #Left neck swelling 2/2 Sialadenitis   - CT soft tissue neck done, see results above. No abscess  - Cont  antibiotics - IV Zosyn D#5. DC vanc due to elevated crtn   - Continued to monitor closely with continuous pulse oximetry . Left neck swelling has improved. DC on PO abx      #Abnormal appearance of the hypopharynx on CT  - radiologist recommends direct visualization to rule out mass. She is tolerating orally, no airway compromise.   Will give ENT referral on discharge      #Gastroparesis  #Severe esophagitis  - seen on EGD 3/17/2020  - Final Result   Stable intestinal distension status post rectal tube removal         XR CHEST PORTABLE   Final Result   Cardiomegaly with interval decrease in size of bilateral pleural effusions         XR ABDOMEN (KUB) (SINGLE AP VIEW)   Final Result   1. Colonic tube remains in place. 2. Diffuse colonic distension, slightly improved since 3/9/2020 as described   above. 3. No obvious free air or pneumatosis on supine view. XR ABDOMEN (KUB) (SINGLE AP VIEW)   Final Result   Slight improvement following insertion of rectal tube. Persisting generalized ileus pattern. Distal colonic obstruction remains a   differential consideration. XR ABDOMEN (KUB) (SINGLE AP VIEW)   Final Result   Probable worsening of diffuse gaseous distention of bowel, either ileus   versus very distal obstruction, favoring the former. Continued follow-up is   advised. XR ABDOMEN (2 VIEWS)   Final Result   Distension of the colon, cecal diameter 14 mm      The findings were sent to the Radiology Results [address] at 12:53   pm on 3/6/2020to be communicated to a licensed caregiver. XR ABDOMEN (KUB) (SINGLE AP VIEW)   Final Result   Unchanged colonic ileus over the past 18 hours         XR ABDOMEN (KUB) (SINGLE AP VIEW)   Final Result   Gaseous distention of the colon without obstruction, possibly an adynamic   ileus or Strang's syndrome. CT ABDOMEN PELVIS WO CONTRAST Additional Contrast? None   Final Result   Marked distension of the ascending and transverse colon with mild gradual   decompression distally. Air-fluid levels are present throughout. 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 As the distal rectum/anus is outside the field of view, recommend rectal exam   to ensure there is no distal mass. Colonic diverticulosis. Small bilateral effusions, increased since 02/29/2020.          XR CHEST PORTABLE   Final Result   New bilateral pleural effusions. CT CHEST WO CONTRAST   Final Result   Dependent airspace disease bilaterally, right greater than left. Associated   small effusion on the right is noted. Findings may be related to   atelectasis. A component of contusion in the right lung base would be   difficult to exclude given the history of recent trauma. No pneumothorax. Small mediastinal and retrocrural nodes         CT Cervical Spine WO Contrast   Final Result   No acute abnormality of the cervical spine. CT Head WO Contrast   Final Result   No acute intracranial abnormality. XR CHEST PORTABLE   Final Result   No acute process. Discharge Medications     Medication List      START taking these medications    amoxicillin-clavulanate 875-125 MG per tablet  Commonly known as:  AUGMENTIN  Take 1 tablet by mouth 2 times daily for 7 days     bisacodyl 10 MG suppository  Commonly known as:  DULCOLAX  Place 1 suppository rectally daily  Start taking on:  March 21, 2020     lactobacillus capsule  Take 1 capsule by mouth 2 times daily     linaclotide 145 MCG capsule  Commonly known as:  LINZESS  Take 1 capsule by mouth every morning (before breakfast)  Start taking on:  March 21, 2020     oxyCODONE-acetaminophen 5-325 MG per tablet  Commonly known as:  PERCOCET  Take 1 tablet by mouth every 4 hours as needed for Pain for up to 3 days.      pantoprazole 40 MG tablet  Commonly known as:  PROTONIX  Take 1 tablet by mouth daily     potassium chloride 20 MEQ extended release tablet  Commonly known as:  KLOR-CON M  Take 1 tablet by mouth daily     sennosides-docusate sodium 8.6-50 MG tablet  Commonly known as:  SENOKOT-S  Take 2 tablets by mouth daily  Start taking on:  March 21, 2020        CHANGE how you take these medications    atorvastatin 40 MG tablet  Commonly known as:  LIPITOR  Take 1 tablet by mouth daily Take 1 and 1/2 tablets by mouth every evening  What changed:    · how much to

## 2020-03-20 NOTE — DISCHARGE INSTR - COC
Continuity of Care Form    Patient Name: Ayana Dunlap   :  1940  MRN:  [id number]    [facility] date:  2020  Discharge date:  2020    Code Status Order: Full Code   Advance Directives:   Advance Care Flowsheet Documentation     Date/Time Healthcare Directive Type of Healthcare Directive Copy in [address] Agent's Name Healthcare Agent's Phone Number    [phone]  Yes, patient has an advance directive for healthcare treatment  Living will;Durable power of  for health care  No, copy requested from family  Healthcare power of   Rebeka Andre  --          Admitting Physician:  Promise Shannon MD  PCP: Charley Ramos MD    Discharging Nurse: [facility] Unit/Room#: 0228/0228-02  Discharging Unit Phone Number: [phone]    Emergency Contact:   Extended Emergency Contact Information  Primary Emergency Contact:  Wesly Bowen  Address: [address] [place] [address]  Home Phone: [phone]  Work Phone: [phone]  Mobile Phone: [phone]  Relation: Spouse    Past Surgical History:  Past Surgical History:   Procedure Laterality Date    APPENDECTOMY      CAROTID ENDARTERECTOMY      left    CATARACT REMOVAL      CERVICAL FUSION  2010    CHOLECYSTECTOMY      COLONOSCOPY N/A 3/8/2020    COLONOSCOPY FLEXIBLE W/ DECOMPRESSION performed by Maria G Oscar MD at [facility]      HYSTERECTOMY      JOINT REPLACEMENT Bilateral     KNEE SURGERY      replacement x 2    SHOULDER SURGERY      TONSILLECTOMY      TUBAL LIGATION      UPPER GASTROINTESTINAL ENDOSCOPY  2020    Esophagitis    UPPER GASTROINTESTINAL ENDOSCOPY N/A 3/17/2020    EGD performed by Sumi Villa MD at [address] [place] [address]         Immunization History:   Immunization History   Administered Date(s) Administered   [facility] Hepatitis A 05/08/2019    Hepatitis A Adult (Havrix, Vaqta) 05/08/2019, 12/17/2019    Hepatitis A Adult (Vaqta) 05/08/2019    Influenza A (W7Y1-44) Vaccine PF IM 01/23/2010    Influenza Vaccine, unspecified formulation 08/09/2016, 09/01/2017    Influenza Virus Vaccine 09/27/2011, 10/01/2012, 08/09/2016    Influenza Whole 09/02/2015    Influenza, High Dose (Fluzone 65 yrs and older) 09/09/2015, 08/15/2016, 09/15/2017, 09/14/2018    Influenza, Triv, inactivated, subunit, adjuvanted, IM (Fluad 65 yrs and older) 09/27/2019    Pneumococcal Conjugate 13-valent ([id number]) 07/28/2015    Pneumococcal Polysaccharide (Siznitdgw44) 11/26/2007    Zoster Recombinant (Shingrix) 05/08/2019, 12/17/2019       Active Problems:  Patient Active Problem List   Diagnosis Code    GERD (gastroesophageal reflux disease) K21.9    Essential hypertension I10    Hyperlipidemia E78.5    Restless leg syndrome G25.81    Chronic normocytic anemia D64.9    Postural dizziness R42    Chronic kidney disease N18.9    Moderate persistent asthma J45.40    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-sided sciatica M54.41, G89.29    Morbid obesity with BMI of 40.0-44.9, adult (HCC) E66.01, Z68.41    Type 2 diabetes mellitus with stage 3 chronic kidney disease, without long-term current use of insulin (HCC) E11.22, N18.3    CAD (coronary artery disease) I25.10    Parkinson's disease ([facility] [address] G20    Severe obstructive sleep apnea G47.33    Hypothyroidism E03.9    Hepatic steatosis K76.0    Diverticulosis K57.90    Blind left eye H54.40    L carotid artery stenosis s/p CEA I65.29    Paroxysmal atrial fibrillation (HCC) I48.0    COPD (chronic obstructive pulmonary disease) (HCC) J44.9    Acute respiratory failure with hypoxia (HCC) W27.45    Diastolic congestive heart failure (HCC) I50.30    Basal cell carcinoma of left cheek C44.319    Acute respiratory failure with hypoxemia (HCC) J96.01    Pneumonia due to infectious organism J18.9    Anemia due to chronic blood loss D50.0    Neoplasm of uncertain behavior of skin D48.5    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) N18.6, Z99.2    Partial symptomatic epilepsy with complex partial seizures, not intractable, without status epilepticus ([facility] [address] G40.209    Intractable pain R52    Chronic kidney disease, stage 3 ([facility] [address] N18.3    Fall W19. Martha Miu    Hyperkalemia E87.5    Skin tear of left hand without complication N90.118F    Acute kidney injury superimposed on CKD ([facility]) N17.9, N18.9    Contusion of rib on right side S20.211A    Pleural effusion J90    Chest pain on breathing R07.1    Abdominal distension R14.0    Neck swelling R22.1    Colonic pseudoobstruction K59.8    Moderate protein-calorie malnutrition (HCC) E44.0    Sialadenitis K11.20       Isolation/Infection:   Isolation          No Isolation        Patient Infection Status     None to display          Nurse Assessment:  Last Vital Signs: BP (!) 169/72   Pulse 85   Temp 97.8 °F (36.6 °C) (Oral)   Resp 16   Ht 5' 6\" (1.676 m)   Wt 281 lb (127.5 kg)   SpO2 98%   BMI 45.35 kg/m²     Last documented pain score (0-10 scale): Pain Level: 7  Last Weight:   Wt Readings from Last 1 Encounters:   03/13/20 281 lb (127.5 kg)     Mental Status:  oriented and alert    IV Access:  - None    Nursing Mobility/ADLs:  Walking   Dependent  Transfer  Dependent  Bathing  Assisted  Dressing  Assisted  Toileting  Assisted  Feeding  Independent  Med Admin  Assisted  Med Delivery   crushed and prefers mixed with chocolate pudding     Wound Care Documentation and Therapy:  Wound 12/10/18 Buttocks Right Stage II pressure ulcer (Active)   Dressing Status Clean;Dry; Intact 3/15/2020 12:15 PM   Dressing Changed Changed/New 3/19/2020  9:25 PM   Dressing/Treatment Foam 3/18/2020  9:30 PM   Wound Cleansed Wound cleanser 3/5/2020  4:11 PM   Wound Length (cm) 1 cm 3/17/2020 10:00 PM   Wound Width (cm) 1.2 cm 3/17/2020 10:00 PM   Wound WORK SECTION    Inpatient Status Date: 03/01/2020    Readmission Risk Assessment Score:  Readmission Risk              Risk of Unplanned Readmission:        27           Discharging to Facility/ Agency   · Name: [facility]  · Address: [address] [place] [address],[address] [place] [address] LULU Mijares   · Phone: [phone]  · Fax: [phone]    Dialysis Facility (if applicable)   · Name:  · Address:  · Dialysis Schedule:  · Phone:  · Fax:    / signature: Electronically signed by Danilo Gloria RN on 3/20/20 at 3:33 PM EDT    PHYSICIAN SECTION    Prognosis: Good    Condition at Discharge: Stable    Rehab Potential (if transferring to Rehab): Good    Recommended Labs or Other Treatments After Discharge:     Physician Certification: I certify the above information and transfer of Antoinette Holter  is necessary for the continuing treatment of the diagnosis listed and that she requires [place] for less 30 days.      Update Admission H&P: No change in H&P    PHYSICIAN SIGNATURE:  Electronically signed by Terese Zelaya MD on 3/20/20 at 1:27 PM EDT

## 2020-03-20 NOTE — PROGRESS NOTES
PROGRESS NOTE  S:79 yrs Patient  admitted on 2/29/2020 with Intractable pain [R52]  Intractable pain [R52] . Today she complains of Abdominal Pain and Distension. Exam:   Vitals:    03/20/20 0930   BP: (!) 169/72   Pulse: 85   Resp: 16   Temp: 97.8 °F (36.6 °C)   SpO2: 98%      General appearance: alert, appears stated age, cooperative and morbidly obese  HEENT: Oropharynx clear, no lesions  Neck: no adenopathy and supple, symmetrical, trachea midline  Lungs: clear to auscultation bilaterally  Heart: regular rate and rhythm, S1, S2 normal, no murmur, click, rub or gallop  Abdomen: abnormal findings:  distended, obese and tenderness mild in the epigastrium  Extremities: extremities normal, atraumatic, no cyanosis or edema     Medications: Reviewed    Labs:  CBC:   Recent Labs     03/18/20  0654 03/19/20  0542 03/20/20  0529   WBC 13.0* 12.6* 14.7*   HGB 8.3* 8.0* 9.4*   HCT 27.4* 26.1* 30.1*   MCV 87.5 87.3 84.9    421 496*     BMP:   Recent Labs     03/18/20  0654 03/19/20  0542 03/20/20  0529    143 139   K 3.1* 3.2* 3.3*    110 104   CO2 19* 19* 22   BUN 41* 29* 17   CREATININE 1.8* 1.5* 1.2     Attending Supervising [de-identified] Attestation Statement  The patient is a 70 y.o. female. I have performed a history and physical examination of the patient. I discussed the case with my physician assistant Netta Quigley PA-C    I reviewed the patient's Past Medical History, Past Surgical History, Medications, and Allergies.      Physical Exam:  Vitals:    03/19/20 2000 03/20/20 0312 03/20/20 0746 03/20/20 0930   BP:  (!) 147/70  (!) 169/72   Pulse:  75  85   Resp: 18 18 18 16   Temp:  98 °F (36.7 °C)  97.8 °F (36.6 °C)   TempSrc:  Oral  Oral   SpO2: 99% 95% 99% 98%   Weight:       Height:           Physical Examination: General appearance - alert, well appearing, and in no distress  Mental status - alert, oriented to person, place, and time  Eyes - pupils

## 2020-03-20 NOTE — PROGRESS NOTES
Transport here at this time. All belongings gathered by  and sent with pt. Left facility without incident.

## 2020-03-20 NOTE — FLOWSHEET NOTE
03/20/20 0930   Vital Signs   Temp 97.8 °F (36.6 °C)   Temp Source Oral   Pulse 85   Heart Rate Source Monitor   Resp 16   BP (!) 169/72   BP Location Left lower arm   BP Upper/Lower Lower   Patient Position Supine   Level of Consciousness 0   MEWS Score 1   Oxygen Therapy   SpO2 98 %   O2 Device Nasal cannula   O2 Flow Rate (L/min) 3 L/min   Patient Observation   Observations turned down to 2L . AM assessment completed, see flow sheet. Pt is alert and oriented. Vital signs are WNL. Respirations are even & easy. No complaints of abdominal pain, but is still having watery stools. Pt denies needs at this time. SR up x 2, and bed in low position. Call light is within reach.

## 2020-03-20 NOTE — CARE COORDINATION
INTERDISCIPLINARY PLAN OF CARE CONFERENCE and DC note    Date/Time: 3/20/2020 11:38 AM  Completed by: Alfonso Rudolph      Patient Name:  Brayan Munoz  YOB: 1940  Admitting Diagnosis: Intractable pain [R52]  Intractable pain [R52]     Admit Date/Time:  2020  4:03 PM    Chart reviewed. Interdisciplinary team met to discuss patient progress and discharge plans. Disciplines included Case Management, Nursing, and Dietitian. Current Status: IP 2020    PT/OT recommendation: SNF    Anticipated Discharge Date: Weekend  Expected D/C Disposition:  Skilled nursing facility  Confirmed plan with patient and spouse: Yes  Discharge Plan Comments: The Plan for Transition of Care is related to the following treatment goals: DC to SNF. +bed at [facility] for possible DC over the weekend. Update called to [facility] Alyssia Meraz) +CM following. Home O2 in place on admit: No  Pt informed of need to bring portable home O2 tank on day of discharge for nursing to connect prior to leaving:  No  Verbalized agreement/Understanding:  No                                                                                                          DISCHARGE ORDER  Date/Time 3/20/2020 3:42 PM  Completed by: Alfonso Rudolph    Patient Name: Brayan Munoz    : 1940         Discharge orders and Continuity of Care faxed to facility: Yes  Hospital Exemption Notification System complete: Yes: YUE  Transportation arranged: Yes - [address] ([place] [address]) @ 16:30 PM.  Patient / Family (pt,spouse) aware of  time: Yes   Nursing aware of  time: Yes: Sandra DORMAN  Receiving facility aware of  time: Yes: Ricardo Matamoros obtained? NA    Discharge timeout done with [facility] RN. All discharge needs and concerns addressed. Discharging nurse to complete MICA, reconcile AVS, and place final copy with patient's discharge packet.  Discharging RN to ensure that written prescriptions

## 2020-03-23 ENCOUNTER — CARE COORDINATION (OUTPATIENT)
Dept: CARE COORDINATION | Age: 80
End: 2020-03-23

## 2020-03-23 NOTE — CARE COORDINATION
Called made to [facility] and spoke with Corinne, she confirmed that pt was discharged from hospital to their facility. Ranjeet Choudhary is following while pt is in SNF. ACM will end current care coordination episode, but will be available to assist with needs once discharged from SNF.

## 2020-03-24 NOTE — TELEPHONE ENCOUNTER
Spoke to patient's spouse states patient is currently in rehab at [facility]. States will be there time on month. Spouse request to contact patient in one month after discharged from the [place] to discuss ct scan and appointment with [facility] 6/9/20.

## 2020-03-25 ENCOUNTER — CARE COORDINATION (OUTPATIENT)
Dept: CARE COORDINATION | Age: 80
End: 2020-03-25

## 2020-03-25 NOTE — CARE COORDINATION
Call to [facility] to check on pt's status. Spoke to Opal Robert with [facility] who reports that pt is still currently a resident on the rehab unit. Will continue to follow for status updates. Thanks!

## 2020-04-01 NOTE — CARE COORDINATION
Call to [facility] to check on pt's status. Spoke to Igor with [facility] who reports that pt is still currently a resident on the rehab unit. Will continue to follow for status updates. Thanks!

## 2020-04-20 ENCOUNTER — TELEPHONE (OUTPATIENT)
Dept: FAMILY MEDICINE CLINIC | Age: 80
End: 2020-04-20

## 2020-04-21 RX ORDER — POTASSIUM CHLORIDE 20 MEQ/1
20 TABLET, EXTENDED RELEASE ORAL DAILY
Qty: 30 TABLET | Refills: 3 | Status: SHIPPED | OUTPATIENT
Start: 2020-04-21 | End: 2020-07-10 | Stop reason: ALTCHOICE

## 2020-04-22 ENCOUNTER — TELEPHONE (OUTPATIENT)
Dept: FAMILY MEDICINE CLINIC | Age: 80
End: 2020-04-22

## 2020-04-23 ENCOUNTER — CARE COORDINATION (OUTPATIENT)
Dept: CASE MANAGEMENT | Age: 80
End: 2020-04-23

## 2020-04-23 PROCEDURE — 1111F DSCHRG MED/CURRENT MED MERGE: CPT | Performed by: INTERNAL MEDICINE

## 2020-04-23 NOTE — CARE COORDINATION
JULIAN MOONEY E-MAIL:     o Patient Name: Georgette Kelley  o Patient : 1940  o Facility Discharging From: [facility]  o Discharge Date: 2020  o Services at Discharge: [facility] through [address] for OT/PT and [address] or facility name if [facility] or penitentiary/ILF, etc: [address]  o Is Patient Agreeable to Calls?: no- member declined to provide verification of identity for HIPA  O Brief Background: Member is a 69 yo female with dx of COPD and Parkinsons.  She lives at home with spouse who is able/willing to assist her.   o Updated Medication List: unknown      Kalie Browning 6 / [facility]

## 2020-04-23 NOTE — CARE COORDINATION
Michael 45 Transitions Initial Follow Up Call    Call within 2 business days of discharge: Yes    Patient: Saintclair Catalina Patient : 1940   MRN: [id number]  Reason for Admission: Mechanical Fall  Discharge Date: 3/20/20 RARS: Readmission Risk Score: 28      Last Discharge [facility]       Complaint Diagnosis Description Type Department Provider    20 Fall Fall, initial encounter . .. ED to Hosp-Admission (Discharged) (ADMITTED) [facility] Geovani Gonzalez MD; Farrah. .. [place] handoff. Patient reports she is doing fine. Denies c/o pain, cp, sob, cough, congestion, n/v/c. BLE edema resolved with Lasix 40 mg, compression socks and elevation. Patient stated she has loose stools x1-2 times per day. Patient stated stools are formed/no distinctive odor.  this am. Patient preferred  medications reviewed with spouse. Not in Epic is Lasix 40 mg daily. [facility] has started care for nursing/PT/OT. Patient ambulates with a walker independently. Performs ADL's independently. Declined assistance to schedule virtual f/u. Spouse stated [facility] [place] nurse is in communication with PCP. Per patient spouse supportive. No other concerns. EDGAR Acosta RN  Care Transition Nurse [phone]           Spoke with: Krysten/Wesly [address] [place] [address] Nw:  [facility]    Non-face-to-face services provided:  Obtained and reviewed discharge summary and/or continuity of care documents    Care Transitions 24 Hour Call    Do you have any ongoing symptoms?:  No  Do you have a copy of your discharge instructions?:  Yes  Do you have all of your prescriptions and are they filled?:  Yes  Have you been contacted by a [facility] Pharmacist?:  No  Have you scheduled your follow up appointment?:  No (Comment: Declined assistance)  Were you discharged with any Home Care or Post Acute Services:  Yes  Post Acute Services:  Home Health (Comment: [facility])  Patient DME:  Shower chair, Lupe Tee, Wheelchair, Straight cane, Łaaliyah,

## 2020-05-13 ENCOUNTER — TELEPHONE (OUTPATIENT)
Dept: FAMILY MEDICINE CLINIC | Age: 80
End: 2020-05-13

## 2020-05-14 RX ORDER — FLUTICASONE PROPIONATE 50 MCG
SPRAY, SUSPENSION (ML) NASAL
Qty: 1 BOTTLE | Refills: 10 | Status: SHIPPED | OUTPATIENT
Start: 2020-05-14 | End: 2021-01-08 | Stop reason: SDUPTHER

## 2020-05-14 RX ORDER — SPIRONOLACTONE 25 MG/1
25 TABLET ORAL DAILY
Qty: 90 TABLET | Refills: 0 | Status: SHIPPED | OUTPATIENT
Start: 2020-05-14 | End: 2020-07-10 | Stop reason: ALTCHOICE

## 2020-06-02 ENCOUNTER — TELEPHONE (OUTPATIENT)
Dept: PULMONOLOGY | Age: 80
End: 2020-06-02

## 2020-06-02 NOTE — TELEPHONE ENCOUNTER
limited to the following:    Your Provider(s) may not able to provide medical treatment for your particular condition and you may be required to seek alternative healthcare or emergency care services.  The electronic systems or other security protocols or safeguards used in the Service could fail, causing a breach of privacy of your medical or other information.  Given regulatory requirements in certain jurisdictions, your Provider(s) diagnosis and/or treatment options, especially pertaining to certain prescriptions, may be limited. Acceptance   1. You understand that Services will be provided via Telehealth. This process involves the use of HIPAA compliant and secure, real-time audio-visual interfacing with a qualified and appropriately trained provider located at [facility]. 2. You understand that, under no circumstances, will this session be recorded. 3. You understand that the Provider(s) at [facility] and other clinical participants will be party to the information obtained during the Telehealth session in accordance with best medical practices. 4. You understand that the information obtained during the Telehealth session will be used to help determine the most appropriate treatment options. 5. You understand that You have the right to revoke this consent at any point in time. 6. You understand that Telehealth is voluntary, and that continued treatment is not dependent upon consent. 7. You understand that, in the event of non-consent to Telehealth services and/or technical difficulties, you will obtain services as typically provided in the absence of Telehealth technology. 8. You understand that this consent will be kept in Your medical record. 9. No potential benefits from the use of Telehealth or specific results can be guaranteed. Your condition may not be cured or improved and, in some cases, may get worse.    10. There are limitations in the terms described in the Terms of Service and this Telehealth Consent. The patient was read the following statement and has consented to the visit as of 6/2/20. The patient has been scheduled for their first telehealth visit on 6/11/20 with Dr. Stevie Briseno.

## 2020-06-09 ENCOUNTER — HOSPITAL ENCOUNTER (OUTPATIENT)
Dept: CT IMAGING | Age: 80
Discharge: HOME | End: 2020-06-09
Payer: MEDICARE

## 2020-06-09 PROCEDURE — 71250 CT THORAX DX C-: CPT

## 2020-06-11 ENCOUNTER — VIRTUAL VISIT (OUTPATIENT)
Dept: PULMONOLOGY | Age: 80
End: 2020-06-11
Payer: MEDICARE

## 2020-06-11 PROCEDURE — 99212 OFFICE O/P EST SF 10 MIN: CPT | Performed by: INTERNAL MEDICINE

## 2020-06-11 RX ORDER — SALMETEROL XINAFOATE 50 MCG
BLISTER, WITH INHALATION DEVICE INHALATION
COMMUNITY
Start: 2020-06-06 | End: 2020-06-15

## 2020-06-11 RX ORDER — FUROSEMIDE 40 MG/1
TABLET ORAL
COMMUNITY
Start: 2020-06-06 | End: 2020-07-10 | Stop reason: DRUGHIGH

## 2020-06-15 NOTE — PROGRESS NOTES
(chronic kidney disease) stage 3, GFR 30-59 ml/min (HCC)     Community acquired pneumonia of left lower lobe of lung ([facility] 9/8/2018    CRI     Detached retina, left     Diverticulosis     DJD (degenerative joint disease)     Edema     chronic    Fatty liver     GERD (gastroesophageal reflux disease)     Herniated lumbar intervertebral disc     Hx stage 2 sacral decubitus ulcer 4/13/2018    Hyperlipidemia     Hypertension     Hypothyroid 1/27/2015    Morbid obesity with BMI of 40.0-44.9, adult ([address] 2/10/2017    BETSY treated with BiPAP     Parkinson's disease ([address]     Partial symptomatic epilepsy with complex partial seizures, not intractable, without status epilepticus ([address] 1/24/2020    Restless leg syndrome     Restless legs syndrome     Rheumatic fever 1950    2x IN [place]    Sleep apnea     Tremor, essential 8/16/2010    Type 2 diabetes mellitus without complication ([facility]     Type II or unspecified type diabetes mellitus without mention of complication, not stated as uncontrolled      Past Surgical History:   Procedure Laterality Date    APPENDECTOMY      CAROTID ENDARTERECTOMY      left    CATARACT REMOVAL      CERVICAL FUSION  11/2010    CHOLECYSTECTOMY      COLONOSCOPY N/A 3/8/2020    COLONOSCOPY FLEXIBLE W/ DECOMPRESSION performed by Rl Mancini MD at [facility] Bilateral 2003    KNEE SURGERY      replacement x 2    SHOULDER SURGERY      TONSILLECTOMY      TUBAL LIGATION      UPPER GASTROINTESTINAL ENDOSCOPY  03/17/2020    Esophagitis    UPPER GASTROINTESTINAL ENDOSCOPY N/A 3/17/2020    EGD performed by Ioana Ferguson MD at [address] [place] [address]         Objective:   /64   Pulse 66   Ht 5' 6\" (1.676 m)   Wt 255 lb (115.7 kg)   SpO2 95%   BMI 41.16 kg/m²     Wt Readings from Last 3 Encounters:   06/16/20 255 lb (115.7 results for input(s): PROTIME, INR in the last 72 hours. A1C:   Lab Results   Component Value Date    LABA1C 6.4 01/24/2020     BNP:  No results for input(s): BNP in the last 72 hours. IMAGING:   Echo doppler 9.27.18   Summary   Normal left ventricular systolic function with ejection fraction of 55-60%. No regional wall motion abnormalites are seen. Mild concentric left ventricular hypertrophy. Grade I diastolic dysfunction with normal filling pressure. Mild to moderate mitral regurgitation. The left atrium is mildly dilated. Moderate tricuspid regurgitation. Systolic pulmonic artery pressure (SPAP) is estimated at 51 mmHg consistent   with mild pulmonary hypertension (Right atrial pressure of 3 mmHg). Compared to previous study from 3- no changes noted. Stress Test Tammy Pleasure) 4/25/17  Summary  There is normal isotope uptake at stress and rest. There is no evidence of  myocardial ischemia or scar. Hyperdynamic LV systolic function with YR>41%  with uniform wall motion. Low risk study. Carotid dopplers 3/16/17  Summary    1. There is moderate plaque seen in the right internal carotid artery with  an estimated diameter reduction of approaching 50%, this could be  underestimated due to calcific shadowing. 2. There is moderate plaque seen in the left internal carotid artery with an  estimated diameter reduction of approaching 50%. 3. The vertebral arteries are patent with antegrade flow bilaterally. 4. Compared to previous exam on 11/25/2015, criteria have been changed to  suggest approaching 50% stenosis in the bilateral internal carotid artery. There is no significant change from previous exam       Echo 3/17/2017:  No change from echo done 3/4/2014. Left ventricular systolic function is normal with the ejection fraction   estimated at 55%. No regional wall motion abnormalities. Grade II diastolic dysfunction with elevated filling pressure.    Mild concentric left

## 2020-06-16 ENCOUNTER — OFFICE VISIT (OUTPATIENT)
Dept: CARDIOLOGY CLINIC | Age: 80
End: 2020-06-16
Payer: MEDICARE

## 2020-06-16 VITALS
DIASTOLIC BLOOD PRESSURE: 64 MMHG | HEIGHT: 66 IN | SYSTOLIC BLOOD PRESSURE: 116 MMHG | BODY MASS INDEX: 40.98 KG/M2 | OXYGEN SATURATION: 95 % | WEIGHT: 255 LBS | HEART RATE: 66 BPM

## 2020-06-16 PROCEDURE — 1123F ACP DISCUSS/DSCN MKR DOCD: CPT | Performed by: INTERNAL MEDICINE

## 2020-06-16 PROCEDURE — 4040F PNEUMOC VAC/ADMIN/RCVD: CPT | Performed by: INTERNAL MEDICINE

## 2020-06-16 PROCEDURE — 1036F TOBACCO NON-USER: CPT | Performed by: INTERNAL MEDICINE

## 2020-06-16 PROCEDURE — G8417 CALC BMI ABV UP PARAM F/U: HCPCS | Performed by: INTERNAL MEDICINE

## 2020-06-16 PROCEDURE — G8427 DOCREV CUR MEDS BY ELIG CLIN: HCPCS | Performed by: INTERNAL MEDICINE

## 2020-06-16 PROCEDURE — 1090F PRES/ABSN URINE INCON ASSESS: CPT | Performed by: INTERNAL MEDICINE

## 2020-06-16 PROCEDURE — G8400 PT W/DXA NO RESULTS DOC: HCPCS | Performed by: INTERNAL MEDICINE

## 2020-06-16 PROCEDURE — 99214 OFFICE O/P EST MOD 30 MIN: CPT | Performed by: INTERNAL MEDICINE

## 2020-06-16 RX ORDER — LAMOTRIGINE 25 MG/1
50 TABLET ORAL 2 TIMES DAILY
COMMUNITY

## 2020-06-16 NOTE — LETTER
[facility] Office Note  6/16/2020     Subjective: Adarsh Gill is here for follow up for PAF, dCHF, HTN, HLD. [place]: Today she reports feeling good. Her  is present at the visit. They bring in a log of her BP and HR. Most of BP and pulse are in acceptable normal range. She is using a BI-pap at night with 2 L of O2. She does not need to wear O2 during the day. She is taking her medications as prescribed. Her  states she does not have good balance and uses a walker at home. Today she is in a wheelchair due to the distance from the car to the office. PMH:  She has a past medical history of PAF, dCHF, HTN, HLD as well as DM, BETSY on CPAP, CVD s/p R-CEA, CKD3, Parkinsons and COPD. Her most recent stress test on 4/25/17 showed no evidence of  myocardial ischemia or scar. Carotid dopplers 3/16/17 showed moderate plaque. An echocardiogram from 9/27/18 demonstrates an EF of 81-01%, grade I diastolic dysfunction with normal filling pressure, mild to moderate mitral regurgitation, systolic pulmonic artery pressure (SPAP) is estimated at 51 mmHg consistent  with mild pulmonary hypertension (Right atrial pressure of 3 mmHg). She uses BIPAP nightly with 2 liter of O2. Review of Systems:         12 point ROS negative in all areas as listed below except as in [place]  Constitutional, EENT, Cardiovascular, pulmonary, GI, , Musculoskeletal, skin, neurological, hematological, endocrine, Psychiatric    Reviewed past medical history, social, and family history.    Smoking none  Alcohol none  Family history reviewed not contributory    Past Medical History:   Diagnosis Date    Anemia, unspecified 2010    neg bone marrow    Asthma     Atrial fibrillation ([address]     Baker's cyst     Blind left eye     Carotid artery stenoses     Carotid stenosis 2002    left    Cervical spinal cord compression ([address] 11/2010    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-sided sciatica 8/9/2016 06/16/20 255 lb (115.7 kg)   03/13/20 281 lb (127.5 kg)   01/27/20 262 lb 4.8 oz (119 kg)       Physical Exam:  General: No Respiratory distress, appears well developed and well nourished. Eyes:  Sclera nonicteric  Nose/Sinuses:  negative findings: nose shows no deformity, asymmetry, or inflammation, nasal mucosa normal, septum midline with no perforation or bleeding  Back:  no pain to palpation  Joint:  no active joint inflammation  Musculoskeletal:  negative  Skin:  Warm and dry  Neck:  Negative for JVD and Carotid Bruits. Chest:  Clear to auscultation, respiration easy  Cardiovascular:  RRR, S1S2 normal, no murmur, no rub or thrill. Extremities:  Nonpitting  edema,  No clubbing, cyanosis,  Pulses: not palpable in feet.   Neuro: intact    Medications:   Outpatient Encounter Medications as of 6/16/2020   Medication Sig Dispense Refill    carbidopa-levodopa (SINEMET)  MG per tablet Take 1 tablet by mouth 4 times daily      Alpha-Lipoic Acid 300 MG TABS Take by mouth daily      lamoTRIgine (LAMICTAL) 25 MG tablet Take 25 mg by mouth 2 times daily 2 tabs BID      furosemide (LASIX) 40 MG tablet       SEREVENT DISKUS 50 MCG/DOSE diskus inhaler       fluticasone (FLONASE) 50 MCG/ACT nasal spray USE 2 PUFFS IN EACH NOSTRIL DAILY 1 Bottle 10    spironolactone (ALDACTONE) 25 MG tablet Take 1 tablet by mouth daily 90 tablet 0    potassium chloride (KLOR-CON M) 20 MEQ extended release tablet Take 1 tablet by mouth daily 30 tablet 3    atorvastatin (LIPITOR) 40 MG tablet Take 1 tablet by mouth daily Take 1 and 1/2 tablets by mouth every evening      hydrALAZINE (APRESOLINE) 25 MG tablet Take 1 tablet by mouth 2 times daily 180 tablet 3    apixaban (ELIQUIS) 2.5 MG TABS tablet Take 1 tablet by mouth 2 times daily 180 tablet 3    propafenone (RYTHMOL) 150 MG tablet Take 1 tablet by mouth every 8 hours 270 tablet 3    NIFEdipine (ADALAT CC) 60 MG extended release tablet Take 1 tablet by mouth daily 90 tablet 3    metoprolol tartrate (LOPRESSOR) 50 MG tablet Take 1 tablet by mouth 2 times daily 180 tablet 1    montelukast (SINGULAIR) 10 MG tablet Take 1 tablet by mouth daily. 90 tablet 1    budesonide (PULMICORT FLEXHALER) 180 MCG/ACT AEPB inhaler Inhale 2 puffs into the lungs 2 times daily 3 each 1    DULoxetine (CYMBALTA) 60 MG extended release capsule Take 1 capsule by mouth daily 90 capsule 1    Handicap Placard MISC by Does not apply route DX: G20  parkinson's disease  Exp: 9/1/2024 1 each 0    ipratropium-albuterol (DUONEB) 0.5-2.5 (3) MG/3ML SOLN nebulizer solution Inhale 3 mLs into the lungs every 6 hours as needed for Shortness of Breath DX: J45.40 360 mL 1    albuterol sulfate HFA (VENTOLIN HFA) 108 (90 Base) MCG/ACT inhaler Inhale 2 puffs into the lungs every 6 hours as needed for Wheezing 3 Inhaler 1    Multiple Vitamins-Minerals (THERAPEUTIC MULTIVITAMIN-MINERALS) tablet Take 1 tablet by mouth daily      aspirin EC 81 MG EC tablet Take 1 tablet by mouth daily. Start taking next week. Indications: OTC 30 tablet 2    lidocaine (XYLOCAINE) 5 % ointment Apply 1 Applicatorful topically as needed for Pain Apply topically as needed.  [DISCONTINUED] solifenacin (VESICARE) 5 MG tablet Take 1 tablet by mouth daily. 30 tablet 3     No facility-administered encounter medications on file as of 6/16/2020.          Lab Data:  LIPID:   Lab Results   Component Value Date    CHOL 153 02/04/2019    CHOL 186 07/03/2017    CHOL 151 11/25/2015     Lab Results   Component Value Date    TRIG 106 02/04/2019    TRIG 121 07/03/2017    TRIG 72 11/25/2015     Lab Results   Component Value Date    HDL 54 01/24/2020    HDL 66 (H) 02/04/2019    HDL 82 (H) 07/30/2018     Lab Results   Component Value Date    LDLCALC 65 01/24/2020    LDLCALC 66 02/04/2019    LDLCALC 74 07/30/2018     Lab Results   Component Value Date    LABVLDL 41 01/24/2020    LABVLDL 21 02/04/2019    LABVLDL 14 07/30/2018 Grade II diastolic dysfunction with elevated filling pressure. Mild concentric left ventricular hypertrophy with a sigmoid septum. No   outflow obstruction. Left ventricle size is normal.   Mild mitral annular calcification. Mitral valve leaflets are structurally normal.   Calcification in the chordae of the mitral valve. Mild mitral valve regurgitation. Aortic valve leaflets are mildly thickened with normal opening. No aortic valve regurgitation. Echocardiogram 9/27/18  Summary   Normal left ventricular systolic function with ejection fraction of 55-60%. No regional wall motion abnormalites are seen. Mild concentric left ventricular hypertrophy. Grade I diastolic dysfunction with normal filling pressure. Mild to moderate mitral regurgitation. The left atrium is mildly dilated. Moderate tricuspid regurgitation. Systolic pulmonic artery pressure (SPAP) is estimated at 51 mmHg consistent   with mild pulmonary hypertension (Right atrial pressure of 3 mmHg). Compared to previous study from 3- no changes noted. Assessment:  Encounter Diagnoses   Name Primary?  Paroxysmal atrial fibrillation (HCC) Yes    Chronic diastolic congestive heart failure (HCC)     Essential hypertension     Coronary artery disease involving native coronary artery of native heart without angina pectoris     Bilateral carotid artery stenosis     Hyperlipidemia, unspecified hyperlipidemia type        1. Paroxysmal atrial fibrillation   2. Chronic diastolic failure   3. Mild to moderate mitral regurgitation. 4. Mild pulmonary hypertension   5. Hyperlipidemia   -1/24/2020 , HDL 54, LDL 65,   6. Hypertension   7. Diabetes mellitis  8. Carotid artery disease   9. Obesity   10. Sleep apnea    -on BIPAP        Plan:  1. Echocardiogram to view size and strength of the heart   2. VL Carotid Bilateral Duplex   3. Labs - BMP  4.  No change in medications 5. Follow up in 9 months or sooner if needed       QUALITY MEASURES  1. Tobacco Cessation Counseling: NA  2. Retake of BP if >140/90:   NA  3. Documentation to PCP/referring for new patient:  Sent to PCP at close of office visit  4. CAD patient on anti-platelet: Yes  5. CAD patient on STATIN therapy:  Yes  6. Patient with CHF and aFib on anticoagulation:  Yes     This note was scribed in the presence of Bethel Lewis MD by Lauren Feliz RN.     I, Dr. Thi Garcia, personally performed the services described in this documentation, as scribed by the above signed scribe in my presence. It is both accurate and complete to my knowledge. I agree with the details independently gathered by the clinical support staff, while the remaining scribed note accurately describes my personal service to the patient.         Denver Pa Gupta,M.D

## 2020-07-09 ENCOUNTER — HOSPITAL ENCOUNTER (OUTPATIENT)
Dept: VASCULAR LAB | Age: 80
Discharge: HOME | End: 2020-07-09
Payer: MEDICARE

## 2020-07-09 ENCOUNTER — HOSPITAL ENCOUNTER (OUTPATIENT)
Dept: NON INVASIVE DIAGNOSTICS | Age: 80
Discharge: HOME | End: 2020-07-09
Payer: MEDICARE

## 2020-07-09 ENCOUNTER — HOSPITAL ENCOUNTER (OUTPATIENT)
Age: 80
Discharge: HOME | End: 2020-07-09
Payer: MEDICARE

## 2020-07-09 LAB
ANION GAP SERPL CALCULATED.3IONS-SCNC: 15 MMOL/L (ref 3–16)
BUN BLDV-MCNC: 40 MG/DL (ref 7–20)
CALCIUM SERPL-MCNC: 9.3 MG/DL (ref 8.3–10.6)
CHLORIDE BLD-SCNC: 99 MMOL/L (ref 99–110)
CO2: 24 MMOL/L (ref 21–32)
CREAT SERPL-MCNC: 2.7 MG/DL (ref 0.6–1.2)
GFR AFRICAN AMERICAN: 21
GFR NON-AFRICAN AMERICAN: 17
GLUCOSE BLD-MCNC: 107 MG/DL (ref 70–99)
LV EF: 55 %
LVEF MODALITY: NORMAL
POTASSIUM SERPL-SCNC: 5.9 MMOL/L (ref 3.5–5.1)
SODIUM BLD-SCNC: 138 MMOL/L (ref 136–145)

## 2020-07-09 PROCEDURE — 36415 COLL VENOUS BLD VENIPUNCTURE: CPT

## 2020-07-09 PROCEDURE — 93306 TTE W/DOPPLER COMPLETE: CPT

## 2020-07-09 PROCEDURE — 93880 EXTRACRANIAL BILAT STUDY: CPT

## 2020-07-09 PROCEDURE — 80048 BASIC METABOLIC PNL TOTAL CA: CPT

## 2020-07-10 ENCOUNTER — VIRTUAL VISIT (OUTPATIENT)
Dept: FAMILY MEDICINE CLINIC | Age: 80
End: 2020-07-10
Payer: MEDICARE

## 2020-07-10 PROCEDURE — G0439 PPPS, SUBSEQ VISIT: HCPCS | Performed by: INTERNAL MEDICINE

## 2020-07-10 PROCEDURE — 1123F ACP DISCUSS/DSCN MKR DOCD: CPT | Performed by: INTERNAL MEDICINE

## 2020-07-10 PROCEDURE — 4040F PNEUMOC VAC/ADMIN/RCVD: CPT | Performed by: INTERNAL MEDICINE

## 2020-07-10 RX ORDER — FUROSEMIDE 40 MG/1
20 TABLET ORAL DAILY
Qty: 60 TABLET | Refills: 3 | Status: SHIPPED
Start: 2020-07-10 | End: 2021-01-11 | Stop reason: SDUPTHER

## 2020-07-10 ASSESSMENT — PATIENT HEALTH QUESTIONNAIRE - PHQ9
SUM OF ALL RESPONSES TO PHQ QUESTIONS 1-9: 0
SUM OF ALL RESPONSES TO PHQ QUESTIONS 1-9: 0

## 2020-07-10 ASSESSMENT — LIFESTYLE VARIABLES: HOW OFTEN DO YOU HAVE A DRINK CONTAINING ALCOHOL: 0

## 2020-07-10 NOTE — PATIENT INSTRUCTIONS
Personalized Preventive Plan for Maddi Mayers - 7/10/2020  Medicare offers a range of preventive health benefits. Some of the tests and screenings are paid in full while other may be subject to a deductible, co-insurance, and/or copay. Some of these benefits include a comprehensive review of your medical history including lifestyle, illnesses that may run in your family, and various assessments and screenings as appropriate. After reviewing your medical record and screening and assessments performed today your provider may have ordered immunizations, labs, imaging, and/or referrals for you. A list of these orders (if applicable) as well as your Preventive Care list are included within your After Visit Summary for your review. Other Preventive Recommendations:    · A preventive eye exam performed by an eye specialist is recommended every 1-2 years to screen for glaucoma; cataracts, macular degeneration, and other eye disorders. · A preventive dental visit is recommended every 6 months. · Try to get at least 150 minutes of exercise per week or 10,000 steps per day on a pedometer . · Order or download the FREE \"Exercise & Physical Activity: Your Everyday Guide\" from [employer]. Call 3-297.667.5063 or search [employer] online. · You need 3468-3101 mg of calcium and 9467-3116 IU of vitamin D per day. It is possible to meet your calcium requirement with diet alone, but a vitamin D supplement is usually necessary to meet this goal.  · When exposed to the sun, use a sunscreen that protects against both UVA and UVB radiation with an SPF of 30 or greater. Reapply every 2 to 3 hours or after sweating, drying off with a towel, or swimming. · Always wear a seat belt when traveling in a car. Always wear a helmet when riding a bicycle or motorcycle. Heart-Healthy Diet   Sodium, Fat, and Cholesterol Controlled Diet       What Is a Heart Healthy Diet?    A heart-healthy diet is one that limits sodium , certain types of fat , and cholesterol . This type of diet is recommended for:   People with any form of cardiovascular disease (eg, coronary heart disease , peripheral vascular disease , previous heart attack , previous stroke )   People with risk factors for cardiovascular disease, such as high blood pressure , high cholesterol , or diabetes   Anyone who wants to lower their risk of developing cardiovascular disease   Sodium    Sodium is a mineral found in many foods. In general, most people consume much more sodium than they need. Diets high in sodium can increase blood pressure and lead to edema (water retention). On a heart-healthy diet, you should consume no more than 2,300 mg (milligrams) of sodium per dayabout the amount in one teaspoon of table salt. The foods highest in sodium include table salt (about 50% sodium), processed foods, convenience foods, and preserved foods. Cholesterol    Cholesterol is a fat-like, waxy substance in your blood. Our bodies make some cholesterol. It is also found in animal products, with the highest amounts in fatty meat, egg yolks, whole milk, cheese, shellfish, and organ meats. On a heart-healthy diet, you should limit your cholesterol intake to less than 200 mg per day. It is normal and important to have some cholesterol in your bloodstream. But too much cholesterol can cause plaque to build up within your arteries, which can eventually lead to a heart attack or stroke. The two types of cholesterol that are most commonly referred to are:   Low-density lipoprotein (LDL) cholesterol  Also known as bad cholesterol, this is the cholesterol that tends to build up along your arteries. Bad cholesterol levels are increased by eating fats that are saturated or hydrogenated. Optimal level of this cholesterol is less than 100. Over 130 starts to get risky for heart disease.    High-density lipoprotein (HDL) cholesterol  Also known as good example, this would mean 60 grams of fat or less per day. Saturated fat and trans fat in your diet raises your blood cholesterol the most, much more than dietary cholesterol does. For this reason, on a heart-healthy diet, less than 7% of your calories should come from saturated fat and ideally 0% from trans fat. On an 1800-calorie diet, this translates into less than 14 grams of saturated fat per day, leaving 46 grams of fat to come from mono- and polyunsaturated fats.    Food Choices on a Heart Healthy Diet   Food Category   Foods Recommended   Foods to Avoid   Grains   Breads and rolls without salted tops Most dry and cooked cereals Unsalted crackers and breadsticks Low-sodium or homemade breadcrumbs or stuffing All rice and pastas   Breads, rolls, and crackers with salted tops High-fat baked goods (eg, muffins, donuts, pastries) Quick breads, self-rising flour, and biscuit mixes Regular bread crumbs Instant hot cereals Commercially prepared rice, pasta, or stuffing mixes   Vegetables   Most fresh, frozen, and low-sodium canned vegetables Low-sodium and salt-free vegetable juices Canned vegetables if unsalted or rinsed   Regular canned vegetables and juices, including sauerkraut and pickled vegetables Frozen vegetables with sauces Commercially prepared potato and vegetable mixes   Fruits   Most fresh, frozen, and canned fruits All fruit juices   Fruits processed with salt or sodium   Milk   Nonfat or low-fat (1%) milk Nonfat or low-fat yogurt Cottage cheese, low-fat ricotta, cheeses labeled as low-fat and low-sodium   Whole milk Reduced-fat (2%) milk Malted and chocolate milk Full fat yogurt Most cheeses (unless low-fat and low salt) Buttermilk (no more than 1 cup per week)   Meats and Beans   Lean cuts of fresh or frozen beef, veal, lamb, or pork (look for the word loin) Fresh or frozen poultry without the skin Fresh or frozen fish and some shellfish Egg whites and egg substitutes (Limit whole eggs to three per week) Tofu Nuts or seeds (unsalted, dry-roasted), low-sodium peanut butter Dried peas, beans, and lentils   Any smoked, cured, salted, or canned meat, fish, or poultry (including munoz, chipped beef, cold cuts, hot dogs, sausages, sardines, and anchovies) Poultry skins Breaded and/or fried fish or meats Canned peas, beans, and lentils Salted nuts   Fats and Oils   Olive oil and canola oil Low-sodium, low-fat salad dressings and mayonnaise   Butter, margarine, coconut and palm oils, munoz fat   Snacks, Sweets, and Condiments   Low-sodium or unsalted versions of broths, soups, soy sauce, and condiments Pepper, herbs, and spices; vinegar, lemon, or lime juice Low-fat frozen desserts (yogurt, sherbet, fruit bars) Sugar, cocoa powder, honey, syrup, jam, and preserves Low-fat, trans-fat free cookies, cakes, and pies Hilario and animal crackers, fig bars, jr snaps   High-fat desserts Broth, soups, gravies, and sauces, made from instant mixes or other high-sodium ingredients Salted snack foods Canned olives Meat tenderizers, seasoning salt, and most flavored vinegars   Beverages   Low-sodium carbonated beverages Tea and coffee in moderation Soy milk   Commercially softened water   Suggestions   Make whole grains, fruits, and vegetables the base of your diet. Choose heart-healthy fats such as canola, olive, and flaxseed oil, and foods high in heart-healthy fats, such as nuts, seeds, soybeans, tofu, and fish. Eat fish at least twice per week; the fish highest in omega-3 fatty acids and lowest in mercury include salmon, herring, mackerel, sardines, and canned chunk light tuna. If you eat fish less than twice per week or have high triglycerides, talk to your doctor about taking fish oil supplements. Read food labels.    For products low in fat and cholesterol, look for fat free, low-fat, cholesterol free, saturated fat free, and trans fat freeAlso scan the Nutrition Facts Label, which lists saturated fat, trans fat, and cholesterol amounts. For products low in sodium, look for sodium free, very low sodium, low sodium, no added salt, and unsalted   Skip the salt when cooking or at the table; if food needs more flavor, get creative and try out different herbs and spices. Garlic and onion also add substantial flavor to foods. Trim any visible fat off meat and poultry before cooking, and drain the fat off after bhatti. Use cooking methods that require little or no added fat, such as grilling, boiling, baking, poaching, broiling, roasting, steaming, stir-frying, and sauting. Avoid fast food and convenience food. They tend to be high in saturated and trans fat and have a lot of added salt. Talk to a registered dietitian for individualized diet advice. Last Reviewed: March 2011 Sean Lowry MS, MPH, RD   Updated: 3/29/2011   ·     Keep Your Memory Yaya Daniel       Many factors can affect your ability to remembera hectic lifestyle, aging, stress, chronic disease, and certain medicines. But, there are steps you can take to sharpen your mind and help preserve your memory. Challenge Your Brain   Regularly challenging your mind may help keeps it in top shape. Good mental exercises include:   Crossword puzzlesUse a dictionary if you need it; you will learn more that way. Brainteasers Try some! Crafts, such as wood working and sewing   Hobbies, such as gardening and building model airplanes   SocializingVisit old friends or join groups to meet new ones. Reading   Learning a new language   Taking a class, whether it be art history or yair chi   TravelingExperience the food, history, and culture of your destination   Learning to use a computer   Going to museums, the theater, or thought-provoking movies   Changing things in your daily life, such as reversing your pattern in the grocery store or brushing your teeth using your nondominant hand   Use Memory Aids   There is no need to remember every detail on your own.  These memory aids can help:   Calendars and day planners   Electronic organizers to store all sorts of helpful informationThese devices can \"beep\" to remind you of appointments. A book of days to record birthdays, anniversaries, and other occasions that occur on the same date every year   Detailed \"to-do\" lists and strategically placed sticky notes   Quick \"study\" sessionsBefore a gathering, review who will be there so their names will be fresh in your mind. Establish routinesFor example, keep your keys, wallet, and umbrella in the same place all the time or take medicine with your 8:00 AM glass of juice   Live a Healthy Life   Many actions that will keep your body strong will do the same for your mind. For example:   Talk to Your Doctor About Herbs and Supplements    Malnutrition and vitamin deficiencies can impair your mental function. For example, vitamin B12 deficiency can cause a range of symptoms, including confusion. But, what if your nutritional needs are being met? Can herbs and supplements still offer a benefit? Researchers have investigated a range of natural remedies, such as ginkgo , ginseng , and the supplement phosphatidylserine (PS). So far, though, the evidence is inconsistent as to whether these products can improve memory or thinking. If you are interested in taking herbs and supplements, talk to your doctor first because they may interact with other medicines that you are taking. Exercise Regularly    Among the many benefits of regular exercise are increased blood flow to the brain and decreased risk of certain diseases that can interfere with memory function. One study found that even moderate exercise has a beneficial effect. Examples of \"moderate\" exercise include:   Playing 18 holes of golf once a week, without a cart   Playing tennis twice a week   Walking one mile per day   Manage Stress    It can be tough to remember what is important when your mind is cluttered.  Make time for relaxation. Choose activities that calm you down, and make it routine. Manage Chronic Conditions    Side effects of high blood pressure , diabetes, and heart disease can interfere with mental function. Many of the lifestyle steps discussed here can help manage these conditions. Strive to eat a healthy diet, exercise regularly, get stress under control, and follow your doctor's advice for your condition. Minimize Medications    Talk to your doctor about the medicines that you take. Some may be unnecessary. Also, healthy lifestyle habits may lower the need for certain drugs.      Last Reviewed: April 2010 Natasha Reed MD   Updated: 4/13/2010   ·

## 2020-07-10 NOTE — PROGRESS NOTES
Medicare Annual Wellness Visit  Name: Vandana Urbina Date: 7/10/2020   MRN: <[id number]> Sex: Female   Age: 70 y.o. Ethnicity: Non-/Non    : 1940 Race: Leona Saul is here for Medicare AWV    Screenings for behavioral, psychosocial and functional/safety risks, and cognitive dysfunction are all negative except as indicated below. These results, as well as other patient data from the [address] form, are documented in Flowsheets linked to this Encounter. Allergies   Allergen Reactions    Levaquin [Levofloxacin]      Pt was given in er and developed hives and redness    Metoclopramide     Phenazopyridine     Pyridium [Phenazopyridine Hcl]     Reglan [Metoclopramide Hcl]     Reglan [Metoclopramide Hcl]      tremors       Prior to Visit Medications    Medication Sig Taking? Authorizing Provider   SEREVENT DISKUS 50 MCG/DOSE diskus inhaler Inhale 1 puff by mouth 2 times a day.   MAHAD Wilson - CNP   carbidopa-levodopa (SINEMET)  MG per tablet Take 1 tablet by mouth 4 times daily  Historical Provider, MD   Alpha-Lipoic Acid 300 MG TABS Take by mouth daily  Historical Provider, MD   lamoTRIgine (LAMICTAL) 25 MG tablet Take 25 mg by mouth 2 times daily 2 tabs BID  Historical Provider, MD   furosemide (LASIX) 40 MG tablet   Historical Provider, MD   fluticasone (FLONASE) 50 MCG/ACT nasal spray USE 2 PUFFS IN EACH NOSTRIL DAILY  BIJAN Krause   spironolactone (ALDACTONE) 25 MG tablet Take 1 tablet by mouth daily  BIJAN Krause   potassium chloride (KLOR-CON M) 20 MEQ extended release tablet Take 1 tablet by mouth daily  Maria G Pulido MD   atorvastatin (LIPITOR) 40 MG tablet Take 1 tablet by mouth daily Take 1 and 1/2 tablets by mouth every evening  Velia Bronson MD   hydrALAZINE (APRESOLINE) 25 MG tablet Take 1 tablet by mouth 2 times daily  Marty Villagomez MD   apixaban (ELIQUIS) 2.5 MG TABS tablet Take 1 tablet by mouth 2 times daily  Anjali Bell MD   propafenone (RYTHMOL) 150 MG tablet Take 1 tablet by mouth every 8 hours  Anjali Bell MD   NIFEdipine (ADALAT CC) 60 MG extended release tablet Take 1 tablet by mouth daily  Anjali Bell MD   metoprolol tartrate (LOPRESSOR) 50 MG tablet Take 1 tablet by mouth 2 times daily  Lacey Martino MD   montelukast (SINGULAIR) 10 MG tablet Take 1 tablet by mouth daily. Bladimir Landon MD   budesonide (PULMICORT FLEXHALER) 180 MCG/ACT AEPB inhaler Inhale 2 puffs into the lungs 2 times daily  Lacey Martino MD   DULoxetine (CYMBALTA) 60 MG extended release capsule Take 1 capsule by mouth daily  BIJAN Anderson   Handicap Placard MISC by Does not apply route DX: Chad Jones  parkinson's disease  Exp: 9/1/2024  BIJAN Anderson   lidocaine (XYLOCAINE) 5 % ointment Apply 1 Applicatorful topically as needed for Pain Apply topically as needed. Historical Provider, MD   ipratropium-albuterol (DUONEB) 0.5-2.5 (3) MG/3ML SOLN nebulizer solution Inhale 3 mLs into the lungs every 6 hours as needed for Shortness of Breath DX: J45.40  BIJAN Anderson   albuterol sulfate HFA (VENTOLIN HFA) 108 (90 Base) MCG/ACT inhaler Inhale 2 puffs into the lungs every 6 hours as needed for Wheezing  Bladimir Landon MD   Multiple Vitamins-Minerals (THERAPEUTIC MULTIVITAMIN-MINERALS) tablet Take 1 tablet by mouth daily  Historical Provider, MD   solifenacin (VESICARE) 5 MG tablet Take 1 tablet by mouth daily. Bladimir Landon MD   aspirin EC 81 MG EC tablet Take 1 tablet by mouth daily. Start taking next week.   Indications: OTC  Pedro Nixon MD       Past Medical History:   Diagnosis Date    Anemia, unspecified 2010    neg bone marrow    Asthma     Atrial fibrillation ([facility] [address]     Baker's cyst     Blind left eye     Carotid artery stenoses     Carotid stenosis 2002    left    Cervical spinal cord compression ([facility] [address] 11/2010    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-sided sciatica 8/9/2016    CKD (chronic kidney disease) stage 3, GFR 30-59 ml/min (HCC)     Community acquired pneumonia of left lower lobe of lung ([facility] [address] 9/8/2018    CRI     Detached retina, left     Diverticulosis     DJD (degenerative joint disease)     Edema     chronic    Fatty liver     GERD (gastroesophageal reflux disease)     Herniated lumbar intervertebral disc     Hx stage 2 sacral decubitus ulcer 4/13/2018    Hyperlipidemia     Hypertension     Hypothyroid 1/27/2015    Morbid obesity with BMI of 40.0-44.9, adult ([facility] [address] 2/10/2017    BETSY treated with BiPAP     Parkinson's disease ([facility] [address] [facility]     Partial symptomatic epilepsy with complex partial seizures, not intractable, without status epilepticus ([facility] [address] 1/24/2020    Restless leg syndrome     Restless legs syndrome     Rheumatic fever 1950    [place]x [place]    Sleep apnea     Tremor, essential 8/16/2010    Type 2 diabetes mellitus without complication [facility]     Type II or unspecified type diabetes mellitus without mention of complication, not stated as uncontrolled        Past Surgical History:   Procedure Laterality Date    APPENDECTOMY      CAROTID ENDARTERECTOMY      left    CATARACT REMOVAL      CERVICAL FUSION  11/2010    CHOLECYSTECTOMY      COLONOSCOPY N/A 3/8/2020    COLONOSCOPY FLEXIBLE W/ DECOMPRESSION performed by Anjana Matias MD at [address]      JOINT REPLACEMENT Bilateral 2003    KNEE SURGERY      replacement x 2    SHOULDER SURGERY      TONSILLECTOMY      TUBAL LIGATION      UPPER GASTROINTESTINAL ENDOSCOPY  03/17/2020    Esophagitis    UPPER GASTROINTESTINAL ENDOSCOPY N/A 3/17/2020    EGD performed by Dana Bauer MD at [address] [facility]         Family History   Problem Relation Age of Onset    Diabetes Mother     Heart Disease Mother     Diabetes Father     Heart Disease Father     Diabetes Sister        [facility] (Including outside providers/suppliers regularly involved in providing care):   Patient Care Team:  Raissa Schulz MD as PCP - Arpit Robert MD as PCP - [facility]  Jennifer Markham MD as Consulting Physician (Pulmonology)  Gustavo Brown DPM as Consulting Physician (Podiatry)  Jose Montoya MD as Consulting Physician (Nephrology)  Vero Woo MD as Consulting Physician (Cardiology)  MAHAD Amezquita CNP as Nurse Practitioner (Family Nurse Practitioner)    Wt Readings from Last 3 Encounters:   06/16/20 255 lb (115.7 kg)   03/13/20 281 lb (127.5 kg)   01/27/20 262 lb 4.8 oz (119 kg)     There were no vitals filed for this visit. There is no height or weight on file to calculate BMI. Based upon direct observation of the patient, evaluation of cognition reveals recent and remote memory intact. Patient's complete Health Risk Assessment and screening values have been reviewed and are found in Flowsheets. The following problems were reviewed today and where indicated follow up appointments were made and/or referrals ordered. Positive Risk Factor Screenings with Interventions:     Fall Risk:  2 or more falls in past year?: (!) yes  Fall with injury in past year?: no  Fall Risk Interventions:    · Home safety tips provided    Health Habits/Nutrition:  Health Habits/Nutrition  Do you exercise for at least 20 minutes 2-3 times per week?: (!) No  Have you lost any weight without trying in the past 3 months?: No  Do you eat fewer than 2 meals per day?: No  Have you seen a dentist within the past year?: (!) No  There is no height or weight on file to calculate BMI. Health Habits/Nutrition Interventions:  · Inadequate physical activity:  educational materials provided to promote increased physical activity  · Dental exam overdue:  patient declines dental evaluation    ADL:  ADLs  In the past 7 days, did you need help from others to perform any of the following everyday activities? Eating, dressing, grooming, bathing, toileting, or walking/balance?: (!) Bathing  In the past 7 days, did you need help from others to take care of any of the following?  Laundry, housekeeping, banking/finances, shopping, telephone use, food preparation, transportation, or taking medications?: (!) Transportation, Food Preparation, Shopping, Banking/Finances, Housekeeping, Laundry  ADL Interventions:  · Patient declines any further evaluation/treatment for this issue  ·  does     Personalized Preventive Plan   Current Health Maintenance Status  Immunization History   Administered Date(s) Administered    Hepatitis A 05/08/2019    Hepatitis A Adult (Havrix, Vaqta) 05/08/2019, 12/17/2019    Hepatitis A Adult (Vaqta) 05/08/2019    Influenza A (R1T1-12) Vaccine PF IM 01/23/2010    Influenza Vaccine, unspecified formulation 08/09/2016, 09/01/2017    Influenza Virus Vaccine 09/27/2011, 10/01/2012, 08/09/2016    Influenza Whole 09/02/2015    Influenza, High Dose (Fluzone 65 yrs and older) 09/09/2015, 08/15/2016, 09/15/2017, 09/14/2018    Influenza, Triv, inactivated, subunit, adjuvanted, IM (Fluad 65 yrs and older) 09/27/2019    Pneumococcal Conjugate 13-valent ([id number]) 07/28/2015    Pneumococcal Polysaccharide ([id number]) 11/26/2007    Zoster Recombinant (Shingrix) 05/08/2019, 12/17/2019        Health Maintenance   Topic Date Due    DTaP/Tdap/Td vaccine (1 - Tdap) 07/23/1959    Annual Wellness Visit (AWV)  05/29/2019    Flu vaccine (1) 09/01/2020    Lipid screen  01/24/2021    TSH testing  03/07/2021    Potassium monitoring  07/09/2021    Creatinine monitoring  07/09/2021    DEXA (modify frequency per FRAX score)  Completed    Shingles Vaccine  Completed    Pneumococcal 65+ yrs at Risk Vaccine  Completed    Hepatitis A vaccine  Aged Out    Hib vaccine  Aged Out    Meningococcal (ACWY) vaccine  Aged Out     Recommendations for TrackIF Due: see orders and patient

## 2020-07-13 RX ORDER — MONTELUKAST SODIUM 10 MG/1
TABLET ORAL
Qty: 90 TABLET | Refills: 1 | Status: SHIPPED | OUTPATIENT
Start: 2020-07-13 | End: 2020-12-31 | Stop reason: SDUPTHER

## 2020-07-13 RX ORDER — DULOXETIN HYDROCHLORIDE 60 MG/1
60 CAPSULE, DELAYED RELEASE ORAL DAILY
Qty: 90 CAPSULE | Refills: 0 | Status: SHIPPED | OUTPATIENT
Start: 2020-07-13 | End: 2020-10-09

## 2020-07-13 NOTE — TELEPHONE ENCOUNTER
Refill Request     Last Seen: 7/10/20    Last Written: 1/24/20 with 1 refill     Next Appointment:   Future Appointments   Date Time Provider Katy Ade   11/25/2020  9:30 AM MAHAD Jo CNP [facility]   12/16/2020  9:30 AM MHC [facility]   12/16/2020 10:30 [facility]             Requested Prescriptions     Pending Prescriptions Disp Refills    montelukast (SINGULAIR) 10 MG tablet [Pharmacy Med Name: Montelukast Sodium 10mg Tablet] 90 tablet 0     Sig: Take 1 tablet by mouth daily.

## 2020-07-13 NOTE — TELEPHONE ENCOUNTER
Refill Request     Last Seen: 7/10/2020 Virtual Visit 1/24/2020    Last Written: 1/15/2020     Next Appointment:   Future Appointments   Date Time Provider Katy Sorianoi   11/25/2020  9:30 AM Erma Cover, APRN - CNP [facility]   12/16/2020  9:30 AM [facility]   12/16/2020 10:30 AM Caitlin Spain MD [facility]             Requested Prescriptions     Pending Prescriptions Disp Refills    DULoxetine (CYMBALTA) 60 MG extended release capsule [Pharmacy Med Name: Duloxetine 60mg Delayed-Release Capsule] 90 capsule 0     Sig: Take 1 capsule by mouth daily

## 2020-07-28 ENCOUNTER — HOSPITAL ENCOUNTER (OUTPATIENT)
Age: 80
Discharge: HOME | End: 2020-07-28
Payer: MEDICARE

## 2020-07-28 LAB
ALBUMIN SERPL-MCNC: 3.9 G/DL (ref 3.4–5)
ANION GAP SERPL CALCULATED.3IONS-SCNC: 20 MMOL/L (ref 3–16)
BACTERIA: ABNORMAL /HPF
BILIRUBIN URINE: NEGATIVE
BLOOD, URINE: NEGATIVE
BUN BLDV-MCNC: 26 MG/DL (ref 7–20)
CALCIUM SERPL-MCNC: 9.1 MG/DL (ref 8.3–10.6)
CHLORIDE BLD-SCNC: 100 MMOL/L (ref 99–110)
CLARITY: ABNORMAL
CO2: 23 MMOL/L (ref 21–32)
COLOR: YELLOW
CREAT SERPL-MCNC: 1.8 MG/DL (ref 0.6–1.2)
CREATININE URINE: 181.5 MG/DL (ref 28–259)
EPITHELIAL CELLS, UA: ABNORMAL /HPF (ref 0–5)
GFR AFRICAN AMERICAN: 33
GFR NON-AFRICAN AMERICAN: 27
GLUCOSE BLD-MCNC: 106 MG/DL (ref 70–99)
GLUCOSE URINE: NEGATIVE MG/DL
HCT VFR BLD CALC: 32.8 % (ref 36–48)
HEMOGLOBIN: 10.6 G/DL (ref 12–16)
HYALINE CASTS: ABNORMAL /LPF (ref 0–2)
KETONES, URINE: ABNORMAL MG/DL
LEUKOCYTE ESTERASE, URINE: ABNORMAL
MCH RBC QN AUTO: 26.8 PG (ref 26–34)
MCHC RBC AUTO-ENTMCNC: 32.2 G/DL (ref 31–36)
MCV RBC AUTO: 83.4 FL (ref 80–100)
MICROSCOPIC EXAMINATION: YES
NITRITE, URINE: NEGATIVE
PDW BLD-RTO: 14.9 % (ref 12.4–15.4)
PH UA: 6 (ref 5–8)
PHOSPHORUS: 3.6 MG/DL (ref 2.5–4.9)
PLATELET # BLD: 323 K/UL (ref 135–450)
PMV BLD AUTO: 9.6 FL (ref 5–10.5)
POTASSIUM SERPL-SCNC: 4.5 MMOL/L (ref 3.5–5.1)
PROTEIN PROTEIN: 181 MG/DL
PROTEIN UA: 100 MG/DL
RBC # BLD: 3.94 M/UL (ref 4–5.2)
RBC UA: ABNORMAL /HPF (ref 0–4)
SODIUM BLD-SCNC: 143 MMOL/L (ref 136–145)
SPECIFIC GRAVITY UA: 1.02 (ref 1–1.03)
URINE REFLEX TO CULTURE: YES
URINE TYPE: ABNORMAL
UROBILINOGEN, URINE: 0.2 E.U./DL
VITAMIN D 25-HYDROXY: 44.4 NG/ML
WBC # BLD: 12.3 K/UL (ref 4–11)
WBC UA: ABNORMAL /HPF (ref 0–5)

## 2020-07-28 PROCEDURE — 87086 URINE CULTURE/COLONY COUNT: CPT

## 2020-07-28 PROCEDURE — 85027 COMPLETE CBC AUTOMATED: CPT

## 2020-07-28 PROCEDURE — 80069 RENAL FUNCTION PANEL: CPT

## 2020-07-28 PROCEDURE — 87077 CULTURE AEROBIC IDENTIFY: CPT

## 2020-07-28 PROCEDURE — 84156 ASSAY OF PROTEIN URINE: CPT

## 2020-07-28 PROCEDURE — 82570 ASSAY OF URINE CREATININE: CPT

## 2020-07-28 PROCEDURE — 81001 URINALYSIS AUTO W/SCOPE: CPT

## 2020-07-28 PROCEDURE — 87186 SC STD MICRODIL/AGAR DIL: CPT

## 2020-07-28 PROCEDURE — 36415 COLL VENOUS BLD VENIPUNCTURE: CPT

## 2020-07-28 PROCEDURE — 82306 VITAMIN D 25 HYDROXY: CPT

## 2020-07-30 ENCOUNTER — TELEPHONE (OUTPATIENT)
Dept: CARDIOLOGY CLINIC | Age: 80
End: 2020-07-30

## 2020-07-30 LAB
ORGANISM: ABNORMAL
URINE CULTURE, ROUTINE: ABNORMAL
URINE CULTURE, ROUTINE: ABNORMAL

## 2020-07-30 NOTE — TELEPHONE ENCOUNTER
Pt  called, had blood drawn earlier this week, didn't know if that was what [facility] needed as a follow up. Please call and let them know.

## 2020-07-30 NOTE — TELEPHONE ENCOUNTER
Component  Value  Ref Range & Units  Status  Collected  Lab    Sodium  143  136 - 145 mmol/L  Final  07/28/2020 12:54 PM  [facility]    Potassium  4.5  3.5 - 5.1 mmol/L  Final  07/28/2020 12:54 PM  [facility]    Chloride  100  99 - 110 mmol/L  Final  07/28/2020 12:54 PM  [facility]    CO2  23  21 - 32 mmol/L  Final  07/28/2020 12:54 PM  [address] [facility]    Anion Gap  20High    3 - 16  Final  07/28/2020 12:54 PM  [address] [facility] [address] [facility]    Glucose  106High    70 - 99 mg/dL  Final  07/28/2020 12:54 PM  [facility]    BUN  26High    7 - 20 mg/dL  Final  07/28/2020 12:54 PM   - [address] [place] [address]  1.8High    0.6 - 1.2 mg/dL  Final  07/28/2020 12:54 PM  [facility]    GFR Non-  27Abnormal    >60  Final  07/28/2020 12:54 PM  [address] [facility]    >60 mL/min/1.73m2 EGFR, calc. for ages 25 and older using the   MDRD formula (not corrected for weight), is valid for stable   renal function.     GFR   33Abnormal            Yes this was drawn on 7/28/20 after meds adjusted please advise

## 2020-08-21 ENCOUNTER — HOSPITAL ENCOUNTER (OUTPATIENT)
Dept: VASCULAR LAB | Age: 80
Discharge: HOME | End: 2020-08-21
Payer: MEDICARE

## 2020-08-21 ENCOUNTER — TELEPHONE (OUTPATIENT)
Dept: FAMILY MEDICINE CLINIC | Age: 80
End: 2020-08-21

## 2020-08-21 ENCOUNTER — OFFICE VISIT (OUTPATIENT)
Dept: FAMILY MEDICINE CLINIC | Age: 80
End: 2020-08-21
Payer: MEDICARE

## 2020-08-21 VITALS
HEIGHT: 66 IN | DIASTOLIC BLOOD PRESSURE: 68 MMHG | BODY MASS INDEX: 41.16 KG/M2 | HEART RATE: 64 BPM | SYSTOLIC BLOOD PRESSURE: 142 MMHG | TEMPERATURE: 97.5 F | OXYGEN SATURATION: 93 %

## 2020-08-21 PROCEDURE — G8400 PT W/DXA NO RESULTS DOC: HCPCS | Performed by: INTERNAL MEDICINE

## 2020-08-21 PROCEDURE — 4040F PNEUMOC VAC/ADMIN/RCVD: CPT | Performed by: INTERNAL MEDICINE

## 2020-08-21 PROCEDURE — 93971 EXTREMITY STUDY: CPT

## 2020-08-21 PROCEDURE — G8417 CALC BMI ABV UP PARAM F/U: HCPCS | Performed by: INTERNAL MEDICINE

## 2020-08-21 PROCEDURE — G8427 DOCREV CUR MEDS BY ELIG CLIN: HCPCS | Performed by: INTERNAL MEDICINE

## 2020-08-21 PROCEDURE — 1090F PRES/ABSN URINE INCON ASSESS: CPT | Performed by: INTERNAL MEDICINE

## 2020-08-21 PROCEDURE — 99213 OFFICE O/P EST LOW 20 MIN: CPT | Performed by: INTERNAL MEDICINE

## 2020-08-21 PROCEDURE — 1036F TOBACCO NON-USER: CPT | Performed by: INTERNAL MEDICINE

## 2020-08-21 PROCEDURE — 1123F ACP DISCUSS/DSCN MKR DOCD: CPT | Performed by: INTERNAL MEDICINE

## 2020-08-21 NOTE — TELEPHONE ENCOUNTER
Left leg knot in the back of leg just below knee  Swollen and when she walks it feels like leg is going out on her. Knot is painful with pressure and aches when seated. Painful when walking. Denies falls  Denies redness, purple or heat at site but left leg is warmer than right according to   No defined shape - 'puffed out'  'Squishy'  - soft    Per PCP - work in at [phone] today for Same Day appt. Communicated plan to patient. Patient voices understanding.    Future Appointments   Date Time Provider Katy Booker   8/21/2020 11:30 AM Ansley Woo MD [facility]   11/25/2020  9:30 AM MAHAD Brito - CNP [facility]   12/16/2020  9:30 AM [facility]   12/16/2020 10:30 [facility]

## 2020-08-31 PROBLEM — D50.0 ANEMIA DUE TO CHRONIC BLOOD LOSS: Status: RESOLVED | Noted: 2019-07-15 | Resolved: 2020-08-31

## 2020-08-31 PROBLEM — R52 INTRACTABLE PAIN: Status: RESOLVED | Noted: 2020-03-01 | Resolved: 2020-08-31

## 2020-08-31 PROBLEM — E44.0 MODERATE PROTEIN-CALORIE MALNUTRITION (HCC): Status: RESOLVED | Noted: 2020-03-18 | Resolved: 2020-08-31

## 2020-08-31 PROBLEM — J96.01 ACUTE RESPIRATORY FAILURE WITH HYPOXEMIA (HCC): Status: RESOLVED | Noted: 2019-05-22 | Resolved: 2020-08-31

## 2020-08-31 PROBLEM — C44.319 BASAL CELL CARCINOMA OF LEFT CHEEK: Status: RESOLVED | Noted: 2019-03-12 | Resolved: 2020-08-31

## 2020-08-31 PROBLEM — J18.9 PNEUMONIA DUE TO INFECTIOUS ORGANISM: Status: RESOLVED | Noted: 2019-06-10 | Resolved: 2020-08-31

## 2020-08-31 ASSESSMENT — ENCOUNTER SYMPTOMS: SHORTNESS OF BREATH: 0

## 2020-08-31 NOTE — PROGRESS NOTES
2020     Conchita Senior (:  1940) is a 68 y.o. female, here for evaluation of the following medical concerns:  Chief Complaint   Patient presents with    Mass     mass on left leg, noticed last week, painful       HPI  Arty Muzzy was seen today for mass. Diagnoses and all orders for this visit:    Left leg swelling  -     Cancel: US DUP LOWER EXTREMITY LEFT KIA; Future  -     US DUP LOWER EXTREMITY LEFT KIA; Future    At high risk for falls  -     US DUP LOWER EXTREMITY LEFT KIA; Future          Review of Systems   Constitutional: Negative for fatigue. Respiratory: Negative for shortness of breath. Cardiovascular: Negative for chest pain. Prior to Visit Medications    Medication Sig Taking? Authorizing Provider   metoprolol tartrate (LOPRESSOR) 50 MG tablet Take 1 tablet by mouth 2 times daily Yes MAHAD Cunha CNP   DULoxetine (CYMBALTA) 60 MG extended release capsule Take 1 capsule by mouth daily Yes Bladimir Landon MD   montelukast (SINGULAIR) 10 MG tablet Take 1 tablet by mouth daily. Yes Bladimir Landon MD   furosemide (LASIX) 40 MG tablet 0.5 tablets daily Yes Anjali Bell MD   SEREVENT DISKUS 50 MCG/DOSE diskus inhaler Inhale 1 puff by mouth 2 times a day.  Yes MAHAD Cunha CNP   carbidopa-levodopa (SINEMET)  MG per tablet Take 1 tablet by mouth 4 times daily Yes Historical Provider, MD   Alpha-Lipoic Acid 300 MG TABS Take by mouth daily Yes Historical Provider, MD   lamoTRIgine (LAMICTAL) 25 MG tablet Take 25 mg by mouth 2 times daily 2 tabs BID Yes Historical Provider, MD   fluticasone (FLONASE) 50 MCG/ACT nasal spray USE 2 PUFFS IN EACH NOSTRIL DAILY Yes BIJAN Anderson   atorvastatin (LIPITOR) 40 MG tablet Take 1 tablet by mouth daily Take 1 and 1/2 tablets by mouth every evening Yes Tereso Toney MD   hydrALAZINE (APRESOLINE) 25 MG tablet Take 1 tablet by mouth 2 times daily Yes Anjali Bell MD   apixaban (ELIQUIS) 2.5 MG TABS tablet Take 1 tablet by mouth 2 times daily Yes Tricia Sanchez MD   propafenone (RYTHMOL) 150 MG tablet Take 1 tablet by mouth every 8 hours Yes Tricia Sanchez MD   NIFEdipine (ADALAT CC) 60 MG extended release tablet Take 1 tablet by mouth daily Yes Tricia Sanchez MD   budesonide (PULMICORT FLEXHALER) 180 MCG/ACT AEPB inhaler Inhale 2 puffs into the lungs 2 times daily Yes Himanshu Gomez MD   Handicap Tony MISC by Does not apply route DX: Savannah Grandchild  parkinson's disease  Exp: 9/1/2024 Yes BIJAN Stafford   lidocaine (XYLOCAINE) 5 % ointment Apply 1 Applicatorful topically as needed for Pain Apply topically as needed. Yes Historical Provider, MD   ipratropium-albuterol (DUONEB) 0.5-2.5 (3) MG/3ML SOLN nebulizer solution Inhale 3 mLs into the lungs every 6 hours as needed for Shortness of Breath DX: J45.40 Yes BIJAN Stafford   albuterol sulfate HFA (VENTOLIN HFA) 108 (90 Base) MCG/ACT inhaler Inhale 2 puffs into the lungs every 6 hours as needed for Wheezing Yes Himanshu Gomez MD   Multiple Vitamins-Minerals (THERAPEUTIC MULTIVITAMIN-MINERALS) tablet Take 1 tablet by mouth daily Yes Historical Provider, MD   aspirin EC 81 MG EC tablet Take 1 tablet by mouth daily. Start taking next week. Indications: OTC Yes Quynh Olivera MD   solifenacin (VESICARE) 5 MG tablet Take 1 tablet by mouth daily. Himanshu Gomez MD        Social History     Tobacco Use    Smoking status: Never Smoker    Smokeless tobacco: Never Used   Substance Use Topics    Alcohol use: No     Alcohol/week: 0.0 standard drinks        Vitals:    08/21/20 1135   BP: (!) 142/68   Site: Left Upper Arm   Position: Sitting   Cuff Size: Large Adult   Pulse: 64   Temp: 97.5 °F (36.4 °C)   SpO2: 93%   Height: 5' 6\" (1.676 m)     Estimated body mass index is 41.16 kg/m² as calculated from the following:    Height as of this encounter: 5' 6\" (1.676 m). Weight as of 6/16/20: 255 lb (115.7 kg). Physical Exam  Vitals signs reviewed. Cardiovascular:      Rate and Rhythm: Normal rate. Pulses: Normal pulses. Pulmonary:      Effort: Pulmonary effort is normal.      Breath sounds: Normal breath sounds. Musculoskeletal:         General: Tenderness (and focal swelling posterior calf, no warmth or discoloration) present. ASSESSMENT/PLAN:  Mini Mckee was seen today for mass. Diagnoses and all orders for this visit:    Left leg swelling  -     Cancel: US DUP LOWER EXTREMITY LEFT KIA; Future  -     US DUP LOWER EXTREMITY LEFT KIA; Future    At high risk for falls  -     US DUP LOWER EXTREMITY LEFT KIA; Future    new, suspect venous superficial occlusion vs muscle strain. Given sedentary, could also have DVT, check u/s      Return in about 2 weeks (around 9/4/2020), or if symptoms worsen or fail to improve. An electronic signature was used to authenticate this note.     --Vandana Merrill MD on 8/31/2020 at 9:46 AM

## 2020-09-08 RX ORDER — BUDESONIDE 180 UG/1
AEROSOL, POWDER RESPIRATORY (INHALATION)
Qty: 3 EACH | Refills: 3 | Status: SHIPPED | OUTPATIENT
Start: 2020-09-08 | End: 2020-12-31 | Stop reason: SDUPTHER

## 2020-09-08 NOTE — TELEPHONE ENCOUNTER
Refill Request     Last Seen: 8/21/2020    Last Written: 1/24/2020    Next Appointment:   Future Appointments   Date Time Provider Katy Booker   11/25/2020  9:20 AM MAHAD Saldivar - CNP [facility]   12/16/2020  9:30 AM [facility]IN [facility] [place] [facility]   12/16/2020 10:30 AM Jenny Zuniga MD [facility]   2/26/2021  9:00 AM Pritesh Wall MD [facility]             Requested Prescriptions     Pending Prescriptions Disp Refills    PULMICORT FLEXHALER 180 MCG/ACT AEPB inhaler [Pharmacy Med Name: Pulmicort Flexhaler 180mcg/actuation Powder for Inhalation]  0     Sig: Inhale 2 puffs into the lungs 2 times daily     d

## 2020-10-05 ENCOUNTER — HOSPITAL ENCOUNTER (OUTPATIENT)
Dept: WOMENS IMAGING | Age: 80
Discharge: HOME | End: 2020-10-05
Payer: MEDICARE

## 2020-10-05 PROCEDURE — 77067 SCR MAMMO BI INCL CAD: CPT

## 2020-10-06 NOTE — TELEPHONE ENCOUNTER
.  Last office visit 8/21/2020     Last written 7- 90 with 0      Next office visit scheduled 2/26/2021    Requested Prescriptions     Pending Prescriptions Disp Refills    DULoxetine (CYMBALTA) 60 MG extended release capsule [Pharmacy Med Name: Duloxetine 60mg Delayed-Release Capsule] 90 capsule 0     Sig: Take 1 capsule by mouth daily

## 2020-10-09 RX ORDER — DULOXETIN HYDROCHLORIDE 60 MG/1
60 CAPSULE, DELAYED RELEASE ORAL DAILY
Qty: 90 CAPSULE | Refills: 0 | Status: SHIPPED | OUTPATIENT
Start: 2020-10-09 | End: 2020-12-31 | Stop reason: SDUPTHER

## 2020-10-29 ENCOUNTER — HOSPITAL ENCOUNTER (OUTPATIENT)
Dept: GENERAL RADIOLOGY | Age: 80
Discharge: HOME | End: 2020-10-29
Payer: MEDICARE

## 2020-10-29 ENCOUNTER — HOSPITAL ENCOUNTER (OUTPATIENT)
Dept: SPEECH THERAPY | Age: 80
Setting detail: THERAPIES SERIES
Discharge: HOME | End: 2020-10-29
Payer: MEDICARE

## 2020-10-29 PROCEDURE — 92526 ORAL FUNCTION THERAPY: CPT

## 2020-10-29 PROCEDURE — 92611 MOTION FLUOROSCOPY/SWALLOW: CPT

## 2020-10-29 PROCEDURE — 74230 X-RAY XM SWLNG FUNCJ C+: CPT

## 2020-10-29 NOTE — LETTER
unspecified type diabetes mellitus without mention of complication, not stated as uncontrolled. Past Surgical History:  has a past surgical history that includes Tonsillectomy; Hysterectomy; Cholecystectomy; Tubal ligation; knee surgery; hernia repair; eye surgery; Cataract removal; vitrectomy; Corneal transplant; Carotid endarterectomy; Appendectomy; cervical fusion (11/2010); shoulder surgery; joint replacement (Bilateral, 2003); Colonoscopy (N/A, 3/8/2020); Upper gastrointestinal endoscopy (03/17/2020); and Upper gastrointestinal endoscopy (N/A, 3/17/2020). Current Diet Solid Consistency: Regular  Current Diet Liquid Consistency: Thin       Type of Study: Initial MBS       Patient Complaints/Reason for Referral:  Kehinde Tsai was referred for a MBS to assess the efficiency of his/her swallow function, assess for aspiration, and to make recommendations regarding safe dietary consistencies, effective compensatory strategies, and safe eating environment. Patient complaints: 68year old female with Hx of PD, complaining of difficulty chewing and swallow coarse solids. Pt reports that solids occasional cause her to cough after swallow. No recent Hx pneumonia, per pt. General Comment  Comments: 68year old female with Hx of PD, complaining of difficulty chewing and swallow coarse solids. Pt reports that solids occasional cause her to cough after swallow. No recent Hx pneumonia, per pt. Behavior/Cognition/Vision/Hearing:  Behavior/Cognition: Alert; Cooperative;Pleasant mood  Vision: Impaired  Vision Exceptions: Cataracts  Hearing: Within functional limits    General:  Treatment Dx and ICD 10: dysphagia   Patient Position: Lateral and Patient Degrees: pt sitting upright in MBS chair throughout study  Consistencies Administered: Reg solid; Dysphagia Pureed (Dysphagia I); Thin cup; Thin teaspoon; Thin straw;Nectar cup  Compensatory Swallowing Strategies Attempted: Eat/Feed slowly;Upright as posterior pharyngeal wall retention, mild-mod tongue base retention (decreases significantly with small sip of thin liquid to clear). Of note, pt took large sip of thin liquid following intial cracker trial in an effort to clear residuals, and there was premature loss of partial bolus to pharynx with tracheal aspiration of liquid before swallow. Pt exhibits prompt coughing after aspiration, and coughing appears to clear aspirated liquid. Upper Esophageal Screen: Prominent cricopharyngeus noted with possible retention of po trials in esophagus. Recommend GI consult if assessment of esophageal motility is desired. Dysphagia Outcome Severity Scale: Level 4: Mild moderate dysphagia- Intermittent supervision/cueing. One - two diet consistencies restricted  Penetration-Aspiration Scale (PAS): 6 - Material enters the airway, passes below the vocal folds, and is ejected into the larynx or out of the airway    Recommended Diet:  Solid consistency: Regular (Pt to choose softer, moister solids)  Liquid consistency:  Thin  Liquid administration via: Cup    Medication administration: PO    Safe Swallow Protocol:  Supervision: 1:1  Compensatory Swallowing Strategies:   · Medications in puree  · No straws;  · Eat/Feed slowly;  · Alternate solids and liquids;  · Upright as possible for all oral intake;  · Remain upright for 30-45 minutes after meals;  · Small bites/sips      Recommendations/Treatment  Requires SLP Intervention: Yes(if pt and spouse wish to pursue outpatient dysphagia treatment, pt would be a good candidate for this)       Education: Images and recommendations were reviewed with pt, spouse following this exam.   Patient Education: Dysphagia Tx: Extensive education with pt and spouse regarding exact nature of pharyngeal phase deficits, aspiration precautions to follow, recommendation for consideration of outpatient dysphagia treatment, as well as consideration of GI consult in light of possible esophageal dysmotility  Patient Education Response: Verbalizes understanding;Needs reinforcement    Prognosis  Prognosis for safe diet advancement: good  Barriers to reach goals: cognitive deficits  Safety Devices  Safety Devices in place: Yes  Type of devices: All fall risk precautions in place; Left in chair(with spouse)        Oral Preparation / Oral Phase  Impaired  Major Contributing Deficits  · Weak Lingual Manipulation  · Lingual Rocking  · Reduced Bolus Control  · Decreased Bolus Cohesion  Comment: Mild-moderate oral phase dysphagia chracterized by lingual incoordination with pt exhibiting lingual rocking and repetitive incoordinated movements of tongue prior to final A-P transmission of bolus to posterior oral cavity for swallow onset. There is decreased bolus cohesion noted with pt losing thin liquids to floor of mouth during cued bolus hold. There is also mild sublingual pooling of thin liquids post swallow. There was one instance of premature loss of thin liquid bolus to pharynx. This occurred when pt took large sip of thin liquid following intial cracker trial in an effort to clear residuals. There was premature loss of partial bolus to pharynx with tracheal aspiration of liquid before swallow. Pt exhibits prompt coughing after aspiration, and coughing appears to clear aspirated liquid. No other instances of premature bolus loss noted across remainder of trials.       Pharyngeal Phase  Impaired  Major Contributing Deficits  · Delayed Swallow Initiation: mild  · Reduced Pharyngeal Peristalsis/Shortening  · Reduced Epiglottic Retroversion  · Reduced Anterior Hyolaryngeal Movement  · Reduced Tongue Base Retraction  Comment: Moderate pharyngeal phase dysphagia characterized by mild swallow initiation delay (5ml spoon sip of thin liquids reaches level between valleculae and pyriform sinuses prior to swallow onset), decreased pharyngeal contraction/shortening during swallow, impaired tongue base retraction and epilgottic retroversion during swallow, and decreased anterior hyolaryngeal movement during swallow. There is no complete obliteration of pharyngeal space at neight of swallow, and there is therefore, pharyngeal retention of all po trials to some degree post swallow. Residuals as follows: thin liquids - mild vallecular pooling post swallow; pureed food trials - moderate vallecular retention, mild posterior pharyngeal wall retention, mild-mod tongue base retention (decreases significantly with small sip of thin liquid to clear); barium-coated cracker trials -  moderate vallecular retention, mild posterior pharyngeal wall retention, mild-mod tongue base retention (decreases significantly with small sip of thin liquid to clear). Of note, pt took large sip of thin liquid following intial cracker trial in an effort to clear residuals, and there was premature loss of partial bolus to pharynx with tracheal aspiration of liquid before swallow. Pt exhibits prompt coughing after aspiration, and coughing appears to clear aspirated liquid. Esophageal Phase  Impaired  Major Contributing Deficits  · Reduced Cricopharyngeal Opening: mild  · Decreased Esophageal Peristalsis  suspected  Comment: Prominent cricopharyngeus noted with possible retention of po trials in esophagus. Recommend GI consult if assessment of esophageal motility is desired. Pain   Patient Currently in Pain: Denies         Therapy Time:   Individual Concurrent Group Co-treatment   Time In 0930         Time Out 1001         Minutes 31                 Kelsey Napoles, [id number]  Speech-Language Pathologist  Registered MBSImP Clinician     10/29/2020, 11:27 AM

## 2020-10-29 NOTE — COMMUNICATION BODY
INSTRUMENTAL SWALLOW REPORT  MODIFIED BARIUM SWALLOW    NAME: Yareli Matson   : 1940  MRN: [id number]       Date of Eval: 10/29/2020     Ordering Physician: Sandra Crespo NP  Radiologist: Dr. Leno Reese     Referring Diagnosis(es): Referring Diagnosis: dysphagia    Past Medical History:  has a past medical history of Anemia, unspecified, Asthma, Atrial fibrillation ([address] Baker's cyst, Basal cell carcinoma of left cheek, Blind left eye, Carotid artery stenoses, Carotid stenosis, Cervical spinal cord compression (HCC), 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-sided sciatica, CKD (chronic kidney disease) stage 3, GFR 30-59 ml/min ([address] Community acquired pneumonia of left lower lobe of lung ([address] CRI, Detached retina, left, Diverticulosis, DJD (degenerative joint disease), Edema, Fatty liver, GERD (gastroesophageal reflux disease), Herniated lumbar intervertebral disc, Hx stage 2 sacral decubitus ulcer, Hyperlipidemia, Hypertension, Hypothyroid, Morbid obesity with BMI of 40.0-44.9, adult ([address] BETSY treated with BiPAP, Parkinson's disease ([address] Partial symptomatic epilepsy with complex partial seizures, not intractable, without status epilepticus ([address] Restless leg syndrome, Restless legs syndrome, Rheumatic fever, Sleep apnea, Tremor, essential, Type 2 diabetes mellitus without complication ([address] and Type II or unspecified type diabetes mellitus without mention of complication, not stated as uncontrolled. Past Surgical History:  has a past surgical history that includes Tonsillectomy; Hysterectomy; Cholecystectomy; Tubal ligation; knee surgery; hernia repair; eye surgery; Cataract removal; vitrectomy; Corneal transplant; Carotid endarterectomy; Appendectomy; cervical fusion (2010); shoulder surgery; joint replacement (Bilateral, ); Colonoscopy (N/A, 3/8/2020); Upper gastrointestinal endoscopy (2020); and Upper gastrointestinal endoscopy (N/A, 3/17/2020).     Current Diet Solid Consistency: Regular  Current Diet Liquid Consistency: Thin       Type of Study: Initial MBS       Patient Complaints/Reason for Referral:  Josh Mclain was referred for a MBS to assess the efficiency of his/her swallow function, assess for aspiration, and to make recommendations regarding safe dietary consistencies, effective compensatory strategies, and safe eating environment. Patient complaints: 68year old female with Hx of PD, complaining of difficulty chewing and swallow coarse solids. Pt reports that solids occasional cause her to cough after swallow. No recent Hx pneumonia, per pt. General Comment  Comments: 68year old female with Hx of PD, complaining of difficulty chewing and swallow coarse solids. Pt reports that solids occasional cause her to cough after swallow. No recent Hx pneumonia, per pt. Behavior/Cognition/Vision/Hearing:  Behavior/Cognition: Alert; Cooperative;Pleasant mood  Vision: Impaired  Vision Exceptions: Cataracts  Hearing: Within functional limits    General:  Treatment Dx and ICD 10: dysphagia   Patient Position: Lateral and Patient Degrees: pt sitting upright in MBS chair throughout study  Consistencies Administered: Reg solid; Dysphagia Pureed (Dysphagia I); Thin cup; Thin teaspoon; Thin straw;Nectar cup  Compensatory Swallowing Strategies Attempted: Eat/Feed slowly;Upright as possible for all oral intake; No straws; Alternate solids and liquids; Remain upright for 30-45 minutes after meals;Small bites/sips       Impressions:  Mild-moderate oral phase dysphagia chracterized by lingual incoordination with pt exhibiting lingual rocking and repetitive incoordinated movements of tongue prior to final A-P transmission of bolus to posterior oral cavity for swallow onset. There is decreased bolus cohesion noted with pt losing thin liquids to floor of mouth during cued bolus hold. There is also mild sublingual pooling of thin liquids post swallow.  There was one instance of premature loss of thin liquid bolus to pharynx. This occurred when pt took large sip of thin liquid following intial cracker trial in an effort to clear residuals. There was premature loss of partial bolus to pharynx with tracheal aspiration of liquid before swallow. Pt exhibits prompt coughing after aspiration, and coughing appears to clear aspirated liquid. No other instances of premature bolus loss noted across remainder of trials. Moderate pharyngeal phase dysphagia characterized by mild swallow initiation delay (5ml spoon sip of thin liquids reaches level between valleculae and pyriform sinuses prior to swallow onset), decreased pharyngeal contraction/shortening during swallow, impaired tongue base retraction and epilgottic retroversion during swallow, and decreased anterior hyolaryngeal movement during swallow. There is no complete obliteration of pharyngeal space at neight of swallow, and there is therefore, pharyngeal retention of all po trials to some degree post swallow. Residuals as follows: thin liquids - mild vallecular pooling post swallow; pureed food trials - moderate vallecular retention, mild posterior pharyngeal wall retention, mild-mod tongue base retention (decreases significantly with small sip of thin liquid to clear); barium-coated cracker trials -  moderate vallecular retention, mild posterior pharyngeal wall retention, mild-mod tongue base retention (decreases significantly with small sip of thin liquid to clear). Of note, pt took large sip of thin liquid following intial cracker trial in an effort to clear residuals, and there was premature loss of partial bolus to pharynx with tracheal aspiration of liquid before swallow. Pt exhibits prompt coughing after aspiration, and coughing appears to clear aspirated liquid. Upper Esophageal Screen: Prominent cricopharyngeus noted with possible retention of po trials in esophagus.  Recommend GI consult if assessment of esophageal motility is desired. Dysphagia Outcome Severity Scale: Level 4: Mild moderate dysphagia- Intermittent supervision/cueing. One - two diet consistencies restricted  Penetration-Aspiration Scale (PAS): 6 - Material enters the airway, passes below the vocal folds, and is ejected into the larynx or out of the airway    Recommended Diet:  Solid consistency: Regular (Pt to choose softer, moister solids)  Liquid consistency: Thin  Liquid administration via: Cup    Medication administration: PO    Safe Swallow Protocol:  Supervision: 1:1  Compensatory Swallowing Strategies:   · Medications in puree  · No straws;  · Eat/Feed slowly;  · Alternate solids and liquids;  · Upright as possible for all oral intake;  · Remain upright for 30-45 minutes after meals;  · Small bites/sips      Recommendations/Treatment  Requires SLP Intervention: Yes(if pt and spouse wish to pursue outpatient dysphagia treatment, pt would be a good candidate for this)       Education: Images and recommendations were reviewed with pt, spouse following this exam.   Patient Education: Dysphagia Tx: Extensive education with pt and spouse regarding exact nature of pharyngeal phase deficits, aspiration precautions to follow, recommendation for consideration of outpatient dysphagia treatment, as well as consideration of GI consult in light of possible esophageal dysmotility  Patient Education Response: Verbalizes understanding;Needs reinforcement    Prognosis  Prognosis for safe diet advancement: good  Barriers to reach goals: cognitive deficits  Safety Devices  Safety Devices in place: Yes  Type of devices: All fall risk precautions in place; Left in chair(with spouse)        Oral Preparation / Oral Phase  Impaired  Major Contributing Deficits  · Weak Lingual Manipulation  · Lingual Rocking  · Reduced Bolus Control  · Decreased Bolus Cohesion  Comment: Mild-moderate oral phase dysphagia chracterized by lingual incoordination with pt exhibiting lingual rocking and wall retention, mild-mod tongue base retention (decreases significantly with small sip of thin liquid to clear). Of note, pt took large sip of thin liquid following intial cracker trial in an effort to clear residuals, and there was premature loss of partial bolus to pharynx with tracheal aspiration of liquid before swallow. Pt exhibits prompt coughing after aspiration, and coughing appears to clear aspirated liquid. Esophageal Phase  Impaired  Major Contributing Deficits  · Reduced Cricopharyngeal Opening: mild  · Decreased Esophageal Peristalsis  suspected  Comment: Prominent cricopharyngeus noted with possible retention of po trials in esophagus. Recommend GI consult if assessment of esophageal motility is desired. Pain   Patient Currently in Pain: Denies         Therapy Time:   Individual Concurrent Group Co-treatment   Time In 0930         Time Out 1001         Minutes 31                 Kelsey Napoles, [address] [facility] [address] [id number]  Speech-Language Pathologist  Registered MBSImP Clinician     10/29/2020, 11:27 AM

## 2020-10-29 NOTE — PROCEDURES
INSTRUMENTAL SWALLOW REPORT  MODIFIED BARIUM SWALLOW    NAME: Magalie Cunningham   : 1940  MRN: [id number]       Date of Eval: 10/29/2020     Ordering Physician: Oneil Booker NP  Radiologist: Dr. Tommy Edge     Referring Diagnosis(es): Referring Diagnosis: dysphagia    Past Medical History:  has a past medical history of Anemia, unspecified, Asthma, Atrial fibrillation ([address] Baker's cyst, Basal cell carcinoma of left cheek, Blind left eye, Carotid artery stenoses, Carotid stenosis, Cervical spinal cord compression (HCC), 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-sided sciatica, CKD (chronic kidney disease) stage 3, GFR 30-59 ml/min ([address] Community acquired pneumonia of left lower lobe of lung ([address] CRI, Detached retina, left, Diverticulosis, DJD (degenerative joint disease), Edema, Fatty liver, GERD (gastroesophageal reflux disease), Herniated lumbar intervertebral disc, Hx stage 2 sacral decubitus ulcer, Hyperlipidemia, Hypertension, Hypothyroid, Morbid obesity with BMI of 40.0-44.9, adult ([address] BETSY treated with BiPAP, Parkinson's disease ([address] Partial symptomatic epilepsy with complex partial seizures, not intractable, without status epilepticus ([address] Restless leg syndrome, Restless legs syndrome, Rheumatic fever, Sleep apnea, Tremor, essential, Type 2 diabetes mellitus without complication ([address] and Type II or unspecified type diabetes mellitus without mention of complication, not stated as uncontrolled. Past Surgical History:  has a past surgical history that includes Tonsillectomy; Hysterectomy; Cholecystectomy; Tubal ligation; knee surgery; hernia repair; eye surgery; Cataract removal; vitrectomy; Corneal transplant; Carotid endarterectomy; Appendectomy; cervical fusion (2010); shoulder surgery; joint replacement (Bilateral, ); Colonoscopy (N/A, 3/8/2020); Upper gastrointestinal endoscopy (2020); and Upper gastrointestinal endoscopy (N/A, 3/17/2020).     Current Diet Solid Consistency: Regular  Current Diet Liquid Consistency: Thin       Type of Study: Initial MBS       Patient Complaints/Reason for Referral:  Dominic Oconnell was referred for a MBS to assess the efficiency of his/her swallow function, assess for aspiration, and to make recommendations regarding safe dietary consistencies, effective compensatory strategies, and safe eating environment. Patient complaints: 68year old female with Hx of PD, complaining of difficulty chewing and swallow coarse solids. Pt reports that solids occasional cause her to cough after swallow. No recent Hx pneumonia, per pt. General Comment  Comments: 68year old female with Hx of PD, complaining of difficulty chewing and swallow coarse solids. Pt reports that solids occasional cause her to cough after swallow. No recent Hx pneumonia, per pt. Behavior/Cognition/Vision/Hearing:  Behavior/Cognition: Alert; Cooperative;Pleasant mood  Vision: Impaired  Vision Exceptions: Cataracts  Hearing: Within functional limits    General:  Treatment Dx and ICD 10: dysphagia   Patient Position: Lateral and Patient Degrees: pt sitting upright in MBS chair throughout study  Consistencies Administered: Reg solid; Dysphagia Pureed (Dysphagia I); Thin cup; Thin teaspoon; Thin straw;Nectar cup  Compensatory Swallowing Strategies Attempted: Eat/Feed slowly;Upright as possible for all oral intake; No straws; Alternate solids and liquids; Remain upright for 30-45 minutes after meals;Small bites/sips       Impressions:  Mild-moderate oral phase dysphagia chracterized by lingual incoordination with pt exhibiting lingual rocking and repetitive incoordinated movements of tongue prior to final A-P transmission of bolus to posterior oral cavity for swallow onset. There is decreased bolus cohesion noted with pt losing thin liquids to floor of mouth during cued bolus hold. There is also mild sublingual pooling of thin liquids post swallow.  There was one instance of premature desired. Dysphagia Outcome Severity Scale: Level 4: Mild moderate dysphagia- Intermittent supervision/cueing. One - two diet consistencies restricted  Penetration-Aspiration Scale (PAS): 6 - Material enters the airway, passes below the vocal folds, and is ejected into the larynx or out of the airway    Recommended Diet:  Solid consistency: Regular (Pt to choose softer, moister solids)  Liquid consistency: Thin  Liquid administration via: Cup    Medication administration: PO    Safe Swallow Protocol:  Supervision: 1:1  Compensatory Swallowing Strategies:   · Medications in puree  · No straws;  · Eat/Feed slowly;  · Alternate solids and liquids;  · Upright as possible for all oral intake;  · Remain upright for 30-45 minutes after meals;  · Small bites/sips      Recommendations/Treatment  Requires SLP Intervention: Yes(if pt and spouse wish to pursue outpatient dysphagia treatment, pt would be a good candidate for this)       Education: Images and recommendations were reviewed with pt, spouse following this exam.   Patient Education: Dysphagia Tx: Extensive education with pt and spouse regarding exact nature of pharyngeal phase deficits, aspiration precautions to follow, recommendation for consideration of outpatient dysphagia treatment, as well as consideration of GI consult in light of possible esophageal dysmotility  Patient Education Response: Verbalizes understanding;Needs reinforcement    Prognosis  Prognosis for safe diet advancement: good  Barriers to reach goals: cognitive deficits  Safety Devices  Safety Devices in place: Yes  Type of devices: All fall risk precautions in place; Left in chair(with spouse)        Oral Preparation / Oral Phase  Impaired  Major Contributing Deficits  · Weak Lingual Manipulation  · Lingual Rocking  · Reduced Bolus Control  · Decreased Bolus Cohesion  Comment: Mild-moderate oral phase dysphagia chracterized by lingual incoordination with pt exhibiting lingual rocking and wall retention, mild-mod tongue base retention (decreases significantly with small sip of thin liquid to clear). Of note, pt took large sip of thin liquid following intial cracker trial in an effort to clear residuals, and there was premature loss of partial bolus to pharynx with tracheal aspiration of liquid before swallow. Pt exhibits prompt coughing after aspiration, and coughing appears to clear aspirated liquid. Esophageal Phase  Impaired  Major Contributing Deficits  · Reduced Cricopharyngeal Opening: mild  · Decreased Esophageal Peristalsis  suspected  Comment: Prominent cricopharyngeus noted with possible retention of po trials in esophagus. Recommend GI consult if assessment of esophageal motility is desired. Pain   Patient Currently in Pain: Denies         Therapy Time:   Individual Concurrent Group Co-treatment   Time In 0930         Time Out 1001         Minutes 31                 Kelsey Sebastian, [address] [id number]  Speech-Language Pathologist  Registered MBSImP Clinician     10/29/2020, 11:27 AM

## 2020-10-29 NOTE — LETTER
2020       Patient: Brody Vasquez  MR Number: [id number]  YOB: 1940  Date of Visit: 10/29/2020    Dear Guille Wade CNP:    Thank you for the request for consultation for Venus Lennox to me for the evaluation of swallowing. Below are the relevant portions of my assessment and plan of care. If you have questions, please do not hesitate to call me. Sincerely,    Tonny Escalante.  [address]  Speech-Language Pathologist                                                        INSTRUMENTAL SWALLOW REPORT  MODIFIED BARIUM SWALLOW    NAME: Brody Vasquez   : 1940  MRN: [id number]       Date of Eval: 10/29/2020     Ordering Physician: Guille Wade NP  Radiologist: Dr. Yuniel Menard     Referring Diagnosis(es): Referring Diagnosis: dysphagia    Past Medical History:  has a past medical history of Anemia, unspecified, Asthma, Atrial fibrillation ([address] Baker's cyst, Basal cell carcinoma of left cheek, Blind left eye, Carotid artery stenoses, Carotid stenosis, Cervical spinal cord compression (HCC), 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-sided sciatica, CKD (chronic kidney disease) stage 3, GFR 30-59 ml/min ([address] Community acquired pneumonia of left lower lobe of lung ([address] CRI, Detached retina, left, Diverticulosis, DJD (degenerative joint disease), Edema, Fatty liver, GERD (gastroesophageal reflux disease), Herniated lumbar intervertebral disc, Hx stage 2 sacral decubitus ulcer, Hyperlipidemia, Hypertension, Hypothyroid, Morbid obesity with BMI of 40.0-44.9, adult ([address] BETSY treated with BiPAP, Parkinson's disease ([address] Partial symptomatic epilepsy with complex partial seizures, not intractable, without status epilepticus ([address] Restless leg syndrome, Restless legs syndrome, Rheumatic fever, Sleep apnea, Tremor, essential, Type 2 diabetes mellitus without complication ([address] and Type II or unspecified type diabetes mellitus without mention of complication, not stated as uncontrolled. Past Surgical History:  has a past surgical history that includes Tonsillectomy; Hysterectomy; Cholecystectomy; Tubal ligation; knee surgery; hernia repair; eye surgery; Cataract removal; vitrectomy; Corneal transplant; Carotid endarterectomy; Appendectomy; cervical fusion (11/2010); shoulder surgery; joint replacement (Bilateral, 2003); Colonoscopy (N/A, 3/8/2020); Upper gastrointestinal endoscopy (03/17/2020); and Upper gastrointestinal endoscopy (N/A, 3/17/2020). Current Diet Solid Consistency: Regular  Current Diet Liquid Consistency: Thin       Type of Study: Initial MBS       Patient Complaints/Reason for Referral:  Danie Barthel was referred for a MBS to assess the efficiency of his/her swallow function, assess for aspiration, and to make recommendations regarding safe dietary consistencies, effective compensatory strategies, and safe eating environment. Patient complaints: 68year old female with Hx of PD, complaining of difficulty chewing and swallow coarse solids. Pt reports that solids occasional cause her to cough after swallow. No recent Hx pneumonia, per pt. General Comment  Comments: 68year old female with Hx of PD, complaining of difficulty chewing and swallow coarse solids. Pt reports that solids occasional cause her to cough after swallow. No recent Hx pneumonia, per pt. Behavior/Cognition/Vision/Hearing:  Behavior/Cognition: Alert; Cooperative;Pleasant mood  Vision: Impaired  Vision Exceptions: Cataracts  Hearing: Within functional limits    General:  Treatment Dx and ICD 10: dysphagia   Patient Position: Lateral and Patient Degrees: pt sitting upright in MBS chair throughout study  Consistencies Administered: Reg solid; Dysphagia Pureed (Dysphagia I); Thin cup; Thin teaspoon; Thin straw;Nectar cup  Compensatory Swallowing Strategies Attempted: Eat/Feed slowly;Upright as possible for all oral intake; No straws; Alternate solids and liquids; Remain upright for 30-45 minutes after meals;Small bites/sips       Impressions:  Mild-moderate oral phase dysphagia chracterized by lingual incoordination with pt exhibiting lingual rocking and repetitive incoordinated movements of tongue prior to final A-P transmission of bolus to posterior oral cavity for swallow onset. There is decreased bolus cohesion noted with pt losing thin liquids to floor of mouth during cued bolus hold. There is also mild sublingual pooling of thin liquids post swallow. There was one instance of premature loss of thin liquid bolus to pharynx. This occurred when pt took large sip of thin liquid following intial cracker trial in an effort to clear residuals. There was premature loss of partial bolus to pharynx with tracheal aspiration of liquid before swallow. Pt exhibits prompt coughing after aspiration, and coughing appears to clear aspirated liquid. No other instances of premature bolus loss noted across remainder of trials. Moderate pharyngeal phase dysphagia characterized by mild swallow initiation delay (5ml spoon sip of thin liquids reaches level between valleculae and pyriform sinuses prior to swallow onset), decreased pharyngeal contraction/shortening during swallow, impaired tongue base retraction and epilgottic retroversion during swallow, and decreased anterior hyolaryngeal movement during swallow. There is no complete obliteration of pharyngeal space at neight of swallow, and there is therefore, pharyngeal retention of all po trials to some degree post swallow.  Residuals as follows: thin liquids - mild vallecular pooling post swallow; pureed food trials - moderate vallecular retention, mild posterior pharyngeal wall retention, mild-mod tongue base retention (decreases significantly with small sip of thin liquid to clear); barium-coated cracker trials -  moderate vallecular retention, mild posterior pharyngeal wall retention, mild-mod tongue base retention (decreases significantly with small sip of thin liquid to clear). Of note, pt took large sip of thin liquid following intial cracker trial in an effort to clear residuals, and there was premature loss of partial bolus to pharynx with tracheal aspiration of liquid before swallow. Pt exhibits prompt coughing after aspiration, and coughing appears to clear aspirated liquid. Upper Esophageal Screen: Prominent cricopharyngeus noted with possible retention of po trials in esophagus. Recommend GI consult if assessment of esophageal motility is desired. Dysphagia Outcome Severity Scale: Level 4: Mild moderate dysphagia- Intermittent supervision/cueing. One - two diet consistencies restricted  Penetration-Aspiration Scale (PAS): 6 - Material enters the airway, passes below the vocal folds, and is ejected into the larynx or out of the airway    Recommended Diet:  Solid consistency: Regular (Pt to choose softer, moister solids)  Liquid consistency:  Thin  Liquid administration via: Cup    Medication administration: PO    Safe Swallow Protocol:  Supervision: 1:1  Compensatory Swallowing Strategies:   · Medications in puree  · No straws;  · Eat/Feed slowly;  · Alternate solids and liquids;  · Upright as possible for all oral intake;  · Remain upright for 30-45 minutes after meals;  · Small bites/sips      Recommendations/Treatment  Requires SLP Intervention: Yes(if pt and spouse wish to pursue outpatient dysphagia treatment, pt would be a good candidate for this)       Education: Images and recommendations were reviewed with pt, spouse following this exam.   Patient Education: Dysphagia Tx: Extensive education with pt and spouse regarding exact nature of pharyngeal phase deficits, aspiration precautions to follow, recommendation for consideration of outpatient dysphagia treatment, as well as consideration of GI consult in light of retraction and epilgottic retroversion during swallow, and decreased anterior hyolaryngeal movement during swallow. There is no complete obliteration of pharyngeal space at neight of swallow, and there is therefore, pharyngeal retention of all po trials to some degree post swallow. Residuals as follows: thin liquids - mild vallecular pooling post swallow; pureed food trials - moderate vallecular retention, mild posterior pharyngeal wall retention, mild-mod tongue base retention (decreases significantly with small sip of thin liquid to clear); barium-coated cracker trials -  moderate vallecular retention, mild posterior pharyngeal wall retention, mild-mod tongue base retention (decreases significantly with small sip of thin liquid to clear). Of note, pt took large sip of thin liquid following intial cracker trial in an effort to clear residuals, and there was premature loss of partial bolus to pharynx with tracheal aspiration of liquid before swallow. Pt exhibits prompt coughing after aspiration, and coughing appears to clear aspirated liquid. Esophageal Phase  Impaired  Major Contributing Deficits  · Reduced Cricopharyngeal Opening: mild  · Decreased Esophageal Peristalsis  suspected  Comment: Prominent cricopharyngeus noted with possible retention of po trials in esophagus. Recommend GI consult if assessment of esophageal motility is desired. Pain   Patient Currently in Pain: Denies         Therapy Time:   Individual Concurrent Group Co-treatment   Time In 0930         Time Out 1001         Minutes 31                 Kelsey Napoles, [address] [id number]  Speech-Language Pathologist  Registered MBSImP Clinician     10/29/2020, 11:27 AM

## 2020-11-11 ENCOUNTER — TELEPHONE (OUTPATIENT)
Dept: PULMONOLOGY | Age: 80
End: 2020-11-11

## 2020-11-11 NOTE — TELEPHONE ENCOUNTER

## 2020-11-25 ENCOUNTER — VIRTUAL VISIT (OUTPATIENT)
Dept: PULMONOLOGY | Age: 80
End: 2020-11-25
Payer: MEDICARE

## 2020-11-25 ENCOUNTER — TELEPHONE (OUTPATIENT)
Dept: PULMONOLOGY | Age: 80
End: 2020-11-25

## 2020-11-25 PROCEDURE — 99212 OFFICE O/P EST SF 10 MIN: CPT | Performed by: NURSE PRACTITIONER

## 2020-11-25 ASSESSMENT — SLEEP AND FATIGUE QUESTIONNAIRES
HOW LIKELY ARE YOU TO NOD OFF OR FALL ASLEEP WHILE LYING DOWN TO REST IN THE AFTERNOON WHEN CIRCUMSTANCES PERMIT: 1
HOW LIKELY ARE YOU TO NOD OFF OR FALL ASLEEP IN A CAR, WHILE STOPPED FOR A FEW MINUTES IN TRAFFIC: 0
HOW LIKELY ARE YOU TO NOD OFF OR FALL ASLEEP WHEN YOU ARE A PASSENGER IN A CAR FOR AN HOUR WITHOUT A BREAK: 0
ESS TOTAL SCORE: 1
HOW LIKELY ARE YOU TO NOD OFF OR FALL ASLEEP WHILE WATCHING TV: 0
HOW LIKELY ARE YOU TO NOD OFF OR FALL ASLEEP WHILE SITTING INACTIVE IN A PUBLIC PLACE: 0
HOW LIKELY ARE YOU TO NOD OFF OR FALL ASLEEP WHILE SITTING QUIETLY AFTER LUNCH WITHOUT ALCOHOL: 0
HOW LIKELY ARE YOU TO NOD OFF OR FALL ASLEEP WHILE SITTING AND READING: 0
HOW LIKELY ARE YOU TO NOD OFF OR FALL ASLEEP WHILE SITTING AND TALKING TO SOMEONE: 0

## 2020-11-25 NOTE — PROGRESS NOTES
Patient ID: Nathan Rudd is a 68 y.o. female who is being seen today for   Chief Complaint   Patient presents with    Sleep Apnea     1 year sleep      Nathan Rudd is a 68 y.o. female for telephone only virtual visit for BETSY follow up. Patient is using BiPAP 8-10 hrs/night. Using humidifier. No snoring on BiPAP. The pressure is well tolerated. The mask is comfortable-full face. +mask leak. No significant daytime sleepiness. No driving. No dry nose or throat. No fatigue. Bedtime is 10-11 pm and rise time is 7-8 am. Sleep onset is usually few-10 minutes. Wakes up 1 times at night total. 1 nocturia. It takes few minutes to fall back a sleep. Sometimes naps during the day. No headache in am. No weight gain. 0-1 caffienated beverages during the day. No alcohol. ESS is 1.     Sleep Medicine 11/25/2020 12/12/2019 11/14/2018 11/9/2017 8/9/2017 3/14/2017   Sitting and reading 0 1 0 0 0 3   Watching TV 0 1 0 1 0 3   Sitting, inactive in a public place (e.g. a theatre or a meeting) 0 1 0 0 0 1   As a passenger in a car for an hour without a break 0 1 0 0 0 3   Lying down to rest in the afternoon when circumstances permit 1 1 0 0 0 1   Sitting and talking to someone 0 0 0 0 0 0   Sitting quietly after a lunch without alcohol 0 1 0 0 1 1   In a car, while stopped for a few minutes in traffic 0 0 0 0 0 0   Total score 1 6 0 1 1 12   Neck circumference - 16.5 17 17.25 17.5 17.5       Past Medical History:  Past Medical History:   Diagnosis Date    Anemia, unspecified 2010    neg bone marrow    Asthma     At[facility] fibrillation ([facility]     Baker's cyst     Basal cell carcinoma of left cheek 3/12/2019    Blind left eye     Carotid artery stenoses     Carotid stenosis 2002    left    Cervical spinal cord compression ([facility] 11/2010    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-sided sciatica 8/9/2016    CKD (chronic kidney disease) stage 3, GFR 30-59 ml/min     Community acquired pneumonia of left lower lobe of lung 9/8/2018    CRI     Detached retina, left     Diverticulosis     DJD (degenerative joint disease)     Edema     chronic    Fatty liver     GERD (gastroesophageal reflux disease)     Herniated lumbar intervertebral disc     Hx stage 2 sacral decubitus ulcer 4/13/2018    Hyperlipidemia     Hypertension     Hypothyroid 1/27/2015    Morbid obesity with BMI of 40.0-44.9, adult ([facility] 2/10/2017    BETSY treated with BiPAP     Parkinson's disease ([facility]     Partial symptomatic epilepsy with complex partial seizures, not intractable, without status epilepticus ([facility] 1/24/2020    Restless leg syndrome     Restless legs syndrome     Rheumatic fever 1950    2x IN [place]    Sleep apnea     Tremor, essential 8/16/2010    Type 2 diabetes mellitus without complication (HCC)     Type II or unspecified type diabetes mellitus without mention of complication, not stated as uncontrolled        Past Surgical History:        Procedure Laterality Date    APPENDECTOMY      CAROTID ENDARTERECTOMY      left    CATARACT REMOVAL      CERVICAL FUSION  11/2010    CHOLECYSTECTOMY      COLONOSCOPY N/A 3/8/2020    COLONOSCOPY FLEXIBLE W/ DECOMPRESSION performed by Sri Saeed MD at [address] [facility] [address] REPLACEMENT Bilateral 2003    KNEE SURGERY      replacement x 2    SHOULDER SURGERY      TONSILLECTOMY      TUBAL LIGATION      UPPER GASTROINTESTINAL ENDOSCOPY  03/17/2020    Esophagitis    UPPER GASTROINTESTINAL ENDOSCOPY N/A 3/17/2020    EGD performed by Manohar Zuleta MD at [address] [facility] VITRECTOMY         Allergies:  is allergic to levaquin [levofloxacin]; metoclopramide; phenazopyridine; pyridium [phenazopyridine hcl]; reglan [metoclopramide hcl]; and reglan [metoclopramide hcl]. Social History:    TOBACCO:   reports that she has never smoked.  She has never used smokeless tobacco.  ETOH: reports no history of alcohol use. Family History:       Problem Relation Age of Onset    Diabetes Mother     Heart Disease Mother     Diabetes Father     Heart Disease Father     Diabetes Sister        Current Medications:    Current Outpatient Medications:     DULoxetine (CYMBALTA) 60 MG extended release capsule, Take 1 capsule by mouth daily, Disp: 90 capsule, Rfl: 0    PULMICORT FLEXHALER 180 MCG/ACT AEPB inhaler, Inhale 2 puffs into the lungs 2 times daily, Disp: 3 each, Rfl: 3    metoprolol tartrate (LOPRESSOR) 50 MG tablet, Take 1 tablet by mouth 2 times daily, Disp: 180 tablet, Rfl: 1    montelukast (SINGULAIR) 10 MG tablet, Take 1 tablet by mouth daily. , Disp: 90 tablet, Rfl: 1    furosemide (LASIX) 40 MG tablet, 0.5 tablets daily, Disp: 60 tablet, Rfl: 3    SEREVENT DISKUS 50 MCG/DOSE diskus inhaler, Inhale 1 puff by mouth 2 times a day., Disp: 3 each, Rfl: 1    carbidopa-levodopa (SINEMET)  MG per tablet, Take 1 tablet by mouth 4 times daily, Disp: , Rfl:     Alpha-Lipoic Acid 300 MG TABS, Take by mouth daily, Disp: , Rfl:     lamoTRIgine (LAMICTAL) 25 MG tablet, Take 25 mg by mouth 2 times daily 2 tabs BID, Disp: , Rfl:     fluticasone (FLONASE) 50 MCG/ACT nasal spray, USE 2 PUFFS IN EACH NOSTRIL DAILY, Disp: 1 Bottle, Rfl: 10    atorvastatin (LIPITOR) 40 MG tablet, Take 1 tablet by mouth daily Take 1 and 1/2 tablets by mouth every evening, Disp: , Rfl:     hydrALAZINE (APRESOLINE) 25 MG tablet, Take 1 tablet by mouth 2 times daily, Disp: 180 tablet, Rfl: 3    apixaban (ELIQUIS) 2.5 MG TABS tablet, Take 1 tablet by mouth 2 times daily, Disp: 180 tablet, Rfl: 3    propafenone (RYTHMOL) 150 MG tablet, Take 1 tablet by mouth every 8 hours, Disp: 270 tablet, Rfl: 3    NIFEdipine (ADALAT CC) 60 MG extended release tablet, Take 1 tablet by mouth daily, Disp: 90 tablet, Rfl: 3    Handicap Placard MISC, by Does not apply route DX: G20  parkinson's disease Exp: 9/1/2024, Disp: 1 each, Rfl: 0    ipratropium-albuterol (DUONEB) 0.5-2.5 (3) MG/3ML SOLN nebulizer solution, Inhale 3 mLs into the lungs every 6 hours as needed for Shortness of Breath DX: J45.40, Disp: 360 mL, Rfl: 1    albuterol sulfate HFA (VENTOLIN HFA) 108 (90 Base) MCG/ACT inhaler, Inhale 2 puffs into the lungs every 6 hours as needed for Wheezing, Disp: 3 Inhaler, Rfl: 1    Multiple Vitamins-Minerals (THERAPEUTIC MULTIVITAMIN-MINERALS) tablet, Take 1 tablet by mouth daily, Disp: , Rfl:     aspirin EC 81 MG EC tablet, Take 1 tablet by mouth daily. Start taking next week. Indications: OTC, Disp: 30 tablet, Rfl: 2    lidocaine (XYLOCAINE) 5 % ointment, Apply 1 Applicatorful topically as needed for Pain Apply topically as needed. , Disp: , Rfl:       Objective:   PHYSICAL EXAM:    There were no vitals taken for this visit. DATA:   5/6/17 Split night PSG AHI 59.8, low SpO2 52%, PLMS 86.9, Improved sleep related breathing with BiPAP, recommendations BIPAP 22/15 with 1 LO2 bleed in    BIPAP compliance data:  Compliance download report from 10/10/17 to 11/8/17  showed patient is using machine 8:49 hrs/night with 100% compliance and AHI 1.0 within this time frame. 30/30days with greater than 4 hours of machine use. BIPAP 22/15 cm H20    Compliance download report from 10/15/18 to 11/13/18 showed patient is using machine 7:58 hrs/night with 100% compliance and AHI 3.7 within this time frame. 30/30days with greater than 4 hours of machine use. 90% pressure BIPAP 22/15 cm H20    Compliance download report from 11/12/19 to 12/11/19  showed patient is using machine 9:34 hrs/night with 97% compliance and AHI 3.0 within this time frame. 29/30days with greater than 4 hours of machine use. BIPAP 22/15 cm H20    Compliance download report from 10/20/20 to 11/18/20 reviewed today by me and showed patient is using machine 9:22 hrs/night with 100% compliance and AHI 8.7 within this time frame.   30/30days with greater than 4 hours of machine use. 95th % leak 119.5 L/min. BIPAP 22/15 cm H20     Assessment:      · Severe BETSY- BIPAP 22/15 cm H2O with 1 LO2 bleed in. Optimal compliance on review today, residual AHI 8.7- large leak likely contributing. · Obesity  · Snoring, hypersomnia- resolved with BIPAP    Plan:       - Continue BIPAP 22/15 cm H2O with 1 LO2 bleed in  -Heated tubing  -Disucssed recommended cleaning and replacement of BiPAP supplies  -Mask fitting at [facility] with different full face mask for comfort/fit when able. - Advised to use BiPAP 6-8 hrs at night and during naps. - Replacement of mask, tubing, head straps every 3-6 months or sooner if damaged. - Patient instructed to contact [facility] company for any mask, tubing or machine trouble shooting if problems arise.  - Sleep hygiene  - Avoid sedatives, alcohol and caffeinated drinks at bed time. - Patient counseled to never drive or operate heavy machinery while fatigue, drowsy or sleepy. - Weight loss is recommended as a long-term intervention.    - Complications of BETSY if not treated were discussed with patient patient, including: systemic hypertension, pulmonary hypertension, cardiovascular morbidities, car accidents and all cause mortality.  -Patient education provided regarding sleep tips and PAP cleaning recommendations     Follow up: 1 year, sooner if needed    Consent for telehealth visit was obtained and is noted in chart    Katerin Zayas is a 68 y.o. female evaluated via telephone on 11/25/2020. I affirm this is a Patient Initiated Episode with a Patient who has not had a related appointment within my department in the past 7 days or scheduled within the next 24 hours.     Patient identification was verified at the start of the visit: Yes    Total Time: minutes: 5-10 minutes    Note: not billable if this call serves to triage the patient into an appointment for the relevant concern      Angela Jones

## 2020-12-09 ENCOUNTER — HOSPITAL ENCOUNTER (OUTPATIENT)
Dept: SPEECH THERAPY | Age: 80
Setting detail: THERAPIES SERIES
Discharge: HOME | End: 2020-12-09
Payer: MEDICARE

## 2020-12-09 PROCEDURE — 92610 EVALUATE SWALLOWING FUNCTION: CPT

## 2020-12-09 PROCEDURE — 92526 ORAL FUNCTION THERAPY: CPT

## 2020-12-09 NOTE — PROGRESS NOTES
[facility], [facility], [address] [facility], [address]   Phone: [phone]  Fax [phone]       Speech Language Pathology  Facility/Department: [facility]   CLINICAL BEDSIDE SWALLOW EVALUATION    NAME: Danie Barthel  : 1940  MRN: [id number]    ADMISSION DATE: 2020    Visit Diagnoses       Codes    Oropharyngeal dysphagia    -  Primary R13.12    Idiopathic Parkinson's disease ([facility]     G20          Onset Date: 18    Past Medical History:  has a past medical history of Anemia, unspecified, Asthma, Atrial fibrillation ([facility] Baker's cyst, Basal cell carcinoma of left cheek, Blind left eye, Carotid artery stenoses, Carotid stenosis, Cervical spinal cord compression (HCC), 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-sided sciatica, CKD (chronic kidney disease) stage 3, GFR 30-59 ml/min, Community acquired pneumonia of left lower lobe of lung, CRI, Detached retina, left, Diverticulosis, DJD (degenerative joint disease), Edema, Fatty liver, GERD (gastroesophageal reflux disease), Herniated lumbar intervertebral disc, Hx stage 2 sacral decubitus ulcer, Hyperlipidemia, Hypertension, Hypothyroid, Morbid obesity with BMI of 40.0-44.9, adult ([facility] BETSY treated with BiPAP, Parkinson's disease ([facility] Partial symptomatic epilepsy with complex partial seizures, not intractable, without status epilepticus ([facility] Restless leg syndrome, Restless legs syndrome, Rheumatic fever, Sleep apnea, Tremor, essential, Type 2 diabetes mellitus without complication ([facility] and Type II or unspecified type diabetes mellitus without mention of complication, not stated as uncontrolled. Past Surgical History:  has a past surgical history that includes Tonsillectomy; Hysterectomy; Cholecystectomy; Tubal ligation; knee surgery; hernia repair; eye surgery; Cataract removal; vitrectomy; Corneal transplant; Carotid endarterectomy;  Appendectomy; cervical fusion (11/2010); shoulder surgery; joint replacement (Bilateral, 2003); Colonoscopy (N/A, 3/8/2020); Upper gastrointestinal endoscopy (03/17/2020); and Upper gastrointestinal endoscopy (N/A, 3/17/2020). Plan of Care Submitted: (y/n): Faxed    Date of Eval: 12/9/2020  Evaluating Therapist: Kandace Joyce    Current Diet level:  Current Diet : Regular(pt to choose softer, moister solids)  Current Liquid Diet : Thin    Primary Complaint:   Patient Complaint: Frequent dysphagia including choking and coughing. The pt said that this occurs with both foods and liqudis but tends to occur more with tough, chewy and stringy solids. Pain: Denied being in pain    Reason for Referral  Lionel Ramos was referred for a bedside swallow evaluation to assess the efficiency of her swallow function, identify signs and symptoms of aspiration, and make recommendations regarding safe dietary consistencies, effective compensatory strategies, and safe eating environment. Impression  Dysphagia Diagnosis  Dysphagia Diagnosis: Moderate pharyngeal stage dysphagia; Moderate oral stage dysphagia  Dysphagia Outcome Severity Scale: Level 3: Moderate dysphagia- Total assisstance, supervision or strategies. Two or more diet consistencies restricted   The pt was seen for a clinical bedside swallowing evaluation and dysphagia treatment. The pt has reported recent coughing and choking on solids and liquids. See below for further details about her symptoms. She had an MBS on 10/29/20 that revealed significant dysphagia including an instance of aspiration with thins (see further details below). Today the pt presented with moderate oropharyngeal dysphagia. The oral phase is marked by reduced mastication, reduced bolus control and coordination, reduced A-P bolus propulsion and lingual pumping. The pharyngeal phase is characterized by delayed swallow onset,  reduced laryngeal elevation and suspected reduced pharyngeal clearing.  She occur more with tough, stringy and chewy solids. Behavior/Cognition  Behavior/Cognition: Alert; Cooperative;Pleasant mood  Respiratory Status: Room air  Communication Observation: Functional  Follows Directions: Simple  Dentition: Some missing teeth  Patient Positioning: Upright in chair  Baseline Vocal Quality: Dysphonic;Hoarse(Mildly)  Prior Dysphagia History: Yes; The pt had an MBS on 10/29/20 that revealed mild-moderate oral and moderate pharyngeal dysphagia. There was reported lingual incoordination, reduced A-P bolus propulsion, premature pharyngeal entry, reduced pharngeal contraction, reduced laryngeal movement and reduced tongue base retraction. There was x 1 instance of tracheal aspiration that was followed by prompt coughing that appeared to clear the aspirated liquid  Consistencies Administered: Reg solid; Dysphagia Pureed (Dysphagia I); Dysphagia Soft and Bite-Sized (Dysphagia III); Thin - cup; Thin - teaspoon    Vision/Hearing  Vision  Vision: Impaired  Vision Exceptions: Cataracts  Hearing  Hearing: Within functional limits    Oral Motor Deficits  Oral/Motor  Oral Motor: Exceptions to [facility]  Labial ROM: Reduced left; Reduced right(Mildly reduced with noted tremor like movement.)  Lingual Coordination: Reduced(Partial lingual tremor; Reduced lingual agility of movement)    Oral Phase Dysfunction  Oral Phase  Oral Phase: Exceptions  Oral Phase Dysfunction  Impaired Mastication: Reg Solid; Soft Solid  Decreased Anterior to Posterior Transit: All  Suspected Premature Bolus Loss: Thin  Oral Phase  Oral Phase - Comment: Noted significant lingual pumping with attempted A-P bolus propulsion     Indicators of Pharyngeal Phase Dysfunction   Pharyngeal Phase  Pharyngeal Phase: Exceptions  Indicators of Pharyngeal Phase Dysfunction  Delayed Swallow:  All  Decreased Laryngeal Elevation: All  Throat Clearing - Delayed: Reg Solid(x 1 instance)  Pharyngeal Phase   Pharyngeal: Multiple swallows needed per bite/sip    Prognosis  Prognosis  Prognosis for safe diet advancement: good  Barriers to reach goals: severity of dysphagia;age  Individuals consulted  Consulted and agree with results and recommendations: Patient; Family member  Family member consulted: spouse    Education  Patient Education: Dysphagia treatment: Results, recommendations, swallowing precautions and swallowing exercises. The following exercsies were trained: Catia Loupe jaw forward, lowering jaw against resistance, pressing tongue to roof of mouth, falsetto ee and Mendelsohn. Will target bolus control exercises later. Patient Education Response: Demonstrated understanding     Latex Allergy: [x]NO  []YES    Preferred Language for Healthcare: [x]English []Other:    [x]Patient history, allergies, meds reviewed. Medical chart reviewed. See intake form. Review of Symptoms (ROS):  [x]Performed Review of Symptoms (Integumentary, Cardiopulmonary, Neurological) by intake and observation. Intake form has been scanned into medical record. Patient has been instructed to contact their primary care physician regarding ROS issues if not already being addressed at this time.       C-SSRS Triggered by Intake questionnaire (Past 2 wk assessment):   [x] No, Questionnaire did not trigger screening.   [] Yes, Patient intake triggered further evaluation      [] C-SSRS Screening completed  [] PCP notified via Plan of Care  [] Emergency services notified       Therapy Time  SLP Individual Minutes  Time In: 0800  Time Out: 4225  Minutes: [address], [address]  Speech-Language Pathologist  Phone #: [phone]  Fax #: [phone]    12/9/2020 4:12 PM

## 2020-12-09 NOTE — PROGRESS NOTES
[facility] - Swallowing      Patient: Donna Capellan  : 1940  MRN: [id number]  Date: 2020  Electronically Signed by: Alma Xie MA, CCC-SLP       Note: In Levels 3-5, some patients may meet only one of the \"and/or\" criteria listed. If you have difficulty deciding on the most appropriate level for an individual, use dietary level as the most important criterion if the dietary level is the result of swallow function rather than dentition only. Dietary levels at [facility] 6 and 7 should be judged only on swallow function, and any influence of poor dentition should be disregarded. []  Level 1 Individual is not able to swallow anything safely by mouth. All nutrition and hydration is received through non-oral means (e.g., nasogastric tube, PEG)    []  Level 2 Individual is not able to swallow safely by mouth for nutrition and hydration, but may take some consistency with consistent maximal cues in therapy only. Alternative method of feeding required    []  Level 3  Alternative method of feeding required as individual takes less than 50% of nutrition and hydration by mouth, and/or swallowing is safe with consistent use of moderate cues to use compensatory strategies and/or requires maximum diet restriction    []  Level 4 Swallowing is safe, but usually requires moderate cues to use compensatory strategies, and/or the individual has moderate diet restrictions and /or still requires tube feeding and/or oral supplements    [x]  Level 5 Swallowing is safe with minimal diet restriction and/or occasionally requires minimal cueing to use compensatory strategies. The individual may occasionally self-cue. All nutrition and hydration needs are met by mouth at mealtime    []  Level 6 Swallowing is safe, and the individual eats and drinks independently and may rarely require minimal cueing. The individual usually self-cues when difficulty occurs.   May need to avoid specific food items (e.g., popcorn and nuts), or require additional time (due to dysphasia)    []  Level 7 The individual's ability to eat independently is not limited by swallow function. Swallowing would be safe and efficient for all consistencies. Compensatory strategies are effectively used when needed       Diet levels/restrictions are defined on next page. Please use levels as a guide in scoring this FCM                                        Swallowing: Dietary Levels/Restrictions  · Maximum Restrictions: Diet is two or more levels below a regular diet status and liquid consistency  · Moderate Restrictions: Diet is two or more levels below a regular diet status in either solid or liquid consistency (but not both), OR diet is one level below in both solid and liquid consistency   · Minimum Restrictions: Diet is one level below a regular diet status in solid or liquid consistency    Solids  · Regular: No restrictions  · Reduced One Level: Meats are cooked until soft, with not tough or stringy foods. Might include meats like meat loaf, baked fish, and soft chicken. Vegetables are cooked soft  · Reduced Two Levels: Meats are chopped or ground. Vegetables are one consistency (e.g., souffle, baked potato) or are mashed with a fork  · Reduced Three Levels: Meats and vegetables are pureed    Liquids  · Regular: Thin liquids;  No restrictions  · Reduced One Level: Nectar, syrup; mildly thick    · Reduced Two Levels: Honey; moderately thick  · Reduced Three Levels: Pudding; extra thick

## 2020-12-09 NOTE — PROGRESS NOTES
[facility]. and Therapy, [facility], [address] [facility], [address] [place] [address]  Phone: [phone]  Fax [phone]      Outpatient Speech Therapy  Phone: [phone] Fax: [phone]     To: Bonnie Mcneil NP      Patient: Katerin Zayas  : 1940  MRN: [id number]  Evaluation Date: 2020      Diagnosis Information:        Visit Diagnoses        Codes     Oropharyngeal dysphagia    -  Primary R13.12     Idiopathic Parkinson's         Speech Therapy Certification Form    Plan of Care/Treatment to date:  [] Speech-Language Evaluation/Treatment    [x] Dysphagia Evaluation/Treatment        [x] Dysphagia Treatment via Neuromuscular Electrical Stimulation (NMES)   [x] Repeat Modified Barium Swallowing Study if indicated   [] Fiberoptic Endoscopic Evaluation of Swallowing (FEES)  [] Cognitive-Linguistic Skills Development  [] Voice evaluation and Treatment      [] Evaluation, modification, and Training of Voice Prosthetic     [] Evaluation for Speech-Generating Augmentative and Alternative Communication Device   [] Therapeutic Services for the use of Speech-Generating Device. [] Other:          Frequency/Duration:  # Days per week: [] 1 day # Weeks: [] 1 week [] 5 weeks      [x] 2 days   [] 2 weeks [x] 6 weeks     [] 3 days   [] 3 weeks [] 7 weeks     [] 4 days   [] 4 weeks [] 8 weeks    Rehab Potential: [] Excellent [x] Good [] Fair  [] Poor       Electronically signed by: JAZZ Craft  Phone: [phone]  Fax: [phone]    If you have any questions or concerns, please don't hesitate to call.   Thank you for your referral.      Physician Signature:________________________________Date:__________________  By signing above, therapists plan is approved by physician

## 2020-12-10 RX ORDER — SALMETEROL XINAFOATE 50 MCG
BLISTER, WITH INHALATION DEVICE INHALATION
Qty: 1 EACH | Refills: 0 | Status: SHIPPED | OUTPATIENT
Start: 2020-12-10 | End: 2020-12-31 | Stop reason: SDUPTHER

## 2020-12-10 RX ORDER — HYDRALAZINE HYDROCHLORIDE 25 MG/1
TABLET, FILM COATED ORAL
Qty: 180 TABLET | Refills: 0 | Status: SHIPPED | OUTPATIENT
Start: 2020-12-10 | End: 2021-01-08 | Stop reason: SDUPTHER

## 2020-12-10 NOTE — TELEPHONE ENCOUNTER
Refill Request     Last Seen: 8/21/2020    Last Written: 6/18/2020    Next Appointment:   Future Appointments   Date Time Provider Department Center   12/14/2020  8:00 AM JAZZ Torres [facility] [place] HOD   12/16/2020  9:30 AM MHC CT MAIN MHCZ CT [facility]   12/16/2020 10:30 AM Liliana Newell MD CLERM PULM MMA   12/17/2020  8:00 AM JAZZ Torres [facility] SPEECH [place] HOD   12/21/2020  8:00 AM JAZZ Torres [facility] [place] [facility]   12/22/2020  8:00 AM JAZZ Torres [facility] [place] [facility]   12/29/2020  8:00 AM JAZZ Torres [facility] [place] HOD   12/31/2020  8:00 AM JAZZ Torres [facility] [place] HOD   1/4/2021  8:00 AM JAZZ Torres [facility] [place] HOD   1/7/2021  8:00 AM JAZZ Torres [facility] [place] HOD   2/26/2021  9:00 AM MD BIGG Prado [facility]   11/30/2021  9:00 AM MAHAD Chavira CNP [facility]             Requested Prescriptions     Pending Prescriptions Disp Refills    SEREVENT DISKUS 50 MCG/DOSE diskus inhaler [Pharmacy Med Name: Serevent Diskus 50mcg/actuation Powder for Inhalation] 1 each 0     Sig: Inhale 1 puff by mouth twice daily.

## 2020-12-14 ENCOUNTER — HOSPITAL ENCOUNTER (OUTPATIENT)
Dept: SPEECH THERAPY | Age: 80
Setting detail: THERAPIES SERIES
Discharge: HOME | End: 2020-12-14
Payer: MEDICARE

## 2020-12-14 PROCEDURE — 92526 ORAL FUNCTION THERAPY: CPT

## 2020-12-14 NOTE — PROGRESS NOTES
Speech Language Pathology     [place] [address]. and Therapy, [address], [address] [place] [address]   Phone: [phone]  Fax [phone]      Speech-Language Pathology  Daily Treatment Note    Date:  2020    Patient Name:  Donna Capellan    :  1940  MRN: [id number]  Restrictions/Precautions:  Dysphagia, aspiration  Diagnosis:    Visit Diagnoses        Codes     Oropharyngeal dysphagia    -  Primary R13.12     [facility]     G20     Treatment Diagnosis:  Moderate oropharyngeal dysphagia  Insurance/Certification information:  Primary: Medicare, Secondary: [facility] Tay Carrera   Referring Physician:  Minerva White NP  Plan of care signed (Y/N):  Y  Visit# / total visits: 2  Medicare Cap (if applicable):   Pain level: 0/10     Progress Note: []  Yes  [x]  No  Next due by: Visit #2/10 or 21    Subjective:      Objective:   VitalStim (NMES) placed in 3-h placement for 20 minutes. Pt reported intermittent jaw pain with this placement, particularly on the right side. She endorsed this has been an \"ongoing issue\" for her and that she notices it when chewing tough foods. Short-term Goals  Goal 1: The pt will tolerate regular solids without s/s of aspiration or other form of significant dysphagia 10/10  - Hilario cracker: ; only 1 bite trialed d/t pt reported jaw pain with mastication. Regular solid trials discontinued at this time. Goal 2: The pt will tolerate thin liquids without s/s of aspiration or other form of significant dysphagia 10/10  - (100%)    Long-term Goals  Goal 1: The pt will tolerate all PO consistencies without dysphagia on a consistent basis  -ongoing; pt largely denied dysphagia with meals / meds over the weekend. She endorsed occasional jaw pain with mastication of tough foods. SLP again reiterated avoiding dry, tough foods. She verbalized understanding.     Other Treatments: Several laryngeal / pharyngeal strengthening and TBR exercises reviewed and trained this date (completed while VitalStim in place)  -Shiela: x10  -Tongue tip to roof of mouth, hold, and swallow: x10  -Mendelsohn: x5; mod cues  -Effortful swallow: x10  -Tongue tip to roof of mouth, hold with pressure: x5  -Falsetto 'ee': x5  -Jaw jut forward: x3; discontinued d/t pt's previously reported jaw pain    -Several bolus control exercises also reviewed this date (x4 only; side to side; circular movement on R and L; forward and backward along tongue); x5 each    Assessment: The pt continues to tolerate safest and least restrictive diet largely without difficulty. She benefited from occasional cues to complete reviewed swallow exercises, see above.       Plan: ST to continue POC for 2x/week for 6 weeks    Total Treatment Time / Charges     Time in Time out Total Time / units   Cognitive Tx      Speech Tx      Dysphagia Tx 0800 0847 47 min / 1 unit       Signature:    Royer Stoll M.S. [address] [facility]  Speech-language pathologist  [id number]

## 2020-12-16 ENCOUNTER — VIRTUAL VISIT (OUTPATIENT)
Dept: PULMONOLOGY | Age: 80
End: 2020-12-16
Payer: MEDICARE

## 2020-12-16 ENCOUNTER — HOSPITAL ENCOUNTER (OUTPATIENT)
Dept: CT IMAGING | Age: 80
Discharge: HOME | End: 2020-12-16
Payer: MEDICARE

## 2020-12-16 ENCOUNTER — TELEPHONE (OUTPATIENT)
Dept: PULMONOLOGY | Age: 80
End: 2020-12-16

## 2020-12-16 PROCEDURE — 99442 PR PHYS/QHP TELEPHONE EVALUATION 11-20 MIN: CPT | Performed by: INTERNAL MEDICINE

## 2020-12-16 PROCEDURE — 71250 CT THORAX DX C-: CPT

## 2020-12-16 ASSESSMENT — SLEEP AND FATIGUE QUESTIONNAIRES
HOW LIKELY ARE YOU TO NOD OFF OR FALL ASLEEP WHILE LYING DOWN TO REST IN THE AFTERNOON WHEN CIRCUMSTANCES PERMIT: 1
HOW LIKELY ARE YOU TO NOD OFF OR FALL ASLEEP WHILE SITTING QUIETLY AFTER LUNCH WITHOUT ALCOHOL: 0
HOW LIKELY ARE YOU TO NOD OFF OR FALL ASLEEP WHILE SITTING INACTIVE IN A PUBLIC PLACE: 0
HOW LIKELY ARE YOU TO NOD OFF OR FALL ASLEEP WHILE WATCHING TV: 0
ESS TOTAL SCORE: 1
HOW LIKELY ARE YOU TO NOD OFF OR FALL ASLEEP IN A CAR, WHILE STOPPED FOR A FEW MINUTES IN TRAFFIC: 0
HOW LIKELY ARE YOU TO NOD OFF OR FALL ASLEEP WHILE SITTING AND READING: 0
HOW LIKELY ARE YOU TO NOD OFF OR FALL ASLEEP WHEN YOU ARE A PASSENGER IN A CAR FOR AN HOUR WITHOUT A BREAK: 0
HOW LIKELY ARE YOU TO NOD OFF OR FALL ASLEEP WHILE SITTING AND TALKING TO SOMEONE: 0

## 2020-12-16 NOTE — PROGRESS NOTES
Pulmonary Follow-up Note  Chief Complaint: abnormal CT CHEST  Referring Provider: hospital f/u      Interval history: doing well, no new concerns. Chest is essentially back to normal.    Presenting history: had fall resulting in rib fractures, CT performed in ED     reports that she has never smoked. She has never used smokeless tobacco.     Review of Systems:    Physical Exam:  not currently breastfeeding. PHONE     Data:   12/16/20 CT CHEST   Impression   1. Mild bibasilar atelectasis, without acute airspace consolidation. 2. While a majority of the patient's previously identified pulmonary nodules   have resolved and were likely infectious or inflammatory in etiology on the   study of 06/09/2020, there is a new 8 mm ground-glass nodule within the left   lower lobe.  While most likely benign, further follow-up of this nodule is as   advised below.  Namely, recommend chest CT follow-up in 6-12 months to   confirm persistence. Assessment:  · Pulmonary Nodules have largely resolved; unfortunately there is a new 8 mm ground glass Pulmonary Nodule   · Right lateral inferior chest wall pain 2/2 mechanical fall Feb 2020  · Parkinson Disease  · Asthma f/b PCP - on pulmicort and singulair   · PND  · Paroxysmal atrial fibrillation on Eliquis   · Diabetes mellitus     Plan:   · CT CHEST no IV dye in 12 months for ground glass Pulmonary Nodule   · Resume flonase     Marley Fuentes is a 68 y.o. female evaluated via telephone on 12/16/2020. Consent: She and/or health care decision maker is aware that that she may receive a bill for this telephone service, depending on her insurance coverage, and has provided verbal consent to proceed: YES. I communicated with the patient and/or health care decision maker about: see interval history above. Details of this discussion including any medical advice provided: see plan above. Total Time: minutes: 11-20 minutes.  I affirm this is a Patient Initiated Episode with a Patient who has not had a related appointment within my department in the past 7 days or scheduled within the next 24 hours. Patient identification was verified at the start of the visit: YES  Pursuant to the emergency declaration under the [address] [place] [address] authority and the [employer] Act, this Telephone Visit was conducted with patient's (and/or legal guardian's) consent, to reduce the patient's risk of exposure to COVID-19 and provide necessary medical care.   The patient (and/or legal guardian) his advised to contact this office for worsening conditions or problems, and seek emergency medical treatment and/or call 911 if urgent care needed,

## 2020-12-16 NOTE — Clinical Note
Ranulfo Rahman -- Pulmonary Nodules are better, but, of course, there is a new nodule seen. Low suspicion. F/U in one year.     Koby Cueva

## 2020-12-17 ENCOUNTER — HOSPITAL ENCOUNTER (OUTPATIENT)
Dept: SPEECH THERAPY | Age: 80
Setting detail: THERAPIES SERIES
Discharge: HOME | End: 2020-12-17
Payer: MEDICARE

## 2020-12-17 PROCEDURE — 92526 ORAL FUNCTION THERAPY: CPT

## 2020-12-17 NOTE — PROGRESS NOTES
Speech Language Pathology     [facility]. and Therapy, [facility],  Renetta Jhonatanjeffrey Ibarra, [address]  Phone: [phone]  Fax [phone]      Speech-Language Pathology  Daily Treatment Note    Date:  2020    Patient Name:  Oziel Nicholas    :  1940  MRN: [id number]  Restrictions/Precautions:  Dysphagia, aspiration  Diagnosis:    Visit Diagnoses        Codes     Oropharyngeal dysphagia    -  Primary R13.12     Idiopathic Parkinson's disease ([facility]     G20     Treatment Diagnosis:  Moderate oropharyngeal dysphagia  Insurance/Certification information:  Primary: Medicare, Secondary: [facility]   Referring Physician:  Flavio Romo NP  Plan of care signed (Y/N):  Y  Visit# / total visits: 3/13  Pain level: 0/10     Progress Note: []  Yes  [x]  No  Next due by: Visit #3/10 or 21    Subjective: The pt was pleasant and in good spirits. She was accompanied by her . Both reported that the pt is having reduced dysphagia symptoms overall. The pt's jaw pain was discussed. She said that she has been having chronic left side jaw pain for some time. She said bo the swallowing exercises have not increased this pain. However, as a result of this pain, she was told to stop the jaw jutting and jaw lowering swallowing exercises. Objective:   VitalStim (NMES) placed via 3a placement for 32 minutes to target overall improved laryngeal movement during the swallow and swallow initiation timing. Up to 8.0 mA of current was used. Electrodes were also placed bilaterally on her face over the parotid gland area due to continued report of oral dryness. Up to 4.0 mA of current was used in this area with improved saliva production reported. She tolerated NMES/VitalStim with no pain or reported discomfort.      Short-term Goals  Goal 1: The pt will tolerate regular solids without s/s of aspiration or other form of significant dysphagia 10/10 Maura Coates cracker: Tolerated 8 of 8 trials; Slow mastication but no s/s of aspiration. Goal 2: The pt will tolerate thin liquids without s/s of aspiration or other form of significant dysphagia 10/10  - Thin water via cup: 9 of 9 trials tolerated; No s/s of aspiration. Other Treatments:  Several laryngeal / pharyngeal strengthening and TBR exercises were completed this date (completed while VitalStim in place)  -Shiela: x10  -Tongue tip to roof of mouth, hold, and swallow: x10  -Mendelsohn: x5; mod cues  -Effortful swallow: x10  -Tongue tip to roof of mouth, hold with pressure: x 10  -Falsetto 'ee': x 10    - Bolus control exercises: 6/6 exercises completed, 10 x each, targeting lingual lateralization, retraction and protrusion. Assessment:   Overall improved swallowing function is noted with significantly less dysphagia symptoms reported by the pt and her . Plan: ST to continue POC for 2x/week for 6 weeks    Total Treatment Time / Charges     Time in Time out Total Time / units   Cognitive Tx      Speech Tx      Dysphagia Tx 0800 0845 45 min / 1 unit       Signature:     Rise Standing, [place], Sandi Curling  [facility][id number]  Speech-Language Pathologist  Phone: [phone]  Speech Desk: [phone]

## 2020-12-21 ENCOUNTER — HOSPITAL ENCOUNTER (OUTPATIENT)
Dept: SPEECH THERAPY | Age: 80
Setting detail: THERAPIES SERIES
Discharge: HOME | End: 2020-12-21
Payer: MEDICARE

## 2020-12-21 PROCEDURE — 92526 ORAL FUNCTION THERAPY: CPT

## 2020-12-21 NOTE — PROGRESS NOTES
- Chewy Granola bar with almonds and peanuts: Tolerated 8 of 8 trials; Slow mastication but no s/s of aspiration. Goal 2: The pt will tolerate thin liquids without s/s of aspiration or other form of significant dysphagia 10/10  - Thin water via cup: 10 of 10 trials tolerated; No s/s of aspiration. WILL D/C GOAL AS MET IF ABLE TO MAINTAIN TOLERANCE    Other Treatments:  Several laryngeal / pharyngeal strengthening and TBR exercises were completed this date (completed while VitalStim in place)  -Shiela: x 5 reps  -Tongue tip to roof of mouth, hold, and swallow: x 5 reps  -Mendelsohn: x 10 reps; mod cues  -Effortful swallow: x10  -Tongue tip to roof of mouth, hold with pressure: x 5 reps  -Falsetto 'ee': x 10 reps    - Bolus control exercises: Did not target this date. Assessment:   Continued significant improvement in oropharyngeal swallowing function. Plan: ST to continue POC for 2x/week for 6 weeks    Total Treatment Time / Charges     Time in Time out Total Time / units   Cognitive Tx      Speech Tx      Dysphagia Tx 0802 0845 43 min / 1 unit       Signature:     Natalie Babcock, [address] [facility]  [id number]  Speech-Language Pathologist  Phone: [phone]  Speech Desk: [phone]

## 2020-12-22 ENCOUNTER — HOSPITAL ENCOUNTER (OUTPATIENT)
Dept: SPEECH THERAPY | Age: 80
Setting detail: THERAPIES SERIES
Discharge: HOME | End: 2020-12-22
Payer: MEDICARE

## 2020-12-22 PROCEDURE — 92526 ORAL FUNCTION THERAPY: CPT

## 2020-12-22 NOTE — PROGRESS NOTES
- Diced pineapple: Tolerated 5 of 6 trials without s/s of aspiration occurring; x 1 instance of brief gagging sound with x 1 trial; This appeared to be due to the pt taking a large bite; the pt was encouraged to take smaller bites; Mastication remains slow and prolonged    Goal 2: The pt will tolerate thin liquids without s/s of aspiration or other form of significant dysphagia 10/10  - Thin water via cup: 8 of 8 trials tolerated; No s/s of aspiration. Other Treatments:  Several laryngeal / pharyngeal strengthening and TBR exercises were completed this date (completed while VitalStim in place)  -Shiela: x 7 reps  -Tongue tip to roof of mouth, hold, and swallow: x 5 reps  -Mendelsohn: x 10 reps; mod cues  -Effortful swallow: x10  -Tongue tip to roof of mouth, hold with pressure: x 10 reps  -Falsetto 'ee': x 10 reps    - Bolus control exercises: 6 of 6 exercises were completed; 10 reps each. Assessment:   Continued significant improvement in oropharyngeal swallowing function. Plan: ST to continue POC for 2x/week for 6 weeks    Total Treatment Time / Charges     Time in Time out Total Time / units   Cognitive Tx      Speech Tx      Dysphagia Tx 0801 0849 48 min / 1 unit       Signature:     [facility]it [facility], [address]  [id number]  Speech-Language Pathologist  Phone: [phone]  Speech Desk: [phone]

## 2020-12-29 ENCOUNTER — HOSPITAL ENCOUNTER (OUTPATIENT)
Dept: SPEECH THERAPY | Age: 80
Setting detail: THERAPIES SERIES
Discharge: HOME | End: 2020-12-29
Payer: MEDICARE

## 2020-12-29 PROCEDURE — 92526 ORAL FUNCTION THERAPY: CPT

## 2020-12-29 NOTE — FLOWSHEET NOTE
Speech Language Pathology     [facility] 79. and Therapy, [facility],  [place] Jhonatanjeffrey Ibarra, [address]  Phone: [phone]  Fax [phone]      Speech-Language Pathology  Daily Treatment Note    Date:  2020    Patient Name:  Deana Robles    :  1940  MRN: [id number]  Restrictions/Precautions:  Dysphagia, aspiration  Diagnosis:    Visit Diagnoses        Codes     Oropharyngeal dysphagia    -  Primary R13.12     Idiopathic Parkinson's disease ([facility] [address]     G20     Treatment Diagnosis:  Moderate oropharyngeal dysphagia  Insurance/Certification information:  Primary: Medicare, Secondary: [facility]   Referring Physician:  Fred Mckeon NP  Plan of care signed (Y/N):  Y  Visit# / total visits:   Pain level: 0/10     Progress Note: []  Yes  [x]  No  Next due by: Visit #6/10 or 21    Subjective: The pt and her  were in good spirits. The pt reported having a recent instance of dysphagia with roast beef that resulted in brief coughing/choking. She said that this instance of dysphagia was brief and that she was able to clear independently clear the solid from her pharynx. She attributed this instance of dysphagia to taking too large of a bite. She denied any other instances of dysphagia. Objective:   VitalStim (NMES) placed via 3a placement for 39 minutes to target overall improved laryngeal movement during the swallow and swallow initiation timing. Up to 8.0 mA of current was used. Electrodes were also placed bilaterally on her face over the parotid gland area due to continued report of oral dryness. Up to 5.0 mA of current was used in this area with improved saliva production reported. She tolerated NMES/VitalStim with no pain or reported discomfort.      Short-term Goals  Goal 1: The pt will tolerate regular solids without s/s of aspiration or other form of significant dysphagia 10/10

## 2020-12-31 ENCOUNTER — HOSPITAL ENCOUNTER (OUTPATIENT)
Dept: SPEECH THERAPY | Age: 80
Setting detail: THERAPIES SERIES
Discharge: HOME | End: 2020-12-31
Payer: MEDICARE

## 2020-12-31 PROCEDURE — 92526 ORAL FUNCTION THERAPY: CPT

## 2020-12-31 RX ORDER — HYDRALAZINE HYDROCHLORIDE 25 MG/1
TABLET, FILM COATED ORAL
Qty: 180 TABLET | Refills: 1 | OUTPATIENT
Start: 2020-12-31

## 2020-12-31 RX ORDER — MONTELUKAST SODIUM 10 MG/1
TABLET ORAL
Qty: 90 TABLET | Refills: 1 | Status: SHIPPED | OUTPATIENT
Start: 2020-12-31 | End: 2021-07-14

## 2020-12-31 RX ORDER — METOPROLOL TARTRATE 50 MG/1
50 TABLET, FILM COATED ORAL 2 TIMES DAILY
Qty: 180 TABLET | Refills: 1 | Status: SHIPPED | OUTPATIENT
Start: 2020-12-31 | End: 2021-07-14

## 2020-12-31 RX ORDER — DULOXETIN HYDROCHLORIDE 60 MG/1
60 CAPSULE, DELAYED RELEASE ORAL DAILY
Qty: 90 CAPSULE | Refills: 1 | Status: SHIPPED | OUTPATIENT
Start: 2020-12-31 | End: 2021-07-14

## 2020-12-31 RX ORDER — ALBUTEROL SULFATE 90 UG/1
AEROSOL, METERED RESPIRATORY (INHALATION)
Qty: 3 INHALER | Refills: 5 | Status: CANCELLED | OUTPATIENT
Start: 2020-12-31

## 2020-12-31 RX ORDER — FLUTICASONE PROPIONATE 50 MCG
SPRAY, SUSPENSION (ML) NASAL
Qty: 3 BOTTLE | Refills: 3 | Status: CANCELLED | OUTPATIENT
Start: 2020-12-31

## 2020-12-31 NOTE — FLOWSHEET NOTE
Speech Language Pathology     [facility] 79. and Therapy, [facility],  Renetta Jhonatanjeffrey Ibarra, [address]  Phone: [phone]  Fax [phone]      Speech-Language Pathology  Daily Treatment Note    Date:  2020    Patient Name:  Abbie Brown    :  1940  MRN: [id number]  Restrictions/Precautions:  Dysphagia, aspiration  Diagnosis:    Visit Diagnoses        Codes     Oropharyngeal dysphagia    -  Primary R13.12     Idiopathic Parkinson's disease ([facility] [address]     G20     Treatment Diagnosis:  Moderate oropharyngeal dysphagia  Insurance/Certification information:  Primary: Medicare, [facility]: [facility]   Referring Physician:  Leena Yoon NP  Plan of care signed (Y/N):  Y  Visit# / total visits:   Pain level: 0/10     Progress Note: []  Yes  [x]  No  Next due by: Visit #7/10 or 21    Subjective: The pt was accompanied by her  like usual. Both were in good spirits. The pt denied having any dysphagia symptoms since last session. Objective:   VitalStim (NMES) placed via 3a placement for 36 minutes to target overall improved laryngeal movement during the swallow and swallow initiation timing. Up to 9.0 mA of current was used. Electrodes were also placed bilaterally on her face over the parotid gland area due to continued report of oral dryness. Up to 5.5 mA of current was used in this area with improved saliva production reported. She tolerated NMES/VitalStim with no pain or reported discomfort. Short-term Goals  Goal 1: The pt will tolerate regular solids without s/s of aspiration or other form of significant dysphagia 10/10  - Bagel: Tolerated 1 of 1 trials without s/s of aspiration occurring; However, only x 1 trial was given due to extra time needed to masticate. She had to expel a piece of the bagel.      Goal 2: The pt will tolerate thin liquids without s/s of aspiration or other form of significant dysphagia 10/10 - Thin water via cup: 10 of 10 trials tolerated; No s/s of aspiration. WILL D/C GOAL AS MET IF ABLE TO MAINTAIN ACCURACY    Other Treatments:  Several laryngeal / pharyngeal strengthening and TBR exercises were completed this date (completed while VitalStim in place)  -Shiela: x 10 reps  -Tongue tip to roof of mouth, hold, and swallow: x 10 reps  -Mendelsohn: x 5 reps; mod cues  -Effortful swallow: x10  -Tongue tip to roof of mouth, hold with pressure: x 10 reps  -Falsetto 'ee': x 10 reps    - Bolus control exercises: Did not target today    Assessment:   The pt's mastication remains reduced. Otherwise, her overall oropharyngeal swallowing function continues to improve. Plan: ST to continue POC for 2x/week for 6 weeks    Total Treatment Time / Charges     Time in Time out Total Time / units   Cognitive Tx      Speech Tx      Dysphagia Tx 0759 0846 47 min / 1 unit       Signature:     Giorgio Strong, [address] [place] [address] Susan Egan  [id number]  Speech-Language Pathologist  Phone: [phone]  Speech Desk: [phone]

## 2021-01-04 ENCOUNTER — HOSPITAL ENCOUNTER (OUTPATIENT)
Dept: SPEECH THERAPY | Age: 81
Setting detail: THERAPIES SERIES
Discharge: HOME | End: 2021-01-04
Payer: MEDICARE

## 2021-01-04 PROCEDURE — 92526 ORAL FUNCTION THERAPY: CPT

## 2021-01-04 NOTE — FLOWSHEET NOTE
Speech Language Pathology     [place] [address]. and Therapy, [facility] Jennifer Guzman [address], [address]   Phone: [phone]  Fax [phone]      Speech-Language Pathology  Daily Treatment Note    Date:  2021    Patient Name:  Zenia Tate    :  1940  MRN: [id number]  Restrictions/Precautions:  Dysphagia, aspiration  Diagnosis:    Visit Diagnoses        Codes     Oropharyngeal dysphagia    -  Primary R13.12     Idiopathic Parkinson's disease ([facility] [address]     G20     Treatment Diagnosis:  Moderate oropharyngeal dysphagia  Insurance/Certification information:  Primary: Medicare, Secondary: [employer]   Referring Physician:  Chery Go NP  Plan of care signed (Y/N):  Y  Visit# / total visits:   Pain level: 0/10     Progress Note: []  Yes  [x]  No  Next due by: Visit #8/10 or 21    Subjective: The pt was accompanied by her . Both reported that the pt had File[place] at home over the weekend. They reported that her  cut the meat in small pieces and that she was able to tolerate it without issue. Both reported that a month ago the pt was unable to eat this meat due to choking with it. Both continue to report overall improvements in the pt's swallowing function. .    Objective:   VitalStim (NMES) placed via 3b placement for 51 minutes to target overall improved laryngeal movement during the swallow and swallow initiation timing. Up to 10.5 mA of current was used. Electrodes were also placed bilaterally on her face over the parotid gland area due to continued report of oral dryness. Up to 6.0 mA of current was used in this area with improved saliva production reported. She tolerated NMES/VitalStim with no pain or reported discomfort.      Short-term Goals  Goal 1: The pt will tolerate regular solids without s/s of aspiration or other form of significant dysphagia 10/10 - Bagel: Tolerated 5 of 5 trials without s/s of aspiration occurring; Mastication remains prolonged and labored but was improved compared to last session. Goal 2: The pt will tolerate thin liquids without s/s of aspiration or other form of significant dysphagia 10/10  - Thin water via cup: 14 of 15 trials tolerated; x 1 instance of slight throat clearing after the 5th sip via cup    Other Treatments:  Several laryngeal / pharyngeal strengthening and TBR exercises were completed this date (completed while VitalStim in place)  -Shiela: x 10 reps  -Tongue tip to roof of mouth, hold, and swallow: x 10 reps  -Mendelsohn: x 10 reps; mod cues  -Effortful swallow: x10  -Tongue tip to roof of mouth, hold with pressure: x 10 reps  -Falsetto 'ee': x 10 reps    - Bolus control exercises: 6/6 exercises targeted, 10 reps each. Assessment:   The pt continues to demonstrate overall improvements in therapy. Plan: ST to continue POC for 2x/week for 6 weeks    Total Treatment Time / Charges     Time in Time out Total Time / units   Cognitive Tx      Speech Tx      Dysphagia Tx 0758 6997 60 min / 1 unit       Signature:     Natalie Pacheco, [address] [facility]  [id number]  Speech-Language Pathologist  Phone: [phone]  Speech Desk: [phone]

## 2021-01-07 ENCOUNTER — HOSPITAL ENCOUNTER (OUTPATIENT)
Dept: SPEECH THERAPY | Age: 81
Setting detail: THERAPIES SERIES
Discharge: HOME | End: 2021-01-07
Payer: MEDICARE

## 2021-01-07 PROCEDURE — 92526 ORAL FUNCTION THERAPY: CPT

## 2021-01-07 NOTE — FLOWSHEET NOTE
Speech Language Pathology     [place] [address]. and Therapy, [facility], [address]  Phone: [phone]  Fax [phone]      Speech-Language Pathology  Daily Treatment Note    Date:  2021    Patient Name:  Shae Lacy    :  1940  MRN: [id number]  Restrictions/Precautions:  Dysphagia, aspiration  Diagnosis:    Visit Diagnoses        Codes     Oropharyngeal dysphagia    -  Primary R13.12     Idiopathic Parkinson's disease ([facility] [address]     G20     Treatment Diagnosis:  Moderate oropharyngeal dysphagia  Insurance/Certification information:  Primary: Medicare, [facility]   Referring Physician:  Bernadette Carrizales NP  Plan of care signed (Y/N):  Y  Visit# / total visits:   Pain level: 0/10     Progress Note: [x]  Yes  []  No  Next due by:     Subjective: The pt was accompanied by her  like usual. Both were in good spirits. The pt and her  reported that the pt had some choking/coughing with croutons in a salad. The pt tried these croutons in her salad reportedly for the first time in many months due to improvement swallowing and tolerating other solids recently. Objective:   VitalStim (NMES) placed via 3b placement for 37 minutes to target overall improved laryngeal movement during the swallow and swallow initiation timing. Up to 10.5 mA of current was used. Electrodes were also placed bilaterally on her face over the parotid gland area due to continued report of oral dryness. Up to 6.0 mA of current was used in this area with improved saliva production reported. She tolerated NMES/VitalStim with no pain or reported discomfort.      Short-term Goals  Goal 1: The pt will tolerate regular solids without s/s of aspiration or other form of significant dysphagia 10/10 - Bagel: Tolerated 6 of 6 trials without s/s of aspiration occurring; Mastication remains prolonged and labored but was improved compared to last session. Goal 2: The pt will tolerate thin liquids without s/s of aspiration or other form of significant dysphagia 10/10  - Thin water via cup: 10 of 10 trials tolerated; No s/s of aspiration today      Other Treatments:  Several laryngeal / pharyngeal strengthening and TBR exercises were completed this date (completed while VitalStim in place)  -Shiela: x 10 reps  -Tongue tip to roof of mouth, hold, and swallow: x 10 reps  -Mendelsohn: x 10 reps; mod cues  -Effortful swallow: x10  -Tongue tip to roof of mouth, hold with pressure: x 10 reps  -Falsetto 'ee': x 10 reps    - Bolus control exercises: Did not target    Assessment:   The pt continues to demonstrate overall improvements in therapy. Plan: ST to continue POC for 2x/week for 6 weeks    Total Treatment Time / Charges     Time in Time out Total Time / units   Cognitive Tx      Speech Tx      Dysphagia Tx 0806 70722 60 min / 1 unit       Signature:     Natalie Currie, [address] [facility] [id number]  Speech-Language Pathologist  Phone: [phone]  Speech Desk: [phone]

## 2021-01-07 NOTE — PROGRESS NOTES
[facility]  [address]. and Therapy, [facility],  Ren[place] Ibarra, [address]   Phone: [phone]  Fax [phone]        Speech Therapy Progress Note         The following patient has been evaluated for therapy services. Please review the attached summary of the patient's plan of care, and verify that you agree with plan for additional therapy services at this time. Thank you for the referral of this patient. Please sign the attached certification form. Physician signature_______________________ Date________________      Fax to: [facility] [phone]         Date: 2021        Patient Name:  Gertrude Pineda    :  1940  MRN: [id number]  Restrictions/Precautions:  Dysphagia, aspiration  Diagnosis:         Visit Diagnoses        Codes     Oropharyngeal dysphagia    -  Primary R13.12     Idiopathic Parkinson's disease (H[facility])     G20      Treatment Diagnosis:  Moderate oropharyngeal dysphagia  Insurance/Certification information:  Primary: Medicare, Secondary: [facility]   Referring Physician:  Irene Hester NP  Plan of care signed (Y/N):  Y  Visit# / total visits:   Pain level: 0/10              Time Period for Report:  20 to 21 (9 sessions)  Cancels/No-shows to date:  None    Plan of Care/Treatment to date:  [] Speech-Language Evaluation/Treatment    [x] Dysphagia Evaluation/Treatment        [x] Dysphagia Treatment via Neuromuscular Electrical Stimulation (NMES)   [x] Repeat Modified Barium Swallowing Study if indicated in the future  [] Fiberoptic Endoscopic Evaluation of Swallowing (FEES)    [] Cognitive-Linguistic Skills Development  [] Voice evaluation and Treatment      [] Evaluation, modification, and Training of Voice Prosthetic     [] Evaluation for Speech-Generating Augmentative and Alternative Communication Device   [] Therapeutic Services for the use of Speech-Generating Device.    [] Other: Significant Findings At Last Visit/Comments:    Subjective: The pt was accompanied by her  like usual. Both were in good spirits. The pt and her  reported that the pt had some choking/coughing with croutons in a salad. The pt tried these croutons in her salad reportedly for the first time in many months due to improvement swallowing and tolerating other solids recently. They reported that the pt recently was able to eat an entire portion of Fillet [place] recently, something she choked on and was only able to eat a few bites of prior to beginning dysphagia treatment. Objective:  ? Observation: The pt continues to tolerate NMES/VitalStim without issue. She is demonstrating overall improved pharyngeal clearing and overall swallowing function over the past month. Assessment:  ? Summary: The pt has made significant progress in therapy. Her overall oropharyngeal swallow has greatly improved. She has began eating and tolerating solids that she previously avoided due to her dysphagia. She still demonstrates sporadic instances of dysphagia. This usually occurs with solids of increased texture that are particulate, dry and fibrous. However, considering the significant progress made to this point, further dysphagia treatment for an additional 4 weeks is recommended to attempt to allow the pt to consume all solids and liquids without dysphagia on a consistent basis. See the rest of this report for more details. ? Patient's response to treatment: The pt is hardworking and motivated to improve. Progress towards goals:    Short-term Goals:  Goal 1: The pt will tolerate regular solids without s/s of aspiration or other form of significant dysphagia 10/10  - Bagel: Tolerated 6 of 6 trials without s/s of aspiration occurring; Mastication remains prolonged and labored but was improved compared to last session.    GOAL PARTIALLY MET  Goal 2: The pt will tolerate thin liquids without s/s of aspiration or other form of significant dysphagia 10/10  - Thin water via cup: 10 of 10 trials tolerated; No s/s of aspiration today  WILL D/C GOAL AS MET IF ABLE TO MAINTAIN TOLERANCE        Recommended Diet and Intervention  Solid: Diet Solids Recommendation: Regular(Avoid tough, chewy, dry, particulate and stringy foods)  Liquid: Liquid Consistency Recommendation: Thin  Medication:Recommended Form of Meds: Meds in puree      Current Frequency/Duration:  # Days per week: [] 1 day # Weeks: [] 1 week [x] 4 weeks      [x] 2 days   [] 2 weeks [] 5 weeks      [] 3 days   [] 3 weeks [] 6 weeks     Rehab Potential: [] Excellent [x] Good [] Fair  [] Poor     Goal Status:  [] Achieved [x] Partially Achieved  [] Not Achieved     Patient Status: [] Continue per initial plan of Care     [] Patient now discharged     [x] Additional visits requested, Please re-certify for additional visits:      Requested frequency/duration: 2 X/week for 4 weeks    Electronically signed by:  JAZZ Wagner  Phone: [phone]  Fax: [phone]    If you have any questions or concerns, please don't hesitate to call.   Thank you for your referral.

## 2021-01-08 RX ORDER — FLUTICASONE PROPIONATE 50 MCG
SPRAY, SUSPENSION (ML) NASAL
Qty: 3 BOTTLE | Refills: 10 | Status: SHIPPED | OUTPATIENT
Start: 2021-01-08

## 2021-01-08 RX ORDER — HYDRALAZINE HYDROCHLORIDE 25 MG/1
TABLET, FILM COATED ORAL
Qty: 180 TABLET | Refills: 1 | Status: SHIPPED | OUTPATIENT
Start: 2021-01-08 | End: 2021-03-08 | Stop reason: ALTCHOICE

## 2021-01-08 RX ORDER — ALBUTEROL SULFATE 90 UG/1
2 AEROSOL, METERED RESPIRATORY (INHALATION) EVERY 6 HOURS PRN
Qty: 3 INHALER | Refills: 1 | Status: SHIPPED | OUTPATIENT
Start: 2021-01-08 | End: 2021-07-14

## 2021-01-08 NOTE — TELEPHONE ENCOUNTER
Last office visit 8/21/2020       Next office visit scheduled 2/26/2021    Requested Prescriptions     Pending Prescriptions Disp Refills    albuterol sulfate HFA (VENTOLIN HFA) 108 (90 Base) MCG/ACT inhaler 3 Inhaler 1     Sig: Inhale 2 puffs into the lungs every 6 hours as needed for Wheezing    hydrALAZINE (APRESOLINE) 25 MG tablet 180 tablet 1     Sig: Take 1 tablet by mouth twice daily.     fluticasone (FLONASE) 50 MCG/ACT nasal spray 3 Bottle 10     Sig: USE 2 PUFFS IN EACH NOSTRIL DAILY

## 2021-01-11 ENCOUNTER — HOSPITAL ENCOUNTER (OUTPATIENT)
Dept: SPEECH THERAPY | Age: 81
Setting detail: THERAPIES SERIES
Discharge: HOME | End: 2021-01-11
Payer: MEDICARE

## 2021-01-11 DIAGNOSIS — I50.32 CHRONIC DIASTOLIC CONGESTIVE HEART FAILURE (HCC): ICD-10-CM

## 2021-01-11 PROCEDURE — 92526 ORAL FUNCTION THERAPY: CPT

## 2021-01-11 RX ORDER — FUROSEMIDE 40 MG/1
20 TABLET ORAL DAILY
Qty: 60 TABLET | Refills: 5 | Status: SHIPPED | OUTPATIENT
Start: 2021-01-11 | End: 2022-03-02

## 2021-01-11 RX ORDER — ATORVASTATIN CALCIUM 40 MG/1
40 TABLET, FILM COATED ORAL DAILY
Qty: 30 TABLET | Refills: 5 | Status: SHIPPED | OUTPATIENT
Start: 2021-01-11 | End: 2021-01-13 | Stop reason: SDUPTHER

## 2021-01-11 RX ORDER — PROPAFENONE HYDROCHLORIDE 150 MG/1
150 TABLET, FILM COATED ORAL EVERY 8 HOURS SCHEDULED
Qty: 270 TABLET | Refills: 3 | Status: SHIPPED | OUTPATIENT
Start: 2021-01-11 | End: 2021-11-03

## 2021-01-11 RX ORDER — NIFEDIPINE 60 MG/1
60 TABLET, FILM COATED, EXTENDED RELEASE ORAL DAILY
Qty: 90 TABLET | Refills: 3 | Status: SHIPPED | OUTPATIENT
Start: 2021-01-11 | End: 2021-11-03

## 2021-01-11 NOTE — FLOWSHEET NOTE
Speech Language Pathology     [facility]. and Therapy, [facility],  Renetta Jhonatanjeffrey Ibarra, [address]  Phone: [phone]  Fax [phone]      Speech-Language Pathology  Daily Treatment Note    Date:  2021    Patient Name:  Marta Reddy    :  1940  MRN: [id number]  Restrictions/Precautions:  Dysphagia, aspiration  Diagnosis:    Visit Diagnoses        Codes     Oropharyngeal dysphagia    -  Primary R13.12     Idiopathic Parkinson's disease ([facility] [address]     G20     Treatment Diagnosis:  Moderate oropharyngeal dysphagia  Insurance/Certification information:  Primary: Medicare, Secondary: [facility]   Referring Physician:  Cheng Spangler NP  Plan of care signed (Y/N):  Y  Visit# / total visits: 10/13  Pain level: 0/10     Progress Note: []  Yes  [x]  No  Next due by: 1/10 or  21    Subjective: The pt was accompanied by her  like usual. Both denied having any significant dysphagia since last session. Objective:   VitalStim (NMES) placed via 3b placement for 41minutes to target overall improved laryngeal movement during the swallow and swallow initiation timing. Up to 8.5  mA of current was used. Electrodes were also placed bilaterally on her face over the parotid gland area due to continued report of oral dryness. Up to 6.0 mA of current was used in this area with improved saliva production reported. She tolerated NMES/VitalStim with no pain or reported discomfort. Short-term Goals:  Goal 1: The pt will tolerate regular solids without s/s of aspiration or other form of significant dysphagia 10/10  - Bagel: Tolerated 5 of 5 trials without s/s of aspiration occurring; Mastication remains prolonged and labored needing prolonged extra time.     Goal 2: The pt will tolerate thin liquids without s/s of aspiration or other form of significant dysphagia 10/10

## 2021-01-13 ENCOUNTER — TELEPHONE (OUTPATIENT)
Dept: FAMILY MEDICINE CLINIC | Age: 81
End: 2021-01-13

## 2021-01-13 DIAGNOSIS — J44.9 CHRONIC OBSTRUCTIVE PULMONARY DISEASE, UNSPECIFIED COPD TYPE (HCC): Primary | ICD-10-CM

## 2021-01-13 RX ORDER — FLUTICASONE FUROATE AND VILANTEROL TRIFENATATE 100; 25 UG/1; UG/1
1 POWDER RESPIRATORY (INHALATION) DAILY
Qty: 90 EACH | Refills: 1 | Status: SHIPPED | OUTPATIENT
Start: 2021-01-13 | End: 2021-07-13

## 2021-01-13 NOTE — TELEPHONE ENCOUNTER
Alternatives sent to the pharmacy. Please call patient and let her know her inhalers are changing. Instead of budesonide and serevent, will now be on breo ellipta and striverdi.

## 2021-01-13 NOTE — TELEPHONE ENCOUNTER
Serevent Diskus is not covered by pt's insurance. [facility] does not require a [facility]. Would you like to change the prescription or run a PA? PA needed for Serevent Diskus 50 mcg/dose aerosol  [employer]  Key: [id number]      Pulmicort Flexhaler 180 mcg/act aerosol powder is not covered by pt's ins. Alternates on formulary:  No PA required:  Flovent  Mcg/Actuation HFAA  Symbicort 160-4.5 Mch/Actuation HFAA  Arnuity Ellipta 100 Mcg Actuation Dsdv  Flovent Diskus 100 Mcg/Actuation Dsdv  Breo Ellipta 100-25 Mcg/Actuation Dsdv  PA required:  Budesonide-Formoterol 160-4.5 Mc/Actuation HFAA  Asmanex Twisthaler 220 Mcg/Actuation (120) Aepb  Asmanex  Mcg/Actuation HFAA    OR run PA    PA needed for Pulmicort Flexhaler 10 mcg/ACT Aerosol Inhaler  [email or web address]  [place]: Rico Rg

## 2021-01-13 NOTE — TELEPHONE ENCOUNTER
pts  called the office. She need refill for lipitor 60mg daily. Send to [facility]    LOV 6/2020  Scheduled 3/8/21

## 2021-01-14 ENCOUNTER — HOSPITAL ENCOUNTER (OUTPATIENT)
Dept: SPEECH THERAPY | Age: 81
Setting detail: THERAPIES SERIES
Discharge: HOME | End: 2021-01-14
Payer: MEDICARE

## 2021-01-14 PROCEDURE — 92526 ORAL FUNCTION THERAPY: CPT

## 2021-01-14 RX ORDER — ATORVASTATIN CALCIUM 40 MG/1
60 TABLET, FILM COATED ORAL DAILY
Qty: 135 TABLET | Refills: 3 | Status: SHIPPED | OUTPATIENT
Start: 2021-01-14 | End: 2021-11-04

## 2021-01-14 NOTE — FLOWSHEET NOTE
Speech Language Pathology     [facility]. and Therapy, [facility],  Slickleandra Brittanie  Fred, [address]  Phone: [phone]  Fax [phone]      Speech-Language Pathology  Daily Treatment Note    Date:  2021    Patient Name:  Malika Leal    :  1940  MRN: [id number]  Restrictions/Precautions:  Dysphagia, aspiration  Diagnosis:    Visit Diagnoses        Codes     Oropharyngeal dysphagia    -  Primary R13.12     Idiopathic Parkinson's disease ([facility] [address]     G20     Treatment Diagnosis:  Moderate oropharyngeal dysphagia  Insurance/Certification information:  Primary: Medicare, Secondary: [facility]   Referring Physician:  Narciso Smyth NP  Plan of care signed (Y/N):  Y  Visit# / total visits:   Pain level: 0/10     Progress Note: []  Yes  [x]  No  Next due by: 2/10 or  21    Subjective: The pt was accompanied by her  like usual. They both continue to deny the pt having any dysphagia symptoms. Objective:   VitalStim (NMES) placed via 3b placement for 42 minutes to target overall improved laryngeal movement during the swallow and swallow initiation timing. Up to 9.0  mA of current was used. Electrodes were also placed bilaterally on her face over the parotid gland area due to continued report of oral dryness. Up to 6.5 mA of current was used in this area with improved saliva production reported. She tolerated NMES/VitalStim with no pain or reported discomfort. Short-term Goals:  Goal 1: The pt will tolerate regular solids without s/s of aspiration or other form of significant dysphagia 10/10  - Mixed Nuts: Tolerated 5 of 5 trials without s/s of aspiration occurring; Mastication remains prolonged and labored needing prolonged extra time. Goal 2: The pt will tolerate thin liquids without s/s of aspiration or other form of significant dysphagia 10/10  - Thin water via cup: 10 of 10 trials tolerated; No s/s of aspiration. Other Treatments:  Several laryngeal / pharyngeal strengthening and TBR exercises were completed this date (completed while VitalStim in place)  -Shiela: x 10 reps  -Tongue tip to roof of mouth, hold, and swallow: x 10 reps  -Mendelsohn: Did not target  -Effortful swallow: x10  -Tongue tip to roof of mouth, hold with pressure: x 10 reps  -Falsetto 'ee': x 10 reps    - Bolus control exercises: 6/6 exercises completed, 10 reps    Recommendations:  Solid: Diet Solids Recommendation: Regular(Avoid tough, chewy, dry, particulate and stringy foods)  Liquid: Liquid Consistency Recommendation: Thin  Medication:Recommended Form of Meds: Meds in puree    Assessment:   The pt continues to demonstrate overall improvements in therapy. Plan: ST to continue POC for 2x/week for 6 weeks    Total Treatment Time / Charges     Time in Time out Total Time / units   Cognitive Tx      Speech Tx      Dysphagia Tx 0757 3705 50 min / 1 unit       Signature:     Yudith Geronimo, [address]  [id number]  Speech-Language Pathologist  Phone: [phone]  Speech Desk: [phone]

## 2021-01-18 ENCOUNTER — TELEPHONE (OUTPATIENT)
Dept: FAMILY MEDICINE CLINIC | Age: 81
End: 2021-01-18

## 2021-01-18 ENCOUNTER — HOSPITAL ENCOUNTER (OUTPATIENT)
Dept: SPEECH THERAPY | Age: 81
Setting detail: THERAPIES SERIES
Discharge: HOME | End: 2021-01-18
Payer: MEDICARE

## 2021-01-18 PROCEDURE — 92526 ORAL FUNCTION THERAPY: CPT

## 2021-01-18 NOTE — TELEPHONE ENCOUNTER
Pharmacy requesting clarification. They received 2 RX's on the day date for Striverdi Respimat and Breo Ellipta. Which one do you want them to dispense.      Phone: [phone]

## 2021-01-18 NOTE — FLOWSHEET NOTE
- Thin water via cup: 8 of 8 trials tolerated; No s/s of aspiration. Other Treatments:  Several laryngeal / pharyngeal strengthening and TBR exercises were completed this date (completed while VitalStim in place)  -Shiela: x 10 reps  -Tongue tip to roof of mouth, hold, and swallow: x 10 reps  -Mendelsohn: x 10 reps  -Effortful swallow: x10  -Tongue tip to roof of mouth, hold with pressure: x 10 reps  -Falsetto 'ee': x 10 reps    - Bolus control exercises: Did not target    Recommendations:  Solid: Diet Solids Recommendation: Regular(Avoid tough, chewy, dry, particulate and stringy foods)  Liquid: Liquid Consistency Recommendation: Thin  Medication:Recommended Form of Meds: Meds in puree    Assessment:   The pt continues to demonstrate overall improvements in therapy. Plan: ST to continue POC for 2x/week for 6 weeks    Total Treatment Time / Charges     Time in Time out Total Time / units   Cognitive Tx      Speech Tx      Dysphagia Tx 0800 0848 48 min / 1 unit       Signature:     Natalie Carlson [id number]  Speech-Language Pathologist  Phone: [phone]  Speech Desk: [phone]

## 2021-01-21 ENCOUNTER — HOSPITAL ENCOUNTER (OUTPATIENT)
Dept: SPEECH THERAPY | Age: 81
Setting detail: THERAPIES SERIES
Discharge: HOME | End: 2021-01-21
Payer: MEDICARE

## 2021-01-21 PROCEDURE — 92526 ORAL FUNCTION THERAPY: CPT

## 2021-01-21 NOTE — FLOWSHEET NOTE
Speech Language Pathology     [facility], [facility],  Renetta Ibarra, [address]  Phone: [phone]  Fax [phone]      Speech-Language Pathology  Daily Treatment Note    Date:  2021    Patient Name:  Unruly Gomez    :  1940  MRN: [id number]  Restrictions/Precautions:  Dysphagia, aspiration  Diagnosis:    Visit Diagnoses        Codes     Oropharyngeal dysphagia    -  Primary R13.12     Idiopathic Parkinson's disease ([facility] [address]     G20     Treatment Diagnosis:  Moderate oropharyngeal dysphagia  Insurance/Certification information:  Primary: Medicare, Secondary: [place] [facility]   Referring Physician:  Steffi Hernandez NP  Plan of care signed (Y/N):  Y  Visit# / total visits:   Pain level: 0/10     Progress Note: []  Yes  [x]  No  Next due by: 4/10 or  21    Subjective: The pt was accompanied by her  like usual. Both were in good spirits. Objective:   VitalStim (NMES) placed via 3b placement for 30 minutes to target overall improved laryngeal movement during the swallow and swallow initiation timing. Up to 8.0  mA of current was used. Electrodes were also placed bilaterally on her face over the parotid gland area due to continued report of oral dryness. Up to 5.0 mA of current was used in this area with improved saliva production reported. She tolerated NMES/VitalStim with no pain or reported discomfort. Short-term Goals:  Goal 1: The pt will tolerate regular solids without s/s of aspiration or other form of significant dysphagia 10/10  - Bagel: Only x 2 trials were given due to poor chewing efficiency and prolonged time to chew each bolus. No actual s/s of aspiration occurred. Goal 2: The pt will tolerate thin liquids without s/s of aspiration or other form of significant dysphagia 10/10  - Thin water via cup: 10 of 10 trials tolerated; No s/s of aspiration.     Other Treatments: Several laryngeal / pharyngeal strengthening and TBR exercises were completed this date (completed while VitalStim in place)  -Shiela: x 10 reps  -Tongue tip to roof of mouth, hold, and swallow: x 10 reps  -Mendelsohn: x 10 reps  -Effortful swallow: x10  -Tongue tip to roof of mouth, hold with pressure: x 10 reps  -Falsetto 'ee': x 10 reps    - Bolus control exercises: Did not target    Recommendations:  Solid: Diet Solids Recommendation: Regular(Avoid tough, chewy, dry, particulate and stringy foods)  Liquid: Liquid Consistency Recommendation: Thin  Medication:Recommended Form of Meds: Meds in puree    Assessment:   The pt continues to demonstrate overall improvements in therapy. Plan: ST to continue POC for 2x/week for 6 weeks    Total Treatment Time / Charges     Time in Time out Total Time / units   Cognitive Tx      Speech Tx      Dysphagia Tx 0800 0845 45 min / 1 unit       Signature:     Natalie Fish, [address] [facility]  [id number]  Speech-Language Pathologist  Phone: [phone]  Speech Desk: [phone]

## 2021-01-22 NOTE — TELEPHONE ENCOUNTER
Last office visit 8/21/2020     Last written *9-8-2020 x 3 refills    Next office visit scheduled 2/26/2021    Requested Prescriptions     Pending Prescriptions Disp Refills    budesonide (PULMICORT FLEXHALER) 180 MCG/ACT AEPB inhaler 3 each 3     Sig: Inhale 2 puffs into the lungs 2 times daily

## 2021-01-25 ENCOUNTER — HOSPITAL ENCOUNTER (OUTPATIENT)
Dept: SPEECH THERAPY | Age: 81
Setting detail: THERAPIES SERIES
Discharge: HOME | End: 2021-01-25
Payer: MEDICARE

## 2021-01-25 ENCOUNTER — HOSPITAL ENCOUNTER (OUTPATIENT)
Age: 81
Discharge: HOME | End: 2021-01-25
Payer: MEDICARE

## 2021-01-25 LAB
ANION GAP SERPL CALCULATED.3IONS-SCNC: 10 MMOL/L (ref 3–16)
BASOPHILS ABSOLUTE: 0.1 K/UL (ref 0–0.2)
BASOPHILS RELATIVE PERCENT: 0.6 %
BUN BLDV-MCNC: 26 MG/DL (ref 7–20)
CALCIUM SERPL-MCNC: 9.1 MG/DL (ref 8.3–10.6)
CHLORIDE BLD-SCNC: 101 MMOL/L (ref 99–110)
CO2: 29 MMOL/L (ref 21–32)
CREAT SERPL-MCNC: 1.7 MG/DL (ref 0.6–1.2)
EOSINOPHILS ABSOLUTE: 0.2 K/UL (ref 0–0.6)
EOSINOPHILS RELATIVE PERCENT: 1.6 %
FERRITIN: 37.7 NG/ML (ref 15–150)
GFR AFRICAN AMERICAN: 35
GFR NON-AFRICAN AMERICAN: 29
GLUCOSE BLD-MCNC: 76 MG/DL (ref 70–99)
HCT VFR BLD CALC: 34 % (ref 36–48)
HEMOGLOBIN: 10.7 G/DL (ref 12–16)
IRON SATURATION: 20 % (ref 15–50)
IRON: 55 UG/DL (ref 37–145)
LYMPHOCYTES ABSOLUTE: 1.5 K/UL (ref 1–5.1)
LYMPHOCYTES RELATIVE PERCENT: 15.1 %
MCH RBC QN AUTO: 27.5 PG (ref 26–34)
MCHC RBC AUTO-ENTMCNC: 31.5 G/DL (ref 31–36)
MCV RBC AUTO: 87.3 FL (ref 80–100)
MONOCYTES ABSOLUTE: 0.7 K/UL (ref 0–1.3)
MONOCYTES RELATIVE PERCENT: 7.2 %
NEUTROPHILS ABSOLUTE: 7.5 K/UL (ref 1.7–7.7)
NEUTROPHILS RELATIVE PERCENT: 75.5 %
PDW BLD-RTO: 15 % (ref 12.4–15.4)
PLATELET # BLD: 301 K/UL (ref 135–450)
PMV BLD AUTO: 9.2 FL (ref 5–10.5)
POTASSIUM SERPL-SCNC: 4.7 MMOL/L (ref 3.5–5.1)
RBC # BLD: 3.9 M/UL (ref 4–5.2)
SODIUM BLD-SCNC: 140 MMOL/L (ref 136–145)
TOTAL IRON BINDING CAPACITY: 278 UG/DL (ref 260–445)
WBC # BLD: 9.9 K/UL (ref 4–11)

## 2021-01-25 PROCEDURE — 80048 BASIC METABOLIC PNL TOTAL CA: CPT

## 2021-01-25 PROCEDURE — 36415 COLL VENOUS BLD VENIPUNCTURE: CPT

## 2021-01-25 PROCEDURE — 82728 ASSAY OF FERRITIN: CPT

## 2021-01-25 PROCEDURE — 92526 ORAL FUNCTION THERAPY: CPT

## 2021-01-25 PROCEDURE — 83550 IRON BINDING TEST: CPT

## 2021-01-25 PROCEDURE — 85025 COMPLETE CBC W/AUTO DIFF WBC: CPT

## 2021-01-25 PROCEDURE — 83540 ASSAY OF IRON: CPT

## 2021-01-28 ENCOUNTER — HOSPITAL ENCOUNTER (OUTPATIENT)
Dept: SPEECH THERAPY | Age: 81
Setting detail: THERAPIES SERIES
Discharge: HOME | End: 2021-01-28
Payer: MEDICARE

## 2021-01-28 PROCEDURE — 92526 ORAL FUNCTION THERAPY: CPT

## 2021-01-28 NOTE — FLOWSHEET NOTE
Speech Language Pathology     [facility]. and Therapy, [facility], [address], [address]   Phone: [phone]  Fax [phone]      Speech-Language Pathology  Daily Treatment Note    Date:  2021    Patient Name:  Alexander Cardona    :  1940  MRN: [id number]  Restrictions/Precautions:  Dysphagia, aspiration  Diagnosis:    Visit Diagnoses        Codes     Oropharyngeal dysphagia    -  Primary R13.12     Idiopathic Parkinson's disease ([facility] [address]     G20     Treatment Diagnosis:  Moderate oropharyngeal dysphagia  Insurance/Certification information:  Primary: Medicare, Secondary: [address]   Referring Physician:  Gene Plascencia NP  Plan of care signed (Y/N):  Y  Visit# / total visits: 3/12  Pain level: 0/10     Progress Note: []  Yes  [x]  No  Next due by: 6/10 or  21    Subjective: The pt was accompanied by her . Both were in good spirits. Objective:   VitalStim (NMES) placed via 3a placement for 41 minutes to target overall improved laryngeal movement during the swallow and swallow initiation timing. Up to 10.0  mA of current was used. Electrodes were also placed bilaterally on her face over the parotid gland area due to continued report of oral dryness. Up to 5.5 mA of current was used in this area with improved saliva production reported. She tolerated NMES/VitalStim with no pain or reported discomfort. Short-term Goals:  Goal 1: The pt will tolerate regular solids without s/s of aspiration or other form of significant dysphagia 10/10  - Chewy granola bar with peanuts in it: 0/2; Prolonged mastication; delayed throat clearing after both trials; further trials were held. Goal 2: The pt will tolerate thin liquids without s/s of aspiration or other form of significant dysphagia 10/10  - Thin water via cup: 8 of 8 trials tolerated; No overt s/s of aspiration.     Other Treatments: Several laryngeal / pharyngeal strengthening and TBR exercises were completed this date (completed while VitalStim in place)  -Shiela: x 5 reps  -Tongue tip to roof of mouth, hold, and swallow: x 5 reps  -Mendelsohn: x 5 reps  -Tongue tip to roof of mouth, hold with pressure: x 5 reps  -Falsetto 'ee': x 5 reps    - Bolus control exercises: 6/6 exercises completed, 10 reps each. Recommendations:  Solid: Diet Solids Recommendation: Regular(Avoid tough, chewy, dry, particulate and stringy foods)  Liquid: Liquid Consistency Recommendation: Thin  Medication:Recommended Form of Meds: Meds in puree    Assessment:   The pt continues to demonstrate improvements in swallowing function    Plan: ST to continue POC for 2x/week for 6 weeks    Total Treatment Time / Charges     Time in Time out Total Time / units   Cognitive Tx      Speech Tx      Dysphagia Tx 0800 0854 54 min / 1 unit       Signature:     Natalie Vargas, [address] [facility]  [id number]  Speech-Language Pathologist  Phone: [phone]  Speech Desk: [phone]

## 2021-02-01 ENCOUNTER — HOSPITAL ENCOUNTER (OUTPATIENT)
Dept: SPEECH THERAPY | Age: 81
Setting detail: THERAPIES SERIES
Discharge: HOME | End: 2021-02-01
Payer: MEDICARE

## 2021-02-01 PROCEDURE — 92526 ORAL FUNCTION THERAPY: CPT

## 2021-02-01 NOTE — TELEPHONE ENCOUNTER
I called and spoke to [facility] informed her that pt should only be on Breo and to discontinue Pulmicort

## 2021-02-01 NOTE — FLOWSHEET NOTE
Speech Language Pathology     [place] [address]. and Therapy, [facility], [address] [place] [address],[address], [address] [place] [address]  Phone: [phone]  Fax [phone]      Speech-Language Pathology  Daily Treatment Note    Date:  2021    Patient Name:  Gertrude Pineda    :  1940  MRN: [id number]  Restrictions/Precautions:  Dysphagia, aspiration  Diagnosis:    Visit Diagnoses        Codes     Oropharyngeal dysphagia    -  Primary R13.12     Idiopathic Parkinson's disease ([facility] [address]     G20     Treatment Diagnosis:  Moderate oropharyngeal dysphagia  Insurance/Certification information:  Primary: Medicare, Secondary: [employer]   Referring Physician:  Irene Hester NP  Plan of care signed (Y/N):  Y  Visit# / total visits:   Pain level: 0/10     Progress Note: []  Yes  [x]  No  Next due by: 7/10 or  21    Subjective: The pt was accompanied by her  like usual. She reported continued difficulty with breads and ground beef. Objective:   VitalStim (NMES) placed via 3a placement for 42  minutes to target overall improved laryngeal movement during the swallow and swallow initiation timing. Up to 9.5  mA of current was used. Electrodes were also placed bilaterally on her face over the parotid gland area due to continued report of oral dryness. Up to 5.0 mA of current was used in this area with improved saliva production reported. She tolerated NMES/VitalStim with no pain or reported discomfort. Short-term Goals:  Goal 1: The pt will tolerate regular solids without s/s of aspiration or other form of significant dysphagia 10/10  - Mixed: 3/4 tolerated without s/s of aspiration; Prolonged mastication; mild coughing and gag noted after the 3rd trial which was an almond. Goal 2: The pt will tolerate thin liquids without s/s of aspiration or other form of significant dysphagia 10/10  - Thin water via cup: 8 of 8 trials tolerated; No overt s/s of aspiration. Other Treatments:  Several laryngeal / pharyngeal strengthening and TBR exercises were completed this date (completed while VitalStim in place)  -Shiela: x 15 reps  -Tongue tip to roof of mouth, hold, and swallow: x 10 reps  -Mendelsohn: x 10 reps  -Tongue tip to roof of mouth, hold with pressure: x 15 reps  -Falsetto 'ee': x 15 reps    - Bolus control exercises: 6/6 exercises completed, 10 reps each. Recommendations:  Solid: Diet Solids Recommendation: Regular(Avoid tough, chewy, dry, particulate and stringy foods)  Liquid: Liquid Consistency Recommendation: Thin  Medication:Recommended Form of Meds: Meds in puree    Assessment:   The pt continues to demonstrate improvements in swallowing function    Plan: ST to continue POC for 2x/week for 6 weeks    Total Treatment Time / Charges     Time in Time out Total Time / units   Cognitive Tx      Speech Tx      Dysphagia Tx 0800 0900 60 min / 1 unit       Signature:     Chetan Crook, [address] [place] [id number]  Speech-Language Pathologist  Phone: [phone]  Speech Desk: [phone]

## 2021-02-01 NOTE — TELEPHONE ENCOUNTER
[facility] has [facility] for breo, please clarify current therapy     [phone] [phone]

## 2021-02-04 ENCOUNTER — HOSPITAL ENCOUNTER (OUTPATIENT)
Dept: SPEECH THERAPY | Age: 81
Setting detail: THERAPIES SERIES
Discharge: HOME | End: 2021-02-04
Payer: MEDICARE

## 2021-02-04 PROCEDURE — 92526 ORAL FUNCTION THERAPY: CPT

## 2021-02-04 NOTE — PROGRESS NOTES
[facility]  [facility]. and Therapy, [facility] [place] [place] [facility] Un[place]andra Gu  [facility], [address]   Phone: [phone]  Fax [phone]        Speech Therapy Progress Note         The following patient has been evaluated for therapy services. Please review the attached summary of the patient's plan of care, and verify that you agree with plan for additional therapy services at this time. Thank you for the referral of this patient. Please sign the attached certification form. Physician signature_______________________ Date________________      Fax to: [facility] [phone]         Date: 2021        Patient Name:  Aly Bird    :  1940  MRN: [id number]  Restrictions/Precautions:  Dysphagia, aspiration  Diagnosis:         Visit Diagnoses        Codes     Oropharyngeal dysphagia    -  Primary R13.12     Idiopathic Parkinson's disease (H[facility])     G20      Treatment Diagnosis:  Moderate oropharyngeal dysphagia  Insurance/Certification information:  Primary: Medicare, Secondary: [facility]   Referring Physician:  Ashwini Seals NP  Plan of care signed (Y/N):  Y  Visit# / total visits: /12  Pain level: 0/10              Time Period for Report:  21-21 (8 sessions)  Cancels/No-shows to date:  None    Plan of Care/Treatment to date:  [] Speech-Language Evaluation/Treatment    [x] Dysphagia Evaluation/Treatment        [x] Dysphagia Treatment via Neuromuscular Electrical Stimulation (NMES)   [x] Repeat Modified Barium Swallowing Study if indicated in the future  [] Fiberoptic Endoscopic Evaluation of Swallowing (FEES)    [] Cognitive-Linguistic Skills Development  [] Voice evaluation and Treatment      [] Evaluation, modification, and Training of Voice Prosthetic     [] Evaluation for Speech-Generating Augmentative and Alternative Communication Device   [] Therapeutic Services for the use of Speech-Generating Device.    [] Other: Significant Findings At Last Visit/Comments:    Subjective: The pt was accompanied by her  like usual. Both were in good spirits. The pt and her  reported that the pt had some choking/coughing with hamburgers. She reports that ground beef and breads have been difficult with the very fine pieces and crumbs. She tried hamburgers a couple times, but has since started avoiding them due to difficulty. Objective:  ? Observation: The pt continues to tolerate NMES/VitalStim without issue. She is demonstrating overall improved pharyngeal clearing and overall swallowing function over the past month, however regular solids with small fine pieces have been difficult to clear. Assessment:  ? Summary: The pt has made significant progress in therapy. Her overall oropharyngeal swallow has greatly improved. She has began eating and tolerating solids that she previously avoided due to her dysphagia, for example filet mignon. She still demonstrates sporadic instances of dysphagia. This usually occurs with solids of increased texture that are particulate, dry and fibrous. She has reported difficulty with hamburgers and breads and has demonstrated difficulty during sessions with mixed nuts, specifically almonds. Considering the significant progress made to this point, further dysphagia treatment for an additional 2 weeks is recommended to attempt to allow the pt to consume all solids and liquids without dysphagia on a consistent basis. See the rest of this report for more details. ? Patient's response to treatment: The pt is very motivated and willing to work hard to improve. Progress towards goals:    Short-term Goals:  Goal 1: The pt will tolerate regular solids without s/s of aspiration or other form of significant dysphagia 10/10  - Mixed nuts: 4/5 tolerated without s/s of aspiration; Prolonged mastication; moderate coughing and gag noted after the 2nd trial which was an almond. Paula Avila  GOAL PARTIALLY MET     Goal 2: The pt will tolerate thin liquids without s/s of aspiration or other form of significant dysphagia 10/10  - Thin water via cup: 20 of 20 tolerated; No overt s/s of aspiration. GOAL MET        Recommended Diet and Intervention  Solid: Diet Solids Recommendation: Regular(Avoid tough, chewy, dry, particulate and stringy foods)  Liquid: Liquid Consistency Recommendation: Thin  Medication:Recommended Form of Meds: Meds in puree      Current Frequency/Duration:  # Days per week: [] 1 day # Weeks: [] 1 week [x] 4 weeks      [x] 2 days   [] 2 weeks [] 5 weeks      [] 3 days   [] 3 weeks [] 6 weeks     Rehab Potential: [] Excellent [x] Good [] Fair  [] Poor     Goal Status:  [] Achieved [x] Partially Achieved  [] Not Achieved     Patient Status: [] Continue per initial plan of Care     [] Patient now discharged     [x] Additional visits requested, Please re-certify for additional visits:      Requested frequency/duration: 2 X/week for 2 weeks    Electronically signed by:  Birdie Elena  Phone: [phone]  Fax: [facility] [address]  [id number]  Speech-Language Pathologist  Phone #: [phone]  Fax #: [phone]        If you have any questions or concerns, please don't hesitate to call.   Thank you for your referral.

## 2021-02-04 NOTE — PROGRESS NOTES
[facility] - Swallowing      Patient: Shae Lacy  : 1940  MRN: [id number]  Date: 2021  Electronically Signed by: Kali Figueroa MA, CCC-SLP       Note: In Levels 3-5, some patients may meet only one of the \"and/or\" criteria listed. If you have difficulty deciding on the most appropriate level for an individual, use dietary level as the most important criterion if the dietary level is the result of swallow function rather than dentition only. Dietary levels at [facility] 6 and 7 should be judged only on swallow function, and any influence of poor dentition should be disregarded. []  Level 1 Individual is not able to swallow anything safely by mouth. All nutrition and hydration is received through non-oral means (e.g., nasogastric tube, PEG)    []  Level 2 Individual is not able to swallow safely by mouth for nutrition and hydration, but may take some consistency with consistent maximal cues in therapy only. Alternative method of feeding required    []  Level 3  Alternative method of feeding required as individual takes less than 50% of nutrition and hydration by mouth, and/or swallowing is safe with consistent use of moderate cues to use compensatory strategies and/or requires maximum diet restriction    []  Level 4 Swallowing is safe, but usually requires moderate cues to use compensatory strategies, and/or the individual has moderate diet restrictions and /or still requires tube feeding and/or oral supplements    []  Level 5 Swallowing is safe with minimal diet restriction and/or occasionally requires minimal cueing to use compensatory strategies. The individual may occasionally self-cue.  All nutrition and hydration needs are met by mouth at mealtime [x]  Level 6 Swallowing is safe, and the individual eats and drinks independently and may rarely require minimal cueing. The individual usually self-cues when difficulty occurs. May need to avoid specific food items (e.g., popcorn and nuts), or require additional time (due to dysphasia)    []  Level 7 The individual's ability to eat independently is not limited by swallow function. Swallowing would be safe and efficient for all consistencies. Compensatory strategies are effectively used when needed       Diet levels/restrictions are defined on next page. Please use levels as a guide in scoring this FCM                                        Swallowing: Dietary Levels/Restrictions  · Maximum Restrictions: Diet is two or more levels below a regular diet status and liquid consistency  · Moderate Restrictions: Diet is two or more levels below a regular diet status in either solid or liquid consistency (but not both), OR diet is one level below in both solid and liquid consistency   · Minimum Restrictions: Diet is one level below a regular diet status in solid or liquid consistency    Solids  · Regular: No restrictions  · Reduced One Level: Meats are cooked until soft, with not tough or stringy foods. Might include meats like meat loaf, baked fish, and soft chicken. Vegetables are cooked soft  · Reduced Two Levels: Meats are chopped or ground. Vegetables are one consistency (e.g., souffle, baked potato) or are mashed with a fork  · Reduced Three Levels: Meats and vegetables are pureed    Liquids  · Regular: Thin liquids;  No restrictions  · Reduced One Level: Nectar, syrup; mildly thick    · Reduced Two Levels: Honey; moderately thick  · Reduced Three Levels: Pudding; extra thick

## 2021-02-08 ENCOUNTER — HOSPITAL ENCOUNTER (OUTPATIENT)
Dept: SPEECH THERAPY | Age: 81
Setting detail: THERAPIES SERIES
Discharge: HOME | End: 2021-02-08
Payer: MEDICARE

## 2021-02-08 PROCEDURE — 92526 ORAL FUNCTION THERAPY: CPT

## 2021-02-08 NOTE — FLOWSHEET NOTE
- Mixed nuts: 1/2 tolerated without s/s of aspiration; Prolonged mastication; Noted coughing with the 2nd nut which appeared to be 2/2 to pt talking during the swallowing process. Education was given re: need to avoid talking during this time. Goal 2: The pt will tolerate thin liquids without s/s of aspiration or other form of significant dysphagia 10/10  - GOAL MET    Other Treatments:  Several laryngeal / pharyngeal strengthening and TBR exercises were completed this date (completed while VitalStim in place)  -Shiela: x 5 reps  -Tongue tip to roof of mouth, hold, and swallow: x 5 reps  -Mendelsohn: x 5 reps  -Tongue tip to roof of mouth, hold with pressure: x 10 reps  -Falsetto 'ee': x 6 reps    - Bolus control exercises: 6/6 exercises were completed, 10 reps each. Recommendations:  Solid: Diet Solids Recommendation: Regular(Avoid tough, chewy, dry, particulate and stringy foods)  Liquid: Liquid Consistency Recommendation: Thin  Medication:Recommended Form of Meds: Meds in puree    Assessment:   The pt continues to demonstrate reduced mastication of solids of increased texture along with intermittent s/s of aspiration with these textures. Plan: ST to continue POC for 2x/week for 6 weeks    Total Treatment Time / Charges     Time in Time out Total Time / units   Cognitive Tx      Speech Tx      Dysphagia Tx 0379 1834 46 min / 1 unit       Signature:    Roxanne Moss, [facility], [address] [place] [address]  [id number]  Speech-Language Pathologist  Phone: [phone]  Speech Desk: [phone]

## 2021-02-11 ENCOUNTER — HOSPITAL ENCOUNTER (OUTPATIENT)
Dept: SPEECH THERAPY | Age: 81
Setting detail: THERAPIES SERIES
Discharge: HOME | End: 2021-02-11
Payer: MEDICARE

## 2021-02-15 ENCOUNTER — HOSPITAL ENCOUNTER (OUTPATIENT)
Dept: SPEECH THERAPY | Age: 81
Setting detail: THERAPIES SERIES
End: 2021-02-15
Payer: MEDICARE

## 2021-02-16 ENCOUNTER — HOSPITAL ENCOUNTER (OUTPATIENT)
Dept: SPEECH THERAPY | Age: 81
Setting detail: THERAPIES SERIES
Discharge: HOME | End: 2021-02-16
Payer: MEDICARE

## 2021-02-16 PROCEDURE — 92526 ORAL FUNCTION THERAPY: CPT

## 2021-02-16 NOTE — FLOWSHEET NOTE
Speech Language Pathology     [facility]. and Therapy, [facility],  [place] [address]  [place], [address]   Phone: [phone]  Fax [phone]      Speech-Language Pathology  Daily Treatment Note    Date:  2021    Patient Name:  Maggi Finney    :  1940  MRN: [id number]  Restrictions/Precautions:  Dysphagia, aspiration  Diagnosis:    Visit Diagnoses        Codes     Oropharyngeal dysphagia    -  Primary R13.12     Idiopathic Parkinson's disease ([facility] [address]     G20     Treatment Diagnosis:  Moderate oropharyngeal dysphagia  Insurance/Certification information:  Primary: Medicare, Secondary: [facility]   Referring Physician:  Azul Rosas NP  Plan of care signed (Y/N):  Y  Visit# / total visits:   Pain level: 0/10     Progress Note: []  Yes  [x]  No  Next due by: 2/10 or 3/4/21    Subjective: The pt was accompanied by her . Both were their typically pleasant selves. The pt continues to report having difficulty chewing and swallowing ground beef and certain meats. Objective:   VitalStim (NMES) was completed. Electrodes were also placed bilaterally on her face over the parotid gland area due to continued report of oral dryness. Up to 2.5 mA of current was used in this area with improved saliva production reported. Electrodes were also placed via 3b placement to improve pharyngeal constriction for 50 minutes, up to 9.0 mA current was used. She tolerated NMES/VitalStim with no pain or reported discomfort. Short-term Goals:  Goal 1: The pt will tolerate regular solids without s/s of aspiration or other form of significant dysphagia 10/10  - Mixed nuts: 6/6 tolerated without s/s of aspiration; Prolonged mastication needing extra time for bolus prep.     Goal 2: The pt will tolerate thin liquids without s/s of aspiration or other form of significant dysphagia 10/10  - GOAL MET    Other Treatments: Several laryngeal / pharyngeal strengthening and TBR exercises were completed this date (completed while VitalStim in place)  -Shiela: x 10 reps  -Tongue tip to roof of mouth, hold, and swallow: x 10 reps (2 sets of 5 reps)  -Mendelsohn: x 5 reps  -Tongue tip to roof of mouth, hold with pressure: x 10 reps  -Falsetto 'ee': x 10 reps    - Bolus control exercises: 6/6 exercises were completed, 10 reps each. - Thin water via cup: 9/10; x 1 instance of coughing after the 4th sip. Recommendations:  Solid: Diet Solids Recommendation: Regular(Avoid tough, chewy, dry, particulate and stringy foods)  Liquid: Liquid Consistency Recommendation: Thin  Medication:Recommended Form of Meds: Meds in puree    Assessment:   The pt demonstrated improved tolerance of regular solids this date. Plan: ST to continue POC for 2x/week for 6 weeks    Total Treatment Time / Charges     Time in Time out Total Time / units   Cognitive Tx      Speech Tx      Dysphagia Tx 1030 1130 60 min / 1 unit       Signature:    Caryle Font, [place], [place]an Shaggy EA#[id number]  Speech-Language Pathologist  Phone: [phone]  Speech Desk: [phone]

## 2021-02-19 ENCOUNTER — HOSPITAL ENCOUNTER (OUTPATIENT)
Dept: SPEECH THERAPY | Age: 81
Setting detail: THERAPIES SERIES
Discharge: HOME | End: 2021-02-19
Payer: MEDICARE

## 2021-02-19 PROCEDURE — 92526 ORAL FUNCTION THERAPY: CPT

## 2021-02-19 NOTE — PROGRESS NOTES
[facility], [facility], [address] [place], [address]   Phone: [phone]  Fax [phone]        Speech Therapy Discharge Note         The following patient has been evaluated for therapy services. Please review the attached summary of the patient's plan of care, and verify that you agree with plan for additional therapy services at this time. Thank you for the referral of this patient. Please sign the attached certification form. Physician signature_______________________ Date________________      Fax to: [facility] [phone]         Date: 2021        Patient Name:  Maggi Finney    :  1940  MRN: [id number]  Restrictions/Precautions:  Dysphagia, aspiration  Diagnosis:         Visit Diagnoses        Codes     Oropharyngeal dysphagia    -  Primary R13.12     Idiopathic Parkinson's disease (HCC)     G20      Treatment Diagnosis:  Moderate oropharyngeal dysphagia  Insurance/Certification information:  Primary: Medicare, Secondary: [facility]   Referring Physician:  Azul Rosas NP  Plan of care signed (Y/N):  Y  Visit# / total visits:   Pain level: 0/10              Time Period for Report:  21-21   Cancels/No-shows to date:  None    Plan of Care/Treatment to date:  [] Speech-Language Evaluation/Treatment    [x] Dysphagia Evaluation/Treatment        [x] Dysphagia Treatment via Neuromuscular Electrical Stimulation (NMES)   [x] Repeat Modified Barium Swallowing Study if indicated in the future  [] Fiberoptic Endoscopic Evaluation of Swallowing (FEES)    [] Cognitive-Linguistic Skills Development  [] Voice evaluation and Treatment      [] Evaluation, modification, and Training of Voice Prosthetic     [] Evaluation for Speech-Generating Augmentative and Alternative Communication Device   [] Therapeutic Services for the use of Speech-Generating Device.    [] Other: Significant Findings At Last Visit/Comments:    Subjective:  · The pt was in good spirits. She denies having any further significant oropharyngeal.     Objective:  ? Observation: The pt still has prolonged mastication with solids of increased texture but with extra time is able to clear the oral cavity. Assessment:  ? Summary: D/C Speech Therapy services 2/2 goals met. The pt has met all of her short-term goals. She is able to tolerate all solids and liquids without s/s of aspiration. Her mastication remains prolonged but functional. D/C Speech Therapy services at this time 2/2 goals met. ? Patient's response to treatment: The pt is very motivated and willing to work hard to improve. Progress towards goals:    Short-term Goals:  Goal 1: The pt will tolerate regular solids without s/s of aspiration or other form of significant dysphagia 10/10  - 10/10   GOAL MET     Goal 2: The pt will tolerate thin liquids without s/s of aspiration or other form of significant dysphagia 10/10  - GOAL MET        Recommended Diet and Intervention  Solid: Diet Solids Recommendation: Regular(Avoid tough, chewy, dry, particulate and stringy foods)  Liquid: Liquid Consistency Recommendation:  Thin  Medication:Recommended Form of Meds: Meds in puree      Current Frequency/Duration:  # Days per week: [] 1 day # Weeks: [] 1 week [x] 4 weeks      [x] 2 days   [] 2 weeks [] 5 weeks      [] 3 days   [] 3 weeks [] 6 weeks     Rehab Potential: [] Excellent [x] Good [] Fair  [] Poor     Goal Status:  [x] Achieved [] Partially Achieved  [] Not Achieved     Patient Status: [] Continue per initial plan of Care     [x] Patient now discharged 2/2 goals met     [] Additional visits requested, Please re-certify for additional visits:          Electronically signed by:  JAZZ Wynn  Phone: [phone]  Fax: [address]  Speech-Language Pathologist  Phone #: [phone]  Fax #: [phone] If you have any questions or concerns, please don't hesitate to call.   Thank you for your referral.

## 2021-02-19 NOTE — PROGRESS NOTES
[facility] - Swallowing      Patient: Caroline Morris  : 1940  MRN: [id number]  Date: 2021  Electronically Signed by: Mamie Peres MA, CCC-SLP       Note: In Levels 3-5, some patients may meet only one of the \"and/or\" criteria listed. If you have difficulty deciding on the most appropriate level for an individual, use dietary level as the most important criterion if the dietary level is the result of swallow function rather than dentition only. Dietary levels at [facility] 6 and 7 should be judged only on swallow function, and any influence of poor dentition should be disregarded. []  Level 1 Individual is not able to swallow anything safely by mouth. All nutrition and hydration is received through non-oral means (e.g., nasogastric tube, PEG)    []  Level 2 Individual is not able to swallow safely by mouth for nutrition and hydration, but may take some consistency with consistent maximal cues in therapy only. Alternative method of feeding required    []  Level 3  Alternative method of feeding required as individual takes less than 50% of nutrition and hydration by mouth, and/or swallowing is safe with consistent use of moderate cues to use compensatory strategies and/or requires maximum diet restriction    []  Level 4 Swallowing is safe, but usually requires moderate cues to use compensatory strategies, and/or the individual has moderate diet restrictions and /or still requires tube feeding and/or oral supplements    []  Level 5 Swallowing is safe with minimal diet restriction and/or occasionally requires minimal cueing to use compensatory strategies. The individual may occasionally self-cue.  All nutrition and hydration needs are met by mouth at mealtime []  Level 6 Swallowing is safe, and the individual eats and drinks independently and may rarely require minimal cueing. The individual usually self-cues when difficulty occurs. May need to avoid specific food items (e.g., popcorn and nuts), or require additional time (due to dysphasia)    [x]  Level 7 The individual's ability to eat independently is not limited by swallow function. Swallowing would be safe and efficient for all consistencies. Compensatory strategies are effectively used when needed       Diet levels/restrictions are defined on next page. Please use levels as a guide in scoring this FCM                                        Swallowing: Dietary Levels/Restrictions  · Maximum Restrictions: Diet is two or more levels below a regular diet status and liquid consistency  · Moderate Restrictions: Diet is two or more levels below a regular diet status in either solid or liquid consistency (but not both), OR diet is one level below in both solid and liquid consistency   · Minimum Restrictions: Diet is one level below a regular diet status in solid or liquid consistency    Solids  · Regular: No restrictions  · Reduced One Level: Meats are cooked until soft, with not tough or stringy foods. Might include meats like meat loaf, baked fish, and soft chicken. Vegetables are cooked soft  · Reduced Two Levels: Meats are chopped or ground. Vegetables are one consistency (e.g., souffle, baked potato) or are mashed with a fork  · Reduced Three Levels: Meats and vegetables are pureed    Liquids  · Regular: Thin liquids;  No restrictions  · Reduced One Level: Nectar, syrup; mildly thick    · Reduced Two Levels: Honey; moderately thick  · Reduced Three Levels: Pudding; extra thick

## 2021-02-19 NOTE — FLOWSHEET NOTE
Speech Language Pathology     [facility]. and Therapy, [facility],  [address], [address]   Phone: [phone]  Fax [phone]      Speech-Language Pathology  Daily Treatment Note    Date:  2021    Patient Name:  Malika Leal    :  1940  MRN: [id number]  Restrictions/Precautions:  Dysphagia, aspiration  Diagnosis:    Visit Diagnoses        Codes     Oropharyngeal dysphagia    -  Primary R13.12     Idiopathic Parkinson's disease ([facility] [address]     G20     Treatment Diagnosis:  Moderate oropharyngeal dysphagia  Insurance/Certification information:  Primary: Medicare, Secondary: [address]   Referring Physician:  Narciso Smyth NP  Plan of care signed (Y/N):  Y  Visit# / total visits:   Pain level: 0/10     Progress Note: [x]  Yes  []  No  Next due by:     Subjective: The pt was     Objective:   VitalStim (NMES) was completed. Electrodes were also placed bilaterally on her face over the parotid gland area due to continued report of oral dryness. Up to 4.0 mA of current was used in this area with improved saliva production reported. Electrodes were also placed via 3b placement to improve pharyngeal constriction for 35 minutes, up to 9.0 mA current was used. She tolerated NMES/VitalStim with no pain or reported discomfort.      Short-term Goals:  Goal 1: The pt will tolerate regular solids without s/s of aspiration or other form of significant dysphagia 10/10  - Hilario Fernandez: 10/10 tolerated without s/s of aspiration; Mastication remains prolonged  GOAL MET    Goal 2: The pt will tolerate thin liquids without s/s of aspiration or other form of significant dysphagia 10/10  - GOAL MET    Other Treatments:  Several laryngeal / pharyngeal strengthening and TBR exercises were completed this date (completed while VitalStim in place)  -Shiela: x 10 reps  -Tongue tip to roof of mouth, hold, and swallow: x 10 reps (2 sets of 5 reps) -Mendelsohn: x 5 reps  -Tongue tip to roof of mouth, hold with pressure: x 10 reps  -Falsetto 'ee': x 10 reps    - Bolus control exercises: Did not target    - Thin water via cup: Tolerated 10/10 sips    Recommendations:  Solid: Diet Solids Recommendation: Regular(Avoid tough, chewy, dry, particulate and stringy foods)  Liquid: Liquid Consistency Recommendation: Thin  Medication:Recommended Form of Meds: Meds in puree    Assessment:   The pt demonstrated improved tolerance of regular solids this date. Plan: ST to continue POC for 2x/week for 6 weeks    Total Treatment Time / Charges     Time in Time out Total Time / units   Cognitive Tx      Speech Tx      Dysphagia Tx 0802 0848 46 min / 1 unit       Signature:    Natalie Bo Milbert Childes  #[id number]  Speech-Language Pathologist  Phone: [phone]  Speech Desk: [phone]

## 2021-02-26 ENCOUNTER — OFFICE VISIT (OUTPATIENT)
Dept: FAMILY MEDICINE CLINIC | Age: 81
End: 2021-02-26
Payer: MEDICARE

## 2021-02-26 VITALS
HEART RATE: 60 BPM | HEIGHT: 65 IN | OXYGEN SATURATION: 95 % | BODY MASS INDEX: 44.95 KG/M2 | TEMPERATURE: 96.8 F | SYSTOLIC BLOOD PRESSURE: 130 MMHG | DIASTOLIC BLOOD PRESSURE: 72 MMHG | WEIGHT: 269.8 LBS

## 2021-02-26 DIAGNOSIS — G40.209 PARTIAL SYMPTOMATIC EPILEPSY WITH COMPLEX PARTIAL SEIZURES, NOT INTRACTABLE, WITHOUT STATUS EPILEPTICUS (HCC): ICD-10-CM

## 2021-02-26 DIAGNOSIS — I48.0 PAROXYSMAL ATRIAL FIBRILLATION (HCC): ICD-10-CM

## 2021-02-26 DIAGNOSIS — I50.32 CHRONIC DIASTOLIC CONGESTIVE HEART FAILURE (HCC): ICD-10-CM

## 2021-02-26 DIAGNOSIS — E66.01 MORBID OBESITY WITH BMI OF 40.0-44.9, ADULT (HCC): ICD-10-CM

## 2021-02-26 DIAGNOSIS — E11.22 TYPE 2 DIABETES MELLITUS WITH STAGE 4 CHRONIC KIDNEY DISEASE, WITHOUT LONG-TERM CURRENT USE OF INSULIN (HCC): Primary | ICD-10-CM

## 2021-02-26 DIAGNOSIS — E03.9 ACQUIRED HYPOTHYROIDISM: Chronic | ICD-10-CM

## 2021-02-26 DIAGNOSIS — G20 PARKINSON DISEASE (HCC): ICD-10-CM

## 2021-02-26 DIAGNOSIS — M26.622 TMJ TENDERNESS, LEFT: ICD-10-CM

## 2021-02-26 DIAGNOSIS — N18.4 CHRONIC KIDNEY DISEASE, STAGE 4 (SEVERE) (HCC): ICD-10-CM

## 2021-02-26 DIAGNOSIS — N18.4 TYPE 2 DIABETES MELLITUS WITH STAGE 4 CHRONIC KIDNEY DISEASE, WITHOUT LONG-TERM CURRENT USE OF INSULIN (HCC): Primary | ICD-10-CM

## 2021-02-26 DIAGNOSIS — E78.5 HYPERLIPIDEMIA, UNSPECIFIED HYPERLIPIDEMIA TYPE: ICD-10-CM

## 2021-02-26 DIAGNOSIS — J44.9 CHRONIC OBSTRUCTIVE PULMONARY DISEASE, UNSPECIFIED COPD TYPE (HCC): ICD-10-CM

## 2021-02-26 DIAGNOSIS — I10 ESSENTIAL HYPERTENSION: Chronic | ICD-10-CM

## 2021-02-26 PROBLEM — N18.30 TYPE 2 DIABETES MELLITUS WITH STAGE 3 CHRONIC KIDNEY DISEASE, WITHOUT LONG-TERM CURRENT USE OF INSULIN (HCC): Status: RESOLVED | Noted: 2017-02-10 | Resolved: 2021-02-26

## 2021-02-26 LAB — HBA1C MFR BLD: 6 %

## 2021-02-26 PROCEDURE — 1123F ACP DISCUSS/DSCN MKR DOCD: CPT | Performed by: PHYSICIAN ASSISTANT

## 2021-02-26 PROCEDURE — G8926 SPIRO NO PERF OR DOC: HCPCS | Performed by: PHYSICIAN ASSISTANT

## 2021-02-26 PROCEDURE — G8417 CALC BMI ABV UP PARAM F/U: HCPCS | Performed by: PHYSICIAN ASSISTANT

## 2021-02-26 PROCEDURE — 99214 OFFICE O/P EST MOD 30 MIN: CPT | Performed by: PHYSICIAN ASSISTANT

## 2021-02-26 PROCEDURE — 1036F TOBACCO NON-USER: CPT | Performed by: PHYSICIAN ASSISTANT

## 2021-02-26 PROCEDURE — 1090F PRES/ABSN URINE INCON ASSESS: CPT | Performed by: PHYSICIAN ASSISTANT

## 2021-02-26 PROCEDURE — 83036 HEMOGLOBIN GLYCOSYLATED A1C: CPT | Performed by: PHYSICIAN ASSISTANT

## 2021-02-26 PROCEDURE — 4040F PNEUMOC VAC/ADMIN/RCVD: CPT | Performed by: PHYSICIAN ASSISTANT

## 2021-02-26 PROCEDURE — G8400 PT W/DXA NO RESULTS DOC: HCPCS | Performed by: PHYSICIAN ASSISTANT

## 2021-02-26 PROCEDURE — 3023F SPIROM DOC REV: CPT | Performed by: PHYSICIAN ASSISTANT

## 2021-02-26 PROCEDURE — G8427 DOCREV CUR MEDS BY ELIG CLIN: HCPCS | Performed by: PHYSICIAN ASSISTANT

## 2021-02-26 PROCEDURE — G8482 FLU IMMUNIZE ORDER/ADMIN: HCPCS | Performed by: PHYSICIAN ASSISTANT

## 2021-02-26 RX ORDER — FERROUS SULFATE 325(65) MG
325 TABLET ORAL 2 TIMES DAILY
COMMUNITY
Start: 2021-02-04 | End: 2021-12-02

## 2021-02-26 RX ORDER — TIZANIDINE 2 MG/1
2 TABLET ORAL 3 TIMES DAILY PRN
Qty: 30 TABLET | Refills: 0 | Status: SHIPPED | OUTPATIENT
Start: 2021-02-26 | End: 2021-04-16

## 2021-02-26 ASSESSMENT — ENCOUNTER SYMPTOMS
NAUSEA: 0
DIARRHEA: 0
RHINORRHEA: 0
CONSTIPATION: 0
ABDOMINAL PAIN: 0
SHORTNESS OF BREATH: 0
COUGH: 0
SORE THROAT: 0
VOMITING: 0

## 2021-02-26 ASSESSMENT — PATIENT HEALTH QUESTIONNAIRE - PHQ9
SUM OF ALL RESPONSES TO PHQ9 QUESTIONS 1 & 2: 1
SUM OF ALL RESPONSES TO PHQ QUESTIONS 1-9: 1
SUM OF ALL RESPONSES TO PHQ QUESTIONS 1-9: 1
2. FEELING DOWN, DEPRESSED OR HOPELESS: 1

## 2021-02-26 NOTE — PROGRESS NOTES
HENT: Negative for congestion, ear pain, rhinorrhea and sore throat. +jaw pain   Eyes: Negative for visual disturbance. Respiratory: Negative for cough and shortness of breath. Cardiovascular: Negative for chest pain and palpitations. Gastrointestinal: Negative for abdominal pain, constipation, diarrhea, nausea and vomiting. Genitourinary: Negative for difficulty urinating and dysuria. Musculoskeletal: Negative for arthralgias and myalgias. Skin: Negative for rash. Neurological: Positive for weakness. Negative for dizziness and numbness. Psychiatric/Behavioral: Negative for sleep disturbance. Allergies, past medical history, family history, and social history reviewed and unchanged from previous encounter. Current Outpatient Medications   Medication Sig Dispense Refill    ferrous sulfate (IRON 325) 325 (65 Fe) MG tablet Take 325 mg by mouth daily      budesonide (PULMICORT) 180 MCG/ACT AEPB inhaler Inhale 2 puffs into the lungs 2 times daily      tiZANidine (ZANAFLEX) 2 MG tablet Take 1 tablet by mouth 3 times daily as needed (jaw pain) 30 tablet 0    atorvastatin (LIPITOR) 40 MG tablet Take 1.5 tablets by mouth daily 135 tablet 3    propafenone (RYTHMOL) 150 MG tablet Take 1 tablet by mouth every 8 hours 270 tablet 3    apixaban (ELIQUIS) 2.5 MG TABS tablet Take 1 tablet by mouth 2 times daily 180 tablet 3    furosemide (LASIX) 40 MG tablet Take 0.5 tablets by mouth daily 60 tablet 5    hydrALAZINE (APRESOLINE) 25 MG tablet Take 1 tablet by mouth twice daily.  180 tablet 1    fluticasone (FLONASE) 50 MCG/ACT nasal spray USE 2 PUFFS IN EACH NOSTRIL DAILY 3 Bottle 10    DULoxetine (CYMBALTA) 60 MG extended release capsule Take 1 capsule by mouth daily 90 capsule 1    montelukast (SINGULAIR) 10 MG tablet Take One Tablet By Mouth Daily 90 tablet 1    metoprolol tartrate (LOPRESSOR) 50 MG tablet Take 1 tablet by mouth 2 times daily 180 tablet 1    carbidopa-levodopa (SINEMET)  MG per tablet Take 1 tablet by mouth 4 times daily      lamoTRIgine (LAMICTAL) 25 MG tablet Take 25 mg by mouth 2 times daily 2 tabs BID      Handicap Placard MISC by Does not apply route DX: G20  parkinson's disease  Exp: 9/1/2024 1 each 0    aspirin EC 81 MG EC tablet Take 1 tablet by mouth daily. Start taking next week. Indications: OTC 30 tablet 2    fluticasone-vilanterol (BREO ELLIPTA) 100-25 MCG/INH AEPB inhaler Inhale 1 puff into the lungs daily 90 each 1    NIFEdipine (ADALAT CC) 60 MG extended release tablet Take 1 tablet by mouth daily 90 tablet 3    albuterol sulfate HFA (VENTOLIN HFA) 108 (90 Base) MCG/ACT inhaler Inhale 2 puffs into the lungs every 6 hours as needed for Wheezing 3 Inhaler 1    Alpha-Lipoic Acid 300 MG TABS Take by mouth daily      ipratropium-albuterol (DUONEB) 0.5-2.5 (3) MG/3ML SOLN nebulizer solution Inhale 3 mLs into the lungs every 6 hours as needed for Shortness of Breath DX: J45.40 360 mL 1    Multiple Vitamins-Minerals (THERAPEUTIC MULTIVITAMIN-MINERALS) tablet Take 1 tablet by mouth daily       No current facility-administered medications for this visit. Vitals:    02/26/21 0808   BP: 130/72   Site: Right Lower Arm   Position: Sitting   Cuff Size: Large Adult   Pulse: 60   Temp: 96.8 °F (36 °C)   TempSrc: Temporal   SpO2: 95%  Comment: ra   Weight: 269 lb 12.8 oz (122.4 kg)   Height: 5' 4.57\" (1.64 m)     Estimated body mass index is 45.5 kg/m² as calculated from the following:    Height as of this encounter: 5' 4.57\" (1.64 m). Weight as of this encounter: 269 lb 12.8 oz (122.4 kg). Physical Exam  Constitutional:       General: She is not in acute distress. Appearance: She is well-developed. HENT:      Head: Normocephalic and atraumatic. Jaw: Tenderness and pain on movement present.       Comments: Grinding felt with palpation    Eyes:      Conjunctiva/sclera: Conjunctivae normal.      Pupils: Pupils are equal, round, and reactive to light. Neck:      Musculoskeletal: Neck supple. Cardiovascular:      Rate and Rhythm: Normal rate and regular rhythm. Heart sounds: Normal heart sounds. No murmur. Pulmonary:      Effort: Pulmonary effort is normal.      Breath sounds: Normal breath sounds. No wheezing. Abdominal:      General: Bowel sounds are normal.      Palpations: Abdomen is soft. Tenderness: There is no abdominal tenderness. Lymphadenopathy:      Cervical: No cervical adenopathy. Skin:     General: Skin is warm and dry. Findings: No rash. Neurological:      Mental Status: She is alert and oriented to person, place, and time. Motor: Weakness present. ASSESSMENT and PLAN:  Arlean Gaucher was seen today for diabetes. Diagnoses and all orders for this visit:    Type 2 diabetes mellitus with stage 4 chronic kidney disease, without long-term current use of insulin (HCC)  -     POCT glycosylated hemoglobin (Hb A1C)6.0  - diet controlled, continue to watch carbohydrate intake     Acquired hypothyroidism  -     TSH with Reflex; Future    Chronic kidney disease, stage 4 (severe) (HCC)  - follows with nephrology   - continues to avoid NSAIDS and other nephrotoxic medications, limited on hydration 2/2 to hf fluid restrictions    Chronic obstructive pulmonary disease, unspecified COPD type ([facility] [address]  - stable, switching inhalers due to formulary change. Patient to notify with worsening sxs once on the new medication. Essential hypertension  Chronic diastolic congestive heart failure (HCC)  Paroxysmal atrial fibrillation (HCC)  - Stable on current medication regimen, continue  - weight up slightly, no changes in respiratory status, or O2 level. Pt to continue to monitor and watch diet. Parkinson disease ([address]  - well controlled on sinemet, cont neuro follow up.   - fall risk- utilizes walker and wheelchair when out of the house.      Morbid obesity with BMI of 40.0-44.9, adult ([address]  -     LIPID PANEL; Future    Partial symptomatic epilepsy with complex partial seizures, not intractable, without status epilepticus ([facility] [address]  - stable on lamictal.     Hyperlipidemia, unspecified hyperlipidemia type  -     HEPATIC FUNCTION PANEL; Future    TMJ tenderness, left  -     tiZANidine (ZANAFLEX) 2 MG tablet; Take 1 tablet by mouth 3 times daily as needed (jaw pain)  Specialist notes and imaging reviewed: cardiology, pulmonology, neurology, nephrology. Due for fasting labs, orders placed. Return in about 6 months (around 8/26/2021) for Hypothyroid.

## 2021-03-01 DIAGNOSIS — E78.5 HYPERLIPIDEMIA, UNSPECIFIED HYPERLIPIDEMIA TYPE: ICD-10-CM

## 2021-03-01 DIAGNOSIS — E03.9 ACQUIRED HYPOTHYROIDISM: Chronic | ICD-10-CM

## 2021-03-01 DIAGNOSIS — E66.01 MORBID OBESITY WITH BMI OF 40.0-44.9, ADULT (HCC): ICD-10-CM

## 2021-03-01 LAB
ALBUMIN SERPL-MCNC: 4.2 G/DL (ref 3.4–5)
ALP BLD-CCNC: 130 U/L (ref 40–129)
ALT SERPL-CCNC: 8 U/L (ref 10–40)
AST SERPL-CCNC: 19 U/L (ref 15–37)
BILIRUB SERPL-MCNC: 0.4 MG/DL (ref 0–1)
BILIRUBIN DIRECT: <0.2 MG/DL (ref 0–0.3)
BILIRUBIN, INDIRECT: ABNORMAL MG/DL (ref 0–1)
CHOLESTEROL, TOTAL: 143 MG/DL (ref 0–199)
HDLC SERPL-MCNC: 53 MG/DL (ref 40–60)
LDL CHOLESTEROL CALCULATED: 65 MG/DL
TOTAL PROTEIN: 7.1 G/DL (ref 6.4–8.2)
TRIGL SERPL-MCNC: 123 MG/DL (ref 0–150)
TSH REFLEX: 2.59 UIU/ML (ref 0.27–4.2)
VLDLC SERPL CALC-MCNC: 25 MG/DL

## 2021-03-05 NOTE — PROGRESS NOTES
[facility] Office Note  3/8/2021     Follow-up, Coronary Artery Disease, Congestive Heart Failure, Hyperlipidemia, Hypertension, and Atrial Fibrillation      Subjective: Fadumo Harmon is here for follow up for PAF, dCHF, HTN, HLD. [place]:    Today she states she has been feeling well overall. Her  states she has dizziness at times after she stands from commode  using the bathroom. She uses a wheelchair most of the time and does not have any dizziness standing from her wheelchair. She has received her COVID vaccine and tolerating it well. Her blood pressure is checked at home and it ranges from 150-160/60-80's, heart rate in the 60's. She states she is not very active due to being a fall risk. She has weakness in her legs that she thinks is related to inactivity but also her Parkinson's. She has leg weakness after  walking a short distance. She has some pain in her right hand from arthritis. Patient currently denies any weight gain, edema, palpitations, chest pain, shortness of breath, and syncope. PMH:  She has a past medical history of PAF, dCHF, HTN, HLD as well as DM, BETSY on CPAP, CVD s/p R-CEA, CKD3, Parkinsons and COPD. Her most recent stress test on 4/25/17 showed no evidence of  myocardial ischemia or scar. Carotid dopplers 3/16/17 showed moderate plaque. An echocardiogram from 9/27/18 demonstrates an EF of 51-63%, grade I diastolic dysfunction with normal filling pressure, mild to moderate mitral regurgitation, systolic pulmonic artery pressure (SPAP) is estimated at 51 mmHg consistentwith mild pulmonary hypertension (Right atrial pressure of 3 mmHg). She uses BIPAP nightly with 2 liter of O2. Most recent echocardiogram from 7/9/2020 showed an EF of 55%.      Review of Systems:         12 point ROS negative in all areas as listed below except as in [place]  Constitutional, EENT,pulmonary, GI, , skin, neurological, hematological, endocrine, Psychiatric    Reviewed past medical history, social, and family history. Smoking none  Alcohol none  Family history- mother  at age 80 of CHF. Father had heart issues and  at age 80,   5 years ago.      Past Medical History:   Diagnosis Date    Anemia, unspecified     neg bone marrow    Asthma     Atrial fibrillation ([address]     Baker's cyst     Basal cell carcinoma of left cheek 3/12/2019    Blind left eye     Carotid artery stenoses     Carotid stenosis 2002    left    Cervical spinal cord compression ([address] 2010    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-sided sciatica 2016    CKD (chronic kidney disease) stage 3, GFR 30-59 ml/min     Community acquired pneumonia of left lower lobe of lung 2018    CRI     Detached retina, left     Diverticulosis     DJD (degenerative joint disease)     Edema     chronic    Fatty liver     GERD (gastroesophageal reflux disease)     Herniated lumbar intervertebral disc     Hx stage 2 sacral decubitus ulcer 2018    Hyperlipidemia     Hypertension     Hypothyroid 2015    Morbid obesity with BMI of 40.0-44.9, adult ([address] 2/10/2017    BETSY treated with BiPAP     Parkinson's disease ([address]     Partial symptomatic epilepsy with complex partial seizures, not intractable, without status epilepticus ([address] 2020    Restless leg syndrome     Restless legs syndrome     Rheumatic fever 1950    2x IN [place]    Sleep apnea     Tremor, essential 2010    Type 2 diabetes mellitus without complication ([address]     Type II or unspecified type diabetes mellitus without mention of complication, not stated as uncontrolled      Past Surgical History:   Procedure Laterality Date    APPENDECTOMY      CAROTID ENDARTERECTOMY      left    CATARACT REMOVAL      CERVICAL FUSION  2010    CHOLECYSTECTOMY      COLONOSCOPY N/A 3/8/2020    COLONOSCOPY FLEXIBLE W/ DECOMPRESSION performed by Joel Chan MD at [address] SURGERY      HERNIA REPAIR      HYSTERECTOMY      JOINT REPLACEMENT Bilateral 2003    KNEE SURGERY      replacement x 2    SHOULDER SURGERY      TONSILLECTOMY      TUBAL LIGATION      UPPER GASTROINTESTINAL ENDOSCOPY  03/17/2020    Esophagitis    UPPER GASTROINTESTINAL ENDOSCOPY N/A 3/17/2020    EGD performed by Jose David Bermudez MD at [address] [facility]         Objective:   /68   Pulse 60   Ht 5' 4\" (1.626 m)   Wt 242 lb (109.8 kg)   SpO2 95%   BMI 41.54 kg/m²     Wt Readings from Last 3 Encounters:   03/08/21 242 lb (109.8 kg)   02/26/21 269 lb 12.8 oz (122.4 kg)   06/16/20 255 lb (115.7 kg)       Physical Exam:  General: No Respiratory distress, appears well developed and well nourished. Eyes:  Sclera nonicteric  Nose/Sinuses:  negative findings: nose shows no deformity, asymmetry, or inflammation, nasal mucosa normal, septum midline with no perforation or bleeding  Back:  no pain to palpation  Joint:  no active joint inflammation  Musculoskeletal:  negative  Skin:  Warm and dry  Neck:  Negative for JVD and Carotid Bruits. Chest:  Clear to auscultation, respiration easy  Cardiovascular:  RRR, S1S2 normal, no murmur, no rub or thrill. Extremities:  Nonpitting  edema,  No clubbing, cyanosis,  Pulses: not palpable in feet.   Neuro: intact    Medications:   Outpatient Encounter Medications as of 3/8/2021   Medication Sig Dispense Refill    losartan (COZAAR) 25 MG tablet Take 25 mg by mouth daily      ferrous sulfate (IRON 325) 325 (65 Fe) MG tablet Take 325 mg by mouth daily      budesonide (PULMICORT) 180 MCG/ACT AEPB inhaler Inhale 2 puffs into the lungs 2 times daily      tiZANidine (ZANAFLEX) 2 MG tablet Take 1 tablet by mouth 3 times daily as needed (jaw pain) 30 tablet 0    atorvastatin (LIPITOR) 40 MG tablet Take 1.5 tablets by mouth daily 135 tablet 3    fluticasone-vilanterol (BREO ELLIPTA) 100-25 MCG/INH AEPB inhaler Inhale 1 puff into the lungs daily 90 each 1    NIFEdipine (ADALAT CC) 60 MG extended release tablet Take 1 tablet by mouth daily 90 tablet 3    propafenone (RYTHMOL) 150 MG tablet Take 1 tablet by mouth every 8 hours 270 tablet 3    apixaban (ELIQUIS) 2.5 MG TABS tablet Take 1 tablet by mouth 2 times daily 180 tablet 3    furosemide (LASIX) 40 MG tablet Take 0.5 tablets by mouth daily 60 tablet 5    albuterol sulfate HFA (VENTOLIN HFA) 108 (90 Base) MCG/ACT inhaler Inhale 2 puffs into the lungs every 6 hours as needed for Wheezing 3 Inhaler 1    fluticasone (FLONASE) 50 MCG/ACT nasal spray USE 2 PUFFS IN EACH NOSTRIL DAILY 3 Bottle 10    DULoxetine (CYMBALTA) 60 MG extended release capsule Take 1 capsule by mouth daily 90 capsule 1    montelukast (SINGULAIR) 10 MG tablet Take One Tablet By Mouth Daily 90 tablet 1    metoprolol tartrate (LOPRESSOR) 50 MG tablet Take 1 tablet by mouth 2 times daily 180 tablet 1    carbidopa-levodopa (SINEMET)  MG per tablet Take 1 tablet by mouth 4 times daily      Alpha-Lipoic Acid 300 MG TABS Take by mouth daily      lamoTRIgine (LAMICTAL) 25 MG tablet Take 25 mg by mouth 2 times daily 2 tabs BID      Handicap Placard MISC by Does not apply route DX: G20  parkinson's disease  Exp: 9/1/2024 1 each 0    ipratropium-albuterol (DUONEB) 0.5-2.5 (3) MG/3ML SOLN nebulizer solution Inhale 3 mLs into the lungs every 6 hours as needed for Shortness of Breath DX: J45.40 360 mL 1    Multiple Vitamins-Minerals (THERAPEUTIC MULTIVITAMIN-MINERALS) tablet Take 1 tablet by mouth daily      aspirin EC 81 MG EC tablet Take 1 tablet by mouth daily. Start taking next week. Indications: OTC 30 tablet 2    [DISCONTINUED] hydrALAZINE (APRESOLINE) 25 MG tablet Take 1 tablet by mouth twice daily. 180 tablet 1    [DISCONTINUED] solifenacin (VESICARE) 5 MG tablet Take 1 tablet by mouth daily. 30 tablet 3     No facility-administered encounter medications on file as of 3/8/2021.          Lab Data:  LIPID:   Lab Results Component Value Date    CHOL 143 03/01/2021    CHOL 153 02/04/2019    CHOL 186 07/03/2017     Lab Results   Component Value Date    TRIG 123 03/01/2021    TRIG 106 02/04/2019    TRIG 121 07/03/2017     Lab Results   Component Value Date    HDL 53 03/01/2021    HDL 54 01/24/2020    HDL 66 (H) 02/04/2019     Lab Results   Component Value Date    LDLCALC 65 03/01/2021    LDLCALC 65 01/24/2020    LDLCALC 66 02/04/2019     Lab Results   Component Value Date    LABVLDL 25 03/01/2021    LABVLDL 41 01/24/2020    LABVLDL 21 02/04/2019     No results found for: CHOLHDLRATIO  PT/INR: No results for input(s): PROTIME, INR in the last 72 hours. A1C:   Lab Results   Component Value Date    LABA1C 6.0 02/26/2021     BNP:  No results for input(s): BNP in the last 72 hours. IMAGING:   I have reviewed the following tests and documented in this encounter as follows:   Discussed with patient  Most recent labs in epic reviewed and discussed with the patient and spouse . Echocardiogram 7/9/2020  Summary   Left ventricular systolic function is normal with a visually estimated   ejection fraction of 55%. The left ventricle is normal in size with mild concentric hypertrophy. No regional wall motion abnormalities are noted. Normal left ventricular diastolic function. Systolic pulmonary artery pressure (SPAP) is normal and estimated at 23 mmHg   (right atrial pressure 3 mmHg). Echo doppler 9.27.18   Summary   Normal left ventricular systolic function with ejection fraction of 55-60%. No regional wall motion abnormalites are seen. Mild concentric left ventricular hypertrophy. Grade I diastolic dysfunction with normal filling pressure. Mild to moderate mitral regurgitation. The left atrium is mildly dilated. Moderate tricuspid regurgitation. Systolic pulmonic artery pressure (SPAP) is estimated at 51 mmHg consistent   with mild pulmonary hypertension (Right atrial pressure of 3 mmHg).    Compared to previous study from 3- no changes noted. Stress Test Derrel Gisela) 4/25/17  Summary  There is normal isotope uptake at stress and rest. There is no evidence of  myocardial ischemia or scar. Hyperdynamic LV systolic function with AH>04%  with uniform wall motion. Low risk study. Carotid dopplers 3/16/17  Summary    1. There is moderate plaque seen in the right internal carotid artery with  an estimated diameter reduction of approaching 50%, this could be  underestimated due to calcific shadowing. 2. There is moderate plaque seen in the left internal carotid artery with an  estimated diameter reduction of approaching 50%. 3. The vertebral arteries are patent with antegrade flow bilaterally. 4. Compared to previous exam on 11/25/2015, criteria have been changed to  suggest approaching 50% stenosis in the bilateral internal carotid artery. There is no significant change from previous exam       Echo 3/17/2017:  No change from echo done 3/4/2014. Left ventricular systolic function is normal with the ejection fraction   estimated at 55%. No regional wall motion abnormalities. Grade II diastolic dysfunction with elevated filling pressure. Mild concentric left ventricular hypertrophy with a sigmoid septum. No   outflow obstruction. Left ventricle size is normal.   Mild mitral annular calcification. Mitral valve leaflets are structurally normal.   Calcification in the chordae of the mitral valve. Mild mitral valve regurgitation. Aortic valve leaflets are mildly thickened with normal opening. No aortic valve regurgitation. Echocardiogram 9/27/18  Summary   Normal left ventricular systolic function with ejection fraction of 55-60%. No regional wall motion abnormalites are seen. Mild concentric left ventricular hypertrophy. Grade I diastolic dysfunction with normal filling pressure. Mild to moderate mitral regurgitation. The left atrium is mildly dilated.    Moderate tricuspid regurgitation. Systolic pulmonic artery pressure (SPAP) is estimated at 51 mmHg consistent   with mild pulmonary hypertension (Right atrial pressure of 3 mmHg). Compared to previous study from 3- no changes noted. Assessment:  Encounter Diagnoses   Name Primary?  Essential hypertension     Hyperlipidemia, unspecified hyperlipidemia type     Coronary artery disease involving native coronary artery of native heart without angina pectoris     Paroxysmal atrial fibrillation (HCC) Yes    Chronic diastolic congestive heart failure ([address]          1. Paroxysmal atrial fibrillation currently appears to be sinus. 2. Chronic diastolic failure well compensated  3. Mild to moderate mitral regurgitation. no symptoms or heart failure. 4. Mild pulmonary hypertension   5. Hyperlipidemia   -3/1/2021 , HDL 53, LDL 65,   6. Hypertension   7. Diabetes mellitis  8. Carotid artery disease   9. Obesity   10. Sleep apnea    -on BIPAP   11. Dizziness on standing probably orthostatic hypotension  12. Chronic anemia due to CKD  13. CKD 3       Plan:  1. Recommend increasing Losartan to 50 mg daily and stopping hydralazine try to manage polypharmacy. Monitor BP at home if any change call me to discuss. 2. Continue other medications   3. Healthy lifestyle education reviewed including nutrition, exercise and activity  4. Follow up with me in 9 months       QUALITY MEASURES  1. Tobacco Cessation Counseling: NA  2. Retake of BP if >140/90:   NA  3. Documentation to PCP/referring for new patient:  Sent to PCP at close of office visit  4. CAD patient on anti-platelet: Yes  5. CAD patient on STATIN therapy:  Yes  6. Patient with CHF and aFib on anticoagulation:  Yes       This note was scribed in the presence of Marshall Grey MD by Randall Jin RN  I, Dr. Rocio Montoya, personally performed the services described in this documentation, as scribed by the above signed scribe in my presence.  It is both accurate and complete to my knowledge. I agree with the details independently gathered by the clinical support staff, while the remaining scribed note accurately describes my personal service to the patient.       Arin Schultz M.D

## 2021-03-08 ENCOUNTER — OFFICE VISIT (OUTPATIENT)
Dept: CARDIOLOGY CLINIC | Age: 81
End: 2021-03-08
Payer: MEDICARE

## 2021-03-08 VITALS
WEIGHT: 242 LBS | OXYGEN SATURATION: 95 % | HEART RATE: 60 BPM | HEIGHT: 64 IN | SYSTOLIC BLOOD PRESSURE: 114 MMHG | BODY MASS INDEX: 41.32 KG/M2 | DIASTOLIC BLOOD PRESSURE: 68 MMHG

## 2021-03-08 DIAGNOSIS — I25.10 CORONARY ARTERY DISEASE INVOLVING NATIVE CORONARY ARTERY OF NATIVE HEART WITHOUT ANGINA PECTORIS: Chronic | ICD-10-CM

## 2021-03-08 DIAGNOSIS — I50.32 CHRONIC DIASTOLIC CONGESTIVE HEART FAILURE (HCC): ICD-10-CM

## 2021-03-08 DIAGNOSIS — I10 ESSENTIAL HYPERTENSION: Chronic | ICD-10-CM

## 2021-03-08 DIAGNOSIS — I48.0 PAROXYSMAL ATRIAL FIBRILLATION (HCC): Primary | ICD-10-CM

## 2021-03-08 DIAGNOSIS — E78.5 HYPERLIPIDEMIA, UNSPECIFIED HYPERLIPIDEMIA TYPE: Chronic | ICD-10-CM

## 2021-03-08 PROCEDURE — 4040F PNEUMOC VAC/ADMIN/RCVD: CPT | Performed by: INTERNAL MEDICINE

## 2021-03-08 PROCEDURE — G8482 FLU IMMUNIZE ORDER/ADMIN: HCPCS | Performed by: INTERNAL MEDICINE

## 2021-03-08 PROCEDURE — G8427 DOCREV CUR MEDS BY ELIG CLIN: HCPCS | Performed by: INTERNAL MEDICINE

## 2021-03-08 PROCEDURE — 1036F TOBACCO NON-USER: CPT | Performed by: INTERNAL MEDICINE

## 2021-03-08 PROCEDURE — 1123F ACP DISCUSS/DSCN MKR DOCD: CPT | Performed by: INTERNAL MEDICINE

## 2021-03-08 PROCEDURE — G8400 PT W/DXA NO RESULTS DOC: HCPCS | Performed by: INTERNAL MEDICINE

## 2021-03-08 PROCEDURE — 99214 OFFICE O/P EST MOD 30 MIN: CPT | Performed by: INTERNAL MEDICINE

## 2021-03-08 PROCEDURE — G8417 CALC BMI ABV UP PARAM F/U: HCPCS | Performed by: INTERNAL MEDICINE

## 2021-03-08 PROCEDURE — 1090F PRES/ABSN URINE INCON ASSESS: CPT | Performed by: INTERNAL MEDICINE

## 2021-03-08 RX ORDER — LOSARTAN POTASSIUM 25 MG/1
25 TABLET ORAL DAILY
COMMUNITY
End: 2021-03-08 | Stop reason: SDUPTHER

## 2021-03-08 RX ORDER — LOSARTAN POTASSIUM 50 MG/1
50 TABLET ORAL DAILY
Qty: 90 TABLET | Refills: 3 | Status: SHIPPED | OUTPATIENT
Start: 2021-03-08 | End: 2021-09-01

## 2021-03-08 NOTE — PATIENT INSTRUCTIONS
Plan:  1. Recommend increasing Losartan to 50 mg daily and stopping hydralazine   2. Continue other medications   3. Healthy lifestyle education reviewed including nutrition, exercise and activity  4.  Follow up with me in 9 months

## 2021-03-08 NOTE — LETTER
[facility] Office Note  3/8/2021     Follow-up, Coronary Artery Disease, Congestive Heart Failure, Hyperlipidemia, Hypertension, and Atrial Fibrillation      Subjective: Caroline Morris is here for follow up for PAF, dCHF, HTN, HLD. [place]:    Today she states she has been feeling well overall. Her  states she has dizziness at times after she stands from commode  using the bathroom. She uses a wheelchair most of the time and does not have any dizziness standing from her wheelchair. She has received her COVID vaccine and tolerating it well. Her blood pressure is checked at home and it ranges from 150-160/60-80's, heart rate in the 60's. She states she is not very active due to being a fall risk. She has weakness in her legs that she thinks is related to inactivity but also her Parkinson's. She has leg weakness after  walking a short distance. She has some pain in her right hand from arthritis. Patient currently denies any weight gain, edema, palpitations, chest pain, shortness of breath, and syncope. PMH:  She has a past medical history of PAF, dCHF, HTN, HLD as well as DM, BETSY on CPAP, CVD s/p R-CEA, CKD3, Parkinsons and COPD. Her most recent stress test on 4/25/17 showed no evidence of  myocardial ischemia or scar. Carotid dopplers 3/16/17 showed moderate plaque. An echocardiogram from 9/27/18 demonstrates an EF of 02-40%, grade I diastolic dysfunction with normal filling pressure, mild to moderate mitral regurgitation, systolic pulmonic artery pressure (SPAP) is estimated at 51 mmHg consistentwith mild pulmonary hypertension (Right atrial pressure of 3 mmHg). She uses BIPAP nightly with 2 liter of O2. Most recent echocardiogram from 7/9/2020 showed an EF of 55%.      Review of Systems:         12 point ROS negative in all areas as listed below except as in [place]  Constitutional, EENT,pulmonary, GI, , skin, neurological, hematological, endocrine, Psychiatric    Reviewed past medical history, social, and family history. Smoking none  Alcohol none  Family history- mother  at age 80 of CHF. Father had heart issues and  at age 80,   5 years ago.      Past Medical History:   Diagnosis Date    Anemia, unspecified     neg bone marrow    Asthma     Atrial fibrillation ([address]     Baker's cyst     Basal cell carcinoma of left cheek 3/12/2019    Blind left eye     Carotid artery stenoses     Carotid stenosis 2002    left    Cervical spinal cord compression ([address] 2010    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-sided sciatica 2016    CKD (chronic kidney disease) stage 3, GFR 30-59 ml/min     Community acquired pneumonia of left lower lobe of lung 2018    CRI     Detached retina, left     Diverticulosis     DJD (degenerative joint disease)     Edema     chronic    Fatty liver     GERD (gastroesophageal reflux disease)     Herniated lumbar intervertebral disc     Hx stage 2 sacral decubitus ulcer 2018    Hyperlipidemia     Hypertension     Hypothyroid 2015    Morbid obesity with BMI of 40.0-44.9, adult ([address] 2/10/2017    BETSY treated with BiPAP     Parkinson's disease ([address]     Partial symptomatic epilepsy with complex partial seizures, not intractable, without status epilepticus ([address] 2020    Restless leg syndrome     Restless legs syndrome     Rheumatic fever 1950    2x IN [place]    Sleep apnea     Tremor, essential 2010    Type 2 diabetes mellitus without complication ([address]     Type II or unspecified type diabetes mellitus without mention of complication, not stated as uncontrolled      Past Surgical History:   Procedure Laterality Date    APPENDECTOMY      CAROTID ENDARTERECTOMY      left    CATARACT REMOVAL      CERVICAL FUSION  2010    CHOLECYSTECTOMY      COLONOSCOPY N/A 3/8/2020    COLONOSCOPY FLEXIBLE W/ DECOMPRESSION performed by Yray Bledsoe MD at [address] [facility]      HERNIA REPAIR      HYSTERECTOMY      JOINT REPLACEMENT Bilateral 2003    KNEE SURGERY      replacement x 2    SHOULDER SURGERY      TONSILLECTOMY      TUBAL LIGATION      UPPER GASTROINTESTINAL ENDOSCOPY  03/17/2020    Esophagitis    UPPER GASTROINTESTINAL ENDOSCOPY N/A 3/17/2020    EGD performed by Jose Carlos Smith MD at [address]         Objective:   /68   Pulse 60   Ht 5' 4\" (1.626 m)   Wt 242 lb (109.8 kg)   SpO2 95%   BMI 41.54 kg/m²     Wt Readings from Last 3 Encounters:   03/08/21 242 lb (109.8 kg)   02/26/21 269 lb 12.8 oz (122.4 kg)   06/16/20 255 lb (115.7 kg)       Physical Exam:  General: No Respiratory distress, appears well developed and well nourished. Eyes:  Sclera nonicteric  Nose/Sinuses:  negative findings: nose shows no deformity, asymmetry, or inflammation, nasal mucosa normal, septum midline with no perforation or bleeding  Back:  no pain to palpation  Joint:  no active joint inflammation  Musculoskeletal:  negative  Skin:  Warm and dry  Neck:  Negative for JVD and Carotid Bruits. Chest:  Clear to auscultation, respiration easy  Cardiovascular:  RRR, S1S2 normal, no murmur, no rub or thrill. Extremities:  Nonpitting  edema,  No clubbing, cyanosis,  Pulses: not palpable in feet.   Neuro: intact    Medications:   Outpatient Encounter Medications as of 3/8/2021   Medication Sig Dispense Refill    losartan (COZAAR) 25 MG tablet Take 25 mg by mouth daily      ferrous sulfate (IRON 325) 325 (65 Fe) MG tablet Take 325 mg by mouth daily      budesonide (PULMICORT) 180 MCG/ACT AEPB inhaler Inhale 2 puffs into the lungs 2 times daily      tiZANidine (ZANAFLEX) 2 MG tablet Take 1 tablet by mouth 3 times daily as needed (jaw pain) 30 tablet 0    atorvastatin (LIPITOR) 40 MG tablet Take 1.5 tablets by mouth daily 135 tablet 3    fluticasone-vilanterol (BREO ELLIPTA) 100-25 MCG/INH AEPB inhaler Inhale 1 puff into the lungs daily 90 each 1    NIFEdipine (ADALAT CC) 60 MG extended release tablet Take 1 tablet by mouth daily 90 tablet 3    propafenone (RYTHMOL) 150 MG tablet Take 1 tablet by mouth every 8 hours 270 tablet 3    apixaban (ELIQUIS) 2.5 MG TABS tablet Take 1 tablet by mouth 2 times daily 180 tablet 3    furosemide (LASIX) 40 MG tablet Take 0.5 tablets by mouth daily 60 tablet 5    albuterol sulfate HFA (VENTOLIN HFA) 108 (90 Base) MCG/ACT inhaler Inhale 2 puffs into the lungs every 6 hours as needed for Wheezing 3 Inhaler 1    fluticasone (FLONASE) 50 MCG/ACT nasal spray USE 2 PUFFS IN EACH NOSTRIL DAILY 3 Bottle 10    DULoxetine (CYMBALTA) 60 MG extended release capsule Take 1 capsule by mouth daily 90 capsule 1    montelukast (SINGULAIR) 10 MG tablet Take One Tablet By Mouth Daily 90 tablet 1    metoprolol tartrate (LOPRESSOR) 50 MG tablet Take 1 tablet by mouth 2 times daily 180 tablet 1    carbidopa-levodopa (SINEMET)  MG per tablet Take 1 tablet by mouth 4 times daily      Alpha-Lipoic Acid 300 MG TABS Take by mouth daily      lamoTRIgine (LAMICTAL) 25 MG tablet Take 25 mg by mouth 2 times daily 2 tabs BID      Handicap Placard MISC by Does not apply route DX: G20  parkinson's disease  Exp: 9/1/2024 1 each 0    ipratropium-albuterol (DUONEB) 0.5-2.5 (3) MG/3ML SOLN nebulizer solution Inhale 3 mLs into the lungs every 6 hours as needed for Shortness of Breath DX: J45.40 360 mL 1    Multiple Vitamins-Minerals (THERAPEUTIC MULTIVITAMIN-MINERALS) tablet Take 1 tablet by mouth daily      aspirin EC 81 MG EC tablet Take 1 tablet by mouth daily. Start taking next week. Indications: OTC 30 tablet 2    [DISCONTINUED] hydrALAZINE (APRESOLINE) 25 MG tablet Take 1 tablet by mouth twice daily. 180 tablet 1    [DISCONTINUED] solifenacin (VESICARE) 5 MG tablet Take 1 tablet by mouth daily. 30 tablet 3     No facility-administered encounter medications on file as of 3/8/2021.          Lab Data:  LIPID:   Lab Results Component Value Date    CHOL 143 03/01/2021    CHOL 153 02/04/2019    CHOL 186 07/03/2017     Lab Results   Component Value Date    TRIG 123 03/01/2021    TRIG 106 02/04/2019    TRIG 121 07/03/2017     Lab Results   Component Value Date    HDL 53 03/01/2021    HDL 54 01/24/2020    HDL 66 (H) 02/04/2019     Lab Results   Component Value Date    LDLCALC 65 03/01/2021    LDLCALC 65 01/24/2020    LDLCALC 66 02/04/2019     Lab Results   Component Value Date    LABVLDL 25 03/01/2021    LABVLDL 41 01/24/2020    LABVLDL 21 02/04/2019     No results found for: CHOLHDLRATIO  PT/INR: No results for input(s): PROTIME, INR in the last 72 hours. A1C:   Lab Results   Component Value Date    LABA1C 6.0 02/26/2021     BNP:  No results for input(s): BNP in the last 72 hours. IMAGING:   I have reviewed the following tests and documented in this encounter as follows:   Discussed with patient  Most recent labs in epic reviewed and discussed with the patient and spouse . Echocardiogram 7/9/2020  Summary   Left ventricular systolic function is normal with a visually estimated   ejection fraction of 55%. The left ventricle is normal in size with mild concentric hypertrophy. No regional wall motion abnormalities are noted. Normal left ventricular diastolic function. Systolic pulmonary artery pressure (SPAP) is normal and estimated at 23 mmHg   (right atrial pressure 3 mmHg). Echo doppler 9.27.18   Summary   Normal left ventricular systolic function with ejection fraction of 55-60%. No regional wall motion abnormalites are seen. Mild concentric left ventricular hypertrophy. Grade I diastolic dysfunction with normal filling pressure. Mild to moderate mitral regurgitation. The left atrium is mildly dilated. Moderate tricuspid regurgitation. Systolic pulmonic artery pressure (SPAP) is estimated at 51 mmHg consistent   with mild pulmonary hypertension (Right atrial pressure of 3 mmHg).    Compared to previous study from 3- no changes noted. Stress Test [place]) 4/25/17  Summary  There is normal isotope uptake at stress and rest. There is no evidence of  myocardial ischemia or scar. Hyperdynamic LV systolic function with ZH>71%  with uniform wall motion. Low risk study. Carotid dopplers 3/16/17  Summary    1. There is moderate plaque seen in the right internal carotid artery with  an estimated diameter reduction of approaching 50%, this could be  underestimated due to calcific shadowing. 2. There is moderate plaque seen in the left internal carotid artery with an  estimated diameter reduction of approaching 50%. 3. The vertebral arteries are patent with antegrade flow bilaterally. 4. Compared to previous exam on 11/25/2015, criteria have been changed to  suggest approaching 50% stenosis in the bilateral internal carotid artery. There is no significant change from previous exam       Echo 3/17/2017:  No change from echo done 3/4/2014. Left ventricular systolic function is normal with the ejection fraction   estimated at 55%. No regional wall motion abnormalities. Grade II diastolic dysfunction with elevated filling pressure. Mild concentric left ventricular hypertrophy with a sigmoid septum. No   outflow obstruction. Left ventricle size is normal.   Mild mitral annular calcification. Mitral valve leaflets are structurally normal.   Calcification in the chordae of the mitral valve. Mild mitral valve regurgitation. Aortic valve leaflets are mildly thickened with normal opening. No aortic valve regurgitation. Echocardiogram 9/27/18  Summary   Normal left ventricular systolic function with ejection fraction of 55-60%. No regional wall motion abnormalites are seen. Mild concentric left ventricular hypertrophy. Grade I diastolic dysfunction with normal filling pressure. Mild to moderate mitral regurgitation. The left atrium is mildly dilated.    Moderate tricuspid regurgitation. Systolic pulmonic artery pressure (SPAP) is estimated at 51 mmHg consistent   with mild pulmonary hypertension (Right atrial pressure of 3 mmHg). Compared to previous study from 3- no changes noted. Assessment:  Encounter Diagnoses   Name Primary?  Essential hypertension     Hyperlipidemia, unspecified hyperlipidemia type     Coronary artery disease involving native coronary artery of native heart without angina pectoris     Paroxysmal atrial fibrillation (HCC) Yes    Chronic diastolic congestive heart failure ([address]          1. Paroxysmal atrial fibrillation currently appears to be sinus. 2. Chronic diastolic failure well compensated  3. Mild to moderate mitral regurgitation. no symptoms or heart failure. 4. Mild pulmonary hypertension   5. Hyperlipidemia   -3/1/2021 , HDL 53, LDL 65,   6. Hypertension   7. Diabetes mellitis  8. Carotid artery disease   9. Obesity   10. Sleep apnea    -on BIPAP   11. Dizziness on standing probably orthostatic hypotension  12. Chronic anemia due to CKD  13. CKD 3       Plan:  1. Recommend increasing Losartan to 50 mg daily and stopping hydralazine try to manage polypharmacy. Monitor BP at home if any change call me to discuss. 2. Continue other medications   3. Healthy lifestyle education reviewed including nutrition, exercise and activity  4. Follow up with me in 9 months       QUALITY MEASURES  1. Tobacco Cessation Counseling: NA  2. Retake of BP if >140/90:   NA  3. Documentation to PCP/referring for new patient:  Sent to PCP at close of office visit  4. CAD patient on anti-platelet: Yes  5. CAD patient on STATIN therapy:  Yes  6. Patient with CHF and aFib on anticoagulation:  Yes       This note was scribed in the presence of Eusebio Astorga MD by Bob Matthews RN  I, Dr. Bucky Mahmood, personally performed the services described in this documentation, as scribed by the above signed scribe in my presence.  It is both accurate and complete to my knowledge. I agree with the details independently gathered by the clinical support staff, while the remaining scribed note accurately describes my personal service to the patient.       Jennifer Schultz M.D

## 2021-03-30 ENCOUNTER — HOSPITAL ENCOUNTER (EMERGENCY)
Age: 81
Discharge: HOME | End: 2021-03-30
Payer: MEDICARE

## 2021-03-30 ENCOUNTER — APPOINTMENT (OUTPATIENT)
Dept: CT IMAGING | Age: 81
End: 2021-03-30
Payer: MEDICARE

## 2021-03-30 VITALS
DIASTOLIC BLOOD PRESSURE: 70 MMHG | HEART RATE: 65 BPM | BODY MASS INDEX: 41.78 KG/M2 | WEIGHT: 260 LBS | SYSTOLIC BLOOD PRESSURE: 178 MMHG | TEMPERATURE: 98.3 F | RESPIRATION RATE: 18 BRPM | OXYGEN SATURATION: 95 % | HEIGHT: 66 IN

## 2021-03-30 DIAGNOSIS — S00.01XA ABRASION OF SCALP, INITIAL ENCOUNTER: ICD-10-CM

## 2021-03-30 DIAGNOSIS — S09.90XA CLOSED HEAD INJURY, INITIAL ENCOUNTER: Primary | ICD-10-CM
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
## 2021-03-30 PROCEDURE — 72125 CT NECK SPINE W/O DYE: CPT

## 2021-03-30 PROCEDURE — 99283 EMERGENCY DEPT VISIT LOW MDM: CPT

## 2021-03-30 PROCEDURE — 90471 IMMUNIZATION ADMIN: CPT | Performed by: NURSE PRACTITIONER

## 2021-03-30 PROCEDURE — 6360000002 HC RX W HCPCS: Performed by: NURSE PRACTITIONER

## 2021-03-30 PROCEDURE — 90715 TDAP VACCINE 7 YRS/> IM: CPT | Performed by: NURSE PRACTITIONER

## 2021-03-30 PROCEDURE — 70450 CT HEAD/BRAIN W/O DYE: CPT

## 2021-03-30 RX ADMIN — TETANUS TOXOID, REDUCED DIPHTHERIA TOXOID AND ACELLULAR PERTUSSIS VACCINE, ADSORBED 0.5 ML: 5; 2.5; 8; 8; 2.5 SUSPENSION INTRAMUSCULAR at 15:32

## 2021-03-30 ASSESSMENT — ENCOUNTER SYMPTOMS
SORE THROAT: 0
COLOR CHANGE: 0
ABDOMINAL PAIN: 0
SHORTNESS OF BREATH: 0
RHINORRHEA: 0

## 2021-03-30 ASSESSMENT — PAIN DESCRIPTION - LOCATION: LOCATION: HEAD

## 2021-03-30 ASSESSMENT — PAIN SCALES - GENERAL: PAINLEVEL_OUTOF10: 5

## 2021-03-30 ASSESSMENT — PAIN DESCRIPTION - DESCRIPTORS: DESCRIPTORS: SHARP

## 2021-03-30 NOTE — ED PROVIDER NOTES
Evaluated by [id number] [facility] Provider          [facility] ED  EMERGENCY DEPARTMENT ENCOUNTER        Pt Name: Marlynn Bosworth  MRN: [id number]  [place] 1940  Dateof evaluation: 3/30/2021  Provider: Lata Bull APRN - CNP  PCP: Lala Barry MD  ED Attending: No att. providers found    [address] [place] [address]       Chief Complaint   Patient presents with    Fall     EMS states pt legs gave out and fell backwards striking head on tile floor, denies loc, lac to back of head, pt states was exercising this morning for first time in a while and more tire than normal, on elequis       HISTORY OF PRESENTILLNESS   (Location/Symptom, Timing/Onset, Context/Setting, Quality, Duration, Modifying Factors, Severity)  Note limiting factors. Marlynn Bosworth is a 68 y.o. female for mechanical fall. Onset was today. Context includes pt states she returned to the gym today after not going in 1 year. She states she walked for 15min. Pt reports she used the bathroom at home today and as she walked out of the bathroom her legs gave out. She denies any loc, dizziness, or light headedness. She states she is on blood thinners. Pt complaining only of laceration to back of head. Alleviating factors include nothing. Aggravating factors include nothing. Pain is 5/10. nothing has been used for pain today. Nursing Notes were all reviewed and agreed with or any disagreements were addressed  in the HPI. REVIEW OF SYSTEMS    (2-9 systems for level 4, 10 or more for level 5)     Review of Systems   Constitutional: Negative for fever. Mechanical fall   HENT: Negative for congestion, rhinorrhea and sore throat. Respiratory: Negative for shortness of breath. Cardiovascular: Negative for chest pain. Gastrointestinal: Negative for abdominal pain. Genitourinary: Negative for decreased urine volume and difficulty urinating. Musculoskeletal: Negative for arthralgias and myalgias.    Skin: Positive for wound. Negative for color change and rash. Neurological: Negative for dizziness and light-headedness. Psychiatric/Behavioral: Negative for agitation. All other systems reviewed and are negative. Positives and Pertinent negatives as per HPI. Except as noted above in the ROS, all other systems were reviewed and negative.        PAST MEDICAL HISTORY     Past Medical History:   Diagnosis Date    Anemia, unspecified 2010    neg bone marrow    Asthma     Atrial fibrillation ([address]     Baker's cyst     Basal cell carcinoma of left cheek 3/12/2019    Blind left eye     Carotid artery stenoses     Carotid stenosis 2002    left    Cervical spinal cord compression ([address] 11/2010    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-sided sciatica 8/9/2016    CKD (chronic kidney disease) stage 3, GFR 30-59 ml/min     Community acquired pneumonia of left lower lobe of lung 9/8/2018    CRI     Detached retina, left     Diverticulosis     DJD (degenerative joint disease)     Edema     chronic    Fatty liver     GERD (gastroesophageal reflux disease)     Herniated lumbar intervertebral disc     Hx stage 2 sacral decubitus ulcer 4/13/2018    Hyperlipidemia     Hypertension     Hypothyroid 1/27/2015    Morbid obesity with BMI of 40.0-44.9, adult ([address] 2/10/2017    BETSY treated with BiPAP     Parkinson's disease ([address]     Partial symptomatic epilepsy with complex partial seizures, not intractable, without status epilepticus ([address] 1/24/2020    Restless leg syndrome     Restless legs syndrome     Rheumatic fever 1950    2x IN [place]    Sleep apnea     Tremor, essential 8/16/2010    Type 2 diabetes mellitus without complication (HCC)     Type II or unspecified type diabetes mellitus without mention of complication, not stated as uncontrolled          SURGICAL HISTORY       Past Surgical History:   Procedure Laterality Date    APPENDECTOMY      CAROTID ENDARTERECTOMY      left    CATARACT REMOVAL  CERVICAL FUSION  11/2010    CHOLECYSTECTOMY      COLONOSCOPY N/A 3/8/2020    COLONOSCOPY FLEXIBLE W/ DECOMPRESSION performed by [facility] MD [facility] [place] HERNIA REPAIR      HYSTERECTOMY      JOINT REPLACEMENT Bilateral 2003    KNEE SURGERY      replacement x 2    SHOULDER SURGERY      TONSILLECTOMY      TUBAL LIGATION      UPPER GASTROINTESTINAL ENDOSCOPY  03/17/2020    Esophagitis    UPPER GASTROINTESTINAL ENDOSCOPY N/A 3/17/2020    EGD performed by Hubert Blackburn MD at [address] [facility]       Discharge Medication List as of 3/30/2021  3:40 PM      CONTINUE these medications which have NOT CHANGED    Details   losartan (COZAAR) 50 MG tablet Take 1 tablet by mouth daily, Disp-90 tablet, R-3Normal      ferrous sulfate (IRON 325) 325 (65 Fe) MG tablet Take 325 mg by mouth dailyHistorical Med      budesonide (PULMICORT) 180 MCG/ACT AEPB inhaler Inhale 2 puffs into the lungs 2 times dailyHistorical Med      tiZANidine (ZANAFLEX) 2 MG tablet Take 1 tablet by mouth 3 times daily as needed (jaw pain), Disp-30 tablet, R-0Normal      atorvastatin (LIPITOR) 40 MG tablet Take 1.5 tablets by mouth daily, Disp-135 tablet, R-3Normal      fluticasone-vilanterol (BREO ELLIPTA) 100-25 MCG/INH AEPB inhaler Inhale 1 puff into the lungs daily, Disp-90 each, R-1Normal      NIFEdipine (ADALAT CC) 60 MG extended release tablet Take 1 tablet by mouth daily, Disp-90 tablet, R-3Normal      propafenone (RYTHMOL) 150 MG tablet Take 1 tablet by mouth every 8 hours, Disp-270 tablet, R-3Normal      apixaban (ELIQUIS) 2.5 MG TABS tablet Take 1 tablet by mouth 2 times daily, Disp-180 tablet, R-3Normal      furosemide (LASIX) 40 MG tablet Take 0.5 tablets by mouth daily, Disp-60 tablet, R-5Normal      albuterol sulfate HFA (VENTOLIN HFA) 108 (90 Base) MCG/ACT inhaler Inhale 2 puffs into the lungs every 6 hours as needed for Wheezing, Disp-3 Inhaler, R-1Normal      fluticasone (FLONASE) 50 MCG/ACT nasal spray USE 2 PUFFS IN EACH NOSTRIL DAILY, Disp-3 Bottle, R-10Normal      DULoxetine (CYMBALTA) 60 MG extended release capsule Take 1 capsule by mouth daily, Disp-90 capsule, R-1Normal      montelukast (SINGULAIR) 10 MG tablet Take One Tablet By Mouth Daily, Disp-90 tablet, R-1Normal      metoprolol tartrate (LOPRESSOR) 50 MG tablet Take 1 tablet by mouth 2 times daily, Disp-180 tablet, R-1Normal      carbidopa-levodopa (SINEMET)  MG per tablet Take 1 tablet by mouth 4 times dailyHistorical Med      Alpha-Lipoic Acid 300 MG TABS Take by mouth dailyHistorical Med      lamoTRIgine (LAMICTAL) 25 MG tablet Take 25 mg by mouth 2 times daily 2 tabs BIDHistorical Med      Handicap Placard MISC Starting Fri 9/27/2019, Disp-1 each, R-0, PrintDX: G20  parkinson's disease Exp: 9/1/2024      ipratropium-albuterol (DUONEB) 0.5-2.5 (3) MG/3ML SOLN nebulizer solution Inhale 3 mLs into the lungs every 6 hours as needed for Shortness of Breath DX: J45.40, Disp-360 mL, R-1Normal      Multiple Vitamins-Minerals (THERAPEUTIC MULTIVITAMIN-MINERALS) tablet Take 1 tablet by mouth daily      aspirin EC 81 MG EC tablet Take 1 tablet by mouth daily. Start taking next week.   Indications: OTC, Disp-30 tablet, R-2               ALLERGIES     Levaquin [levofloxacin], Metoclopramide, Phenazopyridine, Pyridium [phenazopyridine hcl], Reglan [metoclopramide hcl], and Reglan [metoclopramide hcl]    FAMILY HISTORY       Family History   Problem Relation Age of Onset    Diabetes Mother     Heart Disease Mother     Diabetes Father     Heart Disease Father     Diabetes Sister           SOCIAL HISTORY       Social History     Socioeconomic History    Marital status:      Spouse name: Anny    Number of children: 2    Years of education: 15    Highest education level: None   Occupational History    None   Social Needs    Financial this dictation. EKG: All EKG's are interpreted by the Emergency Department Physician who either signs or Co-signs this chart in the absence of a cardiologist.  Please see their note for interpretation of EKG. RADIOLOGY:   Cervical spine CT interpreted by radiologist for no acute abnormality in the cervical spine. CT head without contrast interpreted by radiologist for no acute intracranial abnormality. Interpretation per the Radiologist below, if available at the time of this note:    CT CERVICAL SPINE WO CONTRAST   Final Result   No acute abnormality of the cervical spine. CT HEAD WO CONTRAST   Final Result   No acute intracranial abnormality. No results found. PROCEDURES   Unless otherwise noted below, none     Procedures     CRITICAL CARE TIME   N/A    CONSULTS:  None      EMERGENCYDEPARTMENT COURSE and DIFFERENTIAL DIAGNOSIS/MDM:   Vitals:    Vitals:    03/30/21 1341 03/30/21 1430 03/30/21 1520   BP: (!) 171/71 (!) 197/63 (!) 178/70   Pulse: 66 65 65   Resp: 16 18 18   Temp: 98.3 °F (36.8 °C)     TempSrc: Oral     SpO2: 94% 96% 95%   Weight: 260 lb (117.9 kg)     Height: 5' 6\" (1.676 m)         Patient was given the following medications:  Medications   Tetanus-Diphth-Acell Pertussis (BOOSTRIX) injection 0.5 mL (0.5 mLs Intramuscular Given 3/30/21 1532)     Patient was seen and evaluated by myself. Patient here for mechanical fall today when she fell striking the back of her head. There was no loss of consciousness but she does take blood thinners. On exam she is awake and alert hemodynamically stable nontoxic in appearance. Patient does have a hematoma to the back of her scalp with an abrasion. CT of her head and neck are both negative. Patient was updated on her tetanus. I do not feel that sutures are needed at this time is more of an abrasion. Bleeding is controlled.   Patient will be given instructions to follow-up with her primary care doctor in the next few days return to the ED for any worsening symptoms. She was ultimately discharged home with all questions answered. The patient tolerated their visit well. I have evaluated this patient. My supervising physician was available for consultation. The patient and / or the family were informed of the results of any tests, a time was given to answer questions, a plan was proposed and they agreed with plan. FINAL IMPRESSION      1. Closed head injury, initial encounter    2. Fall, initial encounter    3.  Abrasion of scalp, initial encounter          DISPOSITION/PLAN   DISPOSITION Decision To Discharge 03/30/2021 03:26:46 PM      PATIENT REFERRED TO:  MD Tawanda Mae [address] [facility] [address]  [phone]    Schedule an appointment as soon as possible for a visit in 2 days  for re-evaluation    [facility] ED  [address] [facility]  [phone]    If symptoms worsen      DISCHARGE MEDICATIONS:  Discharge Medication List as of 3/30/2021  3:40 PM          DISCONTINUED MEDICATIONS:  Discharge Medication List as of 3/30/2021  3:40 PM                 (Please note that portions of this note were completed with a voice recognition program.  Efforts were made to edit the dictations but occasionally words are mis-transcribed.)    MAHAD Pollock CNP (electronically signed)         MAHAD Pollock CNP  04/01/21 1019

## 2021-03-30 NOTE — ED NOTES
Wound cleansed, no dressing applied. Pt educated on proper care at home and when to return to ED. AVS reviewed, pt taken from ED via wheelchair to private vehicle.       Emilie Solano RN  03/30/21 [id number]

## 2021-03-30 NOTE — ED TRIAGE NOTES
Chief Complaint   Patient presents with    Fall     EMS states pt legs gave out and fell backwards striking head on tile floor, denies loc, lac to back of head, pt states was exercising this morning for first time in a while and more tire than normal

## 2021-03-31 ENCOUNTER — CARE COORDINATION (OUTPATIENT)
Dept: CARE COORDINATION | Age: 81
End: 2021-03-31

## 2021-03-31 NOTE — CARE COORDINATION
Ambulatory Care Coordination  ED Follow up Call    Reason for ED visit:  Pt legs gave out, she fell backward and hit her head on the ceramic-tiled floor. Status:     improved    Did you call your PCP prior to going to the ED? No      Did you receive a discharge instructions from the Emergency Room? Yes  Review of Instructions:     Understands what to report/when to return?:  Yes   Understands discharge instructions?:  Yes   Following discharge instructions?:  Yes   If not why? N/A    Are there any new complaints of pain? Yes, pt feels like her neck is \"bruised\"  New Pain Meds? No    Constipation prophylaxis needed? N/A    If you have a wound is the dressing clean, dry, and intact? N/A  Understands wound care regimen? N/A    Are there any other complaints/concerns that you wish to tell your provider? None other than the neck pain that is occurring since the fall. FU appts/Provider:    Future Appointments   Date Time Provider Katy Booker   8/26/2021  8:00 AM BIJAN Goldman [facility]   11/30/2021  9:00 AM MAHAD Sr - CNP [facility]   12/2/2021  9:00 AM MD Joann Barriga [facility]   12/21/2021  9:30 AM [facility]   12/28/2021 10:30 AM Jose Bah MD [facility]           New Medications?:   No      Medication Reconciliation by phone - N/A, pt and  were in the car. Understands Medications? Yes  Taking Medications? Yes  Can you swallow your pills? Yes    Any further needs in the home i.e. Equipment? Not Applicable, Pt  reports she has the equipment she needs at this time. Link to services in community?:  N/A   Which services:  N/A    Pt reports she is feeling some pain and she took Tylenol this morning, but this was mostly due to her jaw pain. Pt  stated he will call and f/u with patient PCP today when they get home. Pt reports the knot on the back of her head has decreased some in size.  She stated there is no drainage coming from the laceration site. Pt reports her BS was 119 this morning and her BP was 155/82. ACM briefly discussed the CM program and pt and her  are interested. ACM to f/u with them at a later date/time due to them being in the car during the call today. ACM encouraged both to call with any questions/concerns and they agreed with this plan.

## 2021-04-07 ENCOUNTER — CARE COORDINATION (OUTPATIENT)
Dept: CARE COORDINATION | Age: 81
End: 2021-04-07

## 2021-04-07 SDOH — HEALTH STABILITY: PHYSICAL HEALTH: ON AVERAGE, HOW MANY DAYS PER WEEK DO YOU ENGAGE IN MODERATE TO STRENUOUS EXERCISE (LIKE A BRISK WALK)?: 0 DAYS

## 2021-04-07 NOTE — CARE COORDINATION
Ambulatory Care Coordination Note  4/7/2021  CM Risk Score: 13  Charlson 10 Year Mortality Risk Score: 100%     ACC: Zuly Lujan RN    Summary Note: Pt reports her head is healing from her fall, but it is still a little bit sore. She has not had any feelings of dizziness or blurred vision. She will be f/u with her PCP office on 4.8. 21. Pt asked about possibly starting a PT program to help with her gait and strength. ACM informed pt that ACM will send a note to her PCP prior to the visit so they can discuss this in depth during her VV tomorrow. Pt agreed with this plan. Pt  asked if it would be possible for her to have a motorized W/C. ACM explained that we can look into this through her insurance company to see if she is eligible. Pt and  agreed with this plan. Pt  was able to review medication list with ACM today. He reports she will begin using her Breo inhaler after she finished with her Pulmicort. He stated the Pulmicort was helpful, but no longer covered by insurance and that is why they switched. Pt and  stated they had no other questions/concerns for today. ACM encouraged them to call if that changes and they agreed with this plan. PLAN:  DM Assessment  COPD Assessment  CHF Assessment  SDOH  F/U PT        Care Coordination Interventions    Program Enrollment: Complex Care  Referral from Primary Care Provider: No  Suggested Interventions and Community Resources         Goals Addressed    None         Prior to Admission medications    Medication Sig Start Date End Date Taking?  Authorizing Provider   losartan (COZAAR) 50 MG tablet Take 1 tablet by mouth daily 3/8/21  Yes Severiano Expose, MD   ferrous sulfate (IRON 325) 325 (65 Fe) MG tablet Take 325 mg by mouth daily 2/4/21 5/5/21 Yes Historical Provider, MD   budesonide (PULMICORT) 180 MCG/ACT AEPB inhaler Inhale 2 puffs into the lungs 2 times daily   Yes Historical Provider, MD   tiZANidine (ZANAFLEX) 2 MG tablet Take 1 tablet by mouth 3 times daily as needed (jaw pain) 2/26/21  Yes BIJAN Avilez   atorvastatin (LIPITOR) 40 MG tablet Take 1.5 tablets by mouth daily 1/14/21  Yes Army Paola MD   fluticasone-vilanterol (BREO ELLIPTA) 100-25 MCG/INH AEPB inhaler Inhale 1 puff into the lungs daily 1/13/21  Yes BIJAN Avilez   NIFEdipine (ADALAT CC) 60 MG extended release tablet Take 1 tablet by mouth daily 1/11/21  Yes Mayra Waller MD   propafenone [facility]) 150 MG tablet Take 1 tablet by mouth every 8 hours 1/11/21  Yes Mayra Waller MD   apixaban [facility]) 2.5 MG TABS tablet Take 1 tablet by mouth 2 times daily 1/11/21  Yes Mayra Waller MD   furosemide (LASIX) 40 MG tablet Take 0.5 tablets by mouth daily 1/11/21  Yes Mayra Waller MD   albuterol sulfate HFA (VENTOLIN HFA) 108 (90 Base) MCG/ACT inhaler Inhale 2 puffs into the lungs every 6 hours as needed for Wheezing 1/8/21  Yes BIJAN Avilez   fluticasone [facility]) 50 MCG/ACT nasal spray USE 2 PUFFS IN [facility] NOSTRIL DAILY 1/8/21  Yes BIJAN Avilez   DULoxetine (CYMBALTA) 60 MG extended release capsule Take 1 capsule by mouth daily 12/31/20  Yes BIJAN Avilez   montelukast (SINGULAIR) 10 MG tablet Take One Tablet By Mouth Daily 12/31/20  Yes BIJAN Avilez   metoprolol tartrate (LOPRESSOR) 50 MG tablet Take 1 tablet by mouth 2 times daily 12/31/20  Yes BIJAN Avilez   carbidopa-levodopa (SINEMET)  MG per tablet Take 1 tablet by mouth 4 times daily   Yes Historical Provider, MD   Alpha-Lipoic Acid 300 MG TABS Take by mouth daily   Yes Historical Provider, MD   lamoTRIgine (LAMICTAL) 25 MG tablet Take 25 mg by mouth 2 times daily 2 tabs BID   Yes Historical Provider, MD   Handicap Placard MISC by Does not apply route DX: Latasha Osullivan  parkinson's disease  Exp: 9/1/2024 9/27/19  Yes BIJAN Avilez   ipratropium-albuterol (DUONEB) 0.5-2.5 (3) MG/3ML SOLN nebulizer solution Inhale 3 mLs into the lungs every 6 hours as needed for

## 2021-04-08 ENCOUNTER — VIRTUAL VISIT (OUTPATIENT)
Dept: FAMILY MEDICINE CLINIC | Age: 81
End: 2021-04-08
Payer: MEDICARE

## 2021-04-08 ENCOUNTER — CARE COORDINATION (OUTPATIENT)
Dept: CARE COORDINATION | Age: 81
End: 2021-04-08

## 2021-04-08 DIAGNOSIS — R29.898 WEAKNESS OF BOTH LOWER EXTREMITIES: ICD-10-CM
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
## 2021-04-08 PROCEDURE — 99442 PR PHYS/QHP TELEPHONE EVALUATION 11-20 MIN: CPT | Performed by: PHYSICIAN ASSISTANT

## 2021-04-08 NOTE — CARE COORDINATION
Call to Javi Kay in [facility] to see what is required to start process for obtaining a power scooter/WC for pt. Spoke to Kelsy Roweleanna with [employer] who reports that she would need the RX for the power scooter/WC (must include patients WT & HT on RX), both insurance cards, and a recent face to face OV note faxed to fax #: [phone]. She reports that the [facility] location would be the location to service pt's residence. Kelsy Gibson reports that once documentation is received, she will send over paperwork to PCP office to be signed and completed. Kelsy Gibson reports that her secondary insurance should  remaining 20% copay that Medicare does not cover, but again, would need both insurance cards and all required documentation before they could submit to insurance for coverage. Kelsy Gibson reports that the face to face OV note must include:    ·  why a walker, cane, or standard wheelchair isn't sufficient in the patients home and is requiring a motorized scooter/Power WC. OV note must be within the last 45 days of prescription for scooter/WC to be submitted to insurance. ·  why it would benefit her in the home and how. ·  must state the need is in the home, not the community. Medicare only covers equipment for in home use, not community. · A specific statement as to activities of daily living in the patients home that the patient cannot perform independently (bathing, dressing, eating, preparing meals, etc.)    · Can be practically operated within the patients home and that it will assist the patient with managing a specific medical problem in her home.

## 2021-04-09 ENCOUNTER — TELEPHONE (OUTPATIENT)
Dept: FAMILY MEDICINE CLINIC | Age: 81
End: 2021-04-09

## 2021-04-09 ENCOUNTER — APPOINTMENT (OUTPATIENT)
Dept: GENERAL RADIOLOGY | Age: 81
End: 2021-04-09
Payer: MEDICARE

## 2021-04-09 ENCOUNTER — APPOINTMENT (OUTPATIENT)
Dept: CT IMAGING | Age: 81
End: 2021-04-09
Payer: MEDICARE

## 2021-04-09 ENCOUNTER — HOSPITAL ENCOUNTER (EMERGENCY)
Age: 81
Discharge: HOME | End: 2021-04-09
Attending: EMERGENCY MEDICINE
Payer: MEDICARE

## 2021-04-09 VITALS
OXYGEN SATURATION: 96 % | RESPIRATION RATE: 20 BRPM | SYSTOLIC BLOOD PRESSURE: 167 MMHG | HEART RATE: 73 BPM | BODY MASS INDEX: 41.78 KG/M2 | HEIGHT: 66 IN | WEIGHT: 260 LBS | DIASTOLIC BLOOD PRESSURE: 48 MMHG | TEMPERATURE: 98.1 F
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
## 2021-04-09 DIAGNOSIS — S50.02XA CONTUSION OF LEFT ELBOW, INITIAL ENCOUNTER: ICD-10-CM

## 2021-04-09 DIAGNOSIS — R29.898 WEAKNESS OF BOTH LOWER EXTREMITIES: ICD-10-CM

## 2021-04-09 DIAGNOSIS — S80.00XA CONTUSION OF KNEE, UNSPECIFIED LATERALITY, INITIAL ENCOUNTER: ICD-10-CM

## 2021-04-09 DIAGNOSIS — S00.83XA CONTUSION OF FACE, INITIAL ENCOUNTER: ICD-10-CM

## 2021-04-09 LAB
ANION GAP SERPL CALCULATED.3IONS-SCNC: 10 MMOL/L (ref 3–16)
APTT: 19.5 SEC (ref 24.2–36.2)
BASOPHILS ABSOLUTE: 0.1 K/UL (ref 0–0.2)
BASOPHILS RELATIVE PERCENT: 1.1 %
BUN BLDV-MCNC: 25 MG/DL (ref 7–20)
CALCIUM SERPL-MCNC: 9.5 MG/DL (ref 8.3–10.6)
CHLORIDE BLD-SCNC: 100 MMOL/L (ref 99–110)
CO2: 28 MMOL/L (ref 21–32)
CREAT SERPL-MCNC: 1.3 MG/DL (ref 0.6–1.2)
EOSINOPHILS ABSOLUTE: 0.4 K/UL (ref 0–0.6)
EOSINOPHILS RELATIVE PERCENT: 3.5 %
GFR AFRICAN AMERICAN: 48
GFR NON-AFRICAN AMERICAN: 40
GLUCOSE BLD-MCNC: 102 MG/DL (ref 70–99)
HCT VFR BLD CALC: 29.7 % (ref 36–48)
HEMOGLOBIN: 9.5 G/DL (ref 12–16)
INR BLD: 1.09 (ref 0.86–1.14)
LYMPHOCYTES ABSOLUTE: 1.9 K/UL (ref 1–5.1)
LYMPHOCYTES RELATIVE PERCENT: 15 %
MCH RBC QN AUTO: 28.4 PG (ref 26–34)
MCHC RBC AUTO-ENTMCNC: 32 G/DL (ref 31–36)
MCV RBC AUTO: 88.8 FL (ref 80–100)
MONOCYTES ABSOLUTE: 0.8 K/UL (ref 0–1.3)
MONOCYTES RELATIVE PERCENT: 6.4 %
NEUTROPHILS ABSOLUTE: 9.3 K/UL (ref 1.7–7.7)
NEUTROPHILS RELATIVE PERCENT: 74 %
PDW BLD-RTO: 14.1 % (ref 12.4–15.4)
PLATELET # BLD: 333 K/UL (ref 135–450)
PLATELET SLIDE REVIEW: ADEQUATE
PMV BLD AUTO: 8.1 FL (ref 5–10.5)
POTASSIUM REFLEX MAGNESIUM: 4.9 MMOL/L (ref 3.5–5.1)
PROTHROMBIN TIME: 12.7 SEC (ref 10–13.2)
RBC # BLD: 3.35 M/UL (ref 4–5.2)
SLIDE REVIEW: ABNORMAL
SODIUM BLD-SCNC: 138 MMOL/L (ref 136–145)
WBC # BLD: 12.6 K/UL (ref 4–11)

## 2021-04-09 PROCEDURE — 73560 X-RAY EXAM OF KNEE 1 OR 2: CPT

## 2021-04-09 PROCEDURE — 80048 BASIC METABOLIC PNL TOTAL CA: CPT

## 2021-04-09 PROCEDURE — 72125 CT NECK SPINE W/O DYE: CPT

## 2021-04-09 PROCEDURE — 70486 CT MAXILLOFACIAL W/O DYE: CPT

## 2021-04-09 PROCEDURE — 85610 PROTHROMBIN TIME: CPT

## 2021-04-09 PROCEDURE — 85730 THROMBOPLASTIN TIME PARTIAL: CPT

## 2021-04-09 PROCEDURE — 73080 X-RAY EXAM OF ELBOW: CPT

## 2021-04-09 PROCEDURE — 85025 COMPLETE CBC W/AUTO DIFF WBC: CPT

## 2021-04-09 PROCEDURE — 99285 EMERGENCY DEPT VISIT HI MDM: CPT

## 2021-04-09 PROCEDURE — 99284 EMERGENCY DEPT VISIT MOD MDM: CPT

## 2021-04-09 PROCEDURE — 70450 CT HEAD/BRAIN W/O DYE: CPT

## 2021-04-09 ASSESSMENT — PAIN DESCRIPTION - FREQUENCY: FREQUENCY: CONTINUOUS

## 2021-04-09 NOTE — ED PROVIDER NOTES
[address] ED  EMERGENCY DEPARTMENT ENCOUNTER      Pt Name: Lucita Garcia  MRN: [id number]  [place] 1940  Date of evaluation: 4/9/2021  Provider: Donna Engel MD    CHIEF COMPLAINT       Chief Complaint   Patient presents with    Fall     fell at home this am, hit head on entertainment center, pt on blood thinners         HISTORY OF PRESENT ILLNESS   (Location/Symptom, Timing/Onset, Context/Setting, Quality, Duration, Modifying Factors, Severity)  Note limiting factors. Lucita Garcia is a 68 y.o. female with past medical history of hypertension, hyperlipidemia, anemia, coronary artery disease, parkinsonism, paroxysmal atrial fibrillation on anticoagulation here today after a fall    Patient was walking with her walker into her home when she tripped, fell and hit her head on an entertainment center. She is on Eliquis. Has had significant swelling and pain in the left side of her face and has a headache. Denies neck pain. She states she landed on both knees and has mild swelling there but has been able to walk since the incident. Denies any chest or abdominal injury. Pain is throbbing moderate worse to touch. 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 No obvious alleviating factors. HPI    Nursing Notes were reviewed. REVIEW OF SYSTEMS    (2-9 systems for level 4, 10 or more for level 5)     Review of Systems    Please see HPI for pertinent positive and negative review of system findings. A full 10 system ROS was performed and otherwise negative.         PAST MEDICAL HISTORY     Past Medical History:   Diagnosis Date    Anemia, unspecified 2010    neg bone marrow    Asthma     [facility]     Baker's cyst     Basal cell carcinoma of left cheek 3/12/2019    Blind left eye     Carotid artery stenoses     Carotid stenosis 2002    left    Cervical spinal cord compression ([facility] [address] 11/2010    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-sided sciatica 8/9/2016    CKD (chronic kidney disease) stage 3, GFR 30-59 ml/min     Community acquired pneumonia of left lower lobe of lung 9/8/2018    CRI     Detached retina, left     Diverticulosis     DJD (degenerative joint disease)     Edema     chronic    Fatty liver     GERD (gastroesophageal reflux disease)     Herniated lumbar intervertebral disc     Hx stage 2 sacral decubitus ulcer 4/13/2018    Hyperlipidemia     Hypertension     Hypothyroid 1/27/2015    Morbid obesity with BMI of 40.0-44.9, adult ([address] 2/10/2017    BETSY treated with BiPAP     Parkinson's disease [facility] [address]     Partial symptomatic epilepsy with complex partial seizures, not intractable, without status epilepticus ([facility] [address] 1/24/2020    Restless leg syndrome     Restless legs syndrome     Rheumatic fever 1950    2x IN [place]    Sleep apnea     Tremor, essential 8/16/2010    Type 2 diabetes mellitus without complication (HCC)     Type II or unspecified type diabetes mellitus without mention of complication, not stated as uncontrolled          SURGICAL HISTORY       Past Surgical History:   Procedure Laterality Date    APPENDECTOMY      CAROTID ENDARTERECTOMY      left    CATARACT REMOVAL      CERVICAL FUSION  11/2010    CHOLECYSTECTOMY      COLONOSCOPY N/A 3/8/2020    COLONOSCOPY FLEXIBLE W/ DECOMPRESSION performed by Sulaiman Llanos MD at [address] REPLACEMENT Bilateral 2003    KNEE SURGERY      replacement x 2    SHOULDER SURGERY      TONSILLECTOMY      TUBAL LIGATION      UPPER GASTROINTESTINAL ENDOSCOPY  03/17/2020    Esophagitis    UPPER GASTROINTESTINAL ENDOSCOPY N/A 3/17/2020    EGD performed by Jennifer Hinton MD at [address] [facility]       Discharge Medication List as of 4/9/2021  3:05 PM      CONTINUE these medications which have NOT CHANGED    Details   losartan (COZAAR) 50 MG tablet Take 1 tablet by mouth daily, Disp-90 tablet, R-3Normal      ferrous sulfate (IRON 325) 325 (65 Fe) MG tablet Take 325 mg by mouth dailyHistorical Med      budesonide (PULMICORT) 180 MCG/ACT AEPB inhaler Inhale 2 puffs into the lungs 2 times dailyHistorical Med      tiZANidine (ZANAFLEX) 2 MG tablet Take 1 tablet by mouth 3 times daily as needed (jaw pain), Disp-30 tablet, R-0Normal      atorvastatin (LIPITOR) 40 MG tablet Take 1.5 tablets by mouth daily, Disp-135 tablet, R-3Normal      fluticasone-vilanterol (BREO ELLIPTA) 100-25 MCG/INH AEPB inhaler Inhale 1 puff into the lungs daily, Disp-90 each, R-1Normal      NIFEdipine (ADALAT CC) 60 MG extended release tablet Take 1 tablet by mouth daily, Disp-90 tablet, R-3Normal      propafenone (RYTHMOL) 150 MG tablet Take 1 tablet by mouth every 8 hours, Disp-270 tablet, R-3Normal      apixaban (ELIQUIS) 2.5 MG TABS tablet Take 1 tablet by mouth 2 times daily, Disp-180 tablet, R-3Normal      furosemide (LASIX) 40 MG tablet Take 0.5 tablets by mouth daily, Disp-60 tablet, R-5Normal      albuterol sulfate HFA (VENTOLIN HFA) 108 (90 Base) MCG/ACT inhaler Inhale 2 puffs into the lungs every 6 hours as needed for Wheezing, Disp-3 Inhaler, R-1Normal      fluticasone (FLONASE) 50 MCG/ACT nasal spray USE 2 PUFFS IN EACH NOSTRIL DAILY, Disp-3 Bottle, R-10Normal      DULoxetine (CYMBALTA) 60 MG extended release capsule Take 1 capsule by mouth daily, Disp-90 capsule, R-1Normal      montelukast (SINGULAIR) 10 MG tablet Take One Tablet By Mouth Daily, Disp-90 tablet, R-1Normal      metoprolol tartrate (LOPRESSOR) 50 MG tablet Take 1 tablet by mouth 2 times daily, Disp-180 tablet, R-1Normal      carbidopa-levodopa (SINEMET)  MG per tablet Take 1 tablet by mouth 4 times dailyHistorical Med      Alpha-Lipoic Acid 300 MG TABS Take by mouth dailyHistorical Med lamoTRIgine (LAMICTAL) 25 MG tablet Take 25 mg by mouth 2 times daily 2 tabs BIDHistorical Med      Handicap Placard MISC Starting Fri 9/27/2019, Disp-1 each, R-0, PrintDX: Josr Campbell  parkinson's disease Exp: 9/1/2024      ipratropium-albuterol (DUONEB) 0.5-2.5 (3) MG/3ML SOLN nebulizer solution Inhale 3 mLs into the lungs every 6 hours as needed for Shortness of Breath DX: J45.40, Disp-360 mL, R-1Normal      Multiple Vitamins-Minerals (THERAPEUTIC MULTIVITAMIN-MINERALS) tablet Take 1 tablet by mouth daily      aspirin EC 81 MG EC tablet Take 1 tablet by mouth daily. Start taking next week. Indications: OTC, Disp-30 tablet, R-2             ALLERGIES     Levaquin [levofloxacin], Metoclopramide, Phenazopyridine, Pyridium [phenazopyridine hcl], Reglan [metoclopramide hcl], and Reglan [metoclopramide hcl]    FAMILY HISTORY       Family History   Problem Relation Age of Onset    Diabetes Mother     Heart Disease Mother     Diabetes Father     Heart Disease Father     Diabetes Sister           SOCIAL HISTORY       Social History     Socioeconomic History    Marital status:      Spouse name: Jyoti Arshad Number of children: 2    Years of education: 15    Highest education level: None   Occupational History    None   Social Needs    Financial resource strain: Not very hard    Food insecurity     Worry: Never true     Inability: Never true    Transportation needs     Medical: No     Non-medical: No   Tobacco Use    Smoking status: Never Smoker    Smokeless tobacco: Never Used   Substance and Sexual Activity    Alcohol use: No     Alcohol/week: 0.0 standard drinks    Drug use: No    Sexual activity: Not Currently   Lifestyle    Physical activity     Days per week: 0 days     Minutes per session: 0 min    Stress:  Only a little   Relationships    Social connections     Talks on phone: More than three times a week     Gets together: None     Attends Caodaism service: None     Active member of club or organization: None     Attends meetings of clubs or organizations: None     Relationship status: None    Intimate partner violence     Fear of current or ex partner: None     Emotionally abused: None     Physically abused: None     Forced sexual activity: None   Other Topics Concern    None   Social History Narrative    ** Merged History Encounter **            SCREENINGS    Rayle Coma Scale  Eye Opening: Spontaneous  Best Verbal Response: Oriented  Best Motor Response: Obeys commands  Rayle Coma Scale Score: 15          PHYSICAL EXAM    (up to 7 for level 4, 8 or more for level 5)     ED Triage Vitals   BP Temp Temp Source Pulse Resp SpO2 Height Weight   04/09/21 1133 04/09/21 1134 04/09/21 1134 04/09/21 1133 04/09/21 1133 04/09/21 1133 04/09/21 1134 04/09/21 1134   (!) 148/77 98.1 °F (36.7 °C) Oral 73 20 97 % 5' 6\" (1.676 m) 260 lb (117.9 kg)       Physical Exam    General appearance:  Cooperative. No acute distress. Skin:  Warm. Dry. Eye:  Extraocular movements intact. Significant soft tissue swelling and hematoma formation in the left superior and inferior eyelids extending into the left face. Patient has chronic scarring and cloudiness of the left cornea and has no vision in the left eye which she reports is chronic. No obvious hyphema. No obvious ocular trauma. Extraocular motions are otherwise intact. Vision in the right eye is normal and there is no surrounding trauma to the right eye  Ears, nose, mouth and throat:  Oral mucosa moist,  Neck:  Trachea midline. Heart:  Regular rate and rhythm  Perfusion:  intact  Respiratory:  Lungs clear to auscultation bilaterally. Respirations nonlabored. Abdominal:   Non distended. Nontender  Neurological:  Alert and oriented x 3. Moves all extremities spontaneously  Musculoskeletal:   Normal ROM, no deformities. Bruising and ecchymosis around the bilateral knees and bilateral hands. Normal flexion extension of the bilateral knees.   Pelvis stable with no tenderness. Normal range of motion of the bilateral hips. Psychiatric:  Normal mood      DIAGNOSTIC RESULTS       Labs Reviewed   CBC WITH AUTO DIFFERENTIAL - Abnormal; Notable for the following components:       Result Value    WBC 12.6 (*)     RBC 3.35 (*)     Hemoglobin 9.5 (*)     Hematocrit 29.7 (*)     Neutrophils Absolute 9.3 (*)     All other components within normal limits    Narrative:     Performed at:  [facility],  [place] [facility]   Phone [phone]   APTT - Abnormal; Notable for the following components:    aPTT 19.5 (*)     All other components within normal limits    Narrative:     Performed at:  [facility],  [place]   Phone [phone]   BASIC METABOLIC PANEL W/ REFLEX TO MG FOR LOW K - Abnormal; Notable for the following components:    Glucose 102 (*)     BUN 25 (*)     CREATININE 1.3 (*)     GFR Non- 40 (*)     GFR  48 (*)     All other components within normal limits    Narrative:     Performed at:  [facility],  [place] [facility]   Phone [phone]   PROTIME-INR    Narrative:     Performed at:  [facility],  [place]   Phone [phone]       Interpretation per the Radiologist below, if obtained/available at the time of this note:    CT MAXILLOFACIAL WO CONTRAST   Final Result   No acute osseous abnormality of the facial bones. Prominent soft tissue injury overlying the left maxilla and in the left   periorbital region with focal hematomas and soft tissue gas. CT Cervical Spine WO Contrast   Final Result   No acute abnormality of the cervical spine. CT Head WO Contrast   Final Result   1. No evidence of acute intracranial abnormality.    2. Advanced arthropathic changes involving the left temporomandibular joint   with associated marked thinning and suggestion of focal cortical breach   involving adjacent base of the left temporal bone as described above. XR ELBOW LEFT (MIN 3 VIEWS)   Final Result   Right knee:      1. Prior right knee arthroplasty and patellar resurfacing. No significant   periprosthetic lucency or acute osseous abnormality. 2. Calcifications and moderate soft tissue edema along the anterior knee. 3. Diffuse osteopenia. Left knee:      1. Prior left knee arthroplasty and patellar resurfacing. No significant   periprosthetic lucency or acute osseous abnormality. 2. Diffuse osteopenia. 3. Mild-to-moderate edema in the soft tissues about the left knee. Left elbow:      1. Mild degenerative changes and enthesopathy as above. 2. No acute fracture or dislocation. XR KNEE RIGHT (1-2 VIEWS)   Final Result   Right knee:      1. Prior right knee arthroplasty and patellar resurfacing. No significant   periprosthetic lucency or acute osseous abnormality. 2. Calcifications and moderate soft tissue edema along the anterior knee. 3. Diffuse osteopenia. Left knee:      1. Prior left knee arthroplasty and patellar resurfacing. No significant   periprosthetic lucency or acute osseous abnormality. 2. Diffuse osteopenia. 3. Mild-to-moderate edema in the soft tissues about the left knee. Left elbow:      1. Mild degenerative changes and enthesopathy as above. 2. No acute fracture or dislocation. XR KNEE LEFT (1-2 VIEWS)   Final Result   Right knee:      1. Prior right knee arthroplasty and patellar resurfacing. No significant   periprosthetic lucency or acute osseous abnormality. 2. Calcifications and moderate soft tissue edema along the anterior knee. 3. Diffuse osteopenia. Left knee:      1. Prior left knee arthroplasty and patellar resurfacing. No significant   periprosthetic lucency or acute osseous abnormality.    2. Diffuse osteopenia. 3. Mild-to-moderate edema in the soft tissues about the left knee. Left elbow:      1. Mild degenerative changes and enthesopathy as above. 2. No acute fracture or dislocation. All other labs/imaging were within normal range or not returned as of this dictation. EMERGENCY DEPARTMENT COURSE and DIFFERENTIAL DIAGNOSIS/MDM:   Vitals:    Vitals:    04/09/21 1134 04/09/21 1357 04/09/21 1408 04/09/21 1512   BP:  (!) 157/118 (!) 176/85 (!) 167/48   Pulse:       Resp:       Temp: 98.1 °F (36.7 °C)      TempSrc: Oral      SpO2:  98% 95% 96%   Weight: 260 lb (117.9 kg)      Height: 5' 6\" (1.676 m)          Patient presented today after mechanical fall with head injury. Large hematoma over the left face/periorbital region. She is already blind in this eye but does not appear to have suffered any new ocular damage. Imaging performed shows large facial hematoma and ecchymosis but notes no underlying fracture. No intracranial abnormality. Remainder of plain films of the knees were also within normal limits. Patient able to ambulate here. We placed ice on her facial ecchymosis/hematoma the seems to be improving. She ultimately may be discharged home. Secondary survey shows no other signs of injury or trauma    MDM    CONSULTS     None    Critical Care:   None    REASSESSMENT          PROCEDURE     Unless otherwise noted below, none     Procedures      FINAL IMPRESSION      1. Fall, initial encounter    2. Contusion of face, initial encounter    3. Contusion of knee, unspecified laterality, initial encounter    4.  Contusion of left elbow, initial encounter            DISPOSITION/PLAN   DISPOSITION Decision To Discharge 04/09/2021 03:03:55 PM        PATIENT REFERRED TO:  MD Tawanda Fernando Simeon [address] [place] [id number]  [phone]    Schedule an appointment as soon as possible for a visit         DISCHARGE MEDICATIONS:  Discharge Medication List as of 4/9/2021 3:05 PM        Controlled Substances Monitoring:     RX Monitoring 2/19/2019   Attestation The Prescription Monitoring Report for this patient was reviewed today. Periodic Controlled Substance Monitoring No signs of potential drug abuse or diversion identified.        (Please note that portions of this note were completed with a voice recognition program.  Efforts were made to edit the dictations but occasionally words are mis-transcribed.)    Román Poe MD (electronically signed)  Attending Emergency Physician            Selvin Dodge MD  04/12/21 0449

## 2021-04-09 NOTE — TELEPHONE ENCOUNTER
Carmencita Vyas, calling in behalf of patient (ok per HIPAA)    Patient had a telephone visit yesterday with Brandyn Hopkins, and was referred to physical therapy in [facility]. Carmencita Vyas states patient is being seen in ED for Fall with injury that happened today. Pt hit her head after fall. Carmencita Vyas is requesting if physical therapy referral could be switched to at home physical therapy. Please review/advise.  Thank you

## 2021-04-09 NOTE — TELEPHONE ENCOUNTER
Does she have a home physical therapy provider in mind? If not we would just place a Majel Heimlich home physical therapy order.

## 2021-04-12 ENCOUNTER — CARE COORDINATION (OUTPATIENT)
Dept: CARE COORDINATION | Age: 81
End: 2021-04-12

## 2021-04-12 ENCOUNTER — TELEPHONE (OUTPATIENT)
Dept: FAMILY MEDICINE CLINIC | Age: 81
End: 2021-04-12

## 2021-04-12 NOTE — TELEPHONE ENCOUNTER
----- Message from Daryl Kunz RN sent at 4/9/2021  3:36 PM EDT -----  Regarding: Motorized W/C  Hello,    This patient had a recent VV with Paige Castro. Pt is interested in a motorized wheelchair. Unfortunately, the patient is currently back in the ED for falling as I'm writing this note. (4.9.21 at [phone])     I have listed the information below regarding getting a prescription started for her wheelchair. As she is currently in the ED, I'm not sure if she'll be admitted or not, but either way, would someone please schedule an in-person follow up appointment, please? This is part of the requirement to help her get the wheelchair. [address] stated she will need a prescription with the patient's HEIGHT and WEIGHT written on the prescription. They will also need the most recent face to face OV note with the following information:    ·  why a walker, cane, or standard wheelchair isn't sufficient in the patient's home and is requiring a motorized scooter/Power WC. OV note must be within the last 45 days of prescription for scooter/WC to be submitted to insurance.     ·  why it would benefit her in the home and how.     ·  must state the need is in the home, not the community. Medicare only covers equipment for in home use, not community.     · A specific statement as to activities of daily living in the patient's home that the patient cannot perform independently (bathing, dressing, eating, preparing meals, etc.)     · Can be practically operated within the patient's home and that it will assist the patient with managing a specific medical problem in her home.     Thank you,    EDGAR Choi, RN  Ambulatory Care Manager  [phone]

## 2021-04-12 NOTE — CARE COORDINATION
Ambulatory Care Coordination Note  4/12/2021  CM Risk Score: 13  Charlson 10 Year Mortality Risk Score: 100%     ACC: Miky Moore, RN    Summary Note: [facility] called to update pt about how to try to obtain a W/C. [facility] also called to ask how patient was doing as she was back in the ED on 4.9.21 for falling and hitting her head. She stated she is feeling better every day, but her face is pretty bruised and sore. She has been using ice on her face and knees to help with the swelling and discomfort. Pt has a f/u visit on 4.16.21. [facility] informed pt that [facility] has sent the information that will need to be obtained from the visit to send to the insurance company in order to try to get a W/C. Pt verbalized understanding. [facility] has also sent this information to the [facility] team to give them a heads up before the appointment. Pt reports her  has also requested to have Katharinajoséchetna Bridgette PT at this time instead of PT at the [facility] and AC verified the order has been placed, today. Pt has no other questions/concerns today. AC encouraged her to call if anything comes up. PLAN:    Complete DM, CHF COPD  Complete SDOH  GOALs          Care Coordination Interventions    Program Enrollment: Complex Care  Referral from Primary Care Provider: No  Suggested Interventions and Community Resources         Goals Addressed    None         Prior to Admission medications    Medication Sig Start Date End Date Taking?  Authorizing Provider   losartan (COZAAR) 50 MG tablet Take 1 tablet by mouth daily 3/8/21   Ananda Stevenson MD   ferrous sulfate (IRON 325) 325 (65 Fe) MG tablet Take 325 mg by mouth daily 2/4/21 5/5/21  Historical Provider, MD   budesonide (PULMICORT) 180 MCG/ACT AEPB inhaler Inhale 2 puffs into the lungs 2 times daily    Historical Provider, MD   tiZANidine (ZANAFLEX) 2 MG tablet Take 1 tablet by mouth 3 times daily as needed (jaw pain) 2/26/21   BIJAN James   atorvastatin (LIPITOR) 40 MG tablet Take 1.5 tablets by mouth daily    Historical Provider, MD   solifenacin (VESICARE) 5 MG tablet Take 1 tablet by mouth daily. 6/25/13 10/29/13  Rachael Chadwick MD   aspirin EC 81 MG EC tablet Take 1 tablet by mouth daily. Start taking next week.   Indications: OTC 2/25/13   Johanna Zelaya MD       Future Appointments   Date Time Provider Katy Booker   4/16/2021 11:00 AM [facility]   8/26/2021  8:00 AM BIJAN Acosta [facility]   11/30/2021  9:00 AM Lisandro Billings APRN - CNP [facility]   12/2/2021  9:00 AM Nneka Bland MD [facility]   12/21/2021  9:30 AM MHC [facility]IN [facility]   12/28/2021 10:30 AM Ryan Curry MD [facility]     ,   General Assessment    Do you have any symptoms that are causing concern?: Yes  Progression since Onset: Unchanged  Reported Symptoms: Pain (Comment: facial pain from recent fall)     , No linked episodes and 13

## 2021-04-12 NOTE — TELEPHONE ENCOUNTER
Patient is scheduled 4-16 for ER follow up; will she need a separate appointment for the wheelchair evaluation?

## 2021-04-16 ENCOUNTER — OFFICE VISIT (OUTPATIENT)
Dept: FAMILY MEDICINE CLINIC | Age: 81
End: 2021-04-16
Payer: MEDICARE

## 2021-04-16 VITALS
BODY MASS INDEX: 44.56 KG/M2 | WEIGHT: 261 LBS | SYSTOLIC BLOOD PRESSURE: 108 MMHG | TEMPERATURE: 97.8 F | HEART RATE: 66 BPM | OXYGEN SATURATION: 96 % | HEIGHT: 64 IN | DIASTOLIC BLOOD PRESSURE: 52 MMHG

## 2021-04-16 DIAGNOSIS — R29.6 FREQUENT FALLS: Primary | ICD-10-CM

## 2021-04-16 DIAGNOSIS — R29.898 WEAKNESS OF BOTH LOWER EXTREMITIES: ICD-10-CM

## 2021-04-16 DIAGNOSIS — G20 PARKINSON'S DISEASE (HCC): Chronic | ICD-10-CM

## 2021-04-16 DIAGNOSIS — G62.9 PERIPHERAL POLYNEUROPATHY: ICD-10-CM

## 2021-04-16 PROCEDURE — 1090F PRES/ABSN URINE INCON ASSESS: CPT | Performed by: PHYSICIAN ASSISTANT

## 2021-04-16 PROCEDURE — 1036F TOBACCO NON-USER: CPT | Performed by: PHYSICIAN ASSISTANT

## 2021-04-16 PROCEDURE — G8427 DOCREV CUR MEDS BY ELIG CLIN: HCPCS | Performed by: PHYSICIAN ASSISTANT

## 2021-04-16 PROCEDURE — 1111F DSCHRG MED/CURRENT MED MERGE: CPT | Performed by: PHYSICIAN ASSISTANT

## 2021-04-16 PROCEDURE — 99213 OFFICE O/P EST LOW 20 MIN: CPT | Performed by: PHYSICIAN ASSISTANT

## 2021-04-16 PROCEDURE — G8417 CALC BMI ABV UP PARAM F/U: HCPCS | Performed by: PHYSICIAN ASSISTANT

## 2021-04-16 PROCEDURE — 4040F PNEUMOC VAC/ADMIN/RCVD: CPT | Performed by: PHYSICIAN ASSISTANT

## 2021-04-16 PROCEDURE — G8400 PT W/DXA NO RESULTS DOC: HCPCS | Performed by: PHYSICIAN ASSISTANT

## 2021-04-16 PROCEDURE — 1123F ACP DISCUSS/DSCN MKR DOCD: CPT | Performed by: PHYSICIAN ASSISTANT

## 2021-04-16 ASSESSMENT — ENCOUNTER SYMPTOMS
RHINORRHEA: 0
ABDOMINAL PAIN: 0
SORE THROAT: 0
SHORTNESS OF BREATH: 0
DIARRHEA: 0
VOMITING: 0
FACIAL SWELLING: 1
NAUSEA: 0
CONSTIPATION: 0
COUGH: 0
EYE PAIN: 1

## 2021-04-16 NOTE — PROGRESS NOTES
2021  Ely Jackson (: 1940)  68 y.o.      HPI    2 recent trips to ED for mechanical falls. Initially on 3/30 and again on   CT head wo contrast, ct c spine, and ct maxillofacial  without acute abnormality   Doing ok since then, no further falls. Feels unsteady \"all the time. \" unable to use manual wheelchair in the house 2/2 to decreased upper extremity strength and parkinson's disease. She does maintain hand and finger dexterity. Still with large left temporal and maxillary hematoma. Also on bilateral upper and lower extremities. Ely Jackson was evaluated today and a DME order was entered for a power wheelchair because she requires this to successfully complete daily living tasks of bathing, personal cares, ambulating, grooming, dressing upper body and meal preparation. A power wheelchair is necessary due to the patient's unsteady gait from parkinson's and neuropathy, upper body weakness, and inability to  an ambulation device; and she cannot ambulate only by pushing a walker instead of a lesser assistive device such as a cane, crutch, or standard walker. She does not have the upper body strength to push a manual wheelchair. Patient cannot use a POV due to lack of postural stability. Lives in a one story home that is conducive to a power wheelchair. The need for this equipment was discussed with the patient and she understands and is in agreement. Review of Systems   Constitutional: Negative for activity change, chills and fever. HENT: Positive for facial swelling. Negative for congestion, ear pain, rhinorrhea and sore throat. Eyes: Positive for pain. Negative for visual disturbance. Respiratory: Negative for cough and shortness of breath. Cardiovascular: Negative for chest pain and palpitations. Gastrointestinal: Negative for abdominal pain, constipation, diarrhea, nausea and vomiting. Genitourinary: Negative for difficulty urinating and dysuria. Musculoskeletal: Positive for arthralgias, gait problem and myalgias. Skin: Negative for rash. Neurological: Positive for tremors, weakness and numbness. Negative for dizziness. Psychiatric/Behavioral: Negative for sleep disturbance. Allergies, past medical history, family history, and social history reviewed and unchanged from previous encounter.      Current Outpatient Medications   Medication Sig Dispense Refill    losartan (COZAAR) 50 MG tablet Take 1 tablet by mouth daily 90 tablet 3    ferrous sulfate (IRON 325) 325 (65 Fe) MG tablet Take 325 mg by mouth daily      atorvastatin (LIPITOR) 40 MG tablet Take 1.5 tablets by mouth daily 135 tablet 3    fluticasone-vilanterol (BREO ELLIPTA) 100-25 MCG/INH AEPB inhaler Inhale 1 puff into the lungs daily 90 each 1    NIFEdipine (ADALAT CC) 60 MG extended release tablet Take 1 tablet by mouth daily 90 tablet 3    propafenone (RYTHMOL) 150 MG tablet Take 1 tablet by mouth every 8 hours 270 tablet 3    apixaban (ELIQUIS) 2.5 MG TABS tablet Take 1 tablet by mouth 2 times daily 180 tablet 3    furosemide (LASIX) 40 MG tablet Take 0.5 tablets by mouth daily 60 tablet 5    albuterol sulfate HFA (VENTOLIN HFA) 108 (90 Base) MCG/ACT inhaler Inhale 2 puffs into the lungs every 6 hours as needed for Wheezing 3 Inhaler 1    fluticasone (FLONASE) 50 MCG/ACT nasal spray USE 2 PUFFS IN EACH NOSTRIL DAILY 3 Bottle 10    DULoxetine (CYMBALTA) 60 MG extended release capsule Take 1 capsule by mouth daily 90 capsule 1    montelukast (SINGULAIR) 10 MG tablet Take One Tablet By Mouth Daily 90 tablet 1    metoprolol tartrate (LOPRESSOR) 50 MG tablet Take 1 tablet by mouth 2 times daily 180 tablet 1    carbidopa-levodopa (SINEMET)  MG per tablet Take 1 tablet by mouth 4 times daily      Alpha-Lipoic Acid 300 MG TABS Take by mouth daily      lamoTRIgine (LAMICTAL) 25 MG tablet Take 25 mg by mouth 2 times daily 2 tabs BID      Handicap Placard MISC by Does not apply route DX: G20  parkinson's disease  Exp: 9/1/2024 1 each 0    ipratropium-albuterol (DUONEB) 0.5-2.5 (3) MG/3ML SOLN nebulizer solution Inhale 3 mLs into the lungs every 6 hours as needed for Shortness of Breath DX: J45.40 360 mL 1    Multiple Vitamins-Minerals (THERAPEUTIC MULTIVITAMIN-MINERALS) tablet Take 1 tablet by mouth daily      aspirin EC 81 MG EC tablet Take 1 tablet by mouth daily. Start taking next week. Indications: OTC 30 tablet 2    budesonide (PULMICORT) 180 MCG/ACT AEPB inhaler Inhale 2 puffs into the lungs 2 times daily       No current facility-administered medications for this visit. Vitals:    04/16/21 1057   BP: (!) 108/52   Site: Right Lower Arm   Position: Sitting   Cuff Size: Large Adult   Pulse: 66   Temp: 97.8 °F (36.6 °C)   TempSrc: Oral   SpO2: 96%  Comment: RA   Weight: 261 lb (118.4 kg)   Height: 5' 4\" (1.626 m)     Estimated body mass index is 44.8 kg/m² as calculated from the following:    Height as of this encounter: 5' 4\" (1.626 m). Weight as of this encounter: 261 lb (118.4 kg). Physical Exam  Constitutional:       General: She is not in acute distress. Appearance: She is well-developed. HENT:      Head: Normocephalic and atraumatic. Eyes:      Conjunctiva/sclera: Conjunctivae normal.      Pupils: Pupils are equal, round, and reactive to light. Neck:      Musculoskeletal: Neck supple. Cardiovascular:      Rate and Rhythm: Normal rate and regular rhythm. Heart sounds: Normal heart sounds. No murmur. Pulmonary:      Effort: Pulmonary effort is normal.      Breath sounds: Normal breath sounds. No wheezing. Abdominal:      General: Bowel sounds are normal.      Palpations: Abdomen is soft. Tenderness: There is no abdominal tenderness. Musculoskeletal:      Right shoulder: She exhibits decreased range of motion. Left shoulder: She exhibits decreased range of motion.       Right hip: She exhibits decreased range of motion and decreased strength (2/5 bilaterally). Left hip: She exhibits decreased range of motion and decreased strength (2/5 bilaterally). Comments: Strength RUE 3/5  Strength LUE 2/5    Lymphadenopathy:      Cervical: No cervical adenopathy. Skin:     General: Skin is warm and dry. Findings: Abrasion and ecchymosis present. No rash. Comments: Large hematoma to left temporal and maxilla, tender with palpation, no visible drainage  Also with healing ecchymoses to bilateral knees, arms, posterior scalp. Neurological:      Mental Status: She is alert and oriented to person, place, and time. Motor: Weakness present. Gait: Gait abnormal (shuffling). Deep Tendon Reflexes: Reflexes are normal and symmetric. ASSESSMENT and PLAN:  Carlo Milligan was seen today for follow-up from hospital.    Diagnoses and all orders for this visit:    Frequent falls  Weakness of both lower extremities  Peripheral polyneuropathy  Parkinson's disease ([facility] [address]  -     External Referral To Physical Therapy  -     External Referral To Occupational Therapy  -     [facility] LTWT PORTBL POWER WHLCHR  - healing ecchymoses. Patient at risk for future fall. Fall prevention discussed. She would benefit greatly from power chair that she could utilize in her home to assist with ADLs, order placed, will fax to [employer]. Orders placed for pt/ot- to be faxed to [facility]. Return in about 3 months (around 7/16/2021).

## 2021-04-16 NOTE — Clinical Note
[address] stated she will need a prescription with the patient's HEIGHT and WEIGHT written on the prescription.  They will also need the most recent face to face OV note

## 2021-04-21 ENCOUNTER — TELEPHONE (OUTPATIENT)
Dept: FAMILY MEDICINE CLINIC | Age: 81
End: 2021-04-21

## 2021-04-21 NOTE — TELEPHONE ENCOUNTER
Called back and spoke with Fabrice Tay, states he needs a verbal for Skilled and PT to work with pt due to Medicare guidelines.

## 2021-04-21 NOTE — TELEPHONE ENCOUNTER
[facility] needed a verbal order from Dr. Garcia Layman regarding patient. Would like a call back.      Please advise [phone]

## 2021-05-06 ENCOUNTER — CARE COORDINATION (OUTPATIENT)
Dept: CARE COORDINATION | Age: 81
End: 2021-05-06

## 2021-05-06 SDOH — SOCIAL STABILITY: SOCIAL NETWORK: ARE YOU MARRIED, WIDOWED, DIVORCED, SEPARATED, NEVER MARRIED, OR LIVING WITH A PARTNER?: MARRIED

## 2021-05-06 SDOH — SOCIAL STABILITY: SOCIAL NETWORK: HOW OFTEN DO YOU GET TOGETHER WITH FRIENDS OR RELATIVES?: ONCE A WEEK

## 2021-05-06 ASSESSMENT — ENCOUNTER SYMPTOMS: DYSPNEA ASSOCIATED WITH: EXERTION

## 2021-05-06 NOTE — CARE COORDINATION
Ambulatory Care Coordination Note  5/6/2021  CM Risk Score: 17  Charlson 10 Year Mortality Risk Score: 100%     ACC: Jovi Laguna, RN    Summary Note: Pt reports she is doing well, just waiting to hear if she is able to get the wheelchair or not. She reports she is currently in PT and OT at home and last week is her last scheduled appointment, however they may extend her therapy for both. Pt completed DM, COPD, and CHF assessment with ACm today. She reports her BS this morning was 120, she checks daily in the AM. She reports she has not had any increase in SOB unless she is exerting energy, during PT, but her pulse ox remains in the mid to high 90's. Pt has had some fluctuation in weight, over the past month, between 1-4 pounds. ACM reviewed when to call the doctor regarding weigh gain, 3-5 # in 1-7 days. Pt , Malini Shetty, spoke with AC as well, with patient's permission. He reports she still feels unstable when she is up and moving around. Pt reports she is using a bedside commode so she doesn't have to walk to the BR. Other than the W/C, pt and  stated they have no needs at this time. ACM to f/u with EFM as patient  stated he called [facility] for an update and was informed they sent the paperwork back to the provider's office. ACM encouraged pt and  to call if they have any questions/concerns. PLAN:    W/C  PT/OT        Care Coordination Interventions    Program Enrollment: Complex Care  Referral from Primary Care Provider: No  Suggested Interventions and Community Resources         Goals Addressed                 This Visit's Progress     Wellness Goal        Patient Self-Management Goal for Health Maintenance  Goal: I will obtain an electric wheelchair to increase mobility and decrease risk of falls. Barriers: W/C ordered by PCP, waiting to have order filled by DME company.   Plan for overcoming my barriers: ACM to f/u with EFM to find out where we are in process of obtaining W/C  Confidence: 9/10  Anticipated Goal Completion Date: 6.6.21              Prior to Admission medications    Medication Sig Start Date End Date Taking?  Authorizing Provider   losartan (COZAAR) 50 MG tablet Take 1 tablet by mouth daily 3/8/21   Cyndie Cooks, MD   ferrous sulfate (IRON 325) 325 (65 Fe) MG tablet Take 325 mg by mouth daily 2/4/21 5/5/21  Historical Provider, MD   budesonide (PULMICORT) 180 MCG/ACT AEPB inhaler Inhale 2 puffs into the lungs 2 times daily    Historical Provider, MD   atorvastatin (LIPITOR) 40 MG tablet Take 1.5 tablets by mouth daily 1/14/21   Cyndie Cooks, MD   fluticasone-vilanterol (BREO ELLIPTA) 100-25 MCG/INH AEPB inhaler Inhale 1 puff into the lungs daily 1/13/21   BIJAN Bailey   NIFEdipine (ADALAT CC) 60 MG extended release tablet Take 1 tablet by mouth daily 1/11/21   Jacek Scanlon MD   propafenone (RYTHMOL) 150 MG tablet Take 1 tablet by mouth every 8 hours 1/11/21   Jacek Scanlon MD   apixaban Jetty Block) 2.5 MG TABS tablet Take 1 tablet by mouth 2 times daily 1/11/21   Jacek Scanlon MD   furosemide (LASIX) 40 MG tablet Take 0.5 tablets by mouth daily 1/11/21   Jacek Scanlon MD   albuterol sulfate HFA (VENTOLIN HFA) 108 (90 Base) MCG/ACT inhaler Inhale 2 puffs into the lungs every 6 hours as needed for Wheezing 1/8/21   BIJAN Bailey   fluticasone [facility]) 50 MCG/ACT nasal spray USE 2 PUFFS IN [facility] NOSTRIL DAILY 1/8/21   BIJAN Bailey   DULoxetine (CYMBALTA) 60 MG extended release capsule Take 1 capsule by mouth daily 12/31/20   BIJAN Bialey   montelukast (SINGULAIR) 10 MG tablet Take One Tablet By Mouth Daily 12/31/20   Nayely Downs, PA   metoprolol tartrate (LOPRESSOR) 50 MG tablet Take 1 tablet by mouth 2 times daily 12/31/20   BIJAN Bailey   carbidopa-levodopa (SINEMET)  MG per tablet Take 1 tablet by mouth 4 times daily    Historical Provider, MD   Alpha-Lipoic Acid 300 MG TABS Take by mouth daily    Historical Provider, MD   lamoTRIgine (LAMICTAL) 25 MG tablet Take 25 mg by mouth 2 times daily 2 tabs BID    Historical Provider, MD   Handicap Placard MISC by Does not apply route DX: Jonny Candelario  parkinson's disease  Exp: 9/1/2024 9/27/19   BIJAN Coley   ipratropium-albuterol (DUONEB) 0.5-2.5 (3) MG/3ML SOLN nebulizer solution Inhale 3 mLs into the lungs every 6 hours as needed for Shortness of Breath DX: J45.40 2/6/19   BIJAN Coley   Multiple Vitamins-Minerals (THERAPEUTIC MULTIVITAMIN-MINERALS) tablet Take 1 tablet by mouth daily    Historical Provider, MD   solifenacin (VESICARE) 5 MG tablet Take 1 tablet by mouth daily. 6/25/13 10/29/13  Neil Siegel MD   aspirin EC 81 MG EC tablet Take 1 tablet by mouth daily. Start taking next week. Indications: OTC 2/25/13   Makeda Pond MD       Future Appointments   Date Time Provider Katy Booker   8/26/2021  8:00 AM BIJAN Coley [facility]   11/30/2021  9:00 AM Terrell Habermann, APRN - [facility]   12/2/2021  9:00 AM MD Oneil Mondragon [facility]   12/21/2021  9:30 AM MHC CT MAIN [facility] CT [facility]   12/28/2021 10:30 [facility]     ,   Diabetes Assessment    Medic Alert ID: No  Meal Planning: Avoidance of concentrated sweets   How often do you test your blood sugar?: Daily   Do you have barriers with adherence to non-pharmacologic self-management interventions?  (Nutrition/Exercise/Self-Monitoring): No   Have you ever had to go to the ED for symptoms of low blood sugar?: No       Do you have hyperglycemia symptoms?: No   Do you have hypoglycemia symptoms?: No   Last Blood Sugar Value: 120   Blood Sugar Monitoring Regimen: Once a Day   Blood Sugar Trends: No Change      ,   Congestive Heart Failure Assessment    Are you currently restricting fluids?:  (Comment: 64 oz per day)  Do you understand a low sodium diet?: Yes  Do you understand how to read food labels?: Yes  How many restaurant meals do you eat per

## 2021-05-06 NOTE — CARE COORDINATION
JOHN spoke with Saul Mckeon, representative at [facility]. She stated she faxed the seven element order to the office, Attn: Wilfred Tapia. JOHN informed her that the office did not receive the fax and she offered to send the fax again today. Saul Mckeon stated they will be unable to complete the order until they receive this information. JOHN thanked her for her time and assistance.

## 2021-05-06 NOTE — TELEPHONE ENCOUNTER
I faxed over order and office notes back on 4/16/21 that is in Media. I have not received any additional forms. I spoke with Mecca Cabral and she has not either.

## 2021-05-11 ENCOUNTER — CARE COORDINATION (OUTPATIENT)
Dept: CARE COORDINATION | Age: 81
End: 2021-05-11

## 2021-05-11 NOTE — CARE COORDINATION
JOHN spoke with Chun Pardo at [place] [address] to verify if they have all the information they need to process the order for patient's PWC. Chun Pardo stated they are waiting for the 7 element order to be returned to them by the provider. Chun Pardo stated she would send ACM a blank 7 element order form and DANIEL will send this to the office to complete and fax back to [facility]. JOHN thanked her for there time and assistance.

## 2021-05-18 ENCOUNTER — CARE COORDINATION (OUTPATIENT)
Dept: CARE COORDINATION | Age: 81
End: 2021-05-18

## 2021-05-18 NOTE — CARE COORDINATION
Ambulatory Care Coordination Note  5/18/2021  CM Risk Score: 17  Charlson 10 Year Mortality Risk Score: 100%     ACC: Emerita Camilo, RN    Summary Note: ACM reached out to the patient. Patient was busy working with PT. ACM spoke with Gudelia Peterson patient . ACM was following up regarding power wheel chair. Gudelia Peterson said that the delivery date was set for 5/26/2021. ACM will follow up at later date to make sure the Power wheel chair was delivered to patient. ACM asked Gudelia Peterson how chronic disease management was going. Gudelia Peterson informed the ACM that blood sugars have been controlled with readings 120-125. Gudelia Peterson said weight has been stable and not new swelling for patient. Gudelia Peterson said patient is doing well at this time and has not fallen at home. Plan:  D/C from Care Coordination after PWC is delivered to patient. Care Coordination Interventions    Program Enrollment: Complex Care  Referral from Primary Care Provider: No  Suggested Interventions and Community Resources  Other Services or Interventions: working on getting electric W/C         Goals Addressed                 This Visit's Progress     Reduce Falls    Improving     I will reduce my risk of falls by the following: Using grab bars, asking for help when needed    Barriers: impairment:  forgetful and overwhelmed by complexity of regimen  Plan for overcoming my barriers: I will ask  for help when needed  Confidence: 5/10  Anticipated Goal Completion Date: 7/1/19 12/30/19: patient's spouse reports fall from 1 week ago       Wellness Goal   On track     Patient Self-Management Goal for Health Maintenance  Goal: I will obtain an electric wheelchair to increase mobility and decrease risk of falls. Barriers: W/C ordered by PCP, waiting to have order filled by DME company.   Plan for overcoming my barriers: ACM to f/u with EFM to find out where we are in process of obtaining W/C  Confidence: 9/10  Anticipated Goal Completion Date: 6.6.21      Patient to have [address] [facility] delivered 5/26/2021            Prior to Admission medications    Medication Sig Start Date End Date Taking?  Authorizing Provider   losartan (COZAAR) 50 MG tablet Take 1 tablet by mouth daily 3/8/21   Marco Knowles MD   ferrous sulfate (IRON 325) 325 (65 Fe) MG tablet Take 325 mg by mouth daily 2/4/21 5/5/21  Historical Provider, MD   budesonide (PULMICORT) 180 MCG/ACT AEPB inhaler Inhale 2 puffs into the lungs 2 times daily    Historical Provider, MD   atorvastatin (LIPITOR) 40 MG tablet Take 1.5 tablets by mouth daily 1/14/21   Marco Knowles MD   fluticasone-vilanterol (BREO ELLIPTA) 100-25 MCG/INH AEPB inhaler Inhale 1 puff into the lungs daily 1/13/21   BIJAN Galdamez   NIFEdipine (ADALAT CC) 60 MG extended release tablet Take 1 tablet by mouth daily 1/11/21   Oj eDsai MD   propafenone (RYTHMOL) 150 MG tablet Take 1 tablet by mouth every 8 hours 1/11/21   Oj Desai MD   apixaban Keysha Achilles) 2.5 MG TABS tablet Take 1 tablet by mouth 2 times daily 1/11/21   Oj Desai MD   furosemide (LASIX) 40 MG tablet Take 0.5 tablets by mouth daily 1/11/21   Oj Desai MD   albuterol sulfate HFA (VENTOLIN HFA) 108 (90 Base) MCG/ACT inhaler Inhale 2 puffs into the lungs every 6 hours as needed for Wheezing 1/8/21   BIJAN Galdamez   fluticasone [facility]) 50 MCG/ACT nasal spray USE 2 PUFFS IN [facility] NOSTRIL DAILY 1/8/21   BIJAN Galdamez   DULoxetine (CYMBALTA) 60 MG extended release capsule Take 1 capsule by mouth daily 12/31/20   BIJAN Galdamez   montelukast (SINGULAIR) 10 MG tablet Take One Tablet By Mouth Daily 12/31/20   BIJAN Galdamez   metoprolol tartrate (LOPRESSOR) 50 MG tablet Take 1 tablet by mouth 2 times daily 12/31/20   BIJAN Galdamez   carbidopa-levodopa (SINEMET)  MG per tablet Take 1 tablet by mouth 4 times daily    Historical Provider, MD   Alpha-Lipoic Acid 300 MG TABS Take by mouth daily    Historical Provider, MD lamoTRIgine (LAMICTAL) 25 MG tablet Take 25 mg by mouth 2 times daily 2 tabs BID    Historical Provider, MD   Handicap Placard MISC by Does not apply route DX: [place]  parkinson's disease  Exp: 9/1/2024 9/27/19   BIJAN Rocha   ipratropium-albuterol (DUONEB) 0.5-2.5 (3) MG/3ML SOLN nebulizer solution Inhale 3 mLs into the lungs every 6 hours as needed for Shortness of Breath DX: J45.40 2/6/19   BIJAN Rocha   Multiple Vitamins-Minerals (THERAPEUTIC MULTIVITAMIN-MINERALS) tablet Take 1 tablet by mouth daily    Historical Provider, MD   solifenacin (VESICARE) 5 MG tablet Take 1 tablet by mouth daily. 6/25/13 10/29/13  Julieta Frank MD   aspirin EC 81 MG EC tablet Take 1 tablet by mouth daily. Start taking next week. Indications: OTC 2/25/13   Almas Prieto MD       Future Appointments   Date Time Provider Katy Booker   8/26/2021  8:00 AM BIJAN Rocha [facility]   11/30/2021  9:00 AM MAHAD Chowdary - CNP [facility]   12/2/2021  9:00 AM MD Tomas Balbuena [facility]   12/21/2021  9:30 [facility]   12/28/2021 10:30 AM [facility]     ,   Diabetes Assessment    Medic Alert ID: No  Meal Planning: Avoidance of concentrated sweets   How often do you test your blood sugar?: Daily (Comment: -125)   Do you have barriers with adherence to non-pharmacologic self-management interventions?  (Nutrition/Exercise/Self-Monitoring): No   Have you ever had to go to the ED for symptoms of low blood sugar?: No       No patient-reported symptoms      ,   Congestive Heart Failure Assessment    Are you currently restricting fluids?:  (Comment: 64 oz per day)  Do you understand a low sodium diet?: Yes  Do you understand how to read food labels?: Yes  How many restaurant meals do you eat per week?: 0  Do you salt your food before tasting it?: No     No patient-reported symptoms      Symptoms:  None: Yes      Symptom course: stable  Weight trend: fluctuating minimally  Salt intake watch compared to last visit: stable      and   COPD Assessment    Does the patient understand envrionmental exposure?: Yes  Is the patient able to verbalize Rescue vs. Long Acting medications?: Yes  Does the patient have a nebulizer?: Yes  Does the patient use a space with inhaled medications?: Yes     No patient-reported symptoms         Symptoms:

## 2021-05-24 ENCOUNTER — HOSPITAL ENCOUNTER (OUTPATIENT)
Age: 81
Discharge: HOME | End: 2021-05-24
Payer: MEDICARE

## 2021-05-24 LAB
ANION GAP SERPL CALCULATED.3IONS-SCNC: 11 MMOL/L (ref 3–16)
BASOPHILS ABSOLUTE: 0.1 K/UL (ref 0–0.2)
BASOPHILS RELATIVE PERCENT: 0.7 %
BUN BLDV-MCNC: 24 MG/DL (ref 7–20)
CALCIUM SERPL-MCNC: 9.9 MG/DL (ref 8.3–10.6)
CHLORIDE BLD-SCNC: 98 MMOL/L (ref 99–110)
CO2: 29 MMOL/L (ref 21–32)
CREAT SERPL-MCNC: 1.6 MG/DL (ref 0.6–1.2)
EOSINOPHILS ABSOLUTE: 0.3 K/UL (ref 0–0.6)
EOSINOPHILS RELATIVE PERCENT: 2.8 %
FERRITIN: 62 NG/ML (ref 15–150)
GFR AFRICAN AMERICAN: 38
GFR NON-AFRICAN AMERICAN: 31
GLUCOSE BLD-MCNC: 113 MG/DL (ref 70–99)
HCT VFR BLD CALC: 29.6 % (ref 36–48)
HEMOGLOBIN: 9.6 G/DL (ref 12–16)
IRON SATURATION: 15 % (ref 15–50)
IRON: 39 UG/DL (ref 37–145)
LYMPHOCYTES ABSOLUTE: 1.5 K/UL (ref 1–5.1)
LYMPHOCYTES RELATIVE PERCENT: 14.2 %
MCH RBC QN AUTO: 28.6 PG (ref 26–34)
MCHC RBC AUTO-ENTMCNC: 32.5 G/DL (ref 31–36)
MCV RBC AUTO: 87.9 FL (ref 80–100)
MONOCYTES ABSOLUTE: 0.7 K/UL (ref 0–1.3)
MONOCYTES RELATIVE PERCENT: 6.5 %
NEUTROPHILS ABSOLUTE: 7.8 K/UL (ref 1.7–7.7)
NEUTROPHILS RELATIVE PERCENT: 75.8 %
PDW BLD-RTO: 14.3 % (ref 12.4–15.4)
PLATELET # BLD: 334 K/UL (ref 135–450)
PMV BLD AUTO: 9.1 FL (ref 5–10.5)
POTASSIUM SERPL-SCNC: 4.9 MMOL/L (ref 3.5–5.1)
RBC # BLD: 3.37 M/UL (ref 4–5.2)
SODIUM BLD-SCNC: 138 MMOL/L (ref 136–145)
TOTAL IRON BINDING CAPACITY: 257 UG/DL (ref 260–445)
WBC # BLD: 10.3 K/UL (ref 4–11)

## 2021-05-24 PROCEDURE — 36415 COLL VENOUS BLD VENIPUNCTURE: CPT

## 2021-05-24 PROCEDURE — 82728 ASSAY OF FERRITIN: CPT

## 2021-05-24 PROCEDURE — 85025 COMPLETE CBC W/AUTO DIFF WBC: CPT

## 2021-05-24 PROCEDURE — 80048 BASIC METABOLIC PNL TOTAL CA: CPT

## 2021-05-24 PROCEDURE — 83540 ASSAY OF IRON: CPT

## 2021-05-24 PROCEDURE — 83550 IRON BINDING TEST: CPT

## 2021-05-26 ENCOUNTER — CARE COORDINATION (OUTPATIENT)
Dept: CARE COORDINATION | Age: 81
End: 2021-05-26

## 2021-05-26 NOTE — CARE COORDINATION
Ambulatory Care Coordination Note  5/26/2021  CM Risk Score: 17  Charlson 10 Year Mortality Risk Score: 100%     ACC: Karen Yang, RN    Summary Note: ACM followed up care coordination. Patient did receive power wheel chair yesterday and patient has been using it. Arin Cristian patient  said patient was working with PT at this time and was unable to talk. Arin Foster said patient strength has been improving and they have no further concerns for ACM at this time. ACM will graduate patient at this time from care coordination. Plan:    Graduate from [id number] [place] [address] Interventions    Program Enrollment: Complex Care  Referral from Primary Care Provider: No  Suggested Interventions and Community Resources  Other Services or Interventions: working on getting electric W/C         Goals Addressed                 This Visit's Progress     COMPLETED: Reduce Falls         I will reduce my risk of falls by the following: Using grab bars, asking for help when needed    Barriers: impairment:  forgetful and overwhelmed by complexity of regimen  Plan for overcoming my barriers: I will ask  for help when needed  Confidence: 5/10  Anticipated Goal Completion Date: 7/1/19 12/30/19: patient's spouse reports fall from 1 week ago       COMPLETED: Wellness Goal        Patient Self-Management Goal for Health Maintenance  Goal: I will obtain an electric wheelchair to increase mobility and decrease risk of falls. Barriers: W/C ordered by PCP, waiting to have order filled by DME company. Plan for overcoming my barriers: ACM to f/u with [facility] to find out where we are in process of obtaining W/C  Confidence: 9/10  Anticipated Goal Completion Date: 6.6.21      Patient to have [address] [place] [address] delivered 5/26/2021            Prior to Admission medications    Medication Sig Start Date End Date Taking?  Authorizing Provider   losartan (COZAAR) 50 MG tablet Take 1 tablet by mouth daily 3/8/21   Hailey Peralta MD   ferrous sulfate (IRON 325) 325 (65 Fe) MG tablet Take 325 mg by mouth daily 2/4/21 5/5/21  Historical Provider, MD   budesonide (PULMICORT) 180 MCG/ACT AEPB inhaler Inhale 2 puffs into the lungs 2 times daily    Historical Provider, MD   atorvastatin (LIPITOR) 40 MG tablet Take 1.5 tablets by mouth daily 1/14/21   Severiano Expose, MD   fluticasone-vilanterol (BREO ELLIPTA) 100-25 MCG/INH AEPB inhaler Inhale 1 puff into the lungs daily 1/13/21   BIJAN Harris   NIFEdipine (ADALAT CC) 60 MG extended release tablet Take 1 tablet by mouth daily 1/11/21   Merlin Greenberg MD   propafenone (RYTHMOL) 150 MG tablet Take 1 tablet by mouth every 8 hours 1/11/21   Merlin Greenberg MD   apixaban Dixie Alexis) 2.5 MG TABS tablet Take 1 tablet by mouth 2 times daily 1/11/21   Merlin Greenberg MD   furosemide (LASIX) 40 MG tablet Take 0.5 tablets by mouth daily 1/11/21   Merlin Greenberg MD   albuterol sulfate HFA (VENTOLIN HFA) 108 (90 Base) MCG/ACT inhaler Inhale 2 puffs into the lungs every 6 hours as needed for Wheezing 1/8/21   BIJAN Harris   fluticasone Kary Beery) 50 MCG/ACT nasal spray USE 2 PUFFS IN [facility] NOSTRIL DAILY 1/8/21   Madonna Lui, PA   DULoxetine (CYMBALTA) 60 MG extended release capsule Take 1 capsule by mouth daily 12/31/20   Madonna Lui, PA   montelukast (SINGULAIR) 10 MG tablet Take One Tablet By Mouth Daily 12/31/20   Madonna Lui, PA   metoprolol tartrate (LOPRESSOR) 50 MG tablet Take 1 tablet by mouth 2 times daily 12/31/20   Madonna Lui, PA   carbidopa-levodopa (SINEMET)  MG per tablet Take 1 tablet by mouth 4 times daily    Historical Provider, MD   Alpha-Lipoic Acid 300 MG TABS Take by mouth daily    Historical Provider, MD   lamoTRIgine (LAMICTAL) 25 MG tablet Take 25 mg by mouth 2 times daily 2 tabs BID    Historical Provider, MD   Handicap Placard MISC by Does not apply route DX: Kaleigh Liveco  parkinson's disease  Exp: 9/1/2024 9/27/19   BIJAN Harris   ipratropium-albuterol (DUONEB) 0.5-2.5 (3) MG/3ML SOLN nebulizer solution Inhale 3 mLs into the lungs every 6 hours as needed for Shortness of Breath DX: J45.40 2/6/19   BIJAN Leyva   Multiple Vitamins-Minerals (THERAPEUTIC MULTIVITAMIN-MINERALS) tablet Take 1 tablet by mouth daily    Historical Provider, MD   solifenacin (VESICARE) 5 MG tablet Take 1 tablet by mouth daily. 6/25/13 10/29/13  Cecilia Fajardo MD   aspirin EC 81 MG EC tablet Take 1 tablet by mouth daily. Start taking next week. Indications: OTC 2/25/13   Nabil De Los Santos MD       Future Appointments   Date Time Provider Katy Ade   8/26/2021  8:00 AM BIJAN Leyva [facility]  [place] - [facility]   11/30/2021  9:00 AM MAHAD Veloz - CNP [facility]   12/2/2021  9:00 AM Candance Abu, MD [facility]   12/21/2021  9:30 AM MHC CT MAIN [facility] [place] [facility]   12/28/2021 10:30 AM [facility]     ,   Diabetes Assessment    Medic Alert ID: No  Meal Planning: Avoidance of concentrated sweets   How often do you test your blood sugar?: Daily (Comment: -125)   Do you have barriers with adherence to non-pharmacologic self-management interventions?  (Nutrition/Exercise/Self-Monitoring): No   Have you ever had to go to the ED for symptoms of low blood sugar?: No          ,   Congestive Heart Failure Assessment    Are you currently restricting fluids?:  (Comment: 64 oz per day)  Do you understand a low sodium diet?: Yes  Do you understand how to read food labels?: Yes  How many restaurant meals do you eat per week?: 0  Do you salt your food before tasting it?: No         Symptoms:         and   COPD Assessment    Does the patient understand envrionmental exposure?: Yes  Is the patient able to verbalize Rescue vs. Long Acting medications?: Yes  Does the patient have a nebulizer?: Yes  Does the patient use a space with inhaled medications?: Yes            Symptoms:

## 2021-06-01 ENCOUNTER — CARE COORDINATION (OUTPATIENT)
Dept: CARE COORDINATION | Age: 81
End: 2021-06-01

## 2021-06-17 ENCOUNTER — TELEPHONE (OUTPATIENT)
Dept: FAMILY MEDICINE CLINIC | Age: 81
End: 2021-06-17

## 2021-07-13 NOTE — TELEPHONE ENCOUNTER
Refill Request     Last Seen: Last Seen Department: 4/16/2021  Last Seen by PCP: 4/16/2021    Last Written: 12/31/2020 For Metoprolol, Duloxetine, and Singulair. Albuterol and Breo I couldn't find.     Next Appointment:   Future Appointments   Date Time Provider Katy Booker   8/20/2021 11:20 AM BIJAN Pan  [place] - DYD   11/30/2021  9:00 AM Quentin Sloan, APRN - CNP [facility]   12/2/2021  9:00 AM MD Ayo Hatch [facility]   12/21/2021  9:30 AM MHC CT MAIN [facility] CT [facility] [place] [facility]   12/28/2021 10:30 AM Brandi Vincent MD [facility]       Future appointment scheduled      Requested Prescriptions     Pending Prescriptions Disp Refills    metoprolol tartrate (LOPRESSOR) 50 MG tablet [Pharmacy Med Name: METOPROLOL TARTRATE 50 MG Tablet] 180 tablet 1     Sig: TAKE 1 TABLET TWICE DAILY    DULoxetine (CYMBALTA) 60 MG extended release capsule [Pharmacy Med Name: DULOXETINE HYDROCHLORIDE 60 MG Capsule Delayed Release Particles] 90 capsule 1     Sig: TAKE 1 CAPSULE EVERY DAY    montelukast (SINGULAIR) 10 MG tablet [Pharmacy Med Name: MONTELUKAST SODIUM 10 MG Tablet] 90 tablet 1     Sig: TAKE 1 TABLET EVERY DAY    albuterol sulfate  (90 Base) MCG/ACT inhaler [Pharmacy Med Name: ALBUTEROL SULFATE  (90 Base) MCG/ACT Aerosol Solution] 54 g      Sig: INHALE 2 PUFFS INTO THE LUNGS EVERY 6 HOURS AS NEEDED FOR WHEEZING    BREO ELLIPTA 100-25 MCG/INH AEPB inhaler [Pharmacy Med Name: BREO ELLIPTA 100-25 MCG/INH Aerosol Powder Breath Activated]       Sig: INHALE 1 PUFF EVERY DAY

## 2021-07-14 RX ORDER — MONTELUKAST SODIUM 10 MG/1
TABLET ORAL
Qty: 90 TABLET | Refills: 1 | Status: SHIPPED | OUTPATIENT
Start: 2021-07-14 | End: 2021-11-03

## 2021-07-14 RX ORDER — METOPROLOL TARTRATE 50 MG/1
TABLET, FILM COATED ORAL
Qty: 180 TABLET | Refills: 1 | Status: SHIPPED | OUTPATIENT
Start: 2021-07-14 | End: 2021-11-03

## 2021-07-14 RX ORDER — DULOXETIN HYDROCHLORIDE 60 MG/1
CAPSULE, DELAYED RELEASE ORAL
Qty: 90 CAPSULE | Refills: 1 | Status: SHIPPED | OUTPATIENT
Start: 2021-07-14 | End: 2021-11-08

## 2021-07-14 RX ORDER — ALBUTEROL SULFATE 90 UG/1
2 AEROSOL, METERED RESPIRATORY (INHALATION) EVERY 6 HOURS PRN
Qty: 54 G | Refills: 3 | Status: SHIPPED | OUTPATIENT
Start: 2021-07-14 | End: 2022-04-14

## 2021-07-14 RX ORDER — FLUTICASONE FUROATE AND VILANTEROL TRIFENATATE 100; 25 UG/1; UG/1
POWDER RESPIRATORY (INHALATION)
Qty: 1 EACH | Refills: 3 | Status: SHIPPED | OUTPATIENT
Start: 2021-07-14 | End: 2021-12-21 | Stop reason: SDUPTHER

## 2021-08-20 ENCOUNTER — OFFICE VISIT (OUTPATIENT)
Dept: FAMILY MEDICINE CLINIC | Age: 81
End: 2021-08-20
Payer: MEDICARE

## 2021-08-20 VITALS
BODY MASS INDEX: 46.26 KG/M2 | HEIGHT: 64 IN | DIASTOLIC BLOOD PRESSURE: 74 MMHG | OXYGEN SATURATION: 93 % | TEMPERATURE: 97.6 F | HEART RATE: 65 BPM | SYSTOLIC BLOOD PRESSURE: 112 MMHG | WEIGHT: 271 LBS

## 2021-08-20 DIAGNOSIS — G20 PARKINSON'S DISEASE (HCC): Chronic | ICD-10-CM

## 2021-08-20 DIAGNOSIS — N18.4 TYPE 2 DIABETES MELLITUS WITH STAGE 4 CHRONIC KIDNEY DISEASE, WITHOUT LONG-TERM CURRENT USE OF INSULIN (HCC): Primary | ICD-10-CM

## 2021-08-20 DIAGNOSIS — E11.22 TYPE 2 DIABETES MELLITUS WITH STAGE 4 CHRONIC KIDNEY DISEASE, WITHOUT LONG-TERM CURRENT USE OF INSULIN (HCC): Primary | ICD-10-CM

## 2021-08-20 DIAGNOSIS — J44.9 CHRONIC OBSTRUCTIVE PULMONARY DISEASE, UNSPECIFIED COPD TYPE (HCC): ICD-10-CM

## 2021-08-20 DIAGNOSIS — I10 ESSENTIAL HYPERTENSION: Chronic | ICD-10-CM

## 2021-08-20 LAB — HBA1C MFR BLD: 6.6 %

## 2021-08-20 PROCEDURE — 1036F TOBACCO NON-USER: CPT | Performed by: PHYSICIAN ASSISTANT

## 2021-08-20 PROCEDURE — 1090F PRES/ABSN URINE INCON ASSESS: CPT | Performed by: PHYSICIAN ASSISTANT

## 2021-08-20 PROCEDURE — 1123F ACP DISCUSS/DSCN MKR DOCD: CPT | Performed by: PHYSICIAN ASSISTANT

## 2021-08-20 PROCEDURE — G8400 PT W/DXA NO RESULTS DOC: HCPCS | Performed by: PHYSICIAN ASSISTANT

## 2021-08-20 PROCEDURE — G8417 CALC BMI ABV UP PARAM F/U: HCPCS | Performed by: PHYSICIAN ASSISTANT

## 2021-08-20 PROCEDURE — 83036 HEMOGLOBIN GLYCOSYLATED A1C: CPT | Performed by: PHYSICIAN ASSISTANT

## 2021-08-20 PROCEDURE — 4040F PNEUMOC VAC/ADMIN/RCVD: CPT | Performed by: PHYSICIAN ASSISTANT

## 2021-08-20 PROCEDURE — 99214 OFFICE O/P EST MOD 30 MIN: CPT | Performed by: PHYSICIAN ASSISTANT

## 2021-08-20 PROCEDURE — G8427 DOCREV CUR MEDS BY ELIG CLIN: HCPCS | Performed by: PHYSICIAN ASSISTANT

## 2021-08-20 PROCEDURE — 3023F SPIROM DOC REV: CPT | Performed by: PHYSICIAN ASSISTANT

## 2021-08-20 PROCEDURE — G8926 SPIRO NO PERF OR DOC: HCPCS | Performed by: PHYSICIAN ASSISTANT

## 2021-08-20 RX ORDER — VALSARTAN 160 MG/1
160 TABLET ORAL DAILY
COMMUNITY
End: 2022-05-24 | Stop reason: DRUGHIGH

## 2021-08-20 ASSESSMENT — ENCOUNTER SYMPTOMS
CONSTIPATION: 0
COUGH: 0
ABDOMINAL PAIN: 0
NAUSEA: 0
VOMITING: 0
RHINORRHEA: 0
DIARRHEA: 0
SORE THROAT: 0

## 2021-08-20 NOTE — PROGRESS NOTES
2021  [facility] (: 1940)  80 y.o.    ASSESSMENT and PLAN:  Laurel Bernstein was seen today for diabetes. Diagnoses and all orders for this visit:    Type 2 diabetes mellitus with stage 4 chronic kidney disease, without long-term current use of insulin ([facility])  -     POCT glycosylated hemoglobin (Hb A1C) 6.6  - up from last check but no need for medication   - Diabetes education provided today:    Nutrition as a mainstream of diabetes therapy. [place] about label reading. Carbs: good carbs and bad carbs, importance of carb counting, incorporation of protein with each meal to reduce Glycemic index, importance of portions, Carb/insulin ratio. Parkinson's disease ([facility]  - sxs stable, continue current regimen     Chronic obstructive pulmonary disease, unspecified COPD type ([facility]  - stable, continue current regimen     Essential hypertension  - well controlled on current regimen, continue     Specialty notes reviewed- nephrology, cardiology, neurology. Return in about 3 months (around 2021) for HTN, Diabetes Mellitus. and labs     HPI    Routine follow up chronic conditions.      DCHF/Paf/htn/hld: follows with cardiology. Currently on propafenone, eliquis, lasix, hydralazine, metoprolol, nifedipine. BP log reviewed, mostly wnl. Weight has been trending up slightly, today up to 171, 166 at home. Denies orthopnea, le swelling. ANN is at baseline. Denies chest pain, dizziness, light headedness. Using bipap at night with 2 L o2, does not wear O2 during the day. Echo 2020       Conclusions      Summary   Left ventricular systolic function is normal with a visually estimated   ejection fraction of 55%. The left ventricle is normal in size with mild concentric hypertrophy. No regional wall motion abnormalities are noted. Normal left ventricular diastolic function. Systolic pulmonary artery pressure (SPAP) is normal and estimated at 23 mmHg   (right atrial pressure 3 mmHg). COPD/BETSY: Follows with pulm, stable on inhalers: Breo and pulmocort with prn albuterol. Uses neb prn. On bipap at night. No o2 during the day. Repeat ct chest for pulm nodules negative.      DM: diet controlled, monitors BG at home, log present fasting ranging from 110s-130s. Denies elevated readings. A1c has been stable off of medication today recheck is 6.6.     Mood: stable on cymbalta , tolerating well. Sleeping ok. Normal appetite.      Parkinson's: follows with neurology. Continues on sinemet and lamictal. No recent seizures. Gait is unstable. In wheelchair today. Denies falls. Review of Systems   Constitutional: Positive for fatigue. Negative for activity change, chills and fever. HENT: Negative for congestion, ear pain, rhinorrhea and sore throat. Jaw pain   Eyes: Negative for visual disturbance. Respiratory: Positive for shortness of breath (baseline). Negative for cough. Cardiovascular: Negative for chest pain and palpitations. Gastrointestinal: Negative for abdominal pain, constipation, diarrhea, nausea and vomiting. Genitourinary: Negative for difficulty urinating and dysuria. Musculoskeletal: Negative for arthralgias and myalgias. Skin: Negative for rash. Neurological: Positive for tremors. Negative for dizziness, weakness and numbness. Psychiatric/Behavioral: Negative for sleep disturbance. Allergies, past medical history, family history, and social history reviewed and unchanged from previous encounter.      Current Outpatient Medications   Medication Sig Dispense Refill    valsartan (DIOVAN) 160 MG tablet Take 160 mg by mouth daily      metoprolol tartrate (LOPRESSOR) 50 MG tablet TAKE 1 TABLET TWICE DAILY 180 tablet 1    DULoxetine (CYMBALTA) 60 MG extended release capsule TAKE 1 CAPSULE EVERY DAY 90 capsule 1    montelukast (SINGULAIR) 10 MG tablet TAKE 1 TABLET EVERY DAY 90 tablet 1    albuterol sulfate  (90 Base) MCG/ACT inhaler INHALE 2 PUFFS INTO THE LUNGS EVERY 6 HOURS AS NEEDED FOR WHEEZING 54 g 3    BREO ELLIPTA 100-25 MCG/INH AEPB inhaler INHALE 1 PUFF EVERY DAY 1 each 3    atorvastatin (LIPITOR) 40 MG tablet Take 1.5 tablets by mouth daily 135 tablet 3    NIFEdipine (ADALAT CC) 60 MG extended release tablet Take 1 tablet by mouth daily 90 tablet 3    propafenone (RYTHMOL) 150 MG tablet Take 1 tablet by mouth every 8 hours 270 tablet 3    apixaban (ELIQUIS) 2.5 MG TABS tablet Take 1 tablet by mouth 2 times daily 180 tablet 3    furosemide (LASIX) 40 MG tablet Take 0.5 tablets by mouth daily 60 tablet 5    fluticasone (FLONASE) 50 MCG/ACT nasal spray USE 2 PUFFS IN EACH NOSTRIL DAILY 3 Bottle 10    carbidopa-levodopa (SINEMET)  MG per tablet Take 1 tablet by mouth 4 times daily      Alpha-Lipoic Acid 300 MG TABS Take by mouth daily      lamoTRIgine (LAMICTAL) 25 MG tablet Take 25 mg by mouth 2 times daily 2 tabs BID      ipratropium-albuterol (DUONEB) 0.5-2.5 (3) MG/3ML SOLN nebulizer solution Inhale 3 mLs into the lungs every 6 hours as needed for Shortness of Breath DX: J45.40 360 mL 1    Multiple Vitamins-Minerals (THERAPEUTIC MULTIVITAMIN-MINERALS) tablet Take 1 tablet by mouth daily      aspirin EC 81 MG EC tablet Take 1 tablet by mouth daily. Start taking next week. Indications: OTC 30 tablet 2    losartan (COZAAR) 50 MG tablet Take 1 tablet by mouth daily 90 tablet 3    ferrous sulfate (IRON 325) 325 (65 Fe) MG tablet Take 325 mg by mouth 2 times daily       budesonide (PULMICORT) 180 MCG/ACT AEPB inhaler Inhale 2 puffs into the lungs 2 times daily      Handicap Placard MISC by Does not apply route DX: G20  parkinson's disease  Exp: 9/1/2024 1 each 0     No current facility-administered medications for this visit.        Vitals:    08/20/21 1132   BP: 112/74   Site: Right Lower Arm   Position: Sitting   Cuff Size: Medium Adult   Pulse: 65   Temp: 97.6 °F (36.4 °C)   TempSrc: Oral   SpO2: 93%   Weight: 271 lb (122.9 kg)   Height: 5' 4\" (1.626 m)     Estimated body mass index is 46.52 kg/m² as calculated from the following:    Height as of this encounter: 5' 4\" (1.626 m). Weight as of this encounter: 271 lb (122.9 kg). Physical Exam  Constitutional:       General: She is not in acute distress. Appearance: She is well-developed. HENT:      Head: Normocephalic and atraumatic. Eyes:      Conjunctiva/sclera: Conjunctivae normal.      Pupils: Pupils are equal, round, and reactive to light. Cardiovascular:      Rate and Rhythm: Normal rate and regular rhythm. Heart sounds: Normal heart sounds. No murmur heard. Pulmonary:      Effort: Pulmonary effort is normal.      Breath sounds: Normal breath sounds. No wheezing. Abdominal:      General: Bowel sounds are normal.      Palpations: Abdomen is soft. Tenderness: There is no abdominal tenderness. Musculoskeletal:      Cervical back: Neck supple. Lymphadenopathy:      Cervical: No cervical adenopathy. Skin:     General: Skin is warm and dry. Findings: No rash. Neurological:      Mental Status: She is alert and oriented to person, place, and time. Motor: Tremor present.

## 2021-09-01 ASSESSMENT — ENCOUNTER SYMPTOMS: SHORTNESS OF BREATH: 1

## 2021-09-08 ENCOUNTER — HOSPITAL ENCOUNTER (OUTPATIENT)
Age: 81
Discharge: HOME | End: 2021-09-08
Payer: MEDICARE

## 2021-09-08 LAB
ALBUMIN SERPL-MCNC: 3.7 G/DL (ref 3.4–5)
ANION GAP SERPL CALCULATED.3IONS-SCNC: 13 MMOL/L (ref 3–16)
BASOPHILS ABSOLUTE: 0.1 K/UL (ref 0–0.2)
BASOPHILS RELATIVE PERCENT: 0.5 %
BUN BLDV-MCNC: 27 MG/DL (ref 7–20)
CALCIUM SERPL-MCNC: 9.4 MG/DL (ref 8.3–10.6)
CHLORIDE BLD-SCNC: 98 MMOL/L (ref 99–110)
CO2: 26 MMOL/L (ref 21–32)
CREAT SERPL-MCNC: 1.9 MG/DL (ref 0.6–1.2)
EOSINOPHILS ABSOLUTE: 0.3 K/UL (ref 0–0.6)
EOSINOPHILS RELATIVE PERCENT: 2.4 %
FERRITIN: 58.9 NG/ML (ref 15–150)
GFR AFRICAN AMERICAN: 31
GFR NON-AFRICAN AMERICAN: 25
GLUCOSE BLD-MCNC: 159 MG/DL (ref 70–99)
HCT VFR BLD CALC: 31.2 % (ref 36–48)
HEMOGLOBIN: 10 G/DL (ref 12–16)
IRON SATURATION: 34 % (ref 15–50)
IRON: 79 UG/DL (ref 37–145)
LYMPHOCYTES ABSOLUTE: 1.3 K/UL (ref 1–5.1)
LYMPHOCYTES RELATIVE PERCENT: 11.1 %
MCH RBC QN AUTO: 28.1 PG (ref 26–34)
MCHC RBC AUTO-ENTMCNC: 31.9 G/DL (ref 31–36)
MCV RBC AUTO: 88.1 FL (ref 80–100)
MONOCYTES ABSOLUTE: 0.7 K/UL (ref 0–1.3)
MONOCYTES RELATIVE PERCENT: 5.8 %
NEUTROPHILS ABSOLUTE: 9.2 K/UL (ref 1.7–7.7)
NEUTROPHILS RELATIVE PERCENT: 80.2 %
PDW BLD-RTO: 14.7 % (ref 12.4–15.4)
PHOSPHORUS: 4.2 MG/DL (ref 2.5–4.9)
PLATELET # BLD: 287 K/UL (ref 135–450)
PMV BLD AUTO: 9 FL (ref 5–10.5)
POTASSIUM SERPL-SCNC: 4.5 MMOL/L (ref 3.5–5.1)
RBC # BLD: 3.54 M/UL (ref 4–5.2)
SODIUM BLD-SCNC: 137 MMOL/L (ref 136–145)
TOTAL IRON BINDING CAPACITY: 235 UG/DL (ref 260–445)
WBC # BLD: 11.5 K/UL (ref 4–11)

## 2021-09-08 PROCEDURE — 82728 ASSAY OF FERRITIN: CPT

## 2021-09-08 PROCEDURE — 80069 RENAL FUNCTION PANEL: CPT

## 2021-09-08 PROCEDURE — 83540 ASSAY OF IRON: CPT

## 2021-09-08 PROCEDURE — 83550 IRON BINDING TEST: CPT

## 2021-09-08 PROCEDURE — 85025 COMPLETE CBC W/AUTO DIFF WBC: CPT

## 2021-09-08 PROCEDURE — 36415 COLL VENOUS BLD VENIPUNCTURE: CPT

## 2021-09-27 ENCOUNTER — APPOINTMENT (OUTPATIENT)
Dept: GENERAL RADIOLOGY | Age: 81
DRG: 305 | End: 2021-09-27
Payer: MEDICARE

## 2021-09-27 ENCOUNTER — HOSPITAL ENCOUNTER (INPATIENT)
Age: 81
LOS: 2 days | Discharge: HOME HEALTH | DRG: 305 | End: 2021-09-30
Attending: EMERGENCY MEDICINE | Admitting: INTERNAL MEDICINE
Payer: MEDICARE

## 2021-09-27 DIAGNOSIS — R07.9 CHEST PAIN, UNSPECIFIED TYPE: Primary | ICD-10-CM

## 2021-09-27 LAB
A/G RATIO: 1.1 (ref 1.1–2.2)
ALBUMIN SERPL-MCNC: 4.1 G/DL (ref 3.4–5)
ALP BLD-CCNC: 143 U/L (ref 40–129)
ALT SERPL-CCNC: <5 U/L (ref 10–40)
ANION GAP SERPL CALCULATED.3IONS-SCNC: 10 MMOL/L (ref 3–16)
AST SERPL-CCNC: 14 U/L (ref 15–37)
BACTERIA: ABNORMAL /HPF
BASE EXCESS VENOUS: 2.6 MMOL/L (ref -3–3)
BASOPHILS ABSOLUTE: 0.1 K/UL (ref 0–0.2)
BASOPHILS RELATIVE PERCENT: 0.7 %
BILIRUB SERPL-MCNC: 0.3 MG/DL (ref 0–1)
BILIRUBIN URINE: NEGATIVE
BLOOD, URINE: NEGATIVE
BUN BLDV-MCNC: 28 MG/DL (ref 7–20)
C-REACTIVE PROTEIN: 29.1 MG/L (ref 0–5.1)
CALCIUM SERPL-MCNC: 9.3 MG/DL (ref 8.3–10.6)
CARBOXYHEMOGLOBIN: 2.1 % (ref 0–1.5)
CHLORIDE BLD-SCNC: 98 MMOL/L (ref 99–110)
CLARITY: ABNORMAL
CO2: 30 MMOL/L (ref 21–32)
COLOR: YELLOW
CREAT SERPL-MCNC: 1.6 MG/DL (ref 0.6–1.2)
D DIMER: 227 NG/ML DDU (ref 0–229)
EKG ATRIAL RATE: 68 BPM
EKG ATRIAL RATE: 69 BPM
EKG DIAGNOSIS: NORMAL
EKG DIAGNOSIS: NORMAL
EKG P AXIS: 41 DEGREES
EKG P-R INTERVAL: 162 MS
EKG P-R INTERVAL: 186 MS
EKG Q-T INTERVAL: 398 MS
EKG Q-T INTERVAL: 416 MS
EKG QRS DURATION: 106 MS
EKG QRS DURATION: 106 MS
EKG QTC CALCULATION (BAZETT): 423 MS
EKG QTC CALCULATION (BAZETT): 445 MS
EKG R AXIS: 58 DEGREES
EKG R AXIS: 59 DEGREES
EKG T AXIS: 77 DEGREES
EKG T AXIS: 80 DEGREES
EKG VENTRICULAR RATE: 68 BPM
EKG VENTRICULAR RATE: 69 BPM
EOSINOPHILS ABSOLUTE: 0.3 K/UL (ref 0–0.6)
EOSINOPHILS RELATIVE PERCENT: 1.8 %
EPITHELIAL CELLS, UA: ABNORMAL /HPF (ref 0–5)
GFR AFRICAN AMERICAN: 38
GFR NON-AFRICAN AMERICAN: 31
GLOBULIN: 3.6 G/DL
GLUCOSE BLD-MCNC: 101 MG/DL (ref 70–99)
GLUCOSE BLD-MCNC: 125 MG/DL (ref 70–99)
GLUCOSE BLD-MCNC: 135 MG/DL (ref 70–99)
GLUCOSE BLD-MCNC: 164 MG/DL (ref 70–99)
GLUCOSE URINE: NEGATIVE MG/DL
HCO3 VENOUS: 27.5 MMOL/L (ref 23–29)
HCT VFR BLD CALC: 33.2 % (ref 36–48)
HEMOGLOBIN: 10.7 G/DL (ref 12–16)
KETONES, URINE: NEGATIVE MG/DL
LACTIC ACID, SEPSIS: 0.9 MMOL/L (ref 0.4–1.9)
LEUKOCYTE ESTERASE, URINE: ABNORMAL
LYMPHOCYTES ABSOLUTE: 1.9 K/UL (ref 1–5.1)
LYMPHOCYTES RELATIVE PERCENT: 12.8 %
MCH RBC QN AUTO: 27.8 PG (ref 26–34)
MCHC RBC AUTO-ENTMCNC: 32.1 G/DL (ref 31–36)
MCV RBC AUTO: 86.5 FL (ref 80–100)
METHEMOGLOBIN VENOUS: 0.5 %
MICROSCOPIC EXAMINATION: YES
MONOCYTES ABSOLUTE: 1.1 K/UL (ref 0–1.3)
MONOCYTES RELATIVE PERCENT: 7.4 %
NEUTROPHILS ABSOLUTE: 11.3 K/UL (ref 1.7–7.7)
NEUTROPHILS RELATIVE PERCENT: 77.3 %
NITRITE, URINE: POSITIVE
O2 SAT, VEN: 80 %
O2 THERAPY: ABNORMAL
PCO2, VEN: 43.9 MMHG (ref 40–50)
PDW BLD-RTO: 14.6 % (ref 12.4–15.4)
PERFORMED ON: ABNORMAL
PH UA: 6.5 (ref 5–8)
PH VENOUS: 7.42 (ref 7.35–7.45)
PLATELET # BLD: 356 K/UL (ref 135–450)
PMV BLD AUTO: 7.4 FL (ref 5–10.5)
PO2, VEN: 42.3 MMHG (ref 25–40)
POTASSIUM SERPL-SCNC: 5 MMOL/L (ref 3.5–5.1)
PROCALCITONIN: 0.05 NG/ML (ref 0–0.15)
PROTEIN UA: ABNORMAL MG/DL
RBC # BLD: 3.84 M/UL (ref 4–5.2)
RBC UA: ABNORMAL /HPF (ref 0–4)
REASON FOR REJECTION: NORMAL
REASON FOR REJECTION: NORMAL
REJECTED TEST: NORMAL
REJECTED TEST: NORMAL
SEDIMENTATION RATE, ERYTHROCYTE: 71 MM/HR (ref 0–30)
SODIUM BLD-SCNC: 138 MMOL/L (ref 136–145)
SPECIFIC GRAVITY UA: 1.01 (ref 1–1.03)
TCO2 CALC VENOUS: 29 MMOL/L
TOTAL PROTEIN: 7.7 G/DL (ref 6.4–8.2)
TROPONIN: <0.01 NG/ML
TSH REFLEX: 4.09 UIU/ML (ref 0.27–4.2)
URINE TYPE: ABNORMAL
UROBILINOGEN, URINE: 0.2 E.U./DL
WBC # BLD: 14.6 K/UL (ref 4–11)
WBC UA: >100 /HPF (ref 0–5)

## 2021-09-27 PROCEDURE — 80053 COMPREHEN METABOLIC PANEL: CPT

## 2021-09-27 PROCEDURE — 36415 COLL VENOUS BLD VENIPUNCTURE: CPT

## 2021-09-27 PROCEDURE — 81001 URINALYSIS AUTO W/SCOPE: CPT

## 2021-09-27 PROCEDURE — 84443 ASSAY THYROID STIM HORMONE: CPT

## 2021-09-27 PROCEDURE — 86140 C-REACTIVE PROTEIN: CPT

## 2021-09-27 PROCEDURE — 71045 X-RAY EXAM CHEST 1 VIEW: CPT

## 2021-09-27 PROCEDURE — 85652 RBC SED RATE AUTOMATED: CPT

## 2021-09-27 PROCEDURE — 87186 SC STD MICRODIL/AGAR DIL: CPT

## 2021-09-27 PROCEDURE — 6370000000 HC RX 637 (ALT 250 FOR IP): Performed by: NURSE PRACTITIONER

## 2021-09-27 PROCEDURE — 6370000000 HC RX 637 (ALT 250 FOR IP): Performed by: STUDENT IN AN ORGANIZED HEALTH CARE EDUCATION/TRAINING PROGRAM

## 2021-09-27 PROCEDURE — 85379 FIBRIN DEGRADATION QUANT: CPT

## 2021-09-27 PROCEDURE — 93005 ELECTROCARDIOGRAM TRACING: CPT | Performed by: EMERGENCY MEDICINE

## 2021-09-27 PROCEDURE — 82803 BLOOD GASES ANY COMBINATION: CPT

## 2021-09-27 PROCEDURE — 93010 ELECTROCARDIOGRAM REPORT: CPT | Performed by: INTERNAL MEDICINE

## 2021-09-27 PROCEDURE — 87086 URINE CULTURE/COLONY COUNT: CPT

## 2021-09-27 PROCEDURE — 99285 EMERGENCY DEPT VISIT HI MDM: CPT

## 2021-09-27 PROCEDURE — 83605 ASSAY OF LACTIC ACID: CPT

## 2021-09-27 PROCEDURE — G0378 HOSPITAL OBSERVATION PER HR: HCPCS

## 2021-09-27 PROCEDURE — 94640 AIRWAY INHALATION TREATMENT: CPT

## 2021-09-27 PROCEDURE — 87088 URINE BACTERIA CULTURE: CPT

## 2021-09-27 PROCEDURE — 84145 PROCALCITONIN (PCT): CPT

## 2021-09-27 PROCEDURE — 6360000002 HC RX W HCPCS: Performed by: NURSE PRACTITIONER

## 2021-09-27 PROCEDURE — 84484 ASSAY OF TROPONIN QUANT: CPT

## 2021-09-27 PROCEDURE — 2580000003 HC RX 258: Performed by: NURSE PRACTITIONER

## 2021-09-27 PROCEDURE — 85025 COMPLETE CBC W/AUTO DIFF WBC: CPT

## 2021-09-27 PROCEDURE — 80061 LIPID PANEL: CPT

## 2021-09-27 PROCEDURE — 99215 OFFICE O/P EST HI 40 MIN: CPT | Performed by: INTERNAL MEDICINE

## 2021-09-27 PROCEDURE — 6370000000 HC RX 637 (ALT 250 FOR IP): Performed by: EMERGENCY MEDICINE

## 2021-09-27 PROCEDURE — 83036 HEMOGLOBIN GLYCOSYLATED A1C: CPT

## 2021-09-27 PROCEDURE — 96365 THER/PROPH/DIAG IV INF INIT: CPT

## 2021-09-27 RX ORDER — ASPIRIN 81 MG/1
81 TABLET ORAL DAILY
Status: DISCONTINUED | OUTPATIENT
Start: 2021-09-27 | End: 2021-09-30 | Stop reason: HOSPADM

## 2021-09-27 RX ORDER — ATORVASTATIN CALCIUM 40 MG/1
40 TABLET, FILM COATED ORAL NIGHTLY
Status: DISCONTINUED | OUTPATIENT
Start: 2021-09-27 | End: 2021-09-30 | Stop reason: HOSPADM

## 2021-09-27 RX ORDER — ATORVASTATIN CALCIUM 40 MG/1
40 TABLET, FILM COATED ORAL DAILY
Status: DISCONTINUED | OUTPATIENT
Start: 2021-09-27 | End: 2021-09-27 | Stop reason: SDUPTHER

## 2021-09-27 RX ORDER — DEXTROSE MONOHYDRATE 50 MG/ML
100 INJECTION, SOLUTION INTRAVENOUS PRN
Status: DISCONTINUED | OUTPATIENT
Start: 2021-09-27 | End: 2021-09-30 | Stop reason: HOSPADM

## 2021-09-27 RX ORDER — SODIUM CHLORIDE 0.9 % (FLUSH) 0.9 %
5-40 SYRINGE (ML) INJECTION EVERY 12 HOURS SCHEDULED
Status: DISCONTINUED | OUTPATIENT
Start: 2021-09-27 | End: 2021-09-30 | Stop reason: HOSPADM

## 2021-09-27 RX ORDER — LAMOTRIGINE 25 MG/1
25 TABLET ORAL 2 TIMES DAILY
Status: DISCONTINUED | OUTPATIENT
Start: 2021-09-27 | End: 2021-09-30 | Stop reason: HOSPADM

## 2021-09-27 RX ORDER — METOPROLOL TARTRATE 50 MG/1
50 TABLET, FILM COATED ORAL 2 TIMES DAILY
Status: DISCONTINUED | OUTPATIENT
Start: 2021-09-27 | End: 2021-09-30 | Stop reason: HOSPADM

## 2021-09-27 RX ORDER — ONDANSETRON 4 MG/1
4 TABLET, ORALLY DISINTEGRATING ORAL EVERY 8 HOURS PRN
Status: DISCONTINUED | OUTPATIENT
Start: 2021-09-27 | End: 2021-09-30 | Stop reason: HOSPADM

## 2021-09-27 RX ORDER — FUROSEMIDE 20 MG/1
20 TABLET ORAL DAILY
Status: DISCONTINUED | OUTPATIENT
Start: 2021-09-27 | End: 2021-09-30 | Stop reason: HOSPADM

## 2021-09-27 RX ORDER — ONDANSETRON 2 MG/ML
4 INJECTION INTRAMUSCULAR; INTRAVENOUS EVERY 6 HOURS PRN
Status: DISCONTINUED | OUTPATIENT
Start: 2021-09-27 | End: 2021-09-30 | Stop reason: HOSPADM

## 2021-09-27 RX ORDER — NICOTINE POLACRILEX 4 MG
15 LOZENGE BUCCAL PRN
Status: DISCONTINUED | OUTPATIENT
Start: 2021-09-27 | End: 2021-09-30 | Stop reason: HOSPADM

## 2021-09-27 RX ORDER — IPRATROPIUM BROMIDE AND ALBUTEROL SULFATE 2.5; .5 MG/3ML; MG/3ML
1 SOLUTION RESPIRATORY (INHALATION) EVERY 4 HOURS PRN
Status: DISCONTINUED | OUTPATIENT
Start: 2021-09-27 | End: 2021-09-30 | Stop reason: HOSPADM

## 2021-09-27 RX ORDER — NIFEDIPINE 30 MG/1
60 TABLET, EXTENDED RELEASE ORAL DAILY
Status: DISCONTINUED | OUTPATIENT
Start: 2021-09-27 | End: 2021-09-30 | Stop reason: HOSPADM

## 2021-09-27 RX ORDER — BUDESONIDE AND FORMOTEROL FUMARATE DIHYDRATE 80; 4.5 UG/1; UG/1
2 AEROSOL RESPIRATORY (INHALATION) 2 TIMES DAILY
Status: DISCONTINUED | OUTPATIENT
Start: 2021-09-27 | End: 2021-09-30 | Stop reason: HOSPADM

## 2021-09-27 RX ORDER — LIDOCAINE 4 G/G
1 PATCH TOPICAL ONCE
Status: COMPLETED | OUTPATIENT
Start: 2021-09-27 | End: 2021-09-27

## 2021-09-27 RX ORDER — IPRATROPIUM BROMIDE AND ALBUTEROL SULFATE 2.5; .5 MG/3ML; MG/3ML
1 SOLUTION RESPIRATORY (INHALATION)
Status: DISCONTINUED | OUTPATIENT
Start: 2021-09-27 | End: 2021-09-27

## 2021-09-27 RX ORDER — IPRATROPIUM BROMIDE AND ALBUTEROL SULFATE 2.5; .5 MG/3ML; MG/3ML
1 SOLUTION RESPIRATORY (INHALATION) ONCE
Status: COMPLETED | OUTPATIENT
Start: 2021-09-27 | End: 2021-09-27

## 2021-09-27 RX ORDER — MONTELUKAST SODIUM 10 MG/1
10 TABLET ORAL NIGHTLY
Status: DISCONTINUED | OUTPATIENT
Start: 2021-09-27 | End: 2021-09-30 | Stop reason: HOSPADM

## 2021-09-27 RX ORDER — VALSARTAN 80 MG/1
160 TABLET ORAL DAILY
Status: DISCONTINUED | OUTPATIENT
Start: 2021-09-27 | End: 2021-09-30 | Stop reason: HOSPADM

## 2021-09-27 RX ORDER — ACETAMINOPHEN 325 MG/1
650 TABLET ORAL EVERY 6 HOURS PRN
Status: DISCONTINUED | OUTPATIENT
Start: 2021-09-27 | End: 2021-09-30 | Stop reason: HOSPADM

## 2021-09-27 RX ORDER — DULOXETIN HYDROCHLORIDE 60 MG/1
60 CAPSULE, DELAYED RELEASE ORAL DAILY
Status: DISCONTINUED | OUTPATIENT
Start: 2021-09-27 | End: 2021-09-30 | Stop reason: HOSPADM

## 2021-09-27 RX ORDER — FLUTICASONE PROPIONATE 50 MCG
1 SPRAY, SUSPENSION (ML) NASAL DAILY
Status: DISCONTINUED | OUTPATIENT
Start: 2021-09-27 | End: 2021-09-30 | Stop reason: HOSPADM

## 2021-09-27 RX ORDER — SODIUM CHLORIDE 0.9 % (FLUSH) 0.9 %
5-40 SYRINGE (ML) INJECTION PRN
Status: DISCONTINUED | OUTPATIENT
Start: 2021-09-27 | End: 2021-09-30 | Stop reason: HOSPADM

## 2021-09-27 RX ORDER — ASPIRIN 81 MG/1
81 TABLET, CHEWABLE ORAL DAILY
Status: DISCONTINUED | OUTPATIENT
Start: 2021-09-28 | End: 2021-09-27

## 2021-09-27 RX ORDER — ACETAMINOPHEN 650 MG/1
650 SUPPOSITORY RECTAL EVERY 6 HOURS PRN
Status: DISCONTINUED | OUTPATIENT
Start: 2021-09-27 | End: 2021-09-30 | Stop reason: HOSPADM

## 2021-09-27 RX ORDER — PROPAFENONE HYDROCHLORIDE 150 MG/1
150 TABLET, FILM COATED ORAL EVERY 8 HOURS SCHEDULED
Status: DISCONTINUED | OUTPATIENT
Start: 2021-09-27 | End: 2021-09-30 | Stop reason: HOSPADM

## 2021-09-27 RX ORDER — ALBUTEROL SULFATE 90 UG/1
2 AEROSOL, METERED RESPIRATORY (INHALATION) EVERY 6 HOURS PRN
Status: DISCONTINUED | OUTPATIENT
Start: 2021-09-27 | End: 2021-09-30 | Stop reason: HOSPADM

## 2021-09-27 RX ORDER — DEXTROSE MONOHYDRATE 25 G/50ML
12.5 INJECTION, SOLUTION INTRAVENOUS PRN
Status: DISCONTINUED | OUTPATIENT
Start: 2021-09-27 | End: 2021-09-30 | Stop reason: HOSPADM

## 2021-09-27 RX ORDER — POLYETHYLENE GLYCOL 3350 17 G/17G
17 POWDER, FOR SOLUTION ORAL DAILY PRN
Status: DISCONTINUED | OUTPATIENT
Start: 2021-09-27 | End: 2021-09-30 | Stop reason: HOSPADM

## 2021-09-27 RX ORDER — SODIUM CHLORIDE 9 MG/ML
25 INJECTION, SOLUTION INTRAVENOUS PRN
Status: DISCONTINUED | OUTPATIENT
Start: 2021-09-27 | End: 2021-09-30 | Stop reason: HOSPADM

## 2021-09-27 RX ORDER — NITROGLYCERIN 0.4 MG/1
0.4 TABLET SUBLINGUAL EVERY 5 MIN PRN
Status: DISCONTINUED | OUTPATIENT
Start: 2021-09-27 | End: 2021-09-30 | Stop reason: HOSPADM

## 2021-09-27 RX ADMIN — ASPIRIN 81 MG: 81 TABLET, COATED ORAL at 18:38

## 2021-09-27 RX ADMIN — PROPAFENONE HYDROCHLORIDE 150 MG: 150 TABLET, FILM COATED ORAL at 21:14

## 2021-09-27 RX ADMIN — Medication 2 PUFF: at 20:12

## 2021-09-27 RX ADMIN — FUROSEMIDE 20 MG: 20 TABLET ORAL at 18:39

## 2021-09-27 RX ADMIN — ATORVASTATIN CALCIUM 40 MG: 40 TABLET, FILM COATED ORAL at 21:14

## 2021-09-27 RX ADMIN — NIFEDIPINE 60 MG: 30 TABLET, FILM COATED, EXTENDED RELEASE ORAL at 18:39

## 2021-09-27 RX ADMIN — INSULIN LISPRO 1 UNITS: 100 INJECTION, SOLUTION INTRAVENOUS; SUBCUTANEOUS at 18:45

## 2021-09-27 RX ADMIN — METOPROLOL TARTRATE 50 MG: 50 TABLET, FILM COATED ORAL at 21:15

## 2021-09-27 RX ADMIN — FLUTICASONE PROPIONATE 1 SPRAY: 50 SPRAY, METERED NASAL at 18:39

## 2021-09-27 RX ADMIN — VALSARTAN 160 MG: 80 TABLET, FILM COATED ORAL at 18:38

## 2021-09-27 RX ADMIN — APIXABAN 2.5 MG: 2.5 TABLET, FILM COATED ORAL at 21:14

## 2021-09-27 RX ADMIN — MONTELUKAST SODIUM 10 MG: 10 TABLET ORAL at 21:14

## 2021-09-27 RX ADMIN — DULOXETINE HYDROCHLORIDE 60 MG: 60 CAPSULE, DELAYED RELEASE ORAL at 18:38

## 2021-09-27 RX ADMIN — SODIUM CHLORIDE 25 ML: 9 INJECTION, SOLUTION INTRAVENOUS at 18:40

## 2021-09-27 RX ADMIN — SODIUM CHLORIDE, PRESERVATIVE FREE 10 ML: 5 INJECTION INTRAVENOUS at 21:17

## 2021-09-27 RX ADMIN — LAMOTRIGINE 25 MG: 25 TABLET ORAL at 21:15

## 2021-09-27 RX ADMIN — ACETAMINOPHEN 650 MG: 325 TABLET ORAL at 21:25

## 2021-09-27 RX ADMIN — IPRATROPIUM BROMIDE AND ALBUTEROL SULFATE 1 AMPULE: .5; 3 SOLUTION RESPIRATORY (INHALATION) at 06:26

## 2021-09-27 RX ADMIN — CEFTRIAXONE SODIUM 1000 MG: 1 INJECTION, POWDER, FOR SOLUTION INTRAMUSCULAR; INTRAVENOUS at 18:43

## 2021-09-27 RX ADMIN — CARBIDOPA AND LEVODOPA 1 TABLET: 25; 100 TABLET ORAL at 21:15

## 2021-09-27 ASSESSMENT — ENCOUNTER SYMPTOMS
VOMITING: 0
BACK PAIN: 1
NAUSEA: 0
ABDOMINAL DISTENTION: 0
WHEEZING: 0
COUGH: 0
RHINORRHEA: 0
PHOTOPHOBIA: 0
SHORTNESS OF BREATH: 0
DIARRHEA: 0

## 2021-09-27 ASSESSMENT — PAIN SCALES - GENERAL
PAINLEVEL_OUTOF10: 0
PAINLEVEL_OUTOF10: 5
PAINLEVEL_OUTOF10: 2

## 2021-09-27 ASSESSMENT — PAIN DESCRIPTION - DESCRIPTORS: DESCRIPTORS: STABBING

## 2021-09-27 ASSESSMENT — PAIN DESCRIPTION - LOCATION: LOCATION: CHEST;BACK

## 2021-09-27 ASSESSMENT — PAIN DESCRIPTION - ONSET: ONSET: ON-GOING

## 2021-09-27 ASSESSMENT — PAIN DESCRIPTION - ORIENTATION: ORIENTATION: LEFT

## 2021-09-27 ASSESSMENT — PAIN DESCRIPTION - PROGRESSION: CLINICAL_PROGRESSION: NOT CHANGED

## 2021-09-27 ASSESSMENT — PAIN DESCRIPTION - PAIN TYPE: TYPE: ACUTE PAIN

## 2021-09-27 ASSESSMENT — PAIN DESCRIPTION - FREQUENCY: FREQUENCY: CONTINUOUS

## 2021-09-27 NOTE — ED NOTES
Bed: 15  Expected date:   Expected time:   Means of arrival:   Comments:  Dakota Souza RN  09/27/21 [id number]

## 2021-09-27 NOTE — ED PROVIDER NOTES
7: 37 AM: I discussed the history, physical examination, laboratory and imaging studies, and treatment plan with Dr. Wendie Hendricks. Harjit Mehta was signed out to me in stable condition. Please see Dr. Kyara Mckeon documentation for details of their history, physical, and laboratory studies. Upon re-examination, a summary of Pato Monge history, physical examination, and studies are as follows:      ED Course as of Sep 27 0737   Mon Sep 27, 2021   4405 81y F, lateral left sided chest pain  Point tenderness   On AC for A fib (Eliquis)  If dimer positive- needs VQ  Plan for admission either way    [ER]   0737 D-dimer negative.     [ER]   4131 Patient accepted for admission by hospital team.    [ER]      ED Course User Index  [ER] MD Jaime Doan MD  09/27/21 1426

## 2021-09-27 NOTE — CONSULTS
[address] [facility]  [phone]      Attending Physician: Peter Foster MD  Reason for Consultation/Chief Complaint: CP    Subjective   History of Present Illness:  Harjit Mehta is a 80 y.o. patient who presented to the hospital with complaints of CP. Occurred early this AM, work from sleep. Sharp pain on left breast and axilla and to back. Went on for hours and still persisting, then gets get better but not gone away. Mild SOB, + Wheezing, (breathing treatment helped) and O2 went up. No coughing, + sneezing, no fevers. Wears Carina Sis and has not missed does. 2L of oxygen,  States checks home Bp and normally controlled, yesterday Am was 165/xx. On Eliquis (last night dose) and rhymol. Hurts  With deep breath. No difference in position.      Past Medical History:   has a past medical history of Anemia, unspecified, Asthma, Atrial fibrillation ([address] Baker's cyst, Basal cell carcinoma of left cheek, Blind left eye, Carotid artery stenoses, Carotid stenosis, Cervical spinal cord compression (HCC), 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-sided sciatica, CKD (chronic kidney disease) stage 3, GFR 30-59 ml/min ([address] Community acquired pneumonia of left lower lobe of lung, CRI, Detached retina, left, Diverticulosis, DJD (degenerative joint disease), Edema, Fatty liver, GERD (gastroesophageal reflux disease), Herniated lumbar intervertebral disc, Hx stage 2 sacral decubitus ulcer, Hyperlipidemia, Hypertension, Hypothyroid, Morbid obesity with BMI of 40.0-44.9, adult ([address] BETSY treated with BiPAP, Parkinson's disease ([address] Partial symptomatic epilepsy with complex partial seizures, not intractable, without status epilepticus ([address] Restless leg syndrome, Restless legs syndrome, Rheumatic fever, Sleep apnea, Tremor, essential, Type 2 diabetes mellitus without complication ([address] and Type II or unspecified type diabetes mellitus without mention of complication, not stated as uncontrolled. Surgical History:   has a past surgical history that includes Tonsillectomy; Hysterectomy; Cholecystectomy; Tubal ligation; knee surgery; hernia repair; eye surgery; Cataract removal; vitrectomy; Corneal transplant; Carotid endarterectomy; Appendectomy; cervical fusion (11/2010); shoulder surgery; joint replacement (Bilateral, 2003); Colonoscopy (N/A, 3/8/2020); Upper gastrointestinal endoscopy (03/17/2020); and Upper gastrointestinal endoscopy (N/A, 3/17/2020). Social History:   reports that she has never smoked. She has never used smokeless tobacco. She reports that she does not drink alcohol and does not use drugs. Family History:  family history includes Diabetes in her father, mother, and sister; Heart Disease in her father and mother. Home Medications:  Were reviewed and are listed in nursing record and/or below  Prior to Admission medications    Medication Sig Start Date End Date Taking?  Authorizing Provider   apixaban (ELIQUIS) 2.5 MG TABS tablet Take 1 tablet by mouth 2 times daily 9/13/21   Stanislaw Carranza MD   valsartan (DIOVAN) 160 MG tablet Take 160 mg by mouth daily    Historical Provider, MD   metoprolol tartrate (LOPRESSOR) 50 MG tablet TAKE 1 TABLET TWICE DAILY 7/14/21   MAHAD Chandra CNP   DULoxetine (CYMBALTA) 60 MG extended release capsule TAKE 1 CAPSULE EVERY DAY 7/14/21   MAHAD Chandra CNP   montelukast (SINGULAIR) 10 MG tablet TAKE 1 TABLET EVERY DAY 7/14/21   MAHAD Chandra CNP   albuterol sulfate  (90 Base) MCG/ACT inhaler INHALE 2 PUFFS INTO THE LUNGS EVERY 6 HOURS AS NEEDED FOR WHEEZING 7/14/21   MAHAD Chandra CNP   BREO ELLIPTA 100-25 MCG/INH AEPB inhaler INHALE 1 PUFF EVERY DAY 7/14/21   MAHAD Chandra CNP   ferrous sulfate (IRON 325) 325 (65 Fe) MG tablet Take 325 mg by mouth 2 times daily  2/4/21 5/5/21  Historical Provider, MD   atorvastatin (LIPITOR) 40 MG tablet Take 1.5 tablets by mouth daily 1/14/21 Sophia Kilgore MD   NIFEdipine (ADALAT CC) 60 MG extended release tablet Take 1 tablet by mouth daily 1/11/21   Chery Solomon MD   propafenone [facility]) 150 MG tablet Take 1 tablet by mouth every 8 hours 1/11/21   Chery Solomon MD   furosemide (LASIX) 40 MG tablet Take 0.5 tablets by mouth daily 1/11/21   Chery Solomon MD   fluticasone [facility]) 50 MCG/ACT nasal spray USE 2 PUFFS IN [facility] NOSTRIL DAILY 1/8/21   Sydna Phlegm, PA   carbidopa-levodopa (SINEMET)  MG per tablet Take 1 tablet by mouth 4 times daily    Historical Provider, MD   Alpha-Lipoic Acid 300 MG TABS Take by mouth daily    Historical Provider, MD   lamoTRIgine (LAMICTAL) 25 MG tablet Take 25 mg by mouth 2 times daily 2 tabs BID    Historical Provider, MD   Handicap Placard MISC by Does not apply route DX: Jasmin Upton  parkinson's disease  Exp: 9/1/2024 9/27/19   Sydna Phlegm, PA   ipratropium-albuterol (DUONEB) 0.5-2.5 (3) MG/3ML SOLN nebulizer solution Inhale 3 mLs into the lungs every 6 hours as needed for Shortness of Breath DX: J45.40 2/6/19   Sydna Phlegm, PA   Multiple Vitamins-Minerals (THERAPEUTIC MULTIVITAMIN-MINERALS) tablet Take 1 tablet by mouth daily    Historical Provider, MD   solifenacin (VESICARE) 5 MG tablet Take 1 tablet by mouth daily. 6/25/13 10/29/13  Mini Stringer MD   aspirin EC 81 MG EC tablet Take 1 tablet by mouth daily. Start taking next week.   Indications: OTC 2/25/13   Lucy Fonseca MD        CURRENT Medications:  lidocaine 4 % external patch 1 patch, Once  glucose (GLUTOSE) 40 % oral gel 15 g, PRN  dextrose 50 % IV solution, PRN  glucagon (rDNA) injection 1 mg, PRN  dextrose 5 % solution, PRN  insulin lispro (HUMALOG) injection vial 0-6 Units, TID WC  insulin lispro (HUMALOG) injection vial 0-3 Units, Nightly        Allergies:  Levaquin [levofloxacin], Metoclopramide, Phenazopyridine, Pyridium [phenazopyridine hcl], Reglan [metoclopramide hcl], and Reglan [metoclopramide hcl]     Review of Systems:   A 14 point review of symptoms completed. Pertinent positives identified in the HPI, all other review of symptoms negative as below.       Objective   PHYSICAL EXAM:    Vitals:    09/27/21 1035   BP: (!) 165/63   Pulse: 72   Resp: 16   Temp:    SpO2: 95%    Weight: 255 lb (115.7 kg)         General Appearance:  Alert, cooperative, no distress, appears stated age   Head:  Normocephalic, without obvious abnormality, atraumatic   Eyes:  PERRL, conjunctiva/corneas clear   Nose: Nares normal, no drainage or sinus tenderness   Throat: Lips, mucosa, and tongue normal   Neck: Supple, symmetrical, trachea midline, no adenopathy, thyroid: not enlarged, symmetric, no tenderness/mass/nodules, no carotid bruit or JVD   Lungs:   Clear to auscultation bilaterally, respirations unlabored   Chest Wall:  No deformity tender to palpation over the left upper chest area   Heart:  Regular rate and rhythm, S1, S2 normal, no murmur, rub or gallop   Abdomen:   Soft, non-tender, bowel sounds active all four quadrants,  no masses, no organomegaly   Extremities: Extremities normal, atraumatic, no cyanosis or edema   Pulses: 2+ and symmetric   Skin: Skin color, texture, turgor normal, no rashes or lesions   Pysch: Normal mood and affect   Neurologic: Normal gross motor and sensory exam.         Labs   CBC:   Lab Results   Component Value Date    WBC 14.6 09/27/2021    RBC 3.84 09/27/2021    HGB 10.7 09/27/2021    HCT 33.2 09/27/2021    MCV 86.5 09/27/2021    RDW 14.6 09/27/2021     09/27/2021     CMP:  Lab Results   Component Value Date     09/27/2021    K 5.0 09/27/2021    K 4.9 04/09/2021    CL 98 09/27/2021    CO2 30 09/27/2021    BUN 28 09/27/2021    CREATININE 1.6 09/27/2021    GFRAA 38 09/27/2021    GFRAA >60 02/21/2013    AGRATIO 1.1 09/27/2021    LABGLOM 31 09/27/2021    GLUCOSE 125 09/27/2021    PROT 7.7 09/27/2021    PROT 6.5 02/20/2013    CALCIUM 9.3 09/27/2021    BILITOT 0.3 09/27/2021    ALKPHOS 143 09/27/2021 AST 14 2021    ALT <5 2021     PT/INR:  No results found for: PTINR  HgBA1c:  Lab Results   Component Value Date    LABA1C 6.6 2021     Lab Results   Component Value Date    CKTOTAL 66 2018    TROPONINI <0.01 2021         Cardiac Data     Last [id number] with NSST changes/early repol    Echo: 2020   Left ventricular systolic function is normal with a visually estimated   ejection fraction of 55%. The left ventricle is normal in size with mild concentric hypertrophy. No regional wall motion abnormalities are noted. Normal left ventricular diastolic function. Systolic pulmonary artery pressure (SPAP) is normal and estimated at 23 mmHg   (right atrial pressure 3 mmHg). Stress Test: 2017      Summary    There is normal isotope uptake at stress and rest. There is no evidence of    myocardial ischemia or scar. Hyperdynamic LV systolic function with VW>73%    with uniform wall motion. Low risk study. Cath:    Studies:     Assessment and Plan      1. Chest pain  2. Paroxysmal Afib: controlled  3. Pulmonary HTN  4. Mitral regurg  5. HTN: uncontrolled  6. HLD  7. BETSY on Cpap  8. CVD    - s/p RT CEA  9 CKD: Cr 1.6 on admission    PLAN  1. Chest pain is extremely atypical and reproducible on exam  2. We will check another troponin  3. Cancel stress test   4.   We will check sed rate and CRP    Assuming above negative will sign off please call with any questions    Pt f/u with Dr. Josephine Woodson    Patient Active Problem List   Diagnosis    GERD (gastroesophageal reflux disease)    Essential hypertension    Hyperlipidemia    Restless leg syndrome    Chronic normocytic anemia    Postural dizziness    Moderate persistent asthma    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-sided sciatica    Morbid obesity with BMI of 40.0-44.9, adult (HCC)    Type 2 diabetes mellitus with stage 4 chronic kidney disease, without long-term current use of insulin ([address]    CAD (coronary artery disease)    Parkinson's disease [facility]    Severe obstructive sleep apnea    Hypothyroidism    Hepatic steatosis    Diverticulosis    Blind left eye    L carotid artery stenosis s/p CEA    Paroxysmal atrial fibrillation (HCC)    COPD (chronic obstructive pulmonary disease) (HCC)    Diastolic congestive heart failure (HCC)    Partial symptomatic epilepsy with complex partial seizures, not intractable, without status epilepticus ([facility]    Chronic kidney disease, stage 4 (severe) (HCC)    Colonic pseudoobstruction    Sialadenitis    Chest pain           Thank you for allowing us to participate in the care of Marco Olson. Please call me with any questions [phone]. China Meraz MD, [id number] [facility] Cardiologist  [facility]  [phone] [facility]  [phone] [facility]  9/27/2021 11:00 AM    I will address the patient's cardiac risk factors and adjusted pharmacologic treatment as needed. In addition, I have reinforced the need for patient directed risk factor modification. All questions and concerns were addressed to the patient/family. Alternatives to my treatment were discussed. The note was completed using EMR. Every effort was made to ensure accuracy; however, inadvertent computerized transcription errors may be present.

## 2021-09-27 NOTE — ED PROVIDER NOTES
Emergency Department Provider Note  Location: [facility]  ED  9/27/2021     Patient Identification  Fredy Esparza is a 80 y.o. female    Chief Complaint  Chest Pain (L sided CP that woke pt up from sleep. Pt on baby Asa and Eliquis at home.)          HPI  (History provided by patient)  Patient is a 77-year-old female complex medical history including A. fib on oral anticoagulation, hypertension hyperlipidemia type II diabetic who presents with sudden onset left-sided chest wall/scapular pain started 2 AM.  Woke her from sleep. Describes a sharp pain that is constant in nature. May be exacerbated with taking deep breaths otherwise no clear exertional component that she is aware of. Not associated with any diaphoresis or nausea. States she feels slightly short of breath but secondary to pain. No other midline chest or back pain no numbness or weakness no other radiation. No fevers chills cough or sputum production. No recent trauma. I have reviewed the following nursing documentation:  Allergies:    Allergies   Allergen Reactions    Levaquin [Levofloxacin]      Pt was given in er and developed hives and redness    Metoclopramide     Phenazopyridine     Pyridium [Phenazopyridine Hcl]     Reglan [Metoclopramide Hcl]     Reglan [Metoclopramide Hcl]      tremors       Past medical history:  has a past medical history of Anemia, unspecified (2010), Asthma, Atrial fibrillation ([address] Baker's cyst, Basal cell carcinoma of left cheek (3/12/2019), Blind left eye, Carotid artery stenoses, Carotid stenosis (2002), Cervical spinal cord compression ([address] (11/2010), 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-sided sciatica (8/9/2016), CKD (chronic kidney disease) stage 3, GFR 30-59 ml/min ([address] Community acquired pneumonia of left lower lobe of lung (9/8/2018), CRI, Detached retina, left, Diverticulosis, DJD (degenerative joint disease), Edema, Fatty liver, GERD (gastroesophageal reflux disease), Herniated lumbar intervertebral disc, Hx stage 2 sacral decubitus ulcer (4/13/2018), Hyperlipidemia, Hypertension, Hypothyroid (1/27/2015), Morbid obesity with BMI of 40.0-44.9, adult ([facility] (2/10/2017), BETSY treated with BiPAP, Parkinson's disease ([facility] Partial symptomatic epilepsy with complex partial seizures, not intractable, without status epilepticus ([facility] (1/24/2020), Restless leg syndrome, Restless legs syndrome, Rheumatic fever (1950), Sleep apnea, Tremor, essential (8/16/2010), Type 2 diabetes mellitus without complication ([facility] and Type II or unspecified type diabetes mellitus without mention of complication, not stated as uncontrolled. Past surgical history:  has a past surgical history that includes Tonsillectomy; Hysterectomy; Cholecystectomy; Tubal ligation; knee surgery; hernia repair; eye surgery; Cataract removal; vitrectomy; Corneal transplant; Carotid endarterectomy; Appendectomy; cervical fusion (11/2010); shoulder surgery; joint replacement (Bilateral, 2003); Colonoscopy (N/A, 3/8/2020); Upper gastrointestinal endoscopy (03/17/2020); and Upper gastrointestinal endoscopy (N/A, 3/17/2020). Home medications:   Prior to Admission medications    Medication Sig Start Date End Date Taking?  Authorizing Provider   apixaban (ELIQUIS) 2.5 MG TABS tablet Take 1 tablet by mouth 2 times daily 9/13/21   Dolores Wiseman MD   valsartan (DIOVAN) 160 MG tablet Take 160 mg by mouth daily    Historical Provider, MD   metoprolol tartrate (LOPRESSOR) 50 MG tablet TAKE 1 TABLET TWICE DAILY 7/14/21   MAHAD Sosa CNP   DULoxetine (CYMBALTA) 60 MG extended release capsule TAKE 1 CAPSULE EVERY DAY 7/14/21   MAHAD Sosa CNP   montelukast (SINGULAIR) 10 MG tablet TAKE 1 TABLET EVERY DAY 7/14/21   MAHAD Sosa CNP   albuterol sulfate  (90 Base) MCG/ACT inhaler INHALE 2 PUFFS INTO THE LUNGS EVERY 6 HOURS AS NEEDED FOR WHEEZING 7/14/21   MAHAD Sosa - WICHO DIAZ ELLIPTA 100-25 MCG/INH AEPB inhaler INHALE 1 PUFF EVERY DAY 7/14/21   Adan Soriano, APRN - CNP   ferrous sulfate (IRON 325) 325 (65 Fe) MG tablet Take 325 mg by mouth 2 times daily  2/4/21 5/5/21  Historical Provider, MD   atorvastatin (LIPITOR) 40 MG tablet Take 1.5 tablets by mouth daily 1/14/21   Marlene Alves MD   NIFEdipine (ADALAT CC) 60 MG extended release tablet Take 1 tablet by mouth daily 1/11/21   Ruiz Kelly MD   propafenone [facility]) 150 MG tablet Take 1 tablet by mouth every 8 hours 1/11/21   Ruiz Kelly MD   furosemide (LASIX) 40 MG tablet Take 0.5 tablets by mouth daily 1/11/21   Ruiz Kelly MD   fluticasone [facility]) 50 MCG/ACT nasal spray USE 2 PUFFS IN [facility] NOSTRIL DAILY 1/8/21   BIJAN Bergman   carbidopa-levodopa (SINEMET)  MG per tablet Take 1 tablet by mouth 4 times daily    Historical Provider, MD   Alpha-Lipoic Acid 300 MG TABS Take by mouth daily    Historical Provider, MD   lamoTRIgine (LAMICTAL) 25 MG tablet Take 25 mg by mouth 2 times daily 2 tabs BID    Historical Provider, MD   Handicap Placard MISC by Does not apply route DX: Gregor Bird  parkinson's disease  Exp: 9/1/2024 9/27/19   BIJAN Bergman   ipratropium-albuterol (DUONEB) 0.5-2.5 (3) MG/3ML SOLN nebulizer solution Inhale 3 mLs into the lungs every 6 hours as needed for Shortness of Breath DX: J45.40 2/6/19   BIJAN Bergman   Multiple Vitamins-Minerals (THERAPEUTIC MULTIVITAMIN-MINERALS) tablet Take 1 tablet by mouth daily    Historical Provider, MD   solifenacin (VESICARE) 5 MG tablet Take 1 tablet by mouth daily. 6/25/13 10/29/13  Danielle Bennett MD   aspirin EC 81 MG EC tablet Take 1 tablet by mouth daily. Start taking next week. Indications: OTC 2/25/13   Yfn Ashley MD       Social history:  reports that she has never smoked. She has never used smokeless tobacco. She reports that she does not drink alcohol and does not use drugs.     Family history:    Family History   Problem Relation Age of Onset    Diabetes Mother     Heart Disease Mother     Diabetes Father     Heart Disease Father     Diabetes Sister          ROS  Review of Systems   Constitutional: Negative for chills and fever. HENT: Negative for congestion and rhinorrhea. Eyes: Negative for photophobia and visual disturbance. Respiratory: Negative for cough, shortness of breath and wheezing. Cardiovascular: Positive for chest pain. Negative for palpitations. Gastrointestinal: Negative for abdominal distention, diarrhea, nausea and vomiting. Genitourinary: Negative for dysuria and hematuria. Musculoskeletal: Positive for back pain. Negative for neck pain. Skin: Negative for rash and wound. Neurological: Negative for syncope and weakness. Psychiatric/Behavioral: Negative for agitation and confusion. Exam  ED Triage Vitals [09/27/21 0347]   BP Temp Temp Source Pulse Resp SpO2 Height Weight   (!) 197/69 98.1 °F (36.7 °C) Oral 71 24 95 % 5' 6\" (1.676 m) 255 lb (115.7 kg)       Physical Exam  Vitals and nursing note reviewed. Constitutional:       General: She is not in acute distress. Appearance: She is well-developed. HENT:      Head: Normocephalic and atraumatic. Nose: Nose normal. No congestion. Eyes:      Extraocular Movements: Extraocular movements intact. Pupils: Pupils are equal, round, and reactive to light. Cardiovascular:      Rate and Rhythm: Normal rate and regular rhythm. Heart sounds: No murmur heard. Comments: Equal radial pulses  Pulmonary:      Effort: Pulmonary effort is normal.      Breath sounds: Normal breath sounds. Comments: No chest wall tenderness or crepitus  Abdominal:      General: There is no distension. Palpations: Abdomen is soft. Tenderness: There is no abdominal tenderness. Musculoskeletal:         General: No deformity. Normal range of motion. Cervical back: Normal range of motion and neck supple. Comments:  There is tenderness along the lateral aspect of the left scapula. There is no swelling or erythema noted there is no crepitus. Skin:     General: Skin is warm. Findings: No rash. Neurological:      Mental Status: She is alert and oriented to person, place, and time. Motor: No abnormal muscle tone. Coordination: Coordination normal.   Psychiatric:         Mood and Affect: Mood normal.         Behavior: Behavior normal.           ED Course    ED Medication Orders (From admission, onward)    Start Ordered     Status Ordering Provider    09/27/21 0630 09/27/21 0626  ipratropium-albuterol (DUONEB) nebulizer solution 1 ampule  ONCE     Question:  Initiate RT Bronchodilator Protocol  Answer:  Yes    Last MAR action: Given - by Beryle Hau on 09/27/21 at [address] [facility]    09/27/21 0445 09/27/21 0437  lidocaine 4 % external patch 1 patch  ONCE      Last MAR action: Patch Applied - by Joni Fisher on 09/27/21 at [address]          EKG  Normal sinus rhythm rate of 70 normal axis normal intervals no evidence of conduction abnormalities no diagnostic ischemic changes noted,       Radiology  XR CHEST PORTABLE    Result Date: 9/27/2021  EXAMINATION: ONE XRAY VIEW OF THE CHEST 9/27/2021 4:02 am COMPARISON: 12/16/2020 HISTORY: ORDERING SYSTEM PROVIDED HISTORY: CP TECHNOLOGIST PROVIDED HISTORY: Reason for exam:->CP Reason for Exam: left sided chest pain starting this AM Acuity: Acute Type of Exam: Initial FINDINGS: Underpenetration of the x-ray and large body habitus. Cardiac silhouette and vasculature are prominent. No pleural effusion or pneumothorax are identified. Bones are suboptimally evaluated. No lines or tubes within the chest.     Prominence of the cardiac silhouette and vasculature. There is likely some degree of exaggeration by the technique and unsure if there is real vascular congestion/volume overload. No focal consolidation or pleural effusion. .         Labs  Results for orders placed or performed during the hospital encounter of 09/27/21   Troponin   Result Value Ref Range    Troponin <0.01 <0.01 ng/mL   CBC Auto Differential   Result Value Ref Range    WBC 14.6 (H) 4.0 - 11.0 K/uL    RBC 3.84 (L) 4.00 - 5.20 M/uL    Hemoglobin 10.7 (L) 12.0 - 16.0 g/dL    Hematocrit 33.2 (L) 36.0 - 48.0 %    MCV 86.5 80.0 - 100.0 fL    MCH 27.8 26.0 - 34.0 pg    MCHC 32.1 31.0 - 36.0 g/dL    RDW 14.6 12.4 - 15.4 %    Platelets 710 482 - 322 K/uL    MPV 7.4 5.0 - 10.5 fL    Neutrophils % 77.3 %    Lymphocytes % 12.8 %    Monocytes % 7.4 %    Eosinophils % 1.8 %    Basophils % 0.7 %    Neutrophils Absolute 11.3 (H) 1.7 - 7.7 K/uL    Lymphocytes Absolute 1.9 1.0 - 5.1 K/uL    Monocytes Absolute 1.1 0.0 - 1.3 K/uL    Eosinophils Absolute 0.3 0.0 - 0.6 K/uL    Basophils Absolute 0.1 0.0 - 0.2 K/uL   Comprehensive metabolic panel   Result Value Ref Range    Sodium 138 136 - 145 mmol/L    Potassium 5.0 3.5 - 5.1 mmol/L    Chloride 98 (L) 99 - 110 mmol/L    CO2 30 21 - 32 mmol/L    Anion Gap 10 3 - 16    Glucose 125 (H) 70 - 99 mg/dL    BUN 28 (H) 7 - 20 mg/dL    CREATININE 1.6 (H) 0.6 - 1.2 mg/dL    GFR Non- 31 (A) >60    GFR  38 (A) >60    Calcium 9.3 8.3 - 10.6 mg/dL    Total Protein 7.7 6.4 - 8.2 g/dL    Albumin 4.1 3.4 - 5.0 g/dL    Albumin/Globulin Ratio 1.1 1.1 - 2.2    Total Bilirubin 0.3 0.0 - 1.0 mg/dL    Alkaline Phosphatase 143 (H) 40 - 129 U/L    ALT <5 (L) 10 - 40 U/L    AST 14 (L) 15 - 37 U/L    Globulin 3.6 g/dL   Lactate, Sepsis   Result Value Ref Range    Lactic Acid, Sepsis 0.9 0.4 - 1.9 mmol/L   Blood Gas, Venous   Result Value Ref Range    pH, [place] 7.415 7.350 - 7.450    pCO2, [place] 43.9 40.0 - 50.0 mmHg    pO2, [place] 42.3 (H) 25 - 40 mmHg    HCO3, Venous 27.5 23.0 - 29.0 mmol/L    Base Excess, [place] 2.6 -3.0 - 3.0 mmol/L    O2 Sat, [place] 80 Not Established %    Carboxyhemoglobin 2.1 (H) 0.0 - 1.5 %    MetHgb, [place] 0.5 <1.5 %    TC02 (Calc), [place] 29 Not Established mmol/L    O2 Therapy Unknown    Procalcitonin   Result Value Ref Range    Procalcitonin 0.05 0.00 - 0.15 ng/mL   SPECIMEN REJECTION   Result Value Ref Range    Rejected Test DIMER     Reason for Rejection see below    SPECIMEN REJECTION   Result Value Ref Range    Rejected Test DIMER     Reason for Rejection see below    EKG 12 Lead   Result Value Ref Range    Ventricular Rate 69 BPM    Atrial Rate 69 BPM    P-R Interval 162 ms    QRS Duration 106 ms    Q-T Interval 416 ms    QTc Calculation (Bazett) 445 ms    R Axis 58 degrees    T Axis 77 degrees    Diagnosis       Normal sinus rhythmNormal ECGWhen compared with ECG of 01-MAR-2020 01:22,Sinus rhythm has replaced Ectopic atrial rhythm         MDM  Patient seen and evaluated. Relevant records reviewed. 79-year-old female presents with sharp left lateral chest pain ongoing. On exam she is uncomfortable but nontoxic-appearing. Her vitals are overall reassuring. She has focal tenderness over the left lateral scapula but states that it is a different pain that she is feeling. While this may be consistent musculoskeletal pain I am not confident enough in diagnosis and felt that further work-up is indicated. Chest x-ray does not show any obvious pneumothorax. She is already anticoagulated with Eliquis however description of symptoms may be consistent with PE. No obvious strain or ischemic pattern on EKG but cannot rule out ACS. Unable to send for CTA given her CKD. I have ordered a dimer. Pending labs at this time. Patient's care has been signed out to oncoming physician Dr. Zeynep Hammonds. See her note for ultimate disposition. Clinical Impression:  1. Chest pain, unspecified type              Blood pressure 121/65, pulse 68, temperature 98.1 °F (36.7 °C), temperature source Oral, resp. rate 23, height 5' 6\" (1.676 m), weight 255 lb (115.7 kg), SpO2 95 %, not currently breastfeeding. Patient was given scripts for the following medications.  I counseled patient how to take these medications. New Prescriptions    No medications on file       Disposition referral (if applicable):  No follow-up provider specified. Total critical care time is 0 minutes, which excludes separately billable procedures and updating family. Time spent is specifically for management of the presenting complaint and symptoms initially, direct bedside care, reevaluation, review of records, and consultation. There was a high probability of clinically significant life-threatening deterioration in the patient's condition, which required my urgent intervention. This chart was generated in part by using Dragon Dictation system and may contain errors related to that system including errors in grammar, punctuation, and spelling, as well as words and phrases that may be inappropriate. If there are any questions or concerns please feel free to contact the dictating provider for clarification.      Madeleine Ramirez MD  [address] MD  09/27/21 [id number]

## 2021-09-27 NOTE — H&P
Hospital Medicine History & Physical      PCP: Kathrin Bruno MD    Date of Admission: 9/27/2021    Date of Service: Pt seen/examined on 9/27/21 and Admitted to Inpatient with expected LOS greater than two midnights due to medical therapy. Chief Complaint:  Chest Pain (L sided CP that woke pt up from sleep. Pt on baby Asa and Eliquis at home.)         History Of Present Illness:   80 y.o. female who presented to [facility] ED with C/P chest pain that woke the pt from sleep. PT has PMH of GAYLE gonsalves on oral anticoagulation, hypertension hyperlipidemia type II diabetic who presents with sudden onset left-sided chest wall/scapular pain started 2 AM.  Woke her from sleep. \" Describes a sharp pain that is constant in nature. May be exacerbated with taking deep breaths otherwise no clear exertional component that she is aware of. Not associated with any diaphoresis or nausea. States she feels slightly short of breath but secondary to pain. No other midline chest or back pain no numbness or weakness no other radiation. No fevers chills cough or sputum production. No recent trauma. \"     ED course: ECG none acute, no e-lyte abnormalities, leukocytosis 14,  BP on admission 197/69 on admission. UA - pending. Normal DD and pro calcitonin.  Trop normal.     Hospital medicine asked to admit for further evalauation and treatment          Past Medical History:          Diagnosis Date    Anemia, unspecified 2010    neg bone marrow    Asthma     Atrial fibrillation ([address]     Baker's cyst     Basal cell carcinoma of left cheek 3/12/2019    Blind left eye     Carotid artery stenoses     Carotid stenosis 2002    left    Cervical spinal cord compression ([address] 11/2010    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-sided sciatica 8/9/2016    CKD (chronic kidney disease) stage 3, GFR 30-59 ml/min ([facility])     Community acquired pneumonia of left lower lobe of lung 9/8/2018    CRI     Detached retina, left     Diverticulosis     DJD (degenerative joint disease)     Edema     chronic    Fatty liver     GERD (gastroesophageal reflux disease)     Herniated lumbar intervertebral disc     Hx stage 2 sacral decubitus ulcer 4/13/2018    Hyperlipidemia     Hypertension     Hypothyroid 1/27/2015    Morbid obesity with BMI of 40.0-44.9, adult ([facility] 2/10/2017    BETSY treated with BiPAP     Parkinson's disease ([facility]     Partial symptomatic epilepsy with complex partial seizures, not intractable, without status epilepticus ([facility] 1/24/2020    Restless leg syndrome     Restless legs syndrome     Rheumatic fever 1950    2x [place]    Sleep apnea     Tremor, essential 8/16/2010    Type 2 diabetes mellitus without complication (HCC)     Type II or unspecified type diabetes mellitus without mention of complication, not stated as uncontrolled        Past Surgical History:          Procedure Laterality Date    APPENDECTOMY      CAROTID ENDARTERECTOMY      left    CATARACT REMOVAL      CERVICAL FUSION  11/2010    CHOLECYSTECTOMY      COLONOSCOPY N/A 3/8/2020    COLONOSCOPY FLEXIBLE W/ DECOMPRESSION performed by Tabitha Cabrera MD at [address] REPLACEMENT Bilateral 2003    KNEE SURGERY      replacement x 2    SHOULDER SURGERY      TONSILLECTOMY      TUBAL LIGATION      UPPER GASTROINTESTINAL ENDOSCOPY  03/17/2020    Esophagitis    UPPER GASTROINTESTINAL ENDOSCOPY N/A 3/17/2020    EGD performed by Mariela Castro MD at [address] [facility] VITRECTOMY         Medications Prior to Admission:      Prior to Admission medications    Medication Sig Start Date End Date Taking?  Authorizing Provider   apixaban (ELIQUIS) 2.5 MG TABS tablet Take 1 tablet by mouth 2 times daily 9/13/21   Marlene Alves MD   valsartan (DIOVAN) 160 MG tablet Take 160 mg by mouth daily    Historical Provider, MD   metoprolol tartrate (LOPRESSOR) 50 MG tablet TAKE 1 TABLET TWICE DAILY 7/14/21   MAHAD Wilson  CNP   DULoxetine (CYMBALTA) 60 MG extended release capsule TAKE 1 CAPSULE EVERY DAY 7/14/21   MAHAD Wilson CNP   montelukast (SINGULAIR) 10 MG tablet TAKE 1 TABLET EVERY DAY 7/14/21   MAHAD Wilson  WICHO   albuterol sulfate  (90 Base) MCG/ACT inhaler INHALE 2 PUFFS INTO THE LUNGS EVERY 6 HOURS AS NEEDED FOR WHEEZING 7/14/21   MAHAD Wilson CNP   BREO ELLIPTA 100-25 MCG/INH AEPB inhaler INHALE 1 PUFF EVERY DAY 7/14/21   MAHAD Wilson  CNP   ferrous sulfate (IRON 325) 325 (65 Fe) MG tablet Take 325 mg by mouth 2 times daily  2/4/21 5/5/21  Historical Provider, MD   atorvastatin (LIPITOR) 40 MG tablet Take 1.5 tablets by mouth daily 1/14/21   Sergo Cannon MD   NIFEdipine (ADALAT CC) 60 MG extended release tablet Take 1 tablet by mouth daily 1/11/21   Devon Ramires MD   propafenone (RYTHMOL) 150 MG tablet Take 1 tablet by mouth every 8 hours 1/11/21   Devon Ramires MD   furosemide (LASIX) 40 MG tablet Take 0.5 tablets by mouth daily 1/11/21   Devon Ramires MD   fluticasone Aruna Boning) 50 MCG/ACT nasal spray USE 2 PUFFS IN [facility] NOSTRIL DAILY 1/8/21   BIJAN Mcgill   carbidopa-levodopa (SINEMET)  MG per tablet Take 1 tablet by mouth 4 times daily    Historical Provider, MD   Alpha-Lipoic Acid 300 MG TABS Take by mouth daily    Historical Provider, MD   lamoTRIgine (LAMICTAL) 25 MG tablet Take 25 mg by mouth 2 times daily 2 tabs BID    Historical Provider, MD   Handicap Placard MISC by Does not apply route DX: Gali Wynne  parkinson's disease  Exp: 9/1/2024 9/27/19   BIJAN Mcgill   ipratropium-albuterol (DUONEB) 0.5-2.5 (3) MG/3ML SOLN nebulizer solution Inhale 3 mLs into the lungs every 6 hours as needed for Shortness of Breath DX: J45.40 2/6/19   BIJAN Mcgill   Multiple Vitamins-Minerals (THERAPEUTIC MULTIVITAMIN-MINERALS) tablet Take 1 tablet by mouth daily Historical Provider, MD   solifenacin (VESICARE) 5 MG tablet Take 1 tablet by mouth daily. 6/25/13 10/29/13  Andreina Osullivan MD   aspirin EC 81 MG EC tablet Take 1 tablet by mouth daily. Start taking next week. Indications: OTC 2/25/13   Devon Leon MD       Allergies:  Levaquin [levofloxacin], Metoclopramide, Phenazopyridine, Pyridium [phenazopyridine hcl], Reglan [metoclopramide hcl], and Reglan [metoclopramide hcl]    Social History:      The patient currently lives     TOBACCO:   reports that she has never smoked. She has never used smokeless tobacco.  ETOH:   reports no history of alcohol use. E-Cigarettes/Vaping Use     Questions Responses    E-Cigarette/Vaping Use Never User    Start Date     Passive Exposure     Quit Date     Counseling Given     Comments             Family History:      Reviewed in detail and negative for DM, CAD, Cancer, CVA. Positive as follows:        Problem Relation Age of Onset    Diabetes Mother     Heart Disease Mother     Diabetes Father     Heart Disease Father     Diabetes Sister        REVIEW OF SYSTEMS COMPLETED:   Pertinent positives as noted in the HPI. All other systems reviewed and negative. PHYSICAL EXAM PERFORMED:    BP (!) 146/67   Pulse 66   Temp 98.1 °F (36.7 °C) (Oral)   Resp 16   Ht 5' 6\" (1.676 m)   Wt 255 lb (115.7 kg)   SpO2 93%   BMI 41.16 kg/m²     General appearance:  No apparent distress, appears stated age and cooperative. HEENT:  Normal cephalic, atraumatic without obvious deformity. Pupils equal, round, and reactive to light. Extra ocular muscles intact. Conjunctivae/corneas clear. Neck: Supple, with full range of motion. No jugular venous distention. Trachea midline. Respiratory:  Normal respiratory effort. Clear to auscultation, bilaterally without Rales/Wheezes/Rhonchi. Cardiovascular:  Regular rate and rhythm with normal S1/S2 without murmurs, rubs or gallops.   Abdomen: Soft, non-tender, non-distended with normal bowel sounds. Musculoskeletal:  No clubbing, cyanosis or edema bilaterally. Full range of motion without deformity. Skin: Skin color, texture, turgor normal.  No rashes or lesions. Neurologic:  Neurovascularly intact without any focal sensory/motor deficits. Cranial nerves: II-XII intact, grossly non-focal.  Psychiatric:  Alert and oriented, thought content appropriate, normal insight  Capillary Refill: Brisk,3 seconds, normal  Peripheral Pulses: +2 palpable, equal bilaterally       Labs:     Recent Labs     09/27/21 0355   WBC 14.6*   HGB 10.7*   HCT 33.2*        Recent Labs     09/27/21 0355      K 5.0   CL 98*   CO2 30   BUN 28*   CREATININE 1.6*   CALCIUM 9.3     Recent Labs     09/27/21 0355   AST 14*   ALT <5*   BILITOT 0.3   ALKPHOS 143*     No results for input(s): INR in the last 72 hours. Recent Labs     09/27/21 0355   TROPONINI <0.01       Urinalysis:      Lab Results   Component Value Date    NITRU Negative 07/28/2020    WBCUA  07/28/2020    BACTERIA 2+ 07/28/2020    RBCUA 5-10 07/28/2020    BLOODU Negative 07/28/2020    SPECGRAV 1.025 07/28/2020    GLUCOSEU Negative 07/28/2020       Radiology:     EKG: Normal sinus rhythmNormal ECGWhen compared with ECG of 27-SEP-2021 03:52,No significant change was found    XR CHEST PORTABLE   Final Result   Prominence of the cardiac silhouette and vasculature. There is likely some   degree of exaggeration by the technique and unsure if there is real vascular   congestion/volume overload. No focal consolidation or pleural effusion. .             ASSESSMENT:    Active Hospital Problems    Diagnosis Date Noted    Chest pain [R07.9] 09/27/2021    Chronic kidney disease, stage 4 (severe) ([facility] [N18.4] 03/01/2020    Partial symptomatic epilepsy with complex partial seizures, not intractable, without status epilepticus ([facility] [G40.209] 78/02/0152    Diastolic congestive heart failure ([facility] [I50.30] 01/17/2019    COPD (chronic obstructive pulmonary disease) ([facility] [J44.9] 12/10/2018    Paroxysmal atrial fibrillation (HCC) [I48.0]     Parkinson's disease ([facility] [G20] 05/22/2018    CAD (coronary artery disease) [I25.10]     Morbid obesity with BMI of 40.0-44.9, adult ([place] [address] [E66.01, Z68.41] 02/10/2017    Moderate persistent asthma [J45.40] 08/09/2016    Hypothyroidism [E03.9] 01/27/2015    Chronic normocytic anemia [D64.9]     Restless leg syndrome [G25.81]     Hyperlipidemia [E78.5]     Essential hypertension [I10]     GERD (gastroesophageal reflux disease) [K21.9]          PLAN:    Chest Pain: low suspicion of ACS but does have CAD hx with HX. Certainly,  HTN could be driving her chest pain since she came in with /69  - ecg non- acute, trop normal  - admit for ACS protocol, asa, statin, serial trop   - CXR w/o acute findings   - cardiology consulted- no need for stress or echo as pain is reporducable   - DD - WNL,   - currently on on NOAC    HTN- some urgency on on admission   - continue BP meds, titrate as needed     CAD- as above   - continue asa and cardiac meds     DM - on hx, not currently on meds per Med rec   - check A1C and SSI with FSBS     PAF- currently in NS   - cont NAOC and cardiac meds     HLD: cont statin     Hypothyroidism: check tsh cont home meds     CRLS: cont lamictal     Mod asthma: no exacerbation cont HHN     Parkinson disease: cont synthroid     CKD: stable monitor     Chronic diastolic heart failure   - cont diuretics and cardiac meds     Chronic anemia   - stable     Leukocytosis: uncertain etiology, CXR appears non acute, UA pending               DVT Prophylaxis: NOAC   Diet: No diet orders on file  Code Status: Prior    PT/OT Eval Status: not yet ordered    Dispo - pending work up        MAHAD Beauchamp - CNP    Thank you Xiomara Dietrich MD for the opportunity to be involved in this patient's care. If you have any questions or concerns please feel free to contact me at [phone].

## 2021-09-27 NOTE — CONSULTS
Called [facility] Cardio @ [phone]  Re: Chest pan hx of CAD with PAF  No call back required

## 2021-09-28 PROBLEM — N39.0 UTI (URINARY TRACT INFECTION): Status: ACTIVE | Noted: 2021-09-28

## 2021-09-28 LAB
CHOLESTEROL, TOTAL: 147 MG/DL (ref 0–199)
ESTIMATED AVERAGE GLUCOSE: 137 MG/DL
GLUCOSE BLD-MCNC: 112 MG/DL (ref 70–99)
GLUCOSE BLD-MCNC: 137 MG/DL (ref 70–99)
GLUCOSE BLD-MCNC: 144 MG/DL (ref 70–99)
GLUCOSE BLD-MCNC: 159 MG/DL (ref 70–99)
HBA1C MFR BLD: 6.4 %
HCT VFR BLD CALC: 28.9 % (ref 36–48)
HDLC SERPL-MCNC: 49 MG/DL (ref 40–60)
HEMOGLOBIN: 9.3 G/DL (ref 12–16)
LDL CHOLESTEROL CALCULATED: 59 MG/DL
MCH RBC QN AUTO: 27.5 PG (ref 26–34)
MCHC RBC AUTO-ENTMCNC: 32 G/DL (ref 31–36)
MCV RBC AUTO: 85.8 FL (ref 80–100)
PDW BLD-RTO: 14.2 % (ref 12.4–15.4)
PERFORMED ON: ABNORMAL
PLATELET # BLD: 298 K/UL (ref 135–450)
PMV BLD AUTO: 7.7 FL (ref 5–10.5)
RBC # BLD: 3.37 M/UL (ref 4–5.2)
TRIGL SERPL-MCNC: 194 MG/DL (ref 0–150)
VLDLC SERPL CALC-MCNC: 39 MG/DL
WBC # BLD: 12.1 K/UL (ref 4–11)

## 2021-09-28 PROCEDURE — 1200000000 HC SEMI PRIVATE

## 2021-09-28 PROCEDURE — 97116 GAIT TRAINING THERAPY: CPT

## 2021-09-28 PROCEDURE — 85027 COMPLETE CBC AUTOMATED: CPT

## 2021-09-28 PROCEDURE — 6370000000 HC RX 637 (ALT 250 FOR IP): Performed by: NURSE PRACTITIONER

## 2021-09-28 PROCEDURE — 6360000002 HC RX W HCPCS: Performed by: NURSE PRACTITIONER

## 2021-09-28 PROCEDURE — 36415 COLL VENOUS BLD VENIPUNCTURE: CPT

## 2021-09-28 PROCEDURE — 94640 AIRWAY INHALATION TREATMENT: CPT

## 2021-09-28 PROCEDURE — 2580000003 HC RX 258: Performed by: NURSE PRACTITIONER

## 2021-09-28 PROCEDURE — 97166 OT EVAL MOD COMPLEX 45 MIN: CPT

## 2021-09-28 PROCEDURE — 97162 PT EVAL MOD COMPLEX 30 MIN: CPT

## 2021-09-28 PROCEDURE — 97530 THERAPEUTIC ACTIVITIES: CPT

## 2021-09-28 PROCEDURE — 97535 SELF CARE MNGMENT TRAINING: CPT

## 2021-09-28 RX ADMIN — CEFTRIAXONE SODIUM 1000 MG: 1 INJECTION, POWDER, FOR SOLUTION INTRAMUSCULAR; INTRAVENOUS at 15:18

## 2021-09-28 RX ADMIN — ACETAMINOPHEN 650 MG: 325 TABLET ORAL at 21:58

## 2021-09-28 RX ADMIN — Medication 2 PUFF: at 20:56

## 2021-09-28 RX ADMIN — PROPAFENONE HYDROCHLORIDE 150 MG: 150 TABLET, FILM COATED ORAL at 21:50

## 2021-09-28 RX ADMIN — METOPROLOL TARTRATE 50 MG: 50 TABLET, FILM COATED ORAL at 08:26

## 2021-09-28 RX ADMIN — CARBIDOPA AND LEVODOPA 1 TABLET: 25; 100 TABLET ORAL at 16:40

## 2021-09-28 RX ADMIN — MONTELUKAST SODIUM 10 MG: 10 TABLET ORAL at 21:50

## 2021-09-28 RX ADMIN — INSULIN LISPRO 1 UNITS: 100 INJECTION, SOLUTION INTRAVENOUS; SUBCUTANEOUS at 21:59

## 2021-09-28 RX ADMIN — METOPROLOL TARTRATE 50 MG: 50 TABLET, FILM COATED ORAL at 21:50

## 2021-09-28 RX ADMIN — Medication 2 PUFF: at 12:44

## 2021-09-28 RX ADMIN — PROPAFENONE HYDROCHLORIDE 150 MG: 150 TABLET, FILM COATED ORAL at 13:15

## 2021-09-28 RX ADMIN — LAMOTRIGINE 25 MG: 25 TABLET ORAL at 09:54

## 2021-09-28 RX ADMIN — INSULIN LISPRO 1 UNITS: 100 INJECTION, SOLUTION INTRAVENOUS; SUBCUTANEOUS at 16:40

## 2021-09-28 RX ADMIN — VALSARTAN 160 MG: 80 TABLET, FILM COATED ORAL at 08:26

## 2021-09-28 RX ADMIN — APIXABAN 2.5 MG: 2.5 TABLET, FILM COATED ORAL at 21:50

## 2021-09-28 RX ADMIN — APIXABAN 2.5 MG: 2.5 TABLET, FILM COATED ORAL at 08:26

## 2021-09-28 RX ADMIN — FLUTICASONE PROPIONATE 1 SPRAY: 50 SPRAY, METERED NASAL at 08:27

## 2021-09-28 RX ADMIN — LAMOTRIGINE 25 MG: 25 TABLET ORAL at 21:50

## 2021-09-28 RX ADMIN — SODIUM CHLORIDE, PRESERVATIVE FREE 10 ML: 5 INJECTION INTRAVENOUS at 21:49

## 2021-09-28 RX ADMIN — CARBIDOPA AND LEVODOPA 1 TABLET: 25; 100 TABLET ORAL at 21:50

## 2021-09-28 RX ADMIN — SODIUM CHLORIDE, PRESERVATIVE FREE 10 ML: 5 INJECTION INTRAVENOUS at 09:51

## 2021-09-28 RX ADMIN — CARBIDOPA AND LEVODOPA 1 TABLET: 25; 100 TABLET ORAL at 13:15

## 2021-09-28 RX ADMIN — FUROSEMIDE 20 MG: 20 TABLET ORAL at 08:26

## 2021-09-28 RX ADMIN — ATORVASTATIN CALCIUM 40 MG: 40 TABLET, FILM COATED ORAL at 21:50

## 2021-09-28 RX ADMIN — DULOXETINE HYDROCHLORIDE 60 MG: 60 CAPSULE, DELAYED RELEASE ORAL at 08:26

## 2021-09-28 RX ADMIN — PROPAFENONE HYDROCHLORIDE 150 MG: 150 TABLET, FILM COATED ORAL at 05:36

## 2021-09-28 RX ADMIN — CARBIDOPA AND LEVODOPA 1 TABLET: 25; 100 TABLET ORAL at 08:26

## 2021-09-28 RX ADMIN — NIFEDIPINE 60 MG: 30 TABLET, FILM COATED, EXTENDED RELEASE ORAL at 08:26

## 2021-09-28 RX ADMIN — ASPIRIN 81 MG: 81 TABLET, COATED ORAL at 08:26

## 2021-09-28 ASSESSMENT — PAIN SCALES - GENERAL
PAINLEVEL_OUTOF10: 4
PAINLEVEL_OUTOF10: 0
PAINLEVEL_OUTOF10: 0

## 2021-09-28 NOTE — PROGRESS NOTES
Hospitalist Progress Note      PCP: Wang Rao MD    Date of Admission: 9/27/2021    Chief Complaint: Chest Pain (L sided CP that woke pt up from sleep. Pt on baby Jenniferia Crisp and Eliquis at home.)    Hospital Course: 80 y.o. female who presented to [facility] ED with C/P chest pain that woke the pt from sleep. PT has PMH of A. fib on oral anticoagulation, hypertension hyperlipidemia type II diabetic who presents with sudden onset left-sided chest wall/scapular pain started 2 AM.  Woke her from sleep.  \" Describes a sharp pain that is constant in nature.  May be exacerbated with taking deep breaths otherwise no clear exertional component that she is aware of.  Not associated with any diaphoresis or nausea.  States she feels slightly short of breath but secondary to pain.  No other midline chest or back pain no numbness or weakness no other radiation.  No fevers chills cough or sputum production.  No recent trauma. \"      ED course: ECG none acute, no e-lyte abnormalities, leukocytosis 14,  BP on admission 197/69 on admission. UA - pending. Normal DD and pro calcitonin. Trop normal.   [facility] medicine asked to admit for further evalauation and treatment           Subjective: EMR and notes reviewed pt seen and examined. Feeling better.  Up awake sitting up eating breakfast. No further chest pain or pressure       Medications:  Reviewed    Infusion Medications    sodium chloride 25 mL (09/27/21 1840)    dextrose       Scheduled Medications    apixaban  2.5 mg Oral BID    aspirin EC  81 mg Oral Daily    budesonide-formoterol  2 puff Inhalation BID    carbidopa-levodopa  1 tablet Oral 4x Daily    DULoxetine  60 mg Oral Daily    fluticasone  1 spray Each Nostril Daily    furosemide  20 mg Oral Daily    lamoTRIgine  25 mg Oral BID    metoprolol tartrate  50 mg Oral BID    montelukast  10 mg Oral Nightly    NIFEdipine  60 mg Oral Daily    propafenone  150 mg Oral 3 times per day    valsartan  160 mg Oral Daily    sodium chloride flush  5-40 mL IntraVENous 2 times per day    atorvastatin  40 mg Oral Nightly    insulin lispro  0-6 Units SubCUTAneous TID WC    insulin lispro  0-3 Units SubCUTAneous Nightly    cefTRIAXone (ROCEPHIN) IV  1,000 mg IntraVENous Q24H     PRN Meds: albuterol sulfate HFA, ipratropium-albuterol, sodium chloride flush, sodium chloride, ondansetron **OR** ondansetron, acetaminophen **OR** acetaminophen, polyethylene glycol, nitroGLYCERIN, glucose, dextrose, glucagon (rDNA), dextrose      Intake/Output Summary (Last 24 hours) at 9/28/2021 0733  Last data filed at 9/28/2021 0537  Gross per 24 hour   Intake 240 ml   Output 1500 ml   Net -1260 ml       Physical Exam Performed:    /69   Pulse 71   Temp 97.7 °F (36.5 °C) (Oral)   Resp 18   Ht 5' 6\" (1.676 m)   Wt 272 lb (123.4 kg)   SpO2 90%   BMI 43.90 kg/m²     General appearance: No apparent distress, appears stated age and cooperative. HEENT: Pupils equal, round, and reactive to light. Conjunctivae/corneas clear. Neck: Supple, with full range of motion. No jugular venous distention. Trachea midline. Respiratory:  Normal respiratory effort. Clear to auscultation, bilaterally without Rales/Wheezes/Rhonchi. Cardiovascular: Regular rate and rhythm with normal S1/S2 without murmurs, rubs or gallops. Abdomen: Soft, non-tender, non-distended with normal bowel sounds. Musculoskeletal: No clubbing, cyanosis or edema bilaterally. Full range of motion without deformity. Skin: Skin color, texture, turgor normal.  No rashes or lesions. Neurologic:  Neurovascularly intact without any focal sensory/motor deficits.  Cranial nerves: II-XII intact, grossly non-focal. essential tremor   Psychiatric: Alert and oriented, thought content appropriate, normal insight  Capillary Refill: Brisk,3 seconds, normal   Peripheral Pulses: +2 palpable, equal bilaterally       Labs:   Recent Labs     09/27/21  0355 09/28/21  0716   WBC 14.6* 12.1* HGB 10.7* 9.3*   HCT 33.2* 28.9*    298     Recent Labs     09/27/21  0355      K 5.0   CL 98*   CO2 30   BUN 28*   CREATININE 1.6*   CALCIUM 9.3     Recent Labs     09/27/21  0355   AST 14*   ALT <5*   BILITOT 0.3   ALKPHOS 143*     No results for input(s): INR in the last 72 hours. Recent Labs     09/27/21  1128 09/27/21  1733 09/27/21  1934   TROPONINI <0.01 <0.01 <0.01       Urinalysis:      Lab Results   Component Value Date    NITRU POSITIVE 09/27/2021    WBCUA >100 09/27/2021    BACTERIA 4+ 09/27/2021    RBCUA 0-2 09/27/2021    BLOODU Negative 09/27/2021    SPECGRAV 1.015 09/27/2021    GLUCOSEU Negative 09/27/2021       Radiology:  XR CHEST PORTABLE   Final Result   Prominence of the cardiac silhouette and vasculature. There is likely some   degree of exaggeration by the technique and unsure if there is real vascular   congestion/volume overload. No focal consolidation or pleural effusion. .                 Assessment/Plan:    Active Hospital Problems    Diagnosis     Chest pain [R07.9]     Chronic kidney disease, stage 4 (severe) ([facility]) [N18.4]     Partial symptomatic epilepsy with complex partial seizures, not intractable, without status epilepticus ([facility] [V82.622]     Diastolic congestive [facility] failure ([facility] [I50.30]     COPD (chronic obstructive pulmonary disease) ([facility] [J44.9]     PAF (paroxysmal atrial fibrillation) ([facility]) [I48.0]     Parkinson's disease ([facility] [G20]     CAD (coronary artery disease) [I25.10]     Morbid obesity with BMI of 40.0-44.9, adult ([facility] [E66.01, Z68.41]     Moderate persistent asthma [J45.40]     Hypothyroidism [E03.9]     Chronic normocytic anemia [D64.9]     Restless leg syndrome [G25.81]     Hyperlipidemia [E78.5]     Essential hypertension [I10]     GERD (gastroesophageal reflux disease) [K21.9]        Chest Pain: low suspicion of ACS but does have CAD hx with HX.  Certainly,  HTN could be driving her chest pain since she came in with BP 197/69  - ecg non- acute, trop normal  - admit for ACS protocol, asa, statin, serial trop   - CXR w/o acute findings   - cardiology consulted- no need for stress or echo as pain is reporducable   - DD - WNL,   - currently on on NOAC     HTN- some urgency on on admission   - continue BP meds, titrate as needed      CAD- as above   - continue asa and cardiac meds      DM - on hx, not currently on meds per Med rec   - check A1C and SSI with FSBS      PAF- currently in NS   - cont NAOC and cardiac meds      HLD: cont statin      Hypothyroidism: check tsh cont home meds      CRLS: cont lamictal      Mod asthma: no exacerbation cont HHN      Parkinson disease: cont synthroid      CKD: stable monitor      Chronic diastolic heart failure   - cont diuretics and cardiac meds      Chronic anemia   - stable      Leukocytosis: uncertain etiology, CXR appears non acute, UA with micro suspicious of UTI  - start Rocephin follow UC            DVT Prophylaxis: NOAC  Diet: Diet NPO  Code Status: Full Code    PT/OT Eval Status: ordered    Dispo - pending clinical course   MAHAD Rios - CNP

## 2021-09-28 NOTE — PROGRESS NOTES
Occupational Therapy   Occupational Therapy Initial Assessment/Treatment  Date: 2021   Patient Name: Mandeep Rosado  MRN: [id number]     : 1940    Date of Service: 2021    Discharge Recommendations:     OT Equipment Recommendations  Equipment Needed: No    Assessment   Performance deficits / Impairments: Decreased functional mobility ; Decreased coordination;Decreased balance;Decreased ADL status; Decreased high-level IADLs;Decreased endurance;Decreased strength;Decreased fine motor control;Decreased ROM  Assessment: Pt is a 71yo woman admitted for chest pain. Pt presents with the above deficits however pt and  report she presents with the deficits at baseline and req Mod-total A at home from . Pt req Mod A grossly for mobility and ADL completion. Pt demo good safety awarness. Pt and  stated they had no further concerns about home and pt was educated on the importance of OB activity and therex at home. OT does not recommend acute OT at this time d/t pt functioning at baseline. OT recommends d/c home with 24hr assist.  Prognosis: Fair  Decision Making: Medium Complexity  OT Education: OT Role;ADL Adaptive Strategies; Energy Conservation; Family Education  Patient Education: home set-up, adaptive strategies, therex and importance of OOB  Barriers to Learning: None preceived  No Skilled OT: At baseline function; Safe to return home  REQUIRES OT FOLLOW UP: No  Activity Tolerance  Activity Tolerance: Patient Tolerated treatment well  Activity Tolerance: BP: 148/75, HR: 98, O2: 94% RA  Safety Devices  Safety Devices in place: Yes  Type of devices: Gait belt;Call light within reach; Left in bed;Bed alarm in place; Patient at risk for falls;Nurse notified  Restraints  Initially in place: No         Patient Diagnosis(es): The encounter diagnosis was Chest pain, unspecified type.    has a past medical history of Anemia, unspecified, Asthma, Atrial fibrillation ([facility] [address] Baker's cyst, Basal cell carcinoma of left cheek, Blind left eye, Carotid artery stenoses, Carotid stenosis, Cervical spinal cord compression (HCC), 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-sided sciatica, CKD (chronic kidney disease) stage 3, GFR 30-59 ml/min ([address] Community acquired pneumonia of left lower lobe of lung, CRI, Detached retina, left, Diverticulosis, DJD (degenerative joint disease), Edema, Fatty liver, GERD (gastroesophageal reflux disease), Herniated lumbar intervertebral disc, Hx stage 2 sacral decubitus ulcer, Hyperlipidemia, Hypertension, Hypothyroid, Morbid obesity with BMI of 40.0-44.9, adult ([address] BETSY treated with BiPAP, Parkinson's disease ([address] Partial symptomatic epilepsy with complex partial seizures, not intractable, without status epilepticus ([address] Restless leg syndrome, Restless legs syndrome, Rheumatic fever, Sleep apnea, Tremor, essential, Type 2 diabetes mellitus without complication ([address] and Type II or unspecified type diabetes mellitus without mention of complication, not stated as uncontrolled. has a past surgical history that includes Tonsillectomy; Hysterectomy; Cholecystectomy; Tubal ligation; knee surgery; hernia repair; eye surgery; Cataract removal; vitrectomy; Corneal transplant; Carotid endarterectomy; Appendectomy; cervical fusion (11/2010); shoulder surgery; joint replacement (Bilateral, 2003); Colonoscopy (N/A, 3/8/2020); Upper gastrointestinal endoscopy (03/17/2020); and Upper gastrointestinal endoscopy (N/A, 3/17/2020).     Restrictions  Restrictions/Precautions  Restrictions/Precautions: Up as Tolerated, General Precautions  Required Braces or Orthoses?: No  Position Activity Restriction  Other position/activity restrictions: girish    Subjective   General  Chart Reviewed: Yes  Patient assessed for rehabilitation services?: Yes  Additional Pertinent Hx: Blind in left eye, Parkinson's disease, PAD, ashma, HLD, Arthritis,  Family / Caregiver Present: Yes ()  Referring Practitioner: Swapna Wyatt MD  Diagnosis: Chest pain  Subjective  Subjective: Pt pleasant and agreeable to tx  General Comment  Comments: RN cleared  Patient Currently in Pain: Denies  Vital Signs  Patient Currently in Pain: Denies  Social/Functional History  Social/Functional History  Lives With: Spouse (3 cats)  Type of Home: House  Home Layout: One level  Home Access: Stairs to enter without rails  Entrance Stairs - Number of Steps: 1 BRANDON with platform/ramp  Bathroom Shower/Tub: Walk-in shower  Bathroom Toilet: Handicap height  Bathroom Equipment: Grab bars in shower, Grab bars around toilet (Built in bench)  Bathroom Accessibility: [facility]: 4 wheeled walker, Wheelchair-electric, [employer], [address] chair, Grab bars, Electric scooter (hover round (new))  Receives Help From: Family  ADL Assistance: Needs assistance (needs help getting dressed and bathed ( reports she can do most of it))  Homemaking Responsibilities: No  Ambulation Assistance: Needs assistance  Transfer Assistance: Needs assistance  Active : No  Patient's  Info:  Drives  Mode of Transportation: Car (Pt reports no difficulty getting in however needs assist getting out)  Occupation: Retired  Type of occupation: taught swimming for [employer]  Additional Comments: Pt reports she is w/c bound at baseline with short walks with 4-WW. Pt states she has had 2-3 falls in he past 6 monhs     Objective   Vision: Impaired  Vision Exceptions: Wears glasses for reading;Legally blind (blind in L eye)  Hearing: Within functional limits    Orientation  Overall Orientation Status: Within Functional Limits     Balance  Sitting Balance: Stand by assistance  Standing Balance: Minimal assistance (with RW)  Standing Balance  Time: ~10 sec x3  Activity: to don brief and to stand EOB and scoot towards HOB  Comment: SW  ADL  Feeding: Setup  Grooming: Minimal assistance (hair and face seated EOB)  UE Bathing:  Moderate assistance (using bathing cloths)  LE Bathing: Moderate assistance (using bathing cloths)  UE Dressing: Minimal assistance;Setup  LE Dressing: Dependent/Total  Toileting: Dependent/Total (purewick)  Additional Comments: Pt and  report that pt is functioning at her baseline with ADLs and  provides moderate to total assist at home. Tone RUE  RUE Tone: Normotonic  Tone LUE  LUE Tone: Normotonic  Coordination  Movements Are Fluid And Coordinated: Yes  Coordination and Movement description: Tremors; Resting tremors;Right UE;Left UE (hx of parkinsons)     Bed mobility  Supine to Sit: Moderate assistance  Sit to Supine: Moderate assistance  Scooting: Moderate assistance  Transfers  Sit to stand:  Moderate assistance  Stand to sit: Moderate assistance  Transfer Comments: use of SW     Cognition  Overall Cognitive Status: WFL     Sensation  Overall Sensation Status: Impaired (n/t in BLE)      LUE AROM (degrees)  LUE AROM : WFL  Left Hand AROM (degrees)  Left Hand AROM: WFL  RUE AROM (degrees)  RUE AROM : WFL  Right Hand AROM (degrees)  Right Hand AROM: WFL  LUE Strength  Gross LUE Strength: WFL  L Hand General: 4-/5  RUE Strength  Gross RUE Strength: WFL  R Hand General: 4-/5    Plan   Plan  Times per week: 1x eval/treat    AM-PAC Score  AM-[facility] Inpatient Daily Activity Raw Score: 12 (09/28/21 1544)  AM-PAC Inpatient ADL T-Scale Score : 30.6 (09/28/21 1544)  ADL Inpatient CMS 0-100% Score: 66.57 (09/28/21 1544)  ADL Inpatient CMS G-Code Modifier : CL (09/28/21 1544)    Goals  Short term goals  Time Frame for Short term goals: 1x eval/treat  Patient Goals   Patient goals : \"to go home\"       Therapy Time   Individual Concurrent Group Co-treatment   Time In 1413         Time Out 1456         Minutes 43         Timed Code Treatment Minutes: 33 Minutes (10 min eval)       MERLY Josue/JAIMIE

## 2021-09-28 NOTE — PROGRESS NOTES
Physical Therapy    Facility/Department: [facility] A2 CARD TELEMETRY  Initial Assessment and Treatment    NAME: Gaviota Layton  : 1940  MRN: [id number]    Date of Service: 2021    Discharge Recommendations:  24 hour supervision or assist, Home with Home health PT   PT Equipment Recommendations  Equipment Needed: No  Other: Pt owns 4WW and scooter    Assessment   Body structures, Functions, Activity limitations: Decreased functional mobility ; Decreased endurance;Decreased balance; Increased pain  Assessment: Pt is a 81 y/o female presenting to [facility] with Chest pain. PTA, pt was IND with mobility, but receives assist from  for most ADLs and mobility. Pt reports he is able to care for her well at home and has no concerns with d/c. Pt currently requires Mod A for bed mobility, Mod A for sit<>stands from bed and commode, and Min A for ambulation with RW. Pt reporting increased pain with mobility and RN aware. Pt will benefit from continued skilled PT to address deficits and increase independence at home d/t pt was able to transfer independently at home. Recommend Home with 24hr assist and home PT. Treatment Diagnosis: Decreased functional mobility  Prognosis: Good  Decision Making: Medium Complexity  PT Education: Goals;PT Role;Plan of Care  Patient Education: Pt verbalized understanding of importance of OOB mobility. REQUIRES PT FOLLOW UP: Yes  Activity Tolerance  Activity Tolerance: Patient Tolerated treatment well;Patient limited by pain  Activity Tolerance: Pt fatigued at end of session and reporting increased pain. RN aware. Vitals stable throughout session. Patient Diagnosis(es): The encounter diagnosis was Chest pain, unspecified type.      has a past medical history of Anemia, unspecified, Asthma, Atrial fibrillation ([address] Baker's cyst, Basal cell carcinoma of left cheek, Blind left eye, Carotid artery stenoses, Carotid stenosis, Cervical spinal cord compression (HCC), 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-sided sciatica, CKD (chronic kidney disease) stage 3, GFR 30-59 ml/min ([address] Community acquired pneumonia of left lower lobe of lung, CRI, Detached retina, left, Diverticulosis, DJD (degenerative joint disease), Edema, Fatty liver, GERD (gastroesophageal reflux disease), Herniated lumbar intervertebral disc, Hx stage 2 sacral decubitus ulcer, Hyperlipidemia, Hypertension, Hypothyroid, Morbid obesity with BMI of 40.0-44.9, adult ([address] BETSY treated with BiPAP, Parkinson's disease ([address] Partial symptomatic epilepsy with complex partial seizures, not intractable, without status epilepticus ([address] Restless leg syndrome, Restless legs syndrome, Rheumatic fever, Sleep apnea, Tremor, essential, Type 2 diabetes mellitus without complication ([address] and Type II or unspecified type diabetes mellitus without mention of complication, not stated as uncontrolled. has a past surgical history that includes Tonsillectomy; Hysterectomy; Cholecystectomy; Tubal ligation; knee surgery; hernia repair; eye surgery; Cataract removal; vitrectomy; Corneal transplant; Carotid endarterectomy; Appendectomy; cervical fusion (11/2010); shoulder surgery; joint replacement (Bilateral, 2003); Colonoscopy (N/A, 3/8/2020); Upper gastrointestinal endoscopy (03/17/2020); and Upper gastrointestinal endoscopy (N/A, 3/17/2020).     Restrictions  Restrictions/Precautions  Restrictions/Precautions: Up as Tolerated, General Precautions  Required Braces or Orthoses?: No  Position Activity Restriction  Other position/activity restrictions: purewick  Vision/Hearing  Vision: Impaired  Vision Exceptions: Wears glasses for reading;Legally blind (blind in L eye)  Hearing: Within functional limits     Subjective  General  Chart Reviewed: Yes  Patient assessed for rehabilitation services?: Yes  Response To Previous Treatment: Not applicable  Family / Caregiver Present: No  Referring Practitioner: MAHAD Boyle CNP  Referral Date : numbness laterally)  Bed mobility  Supine to Sit: Moderate assistance  Sit to Supine: Minimal assistance  Scooting: Moderate assistance  Comment: With HOB elevated and BR used; pt reports using bed rails at home. Transfers  Sit to Stand: Moderate Assistance  Stand to sit: Moderate Assistance  Bed to Chair: Minimal assistance  Comment: Mod A for sit<>stands to RW; Min A for transfers with RW. Ambulation  Ambulation?: Yes  More Ambulation?: No  Ambulation 1  Surface: level tile  Device: Rolling Walker  Assistance: Minimal assistance  Quality of Gait: Short shuffled steps, wide NENA, forward trunk flexion, no LOB. Pt fatigued at end of session, but vitals stable with SpO2 >96% on RA. Gait Deviations: Slow Nemo;Decreased step height;Decreased step length;Shuffles  Distance: 10 feet + 10 feet  Stairs/Curb  Stairs?: No     Balance  Posture: Fair  Sitting - Static: Good  Sitting - Dynamic: Good  Standing - Static: Fair;+  Standing - Dynamic: Fair  Comments: with RW        Plan   Plan  Times per week: 2-3x  Times per day: Daily  Plan weeks: 1 week, by 10/5/21  Current Treatment Recommendations: Strengthening, Balance Training, Functional Mobility Training, Transfer Training, Endurance Training, Gait Training, Stair training, Pain Management, Home Exercise Program, Safety Education & Training, Patient/Caregiver Education & Training, Equipment Evaluation, Education, & procurement  Safety Devices  Type of devices:  All fall risk precautions in place, Bed alarm in place, Call light within reach, Left in bed, Patient at risk for falls, Gait belt, Nurse notified    AM-PAC Score     AM-PAC Inpatient Mobility without Stair Climbing Raw Score : 13 (09/28/21 1744)  AM-PAC Inpatient without Stair Climbing T-Scale Score : 38.96 (09/28/21 1744)  Mobility Inpatient CMS 0-100% Score: 58.44 (09/28/21 1744)  Mobility Inpatient without Stair CMS G-Code Modifier : CK (09/28/21 1744)     Goals  Short term goals  Time Frame for Short term goals: 5-7 days, unless otherwise stated, by 10/5/21  Short term goal 1: Pt will ambulate 10 feet with SBA and LRAD. Short term goal 2: Pt will complete bed mobility with SBA and rails. Short term goal 3: Pt will complete 12-15 reps of BLE ther ex for strength and ROM by 10/1/21. Short term goal 4: Pt will complete SPT with LRAD and SBA. Patient Goals   Patient goals : To feel better and go home. Therapy Time   Individual Concurrent Group Co-treatment   Time In 1636         Time Out 1703         Minutes 27         Timed Code Treatment Minutes: 17 Minutes (10 minute eval)     If pt is unable to be seen after this session, please let this note serve as discharge summary. Please see case management note for discharge disposition. Thank you.     Jack Avila, PT

## 2021-09-28 NOTE — CARE COORDINATION
CASE MANAGEMENT INITIAL ASSESSMENT    Reviewed chart and completed assessment with pt at bedside  Explained Case Management role/services. Health Care Decision Maker :   Primary Decision Maker: Wong Becker Spouse - [phone]        Admit date/status:9/27 Observation  Diagnosis: Chest pain   Is this a Readmission?:  No      Insurance: Medicare, [facility] secondary    Precert required for SNF: No       3 night stay required: No, waived during pandemic. Living arrangements, Adls, care needs, prior to admission: Pt lives at home with , Adalberto Shown. Pt has ramp to enter home, no steps inside. Pt uses wheeled walker, hover round, recliner that stands, shower chair. Pt does not have any home care currently. Transportation: Pt to arrange with Adalberto Shown, pt does not typically drive anymore due to Parkinson's. PT/OT recs: Not ordered    Barriers to discharge: None noted    Plan/comments: Pt denies any new needs at this time, states would be agreeable to home care if needed. CM will continue to follow pt's progress and coordinate discharge arrangements as appropriate. ECOC on chart for MD signature.   JOHN Mcknight-RN

## 2021-09-29 LAB
GLUCOSE BLD-MCNC: 121 MG/DL (ref 70–99)
GLUCOSE BLD-MCNC: 122 MG/DL (ref 70–99)
GLUCOSE BLD-MCNC: 142 MG/DL (ref 70–99)
GLUCOSE BLD-MCNC: 175 MG/DL (ref 70–99)
PERFORMED ON: ABNORMAL

## 2021-09-29 PROCEDURE — 1200000000 HC SEMI PRIVATE

## 2021-09-29 PROCEDURE — 97530 THERAPEUTIC ACTIVITIES: CPT

## 2021-09-29 PROCEDURE — 2580000003 HC RX 258: Performed by: NURSE PRACTITIONER

## 2021-09-29 PROCEDURE — 97116 GAIT TRAINING THERAPY: CPT

## 2021-09-29 PROCEDURE — 6370000000 HC RX 637 (ALT 250 FOR IP): Performed by: NURSE PRACTITIONER

## 2021-09-29 PROCEDURE — 94640 AIRWAY INHALATION TREATMENT: CPT

## 2021-09-29 PROCEDURE — 6360000002 HC RX W HCPCS: Performed by: NURSE PRACTITIONER

## 2021-09-29 RX ADMIN — DULOXETINE HYDROCHLORIDE 60 MG: 60 CAPSULE, DELAYED RELEASE ORAL at 08:53

## 2021-09-29 RX ADMIN — ATORVASTATIN CALCIUM 40 MG: 40 TABLET, FILM COATED ORAL at 21:22

## 2021-09-29 RX ADMIN — PROPAFENONE HYDROCHLORIDE 150 MG: 150 TABLET, FILM COATED ORAL at 21:22

## 2021-09-29 RX ADMIN — FUROSEMIDE 20 MG: 20 TABLET ORAL at 08:54

## 2021-09-29 RX ADMIN — CARBIDOPA AND LEVODOPA 1 TABLET: 25; 100 TABLET ORAL at 08:54

## 2021-09-29 RX ADMIN — LAMOTRIGINE 25 MG: 25 TABLET ORAL at 21:22

## 2021-09-29 RX ADMIN — FLUTICASONE PROPIONATE 1 SPRAY: 50 SPRAY, METERED NASAL at 08:55

## 2021-09-29 RX ADMIN — MONTELUKAST SODIUM 10 MG: 10 TABLET ORAL at 21:22

## 2021-09-29 RX ADMIN — Medication 2 PUFF: at 19:43

## 2021-09-29 RX ADMIN — VALSARTAN 160 MG: 80 TABLET, FILM COATED ORAL at 08:53

## 2021-09-29 RX ADMIN — METOPROLOL TARTRATE 50 MG: 50 TABLET, FILM COATED ORAL at 08:54

## 2021-09-29 RX ADMIN — PROPAFENONE HYDROCHLORIDE 150 MG: 150 TABLET, FILM COATED ORAL at 13:30

## 2021-09-29 RX ADMIN — SODIUM CHLORIDE, PRESERVATIVE FREE 10 ML: 5 INJECTION INTRAVENOUS at 08:55

## 2021-09-29 RX ADMIN — CEFTRIAXONE SODIUM 1000 MG: 1 INJECTION, POWDER, FOR SOLUTION INTRAMUSCULAR; INTRAVENOUS at 16:02

## 2021-09-29 RX ADMIN — CARBIDOPA AND LEVODOPA 1 TABLET: 25; 100 TABLET ORAL at 13:30

## 2021-09-29 RX ADMIN — Medication 2 PUFF: at 08:14

## 2021-09-29 RX ADMIN — SODIUM CHLORIDE, PRESERVATIVE FREE 10 ML: 5 INJECTION INTRAVENOUS at 21:23

## 2021-09-29 RX ADMIN — CARBIDOPA AND LEVODOPA 1 TABLET: 25; 100 TABLET ORAL at 21:22

## 2021-09-29 RX ADMIN — LAMOTRIGINE 25 MG: 25 TABLET ORAL at 08:54

## 2021-09-29 RX ADMIN — PROPAFENONE HYDROCHLORIDE 150 MG: 150 TABLET, FILM COATED ORAL at 06:44

## 2021-09-29 RX ADMIN — NIFEDIPINE 60 MG: 30 TABLET, FILM COATED, EXTENDED RELEASE ORAL at 08:53

## 2021-09-29 RX ADMIN — METOPROLOL TARTRATE 50 MG: 50 TABLET, FILM COATED ORAL at 21:22

## 2021-09-29 RX ADMIN — ASPIRIN 81 MG: 81 TABLET, COATED ORAL at 08:54

## 2021-09-29 RX ADMIN — INSULIN LISPRO 1 UNITS: 100 INJECTION, SOLUTION INTRAVENOUS; SUBCUTANEOUS at 21:23

## 2021-09-29 RX ADMIN — CARBIDOPA AND LEVODOPA 1 TABLET: 25; 100 TABLET ORAL at 17:50

## 2021-09-29 RX ADMIN — APIXABAN 2.5 MG: 2.5 TABLET, FILM COATED ORAL at 08:54

## 2021-09-29 RX ADMIN — APIXABAN 2.5 MG: 2.5 TABLET, FILM COATED ORAL at 21:22

## 2021-09-29 ASSESSMENT — PAIN SCALES - GENERAL
PAINLEVEL_OUTOF10: 0

## 2021-09-29 NOTE — CARE COORDINATION
Ref made to [facility] who will follow pt. Pt will d/c home w/spouse when stable.   Aviva Rose RN

## 2021-09-29 NOTE — PROGRESS NOTES
Physician Progress Note      Roldan Bustamante  CSN #:                  [id number]  :                       1940  ADMIT DATE:       2021 3:53 AM  DISCH DATE:  RESPONDING  PROVIDER #:        Mounika Cook CNP          QUERY TEXT:    Pt admitted with chest pain. Pt noted to have UTI meeting on Sepsis indicators   of WBC and CRP elevation on arrival. If possible, please document in the   progress notes and discharge summary if you are evaluating and /or treating   any of the following: The medical record reflects the following:  Risk Factors: UTI, obesity, CKD    Clinical Indicators: on arrival to ED - WBC 14.6, CRP 29.1, UTI. Urine Cx-   Ecoli  Temp 98.1, HR 71, LA 0.9, PCT 0.05    Treatment: labs, Rocephin, ongoing supportive care/monitoring. thank you,  jr crowley RN, BSN  [email or web address]  Options provided:  -- Sepsis d/t UTI is POA  -- UTI without Sepsis  -- Other - I will add my own diagnosis  -- Disagree - Not applicable / Not valid  -- Disagree - Clinically unable to determine / Unknown  -- Refer to Clinical Documentation Reviewer    PROVIDER RESPONSE TEXT:    This patient has UTI without Sepsis.     Query created by: Bill Tran on 2021 2:27 PM      Electronically signed by:  Mounika Cook CNP 2021 6:24 PM

## 2021-09-29 NOTE — PROGRESS NOTES
Physical Therapy  Facility/Department: [facility] A2 CARD TELEMETRY  Daily Treatment Note  NAME: Blake Omer  : 1940  MRN: [id number]    Date of Service: 2021    Discharge Recommendations:  24 hour supervision or assist, Home with Home health PT   PT Equipment Recommendations  Equipment Needed: No  Other: Pt owns 4WW and scooter    Assessment   Body structures, Functions, Activity limitations: Decreased functional mobility ; Decreased endurance;Decreased balance; Increased pain  Assessment: Pt requires decreased assistance with sit<>stand transfers this date compared to previous session. Able to complete sit<>stand transfers with min A this morning however continues to require mod A for bed mobility. Pt ambulates ~10ft in room with CGA-min A using RW. Pt continues to report no concerns regarding d/c home with spouse when medically cleared. She is pleasant and cooperative, motivated to participate in OOB activity. Continue to recommend 24 hr assist and HHPT upon d/c. Will continue to progress functional mobility as appropriate. Treatment Diagnosis: Decreased functional mobility  Prognosis: Good  Decision Making: Medium Complexity  PT Education: PT Role;Gait Training;Functional Mobility Training;Transfer Training  Patient Education: Educated pt on d/c recs, safety, importance of OOB mobility; pt verbalizes understanding  REQUIRES PT FOLLOW UP: Yes  Activity Tolerance  Activity Tolerance: Patient Tolerated treatment well  Activity Tolerance: /59, HR 66, O2 sats 95% on room air     Patient Diagnosis(es): The encounter diagnosis was Chest pain, unspecified type.      has a past medical history of Anemia, unspecified, Asthma, Atrial fibrillation ([facility] [address] Baker's cyst, Basal cell carcinoma of left cheek, Blind left eye, Carotid artery stenoses, Carotid stenosis, Cervical spinal cord compression (HCC), 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-sided sciatica, CKD (chronic kidney disease) stage 3, GFR 30-59 ml/min ([facility] [address] Community acquired pneumonia of left lower lobe of lung, CRI, Detached retina, left, Diverticulosis, DJD (degenerative joint disease), Edema, Fatty liver, GERD (gastroesophageal reflux disease), Herniated lumbar intervertebral disc, Hx stage 2 sacral decubitus ulcer, Hyperlipidemia, Hypertension, Hypothyroid, Morbid obesity with BMI of 40.0-44.9, adult ([address] BETSY treated with BiPAP, Parkinson's disease ([facility] [address] Partial symptomatic epilepsy with complex partial seizures, not intractable, without status epilepticus ([facility] [address] Restless leg syndrome, Restless legs syndrome, Rheumatic fever, Sleep apnea, Tremor, essential, Type 2 diabetes mellitus without complication ([facility] [address] and Type II or unspecified type diabetes mellitus without mention of complication, not stated as uncontrolled. has a past surgical history that includes Tonsillectomy; Hysterectomy; Cholecystectomy; Tubal ligation; knee surgery; hernia repair; eye surgery; Cataract removal; vitrectomy; Corneal transplant; Carotid endarterectomy; Appendectomy; cervical fusion (11/2010); shoulder surgery; joint replacement (Bilateral, 2003); Colonoscopy (N/A, 3/8/2020); Upper gastrointestinal endoscopy (03/17/2020); and Upper gastrointestinal endoscopy (N/A, 3/17/2020). Restrictions  Restrictions/Precautions  Restrictions/Precautions: Up as Tolerated, General Precautions  Required Braces or Orthoses?: No  Position Activity Restriction  Other position/activity restrictions: girish     Subjective   General  Chart Reviewed: Yes  Response To Previous Treatment: Patient with no complaints from previous session.   Family / Caregiver Present: No  Referring Practitioner: MAHAD Salazar CNP  Subjective  Subjective: Pt supine in bed upon arrival and agreeable to PT session; states she is hoping to d/c home this afternoon  General Comment  Comments: RN clears pt for PT session this morning  Pain Screening  Patient Currently in Pain: Denies  Vital Signs  Patient Currently in Pain: Denies          Orientation  Orientation  Overall Orientation Status: Within Functional Limits     Objective   Bed mobility  Supine to Sit: Moderate assistance (assist for trunk)     Transfers  Sit to Stand: Minimal Assistance (up to RW)  Stand to sit: Minimal Assistance  Stand Pivot Transfers: Minimal Assistance (using RW, bed>chair)     Ambulation  Ambulation?: Yes  Ambulation 1  Surface: level tile  Device: Rolling Walker  Assistance: Contact guard assistance;Minimal assistance  Quality of Gait: decreased jamaal and B step length, widened NENA, decreased B foot clearance, min forward flexion of trunk. No LOB. Distance: 10ft + 10ft  Comments: distance limited by fatigue; O2 sats 95% or greater on room air           Exercises  Ankle Pumps: x 20 BLE                     AM-PAC Score  AM-PAC Inpatient Mobility Raw Score : 17 (09/29/21 0901)  AM-PAC Inpatient T-Scale Score : 42.13 (09/29/21 0901)  Mobility Inpatient CMS 0-100% Score: 50.57 (09/29/21 0901)  Mobility Inpatient CMS G-Code Modifier : CK (09/29/21 0901)         Goals  Short term goals  Time Frame for Short term goals: 5-7 days, unless otherwise stated, by 10/5/21  Short term goal 1: Pt will ambulate 10 feet with SBA and LRAD. Short term goal 2: Pt will complete bed mobility with SBA and rails. Short term goal 3: Pt will complete 12-15 reps of BLE ther ex for strength and ROM by 10/1/21. Short term goal 4: Pt will complete SPT with LRAD and SBA. Patient Goals   Patient goals : To feel better and go home.     Plan    Plan  Times per week: 2-3x  Times per day: Daily  Plan weeks: 1 week, by 10/5/21  Current Treatment Recommendations: Strengthening, Balance Training, Functional Mobility Training, Transfer Training, Endurance Training, Gait Training, Stair training, Pain Management, Home Exercise Program, Safety Education & Training, Patient/Caregiver Education & Training, Equipment Evaluation, Education, & procurement  Safety Devices  Type of devices:  All fall risk precautions in place, Call light within reach, Chair alarm in place, Left in chair, Nurse notified     Therapy Time   Individual Concurrent Group Co-treatment   Time In 0805         Time Out 0845         Minutes 40         Timed Code Treatment Minutes: 16 Irvin Morgan, PT, DPT

## 2021-09-29 NOTE — PLAN OF CARE
Problem: Falls - Risk of:  Goal: Will remain free from falls  Description: Will remain free from falls  Outcome: Met This Shift  Goal: Absence of physical injury  Description: Absence of physical injury  Outcome: Met This Shift     Problem: Skin Integrity:  Goal: Will show no infection signs and symptoms  Description: Will show no infection signs and symptoms  Outcome: Met This Shift  Goal: Absence of new skin breakdown  Description: Absence of new skin breakdown  Outcome: Met This Shift     Problem: OXYGENATION/RESPIRATORY FUNCTION  Goal: Patient will maintain patent airway  9/29/2021 1742 by Jos Oates RN  Outcome: Met This Shift  9/29/2021 0407 by Stephane Meraz RN  Outcome: Ongoing  Goal: Patient will achieve/maintain normal respiratory rate/effort  Description: Respiratory rate and effort will be within normal limits for the patient  Outcome: Met This Shift     Problem: HEMODYNAMIC STATUS  Goal: Patient has stable vital signs and fluid balance  9/29/2021 1742 by Jos Oates RN  Outcome: Met This Shift  9/29/2021 0407 by Stephane Meraz RN  Outcome: Ongoing     Problem: FLUID AND ELECTROLYTE IMBALANCE  Goal: Fluid and electrolyte balance are achieved/maintained  Outcome: Met This Shift     Problem: ACTIVITY INTOLERANCE/IMPAIRED MOBILITY  Goal: Mobility/activity is maintained at optimum level for patient  Outcome: Met This Shift

## 2021-09-29 NOTE — DISCHARGE INSTR - COC
Continuity of Care Form    Patient Name: Kelly Chandra   :  1940  MRN:  [id number]    Admit date:  2021  Discharge date:  21    Code Status Order: Full Code   Advance Directives:      Admitting Physician:  Abram Burrell MD  PCP: Brock Barthel, MD    Discharging Nurse: [facility] Unit/Room#: 0208/0208-01  Discharging Unit Phone Number: [phone]    Emergency Contact:   Extended Emergency Contact Information  Primary Emergency Contact:  Wesly Bowen  Address: [address] [place] [address]  Home Phone: [phone]  Mobile Phone: [phone]  Relation: Spouse    Past Surgical History:  Past Surgical History:   Procedure Laterality Date    APPENDECTOMY      CAROTID ENDARTERECTOMY      left    CATARACT REMOVAL      CERVICAL FUSION  2010    CHOLECYSTECTOMY      COLONOSCOPY N/A 3/8/2020    COLONOSCOPY FLEXIBLE W/ DECOMPRESSION performed by Al[facility] at [facility]      JOINT REPLACEMENT Bilateral     KNEE SURGERY      replacement x 2    SHOULDER SURGERY      TONSILLECTOMY      TUBAL LIGATION      UPPER GASTROINTESTINAL ENDOSCOPY  2020    Esophagitis    UPPER GASTROINTESTINAL ENDOSCOPY N/A 3/17/2020    EGD performed by Fady Wahl MD at [address] [place] [address]         Immunization History:   Immunization History   Administered Date(s) Administered    COVID-19, Pfizer, PF, 30mcg/0.3mL 02/10/2021, 2021    Hepatitis A 2019    Hepatitis A Adult (Havrix, Vaqta) 2019, 2019    Hepatitis A Adult (Vaqta) 2019    Influenza A ([id number]) Vaccine PF IM 2010    Influenza Vaccine, unspecified formulation 2016, 2017    Influenza Virus Vaccine 2011, 10/01/2012, 2016    Influenza Whole 2015    Influenza, High Dose (Fluzone 65 yrs and older) 2015, 08/15/2016, 09/15/2017, 09/14/2018, 09/21/2020    Influenza, High-dose, [place], 65 yrs +, IM (Fluzone) 09/21/2020    Influenza, Triv, inactivated, subunit, adjuvanted, IM (Fluad 65 yrs and older) 09/27/2019    Pneumococcal Conjugate 13-valent ([id number]) 07/28/2015    Pneumococcal Polysaccharide ([id number]) 11/26/2007    Tdap (Boostrix, Adacel) 03/30/2021    Zoster Recombinant (Shingrix) 05/08/2019, 12/17/2019       Active Problems:  Patient Active Problem List   Diagnosis Code    GERD (gastroesophageal reflux disease) K21.9    Essential hypertension I10    Hyperlipidemia E78.5    Restless leg syndrome G25.81    Chronic normocytic anemia D64.9    Postural dizziness R42    Moderate persistent asthma J45.40    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-sided sciatica M54.41, G89.29    Morbid obesity with BMI of 40.0-44.9, adult ([facility]) E66.01, Z68.41    Type 2 diabetes mellitus with stage 4 chronic kidney disease, without long-term current use of insulin ([facility]) E11.22, N18.4    CAD (coronary artery disease) I25.10    Parkinson's disease ([facility] G20    Severe obstructive sleep apnea G47.33    Hypothyroidism E03.9    Hepatic steatosis K76.0    Diverticulosis K57.90    Blind left eye H54.40    L carotid artery stenosis s/p CEA I65.29    PAF (paroxysmal atrial fibrillation) ([facility]) I48.0    COPD (chronic obstructive pulmonary disease) ([facility]) P65.4    Diastolic congestive heart failure ([facility]) I50.30    Partial symptomatic epilepsy with complex partial seizures, not intractable, without status epilepticus ([facility]) G40.209    Chronic kidney disease, stage 4 (severe) ([facility]) N18.4    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    Sialadenitis K11.20    Chest pain R07.9    Pulmonary HTN ([facility]) I27.20    Mitral valve insufficiency I34.0    UTI (urinary tract infection) N39.0       Isolation/Infection:   Isolation            No Isolation          Patient Infection Status       None to display            Nurse Assessment:  Last Vital Signs: /73   Pulse 66   Temp 98.3 °F (36.8 °C) (Oral)   Resp 16   Ht 5' 6\" (1.676 m)   Wt 275 lb 12.8 oz (125.1 kg)   SpO2 92%   BMI 44.52 kg/m²     Last documented pain score (0-10 scale): Pain Level: 0  Last Weight:   Wt Readings from Last 1 Encounters:   09/29/21 275 lb 12.8 oz (125.1 kg)     Mental Status:  oriented, alert, coherent, logical, thought processes intact and able to concentrate and follow conversation    IV Access:  - None    Nursing Mobility/ADLs:  Walking   Assisted  Transfer  Assisted  Bathing  Assisted  Dressing  Assisted  Toileting  Assisted  Feeding  Independent  Med Admin  Assisted  Med Delivery   whole    Wound Care Documentation and Therapy:  Wound 12/10/18 Buttocks Right Stage II pressure ulcer (Active)   Number of days: 1023        Elimination:  Continence:   · Bowel: Yes  · Bladder: Yes  Urinary Catheter: None   Colostomy/Ileostomy/Ileal Conduit: No       Date of Last BM: 9/30/21    Intake/Output Summary (Last 24 hours) at 9/29/2021 1646  Last data filed at 9/28/2021 2139  Gross per 24 hour   Intake 240 ml   Output    Net 240 ml     I/O last 3 completed shifts: In: 5 [P.O.:720]  Out: -     Safety Concerns: At Risk for Falls    Impairments/Disabilities:      None    Nutrition Therapy:  Current Nutrition Therapy:   - Oral Diet:  Carb Control 5 carbs/meal (2000kcals/day) and Low Sodium (2gm)    Routes of Feeding: Oral  Liquids: No Restrictions  Daily Fluid Restriction: no  Last Modified Barium Swallow with Video (Video Swallowing Test): not done    Treatments at the Time of Hospital Discharge:   Respiratory Treatments: albuterol sulfate  (90 Base) MCG/ACT inhaler 2 puffDose: 2 puff : Inhalation : EVERY 6 HOURS PRN : Wheezing  budesonide-formoterol (SYMBICORT) 80-4.5 MCG/ACT inhaler 2 puffDose: 2 puff :  Inhalation : 2 TIMES DAILY  ipratropium-albuterol (DUONEB) nebulizer solution 3 mLOrdered Dose: 1 vial : Admin Dose: 3 mL : Inhalation : EVERY 4 HOURS PRN : Shortness of Breath    Oxygen Therapy:  is not on home oxygen therapy.   Ventilator:    - No ventilator support    Rehab Therapies: Physical Therapy, Occupational Therapy and Nurse (therapy soc approved)  Weight Bearing Status/Restrictions: No weight bearing restirctions  Other Medical Equipment (for information only, NOT a DME order):  walker  Other Treatments: [facility]: LEVEL 3 [address] to establish plan of care for patient over 60 day period   [address] Initial home SN evaluation visit to occur within 24-48 hours for:  1)  medication management  2)  VS and clinical assessment  3)  S&S chronic disease exacerbation education + when to contact MD/NP  4)  care coordination   Medication Reconciliation during 1st SN visit   PT/OT/Speech    Evaluations in home within 24-48 hours of discharge to include DME and home safety   Claros Frontload therapy 5 days, then 3x a week    OT to evaluate if patient has [address] needs for personal care     evaluation within 24-48 hours to evaluate resources & insurance for potential [place], [place], LTC, and Medicaid options    Palliative Care referral within 5 days of hospital discharge   PCP Visit scheduled within 3 - 7 days of hospital discharge    [address] [facility] [address] (If patient is agreeable and meets guidelines)      Patient's personal belongings (please select all that are sent with patient):  None    RN SIGNATURE:  Electronically signed by Ángel Chauhan RN on 9/30/21 at 10:38 AM EDT    CASE MANAGEMENT/SOCIAL WORK SECTION    Inpatient Status Date: 9/21/21    Readmission Risk Assessment Score:  Readmission Risk              Risk of Unplanned Readmission:  24            Discharging to Facility/ Agency    Name:  [facility] Address: [address] [place] [address]., [address] [place] [address]., [place] [address]   Phone: [phone] Fax: [phone]      Dialysis Facility (if applicable) · Name:  · Address:  · Dialysis Schedule:  · Phone:  · Fax:    / signature: Electronically signed by Riya Prieto RN on 9/30/21 at 10:44 AM EDT    PHYSICIAN SECTION    Prognosis: Fair    Condition at Discharge: Stable    Rehab Potential (if transferring to Rehab): {Prognosis:[id number]}    Recommended Labs or Other Treatments After Discharge:  Recommended Follow-up, Labs or Other Treatments After Discharge:    Home care                Physician Certification: I certify the above information and transfer of [address] [place] [address]  is necessary for the continuing treatment of the diagnosis listed and that she requires Home Care   Update Admission H&P: No change in H&P    PHYSICIAN SIGNATURE:  Electronically signed by MAHAD Lyons CNP on 9/30/21 at 9:03 AM EDT

## 2021-09-29 NOTE — PLAN OF CARE
Problem: OXYGENATION/RESPIRATORY FUNCTION  Goal: Patient will maintain patent airway  Outcome: Ongoing     Problem: HEMODYNAMIC STATUS  Goal: Patient has stable vital signs and fluid balance  Outcome: Ongoing       Patient's EF (Ejection Fraction) is greater than 40%    Heart Failure Medications:  Diuretics[de-identified] Furosemide, Torsemide, Spironolactone, Metalozone, Other, and None    (One of the following REQUIRED for EF <40%/SYSTOLIC FAILURE but MAY be used in EF% >40%/DIASTOLIC FAILURE)        ACE[de-identified] None        ARB[de-identified] Valsartan         ARNI[de-identified] None    (Beta Blockers)  NON- Evidenced Based Beta Blocker (for EF% >40%/DIASTOLIC FAILURE): Metoprolol TARTrate- Lopressor    Evidenced Based Beta Blocker::(REQUIRED for EF% <40%/SYSTOLIC FAILURE) None  . .................................................................................................................................................. Patient's weights and intake/output reviewed: Yes    Patient's Last Weight: 272 lbs obtained by standing scale. First weight upon admission.     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-sided sciatica, CKD (chronic kidney disease) stage 3, GFR 30-59 ml/min ([address] Community acquired pneumonia of left lower lobe of lung, CRI, Detached retina, left, Diverticulosis, DJD (degenerative joint disease), Edema, Fatty liver, GERD (gastroesophageal reflux disease), Herniated lumbar intervertebral disc, Hx stage 2 sacral decubitus ulcer, Hyperlipidemia, Hypertension, Hypothyroid, Morbid obesity with BMI of 40.0-44.9, adult ([facility] BETSY treated with BiPAP, Parkinson's disease ([facility] [facility] Partial symptomatic epilepsy with complex partial seizures, not intractable, without status epilepticus ([facility] Restless leg syndrome, Restless legs syndrome, Rheumatic fever, Sleep apnea, Tremor, essential, Type 2 diabetes mellitus without complication ([facility] and Type II or unspecified type diabetes mellitus without mention of complication, not stated as uncontrolled. >>For CHF and Comorbidity documentation on Education Time and Topics, please see Education Tab    Progressive Mobility Assessment:  What is this patient's Current Level of Mobility?: Ambulatory- with Assistance  How was this patient Mobilized today?:  Up to Toilet/Shower and Up in Room                 With Whom? Nurse and PCA                 Level of Difficulty/Assistance: Independent     Pt resting in bed at this time on room air. Pt denies shortness of breath. Pt with pitting lower extremity edema.      Patient and/or Family's stated Goal of Care this Admission: reduce shortness of breath, increase activity tolerance, better understand heart failure and disease management, be more comfortable, and reduce lower extremity edema prior to discharge        :

## 2021-09-29 NOTE — PROGRESS NOTES
Hospitalist Progress Note      PCP: Eleanor Guardado MD    Date of Admission: 9/27/2021    Chief Complaint: Chest Pain (L sided CP that woke pt up from sleep. Pt on baby Kallie Flores and Eliquis at home.)    Hospital Course: 80 y.o. female who presented to [facility] ED with C/P chest pain that woke the pt from sleep. PT has PMH of A. fib on oral anticoagulation, hypertension hyperlipidemia type II diabetic who presents with sudden onset left-sided chest wall/scapular pain started 2 AM.  Woke her from sleep.  \" Describes a sharp pain that is constant in nature.  May be exacerbated with taking deep breaths otherwise no clear exertional component that she is aware of.  Not associated with any diaphoresis or nausea.  States she feels slightly short of breath but secondary to pain.  No other midline chest or back pain no numbness or weakness no other radiation.  No fevers chills cough or sputum production.  No recent trauma. \"      ED course: ECG none acute, no e-lyte abnormalities, leukocytosis 14,  BP on admission 197/69 on admission. UA - pending. Normal DD and pro calcitonin. Trop normal.   [facility] medicine asked to admit for further evalauation and treatment           Subjective: EMR and notes reviewed pt seen and examined. Feeling better. Up awake sitting up eating lunch .  No further chest pain or pressure       Medications:  Reviewed    Infusion Medications    sodium chloride 25 mL (09/27/21 1840)    dextrose       Scheduled Medications    apixaban  2.5 mg Oral BID    aspirin EC  81 mg Oral Daily    budesonide-formoterol  2 puff Inhalation BID    carbidopa-levodopa  1 tablet Oral 4x Daily    DULoxetine  60 mg Oral Daily    fluticasone  1 spray Each Nostril Daily    furosemide  20 mg Oral Daily    lamoTRIgine  25 mg Oral BID    metoprolol tartrate  50 mg Oral BID    montelukast  10 mg Oral Nightly    NIFEdipine  60 mg Oral Daily    propafenone  150 mg Oral 3 times per day    valsartan  160 mg Oral Daily    sodium chloride flush  5-40 mL IntraVENous 2 times per day    atorvastatin  40 mg Oral Nightly    insulin lispro  0-6 Units SubCUTAneous TID WC    insulin lispro  0-3 Units SubCUTAneous Nightly    cefTRIAXone (ROCEPHIN) IV  1,000 mg IntraVENous Q24H     PRN Meds: albuterol sulfate HFA, ipratropium-albuterol, sodium chloride flush, sodium chloride, ondansetron **OR** ondansetron, acetaminophen **OR** acetaminophen, polyethylene glycol, nitroGLYCERIN, glucose, dextrose, glucagon (rDNA), dextrose      Intake/Output Summary (Last 24 hours) at 9/29/2021 0902  Last data filed at 9/28/2021 2139  Gross per 24 hour   Intake 720 ml   Output 600 ml   Net 120 ml       Physical Exam Performed:    BP (!) 158/59   Pulse 66   Temp 98.1 °F (36.7 °C) (Oral)   Resp 16   Ht 5' 6\" (1.676 m)   Wt 283 lb 3.2 oz (128.5 kg)   SpO2 93%   BMI 45.71 kg/m²     General appearance: No apparent distress, appears stated age and cooperative. HEENT: Pupils equal, round, and reactive to light. Conjunctivae/corneas clear. Neck: Supple, with full range of motion. No jugular venous distention. Trachea midline. Respiratory:  Normal respiratory effort. Clear to auscultation, bilaterally without Rales/Wheezes/Rhonchi. Cardiovascular: Regular rate and rhythm with normal S1/S2 without murmurs, rubs or gallops. Abdomen: Soft, non-tender, non-distended with normal bowel sounds. Musculoskeletal: No clubbing, cyanosis or edema bilaterally. Full range of motion without deformity. Skin: Skin color, texture, turgor normal.  No rashes or lesions. Neurologic:  Neurovascularly intact without any focal sensory/motor deficits.  Cranial nerves: II-XII intact, grossly non-focal. essential tremor   Psychiatric: Alert and oriented, thought content appropriate, normal insight  Capillary Refill: Brisk,3 seconds, normal   Peripheral Pulses: +2 palpable, equal bilaterally       Labs:   Recent Labs     09/27/21  0355 09/28/21  0716   WBC 14.6* 12.1*   HGB 10.7* 9.3*   HCT 33.2* 28.9*    298     Recent Labs     09/27/21  0355      K 5.0   CL 98*   CO2 30   BUN 28*   CREATININE 1.6*   CALCIUM 9.3     Recent Labs     09/27/21  0355   AST 14*   ALT <5*   BILITOT 0.3   ALKPHOS 143*     No results for input(s): INR in the last 72 hours. Recent Labs     09/27/21  1128 09/27/21  1733 09/27/21  1934   TROPONINI <0.01 <0.01 <0.01       Urinalysis:      Lab Results   Component Value Date    NITRU POSITIVE 09/27/2021    WBCUA >100 09/27/2021    BACTERIA 4+ 09/27/2021    RBCUA 0-2 09/27/2021    BLOODU Negative 09/27/2021    SPECGRAV 1.015 09/27/2021    GLUCOSEU Negative 09/27/2021       Radiology:  XR CHEST PORTABLE   Final Result   Prominence of the cardiac silhouette and vasculature. There is likely some   degree of exaggeration by the technique and unsure if there is real vascular   congestion/volume overload. No focal consolidation or pleural effusion. .                 Assessment/Plan:    Active Hospital Problems    Diagnosis     UTI (urinary tract infection) [N39.0]     Chest pain [R07.9]     Chronic kidney disease, stage 4 (severe) ([facility]) [N18.4]     Partial symptomatic epilepsy with complex partial seizures, not intractable, without status epilepticus ([facility] [W40.675]     Diastolic congestive heart failure ([facility] [I50.30]     COPD (chronic obstructive pulmonary disease) ([facility] [J44.9]     PAF (paroxysmal atrial fibrillation) ([facility]) [I48.0]     Parkinson's disease ([facility] [G20]     CAD (coronary artery disease) [I25.10]     Morbid obesity with BMI of 40.0-44.9, adult ([facility] [E66.01, Z68.41]     Moderate persistent asthma [J45.40]     Hypothyroidism [E03.9]     Chronic normocytic anemia [D64.9]     Restless leg syndrome [G25.81]     Hyperlipidemia [E78.5]     Essential hypertension [I10]     GERD (gastroesophageal reflux disease) [K21.9]        Chest Pain: low suspicion of ACS but does have CAD hx with HX.  Certainly,  HTN could be driving her chest pain since she came in with /69  - ecg non- acute, trop normal  - admit for ACS protocol, asa, statin, serial trop   - CXR w/o acute findings   - cardiology consulted- no need for stress or echo as pain is reporducable   - DD - WNL,   - currently on on NOAC     HTN- some urgency on on admission   - continue BP meds, titrate as needed      CAD- as above   - continue asa and cardiac meds      DM - on hx, not currently on meds per Med rec   - check A1C and SSI with FSBS      PAF- currently in NS   - cont NAOC and cardiac meds      HLD: cont statin      Hypothyroidism: check tsh cont home meds      CRLS: cont lamictal      Mod asthma: no exacerbation cont HHN      Parkinson disease: cont synthroid      CKD: stable monitor      Chronic diastolic heart failure   - cont diuretics and cardiac meds      Chronic anemia   - stable      Leukocytosis: uncertain etiology, CXR appears non acute, UA with micro suspicious of UTI  - start Rocephin follow UC            DVT Prophylaxis: NOAC  Diet: ADULT DIET; Regular; 5 carb choices (75 gm/meal);  Low Sodium (2 gm)  Code Status: Full Code    PT/OT Eval Status: ordered    Dispo - can DC to home with Elly Angeles when sensitivity returns   Leticia Gomez, APRN - CNP

## 2021-09-30 VITALS
SYSTOLIC BLOOD PRESSURE: 150 MMHG | WEIGHT: 273.3 LBS | BODY MASS INDEX: 43.92 KG/M2 | DIASTOLIC BLOOD PRESSURE: 75 MMHG | RESPIRATION RATE: 16 BRPM | TEMPERATURE: 98 F | HEIGHT: 66 IN | OXYGEN SATURATION: 92 % | HEART RATE: 71 BPM

## 2021-09-30 LAB
GLUCOSE BLD-MCNC: 113 MG/DL (ref 70–99)
GLUCOSE BLD-MCNC: 130 MG/DL (ref 70–99)
ORGANISM: ABNORMAL
PERFORMED ON: ABNORMAL
PERFORMED ON: ABNORMAL
URINE CULTURE, ROUTINE: ABNORMAL

## 2021-09-30 PROCEDURE — 6370000000 HC RX 637 (ALT 250 FOR IP): Performed by: NURSE PRACTITIONER

## 2021-09-30 PROCEDURE — 94640 AIRWAY INHALATION TREATMENT: CPT

## 2021-09-30 PROCEDURE — 2580000003 HC RX 258: Performed by: NURSE PRACTITIONER

## 2021-09-30 RX ORDER — CEFDINIR 300 MG/1
300 CAPSULE ORAL 2 TIMES DAILY
Qty: 10 CAPSULE | Refills: 0 | Status: SHIPPED | OUTPATIENT
Start: 2021-09-30 | End: 2021-10-05

## 2021-09-30 RX ADMIN — METOPROLOL TARTRATE 50 MG: 50 TABLET, FILM COATED ORAL at 09:02

## 2021-09-30 RX ADMIN — SODIUM CHLORIDE, PRESERVATIVE FREE 10 ML: 5 INJECTION INTRAVENOUS at 09:03

## 2021-09-30 RX ADMIN — LAMOTRIGINE 25 MG: 25 TABLET ORAL at 09:02

## 2021-09-30 RX ADMIN — APIXABAN 2.5 MG: 2.5 TABLET, FILM COATED ORAL at 09:02

## 2021-09-30 RX ADMIN — PROPAFENONE HYDROCHLORIDE 150 MG: 150 TABLET, FILM COATED ORAL at 06:25

## 2021-09-30 RX ADMIN — Medication 2 PUFF: at 08:37

## 2021-09-30 RX ADMIN — CARBIDOPA AND LEVODOPA 1 TABLET: 25; 100 TABLET ORAL at 09:02

## 2021-09-30 RX ADMIN — FUROSEMIDE 20 MG: 20 TABLET ORAL at 09:02

## 2021-09-30 RX ADMIN — FLUTICASONE PROPIONATE 1 SPRAY: 50 SPRAY, METERED NASAL at 09:07

## 2021-09-30 RX ADMIN — ACETAMINOPHEN 650 MG: 325 TABLET ORAL at 06:30

## 2021-09-30 RX ADMIN — ASPIRIN 81 MG: 81 TABLET, COATED ORAL at 09:02

## 2021-09-30 RX ADMIN — VALSARTAN 160 MG: 80 TABLET, FILM COATED ORAL at 09:01

## 2021-09-30 RX ADMIN — NIFEDIPINE 60 MG: 30 TABLET, FILM COATED, EXTENDED RELEASE ORAL at 09:02

## 2021-09-30 RX ADMIN — DULOXETINE HYDROCHLORIDE 60 MG: 60 CAPSULE, DELAYED RELEASE ORAL at 09:01

## 2021-09-30 ASSESSMENT — PAIN SCALES - GENERAL
PAINLEVEL_OUTOF10: 4
PAINLEVEL_OUTOF10: 0

## 2021-09-30 NOTE — PLAN OF CARE
Hypertension, Hypothyroid, Morbid obesity with BMI of 40.0-44.9, adult ([facility] BETSY treated with BiPAP, Parkinson's disease ([facility] Partial symptomatic epilepsy with complex partial seizures, not intractable, without status epilepticus ([facility] Restless leg syndrome, Restless legs syndrome, Rheumatic fever, Sleep apnea, Tremor, essential, Type 2 diabetes mellitus without complication ([facility] and Type II or unspecified type diabetes mellitus without mention of complication, not stated as uncontrolled. >>For CHF and Comorbidity documentation on Education Time and Topics, please see Education Tab    Progressive Mobility Assessment:  What is this patient's Current Level of Mobility?: Ambulatory- with Assistance  How was this patient Mobilized today?: Edge of Bed, Up to Chair,  Up to Toilet/Shower, and Up in Room                 With Whom? Nurse, PCA, PT, and OT                 Level of Difficulty/Assistance: 1x Assist     Pt resting in bed at this time on room air. Pt denies shortness of breath. Pt with pitting lower extremity edema.      Patient and/or Family's stated Goal of Care this Admission: reduce shortness of breath, increase activity tolerance, better understand heart failure and disease management, be more comfortable, and reduce lower extremity edema prior to discharge        :

## 2021-09-30 NOTE — PROGRESS NOTES
Pt d/c'd home with  in personal car with all belongings. Removed peripheral IV and stopped bleeding. Catheter intact. Pt tolerated well. No redness noted at site. Notified [facility] and removed tele box. Reviewed d/c instructions, home meds, and  f/u information utilizing teach-back method. Meds to beds picked up by patient. Patient verbalized understanding. Patient taken to main entrance in wheelchair.

## 2021-09-30 NOTE — PLAN OF CARE
Problem: Falls - Risk of:  Goal: Will remain free from falls  Description: Will remain free from falls  Outcome: Ongoing  Note: Pt will remain free from falls throughout hospital stay. Fall precautions in place, bed alarm on, bed in lowest position with wheels locked and side rails 2/4 up. Room door open and hourly rounding completed. Will continue to monitor throughout shift. Problem: Skin Integrity:  Goal: Will show no infection signs and symptoms  Description: Will show no infection signs and symptoms  Outcome: Ongoing  Note: Pt is at risk for skin breakdown. Pt will have skin assessments every shift, encourage safe ambulation, heels elevated off of the bed, and friction and shear prevented when possible. Will continue to monitor for signs of skin breakdown and enforce prevention measures. Problem: Discharge Planning:  Goal: Discharged to appropriate level of care  Description: Discharged to appropriate level of care  Outcome: Ongoing  Note: Pt will have accuchecks before meals and at bedtime with sliding scale insulin in place for coverage. Will continue to monitor for signs and symptoms of hypoglycemia and hyperglycemia throughout shift.

## 2021-09-30 NOTE — PLAN OF CARE
Problem: OXYGENATION/RESPIRATORY FUNCTION  Goal: Patient will maintain patent airway  Outcome: Ongoing  Note:   Patient's EF (Ejection Fraction) is greater than 40%    Heart Failure Medications:  Diuretics[de-identified] Furosemide    (One of the following REQUIRED for EF <40%/SYSTOLIC FAILURE but MAY be used in EF% >40%/DIASTOLIC FAILURE)        ACE[de-identified] None        ARB[de-identified] Valsartan         ARNI[de-identified] None    (Beta Blockers)  NON- Evidenced Based Beta Blocker (for EF% >40%/DIASTOLIC FAILURE): Metoprolol TARTrate- Lopressor    Evidenced Based Beta Blocker::(REQUIRED for EF% <40%/SYSTOLIC FAILURE) None  . .................................................................................................................................................. Patient's weights and intake/output reviewed: Yes    Patient's Last Weight: 273 lbs obtained by standing scale. Difference of 2 lbs less than last documented weight.       Intake/Output Summary (Last 24 hours) at 9/30/2021 1018  Last data filed at 9/30/2021 7694  Gross per 24 hour   Intake 370 ml   Output 1550 ml   Net -1180 ml       Comorbidities Reviewed Yes    Patient has a past medical history of Anemia, unspecified, Asthma, Atrial fibrillation ([address] Baker's cyst, Basal cell carcinoma of left cheek, Blind left eye, Carotid artery stenoses, Carotid stenosis, Cervical spinal cord compression (HCC), 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-sided sciatica, CKD (chronic kidney disease) stage 3, GFR 30-59 ml/min ([address] Community acquired pneumonia of left lower lobe of lung, CRI, Detached retina, left, Diverticulosis, DJD (degenerative joint disease), Edema, Fatty liver, GERD (gastroesophageal reflux disease), Herniated lumbar intervertebral disc, Hx stage 2 sacral decubitus ulcer, Hyperlipidemia, Hypertension, Hypothyroid, Morbid obesity with BMI of 40.0-44.9, adult ([facility] BETSY treated with BiPAP, Parkinson's disease ([facility] Partial symptomatic epilepsy with complex partial seizures, not

## 2021-09-30 NOTE — CARE COORDINATION
[facility]    DC order noted, all docs needed have been faxed to [facility] for home care services.     Home care to see patient within 24-48 hrs    Cindy Pedroza RN, BSN CTN  [facility] [phone]

## 2021-09-30 NOTE — CARE COORDINATION
CASE MANAGEMENT DISCHARGE SUMMARY      Discharge to: home w/[facility]      IMM given: (date) 09/30/2021    New Durable Medical Equipment ordered/agency: none    Transportation: private   Patient: yes     Family:  yes    Name: Garima Rowe number: in room   RN, name: Jillian Salazar RN    Note: Discharging nurse to complete MICA, reconcile AVS, and place final copy with patient's discharge packet. RN to ensure that written prescriptions for  Level II medications are sent with patient to the facility as per protocol.         Yareli Rutherford RN

## 2021-09-30 NOTE — DISCHARGE SUMMARY
Hospital Medicine Discharge Summary    Patient ID: Marco Olson      Patient's PCP: Wang Rao MD    Admit Date: 9/27/2021     Discharge Date:   9/30/21    Admitting Physician: Maya Pratt MD     Discharge Physician: Ashvin Bartlett, APRN - CNP     Discharge Diagnoses: Active Hospital Problems    Diagnosis     UTI (urinary tract infection) [N39.0]     Chest pain [R07.9]     Chronic kidney disease, stage 4 (severe) ([facility]) [N18.4]     Partial symptomatic epilepsy with complex partial seizures, not intractable, without status epilepticus ([facility] [R23.747]     Diastolic congestive heart failure ([facility] [I50.30]     COPD (chronic obstructive pulmonary disease) ([facility] [J44.9]     PAF (paroxysmal atrial fibrillation) ([facility]) [I48.0]     Parkinson's disease ([facility] [G20]     CAD (coronary artery disease) [I25.10]     Morbid obesity with BMI of 40.0-44.9, adult ([facility] [E66.01, Z68.41]     Moderate persistent asthma [J45.40]     Hypothyroidism [E03.9]     Chronic normocytic anemia [D64.9]     Restless leg syndrome [G25.81]     Hyperlipidemia [E78.5]     Essential hypertension [I10]     GERD (gastroesophageal reflux disease) [K21.9]        The patient was seen and examined on day of discharge and this discharge summary is in conjunction with any daily progress note from day of discharge. Hospital Course:   80 y.o. female who presented to [facility] ED with C/P chest pain that woke the pt from sleep.  PT has PMH of GAYLE gonsalves on oral anticoagulation, hypertension hyperlipidemia type II diabetic who presents with sudden onset left-sided chest wall/scapular pain started 2 AM.  Woke her from sleep.  \" Describes a sharp pain that is constant in nature.  May be exacerbated with taking deep breaths otherwise no clear exertional component that she is aware of.  Not associated with any diaphoresis or nausea.  States she feels slightly short of breath but secondary to pain.  No other midline chest or back pain no numbness or weakness no other radiation.  No fevers chills cough or sputum production.  No recent trauma. \"      ED course: ECG none acute, no e-lyte abnormalities, leukocytosis 14,  BP on admission 197/69 on admission. UA - pending. Normal DD and pro calcitonin. Trop normal.   [facility] medicine asked to admit for further evalauation and treatment   Chest Pain: low suspicion of ACS but does have CAD hx with HX. Certainly,  HTN could be driving her chest pain since she came in with /69  - ecg non- acute, trop normal  - admit for ACS protocol, asa, statin, serial trop   - CXR w/o acute findings   - cardiology consulted- no need for stress or echo as pain is reporducable   - DD - WNL,   - currently on on NOAC     HTN- some urgency on on admission   - continue BP meds, titrate as needed      CAD- as above   - continue asa and cardiac meds      DM - on hx, not currently on meds per Med rec   - check A1C and SSI with FSBS      PAF- currently in NS   - cont NAOC and cardiac meds      HLD: cont statin      Hypothyroidism: check tsh cont home meds      CRLS: cont lamictal      Mod asthma: no exacerbation cont HHN      Parkinson disease: cont synthroid      CKD: stable monitor      Chronic diastolic heart failure   - cont diuretics and cardiac meds      Chronic anemia   - stable      Leukocytosis: uncertain etiology, CXR appears non acute, UA with micro suspicious of UTI  - start Rocephin follow UC                Physical Exam Performed:     BP (!) 150/75   Pulse 71   Temp 98 °F (36.7 °C) (Oral)   Resp 16   Ht 5' 6\" (1.676 m)   Wt 273 lb 4.8 oz (124 kg)   SpO2 92%   BMI 44.11 kg/m²       General appearance:  No apparent distress, appears stated age and cooperative. HEENT:  Normal cephalic, atraumatic without obvious deformity. Pupils equal, round, and reactive to light. Extra ocular muscles intact. Conjunctivae/corneas clear. Neck: Supple, with full range of motion. No jugular venous distention. Trachea midline. Respiratory:  Normal respiratory effort. Clear to auscultation, bilaterally without Rales/Wheezes/Rhonchi. Cardiovascular:  Regular rate and rhythm with normal S1/S2 without murmurs, rubs or gallops. Abdomen: Soft, non-tender, non-distended with normal bowel sounds. Musculoskeletal:  No clubbing, cyanosis or edema bilaterally. Full range of motion without deformity. Skin: Skin color, texture, turgor normal.  No rashes or lesions. Neurologic:  Neurovascularly intact without any focal sensory/motor deficits. Cranial nerves: II-XII intact, grossly non-focal.  Psychiatric:  Alert and oriented, thought content appropriate, normal insight  Capillary Refill: Brisk,< 3 seconds   Peripheral Pulses: +2 palpable, equal bilaterally       Labs: For convenience and continuity at follow-up the following most recent labs are provided:      CBC:    Lab Results   Component Value Date    WBC 12.1 09/28/2021    HGB 9.3 09/28/2021    HCT 28.9 09/28/2021     09/28/2021       Renal:    Lab Results   Component Value Date     09/27/2021    K 5.0 09/27/2021    K 4.9 04/09/2021    CL 98 09/27/2021    CO2 30 09/27/2021    BUN 28 09/27/2021    CREATININE 1.6 09/27/2021    CALCIUM 9.3 09/27/2021    PHOS 4.2 09/08/2021         Significant Diagnostic Studies    Radiology:   XR CHEST PORTABLE   Final Result   Prominence of the cardiac silhouette and vasculature. There is likely some   degree of exaggeration by the technique and unsure if there is real vascular   congestion/volume overload. No focal consolidation or pleural effusion. .                Consults:     IP CONSULT TO HOSPITALIST  IP CONSULT TO CARDIOLOGY  IP CONSULT TO HOME CARE NEEDS    Disposition:  Home     Condition at Discharge: Stable    Discharge Instructions/Follow-up:      Code Status:  Full Code     Activity: activity as tolerated    Diet: cardiac diet      Discharge Medications:     Current Discharge Medication List           Details MULTIVITAMIN-MINERALS) tablet Take 1 tablet by mouth daily      aspirin EC 81 MG EC tablet Take 1 tablet by mouth daily. Start taking next week. Indications: OTC  Qty: 30 tablet, Refills: 2      ferrous sulfate (IRON 325) 325 (65 Fe) MG tablet Take 325 mg by mouth 2 times daily       Handicap Placard MISC by Does not apply route DX: G20  parkinson's disease  Exp: 9/1/2024  Qty: 1 each, Refills: 0    Associated Diagnoses: Parkinson disease ([facility]             Time Spent on discharge is more than 30 minutes in the examination, evaluation, counseling and review of medications and discharge plan. Signed:    MAHAD Foster - CNP   9/30/2021      Thank you Hussain Virk MD for the opportunity to be involved in this patient's care. If you have any questions or concerns please feel free to contact me at [phone].

## 2021-10-01 ENCOUNTER — TELEPHONE (OUTPATIENT)
Dept: FAMILY MEDICINE CLINIC | Age: 81
End: 2021-10-01

## 2021-10-01 ENCOUNTER — CARE COORDINATION (OUTPATIENT)
Dept: CASE MANAGEMENT | Age: 81
End: 2021-10-01

## 2021-10-01 DIAGNOSIS — R07.9 CHEST PAIN, UNSPECIFIED TYPE: Primary | ICD-10-CM

## 2021-10-01 PROCEDURE — 1111F DSCHRG MED/CURRENT MED MERGE: CPT | Performed by: INTERNAL MEDICINE

## 2021-10-01 NOTE — TELEPHONE ENCOUNTER
Anjali with [facility] calling to let us know they couldn't get a hold of patient so she will be a delayed start

## 2021-10-01 NOTE — TELEPHONE ENCOUNTER
Michael 45 Transitions Initial Follow Up Call    Outreach made within 2 business days of discharge: Yes    Patient: Jim Daily Patient : 1940   MRN: <[id number]>  Reason for Admission: There are no discharge diagnoses documented for the most recent discharge. Discharge Date: 21       Spoke with: Patients - called both numbers, no answer. Left message for return call to schedule. Discharge department/facility: [facility]    Non-face-to-face services provided:      TCM Interactive Patient Contact:  Was patient able to fill all prescriptions: Yes  Was patient instructed to bring all medications to the follow-up visit: Yes  Is patient taking all medications as directed in the discharge summary?  Yes  Does patient understand their discharge instructions: Yes  Does patient have questions or concerns that need addressed prior to 7-14 day follow up office visit: no    Scheduled appointment with PCP within 7-14 days    Follow Up  Future Appointments   Date Time Provider Katy Booker   2021  1:30 PM MD BIGG Díaz  [place] - DYD   2021  9:00 AM MAHAD Valdez CNP [facility]   2021  9:00 AM Esa Buitrago MD [facility]   2021  9:30 AM MHC CT MAIN MHCZ CT SC [place] [facility]   2021 10:30 AM Chely Leone MD [facility]       Gilda Gallo RN

## 2021-10-01 NOTE — CARE COORDINATION
patient on any new and changed medications related to discharge diagnosis. CTN provided contact information. Plan for follow-up call in 5-7 days based on severity of symptoms and risk factors. Plan for next call: symptom management-., self management-. and follow up appointment-.          Care Transitions 24 Hour Call    Do you have all of your prescriptions and are they filled?: Yes  Post Acute Services: Home Health (Comment: [facility])  Patient DME: Shower chair, Wily Kand, Wheelchair, Straight cane, Crutches, Other  Other Patient DME: grab bar in shower and around toilet, long handled sponge  Patient Home Equipment: Nebulizer, BiPAP, Oxygen  Do you have support at home?: Partner/Spouse/SO  Are you an active caregiver in your home?: No  Care Transitions Interventions         Follow Up  Future Appointments   Date Time Provider Katy Booker   11/29/2021  1:30 PM MD BIGG Vicente  [place] - DYD   11/30/2021  9:00 AM MAHAD Reyna - CNP [facility]   12/2/2021  9:00 AM MD Darlin Aburto [facility]   12/21/2021  9:30 AM MHC CT MAIN MHCZ CT SC [place] [facility]   12/28/2021 10:30 AM Fred Awad MD [facility]     First outreach call attempt, no answer. CTN left  with contact information and request for return call. CTN will continue with outreach call attempts. Called [facility], spoke with BODØ. She stated they did receive HC orders and she will follow up with pt. Pt  Ken Hurley called and left CTN a voicemail to call pt back. CTN reached out to pt by home phone and spoke with pt. Pt reported she was feeling better but did express being fatigued. She also stated that SOB was normal for her and wasn't experiencing any CP at this time. Pt asked CTN to call  at a later time as she wasn't aware of what medications she took. CTN could not complete med rec or 1111F Per pt she did start her ABX today. HC did reach out to pt and SOC was initiated.  Pt was aware that she had a follow up appointment with PCP 10/6. No needs were needed from CTN at this time but will continue to follow.     2:24pm - called pt  back to verify medications. 1111F completed. No medical questions or concerns from either pt or  at this time.      GORDON CastilloN, RN   Care Transition Nurse  Mobile: [phone]

## 2021-10-01 NOTE — TELEPHONE ENCOUNTER
Rocky Rodriguez 45 Transitions Initial Follow Up Call    Outreach made within 2 business days of discharge: Yes    Patient: Ruth Adams Patient : 1940   MRN: <[id number]>  Reason for Admission: There are no discharge diagnoses documented for the most recent discharge. Discharge Date: 21       Spoke with: Patient's     Discharge department/facility: [facility]     TCM Interactive Patient Contact:  Was patient able to fill all prescriptions: Yes  Was patient instructed to bring all medications to the follow-up visit: Yes  Is patient taking all medications as directed in the discharge summary?  Yes  Does patient understand their discharge instructions: Yes  Does patient have questions or concerns that need addressed prior to 7-14 day follow up office visit: no    Scheduled appointment with PCP within 7-14 days    Follow Up  Future Appointments   Date Time Provider Katy Booker   10/6/2021  9:00 AM [facility]AD [facility] [place] [facility]   2021  1:30 PM Ana Rosa Herrmann MD [facility] [place] - [facility]   2021  9:00 AM MAHAD Jj CNP [facility]   2021  9:00 AM MD Alfredo Henriquez [facility]   2021  9:30 AM MHC CT MAIN MHCZ CT [facility] [place] [facility]   2021 10:30 AM MD JEFFERSON Mon [facility]       Jo Campos

## 2021-10-06 ENCOUNTER — OFFICE VISIT (OUTPATIENT)
Dept: FAMILY MEDICINE CLINIC | Age: 81
End: 2021-10-06
Payer: MEDICARE

## 2021-10-06 VITALS
SYSTOLIC BLOOD PRESSURE: 130 MMHG | DIASTOLIC BLOOD PRESSURE: 72 MMHG | WEIGHT: 273 LBS | BODY MASS INDEX: 44.06 KG/M2 | OXYGEN SATURATION: 91 % | HEART RATE: 73 BPM

## 2021-10-06 DIAGNOSIS — N39.0 URINARY TRACT INFECTION WITHOUT HEMATURIA, SITE UNSPECIFIED: ICD-10-CM

## 2021-10-06 DIAGNOSIS — R07.9 CHEST PAIN, UNSPECIFIED TYPE: ICD-10-CM

## 2021-10-06 DIAGNOSIS — R19.7 DIARRHEA, UNSPECIFIED TYPE: Primary | ICD-10-CM

## 2021-10-06 DIAGNOSIS — D72.829 LEUKOCYTOSIS, UNSPECIFIED TYPE: ICD-10-CM

## 2021-10-06 DIAGNOSIS — N28.9 DECREASED RENAL FUNCTION: ICD-10-CM

## 2021-10-06 PROCEDURE — 99495 TRANSJ CARE MGMT MOD F2F 14D: CPT | Performed by: NURSE PRACTITIONER

## 2021-10-06 PROCEDURE — 1111F DSCHRG MED/CURRENT MED MERGE: CPT | Performed by: NURSE PRACTITIONER

## 2021-10-06 ASSESSMENT — ENCOUNTER SYMPTOMS
NAUSEA: 0
ABDOMINAL DISTENTION: 0
CONSTIPATION: 0
WHEEZING: 0
ABDOMINAL PAIN: 0
VOMITING: 0
BLOOD IN STOOL: 0
EYES NEGATIVE: 1
CHEST TIGHTNESS: 0
SHORTNESS OF BREATH: 0
COUGH: 0
DIARRHEA: 0

## 2021-10-06 NOTE — PROGRESS NOTES
Post-Discharge Transitional Care Management Services or Hospital Follow Up      Augustine Fritz   YOB: 1940    Date of Office Visit:  10/6/2021  Date of Hospital Admission: 9/27/21  Date of Hospital Discharge: 9/30/21  Readmission Risk Score(high >=14%.  Medium >=10%):Readmission Risk Score: 19      Care management risk score Rising risk (score 2-5) and Complex Care (Scores >=6): 17     Non face to face  following discharge, date last encounter closed (first attempt may have been earlier): 10/1/2021  2:25 PM 10/1/2021  2:25 PM    Call initiated 2 business days of discharge: Yes     Patient Active Problem List   Diagnosis    GERD (gastroesophageal reflux disease)    Essential hypertension    Hyperlipidemia    Restless leg syndrome    Chronic normocytic anemia    Postural dizziness    Moderate persistent asthma    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-sided sciatica    Morbid obesity with BMI of 40.0-44.9, adult ([facility]    Type 2 diabetes mellitus with stage 4 chronic kidney disease, without long-term current use of insulin ([facility]    CAD (coronary artery disease)    Parkinson's disease ([facility]    Severe obstructive sleep apnea    Hypothyroidism    Hepatic steatosis    Diverticulosis    Blind left eye    L carotid artery stenosis s/p CEA    PAF (paroxysmal atrial fibrillation) (HCC)    COPD (chronic obstructive pulmonary disease) (HCC)    Diastolic congestive heart failure (HCC)    Partial symptomatic epilepsy with complex partial seizures, not intractable, without status epilepticus ([facility]    Chronic [facility], stage 4 (severe) [facility]    Colonic pseudoobstruction    Sialadenitis    Chest pain    Pulmonary HTN ([address]    Mitral valve insufficiency    UTI (urinary tract infection)       Allergies   Allergen Reactions    Levaquin [Levofloxacin]      Pt was given in er and developed hives and redness    Metoclopramide     Phenazopyridine     Pyridium [Phenazopyridine Hcl]  Reglan [Metoclopramide Hcl]     Reglan [Metoclopramide Hcl]      tremors       Medications listed as ordered at the time of discharge from hospital   Brandy Lungflorencia \"Ritu\"   Home Medication Instructions JAMILA:    Printed on:10/06/21 0987   Medication Information                      albuterol sulfate  (90 Base) MCG/ACT inhaler  INHALE 2 PUFFS INTO THE LUNGS EVERY 6 HOURS AS NEEDED FOR WHEEZING             Alpha-Lipoic Acid 300 MG TABS  Take by mouth daily             apixaban (ELIQUIS) 2.5 MG TABS tablet  Take 1 tablet by mouth 2 times daily             aspirin EC 81 MG EC tablet  Take 1 tablet by mouth daily. Start taking next week.   Indications: OTC             atorvastatin (LIPITOR) 40 MG tablet  Take 1.5 tablets by mouth daily             BREO ELLIPTA 100-25 MCG/INH AEPB inhaler  INHALE 1 PUFF EVERY DAY             carbidopa-levodopa (SINEMET)  MG per tablet  Take 1 tablet by mouth 4 times daily             DULoxetine (CYMBALTA) 60 MG extended release capsule  TAKE 1 CAPSULE EVERY DAY             ferrous sulfate (IRON 325) 325 (65 Fe) MG tablet  Take 325 mg by mouth 2 times daily              fluticasone (FLONASE) 50 MCG/ACT nasal spray  USE 2 PUFFS IN EACH NOSTRIL DAILY             furosemide (LASIX) 40 MG tablet  Take 0.5 tablets by mouth daily             Handicap Placard MISC  by Does not apply route DX: G20  parkinson's disease  Exp: 9/1/2024             ipratropium-albuterol (DUONEB) 0.5-2.5 (3) MG/3ML SOLN nebulizer solution  Inhale 3 mLs into the lungs every 6 hours as needed for Shortness of Breath DX: J45.40             lamoTRIgine (LAMICTAL) 25 MG tablet  Take 25 mg by mouth 2 times daily 2 tabs BID             metoprolol tartrate (LOPRESSOR) 50 MG tablet  TAKE 1 TABLET TWICE DAILY             montelukast (SINGULAIR) 10 MG tablet  TAKE 1 TABLET EVERY DAY             Multiple Vitamins-Minerals (THERAPEUTIC MULTIVITAMIN-MINERALS) tablet  Take 1 tablet by mouth daily Inhale 3 mLs into the lungs every 6 hours as needed for Shortness of Breath DX: J45.40 360 mL 1    Multiple Vitamins-Minerals (THERAPEUTIC MULTIVITAMIN-MINERALS) tablet Take 1 tablet by mouth daily      aspirin EC 81 MG EC tablet Take 1 tablet by mouth daily. Start taking next week. Indications: OTC 30 tablet 2        Medications patient taking as of now reconciled against medications ordered at time of hospital discharge: Yes    Chief Complaint   Patient [place]   [address] [place] [facility] [place], sharp pain that is constant left-side chest pain    Urinary Tract Infection    Diarrhea     finished antibiotics yesterday       HPI    Inpatient course: Discharge summary reviewed- see chart. Interval history/Current status:   Presenting for hospital f/u for UTI and chest pain. Trops were negative in hospital as well as CXR. Had UTI treated antibiotics, completed 5 days of Cefdinir, finished yesterday. C/o diarrhea up to 3 times a day since being on antibiotics. Consistency is watery. Appetite is very poor, only tolerating minimal intake due to frequent diarrhea. Denies blood or mucous in stool. Bp running ok at home per , denies low bps. Mild abdominal cramping with some episodes of incontinence. Mild lightheadedness/dizziness, and sob a little worse then usual. Denies fevers, body aches, chills. Chest pain has resolved. Uses a walker and BSC. Denies any urinary symptoms, no dysuria, flank pain, urgency/frequency. Review of Systems   Constitutional: Positive for appetite change. Negative for activity change, chills, diaphoresis, fatigue, fever and unexpected weight change. HENT: Negative. Eyes: Negative. Respiratory: Negative for cough, chest tightness, shortness of breath and wheezing. Cardiovascular: Positive for leg swelling. Negative for chest pain and palpitations.    Gastrointestinal: Negative for abdominal distention, abdominal pain, blood in stool, constipation, diarrhea, nausea and vomiting. Abdominal cramping   Genitourinary: Negative. Negative for decreased urine volume, difficulty urinating, dysuria, flank pain, frequency, pelvic pain and urgency. Musculoskeletal: Negative. Negative for arthralgias and gait problem. Skin: Negative. Neurological: Negative. Negative for dizziness, syncope, weakness, light-headedness, numbness and headaches. Psychiatric/Behavioral: Negative. Vitals:    10/06/21 0900   BP: 130/72   Pulse: 73   SpO2: 91%   Weight: 273 lb (123.8 kg)     Body mass index is 44.06 kg/m². Wt Readings from Last 3 Encounters:   10/06/21 273 lb (123.8 kg)   09/30/21 273 lb 4.8 oz (124 kg)   08/20/21 271 lb (122.9 kg)     BP Readings from Last 3 Encounters:   10/06/21 130/72   09/30/21 (!) 150/75   08/20/21 112/74       Physical Exam  Vitals reviewed. Constitutional:       Appearance: Normal appearance. She is normal weight. HENT:      Head: Normocephalic and atraumatic. Nose: Nose normal.   Eyes:      Conjunctiva/sclera: Conjunctivae normal.   Cardiovascular:      Rate and Rhythm: Normal rate and regular rhythm. Pulses: Normal pulses. Heart sounds: Normal heart sounds. Pulmonary:      Effort: Pulmonary effort is normal. No respiratory distress. Breath sounds: Normal breath sounds. No wheezing or rhonchi. Abdominal:      General: There is no distension. Palpations: Abdomen is soft. Tenderness: There is no abdominal tenderness. There is no right CVA tenderness, left CVA tenderness or guarding. Comments: Bowel sounds hyperactive   Musculoskeletal:         General: Normal range of motion. Cervical back: Normal range of motion and neck supple. Right lower leg: Edema (non-pitting) present. Left lower leg: Edema (non pitting) present. Skin:     General: Skin is warm and dry. Capillary Refill: Capillary refill takes less than 2 seconds.    Neurological:      General: No focal deficit present. Mental Status: She is alert and oriented to person, place, and time. Mental status is at baseline. Psychiatric:         Mood and Affect: Mood normal.         Behavior: Behavior normal.         Thought Content: Thought content normal.         Judgment: Judgment normal.             Assessment/Plan:  1. Diarrhea, unspecified type  - BASIC METABOLIC PANEL; Future  - CBC WITH AUTO DIFFERENTIAL; Future  - C DIFF TOXIN/ANTIGEN; Future  - GI Bacterial Pathogens By PCR; Future  -Rule out bacterial etiology for diarrhea. If negative, pt can start imodium as needed. -If appetite continues to be poor may consider IV hydration. 2. Leukocytosis, unspecified type  - CBC WITH AUTO DIFFERENTIAL; Future  - GI Bacterial Pathogens By PCR; Future  -Recheck CBC today, elevated WBC and anemic during hospitalization. 3. Decreased renal function  - BASIC METABOLIC PANEL; Future  -Recheck today. 4. Urinary tract infection without hematuria, site unspecified  -Completed antibiotics, asymptomatic    5.  Chest pain, unspecified type  -Resolved        Medical Decision Making: moderate complexity

## 2021-10-07 ENCOUNTER — TELEPHONE (OUTPATIENT)
Dept: FAMILY MEDICINE CLINIC | Age: 81
End: 2021-10-07

## 2021-10-07 ENCOUNTER — APPOINTMENT (OUTPATIENT)
Dept: GENERAL RADIOLOGY | Age: 81
DRG: 372 | End: 2021-10-07
Payer: MEDICARE

## 2021-10-07 ENCOUNTER — APPOINTMENT (OUTPATIENT)
Dept: CT IMAGING | Age: 81
DRG: 372 | End: 2021-10-07
Payer: MEDICARE

## 2021-10-07 ENCOUNTER — CARE COORDINATION (OUTPATIENT)
Dept: CASE MANAGEMENT | Age: 81
End: 2021-10-07

## 2021-10-07 ENCOUNTER — HOSPITAL ENCOUNTER (OUTPATIENT)
Age: 81
Setting detail: SPECIMEN
Discharge: HOME | End: 2021-10-07
Payer: MEDICARE

## 2021-10-07 ENCOUNTER — HOSPITAL ENCOUNTER (INPATIENT)
Age: 81
LOS: 3 days | Discharge: HOME | DRG: 372 | End: 2021-10-10
Attending: STUDENT IN AN ORGANIZED HEALTH CARE EDUCATION/TRAINING PROGRAM | Admitting: INTERNAL MEDICINE
Payer: MEDICARE

## 2021-10-07 DIAGNOSIS — A04.72 C. DIFFICILE COLITIS: Primary | ICD-10-CM

## 2021-10-07 DIAGNOSIS — R91.8 PULMONARY NODULES: ICD-10-CM

## 2021-10-07 DIAGNOSIS — R19.7 DIARRHEA, UNSPECIFIED TYPE: ICD-10-CM

## 2021-10-07 DIAGNOSIS — D72.829 LEUKOCYTOSIS, UNSPECIFIED TYPE: Primary | ICD-10-CM

## 2021-10-07 DIAGNOSIS — A49.8 CLOSTRIDIUM DIFFICILE INFECTION: Primary | ICD-10-CM

## 2021-10-07 DIAGNOSIS — A09 DIARRHEA OF INFECTIOUS ORIGIN: ICD-10-CM

## 2021-10-07 DIAGNOSIS — S81.812A LACERATION OF LEFT LOWER EXTREMITY, INITIAL ENCOUNTER: ICD-10-CM

## 2021-10-07 DIAGNOSIS — D72.829 LEUKOCYTOSIS, UNSPECIFIED TYPE: ICD-10-CM

## 2021-10-07 DIAGNOSIS — E86.0 DEHYDRATION: ICD-10-CM

## 2021-10-07 DIAGNOSIS — N17.9 ACUTE KIDNEY INJURY SUPERIMPOSED ON CHRONIC KIDNEY DISEASE (HCC): ICD-10-CM

## 2021-10-07 DIAGNOSIS — J96.21 ACUTE ON CHRONIC RESPIRATORY FAILURE WITH HYPOXIA (HCC): ICD-10-CM

## 2021-10-07 DIAGNOSIS — N18.9 ACUTE KIDNEY INJURY SUPERIMPOSED ON CHRONIC KIDNEY DISEASE (HCC): ICD-10-CM

## 2021-10-07 DIAGNOSIS — A49.8 CLOSTRIDIUM DIFFICILE INFECTION: ICD-10-CM

## 2021-10-07 DIAGNOSIS — R35.0 URINARY FREQUENCY: ICD-10-CM

## 2021-10-07 LAB
A/G RATIO: 1.1 (ref 1.1–2.2)
ALBUMIN SERPL-MCNC: 3.5 G/DL (ref 3.4–5)
ALP BLD-CCNC: 111 U/L (ref 40–129)
ALT SERPL-CCNC: <5 U/L (ref 10–40)
ANION GAP SERPL CALCULATED.3IONS-SCNC: 16 MMOL/L (ref 3–16)
AST SERPL-CCNC: 12 U/L (ref 15–37)
BASOPHILS ABSOLUTE: 0 K/UL (ref 0–0.2)
BASOPHILS RELATIVE PERCENT: 0.2 %
BILIRUB SERPL-MCNC: <0.2 MG/DL (ref 0–1)
BUN BLDV-MCNC: 30 MG/DL (ref 7–20)
C DIFF TOXIN/ANTIGEN: ABNORMAL
CALCIUM SERPL-MCNC: 8.8 MG/DL (ref 8.3–10.6)
CHLORIDE BLD-SCNC: 99 MMOL/L (ref 99–110)
CO2: 20 MMOL/L (ref 21–32)
CREAT SERPL-MCNC: 2 MG/DL (ref 0.6–1.2)
EKG ATRIAL RATE: 70 BPM
EKG DIAGNOSIS: NORMAL
EKG P AXIS: 52 DEGREES
EKG P-R INTERVAL: 168 MS
EKG Q-T INTERVAL: 420 MS
EKG QRS DURATION: 118 MS
EKG QTC CALCULATION (BAZETT): 453 MS
EKG R AXIS: 50 DEGREES
EKG T AXIS: 71 DEGREES
EKG VENTRICULAR RATE: 70 BPM
EOSINOPHILS ABSOLUTE: 0.2 K/UL (ref 0–0.6)
EOSINOPHILS RELATIVE PERCENT: 1.9 %
GFR AFRICAN AMERICAN: 29
GFR NON-AFRICAN AMERICAN: 24
GLOBULIN: 3.3 G/DL
GLUCOSE BLD-MCNC: 94 MG/DL (ref 70–99)
HCT VFR BLD CALC: 29.4 % (ref 36–48)
HEMOGLOBIN: 9.1 G/DL (ref 12–16)
LACTIC ACID, SEPSIS: 0.5 MMOL/L (ref 0.4–1.9)
LIPASE: 17 U/L (ref 13–60)
LYMPHOCYTES ABSOLUTE: 1.2 K/UL (ref 1–5.1)
LYMPHOCYTES RELATIVE PERCENT: 9.2 %
MCH RBC QN AUTO: 27.5 PG (ref 26–34)
MCHC RBC AUTO-ENTMCNC: 30.8 G/DL (ref 31–36)
MCV RBC AUTO: 89.3 FL (ref 80–100)
MONOCYTES ABSOLUTE: 0.8 K/UL (ref 0–1.3)
MONOCYTES RELATIVE PERCENT: 6.5 %
NEUTROPHILS ABSOLUTE: 10.5 K/UL (ref 1.7–7.7)
NEUTROPHILS RELATIVE PERCENT: 82.2 %
PDW BLD-RTO: 14.4 % (ref 12.4–15.4)
PLATELET # BLD: 342 K/UL (ref 135–450)
PMV BLD AUTO: 7.9 FL (ref 5–10.5)
POTASSIUM REFLEX MAGNESIUM: 3.7 MMOL/L (ref 3.5–5.1)
PRO-BNP: 1354 PG/ML (ref 0–449)
RBC # BLD: 3.29 M/UL (ref 4–5.2)
SARS-COV-2, NAAT: NOT DETECTED
SODIUM BLD-SCNC: 135 MMOL/L (ref 136–145)
TOTAL PROTEIN: 6.8 G/DL (ref 6.4–8.2)
TROPONIN: <0.01 NG/ML
WBC # BLD: 12.8 K/UL (ref 4–11)

## 2021-10-07 PROCEDURE — 6370000000 HC RX 637 (ALT 250 FOR IP): Performed by: PHYSICIAN ASSISTANT

## 2021-10-07 PROCEDURE — 2580000003 HC RX 258: Performed by: PHYSICIAN ASSISTANT

## 2021-10-07 PROCEDURE — 99285 EMERGENCY DEPT VISIT HI MDM: CPT

## 2021-10-07 PROCEDURE — 74176 CT ABD & PELVIS W/O CONTRAST: CPT

## 2021-10-07 PROCEDURE — 71045 X-RAY EXAM CHEST 1 VIEW: CPT

## 2021-10-07 PROCEDURE — 80053 COMPREHEN METABOLIC PANEL: CPT

## 2021-10-07 PROCEDURE — 84484 ASSAY OF TROPONIN QUANT: CPT

## 2021-10-07 PROCEDURE — 0HQLXZZ REPAIR LEFT LOWER LEG SKIN, EXTERNAL APPROACH: ICD-10-PCS | Performed by: STUDENT IN AN ORGANIZED HEALTH CARE EDUCATION/TRAINING PROGRAM

## 2021-10-07 PROCEDURE — 93005 ELECTROCARDIOGRAM TRACING: CPT | Performed by: PHYSICIAN ASSISTANT

## 2021-10-07 PROCEDURE — 1200000000 HC SEMI PRIVATE

## 2021-10-07 PROCEDURE — 2500000003 HC RX 250 WO HCPCS: Performed by: INTERNAL MEDICINE

## 2021-10-07 PROCEDURE — 2500000003 HC RX 250 WO HCPCS: Performed by: PHYSICIAN ASSISTANT

## 2021-10-07 PROCEDURE — 12002 RPR S/N/AX/GEN/TRNK2.6-7.5CM: CPT

## 2021-10-07 PROCEDURE — 87324 CLOSTRIDIUM AG IA: CPT

## 2021-10-07 PROCEDURE — 93010 ELECTROCARDIOGRAM REPORT: CPT | Performed by: INTERNAL MEDICINE

## 2021-10-07 PROCEDURE — 87449 NOS EACH ORGANISM AG IA: CPT

## 2021-10-07 PROCEDURE — 96374 THER/PROPH/DIAG INJ IV PUSH: CPT

## 2021-10-07 PROCEDURE — 87505 NFCT AGENT DETECTION GI: CPT

## 2021-10-07 PROCEDURE — 85025 COMPLETE CBC W/AUTO DIFF WBC: CPT

## 2021-10-07 PROCEDURE — 83690 ASSAY OF LIPASE: CPT

## 2021-10-07 PROCEDURE — 87635 SARS-COV-2 COVID-19 AMP PRB: CPT

## 2021-10-07 PROCEDURE — 83880 ASSAY OF NATRIURETIC PEPTIDE: CPT

## 2021-10-07 PROCEDURE — 6370000000 HC RX 637 (ALT 250 FOR IP): Performed by: INTERNAL MEDICINE

## 2021-10-07 PROCEDURE — 83605 ASSAY OF LACTIC ACID: CPT

## 2021-10-07 RX ORDER — DEXTROSE, SODIUM CHLORIDE, AND POTASSIUM CHLORIDE 5; .45; .15 G/100ML; G/100ML; G/100ML
INJECTION INTRAVENOUS CONTINUOUS
Status: DISCONTINUED | OUTPATIENT
Start: 2021-10-07 | End: 2021-10-10 | Stop reason: HOSPADM

## 2021-10-07 RX ORDER — VANCOMYCIN HYDROCHLORIDE 125 MG/1
125 CAPSULE ORAL 4 TIMES DAILY
Qty: 40 CAPSULE | Refills: 0 | Status: ON HOLD
Start: 2021-10-07 | End: 2021-10-10 | Stop reason: HOSPADM

## 2021-10-07 RX ORDER — LACTOBACILLUS RHAMNOSUS GG 10B CELL
1 CAPSULE ORAL
Status: DISCONTINUED | OUTPATIENT
Start: 2021-10-08 | End: 2021-10-10 | Stop reason: HOSPADM

## 2021-10-07 RX ORDER — SODIUM CHLORIDE 9 MG/ML
25 INJECTION, SOLUTION INTRAVENOUS PRN
Status: DISCONTINUED | OUTPATIENT
Start: 2021-10-07 | End: 2021-10-10 | Stop reason: HOSPADM

## 2021-10-07 RX ORDER — DULOXETIN HYDROCHLORIDE 60 MG/1
60 CAPSULE, DELAYED RELEASE ORAL DAILY
Status: DISCONTINUED | OUTPATIENT
Start: 2021-10-07 | End: 2021-10-10 | Stop reason: HOSPADM

## 2021-10-07 RX ORDER — VANCOMYCIN HYDROCHLORIDE 125 MG/1
125 CAPSULE ORAL 4 TIMES DAILY
Qty: 40 CAPSULE | Refills: 0 | Status: ON HOLD | OUTPATIENT
Start: 2021-10-07 | End: 2021-10-10 | Stop reason: SDUPTHER

## 2021-10-07 RX ORDER — LAMOTRIGINE 25 MG/1
25 TABLET ORAL 2 TIMES DAILY
Status: DISCONTINUED | OUTPATIENT
Start: 2021-10-07 | End: 2021-10-10 | Stop reason: HOSPADM

## 2021-10-07 RX ORDER — ATORVASTATIN CALCIUM 40 MG/1
40 TABLET, FILM COATED ORAL NIGHTLY
Status: DISCONTINUED | OUTPATIENT
Start: 2021-10-07 | End: 2021-10-10 | Stop reason: HOSPADM

## 2021-10-07 RX ORDER — 0.9 % SODIUM CHLORIDE 0.9 %
1000 INTRAVENOUS SOLUTION INTRAVENOUS ONCE
Status: COMPLETED | OUTPATIENT
Start: 2021-10-07 | End: 2021-10-07

## 2021-10-07 RX ORDER — POLYETHYLENE GLYCOL 3350 17 G/17G
17 POWDER, FOR SOLUTION ORAL DAILY PRN
Status: DISCONTINUED | OUTPATIENT
Start: 2021-10-07 | End: 2021-10-10 | Stop reason: HOSPADM

## 2021-10-07 RX ORDER — SODIUM CHLORIDE 0.9 % (FLUSH) 0.9 %
5-40 SYRINGE (ML) INJECTION PRN
Status: DISCONTINUED | OUTPATIENT
Start: 2021-10-07 | End: 2021-10-10 | Stop reason: HOSPADM

## 2021-10-07 RX ORDER — SODIUM CHLORIDE 0.9 % (FLUSH) 0.9 %
5-40 SYRINGE (ML) INJECTION EVERY 12 HOURS SCHEDULED
Status: DISCONTINUED | OUTPATIENT
Start: 2021-10-07 | End: 2021-10-10 | Stop reason: HOSPADM

## 2021-10-07 RX ORDER — CHOLESTYRAMINE 4 G/9G
1 POWDER, FOR SUSPENSION ORAL 2 TIMES DAILY
Status: DISCONTINUED | OUTPATIENT
Start: 2021-10-07 | End: 2021-10-10 | Stop reason: HOSPADM

## 2021-10-07 RX ORDER — ACETAMINOPHEN 650 MG/1
650 SUPPOSITORY RECTAL EVERY 6 HOURS PRN
Status: DISCONTINUED | OUTPATIENT
Start: 2021-10-07 | End: 2021-10-10 | Stop reason: HOSPADM

## 2021-10-07 RX ORDER — ONDANSETRON 4 MG/1
4 TABLET, ORALLY DISINTEGRATING ORAL EVERY 8 HOURS PRN
Status: DISCONTINUED | OUTPATIENT
Start: 2021-10-07 | End: 2021-10-10 | Stop reason: HOSPADM

## 2021-10-07 RX ORDER — ACETAMINOPHEN 325 MG/1
650 TABLET ORAL EVERY 6 HOURS PRN
Status: DISCONTINUED | OUTPATIENT
Start: 2021-10-07 | End: 2021-10-10 | Stop reason: HOSPADM

## 2021-10-07 RX ORDER — MONTELUKAST SODIUM 10 MG/1
10 TABLET ORAL NIGHTLY
Status: DISCONTINUED | OUTPATIENT
Start: 2021-10-07 | End: 2021-10-10 | Stop reason: HOSPADM

## 2021-10-07 RX ORDER — ONDANSETRON 2 MG/ML
4 INJECTION INTRAMUSCULAR; INTRAVENOUS EVERY 6 HOURS PRN
Status: DISCONTINUED | OUTPATIENT
Start: 2021-10-07 | End: 2021-10-10 | Stop reason: HOSPADM

## 2021-10-07 RX ORDER — PROPAFENONE HYDROCHLORIDE 150 MG/1
150 TABLET, FILM COATED ORAL EVERY 8 HOURS SCHEDULED
Status: DISCONTINUED | OUTPATIENT
Start: 2021-10-07 | End: 2021-10-10 | Stop reason: HOSPADM

## 2021-10-07 RX ADMIN — SODIUM CHLORIDE 1000 ML: 9 INJECTION, SOLUTION INTRAVENOUS at 19:31

## 2021-10-07 RX ADMIN — METRONIDAZOLE 500 MG: 500 INJECTION, SOLUTION INTRAVENOUS at 22:14

## 2021-10-07 RX ADMIN — ATORVASTATIN CALCIUM 40 MG: 40 TABLET, FILM COATED ORAL at 22:07

## 2021-10-07 RX ADMIN — PROPAFENONE HYDROCHLORIDE 150 MG: 150 TABLET, FILM COATED ORAL at 22:06

## 2021-10-07 RX ADMIN — CARBIDOPA AND LEVODOPA 1 TABLET: 25; 100 TABLET ORAL at 22:07

## 2021-10-07 RX ADMIN — MONTELUKAST SODIUM 10 MG: 10 TABLET, COATED ORAL at 22:07

## 2021-10-07 RX ADMIN — METOPROLOL TARTRATE 25 MG: 25 TABLET, FILM COATED ORAL at 22:07

## 2021-10-07 RX ADMIN — CHOLESTYRAMINE 4 G: 4 POWDER, FOR SUSPENSION ORAL at 22:06

## 2021-10-07 RX ADMIN — METRONIDAZOLE 500 MG: 500 INJECTION, SOLUTION INTRAVENOUS at 19:34

## 2021-10-07 RX ADMIN — Medication 250 MG: at 20:43

## 2021-10-07 RX ADMIN — LAMOTRIGINE 25 MG: 25 TABLET ORAL at 22:07

## 2021-10-07 RX ADMIN — APIXABAN 2.5 MG: 5 TABLET, FILM COATED ORAL at 22:07

## 2021-10-07 ASSESSMENT — PAIN SCALES - GENERAL: PAINLEVEL_OUTOF10: 4

## 2021-10-07 ASSESSMENT — ENCOUNTER SYMPTOMS
SHORTNESS OF BREATH: 0
DIARRHEA: 1
ABDOMINAL PAIN: 1
COUGH: 0
RECENT COUGH: 0
VOMITING: 0

## 2021-10-07 NOTE — PROGRESS NOTES
Spoke with patient and . They had gotten my message regarding Positive C. Diff test, and are on the way to the ER due to Pt not tolerating anything by mouth. I agree with plan due to FARHEEN on CKD, and c.diff making her higher risk. Talked to pt and  regarding isolation precautions and that c. Diff is contagious. Educated on hand hygiene, and cleaning at home. Will send oral vanc to [facility] as well because they have good rx card.

## 2021-10-07 NOTE — TELEPHONE ENCOUNTER
informed   He is going to call the pharmacy and see how much it is and see if able to pay out of pocket.  He will let us know if too expensive to run the PA

## 2021-10-07 NOTE — PROGRESS NOTES
Placed orders for oral vancomycin 125 mg four times daily. Per guidelines no renal adjustments needed for oral vancomycin due to low systemic absorption. FARHEEN on CKD. Low threshold for ER. Called and left voicemail. Awaiting call back. PA sent for vanc. Script sent. If patient's intake not improving would recommend ED.

## 2021-10-07 NOTE — TELEPHONE ENCOUNTER
Initiated Prior Authorization per providers request.    vancomycin (VANCOCIN) 125 MG capsule       Patient has [facility] Medicare Part D id# [id number], no group #    Phone [phone]    Ref# [id number]    Status is PENDING    Call [phone] for update on status, confirmation will be faxed.

## 2021-10-07 NOTE — CARE COORDINATION
[facility] Transitions Follow Up Call    10/7/2021    Patient: Roxanne Padilla  Patient : 1940   MRN: [id number]  Reason for Admission:   Discharge Date: 21 RARS: Readmission Risk Score: 19       Patient's  answered the phone and reported patient was not doing well and that he was getting ready to take patient back to the ER. Care Transitions Subsequent and Final Call    Subsequent and Final Calls  Are you currently active with any services?: Home Health  Care Transitions Interventions  Other Interventions:            Follow Up  Future Appointments   Date Time Provider Katy Booker   10/20/2021 10:00 AM MAHAD Clark CNP [facility]   2021  1:30 PM Mercy Bashir MD [facility]   2021  9:00 AM MAHAD Street CNP [facility]   2021  9:00 AM MD Sunita Draper [facility]   2021  9:30 AM [facility]   2021 10:30 AM MD JEFFERSON Minaya [facility]       Branden Callaway, LAKIA

## 2021-10-07 NOTE — TELEPHONE ENCOUNTER
[facility] lab calling with pts lab results.    Cdiff antigen and toxin are both positive

## 2021-10-07 NOTE — ED PROVIDER NOTES
I independently examined and evaluated Isamar Woodson. All diagnostic, treatment, and disposition decisions were made by myself in conjunction with the advanced practice provider. For all further details of the patient's emergency department visit, please see the advanced practice provider's documentation. Primary Care Physician: Candy Selby MD    History: This is a 80 y.o. female who presents to the Emergency Department with complaint of patient arrives due to generalized weakness fatigue decreased appetite, has been having a mild generalized abdominal cramping with profuse watery diarrhea reported had outpatient studies that confirmed C. difficile. Had recently been on antibiotics prior to this for urinary tract infection. Patient also being evaluated for laceration to left leg, injured while in route to here on car door. Patient is on 2 L here due to mild hypoxia 89% on arrival, wears oxygen at night only. Denies shortness of breath or chest pain complaint    Physical:     temperature is 98.1 °F (36.7 °C). Her blood pressure is 116/63 and her pulse is 73. Her respiration is 16 and oxygen saturation is 89% (abnormal). 80 y.o. female   Alert oriented  Heart regular rhythm  Trace rales at bases  Abdomen soft minimally tender    Impression: C. difficile, dehydration. Leg laceration    Plan: Laboratory eval, possible admit      CRITICAL CARE: There was a high probability of clinically significant/life threatening deterioration in this patient's condition which required my urgent intervention. Total critical care time was 0 minutes. This excludes any time for separately reportable procedures. Eliel Lim DO  Emergency Physician        Comment: Please note this report has been produced using speech recognition software and may contain errors related to that system including errors in grammar, punctuation, and spelling, as well as words and phrases that may be inappropriate.  If there are any questions or concerns please feel free to contact the dictating provider for clarification.           Kam Monroe, DO  10/07/21 1817

## 2021-10-07 NOTE — CARE COORDINATION
[facility]  [facility]. Pt is active with [facility] the [facility]. Liaison will continue to follow for [facility]. needs until discharge.       Electronically signed by Syeda Lee RN on 10/7/2021 at 4:42 PM

## 2021-10-07 NOTE — TELEPHONE ENCOUNTER
Please call patient and let them know results. Oral [facility]. Needs prior authorization, is there a way to expedite this? Pt needs to push fluids, if unable to do so may need to go to ER. Her renal function worsened from prior results.

## 2021-10-07 NOTE — ED PROVIDER NOTES
[address] ED  EMERGENCY DEPARTMENT ENCOUNTER        Pt Name: Mandeep Rosado  MRN: [id number]  [place] 1940  Date of evaluation: 10/7/2021  Provider: Chel Pino PA-C  PCP: Sanjeev Sampson MD    Shared Visit or Autonomous Visit:  I have seen and evaluated this patient with my supervising physician Jaleesa Carranza, [facility]       Chief Complaint   Patient presents with    Leg Pain     cut on car door trying to get to ED today. wrapped in towel. +eliquis. tetanus UTD. 89-90% o2 sat in triage. wears o2 w cpap at bedtime. denies o2 use during the day. baseline o2 sat 92%. denies SOB    Diarrhea      sts outpt stool sample today +c diff. PCP sent to ED for treatment. s/s x 1 week. c/o abd pain. no N/V. decreased appetite, decreased urine output. HISTORY OF PRESENT ILLNESS   (Location/Symptom, Timing/Onset, Context/Setting, Quality, Duration, Modifying Factors, Severity)  Note limiting factors. Mandeep Rosado is a 80 y.o. female brought in by  for evaluation and having diarrhea for the past 1 week not eating or drinking only taken a few sips of water at a time. Dropped off outpatient stool sample to primary care and tested positive for C. difficile today. General fatigue and weakness. Complains of abdominal discomfort. O2 sat was 89% on room air on arrival does not typically wear oxygen during the day she wears 2 L at night with her CPAP. Has history of congestive heart failure history of atrial fibrillation, chronic kidney disease. Bilateral leg swelling chronic unchanged. Had labs drawn a couple days ago showing worsening kidney function doctor told her she was dehydrated. Also on the way here her left lower leg was cut on the sharp edge of the door and has a laceration to her left shin. Tetanus up-to-date. The history is provided by the patient.    Diarrhea  Onset quality:  Gradual  Duration:  1 week  Progression: Unchanged  Associated symptoms: abdominal pain    Associated symptoms: no recent cough, no fever and no vomiting    Abdominal pain:     Location:  Generalized    Quality: aching      Chronicity:  New  Fatigue  Onset quality:  Gradual  Duration:  1 week  Progression:  Worsening  Chronicity:  New  Associated symptoms: abdominal pain and diarrhea    Associated symptoms: no chest pain, no cough, no numbness in extremities, no fever, no shortness of breath and no vomiting    Laceration  Location:  Leg  Leg laceration location:  L lower leg  Length:  5cm  Depth: Through dermis  Quality: straight    Bleeding: controlled    Foreign body present:  No foreign bodies  Tetanus status:  Up to date  Associated symptoms: no fever, no numbness and no redness        Nursing Notes were reviewed    REVIEW OF SYSTEMS    (2-9 systems for level 4, 10 or more for level 5)     Review of Systems   Constitutional: Positive for appetite change and fatigue. Negative for fever. Respiratory: Negative for cough and shortness of breath. Cardiovascular: Positive for leg swelling. Negative for chest pain. Gastrointestinal: Positive for abdominal pain and diarrhea. Negative for vomiting. All other systems reviewed and are negative. Positives and Pertinent negatives as per HPI.        PAST MEDICAL HISTORY     Past Medical History:   Diagnosis Date    Anemia, unspecified 2010    neg bone marrow    Asthma     Atrial fibrillation ([address]     Baker's cyst     Basal cell carcinoma of left cheek 3/12/2019    Blind left eye     Carotid artery stenoses     Carotid stenosis 2002    left    Cervical spinal cord compression ([address] 11/2010    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-sided sciatica 8/9/2016    CKD (chronic kidney disease) stage 3, GFR 30-59 ml/min ([facility])     Community acquired pneumonia of left lower lobe of lung 9/8/2018    CRI     Detached retina, left     Diverticulosis     DJD (degenerative joint disease)     Edema chronic    Fatty liver     GERD (gastroesophageal reflux disease)     Herniated lumbar intervertebral disc     Hx stage 2 sacral decubitus ulcer 4/13/2018    Hyperlipidemia     Hypertension     Hypothyroid 1/27/2015    Morbid obesity with BMI of 40.0-44.9, adult ([facility] 2/10/2017    BETSY treated with BiPAP     Parkinson's disease ([facility]     Partial symptomatic epilepsy with complex partial seizures, not intractable, without status epilepticus ([facility] 1/24/2020    Restless leg syndrome     Restless legs syndrome     Rheumatic fever 1950    2x IN [place]    Sleep apnea     Tremor, essential 8/16/2010    Type 2 diabetes mellitus without complication ([facility]     Type II or unspecified type diabetes mellitus without mention of complication, not stated as uncontrolled          SURGICAL HISTORY     Past Surgical History:   Procedure Laterality Date    APPENDECTOMY      CAROTID ENDARTERECTOMY      left    CATARACT REMOVAL      CERVICAL FUSION  11/2010    CHOLECYSTECTOMY      COLONOSCOPY N/A 3/8/2020    COLONOSCOPY FLEXIBLE W/ DECOMPRESSION performed by [facility]      JOINT REPLACEMENT Bilateral 2003    KNEE SURGERY      replacement x 2    SHOULDER SURGERY      TONSILLECTOMY      TUBAL LIGATION      UPPER GASTROINTESTINAL ENDOSCOPY  03/17/2020    Esophagitis    UPPER GASTROINTESTINAL ENDOSCOPY N/A 3/17/2020    EGD performed by Hari Moore MD at [address]       Previous Medications    ALBUTEROL SULFATE  (90 BASE) MCG/ACT INHALER    INHALE 2 PUFFS INTO THE LUNGS EVERY 6 HOURS AS NEEDED FOR WHEEZING    ALPHA-LIPOIC ACID 300 MG TABS    Take by mouth daily    APIXABAN (ELIQUIS) 2.5 MG TABS TABLET    Take 1 tablet by mouth 2 times daily    ASPIRIN EC 81 MG EC TABLET    Take 1 tablet by mouth daily.  Start taking next week.  Indications: OTC    ATORVASTATIN (LIPITOR) 40 MG TABLET    Take 1.5 tablets by mouth daily    BREO ELLIPTA 100-25 MCG/INH AEPB INHALER    INHALE 1 PUFF EVERY DAY    CARBIDOPA-LEVODOPA (SINEMET)  MG PER TABLET    Take 1 tablet by mouth 4 times daily    DULOXETINE (CYMBALTA) 60 MG EXTENDED RELEASE CAPSULE    TAKE 1 CAPSULE EVERY DAY    FERROUS SULFATE (IRON 325) 325 (65 FE) MG TABLET    Take 325 mg by mouth 2 times daily     FLUTICASONE (FLONASE) 50 MCG/ACT NASAL SPRAY    USE 2 PUFFS IN EACH NOSTRIL DAILY    FUROSEMIDE (LASIX) 40 MG TABLET    Take 0.5 tablets by mouth daily    HANDICAP PLACARD MISC    by Does not apply route DX: G20  parkinson's disease  Exp: 9/1/2024    IPRATROPIUM-ALBUTEROL (DUONEB) 0.5-2.5 (3) MG/3ML SOLN NEBULIZER SOLUTION    Inhale 3 mLs into the lungs every 6 hours as needed for Shortness of Breath DX: J45.40    LAMOTRIGINE (LAMICTAL) 25 MG TABLET    Take 25 mg by mouth 2 times daily 2 tabs BID    METOPROLOL TARTRATE (LOPRESSOR) 50 MG TABLET    TAKE 1 TABLET TWICE DAILY    MONTELUKAST (SINGULAIR) 10 MG TABLET    TAKE 1 TABLET EVERY DAY    MULTIPLE VITAMINS-MINERALS (THERAPEUTIC MULTIVITAMIN-MINERALS) TABLET    Take 1 tablet by mouth daily    NIFEDIPINE (ADALAT CC) 60 MG EXTENDED RELEASE TABLET    Take 1 tablet by mouth daily    PROPAFENONE (RYTHMOL) 150 MG TABLET    Take 1 tablet by mouth every 8 hours    VALSARTAN (DIOVAN) 160 MG TABLET    Take 160 mg by mouth daily    VANCOMYCIN (VANCOCIN) 125 MG CAPSULE    Take 1 capsule by mouth 4 times daily for 10 days    VANCOMYCIN (VANCOCIN) 125 MG CAPSULE    Take 1 capsule by mouth 4 times daily for 10 days         ALLERGIES     Levaquin [levofloxacin], Metoclopramide, Phenazopyridine, Pyridium [phenazopyridine hcl], Reglan [metoclopramide hcl], and Reglan [metoclopramide hcl]    FAMILYHISTORY       Family History   Problem Relation Age of Onset    Diabetes Mother     Heart Disease Mother     Diabetes Father     Heart Disease Father  Diabetes Sister           SOCIAL HISTORY       Social History     Socioeconomic History    Marital status:      Spouse name: Anny    Number of children: 2    Years of education: 15    Highest education level: None   Occupational History    None   Tobacco Use    Smoking status: Never Smoker    Smokeless tobacco: Never Used   Vaping Use    Vaping Use: Never used   Substance and Sexual Activity    Alcohol use: No     Alcohol/week: 0.0 standard drinks    Drug use: No    Sexual activity: Not Currently   Other Topics Concern    None   Social History Narrative    ** Merged History Encounter **          Social Determinants of Health     Financial Resource Strain: Low Risk     Difficulty of Paying Living Expenses: Not very hard   Food Insecurity: No Food Insecurity    Worried About Running Out of Food in the Last Year: Never true    Deja of Food in the Last Year: Never true   Transportation Needs: No Transportation Needs    Lack of Transportation (Medical): No    Lack of Transportation (Non-Medical): No   Physical Activity: Inactive    Days of Exercise per Week: 0 days    Minutes of Exercise per Session: 0 min   Stress: No Stress Concern Present    Feeling of Stress : Only a little   Social Connections:  Moderately Isolated    Frequency of Communication with Friends and Family: More than three times a week    Frequency of Social Gatherings with Friends and Family: Once a week    Attends Worship Services: Never    Active Member of Clubs or Organizations: No    Attends Club or Organization Meetings: Never    Marital Status:    Intimate Partner Violence:     Fear of Current or Ex-Partner:     Emotionally Abused:     Physically Abused:     Sexually Abused:        SCREENINGS             PHYSICAL EXAM    (up to 7 for level 4, 8 or more for level 5)     ED Triage Vitals [10/07/21 1445]   BP Temp Temp src Pulse Resp SpO2 Height Weight   116/63 98.1 °F (36.7 °C) -- 73 16 (!) 89 % -- --       Physical Exam  Vitals and nursing note reviewed. Constitutional:       Appearance: She is well-developed. She is obese. She is not toxic-appearing. Interventions: Nasal cannula in place. Comments: Chronic ill appearing   HENT:      Head: Normocephalic and atraumatic. Mouth/Throat:      Mouth: Mucous membranes are moist.      Pharynx: Oropharynx is clear. No oropharyngeal exudate or posterior oropharyngeal erythema. Eyes:      Comments: Left eye blindness   Neck:      Vascular: No JVD. Cardiovascular:      Rate and Rhythm: Normal rate and regular rhythm. Pulmonary:      Effort: Pulmonary effort is normal. No respiratory distress. Breath sounds: No stridor. Rales (bibasilar ) present. No wheezing or rhonchi. Abdominal:      General: Bowel sounds are normal. There is no distension. Palpations: Abdomen is soft. Abdomen is not rigid. There is no mass. Tenderness: There is generalized abdominal tenderness (mild). There is no guarding or rebound. Musculoskeletal:         General: Normal range of motion. Cervical back: Normal range of motion and neck supple. Right lower leg: Edema present. Left lower leg: Edema present. Skin:     General: Skin is warm and dry. Findings: Laceration present. No rash. Comments: 5 cm transverse laceration left mid shin, scant active bleeding controlled, intact sensation, distal pulses 2+. Bruise at site. No bony tenderness. Neurological:      Mental Status: She is alert and oriented to person, place, and time. GCS: GCS eye subscore is 4. GCS verbal subscore is 5. GCS motor subscore is 6. Cranial Nerves: No cranial nerve deficit. Sensory: No sensory deficit. Motor: No abnormal muscle tone.       Coordination: Coordination normal.   Psychiatric:         Behavior: Behavior normal.         DIAGNOSTIC RESULTS   LABS:    Labs Reviewed   CBC WITH AUTO DIFFERENTIAL - Abnormal; Notable for the following components:       Result Value    WBC 12.8 (*)     RBC 3.29 (*)     Hemoglobin 9.1 (*)     Hematocrit 29.4 (*)     MCHC 30.8 (*)     Neutrophils Absolute 10.5 (*)     All other components within normal limits    Narrative:     Performed at:  [facility],  [employer]   Phone [phone]   COMPREHENSIVE METABOLIC PANEL W/ REFLEX TO MG FOR LOW K - Abnormal; Notable for the following components:    Sodium 135 (*)     CO2 20 (*)     BUN 30 (*)     CREATININE 2.0 (*)     GFR Non- 24 (*)     GFR  29 (*)     ALT <5 (*)     AST 12 (*)     All other components within normal limits    Narrative:     Performed at:  [facility],  [employer]   Phone [phone]   BRAIN NATRIURETIC PEPTIDE - Abnormal; Notable for the following components:    Pro-BNP 1,354 (*)     All other components within normal limits    Narrative:     Performed at:  [facility],  [employer], [employer]   Phone [phone], RAPID    Narrative:     Performed at:  [place] [address],  [employer], [place]   Phone [phone]   CULTURE, URINE   LACTATE, SEPSIS    Narrative:     Performed at:  [facility],  [place], [employer]   Phone [phone]   LIPASE    Narrative:     Performed at:  [facility]) [facility],  [place], [employer]   Phone [phone]   TROPONIN    Narrative:     Performed at:  [facility]) [facility],  [place], [employer]   Phone [phone]   LACTATE, SEPSIS   URINE RT REFLEX TO CULTURE   URINALYSIS   BASIC METABOLIC PANEL W/ REFLEX TO MG FOR LOW K   CBC WITH AUTO DIFFERENTIAL     Results for orders placed or performed during the hospital encounter of 10/07/21   COVID-19, Rapid    Specimen: Nasopharyngeal Swab   Result Value Ref Range    SARS-CoV-2, NAAT Not Detected Not Detected   Lactate, Sepsis   Result Value Ref Range    Lactic Acid, Sepsis 0.5 0.4 - 1.9 mmol/L   CBC Auto Differential   Result Value Ref Range    WBC 12.8 (H) 4.0 - 11.0 K/uL    RBC 3.29 (L) 4.00 - 5.20 M/uL    Hemoglobin 9.1 (L) 12.0 - 16.0 g/dL    Hematocrit 29.4 (L) 36.0 - 48.0 %    MCV 89.3 80.0 - 100.0 fL    MCH 27.5 26.0 - 34.0 pg    MCHC 30.8 (L) 31.0 - 36.0 g/dL    RDW 14.4 12.4 - 15.4 %    Platelets 794 926 - 565 K/uL    MPV 7.9 5.0 - 10.5 fL    Neutrophils % 82.2 %    Lymphocytes % 9.2 %    Monocytes % 6.5 %    Eosinophils % 1.9 %    Basophils % 0.2 %    Neutrophils Absolute 10.5 (H) 1.7 - 7.7 K/uL    Lymphocytes Absolute 1.2 1.0 - 5.1 K/uL    Monocytes Absolute 0.8 0.0 - 1.3 K/uL    Eosinophils Absolute 0.2 0.0 - 0.6 K/uL    Basophils Absolute 0.0 0.0 - 0.2 K/uL   Comprehensive Metabolic Panel w/ Reflex to MG   Result Value Ref Range    Sodium 135 (L) 136 - 145 mmol/L    Potassium reflex Magnesium 3.7 3.5 - 5.1 mmol/L    Chloride 99 99 - 110 mmol/L    CO2 20 (L) 21 - 32 mmol/L    Anion Gap 16 3 - 16    Glucose 94 70 - 99 mg/dL    BUN 30 (H) 7 - 20 mg/dL    CREATININE 2.0 (H) 0.6 - 1.2 mg/dL    GFR Non-African American 24 (A) >60    GFR  29 (A) >60    Calcium 8.8 8.3 - 10.6 mg/dL    Total Protein 6.8 6.4 - 8.2 g/dL    Albumin 3.5 3.4 - 5.0 g/dL    Albumin/Globulin Ratio 1.1 1.1 - 2.2    Total Bilirubin <0.2 0.0 - 1.0 mg/dL    Alkaline Phosphatase 111 40 - 129 U/L    ALT <5 (L) 10 - 40 U/L    AST 12 (L) 15 - 37 U/L    Globulin 3.3 g/dL   Lipase   Result Value Ref Range    Lipase 17.0 13.0 - 60.0 U/L   Troponin   Result Value Ref Range    Troponin <0.01 <0.01 ng/mL   Brain Natriuretic Peptide   Result Value Ref Range    Pro-BNP 1,354 (H) 0 - 449 pg/mL   EKG 12 Lead   Result Value Ref Range    Ventricular Rate 70 BPM    Atrial Rate 70 BPM    P-R Interval 168 ms    QRS Duration 118 ms    Q-T Interval 420 ms    QTc Calculation (Bazett) 453 ms    P Axis 52 degrees    R Axis 50 degrees    T Axis 71 degrees    Diagnosis       Normal sinus rhythmNon-specific intra-ventricular conduction delayBorderline ECGWhen compared with ECG of 27-SEP-2021 04:21,No significant change was foundConfirmed by Yolie Red MD, [address] ([facility]) on 10/7/2021 8:11:42 PM         All other labs were within normal range or not returned as of this dictation. EKG: All EKG's are interpreted by the Emergency Department Physician in the absence of a cardiologist.  Please see their note for interpretation of EKG. RADIOLOGY:   Non-plain film images such as CT, Ultrasound and MRI are read by the radiologist. Plain radiographic images are visualized andpreliminarily interpreted by the  ED Provider with the below findings:        Interpretation perthe Radiologist below, if available at the time of this note:    CT ABDOMEN PELVIS WO CONTRAST Additional Contrast? None   Final Result   Mural thickening of the distal transverse to sigmoid colon and diverticulosis   of the descending and sigmoid colon. There is mild pericolonic edema, which   is      more focal at the descending/sigmoid junction. The pattern is most   suggestive of infectious/inflammatory colitis. Focal component of acute   diverticulitis is still considered, the junction of the descending and   sigmoid colon. No macro perforation or abscess. Hepatic steatosis. Pulmonary nodules measuring up to      11 mm. Follow-up CT chest is recommended in 3-6 months. RECOMMENDATIONS:   Fleischner Society guidelines for follow-up and management of incidentally   detected pulmonary nodules:      Multiple Solid Nodules:      Nodule size greater than 8 mm   In a low-risk patient, CT at 3-6 months, then consider CT at 18-24 months. In a high-risk patient, CT at 3-6 months, then CT at 18-24 months.       - Low risk patients include individuals with minimal or absent history of   smoking and other known risk factors. - High risk patients include individuals with a history or smoking or known   risk factors. Radiology 2017 http://pubs. rsna.org/doi/full/10.1148/radiol. 3972341788         XR CHEST PORTABLE   Final Result   No acute cardiopulmonary process           CT ABDOMEN PELVIS WO CONTRAST Additional Contrast? None    Result Date: 10/7/2021  EXAMINATION: CT OF THE ABDOMEN AND PELVIS WITHOUT CONTRAST 10/7/2021 6:00 pm TECHNIQUE: CT of the abdomen and pelvis was performed without the administration of intravenous contrast. Multiplanar reformatted images are provided for review. Dose modulation, iterative reconstruction, and/or weight based adjustment of the mA/kV was utilized to reduce the radiation dose to as low as reasonably achievable. COMPARISON: 03/04/2020 HISTORY: ORDERING SYSTEM PROVIDED HISTORY: abdominal pain TECHNOLOGIST PROVIDED HISTORY: Reason for exam:->abdominal pain Additional Contrast?->None Decision Support Exception - unselect if not a suspected or confirmed emergency medical condition->Emergency Medical Condition (MA) Reason for Exam: diarrhea and leg cramps Relevant Medical/Surgical History: hysterectomy FINDINGS: Lower Chest: Two 4 mm nodules are noted in the right lung base, middle and lower lobe respectively. There is a 12 x 10 mm nodule versus band of atelectasis in the left lower lobe. Organs: There is mild diffuse fatty infiltration of the liver. Cholecystectomy clips are present. The pancreas, spleen and adrenal glands are unremarkable. The kidneys are symmetrical in size. There is no hydronephrosis. GI/Bowel: The stomach and small bowel are unremarkable. There are multiple appendicoliths adjacent to the cecum. No dilation of the appendix is identified. There is mural thickening of the colon from the distal transverse to sigmoid colon. There is also diverticulosis of the descending and sigmoid colon.  There is mild pericolonic edema, which is greater at the junction of the descending and sigmoid colon with possible inflamed diverticulum. Pelvis: Urinary bladder is unremarkable. Uterus is absent. Peritoneum/Retroperitoneum: There is severe aortoiliac calcification, without aneurysm. No adenopathy. No pneumoperitoneum. No free fluid. Bones/Soft Tissues: No acute osseous injury. Abdominal wall is unremarkable. Mural thickening of the distal transverse to sigmoid colon and diverticulosis of the descending and sigmoid colon. There is mild pericolonic edema, which is more focal at the descending/sigmoid junction. The pattern is most suggestive of infectious/inflammatory colitis. Focal component of acute diverticulitis is still considered, the junction of the descending and sigmoid colon. No macro perforation or abscess. Hepatic steatosis. Pulmonary nodules measuring up to 11 mm. Follow-up CT chest is recommended in 3-6 months. RECOMMENDATIONS: Fleischner Society guidelines for follow-up and management of incidentally detected pulmonary nodules: Multiple Solid Nodules: Nodule size greater than 8 mm In a low-risk patient, CT at 3-6 months, then consider CT at 18-24 months. In a high-risk patient, CT at 3-6 months, then CT at 18-24 months. - Low risk patients include individuals with minimal or absent history of smoking and other known risk factors. - High risk patients include individuals with a history or smoking or known risk factors. Radiology 2017 http://pubs. rsna.org/doi/full/10.1148/radiol. 3850877655     XR CHEST PORTABLE    Result Date: 10/7/2021  EXAMINATION: ONE XRAY VIEW OF THE CHEST 10/7/2021 5:39 pm COMPARISON: 09/27/2021 HISTORY: ORDERING SYSTEM PROVIDED HISTORY: sob TECHNOLOGIST PROVIDED HISTORY: Reason for exam:->sob Reason for Exam: c/o feeling SOB Acuity: Acute Type of Exam: Initial FINDINGS: The cardiomediastinal silhouette is mildly enlarged in size and contour. Minor perihilar congestion.   The lungs Patient was given thefollowing medications:  Medications   apixaban (ELIQUIS) tablet 2.5 mg (2.5 mg Oral Given 10/7/21 2207)   atorvastatin (LIPITOR) tablet 40 mg (40 mg Oral Given 10/7/21 2207)   carbidopa-levodopa (SINEMET)  MG per tablet 1 tablet (1 tablet Oral Given 10/7/21 2207)   DULoxetine (CYMBALTA) extended release capsule 60 mg (has no administration in time range)   lamoTRIgine (LAMICTAL) tablet 25 mg (25 mg Oral Given 10/7/21 2207)   metoprolol tartrate (LOPRESSOR) tablet 25 mg (25 mg Oral Given 10/7/21 2207)   montelukast (SINGULAIR) tablet 10 mg (10 mg Oral Given 10/7/21 2207)   propafenone (RYTHMOL) tablet 150 mg (150 mg Oral Given 10/7/21 2206)   sodium chloride flush 0.9 % injection 5-40 mL (has no administration in time range)   sodium chloride flush 0.9 % injection 5-40 mL (has no administration in time range)   0.9 % sodium chloride infusion (has no administration in time range)   ondansetron (ZOFRAN-ODT) disintegrating tablet 4 mg (has no administration in time range)     Or   ondansetron (ZOFRAN) injection 4 mg (has no administration in time range)   polyethylene glycol (GLYCOLAX) packet 17 g (has no administration in time range)   acetaminophen (TYLENOL) tablet 650 mg (has no administration in time range)     Or   acetaminophen (TYLENOL) suppository 650 mg (has no administration in time range)   dextrose 5 % and 0.45 % NaCl with KCl 20 mEq infusion (has no administration in time range)   metronidazole (FLAGYL) 500 mg in NaCl 100 mL IVPB premix (500 mg IntraVENous New Bag 10/7/21 2214)   vancomycin (VANCOCIN) oral solution 125 mg (has no administration in time range)   cholestyramine (QUESTRAN) packet 4 g (4 g Oral Given 10/7/21 2206)   lactobacillus (CULTURELLE) capsule 1 capsule (has no administration in time range)   metronidazole (FLAGYL) 500 mg in NaCl 100 mL IVPB premix (0 mg IntraVENous Stopped 10/7/21 2038)   0.9 % sodium chloride bolus (0 mLs IntraVENous Stopped 10/7/21 2038)   vancomycin (VANCOCIN) oral solution 250 mg (250 mg Oral Given 10/7/21 2043)         ED course  Patient presented to the ER for evaluation of diarrhea general fatigue and weakness tested positive for C. difficile notified today while on his way bringing her here for evaluation she also cut her left leg on the edge of a door and has laceration. Bleeding controlled. Neurovascularly intact. Wound was cleaned and repaired with sutures. Suture removal in 10 days. Complains of generalized abdominal discomfort CT scan abdomen pelvis was obtained showing evidence of colitis. She was given IV Flagyl and p.o. vancomycin and IV fluids for hydration. Has acute on chronic kidney disease creatinine 2.0. O2 sat was 89 to 90% on room air on arrival states she uses 2 L oxygen with her CPAP at night. Chest x-ray lungs are clear no acute cardiopulmonary disease. No CHF. Patient is stable. FINAL IMPRESSION      1. C. difficile colitis    2. Acute kidney injury superimposed on chronic kidney disease (HCC)    3. Dehydration    4. Acute on chronic respiratory failure with hypoxia (HCC)    5. Laceration of left lower extremity, initial encounter    6. Pulmonary nodules          DISPOSITION/PLAN   DISPOSITION     PATIENT REFERREDTO:  No follow-up provider specified.     DISCHARGE MEDICATIONS:  New Prescriptions    No medications on file       DISCONTINUED MEDICATIONS:  Discontinued Medications    No medications on file              (Please note that portions ofthis note were completed with a voice recognition program.  Efforts were made to edit the dictations but occasionally words are mis-transcribed.)    Geovanni Ruiz PA-C (electronically signed)            Octavia Alaniz PA-C  10/07/21 8301

## 2021-10-08 ENCOUNTER — APPOINTMENT (OUTPATIENT)
Dept: INTERVENTIONAL RADIOLOGY/VASCULAR | Age: 81
DRG: 372 | End: 2021-10-08
Payer: MEDICARE

## 2021-10-08 DIAGNOSIS — R91.1 INCIDENTAL LUNG NODULE, GREATER THAN OR EQUAL TO 8MM: Primary | ICD-10-CM

## 2021-10-08 LAB
ANION GAP SERPL CALCULATED.3IONS-SCNC: 11 MMOL/L (ref 3–16)
BASOPHILS ABSOLUTE: 0 K/UL (ref 0–0.2)
BASOPHILS RELATIVE PERCENT: 0.3 %
BUN BLDV-MCNC: 24 MG/DL (ref 7–20)
CALCIUM SERPL-MCNC: 8.1 MG/DL (ref 8.3–10.6)
CHLORIDE BLD-SCNC: 106 MMOL/L (ref 99–110)
CO2: 20 MMOL/L (ref 21–32)
CREAT SERPL-MCNC: 1.6 MG/DL (ref 0.6–1.2)
EOSINOPHILS ABSOLUTE: 0.2 K/UL (ref 0–0.6)
EOSINOPHILS RELATIVE PERCENT: 2.5 %
GFR AFRICAN AMERICAN: 38
GFR NON-AFRICAN AMERICAN: 31
GI BACTERIAL PATHOGENS BY PCR: NORMAL
GLUCOSE BLD-MCNC: 146 MG/DL (ref 70–99)
HCT VFR BLD CALC: 27 % (ref 36–48)
HEMOGLOBIN: 8.5 G/DL (ref 12–16)
LACTIC ACID, SEPSIS: 0.5 MMOL/L (ref 0.4–1.9)
LYMPHOCYTES ABSOLUTE: 0.5 K/UL (ref 1–5.1)
LYMPHOCYTES RELATIVE PERCENT: 5.2 %
MAGNESIUM: 2 MG/DL (ref 1.8–2.4)
MCH RBC QN AUTO: 27.8 PG (ref 26–34)
MCHC RBC AUTO-ENTMCNC: 31.5 G/DL (ref 31–36)
MCV RBC AUTO: 88.1 FL (ref 80–100)
MONOCYTES ABSOLUTE: 0.4 K/UL (ref 0–1.3)
MONOCYTES RELATIVE PERCENT: 4.3 %
NEUTROPHILS ABSOLUTE: 8.2 K/UL (ref 1.7–7.7)
NEUTROPHILS RELATIVE PERCENT: 87.7 %
PDW BLD-RTO: 14.4 % (ref 12.4–15.4)
PLATELET # BLD: 297 K/UL (ref 135–450)
PMV BLD AUTO: 7.6 FL (ref 5–10.5)
POTASSIUM REFLEX MAGNESIUM: 3.4 MMOL/L (ref 3.5–5.1)
RBC # BLD: 3.06 M/UL (ref 4–5.2)
SODIUM BLD-SCNC: 137 MMOL/L (ref 136–145)
WBC # BLD: 9.3 K/UL (ref 4–11)

## 2021-10-08 PROCEDURE — 2500000003 HC RX 250 WO HCPCS: Performed by: INTERNAL MEDICINE

## 2021-10-08 PROCEDURE — 36415 COLL VENOUS BLD VENIPUNCTURE: CPT

## 2021-10-08 PROCEDURE — 80048 BASIC METABOLIC PNL TOTAL CA: CPT

## 2021-10-08 PROCEDURE — 83735 ASSAY OF MAGNESIUM: CPT

## 2021-10-08 PROCEDURE — 6370000000 HC RX 637 (ALT 250 FOR IP): Performed by: INTERNAL MEDICINE

## 2021-10-08 PROCEDURE — 85025 COMPLETE CBC W/AUTO DIFF WBC: CPT

## 2021-10-08 PROCEDURE — 05H933Z INSERTION OF INFUSION DEVICE INTO RIGHT BRACHIAL VEIN, PERCUTANEOUS APPROACH: ICD-10-PCS | Performed by: RADIOLOGY

## 2021-10-08 PROCEDURE — 76937 US GUIDE VASCULAR ACCESS: CPT

## 2021-10-08 PROCEDURE — 2580000003 HC RX 258: Performed by: INTERNAL MEDICINE

## 2021-10-08 PROCEDURE — 36410 VNPNXR 3YR/> PHY/QHP DX/THER: CPT

## 2021-10-08 PROCEDURE — 1200000000 HC SEMI PRIVATE

## 2021-10-08 PROCEDURE — C1751 CATH, INF, PER/CENT/MIDLINE: HCPCS

## 2021-10-08 PROCEDURE — 83605 ASSAY OF LACTIC ACID: CPT

## 2021-10-08 PROCEDURE — 99233 SBSQ HOSP IP/OBS HIGH 50: CPT | Performed by: INTERNAL MEDICINE

## 2021-10-08 RX ADMIN — APIXABAN 2.5 MG: 5 TABLET, FILM COATED ORAL at 20:58

## 2021-10-08 RX ADMIN — CHOLESTYRAMINE 4 G: 4 POWDER, FOR SUSPENSION ORAL at 20:59

## 2021-10-08 RX ADMIN — Medication 125 MG: at 01:30

## 2021-10-08 RX ADMIN — DULOXETINE HYDROCHLORIDE 60 MG: 60 CAPSULE, DELAYED RELEASE ORAL at 09:09

## 2021-10-08 RX ADMIN — Medication 250 MG: at 18:21

## 2021-10-08 RX ADMIN — Medication 250 MG: at 12:22

## 2021-10-08 RX ADMIN — METRONIDAZOLE 500 MG: 500 INJECTION, SOLUTION INTRAVENOUS at 09:13

## 2021-10-08 RX ADMIN — PROPAFENONE HYDROCHLORIDE 150 MG: 150 TABLET, FILM COATED ORAL at 21:00

## 2021-10-08 RX ADMIN — LAMOTRIGINE 25 MG: 25 TABLET ORAL at 20:58

## 2021-10-08 RX ADMIN — CARBIDOPA AND LEVODOPA 1 TABLET: 25; 100 TABLET ORAL at 12:23

## 2021-10-08 RX ADMIN — ATORVASTATIN CALCIUM 40 MG: 40 TABLET, FILM COATED ORAL at 20:58

## 2021-10-08 RX ADMIN — METRONIDAZOLE 500 MG: 500 INJECTION, SOLUTION INTRAVENOUS at 18:20

## 2021-10-08 RX ADMIN — APIXABAN 2.5 MG: 5 TABLET, FILM COATED ORAL at 09:09

## 2021-10-08 RX ADMIN — METOPROLOL TARTRATE 25 MG: 25 TABLET, FILM COATED ORAL at 09:09

## 2021-10-08 RX ADMIN — SODIUM CHLORIDE, PRESERVATIVE FREE 10 ML: 5 INJECTION INTRAVENOUS at 20:58

## 2021-10-08 RX ADMIN — Medication 1 CAPSULE: at 09:09

## 2021-10-08 RX ADMIN — CARBIDOPA AND LEVODOPA 1 TABLET: 25; 100 TABLET ORAL at 20:59

## 2021-10-08 RX ADMIN — POTASSIUM CHLORIDE, DEXTROSE MONOHYDRATE AND SODIUM CHLORIDE: 150; 5; 450 INJECTION, SOLUTION INTRAVENOUS at 04:31

## 2021-10-08 RX ADMIN — POTASSIUM CHLORIDE, DEXTROSE MONOHYDRATE AND SODIUM CHLORIDE: 150; 5; 450 INJECTION, SOLUTION INTRAVENOUS at 01:29

## 2021-10-08 RX ADMIN — CARBIDOPA AND LEVODOPA 1 TABLET: 25; 100 TABLET ORAL at 09:09

## 2021-10-08 RX ADMIN — MONTELUKAST SODIUM 10 MG: 10 TABLET, COATED ORAL at 20:58

## 2021-10-08 RX ADMIN — METOPROLOL TARTRATE 25 MG: 25 TABLET, FILM COATED ORAL at 20:58

## 2021-10-08 RX ADMIN — CARBIDOPA AND LEVODOPA 1 TABLET: 25; 100 TABLET ORAL at 18:37

## 2021-10-08 RX ADMIN — LAMOTRIGINE 25 MG: 25 TABLET ORAL at 09:09

## 2021-10-08 RX ADMIN — PROPAFENONE HYDROCHLORIDE 150 MG: 150 TABLET, FILM COATED ORAL at 14:38

## 2021-10-08 RX ADMIN — CHOLESTYRAMINE 4 G: 4 POWDER, FOR SUSPENSION ORAL at 09:01

## 2021-10-08 NOTE — CARE COORDINATION
Case Management Assessment  Initial Evaluation      Patient Name: Monica Cowart  YOB: 1940  Diagnosis: Dehydration [E86.0]  Clostridium difficile colitis [A04.72]  Pulmonary nodules [R91.8]  C. difficile colitis [A04.72]  Laceration of left lower extremity, initial encounter [I84.433R]  Acute on chronic respiratory failure with hypoxia ([facility] [address] [J96.21]  Acute kidney injury superimposed on chronic kidney disease ([facility] [address] [N17.9, N18.9]  Date / Time: 10/7/2021  4:13 PM    Admission status/Date: IP 10/07/2021  Chart Reviewed: Yes      Patient Interviewed: Yes   Family Interviewed:  No      Hospitalization in the last 30 days:  No      Health Care Decision Maker :   Primary Decision Maker:  Derek Jaison - Spouse - [phone]    (CM - must 1st enter selection under Navigator - emergency contact- Health Care Decision Maker Relationship and pick relationship)   Who do you trust or have selected to make healthcare decisions for you      Met with: patient's spouse  Interview conducted  (bedside/phone): Pt off floor to testing    Current PCP: Conchita Prieto MD    Financial  Medicare/[facility]  Precert required for SNF : NA         3 night stay required - NA    ADLS  Support Systems/Care Needs:    Transportation: family    Meal Preparation: fam    Housing  Living Arrangements:house w/sp (pt has Parkinsons)  Steps: WC ramp  Intent for return to present living arrangements: Yes  Identified Issues: NA    Home Care Information  Active with [address] : Yes - [facility] (following)     Passport/Waiver : No  :                      Phone Number:    Passport/Waiver Services: NA          Durable Medical Equiptment   DME Provider: [facility]  Equipment:   Walker_X__Cane___RTS___ BSC___Shower Chair___Hospital Bed___W/C_X___Other________  02 at ____Liter(s)---wears(frequency)_______ HHN ___ CPAP___ BiPap___   N/A____      Home O2 Use :    NO 92% RA    Community Service Affiliation  Dialysis:  No · Agency:  · Location:  · Dialysis Schedule:  · Phone:   · Fax: Other Community Services: (ex:PT/OT,Mental Health,Wound Clinic, Cardio/Pul [address])    DISCHARGE PLAN: Explained Case Management role/services. Chart reviewed. Met with pt's spouse at bedside (pt off floor to testing) and explained the role of the CM. Plan: TBD. Home w/ resumption of HHC vs SNF. Needs PT/OT DC recs when appropriate.  +CM follwoing

## 2021-10-08 NOTE — H&P
Hospital Medicine History & Physical      PCP: Anette Lee MD    Date of Admission: 10/7/2021    Date of Service: Pt seen/examined on 10/7/2021    Chief Complaint: Diarrhea    History Of Present Illness:    80 y.o. female who presented to the hospital with a chief complaint of diarrhea. The patient has been having abdominal cramping and diarrheal symptoms for a week. Was recently treated for UTI late last month. Actually her PCP started her on p.o. vancomycin but her symptoms continued. Stool samples were obtained and came back positive for C. difficile, she was sent to the emergency department for evaluation. Unfortunately in a way into the ER, she suffered a laceration to her left lower extremity which has since been repaired in emergency department. She will be admitted for C. difficile colitis and abdominal pain. She is vaccinated for the Covid.     Past Medical History:        Diagnosis Date    Anemia, unspecified 2010    neg bone marrow    Asthma     Atrial fibrillation ([address]     Baker's cyst     Basal cell carcinoma of left cheek 3/12/2019    Blind left eye     Carotid artery stenoses     Carotid stenosis 2002    left    Cervical spinal cord compression ([address] 11/2010    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-sided sciatica 8/9/2016    CKD (chronic kidney disease) stage 3, GFR 30-59 ml/min ([facility])     Community acquired pneumonia of left lower lobe of lung 9/8/2018    CRI     Detached retina, left     Diverticulosis     DJD (degenerative joint disease)     Edema     chronic    Fatty liver     GERD (gastroesophageal reflux disease)     Herniated lumbar intervertebral disc     Hx stage 2 sacral decubitus ulcer 4/13/2018    Hyperlipidemia     Hypertension     Hypothyroid 1/27/2015    Morbid obesity with BMI of 40.0-44.9, adult ([address] 2/10/2017    BETSY treated with BiPAP     Parkinson's disease ([address]     Partial symptomatic epilepsy with complex partial seizures, not intractable, without status epilepticus ([facility] [address] 1/24/2020    Restless leg syndrome     Restless legs syndrome     Rheumatic fever 1950    2x IN [place]    Sleep apnea     Tremor, essential 8/16/2010    Type 2 diabetes mellitus without complication (HCC)     Type II or unspecified type diabetes mellitus without mention of complication, not stated as uncontrolled        Past Surgical History:          Procedure Laterality Date    APPENDECTOMY      CAROTID ENDARTERECTOMY      left    CATARACT REMOVAL      CERVICAL FUSION  11/2010    CHOLECYSTECTOMY      COLONOSCOPY N/A 3/8/2020    COLONOSCOPY FLEXIBLE W/ DECOMPRESSION performed by Tesha Magallon MD at [address] [facility] [address] REPLACEMENT Bilateral 2003    KNEE SURGERY      replacement x 2    SHOULDER SURGERY      TONSILLECTOMY      TUBAL LIGATION      UPPER GASTROINTESTINAL ENDOSCOPY  03/17/2020    Esophagitis    UPPER GASTROINTESTINAL ENDOSCOPY N/A 3/17/2020    EGD performed by Teodora Fuller MD at [address] [facility]         Medications Prior to Admission:      Prior to Admission medications    Medication Sig Start Date End Date Taking?  Authorizing Provider   vancomycin (VANCOCIN) 125 MG capsule Take 1 capsule by mouth 4 times daily for 10 days 10/7/21 10/17/21  MAHAD Lafleur CNP   vancomycin (VANCOCIN) 125 MG capsule Take 1 capsule by mouth 4 times daily for 10 days 10/7/21 10/17/21  MAHAD Lafleur CNP   apixaban (ELIQUIS) 2.5 MG TABS tablet Take 1 tablet by mouth 2 times daily 9/13/21   Jeferson Miranda MD   valsartan (DIOVAN) 160 MG tablet Take 160 mg by mouth daily    Historical Provider, MD   metoprolol tartrate (LOPRESSOR) 50 MG tablet TAKE 1 TABLET TWICE DAILY 7/14/21   MAHAD Winn CNP   DULoxetine (CYMBALTA) 60 MG extended release capsule TAKE 1 CAPSULE EVERY DAY 7/14/21   Shannan Ann Yakelin, APRN - CNP   montelukast (SINGULAIR) 10 MG tablet TAKE 1 TABLET EVERY DAY 7/14/21   MAHAD Wilson CNP   albuterol sulfate  (90 Base) MCG/ACT inhaler INHALE 2 PUFFS INTO THE LUNGS EVERY 6 HOURS AS NEEDED FOR WHEEZING 7/14/21   MAHAD Wilson CNP   BREO ELLIPTA 100-25 MCG/INH AEPB inhaler INHALE 1 PUFF EVERY DAY 7/14/21   MAHAD Wilson CNP   ferrous sulfate (IRON 325) 325 (65 Fe) MG tablet Take 325 mg by mouth 2 times daily  2/4/21 5/5/21  Historical Provider, MD   atorvastatin (LIPITOR) 40 MG tablet Take 1.5 tablets by mouth daily 1/14/21   Sergo Cannon MD   NIFEdipine (ADALAT CC) 60 MG extended release tablet Take 1 tablet by mouth daily 1/11/21   Devon Ramires MD   propafenone (RYTHMOL) 150 MG tablet Take 1 tablet by mouth every 8 hours 1/11/21   Devon Ramires MD   furosemide (LASIX) 40 MG tablet Take 0.5 tablets by mouth daily 1/11/21   Devon Ramires MD   fluticasone [facility]) 50 MCG/ACT nasal spray USE 2 PUFFS IN [facility] NOSTRIL DAILY 1/8/21   BIJAN Mcgill   carbidopa-levodopa (SINEMET)  MG per tablet Take 1 tablet by mouth 4 times daily    Historical Provider, MD   Alpha-Lipoic Acid 300 MG TABS Take by mouth daily    Historical Provider, MD   lamoTRIgine (LAMICTAL) 25 MG tablet Take 25 mg by mouth 2 times daily 2 tabs BID    Historical Provider, MD   Handicap Placard MISC by Does not apply route DX: Jammietigre Franklynjohanna  parkinson's disease  Exp: 9/1/2024 9/27/19   BIJAN Mcgill   ipratropium-albuterol (DUONEB) 0.5-2.5 (3) MG/3ML SOLN nebulizer solution Inhale 3 mLs into the lungs every 6 hours as needed for Shortness of Breath DX: J45.40 2/6/19   BIJAN Mcgill   Multiple Vitamins-Minerals (THERAPEUTIC MULTIVITAMIN-MINERALS) tablet Take 1 tablet by mouth daily    Historical Provider, MD   solifenacin (VESICARE) 5 MG tablet Take 1 tablet by mouth daily. 6/25/13 10/29/13  Lucas Max MD   aspirin EC 81 MG EC tablet Take 1 tablet by mouth daily.  Start taking next week. Indications: OTC 2/25/13   Dulce Brock MD       Allergies:  Levaquin [levofloxacin], Metoclopramide, Phenazopyridine, Pyridium [phenazopyridine hcl], Reglan [metoclopramide hcl], and Reglan [metoclopramide hcl]    Social History:      TOBACCO:   reports that she has never smoked. She has never used smokeless tobacco.  ETOH:   reports no history of alcohol use. Family History:          Problem Relation Age of Onset    Diabetes Mother     Heart Disease Mother     Diabetes Father     Heart Disease Father     Diabetes Sister        REVIEW OF SYSTEMS:   Constitutional:  Negative for fever,chills or night sweats  ENT:  Negative for rhinorrhea, epistaxis, hoarseness, sore throat. Respiratory: Negative for SOB or wheezing   Cardiovascular:   Negative for  chest pain, palpitations   Gastrointestinal:   Positive for diarrhea  Genitourinary:  Negative for polyuria, dysuria   Hematologic/Lymphatic:  Negative for  bleeding tendency, easy bruising  Musculoskeletal:  Negative for myalgias and arthralgias  Neurologic:  Negative for  confusion,dysarthria. Skin:  Negative for itching,rash  Psychiatric:  Negative for depression,anxiety, agitation. Endocrine:  Negative for polydipsia,polyuria,heat /cold intolerance. PHYSICAL EXAM:    BP (!) 154/51   Pulse 71   Temp 98.1 °F (36.7 °C)   Resp 16   SpO2 100%     General appearance:  No apparent distress, appears stated age and cooperative. HEENT:  Normal cephalic, atraumatic without obvious deformity. Pupils equal, round, and reactive to light. Extra ocular muscles intact. Conjunctivae/corneas clear. Neck: Supple, with full range of motion. No jugular venous distention. Trachea midline. Respiratory:  Normal respiratory effort. Clear to auscultation, bilaterally without Rales/Wheezes/Rhonchi. Cardiovascular:  Regular rate and rhythm with normal S1/S2 without murmurs, rubs or gallops.   Abdomen: Soft, non-tender, non-distended with normal bowel sounds. Musculoskeletal:  No clubbing, cyanosis or edema bilaterally. Full range of motion without deformity. Skin: Left lower extremity dressing in place  Neurologic:  Neurovascularly intact without any focal sensory/motor deficits. Cranial nerves: II-XII intact, grossly non-focal.  Psychiatric:  Alert and oriented, thought content appropriate, normal insight  Capillary Refill: Brisk,< 3 seconds   Peripheral Pulses: +2 palpable, equal bilaterally       Labs:   Recent Labs     10/06/21  1020 10/07/21  1735   WBC 14.7* 12.8*   HGB 9.7* 9.1*   HCT 31.5* 29.4*    342     Recent Labs     10/06/21  1020 10/07/21  1735    135*   K 4.4 3.7    99   CO2 24 20*   BUN 23* 30*   CREATININE 1.8* 2.0*   CALCIUM 9.1 8.8     Recent Labs     10/07/21  1735   AST 12*   ALT <5*   BILITOT <0.2   ALKPHOS 111     No results for input(s): INR in the last 72 hours. Recent Labs     10/07/21  1735   TROPONINI <0.01       Urinalysis:      Lab Results   Component Value Date    NITRU POSITIVE 09/27/2021    WBCUA >100 09/27/2021    BACTERIA 4+ 09/27/2021    RBCUA 0-2 09/27/2021    BLOODU Negative 09/27/2021    SPECGRAV 1.015 09/27/2021    GLUCOSEU Negative 09/27/2021       Radiology:   CT ABDOMEN PELVIS WO CONTRAST Additional Contrast? None   Final Result   Mural thickening of the distal transverse to sigmoid colon and diverticulosis   of the descending and sigmoid colon. There is mild pericolonic edema, which   is      more focal at the descending/sigmoid junction. The pattern is most   suggestive of infectious/inflammatory colitis. Focal component of acute   diverticulitis is still considered, the junction of the descending and   sigmoid colon. No macro perforation or abscess. Hepatic steatosis. Pulmonary nodules measuring up to      11 mm. Follow-up CT chest is recommended in 3-6 months.       RECOMMENDATIONS:   Fleischner Society guidelines for follow-up and management of incidentally   detected pulmonary nodules:      Multiple Solid Nodules:      Nodule size greater than 8 mm   In a low-risk patient, CT at 3-6 months, then consider CT at 18-24 months. In a high-risk patient, CT at 3-6 months, then CT at 18-24 months. - Low risk patients include individuals with minimal or absent history of   smoking and other known risk factors. - High risk patients include individuals with a history or smoking or known   risk factors. Radiology 2017 http://pubs. rsna.org/doi/full/10.1148/radiol. 0634406823         XR CHEST PORTABLE   Final Result   No acute cardiopulmonary process             ASSESSMENT:  C. difficile colitis  Left lower extremity laceration status post repair  Morbid obesity  Permanent atrial fibrillation on Eliquis  Hypertension  Hypothyroidism  Anemia of chronic disease  CKD stage III  Morbid obesity  BETSY  Parkinson's disease    PLAN:  It is unclear if this is a failure of p.o. vancomycin as an outpatient versus not enough time on completion of therapy. Since she will be admitted to the hospital we will start her on IV Flagyl and p.o. vancomycin. Probiotics ordered and Questran. Gentle IV fluids    -Resume home medications including Eliquis  -BiPAP at nighttime  -Reevaluate in a.m. DVT Prophylaxis: Eliquis  Diet: ADULT DIET; Regular; Low Sodium (2 gm)  Code Status: Full Code    Dispo -admit to [facility]      Thank you for the opportunity to be involved in this patient's care.       (Please note that portions of this note were completed with a voice recognition program. Efforts were made to edit the dictations but occasionally words are mis-transcribed.)

## 2021-10-08 NOTE — ACP (ADVANCE CARE PLANNING)
Advance Care Planning   Healthcare Decision Maker:    Primary Decision Maker: Deshawn Ruth - [facility] - [phone]    Click here to complete Healthcare Decision Makers including selection of the Healthcare Decision Maker Relationship (ie \"Primary\").

## 2021-10-08 NOTE — FLOWSHEET NOTE
10/08/21 0359   Vital Signs   Temp 96.7 °F (35.9 °C)   Temp Source Oral   Pulse 82   Resp 20   BP (!) 157/81   BP Location Left upper arm   Level of Consciousness Alert (0)   MEWS Score 1   Oxygen Therapy   SpO2 100 %   O2 Device Nasal cannula   O2 Flow Rate (L/min) 3 L/min     Pt a/o. Admitted to 2 from ER. Pt oriented to room/ call light. Pt wiped down with chlorhexidine wipes, pt incontinent of stool X2, pt attends changed X2, zinc paste applied to blanchable red coccyx. Bed alarm on for safety, call light within reach. Pt denies needs at this time.  Felix Danielson RN

## 2021-10-08 NOTE — PROGRESS NOTES
IM Progress Note    Admit Date:  10/7/2021  1    Interval history:  c diff colitis , gen weakness     Subjective:  Ms. Elizabeth Alfaro seen up in bed, still with loose stools but not incontinent      Objective:   BP (!) 176/68   Pulse 88   Temp 97.6 °F (36.4 °C) (Oral)   Resp 20   SpO2 94%       Intake/Output Summary (Last 24 hours) at 10/8/2021 2041  Last data filed at 10/8/2021 0052  Gross per 24 hour   Intake 100 ml   Output    Net 100 ml       Physical Exam:      General: obese female, up in bed   Left opaque cornea   Awake, alert and oriented. Appears to be not in any distress  Mucous Membranes:  Pink , anicteric  Neck: No JVD, no carotid bruit, no thyromegaly  Chest:  Clear to auscultation bilaterally, no added sounds  Cardiovascular:  RRR S1S2 heard, no murmurs or gallops  Abdomen:  Soft, obese, left upper quadrant + tenderness  undistended, , no organomegaly, BS present  Extremities: left leg laceration wound healthy with sutures   No edema or cyanosis.  Distal pulses well felt  Neurological : grossly normal        Medications:   Scheduled Medications:    vancomycin  250 mg Oral 4 times per day    metroNIDAZOLE  500 mg IntraVENous Q8H    apixaban  2.5 mg Oral BID    atorvastatin  40 mg Oral Nightly    carbidopa-levodopa  1 tablet Oral 4x Daily    DULoxetine  60 mg Oral Daily    lamoTRIgine  25 mg Oral BID    metoprolol tartrate  25 mg Oral BID    montelukast  10 mg Oral Nightly    propafenone  150 mg Oral 3 times per day    sodium chloride flush  5-40 mL IntraVENous 2 times per day    cholestyramine  1 packet Oral BID    lactobacillus  1 capsule Oral Daily with breakfast     I   sodium chloride      dextrose 5% and 0.45% NaCl with KCl 20 mEq 75 mL/hr at 10/08/21 0431     sodium chloride flush, sodium chloride, ondansetron **OR** ondansetron, polyethylene glycol, acetaminophen **OR** acetaminophen    Lab Data:  Recent Labs     10/06/21  1020 10/07/21  1735   WBC 14.7* 12.8*   HGB 9.7* 9.1*   HCT 31.5* 29.4*   MCV 88.5 89.3    342     Recent Labs     10/06/21  1020 10/07/21  1735    135*   K 4.4 3.7    99   CO2 24 20*   BUN 23* 30*   CREATININE 1.8* 2.0*     Recent Labs     10/07/21  1735   TROPONINI <0.01       Coagulation:   Lab Results   Component Value Date    INR 1.09 04/09/2021    APTT 19.5 04/09/2021     Cardiac markers:   Lab Results   Component Value Date    CKTOTAL 66 09/08/2018    TROPONINI <0.01 10/07/2021         Lab Results   Component Value Date    ALT <5 (L) 10/07/2021    AST 12 (L) 10/07/2021    ALKPHOS 111 10/07/2021    BILITOT <0.2 10/07/2021       Lab Results   Component Value Date    INR 1.09 04/09/2021    INR 1.27 (H) 12/10/2018    INR 1.04 09/08/2018    PROTIME 12.7 04/09/2021    PROTIME 14.4 (H) 12/10/2018    PROTIME 11.9 09/08/2018       Radiology    Ct abd    Mural thickening of the distal transverse to sigmoid colon and diverticulosis   of the descending and sigmoid colon. Cottie Sayres is mild pericolonic edema, which   is     more focal at the descending/sigmoid junction.  The pattern is most   suggestive of infectious/inflammatory colitis.  Focal component of acute   diverticulitis is still considered, the junction of the descending and   sigmoid colon. No macro perforation or abscess. Hepatic steatosis. Pulmonary nodules measuring up to     11 mm.  Follow-up CT chest is recommended in 3-6 months           XR CHEST PORTABLE   Final Result   No acute cardiopulmonary process                 C. difficile colitis  - sec to recent abx use for UTi   - now on vancomycin increase dose to 250 mg q6 hrly. Continue IV flagyl while in house  - start probiotics in am  - contact plus isolation   - disease prognosis, plan of care and precautions discussed with family     Left lower extremity laceration status post repair- laceration occurred in ER while transferring from wheelchair. Sutures in place.  Need removal in 10 days  Wound appears healthy    Permanent atrial fibrillation on Eliquis- continue rythmol, rate controlled with metoprolol . Continue AC with eliquis 2.5 mgbid     Hypertension- stable on BB - holding nifedipine and ARB    Anemia of chronic disease  - hb stable 9.1     Parkinson's disease- resume sinemet      CKD stage IIIb - creatinine at 2.0 , baseline 1.8     Morbid obesity - need weight loss     Morbid obesity/BETSY    Lung nodule - 11 mm need f.w 3 months     DVT Prophylaxis: Eliquis  Diet: ADULT DIET; Regular;  Low Sodium (2 gm)  Code Status: Full Code       Anju Zapata MD, 10/8/2021 8:41 PM        =

## 2021-10-09 PROCEDURE — 6370000000 HC RX 637 (ALT 250 FOR IP): Performed by: INTERNAL MEDICINE

## 2021-10-09 PROCEDURE — 1200000000 HC SEMI PRIVATE

## 2021-10-09 PROCEDURE — 2580000003 HC RX 258: Performed by: INTERNAL MEDICINE

## 2021-10-09 PROCEDURE — 2500000003 HC RX 250 WO HCPCS: Performed by: INTERNAL MEDICINE

## 2021-10-09 PROCEDURE — 99232 SBSQ HOSP IP/OBS MODERATE 35: CPT | Performed by: INTERNAL MEDICINE

## 2021-10-09 RX ORDER — POTASSIUM CHLORIDE 20 MEQ/1
20 TABLET, EXTENDED RELEASE ORAL
Status: DISCONTINUED | OUTPATIENT
Start: 2021-10-09 | End: 2021-10-10 | Stop reason: HOSPADM

## 2021-10-09 RX ADMIN — APIXABAN 2.5 MG: 5 TABLET, FILM COATED ORAL at 10:35

## 2021-10-09 RX ADMIN — METOPROLOL TARTRATE 25 MG: 25 TABLET, FILM COATED ORAL at 10:38

## 2021-10-09 RX ADMIN — Medication 250 MG: at 12:57

## 2021-10-09 RX ADMIN — CARBIDOPA AND LEVODOPA 1 TABLET: 25; 100 TABLET ORAL at 21:00

## 2021-10-09 RX ADMIN — ATORVASTATIN CALCIUM 40 MG: 40 TABLET, FILM COATED ORAL at 21:00

## 2021-10-09 RX ADMIN — POTASSIUM CHLORIDE, DEXTROSE MONOHYDRATE AND SODIUM CHLORIDE: 150; 5; 450 INJECTION, SOLUTION INTRAVENOUS at 17:19

## 2021-10-09 RX ADMIN — CHOLESTYRAMINE 4 G: 4 POWDER, FOR SUSPENSION ORAL at 10:38

## 2021-10-09 RX ADMIN — CARBIDOPA AND LEVODOPA 1 TABLET: 25; 100 TABLET ORAL at 10:37

## 2021-10-09 RX ADMIN — DULOXETINE HYDROCHLORIDE 60 MG: 60 CAPSULE, DELAYED RELEASE ORAL at 10:38

## 2021-10-09 RX ADMIN — METRONIDAZOLE 500 MG: 500 INJECTION, SOLUTION INTRAVENOUS at 10:46

## 2021-10-09 RX ADMIN — CARBIDOPA AND LEVODOPA 1 TABLET: 25; 100 TABLET ORAL at 17:17

## 2021-10-09 RX ADMIN — POTASSIUM CHLORIDE, DEXTROSE MONOHYDRATE AND SODIUM CHLORIDE: 150; 5; 450 INJECTION, SOLUTION INTRAVENOUS at 00:48

## 2021-10-09 RX ADMIN — PROPAFENONE HYDROCHLORIDE 150 MG: 150 TABLET, FILM COATED ORAL at 23:44

## 2021-10-09 RX ADMIN — Medication 250 MG: at 23:45

## 2021-10-09 RX ADMIN — SODIUM CHLORIDE, PRESERVATIVE FREE 10 ML: 5 INJECTION INTRAVENOUS at 20:03

## 2021-10-09 RX ADMIN — CARBIDOPA AND LEVODOPA 1 TABLET: 25; 100 TABLET ORAL at 12:57

## 2021-10-09 RX ADMIN — Medication 1 CAPSULE: at 10:34

## 2021-10-09 RX ADMIN — CHOLESTYRAMINE 4 G: 4 POWDER, FOR SUSPENSION ORAL at 21:00

## 2021-10-09 RX ADMIN — POTASSIUM CHLORIDE 20 MEQ: 1500 TABLET, EXTENDED RELEASE ORAL at 10:35

## 2021-10-09 RX ADMIN — LAMOTRIGINE 25 MG: 25 TABLET ORAL at 21:00

## 2021-10-09 RX ADMIN — METRONIDAZOLE 500 MG: 500 INJECTION, SOLUTION INTRAVENOUS at 17:22

## 2021-10-09 RX ADMIN — Medication 250 MG: at 05:31

## 2021-10-09 RX ADMIN — Medication 250 MG: at 00:45

## 2021-10-09 RX ADMIN — APIXABAN 2.5 MG: 5 TABLET, FILM COATED ORAL at 20:59

## 2021-10-09 RX ADMIN — MONTELUKAST SODIUM 10 MG: 10 TABLET, COATED ORAL at 21:00

## 2021-10-09 RX ADMIN — LAMOTRIGINE 25 MG: 25 TABLET ORAL at 10:38

## 2021-10-09 RX ADMIN — METRONIDAZOLE 500 MG: 500 INJECTION, SOLUTION INTRAVENOUS at 00:50

## 2021-10-09 RX ADMIN — PROPAFENONE HYDROCHLORIDE 150 MG: 150 TABLET, FILM COATED ORAL at 05:31

## 2021-10-09 RX ADMIN — METOPROLOL TARTRATE 25 MG: 25 TABLET, FILM COATED ORAL at 21:00

## 2021-10-09 RX ADMIN — Medication 250 MG: at 17:26

## 2021-10-09 RX ADMIN — PROPAFENONE HYDROCHLORIDE 150 MG: 150 TABLET, FILM COATED ORAL at 12:56

## 2021-10-09 ASSESSMENT — PAIN SCALES - GENERAL
PAINLEVEL_OUTOF10: 0
PAINLEVEL_OUTOF10: 0

## 2021-10-09 NOTE — PLAN OF CARE
Problem: Diarrhea:  Goal: Bowel elimination is within specified parameters  Description: Bowel elimination is within specified parameters  10/9/2021 0202 by Ernestine Monique RN  Outcome: Ongoing  10/9/2021 0054 by Ernestine Monique RN  Outcome: Ongoing  Goal: Passage of soft, formed stool  Description: Passage of soft, formed stool  10/9/2021 0202 by Ernestine Monique RN  Outcome: Ongoing  10/9/2021 0054 by Ernestine Monique RN  Outcome: Ongoing  Goal: Establishment of normal bowel function will improve to within specified parameters  Description: Establishment of normal bowel function will improve to within specified parameters  10/9/2021 0202 by Ernestine Monique RN  Outcome: Ongoing  10/9/2021 0054 by Ernestine Monique RN  Outcome: Ongoing

## 2021-10-09 NOTE — PLAN OF CARE
Problem: Diarrhea:  Goal: Bowel elimination is within specified parameters  Description: Bowel elimination is within specified parameters  Outcome: Ongoing  Goal: Passage of soft, formed stool  Description: Passage of soft, formed stool  Outcome: Ongoing  Goal: Establishment of normal bowel function will improve to within specified parameters  Description: Establishment of normal bowel function will improve to within specified parameters  Outcome: Ongoing     Problem:  Bowel/Gastric:  Goal: Occurrences of diarrhea will decrease  Description: Occurrences of diarrhea will decrease  Outcome: Ongoing     Problem: Physical Regulation:  Goal: Prevent transmision of infection  Description: Prevent transmision of infection  Outcome: Ongoing  Goal: Ability to avoid or minimize complications of infection will improve  Description: Ability to avoid or minimize complications of infection will improve  Outcome: Ongoing  Goal: Signs and symptoms of infection will decrease  Description: Signs and symptoms of infection will decrease  Outcome: Ongoing     Problem: Fluid Volume:  Goal: Will show no signs and symptoms of electrolyte imbalance  Description: Will show no signs and symptoms of electrolyte imbalance  Outcome: Ongoing     Problem: Physical Regulation:  Goal: Prevent transmision of infection  Description: Prevent transmision of infection  Outcome: Ongoing  Goal: Ability to avoid or minimize complications of infection will improve  Description: Ability to avoid or minimize complications of infection will improve  Outcome: Ongoing  Goal: Signs and symptoms of infection will decrease  Description: Signs and symptoms of infection will decrease  Outcome: Ongoing     Problem: Skin Integrity:  Goal: Risk for impaired skin integrity will decrease  Description: Risk for impaired skin integrity will decrease  Outcome: Ongoing

## 2021-10-09 NOTE — PROGRESS NOTES
WBC 14.7* 12.8* 9.3   HGB 9.7* 9.1* 8.5*   HCT 31.5* 29.4* 27.0*   MCV 88.5 89.3 88.1    342 297     Recent Labs     10/06/21  1020 10/07/21  1735 10/08/21  2245    135* 137   K 4.4 3.7 3.4*    99 106   CO2 24 20* 20*   BUN 23* 30* 24*   CREATININE 1.8* 2.0* 1.6*     Recent Labs     10/07/21  1735   TROPONINI <0.01       Coagulation:   Lab Results   Component Value Date    INR 1.09 04/09/2021    APTT 19.5 04/09/2021     Cardiac markers:   Lab Results   Component Value Date    CKTOTAL 66 09/08/2018    TROPONINI <0.01 10/07/2021         Lab Results   Component Value Date    ALT <5 (L) 10/07/2021    AST 12 (L) 10/07/2021    ALKPHOS 111 10/07/2021    BILITOT <0.2 10/07/2021       Lab Results   Component Value Date    INR 1.09 04/09/2021    INR 1.27 (H) 12/10/2018    INR 1.04 09/08/2018    PROTIME 12.7 04/09/2021    PROTIME 14.4 (H) 12/10/2018    PROTIME 11.9 09/08/2018       Radiology    Ct abd    Mural thickening of the distal transverse to sigmoid colon and diverticulosis   of the descending and sigmoid colon. Elgie Dowdy is mild pericolonic edema, which   is     more focal at the descending/sigmoid junction.  The pattern is most   suggestive of infectious/inflammatory colitis.  Focal component of acute   diverticulitis is still considered, the junction of the descending and   sigmoid colon. No macro perforation or abscess. Hepatic steatosis. Pulmonary nodules measuring up to     11 mm.  Follow-up CT chest is recommended in 3-6 months           XR CHEST PORTABLE   Final Result   No acute cardiopulmonary process               Assessment and Plan    C. difficile colitis  - sec to recent abx use for UTi   - now on vancomycin increase dose to 250 mg q6 hrly.  Continue IV flagyl while in house  - start probiotics in am  - contact plus isolation   - disease prognosis, plan of care and precautions discussed with family     Left lower extremity laceration status post repair- laceration occurred in

## 2021-10-09 NOTE — FLOWSHEET NOTE
10/09/21 1015   Vital Signs   Temp 97.6 °F (36.4 °C)   Temp Source Oral   Pulse 81   Heart Rate Source Monitor   Resp 18   BP (!) 159/67   BP Location Left lower arm   Patient Position Semi fowlers   Level of Consciousness Alert (0)   MEWS Score 1   Oxygen Therapy   SpO2 95 %   O2 Device None (Room air)     Pt a.m. assessment completed, vss, see flow sheet. Pt denies any pain or needs at this time. A.M. meds given to pt.   Temo Fang RN

## 2021-10-09 NOTE — PLAN OF CARE
Problem: Diarrhea:  Goal: Bowel elimination is within specified parameters  Description: Bowel elimination is within specified parameters  10/9/2021 1526 by Marquez Otto RN  Outcome: Ongoing  10/9/2021 0202 by Bruce Villatoro RN  Outcome: Ongoing  Goal: Passage of soft, formed stool  Description: Passage of soft, formed stool  10/9/2021 1526 by Marquez Otto RN  Outcome: Ongoing  10/9/2021 0202 by Bruce Villatoro RN  Outcome: Ongoing  Goal: Establishment of normal bowel function will improve to within specified parameters  Description: Establishment of normal bowel function will improve to within specified parameters  10/9/2021 1526 by Marquez Otto RN  Outcome: Ongoing  10/9/2021 0202 by Bruce Villatoro RN  Outcome: Ongoing     Problem:  Bowel/Gastric:  Goal: Occurrences of diarrhea will decrease  Description: Occurrences of diarrhea will decrease  10/9/2021 1526 by Marquez Otto RN  Outcome: Ongoing  10/9/2021 0202 by Bruce Villatoro RN  Outcome: Ongoing     Problem: Fluid Volume:  Goal: Will show no signs and symptoms of electrolyte imbalance  Description: Will show no signs and symptoms of electrolyte imbalance  10/9/2021 1526 by Marquez Otto RN  Outcome: Ongoing  10/9/2021 0202 by Bruce Villatoro RN  Outcome: Ongoing     Problem: Physical Regulation:  Goal: Prevent transmision of infection  Description: Prevent transmision of infection  10/9/2021 1526 by Marquez Otto RN  Outcome: Ongoing  10/9/2021 0202 by Bruce Villatoro RN  Outcome: Ongoing  Goal: Ability to avoid or minimize complications of infection will improve  Description: Ability to avoid or minimize complications of infection will improve  10/9/2021 1526 by Marquez Otto RN  Outcome: Ongoing  10/9/2021 0202 by Bruce Villatoro RN  Outcome: Ongoing  Goal: Signs and symptoms of infection will decrease  Description: Signs and symptoms of infection will decrease  10/9/2021 1526 by Marquez Otto RN  Outcome: Ongoing  10/9/2021 0202 by Bruce Villatoro RN  Outcome: Ongoing Problem: Skin Integrity:  Goal: Risk for impaired skin integrity will decrease  Description: Risk for impaired skin integrity will decrease  10/9/2021 1526 by Herbert Leon RN  Outcome: Ongoing  10/9/2021 0202 by Cristopher Landon RN  Outcome: Ongoing     Problem: Falls - Risk of:  Goal: Will remain free from falls  Description: Will remain free from falls  Outcome: Ongoing  Goal: Absence of physical injury  Description: Absence of physical injury  Outcome: Ongoing

## 2021-10-10 VITALS
TEMPERATURE: 97.2 F | OXYGEN SATURATION: 95 % | SYSTOLIC BLOOD PRESSURE: 182 MMHG | DIASTOLIC BLOOD PRESSURE: 72 MMHG | HEART RATE: 74 BPM | RESPIRATION RATE: 16 BRPM

## 2021-10-10 LAB
ANION GAP SERPL CALCULATED.3IONS-SCNC: 9 MMOL/L (ref 3–16)
BASOPHILS ABSOLUTE: 0.1 K/UL (ref 0–0.2)
BASOPHILS RELATIVE PERCENT: 0.6 %
BUN BLDV-MCNC: 11 MG/DL (ref 7–20)
CALCIUM SERPL-MCNC: 7.9 MG/DL (ref 8.3–10.6)
CHLORIDE BLD-SCNC: 109 MMOL/L (ref 99–110)
CO2: 19 MMOL/L (ref 21–32)
CREAT SERPL-MCNC: 1.1 MG/DL (ref 0.6–1.2)
EOSINOPHILS ABSOLUTE: 0.3 K/UL (ref 0–0.6)
EOSINOPHILS RELATIVE PERCENT: 3.1 %
GFR AFRICAN AMERICAN: 58
GFR NON-AFRICAN AMERICAN: 48
GLUCOSE BLD-MCNC: 135 MG/DL (ref 70–99)
HCT VFR BLD CALC: 26.3 % (ref 36–48)
HEMOGLOBIN: 8.8 G/DL (ref 12–16)
LYMPHOCYTES ABSOLUTE: 1 K/UL (ref 1–5.1)
LYMPHOCYTES RELATIVE PERCENT: 11.5 %
MCH RBC QN AUTO: 29.1 PG (ref 26–34)
MCHC RBC AUTO-ENTMCNC: 33.5 G/DL (ref 31–36)
MCV RBC AUTO: 86.9 FL (ref 80–100)
MONOCYTES ABSOLUTE: 0.5 K/UL (ref 0–1.3)
MONOCYTES RELATIVE PERCENT: 5.5 %
NEUTROPHILS ABSOLUTE: 6.6 K/UL (ref 1.7–7.7)
NEUTROPHILS RELATIVE PERCENT: 79.3 %
PDW BLD-RTO: 14.2 % (ref 12.4–15.4)
PLATELET # BLD: 352 K/UL (ref 135–450)
PMV BLD AUTO: 7.4 FL (ref 5–10.5)
POTASSIUM SERPL-SCNC: 4.1 MMOL/L (ref 3.5–5.1)
RBC # BLD: 3.03 M/UL (ref 4–5.2)
SODIUM BLD-SCNC: 137 MMOL/L (ref 136–145)
WBC # BLD: 8.3 K/UL (ref 4–11)

## 2021-10-10 PROCEDURE — 36415 COLL VENOUS BLD VENIPUNCTURE: CPT

## 2021-10-10 PROCEDURE — 2500000003 HC RX 250 WO HCPCS: Performed by: INTERNAL MEDICINE

## 2021-10-10 PROCEDURE — 99239 HOSP IP/OBS DSCHRG MGMT >30: CPT | Performed by: INTERNAL MEDICINE

## 2021-10-10 PROCEDURE — 6370000000 HC RX 637 (ALT 250 FOR IP): Performed by: INTERNAL MEDICINE

## 2021-10-10 PROCEDURE — 85025 COMPLETE CBC W/AUTO DIFF WBC: CPT

## 2021-10-10 PROCEDURE — 80048 BASIC METABOLIC PNL TOTAL CA: CPT

## 2021-10-10 RX ORDER — METOPROLOL TARTRATE 50 MG/1
50 TABLET, FILM COATED ORAL 2 TIMES DAILY
Status: DISCONTINUED | OUTPATIENT
Start: 2021-10-10 | End: 2021-10-10 | Stop reason: HOSPADM

## 2021-10-10 RX ORDER — VALSARTAN 80 MG/1
80 TABLET ORAL DAILY
Status: DISCONTINUED | OUTPATIENT
Start: 2021-10-10 | End: 2021-10-10 | Stop reason: HOSPADM

## 2021-10-10 RX ORDER — VANCOMYCIN HYDROCHLORIDE 250 MG/1
250 CAPSULE ORAL 4 TIMES DAILY
Qty: 48 CAPSULE | Refills: 0 | Status: SHIPPED | OUTPATIENT
Start: 2021-10-10 | End: 2021-10-22

## 2021-10-10 RX ORDER — LACTOBACILLUS RHAMNOSUS GG 10B CELL
1 CAPSULE ORAL 2 TIMES DAILY
Qty: 20 CAPSULE | Refills: 0 | Status: SHIPPED | OUTPATIENT
Start: 2021-10-10 | End: 2021-10-20

## 2021-10-10 RX ADMIN — POTASSIUM CHLORIDE 20 MEQ: 1500 TABLET, EXTENDED RELEASE ORAL at 10:25

## 2021-10-10 RX ADMIN — PROPAFENONE HYDROCHLORIDE 150 MG: 150 TABLET, FILM COATED ORAL at 05:35

## 2021-10-10 RX ADMIN — APIXABAN 2.5 MG: 5 TABLET, FILM COATED ORAL at 10:25

## 2021-10-10 RX ADMIN — DULOXETINE HYDROCHLORIDE 60 MG: 60 CAPSULE, DELAYED RELEASE ORAL at 10:25

## 2021-10-10 RX ADMIN — METOPROLOL TARTRATE 50 MG: 50 TABLET, FILM COATED ORAL at 10:26

## 2021-10-10 RX ADMIN — CHOLESTYRAMINE 4 G: 4 POWDER, FOR SUSPENSION ORAL at 10:26

## 2021-10-10 RX ADMIN — Medication 250 MG: at 06:51

## 2021-10-10 RX ADMIN — METRONIDAZOLE 500 MG: 500 INJECTION, SOLUTION INTRAVENOUS at 00:32

## 2021-10-10 RX ADMIN — Medication 250 MG: at 13:11

## 2021-10-10 RX ADMIN — CARBIDOPA AND LEVODOPA 1 TABLET: 25; 100 TABLET ORAL at 13:11

## 2021-10-10 RX ADMIN — CARBIDOPA AND LEVODOPA 1 TABLET: 25; 100 TABLET ORAL at 10:26

## 2021-10-10 RX ADMIN — Medication 1 CAPSULE: at 10:25

## 2021-10-10 RX ADMIN — VALSARTAN 80 MG: 80 TABLET, FILM COATED ORAL at 10:25

## 2021-10-10 RX ADMIN — LAMOTRIGINE 25 MG: 25 TABLET ORAL at 10:25

## 2021-10-10 RX ADMIN — METRONIDAZOLE 500 MG: 500 INJECTION, SOLUTION INTRAVENOUS at 10:34

## 2021-10-10 RX ADMIN — PROPAFENONE HYDROCHLORIDE 150 MG: 150 TABLET, FILM COATED ORAL at 13:11

## 2021-10-10 ASSESSMENT — PAIN SCALES - GENERAL: PAINLEVEL_OUTOF10: 0

## 2021-10-10 NOTE — PLAN OF CARE
Problem: Diarrhea:  Goal: Bowel elimination is within specified parameters  Description: Bowel elimination is within specified parameters  Outcome: Ongoing     Problem:  Bowel/Gastric:  Goal: Occurrences of diarrhea will decrease  Description: Occurrences of diarrhea will decrease  Outcome: Ongoing     Problem: Fluid Volume:  Goal: Will show no signs and symptoms of electrolyte imbalance  Description: Will show no signs and symptoms of electrolyte imbalance  Outcome: Ongoing     Problem: Skin Integrity:  Goal: Risk for impaired skin integrity will decrease  Description: Risk for impaired skin integrity will decrease  Outcome: Ongoing     Problem: Falls - Risk of:  Goal: Will remain free from falls  Description: Will remain free from falls  Outcome: Ongoing

## 2021-10-10 NOTE — CARE COORDINATION
DISCHARGE ORDER  Date/Time 10/10/2021 10:19 AM  Completed by: Stan Jack RN, Case Management    Patient Name: Jim Daily      : 1940  Admitting Diagnosis: Dehydration [E86.0]  Clostridium difficile colitis [A04.72]  Pulmonary nodules [R91.8]  C. difficile colitis [A04.72]  Laceration of left lower extremity, initial encounter [S81.812A]  Acute on chronic respiratory failure with hypoxia (HCC) [J96.21]  Acute kidney injury superimposed on chronic kidney disease ([facility] [address] [N17.9, N18.9]      Admit order Date and Status: IP 10/07/2021  (verify MD's last order for status of admission)      Noted discharge order. If applicable PT/OT recommendation at Discharge: NA  DME recommendation by PT/OT: NA  Confirmed discharge plan with patient and spouse  Discharge Plan: Pt will return home in care of spouse today. BOBY w/ [facility] (SN/PT/OT) Orders, MICA,AVS and clinicals faxed to [facility]. [facility]wait benefits check [facility]com[facility]. Vanco and lactobacillis called into [employer] at [facility]. Pt will be provided hard copy of scripts. Reviewed chart. Role of discharge planner explained and patient verbalized understanding. Discharge order is noted. PA for vanco sent to [facility]. Approved and co-pay will be $20. Pt and spouse agreeable. Has Home O2 in place on admit:  No  Informed of need to bring portable home O2 tank on day of discharge for nursing to connect prior to leaving:   No  Verbalized agreement/Understanding:   Not Indicated  Pt is being d/c'd to home today. Pt's O2 sats are 94% on RA. Discharge timeout done with [facility]. All discharge needs and concerns addressed.

## 2021-10-10 NOTE — PROGRESS NOTES
IM Progress Note    Admit Date:  10/7/2021  3    Interval history:  c diff colitis , gen weakness     Subjective:  Ms. Keith Fitch seen up in bed, diarrhea improving. Two BM semisolid this am  No abd pain or fevers  Pt denies any issues this am       Objective:   BP (!) 169/74   Pulse 89   Temp 97.6 °F (36.4 °C) (Oral)   Resp 16   SpO2 94%       Intake/Output Summary (Last 24 hours) at 10/10/2021 0743  Last data filed at 10/9/2021 1722  Gross per 24 hour   Intake 2743.05 ml   Output 1 ml   Net 2742.05 ml       Physical Exam:      General: obese female, up in bed   Left opaque cornea   Awake, alert and oriented. Appears to be not in any distress  Mucous Membranes:  Pink , anicteric  Neck: No JVD, no carotid bruit, no thyromegaly  Chest:  Clear to auscultation bilaterally, no added sounds  Cardiovascular:  RRR S1S2 heard, no murmurs or gallops  Abdomen:  Soft, obese,  Resolved  tenderness  undistended, , no organomegaly, BS present  Extremities: left leg laceration wound healthy with sutures   No edema or cyanosis.  Distal pulses well felt  Neurological : grossly normal        Medications:   Scheduled Medications:    potassium chloride  20 mEq Oral Daily with breakfast    vancomycin  250 mg Oral 4 times per day    metroNIDAZOLE  500 mg IntraVENous Q8H    apixaban  2.5 mg Oral BID    atorvastatin  40 mg Oral Nightly    carbidopa-levodopa  1 tablet Oral 4x Daily    DULoxetine  60 mg Oral Daily    lamoTRIgine  25 mg Oral BID    metoprolol tartrate  25 mg Oral BID    montelukast  10 mg Oral Nightly    propafenone  150 mg Oral 3 times per day    sodium chloride flush  5-40 mL IntraVENous 2 times per day    cholestyramine  1 packet Oral BID    lactobacillus  1 capsule Oral Daily with breakfast     I   sodium chloride      dextrose 5% and 0.45% NaCl with KCl 20 mEq 75 mL/hr at 10/09/21 2003     sodium chloride flush, sodium chloride, ondansetron **OR** ondansetron, polyethylene glycol, acetaminophen **OR** while transferring from wheelchair. Sutures in place. Need removal in 10 days  Wound appears healthy    Permanent atrial fibrillation on Eliquis- continue rythmol, rate controlled with metoprolol . Continue AC with eliquis 2.5 mgbid     Hypertension- stable on BB - held  nifedipine and ARB initially , now resuming     Anemia of chronic disease  - hb stable 9.1     Parkinson's disease- resume sinemet      CKD stage IIIb - creatinine at 2.0 on adm, improved to 1.6 with hydration,  baseline 1.8     Morbid obesity - need weight loss     Morbid obesity/BETSY    Lung nodule - 11 mm need f.w 3 months -f.w Dr. Jasmin Vizcarra , has repeat ct scheduled     DVT Prophylaxis: Eliquis  Diet: ADULT DIET; Regular;  Low Sodium (2 gm)  Code Status: Full Code    Pt reports she does not do much activity at home  Only ambulates to bathroom with walker     Dc planning ok          Perry Hughes MD, 10/10/2021 7:43 AM

## 2021-10-10 NOTE — PROGRESS NOTES
Pt a/o. Am assessment completed see flow sheet. Assisted pt to bed pan, small amount of urine only. Changed diaper and bed pad. No other needs voiced at this time. Call light within reach.

## 2021-10-10 NOTE — DISCHARGE INSTR - COC
Continuity of Care Form    Patient Name: Ronnell Earl   :  1940  MRN:  [id number]    [facility] date:  10/7/2021  Discharge date:  10/10/2021    Code Status Order: Full Code   Advance Directives:     Admitting Physician:  Naya Quigley MD  PCP: Lucas Max MD    Discharging Nurse: THE [facility] Unit/Room#: 4860/5157-49  Discharging Unit Phone Number: [phone]    Emergency Contact:   Extended Emergency Contact Information  Primary Emergency Contact:  Wesly Bowen LEIDY  Address: [address] [place] [address]  Home Phone: [phone]  Mobile Phone: [phone]  Relation: Spouse    Past Surgical History:  Past Surgical History:   Procedure Laterality Date    APPENDECTOMY      CAROTID ENDARTERECTOMY      left    CATARACT REMOVAL      CERVICAL FUSION  2010    CHOLECYSTECTOMY      COLONOSCOPY N/A 3/8/2020    COLONOSCOPY FLEXIBLE W/ DECOMPRESSION performed by Davie Mc MD at [facility]      IR MIDLINE CATH  10/8/2021    IR MIDLINE CATH 10/8/2021 [facility]    JOINT REPLACEMENT Bilateral     KNEE SURGERY      replacement x 2    SHOULDER SURGERY      TONSILLECTOMY      TUBAL LIGATION      UPPER GASTROINTESTINAL ENDOSCOPY  2020    Esophagitis    UPPER GASTROINTESTINAL ENDOSCOPY N/A 3/17/2020    EGD performed by Hari Moore MD at [address] [place] [address]         Immunization History:   Immunization History   Administered Date(s) Administered    COVID-19, Pfizer, PF, 30mcg/0.3mL 02/10/2021, 2021    Hepatitis A 2019    Hepatitis A Adult (Havrix, Vaqta) 2019, 2019    Hepatitis A Adult (Vaqta) 2019    Influenza A ([id number]) Vaccine PF IM 2010    Influenza Vaccine, unspecified formulation 2016, 2017    Influenza Virus Vaccine 2011, 10/01/2012, 2016    Influenza Whole 09/02/2015    Influenza, High Dose (Fluzone 65 yrs and older) 09/09/2015, 08/15/2016, 09/15/2017, 09/14/2018, 09/21/2020    Influenza, High-dose, Con Egan, 65 yrs +, IM (Fluzone) 09/21/2020    Influenza, Triv, inactivated, subunit, adjuvanted, IM (Fluad 65 yrs and older) 09/27/2019    Pneumococcal Conjugate 13-valent (Potpacm81) 07/28/2015    Pneumococcal Polysaccharide ([id number]) 11/26/2007    Tdap (Boostrix, Adacel) 03/30/2021    Zoster Recombinant (Shingrix) 05/08/2019, 12/17/2019       Active Problems:  Patient Active Problem List   Diagnosis Code    GERD (gastroesophageal reflux disease) K21.9    Essential hypertension I10    Hyperlipidemia E78.5    Restless leg syndrome G25.81    Anemia of chronic disease D63.8    Postural dizziness R42    Moderate persistent asthma J45.40    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-sided sciatica M54.41, G89.29    Morbid obesity with BMI of 40.0-44.9, adult ([facility]) E66.01, Z68.41    Type 2 diabetes mellitus with stage 4 chronic kidney disease, without long-term current use of insulin ([facility]) E11.22, N18.4    CAD (coronary artery disease) I25.10    Parkinson's disease ([facility] [address] G20    Severe obstructive sleep apnea G47.33    Hypothyroidism E03.9    Hepatic steatosis K76.0    Diverticulosis K57.90    Blind left eye H54.40    L carotid artery stenosis s/p CEA I65.29    PAF (paroxysmal atrial fibrillation) ([facility]) I48.0    COPD (chronic obstructive pulmonary disease) ([facility]) H95.7    Diastolic congestive heart failure ([facility]) I50.30    Partial symptomatic epilepsy with complex partial seizures, not intractable, without status epilepticus ([facility]) G40.209    Chronic kidney disease, stage 4 (severe) ([facility]) N18.4    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 difficile colitis A04.72    Dehydration E86.0  Laceration of left lower extremity S81.812A    Pulmonary nodules R91.8       Isolation/Infection:   Isolation          C Diff Contact        Patient Infection Status     Infection Onset Added Last Indicated Last Indicated By Review Planned Expiration Resolved Resolved By    C-diff (Clostridium difficile)  10/08/21 10/08/21 Tami Rivera RN 10/15/21       Resolved    C-diff Rule Out 10/07/21 10/07/21 10/07/21 GI Bacterial Pathogens By PCR (Ordered)   10/07/21 Rule-Out Test Resulted          Nurse Assessment:  Last Vital Signs: BP (!) 169/74   Pulse 89   Temp 97.6 °F (36.4 °C) (Oral)   Resp 16   SpO2 94%     Last documented pain score (0-10 scale): Pain Level: 0  Last Weight:   Wt Readings from Last 1 Encounters:   10/06/21 273 lb (123.8 kg)     Mental Status:  oriented and alert    IV Access:  - None    Nursing Mobility/ADLs:  Walking   Assisted  Transfer  Assisted  Bathing  Assisted  Dressing  Assisted  Toileting  Assisted  Feeding  Independent  Med Admin  Independent  Med Delivery   whole    Wound Care Documentation and Therapy:  Wound 12/10/18 Buttocks Right Stage II pressure ulcer (Active)   Number of days: 1034        Elimination:  Continence:   · Bowel: Yes  · Bladder: Yes  Urinary Catheter: None   Colostomy/Ileostomy/Ileal Conduit: No       Date of Last BM: 10/10/2021    Intake/Output Summary (Last 24 hours) at 10/10/2021 1006  Last data filed at 10/9/2021 1722  Gross per 24 hour   Intake 2743.05 ml   Output 1 ml   Net 2742.05 ml     I/O last 3 completed shifts: In: 2743.1 [P.O.:400; I.V.:1979.9; IV Piggyback:363.2]  Out: 1 [Urine:1]    Safety Concerns: At Risk for Falls    Impairments/Disabilities:      Vision    Nutrition Therapy:  Current Nutrition Therapy:   - Oral Diet:  General    Routes of Feeding: Oral  Liquids:  Thin Liquids  Daily Fluid Restriction: no  Last Modified Barium Swallow with Video (Video Swallowing Test): not done    Treatments at the Time of Hospital Discharge:   Respiratory Treatments: n/a  Oxygen Therapy:  is not on home oxygen therapy. Ventilator:    - No ventilator support    Rehab Therapies: Physical Therapy and Occupational Therapy  Weight Bearing Status/Restrictions: No weight bearing restirctions  Other Medical Equipment (for information only, NOT a DME order):  walker  Other Treatments: n/a    Patient's personal belongings (please select all that are sent with patient):  None    RN SIGNATURE:  Electronically signed by Nicol Marcial RN on 10/10/21 at 3:43 PM EDT    CASE MANAGEMENT/SOCIAL WORK SECTION    Inpatient Status Date: 10/07/2021    Readmission Risk Assessment Score:  Readmission Risk              Risk of Unplanned Readmission:  24           Discharging to Facility/ Agency   · [facility]: [facility]  · Address:  · Phone: [phone]  · Fax:    Dialysis Facility (if applicable)   · Name:  · Address:  · Dialysis Schedule:  · Phone:  · Fax:    / signature: Electronically signed by Tierney Hopkins RN on 10/10/21 at 10:08 AM EDT    PHYSICIAN SECTION    Prognosis: {Prognosis:[id number]}    Condition at Discharge: [address] [place] [address] Patient Condition:[id number]}    Rehab Potential (if transferring to Rehab): {Prognosis:[id number]}    Recommended Labs or Other Treatments After Discharge: resumption of prior services    Physician Certification: I certify the above information and transfer of Roxanne Padilla  is necessary for the continuing treatment of the diagnosis listed and that she requires Home Care for less 30 days.      Update Admission H&P: Changes in H&P as follows - see attached    PHYSICIAN SIGNATURE: MESSI Hawk MD/  Electronically signed by Tierney Hopkins RN on 10/10/21 at 10:09 AM EDT

## 2021-10-10 NOTE — DISCHARGE SUMMARY
Name:  Teresa Ramirez  Room:  2211/9787-06  MRN:    [id number]    Discharge Summary      This discharge summary is in conjunction with a complete physical exam done on the day of discharge. Attending Physician: Dr. Bethanie Rich  Discharging Physician: Dr. Kat Deras: 10/7/2021  Discharge:   10/10/2021    HPI:  80 y.o. female who presented to the hospital with a chief complaint of diarrhea. The patient has been having abdominal cramping and diarrheal symptoms for a week. Was recently treated for UTI late last month. Actually her PCP started her on p.o. vancomycin but her symptoms continued. Stool samples were obtained and came back positive for C. difficile, she was sent to the emergency department for evaluation. Unfortunately in a way into the ER, she suffered a laceration to her left lower extremity which has since been repaired in emergency department. She will be admitted for C. difficile colitis and abdominal pain.     She is vaccinated for the Covid. Diagnoses this Admission and Hospital Course      C. difficile colitis  - sec to recent abx use for UTi   - now on oral vancomycin 250 mg q6 hrly. Continued IV flagyl while in house  - started probiotics   - contact plus isolation   - disease prognosis, plan of care and precautions discussed with family      Left lower extremity laceration status post repair- laceration occurred in ER while transferring from wheelchair. Sutures in place. Need removal in 10 days  Wound appears healthy     Permanent atrial fibrillation on Eliquis- continued rythmol, rate controlled with metoprolol .  Continued AC with eliquis 2.5 mgbid      Hypertension- stable on BB - held nifedipine and ARB initially , now resuming      Anemia of chronic disease  - hgb stable 9.1      Parkinson's disease- resumed sinemet        CKD stage IIIb - creatinine at 2.0 on adm, improved to 1.6 with hydration,  baseline 1.8      Morbid obesity - need weight loss      Morbid obesity/BETSY     Lung nodule - 11 mm need f.w 3 months -f.w Dr. Aarti Solitario , has repeat ct scheduled      DVT Prophylaxis: Eliquis     Pt reports she does not do much activity at home  Only ambulates to bathroom with walker             Procedures (Please Review Full Report for Details)  Midline cath placement    Consults    N/A      Physical Exam at Discharge:    BP (!) 169/74   Pulse 89   Temp 97.6 °F (36.4 °C) (Oral)   Resp 16   SpO2 94%     See today's progress note    CBC: Recent Labs     10/07/21  1735 10/08/21  2245 10/10/21  0905   WBC 12.8* 9.3 8.3   HGB 9.1* 8.5* 8.8*   HCT 29.4* 27.0* 26.3*   MCV 89.3 88.1 86.9    297 352     BMP:   Recent Labs     10/07/21  1735 10/08/21  2245 10/10/21  0905   * 137 137   K 3.7 3.4* 4.1   CL 99 106 109   CO2 20* 20* 19*   BUN 30* 24* 11   CREATININE 2.0* 1.6* 1.1     LIVER PROFILE:   Recent Labs     10/07/21  1735   AST 12*   ALT <5*   LIPASE 17.0   BILITOT <0.2   ALKPHOS 111       CARDIAC ENZYMES  Recent Labs     10/07/21  1735   TROPONINI <0.01       U/A:    Lab Results   Component Value Date    NITRITE neg 03/09/2011    COLORU Yellow 09/27/2021    WBCUA >100 09/27/2021    RBCUA 0-2 09/27/2021    MUCUS 3+ 02/27/2017    BACTERIA 4+ 09/27/2021    CLARITYU CLOUDY 09/27/2021    SPECGRAV 1.015 09/27/2021    LEUKOCYTESUR LARGE 09/27/2021    BLOODU Negative 09/27/2021    GLUCOSEU Negative 09/27/2021       Stool - c diff      covid - not detected    RADIOLOGY  IR MIDLINE CATH   Final Result   Successful placement of midline catheter. CT ABDOMEN PELVIS WO CONTRAST Additional Contrast? None   Final Result   Mural thickening of the distal transverse to sigmoid colon and diverticulosis   of the descending and sigmoid colon. There is mild pericolonic edema, which   is      more focal at the descending/sigmoid junction. The pattern is most   suggestive of infectious/inflammatory colitis.   Focal component of acute   diverticulitis is still considered, the junction of the descending and   sigmoid colon. No macro perforation or abscess. Hepatic steatosis. Pulmonary nodules measuring up to      11 mm. Follow-up CT chest is recommended in 3-6 months. RECOMMENDATIONS:   Fleischner Society guidelines for follow-up and management of incidentally   detected pulmonary nodules:      Multiple Solid Nodules:      Nodule size greater than 8 mm   In a low-risk patient, CT at 3-6 months, then consider CT at 18-24 months. In a high-risk patient, CT at 3-6 months, then CT at 18-24 months. - Low risk patients include individuals with minimal or absent history of   smoking and other known risk factors. - High risk patients include individuals with a history or smoking or known   risk factors. Radiology 2017 http://pubs. rsna.org/doi/full/10.1148/radiol. 1742444645         XR CHEST PORTABLE   Final Result   No acute cardiopulmonary process               Discharge Medications     Medication List      START taking these medications    lactobacillus capsule  Take 1 capsule by mouth 2 times daily for 10 days        CHANGE how you take these medications    vancomycin 250 MG capsule  Commonly known as: VANCOCIN  Take 1 capsule by mouth 4 times daily for 12 days  What changed:   · medication strength  · how much to take  · Another medication with the same name was removed. Continue taking this medication, and follow the directions you see here. CONTINUE taking these medications    albuterol sulfate  (90 Base) MCG/ACT inhaler  INHALE 2 PUFFS INTO THE LUNGS EVERY 6 HOURS AS NEEDED FOR WHEEZING     Alpha-Lipoic Acid 300 MG Tabs     apixaban 2.5 MG Tabs tablet  Commonly known as: Eliquis  Take 1 tablet by mouth 2 times daily     aspirin EC 81 MG EC tablet  Take 1 tablet by mouth daily. Start taking next week.   Indications: OTC     atorvastatin 40 MG tablet  Commonly known as: LIPITOR  Take 1.5 tablets by mouth daily     Breo Ellipta 100-25 MCG/INH Aepb inhaler  Generic drug: fluticasone-vilanterol  INHALE 1 PUFF EVERY DAY     carbidopa-levodopa  MG per tablet  Commonly known as: SINEMET     DULoxetine 60 MG extended release capsule  Commonly known as: CYMBALTA  TAKE 1 CAPSULE EVERY DAY     ferrous sulfate 325 (65 Fe) MG tablet  Commonly known as: IRON 325     fluticasone 50 MCG/ACT nasal spray  Commonly known as: FLONASE  USE 2 PUFFS IN EACH NOSTRIL DAILY     furosemide 40 MG tablet  Commonly known as: LASIX  Take 0.5 tablets by mouth daily     Handicap Placard Misc  by Does not apply route DX: G20  parkinson's disease  Exp: 9/1/2024     ipratropium-albuterol 0.5-2.5 (3) MG/3ML Soln nebulizer solution  Commonly known as: DuoNeb  Inhale 3 mLs into the lungs every 6 hours as needed for Shortness of Breath DX: J45.40     lamoTRIgine 25 MG tablet  Commonly known as: LAMICTAL     metoprolol tartrate 50 MG tablet  Commonly known as: LOPRESSOR  TAKE 1 TABLET TWICE DAILY     montelukast 10 MG tablet  Commonly known as: SINGULAIR  TAKE 1 TABLET EVERY DAY     NIFEdipine 60 MG extended release tablet  Commonly known as: ADALAT CC  Take 1 tablet by mouth daily     propafenone 150 MG tablet  Commonly known as: RYTHMOL  Take 1 tablet by mouth every 8 hours     therapeutic multivitamin-minerals tablet     valsartan 160 MG tablet  Commonly known as: DIOVAN           Where to Get Your Medications      You can get these medications from any pharmacy    Bring a paper prescription for each of these medications  · lactobacillus capsule  · vancomycin 250 MG capsule           Discharged in stable condition to home. Follow Up: Follow up with PCP.     Anjana Barnett MD, 10/19/2021 1:19 PM

## 2021-10-11 ENCOUNTER — TELEPHONE (OUTPATIENT)
Dept: FAMILY MEDICINE CLINIC | Age: 81
End: 2021-10-11

## 2021-10-11 ENCOUNTER — CARE COORDINATION (OUTPATIENT)
Dept: CASE MANAGEMENT | Age: 81
End: 2021-10-11

## 2021-10-11 DIAGNOSIS — A04.72 C. DIFFICILE COLITIS: Primary | ICD-10-CM

## 2021-10-11 PROCEDURE — 1111F DSCHRG MED/CURRENT MED MERGE: CPT | Performed by: INTERNAL MEDICINE

## 2021-10-11 NOTE — CARE COORDINATION
[facility] Transitions Initial Follow Up Call    Call within 2 business days of discharge: Yes    Patient: Augustine Fritz Patient : 1940   MRN: [id number]  Reason for Admission: C Diff, LLE laceration s/p repair (sutures needs removed on Day 10 on 10/17/2021), Permanent A Fib on Eliquis, HTN, anemia of chronic disease, Parkinson's disease, CKD3b, morbid obesity, BETSY, lung nodule (3 mos f/up with Real Erazo)  Discharge Date: 10/10/21 RARS: Readmission Risk Score: 24      Last Discharge [address] [address]       Complaint Diagnosis Description Type Department Provider    10/7/21 Leg Pain; Diarrhea C. difficile colitis . .. ED to Hosp-Admission (Discharged) (ADMITTED) [address] PINEDA Cruz MD; Kira Hilario. .. Spoke with: Mr Arcadio Kiser (spouse) - ok per hipaa    Facility: [facility]    Non-face-to-face services provided:  Obtained and reviewed discharge summary and/or continuity of care documents  Education of patient/family/caregiver/guardian to support self-management-C Diff education  Assessment and support for treatment adherence and medication management-1111F completed    Was this an external facility discharge? No Discharge Facility: NA    Challenges to be reviewed by the provider   Additional needs identified to be addressed with provider yes: will need sutures removed around 10/17/2021       Method of communication with provider : chart routing    Advance Care Planning:   Does patient have an Advance Directive:  reviewed and current. Was this a readmission? No  Patient stated reason for admission: diarrhea  Patients top risk factors for readmission: functional cognitive ability and medical condition-     Care Transition Nurse (CTN) contacted the caregiver by telephone to perform post hospital discharge assessment. Provided introduction to self, and explanation of the CTN role.      CTN reviewed discharge instructions, medical action plan and red flags with caregiver who verbalized understanding. Caregiver given an opportunity to ask questions and does not have any further questions or concerns at this time. Were discharge instructions available to patient? Yes. Reviewed appropriate site of care based on symptoms and resources available to patient including: PCP, Specialist and Home health. The family agrees to contact the PCP office for questions related to their healthcare. Medication reconciliation was performed with family, who verbalizes understanding of administration of home medications. COVID Risk Education    MATT-49 Not Detected on 10/7/2021. Patient completed Pfizer 2-step COVID vaccination on 3/3/2021. Caregiver was given an opportunity to verbalize any questions and concerns and agrees to contact CTN or health care provider for questions related to their healthcare. Was patient discharged with a pulse oximeter? No     Spoke with spouse and patient in background. She is tolerating PO. Denies fever, chills, abd pain. The bandage to LLE is CDI. States it is a large bandaid (Mepilex possibly?). She denies issues with this at this time. Educated on C Diff. Encouraged private bathroom, washing with soap and water, cleaning with bleach. States she is using BSC and he is wearing gloves and cleaning appropriately. [facility] has not resumed services yet, he thought it would be today but the nurse has not called yet. CTN will follow up on [facility]. referral. He denies needs otherwise. Reviewed appointments scheduled as recommended. CTN provided contact information for future needs. Plan for follow-up call in 3-5 days based on severity of symptoms and risk factors.       Care Transitions 24 Hour Call    Schedule Follow Up Appointment with PCP: Completed  Do you have any ongoing symptoms?: No  Do you have a copy of your discharge instructions?: Yes  Do you have all of your prescriptions and are they filled?: Yes  Have you been contacted by a [facility] Pharmacist?: No  Have you scheduled your follow up appointment?: Yes  How are you going to get to your appointment?: Car - family or friend to transport  Were you discharged with any Home Care or Post Acute Services: Yes  Post Acute Services: Home Health (Comment: [facility])  Patient DME: Shower chair, [employer], Wheelchair, Straight cane, Crutches, Other  Other Patient DME: grab bar in shower and around toilet, long handled sponge  Patient Home Equipment: Nebulizer, BiPAP, Oxygen  Do you have support at home?: Partner/Spouse/SO  Do you feel like you have everything you need to keep you well at home?: Yes  Are you an active caregiver in your home?: No  Care Transitions Interventions  No Identified Needs         Follow Up  Future Appointments   Date Time Provider Katy Bokoer   10/20/2021 10:00 AM MAHAD Major CNP [facility] [place] - [facility]   11/29/2021  1:30 PM Lety Garland MD [facility] - [place]   11/30/2021  9:00 AM MAHAD Bustamante CNP [facility]   12/2/2021  9:00 AM MD Sheron Morfin [facility]   12/21/2021  9:30 AM MHC [facility]   12/28/2021 10:30 AM Katlyn Rhodes MD [facility]       Ramiro Paredes RN

## 2021-10-11 NOTE — TELEPHONE ENCOUNTER
[facility] Transitions Initial Follow Up Call    Outreach made within 2 business days of discharge: Yes    Patient: Isamar Woodson Patient : 1940   MRN: <[id number]>  Reason for Admission: There are no discharge diagnoses documented for the most recent discharge. Discharge Date: 10/10/21       Spoke with: left message for return call    Discharge department/facility: [facility]    Non-face-to-face services provided:  Scheduled appointment with PCP-10/20/2021  Obtained and reviewed discharge summary and/or continuity of care documents    TCM Interactive Patient Contact:  Was patient able to fill all prescriptions: yes    Was patient instructed to bring all medications to the follow-up visit: yes     Is patient taking all medications as directed in the discharge summary?  yes    Does patient understand their discharge instructions: yes    Does patient have questions or concerns that need addressed prior to 7-14 day follow up office visit: no        Scheduled appointment with PCP within 7-14 days    Follow Up  Future Appointments   Date Time Provider Katy Booker   10/20/2021 10:00 AM MAHAD Roberts CNP  [place] - [place]   2021  1:30 PM MD BIGG Mixon  [place] - [facility]   2021  9:00 AM MAHAD Moreland CNP [facility]   2021  9:00 AM MD Melissa Dietrich [facility]   2021  9:30 AM MHC CT MAIN [facility]   2021 10:30 AM MD Za Ramos [facility]       Denita Burnham RN

## 2021-10-12 ENCOUNTER — TELEPHONE (OUTPATIENT)
Dept: FAMILY MEDICINE CLINIC | Age: 81
End: 2021-10-12

## 2021-10-12 NOTE — TELEPHONE ENCOUNTER
[facility] advising they are resuming home care with Aimee Carvajal in regards to her recent admission.

## 2021-10-13 NOTE — TELEPHONE ENCOUNTER
LVM for Sophie Rodriguez with [facility], okerasto Verbal for keep area clean and dry with dressing as needed. Asked for call back if there is an open wound or drainage in addition to the stitches.

## 2021-10-13 NOTE — TELEPHONE ENCOUNTER
[facility] calling because when pt was getting out of the car at the hospital she dit her left lower leg very hard and had to get stiches. The stiches are going to be taken out at pts follow up apt with Dr. Yaz Knowles 10/20. [employer] nurse  is wondering if its okay that she uses mepilex dressing ad changes due to needing changed every 5-7 days.  Please Advise [phone]

## 2021-10-15 ENCOUNTER — CARE COORDINATION (OUTPATIENT)
Dept: CASE MANAGEMENT | Age: 81
End: 2021-10-15

## 2021-10-20 ENCOUNTER — HOSPITAL ENCOUNTER (OUTPATIENT)
Dept: GENERAL RADIOLOGY | Age: 81
Discharge: HOME | End: 2021-10-20
Payer: MEDICARE

## 2021-10-20 ENCOUNTER — HOSPITAL ENCOUNTER (OUTPATIENT)
Age: 81
Discharge: HOME | End: 2021-10-20
Payer: MEDICARE

## 2021-10-20 ENCOUNTER — TELEPHONE (OUTPATIENT)
Dept: FAMILY MEDICINE CLINIC | Age: 81
End: 2021-10-20

## 2021-10-20 ENCOUNTER — OFFICE VISIT (OUTPATIENT)
Dept: FAMILY MEDICINE CLINIC | Age: 81
End: 2021-10-20
Payer: MEDICARE

## 2021-10-20 VITALS — OXYGEN SATURATION: 92 % | SYSTOLIC BLOOD PRESSURE: 128 MMHG | DIASTOLIC BLOOD PRESSURE: 90 MMHG | HEART RATE: 68 BPM

## 2021-10-20 DIAGNOSIS — S81.812D LACERATION OF LEFT LOWER EXTREMITY, SUBSEQUENT ENCOUNTER: ICD-10-CM

## 2021-10-20 DIAGNOSIS — A04.72 C. DIFFICILE COLITIS: ICD-10-CM

## 2021-10-20 DIAGNOSIS — R06.02 SHORTNESS OF BREATH: ICD-10-CM

## 2021-10-20 DIAGNOSIS — I48.0 PAF (PAROXYSMAL ATRIAL FIBRILLATION) (HCC): ICD-10-CM

## 2021-10-20 DIAGNOSIS — J18.9 PNEUMONIA OF LEFT LOWER LOBE DUE TO INFECTIOUS ORGANISM: ICD-10-CM

## 2021-10-20 DIAGNOSIS — D64.9 ANEMIA, UNSPECIFIED TYPE: ICD-10-CM

## 2021-10-20 DIAGNOSIS — A04.72 C. DIFFICILE COLITIS: Primary | ICD-10-CM

## 2021-10-20 DIAGNOSIS — N18.4 CHRONIC KIDNEY DISEASE, STAGE 4 (SEVERE) (HCC): ICD-10-CM

## 2021-10-20 LAB
ALBUMIN SERPL-MCNC: 3.7 G/DL (ref 3.4–5)
ANION GAP SERPL CALCULATED.3IONS-SCNC: 13 MMOL/L (ref 3–16)
BASOPHILS ABSOLUTE: 0.1 K/UL (ref 0–0.2)
BASOPHILS RELATIVE PERCENT: 0.6 %
BUN BLDV-MCNC: 15 MG/DL (ref 7–20)
CALCIUM SERPL-MCNC: 9.5 MG/DL (ref 8.3–10.6)
CHLORIDE BLD-SCNC: 100 MMOL/L (ref 99–110)
CO2: 30 MMOL/L (ref 21–32)
CREAT SERPL-MCNC: 1.1 MG/DL (ref 0.6–1.2)
EOSINOPHILS ABSOLUTE: 0.2 K/UL (ref 0–0.6)
EOSINOPHILS RELATIVE PERCENT: 1.8 %
GFR AFRICAN AMERICAN: 58
GFR NON-AFRICAN AMERICAN: 48
GLUCOSE BLD-MCNC: 110 MG/DL (ref 70–99)
HCT VFR BLD CALC: 31.5 % (ref 36–48)
HEMOGLOBIN: 9.7 G/DL (ref 12–16)
LYMPHOCYTES ABSOLUTE: 1.4 K/UL (ref 1–5.1)
LYMPHOCYTES RELATIVE PERCENT: 10.7 %
MCH RBC QN AUTO: 27 PG (ref 26–34)
MCHC RBC AUTO-ENTMCNC: 30.7 G/DL (ref 31–36)
MCV RBC AUTO: 88 FL (ref 80–100)
MONOCYTES ABSOLUTE: 0.9 K/UL (ref 0–1.3)
MONOCYTES RELATIVE PERCENT: 6.9 %
NEUTROPHILS ABSOLUTE: 10.6 K/UL (ref 1.7–7.7)
NEUTROPHILS RELATIVE PERCENT: 80 %
PDW BLD-RTO: 15.4 % (ref 12.4–15.4)
PHOSPHORUS: 4.1 MG/DL (ref 2.5–4.9)
PLATELET # BLD: 379 K/UL (ref 135–450)
PMV BLD AUTO: 8.3 FL (ref 5–10.5)
POTASSIUM SERPL-SCNC: 4.6 MMOL/L (ref 3.5–5.1)
PRO-BNP: 1172 PG/ML (ref 0–449)
RBC # BLD: 3.57 M/UL (ref 4–5.2)
SODIUM BLD-SCNC: 143 MMOL/L (ref 136–145)
WBC # BLD: 13.2 K/UL (ref 4–11)

## 2021-10-20 PROCEDURE — G0008 ADMIN INFLUENZA VIRUS VAC: HCPCS | Performed by: NURSE PRACTITIONER

## 2021-10-20 PROCEDURE — 71046 X-RAY EXAM CHEST 2 VIEWS: CPT

## 2021-10-20 PROCEDURE — 90694 VACC AIIV4 NO PRSRV 0.5ML IM: CPT | Performed by: NURSE PRACTITIONER

## 2021-10-20 PROCEDURE — 99495 TRANSJ CARE MGMT MOD F2F 14D: CPT | Performed by: NURSE PRACTITIONER

## 2021-10-20 PROCEDURE — 1111F DSCHRG MED/CURRENT MED MERGE: CPT | Performed by: NURSE PRACTITIONER

## 2021-10-20 RX ORDER — AZITHROMYCIN 250 MG/1
250 TABLET, FILM COATED ORAL SEE ADMIN INSTRUCTIONS
Qty: 6 TABLET | Refills: 0 | Status: SHIPPED | OUTPATIENT
Start: 2021-10-20 | End: 2021-10-25

## 2021-10-20 RX ORDER — VANCOMYCIN HYDROCHLORIDE 125 MG/1
125 CAPSULE ORAL 4 TIMES DAILY
Qty: 20 CAPSULE | Refills: 0 | Status: SHIPPED | OUTPATIENT
Start: 2021-10-20 | End: 2021-10-25

## 2021-10-20 RX ORDER — DOXYCYCLINE HYCLATE 100 MG
100 TABLET ORAL 2 TIMES DAILY
Qty: 14 TABLET | Refills: 0 | Status: SHIPPED | OUTPATIENT
Start: 2021-10-20 | End: 2021-10-27

## 2021-10-20 ASSESSMENT — ENCOUNTER SYMPTOMS
SHORTNESS OF BREATH: 1
WHEEZING: 0
CHEST TIGHTNESS: 0
RHINORRHEA: 0
ABDOMINAL PAIN: 0
CONSTIPATION: 0
VOMITING: 0
DIARRHEA: 0
PHOTOPHOBIA: 0
NAUSEA: 0
SINUS PRESSURE: 0
COUGH: 1
SORE THROAT: 0
SINUS PAIN: 0
ABDOMINAL DISTENTION: 0
BLOOD IN STOOL: 0

## 2021-10-20 NOTE — PROGRESS NOTES
Post-Discharge Transitional Care Management Services or Hospital Follow Up      Bal Arshad   YOB: 1940    Date of Office Visit:  10/20/2021  Date of Hospital Admission: 10/7/21  Date of Hospital Discharge: 10/10/21  Readmission Risk Score(high >=14%.  Medium >=10%):Readmission Risk Score: 24      Care management risk score Rising risk (score 2-5) and Complex Care (Scores >=6): 17     Non face to face  following discharge, date last encounter closed (first attempt may have been earlier): 10/11/2021  4:29 PM 10/11/2021  4:29 PM    Call initiated 2 business days of discharge: Yes     Patient Active Problem List   Diagnosis    GERD (gastroesophageal reflux disease)    Essential hypertension    Hyperlipidemia    Restless leg syndrome    Anemia of chronic disease    Postural dizziness    Moderate persistent asthma    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-sided sciatica    Morbid obesity with BMI of 40.0-44.9, adult ([address]    Type 2 diabetes mellitus with stage 4 chronic kidney disease, without long-term current use of insulin ([address]    CAD (coronary artery disease)    Parkinson's disease ([address]    Severe obstructive sleep apnea    Hypothyroidism    Hepatic steatosis    Diverticulosis    Blind left eye    L carotid artery stenosis s/p CEA    PAF (paroxysmal atrial fibrillation) (HCC)    COPD (chronic obstructive pulmonary disease) (HCC)    Diastolic congestive heart failure (HCC)    Partial symptomatic epilepsy with complex partial seizures, not intractable, without status epilepticus ([address]    Chronic kidney disease, stage 4 (severe) ([address]    Colonic pseudoobstruction    Sialadenitis    Chest pain    Pulmonary HTN ([address]    Mitral valve insufficiency    UTI (urinary tract infection)    C. difficile colitis    Dehydration    Laceration of left lower extremity    Pulmonary nodules       Allergies   Allergen Reactions    Levaquin [Levofloxacin]      Pt was given in er and developed hives and redness    Metoclopramide     Phenazopyridine     Pyridium [Phenazopyridine Hcl]     Reglan [Metoclopramide Hcl]     Reglan [Metoclopramide Hcl]      tremors       Medications listed as ordered at the time of discharge from [facility] \"Ritu\"   Home Medication Instructions JAMILA:    Printed on:10/20/21 3047   Medication Information                      albuterol sulfate  (90 Base) MCG/ACT inhaler  INHALE 2 PUFFS INTO THE LUNGS EVERY 6 HOURS AS NEEDED FOR WHEEZING             Alpha-Lipoic Acid 300 MG TABS  Take by mouth daily             apixaban (ELIQUIS) 2.5 MG TABS tablet  Take 1 tablet by mouth 2 times daily             aspirin EC 81 MG EC tablet  Take 1 tablet by mouth daily. Start taking next week.   Indications: OTC             atorvastatin (LIPITOR) 40 MG tablet  Take 1.5 tablets by mouth daily             azithromycin (ZITHROMAX) 250 MG tablet  Take 1 tablet by mouth See Admin Instructions for 5 days 500mg on day 1 followed by 250mg on days 2 - 5             BREO ELLIPTA 100-25 MCG/INH AEPB inhaler  INHALE 1 PUFF EVERY DAY             carbidopa-levodopa (SINEMET)  MG per tablet  Take 1 tablet by mouth 4 times daily             doxycycline hyclate (VIBRA-TABS) 100 MG tablet  Take 1 tablet by mouth 2 times daily for 7 days             DULoxetine (CYMBALTA) 60 MG extended release capsule  TAKE 1 CAPSULE EVERY DAY             ferrous sulfate (IRON 325) 325 (65 Fe) MG tablet  Take 325 mg by mouth 2 times daily              fluticasone (FLONASE) 50 MCG/ACT nasal spray  USE 2 PUFFS IN EACH NOSTRIL DAILY             furosemide (LASIX) 40 MG tablet  Take 0.5 tablets by mouth daily             Handicap Placard MISC  by Does not apply route DX: G20  parkinson's disease  Exp: 9/1/2024             ipratropium-albuterol (DUONEB) 0.5-2.5 (3) MG/3ML SOLN nebulizer solution  Inhale 3 mLs into the lungs every 6 hours as needed for Shortness of Breath DX: J45.40 lactobacillus (CULTURELLE) capsule  Take 1 capsule by mouth 2 times daily for 10 days             lamoTRIgine (LAMICTAL) 25 MG tablet  Take 25 mg by mouth 2 times daily 2 tabs BID             metoprolol tartrate (LOPRESSOR) 50 MG tablet  TAKE 1 TABLET TWICE DAILY             montelukast (SINGULAIR) 10 MG tablet  TAKE 1 TABLET EVERY DAY             Multiple Vitamins-Minerals (THERAPEUTIC MULTIVITAMIN-MINERALS) tablet  Take 1 tablet by mouth daily             NIFEdipine (ADALAT CC) 60 MG extended release tablet  Take 1 tablet by mouth daily             propafenone (RYTHMOL) 150 MG tablet  Take 1 tablet by mouth every 8 hours             valsartan (DIOVAN) 160 MG tablet  Take 160 mg by mouth daily             vancomycin (VANCOCIN) 125 MG capsule  Take 1 capsule by mouth 4 times daily for 5 days             vancomycin (VANCOCIN) 250 MG capsule  Take 1 capsule by mouth 4 times daily for 12 days                   Medications marked \"taking\" at this time  Outpatient Medications Marked as Taking for the 10/20/21 encounter (Office Visit) with Ritika Morales, MAHAD - CNP   Medication Sig Dispense Refill    azithromycin (ZITHROMAX) 250 MG tablet Take 1 tablet by mouth See Admin Instructions for 5 days 500mg on day 1 followed by 250mg on days 2 - 5 6 tablet 0    doxycycline hyclate (VIBRA-TABS) 100 MG tablet Take 1 tablet by mouth 2 times daily for 7 days 14 tablet 0    vancomycin (VANCOCIN) 125 MG capsule Take 1 capsule by mouth 4 times daily for 5 days 20 capsule 0    vancomycin (VANCOCIN) 250 MG capsule Take 1 capsule by mouth 4 times daily for 12 days 48 capsule 0    lactobacillus (CULTURELLE) capsule Take 1 capsule by mouth 2 times daily for 10 days 20 capsule 0    apixaban (ELIQUIS) 2.5 MG TABS tablet Take 1 tablet by mouth 2 times daily 180 tablet 3    valsartan (DIOVAN) 160 MG tablet Take 160 mg by mouth daily      metoprolol tartrate (LOPRESSOR) 50 MG tablet TAKE 1 TABLET TWICE DAILY 180 tablet 1    DULoxetine (CYMBALTA) 60 MG extended release capsule TAKE 1 CAPSULE EVERY DAY 90 capsule 1    montelukast (SINGULAIR) 10 MG tablet TAKE 1 TABLET EVERY DAY 90 tablet 1    albuterol sulfate  (90 Base) MCG/ACT inhaler INHALE 2 PUFFS INTO THE LUNGS EVERY 6 HOURS AS NEEDED FOR WHEEZING 54 g 3    BREO ELLIPTA 100-25 MCG/INH AEPB inhaler INHALE 1 PUFF EVERY DAY 1 each 3    atorvastatin (LIPITOR) 40 MG tablet Take 1.5 tablets by mouth daily 135 tablet 3    NIFEdipine (ADALAT CC) 60 MG extended release tablet Take 1 tablet by mouth daily 90 tablet 3    propafenone (RYTHMOL) 150 MG tablet Take 1 tablet by mouth every 8 hours 270 tablet 3    furosemide (LASIX) 40 MG tablet Take 0.5 tablets by mouth daily 60 tablet 5    fluticasone (FLONASE) 50 MCG/ACT nasal spray USE 2 PUFFS IN EACH NOSTRIL DAILY 3 Bottle 10    carbidopa-levodopa (SINEMET)  MG per tablet Take 1 tablet by mouth 4 times daily      Alpha-Lipoic Acid 300 MG TABS Take by mouth daily      lamoTRIgine (LAMICTAL) 25 MG tablet Take 25 mg by mouth 2 times daily 2 tabs BID      Handicap Placard MISC by Does not apply route DX: G20  parkinson's disease  Exp: 9/1/2024 1 each 0    ipratropium-albuterol (DUONEB) 0.5-2.5 (3) MG/3ML SOLN nebulizer solution Inhale 3 mLs into the lungs every 6 hours as needed for Shortness of Breath DX: J45.40 360 mL 1    Multiple Vitamins-Minerals (THERAPEUTIC MULTIVITAMIN-MINERALS) tablet Take 1 tablet by mouth daily      aspirin EC 81 MG EC tablet Take 1 tablet by mouth daily. Start taking next week. Indications: OTC 30 tablet 2        Medications patient taking as of now reconciled against medications ordered at time of hospital discharge: Yes    Chief Complaint   Patient presents with    Follow-Up from Hospital     feeling week, c.diff       HPI    Inpatient course: Discharge summary reviewed- see chart.     Interval history/Current status:     Pt discharged on 10/10/21 with oral vancomycin for c.diff, and probiotic. Diarrhea has improved, but still not having formed stools, per  but not as watery or as frequent as before. Denies abdominal pain, or cramping. Although feeling better, Pt is still feeling not like herself. Feels like energy has not returned. Appetite is still poor, but improving, Is tolerating solid food now per ; previously had only been tolerating clear liquids. Tries to drink enough water daily. Denies nausea/vomiting, just feels no desire to eat. States prior to c. Diff infection that appetite was great and that she was a \"good eater\". Still taking probiotic, and oral vancomycin, oral vancomycin will be completed in 2 days. Denies fevers, body aches, chills, lightheadedness, dizziness, sob, cp. Denies blood in stool. Stool is getting thicker in consistency. Also having c/o increase SOB over the last few days, low energy and fatigue. Mild cough. Appetite not great but improving. Denies any fevers, increased respiratory rate.  denies any new confusion. Denies cp, palpitations, syncope, lightheadedness/dizziness. Laceration to left lower extremity healing well. Denies purulent discharge, edema, redness, warmth to site, pain, numbness/tingling. Had 9 stitches placed while in ER, still intact. All 9 removed today, without issue. Site c/d/i, no s/sx of infection. Sent with Probiotic/Vancomycin  C. difficile colitis  - sec to recent abx use for UTi   - now on oral vancomycin 250 mg q6 hrly. Continued IV flagyl while in house  - started probiotics   -Continuing to improve, 2 days left of oral vanc     Left lower extremity laceration status post repair- laceration occurred in ER while transferring from wheelchair. Sutures in place. Need removal in 10 days  Wound appears healthy  -Sutures removed today, wound healing well, no s/sx of infection.     Permanent atrial fibrillation on Eliquis- continued rythmol, rate controlled with metoprolol .  Continued AC with eliquis 2.5 mg bid    Hypertension- stable      Anemia of chronic disease  - Will recheck levels today.     Parkinson's disease- resumed sinemet     CKD stage IIIb - Baseline Cr 1.8       Lung nodule - 11 mm need f.w 3 months -f.w Dr. Katie Cortez     ,Review of Systems   Constitutional: Positive for appetite change and fatigue. Negative for activity change, chills, diaphoresis, fever and unexpected weight change. HENT: Negative for congestion, postnasal drip, rhinorrhea, sinus pressure, sinus pain and sore throat. Eyes: Negative for photophobia and visual disturbance. Respiratory: Positive for cough and shortness of breath. Negative for chest tightness and wheezing. Cardiovascular: Negative for chest pain, palpitations and leg swelling. Gastrointestinal: Negative for abdominal distention, abdominal pain, blood in stool, constipation, diarrhea, nausea and vomiting. Genitourinary: Negative. Negative for decreased urine volume, difficulty urinating, dysuria, flank pain, frequency and urgency. Musculoskeletal: Negative. Negative for arthralgias and gait problem. Skin: Positive for wound (healing laceration to LLL). Neurological: Negative. Negative for dizziness, syncope, weakness, light-headedness, numbness and headaches. Psychiatric/Behavioral: Negative. Vitals:    10/20/21 0954   BP: (!) 128/90   Site: Right Lower Arm   Position: Sitting   Cuff Size: Medium Adult   Pulse: 68   SpO2: 92%     There is no height or weight on file to calculate BMI. Wt Readings from Last 3 Encounters:   10/06/21 273 lb (123.8 kg)   09/30/21 273 lb 4.8 oz (124 kg)   08/20/21 271 lb (122.9 kg)     BP Readings from Last 3 Encounters:   10/20/21 (!) 128/90   10/10/21 (!) 182/72   10/06/21 130/72       Physical Exam  Vitals reviewed. Constitutional:       General: She is not in acute distress. Appearance: Normal appearance. She is normal weight. She is not ill-appearing, toxic-appearing or diaphoretic.    HENT:      Head: Normocephalic and atraumatic. Nose: Nose normal.      Mouth/Throat:      Mouth: Mucous membranes are moist.      Pharynx: Oropharynx is clear. Eyes:      Conjunctiva/sclera: Conjunctivae normal.   Cardiovascular:      Rate and Rhythm: Normal rate. Pulses: Normal pulses. Heart sounds: Normal heart sounds. Pulmonary:      Effort: Pulmonary effort is normal. No respiratory distress. Breath sounds: No wheezing. Comments: Fine crackles heard Left lower lung fields; Abdominal:      General: Abdomen is flat. Bowel sounds are normal.      Palpations: Abdomen is soft. Tenderness: There is no abdominal tenderness. There is no guarding. Musculoskeletal:         General: Normal range of motion. Cervical back: Normal range of motion and neck supple. Skin:     General: Skin is warm and dry. Capillary Refill: Capillary refill takes less than 2 seconds. Comments: Healing left lower leg 5 cm transverse laceration to anterior left shin. sutures removed successfully, site c/d/i, no signs or symptoms of infection. No drainage. intact sensation, distal pulses equal bilaterally. No bony tenderness   Neurological:      General: No focal deficit present. Mental Status: She is alert and oriented to person, place, and time. Mental status is at baseline. Psychiatric:         Mood and Affect: Mood normal.         Behavior: Behavior normal.         Thought Content: Thought content normal.         Judgment: Judgment normal.         Assessment/Plan:  1. C. difficile colitis  - RENAL FUNCTION PANEL; Future  - CBC WITH AUTO DIFFERENTIAL; Future  -Continue oral vanc/probiotic  -Cautioned on possible return s/sx, abdominal pain, increase in diarrhea-instructed to call office if that occurs. 2. PAF (paroxysmal atrial fibrillation) ([facility])  - [place] DISCHARGE MEDS RECONCILED W/ CURRENT OUTPATIENT MED LIST  - RENAL FUNCTION PANEL; Future  -Stable-controlled. On eliquis    3.  Chronic kidney disease, stage 4 (severe) (HCC)  - [place] DISCHARGE MEDS RECONCILED W/ CURRENT OUTPATIENT MED LIST  - RENAL FUNCTION PANEL; Future  -Will re-check kidney function today    4. Laceration of left lower extremity, subsequent encounter  - [place] DISCHARGE MEDS RECONCILED W/ CURRENT OUTPATIENT MED LIST  - CBC WITH AUTO DIFFERENTIAL; Future  -Sutures removed successfully from 5 cm transverse laceration left mid shin, neurovascularly intact sensation, distal pulses equal. No bony tenderness.   -Clean dressing placed. 5. Shortness of breath  - CBC WITH AUTO DIFFERENTIAL; Future  - XR CHEST STANDARD (2 VW); Future  - BRAIN NATRIURETIC PEPTIDE (BNP); Future  -Rule out pneumonia. Risk for hospital acquired pneumonia  -fine crackles heard on Left lower lung fields during exam, pt also has c/o feeling weak, fatigue, mild sob  -Will check bnp as well for FVO  -Educated pt that if sob worsens or does not improve, or develops fevers, increased weakness, new confusion then to go to ER.  -Will be difficult to tx based on allergy to Levaquin, and current c.diff infection caused by antibiotics. 6. Anemia, unspecified type  - CBC WITH AUTO DIFFERENTIAL; Future  -Will recheck today. 7. Pneumonia of left lower lobe due to infectious organism  -Stat xray results came back positive for focal pneumonia. Pt is symptomatic, and high risk. W  -Family unaware what reaction she had to Levaquin, in chart allergic rxn is listed as hives. Due to unknown risk for anaphylaxis will treat with Z-pack, and Doxycycline.   -Will extend oral vanc for extended c. Diff coverage until completion of antibiotics. Continue probiotic  - azithromycin (ZITHROMAX) 250 MG tablet; Take 1 tablet by mouth See Admin Instructions for 5 days 500mg on day 1 followed by 250mg on days 2 - 5  Dispense: 6 tablet; Refill: 0  - doxycycline hyclate (VIBRA-TABS) 100 MG tablet; Take 1 tablet by mouth 2 times daily for 7 days  Dispense: 14 tablet;  Refill: 0   Side effects of medications reviewed, alarm signs and symptoms reviewed, present to ER or call office if experiencing new confusion, fevers, increased sob, cp.  verbalized understanding.    -Very low threshold for ER,  aware.       Medical Decision Making: high complexity

## 2021-10-22 ENCOUNTER — CARE COORDINATION (OUTPATIENT)
Dept: CASE MANAGEMENT | Age: 81
End: 2021-10-22

## 2021-10-22 NOTE — CARE COORDINATION
[facility] Transitions Follow Up Call    10/22/2021    Patient: Yamini Hall  Patient : 1940   MRN: [id number]  Reason for Admission: C Diff, LLE laceration s/p repair (sutures needs removed on Day 10 on 10/17/2021), Permanent A Fib on Eliquis, HTN, anemia of chronic disease, Parkinson's disease, CKD3b, morbid obesity, BETSY, lung nodule (3 mos f/up with Katie Rubin)  Discharge Date: 10/10/21 RARS: Readmission Risk Score: 24         Spoke with: Mr Rommel Hsu (spouse)    Started medications for new PNA dx. [facility] aware and SN to visit today. He feels well. Denies needs going into weekend. Care Transitions Subsequent and Final Call    Schedule Follow Up Appointment with PCP: Completed  Subsequent and Final Calls  Do you have any ongoing symptoms?: Yes  Onset of Patient-reported symptoms: Other  Patient-reported symptoms: Other  Interventions for patient-reported symptoms: Other  Have your medications changed?: Yes  Do you have any questions related to your medications?: No  Do you currently have any active services?: Yes  Are you currently active with any services?: Home Health  Do you have any needs or concerns that I can assist you with?: No  Identified Barriers: Impairment  Care Transitions Interventions  No Identified Needs  Other Interventions:            Follow Up  Future Appointments   Date Time Provider Katy Booker   2021  1:30 PM Hussain Virk MD [facility] [place] - DYD   2021  9:00 AM MAHAD Gilbert - CNP [facility] SLEEP [facility]   2021  9:00 AM MD Jackie Erwin [facility]   2021  9:30 AM MHC CT MAIN [facility] Rad   2021 10:30 AM [facility]       Romeo Jacobson RN

## 2021-10-28 ENCOUNTER — CARE COORDINATION (OUTPATIENT)
Dept: CASE MANAGEMENT | Age: 81
End: 2021-10-28

## 2021-10-28 PROBLEM — N39.0 UTI (URINARY TRACT INFECTION): Status: RESOLVED | Noted: 2021-09-28 | Resolved: 2021-10-28

## 2021-10-28 NOTE — CARE COORDINATION
[facility] Transitions Follow Up Call    10/28/2021    Patient: Fadia Sauer  Patient : 1940   MRN: [id number]  Reason for Admission: C Diff, LLE laceration s/p repair (sutures needs removed on Day 10 on 10/17/2021), Permanent A Fib on Eliquis, HTN, anemia of chronic disease, Parkinson's disease, CKD3b, morbid obesity, BETSY, lung nodule (3 mos f/up with Aamir Tamayo)  Discharge Date: 10/10/21 RARS: Readmission Risk Score: 24         Spoke with: Mr Nancy Reddy (patient)    Completed doxy this morning. Still taking probiotic. Still with loose thin stool 3-4 times daily. Denies fevers, chills, n/v. She is tolerating PO and eating good nutrition. [facility] still visits. She can talk better without phlegm in her throat, cough. Plans to ask at PCP OV scheduled for 11/3 if there will be follow up labwork or CXR. Denies needs today. Care Transitions Subsequent and Final Call    Schedule Follow Up Appointment with PCP: Completed  Subsequent and Final Calls  Do you have any ongoing symptoms?: Yes  Onset of Patient-reported symptoms: Other  Patient-reported symptoms: Other  Interventions for patient-reported symptoms: Other  Have your medications changed?: Yes  Do you have any questions related to your medications?: No  Do you currently have any active services?: Yes  Are you currently active with any services?: Home Health  Do you have any needs or concerns that I can assist you with?: No  Identified Barriers: Impairment  Care Transitions Interventions  No Identified Needs  Other Interventions:            Follow Up  Future Appointments   Date Time Provider Katy Booker   11/3/2021  9:00 [facility] [place] [facility]   2021  1:30 PM Mini Stringer MD [facility] [place] - DYD   2021  9:00 AM Lucky Sandhoff, APRN - CNP [facility]   2021  9:00 AM Sophia Kilgore MD [facility]   2021  9:30 AM MHC CT MAIN [facility] [place] Rad   2021 10:30 AM Nya Lockhart Waunita Peabody, MD [facility] PUL[facility]       Nazario Robertson RN

## 2021-11-03 ENCOUNTER — OFFICE VISIT (OUTPATIENT)
Dept: FAMILY MEDICINE CLINIC | Age: 81
End: 2021-11-03
Payer: MEDICARE

## 2021-11-03 VITALS — DIASTOLIC BLOOD PRESSURE: 78 MMHG | HEART RATE: 68 BPM | OXYGEN SATURATION: 92 % | SYSTOLIC BLOOD PRESSURE: 118 MMHG

## 2021-11-03 DIAGNOSIS — E11.22 TYPE 2 DIABETES MELLITUS WITH STAGE 4 CHRONIC KIDNEY DISEASE, WITHOUT LONG-TERM CURRENT USE OF INSULIN (HCC): ICD-10-CM

## 2021-11-03 DIAGNOSIS — I10 ESSENTIAL HYPERTENSION: ICD-10-CM

## 2021-11-03 DIAGNOSIS — I50.32 CHRONIC DIASTOLIC CONGESTIVE HEART FAILURE (HCC): ICD-10-CM

## 2021-11-03 DIAGNOSIS — R32 INCONTINENCE ASSOCIATED DERMATITIS: ICD-10-CM

## 2021-11-03 DIAGNOSIS — L25.8 INCONTINENCE ASSOCIATED DERMATITIS: ICD-10-CM

## 2021-11-03 DIAGNOSIS — N18.4 TYPE 2 DIABETES MELLITUS WITH STAGE 4 CHRONIC KIDNEY DISEASE, WITHOUT LONG-TERM CURRENT USE OF INSULIN (HCC): ICD-10-CM

## 2021-11-03 DIAGNOSIS — I48.0 PAF (PAROXYSMAL ATRIAL FIBRILLATION) (HCC): ICD-10-CM

## 2021-11-03 DIAGNOSIS — D64.9 ANEMIA, UNSPECIFIED TYPE: Primary | ICD-10-CM

## 2021-11-03 DIAGNOSIS — D64.9 ANEMIA, UNSPECIFIED TYPE: ICD-10-CM

## 2021-11-03 LAB
ANION GAP SERPL CALCULATED.3IONS-SCNC: 14 MMOL/L (ref 3–16)
BUN BLDV-MCNC: 21 MG/DL (ref 7–20)
CALCIUM SERPL-MCNC: 9.5 MG/DL (ref 8.3–10.6)
CHLORIDE BLD-SCNC: 99 MMOL/L (ref 99–110)
CO2: 29 MMOL/L (ref 21–32)
CREAT SERPL-MCNC: 2 MG/DL (ref 0.6–1.2)
GFR AFRICAN AMERICAN: 29
GFR NON-AFRICAN AMERICAN: 24
GLUCOSE BLD-MCNC: 101 MG/DL (ref 70–99)
HCT VFR BLD CALC: 31.8 % (ref 36–48)
HEMOGLOBIN: 10.1 G/DL (ref 12–16)
MCH RBC QN AUTO: 27.9 PG (ref 26–34)
MCHC RBC AUTO-ENTMCNC: 31.6 G/DL (ref 31–36)
MCV RBC AUTO: 88.3 FL (ref 80–100)
PDW BLD-RTO: 15.5 % (ref 12.4–15.4)
PLATELET # BLD: 395 K/UL (ref 135–450)
PMV BLD AUTO: 8.3 FL (ref 5–10.5)
POTASSIUM SERPL-SCNC: 4.7 MMOL/L (ref 3.5–5.1)
RBC # BLD: 3.6 M/UL (ref 4–5.2)
SODIUM BLD-SCNC: 142 MMOL/L (ref 136–145)
WBC # BLD: 10.1 K/UL (ref 4–11)

## 2021-11-03 PROCEDURE — 99213 OFFICE O/P EST LOW 20 MIN: CPT | Performed by: NURSE PRACTITIONER

## 2021-11-03 RX ORDER — NIFEDIPINE 60 MG/1
TABLET, FILM COATED, EXTENDED RELEASE ORAL
Qty: 90 TABLET | Refills: 3 | Status: SHIPPED | OUTPATIENT
Start: 2021-11-03 | End: 2021-12-02 | Stop reason: ALTCHOICE

## 2021-11-03 RX ORDER — PROPAFENONE HYDROCHLORIDE 150 MG/1
150 TABLET, FILM COATED ORAL EVERY 8 HOURS SCHEDULED
Qty: 270 TABLET | Refills: 3 | Status: SHIPPED | OUTPATIENT
Start: 2021-11-03

## 2021-11-03 RX ORDER — METOPROLOL TARTRATE 50 MG/1
TABLET, FILM COATED ORAL
Qty: 180 TABLET | Refills: 1 | Status: SHIPPED | OUTPATIENT
Start: 2021-11-03 | End: 2022-03-02

## 2021-11-03 RX ORDER — MONTELUKAST SODIUM 10 MG/1
TABLET ORAL
Qty: 90 TABLET | Refills: 1 | Status: SHIPPED | OUTPATIENT
Start: 2021-11-03 | End: 2022-05-26

## 2021-11-03 NOTE — PROGRESS NOTES
2021  Lina Marte (: 1940)  80 y.o.    ASSESSMENT and PLAN:  Susan Kearns was seen today for discuss labs. Diagnoses and all orders for this visit:    Anemia, unspecified type  -     CBC; Future  -     BASIC METABOLIC PANEL; Future  -Recheck blood counts. -Denies blood in stool  -Stable, continue to monitor. Type 2 diabetes mellitus with stage 4 chronic kidney disease, without long-term current use of insulin ([facility] [address]  -Stable, continue current regimen.   -Last A1c was 21, due 2021    Essential hypertension  -     CBC; Future  -     BASIC METABOLIC PANEL; Future  -Recheck renal function and blood counts  -Stable, continue current regimen. A.fib  -stable, continue current regimen.   -On eliquis. Moisture associated Dermatitis  -     zinc oxide 16 % OINT ointment; Apply topically 4 times daily as needed for Dry Skin  -Use protective barrier cream with diaper changes, change diaper more frequently. Add rest periods where pt takes break from diaper to promote moisture reduction.  -Moisture-associated skin damage-small superficial open area to right buttock. Blanchable redness. Mild pain. Monitor daily for further breakdown.   -Educated on frequent re-positioning to reduce risk of pressure wounds developing.   -Skin care of the diaper area -- Increasing the frequency of diaper changing and skin cleansing limits prolonged skin contact with stool and urine. If possible, allowed periods of rest without a diaper (eg, a few hours per day), allowing the skin to be exposed directly to air. Avoid harsh soaps, use fragrant free mild soaps. If incontinent, foam barrier would be ineffective. Recommend repositioning every 2 hrs, rotate using pillow support to reduce pressure. Vigorous washing and rubbing of skin should also be avoided. -Reviewed signs and symptoms of worsening skin breakdown. Has [facility] visits currently, with nurse checks. Return if symptoms worsen or fail to improve.  Has f.u with Dr. Skye Negro at end of Nov.    HPI   Presenting for f/u s/p pneumonia, left leg laceration, and C. Diff infection all in the month of October. Overall feeling better. Ana Stanley the past two days, falls were mechanical-Tripped over bsc at home. Still having mild diarrhea <3 times per day. Denies cp, palpitations, lightheadedness/dizziness. Breathing has improved. SOB improved. Still wearing o2 at night 2L, which is her baseline. Appetite is continuing to improve. Left lower extremity laceration where stitches were removed at last visit, still is looking good, no pain, s/sx of infection. Denies any new confusion, fevers, body aches, chills, weakness. Has Home Health care currently . Has c/o Moisture-associated skin damage-small superficial open area to right buttock. Blanchable redness to surrounding skin. Mild pain to area. High risk for further breakdown. Pneumonia-Resolved   -Completed tx for HAP, limited options due to c.diff, allergies,  and pmhx. extended po vanc until end of antibiotic tx. C. difficile colitis-resolved, diarrhea continues to improve; Completed oral vancomycin, had extended tx due to  antibiotics given for pne. Permanent atrial fibrillation on Eliquis- continued rythmol, rate controlled with metoprolol . Continued AC with eliquis 2.5 mg bid      Hypertension- stable      Anemia of chronic disease  - Will recheck levels today.     Parkinson's disease- resumed sinemet     CKD stage IIIb - Baseline Cr 1.8; Mild increase in CR, will monitor closely.                  Lung nodule - 11 mm need f.w 3 months -f.w Dr. Aaliyah Giron    Review of Systems    Allergies, past medical history, family history, and social history reviewed and unchanged from previous encounter.      Current Outpatient Medications   Medication Sig Dispense Refill    apixaban (ELIQUIS) 2.5 MG TABS tablet Take 1 tablet by mouth 2 times daily 180 tablet 3    valsartan (DIOVAN) 160 MG tablet Take 160 mg by mouth daily      metoprolol tartrate (LOPRESSOR) 50 MG tablet TAKE 1 TABLET TWICE DAILY 180 tablet 1    DULoxetine (CYMBALTA) 60 MG extended release capsule TAKE 1 CAPSULE EVERY DAY 90 capsule 1    montelukast (SINGULAIR) 10 MG tablet TAKE 1 TABLET EVERY DAY 90 tablet 1    albuterol sulfate  (90 Base) MCG/ACT inhaler INHALE 2 PUFFS INTO THE LUNGS EVERY 6 HOURS AS NEEDED FOR WHEEZING 54 g 3    BREO ELLIPTA 100-25 MCG/INH AEPB inhaler INHALE 1 PUFF EVERY DAY 1 each 3    ferrous sulfate (IRON 325) 325 (65 Fe) MG tablet Take 325 mg by mouth 2 times daily       atorvastatin (LIPITOR) 40 MG tablet Take 1.5 tablets by mouth daily 135 tablet 3    NIFEdipine (ADALAT CC) 60 MG extended release tablet Take 1 tablet by mouth daily 90 tablet 3    propafenone (RYTHMOL) 150 MG tablet Take 1 tablet by mouth every 8 hours 270 tablet 3    furosemide (LASIX) 40 MG tablet Take 0.5 tablets by mouth daily 60 tablet 5    fluticasone (FLONASE) 50 MCG/ACT nasal spray USE 2 PUFFS IN EACH NOSTRIL DAILY 3 Bottle 10    carbidopa-levodopa (SINEMET)  MG per tablet Take 1 tablet by mouth 4 times daily      Alpha-Lipoic Acid 300 MG TABS Take by mouth daily      lamoTRIgine (LAMICTAL) 25 MG tablet Take 25 mg by mouth 2 times daily 2 tabs BID      Handicap Placard MISC by Does not apply route DX: G20  parkinson's disease  Exp: 9/1/2024 1 each 0    ipratropium-albuterol (DUONEB) 0.5-2.5 (3) MG/3ML SOLN nebulizer solution Inhale 3 mLs into the lungs every 6 hours as needed for Shortness of Breath DX: J45.40 360 mL 1    Multiple Vitamins-Minerals (THERAPEUTIC MULTIVITAMIN-MINERALS) tablet Take 1 tablet by mouth daily      aspirin EC 81 MG EC tablet Take 1 tablet by mouth daily. Start taking next week. Indications: OTC 30 tablet 2     No current facility-administered medications for this visit.        Vitals:    11/03/21 0900   BP: 118/78   Site: Right Lower Arm   Position: Sitting   Cuff Size: Medium Adult   Pulse: 68   SpO2: 92%

## 2021-11-04 ENCOUNTER — CARE COORDINATION (OUTPATIENT)
Dept: CASE MANAGEMENT | Age: 81
End: 2021-11-04

## 2021-11-04 RX ORDER — ATORVASTATIN CALCIUM 40 MG/1
TABLET, FILM COATED ORAL
Qty: 135 TABLET | Refills: 3 | Status: SHIPPED | OUTPATIENT
Start: 2021-11-04

## 2021-11-04 NOTE — TELEPHONE ENCOUNTER
Alexis English is actually the ACM for [facility] now. I've copied her in this response to let her know about the patient. Thanks so much!      Pritesh Romero

## 2021-11-04 NOTE — CARE COORDINATION
[facility] Transitions Follow Up Call    2021    Patient: Gaviota Layton  Patient : 1940   MRN: [id number]  Reason for Admission: C Diff, LLE laceration s/p repair (sutures removed), Permanent A Fib on Eliquis, HTN, anemia of chronic disease, Parkinson's disease, CKD3b, morbid obesity, BETSY, lung nodule (3 mos f/up with Miguel Castro)  Discharge Date: 10/10/21 RARS: Readmission Risk Score: 24       Unable to reach patient by phone at this time. Message left including CTN contact info for return call.      Follow Up  Future Appointments   Date Time Provider Katy Booker   2021  1:30 PM Kathrin Bruno MD [facility] [place] - DYD   2021  9:00 AM MAHAD Robin - CNP [facility]   2021  9:00 AM MD Jennifer Gray [facility]   2021  9:30 AM [facility] MAIN [facility] CT [facility]   2021 10:30 AM MD Della Pennington [facility]       Grace Bella, RN

## 2021-11-05 ENCOUNTER — CARE COORDINATION (OUTPATIENT)
Dept: CARE COORDINATION | Age: 81
End: 2021-11-05

## 2021-11-05 DIAGNOSIS — N17.9 AKI (ACUTE KIDNEY INJURY) (HCC): Primary | ICD-10-CM

## 2021-11-05 SDOH — ECONOMIC STABILITY: INCOME INSECURITY: IN THE LAST 12 MONTHS, WAS THERE A TIME WHEN YOU WERE NOT ABLE TO PAY THE MORTGAGE OR RENT ON TIME?: NO

## 2021-11-05 SDOH — ECONOMIC STABILITY: HOUSING INSECURITY
IN THE LAST 12 MONTHS, WAS THERE A TIME WHEN YOU DID NOT HAVE A STEADY PLACE TO SLEEP OR SLEPT IN A SHELTER (INCLUDING NOW)?: NO

## 2021-11-05 SDOH — ECONOMIC STABILITY: HOUSING INSECURITY: IN THE LAST 12 MONTHS, HOW MANY PLACES HAVE YOU LIVED?: 1

## 2021-11-05 ASSESSMENT — LIFESTYLE VARIABLES: HOW OFTEN DO YOU HAVE A DRINK CONTAINING ALCOHOL: NEVER

## 2021-11-05 NOTE — CARE COORDINATION
Ambulatory Care Coordination Note  11/5/2021  CM Risk Score: 17  Charlson 10 Year Mortality Risk Score: 100%     ACC: Temdavid Officer, RN    Summary Note: ACM outreach to patient who completed with transitional care nurse. [facility] offered care coordination and patient  agreed to enroll patient. Rich Menard said that patient is doing well and has completed all oral vancomycin for C. Diff infection. Rich Menard said patient stools are almost back to normal. Rich Menard says he weighs patient everyday and weights have been stable. Patient said COPD, DM, and CHF are stable at this time. ACM will call next week to complete enrollment into care coordination. Plan:    F/U call 1 week  Complete enrollment (WILDRE SABA)    Ambulatory Care Coordination Assessment    Care Coordination Protocol  Referral from Primary Care Provider: No  Week 1 - Initial Assessment     Do you have all of your prescriptions and are they filled?: Yes (Comment: Reviewed CJT 11/5/21 )  Barriers to medication adherence: None  Are you able to afford your medications?: Yes  How often do you have trouble taking your medications the way you have been told to take them?: I always take them as prescribed. Do you have Home O2 Therapy?: Yes   Oxygen Regimen:  At night/Sleep Flow - Enter rate/FIO2: 2   Method of Delivery: Concentrater   CPAP Use: BiPAP      Ability to seek help/take action for Emergent Urgent situations i.e. fire, crime, inclement weather or health crisis.: Needs Assistance  Ability to ambulate to restroom: Needs Assistance  Ability handle personal hygeine needs (bathing/dressing/grooming): Needs Assistance  Ability to manage Medications: Needs Assistance  Ability to prepare Food Preparation: Dependent  Ability to maintain home (clean home, laundry): Dependent  Ability to drive and/or has transportation: Dependent  Ability to do shopping: Needs Assistance  Ability to manage finances: Dependent  Is patient able to live independently?: No Current Housing: Private Residence           Frequent urination at night?: Yes  Do you use rails/bars?: Yes  Do you have a non-slip tub mat?: Yes     Are you experiencing loss of meaning?: No (Comment: Reviewed CJT 11/5/21 )  Are you experiencing loss of hope and peace?: No (Comment: Reviewed CJT 11/5/21 )     Suggested Interventions and Community Resources                  Prior to Admission medications    Medication Sig Start Date End Date Taking?  Authorizing Provider   atorvastatin (LIPITOR) 40 MG tablet TAKE 1 AND 1/2 TABLETS EVERY DAY 11/4/21   Lukas Hull MD   zinc oxide 16 % OINT ointment Apply topically 4 times daily as needed for Dry Skin 11/3/21   MAHAD Zamarripa CNP   NIFEdipine (ADALAT CC) 60 MG extended release tablet TAKE 1 TABLET EVERY DAY 11/3/21   Luisa Escalante MD   montelukast (SINGULAIR) 10 MG tablet TAKE 1 TABLET EVERY DAY 11/3/21   MAHAD Lopez CNP   propafenone (RYTHMOL) 150 MG tablet TAKE 1 TABLET BY MOUTH EVERY 8 HOURS 11/3/21   Luisa Escalante MD   metoprolol tartrate (LOPRESSOR) 50 MG tablet TAKE 1 TABLET TWICE DAILY 11/3/21   MAHAD Lopez CNP   apixaban (ELIQUIS) 2.5 MG TABS tablet Take 1 tablet by mouth 2 times daily 9/13/21   Lukas Hull MD   valsartan (DIOVAN) 160 MG tablet Take 160 mg by mouth daily    Historical Provider, MD   DULoxetine (CYMBALTA) 60 MG extended release capsule TAKE 1 CAPSULE EVERY DAY 7/14/21   MAHAD Lopez CNP   albuterol sulfate  (90 Base) MCG/ACT inhaler INHALE 2 PUFFS INTO THE LUNGS EVERY 6 HOURS AS NEEDED FOR WHEEZING 7/14/21   MAHAD Lopez CNP   BREO ELLIPTA 100-25 MCG/INH AEPB inhaler INHALE 1 PUFF EVERY DAY 7/14/21   MAHAD Lopez CNP   ferrous sulfate (IRON 325) 325 (65 Fe) MG tablet Take 325 mg by mouth 2 times daily  2/4/21 5/5/21  Historical Provider, MD   furosemide (LASIX) 40 MG tablet Take 0.5 tablets by mouth daily 1/11/21   Luisa Escalante MD   fluticasone [facility]) 50 MCG/ACT nasal spray USE 2 PUFFS IN EACH NOSTRIL DAILY 1/8/21   Whitney Members, PA   carbidopa-levodopa (SINEMET)  MG per tablet Take 1 tablet by mouth 4 times daily    Historical Provider, MD   Alpha-Lipoic Acid 300 MG TABS Take by mouth daily    Historical Provider, MD   lamoTRIgine (LAMICTAL) 25 MG tablet Take 25 mg by mouth 2 times daily 2 tabs BID    Historical Provider, MD   Handicap Placard MISC by Does not apply route DX: [place] Forde  parkinson's disease  Exp: 9/1/2024 9/27/19   Whitney Members, PA   ipratropium-albuterol (DUONEB) 0.5-2.5 (3) MG/3ML SOLN nebulizer solution Inhale 3 mLs into the lungs every 6 hours as needed for Shortness of Breath DX: J45.40 2/6/19   Whitney Members, PA   Multiple Vitamins-Minerals (THERAPEUTIC MULTIVITAMIN-MINERALS) tablet Take 1 tablet by mouth daily    Historical Provider, MD   solifenacin (VESICARE) 5 MG tablet Take 1 tablet by mouth daily. 6/25/13 10/29/13  Andreina Osullivan MD   aspirin EC 81 MG EC tablet Take 1 tablet by mouth daily. Start taking next week. Indications: OTC 2/25/13   Devon Leon MD       Future Appointments   Date Time Provider Katy Booker   11/29/2021  1:30 PM [facility] [place] [facility]   11/30/2021  9:00 AM MAHAD Ruth - CNP [facility]   12/2/2021  9:00 AM MD Lian Burciaga [facility]   12/21/2021  9:30 [facility] [place] [facility]   12/28/2021 10:30 AM [facility]     ,   Diabetes Assessment    Medic Alert ID: No  Meal Planning: Avoidance of concentrated sweets   How often do you test your blood sugar?: Daily (Comment: -125)   Do you have barriers with adherence to non-pharmacologic self-management interventions?  (Nutrition/Exercise/Self-Monitoring): No   Have you ever had to go to the ED for symptoms of low blood sugar?: No       No patient-reported symptoms      ,   Congestive Heart Failure Assessment    Are you currently restricting fluids?:  (Comment: 64 oz per

## 2021-11-08 DIAGNOSIS — N17.9 AKI (ACUTE KIDNEY INJURY) (HCC): ICD-10-CM

## 2021-11-08 RX ORDER — DULOXETIN HYDROCHLORIDE 60 MG/1
CAPSULE, DELAYED RELEASE ORAL
Qty: 90 CAPSULE | Refills: 1 | Status: SHIPPED | OUTPATIENT
Start: 2021-11-08 | End: 2022-03-02

## 2021-11-09 LAB
ANION GAP SERPL CALCULATED.3IONS-SCNC: 18 MMOL/L (ref 3–16)
BUN BLDV-MCNC: 28 MG/DL (ref 7–20)
CALCIUM SERPL-MCNC: 9.5 MG/DL (ref 8.3–10.6)
CHLORIDE BLD-SCNC: 96 MMOL/L (ref 99–110)
CO2: 27 MMOL/L (ref 21–32)
CREAT SERPL-MCNC: 2.1 MG/DL (ref 0.6–1.2)
GFR AFRICAN AMERICAN: 27
GFR NON-AFRICAN AMERICAN: 23
GLUCOSE BLD-MCNC: 107 MG/DL (ref 70–99)
POTASSIUM SERPL-SCNC: 4.6 MMOL/L (ref 3.5–5.1)
SODIUM BLD-SCNC: 141 MMOL/L (ref 136–145)

## 2021-11-11 ENCOUNTER — CARE COORDINATION (OUTPATIENT)
Dept: CARE COORDINATION | Age: 81
End: 2021-11-11

## 2021-11-11 DIAGNOSIS — N17.9 AKI (ACUTE KIDNEY INJURY) (HCC): Primary | ICD-10-CM

## 2021-11-11 NOTE — CARE COORDINATION
Ambulatory Care Coordination Note  11/11/2021  CM Risk Score: 17  Charlson 10 Year Mortality Risk Score: 100%     ACC: Vikki Hassan RN    Summary Note: ACM completed follow up call with patient. Patient was in good spirits. Patient has been participating in therapy and has been approved for 4 more weeks of skilled nursing therapy. Patient said she has fallen at home but without injury. Lesli Contreras said he had assisted patient down on both occasions. The only active complaint was still having loose stools from the C.diff infection patient had but was treated for. ACM reviewed upcoming appts with patient. No further questions at this time. Plan:    F/U call 3 weeks  Update DM assessment      Care Coordination Interventions    Program Enrollment: Complex Care  Referral from Primary Care Provider: No  Suggested Interventions and [address]: In Process (Comment: SKilled)  Occupational Therapy: In Process  Physical Therapy: In Process         Goals Addressed                 This Visit's Progress     Reduce Falls    No change     I will reduce my risk of falls by the following: Use walking aids like cane or walker    Barriers: overwhelmed by complexity of regimen  Plan for overcoming my barriers: Patient will use motorized wheelchair and lift equipment   Confidence: 8/10  Anticipated Goal Completion Date: 1/5/21            Prior to Admission medications    Medication Sig Start Date End Date Taking?  Authorizing Provider   DULoxetine (CYMBALTA) 60 MG extended release capsule TAKE 1 CAPSULE EVERY DAY 11/8/21   MAHAD Sharma CNP   atorvastatin (LIPITOR) 40 MG tablet TAKE 1 AND 1/2 TABLETS EVERY DAY 11/4/21   Gustabo Melendez MD   zinc oxide 16 % OINT ointment Apply topically 4 times daily as needed for Dry Skin 11/3/21   MAHAD Sharma CNP   NIFEdipine (ADALAT CC) 60 MG extended release tablet TAKE 1 TABLET EVERY DAY 11/3/21   Luke Lao MD montelukast (SINGULAIR) 10 MG tablet TAKE 1 TABLET EVERY DAY 11/3/21   MAHAD Diaz CNP   propafenone (RYTHMOL) 150 MG tablet TAKE 1 TABLET BY MOUTH EVERY 8 HOURS 11/3/21   Allyson Self MD   metoprolol tartrate (LOPRESSOR) 50 MG tablet TAKE 1 TABLET TWICE DAILY 11/3/21   MAHAD Diaz CNP   apixaban (ELIQUIS) 2.5 MG TABS tablet Take 1 tablet by mouth 2 times daily 9/13/21   Boni Toth MD   valsartan (DIOVAN) 160 MG tablet Take 160 mg by mouth daily    Historical Provider, MD   albuterol sulfate  (90 Base) MCG/ACT inhaler INHALE 2 PUFFS INTO THE LUNGS EVERY 6 HOURS AS NEEDED FOR WHEEZING 7/14/21   MAHAD Diaz CNP   BREO ELLIPTA 100-25 MCG/INH AEPB inhaler INHALE 1 PUFF EVERY DAY 7/14/21   MAHAD Diaz CNP   ferrous sulfate (IRON 325) 325 (65 Fe) MG tablet Take 325 mg by mouth 2 times daily  2/4/21 5/5/21  Historical Provider, MD   furosemide (LASIX) 40 MG tablet Take 0.5 tablets by mouth daily 1/11/21   Allyson Self MD   fluticasone [facility]) 50 MCG/ACT nasal spray USE 2 PUFFS IN [facility] NOSTRIL DAILY 1/8/21   BIJAN Ye   carbidopa-levodopa (SINEMET)  MG per tablet Take 1 tablet by mouth 4 times daily    Historical Provider, MD   Alpha-Lipoic Acid 300 MG TABS Take by mouth daily    Historical Provider, MD   lamoTRIgine (LAMICTAL) 25 MG tablet Take 25 mg by mouth 2 times daily 2 tabs BID    Historical Provider, MD   Handicap Placard MISC by Does not apply route DX: Ambar Tabares  parkinson's disease  Exp: 9/1/2024 9/27/19   BIJAN Ye   ipratropium-albuterol (DUONEB) 0.5-2.5 (3) MG/3ML SOLN nebulizer solution Inhale 3 mLs into the lungs every 6 hours as needed for Shortness of Breath DX: J45.40 2/6/19   Colette Concord, PA   Multiple Vitamins-Minerals (THERAPEUTIC MULTIVITAMIN-MINERALS) tablet Take 1 tablet by mouth daily    Historical Provider, MD   solifenacin (VESICARE) 5 MG tablet Take 1 tablet by mouth daily.  6/25/13 10/29/13  [facility], MD   aspirin EC 81 MG EC tablet Take 1 tablet by mouth daily. Start taking next week. Indications: OTC 13   Julián Masters MD       Future Appointments   Date Time Provider Katy Bookre   2021  1:30 PM MD [facility] [place] - DYD   2021  9:00 AM Dylan Sommer, APRN - CNP [facility]   2021  9:00 AM MD Brandi Zee [facility]   2021  9:30 AM [facility] CT MAIN [facility] [place] [facility]   2021 10:30 AM Dulce Leon MD [facility]     ,   Diabetes Assessment    Medic Alert ID: No  Meal Planning: Avoidance of concentrated sweets   How often do you test your blood sugar?: Daily (Comment: -125)   Do you have barriers with adherence to non-pharmacologic self-management interventions? (Nutrition/Exercise/Self-Monitoring): No   Have you ever had to go to the ED for symptoms of low blood sugar?: No          ,   Congestive Heart Failure Assessment    Are you currently restricting fluids?:  (Comment: 64 oz per day)  Do you understand a low sodium diet?: Yes  Do you understand how to read food labels?: Yes  How many restaurant meals do you eat per week?: 0  Do you salt your food before tasting it?: No         Symptoms:     Symptom course: stable  Patient-reported weight (lb): 261.4  Weight trend: stable      and   COPD Assessment    Does the patient understand envrionmental exposure?: Yes  Is the patient able to verbalize Rescue vs. Long Acting medications?: Yes  Does the patient have a nebulizer?: Yes  Does the patient use a space with inhaled medications?: Yes     No patient-reported symptoms         Symptoms:  COPD associated wheezing: Pos      Breathlessness: none  Increase use of rapid acting/rescue inhaled medications?: No  Change in chronic cough?: No/At Baseline  Change in sputum?: No/At Baseline  Sputum characteristics:  Thick, White  Self Monitoring - SaO2: Yes  Baseline SaO2 Readin

## 2021-11-12 DIAGNOSIS — N17.9 AKI (ACUTE KIDNEY INJURY) (HCC): ICD-10-CM

## 2021-11-12 LAB
ALBUMIN SERPL-MCNC: 4.1 G/DL (ref 3.4–5)
ANION GAP SERPL CALCULATED.3IONS-SCNC: 13 MMOL/L (ref 3–16)
BUN BLDV-MCNC: 28 MG/DL (ref 7–20)
CALCIUM SERPL-MCNC: 10 MG/DL (ref 8.3–10.6)
CHLORIDE BLD-SCNC: 98 MMOL/L (ref 99–110)
CO2: 29 MMOL/L (ref 21–32)
CREAT SERPL-MCNC: 1.9 MG/DL (ref 0.6–1.2)
GFR AFRICAN AMERICAN: 31
GFR NON-AFRICAN AMERICAN: 25
GLUCOSE BLD-MCNC: 105 MG/DL (ref 70–99)
PHOSPHORUS: 4.9 MG/DL (ref 2.5–4.9)
POTASSIUM SERPL-SCNC: 4.5 MMOL/L (ref 3.5–5.1)
SODIUM BLD-SCNC: 140 MMOL/L (ref 136–145)

## 2021-11-15 DIAGNOSIS — N17.9 AKI (ACUTE KIDNEY INJURY) (HCC): ICD-10-CM

## 2021-11-15 LAB
EPITHELIAL CELLS, UA: 4 /HPF (ref 0–5)
HYALINE CASTS: 6 /LPF (ref 0–8)
RBC UA: 3 /HPF (ref 0–4)
URINE TYPE: NORMAL
WBC UA: 2 /HPF (ref 0–5)

## 2021-11-18 ENCOUNTER — TELEPHONE (OUTPATIENT)
Dept: FAMILY MEDICINE CLINIC | Age: 81
End: 2021-11-18

## 2021-11-18 NOTE — TELEPHONE ENCOUNTER
----- Message from Jurgen Carter sent at 11/17/2021  3:39 PM EST -----  Subject: Message to Provider    QUESTIONS  Information for Provider? they are requesting that Dr. Doe Green get on to   the portal, and sign for the patient.  ---------------------------------------------------------------------------  --------------  [address]  What is the best way for the office to contact you? OK to leave message on   voicemail  Preferred Call Back Phone Number? [phone]  ---------------------------------------------------------------------------  --------------  SCRIPT ANSWERS  Relationship to Patient? Third Party  Representative Name?  [address] [facility]

## 2021-11-23 ENCOUNTER — CARE COORDINATION (OUTPATIENT)
Dept: CARE COORDINATION | Age: 81
End: 2021-11-23

## 2021-11-23 NOTE — CARE COORDINATION
Ambulatory Care Coordination Note  11/23/2021  CM Risk Score: 17  Charlson 10 Year Mortality Risk Score: 100%     ACC: Laura Daily. Tatiana Esquivel, RN    Summary Note: Spoke with patient and her spouse Alvaro Garza over the phone. Patient said that she does not use MyChart but her  does. So she put him on the telephone. Reviewed all managing CHF through Thanksgiving and Mike Holidays information provided to patient on MyChart. Alvaro Garza was able to confirm that he follows all of the information provided. He performs the meal preparation and understands a low sodium diet. He said that his spouse experiences ANN and wears 2 liters of home oxygen at night. She does not currently have any peripheral edema. She weighs herself daily and recently has never been over one pound from the day before. She takes all of her medications as prescribed and rarely misses a dose. She is currently receiving home health care for the past 2 months after her recent fall. She tries to balance her activity daily and is limited with mobility due to SOB. He says sometimes whenever they travel in car she experiences peripheral edema. She just comes home and elevates her LEs. Then the edema goes down. Patient and his spouse did not have any further questions or concerns. Informed them that [place] would continue to follow patient. Patient has Taryn's phone number also. Plan  Hand off to [address] [place] [address] GORDON FlanneryN, [address] [place] [address] Primary Care     [phone]          Care Coordination Interventions    Program Enrollment: Complex Care  Referral from Primary Care Provider: No  Suggested Interventions and [address] [place] [address] Hwy: In Process (Comment: SKilled)  Occupational Therapy: In Process  Physical Therapy: In Process         Goals Addressed    None         Prior to Admission medications    Medication Sig Start Date End Date Taking? Authorizing Provider   DULoxetine (CYMBALTA) 60 MG extended release capsule TAKE 1 CAPSULE EVERY DAY 11/8/21   MAHAD Pierce CNP   atorvastatin (LIPITOR) 40 MG tablet TAKE 1 AND 1/2 TABLETS EVERY DAY 11/4/21   Vaishali Salvador MD   zinc oxide 16 % OINT ointment Apply topically 4 times daily as needed for Dry Skin 11/3/21   MAHAD Pierce CNP   NIFEdipine (ADALAT CC) 60 MG extended release tablet TAKE 1 TABLET EVERY DAY 11/3/21   Magnus Mejia MD   montelukast (SINGULAIR) 10 MG tablet TAKE 1 TABLET EVERY DAY 11/3/21   MAHAD Dong CNP   propafenone (RYTHMOL) 150 MG tablet TAKE 1 TABLET BY MOUTH EVERY 8 HOURS 11/3/21   Magnus Mejia MD   metoprolol tartrate (LOPRESSOR) 50 MG tablet TAKE 1 TABLET TWICE DAILY 11/3/21   MAHAD Dong CNP   apixaban (ELIQUIS) 2.5 MG TABS tablet Take 1 tablet by mouth 2 times daily 9/13/21   aVishali Salvador MD   valsartan (DIOVAN) 160 MG tablet Take 160 mg by mouth daily    Historical Provider, MD   albuterol sulfate  (90 Base) MCG/ACT inhaler INHALE 2 PUFFS INTO THE LUNGS EVERY 6 HOURS AS NEEDED FOR WHEEZING 7/14/21   MAHAD Dong CNP   BREO ELLIPTA 100-25 MCG/INH AEPB inhaler INHALE 1 PUFF EVERY DAY 7/14/21   MAHAD Dong CNP   ferrous sulfate (IRON 325) 325 (65 Fe) MG tablet Take 325 mg by mouth 2 times daily  2/4/21 5/5/21  Historical Provider, MD   furosemide (LASIX) 40 MG tablet Take 0.5 tablets by mouth daily 1/11/21   Magnus Mejia MD   fluticasone [facility]) 50 MCG/ACT nasal spray USE 2 PUFFS IN [facility] NOSTRIL DAILY 1/8/21   BIJAN Parikh   carbidopa-levodopa (SINEMET)  MG per tablet Take 1 tablet by mouth 4 times daily    Historical Provider, MD   Alpha-Lipoic Acid 300 MG TABS Take by mouth daily    Historical Provider, MD   lamoTRIgine (LAMICTAL) 25 MG tablet Take 25 mg by mouth 2 times daily 2 tabs BID    Historical Provider, MD   Handicap Placard MISC by Does not apply route DX: William Mir parkinson's disease  Exp: 9/1/2024 9/27/19   BIJAN Brady   ipratropium-albuterol (DUONEB) 0.5-2.5 (3) MG/3ML SOLN nebulizer solution Inhale 3 mLs into the lungs every 6 hours as needed for Shortness of Breath DX: J45.40 2/6/19   BIJAN Brady   Multiple Vitamins-Minerals (THERAPEUTIC MULTIVITAMIN-MINERALS) tablet Take 1 tablet by mouth daily    Historical Provider, MD   solifenacin (VESICARE) 5 MG tablet Take 1 tablet by mouth daily. 6/25/13 10/29/13  Puaj Celestin MD   aspirin EC 81 MG EC tablet Take 1 tablet by mouth daily. Start taking next week.   Indications: OTC 2/25/13   Estela Balbuena MD       Future Appointments   Date Time Provider Katy Booker   11/29/2021  1:30 PM Puja Celestin MD [facility] [place] - DYD   11/30/2021  9:00 AM Jesus Archer APRN - CNP [facility]   12/2/2021  9:00 AM MD Jeffy Jefferson [facility]   12/21/2021  9:30 AM MHC CT MAIN [facility]   12/22/2021 10:00 AM [facility] MAMMO RM 1 [facility]OM[facility] Shilpi Ovens   12/28/2021 10:30 AM Keenan Mendez MD CLERM PUL [facility]      and   Congestive Heart Failure Assessment    Are you currently restricting fluids?:  (Comment: 64 oz per day)  Do you understand a low sodium diet?: Yes  Do you understand how to read food labels?: Yes  How many restaurant meals do you eat per week?: 0  Do you salt your food before tasting it?: No         Symptoms:

## 2021-11-29 ENCOUNTER — OFFICE VISIT (OUTPATIENT)
Dept: FAMILY MEDICINE CLINIC | Age: 81
End: 2021-11-29
Payer: MEDICARE

## 2021-11-29 ENCOUNTER — TELEPHONE (OUTPATIENT)
Dept: FAMILY MEDICINE CLINIC | Age: 81
End: 2021-11-29

## 2021-11-29 VITALS
WEIGHT: 273 LBS | HEART RATE: 64 BPM | OXYGEN SATURATION: 94 % | HEIGHT: 66 IN | SYSTOLIC BLOOD PRESSURE: 130 MMHG | DIASTOLIC BLOOD PRESSURE: 78 MMHG | BODY MASS INDEX: 43.87 KG/M2

## 2021-11-29 DIAGNOSIS — N18.4 TYPE 2 DIABETES MELLITUS WITH STAGE 4 CHRONIC KIDNEY DISEASE, WITHOUT LONG-TERM CURRENT USE OF INSULIN (HCC): ICD-10-CM

## 2021-11-29 DIAGNOSIS — Z00.00 ROUTINE GENERAL MEDICAL EXAMINATION AT A HEALTH CARE FACILITY: Primary | ICD-10-CM

## 2021-11-29 DIAGNOSIS — E11.22 TYPE 2 DIABETES MELLITUS WITH STAGE 4 CHRONIC KIDNEY DISEASE, WITHOUT LONG-TERM CURRENT USE OF INSULIN (HCC): ICD-10-CM

## 2021-11-29 DIAGNOSIS — I10 ESSENTIAL HYPERTENSION: ICD-10-CM

## 2021-11-29 DIAGNOSIS — E03.9 ACQUIRED HYPOTHYROIDISM: ICD-10-CM

## 2021-11-29 DIAGNOSIS — I50.32 CHRONIC DIASTOLIC CONGESTIVE HEART FAILURE (HCC): ICD-10-CM

## 2021-11-29 PROCEDURE — 1123F ACP DISCUSS/DSCN MKR DOCD: CPT | Performed by: INTERNAL MEDICINE

## 2021-11-29 PROCEDURE — 4040F PNEUMOC VAC/ADMIN/RCVD: CPT | Performed by: INTERNAL MEDICINE

## 2021-11-29 PROCEDURE — G0439 PPPS, SUBSEQ VISIT: HCPCS | Performed by: INTERNAL MEDICINE

## 2021-11-29 PROCEDURE — G8484 FLU IMMUNIZE NO ADMIN: HCPCS | Performed by: INTERNAL MEDICINE

## 2021-11-29 RX ORDER — NIFEDIPINE 90 MG/1
90 TABLET, EXTENDED RELEASE ORAL DAILY
Qty: 90 TABLET | Refills: 1 | Status: SHIPPED | OUTPATIENT
Start: 2021-11-29 | End: 2022-03-02

## 2021-11-29 ASSESSMENT — PATIENT HEALTH QUESTIONNAIRE - PHQ9
SUM OF ALL RESPONSES TO PHQ QUESTIONS 1-9: 0
2. FEELING DOWN, DEPRESSED OR HOPELESS: 0
SUM OF ALL RESPONSES TO PHQ QUESTIONS 1-9: 0
SUM OF ALL RESPONSES TO PHQ9 QUESTIONS 1 & 2: 0
1. LITTLE INTEREST OR PLEASURE IN DOING THINGS: 0
SUM OF ALL RESPONSES TO PHQ QUESTIONS 1-9: 0

## 2021-11-29 ASSESSMENT — LIFESTYLE VARIABLES
HOW OFTEN DO YOU HAVE A DRINK CONTAINING ALCOHOL: NEVER
HOW OFTEN DO YOU HAVE A DRINK CONTAINING ALCOHOL: 0
AUDIT-C TOTAL SCORE: INCOMPLETE
AUDIT TOTAL SCORE: INCOMPLETE

## 2021-11-29 NOTE — PROGRESS NOTES
Anesthesia Evaluation     Patient summary reviewed and Nursing notes reviewed   NPO Solid Status: > 8 hours  NPO Liquid Status: > 8 hours           Airway   Mallampati: I  TM distance: >3 FB  Neck ROM: full  No difficulty expected  Dental - normal exam   (+) lower dentures and upper dentures    Pulmonary - normal exam   (+) a smoker Current Abstained day of surgery,   Cardiovascular - normal exam    ECG reviewed    (+) hypertension well controlled,       Neuro/Psych  (+) psychiatric history Depression and Anxiety,     GI/Hepatic/Renal/Endo    (+)  GERD well controlled,      Musculoskeletal     Abdominal  - normal exam    Bowel sounds: normal.   Substance History - negative use     OB/GYN negative ob/gyn ROS         Other   (+) arthritis                   Anesthesia Plan    ASA 2     general with block     intravenous induction   Anesthetic plan, all risks, benefits, and alternatives have been provided, discussed and informed consent has been obtained with: patient.  Use of blood products discussed with patient  Consented to blood products.      Medicare Annual Wellness Visit  Name: Bal Phillips Date: 2021   MRN: <[id number]> Sex: Female   Age: 80 y.o. Ethnicity: Non- / Non    : 1940 Race: White (non-)      Jim Rizo is here for No chief complaint on file. Screenings for behavioral, psychosocial and functional/safety risks, and cognitive dysfunction are all negative except as indicated below. These results, as well as other patient data from the [address] form, are documented in Flowsheets linked to this Encounter. Allergies   Allergen Reactions    Levaquin [Levofloxacin]      Pt was given in er and developed hives and redness    Metoclopramide     Phenazopyridine     Pyridium [Phenazopyridine Hcl]     Reglan [Metoclopramide Hcl]     Reglan [Metoclopramide Hcl]      tremors       Prior to Visit Medications    Medication Sig Taking?  Authorizing Provider   DULoxetine (CYMBALTA) 60 MG extended release capsule TAKE 1 CAPSULE EVERY DAY Yes MAHAD Zamorano CNP   atorvastatin (LIPITOR) 40 MG tablet TAKE 1 AND 1/2 TABLETS EVERY DAY Yes Esa Buitrago MD   zinc oxide 16 % OINT ointment Apply topically 4 times daily as needed for Dry Skin Yes MAHAD Zamorano CNP   NIFEdipine (ADALAT CC) 60 MG extended release tablet TAKE 1 TABLET EVERY DAY Yes Jose Bailey MD   montelukast (SINGULAIR) 10 MG tablet TAKE 1 TABLET EVERY DAY Yes MAHAD López CNP   propafenone (RYTHMOL) 150 MG tablet TAKE 1 TABLET BY MOUTH EVERY 8 HOURS Yes Jose Bailey MD   metoprolol tartrate (LOPRESSOR) 50 MG tablet TAKE 1 TABLET TWICE DAILY Yes MAHAD López CNP   apixaban (ELIQUIS) 2.5 MG TABS tablet Take 1 tablet by mouth 2 times daily Yes Esa Buitrago MD   valsartan (DIOVAN) 160 MG tablet Take 160 mg by mouth daily Yes Historical Provider, MD   albuterol sulfate  (90 Base) MCG/ACT inhaler INHALE 2 PUFFS INTO THE LUNGS EVERY 6 HOURS AS NEEDED FOR WHEEZING Yes MAHAD Monsalve - CNP   BREO ELLIPTA 100-25 MCG/INH AEPB inhaler INHALE 1 PUFF EVERY DAY Yes MAHAD Monsalve CNP   furosemide (LASIX) 40 MG tablet Take 0.5 tablets by mouth daily Yes Ariel Alatorre MD   fluticasone [facility]) 50 MCG/ACT nasal spray USE 2 PUFFS IN EACH NOSTRIL DAILY Yes BIJAN Phan   carbidopa-levodopa (SINEMET)  MG per tablet Take 1 tablet by mouth 4 times daily Yes Historical Provider, MD   Alpha-Lipoic Acid 300 MG TABS Take by mouth daily Yes Historical Provider, MD   lamoTRIgine (LAMICTAL) 25 MG tablet Take 25 mg by mouth 2 times daily 2 tabs BID Yes Historical Provider, MD   Handicap Placard [address] [facility] by Does not apply route DX: Misti Breaux  parkinson's disease  Exp: 9/1/2024 Yes BIJAN Phan   ipratropium-albuterol (DUONEB) 0.5-2.5 (3) MG/3ML SOLN nebulizer solution Inhale 3 mLs into the lungs every 6 hours as needed for Shortness of Breath DX: J45.40 Yes BIJAN Phan   Multiple Vitamins-Minerals (THERAPEUTIC MULTIVITAMIN-MINERALS) tablet Take 1 tablet by mouth daily Yes Historical Provider, MD   aspirin EC 81 MG EC tablet Take 1 tablet by mouth daily. Start taking next week. Indications: OTC Yes Josephine Cisneros MD   ferrous sulfate (IRON 325) 325 (65 Fe) MG tablet Take 325 mg by mouth 2 times daily   Historical Provider, MD   solifenacin (VESICARE) 5 MG tablet Take 1 tablet by mouth daily.   Nohemi Sebastian MD       Past Medical History:   Diagnosis Date    Anemia, unspecified 2010    neg bone marrow    Asthma     Atrial fibrillation ([facility]     Baker's cyst     Basal cell carcinoma of left cheek 3/12/2019    Blind left eye     Carotid artery stenoses     Carotid stenosis 2002    left    Cervical spinal cord compression ([facility] 11/2010    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-sided sciatica 8/9/2016    CKD (chronic kidney disease) stage 3, GFR 30-59 ml/min ([facility])     Community acquired pneumonia of left lower lobe of lung 9/8/2018    CRI     Detached retina, left     Diverticulosis     DJD (degenerative joint disease)     Edema     chronic    Fatty liver     GERD (gastroesophageal reflux disease)     Herniated lumbar intervertebral disc     Hx stage 2 sacral decubitus ulcer 4/13/2018    Hyperlipidemia     Hypertension     Hypothyroid 1/27/2015    Morbid obesity with BMI of 40.0-44.9, adult ([facility] 2/10/2017    BETSY treated with BiPAP     Parkinson's disease ([facility]     Partial symptomatic epilepsy with complex partial seizures, not intractable, without status epilepticus ([address] 1/24/2020    Restless leg syndrome     Restless legs syndrome     Rheumatic fever 1950    2x IN [place]    Sleep apnea     Tremor, essential 8/16/2010    Type 2 diabetes mellitus without complication ([facility]     Type II or unspecified type diabetes mellitus without mention of complication, not stated as uncontrolled        Past Surgical History:   Procedure Laterality Date    APPENDECTOMY      CAROTID ENDARTERECTOMY      left    CATARACT REMOVAL      CERVICAL FUSION  11/2010    CHOLECYSTECTOMY      COLONOSCOPY N/A 3/8/2020    COLONOSCOPY FLEXIBLE W/ DECOMPRESSION performed by Kay Villalobos MD at [address]      IR MIDLINE CATH  10/8/2021    IR MIDLINE CATH 10/8/2021 [facility]    JOINT REPLACEMENT Bilateral 2003    KNEE SURGERY      replacement x 2    SHOULDER SURGERY      TONSILLECTOMY      TUBAL LIGATION      UPPER GASTROINTESTINAL ENDOSCOPY  03/17/2020    Esophagitis    UPPER GASTROINTESTINAL ENDOSCOPY N/A 3/17/2020    EGD performed by Rich Torres MD at [address] [place] [address]         Family History   Problem Relation Age of Onset    Diabetes Mother     Heart Disease Mother     Diabetes Father     Heart Disease Father     Diabetes Sister        CareTeam (Including outside providers/suppliers regularly involved in providing care):   Patient Care Team:  Anette Lee MD as PCP - Conchis Gregg MD as PCP - [facility] Provider  Piter Kaplan MD as Consulting Physician (Pulmonology)  Nury Franco DPM as Consulting Physician (Podiatry)  Iva Shaw MD as Consulting Physician (Nephrology)  Jeferson Miranda MD as Consulting Physician (Cardiology)  MAHAD Odom - CNP as Nurse Practitioner (Family Nurse Practitioner)  Jc Barrera as Consulting Physician (Ophthalmology)  Neela Elmore MD as Consulting Physician (Pulmonology)  Shu Jones RN as [facility]    Wt Readings from Last 3 Encounters:   11/29/21 273 lb (123.8 kg)   10/06/21 273 lb (123.8 kg)   09/30/21 273 lb 4.8 oz (124 kg)     Vitals:    11/29/21 1330   BP: 130/78   Site: Right Upper Arm   Position: Sitting   Cuff Size: Medium Adult   Pulse: 64   SpO2: 94%   Weight: 273 lb (123.8 kg)   Height: 5' 6\" (1.676 m)     Body mass index is 44.06 kg/m². Based upon direct observation of the patient, evaluation of cognition reveals recent and remote memory intact.     General Appearance: alert and oriented to person, place and time, well developed and well- nourished, in no acute distress  Skin: warm and dry, no rash or erythema  Head: normocephalic and atraumatic  Eyes: pupils equal, round, and reactive to light, extraocular eye movements intact, conjunctivae normal  ENT: tympanic membrane, external ear and ear canal normal bilaterally, nose without deformity, nasal mucosa and turbinates normal without polyps  Neck: supple and non-tender without mass, no thyromegaly or thyroid nodules, no cervical lymphadenopathy  Pulmonary/Chest: clear to auscultation bilaterally- no wheezes, rales or rhonchi, normal air movement, no respiratory distress  Cardiovascular: normal rate, regular rhythm, normal S1 and S2, no murmurs, rubs, clicks, or gallops, distal pulses intact, no carotid bruits  Abdomen: soft, non-tender, non-distended, normal bowel sounds, no masses or organomegaly  Extremities: no cyanosis, clubbing or edema  Musculoskeletal: normal range of motion, no joint swelling, deformity or tenderness  Neurologic: reflexes normal and symmetric, no cranial nerve deficit, gait, coordination and speech normal    Patient's complete Health Risk Assessment and screening values have been reviewed and are found in Flowsheets. The following problems were reviewed today and where indicated follow up appointments were made and/or referrals ordered. Positive Risk Factor Screenings with Interventions:     Fall Risk:  2 or more falls in past year?: (!) yes  Fall with injury in past year?: no  Fall Risk Interventions:    · continue in PT at home    Cognitive: Words recalled: 2 Words Recalled  Total Score Interpretation: Positive Mini-Cog  Cognitive Impairment Interventions:  · continue to follow up with neurolgist, Dr. Rickie Sims and ACP:  General  In general, how would you say your health is?: Fair  In the past 7 days, have you experienced any of the following?  New or Increased Pain, New or Increased Fatigue, Loneliness, Social Isolation, Stress or Anger?: (!) Social Isolation  Do you get the social and emotional support that you need?: Yes  Do you have a Living Will?: Yes  Advance Directives     Power of  Living Will ACP-Advance Directive ACP-Power of     Not on File Filed on 09/10/18 Filed Not on File      General Health Risk Interventions:  · Social isolation: patient declines any further intervention for this issue    Health Habits/Nutrition:  Health Habits/Nutrition  Do you exercise for at least 20 minutes 2-3 times per week?: (!) No  Have you lost any weight without trying in the past 3 months?: No  Do you eat only one meal per day?: No  Have you seen the dentist within the past year?: Yes  Body mass index: (!) 44.06  Health Habits/Nutrition Interventions:  · Inadequate physical activity:  patient is not ready to increase his/her physical activity level at this time    Hearing/Vision:  No exam data present  Hearing/Vision  Do you or your family notice any trouble with your hearing that hasn't been managed with hearing aids?: (!) Yes  Do you have difficulty driving, watching TV, or doing any of your daily activities because of your eyesight?: (!) Yes  Have you had an eye exam within the past year?: Yes  Hearing/Vision Interventions:  · Hearing concerns:  patient declines any further evaluation/treatment for hearing issues     ADL:  ADLs  In the past 7 days, did you need help from others to perform any of the following everyday activities? Eating, dressing, grooming, bathing, toileting, or walking/balance?: (!) Walking/Balance  In the past 7 days, did you need help from others to take care of any of the following?  Laundry, housekeeping, banking/finances, shopping, telephone use, food preparation, transportation, or taking medications?: (!) Transportation  ADL Interventions:  · Patient declines any further evaluation/treatment for this issue    Personalized Preventive Plan   Current Health Maintenance Status  Immunization History   Administered Date(s) Administered    COVID-19, Bello Peter, PF, 30mcg/0.3mL 02/10/2021, 02/10/2021, 03/03/2021, 03/03/2021, 11/09/2021    Hepatitis A 05/08/2019    Hepatitis A Adult (Havrix, Vaqta) 05/08/2019, 05/08/2019, 12/17/2019    Hepatitis A Adult (Vaqta) 05/08/2019    Influenza A ([id number]) Vaccine PF IM 01/23/2010, 01/23/2010    Influenza Vaccine, unspecified formulation 08/09/2016, 09/01/2017    Influenza Virus Vaccine 09/27/2011, 10/01/2012, 08/09/2016    Influenza Whole 09/02/2015    Influenza, High Dose (Fluzone 65 yrs and older) 09/09/2015, 09/09/2015, 08/15/2016, 08/15/2016, 09/15/2017, 09/14/2018, 09/21/2020    Influenza, High-dose, [place] Louder, 65 yrs +, IM (Fluzone) 09/21/2020, 09/21/2020    Influenza, Quadv, adjuvanted, 65 yrs +, IM, PF (Fluad) 10/20/2021    Influenza, Triv, inactivated, subunit,

## 2021-11-29 NOTE — PATIENT INSTRUCTIONS
Personalized Preventive Plan for Harjit Mehta - 11/29/2021  Medicare offers a range of preventive health benefits. Some of the tests and screenings are paid in full while other may be subject to a deductible, co-insurance, and/or copay. Some of these benefits include a comprehensive review of your medical history including lifestyle, illnesses that may run in your family, and various assessments and screenings as appropriate. After reviewing your medical record and screening and assessments performed today your provider may have ordered immunizations, labs, imaging, and/or referrals for you. A list of these orders (if applicable) as well as your Preventive Care list are included within your After Visit Summary for your review. Other Preventive Recommendations:    · A preventive eye exam performed by an eye specialist is recommended every 1-2 years to screen for glaucoma; cataracts, macular degeneration, and other eye disorders. · A preventive dental visit is recommended every 6 months. · Try to get at least 150 minutes of exercise per week or 10,000 steps per day on a pedometer . · Order or download the FREE \"Exercise & Physical Activity: Your Everyday Guide\" from [employer]. Call 8-429.688.5845 or search [employer] online. · You need 5484-6143 mg of calcium and 1124-9753 IU of vitamin D per day. It is possible to meet your calcium requirement with diet alone, but a vitamin D supplement is usually necessary to meet this goal.  · When exposed to the sun, use a sunscreen that protects against both UVA and UVB radiation with an SPF of 30 or greater. Reapply every 2 to 3 hours or after sweating, drying off with a towel, or swimming. · Always wear a seat belt when traveling in a car. Always wear a helmet when riding a bicycle or motorcycle.

## 2021-11-30 ENCOUNTER — VIRTUAL VISIT (OUTPATIENT)
Dept: PULMONOLOGY | Age: 81
End: 2021-11-30
Payer: MEDICARE

## 2021-11-30 ENCOUNTER — TELEPHONE (OUTPATIENT)
Dept: PULMONOLOGY | Age: 81
End: 2021-11-30

## 2021-11-30 DIAGNOSIS — G47.33 SEVERE OBSTRUCTIVE SLEEP APNEA: Primary | Chronic | ICD-10-CM

## 2021-11-30 DIAGNOSIS — E66.01 MORBID OBESITY WITH BMI OF 40.0-44.9, ADULT (HCC): Chronic | ICD-10-CM

## 2021-11-30 DIAGNOSIS — R53.83 OTHER FATIGUE: ICD-10-CM

## 2021-11-30 DIAGNOSIS — Z71.89 ENCOUNTER FOR BIPAP USE COUNSELING: ICD-10-CM

## 2021-11-30 PROCEDURE — 99441 PR PHYS/QHP TELEPHONE EVALUATION 5-10 MIN: CPT | Performed by: NURSE PRACTITIONER

## 2021-11-30 ASSESSMENT — SLEEP AND FATIGUE QUESTIONNAIRES
HOW LIKELY ARE YOU TO NOD OFF OR FALL ASLEEP WHILE SITTING QUIETLY AFTER LUNCH WITHOUT ALCOHOL: 0
HOW LIKELY ARE YOU TO NOD OFF OR FALL ASLEEP WHEN YOU ARE A PASSENGER IN A CAR FOR AN HOUR WITHOUT A BREAK: 0
HOW LIKELY ARE YOU TO NOD OFF OR FALL ASLEEP IN A CAR, WHILE STOPPED FOR A FEW MINUTES IN TRAFFIC: 0
ESS TOTAL SCORE: 2
HOW LIKELY ARE YOU TO NOD OFF OR FALL ASLEEP WHILE SITTING AND READING: 1
HOW LIKELY ARE YOU TO NOD OFF OR FALL ASLEEP WHILE SITTING INACTIVE IN A PUBLIC PLACE: 0
HOW LIKELY ARE YOU TO NOD OFF OR FALL ASLEEP WHILE LYING DOWN TO REST IN THE AFTERNOON WHEN CIRCUMSTANCES PERMIT: 1
HOW LIKELY ARE YOU TO NOD OFF OR FALL ASLEEP WHILE SITTING AND TALKING TO SOMEONE: 0
HOW LIKELY ARE YOU TO NOD OFF OR FALL ASLEEP WHILE WATCHING TV: 0

## 2021-11-30 NOTE — PATIENT INSTRUCTIONS
Sleep Hygiene. .. Tips for better sleep. .. Avoid naps. This will ensure you are sleepy at bedtime. If you have to take a nap, sleep less than 1 hour, before 3 pm.  Sleep only when sleepy; this reduces the time you are awake in bed. Regular exercise is recommended to help you deepen your sleep, but not within 4-6 hours of your bedtime. Timing of exercise is important, aim to exercise early in the morning or early afternoon. A light snack may help you fall asleep. Warm milk and foods high in the amino acid tryptophan, such as bananas, may help you to sleep  Be sure to avoid heavy, spicy or sugary foods 4-6 hours before bedtime and avoid at snack time. Stay away from stimulants such as caffeine and nicotine for at least 4-6 hours before bed. Stimulants can interfere with your ability to fall asleep. Caffeine is found in tea, cola, coffee, cocoa and chocolate and is best avoided at bedtime. Nicotine is found in tobacco products. Avoid alcohol 4-6 hours before bedtime. Alcohol has an immediate sleep-inducing effect, after a few hours when alcohol levels fall there is a stimulant or wake-up effect and will cause fragmented sleep. Sleep rituals are important. Give your body clues it is time to slow down and sleep. Examples include; yoga, deep breathing, listen to relaxing music, a hot bath or a few minutes of reading. Have a fixed bedtime and awakening time, Even on weekends! You will feel better keeping a regular sleep cycle, even if you are retired or not working. Get into your favorite sleep position. If not asleep in 30 minutes, get up and do something boring until you feel sleepy. Remember not to expose yourself to bright lights such as TV, phone or tablet screens. Only use your bed for sleeping. Do not use your bed as an office, workroom or recreation room. Use comfortable bedding. Uncomfortable bedding can prevent good sleep. Ensure your bedroom is quiet and comfortable.  A cooler room along with enough blankets to stay warm is recommended. If your room is too noisy, try a white noise machine. If too bright, try black out shades or an eye mask. Dont take worries to bed. Leave worries about work, school etc. behind you when you go to bed. Some people find it helpful to assign a worry period in the evening or late afternoon to write down your worries and get them out of your system. CPAP Equipment Cleaning and Disinfecting Schedule  Equipment Cleaning Frequency Instructions  Disinfecting Frequency   Non-Disposable Filters  Weekly Mild soapy water, Rinse, Air Dry Not Required   Disposable Filters Change as needed  2-4 weeks Do Not Wash Not Required   Hose/tubing Daily Mild soapy water, Rinse, Air Dry Once a week   Mask / Nasal Pillows Daily Mild soapy water, Rinse, Air Dry Once a week   Headgear Weekly Hand wash, Mild soapy water, Rinse, Dry  Not Required   Humidifier Daily Empty water daily  Mild soapy water, Rinse well, Air Dry  Once a week   CPAP Unit As Needed Dust with damp cloth,  No detergents or sprays Not Required         Disinfect (per schedule) with 1 part white vinegar and 3 parts water- soak mask and water chamber for 30 minutes every 1-2 weeks, more often if sick. Allow water/vinegar mixture to run through tubing. Allow all equipment to air dry. Drying Hints:   Always hang tubing away from direct sunlight, as this will cause the tubing to become yellow, brittle and crack over a period of time. DO NOT attach the wet tubing to your CPAP unit to blow-dry it. The moisture from the tubing can drain back into your machine. Moisture in your unit can cause sudden pressure increases or short circuits  DO's and DON'Ts:  - Don't use alcohol-based products to clean your mask, because it can cause the materials to become hard and brittle.    - Don't put headgear in the washer or dryer  - Don't use any caustic or household cleaning solutions such as bleach on your CPAP   equipment.  - Do follow the recommended cleaning schedule. - Do change your disposable filter frequently. Adapted From: [email or web address]. shtm.   These are general suggestions for all models please follow specific s recommendations and specific instructions electronic

## 2021-11-30 NOTE — PROGRESS NOTES
Patient ID: Terry De Jesus is a 80 y.o. female who is being seen today for   Chief Complaint   Patient presents with    Sleep Apnea     1 year sleep        Terry De Jesus is a 80 y.o. female with   for telephone only virtual visit for BETSY follow up. States she is doing well with BIPAP. Patient is using BiPAP 10  hrs/night. Using humidifier. No snoring on BIPAP. The pressure is well tolerated. The mask is comfortable- full face. No mask leak. No significant daytime sleepiness. No driving. No dry nose or throat. Minimal fatigue. Bedtime is 8 pm and rise time is 8 am. Sleep onset is usually few minutes after she turns off TV. Wakes up 1 times at night total. 1 nocturia. It takes few minutes to fall back a sleep. 0-1 naps during the day. No headache in am. No weight gain. No caffienated beverages during the day. No alcohol. ESS is 2.       Sleep Medicine 11/30/2021 12/16/2020 11/25/2020 12/12/2019 11/14/2018 11/9/2017 8/9/2017   Sitting and reading 1 0 0 1 0 0 0   Watching TV 0 0 0 1 0 1 0   Sitting, inactive in a public place (e.g. a theatre or a meeting) 0 0 0 1 0 0 0   As a passenger in a car for an hour without a break 0 0 0 1 0 0 0   Lying down to rest in the afternoon when circumstances permit 1 1 1 1 0 0 0   Sitting and talking to someone 0 0 0 0 0 0 0   Sitting quietly after a lunch without alcohol 0 0 0 1 0 0 1   In a car, while stopped for a few minutes in traffic 0 0 0 0 0 0 0   Total score 2 1 1 6 0 1 1   Neck circumference - - - 16.5 17 17.25 17.5       Past Medical History:  Past Medical History:   Diagnosis Date    Anemia, unspecified 2010    neg bone marrow    Asthma     Atrial fibrillation ([address]     Baker's cyst     Basal cell carcinoma of left cheek 3/12/2019    Blind left eye     Carotid artery stenoses     Carotid stenosis 2002    left    Cervical spinal cord compression ([address] 11/2010    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-sided sciatica 8/9/2016    CKD (chronic kidney disease) stage 3, GFR 30-59 ml/min (HCC)     Community acquired pneumonia of left lower lobe of lung 9/8/2018    CRI     Detached retina, left     Diverticulosis     DJD (degenerative joint disease)     Edema     chronic    Fatty liver     GERD (gastroesophageal reflux disease)     Herniated lumbar intervertebral disc     Hx stage 2 sacral decubitus ulcer 4/13/2018    Hyperlipidemia     Hypertension     Hypothyroid 1/27/2015    Morbid obesity with BMI of 40.0-44.9, adult ([address] 2/10/2017    BETSY treated with BiPAP     Parkinson's disease ([address]     Partial symptomatic epilepsy with complex partial seizures, not intractable, without status epilepticus ([address] 1/24/2020    Restless leg syndrome     Restless legs syndrome     Rheumatic fever 1950    2x IN [place]    Sleep apnea     Tremor, essential 8/16/2010    Type 2 diabetes mellitus without complication ([address]     Type II or unspecified type diabetes mellitus without mention of complication, not stated as uncontrolled        Past Surgical History:        Procedure Laterality Date    APPENDECTOMY      CAROTID ENDARTERECTOMY      left    CATARACT REMOVAL      CERVICAL FUSION  11/2010    CHOLECYSTECTOMY      COLONOSCOPY N/A 3/8/2020    COLONOSCOPY FLEXIBLE W/ DECOMPRESSION performed by [facility]a [facility] at [facility]      HYSTERECTOMY      IR MIDLINE CATH  10/8/2021    IR MIDLINE CATH 10/8/2021 [address] SPECIAL PROCEDURES    JOINT REPLACEMENT Bilateral 2003    KNEE SURGERY      replacement x 2    SHOULDER SURGERY      TONSILLECTOMY      TUBAL LIGATION      UPPER GASTROINTESTINAL ENDOSCOPY  03/17/2020    Esophagitis    UPPER GASTROINTESTINAL ENDOSCOPY N/A 3/17/2020    EGD performed by Mariela Castro MD at [address]         Allergies:  is allergic to levaquin [levofloxacin], metoclopramide, phenazopyridine, pyridium [phenazopyridine hcl], reglan [metoclopramide hcl], and reglan [metoclopramide hcl]. Social History:    TOBACCO:   reports that she has never smoked. She has never used smokeless tobacco.  ETOH:   reports no history of alcohol use.     Family History:       Problem Relation Age of Onset    Diabetes Mother     Heart Disease Mother     Diabetes Father     Heart Disease Father     Diabetes Sister        Current Medications:    Current Outpatient Medications:     NIFEdipine (PROCARDIA XL) 90 MG extended release tablet, Take 1 tablet by mouth daily, Disp: 90 tablet, Rfl: 1    DULoxetine (CYMBALTA) 60 MG extended release capsule, TAKE 1 CAPSULE EVERY DAY, Disp: 90 capsule, Rfl: 1    atorvastatin (LIPITOR) 40 MG tablet, TAKE 1 AND 1/2 TABLETS EVERY DAY, Disp: 135 tablet, Rfl: 3    zinc oxide 16 % OINT ointment, Apply topically 4 times daily as needed for Dry Skin, Disp: 50 g, Rfl: 1    montelukast (SINGULAIR) 10 MG tablet, TAKE 1 TABLET EVERY DAY, Disp: 90 tablet, Rfl: 1    propafenone (RYTHMOL) 150 MG tablet, TAKE 1 TABLET BY MOUTH EVERY 8 HOURS, Disp: 270 tablet, Rfl: 3    metoprolol tartrate (LOPRESSOR) 50 MG tablet, TAKE 1 TABLET TWICE DAILY, Disp: 180 tablet, Rfl: 1    apixaban (ELIQUIS) 2.5 MG TABS tablet, Take 1 tablet by mouth 2 times daily, Disp: 180 tablet, Rfl: 3    valsartan (DIOVAN) 160 MG tablet, Take 160 mg by mouth daily, Disp: , Rfl:     albuterol sulfate  (90 Base) MCG/ACT inhaler, INHALE 2 PUFFS INTO THE LUNGS EVERY 6 HOURS AS NEEDED FOR WHEEZING, Disp: 54 g, Rfl: 3    BREO ELLIPTA 100-25 MCG/INH AEPB inhaler, INHALE 1 PUFF EVERY DAY, Disp: 1 each, Rfl: 3    ferrous sulfate (IRON 325) 325 (65 Fe) MG tablet, Take 325 mg by mouth 2 times daily , Disp: , Rfl:     furosemide (LASIX) 40 MG tablet, Take 0.5 tablets by mouth daily, Disp: 60 tablet, Rfl: 5    fluticasone (FLONASE) 50 MCG/ACT nasal spray, USE 2 PUFFS IN EACH NOSTRIL DAILY, Disp: 3 Bottle, Rfl: 10    carbidopa-levodopa (SINEMET)  MG per tablet, Take 1 tablet by mouth 4 times daily, Disp: , Rfl:     Alpha-Lipoic Acid 300 MG TABS, Take by mouth daily, Disp: , Rfl:     lamoTRIgine (LAMICTAL) 25 MG tablet, Take 25 mg by mouth 2 times daily 2 tabs BID, Disp: , Rfl:     Handicap Placard MISC, by Does not apply route DX: G20  parkinson's disease Exp: 9/1/2024, Disp: 1 each, Rfl: 0    ipratropium-albuterol (DUONEB) 0.5-2.5 (3) MG/3ML SOLN nebulizer solution, Inhale 3 mLs into the lungs every 6 hours as needed for Shortness of Breath DX: J45.40, Disp: 360 mL, Rfl: 1    Multiple Vitamins-Minerals (THERAPEUTIC MULTIVITAMIN-MINERALS) tablet, Take 1 tablet by mouth daily, Disp: , Rfl:     aspirin EC 81 MG EC tablet, Take 1 tablet by mouth daily. Start taking next week. Indications: OTC, Disp: 30 tablet, Rfl: 2    NIFEdipine (ADALAT CC) 60 MG extended release tablet, TAKE 1 TABLET EVERY DAY (Patient not taking: Reported on 11/30/2021), Disp: 90 tablet, Rfl: 3      Objective:   PHYSICAL EXAM:    There were no vitals taken for this visit. DATA:   5/6/17 Split night PSG AHI 59.8, low SpO2 52%, PLMS 86.9, Improved sleep related breathing with BiPAP, recommendations BIPAP 22/15 with 1 LO2 bleed in    BIPAP compliance data:  Compliance download report from 10/10/17 to 11/8/17  showed patient is using machine 8:49 hrs/night with 100% compliance and AHI 1.0 within this time frame. 30/30days with greater than 4 hours of machine use. BIPAP 22/15 cm H20    Compliance download report from 10/15/18 to 11/13/18 showed patient is using machine 7:58 hrs/night with 100% compliance and AHI 3.7 within this time frame. 30/30days with greater than 4 hours of machine use. 90% pressure BIPAP 22/15 cm H20    Compliance download report from 11/12/19 to 12/11/19  showed patient is using machine 9:34 hrs/night with 97% compliance and AHI 3.0 within this time frame. 29/30days with greater than 4 hours of machine use.   BIPAP 22/15 cm coverage, and has provided verbal consent to proceed: Yes      I affirm this is a Patient Initiated Episode with a Patient who has not had a related appointment within my department in the past 7 days or scheduled within the next 24 hours. Patient identification was verified at the start of the visit: Yes    Total Time: minutes: 5-10 minutes    The visit was conducted pursuant to the emergency declaration under the [address] [place] [address], [address] [place] [address] authority and the [employer] Act. Patient identification was verified, and a caregiver was present when appropriate. The patient was located in a state where the provider was credentialed to provide care.     Note: not billable if this call serves to triage the patient into an appointment for the relevant concern      Aaron Payne, APRN - CNP

## 2021-11-30 NOTE — TELEPHONE ENCOUNTER
Within this Telehealth Consent, the terms you and yours refer to the person using the Telehealth Service (Service), or in the case of a use of the Service by or on behalf of a minor, you and yours refer to and include (i) the parent or legal guardian who provides consent to the use of the Service by such minor or uses the Service on behalf of such minor, and (ii) the minor for whom consent is being provided or on whose behalf the Service is being utilized. When using Service, you will be consulting with your health care providers via the use of Telehealth.   Telehealth involves the delivery of healthcare services using electronic communications, information technology or other means between a healthcare provider and a patient who are not in the same physical location. Telehealth may be used for diagnosis, treatment, follow-up and/or patient education, and may include, but is not limited to, one or more of the following:    Electronic transmission of medical records, photo images, personal health information or other data between a patient and a healthcare provider    Interactions between a patient and healthcare provider via audio, video and/or data communications    Use of output data from medical devices, sound and video files    Anticipated Benefits   The use of Telehealth by your Provider(s) through the Service may have the following possible benefits:    Making it easier and more efficient for you to access medical care and treatment for the conditions treated by such Provider(s) utilizing the Service    Allowing you to obtain medical care and treatment by Provider(s) at times that are convenient for you    Enabling you to interact with Provider(s) without the necessity of an in-office appointment     Possible Risks   While the use of Telehealth can provide potential benefits for you, there are also potential risks associated with the use of Telehealth.  These risks include, but may not be limited to the following:    Your Provider(s) may not able to provide medical treatment for your particular condition and you may be required to seek alternative healthcare or emergency care services.  The electronic systems or other security protocols or safeguards used in the Service could fail, causing a breach of privacy of your medical or other information.  Given regulatory requirements in certain jurisdictions, your Provider(s) diagnosis and/or treatment options, especially pertaining to certain prescriptions, may be limited. Acceptance   1. You understand that Services will be provided via Telehealth. This process involves the use of HIPAA compliant and secure, real-time audio-visual interfacing with a qualified and appropriately trained provider located at [facility]. 2. You understand that, under no circumstances, will this session be recorded. 3. You understand that the Provider(s) at [facility] and other clinical participants will be party to the information obtained during the Telehealth session in accordance with best medical practices. 4. You understand that the information obtained during the Telehealth session will be used to help determine the most appropriate treatment options. 5. You understand that You have the right to revoke this consent at any point in time. 6. You understand that Telehealth is voluntary, and that continued treatment is not dependent upon consent. 7. You understand that, in the event of non-consent to Telehealth services and/or technical difficulties, you will obtain services as typically provided in the absence of Telehealth technology. 8. You understand that this consent will be kept in Your medical record. 9. No potential benefits from the use of Telehealth or specific results can be guaranteed. Your condition may not be cured or improved and, in some cases, may get worse.    10. There are limitations in the provision of medical care and treatment via Telehealth and the Service and you may not be able to receive diagnosis and/or treatment through the Service for every condition for which you seek diagnosis and/or treatment. 11. There are potential risks to the use of Telehealth, including but not limited to the risks described in this Telehealth Consent. 12. Your Provider(s) have discussed the use of Telehealth and the Service with you, including the benefits and risks of such and you have provided oral consent to your Provider(s) for the use of Telehealth and the Service. 15. You understand that it is your duty to provide your Provider(s) truthful, accurate and complete information, including all relevant information regarding care that you may have received or may be receiving from other healthcare providers outside of the Service. 14. You understand that each of your Provider(s) may determine in his or sole discretion that your condition is not suitable for diagnosis and/or treatment using the Service, and that you may need to seek medical care and treatment a specialist or other healthcare provider, outside of the Service. 15. You understand that you are fully responsible for payment for all services provided by Provider(s) or through use of the Service and that you may not be able to use third-party insurance. 16. You represent that (a) you have read this Telehealth Consent carefully, (b) you understand the risks and benefits of the Service and the use of Telehealth in the medical care and treatment provided to you by Provider(s) using the Service, and (c) you have the legal capacity and authority to provide this consent for yourself and/or the minor for which you are consenting under applicable federal and state laws, including laws relating to the age of [de-identified] and/or parental/guardian consent.    17. You give your informed consent to the use of Telehealth by Provider(s) using the Service under the terms described in the Terms of Service and this Telehealth Consent. The patient was read the following statement and has consented to the visit as of 11/30/21. The patient has been scheduled for their first telehealth visit on 11/30/21 with Moy Miranda CNP.

## 2021-12-02 ENCOUNTER — OFFICE VISIT (OUTPATIENT)
Dept: CARDIOLOGY CLINIC | Age: 81
End: 2021-12-02
Payer: MEDICARE

## 2021-12-02 VITALS
BODY MASS INDEX: 42.67 KG/M2 | HEART RATE: 68 BPM | SYSTOLIC BLOOD PRESSURE: 130 MMHG | DIASTOLIC BLOOD PRESSURE: 50 MMHG | WEIGHT: 265.5 LBS | OXYGEN SATURATION: 95 % | HEIGHT: 66 IN

## 2021-12-02 DIAGNOSIS — I48.0 PAF (PAROXYSMAL ATRIAL FIBRILLATION) (HCC): Primary | ICD-10-CM

## 2021-12-02 DIAGNOSIS — E78.2 MIXED HYPERLIPIDEMIA: Chronic | ICD-10-CM

## 2021-12-02 DIAGNOSIS — I34.0 MITRAL VALVE INSUFFICIENCY, UNSPECIFIED ETIOLOGY: ICD-10-CM

## 2021-12-02 DIAGNOSIS — I50.32 CHRONIC DIASTOLIC CONGESTIVE HEART FAILURE (HCC): ICD-10-CM

## 2021-12-02 DIAGNOSIS — I10 ESSENTIAL HYPERTENSION: Chronic | ICD-10-CM

## 2021-12-02 PROCEDURE — G8400 PT W/DXA NO RESULTS DOC: HCPCS | Performed by: INTERNAL MEDICINE

## 2021-12-02 PROCEDURE — 99214 OFFICE O/P EST MOD 30 MIN: CPT | Performed by: INTERNAL MEDICINE

## 2021-12-02 PROCEDURE — 93000 ELECTROCARDIOGRAM COMPLETE: CPT | Performed by: INTERNAL MEDICINE

## 2021-12-02 PROCEDURE — 1123F ACP DISCUSS/DSCN MKR DOCD: CPT | Performed by: INTERNAL MEDICINE

## 2021-12-02 PROCEDURE — 1090F PRES/ABSN URINE INCON ASSESS: CPT | Performed by: INTERNAL MEDICINE

## 2021-12-02 PROCEDURE — G8427 DOCREV CUR MEDS BY ELIG CLIN: HCPCS | Performed by: INTERNAL MEDICINE

## 2021-12-02 PROCEDURE — G8484 FLU IMMUNIZE NO ADMIN: HCPCS | Performed by: INTERNAL MEDICINE

## 2021-12-02 PROCEDURE — G8417 CALC BMI ABV UP PARAM F/U: HCPCS | Performed by: INTERNAL MEDICINE

## 2021-12-02 PROCEDURE — 1036F TOBACCO NON-USER: CPT | Performed by: INTERNAL MEDICINE

## 2021-12-02 PROCEDURE — 4040F PNEUMOC VAC/ADMIN/RCVD: CPT | Performed by: INTERNAL MEDICINE

## 2021-12-02 NOTE — LETTER
December 2,2021  Madonna Shannon  1940      [address] Office Note  12/2/2021     Follow-up (no new cardiac complaints)      Subjective: Madonna Shannon is here for follow up for PAF, dCHF, HTN, HLD and 9 month follow up     Kake:    She fell when her walker got away from her 6 weeks ago has black eye on left. 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 She had received her COVID vaccine and tolerating it well. Her blood pressure is checked at home and it ranges from 150-160/60-80's, heart rate in the 60's. She stated she is not very active due to being a fall risk. She had weakness in her legs that she thinks is related to inactivity but also her Parkinson's. She had leg weakness after  walking a short distance. She had some pain in her right hand from arthritis. Patient currently denied any weight gain, edema, palpitations, chest pain, shortness of breath, and syncope. From 10/7/21 to 10/10/21 she was in the hospital for diarrhea. She had been having abdominal cramping and diarrheal symptoms for a week prior. Stool samples were obtained and came back positive for C. Difficile and was then admitted to the hospital.   Today she reports that she had fall 3 to 4 months ago due to her walker. She did not pass out. She sustained swelling and bruising to the left side of her face due to this fall. She did not break any bones in her face. She is currently using O2 therapy at home and this is monitored through home care. He BP is slightly elevated, she was put on nifedipine 90 mg to help with this. She is no longer on nifedipine 60 mg. At home her BP  readings are usually. 207/-256 systolic and diastolic number is in the  80's. She uses a bi-pap machine at night for sleep apnea and is on 2L of O2 at night with machine use. She denies chest pain, sob, dizziness,syncope, and BLLE edema.        PMH:  She has a past medical history of PAF, dCHF, HTN, HLD as well as DM, BETSY on CPAP, CVD s/p R-CEA, CKD3, Parkinsons and COPD. Her most recent stress test on 17 showed no evidence of  myocardial ischemia or scar. Carotid dopplers 3/16/17 showed moderate plaque. An echocardiogram from 18 demonstrates an EF of 25-45%, grade I diastolic dysfunction with normal filling pressure, mild to moderate mitral regurgitation, systolic pulmonic artery pressure (SPAP) is estimated at 51 mmHg consistentwith mild pulmonary hypertension (Right atrial pressure of 3 mmHg). She uses BIPAP nightly with 2 liter of O2. Most recent echocardiogram from 2020 showed an EF of 55%. Review of Systems:         12 point ROS negative in all areas as listed below except as in [place]  Constitutional, EENT,pulmonary, GI, , skin, neurological, hematological, endocrine, Psychiatric    Reviewed past medical history, social, and family history. Smoking none  Alcohol none  Family history- mother  at age 80 of CHF. Father had heart issues and  at age 80,   5 years ago.      Past Medical History:   Diagnosis Date    Anemia, unspecified     neg bone marrow    Asthma     Atrial fibrillation ([address]     Baker's cyst     Basal cell carcinoma of left cheek 3/12/2019    Blind left eye     Carotid artery stenoses     Carotid stenosis 2002    left    Cervical spinal cord compression ([address] 2010    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-sided sciatica 2016    CKD (chronic kidney disease) stage 3, GFR 30-59 ml/min ([facility])     Community acquired pneumonia of left lower lobe of lung 2018    CRI     Detached retina, left     Diverticulosis     DJD (degenerative joint disease)     Edema     chronic    Fatty liver     GERD (gastroesophageal reflux disease)     Herniated lumbar intervertebral disc     Hx stage 2 sacral decubitus ulcer 2018    Hyperlipidemia     Hypertension     Hypothyroid 2015    Morbid obesity with BMI of 40.0-44.9, adult ([address] 2/10/2017    BETSY treated with BiPAP     Parkinson's disease [facility]     Partial symptomatic epilepsy with complex partial seizures, not intractable, without status epilepticus ([facility] 1/24/2020    Restless leg syndrome     Restless legs syndrome     Rheumatic fever 1950    2x IN [place]    Sleep apnea     Tremor, essential 8/16/2010    Type 2 diabetes mellitus without complication ([facility]     Type II or unspecified type diabetes mellitus without mention of complication, not stated as uncontrolled      Past Surgical History:   Procedure Laterality Date    APPENDECTOMY      CAROTID ENDARTERECTOMY      left    CATARACT REMOVAL      CERVICAL FUSION  11/2010    CHOLECYSTECTOMY      COLONOSCOPY N/A 3/8/2020    COLONOSCOPY FLEXIBLE W/ DECOMPRESSION performed by Alma [facility]      IR MIDLINE CATH  10/8/2021    IR MIDLINE CATH 10/8/2021 [facility] PROCEDURES    JOINT REPLACEMENT Bilateral 2003    KNEE SURGERY      replacement x 2    SHOULDER SURGERY      TONSILLECTOMY      TUBAL LIGATION      UPPER GASTROINTESTINAL ENDOSCOPY  03/17/2020    Esophagitis    UPPER GASTROINTESTINAL ENDOSCOPY N/A 3/17/2020    EGD performed by Fady Wahl MD at [facility]         Objective:   BP (!) 130/50   Pulse 68   Ht 5' 6\" (1.676 m)   Wt 265 lb 8 oz (120.4 kg)   SpO2 95%   BMI 42.85 kg/m²     Wt Readings from Last 3 Encounters:   12/02/21 265 lb 8 oz (120.4 kg)   11/29/21 273 lb (123.8 kg)   10/06/21 273 lb (123.8 kg)       Physical Exam:  General: No Respiratory distress, appears well developed and well nourished.    Eyes:  Sclera non icteric left cornea opaque  Nose/Sinuses:  negative findings: nose shows no deformity, asymmetry, or inflammation, nasal mucosa normal, septum midline with no perforation or bleeding  Back:  no pain to palpation  Joint:  no active joint inflammation  Musculoskeletal: negative  Skin:  Warm and dry  Neck:  Negative for JVD and Carotid Bruits. Chest:  Clear to auscultation, respiration easy  Cardiovascular:  RRR diminished , S1S2 normal, no murmur, no rub or thrill. Extremities:  Nonpitting  edema,  No clubbing, cyanosis,  Pulses: not palpable in feet.   Neuro: intact    Medications:   Outpatient Encounter Medications as of 12/2/2021   Medication Sig Dispense Refill    NIFEdipine (PROCARDIA XL) 90 MG extended release tablet Take 1 tablet by mouth daily 90 tablet 1    DULoxetine (CYMBALTA) 60 MG extended release capsule TAKE 1 CAPSULE EVERY DAY 90 capsule 1    atorvastatin (LIPITOR) 40 MG tablet TAKE 1 AND 1/2 TABLETS EVERY  tablet 3    zinc oxide 16 % OINT ointment Apply topically 4 times daily as needed for Dry Skin 50 g 1    montelukast (SINGULAIR) 10 MG tablet TAKE 1 TABLET EVERY DAY 90 tablet 1    propafenone (RYTHMOL) 150 MG tablet TAKE 1 TABLET BY MOUTH EVERY 8 HOURS 270 tablet 3    metoprolol tartrate (LOPRESSOR) 50 MG tablet TAKE 1 TABLET TWICE DAILY 180 tablet 1    apixaban (ELIQUIS) 2.5 MG TABS tablet Take 1 tablet by mouth 2 times daily 180 tablet 3    valsartan (DIOVAN) 160 MG tablet Take 160 mg by mouth daily      albuterol sulfate  (90 Base) MCG/ACT inhaler INHALE 2 PUFFS INTO THE LUNGS EVERY 6 HOURS AS NEEDED FOR WHEEZING 54 g 3    BREO ELLIPTA 100-25 MCG/INH AEPB inhaler INHALE 1 PUFF EVERY DAY 1 each 3    ferrous sulfate (IRON 325) 325 (65 Fe) MG tablet Take 325 mg by mouth 2 times daily       furosemide (LASIX) 40 MG tablet Take 0.5 tablets by mouth daily 60 tablet 5    fluticasone (FLONASE) 50 MCG/ACT nasal spray USE 2 PUFFS IN EACH NOSTRIL DAILY 3 Bottle 10    carbidopa-levodopa (SINEMET)  MG per tablet Take 1 tablet by mouth 4 times daily      Alpha-Lipoic Acid 300 MG TABS Take by mouth daily      lamoTRIgine (LAMICTAL) 25 MG tablet Take 25 mg by mouth 2 times daily 2 tabs BID      Handicap Placard MISC by Does not apply route DX: G20  parkinson's disease  Exp: 9/1/2024 1 each 0    ipratropium-albuterol (DUONEB) 0.5-2.5 (3) MG/3ML SOLN nebulizer solution Inhale 3 mLs into the lungs every 6 hours as needed for Shortness of Breath DX: J45.40 360 mL 1    Multiple Vitamins-Minerals (THERAPEUTIC MULTIVITAMIN-MINERALS) tablet Take 1 tablet by mouth daily      aspirin EC 81 MG EC tablet Take 1 tablet by mouth daily. Start taking next week. Indications: OTC 30 tablet 2    [DISCONTINUED] NIFEdipine (ADALAT CC) 60 MG extended release tablet TAKE 1 TABLET EVERY DAY 90 tablet 3    [DISCONTINUED] solifenacin (VESICARE) 5 MG tablet Take 1 tablet by mouth daily. 30 tablet 3     No facility-administered encounter medications on file as of 12/2/2021. Lab Data:  LIPID:   Lab Results   Component Value Date    CHOL 147 09/27/2021    CHOL 143 03/01/2021    CHOL 153 02/04/2019     Lab Results   Component Value Date    TRIG 194 (H) 09/27/2021    TRIG 123 03/01/2021    TRIG 106 02/04/2019     Lab Results   Component Value Date    HDL 49 09/27/2021    HDL 53 03/01/2021    HDL 54 01/24/2020     Lab Results   Component Value Date    LDLCALC 59 09/27/2021    LDLCALC 65 03/01/2021    LDLCALC 65 01/24/2020     Lab Results   Component Value Date    LABVLDL 39 09/27/2021    LABVLDL 25 03/01/2021    LABVLDL 41 01/24/2020     No results found for: CHOLHDLRATIO  PT/INR: No results for input(s): PROTIME, INR in the last 72 hours. A1C:   Lab Results   Component Value Date    LABA1C 6.4 09/27/2021     BNP:  No results for input(s): BNP in the last 72 hours. IMAGING:   I have reviewed the following tests and documented in this encounter as follows:   Discussed with patient  Most recent labs in epic reviewed and discussed with the patient and spouse . EKG 12.4.21  NSR no ischemia or infarction  Echocardiogram 7/9/2020  Summary   Left ventricular systolic function is normal with a visually estimated   ejection fraction of 55%.    The left ventricle is normal in size with mild concentric hypertrophy. No regional wall motion abnormalities are noted. Normal left ventricular diastolic function. Systolic pulmonary artery pressure (SPAP) is normal and estimated at 23 mmHg   (right atrial pressure 3 mmHg). Echo doppler 9.27.18   Summary   Normal left ventricular systolic function with ejection fraction of 55-60%. No regional wall motion abnormalites are seen. Mild concentric left ventricular hypertrophy. Grade I diastolic dysfunction with normal filling pressure. Mild to moderate mitral regurgitation. The left atrium is mildly dilated. Moderate tricuspid regurgitation. Systolic pulmonic artery pressure (SPAP) is estimated at 51 mmHg consistent   with mild pulmonary hypertension (Right atrial pressure of 3 mmHg). Compared to previous study from 3- no changes noted. Stress Test Carola Arreola) 4/25/17  Summary  There is normal isotope uptake at stress and rest. There is no evidence of  myocardial ischemia or scar. Hyperdynamic LV systolic function with FO>43%  with uniform wall motion. Low risk study. Carotid dopplers 3/16/17  Summary    1. There is moderate plaque seen in the right internal carotid artery with  an estimated diameter reduction of approaching 50%, this could be  underestimated due to calcific shadowing. 2. There is moderate plaque seen in the left internal carotid artery with an  estimated diameter reduction of approaching 50%. 3. The vertebral arteries are patent with antegrade flow bilaterally. 4. Compared to previous exam on 11/25/2015, criteria have been changed to  suggest approaching 50% stenosis in the bilateral internal carotid artery. There is no significant change from previous exam       Echo 3/17/2017:  No change from echo done 3/4/2014. Left ventricular systolic function is normal with the ejection fraction   estimated at 55%. No regional wall motion abnormalities.    Grade II diastolic dysfunction with elevated filling pressure. Mild concentric left ventricular hypertrophy with a sigmoid septum. No   outflow obstruction. Left ventricle size is normal.   Mild mitral annular calcification. Mitral valve leaflets are structurally normal.   Calcification in the chordae of the mitral valve. Mild mitral valve regurgitation. Aortic valve leaflets are mildly thickened with normal opening. No aortic valve regurgitation. Echocardiogram 9/27/18  Summary   Normal left ventricular systolic function with ejection fraction of 55-60%. No regional wall motion abnormalites are seen. Mild concentric left ventricular hypertrophy. Grade I diastolic dysfunction with normal filling pressure. Mild to moderate mitral regurgitation. The left atrium is mildly dilated. Moderate tricuspid regurgitation. Systolic pulmonic artery pressure (SPAP) is estimated at 51 mmHg consistent   with mild pulmonary hypertension (Right atrial pressure of 3 mmHg). Compared to previous study from 3- no changes noted. Echo Complete: 7/9/20      Summary   Left ventricular systolic function is normal with a visually estimated   ejection fraction of 55%. The left ventricle is normal in size with mild concentric hypertrophy. No regional wall motion abnormalities are noted. Normal left ventricular diastolic function. Systolic pulmonary artery pressure (SPAP) is normal and estimated at 23 mmHg   (right atrial pressure 3 mmHg). Assessment:  Encounter Diagnoses   Name Primary?  Essential hypertension     Mixed hyperlipidemia     Chronic diastolic congestive heart failure (HCC)     Mitral valve insufficiency, unspecified etiology     PAF (paroxysmal atrial fibrillation) ([facility] Yes         1. Paroxysmal atrial fibrillation currently appears to be sinus. 2. Chronic diastolic failure well compensated  3. Mild to moderate mitral regurgitation. no symptoms or heart failure. 4. Mild pulmonary hypertension   5. Hyperlipidemia   -3/1/2021 , HDL 53, LDL 65,   6. Hypertension   7. Diabetes mellitis  8. Carotid artery disease   9. Obesity   10. Sleep apnea    -on BIPAP   11. Dizziness on standing probably orthostatic hypotension  12. Chronic anemia due to CKD  13. CKD 3       Plan:  1. Stop taking nifedipine 60 mg tab. changed to 90mg daily  2. Continue taking all medications the same at this time. 3. EKG while in office~to assess rhythm. Normal rhythm noted. 4. Follow up with me in 6 months      QUALITY MEASURES  1. Tobacco Cessation Counseling: NA  2. Retake of BP if >140/90:   NA  3. Documentation to PCP/referring for new patient:  Sent to PCP at close of office visit  4. CAD patient on anti-platelet: Yes  5. CAD patient on STATIN therapy:  Yes  6. Patient with CHF and aFib on anticoagulation:  Yes       Scribe's attestation: This note was scribed in the presence of Dr. Elo Ulloa MD   by Ruthie Hoffman LPN      I, Dr. Elo Ulloa, personally performed the services described in this documentation, as scribed by the above signed scribe in my presence. It is both accurate and complete to my knowledge. I agree with the details independently gathered by the clinical support staff, while the remaining scribed note accurately describes my personal service to the patient.       Alivia Schultz M.D

## 2021-12-02 NOTE — PROGRESS NOTES
[facility] Office Note  12/2/2021     Follow-up (no new cardiac complaints)      Subjective: Sintia Kuhn is here for follow up for PAF, dCHF, HTN, HLD and 9 month follow up     Ak Chin:    She fell when her walker got away from her 6 weeks ago has black eye on left. 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 She had received her COVID vaccine and tolerating it well. Her blood pressure is checked at home and it ranges from 150-160/60-80's, heart rate in the 60's. She stated she is not very active due to being a fall risk. She had weakness in her legs that she thinks is related to inactivity but also her Parkinson's. She had leg weakness after  walking a short distance. She had some pain in her right hand from arthritis. Patient currently denied any weight gain, edema, palpitations, chest pain, shortness of breath, and syncope. From 10/7/21 to 10/10/21 she was in the hospital for diarrhea. She had been having abdominal cramping and diarrheal symptoms for a week prior. Stool samples were obtained and came back positive for C. Difficile and was then admitted to the hospital.   Today she reports that she had fall 3 to 4 months ago due to her walker. She did not pass out. She sustained swelling and bruising to the left side of her face due to this fall. She did not break any bones in her face. She is currently using O2 therapy at home and this is monitored through home care. He BP is slightly elevated, she was put on nifedipine 90 mg to help with this. She is no longer on nifedipine 60 mg. At home her BP  readings are usually. 523/-133 systolic and diastolic number is in the  80's. She uses a bi-pap machine at night for sleep apnea and is on 2L of O2 at night with machine use. She denies chest pain, sob, dizziness,syncope, and BLLE edema. PMH:  She has a past medical history of PAF, dCHF, HTN, HLD as well as DM, BETSY on CPAP, CVD s/p R-CEA, CKD3, Parkinsons and COPD.   Her most recent stress test on 17 showed no evidence of  myocardial ischemia or scar. Carotid dopplers 3/16/17 showed moderate plaque. An echocardiogram from 18 demonstrates an EF of 39-84%, grade I diastolic dysfunction with normal filling pressure, mild to moderate mitral regurgitation, systolic pulmonic artery pressure (SPAP) is estimated at 51 mmHg consistentwith mild pulmonary hypertension (Right atrial pressure of 3 mmHg). She uses BIPAP nightly with 2 liter of O2. Most recent echocardiogram from 2020 showed an EF of 55%. Review of Systems:         12 point ROS negative in all areas as listed below except as in [place]  Constitutional, EENT,pulmonary, GI, , skin, neurological, hematological, endocrine, Psychiatric    Reviewed past medical history, social, and family history. Smoking none  Alcohol none  Family history- mother  at age 80 of CHF. Father had heart issues and  at age 80,   5 years ago.      Past Medical History:   Diagnosis Date    Anemia, unspecified     neg bone marrow    Asthma     Atrial fibrillation ([address]     Baker's cyst     Basal cell carcinoma of left cheek 3/12/2019    Blind left eye     Carotid artery stenoses     Carotid stenosis 2002    left    Cervical spinal cord compression ([address] 2010    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-sided sciatica 2016    CKD (chronic kidney disease) stage 3, GFR 30-59 ml/min ([facility])     Community acquired pneumonia of left lower lobe of lung 2018    CRI     Detached retina, left     Diverticulosis     DJD (degenerative joint disease)     Edema     chronic    Fatty liver     GERD (gastroesophageal reflux disease)     Herniated lumbar intervertebral disc     Hx stage 2 sacral decubitus ulcer 2018    Hyperlipidemia     Hypertension     Hypothyroid 2015    Morbid obesity with BMI of 40.0-44.9, adult ([address] 2/10/2017    BETSY treated with BiPAP     Parkinson's disease ([address]     Partial symptomatic Carotid Bruits. Chest:  Clear to auscultation, respiration easy  Cardiovascular:  RRR diminished , S1S2 normal, no murmur, no rub or thrill. Extremities:  Nonpitting  edema,  No clubbing, cyanosis,  Pulses: not palpable in feet.   Neuro: intact    Medications:   Outpatient Encounter Medications as of 12/2/2021   Medication Sig Dispense Refill    NIFEdipine (PROCARDIA XL) 90 MG extended release tablet Take 1 tablet by mouth daily 90 tablet 1    DULoxetine (CYMBALTA) 60 MG extended release capsule TAKE 1 CAPSULE EVERY DAY 90 capsule 1    atorvastatin (LIPITOR) 40 MG tablet TAKE 1 AND 1/2 TABLETS EVERY  tablet 3    zinc oxide 16 % OINT ointment Apply topically 4 times daily as needed for Dry Skin 50 g 1    montelukast (SINGULAIR) 10 MG tablet TAKE 1 TABLET EVERY DAY 90 tablet 1    propafenone (RYTHMOL) 150 MG tablet TAKE 1 TABLET BY MOUTH EVERY 8 HOURS 270 tablet 3    metoprolol tartrate (LOPRESSOR) 50 MG tablet TAKE 1 TABLET TWICE DAILY 180 tablet 1    apixaban (ELIQUIS) 2.5 MG TABS tablet Take 1 tablet by mouth 2 times daily 180 tablet 3    valsartan (DIOVAN) 160 MG tablet Take 160 mg by mouth daily      albuterol sulfate  (90 Base) MCG/ACT inhaler INHALE 2 PUFFS INTO THE LUNGS EVERY 6 HOURS AS NEEDED FOR WHEEZING 54 g 3    BREO ELLIPTA 100-25 MCG/INH AEPB inhaler INHALE 1 PUFF EVERY DAY 1 each 3    ferrous sulfate (IRON 325) 325 (65 Fe) MG tablet Take 325 mg by mouth 2 times daily       furosemide (LASIX) 40 MG tablet Take 0.5 tablets by mouth daily 60 tablet 5    fluticasone (FLONASE) 50 MCG/ACT nasal spray USE 2 PUFFS IN EACH NOSTRIL DAILY 3 Bottle 10    carbidopa-levodopa (SINEMET)  MG per tablet Take 1 tablet by mouth 4 times daily      Alpha-Lipoic Acid 300 MG TABS Take by mouth daily      lamoTRIgine (LAMICTAL) 25 MG tablet Take 25 mg by mouth 2 times daily 2 tabs BID      Handicap Placard MISC by Does not apply route DX: G20  parkinson's disease  Exp: 9/1/2024 1 each 0  ipratropium-albuterol (DUONEB) 0.5-2.5 (3) MG/3ML SOLN nebulizer solution Inhale 3 mLs into the lungs every 6 hours as needed for Shortness of Breath DX: J45.40 360 mL 1    Multiple Vitamins-Minerals (THERAPEUTIC MULTIVITAMIN-MINERALS) tablet Take 1 tablet by mouth daily      aspirin EC 81 MG EC tablet Take 1 tablet by mouth daily. Start taking next week. Indications: OTC 30 tablet 2    [DISCONTINUED] NIFEdipine (ADALAT CC) 60 MG extended release tablet TAKE 1 TABLET EVERY DAY 90 tablet 3    [DISCONTINUED] solifenacin (VESICARE) 5 MG tablet Take 1 tablet by mouth daily. 30 tablet 3     No facility-administered encounter medications on file as of 12/2/2021. Lab Data:  LIPID:   Lab Results   Component Value Date    CHOL 147 09/27/2021    CHOL 143 03/01/2021    CHOL 153 02/04/2019     Lab Results   Component Value Date    TRIG 194 (H) 09/27/2021    TRIG 123 03/01/2021    TRIG 106 02/04/2019     Lab Results   Component Value Date    HDL 49 09/27/2021    HDL 53 03/01/2021    HDL 54 01/24/2020     Lab Results   Component Value Date    LDLCALC 59 09/27/2021    LDLCALC 65 03/01/2021    LDLCALC 65 01/24/2020     Lab Results   Component Value Date    LABVLDL 39 09/27/2021    LABVLDL 25 03/01/2021    LABVLDL 41 01/24/2020     No results found for: CHOLHDLRATIO  PT/INR: No results for input(s): PROTIME, INR in the last 72 hours. A1C:   Lab Results   Component Value Date    LABA1C 6.4 09/27/2021     BNP:  No results for input(s): BNP in the last 72 hours. IMAGING:   I have reviewed the following tests and documented in this encounter as follows:   Discussed with patient  Most recent labs in epic reviewed and discussed with the patient and spouse . EKG 12.4.21  NSR no ischemia or infarction  Echocardiogram 7/9/2020  Summary   Left ventricular systolic function is normal with a visually estimated   ejection fraction of 55%. The left ventricle is normal in size with mild concentric hypertrophy.    No regional wall motion abnormalities are noted. Normal left ventricular diastolic function. Systolic pulmonary artery pressure (SPAP) is normal and estimated at 23 mmHg   (right atrial pressure 3 mmHg). Echo doppler 9.27.18   Summary   Normal left ventricular systolic function with ejection fraction of 55-60%. No regional wall motion abnormalites are seen. Mild concentric left ventricular hypertrophy. Grade I diastolic dysfunction with normal filling pressure. Mild to moderate mitral regurgitation. The left atrium is mildly dilated. Moderate tricuspid regurgitation. Systolic pulmonic artery pressure (SPAP) is estimated at 51 mmHg consistent   with mild pulmonary hypertension (Right atrial pressure of 3 mmHg). Compared to previous study from 3- no changes noted. Stress Test Tennyson Alberto) 4/25/17  Summary  There is normal isotope uptake at stress and rest. There is no evidence of  myocardial ischemia or scar. Hyperdynamic LV systolic function with WT>89%  with uniform wall motion. Low risk study. Carotid dopplers 3/16/17  Summary    1. There is moderate plaque seen in the right internal carotid artery with  an estimated diameter reduction of approaching 50%, this could be  underestimated due to calcific shadowing. 2. There is moderate plaque seen in the left internal carotid artery with an  estimated diameter reduction of approaching 50%. 3. The vertebral arteries are patent with antegrade flow bilaterally. 4. Compared to previous exam on 11/25/2015, criteria have been changed to  suggest approaching 50% stenosis in the bilateral internal carotid artery. There is no significant change from previous exam       Echo 3/17/2017:  No change from echo done 3/4/2014. Left ventricular systolic function is normal with the ejection fraction   estimated at 55%. No regional wall motion abnormalities. Grade II diastolic dysfunction with elevated filling pressure.    Mild concentric left ventricular hypertrophy with a sigmoid septum. No   outflow obstruction. Left ventricle size is normal.   Mild mitral annular calcification. Mitral valve leaflets are structurally normal.   Calcification in the chordae of the mitral valve. Mild mitral valve regurgitation. Aortic valve leaflets are mildly thickened with normal opening. No aortic valve regurgitation. Echocardiogram 9/27/18  Summary   Normal left ventricular systolic function with ejection fraction of 55-60%. No regional wall motion abnormalites are seen. Mild concentric left ventricular hypertrophy. Grade I diastolic dysfunction with normal filling pressure. Mild to moderate mitral regurgitation. The left atrium is mildly dilated. Moderate tricuspid regurgitation. Systolic pulmonic artery pressure (SPAP) is estimated at 51 mmHg consistent   with mild pulmonary hypertension (Right atrial pressure of 3 mmHg). Compared to previous study from 3- no changes noted. Echo Complete: 7/9/20      Summary   Left ventricular systolic function is normal with a visually estimated   ejection fraction of 55%. The left ventricle is normal in size with mild concentric hypertrophy. No regional wall motion abnormalities are noted. Normal left ventricular diastolic function. Systolic pulmonary artery pressure (SPAP) is normal and estimated at 23 mmHg   (right atrial pressure 3 mmHg). Assessment:  Encounter Diagnoses   Name Primary?  Essential hypertension     Mixed hyperlipidemia     Chronic diastolic congestive heart failure (HCC)     Mitral valve insufficiency, unspecified etiology     PAF (paroxysmal atrial fibrillation) ([facility]) Yes         1. Paroxysmal atrial fibrillation currently appears to be sinus. 2. Chronic diastolic failure well compensated  3. Mild to moderate mitral regurgitation. no symptoms or heart failure. 4. Mild pulmonary hypertension   5.  Hyperlipidemia   -3/1/2021 , HDL 53, LDL 65, TG 123  6. Hypertension   7. Diabetes mellitis  8. Carotid artery disease   9. Obesity   10. Sleep apnea    -on BIPAP   11. Dizziness on standing probably orthostatic hypotension  12. Chronic anemia due to CKD  13. CKD 3       Plan:  1. Stop taking nifedipine 60 mg tab. changed to 90mg daily  2. Continue taking all medications the same at this time. 3. EKG while in office~to assess rhythm. Normal rhythm noted. 4. Follow up with me in 6 months      QUALITY MEASURES  1. Tobacco Cessation Counseling: NA  2. Retake of BP if >140/90:   NA  3. Documentation to PCP/referring for new patient:  Sent to PCP at close of office visit  4. CAD patient on anti-platelet: Yes  5. CAD patient on STATIN therapy:  Yes  6. Patient with CHF and aFib on anticoagulation:  Yes       Scribe's attestation: This note was scribed in the presence of Dr. Bety Pelaez MD   by Joey Torres LPN      I, Dr. Bety Pelaez, personally performed the services described in this documentation, as scribed by the above signed scribe in my presence. It is both accurate and complete to my knowledge. I agree with the details independently gathered by the clinical support staff, while the remaining scribed note accurately describes my personal service to the patient.       Luanne Schultz M.D

## 2021-12-02 NOTE — PATIENT INSTRUCTIONS
1. Stop taking nifedipine 60 mg tab. 2. Continue taking all medications the same at this time. 3. EKG while in office~to assess rhythm. Normal rhythm noted.   4. Follow up with me in 6 months

## 2021-12-20 ENCOUNTER — CARE COORDINATION (OUTPATIENT)
Dept: CARE COORDINATION | Age: 81
End: 2021-12-20

## 2021-12-20 NOTE — CARE COORDINATION
Ambulatory Care Coordination Note  12/20/2021  CM Risk Score: 17  Charlson 10 Year Mortality Risk Score: 100%     ACC: Camilo Cosby RN    Summary Note: ACM completed care coordination follow up call with patients  Alvaro Garza. Alvaro Garza said that patient is doing well and he was out running errands this morning. Alvaro Garza said that [address] will be going down to 1 day a week for therapy but patient is progressing. Alvaro Garza said that patient is no longer having runny stools with C-diff and has been able to leave the house. ACM did review CHF education that was sent out around Thanksgiving time, Alvaro Garza had no questions at this time. Alvaro Garza said that is stable with COPD, CHF, and DM management at this time. ACM encouraged patient and  to call if there were any questions or concerns at this time. Plan:    F/U call 1 month  Possible CC graduation. Care Coordination Interventions    Program Enrollment: Complex Care  Referral from Primary Care Provider: No  Suggested Interventions and [address]: Completed (Comment: SKilled; Patient is going to one day a week [address] visits 12/20/21 [facility]JT)  Occupational Therapy: Completed  Physical Therapy: Completed         Goals Addressed                 This Visit's Progress     Reduce Falls    On track     I will reduce my risk of falls by the following: Use walking aids like cane or walker    Barriers: overwhelmed by complexity of regimen  Plan for overcoming my barriers: Patient will use motorized wheelchair and lift equipment   Confidence: 8/10  Anticipated Goal Completion Date: 1/5/21            Prior to Admission medications    Medication Sig Start Date End Date Taking?  Authorizing Provider   NIFEdipine (PROCARDIA XL) 90 MG extended release tablet Take 1 tablet by mouth daily 11/29/21   Andreina Osullivan MD   DULoxetine (CYMBALTA) 60 MG extended release capsule TAKE 1 CAPSULE EVERY DAY 11/8/21   Grover Tidwell APRN - CNP atorvastatin (LIPITOR) 40 MG tablet TAKE 1 AND 1/2 TABLETS EVERY DAY 11/4/21   Marlene Alves MD   zinc oxide 16 % OINT ointment Apply topically 4 times daily as needed for Dry Skin 11/3/21   MAHAD Morillo - CNP   montelukast (SINGULAIR) 10 MG tablet TAKE 1 TABLET EVERY DAY 11/3/21   [facility]   propafenone (RYTHMOL) 150 MG tablet TAKE 1 TABLET BY MOUTH EVERY 8 HOURS 11/3/21   Ruiz Klely MD   metoprolol tartrate (LOPRESSOR) 50 MG tablet TAKE 1 TABLET TWICE DAILY 11/3/21   [facility]   apixaban (ELIQUIS) 2.5 MG TABS tablet Take 1 tablet by mouth 2 times daily 9/13/21   Marlene Alves MD   valsartan (DIOVAN) 160 MG tablet Take 160 mg by mouth daily    Historical Provider, MD   albuterol sulfate  (90 Base) MCG/ACT inhaler INHALE 2 PUFFS INTO THE LUNGS EVERY 6 HOURS AS NEEDED FOR WHEEZING 7/14/21   [facility] APRN - [facility]   BREO ELLIPTA 100-25 MCG/INH AEPB inhaler INHALE 1 PUFF EVERY DAY 7/14/21   [facility], APRN - CNP   ferrous sulfate (IRON 325) 325 (65 Fe) MG tablet Take 325 mg by mouth 2 times daily  2/4/21 12/2/21  Historical Provider, MD   furosemide (LASIX) 40 MG tablet Take 0.5 tablets by mouth daily 1/11/21   Ruiz Kelly MD   fluticasone Bartolo [place]) 50 MCG/ACT nasal spray USE 2 PUFFS IN [facility] NOSTRIL DAILY 1/8/21   BIJAN Bergman   carbidopa-levodopa (SINEMET)  MG per tablet Take 1 tablet by mouth 4 times daily    Historical Provider, MD   Alpha-Lipoic Acid 300 MG TABS Take by mouth daily    Historical Provider, MD   lamoTRIgine (LAMICTAL) 25 MG tablet Take 25 mg by mouth 2 times daily 2 tabs BID    Historical Provider, MD   Handicap Placard MISC by Does not apply route DX: Gregor Bird  parkinson's disease  Exp: 9/1/2024 9/27/19   BIJAN Bergman   ipratropium-albuterol (DUONEB) 0.5-2.5 (3) MG/3ML SOLN nebulizer solution Inhale 3 mLs into the lungs every 6 hours as needed for Shortness of Breath DX: J45.40 2/6/19   BIJAN Bergman Multiple Vitamins-Minerals (THERAPEUTIC MULTIVITAMIN-MINERALS) tablet Take 1 tablet by mouth daily    Historical Provider, MD   solifenacin (VESICARE) 5 MG tablet Take 1 tablet by mouth daily. 6/25/13 10/29/13  Warren Avila MD   aspirin EC 81 MG EC tablet Take 1 tablet by mouth daily. Start taking next week. Indications: OTC 2/25/13   Tabitha Khan MD       Future Appointments   Date Time Provider Katy Booker   12/21/2021  9:30 AM [facility] [place] [facility]   12/22/2021 10:00 AM [facility] RM 1 [facility] [place]   12/28/2021 10:30 AM Enriqueta Pulido MD [facility] PULM [facility]   3/22/2022  2:30 PM Warren Avila MD [facility] [place] - [facility]   5/24/2022  8:15 AM MD Geraldine [facility]   11/30/2022 10:00 AM MAHAD Marquez - [facility]     ,   Diabetes Assessment    Medic Alert ID: No  Meal Planning: Avoidance of concentrated sweets   How often do you test your blood sugar?: Daily (Comment: -125)   Do you have barriers with adherence to non-pharmacologic self-management interventions?  (Nutrition/Exercise/Self-Monitoring): No   Have you ever had to go to the ED for symptoms of low blood sugar?: No       No patient-reported symptoms      ,   Congestive Heart Failure Assessment    Are you currently restricting fluids?:  (Comment: 64 oz per day)  Do you understand a low sodium diet?: Yes  Do you understand how to read food labels?: Yes  How many restaurant meals do you eat per week?: 0  Do you salt your food before tasting it?: No     No patient-reported symptoms      Symptoms:        ,   COPD Assessment    Does the patient understand envrionmental exposure?: Yes  Is the patient able to verbalize Rescue vs. Long Acting medications?: Yes  Does the patient have a nebulizer?: Yes  Does the patient use a space with inhaled medications?: Yes     No patient-reported symptoms         Symptoms:         and   General Assessment    Do you have any symptoms that are causing concern?: No

## 2021-12-21 ENCOUNTER — HOSPITAL ENCOUNTER (OUTPATIENT)
Dept: CT IMAGING | Age: 81
Discharge: HOME | End: 2021-12-21
Payer: MEDICARE

## 2021-12-21 DIAGNOSIS — R91.1 PULMONARY NODULE: Primary | ICD-10-CM

## 2021-12-21 DIAGNOSIS — R91.1 PULMONARY NODULE: ICD-10-CM

## 2021-12-21 PROCEDURE — 71250 CT THORAX DX C-: CPT

## 2021-12-21 NOTE — TELEPHONE ENCOUNTER
Last office visit 11/29/2021     Last written 7- x 3 refills    Next office visit scheduled 3/22/2022    Requested Prescriptions     Pending Prescriptions Disp Refills    fluticasone-vilanterol (BREO ELLIPTA) 100-25 MCG/INH AEPB inhaler 3 each 3     Sig: INHALE 1 PUFF EVERY DAY

## 2021-12-22 ENCOUNTER — HOSPITAL ENCOUNTER (OUTPATIENT)
Dept: WOMENS IMAGING | Age: 81
Discharge: HOME | End: 2021-12-22
Payer: MEDICARE

## 2021-12-22 VITALS — HEIGHT: 66 IN | WEIGHT: 262 LBS | BODY MASS INDEX: 42.11 KG/M2

## 2021-12-22 DIAGNOSIS — Z12.31 VISIT FOR SCREENING MAMMOGRAM: ICD-10-CM

## 2021-12-22 PROCEDURE — 77067 SCR MAMMO BI INCL CAD: CPT

## 2021-12-22 RX ORDER — FLUTICASONE FUROATE AND VILANTEROL TRIFENATATE 100; 25 UG/1; UG/1
POWDER RESPIRATORY (INHALATION)
Qty: 3 EACH | Refills: 3 | Status: SHIPPED | OUTPATIENT
Start: 2021-12-22

## 2021-12-23 ENCOUNTER — TELEPHONE (OUTPATIENT)
Dept: CARDIOLOGY CLINIC | Age: 81
End: 2021-12-23

## 2021-12-23 NOTE — TELEPHONE ENCOUNTER
PRAKASH OOT please advise. Spoke to patient  Clearance. States she denies SOB, cough, or any swelling to lower legs. Weight today 266.4 pounds, weight yesterday 263.6 pounds. VS today 145/58, HR 65.

## 2021-12-23 NOTE — TELEPHONE ENCOUNTER
Watch and wait for now. If weight continues to go up (4# or more from baseline) then increase lasix for 2-3 days. List shows lasix 40mg tablet and takes 20mg daily. If correct then take full pill for total of 40mg daily.  Watch low salt diet and restrict fluids < 64 ounces daily (Total fluid intake)

## 2021-12-23 NOTE — TELEPHONE ENCOUNTER
12/23-Rashmi WALLACE from [employer] contacted [facility]. Odalis Davis stated pts  contacted her about pt's 3 lb weight over night.  Odalis Davis stated that the pt was mostly on feet yesterday and sis eat pumpkin pie & ice cream. Odalis Davis stated that the pt is only receiving PT from AM. Please contact pts  or Odalis Davis @ [phone]

## 2021-12-28 ENCOUNTER — OFFICE VISIT (OUTPATIENT)
Dept: PULMONOLOGY | Age: 81
End: 2021-12-28
Payer: MEDICARE

## 2021-12-28 VITALS
DIASTOLIC BLOOD PRESSURE: 64 MMHG | OXYGEN SATURATION: 94 % | BODY MASS INDEX: 42.59 KG/M2 | TEMPERATURE: 97 F | HEART RATE: 88 BPM | HEIGHT: 66 IN | RESPIRATION RATE: 16 BRPM | SYSTOLIC BLOOD PRESSURE: 138 MMHG | WEIGHT: 265 LBS

## 2021-12-28 DIAGNOSIS — R91.1 PULMONARY NODULE: Primary | ICD-10-CM

## 2021-12-28 PROCEDURE — 4040F PNEUMOC VAC/ADMIN/RCVD: CPT | Performed by: INTERNAL MEDICINE

## 2021-12-28 PROCEDURE — G8417 CALC BMI ABV UP PARAM F/U: HCPCS | Performed by: INTERNAL MEDICINE

## 2021-12-28 PROCEDURE — G8484 FLU IMMUNIZE NO ADMIN: HCPCS | Performed by: INTERNAL MEDICINE

## 2021-12-28 PROCEDURE — 1123F ACP DISCUSS/DSCN MKR DOCD: CPT | Performed by: INTERNAL MEDICINE

## 2021-12-28 PROCEDURE — G8400 PT W/DXA NO RESULTS DOC: HCPCS | Performed by: INTERNAL MEDICINE

## 2021-12-28 PROCEDURE — G8427 DOCREV CUR MEDS BY ELIG CLIN: HCPCS | Performed by: INTERNAL MEDICINE

## 2021-12-28 PROCEDURE — 1036F TOBACCO NON-USER: CPT | Performed by: INTERNAL MEDICINE

## 2021-12-28 PROCEDURE — 1090F PRES/ABSN URINE INCON ASSESS: CPT | Performed by: INTERNAL MEDICINE

## 2021-12-28 PROCEDURE — 99213 OFFICE O/P EST LOW 20 MIN: CPT | Performed by: INTERNAL MEDICINE

## 2021-12-28 ASSESSMENT — SLEEP AND FATIGUE QUESTIONNAIRES
HOW LIKELY ARE YOU TO NOD OFF OR FALL ASLEEP WHILE LYING DOWN TO REST IN THE AFTERNOON WHEN CIRCUMSTANCES PERMIT: 2
HOW LIKELY ARE YOU TO NOD OFF OR FALL ASLEEP WHEN YOU ARE A PASSENGER IN A CAR FOR AN HOUR WITHOUT A BREAK: 0
HOW LIKELY ARE YOU TO NOD OFF OR FALL ASLEEP WHILE SITTING AND READING: 0
NECK CIRCUMFERENCE (INCHES): 18.5
HOW LIKELY ARE YOU TO NOD OFF OR FALL ASLEEP WHILE SITTING INACTIVE IN A PUBLIC PLACE: 0
ESS TOTAL SCORE: 2
HOW LIKELY ARE YOU TO NOD OFF OR FALL ASLEEP IN A CAR, WHILE STOPPED FOR A FEW MINUTES IN TRAFFIC: 0
HOW LIKELY ARE YOU TO NOD OFF OR FALL ASLEEP WHILE SITTING QUIETLY AFTER LUNCH WITHOUT ALCOHOL: 0
HOW LIKELY ARE YOU TO NOD OFF OR FALL ASLEEP WHILE SITTING AND TALKING TO SOMEONE: 0
HOW LIKELY ARE YOU TO NOD OFF OR FALL ASLEEP WHILE WATCHING TV: 0

## 2021-12-28 NOTE — PROGRESS NOTES
Pulmonary Follow-up Note  Chief Complaint: abnormal CT CHEST  Referring Provider: hospital f/u      Interval history: Patient is here for follow up of pulmonary nodule and has obtained follow up CT scan at [facility]; since last clinic visit reporting no hemoptysis, unexplained weight loss, new nodules, masses or lumps, no lymphadenopathy, no unexplained fevers or nightsweats. Presenting history: had fall resulting in rib fractures, CT performed in ED     reports that she has never smoked. She has never used smokeless tobacco.     Review of Systems:    Physical Exam:  Blood pressure 138/64, pulse 88, temperature 97 °F (36.1 °C), temperature source Temporal, resp. rate 16, height 5' 6\" (1.676 m), weight 265 lb (120.2 kg), SpO2 94 %, not currently breastfeeding. Constitutional:  No acute distress. In wheelchair  HENT:  Oropharynx is clear and moist.  Bilateral subtotal occlusive dried wax in ear canals  Neck: No tracheal deviation present. Cardiovascular: Normal heart sounds. + lower extremity edema. Pulmonary/Chest: No wheezes. No rhonchi. No rales. No decreased breath sounds. No accessory muscle usage or stridor. Musculoskeletal: No cyanosis. No clubbing. Skin: Skin is warm and dry. Psychiatric: Normal mood and affect. Neurologic: speech fluent, alert and oriented, strength symmetric         Data:   12/16/20 CT CHEST   Impression   1. Mild bibasilar atelectasis, without acute airspace consolidation. 2. While a majority of the patient's previously identified pulmonary nodules   have resolved and were likely infectious or inflammatory in etiology on the   study of 06/09/2020, there is a new 8 mm ground-glass nodule within the left   lower lobe.  While most likely benign, further follow-up of this nodule is as   advised below.  Namely, recommend chest CT follow-up in 6-12 months to   confirm persistence.      12/21/21 CT CHEST  Mediastinum:  No evidence of mediastinal, hilar or axillary lymphadenopathy. Left heart is prominent.  Minimal calcifications of the left coronary artery. Thyroid gland appears normal.       Lungs/pleura: Evidence of prior granulomatous disease in the medial left   upper chest.  The nodular densities in the lung bases identified on the   recent CT abdomen have resolved.  Minimal residual scarring is noted in the   medial left lower lobe with the largest nodule is present.  Findings are   compatible with resolution of inflammatory disease. Gweneth Webster is a peripheral   subpleural elongated soft tissue focus which may be the result of previous   infection measuring 2.1 by 0.6 cm on axial imaging number 51, series 3. There is a nodular density in the medial right lower lobe on image number 34   series 3 measuring 5 mm.  Other smaller peripheral nodular densities are   noted in the upper lungs with small areas of apical pleural thickening and   scarring.  Areas of ground-glass opacity are noted in both lungs which may   represent hypo stasis related to poor inspiratory effort or minimal pulmonary   edema.       Upper Abdomen:  Limited images of the upper abdomen demonstrate no acute   abnormality.       Soft Tissues/Bones: There is accentuation of normal thoracic kyphosis.  Bone   density appears appropriate.  Previous lower anterior cervical fusion noted. Soft tissues appear unremarkable.           Impression   1.  Improvement in suspicious nodules identified on the recent CT abdomen   with minimal residual scarring in their prior locations.  This is compatible   with resolution of inflammatory disease/pneumonia.       2.  5 mm nodule in the medial superior segment of the right lower lobe, band   of parenchymal density in the peripheral left upper lobe may also reflect   inflammatory change.  Follow-up chest CT without contrast in 3-6 months   recommended.        Assessment:  · Pulmonary Nodules are stable or resolved except for new 5 mm RLL nodule     Not addressed today · BETSY f/b outside provider  · Right lateral inferior chest wall pain 2/2 mechanical fall Feb 2020  · Parkinson Disease  · Asthma f/b PCP   · PND  · Paroxysmal atrial fibrillation on Eliquis   · Diabetes mellitus     Plan:   · CT CHEST no IV dye in 6 months, see me after   · Can try debrox for earwax, f/u with PCP

## 2021-12-28 NOTE — PATIENT INSTRUCTIONS
Over the counter DEBROX or similar 5 drops each ear 4 times a day for 4 days then 1 or 2 drops once daily to keep clear.

## 2022-01-01 DIAGNOSIS — I48.0 PAF (PAROXYSMAL ATRIAL FIBRILLATION) (HCC): ICD-10-CM

## 2022-01-01 DIAGNOSIS — R06.02 SOB (SHORTNESS OF BREATH): ICD-10-CM

## 2022-01-01 DIAGNOSIS — J44.9 CHRONIC OBSTRUCTIVE PULMONARY DISEASE, UNSPECIFIED COPD TYPE (HCC): ICD-10-CM

## 2022-01-01 LAB
A/G RATIO: 1.2 (ref 1.1–2.2)
ALBUMIN SERPL-MCNC: 3.8 G/DL (ref 3.4–5)
ALP BLD-CCNC: 122 U/L (ref 40–129)
ALT SERPL-CCNC: <5 U/L (ref 10–40)
ANION GAP SERPL CALCULATED.3IONS-SCNC: 14 MMOL/L (ref 3–16)
AST SERPL-CCNC: 12 U/L (ref 15–37)
BASOPHILS ABSOLUTE: 0 K/UL (ref 0–0.2)
BASOPHILS RELATIVE PERCENT: 0.3 %
BILIRUB SERPL-MCNC: <0.2 MG/DL (ref 0–1)
BUN BLDV-MCNC: 78 MG/DL (ref 7–20)
CALCIUM SERPL-MCNC: 9.2 MG/DL (ref 8.3–10.6)
CHLORIDE BLD-SCNC: 97 MMOL/L (ref 99–110)
CO2: 27 MMOL/L (ref 21–32)
CREAT SERPL-MCNC: 2.3 MG/DL (ref 0.6–1.2)
EOSINOPHILS ABSOLUTE: 0.2 K/UL (ref 0–0.6)
EOSINOPHILS RELATIVE PERCENT: 1.3 %
GFR SERPL CREATININE-BSD FRML MDRD: 21 ML/MIN/{1.73_M2}
GLUCOSE BLD-MCNC: 110 MG/DL (ref 70–99)
HCT VFR BLD CALC: 28.7 % (ref 36–48)
HEMOGLOBIN: 8.9 G/DL (ref 12–16)
LYMPHOCYTES ABSOLUTE: 1.2 K/UL (ref 1–5.1)
LYMPHOCYTES RELATIVE PERCENT: 8.3 %
MAGNESIUM: 3.3 MG/DL (ref 1.8–2.4)
MCH RBC QN AUTO: 28 PG (ref 26–34)
MCHC RBC AUTO-ENTMCNC: 31 G/DL (ref 31–36)
MCV RBC AUTO: 90.4 FL (ref 80–100)
MONOCYTES ABSOLUTE: 0.8 K/UL (ref 0–1.3)
MONOCYTES RELATIVE PERCENT: 5.5 %
NEUTROPHILS ABSOLUTE: 12.1 K/UL (ref 1.7–7.7)
NEUTROPHILS RELATIVE PERCENT: 84.6 %
PDW BLD-RTO: 13 % (ref 12.4–15.4)
PLATELET # BLD: 266 K/UL (ref 135–450)
PMV BLD AUTO: 10.8 FL (ref 5–10.5)
POTASSIUM SERPL-SCNC: 7.1 MMOL/L (ref 3.5–5.1)
PRO-BNP: 3443 PG/ML (ref 0–449)
RBC # BLD: 3.17 M/UL (ref 4–5.2)
SODIUM BLD-SCNC: 138 MMOL/L (ref 136–145)
TOTAL PROTEIN: 7.1 G/DL (ref 6.4–8.2)
WBC # BLD: 14.3 K/UL (ref 4–11)

## 2022-01-26 ENCOUNTER — TELEPHONE (OUTPATIENT)
Dept: FAMILY MEDICINE CLINIC | Age: 82
End: 2022-01-26

## 2022-01-26 NOTE — TELEPHONE ENCOUNTER
Estefani Reyes with [facility] calling needing verbal OK for home PT once a week for 9 weeks for patient.

## 2022-02-09 ENCOUNTER — CARE COORDINATION (OUTPATIENT)
Dept: CARE COORDINATION | Age: 82
End: 2022-02-09

## 2022-02-09 NOTE — CARE COORDINATION
Ambulatory Care Coordination Note  2/9/2022  CM Risk Score: 17  Charlson 10 Year Mortality Risk Score: 100%     ACC: Radha Membreno RN    Summary Note: ACM completed follow up call with patient and spouse Konrad Skelton. Patient said she will continue with maintenance therapy once a week without a stop date. Patient had no other concerns to report at this time. ACM spoke to Konrad Skelton who completed the remainder of the call. Konrad Skelton said patient was doing well. Konrad Skelton said patient having difficulty with chewing but has a dental appt coming up soon. ACM asked if there was anything at this time ACM could help with and Konrad Skelton declined. ACM encouraged patient and Konrad Skelton to contact [facility] if there were was anything in the future ACM could assist with. Plan:  Graduate from [facility] Interventions    Program Enrollment: Complex Care  Referral from Primary Care Provider: No  Suggested Interventions and [address]: Completed (Comment: SKilled; Patient is going to one day a week Elly 80 visits 12/20/21 T)  Occupational Therapy: Completed  Physical Therapy: Completed         Goals Addressed                 This Visit's Progress     COMPLETED: Reduce Falls    On track     I will reduce my risk of falls by the following: Use walking aids like cane or walker    Barriers: overwhelmed by complexity of regimen  Plan for overcoming my barriers: Patient will use motorized wheelchair and lift equipment   Confidence: 8/10  Anticipated Goal Completion Date: 1/5/21            Prior to Admission medications    Medication Sig Start Date End Date Taking?  Authorizing Provider   fluticasone-vilanterol (BREO ELLIPTA) 100-25 MCG/INH AEPB inhaler INHALE 1 PUFF EVERY DAY 12/22/21   MAHAD Gusman - CNP   NIFEdipine (PROCARDIA XL) 90 MG extended release tablet Take 1 tablet by mouth daily 11/29/21   Jose Oppenheim, MD   DULoxetine (CYMBALTA) 60 MG extended release capsule TAKE 1 CAPSULE EVERY DAY 11/8/21   MAHAD Liang CNP   atorvastatin (LIPITOR) 40 MG tablet TAKE 1 AND 1/2 TABLETS EVERY DAY 11/4/21   Veronica Wong MD   zinc oxide 16 % OINT ointment Apply topically 4 times daily as needed for Dry Skin 11/3/21   MAHAD Liang CNP   montelukast (SINGULAIR) 10 MG tablet TAKE 1 TABLET EVERY DAY 11/3/21   MAHAD Frausto CNP   propafenone (RYTHMOL) 150 MG tablet TAKE 1 TABLET BY MOUTH EVERY 8 HOURS 11/3/21   Giorgio Pedroza MD   metoprolol tartrate (LOPRESSOR) 50 MG tablet TAKE 1 TABLET TWICE DAILY 11/3/21   MAHAD Frausto CNP   apixaban (ELIQUIS) 2.5 MG TABS tablet Take 1 tablet by mouth 2 times daily 9/13/21   Veronica Wong MD   valsartan (DIOVAN) 160 MG tablet Take 160 mg by mouth daily    Historical Provider, MD   albuterol sulfate  (90 Base) MCG/ACT inhaler INHALE 2 PUFFS INTO THE LUNGS EVERY 6 HOURS AS NEEDED FOR WHEEZING 7/14/21   MAHAD Frausto CNP   ferrous sulfate (IRON 325) 325 (65 Fe) MG tablet Take 325 mg by mouth 2 times daily  2/4/21 12/2/21  Historical Provider, MD   furosemide (LASIX) 40 MG tablet Take 0.5 tablets by mouth daily 1/11/21   Giorgio Pedroza MD   fluticasone [facility]) 50 MCG/ACT nasal spray USE 2 PUFFS IN [facility] NOSTRIL DAILY 1/8/21   BIJAN Calix   carbidopa-levodopa (SINEMET)  MG per tablet Take 1 tablet by mouth 4 times daily    Historical Provider, MD   Alpha-Lipoic Acid 300 MG TABS Take by mouth daily    Historical Provider, MD   lamoTRIgine (LAMICTAL) 25 MG tablet Take 25 mg by mouth 2 times daily 2 tabs BID    Historical Provider, MD   Handicap Placard MISC by Does not apply route DX: Radha Paul  parkinson's disease  Exp: 9/1/2024 9/27/19   BIJAN Calix   ipratropium-albuterol (DUONEB) 0.5-2.5 (3) MG/3ML SOLN nebulizer solution Inhale 3 mLs into the lungs every 6 hours as needed for Shortness of Breath DX: J45.40 2/6/19   BIJAN Calix   Multiple Vitamins-Minerals (THERAPEUTIC MULTIVITAMIN-MINERALS) tablet Take 1 tablet by mouth daily    Historical Provider, MD   solifenacin (VESICARE) 5 MG tablet Take 1 tablet by mouth daily. 6/25/13 10/29/13  Buddy Cedillo MD   aspirin EC 81 MG EC tablet Take 1 tablet by mouth daily. Start taking next week. Indications: OTC 2/25/13   Cyn Bazzi MD       Future Appointments   Date Time Provider Katy Escobaristi   3/22/2022  2:30 PM MD BIGG Kevin [facility] [place] - D[facility]D   5/24/2022  8:15 AM MD Maria Teresa [facility]   6/29/2022  9:30 AM MHC CT MAIN [facility]   6/29/2022 10:15 AM MD JEFFERSON Lauren PULM MMA   11/30/2022 10:00 AM Felicia Bridges APRN - [facility]     ,   Diabetes Assessment    Medic Alert ID: No  Meal Planning: Avoidance of concentrated sweets   How often do you test your blood sugar?: Daily (Comment: -125)   Do you have barriers with adherence to non-pharmacologic self-management interventions?  (Nutrition/Exercise/Self-Monitoring): No   Have you ever had to go to the ED for symptoms of low blood sugar?: No       No patient-reported symptoms      ,   Congestive Heart Failure Assessment    Are you currently restricting fluids?:  (Comment: 64 oz per day)  Do you understand a low sodium diet?: Yes  Do you understand how to read food labels?: Yes  How many restaurant meals do you eat per week?: 0  Do you salt your food before tasting it?: No     No patient-reported symptoms      Symptoms:     Symptom course: stable  Patient-reported weight (lb): 255     ,   COPD Assessment    Does the patient understand envrionmental exposure?: Yes  Is the patient able to verbalize Rescue vs. Long Acting medications?: Yes  Does the patient have a nebulizer?: Yes  Does the patient use a space with inhaled medications?: Yes     No patient-reported symptoms         Symptoms:         and   General Assessment    Do you have any symptoms that are causing concern?: No

## 2022-03-02 RX ORDER — NIFEDIPINE 90 MG/1
TABLET, EXTENDED RELEASE ORAL
Qty: 90 TABLET | Refills: 1 | Status: SHIPPED | OUTPATIENT
Start: 2022-03-02 | End: 2022-07-19

## 2022-03-02 RX ORDER — DULOXETIN HYDROCHLORIDE 60 MG/1
CAPSULE, DELAYED RELEASE ORAL
Qty: 90 CAPSULE | Refills: 1 | Status: SHIPPED | OUTPATIENT
Start: 2022-03-02

## 2022-03-02 RX ORDER — METOPROLOL TARTRATE 50 MG/1
TABLET, FILM COATED ORAL
Qty: 180 TABLET | Refills: 1 | Status: ON HOLD | OUTPATIENT
Start: 2022-03-02 | End: 2022-07-19 | Stop reason: SDUPTHER

## 2022-03-02 RX ORDER — FUROSEMIDE 40 MG/1
TABLET ORAL
Qty: 45 TABLET | Refills: 1 | Status: ON HOLD | OUTPATIENT
Start: 2022-03-02 | End: 2022-07-19 | Stop reason: SDUPTHER

## 2022-03-02 NOTE — TELEPHONE ENCOUNTER
Refill request for metoprolol  medication.      Name of [address] [place] [address]       Last visit - 11-     Pending visit - 3-29-22    Last refill -11-3-2021      Medication Contract signed -   Last Josh haywood-         Additional Comments

## 2022-03-02 NOTE — TELEPHONE ENCOUNTER
Refill Request     Last Seen: Last Seen Department: 11/29/2021  Last Seen by PCP: 11/29/2021    Last Written: 11/29/2021, #90, 1 refill    Next Appointment:   Future Appointments   Date Time Provider Katy Ade   3/29/2022 10:30 AM MD BIGG Campbell  [place] - DYD   5/24/2022  8:15 [facility]   6/29/2022  9:30 [facility]   6/29/2022 10:15 [facility]   11/30/2022 10:00 AM Kari Ramírez, APRN - CNP [facility]       Future appointment scheduled      Requested Prescriptions     Pending Prescriptions Disp Refills    NIFEdipine (PROCARDIA XL) 90 MG extended release tablet [Pharmacy Med Name: NIFEDIPINE ER 90 MG Tablet Extended Release 24 Hour] 90 tablet 1     Sig: TAKE 1 TABLET EVERY DAY

## 2022-03-30 ENCOUNTER — OFFICE VISIT (OUTPATIENT)
Dept: FAMILY MEDICINE CLINIC | Age: 82
End: 2022-03-30
Payer: MEDICARE

## 2022-03-30 VITALS — DIASTOLIC BLOOD PRESSURE: 70 MMHG | OXYGEN SATURATION: 93 % | HEART RATE: 65 BPM | SYSTOLIC BLOOD PRESSURE: 124 MMHG

## 2022-03-30 DIAGNOSIS — D64.9 ANEMIA, UNSPECIFIED TYPE: Primary | ICD-10-CM

## 2022-03-30 DIAGNOSIS — H92.01 DISCOMFORT OF RIGHT EAR: ICD-10-CM

## 2022-03-30 DIAGNOSIS — R22.1 LOCALIZED SWELLING, MASS AND LUMP, NECK: ICD-10-CM

## 2022-03-30 DIAGNOSIS — H91.93 DECREASED HEARING OF BOTH EARS: ICD-10-CM

## 2022-03-30 PROCEDURE — 1036F TOBACCO NON-USER: CPT | Performed by: NURSE PRACTITIONER

## 2022-03-30 PROCEDURE — G8427 DOCREV CUR MEDS BY ELIG CLIN: HCPCS | Performed by: NURSE PRACTITIONER

## 2022-03-30 PROCEDURE — G8400 PT W/DXA NO RESULTS DOC: HCPCS | Performed by: NURSE PRACTITIONER

## 2022-03-30 PROCEDURE — 1090F PRES/ABSN URINE INCON ASSESS: CPT | Performed by: NURSE PRACTITIONER

## 2022-03-30 PROCEDURE — 4040F PNEUMOC VAC/ADMIN/RCVD: CPT | Performed by: NURSE PRACTITIONER

## 2022-03-30 PROCEDURE — G8417 CALC BMI ABV UP PARAM F/U: HCPCS | Performed by: NURSE PRACTITIONER

## 2022-03-30 PROCEDURE — 1123F ACP DISCUSS/DSCN MKR DOCD: CPT | Performed by: NURSE PRACTITIONER

## 2022-03-30 PROCEDURE — G8484 FLU IMMUNIZE NO ADMIN: HCPCS | Performed by: NURSE PRACTITIONER

## 2022-03-30 PROCEDURE — 99213 OFFICE O/P EST LOW 20 MIN: CPT | Performed by: NURSE PRACTITIONER

## 2022-03-30 RX ORDER — CETIRIZINE HYDROCHLORIDE 10 MG/1
5 TABLET ORAL DAILY
Qty: 15 TABLET | Refills: 0 | Status: SHIPPED | OUTPATIENT
Start: 2022-03-30 | End: 2022-04-28 | Stop reason: SDUPTHER

## 2022-03-30 NOTE — PROGRESS NOTES
3/29/2022  Anthony Sherman (: 1940)  80 y.o.    ASSESSMENT and PLAN:  Amanda Nowak was seen today for ear problem. Diagnoses and all orders for this visit:    Anemia, unspecified type  -Recheck labs. -On anticoagulation, denies s/sx of bleeding. Discomfort of right ear  -     CBC with Auto Differential; Future  -likely related to allergies, no obvious signs of infection on exam  -Referred for hearing test.   -Alarm symptoms discussed.   -Water irrigation to wesly. Ears performed, relief noted, TM visible-normal.     Decreased hearing of both ears  -     Laura - Yoselin Fletcher [place]. D., Audiology, [facility]  -     CBC with Auto Differential; Future  -Recommend hearing evaluation.   -alarm symptoms discussed. Localized swelling, mass and lump, neck  -      US HEAD NECK SOFT TISSUE THYROID; Future  -Soft-non-tender-Palpable small mass on right side of neck.  -imaging to assess     Return in about 3 months (around 2022), or if symptoms worsen or fail to improve. HPI    Right ear discomfort, bilateral tinnitus; Denies fevers, chills, body aches. Hearing decreased bilaterally. Feels like has wax build up. Has not had hearing test. Denies drainage from ear. On Lasix. Hx of Parkinson's. Feels like there is mass near right side of throat. Non-painful. Not changing. No difficultly swallowing or breathing, but would like to have assessed further. Appetite is still not great. Denies blood or black tarry stools. On Eliquis. CKD-follows with Dr. Ynes Lucas    Htn/chf/a.fib-stable. Denies any lows. Denies sob, cp, palpitations, lightheadedness/dizziness. on lasix; home weights stable. Checks daily. Home log reviewed. Edema at baseline. hld-due for lab check 2022    Follows with nephro, cardio, pulm    Review of Systems   Constitutional: Negative for activity change, appetite change, fatigue, fever and unexpected weight change. HENT: Positive for ear pain and tinnitus.  Negative for trouble swallowing. Respiratory: Negative for cough, chest tightness, shortness of breath and wheezing. Cardiovascular: Negative for chest pain, palpitations and leg swelling. Gastrointestinal: Negative for constipation, diarrhea, nausea and vomiting. Genitourinary: Negative. Negative for difficulty urinating and dysuria. Musculoskeletal: Negative. Negative for gait problem. Neurological: Negative. Negative for dizziness, syncope, weakness, light-headedness, numbness and headaches. Psychiatric/Behavioral: Negative. Allergies, past medical history, family history, and social history reviewed and unchanged from previous encounter.      Current Outpatient Medications   Medication Sig Dispense Refill    DULoxetine (CYMBALTA) 60 MG extended release capsule TAKE 1 CAPSULE EVERY DAY 90 capsule 1    furosemide (LASIX) 40 MG tablet TAKE 1/2 TABLET EVERY DAY 45 tablet 1    NIFEdipine (PROCARDIA XL) 90 MG extended release tablet TAKE 1 TABLET EVERY DAY 90 tablet 1    metoprolol tartrate (LOPRESSOR) 50 MG tablet TAKE 1 TABLET TWICE DAILY 180 tablet 1    fluticasone-vilanterol (BREO ELLIPTA) 100-25 MCG/INH AEPB inhaler INHALE 1 PUFF EVERY DAY 3 each 3    atorvastatin (LIPITOR) 40 MG tablet TAKE 1 AND 1/2 TABLETS EVERY  tablet 3    zinc oxide 16 % OINT ointment Apply topically 4 times daily as needed for Dry Skin 50 g 1    montelukast (SINGULAIR) 10 MG tablet TAKE 1 TABLET EVERY DAY 90 tablet 1    propafenone (RYTHMOL) 150 MG tablet TAKE 1 TABLET BY MOUTH EVERY 8 HOURS 270 tablet 3    apixaban (ELIQUIS) 2.5 MG TABS tablet Take 1 tablet by mouth 2 times daily 180 tablet 3    valsartan (DIOVAN) 160 MG tablet Take 160 mg by mouth daily      albuterol sulfate  (90 Base) MCG/ACT inhaler INHALE 2 PUFFS INTO THE LUNGS EVERY 6 HOURS AS NEEDED FOR WHEEZING 54 g 3    ferrous sulfate (IRON 325) 325 (65 Fe) MG tablet Take 325 mg by mouth 2 times daily       fluticasone (FLONASE) 50 MCG/ACT nasal spray USE 2 PUFFS IN EACH NOSTRIL DAILY 3 Bottle 10    carbidopa-levodopa (SINEMET)  MG per tablet Take 1 tablet by mouth 4 times daily      Alpha-Lipoic Acid 300 MG TABS Take by mouth daily      lamoTRIgine (LAMICTAL) 25 MG tablet Take 25 mg by mouth 2 times daily 2 tabs BID      Handicap Placard MISC by Does not apply route DX: G20  parkinson's disease  Exp: 9/1/2024 1 each 0    ipratropium-albuterol (DUONEB) 0.5-2.5 (3) MG/3ML SOLN nebulizer solution Inhale 3 mLs into the lungs every 6 hours as needed for Shortness of Breath DX: J45.40 360 mL 1    Multiple Vitamins-Minerals (THERAPEUTIC MULTIVITAMIN-MINERALS) tablet Take 1 tablet by mouth daily      aspirin EC 81 MG EC tablet Take 1 tablet by mouth daily. Start taking next week. Indications: OTC 30 tablet 2     No current facility-administered medications for this visit. Vitals:    03/30/22 1340   BP: 124/70   Site: Right Lower Arm   Position: Sitting   Cuff Size: Large Adult   Pulse: 65   SpO2: 93%       Physical Exam  Vitals reviewed. Constitutional:       Appearance: Normal appearance. HENT:      Head: Normocephalic and atraumatic. Right Ear: Tympanic membrane and ear canal normal. There is impacted cerumen. Left Ear: Tympanic membrane, ear canal and external ear normal.      Nose: Nose normal.   Eyes:      Conjunctiva/sclera: Conjunctivae normal.   Cardiovascular:      Rate and Rhythm: Normal rate and regular rhythm. Pulses: Normal pulses. Heart sounds: Normal heart sounds. Pulmonary:      Effort: Pulmonary effort is normal.      Breath sounds: Normal breath sounds. Abdominal:      General: Abdomen is flat. Bowel sounds are normal.      Palpations: Abdomen is soft. Tenderness: There is no abdominal tenderness. There is no guarding. Musculoskeletal:         General: No signs of injury. Normal range of motion. Cervical back: Normal range of motion and neck supple. Right lower leg: Edema present. Left lower leg: Edema present. Skin:     General: Skin is warm and dry. Capillary Refill: Capillary refill takes less than 2 seconds. Neurological:      General: No focal deficit present. Mental Status: She is alert and oriented to person, place, and time. Mental status is at baseline. Psychiatric:         Mood and Affect: Mood normal.         Behavior: Behavior normal.         Thought Content:  Thought content normal.         Judgment: Judgment normal.

## 2022-04-01 ENCOUNTER — HOSPITAL ENCOUNTER (OUTPATIENT)
Age: 82
Discharge: HOME | End: 2022-04-01
Payer: MEDICARE

## 2022-04-01 LAB
BASOPHILS ABSOLUTE: 0.1 K/UL (ref 0–0.2)
BASOPHILS RELATIVE PERCENT: 0.4 %
EOSINOPHILS ABSOLUTE: 0.4 K/UL (ref 0–0.6)
EOSINOPHILS RELATIVE PERCENT: 3.1 %
FERRITIN: 72.8 NG/ML (ref 15–150)
HCT VFR BLD CALC: 31.2 % (ref 36–48)
HEMOGLOBIN: 9.9 G/DL (ref 12–16)
LYMPHOCYTES ABSOLUTE: 1.7 K/UL (ref 1–5.1)
LYMPHOCYTES RELATIVE PERCENT: 12.8 %
MCH RBC QN AUTO: 27.7 PG (ref 26–34)
MCHC RBC AUTO-ENTMCNC: 31.7 G/DL (ref 31–36)
MCV RBC AUTO: 87.4 FL (ref 80–100)
MONOCYTES ABSOLUTE: 1.1 K/UL (ref 0–1.3)
MONOCYTES RELATIVE PERCENT: 7.9 %
NEUTROPHILS ABSOLUTE: 10.3 K/UL (ref 1.7–7.7)
NEUTROPHILS RELATIVE PERCENT: 75.8 %
PARATHYROID HORMONE INTACT: 13.3 PG/ML (ref 14–72)
PDW BLD-RTO: 14.9 % (ref 12.4–15.4)
PLATELET # BLD: 404 K/UL (ref 135–450)
PMV BLD AUTO: 8.5 FL (ref 5–10.5)
RBC # BLD: 3.58 M/UL (ref 4–5.2)
VITAMIN D 25-HYDROXY: 47 NG/ML
WBC # BLD: 13.6 K/UL (ref 4–11)

## 2022-04-01 PROCEDURE — 83970 ASSAY OF PARATHORMONE: CPT

## 2022-04-01 PROCEDURE — 82728 ASSAY OF FERRITIN: CPT

## 2022-04-01 PROCEDURE — 82306 VITAMIN D 25 HYDROXY: CPT

## 2022-04-01 PROCEDURE — 36415 COLL VENOUS BLD VENIPUNCTURE: CPT

## 2022-04-01 PROCEDURE — 85025 COMPLETE CBC W/AUTO DIFF WBC: CPT

## 2022-04-05 ENCOUNTER — HOSPITAL ENCOUNTER (OUTPATIENT)
Dept: ULTRASOUND IMAGING | Age: 82
Discharge: HOME | End: 2022-04-05
Payer: MEDICARE

## 2022-04-05 DIAGNOSIS — R22.1 LOCALIZED SWELLING, MASS AND LUMP, NECK: ICD-10-CM

## 2022-04-05 PROCEDURE — 76536 US EXAM OF HEAD AND NECK: CPT

## 2022-04-07 ENCOUNTER — HOSPITAL ENCOUNTER (OUTPATIENT)
Age: 82
Discharge: HOME | End: 2022-04-07
Payer: MEDICARE

## 2022-04-07 ENCOUNTER — PROCEDURE VISIT (OUTPATIENT)
Dept: AUDIOLOGY | Age: 82
End: 2022-04-07
Payer: MEDICARE

## 2022-04-07 DIAGNOSIS — H93.13 TINNITUS, BILATERAL: ICD-10-CM

## 2022-04-07 DIAGNOSIS — H90.3 SENSORINEURAL HEARING LOSS, BILATERAL: Primary | ICD-10-CM

## 2022-04-07 LAB
ALBUMIN SERPL-MCNC: 4 G/DL (ref 3.4–5)
ANION GAP SERPL CALCULATED.3IONS-SCNC: 15 MMOL/L (ref 3–16)
BUN BLDV-MCNC: 28 MG/DL (ref 7–20)
CALCIUM SERPL-MCNC: 10.6 MG/DL (ref 8.3–10.6)
CHLORIDE BLD-SCNC: 102 MMOL/L (ref 99–110)
CO2: 24 MMOL/L (ref 21–32)
CREAT SERPL-MCNC: 2 MG/DL (ref 0.6–1.2)
GFR AFRICAN AMERICAN: 29
GFR NON-AFRICAN AMERICAN: 24
GLUCOSE BLD-MCNC: 105 MG/DL (ref 70–99)
PHOSPHORUS: 4.9 MG/DL (ref 2.5–4.9)
POTASSIUM SERPL-SCNC: 5.7 MMOL/L (ref 3.5–5.1)
SODIUM BLD-SCNC: 141 MMOL/L (ref 136–145)

## 2022-04-07 PROCEDURE — 92567 TYMPANOMETRY: CPT | Performed by: AUDIOLOGIST

## 2022-04-07 PROCEDURE — 36415 COLL VENOUS BLD VENIPUNCTURE: CPT

## 2022-04-07 PROCEDURE — 92557 COMPREHENSIVE HEARING TEST: CPT | Performed by: AUDIOLOGIST

## 2022-04-07 PROCEDURE — 80069 RENAL FUNCTION PANEL: CPT

## 2022-04-07 NOTE — PROGRESS NOTES
Erwin Marte   1940, 80 y.o. female   [id number]       Referring Provider: MAHAD Sterling CNP  Referral Type: In an order in [address] [place] [address]    Reason for Visit: Evaluation of suspected change in hearing and tinnitus. ADULT AUDIOLOGIC EVALUATION      Erwin Marte is a 80 y.o. female seen today, 4/7/2022 , for an initial audiologic evaluation. Patient was accompanied by     [address] [place] [address] HISTORY:      Erwin Marte noted decreased hearing and tinnitus, bilaterally with right ear worse compared to left. She notes hearing loss in right ear has been worse than left for several years with gradual decline. Patient also reports family history of hearing loss. No additional significant otologic or medical history was reported. Eriwn Marte denied otalgia, aural fullness, otorrhea, dizziness, imbalance, history of falls, history of occupational/recreational noise exposure, history of head trauma and history of ear surgery. Date: 4/7/2022     IMPRESSIONS:      Today' s results revealed a symmetric sensorineural hearing loss with excellent word recognition, bilaterally. Asymmetries > 10 dBHL noted from 250-750Hz with right ear worse compared to left. Hearing loss significant enough to create hearing difficulty in at least some listening situations. Discussed benefits of amplification pending medical clearance. Recommended consult with ENT physician due to noted asymmetries. Patient is scheduled to see Pita Vazquez DO on 05/03/2022. ASSESSMENT AND FINDINGS:     Otoscopy revealed: Clear ear canals bilaterally    RIGHT EAR:  Hearing Sensitivity: Moderately-severe rising to mild sensorineural hearing loss through 2kHz sloping to a moderate to severe sensorineural hearing loss through [facility]. Speech Recognition Threshold: 25 dB HL  Word Recognition: Excellent (96%), based on NU-6 25-word list at 75 dBHL using recorded speech stimuli.     Tympanometry: Normal peak pressure and compliance, Type A tympanogram, consistent with normal middle ear function. Acoustic Reflexes: Ipsilateral: Could not maintain seal. Contralateral: Could not maintain seal.      LEFT EAR:  Hearing Sensitivity:Mild sloping to severe sensorineural hearing loss. Speech Recognition Threshold: 35 dB HL  Word Recognition: Excellent (92%), based on NU-6 25-word list at 80 dBHL masked using recorded speech stimuli. Tympanometry: Normal peak pressure and compliance, Type A tympanogram, consistent with normal middle ear function. Acoustic Reflexes: Ipsilateral: Could not maintain seal. Contralateral: Could not maintain seal.      Reliability: Good   Transducer: Inserts    **NOTE: Asymmetries > 10 dBHL noted from 250-750Hz with right ear worse compared to left. See scanned audiogram dated 4/7/2022  for results. PATIENT EDUCATION:       The following items were discussed with the patient:   - Good Communication Strategies  - Hearing Loss and Hearing Aids  - Tinnitus Management Strategies      Educational information was shared in the After Visit Summary. RECOMMENDATIONS:                                                                                                                                                                                                                                                            The following items are recommended based on patient report and results from today's appointment:   - Continue medical follow-up with MAHAD Lombardo CNP   - Retest hearing as medically indicated and/or sooner if a change in hearing is noted. - If desired, schedule a Hearing Aid Evaluation (HAE) appointment to discuss hearing aid options pending medical clearance. - Utilize \"Good Communication Strategies\" as discussed to assist in speech understanding with communication partners.   - Maintain a sound enriched environment to assist in the management of tinnitus symptoms.        Lit Hooker  Audiologist    Chart CC'd to: Freda Larry, APRN - CNP      Degree of   Hearing Sensitivity dB Range   Within Normal Limits (WNL) 0 - 20   Mild 20 - 40   Moderate 40 - 55   Moderately-Severe 55 - 70   Severe 70 - 90   Profound 90 +

## 2022-04-07 NOTE — Clinical Note
Jose Juan Kuhn you for your referral for audiometric testing on this patient. Today' s results revealed a symmetric sensorineural hearing loss with excellent word recognition, bilaterally. Asymmetries > 10 dBHL noted from 250-750Hz with right ear worse compared to left. Hearing loss significant enough to create hearing difficulty in at least some listening situations. Discussed benefits of amplification pending medical clearance. Recommended consult with ENT physician due to noted asymmetries. Patient is scheduled to see Rusty Jaime DO on 05/03/2022. Please see the scanned audiogram (under \"Media\" tab) and encounter note for details. If you have any questions, or if there is anything else you need, please let me know.       Nhung Lopez  Audiologist  ---  [facility] ENT - Audiology

## 2022-04-07 NOTE — PATIENT INSTRUCTIONS
Good Communication Strategies    Communication can be challenging for anyone, but can be especially difficult for those with some degree of hearing loss. While we may not be able to control every factor that may lead to difficulty with communication, there are Good Communication Strategies that we can all use in our day-to-day lives. Communication takes both parties working together for it to be successful. Tips as a Listener:   1. Control your environment. It is important to limit the amount of background noise in the room when possible. You should also consider having a good light source in the room to best see the other person. 2. Ask for clarification. Instead of saying \"What?\", you can use parts of what you heard to make a new question. For example, if you heard the word \"Thursday\" but not the rest of the week, you may ask \"What was that about Thursday? \" or \"What did you want to do Thursday? \". This shows the person talking that you are listening and will help them better explain what they are saying. 3. Be an advocate for yourself. If you are hearing but not understanding, tell the other person \"I can hear you, but I need you to slow down when you speak. \"  Or if someone is facing the other direction, say \"I cannot hear you when you are not looking at me when we talk. \"       Tips as a Talker:   - Sit or stand 3 to 6 feet away to maximize audibility         -- It is unrealistic to believe someone else will fully hear your message if you are speaking from across the room or in a different room in the house   - Stay at eye level to help with visual cues   - Make sure you have the persons attention before speaking   - Use facial expressions and gestures to accentuate your message   - Raise your voice slightly (do not scream)   - Speak slowly and distinctly   - Use short, simple sentences   - Rephrase your words if the person is having a hard time understanding you    - To avoid distortion, dont speak directly into a persons ear      Some additional items that may be helpful:   - Remain patient - this is important for both parties   - Write down items that still cannot be heard/understood. You may write with pen/paper or consider typing/texting on a cell phone or smart device. - If background noise is unavoidable, try to keep yourself in a good position in the room. By sitting at a fenton on the side of the restaurant (preferably a corner), it will be easier to communicate than if you were sitting at a table in the middle with background noise surrounding you. Keep yourself positioned away from music speakers or heavy foot traffic. Good Communication Strategies    Communication can be challenging for anyone, but can be especially difficult for those with some degree of hearing loss. While we may not be able to control every factor that may lead to difficulty with communication, there are Good Communication Strategies that we can all use in our day-to-day lives. Communication takes both parties working together for it to be successful. Tips as a Listener:   4. Control your environment. It is important to limit the amount of background noise in the room when possible. You should also consider having a good light source in the room to best see the other person. 5. Ask for clarification. Instead of saying \"What?\", you can use parts of what you heard to make a new question. For example, if you heard the word \"Thursday\" but not the rest of the week, you may ask \"What was that about Thursday? \" or \"What did you want to do Thursday? \". This shows the person talking that you are listening and will help them better explain what they are saying. 6. Be an advocate for yourself. If you are hearing but not understanding, tell the other person \"I can hear you, but I need you to slow down when you speak. \"  Or if someone is facing the other direction, say \"I cannot hear you when you are not looking at me when we talk.\"       Tips as a Talker:   - Sit or stand 3 to 6 feet away to maximize audibility         -- It is unrealistic to believe someone else will fully hear your message if you are speaking from across the room or in a different room in the house   - Stay at eye level to help with visual cues   - Make sure you have the persons attention before speaking   - Use facial expressions and gestures to accentuate your message   - Raise your voice slightly (do not scream)   - Speak slowly and distinctly   - Use short, simple sentences   - Rephrase your words if the person is having a hard time understanding you    - To avoid distortion, dont speak directly into a persons ear      Some additional items that may be helpful:   - Remain patient - this is important for both parties   - Write down items that still cannot be heard/understood. You may write with pen/paper or consider typing/texting on a cell phone or smart device. - If background noise is unavoidable, try to keep yourself in a good position in the room. By sitting at a fenton on the side of the restaurant (preferably a corner), it will be easier to communicate than if you were sitting at a table in the middle with background noise surrounding you. Keep yourself positioned away from music speakers or heavy foot traffic. Tinnitus: Overview and Management Strategies          Many people have some ringing sounds in their ears once in a while. You may hear a roar, a hiss, a tinkle, or a buzz. The sound usually lasts only a few minutes. If it goes on all the time, you may have tinnitus. Tinnitus is usually caused by long-term exposure to loud noise. This damages the nerves in the inner ear. It can occur with all types of hearing loss. It may be a symptom of almost any ear problem. Tinnitus may be caused by a buildup of earwax. Or, it may be caused by ear infections or certain medicines (especially antibiotics or large amounts of aspirin).  You can also hear noises in your ears because of an injury to the ears, drinking too much alcohol or caffeine, or a medical condition. Other conditions may also contribute to tinnitus, including: head and neck trauma, temporomandibular joint disorder (TMJ), sinus pressure and barometric trauma, traumatic brain injury, metabolic disorders, autoimmune disorders, stress, and high blood pressure. You may need tests to evaluate your hearing and to find causes of long-lasting tinnitus. Your doctor may suggest one or more treatments to help you cope with the tinnitus. You can also do things at home to help reduce symptoms. Follow-up care is a key part of your treatment and safety. Be sure to make and go to all appointments, and call your doctor if you are having problems. It's also a good idea to know your test results and keep a list of the medicines you take. How can you care for yourself at home? · Limit or cut out alcohol, caffeine, and sodium. They can make your symptoms worse. · Do not smoke or use other tobacco products. Nicotine reduces blood flow to the ear and makes tinnitus worse. If you need help quitting, talk to your doctor about stop-smoking programs and medicines. These can increase your chances of quitting for good. · Talk to your doctor about whether to stop taking aspirin and similar products such as ibuprofen or naproxen. · Get exercise often. It can help improve blood flow to the ear. Ways to manage/cope with tinnitus  Some tinnitus may last a long time. To manage your tinnitus, try to:  · Avoid noises that you think caused your tinnitus. If you can't avoid loud noises, wear earplugs or earmuffs. · Ignore the sound by paying attention to other things. Keeping your brain busy with other tasks or background noise can help your brain not focus on the tinnitus. · Try to not give the tinnitus an emotional reaction. Do your best to ignore the sound and not let it bother you.  Relax using biofeedback, meditation, or yoga. Feeling stressed and being tired can make tinnitus worse. · Play music or white noise to help you sleep. Background noise may cover up the noise that you hear in your ears. You can buy a tabletop machine or a device that sits under your pillow to play soothing sounds, like ocean waves. · Smart phones have free apps, such as Whist, Relax Melodies, ReSound Relief, and White Noise Lite. These apps have different types of sounds/noise, some of which you can blend together to find sounds that are most soothing to you. · Hearing aid technology, especially when there is some hearing loss, may help reduce tinnitus symptoms by giving your brain better access to the sounds it is missing. There are some hearing aids with built-in noise generator programs, which may help when amplification alone is not enough. Additional resources may be found through the [employer] at www.polly.org    When should you call for help? Call 911 anytime you think you may need emergency care. For example, call if:    · You have symptoms of a stroke. These may include:  ? Sudden numbness, tingling, weakness, or loss of movement in your face, arm, or leg, especially on only one side of your body. ? Sudden vision changes. ? Sudden trouble speaking. ? Sudden confusion or trouble understanding simple statements. ? Sudden problems with walking or balance. ? A sudden, severe headache that is different from past headaches. Call your doctor now or seek immediate medical care if:    · You develop other symptoms. These may include hearing loss (or worse hearing loss), balance problems, dizziness, nausea, or vomiting. Watch closely for changes in your health, and be sure to contact your doctor if:    · Your tinnitus moves from both ears to one ear. · Your hearing loss gets worse within 1 day after an ear injury.      · Your tinnitus or hearing loss does not get better within 1 week after an ear injury. · Your tinnitus bothers you enough that you want to take medicines to help you cope with it. If you notice changes in your tinnitus and/or your hearing, it is recommended that you have your hearing tested by your audiologist and to follow-up with your physician that manages your hearing loss (such as your ENT or Primary Care doctor). Hearing Loss: Care Instructions  Your Care Instructions      Hearing loss is a sudden or slow decrease in how well you hear. It can range from mild to profound. Permanent hearing loss can occur with aging, and it can happen when you are exposed long-term to loud noise. Examples include listening to loud music, riding motorcycles, or being around other loud machines. Hearing loss can affect your work and home life. It can make you feel lonely or depressed. You may feel that you have lost your independence. But hearing aids and other devices can help you hear better and feel connected to others. Follow-up care is a key part of your treatment and safety. Be sure to make and go to all appointments, and call your doctor if you are having problems. It's also a good idea to know your test results and keep a list of the medicines you take. How can you care for yourself at home? · Avoid loud noises whenever possible. This helps keep your hearing from getting worse. Always wear hearing protection around loud noises. · If appropriate, wear hearing aid(s) as directed. It is recommended that hearing aids are worn during all waking hours to keep your brain active and give it access to the sounds it is missing. · If you are beginning your process with hearing aid(s), schedule a \"Hearing Aid Evaluation\" with an audiologist to discuss your lifestyle, features of hearing aid technology, and styles of hearing aids available.   It is recommended that you contact your insurance company to determine if you have a hearing aid benefit, as this may dictate who you can see for these services. · Have hearing tests as your doctor suggests. They can show whether your hearing has changed. Your hearing aid may need to be adjusted. · Use other assistive devices as needed. These may include:  ? Telephone amplifiers and hearing aids that can connect to a television, stereo, radio, or microphone. ? Devices that use lights or vibrations. These alert you to the doorbell, a ringing telephone, or a baby monitor. ? Television closed-captioning. This shows the words at the bottom of the screen. Most new TVs can do this. ? TTY (text telephone). This lets you type messages back and forth on the telephone instead of talking or listening. These devices are also called TDD. When messages are typed on the keyboard, they are sent over the phone line to a receiving TTY. The message is shown on a monitor. · Use pagers, fax machines, text, and email if it is hard for you to communicate by telephone. · Try to learn a listening technique called speech-reading. It is not lip-reading. You pay attention to people's gestures, expressions, posture, and tone of voice. These clues can help you understand what a person is saying. Face the person you are talking to, and have him or her face you. Make sure the lighting is good. You need to see the other person's face clearly. · Think about counseling if you need help to adjust to your hearing loss. When should you call for help? Watch closely for changes in your health, and be sure to contact your doctor if:    · You think your hearing is getting worse. · You have new symptoms, such as dizziness or nausea.

## 2022-04-14 RX ORDER — ALBUTEROL SULFATE 90 UG/1
2 AEROSOL, METERED RESPIRATORY (INHALATION) EVERY 6 HOURS PRN
Qty: 3 EACH | Refills: 2 | Status: SHIPPED | OUTPATIENT
Start: 2022-04-14

## 2022-04-14 NOTE — TELEPHONE ENCOUNTER
Refill Request     Last Seen: Last Seen Department: 3/30/2022  Last Seen by PCP: Visit date not found    Last Written: 07/14/21  54g 3 refills     Next Appointment:   Future Appointments   Date Time Provider Katy Booker   5/3/2022  1:00 PM Lizette Low [facility]   5/3/2022  1:20 PM Margo Fletcher, AuD AND AUD[facility]   5/24/2022  8:15 AM MD Nba Izquierdo [facility]   6/29/2022  9:30 AM [facility]   6/29/2022 10:15 AM Guillermo Spencer MD [facility]   6/30/2022  1:30 PM MAHAD Perez [facility] - [facility]   11/30/2022 10:00 AM [place] Maria Alejandra, APRN - CNP [facility]       Future appointment scheduled      Requested Prescriptions     Pending Prescriptions Disp Refills    albuterol sulfate  (90 Base) MCG/ACT inhaler [Pharmacy Med Name: ALBUTEROL SULFATE  (90 Base) MCG/ACT Aerosol Solution] 3 each      Sig: INHALE 2 PUFFS INTO THE LUNGS EVERY 6 HOURS AS NEEDED FOR WHEEZING

## 2022-04-19 DIAGNOSIS — H91.8X9 ASYMMETRICAL HEARING LOSS: Primary | ICD-10-CM

## 2022-04-19 DIAGNOSIS — H93.13 TINNITUS OF BOTH EARS: ICD-10-CM

## 2022-04-21 ENCOUNTER — APPOINTMENT (OUTPATIENT)
Dept: GENERAL RADIOLOGY | Age: 82
End: 2022-04-21
Payer: MEDICARE

## 2022-04-21 ENCOUNTER — HOSPITAL ENCOUNTER (EMERGENCY)
Age: 82
Discharge: HOME | End: 2022-04-21
Attending: STUDENT IN AN ORGANIZED HEALTH CARE EDUCATION/TRAINING PROGRAM
Payer: MEDICARE

## 2022-04-21 ENCOUNTER — APPOINTMENT (OUTPATIENT)
Dept: CT IMAGING | Age: 82
End: 2022-04-21
Payer: MEDICARE

## 2022-04-21 VITALS
WEIGHT: 256 LBS | RESPIRATION RATE: 16 BRPM | HEIGHT: 66 IN | SYSTOLIC BLOOD PRESSURE: 163 MMHG | TEMPERATURE: 99.4 F | DIASTOLIC BLOOD PRESSURE: 134 MMHG | OXYGEN SATURATION: 98 % | BODY MASS INDEX: 41.14 KG/M2 | HEART RATE: 75 BPM

## 2022-04-21 DIAGNOSIS — M79.89 LEG SWELLING: ICD-10-CM

## 2022-04-21 DIAGNOSIS — R79.89 ELEVATED BRAIN NATRIURETIC PEPTIDE (BNP) LEVEL: ICD-10-CM
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
## 2022-04-21 LAB
A/G RATIO: 1.7 (ref 1.1–2.2)
ALBUMIN SERPL-MCNC: 3.8 G/DL (ref 3.4–5)
ALP BLD-CCNC: 113 U/L (ref 40–129)
ALT SERPL-CCNC: <5 U/L (ref 10–40)
ANION GAP SERPL CALCULATED.3IONS-SCNC: 10 MMOL/L (ref 3–16)
AST SERPL-CCNC: 10 U/L (ref 15–37)
BASE EXCESS VENOUS: 3.8 MMOL/L (ref -3–3)
BASOPHILS ABSOLUTE: 0.1 K/UL (ref 0–0.2)
BASOPHILS RELATIVE PERCENT: 0.4 %
BILIRUB SERPL-MCNC: 0.3 MG/DL (ref 0–1)
BUN BLDV-MCNC: 38 MG/DL (ref 7–20)
CALCIUM SERPL-MCNC: 9.5 MG/DL (ref 8.3–10.6)
CARBOXYHEMOGLOBIN: 0.6 % (ref 0–1.5)
CHLORIDE BLD-SCNC: 99 MMOL/L (ref 99–110)
CO2: 30 MMOL/L (ref 21–32)
CREAT SERPL-MCNC: 2.3 MG/DL (ref 0.6–1.2)
EKG ATRIAL RATE: 71 BPM
EKG DIAGNOSIS: NORMAL
EKG P-R INTERVAL: 164 MS
EKG Q-T INTERVAL: 406 MS
EKG QRS DURATION: 116 MS
EKG QTC CALCULATION (BAZETT): 441 MS
EKG R AXIS: 55 DEGREES
EKG T AXIS: 76 DEGREES
EKG VENTRICULAR RATE: 71 BPM
EOSINOPHILS ABSOLUTE: 0.3 K/UL (ref 0–0.6)
EOSINOPHILS RELATIVE PERCENT: 2.2 %
GFR AFRICAN AMERICAN: 25
GFR NON-AFRICAN AMERICAN: 20
GLUCOSE BLD-MCNC: 109 MG/DL (ref 70–99)
HCO3 VENOUS: 29.6 MMOL/L (ref 23–29)
HCT VFR BLD CALC: 26.1 % (ref 36–48)
HEMOGLOBIN: 8.5 G/DL (ref 12–16)
LYMPHOCYTES ABSOLUTE: 0.9 K/UL (ref 1–5.1)
LYMPHOCYTES RELATIVE PERCENT: 6.8 %
MCH RBC QN AUTO: 28.2 PG (ref 26–34)
MCHC RBC AUTO-ENTMCNC: 32.5 G/DL (ref 31–36)
MCV RBC AUTO: 86.8 FL (ref 80–100)
METHEMOGLOBIN VENOUS: 0.3 %
MONOCYTES ABSOLUTE: 1 K/UL (ref 0–1.3)
MONOCYTES RELATIVE PERCENT: 7.8 %
NEUTROPHILS ABSOLUTE: 10.7 K/UL (ref 1.7–7.7)
NEUTROPHILS RELATIVE PERCENT: 82.8 %
O2 CONTENT, VEN: 12 VOL %
O2 SAT, VEN: 91 %
O2 THERAPY: ABNORMAL
PCO2, VEN: 51 MMHG (ref 40–50)
PDW BLD-RTO: 15 % (ref 12.4–15.4)
PH VENOUS: 7.38 (ref 7.35–7.45)
PLATELET # BLD: 294 K/UL (ref 135–450)
PMV BLD AUTO: 8.3 FL (ref 5–10.5)
PO2, VEN: 61.8 MMHG (ref 25–40)
POTASSIUM REFLEX MAGNESIUM: 5.1 MMOL/L (ref 3.5–5.1)
PRO-BNP: 1930 PG/ML (ref 0–449)
RBC # BLD: 3.01 M/UL (ref 4–5.2)
SODIUM BLD-SCNC: 139 MMOL/L (ref 136–145)
TCO2 CALC VENOUS: 31 MMOL/L
TOTAL PROTEIN: 6.1 G/DL (ref 6.4–8.2)
TROPONIN: <0.01 NG/ML
WBC # BLD: 12.9 K/UL (ref 4–11)

## 2022-04-21 PROCEDURE — 93005 ELECTROCARDIOGRAM TRACING: CPT | Performed by: STUDENT IN AN ORGANIZED HEALTH CARE EDUCATION/TRAINING PROGRAM

## 2022-04-21 PROCEDURE — 71045 X-RAY EXAM CHEST 1 VIEW: CPT

## 2022-04-21 PROCEDURE — 99285 EMERGENCY DEPT VISIT HI MDM: CPT

## 2022-04-21 PROCEDURE — 93010 ELECTROCARDIOGRAM REPORT: CPT | Performed by: INTERNAL MEDICINE

## 2022-04-21 PROCEDURE — 82803 BLOOD GASES ANY COMBINATION: CPT

## 2022-04-21 PROCEDURE — 72125 CT NECK SPINE W/O DYE: CPT

## 2022-04-21 PROCEDURE — 83880 ASSAY OF NATRIURETIC PEPTIDE: CPT

## 2022-04-21 PROCEDURE — 96374 THER/PROPH/DIAG INJ IV PUSH: CPT

## 2022-04-21 PROCEDURE — 72170 X-RAY EXAM OF PELVIS: CPT

## 2022-04-21 PROCEDURE — 70450 CT HEAD/BRAIN W/O DYE: CPT

## 2022-04-21 PROCEDURE — 84484 ASSAY OF TROPONIN QUANT: CPT

## 2022-04-21 PROCEDURE — 6360000002 HC RX W HCPCS: Performed by: EMERGENCY MEDICINE

## 2022-04-21 PROCEDURE — 36415 COLL VENOUS BLD VENIPUNCTURE: CPT

## 2022-04-21 PROCEDURE — 85025 COMPLETE CBC W/AUTO DIFF WBC: CPT

## 2022-04-21 PROCEDURE — 70486 CT MAXILLOFACIAL W/O DYE: CPT

## 2022-04-21 PROCEDURE — 80053 COMPREHEN METABOLIC PANEL: CPT

## 2022-04-21 RX ORDER — FUROSEMIDE 10 MG/ML
40 INJECTION INTRAMUSCULAR; INTRAVENOUS ONCE
Status: COMPLETED | OUTPATIENT
Start: 2022-04-21 | End: 2022-04-21

## 2022-04-21 RX ADMIN — FUROSEMIDE 40 MG: 10 INJECTION, SOLUTION INTRAMUSCULAR; INTRAVENOUS at 07:58

## 2022-04-21 ASSESSMENT — PAIN - FUNCTIONAL ASSESSMENT: PAIN_FUNCTIONAL_ASSESSMENT: NONE - DENIES PAIN

## 2022-04-21 NOTE — ED PROVIDER NOTES
Patient was signed out to me by the outgoing physician at 0700. Please see their note for the initial H&P, physical exam, medical decision making and management. Patient signed out pending CT and XR imaging results. Brief HPI:    This is a 42-year-old female who presents emergency department for evaluation of fall. Patient reportedly slid out of her recliner chair and attempting to use the bedside commode this morning. She personally has no complaints. She is at baseline oxygen requirements. She is on Eliquis. ED COURSE/MDM  Patient seen and evaluated. Old records reviewed. Imaging reviewed and results discussed with patient. Laboratory evaluation revealed mildly elevated BNP. On exam, no respiratory distress, saturating in the high 90s while at rest.  She has some mild lower extremity swelling. Renal function is near baseline. BNP is mildly elevated. Her low intermittent oxygen reported at home could be related to mild volume overload. She was given dose of Lasix 40 IV. Plan of care discussed with patient and family. I think that the extra dose of Lasix should help with her intermittent low oxygen and the mild volume overload otherwise patient does not require admission at this time. CT imaging without acute traumatic process. Chest x-ray without focal process, pneumonia. Patient and family in agreement with plan. Advised on strict return precautions. The patient will be discharged from the emergency department. The patient was counseled on their diagnosis and any medications prescribed. They were advised on the need for PCP followup. They were counseled on the need to return to the emergency department if any of their symptoms were to worsen, change or have any other concerns. Discharged in stable condition. CLINICAL IMPRESSION  1. Fall, initial encounter    2. Elevated brain natriuretic peptide (BNP) level    3.  Leg swelling        Blood pressure (!) 137/55, pulse 75, temperature 99.4 °F (37.4 °C), temperature source Oral, resp. rate 16, height 5' 6\" (1.676 m), weight 256 lb (116.1 kg), SpO2 98 %, not currently breastfeeding. DISPOSITION  Rudy Fulton was discharged to home in stable condition.       Zandra Orellana MD  04/21/22 [id number]       Zandra Orellana MD  04/21/22 [id number]

## 2022-04-22 ENCOUNTER — CARE COORDINATION (OUTPATIENT)
Dept: CARE COORDINATION | Age: 82
End: 2022-04-22

## 2022-04-22 ASSESSMENT — ENCOUNTER SYMPTOMS: DYSPNEA ASSOCIATED WITH: EXERTION

## 2022-04-22 NOTE — TELEPHONE ENCOUNTER
Yes, patient needs to be seen. Please schedule for Monday or Wednesday, can use blocked appointment if needed for this patient.

## 2022-04-22 NOTE — TELEPHONE ENCOUNTER
I scheduled this patient with Karine Morel for Thursday at 1:30 pm, no other opening were available.

## 2022-04-22 NOTE — CARE COORDINATION
Ambulatory Care Coordination Note  4/22/2022  CM Risk Score: 13  Charlson 10 Year Mortality Risk Score: 100%     ACC: Barney Bejarano RN    Summary Note: AC completed ED F/U call with patient , Leann Cardenas, who said patient is feeling better today. Leann Cardenas said patient had slid down her chair and noticed her breathing was labored, so he had life squad take her for eval at the ER. Leann Cardenas said patient was given Lasix IV to help diurese and d/c home. Leann Cardenas said he has been monitoring daily weights and does not notice much fluctuation. Leann Cardenas said patient gets maintenance PT weekly and it can increase to Bi-weekly if patient has noticed increase in weakness. Leann Cardenas was agreeable to ongoing calls to monitor patient symptoms. AC said she would alert Eliecer Brambila, who is covering for Dr. Gin Ho if a f/u appt is felt to be necessary. Leann Cardenas thanked [facility] at this time and had no further questions or concerns. Plan:    F/U call 1 week  F/U improvement on symptoms  Daily weight logs  Update DM assessment. Ambulatory Care Coordination Assessment    Care Coordination Protocol  Referral from Primary Care Provider: No  Week 1 - Initial Assessment     Do you have all of your prescriptions and are they filled?: Yes (Comment: Reviewed all 4/22/22 CJT)  Barriers to medication adherence: None  Are you able to afford your medications?: Yes  How often do you have trouble taking your medications the way you have been told to take them?: I always take them as prescribed. Do you have Home O2 Therapy?: Yes   Oxygen Regimen:  At night/Sleep Flow - Enter rate/FIO2: 2   Method of Delivery: Concentrater   CPAP Use: BiPAP      Ability to seek help/take action for Emergent Urgent situations i.e. fire, crime, inclement weather or health crisis.: Needs Assistance  Ability to ambulate to restroom: Needs Assistance  Ability handle personal hygeine needs (bathing/dressing/grooming): Needs Assistance  Ability to manage Medications: Needs Assistance  Ability to prepare Food Preparation: Dependent  Ability to maintain home (clean home, laundry): Dependent  Ability to drive and/or has transportation: Dependent  Ability to do shopping: Needs Assistance  Ability to manage finances: Dependent  Is patient able to live independently?: No     Current Housing: Private Residence           Frequent urination at night?: Yes  Do you use rails/bars?: Yes  Do you have a non-slip tub mat?: Yes     Are you experiencing loss of meaning?: No  Are you experiencing loss of hope and peace?: No     Suggested Interventions and Community Resources                  Prior to Admission medications    Medication Sig Start Date End Date Taking?  Authorizing Provider   albuterol sulfate  (90 Base) MCG/ACT inhaler INHALE 2 PUFFS INTO THE LUNGS EVERY 6 HOURS AS NEEDED FOR WHEEZING 4/14/22   MAHAD Briscoe CNP   cetirizine (ZYRTEC) 10 MG tablet Take 0.5 tablets by mouth daily 3/30/22 4/29/22  MAHAD Kiser CNP   furosemide (LASIX) 40 MG tablet TAKE 1/2 TABLET EVERY DAY 3/2/22   Eboni Car MD   NIFEdipine (PROCARDIA XL) 90 MG extended release tablet TAKE 1 TABLET EVERY DAY 3/2/22   MHAAD Villalta CNP   metoprolol tartrate (LOPRESSOR) 50 MG tablet TAKE 1 TABLET TWICE DAILY 3/2/22   MAHAD Kiser CNP   DULoxetine (CYMBALTA) 60 MG extended release capsule TAKE 1 CAPSULE EVERY DAY 3/2/22   MAHAD Kiser CNP   fluticasone-vilanterol (BREO ELLIPTA) 100-25 MCG/INH AEPB inhaler INHALE 1 PUFF EVERY DAY 12/22/21   MAHAD Briscoe CNP   atorvastatin (LIPITOR) 40 MG tablet TAKE 1 AND 1/2 TABLETS EVERY DAY 11/4/21   Eboni Car MD   zinc oxide 16 % OINT ointment Apply topically 4 times daily as needed for Dry Skin 11/3/21   MAHAD Kiser CNP   montelukast (SINGULAIR) 10 MG tablet TAKE 1 TABLET EVERY DAY 11/3/21   MAHAD Briscoe CNP   propafenone (RYTHMOL) 150 MG tablet TAKE 1 TABLET BY MOUTH EVERY 8 HOURS 11/3/21   Lina Erickson MD   apixaban [facility]) 2.5 MG TABS tablet Take 1 tablet by mouth 2 times daily 9/13/21   Gage Adkins MD   valsartan (DIOVAN) 160 MG tablet Take 160 mg by mouth daily    Historical Provider, MD   ferrous sulfate (IRON 325) 325 (65 Fe) MG tablet Take 325 mg by mouth 2 times daily  2/4/21 12/2/21  Historical Provider, MD   fluticasone (FLONASE) 50 MCG/ACT nasal spray USE 2 PUFFS IN EACH NOSTRIL DAILY 1/8/21   BIJAN Ryan   carbidopa-levodopa (SINEMET)  MG per tablet Take 1 tablet by mouth 4 times daily    Historical Provider, MD   Alpha-Lipoic Acid 300 MG TABS Take by mouth daily    Historical Provider, MD   lamoTRIgine (LAMICTAL) 25 MG tablet Take 25 mg by mouth 2 times daily 2 tabs BID    Historical Provider, MD   Handicap Placard MISC by Does not apply route DX: Tere Duarte  parkinson's disease  Exp: 9/1/2024 9/27/19   BIJAN Ryan   ipratropium-albuterol (DUONEB) 0.5-2.5 (3) MG/3ML SOLN nebulizer solution Inhale 3 mLs into the lungs every 6 hours as needed for Shortness of Breath DX: J45.40 2/6/19   BIJAN Ryan   Multiple Vitamins-Minerals (THERAPEUTIC MULTIVITAMIN-MINERALS) tablet Take 1 tablet by mouth daily    Historical Provider, MD   solifenacin (VESICARE) 5 MG tablet Take 1 tablet by mouth daily. 6/25/13 10/29/13  Ninoska Diaz MD   aspirin EC 81 MG EC tablet Take 1 tablet by mouth daily. Start taking next week.   Indications: OTC 2/25/13   Oz Gonzalez MD       Future Appointments   Date Time Provider Katy Booker   5/3/2022  1:00 PM DO Geraldo Jacobsen ENT [facility]   5/3/2022  1:20 PM Cristian Fletcher, [place] AND AUDIO [facility]   5/24/2022  8:15 AM MD Garima Parr [facility]   6/29/2022  9:30 AM MHC CT MAIN [facility] [place] [facility]   6/29/2022 10:15 AM MD JEFFERSON Williamson PULM [facility]   6/30/2022  1:30 PM Jerel Aschoff Pardekooper, APRN - CNP [facility]  Gloria KRISHNAN   11/30/2022 10:00 AM MAHAD Contreras CNP [facility]     ,   Diabetes Assessment    Medic Alert ID: No  Meal Planning: Avoidance of concentrated sweets   How often do you test your blood sugar?: Daily (Comment: -125)   Do you have barriers with adherence to non-pharmacologic self-management interventions?  (Nutrition/Exercise/Self-Monitoring): No   Have you ever had to go to the ED for symptoms of low blood sugar?: No          ,   Congestive Heart Failure Assessment    Are you currently restricting fluids?:  (Comment: 64 oz per day)  Do you understand a low sodium diet?: Yes  Do you understand how to read food labels?: Yes  How many restaurant meals do you eat per week?: 0  Do you salt your food before tasting it?: No     Shortness of breath (worse than baseline)      Symptoms:  CHF associated dyspnea on exertion: Pos, CHF associated shortness of breath: Pos      Symptom course: stable  Weight trend: stable     ,   COPD Assessment    Does the patient understand envrionmental exposure?: Yes  Is the patient able to verbalize Rescue vs. Long Acting medications?: Yes  Does the patient have a nebulizer?: Yes  Does the patient use a space with inhaled medications?: Yes     Shortness of breath (worse than baseline)         Symptoms:     Symptom course: stable  Breathlessness: exertion  Increase use of rapid acting/rescue inhaled medications?: Yes  Change in chronic cough?: No/At Baseline  Change in sputum?: No/At Baseline      and   General Assessment    Do you have any symptoms that are causing concern?: Yes  Progression since Onset: Intermittent - Waxing/Waning  Reported Symptoms: Weakness

## 2022-04-24 NOTE — ED PROVIDER NOTES
[address] ED      CHIEF COMPLAINT  Fall     HISTORY OF PRESENT ILLNESS  Saul Rucker is a 80 y.o. female with a past medical history of hypertension, coronary artery disease, paroxysmal atrial fibrillation, CHF, diabetes, COPD, and chronic kidney disease, who presents to the ED complaining of fall. Mechanism of injury: Patient had gone a early in the morning to use the bedside commode, while trying to get back into the recliner, she slid and fell on her bottom. She was unable to get up by herself.  called EMS who brought her in for evaluation. Patient denies hitting her head or loss of consciousness. However, patient is noted to have some bruising on her face. She is on blood thinners. Patient denies somatic complaints. However, when her  arrives he also reports that she has had some episodes of low oxygen at home. Uses CPAP, she does not use oxygen during the day. Patient denies any shortness of breath, chest pain, cough, fever. Old records reviewed: No pertinent information noted. No other complaints, modifying factors or associated symptoms. I have reviewed the following from the nursing documentation.     Past Medical History:   Diagnosis Date    Anemia, unspecified 2010    neg bone marrow    Asthma     Atrial fibrillation ([address]     Baker's cyst     Basal cell carcinoma of left cheek 3/12/2019    Blind left eye     Carotid artery stenoses     Carotid stenosis 2002    left    Cervical spinal cord compression ([address] 11/2010    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-sided sciatica 8/9/2016    CKD (chronic kidney disease) stage 3, GFR 30-59 ml/min ([facility])     Community acquired pneumonia of left lower lobe of lung 9/8/2018    CRI     Detached retina, left     Diverticulosis     DJD (degenerative joint disease)     Edema     chronic    Fatty liver     GERD (gastroesophageal reflux disease)     Herniated lumbar intervertebral disc     Hx stage 2 sacral decubitus ulcer 4/13/2018    Hyperlipidemia     Hypertension     Hypothyroid 1/27/2015    Morbid obesity with BMI of 40.0-44.9, adult ([facility] 2/10/2017    BETSY treated with BiPAP     Parkinson's disease ([facility]     Partial symptomatic epilepsy with complex partial seizures, not intractable, without status epilepticus ([facility] 1/24/2020    Restless leg syndrome     Restless legs syndrome     Rheumatic fever 1950    2x IN [place]    Sleep apnea     Tremor, essential 8/16/2010    Type 2 diabetes mellitus without complication (HCC)     Type II or unspecified type diabetes mellitus without mention of complication, not stated as uncontrolled      Past Surgical History:   Procedure Laterality Date    APPENDECTOMY      CAROTID ENDARTERECTOMY      left    CATARACT REMOVAL      CERVICAL FUSION  11/2010    CHOLECYSTECTOMY      COLONOSCOPY N/A 3/8/2020    COLONOSCOPY FLEXIBLE W/ DECOMPRESSION performed [facility]      HYSTERECTOMY      IR MIDLINE CATH  10/8/2021    IR MIDLINE CATH 10/8/2021 [facility]    JOINT REPLACEMENT Bilateral 2003    KNEE SURGERY      replacement x 2    OVARY REMOVAL      SHOULDER SURGERY      TONSILLECTOMY      TUBAL LIGATION      UPPER GASTROINTESTINAL ENDOSCOPY  03/17/2020    Esophagitis    UPPER GASTROINTESTINAL ENDOSCOPY N/A 3/17/2020    EGD performed by Daryl Tam MD at [address] [place] [address],[address] [facility]       Family History   Problem Relation Age of Onset    Diabetes Mother     Heart Disease Mother     Diabetes Father     Heart Disease Father     Diabetes Sister      Social History     Socioeconomic History    Marital status:      Spouse name: Manisha Ugalde Number of children: 2    Years of education: 15    Highest education level: Not on file   Occupational History    Not on file   Tobacco Use    Smoking status: Never Smoker    Smokeless tobacco: Never Used   Vaping Use    Vaping Use: Never used   Substance and Sexual Activity    Alcohol use: No    Drug use: No    Sexual activity: Not Currently   Other Topics Concern    Not on file   Social History Narrative    ** Merged History Encounter **          Social Determinants of Health     Financial Resource Strain:     Difficulty of Paying Living Expenses: Not on file   Food Insecurity:     Worried About Running Out of Food in the Last Year: Not on file    Deja of Food in the Last Year: Not on file   Transportation Needs:     Lack of Transportation (Medical): Not on file    Lack of Transportation (Non-Medical): Not on file   Physical Activity:     Days of Exercise per Week: Not on file    Minutes of Exercise per Session: Not on file   Stress:     Feeling of Stress : Not on file   Social Connections: Unknown    Frequency of Communication with Friends and Family: Not on file    Frequency of Social Gatherings with Friends and Family: Once a week    Attends Hindu Services: Never    Active Member of Clubs or Organizations: No    Attends Club or Organization Meetings: Never    Marital Status:    Intimate Partner Violence:     Fear of Current or Ex-Partner: Not on file    Emotionally Abused: Not on file    Physically Abused: Not on file    Sexually Abused: Not on file   Housing Stability: [address] Unable to Pay for Housing in the Last Year: No    Number of [place] in the Last Year: 1    Unstable Housing in the Last Year: No     No current facility-administered medications for this encounter.      Current Outpatient Medications   Medication Sig Dispense Refill    albuterol sulfate  (90 Base) MCG/ACT inhaler INHALE 2 PUFFS INTO THE LUNGS EVERY 6 HOURS AS NEEDED FOR WHEEZING 3 each 2    cetirizine (ZYRTEC) 10 MG tablet Take 0.5 tablets by mouth daily 15 tablet 0    furosemide (LASIX) 40 MG tablet TAKE 1/2 TABLET EVERY DAY 45 tablet 1 Phenazopyridine     Pyridium [Phenazopyridine Hcl]     Reglan [Metoclopramide Hcl]     Reglan [Metoclopramide Hcl]      tremors       REVIEW OF SYSTEMS  All systems reviewed, pertinent positives per HPI otherwise noted to be negative. PHYSICAL EXAM  GENERAL APPEARANCE: Melford Curling is in no acute respiratory distress. Awake and alert. Answers questions appropriately. VITAL SIGNS: BP (!) 163/134   Pulse 75   Temp 99.4 °F (37.4 °C) (Oral)   Resp 16   Ht 5' 6\" (1.676 m)   Wt 256 lb (116.1 kg)   SpO2 98%   BMI 41.32 kg/m²   HEENT: Normocephalic, bruising to the right cheek with mild tenderness. No Hopkinss sign or Raccoon Eyes. No depressed skull fractures. Extraocular muscles are intact. Pupils equal round and reactive to light. Conjunctivas are pink. Negative scleral icterus. No epistaxis. No septal hematomas. No hemotympanum. Mucous membranes are moist. Tongue in the midline. Pharynx without erythema or exudates. No uvular deviation. NECK: Nontender and supple. No stridor or meningismus. Trachea in the midine. Cervical spine is non-tender to palpation in the midline. No abrasions. CHEST: Nontender to palpation. Clear to auscultation bilaterally. No rales, rhonchi, or wheezing. No abrasions. HEART: Regular rate and rhythm. No murmurs, gallops or rubs. Strong and equal pulses in the upper and lower extremities. ABDOMEN: Soft, nontender, nondistended, positive bowel sounds, no palpable pulsatile masses. No abrasions. MUSCULOSKELETAL: Cervical, thoracic, and lumbosacral spines are non-tender to palpation. Bilateral lower extremity edema. Theres no bruising, or step-offs. Upper and lower extremities have no deformities and they are non-tender to palpation. The calves are nontender to palpation. Active range of motion. Negative bilateral straight leg raises. NEUROLOGICAL: Awake, alert and oriented x 3. Power intact in the upper and lower extremities.  Sensation is intact to light touch in the upper and lower extremities. GCS 14. IMMUNOLOGICAL: No palpable lymphadenopathy or lymphatic streaking. DERMATOLOGIC: No petechiae, rashes, or ecchymoses. No lacerations or abrasions. LABS  Labs pending at the time of signout      ECG  The Ekg interpreted by me shows  normal sinus rhythm with a rate of 77  Axis is   Normal  QTc is  within an acceptable range  Intervals and Durations are unremarkable. ST Segments: nonspecific changes  No significant change from prior EKG dated 12/2/21      RADIOLOGY  Imaging pending at signout    XR PELVIS (1-2 VIEWS)      XR CHEST PORTABLE      CT Cervical Spine WO Contrast      CT FACIAL BONES WO CONTRAST      CT Head WO Contrast             ED COURSE / MDM  Patient seen and evaluated. Old records reviewed and pertinent information included in HPI. Labs and imaging reviewed and results discussed with patient. Overall well appearing patient, in no acute distress, presenting for fall. Physical exam remarkable for lower extremity edema. Differential diagnosis includes but is not limited to: Mechanical fall, pelvic fracture, intracranial trauma, CHF, pneumonia, pneumothorax, pleural effusion, ACS, arrhythmia, COPD    All labs and imaging were pending at the time of signout. I have signed out [facility] Emergency Department care to Dr. Bee Pizano. All labs and imaging pending. We discussed the pertinent history, physical exam, completed/pending test results (if applicable) and current treatment plan. Please refer to his/her chart for the patients remaining Emergency Department course and final disposition. CLINICAL IMPRESSION  1. Fall, initial encounter        Blood pressure (!) 163/134, pulse 75, temperature 99.4 °F (37.4 °C), temperature source Oral, resp. rate 16, height 5' 6\" (1.676 m), weight 256 lb (116.1 kg), SpO2 98 %, not currently breastfeeding.     DISPOSITION  Brody Labrum was Signed out to oncoming physician in stable condition. DISCLAIMER: This chart was created using Dragon dictation software. Efforts were made by me to ensure accuracy, however some errors may be present due to limitations of this technology and occasionally words are not transcribed correctly.           Dolores Galarza MD  04/24/22 1122

## 2022-04-28 ENCOUNTER — TELEPHONE (OUTPATIENT)
Dept: FAMILY MEDICINE CLINIC | Age: 82
End: 2022-04-28

## 2022-04-28 ENCOUNTER — OFFICE VISIT (OUTPATIENT)
Dept: FAMILY MEDICINE CLINIC | Age: 82
End: 2022-04-28
Payer: MEDICARE

## 2022-04-28 ENCOUNTER — HOSPITAL ENCOUNTER (OUTPATIENT)
Age: 82
Discharge: HOME | End: 2022-04-28
Payer: MEDICARE

## 2022-04-28 ENCOUNTER — HOSPITAL ENCOUNTER (OUTPATIENT)
Dept: GENERAL RADIOLOGY | Age: 82
Discharge: HOME | End: 2022-04-28
Payer: MEDICARE

## 2022-04-28 VITALS — SYSTOLIC BLOOD PRESSURE: 126 MMHG | OXYGEN SATURATION: 92 % | HEART RATE: 68 BPM | DIASTOLIC BLOOD PRESSURE: 70 MMHG

## 2022-04-28 DIAGNOSIS — Z09 HOSPITAL DISCHARGE FOLLOW-UP: ICD-10-CM

## 2022-04-28 DIAGNOSIS — H92.01 DISCOMFORT OF RIGHT EAR: ICD-10-CM

## 2022-04-28 DIAGNOSIS — R06.02 SHORTNESS OF BREATH: Primary | ICD-10-CM

## 2022-04-28 DIAGNOSIS — H91.93 DECREASED HEARING OF BOTH EARS: ICD-10-CM

## 2022-04-28 DIAGNOSIS — E66.01 OBESITY, CLASS III, BMI 40-49.9 (MORBID OBESITY) (HCC): ICD-10-CM

## 2022-04-28 DIAGNOSIS — R06.02 SHORTNESS OF BREATH: ICD-10-CM

## 2022-04-28 DIAGNOSIS — Z91.81 AT HIGH RISK FOR FALLS: ICD-10-CM

## 2022-04-28 PROCEDURE — G8417 CALC BMI ABV UP PARAM F/U: HCPCS | Performed by: NURSE PRACTITIONER

## 2022-04-28 PROCEDURE — 99212 OFFICE O/P EST SF 10 MIN: CPT | Performed by: NURSE PRACTITIONER

## 2022-04-28 PROCEDURE — G8428 CUR MEDS NOT DOCUMENT: HCPCS | Performed by: NURSE PRACTITIONER

## 2022-04-28 PROCEDURE — 71046 X-RAY EXAM CHEST 2 VIEWS: CPT

## 2022-04-28 PROCEDURE — 1124F ACP DISCUSS-NO DSCNMKR DOCD: CPT | Performed by: NURSE PRACTITIONER

## 2022-04-28 PROCEDURE — 1090F PRES/ABSN URINE INCON ASSESS: CPT | Performed by: NURSE PRACTITIONER

## 2022-04-28 PROCEDURE — 1111F DSCHRG MED/CURRENT MED MERGE: CPT | Performed by: NURSE PRACTITIONER

## 2022-04-28 PROCEDURE — G8400 PT W/DXA NO RESULTS DOC: HCPCS | Performed by: NURSE PRACTITIONER

## 2022-04-28 PROCEDURE — 1036F TOBACCO NON-USER: CPT | Performed by: NURSE PRACTITIONER

## 2022-04-28 RX ORDER — CETIRIZINE HYDROCHLORIDE 10 MG/1
5 TABLET ORAL DAILY
Qty: 15 TABLET | Refills: 0 | Status: SHIPPED | OUTPATIENT
Start: 2022-04-28 | End: 2022-05-23

## 2022-04-28 NOTE — PROGRESS NOTES
2022  Katerin Zayas (: 1940)  80 y.o.    ASSESSMENT and PLAN:  Marlys Aggarwal was seen today for ed follow-up. Diagnoses and all orders for this visit:    Shortness of breath  -     XR CHEST STANDARD (2 VW); Future  -     Brain Natriuretic Peptide; Future  -     [place] DISCHARGE MEDS RECONCILED W/ CURRENT OUTPATIENT MED LIST  -rule out acute changes, pne/fvo.   -continue to monitor weights daily. Reduce salt in diet. Elevate extremities. -monitor spo2 at home.   -Alarm symptoms discussed. Discomfort of right ear  -cetirizine (ZYRTEC) 10 MG tablet; Take 0.5 tablets by mouth daily  -no s/sx of infection. Decreased hearing of both ears  -   cetirizine (ZYRTEC) 10 MG tablet; Take 0.5 tablets by mouth daily  -rec's for audiology-hearing eval, and ENT. On lasix. Obesity, Class III, BMI 40-49.9 (morbid obesity) ([facility]  -continue to monitor weight. Hospital discharge follow-up  -     [place] DISCHARGE MEDS RECONCILED W/ CURRENT OUTPATIENT MED LIST  -Stable, continue current regimen. At high risk for falls  -safety tips provided. -Monitor closely. Return in about 3 months (around 2022). HPI  Presenting s/p fall last week. Has been alittle weaker recently. Suffered a fall while transferring from c, to bed. Wearing bpap at night everynight. Seems more sob recently. Denies any fevers, chills, body aches, change in mentation. Taking meds as prescribed. Appetite not great. On allergy meds. Denies cp, palpitations, syncope, lightheadedness/dizziness. Review of Systems   Constitutional: Positive for fatigue. Negative for activity change, appetite change, fever and unexpected weight change. HENT: Negative for congestion, postnasal drip and sore throat. Respiratory: Positive for shortness of breath. Negative for cough, chest tightness and wheezing. Cardiovascular: Positive for leg swelling. Negative for chest pain and palpitations.    Gastrointestinal: Negative for constipation, diarrhea, nausea and vomiting. Genitourinary: Negative. Negative for difficulty urinating and dysuria. Musculoskeletal: Negative. Negative for gait problem. Neurological: Positive for weakness. Negative for dizziness, syncope, light-headedness, numbness and headaches. Psychiatric/Behavioral: Negative. Allergies, past medical history, family history, and social history reviewed and unchanged from previous encounter.      Current Outpatient Medications   Medication Sig Dispense Refill    albuterol sulfate  (90 Base) MCG/ACT inhaler INHALE 2 PUFFS INTO THE LUNGS EVERY 6 HOURS AS NEEDED FOR WHEEZING 3 each 2    cetirizine (ZYRTEC) 10 MG tablet Take 0.5 tablets by mouth daily 15 tablet 0    furosemide (LASIX) 40 MG tablet TAKE 1/2 TABLET EVERY DAY 45 tablet 1    NIFEdipine (PROCARDIA XL) 90 MG extended release tablet TAKE 1 TABLET EVERY DAY 90 tablet 1    metoprolol tartrate (LOPRESSOR) 50 MG tablet TAKE 1 TABLET TWICE DAILY 180 tablet 1    DULoxetine (CYMBALTA) 60 MG extended release capsule TAKE 1 CAPSULE EVERY DAY 90 capsule 1    fluticasone-vilanterol (BREO ELLIPTA) 100-25 MCG/INH AEPB inhaler INHALE 1 PUFF EVERY DAY 3 each 3    atorvastatin (LIPITOR) 40 MG tablet TAKE 1 AND 1/2 TABLETS EVERY  tablet 3    zinc oxide 16 % OINT ointment Apply topically 4 times daily as needed for Dry Skin 50 g 1    montelukast (SINGULAIR) 10 MG tablet TAKE 1 TABLET EVERY DAY 90 tablet 1    propafenone (RYTHMOL) 150 MG tablet TAKE 1 TABLET BY MOUTH EVERY 8 HOURS 270 tablet 3    apixaban (ELIQUIS) 2.5 MG TABS tablet Take 1 tablet by mouth 2 times daily 180 tablet 3    valsartan (DIOVAN) 160 MG tablet Take 160 mg by mouth daily      ferrous sulfate (IRON 325) 325 (65 Fe) MG tablet Take 325 mg by mouth 2 times daily       fluticasone (FLONASE) 50 MCG/ACT nasal spray USE 2 PUFFS IN EACH NOSTRIL DAILY 3 Bottle 10    carbidopa-levodopa (SINEMET)  MG per tablet Take 1 tablet by mouth 4 times daily      Alpha-Lipoic Acid 300 MG TABS Take by mouth daily      lamoTRIgine (LAMICTAL) 25 MG tablet Take 25 mg by mouth 2 times daily 2 tabs BID      Handicap Placard MISC by Does not apply route DX: G20  parkinson's disease  Exp: 9/1/2024 1 each 0    ipratropium-albuterol (DUONEB) 0.5-2.5 (3) MG/3ML SOLN nebulizer solution Inhale 3 mLs into the lungs every 6 hours as needed for Shortness of Breath DX: J45.40 360 mL 1    Multiple Vitamins-Minerals (THERAPEUTIC MULTIVITAMIN-MINERALS) tablet Take 1 tablet by mouth daily      aspirin EC 81 MG EC tablet Take 1 tablet by mouth daily. Start taking next week. Indications: OTC 30 tablet 2     No current facility-administered medications for this visit. Vitals:    04/28/22 1333   BP: 126/70   Site: Right Lower Arm   Position: Sitting   Cuff Size: Medium Adult   Pulse: 68   SpO2: 92%     Estimated body mass index is 41.32 kg/m² as calculated from the following:    Height as of 4/21/22: 5' 6\" (1.676 m). Weight as of 4/21/22: 256 lb (116.1 kg). Physical Exam  Vitals reviewed. Constitutional:       Appearance: Normal appearance. She is normal weight. HENT:      Head: Normocephalic and atraumatic. Nose: Nose normal.   Eyes:      Conjunctiva/sclera: Conjunctivae normal.   Cardiovascular:      Rate and Rhythm: Normal rate and regular rhythm. Pulses: Normal pulses. Heart sounds: Normal heart sounds. Pulmonary:      Effort: Pulmonary effort is normal. No respiratory distress. Breath sounds: Rhonchi present. No wheezing. Chest:      Chest wall: No tenderness. Abdominal:      General: Abdomen is flat. Bowel sounds are normal.      Palpations: Abdomen is soft. Tenderness: There is no abdominal tenderness. There is no guarding. Musculoskeletal:         General: Normal range of motion. Cervical back: Normal range of motion and neck supple. Right lower leg: Edema present. Left lower leg: Edema present.    Skin: General: Skin is warm and dry. Capillary Refill: Capillary refill takes less than 2 seconds. Neurological:      General: No focal deficit present. Mental Status: She is alert and oriented to person, place, and time. Mental status is at baseline. Psychiatric:         Mood and Affect: Mood normal.         Behavior: Behavior normal.         Thought Content: Thought content normal.         Judgment: Judgment normal.       On the basis of positive falls risk screening, assessment and plan is as follows: home safety tips provided.

## 2022-04-29 LAB — PRO-BNP: 2164 PG/ML (ref 0–449)

## 2022-04-29 NOTE — PROGRESS NOTES
Oziel Nicholas  1940.81 y.o. female   [facility] today, 5/3/2022 , for a Hearing Aid Evaluation following audiologic evaluation on 04/07/2022. Oziel Nicholas was accompanied by Clearance, her . Patient has no prior history of hearing aid use. Patient was found to have no insurance benefit for hearing aids-possible access to third party  program ([address] [place] [address]). Patient is responsible for cost of devices. Hearing aid benefit worksheet scanned into media tab. DATE: 5/3/2022     PROCEDURES:     SOUNDFIELD TESTING:     Name of test Type of amplification Level of signal Level of noise % Correct        Nu-6  None 55dBHL N/A 72%        Nu-6  None 45dBHL N/A 28%    QuickSIN None 70dBHL varied 8.5 SNR loss       LIFESTYLE EVALUATION:      How do you spend most of your day? Patient is at home with Clearance, , has family in area (two boys), goes to grand kids events, goes to different shows/events, difficulty in car sometimes and doctors appointments. Which ear do you use on the phone? Right         Land line or cell phone  Land line (turns up)      Has anyone recommended you wear HAs before? If yes, have you tried HAs? If no, why not? Do you feel like your hearing loss limits or hampers your personal or social life in any way? No    Does a hearing problem cause you to feel embarrassed when you meet new people? Yes    How do you compensate for things you miss or misunderstand?  - If can't ask to repeat     Does a hearing problem cause you to feel frustrated when talking to members of your family?  - No      Do they get frustrated with you?  - Yes     How does it make you feel when you miss things?  - embarrassed and frustrated     Does your hearing problem cause you difficulty when visiting friends, relatives, or neighbors?  Yes    Provide a guesstimate of the % of the service or meeting that you hear and understand clearly  %    Does your hearing problem cause you difficulty when listening to a TV or radio? Yes    Do others feel that your TV/radio is too loud? Yes    Does a hearing problem cause you difficulty when in a restaurant with relatives or friends? Yes    When is your hearing loss most problematic?  -  Crowds     In what situation would you most like to hear better? 1. Phone (family members-grand daughter with cochlear implants)  2. Television   3. Conversation with noise         After a discussion of evaluation results, discussion of levels of technology and prices, and lifestyle assessment, Erwin Marte has decided to pursue hearing aids. Technology to be ordered: Crowdwave P90-R. Picked color P3,  power 2(M), small power dome for the right and small vented dome for the left. Signed medical evaluation waiver and purchase agreement. Patient cost due at time of fitting: total cost of $5,900.00    PATIENT EDUCATION:      - Verbally reviewed benefits and limitations of devices and available features in hearing aids. - Verbally discussed realistic expectations of hearing aid use     Information was shared verbally with patient during appointment. RECOMMENDATIONS:     - Return as scheduled for hearing aid fitting      No charge for today's appointment.       Nhung Mcmanus  Audiologist

## 2022-05-02 DIAGNOSIS — I50.32 CHRONIC DIASTOLIC CONGESTIVE HEART FAILURE (HCC): Primary | ICD-10-CM

## 2022-05-02 DIAGNOSIS — N18.4 CHRONIC KIDNEY DISEASE, STAGE 4 (SEVERE) (HCC): ICD-10-CM

## 2022-05-03 ENCOUNTER — PROCEDURE VISIT (OUTPATIENT)
Dept: AUDIOLOGY | Age: 82
End: 2022-05-03

## 2022-05-03 ENCOUNTER — OFFICE VISIT (OUTPATIENT)
Dept: ENT CLINIC | Age: 82
End: 2022-05-03
Payer: MEDICARE

## 2022-05-03 VITALS — HEIGHT: 66 IN | BODY MASS INDEX: 41.46 KG/M2 | WEIGHT: 258 LBS | TEMPERATURE: 97.2 F

## 2022-05-03 DIAGNOSIS — H90.3 ASYMMETRIC SNHL (SENSORINEURAL HEARING LOSS): Primary | ICD-10-CM

## 2022-05-03 DIAGNOSIS — H90.3 SENSORINEURAL HEARING LOSS, BILATERAL: Primary | ICD-10-CM

## 2022-05-03 DIAGNOSIS — H93.13 TINNITUS, BILATERAL: ICD-10-CM

## 2022-05-03 DIAGNOSIS — H93.13 TINNITUS OF BOTH EARS: ICD-10-CM

## 2022-05-03 PROCEDURE — 99999 PR OFFICE/OUTPT VISIT,PROCEDURE ONLY: CPT | Performed by: AUDIOLOGIST

## 2022-05-03 PROCEDURE — 99203 OFFICE O/P NEW LOW 30 MIN: CPT | Performed by: OTOLARYNGOLOGY

## 2022-05-03 PROCEDURE — 1123F ACP DISCUSS/DSCN MKR DOCD: CPT | Performed by: OTOLARYNGOLOGY

## 2022-05-03 PROCEDURE — G8400 PT W/DXA NO RESULTS DOC: HCPCS | Performed by: OTOLARYNGOLOGY

## 2022-05-03 PROCEDURE — 1036F TOBACCO NON-USER: CPT | Performed by: OTOLARYNGOLOGY

## 2022-05-03 PROCEDURE — G8427 DOCREV CUR MEDS BY ELIG CLIN: HCPCS | Performed by: OTOLARYNGOLOGY

## 2022-05-03 PROCEDURE — 4040F PNEUMOC VAC/ADMIN/RCVD: CPT | Performed by: OTOLARYNGOLOGY

## 2022-05-03 PROCEDURE — G8417 CALC BMI ABV UP PARAM F/U: HCPCS | Performed by: OTOLARYNGOLOGY

## 2022-05-03 PROCEDURE — 1090F PRES/ABSN URINE INCON ASSESS: CPT | Performed by: OTOLARYNGOLOGY

## 2022-05-03 ASSESSMENT — ENCOUNTER SYMPTOMS
VOMITING: 0
SHORTNESS OF BREATH: 0
FACIAL SWELLING: 0
EYE ITCHING: 0
TROUBLE SWALLOWING: 0
COUGH: 0
TROUBLE SWALLOWING: 0
DIARRHEA: 0
WHEEZING: 0
VOICE CHANGE: 0
APNEA: 0
SHORTNESS OF BREATH: 0
NAUSEA: 0
SORE THROAT: 0
SINUS PRESSURE: 0
COUGH: 0
CONSTIPATION: 0
CHEST TIGHTNESS: 0

## 2022-05-03 NOTE — PROGRESS NOTES
Phill[address] Mcelroy [address], [address], [facility] [place]  P: [phone]       Patient     Marley Fuentes  1940    ChiefComplaint     Chief Complaint   Patient presents with    New Patient     Patient states that she has difficulty hearing out of her right ear       History of Present Illness     Mayito Draper is a 82-year-old female here today with her  for evaluation of asymmetric sensorineural hearing loss. Onset several years ago believes right ear has worsened significantly in the last year. Family history of hearing loss. Admits bilateral tinnitus. Right greater than left. No previous ear surgery. Has diagnosis of Parkinson's.     Past Medical History     Past Medical History:   Diagnosis Date    Anemia, unspecified 2010    neg bone marrow    Asthma     Atrial fibrillation ([address]     Baker's cyst     Basal cell carcinoma of left cheek 3/12/2019    Blind left eye     Carotid artery stenoses     Carotid stenosis 2002    left    Cervical spinal cord compression ([address] 11/2010    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-sided sciatica 8/9/2016    CKD (chronic kidney disease) stage 3, GFR 30-59 ml/min ([facility])     Community acquired pneumonia of left lower lobe of lung 9/8/2018    CRI     Detached retina, left     Diverticulosis     DJD (degenerative joint disease)     Edema     chronic    Fatty liver     GERD (gastroesophageal reflux disease)     Hearing loss     Herniated lumbar intervertebral disc     Hx stage 2 sacral decubitus ulcer 4/13/2018    Hyperlipidemia     Hypertension     Hypothyroid 1/27/2015    Morbid obesity with BMI of 40.0-44.9, adult ([address] 2/10/2017    BETSY treated with BiPAP     Parkinson's disease ([address]     Partial symptomatic epilepsy with complex partial seizures, not intractable, without status epilepticus ([address] 1/24/2020    Restless leg syndrome     Restless legs syndrome     Rheumatic fever 1950    2x [place]    Sleep apnea  Tremor, essential 8/16/2010    Type 2 diabetes mellitus without complication (HCC)     Type II or unspecified type diabetes mellitus without mention of complication, not stated as uncontrolled        Past Surgical History     Past Surgical History:   Procedure Laterality Date    APPENDECTOMY      CAROTID ENDARTERECTOMY      left    CATARACT REMOVAL      CERVICAL FUSION  11/2010    CHOLECYSTECTOMY      COLONOSCOPY N/A 3/8/2020    COLONOSCOPY FLEXIBLE W/ DECOMPRESSION performed by [facility]ush [facility] at [facility]      IR MIDLINE CATH  10/8/2021    IR MIDLINE CATH 10/8/2021 [facility]    JOINT REPLACEMENT Bilateral 2003    KNEE SURGERY      replacement x 2    OVARY REMOVAL      SHOULDER SURGERY      TONSILLECTOMY      TUBAL LIGATION      UPPER GASTROINTESTINAL ENDOSCOPY  03/17/2020    Esophagitis    UPPER GASTROINTESTINAL ENDOSCOPY N/A 3/17/2020    EGD performed by Dulce Bangura MD at [address] [place] [address]         Family History     Family History   Problem Relation Age of Onset    Diabetes Mother     Heart Disease Mother     Diabetes Father     Heart Disease Father     Diabetes Sister        Social History     Social History     Tobacco Use    Smoking status: Never Smoker    Smokeless tobacco: Never Used   Vaping Use    Vaping Use: Never used   Substance Use Topics    Alcohol use: No    Drug use: No        Allergies     Allergies   Allergen Reactions    Levaquin [Levofloxacin]      Pt was given in er and developed hives and redness    Metoclopramide     Phenazopyridine     Pyridium [Phenazopyridine Hcl]     Reglan [Metoclopramide Hcl]     Reglan [Metoclopramide Hcl]      tremors       Medications     Current Outpatient Medications   Medication Sig Dispense Refill    cetirizine (ZYRTEC) 10 MG tablet Take 0.5 tablets by mouth daily 15 tablet 0    albuterol sulfate  (90 Base) MCG/ACT inhaler INHALE 2 PUFFS INTO THE LUNGS EVERY 6 HOURS AS NEEDED FOR WHEEZING 3 each 2    furosemide (LASIX) 40 MG tablet TAKE 1/2 TABLET EVERY DAY 45 tablet 1    NIFEdipine (PROCARDIA XL) 90 MG extended release tablet TAKE 1 TABLET EVERY DAY 90 tablet 1    metoprolol tartrate (LOPRESSOR) 50 MG tablet TAKE 1 TABLET TWICE DAILY 180 tablet 1    DULoxetine (CYMBALTA) 60 MG extended release capsule TAKE 1 CAPSULE EVERY DAY 90 capsule 1    fluticasone-vilanterol (BREO ELLIPTA) 100-25 MCG/INH AEPB inhaler INHALE 1 PUFF EVERY DAY 3 each 3    atorvastatin (LIPITOR) 40 MG tablet TAKE 1 AND 1/2 TABLETS EVERY  tablet 3    zinc oxide 16 % OINT ointment Apply topically 4 times daily as needed for Dry Skin 50 g 1    montelukast (SINGULAIR) 10 MG tablet TAKE 1 TABLET EVERY DAY 90 tablet 1    propafenone (RYTHMOL) 150 MG tablet TAKE 1 TABLET BY MOUTH EVERY 8 HOURS 270 tablet 3    apixaban (ELIQUIS) 2.5 MG TABS tablet Take 1 tablet by mouth 2 times daily 180 tablet 3    valsartan (DIOVAN) 160 MG tablet Take 160 mg by mouth daily      fluticasone (FLONASE) 50 MCG/ACT nasal spray USE 2 PUFFS IN EACH NOSTRIL DAILY 3 Bottle 10    carbidopa-levodopa (SINEMET)  MG per tablet Take 1 tablet by mouth 4 times daily      Alpha-Lipoic Acid 300 MG TABS Take by mouth daily      lamoTRIgine (LAMICTAL) 25 MG tablet Take 25 mg by mouth 2 times daily 2 tabs BID      Handicap Placard MISC by Does not apply route DX: G20  parkinson's disease  Exp: 9/1/2024 1 each 0    ipratropium-albuterol (DUONEB) 0.5-2.5 (3) MG/3ML SOLN nebulizer solution Inhale 3 mLs into the lungs every 6 hours as needed for Shortness of Breath DX: J45.40 360 mL 1    Multiple Vitamins-Minerals (THERAPEUTIC MULTIVITAMIN-MINERALS) tablet Take 1 tablet by mouth daily      aspirin EC 81 MG EC tablet Take 1 tablet by mouth daily. Start taking next week.   Indications: OTC 30 tablet 2    ferrous sulfate (IRON 325) 325 (65 Fe) MG tablet Take 325 mg by mouth 2 times daily        No current facility-administered medications for this visit. Review of Systems     Review of Systems   Constitutional: Negative for appetite change, chills, fatigue, fever and unexpected weight change. HENT: Positive for hearing loss and tinnitus. Negative for congestion, ear discharge, ear pain, facial swelling, nosebleeds, postnasal drip, sinus pressure, sneezing, sore throat, trouble swallowing and voice change. Eyes: Negative for itching. Respiratory: Negative for apnea, cough and shortness of breath. Endocrine: Negative for cold intolerance and heat intolerance. Musculoskeletal: Negative for myalgias and neck pain. Skin: Negative for rash. Allergic/Immunologic: Negative for environmental allergies. Neurological: Negative for dizziness and headaches. Psychiatric/Behavioral: Negative for confusion, decreased concentration and sleep disturbance. PhysicalExam     Vitals:    05/03/22 1257   Temp: 97.2 °F (36.2 °C)   Weight: 258 lb (117 kg)   Height: 5' 6\" (1.676 m)       Physical Exam  Constitutional:       General: She is not in acute distress. Appearance: She is well-developed. HENT:      Head: Normocephalic and atraumatic. Right Ear: Tympanic membrane, ear canal and external ear normal. No drainage. No middle ear effusion. Tympanic membrane is not bulging. Tympanic membrane has normal mobility. Left Ear: Tympanic membrane, ear canal and external ear normal. No drainage. No middle ear effusion. Tympanic membrane is not bulging. Tympanic membrane has normal mobility. Nose: No mucosal edema or rhinorrhea. Mouth/Throat:      Lips: Pink. Mouth: Mucous membranes are moist.      Tongue: No lesions. Palate: No mass. Pharynx: Uvula midline. Eyes:      Pupils: Pupils are equal, round, and reactive to light. Neck:      Thyroid: No thyroid mass or thyromegaly.       Trachea: Trachea and phonation normal.   Cardiovascular:      Pulses: Normal pulses. Pulmonary:      Effort: Pulmonary effort is normal. No accessory muscle usage or respiratory distress. Breath sounds: No stridor. Musculoskeletal:      Cervical back: Full passive range of motion without pain. Lymphadenopathy:      Head:      Right side of head: No submental or submandibular adenopathy. Left side of head: No submental or submandibular adenopathy. Cervical: No cervical adenopathy. Right cervical: No superficial, deep or posterior cervical adenopathy. Left cervical: No superficial, deep or posterior cervical adenopathy. Skin:     General: Skin is warm and dry. Neurological:      Mental Status: She is alert and oriented to person, place, and time. Cranial Nerves: No cranial nerve deficit. Coordination: Coordination normal.      Gait: Gait normal.   Psychiatric:         Thought Content: Thought content normal.                 Assessment and Plan     1. Asymmetric SNHL (sensorineural hearing loss)  - MRI IAC POSTERIOR FOSSA W WO CONTRAST; Future    2. Tinnitus of both ears    Audiogram reviewed right ear moderate low-frequency hearing loss improving to mild downsloping to severe sensorineural hearing loss. Left ear mild downsloping to severe high-frequency sensorineural hearing loss with preserved word recognition score bilaterally. Discussed asymmetric hearing loss in 3 consecutive frequencies of at least 10 dB. Discussed possibility of acoustic neuroma and recommendation for MRI IAC. Patient wishes to proceed. Deangelo Mitchell,   5/3/22      Portions of this note were dictated using Dragon.  There may be linguistic errors secondary to the use of this program.

## 2022-05-06 RX ORDER — MONTELUKAST SODIUM 10 MG/1
TABLET ORAL
Qty: 90 TABLET | Refills: 1 | OUTPATIENT
Start: 2022-05-06

## 2022-05-10 DIAGNOSIS — I50.32 CHRONIC DIASTOLIC CONGESTIVE HEART FAILURE (HCC): ICD-10-CM

## 2022-05-10 DIAGNOSIS — N18.4 CHRONIC KIDNEY DISEASE, STAGE 4 (SEVERE) (HCC): ICD-10-CM

## 2022-05-10 LAB
ANION GAP SERPL CALCULATED.3IONS-SCNC: 17 MMOL/L (ref 3–16)
BUN BLDV-MCNC: 51 MG/DL (ref 7–20)
CALCIUM SERPL-MCNC: 10.1 MG/DL (ref 8.3–10.6)
CHLORIDE BLD-SCNC: 99 MMOL/L (ref 99–110)
CO2: 27 MMOL/L (ref 21–32)
CREAT SERPL-MCNC: 2.4 MG/DL (ref 0.6–1.2)
GFR AFRICAN AMERICAN: 24
GFR NON-AFRICAN AMERICAN: 19
GLUCOSE BLD-MCNC: 97 MG/DL (ref 70–99)
POTASSIUM SERPL-SCNC: 6 MMOL/L (ref 3.5–5.1)
SODIUM BLD-SCNC: 143 MMOL/L (ref 136–145)

## 2022-05-11 ENCOUNTER — HOSPITAL ENCOUNTER (OUTPATIENT)
Dept: MRI IMAGING | Age: 82
Discharge: HOME | End: 2022-05-11
Payer: MEDICARE

## 2022-05-11 ENCOUNTER — HOSPITAL ENCOUNTER (OUTPATIENT)
Age: 82
Discharge: HOME | End: 2022-05-11
Payer: MEDICARE

## 2022-05-11 DIAGNOSIS — H90.3 ASYMMETRIC SNHL (SENSORINEURAL HEARING LOSS): ICD-10-CM

## 2022-05-11 DIAGNOSIS — E87.5 HYPERKALEMIA: ICD-10-CM

## 2022-05-11 DIAGNOSIS — N18.4 CHRONIC KIDNEY DISEASE, STAGE 4 (SEVERE) (HCC): ICD-10-CM

## 2022-05-11 DIAGNOSIS — I50.32 CHRONIC DIASTOLIC CONGESTIVE HEART FAILURE (HCC): ICD-10-CM

## 2022-05-11 DIAGNOSIS — E87.5 HYPERKALEMIA: Primary | ICD-10-CM

## 2022-05-11 PROCEDURE — 70551 MRI BRAIN STEM W/O DYE: CPT

## 2022-05-11 NOTE — PROGRESS NOTES
Patient recent BMP needs to be repeated due to the sample hemolyzing. This can cause an inaccurate potassium result. Please advise patient to have hers redrawn today for most accurate result. Advise low potassium diet today and go to ED if she develops symptoms, muscle weakness or heart palpitations.

## 2022-05-12 ENCOUNTER — TELEPHONE (OUTPATIENT)
Dept: FAMILY MEDICINE CLINIC | Age: 82
End: 2022-05-12

## 2022-05-12 LAB
ANION GAP SERPL CALCULATED.3IONS-SCNC: 16 MMOL/L (ref 3–16)
BUN BLDV-MCNC: 61 MG/DL (ref 7–20)
CALCIUM SERPL-MCNC: 10 MG/DL (ref 8.3–10.6)
CHLORIDE BLD-SCNC: 97 MMOL/L (ref 99–110)
CO2: 24 MMOL/L (ref 21–32)
CREAT SERPL-MCNC: 2.7 MG/DL (ref 0.6–1.2)
GFR AFRICAN AMERICAN: 21
GFR NON-AFRICAN AMERICAN: 17
GLUCOSE BLD-MCNC: 80 MG/DL (ref 70–99)
POTASSIUM SERPL-SCNC: 5.1 MMOL/L (ref 3.5–5.1)
SODIUM BLD-SCNC: 137 MMOL/L (ref 136–145)

## 2022-05-12 NOTE — TELEPHONE ENCOUNTER
----- Message from Terry Macdonald sent at 5/10/2022  3:44 PM EDT -----  Subject: Message to Provider    QUESTIONS  Information for Provider? Sydnee with [employer]. Jono Juarez has been trying to   get a signature from Provider Salena for a while now. The paperwork has   been uploaded onto the portal but nothing has been signed. She can be   reached at [phone]. She will fax over the orders again personally, as   this is an urgent matter. ---------------------------------------------------------------------------  --------------  Romario Price INFO  What is the best way for the office to contact you? OK to leave message on   voicemail  Preferred Call Back Phone Number? [phone]  ---------------------------------------------------------------------------  --------------  SCRIPT ANSWERS  Relationship to Patient? Third Party  Third Party Type? Home Health Care? Representative Name?  Sydnee

## 2022-05-12 NOTE — TELEPHONE ENCOUNTER
Spoke to Elian and she will re-fax orders to [phone].  If not received I will follow up with the online portal.

## 2022-05-18 NOTE — PROGRESS NOTES
Melford Curling   1940, 80 y.o. female   [id number]     HEARING AID FITTING      RIGHT EAR: /MODEL: Phonak Audeo P90-R  SN: [id number]  EARMOLD/TUBING/WIRE/DOME: 2(M) with small power domes    LEFT EAR: /MODEL: Phonak Audeo P90-R  SN: [id number]  EARMOLD/TUBING/WIRE/DOME: 2(M) with small vented fomes    HAF: 2022  WARRANTY: 2025  30 DAY RIGHT-TO-RETURN: 2022    BUTTONS: N/A  PROGRAMS: Auto-Sense  PHONE CONNECTIVITY: N/A      Melford Curling was seen today, 2022   to be fit with Salli Mends P90-R hearing aids. Patient fit with 2(M) receivers and small power dome for right and small vented dome for left. Played sound optimization clips to complete the personal profile questions. Programmed to 100% target gain. Counseled patient on use and care of devices and on realistic expectations. Device orientation was completed, includin. Hearing aid insertion/removal   2. Buttons/Indicators    3. How to charge devices     4. Cleaning/maintenance of the device     5. Loss & Damage/Repair warranty information   6. 30-day right-to-return period    Melford Curling noted good sound quality and comfort with hearing aids. Patient demonstrated proper insertion and removal of devices in office. Completed delivery statement and reviewed 30 day trial period and recall process for programming adjustments. PATIENT EDUCATION:       Melford Curling was provided with the Hearing Aid Fitting Checklist as a reference of items discussed at today's appointment. Patient may find additional product information in the provided hearing aid  device manual.    Information was shared verbally with patient during appointment. RECOMMENDATIONS:     - Return for follow-up appointment within 30-day right-to-return period.  - Continue wearing both HAs during all waking hours.   - Retest hearing as medically indicated and/or sooner if a change in hearing is noted.  - Contact audiologist with questions/concerns as needed    Patient paid total cost of $5,900.00.     Unbundled Billing- see associated fees and codes below:    Description Code Amount   Hearing Aids  $4,890.00   Dispensing Fee  $300.00   Fitting Fee (Non-Refundable)  $300.00   Earmold Non-Custom  x2 $50.00 x2 = $100.00   1-Year Service Plan  $250.00   Conformity Evaluation   (Real-Ear Measurements)  $60.00         Carlyon Mcardle, AuD  Audiologist

## 2022-05-19 NOTE — PROGRESS NOTES
[facility] Office Note  5/24/2022     6 Month Follow-Up and Atrial Fibrillation      Subjective: Tylor Toure is here for follow up for PAF, dCHF, HTN, HLD, DM. [place]:    She fell when her walker got away from her 6 weeks ago has black eye on left. 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 She had received her COVID vaccine. She stated she is not very active due to being a fall risk. She had weakness in her legs that she thinks is related to inactivity but also her Parkinson's. OV 12/2/2021 she reports that she had fall 3 to 4 months ago due to her walker. She did not pass out. She sustained swelling and bruising to the left side of her face due to this fall. She did not break any bones in her face. She is currently using O2 therapy at home and this is monitored through home care. She uses a bi-pap machine at night for sleep apnea and is on 2L of O2 at night with machine use. Today 5.24.22 she presents in a wheelchair. She is unable to walk because her legs give out on her. She has neuropathy and can not feel anything on her feet. She has a lift chair at home. She uses a BIPAP at night. She has diabetes. She brings in a BP log from home. BP ranges 130's-150's. She sees a nephrologist. Patient is taking all cardiac medications as prescribed and tolerates them well. Recently had hyperkalemia  Follows with Dr Benny Reyez for nephrology   Renal function stable. She is anemic on iron   Also on asa and apixaban   Lower dose however due to CKD. Patient denies current edema, chest pain, sob, palpitations, dizziness or syncope. PMH:  She has a past medical history of PAF, dCHF, HTN, HLD as well as DM, BETSY on CPAP, CVD s/p R-CEA, CKD3, Parkinsons and COPD. Her most recent stress test on 4/25/17 showed no evidence of  myocardial ischemia or scar. Carotid dopplers 3/16/17 showed moderate plaque.  An echocardiogram from 9/27/18 demonstrates an EF of 47-47%, grade I diastolic dysfunction  Sleep apnea     Tremor, essential 8/16/2010    Type 2 diabetes mellitus without complication ([facility] [address]     Type II or unspecified type diabetes mellitus without mention of complication, not stated as uncontrolled      Past Surgical History:   Procedure Laterality Date    APPENDECTOMY      CAROTID ENDARTERECTOMY      left    CATARACT REMOVAL      CERVICAL FUSION  11/2010    CHOLECYSTECTOMY      COLONOSCOPY N/A 3/8/2020    COLONOSCOPY FLEXIBLE W/ DECOMPRESSION performed by [facility]ush [facility] at [facility]      IR MIDLINE CATH  10/8/2021    IR MIDLINE CATH 10/8/2021 [address] PROCEDURES    JOINT REPLACEMENT Bilateral 2003    KNEE SURGERY      replacement x 2    OVARY REMOVAL      SHOULDER SURGERY      TONSILLECTOMY      TUBAL LIGATION      UPPER GASTROINTESTINAL ENDOSCOPY  03/17/2020    Esophagitis    UPPER GASTROINTESTINAL ENDOSCOPY N/A 3/17/2020    EGD performed by Dulce Bangura MD at [address] [place] [address]         Objective:   /60   Pulse 64   Ht 5' 6\" (1.676 m)   Wt 264 lb (119.7 kg)   SpO2 94%   BMI 42.61 kg/m²     Wt Readings from Last 3 Encounters:   05/24/22 264 lb (119.7 kg)   05/11/22 258 lb (117 kg)   05/03/22 258 lb (117 kg)       Physical Exam:  General: No Respiratory distress, appears well developed and well nourished. Eyes:  Sclera non icteric left cornea opaque  Nose/Sinuses:  negative findings: nose shows no deformity, asymmetry, or inflammation, nasal mucosa normal, septum midline with no perforation or bleeding  Back:  no pain to palpation  Joint:  no active joint inflammation  Musculoskeletal:  negative  Skin:  Warm and dry  Neck:  Negative for JVD and Carotid Bruits. Chest:  Clear to auscultation, respiration easy  Cardiovascular:  RRR diminished , S1S2 normal, no murmur, no rub or thrill.   Extremities:  Nonpitting  edema,  No clubbing, cyanosis,  Pulses: not palpable in feet.   Neuro: intact    Medications:   Outpatient Encounter Medications as of 5/24/2022   Medication Sig Dispense Refill    apixaban (ELIQUIS) 2.5 MG TABS tablet Take 1 tablet by mouth 2 times daily 180 tablet 3    valsartan (DIOVAN) 80 MG tablet Take 1 tablet by mouth daily      cetirizine (ZYRTEC) 10 MG tablet TAKE 1/2 TABLET BY MOUTH EVERY DAY 15 tablet 0    albuterol sulfate  (90 Base) MCG/ACT inhaler INHALE 2 PUFFS INTO THE LUNGS EVERY 6 HOURS AS NEEDED FOR WHEEZING 3 each 2    furosemide (LASIX) 40 MG tablet TAKE 1/2 TABLET EVERY DAY 45 tablet 1    NIFEdipine (PROCARDIA XL) 90 MG extended release tablet TAKE 1 TABLET EVERY DAY 90 tablet 1    metoprolol tartrate (LOPRESSOR) 50 MG tablet TAKE 1 TABLET TWICE DAILY 180 tablet 1    DULoxetine (CYMBALTA) 60 MG extended release capsule TAKE 1 CAPSULE EVERY DAY 90 capsule 1    fluticasone-vilanterol (BREO ELLIPTA) 100-25 MCG/INH AEPB inhaler INHALE 1 PUFF EVERY DAY 3 each 3    atorvastatin (LIPITOR) 40 MG tablet TAKE 1 AND 1/2 TABLETS EVERY  tablet 3    zinc oxide 16 % OINT ointment Apply topically 4 times daily as needed for Dry Skin 50 g 1    montelukast (SINGULAIR) 10 MG tablet TAKE 1 TABLET EVERY DAY 90 tablet 1    propafenone (RYTHMOL) 150 MG tablet TAKE 1 TABLET BY MOUTH EVERY 8 HOURS 270 tablet 3    fluticasone (FLONASE) 50 MCG/ACT nasal spray USE 2 PUFFS IN EACH NOSTRIL DAILY 3 Bottle 10    carbidopa-levodopa (SINEMET)  MG per tablet Take 1 tablet by mouth 4 times daily      Alpha-Lipoic Acid 300 MG TABS Take by mouth daily      lamoTRIgine (LAMICTAL) 25 MG tablet Take 25 mg by mouth 2 times daily 2 tabs BID      ipratropium-albuterol (DUONEB) 0.5-2.5 (3) MG/3ML SOLN nebulizer solution Inhale 3 mLs into the lungs every 6 hours as needed for Shortness of Breath DX: J45.40 360 mL 1    Multiple Vitamins-Minerals (THERAPEUTIC MULTIVITAMIN-MINERALS) tablet Take 1 tablet by mouth daily      [DISCONTINUED] cetirizine (ZYRTEC) 10 MG tablet Take 0.5 tablets by mouth daily 15 tablet 0    [DISCONTINUED] apixaban (ELIQUIS) 2.5 MG TABS tablet Take 1 tablet by mouth 2 times daily 180 tablet 3    [DISCONTINUED] valsartan (DIOVAN) 160 MG tablet Take 160 mg by mouth daily      ferrous sulfate (IRON 325) 325 (65 Fe) MG tablet Take 325 mg by mouth 2 times daily       Handicap Placard MISC by Does not apply route DX: G20  parkinson's disease  Exp: 9/1/2024 1 each 0    [DISCONTINUED] solifenacin (VESICARE) 5 MG tablet Take 1 tablet by mouth daily. 30 tablet 3    [DISCONTINUED] aspirin EC 81 MG EC tablet Take 1 tablet by mouth daily. Start taking next week. Indications: OTC 30 tablet 2     No facility-administered encounter medications on file as of 5/24/2022. Lab Data:  LIPID:   Lab Results   Component Value Date    CHOL 147 09/27/2021    CHOL 143 03/01/2021    CHOL 153 02/04/2019     Lab Results   Component Value Date    TRIG 194 (H) 09/27/2021    TRIG 123 03/01/2021    TRIG 106 02/04/2019     Lab Results   Component Value Date    HDL 49 09/27/2021    HDL 53 03/01/2021    HDL 54 01/24/2020     Lab Results   Component Value Date    LDLCALC 59 09/27/2021    LDLCALC 65 03/01/2021    LDLCALC 65 01/24/2020     Lab Results   Component Value Date    LABVLDL 39 09/27/2021    LABVLDL 25 03/01/2021    LABVLDL 41 01/24/2020     No results found for: CHOLHDLRATIO  PT/INR: No results for input(s): PROTIME, INR in the last 72 hours. A1C:   Lab Results   Component Value Date    LABA1C 6.4 09/27/2021     BNP:  No results for input(s): BNP in the last 72 hours. IMAGING:   I have reviewed the following tests and documented in this encounter as follows:   Discussed with patient  Most recent labs in epic reviewed and discussed with the patient and spouse .    EKG 12.4.21  NSR no ischemia or infarction    Echocardiogram 7/9/2020  Summary   Left ventricular systolic function is normal with a visually estimated   ejection fraction of 55%. The left ventricle is normal in size with mild concentric hypertrophy. No regional wall motion abnormalities are noted. Normal left ventricular diastolic function. Systolic pulmonary artery pressure (SPAP) is normal and estimated at 23 mmHg   (right atrial pressure 3 mmHg). Echo doppler 9.27.18   Summary   Normal left ventricular systolic function with ejection fraction of 55-60%. No regional wall motion abnormalites are seen. Mild concentric left ventricular hypertrophy. Grade I diastolic dysfunction with normal filling pressure. Mild to moderate mitral regurgitation. The left atrium is mildly dilated. Moderate tricuspid regurgitation. Systolic pulmonic artery pressure (SPAP) is estimated at 51 mmHg consistent   with mild pulmonary hypertension (Right atrial pressure of 3 mmHg). Compared to previous study from 3- no changes noted. Stress Test Jose De Jesus [place]) 4/25/17  Summary  There is normal isotope uptake at stress and rest. There is no evidence of  myocardial ischemia or scar. Hyperdynamic LV systolic function with FL>83%  with uniform wall motion. Low risk study. Carotid dopplers 3/16/17  Summary    1. There is moderate plaque seen in the right internal carotid artery with  an estimated diameter reduction of approaching 50%, this could be  underestimated due to calcific shadowing. 2. There is moderate plaque seen in the left internal carotid artery with an  estimated diameter reduction of approaching 50%. 3. The vertebral arteries are patent with antegrade flow bilaterally. 4. Compared to previous exam on 11/25/2015, criteria have been changed to  suggest approaching 50% stenosis in the bilateral internal carotid artery. There is no significant change from previous exam       Echo 3/17/2017:  No change from echo done 3/4/2014. Left ventricular systolic function is normal with the ejection fraction   estimated at 55%.    No regional wall motion abnormalities. Grade II diastolic dysfunction with elevated filling pressure. Mild concentric left ventricular hypertrophy with a sigmoid septum. No   outflow obstruction. Left ventricle size is normal.   Mild mitral annular calcification. Mitral valve leaflets are structurally normal.   Calcification in the chordae of the mitral valve. Mild mitral valve regurgitation. Aortic valve leaflets are mildly thickened with normal opening. No aortic valve regurgitation. Echocardiogram 9/27/18  Summary   Normal left ventricular systolic function with ejection fraction of 55-60%. No regional wall motion abnormalites are seen. Mild concentric left ventricular hypertrophy. Grade I diastolic dysfunction with normal filling pressure. Mild to moderate mitral regurgitation. The left atrium is mildly dilated. Moderate tricuspid regurgitation. Systolic pulmonic artery pressure (SPAP) is estimated at 51 mmHg consistent   with mild pulmonary hypertension (Right atrial pressure of 3 mmHg). Compared to previous study from 3- no changes noted. Echo Complete: 7/9/20   Summary   Left ventricular systolic function is normal with a visually estimated   ejection fraction of 55%. The left ventricle is normal in size with mild concentric hypertrophy. No regional wall motion abnormalities are noted. Normal left ventricular diastolic function. Systolic pulmonary artery pressure (SPAP) is normal and estimated at 23 mmHg   (right atrial pressure 3 mmHg). Assessment:  Encounter Diagnoses   Name Primary?  PAF (paroxysmal atrial fibrillation) (HCC) Yes    Essential hypertension     Chronic diastolic congestive heart failure (HCC)     Nonrheumatic mitral valve regurgitation     Stage 4 chronic kidney disease (HCC)     Acute hyperkalemia     Other iron deficiency anemia          1. Paroxysmal atrial fibrillation currently appears to be sinus.   2. Chronic diastolic failure well compensated  3. Mild to moderate mitral regurgitation. no symptoms or heart failure. 4. Mild pulmonary hypertension   5. Hyperlipidemia   -3/1/2021 , HDL 53, LDL 65,   6. Hypertension BP low normal   7. Diabetes mellitis  8. Carotid artery disease   9. Obesity   10. Sleep apnea    -on BIPAP   11. Dizziness on standing probably orthostatic hypotension now mostly in wheel chair. 12. Chronic anemia due to CKD and possible blood loss on asa and apixaban   13. CKD 4       Plan:  1. Continue to monitor blood pressure at home  2. If you could bring in an alphabetized medication list at your next visit that would be helpful  3. Elevate your legs to reduce swelling. You can also wear below the knee compression stockings  4. Continue current medications. Refills provided  5. Stop Aspirin. Continue apixaban low dose based on kidney function age almost [de-identified] and anemic. 6.  Decrease valsartan to 80 mg daily to see if this helps lower your potassium. BP not too high  7. Labs in 6 weeks: CBC, CMP  8. Follow up in 6 months      This note was scribed in the presence of Dr. Grabiel Blake by Hazel Whiting, [place].   I, Dr. Carolyn Norton, personally performed the services described in this documentation, as scribed by the above signed scribe in my presence. It is both accurate and complete to my knowledge. I agree with the details independently gathered by the clinical support staff, while the remaining scribed note accurately describes my personal service to the patient.

## 2022-05-23 DIAGNOSIS — H92.01 DISCOMFORT OF RIGHT EAR: ICD-10-CM

## 2022-05-23 DIAGNOSIS — H91.93 DECREASED HEARING OF BOTH EARS: ICD-10-CM

## 2022-05-23 RX ORDER — CETIRIZINE HYDROCHLORIDE 10 MG/1
TABLET ORAL
Qty: 15 TABLET | Refills: 0 | Status: ON HOLD | OUTPATIENT
Start: 2022-05-23 | End: 2022-06-20

## 2022-05-23 NOTE — TELEPHONE ENCOUNTER
Refill Request     CONFIRM preferrred pharmacy with the patient. If Mail Order Rx - Pend for 90 day refill.       Last Seen: Last Seen Department: 4/28/2022  Last Seen by PCP: 4/28/2022    Last Written: 4/28/2022    Next Appointment:   Future Appointments   Date Time Provider Katy Ade   5/24/2022  8:15 AM MD Bonny Campos [facility]   5/24/2022 10:30 AM Nhung Pope AND AUDIO [facility]   6/29/2022  9:30 AM MHC CT MAIN [facility]   6/29/2022 10:15 AM Sunni Escobedo MD CLERM PULM [facility]   6/30/2022  1:30 PM MAHAD Weems CNP  [place] [facility] [place]   11/30/2022 10:00 AM MAHAD Chaudhary CNP [facility]       Future appointment scheduled      Requested Prescriptions     Pending Prescriptions Disp Refills    cetirizine (ZYRTEC) 10 MG tablet [Pharmacy Med Name: CETIRIZINE HCL 10 MG TABLET] 15 tablet 0     Sig: TAKE 1/2 TABLET BY MOUTH EVERY DAY

## 2022-05-24 ENCOUNTER — OFFICE VISIT (OUTPATIENT)
Dept: CARDIOLOGY CLINIC | Age: 82
End: 2022-05-24
Payer: MEDICARE

## 2022-05-24 ENCOUNTER — PROCEDURE VISIT (OUTPATIENT)
Dept: AUDIOLOGY | Age: 82
End: 2022-05-24

## 2022-05-24 VITALS
WEIGHT: 264 LBS | OXYGEN SATURATION: 94 % | HEART RATE: 64 BPM | SYSTOLIC BLOOD PRESSURE: 118 MMHG | BODY MASS INDEX: 42.43 KG/M2 | DIASTOLIC BLOOD PRESSURE: 60 MMHG | HEIGHT: 66 IN

## 2022-05-24 DIAGNOSIS — I50.32 CHRONIC DIASTOLIC CONGESTIVE HEART FAILURE (HCC): ICD-10-CM

## 2022-05-24 DIAGNOSIS — N18.4 STAGE 4 CHRONIC KIDNEY DISEASE (HCC): ICD-10-CM

## 2022-05-24 DIAGNOSIS — I34.0 NONRHEUMATIC MITRAL VALVE REGURGITATION: ICD-10-CM

## 2022-05-24 DIAGNOSIS — I48.0 PAF (PAROXYSMAL ATRIAL FIBRILLATION) (HCC): Primary | ICD-10-CM

## 2022-05-24 DIAGNOSIS — D50.8 OTHER IRON DEFICIENCY ANEMIA: ICD-10-CM

## 2022-05-24 DIAGNOSIS — H93.13 TINNITUS, BILATERAL: ICD-10-CM

## 2022-05-24 DIAGNOSIS — H90.3 SENSORINEURAL HEARING LOSS, BILATERAL: Primary | ICD-10-CM

## 2022-05-24 DIAGNOSIS — E87.5 ACUTE HYPERKALEMIA: ICD-10-CM

## 2022-05-24 DIAGNOSIS — I10 ESSENTIAL HYPERTENSION: Chronic | ICD-10-CM

## 2022-05-24 PROCEDURE — V5020 CONFORMITY EVALUATION: HCPCS | Performed by: AUDIOLOGIST

## 2022-05-24 PROCEDURE — 1090F PRES/ABSN URINE INCON ASSESS: CPT | Performed by: INTERNAL MEDICINE

## 2022-05-24 PROCEDURE — 1036F TOBACCO NON-USER: CPT | Performed by: INTERNAL MEDICINE

## 2022-05-24 PROCEDURE — G8400 PT W/DXA NO RESULTS DOC: HCPCS | Performed by: INTERNAL MEDICINE

## 2022-05-24 PROCEDURE — V5265 EAR MOLD/INSERT, DISP: HCPCS | Performed by: AUDIOLOGIST

## 2022-05-24 PROCEDURE — G8417 CALC BMI ABV UP PARAM F/U: HCPCS | Performed by: INTERNAL MEDICINE

## 2022-05-24 PROCEDURE — V5160 DISPENSING FEE BINAURAL: HCPCS | Performed by: AUDIOLOGIST

## 2022-05-24 PROCEDURE — V5261 HEARING AID, DIGIT, BIN, BTE: HCPCS | Performed by: AUDIOLOGIST

## 2022-05-24 PROCEDURE — 99214 OFFICE O/P EST MOD 30 MIN: CPT | Performed by: INTERNAL MEDICINE

## 2022-05-24 PROCEDURE — 1123F ACP DISCUSS/DSCN MKR DOCD: CPT | Performed by: INTERNAL MEDICINE

## 2022-05-24 PROCEDURE — V5299 HEARING SERVICE: HCPCS | Performed by: AUDIOLOGIST

## 2022-05-24 PROCEDURE — V5011 HEARING AID FITTING/CHECKING: HCPCS | Performed by: AUDIOLOGIST

## 2022-05-24 PROCEDURE — G8427 DOCREV CUR MEDS BY ELIG CLIN: HCPCS | Performed by: INTERNAL MEDICINE

## 2022-05-24 RX ORDER — MONTELUKAST SODIUM 10 MG/1
TABLET ORAL
Qty: 90 TABLET | Refills: 1 | OUTPATIENT
Start: 2022-05-24

## 2022-05-24 RX ORDER — VALSARTAN 80 MG/1
80 TABLET ORAL DAILY
Status: SHIPPED | COMMUNITY
Start: 2022-05-24 | End: 2022-07-19

## 2022-05-24 NOTE — PATIENT INSTRUCTIONS
Plan:  1. Continue to monitor blood pressure at home  2. If you could bring in an alphabetized medication list at your next visit that would be helpful  3. Elevate your legs to reduce swelling. You can also wear below the knee compression stockings  4. Continue current medications. Refills provided  5. Stop Aspirin  6. Decrease valsartan to 80 mg daily to see if this helps lower your potassium  7. Labs in 6 weeks: CBC, CMP  8.  Follow up in 6 months

## 2022-05-26 RX ORDER — MONTELUKAST SODIUM 10 MG/1
TABLET ORAL
Qty: 90 TABLET | Refills: 1 | Status: SHIPPED | OUTPATIENT
Start: 2022-05-26

## 2022-06-07 ASSESSMENT — ENCOUNTER SYMPTOMS
DIARRHEA: 0
CHEST TIGHTNESS: 0
COUGH: 0
CONSTIPATION: 0
NAUSEA: 0
SORE THROAT: 0
WHEEZING: 0
VOMITING: 0
SHORTNESS OF BREATH: 1

## 2022-06-17 ENCOUNTER — APPOINTMENT (OUTPATIENT)
Dept: CT IMAGING | Age: 82
DRG: 004 | End: 2022-06-17
Payer: MEDICARE

## 2022-06-17 ENCOUNTER — HOSPITAL ENCOUNTER (INPATIENT)
Age: 82
LOS: 32 days | Discharge: NURSING, REHAB OR LONG-TERM CARE | DRG: 004 | End: 2022-07-19
Attending: EMERGENCY MEDICINE | Admitting: INTERNAL MEDICINE
Payer: MEDICARE

## 2022-06-17 ENCOUNTER — APPOINTMENT (OUTPATIENT)
Dept: GENERAL RADIOLOGY | Age: 82
DRG: 004 | End: 2022-06-17
Payer: MEDICARE

## 2022-06-17 DIAGNOSIS — A41.9 SEPTICEMIA (HCC): ICD-10-CM

## 2022-06-17 DIAGNOSIS — N18.9 CHRONIC KIDNEY DISEASE, UNSPECIFIED CKD STAGE: ICD-10-CM

## 2022-06-17 DIAGNOSIS — J69.0 ASPIRATION PNEUMONIA OF LEFT LOWER LOBE, UNSPECIFIED ASPIRATION PNEUMONIA TYPE (HCC): ICD-10-CM

## 2022-06-17 DIAGNOSIS — D64.9 CHRONIC ANEMIA: ICD-10-CM

## 2022-06-17 DIAGNOSIS — J96.01 ACUTE RESPIRATORY FAILURE WITH HYPOXIA (HCC): Primary | ICD-10-CM

## 2022-06-17 DIAGNOSIS — Z99.11 VENTILATOR DEPENDENT (HCC): ICD-10-CM

## 2022-06-17 PROBLEM — R06.02 SHORTNESS OF BREATH: Status: ACTIVE | Noted: 2022-01-01

## 2022-06-17 LAB
A/G RATIO: 1.6 (ref 1.1–2.2)
ALBUMIN SERPL-MCNC: 3.9 G/DL (ref 3.4–5)
ALP BLD-CCNC: 104 U/L (ref 40–129)
ALT SERPL-CCNC: <5 U/L (ref 10–40)
ANION GAP SERPL CALCULATED.3IONS-SCNC: 11 MMOL/L (ref 3–16)
AST SERPL-CCNC: 15 U/L (ref 15–37)
BASE EXCESS VENOUS: 1.7 MMOL/L (ref -3–3)
BASOPHILS ABSOLUTE: 0 K/UL (ref 0–0.2)
BASOPHILS RELATIVE PERCENT: 0.2 %
BILIRUB SERPL-MCNC: <0.2 MG/DL (ref 0–1)
BILIRUBIN URINE: NEGATIVE
BLOOD, URINE: ABNORMAL
BUN BLDV-MCNC: 40 MG/DL (ref 7–20)
CALCIUM SERPL-MCNC: 10 MG/DL (ref 8.3–10.6)
CARBOXYHEMOGLOBIN: 0.9 % (ref 0–1.5)
CHLORIDE BLD-SCNC: 98 MMOL/L (ref 99–110)
CLARITY: CLEAR
CO2: 28 MMOL/L (ref 21–32)
COLOR: YELLOW
CREAT SERPL-MCNC: 1.6 MG/DL (ref 0.6–1.2)
EKG ATRIAL RATE: 70 BPM
EKG DIAGNOSIS: NORMAL
EKG Q-T INTERVAL: 410 MS
EKG QRS DURATION: 98 MS
EKG QTC CALCULATION (BAZETT): 439 MS
EKG R AXIS: 57 DEGREES
EKG T AXIS: -73 DEGREES
EKG VENTRICULAR RATE: 69 BPM
EOSINOPHILS ABSOLUTE: 0.1 K/UL (ref 0–0.6)
EOSINOPHILS RELATIVE PERCENT: 0.6 %
EPITHELIAL CELLS, UA: ABNORMAL /HPF (ref 0–5)
GFR AFRICAN AMERICAN: 38
GFR NON-AFRICAN AMERICAN: 31
GLUCOSE BLD-MCNC: 118 MG/DL (ref 70–99)
GLUCOSE BLD-MCNC: 124 MG/DL (ref 70–99)
GLUCOSE BLD-MCNC: 151 MG/DL (ref 70–99)
GLUCOSE BLD-MCNC: 159 MG/DL (ref 70–99)
GLUCOSE URINE: NEGATIVE MG/DL
HCO3 VENOUS: 28.4 MMOL/L (ref 23–29)
HCT VFR BLD CALC: 29.4 % (ref 36–48)
HEMOGLOBIN: 9.5 G/DL (ref 12–16)
INFLUENZA A: NOT DETECTED
INFLUENZA B: NOT DETECTED
KETONES, URINE: NEGATIVE MG/DL
LACTIC ACID, SEPSIS: 1.5 MMOL/L (ref 0.4–1.9)
LEUKOCYTE ESTERASE, URINE: NEGATIVE
LV EF: 58 %
LVEF MODALITY: NORMAL
LYMPHOCYTES ABSOLUTE: 1 K/UL (ref 1–5.1)
LYMPHOCYTES RELATIVE PERCENT: 4.7 %
MCH RBC QN AUTO: 28.1 PG (ref 26–34)
MCHC RBC AUTO-ENTMCNC: 32.3 G/DL (ref 31–36)
MCV RBC AUTO: 87.1 FL (ref 80–100)
METHEMOGLOBIN VENOUS: 0.3 %
MICROSCOPIC EXAMINATION: YES
MONOCYTES ABSOLUTE: 1.3 K/UL (ref 0–1.3)
MONOCYTES RELATIVE PERCENT: 5.9 %
NEUTROPHILS ABSOLUTE: 18.8 K/UL (ref 1.7–7.7)
NEUTROPHILS RELATIVE PERCENT: 88.6 %
NITRITE, URINE: NEGATIVE
O2 SAT, VEN: 78 %
O2 THERAPY: ABNORMAL
PCO2, VEN: 54.9 MMHG (ref 40–50)
PDW BLD-RTO: 13.9 % (ref 12.4–15.4)
PERFORMED ON: ABNORMAL
PH UA: 5 (ref 5–8)
PH VENOUS: 7.33 (ref 7.35–7.45)
PLATELET # BLD: 338 K/UL (ref 135–450)
PMV BLD AUTO: 7.8 FL (ref 5–10.5)
PO2, VEN: 45.9 MMHG (ref 25–40)
POTASSIUM REFLEX MAGNESIUM: 5 MMOL/L (ref 3.5–5.1)
PRO-BNP: 815 PG/ML (ref 0–449)
PROCALCITONIN: 0.07 NG/ML (ref 0–0.15)
PROTEIN UA: NEGATIVE MG/DL
RBC # BLD: 3.38 M/UL (ref 4–5.2)
RBC UA: ABNORMAL /HPF (ref 0–4)
SARS-COV-2 RNA, RT PCR: NOT DETECTED
SODIUM BLD-SCNC: 137 MMOL/L (ref 136–145)
SPECIFIC GRAVITY UA: 1.01 (ref 1–1.03)
TCO2 CALC VENOUS: 30 MMOL/L
TOTAL PROTEIN: 6.3 G/DL (ref 6.4–8.2)
TROPONIN: <0.01 NG/ML
URINE TYPE: ABNORMAL
UROBILINOGEN, URINE: 0.2 E.U./DL
WBC # BLD: 21.2 K/UL (ref 4–11)
WBC UA: ABNORMAL /HPF (ref 0–5)

## 2022-06-17 PROCEDURE — 2580000003 HC RX 258: Performed by: INTERNAL MEDICINE

## 2022-06-17 PROCEDURE — 93005 ELECTROCARDIOGRAM TRACING: CPT | Performed by: EMERGENCY MEDICINE

## 2022-06-17 PROCEDURE — 87636 SARSCOV2 & INF A&B AMP PRB: CPT

## 2022-06-17 PROCEDURE — 85025 COMPLETE CBC W/AUTO DIFF WBC: CPT

## 2022-06-17 PROCEDURE — 93306 TTE W/DOPPLER COMPLETE: CPT

## 2022-06-17 PROCEDURE — 6360000002 HC RX W HCPCS: Performed by: INTERNAL MEDICINE

## 2022-06-17 PROCEDURE — 93010 ELECTROCARDIOGRAM REPORT: CPT | Performed by: INTERNAL MEDICINE

## 2022-06-17 PROCEDURE — 87449 NOS EACH ORGANISM AG IA: CPT

## 2022-06-17 PROCEDURE — 80053 COMPREHEN METABOLIC PANEL: CPT

## 2022-06-17 PROCEDURE — 83880 ASSAY OF NATRIURETIC PEPTIDE: CPT

## 2022-06-17 PROCEDURE — 84145 PROCALCITONIN (PCT): CPT

## 2022-06-17 PROCEDURE — 71045 X-RAY EXAM CHEST 1 VIEW: CPT

## 2022-06-17 PROCEDURE — 96374 THER/PROPH/DIAG INJ IV PUSH: CPT

## 2022-06-17 PROCEDURE — 99223 1ST HOSP IP/OBS HIGH 75: CPT | Performed by: INTERNAL MEDICINE

## 2022-06-17 PROCEDURE — 6370000000 HC RX 637 (ALT 250 FOR IP): Performed by: INTERNAL MEDICINE

## 2022-06-17 PROCEDURE — 83605 ASSAY OF LACTIC ACID: CPT

## 2022-06-17 PROCEDURE — 0BH18EZ INSERTION OF ENDOTRACHEAL AIRWAY INTO TRACHEA, VIA NATURAL OR ARTIFICIAL OPENING ENDOSCOPIC: ICD-10-PCS | Performed by: EMERGENCY MEDICINE

## 2022-06-17 PROCEDURE — 89220 SPUTUM SPECIMEN COLLECTION: CPT

## 2022-06-17 PROCEDURE — 71250 CT THORAX DX C-: CPT

## 2022-06-17 PROCEDURE — 87040 BLOOD CULTURE FOR BACTERIA: CPT

## 2022-06-17 PROCEDURE — 31500 INSERT EMERGENCY AIRWAY: CPT

## 2022-06-17 PROCEDURE — 5A1955Z RESPIRATORY VENTILATION, GREATER THAN 96 CONSECUTIVE HOURS: ICD-10-PCS | Performed by: EMERGENCY MEDICINE

## 2022-06-17 PROCEDURE — 6370000000 HC RX 637 (ALT 250 FOR IP): Performed by: EMERGENCY MEDICINE

## 2022-06-17 PROCEDURE — 2500000003 HC RX 250 WO HCPCS: Performed by: EMERGENCY MEDICINE

## 2022-06-17 PROCEDURE — 2580000003 HC RX 258: Performed by: EMERGENCY MEDICINE

## 2022-06-17 PROCEDURE — 94761 N-INVAS EAR/PLS OXIMETRY MLT: CPT

## 2022-06-17 PROCEDURE — 36415 COLL VENOUS BLD VENIPUNCTURE: CPT

## 2022-06-17 PROCEDURE — 99285 EMERGENCY DEPT VISIT HI MDM: CPT

## 2022-06-17 PROCEDURE — 99291 CRITICAL CARE FIRST HOUR: CPT | Performed by: INTERNAL MEDICINE

## 2022-06-17 PROCEDURE — 2000000000 HC ICU R&B

## 2022-06-17 PROCEDURE — 2700000000 HC OXYGEN THERAPY PER DAY

## 2022-06-17 PROCEDURE — 94669 MECHANICAL CHEST WALL OSCILL: CPT

## 2022-06-17 PROCEDURE — 6360000002 HC RX W HCPCS: Performed by: EMERGENCY MEDICINE

## 2022-06-17 PROCEDURE — 87205 SMEAR GRAM STAIN: CPT

## 2022-06-17 PROCEDURE — 87641 MR-STAPH DNA AMP PROBE: CPT

## 2022-06-17 PROCEDURE — 82803 BLOOD GASES ANY COMBINATION: CPT

## 2022-06-17 PROCEDURE — 87070 CULTURE OTHR SPECIMN AEROBIC: CPT

## 2022-06-17 PROCEDURE — 84484 ASSAY OF TROPONIN QUANT: CPT

## 2022-06-17 PROCEDURE — 94640 AIRWAY INHALATION TREATMENT: CPT

## 2022-06-17 PROCEDURE — 81001 URINALYSIS AUTO W/SCOPE: CPT

## 2022-06-17 RX ORDER — ATORVASTATIN CALCIUM 40 MG/1
40 TABLET, FILM COATED ORAL NIGHTLY
Status: DISCONTINUED | OUTPATIENT
Start: 2022-06-17 | End: 2022-07-19 | Stop reason: HOSPADM

## 2022-06-17 RX ORDER — LAMOTRIGINE 25 MG/1
50 TABLET ORAL 2 TIMES DAILY
Status: DISCONTINUED | OUTPATIENT
Start: 2022-06-17 | End: 2022-07-19 | Stop reason: HOSPADM

## 2022-06-17 RX ORDER — SODIUM CHLORIDE FOR INHALATION 0.9 %
3 VIAL, NEBULIZER (ML) INHALATION EVERY 8 HOURS
Status: DISCONTINUED | OUTPATIENT
Start: 2022-06-17 | End: 2022-06-17

## 2022-06-17 RX ORDER — POTASSIUM CHLORIDE 7.45 MG/ML
10 INJECTION INTRAVENOUS PRN
Status: DISCONTINUED | OUTPATIENT
Start: 2022-06-17 | End: 2022-06-23

## 2022-06-17 RX ORDER — SODIUM CHLORIDE 0.9 % (FLUSH) 0.9 %
10 SYRINGE (ML) INJECTION EVERY 12 HOURS SCHEDULED
Status: DISCONTINUED | OUTPATIENT
Start: 2022-06-17 | End: 2022-07-19 | Stop reason: HOSPADM

## 2022-06-17 RX ORDER — ALBUTEROL SULFATE 2.5 MG/3ML
2.5 SOLUTION RESPIRATORY (INHALATION) EVERY 4 HOURS PRN
Status: DISCONTINUED | OUTPATIENT
Start: 2022-06-17 | End: 2022-07-19 | Stop reason: HOSPADM

## 2022-06-17 RX ORDER — ALBUTEROL SULFATE 90 UG/1
2 AEROSOL, METERED RESPIRATORY (INHALATION) EVERY 6 HOURS PRN
Status: DISCONTINUED | OUTPATIENT
Start: 2022-06-17 | End: 2022-06-17

## 2022-06-17 RX ORDER — PROPAFENONE HYDROCHLORIDE 150 MG/1
150 TABLET, FILM COATED ORAL EVERY 8 HOURS SCHEDULED
Status: DISCONTINUED | OUTPATIENT
Start: 2022-06-17 | End: 2022-07-19 | Stop reason: HOSPADM

## 2022-06-17 RX ORDER — SODIUM CHLORIDE FOR INHALATION 0.9 %
3 VIAL, NEBULIZER (ML) INHALATION EVERY 8 HOURS
Status: DISCONTINUED | OUTPATIENT
Start: 2022-06-17 | End: 2022-06-19

## 2022-06-17 RX ORDER — CASTOR OIL AND BALSAM, PERU 788; 87 MG/G; MG/G
OINTMENT TOPICAL 2 TIMES DAILY
Status: DISCONTINUED | OUTPATIENT
Start: 2022-06-17 | End: 2022-06-17

## 2022-06-17 RX ORDER — NIFEDIPINE 30 MG/1
90 TABLET, EXTENDED RELEASE ORAL DAILY
Status: DISCONTINUED | OUTPATIENT
Start: 2022-06-17 | End: 2022-06-17

## 2022-06-17 RX ORDER — ONDANSETRON 2 MG/ML
4 INJECTION INTRAMUSCULAR; INTRAVENOUS EVERY 6 HOURS PRN
Status: DISCONTINUED | OUTPATIENT
Start: 2022-06-17 | End: 2022-07-19 | Stop reason: HOSPADM

## 2022-06-17 RX ORDER — ALBUTEROL SULFATE 90 UG/1
2 AEROSOL, METERED RESPIRATORY (INHALATION) 2 TIMES DAILY
Status: DISCONTINUED | OUTPATIENT
Start: 2022-06-17 | End: 2022-06-17

## 2022-06-17 RX ORDER — SODIUM CHLORIDE 9 MG/ML
INJECTION, SOLUTION INTRAVENOUS CONTINUOUS
Status: DISCONTINUED | OUTPATIENT
Start: 2022-06-17 | End: 2022-06-20

## 2022-06-17 RX ORDER — SODIUM CHLORIDE 9 MG/ML
INJECTION, SOLUTION INTRAVENOUS PRN
Status: DISCONTINUED | OUTPATIENT
Start: 2022-06-17 | End: 2022-07-19 | Stop reason: HOSPADM

## 2022-06-17 RX ORDER — BUDESONIDE AND FORMOTEROL FUMARATE DIHYDRATE 160; 4.5 UG/1; UG/1
2 AEROSOL RESPIRATORY (INHALATION) 2 TIMES DAILY
Refills: 3 | Status: DISCONTINUED | OUTPATIENT
Start: 2022-06-17 | End: 2022-07-03

## 2022-06-17 RX ORDER — ALBUTEROL SULFATE 2.5 MG/3ML
2.5 SOLUTION RESPIRATORY (INHALATION)
Status: DISCONTINUED | OUTPATIENT
Start: 2022-06-17 | End: 2022-06-18

## 2022-06-17 RX ORDER — LIDOCAINE 50 MG/G
OINTMENT TOPICAL
COMMUNITY

## 2022-06-17 RX ORDER — SODIUM CHLORIDE 0.9 % (FLUSH) 0.9 %
10 SYRINGE (ML) INJECTION PRN
Status: DISCONTINUED | OUTPATIENT
Start: 2022-06-17 | End: 2022-07-19 | Stop reason: HOSPADM

## 2022-06-17 RX ORDER — FUROSEMIDE 10 MG/ML
40 INJECTION INTRAMUSCULAR; INTRAVENOUS ONCE
Status: COMPLETED | OUTPATIENT
Start: 2022-06-17 | End: 2022-06-17

## 2022-06-17 RX ORDER — IPRATROPIUM BROMIDE AND ALBUTEROL SULFATE 2.5; .5 MG/3ML; MG/3ML
1 SOLUTION RESPIRATORY (INHALATION) ONCE
Status: COMPLETED | OUTPATIENT
Start: 2022-06-17 | End: 2022-06-17

## 2022-06-17 RX ORDER — ACETAMINOPHEN 650 MG/1
650 SUPPOSITORY RECTAL EVERY 6 HOURS PRN
Status: DISCONTINUED | OUTPATIENT
Start: 2022-06-17 | End: 2022-07-19 | Stop reason: HOSPADM

## 2022-06-17 RX ORDER — ACETAMINOPHEN 325 MG/1
650 TABLET ORAL EVERY 6 HOURS PRN
Status: DISCONTINUED | OUTPATIENT
Start: 2022-06-17 | End: 2022-07-19 | Stop reason: HOSPADM

## 2022-06-17 RX ORDER — MAGNESIUM SULFATE IN WATER 40 MG/ML
2000 INJECTION, SOLUTION INTRAVENOUS PRN
Status: DISCONTINUED | OUTPATIENT
Start: 2022-06-17 | End: 2022-06-23

## 2022-06-17 RX ORDER — DULOXETIN HYDROCHLORIDE 60 MG/1
60 CAPSULE, DELAYED RELEASE ORAL DAILY
Status: DISCONTINUED | OUTPATIENT
Start: 2022-06-17 | End: 2022-07-19 | Stop reason: HOSPADM

## 2022-06-17 RX ORDER — LIDOCAINE HYDROCHLORIDE 20 MG/ML
15 SOLUTION OROPHARYNGEAL ONCE
Status: COMPLETED | OUTPATIENT
Start: 2022-06-17 | End: 2022-06-17

## 2022-06-17 RX ORDER — MONTELUKAST SODIUM 10 MG/1
10 TABLET ORAL DAILY
Status: DISCONTINUED | OUTPATIENT
Start: 2022-06-17 | End: 2022-07-19 | Stop reason: HOSPADM

## 2022-06-17 RX ORDER — PROMETHAZINE HYDROCHLORIDE 25 MG/1
12.5 TABLET ORAL EVERY 6 HOURS PRN
Status: DISCONTINUED | OUTPATIENT
Start: 2022-06-17 | End: 2022-07-19 | Stop reason: HOSPADM

## 2022-06-17 RX ADMIN — ALBUTEROL SULFATE 2.5 MG: 2.5 SOLUTION RESPIRATORY (INHALATION) at 22:56

## 2022-06-17 RX ADMIN — PIPERACILLIN AND TAZOBACTAM 3375 MG: 3; .375 INJECTION, POWDER, LYOPHILIZED, FOR SOLUTION INTRAVENOUS at 15:23

## 2022-06-17 RX ADMIN — VANCOMYCIN HYDROCHLORIDE 1750 MG: 10 INJECTION, POWDER, LYOPHILIZED, FOR SOLUTION INTRAVENOUS at 09:57

## 2022-06-17 RX ADMIN — AMPICILLIN SODIUM AND SULBACTAM SODIUM 3000 MG: 2; 1 INJECTION, POWDER, FOR SOLUTION INTRAMUSCULAR; INTRAVENOUS at 05:06

## 2022-06-17 RX ADMIN — Medication 2 PUFF: at 08:04

## 2022-06-17 RX ADMIN — LIDOCAINE HYDROCHLORIDE 15 ML: 20 SOLUTION ORAL; TOPICAL at 04:09

## 2022-06-17 RX ADMIN — PIPERACILLIN AND TAZOBACTAM 3375 MG: 3; .375 INJECTION, POWDER, LYOPHILIZED, FOR SOLUTION INTRAVENOUS at 20:00

## 2022-06-17 RX ADMIN — SODIUM CHLORIDE 3000 MG: 900 INJECTION INTRAVENOUS at 09:07

## 2022-06-17 RX ADMIN — SODIUM CHLORIDE, PRESERVATIVE FREE 10 ML: 5 INJECTION INTRAVENOUS at 09:07

## 2022-06-17 RX ADMIN — IPRATROPIUM BROMIDE AND ALBUTEROL SULFATE 1 AMPULE: 2.5; .5 SOLUTION RESPIRATORY (INHALATION) at 01:32

## 2022-06-17 RX ADMIN — DOXYCYCLINE 100 MG: 100 INJECTION, POWDER, LYOPHILIZED, FOR SOLUTION INTRAVENOUS at 05:41

## 2022-06-17 RX ADMIN — SODIUM CHLORIDE: 9 INJECTION, SOLUTION INTRAVENOUS at 09:56

## 2022-06-17 RX ADMIN — FUROSEMIDE 40 MG: 10 INJECTION, SOLUTION INTRAMUSCULAR; INTRAVENOUS at 04:06

## 2022-06-17 RX ADMIN — Medication 2 PUFF: at 19:21

## 2022-06-17 RX ADMIN — Medication 3 ML: at 22:57

## 2022-06-17 RX ADMIN — PROPAFENONE HYDROCHLORIDE 150 MG: 150 TABLET, FILM COATED ORAL at 06:29

## 2022-06-17 ASSESSMENT — PAIN - FUNCTIONAL ASSESSMENT: PAIN_FUNCTIONAL_ASSESSMENT: 0-10

## 2022-06-17 ASSESSMENT — PAIN SCALES - GENERAL: PAINLEVEL_OUTOF10: 2

## 2022-06-17 ASSESSMENT — PAIN DESCRIPTION - LOCATION: LOCATION: THROAT

## 2022-06-17 NOTE — PROGRESS NOTES
Pts 02 requirements increasing. Pt was 83% on 15lHF. Pt placed on Vapotherm 40L/100%. Pt is 93%. Continuing to monitor pt.

## 2022-06-17 NOTE — H&P
lung 9/8/2018    CRI     Detached retina, left     Diverticulosis     DJD (degenerative joint disease)     Edema     chronic    Fatty liver     GERD (gastroesophageal reflux disease)     Hearing loss     Herniated lumbar intervertebral disc     Hx stage 2 sacral decubitus ulcer 4/13/2018    Hyperlipidemia     Hypertension     Hypothyroid 1/27/2015    Morbid obesity with BMI of 40.0-44.9, adult ([facility] [address] 2/10/2017    BETSY treated with BiPAP     Parkinson's disease ([address]     Partial symptomatic epilepsy with complex partial seizures, not intractable, without status epilepticus ([address] 1/24/2020    Restless leg syndrome     Restless legs syndrome     Rheumatic fever 1950    2x IN [place]    Sleep apnea     Tremor, essential 8/16/2010    Type 2 diabetes mellitus without complication ([facility] [address]     Type II or unspecified type diabetes mellitus without mention of complication, not stated as uncontrolled        Past Surgical History:        Procedure Laterality Date    APPENDECTOMY      CAROTID ENDARTERECTOMY      left    CATARACT REMOVAL      CERVICAL FUSION  11/2010    CHOLECYSTECTOMY      COLONOSCOPY N/A 3/8/2020    COLONOSCOPY FLEXIBLE W/ DECOMPRESSION performed by Tanner Lovett MD at [address] (CERVIX STATUS UNKNOWN)      IR MIDLINE CATH  10/8/2021    IR MIDLINE CATH 10/8/2021 [facility]    JOINT REPLACEMENT Bilateral 2003    KNEE SURGERY      replacement x 2    OVARY REMOVAL      SHOULDER SURGERY      TONSILLECTOMY      TUBAL LIGATION      UPPER GASTROINTESTINAL ENDOSCOPY  03/17/2020    Esophagitis    UPPER GASTROINTESTINAL ENDOSCOPY N/A 3/17/2020    EGD performed by Lynette Mckay MD at [facility]         Medications Prior to Admission:    Prior to Admission medications    Medication Sig Start Date End Date Taking?  Authorizing Provider   lidocaine (XYLOCAINE) 5 % ointment Apply topically every 3 hours as needed for Pain Apply topically as needed.    Yes Historical Provider, MD   montelukast (SINGULAIR) 10 MG tablet TAKE 1 TABLET EVERY DAY 5/26/22   MAHAD Lafleur CNP   apixaban (ELIQUIS) 2.5 MG TABS tablet Take 1 tablet by mouth 2 times daily 5/24/22   Jeferson Miranda MD   valsartan (DIOVAN) 80 MG tablet Take 1 tablet by mouth daily 5/24/22   Jeferson Miranda MD   cetirizine (ZYRTEC) 10 MG tablet TAKE 1/2 TABLET BY MOUTH EVERY DAY  Patient not taking: Reported on 6/17/2022 5/23/22   MAHAD Lafleur CNP   albuterol sulfate  (90 Base) MCG/ACT inhaler INHALE 2 PUFFS INTO THE LUNGS EVERY 6 HOURS AS NEEDED FOR WHEEZING 4/14/22   Macel Lolling, APRN - CNP   furosemide (LASIX) 40 MG tablet TAKE 1/2 TABLET EVERY DAY 3/2/22   Jeferson Miranda MD   NIFEdipine (PROCARDIA XL) 90 MG extended release tablet TAKE 1 TABLET EVERY DAY 3/2/22   Nancy Plants, APRN - CNP   metoprolol tartrate (LOPRESSOR) 50 MG tablet TAKE 1 TABLET TWICE DAILY 3/2/22   MAHAD Lafleur CNP   DULoxetine (CYMBALTA) 60 MG extended release capsule TAKE 1 CAPSULE EVERY DAY 3/2/22   MAHAD Lafleur CNP   fluticasone-vilanterol (BREO ELLIPTA) 100-25 MCG/INH AEPB inhaler INHALE 1 PUFF EVERY DAY 12/22/21   Macel LollingMAHAD CNP   atorvastatin (LIPITOR) 40 MG tablet TAKE 1 AND 1/2 TABLETS EVERY DAY 11/4/21   Jeferson Miranda MD   zinc oxide 16 % OINT ointment Apply topically 4 times daily as needed for Dry Skin  Patient not taking: Reported on 6/17/2022 11/3/21   MAHAD Lafleur CNP   propafenone (RYTHMOL) 150 MG tablet TAKE 1 TABLET BY MOUTH EVERY 8 HOURS 11/3/21   Arias Montgomery MD   ferrous sulfate (IRON 325) 325 (65 Fe) MG tablet Take 325 mg by mouth 2 times daily  2/4/21 12/2/21  Historical Provider, MD   fluticasone (FLONASE) 50 MCG/ACT nasal spray USE 2 PUFFS IN [facility] NOSTRIL DAILY 1/8/21   BIJAN Reza carbidopa-levodopa (SINEMET)  MG per tablet Take 1 tablet by mouth 4 times daily    Historical Provider, MD   Alpha-Lipoic Acid 300 MG TABS Take by mouth daily    Historical Provider, MD   lamoTRIgine (LAMICTAL) 25 MG tablet Take 50 mg by mouth 2 times daily     Historical Provider, MD   Handicap Placard MISC by Does not apply route DX: Rosa Mast  parkinson's disease  Exp: 9/1/2024 9/27/19   BIJAN Lee   ipratropium-albuterol (DUONEB) 0.5-2.5 (3) MG/3ML SOLN nebulizer solution Inhale 3 mLs into the lungs every 6 hours as needed for Shortness of Breath DX: J45.40 2/6/19   BIJAN Lee   Multiple Vitamins-Minerals (THERAPEUTIC MULTIVITAMIN-MINERALS) tablet Take 1 tablet by mouth daily    Historical Provider, MD   solifenacin (VESICARE) 5 MG tablet Take 1 tablet by mouth daily. 6/25/13 10/29/13  Kathrin Bruno MD       Allergies:  Levaquin [levofloxacin], Metoclopramide, Phenazopyridine, Pyridium [phenazopyridine hcl], Reglan [metoclopramide hcl], and Reglan [metoclopramide hcl]    Social History:  The patient currently lives at home. TOBACCO:   reports that she has never smoked. She has never used smokeless tobacco.  ETOH:   reports no history of alcohol use. Family History:  Reviewed in detail and negative for DM, Early CAD, Cancer, CVA. Positive as follows:        Problem Relation Age of Onset    Diabetes Mother     Heart Disease Mother     Diabetes Father     Heart Disease Father     Diabetes Sister        REVIEW OF SYSTEMS:   As noted in the HPI. All other systems reviewed and negative. PHYSICAL EXAM:    /65   Pulse 76   Temp 97.3 °F (36.3 °C) (Oral)   Resp 22   Ht 5' 6\" (1.676 m)   Wt 255 lb (115.7 kg)   SpO2 92%   BMI 41.16 kg/m²     General appearance: ill appearing in mod resp distress this am.   HEENT Normal cephalic, atraumatic without obvious deformity. Pupils equal, round, and reactive to light. Extra ocular muscles intact.   Conjunctivae/corneas clear.  Neck: Supple, No jugular venous distention/bruits. Trachea midline without thyromegaly or adenopathy with full range of motion. Lungs: diminished breath sounds with coarse upper airway sounds. Heart: Regular rate and rhythm with Normal S1/S2 without murmurs, rubs or gallops, point of maximum impulse non-displaced  Abdomen: Soft, non-tender or non-distended without rigidity or guarding and positive bowel sounds all four quadrants. Extremities: No clubbing, cyanosis, trace edema bilaterally. Skin: Skin color, texture, turgor normal.  No rashes or lesions. Neurologic: Alert and oriented X 3, + resting pill rolling tremor. Very weak cough. Mental status: Alert, oriented, thought content appropriate. Capillary Refill: Acceptable  < 3 seconds  Peripheral Pulses: +3 Easily felt, not easily obliterated with pressure        CBC   Recent Labs     06/17/22 0310   WBC 21.2*   HGB 9.5*   HCT 29.4*         RENAL  Recent Labs     06/17/22 0310      K 5.0   CL 98*   CO2 28   BUN 40*   CREATININE 1.6*     LFT'S  Recent Labs     06/17/22 0310   AST 15   ALT <5*   BILITOT <0.2   ALKPHOS 104     COAG  No results for input(s): INR in the last 72 hours.   CARDIAC ENZYMES  Recent Labs     06/17/22 0310   TROPONINI <0.01       U/A:    Lab Results   Component Value Date    NITRITE neg 03/09/2011    COLORU Yellow 09/27/2021    WBCUA 2 11/15/2021    RBCUA 3 11/15/2021    MUCUS 3+ 02/27/2017    BACTERIA 4+ 09/27/2021    CLARITYU CLOUDY 09/27/2021    SPECGRAV 1.015 09/27/2021    LEUKOCYTESUR LARGE 09/27/2021    BLOODU Negative 09/27/2021    GLUCOSEU Negative 09/27/2021       ABG    Lab Results   Component Value Date    JAL6RNP 27.1 04/25/2017    BEART 0.0 04/25/2017    K5CQGSBA 96.8 04/25/2017    PHART 7.296 04/25/2017    BMY7CEP 56.8 04/25/2017    PO2ART 95.8 04/25/2017    JKN5TCX 28.8 04/25/2017           Active Hospital Problems    Diagnosis Date Noted    Shortness of breath [R06.02] 06/17/2022 Priority: Medium         PHYSICIANS CERTIFICATION:    I certify that Roxanne Padilla is expected to be hospitalized for more than 2 midnights based on the following assessment and plan:      ASSESSMENT/PLAN:      Acute Hypox Resp Failure. Aspiration PNA. Oxygen requirements increased rapidly from 2l/min to NRB to vapotherm  Tx to ICU this am.   Abx to Vanc and Unasyn for strong suspect aspiration PNA. Stop nifedipine - BP borderline low and leg edema. Swab for MRSA  Check Procal  Check Resp Culture. Add 3% saline neb. Ask for pulm consult - may need bronchoscopy. Repeat CXR. Sepsis POA - due to above. Dysphagia. Abnormal CT  Cough with every meal intake per family report. Underlying Parkinson's. I ordered for MBS and esophagram, though these will unlikely be done till she is better clinically. Keep strict NPO at this time. IVF support. CKD IIIB  Cr slight better then baseline actually. Clinically does not appear fluid overloaded. Gentle IVF while NPO       Parkinson's on Sinemet. Afib Paroxysmal.   On eliquis  Rythmol          HTN - BP on the low side. Nifedipine stopped due to this and also leg edema. Consider alternative when restarting. DVT Prophylaxis: eliquis  Diet: Diet NPO  Code Status: Full Code    Dispo - to ICU. >30min of CC time due to rapidly worsening oxygenation. Anoop Burton MD    Thank you Mercy Bashir MD for the opportunity to be involved in this patient's care. If you have any questions or concerns please feel free to contact me at [phone].

## 2022-06-17 NOTE — PROGRESS NOTES
Hand off report give to Kostas Nayak RN. Patient is stable showing no signs of distress and has no current needs at this time. Call light is in reach and bed is in lowest position. Care is transferred at this time.

## 2022-06-17 NOTE — ED TRIAGE NOTES
Patient states she has been coughing the last two days does wear bipap at night, called ems for shortness of breath, patient was in the 80s upon their arrival, used NRB, patient arrived here at 88% on RA, now 97% on 2L of o2.

## 2022-06-17 NOTE — PROGRESS NOTES
11:15 Pt transfer to ICU for increased oxygen demand , pt on vapotherm, pt awake a&o x4, pt stablle @ time of transfer  11:30 Pt awake a&o x4, vss assessment as charted , pt stable  on vapotherm  15:34 Pt remains awake a&o x4, VS remain stable , re assessment unchanged and as charted   19:00 Pt remains awake a&o x4, handoff report given to night nurse [place]  , pt stable @ handoff on vapotherm

## 2022-06-17 NOTE — ED PROVIDER NOTES
with complex partial seizures, not intractable, without status epilepticus ([address] 1/24/2020    Restless leg syndrome     Restless legs syndrome     Rheumatic fever 1948    2x IN [place]    Sleep apnea     Tremor, essential 8/16/2010    Type 2 diabetes mellitus without complication (HCC)     Type II or unspecified type diabetes mellitus without mention of complication, not stated as uncontrolled      Past Surgical History:   Procedure Laterality Date    APPENDECTOMY      CAROTID ENDARTERECTOMY      left    CATARACT REMOVAL      CERVICAL FUSION  11/2010    CHOLECYSTECTOMY      COLONOSCOPY N/A 3/8/2020    COLONOSCOPY FLEXIBLE W/ DECOMPRESSION performed by Jimmy Parmar MD at [address] (CERVIX STATUS UNKNOWN)      IR MIDLINE CATH  10/8/2021    IR MIDLINE CATH 10/8/2021 [facility]    JOINT REPLACEMENT Bilateral 2003    KNEE SURGERY      replacement x 2    OVARY REMOVAL      SHOULDER SURGERY      TONSILLECTOMY      TUBAL LIGATION      UPPER GASTROINTESTINAL ENDOSCOPY  03/17/2020    Esophagitis    UPPER GASTROINTESTINAL ENDOSCOPY N/A 3/17/2020    EGD performed by Lorenzo Dumont MD at [facility]       Family History   Problem Relation Age of Onset    Diabetes Mother     Heart Disease Mother     Diabetes Father     Heart Disease Father     Diabetes Sister      Social History     Socioeconomic History    Marital status:      Spouse name: Shavonne Graves Number of children: 2    Years of education: 15    Highest education level: Not on file   Occupational History    Not on file   Tobacco Use    Smoking status: Never Smoker    Smokeless tobacco: Never Used   Vaping Use    Vaping Use: Never used   Substance and Sexual Activity    Alcohol use: No    Drug use: No    Sexual activity: Not Currently   Other Topics Concern    Not on file   Social History Narrative    ** Merged History Encounter **          Social Determinants of Health     Financial Resource Strain:     Difficulty of Paying Living Expenses: Not on file   Food Insecurity:     Worried About Running Out of Food in the Last Year: Not on file    Deja of Food in the Last Year: Not on file   Transportation Needs:     Lack of Transportation (Medical): Not on file    Lack of Transportation (Non-Medical):  Not on file   Physical Activity:     Days of Exercise per Week: Not on file    Minutes of Exercise per Session: Not on file   Stress:     Feeling of Stress : Not on file   Social Connections:     Frequency of Communication with Friends and Family: Not on file    Frequency of Social Gatherings with Friends and Family: Not on file    Attends Synagogue Services: Not on file    Active Member of [address] [place] [address] [employer] or Organizations: Not on file    Attends Club or Organization Meetings: Not on file    Marital Status: Not on file   Intimate Partner Violence:     Fear of Current or Ex-Partner: Not on file    Emotionally Abused: Not on file    Physically Abused: Not on file    Sexually Abused: Not on file   Housing Stability: [address] Unable to Pay for Housing in the Last Year: No    Number of [place] in the Last Year: 1    Unstable Housing in the Last Year: No     Current Facility-Administered Medications   Medication Dose Route Frequency Provider Last Rate Last Admin    ampicillin-sulbactam (UNASYN) 3000 mg in 100 mL NS IVPB minibag  3,000 mg IntraVENous Once [employer], DO        doxycycline (VIBRAMYCIN) 100 mg in dextrose 5 % 100 mL IVPB  100 mg IntraVENous Once [employer], DO        sodium chloride flush 0.9 % injection 10 mL  10 mL IntraVENous 2 times per day Glen Sullivan, DO        sodium chloride flush 0.9 % injection 10 mL  10 mL IntraVENous PRN Michael Sullivan, DO        0.9 % sodium chloride infusion   IntraVENous PRN Michael Sullivan, DO        potassium chloride 10 mEq/100 mL IVPB (Peripheral Line)  10 mEq IntraVENous PRN Shelley Leon DO        magnesium sulfate 2000 mg in 50 mL IVPB premix  2,000 mg IntraVENous PRN Jodiosvelt Abbot JALEESA Sullivan DO        promethazine (PHENERGAN) tablet 12.5 mg  12.5 mg Oral Q6H PRN Michael Sullivan DO        Or    ondansetron (ZOFRAN) injection 4 mg  4 mg IntraVENous Q6H PRN Kacie Sullivan,         acetaminophen (TYLENOL) tablet 650 mg  650 mg Oral Q6H PRN Michael Sullivan DO        Or    acetaminophen (TYLENOL) suppository 650 mg  650 mg Rectal Q6H PRN Michael Sullivan DO        albuterol sulfate HFA (PROVENTIL;VENTOLIN;PROAIR) 108 (90 Base) MCG/ACT inhaler 2 puff  2 puff Inhalation Q6H PRN Shelley Leon DO        apixaban (ELIQUIS) tablet 2.5 mg  2.5 mg Oral BID Michael Sullivan DO        atorvastatin (LIPITOR) tablet 40 mg  40 mg Oral Nightly Michael Sullivan DO        carbidopa-levodopa (SINEMET)  MG per tablet 1 tablet  1 tablet Oral 4x Daily Michael Sullivan DO        DULoxetine (CYMBALTA) extended release capsule 60 mg  60 mg Oral Daily Nicmasulaiman Sullivan DO        budesonide-formoterol (SYMBICORT) 160-4.5 MCG/ACT inhaler 2 puff  2 puff Inhalation BID Michael Sullivan DO        lamoTRIgine (LAMICTAL) tablet 50 mg  50 mg Oral BID Michael Sullivan DO        montelukast (SINGULAIR) tablet 10 mg  1 tablet Oral Daily Michael Sullivan DO        NIFEdipine (PROCARDIA XL) extended release tablet 90 mg  1 tablet Oral Daily Michael Sullivan DO        propafenone (RYTHMOL) tablet 150 mg  150 mg Oral 3 times per day Shelley Leon,          Current Outpatient Medications   Medication Sig Dispense Refill    lidocaine (XYLOCAINE) 5 % ointment Apply topically every 3 hours as needed for Pain Apply topically as needed.       montelukast (SINGULAIR) 10 MG tablet TAKE 1 TABLET EVERY DAY 90 tablet 1    apixaban (ELIQUIS) 2.5 MG TABS tablet Take 1 tablet by mouth 2 times daily 180 tablet 3    valsartan (DIOVAN) 80 MG tablet Take 1 tablet by mouth daily      cetirizine (ZYRTEC) 10 MG tablet TAKE 1/2 TABLET BY MOUTH EVERY DAY (Patient not taking: Reported on 6/17/2022) 15 tablet 0    albuterol sulfate  (90 Base) MCG/ACT inhaler INHALE 2 PUFFS INTO THE LUNGS EVERY 6 HOURS AS NEEDED FOR WHEEZING 3 each 2    furosemide (LASIX) 40 MG tablet TAKE 1/2 TABLET EVERY DAY 45 tablet 1    NIFEdipine (PROCARDIA XL) 90 MG extended release tablet TAKE 1 TABLET EVERY DAY 90 tablet 1    metoprolol tartrate (LOPRESSOR) 50 MG tablet TAKE 1 TABLET TWICE DAILY 180 tablet 1    DULoxetine (CYMBALTA) 60 MG extended release capsule TAKE 1 CAPSULE EVERY DAY 90 capsule 1    fluticasone-vilanterol (BREO ELLIPTA) 100-25 MCG/INH AEPB inhaler INHALE 1 PUFF EVERY DAY 3 each 3    atorvastatin (LIPITOR) 40 MG tablet TAKE 1 AND 1/2 TABLETS EVERY  tablet 3    zinc oxide 16 % OINT ointment Apply topically 4 times daily as needed for Dry Skin (Patient not taking: Reported on 6/17/2022) 50 g 1    propafenone (RYTHMOL) 150 MG tablet TAKE 1 TABLET BY MOUTH EVERY 8 HOURS 270 tablet 3    ferrous sulfate (IRON 325) 325 (65 Fe) MG tablet Take 325 mg by mouth 2 times daily       fluticasone (FLONASE) 50 MCG/ACT nasal spray USE 2 PUFFS IN EACH NOSTRIL DAILY 3 Bottle 10    carbidopa-levodopa (SINEMET)  MG per tablet Take 1 tablet by mouth 4 times daily      Alpha-Lipoic Acid 300 MG TABS Take by mouth daily      lamoTRIgine (LAMICTAL) 25 MG tablet Take 50 mg by mouth 2 times daily       Handicap Placard MISC by Does not apply route DX: G20  parkinson's disease  Exp: 9/1/2024 1 each 0    ipratropium-albuterol (DUONEB) 0.5-2.5 (3) MG/3ML SOLN nebulizer solution Inhale 3 mLs into the lungs every 6 hours as needed for Shortness of Breath DX: J45.40 360 mL 1    Multiple Vitamins-Minerals (THERAPEUTIC MULTIVITAMIN-MINERALS) tablet Take 1 tablet by mouth daily       Allergies   Allergen Reactions    Levaquin [Levofloxacin]      Pt was given in er and developed hives and redness    Metoclopramide     Phenazopyridine     Pyridium [Phenazopyridine Hcl]     Reglan [Metoclopramide Hcl]     Reglan [Metoclopramide Hcl]      tremors       REVIEW OF SYSTEMS  10 systems reviewed, pertinent positives per HPI otherwise noted to be negative. PHYSICAL EXAM  BP (!) 121/107   Pulse 83   Temp 97.9 °F (36.6 °C) (Oral)   Resp (!) 35   Ht 5' 6\" (1.676 m)   Wt 255 lb (115.7 kg)   SpO2 93%   BMI 41.16 kg/m²    Physical exam:  General appearance: awake and cooperative. no distress. Ill appearing. Skin: Warm and dry. No rashes or lesions. HENT: Normocephalic. Atraumatic. Mucus membranes are dry  Neck: supple  Eyes: right pupil 3mm and reactive; left eye blind. EOM intact right eye   Heart: RRR. No murmurs. Lungs: tachypneic without accessory muscle use; no conversational dyspnea; diffuse rhonchi present with moist cough. fair air exchange   Abdomen: No tenderness. Soft. Non distended. No peritoneal signs. Musculoskeletal: 3+ pitting edema bilateral lower extremities. Compartments soft. No deformity. No tenderness in the extremities. All extremities neurovascularly intact. Radial, Dp, and PT pulses +2/4 bilaterally  Neurological: Alert and oriented. No focal deficits. No aphasia or dysarthria. Psychiatric: Normal mood and affect. LABS  I have reviewed all labs for this visit.    Results for orders placed or performed during the hospital encounter of 06/17/22   COVID-19 & Influenza Combo    Specimen: Nasopharyngeal Swab   Result Value Ref Range    SARS-CoV-2 RNA, RT PCR NOT DETECTED NOT DETECTED    INFLUENZA A NOT DETECTED NOT DETECTED    INFLUENZA B NOT DETECTED NOT DETECTED   CBC with Auto Differential   Result Value Ref Range    WBC 21.2 (H) 4.0 - 11.0 K/uL    RBC 3.38 (L) 4.00 - 5.20 M/uL    Hemoglobin 9.5 (L) 12.0 - 16.0 g/dL    Hematocrit 29.4 (L) 36.0 - 48.0 %    MCV 87.1 80.0 - 100.0 fL    MCH 28.1 26.0 - 34.0 pg    MCHC 32.3 31.0 - 36.0 g/dL    RDW 13.9 12.4 - 15.4 % Platelets 669 362 - 834 K/uL    MPV 7.8 5.0 - 10.5 fL    Neutrophils % 88.6 %    Lymphocytes % 4.7 %    Monocytes % 5.9 %    Eosinophils % 0.6 %    Basophils % 0.2 %    Neutrophils Absolute 18.8 (H) 1.7 - 7.7 K/uL    Lymphocytes Absolute 1.0 1.0 - 5.1 K/uL    Monocytes Absolute 1.3 0.0 - 1.3 K/uL    Eosinophils Absolute 0.1 0.0 - 0.6 K/uL    Basophils Absolute 0.0 0.0 - 0.2 K/uL   Comprehensive Metabolic Panel w/ Reflex to MG   Result Value Ref Range    Sodium 137 136 - 145 mmol/L    Potassium reflex Magnesium 5.0 3.5 - 5.1 mmol/L    Chloride 98 (L) 99 - 110 mmol/L    CO2 28 21 - 32 mmol/L    Anion Gap 11 3 - 16    Glucose 118 (H) 70 - 99 mg/dL    BUN 40 (H) 7 - 20 mg/dL    CREATININE 1.6 (H) 0.6 - 1.2 mg/dL    GFR Non- 31 (A) >60    GFR  38 (A) >60    Calcium 10.0 8.3 - 10.6 mg/dL    Total Protein 6.3 (L) 6.4 - 8.2 g/dL    Albumin 3.9 3.4 - 5.0 g/dL    Albumin/Globulin Ratio 1.6 1.1 - 2.2    Total Bilirubin <0.2 0.0 - 1.0 mg/dL    Alkaline Phosphatase 104 40 - 129 U/L    ALT <5 (L) 10 - 40 U/L    AST 15 15 - 37 U/L   Troponin   Result Value Ref Range    Troponin <0.01 <0.01 ng/mL   Brain Natriuretic Peptide   Result Value Ref Range    Pro- (H) 0 - 449 pg/mL   Blood Gas, Venous   Result Value Ref Range    pH, [place] 7.332 (L) 7.350 - 7.450    pCO2, [place] 54.9 (H) 40.0 - 50.0 mmHg    pO2, [place] 45.9 (H) 25.0 - 40.0 mmHg    HCO3, Venous 28.4 23.0 - 29.0 mmol/L    Base Excess, [place] 1.7 -3.0 - 3.0 mmol/L    O2 Sat, [place] 78 Not Established %    Carboxyhemoglobin 0.9 0.0 - 1.5 %    MetHgb, [place] 0.3 <1.5 %    TC02 (Calc), [place] 30 Not Established mmol/L    O2 Therapy Unknown    Lactate, Sepsis   Result Value Ref Range    Lactic Acid, Sepsis 1.5 0.4 - 1.9 mmol/L       ECG  The Ekg interpreted by me shows  Normal sinus rhythm with a rate of 69  Axis is   Normal  QTc is  within an acceptable range  Intervals and Durations are unremarkable.       ST Segments: nonspecific ST abnormality no acute ischemia, poor baseline   No significant change from prior EKG dated 12/20/21    RADIOLOGY  CT CHEST WO CONTRAST    Result Date: 6/17/2022  EXAMINATION: CT OF THE CHEST WITHOUT CONTRAST 6/17/2022 3:56 am TECHNIQUE: CT of the chest was performed without the administration of intravenous contrast. Multiplanar reformatted images are provided for review. Automated exposure control, iterative reconstruction, and/or weight based adjustment of the mA/kV was utilized to reduce the radiation dose to as low as reasonably achievable. COMPARISON: 12/21/2021 HISTORY: ORDERING SYSTEM PROVIDED HISTORY: short of breath TECHNOLOGIST PROVIDED HISTORY: Reason for exam:->short of breath Decision Support Exception - unselect if not a suspected or confirmed emergency medical condition->Emergency Medical Condition (MA) Reason for Exam: wet rattling raspy breating FINDINGS: Mediastinum: Cardiac chambers are not enlarged and no pericardial effusion. Moderate atherosclerotic calcifications of the thoracic aorta and origin of the great branches but no aneurysmal dilatation. Nonenlarged mediastinal lymph nodes but no bulky lymphadenopathy. The trachea has decreased AP diameter with inter bulging of the membranous portion with gas-filled but nondilated thoracic esophagus. Lungs/pleura: Areas of respiratory motion in the lower lungs. Linear and dependent atelectasis in the right lower lobe. Left lower lobe also has dependent atelectasis but cannot exclude some patchy airspace disease. Evaluation for secretions in the distal lower lobe bronchi is limited by the respiratory motion. Calcified granuloma in the superior segment left lower lobe. The mid upper lungs are clear except for some apical scarring. There is no pleural effusion or pneumothorax on either side. Upper Abdomen: Only a small portion of the upper abdomen is included. There is no free air in the included upper abdomen.  Soft Tissues/Bones: No soft tissue emphysema or focal fluid collection is seen at the chest wall. No acute fracture or destruction is identified. Anterior cervical fusion hardware is partially included. The degenerative changes along the spine are stable. .     1.  Respiratory motion in the lower lungs. The right base has linear and dependent atelectasis. The left does have atelectatic changes but could also have some patchy opacities from pneumonia or aspiration at the left base. 2.  There is no pleural effusion or pneumothorax. 3.  Narrowing of the AP diameter of the trachea with bulging of the membranous portion due to the gas distended but nondilated esophagus. XR CHEST PORTABLE    Result Date: 6/17/2022  EXAMINATION: ONE XRAY VIEW OF THE CHEST 6/17/2022 1:35 am COMPARISON: April 28, 2022. HISTORY: ORDERING SYSTEM PROVIDED HISTORY: short of breath TECHNOLOGIST PROVIDED HISTORY: Reason for exam:->short of breath Reason for Exam: sob FINDINGS: Frontal portable view of the chest.  Normal lung volume. Bibasilar heterogeneous opacities. No lobar consolidation. Indistinct pulmonary vasculature. No pleural effusion or pneumothorax. Cardiomegaly. Atherosclerotic thoracic aorta. Multilevel degenerative disc disease. Degenerative changes in the bilateral shoulders. Lower cervical spine fusion hardware. 1.  Bibasilar heterogeneous opacities may represent subsegmental atelectasis and/or mild pulmonary edema. Underlying infection is difficult to exclude. Short-term follow-up PA and lateral chest radiographs may be helpful for further evaluation. 2.  Cardiomegaly. No results found. Is this patient to be included in the SEP-1 Core Measure due to severe sepsis or septic shock? Yes   SEP-1 CORE MEASURE DATA      Sepsis Criteria   Severe Sepsis Criteria   Septic Shock Criteria     Must be confirmed or suspected to move forward with diagnosis of sepsis.     Must meet 2:    [] Temperature > 100.9 F (38.3 C)        or < 96.8 F (36 C)  [] HR > 90  [x] RR > 20  [x] WBC > 12 or < 4 or 10% bands      AND:      [x] Infection Confirmed or        Suspected. Must meet 1:    [] Lactate > 2       or   [] Signs of Organ Dysfunction:    - SBP < 90 or MAP < 65  - Altered mental status  - Creatinine > 2 or increased from      baseline  - Urine Output < 0.5 ml/kg/hr  - Bilirubin > 2  - INR > 1.5 (not anticoagulated)  - Platelets < 652,853  - Acute Respiratory Failure as     evidenced by new need for NIPPV     or mechanical ventilation      [] No criteria met for Severe Sepsis. Must meet 1:    [] Lactate > 4        or   [] SBP < 90 or MAP < 65 for at        least two readings in the first        hour after fluid bolus        administration      [] Vasopressors initiated (if hypotension persists after fluid resuscitation)        [] No criteria met for Septic Shock. Patient Vitals for the past 6 hrs:   BP Temp Pulse Resp SpO2 Height Weight Weight Method Percent Weight Change   06/17/22 0056 109/88 97.9 °F (36.6 °C) 70 19 (!) 88 % 5' 6\" (1.676 m) 255 lb (115.7 kg) Stated 0   06/17/22 0059 -- -- -- -- 97 % -- -- -- --   06/17/22 0127 -- -- 70 -- -- -- -- -- --   06/17/22 0231 (!) 144/51 -- 80 29 94 % -- -- -- --   06/17/22 0403 126/80 -- 82 16 92 % -- -- -- --   06/17/22 0408 (!) 121/107 -- 83 (!) 35 93 % -- -- -- --      Recent Labs     06/17/22  0310   WBC 21.2*   CREATININE 1.6*   BILITOT <0.2            Time sepsis Identified: [id number]    Fluid Resuscitation Rational: patient not hypotensive     Repeat lactate level: not indicated due to initial lactate < 2    Reassessment Exam:   Not applicable. Patient does not have septic shock. ED COURSE/MDM  Patient seen and evaluated. Old records reviewed. Labs and imaging reviewed and results discussed with patient. This is a 82-year-old female who presents to the ED with shortness of breath and cough.   She is 88% on arrival, mildly tachypneic but no significant work of breathing she is placed on 2 L nasal cannula and appears comfortable with this. She has diffuse rhonchi, and a moist cough. She was difficult to obtain IV access, clinical did obtain an IV and blood work was sent. Patient was given a DuoNeb. She has a leukocytosis of 21.2. Lactate is normal.  Blood cultures were drawn, and initiated antibiotics. Her chest x-ray was not really clear as far as an infiltrate however clinically I did suspect that aspiration pneumonia so she was given Unasyn and also doxycycline for community-acquired organism. I did give her a dose of Lasix and she had leg swelling, however based on work-up she does not necessarily appear to be in CHF exacerbation. Her EKG is nonischemic and troponin is negative. She has CKD as well as chronic anemia. She remained hemodynamically stable here in the ED she will be admitted for further treatment. The total Critical Care time is 38 minutes which excludes separately billable procedures.       During the patient's ED course, the patient was given:  Medications   ampicillin-sulbactam (UNASYN) 3000 mg in 100 mL NS IVPB minibag (has no administration in time range)   doxycycline (VIBRAMYCIN) 100 mg in dextrose 5 % 100 mL IVPB (has no administration in time range)   sodium chloride flush 0.9 % injection 10 mL (has no administration in time range)   sodium chloride flush 0.9 % injection 10 mL (has no administration in time range)   0.9 % sodium chloride infusion (has no administration in time range)   potassium chloride 10 mEq/100 mL IVPB (Peripheral Line) (has no administration in time range)   magnesium sulfate 2000 mg in 50 mL IVPB premix (has no administration in time range)   promethazine (PHENERGAN) tablet 12.5 mg (has no administration in time range)     Or   ondansetron (ZOFRAN) injection 4 mg (has no administration in time range)   acetaminophen (TYLENOL) tablet 650 mg (has no administration in time range)     Or   acetaminophen (TYLENOL) suppository 650 mg (has no administration in time range)   albuterol sulfate HFA (PROVENTIL;VENTOLIN;PROAIR) 108 (90 Base) MCG/ACT inhaler 2 puff (has no administration in time range)   apixaban (ELIQUIS) tablet 2.5 mg (has no administration in time range)   atorvastatin (LIPITOR) tablet 40 mg (has no administration in time range)   carbidopa-levodopa (SINEMET)  MG per tablet 1 tablet (has no administration in time range)   DULoxetine (CYMBALTA) extended release capsule 60 mg (has no administration in time range)   budesonide-formoterol (SYMBICORT) 160-4.5 MCG/ACT inhaler 2 puff (has no administration in time range)   lamoTRIgine (LAMICTAL) tablet 50 mg (has no administration in time range)   montelukast (SINGULAIR) tablet 10 mg (has no administration in time range)   NIFEdipine (PROCARDIA XL) extended release tablet 90 mg (has no administration in time range)   propafenone (RYTHMOL) tablet 150 mg (has no administration in time range)   ipratropium-albuterol (DUONEB) nebulizer solution 1 ampule (1 ampule Inhalation Given 6/17/22 0132)   lidocaine viscous hcl (XYLOCAINE) 2 % solution 15 mL (15 mLs Mouth/Throat Given 6/17/22 0409)   furosemide (LASIX) injection 40 mg (40 mg IntraVENous Given 6/17/22 0406)        CLINICAL IMPRESSION  1. Acute respiratory failure with hypoxia ([address]    2. Aspiration pneumonia of left lower lobe, unspecified aspiration pneumonia type ([address]    3. Septicemia ([address]    4. Chronic kidney disease, unspecified CKD stage    5. Chronic anemia        Blood pressure (!) 121/107, pulse 83, temperature 97.9 °F (36.6 °C), temperature source Oral, resp. rate (!) 35, height 5' 6\" (1.676 m), weight 255 lb (115.7 kg), SpO2 93 %, not currently breastfeeding. Patient was given scripts for the following medications. I counseled patient how to take these medications. New Prescriptions    No medications on file       Follow-up with:  No follow-up provider specified. DISCLAIMER: This chart was created using Dragon dictation software.   Efforts were made by me to ensure accuracy, however some errors may be present due to limitations of this technology and occasionally words are not transcribed correctly.        Cheng Can  06/17/22 9897

## 2022-06-17 NOTE — FLOWSHEET NOTE
6.17,22Prayed with spouse and patient at bedside and talked. 06/17/22 1352   Encounter Summary   Encounter Overview/Reason  Initial Encounter   Service Provided For: Patient and family together   Referral/Consult From: [place]   Last Encounter  06/17/22   Begin Time 1310   End Time  1325   Total Time Calculated 15 min   Spiritual/Emotional needs   Type Spiritual Support   Assessment/Intervention/Outcome   Assessment Coping; Hopeful;Peaceful   Intervention Active listening;Discussed meaning/purpose;Discussed relationship with God;Nurtured Hope;Prayer (assurance of)/[place];Sustaining Presence/Ministry of presence;Read/Provided Scripture   Outcome Acceptance;Comfort;Encouraged;Engaged in conversation;Expressed Gratitude;Peace; Optimistic  (Prayed with spouse and patient at bedside and talked.)

## 2022-06-17 NOTE — FLOWSHEET NOTE
06/17/22 0600   Vital Signs   Temp 98.7 °F (37.1 °C)   Temp Source Oral   Heart Rate 82   Heart Rate Source Monitor   Resp 21   BP (!) 123/39   BP Location Right upper arm   MAP (Calculated) 67   Patient Position Semi fowlers   Level of Consciousness Alert (0)   MEWS Score 2   Oxygen Therapy   SpO2 92 %   O2 Device Nasal cannula   O2 Flow Rate (L/min) 2 L/min     Patient arrived via bed from ED on 2L NC. [place] placed. FYI to hospitalist for bp.

## 2022-06-17 NOTE — CONSULTS
Pulmonary [facility]                             Pulmonary Consult/Progress Note :          Patient: Yamini Hall  MRN: [id number]  : 1940      Date of Admission: .2022 12:50 Seth Moore MD        Reason for Consultation: Critical care management  CC :  Worsening shortness of breath and respiratory distress       HPI:   Yamini Hall is a 80y.o. year old with history of Parkinson disease, history of A. fib on Eliquis, obstructive sleep apnea who lives at home with  presented with worsening shortness of breath and cough    She was admitted to the stepdown unit where her breathing get worse and she become hypoxic which she required 2 L in the beginning and that has increased up to need of Vapotherm at 40 L with FiO2 100%    She had a chest x-ray that shows a slight congestion bibasilarly however per staff there is high concern that she aspirated    She got worse over the last few hours from admission and then she was transferred to the ICU where she was on Vapotherm with no acute distress she also was given 3% saline and chest x-ray was ordered    At this time she denies any chest pain however she still require high oxygen she is awake alert oriented follow commands with no issue      PAST MEDICAL HISTORY:   Past Medical History:   Diagnosis Date    Anemia, unspecified     neg bone marrow    Asthma     Atrial fibrillation ([address]     Baker's cyst     Basal cell carcinoma of left cheek 3/12/2019    Blind left eye     Carotid artery stenoses     Carotid stenosis 2002    left    Cervical spinal cord compression ([address] 2010    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-sided sciatica 2016    CKD (chronic kidney disease) stage 3, GFR 30-59 ml/min ([address]     Community acquired pneumonia of left lower lobe of lung 2018    CRI     Detached retina, left     Diverticulosis     DJD (degenerative joint disease)     Edema     chronic    Fatty liver     GERD (gastroesophageal reflux disease)     Hearing loss     Herniated lumbar intervertebral disc     Hx stage 2 sacral decubitus ulcer 4/13/2018    Hyperlipidemia     Hypertension     Hypothyroid 1/27/2015    Morbid obesity with BMI of 40.0-44.9, adult ([address] 2/10/2017    BETSY treated with BiPAP     Parkinson's disease ([address]     Partial symptomatic epilepsy with complex partial seizures, not intractable, without status epilepticus ([address] 1/24/2020    Restless leg syndrome     Restless legs syndrome     Rheumatic fever 1950    2x IN [place]    Sleep apnea     Tremor, essential 8/16/2010    Type 2 diabetes mellitus without complication ([address]     Type II or unspecified type diabetes mellitus without mention of complication, not stated as uncontrolled        PAST SURGICAL HISTORY:   Past Surgical History:   Procedure Laterality Date    APPENDECTOMY      CAROTID ENDARTERECTOMY      left    CATARACT REMOVAL      CERVICAL FUSION  11/2010    CHOLECYSTECTOMY      COLONOSCOPY N/A 3/8/2020    COLONOSCOPY FLEXIBLE W/ DECOMPRESSION performed by Yonny Prather MD at [address] (CERVIX STATUS UNKNOWN)      IR MIDLINE CATH  10/8/2021    IR MIDLINE CATH 10/8/2021 [facility]    JOINT REPLACEMENT Bilateral 2003    KNEE SURGERY      replacement x 2    OVARY REMOVAL      SHOULDER SURGERY      TONSILLECTOMY      TUBAL LIGATION      UPPER GASTROINTESTINAL ENDOSCOPY  03/17/2020    Esophagitis    UPPER GASTROINTESTINAL ENDOSCOPY N/A 3/17/2020    EGD performed by Ivett Hawk MD at [facility] HISTORY:   Family History   Problem Relation Age of Onset    Diabetes Mother     Heart Disease Mother     Diabetes Father     Heart Disease Father     Diabetes Sister        SOCIAL HISTORY:   Social History     Socioeconomic History    Marital status:      Spouse name: Anny    Number of children: 2    Years of education: 15    Highest education level: Not on file   Occupational History    Not on file   Tobacco Use    Smoking status: Never Smoker    Smokeless tobacco: Never Used   Vaping Use    Vaping Use: Never used   Substance and Sexual Activity    Alcohol use: No    Drug use: No    Sexual activity: Not Currently   Other Topics Concern    Not on file   Social History Narrative    ** Merged History Encounter **          Social Determinants of Health     Financial Resource Strain:     Difficulty of Paying Living Expenses: Not on file   Food Insecurity:     Worried About Running Out of Food in the Last Year: Not on file    Deja of Food in the Last Year: Not on file   Transportation Needs:     Lack of Transportation (Medical): Not on file    Lack of Transportation (Non-Medical):  Not on file   Physical Activity:     Days of Exercise per Week: Not on file    Minutes of Exercise per Session: Not on file   Stress:     Feeling of Stress : Not on file   Social Connections:     Frequency of Communication with Friends and Family: Not on file    Frequency of Social Gatherings with Friends and Family: Not on file    Attends Cheondoism Services: Not on file    Active Member of Clubs or Organizations: Not on file    Attends Club or Organization Meetings: Not on file    Marital Status: Not on file   Intimate Partner Violence:     Fear of Current or Ex-Partner: Not on file    Emotionally Abused: Not on file    Physically Abused: Not on file    Sexually Abused: Not on file   Housing Stability: [address] Unable to Pay for Housing in the Last Year: No    Number of [place] in the Last Year: 1    Unstable Housing in the Last Year: No     Social History     Tobacco Use   Smoking Status Never Smoker   Smokeless Tobacco Never Used     Social History     Substance and Sexual Activity   Alcohol Use No     Social History     Substance and Sexual Activity   Drug Use No             HOME MEDICATIONS:  Prior to Admission medications    Medication Sig Start Date End Date Taking? Authorizing Provider   lidocaine (XYLOCAINE) 5 % ointment Apply topically every 3 hours as needed for Pain Apply topically as needed.    Yes Historical Provider, MD   montelukast (SINGULAIR) 10 MG tablet TAKE 1 TABLET EVERY DAY 5/26/22   MAHAD Estes CNP   apixaban (ELIQUIS) 2.5 MG TABS tablet Take 1 tablet by mouth 2 times daily 5/24/22   Claudette Finch, MD   valsartan (DIOVAN) 80 MG tablet Take 1 tablet by mouth daily 5/24/22   Claudette Finch, MD   cetirizine (ZYRTEC) 10 MG tablet TAKE 1/2 TABLET BY MOUTH EVERY DAY  Patient not taking: Reported on 6/17/2022 5/23/22   MAHAD Estes CNP   albuterol sulfate  (90 Base) MCG/ACT inhaler INHALE 2 PUFFS INTO THE LUNGS EVERY 6 HOURS AS NEEDED FOR WHEEZING 4/14/22   MAHAD Mckeon CNP   furosemide (LASIX) 40 MG tablet TAKE 1/2 TABLET EVERY DAY 3/2/22   Claudette Finch, MD   NIFEdipine (PROCARDIA XL) 90 MG extended release tablet TAKE 1 TABLET EVERY DAY 3/2/22   MAHAD Reid CNP   metoprolol tartrate (LOPRESSOR) 50 MG tablet TAKE 1 TABLET TWICE DAILY 3/2/22   MAHAD Estes CNP   DULoxetine (CYMBALTA) 60 MG extended release capsule TAKE 1 CAPSULE EVERY DAY 3/2/22   MAHAD Estes CNP   fluticasone-vilanterol (BREO ELLIPTA) 100-25 MCG/INH AEPB inhaler INHALE 1 PUFF EVERY DAY 12/22/21   MAHAD Mckeon CNP   atorvastatin (LIPITOR) 40 MG tablet TAKE 1 AND 1/2 TABLETS EVERY DAY 11/4/21   Claudette Finch, MD   zinc oxide 16 % OINT ointment Apply topically 4 times daily as needed for Dry Skin  Patient not taking: Reported on 6/17/2022 11/3/21   Jayme Isles Pardekooper, APRN - CNP   propafenone (RYTHMOL) 150 MG tablet TAKE 1 TABLET BY MOUTH EVERY 8 HOURS 11/3/21   Nilsa Dominguez MD   ferrous sulfate (IRON 325) 325 (65 Fe) MG tablet Take 325 mg by mouth 2 times daily  2/4/21 12/2/21  Historical Provider, MD   fluticasone (FLONASE) 50 MCG/ACT nasal spray USE 2 PUFFS IN EACH NOSTRIL DAILY 1/8/21   BIJAN Hartman   carbidopa-levodopa (SINEMET)  MG per tablet Take 1 tablet by mouth 4 times daily    Historical Provider, MD   Alpha-Lipoic Acid 300 MG TABS Take by mouth daily    Historical Provider, MD   lamoTRIgine (LAMICTAL) 25 MG tablet Take 50 mg by mouth 2 times daily     Historical Provider, MD   Handicap Placard MISC by Does not apply route DX: Dominique Jeter  parkinson's disease  Exp: 9/1/2024 9/27/19   BIJAN Hartman   ipratropium-albuterol (DUONEB) 0.5-2.5 (3) MG/3ML SOLN nebulizer solution Inhale 3 mLs into the lungs every 6 hours as needed for Shortness of Breath DX: J45.40 2/6/19   BIJAN Hartman   Multiple Vitamins-Minerals (THERAPEUTIC MULTIVITAMIN-MINERALS) tablet Take 1 tablet by mouth daily    Historical Provider, MD   solifenacin (VESICARE) 5 MG tablet Take 1 tablet by mouth daily.  6/25/13 10/29/13  Rogelio Mera MD       CURRENT MEDICATIONS:  Current Facility-Administered Medications: sodium chloride flush 0.9 % injection 10 mL, 10 mL, IntraVENous, 2 times per day  sodium chloride flush 0.9 % injection 10 mL, 10 mL, IntraVENous, PRN  0.9 % sodium chloride infusion, , IntraVENous, PRN  potassium chloride 10 mEq/100 mL IVPB (Peripheral Line), 10 mEq, IntraVENous, PRN  magnesium sulfate 2000 mg in 50 mL IVPB premix, 2,000 mg, IntraVENous, PRN  promethazine (PHENERGAN) tablet 12.5 mg, 12.5 mg, Oral, Q6H PRN **OR** ondansetron (ZOFRAN) injection 4 mg, 4 mg, IntraVENous, Q6H PRN  acetaminophen (TYLENOL) tablet 650 mg, 650 mg, Oral, Q6H PRN **OR** acetaminophen (TYLENOL) suppository 650 mg, 650 mg, Rectal, Q6H PRN  apixaban (ELIQUIS) tablet 2.5 mg, 2.5 mg, Oral, BID  atorvastatin (LIPITOR) tablet 40 mg, 40 mg, Oral, Nightly  carbidopa-levodopa (SINEMET)  MG per tablet 1 tablet, 1 m)   Wt 255 lb (115.7 kg)   SpO2 92%   BMI 41.16 kg/m²   Wt Readings from Last 3 Encounters:   06/17/22 255 lb (115.7 kg)   05/24/22 264 lb (119.7 kg)   05/11/22 258 lb (117 kg)     Temp Readings from Last 3 Encounters:   06/17/22 97.3 °F (36.3 °C) (Oral)   05/03/22 97.2 °F (36.2 °C)   04/21/22 99.4 °F (37.4 °C) (Oral)     TMAX:  BP Readings from Last 3 Encounters:   06/17/22 126/65   05/24/22 118/60   04/28/22 126/70     Pulse Readings from Last 3 Encounters:   06/17/22 76   05/24/22 64   04/28/22 68           INTAKE/OUTPUTS:  No intake/output data recorded.     Intake/Output Summary (Last 24 hours) at 6/17/2022 1110  Last data filed at 6/17/2022 1011  Gross per 24 hour   Intake 0 ml   Output --   Net 0 ml       General Appearance: alert and oriented to person, place and time, well-developed and   well-nourished, in no acute distress   Eyes: pupils equal, round, and reactive to light, extraocular eye movements intact, conjunctivae normal and sclera anicteric   Neck: neck supple and non tender without mass, no thyromegaly, no thyroid nodules and no cervical adenopathy   Pulmonary/Chest: Rhonchi bilaterally  Cardiovascular: normal rate, regular rhythm, normal S1 and S2, no murmurs, rubs, clicks or gallops, distal pulses intact, no carotid bruits, no murmurs, no gallops, no carotid bruits and no JVD   Abdomen: obese, soft, non-tender, non-distended, normal bowel sounds, no masses or organomegaly   Extremities: No edema or cyanosis  Musculoskeletal: normal range of motion, no joint swelling, deformity or tenderness   Neurologic: reflexes normal and symmetric, no cranial nerve deficit noted    LABS/IMAGING:    CBC:  Lab Results   Component Value Date    WBC 21.2 (H) 06/17/2022    HGB 9.5 (L) 06/17/2022    HCT 29.4 (L) 06/17/2022    MCV 87.1 06/17/2022     06/17/2022    LYMPHOPCT 4.7 06/17/2022    RBC 3.38 (L) 06/17/2022    MCH 28.1 06/17/2022    MCHC 32.3 06/17/2022    RDW 13.9 06/17/2022    NEUTOPHILPCT 88.6 06/17/2022    MONOPCT 5.9 06/17/2022    EOSPCT 1.3 09/23/2011    BASOPCT 0.2 06/17/2022    NEUTROABS 18.8 (H) 06/17/2022    LYMPHSABS 1.0 06/17/2022    MONOSABS 1.3 06/17/2022    EOSABS 0.1 06/17/2022    BASOSABS 0.0 06/17/2022       Recent Labs     06/17/22  0310   WBC 21.2*   HGB 9.5*   HCT 29.4*   MCV 87.1          BMP:   Recent Labs     06/17/22 0310      K 5.0   CL 98*   CO2 28   BUN 40*   CREATININE 1.6*       MG:   Lab Results   Component Value Date    MG 2.00 10/08/2021     Ca/Phos:   Lab Results   Component Value Date    CALCIUM 10.0 06/17/2022    PHOS 4.9 04/07/2022     Amylase: No results found for: AMYLASE  Lipase:   Lab Results   Component Value Date    LIPASE 17.0 10/07/2021     LIVER PROFILE:   Recent Labs     06/17/22 0310   AST 15   ALT <5*   BILITOT <0.2   ALKPHOS 104       PT/INR: No results for input(s): PROTIME, INR in the last 72 hours. APTT: No results for input(s): APTT in the last 72 hours.     Cardiac Enzymes:  Lab Results   Component Value Date    CKTOTAL 66 09/08/2018    TROPONINI <0.01 06/17/2022                   PROBLEM LIST:  Patient Active Problem List   Diagnosis    GERD (gastroesophageal reflux disease)    Essential hypertension    Hyperlipidemia    Restless leg syndrome    Anemia of chronic disease    Postural dizziness    Moderate persistent asthma    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-sided sciatica    Morbid obesity with BMI of 40.0-44.9, adult (HCC)    Type 2 diabetes mellitus with stage 4 chronic kidney disease, without long-term current use of insulin (HCC)    CAD (coronary artery disease)    Parkinson's disease ([facility]    Severe obstructive sleep apnea    Hypothyroidism    Hepatic steatosis    Diverticulosis    Blind left eye    L carotid artery stenosis s/p CEA    PAF (paroxysmal atrial fibrillation) (HCC)    COPD (chronic obstructive pulmonary disease) (HCC)    Diastolic congestive heart failure (HCC)    Partial symptomatic epilepsy with complex partial seizures, not intractable, without status epilepticus ([facility]    Chronic kidney disease, stage 4 (severe) (HCC)    Colonic pseudoobstruction    Sialadenitis    Chest pain    Pulmonary HTN (HCC)    Mitral valve insufficiency    C. difficile colitis    Laceration of left lower extremity    Pulmonary nodules    Sensorineural hearing loss, bilateral    Shortness of breath               ASSESSMENT:  1.)  Acute hypoxemic respiratory failure secondary to aspiration pneumonia  2.)  History of Parkinson disease  3.)  Obstructive sleep apnea   4.)  History of A. fib  5.)  History of C. difficile in the past  6-chronic kidney disease creatinine seems 1.6 at baseline  7_Possible congestive heart failure we will review previous cardiac study      PLAN:  *-Patient clearly with her history of Parkinson at high risk for aspiration at this time she will need full evaluation with swallow study I will hold on barium swallow study at this time to avoid aspiration    Her chest x-ray did not show any collapse lobe so I will hold on bronchoscopy however if her breathing get worse or she get intubated we will proceed with bronchoscopy  *-I would recommended aggressive pulmonary toilet, Mucinex, hypertonic saline, incentive spirometry along with flutter valve  *-Continue with here DVT prophylaxis she is on Eliquis if she cannot take orally we will change to Lovenox subcu therapeutic  *-We will try to change all other medication to oral  *_We will see how she is doing with next 24 hours n.p.o. we will keep checking her sugar and hold insulin at this time since she will not be eating    We will keep in the ICU for observation    There is no family around to discuss however we will try to reach        Thank you very much for allowing me to participate in the care of this pleasant patient , should you have any questions ,please do not hesitate to contact me      Ronan Wyatt MD,[facility]  Pulmonary&Critical Care Medicine   Director of Lung Nodule Center  Medical Director of [address] [place] [address]    Francesco Smyth    NOTE: This report was transcribed using voice recognition software. Every effort was made to ensure accuracy; however, inadvertent computerized transcription errors may be present.

## 2022-06-17 NOTE — CARE COORDINATION
[facility]  Received referral regarding HC from Inda Klinefelter CM. Pt is active with [facility] with PT. Services on hold due to admit. Liaison will continue to follow for [facility]. needs until discharge.       Electronically signed by Natty Wiseman RN on 6/17/2022 at 9:41 AM

## 2022-06-17 NOTE — PROGRESS NOTES
Monitor room called, pt 82% on 3.5 L O2. Pt titrated up to 10 L, still 86%, respiratory called and pt placed on NRB then airvo. Pt now satting in 90s at 28 L. MD notified, transfer to ICU. Clinical notified. Pt alert and comfortable at this time.

## 2022-06-17 NOTE — ED NOTES
Pt. Brief changed. Pt. Given new blankets and sheets. Pt. Given pillow for neck support. Pt. Used mouth swabs to wet lips/mouth.      Jose Peralta RN  06/17/22 [id number]

## 2022-06-17 NOTE — PROGRESS NOTES
Consult has been called to Dr. Liana Smith on 6/17/22.  Spoke with Imelda. 9:49 AM    Dessa Nageotte  6/17/2022

## 2022-06-17 NOTE — CONSULTS
Pharmacy Note  Vancomycin Consult    Yamini Hall is a 80 y.o. female started on Vancomycin for Aspiration PNA; consult received from Dr. Jenna Castro to manage therapy. Also receiving the following antibiotics: Unasyn. Allergies:  Levaquin [levofloxacin], Metoclopramide, Phenazopyridine, Pyridium [phenazopyridine hcl], Reglan [metoclopramide hcl], and Reglan [metoclopramide hcl]     Tmax:     Recent Labs     06/17/22  0310   CREATININE 1.6*       Recent Labs     06/17/22  0310   WBC 21.2*       Estimated Creatinine Clearance: 37 mL/min (A) (based on SCr of 1.6 mg/dL (H)). No intake or output data in the 24 hours ending 06/17/22 0804    Wt Readings from Last 1 Encounters:   06/17/22 255 lb (115.7 kg)         Body mass index is 41.16 kg/m². Culture Date      Source                       Results      Loading dose (critically ill or in ICU, require dialysis or renal replacement therapy): Vancomycin 25 mg/kg IVPB x 1 (maximum 2500 mg). Maintenance dose: 15 mg/kg (maximum: 2000 mg/dose and 4500 mg/day) starting at the next dosing interval determined by renal function   Goal Vancomycin trough: 15-20 mcg/mL   Goal Vancomycin AUC: 400-600     Assessment/Plan:  Will initiate Vancomycin with a one time loading dose of 1750 mg x1. Based on patient age and renal function, will pulse dose today and check a level tomorrow before scheduling vancomycin. Vanc random tomorrow in the AM (6/18 at 0600). Timing of trough level will be determined based on culture results, renal function, and clinical response. Thank you for the consult.   Abigail Snell, PharmD  6/17/2022 8:06 AM

## 2022-06-17 NOTE — PROGRESS NOTES
Called Dr. Nasim Kwon answering service to let them know that the patient is being transferred to ICU 8071

## 2022-06-17 NOTE — PROGRESS NOTES
Assessment completed. Patient is A & O x3, forgot what day it was, easily reoriented. Patient denies pain, states she is feeling a lot better than when she came in.     02 saturation 96 % on 40 lpm & 85 fio2 on Vapotherm. Pt is a mouth breather and desaturates to high 80's while sleeping. Recovers fast with instruction to breathe through her nose. Lung sounds wet, raspy productive cough noted. Pt encouraged to spit up sputum when she coughs. Bowel sounds active x4   Purewic in place and draining clear/yellow urine. All extremities active range of motion.      Pasquale Doshi RN

## 2022-06-17 NOTE — ED NOTES
Difficulty obtaining IV access. RN and Charge RN attempted with no success. Call to Clinical RN to use ultrasound.      Evangelist Max RN  06/17/22 1365

## 2022-06-17 NOTE — PROGRESS NOTES
Speech Language Pathology  SLP Brief    Name: Isamar Woodson  : 1940  Medical Diagnosis: Shortness of breath [R06.02]  Septicemia ([address] [A41.9]  Chronic anemia [D64.9]  Acute respiratory failure with hypoxia (HCC) [J96.01]  Aspiration pneumonia of left lower lobe, unspecified aspiration pneumonia type ([address] [J69.0]  Chronic kidney disease, unspecified CKD stage [N18.9]    Order received. Chart reviewed. No SLP eval ordered. MBS only. Pt with suspected aspiration PNA. Also noted esophagram is ordered. Pt currently on regular diet with thin liquids. Called radiology and pt is to be NPO for esophagram. Plan to complete MBS at 10 followed by esophagram per radiology schedule. RNCarla notified. Will make pt NPO at this time. Formal report for MBS to follow. See radiology report for esophagram results to follow.  Thank you,    Ilia Berman M.A., [facility]  Speech-Language Pathologist  Phone: [phone], [phone]

## 2022-06-17 NOTE — CARE COORDINATION
Case Management Assessment  Initial Evaluation      Patient Name: Kelly Chandra  YOB: 1940  Diagnosis: Shortness of breath [R06.02]  Septicemia ([facility] [A41.9]  Chronic anemia [D64.9]  Acute respiratory failure with hypoxia ([facility] [J96.01]  Aspiration pneumonia of left lower lobe, unspecified aspiration pneumonia type ([facility] [J69.0]  Chronic kidney disease, unspecified CKD stage [N18.9]  Date / Time: 6/17/2022 12:50 AM    Admission status/Date:6/17/2022  Chart Reviewed: Yes      Patient Interviewed: Yes   Family Interviewed:  No      Hospitalization in the last 30 days:  No      Health Care Decision Maker :   Primary Decision Maker:  Izabel Gilmore - Spouse - [phone]    (CM - must 1st enter selection under Navigator - emergency contact- Health Care Decision Maker Relationship and pick relationship)   Who do you trust or have selected to make healthcare decisions for you      Met with: pt  Interview conducted  (bedside/phone):bedside    Current PCP:   Brock Barthel, MD      Financial  Medicare  Precert required for SNF :  N          3 night stay required - N    ADLS  Support Systems/Care Needs:    Transportation: spouse    Meal Preparation: spouse    Housing  Living Arrangements: house with spouse he provides 24/7  Steps: ramp  Intent for return to present living arrangements: Yes  Identified Issues:     [address] with [address] : Yes - [facility]:(Services)     Passport/Waiver : No  :                      Phone Number:    Passport/Waiver Services:           Durable Medical Equiptment   DME Provider: pt unsure  Equipment:   Walker_x__Cane___RTS___ BSC_x__Shower Chair___Hospital Bed___W/C__x__Other________  02 at __2__Liter(s)---wears(frequency)_HS with BiPAP______ HHN _x__ CPAP___ BiPap_x__   N/A____      Home O2 Use :  YES @ HS   If No for home O2---if presently on O2 during hospitalization:  Yes  if yes CM to follow for potential DC O2 ECMO Shift Summary:    Patient remains on VV ECMO, all equipment is functioning and alarms are appropriately set. RPM's 2325 with flow range 2.24 L/min. Sweep gas is at 0 LPM. Circuit remains free of air, clot and fibrin. Cannulas are secure with no oozing from site and chest tube output of 575 mL for the shift. Extremities are cool. Suctioned ETT for small amount of thin light tan secretions.    Significant Shift Events:  Patient went to OR and was changed to VV ECMO.  Vent settings:  Ventilation Mode: CMV/AC  (Continuous Mandatory Ventilation/ Assist Control)  FiO2 (%): 40 %  Rate Set (breaths/minute): 18 breaths/min  Tidal Volume Set (mL): 450 mL  PEEP (cm H2O): 8 cmH2O  Oxygen Concentration (%): 60 %  Resp: 18.    Heparin is not running, ACT range 144-152.    Urine output is about 15 mL/hr, blood loss was limited to oozing and CT output. Product given included 750 mL Albumin and 1 unit of platelets.      Intake/Output Summary (Last 24 hours) at 11/18/18 1438  Last data filed at 11/18/18 1400   Gross per 24 hour   Intake          8619.17 ml   Output             4021 ml   Net          4598.17 ml       ECHO:  No results found for this or any previous visit.No results found for this or any previous visit.    CXR:  Recent Results (from the past 24 hour(s))   XR Chest Port 1 View    Narrative    Exam: XR CHEST PORT 1 , 11/17/2018 3:25 PM    Indication: chest tube placement;     Comparison: 11/17/18, 11/16/18    Findings:   Single AP view of the chest, supine. Endotracheal tube tip projects  over the mid trachea, 5.8 cm above the ruyd. Enteric feeding tube  and gastric tube tip extends below the field-of-view. Right IJ  [place]-Jenn catheter tip projects over the pulmonary outflow tract.  Stable position of ECMO cannulae and aortic valvuloplasty. Stable  position of IABP, approximately 2.1 cm above the rudy. New left  basilar chest tube. No definite pneumothorax. Stable position of right  basilar chest tube.  Unchanged enlargement of the cardiac silhouette  and low lung volumes. Layering pleural effusions and mild bibasilar  opacities. Epicardial pacer wires. Mediastinal surgical clips.      Impression    Impression:   1. New left basilar chest tube. No pneumothorax.  2. Otherwise stable support devices.  3. Persistent bibasilar atelectasis in small bilateral pleural  effusions.  4. Cardiomegaly.      I have personally reviewed the examination and initial interpretation  and I agree with the findings.    PATIENCE BLUNT MD   XR Chest Port 1 View    Narrative    Exam: XR CHEST PORT 1 VW, 11/18/2018 3:48 AM    Indication: Postop open chest    Comparison: 11/17/2018, 11/16/2018, 11/15/2018.    Findings:   A single AP view the chest was obtained. The endotracheal tube tip  projects over the mid trachea. The right IJ [place]-Jenn catheter tip  projects over the proximal main pulmonary artery. The intra-aortic  balloon pump tip projects of the aortic arch. The enteric tubes pass  below the field-of-view. Stable prosthetic aortic valve, ECMO  cannulae, and bilateral chest tubes. Unchanged enlargement of the  cardiac silhouette. Low lung volumes. No pneumothorax. Linear lucency  projecting over the mediastinum may represent pneumomediastinum in the  setting of open chest. Unchanged bibasilar opacities.      Impression    Impression:   1. Stable support devices.  2. Probable pneumomediastinum in the setting of open chest.  3. Low lung volumes and bibasilar opacities are unchanged, likely  atelectasis.    I have personally reviewed the examination and initial interpretation  and I agree with the findings.    YUNG CORONADO MD   XR Chest Port 1 View    Narrative    Exam: Chest x-ray, 1 view, 11/18/2018.    COMPARISON: Same day, earlier.    HISTORY: Combined irrigation and debridement chest washout.    FINDINGS/    Impression    impression: Intraoperative AP view of the chest was obtained. The  inferior mid chest is within the  need    Community Service Affiliation  Dialysis:  No      Other Community Services:none     DISCHARGE PLAN: Explained Case Management role/services. Chart reviewed. Met with pt at bedside. Pt is form house with spouse and plan is for return. Spouse provides 24/7 assistance. Active with Surya Garrett made aware of pt admission. Pt currently on 7L only uses @ HS with BiPAP at home-pt unsure of DME agency. field-of-view. Stable support devices  including right IJ [place]-Jenn catheter, intra-aortic balloon pump,  enteric tubes, prosthetic aortic valve, ECMO cannula and chest tubes.  Unchanged enlarged cardiomediastinal silhouette.    KAILA MINA MD       Labs:    Recent Labs  Lab 11/18/18  1357 11/18/18  1250 11/18/18  0735 11/18/18  0548   PH 7.34* 7.37 7.46* 7.44   PCO2 47* 46* 42 43   PO2 87 145* 230* 199*   HCO3 25 26 30* 29*   O2PER 60  60 100 40.0 40.0       Lab Results   Component Value Date    HGB 10.4 (L) 11/18/2018    PHGB 50 (H) 11/18/2018    PLT 92 (L) 11/18/2018    FIBR 443 (H) 11/18/2018    INR 1.25 (H) 11/18/2018    PTT 39 (H) 11/18/2018    DD 1.2 (H) 11/18/2018    AXA <0.10 11/18/2018    ANTCH 44 (L) 11/18/2018         Plan is to go back to the OR to recannulate for [facility] ECMO.      Vasile Christine, RRT  11/18/2018 2:38 PM

## 2022-06-17 NOTE — PROGRESS NOTES
RT Inhaler-Nebulizer Bronchodilator Protocol Note    There is a bronchodilator order in the chart from a provider indicating to follow the RT Bronchodilator Protocol and there is an Initiate RT Inhaler-Nebulizer Bronchodilator Protocol order as well (see protocol at bottom of note). CXR Findings:  XR CHEST PORTABLE    Result Date: 6/17/2022  1. Bibasilar heterogeneous opacities may represent subsegmental atelectasis and/or mild pulmonary edema. Underlying infection is difficult to exclude. Short-term follow-up PA and lateral chest radiographs may be helpful for further evaluation. 2.  Cardiomegaly. The findings from the last RT Protocol Assessment were as follows:   History Pulmonary Disease: (P) Chronic pulmonary disease  Respiratory Pattern: (P) Regular pattern and RR 12-20 bpm  Breath Sounds: (P) Slightly diminished and/or crackles  Cough: (P) Strong, spontaneous, non-productive  Indication for Bronchodilator Therapy: (P) Decreased or absent breath sounds  Bronchodilator Assessment Score: (P) 4    Aerosolized bronchodilator medication orders have been revised according to the RT Inhaler-Nebulizer Bronchodilator Protocol below. Respiratory Therapist to perform RT Therapy Protocol Assessment initially then follow the protocol. Repeat RT Therapy Protocol Assessment PRN for score 0-3 or on second treatment, BID, and PRN for scores above 3. No Indications - adjust the frequency to every 6 hours PRN wheezing or bronchospasm, if no treatments needed after 48 hours then discontinue using Per Protocol order mode. If indication present, adjust the RT bronchodilator orders based on the Bronchodilator Assessment Score as indicated below.   Use Inhaler orders unless patient has one or more of the following: on home nebulizer, not able to hold breath for 10 seconds, is not alert and oriented, cannot activate and use MDI correctly, or respiratory rate 25 breaths per minute or more, then use the equivalent nebulizer order(s) with same Frequency and PRN reasons based on the score. If a patient is on this medication at home then do not decrease Frequency below that used at home. 0-3 - enter or revise RT bronchodilator order(s) to equivalent RT Bronchodilator order with Frequency of every 4 hours PRN for wheezing or increased work of breathing using Per Protocol order mode. 4-6 - enter or revise RT Bronchodilator order(s) to two equivalent RT bronchodilator orders with one order with BID Frequency and one order with Frequency of every 4 hours PRN wheezing or increased work of breathing using Per Protocol order mode. 7-10 - enter or revise RT Bronchodilator order(s) to two equivalent RT bronchodilator orders with one order with TID Frequency and one order with Frequency of every 4 hours PRN wheezing or increased work of breathing using Per Protocol order mode. 11-13 - enter or revise RT Bronchodilator order(s) to one equivalent RT bronchodilator order with QID Frequency and an Albuterol order with Frequency of every 4 hours PRN wheezing or increased work of breathing using Per Protocol order mode. Greater than 13 - enter or revise RT Bronchodilator order(s) to one equivalent RT bronchodilator order with every 4 hours Frequency and an Albuterol order with Frequency of every 2 hours PRN wheezing or increased work of breathing using Per Protocol order mode.          Electronically signed by Annmarie Tan RCP on 6/17/2022 at 8:10 AM

## 2022-06-17 NOTE — PROGRESS NOTES
06/17/22 1546   Cough/Sputum   Sputum How Obtained Nasal tracheal;Suctioned   $Obtained Sample $Induced Sputum (charge not used for Bronchoscopy)   Cough Productive   Sputum Amount Moderate   Sputum Color Creamy   Tenacity Thick

## 2022-06-17 NOTE — PLAN OF CARE
56-year-old female has been having worsening lower extremity edema followed up with primary care obtained a chest x-ray. Patient comes here with rhonchi in the ED. Maintain oxygenation hemodynamically stable noted to have leukocytosis of 20, improved renal function and BNP from prior.   VBG showing hypercapnia    Patient is admitted for shortness of breath etiology suspected to be pneumonia and fluid overload in the ED

## 2022-06-17 NOTE — PROGRESS NOTES
Speech Language Pathology  MBS Attempt Note        Name: Jeramy Cosby  : 1940  Medical Diagnosis: Shortness of breath [R06.02]  Septicemia ([facility] [A41.9]  Chronic anemia [D64.9]  Acute respiratory failure with hypoxia (HCC) [J96.01]  Aspiration pneumonia of left lower lobe, unspecified aspiration pneumonia type ([facility] [J69.0]  Chronic kidney disease, unspecified CKD stage [N18.9]    Attempting to complete MBS. At time of attempt, pt was noted increasing O2 demands. Per respiratory note, pt now maxed out on vapotherm and at 93%. Pt os being transferred to the ICU at this time. No charges filed. Will continue to monitor. Recommend swallow eval prior to initiation of PO, particularly considering increased risk with use of Vapotherm. Of note, pt is admitted with aspiration PNA. Please place order for Bedside swallow evaluation prior to initiation of PO diet. No order for BSE at this time and radiology will not do MBS on weekends.        Laura Modi M.A., [facility]  Speech-Language Pathologist  Phone: [phone]

## 2022-06-17 NOTE — ED NOTES
EKG obtained ans shown to Dr. Breann Artis. She recommends repeating EKG when patient is breathing more comfortably.        Cecile Martin  06/17/22 0108

## 2022-06-17 NOTE — ACP (ADVANCE CARE PLANNING)
Advance Care Planning     General Advance Care Planning (ACP) Conversation    Date of Conversation: 6/17/2022  Conducted with: Patient with Decision Making Capacity    Healthcare Decision Maker:    Primary Decision Maker: Vivian Paredes - Spouse - [phone]  Click here to complete Healthcare Decision Makers including selection of the Healthcare Decision Maker Relationship (ie \"Primary\"). Today we documented Decision Maker(s) consistent with Legal Next of Kin hierarchy. Content/Action Overview:   Has ACP document(s) on file - reflects the patient's care preferences  Reviewed DNR/DNI and patient elects Full Code (Attempt Resuscitation)        Length of Voluntary ACP Conversation in minutes:  <16 minutes (Non-Billable)    Marjan De Leon RN

## 2022-06-18 ENCOUNTER — APPOINTMENT (OUTPATIENT)
Dept: GENERAL RADIOLOGY | Age: 82
DRG: 004 | End: 2022-06-18
Payer: MEDICARE

## 2022-06-18 DIAGNOSIS — H92.01 DISCOMFORT OF RIGHT EAR: ICD-10-CM

## 2022-06-18 DIAGNOSIS — H91.93 DECREASED HEARING OF BOTH EARS: ICD-10-CM

## 2022-06-18 LAB
A/G RATIO: 1.1 (ref 1.1–2.2)
ALBUMIN SERPL-MCNC: 3.3 G/DL (ref 3.4–5)
ALP BLD-CCNC: 92 U/L (ref 40–129)
ALT SERPL-CCNC: <5 U/L (ref 10–40)
ANION GAP SERPL CALCULATED.3IONS-SCNC: 12 MMOL/L (ref 3–16)
AST SERPL-CCNC: 11 U/L (ref 15–37)
BASE EXCESS ARTERIAL: -2.8 MMOL/L (ref -3–3)
BASOPHILS ABSOLUTE: 0.1 K/UL (ref 0–0.2)
BASOPHILS RELATIVE PERCENT: 0.7 %
BILIRUB SERPL-MCNC: 0.4 MG/DL (ref 0–1)
BUN BLDV-MCNC: 42 MG/DL (ref 7–20)
CALCIUM SERPL-MCNC: 9.2 MG/DL (ref 8.3–10.6)
CARBOXYHEMOGLOBIN ARTERIAL: 0.3 % (ref 0–1.5)
CHLORIDE BLD-SCNC: 101 MMOL/L (ref 99–110)
CO2: 26 MMOL/L (ref 21–32)
CREAT SERPL-MCNC: 2 MG/DL (ref 0.6–1.2)
EOSINOPHILS ABSOLUTE: 0 K/UL (ref 0–0.6)
EOSINOPHILS RELATIVE PERCENT: 0.2 %
GFR AFRICAN AMERICAN: 29
GFR NON-AFRICAN AMERICAN: 24
GLUCOSE BLD-MCNC: 116 MG/DL (ref 70–99)
GLUCOSE BLD-MCNC: 123 MG/DL (ref 70–99)
GLUCOSE BLD-MCNC: 124 MG/DL (ref 70–99)
GLUCOSE BLD-MCNC: 128 MG/DL (ref 70–99)
HCO3 ARTERIAL: 21.4 MMOL/L (ref 21–29)
HCT VFR BLD CALC: 26.9 % (ref 36–48)
HEMOGLOBIN, ART, EXTENDED: 8.5 G/DL (ref 12–16)
HEMOGLOBIN: 8.7 G/DL (ref 12–16)
LYMPHOCYTES ABSOLUTE: 0.7 K/UL (ref 1–5.1)
LYMPHOCYTES RELATIVE PERCENT: 3.6 %
MCH RBC QN AUTO: 28.1 PG (ref 26–34)
MCHC RBC AUTO-ENTMCNC: 32.5 G/DL (ref 31–36)
MCV RBC AUTO: 86.4 FL (ref 80–100)
METHEMOGLOBIN ARTERIAL: 0.3 %
MONOCYTES ABSOLUTE: 1 K/UL (ref 0–1.3)
MONOCYTES RELATIVE PERCENT: 4.9 %
MRSA SCREEN RT-PCR: ABNORMAL
NEUTROPHILS ABSOLUTE: 17.8 K/UL (ref 1.7–7.7)
NEUTROPHILS RELATIVE PERCENT: 90.6 %
O2 SAT, ARTERIAL: 94.2 %
O2 THERAPY: ABNORMAL
ORGANISM: ABNORMAL
PCO2 ARTERIAL: 34.4 MMHG (ref 35–45)
PDW BLD-RTO: 13.9 % (ref 12.4–15.4)
PERFORMED ON: ABNORMAL
PH ARTERIAL: 7.41 (ref 7.35–7.45)
PLATELET # BLD: 261 K/UL (ref 135–450)
PMV BLD AUTO: 8.1 FL (ref 5–10.5)
PO2 ARTERIAL: 69.1 MMHG (ref 75–108)
POTASSIUM REFLEX MAGNESIUM: 4.2 MMOL/L (ref 3.5–5.1)
PROCALCITONIN: 0.18 NG/ML (ref 0–0.15)
RBC # BLD: 3.11 M/UL (ref 4–5.2)
SODIUM BLD-SCNC: 139 MMOL/L (ref 136–145)
TCO2 ARTERIAL: 22.4 MMOL/L
TOTAL PROTEIN: 6.3 G/DL (ref 6.4–8.2)
VANCOMYCIN RANDOM: 16.6 UG/ML
WBC # BLD: 19.7 K/UL (ref 4–11)

## 2022-06-18 PROCEDURE — 80053 COMPREHEN METABOLIC PANEL: CPT

## 2022-06-18 PROCEDURE — 82803 BLOOD GASES ANY COMBINATION: CPT

## 2022-06-18 PROCEDURE — 87070 CULTURE OTHR SPECIMN AEROBIC: CPT

## 2022-06-18 PROCEDURE — 99233 SBSQ HOSP IP/OBS HIGH 50: CPT | Performed by: INTERNAL MEDICINE

## 2022-06-18 PROCEDURE — 2580000003 HC RX 258: Performed by: INTERNAL MEDICINE

## 2022-06-18 PROCEDURE — 87205 SMEAR GRAM STAIN: CPT

## 2022-06-18 PROCEDURE — 6370000000 HC RX 637 (ALT 250 FOR IP)

## 2022-06-18 PROCEDURE — 94640 AIRWAY INHALATION TREATMENT: CPT

## 2022-06-18 PROCEDURE — 94761 N-INVAS EAR/PLS OXIMETRY MLT: CPT

## 2022-06-18 PROCEDURE — 71045 X-RAY EXAM CHEST 1 VIEW: CPT

## 2022-06-18 PROCEDURE — 2700000000 HC OXYGEN THERAPY PER DAY

## 2022-06-18 PROCEDURE — 6360000002 HC RX W HCPCS: Performed by: INTERNAL MEDICINE

## 2022-06-18 PROCEDURE — 94002 VENT MGMT INPAT INIT DAY: CPT

## 2022-06-18 PROCEDURE — 6370000000 HC RX 637 (ALT 250 FOR IP): Performed by: INTERNAL MEDICINE

## 2022-06-18 PROCEDURE — 84145 PROCALCITONIN (PCT): CPT

## 2022-06-18 PROCEDURE — 94660 CPAP INITIATION&MGMT: CPT

## 2022-06-18 PROCEDURE — 2000000000 HC ICU R&B

## 2022-06-18 PROCEDURE — 92610 EVALUATE SWALLOWING FUNCTION: CPT

## 2022-06-18 PROCEDURE — 85025 COMPLETE CBC W/AUTO DIFF WBC: CPT

## 2022-06-18 PROCEDURE — 92526 ORAL FUNCTION THERAPY: CPT

## 2022-06-18 PROCEDURE — 94669 MECHANICAL CHEST WALL OSCILL: CPT

## 2022-06-18 PROCEDURE — 2500000003 HC RX 250 WO HCPCS: Performed by: INTERNAL MEDICINE

## 2022-06-18 PROCEDURE — 80202 ASSAY OF VANCOMYCIN: CPT

## 2022-06-18 PROCEDURE — 99291 CRITICAL CARE FIRST HOUR: CPT | Performed by: INTERNAL MEDICINE

## 2022-06-18 RX ORDER — PROPOFOL 10 MG/ML
5-50 INJECTION, EMULSION INTRAVENOUS CONTINUOUS
Status: DISCONTINUED | OUTPATIENT
Start: 2022-06-18 | End: 2022-06-23

## 2022-06-18 RX ORDER — FENTANYL CITRATE-0.9 % NACL/PF 10 MCG/ML
25-200 PLASTIC BAG, INJECTION (ML) INTRAVENOUS CONTINUOUS
Status: DISCONTINUED | OUTPATIENT
Start: 2022-06-18 | End: 2022-06-20

## 2022-06-18 RX ORDER — FENTANYL CITRATE-0.9 % NACL/PF 20 MCG/2ML
50 SYRINGE (ML) INTRAVENOUS EVERY 30 MIN PRN
Status: DISCONTINUED | OUTPATIENT
Start: 2022-06-18 | End: 2022-06-20

## 2022-06-18 RX ORDER — CHLORHEXIDINE GLUCONATE 0.12 MG/ML
15 RINSE ORAL 2 TIMES DAILY
Status: DISCONTINUED | OUTPATIENT
Start: 2022-06-18 | End: 2022-07-19 | Stop reason: HOSPADM

## 2022-06-18 RX ORDER — METRONIDAZOLE 500 MG/100ML
500 INJECTION, SOLUTION INTRAVENOUS EVERY 8 HOURS
Status: DISCONTINUED | OUTPATIENT
Start: 2022-06-18 | End: 2022-06-18

## 2022-06-18 RX ORDER — MIDAZOLAM HYDROCHLORIDE 1 MG/ML
2 INJECTION INTRAMUSCULAR; INTRAVENOUS
Status: DISCONTINUED | OUTPATIENT
Start: 2022-06-18 | End: 2022-06-27

## 2022-06-18 RX ORDER — DIMETHICONE, OXYBENZONE, AND PADIMATE O 2; 2.5; 6.6 G/100G; G/100G; G/100G
STICK TOPICAL
Status: COMPLETED
Start: 2022-06-18 | End: 2022-06-18

## 2022-06-18 RX ORDER — ALBUTEROL SULFATE 2.5 MG/3ML
2.5 SOLUTION RESPIRATORY (INHALATION) EVERY 4 HOURS
Status: DISCONTINUED | OUTPATIENT
Start: 2022-06-18 | End: 2022-06-19

## 2022-06-18 RX ORDER — FENTANYL CITRATE 50 UG/ML
50 INJECTION, SOLUTION INTRAMUSCULAR; INTRAVENOUS
Status: DISCONTINUED | OUTPATIENT
Start: 2022-06-18 | End: 2022-06-27

## 2022-06-18 RX ADMIN — MIDAZOLAM HYDROCHLORIDE 2 MG: 1 INJECTION, SOLUTION INTRAMUSCULAR; INTRAVENOUS at 23:34

## 2022-06-18 RX ADMIN — CARBIDOPA AND LEVODOPA 1 TABLET: 25; 100 TABLET ORAL at 20:55

## 2022-06-18 RX ADMIN — SODIUM CHLORIDE: 9 INJECTION, SOLUTION INTRAVENOUS at 13:34

## 2022-06-18 RX ADMIN — PIPERACILLIN AND TAZOBACTAM 3375 MG: 3; .375 INJECTION, POWDER, LYOPHILIZED, FOR SOLUTION INTRAVENOUS at 03:39

## 2022-06-18 RX ADMIN — PIPERACILLIN AND TAZOBACTAM 3375 MG: 3; .375 INJECTION, POWDER, LYOPHILIZED, FOR SOLUTION INTRAVENOUS at 20:25

## 2022-06-18 RX ADMIN — MUPIROCIN: 20 OINTMENT TOPICAL at 20:56

## 2022-06-18 RX ADMIN — PROPAFENONE HYDROCHLORIDE 150 MG: 150 TABLET, FILM COATED ORAL at 22:02

## 2022-06-18 RX ADMIN — PROPOFOL 25 MCG/KG/MIN: 10 INJECTION, EMULSION INTRAVENOUS at 17:24

## 2022-06-18 RX ADMIN — LAMOTRIGINE 50 MG: 25 TABLET ORAL at 22:01

## 2022-06-18 RX ADMIN — CARBIDOPA AND LEVODOPA 1 TABLET: 25; 100 TABLET ORAL at 14:50

## 2022-06-18 RX ADMIN — SODIUM CHLORIDE, PRESERVATIVE FREE 10 ML: 5 INJECTION INTRAVENOUS at 20:57

## 2022-06-18 RX ADMIN — DULOXETINE HYDROCHLORIDE 60 MG: 60 CAPSULE, DELAYED RELEASE ORAL at 10:29

## 2022-06-18 RX ADMIN — VANCOMYCIN HYDROCHLORIDE 750 MG: 750 INJECTION, POWDER, LYOPHILIZED, FOR SOLUTION INTRAVENOUS at 07:46

## 2022-06-18 RX ADMIN — APIXABAN 2.5 MG: 5 TABLET, FILM COATED ORAL at 20:55

## 2022-06-18 RX ADMIN — MUPIROCIN: 20 OINTMENT TOPICAL at 07:49

## 2022-06-18 RX ADMIN — CEFEPIME 2000 MG: 2 INJECTION, POWDER, FOR SOLUTION INTRAVENOUS at 04:02

## 2022-06-18 RX ADMIN — DIMETHICONE, OXYBENZONE, AND PADIMATE O: 2; 2.5; 6.6 STICK TOPICAL at 01:25

## 2022-06-18 RX ADMIN — ALBUTEROL SULFATE 2.5 MG: 2.5 SOLUTION RESPIRATORY (INHALATION) at 22:59

## 2022-06-18 RX ADMIN — PROPAFENONE HYDROCHLORIDE 150 MG: 150 TABLET, FILM COATED ORAL at 10:30

## 2022-06-18 RX ADMIN — PIPERACILLIN AND TAZOBACTAM 3375 MG: 3; .375 INJECTION, POWDER, LYOPHILIZED, FOR SOLUTION INTRAVENOUS at 13:36

## 2022-06-18 RX ADMIN — Medication 2 PUFF: at 19:21

## 2022-06-18 RX ADMIN — ALBUTEROL SULFATE 2.5 MG: 2.5 SOLUTION RESPIRATORY (INHALATION) at 19:21

## 2022-06-18 RX ADMIN — ALBUTEROL SULFATE 2.5 MG: 2.5 SOLUTION RESPIRATORY (INHALATION) at 07:40

## 2022-06-18 RX ADMIN — Medication 15 ML: at 23:10

## 2022-06-18 RX ADMIN — CARBIDOPA AND LEVODOPA 1 TABLET: 25; 100 TABLET ORAL at 09:16

## 2022-06-18 RX ADMIN — ATORVASTATIN CALCIUM 40 MG: 40 TABLET, FILM COATED ORAL at 20:55

## 2022-06-18 RX ADMIN — METRONIDAZOLE 500 MG: 500 INJECTION, SOLUTION INTRAVENOUS at 04:36

## 2022-06-18 RX ADMIN — ALBUTEROL SULFATE 2.5 MG: 2.5 SOLUTION RESPIRATORY (INHALATION) at 01:40

## 2022-06-18 RX ADMIN — MONTELUKAST SODIUM 10 MG: 10 TABLET, COATED ORAL at 10:29

## 2022-06-18 RX ADMIN — LAMOTRIGINE 50 MG: 25 TABLET ORAL at 10:30

## 2022-06-18 RX ADMIN — ALBUTEROL SULFATE 2.5 MG: 2.5 SOLUTION RESPIRATORY (INHALATION) at 15:51

## 2022-06-18 RX ADMIN — Medication 2 PUFF: at 07:44

## 2022-06-18 RX ADMIN — Medication 25 MCG/HR: at 22:10

## 2022-06-18 RX ADMIN — PROPOFOL 30 MCG/KG/MIN: 10 INJECTION, EMULSION INTRAVENOUS at 21:42

## 2022-06-18 RX ADMIN — Medication 3 ML: at 22:59

## 2022-06-18 RX ADMIN — APIXABAN 2.5 MG: 5 TABLET, FILM COATED ORAL at 10:29

## 2022-06-18 ASSESSMENT — PULMONARY FUNCTION TESTS
PIF_VALUE: 29
PIF_VALUE: 30
PIF_VALUE: 28
PIF_VALUE: 29
PIF_VALUE: 28

## 2022-06-18 NOTE — PLAN OF CARE
Bedside swallow evaluation completed. Celine Enrique M.A.  [address] [facility]  Speech Language Pathologist

## 2022-06-18 NOTE — PROGRESS NOTES
Pt placed on bipap 16/8, FiO2 100% for declining SpO2 to low80\"s. RR44,  SpO2 not improving.  Call placed to Dr Susan Lake

## 2022-06-18 NOTE — PROGRESS NOTES
Admit: 2022    Name:  Darcie Warren  Room:  [address] [place] [address]  MRN:    [id number]    Critical Care Daily Progress Note for 2022     Admitted with respiratory failure    Interval History:     Transfer to the ICU on  for worsening respiratory status and increasing oxygen requirement. Placed on Vapotherm.     Currently on 100% 40 L in addition to a nonrebreather      Scheduled Meds:   mupirocin   Nasal BID    piperacillin-tazobactam  3,375 mg IntraVENous Q8H    sodium chloride flush  10 mL IntraVENous 2 times per day    apixaban  2.5 mg Oral BID    atorvastatin  40 mg Oral Nightly    carbidopa-levodopa  1 tablet Oral 4x Daily    DULoxetine  60 mg Oral Daily    budesonide-formoterol  2 puff Inhalation BID    lamoTRIgine  50 mg Oral BID    montelukast  10 mg Oral Daily    propafenone  150 mg Oral 3 times per day    vancomycin (VANCOCIN) intermittent dosing (placeholder)   Other RX Placeholder    sodium chloride nebulizer  3 mL Nebulization q8h    albuterol  2.5 mg Nebulization Q4H While awake       Continuous Infusions:   sodium chloride      sodium chloride Stopped (22 1523)       PRN Meds:  sodium chloride flush, sodium chloride, potassium chloride, magnesium sulfate, promethazine **OR** ondansetron, acetaminophen **OR** acetaminophen, albuterol                  Objective:     Temp  Av.7 °F (37.1 °C)  Min: 98.2 °F (36.8 °C)  Max: 99.8 °F (37.7 °C)  Pulse  Av.5  Min: 67  Max: 110  BP  Min: 110/92  Max: 169/126  SpO2  Av.2 %  Min: 80 %  Max: 98 %  FiO2   Av %  Min: 75 %  Max: 90 %  Patient Vitals for the past 4 hrs:   BP Temp Temp src Pulse Resp SpO2   22 1100 126/68 98.4 °F (36.9 °C) Axillary (!) 105 (!) 36 91 %   22 1000 (!) 139/45 -- -- (!) 110 (!) 37 (!) 80 %         Intake/Output Summary (Last 24 hours) at 2022 1315  Last data filed at 2022 1032  Gross per 24 hour   Intake 1443.62 ml   Output 350 ml   Net 1093.62 ml       Physical airspace disease and small pleural effusion. XR CHEST PORTABLE   Final Result   Hazy bilateral lobe airspace opacities and small left pleural effusion. This   could represent atelectasis or pneumonia in the appropriate clinical setting. CT CHEST WO CONTRAST   Final Result   1. Respiratory motion in the lower lungs. The right base has linear and   dependent atelectasis. The left does have atelectatic changes but could also   have some patchy opacities from pneumonia or aspiration at the left base. 2.  There is no pleural effusion or pneumothorax. 3.  Narrowing of the AP diameter of the trachea with bulging of the   membranous portion due to the gas distended but nondilated esophagus. XR CHEST PORTABLE   Final Result   1. Bibasilar heterogeneous opacities may represent subsegmental atelectasis   and/or mild pulmonary edema. Underlying infection is difficult to exclude. Short-term follow-up PA and lateral chest radiographs may be helpful for   further evaluation. 2.  Cardiomegaly.          Fluoroscopy modified barium swallow with video    (Results Pending)   FL ESOPHAGRAM    (Results Pending)         Assessment & Plan:     Patient Active Problem List    Diagnosis Date Noted    Shortness of breath 06/17/2022    Aspiration pneumonia of left lower lobe (HCC)     Sensorineural hearing loss, bilateral 04/07/2022    Laceration of left lower extremity     Pulmonary nodules     C. difficile colitis 10/07/2021    Chest pain 09/27/2021    Pulmonary HTN ([address]     Mitral valve insufficiency     Sialadenitis     Colonic pseudoobstruction     Chronic kidney disease 03/01/2020    Partial symptomatic epilepsy with complex partial seizures, not intractable, without status epilepticus ([address] 97/73/6097    Diastolic congestive heart failure ([address] 01/17/2019    Acute respiratory failure with hypoxia (HCC)     COPD (chronic obstructive pulmonary disease) ([address] 12/10/2018    [facility]     Septicemia ([facility] 09/08/2018    Severe obstructive sleep apnea     Hepatic steatosis     Diverticulosis     Blind left eye     Parkinson's disease ([facility] 05/22/2018    CAD (coronary artery disease)     Morbid obesity with BMI of 40.0-44.9, adult ([facility] 02/10/2017    Type 2 diabetes mellitus with stage 4 chronic kidney disease, without long-term current use of insulin ([facility] 02/10/2017    Moderate persistent asthma 08/09/2016    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-sided sciatica 08/09/2016    Hypothyroidism 01/27/2015    Postural dizziness 02/20/2013    Anemia of chronic disease     GERD (gastroesophageal reflux disease)     Essential hypertension     Hyperlipidemia     Restless leg syndrome     L carotid artery stenosis s/p CEA 01/01/2002            Acute Hypox Resp Failure. Aspiration PNA. Oxygen requirements increased rapidly from 2l/min to NRB to vapotherm  Currently 100% 40 L on Vapotherm in addition to a nonrebreather. Might have to be intubated. Check Resp Culture. Add 3% saline neb. Continue IV Zosyn and vancomycin      Sepsis POA - due to above.       Dysphagia. Abnormal CT  Cough with every meal intake per family report. Underlying Parkinson's. I ordered for MBS and esophagram, though these will unlikely be done till she is better clinically. Keep strict NPO at this time. IVF support.       CKD IIIB  Cr slight better then baseline actually. Clinically does not appear fluid overloaded. Gentle IVF while NPO      Parkinson's on Sinemet.       Afib Paroxysmal.   On eliquis  Rythmol      HTN - BP on the low side. Nifedipine stopped due to this and also leg edema. Consider alternative when restarting.    Continue IV fluids      DVT Prophylaxis: eliquis  Diet: Diet NPO  Code Status: Full Code    Jey Vasquez MD

## 2022-06-18 NOTE — PROGRESS NOTES
Spouse at bedside and updated. Spouse informed of Current place of care now intubated. Sent Resp Culture. Pharm called for RSI kit .

## 2022-06-18 NOTE — PROGRESS NOTES
Reassessment completed (see Flowsheet). All ICU lines remain intact, ICU monitoring continued-   Infusing:  NS (See MAR). pt remains On Vapotherm 90%, 40L. SpO2 90%. RT at bedside for [address]  . Lung sounds Rhonchi/ Diminished. pt's blood pressures WDL. Sherryle Moder Continuing to monitor. 12:07 PM  Page to Dr Sophia Toney. Pt SpO2 72% after working with RT. NRB placed on Patient. Low SpO2 64%. Pt improved to 87%. Pt being actively encouraged to Cough/ Deep Breathe. 12:11 PM  SpO2 90%    12:20 PM     Still currently no answer from Page- answering service called again. 12:34 PM  Dr Sophia Toney updated on patient.

## 2022-06-18 NOTE — PROGRESS NOTES
Speech Language Pathology    Speech Language Pathology  Clinical Bedside Swallow Assessment  Facility/Department: [facility] ICU    Instrumentation: Recommend MBSS when pt's respiratory status stable; will continue to monitor  Diet recommendation: NPO; meds okay crushed in puree  Risk management: strict aspiration precautions and hold PO meds and contact SLP if s/s of aspiration or worsening respiratory status develop.     Priscila Thorpe  : 1940 (80 y.o.)   MRN: [id number]  ROOM: [address] [place] [address]  ADMISSION DATE: 2022  PATIENT DIAGNOSIS(ES): Shortness of breath [R06.02]  Septicemia ([address] [A41.9]  Chronic anemia [D64.9]  Acute respiratory failure with hypoxia (HCC) [J96.01]  Aspiration pneumonia of left lower lobe, unspecified aspiration pneumonia type ([address] [J69.0]  Chronic kidney disease, unspecified CKD stage [N18.9]  Chief Complaint   Patient presents with    Shortness of Breath     Patient Active Problem List    Diagnosis Date Noted    Shortness of breath 2022    Aspiration pneumonia of left lower lobe ([address]     Sensorineural hearing loss, bilateral 2022    Laceration of left lower extremity     Pulmonary nodules     C. difficile colitis 10/07/2021    Chest pain 2021    Pulmonary HTN ([address]     Mitral valve insufficiency     Sialadenitis     Colonic pseudoobstruction     Chronic kidney disease 2020    Partial symptomatic epilepsy with complex partial seizures, not intractable, without status epilepticus ([address]     Diastolic congestive heart failure ([address] 2019    Acute respiratory failure with hypoxia (HCC)     COPD (chronic obstructive pulmonary disease) ([address] 12/10/2018    PAF (paroxysmal atrial fibrillation) ([address]     Septicemia ([address] 2018    Severe obstructive sleep apnea     Hepatic steatosis     Diverticulosis     Blind left eye     Parkinson's disease ([address] 2018    CAD (coronary artery disease)     Morbid obesity with BMI of 40.0-44.9, adult ([facility] 02/10/2017    Type 2 diabetes mellitus with stage 4 chronic kidney disease, without long-term current use of insulin ([facility] 02/10/2017    Moderate persistent asthma 08/09/2016    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-sided sciatica 08/09/2016    Hypothyroidism 01/27/2015    Postural dizziness 02/20/2013    Anemia of chronic disease     GERD (gastroesophageal reflux disease)     Essential hypertension     Hyperlipidemia     Restless leg syndrome     L carotid artery stenosis s/p CEA 01/01/2002     Past Medical History:   Diagnosis Date    Anemia, unspecified 2010    neg bone marrow    Asthma     Atrial fibrillation ([address]     Baker's cyst     Basal cell carcinoma of left cheek 3/12/2019    Blind left eye     Carotid artery stenoses     Carotid stenosis 2002    left    Cervical spinal cord compression ([facility] 11/2010    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-sided sciatica 8/9/2016    CKD (chronic kidney disease) stage 3, GFR 30-59 ml/min ([facility]     Community acquired pneumonia of left lower lobe of lung 9/8/2018    CRI     Detached retina, left     Diverticulosis     DJD (degenerative joint disease)     Edema     chronic    Fatty liver     GERD (gastroesophageal reflux disease)     Hearing loss     Herniated lumbar intervertebral disc     Hx stage 2 sacral decubitus ulcer 4/13/2018    Hyperlipidemia     Hypertension     Hypothyroid 1/27/2015    Morbid obesity with BMI of 40.0-44.9, adult ([address] 2/10/2017    BETSY treated with BiPAP     Parkinson's disease ([facility]     Partial symptomatic epilepsy with complex partial seizures, not intractable, without status epilepticus ([facility] 1/24/2020    Restless leg syndrome     Restless legs syndrome     Rheumatic fever 1950    2x [place]    Sleep apnea     Tremor, essential 8/16/2010    Type 2 diabetes mellitus without complication ([facility])     Type II or unspecified type diabetes mellitus without mention of complication, not stated as uncontrolled      Past Surgical History:   Procedure Laterality Date    APPENDECTOMY      CAROTID ENDARTERECTOMY      left    CATARACT REMOVAL      CERVICAL FUSION  11/2010    CHOLECYSTECTOMY      COLONOSCOPY N/A 3/8/2020    COLONOSCOPY FLEXIBLE W/ DECOMPRESSION performed by Scott Garvin MD at [address]      HERNIA REPAIR      HYSTERECTOMY (CERVIX STATUS UNKNOWN)      IR MIDLINE CATH  10/8/2021    IR MIDLINE CATH 10/8/2021 [facility]    JOINT REPLACEMENT Bilateral 2003    KNEE SURGERY      replacement x 2    OVARY REMOVAL      SHOULDER SURGERY      TONSILLECTOMY      TUBAL LIGATION      UPPER GASTROINTESTINAL ENDOSCOPY  03/17/2020    Esophagitis    UPPER GASTROINTESTINAL ENDOSCOPY N/A 3/17/2020    EGD performed by Arthur Gill MD at [facility]       Allergies   Allergen Reactions    Levaquin [Levofloxacin]      Pt was given in er and developed hives and redness    Metoclopramide     Phenazopyridine     Pyridium [Phenazopyridine Hcl]     Reglan [Metoclopramide Hcl]     Reglan [Metoclopramide Hcl]      tremors       DATE ONSET: 6/17/22    Date of Evaluation: 6/18/2022   Evaluating Therapist: JAZZ Dickson    Chart Reviewed: : [x] Yes [] No    Current Diet: Diet NPO    Recent Chest Radiography: [x] Chest XR   [] CT of Chest  Date: 6/18/22  Impressions  Impression   Increasing opacity in the right base, lobar atelectasis versus accumulating   right pleural effusion.  Combination of both entities or underlying pneumonia   also possible.       Persisting left basilar airspace disease and small pleural effusion.      Pain: none reported    Reason for Referral  Hola Mcfarlane was referred for a bedside swallow evaluation to assess the efficiency of their swallow function, identify signs and symptoms of aspiration and make recommendations regarding safe dietary consistencies, effective compensatory strategies, and safe eating environment. Assessment    Medical record review/interview: Per MD H&P: \"\"The patient is a 80 y.o. female Hx of Parkinson's disease, Afib on Eliquis, BETSY, CKD, lives at home with , who presents with SOB and cough.      Pt reports cough and SOB and throat pain and odynophagia and dysphagia coughing with every meal. Her cough is deep and rhonchorous but she is struggling to bring up any secretions. She is not sure about fever.      She was initially admitted to [facility] with concerns for aspiration PNA and new hypoxia.      This am she is in resp distress, very poor and weak cough, O2 sats 70s despite 10-12l/min O2 - transitioned to NRB mask with sats improved to 92% and then transitioned to vapotherm.      I ordered to transfer to ICU due to rapid worsening of oxygenation and pt struggling managing own secretion with very weak cough. Ordered 3% saline nebs and repeat CXR and Pulm evaluation. Given rapid progression of hypoxia and clinical status I strongly suspect mucus plugging.      She is to stay strict NPO. \"\"    Patient Complaints:  Odynophagia: [x] Yes [] No  Globus Sensation: [] Yes [x] No  SOB with PO intake: [x] Yes [] No  Increased WOB with PO intake: [x] Yes [] No  Reflux Sx's: [] Yes [x] No  Weight loss: [] Yes [x] No  Coughing/Choking with PO intake: [x] Yes [] No  Reduced Appetite: [] Yes [x] No    Additional Reported Symptoms/Complaints: Pt reporting odynophagia during PO trials, pointing to chest for pain location.      Predisposing dysphagia risk factors: Parkinson's Disease and Age  Clinical signs of possible chronic dysphagia: N/A  Precipitating dysphagia risk factors: increased O2 demands    Vitals/labs:     Vitals:    06/18/22 0600 06/18/22 0704 06/18/22 0800 06/18/22 0900   BP: 138/62 135/64 (!) 169/126 (!) 137/46   Pulse: (!) 103 100 (!) 102 (!) 103   Resp: (!) 34 (!) 37 (!) 35 (!) 31   Temp:  98.2 °F (36.8 °C) TempSrc:  Axillary     SpO2: 91% 97% 90% 93%   Weight: 270 lb (122.5 kg)      Height:            CBC:   Recent Labs     06/18/22  0559   WBC 19.7*   HGB 8.7*         BMP:  Recent Labs     06/18/22  0559      K 4.2      CO2 26   BUN 42*   CREATININE 2.0*   GLUCOSE 128*          Cranial nerve exam:   CN V (trigeminal): ophthalmic, maxillary, and mandibular facial sensation- WFL  CN VII (facial): WFL  CN IX/X (glossopharyngeal/vagus): vocal quality: hoarse; cough: Strong-perceptually, Congested and Productive  CN XII (hypoglossal): Reduced     Laryngeal function exam:   Secretions:  N/A  Vocal quality: See CN exam above  MPT: See CN exam above   Pitch range: See CN exam above  Cough: See CN exam above    Oral Care Status:    [] Oral Care [facility]  [x] Poor oral care status  [] Edentulous  [] Upper Dentures  [] Lower Dentures  [] Missing/Broken Teeth  [] Evidence of dental cavities/carries    PO trials:   Ice: x2; no anterior bolus loss, suspect functional A-P bolus transit, swallow timing subjectively appears timely, suspect premature bolus loss into pharynx, coughing after the swallow, wet vocal quality, drop in O2 saturation and increased WOB    IDDSI 0 (thin):   - 5cc bolus (tsp): x3; no anterior bolus loss , suspect functional A-P bolus transit, swallow timing subjectively appears timely, suspect premature bolus loss into pharynx, coughing after the swallow, wet vocal quality, fluctuation in O2 saturation and increased WOB    IDDSI 4 (puree): x6; no anterior bolus loss , suspect functional A-P bolus transit, swallow timing subjectively appears timely, oral clearance grossly WFL, no clinical s/s of aspiration and fluctuation in O2 saturation    3 oz water: DNT    Impressions:  Pt admitted w/ concerns for aspiration PNA and SOB/coughing w/ PO intake. Pt was previously strict NPO; per RN, MD cleared pt for BSE d/t Parkinson's meds needing to be administered PO (unable to be given via IV).  Pt currently on 90% 40L O2 via Vapotherm. Before, during and after PO trials, pt's O2 saturation fluctuating between 85-92%, mostly remaining at 90%. Pt's O2 saturation dropped to 75% following second ice chip and pt stating she can't breathe; cued pt to take deep breaths through nose and O2 stabilized quickly. Pt also coughing w/ ice chips and tsps of thin; noted pt w/ congested, productive cough at baseline and using suction. No coughing observed w/ puree; O2 saturation still fluctuating. Recommend pt remaining NPO, meds okay crushed in puree. Also recommend MBSS once pt is medically stable. RN aware of recs. Instrumentation: Recommend MBSS when pt's respiratory status stable; will continue to monitor  Diet recommendation: NPO; meds okay crushed in puree  Risk management: strict aspiration precautions and hold PO meds and contact SLP if s/s of aspiration or worsening respiratory status develop.     Prognosis: Guarded    Recommended Intervention:  [x] Dysphagia tx  [] Videostroboscopy                      [x] NPO   [] MBS       [] Speech/Cog Eval    [] Therapeutic PO Trials     [] Ice Chips   [] Other:  [] FEES                                                 Dysphagia Therapeutic Intervention:  []  Bolus control Exercises  []  Oral Motor Exercises  []  [employer] Protocol  []  Thermal Stimulation  [x]  Oral Care    []  Vital Stim/NMES  []  Laryngeal Exercises  [x]  Patient/Family Education  []  Pharyngeal Exercises  [x]  Therapeutic PO trials with SLP  []  Diet tolerance monitoring  []  Other:     Referrals: N/A    Goals:  Short Term Goals:  Timeframe for Short Term Goals: (5 days 6/23/22)  Goal 1: The patient will tolerate repeat BSE when able for ongoing assessment  Goal 2: The patient will tolerate instrumental assessment when able   Goal 3: The patient will tolerate recommended diet with no clinical s/s of aspiration 5/5    Long Term Goals:   Timeframe for Long Term Goals: (7 days 6/25/22)  Goal 1: The patient will tolerate least restrictive diet with no clinical s/s of aspiration or worsening respiratory/pulmonary status    Treatment:  Skilled instruction completed with patient re: evidenced based practice regarding recommendations and POC, importance of oral care to reduce adverse affects in the event of aspiration, and instruction of recommended compensatory strategies developed based upon clinical exam.     Pt Education: SLP educated the patient re: Role of SLP, rationale for completion of assessment, results of assessment, recommendations and rationale for MBS  Pt Education Response: verbalized understanding, would benefit from ongoing education, RN aware and caregiver aware    Duration/Frequency of Tx: 3-5x/wk    Individuals Consulted:   [x]  Patient     []  NP         [x]  RN   []  RD                   []  MD      [x]  Family Member                        []  PA    []  Other:      Safety Devices / Report:  []  All fall risk precautions in place []  Safety handoff completed with RN  [x]  Bed alarm in place  [x]  Left in bed     []  Chair alarm in place  []  Left in chair   [x]  Call light in reach   []  Other: Total Treatment Time / Charges       Time in Time out Total Time / units   Swallow Eval/Tx Time  [id number] 32 min / 2 units     Signature:  Kaiden Esparza M.A.  [id number] [facility]  Speech Language Pathologist

## 2022-06-18 NOTE — FLOWSHEET NOTE
Reassessment completed at this time. Pt denies further needs at this time. Annmarie care completed, new purewic in place.      Keiry Blackwood RN

## 2022-06-18 NOTE — PROGRESS NOTES
Care rounds completed with Dr. Katie Sanchez and multidisciplinary team. Reviewed labs, meds, VS, assessment, & plan of care for today. Plan Chest Physiotherapy or Medinebs, NT suction if needed. Change IV antibiotics, to Vanco and Zosyn. Give PO medications with Sips if patient tolerates, needs parkinson's medication. See dictated note and new orders for details.        10:00 AM    Per SLP patient can have Ice chips minimally to keep mouth moist. Pt can have meds Crushed in Puree- as patient tolerates. 11:45 AM   Pt's Spouse at bedside and updated on POC for the day.

## 2022-06-18 NOTE — PROGRESS NOTES
Micro called to inform MRSA + of the nares. Contact isolation order placed per protocol.     Manohar Palmer RN

## 2022-06-18 NOTE — ED NOTES
I received a call from ICU stating that the patient's respiratory status was decompensating. I had initially seen her in the ED a few days ago, she was admitted for respiratory failure due to aspiration pneumonia. In the ICU, she was breathing up to 40 breaths/min with oxygen saturations in the high 80s on 100% FiO2 on BiPAP. She has a significant amount of secretions. Although she was alert and oriented, she was not able to maintain her respiratory status on BiPAP and needed to be intubated. Her lungs have diffuse rhonchi. Heart is tachycardic rate and regular rhythm. She was intubated for airway protection please see procedure note for further details. PROCEDURE:  ENDOTRACHEAL INTUBATION  The benefits, risks, and alternatives were discussed with Carlee Duffy or their surrogate. An opportunity to ask questions was provided, and consent was given for the procedure. Carlee Duffy was pre-oxygenated as best as possible. Suction was ready. The patient was sedated, then paralyzed; please see the chart for the medications and dosages administered. The vocal cords were visualized, and the endotracheal tube was inserted without complication. Tracheal position was confirmed using auscultation, capnometry, tube condensation. The tube was appropriately secured. Sedation was provided for endotracheal tube and ventilatory comfort.      Pamella [place]  06/18/22 [id number]

## 2022-06-18 NOTE — PROGRESS NOTES
RT Inhaler-Nebulizer Bronchodilator Protocol Note    There is a bronchodilator order in the chart from a provider indicating to follow the RT Bronchodilator Protocol and there is an Initiate RT Inhaler-Nebulizer Bronchodilator Protocol order as well (see protocol at bottom of note). CXR Findings:  XR CHEST PORTABLE    Result Date: 6/17/2022  Hazy bilateral lobe airspace opacities and small left pleural effusion. This could represent atelectasis or pneumonia in the appropriate clinical setting. XR CHEST PORTABLE    Result Date: 6/17/2022  1. Bibasilar heterogeneous opacities may represent subsegmental atelectasis and/or mild pulmonary edema. Underlying infection is difficult to exclude. Short-term follow-up PA and lateral chest radiographs may be helpful for further evaluation. 2.  Cardiomegaly. The findings from the last RT Protocol Assessment were as follows:   History Pulmonary Disease: (P) Chronic pulmonary disease  Respiratory Pattern: (P) Dyspnea on exertion or RR 21-25 bpm  Breath Sounds: (P) Inspiratory and expiratory or bilateral wheezing and/or rhonchi  Cough: (P) Strong, spontaneous, non-productive  Indication for Bronchodilator Therapy: Decreased or absent breath sounds  Bronchodilator Assessment Score: (P) 10    Aerosolized bronchodilator medication orders have been revised according to the RT Inhaler-Nebulizer Bronchodilator Protocol below. Respiratory Therapist to perform RT Therapy Protocol Assessment initially then follow the protocol. Repeat RT Therapy Protocol Assessment PRN for score 0-3 or on second treatment, BID, and PRN for scores above 3. No Indications - adjust the frequency to every 6 hours PRN wheezing or bronchospasm, if no treatments needed after 48 hours then discontinue using Per Protocol order mode. If indication present, adjust the RT bronchodilator orders based on the Bronchodilator Assessment Score as indicated below.   Use Inhaler orders unless patient has one or more of the following: on home nebulizer, not able to hold breath for 10 seconds, is not alert and oriented, cannot activate and use MDI correctly, or respiratory rate 25 breaths per minute or more, then use the equivalent nebulizer order(s) with same Frequency and PRN reasons based on the score. If a patient is on this medication at home then do not decrease Frequency below that used at home. 0-3 - enter or revise RT bronchodilator order(s) to equivalent RT Bronchodilator order with Frequency of every 4 hours PRN for wheezing or increased work of breathing using Per Protocol order mode. 4-6 - enter or revise RT Bronchodilator order(s) to two equivalent RT bronchodilator orders with one order with BID Frequency and one order with Frequency of every 4 hours PRN wheezing or increased work of breathing using Per Protocol order mode. 7-10 - enter or revise RT Bronchodilator order(s) to two equivalent RT bronchodilator orders with one order with TID Frequency and one order with Frequency of every 4 hours PRN wheezing or increased work of breathing using Per Protocol order mode. 11-13 - enter or revise RT Bronchodilator order(s) to one equivalent RT bronchodilator order with QID Frequency and an Albuterol order with Frequency of every 4 hours PRN wheezing or increased work of breathing using Per Protocol order mode. Greater than 13 - enter or revise RT Bronchodilator order(s) to one equivalent RT bronchodilator order with every 4 hours Frequency and an Albuterol order with Frequency of every 2 hours PRN wheezing or increased work of breathing using Per Protocol order mode.          Electronically signed by Dyan Long RCP on 6/17/2022 at 8:00 PM

## 2022-06-18 NOTE — PROGRESS NOTES
6/18  Vanc random = 16.6 mcg/mL at 0559. Give vancomycin 750 mg x1. Check vanc random tomorrow in the AM (6/19 at 0600).   Karyn Salmon, Dedra  6/18/2022 7:18 AM

## 2022-06-18 NOTE — PROGRESS NOTES
Order for rapid sequence intubation obtained. Patient pre-oxygenated to with BiPAP to a saturation 94 %.     mg of Versed ordered and administered at    30 mg of Etomidate ordered and administered at 4:44 PM      MCG of Fentanyl ordered and administered at     mg of Succinylcholine ordered and administered at    120 mg of BOBY ordered and administered at 4:45 PM      mg of  ordered and administered at      Recent Labs     06/17/22  0310 06/18/22  0559    139   K 5.0 4.2   BUN 40* 42*   CREATININE 1.6* 2.0*         Dr Gilda Grady inserted a number  7.5 ET tube. The ET tube is secured at 23 cm measured at the patients lip line. Breath sounds are equal bilaterally. There is a positive color change noted in the colorimetric CO2 detector. RT connected the patient to a ventilator. See orders for ventilator orders. OG tube inserted to a depth of 70 cm. Ausculation of air bolus is positive. Aspirated stomach contents are green. POST Intubation ORDERS    ABG ordered in 2 hours  Portable Chest x-ray ordered to confirm placement of the patients ET tube and gastric tube. Bilateral wrist restraints ordered and initiated. Pulses present distal to restraints. Propofol drip ordered for sedation. See EMAR and orders. See physician note to follow.  Electronically signed by Fidel Gabriel RN on 6/18/2022 at 4:40 PM

## 2022-06-18 NOTE — PROGRESS NOTES
Page to Dr Estephanie Mao. RT at bedside and placed patient on Bipap. Pt's SpO2 88% on 100% FiO2, 16/8. Pt having difficulty clearing secretions. And becoming more labile. RR elevated 50s    4:15 PM     Call to Spouse to update on changes. And Current plan of care.

## 2022-06-18 NOTE — PROGRESS NOTES
Pulmonary [facility]                             Pulmonary Consult/Progress Note :              Reason for Consultation: Critical care management  CC : Worsening shortness of breath and respiratory distress       HPI:   Developed worsening respiratory failure  Shortness of breath has get worse despite multiple attempts of suction and get a lot of secretions  Still need high O2   No fever or chills no chest pain    Satting in the 97 and after suction shortness of breath slightly  PHYSICAL EXAMINATION:     VITAL SIGNS:  /64   Pulse 100   Temp 98.2 °F (36.8 °C) (Axillary)   Resp (!) 37   Ht 5' 6\" (1.676 m)   Wt 270 lb (122.5 kg)   SpO2 97%   BMI 43.58 kg/m²   Wt Readings from Last 3 Encounters:   06/18/22 270 lb (122.5 kg)   05/24/22 264 lb (119.7 kg)   05/11/22 258 lb (117 kg)     Temp Readings from Last 3 Encounters:   06/18/22 98.2 °F (36.8 °C) (Axillary)   05/03/22 97.2 °F (36.2 °C)   04/21/22 99.4 °F (37.4 °C) (Oral)     TMAX:  BP Readings from Last 3 Encounters:   06/18/22 135/64   05/24/22 118/60   04/28/22 126/70     Pulse Readings from Last 3 Encounters:   06/18/22 100   05/24/22 64   04/28/22 68           INTAKE/OUTPUTS:  I/O last 3 completed shifts:   In: 1338.1 [I.V.:377.4; IV Piggyback:960.7]  Out: 350 [Urine:350]    Intake/Output Summary (Last 24 hours) at 6/18/2022 0849  Last data filed at 6/18/2022 0746  Gross per 24 hour   Intake 1757.83 ml   Output 350 ml   Net 1407.83 ml       General Appearance: alert and oriented to person, place and time, well-developed and   well-nourished, in no acute distress   Eyes: pupils equal, round, and reactive to light, extraocular eye movements intact, conjunctivae normal and sclera anicteric   Neck: neck supple and non tender without mass, no thyromegaly, no thyroid nodules and no cervical adenopathy   Pulmonary/Chest: Rhonchi bilaterally  Cardiovascular: normal rate, regular rhythm, normal S1 and S2, no murmurs, rubs, clicks or gallops, distal pulses intact, no carotid bruits, no murmurs, no gallops, no carotid bruits and no JVD   Abdomen: obese, soft, non-tender, non-distended, normal bowel sounds, no masses or organomegaly   Extremities: No edema or cyanosis  Musculoskeletal: normal range of motion, no joint swelling, deformity or tenderness   Neurologic: reflexes normal and symmetric, no cranial nerve deficit noted    LABS/IMAGING:    CBC:  Lab Results   Component Value Date    WBC 19.7 (H) 06/18/2022    HGB 8.7 (L) 06/18/2022    HCT 26.9 (L) 06/18/2022    MCV 86.4 06/18/2022     06/18/2022    LYMPHOPCT 3.6 06/18/2022    RBC 3.11 (L) 06/18/2022    MCH 28.1 06/18/2022    MCHC 32.5 06/18/2022    RDW 13.9 06/18/2022    NEUTOPHILPCT 90.6 06/18/2022    MONOPCT 4.9 06/18/2022    EOSPCT 1.3 09/23/2011    BASOPCT 0.7 06/18/2022    NEUTROABS 17.8 (H) 06/18/2022    LYMPHSABS 0.7 (L) 06/18/2022    MONOSABS 1.0 06/18/2022    EOSABS 0.0 06/18/2022    BASOSABS 0.1 06/18/2022       Recent Labs     06/18/22  0559 06/17/22  0310   WBC 19.7* 21.2*   HGB 8.7* 9.5*   HCT 26.9* 29.4*   MCV 86.4 87.1    338       BMP:   Recent Labs     06/17/22  0310 06/18/22  0559    139   K 5.0 4.2   CL 98* 101   CO2 28 26   BUN 40* 42*   CREATININE 1.6* 2.0*       MG:   Lab Results   Component Value Date    MG 2.00 10/08/2021     Ca/Phos:   Lab Results   Component Value Date    CALCIUM 9.2 06/18/2022    PHOS 4.9 04/07/2022     Amylase: No results found for: AMYLASE  Lipase:   Lab Results   Component Value Date    LIPASE 17.0 10/07/2021     LIVER PROFILE:   Recent Labs     06/17/22  0310 06/18/22  0559   AST 15 11*   ALT <5* <5*   BILITOT <0.2 0.4   ALKPHOS 104 92       PT/INR: No results for input(s): PROTIME, INR in the last 72 hours. APTT: No results for input(s): APTT in the last 72 hours.     Cardiac Enzymes:  Lab Results   Component Value Date    CKTOTAL 66 09/08/2018    TROPONINI <0.01 06/17/2022                   PROBLEM LIST:  Patient Active Problem List   Diagnosis    GERD (gastroesophageal reflux disease)    Essential hypertension    Hyperlipidemia    Restless leg syndrome    Anemia of chronic disease    Postural dizziness    Moderate persistent asthma    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-sided sciatica    Morbid obesity with BMI of 40.0-44.9, adult ([facility])    Type 2 diabetes mellitus with stage 4 chronic kidney disease, without long-term current use of insulin ([facility])    CAD (coronary artery disease)    Parkinson's disease ([address]    Severe obstructive sleep apnea    Hypothyroidism    Hepatic steatosis    Diverticulosis    Blind left eye    L carotid artery stenosis s/p CEA    Septicemia ([address]    PAF (paroxysmal atrial fibrillation) ([facility])    COPD (chronic obstructive pulmonary disease) ([facility])    Acute respiratory failure with hypoxia ([facility])    Diastolic congestive heart failure ([facility])    Partial symptomatic epilepsy with complex partial seizures, not intractable, without status epilepticus ([facility]    Chronic kidney disease    Colonic pseudoobstruction    Sialadenitis    Chest pain    Pulmonary HTN ([address]    Mitral valve insufficiency    C. difficile colitis    Laceration of left lower extremity    Pulmonary nodules    Sensorineural hearing loss, bilateral    Shortness of breath    Aspiration pneumonia of left lower lobe ([facility])               ASSESSMENT:  1.)  Acute hypoxemic respiratory failure secondary to aspiration pneumonia  2.)  History of Parkinson disease  3.)  Obstructive sleep apnea   4.)  History of A. fib  5.)  History of C. difficile in the past  6-chronic kidney disease creatinine seems 1.6 at baseline  7_Possible congestive heart failure we will review previous cardiac study      PLAN:    Hypoxia got worse sec intra pulmonary shunt with collapsed  ENT suction   Hypertonic   Chest vest  If hypoxia worse may try noninvasive mechanical ventilation for short-term and if not any better she will end up in needing mechanical ventilator  If not better then options include bronch however with severe hypoxia she may end up on the vent    CXR collapsed lower lobe   *-Patient clearly with her history of Parkinson at high risk for aspiration at this time she will need full evaluation with swallow study I will hold on barium swallow study at this time to avoid aspiration  Her chest x-ray showcollapse lobe lower lobes so I will hold on bronchoscopy however if her breathing get worse or she get intubated we will proceed with bronchoscopy  *-I would recommended aggressive pulmonary toilet, Mucinex, hypertonic saline, incentive spirometry along with flutter valve  *-Continue with here DVT prophylaxis she is on Eliquis if she cannot take orally we will change to Lovenox subcu therapeutic  *-We will try to change all other medication to IV ,if not ok for parkinson med's   *_We will see how she is doing with next 24 hours n.p.o. we will keep checking her sugar and hold insulin at this time since she will not be eating  Ok to try oral med's  Swallow study on Monday  Discuss with RT about aggressive pulmonary toilet   We will keep in the ICU for observation    Care reviewed with nursing staff, medical and surgical specialty care, primary care and the patient's family as available. Chart review/lab review/X-ray viewing/documentation and had long Conversation with patient/family re: prognosis, care options and any end of life issues:      Critical care time spent reviewing labs/films, examining patient, collaborating with other physicians more than 35  Minutes  excluding procedures . Bertha Corral M.D.   6/18/2022  1:23 PM      There is no family around to discuss however we will try to reach        Thank you very much for allowing me to participate in the care of this pleasant patient , should you have any questions ,please do not hesitate to contact me      Ronan Wyatt MD,[facility]  Pulmonary&Critical Care Medicine   Director of [address] [place] [address] Director of [address] [place] [address]    Teresa Wang    NOTE: This report was transcribed using voice recognition software. Every effort was made to ensure accuracy; however, inadvertent computerized transcription errors may be present.

## 2022-06-18 NOTE — PROGRESS NOTES
Reassessment completed (see Flowsheet). All ICU lines remain intact, ICU monitoring continued-   Infusing:  NS (See MAR). pt remains on Vapotherm and NRB- RT at bedside to change to Bipap. Pt bathed with Saop/ Water and CHG. Pure wick and attends changed. Lung sounds Rhonchi. pt's blood pressures WDL      Continuing to monitor.

## 2022-06-18 NOTE — PROGRESS NOTES
Shift handoff report given to Alejandra/ Adilia Lopez RN at bedside. Pt is sedated on vent  IV handoff completed. Call light within reach, bed in lowest position, bed alarm on. End of shift checks completed. Pt has been free of falls for duration of shift. Rad Jensen

## 2022-06-18 NOTE — PROGRESS NOTES
Pt intubated with 7.5ett without difficulry  SpO2 92%  Positive color change  Bilateral BS noted           06/18/22 1717   NICU Vent Information   Vt (Set, mL) 380 mL   Vt Exhaled 400 mL   Resp Rate (Set) 20 bmp   Rate Measured 20 br/min   Minute Volume 7.97 Liters   Peak Flow 50 L/min   Pressure Support 0 cmH20   FiO2  100 %   Peak Inspiratory Pressure 28 cmH2O   I:E Ratio 1:2.70   Sensitivity 3   High Peep/I Pressure 0   PEEP/CPAP (cmH2O) 8   I Time/ I Time % 0 s   Mean Airway Pressure 14 cmH20   Cough/Sputum   Sputum How Obtained Endotracheal   Cough Productive   Sputum Amount Moderate   Sputum Color Dark Yellow   Tenacity Tenacious   Additional Respiratory Assessments   Heart Rate (!) 105   Resp 13   SpO2 96 %   Position Semi-Mccollum's   Humidification Source Heated wire   Humidification Temp 37   Vent Alarm Settings   High Pressure  45 cmH2O   Low Minute Volume Alarm 2 L/min   RR High 40 br/min   ETT (adult)   Placement Date/Time: 06/18/22 1650   Present on Admission/Arrival: No  Placement Verified By: Direct visualization;Colorimetric ETCO2 device  Preoxygenation: Yes  Airway Tube Size: 8 mm  Laryngoscope: GlideScope  Blade Size: 3  Location: Oral  Secured. ..    Secured At 23 cm   Measured From Lips   ETT Placement Center   Secured By Commercial tube hughes

## 2022-06-18 NOTE — PROGRESS NOTES
Inhaler-Nebulizer Bronchodilator Protocol below. Respiratory Therapist to perform RT Therapy Protocol Assessment initially then follow the protocol. Repeat RT Therapy Protocol Assessment PRN for score 0-3 or on second treatment, BID, and PRN for scores above 3. No Indications - adjust the frequency to every 6 hours PRN wheezing or bronchospasm, if no treatments needed after 48 hours then discontinue using Per Protocol order mode. If indication present, adjust the RT bronchodilator orders based on the Bronchodilator Assessment Score as indicated below. Use Inhaler orders unless patient has one or more of the following: on home nebulizer, not able to hold breath for 10 seconds, is not alert and oriented, cannot activate and use MDI correctly, or respiratory rate 25 breaths per minute or more, then use the equivalent nebulizer order(s) with same Frequency and PRN reasons based on the score. If a patient is on this medication at home then do not decrease Frequency below that used at home. 0-3 - enter or revise RT bronchodilator order(s) to equivalent RT Bronchodilator order with Frequency of every 4 hours PRN for wheezing or increased work of breathing using Per Protocol order mode. 4-6 - enter or revise RT Bronchodilator order(s) to two equivalent RT bronchodilator orders with one order with BID Frequency and one order with Frequency of every 4 hours PRN wheezing or increased work of breathing using Per Protocol order mode. 7-10 - enter or revise RT Bronchodilator order(s) to two equivalent RT bronchodilator orders with one order with TID Frequency and one order with Frequency of every 4 hours PRN wheezing or increased work of breathing using Per Protocol order mode.        11-13 - enter or revise RT Bronchodilator order(s) to one equivalent RT bronchodilator order with QID Frequency and an Albuterol order with Frequency of every 4 hours PRN wheezing or increased work of breathing using Per Protocol order mode. Greater than 13 - enter or revise RT Bronchodilator order(s) to one equivalent RT bronchodilator order with every 4 hours Frequency and an Albuterol order with Frequency of every 2 hours PRN wheezing or increased work of breathing using Per Protocol order mode.        Electronically signed by Vincent River RCP on 6/18/2022 at 7:50 PM

## 2022-06-19 ENCOUNTER — APPOINTMENT (OUTPATIENT)
Dept: GENERAL RADIOLOGY | Age: 82
DRG: 004 | End: 2022-06-19
Payer: MEDICARE

## 2022-06-19 LAB
A/G RATIO: 0.7 (ref 1.1–2.2)
ALBUMIN SERPL-MCNC: 2.3 G/DL (ref 3.4–5)
ALP BLD-CCNC: 85 U/L (ref 40–129)
ALT SERPL-CCNC: <5 U/L (ref 10–40)
ANION GAP SERPL CALCULATED.3IONS-SCNC: 16 MMOL/L (ref 3–16)
AST SERPL-CCNC: 13 U/L (ref 15–37)
BASE EXCESS ARTERIAL: -1.3 MMOL/L (ref -3–3)
BASOPHILS ABSOLUTE: 0.1 K/UL (ref 0–0.2)
BASOPHILS RELATIVE PERCENT: 0.4 %
BILIRUB SERPL-MCNC: 0.3 MG/DL (ref 0–1)
BUN BLDV-MCNC: 35 MG/DL (ref 7–20)
CALCIUM SERPL-MCNC: 8.3 MG/DL (ref 8.3–10.6)
CARBOXYHEMOGLOBIN ARTERIAL: 0.2 % (ref 0–1.5)
CHLORIDE BLD-SCNC: 105 MMOL/L (ref 99–110)
CO2: 18 MMOL/L (ref 21–32)
CREAT SERPL-MCNC: 1.8 MG/DL (ref 0.6–1.2)
CULTURE, RESPIRATORY: NORMAL
EOSINOPHILS ABSOLUTE: 0.1 K/UL (ref 0–0.6)
EOSINOPHILS RELATIVE PERCENT: 0.9 %
GFR AFRICAN AMERICAN: 33
GFR NON-AFRICAN AMERICAN: 27
GLUCOSE BLD-MCNC: 111 MG/DL (ref 70–99)
GLUCOSE BLD-MCNC: 115 MG/DL (ref 70–99)
GLUCOSE BLD-MCNC: 119 MG/DL (ref 70–99)
GLUCOSE BLD-MCNC: 125 MG/DL (ref 70–99)
GLUCOSE BLD-MCNC: 126 MG/DL (ref 70–99)
GLUCOSE BLD-MCNC: 131 MG/DL (ref 70–99)
GLUCOSE BLD-MCNC: 135 MG/DL (ref 70–99)
GRAM STAIN RESULT: NORMAL
HCO3 ARTERIAL: 23.5 MMOL/L (ref 21–29)
HCT VFR BLD CALC: 26.7 % (ref 36–48)
HEMOGLOBIN, ART, EXTENDED: 7 G/DL (ref 12–16)
HEMOGLOBIN: 8.4 G/DL (ref 12–16)
INR BLD: 1.61 (ref 0.87–1.14)
L. PNEUMOPHILA SEROGP 1 UR AG: NORMAL
LYMPHOCYTES ABSOLUTE: 1.1 K/UL (ref 1–5.1)
LYMPHOCYTES RELATIVE PERCENT: 7.6 %
MCH RBC QN AUTO: 27.7 PG (ref 26–34)
MCHC RBC AUTO-ENTMCNC: 31.5 G/DL (ref 31–36)
MCV RBC AUTO: 88.1 FL (ref 80–100)
METHEMOGLOBIN ARTERIAL: 0.1 %
MONOCYTES ABSOLUTE: 0.9 K/UL (ref 0–1.3)
MONOCYTES RELATIVE PERCENT: 6.3 %
NEUTROPHILS ABSOLUTE: 12.7 K/UL (ref 1.7–7.7)
NEUTROPHILS RELATIVE PERCENT: 84.8 %
O2 SAT, ARTERIAL: 93.5 %
O2 THERAPY: ABNORMAL
PCO2 ARTERIAL: 39.7 MMHG (ref 35–45)
PDW BLD-RTO: 14 % (ref 12.4–15.4)
PERFORMED ON: ABNORMAL
PH ARTERIAL: 7.39 (ref 7.35–7.45)
PLATELET # BLD: 220 K/UL (ref 135–450)
PMV BLD AUTO: 8.4 FL (ref 5–10.5)
PO2 ARTERIAL: 68.1 MMHG (ref 75–108)
POTASSIUM REFLEX MAGNESIUM: 3.8 MMOL/L (ref 3.5–5.1)
PROTHROMBIN TIME: 19 SEC (ref 11.7–14.5)
RBC # BLD: 3.03 M/UL (ref 4–5.2)
SODIUM BLD-SCNC: 139 MMOL/L (ref 136–145)
STREP PNEUMONIAE ANTIGEN, URINE: NORMAL
TCO2 ARTERIAL: 24.8 MMOL/L
TOTAL PROTEIN: 5.5 G/DL (ref 6.4–8.2)
VANCOMYCIN RANDOM: 14.7 UG/ML
WBC # BLD: 15 K/UL (ref 4–11)

## 2022-06-19 PROCEDURE — 6360000002 HC RX W HCPCS: Performed by: INTERNAL MEDICINE

## 2022-06-19 PROCEDURE — 99233 SBSQ HOSP IP/OBS HIGH 50: CPT | Performed by: INTERNAL MEDICINE

## 2022-06-19 PROCEDURE — 85025 COMPLETE CBC W/AUTO DIFF WBC: CPT

## 2022-06-19 PROCEDURE — 87081 CULTURE SCREEN ONLY: CPT

## 2022-06-19 PROCEDURE — 2580000003 HC RX 258: Performed by: INTERNAL MEDICINE

## 2022-06-19 PROCEDURE — 2000000000 HC ICU R&B

## 2022-06-19 PROCEDURE — 6370000000 HC RX 637 (ALT 250 FOR IP): Performed by: INTERNAL MEDICINE

## 2022-06-19 PROCEDURE — 87631 RESP VIRUS 3-5 TARGETS: CPT

## 2022-06-19 PROCEDURE — 36592 COLLECT BLOOD FROM PICC: CPT

## 2022-06-19 PROCEDURE — 0BC78ZZ EXTIRPATION OF MATTER FROM LEFT MAIN BRONCHUS, VIA NATURAL OR ARTIFICIAL OPENING ENDOSCOPIC: ICD-10-PCS | Performed by: INTERNAL MEDICINE

## 2022-06-19 PROCEDURE — 0B9F8ZX DRAINAGE OF RIGHT LOWER LUNG LOBE, VIA NATURAL OR ARTIFICIAL OPENING ENDOSCOPIC, DIAGNOSTIC: ICD-10-PCS | Performed by: INTERNAL MEDICINE

## 2022-06-19 PROCEDURE — 36415 COLL VENOUS BLD VENIPUNCTURE: CPT

## 2022-06-19 PROCEDURE — 71045 X-RAY EXAM CHEST 1 VIEW: CPT

## 2022-06-19 PROCEDURE — 82803 BLOOD GASES ANY COMBINATION: CPT

## 2022-06-19 PROCEDURE — 94761 N-INVAS EAR/PLS OXIMETRY MLT: CPT

## 2022-06-19 PROCEDURE — 87205 SMEAR GRAM STAIN: CPT

## 2022-06-19 PROCEDURE — 94003 VENT MGMT INPAT SUBQ DAY: CPT

## 2022-06-19 PROCEDURE — 51798 US URINE CAPACITY MEASURE: CPT

## 2022-06-19 PROCEDURE — 87102 FUNGUS ISOLATION CULTURE: CPT

## 2022-06-19 PROCEDURE — 87106 FUNGI IDENTIFICATION YEAST: CPT

## 2022-06-19 PROCEDURE — 2700000000 HC OXYGEN THERAPY PER DAY

## 2022-06-19 PROCEDURE — 85610 PROTHROMBIN TIME: CPT

## 2022-06-19 PROCEDURE — 51702 INSERT TEMP BLADDER CATH: CPT

## 2022-06-19 PROCEDURE — 87070 CULTURE OTHR SPECIMN AEROBIC: CPT

## 2022-06-19 PROCEDURE — 94640 AIRWAY INHALATION TREATMENT: CPT

## 2022-06-19 PROCEDURE — 80202 ASSAY OF VANCOMYCIN: CPT

## 2022-06-19 PROCEDURE — 31624 DX BRONCHOSCOPE/LAVAGE: CPT | Performed by: INTERNAL MEDICINE

## 2022-06-19 PROCEDURE — 99291 CRITICAL CARE FIRST HOUR: CPT | Performed by: INTERNAL MEDICINE

## 2022-06-19 PROCEDURE — 31622 DX BRONCHOSCOPE/WASH: CPT

## 2022-06-19 PROCEDURE — 2500000003 HC RX 250 WO HCPCS: Performed by: INTERNAL MEDICINE

## 2022-06-19 PROCEDURE — 80053 COMPREHEN METABOLIC PANEL: CPT

## 2022-06-19 RX ORDER — SODIUM CHLORIDE FOR INHALATION 3 %
4 VIAL, NEBULIZER (ML) INHALATION PRN
Status: DISCONTINUED | OUTPATIENT
Start: 2022-06-19 | End: 2022-07-01 | Stop reason: ALTCHOICE

## 2022-06-19 RX ORDER — LIDOCAINE HYDROCHLORIDE 40 MG/ML
SOLUTION TOPICAL
Status: DISPENSED
Start: 2022-06-19 | End: 2022-06-19

## 2022-06-19 RX ORDER — ALBUTEROL SULFATE 2.5 MG/3ML
2.5 SOLUTION RESPIRATORY (INHALATION) EVERY 4 HOURS PRN
Status: DISCONTINUED | OUTPATIENT
Start: 2022-06-19 | End: 2022-06-27

## 2022-06-19 RX ORDER — SODIUM CHLORIDE 9 MG/ML
25 INJECTION, SOLUTION INTRAVENOUS PRN
Status: DISCONTINUED | OUTPATIENT
Start: 2022-06-19 | End: 2022-07-19 | Stop reason: HOSPADM

## 2022-06-19 RX ORDER — LIDOCAINE HYDROCHLORIDE 10 MG/ML
5 INJECTION, SOLUTION INFILTRATION; PERINEURAL ONCE
Status: DISCONTINUED | OUTPATIENT
Start: 2022-06-19 | End: 2022-07-02

## 2022-06-19 RX ORDER — SODIUM CHLORIDE 0.9 % (FLUSH) 0.9 %
5-40 SYRINGE (ML) INJECTION EVERY 12 HOURS SCHEDULED
Status: DISCONTINUED | OUTPATIENT
Start: 2022-06-19 | End: 2022-06-19 | Stop reason: SDUPTHER

## 2022-06-19 RX ORDER — IPRATROPIUM BROMIDE AND ALBUTEROL SULFATE 2.5; .5 MG/3ML; MG/3ML
1 SOLUTION RESPIRATORY (INHALATION) EVERY 4 HOURS
Status: DISCONTINUED | OUTPATIENT
Start: 2022-06-19 | End: 2022-06-27

## 2022-06-19 RX ORDER — SODIUM CHLORIDE 0.9 % (FLUSH) 0.9 %
5-40 SYRINGE (ML) INJECTION PRN
Status: DISCONTINUED | OUTPATIENT
Start: 2022-06-19 | End: 2022-06-19 | Stop reason: SDUPTHER

## 2022-06-19 RX ADMIN — CARBIDOPA AND LEVODOPA 1 TABLET: 25; 100 TABLET ORAL at 07:58

## 2022-06-19 RX ADMIN — PROPOFOL 20 MCG/KG/MIN: 10 INJECTION, EMULSION INTRAVENOUS at 21:02

## 2022-06-19 RX ADMIN — Medication 15 ML: at 07:59

## 2022-06-19 RX ADMIN — MONTELUKAST SODIUM 10 MG: 10 TABLET, COATED ORAL at 07:58

## 2022-06-19 RX ADMIN — APIXABAN 2.5 MG: 5 TABLET, FILM COATED ORAL at 07:58

## 2022-06-19 RX ADMIN — MUPIROCIN: 20 OINTMENT TOPICAL at 19:51

## 2022-06-19 RX ADMIN — PROPAFENONE HYDROCHLORIDE 150 MG: 150 TABLET, FILM COATED ORAL at 14:58

## 2022-06-19 RX ADMIN — LAMOTRIGINE 50 MG: 25 TABLET ORAL at 08:00

## 2022-06-19 RX ADMIN — ALBUTEROL SULFATE 2.5 MG: 2.5 SOLUTION RESPIRATORY (INHALATION) at 10:59

## 2022-06-19 RX ADMIN — PIPERACILLIN AND TAZOBACTAM 3375 MG: 3; .375 INJECTION, POWDER, LYOPHILIZED, FOR SOLUTION INTRAVENOUS at 12:01

## 2022-06-19 RX ADMIN — CARBIDOPA AND LEVODOPA 1 TABLET: 25; 100 TABLET ORAL at 19:51

## 2022-06-19 RX ADMIN — LAMOTRIGINE 50 MG: 25 TABLET ORAL at 21:28

## 2022-06-19 RX ADMIN — ATORVASTATIN CALCIUM 40 MG: 40 TABLET, FILM COATED ORAL at 19:51

## 2022-06-19 RX ADMIN — PIPERACILLIN AND TAZOBACTAM 3375 MG: 3; .375 INJECTION, POWDER, LYOPHILIZED, FOR SOLUTION INTRAVENOUS at 03:16

## 2022-06-19 RX ADMIN — SODIUM CHLORIDE 30 MG/ML INHALATION SOLUTION 4 ML: 30 SOLUTION INHALANT at 11:00

## 2022-06-19 RX ADMIN — PROPOFOL 40 MCG/KG/MIN: 10 INJECTION, EMULSION INTRAVENOUS at 05:32

## 2022-06-19 RX ADMIN — DULOXETINE HYDROCHLORIDE 60 MG: 60 CAPSULE, DELAYED RELEASE ORAL at 07:58

## 2022-06-19 RX ADMIN — Medication 75 MCG/HR: at 10:14

## 2022-06-19 RX ADMIN — MIDAZOLAM HYDROCHLORIDE 2 MG: 1 INJECTION, SOLUTION INTRAMUSCULAR; INTRAVENOUS at 02:48

## 2022-06-19 RX ADMIN — SODIUM CHLORIDE: 9 INJECTION, SOLUTION INTRAVENOUS at 17:09

## 2022-06-19 RX ADMIN — IPRATROPIUM BROMIDE AND ALBUTEROL SULFATE 1 AMPULE: 2.5; .5 SOLUTION RESPIRATORY (INHALATION) at 22:37

## 2022-06-19 RX ADMIN — CARBIDOPA AND LEVODOPA 1 TABLET: 25; 100 TABLET ORAL at 14:58

## 2022-06-19 RX ADMIN — PROPOFOL 35 MCG/KG/MIN: 10 INJECTION, EMULSION INTRAVENOUS at 02:28

## 2022-06-19 RX ADMIN — ALBUTEROL SULFATE 2.5 MG: 2.5 SOLUTION RESPIRATORY (INHALATION) at 03:21

## 2022-06-19 RX ADMIN — SODIUM CHLORIDE, PRESERVATIVE FREE 10 ML: 5 INJECTION INTRAVENOUS at 21:38

## 2022-06-19 RX ADMIN — PROPOFOL 35 MCG/KG/MIN: 10 INJECTION, EMULSION INTRAVENOUS at 08:35

## 2022-06-19 RX ADMIN — MUPIROCIN: 20 OINTMENT TOPICAL at 07:59

## 2022-06-19 RX ADMIN — IPRATROPIUM BROMIDE AND ALBUTEROL SULFATE 1 AMPULE: 2.5; .5 SOLUTION RESPIRATORY (INHALATION) at 15:31

## 2022-06-19 RX ADMIN — VANCOMYCIN HYDROCHLORIDE 1000 MG: 1 INJECTION, POWDER, LYOPHILIZED, FOR SOLUTION INTRAVENOUS at 08:02

## 2022-06-19 RX ADMIN — Medication 2 PUFF: at 19:19

## 2022-06-19 RX ADMIN — CARBIDOPA AND LEVODOPA 1 TABLET: 25; 100 TABLET ORAL at 17:08

## 2022-06-19 RX ADMIN — PROPOFOL 30 MCG/KG/MIN: 10 INJECTION, EMULSION INTRAVENOUS at 15:58

## 2022-06-19 RX ADMIN — PROPOFOL 35 MCG/KG/MIN: 10 INJECTION, EMULSION INTRAVENOUS at 12:58

## 2022-06-19 RX ADMIN — PIPERACILLIN AND TAZOBACTAM 3375 MG: 3; .375 INJECTION, POWDER, LYOPHILIZED, FOR SOLUTION INTRAVENOUS at 19:54

## 2022-06-19 RX ADMIN — PROPAFENONE HYDROCHLORIDE 150 MG: 150 TABLET, FILM COATED ORAL at 06:18

## 2022-06-19 RX ADMIN — Medication 15 ML: at 19:51

## 2022-06-19 RX ADMIN — Medication 2 PUFF: at 07:29

## 2022-06-19 RX ADMIN — IPRATROPIUM BROMIDE AND ALBUTEROL SULFATE 1 AMPULE: 2.5; .5 SOLUTION RESPIRATORY (INHALATION) at 19:15

## 2022-06-19 RX ADMIN — PROPAFENONE HYDROCHLORIDE 150 MG: 150 TABLET, FILM COATED ORAL at 21:28

## 2022-06-19 RX ADMIN — ALBUTEROL SULFATE 2.5 MG: 2.5 SOLUTION RESPIRATORY (INHALATION) at 07:28

## 2022-06-19 ASSESSMENT — PULMONARY FUNCTION TESTS
PIF_VALUE: 26
PIF_VALUE: 27
PIF_VALUE: 30
PIF_VALUE: 28
PIF_VALUE: 23
PIF_VALUE: 25
PIF_VALUE: 26
PIF_VALUE: 29
PIF_VALUE: 27
PIF_VALUE: 26
PIF_VALUE: 30
PIF_VALUE: 26
PIF_VALUE: 27
PIF_VALUE: 26
PIF_VALUE: 28
PIF_VALUE: 27
PIF_VALUE: 29
PIF_VALUE: 26
PIF_VALUE: 29
PIF_VALUE: 27
PIF_VALUE: 26
PIF_VALUE: 23
PIF_VALUE: 26
PIF_VALUE: 29
PIF_VALUE: 30
PIF_VALUE: 29

## 2022-06-19 NOTE — PROGRESS NOTES
Peep decraesed to 8cwp         06/19/22 0900   NICU Vent Information   Vt (Set, mL) 380 mL   Vt Exhaled 421 mL   Resp Rate (Set) 20 bmp   Rate Measured 20 br/min   Minute Volume 7.92 Liters   Peak Flow 50 L/min   Pressure Support 0 cmH20   FiO2  50 %   Peak Inspiratory Pressure 26 cmH2O   I:E Ratio 1:2.70   Sensitivity 3   High Peep/I Pressure 0   PEEP/CPAP (cmH2O) 8   I Time/ I Time % 0 s   Mean Airway Pressure 15 cmH20   Additional Respiratory Assessments   Heart Rate 99   Resp 20   SpO2 99 %   Vent Alarm Settings   High Pressure  45 cmH2O   Low Minute Volume Alarm 2 L/min   RR High 40 br/min

## 2022-06-19 NOTE — PROGRESS NOTES
Reassessment completed (see Flowsheet). All ICU lines remain intact, ICU monitoring continued-   Infusing:  Propofol, Fentanyl, Na Hco3 (see MAR). pt remains on Vent PEEP 8, FiO2 decreased to 40%. Lung sounds Clear/ Diminished after bronch.    pt's blood pressures WDL. Continuing to monitor.

## 2022-06-19 NOTE — PROGRESS NOTES
6/19  Vanc random = 14.7 mcg/mL at 0549. Renal function still poor. Give vancomycin 1000 mg x1. Recheck vanc random tomorrow in the AM (6/20 at 0600).   Roman Miller, PharmD  6/19/2022 7:04 AM

## 2022-06-19 NOTE — PROGRESS NOTES
Admit: 2022    Name:  Minus Maltese  Room:  [facility]-  MRN:    [id number]    Critical Care Daily Progress Note for 2022     Admitted with respiratory failure    Interval History:     Transfered to the ICU on  for worsening respiratory status and increasing oxygen requirement. Placed on Vapotherm. Currently on 100% 40 L in addition to a nonrebreather      Patient had to be intubated yesterday because of persistent hypoxia on Vapotherm. Underwent bronc this morning.   Bloody secretions +      Scheduled Meds:   lidocaine        ipratropium-albuterol  1 ampule Inhalation Q4H    lidocaine 1 % injection  5 mL IntraDERmal Once    mupirocin   Nasal BID    piperacillin-tazobactam  3,375 mg IntraVENous Q8H    chlorhexidine  15 mL Mouth/Throat BID    sodium chloride flush  10 mL IntraVENous 2 times per day    [Held by provider] apixaban  2.5 mg Oral BID    atorvastatin  40 mg Oral Nightly    carbidopa-levodopa  1 tablet Oral 4x Daily    DULoxetine  60 mg Oral Daily    budesonide-formoterol  2 puff Inhalation BID    lamoTRIgine  50 mg Oral BID    montelukast  10 mg Oral Daily    propafenone  150 mg Oral 3 times per day    vancomycin (VANCOCIN) intermittent dosing (placeholder)   Other RX Placeholder       Continuous Infusions:   sodium chloride      propofol 35 mcg/kg/min (22 0835)    fentaNYL 75 mcg/hr (22 1014)    sodium chloride      sodium chloride Stopped (22 1336)       PRN Meds:  sodium chloride (Inhalant), albuterol, sodium chloride, fentaNYL **AND** fentaNYL, midazolam, fentanNYL, sodium chloride flush, sodium chloride, potassium chloride, magnesium sulfate, promethazine **OR** ondansetron, acetaminophen **OR** acetaminophen, albuterol                  Objective:     Temp  Av.3 °F (36.8 °C)  Min: 97.9 °F (36.6 °C)  Max: 98.9 °F (37.2 °C)  Pulse  Av.1  Min: 87  Max: 116  BP  Min: 89/40  Max: 149/51  SpO2  Av.1 %  Min: 85 %  Max: 99 %  FiO2 Av.6 %  Min: 50 %  Max: 100 %  Patient Vitals for the past 4 hrs:   BP Temp Temp src Pulse Resp SpO2   22 1100 (!) 130/42 98.4 °F (36.9 °C) Bladder (!) 105 20 95 %   22 1000 (!) 140/45 -- -- 100 20 98 %   22 0900 (!) 137/51 -- -- 99 20 99 %   22 0830 -- -- -- (!) 101 20 98 %   22 0800 (!) 89/40 97.9 °F (36.6 °C) Bladder 92 20 95 %         Intake/Output Summary (Last 24 hours) at 2022 1159  Last data filed at 2022  Gross per 24 hour   Intake 3173.72 ml   Output 910 ml   Net 2263.72 ml       Physical Exam:    General Appearance: Sedated, intubated  Eyes: pupils equal, round, and reactive to light, extraocular eye movements intact, conjunctivae normal and sclera anicteric   Neck: neck supple and non tender without mass, no thyromegaly, no thyroid nodules and no cervical adenopathy   Pulmonary/Chest: Accessory muscle use plus. No wheezing. Bilateral rhonchi  Cardiovascular: normal rate, regular rhythm, normal S1 and S2, no murmurs, rubs, clicks or gallops, distal pulses intact, no carotid bruits, no murmurs, no gallops, no carotid bruits and no JVD   Abdomen: obese, soft, non-tender, non-distended, normal bowel sounds, no masses or organomegaly   Extremities: No edema or cyanosis  Musculoskeletal: normal range of motion, no joint swelling, deformity or tenderness   Neurologic: Sedated    Lab Data:  CBC:   Recent Labs     22  0559 22  0550   WBC 21.2* 19.7* 15.0*   RBC 3.38* 3.11* 3.03*   HGB 9.5* 8.7* 8.4*   HCT 29.4* 26.9* 26.7*   MCV 87.1 86.4 88.1   RDW 13.9 13.9 14.0    261 220     BMP:   Recent Labs     22  0310 22  0559 22  0549    139 139   K 5.0 4.2 3.8   CL 98* 101 105   CO2 28 26 18*   BUN 40* 42* 35*   CREATININE 1.6* 2.0* 1.8*     BNP: No results for input(s): BNP in the last 72 hours. PT/INR:   Recent Labs     22  1004   PROTIME 19.0*   INR 1.61*     APTT:No results for input(s):  APTT in the last 72 hours. CARDIAC ENZYMES:   Recent Labs     06/17/22 0310   TROPONINI <0.01     FASTING LIPID PANEL:  Lab Results   Component Value Date    CHOL 147 09/27/2021    HDL 49 09/27/2021    HDL 72 12/20/2011    TRIG 194 09/27/2021     LIVER PROFILE:   Recent Labs     06/17/22  0310 06/18/22  0559 06/19/22  0549   AST 15 11* 13*   ALT <5* <5* <5*   BILITOT <0.2 0.4 0.3   ALKPHOS 104 92 85         XR CHEST PORTABLE   Final Result   Supportive tubing projects in stable position. Partial regression of atelectasis in the right lung base. Persisting   consolidation or or atelectasis in the left lung base. XR CHEST PORTABLE   Final Result   1. Endotracheal tube terminates in appropriate position above the nirali. 2.  Enteric tube terminates in the body of the stomach. 3.  Basilar opacities again demonstrated, right greater than left. Interval   improved aeration of the left lung base. XR CHEST PORTABLE   Final Result   Increasing opacity in the right base, lobar atelectasis versus accumulating   right pleural effusion. Combination of both entities or underlying pneumonia   also possible. Persisting left basilar airspace disease and small pleural effusion. XR CHEST PORTABLE   Final Result   Hazy bilateral lobe airspace opacities and small left pleural effusion. This   could represent atelectasis or pneumonia in the appropriate clinical setting. CT CHEST WO CONTRAST   Final Result   1. Respiratory motion in the lower lungs. The right base has linear and   dependent atelectasis. The left does have atelectatic changes but could also   have some patchy opacities from pneumonia or aspiration at the left base. 2.  There is no pleural effusion or pneumothorax. 3.  Narrowing of the AP diameter of the trachea with bulging of the   membranous portion due to the gas distended but nondilated esophagus. XR CHEST PORTABLE   Final Result   1.   Bibasilar heterogeneous opacities may represent subsegmental atelectasis   and/or mild pulmonary edema. Underlying infection is difficult to exclude. Short-term follow-up PA and lateral chest radiographs may be helpful for   further evaluation. 2.  Cardiomegaly.          Fluoroscopy modified barium swallow with video    (Results Pending)   FL ESOPHAGRAM    (Results Pending)         Assessment & Plan:     Patient Active Problem List    Diagnosis Date Noted    Shortness of breath 06/17/2022    Aspiration pneumonia of left lower lobe (HCC)     Sensorineural hearing loss, bilateral 04/07/2022    Laceration of left lower extremity     Pulmonary nodules     C. difficile colitis 10/07/2021    Chest pain 09/27/2021    Pulmonary HTN ([address]     Mitral valve insufficiency     Sialadenitis     Colonic pseudoobstruction     Chronic kidney disease 03/01/2020    Partial symptomatic epilepsy with complex partial seizures, not intractable, without status epilepticus ([address] 45/73/5822    Diastolic congestive heart failure ([facility] 01/17/2019    Acute respiratory failure with hypoxia ([facility])     COPD (chronic obstructive pulmonary disease) ([facility] 12/10/2018    PAF (paroxysmal atrial fibrillation) ([facility])     Septicemia ([facility] 09/08/2018    Severe obstructive sleep apnea     Hepatic steatosis     Diverticulosis     Blind left eye     Parkinson disease ([address] 05/22/2018    CAD (coronary artery disease)     Morbid obesity with BMI of 40.0-44.9, adult ([address] 02/10/2017    Type 2 diabetes mellitus with stage 4 chronic kidney disease, without long-term current use of insulin ([facility] 02/10/2017    Moderate persistent asthma 08/09/2016    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-sided sciatica 08/09/2016    Hypothyroidism 01/27/2015    Postural dizziness 02/20/2013    Anemia of chronic disease     GERD (gastroesophageal reflux disease)     Essential hypertension     Hyperlipidemia     Restless leg syndrome     L carotid artery stenosis s/p CEA 01/01/2002            Acute Hypox Resp Failure. Aspiration PNA. Oxygen requirements increased rapidly from 2l/min to NRB to vapotherm  Currently 100% 40 L on Vapotherm in addition to a nonrebreather. Might have to be intubated. Check Resp Culture. Add 3% saline neb. Continue IV Zosyn and vancomycin. Intubated 6/18.      Sepsis POA - due to above.       Dysphagia. Abnormal CT  Cough with every meal intake per family report. Underlying Parkinson's. I ordered for MBS and esophagram, though these will unlikely be done till she is better clinically. Keep strict NPO at this time. IVF support.       CKD IIIB  Cr slight better then baseline actually. Clinically does not appear fluid overloaded. Gentle IVF while NPO      Parkinson's on Sinemet.       Afib Paroxysmal.   Rythmol   Hold Eliquis given bloody secretions during [facility].      HTN - BP on the low side. Nifedipine stopped due to this and also leg edema. Consider alternative when restarting.    Continue IV fluids      DVT Prophylaxis: eliquis held for bloody secretions seen during bronc  Diet: Diet NPO  Code Status: Full Code    Stewart Rosa MD

## 2022-06-19 NOTE — PROGRESS NOTES
Writer called DIT requesting placement of PICC line; spoke with [employer] rep Emelyn Treadwell. Shift Rn Allkyleigh aware of request placed. Lisa Antonio Clinical .

## 2022-06-19 NOTE — PROGRESS NOTES
Consent verified for bronchoscopy. Timeout per policy. Procedure performed by Dr. Kay Redding. Bronchoscopy assist performed on 100 FiO2.    0ml of 1% lidocaine instilled and 90ml of 0.9% normal saline instilled. Pt tolerated bronchoscopy without difficulty, airway support per RTD. Patient SpO2 within acceptable range throughout bronchoscopy procedure. Sedation provided/monitored per nurse. No s/s distress, no complications noted. See physician notes.   Continue plan of care

## 2022-06-19 NOTE — PROGRESS NOTES
Per Nephdian- Dr Zepeda Arkdale. [id number] for PICC line from [employer]. If emergent need. Clinical informed for call in of DIT at a later time.

## 2022-06-19 NOTE — PROCEDURES
BROCHOSCOPY PROCEDURE NOTE    DATE OF PROCEDURE: 6/19/2022    INDICATIONS & HISTORY:  Aspiration pneumonia    PREOPERATIVE DIAGNOSIS:    Aspiration pneumonia    POSTOPERATIVE DIAGNOSES:  Multiple mucus of blood and thick secretion bilateral      PROCEDURE PERFORMED:   1-Diagnotic, Fiberoptic Bronchoscopy  2-Bronchial alveolar lavage from right lower lobe               SURGEON:    Ronan Wyatt     ASSISTANT:   Bronchoscopy Nursing/Technical Team/OR Team    SEDATION:  propofol   Regional Anesthesia,       ANESTHESIA:    Lidocaine 2% ,       ANESTHESIOLOGIST:  Per EPIC Note    SPECIMENS:  [x] Bronchial sample(s) for      Fungal smear & culture,   Acid-fast bacillus Smear and Culture,    Gram stain, C&S,    PCP               Cytology           Description of Procedure: The patient  was in his [address]  and the procedure verified as Flexible Fiberoptic Bronchoscopy with BAL        After the patient was controlled with sedation bronchoscope was introduced through *ET tube* without difficulty. The scope was then passed into the trachea. Additional 2% lidocaine was used topically within the airway. Careful inspection of the tracheal lumen was accomplished. The scope was sequentially passed into all bronchial airways.            Endobronchial findings:     Trachea:  Normal mucosa, he the part seen outside the ET tube  Arabella  Normal mucosa     Right Main Stem Bronchus  Normal mucosa  Right Upper Lobe Bronchi Normal mucosa  Right Middle Lobe Bronchi  Normal mucosa  Right Lower Lobe Bronchi (including the Superior segment)   thick secretion with multiple mucous plug that was suction left Main Stem Bronchus Normal mucosa  Left Upper Lobe Bronchus, Upper Division Normal mucosa  Left Upper Lobe Bronchus, Lingula  Normal mucosa  Left Lower Lobe Bronchus (including the Superior segment)   thick secretion with multiple mucus of blood with some streak of blood can be seen with no active          BAL : Right lower lobe    COMPLICATIONS:  Estimated blood loss less than less than 1 cc CC    IMPRESSIONS:   Aspiration pneumonia      RECOMMENDATIONS:   The Patient was taken to the recovery area in satisfactory condition. Await final test/sample results.         Ella Dowell MD

## 2022-06-19 NOTE — PLAN OF CARE
Problem: Pain  Goal: Verbalizes/displays adequate comfort level or baseline comfort level  Outcome: Progressing     Problem: ABCDS Injury Assessment  Goal: Absence of physical injury  Outcome: Progressing     Problem: Skin/Tissue Integrity  Goal: Absence of new skin breakdown  Description: 1. Monitor for areas of redness and/or skin breakdown  2. Assess vascular access sites hourly  3. Every 4-6 hours minimum:  Change oxygen saturation probe site  4. Every 4-6 hours:  If on nasal continuous positive airway pressure, respiratory therapy assess nares and determine need for appliance change or resting period. Outcome: Progressing     Problem: Chronic Conditions and Co-morbidities  Goal: Patient's chronic conditions and co-morbidity symptoms are monitored and maintained or improved  Outcome: Progressing     Problem: Safety - Adult  Goal: Free from fall injury  Outcome: Progressing     Problem: Safety - Medical Restraint  Goal: Remains free of injury from restraints (Restraint for Interference with Medical Device)  Description: INTERVENTIONS:  1. Determine that other, less restrictive measures have been tried or would not be effective before applying the restraint  2. Evaluate the patient's condition at the time of restraint application  3. Inform patient/family regarding the reason for restraint  4.  Q2H: Monitor safety, psychosocial status, comfort, nutrition and hydration  Outcome: Progressing  Flowsheets  Taken 6/18/2022 2200 by Yudith Bellamy RN  Remains free of injury from restraints (restraint for interference with medical device):   Every 2 hours: Monitor safety, psychosocial status, comfort, nutrition and hydration   Evaluate the patient's condition at the time of restraint application   Determine that other, less restrictive measures have been tried or would not be effective before applying the restraint  Taken 6/18/2022 2000 by Yudith Bellamy RN  Remains free of injury from restraints (restraint for interference with medical device): Every 2 hours: Monitor safety, psychosocial status, comfort, nutrition and hydration  Taken 6/18/2022 1800 by Helen Doyle RN  Remains free of injury from restraints (restraint for interference with medical device): Every 2 hours: Monitor safety, psychosocial status, comfort, nutrition and hydration

## 2022-06-19 NOTE — PROGRESS NOTES
Shift assessment was completed (see flow sheet). Pt is Sedated. Pt currently on ventilator- 7.5 ETT, at [address] [facility],[address]. FiO2 at 80, PEEP 10, SpO2 at 97%, Respirations are even/equal,  with diminished/ rhonchi sounds. Infusing:  NS, Propfol, Fentanyl (See MAR). Oral Gastric tube clamped, 0mL residual observed and returned. IV access- Peripherals and WDL. Zimmerman New in place with STAT lock, Draining yellow/ Hazy urine. Scheduled medications to follow. Call light within reach. Bed in lowest position. Bed alarm on. Bilateral Wrist restraints in place and tied, for safety of lines and tubes. Family At bedside and updated. Will continue to monitor    Patient is not able to demonstrated the ability to move from a reclining position to an upright position within the recliner. Patient is confused, demented and /or unable to follow instruction. Consent obtained per Order for Bronchoscopy, and Central Line.

## 2022-06-19 NOTE — PROGRESS NOTES
FiO2 decraesed to 50%         06/19/22 0830   NICU Vent Information   Vt (Set, mL) 380 mL   Vt Exhaled 384 mL   Resp Rate (Set) 20 bmp   Rate Measured 20 br/min   Minute Volume 7.65 Liters   Peak Flow 50 L/min   Pressure Support 0 cmH20   FiO2  50 %   Peak Inspiratory Pressure 29 cmH2O   I:E Ratio 1:2.70   Sensitivity 3   High Peep/I Pressure 0   PEEP/CPAP (cmH2O) 10   I Time/ I Time % 0 s   Mean Airway Pressure 16 cmH20   Additional Respiratory Assessments   Heart Rate (!) 101   Resp 20   SpO2 98 %   Vent Alarm Settings   High Pressure  45 cmH2O   Low Minute Volume Alarm 2 L/min   RR High 40 br/min

## 2022-06-19 NOTE — PROGRESS NOTES
RT Nebulizer Bronchodilator Protocol Note    There is a bronchodilator order in the chart from a provider indicating to follow the RT Bronchodilator Protocol and there is an Initiate RT Bronchodilator Protocol order as well (see protocol at bottom of note). CXR Findings:  XR CHEST PORTABLE    Result Date: 6/19/2022  Supportive tubing projects in stable position. Partial regression of atelectasis in the right lung base. Persisting consolidation or or atelectasis in the left lung base. XR CHEST PORTABLE    Result Date: 6/18/2022  1. Endotracheal tube terminates in appropriate position above the nirali. 2.  Enteric tube terminates in the body of the stomach. 3.  Basilar opacities again demonstrated, right greater than left. Interval improved aeration of the left lung base. XR CHEST PORTABLE    Result Date: 6/18/2022  Increasing opacity in the right base, lobar atelectasis versus accumulating right pleural effusion. Combination of both entities or underlying pneumonia also possible. Persisting left basilar airspace disease and small pleural effusion. XR CHEST PORTABLE    Result Date: 6/17/2022  Hazy bilateral lobe airspace opacities and small left pleural effusion. This could represent atelectasis or pneumonia in the appropriate clinical setting. The findings from the last RT Protocol Assessment were as follows:  Smoking: (P) Chronic pulmonary disease  Respiratory Pattern: (P) Dyspnea on exertion or RR 21-25 bpm  Breath Sounds: (P) Intermittent or unilateral wheezes  Cough: (P) Weak, productive  Indication for Bronchodilator Therapy: (P) Decreased or absent breath sounds  Bronchodilator Assessment Score: (P) 10    Aerosolized bronchodilator medication orders have been revised according to the RT Nebulizer Bronchodilator Protocol below. Respiratory Therapist to perform RT Therapy Protocol Assessment initially then follow the protocol.   Repeat RT Therapy Protocol Assessment PRN for score 0-3 or on second treatment, BID, and PRN for scores above 3. No Indications - adjust the frequency to every 6 hours PRN wheezing or bronchospasm, if no treatments needed after 48 hours then discontinue using Per Protocol order mode. If indication present, adjust the RT bronchodilator orders based on the Bronchodilator Assessment Score as indicated below. If a patient is on this medication at home then do not decrease Frequency below that used at home. 0-3 - enter or revise RT bronchodilator order(s) to equivalent RT Bronchodilator order with Frequency of every 4 hours PRN for wheezing or increased work of breathing using Per Protocol order mode. 4-6 - enter or revise RT Bronchodilator order(s) to two equivalent RT bronchodilator orders with one order with BID Frequency and one order with Frequency of every 4 hours PRN wheezing or increased work of breathing using Per Protocol order mode. 7-10 - enter or revise RT Bronchodilator order(s) to two equivalent RT bronchodilator orders with one order with TID Frequency and one order with Frequency of every 4 hours PRN wheezing or increased work of breathing using Per Protocol order mode. 11-13 - enter or revise RT Bronchodilator order(s) to one equivalent RT bronchodilator order with QID Frequency and an Albuterol order with Frequency of every 4 hours PRN wheezing or increased work of breathing using Per Protocol order mode. Greater than 13 - enter or revise RT Bronchodilator order(s) to one equivalent RT bronchodilator order with every 4 hours Frequency and an Albuterol order with Frequency of every 2 hours PRN wheezing or increased work of breathing using Per Protocol order mode. RT to enter RT Home Evaluation for COPD & MDI Assessment order using Per Protocol order mode.     Electronically signed by Oscar Tee RCP on 6/19/2022 at 9:54 AM

## 2022-06-19 NOTE — PROGRESS NOTES
Pulmonary [facility]                             Pulmonary Consult/Progress Note :              Reason for Consultation: Critical care management  CC : Worsening shortness of breath and respiratory distress       HPI:   Developed worsening respiratory failure  Shortness of breath has get worse despite multiple attempts of suction and get a lot of secretions  Still need high O2   No fever or chills no chest pain  On mechanical ventilator    PHYSICAL EXAMINATION:     VITAL SIGNS:  BP (!) 89/40   Pulse 92   Temp 97.9 °F (36.6 °C) (Bladder)   Resp 20   Ht 5' 6\" (1.676 m)   Wt 270 lb (122.5 kg)   SpO2 95%   BMI 43.58 kg/m²   Wt Readings from Last 3 Encounters:   06/18/22 270 lb (122.5 kg)   05/24/22 264 lb (119.7 kg)   05/11/22 258 lb (117 kg)     Temp Readings from Last 3 Encounters:   06/19/22 97.9 °F (36.6 °C) (Bladder)   05/03/22 97.2 °F (36.2 °C)   04/21/22 99.4 °F (37.4 °C) (Oral)     TMAX:  BP Readings from Last 3 Encounters:   06/19/22 (!) 89/40   05/24/22 118/60   04/28/22 126/70     Pulse Readings from Last 3 Encounters:   06/19/22 92   05/24/22 64   04/28/22 68           INTAKE/OUTPUTS:  I/O last 3 completed shifts:   In: 3845.2 [I.V.:2908.4; IV Piggyback:936.8]  Out: 950 [Urine:950]    Intake/Output Summary (Last 24 hours) at 6/19/2022 0836  Last data filed at 6/19/2022 0806  Gross per 24 hour   Intake 3359.51 ml   Output 910 ml   Net 2449.51 ml       General Appearance: Intubated  With limited eyes: pupils equal, round, and reactive to light, extraocular eye movements intact, conjunctivae normal and sclera anicteric   Neck: neck supple and non tender without mass, no thyromegaly, no thyroid nodules and no cervical adenopathy   Pulmonary/Chest: Rhonchi bilaterally  Cardiovascular: normal rate, regular rhythm, normal S1 and S2, no murmurs, rubs, clicks or gallops, distal pulses intact, no carotid bruits, no murmurs, no gallops, no carotid bruits and no JVD (gastroesophageal reflux disease)    Essential hypertension    Hyperlipidemia    Restless leg syndrome    Anemia of chronic disease    Postural dizziness    Moderate persistent asthma    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-sided sciatica    Morbid obesity with BMI of 40.0-44.9, adult ([facility])    Type 2 diabetes mellitus with stage 4 chronic kidney disease, without long-term current use of insulin (HCC)    CAD (coronary artery disease)    Parkinson disease (HCC)    Severe obstructive sleep apnea    Hypothyroidism    Hepatic steatosis    Diverticulosis    Blind left eye    L carotid artery stenosis s/p CEA    Septicemia (HCC)    PAF (paroxysmal atrial fibrillation) (HCC)    COPD (chronic obstructive pulmonary disease) (HCC)    Acute respiratory failure with hypoxia (HCC)    Diastolic congestive heart failure (HCC)    Partial symptomatic epilepsy with complex partial seizures, not intractable, without status epilepticus ([facility])    Chronic kidney disease    Colonic pseudoobstruction    Sialadenitis    Chest pain    Pulmonary HTN ([address]    Mitral valve insufficiency    C. difficile colitis    Laceration of left lower extremity    Pulmonary nodules    Sensorineural hearing loss, bilateral    Shortness of breath    Aspiration pneumonia of left lower lobe ([facility])               ASSESSMENT:  1.)  Acute hypoxemic respiratory failure secondary to aspiration pneumonia  2.)  History of Parkinson disease  3.)  Obstructive sleep apnea   4.)  History of A. fib  5.)  History of C. difficile in the past  6-chronic kidney disease creatinine seems 1.6 at baseline  7_Possible congestive heart failure we will review previous cardiac study      PLAN:    S/p intubation for acute respiratory failure s/p intubation due to as she was intubated in 618  status post bronchoscopy with removal of a lot of mucus of blood e may try noninvasive mechanical       Follow [facility] from [facility]  Cover with Vanco and Zosyn, nasal MRSA positive  *-Patient clearly with her history of Parkinson at high risk for aspiration at this time she will need full evaluation with swallow study I will hold on barium swallow study at this time to avoid aspiration  Her chest x-ray showcollapse lobe lower lobes     *-I would recommended aggressive pulmonary toilet, Mucinex, hypertonic saline, incentive spirometry along with flutter valve  *-Continue with here DVT prophylaxis she is on Eliquis if she cannot take orally we will change to Lovenox subcu therapeutic  *-We will try to change all other medication to IV ,if not ok for parkinson med's   *_We will see how she is doing with next 24 hours n.p.o. we will keep checking her sugar and hold insulin at this time since she will not be eating  Ok to try oral med's  Swallow study on Monday  Discuss with RT about aggressive pulmonary toilet   We will keep in the ICU for observation    Care reviewed with nursing staff, medical and surgical specialty care, primary care and the patient's family as available. Chart review/lab review/X-ray viewing/documentation and had long Conversation with patient/family re: prognosis, care options and any end of life issues:      Critical care time spent reviewing labs/films, examining patient, collaborating with other physicians more than 35  Minutes  excluding procedures . Erasto Pedroza M.D.   6/19/2022  8:36 AM      There is no family around to discuss however we will try to reach        Thank you very much for allowing me to participate in the care of this pleasant patient , should you have any questions ,please do not hesitate to contact me      Mandy Smith  Pulmonary&Critical Care Medicine   Director of [address] [place] [address] Director of [address] [place] [address]   Professor Claudean Margarita    NOTE: This report was transcribed using voice recognition software.  Every effort was made to ensure accuracy; however, inadvertent

## 2022-06-19 NOTE — PROGRESS NOTES
Shift handoff report given to Alejandra/ Alvera Proper RN at bedside. Pt is sedated on vent  IV handoff completed. Call light within reach, bed in lowest position, bed alarm on. End of shift checks completed. Pt has been free of falls for duration of shift. Matt Henderson

## 2022-06-19 NOTE — PROGRESS NOTES
4 Eyes Skin Assessment     The patient is being assess for   Shift Handoff    I agree that 2 RN's have performed a thorough Head to Toe Skin Assessment on the patient. ALL assessment sites listed below have been assessed. Areas assessed for pressure by both nurses:   [x]   Head, Face, and Ears   [x]   Shoulders, Back, and Chest, Abdomen  [x]   Arms, Elbows, and Hands   [x]   Coccyx, Sacrum, and Ischium  [x]   Legs, Feet, and Heels        Skin Assessed Under all Medical Devices by both nurses:  ETT, OG and heel boots              All Mepilex Borders were peeled back and area peeked at by both nurses:  Yes  Please list where Mepilex Borders are located:  sacrum             **SHARE this note so that the co-signing nurse is able to place an eSignature**    Co-signer eSignature: Electronically signed by Odilia Lombard, RN on 6/19/22 at 01:19 AM EDT    Does the Patient have Skin Breakdown related to pressure?   Yes [facility] WOUND CARE was Initiated documentation include the Annmarie-wound, Wound Assessment, Measurements, Dressing Treatment, Drainage, and Color\",              Vineet Prevention initiated:  Yes   Wound Care Orders initiated:  Yes      [id number]  nurse consulted for Pressure Injury (Stage 3,4, Unstageable, DTI, NWPT, Complex wounds)and New or Established Ostomies:  Yes      Primary Nurse eSignature: Electronically signed by Lisandro Lipscomb RN on 6/18/22 at 11:15 PM EDT

## 2022-06-19 NOTE — PROGRESS NOTES
06/19/22 0734   NICU Vent Information   Vt (Set, mL) 380 mL   Vt Exhaled 385 mL   Resp Rate (Set) 20 bmp   Rate Measured 19 br/min   Minute Volume 7.5 Liters   Peak Flow 50 L/min   Pressure Support 0 cmH20   FiO2  60 %   Peak Inspiratory Pressure 30 cmH2O   I:E Ratio 1:1.40   Sensitivity 3   High Peep/I Pressure 0   PEEP/CPAP (cmH2O) 10   I Time/ I Time % 0 s   Mean Airway Pressure 16 cmH20   Plateau Pressure 27 XCH90   Static Compliance 23 mL/cmH2O   Cough/Sputum   Sputum How Obtained Suctioned;Endotracheal   Cough Productive   Sputum Amount Large   Sputum Color Dark Yellow   Tenacity Thick   Breath Sounds   Right Upper Lobe Clear   Right Middle Lobe Diminished;Rhonchi   Right Lower Lobe Diminished;Rhonchi   Left Upper Lobe Clear   Left Lower Lobe Diminished   Additional Respiratory Assessments   Heart Rate 90   Resp 20   SpO2 91 %   Position Semi-Mccollum's   Humidification Source Heated wire   Cuff Pressure (cm H2O) 26 cm H2O   Vent Alarm Settings   High Pressure  45 cmH2O   Low Minute Volume Alarm 2 L/min   RR High 40 br/min   Apnea (Secs) 20 secs   Ambu Bag With Mask At Bedside Yes   NICU Ventilator Associated Pneumonia Bundle   Elevation of Head of Bed Yes   Oral Care Completed Yes   Oral Suctioning Yes   ETT/Trach Suctioning Yes   ETT (adult)   Placement Date/Time: 06/18/22 1650   Present on Admission/Arrival: No  Placement Verified By: Direct visualization;Colorimetric ETCO2 device  Preoxygenation: Yes  Airway Tube Size: 8 mm  Laryngoscope: GlideScope  Blade Size: 3  Location: Oral  Secured. ..    Secured At 23 cm   Measured From Lips   ETT Placement Center   Secured By Commercial tube hughes   Site Assessment Dry

## 2022-06-20 ENCOUNTER — TELEPHONE (OUTPATIENT)
Dept: PULMONOLOGY | Age: 82
End: 2022-06-20

## 2022-06-20 ENCOUNTER — APPOINTMENT (OUTPATIENT)
Dept: INTERVENTIONAL RADIOLOGY/VASCULAR | Age: 82
DRG: 004 | End: 2022-06-20
Payer: MEDICARE

## 2022-06-20 DIAGNOSIS — R91.1 PULMONARY NODULE: Primary | ICD-10-CM

## 2022-06-20 LAB
ANION GAP SERPL CALCULATED.3IONS-SCNC: 13 MMOL/L (ref 3–16)
BASE EXCESS ARTERIAL: -3.4 MMOL/L (ref -3–3)
BASOPHILS ABSOLUTE: 0 K/UL (ref 0–0.2)
BASOPHILS RELATIVE PERCENT: 0.4 %
BUN BLDV-MCNC: 31 MG/DL (ref 7–20)
CALCIUM SERPL-MCNC: 6.8 MG/DL (ref 8.3–10.6)
CARBOXYHEMOGLOBIN ARTERIAL: 0.1 % (ref 0–1.5)
CHLORIDE BLD-SCNC: 101 MMOL/L (ref 99–110)
CO2: 20 MMOL/L (ref 21–32)
CREAT SERPL-MCNC: 1.8 MG/DL (ref 0.6–1.2)
CULTURE, RESPIRATORY: NORMAL
EOSINOPHILS ABSOLUTE: 0.5 K/UL (ref 0–0.6)
EOSINOPHILS RELATIVE PERCENT: 3.6 %
GFR AFRICAN AMERICAN: 33
GFR NON-AFRICAN AMERICAN: 27
GLUCOSE BLD-MCNC: 100 MG/DL (ref 70–99)
GLUCOSE BLD-MCNC: 103 MG/DL (ref 70–99)
GLUCOSE BLD-MCNC: 111 MG/DL (ref 70–99)
GLUCOSE BLD-MCNC: 114 MG/DL (ref 70–99)
GLUCOSE BLD-MCNC: 116 MG/DL (ref 70–99)
GLUCOSE BLD-MCNC: 124 MG/DL (ref 70–99)
GLUCOSE BLD-MCNC: 129 MG/DL (ref 70–99)
GRAM STAIN RESULT: NORMAL
HCO3 ARTERIAL: 21.8 MMOL/L (ref 21–29)
HCT VFR BLD CALC: 22.3 % (ref 36–48)
HEMOGLOBIN, ART, EXTENDED: 8.6 G/DL (ref 12–16)
HEMOGLOBIN: 8 G/DL (ref 12–16)
LYMPHOCYTES ABSOLUTE: 1.1 K/UL (ref 1–5.1)
LYMPHOCYTES RELATIVE PERCENT: 8.5 %
MCH RBC QN AUTO: 30.8 PG (ref 26–34)
MCHC RBC AUTO-ENTMCNC: 35.6 G/DL (ref 31–36)
MCV RBC AUTO: 86.6 FL (ref 80–100)
METHEMOGLOBIN ARTERIAL: 0.1 %
MONOCYTES ABSOLUTE: 0.8 K/UL (ref 0–1.3)
MONOCYTES RELATIVE PERCENT: 6.3 %
NEUTROPHILS ABSOLUTE: 10.8 K/UL (ref 1.7–7.7)
NEUTROPHILS RELATIVE PERCENT: 81.2 %
O2 SAT, ARTERIAL: 95.1 %
O2 THERAPY: ABNORMAL
PCO2 ARTERIAL: 39.8 MMHG (ref 35–45)
PDW BLD-RTO: 14 % (ref 12.4–15.4)
PERFORMED ON: ABNORMAL
PH ARTERIAL: 7.36 (ref 7.35–7.45)
PLATELET # BLD: 235 K/UL (ref 135–450)
PMV BLD AUTO: 8.2 FL (ref 5–10.5)
PO2 ARTERIAL: 78.1 MMHG (ref 75–108)
POTASSIUM SERPL-SCNC: 3.7 MMOL/L (ref 3.5–5.1)
PRO-BNP: 713 PG/ML (ref 0–449)
RBC # BLD: 2.58 M/UL (ref 4–5.2)
SODIUM BLD-SCNC: 134 MMOL/L (ref 136–145)
TCO2 ARTERIAL: 23.1 MMOL/L
VANCOMYCIN RANDOM: 13.6 UG/ML
WBC # BLD: 13.3 K/UL (ref 4–11)

## 2022-06-20 PROCEDURE — 6360000002 HC RX W HCPCS: Performed by: INTERNAL MEDICINE

## 2022-06-20 PROCEDURE — 99233 SBSQ HOSP IP/OBS HIGH 50: CPT | Performed by: INTERNAL MEDICINE

## 2022-06-20 PROCEDURE — 6370000000 HC RX 637 (ALT 250 FOR IP): Performed by: INTERNAL MEDICINE

## 2022-06-20 PROCEDURE — C1751 CATH, INF, PER/CENT/MIDLINE: HCPCS

## 2022-06-20 PROCEDURE — 2580000003 HC RX 258: Performed by: INTERNAL MEDICINE

## 2022-06-20 PROCEDURE — 05HY33Z INSERTION OF INFUSION DEVICE INTO UPPER VEIN, PERCUTANEOUS APPROACH: ICD-10-PCS | Performed by: INTERNAL MEDICINE

## 2022-06-20 PROCEDURE — 36573 INSJ PICC RS&I 5 YR+: CPT

## 2022-06-20 PROCEDURE — 2700000000 HC OXYGEN THERAPY PER DAY

## 2022-06-20 PROCEDURE — 2000000000 HC ICU R&B

## 2022-06-20 PROCEDURE — 2500000003 HC RX 250 WO HCPCS: Performed by: INTERNAL MEDICINE

## 2022-06-20 PROCEDURE — 80048 BASIC METABOLIC PNL TOTAL CA: CPT

## 2022-06-20 PROCEDURE — 83880 ASSAY OF NATRIURETIC PEPTIDE: CPT

## 2022-06-20 PROCEDURE — 85025 COMPLETE CBC W/AUTO DIFF WBC: CPT

## 2022-06-20 PROCEDURE — 36600 WITHDRAWAL OF ARTERIAL BLOOD: CPT

## 2022-06-20 PROCEDURE — A4216 STERILE WATER/SALINE, 10 ML: HCPCS | Performed by: INTERNAL MEDICINE

## 2022-06-20 PROCEDURE — 80202 ASSAY OF VANCOMYCIN: CPT

## 2022-06-20 PROCEDURE — 99291 CRITICAL CARE FIRST HOUR: CPT | Performed by: INTERNAL MEDICINE

## 2022-06-20 PROCEDURE — 94003 VENT MGMT INPAT SUBQ DAY: CPT

## 2022-06-20 PROCEDURE — 82803 BLOOD GASES ANY COMBINATION: CPT

## 2022-06-20 PROCEDURE — 36592 COLLECT BLOOD FROM PICC: CPT

## 2022-06-20 PROCEDURE — 94640 AIRWAY INHALATION TREATMENT: CPT

## 2022-06-20 PROCEDURE — 36415 COLL VENOUS BLD VENIPUNCTURE: CPT

## 2022-06-20 PROCEDURE — 94761 N-INVAS EAR/PLS OXIMETRY MLT: CPT

## 2022-06-20 RX ORDER — FENTANYL CITRATE-0.9 % NACL/PF 10 MCG/ML
25-200 PLASTIC BAG, INJECTION (ML) INTRAVENOUS CONTINUOUS
Status: DISCONTINUED | OUTPATIENT
Start: 2022-06-20 | End: 2022-06-23

## 2022-06-20 RX ORDER — CARBOXYMETHYLCELLULOSE SODIUM 10 MG/ML
1 GEL OPHTHALMIC 4 TIMES DAILY PRN
Status: DISCONTINUED | OUTPATIENT
Start: 2022-06-20 | End: 2022-07-19 | Stop reason: HOSPADM

## 2022-06-20 RX ORDER — CETIRIZINE HYDROCHLORIDE 10 MG/1
TABLET ORAL
Qty: 45 TABLET | Refills: 1 | Status: SHIPPED | OUTPATIENT
Start: 2022-06-20

## 2022-06-20 RX ORDER — FENTANYL CITRATE-0.9 % NACL/PF 20 MCG/2ML
50 SYRINGE (ML) INTRAVENOUS EVERY 30 MIN PRN
Status: DISCONTINUED | OUTPATIENT
Start: 2022-06-20 | End: 2022-06-23

## 2022-06-20 RX ADMIN — SODIUM CHLORIDE, PRESERVATIVE FREE 10 ML: 5 INJECTION INTRAVENOUS at 22:42

## 2022-06-20 RX ADMIN — LAMOTRIGINE 50 MG: 25 TABLET ORAL at 20:33

## 2022-06-20 RX ADMIN — FAMOTIDINE 20 MG: 10 INJECTION INTRAVENOUS at 11:00

## 2022-06-20 RX ADMIN — Medication 2 PUFF: at 19:14

## 2022-06-20 RX ADMIN — PROPOFOL 20 MCG/KG/MIN: 10 INJECTION, EMULSION INTRAVENOUS at 15:31

## 2022-06-20 RX ADMIN — IPRATROPIUM BROMIDE AND ALBUTEROL SULFATE 1 AMPULE: 2.5; .5 SOLUTION RESPIRATORY (INHALATION) at 02:51

## 2022-06-20 RX ADMIN — SODIUM CHLORIDE: 9 INJECTION, SOLUTION INTRAVENOUS at 07:13

## 2022-06-20 RX ADMIN — Medication 50 MCG/HR: at 22:41

## 2022-06-20 RX ADMIN — PROPOFOL 20 MCG/KG/MIN: 10 INJECTION, EMULSION INTRAVENOUS at 10:56

## 2022-06-20 RX ADMIN — CARBIDOPA AND LEVODOPA 1 TABLET: 25; 100 TABLET ORAL at 13:34

## 2022-06-20 RX ADMIN — CEFEPIME 2000 MG: 2 INJECTION, POWDER, FOR SOLUTION INTRAVENOUS at 12:47

## 2022-06-20 RX ADMIN — CEFEPIME 2000 MG: 2 INJECTION, POWDER, FOR SOLUTION INTRAVENOUS at 22:25

## 2022-06-20 RX ADMIN — MUPIROCIN: 20 OINTMENT TOPICAL at 20:33

## 2022-06-20 RX ADMIN — Medication 15 ML: at 09:12

## 2022-06-20 RX ADMIN — DULOXETINE HYDROCHLORIDE 60 MG: 60 CAPSULE, DELAYED RELEASE ORAL at 09:12

## 2022-06-20 RX ADMIN — PROPOFOL 30 MCG/KG/MIN: 10 INJECTION, EMULSION INTRAVENOUS at 05:38

## 2022-06-20 RX ADMIN — CARBIDOPA AND LEVODOPA 1 TABLET: 25; 100 TABLET ORAL at 20:31

## 2022-06-20 RX ADMIN — ATORVASTATIN CALCIUM 40 MG: 40 TABLET, FILM COATED ORAL at 20:31

## 2022-06-20 RX ADMIN — PROPOFOL 30 MCG/KG/MIN: 10 INJECTION, EMULSION INTRAVENOUS at 00:44

## 2022-06-20 RX ADMIN — IPRATROPIUM BROMIDE AND ALBUTEROL SULFATE 1 AMPULE: 2.5; .5 SOLUTION RESPIRATORY (INHALATION) at 15:20

## 2022-06-20 RX ADMIN — Medication 75 MCG/HR: at 00:41

## 2022-06-20 RX ADMIN — PROPAFENONE HYDROCHLORIDE 150 MG: 150 TABLET, FILM COATED ORAL at 13:34

## 2022-06-20 RX ADMIN — CARBIDOPA AND LEVODOPA 1 TABLET: 25; 100 TABLET ORAL at 15:21

## 2022-06-20 RX ADMIN — IPRATROPIUM BROMIDE AND ALBUTEROL SULFATE 1 AMPULE: 2.5; .5 SOLUTION RESPIRATORY (INHALATION) at 22:47

## 2022-06-20 RX ADMIN — CARBIDOPA AND LEVODOPA 1 TABLET: 25; 100 TABLET ORAL at 09:12

## 2022-06-20 RX ADMIN — PROPAFENONE HYDROCHLORIDE 150 MG: 150 TABLET, FILM COATED ORAL at 06:15

## 2022-06-20 RX ADMIN — PROPAFENONE HYDROCHLORIDE 150 MG: 150 TABLET, FILM COATED ORAL at 22:30

## 2022-06-20 RX ADMIN — PROPOFOL 20 MCG/KG/MIN: 10 INJECTION, EMULSION INTRAVENOUS at 22:23

## 2022-06-20 RX ADMIN — IPRATROPIUM BROMIDE AND ALBUTEROL SULFATE 1 AMPULE: 2.5; .5 SOLUTION RESPIRATORY (INHALATION) at 19:14

## 2022-06-20 RX ADMIN — SODIUM CHLORIDE, PRESERVATIVE FREE 10 ML: 5 INJECTION INTRAVENOUS at 09:16

## 2022-06-20 RX ADMIN — IPRATROPIUM BROMIDE AND ALBUTEROL SULFATE 1 AMPULE: 2.5; .5 SOLUTION RESPIRATORY (INHALATION) at 08:08

## 2022-06-20 RX ADMIN — LAMOTRIGINE 50 MG: 25 TABLET ORAL at 09:15

## 2022-06-20 RX ADMIN — VANCOMYCIN HYDROCHLORIDE 1000 MG: 1 INJECTION, POWDER, LYOPHILIZED, FOR SOLUTION INTRAVENOUS at 10:57

## 2022-06-20 RX ADMIN — PIPERACILLIN AND TAZOBACTAM 3375 MG: 3; .375 INJECTION, POWDER, LYOPHILIZED, FOR SOLUTION INTRAVENOUS at 03:24

## 2022-06-20 RX ADMIN — Medication 15 ML: at 20:30

## 2022-06-20 RX ADMIN — MONTELUKAST SODIUM 10 MG: 10 TABLET, COATED ORAL at 09:12

## 2022-06-20 RX ADMIN — IPRATROPIUM BROMIDE AND ALBUTEROL SULFATE 1 AMPULE: 2.5; .5 SOLUTION RESPIRATORY (INHALATION) at 11:49

## 2022-06-20 RX ADMIN — MUPIROCIN: 20 OINTMENT TOPICAL at 09:15

## 2022-06-20 ASSESSMENT — PULMONARY FUNCTION TESTS
PIF_VALUE: 15
PIF_VALUE: 24
PIF_VALUE: 24
PIF_VALUE: 23
PIF_VALUE: 28
PIF_VALUE: 24
PIF_VALUE: 23
PIF_VALUE: 24
PIF_VALUE: 25
PIF_VALUE: 25
PIF_VALUE: 23
PIF_VALUE: 23
PIF_VALUE: 24
PIF_VALUE: 25
PIF_VALUE: 24
PIF_VALUE: 23
PIF_VALUE: 25
PIF_VALUE: 26
PIF_VALUE: 25
PIF_VALUE: 23
PIF_VALUE: 16
PIF_VALUE: 24
PIF_VALUE: 25

## 2022-06-20 NOTE — PROGRESS NOTES
Comprehensive Nutrition Assessment    Type and Reason for Visit:  Initial,Consult (consult for TF ordering and management)    Nutrition Recommendations/Plan:   1. Start TF - ADULT TUBE FEEDING; Orogastric; Peptide-Based High-Protein formula - Vital High-Protein with a goal rate of 35 ml/hr x 20 hours. Start with 20 ml/hr and increase by 15 ml every 4 hours, as tolerated by patient, until goal rate can be achieved and maintained. Water flushes, normal saline, 30 ml every 6 hours, per Dr. Montse Crowe. Please administer one proteinex P2Go once daily via feeding tube. 2. Monitor TF start, rate, intake, and tolerance + water flushes + administration of one proteinex P2Go once daily. 3. Monitor vent status, sedation type/amount (propofol is currently infusing at 30 mcg x 24 hours which = 584 kcals from lipids), TG check, and plan of care. 4. Please obtain an updated weight for this patient - last weight was obtained on 6/18/22.   5. Monitor nutrition-related labs, bowel function, and weight trends. Malnutrition Assessment:  Malnutrition Status:   At risk for malnutrition (06/20/22 1256)    Context:  Acute Illness     Findings of the 6 clinical characteristics of malnutrition:  Energy Intake:  Unable to assess  Weight Loss:  No significant weight loss     Body Fat Loss:  No significant body fat loss     Muscle Mass Loss:  No significant muscle mass loss    Fluid Accumulation:  No significant fluid accumulation     Strength:  Not Performed    Nutrition Assessment:    patient is nutritionally compromised AEB worsening respiratory dysfunction and altered nutrition-related labs and she is at risk for further compromise d/t need for intubation, need for EN as sole source of nutrition while intubated, and altered nutrition-related labs; will start TF today    Nutrition Related Findings:    patient is intubated and sedated with 30 mcg propofol at this time; + SBT this am but she did not pass SBT; abdomen is round and bowel sounds are active; no documented BM for this admission; hx of Parkinson's disease; + blind and [place]; IVF d/c'd today since TF will be started; SLP evaluated patient on 6/18/22 and recommended NPO status and MBSS Wound Type: Stage II,Pressure Injury (stage 2 pressure injury on R buttocks)       Current Nutrition Intake & Therapies:    Average Meal Intake: NPO  Average Supplements Intake: NPO  Current Tube Feeding (TF) Orders:  · Feeding Route:    · Formula: Peptide Based High Protein  · Schedule: Continuous  · Feeding Regimen: Vital High-Protein with a goal rate of 35 ml/hr x 20 hours  · Additives/Modulars: Protein (one proteinex P2Go once daily)  · Water Flushes: per Dr. Francesco Anderson, normal saline water flushes of 30 ml every 6 hours  · Current TF & Flush Orders Provides: TF will be started today  · Goal TF & Flush Orders Provides: Vital High-Protein with a goal rate of 35 ml/hr x 20 hours = 700 ml TV, 700 kcals, 61 g protein, and 585 ml free water + 26 g protein and 104 kcals from one proteinex P2Go once daily (87 g protein and 804 kcals total) + 30 ml normal saline water flushes every 6 hours, per Dr. Francesco Anderson      Anthropometric Measures:  Height: 5' 6\" (167.6 cm)  Ideal Body Weight (IBW): 130 lbs (59 kg)    Admission Body Weight: 270 lb (122.5 kg) (obtained on 6/18/22; actual weight)  Current Body Weight: 270 lb (122.5 kg) (obtained on 6/18/22; actual weight), 207.7 % IBW.  Weight Source: Bed Scale  Current BMI (kg/m2): 43.6  Usual Body Weight: 273 lb 5 oz (124 kg) (obtained on 9/30/21; actual weight)  % Weight Change (Calculated): -1.2  Weight Adjustment For: No Adjustment                 BMI Categories: Obese Class 3 (BMI 40.0 or greater) (43.58)    Estimated Daily Nutrient Needs:  Energy Requirements Based On: Kcal/kg  Weight Used for Energy Requirements: Current  Energy (kcal/day): 984 - 1353 kcals based on 8-11 kcals/kg/CBW  Weight Used for Protein Requirements: Ideal  Protein (g/day): 148 - 159 g protein based on 2.5-2.7 g/kg/IBW  Method Used for Fluid Requirements: 1 ml/kcal  Fluid (ml/day): 984 - 1353 ml    Nutrition Diagnosis:   · Inadequate oral intake related to inadequate protein-energy intake,impaired respiratory function as evidenced by NPO or clear liquid status due to medical condition,intubation,nutrition support - enteral nutrition,lab values,swallow study results      Nutrition Interventions:   Food and/or Nutrient Delivery: Continue NPO,Start Tube Feeding  Nutrition Education/Counseling: No recommendation at this time  Coordination of Nutrition Care: Continue to monitor while inpatient,Interdisciplinary Rounds  Plan of Care discussed with: ICU Team    Goals:  Previous Goal Met:  (N/A)  Goals: Initiate nutrition support       Nutrition Monitoring and Evaluation:   Behavioral-Environmental Outcomes: None Identified  Food/Nutrient Intake Outcomes: Enteral Nutrition Intake/Tolerance  Physical Signs/Symptoms Outcomes: Biochemical Data,Hemodynamic Status,Nutrition Focused Physical Findings,Skin,Weight    Discharge Planning:     Too soon to determine     Timbo Koehler N [address] [place] [address],   Contact: [phone]

## 2022-06-20 NOTE — PLAN OF CARE
Problem: Discharge Planning  Goal: Discharge to home or other facility with appropriate resources  6/19/2022 2035 by Beronica Hernandez RN  Outcome: Progressing  6/19/2022 2035 by Beronica Hernandez RN  Outcome: Progressing     Problem: Pain  Goal: Verbalizes/displays adequate comfort level or baseline comfort level  6/19/2022 2035 by Beronica Hernandez RN  Outcome: Progressing  6/19/2022 2035 by Beronica Hernandez RN  Outcome: Progressing     Problem: ABCDS Injury Assessment  Goal: Absence of physical injury  6/19/2022 2035 by Beronica Hernandez RN  Outcome: Progressing  6/19/2022 2035 by Beronica Hernandez RN  Outcome: Progressing     Problem: Skin/Tissue Integrity  Goal: Absence of new skin breakdown  Description: 1. Monitor for areas of redness and/or skin breakdown  2. Assess vascular access sites hourly  3. Every 4-6 hours minimum:  Change oxygen saturation probe site  4. Every 4-6 hours:  If on nasal continuous positive airway pressure, respiratory therapy assess nares and determine need for appliance change or resting period.   6/19/2022 2035 by Beronica Hernandez RN  Outcome: Progressing  6/19/2022 2035 by Beronica Hernandez RN  Turning q2  Problem: Chronic Conditions and Co-morbidities  Goal: Patient's chronic conditions and co-morbidity symptoms are monitored and maintained or improved  6/19/2022 2035 by Beronica Hernandez RN  Outcome: Progressing  6/19/2022 2035 by Beronica Hernandez RN  Outcome: Progressing     Problem: Safety - Adult  Goal: Free from fall injury  6/19/2022 2035 by Beronica Hernandez RN  Outcome: Progressing  6/19/2022 2035 by Beronica Hernandez RN  Outcome: Progressing     Outcome: Progressing     Problem: Chronic Conditions and Co-morbidities  Goal: Patient's chronic conditions and co-morbidity symptoms are monitored and maintained or improved  6/19/2022 2035 by Beronica Hernandez RN  Outcome: Progressing  6/19/2022 2035 by Beronica Hernandez RN  Outcome: Progressing

## 2022-06-20 NOTE — LETTER
Alejandra Aquino MD        September 6, 2022    [place] 44      Dear [facility] Every:    I am writing because my staff has left multiple messages asking that you call the office to schedule your office visit, but to date they have not heard from you. We care about you and the management of your healthcare and want to make sure that you follow up as recommended. As your Pulmonologist I must stress to you how important it is for you to have the tests I order as well to see me in follow up. The tests are necessary so that I can monitor your pulmonary nodule for any changes that could be cancer. I have to also mention, that in an effort to provide quality care and timely appointments to all my patients, it is our policy that patients be discharged from the practice if they do not show for three scheduled appointments. You would be informed of this termination by mail, and would have 30 days from the date of discharge to locate another physician. Please call the office to let us know your plans for treatment and to reschedule your appointment.      Sincerely,        Alejandra Aquino MD

## 2022-06-20 NOTE — PROGRESS NOTES
RT Inhaler-Nebulizer Bronchodilator Protocol Note    There is a bronchodilator order in the chart from a provider indicating to follow the RT Bronchodilator Protocol and there is an Initiate RT Inhaler-Nebulizer Bronchodilator Protocol order as well (see protocol at bottom of note). CXR Findings:  XR CHEST PORTABLE    Result Date: 6/19/2022  Supportive tubing projects in stable position. Partial regression of atelectasis in the right lung base. Persisting consolidation or or atelectasis in the left lung base. The findings from the last RT Protocol Assessment were as follows:   History Pulmonary Disease: (P) Chronic pulmonary disease  Respiratory Pattern: (P) Dyspnea on exertion or RR 21-25 bpm  Breath Sounds: (P) Slightly diminished and/or crackles  Cough: (P) Weak, productive  Indication for Bronchodilator Therapy: Decreased or absent breath sounds  Bronchodilator Assessment Score: (P) 8    Aerosolized bronchodilator medication orders have been revised according to the RT Inhaler-Nebulizer Bronchodilator Protocol below. Respiratory Therapist to perform RT Therapy Protocol Assessment initially then follow the protocol. Repeat RT Therapy Protocol Assessment PRN for score 0-3 or on second treatment, BID, and PRN for scores above 3. No Indications - adjust the frequency to every 6 hours PRN wheezing or bronchospasm, if no treatments needed after 48 hours then discontinue using Per Protocol order mode. If indication present, adjust the RT bronchodilator orders based on the Bronchodilator Assessment Score as indicated below. Use Inhaler orders unless patient has one or more of the following: on home nebulizer, not able to hold breath for 10 seconds, is not alert and oriented, cannot activate and use MDI correctly, or respiratory rate 25 breaths per minute or more, then use the equivalent nebulizer order(s) with same Frequency and PRN reasons based on the score.   If a patient is on this medication at home then do not decrease Frequency below that used at home. 0-3 - enter or revise RT bronchodilator order(s) to equivalent RT Bronchodilator order with Frequency of every 4 hours PRN for wheezing or increased work of breathing using Per Protocol order mode. 4-6 - enter or revise RT Bronchodilator order(s) to two equivalent RT bronchodilator orders with one order with BID Frequency and one order with Frequency of every 4 hours PRN wheezing or increased work of breathing using Per Protocol order mode. 7-10 - enter or revise RT Bronchodilator order(s) to two equivalent RT bronchodilator orders with one order with TID Frequency and one order with Frequency of every 4 hours PRN wheezing or increased work of breathing using Per Protocol order mode. 11-13 - enter or revise RT Bronchodilator order(s) to one equivalent RT bronchodilator order with QID Frequency and an Albuterol order with Frequency of every 4 hours PRN wheezing or increased work of breathing using Per Protocol order mode. Greater than 13 - enter or revise RT Bronchodilator order(s) to one equivalent RT bronchodilator order with every 4 hours Frequency and an Albuterol order with Frequency of every 2 hours PRN wheezing or increased work of breathing using Per Protocol order mode.          Electronically signed by Roula Roche RCP on 6/20/2022 at 7:37 PM no

## 2022-06-20 NOTE — PROGRESS NOTES
ABG drawn x 1 attempt(s) from right radial artery. Patient had positive modified Keven's Test.  Patient was on 30% (LPM/Fio2) oxygen per vent (device). Pressure held x 10 minutes. No bleeding or bruising noted at puncture site. Patient tolerated procedure well.

## 2022-06-20 NOTE — PROGRESS NOTES
The patients rings were retrieved from the safe and returned to the patients  Lesli Contreras at his request. He verbalized that all of the rings are in the envelope that was provided to him by security.  Electronically signed by Robbie Crawley RN on 6/20/2022 at 6:04 PM

## 2022-06-20 NOTE — PROGRESS NOTES
Patient unable to achieve adequate tidal volumes during SBT this morning. VT was consistently  <200ml on PS 5/5.  Vent mode switched back to full support and will attempt SBT again tomorrow am.

## 2022-06-20 NOTE — PROGRESS NOTES
Speech Language Pathology  SLP Attempt Note        Name: Monica Cowart  : 1940  Medical Diagnosis: Shortness of breath [R06.02]  Septicemia ([address] [A41.9]  Chronic anemia [D64.9]  Acute respiratory failure with hypoxia (HCC) [J96.01]  Aspiration pneumonia of left lower lobe, unspecified aspiration pneumonia type ([address] [J69.0]  Chronic kidney disease, unspecified CKD stage [N18.9]    SLP attempting to see the patient for dysphagia follow-up. Pt was intubated. On SBT today. Will need new orders for swallow evaluation and MBS once safe for PO trials. No charges filed.  Thank you,    Sandro Huynh M.A., [address]  Speech-Language Pathologist  Phone: [phone]

## 2022-06-20 NOTE — PROGRESS NOTES
Pulmonary & Critical Care Medicine ICU Progress Note    CC: shortness of breath     Events of Last 24 hours:   Bronchoscopy with mucous plugging, small amount of blood  Low TV with 5/5, increased to 10/5    Vascular lines: IV:   2022 PICC    MV: 2022 after failing Vapotherm    Resp Rate (Set): 20 bmp/Vt (Set, mL): 380 mL/ /FiO2 : 30 %  Recent Labs     22  0555 22  0550   PHART 7.391 7.357   KID5MUY 39.7 39.8   PO2ART 68.1* 78.1       IV:   sodium chloride      propofol 30 mcg/kg/min (22 0538)    fentaNYL 75 mcg/hr (22 0041)    sodium chloride      sodium chloride 75 mL/hr at 22 1709       Vitals:  Blood pressure (!) 113/41, pulse (!) 105, temperature 98.6 °F (37 °C), temperature source Bladder, resp. rate 20, height 5' 6\" (1.676 m), weight 270 lb (122.5 kg), SpO2 100 %, not currently breastfeeding. on vent  Temp  Av.7 °F (37.1 °C)  Min: 97.9 °F (36.6 °C)  Max: 99.1 °F (37.3 °C)    Intake/Output Summary (Last 24 hours) at 2022 0703  Last data filed at 2022 0600  Gross per 24 hour   Intake 3143.74 ml   Output 820 ml   Net 2323.74 ml     PE:  General: intubated, ill appearing    ENT: Pharynx with ETT. Resp: No crackles. No wheezing. CV: S1, S2. Trace edema  GI: NT, ND, +BS  Skin: Warm and dry. Neuro: PERRL. Awake and following commands.  Patellar reflexes are symmetric      Scheduled Meds:   ipratropium-albuterol  1 ampule Inhalation Q4H    lidocaine 1 % injection  5 mL IntraDERmal Once    mupirocin   Nasal BID    piperacillin-tazobactam  3,375 mg IntraVENous Q8H    chlorhexidine  15 mL Mouth/Throat BID    sodium chloride flush  10 mL IntraVENous 2 times per day    [Held by provider] apixaban  2.5 mg Oral BID    atorvastatin  40 mg Oral Nightly    carbidopa-levodopa  1 tablet Oral 4x Daily    DULoxetine  60 mg Oral Daily    budesonide-formoterol  2 puff Inhalation BID    lamoTRIgine  50 mg Oral BID    montelukast  10 mg Oral Daily    propafenone  150 mg Oral 3 times per day    vancomycin (VANCOCIN) intermittent dosing (placeholder)   Other RX Placeholder       Data:  CBC:   Recent Labs     06/18/22  0559 06/19/22  0550 06/20/22  0440   WBC 19.7* 15.0* 13.3*   HGB 8.7* 8.4* 8.0*   HCT 26.9* 26.7* 22.3*   MCV 86.4 88.1 86.6    220 235     BMP:   Recent Labs     06/18/22  0559 06/19/22  0549 06/20/22  0440    139 134*   K 4.2 3.8 3.7    105 101   CO2 26 18* 20*   BUN 42* 35* 31*   CREATININE 2.0* 1.8* 1.8*     LIVER PROFILE:   Recent Labs     06/18/22  0559 06/19/22  0549   AST 11* 13*   ALT <5* <5*   BILITOT 0.4 0.3   ALKPHOS 92 85       Microbiology:  6/17/2022 SARS-CoV-2 and influenza are negative  6/17/2022 MRSA DNA screen positive  6/17/2022 blood NGTD  6/17/2022 sputum NRF  6/18/2022 tracheal aspirate pending  6/19/2022 BAL pending  6/19/2022 respiratory viral panel pending    Imaging:    ASSESSMENT:  · Acute hypoxemic respiratory failure  · Aspiration pneumonia  · BETSY on BiPAP 22/15 with 1 L bleed in   · Parkinson's disease  · Atrial fibrillation  · Chronic kidney failure, baseline creatinine 1.6    PLAN:  Mechanical ventilation as per my orders. The ventilator was adjusted by me at the bedside for unstable, life threatening respiratory failure. IV Propofol for sedation, target RASS -2, with daily spontaneous awakening trial.  Fentanyl gtt as needed  Head of bed 30 degrees or higher at all times  · Daily spontaneous breathing trial   · Zosyn/Vanc D#4, change Zosyn to Cefepime   · Inhaled bronchodilators  · Eliquis on hold with hemoptysis/bloody secretions  · Prophylaxis: SCD, Bactroban, famotidine    Total critical care time caring for this patient with life threatening, unstable organ failure, including direct patient contact, management of life support systems, review of data including imaging and labs, discussions with other team members and physicians is 33 minutes so far today, excluding procedures.

## 2022-06-20 NOTE — PROGRESS NOTES
RT Inhaler-Nebulizer Bronchodilator Protocol Note    There is a bronchodilator order in the chart from a provider indicating to follow the RT Bronchodilator Protocol and there is an Initiate RT Inhaler-Nebulizer Bronchodilator Protocol order as well (see protocol at bottom of note). CXR Findings:  XR CHEST PORTABLE    Result Date: 6/19/2022  Supportive tubing projects in stable position. Partial regression of atelectasis in the right lung base. Persisting consolidation or or atelectasis in the left lung base. XR CHEST PORTABLE    Result Date: 6/18/2022  1. Endotracheal tube terminates in appropriate position above the nirali. 2.  Enteric tube terminates in the body of the stomach. 3.  Basilar opacities again demonstrated, right greater than left. Interval improved aeration of the left lung base. XR CHEST PORTABLE    Result Date: 6/18/2022  Increasing opacity in the right base, lobar atelectasis versus accumulating right pleural effusion. Combination of both entities or underlying pneumonia also possible. Persisting left basilar airspace disease and small pleural effusion. The findings from the last RT Protocol Assessment were as follows:   History Pulmonary Disease: (P) Chronic pulmonary disease  Respiratory Pattern: (P) Dyspnea on exertion or RR 21-25 bpm  Breath Sounds: (P) Intermittent or unilateral wheezes  Cough: (P) Weak, productive  Indication for Bronchodilator Therapy: Decreased or absent breath sounds  Bronchodilator Assessment Score: (P) 10    Aerosolized bronchodilator medication orders have been revised according to the RT Inhaler-Nebulizer Bronchodilator Protocol below. Respiratory Therapist to perform RT Therapy Protocol Assessment initially then follow the protocol. Repeat RT Therapy Protocol Assessment PRN for score 0-3 or on second treatment, BID, and PRN for scores above 3.     No Indications - adjust the frequency to every 6 hours PRN wheezing or bronchospasm, if no treatments needed after 48 hours then discontinue using Per Protocol order mode. If indication present, adjust the RT bronchodilator orders based on the Bronchodilator Assessment Score as indicated below. Use Inhaler orders unless patient has one or more of the following: on home nebulizer, not able to hold breath for 10 seconds, is not alert and oriented, cannot activate and use MDI correctly, or respiratory rate 25 breaths per minute or more, then use the equivalent nebulizer order(s) with same Frequency and PRN reasons based on the score. If a patient is on this medication at home then do not decrease Frequency below that used at home. 0-3 - enter or revise RT bronchodilator order(s) to equivalent RT Bronchodilator order with Frequency of every 4 hours PRN for wheezing or increased work of breathing using Per Protocol order mode. 4-6 - enter or revise RT Bronchodilator order(s) to two equivalent RT bronchodilator orders with one order with BID Frequency and one order with Frequency of every 4 hours PRN wheezing or increased work of breathing using Per Protocol order mode. 7-10 - enter or revise RT Bronchodilator order(s) to two equivalent RT bronchodilator orders with one order with TID Frequency and one order with Frequency of every 4 hours PRN wheezing or increased work of breathing using Per Protocol order mode. 11-13 - enter or revise RT Bronchodilator order(s) to one equivalent RT bronchodilator order with QID Frequency and an Albuterol order with Frequency of every 4 hours PRN wheezing or increased work of breathing using Per Protocol order mode. Greater than 13 - enter or revise RT Bronchodilator order(s) to one equivalent RT bronchodilator order with every 4 hours Frequency and an Albuterol order with Frequency of every 2 hours PRN wheezing or increased work of breathing using Per Protocol order mode.          Electronically signed by Bandar Kunz RCP on 6/19/2022 at 9:02 PM

## 2022-06-20 NOTE — PROGRESS NOTES
Patient started on SBT. She is alert and following commands. RT at bedside.  Electronically signed by Jaclyn Mac RN on 6/20/2022 at 8:40 AM

## 2022-06-20 NOTE — TELEPHONE ENCOUNTER
Patient cancelled appointment on 06/29/2022 with Dr Katie Rubin for6 mo ct fu pulmonary nodule, and CT scan . Reason: Pt in patient. Patient did not reschedule appointment. Appointment rescheduled for n/a.      Last OV 12/28/2021        Assessment:  · Pulmonary Nodules are stable or resolved except for new 5 mm RLL nodule      Not addressed today   · BETSY f/b outside provider  · Right lateral inferior chest wall pain 2/2 mechanical fall Feb 2020  · Parkinson Disease  · Asthma f/b PCP   · PND  · Paroxysmal atrial fibrillation on Eliquis   · Diabetes mellitus      Plan:   · CT CHEST no IV dye in 6 months, see me after   · Can try debrox for earwax, f/u with PCP

## 2022-06-20 NOTE — PROGRESS NOTES
4 Eyes Skin Assessment     The patient is being assess for   Shift Handoff    I agree that 2 RN's have performed a thorough Head to Toe Skin Assessment on the patient. ALL assessment sites listed below have been assessed. Areas assessed for pressure by both nurses:   [x]   Head, Face, and Ears   [x]   Shoulders, Back, and Chest, Abdomen  [x]   Arms, Elbows, and Hands   [x]   Coccyx, Sacrum, and Ischium  [x]   Legs, Feet, and Heels        Skin Assessed Under all Medical Devices by both nurses:  ETT, archuleta, OG and heel boots              All Mepilex Borders were peeled back and area peeked at by both nurses:  Yes  Please list where Mepilex Borders are located:  sacrum  proventive             **SHARE this note so that the co-signing nurse is able to place an eSignature**    Co-signer eSignature: Electronically signed by Jacob Hinton RN on 6/20/22 at 6:19 AM EDT    Does the Patient have Skin Breakdown related to pressure?   Yes [facility] WOUND CARE was Initiated documentation include the Annmarie-wound, Wound Assessment, Measurements, Dressing Treatment, Drainage, and Color\",              Vineet Prevention initiated:  Yes   Wound Care Orders initiated:  Yes      [address]  nurse consulted for Pressure Injury (Stage 3,4, Unstageable, DTI, NWPT, Complex wounds)and New or Established Ostomies:  Yes      Primary Nurse eSignature: Electronically signed by Marleny Bills RN on 6/19/22 at 8:37 PM EDT

## 2022-06-20 NOTE — CARE COORDINATION
INTERDISCIPLINARY PLAN OF CARE CONFERENCE    Date/Time: 6/20/2022 8:51 AM  Completed by: Nolberto RODRIGUEZ, JENNIFER   Case Management    Patient Name:  Gaviota Layton  YOB: 1940  Admitting Diagnosis: Shortness of breath [R06.02]  Septicemia ([facility] [address] [A41.9]  Chronic anemia [D64.9]  Acute respiratory failure with hypoxia ([facility] [address] [J96.01]  Aspiration pneumonia of left lower lobe, unspecified aspiration pneumonia type ([facility] [address] [J69.0]  Chronic kidney disease, unspecified CKD stage [N18.9]     Admit Date/Time:  6/17/2022 12:50 AM    Chart reviewed. Interdisciplinary team contacted or reviewed plan related to patient progress and discharge plans. Disciplines included Case Management, Nursing, and Dietitian. Current Status:ongoing. Vent  PT/OT recommendation for discharge plan of care: TBD    Expected D/C Disposition:  Home  Confirmed plan with patient and/or family Yes confirmed with: (name) pt and pt's  at bedside    Discharge Plan Comments: Chart review completed. Met with pt and pt's  at bedside. Pt remains on the Vent but awake; able to shake her head yes/no. Pt's  confirmed pt is for pt to return home on discharge. He stated that her home o2 is through [facility] for 2 liters at night and Bipap. Pt's  stated pt is on Eliquis prior to admission and gets it in the mail through [facility]. They denied needs for CM. Katheryn Soto in admitting aware pt needs initial IMM. Completed Interdisciplinary rounds with ICU staff. Jordana Brown at [facility] confirmed pt has home o2 at 1 liters at night with a Bipap. Jordana Brown stated she got a bipap in 2017. CM will continue to follow and assist. Please notify CM if needs or concerns arise.     Home O2 in place on admit: Yes at night

## 2022-06-20 NOTE — TELEPHONE ENCOUNTER
Refill Request     CONFIRM preferrred pharmacy with the patient. If Mail Order Rx - Pend for 90 day refill.       Last Seen: Last Seen Department: 4/28/2022  Last Seen by PCP: 4/28/2022    Last Written: 05/23/2022 15 Tablet 0 Refills    Next Appointment:   Future Appointments   Date Time Provider Katy Sorianoi   6/21/2022  8:00 AM Vaughn Fletcher, [place] AND AUDIO MMA   6/29/2022  9:30 AM MHC CT MAIN [facility]   6/29/2022 10:15 AM MD JEFFERSON Minaya PULM [facility]   6/30/2022  1:30 PM MAHAD Clark CNP  [place] - DYD   11/30/2022 10:00 AM Wale [place], APRN - CNP EAST SLEEP [facility]   12/6/2022  1:30 PM MD Sunita Draper [facility]       Future appointment scheduled      Requested Prescriptions     Pending Prescriptions Disp Refills    cetirizine (ZYRTEC) 10 MG tablet [Pharmacy Med Name: CETIRIZINE HCL 10 MG TABLET] 15 tablet 0     Sig: TAKE 1/2 TABLET BY MOUTH EVERY DAY

## 2022-06-20 NOTE — PROGRESS NOTES
6/20  Vanc random = 13.6 mcg/mL at 0440. Patient is adequately clearing vancomycin. Will schedule vancomycin 1000 mg q24. Recheck vanc trough on 6/22 at 0800.   Inés Pretty PharmD  6/20/2022 7:18 AM

## 2022-06-21 ENCOUNTER — APPOINTMENT (OUTPATIENT)
Dept: GENERAL RADIOLOGY | Age: 82
DRG: 004 | End: 2022-06-21
Payer: MEDICARE

## 2022-06-21 LAB
ANION GAP SERPL CALCULATED.3IONS-SCNC: 12 MMOL/L (ref 3–16)
BASE EXCESS ARTERIAL: -6.5 MMOL/L (ref -3–3)
BASOPHILS ABSOLUTE: 0 K/UL (ref 0–0.2)
BASOPHILS RELATIVE PERCENT: 0.4 %
BLOOD CULTURE, ROUTINE: NORMAL
BUN BLDV-MCNC: 37 MG/DL (ref 7–20)
CALCIUM SERPL-MCNC: 6.8 MG/DL (ref 8.3–10.6)
CARBOXYHEMOGLOBIN ARTERIAL: 0.3 % (ref 0–1.5)
CHLORIDE BLD-SCNC: 104 MMOL/L (ref 99–110)
CO2: 20 MMOL/L (ref 21–32)
CREAT SERPL-MCNC: 1.9 MG/DL (ref 0.6–1.2)
CULTURE, BLOOD 2: NORMAL
CULTURE, RESPIRATORY: NORMAL
EOSINOPHILS ABSOLUTE: 0.7 K/UL (ref 0–0.6)
EOSINOPHILS RELATIVE PERCENT: 5.9 %
GFR AFRICAN AMERICAN: 31
GFR NON-AFRICAN AMERICAN: 25
GLUCOSE BLD-MCNC: 123 MG/DL (ref 70–99)
GLUCOSE BLD-MCNC: 129 MG/DL (ref 70–99)
GLUCOSE BLD-MCNC: 139 MG/DL (ref 70–99)
GLUCOSE BLD-MCNC: 139 MG/DL (ref 70–99)
GLUCOSE BLD-MCNC: 142 MG/DL (ref 70–99)
GLUCOSE BLD-MCNC: 145 MG/DL (ref 70–99)
GRAM STAIN RESULT: NORMAL
HCO3 ARTERIAL: 19.2 MMOL/L (ref 21–29)
HCT VFR BLD CALC: 23.9 % (ref 36–48)
HEMOGLOBIN, ART, EXTENDED: 8.9 G/DL (ref 12–16)
HEMOGLOBIN: 7.8 G/DL (ref 12–16)
INFLUENZA A BY PCR: NOT DETECTED
INFLUENZA B BY PCR: NOT DETECTED
LYMPHOCYTES ABSOLUTE: 1.2 K/UL (ref 1–5.1)
LYMPHOCYTES RELATIVE PERCENT: 10.6 %
MCH RBC QN AUTO: 28 PG (ref 26–34)
MCHC RBC AUTO-ENTMCNC: 32.4 G/DL (ref 31–36)
MCV RBC AUTO: 86.5 FL (ref 80–100)
METHEMOGLOBIN ARTERIAL: 0.1 %
MONOCYTES ABSOLUTE: 1 K/UL (ref 0–1.3)
MONOCYTES RELATIVE PERCENT: 9.2 %
NEUTROPHILS ABSOLUTE: 8.2 K/UL (ref 1.7–7.7)
NEUTROPHILS RELATIVE PERCENT: 73.9 %
O2 SAT, ARTERIAL: 85.8 %
O2 THERAPY: ABNORMAL
PCO2 ARTERIAL: 39.2 MMHG (ref 35–45)
PDW BLD-RTO: 14 % (ref 12.4–15.4)
PERFORMED ON: ABNORMAL
PH ARTERIAL: 7.31 (ref 7.35–7.45)
PLATELET # BLD: 282 K/UL (ref 135–450)
PMV BLD AUTO: 7.8 FL (ref 5–10.5)
PO2 ARTERIAL: 54.8 MMHG (ref 75–108)
POTASSIUM SERPL-SCNC: 3.6 MMOL/L (ref 3.5–5.1)
RBC # BLD: 2.77 M/UL (ref 4–5.2)
RSV BY PCR: NOT DETECTED
RSV SOURCE: NORMAL
SODIUM BLD-SCNC: 136 MMOL/L (ref 136–145)
TCO2 ARTERIAL: 20.5 MMOL/L
WBC # BLD: 11.1 K/UL (ref 4–11)

## 2022-06-21 PROCEDURE — 6360000002 HC RX W HCPCS: Performed by: INTERNAL MEDICINE

## 2022-06-21 PROCEDURE — 6360000002 HC RX W HCPCS

## 2022-06-21 PROCEDURE — 2000000000 HC ICU R&B

## 2022-06-21 PROCEDURE — 2580000003 HC RX 258: Performed by: INTERNAL MEDICINE

## 2022-06-21 PROCEDURE — 2500000003 HC RX 250 WO HCPCS

## 2022-06-21 PROCEDURE — 80048 BASIC METABOLIC PNL TOTAL CA: CPT

## 2022-06-21 PROCEDURE — 94640 AIRWAY INHALATION TREATMENT: CPT

## 2022-06-21 PROCEDURE — 2500000003 HC RX 250 WO HCPCS: Performed by: INTERNAL MEDICINE

## 2022-06-21 PROCEDURE — 82803 BLOOD GASES ANY COMBINATION: CPT

## 2022-06-21 PROCEDURE — 6370000000 HC RX 637 (ALT 250 FOR IP): Performed by: INTERNAL MEDICINE

## 2022-06-21 PROCEDURE — A4216 STERILE WATER/SALINE, 10 ML: HCPCS | Performed by: INTERNAL MEDICINE

## 2022-06-21 PROCEDURE — 99291 CRITICAL CARE FIRST HOUR: CPT | Performed by: INTERNAL MEDICINE

## 2022-06-21 PROCEDURE — 85025 COMPLETE CBC W/AUTO DIFF WBC: CPT

## 2022-06-21 PROCEDURE — 99233 SBSQ HOSP IP/OBS HIGH 50: CPT | Performed by: INTERNAL MEDICINE

## 2022-06-21 PROCEDURE — 94761 N-INVAS EAR/PLS OXIMETRY MLT: CPT

## 2022-06-21 PROCEDURE — 2700000000 HC OXYGEN THERAPY PER DAY

## 2022-06-21 PROCEDURE — 71045 X-RAY EXAM CHEST 1 VIEW: CPT

## 2022-06-21 PROCEDURE — 36592 COLLECT BLOOD FROM PICC: CPT

## 2022-06-21 PROCEDURE — 94003 VENT MGMT INPAT SUBQ DAY: CPT

## 2022-06-21 RX ORDER — FUROSEMIDE 10 MG/ML
40 INJECTION INTRAMUSCULAR; INTRAVENOUS ONCE
Status: COMPLETED | OUTPATIENT
Start: 2022-06-21 | End: 2022-06-21

## 2022-06-21 RX ORDER — DEXMEDETOMIDINE HYDROCHLORIDE 4 UG/ML
.1-1.5 INJECTION, SOLUTION INTRAVENOUS CONTINUOUS
Status: DISCONTINUED | OUTPATIENT
Start: 2022-06-22 | End: 2022-06-28

## 2022-06-21 RX ADMIN — DULOXETINE HYDROCHLORIDE 60 MG: 60 CAPSULE, DELAYED RELEASE ORAL at 08:41

## 2022-06-21 RX ADMIN — CARBIDOPA AND LEVODOPA 1 TABLET: 25; 100 TABLET ORAL at 13:45

## 2022-06-21 RX ADMIN — Medication 15 ML: at 08:41

## 2022-06-21 RX ADMIN — CARBIDOPA AND LEVODOPA 1 TABLET: 25; 100 TABLET ORAL at 17:22

## 2022-06-21 RX ADMIN — SODIUM CHLORIDE, PRESERVATIVE FREE 10 ML: 5 INJECTION INTRAVENOUS at 08:45

## 2022-06-21 RX ADMIN — VANCOMYCIN HYDROCHLORIDE 1000 MG: 1 INJECTION, POWDER, LYOPHILIZED, FOR SOLUTION INTRAVENOUS at 08:58

## 2022-06-21 RX ADMIN — ATORVASTATIN CALCIUM 40 MG: 40 TABLET, FILM COATED ORAL at 21:44

## 2022-06-21 RX ADMIN — SODIUM CHLORIDE 30 MG/ML INHALATION SOLUTION 4 ML: 30 SOLUTION INHALANT at 08:03

## 2022-06-21 RX ADMIN — IPRATROPIUM BROMIDE AND ALBUTEROL SULFATE 1 AMPULE: 2.5; .5 SOLUTION RESPIRATORY (INHALATION) at 22:39

## 2022-06-21 RX ADMIN — APIXABAN 2.5 MG: 5 TABLET, FILM COATED ORAL at 10:34

## 2022-06-21 RX ADMIN — MUPIROCIN: 20 OINTMENT TOPICAL at 21:58

## 2022-06-21 RX ADMIN — CEFEPIME 2000 MG: 2 INJECTION, POWDER, FOR SOLUTION INTRAVENOUS at 10:34

## 2022-06-21 RX ADMIN — Medication 2 PUFF: at 08:03

## 2022-06-21 RX ADMIN — FUROSEMIDE 40 MG: 10 INJECTION, SOLUTION INTRAMUSCULAR; INTRAVENOUS at 17:22

## 2022-06-21 RX ADMIN — Medication 15 ML: at 21:44

## 2022-06-21 RX ADMIN — PROPAFENONE HYDROCHLORIDE 150 MG: 150 TABLET, FILM COATED ORAL at 21:58

## 2022-06-21 RX ADMIN — FUROSEMIDE 40 MG: 10 INJECTION, SOLUTION INTRAMUSCULAR; INTRAVENOUS at 10:26

## 2022-06-21 RX ADMIN — PROPAFENONE HYDROCHLORIDE 150 MG: 150 TABLET, FILM COATED ORAL at 05:50

## 2022-06-21 RX ADMIN — PROPAFENONE HYDROCHLORIDE 150 MG: 150 TABLET, FILM COATED ORAL at 13:45

## 2022-06-21 RX ADMIN — APIXABAN 2.5 MG: 5 TABLET, FILM COATED ORAL at 21:44

## 2022-06-21 RX ADMIN — PROPOFOL 15 MCG/KG/MIN: 10 INJECTION, EMULSION INTRAVENOUS at 23:05

## 2022-06-21 RX ADMIN — CEFEPIME 2000 MG: 2 INJECTION, POWDER, FOR SOLUTION INTRAVENOUS at 21:26

## 2022-06-21 RX ADMIN — Medication 2 PUFF: at 19:10

## 2022-06-21 RX ADMIN — IPRATROPIUM BROMIDE AND ALBUTEROL SULFATE 1 AMPULE: 2.5; .5 SOLUTION RESPIRATORY (INHALATION) at 08:02

## 2022-06-21 RX ADMIN — FAMOTIDINE 20 MG: 10 INJECTION INTRAVENOUS at 08:41

## 2022-06-21 RX ADMIN — MUPIROCIN: 20 OINTMENT TOPICAL at 08:42

## 2022-06-21 RX ADMIN — IPRATROPIUM BROMIDE AND ALBUTEROL SULFATE 1 AMPULE: 2.5; .5 SOLUTION RESPIRATORY (INHALATION) at 19:10

## 2022-06-21 RX ADMIN — MONTELUKAST SODIUM 10 MG: 10 TABLET, COATED ORAL at 08:41

## 2022-06-21 RX ADMIN — LAMOTRIGINE 50 MG: 25 TABLET ORAL at 21:46

## 2022-06-21 RX ADMIN — CARBIDOPA AND LEVODOPA 1 TABLET: 25; 100 TABLET ORAL at 21:44

## 2022-06-21 RX ADMIN — SODIUM CHLORIDE, PRESERVATIVE FREE 10 ML: 5 INJECTION INTRAVENOUS at 21:44

## 2022-06-21 RX ADMIN — CARBIDOPA AND LEVODOPA 1 TABLET: 25; 100 TABLET ORAL at 08:41

## 2022-06-21 RX ADMIN — PROPOFOL 15 MCG/KG/MIN: 10 INJECTION, EMULSION INTRAVENOUS at 13:52

## 2022-06-21 RX ADMIN — IPRATROPIUM BROMIDE AND ALBUTEROL SULFATE 1 AMPULE: 2.5; .5 SOLUTION RESPIRATORY (INHALATION) at 11:50

## 2022-06-21 RX ADMIN — IPRATROPIUM BROMIDE AND ALBUTEROL SULFATE 1 AMPULE: 2.5; .5 SOLUTION RESPIRATORY (INHALATION) at 03:11

## 2022-06-21 RX ADMIN — IPRATROPIUM BROMIDE AND ALBUTEROL SULFATE 1 AMPULE: 2.5; .5 SOLUTION RESPIRATORY (INHALATION) at 15:30

## 2022-06-21 RX ADMIN — PROPOFOL 20 MCG/KG/MIN: 10 INJECTION, EMULSION INTRAVENOUS at 05:52

## 2022-06-21 RX ADMIN — LAMOTRIGINE 50 MG: 25 TABLET ORAL at 08:45

## 2022-06-21 ASSESSMENT — PULMONARY FUNCTION TESTS
PIF_VALUE: 23
PIF_VALUE: 24
PIF_VALUE: 24
PIF_VALUE: 22
PIF_VALUE: 24
PIF_VALUE: 23
PIF_VALUE: 25
PIF_VALUE: 41
PIF_VALUE: 11
PIF_VALUE: 25

## 2022-06-21 NOTE — PROGRESS NOTES
order(s) to equivalent RT Bronchodilator order with Frequency of every 4 hours PRN for wheezing or increased work of breathing using Per Protocol order mode. 4-6 - enter or revise RT Bronchodilator order(s) to two equivalent RT bronchodilator orders with one order with BID Frequency and one order with Frequency of every 4 hours PRN wheezing or increased work of breathing using Per Protocol order mode. 7-10 - enter or revise RT Bronchodilator order(s) to two equivalent RT bronchodilator orders with one order with TID Frequency and one order with Frequency of every 4 hours PRN wheezing or increased work of breathing using Per Protocol order mode. 11-13 - enter or revise RT Bronchodilator order(s) to one equivalent RT bronchodilator order with QID Frequency and an Albuterol order with Frequency of every 4 hours PRN wheezing or increased work of breathing using Per Protocol order mode. Greater than 13 - enter or revise RT Bronchodilator order(s) to one equivalent RT bronchodilator order with every 4 hours Frequency and an Albuterol order with Frequency of every 2 hours PRN wheezing or increased work of breathing using Per Protocol order mode. RT to enter RT Home Evaluation for COPD & MDI Assessment order using Per Protocol order mode.     Electronically signed by Micheline Pelaez RCP on 6/21/2022 at 7:43 PM

## 2022-06-21 NOTE — PROGRESS NOTES
Care Rounds completed with Dr. Era Vigil and the multidisciplinary team. Reviewed labs, meds, vital signs, assessment and plan of care reviewed. See dictated note and new orders for details. Plan is stop the patients SBT now, restart sedation per protocol, Start precedex 6/22/22 at 0700 for SBT tomorrow, Lasix 40 mg times 1 today, Continue TF.  Electronically signed by Samantha Menendez RN on 6/21/2022 at 10:19 AM

## 2022-06-21 NOTE — PLAN OF CARE
Problem: Pain  Goal: Verbalizes/displays adequate comfort level or baseline comfort level  Outcome: Progressing     Problem: ABCDS Injury Assessment  Goal: Absence of physical injury  Outcome: Progressing  Flowsheets (Taken 6/20/2022 2042)  Absence of Physical Injury: Implement safety measures based on patient assessment     Problem: Skin/Tissue Integrity  Goal: Absence of new skin breakdown  Description: 1. Monitor for areas of redness and/or skin breakdown  2. Assess vascular access sites hourly  3. Every 4-6 hours minimum:  Change oxygen saturation probe site  4. Every 4-6 hours:  If on nasal continuous positive airway pressure, respiratory therapy assess nares and determine need for appliance change or resting period. Outcome: Progressing     Problem: Chronic Conditions and Co-morbidities  Goal: Patient's chronic conditions and co-morbidity symptoms are monitored and maintained or improved  Outcome: Progressing     Problem: Safety - Adult  Goal: Free from fall injury  Outcome: Progressing     Problem: Safety - Medical Restraint  Goal: Remains free of injury from restraints (Restraint for Interference with Medical Device)  Description: INTERVENTIONS:  1. Determine that other, less restrictive measures have been tried or would not be effective before applying the restraint  2. Evaluate the patient's condition at the time of restraint application  3. Inform patient/family regarding the reason for restraint  4.  Q2H: Monitor safety, psychosocial status, comfort, nutrition and hydration  Outcome: Progressing  Flowsheets  Taken 6/20/2022 1800 by Samantha Menendez RN  Remains free of injury from restraints (restraint for interference with medical device): Every 2 hours: Monitor safety, psychosocial status, comfort, nutrition and hydration  Taken 6/20/2022 1600 by Samantha Menendez RN  Remains free of injury from restraints (restraint for interference with medical device): Every 2 hours: Monitor safety, psychosocial status, comfort, nutrition and hydration  Taken 6/20/2022 1400 by Peggy Landau, RN  Remains free of injury from restraints (restraint for interference with medical device): Every 2 hours: Monitor safety, psychosocial status, comfort, nutrition and hydration  Taken 6/20/2022 1200 by Peggy Landau, RN  Remains free of injury from restraints (restraint for interference with medical device): Every 2 hours: Monitor safety, psychosocial status, comfort, nutrition and hydration  Taken 6/20/2022 1000 by Peggy Landau, RN  Remains free of injury from restraints (restraint for interference with medical device): Every 2 hours: Monitor safety, psychosocial status, comfort, nutrition and hydration  Taken 6/20/2022 0800 by Peggy Landau, RN  Remains free of injury from restraints (restraint for interference with medical device): Every 2 hours: Monitor safety, psychosocial status, comfort, nutrition and hydration

## 2022-06-21 NOTE — PROGRESS NOTES
06/21/22 0311   NICU Vent Information   Vent Type 840   Vent Mode AC/VC   Vt (Set, mL) 380 mL   Vt Exhaled 375 mL   Resp Rate (Set) 20 bmp   Rate Measured 20 br/min   Minute Volume 7.48 Liters   Peak Flow 50 L/min   Pressure Support 0 cmH20   FiO2  30 %   Peak Inspiratory Pressure 25 cmH2O   I:E Ratio 1:2.70   Sensitivity 3   High Peep/I Pressure 0   PEEP/CPAP (cmH2O) 5   I Time/ I Time % 0 s   Mean Airway Pressure 11 cmH20   Plateau Pressure 21 MPU88   Cough/Sputum   Sputum How Obtained Endotracheal;Suctioned   Cough Non-productive   Sputum Amount None   Breath Sounds   Right Upper Lobe Diminished   Right Middle Lobe Diminished   Right Lower Lobe Diminished   Left Upper Lobe Diminished   Left Lower Lobe Diminished   Additional Respiratory Assessments   Heart Rate 89   Resp 20   SpO2 99 %   Position Semi-Mccollum's   Humidification Source Heated wire   Humidification Temp 37   Circuit Condensation Drained   Vent Alarm Settings   High Pressure  45 cmH2O   Low Minute Volume Alarm 2 L/min   Low Exhaled Vt  210 mL   RR High 40 br/min   ETT (adult)   Placement Date/Time: 06/18/22 1650   Present on Admission/Arrival: No  Placement Verified By: Direct visualization;Colorimetric ETCO2 device  Preoxygenation: Yes  Airway Tube Size: 8 mm  Laryngoscope: GlideScope  Blade Size: 3  Location: Oral  Secured. ..    Secured At 23 cm   Measured From Lips   ETT Placement Right   Secured By Commercial tube hughes   Site Assessment Dry

## 2022-06-21 NOTE — PROGRESS NOTES
Vancomycin Day: 5  Current Regimen: 1000 mg IV every 24 hours    Patient's labs, cultures, vitals, and vancomycin regimen reviewed. No changes today.  Level due 6/22 @ 0800

## 2022-06-21 NOTE — PROGRESS NOTES
Admit: 2022    Name:  Humberto Murray  Room:  3004/3004-01  MRN:    [id number]    Critical Care Daily Progress Note for 2022     Admitted with respiratory failure    Interval History:     Transfered to the ICU on  for worsening respiratory status and increasing oxygen requirement. Placed on Vapotherm. Currently on 100% 40 L in addition to a nonrebreather      Patient had to be intubated yesterday because of persistent hypoxia on Vapotherm. Underwent bronc this morning. Bloody secretions +      Patient currently awake, on SBT-low volumes      SBT again today.     Scheduled Meds:   vancomycin  1,000 mg IntraVENous Q24H    famotidine (PEPCID) injection  20 mg IntraVENous Daily    cefepime  2,000 mg IntraVENous Q12H    ipratropium-albuterol  1 ampule Inhalation Q4H    lidocaine 1 % injection  5 mL IntraDERmal Once    mupirocin   Nasal BID    chlorhexidine  15 mL Mouth/Throat BID    sodium chloride flush  10 mL IntraVENous 2 times per day    apixaban  2.5 mg Oral BID    atorvastatin  40 mg Oral Nightly    carbidopa-levodopa  1 tablet Oral 4x Daily    DULoxetine  60 mg Oral Daily    budesonide-formoterol  2 puff Inhalation BID    lamoTRIgine  50 mg Oral BID    montelukast  10 mg Oral Daily    propafenone  150 mg Oral 3 times per day    vancomycin (VANCOCIN) intermittent dosing (placeholder)   Other RX Placeholder       Continuous Infusions:   [START ON 2022] dexmedetomidine HCl in NaCl      fentaNYL 50 mcg/hr (22 0002)    sodium chloride      propofol 20 mcg/kg/min (22 0552)    sodium chloride         PRN Meds:  carboxymethylcellulose PF, fentaNYL **AND** fentaNYL, sodium chloride (Inhalant), albuterol, sodium chloride, midazolam, fentanNYL, sodium chloride flush, sodium chloride, potassium chloride, magnesium sulfate, promethazine **OR** ondansetron, acetaminophen **OR** acetaminophen, albuterol                  Objective:     Temp  Av.9 °F (37.2 °C)  Min: 98.8 °F (37.1 °C)  Max: 99.5 °F (37.5 °C)  Pulse  Av.4  Min: 89  Max: 113  BP  Min: 94/32  Max: 167/132  SpO2  Av.2 %  Min: 89 %  Max: 100 %  FiO2   Av.6 %  Min: 30 %  Max: 35 %  Patient Vitals for the past 4 hrs:   BP Temp Temp src Pulse Resp SpO2   22 1200 (!) 121/45 99.5 °F (37.5 °C) Bladder (!) 106 20 92 %   22 1100 (!) 167/132 -- -- (!) 108 20 93 %   22 1010 (!) 119/42 -- -- (!) 112 22 (!) 89 %   22 1000 -- -- -- (!) 113 29 94 %   22 0929 -- -- -- (!) 112 30 92 %         Intake/Output Summary (Last 24 hours) at 2022 1309  Last data filed at 2022 0600  Gross per 24 hour   Intake 3326.17 ml   Output 745 ml   Net 2581.17 ml       Physical Exam:    General Appearance: Intubated, awake on SBT  Eyes: pupils equal, round, and reactive to light, extraocular eye movements intact, conjunctivae normal and sclera anicteric   Neck: neck supple and non tender without mass, no thyromegaly, no thyroid nodules and no cervical adenopathy   Pulmonary/Chest: Accessory muscle use plus. No wheezing.   Bilateral rhonchi  Cardiovascular: normal rate, regular rhythm, normal S1 and S2, no murmurs, rubs, clicks or gallops, distal pulses intact, no carotid bruits, no murmurs, no gallops, no carotid bruits and no JVD   Abdomen: obese, soft, non-tender, non-distended, normal bowel sounds, no masses or organomegaly   Extremities: No edema or cyanosis  Musculoskeletal: normal range of motion, no joint swelling, deformity or tenderness   Neurologic: Awake    Lab Data:  CBC:   Recent Labs     22  0550 22  0440 22  0430   WBC 15.0* 13.3* 11.1*   RBC 3.03* 2.58* 2.77*   HGB 8.4* 8.0* 7.8*   HCT 26.7* 22.3* 23.9*   MCV 88.1 86.6 86.5   RDW 14.0 14.0 14.0    235 282     BMP:   Recent Labs     22  0549 22  0440 22  0430    134* 136   K 3.8 3.7 3.6    101 104   CO2 18* 20* 20*   BUN 35* 31* 37*   CREATININE 1.8* 1.8* 1.9*     BNP: No results for input(s): BNP in the last 72 hours. PT/INR:   Recent Labs     06/19/22  1004   PROTIME 19.0*   INR 1.61*     APTT:No results for input(s): APTT in the last 72 hours. CARDIAC ENZYMES:   No results for input(s): CKMB, CKMBINDEX, TROPONINI in the last 72 hours. Invalid input(s): CKTOTAL;3  FASTING LIPID PANEL:  Lab Results   Component Value Date    CHOL 147 09/27/2021    HDL 49 09/27/2021    HDL 72 12/20/2011    TRIG 194 09/27/2021     LIVER PROFILE:   Recent Labs     06/19/22  0549   AST 13*   ALT <5*   BILITOT 0.3   ALKPHOS 85         XR CHEST PORTABLE   Final Result   No acute process. Bibasilar hypoaeration      Stable cardiomegaly         IR PICC WO SQ PORT/PUMP > 5 YEARS   Final Result      XR CHEST PORTABLE   Final Result   Supportive tubing projects in stable position. Partial regression of atelectasis in the right lung base. Persisting   consolidation or or atelectasis in the left lung base. XR CHEST PORTABLE   Final Result   1. Endotracheal tube terminates in appropriate position above the nirali. 2.  Enteric tube terminates in the body of the stomach. 3.  Basilar opacities again demonstrated, right greater than left. Interval   improved aeration of the left lung base. XR CHEST PORTABLE   Final Result   Increasing opacity in the right base, lobar atelectasis versus accumulating   right pleural effusion. Combination of both entities or underlying pneumonia   also possible. Persisting left basilar airspace disease and small pleural effusion. XR CHEST PORTABLE   Final Result   Hazy bilateral lobe airspace opacities and small left pleural effusion. This   could represent atelectasis or pneumonia in the appropriate clinical setting. CT CHEST WO CONTRAST   Final Result   1. Respiratory motion in the lower lungs. The right base has linear and   dependent atelectasis.   The left does have atelectatic changes but could also   have some sciatica 08/09/2016    Hypothyroidism 01/27/2015    Postural dizziness 02/20/2013    Anemia of chronic disease     GERD (gastroesophageal reflux disease)     Essential hypertension     Hyperlipidemia     Restless leg syndrome     L carotid artery stenosis s/p CEA 01/01/2002            Acute Hypox Resp Failure. Aspiration PNA. Oxygen requirements increased rapidly from 2l/min to NRB to vapotherm  Currently 100% 40 L on Vapotherm in addition to a nonrebreather. Might have to be intubated. Check Resp Culture. Add 3% saline neb. Continue IV Zosyn and vancomycin. Change Zosyn to cefepime. Day #5. Intubated 6/18. SBT again today. Lasix 40 mg IV x1 today      Sepsis POA - due to above.       Dysphagia. Abnormal CT  Cough with every meal intake per family report. Underlying Parkinson's. I ordered for MBS and esophagram, though these will unlikely be done till she is better clinically. Currently on tube feeds      CKD IIIB  Cr slight better then baseline actually. Clinically does not appear fluid overloaded. DC IV fluids  Lasix 40 mg IV x1     Parkinson's on Sinemet.       Afib Paroxysmal.   Rythmol   Hold Eliquis given bloody secretions during bronch.      HTN - BP on the low side. Nifedipine stopped due to this and also leg edema.    Consider alternative when restarting.         DVT Prophylaxis: eliquis held for bloody secretions seen during bronc  Diet:  Tube feeds  Code Status: Full Code    Zuri Washington MD

## 2022-06-21 NOTE — PROGRESS NOTES
4 Eyes Skin Assessment     The patient is being assess for   Shift Handoff    I agree that 2 RN's have performed a thorough Head to Toe Skin Assessment on the patient. ALL assessment sites listed below have been assessed. Areas assessed for pressure by both nurses:   [x]   Head, Face, and Ears   [x]   Shoulders, Back, and Chest, Abdomen  [x]   Arms, Elbows, and Hands   [x]   Coccyx, Sacrum, and Ischium  [x]   Legs, Feet, and Heels        Skin Assessed Under all Medical Devices by both nurses:  ETT, archuleta, OG and heel boots              All Mepilex Borders were peeled back and area peeked at by both nurses:  Yes  Please list where Mepilex Borders are located:  none             **SHARE this note so that the co-signing nurse is able to place an eSignature**    Co-signer eSignature: Electronically signed by  Dinora Jeter RN on 6/20/22 at 10:47 PM EDT    Does the Patient have Skin Breakdown related to pressure?   Yes LDA WOUND CARE was Initiated documentation include the Annmarie-wound, Wound Assessment, Measurements, Dressing Treatment, Drainage, and Color\",              Vineet Prevention initiated:  Yes   Wound Care Orders initiated:  Yes      [id number]  nurse consulted for Pressure Injury (Stage 3,4, Unstageable, DTI, NWPT, Complex wounds)and New or Established Ostomies:  Yes      Primary Nurse eSignature: Electronically signed by Claire Prater RN on 6/20/22 at 9:00 PM EDT

## 2022-06-21 NOTE — PROGRESS NOTES
Pulmonary & Critical Care Medicine ICU Progress Note    CC: shortness of breath     Events of Last 24 hours:   SBT  Poor urine output      Vascular lines: IV:   2022 PICC    MV: 2022 after failing Vapotherm    Resp Rate (Set): 20 bmp/Vt (Set, mL): 380 mL/ /FiO2 : 30 %  Recent Labs     22  0550 22  0430   PHART 7.357 7.309*   BRB7CAN 39.8 39.2   PO2ART 78.1 54.8*       IV:   fentaNYL 50 mcg/hr (22 0002)    sodium chloride      propofol 20 mcg/kg/min (22 0552)    sodium chloride         Vitals:  Blood pressure (!) 97/27, pulse (!) 108, temperature 98.8 °F (37.1 °C), temperature source Bladder, resp. rate 20, height 5' 6\" (1.676 m), weight 249 lb 1.6 oz (113 kg), SpO2 95 %, not currently breastfeeding. on vent  Temp  Av.9 °F (37.2 °C)  Min: 98.7 °F (37.1 °C)  Max: 99.3 °F (37.4 °C)    Intake/Output Summary (Last 24 hours) at 2022 4888  Last data filed at 2022 0600  Gross per 24 hour   Intake 3528.12 ml   Output 745 ml   Net 2783.12 ml     PE:  General: intubated, ill appearing    ENT: Pharynx with ETT. Resp: No crackles. No wheezing. CV: S1, S2. Trace edema  GI: NT, ND, +BS  Skin: Warm and dry. Neuro: PERRL. Awake and following commands.  Patellar reflexes are symmetric      Scheduled Meds:   vancomycin  1,000 mg IntraVENous Q24H    famotidine (PEPCID) injection  20 mg IntraVENous Daily    cefepime  2,000 mg IntraVENous Q12H    ipratropium-albuterol  1 ampule Inhalation Q4H    lidocaine 1 % injection  5 mL IntraDERmal Once    mupirocin   Nasal BID    chlorhexidine  15 mL Mouth/Throat BID    sodium chloride flush  10 mL IntraVENous 2 times per day    apixaban  2.5 mg Oral BID    atorvastatin  40 mg Oral Nightly    carbidopa-levodopa  1 tablet Oral 4x Daily    DULoxetine  60 mg Oral Daily    budesonide-formoterol  2 puff Inhalation BID    lamoTRIgine  50 mg Oral BID    montelukast  10 mg Oral Daily    propafenone  150 mg Oral 3 times per day    vancomycin (VANCOCIN) intermittent dosing (placeholder)   Other RX Placeholder       Data:  CBC:   Recent Labs     06/19/22  0550 06/20/22  0440 06/21/22  0430   WBC 15.0* 13.3* 11.1*   HGB 8.4* 8.0* 7.8*   HCT 26.7* 22.3* 23.9*   MCV 88.1 86.6 86.5    235 282     BMP:   Recent Labs     06/19/22  0549 06/20/22  0440 06/21/22  0430    134* 136   K 3.8 3.7 3.6    101 104   CO2 18* 20* 20*   BUN 35* 31* 37*   CREATININE 1.8* 1.8* 1.9*     LIVER PROFILE:   Recent Labs     06/19/22  0549   AST 13*   ALT <5*   BILITOT 0.3   ALKPHOS 80       Microbiology:  6/17/2022 SARS-CoV-2 and influenza are negative  6/17/2022 MRSA DNA screen positive  6/17/2022 blood NGTD  6/17/2022 sputum NRF  6/18/2022 tracheal aspirate NRF  6/19/2022 BAL pending  6/19/2022 respiratory viral panel pending    Imaging:    ASSESSMENT:  · Acute hypoxemic respiratory failure  · Aspiration pneumonia  · BETSY on BiPAP 22/15 with 1 L bleed in   · Parkinson's disease  · Atrial fibrillation  · Chronic kidney failure, baseline creatinine 1.6    PLAN:  Mechanical ventilation as per my orders. The ventilator was adjusted by me at the bedside for unstable, life threatening respiratory failure. IV Propofol for sedation, target RASS -2, with daily spontaneous awakening trial.  Fentanyl gtt as needed.   Plan on starting low dose precedex in am for breathing trial.   Head of bed 30 degrees or higher at all times  · Daily spontaneous breathing trial   · Antibiotic day #5, now cefepime and vancomycin  · Inhaled bronchodilators  · Eliquis on hold with hemoptysis/bloody secretions, resumed today  · Lasix 40 X 1 today, may repeat if not going to be negative   · Prophylaxis: SCD, Bactroban, famotidine    Total critical care time caring for this patient with life threatening, unstable organ failure, including direct patient contact, management of life support systems, review of data including imaging and labs, discussions with other team members and physicians is 31 minutes so far today, excluding procedures.

## 2022-06-22 ENCOUNTER — APPOINTMENT (OUTPATIENT)
Dept: GENERAL RADIOLOGY | Age: 82
DRG: 004 | End: 2022-06-22
Payer: MEDICARE

## 2022-06-22 LAB
ANION GAP SERPL CALCULATED.3IONS-SCNC: 13 MMOL/L (ref 3–16)
BACTERIA: ABNORMAL /HPF
BASE EXCESS ARTERIAL: -5.5 MMOL/L (ref -3–3)
BASE EXCESS ARTERIAL: -6.9 MMOL/L (ref -3–3)
BASOPHILS ABSOLUTE: 0 K/UL (ref 0–0.2)
BASOPHILS RELATIVE PERCENT: 0.2 %
BILIRUBIN URINE: NEGATIVE
BLOOD, URINE: NEGATIVE
BUN BLDV-MCNC: 42 MG/DL (ref 7–20)
CALCIUM SERPL-MCNC: 6.7 MG/DL (ref 8.3–10.6)
CARBOXYHEMOGLOBIN ARTERIAL: 0 % (ref 0–1.5)
CARBOXYHEMOGLOBIN ARTERIAL: 0.2 % (ref 0–1.5)
CHLORIDE BLD-SCNC: 106 MMOL/L (ref 99–110)
CHLORIDE URINE RANDOM: <20 MMOL/L
CLARITY: CLEAR
CO2: 19 MMOL/L (ref 21–32)
COLOR: YELLOW
CREAT SERPL-MCNC: 2.2 MG/DL (ref 0.6–1.2)
EOSINOPHILS ABSOLUTE: 0.4 K/UL (ref 0–0.6)
EOSINOPHILS RELATIVE PERCENT: 4 %
EPITHELIAL CELLS, UA: ABNORMAL /HPF (ref 0–5)
GFR AFRICAN AMERICAN: 26
GFR NON-AFRICAN AMERICAN: 21
GLUCOSE BLD-MCNC: 122 MG/DL (ref 70–99)
GLUCOSE BLD-MCNC: 125 MG/DL (ref 70–99)
GLUCOSE BLD-MCNC: 129 MG/DL (ref 70–99)
GLUCOSE BLD-MCNC: 132 MG/DL (ref 70–99)
GLUCOSE BLD-MCNC: 150 MG/DL (ref 70–99)
GLUCOSE URINE: NEGATIVE MG/DL
HCO3 ARTERIAL: 18.8 MMOL/L (ref 21–29)
HCO3 ARTERIAL: 20.8 MMOL/L (ref 21–29)
HCT VFR BLD CALC: 23.4 % (ref 36–48)
HEMOGLOBIN, ART, EXTENDED: 7.6 G/DL (ref 12–16)
HEMOGLOBIN, ART, EXTENDED: 8.1 G/DL (ref 12–16)
HEMOGLOBIN: 7.6 G/DL (ref 12–16)
KETONES, URINE: ABNORMAL MG/DL
LEUKOCYTE ESTERASE, URINE: NEGATIVE
LYMPHOCYTES ABSOLUTE: 0.8 K/UL (ref 1–5.1)
LYMPHOCYTES RELATIVE PERCENT: 7.5 %
MCH RBC QN AUTO: 28 PG (ref 26–34)
MCHC RBC AUTO-ENTMCNC: 32.4 G/DL (ref 31–36)
MCV RBC AUTO: 86.5 FL (ref 80–100)
METHEMOGLOBIN ARTERIAL: 0.1 %
METHEMOGLOBIN ARTERIAL: 0.3 %
MICROSCOPIC EXAMINATION: YES
MONOCYTES ABSOLUTE: 0.9 K/UL (ref 0–1.3)
MONOCYTES RELATIVE PERCENT: 8.4 %
MUCUS: ABNORMAL /LPF
NEUTROPHILS ABSOLUTE: 8.2 K/UL (ref 1.7–7.7)
NEUTROPHILS RELATIVE PERCENT: 79.9 %
NITRITE, URINE: NEGATIVE
O2 SAT, ARTERIAL: 95.2 %
O2 SAT, ARTERIAL: 95.2 %
O2 THERAPY: ABNORMAL
O2 THERAPY: ABNORMAL
PCO2 ARTERIAL: 38.4 MMHG (ref 35–45)
PCO2 ARTERIAL: 44.3 MMHG (ref 35–45)
PDW BLD-RTO: 14.1 % (ref 12.4–15.4)
PERFORMED ON: ABNORMAL
PH ARTERIAL: 7.29 (ref 7.35–7.45)
PH ARTERIAL: 7.31 (ref 7.35–7.45)
PH UA: 5.5 (ref 5–8)
PLATELET # BLD: 261 K/UL (ref 135–450)
PMV BLD AUTO: 8.3 FL (ref 5–10.5)
PO2 ARTERIAL: 82.2 MMHG (ref 75–108)
PO2 ARTERIAL: 84 MMHG (ref 75–108)
POTASSIUM SERPL-SCNC: 3.8 MMOL/L (ref 3.5–5.1)
POTASSIUM, UR: 26.3 MMOL/L
PROTEIN UA: ABNORMAL MG/DL
RBC # BLD: 2.7 M/UL (ref 4–5.2)
RBC UA: ABNORMAL /HPF (ref 0–4)
SODIUM BLD-SCNC: 138 MMOL/L (ref 136–145)
SODIUM URINE: <20 MMOL/L
SPECIFIC GRAVITY UA: 1.02 (ref 1–1.03)
TCO2 ARTERIAL: 19.9 MMOL/L
TCO2 ARTERIAL: 22.1 MMOL/L
TRIGL SERPL-MCNC: 105 MG/DL (ref 0–150)
URINE TYPE: ABNORMAL
UROBILINOGEN, URINE: 0.2 E.U./DL
VANCOMYCIN TROUGH: 26.7 UG/ML (ref 10–20)
WBC # BLD: 10.3 K/UL (ref 4–11)
WBC UA: ABNORMAL /HPF (ref 0–5)

## 2022-06-22 PROCEDURE — 80202 ASSAY OF VANCOMYCIN: CPT

## 2022-06-22 PROCEDURE — 2000000000 HC ICU R&B

## 2022-06-22 PROCEDURE — 6370000000 HC RX 637 (ALT 250 FOR IP): Performed by: INTERNAL MEDICINE

## 2022-06-22 PROCEDURE — 2500000003 HC RX 250 WO HCPCS: Performed by: INTERNAL MEDICINE

## 2022-06-22 PROCEDURE — 6360000002 HC RX W HCPCS: Performed by: INTERNAL MEDICINE

## 2022-06-22 PROCEDURE — 2580000003 HC RX 258: Performed by: INTERNAL MEDICINE

## 2022-06-22 PROCEDURE — 84478 ASSAY OF TRIGLYCERIDES: CPT

## 2022-06-22 PROCEDURE — 82436 ASSAY OF URINE CHLORIDE: CPT

## 2022-06-22 PROCEDURE — 84300 ASSAY OF URINE SODIUM: CPT

## 2022-06-22 PROCEDURE — 80048 BASIC METABOLIC PNL TOTAL CA: CPT

## 2022-06-22 PROCEDURE — 36600 WITHDRAWAL OF ARTERIAL BLOOD: CPT

## 2022-06-22 PROCEDURE — 94761 N-INVAS EAR/PLS OXIMETRY MLT: CPT

## 2022-06-22 PROCEDURE — 94640 AIRWAY INHALATION TREATMENT: CPT

## 2022-06-22 PROCEDURE — 81001 URINALYSIS AUTO W/SCOPE: CPT

## 2022-06-22 PROCEDURE — 94003 VENT MGMT INPAT SUBQ DAY: CPT

## 2022-06-22 PROCEDURE — 2700000000 HC OXYGEN THERAPY PER DAY

## 2022-06-22 PROCEDURE — 85025 COMPLETE CBC W/AUTO DIFF WBC: CPT

## 2022-06-22 PROCEDURE — A4216 STERILE WATER/SALINE, 10 ML: HCPCS | Performed by: INTERNAL MEDICINE

## 2022-06-22 PROCEDURE — 71045 X-RAY EXAM CHEST 1 VIEW: CPT

## 2022-06-22 PROCEDURE — P9047 ALBUMIN (HUMAN), 25%, 50ML: HCPCS | Performed by: INTERNAL MEDICINE

## 2022-06-22 PROCEDURE — 82803 BLOOD GASES ANY COMBINATION: CPT

## 2022-06-22 PROCEDURE — 84133 ASSAY OF URINE POTASSIUM: CPT

## 2022-06-22 PROCEDURE — 99233 SBSQ HOSP IP/OBS HIGH 50: CPT | Performed by: INTERNAL MEDICINE

## 2022-06-22 PROCEDURE — 36592 COLLECT BLOOD FROM PICC: CPT

## 2022-06-22 PROCEDURE — 99291 CRITICAL CARE FIRST HOUR: CPT | Performed by: INTERNAL MEDICINE

## 2022-06-22 RX ORDER — ALBUMIN (HUMAN) 12.5 G/50ML
25 SOLUTION INTRAVENOUS EVERY 8 HOURS
Status: COMPLETED | OUTPATIENT
Start: 2022-06-22 | End: 2022-06-24

## 2022-06-22 RX ADMIN — CEFEPIME 2000 MG: 2 INJECTION, POWDER, FOR SOLUTION INTRAVENOUS at 09:29

## 2022-06-22 RX ADMIN — ATORVASTATIN CALCIUM 40 MG: 40 TABLET, FILM COATED ORAL at 21:08

## 2022-06-22 RX ADMIN — Medication 0.5 MCG/KG/HR: at 17:36

## 2022-06-22 RX ADMIN — IPRATROPIUM BROMIDE AND ALBUTEROL SULFATE 1 AMPULE: 2.5; .5 SOLUTION RESPIRATORY (INHALATION) at 02:49

## 2022-06-22 RX ADMIN — MUPIROCIN: 20 OINTMENT TOPICAL at 21:11

## 2022-06-22 RX ADMIN — CARBIDOPA AND LEVODOPA 1 TABLET: 25; 100 TABLET ORAL at 13:49

## 2022-06-22 RX ADMIN — SODIUM CHLORIDE, PRESERVATIVE FREE 10 ML: 5 INJECTION INTRAVENOUS at 09:00

## 2022-06-22 RX ADMIN — Medication 0.2 MCG/KG/HR: at 06:16

## 2022-06-22 RX ADMIN — APIXABAN 2.5 MG: 5 TABLET, FILM COATED ORAL at 09:38

## 2022-06-22 RX ADMIN — CARBIDOPA AND LEVODOPA 1 TABLET: 25; 100 TABLET ORAL at 21:08

## 2022-06-22 RX ADMIN — Medication 2 PUFF: at 19:19

## 2022-06-22 RX ADMIN — PROPAFENONE HYDROCHLORIDE 150 MG: 150 TABLET, FILM COATED ORAL at 13:49

## 2022-06-22 RX ADMIN — IPRATROPIUM BROMIDE AND ALBUTEROL SULFATE 1 AMPULE: 2.5; .5 SOLUTION RESPIRATORY (INHALATION) at 23:25

## 2022-06-22 RX ADMIN — IPRATROPIUM BROMIDE AND ALBUTEROL SULFATE 1 AMPULE: 2.5; .5 SOLUTION RESPIRATORY (INHALATION) at 15:36

## 2022-06-22 RX ADMIN — MUPIROCIN: 20 OINTMENT TOPICAL at 09:30

## 2022-06-22 RX ADMIN — CARBIDOPA AND LEVODOPA 1 TABLET: 25; 100 TABLET ORAL at 17:21

## 2022-06-22 RX ADMIN — PROPAFENONE HYDROCHLORIDE 150 MG: 150 TABLET, FILM COATED ORAL at 06:18

## 2022-06-22 RX ADMIN — IPRATROPIUM BROMIDE AND ALBUTEROL SULFATE 1 AMPULE: 2.5; .5 SOLUTION RESPIRATORY (INHALATION) at 19:18

## 2022-06-22 RX ADMIN — FUROSEMIDE 5 MG/HR: 10 INJECTION, SOLUTION INTRAVENOUS at 16:05

## 2022-06-22 RX ADMIN — MIDAZOLAM HYDROCHLORIDE 2 MG: 1 INJECTION, SOLUTION INTRAMUSCULAR; INTRAVENOUS at 18:45

## 2022-06-22 RX ADMIN — LAMOTRIGINE 50 MG: 25 TABLET ORAL at 21:13

## 2022-06-22 RX ADMIN — MIDAZOLAM HYDROCHLORIDE 2 MG: 1 INJECTION, SOLUTION INTRAMUSCULAR; INTRAVENOUS at 00:57

## 2022-06-22 RX ADMIN — SODIUM CHLORIDE, PRESERVATIVE FREE 10 ML: 5 INJECTION INTRAVENOUS at 21:08

## 2022-06-22 RX ADMIN — Medication 0.6 MCG/KG/HR: at 22:52

## 2022-06-22 RX ADMIN — APIXABAN 2.5 MG: 5 TABLET, FILM COATED ORAL at 21:08

## 2022-06-22 RX ADMIN — LAMOTRIGINE 50 MG: 25 TABLET ORAL at 09:41

## 2022-06-22 RX ADMIN — CEFEPIME HYDROCHLORIDE 1000 MG: 1 INJECTION, POWDER, FOR SOLUTION INTRAMUSCULAR; INTRAVENOUS at 21:30

## 2022-06-22 RX ADMIN — Medication 15 ML: at 09:38

## 2022-06-22 RX ADMIN — CARBIDOPA AND LEVODOPA 1 TABLET: 25; 100 TABLET ORAL at 09:38

## 2022-06-22 RX ADMIN — ALBUMIN (HUMAN) 25 G: 0.25 INJECTION, SOLUTION INTRAVENOUS at 22:39

## 2022-06-22 RX ADMIN — MONTELUKAST SODIUM 10 MG: 10 TABLET, COATED ORAL at 09:39

## 2022-06-22 RX ADMIN — IPRATROPIUM BROMIDE AND ALBUTEROL SULFATE 1 AMPULE: 2.5; .5 SOLUTION RESPIRATORY (INHALATION) at 07:08

## 2022-06-22 RX ADMIN — PROPAFENONE HYDROCHLORIDE 150 MG: 150 TABLET, FILM COATED ORAL at 21:14

## 2022-06-22 RX ADMIN — IPRATROPIUM BROMIDE AND ALBUTEROL SULFATE 1 AMPULE: 2.5; .5 SOLUTION RESPIRATORY (INHALATION) at 11:36

## 2022-06-22 RX ADMIN — ALBUMIN (HUMAN) 25 G: 0.25 INJECTION, SOLUTION INTRAVENOUS at 15:55

## 2022-06-22 RX ADMIN — Medication 2 PUFF: at 07:08

## 2022-06-22 RX ADMIN — DULOXETINE HYDROCHLORIDE 60 MG: 60 CAPSULE, DELAYED RELEASE ORAL at 09:39

## 2022-06-22 RX ADMIN — FAMOTIDINE 20 MG: 10 INJECTION INTRAVENOUS at 09:38

## 2022-06-22 RX ADMIN — Medication 15 ML: at 21:10

## 2022-06-22 ASSESSMENT — PULMONARY FUNCTION TESTS
PIF_VALUE: 23
PIF_VALUE: 24
PIF_VALUE: 23
PIF_VALUE: 24
PIF_VALUE: 24
PIF_VALUE: 23
PIF_VALUE: 24
PIF_VALUE: 10
PIF_VALUE: 24
PIF_VALUE: 23
PIF_VALUE: 20
PIF_VALUE: 24
PIF_VALUE: 26

## 2022-06-22 NOTE — CARE COORDINATION
INTERDISCIPLINARY PLAN OF CARE CONFERENCE    Date/Time: 6/22/2022 9:12 AM  Completed by: Mayte Babb RN, Case Management      Patient Name:  Mitch Ronquillo  YOB: 1940  Admitting Diagnosis: Shortness of breath [R06.02]  Septicemia ([facility] [address] [A41.9]  Chronic anemia [D64.9]  Acute respiratory failure with hypoxia ([facility] [address] [J96.01]  Aspiration pneumonia of left lower lobe, unspecified aspiration pneumonia type ([facility] [address] [J69.0]  Chronic kidney disease, unspecified CKD stage [N18.9]     Admit Date/Time:  6/17/2022 12:50 AM    Chart reviewed. Interdisciplinary team contacted or reviewed plan related to patient progress and discharge plans. Disciplines included Case Management, Nursing, and Dietitian. Current Status:ongoing  PT/OT recommendation for discharge plan of care: will need it when appropriate    Expected D/C Disposition:  Home  Confirmed plan with patient and/or family Yes confirmed with: (name) pt spous  Met with:pt's spouse  Discharge Plan Comments: Reviewed chart. Role of discharge planner explained and patient verbalized understanding. Pt is form home with spouse and plans to return. Spouse provides 24/7 care. Pt will need a PT/OT consult when appropriate. Pt remains in ICU and on vent. Will have  SBT today. Pt will have a nephro consult today. [facility] Pt has BiPAP qhs at home. Home O2 in place on admit: Yes  Pt informed of need to bring portable home O2 tank on day of discharge for nursing to connect prior to leaving:  Yes  Verbalized agreement/Understanding:   Yes

## 2022-06-22 NOTE — PROGRESS NOTES
Results for Jabari Arreguin" (MRN [id number]) as of 6/22/2022 11:30   Ref. Range 6/22/2022 10:38   pH, Arterial Latest Ref Range: 7.350 - 7.450  7.289 (L)   pCO2, Arterial Latest Ref Range: 35.0 - 45.0 mmHg 44.3   pO2, Arterial Latest Ref Range: 75.0 - 108.0 mmHg 84.0   HCO3, Arterial Latest Ref Range: 21.0 - 29.0 mmol/L 20.8 (L)     Dr. Violet Mascorro made aware, stated to remain intubated today and continue use of precedex without propofol and fentanyl if pt tolerates.

## 2022-06-22 NOTE — PROGRESS NOTES
Shift assessment completed, see flow sheet. RASS:  -1. Following simple commands. CPOT: 0  GTTs: Fentanyl at 25 mcg/hr, propofol at 20 mcg/kg/min    Intubated and sedated on AC # 8 ETT, at 23 LL. 20 / 380 / 35 %/ +5. SpO2 97%. Respirations are easy, even, and unlabored. Bilateral lung sounds diminished with expiratory wheezes. VSS  OG 14 fr in place at 75 cm LL, with TF at goal of 35 mL/hr and water flushes 30 ml q 4 hr,  15 ml residual.      PICC/PIV, WNL with all open ports with CHG caps. All lines and monitoring devices in place. Zimmerman is patent and secured. Bilateral soft wrist restraints in place for patient safety. Bed in lowest position with wheels locked. No needs identified at this time. Will continue to monitor.

## 2022-06-22 NOTE — PROGRESS NOTES
PS decraesed to 5/5, FiO2 35%         06/22/22 0909   NICU Vent Information   Vt (Set, mL) 0 mL   Vt Exhaled 267 mL   Resp Rate (Set) 0 bmp   Rate Measured 24 br/min   Minute Volume 6.67 Liters   Peak Flow 0 L/min   Pressure Support 5 cmH20   FiO2  35 %   Peak Inspiratory Pressure 10 cmH2O   I:E Ratio 1:1.70   Sensitivity 2   High Peep/I Pressure 0   PEEP/CPAP (cmH2O) 5   I Time/ I Time % 0 s   Mean Airway Pressure 7.2 cmH20   Additional Respiratory Assessments   Heart Rate 91   Resp 23   SpO2 98 %   Vent Alarm Settings   High Pressure  40 cmH2O   Low Minute Volume Alarm 4 L/min   RR High 40 br/min

## 2022-06-22 NOTE — PROGRESS NOTES
Admit: 2022    Name:  Kiersten Hayden  Room:  [address] [place] [address]  MRN:    [id number]    Critical Care Daily Progress Note for 2022     Admitted with respiratory failure    Interval History:     Transfered to the ICU on  for worsening respiratory status and increasing oxygen requirement. Placed on Vapotherm. Currently on 100% 40 L in addition to a nonrebreather      Patient had to be intubated yesterday because of persistent hypoxia on Vapotherm. Underwent bronc this morning. Bloody secretions +      Patient currently awake, on SBT-low volumes      SBT again today.   Mild agitation overnight. Placed on Precedex. Received Lasix yesterday.   SBT again today      Scheduled Meds:   cefepime  1,000 mg IntraVENous Q12H    famotidine (PEPCID) injection  20 mg IntraVENous Daily    ipratropium-albuterol  1 ampule Inhalation Q4H    lidocaine 1 % injection  5 mL IntraDERmal Once    mupirocin   Nasal BID    chlorhexidine  15 mL Mouth/Throat BID    sodium chloride flush  10 mL IntraVENous 2 times per day    apixaban  2.5 mg Oral BID    atorvastatin  40 mg Oral Nightly    carbidopa-levodopa  1 tablet Oral 4x Daily    DULoxetine  60 mg Oral Daily    budesonide-formoterol  2 puff Inhalation BID    lamoTRIgine  50 mg Oral BID    montelukast  10 mg Oral Daily    propafenone  150 mg Oral 3 times per day       Continuous Infusions:   dexmedetomidine HCl in NaCl 0.3 mcg/kg/hr (22 1150)    fentaNYL Stopped (22 0916)    sodium chloride      propofol Stopped (22 1134)    sodium chloride         PRN Meds:  carboxymethylcellulose PF, fentaNYL **AND** fentaNYL, sodium chloride (Inhalant), albuterol, sodium chloride, midazolam, fentanNYL, sodium chloride flush, sodium chloride, potassium chloride, magnesium sulfate, promethazine **OR** ondansetron, acetaminophen **OR** acetaminophen, albuterol                  Objective:     Temp  Av.2 °F (37.3 °C)  Min: 98.9 °F (37.2 °C)  Max: 99.6 °F (37.6 °C)  Pulse  Av.1  Min: 89  Max: 109  BP  Min: 92/37  Max: 153/124  SpO2  Av %  Min: 91 %  Max: 99 %  FiO2   Av %  Min: 35 %  Max: 35 %  Patient Vitals for the past 4 hrs:   BP Temp Temp src Pulse Resp SpO2   22 1235 110/84 -- -- 92 20 98 %   22 1230 (!) 92/37 -- -- 92 20 98 %   22 1200 (!) 95/43 -- -- 93 20 99 %   22 1137 -- -- -- 89 24 98 %   22 1136 -- -- -- 90 25 97 %   22 1100 136/84 98.9 °F (37.2 °C) Bladder 90 26 --   22 1000 (!) 121/49 -- -- 89 24 96 %         Intake/Output Summary (Last 24 hours) at 2022 1315  Last data filed at 2022 1150  Gross per 24 hour   Intake 2109.91 ml   Output 1400 ml   Net 709.91 ml       Physical Exam:    General Appearance: Intubated, awake on SBT  Eyes: pupils equal, round, and reactive to light, extraocular eye movements intact, conjunctivae normal and sclera anicteric   Neck: neck supple and non tender without mass, no thyromegaly, no thyroid nodules and no cervical adenopathy   Pulmonary/Chest: Accessory muscle use plus. No wheezing.   Bilateral rhonchi  Cardiovascular: normal rate, regular rhythm, normal S1 and S2, no murmurs, rubs, clicks or gallops, distal pulses intact, no carotid bruits, no murmurs, no gallops, no carotid bruits and no JVD   Abdomen: obese, soft, non-tender, non-distended, normal bowel sounds, no masses or organomegaly   Extremities: No edema or cyanosis  Musculoskeletal: normal range of motion, no joint swelling, deformity or tenderness   Neurologic: Awake    Lab Data:  CBC:   Recent Labs     22  0440 22  0430 22  0555   WBC 13.3* 11.1* 10.3   RBC 2.58* 2.77* 2.70*   HGB 8.0* 7.8* 7.6*   HCT 22.3* 23.9* 23.4*   MCV 86.6 86.5 86.5   RDW 14.0 14.0 14.1    282 261     BMP:   Recent Labs     22  0440 22  0430 22  0555   * 136 138   K 3.7 3.6 3.8    104 106   CO2 20* 20* 19*   BUN 31* 37* 42*   CREATININE 1.8* 1.9* 2.2*     BNP: No results for input(s): BNP in the last 72 hours. PT/INR:   No results for input(s): PROTIME, INR in the last 72 hours. APTT:No results for input(s): APTT in the last 72 hours. CARDIAC ENZYMES:   No results for input(s): CKMB, CKMBINDEX, TROPONINI in the last 72 hours. Invalid input(s): CKTOTAL;3  FASTING LIPID PANEL:  Lab Results   Component Value Date    CHOL 147 09/27/2021    HDL 49 09/27/2021    HDL 72 12/20/2011    TRIG 194 09/27/2021     LIVER PROFILE:   No results for input(s): AST, ALT, ALB, BILIDIR, BILITOT, ALKPHOS in the last 72 hours. XR CHEST PORTABLE   Preliminary Result   Stable examination with bilateral small effusions and bibasilar atelectasis. Stable ET tube and enteric tube. XR CHEST PORTABLE   Final Result   No acute process. Bibasilar hypoaeration      Stable cardiomegaly         IR PICC WO SQ PORT/PUMP > 5 YEARS   Final Result      XR CHEST PORTABLE   Final Result   Supportive tubing projects in stable position. Partial regression of atelectasis in the right lung base. Persisting   consolidation or or atelectasis in the left lung base. XR CHEST PORTABLE   Final Result   1. Endotracheal tube terminates in appropriate position above the nirali. 2.  Enteric tube terminates in the body of the stomach. 3.  Basilar opacities again demonstrated, right greater than left. Interval   improved aeration of the left lung base. XR CHEST PORTABLE   Final Result   Increasing opacity in the right base, lobar atelectasis versus accumulating   right pleural effusion. Combination of both entities or underlying pneumonia   also possible. Persisting left basilar airspace disease and small pleural effusion. XR CHEST PORTABLE   Final Result   Hazy bilateral lobe airspace opacities and small left pleural effusion. This   could represent atelectasis or pneumonia in the appropriate clinical setting.          CT CHEST WO CONTRAST Final Result   1. Respiratory motion in the lower lungs. The right base has linear and   dependent atelectasis. The left does have atelectatic changes but could also   have some patchy opacities from pneumonia or aspiration at the left base. 2.  There is no pleural effusion or pneumothorax. 3.  Narrowing of the AP diameter of the trachea with bulging of the   membranous portion due to the gas distended but nondilated esophagus. XR CHEST PORTABLE   Final Result   1. Bibasilar heterogeneous opacities may represent subsegmental atelectasis   and/or mild pulmonary edema. Underlying infection is difficult to exclude. Short-term follow-up PA and lateral chest radiographs may be helpful for   further evaluation. 2.  Cardiomegaly.          FL ESOPHAGRAM    (Results Pending)   XR CHEST PORTABLE    (Results Pending)         Assessment & Plan:     Patient Active Problem List    Diagnosis Date Noted    Shortness of breath 06/17/2022    Aspiration pneumonia of left lower lobe (HCC)     Sensorineural hearing loss, bilateral 04/07/2022    Laceration of left lower extremity     Pulmonary nodules     C. difficile colitis 10/07/2021    Chest pain 09/27/2021    Pulmonary HTN ([address]     Mitral valve insufficiency     Sialadenitis     Colonic pseudoobstruction     Chronic kidney disease 03/01/2020    Partial symptomatic epilepsy with complex partial seizures, not intractable, without status epilepticus ([address] 30/27/8454    Diastolic congestive heart failure ([address] 01/17/2019    Acute respiratory failure with hypoxia (HCC)     COPD (chronic obstructive pulmonary disease) ([address] 12/10/2018    PAF (paroxysmal atrial fibrillation) (HCC)     Septicemia ([address] 09/08/2018    Severe obstructive sleep apnea     Hepatic steatosis     Diverticulosis     Blind left eye     Parkinson disease ([address] 05/22/2018    CAD (coronary artery disease)     Morbid obesity with BMI of 40.0-44.9, adult ([address] 02/10/2017    Type 2 diabetes mellitus with stage 4 chronic kidney disease, without long-term current use of insulin ([address] 02/10/2017    Moderate persistent asthma 08/09/2016    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-sided sciatica 08/09/2016    Hypothyroidism 01/27/2015    Postural dizziness 02/20/2013    Anemia of chronic disease     GERD (gastroesophageal reflux disease)     Essential hypertension     Hyperlipidemia     Restless leg syndrome     L carotid artery stenosis s/p CEA 01/01/2002            Acute Hypox Resp Failure. Aspiration PNA. Oxygen requirements increased rapidly from 2l/min to NRB to vapotherm  Currently 100% 40 L on Vapotherm in addition to a nonrebreather. Might have to be intubated. Check Resp Culture. Add 3% saline neb. Continue IV Zosyn and vancomycin. Change Zosyn to cefepime. Day #6. DC vancomycin 6/22  Intubated 6/18. SBT again today. Lasix 40 mg IV x2 yesterday with poor response.      Sepsis POA - due to above.       Dysphagia. Abnormal CT  Cough with every meal intake per family report. Underlying Parkinson's. I ordered for MBS and esophagram, though these will unlikely be done till she is better clinically. Currently on tube feeds      CKD IIIB  Cr slight better then baseline actually. Clinically does not appear fluid overloaded. DC IV fluids  Lasix 40 mg IV x2 6/21-with poor response. Worsening creatinine. Consult nephrology.     Parkinson's on Sinemet.       Afib Paroxysmal.   Rythmol   Hold Eliquis given bloody secretions during bronch.      HTN - BP on the low side. Nifedipine stopped due to this and also leg edema.    Consider alternative when restarting.         DVT Prophylaxis: eliquis held for bloody secretions seen during [facility]  Diet:  Tube feeds  Code Status: Full Code    George Jones MD

## 2022-06-22 NOTE — PROGRESS NOTES
Pulmonary & Critical Care Medicine ICU Progress Note    CC: shortness of breath     Events of Last 24 hours:   Mild agitation overnight  Received Lasix x2 yesterday    Vascular lines: IV:   2022 PICC    MV: 2022 after failing Vapotherm    Resp Rate (Set): 20 bmp/Vt (Set, mL): 380 mL/ /FiO2 : 35 %  Recent Labs     22  0430 22  0535   PHART 7.309* 7.307*   FTZ6VKS 39.2 38.4   PO2ART 54.8* 82.2       IV:   dexmedetomidine HCl in NaCl 0.2 mcg/kg/hr (22)    fentaNYL 25 mcg/hr (22)    sodium chloride      propofol 15 mcg/kg/min (22)    sodium chloride         Vitals:  Blood pressure (!) 122/108, pulse (!) 102, temperature 99.2 °F (37.3 °C), temperature source Bladder, resp. rate 20, height 5' 6\" (1.676 m), weight 249 lb 1.6 oz (113 kg), SpO2 98 %, not currently breastfeeding. on vent  Temp  Av.4 °F (37.4 °C)  Min: 99.1 °F (37.3 °C)  Max: 99.6 °F (37.6 °C)    Intake/Output Summary (Last 24 hours) at 2022 7077  Last data filed at 2022 2321  Gross per 24 hour   Intake 1872.71 ml   Output 1050 ml   Net 822.71 ml     PE:  General: intubated, ill appearing    ENT: Pharynx with ETT. Resp: No crackles. No wheezing. CV: S1, S2. + edema  GI: NT, ND, +BS  Skin: Warm and dry. Neuro: PERRL. Awake and following commands.  Patellar reflexes are symmetric      Scheduled Meds:   vancomycin  1,000 mg IntraVENous Q24H    famotidine (PEPCID) injection  20 mg IntraVENous Daily    cefepime  2,000 mg IntraVENous Q12H    ipratropium-albuterol  1 ampule Inhalation Q4H    lidocaine 1 % injection  5 mL IntraDERmal Once    mupirocin   Nasal BID    chlorhexidine  15 mL Mouth/Throat BID    sodium chloride flush  10 mL IntraVENous 2 times per day    apixaban  2.5 mg Oral BID    atorvastatin  40 mg Oral Nightly    carbidopa-levodopa  1 tablet Oral 4x Daily    DULoxetine  60 mg Oral Daily    budesonide-formoterol  2 puff Inhalation BID    lamoTRIgine  50 mg Oral BID    montelukast  10 mg Oral Daily    propafenone  150 mg Oral 3 times per day    vancomycin (VANCOCIN) intermittent dosing (placeholder)   Other RX Placeholder       Data:  CBC:   Recent Labs     06/20/22  0440 06/21/22  0430 06/22/22  0555   WBC 13.3* 11.1* 10.3   HGB 8.0* 7.8* 7.6*   HCT 22.3* 23.9* 23.4*   MCV 86.6 86.5 86.5    282 261     BMP:   Recent Labs     06/20/22  0440 06/21/22  0430   * 136   K 3.7 3.6    104   CO2 20* 20*   BUN 31* 37*   CREATININE 1.8* 1.9*     LIVER PROFILE:   No results for input(s): AST, ALT, LIPASE, BILIDIR, BILITOT, ALKPHOS in the last 72 hours. Invalid input(s): AMYLASE,  ALB    Microbiology:  6/17/2022 SARS-CoV-2 and influenza are negative  6/17/2022 MRSA DNA screen positive  6/17/2022 blood NG  6/17/2022 sputum NRF  6/18/2022 tracheal aspirate NRF  6/19/2022 BAL no growth  6/19/2022 respiratory viral panel negative    Imaging:    ASSESSMENT:  · Acute hypoxemic respiratory failure  · Aspiration pneumonia  · BETSY on BiPAP 22/15 with 1 L bleed in   · Parkinson's disease  · Atrial fibrillation  · Chronic kidney failure, baseline creatinine 1.6    PLAN:  Mechanical ventilation as per my orders. The ventilator was adjusted by me at the bedside for unstable, life threatening respiratory failure. IV Propofol for sedation, target RASS -2, with daily spontaneous awakening trial.  Fentanyl gtt as needed.   Plan on starting low dose precedex in am for breathing trial.   Head of bed 30 degrees or higher at all times  · Daily spontaneous breathing trial   · Antibiotic day #6, now cefepime and vancomycin, will d/c vancomycin today   · Inhaled bronchodilators  · Eliquis   · Received lasix X 2 yesterday, relatively poor UOP -- consult nephrology with worsening creatinine    · Prophylaxis: SCD, Bactroban, famotidine  · D/W spouse at bedside     Total critical care time caring for this patient with life threatening, unstable organ failure, including direct patient contact, management of life support systems, review of data including imaging and labs, discussions with other team members and physicians is 35 minutes so far today, excluding procedures.

## 2022-06-22 NOTE — CONSULTS
[facility] Consult Note           Reason for Consult:  Acute kidney injury  Requesting Physician:  Dr. Roberta Edmondson    Chief Complaint:  Shortness of breath  History Obtained From:  patient, electronic medical record    History of Present Ilness:    80year old female with CKD3b, dCHF, HTN, DM, BETSY, Afib, Parkison's disease admitted with shortness of breath, cough. We have been asked to assist in further mgmt of her FARHEEN. Presented with worsening shortness of breath, hypoxia, requiring NRB, now on ventilator for aspiration PNA. SCr baseline 1.6, up to 2.2 today, urine output of 1.1 liters over last 24 hours with two doses of IV lasix. BP's on lower end. Positive ~10 liters since admission. Started on vancomycin/cefepime for aspiration PNA, vanc level 27 today. +weak, intake reduced, no fevers.     Past Medical History:        Diagnosis Date    Anemia, unspecified 2010    neg bone marrow    Asthma     Atrial fibrillation ([address]     Baker's cyst     Basal cell carcinoma of left cheek 3/12/2019    Blind left eye     Carotid artery stenoses     Carotid stenosis 2002    left    Cervical spinal cord compression ([address] 11/2010    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-sided sciatica 8/9/2016    CKD (chronic kidney disease) stage 3, GFR 30-59 ml/min ([facility])     Community acquired pneumonia of left lower lobe of lung 9/8/2018    CRI     Detached retina, left     Diverticulosis     DJD (degenerative joint disease)     Edema     chronic    Fatty liver     GERD (gastroesophageal reflux disease)     Hearing loss     Herniated lumbar intervertebral disc     Hx stage 2 sacral decubitus ulcer 4/13/2018    Hyperlipidemia     Hypertension     Hypothyroid 1/27/2015    Morbid obesity with BMI of 40.0-44.9, adult ([address] 2/10/2017    BETSY treated with BiPAP     Parkinson's disease ([address]     Partial symptomatic epilepsy with complex partial seizures, not intractable, without status epilepticus [facility] 1/24/2020    Restless leg syndrome     Restless legs syndrome     Rheumatic fever 1950    2x IN [place]    Sleep apnea     Tremor, essential 8/16/2010    Type 2 diabetes mellitus without complication [facility]     Type II or unspecified type diabetes mellitus without mention of complication, not stated as uncontrolled        Past Surgical History:        Procedure Laterality Date    APPENDECTOMY      CAROTID ENDARTERECTOMY      left    CATARACT REMOVAL      CERVICAL FUSION  11/2010    CHOLECYSTECTOMY      COLONOSCOPY N/A 3/8/2020    COLONOSCOPY FLEXIBLE W/ DECOMPRESSION performed by Tanner Lovett MD at [address] (CERVIX STATUS UNKNOWN)      IR MIDLINE CATH  10/8/2021    IR MIDLINE CATH 10/8/2021 [address] SPECIAL PROCEDURES    JOINT REPLACEMENT Bilateral 2003    KNEE SURGERY      replacement x 2    OVARY REMOVAL      SHOULDER SURGERY      TONSILLECTOMY      TUBAL LIGATION      UPPER GASTROINTESTINAL ENDOSCOPY  03/17/2020    Esophagitis    UPPER GASTROINTESTINAL ENDOSCOPY N/A 3/17/2020    EGD performed by Lynette Mckay MD at [facility] Medications:    No current facility-administered medications on file prior to encounter. Current Outpatient Medications on File Prior to Encounter   Medication Sig Dispense Refill    lidocaine (XYLOCAINE) 5 % ointment Apply topically every 3 hours as needed for Pain Apply topically as needed.       montelukast (SINGULAIR) 10 MG tablet TAKE 1 TABLET EVERY DAY 90 tablet 1    apixaban (ELIQUIS) 2.5 MG TABS tablet Take 1 tablet by mouth 2 times daily 180 tablet 3    valsartan (DIOVAN) 80 MG tablet Take 1 tablet by mouth daily      albuterol sulfate  (90 Base) MCG/ACT inhaler INHALE 2 PUFFS INTO THE LUNGS EVERY 6 HOURS AS NEEDED FOR WHEEZING 3 each 2    furosemide (LASIX) children: 2    Years of education: 15    Highest education level: Not on file   Occupational History    Not on file   Tobacco Use    Smoking status: Former Smoker    Smokeless tobacco: Never Used   Vaping Use    Vaping Use: Never used   Substance and Sexual Activity    Alcohol use: No    Drug use: No    Sexual activity: Not Currently   Other Topics Concern    Not on file   Social History Narrative    ** Merged History Encounter **          Social Determinants of Health     Financial Resource Strain:     Difficulty of Paying Living Expenses: Not on file   Food Insecurity:     Worried About Running Out of Food in the Last Year: Not on file    Deja of Food in the Last Year: Not on file   Transportation Needs:     Lack of Transportation (Medical): Not on file    Lack of Transportation (Non-Medical):  Not on file   Physical Activity:     Days of Exercise per Week: Not on file    Minutes of Exercise per Session: Not on file   Stress:     Feeling of Stress : Not on file   Social Connections:     Frequency of Communication with Friends and Family: Not on file    Frequency of Social Gatherings with Friends and Family: Not on file    Attends Holiness Services: Not on file    Active Member of Clubs or Organizations: Not on file    Attends Club or Organization Meetings: Not on file    Marital Status: Not on file   Intimate Partner Violence:     Fear of Current or Ex-Partner: Not on file    Emotionally Abused: Not on file    Physically Abused: Not on file    Sexually Abused: Not on file   Housing Stability: [address] Unable to Pay for Housing in the Last Year: No    Number of Places Lived in the Last Year: 1    Unstable Housing in the Last Year: No       Family History:   Family History   Problem Relation Age of Onset    Diabetes Mother     Heart Disease Mother     Diabetes Father     Heart Disease Father     Diabetes Sister        Review of Systems:   Pertinent positives stated above in HPI. Remainder of 10 point review of systems were reviewed and were negative.     Physical exam:   Constitutional:  VITALS:  BP (!) 92/37   Pulse 92   Temp 98.9 °F (37.2 °C) (Bladder)   Resp 20   Ht 5' 6\" (1.676 m)   Wt 249 lb 1.6 oz (113 kg)   SpO2 98%   BMI 40.21 kg/m²   Gen: alert, awake, ill-appearing  Skin: no rash, turgor wnl  Heent:  eomi, mmm  Neck: no bruits or jvd noted, thyroid normal  Cardiovascular:  S1, S2 without m/r/g  Respiratory: CTA B without w/r/r; mechanically ventilated  Abdomen:  +bs, soft, nt, nd, no hepatosplenomegaly  Ext: +++ lower extremity edema  Psychiatric: mood and affect limited; judgement and insight limited  Musculoskeletal:  Rom, muscular strength limited; digits, nails normal    Data/  CBC:   Lab Results   Component Value Date    WBC 10.3 06/22/2022    RBC 2.70 06/22/2022    HGB 7.6 06/22/2022    HCT 23.4 06/22/2022    MCV 86.5 06/22/2022    MCH 28.0 06/22/2022    MCHC 32.4 06/22/2022    RDW 14.1 06/22/2022     06/22/2022    MPV 8.3 06/22/2022     BMP:    Lab Results   Component Value Date     06/22/2022    K 3.8 06/22/2022    K 3.8 06/19/2022     06/22/2022    CO2 19 06/22/2022    BUN 42 06/22/2022    LABALBU 2.3 06/19/2022    CREATININE 2.2 06/22/2022    CALCIUM 6.7 06/22/2022    GFRAA 26 06/22/2022    GFRAA >60 02/21/2013    LABGLOM 21 06/22/2022    GLUCOSE 125 06/22/2022         Assessment/    - Acute kidney injury - multi-factorial ATN injury in setting of aspiration PNA, hypotension, elevated vancomycin levels    - Chronic kidney disease stage 3b - background DM/HTN/CHF   SCr 1.6   Follows with [facility]    - Acute hypoxemic respiratory failure/Aspiration PNA    - Diastolic CHF    - Atrial fibrillation    - Hypertension      Plan/    - Monitoring off of ARB  - Agree with monitoring off of vancomycin if feasible  - Trial of albumin/lasix drip for diuresis  - UA with microscopy, urine electrolytes  - Trend labs, bp's, & urine output      Thank you for the consultation. Please do not hesitate to call with questions. ____________________________________  Esteban Cho MD  [facility]  [email or web address]  Office: [phone]

## 2022-06-22 NOTE — PROGRESS NOTES
Care rounds completed with Dr. Roberta Edmondson and multidisciplinary team. Reviewed labs, meds, VS, assessment, & plan of care for today. See dictated note and new orders for details.      Consult nephrology  1 hour SBT on 5/5 then ABG and will decide on extubation  DC vanc

## 2022-06-22 NOTE — PROGRESS NOTES
Wasted 15ml Fentanyl  With Mei Allen RN   Electronically signed by John Zuluaga RN on 22 at 5:08 PM EDT    Wasted 15ml fentanyl from fentanyl gtt with Bernard Mitchell RN  Electronically signed by Mei Allen RN on 2022 at 5:09 PM

## 2022-06-22 NOTE — PROGRESS NOTES
1060: Bed bath completed. Patient tolerated well with no issues noted. She was repositioned up in bed with assistance of other staff and pillow support. All lines and tubing remain in good placement. 616: Precedex infusion started at order rate of 0.2 mcg/kg/hr.  Tube feed placed on hold in preparation for SBT.    0715: Report to on cominig shift RNs and care of patient transferred    Electronically signed by Maricarmen Orosco RN on 6/22/2022 at 7:27 AM

## 2022-06-22 NOTE — PROGRESS NOTES
6205: Patient biting ETT causing ventilator to alarm and preventing air flow. Also observed patient with tremors to left arm. When asking the patient if she wants something to help her relax she nodded her head to indicate \"yes\". Medicate with versed 2 mg IVP. 0110: Patient is more calm and resting more quietly.     Electronically signed by Shu Allen RN on 6/22/2022 at 4:17 AM

## 2022-06-22 NOTE — PROGRESS NOTES
Pt placed on SBT 10/5, FiO2 35%     06/22/22 0805   NICU Vent Information   Vt (Set, mL) 380 mL   Vt Exhaled 336 mL   Resp Rate (Set) 20 bmp   Rate Measured 20 br/min   Minute Volume 7.36 Liters   Peak Flow 50 L/min   Pressure Support 0 cmH20   FiO2  35 %   Peak Inspiratory Pressure 23 cmH2O   I:E Ratio 1:2.30   Sensitivity 3   High Peep/I Pressure 0   PEEP/CPAP (cmH2O) 5   I Time/ I Time % 0 s   Mean Airway Pressure 10 cmH20   Additional Respiratory Assessments   Heart Rate 90   Resp 20   SpO2 95 %   Vent Alarm Settings   High Pressure  40 cmH2O   Low Minute Volume Alarm 4 L/min   RR High 40 br/min

## 2022-06-23 ENCOUNTER — APPOINTMENT (OUTPATIENT)
Dept: CT IMAGING | Age: 82
DRG: 004 | End: 2022-06-23
Payer: MEDICARE

## 2022-06-23 ENCOUNTER — APPOINTMENT (OUTPATIENT)
Dept: GENERAL RADIOLOGY | Age: 82
DRG: 004 | End: 2022-06-23
Payer: MEDICARE

## 2022-06-23 LAB
ABO/RH: NORMAL
ALBUMIN SERPL-MCNC: 3 G/DL (ref 3.4–5)
ALP BLD-CCNC: 66 U/L (ref 40–129)
ALT SERPL-CCNC: <5 U/L (ref 10–40)
ANION GAP SERPL CALCULATED.3IONS-SCNC: 12 MMOL/L (ref 3–16)
ANTIBODY SCREEN: NORMAL
AST SERPL-CCNC: 14 U/L (ref 15–37)
BASE EXCESS ARTERIAL: -8.6 MMOL/L (ref -3–3)
BASOPHILS ABSOLUTE: 0 K/UL (ref 0–0.2)
BASOPHILS RELATIVE PERCENT: 0.1 %
BILIRUB SERPL-MCNC: <0.2 MG/DL (ref 0–1)
BILIRUBIN DIRECT: <0.2 MG/DL (ref 0–0.3)
BILIRUBIN, INDIRECT: ABNORMAL MG/DL (ref 0–1)
BLOOD BANK DISPENSE STATUS: NORMAL
BLOOD BANK DISPENSE STATUS: NORMAL
BLOOD BANK PRODUCT CODE: NORMAL
BLOOD BANK PRODUCT CODE: NORMAL
BPU ID: NORMAL
BPU ID: NORMAL
BUN BLDV-MCNC: 49 MG/DL (ref 7–20)
CALCIUM SERPL-MCNC: 6.6 MG/DL (ref 8.3–10.6)
CARBOXYHEMOGLOBIN ARTERIAL: 0.2 % (ref 0–1.5)
CHLORIDE BLD-SCNC: 103 MMOL/L (ref 99–110)
CO2: 19 MMOL/L (ref 21–32)
CREAT SERPL-MCNC: 2.6 MG/DL (ref 0.6–1.2)
DESCRIPTION BLOOD BANK: NORMAL
DESCRIPTION BLOOD BANK: NORMAL
EOSINOPHILS ABSOLUTE: 0.3 K/UL (ref 0–0.6)
EOSINOPHILS RELATIVE PERCENT: 3.1 %
GFR AFRICAN AMERICAN: 21
GFR NON-AFRICAN AMERICAN: 18
GLUCOSE BLD-MCNC: 126 MG/DL (ref 70–99)
GLUCOSE BLD-MCNC: 127 MG/DL (ref 70–99)
GLUCOSE BLD-MCNC: 131 MG/DL (ref 70–99)
GLUCOSE BLD-MCNC: 139 MG/DL (ref 70–99)
GLUCOSE BLD-MCNC: 146 MG/DL (ref 70–99)
GLUCOSE BLD-MCNC: 152 MG/DL (ref 70–99)
GLUCOSE BLD-MCNC: 161 MG/DL (ref 70–99)
HCO3 ARTERIAL: 16.8 MMOL/L (ref 21–29)
HCT VFR BLD CALC: 20.6 % (ref 36–48)
HCT VFR BLD CALC: 27.6 % (ref 36–48)
HEMOGLOBIN, ART, EXTENDED: 7.1 G/DL (ref 12–16)
HEMOGLOBIN: 6.6 G/DL (ref 12–16)
HEMOGLOBIN: 9.2 G/DL (ref 12–16)
LYMPHOCYTES ABSOLUTE: 0.7 K/UL (ref 1–5.1)
LYMPHOCYTES RELATIVE PERCENT: 7.5 %
MAGNESIUM: 1.8 MG/DL (ref 1.8–2.4)
MAGNESIUM: 1.9 MG/DL (ref 1.8–2.4)
MCH RBC QN AUTO: 27.5 PG (ref 26–34)
MCHC RBC AUTO-ENTMCNC: 32.1 G/DL (ref 31–36)
MCV RBC AUTO: 85.9 FL (ref 80–100)
METHEMOGLOBIN ARTERIAL: 0.1 %
MONOCYTES ABSOLUTE: 0.9 K/UL (ref 0–1.3)
MONOCYTES RELATIVE PERCENT: 8.7 %
NEUTROPHILS ABSOLUTE: 8 K/UL (ref 1.7–7.7)
NEUTROPHILS RELATIVE PERCENT: 80.6 %
O2 SAT, ARTERIAL: 94.8 %
O2 THERAPY: ABNORMAL
OCCULT BLOOD DIAGNOSTIC: ABNORMAL
PCO2 ARTERIAL: 33.8 MMHG (ref 35–45)
PDW BLD-RTO: 13.8 % (ref 12.4–15.4)
PERFORMED ON: ABNORMAL
PH ARTERIAL: 7.31 (ref 7.35–7.45)
PLATELET # BLD: 238 K/UL (ref 135–450)
PMV BLD AUTO: 8.3 FL (ref 5–10.5)
PO2 ARTERIAL: 78.6 MMHG (ref 75–108)
POTASSIUM SERPL-SCNC: 3.2 MMOL/L (ref 3.5–5.1)
POTASSIUM SERPL-SCNC: 3.5 MMOL/L (ref 3.5–5.1)
PRO-BNP: 2121 PG/ML (ref 0–449)
RBC # BLD: 2.4 M/UL (ref 4–5.2)
SODIUM BLD-SCNC: 134 MMOL/L (ref 136–145)
TCO2 ARTERIAL: 17.8 MMOL/L
TOTAL PROTEIN: 5.6 G/DL (ref 6.4–8.2)
WBC # BLD: 9.9 K/UL (ref 4–11)

## 2022-06-23 PROCEDURE — 86850 RBC ANTIBODY SCREEN: CPT

## 2022-06-23 PROCEDURE — 80076 HEPATIC FUNCTION PANEL: CPT

## 2022-06-23 PROCEDURE — 2580000003 HC RX 258: Performed by: INTERNAL MEDICINE

## 2022-06-23 PROCEDURE — P9016 RBC LEUKOCYTES REDUCED: HCPCS

## 2022-06-23 PROCEDURE — 94761 N-INVAS EAR/PLS OXIMETRY MLT: CPT

## 2022-06-23 PROCEDURE — 82607 VITAMIN B-12: CPT

## 2022-06-23 PROCEDURE — 85018 HEMOGLOBIN: CPT

## 2022-06-23 PROCEDURE — 36415 COLL VENOUS BLD VENIPUNCTURE: CPT

## 2022-06-23 PROCEDURE — 85025 COMPLETE CBC W/AUTO DIFF WBC: CPT

## 2022-06-23 PROCEDURE — 83540 ASSAY OF IRON: CPT

## 2022-06-23 PROCEDURE — 6360000002 HC RX W HCPCS: Performed by: INTERNAL MEDICINE

## 2022-06-23 PROCEDURE — 2500000003 HC RX 250 WO HCPCS: Performed by: INTERNAL MEDICINE

## 2022-06-23 PROCEDURE — 83550 IRON BINDING TEST: CPT

## 2022-06-23 PROCEDURE — 82728 ASSAY OF FERRITIN: CPT

## 2022-06-23 PROCEDURE — 84132 ASSAY OF SERUM POTASSIUM: CPT

## 2022-06-23 PROCEDURE — C9113 INJ PANTOPRAZOLE SODIUM, VIA: HCPCS | Performed by: INTERNAL MEDICINE

## 2022-06-23 PROCEDURE — 80048 BASIC METABOLIC PNL TOTAL CA: CPT

## 2022-06-23 PROCEDURE — 99233 SBSQ HOSP IP/OBS HIGH 50: CPT | Performed by: INTERNAL MEDICINE

## 2022-06-23 PROCEDURE — 74176 CT ABD & PELVIS W/O CONTRAST: CPT

## 2022-06-23 PROCEDURE — 86901 BLOOD TYPING SEROLOGIC RH(D): CPT

## 2022-06-23 PROCEDURE — 6370000000 HC RX 637 (ALT 250 FOR IP): Performed by: INTERNAL MEDICINE

## 2022-06-23 PROCEDURE — 86923 COMPATIBILITY TEST ELECTRIC: CPT

## 2022-06-23 PROCEDURE — 2000000000 HC ICU R&B

## 2022-06-23 PROCEDURE — 85014 HEMATOCRIT: CPT

## 2022-06-23 PROCEDURE — 94003 VENT MGMT INPAT SUBQ DAY: CPT

## 2022-06-23 PROCEDURE — P9047 ALBUMIN (HUMAN), 25%, 50ML: HCPCS | Performed by: INTERNAL MEDICINE

## 2022-06-23 PROCEDURE — 36430 TRANSFUSION BLD/BLD COMPNT: CPT

## 2022-06-23 PROCEDURE — 82270 OCCULT BLOOD FECES: CPT

## 2022-06-23 PROCEDURE — 71045 X-RAY EXAM CHEST 1 VIEW: CPT

## 2022-06-23 PROCEDURE — 82746 ASSAY OF FOLIC ACID SERUM: CPT

## 2022-06-23 PROCEDURE — 82803 BLOOD GASES ANY COMBINATION: CPT

## 2022-06-23 PROCEDURE — 83735 ASSAY OF MAGNESIUM: CPT

## 2022-06-23 PROCEDURE — 83880 ASSAY OF NATRIURETIC PEPTIDE: CPT

## 2022-06-23 PROCEDURE — 99291 CRITICAL CARE FIRST HOUR: CPT | Performed by: INTERNAL MEDICINE

## 2022-06-23 PROCEDURE — 94640 AIRWAY INHALATION TREATMENT: CPT

## 2022-06-23 PROCEDURE — 86900 BLOOD TYPING SEROLOGIC ABO: CPT

## 2022-06-23 PROCEDURE — 2700000000 HC OXYGEN THERAPY PER DAY

## 2022-06-23 RX ORDER — SODIUM CHLORIDE 9 MG/ML
INJECTION, SOLUTION INTRAVENOUS PRN
Status: DISCONTINUED | OUTPATIENT
Start: 2022-06-23 | End: 2022-06-24

## 2022-06-23 RX ORDER — PANTOPRAZOLE SODIUM 40 MG/10ML
40 INJECTION, POWDER, LYOPHILIZED, FOR SOLUTION INTRAVENOUS 2 TIMES DAILY
Status: DISCONTINUED | OUTPATIENT
Start: 2022-06-23 | End: 2022-07-19 | Stop reason: HOSPADM

## 2022-06-23 RX ORDER — POTASSIUM BICARBONATE 25 MEQ/1
50 TABLET, EFFERVESCENT ORAL ONCE
Status: COMPLETED | OUTPATIENT
Start: 2022-06-23 | End: 2022-06-23

## 2022-06-23 RX ORDER — CALCIUM GLUCONATE 20 MG/ML
1000 INJECTION, SOLUTION INTRAVENOUS ONCE
Status: COMPLETED | OUTPATIENT
Start: 2022-06-23 | End: 2022-06-23

## 2022-06-23 RX ADMIN — Medication 0.8 MCG/KG/HR: at 15:00

## 2022-06-23 RX ADMIN — ALBUMIN (HUMAN) 25 G: 0.25 INJECTION, SOLUTION INTRAVENOUS at 06:03

## 2022-06-23 RX ADMIN — PANTOPRAZOLE SODIUM 40 MG: 40 INJECTION, POWDER, LYOPHILIZED, FOR SOLUTION INTRAVENOUS at 20:45

## 2022-06-23 RX ADMIN — CALCIUM GLUCONATE 1000 MG: 20 INJECTION, SOLUTION INTRAVENOUS at 06:35

## 2022-06-23 RX ADMIN — PROPAFENONE HYDROCHLORIDE 150 MG: 150 TABLET, FILM COATED ORAL at 06:09

## 2022-06-23 RX ADMIN — SODIUM CHLORIDE, PRESERVATIVE FREE 10 ML: 5 INJECTION INTRAVENOUS at 20:43

## 2022-06-23 RX ADMIN — Medication 2 PUFF: at 08:19

## 2022-06-23 RX ADMIN — IPRATROPIUM BROMIDE AND ALBUTEROL SULFATE 1 AMPULE: 2.5; .5 SOLUTION RESPIRATORY (INHALATION) at 23:23

## 2022-06-23 RX ADMIN — MUPIROCIN: 20 OINTMENT TOPICAL at 08:34

## 2022-06-23 RX ADMIN — DULOXETINE HYDROCHLORIDE 60 MG: 60 CAPSULE, DELAYED RELEASE ORAL at 08:27

## 2022-06-23 RX ADMIN — CARBIDOPA AND LEVODOPA 1 TABLET: 25; 100 TABLET ORAL at 20:49

## 2022-06-23 RX ADMIN — Medication 2 PUFF: at 19:36

## 2022-06-23 RX ADMIN — FENTANYL CITRATE 50 MCG: 50 INJECTION, SOLUTION INTRAMUSCULAR; INTRAVENOUS at 13:14

## 2022-06-23 RX ADMIN — PROPAFENONE HYDROCHLORIDE 150 MG: 150 TABLET, FILM COATED ORAL at 20:55

## 2022-06-23 RX ADMIN — FENTANYL CITRATE 50 MCG: 50 INJECTION, SOLUTION INTRAMUSCULAR; INTRAVENOUS at 08:13

## 2022-06-23 RX ADMIN — ATORVASTATIN CALCIUM 40 MG: 40 TABLET, FILM COATED ORAL at 20:49

## 2022-06-23 RX ADMIN — CEFEPIME HYDROCHLORIDE 1000 MG: 1 INJECTION, POWDER, FOR SOLUTION INTRAMUSCULAR; INTRAVENOUS at 08:28

## 2022-06-23 RX ADMIN — Medication 0.6 MCG/KG/HR: at 04:42

## 2022-06-23 RX ADMIN — Medication 15 ML: at 20:49

## 2022-06-23 RX ADMIN — CEFEPIME HYDROCHLORIDE 1000 MG: 1 INJECTION, POWDER, FOR SOLUTION INTRAMUSCULAR; INTRAVENOUS at 20:49

## 2022-06-23 RX ADMIN — CARBIDOPA AND LEVODOPA 1 TABLET: 25; 100 TABLET ORAL at 17:04

## 2022-06-23 RX ADMIN — ALBUMIN (HUMAN) 25 G: 0.25 INJECTION, SOLUTION INTRAVENOUS at 17:06

## 2022-06-23 RX ADMIN — FENTANYL CITRATE 50 MCG: 50 INJECTION, SOLUTION INTRAMUSCULAR; INTRAVENOUS at 10:40

## 2022-06-23 RX ADMIN — POTASSIUM BICARBONATE 50 MEQ: 977.5 TABLET, EFFERVESCENT ORAL at 06:09

## 2022-06-23 RX ADMIN — IPRATROPIUM BROMIDE AND ALBUTEROL SULFATE 1 AMPULE: 2.5; .5 SOLUTION RESPIRATORY (INHALATION) at 15:16

## 2022-06-23 RX ADMIN — LAMOTRIGINE 50 MG: 25 TABLET ORAL at 20:55

## 2022-06-23 RX ADMIN — LAMOTRIGINE 50 MG: 25 TABLET ORAL at 10:41

## 2022-06-23 RX ADMIN — SODIUM CHLORIDE, PRESERVATIVE FREE 10 ML: 5 INJECTION INTRAVENOUS at 10:41

## 2022-06-23 RX ADMIN — MIDAZOLAM HYDROCHLORIDE 2 MG: 1 INJECTION, SOLUTION INTRAMUSCULAR; INTRAVENOUS at 16:12

## 2022-06-23 RX ADMIN — MIDAZOLAM HYDROCHLORIDE 2 MG: 1 INJECTION, SOLUTION INTRAMUSCULAR; INTRAVENOUS at 10:40

## 2022-06-23 RX ADMIN — MUPIROCIN: 20 OINTMENT TOPICAL at 20:48

## 2022-06-23 RX ADMIN — IPRATROPIUM BROMIDE AND ALBUTEROL SULFATE 1 AMPULE: 2.5; .5 SOLUTION RESPIRATORY (INHALATION) at 03:20

## 2022-06-23 RX ADMIN — MIDAZOLAM HYDROCHLORIDE 2 MG: 1 INJECTION, SOLUTION INTRAMUSCULAR; INTRAVENOUS at 13:14

## 2022-06-23 RX ADMIN — Medication 0.8 MCG/KG/HR: at 20:39

## 2022-06-23 RX ADMIN — ALBUMIN (HUMAN) 25 G: 0.25 INJECTION, SOLUTION INTRAVENOUS at 22:47

## 2022-06-23 RX ADMIN — PROPAFENONE HYDROCHLORIDE 150 MG: 150 TABLET, FILM COATED ORAL at 16:31

## 2022-06-23 RX ADMIN — Medication 15 ML: at 08:27

## 2022-06-23 RX ADMIN — Medication 0.7 MCG/KG/HR: at 10:20

## 2022-06-23 RX ADMIN — CARBIDOPA AND LEVODOPA 1 TABLET: 25; 100 TABLET ORAL at 08:27

## 2022-06-23 RX ADMIN — MONTELUKAST SODIUM 10 MG: 10 TABLET, COATED ORAL at 08:27

## 2022-06-23 RX ADMIN — CARBIDOPA AND LEVODOPA 1 TABLET: 25; 100 TABLET ORAL at 13:14

## 2022-06-23 RX ADMIN — FUROSEMIDE 5 MG/HR: 10 INJECTION, SOLUTION INTRAVENOUS at 11:12

## 2022-06-23 RX ADMIN — PANTOPRAZOLE SODIUM 40 MG: 40 INJECTION, POWDER, LYOPHILIZED, FOR SOLUTION INTRAVENOUS at 10:40

## 2022-06-23 RX ADMIN — IPRATROPIUM BROMIDE AND ALBUTEROL SULFATE 1 AMPULE: 2.5; .5 SOLUTION RESPIRATORY (INHALATION) at 19:36

## 2022-06-23 RX ADMIN — FENTANYL CITRATE 50 MCG: 50 INJECTION, SOLUTION INTRAMUSCULAR; INTRAVENOUS at 16:12

## 2022-06-23 RX ADMIN — MIDAZOLAM HYDROCHLORIDE 2 MG: 1 INJECTION, SOLUTION INTRAMUSCULAR; INTRAVENOUS at 07:56

## 2022-06-23 RX ADMIN — IPRATROPIUM BROMIDE AND ALBUTEROL SULFATE 1 AMPULE: 2.5; .5 SOLUTION RESPIRATORY (INHALATION) at 08:18

## 2022-06-23 ASSESSMENT — PULMONARY FUNCTION TESTS
PIF_VALUE: 26
PIF_VALUE: 25
PIF_VALUE: 24
PIF_VALUE: 28
PIF_VALUE: 26
PIF_VALUE: 37
PIF_VALUE: 26
PIF_VALUE: 25
PIF_VALUE: 23
PIF_VALUE: 26
PIF_VALUE: 27
PIF_VALUE: 26
PIF_VALUE: 23
PIF_VALUE: 24

## 2022-06-23 ASSESSMENT — PAIN SCALES - GENERAL
PAINLEVEL_OUTOF10: 5
PAINLEVEL_OUTOF10: 5
PAINLEVEL_OUTOF10: 4
PAINLEVEL_OUTOF10: 6

## 2022-06-23 ASSESSMENT — PAIN DESCRIPTION - LOCATION: LOCATION: GENERALIZED

## 2022-06-23 NOTE — PROGRESS NOTES
RT Nebulizer Bronchodilator Protocol Note    There is a bronchodilator order in the chart from a provider indicating to follow the RT Bronchodilator Protocol and there is an Initiate RT Bronchodilator Protocol order as well (see protocol at bottom of note). CXR Findings:  XR CHEST PORTABLE    Result Date: 6/23/2022  Again identified is bibasilar airspace disease with small pleural effusions and mild increased right perihilar infiltrates. This may be related to atelectasis or pneumonia. Cardiomegaly. XR CHEST PORTABLE    Result Date: 6/22/2022  Stable examination with bilateral small effusions and bibasilar atelectasis. Stable ET tube and enteric tube. The findings from the last RT Protocol Assessment were as follows:  Smoking: Chronic pulmonary disease  Respiratory Pattern: Dyspnea on exertion or RR 21-25 bpm  Breath Sounds: Slightly diminished and/or crackles  Cough: Strong, productive  Indication for Bronchodilator Therapy: Decreased or absent breath sounds  Bronchodilator Assessment Score: 7    Aerosolized bronchodilator medication orders have been revised according to the RT Nebulizer Bronchodilator Protocol below. Respiratory Therapist to perform RT Therapy Protocol Assessment initially then follow the protocol. Repeat RT Therapy Protocol Assessment PRN for score 0-3 or on second treatment, BID, and PRN for scores above 3. No Indications - adjust the frequency to every 6 hours PRN wheezing or bronchospasm, if no treatments needed after 48 hours then discontinue using Per Protocol order mode. If indication present, adjust the RT bronchodilator orders based on the Bronchodilator Assessment Score as indicated below. If a patient is on this medication at home then do not decrease Frequency below that used at home.     0-3 - enter or revise RT bronchodilator order(s) to equivalent RT Bronchodilator order with Frequency of every 4 hours PRN for wheezing or increased work of breathing using

## 2022-06-23 NOTE — PROGRESS NOTES
06/23/22 0805   NICU Vent Information   Skin Assessment Clean, dry, & intact   Vent Type 980   Vt (Set, mL) 380 mL   Vt Exhaled 362 mL   Resp Rate (Set) 20 bmp   Rate Measured 20 br/min   Minute Volume 7.29 Liters   Peak Flow 50 L/min   Pressure Support 0 cmH20   FiO2  35 %   Peak Inspiratory Pressure 28 cmH2O   I:E Ratio 1:2.70   Sensitivity 3   High Peep/I Pressure 0   PEEP/CPAP (cmH2O) 5   I Time/ I Time % 0 s   Mean Airway Pressure 12 cmH20   Plateau Pressure 24 MKW61   Static Compliance 20 mL/cmH2O   Breath Sounds   Right Upper Lobe Diminished   Right Middle Lobe Diminished   Right Lower Lobe Diminished   Left Upper Lobe Diminished   Left Lower Lobe Diminished   Additional Respiratory Assessments   Heart Rate 76   Resp 20   SpO2 96 %   Position Semi-Mccollum's   Humidification Source Heated wire   Humidification Temp 37   Vent Alarm Settings   High Pressure  40 cmH2O   Low Minute Volume Alarm 4 L/min   RR High 40 br/min   NICU Ventilator Associated Pneumonia Bundle   Elevation of Head of Bed Yes   Oral Care Completed Yes   ETT (adult)   Placement Date/Time: 06/18/22 1650   Present on Admission/Arrival: No  Placement Verified By: Direct visualization;Colorimetric ETCO2 device  Preoxygenation: Yes  Airway Tube Size: 8 mm  Laryngoscope: GlideScope  Blade Size: 3  Location: Oral  Secured. ..    Secured At 23 cm   Measured From Lips   ETT Placement Left   Secured By Commercial tube hughes   Site Assessment Dry

## 2022-06-23 NOTE — PROGRESS NOTES
AM assessment done. Pt is alert, restless, but remains stable A this time. No SBT attempted this AM. Will infuse 2 units PRBC's 1st.  Pt is w/out evidence of distress @ this time.

## 2022-06-23 NOTE — PROGRESS NOTES
Admit: 6/17/2022    Name:  Carlo Senior  Room:  [facility]-  MRN:    [id number]    Critical Care Daily Progress Note for 6/23/2022     Admitted with respiratory failure    Interval History:     Transfered to the ICU on 6/17 for worsening respiratory status and increasing oxygen requirement. Placed on Vapotherm. Currently on 100% 40 L in addition to a nonrebreather    6/19  Patient had to be intubated yesterday because of persistent hypoxia on Vapotherm. Underwent bronc this morning. Bloody secretions +    6/20  Patient currently awake, on SBT-low volumes    6/21  SBT again today. 6/22  Mild agitation overnight. Placed on Precedex. Received Lasix yesterday. SBT again today    6/23  ?  Melena this am.  Blood tinged vaginal discharge   Drop in H/H       Scheduled Meds:   cefepime  1,000 mg IntraVENous Q12H    albumin human  25 g IntraVENous Q8H    famotidine (PEPCID) injection  20 mg IntraVENous Daily    ipratropium-albuterol  1 ampule Inhalation Q4H    lidocaine 1 % injection  5 mL IntraDERmal Once    mupirocin   Nasal BID    chlorhexidine  15 mL Mouth/Throat BID    sodium chloride flush  10 mL IntraVENous 2 times per day    apixaban  2.5 mg Oral BID    atorvastatin  40 mg Oral Nightly    carbidopa-levodopa  1 tablet Oral 4x Daily    DULoxetine  60 mg Oral Daily    budesonide-formoterol  2 puff Inhalation BID    lamoTRIgine  50 mg Oral BID    montelukast  10 mg Oral Daily    propafenone  150 mg Oral 3 times per day       Continuous Infusions:   sodium chloride      sodium chloride      furosemide (LASIX) 1mg/ml infusion 5 mg/hr (06/23/22 0412)    dexmedetomidine HCl in NaCl 0.6 mcg/kg/hr (06/23/22 0442)    fentaNYL Stopped (06/22/22 2133)    sodium chloride      propofol Stopped (06/22/22 1134)    sodium chloride         PRN Meds:  sodium chloride, sodium chloride, carboxymethylcellulose PF, fentaNYL **AND** fentaNYL, sodium chloride (Inhalant), albuterol, sodium chloride, 13.8    261 238     BMP:   Recent Labs     06/21/22  0430 06/22/22  0555 06/23/22  0452    138 134*   K 3.6 3.8 3.2*    106 103   CO2 20* 19* 19*   BUN 37* 42* 49*   CREATININE 1.9* 2.2* 2.6*     BNP: No results for input(s): BNP in the last 72 hours. PT/INR:   No results for input(s): PROTIME, INR in the last 72 hours. APTT:No results for input(s): APTT in the last 72 hours. CARDIAC ENZYMES:   No results for input(s): CKMB, CKMBINDEX, TROPONINI in the last 72 hours. Invalid input(s): CKTOTAL;3  FASTING LIPID PANEL:  Lab Results   Component Value Date    CHOL 147 09/27/2021    HDL 49 09/27/2021    HDL 72 12/20/2011    TRIG 105 06/22/2022     LIVER PROFILE:   Recent Labs     06/23/22 0452   AST 14*   ALT <5*   BILIDIR <0.2   BILITOT <0.2   ALKPHOS 66         XR CHEST PORTABLE   Final Result   Stable examination with bilateral small effusions and bibasilar atelectasis. Stable ET tube and enteric tube. XR CHEST PORTABLE   Final Result   No acute process. Bibasilar hypoaeration      Stable cardiomegaly         IR PICC WO SQ PORT/PUMP > 5 YEARS   Final Result      XR CHEST PORTABLE   Final Result   Supportive tubing projects in stable position. Partial regression of atelectasis in the right lung base. Persisting   consolidation or or atelectasis in the left lung base. XR CHEST PORTABLE   Final Result   1. Endotracheal tube terminates in appropriate position above the nirali. 2.  Enteric tube terminates in the body of the stomach. 3.  Basilar opacities again demonstrated, right greater than left. Interval   improved aeration of the left lung base. XR CHEST PORTABLE   Final Result   Increasing opacity in the right base, lobar atelectasis versus accumulating   right pleural effusion. Combination of both entities or underlying pneumonia   also possible. Persisting left basilar airspace disease and small pleural effusion.          XR CHEST PORTABLE   Final Result   Hazy bilateral lobe airspace opacities and small left pleural effusion. This   could represent atelectasis or pneumonia in the appropriate clinical setting. CT CHEST WO CONTRAST   Final Result   1. Respiratory motion in the lower lungs. The right base has linear and   dependent atelectasis. The left does have atelectatic changes but could also   have some patchy opacities from pneumonia or aspiration at the left base. 2.  There is no pleural effusion or pneumothorax. 3.  Narrowing of the AP diameter of the trachea with bulging of the   membranous portion due to the gas distended but nondilated esophagus. XR CHEST PORTABLE   Final Result   1. Bibasilar heterogeneous opacities may represent subsegmental atelectasis   and/or mild pulmonary edema. Underlying infection is difficult to exclude. Short-term follow-up PA and lateral chest radiographs may be helpful for   further evaluation. 2.  Cardiomegaly.          FL ESOPHAGRAM    (Results Pending)   XR CHEST PORTABLE    (Results Pending)   XR CHEST PORTABLE    (Results Pending)         Assessment & Plan:     Patient Active Problem List    Diagnosis Date Noted    Shortness of breath 06/17/2022    Aspiration pneumonia of left lower lobe (HCC)     Sensorineural hearing loss, bilateral 04/07/2022    Laceration of left lower extremity     Pulmonary nodules     C. difficile colitis 10/07/2021    Chest pain 09/27/2021    Pulmonary HTN ([address]     Mitral valve insufficiency     Sialadenitis     Colonic pseudoobstruction     Chronic kidney disease 03/01/2020    Partial symptomatic epilepsy with complex partial seizures, not intractable, without status epilepticus ([address] 40/39/4352    Diastolic congestive heart failure ([address] 01/17/2019    Acute respiratory failure with hypoxia (HCC)     COPD (chronic obstructive pulmonary disease) ([address] 12/10/2018    PAF (paroxysmal atrial fibrillation) ([address]     Septicemia ([facility] 09/08/2018    Severe obstructive sleep apnea     Hepatic steatosis     Diverticulosis     Blind left eye     Parkinson disease ([facility] 05/22/2018    CAD (coronary artery disease)     Morbid obesity with BMI of 40.0-44.9, adult ([facility] 02/10/2017    Type 2 diabetes mellitus with stage 4 chronic kidney disease, without long-term current use of insulin ([facility] 02/10/2017    Moderate persistent asthma 08/09/2016    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-sided sciatica 08/09/2016    Hypothyroidism 01/27/2015    Postural dizziness 02/20/2013    Anemia of chronic disease     GERD (gastroesophageal reflux disease)     Essential hypertension     Hyperlipidemia     Restless leg syndrome     L carotid artery stenosis s/p CEA 01/01/2002            Acute Hypox Resp Failure. Aspiration PNA. Oxygen requirements increased rapidly from 2l/min to NRB to vapotherm  Currently 100% 40 L on Vapotherm in addition to a nonrebreather. Might have to be intubated. Check Resp Culture. Add 3% saline neb. Continue IV Zosyn and vancomycin. Change Zosyn to cefepime. Day #7. DC vancomycin 6/22  Intubated 6/18. SBT again today. Lasix 40 mg IV x2 6/21 with poor response.      Sepsis POA - due to above.       Dysphagia. Abnormal CT  Cough with every meal intake per family report. Underlying Parkinson's. I ordered for MBS and esophagram, though these will unlikely be done till she is better clinically. Currently on tube feeds    Melena   GI bleed   drop in H/H   GI consult   hold eliquis  IV PPI  Monitor h/h q 6       FARHEEN on CKD IIIB  Cr slightly  better then baseline actually o nadmission  Clinically does not appear fluid overloaded. DC IV fluids  Lasix 40 mg IV x2 6/21-with poor response. Worsening creatinine. Consult nephrology.     Parkinson's on Sinemet.       Afib Paroxysmal.   Rythmol   Hold Eliquis given bloody secretions during bronch.      HTN - BP on the low side.    Nifedipine stopped due to this and also leg edema.    Consider alternative when restarting.         DVT Prophylaxis: eliquis held for bloody secretions seen during bronch and melena  Diet:  Tube feeds  Code Status: Full Code    Ilia Santos MD

## 2022-06-23 NOTE — PROGRESS NOTES
RT Inhaler-Nebulizer Bronchodilator Protocol Note    There is a bronchodilator order in the chart from a provider indicating to follow the RT Bronchodilator Protocol and there is an Initiate RT Inhaler-Nebulizer Bronchodilator Protocol order as well (see protocol at bottom of note). CXR Findings:  XR CHEST PORTABLE    Result Date: 6/22/2022  Stable examination with bilateral small effusions and bibasilar atelectasis. Stable ET tube and enteric tube. XR CHEST PORTABLE    Result Date: 6/21/2022  No acute process. Bibasilar hypoaeration Stable cardiomegaly       The findings from the last RT Protocol Assessment were as follows:   History Pulmonary Disease: (P) Chronic pulmonary disease  Respiratory Pattern: (P) Dyspnea on exertion or RR 21-25 bpm  Breath Sounds: (P) Slightly diminished and/or crackles  Cough: (P) Weak, productive  Indication for Bronchodilator Therapy: (P) Decreased or absent breath sounds  Bronchodilator Assessment Score: (P) 8    Aerosolized bronchodilator medication orders have been revised according to the RT Inhaler-Nebulizer Bronchodilator Protocol below. Respiratory Therapist to perform RT Therapy Protocol Assessment initially then follow the protocol. Repeat RT Therapy Protocol Assessment PRN for score 0-3 or on second treatment, BID, and PRN for scores above 3. No Indications - adjust the frequency to every 6 hours PRN wheezing or bronchospasm, if no treatments needed after 48 hours then discontinue using Per Protocol order mode. If indication present, adjust the RT bronchodilator orders based on the Bronchodilator Assessment Score as indicated below.   Use Inhaler orders unless patient has one or more of the following: on home nebulizer, not able to hold breath for 10 seconds, is not alert and oriented, cannot activate and use MDI correctly, or respiratory rate 25 breaths per minute or more, then use the equivalent nebulizer order(s) with same Frequency and PRN reasons

## 2022-06-23 NOTE — PROGRESS NOTES
CM-SR, VSS, pt remains afebrile, UO WNL. Lasix gtt DC'd per Dr's order. 2 units PRBC's infused. Post H&H results noted. Dr. Watson Ill on the unit electrolyte results noted. No vent weaning attempted this shift. Abd. CT Scan done. No results noted @ this time. Pt is resting w/out evidence of distress @ this time. Results for Kayy Alfonso" (MRN [id number]) as of 6/23/2022 18:52   Ref.  Range 6/23/2022 15:00   Potassium Latest Ref Range: 3.5 - 5.1 mmol/L 3.5   Magnesium Latest Ref Range: 1.80 - 2.40 mg/dL 1.90   Hemoglobin Quant Latest Ref Range: 12.0 - 16.0 g/dL 9.2 (L)   Hematocrit Latest Ref Range: 36.0 - 48.0 % 27.6 (L)

## 2022-06-23 NOTE — PROGRESS NOTES
[facility] Progress Note           Subjective/   80y.o. year old female who we are seeing in consultation for FARHEEN/CKD. HPI:  Renal function worsening with diuresis, urine output of 1.3 liters over last 24 hours. LE edema somewhat improved. ROS:  +weak, remains on ventilator, no fevers.     Objective/   GEN:  Chronically ill, BP (!) 120/45   Pulse 74   Temp 98.4 °F (36.9 °C) (Bladder)   Resp 18   Ht 5' 6\" (1.676 m)   Wt 262 lb 9.1 oz (119.1 kg)   SpO2 100%   BMI 42.38 kg/m²   HEENT: non-icteric, no JVD  CV: S1, S2 without m/r/g; ++ LE edema  RESP: CTA B without w/r/r; breathing wnl  ABD: +bs, soft, nt, no hsm  SKIN: warm, no rashes    Data/  Recent Labs     06/21/22  0430 06/22/22  0555 06/23/22  0452   WBC 11.1* 10.3 9.9   HGB 7.8* 7.6* 6.6*   HCT 23.9* 23.4* 20.6*   MCV 86.5 86.5 85.9    261 238     Recent Labs     06/21/22  0430 06/22/22  0555 06/23/22  0452    138 134*   K 3.6 3.8 3.2*    106 103   CO2 20* 19* 19*   GLUCOSE 123* 125* 161*   MG  --   --  1.80   BUN 37* 42* 49*   CREATININE 1.9* 2.2* 2.6*   LABGLOM 25* 21* 18*   GFRAA 31* 26* 21*     Component      Latest Ref Rng & Units 6/22/2022 6/22/2022           4:10 PM  4:10 PM   Color, UA      Straw/Yellow Yellow    Clarity, UA      Clear Clear    Glucose, UA      Negative mg/dL Negative    Bilirubin, Urine      Negative Negative    Ketones, Urine      Negative mg/dL TRACE (A)    Specific Gravity, UA      1.005 - 1.030 1.025    Blood, Urine      Negative Negative    pH, UA      5.0 - 8.0 5.5    Protein, UA      Negative mg/dL TRACE (A)    Urobilinogen, Urine      <2.0 E.U./dL 0.2    Nitrite, Urine      Negative Negative    Leukocyte Esterase, Urine      Negative Negative    Microscopic Examination       YES    Urine Type       NotGiven    Mucus, UA      None Seen /LPF Rare (A)    WBC, UA      0 - 5 /HPF 10-20 (A)    RBC, UA      0 - 4 /HPF 3-4    Epithelial Cells, UA      0 - 5 /HPF 0-1    Bacteria, UA      None Seen /HPF 2+ (A)    Sodium, Ur      Not Established mmol/L  <20   Potassium, Ur      Not Established mmol/L  26.3   Chloride      Not Established mmol/L  <20       Assessment/     - Acute kidney injury - multi-factorial ATN injury in setting of aspiration PNA, hypotension, & elevated vancomycin levels     - Chronic kidney disease stage 3b - background DM/HTN/CHF              SCr 1.6              Follows with [facility]     - Acute hypoxemic respiratory failure/Aspiration PNA     - Diastolic CHF     - Atrial fibrillation    - Anemia - prn prbc's     - Hypertension    - Hypokalemia - prn replacement    Plan/      - Trial of albumin/lasix drip for diuresis - hold lasix with rising SCr  - Monitoring off of ARB  - Trend labs, bp's, & urine output     ____________________________________  Charly Franco MD  [facility]  [email or web address]  Office: [phone]

## 2022-06-23 NOTE — PROGRESS NOTES
Pulmonary & Critical Care Medicine ICU Progress Note    CC: shortness of breath     Events of Last 24 hours: Failed SBT  Nephrology is seen for worsening renal function  Vaginal discharge is blood tinged     Vascular lines: IV:   2022 PICC    MV: 2022 after failing Vapotherm    Resp Rate (Set): 20 bmp/Vt (Set, mL): 380 mL/ /FiO2 : 35 %  Recent Labs     22  1038 22  0540   PHART 7.289* 7.314*   [id number] 44.3 33.8*   PO2ART 84.0 78.6       IV:   sodium chloride      furosemide (LASIX) 1mg/ml infusion 5 mg/hr (22)    dexmedetomidine HCl in NaCl 0.6 mcg/kg/hr (22)    fentaNYL Stopped (22)    sodium chloride      propofol Stopped (22)    sodium chloride         Vitals:  Blood pressure (!) 120/45, pulse 82, temperature 98.4 °F (36.9 °C), temperature source Bladder, resp. rate 20, height 5' 6\" (1.676 m), weight 262 lb 9.1 oz (119.1 kg), SpO2 94 %, not currently breastfeeding. on vent  Temp  Av.9 °F (37.2 °C)  Min: 98.1 °F (36.7 °C)  Max: 99.5 °F (37.5 °C)    Intake/Output Summary (Last 24 hours) at 2022 0723  Last data filed at 2022 9371  Gross per 24 hour   Intake 1397.74 ml   Output 1340 ml   Net 57.74 ml     PE:  General: intubated, ill appearing    ENT: Pharynx with ETT. Resp: No crackles. No wheezing. CV: S1, S2. + edema  GI: NT, ND, +BS  Skin: Warm and dry. Neuro: PERRL. Awake and following commands.  Patellar reflexes are symmetric      Scheduled Meds:   calcium gluconate  1,000 mg IntraVENous Once    cefepime  1,000 mg IntraVENous Q12H    albumin human  25 g IntraVENous Q8H    famotidine (PEPCID) injection  20 mg IntraVENous Daily    ipratropium-albuterol  1 ampule Inhalation Q4H    lidocaine 1 % injection  5 mL IntraDERmal Once    mupirocin   Nasal BID    chlorhexidine  15 mL Mouth/Throat BID    sodium chloride flush  10 mL IntraVENous 2 times per day    apixaban  2.5 mg Oral BID    atorvastatin  40 mg Oral Nightly    carbidopa-levodopa  1 tablet Oral 4x Daily    DULoxetine  60 mg Oral Daily    budesonide-formoterol  2 puff Inhalation BID    lamoTRIgine  50 mg Oral BID    montelukast  10 mg Oral Daily    propafenone  150 mg Oral 3 times per day       Data:  CBC:   Recent Labs     06/21/22  0430 06/22/22  0555 06/23/22  0452   WBC 11.1* 10.3 9.9   HGB 7.8* 7.6* 6.6*   HCT 23.9* 23.4* 20.6*   MCV 86.5 86.5 85.9    261 238     BMP:   Recent Labs     06/21/22  0430 06/22/22  0555 06/23/22  0452    138 134*   K 3.6 3.8 3.2*    106 103   CO2 20* 19* 19*   BUN 37* 42* 49*   CREATININE 1.9* 2.2* 2.6*     LIVER PROFILE:   Recent Labs     06/23/22 0452   AST 14*   ALT <5*   BILIDIR <0.2   BILITOT <0.2   ALKPHOS 77       Microbiology:  6/17/2022 SARS-CoV-2 and influenza are negative  6/17/2022 MRSA DNA screen positive  6/17/2022 blood NG  6/17/2022 sputum NRF  6/18/2022 tracheal aspirate NRF  6/19/2022 BAL Candida  6/19/2022 respiratory viral panel negative    Imaging:  CXR 6/22/2022 small effusions ET tube okay    ASSESSMENT:  · Acute hypoxemic respiratory failure  · Aspiration pneumonia  · BETSY on BiPAP 22/15 with 1 L bleed in   · Parkinson's disease  · Atrial fibrillation  · Acute on chronic kidney failure, baseline creatinine 1.6    PLAN:  Mechanical ventilation as per my orders. The ventilator was adjusted by me at the bedside for unstable, life threatening respiratory failure. IV Precedex for sedation, target RASS -2, with daily spontaneous awakening trial.  Fentanyl gtt as needed.    Head of bed 30 degrees or higher at all times  · Daily spontaneous breathing trial; patient will almost certainly be unable to compensate for her metabolic acidosis  · Antibiotic day #7, now cefepime, received 6 days vancomycin  · Inhaled bronchodilators  · Eliquis is held, GI to see  · Lasix drip and albumin per nephrology  · Prophylaxis: SCD, Bactroban, famotidine    Total critical care time caring for this patient with life threatening, unstable organ failure, including direct patient contact, management of life support systems, review of data including imaging and labs, discussions with other team members and physicians is 31 minutes so far today, excluding procedures.

## 2022-06-23 NOTE — CONSULTS
Gastroenterology Consult Note    Patient:   Ozzie Paul   :    1940   Facility:   [facility]  Referring/PCP: Oliva Ambrose MD  Date:     2022  Consultant:   Navin Clarke PA-C      Chief Complaint   Patient presents with    Shortness of Breath        History of Present illness     80year old female with a history of HTN, CKD, Parkinson's, A fib, CHF, and BETSY admitted with respiratory failure requiring intubation. GI was consulted for evaluation of anemia to r/o bleed. She is intubated, so history obtained upon chart review, discussion with nurse, and bedside evaluation. She is occult positive. She is scheduled to receive 2U PRBCs today. She is passing BMs per [employer]. Her  reports dysphagia at home. She is tolerating TFs per OG at goal rate. Her last colonoscopy in 2014 showed benign precancerous polyps. Her last decompression colonoscopy was 3/2020. Her last EGD in 3/2020 showed gastroparesis and severe esophagitis.        Past Medical History:   Diagnosis Date    Anemia, unspecified     neg bone marrow    Asthma     Atrial fibrillation ([address]     Baker's cyst     Basal cell carcinoma of left cheek 3/12/2019    Blind left eye     Carotid artery stenoses     Carotid stenosis 2002    left    Cervical spinal cord compression ([address] 2010    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-sided sciatica 2016    CKD (chronic kidney disease) stage 3, GFR 30-59 ml/min ([facility])     Community acquired pneumonia of left lower lobe of lung 2018    CRI     Detached retina, left     Diverticulosis     DJD (degenerative joint disease)     Edema     chronic    Fatty liver     GERD (gastroesophageal reflux disease)     Hearing loss     Herniated lumbar intervertebral disc     Hx stage 2 sacral decubitus ulcer 2018    Hyperlipidemia     Hypertension     Hypothyroid 2015    Morbid obesity with BMI of 40.0-44.9, adult ([address] 2/10/2017    BETSY treated with BiPAP     Parkinson's disease [facility]     Partial symptomatic epilepsy with complex partial seizures, not intractable, without status epilepticus ([facility] 1/24/2020    Restless leg syndrome     Restless legs syndrome     Rheumatic fever 1950    2x IN [place]    Sleep apnea     Tremor, essential 8/16/2010    Type 2 diabetes mellitus without complication ([facility]     Type II or unspecified type diabetes mellitus without mention of complication, not stated as uncontrolled      Past Surgical History:   Procedure Laterality Date    APPENDECTOMY      CAROTID ENDARTERECTOMY      left    CATARACT REMOVAL      CERVICAL FUSION  11/2010    CHOLECYSTECTOMY      COLONOSCOPY N/A 3/8/2020    COLONOSCOPY FLEXIBLE W/ DECOMPRESSION performed by Carlos Sanches MD at [address] (CERVIX STATUS UNKNOWN)      IR MIDLINE CATH  10/8/2021    IR MIDLINE CATH 10/8/2021 [facility]    JOINT REPLACEMENT Bilateral 2003    KNEE SURGERY      replacement x 2    OVARY REMOVAL      SHOULDER SURGERY      TONSILLECTOMY      TUBAL LIGATION      UPPER GASTROINTESTINAL ENDOSCOPY  03/17/2020    Esophagitis    UPPER GASTROINTESTINAL ENDOSCOPY N/A 3/17/2020    EGD performed by Kike Anguiano MD at [facility]         Social:   Social History     Tobacco Use    Smoking status: Former Smoker    Smokeless tobacco: Never Used   Substance Use Topics    Alcohol use: No     Family:   Family History   Problem Relation Age of Onset    Diabetes Mother     Heart Disease Mother     Diabetes Father     Heart Disease Father     Diabetes Sister      No current facility-administered medications on file prior to encounter.      Current Outpatient Medications on File Prior to Encounter   Medication Sig Dispense Refill    lidocaine (XYLOCAINE) 5 % ointment Apply topically every 3 hours as needed for Pain Apply topically as needed.       montelukast (SINGULAIR) 10 MG tablet TAKE 1 TABLET EVERY DAY 90 tablet 1    apixaban (ELIQUIS) 2.5 MG TABS tablet Take 1 tablet by mouth 2 times daily 180 tablet 3    valsartan (DIOVAN) 80 MG tablet Take 1 tablet by mouth daily      albuterol sulfate  (90 Base) MCG/ACT inhaler INHALE 2 PUFFS INTO THE LUNGS EVERY 6 HOURS AS NEEDED FOR WHEEZING 3 each 2    furosemide (LASIX) 40 MG tablet TAKE 1/2 TABLET EVERY DAY 45 tablet 1    NIFEdipine (PROCARDIA XL) 90 MG extended release tablet TAKE 1 TABLET EVERY DAY 90 tablet 1    metoprolol tartrate (LOPRESSOR) 50 MG tablet TAKE 1 TABLET TWICE DAILY 180 tablet 1    DULoxetine (CYMBALTA) 60 MG extended release capsule TAKE 1 CAPSULE EVERY DAY 90 capsule 1    fluticasone-vilanterol (BREO ELLIPTA) 100-25 MCG/INH AEPB inhaler INHALE 1 PUFF EVERY DAY 3 each 3    atorvastatin (LIPITOR) 40 MG tablet TAKE 1 AND 1/2 TABLETS EVERY  tablet 3    zinc oxide 16 % OINT ointment Apply topically 4 times daily as needed for Dry Skin (Patient not taking: Reported on 6/17/2022) 50 g 1    propafenone (RYTHMOL) 150 MG tablet TAKE 1 TABLET BY MOUTH EVERY 8 HOURS 270 tablet 3    ferrous sulfate (IRON 325) 325 (65 Fe) MG tablet Take 325 mg by mouth 2 times daily       fluticasone (FLONASE) 50 MCG/ACT nasal spray USE 2 PUFFS IN EACH NOSTRIL DAILY 3 Bottle 10    carbidopa-levodopa (SINEMET)  MG per tablet Take 1 tablet by mouth 4 times daily      Alpha-Lipoic Acid 300 MG TABS Take by mouth daily      lamoTRIgine (LAMICTAL) 25 MG tablet Take 50 mg by mouth 2 times daily       Handicap Placard MISC by Does not apply route DX: G20  parkinson's disease  Exp: 9/1/2024 1 each 0    ipratropium-albuterol (DUONEB) 0.5-2.5 (3) MG/3ML SOLN nebulizer solution Inhale 3 mLs into the lungs every 6 hours as needed for Shortness of Breath DX: J45.40 360 mL 1    Multiple Vitamins-Minerals (THERAPEUTIC MULTIVITAMIN-MINERALS) tablet Take 1 tablet by mouth daily      [DISCONTINUED] solifenacin (VESICARE) 5 MG tablet Take 1 tablet by mouth daily. 30 tablet 3      Infusions:    sodium chloride      sodium chloride      furosemide (LASIX) 1mg/ml infusion 5 mg/hr (06/23/22 1112)    dexmedetomidine HCl in NaCl 0.7 mcg/kg/hr (06/23/22 1020)    sodium chloride      sodium chloride       PRN Medications: sodium chloride, sodium chloride, carboxymethylcellulose PF, sodium chloride (Inhalant), albuterol, sodium chloride, midazolam, fentanNYL, sodium chloride flush, sodium chloride, potassium chloride, magnesium sulfate, promethazine **OR** ondansetron, acetaminophen **OR** acetaminophen, albuterol  Allergies: Allergies   Allergen Reactions    Levaquin [Levofloxacin]      Pt was given in er and developed hives and redness    Metoclopramide     Phenazopyridine     Pyridium [Phenazopyridine Hcl]     Reglan [Metoclopramide Hcl]     Reglan [Metoclopramide Hcl]      tremors       ROS: Unobtainable as patient is intubated and sedated. Physical Exam   BP (!) 161/55   Pulse 79   Temp 97.7 °F (36.5 °C)   Resp 20   Ht 5' 6\" (1.676 m)   Wt 262 lb 9.1 oz (119.1 kg)   SpO2 92%   BMI 42.38 kg/m²       General appearance: appears older than stated age, cooperative, fatigued, morbidly obese, pale and syndromic appearance - chronically ill appearing, intuabted  Head: Normocephalic, without obvious abnormality, atraumatic  Eyes: conjunctivae/corneas clear. PERRL, EOM's intact. Fundi benign.   Neck: no adenopathy and supple, symmetrical, trachea midline  Lungs: clear to auscultation bilaterally  Heart: regular rate and rhythm, S1, S2 normal, no murmur, click, rub or gallop  Abdomen: normal findings: bowel sounds normal, no masses palpable, soft, non-tender and symmetric and abnormal findings:  obese  Extremities: edema 2+ BLE    Lab and Imaging Review   Labs:  CBC:   Recent Labs     06/21/22  0430 06/22/22  0555 06/23/22  0452   WBC 11.1* 10.3 9.9   HGB 7.8* 7.6* 6.6*   HCT 23.9* 23.4* 20.6*   MCV 86.5 86.5 85.9    261 238     BMP:   Recent Labs     06/21/22  0430 06/22/22  0555 06/23/22  0452    138 134*   K 3.6 3.8 3.2*    106 103   CO2 20* 19* 19*   BUN 37* 42* 49*   CREATININE 1.9* 2.2* 2.6*     LIVER PROFILE:   Recent Labs     06/23/22 0452   AST 14*   ALT <5*   PROT 5.6*   BILIDIR <0.2   BILITOT <0.2   ALKPHOS 66     PT/INR: No results for input(s): INR in the last 72 hours. Invalid input(s): PT      IMAGING:  CT CHEST WO CONTRAST - 6/17/2022  Impression   1.  Respiratory motion in the lower lungs.  The right base has linear and   dependent atelectasis.  The left does have atelectatic changes but could also   have some patchy opacities from pneumonia or aspiration at the left base.       2.  There is no pleural effusion or pneumothorax.       3.  Narrowing of the AP diameter of the trachea with bulging of the   membranous portion due to the gas distended but nondilated esophagus. XR CHEST PORTABLE - 6/23/2022  Impression   Again identified is bibasilar airspace disease with small pleural effusions   and mild increased right perihilar infiltrates.  This may be related to   atelectasis or pneumonia.       Cardiomegaly. Attending Supervising [de-identified] Attestation Statement  The patient is a 80 y.o. female. I have performed a history and physical examination of the patient. I discussed the case with my physician assistant Charles Alcantar PA-C    I reviewed the patient's Past Medical History, Past Surgical History, Medications, and Allergies.      Physical Exam:  Vitals:    06/23/22 1517 06/23/22 1600 06/23/22 1700 06/23/22 1800   BP:  (!) 154/60 (!) 138/52 (!) 141/56   Pulse: 72 77 92 72   Resp: 20 20 21 20   Temp:  98.1 °F (36.7 °C)     TempSrc:  Bladder     SpO2: 98% 99%     Weight:       Height:           Physical Examination: General appearance - chronically ill appearing  Mental status - intubated and sedated  Eyes - sclera anicteric  Neck - supple, no significant adenopathy  Chest - no tachypnea, retractions or cyanosis  Heart - normal rate and regular rhythm  Abdomen - soft, nontender, nondistended, no masses or organomegaly  Extremities - 2+ pedal edema noted          Assessment:     80year old female with a history of HTN, CKD, Parkinson's, A fib, CHF, and BETSY admitted with respiratory failure requiring intubation. Hospitalization c/b anemia requiring 2U PRBCs concerning for colitis, PUD, AVM, polyps, and less likely malignancy. Plan:   1. Continue supportive care  2. Monitor Hgb  3. Observe for signs of bleeding  4. Monitor and document output  5. Pantoprazole 40 mg BID  6. Check iron studies  7. Check CT abd/pelvis   8. Continue TFs per OG as tolerated   9. Nephrology following   10. Will consider colonoscopy +/- EGD when cardiopulmonary status has improved   11. Will follow      Wes Lenz PA-C  12:46 PM 6/23/2022                      72-year-old female with a history of HTN, HLD, DM, CKD, asthma, atrial fibrillation, carotid stenosis, chronic back pain, DDD, and hypothyroidism admitted with acute respiratory failure and aspiration pneumonia. She has known history of chronic anemia. Her acute anemia is most likely multifactorial including anemia of chronic disease, and possible GI bleed. No evidence of active bleeding    Continue supportive care. PPI BID. Check CT abd/pelvis. High risk for endoscopy in this patient without signs of active bleeding.  Will follow    Katelin Ford MD          [phone]  [facility]

## 2022-06-23 NOTE — PROGRESS NOTES
06/23/22 1140   NICU Vent Information   Vent Type 980   Vent Mode AC/VC   Vt (Set, mL) 380 mL   Vt Exhaled 370 mL   Resp Rate (Set) 20 bmp   Rate Measured 20 br/min   Minute Volume 7.41 Liters   Peak Flow 50 L/min   Pressure Support 0 cmH20   FiO2  35 %   Peak Inspiratory Pressure 24 cmH2O   I:E Ratio 1:2.70   Sensitivity 3   High Peep/I Pressure 0   PEEP/CPAP (cmH2O) 5   I Time/ I Time % 0 s   Mean Airway Pressure 11 cmH20   Cough/Sputum   Sputum How Obtained Endotracheal;Suctioned   Cough Productive   Sputum Amount Small   Sputum Color Brown;Creamy   Tenacity Thick   Breath Sounds   Right Upper Lobe Diminished   Right Middle Lobe Diminished   Right Lower Lobe Diminished   Left Upper Lobe Diminished   Left Lower Lobe Diminished   Additional Respiratory Assessments   Heart Rate 72   Resp 20   SpO2 98 %   Position Semi-Mccollum's   Humidification Source Heated wire   Humidification Temp 37   Circuit Condensation Drained   Vent Alarm Settings   High Pressure  40 cmH2O   Low Minute Volume Alarm 4 L/min   Low Exhaled Vt  250 mL   RR High 40 br/min   Apnea (Secs) 20 secs   Ambu Bag With Mask At Bedside Yes   NICU Ventilator Associated Pneumonia Bundle   Elevation of Head of Bed Yes   Oral Care Completed Yes   Oral Care Performed Mouthwash with chlorhexidine;Mouth swabbed;Mouth suctioned   Oral Suctioning Yes   ETT/Trach Suctioning Yes   ETT (adult)   Placement Date/Time: 06/18/22 1650   Present on Admission/Arrival: No  Placement Verified By: Direct visualization;Colorimetric ETCO2 device  Preoxygenation: Yes  Airway Tube Size: 8 mm  Laryngoscope: GlideScope  Blade Size: 3  Location: Oral  Secured. ..    Secured At 23 cm   Measured From Lips   Secured By Commercial tube hughes

## 2022-06-23 NOTE — PLAN OF CARE
Problem: Discharge Planning  Goal: Discharge to home or other facility with appropriate resources  6/22/2022 2048 by Ramsey Dow RN  Outcome: Progressing  6/22/2022 2048 by Ramsey Dow RN  Outcome: Progressing  Flowsheets (Taken 6/22/2022 2000)  Discharge to home or other facility with appropriate resources: Identify barriers to discharge with patient and caregiver     Problem: Pain  Goal: Verbalizes/displays adequate comfort level or baseline comfort level  6/22/2022 2048 by Ramsey Dow RN  Outcome: Progressing  6/22/2022 2048 by Ramsey Dow RN  Outcome: Progressing  Flowsheets (Taken 6/22/2022 2000)  Verbalizes/displays adequate comfort level or baseline comfort level:   Encourage patient to monitor pain and request assistance   Assess pain using appropriate pain scale   Administer analgesics based on type and severity of pain and evaluate response   Implement non-pharmacological measures as appropriate and evaluate response     Problem: ABCDS Injury Assessment  Goal: Absence of physical injury  6/22/2022 2048 by Ramsey Dow RN  Outcome: Progressing  6/22/2022 2048 by Ramsey Dow RN  Outcome: Progressing     Problem: Skin/Tissue Integrity  Goal: Absence of new skin breakdown  Description: 1. Monitor for areas of redness and/or skin breakdown  2. Assess vascular access sites hourly  3. Every 4-6 hours minimum:  Change oxygen saturation probe site  4. Every 4-6 hours:  If on nasal continuous positive airway pressure, respiratory therapy assess nares and determine need for appliance change or resting period.   6/22/2022 2048 by Ramsey Dow RN  Outcome: Progressing  6/22/2022 2048 by Ramsey Dow RN  Outcome: Progressing     Problem: Chronic Conditions and Co-morbidities  Goal: Patient's chronic conditions and co-morbidity symptoms are monitored and maintained or improved  6/22/2022 2048 by Ramsey Dow RN  Outcome: Progressing  6/22/2022 2048 by Ramsey Dow RN  Outcome: Progressing  Flowsheets (Taken 6/22/2022 2000)  Care Plan - Patient's Chronic Conditions and Co-Morbidity Symptoms are Monitored and Maintained or Improved: Monitor and assess patient's chronic conditions and comorbid symptoms for stability, deterioration, or improvement     Problem: Safety - Adult  Goal: Free from fall injury  6/22/2022 2048 by Ramsey Dow RN  Outcome: Progressing  6/22/2022 2048 by Ramsey Dow RN  Outcome: Progressing     Problem: Safety - Medical Restraint  Goal: Remains free of injury from restraints (Restraint for Interference with Medical Device)  Description: INTERVENTIONS:  1. Determine that other, less restrictive measures have been tried or would not be effective before applying the restraint  2. Evaluate the patient's condition at the time of restraint application  3. Inform patient/family regarding the reason for restraint  4.  Q2H: Monitor safety, psychosocial status, comfort, nutrition and hydration  6/22/2022 2048 by Ramsey Dow RN  Outcome: Progressing  6/22/2022 2048 by Ramsey Dow RN  Outcome: Progressing  Flowsheets  Taken 6/22/2022 2000 by Ramsey Dow RN  Remains free of injury from restraints (restraint for interference with medical device):   Determine that other, less restrictive measures have been tried or would not be effective before applying the restraint   Evaluate the patient's condition at the time of restraint application   Inform patient/family regarding the reason for restraint   Every 2 hours: Monitor safety, psychosocial status, comfort, nutrition and hydration  Taken 6/22/2022 1800 by Roxanne Zaman RN  Remains free of injury from restraints (restraint for interference with medical device): Every 2 hours: Monitor safety, psychosocial status, comfort, nutrition and hydration  Taken 6/22/2022 1600 by Shweta Uribe RN  Remains free of injury from restraints (restraint for interference with medical device): Every 2 hours: Monitor safety, psychosocial status, comfort, nutrition and hydration  Taken 6/22/2022 1400 by Enrique Griffin RN  Remains free of injury from restraints (restraint for interference with medical device): Every 2 hours: Monitor safety, psychosocial status, comfort, nutrition and hydration  Taken 6/22/2022 1200 by Enrique Griffin RN  Remains free of injury from restraints (restraint for interference with medical device): Every 2 hours: Monitor safety, psychosocial status, comfort, nutrition and hydration  Taken 6/22/2022 1000 by Enrique Griffin RN  Remains free of injury from restraints (restraint for interference with medical device): Every 2 hours: Monitor safety, psychosocial status, comfort, nutrition and hydration  Taken 6/22/2022 0800 by Shweta Uribe RN  Remains free of injury from restraints (restraint for interference with medical device): Every 2 hours: Monitor safety, psychosocial status, comfort, nutrition and hydration     Problem: Nutrition Deficit:  Goal: Optimize nutritional status  6/22/2022 2048 by Leena Benton RN  Outcome: Progressing  6/22/2022 2048 by Leena Benton RN  Outcome: Progressing     Pt resting in bed tolerating vent settings, IV's infusing, TF infusing, archuleta patent, no s/s of distress.

## 2022-06-23 NOTE — PROGRESS NOTES
GI consult called to [facility] answering service, as patient has been seen by Dr. Dyllan Graham previously, 6/23/22 @ [facility]

## 2022-06-24 ENCOUNTER — APPOINTMENT (OUTPATIENT)
Dept: GENERAL RADIOLOGY | Age: 82
DRG: 004 | End: 2022-06-24
Payer: MEDICARE

## 2022-06-24 PROBLEM — E44.1 MILD PROTEIN-CALORIE MALNUTRITION (HCC): Status: ACTIVE | Noted: 2022-01-01

## 2022-06-24 LAB
ANION GAP SERPL CALCULATED.3IONS-SCNC: 12 MMOL/L (ref 3–16)
BASE EXCESS ARTERIAL: -6.3 MMOL/L (ref -3–3)
BASE EXCESS ARTERIAL: -6.3 MMOL/L (ref -3–3)
BASOPHILS ABSOLUTE: 0 K/UL (ref 0–0.2)
BASOPHILS RELATIVE PERCENT: 0.2 %
BUN BLDV-MCNC: 50 MG/DL (ref 7–20)
CALCIUM SERPL-MCNC: 6.9 MG/DL (ref 8.3–10.6)
CARBOXYHEMOGLOBIN ARTERIAL: 0.3 % (ref 0–1.5)
CARBOXYHEMOGLOBIN ARTERIAL: 0.3 % (ref 0–1.5)
CHLORIDE BLD-SCNC: 103 MMOL/L (ref 99–110)
CO2: 19 MMOL/L (ref 21–32)
CREAT SERPL-MCNC: 2.6 MG/DL (ref 0.6–1.2)
EOSINOPHILS ABSOLUTE: 0.3 K/UL (ref 0–0.6)
EOSINOPHILS RELATIVE PERCENT: 2.9 %
FERRITIN: 103.1 NG/ML (ref 15–150)
FOLATE: 17.37 NG/ML (ref 4.78–24.2)
GFR AFRICAN AMERICAN: 21
GFR NON-AFRICAN AMERICAN: 18
GLUCOSE BLD-MCNC: 115 MG/DL (ref 70–99)
GLUCOSE BLD-MCNC: 130 MG/DL (ref 70–99)
GLUCOSE BLD-MCNC: 131 MG/DL (ref 70–99)
GLUCOSE BLD-MCNC: 132 MG/DL (ref 70–99)
GLUCOSE BLD-MCNC: 134 MG/DL (ref 70–99)
HCO3 ARTERIAL: 19.1 MMOL/L (ref 21–29)
HCO3 ARTERIAL: 19.7 MMOL/L (ref 21–29)
HCT VFR BLD CALC: 27.6 % (ref 36–48)
HEMOGLOBIN, ART, EXTENDED: 10.1 G/DL (ref 12–16)
HEMOGLOBIN, ART, EXTENDED: 8.8 G/DL (ref 12–16)
HEMOGLOBIN: 8.9 G/DL (ref 12–16)
IRON SATURATION: 19 % (ref 15–50)
IRON: 21 UG/DL (ref 37–145)
LYMPHOCYTES ABSOLUTE: 0.7 K/UL (ref 1–5.1)
LYMPHOCYTES RELATIVE PERCENT: 5.9 %
MCH RBC QN AUTO: 27.5 PG (ref 26–34)
MCHC RBC AUTO-ENTMCNC: 32.4 G/DL (ref 31–36)
MCV RBC AUTO: 85 FL (ref 80–100)
METHEMOGLOBIN ARTERIAL: 0 %
METHEMOGLOBIN ARTERIAL: 0.3 %
MONOCYTES ABSOLUTE: 1 K/UL (ref 0–1.3)
MONOCYTES RELATIVE PERCENT: 9.4 %
NEUTROPHILS ABSOLUTE: 9 K/UL (ref 1.7–7.7)
NEUTROPHILS RELATIVE PERCENT: 81.6 %
O2 SAT, ARTERIAL: 94.9 %
O2 SAT, ARTERIAL: 95.7 %
O2 THERAPY: ABNORMAL
O2 THERAPY: ABNORMAL
PCO2 ARTERIAL: 37.2 MMHG (ref 35–45)
PCO2 ARTERIAL: 41.1 MMHG (ref 35–45)
PDW BLD-RTO: 14.2 % (ref 12.4–15.4)
PERFORMED ON: ABNORMAL
PH ARTERIAL: 7.3 (ref 7.35–7.45)
PH ARTERIAL: 7.33 (ref 7.35–7.45)
PLATELET # BLD: 231 K/UL (ref 135–450)
PMV BLD AUTO: 8.1 FL (ref 5–10.5)
PO2 ARTERIAL: 81 MMHG (ref 75–108)
PO2 ARTERIAL: 84.1 MMHG (ref 75–108)
POTASSIUM SERPL-SCNC: 3.6 MMOL/L (ref 3.5–5.1)
RBC # BLD: 3.25 M/UL (ref 4–5.2)
SODIUM BLD-SCNC: 134 MMOL/L (ref 136–145)
TCO2 ARTERIAL: 20.2 MMOL/L
TCO2 ARTERIAL: 21 MMOL/L
TOTAL IRON BINDING CAPACITY: 112 UG/DL (ref 260–445)
VITAMIN B-12: 997 PG/ML (ref 211–911)
WBC # BLD: 11.1 K/UL (ref 4–11)

## 2022-06-24 PROCEDURE — 2580000003 HC RX 258: Performed by: INTERNAL MEDICINE

## 2022-06-24 PROCEDURE — 6360000002 HC RX W HCPCS: Performed by: INTERNAL MEDICINE

## 2022-06-24 PROCEDURE — P9047 ALBUMIN (HUMAN), 25%, 50ML: HCPCS | Performed by: INTERNAL MEDICINE

## 2022-06-24 PROCEDURE — 6370000000 HC RX 637 (ALT 250 FOR IP): Performed by: INTERNAL MEDICINE

## 2022-06-24 PROCEDURE — 85025 COMPLETE CBC W/AUTO DIFF WBC: CPT

## 2022-06-24 PROCEDURE — C9113 INJ PANTOPRAZOLE SODIUM, VIA: HCPCS | Performed by: INTERNAL MEDICINE

## 2022-06-24 PROCEDURE — 94640 AIRWAY INHALATION TREATMENT: CPT

## 2022-06-24 PROCEDURE — 82803 BLOOD GASES ANY COMBINATION: CPT

## 2022-06-24 PROCEDURE — 2500000003 HC RX 250 WO HCPCS: Performed by: INTERNAL MEDICINE

## 2022-06-24 PROCEDURE — 94003 VENT MGMT INPAT SUBQ DAY: CPT

## 2022-06-24 PROCEDURE — 80048 BASIC METABOLIC PNL TOTAL CA: CPT

## 2022-06-24 PROCEDURE — 99291 CRITICAL CARE FIRST HOUR: CPT | Performed by: INTERNAL MEDICINE

## 2022-06-24 PROCEDURE — 99233 SBSQ HOSP IP/OBS HIGH 50: CPT | Performed by: INTERNAL MEDICINE

## 2022-06-24 PROCEDURE — 94761 N-INVAS EAR/PLS OXIMETRY MLT: CPT

## 2022-06-24 PROCEDURE — 2000000000 HC ICU R&B

## 2022-06-24 PROCEDURE — 71045 X-RAY EXAM CHEST 1 VIEW: CPT

## 2022-06-24 RX ADMIN — CEFEPIME HYDROCHLORIDE 1000 MG: 1 INJECTION, POWDER, FOR SOLUTION INTRAMUSCULAR; INTRAVENOUS at 20:59

## 2022-06-24 RX ADMIN — IPRATROPIUM BROMIDE AND ALBUTEROL SULFATE 1 AMPULE: 2.5; .5 SOLUTION RESPIRATORY (INHALATION) at 23:25

## 2022-06-24 RX ADMIN — MIDAZOLAM HYDROCHLORIDE 2 MG: 1 INJECTION, SOLUTION INTRAMUSCULAR; INTRAVENOUS at 11:03

## 2022-06-24 RX ADMIN — CARBIDOPA AND LEVODOPA 1 TABLET: 25; 100 TABLET ORAL at 13:23

## 2022-06-24 RX ADMIN — MUPIROCIN: 20 OINTMENT TOPICAL at 20:34

## 2022-06-24 RX ADMIN — LAMOTRIGINE 50 MG: 25 TABLET ORAL at 09:00

## 2022-06-24 RX ADMIN — CARBIDOPA AND LEVODOPA 1 TABLET: 25; 100 TABLET ORAL at 16:25

## 2022-06-24 RX ADMIN — Medication 15 ML: at 09:00

## 2022-06-24 RX ADMIN — IPRATROPIUM BROMIDE AND ALBUTEROL SULFATE 1 AMPULE: 2.5; .5 SOLUTION RESPIRATORY (INHALATION) at 03:28

## 2022-06-24 RX ADMIN — CARBIDOPA AND LEVODOPA 1 TABLET: 25; 100 TABLET ORAL at 08:05

## 2022-06-24 RX ADMIN — DULOXETINE HYDROCHLORIDE 60 MG: 60 CAPSULE, DELAYED RELEASE ORAL at 08:05

## 2022-06-24 RX ADMIN — CARBIDOPA AND LEVODOPA 1 TABLET: 25; 100 TABLET ORAL at 20:35

## 2022-06-24 RX ADMIN — PANTOPRAZOLE SODIUM 40 MG: 40 INJECTION, POWDER, LYOPHILIZED, FOR SOLUTION INTRAVENOUS at 20:34

## 2022-06-24 RX ADMIN — PANTOPRAZOLE SODIUM 40 MG: 40 INJECTION, POWDER, LYOPHILIZED, FOR SOLUTION INTRAVENOUS at 08:05

## 2022-06-24 RX ADMIN — PROPAFENONE HYDROCHLORIDE 150 MG: 150 TABLET, FILM COATED ORAL at 20:35

## 2022-06-24 RX ADMIN — Medication 0.7 MCG/KG/HR: at 16:25

## 2022-06-24 RX ADMIN — SODIUM CHLORIDE, PRESERVATIVE FREE 10 ML: 5 INJECTION INTRAVENOUS at 08:05

## 2022-06-24 RX ADMIN — FENTANYL CITRATE 50 MCG: 50 INJECTION, SOLUTION INTRAMUSCULAR; INTRAVENOUS at 13:23

## 2022-06-24 RX ADMIN — FENTANYL CITRATE 50 MCG: 50 INJECTION, SOLUTION INTRAMUSCULAR; INTRAVENOUS at 16:52

## 2022-06-24 RX ADMIN — ALBUMIN (HUMAN) 25 G: 0.25 INJECTION, SOLUTION INTRAVENOUS at 06:25

## 2022-06-24 RX ADMIN — Medication 15 ML: at 20:34

## 2022-06-24 RX ADMIN — CEFEPIME HYDROCHLORIDE 1000 MG: 1 INJECTION, POWDER, FOR SOLUTION INTRAMUSCULAR; INTRAVENOUS at 09:54

## 2022-06-24 RX ADMIN — MONTELUKAST SODIUM 10 MG: 10 TABLET, COATED ORAL at 08:05

## 2022-06-24 RX ADMIN — Medication 2 PUFF: at 19:19

## 2022-06-24 RX ADMIN — PROPAFENONE HYDROCHLORIDE 150 MG: 150 TABLET, FILM COATED ORAL at 13:24

## 2022-06-24 RX ADMIN — IPRATROPIUM BROMIDE AND ALBUTEROL SULFATE 1 AMPULE: 2.5; .5 SOLUTION RESPIRATORY (INHALATION) at 14:59

## 2022-06-24 RX ADMIN — ATORVASTATIN CALCIUM 40 MG: 40 TABLET, FILM COATED ORAL at 20:35

## 2022-06-24 RX ADMIN — MIDAZOLAM HYDROCHLORIDE 2 MG: 1 INJECTION, SOLUTION INTRAMUSCULAR; INTRAVENOUS at 14:23

## 2022-06-24 RX ADMIN — Medication 1 MCG/KG/HR: at 04:54

## 2022-06-24 RX ADMIN — Medication 0.8 MCG/KG/HR: at 20:53

## 2022-06-24 RX ADMIN — Medication 0.5 MCG/KG/HR: at 11:11

## 2022-06-24 RX ADMIN — LAMOTRIGINE 50 MG: 25 TABLET ORAL at 20:35

## 2022-06-24 RX ADMIN — Medication 2 PUFF: at 07:44

## 2022-06-24 RX ADMIN — IPRATROPIUM BROMIDE AND ALBUTEROL SULFATE 1 AMPULE: 2.5; .5 SOLUTION RESPIRATORY (INHALATION) at 07:42

## 2022-06-24 RX ADMIN — SODIUM CHLORIDE, PRESERVATIVE FREE 10 ML: 5 INJECTION INTRAVENOUS at 19:42

## 2022-06-24 RX ADMIN — Medication 0.8 MCG/KG/HR: at 00:09

## 2022-06-24 RX ADMIN — MUPIROCIN: 20 OINTMENT TOPICAL at 08:05

## 2022-06-24 RX ADMIN — IPRATROPIUM BROMIDE AND ALBUTEROL SULFATE 1 AMPULE: 2.5; .5 SOLUTION RESPIRATORY (INHALATION) at 19:20

## 2022-06-24 RX ADMIN — IPRATROPIUM BROMIDE AND ALBUTEROL SULFATE 1 AMPULE: 2.5; .5 SOLUTION RESPIRATORY (INHALATION) at 11:19

## 2022-06-24 RX ADMIN — MIDAZOLAM HYDROCHLORIDE 2 MG: 1 INJECTION, SOLUTION INTRAMUSCULAR; INTRAVENOUS at 16:53

## 2022-06-24 RX ADMIN — PROPAFENONE HYDROCHLORIDE 150 MG: 150 TABLET, FILM COATED ORAL at 05:44

## 2022-06-24 ASSESSMENT — PULMONARY FUNCTION TESTS
PIF_VALUE: 27
PIF_VALUE: 28
PIF_VALUE: 14
PIF_VALUE: 29
PIF_VALUE: 14
PIF_VALUE: 27
PIF_VALUE: 28
PIF_VALUE: 24
PIF_VALUE: 15
PIF_VALUE: 25
PIF_VALUE: 25
PIF_VALUE: 10
PIF_VALUE: 25
PIF_VALUE: 24
PIF_VALUE: 13
PIF_VALUE: 25
PIF_VALUE: 24
PIF_VALUE: 15
PIF_VALUE: 13
PIF_VALUE: 11
PIF_VALUE: 13
PIF_VALUE: 29
PIF_VALUE: 13
PIF_VALUE: 25
PIF_VALUE: 28
PIF_VALUE: 14
PIF_VALUE: 25

## 2022-06-24 ASSESSMENT — PAIN SCALES - GENERAL
PAINLEVEL_OUTOF10: 4
PAINLEVEL_OUTOF10: 4

## 2022-06-24 NOTE — PROGRESS NOTES
Pt placed on previous vent settings. Pt tolerated SBT's X 8 hrs. CM-SR, VSS, pt remains afebrile, UO WNL. Pt continues to have loose stools. Beau Prost remains in place. No signs of active bleeding noted. Pt is resting & is w/out evidence of distress @ this time.

## 2022-06-24 NOTE — PROGRESS NOTES
[facility] Progress Note           Subjective/   80y.o. year old female who we are seeing in consultation for FARHEEN/CKD. HPI:  Renal function worsening with diuresis, on hold, urine output of 3.3 liters over last 24 hours. LE edema somewhat improved. ROS:  +weak, remains on ventilator, on trial, no fevers. Objective/   GEN:  Chronically ill, BP (!) 112/48 Comment: Simultaneous filing. User may not have seen previous data. Pulse 85 Comment: Simultaneous filing. User may not have seen previous data. Temp 98.3 °F (36.8 °C) (Bladder)   Resp 28 Comment: Simultaneous filing. User may not have seen previous data. Ht 5' 6\" (1.676 m)   Wt 263 lb 10.7 oz (119.6 kg)   SpO2 97%   BMI 42.56 kg/m²   HEENT: non-icteric, no JVD  CV: S1, S2 without m/r/g; ++ LE edema  RESP: CTA B without w/r/r; breathing wnl  ABD: +bs, soft, nt, no hsm  SKIN: warm, no rashes    Data/  Recent Labs     06/22/22  0555 06/22/22  0555 06/23/22  0452 06/23/22  1500 06/24/22  0430   WBC 10.3  --  9.9  --  11.1*   HGB 7.6*   < > 6.6* 9.2* 8.9*   HCT 23.4*   < > 20.6* 27.6* 27.6*   MCV 86.5  --  85.9  --  85.0     --  238  --  231    < > = values in this interval not displayed. Recent Labs     06/22/22  0555 06/22/22  0555 06/23/22  0452 06/23/22  1500 06/24/22  0430     --  134*  --  134*   K 3.8   < > 3.2* 3.5 3.6     --  103  --  103   CO2 19*  --  19*  --  19*   GLUCOSE 125*  --  161*  --  132*   MG  --   --  1.80 1.90  --    BUN 42*  --  49*  --  50*   CREATININE 2.2*  --  2.6*  --  2.6*   LABGLOM 21*  --  18*  --  18*   GFRAA 26*  --  21*  --  21*    < > = values in this interval not displayed.      Component      Latest Ref Rng & Units 6/22/2022 6/22/2022           4:10 PM  4:10 PM   Color, UA      Straw/Yellow Yellow    Clarity, UA      Clear Clear    Glucose, UA      Negative mg/dL Negative    Bilirubin, Urine      Negative Negative    Ketones, Urine      Negative mg/dL TRACE (A) Specific Gravity, UA      1.005 - 1.030 1.025    Blood, Urine      Negative Negative    pH, UA      5.0 - 8.0 5.5    Protein, UA      Negative mg/dL TRACE (A)    Urobilinogen, Urine      <2.0 E.U./dL 0.2    Nitrite, Urine      Negative Negative    Leukocyte Esterase, Urine      Negative Negative    Microscopic Examination       YES    Urine Type       NotGiven    Mucus, UA      None Seen /LPF Rare (A)    WBC, UA      0 - 5 /HPF 10-20 (A)    RBC, UA      0 - 4 /HPF 3-4    Epithelial Cells, UA      0 - 5 /HPF 0-1    Bacteria, UA      None Seen /HPF 2+ (A)    Sodium, Ur      Not Established mmol/L  <20   Potassium, Ur      Not Established mmol/L  26.3   Chloride      Not Established mmol/L  <20       Assessment/     - Acute kidney injury - multi-factorial ATN injury in setting of aspiration PNA, hypotension, & elevated vancomycin levels     - Chronic kidney disease stage 3b - background DM/HTN/CHF              SCr 1.6              Follows with [facility]     - Acute hypoxemic respiratory failure/Aspiration PNA/Pulmonary edema - on ventilator     - Diastolic CHF     - Atrial fibrillation    - Anemia - prn prbc's     - Hypertension    - Hypokalemia - prn replacement    Plan/      - Lasix drip on hold with rising SCr, completed albumin trial today, consider resuming lasix drip over weekend if SCr better & further diuresis needed  - Monitoring off of ARB  - Trend labs, bp's, & urine output     ____________________________________  Luzoris MD Britney  [facility]  [email or web address]  Office: [phone]

## 2022-06-24 NOTE — PROGRESS NOTES
Dr. Sainz  updated on pt's current ABG results & pt's condition. Pt not extubated today, but will continue with daily breathing trials. Will continue to monitor. Results for Israel Mondragon" (MRN [id number]) as of 6/24/2022 11:17   Ref.  Range 6/24/2022 10:25   Hemoglobin, Art, Extended Latest Ref Range: 12.0 - 16.0 g/dL 10.1 (L)   pH, Arterial Latest Ref Range: 7.350 - 7.450  7.299 (L)   pCO2, Arterial Latest Ref Range: 35.0 - 45.0 mmHg 41.1   pO2, Arterial Latest Ref Range: 75.0 - 108.0 mmHg 81.0   HCO3, Arterial Latest Ref Range: 21.0 - 29.0 mmol/L 19.7 (L)   TCO2 (calc), Art Latest Ref Range: Not Established mmol/L 21.0   Base Excess, Arterial Latest Ref Range: -3.0 - 3.0 mmol/L -6.3 (L)   O2 Sat, Arterial Latest Ref Range: >92 % 94.9   Methemoglobin, Arterial Latest Ref Range: <1.5 % 0.0   Carboxyhgb, Arterial Latest Ref Range: 0.0 - 1.5 % 0.3

## 2022-06-24 NOTE — PROGRESS NOTES
PROGRESS NOTE  S:81 yrs Patient  admitted on 6/17/2022 with Shortness of breath [R06.02]  Septicemia ([facility] [address] [A41.9]  Chronic anemia [D64.9]  Acute respiratory failure with hypoxia (HCC) [J96.01]  Aspiration pneumonia of left lower lobe, unspecified aspiration pneumonia type ([facility] [address] [J69.0]  Chronic kidney disease, unspecified CKD stage [N18.9] . Today she remains intubated. She is passing BMs per [employer], and tolerating TFs per OG. Nurse reports no signs of bleeding. Exam:   Vitals:    06/24/22 1400   BP: (!) 112/48   Pulse: 85   Resp: 28   Temp:    SpO2: 97%      General appearance: appears older than stated age, cooperative, fatigued, morbidly obese, pale, slowed mentation and syndromic appearance - intubated, chronically ill appearing  HEENT: Neck supple with midline trachea  Neck: no adenopathy and supple, symmetrical, trachea midline  Lungs: clear to auscultation bilaterally  Heart: regular rate and rhythm, S1, S2 normal, no murmur, click, rub or gallop  Abdomen: normal findings: bowel sounds normal, no masses palpable, soft, non-tender and symmetric and abnormal findings:  distended and obese  Extremities: edema 2+ BLE     Medications: Reviewed    Labs:  CBC:   Recent Labs     06/22/22  0555 06/22/22  0555 06/23/22  0452 06/23/22  1500 06/24/22  0430   WBC 10.3  --  9.9  --  11.1*   HGB 7.6*   < > 6.6* 9.2* 8.9*   HCT 23.4*   < > 20.6* 27.6* 27.6*   MCV 86.5  --  85.9  --  85.0     --  238  --  231    < > = values in this interval not displayed. BMP:   Recent Labs     06/22/22  0555 06/22/22  0555 06/23/22  0452 06/23/22  1500 06/24/22  0430     --  134*  --  134*   K 3.8   < > 3.2* 3.5 3.6     --  103  --  103   CO2 19*  --  19*  --  19*   BUN 42*  --  49*  --  50*   CREATININE 2.2*  --  2.6*  --  2.6*    < > = values in this interval not displayed.      LIVER PROFILE:   Recent Labs     06/23/22  0452   AST 14*   ALT <5*   PROT 5.6*   BILIDIR <0. 2   BILITOT <0.2   ALKPHOS 66     PT/INR: No results for input(s): INR in the last 72 hours. Invalid input(s): PT      IMAGING:  CT ABDOMEN PELVIS WO CONTRAST - 6/23/2022  Impression   Colonic diverticulosis without acute diverticulitis.  Nonspecific rectal wall   thickening.  Partially visualized pleural effusions with adjacent compressive   atelectasis. Attending Supervising [de-identified] Attestation Statement  The patient is a 80 y.o. female. I have performed a history and physical examination of the patient. I discussed the case with my physician assistant Kirstie Gambino PA-C    I reviewed the patient's Past Medical History, Past Surgical History, Medications, and Allergies. Physical Exam:  Vitals:    06/24/22 1600 06/24/22 1658 06/24/22 1700 06/24/22 1800   BP: (!) 120/52  (!) 112/46 (!) 91/59   Pulse: 78 77 76 73   Resp: 30 (!) 31 20 20   Temp: 99.2 °F (37.3 °C)      TempSrc: Bladder      SpO2: 97% 96% 96% 97%   Weight:       Height:           Physical Examination: General appearance - ill-appearing  Mental status - intubated and sedated  Eyes - sclera anicteric  Neck - supple, no significant adenopathy  Chest - no tachypnea, retractions or cyanosis  Heart - normal rate and regular rhythm  Abdomen - soft, nontender, nondistended  Extremities - 2+pedal edema noted          Impression: 80year old female with a history of HTN, CKD, Parkinson's, A fib, CHF, chronic anemia, and BETSY admitted with respiratory failure requiring intubation. Hospitalization c/b acute on chronic anemia requiring 2U PRBCs on 6/23. Recommendation:  1. Continue supportive care  2. Monitor Hgb  3. Observe for signs of bleeding  4. Monitor and document output  5. Pantoprazole 40 mg BID  6. Consider IV iron supplementation daily   7. Continue TFs per OG as tolerated   8. Nephrology following   9. Will sign off  10. Call with questions  11.  Recommend colonoscopy +/- EGD as outpatient      Tina Motley PA-C  2:58 PM 6/24/2022                      79-year-old female with a history of HTN, HLD, DM, CKD, asthma, atrial fibrillation, carotid stenosis, chronic back pain, DDD, and hypothyroidism admitted with acute respiratory failure and aspiration pneumonia. She has known history of chronic anemia. Her acute anemia is most likely multifactorial including anemia of chronic disease, and possible GI bleed. No evidence of active bleeding. CT abd negative     Continue supportive care. PPI BID. High risk/Low yield for endoscopy in this patient without signs of active bleeding. Will sign off.  Call with questions    Magui Johnston MD          (O) [phone]  Jimbo Rios

## 2022-06-24 NOTE — PROGRESS NOTES
06/24/22 1921   NICU Vent Information   Vent Type 980   Vent Mode AC/VC   Vt (Set, mL) 380 mL   Vt Exhaled 374 mL   Resp Rate (Set) 20 bmp   Rate Measured 20 br/min   Minute Volume 7.48 Liters   Peak Flow 50 L/min   Pressure Support 0 cmH20   FiO2  35 %   Peak Inspiratory Pressure 28 cmH2O   I:E Ratio 1:2.70   Sensitivity 3   High Peep/I Pressure 0   PEEP/CPAP (cmH2O) 5   I Time/ I Time % 0 s   Mean Airway Pressure 11 cmH20   Plateau Pressure 23 ALA52   Static Compliance 20 mL/cmH2O   Dynamic Compliance 17 mL/cmH2O   Cough/Sputum   Sputum How Obtained Endotracheal   Cough Productive   Sputum Amount Moderate   Sputum Color Creamy   Tenacity Thick   Breath Sounds   Right Upper Lobe Diminished   Right Middle Lobe Diminished   Right Lower Lobe Diminished   Left Upper Lobe Diminished   Left Lower Lobe Diminished   Additional Respiratory Assessments   Heart Rate 70   Resp 20   SpO2 97 %   Position Semi-Mccollum's   Humidification Source Heated wire   Humidification Temp 36.8   Circuit Condensation Drained   Vent Alarm Settings   High Pressure  40 cmH2O   Low Minute Volume Alarm 4 L/min   Ve Min 4   Low Exhaled Vt  250 mL   RR High 40 br/min   Apnea (Secs) 20 secs   Ambu Bag With Mask At Bedside Yes   Patient Observation   Observations 7.5 ett 24 at lip

## 2022-06-24 NOTE — PLAN OF CARE
Problem: Discharge Planning  Goal: Discharge to home or other facility with appropriate resources  Outcome: Progressing  Flowsheets (Taken 6/23/2022 2000)  Discharge to home or other facility with appropriate resources: Identify barriers to discharge with patient and caregiver     Problem: Pain  Goal: Verbalizes/displays adequate comfort level or baseline comfort level  Outcome: Progressing  Flowsheets (Taken 6/23/2022 2000)  Verbalizes/displays adequate comfort level or baseline comfort level:   Encourage patient to monitor pain and request assistance   Assess pain using appropriate pain scale   Administer analgesics based on type and severity of pain and evaluate response   Implement non-pharmacological measures as appropriate and evaluate response     Problem: ABCDS Injury Assessment  Goal: Absence of physical injury  Outcome: Progressing     Problem: Skin/Tissue Integrity  Goal: Absence of new skin breakdown  Description: 1. Monitor for areas of redness and/or skin breakdown  2. Assess vascular access sites hourly  3. Every 4-6 hours minimum:  Change oxygen saturation probe site  4. Every 4-6 hours:  If on nasal continuous positive airway pressure, respiratory therapy assess nares and determine need for appliance change or resting period.   Outcome: Progressing     Problem: Chronic Conditions and Co-morbidities  Goal: Patient's chronic conditions and co-morbidity symptoms are monitored and maintained or improved  Outcome: Progressing  Flowsheets (Taken 6/23/2022 2000)  Care Plan - Patient's Chronic Conditions and Co-Morbidity Symptoms are Monitored and Maintained or Improved: Monitor and assess patient's chronic conditions and comorbid symptoms for stability, deterioration, or improvement     Problem: Safety - Adult  Goal: Free from fall injury  Outcome: Progressing     Problem: Safety - Medical Restraint  Goal: Remains free of injury from restraints (Restraint for Interference with Medical Device)  Description: INTERVENTIONS:  1. Determine that other, less restrictive measures have been tried or would not be effective before applying the restraint  2. Evaluate the patient's condition at the time of restraint application  3. Inform patient/family regarding the reason for restraint  4. Q2H: Monitor safety, psychosocial status, comfort, nutrition and hydration  Outcome: Progressing  Flowsheets  Taken 6/23/2022 2000 by Monica Hoang RN  Remains free of injury from restraints (restraint for interference with medical device):   Determine that other, less restrictive measures have been tried or would not be effective before applying the restraint   Evaluate the patient's condition at the time of restraint application   Inform patient/family regarding the reason for restraint   Every 2 hours: Monitor safety, psychosocial status, comfort, nutrition and hydration  Taken 6/23/2022 1200 by Durham Florida, RN  Remains free of injury from restraints (restraint for interference with medical device): Determine that other, less restrictive measures have been tried or would not be effective before applying the restraint  Taken 6/23/2022 0800 by Durham Florida, RN  Remains free of injury from restraints (restraint for interference with medical device): Determine that other, less restrictive measures have been tried or would not be effective before applying the restraint     Problem: Nutrition Deficit:  Goal: Optimize nutritional status  Outcome: Progressing     Pt resting in bed tolerating vent settings, IV's infusing, TF infusing, archuleta and FMS patent, b/l soft wrist restraints assessed, no s/s of distress att.

## 2022-06-24 NOTE — PROGRESS NOTES
pressure injury on R buttocks)       Current Nutrition Intake & Therapies:    Average Meal Intake: NPO  Average Supplements Intake: NPO  Current Tube Feeding (TF) Orders:  · Feeding Route: Orogastric  · Formula: Peptide Based High Protein  · Schedule: Continuous  · Feeding Regimen: Vital High-Protein with a goal rate of 35 ml/hr x 20 hours  · Additives/Modulars: Protein (one proteinex P2Go once daily)  · Water Flushes: per Dr. Wiliam Vang, normal saline water flushes of 30 ml every 6 hours  · Current TF & Flush Orders Provides: TF was infusing at goal rate prior to being held for SBT this am  · Goal TF & Flush Orders Provides: Vital High-Protein with a goal rate of 35 ml/hr x 20 hours = 700 ml TV, 700 kcals, 61 g protein, and 585 ml free water + 26 g protein and 104 kcals from one proteinex P2Go once daily (87 g protein and 804 kcals total) + 30 ml normal saline water flushes every 6 hours, per Dr. Wiliam Vagn      Anthropometric Measures:  Height: 5' 6\" (167.6 cm)  Ideal Body Weight (IBW): 130 lbs (59 kg)    Admission Body Weight: 270 lb (122.5 kg) (obtained on 6/18/22; actual weight)  Current Body Weight: 263 lb 10.7 oz (119.6 kg) (obtained on 6/24/22; actual weight), 202.8 % IBW.  Weight Source: Bed Scale  Current BMI (kg/m2): 42.6  Usual Body Weight: 270 lb (122.5 kg) (obtained on 6/18/22; actual weight)  % Weight Change (Calculated): -2.3  Weight Adjustment For: No Adjustment                 BMI Categories: Obese Class 3 (BMI 40.0 or greater) (42.56)    Estimated Daily Nutrient Needs:  Energy Requirements Based On: Kcal/kg  Weight Used for Energy Requirements: Current  Energy (kcal/day): 960 - 1320 kcals based on 8-11 kcals/kg/CBW  Weight Used for Protein Requirements: Ideal  Protein (g/day): 148 - 159 g protein based on 2.5-2.7 g/kg/IBW  Method Used for Fluid Requirements: 1 ml/kcal  Fluid (ml/day): 960 - 1320 ml    Nutrition Diagnosis:   · Inadequate oral intake related to inadequate protein-energy intake,impaired respiratory function as evidenced by NPO or clear liquid status due to medical condition,intubation,nutrition support - enteral nutrition,lab values,swallow study results      Nutrition Interventions:   Food and/or Nutrient Delivery: Continue NPO,Continue Current Tube Feeding  Nutrition Education/Counseling: No recommendation at this time  Coordination of Nutrition Care: Continue to monitor while inpatient,Interdisciplinary Rounds  Plan of Care discussed with: ICU Team    Goals:  Previous Goal Met: Goal(s) Achieved  Goals: Tolerate nutrition support at goal rate       Nutrition Monitoring and Evaluation:   Behavioral-Environmental Outcomes: None Identified  Food/Nutrient Intake Outcomes: Enteral Nutrition Intake/Tolerance  Physical Signs/Symptoms Outcomes: Biochemical Data,Hemodynamic Status,Nutrition Focused Physical Findings,Skin,Weight    Discharge Planning:     Too soon to determine     Zekelisaronnie Villa, [address], [place]  Contact: [phone]

## 2022-06-24 NOTE — PROGRESS NOTES
Pt returned to previous [facility] settings. Chelle PS 8/5 well x 9hrs. Pt received prn sedation med and Vt dropped to <200ml         06/24/22 1658   NICU Vent Information   Vt (Set, mL) 380 mL   Vt Exhaled 366 mL   Resp Rate (Set) 20 bmp   Rate Measured 20 br/min   Minute Volume 7.64 Liters   Peak Flow 50 L/min   Pressure Support 0 cmH20   FiO2  35 %   Peak Inspiratory Pressure 28 cmH2O   I:E Ratio 1:2.70   Sensitivity 3   High Peep/I Pressure 0   PEEP/CPAP (cmH2O) 5   I Time/ I Time % 0 s   Mean Airway Pressure 11 cmH20   Cough/Sputum   Sputum How Obtained None   Breath Sounds   Right Upper Lobe Diminished   Right Middle Lobe Diminished   Right Lower Lobe Diminished   Left Upper Lobe Diminished   Left Lower Lobe Diminished   Additional Respiratory Assessments   Heart Rate 77   Resp (!) 31   SpO2 96 %   Position Semi-Mccollum's   Humidification Source Heated wire   Humidification Temp 37   Vent Alarm Settings   High Pressure  40 cmH2O   Low Minute Volume Alarm 4 L/min   RR High 40 br/min   Apnea (Secs) 20 secs   Ambu Bag With Mask At Bedside Yes   ETT (adult)   Placement Date/Time: 06/18/22 1650   Present on Admission/Arrival: No  Placement Verified By: Direct visualization;Colorimetric ETCO2 device  Preoxygenation: Yes  Airway Tube Size: 8 mm  Laryngoscope: GlideScope  Blade Size: 3  Location: Oral  Secured. ..    Secured At 24 cm   Measured From Lips   ETT Placement Center   Secured By Commercial tube hughes   Site Assessment Dry

## 2022-06-24 NOTE — CARE COORDINATION
INTERDISCIPLINARY PLAN OF CARE CONFERENCE    Date/Time: 6/24/2022 8:53 AM  Completed by: JENNIFER Art   Case Management    Patient Name:  Madonna Shannon  YOB: 1940  Admitting Diagnosis: Shortness of breath [R06.02]  Septicemia ([facility] [address] [A41.9]  Chronic anemia [D64.9]  Acute respiratory failure with hypoxia ([facility] [address] [J96.01]  Aspiration pneumonia of left lower lobe, unspecified aspiration pneumonia type ([facility] [address] [J69.0]  Chronic kidney disease, unspecified CKD stage [N18.9]     Admit Date/Time:  6/17/2022 12:50 AM    Chart reviewed. Interdisciplinary team contacted or reviewed plan related to patient progress and discharge plans. Disciplines included Case Management, Nursing, and Dietitian. Current Status:Ongoing   PT/OT recommendation for discharge plan of care: TBD    Expected D/C Disposition:  Goal is home   Confirmed plan with patient and/or family Yes confirmed with: (name) pt's  Fredy Preston at bedside    Discharge Plan Comments: Chart review completed. Met with pt's  Fredy Preston at bedside as pt remains on a Vent. Fredy Preston stated the goal is for pt to return home with [facility] if able but stated pt may need SNF. He stated that pt has been at [facility] in the past and that is where pt would want to go if needed. He again stated that if [facility] is able to do it, then she will return home. He denied needs for CM. Completed Interdisciplinary rounds with ICU staff. CM will continue to follow and assist. Please notify CM if needs or concerns arise.     Home O2 in place on admit: yes at night through [facility]

## 2022-06-24 NOTE — PROGRESS NOTES
06/24/22 1900   RT Protocol   History Pulmonary Disease 2   Respiratory pattern 4   Breath sounds 2   Cough 2   Indications for Bronchodilator Therapy Decreased or absent breath sounds   Bronchodilator Assessment Score 10   RT Inhaler-Nebulizer Bronchodilator Protocol Note    There is a bronchodilator order in the chart from a provider indicating to follow the RT Bronchodilator Protocol and there is an Initiate RT Inhaler-Nebulizer Bronchodilator Protocol order as well (see protocol at bottom of note). CXR Findings:  XR CHEST PORTABLE    Result Date: 6/24/2022  No significant interval change in basilar predominant airspace disease and small left greater than right pleural effusions. Pulmonary vascular congestion. XR CHEST PORTABLE    Result Date: 6/23/2022  Again identified is bibasilar airspace disease with small pleural effusions and mild increased right perihilar infiltrates. This may be related to atelectasis or pneumonia. Cardiomegaly. The findings from the last RT Protocol Assessment were as follows:   History Pulmonary Disease: Chronic pulmonary disease  Respiratory Pattern: Mild dyspnea at rest, irregular pattern, or RR 21-25 bpm  Breath Sounds: Slightly diminished and/or crackles  Cough: Weak, productive  Indication for Bronchodilator Therapy: Decreased or absent breath sounds  Bronchodilator Assessment Score: 10    Aerosolized bronchodilator medication orders have been revised according to the RT Inhaler-Nebulizer Bronchodilator Protocol below. Respiratory Therapist to perform RT Therapy Protocol Assessment initially then follow the protocol. Repeat RT Therapy Protocol Assessment PRN for score 0-3 or on second treatment, BID, and PRN for scores above 3. No Indications - adjust the frequency to every 6 hours PRN wheezing or bronchospasm, if no treatments needed after 48 hours then discontinue using Per Protocol order mode.      If indication present, adjust the RT bronchodilator orders based on the Bronchodilator Assessment Score as indicated below. Use Inhaler orders unless patient has one or more of the following: on home nebulizer, not able to hold breath for 10 seconds, is not alert and oriented, cannot activate and use MDI correctly, or respiratory rate 25 breaths per minute or more, then use the equivalent nebulizer order(s) with same Frequency and PRN reasons based on the score. If a patient is on this medication at home then do not decrease Frequency below that used at home. 0-3 - enter or revise RT bronchodilator order(s) to equivalent RT Bronchodilator order with Frequency of every 4 hours PRN for wheezing or increased work of breathing using Per Protocol order mode. 4-6 - enter or revise RT Bronchodilator order(s) to two equivalent RT bronchodilator orders with one order with BID Frequency and one order with Frequency of every 4 hours PRN wheezing or increased work of breathing using Per Protocol order mode. 7-10 - enter or revise RT Bronchodilator order(s) to two equivalent RT bronchodilator orders with one order with TID Frequency and one order with Frequency of every 4 hours PRN wheezing or increased work of breathing using Per Protocol order mode. 11-13 - enter or revise RT Bronchodilator order(s) to one equivalent RT bronchodilator order with QID Frequency and an Albuterol order with Frequency of every 4 hours PRN wheezing or increased work of breathing using Per Protocol order mode. Greater than 13 - enter or revise RT Bronchodilator order(s) to one equivalent RT bronchodilator order with every 4 hours Frequency and an Albuterol order with Frequency of every 2 hours PRN wheezing or increased work of breathing using Per Protocol order mode.      Electronically signed by Carolina Lopez RCP on 6/24/2022 at 7:29 PM

## 2022-06-24 NOTE — PROGRESS NOTES
Pulmonary & Critical Care Medicine ICU Progress Note    CC: shortness of breath     Events of Last 24 hours:   Seen by GI  Was net negative, Lasix drip was stopped    Vascular lines: IV:   2022 PICC    MV: 2022 after failing Vapotherm    Resp Rate (Set): 20 bmp/Vt (Set, mL): 380 mL/ /FiO2 : 35 %  Recent Labs     22  0540 22  0430   PHART 7.314* 7.328*   OZV6HSU 33.8* 37.2   PO2ART 78.6 84.1       IV:   sodium chloride      sodium chloride      dexmedetomidine HCl in NaCl 1 mcg/kg/hr (22 0454)    sodium chloride      sodium chloride         Vitals:  Blood pressure (!) 131/53, pulse 73, temperature 97.9 °F (36.6 °C), temperature source Bladder, resp. rate 20, height 5' 6\" (1.676 m), weight 263 lb 10.7 oz (119.6 kg), SpO2 97 %, not currently breastfeeding. on vent  Temp  Av °F (36.7 °C)  Min: 97.5 °F (36.4 °C)  Max: 98.5 °F (36.9 °C)    Intake/Output Summary (Last 24 hours) at 2022 0610  Last data filed at 2022 0427  Gross per 24 hour   Intake 2229.38 ml   Output 3495 ml   Net -1265.62 ml     PE:  General: intubated, ill appearing    ENT: Pharynx with ETT. Resp: No crackles. No wheezing. CV: S1, S2. + edema  GI: NT, ND, +BS  Skin: Warm and dry. Neuro: PERRL. Awake and following commands.  Patellar reflexes are symmetric      Scheduled Meds:   pantoprazole  40 mg IntraVENous BID    cefepime  1,000 mg IntraVENous Q12H    albumin human  25 g IntraVENous Q8H    ipratropium-albuterol  1 ampule Inhalation Q4H    lidocaine 1 % injection  5 mL IntraDERmal Once    mupirocin   Nasal BID    chlorhexidine  15 mL Mouth/Throat BID    sodium chloride flush  10 mL IntraVENous 2 times per day    [Held by provider] apixaban  2.5 mg Oral BID    atorvastatin  40 mg Oral Nightly    carbidopa-levodopa  1 tablet Oral 4x Daily    DULoxetine  60 mg Oral Daily    budesonide-formoterol  2 puff Inhalation BID    lamoTRIgine  50 mg Oral BID    montelukast  10 mg Oral Daily    propafenone  150 mg Oral 3 times per day       Data:  CBC:   Recent Labs     06/22/22  0555 06/22/22  0555 06/23/22  0452 06/23/22  1500 06/24/22  0430   WBC 10.3  --  9.9  --  11.1*   HGB 7.6*   < > 6.6* 9.2* 8.9*   HCT 23.4*   < > 20.6* 27.6* 27.6*   MCV 86.5  --  85.9  --  85.0     --  238  --  231    < > = values in this interval not displayed. BMP:   Recent Labs     06/22/22  0555 06/22/22  0555 06/23/22  0452 06/23/22  1500 06/24/22  0430     --  134*  --  134*   K 3.8   < > 3.2* 3.5 3.6     --  103  --  103   CO2 19*  --  19*  --  19*   BUN 42*  --  49*  --  50*   CREATININE 2.2*  --  2.6*  --  2.6*    < > = values in this interval not displayed. LIVER PROFILE:   Recent Labs     06/23/22 0452   AST 14*   ALT <5*   BILIDIR <0.2   BILITOT <0.2   ALKPHOS 77       Microbiology:  6/17/2022 SARS-CoV-2 and influenza are negative  6/17/2022 MRSA DNA screen positive  6/17/2022 blood NG  6/17/2022 sputum NRF  6/18/2022 tracheal aspirate NRF  6/19/2022 BAL Candida  6/19/2022 respiratory viral panel negative    Imaging:  CXR 6/24/2022 small effusions ET tube okay    ACT 6/23/22   Small right and very small effusions, diverticulosis, nonspecific rectal wall thickening      ASSESSMENT:  · Acute hypoxemic respiratory failure  · Aspiration pneumonia  · BETSY on BiPAP 22/15 with 1 L bleed in   · Parkinson's disease  · Atrial fibrillation  · Acute on chronic kidney failure, baseline creatinine 1.6  · Acute on chronic anemia, s/p 2 U PRBC 6/23/2022  · H/O esophagitis  · Pleural effusions are small bilaterally    PLAN:  Mechanical ventilation as per my orders. The ventilator was adjusted by me at the bedside for unstable, life threatening respiratory failure. IV Precedex for sedation, target RASS -2, with daily spontaneous awakening trial.  Fentanyl gtt as needed.    Head of bed 30 degrees or higher at all times  · Daily spontaneous breathing trial  · Antibiotic day #8, now cefepime & vancomycin  · Inhaled bronchodilators  · Protonix twice daily, Eliquis is held, GI following   · Lasix drip is now off, nephrology following  · Prophylaxis: SCD, Bactroban, Protonix BID    Total critical care time caring for this patient with life threatening, unstable organ failure, including direct patient contact, management of life support systems, review of data including imaging and labs, discussions with other team members and physicians is 32 minutes so far today, excluding procedures. 147

## 2022-06-24 NOTE — PROGRESS NOTES
RT Nebulizer Bronchodilator Protocol Note    There is a bronchodilator order in the chart from a provider indicating to follow the RT Bronchodilator Protocol and there is an Initiate RT Bronchodilator Protocol order as well (see protocol at bottom of note). CXR Findings:  XR CHEST PORTABLE    Result Date: 6/23/2022  Again identified is bibasilar airspace disease with small pleural effusions and mild increased right perihilar infiltrates. This may be related to atelectasis or pneumonia. Cardiomegaly. The findings from the last RT Protocol Assessment were as follows:  Smoking: (P) Chronic pulmonary disease  Respiratory Pattern: (P) Mild dyspnea at rest, irregular pattern, or RR 21-25 bpm  Breath Sounds: (P) Slightly diminished and/or crackles  Cough: (P) Weak, productive  Indication for Bronchodilator Therapy: (P) Decreased or absent breath sounds  Bronchodilator Assessment Score: (P) 10    Aerosolized bronchodilator medication orders have been revised according to the RT Nebulizer Bronchodilator Protocol below. Respiratory Therapist to perform RT Therapy Protocol Assessment initially then follow the protocol. Repeat RT Therapy Protocol Assessment PRN for score 0-3 or on second treatment, BID, and PRN for scores above 3. No Indications - adjust the frequency to every 6 hours PRN wheezing or bronchospasm, if no treatments needed after 48 hours then discontinue using Per Protocol order mode. If indication present, adjust the RT bronchodilator orders based on the Bronchodilator Assessment Score as indicated below. If a patient is on this medication at home then do not decrease Frequency below that used at home. 0-3 - enter or revise RT bronchodilator order(s) to equivalent RT Bronchodilator order with Frequency of every 4 hours PRN for wheezing or increased work of breathing using Per Protocol order mode.        4-6 - enter or revise RT Bronchodilator order(s) to two equivalent RT bronchodilator orders with one order with BID Frequency and one order with Frequency of every 4 hours PRN wheezing or increased work of breathing using Per Protocol order mode. 7-10 - enter or revise RT Bronchodilator order(s) to two equivalent RT bronchodilator orders with one order with TID Frequency and one order with Frequency of every 4 hours PRN wheezing or increased work of breathing using Per Protocol order mode. 11-13 - enter or revise RT Bronchodilator order(s) to one equivalent RT bronchodilator order with QID Frequency and an Albuterol order with Frequency of every 4 hours PRN wheezing or increased work of breathing using Per Protocol order mode. Greater than 13 - enter or revise RT Bronchodilator order(s) to one equivalent RT bronchodilator order with every 4 hours Frequency and an Albuterol order with Frequency of every 2 hours PRN wheezing or increased work of breathing using Per Protocol order mode. RT to enter RT Home Evaluation for COPD & MDI Assessment order using Per Protocol order mode.     Electronically signed by Allyson Ervin RCP on 6/24/2022 at 8:21 AM

## 2022-06-25 ENCOUNTER — APPOINTMENT (OUTPATIENT)
Dept: GENERAL RADIOLOGY | Age: 82
DRG: 004 | End: 2022-06-25
Payer: MEDICARE

## 2022-06-25 LAB
ALBUMIN SERPL-MCNC: 3.2 G/DL (ref 3.4–5)
ANION GAP SERPL CALCULATED.3IONS-SCNC: 13 MMOL/L (ref 3–16)
BASE EXCESS ARTERIAL: -5.1 MMOL/L (ref -3–3)
BASOPHILS ABSOLUTE: 0 K/UL (ref 0–0.2)
BASOPHILS RELATIVE PERCENT: 0.1 %
BUN BLDV-MCNC: 52 MG/DL (ref 7–20)
CALCIUM IONIZED: 0.91 MMOL/L (ref 1.12–1.32)
CALCIUM SERPL-MCNC: 6.8 MG/DL (ref 8.3–10.6)
CARBOXYHEMOGLOBIN ARTERIAL: 0.4 % (ref 0–1.5)
CHLORIDE BLD-SCNC: 104 MMOL/L (ref 99–110)
CO2: 20 MMOL/L (ref 21–32)
CREAT SERPL-MCNC: 2.6 MG/DL (ref 0.6–1.2)
EOSINOPHILS ABSOLUTE: 0.3 K/UL (ref 0–0.6)
EOSINOPHILS RELATIVE PERCENT: 2.8 %
GFR AFRICAN AMERICAN: 21
GFR NON-AFRICAN AMERICAN: 18
GLUCOSE BLD-MCNC: 142 MG/DL (ref 70–99)
GLUCOSE BLD-MCNC: 155 MG/DL (ref 70–99)
GLUCOSE BLD-MCNC: 163 MG/DL (ref 70–99)
GLUCOSE BLD-MCNC: 166 MG/DL (ref 70–99)
HCO3 ARTERIAL: 20.2 MMOL/L (ref 21–29)
HCT VFR BLD CALC: 27.1 % (ref 36–48)
HEMOGLOBIN, ART, EXTENDED: 10.6 G/DL (ref 12–16)
HEMOGLOBIN: 9 G/DL (ref 12–16)
LYMPHOCYTES ABSOLUTE: 0.6 K/UL (ref 1–5.1)
LYMPHOCYTES RELATIVE PERCENT: 6.6 %
MAGNESIUM: 1.7 MG/DL (ref 1.8–2.4)
MCH RBC QN AUTO: 28.8 PG (ref 26–34)
MCHC RBC AUTO-ENTMCNC: 33.3 G/DL (ref 31–36)
MCV RBC AUTO: 86.4 FL (ref 80–100)
METHEMOGLOBIN ARTERIAL: 0.3 %
MONOCYTES ABSOLUTE: 0.9 K/UL (ref 0–1.3)
MONOCYTES RELATIVE PERCENT: 9.3 %
NEUTROPHILS ABSOLUTE: 7.9 K/UL (ref 1.7–7.7)
NEUTROPHILS RELATIVE PERCENT: 81.2 %
O2 SAT, ARTERIAL: 95.7 %
O2 THERAPY: ABNORMAL
PCO2 ARTERIAL: 38.1 MMHG (ref 35–45)
PDW BLD-RTO: 14.4 % (ref 12.4–15.4)
PERFORMED ON: ABNORMAL
PH ARTERIAL: 7.34 (ref 7.35–7.45)
PH VENOUS: 7.32 (ref 7.35–7.45)
PHOSPHORUS: 1.7 MG/DL (ref 2.5–4.9)
PLATELET # BLD: 245 K/UL (ref 135–450)
PMV BLD AUTO: 7.5 FL (ref 5–10.5)
PO2 ARTERIAL: 82.8 MMHG (ref 75–108)
POTASSIUM SERPL-SCNC: 3.1 MMOL/L (ref 3.5–5.1)
RBC # BLD: 3.14 M/UL (ref 4–5.2)
SODIUM BLD-SCNC: 137 MMOL/L (ref 136–145)
TCO2 ARTERIAL: 21.4 MMOL/L
WBC # BLD: 9.8 K/UL (ref 4–11)

## 2022-06-25 PROCEDURE — 99233 SBSQ HOSP IP/OBS HIGH 50: CPT | Performed by: INTERNAL MEDICINE

## 2022-06-25 PROCEDURE — 2000000000 HC ICU R&B

## 2022-06-25 PROCEDURE — 99291 CRITICAL CARE FIRST HOUR: CPT | Performed by: INTERNAL MEDICINE

## 2022-06-25 PROCEDURE — 6360000002 HC RX W HCPCS: Performed by: INTERNAL MEDICINE

## 2022-06-25 PROCEDURE — 2700000000 HC OXYGEN THERAPY PER DAY

## 2022-06-25 PROCEDURE — 2500000003 HC RX 250 WO HCPCS: Performed by: INTERNAL MEDICINE

## 2022-06-25 PROCEDURE — 94761 N-INVAS EAR/PLS OXIMETRY MLT: CPT

## 2022-06-25 PROCEDURE — 82803 BLOOD GASES ANY COMBINATION: CPT

## 2022-06-25 PROCEDURE — 2580000003 HC RX 258: Performed by: INTERNAL MEDICINE

## 2022-06-25 PROCEDURE — 94640 AIRWAY INHALATION TREATMENT: CPT

## 2022-06-25 PROCEDURE — 80069 RENAL FUNCTION PANEL: CPT

## 2022-06-25 PROCEDURE — 6370000000 HC RX 637 (ALT 250 FOR IP): Performed by: INTERNAL MEDICINE

## 2022-06-25 PROCEDURE — C9113 INJ PANTOPRAZOLE SODIUM, VIA: HCPCS | Performed by: INTERNAL MEDICINE

## 2022-06-25 PROCEDURE — 85025 COMPLETE CBC W/AUTO DIFF WBC: CPT

## 2022-06-25 PROCEDURE — 36415 COLL VENOUS BLD VENIPUNCTURE: CPT

## 2022-06-25 PROCEDURE — 71045 X-RAY EXAM CHEST 1 VIEW: CPT

## 2022-06-25 PROCEDURE — 94003 VENT MGMT INPAT SUBQ DAY: CPT

## 2022-06-25 PROCEDURE — 83735 ASSAY OF MAGNESIUM: CPT

## 2022-06-25 PROCEDURE — 82330 ASSAY OF CALCIUM: CPT

## 2022-06-25 RX ORDER — MAGNESIUM SULFATE IN WATER 40 MG/ML
2000 INJECTION, SOLUTION INTRAVENOUS ONCE
Status: COMPLETED | OUTPATIENT
Start: 2022-06-25 | End: 2022-06-25

## 2022-06-25 RX ORDER — CALCIUM GLUCONATE 20 MG/ML
1000 INJECTION, SOLUTION INTRAVENOUS ONCE
Status: COMPLETED | OUTPATIENT
Start: 2022-06-25 | End: 2022-06-25

## 2022-06-25 RX ORDER — CALCIUM GLUCONATE 94 MG/ML
1000 INJECTION, SOLUTION INTRAVENOUS ONCE
Status: DISCONTINUED | OUTPATIENT
Start: 2022-06-25 | End: 2022-06-25 | Stop reason: CLARIF

## 2022-06-25 RX ADMIN — IRON SUCROSE 100 MG: 20 INJECTION, SOLUTION INTRAVENOUS at 08:03

## 2022-06-25 RX ADMIN — Medication 15 ML: at 22:06

## 2022-06-25 RX ADMIN — PROPAFENONE HYDROCHLORIDE 150 MG: 150 TABLET, FILM COATED ORAL at 13:53

## 2022-06-25 RX ADMIN — CARBIDOPA AND LEVODOPA 1 TABLET: 25; 100 TABLET ORAL at 22:06

## 2022-06-25 RX ADMIN — CARBIDOPA AND LEVODOPA 1 TABLET: 25; 100 TABLET ORAL at 08:49

## 2022-06-25 RX ADMIN — CARBIDOPA AND LEVODOPA 1 TABLET: 25; 100 TABLET ORAL at 16:53

## 2022-06-25 RX ADMIN — MUPIROCIN: 20 OINTMENT TOPICAL at 22:08

## 2022-06-25 RX ADMIN — Medication 0.8 MCG/KG/HR: at 11:10

## 2022-06-25 RX ADMIN — SODIUM CHLORIDE, PRESERVATIVE FREE 10 ML: 5 INJECTION INTRAVENOUS at 09:00

## 2022-06-25 RX ADMIN — CALCIUM GLUCONATE 1000 MG: 20 INJECTION, SOLUTION INTRAVENOUS at 11:21

## 2022-06-25 RX ADMIN — IPRATROPIUM BROMIDE AND ALBUTEROL SULFATE 1 AMPULE: 2.5; .5 SOLUTION RESPIRATORY (INHALATION) at 16:06

## 2022-06-25 RX ADMIN — IPRATROPIUM BROMIDE AND ALBUTEROL SULFATE 1 AMPULE: 2.5; .5 SOLUTION RESPIRATORY (INHALATION) at 19:15

## 2022-06-25 RX ADMIN — Medication 0.8 MCG/KG/HR: at 21:05

## 2022-06-25 RX ADMIN — MAGNESIUM SULFATE HEPTAHYDRATE 2000 MG: 40 INJECTION, SOLUTION INTRAVENOUS at 12:05

## 2022-06-25 RX ADMIN — IPRATROPIUM BROMIDE AND ALBUTEROL SULFATE 1 AMPULE: 2.5; .5 SOLUTION RESPIRATORY (INHALATION) at 11:55

## 2022-06-25 RX ADMIN — IPRATROPIUM BROMIDE AND ALBUTEROL SULFATE 1 AMPULE: 2.5; .5 SOLUTION RESPIRATORY (INHALATION) at 03:10

## 2022-06-25 RX ADMIN — CEFEPIME HYDROCHLORIDE 1000 MG: 1 INJECTION, POWDER, FOR SOLUTION INTRAMUSCULAR; INTRAVENOUS at 08:51

## 2022-06-25 RX ADMIN — CEFEPIME HYDROCHLORIDE 1000 MG: 1 INJECTION, POWDER, FOR SOLUTION INTRAMUSCULAR; INTRAVENOUS at 22:04

## 2022-06-25 RX ADMIN — SODIUM CHLORIDE 30 MG/ML INHALATION SOLUTION 4 ML: 30 SOLUTION INHALANT at 07:40

## 2022-06-25 RX ADMIN — Medication 2 PUFF: at 07:35

## 2022-06-25 RX ADMIN — PROPAFENONE HYDROCHLORIDE 150 MG: 150 TABLET, FILM COATED ORAL at 22:08

## 2022-06-25 RX ADMIN — IPRATROPIUM BROMIDE AND ALBUTEROL SULFATE 1 AMPULE: 2.5; .5 SOLUTION RESPIRATORY (INHALATION) at 23:10

## 2022-06-25 RX ADMIN — Medication 15 ML: at 08:48

## 2022-06-25 RX ADMIN — LAMOTRIGINE 50 MG: 25 TABLET ORAL at 09:00

## 2022-06-25 RX ADMIN — DULOXETINE HYDROCHLORIDE 60 MG: 60 CAPSULE, DELAYED RELEASE ORAL at 08:48

## 2022-06-25 RX ADMIN — CARBIDOPA AND LEVODOPA 1 TABLET: 25; 100 TABLET ORAL at 12:58

## 2022-06-25 RX ADMIN — Medication 0.8 MCG/KG/HR: at 06:16

## 2022-06-25 RX ADMIN — ATORVASTATIN CALCIUM 40 MG: 40 TABLET, FILM COATED ORAL at 22:06

## 2022-06-25 RX ADMIN — IPRATROPIUM BROMIDE AND ALBUTEROL SULFATE 1 AMPULE: 2.5; .5 SOLUTION RESPIRATORY (INHALATION) at 07:34

## 2022-06-25 RX ADMIN — PROPAFENONE HYDROCHLORIDE 150 MG: 150 TABLET, FILM COATED ORAL at 09:00

## 2022-06-25 RX ADMIN — PANTOPRAZOLE SODIUM 40 MG: 40 INJECTION, POWDER, LYOPHILIZED, FOR SOLUTION INTRAVENOUS at 08:48

## 2022-06-25 RX ADMIN — MUPIROCIN: 20 OINTMENT TOPICAL at 09:07

## 2022-06-25 RX ADMIN — SODIUM CHLORIDE, PRESERVATIVE FREE 10 ML: 5 INJECTION INTRAVENOUS at 22:07

## 2022-06-25 RX ADMIN — PROPAFENONE HYDROCHLORIDE 150 MG: 150 TABLET, FILM COATED ORAL at 05:32

## 2022-06-25 RX ADMIN — Medication 0.8 MCG/KG/HR: at 15:52

## 2022-06-25 RX ADMIN — LAMOTRIGINE 50 MG: 25 TABLET ORAL at 22:09

## 2022-06-25 RX ADMIN — Medication 0.8 MCG/KG/HR: at 01:47

## 2022-06-25 RX ADMIN — Medication 2 PUFF: at 19:15

## 2022-06-25 RX ADMIN — MONTELUKAST SODIUM 10 MG: 10 TABLET, COATED ORAL at 08:49

## 2022-06-25 RX ADMIN — PANTOPRAZOLE SODIUM 40 MG: 40 INJECTION, POWDER, LYOPHILIZED, FOR SOLUTION INTRAVENOUS at 22:06

## 2022-06-25 RX ADMIN — POTASSIUM PHOSPHATE, MONOBASIC AND POTASSIUM PHOSPHATE, DIBASIC 30 MMOL: 224; 236 INJECTION, SOLUTION, CONCENTRATE INTRAVENOUS at 09:06

## 2022-06-25 ASSESSMENT — PULMONARY FUNCTION TESTS
PIF_VALUE: 24
PIF_VALUE: 24
PIF_VALUE: 37
PIF_VALUE: 25
PIF_VALUE: 30
PIF_VALUE: 25
PIF_VALUE: 26
PIF_VALUE: 24
PIF_VALUE: 26
PIF_VALUE: 24
PIF_VALUE: 23
PIF_VALUE: 25
PIF_VALUE: 22
PIF_VALUE: 24
PIF_VALUE: 24
PIF_VALUE: 22
PIF_VALUE: 23

## 2022-06-25 ASSESSMENT — PAIN SCALES - GENERAL: PAINLEVEL_OUTOF10: 0

## 2022-06-25 NOTE — PROGRESS NOTES
RT Inhaler-Nebulizer Bronchodilator Protocol Note    There is a bronchodilator order in the chart from a provider indicating to follow the RT Bronchodilator Protocol and there is an Initiate RT Inhaler-Nebulizer Bronchodilator Protocol order as well (see protocol at bottom of note). CXR Findings:  XR CHEST PORTABLE    Result Date: 6/25/2022  1. No significant change. XR CHEST PORTABLE    Result Date: 6/24/2022  No significant interval change in basilar predominant airspace disease and small left greater than right pleural effusions. Pulmonary vascular congestion. The findings from the last RT Protocol Assessment were as follows:   History Pulmonary Disease: (P) Chronic pulmonary disease  Respiratory Pattern: (P) Mild dyspnea at rest, irregular pattern, or RR 21-25 bpm  Breath Sounds: (P) Slightly diminished and/or crackles  Cough: (P) Weak, productive  Indication for Bronchodilator Therapy: (P) Decreased or absent breath sounds  Bronchodilator Assessment Score: (P) 10    Aerosolized bronchodilator medication orders have been revised according to the RT Inhaler-Nebulizer Bronchodilator Protocol below. Respiratory Therapist to perform RT Therapy Protocol Assessment initially then follow the protocol. Repeat RT Therapy Protocol Assessment PRN for score 0-3 or on second treatment, BID, and PRN for scores above 3. No Indications - adjust the frequency to every 6 hours PRN wheezing or bronchospasm, if no treatments needed after 48 hours then discontinue using Per Protocol order mode. If indication present, adjust the RT bronchodilator orders based on the Bronchodilator Assessment Score as indicated below.   Use Inhaler orders unless patient has one or more of the following: on home nebulizer, not able to hold breath for 10 seconds, is not alert and oriented, cannot activate and use MDI correctly, or respiratory rate 25 breaths per minute or more, then use the equivalent nebulizer order(s) with same Frequency and PRN reasons based on the score. If a patient is on this medication at home then do not decrease Frequency below that used at home. 0-3 - enter or revise RT bronchodilator order(s) to equivalent RT Bronchodilator order with Frequency of every 4 hours PRN for wheezing or increased work of breathing using Per Protocol order mode. 4-6 - enter or revise RT Bronchodilator order(s) to two equivalent RT bronchodilator orders with one order with BID Frequency and one order with Frequency of every 4 hours PRN wheezing or increased work of breathing using Per Protocol order mode. 7-10 - enter or revise RT Bronchodilator order(s) to two equivalent RT bronchodilator orders with one order with TID Frequency and one order with Frequency of every 4 hours PRN wheezing or increased work of breathing using Per Protocol order mode. 11-13 - enter or revise RT Bronchodilator order(s) to one equivalent RT bronchodilator order with QID Frequency and an Albuterol order with Frequency of every 4 hours PRN wheezing or increased work of breathing using Per Protocol order mode. Greater than 13 - enter or revise RT Bronchodilator order(s) to one equivalent RT bronchodilator order with every 4 hours Frequency and an Albuterol order with Frequency of every 2 hours PRN wheezing or increased work of breathing using Per Protocol order mode.          Electronically signed by Leela Leon RCP on 6/25/2022 at 11:51 AM

## 2022-06-25 NOTE — PROGRESS NOTES
[facility] Progress Note           Subjective/   80y.o. year old female who we are seeing in consultation for FARHEEN/CKD. HPI:  Patient remains intubated, failed SBT this morning  Urine output 1 L noted in last 24 hours  Creatinine stable at 2.6  LE edema somewhat improved. ROS:  +weak, remains on ventilator    SH: Visitor present    Objective/   GEN:  Chronically ill, BP (!) 124/44   Pulse 78   Temp 99.8 °F (37.7 °C) (Bladder)   Resp 22   Ht 5' 6\" (1.676 m)   Wt 263 lb 3.7 oz (119.4 kg)   SpO2 97%   BMI 42.49 kg/m²   HEENT: non-icteric, no JVD  CV: S1, S2 without m/r/g; ++ LE edema  RESP: Decreased breath sounds  ABD: +bs, soft, nt, no hsm  SKIN: warm, no rashes    Data/  Recent Labs     06/23/22 0452 06/23/22  0452 06/23/22  1500 06/24/22  0430 06/25/22  0554   WBC 9.9  --   --  11.1* 9.8   HGB 6.6*   < > 9.2* 8.9* 9.0*   HCT 20.6*   < > 27.6* 27.6* 27.1*   MCV 85.9  --   --  85.0 86.4     --   --  231 245    < > = values in this interval not displayed. Recent Labs     06/23/22 0452 06/23/22  0452 06/23/22  1500 06/24/22  0430 06/25/22  0554   *  --   --  134* 137   K 3.2*   < > 3.5 3.6 3.1*     --   --  103 104   CO2 19*  --   --  19* 20*   GLUCOSE 161*  --   --  132* 166*   PHOS  --   --   --   --  1.7*   MG 1.80  --  1.90  --  1.70*   BUN 49*  --   --  50* 52*   CREATININE 2.6*  --   --  2.6* 2.6*   LABGLOM 18*  --   --  18* 18*   GFRAA 21*  --   --  21* 21*    < > = values in this interval not displayed.      Component      Latest Ref Rng & Units 6/22/2022 6/22/2022           4:10 PM  4:10 PM   Color, UA      Straw/Yellow Yellow    Clarity, UA      Clear Clear    Glucose, UA      Negative mg/dL Negative    Bilirubin, Urine      Negative Negative    Ketones, Urine      Negative mg/dL TRACE (A)    Specific Gravity, UA      1.005 - 1.030 1.025    Blood, Urine      Negative Negative    pH, UA      5.0 - 8.0 5.5    Protein, UA      Negative mg/dL TRACE (A)    Urobilinogen, Urine      <2.0 E.U./dL 0.2    Nitrite, Urine      Negative Negative    Leukocyte Esterase, Urine      Negative Negative    Microscopic Examination       YES    Urine Type       NotGiven    Mucus, UA      None Seen /LPF Rare (A)    WBC, UA      0 - 5 /HPF 10-20 (A)    RBC, UA      0 - 4 /HPF 3-4    Epithelial Cells, UA      0 - 5 /HPF 0-1    Bacteria, UA      None Seen /HPF 2+ (A)    Sodium, Ur      Not Established mmol/L  <20   Potassium, Ur      Not Established mmol/L  26.3   Chloride      Not Established mmol/L  <20       Assessment/     - Acute kidney injury - multi-factorial ATN injury in setting of aspiration PNA, hypotension, & elevated vancomycin levels     - Chronic kidney disease stage 3b - background DM/HTN/CHF              SCr 1.6              Follows with [facility]     - Acute hypoxemic respiratory failure/Aspiration PNA/Pulmonary edema - on ventilator     - Diastolic CHF     - Atrial fibrillation    - Anemia - prn prbc's     - Hypertension    - Hypokalemia - prn replacement    Plan/    -Replacement of potassium phosphate, magnesium and calcium as ordered  - Lasix drip on hold , as needed d diuresis likely tomorrow  - Monitoring off of ARB  - Trend labs, bp's, & urine output     ____________________________________  Nikole Chopra MD  [facility]  [email or web address]  Office: [phone]

## 2022-06-25 NOTE — PROGRESS NOTES
Shift assessment, completed, see flow sheet. Pt is alert and oriented x 1. Following commands. Intubated and sedated with a # 8 ETT, at 23 LL. On AC 20 / 380 /35 %/ 5. SR 77, /49, SpO2 98%. Respirations are easy, even, and unlabored. Bilateral lung sounds . OG in place at [address], with TF at 35 mL/hr, 0 residual.      PICC, WNL with Precedex  infusing. Zimmerman in place and patent with STAT lock. Bilateral soft wrist restraints in place for pt safety. Call light within reach. Bed in lowest position. Bed alarm on.

## 2022-06-25 NOTE — PROGRESS NOTES
rhonchi  Cardiovascular: normal rate, regular rhythm, normal S1 and S2, no murmurs, rubs, clicks or gallops, distal pulses intact, no carotid bruits, no murmurs, no gallops, no carotid bruits and no JVD   Abdomen: obese, soft, non-tender, non-distended, normal bowel sounds, no masses or organomegaly   Extremities: No edema or cyanosis  Musculoskeletal: normal range of motion, no joint swelling, deformity or tenderness   Neurologic: Awake    Labs:   Recent Labs     06/22/22  0555 06/22/22  0555 06/23/22  0452 06/23/22  1500 06/24/22  0430   WBC 10.3  --  9.9  --  11.1*   HGB 7.6*   < > 6.6* 9.2* 8.9*   HCT 23.4*   < > 20.6* 27.6* 27.6*     --  238  --  231    < > = values in this interval not displayed. Recent Labs     06/22/22  0555 06/22/22  0555 06/23/22  0452 06/23/22  1500 06/24/22  0430     --  134*  --  134*   K 3.8   < > 3.2* 3.5 3.6     --  103  --  103   CO2 19*  --  19*  --  19*   BUN 42*  --  49*  --  50*   CREATININE 2.2*  --  2.6*  --  2.6*   CALCIUM 6.7*  --  6.6*  --  6.9*    < > = values in this interval not displayed. Recent Labs     06/23/22 0452   AST 14*   ALT <5*   BILIDIR <0.2   BILITOT <0.2   ALKPHOS 66     No results for input(s): INR in the last 72 hours. No results for input(s): Prudence Gazella in the last 72 hours. Urinalysis:      Lab Results   Component Value Date    NITRU Negative 06/22/2022    WBCUA 10-20 06/22/2022    BACTERIA 2+ 06/22/2022    RBCUA 3-4 06/22/2022    BLOODU Negative 06/22/2022    SPECGRAV 1.025 06/22/2022    GLUCOSEU Negative 06/22/2022       Radiology:  XR CHEST PORTABLE   Final Result   No significant interval change in basilar predominant airspace disease and   small left greater than right pleural effusions. Pulmonary vascular   congestion. CT ABDOMEN PELVIS WO CONTRAST Additional Contrast? None   Final Result   Colonic diverticulosis without acute diverticulitis. Nonspecific rectal wall   thickening.   Partially visualized pleural effusions with adjacent compressive   atelectasis. XR CHEST PORTABLE   Final Result   Again identified is bibasilar airspace disease with small pleural effusions   and mild increased right perihilar infiltrates. This may be related to   atelectasis or pneumonia. Cardiomegaly. XR CHEST PORTABLE   Final Result   Stable examination with bilateral small effusions and bibasilar atelectasis. Stable ET tube and enteric tube. XR CHEST PORTABLE   Final Result   No acute process. Bibasilar hypoaeration      Stable cardiomegaly         IR PICC WO SQ PORT/PUMP > 5 YEARS   Final Result      XR CHEST PORTABLE   Final Result   Supportive tubing projects in stable position. Partial regression of atelectasis in the right lung base. Persisting   consolidation or or atelectasis in the left lung base. XR CHEST PORTABLE   Final Result   1. Endotracheal tube terminates in appropriate position above the nirali. 2.  Enteric tube terminates in the body of the stomach. 3.  Basilar opacities again demonstrated, right greater than left. Interval   improved aeration of the left lung base. XR CHEST PORTABLE   Final Result   Increasing opacity in the right base, lobar atelectasis versus accumulating   right pleural effusion. Combination of both entities or underlying pneumonia   also possible. Persisting left basilar airspace disease and small pleural effusion. XR CHEST PORTABLE   Final Result   Hazy bilateral lobe airspace opacities and small left pleural effusion. This   could represent atelectasis or pneumonia in the appropriate clinical setting. CT CHEST WO CONTRAST   Final Result   1. Respiratory motion in the lower lungs. The right base has linear and   dependent atelectasis. The left does have atelectatic changes but could also   have some patchy opacities from pneumonia or aspiration at the left base.       2.  There is no pleural effusion or pneumothorax. 3.  Narrowing of the AP diameter of the trachea with bulging of the   membranous portion due to the gas distended but nondilated esophagus. XR CHEST PORTABLE   Final Result   1. Bibasilar heterogeneous opacities may represent subsegmental atelectasis   and/or mild pulmonary edema. Underlying infection is difficult to exclude. Short-term follow-up PA and lateral chest radiographs may be helpful for   further evaluation. 2.  Cardiomegaly. XR CHEST PORTABLE    (Results Pending)   XR CHEST PORTABLE    (Results Pending)           Assessment/Plan:    Active Hospital Problems    Diagnosis     Mild protein-calorie malnutrition ([address] [E44.1]      Priority: Medium    Shortness of breath [R06.02]      Priority: Medium    Aspiration pneumonia of left lower lobe ([address] [J69.0]      Priority: Medium    Chronic kidney disease [N18.9]     Acute respiratory failure with hypoxia ([address] [J96.01]     Septicemia ([address] [A41.9]     Parkinson disease ([address] [G20]            Acute Hypox Resp Failure. Aspiration PNA. Oxygen requirements increased rapidly from 2l/min to NRB to vapotherm  Currently 100% 40 L on Vapotherm in addition to a nonrebreather. Might have to be intubated. Check Resp Culture. Add 3% saline neb. Continue IV Zosyn and vancomycin. Change Zosyn to cefepime. DC vancomycin 6/22  Intubated 6/18. SBT again today.        Sepsis POA - due to above.       Dysphagia. Abnormal CT  Cough with every meal intake per family report. Underlying Parkinson's. I ordered for MBS and esophagram, though these will unlikely be done till she is better clinically. Currently on tube feeds     Melena   GI bleed   drop in H/H   GI consulted   hold eliquis  IV PPI BID  Monitor h/h q 6       FARHEEN on CKD IIIB  Cr slightly  better then baseline actually on admission  Clinically does not appear fluid overloaded.    DC IV fluids  Lasix 40 mg IV x2 6/21-with poor response-->lasix gtt  Worsening creatinine. Consulted nephrology. Dc lasix gtt       Parkinson's on Sinemet.       Afib Paroxysmal.   Rythmol   Hold Eliquis given bloody secretions during bronch.      HTN - BP on the low side. Nifedipine stopped due to this and also leg edema. Consider alternative when restarting.          DVT Prophylaxis: eliquis held for bloody secretions seen during bronch and melena  Diet: Diet NPO  ADULT TUBE FEEDING; Orogastric; Peptide Based High Protein; Continuous; 20; Yes; 15; Q 4 hours; 35; 30;  Other (specify); normal saline, 30 ml every 6 hours, per Dr. Lara Rajan; Protein; one proteinex P2Go once daily  Code Status: Full Code    PT/OT Eval Status: not ordered    Shila Buenrostro MD

## 2022-06-25 NOTE — PROGRESS NOTES
06/23/22  0452 06/23/22  0452 06/23/22  1500 06/24/22  0430 06/25/22  0554   WBC 9.9  --   --  11.1* 9.8   HGB 6.6*   < > 9.2* 8.9* 9.0*   HCT 20.6*   < > 27.6* 27.6* 27.1*   MCV 85.9  --   --  85.0 86.4     --   --  231 245    < > = values in this interval not displayed. BMP:   Recent Labs     06/23/22  0452 06/23/22  0452 06/23/22  1500 06/24/22  0430 06/25/22  0554   *  --   --  134* 137   K 3.2*   < > 3.5 3.6 3.1*     --   --  103 104   CO2 19*  --   --  19* 20*   PHOS  --   --   --   --  1.7*   BUN 49*  --   --  50* 52*   CREATININE 2.6*  --   --  2.6* 2.6*    < > = values in this interval not displayed. LIVER PROFILE:   Recent Labs     06/23/22 0452   AST 14*   ALT <5*   BILIDIR <0.2   BILITOT <0.2   ALKPHOS 77       Microbiology:  6/17/2022 SARS-CoV-2 and influenza are negative  6/17/2022 MRSA DNA screen positive  6/17/2022 blood NG  6/17/2022 sputum NRF  6/18/2022 tracheal aspirate NRF  6/19/2022 BAL Candida  6/19/2022 respiratory viral panel negative    Imaging:  CXR 6/24/2022 small effusions ET tube okay    ACT 6/23/22   Small right and very small effusions, diverticulosis, nonspecific rectal wall thickening      ASSESSMENT:  · Acute hypoxemic respiratory failure  · Aspiration pneumonia  · BETSY on BiPAP 22/15 with 1 L bleed in   · Parkinson's disease  · Atrial fibrillation  · Acute on chronic kidney failure, baseline creatinine 1.6  · Acute on chronic anemia, s/p 2 U PRBC 6/23/2022  · H/O esophagitis  · Pleural effusions are small bilaterally    PLAN:  Mechanical ventilation as per my orders. The ventilator was adjusted by me at the bedside for unstable, life threatening respiratory failure. IV Precedex for sedation, target RASS -2, with daily spontaneous awakening trial.  Fentanyl gtt as needed.    Head of bed 30 degrees or higher at all times  · Daily spontaneous breathing trial  · Antibiotic day #9, now cefepime & vancomycin  · Inhaled bronchodilators  · IV iron per GI recommendation  · KPhos and calcium replaced this am, f/u Mg level    · Protonix twice daily, Eliquis is held, GI has seen  · Lasix drip is now off, nephrology following  · Prophylaxis: SCD, Bactroban, Protonix BID  · I'd like to meet with patient and spouse tomorrow to discuss plan. Total critical care time caring for this patient with life threatening, unstable organ failure, including direct patient contact, management of life support systems, review of data including imaging and labs, discussions with other team members and physicians is 32 minutes so far today, excluding procedures.

## 2022-06-25 NOTE — PROGRESS NOTES
Discussion with patient spouse regarding patient wishes and his thoughts on if patient would want to continue with intubation have trach placed. Spouse states that he and patient would want everything to be done. Encouraged spouse to think about and discuss with son and requested that spouse be available tomorrow to have discussion with Dr. Igor Cline .

## 2022-06-25 NOTE — PROGRESS NOTES
06/25/22 0312   NICU Vent Information   Vent Type 980   Vent Mode AC/VC   Vt (Set, mL) 380 mL   Vt Exhaled 355 mL   Resp Rate (Set) 20 bmp   Rate Measured 44 br/min   Minute Volume 4.97 Liters   Peak Flow 50 L/min   Pressure Support 0 cmH20   FiO2  35 %   Peak Inspiratory Pressure 30 cmH2O   I:E Ratio 1:2.70   Sensitivity 3   High Peep/I Pressure 0   PEEP/CPAP (cmH2O) 5   I Time/ I Time % 0 s   Mean Airway Pressure 16 cmH20   Cough/Sputum   Sputum How Obtained Suctioned;Endotracheal   Sputum Amount Moderate   Sputum Color Creamy   Tenacity Thick   Breath Sounds   Right Upper Lobe Diminished   Right Middle Lobe Diminished   Right Lower Lobe Diminished   Left Upper Lobe Diminished   Left Lower Lobe Diminished   Additional Respiratory Assessments   Heart Rate 79   Resp 21   SpO2 97 %   Position Semi-Mccollum's   Circuit Condensation Drained   Vent Alarm Settings   High Pressure  40 cmH2O   Low Minute Volume Alarm 4 L/min   RR High 40 br/min   ETT (adult)   Placement Date/Time: 06/18/22 1650   Present on Admission/Arrival: No  Placement Verified By: Direct visualization;Colorimetric ETCO2 device  Preoxygenation: Yes  Airway Tube Size: 8 mm  Laryngoscope: GlideScope  Blade Size: 3  Location: Oral  Secured. ..    ETT Placement Right

## 2022-06-25 NOTE — PROGRESS NOTES
06/25/22 1916   NICU Vent Information   Vent Type 980   Vent Mode AC/VC   Vt (Set, mL) 380 mL   Vt Exhaled 381 mL   Resp Rate (Set) 20 bmp   Rate Measured 20 br/min   Minute Volume 7.72 Liters   Peak Flow 50 L/min   Pressure Support 0 cmH20   FiO2  35 %   Peak Inspiratory Pressure 24 cmH2O   I:E Ratio 1:2.70   Sensitivity 3   High Peep/I Pressure 0   PEEP/CPAP (cmH2O) 5   I Time/ I Time % 0 s   Mean Airway Pressure 11 cmH20   Plateau Pressure 22 JTR21   Static Compliance 21 mL/cmH2O   Dynamic Compliance 18 mL/cmH2O   Cough/Sputum   Sputum How Obtained Endotracheal   Cough Productive   Sputum Amount Small   Sputum Color Creamy   Tenacity Thick   Breath Sounds   Right Upper Lobe Diminished   Right Middle Lobe Diminished   Right Lower Lobe Diminished   Left Upper Lobe Diminished   Left Lower Lobe Diminished   Additional Respiratory Assessments   Heart Rate 75   Resp 20   SpO2 95 %   Position Semi-Mccollum's   Humidification Source Heated wire   Humidification Temp 36.3   Circuit Condensation Drained   Vent Alarm Settings   High Pressure  40 cmH2O   Low Minute Volume Alarm 4 L/min   Ve Min 4   Low Exhaled Vt  250 mL   RR High 40 br/min   Apnea (Secs) 20 secs   Ambu Bag With Mask At Bedside Yes   Patient Observation   Observations 7.5 ett 24 at lip

## 2022-06-25 NOTE — PROGRESS NOTES
06/24/22 2325   NICU Vent Information   Vent Type 980   Vent Mode AC/VC   Vt (Set, mL) 380 mL   Vt Exhaled 384 mL   Resp Rate (Set) 20 bmp   Rate Measured 20 br/min   Minute Volume 7.41 Liters   Peak Flow 50 L/min   Pressure Support 0 cmH20   FiO2  35 %   Peak Inspiratory Pressure 25 cmH2O   I:E Ratio 1:2.70   Sensitivity 3   High Peep/I Pressure 0   PEEP/CPAP (cmH2O) 5   I Time/ I Time % 0 s   Mean Airway Pressure 11 cmH20   Cough/Sputum   Sputum How Obtained Endotracheal   Cough Productive   Sputum Amount Moderate   Sputum Color Creamy   Tenacity Thick   Breath Sounds   Right Upper Lobe Diminished   Right Middle Lobe Diminished   Right Lower Lobe Diminished   Left Upper Lobe Diminished   Left Lower Lobe Diminished   Additional Respiratory Assessments   Heart Rate 70   Resp 20   SpO2 95 %   Position Semi-Mccollum's   Humidification Source Heated wire   Humidification Temp 36.8   Circuit Condensation Drained   Vent Alarm Settings   High Pressure  40 cmH2O   Low Minute Volume Alarm 4 L/min   Ve Min 4   Low Exhaled Vt  250 mL   RR High 40 br/min   Patient Observation   Observations 7.5 ett 24 at lip epigastric/complains of pain/discomfort

## 2022-06-25 NOTE — PROGRESS NOTES
Hospitalist Progress Note      PCP: Loren Ahumada, MD    Date of Admission: 6/17/2022    Subjective: admitted for resp failure from CHF , pna, eventually intubated    Ms. Telma Nava remains on vent support, awake on precedex, comfortable     lasix gtt stopped 2/2 bump in creat, on SBT trial this am for 2 hrs  Medications:  Reviewed    Infusion Medications    dexmedetomidine HCl in NaCl 0.8 mcg/kg/hr (06/25/22 0616)    sodium chloride      sodium chloride       Scheduled Medications    iron sucrose (VENOFER) iv piggyback 100 mL (Admin over 60 minutes)  100 mg IntraVENous Q24H    calcium gluconate  1,000 mg IntraVENous Once    potassium phosphate IVPB  30 mmol IntraVENous Once    pantoprazole  40 mg IntraVENous BID    cefepime  1,000 mg IntraVENous Q12H    ipratropium-albuterol  1 ampule Inhalation Q4H    lidocaine 1 % injection  5 mL IntraDERmal Once    mupirocin   Nasal BID    chlorhexidine  15 mL Mouth/Throat BID    sodium chloride flush  10 mL IntraVENous 2 times per day    [Held by provider] apixaban  2.5 mg Oral BID    atorvastatin  40 mg Oral Nightly    carbidopa-levodopa  1 tablet Oral 4x Daily    DULoxetine  60 mg Oral Daily    budesonide-formoterol  2 puff Inhalation BID    lamoTRIgine  50 mg Oral BID    montelukast  10 mg Oral Daily    propafenone  150 mg Oral 3 times per day     PRN Meds: carboxymethylcellulose PF, sodium chloride (Inhalant), albuterol, sodium chloride, midazolam, fentanNYL, sodium chloride flush, sodium chloride, promethazine **OR** ondansetron, acetaminophen **OR** acetaminophen, albuterol      Intake/Output Summary (Last 24 hours) at 6/25/2022 0759  Last data filed at 6/25/2022 0402  Gross per 24 hour   Intake 1254.78 ml   Output 1070 ml   Net 184.78 ml       Physical Exam Performed:    BP (!) 116/47   Pulse 73   Temp 99.8 °F (37.7 °C) (Bladder)   Resp 20   Ht 5' 6\" (1.676 m)   Wt 263 lb 3.7 oz (119.4 kg)   SpO2 97%   BMI 42.49 kg/m²        General Appearance:elderly female on vent support,  Intubated, awake   Oral ETT and OG noted  Appears to be not in any distress  Mucous Membranes:  Pink , anicteric  Neck: No JVD, no carotid bruit, no thyromegaly  Chest:  Clear to auscultation bilaterally, diminished in bases with scattered crackles  Cardiovascular:  RRR S1S2 heard, no murmurs or gallops  Abdomen:  Soft, obese,  undistended, non tender, no organomegaly, BS present  Extremities: diffuse wesly 2 + edema to both LE . Distal pulses well felt  Neurological :drowsy and arousable. Not following commands         Labs:   Recent Labs     06/23/22 0452 06/23/22  0452 06/23/22  1500 06/24/22  0430 06/25/22  0554   WBC 9.9  --   --  11.1* 9.8   HGB 6.6*   < > 9.2* 8.9* 9.0*   HCT 20.6*   < > 27.6* 27.6* 27.1*     --   --  231 245    < > = values in this interval not displayed. Recent Labs     06/23/22 0452 06/23/22 0452 06/23/22  1500 06/24/22  0430 06/25/22  0554   *  --   --  134* 137   K 3.2*   < > 3.5 3.6 3.1*     --   --  103 104   CO2 19*  --   --  19* 20*   BUN 49*  --   --  50* 52*   CREATININE 2.6*  --   --  2.6* 2.6*   CALCIUM 6.6*  --   --  6.9* 6.8*   PHOS  --   --   --   --  1.7*    < > = values in this interval not displayed. Recent Labs     06/23/22 0452   AST 14*   ALT <5*   BILIDIR <0.2   BILITOT <0.2   ALKPHOS 66     No results for input(s): INR in the last 72 hours. No results for input(s): Nathan Shackle in the last 72 hours. Urinalysis:      Lab Results   Component Value Date    NITRU Negative 06/22/2022    WBCUA 10-20 06/22/2022    BACTERIA 2+ 06/22/2022    RBCUA 3-4 06/22/2022    BLOODU Negative 06/22/2022    SPECGRAV 1.025 06/22/2022    GLUCOSEU Negative 06/22/2022       Radiology:  XR CHEST PORTABLE   Final Result   1. No significant change. XR CHEST PORTABLE   Final Result   No significant interval change in basilar predominant airspace disease and   small left greater than right pleural effusions. Pulmonary vascular   congestion. CT ABDOMEN PELVIS WO CONTRAST Additional Contrast? None   Final Result   Colonic diverticulosis without acute diverticulitis. Nonspecific rectal wall   thickening. Partially visualized pleural effusions with adjacent compressive   atelectasis. XR CHEST PORTABLE   Final Result   Again identified is bibasilar airspace disease with small pleural effusions   and mild increased right perihilar infiltrates. This may be related to   atelectasis or pneumonia. Cardiomegaly. XR CHEST PORTABLE   Final Result   Stable examination with bilateral small effusions and bibasilar atelectasis. Stable ET tube and enteric tube. XR CHEST PORTABLE   Final Result   No acute process. Bibasilar hypoaeration      Stable cardiomegaly         IR PICC WO SQ PORT/PUMP > 5 YEARS   Final Result      XR CHEST PORTABLE   Final Result   Supportive tubing projects in stable position. Partial regression of atelectasis in the right lung base. Persisting   consolidation or or atelectasis in the left lung base. XR CHEST PORTABLE   Final Result   1. Endotracheal tube terminates in appropriate position above the nirali. 2.  Enteric tube terminates in the body of the stomach. 3.  Basilar opacities again demonstrated, right greater than left. Interval   improved aeration of the left lung base. XR CHEST PORTABLE   Final Result   Increasing opacity in the right base, lobar atelectasis versus accumulating   right pleural effusion. Combination of both entities or underlying pneumonia   also possible. Persisting left basilar airspace disease and small pleural effusion. XR CHEST PORTABLE   Final Result   Hazy bilateral lobe airspace opacities and small left pleural effusion. This   could represent atelectasis or pneumonia in the appropriate clinical setting. CT CHEST WO CONTRAST   Final Result   1.   Respiratory motion in the lower lungs. The right base has linear and   dependent atelectasis. The left does have atelectatic changes but could also   have some patchy opacities from pneumonia or aspiration at the left base. 2.  There is no pleural effusion or pneumothorax. 3.  Narrowing of the AP diameter of the trachea with bulging of the   membranous portion due to the gas distended but nondilated esophagus. XR CHEST PORTABLE   Final Result   1. Bibasilar heterogeneous opacities may represent subsegmental atelectasis   and/or mild pulmonary edema. Underlying infection is difficult to exclude. Short-term follow-up PA and lateral chest radiographs may be helpful for   further evaluation. 2.  Cardiomegaly. XR CHEST PORTABLE    (Results Pending)           Assessment/Plan:    Active Hospital Problems    Diagnosis     Mild protein-calorie malnutrition ([address] [E44.1]      Priority: Medium    Shortness of breath [R06.02]      Priority: Medium    Aspiration pneumonia of left lower lobe ([address] [J69.0]      Priority: Medium    Chronic kidney disease [N18.9]     Acute respiratory failure with hypoxia ([address] [J96.01]     Septicemia ([address] [A41.9]     Parkinson disease ([address] [G20]            Acute Hypox Resp Failure. Aspiration PNA. Oxygen requirements increased rapidly from 2l/min to NRB to vapotherm  Currently 100% 40 L on Vapotherm in addition to a nonrebreather. Might have to be intubated. Check Resp Culture. Add 3% saline neb. Continue IV Zosyn and vancomycin. Change Zosyn to cefepime. DC vancomycin 6/22  Intubated 6/18. SBT again today.       Sepsis POA - due to above.       Dysphagia. Abnormal CT  Cough with every meal intake per family report. Underlying Parkinson's. I ordered for MBS and esophagram, though these will unlikely be done till she is better clinically.    Currently on tube feeds     Melena   GI bleed   drop in H/H   GI consulted   hold eliquis  IV PPI BID  Monitor h/h q 6 PROVIDER:[TOKEN:[[id number]]] PROVIDER:[TOKEN:[[id number]],FOLLOWUP:[2 weeks]]

## 2022-06-25 NOTE — PLAN OF CARE
Problem: Discharge Planning  Goal: Discharge to home or other facility with appropriate resources  Outcome: Progressing  Flowsheets  Taken 6/24/2022 2000 by Amado Bai RN  Discharge to home or other facility with appropriate resources: Identify barriers to discharge with patient and caregiver  Taken 6/24/2022 0800 by Emilio Favre, RN  Discharge to home or other facility with appropriate resources: Identify barriers to discharge with patient and caregiver     Problem: Pain  Goal: Verbalizes/displays adequate comfort level or baseline comfort level  Outcome: Progressing  Flowsheets (Taken 6/24/2022 2000)  Verbalizes/displays adequate comfort level or baseline comfort level:   Encourage patient to monitor pain and request assistance   Assess pain using appropriate pain scale   Administer analgesics based on type and severity of pain and evaluate response   Implement non-pharmacological measures as appropriate and evaluate response     Problem: ABCDS Injury Assessment  Goal: Absence of physical injury  Outcome: Progressing     Problem: Skin/Tissue Integrity  Goal: Absence of new skin breakdown  Description: 1. Monitor for areas of redness and/or skin breakdown  2. Assess vascular access sites hourly  3. Every 4-6 hours minimum:  Change oxygen saturation probe site  4. Every 4-6 hours:  If on nasal continuous positive airway pressure, respiratory therapy assess nares and determine need for appliance change or resting period.   Outcome: Progressing     Problem: Chronic Conditions and Co-morbidities  Goal: Patient's chronic conditions and co-morbidity symptoms are monitored and maintained or improved  Outcome: Progressing  Flowsheets  Taken 6/24/2022 2000 by Valentin Wylie 34 - Patient's Chronic Conditions and Co-Morbidity Symptoms are Monitored and Maintained or Improved: Monitor and assess patient's chronic conditions and comorbid symptoms for stability, deterioration, or improvement  Taken 6/24/2022 0800 by Gillian Weinstein RN  Care Plan - Patient's Chronic Conditions and Co-Morbidity Symptoms are Monitored and Maintained or Improved: Monitor and assess patient's chronic conditions and comorbid symptoms for stability, deterioration, or improvement     Problem: Safety - Adult  Goal: Free from fall injury  Outcome: Progressing     Problem: Safety - Medical Restraint  Goal: Remains free of injury from restraints (Restraint for Interference with Medical Device)  Description: INTERVENTIONS:  1. Determine that other, less restrictive measures have been tried or would not be effective before applying the restraint  2. Evaluate the patient's condition at the time of restraint application  3. Inform patient/family regarding the reason for restraint  4.  Q2H: Monitor safety, psychosocial status, comfort, nutrition and hydration  Outcome: Progressing  Flowsheets  Taken 6/24/2022 2000 by Marcos Gan RN  Remains free of injury from restraints (restraint for interference with medical device):   Determine that other, less restrictive measures have been tried or would not be effective before applying the restraint   Evaluate the patient's condition at the time of restraint application   Inform patient/family regarding the reason for restraint   Every 2 hours: Monitor safety, psychosocial status, comfort, nutrition and hydration  Taken 6/24/2022 1800 by Gillian Weinstein RN  Remains free of injury from restraints (restraint for interference with medical device): Determine that other, less restrictive measures have been tried or would not be effective before applying the restraint  Taken 6/24/2022 1600 by Gillian Weinstein RN  Remains free of injury from restraints (restraint for interference with medical device): Determine that other, less restrictive measures have been tried or would not be effective before applying the restraint  Taken 6/24/2022 1400 by Gillian Weinstein RN  Remains free of injury from restraints (restraint for interference with medical device): Determine that other, less restrictive measures have been tried or would not be effective before applying the restraint     Problem: Nutrition Deficit:  Goal: Optimize nutritional status  Outcome: Progressing  Flowsheets (Taken 6/24/2022 1100 by Micheline Mejia, RD, LD)  Nutrient intake appropriate for improving, restoring, or maintaining nutritional needs:   Assess nutritional status and recommend course of action   Recommend, monitor, and adjust tube feedings and TPN/PPN based on assessed needs   Monitor oral intake, labs, and treatment plans     Pt resting in bed tolerating vent settings, pt awake and aware, follows nurse directions, IV's infusing, TF infusing, archuleta and FMS patent, no s/s of distress.

## 2022-06-25 NOTE — PROGRESS NOTES
able to hold breath for 10 seconds, is not alert and oriented, cannot activate and use MDI correctly, or respiratory rate 25 breaths per minute or more, then use the equivalent nebulizer order(s) with same Frequency and PRN reasons based on the score. If a patient is on this medication at home then do not decrease Frequency below that used at home. 0-3 - enter or revise RT bronchodilator order(s) to equivalent RT Bronchodilator order with Frequency of every 4 hours PRN for wheezing or increased work of breathing using Per Protocol order mode. 4-6 - enter or revise RT Bronchodilator order(s) to two equivalent RT bronchodilator orders with one order with BID Frequency and one order with Frequency of every 4 hours PRN wheezing or increased work of breathing using Per Protocol order mode. 7-10 - enter or revise RT Bronchodilator order(s) to two equivalent RT bronchodilator orders with one order with TID Frequency and one order with Frequency of every 4 hours PRN wheezing or increased work of breathing using Per Protocol order mode. 11-13 - enter or revise RT Bronchodilator order(s) to one equivalent RT bronchodilator order with QID Frequency and an Albuterol order with Frequency of every 4 hours PRN wheezing or increased work of breathing using Per Protocol order mode. Greater than 13 - enter or revise RT Bronchodilator order(s) to one equivalent RT bronchodilator order with every 4 hours Frequency and an Albuterol order with Frequency of every 2 hours PRN wheezing or increased work of breathing using Per Protocol order mode.      Electronically signed by Mickey Nichols RCP on 6/25/2022 at 7:23 PM

## 2022-06-25 NOTE — PROGRESS NOTES
Pt is awake and following all commands. SBT of 8/5 attempted. pts Vt in mid 200s, RR in 30s and F/V 120s. PS increased to 10. Pt tolerating better. RR are 31, VT mid to upper 270s, F/V is 108. sp02 is 98%. Continuing to monitor pt.

## 2022-06-26 ENCOUNTER — APPOINTMENT (OUTPATIENT)
Dept: GENERAL RADIOLOGY | Age: 82
DRG: 004 | End: 2022-06-26
Payer: MEDICARE

## 2022-06-26 LAB
ANION GAP SERPL CALCULATED.3IONS-SCNC: 12 MMOL/L (ref 3–16)
BASE EXCESS ARTERIAL: -7.6 MMOL/L (ref -3–3)
BASOPHILS ABSOLUTE: 0 K/UL (ref 0–0.2)
BASOPHILS RELATIVE PERCENT: 0.3 %
BUN BLDV-MCNC: 54 MG/DL (ref 7–20)
CALCIUM SERPL-MCNC: 7 MG/DL (ref 8.3–10.6)
CARBOXYHEMOGLOBIN ARTERIAL: 0.3 % (ref 0–1.5)
CHLORIDE BLD-SCNC: 106 MMOL/L (ref 99–110)
CO2: 19 MMOL/L (ref 21–32)
CREAT SERPL-MCNC: 2.5 MG/DL (ref 0.6–1.2)
EOSINOPHILS ABSOLUTE: 0.3 K/UL (ref 0–0.6)
EOSINOPHILS RELATIVE PERCENT: 3.1 %
GFR AFRICAN AMERICAN: 22
GFR NON-AFRICAN AMERICAN: 19
GLUCOSE BLD-MCNC: 145 MG/DL (ref 70–99)
GLUCOSE BLD-MCNC: 148 MG/DL (ref 70–99)
GLUCOSE BLD-MCNC: 148 MG/DL (ref 70–99)
GLUCOSE BLD-MCNC: 151 MG/DL (ref 70–99)
GLUCOSE BLD-MCNC: 157 MG/DL (ref 70–99)
GLUCOSE BLD-MCNC: 158 MG/DL (ref 70–99)
HCO3 ARTERIAL: 17.4 MMOL/L (ref 21–29)
HCT VFR BLD CALC: 26.9 % (ref 36–48)
HEMOGLOBIN, ART, EXTENDED: 9.7 G/DL (ref 12–16)
HEMOGLOBIN: 8.8 G/DL (ref 12–16)
LYMPHOCYTES ABSOLUTE: 0.8 K/UL (ref 1–5.1)
LYMPHOCYTES RELATIVE PERCENT: 7.7 %
MAGNESIUM: 1.9 MG/DL (ref 1.8–2.4)
MCH RBC QN AUTO: 28 PG (ref 26–34)
MCHC RBC AUTO-ENTMCNC: 32.6 G/DL (ref 31–36)
MCV RBC AUTO: 85.8 FL (ref 80–100)
METHEMOGLOBIN ARTERIAL: 0.3 %
MONOCYTES ABSOLUTE: 1 K/UL (ref 0–1.3)
MONOCYTES RELATIVE PERCENT: 9.1 %
NEUTROPHILS ABSOLUTE: 8.5 K/UL (ref 1.7–7.7)
NEUTROPHILS RELATIVE PERCENT: 79.8 %
O2 SAT, ARTERIAL: 96.6 %
O2 THERAPY: ABNORMAL
PCO2 ARTERIAL: 33.3 MMHG (ref 35–45)
PDW BLD-RTO: 14 % (ref 12.4–15.4)
PERFORMED ON: ABNORMAL
PH ARTERIAL: 7.34 (ref 7.35–7.45)
PHOSPHORUS: 3.4 MG/DL (ref 2.5–4.9)
PLATELET # BLD: 279 K/UL (ref 135–450)
PMV BLD AUTO: 8.3 FL (ref 5–10.5)
PO2 ARTERIAL: 90.8 MMHG (ref 75–108)
POTASSIUM SERPL-SCNC: 3.3 MMOL/L (ref 3.5–5.1)
RBC # BLD: 3.14 M/UL (ref 4–5.2)
SODIUM BLD-SCNC: 137 MMOL/L (ref 136–145)
TCO2 ARTERIAL: 18.4 MMOL/L
WBC # BLD: 10.6 K/UL (ref 4–11)

## 2022-06-26 PROCEDURE — 94640 AIRWAY INHALATION TREATMENT: CPT

## 2022-06-26 PROCEDURE — 84100 ASSAY OF PHOSPHORUS: CPT

## 2022-06-26 PROCEDURE — C9113 INJ PANTOPRAZOLE SODIUM, VIA: HCPCS | Performed by: INTERNAL MEDICINE

## 2022-06-26 PROCEDURE — 83735 ASSAY OF MAGNESIUM: CPT

## 2022-06-26 PROCEDURE — 82803 BLOOD GASES ANY COMBINATION: CPT

## 2022-06-26 PROCEDURE — 85025 COMPLETE CBC W/AUTO DIFF WBC: CPT

## 2022-06-26 PROCEDURE — 6370000000 HC RX 637 (ALT 250 FOR IP): Performed by: INTERNAL MEDICINE

## 2022-06-26 PROCEDURE — 2580000003 HC RX 258: Performed by: INTERNAL MEDICINE

## 2022-06-26 PROCEDURE — 6360000002 HC RX W HCPCS: Performed by: INTERNAL MEDICINE

## 2022-06-26 PROCEDURE — 94003 VENT MGMT INPAT SUBQ DAY: CPT

## 2022-06-26 PROCEDURE — 71045 X-RAY EXAM CHEST 1 VIEW: CPT

## 2022-06-26 PROCEDURE — 80048 BASIC METABOLIC PNL TOTAL CA: CPT

## 2022-06-26 PROCEDURE — 94761 N-INVAS EAR/PLS OXIMETRY MLT: CPT

## 2022-06-26 PROCEDURE — 99291 CRITICAL CARE FIRST HOUR: CPT | Performed by: INTERNAL MEDICINE

## 2022-06-26 PROCEDURE — 2700000000 HC OXYGEN THERAPY PER DAY

## 2022-06-26 PROCEDURE — 2000000000 HC ICU R&B

## 2022-06-26 PROCEDURE — 99233 SBSQ HOSP IP/OBS HIGH 50: CPT | Performed by: INTERNAL MEDICINE

## 2022-06-26 PROCEDURE — 2500000003 HC RX 250 WO HCPCS: Performed by: INTERNAL MEDICINE

## 2022-06-26 RX ORDER — MAGNESIUM SULFATE 1 G/100ML
1000 INJECTION INTRAVENOUS ONCE
Status: COMPLETED | OUTPATIENT
Start: 2022-06-26 | End: 2022-06-26

## 2022-06-26 RX ORDER — POTASSIUM CHLORIDE 29.8 MG/ML
20 INJECTION INTRAVENOUS ONCE
Status: COMPLETED | OUTPATIENT
Start: 2022-06-26 | End: 2022-06-26

## 2022-06-26 RX ORDER — FUROSEMIDE 10 MG/ML
40 INJECTION INTRAMUSCULAR; INTRAVENOUS ONCE
Status: COMPLETED | OUTPATIENT
Start: 2022-06-26 | End: 2022-06-26

## 2022-06-26 RX ORDER — CALCIUM GLUCONATE 20 MG/ML
2000 INJECTION, SOLUTION INTRAVENOUS ONCE
Status: COMPLETED | OUTPATIENT
Start: 2022-06-26 | End: 2022-06-26

## 2022-06-26 RX ORDER — POTASSIUM CHLORIDE 29.8 MG/ML
20 INJECTION INTRAVENOUS
Status: DISPENSED | OUTPATIENT
Start: 2022-06-26 | End: 2022-06-26

## 2022-06-26 RX ADMIN — Medication 2 PUFF: at 19:29

## 2022-06-26 RX ADMIN — ATORVASTATIN CALCIUM 40 MG: 40 TABLET, FILM COATED ORAL at 21:07

## 2022-06-26 RX ADMIN — CARBIDOPA AND LEVODOPA 1 TABLET: 25; 100 TABLET ORAL at 16:50

## 2022-06-26 RX ADMIN — MONTELUKAST SODIUM 10 MG: 10 TABLET, COATED ORAL at 08:59

## 2022-06-26 RX ADMIN — Medication 0.8 MCG/KG/HR: at 06:36

## 2022-06-26 RX ADMIN — IPRATROPIUM BROMIDE AND ALBUTEROL SULFATE 1 AMPULE: 2.5; .5 SOLUTION RESPIRATORY (INHALATION) at 07:32

## 2022-06-26 RX ADMIN — SODIUM CHLORIDE, PRESERVATIVE FREE 10 ML: 5 INJECTION INTRAVENOUS at 20:52

## 2022-06-26 RX ADMIN — SODIUM CHLORIDE, PRESERVATIVE FREE 10 ML: 5 INJECTION INTRAVENOUS at 09:01

## 2022-06-26 RX ADMIN — CEFEPIME HYDROCHLORIDE 1000 MG: 1 INJECTION, POWDER, FOR SOLUTION INTRAMUSCULAR; INTRAVENOUS at 08:59

## 2022-06-26 RX ADMIN — POTASSIUM CHLORIDE 20 MEQ: 29.8 INJECTION INTRAVENOUS at 12:24

## 2022-06-26 RX ADMIN — IRON SUCROSE 100 MG: 20 INJECTION, SOLUTION INTRAVENOUS at 09:05

## 2022-06-26 RX ADMIN — PANTOPRAZOLE SODIUM 40 MG: 40 INJECTION, POWDER, LYOPHILIZED, FOR SOLUTION INTRAVENOUS at 08:59

## 2022-06-26 RX ADMIN — CARBIDOPA AND LEVODOPA 1 TABLET: 25; 100 TABLET ORAL at 21:07

## 2022-06-26 RX ADMIN — Medication 0.8 MCG/KG/HR: at 16:51

## 2022-06-26 RX ADMIN — Medication 15 ML: at 08:59

## 2022-06-26 RX ADMIN — PROPAFENONE HYDROCHLORIDE 150 MG: 150 TABLET, FILM COATED ORAL at 05:14

## 2022-06-26 RX ADMIN — IPRATROPIUM BROMIDE AND ALBUTEROL SULFATE 1 AMPULE: 2.5; .5 SOLUTION RESPIRATORY (INHALATION) at 19:29

## 2022-06-26 RX ADMIN — CARBIDOPA AND LEVODOPA 1 TABLET: 25; 100 TABLET ORAL at 12:36

## 2022-06-26 RX ADMIN — LAMOTRIGINE 50 MG: 25 TABLET ORAL at 21:07

## 2022-06-26 RX ADMIN — DULOXETINE HYDROCHLORIDE 60 MG: 60 CAPSULE, DELAYED RELEASE ORAL at 08:59

## 2022-06-26 RX ADMIN — CARBIDOPA AND LEVODOPA 1 TABLET: 25; 100 TABLET ORAL at 08:59

## 2022-06-26 RX ADMIN — POTASSIUM CHLORIDE 20 MEQ: 29.8 INJECTION INTRAVENOUS at 10:14

## 2022-06-26 RX ADMIN — IPRATROPIUM BROMIDE AND ALBUTEROL SULFATE 1 AMPULE: 2.5; .5 SOLUTION RESPIRATORY (INHALATION) at 15:31

## 2022-06-26 RX ADMIN — LAMOTRIGINE 50 MG: 25 TABLET ORAL at 09:00

## 2022-06-26 RX ADMIN — PANTOPRAZOLE SODIUM 40 MG: 40 INJECTION, POWDER, LYOPHILIZED, FOR SOLUTION INTRAVENOUS at 21:01

## 2022-06-26 RX ADMIN — MUPIROCIN: 20 OINTMENT TOPICAL at 09:01

## 2022-06-26 RX ADMIN — Medication 2 PUFF: at 07:32

## 2022-06-26 RX ADMIN — Medication 0.8 MCG/KG/HR: at 12:26

## 2022-06-26 RX ADMIN — MUPIROCIN: 20 OINTMENT TOPICAL at 20:50

## 2022-06-26 RX ADMIN — PROPAFENONE HYDROCHLORIDE 150 MG: 150 TABLET, FILM COATED ORAL at 13:45

## 2022-06-26 RX ADMIN — CALCIUM GLUCONATE 2000 MG: 20 INJECTION, SOLUTION INTRAVENOUS at 11:05

## 2022-06-26 RX ADMIN — Medication 0.8 MCG/KG/HR: at 21:28

## 2022-06-26 RX ADMIN — IPRATROPIUM BROMIDE AND ALBUTEROL SULFATE 1 AMPULE: 2.5; .5 SOLUTION RESPIRATORY (INHALATION) at 23:06

## 2022-06-26 RX ADMIN — FUROSEMIDE 40 MG: 10 INJECTION, SOLUTION INTRAMUSCULAR; INTRAVENOUS at 10:15

## 2022-06-26 RX ADMIN — PROPAFENONE HYDROCHLORIDE 150 MG: 150 TABLET, FILM COATED ORAL at 21:07

## 2022-06-26 RX ADMIN — MAGNESIUM SULFATE HEPTAHYDRATE 1000 MG: 1 INJECTION, SOLUTION INTRAVENOUS at 10:15

## 2022-06-26 RX ADMIN — POTASSIUM BICARBONATE 20 MEQ: 391 TABLET, EFFERVESCENT ORAL at 08:59

## 2022-06-26 RX ADMIN — IPRATROPIUM BROMIDE AND ALBUTEROL SULFATE 1 AMPULE: 2.5; .5 SOLUTION RESPIRATORY (INHALATION) at 11:35

## 2022-06-26 RX ADMIN — IPRATROPIUM BROMIDE AND ALBUTEROL SULFATE 1 AMPULE: 2.5; .5 SOLUTION RESPIRATORY (INHALATION) at 03:07

## 2022-06-26 RX ADMIN — Medication 15 ML: at 20:50

## 2022-06-26 RX ADMIN — CEFEPIME HYDROCHLORIDE 1000 MG: 1 INJECTION, POWDER, FOR SOLUTION INTRAMUSCULAR; INTRAVENOUS at 21:05

## 2022-06-26 RX ADMIN — Medication 0.8 MCG/KG/HR: at 02:21

## 2022-06-26 ASSESSMENT — PULMONARY FUNCTION TESTS
PIF_VALUE: 23
PIF_VALUE: 24
PIF_VALUE: 24
PIF_VALUE: 23
PIF_VALUE: 24
PIF_VALUE: 23
PIF_VALUE: 24
PIF_VALUE: 24
PIF_VALUE: 25
PIF_VALUE: 25
PIF_VALUE: 22
PIF_VALUE: 24
PIF_VALUE: 24
PIF_VALUE: 28
PIF_VALUE: 26
PIF_VALUE: 24
PIF_VALUE: 29

## 2022-06-26 ASSESSMENT — PAIN SCALES - GENERAL
PAINLEVEL_OUTOF10: 0
PAINLEVEL_OUTOF10: 0

## 2022-06-26 NOTE — PLAN OF CARE
Problem: Discharge Planning  Goal: Discharge to home or other facility with appropriate resources  6/26/2022 0257 by Arsen Hylton RN  Outcome: Progressing  6/25/2022 1857 by Digna Downs RN  Outcome: Progressing     Problem: Pain  Goal: Verbalizes/displays adequate comfort level or baseline comfort level  6/26/2022 0257 by Arsen Hylton RN  Outcome: Progressing  6/25/2022 1857 by Digna Downs RN  Outcome: Progressing     Problem: ABCDS Injury Assessment  Goal: Absence of physical injury  6/26/2022 0257 by Arsen Hylton RN  Outcome: Progressing  6/25/2022 1857 by Digna Downs RN  Outcome: Progressing     Problem: Skin/Tissue Integrity  Goal: Absence of new skin breakdown  Description: 1. Monitor for areas of redness and/or skin breakdown  2. Assess vascular access sites hourly  3. Every 4-6 hours minimum:  Change oxygen saturation probe site  4. Every 4-6 hours:  If on nasal continuous positive airway pressure, respiratory therapy assess nares and determine need for appliance change or resting period. Outcome: Progressing     Problem: Chronic Conditions and Co-morbidities  Goal: Patient's chronic conditions and co-morbidity symptoms are monitored and maintained or improved  Outcome: Progressing     Problem: Safety - Adult  Goal: Free from fall injury  Outcome: Progressing     Problem: Safety - Medical Restraint  Goal: Remains free of injury from restraints (Restraint for Interference with Medical Device)  Description: INTERVENTIONS:  1. Determine that other, less restrictive measures have been tried or would not be effective before applying the restraint  2. Evaluate the patient's condition at the time of restraint application  3. Inform patient/family regarding the reason for restraint  4.  Q2H: Monitor safety, psychosocial status, comfort, nutrition and hydration  Outcome: Progressing  Flowsheets  Taken 6/25/2022 2200 by Arsen Hylton RN  Remains free of injury from restraints (restraint for interference with medical device): Determine that other, less restrictive measures have been tried or would not be effective before applying the restraint  Taken 6/25/2022 2000 by La Warner RN  Remains free of injury from restraints (restraint for interference with medical device): Determine that other, less restrictive measures have been tried or would not be effective before applying the restraint  Taken 6/25/2022 1600 by Kandace Staples RN  Remains free of injury from restraints (restraint for interference with medical device):   Determine that other, less restrictive measures have been tried or would not be effective before applying the restraint   Evaluate the patient's condition at the time of restraint application   Inform patient/family regarding the reason for restraint   Every 2 hours: Monitor safety, psychosocial status, comfort, nutrition and hydration  Taken 6/25/2022 1400 by Kandace Staples RN  Remains free of injury from restraints (restraint for interference with medical device):   Determine that other, less restrictive measures have been tried or would not be effective before applying the restraint   Evaluate the patient's condition at the time of restraint application   Every 2 hours: Monitor safety, psychosocial status, comfort, nutrition and hydration     Problem: Nutrition Deficit:  Goal: Optimize nutritional status  Outcome: Progressing

## 2022-06-26 NOTE — PROGRESS NOTES
06/26/22 1930   NICU Vent Information   Vent Type 980   Vent Mode AC/VC   Vt (Set, mL) 380 mL   Vt Exhaled 380 mL   Resp Rate (Set) 20 bmp   Rate Measured 20 br/min   Minute Volume 7.86 Liters   Peak Flow 50 L/min   Pressure Support 0 cmH20   FiO2  35 %   Peak Inspiratory Pressure 24 cmH2O   I:E Ratio 1:2.70   Sensitivity 3   High Peep/I Pressure 0   PEEP/CPAP (cmH2O) 5   I Time/ I Time % 0 s   Mean Airway Pressure 11 cmH20   Plateau Pressure 22 [id number]   Static Compliance 22 mL/cmH2O   Dynamic Compliance 19 mL/cmH2O   Cough/Sputum   Sputum How Obtained Endotracheal   Cough Productive   Sputum Amount Moderate   Sputum Color Creamy   Tenacity Thick   Breath Sounds   Right Upper Lobe Diminished   Right Middle Lobe Diminished   Right Lower Lobe Diminished   Left Upper Lobe Diminished   Left Lower Lobe Diminished   Additional Respiratory Assessments   Heart Rate 71   Resp 20   SpO2 98 %   Position Semi-Mccollum's   Humidification Source Heated wire   Humidification Temp 36.8   Circuit Condensation Drained   Vent Alarm Settings   High Pressure  40 cmH2O   Low Minute Volume Alarm 4 L/min   Ve Min 4   Low Exhaled Vt  250 mL   RR High 40 br/min   Apnea (Secs) 20 secs   Ambu Bag With Mask At Bedside Yes   Patient Observation   Observations 7.5 ett 23 at lip

## 2022-06-26 NOTE — PROGRESS NOTES
Hospitalist Progress Note      PCP: Jyotsna Jackson MD    Date of Admission: 6/17/2022    Subjective: admitted for resp failure from CHF , pna, eventually intubated    Ms. Danielle Cruz remains on vent support, awake on precedex, comfortable     Family decided on trach and PEG   at bedside today     lasix gtt stopped 2/2 bump in creat, on SBT trial this am for 2 hrs  Medications:  Reviewed    Infusion Medications    dexmedetomidine HCl in NaCl 0.8 mcg/kg/hr (06/26/22 0636)    sodium chloride      sodium chloride       Scheduled Medications    magnesium sulfate  1,000 mg IntraVENous Once    potassium bicarb-citric acid  20 mEq Oral Once    iron sucrose (VENOFER) iv piggyback 100 mL (Admin over 60 minutes)  100 mg IntraVENous Q24H    pantoprazole  40 mg IntraVENous BID    cefepime  1,000 mg IntraVENous Q12H    ipratropium-albuterol  1 ampule Inhalation Q4H    lidocaine 1 % injection  5 mL IntraDERmal Once    mupirocin   Nasal BID    chlorhexidine  15 mL Mouth/Throat BID    sodium chloride flush  10 mL IntraVENous 2 times per day    [Held by provider] apixaban  2.5 mg Oral BID    atorvastatin  40 mg Oral Nightly    carbidopa-levodopa  1 tablet Oral 4x Daily    DULoxetine  60 mg Oral Daily    budesonide-formoterol  2 puff Inhalation BID    lamoTRIgine  50 mg Oral BID    montelukast  10 mg Oral Daily    propafenone  150 mg Oral 3 times per day     PRN Meds: carboxymethylcellulose PF, sodium chloride (Inhalant), albuterol, sodium chloride, midazolam, fentanNYL, sodium chloride flush, sodium chloride, promethazine **OR** ondansetron, acetaminophen **OR** acetaminophen, albuterol      Intake/Output Summary (Last 24 hours) at 6/26/2022 0804  Last data filed at 6/26/2022 0445  Gross per 24 hour   Intake 1697.56 ml   Output 1405 ml   Net 292.56 ml       Physical Exam Performed:    BP (!) 125/50   Pulse 80   Temp 98.9 °F (37.2 °C) (Bladder)   Resp 19   Ht 5' 6\" (1.676 m)   Wt 274 lb 7.6 oz Contrast? None   Final Result   Colonic diverticulosis without acute diverticulitis. Nonspecific rectal wall   thickening. Partially visualized pleural effusions with adjacent compressive   atelectasis. XR CHEST PORTABLE   Final Result   Again identified is bibasilar airspace disease with small pleural effusions   and mild increased right perihilar infiltrates. This may be related to   atelectasis or pneumonia. Cardiomegaly. XR CHEST PORTABLE   Final Result   Stable examination with bilateral small effusions and bibasilar atelectasis. Stable ET tube and enteric tube. XR CHEST PORTABLE   Final Result   No acute process. Bibasilar hypoaeration      Stable cardiomegaly         IR PICC WO SQ PORT/PUMP > 5 YEARS   Final Result      XR CHEST PORTABLE   Final Result   Supportive tubing projects in stable position. Partial regression of atelectasis in the right lung base. Persisting   consolidation or or atelectasis in the left lung base. XR CHEST PORTABLE   Final Result   1. Endotracheal tube terminates in appropriate position above the nirali. 2.  Enteric tube terminates in the body of the stomach. 3.  Basilar opacities again demonstrated, right greater than left. Interval   improved aeration of the left lung base. XR CHEST PORTABLE   Final Result   Increasing opacity in the right base, lobar atelectasis versus accumulating   right pleural effusion. Combination of both entities or underlying pneumonia   also possible. Persisting left basilar airspace disease and small pleural effusion. XR CHEST PORTABLE   Final Result   Hazy bilateral lobe airspace opacities and small left pleural effusion. This   could represent atelectasis or pneumonia in the appropriate clinical setting. CT CHEST WO CONTRAST   Final Result   1. Respiratory motion in the lower lungs. The right base has linear and   dependent atelectasis.   The left does baseline actually on admission  Clinically does not appear fluid overloaded. DC IV fluids  Lasix 40 mg IV x2 6/21-with poor response-->lasix gtt  Worsening creatinine. Consulted nephrology. Dc lasix gtt       Parkinson's disease  on Sinemet.       Afib Paroxysmal.   Rythmol   Hold Eliquis given bloody secretions during bronch.      HTN - BP on the low side. Nifedipine stopped due to this and also leg edema. Consider alternative when restarting.          DVT Prophylaxis: on SCD  eliquis held for bloody secretions seen during bronch and melena  Diet: Diet NPO  ADULT TUBE FEEDING; Orogastric; Peptide Based High Protein; Continuous; 20; Yes; 15; Q 4 hours; 35; 30;  Other (specify); normal saline, 30 ml every 6 hours, per Dr. Elza Allen; Protein; one proteinex P2Go once daily  Code Status: Full Code    For trach and peg next week, hold eliquis  dw  in room    Francois Maurice MD

## 2022-06-26 NOTE — PROGRESS NOTES
RT Inhaler-Nebulizer Bronchodilator Protocol Note    There is a bronchodilator order in the chart from a provider indicating to follow the RT Bronchodilator Protocol and there is an Initiate RT Inhaler-Nebulizer Bronchodilator Protocol order as well (see protocol at bottom of note). CXR Findings:  XR CHEST PORTABLE    Result Date: 6/26/2022  Increasing basilar opacities with probable small effusions. Unchanged positioning of support devices. XR CHEST PORTABLE    Result Date: 6/25/2022  1. No significant change. The findings from the last RT Protocol Assessment were as follows:   History Pulmonary Disease: (P) Chronic pulmonary disease  Respiratory Pattern: (P) Mild dyspnea at rest, irregular pattern, or RR 21-25 bpm  Breath Sounds: (P) Slightly diminished and/or crackles  Cough: (P) Weak, productive  Indication for Bronchodilator Therapy: (P) Decreased or absent breath sounds  Bronchodilator Assessment Score: (P) 10    Aerosolized bronchodilator medication orders have been revised according to the RT Inhaler-Nebulizer Bronchodilator Protocol below. Respiratory Therapist to perform RT Therapy Protocol Assessment initially then follow the protocol. Repeat RT Therapy Protocol Assessment PRN for score 0-3 or on second treatment, BID, and PRN for scores above 3. No Indications - adjust the frequency to every 6 hours PRN wheezing or bronchospasm, if no treatments needed after 48 hours then discontinue using Per Protocol order mode. If indication present, adjust the RT bronchodilator orders based on the Bronchodilator Assessment Score as indicated below. Use Inhaler orders unless patient has one or more of the following: on home nebulizer, not able to hold breath for 10 seconds, is not alert and oriented, cannot activate and use MDI correctly, or respiratory rate 25 breaths per minute or more, then use the equivalent nebulizer order(s) with same Frequency and PRN reasons based on the score.   If a patient is on this medication at home then do not decrease Frequency below that used at home. 0-3 - enter or revise RT bronchodilator order(s) to equivalent RT Bronchodilator order with Frequency of every 4 hours PRN for wheezing or increased work of breathing using Per Protocol order mode. 4-6 - enter or revise RT Bronchodilator order(s) to two equivalent RT bronchodilator orders with one order with BID Frequency and one order with Frequency of every 4 hours PRN wheezing or increased work of breathing using Per Protocol order mode. 7-10 - enter or revise RT Bronchodilator order(s) to two equivalent RT bronchodilator orders with one order with TID Frequency and one order with Frequency of every 4 hours PRN wheezing or increased work of breathing using Per Protocol order mode. 11-13 - enter or revise RT Bronchodilator order(s) to one equivalent RT bronchodilator order with QID Frequency and an Albuterol order with Frequency of every 4 hours PRN wheezing or increased work of breathing using Per Protocol order mode. Greater than 13 - enter or revise RT Bronchodilator order(s) to one equivalent RT bronchodilator order with every 4 hours Frequency and an Albuterol order with Frequency of every 2 hours PRN wheezing or increased work of breathing using Per Protocol order mode.          Electronically signed by Lucien Gray RCP on 6/26/2022 at 11:32 AM

## 2022-06-26 NOTE — PROGRESS NOTES
[facility] Progress Note           Subjective/   80y.o. year old female who we are seeing in consultation for FARHEEN/CKD. HPI:  Patient remains intubated, she has been planned for trach and PEG for 6/27   urine output 1 L noted in last 24 hours  Creatinine 2.5  Still with significant swelling    ROS:  +weak, remains on ventilator    SH: Visitor present    Objective/   GEN:  Chronically ill, BP (!) 119/52   Pulse 89   Temp 98.9 °F (37.2 °C) (Bladder)   Resp 22   Ht 5' 6\" (1.676 m)   Wt 274 lb 7.6 oz (124.5 kg)   SpO2 95%   BMI 44.30 kg/m²   HEENT: non-icteric, no JVD  CV: S1, S2 without m/r/g; ++ LE edema  RESP: Decreased breath sounds  ABD: +bs, soft, nt, no hsm  SKIN: warm, no rashes    Data/  Recent Labs     06/24/22  0430 06/25/22  0554 06/26/22  0510   WBC 11.1* 9.8 10.6   HGB 8.9* 9.0* 8.8*   HCT 27.6* 27.1* 26.9*   MCV 85.0 86.4 85.8    245 279     Recent Labs     06/23/22  1500 06/23/22  1500 06/24/22  0430 06/25/22  0554 06/26/22  0510   NA  --   --  134* 137 137   K 3.5   < > 3.6 3.1* 3.3*   CL  --   --  103 104 106   CO2  --   --  19* 20* 19*   GLUCOSE  --   --  132* 166* 145*   PHOS  --   --   --  1.7* 3.4   MG 1.90  --   --  1.70* 1.90   BUN  --   --  50* 52* 54*   CREATININE  --   --  2.6* 2.6* 2.5*   LABGLOM  --   --  18* 18* 19*   GFRAA  --   --  21* 21* 22*    < > = values in this interval not displayed.      Component      Latest Ref Rng & Units 6/22/2022 6/22/2022           4:10 PM  4:10 PM   Color, UA      Straw/Yellow Yellow    Clarity, UA      Clear Clear    Glucose, UA      Negative mg/dL Negative    Bilirubin, Urine      Negative Negative    Ketones, Urine      Negative mg/dL TRACE (A)    Specific Gravity, UA      1.005 - 1.030 1.025    Blood, Urine      Negative Negative    pH, UA      5.0 - 8.0 5.5    Protein, UA      Negative mg/dL TRACE (A)    Urobilinogen, Urine      <2.0 E.U./dL 0.2    Nitrite, Urine      Negative Negative    Leukocyte Esterase, Urine      Negative Negative    Microscopic Examination       YES    Urine Type       NotGiven    Mucus, UA      None Seen /LPF Rare (A)    WBC, UA      0 - 5 /HPF 10-20 (A)    RBC, UA      0 - 4 /HPF 3-4    Epithelial Cells, UA      0 - 5 /HPF 0-1    Bacteria, UA      None Seen /HPF 2+ (A)    Sodium, Ur      Not Established mmol/L  <20   Potassium, Ur      Not Established mmol/L  26.3   Chloride      Not Established mmol/L  <20       Assessment/     - Acute kidney injury - multi-factorial ATN injury in setting of aspiration PNA, hypotension, & elevated vancomycin levels     - Chronic kidney disease stage 3b - background DM/HTN/CHF              SCr 1.6              Follows with [facility]     - Acute hypoxemic respiratory failure/Aspiration PNA/Pulmonary edema - on ventilator     - Diastolic CHF     - Atrial fibrillation    - Anemia - prn prbc's     - Hypertension    - Hypokalemia - prn replacement    Plan/    -Potassium IV n.p.o. as ordered  -IV calcium as ordered  -Lasix 40 mg IV x1  - Monitoring off of ARB  - Trend labs, bp's, & urine output     ____________________________________  Chico Shah MD  [facility]  [email or web address]  Office: [phone]

## 2022-06-26 NOTE — PROGRESS NOTES
06/25/22 2311   NICU Vent Information   Vent Type 980   Vent Mode AC/VC   Vt (Set, mL) 380 mL   Vt Exhaled 376 mL   Resp Rate (Set) 20 bmp   Rate Measured 20 br/min   Minute Volume 7.48 Liters   Peak Flow 50 L/min   Pressure Support 0 cmH20   FiO2  35 %   Peak Inspiratory Pressure 24 cmH2O   I:E Ratio 1:2.70   Sensitivity 3   High Peep/I Pressure 0   PEEP/CPAP (cmH2O) 5   I Time/ I Time % 0 s   Mean Airway Pressure 11 cmH20   Cough/Sputum   Sputum How Obtained Endotracheal   Cough Productive   Sputum Amount Small   Sputum Color Creamy   Tenacity Thick   Breath Sounds   Right Upper Lobe Diminished   Right Middle Lobe Diminished   Right Lower Lobe Diminished   Left Upper Lobe Diminished   Left Lower Lobe Diminished   Additional Respiratory Assessments   Heart Rate 73   Resp 20   SpO2 91 %   Vent Alarm Settings   High Pressure  40 cmH2O   Low Minute Volume Alarm 4 L/min   Ve Min 4   Low Exhaled Vt  250 mL   RR High 40 br/min   Apnea (Secs) 20 secs   Ambu Bag With Mask At Bedside Yes   Patient Observation   Observations 7.5 ett 23 at lip

## 2022-06-26 NOTE — PROGRESS NOTES
06/26/22 0308   NICU Vent Information   Vent Type 980   Vent Mode AC/VC   Vt (Set, mL) 380 mL   Vt Exhaled 393 mL   Resp Rate (Set) 20 bmp   Rate Measured 34 br/min   Minute Volume 5.61 Liters   Peak Flow 50 L/min   Pressure Support 0 cmH20   FiO2  35 %   Peak Inspiratory Pressure 29 cmH2O   I:E Ratio 1:2.70   Sensitivity 3   High Peep/I Pressure 0   PEEP/CPAP (cmH2O) 5   I Time/ I Time % 0 s   Mean Airway Pressure 16 cmH20   Cough/Sputum   Sputum How Obtained Suctioned;Endotracheal   Sputum Amount Small   Sputum Color Creamy   Tenacity Thick   Breath Sounds   Right Upper Lobe Diminished   Right Middle Lobe Diminished   Right Lower Lobe Diminished   Left Upper Lobe Diminished   Left Lower Lobe Diminished   Additional Respiratory Assessments   Heart Rate 71   Resp 16   SpO2 99 %   Position Semi-Mccollum's   Vent Alarm Settings   High Pressure  40 cmH2O   Low Minute Volume Alarm 4 L/min   RR High 40 br/min   ETT (adult)   Placement Date/Time: 06/18/22 1650   Present on Admission/Arrival: No  Placement Verified By: Direct visualization;Colorimetric ETCO2 device  Preoxygenation: Yes  Airway Tube Size: 8 mm  Laryngoscope: GlideScope  Blade Size: 3  Location: Oral  Secured. ..    ETT Placement Right  (from center)

## 2022-06-26 NOTE — PROGRESS NOTES
Care rounds completed with Dr. Wendy Malloy and multidisciplinary team. Reviewed labs, meds, VS, assessment, & plan of care for today. See dictated note and new orders for details.

## 2022-06-26 NOTE — PROGRESS NOTES
Pulmonary & Critical Care Medicine ICU Progress Note    CC: shortness of breath     Events of Last 24 hours:   Failing SBT due to low lung volumes, high respiratory rate  Moderate volume stool   Mouth pain this morning     Vascular lines: IV:   2022 PICC    MV: 2022 after failing Vapotherm  Vent Mode: AC/VC Resp Rate (Set): 20 bmp/Vt (Set, mL): 380 mL/ /FiO2 : 35 %  Recent Labs     22  0602 22  0510   PHART 7.342* 7.336*   OXQ8CGP 38.1 33.3*   PO2ART 82.8 90.8       IV:   dexmedetomidine HCl in NaCl 0.8 mcg/kg/hr (22 0636)    sodium chloride      sodium chloride         Vitals:  Blood pressure (!) 125/50, pulse 80, temperature 98.9 °F (37.2 °C), temperature source Bladder, resp. rate 19, height 5' 6\" (1.676 m), weight 274 lb 7.6 oz (124.5 kg), SpO2 90 %, not currently breastfeeding. on vent  Temp  Av °F (37.2 °C)  Min: 98.8 °F (37.1 °C)  Max: 99.6 °F (37.6 °C)    Intake/Output Summary (Last 24 hours) at 2022 0744  Last data filed at 2022 0445  Gross per 24 hour   Intake 1727.56 ml   Output 1405 ml   Net 322.56 ml     PE:  General: NAD   ENT: Pharynx with ETT. Resp: No crackles. No wheezing. CV: S1, S2. + edema  GI: NT, ND, +BS  Skin: Warm and dry. Neuro: PERRL. Awake and following commands.  Patellar reflexes are symmetric      Scheduled Meds:   iron sucrose (VENOFER) iv piggyback 100 mL (Admin over 60 minutes)  100 mg IntraVENous Q24H    pantoprazole  40 mg IntraVENous BID    cefepime  1,000 mg IntraVENous Q12H    ipratropium-albuterol  1 ampule Inhalation Q4H    lidocaine 1 % injection  5 mL IntraDERmal Once    mupirocin   Nasal BID    chlorhexidine  15 mL Mouth/Throat BID    sodium chloride flush  10 mL IntraVENous 2 times per day    [Held by provider] apixaban  2.5 mg Oral BID    atorvastatin  40 mg Oral Nightly    carbidopa-levodopa  1 tablet Oral 4x Daily    DULoxetine  60 mg Oral Daily    budesonide-formoterol  2 puff Inhalation BID    lamoTRIgine  50 mg Oral BID    montelukast  10 mg Oral Daily    propafenone  150 mg Oral 3 times per day       Data:  CBC:   Recent Labs     06/24/22  0430 06/25/22  0554 06/26/22  0510   WBC 11.1* 9.8 10.6   HGB 8.9* 9.0* 8.8*   HCT 27.6* 27.1* 26.9*   MCV 85.0 86.4 85.8    245 279     BMP:   Recent Labs     06/24/22  0430 06/25/22  0554 06/26/22  0510   * 137 137   K 3.6 3.1* 3.3*    104 106   CO2 19* 20* 19*   PHOS  --  1.7* 3.4   BUN 50* 52* 54*   CREATININE 2.6* 2.6* 2.5*     LIVER PROFILE:   No results for input(s): AST, ALT, LIPASE, BILIDIR, BILITOT, ALKPHOS in the last 72 hours. Invalid input(s): AMYLASE,  ALB    Microbiology:  6/17/2022 SARS-CoV-2 and influenza are negative  6/17/2022 MRSA DNA screen positive  6/17/2022 blood NG  6/17/2022 sputum NRF  6/18/2022 tracheal aspirate NRF  6/19/2022 BAL Candida  6/19/2022 respiratory viral panel negative    Imaging:  CXR 6/26/2022 LL atelectasis with small effusions ET tube okay    ACT 6/23/22   Small right and very small effusions, diverticulosis, nonspecific rectal wall thickening      ASSESSMENT:  · Acute hypoxemic respiratory failure  · Aspiration pneumonia  · BETSY on BiPAP 22/15 with 1 L bleed in   · Parkinson's disease  · Atrial fibrillation  · Acute on chronic kidney failure, baseline creatinine 1.6  · Acute on chronic anemia, s/p 2 U PRBC 6/23/2022  · H/O esophagitis  · Pleural effusions are small bilaterally    PLAN:  Mechanical ventilation as per my orders. The ventilator was adjusted by me at the bedside for unstable, life threatening respiratory failure. She has not shown improvement over last 3-4 days  IV Precedex for sedation, target RASS -2, with daily spontaneous awakening trial.  Fentanyl gtt as needed.    Head of bed 30 degrees or higher at all times  · Daily spontaneous breathing trial  · Antibiotic day #10/10, cefepime & vancomycin  · Inhaled bronchodilators  · IV iron per GI recommendation  · KPhos and calcium replaced this am, f/u Mg level    · Protonix twice daily, Eliquis is held, GI has seen  · Lasix drip is now off, nephrology following  · Prophylaxis: SCD, Bactroban, Protonix BID  · Consult GI for PEG and ENT for trach/PEG tomorrow. I d/w patient and spouse today. Total critical care time caring for this patient with life threatening, unstable organ failure, including direct patient contact, management of life support systems, review of data including imaging and labs, discussions with other team members and physicians is 35 minutes so far today, excluding procedures.

## 2022-06-26 NOTE — PROGRESS NOTES
Shift assessment, completed, see flow sheet. Pt is alert and oriented x 1. Following commands. Intubated and sedated with a # 8 ETT, at 23 LL. On AC 20 / 380 /35 %/ 5. SR 80, /50, SpO2 90%. Respirations are easy, even, and unlabored. Bilateral lung sounds dimished. OG in place at 75, with TF at 35 mL/hr, 0 residual.      PICC,PIV, WNL with Precedex infusing. Zimmerman in place and patent with STAT lock. Bilateral soft wrist restraints in place for pt safety. Call light within reach. Bed in lowest position. Bed alarm on.

## 2022-06-26 NOTE — PROGRESS NOTES
Shift assessment completed, see flow sheet. RASS -1. Following commands.   - Precedex infusing at 0.8 mcg/kg/h. Intubated and sedated on AC # 8.0 ETT, at 23 LL. 20 / 380 / 35 %/ +5. SpO2 93%. Respirations are easy, even, and unlabored. Bilateral lung sounds diminished throughout. VSS  OG in place at 75, with TF at goal of 35mL/hr, low residual.      PICC and PIV, WDL. All lines and monitoring devices in place. Zimmerman is patent and secured. Bilateral soft wrist restraints in place for patient safety. Bed in lowest position with wheels locked.

## 2022-06-26 NOTE — PROGRESS NOTES
06/26/22 1900   RT Protocol   History Pulmonary Disease 2   Respiratory pattern 4   Breath sounds 2   Cough 2   Indications for Bronchodilator Therapy Decreased or absent breath sounds   Bronchodilator Assessment Score 10   RT Inhaler-Nebulizer Bronchodilator Protocol Note    There is a bronchodilator order in the chart from a provider indicating to follow the RT Bronchodilator Protocol and there is an Initiate RT Inhaler-Nebulizer Bronchodilator Protocol order as well (see protocol at bottom of note). CXR Findings:  XR CHEST PORTABLE    Result Date: 6/26/2022  Increasing basilar opacities with probable small effusions. Unchanged positioning of support devices. XR CHEST PORTABLE    Result Date: 6/25/2022  1. No significant change. The findings from the last RT Protocol Assessment were as follows:   History Pulmonary Disease: Chronic pulmonary disease  Respiratory Pattern: Mild dyspnea at rest, irregular pattern, or RR 21-25 bpm  Breath Sounds: Slightly diminished and/or crackles  Cough: Weak, productive  Indication for Bronchodilator Therapy: Decreased or absent breath sounds  Bronchodilator Assessment Score: 10    Aerosolized bronchodilator medication orders have been revised according to the RT Inhaler-Nebulizer Bronchodilator Protocol below. Respiratory Therapist to perform RT Therapy Protocol Assessment initially then follow the protocol. Repeat RT Therapy Protocol Assessment PRN for score 0-3 or on second treatment, BID, and PRN for scores above 3. No Indications - adjust the frequency to every 6 hours PRN wheezing or bronchospasm, if no treatments needed after 48 hours then discontinue using Per Protocol order mode. If indication present, adjust the RT bronchodilator orders based on the Bronchodilator Assessment Score as indicated below.   Use Inhaler orders unless patient has one or more of the following: on home nebulizer, not able to hold breath for 10 seconds, is not alert and oriented, cannot activate and use MDI correctly, or respiratory rate 25 breaths per minute or more, then use the equivalent nebulizer order(s) with same Frequency and PRN reasons based on the score. If a patient is on this medication at home then do not decrease Frequency below that used at home. 0-3 - enter or revise RT bronchodilator order(s) to equivalent RT Bronchodilator order with Frequency of every 4 hours PRN for wheezing or increased work of breathing using Per Protocol order mode. 4-6 - enter or revise RT Bronchodilator order(s) to two equivalent RT bronchodilator orders with one order with BID Frequency and one order with Frequency of every 4 hours PRN wheezing or increased work of breathing using Per Protocol order mode. 7-10 - enter or revise RT Bronchodilator order(s) to two equivalent RT bronchodilator orders with one order with TID Frequency and one order with Frequency of every 4 hours PRN wheezing or increased work of breathing using Per Protocol order mode. 11-13 - enter or revise RT Bronchodilator order(s) to one equivalent RT bronchodilator order with QID Frequency and an Albuterol order with Frequency of every 4 hours PRN wheezing or increased work of breathing using Per Protocol order mode. Greater than 13 - enter or revise RT Bronchodilator order(s) to one equivalent RT bronchodilator order with every 4 hours Frequency and an Albuterol order with Frequency of every 2 hours PRN wheezing or increased work of breathing using Per Protocol order mode.      Electronically signed by Shirin Mayo RCP on 6/26/2022 at 7:35 PM

## 2022-06-27 ENCOUNTER — APPOINTMENT (OUTPATIENT)
Dept: GENERAL RADIOLOGY | Age: 82
DRG: 004 | End: 2022-06-27
Payer: MEDICARE

## 2022-06-27 LAB
ANION GAP SERPL CALCULATED.3IONS-SCNC: 13 MMOL/L (ref 3–16)
BASE EXCESS ARTERIAL: -6.9 MMOL/L (ref -3–3)
BASE EXCESS ARTERIAL: -7 MMOL/L (ref -3–3)
BASOPHILS ABSOLUTE: 0 K/UL (ref 0–0.2)
BASOPHILS RELATIVE PERCENT: 0.1 %
BUN BLDV-MCNC: 59 MG/DL (ref 7–20)
CALCIUM SERPL-MCNC: 7.8 MG/DL (ref 8.3–10.6)
CARBOXYHEMOGLOBIN ARTERIAL: 0.3 % (ref 0–1.5)
CARBOXYHEMOGLOBIN ARTERIAL: 0.3 % (ref 0–1.5)
CHLORIDE BLD-SCNC: 105 MMOL/L (ref 99–110)
CO2: 19 MMOL/L (ref 21–32)
CREAT SERPL-MCNC: 2.7 MG/DL (ref 0.6–1.2)
EOSINOPHILS ABSOLUTE: 0.3 K/UL (ref 0–0.6)
EOSINOPHILS RELATIVE PERCENT: 2.4 %
GFR AFRICAN AMERICAN: 21
GFR NON-AFRICAN AMERICAN: 17
GLUCOSE BLD-MCNC: 103 MG/DL (ref 70–99)
GLUCOSE BLD-MCNC: 137 MG/DL (ref 70–99)
GLUCOSE BLD-MCNC: 145 MG/DL (ref 70–99)
GLUCOSE BLD-MCNC: 155 MG/DL (ref 70–99)
GLUCOSE BLD-MCNC: 97 MG/DL (ref 70–99)
HCO3 ARTERIAL: 18.4 MMOL/L (ref 21–29)
HCO3 ARTERIAL: 18.5 MMOL/L (ref 21–29)
HCT VFR BLD CALC: 26.2 % (ref 36–48)
HEMOGLOBIN, ART, EXTENDED: 10.1 G/DL (ref 12–16)
HEMOGLOBIN, ART, EXTENDED: 9.4 G/DL (ref 12–16)
HEMOGLOBIN: 8.4 G/DL (ref 12–16)
LYMPHOCYTES ABSOLUTE: 1 K/UL (ref 1–5.1)
LYMPHOCYTES RELATIVE PERCENT: 8.3 %
MAGNESIUM: 2.1 MG/DL (ref 1.8–2.4)
MCH RBC QN AUTO: 27.6 PG (ref 26–34)
MCHC RBC AUTO-ENTMCNC: 32 G/DL (ref 31–36)
MCV RBC AUTO: 86.1 FL (ref 80–100)
METHEMOGLOBIN ARTERIAL: 0.3 %
METHEMOGLOBIN ARTERIAL: 0.3 %
MONOCYTES ABSOLUTE: 1.1 K/UL (ref 0–1.3)
MONOCYTES RELATIVE PERCENT: 8.6 %
NEUTROPHILS ABSOLUTE: 9.9 K/UL (ref 1.7–7.7)
NEUTROPHILS RELATIVE PERCENT: 80.6 %
O2 SAT, ARTERIAL: 95.3 %
O2 SAT, ARTERIAL: 95.3 %
O2 THERAPY: ABNORMAL
O2 THERAPY: ABNORMAL
PCO2 ARTERIAL: 36.4 MMHG (ref 35–45)
PCO2 ARTERIAL: 36.8 MMHG (ref 35–45)
PDW BLD-RTO: 14.4 % (ref 12.4–15.4)
PERFORMED ON: ABNORMAL
PERFORMED ON: NORMAL
PH ARTERIAL: 7.32 (ref 7.35–7.45)
PH ARTERIAL: 7.32 (ref 7.35–7.45)
PHOSPHORUS: 3.5 MG/DL (ref 2.5–4.9)
PLATELET # BLD: 267 K/UL (ref 135–450)
PMV BLD AUTO: 8.1 FL (ref 5–10.5)
PO2 ARTERIAL: 81.5 MMHG (ref 75–108)
PO2 ARTERIAL: 81.7 MMHG (ref 75–108)
POTASSIUM SERPL-SCNC: 3.7 MMOL/L (ref 3.5–5.1)
RBC # BLD: 3.05 M/UL (ref 4–5.2)
SODIUM BLD-SCNC: 137 MMOL/L (ref 136–145)
TCO2 ARTERIAL: 19.6 MMOL/L
TCO2 ARTERIAL: 19.6 MMOL/L
WBC # BLD: 12.3 K/UL (ref 4–11)

## 2022-06-27 PROCEDURE — 94660 CPAP INITIATION&MGMT: CPT

## 2022-06-27 PROCEDURE — 2500000003 HC RX 250 WO HCPCS: Performed by: INTERNAL MEDICINE

## 2022-06-27 PROCEDURE — 6370000000 HC RX 637 (ALT 250 FOR IP): Performed by: INTERNAL MEDICINE

## 2022-06-27 PROCEDURE — 2000000000 HC ICU R&B

## 2022-06-27 PROCEDURE — 83735 ASSAY OF MAGNESIUM: CPT

## 2022-06-27 PROCEDURE — C9113 INJ PANTOPRAZOLE SODIUM, VIA: HCPCS | Performed by: INTERNAL MEDICINE

## 2022-06-27 PROCEDURE — 85025 COMPLETE CBC W/AUTO DIFF WBC: CPT

## 2022-06-27 PROCEDURE — 94003 VENT MGMT INPAT SUBQ DAY: CPT

## 2022-06-27 PROCEDURE — 36600 WITHDRAWAL OF ARTERIAL BLOOD: CPT

## 2022-06-27 PROCEDURE — 71045 X-RAY EXAM CHEST 1 VIEW: CPT

## 2022-06-27 PROCEDURE — 99231 SBSQ HOSP IP/OBS SF/LOW 25: CPT | Performed by: STUDENT IN AN ORGANIZED HEALTH CARE EDUCATION/TRAINING PROGRAM

## 2022-06-27 PROCEDURE — 99291 CRITICAL CARE FIRST HOUR: CPT | Performed by: INTERNAL MEDICINE

## 2022-06-27 PROCEDURE — 80048 BASIC METABOLIC PNL TOTAL CA: CPT

## 2022-06-27 PROCEDURE — 84100 ASSAY OF PHOSPHORUS: CPT

## 2022-06-27 PROCEDURE — 2580000003 HC RX 258: Performed by: INTERNAL MEDICINE

## 2022-06-27 PROCEDURE — 6360000002 HC RX W HCPCS: Performed by: INTERNAL MEDICINE

## 2022-06-27 PROCEDURE — 82803 BLOOD GASES ANY COMBINATION: CPT

## 2022-06-27 PROCEDURE — 2700000000 HC OXYGEN THERAPY PER DAY

## 2022-06-27 PROCEDURE — 94761 N-INVAS EAR/PLS OXIMETRY MLT: CPT

## 2022-06-27 PROCEDURE — 99233 SBSQ HOSP IP/OBS HIGH 50: CPT | Performed by: INTERNAL MEDICINE

## 2022-06-27 PROCEDURE — 94640 AIRWAY INHALATION TREATMENT: CPT

## 2022-06-27 RX ORDER — IPRATROPIUM BROMIDE AND ALBUTEROL SULFATE 2.5; .5 MG/3ML; MG/3ML
1 SOLUTION RESPIRATORY (INHALATION)
Status: DISCONTINUED | OUTPATIENT
Start: 2022-06-27 | End: 2022-07-04

## 2022-06-27 RX ADMIN — IPRATROPIUM BROMIDE AND ALBUTEROL SULFATE 1 AMPULE: 2.5; .5 SOLUTION RESPIRATORY (INHALATION) at 14:53

## 2022-06-27 RX ADMIN — SODIUM CHLORIDE, PRESERVATIVE FREE 10 ML: 5 INJECTION INTRAVENOUS at 20:44

## 2022-06-27 RX ADMIN — Medication 0.8 MCG/KG/HR: at 01:03

## 2022-06-27 RX ADMIN — CARBIDOPA AND LEVODOPA 1 TABLET: 25; 100 TABLET ORAL at 13:05

## 2022-06-27 RX ADMIN — PROPAFENONE HYDROCHLORIDE 150 MG: 150 TABLET, FILM COATED ORAL at 05:06

## 2022-06-27 RX ADMIN — PANTOPRAZOLE SODIUM 40 MG: 40 INJECTION, POWDER, LYOPHILIZED, FOR SOLUTION INTRAVENOUS at 07:56

## 2022-06-27 RX ADMIN — LAMOTRIGINE 50 MG: 25 TABLET ORAL at 08:03

## 2022-06-27 RX ADMIN — Medication 2 PUFF: at 07:01

## 2022-06-27 RX ADMIN — SODIUM CHLORIDE, PRESERVATIVE FREE 10 ML: 5 INJECTION INTRAVENOUS at 07:57

## 2022-06-27 RX ADMIN — Medication 0.8 MCG/KG/HR: at 05:20

## 2022-06-27 RX ADMIN — PROPAFENONE HYDROCHLORIDE 150 MG: 150 TABLET, FILM COATED ORAL at 13:05

## 2022-06-27 RX ADMIN — MONTELUKAST SODIUM 10 MG: 10 TABLET, COATED ORAL at 07:56

## 2022-06-27 RX ADMIN — MUPIROCIN: 20 OINTMENT TOPICAL at 07:56

## 2022-06-27 RX ADMIN — PANTOPRAZOLE SODIUM 40 MG: 40 INJECTION, POWDER, LYOPHILIZED, FOR SOLUTION INTRAVENOUS at 20:44

## 2022-06-27 RX ADMIN — Medication 15 ML: at 20:45

## 2022-06-27 RX ADMIN — IPRATROPIUM BROMIDE AND ALBUTEROL SULFATE 1 AMPULE: 2.5; .5 SOLUTION RESPIRATORY (INHALATION) at 22:52

## 2022-06-27 RX ADMIN — IPRATROPIUM BROMIDE AND ALBUTEROL SULFATE 1 AMPULE: 2.5; .5 SOLUTION RESPIRATORY (INHALATION) at 11:31

## 2022-06-27 RX ADMIN — IPRATROPIUM BROMIDE AND ALBUTEROL SULFATE 1 AMPULE: 2.5; .5 SOLUTION RESPIRATORY (INHALATION) at 19:03

## 2022-06-27 RX ADMIN — IPRATROPIUM BROMIDE AND ALBUTEROL SULFATE 1 AMPULE: 2.5; .5 SOLUTION RESPIRATORY (INHALATION) at 07:01

## 2022-06-27 RX ADMIN — IPRATROPIUM BROMIDE AND ALBUTEROL SULFATE 1 AMPULE: 2.5; .5 SOLUTION RESPIRATORY (INHALATION) at 03:16

## 2022-06-27 RX ADMIN — IRON SUCROSE 100 MG: 20 INJECTION, SOLUTION INTRAVENOUS at 08:27

## 2022-06-27 RX ADMIN — DULOXETINE HYDROCHLORIDE 60 MG: 60 CAPSULE, DELAYED RELEASE ORAL at 07:56

## 2022-06-27 RX ADMIN — Medication 15 ML: at 07:56

## 2022-06-27 RX ADMIN — CARBIDOPA AND LEVODOPA 1 TABLET: 25; 100 TABLET ORAL at 07:56

## 2022-06-27 ASSESSMENT — PULMONARY FUNCTION TESTS
PIF_VALUE: 22
PIF_VALUE: 23
PIF_VALUE: 22
PIF_VALUE: 26
PIF_VALUE: 26
PIF_VALUE: 23
PIF_VALUE: 24
PIF_VALUE: 24
PIF_VALUE: 25
PIF_VALUE: 15

## 2022-06-27 ASSESSMENT — PAIN SCALES - GENERAL
PAINLEVEL_OUTOF10: 0

## 2022-06-27 NOTE — PROGRESS NOTES
trach/PEG if she is not extubated. Dr. Ron Sousa d/w patient and spouse yesterday. Total critical care time caring for this patient with life threatening, unstable organ failure, including direct patient contact, management of life support systems, review of data including imaging and labs, discussions with other team members and physicians is 35 minutes so far today, excluding procedures.

## 2022-06-27 NOTE — CONSULTS
Seth      Patient Name: [address] [place] [address] Record Number:  [id number]  Primary Care Physician:  Christian Bhakta MD  Date of Consultation: 6/27/2022    Chief Complaint:   Chief Complaint   Patient presents with    Shortness of [place] is a(n) 80 y.o. female who was evaluated by ENT for possible trach placement. She ultimately required intubation on June 18 and has not been able to wean from the vent. She has a history of a spinal fusion and a carotid endarterectomy but no airway procedures.       Patient Active Problem List   Diagnosis    GERD (gastroesophageal reflux disease)    Essential hypertension    Hyperlipidemia    Restless leg syndrome    Anemia of chronic disease    Postural dizziness    Moderate persistent asthma    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-sided sciatica    Morbid obesity with BMI of 40.0-44.9, adult ([facility])    Type 2 diabetes mellitus with stage 4 chronic kidney disease, without long-term current use of insulin ([facility])    CAD (coronary artery disease)    Parkinson disease ([facility])    Severe obstructive sleep apnea    Hypothyroidism    Hepatic steatosis    Diverticulosis    Blind left eye    L carotid artery stenosis s/p CEA    Septicemia ([address]    PAF (paroxysmal atrial fibrillation) ([facility])    COPD (chronic obstructive pulmonary disease) (HCC)    Acute respiratory failure with hypoxia (HCC)    Diastolic congestive heart failure (HCC)    Partial symptomatic epilepsy with complex partial seizures, not intractable, without status epilepticus ([address]    Chronic kidney disease    Colonic pseudoobstruction    Sialadenitis    Chest pain    Pulmonary HTN ([address]    Mitral valve insufficiency    C. difficile colitis    Laceration of left lower extremity    Pulmonary nodules    Sensorineural hearing loss, bilateral    Shortness of breath    Aspiration pneumonia of left lower lobe (HCC)    Mild protein-calorie malnutrition ([address]     Past Surgical History:   Procedure Laterality Date    APPENDECTOMY      CAROTID ENDARTERECTOMY      left    CATARACT REMOVAL      CERVICAL FUSION  11/2010    CHOLECYSTECTOMY      COLONOSCOPY N/A 3/8/2020    COLONOSCOPY FLEXIBLE W/ DECOMPRESSION performed by Alex Terry MD at [facility]      HERNIA REPAIR      HYSTERECTOMY (CERVIX STATUS UNKNOWN)      IR MIDLINE CATH  10/8/2021    IR MIDLINE CATH 10/8/2021 [address] SPECIAL PROCEDURES    JOINT REPLACEMENT Bilateral 2003    KNEE SURGERY      replacement x 2    OVARY REMOVAL      SHOULDER SURGERY      TONSILLECTOMY      TUBAL LIGATION      UPPER GASTROINTESTINAL ENDOSCOPY  03/17/2020    Esophagitis    UPPER GASTROINTESTINAL ENDOSCOPY N/A 3/17/2020    EGD performed by Chris Arredondo MD at [facility]       Family History   Problem Relation Age of Onset    Diabetes Mother     Heart Disease Mother     Diabetes Father     Heart Disease Father     Diabetes Sister      Social History     Socioeconomic History    Marital status:      Spouse name: Duke Lieberman Number of children: 2    Years of education: 15    Highest education level: Not on file   Occupational History    Not on file   Tobacco Use    Smoking status: Former Smoker    Smokeless tobacco: Never Used   Vaping Use    Vaping Use: Never used   Substance and Sexual Activity    Alcohol use: No    Drug use: No    Sexual activity: Not Currently   Other Topics Concern    Not on file   Social History Narrative    ** Merged History Encounter **          Social Determinants of Health     Financial Resource Strain:     Difficulty of Paying Living Expenses: Not on file   Food Insecurity:     Worried About [address] in the Last Year: Not on file    Deja of Food in the Last Year: Not on H. GUERA Posey Needs:     Lack of Transportation (Medical): Not on file    Lack of Transportation (Non-Medical): Not on file   Physical Activity:     Days of Exercise per Week: Not on file    Minutes of Exercise per Session: Not on file   Stress:     Feeling of Stress : Not on file   Social Connections:     Frequency of Communication with Friends and Family: Not on file    Frequency of Social Gatherings with Friends and Family: Not on file    Attends Judaism Services: Not on file    Active Member of [address] [place] [address] or Organizations: Not on file    Attends Club or Organization Meetings: Not on file    Marital Status: Not on file   Intimate Partner Violence:     Fear of Current or Ex-Partner: Not on file    Emotionally Abused: Not on file    Physically Abused: Not on file    Sexually Abused: Not on file   Housing Stability: [address] Unable to Pay for Housing in the Last Year: No    Number of [place] in the Last Year: 1    Unstable Housing in the Last Year: No       DRUG/FOOD ALLERGIES: Levaquin [levofloxacin], Metoclopramide, Phenazopyridine, Pyridium [phenazopyridine hcl], Reglan [metoclopramide hcl], and Reglan [metoclopramide hcl]    CURRENT MEDICATIONS  Prior to Admission medications    Medication Sig Start Date End Date Taking? Authorizing Provider   lidocaine (XYLOCAINE) 5 % ointment Apply topically every 3 hours as needed for Pain Apply topically as needed.    Yes Historical Provider, MD   cetirizine (ZYRTEC) 10 MG tablet TAKE 1/2 TABLET BY MOUTH EVERY DAY 6/20/22   Jeannie Fitch MD   montelukast (SINGULAIR) 10 MG tablet TAKE 1 TABLET EVERY DAY 5/26/22   MAHAD Wong CNP   apixaban (ELIQUIS) 2.5 MG TABS tablet Take 1 tablet by mouth 2 times daily 5/24/22   Atilio Lee MD   valsartan (DIOVAN) 80 MG tablet Take 1 tablet by mouth daily 5/24/22   Atilio Lee MD   albuterol sulfate  (90 Base) MCG/ACT inhaler INHALE 2 PUFFS INTO THE LUNGS EVERY 6 HOURS AS NEEDED FOR WHEEZING 4/14/22   MAHAD Wood CNP   furosemide (LASIX) 40 MG tablet TAKE 1/2 TABLET EVERY DAY 3/2/22   Carlyn Moore MD   NIFEdipine (PROCARDIA XL) 90 MG extended release tablet TAKE 1 TABLET EVERY DAY 3/2/22   MAHAD Cummings CNP   metoprolol tartrate (LOPRESSOR) 50 MG tablet TAKE 1 TABLET TWICE DAILY 3/2/22   MAHAD Garcia CNP   DULoxetine (CYMBALTA) 60 MG extended release capsule TAKE 1 CAPSULE EVERY DAY 3/2/22   MAHAD Garcia CNP   fluticasone-vilanterol (BREO ELLIPTA) 100-25 MCG/INH AEPB inhaler INHALE 1 PUFF EVERY DAY 12/22/21   MAHAD Wood CNP   atorvastatin (LIPITOR) 40 MG tablet TAKE 1 AND 1/2 TABLETS EVERY DAY 11/4/21   Carlyn Moore MD   zinc oxide 16 % OINT ointment Apply topically 4 times daily as needed for Dry Skin  Patient not taking: Reported on 6/17/2022 11/3/21   MAHAD Garcia CNP   propafenone (RYTHMOL) 150 MG tablet TAKE 1 TABLET BY MOUTH EVERY 8 HOURS 11/3/21   Helena Hollis MD   ferrous sulfate (IRON 325) 325 (65 Fe) MG tablet Take 325 mg by mouth 2 times daily  2/4/21 12/2/21  Historical Provider, MD   fluticasone (FLONASE) 50 MCG/ACT nasal spray USE 2 PUFFS IN EACH NOSTRIL DAILY 1/8/21   BIJAN Dougherty   carbidopa-levodopa (SINEMET)  MG per tablet Take 1 tablet by mouth 4 times daily    Historical Provider, MD   Alpha-Lipoic Acid 300 MG TABS Take by mouth daily    Historical Provider, MD   lamoTRIgine (LAMICTAL) 25 MG tablet Take 50 mg by mouth 2 times daily     Historical Provider, MD   Handicap Placard MISC by Does not apply route DX: Collette Moose  parkinson's disease  Exp: 9/1/2024 9/27/19   BIJAN Dougherty   ipratropium-albuterol (DUONEB) 0.5-2.5 (3) MG/3ML SOLN nebulizer solution Inhale 3 mLs into the lungs every 6 hours as needed for Shortness of Breath DX: J45.40 2/6/19   BIJAN Dougherty   Multiple Vitamins-Minerals (THERAPEUTIC MULTIVITAMIN-MINERALS) tablet Take 1 tablet by mouth daily    Historical Provider, MD   solifenacin (VESICARE) 5 MG tablet Take 1 tablet by mouth daily. 6/25/13 10/29/13  Avril Parry MD       REVIEW OF SYSTEMS  The following systems were reviewed and revealed the following in addition to any already discussed in the HPI:    Review of Systems   Unable to perform ROS: Intubated          PHYSICAL EXAM  BP (!) 129/50   Pulse 83   Temp 98.6 °F (37 °C) (Bladder)   Resp 29   Ht 5' 6\" (1.676 m)   Wt 264 lb 8.8 oz (120 kg)   SpO2 98%   BMI 42.70 kg/m²     GENERAL: No Acute Distress  EYES: EOMI, Anti-icteric  NOSE: No epistaxis, nasal mucosa within normal limits, no purulent drainage  EARS: Normal external canal appearance, EAC patent bilaterally  FACE: 1/6 House-Brackmann Scale, symmetric, sensation equal bilaterally  ORAL CAVITY: 7.5 ET tube in place and secured  NECK: Normal range of motion, no thyromegaly, trachea is midline, no lymphadenopathy, no neck masses, no crepitus, previous carotid endarterectomy scar on the left and ACDF incision on the right  CHEST: On the ventilator  SKIN: No rashes, normal appearing skin, no evidence of skin lesions/tumors    RADIOLOGY  Summary of findings:      PROCEDURE      ASSESSMENT/PLAN  Scott Bautista is a very pleasant 80 y.o. female with acute respiratory failure requiring intubation and long-term ventilation. ENT was consulted for possible tracheostomy tube placement. They will continue to try to wean her from the vent and do not wish to proceed with a tracheostomy before Wednesday. We will plan for tracheostomy to be placed on Thursday around noon if it is required. Please hold anticoagulation and tube feeds on midnight of Wednesday the 29th. Medical Decision Making:   The following items were considered in medical decision making:  Independent review of images  Review / order clinical lab tests  Review / order radiology tests  Decision to obtain old records

## 2022-06-27 NOTE — PROGRESS NOTES
Pt extubated to bipap 16/8, FiO2 35%           06/27/22 1354   NIV Type   $NIV $Daily Charge   Skin Assessment Clean, dry, & intact   Skin Protection for O2 Device Yes   NIV Started/Stopped On   Mode Bilevel   Mask Type Full face mask   Mask Size Medium   Settings/Measurements   IPAP 18 cmH20   CPAP/EPAP 8 cmH2O   Rate Ordered 14   Resp 30   Insp Rise Time (%) 2 %   FiO2  35 %   I Time/ I Time % 0.9 s   Vt Exhaled 576 mL   Minute Volume 16.2 Liters   Mask Leak (lpm) 1 lpm   Comfort Level Good   Using Accessory Muscles No   SpO2 98   Breath Sounds   Right Upper Lobe Rhonchi   Right Middle Lobe Diminished   Right Lower Lobe Diminished   Left Upper Lobe Rhonchi   Left Lower Lobe Diminished   Alarm Settings   Alarms On Y   Low Pressure 8 cmH2O   High Pressure  40 cmH2O   Apnea (secs) 20 secs   RR Low  14   RR High 40 br/min   Oxygen Therapy/Pulse Ox   Heart Rate (!) 114   SpO2 98 %

## 2022-06-27 NOTE — PLAN OF CARE
Problem: Discharge Planning  Goal: Discharge to home or other facility with appropriate resources  Outcome: Progressing  Flowsheets (Taken 6/26/2022 2000)  Discharge to home or other facility with appropriate resources: Identify barriers to discharge with patient and caregiver     Problem: Pain  Goal: Verbalizes/displays adequate comfort level or baseline comfort level  Outcome: Progressing  Flowsheets (Taken 6/26/2022 2000)  Verbalizes/displays adequate comfort level or baseline comfort level:   Encourage patient to monitor pain and request assistance   Assess pain using appropriate pain scale   Administer analgesics based on type and severity of pain and evaluate response   Implement non-pharmacological measures as appropriate and evaluate response     Problem: ABCDS Injury Assessment  Goal: Absence of physical injury  Outcome: Progressing     Problem: Skin/Tissue Integrity  Goal: Absence of new skin breakdown  Description: 1. Monitor for areas of redness and/or skin breakdown  2. Assess vascular access sites hourly  3. Every 4-6 hours minimum:  Change oxygen saturation probe site  4. Every 4-6 hours:  If on nasal continuous positive airway pressure, respiratory therapy assess nares and determine need for appliance change or resting period.   Outcome: Progressing     Problem: Chronic Conditions and Co-morbidities  Goal: Patient's chronic conditions and co-morbidity symptoms are monitored and maintained or improved  Outcome: Progressing  Flowsheets (Taken 6/26/2022 2000)  Care Plan - Patient's Chronic Conditions and Co-Morbidity Symptoms are Monitored and Maintained or Improved: Monitor and assess patient's chronic conditions and comorbid symptoms for stability, deterioration, or improvement     Problem: Safety - Adult  Goal: Free from fall injury  Outcome: Progressing     Problem: Safety - Medical Restraint  Goal: Remains free of injury from restraints (Restraint for Interference with Medical Device)  Description: INTERVENTIONS:  1. Determine that other, less restrictive measures have been tried or would not be effective before applying the restraint  2. Evaluate the patient's condition at the time of restraint application  3. Inform patient/family regarding the reason for restraint  4.  Q2H: Monitor safety, psychosocial status, comfort, nutrition and hydration  Outcome: Progressing  Flowsheets  Taken 6/26/2022 2000 by Jose Almanza RN  Remains free of injury from restraints (restraint for interference with medical device):   Determine that other, less restrictive measures have been tried or would not be effective before applying the restraint   Evaluate the patient's condition at the time of restraint application   Inform patient/family regarding the reason for restraint   Every 2 hours: Monitor safety, psychosocial status, comfort, nutrition and hydration  Taken 6/26/2022 1800 by David Caraballo RN  Remains free of injury from restraints (restraint for interference with medical device):   Determine that other, less restrictive measures have been tried or would not be effective before applying the restraint   Evaluate the patient's condition at the time of restraint application   Every 2 hours: Monitor safety, psychosocial status, comfort, nutrition and hydration  Taken 6/26/2022 1612 by David Caraballo RN  Remains free of injury from restraints (restraint for interference with medical device):   Determine that other, less restrictive measures have been tried or would not be effective before applying the restraint   Every 2 hours: Monitor safety, psychosocial status, comfort, nutrition and hydration  Taken 6/26/2022 1400 by David Caraballo RN  Remains free of injury from restraints (restraint for interference with medical device):   Determine that other, less restrictive measures have been tried or would not be effective before applying the restraint   Evaluate the patient's condition at the time of restraint application   Every 2 hours: Monitor safety, psychosocial status, comfort, nutrition and hydration  Taken 6/26/2022 1200 by Mara Rubin RN  Remains free of injury from restraints (restraint for interference with medical device):   Determine that other, less restrictive measures have been tried or would not be effective before applying the restraint   Evaluate the patient's condition at the time of restraint application   Inform patient/family regarding the reason for restraint   Every 2 hours: Monitor safety, psychosocial status, comfort, nutrition and hydration  Taken 6/26/2022 1000 by Mara Rubin RN  Remains free of injury from restraints (restraint for interference with medical device):   Determine that other, less restrictive measures have been tried or would not be effective before applying the restraint   Evaluate the patient's condition at the time of restraint application   Inform patient/family regarding the reason for restraint   Every 2 hours: Monitor safety, psychosocial status, comfort, nutrition and hydration     Problem: Nutrition Deficit:  Goal: Optimize nutritional status  Outcome: Progressing     Pt resting in bed, tolerating vent settings, able to answer yes/no questions, IV precedex infusing, TF infusing, archuleta and FMS patent, no s/s of distress.

## 2022-06-27 NOTE — PROGRESS NOTES
Tolerating bipap well         06/27/22 1455   NIV Type   Skin Assessment Clean, dry, & intact   Equipment Type v60   Mode Bilevel   Mask Type Full face mask   Mask Size Medium   Settings/Measurements   IPAP 16 cmH20   CPAP/EPAP 8 cmH2O   Rate Ordered 14   Insp Rise Time (%) 2 %   FiO2  35 %   I Time/ I Time % 0.9 s   Vt Exhaled 416 mL   Minute Volume 13.1 Liters   Mask Leak (lpm) 0 lpm   Comfort Level Good   Using Accessory Muscles No   SpO2 99   Breath Sounds   Right Upper Lobe Diminished   Right Middle Lobe Diminished   Right Lower Lobe Diminished   Left Upper Lobe Diminished   Left Lower Lobe Diminished   Alarm Settings   Alarms On Y   Low Pressure 8 cmH2O   High Pressure  35 cmH2O   Apnea (secs) 20 secs   RR Low  14   RR High 40 br/min

## 2022-06-27 NOTE — PROGRESS NOTES
Ps 10/5, FiO2 35%           06/27/22 0712   NICU Vent Information   Vt (Set, mL) 0 mL   Vt Exhaled 262 mL   Resp Rate (Set) 0 bmp   Rate Measured 25 br/min   Minute Volume 6.35 Liters   Peak Flow 0 L/min   Pressure Support 10 cmH20   FiO2  35 %   Peak Inspiratory Pressure 15 cmH2O   I:E Ratio 1:2.30   Sensitivity 2   High Peep/I Pressure 0   PEEP/CPAP (cmH2O) 5   I Time/ I Time % 0 s   Mean Airway Pressure 8.69 cmH20   Additional Respiratory Assessments   Heart Rate 79   Resp 22   SpO2 99 %   Vent Alarm Settings   High Pressure  40 cmH2O   Low Minute Volume Alarm 4 L/min   RR High 40 br/min

## 2022-06-27 NOTE — PROGRESS NOTES
06/26/22 2307   NICU Vent Information   Vent Type 980   Vent Mode AC/VC   Vt (Set, mL) 380 mL   Vt Exhaled 375 mL   Resp Rate (Set) 20 bmp   Rate Measured 20 br/min   Minute Volume 7.51 Liters   Peak Flow 50 L/min   Pressure Support 0 cmH20   FiO2  35 %   Peak Inspiratory Pressure 24 cmH2O   I:E Ratio 1:2.70   Sensitivity 3   High Peep/I Pressure 0   PEEP/CPAP (cmH2O) 5   I Time/ I Time % 0 s   Mean Airway Pressure 11 cmH20   Cough/Sputum   Sputum How Obtained Suctioned;Endotracheal   Sputum Amount Small   Sputum Color Creamy   Tenacity Thick   Breath Sounds   Right Upper Lobe Diminished   Right Middle Lobe Diminished   Right Lower Lobe Diminished   Left Upper Lobe Diminished   Left Lower Lobe Diminished   Additional Respiratory Assessments   Heart Rate 69   Resp 20   SpO2 97 %   Position Semi-Mccollum's   Circuit Condensation Drained   Vent Alarm Settings   High Pressure  40 cmH2O   Low Minute Volume Alarm 4 L/min   RR High 40 br/min   ETT (adult)   Placement Date/Time: 06/18/22 1650   Present on Admission/Arrival: No  Placement Verified By: Direct visualization;Colorimetric ETCO2 device  Preoxygenation: Yes  Airway Tube Size: 8 mm  Laryngoscope: GlideScope  Blade Size: 3  Location: Oral  Secured. ..    ETT Placement Left

## 2022-06-27 NOTE — PROGRESS NOTES
Reviewed pt condition with Dr Aj Estrada. Orders received to hold PO meds tonight, HR acceptable to 130's, discontinue neuro checks.

## 2022-06-27 NOTE — CARE COORDINATION
INTERDISCIPLINARY PLAN OF CARE CONFERENCE    Date/Time: 6/27/2022 9:06 AM  Completed by: JENNIFER Tang   Case Management    Patient Name:  Lebron Stanley  YOB: 1940  Admitting Diagnosis: Shortness of breath [R06.02]  Septicemia ([facility] [A41.9]  Chronic anemia [D64.9]  Acute respiratory failure with hypoxia ([facility] [J96.01]  Aspiration pneumonia of left lower lobe, unspecified aspiration pneumonia type ([facility] [J69.0]  Chronic kidney disease, unspecified CKD stage [N18.9]     Admit Date/Time:  6/17/2022 12:50 AM    Chart reviewed. Interdisciplinary team contacted or reviewed plan related to patient progress and discharge plans. Disciplines included Case Management, Nursing, and Dietitian. Current Status:ongoing   PT/OT recommendation for discharge plan of care: TBD    Expected D/C Disposition:  TBD  Confirmed plan with patient and/or family Yes confirmed with: (name) pt and pt's  at bedside    Discharge Plan Comments: Chart review completed. Completed Interdisciplinary rounds with ICU staff. Consult pending for possible Trach/Peg. Dr. Kevin Almanzar stated okay to discuss LTACH as a potential option if pt gets a Trach/Peg. Met with pt and pt's  at bedside. Pt remains on the Vent but awake (opens eyes). Discussed LTACH with pt's  and what this means. Provided him with an LTACH list (long term acute [facility]) to review. He denied current needs for CM. CM will continue to follow and assist. Please notify CM if needs or concerns arise.     Home O2 in place on admit: Yes

## 2022-06-27 NOTE — CONSULTS
Called consult spoke to answering service [facility] notified.  @[phone] on 6/27/2022

## 2022-06-27 NOTE — PROGRESS NOTES
PROGRESS NOTE  S:81 yrs Patient  admitted on 6/17/2022 with Shortness of breath [R06.02]  Septicemia ([facility] [address] [A41.9]  Chronic anemia [D64.9]  Acute respiratory failure with hypoxia (HCC) [J96.01]  Aspiration pneumonia of left lower lobe, unspecified aspiration pneumonia type ([facility] [address] [J69.0]  Chronic kidney disease, unspecified CKD stage [N18.9] . Today she remains intubated. She is passing brown BMs per Rahel Lucas. Nurse reports pulmonology is considering trach/PEG on Wednesday if patient is unable to be extubated. Exam:   Vitals:    06/27/22 1200   BP: (!) 144/51   Pulse: (!) 102   Resp: 30   Temp: 99 °F (37.2 °C)   SpO2: 98%      General appearance: appears older than stated age, cooperative, distracted, morbidly obese, pale, slowed mentation and syndromic appearance - intubated, chronically ill appearing  HEENT: Neck supple with midline trachea  Neck: no adenopathy and supple, symmetrical, trachea midline  Lungs: clear to auscultation bilaterally  Heart: regular rate and rhythm, S1, S2 normal, no murmur, click, rub or gallop  Abdomen: normal findings: bowel sounds normal, no masses palpable and symmetric and abnormal findings:  distended and obese  Extremities: edema 1+ BLE     Medications: Reviewed    Labs:  CBC:   Recent Labs     06/25/22  0554 06/26/22  0510 06/27/22  0450   WBC 9.8 10.6 12.3*   HGB 9.0* 8.8* 8.4*   HCT 27.1* 26.9* 26.2*   MCV 86.4 85.8 86.1    279 267     BMP:   Recent Labs     06/25/22  0554 06/26/22  0510 06/27/22  0450    137 137   K 3.1* 3.3* 3.7    106 105   CO2 20* 19* 19*   PHOS 1.7* 3.4 3.5   BUN 52* 54* 59*   CREATININE 2.6* 2.5* 2.7*     Attending Supervising [de-identified] Attestation Statement  The patient is a 80 y.o. female. I have performed a history and physical examination of the patient.  I discussed the case with my physician assistant Alie Donovan PA-C    I reviewed the patient's Past Medical History, Past Surgical History, Medications, and Allergies. Physical Exam:  Vitals:    06/27/22 1200 06/27/22 1300 06/27/22 1354 06/27/22 1400   BP: (!) 144/51 (!) 152/59  (!) 154/58   Pulse: (!) 102 (!) 107 (!) 114 (!) 113   Resp: 30 (!) 32 30 (!) 31   Temp: 99 °F (37.2 °C)      TempSrc: Bladder      SpO2: 98% 97% 98% 98%   Weight:       Height:           Physical Examination: General appearance - ill-appearing  Mental status - drowsy  Eyes - sclera anicteric  Neck - supple, no significant adenopathy  Chest - no tachypnea, retractions or cyanosis  Heart - normal rate and regular rhythm  Abdomen - soft, nontender, nondistended  Extremities - pedal edema 2 +          Impression: 80year old female with a history of HTN, CKD, Parkinson's, A fib, CHF, chronic anemia, and BETSY admitted with respiratory failure requiring intubation. Hospitalization c/b acute on chronic anemia and prolonged intubation. Family considering trach/PEG placement. Recommendation:  1. Continue supportive care  2. Monitor Hgb  3. Observe for signs of bleeding  4. Monitor and document output  5. Pantoprazole 40 mg BID  6. Consider IV iron supplementation daily   7. Continue TFs per OG as tolerated   8. Nephrology following   9. ENT consulted - considering trach on Thursday   10. Will follow  11. Will consider EGD with PEG on Thursday         Janelle Almonte PA-C  1:42 PM 6/27/2022                      77-year-old female with a history of HTN, HLD, DM, CKD, asthma, atrial fibrillation, carotid stenosis, chronic back pain, DDD, and hypothyroidism admitted with acute respiratory failure and aspiration pneumonia requiring mechanical ventilation extubated today.      Continue supportive care. PPI BID. Bedside swallow eval. PT/OT.  Outpatient workup for anemia with EGD+Colon once acute issues resolve    Christine Webber MD          [phone]  [employer]

## 2022-06-27 NOTE — PROGRESS NOTES
0800  Shift assessment, completed, see flow sheet. Pt is alert and following commands. Intubated and on SBT # 7.5 ETT, at 23 LL. 10 / 5. SR 74, /43 (67), SpO2 97%. Respirations are easy, even, and unlabored. Bilateral lung sounds are diminished. OG in place with TF at 35 mL/hr, 0ml residual.      PICC to LUE and PIV x1 remain patent with sites and dressings C/D/I with Precedex infusing. Zimmerman in place and patent with STAT lock. Wandalee Clarke remains in place and patent. Bilateral soft wrist restraints in place for pt safety. Call light within reach. Bed in lowest position. Bed alarm on. Will continue to monitor. [address] [facility] [address] rounds completed with Dr. Carlos Eduardo Miguel and multidisciplinary team. Reviewed labs, meds, VS, assessment, & plan of care for today. See dictated note and new orders for details. New order received:  -obtain ABG in 30 minutes (0945) and report results to Dr. Carlos Eduardo Miguel for possible extubation. Spouse is at bedside currently and is aware of condition and new order. 1229  Reassessment completed, see flowsheets, unchanged from prior. VSS. All ICU lines and monitoring remain in place. Bed locked in lowest position. Call light within reach. 1300  Approval and order for extubation in place for pt. RT is aware and will be over to extubate pt.     1345  Pt extubated by RT and placed on continuous bipap at this time and is allowed small breaks off of the bipap as needed. Pt tolerated well.     1608  Reassessment completed, see flowsheets, unchanged from prior. VSS. All ICU lines and monitoring remain in place. Pt remains on bipap continuously at this time, resting comfortably with family present at bedside. Bed locked in lowest position. Call light within reach. 1800  Zimmerman catheter removed per nurse-driven protocol and approval by nephrologist. Sue Tapia completed and purewick placed and connected to suction at this time.

## 2022-06-27 NOTE — CONSULTS
Called consult to answering service, pt has seen Dyllan Graham on 3/17/20 for a surgery.   Dr. Dyllan Graham notified for consult of PEG Placement at [facility] 6/27/2022

## 2022-06-27 NOTE — PROGRESS NOTES
RT Nebulizer Bronchodilator Protocol Note    There is a bronchodilator order in the chart from a provider indicating to follow the RT Bronchodilator Protocol and there is an Initiate RT Bronchodilator Protocol order as well (see protocol at bottom of note). CXR Findings:  XR CHEST PORTABLE    Result Date: 6/27/2022  No significant interval change in bilateral airspace disease and small effusions. Unchanged positioning of support devices. XR CHEST PORTABLE    Result Date: 6/26/2022  Increasing basilar opacities with probable small effusions. Unchanged positioning of support devices. The findings from the last RT Protocol Assessment were as follows:  Smoking: (P) Chronic pulmonary disease  Respiratory Pattern: (P) Mild dyspnea at rest, irregular pattern, or RR 21-25 bpm  Breath Sounds: (P) Slightly diminished and/or crackles  Cough: (P) Weak, productive  Indication for Bronchodilator Therapy: (P) Decreased or absent breath sounds  Bronchodilator Assessment Score: (P) 10    Aerosolized bronchodilator medication orders have been revised according to the RT Nebulizer Bronchodilator Protocol below. Respiratory Therapist to perform RT Therapy Protocol Assessment initially then follow the protocol. Repeat RT Therapy Protocol Assessment PRN for score 0-3 or on second treatment, BID, and PRN for scores above 3. No Indications - adjust the frequency to every 6 hours PRN wheezing or bronchospasm, if no treatments needed after 48 hours then discontinue using Per Protocol order mode. If indication present, adjust the RT bronchodilator orders based on the Bronchodilator Assessment Score as indicated below. If a patient is on this medication at home then do not decrease Frequency below that used at home. 0-3 - enter or revise RT bronchodilator order(s) to equivalent RT Bronchodilator order with Frequency of every 4 hours PRN for wheezing or increased work of breathing using Per Protocol order mode. 4-6 - enter or revise RT Bronchodilator order(s) to two equivalent RT bronchodilator orders with one order with BID Frequency and one order with Frequency of every 4 hours PRN wheezing or increased work of breathing using Per Protocol order mode. 7-10 - enter or revise RT Bronchodilator order(s) to two equivalent RT bronchodilator orders with one order with TID Frequency and one order with Frequency of every 4 hours PRN wheezing or increased work of breathing using Per Protocol order mode. 11-13 - enter or revise RT Bronchodilator order(s) to one equivalent RT bronchodilator order with QID Frequency and an Albuterol order with Frequency of every 4 hours PRN wheezing or increased work of breathing using Per Protocol order mode. Greater than 13 - enter or revise RT Bronchodilator order(s) to one equivalent RT bronchodilator order with every 4 hours Frequency and an Albuterol order with Frequency of every 2 hours PRN wheezing or increased work of breathing using Per Protocol order mode. RT to enter RT Home Evaluation for COPD & MDI Assessment order using Per Protocol order mode.     Electronically signed by Lori Carrion RCP on 6/27/2022 at 2:04 PM

## 2022-06-27 NOTE — PROGRESS NOTES
[facility] Progress Note           Subjective/   80y.o. year old female who we are seeing in consultation for FARHEEN/CKD.      HPI:  Patient remains intubated  On SBT, trac on hold  Received lasix on 6/25  urine output 1.4 L noted in last 24 hours  Creatinine 2.5-->2.7  Still with significant swelling    ROS:  +weak, remains on ventilator    SH: Visitor present    Objective/   GEN:  Chronically ill, BP (!) 129/50   Pulse 83   Temp 98.6 °F (37 °C) (Bladder)   Resp 29   Ht 5' 6\" (1.676 m)   Wt 264 lb 8.8 oz (120 kg)   SpO2 98%   BMI 42.70 kg/m²   HEENT: non-icteric, no JVD  CV: S1, S2 without m/r/g; ++ LE edema  RESP: Decreased breath sounds  ABD: +bs, soft, nt, no hsm  SKIN: warm, no rashes    Data/  Recent Labs     06/25/22  0554 06/26/22  0510 06/27/22  0450   WBC 9.8 10.6 12.3*   HGB 9.0* 8.8* 8.4*   HCT 27.1* 26.9* 26.2*   MCV 86.4 85.8 86.1    279 267     Recent Labs     06/25/22  0554 06/26/22  0510 06/27/22  0450    137 137   K 3.1* 3.3* 3.7    106 105   CO2 20* 19* 19*   GLUCOSE 166* 145* 155*   PHOS 1.7* 3.4 3.5   MG 1.70* 1.90 2.10   BUN 52* 54* 59*   CREATININE 2.6* 2.5* 2.7*   LABGLOM 18* 19* 17*   GFRAA 21* 22* 21*     Component      Latest Ref Rng & Units 6/22/2022 6/22/2022           4:10 PM  4:10 PM   Color, UA      Straw/Yellow Yellow    Clarity, UA      Clear Clear    Glucose, UA      Negative mg/dL Negative    Bilirubin, Urine      Negative Negative    Ketones, Urine      Negative mg/dL TRACE (A)    Specific Gravity, UA      1.005 - 1.030 1.025    Blood, Urine      Negative Negative    pH, UA      5.0 - 8.0 5.5    Protein, UA      Negative mg/dL TRACE (A)    Urobilinogen, Urine      <2.0 E.U./dL 0.2    Nitrite, Urine      Negative Negative    Leukocyte Esterase, Urine      Negative Negative    Microscopic Examination       YES    Urine Type       NotGiven    Mucus, UA      None Seen /LPF Rare (A)    WBC, UA      0 - 5 /HPF 10-20 (A)    RBC, UA      0 - 4 /HPF 3-4    Epithelial Cells, UA      0 - 5 /HPF 0-1    Bacteria, UA      None Seen /HPF 2+ (A)    Sodium, Ur      Not Established mmol/L  <20   Potassium, Ur      Not Established mmol/L  26.3   Chloride      Not Established mmol/L  <20       Assessment/     - Acute kidney injury - multi-factorial ATN injury in setting of aspiration PNA, hypotension, & elevated vancomycin levels     - Chronic kidney disease stage 3b - background DM/HTN/CHF              SCr 1.6              Follows with [facility]     - Acute hypoxemic respiratory failure/Aspiration PNA/Pulmonary edema - on ventilator     - Diastolic CHF     - Atrial fibrillation    - Anemia - prn prbc's     - Hypertension    - Hypokalemia - prn replacement    Plan/   -hold lasix today  - Monitoring off of ARB  -Serial renal panel  -daily wts and strict i/o  -renal dose medications   -avoid nephrotoxins    ____________________________________  Ashleigh Hammer MD  [facility]  [email or web address]  Office: [phone]

## 2022-06-27 NOTE — PROGRESS NOTES
06/27/22 0318   NICU Vent Information   Vent Type 980   Vent Mode AC/VC   Vt (Set, mL) 380 mL   Vt Exhaled 368 mL   Resp Rate (Set) 20 bmp   Rate Measured 20 br/min   Minute Volume 7.46 Liters   Peak Flow 50 L/min   Pressure Support 0 cmH20   FiO2  35 %   Peak Inspiratory Pressure 26 cmH2O   I:E Ratio 1:2.40   Sensitivity 3   High Peep/I Pressure 0   PEEP/CPAP (cmH2O) 5   I Time/ I Time % 0 s   Mean Airway Pressure 11 cmH20   Cough/Sputum   Sputum How Obtained Suctioned;Endotracheal   Sputum Amount Small   Sputum Color Creamy   Tenacity Thick   Breath Sounds   Right Upper Lobe Diminished   Right Middle Lobe Diminished   Right Lower Lobe Diminished   Left Upper Lobe Diminished   Left Lower Lobe Diminished   Additional Respiratory Assessments   Heart Rate 73   Resp 20   SpO2 100 %   Position Semi-Mccollum's   Vent Alarm Settings   High Pressure  40 cmH2O   Low Minute Volume Alarm 4 L/min   RR High 40 br/min   ETT (adult)   Placement Date/Time: 06/18/22 1650   Present on Admission/Arrival: No  Placement Verified By: Direct visualization;Colorimetric ETCO2 device  Preoxygenation: Yes  Airway Tube Size: 8 mm  Laryngoscope: GlideScope  Blade Size: 3  Location: Oral  Secured. ..    ETT Placement Center

## 2022-06-27 NOTE — PROGRESS NOTES
Hospitalist Progress Note      PCP: Andreina Osullivan MD    Date of Admission: 6/17/2022    Subjective: admitted for resp failure from CHF , pna, eventually intubated    Ms. Tulio Doshi remains on vent support, awake on precedex, comfortable     Family decided on trach and PEG   at bedside today     lasix gtt stopped 2/2 bump in creat, on SBT trial this am for 2 hrs    6/27/22- This is my first encounter with the patient. Chart reviewed briefly. Patient seen on the ventilator. Unable to liberate from the vent so far and plans to do a tracheostomy and PEG. ENT consulted. Family wants to wait till Wednesday to see if she will rebleed from the vent. If she does not then plans to put a tracheostomy on Thursday.       Medications:  Reviewed    Infusion Medications    dexmedetomidine HCl in NaCl 0.8 mcg/kg/hr (06/27/22 0520)    sodium chloride      sodium chloride       Scheduled Medications    iron sucrose (VENOFER) iv piggyback 100 mL (Admin over 60 minutes)  100 mg IntraVENous Q24H    pantoprazole  40 mg IntraVENous BID    ipratropium-albuterol  1 ampule Inhalation Q4H    lidocaine 1 % injection  5 mL IntraDERmal Once    chlorhexidine  15 mL Mouth/Throat BID    sodium chloride flush  10 mL IntraVENous 2 times per day    [Held by provider] apixaban  2.5 mg Oral BID    atorvastatin  40 mg Oral Nightly    carbidopa-levodopa  1 tablet Oral 4x Daily    DULoxetine  60 mg Oral Daily    budesonide-formoterol  2 puff Inhalation BID    lamoTRIgine  50 mg Oral BID    montelukast  10 mg Oral Daily    propafenone  150 mg Oral 3 times per day     PRN Meds: carboxymethylcellulose PF, sodium chloride (Inhalant), albuterol, sodium chloride, midazolam, fentanNYL, sodium chloride flush, sodium chloride, promethazine **OR** ondansetron, acetaminophen **OR** acetaminophen, albuterol      Intake/Output Summary (Last 24 hours) at 6/27/2022 1347  Last data filed at 6/27/2022 0400  Gross per 24 hour   Intake 1863.09 ml   Output 1725 ml   Net 138.09 ml       Physical Exam Performed:    BP (!) 144/51   Pulse (!) 102   Temp 99 °F (37.2 °C) (Bladder)   Resp 30   Ht 5' 6\" (1.676 m)   Wt 264 lb 8.8 oz (120 kg)   SpO2 98%   BMI 42.70 kg/m²        General Appearance:elderly female on vent support,  Intubated, awake   Oral ETT and OG noted  Appears to be not in any distress  Mucous Membranes:  Pink , anicteric  Neck: No JVD, no carotid bruit, no thyromegaly  Chest:  Clear to auscultation bilaterally, diminished in bases with scattered crackles  Cardiovascular:  RRR S1S2 heard, no murmurs or gallops  Abdomen:  Soft, obese,  undistended, non tender, no organomegaly, BS present  Extremities: diffuse wesly 2 + edema to both LE . Distal pulses well felt  Neurological :drowsy and arousable. Labs:   Recent Labs     06/25/22  0554 06/26/22  0510 06/27/22  0450   WBC 9.8 10.6 12.3*   HGB 9.0* 8.8* 8.4*   HCT 27.1* 26.9* 26.2*    279 267     Recent Labs     06/25/22  0554 06/26/22  0510 06/27/22  0450    137 137   K 3.1* 3.3* 3.7    106 105   CO2 20* 19* 19*   BUN 52* 54* 59*   CREATININE 2.6* 2.5* 2.7*   CALCIUM 6.8* 7.0* 7.8*   PHOS 1.7* 3.4 3.5     No results for input(s): AST, ALT, BILIDIR, BILITOT, ALKPHOS in the last 72 hours. No results for input(s): INR in the last 72 hours. No results for input(s): Kathyrn Belch in the last 72 hours. Urinalysis:      Lab Results   Component Value Date    NITRU Negative 06/22/2022    WBCUA 10-20 06/22/2022    BACTERIA 2+ 06/22/2022    RBCUA 3-4 06/22/2022    BLOODU Negative 06/22/2022    SPECGRAV 1.025 06/22/2022    GLUCOSEU Negative 06/22/2022       Radiology:  XR CHEST PORTABLE   Final Result   No significant interval change in bilateral airspace disease and small   effusions. Unchanged positioning of support devices. XR CHEST PORTABLE   Final Result   Increasing basilar opacities with probable small effusions.       Unchanged positioning of support devices. XR CHEST PORTABLE   Final Result   1. No significant change. XR CHEST PORTABLE   Final Result   No significant interval change in basilar predominant airspace disease and   small left greater than right pleural effusions. Pulmonary vascular   congestion. CT ABDOMEN PELVIS WO CONTRAST Additional Contrast? None   Final Result   Colonic diverticulosis without acute diverticulitis. Nonspecific rectal wall   thickening. Partially visualized pleural effusions with adjacent compressive   atelectasis. XR CHEST PORTABLE   Final Result   Again identified is bibasilar airspace disease with small pleural effusions   and mild increased right perihilar infiltrates. This may be related to   atelectasis or pneumonia. Cardiomegaly. XR CHEST PORTABLE   Final Result   Stable examination with bilateral small effusions and bibasilar atelectasis. Stable ET tube and enteric tube. XR CHEST PORTABLE   Final Result   No acute process. Bibasilar hypoaeration      Stable cardiomegaly         IR PICC WO SQ PORT/PUMP > 5 YEARS   Final Result      XR CHEST PORTABLE   Final Result   Supportive tubing projects in stable position. Partial regression of atelectasis in the right lung base. Persisting   consolidation or or atelectasis in the left lung base. XR CHEST PORTABLE   Final Result   1. Endotracheal tube terminates in appropriate position above the nirali. 2.  Enteric tube terminates in the body of the stomach. 3.  Basilar opacities again demonstrated, right greater than left. Interval   improved aeration of the left lung base. XR CHEST PORTABLE   Final Result   Increasing opacity in the right base, lobar atelectasis versus accumulating   right pleural effusion. Combination of both entities or underlying pneumonia   also possible. Persisting left basilar airspace disease and small pleural effusion.          XR CHEST PORTABLE   Final Result   Hazy bilateral lobe airspace opacities and small left pleural effusion. This   could represent atelectasis or pneumonia in the appropriate clinical setting. CT CHEST WO CONTRAST   Final Result   1. Respiratory motion in the lower lungs. The right base has linear and   dependent atelectasis. The left does have atelectatic changes but could also   have some patchy opacities from pneumonia or aspiration at the left base. 2.  There is no pleural effusion or pneumothorax. 3.  Narrowing of the AP diameter of the trachea with bulging of the   membranous portion due to the gas distended but nondilated esophagus. XR CHEST PORTABLE   Final Result   1. Bibasilar heterogeneous opacities may represent subsegmental atelectasis   and/or mild pulmonary edema. Underlying infection is difficult to exclude. Short-term follow-up PA and lateral chest radiographs may be helpful for   further evaluation. 2.  Cardiomegaly. XR CHEST PORTABLE    (Results Pending)           Assessment/Plan:    Principal Problem:    Acute respiratory failure with hypoxia (HCC)  Active Problems:    Shortness of breath    Aspiration pneumonia of left lower lobe (HCC)    Mild protein-calorie malnutrition (HCC)    Parkinson disease (HCC)    Septicemia (HCC)    Chronic kidney disease  Resolved Problems:    * No resolved hospital problems. *           Acute Hypox Resp Failure. Aspiration PNA. Oxygen requirements increased rapidly from 2l/min to NRB to vapotherm  Currently 100% 40 L on Vapotherm in addition to a nonrebreather. Might have to be intubated. Check Resp Culture. Continue IV Zosyn and vancomycin. Change Zosyn to cefepime. DC vancomycin 6/22. Finished 10 days of Vanco and cefepime  Intubated 6/18. Failed SBT, family decided for Trach and PEG. ENT consulted. They want to wait till Thursday. Continue spontaneous breathing trial       Sepsis POA - due to above. resolvd      Dysphagia. Abnormal CT  Cough with every meal intake per family report. Underlying Parkinson's. I ordered for MBS and esophagram, though these will unlikely be done till she is better clinically. Currently on tube feeds     Melena   GI bleed   drop in H/H   GI consulted   hold eliquis  IV PPI BID  Monitor h/h q 6       FARHEEN on CKD IIIB  Cr slightly  better then baseline actually on admission  Clinically does not appear fluid overloaded. DC IV fluids  Lasix 40 mg IV x2 6/21-with poor response-->lasix gtt  Worsening creatinine. Consulted nephrology. Dc lasix gtt. Hold Lasix.       Parkinson's disease  on Sinemet.       Afib Paroxysmal.   Rythmol   Hold Eliquis given bloody secretions during bronch.      HTN - BP on the low side. Nifedipine stopped due to this and also leg edema. Consider alternative when restarting.          DVT Prophylaxis: on SCD  eliquis held for bloody secretions seen during bronch and melena  Diet: Diet NPO  ADULT TUBE FEEDING; Orogastric; Peptide Based High Protein; Continuous; 20; Yes; 15; Q 4 hours; 35; 30;  Other (specify); normal saline, 30 ml every 6 hours, per Dr. Shruti Locke; Protein; one proteinex P2Go once daily  Code Status: Full Code        Tawanda Costa MD 6/27/2022 1:53 PM

## 2022-06-28 LAB
ANION GAP SERPL CALCULATED.3IONS-SCNC: 16 MMOL/L (ref 3–16)
BASE EXCESS VENOUS: -10.8 MMOL/L (ref -3–3)
BASOPHILS ABSOLUTE: 0 K/UL (ref 0–0.2)
BASOPHILS RELATIVE PERCENT: 0.1 %
BUN BLDV-MCNC: 60 MG/DL (ref 7–20)
CALCIUM SERPL-MCNC: 8.7 MG/DL (ref 8.3–10.6)
CARBOXYHEMOGLOBIN: 0.6 % (ref 0–1.5)
CHLORIDE BLD-SCNC: 107 MMOL/L (ref 99–110)
CO2: 17 MMOL/L (ref 21–32)
CREAT SERPL-MCNC: 2.5 MG/DL (ref 0.6–1.2)
EOSINOPHILS ABSOLUTE: 0.3 K/UL (ref 0–0.6)
EOSINOPHILS RELATIVE PERCENT: 2 %
FINAL REPORT: NORMAL
GFR AFRICAN AMERICAN: 22
GFR NON-AFRICAN AMERICAN: 19
GLUCOSE BLD-MCNC: 104 MG/DL (ref 70–99)
GLUCOSE BLD-MCNC: 113 MG/DL (ref 70–99)
GLUCOSE BLD-MCNC: 89 MG/DL (ref 70–99)
GLUCOSE BLD-MCNC: 94 MG/DL (ref 70–99)
GLUCOSE BLD-MCNC: 96 MG/DL (ref 70–99)
HCO3 VENOUS: 16.4 MMOL/L (ref 23–29)
HCT VFR BLD CALC: 30.3 % (ref 36–48)
HEMOGLOBIN: 9.5 G/DL (ref 12–16)
LYMPHOCYTES ABSOLUTE: 1 K/UL (ref 1–5.1)
LYMPHOCYTES RELATIVE PERCENT: 6.1 %
MAGNESIUM: 2.1 MG/DL (ref 1.8–2.4)
MCH RBC QN AUTO: 27.3 PG (ref 26–34)
MCHC RBC AUTO-ENTMCNC: 31.4 G/DL (ref 31–36)
MCV RBC AUTO: 87 FL (ref 80–100)
METHEMOGLOBIN VENOUS: 0.3 %
MONOCYTES ABSOLUTE: 1.5 K/UL (ref 0–1.3)
MONOCYTES RELATIVE PERCENT: 9.3 %
NEUTROPHILS ABSOLUTE: 13.2 K/UL (ref 1.7–7.7)
NEUTROPHILS RELATIVE PERCENT: 82.5 %
O2 SAT, VEN: 90 %
O2 THERAPY: ABNORMAL
PCO2, VEN: 41.2 MMHG (ref 40–50)
PDW BLD-RTO: 14.6 % (ref 12.4–15.4)
PERFORMED ON: ABNORMAL
PERFORMED ON: NORMAL
PH VENOUS: 7.22 (ref 7.35–7.45)
PHOSPHORUS: 3.8 MG/DL (ref 2.5–4.9)
PLATELET # BLD: 353 K/UL (ref 135–450)
PMV BLD AUTO: 8.1 FL (ref 5–10.5)
PO2, VEN: 68 MMHG (ref 25–40)
POTASSIUM SERPL-SCNC: 3.9 MMOL/L (ref 3.5–5.1)
PRELIMINARY: NORMAL
RBC # BLD: 3.49 M/UL (ref 4–5.2)
SODIUM BLD-SCNC: 140 MMOL/L (ref 136–145)
TCO2 CALC VENOUS: 18 MMOL/L
WBC # BLD: 15.9 K/UL (ref 4–11)

## 2022-06-28 PROCEDURE — C9113 INJ PANTOPRAZOLE SODIUM, VIA: HCPCS | Performed by: INTERNAL MEDICINE

## 2022-06-28 PROCEDURE — 94660 CPAP INITIATION&MGMT: CPT

## 2022-06-28 PROCEDURE — 2700000000 HC OXYGEN THERAPY PER DAY

## 2022-06-28 PROCEDURE — 6360000002 HC RX W HCPCS: Performed by: INTERNAL MEDICINE

## 2022-06-28 PROCEDURE — 97530 THERAPEUTIC ACTIVITIES: CPT

## 2022-06-28 PROCEDURE — 85025 COMPLETE CBC W/AUTO DIFF WBC: CPT

## 2022-06-28 PROCEDURE — 97110 THERAPEUTIC EXERCISES: CPT

## 2022-06-28 PROCEDURE — 80048 BASIC METABOLIC PNL TOTAL CA: CPT

## 2022-06-28 PROCEDURE — 94761 N-INVAS EAR/PLS OXIMETRY MLT: CPT

## 2022-06-28 PROCEDURE — 97162 PT EVAL MOD COMPLEX 30 MIN: CPT

## 2022-06-28 PROCEDURE — 99291 CRITICAL CARE FIRST HOUR: CPT | Performed by: INTERNAL MEDICINE

## 2022-06-28 PROCEDURE — 31720 CLEARANCE OF AIRWAYS: CPT

## 2022-06-28 PROCEDURE — 2000000000 HC ICU R&B

## 2022-06-28 PROCEDURE — 97166 OT EVAL MOD COMPLEX 45 MIN: CPT

## 2022-06-28 PROCEDURE — 82803 BLOOD GASES ANY COMBINATION: CPT

## 2022-06-28 PROCEDURE — 6370000000 HC RX 637 (ALT 250 FOR IP): Performed by: INTERNAL MEDICINE

## 2022-06-28 PROCEDURE — 92526 ORAL FUNCTION THERAPY: CPT

## 2022-06-28 PROCEDURE — 2580000003 HC RX 258: Performed by: INTERNAL MEDICINE

## 2022-06-28 PROCEDURE — 99233 SBSQ HOSP IP/OBS HIGH 50: CPT | Performed by: INTERNAL MEDICINE

## 2022-06-28 PROCEDURE — 84100 ASSAY OF PHOSPHORUS: CPT

## 2022-06-28 PROCEDURE — 94640 AIRWAY INHALATION TREATMENT: CPT

## 2022-06-28 PROCEDURE — 83735 ASSAY OF MAGNESIUM: CPT

## 2022-06-28 PROCEDURE — 2500000003 HC RX 250 WO HCPCS: Performed by: INTERNAL MEDICINE

## 2022-06-28 RX ORDER — FUROSEMIDE 10 MG/ML
40 INJECTION INTRAMUSCULAR; INTRAVENOUS ONCE
Status: COMPLETED | OUTPATIENT
Start: 2022-06-28 | End: 2022-06-28

## 2022-06-28 RX ADMIN — DULOXETINE HYDROCHLORIDE 60 MG: 60 CAPSULE, DELAYED RELEASE ORAL at 08:16

## 2022-06-28 RX ADMIN — SODIUM CHLORIDE, PRESERVATIVE FREE 10 ML: 5 INJECTION INTRAVENOUS at 19:59

## 2022-06-28 RX ADMIN — FUROSEMIDE 40 MG: 10 INJECTION, SOLUTION INTRAMUSCULAR; INTRAVENOUS at 11:43

## 2022-06-28 RX ADMIN — MONTELUKAST SODIUM 10 MG: 10 TABLET, COATED ORAL at 08:16

## 2022-06-28 RX ADMIN — PANTOPRAZOLE SODIUM 40 MG: 40 INJECTION, POWDER, LYOPHILIZED, FOR SOLUTION INTRAVENOUS at 20:01

## 2022-06-28 RX ADMIN — PANTOPRAZOLE SODIUM 40 MG: 40 INJECTION, POWDER, LYOPHILIZED, FOR SOLUTION INTRAVENOUS at 08:16

## 2022-06-28 RX ADMIN — PROPAFENONE HYDROCHLORIDE 150 MG: 150 TABLET, FILM COATED ORAL at 13:23

## 2022-06-28 RX ADMIN — IPRATROPIUM BROMIDE AND ALBUTEROL SULFATE 1 AMPULE: 2.5; .5 SOLUTION RESPIRATORY (INHALATION) at 07:36

## 2022-06-28 RX ADMIN — PROPAFENONE HYDROCHLORIDE 150 MG: 150 TABLET, FILM COATED ORAL at 05:01

## 2022-06-28 RX ADMIN — CARBIDOPA AND LEVODOPA 1 TABLET: 25; 100 TABLET ORAL at 16:48

## 2022-06-28 RX ADMIN — Medication 15 ML: at 20:01

## 2022-06-28 RX ADMIN — APIXABAN 2.5 MG: 5 TABLET, FILM COATED ORAL at 20:02

## 2022-06-28 RX ADMIN — SODIUM BICARBONATE 50 MEQ: 84 INJECTION INTRAVENOUS at 08:17

## 2022-06-28 RX ADMIN — IPRATROPIUM BROMIDE AND ALBUTEROL SULFATE 1 AMPULE: 2.5; .5 SOLUTION RESPIRATORY (INHALATION) at 15:40

## 2022-06-28 RX ADMIN — IPRATROPIUM BROMIDE AND ALBUTEROL SULFATE 1 AMPULE: 2.5; .5 SOLUTION RESPIRATORY (INHALATION) at 11:35

## 2022-06-28 RX ADMIN — SODIUM CHLORIDE, PRESERVATIVE FREE 10 ML: 5 INJECTION INTRAVENOUS at 08:16

## 2022-06-28 RX ADMIN — ATORVASTATIN CALCIUM 40 MG: 40 TABLET, FILM COATED ORAL at 20:04

## 2022-06-28 RX ADMIN — CARBIDOPA AND LEVODOPA 1 TABLET: 25; 100 TABLET ORAL at 20:01

## 2022-06-28 RX ADMIN — IPRATROPIUM BROMIDE AND ALBUTEROL SULFATE 1 AMPULE: 2.5; .5 SOLUTION RESPIRATORY (INHALATION) at 19:07

## 2022-06-28 RX ADMIN — LAMOTRIGINE 50 MG: 25 TABLET ORAL at 20:04

## 2022-06-28 RX ADMIN — CARBIDOPA AND LEVODOPA 1 TABLET: 25; 100 TABLET ORAL at 13:23

## 2022-06-28 RX ADMIN — LAMOTRIGINE 50 MG: 25 TABLET ORAL at 08:18

## 2022-06-28 RX ADMIN — IPRATROPIUM BROMIDE AND ALBUTEROL SULFATE 1 AMPULE: 2.5; .5 SOLUTION RESPIRATORY (INHALATION) at 22:55

## 2022-06-28 RX ADMIN — IRON SUCROSE 100 MG: 20 INJECTION, SOLUTION INTRAVENOUS at 09:09

## 2022-06-28 RX ADMIN — CARBIDOPA AND LEVODOPA 1 TABLET: 25; 100 TABLET ORAL at 08:16

## 2022-06-28 RX ADMIN — PROPAFENONE HYDROCHLORIDE 150 MG: 150 TABLET, FILM COATED ORAL at 20:04

## 2022-06-28 NOTE — PROGRESS NOTES
1418  Shift assessment, completed, see flow sheet. Pt is alert and oriented x4. Following commands. , /66 (86), SpO2 100% on 4L via HFNC. Respirations are easy, even, and unlabored. Bilateral lung sounds are noted with rhonchi with diminished bases. Took PO meds crushed this AM w/o difficulty. PICC to LUE and PIV x1 remain patent with sites and dressings C/D/I, lumens currently NS locked and capped. Zilphia Macadam in place and patent. Lendon Manner remains in place and patent. Call light within reach. Bed in lowest position. Bed alarm on. Will continue to monitor. [address] rounds completed with Dr. Mauro Norton and multidisciplinary team. Reviewed labs, meds, VS, assessment, & plan of care for today. See dictated note and new orders for details. New orders received:  -obtain VBG in AM.  -consult with GI on resuming eliquis. -NT suction PRN. 1219  Reassessment completed, see flowsheets, unchanged from prior. VSS. All ICU lines and monitoring remain in place. Bed locked in lowest position. Call light within reach. 56  Consulted with SIS Miller with [facility], \"ok\" to restart pt on previous Eliquis dose at this time. [id number]  Reassessment completed, see flowsheets, unchanged from prior. VSS. All ICU lines and monitoring remain in place. Bed locked in lowest position. Call light within reach.

## 2022-06-28 NOTE — PROGRESS NOTES
Hospitalist Progress Note      PCP: Jyotsna Jackson MD    Date of Admission: 6/17/2022    Subjective: admitted for resp failure from CHF , pna, eventually intubated    Ms. Danielle Cruz remains on vent support, awake on precedex, comfortable     Family decided on trach and PEG   at bedside today     lasix gtt stopped 2/2 bump in creat, on SBT trial this am for 2 hrs    6/27/22- This is my first encounter with the patient. Chart reviewed briefly. Patient seen on the ventilator. Unable to liberate from the vent so far and plans to do a tracheostomy and PEG. ENT consulted. Family wants to wait till Wednesday to see if she will rebleed from the vent. If she does not then plans to put a tracheostomy on Thursday. 6/28-patient was extubated at 1:57 PM on 6/27/2022. She was then placed on BiPAP. She is getting periodic breaks from BiPAP. This morning she is on BiPAP and able to follow commands. Speech therapy is going to see the patient.       Medications:  Reviewed    Infusion Medications    dexmedetomidine HCl in NaCl Stopped (06/27/22 1025)    sodium chloride      sodium chloride       Scheduled Medications    ipratropium-albuterol  1 ampule Inhalation Q4H WA    iron sucrose (VENOFER) iv piggyback 100 mL (Admin over 60 minutes)  100 mg IntraVENous Q24H    pantoprazole  40 mg IntraVENous BID    lidocaine 1 % injection  5 mL IntraDERmal Once    chlorhexidine  15 mL Mouth/Throat BID    sodium chloride flush  10 mL IntraVENous 2 times per day    [Held by provider] apixaban  2.5 mg Oral BID    atorvastatin  40 mg Oral Nightly    carbidopa-levodopa  1 tablet Oral 4x Daily    DULoxetine  60 mg Oral Daily    budesonide-formoterol  2 puff Inhalation BID    lamoTRIgine  50 mg Oral BID    montelukast  10 mg Oral Daily    propafenone  150 mg Oral 3 times per day     PRN Meds: carboxymethylcellulose PF, sodium chloride (Inhalant), sodium chloride, sodium chloride flush, sodium chloride, significant interval change in bilateral airspace disease and small   effusions. Unchanged positioning of support devices. XR CHEST PORTABLE   Final Result   Increasing basilar opacities with probable small effusions. Unchanged positioning of support devices. XR CHEST PORTABLE   Final Result   1. No significant change. XR CHEST PORTABLE   Final Result   No significant interval change in basilar predominant airspace disease and   small left greater than right pleural effusions. Pulmonary vascular   congestion. CT ABDOMEN PELVIS WO CONTRAST Additional Contrast? None   Final Result   Colonic diverticulosis without acute diverticulitis. Nonspecific rectal wall   thickening. Partially visualized pleural effusions with adjacent compressive   atelectasis. XR CHEST PORTABLE   Final Result   Again identified is bibasilar airspace disease with small pleural effusions   and mild increased right perihilar infiltrates. This may be related to   atelectasis or pneumonia. Cardiomegaly. XR CHEST PORTABLE   Final Result   Stable examination with bilateral small effusions and bibasilar atelectasis. Stable ET tube and enteric tube. XR CHEST PORTABLE   Final Result   No acute process. Bibasilar hypoaeration      Stable cardiomegaly         IR PICC WO SQ PORT/PUMP > 5 YEARS   Final Result      XR CHEST PORTABLE   Final Result   Supportive tubing projects in stable position. Partial regression of atelectasis in the right lung base. Persisting   consolidation or or atelectasis in the left lung base. XR CHEST PORTABLE   Final Result   1. Endotracheal tube terminates in appropriate position above the nirali. 2.  Enteric tube terminates in the body of the stomach. 3.  Basilar opacities again demonstrated, right greater than left. Interval   improved aeration of the left lung base.          XR CHEST PORTABLE   Final Result   Increasing opacity in the right base, lobar atelectasis versus accumulating   right pleural effusion. Combination of both entities or underlying pneumonia   also possible. Persisting left basilar airspace disease and small pleural effusion. XR CHEST PORTABLE   Final Result   Hazy bilateral lobe airspace opacities and small left pleural effusion. This   could represent atelectasis or pneumonia in the appropriate clinical setting. CT CHEST WO CONTRAST   Final Result   1. Respiratory motion in the lower lungs. The right base has linear and   dependent atelectasis. The left does have atelectatic changes but could also   have some patchy opacities from pneumonia or aspiration at the left base. 2.  There is no pleural effusion or pneumothorax. 3.  Narrowing of the AP diameter of the trachea with bulging of the   membranous portion due to the gas distended but nondilated esophagus. XR CHEST PORTABLE   Final Result   1. Bibasilar heterogeneous opacities may represent subsegmental atelectasis   and/or mild pulmonary edema. Underlying infection is difficult to exclude. Short-term follow-up PA and lateral chest radiographs may be helpful for   further evaluation. 2.  Cardiomegaly. Assessment/Plan:    Principal Problem:    Acute respiratory failure with hypoxia (HCC)  Active Problems:    Shortness of breath    Aspiration pneumonia of left lower lobe (HCC)    Mild protein-calorie malnutrition (HCC)    Chronic anemia    Parkinson disease (HCC)    Septicemia (HCC)    Chronic kidney disease  Resolved Problems:    * No resolved hospital problems. *           Acute Hypox Resp Failure. Aspiration PNA. Oxygen requirements increased rapidly from 2l/min to NRB to vapotherm  Currently 100% 40 L on Vapotherm in addition to a nonrebreather. Might have to be intubated. Check Resp Culture. Continue IV Zosyn and vancomycin. Change Zosyn to cefepime. JORY vancomycin 6/22.   Finished 10 days of Vanco and cefepime  Intubated 6/18. Extubated on 6/27/2022.  -On BiPAP. Alternate BiPAP with nasal cannula oxygen. Speech therapy to see patient.  -She has mild metabolic acidosis.       Sepsis POA - due to above. resolvd      Dysphagia. Abnormal CT  Cough with every meal intake per family report. Underlying Parkinson's. I ordered for MBS and esophagram, though these will unlikely be done till she is better clinically. Currently on tube feeds     Melena   GI bleed   drop in H/H   GI consulted   hold eliquis  IV PPI BID  Monitor h/h q 6. H&H stable.      FARHEEN on CKD IIIB  Cr slightly  better then baseline actually on admission  Clinically does not appear fluid overloaded. DC IV fluids  Lasix 40 mg IV x2 6/21-with poor response-->lasix gtt  Worsening creatinine. Consulted nephrology. Dc lasix gtt. Hold Lasix. She has mild metabolic acidosis. She will receive amp of bicarb.       Parkinson's disease  on Sinemet.       Afib Paroxysmal.   Rythmol   Hold Eliquis given bloody secretions during bronch.      HTN - BP on the low side. Nifedipine stopped due to this and also leg edema.    Consider alternative when restarting.          DVT Prophylaxis: on SCD  eliquis held for bloody secretions seen during bronch and melena  Diet: Diet NPO  Code Status: Full Code        Bethany Bocanegra MD 6/28/2022 8:56 AM

## 2022-06-28 NOTE — PROGRESS NOTES
Inpatient Occupational Therapy  Evaluation and Treatment    Unit: ICU  Date:  6/28/2022  Patient Name:    Gaviota Layton  Admitting diagnosis:  Shortness of breath [R06.02]  Septicemia ([facility] [A41.9]  Chronic anemia [D64.9]  Acute respiratory failure with hypoxia ([facility] [address] [J96.01]  Aspiration pneumonia of left lower lobe, unspecified aspiration pneumonia type ([facility] [facility] [J69.0]  Chronic kidney disease, unspecified CKD stage [N18.9]  Admit Date:  6/17/2022  Precautions/Restrictions/WB Status/ Lines/ Wounds/ Oxygen: Fall risk, Lines -IV, Supplemental O2 (2L), Zimmerman catheter and rectal tube, Telemetry, Continuous pulse oximetry and Isolation Precautions: Contact Blind left eye, Parkinson's on Sinemet. Pt transferred to ICU on 6-17-22 - Worsening shortness of breath and respiratory distress    PT intubated on 6-18-22 6-27 bipap, 6-28     Treatment Time: 7474-1699  Treatment Number: 1   Timed code treatment minutes 45 minutes   Total Treatment minutes:   55   minutes    Patient Goals for Therapy:  \" get up out of bed  \"      Discharge Recommendations: SNF  DME needs for discharge: defer to facility       Therapy recommendations for staff:   Assist of 2 for bed mobility     History of Present Illness: per H&P on 6-17-22    80 y.o. female with a past medical history of atrial fibrillation on Eliquis, CKD, Parkinson's disease, diabetes, and sleep apnea who presents to the ED complaining of shortness of breath. The patient states that she has been short of breath since yesterday. She describes a cough, states that her throat hurts from coughing. She denies any sputum production. She denies fever body aches or chills. She does state that she has leg swelling however her  states it is chronic in nature and not worse than usual.  She denies chest pain. She does live at home with her . He states she does have frequent aspiration when eating.     history of a spinal fusion and a carotid endarterectomy   [facility] S4 Level Recommendation:  NA  AM-PAC Score: 6    Preadmission Environment    Pt. Lives with spouse  Home environment:  one story home  Steps to enter first floor: ramp  Steps to second floor: N/A  Bathroom: walk in shower, shower seat , grab bars, raised commode   Equipment owned: RW, wc (manual), BSC and Bi pap, HHN, electric W?C     Preadmission Status:  Pt. Able to drive: No  Pt Fully independent with ADLs: No  assists   Pt. Required assistance from family for: Cleaning, Cooking and Laundry   Pt. independent for transfers and gait and walked with Rollator  History of falls No  Sleeps in a recliner, Therapy 1x a week    Pain  No      Cognition    A&O Person, Place and Time -pt stated it was the middle of June 2022   Able to follow 2 step commands    Subjective  Patient lying supine in bed with no family present. Pt agreeable to this OT eval & tx. Upper Extremity ROM:    Limited AROM bilaterally  Pt reports she is rt handed     Upper Extremity Strength:    Shoulder flex 2-5   Elbow flex/ext 3/5  Wrist flex/ext 3-5  Good  bilaterally  Upper Extremity Sensation    WFL    Upper Extremity Proprioception:  NT    Coordination and Tone  Diminished    Balance  Functional Sitting Balance:  NT  Functional Standing Balance:NT    Bed mobility:    Supine to sit:   Not Tested  Sit to supine:   Not Tested  Rolling:    Not Tested  Scooting in sitting:  Not Tested  Scooting to head of bed:   Max assist of two     Bridging:   Not Tested    Transfers:    Sit to stand:  Not Tested  Stand to sit:  Not Tested  Bed to chair:   Not Tested  Standard toilet: Not Tested  Bed to [facility]:  Not Tested    Dressing:      UE:   Not Tested  LE:    Max    Bathing:    UE:  Not Tested  LE:  Max A     Eating:   Not Tested    Toileting:   Max A     Activity Tolerance   Pt completed therapy session with No adverse symptoms noted w/activity  /53 Sp02 99   On 2 liters   Positioning Needs:   PT in bed     Exercise / Activities Initiated:   Hand flex/ext:  x10  Wrist flex/ext:  x10  Elbow flex/ext  Shoulder flex/ext:  x5  Shoulder shrugs:  x5    Patient/Family Education:   Role of OT    Assessment of Deficits: Pt seen for Occupational therapy evaluation in acute care setting. Pt demonstrated decreased Activity tolerance, ADLs, Balance , Bed mobility, ROM, Strength and Transfers. Pt functioning below baseline and will likely benefit from skilled occupational therapy services to maximize safety and independence. Goal(s) : To be met in 3 Visits:  1). Bed to toilet/BSC: Mod A  and 2 persons with appropriate AD     To be met in 5 Visits:  1). Supine to/from Sit:  Mod A  and 2 persons  2). Upper Body Bathing:   Min A   3). Lower Body Bathing: Mod A   4). Upper Body Dressing:  Min A   5). Lower Body Dressing:  Max A   6). Pt to demonstrate UE exs x 15 reps with minimal cues    Rehabilitation Potential:  Fair for goals listed above. Strengths for achieving goals include: Pt cooperative  Barriers to achieving goals include:  Complexity of condition     Plan: To be seen 3-5 x/wk while in acute care setting for therapeutic exercises, bed mobility, transfers, dressing, bathing, family/patient education, ADL/IADL retraining, energy conservation training.      Janki Lu OTR/L [id number]          If patient discharges from this facility prior to next visit, this note will serve as the Discharge Summary

## 2022-06-28 NOTE — PROGRESS NOTES
Pulmonary & Critical Care Medicine ICU Progress Note    CC: shortness of breath     Events of Last 24 hours:   Extubated to bipap yesterday. Worse metabolic acidosis today is aggravating tachypnea. Off of Bipap for several hours today    Vascular lines: IV:   6/19/2022 PICC    MV: 6/18/2022 -6/27/22  Vent Mode: AC/VC Resp Rate (Set): 0 bmp/Vt (Set, mL): 0 mL/ /FiO2 : 30 %  Recent Labs     06/27/22  0457 06/27/22  1150   PHART 7.318* 7.324*   [id number] 36.8 36.4   PO2ART 81.7 81.5       IV:   dexmedetomidine HCl in NaCl Stopped (06/27/22 1025)    sodium chloride      sodium chloride         Vitals:  Blood pressure (!) 142/57, pulse (!) 118, temperature 99.2 °F (37.3 °C), temperature source Axillary, resp. rate (!) 34, height 5' 6\" (1.676 m), weight 264 lb 8.8 oz (120 kg), SpO2 100 %, not currently breastfeeding. On Bipap  BP (!) 142/57   Pulse (!) 118   Temp 99.2 °F (37.3 °C) (Axillary)   Resp (!) 34   Ht 5' 6\" (1.676 m)   Wt 264 lb 8.8 oz (120 kg)   SpO2 100%   BMI 42.70 kg/m²    Constitutional:  In no distress. Eyes: PERRL. Conjunctivae anicteric. ENT: Normal nose. Normal tongue. Neck:  Trachea is midline. No thyroid tenderness. Respiratory: + accessory muscle usage. decreased breath sounds. No wheezes. No rales. No Rhonchi. Cardiovascular: Normal S1S2. No digit clubbing. No digit cyanosis. +  LE edema. Gastrointestinal: No mass palpated. No tenderness palpated. No umbilical hernia. Lymphatic: No cervical or supraclavicular adenopathy. Skin: No rash on the exposed extremities. No Nodules or induration on exposed extremities. Psychiatric: No anxiety or Agitation. Alert and Oriented to person, place and time.       Scheduled Meds:   sodium bicarbonate  50 mEq IntraVENous Once    ipratropium-albuterol  1 ampule Inhalation Q4H WA    iron sucrose (VENOFER) iv piggyback 100 mL (Admin over 60 minutes)  100 mg IntraVENous Q24H    pantoprazole  40 mg IntraVENous BID    lidocaine 1 % injection 5 mL IntraDERmal Once    chlorhexidine  15 mL Mouth/Throat BID    sodium chloride flush  10 mL IntraVENous 2 times per day    [Held by provider] apixaban  2.5 mg Oral BID    atorvastatin  40 mg Oral Nightly    carbidopa-levodopa  1 tablet Oral 4x Daily    DULoxetine  60 mg Oral Daily    budesonide-formoterol  2 puff Inhalation BID    lamoTRIgine  50 mg Oral BID    montelukast  10 mg Oral Daily    propafenone  150 mg Oral 3 times per day       Data:  CBC:   Recent Labs     06/26/22  0510 06/27/22  0450 06/28/22  0448   WBC 10.6 12.3* 15.9*   HGB 8.8* 8.4* 9.5*   HCT 26.9* 26.2* 30.3*   MCV 85.8 86.1 87.0    267 353     BMP:   Recent Labs     06/26/22  0510 06/27/22  0450 06/28/22  0448    137 140   K 3.3* 3.7 3.9    105 107   CO2 19* 19* 17*   PHOS 3.4 3.5 3.8   BUN 54* 59* 60*   CREATININE 2.5* 2.7* 2.5*     LIVER PROFILE:   No results for input(s): AST, ALT, LIPASE, BILIDIR, BILITOT, ALKPHOS in the last 72 hours. Invalid input(s): AMYLASE,  ALB    Microbiology:  6/17/2022 SARS-CoV-2 and influenza are negative  6/17/2022 MRSA DNA screen positive  6/17/2022 blood NG  6/17/2022 sputum NRF  6/18/2022 tracheal aspirate NRF  6/19/2022 BAL Candida  6/19/2022 respiratory viral panel negative    Imaging:  CXR 6/27/2022   No significant interval change in bilateral airspace disease and small   effusions.  Unchanged positioning of support devices.        ACT 6/23/22   Small right and very small effusions, diverticulosis, nonspecific rectal wall thickening      ASSESSMENT:  · Acute hypoxemic respiratory failure  · Aspiration pneumonia  · BETSY on BiPAP 22/15 with 1 L bleed in   · Metabolic acidosis likely due to FARHEEN  · Parkinson's disease  · Atrial fibrillation  · Acute on chronic kidney failure, baseline creatinine 1.6  · Acute on chronic anemia, s/p 2 U PRBC 6/23/2022  · H/O esophagitis  · Pleural effusions are small bilaterally    PLAN:  Bipap for life threatening respiratory failure  · Daily spontaneous breathing trial  · Completed  Antibiotic day #10/10, cefepime & vancomycin  · Inhaled bronchodilators  · Nephrology following   · Bicarb x 1 today  · SLP/PT/OT  · Restart Eliquis if ok with GI  · Protonix twice daily, Eliquis is held, GI has seen  · Lasix drip is now off, nephrology following  · Prophylaxis: SCD, Bactroban, Protonix BID  · Plan trach/PEG if reintubated  · Total critical care time caring for this patient with life threatening, unstable organ failure, including direct patient contact, management of life support systems, review of data including imaging and labs, discussions with other team members and physicians is 32 minutes so far today, excluding procedures.

## 2022-06-28 NOTE — PROGRESS NOTES
06/28/22 1139   Cough/Sputum   Sputum How Obtained Suctioned;Nasal tracheal   $Obtained Sample $Nasotracheal Suction   Cough Productive   Sputum Amount Large   Sputum Color Creamy   Tenacity Thick

## 2022-06-28 NOTE — PROGRESS NOTES
RT Inhaler-Nebulizer Bronchodilator Protocol Note    There is a bronchodilator order in the chart from a provider indicating to follow the RT Bronchodilator Protocol and there is an Initiate RT Inhaler-Nebulizer Bronchodilator Protocol order as well (see protocol at bottom of note). CXR Findings:  XR CHEST PORTABLE    Result Date: 6/27/2022  No significant interval change in bilateral airspace disease and small effusions. Unchanged positioning of support devices. The findings from the last RT Protocol Assessment were as follows:   History Pulmonary Disease: (P) Chronic pulmonary disease  Respiratory Pattern: (P) Mild dyspnea at rest, irregular pattern, or RR 21-25 bpm  Breath Sounds: (P) Slightly diminished and/or crackles  Cough: (P) Weak, productive  Indication for Bronchodilator Therapy: (P) On home bronchodilators  Bronchodilator Assessment Score: (P) 10    Aerosolized bronchodilator medication orders have been revised according to the RT Inhaler-Nebulizer Bronchodilator Protocol below. Respiratory Therapist to perform RT Therapy Protocol Assessment initially then follow the protocol. Repeat RT Therapy Protocol Assessment PRN for score 0-3 or on second treatment, BID, and PRN for scores above 3. No Indications - adjust the frequency to every 6 hours PRN wheezing or bronchospasm, if no treatments needed after 48 hours then discontinue using Per Protocol order mode. If indication present, adjust the RT bronchodilator orders based on the Bronchodilator Assessment Score as indicated below. Use Inhaler orders unless patient has one or more of the following: on home nebulizer, not able to hold breath for 10 seconds, is not alert and oriented, cannot activate and use MDI correctly, or respiratory rate 25 breaths per minute or more, then use the equivalent nebulizer order(s) with same Frequency and PRN reasons based on the score.   If a patient is on this medication at home then do not decrease Frequency below that used at home. 0-3 - enter or revise RT bronchodilator order(s) to equivalent RT Bronchodilator order with Frequency of every 4 hours PRN for wheezing or increased work of breathing using Per Protocol order mode. 4-6 - enter or revise RT Bronchodilator order(s) to two equivalent RT bronchodilator orders with one order with BID Frequency and one order with Frequency of every 4 hours PRN wheezing or increased work of breathing using Per Protocol order mode. 7-10 - enter or revise RT Bronchodilator order(s) to two equivalent RT bronchodilator orders with one order with TID Frequency and one order with Frequency of every 4 hours PRN wheezing or increased work of breathing using Per Protocol order mode. 11-13 - enter or revise RT Bronchodilator order(s) to one equivalent RT bronchodilator order with QID Frequency and an Albuterol order with Frequency of every 4 hours PRN wheezing or increased work of breathing using Per Protocol order mode. Greater than 13 - enter or revise RT Bronchodilator order(s) to one equivalent RT bronchodilator order with every 4 hours Frequency and an Albuterol order with Frequency of every 2 hours PRN wheezing or increased work of breathing using Per Protocol order mode.          Electronically signed by Darlene Woods RCP on 6/28/2022 at 2:32 PM

## 2022-06-28 NOTE — PROGRESS NOTES
PROGRESS NOTE  S:81 yrs Patient  admitted on 6/17/2022 with Shortness of breath [R06.02]  Septicemia ([facility] [address] [A41.9]  Chronic anemia [D64.9]  Acute respiratory failure with hypoxia (HCC) [J96.01]  Aspiration pneumonia of left lower lobe, unspecified aspiration pneumonia type ([facility] [address] [J69.0]  Chronic kidney disease, unspecified CKD stage [N18.9] . Today she complains of fatigue. She is on 4L NC O2. She is passing brown BMs per [employer].     Exam:   Vitals:    06/28/22 0900   BP: 137/80   Pulse: (!) 119   Resp: (!) 33   Temp:    SpO2: 100%      General appearance: alert, appears stated age, cooperative, fatigued, morbidly obese and syndromic appearance - chronically ill appearing  HEENT: Neck supple with midline trachea  Neck: no adenopathy and supple, symmetrical, trachea midline  Lungs: clear to auscultation bilaterally  Heart: regular rate and rhythm, S1, S2 normal, no murmur, click, rub or gallop  Abdomen: soft, non-tender; bowel sounds normal; no masses,  no organomegaly  Extremities: edema 1+ BLE     Medications: Reviewed    Labs:  CBC:   Recent Labs     06/26/22  0510 06/27/22  0450 06/28/22  0448   WBC 10.6 12.3* 15.9*   HGB 8.8* 8.4* 9.5*   HCT 26.9* 26.2* 30.3*   MCV 85.8 86.1 87.0    267 353     BMP:   Recent Labs     06/26/22  0510 06/27/22  0450 06/28/22  0448    137 140   K 3.3* 3.7 3.9    105 107   CO2 19* 19* 17*   PHOS 3.4 3.5 3.8   BUN 54* 59* 60*   CREATININE 2.5* 2.7* 2.5*     Attending Supervising [de-identified] Attestation Statement  The patient is a 80 y.o. female. I have performed a history and physical examination of the patient. I discussed the case with my physician assistant Reina John PA-C    I reviewed the patient's Past Medical History, Past Surgical History, Medications, and Allergies.      Physical Exam:  Vitals:    06/28/22 1300 06/28/22 1400 06/28/22 1500 06/28/22 1542   BP: (!) 143/52  (!) 135/56    Pulse: (!) 118 (!) 120 (!) 113 (!) 114   Resp: 23 23 26 30   Temp:       TempSrc:       SpO2: 99% 99% 100% 98%   Weight:       Height:           Physical Examination: General appearance - ill-appearing  Mental status - alert, oriented to person, place, and time  Eyes - sclera anicteric  Neck - supple, no significant adenopathy  Chest - no tachypnea, retractions or cyanosis  Heart - normal rate and regular rhythm  Abdomen - soft, nontender, nondistended  Extremities - pedal edema 2 +       Impression: 80year old female with a history of HTN, CKD, Parkinson's, A fib, CHF, chronic anemia, and BETSY admitted with respiratory failure requiring intubation. Hospitalization c/b acute on chronic anemia and prolonged intubation s/p extubation on 6/27. Recommendation:  1. Continue supportive care  2. Monitor Hgb  3. Observe for signs of bleeding  4. Monitor and document output  5. Continue Pantoprazole 40 mg BID  6. Continue iron supplementation daily   7. Ok to resume Eliquis   8. Advance diet as tolerated per SLP recommendations  9. Encourage PT/OT   10. Will sign off  11. Call with questions  12. Recommend EGD and colonoscopy as outpatient for anemia      Jocelynn Montaño PA-C  11:32 AM 6/28/2022                      43-year-old female with a history of HTN, HLD, DM, CKD, asthma, atrial fibrillation, carotid stenosis, chronic back pain, DDD, and hypothyroidism admitted with acute respiratory failure and aspiration pneumonia requiring mechanical ventilation extubated today.      Continue supportive care. PPI BID. Bedside swallow eval. PT/OT. Ok to start anticoagulation. Outpatient workup for anemia with EGD+Colon once acute issues resolve. Will sign off. Call with questions.     Jarad Thomas MD          [phone]  [phone]

## 2022-06-28 NOTE — PROGRESS NOTES
RT Inhaler-Nebulizer Bronchodilator Protocol Note    There is a bronchodilator order in the chart from a provider indicating to follow the RT Bronchodilator Protocol and there is an Initiate RT Inhaler-Nebulizer Bronchodilator Protocol order as well (see protocol at bottom of note). CXR Findings:  XR CHEST PORTABLE    Result Date: 6/27/2022  No significant interval change in bilateral airspace disease and small effusions. Unchanged positioning of support devices. XR CHEST PORTABLE    Result Date: 6/26/2022  Increasing basilar opacities with probable small effusions. Unchanged positioning of support devices. The findings from the last RT Protocol Assessment were as follows:   History Pulmonary Disease: Chronic pulmonary disease  Respiratory Pattern: Mild dyspnea at rest, irregular pattern, or RR 21-25 bpm  Breath Sounds: Inspiratory and expiratory or bilateral wheezing and/or rhonchi  Cough: Strong, spontaneous, non-productive  Indication for Bronchodilator Therapy: Decreased or absent breath sounds  Bronchodilator Assessment Score: 12    Aerosolized bronchodilator medication orders have been revised according to the RT Inhaler-Nebulizer Bronchodilator Protocol below. Respiratory Therapist to perform RT Therapy Protocol Assessment initially then follow the protocol. Repeat RT Therapy Protocol Assessment PRN for score 0-3 or on second treatment, BID, and PRN for scores above 3. No Indications - adjust the frequency to every 6 hours PRN wheezing or bronchospasm, if no treatments needed after 48 hours then discontinue using Per Protocol order mode. If indication present, adjust the RT bronchodilator orders based on the Bronchodilator Assessment Score as indicated below.   Use Inhaler orders unless patient has one or more of the following: on home nebulizer, not able to hold breath for 10 seconds, is not alert and oriented, cannot activate and use MDI correctly, or respiratory rate 25 breaths per minute or more, then use the equivalent nebulizer order(s) with same Frequency and PRN reasons based on the score. If a patient is on this medication at home then do not decrease Frequency below that used at home. 0-3 - enter or revise RT bronchodilator order(s) to equivalent RT Bronchodilator order with Frequency of every 4 hours PRN for wheezing or increased work of breathing using Per Protocol order mode. 4-6 - enter or revise RT Bronchodilator order(s) to two equivalent RT bronchodilator orders with one order with BID Frequency and one order with Frequency of every 4 hours PRN wheezing or increased work of breathing using Per Protocol order mode. 7-10 - enter or revise RT Bronchodilator order(s) to two equivalent RT bronchodilator orders with one order with TID Frequency and one order with Frequency of every 4 hours PRN wheezing or increased work of breathing using Per Protocol order mode. 11-13 - enter or revise RT Bronchodilator order(s) to one equivalent RT bronchodilator order with QID Frequency and an Albuterol order with Frequency of every 4 hours PRN wheezing or increased work of breathing using Per Protocol order mode. Greater than 13 - enter or revise RT Bronchodilator order(s) to one equivalent RT bronchodilator order with every 4 hours Frequency and an Albuterol order with Frequency of every 2 hours PRN wheezing or increased work of breathing using Per Protocol order mode. Patient marybel benefit from treatments.      Electronically signed by Leonardo Sargent RCP on 6/27/2022 at 8:40 PM

## 2022-06-28 NOTE — PROGRESS NOTES
06/28/22 0246   NIV Type   Equipment Type V60   Mode Bilevel   Mask Type Full face mask   Mask Size Medium   Settings/Measurements   IPAP 16 cmH20   CPAP/EPAP 8 cmH2O   Rate Ordered 14   Resp (!) 33   FiO2  30 %   Vt Exhaled 598 mL   Mask Leak (lpm) 0 lpm   Comfort Level Good   Using Accessory Muscles No   SpO2 100   Patient's Home Machine No   Alarm Settings   Alarms On Y   Oxygen Therapy/Pulse Ox   Heart Rate (!) 121   SpO2 100 %

## 2022-06-28 NOTE — PROGRESS NOTES
06/27/22 2256   NIV Type   Equipment Type V60   Mode Bilevel   Mask Type Full face mask   Mask Size Medium   Settings/Measurements   IPAP 16 cmH20   CPAP/EPAP 8 cmH2O   Rate Ordered 14   Resp (!) 31   FiO2  30 %   Vt Exhaled 572 mL   Mask Leak (lpm) 2 lpm   Comfort Level Good   Using Accessory Muscles No   SpO2 100   Patient's Home Machine No   Alarm Settings   Alarms On Y   Oxygen Therapy/Pulse Ox   Heart Rate (!) 124   SpO2 100 %

## 2022-06-28 NOTE — PROGRESS NOTES
[facility] Progress Note           Subjective/   80y.o. year old female who we are seeing in consultation for FARHEEN/CKD.      HPI:  Patient extubated on 6/27  Received lasix on 6/25  Zimmerman catheter has been removed on 6/27  Creatinine 2.5-->2.7-->2.5  Still with significant swelling    ROS:  +weak    SH: Visitor present    Objective/   GEN:  Chronically ill, /80   Pulse (!) 119   Temp 99 °F (37.2 °C) (Axillary)   Resp (!) 33   Ht 5' 6\" (1.676 m)   Wt 264 lb 8.8 oz (120 kg)   SpO2 100%   BMI 42.70 kg/m²   HEENT: non-icteric, no JVD  CV: S1, S2 without m/r/g; ++ LE edema  RESP: Decreased breath sounds  ABD: +bs, soft, nt, no hsm  SKIN: warm, no rashes    Data/  Recent Labs     06/26/22  0510 06/27/22  0450 06/28/22  0448   WBC 10.6 12.3* 15.9*   HGB 8.8* 8.4* 9.5*   HCT 26.9* 26.2* 30.3*   MCV 85.8 86.1 87.0    267 353     Recent Labs     06/26/22  0510 06/27/22  0450 06/28/22  0448    137 140   K 3.3* 3.7 3.9    105 107   CO2 19* 19* 17*   GLUCOSE 145* 155* 104*   PHOS 3.4 3.5 3.8   MG 1.90 2.10 2.10   BUN 54* 59* 60*   CREATININE 2.5* 2.7* 2.5*   LABGLOM 19* 17* 19*   GFRAA 22* 21* 22*     Component      Latest Ref Rng & Units 6/22/2022 6/22/2022           4:10 PM  4:10 PM   Color, UA      Straw/Yellow Yellow    Clarity, UA      Clear Clear    Glucose, UA      Negative mg/dL Negative    Bilirubin, Urine      Negative Negative    Ketones, Urine      Negative mg/dL TRACE (A)    Specific Gravity, UA      1.005 - 1.030 1.025    Blood, Urine      Negative Negative    pH, UA      5.0 - 8.0 5.5    Protein, UA      Negative mg/dL TRACE (A)    Urobilinogen, Urine      <2.0 E.U./dL 0.2    Nitrite, Urine      Negative Negative    Leukocyte Esterase, Urine      Negative Negative    Microscopic Examination       YES    Urine Type       NotGiven    Mucus, UA      None Seen /LPF Rare (A)    WBC, UA      0 - 5 /HPF 10-20 (A)    RBC, UA      0 - 4 /HPF 3-4    Epithelial Cells, UA      0 - 5 /HPF 0-1    Bacteria, UA      None Seen /HPF 2+ (A)    Sodium, Ur      Not Established mmol/L  <20   Potassium, Ur      Not Established mmol/L  26.3   Chloride      Not Established mmol/L  <20       Assessment/     - Acute kidney injury - multi-factorial ATN injury in setting of aspiration PNA, hypotension, & elevated vancomycin levels     - Chronic kidney disease stage 3b - background DM/HTN/CHF              SCr 1.6              Follows with [facility]     - Acute hypoxemic respiratory failure/Aspiration PNA/Pulmonary edema - on ventilator     - Diastolic CHF     - Atrial fibrillation    - Anemia - prn prbc's     - Hypertension    - Hypokalemia - prn replacement    -Metabolic acidosis    Plan/   -IV Lasix 40 mg x 1   -Status post 1 amp of sodium bicarb  - Monitoring off of ARB  -Serial renal panel  -daily wts and strict i/o  -renal dose medications   -avoid nephrotoxins    ____________________________________  Pj Hartman MD  [facility]  [email or web address]  Office: [phone]

## 2022-06-28 NOTE — PROGRESS NOTES
Inpatient Physical Therapy Evaluation and Treatment    Unit: ICU  Date:  6/28/2022  Patient Name:    Maverick Grady  Admitting diagnosis:  Shortness of breath [R06.02]  Septicemia ([facility] [address] [A41.9]  Chronic anemia [D64.9]  Acute respiratory failure with hypoxia ([facility] [address] [J96.01]  Aspiration pneumonia of left lower lobe, unspecified [facility] [J69.0]  Chronic kidney disease, unspecified CKD stage [N18.9]  Admit Date:  6/17/2022  Precautions/Restrictions/WB Status/ Lines/ Wounds/ Oxygen: Fall risk, Lines -IV, Supplemental O2 (2L), Purewick catheter and rectal tube, Telemetry, Continuous pulse oximetry and Isolation Precautions: Contact (MRSA), ICU monitoring. RN cleared for bed level activity today. Treatment Time:  13:45-14:23  Treatment Number:  1   Timed Code Treatment Minutes: 28 minutes  Total Treatment Minutes:  38  minutes    Patient Goals for Therapy: \"help me sit up\", \"get up to a toilet\"       Discharge Recommendations: SNF  DME needs for discharge: defer to facility       Therapy recommendation for EMS Transport: requires transport by cot due to need for lift equip to transfer. Therapy recommendations for staff:   Assist of 2 for rolling. History of Present Illness: Admitted 6/17 for resp failure from CHF, PNA. Intubated 6/18, extubated to BiPap 2/56, with metabolic acidosis. PMHx including not limited to: Parkinson's Disease, CKD IIIB, paroxysmal a-fib, HTN    Home Health S4 Level Recommendation:  NA  AM-PAC Mobility Score       AM-PAC Inpatient Mobility without Stair Climbing Raw Score : 7    Preadmission Environment    Pt. Lives with spouse  Home environment:    one story home  Steps to enter first floor: ramp  Steps to second floor: N/A  Bathroom: walk in shower, shower seat , grab bars, raised commode   Equipment owned: RW, wc (manual), BSC and Bi pap, HHN, electric W?C      Preadmission Status:  Pt. Able to drive: No  Pt Fully independent with ADLs: No  assist   Pt. Required assistance from family for: Cleaning, Cooking and Laundry   Pt. independent for transfers and gait and walked with Rollator (questionable - based on later comment RE working with home therapist for walking with w/c follow)  History of falls Unknown   Sleeps in a recliner   Was getting home therapy 1x a week. Pain   No    Cognition    A&O Person and Place  (says it's mid-June)  Able to follow 1 step commands    Subjective  Patient lying supine in bed with no family present. States  was here earlier. Pt agreeable to this PT eval & tx. Upper Extremity ROM/Strength  Please see OT evaluation. Lower Extremity ROM / Strength   AROM WFL: No, supine AROM limited by weakness. PROM WFL. Strength Assessment (measured on a 0-5 scale): (assessed in supine)  R LE   Quad   2-   Ant Tib  2+   Hamstring Not tested   Iliopsoas 2-  L LE  Quad   2   Ant Tib  2+   Hamstring Not tested   Iliopsoas 2-   Strength: (LLE>RLE)    Lower Extremity Sensation    Impaired - pt reports baseline neuropathy    Lower Extremity Proprioception:   NT    Coordination and Tone  NT    Balance  Sitting:  Not tested; N/A  Comments: N/A    Standing: Not tested; N/A  Comments: N/A    Bed Mobility   Supine to Sit:    Not Tested  Sit to Supine:   Not Tested  Rolling:   Not Tested  Scooting in sitting: Not Tested  Scooting in supine:  Not Tested    Transfer Training     Sit to stand:   Not Tested  Stand to sit:   Not Tested  Bed to Chair:   Not Tested with use of N/A    Gait gait deferred due to medical status; pt ambulated 0 ft. Stair Training deferred, pt unsafe/ not appropriate to complete stairs at this time    Activity Tolerance   Pt completed therapy session with No adverse symptoms noted w/activity. 's  SpO2 high 90%s  RR low 30's. /52    Positioning Needs   Pt in bed, ICU monitoring, positioned in proper neutral alignment and pressure relief provided.    Call light provided and all needs within reach    Exercises Initiated  all completed bilaterally unless indicated and completed in supine position , AAROM for all knee/hip ex; AROM ankle. Ankle circles x 10 reps  Ankle PF with light manual resistance x 10 reps  Ankle Pumps x 10 reps  Heel slides x 10 reps  Hip abduction: x 10 reps  Hip IR/ER x 10 reps AROM (minimal ROM) + stretch x 5 reps both directions    Other  None. Patient/Family Education   Pt educated on role of inpatient PT, POC, importance of continued activity, HEP and calling for assist with mobility. Assessment  Pt seen for Physical Therapy evaluation in acute care setting. Pt demonstrated decreased Activity tolerance, Balance, ROM, Safety and Strength as well as decreased independence with Ambulation, Bed Mobility  and Transfers. Recommending SNF upon discharge as patient functioning well below baseline, demonstrates good rehab potential and unable to return home due to burden of care beyond caregiver ability and home environment not conducive to patient recovery. Goals : To be met in 3 visits:  1). Independent with LE Ex x 10 reps   2). Supine to/from Sit: Max A  3). Tolerate EOB sitting x 10 minutes with Min A    To be met in 6 visits:  1). Supine to/from sit: SBA  2). Sit to/from stand: Min A   3). Bed to chair: Mod A   4). Gait: Ambulate 10 ft.   with  Min A  and use of rolling walker (RW)  5). Tolerate B LE exercises 3 sets of 10-15 reps    Rehabilitation Potential: Good  Strengths for achieving goals include:   Pt motivated, PLOF, Family Support and Pt cooperative   Barriers to achieving goals include:    No Barriers    Plan    To be seen 3-5 x / week  while in acute care setting for therapeutic exercises, bed mobility, transfers, progressive gait training, balance training, and family/patient education. Signature: Anette Gunter, PT, DPT    If patient discharges from this facility prior to next visit, this note will serve as the Discharge Summary.

## 2022-06-28 NOTE — PROGRESS NOTES
Speech Language Pathology  Clinical Bedside Swallow Assessment  Facility/Department: [facility] ICU    Instrumentation: Yes. MBSS is warranted to further assess oropharyngeal structures and functions. Diet recommendation: PLEASURE FEEDS ONLY IDDSI 4 Puree Solids; IDDSI 0 Thin Liquids; Meds crushed in puree as able   Risk management: upright for all intake, stay upright for at least 30 mins after intake, small bites/sips, assist feed, oral care q4 hrs to reduce adverse affects in the event of aspiration, increase physical mobility as able, alternate bites/sips, slow rate of intake, coordinate PO intake with Sinemet dosage, general GERD precautions, general aspiration precautions and hold PO and contact SLP if s/s of aspiration or worsening respiratory status develop.     Luz Colbert  : 1940 (80 y.o.)   MRN: [id number]  [place] [facility] [place] [facility] [place]  ADMISSION DATE: 2022  PATIENT DIAGNOSIS(ES): Shortness of breath [R06.02]  Septicemia (HCC) [A41.9]  Chronic anemia [D64.9]  Acute respiratory failure with hypoxia (HCC) [J96.01]  Aspiration pneumonia of left lower lobe, unspecified aspiration pneumonia type ([facility] [J69.0]  Chronic kidney disease, unspecified CKD stage [N18.9]  Chief Complaint   Patient presents with    Shortness of Breath     Patient Active Problem List    Diagnosis Date Noted    Chronic anemia     Mild protein-calorie malnutrition ([facility] 2022    Shortness of breath 2022    Aspiration pneumonia of left lower lobe (HCC)     Sensorineural hearing loss, bilateral 2022    Laceration of left lower extremity     Pulmonary nodules     C. difficile colitis 10/07/2021    Chest pain 2021    Pulmonary HTN ([address]     Mitral valve insufficiency     Sialadenitis     Colonic pseudoobstruction     Chronic kidney disease 2020    Partial symptomatic epilepsy with complex partial seizures, not intractable, without status epilepticus ([address]     Diastolic congestive heart failure ([facility] 01/17/2019    Acute respiratory failure with hypoxia (HCC)     COPD (chronic obstructive pulmonary disease) ([facility] 12/10/2018    PAF (paroxysmal atrial fibrillation) ([facility]     Septicemia ([facility] 09/08/2018    Severe obstructive sleep apnea     Hepatic steatosis     Diverticulosis     Blind left eye     Parkinson disease ([facility] 05/22/2018    CAD (coronary artery disease)     Morbid obesity with BMI of 40.0-44.9, adult ([facility] 02/10/2017    Type 2 diabetes mellitus with stage 4 chronic kidney disease, without long-term current use of insulin ([facility] 02/10/2017    Moderate persistent asthma 08/09/2016    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-sided sciatica 08/09/2016    Hypothyroidism 01/27/2015    Postural dizziness 02/20/2013    Anemia of chronic disease     GERD (gastroesophageal reflux disease)     Essential hypertension     Hyperlipidemia     Restless leg syndrome     L carotid artery stenosis s/p CEA 01/01/2002     Past Medical History:   Diagnosis Date    Anemia, unspecified 2010    neg bone marrow    Asthma     Atrial fibrillation ([facility]     Baker's cyst     Basal cell carcinoma of left cheek 3/12/2019    Blind left eye     Carotid artery stenoses     Carotid stenosis 2002    left    Cervical spinal cord compression ([facility] 11/2010    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-sided sciatica 8/9/2016    CKD (chronic kidney disease) stage 3, GFR 30-59 ml/min ([facility])     Community acquired pneumonia of left lower lobe of lung 9/8/2018    CRI     Detached retina, left     Diverticulosis     DJD (degenerative joint disease)     Edema     chronic    Fatty liver     GERD (gastroesophageal reflux disease)     Hearing loss     Herniated lumbar intervertebral disc     Hx stage 2 sacral decubitus ulcer 4/13/2018    Hyperlipidemia     Hypertension     Hypothyroid 1/27/2015    Morbid obesity with BMI of 40.0-44.9, adult ([facility] 2/10/2017    BETSY treated with BiPAP     Parkinson's disease ([facility]     Partial symptomatic epilepsy with complex partial seizures, not intractable, without status epilepticus ([facility] 1/24/2020    Restless leg syndrome     Restless legs syndrome     Rheumatic fever 1950    2x IN [place]    Sleep apnea     Tremor, essential 8/16/2010    Type 2 diabetes mellitus without complication ([facility]     Type II or unspecified type diabetes mellitus without mention of complication, not stated as uncontrolled      Past Surgical History:   Procedure Laterality Date    APPENDECTOMY      CAROTID ENDARTERECTOMY      left    CATARACT REMOVAL      CERVICAL FUSION  11/2010    CHOLECYSTECTOMY      COLONOSCOPY N/A 3/8/2020    COLONOSCOPY FLEXIBLE W/ DECOMPRESSION performed by Spencer Johnson MD at [address] (CERVIX STATUS UNKNOWN)      IR MIDLINE CATH  10/8/2021    IR MIDLINE CATH 10/8/2021 [facility]    JOINT REPLACEMENT Bilateral 2003    KNEE SURGERY      replacement x 2    OVARY REMOVAL      SHOULDER SURGERY      TONSILLECTOMY      TUBAL LIGATION      UPPER GASTROINTESTINAL ENDOSCOPY  03/17/2020    Esophagitis    UPPER GASTROINTESTINAL ENDOSCOPY N/A 3/17/2020    EGD performed by Sonido Hopkins MD at [facility]       Allergies   Allergen Reactions    Levaquin [Levofloxacin]      Pt was given in er and developed hives and redness    Metoclopramide     Phenazopyridine     Pyridium [Phenazopyridine Hcl]     Reglan [Metoclopramide Hcl]     Reglan [Metoclopramide Hcl]      tremors       DATE ONSET: 06/16/2022    Date of Evaluation: 6/28/2022   Evaluating Therapist: Forestine Riggers, SLP    Chart Reviewed: : [x] Yes [] No    Current Diet: Diet NPO    Recent Chest Radiography: [x] Chest XR   [] CT of Chest  Date: 06/27/2022  Impressions  Impression   No significant interval change in bilateral airspace disease and small   effusions.  Unchanged positioning of support devices. Pain: The patient does not complain of pain    Reason for Referral  Yamini Hall was referred for a bedside swallow evaluation to assess the efficiency of their swallow function, identify signs and symptoms of aspiration and make recommendations regarding safe dietary consistencies, effective compensatory strategies, and safe eating environment. Assessment    Medical record review/interview: Per MD H&P: \"The patient is a 80 y.o. female Hx of Parkinson's disease, Afib on Eliquis, BETSY, CKD, lives at home with , who presents with SOB and cough.      Pt reports cough and SOB and throat pain and odynophagia and dysphagia coughing with every meal. Her cough is deep and rhonchorous but she is struggling to bring up any secretions. She is not sure about fever.      She was initially admitted to [facility] [place] with concerns for aspiration PNA and new hypoxia.      This am she is in resp distress, very poor and weak cough, O2 sats 70s despite 10-12l/min O2 - transitioned to NRB mask with sats improved to 92% and then transitioned to vapotherm.      I ordered to transfer to ICU due to rapid worsening of oxygenation and pt struggling managing own secretion with very weak cough. Ordered 3% saline nebs and repeat CXR and Pulm evaluation. Given rapid progression of hypoxia and clinical status I strongly suspect mucus plugging.      She is to stay strict NPO. \"        Patient Complaints:   Odynophagia: [] Yes [x] No  Globus Sensation: [x] Yes [] No  SOB with PO intake: [x] Yes [x] No  Increased WOB with PO intake: [] Yes [x] No  Reflux Sx's: [x] Yes [] No  Weight loss: [] Yes [x] No  Coughing/Choking with PO intake: [x] Yes [] No  Reduced Appetite: [x] Yes [] No    Additional Reported Symptoms/Complaints: We were initially consulted on 06/17 upon admission as pt was admitted with asipration PNA. Plan was to complete MBS.  Pt was noted with increasing Upper Dentures  [] Lower Dentures  [] Missing/Broken Teeth  [] Evidence of dental cavities/carries    PO trials:   Ice: no anterior bolus loss, Suspect functional A-P bolus transit, swallow timing subjectively appears timely, and no clinical s/s of aspiration. IDDSI 0 (thin) 5cc Bolus (tsp), Cup, Straw: no anterior bolus loss, suspect functional A-P bolus transit, suspect premature spillage to pharynx, suspect delayed swallow onset, and no clinical s/s of aspiration. IDDSI 4 (puree): suspect functional A-P bolus transit, suspect premature spillage to pharynx, suspect delayed swallow onset, and no clinical s/s of aspiration. IDDSI 5 (minced and moist): mastication grossly WFL, suspect functional A-P bolus transit, suspect premature spillage to pharynx, suspect delayed swallow onset, immediate throat clear in 3/3 trials and immediate cough in 2/3 trials. 3 oz water: DNT    Impressions:    Clinical signs of oropharyngeal dysphagia likely acute-on-chronic related to hx of dysphagia, reduced physical mobility, increased O2 demands, respiratory illness, fatigue, disuse atrophy, generalized weakness, Parkinson's Disease, Progressive nature of disease/condition, the aging swallow and intubation. Swallow prognosis is guarded. Instrumental swallow study is indicated. Given tolerance to PO at bedside, caregiver support and potential for spontaneous improvement, pt is not safe for oral diet but is okay for pleasure feeds prior to instrumental study. Instrumentation: Yes. MBSS is warranted to further assess oropharyngeal structures and functions. Diet recommendation: PLEASURE FEEDS ONLY IDDSI 4 Puree Solids; IDDSI 0 Thin Liquids;  Meds crushed in puree as able   Risk management: upright for all intake, stay upright for at least 30 mins after intake, small bites/sips, assist feed, oral care q4 hrs to reduce adverse affects in the event of aspiration, increase physical mobility as able, alternate bites/sips, slow rate of intake, coordinate PO intake with Sinemet dosage, general GERD precautions, general aspiration precautions and hold PO and contact SLP if s/s of aspiration or worsening respiratory status develop. Prognosis: Guarded    Recommended Intervention:  [x] Dysphagia tx  [] Videostroboscopy                      [] NPO   [x] MBS       [] Speech/Cog Eval    [x] Therapeutic PO Trials     [] Ice Chips   [] Other:  [] FEES                                                 Dysphagia Therapeutic Intervention:  []  Bolus control Exercises  []  Oral Motor Exercises  []  Curiel Water Protocol  []  Thermal Stimulation  []  Oral Care    []  Vital Stim/NMES  []  Laryngeal Exercises  [x]  Patient/Family Education  []  Pharyngeal Exercises  [x]  Therapeutic PO trials with SLP  [x]  Diet tolerance monitoring  []  Other:     Referrals:  [] ENT    [] PT  [x] Pulmonology [x] GI  [x] Neurology  [x] RD  [] OT   []     Goals:  Short Term Goals:  Timeframe for Short Term Goals: (5 days 07/03/2022)  Goal 1: The patient will tolerate instrumental assessment when able   Goal 2: The patient will tolerate recommended diet with no clinical s/s of aspiration 5/5  Goal 3: The patient will tolerate therapeutic diet upgrade trials with no clinical s/s of aspiration 5/5    Long Term Goals:   Timeframe for Long Term Goals: (7 days 07/05/2022)  Goal 1: The patient will tolerate least restrictive diet with no clinical s/s of aspiration or worsening respiratory/pulmonary status    Treatment:  Skilled instruction completed with patient re: evidenced based practice regarding recommendations and POC, importance of oral care to reduce adverse affects in the event of aspiration, and instruction of recommended compensatory strategies developed based upon clinical exam. Pt able to recall/demonstrate compensatory strategies with mod cues.       Pt Education: SLP educated the patient re: Role of SLP, rationale for completion of assessment, results of assessment, recommendations and POC  Pt Education Response: verbalized understanding, would benefit from ongoing education, RN aware and caregiver aware    Duration/Frequency of Tx: 3-5x/wk    Individuals Consulted:   [x]  Patient     []  NP         [x]  RN   []  RD                   []  MD      [x]  Family Member                        []  PA    []  Other:      Safety Devices / Report:  [x]  All fall risk precautions in place [x]  Safety handoff completed with RN  [x]  Bed alarm in place  [x]  Left in bed     []  Chair alarm in place  []  Left in chair   [x]  Call light in reach   []  Other:                                Total Treatment Time / Charges       Time in Time out Total Time / units   Swallow Eval/Tx Time  1133 1211 38 min/2 units     Signature:  Jolanta Waddell M.A., [address] [id number]  Speech-Language Pathologist  Phone: [phone], Katy Mulligan  Clinician

## 2022-06-28 NOTE — PLAN OF CARE
Problem: Discharge Planning  Goal: Discharge to home or other facility with appropriate resources  Outcome: Progressing  Flowsheets (Taken 6/27/2022 2000)  Discharge to home or other facility with appropriate resources: Identify barriers to discharge with patient and caregiver     Problem: Pain  Goal: Verbalizes/displays adequate comfort level or baseline comfort level  Outcome: Progressing  Flowsheets (Taken 6/27/2022 2000)  Verbalizes/displays adequate comfort level or baseline comfort level:   Encourage patient to monitor pain and request assistance   Assess pain using appropriate pain scale   Administer analgesics based on type and severity of pain and evaluate response   Implement non-pharmacological measures as appropriate and evaluate response     Problem: ABCDS Injury Assessment  Goal: Absence of physical injury  Outcome: Progressing     Problem: Skin/Tissue Integrity  Goal: Absence of new skin breakdown  Description: 1. Monitor for areas of redness and/or skin breakdown  2. Assess vascular access sites hourly  3. Every 4-6 hours minimum:  Change oxygen saturation probe site  4. Every 4-6 hours:  If on nasal continuous positive airway pressure, respiratory therapy assess nares and determine need for appliance change or resting period.   Outcome: Progressing     Problem: Chronic Conditions and Co-morbidities  Goal: Patient's chronic conditions and co-morbidity symptoms are monitored and maintained or improved  Outcome: Progressing  Flowsheets (Taken 6/27/2022 2000)  Care Plan - Patient's Chronic Conditions and Co-Morbidity Symptoms are Monitored and Maintained or Improved: Monitor and assess patient's chronic conditions and comorbid symptoms for stability, deterioration, or improvement     Problem: Safety - Adult  Goal: Free from fall injury  Outcome: Progressing     Problem: Nutrition Deficit:  Goal: Optimize nutritional status  Outcome: Progressing     Pt resting in bed, tolerating bipap, able to answer questions, tires easily, reviewed with pt plan of care for shift and medications, all questions answered, pt voices no concerns.

## 2022-06-28 NOTE — PROGRESS NOTES
Pt awake and aware, placed on 8L NC with SpO2 at 100%. Pt  able to follow directions and cough upon request. Has weak cough producing thin clear secretions. Pt bathed and mouth care preformed. Pt passed nurse bedside swallow screen for medications. Pt HR remains in the 120's, will monitor.

## 2022-06-29 LAB
ANION GAP SERPL CALCULATED.3IONS-SCNC: 16 MMOL/L (ref 3–16)
BASE EXCESS VENOUS: -6.3 MMOL/L (ref -3–3)
BASOPHILS ABSOLUTE: 0 K/UL (ref 0–0.2)
BASOPHILS RELATIVE PERCENT: 0.2 %
BUN BLDV-MCNC: 56 MG/DL (ref 7–20)
CALCIUM SERPL-MCNC: 9.3 MG/DL (ref 8.3–10.6)
CARBOXYHEMOGLOBIN: 2.6 % (ref 0–1.5)
CHLORIDE BLD-SCNC: 106 MMOL/L (ref 99–110)
CO2: 19 MMOL/L (ref 21–32)
CREAT SERPL-MCNC: 2.8 MG/DL (ref 0.6–1.2)
EOSINOPHILS ABSOLUTE: 0.2 K/UL (ref 0–0.6)
EOSINOPHILS RELATIVE PERCENT: 1.5 %
GFR AFRICAN AMERICAN: 20
GFR NON-AFRICAN AMERICAN: 16
GLUCOSE BLD-MCNC: 106 MG/DL (ref 70–99)
GLUCOSE BLD-MCNC: 93 MG/DL (ref 70–99)
HCO3 VENOUS: 18.9 MMOL/L (ref 23–29)
HCT VFR BLD CALC: 31 % (ref 36–48)
HEMOGLOBIN: 9.9 G/DL (ref 12–16)
LYMPHOCYTES ABSOLUTE: 1 K/UL (ref 1–5.1)
LYMPHOCYTES RELATIVE PERCENT: 6.4 %
MAGNESIUM: 2.1 MG/DL (ref 1.8–2.4)
MCH RBC QN AUTO: 27.6 PG (ref 26–34)
MCHC RBC AUTO-ENTMCNC: 32 G/DL (ref 31–36)
MCV RBC AUTO: 86.2 FL (ref 80–100)
METHEMOGLOBIN VENOUS: 0.3 %
MONOCYTES ABSOLUTE: 1.2 K/UL (ref 0–1.3)
MONOCYTES RELATIVE PERCENT: 7.9 %
NEUTROPHILS ABSOLUTE: 12.7 K/UL (ref 1.7–7.7)
NEUTROPHILS RELATIVE PERCENT: 84 %
O2 SAT, VEN: 98 %
O2 THERAPY: ABNORMAL
PCO2, VEN: 36.5 MMHG (ref 40–50)
PDW BLD-RTO: 14.7 % (ref 12.4–15.4)
PERFORMED ON: NORMAL
PH VENOUS: 7.33 (ref 7.35–7.45)
PHOSPHORUS: 4.1 MG/DL (ref 2.5–4.9)
PLATELET # BLD: 361 K/UL (ref 135–450)
PMV BLD AUTO: 7.9 FL (ref 5–10.5)
PO2, VEN: 112.2 MMHG (ref 25–40)
POTASSIUM SERPL-SCNC: 3.7 MMOL/L (ref 3.5–5.1)
RBC # BLD: 3.59 M/UL (ref 4–5.2)
SODIUM BLD-SCNC: 141 MMOL/L (ref 136–145)
TCO2 CALC VENOUS: 20 MMOL/L
WBC # BLD: 15.1 K/UL (ref 4–11)

## 2022-06-29 PROCEDURE — 2700000000 HC OXYGEN THERAPY PER DAY

## 2022-06-29 PROCEDURE — 6370000000 HC RX 637 (ALT 250 FOR IP): Performed by: INTERNAL MEDICINE

## 2022-06-29 PROCEDURE — 1200000000 HC SEMI PRIVATE

## 2022-06-29 PROCEDURE — 84100 ASSAY OF PHOSPHORUS: CPT

## 2022-06-29 PROCEDURE — 2060000000 HC ICU INTERMEDIATE R&B

## 2022-06-29 PROCEDURE — 99233 SBSQ HOSP IP/OBS HIGH 50: CPT | Performed by: INTERNAL MEDICINE

## 2022-06-29 PROCEDURE — 80048 BASIC METABOLIC PNL TOTAL CA: CPT

## 2022-06-29 PROCEDURE — 83735 ASSAY OF MAGNESIUM: CPT

## 2022-06-29 PROCEDURE — 6360000002 HC RX W HCPCS: Performed by: INTERNAL MEDICINE

## 2022-06-29 PROCEDURE — C9113 INJ PANTOPRAZOLE SODIUM, VIA: HCPCS | Performed by: INTERNAL MEDICINE

## 2022-06-29 PROCEDURE — 94761 N-INVAS EAR/PLS OXIMETRY MLT: CPT

## 2022-06-29 PROCEDURE — 92526 ORAL FUNCTION THERAPY: CPT

## 2022-06-29 PROCEDURE — 82803 BLOOD GASES ANY COMBINATION: CPT

## 2022-06-29 PROCEDURE — 2580000003 HC RX 258: Performed by: INTERNAL MEDICINE

## 2022-06-29 PROCEDURE — 31720 CLEARANCE OF AIRWAYS: CPT

## 2022-06-29 PROCEDURE — 94640 AIRWAY INHALATION TREATMENT: CPT

## 2022-06-29 PROCEDURE — 94660 CPAP INITIATION&MGMT: CPT

## 2022-06-29 PROCEDURE — 85025 COMPLETE CBC W/AUTO DIFF WBC: CPT

## 2022-06-29 RX ADMIN — CARBIDOPA AND LEVODOPA 1 TABLET: 25; 100 TABLET ORAL at 21:39

## 2022-06-29 RX ADMIN — Medication 2 PUFF: at 07:20

## 2022-06-29 RX ADMIN — LAMOTRIGINE 50 MG: 25 TABLET ORAL at 21:40

## 2022-06-29 RX ADMIN — CARBIDOPA AND LEVODOPA 1 TABLET: 25; 100 TABLET ORAL at 08:05

## 2022-06-29 RX ADMIN — CARBIDOPA AND LEVODOPA 1 TABLET: 25; 100 TABLET ORAL at 17:23

## 2022-06-29 RX ADMIN — Medication 15 ML: at 21:44

## 2022-06-29 RX ADMIN — PANTOPRAZOLE SODIUM 40 MG: 40 INJECTION, POWDER, LYOPHILIZED, FOR SOLUTION INTRAVENOUS at 08:05

## 2022-06-29 RX ADMIN — MONTELUKAST SODIUM 10 MG: 10 TABLET, COATED ORAL at 08:05

## 2022-06-29 RX ADMIN — PROPAFENONE HYDROCHLORIDE 150 MG: 150 TABLET, FILM COATED ORAL at 13:03

## 2022-06-29 RX ADMIN — SODIUM CHLORIDE, PRESERVATIVE FREE 10 ML: 5 INJECTION INTRAVENOUS at 08:05

## 2022-06-29 RX ADMIN — CARBIDOPA AND LEVODOPA 1 TABLET: 25; 100 TABLET ORAL at 13:03

## 2022-06-29 RX ADMIN — IPRATROPIUM BROMIDE AND ALBUTEROL SULFATE 1 AMPULE: 2.5; .5 SOLUTION RESPIRATORY (INHALATION) at 15:05

## 2022-06-29 RX ADMIN — METOPROLOL TARTRATE 25 MG: 25 TABLET, FILM COATED ORAL at 10:19

## 2022-06-29 RX ADMIN — DULOXETINE HYDROCHLORIDE 60 MG: 60 CAPSULE, DELAYED RELEASE ORAL at 08:05

## 2022-06-29 RX ADMIN — IPRATROPIUM BROMIDE AND ALBUTEROL SULFATE 1 AMPULE: 2.5; .5 SOLUTION RESPIRATORY (INHALATION) at 07:20

## 2022-06-29 RX ADMIN — PANTOPRAZOLE SODIUM 40 MG: 40 INJECTION, POWDER, LYOPHILIZED, FOR SOLUTION INTRAVENOUS at 21:46

## 2022-06-29 RX ADMIN — APIXABAN 2.5 MG: 5 TABLET, FILM COATED ORAL at 08:05

## 2022-06-29 RX ADMIN — APIXABAN 2.5 MG: 5 TABLET, FILM COATED ORAL at 21:39

## 2022-06-29 RX ADMIN — SODIUM CHLORIDE, PRESERVATIVE FREE 10 ML: 5 INJECTION INTRAVENOUS at 21:46

## 2022-06-29 RX ADMIN — IPRATROPIUM BROMIDE AND ALBUTEROL SULFATE 1 AMPULE: 2.5; .5 SOLUTION RESPIRATORY (INHALATION) at 11:32

## 2022-06-29 RX ADMIN — Medication 2 PUFF: at 19:26

## 2022-06-29 RX ADMIN — METOPROLOL TARTRATE 25 MG: 25 TABLET, FILM COATED ORAL at 21:39

## 2022-06-29 RX ADMIN — PROPAFENONE HYDROCHLORIDE 150 MG: 150 TABLET, FILM COATED ORAL at 21:39

## 2022-06-29 RX ADMIN — ATORVASTATIN CALCIUM 40 MG: 40 TABLET, FILM COATED ORAL at 21:39

## 2022-06-29 RX ADMIN — IRON SUCROSE 100 MG: 20 INJECTION, SOLUTION INTRAVENOUS at 08:13

## 2022-06-29 RX ADMIN — PROPAFENONE HYDROCHLORIDE 150 MG: 150 TABLET, FILM COATED ORAL at 05:04

## 2022-06-29 RX ADMIN — IPRATROPIUM BROMIDE AND ALBUTEROL SULFATE 1 AMPULE: 2.5; .5 SOLUTION RESPIRATORY (INHALATION) at 19:26

## 2022-06-29 RX ADMIN — LAMOTRIGINE 50 MG: 25 TABLET ORAL at 08:04

## 2022-06-29 NOTE — PROGRESS NOTES
RT Inhaler-Nebulizer Bronchodilator Protocol Note    There is a bronchodilator order in the chart from a provider indicating to follow the RT Bronchodilator Protocol and there is an Initiate RT Inhaler-Nebulizer Bronchodilator Protocol order as well (see protocol at bottom of note). CXR Findings:  No results found. The findings from the last RT Protocol Assessment were as follows:   History Pulmonary Disease: (P) Chronic pulmonary disease  Respiratory Pattern: (P) Dyspnea on exertion or RR 21-25 bpm  Breath Sounds: (P) Inspiratory and expiratory or bilateral wheezing and/or rhonchi  Cough: (P) Weak, productive  Indication for Bronchodilator Therapy: On home bronchodilators  Bronchodilator Assessment Score: (P) 12    Aerosolized bronchodilator medication orders have been revised according to the RT Inhaler-Nebulizer Bronchodilator Protocol below. Respiratory Therapist to perform RT Therapy Protocol Assessment initially then follow the protocol. Repeat RT Therapy Protocol Assessment PRN for score 0-3 or on second treatment, BID, and PRN for scores above 3. No Indications - adjust the frequency to every 6 hours PRN wheezing or bronchospasm, if no treatments needed after 48 hours then discontinue using Per Protocol order mode. If indication present, adjust the RT bronchodilator orders based on the Bronchodilator Assessment Score as indicated below. Use Inhaler orders unless patient has one or more of the following: on home nebulizer, not able to hold breath for 10 seconds, is not alert and oriented, cannot activate and use MDI correctly, or respiratory rate 25 breaths per minute or more, then use the equivalent nebulizer order(s) with same Frequency and PRN reasons based on the score. If a patient is on this medication at home then do not decrease Frequency below that used at home.     0-3 - enter or revise RT bronchodilator order(s) to equivalent RT Bronchodilator order with Frequency of every 4 hours PRN for wheezing or increased work of breathing using Per Protocol order mode. 4-6 - enter or revise RT Bronchodilator order(s) to two equivalent RT bronchodilator orders with one order with BID Frequency and one order with Frequency of every 4 hours PRN wheezing or increased work of breathing using Per Protocol order mode. 7-10 - enter or revise RT Bronchodilator order(s) to two equivalent RT bronchodilator orders with one order with TID Frequency and one order with Frequency of every 4 hours PRN wheezing or increased work of breathing using Per Protocol order mode. 11-13 - enter or revise RT Bronchodilator order(s) to one equivalent RT bronchodilator order with QID Frequency and an Albuterol order with Frequency of every 4 hours PRN wheezing or increased work of breathing using Per Protocol order mode. Greater than 13 - enter or revise RT Bronchodilator order(s) to one equivalent RT bronchodilator order with every 4 hours Frequency and an Albuterol order with Frequency of every 2 hours PRN wheezing or increased work of breathing using Per Protocol order mode.        Electronically signed by Gabby Glez RCP on 6/29/2022 at 7:42 PM

## 2022-06-29 NOTE — PROGRESS NOTES
This note also relates to the following rows which could not be included:  Heart Rate - Cannot attach notes to unvalidated device data  SpO2 - Cannot attach notes to unvalidated device data       06/28/22 2301   NIV Type   NIV Started/Stopped On   Equipment Type V60   Mode Bilevel   Mask Type Full face mask   Mask Size Medium   Settings/Measurements   IPAP 16 cmH20   CPAP/EPAP 8 cmH2O   Rate Ordered 14   Resp 26   FiO2  30 %   Vt Exhaled 570 mL   Mask Leak (lpm) 9 lpm   Comfort Level Good   Using Accessory Muscles No   SpO2 98   Patient's Home Machine No   Alarm Settings   Alarms On Y        06/28/22 2301   NIV Type   NIV Started/Stopped On   Equipment Type V60   Mode Bilevel   Mask Type Full face mask   Mask Size Medium   Settings/Measurements   IPAP 16 cmH20   CPAP/EPAP 8 cmH2O   Rate Ordered 14   Resp 26   FiO2  30 %   Vt Exhaled 570 mL   Mask Leak (lpm) 9 lpm   Comfort Level Good   Using Accessory Muscles No   SpO2 98   Patient's Home Machine No   Alarm Settings   Alarms On Y

## 2022-06-29 NOTE — PROGRESS NOTES
Hospitalist Progress Note      PCP: Anette Lee MD    Date of Admission: 6/17/2022    Subjective: admitted for resp failure from CHF , pna, eventually intubated    Ms. Angela Martínez remains on vent support, awake on precedex, comfortable     Family decided on trach and PEG   at bedside today     lasix gtt stopped 2/2 bump in creat, on SBT trial this am for 2 hrs    6/27/22- This is my first encounter with the patient. Chart reviewed briefly. Patient seen on the ventilator. Unable to liberate from the vent so far and plans to do a tracheostomy and PEG. ENT consulted. Family wants to wait till Wednesday to see if she will rebleed from the vent. If she does not then plans to put a tracheostomy on Thursday. 6/28-patient was extubated at 1:57 PM on 6/27/2022. She was then placed on BiPAP. She is getting periodic breaks from BiPAP. This morning she is on BiPAP and able to follow commands. Speech therapy is going to see the patient. 6/29/22- doing well. On oxygen in the daytime and Bipap at HS. Doing better. MBS today.        Medications:  Reviewed    Infusion Medications    sodium chloride      sodium chloride       Scheduled Medications    metoprolol tartrate  25 mg Oral BID    ipratropium-albuterol  1 ampule Inhalation Q4H WA    iron sucrose (VENOFER) iv piggyback 100 mL (Admin over 60 minutes)  100 mg IntraVENous Q24H    pantoprazole  40 mg IntraVENous BID    lidocaine 1 % injection  5 mL IntraDERmal Once    chlorhexidine  15 mL Mouth/Throat BID    sodium chloride flush  10 mL IntraVENous 2 times per day    apixaban  2.5 mg Oral BID    atorvastatin  40 mg Oral Nightly    carbidopa-levodopa  1 tablet Oral 4x Daily    DULoxetine  60 mg Oral Daily    budesonide-formoterol  2 puff Inhalation BID    lamoTRIgine  50 mg Oral BID    montelukast  10 mg Oral Daily    propafenone  150 mg Oral 3 times per day     PRN Meds: carboxymethylcellulose PF, sodium chloride (Inhalant), sodium chloride, sodium chloride flush, sodium chloride, promethazine **OR** ondansetron, acetaminophen **OR** acetaminophen, albuterol      Intake/Output Summary (Last 24 hours) at 6/29/2022 1318  Last data filed at 6/29/2022 0400  Gross per 24 hour   Intake 60 ml   Output 1800 ml   Net -1740 ml       Physical Exam Performed:    BP (!) 156/66   Pulse 98   Temp 98.4 °F (36.9 °C) (Oral)   Resp (!) 33   Ht 5' 6\" (1.676 m)   Wt 259 lb 7.7 oz (117.7 kg)   SpO2 94%   BMI 41.88 kg/m²        General Appearance: Elderly white female seen on BiPAP. Able to follow simple commands. Appears to be not in any distress  Mucous Membranes:  Pink , anicteric  Neck: No JVD, no carotid bruit, no thyromegaly  Chest: no accessory muscle use. Clear to auscultation bilaterally, diminished in bases with scattered crackles  Cardiovascular:  RRR S1S2 heard, no murmurs or gallops  Abdomen:  Soft, obese,  undistended, non tender, no organomegaly, BS present  Extremities: diffuse wesly 2 + edema to both LE . Distal pulses well felt  Neurological : Awake and alert. Moves all 4 extremities. Labs:   Recent Labs     06/27/22  0450 06/28/22  0448 06/29/22  0505   WBC 12.3* 15.9* 15.1*   HGB 8.4* 9.5* 9.9*   HCT 26.2* 30.3* 31.0*    353 361     Recent Labs     06/27/22  0450 06/28/22  0448 06/29/22  0505    140 141   K 3.7 3.9 3.7    107 106   CO2 19* 17* 19*   BUN 59* 60* 56*   CREATININE 2.7* 2.5* 2.8*   CALCIUM 7.8* 8.7 9.3   PHOS 3.5 3.8 4.1     No results for input(s): AST, ALT, BILIDIR, BILITOT, ALKPHOS in the last 72 hours. No results for input(s): INR in the last 72 hours. No results for input(s): Lucy Parisian in the last 72 hours.     Urinalysis:      Lab Results   Component Value Date    NITRU Negative 06/22/2022    WBCUA 10-20 06/22/2022    BACTERIA 2+ 06/22/2022    RBCUA 3-4 06/22/2022    BLOODU Negative 06/22/2022    SPECGRAV 1.025 06/22/2022    GLUCOSEU Negative 06/22/2022       Radiology:  XR CHEST PORTABLE   Final Result   No significant interval change in bilateral airspace disease and small   effusions. Unchanged positioning of support devices. XR CHEST PORTABLE   Final Result   Increasing basilar opacities with probable small effusions. Unchanged positioning of support devices. XR CHEST PORTABLE   Final Result   1. No significant change. XR CHEST PORTABLE   Final Result   No significant interval change in basilar predominant airspace disease and   small left greater than right pleural effusions. Pulmonary vascular   congestion. CT ABDOMEN PELVIS WO CONTRAST Additional Contrast? None   Final Result   Colonic diverticulosis without acute diverticulitis. Nonspecific rectal wall   thickening. Partially visualized pleural effusions with adjacent compressive   atelectasis. XR CHEST PORTABLE   Final Result   Again identified is bibasilar airspace disease with small pleural effusions   and mild increased right perihilar infiltrates. This may be related to   atelectasis or pneumonia. Cardiomegaly. XR CHEST PORTABLE   Final Result   Stable examination with bilateral small effusions and bibasilar atelectasis. Stable ET tube and enteric tube. XR CHEST PORTABLE   Final Result   No acute process. Bibasilar hypoaeration      Stable cardiomegaly         IR PICC WO SQ PORT/PUMP > 5 YEARS   Final Result      XR CHEST PORTABLE   Final Result   Supportive tubing projects in stable position. Partial regression of atelectasis in the right lung base. Persisting   consolidation or or atelectasis in the left lung base. XR CHEST PORTABLE   Final Result   1. Endotracheal tube terminates in appropriate position above the nirali. 2.  Enteric tube terminates in the body of the stomach. 3.  Basilar opacities again demonstrated, right greater than left. Interval   improved aeration of the left lung base.          XR CHEST PORTABLE Final Result   Increasing opacity in the right base, lobar atelectasis versus accumulating   right pleural effusion. Combination of both entities or underlying pneumonia   also possible. Persisting left basilar airspace disease and small pleural effusion. XR CHEST PORTABLE   Final Result   Hazy bilateral lobe airspace opacities and small left pleural effusion. This   could represent atelectasis or pneumonia in the appropriate clinical setting. CT CHEST WO CONTRAST   Final Result   1. Respiratory motion in the lower lungs. The right base has linear and   dependent atelectasis. The left does have atelectatic changes but could also   have some patchy opacities from pneumonia or aspiration at the left base. 2.  There is no pleural effusion or pneumothorax. 3.  Narrowing of the AP diameter of the trachea with bulging of the   membranous portion due to the gas distended but nondilated esophagus. XR CHEST PORTABLE   Final Result   1. Bibasilar heterogeneous opacities may represent subsegmental atelectasis   and/or mild pulmonary edema. Underlying infection is difficult to exclude. Short-term follow-up PA and lateral chest radiographs may be helpful for   further evaluation. 2.  Cardiomegaly. Assessment/Plan:    Principal Problem:    Acute respiratory failure with hypoxia (HCC)  Active Problems:    Shortness of breath    Aspiration pneumonia of left lower lobe (HCC)    Mild protein-calorie malnutrition (HCC)    Chronic anemia    Parkinson disease (HCC)    Septicemia (HCC)    Chronic kidney disease  Resolved Problems:    * No resolved hospital problems. *           Acute Hypox Resp Failure resolved. Aspiration PNA. Oxygen requirements increased rapidly from 2l/min to NRB to vapotherm  Currently 100% 40 L on Vapotherm in addition to a nonrebreather. Might have to be intubated. Check Resp Culture. Continue IV Zosyn and vancomycin.  Change Zosyn to cefepime. DC vancomycin 6/22. Finished 10 days of Vanco and cefepime  Intubated 6/18. Extubated on 6/27/2022.  -On BiPAP. Alternate BiPAP with nasal cannula oxygen. Speech therapy to see patient.  -She has mild metabolic acidosis.       Sepsis POA - due to above. resolvd      Dysphagia. Abnormal CT  Cough with every meal intake per family report. Underlying Parkinson's. I ordered for MBS and esophagram, though these will unlikely be done till she is better clinically. Currently on tube feeds     Melena   GI bleed   drop in H/H   GI consulted  resume eliquis  IV PPI BID  Monitor h/h q 6. H&H stable.      FARHEEN on CKD IIIB  Cr slightly  better then baseline actually on admission  Clinically does not appear fluid overloaded. DC IV fluids  Lasix 40 mg IV x2 6/21-with poor response-->lasix gtt  Worsening creatinine. Consulted nephrology. Dc lasix gtt. Hold Lasix. She had mild metabolic acidosis. She received amp of bicarb.       Parkinson's disease  on Sinemet.       Afib Paroxysmal.   Rythmol   Resume Eliquis. This was held after she had bloody secretions during bronch.      HTN - BP on the low side. Nifedipine stopped due to this and also leg edema.    Consider alternative when restarting.          DVT Prophylaxis: Eliquis  eliquis held for bloody secretions seen during bronch and melena  Diet: Diet NPO  Code Status: Full Code    Transfer to Med surg    Destin Adair MD 6/29/2022 1:18 PM

## 2022-06-29 NOTE — PROGRESS NOTES
Speech Language Pathology  Dysphagia Treatment/Follow-Up Note  [facility]    Recommendations: SLP recommends to continue with pleasure feeds of IDDSI 4 Puree Solids; IDDSI 2 Mildly Thick (nectar) Liquids; Meds crushed in puree as able. Recommend FEES to further assess pharyngeal phase of swallow. RN made aware of recommendations. Risk Management: upright for all intake, stay upright for at least 30 mins after intake, small bites/sips, assist feed, oral care q4 hrs to reduce adverse affects in the event of aspiration, increase physical mobility as able, alternate bites/sips, slow rate of intake, coordinate PO intake with Sinemet dosage, general GERD precautions, general aspiration precautions and hold PO and contact SLP if s/s of aspiration or worsening respiratory status develop. Kelsie Kelley  : 1940 (80 y.o.)   MRN: [id number]  ROOM: /0317-01  ADMISSION DATE: 2022  PATIENT DIAGNOSIS(ES): Shortness of breath [R06.02]  Septicemia ([address] [A41.9]  Chronic anemia [D64.9]  Acute respiratory failure with hypoxia (HCC) [J96.01]  Aspiration pneumonia of left lower lobe, unspecified aspiration pneumonia type ([address] [J69.0]  Chronic kidney disease, unspecified CKD stage [N18.9]  Allergies   Allergen Reactions    Levaquin [Levofloxacin]      Pt was given in er and developed hives and redness    Metoclopramide     Phenazopyridine     Pyridium [Phenazopyridine Hcl]     Reglan [Metoclopramide Hcl]     Reglan [Metoclopramide Hcl]      tremors       DATE ONSET: 2022    Pain: The patient does not complain of pain      Current Diet: Diet NPO - PLEASURE FEEDS ONLY IDDSI 4 Puree Solids; IDDSI 0 Thin Liquids; Meds crushed in puree as able     Diet Tolerance:  Pt currently NPO, with recommendations for pleasure feeds.        Dysphagia Treatment and Impressions:   Subjective: Pt seen in room at bedside with RN permission   RN Report/Chart Review: Per RN, pt tolerating pureed solids. Pt not tolerating thin liquids - increased coughing with pleasure feeds.  Patient tolerance: Pt stated she feels like she is \"doing well\" with pleasure feeds.  Respiratory Status: Pt with SPO2% of 93 on room air with RR of 24-30     Liquid PO Trials:    IDDSI 0 Thin:  Assessed via tsp, cup sip, and straw: no anterior bolus loss, suspect functional A-P bolus transit. Suspect premature spillage to pharynx and suspect delayed swallow onset with reduced laryngeal elevation upon palpation of anterior neck. Pt taking large sips of thin via straw despite cues for small sips - audible swallow; suspect rapid spillover BOT. Immediate post swallow cough and wet vocal quality. Via cup sip, pt continued to present with intermittent post-swallow cough.  IDDSI 2 Mildly Thick (nectar):  Assessed via cup and straw: Suspected increased swallowing coordination with thicker liquids. Swallow still mildly delayed, but not audible. No overt s/s of aspiration - no coughing, throat clearing, wet vocal quality.  Solid PO Trials   IDDSI 4 Puree:   No anterior bolus loss. Adequate A-P transit and oral clearance. Suspect premature spillage to pharynx with delayed initiation of swallow, but no overt s/s of aspiration.  IDDSI 5 Minced and Moist:   Slow and prolonged mastication. Mild oral residue; cleared with liquid wash. No overt s/s of aspiration.  Education: SLP edu pt re: Role of SLP, Rationale for dysphagia tx, Recommended compensatory strategies (small bites/sips, slow rate, alternate liquids and solids, fully upright), Aspiration precautions, POC and Rationale for NPO status with pleasure feeds. SLP also spoke with pt and  (on the phone) re: recommendation for FEES. Extensive edu provided re: rationale of FEES vs MBSS due to s/s of aspiration at bedside, prolonged intubation.  Pt and  verbalized understanding, and are in agreement for FEES to be completed to further assess pharyngeal phase of swallow prior to diet upgrade.  Assessment: Pt not tolerating Pleasure feeds of thin liquids - downgraded to pleasure feeds of mildly (nectar) thick liquids. Pt is tolerating puree pleasure feeds with no clinical s/s of aspiration. Poor carryover of recommended compensatory strategies (pt not taking small, single sips via straw despite cues and edu)     Recommendations: SLP recommends to continue with pleasure feeds of IDDSI 4 Puree Solids; IDDSI 2 Mildly Thick (nectar) Liquids; Meds crushed in puree as able. Recommend FEES to further assess pharyngeal phase of swallow. RN made aware of recommendations.  Risk Management: upright for all intake, stay upright for at least 30 mins after intake, small bites/sips, assist feed, oral care q4 hrs to reduce adverse affects in the event of aspiration, increase physical mobility as able, alternate bites/sips, slow rate of intake, coordinate PO intake with Sinemet dosage, general GERD precautions, general aspiration precautions and hold PO and contact SLP if s/s of aspiration or worsening respiratory status develop.     Dysphagia Goals:  Timeframe for Long-term Goals: 7 days (6/25/22)  Goal 1: Pt will tolerate recommended diet w/o exhibiting overt s/s dysphagia.  06/29 - addressed and slowly progressing - see above      Short-term Goals  Timeframe for Short-term Goals: 5 days (6/23/22)  Goal 1: The patient will tolerate repeat bedside swallowing evaluation when able  06/29 - addressed and slowly progressing - see above     Goal 2: The patient will tolerate instrumental swallowing procedure  06/29 - addressed, FEES recommendation; order placed     Goal 3: The patient will tolerate recommended diet without observed clinical signs of aspiration  06/29 - addressed and slowly progressing - see above     Speech/Language/Cog Goals: N/A      Recommendations:  Solid Consistency: PLEASURE FEEDS ONLY IDDSI 4 Puree Solids  Liquid Consistency: PLEASURE FEEDS ONLY IDDSI 2 Mildly Thick (nectar) Liquids  Medication: Meds crushed in puree as able   *recommend FEES for further assessment of pharyngeal phase of swallow. Plan:    Continued Dysphagia treatment with goals per plan of care. Discharge Recommendations: TBD    If pt discharges from hospital prior to Speech/Swallowing discharge, this note serves as tx and discharge summary.      Total Treatment Time / Charges     Time in Time out Total Time / units   Cognitive Tx         Speech Tx      Dysphagia Tx 1402 1443 41 min / 1 unit     Signature:  Tram Rodriguez [place] [id number] [facility] #[id number]  Speech Language Pathologist

## 2022-06-29 NOTE — PROGRESS NOTES
bronchodilator orders with one order with TID Frequency and one order with Frequency of every 4 hours PRN wheezing or increased work of breathing using Per Protocol order mode. 11-13 - enter or revise RT Bronchodilator order(s) to one equivalent RT bronchodilator order with QID Frequency and an Albuterol order with Frequency of every 4 hours PRN wheezing or increased work of breathing using Per Protocol order mode. Greater than 13 - enter or revise RT Bronchodilator order(s) to one equivalent RT bronchodilator order with every 4 hours Frequency and an Albuterol order with Frequency of every 2 hours PRN wheezing or increased work of breathing using Per Protocol order mode. RT to enter RT Home Evaluation for COPD & MDI Assessment order using Per Protocol order mode.     Electronically signed by Shaunna Sales RCP on 6/29/2022 at 7:25 AM

## 2022-06-29 NOTE — PROGRESS NOTES
06/29/22 0254   NIV Type   Mode Bilevel   Mask Type Full face mask   Mask Size Medium   Settings/Measurements   IPAP 16 cmH20   CPAP/EPAP 8 cmH2O   Rate Ordered 14   Resp 23   FiO2  30 %   Vt Exhaled 572 mL   Comfort Level Good   SpO2 100

## 2022-06-29 NOTE — PLAN OF CARE
Problem: Discharge Planning  Goal: Discharge to home or other facility with appropriate resources  Outcome: Progressing  Flowsheets (Taken 6/28/2022 2000)  Discharge to home or other facility with appropriate resources: Identify barriers to discharge with patient and caregiver     Problem: Pain  Goal: Verbalizes/displays adequate comfort level or baseline comfort level  Outcome: Progressing  Flowsheets (Taken 6/28/2022 2000)  Verbalizes/displays adequate comfort level or baseline comfort level:   Encourage patient to monitor pain and request assistance   Assess pain using appropriate pain scale   Administer analgesics based on type and severity of pain and evaluate response   Implement non-pharmacological measures as appropriate and evaluate response     Problem: ABCDS Injury Assessment  Goal: Absence of physical injury  Outcome: Progressing     Problem: Skin/Tissue Integrity  Goal: Absence of new skin breakdown  Description: 1. Monitor for areas of redness and/or skin breakdown  2. Assess vascular access sites hourly  3. Every 4-6 hours minimum:  Change oxygen saturation probe site  4. Every 4-6 hours:  If on nasal continuous positive airway pressure, respiratory therapy assess nares and determine need for appliance change or resting period.   Outcome: Progressing     Problem: Chronic Conditions and Co-morbidities  Goal: Patient's chronic conditions and co-morbidity symptoms are monitored and maintained or improved  Outcome: Progressing  Flowsheets (Taken 6/28/2022 2000)  Care Plan - Patient's Chronic Conditions and Co-Morbidity Symptoms are Monitored and Maintained or Improved: Monitor and assess patient's chronic conditions and comorbid symptoms for stability, deterioration, or improvement     Problem: Safety - Adult  Goal: Free from fall injury  Outcome: Progressing     Problem: Nutrition Deficit:  Goal: Optimize nutritional status  Outcome: Progressing     Pt resting in bed, A&O x4, tolerating 1L kathe WARREN and FMS patent, reviewed with pt plan of care for shift and medications, all questions answered, pt voices no concerns.

## 2022-06-29 NOTE — PROGRESS NOTES
[facility] Progress Note           Subjective/   80y.o. year old female who we are seeing in consultation for FARHEEN/CKD.      HPI:  Patient has been weaned off oxygen, awaiting swallow evaluation  Patient extubated on 6/27  Received lasix on 6/25 and 6/28  Zimmerman catheter has been removed on 6/27  Creatinine 2.5-->2.7-->2.5-->2.8  Swelling present but improved    ROS:  +weak    SH: No visitor present    Objective/   GEN:  Chronically ill, BP (!) 160/69   Pulse 99   Temp 98.4 °F (36.9 °C) (Oral)   Resp 30   Ht 5' 6\" (1.676 m)   Wt 259 lb 7.7 oz (117.7 kg)   SpO2 93%   BMI 41.88 kg/m²   HEENT: non-icteric, no JVD  CV: S1, S2 without m/r/g; ++ LE edema  RESP: Decreased breath sounds  ABD: +bs, soft, nt, no hsm  SKIN: warm, no rashes    Data/  Recent Labs     06/27/22  0450 06/28/22  0448 06/29/22  0505   WBC 12.3* 15.9* 15.1*   HGB 8.4* 9.5* 9.9*   HCT 26.2* 30.3* 31.0*   MCV 86.1 87.0 86.2    353 361     Recent Labs     06/27/22  0450 06/28/22  0448 06/29/22  0505    140 141   K 3.7 3.9 3.7    107 106   CO2 19* 17* 19*   GLUCOSE 155* 104* 106*   PHOS 3.5 3.8 4.1   MG 2.10 2.10 2.10   BUN 59* 60* 56*   CREATININE 2.7* 2.5* 2.8*   LABGLOM 17* 19* 16*   GFRAA 21* 22* 20*     Component      Latest Ref Rng & Units 6/22/2022 6/22/2022           4:10 PM  4:10 PM   Color, UA      Straw/Yellow Yellow    Clarity, UA      Clear Clear    Glucose, UA      Negative mg/dL Negative    Bilirubin, Urine      Negative Negative    Ketones, Urine      Negative mg/dL TRACE (A)    Specific Gravity, UA      1.005 - 1.030 1.025    Blood, Urine      Negative Negative    pH, UA      5.0 - 8.0 5.5    Protein, UA      Negative mg/dL TRACE (A)    Urobilinogen, Urine      <2.0 E.U./dL 0.2    Nitrite, Urine      Negative Negative    Leukocyte Esterase, Urine      Negative Negative    Microscopic Examination       YES    Urine Type       NotGiven    Mucus, UA      None Seen /LPF Rare (A)    WBC, UA 0 - 5 /HPF 10-20 (A)    RBC, UA      0 - 4 /HPF 3-4    Epithelial Cells, UA      0 - 5 /HPF 0-1    Bacteria, UA      None Seen /HPF 2+ (A)    Sodium, Ur      Not Established mmol/L  <20   Potassium, Ur      Not Established mmol/L  26.3   Chloride      Not Established mmol/L  <20       Assessment/     - Acute kidney injury - multi-factorial ATN injury in setting of aspiration PNA, hypotension, & elevated vancomycin levels     - Chronic kidney disease stage 3b - background DM/HTN/CHF              SCr 1.6              Follows with [facility]     - Acute hypoxemic respiratory failure/Aspiration PNA/Pulmonary edema - on ventilator     - Diastolic CHF     - Atrial fibrillation    - Anemia - prn prbc's     - Hypertension    - Hypokalemia - prn replacement    -Metabolic acidosis    Plan/   -As needed Lasix, holding off on 6/29  - Monitoring off of ARB  -Serial renal panel  -daily wts and strict i/o  -renal dose medications   -avoid nephrotoxins    ____________________________________  Darcie Sotelo MD  [facility]  [email or web address]  Office: [phone]

## 2022-06-29 NOTE — PROGRESS NOTES
4900  Shift assessment, completed, see flow sheet. Pt is alert, pleasant and following commands. , /49 (64), SpO2 98% on 1L via NC. Respirations are easy, even, and unlabored. Bilateral lung sounds are diminished. Took PO meds crushed in pudding this AM w/o difficulty. PICC to LUE and PIV x1 remain patent with sites and dressings C/D/I, currently NS locked and capped. Glenard Magic both remain in place and patent. Call light within reach. Bed in lowest position. Bed alarm on. Will continue to monitor. 1400  Pt has transfer orders for 2W if/when bed is available. 1445  Speech therapy saw pt at bedside for another swallow eval. Updated diet orders:  -pureed pleasure foods with nectar thickened liquids. -potential swallow study tomorrow. 1630  Transfer of care report given to Norman Nixon, RN from PCU. Pt taken off of ICU monitoring at this time. All personal belongings bagged up to be sent with pt to room 317. Pt currently resting in stable condition in bed with eyes closed. Called spouse, Cathi Johnson, and updated him on pt's room change.

## 2022-06-29 NOTE — PROGRESS NOTES
Prevention  dressing applied to bridge of nose and other facial bony prominences upon BiPAP/CPAP initiation.

## 2022-06-29 NOTE — PROGRESS NOTES
Pulmonary & Critical Care Medicine ICU Progress Note    CC: shortness of breath     Events of Last 24 hours:       Vascular lines: IV:   6/19/2022 PICC    MV: 6/18/2022 -6/27/22  Vent Mode: AC/VC Resp Rate (Set): 0 bmp/Vt (Set, mL): 0 mL/ /FiO2 : 30 %  Recent Labs     06/27/22  0457 06/27/22  1150   PHART 7.318* 7.324*   [id number] 36.8 36.4   PO2ART 81.7 81.5       IV:   sodium chloride      sodium chloride         Vitals:  Blood pressure (!) 116/93, pulse (!) 112, temperature 98.5 °F (36.9 °C), temperature source Axillary, resp. rate 26, height 5' 6\" (1.676 m), weight 259 lb 7.7 oz (117.7 kg), SpO2 100 %, not currently breastfeeding. On 1L   Constitutional:  In no distress. Eyes: PERRL. Conjunctivae anicteric. ENT: Normal nose. Normal tongue. Neck:  Trachea is midline. No thyroid tenderness. Respiratory: + accessory muscle usage. decreased breath sounds. No wheezes. No rales. No Rhonchi. Cardiovascular: Normal S1S2. No digit clubbing. No digit cyanosis. +  LE edema. Gastrointestinal: No mass palpated. No tenderness palpated. No umbilical hernia. Skin: No rash on the exposed extremities. No Nodules or induration on exposed extremities. Psychiatric: No anxiety or Agitation. Alert and Oriented to person, place and time.       Scheduled Meds:   ipratropium-albuterol  1 ampule Inhalation Q4H WA    iron sucrose (VENOFER) iv piggyback 100 mL (Admin over 60 minutes)  100 mg IntraVENous Q24H    pantoprazole  40 mg IntraVENous BID    lidocaine 1 % injection  5 mL IntraDERmal Once    chlorhexidine  15 mL Mouth/Throat BID    sodium chloride flush  10 mL IntraVENous 2 times per day    apixaban  2.5 mg Oral BID    atorvastatin  40 mg Oral Nightly    carbidopa-levodopa  1 tablet Oral 4x Daily    DULoxetine  60 mg Oral Daily    budesonide-formoterol  2 puff Inhalation BID    lamoTRIgine  50 mg Oral BID    montelukast  10 mg Oral Daily    propafenone  150 mg Oral 3 times per day       Data:  CBC: Recent Labs     06/27/22  0450 06/28/22  0448 06/29/22  0505   WBC 12.3* 15.9* 15.1*   HGB 8.4* 9.5* 9.9*   HCT 26.2* 30.3* 31.0*   MCV 86.1 87.0 86.2    353 361     BMP:   Recent Labs     06/27/22  0450 06/28/22  0448 06/29/22  0505    140 141   K 3.7 3.9 3.7    107 106   CO2 19* 17* 19*   PHOS 3.5 3.8 4.1   BUN 59* 60* 56*   CREATININE 2.7* 2.5* 2.8*     LIVER PROFILE:   No results for input(s): AST, ALT, LIPASE, BILIDIR, BILITOT, ALKPHOS in the last 72 hours. Invalid input(s): AMYLASE,  ALB    Microbiology:  6/17/2022 SARS-CoV-2 and influenza are negative  6/17/2022 MRSA DNA screen positive  6/17/2022 blood NG  6/17/2022 sputum NRF  6/18/2022 tracheal aspirate NRF  6/19/2022 BAL Candida  6/19/2022 respiratory viral panel negative    Imaging:  CXR 6/27/2022   No significant interval change in bilateral airspace disease and small   effusions.  Unchanged positioning of support devices.        ACT 6/23/22   Small right and very small effusions, diverticulosis, nonspecific rectal wall thickening      ASSESSMENT:  · Acute hypoxemic respiratory failure  · Aspiration pneumonia  · BETSY on BiPAP 22/15 with 1 L bleed in   · Metabolic acidosis likely due to FARHEEN  · Parkinson's disease  · Atrial fibrillation  · Acute on chronic kidney failure, baseline creatinine 1.6  · Acute on chronic anemia, s/p 2 U PRBC 6/23/2022  · H/O esophagitis  · Pleural effusions are small bilaterally    PLAN:  · Bipap PRN and QHS  · Completed  Antibiotic day #10/10, cefepime & vancomycin  · Inhaled bronchodilators  · Nephrology following   · SLP/PT/OT  · Barium swallow pending  · Restart metoprolol at half dose  · Eliquis  · Protonix twice daily, Eliquis is held, GI has seen  · Prophylaxis: SCD, Bactroban, Protonix BID  · Ok to transfer to PCU

## 2022-06-29 NOTE — CARE COORDINATION
INTERDISCIPLINARY PLAN OF CARE CONFERENCE    Date/Time: 6/29/2022 8:51 AM  Completed by: Angel Phoenix MSW, LISW-S   Case Management    Patient Name:  Fadia Sauer  YOB: 1940  Admitting Diagnosis: Shortness of breath [R06.02]  Septicemia ([facility] [A41.9]  Chronic anemia [D64.9]  Acute respiratory failure with hypoxia ([facility] [J96.01]  Aspiration pneumonia of left lower lobe, unspecified aspiration pneumonia type ([facility] [J69.0]  Chronic kidney disease, unspecified CKD stage [N18.9]     Admit Date/Time:  6/17/2022 12:50 AM    Chart reviewed. Interdisciplinary team contacted or reviewed plan related to patient progress and discharge plans. Disciplines included Case Management, Nursing, and Dietitian. Current Status:ongoing   PT/OT recommendation for discharge plan of care: SNF    Expected D/C Disposition:  SNF  Confirmed plan with patient and/or family Yes confirmed with: (name) pt and pt's  at bedside    Discharge Plan Comments: Chart review completed. Completed Interdisciplinary rounds with ICU staff. Met with pt and her  at bedside. Discussed SNF recommendations and inquired what would be needed to get pt home if this is what they want. Both pt and her  stated pt needs rehab as she is weak prior to returning home and wanted a referral to the [facility]. They stated first opening on transportation. Pt's  stated if pt would need LTACH, they would want [facility] but wants the [facility]. SNF list given. They denied needs for CM at this time. Message left for Nazia at the [facility] requesting call back for the referral.    CM will continue to follow and assist. Please notify CM if needs or concerns arise.     Home O2 in place on admit: Yes    Addendum at 9:45am: Referral given to Nazia at the [facility] via phone call     Addendum at 11:38am:Recieved voicemail from [address] at the [facility] stating they are unable to meet pt's needs and can't accept     Updated pt and her  at bedside. They requested referrals to 1. [facility] 2. [facility] 3. [facility]) 4. [address]    Referral called to Geraldo Harrell at [facility] who stated she will review referral but unsure when she will have any open beds. Referral called to [facility] at [facility] at 3:16pm: received voicemail from [facility] at the [facility] stating they are still following pt but unable to make a determination at this time and need clarification on if pt is a getting a trach.  Voicemail left for [facility] notifying no trach at this time    At 3:52pm: left message for Geraldo Harrell at [facility]

## 2022-06-30 LAB
ANION GAP SERPL CALCULATED.3IONS-SCNC: 13 MMOL/L (ref 3–16)
BASOPHILS ABSOLUTE: 0.1 K/UL (ref 0–0.2)
BASOPHILS RELATIVE PERCENT: 0.4 %
BUN BLDV-MCNC: 54 MG/DL (ref 7–20)
CALCIUM SERPL-MCNC: 9.7 MG/DL (ref 8.3–10.6)
CHLORIDE BLD-SCNC: 111 MMOL/L (ref 99–110)
CO2: 22 MMOL/L (ref 21–32)
CREAT SERPL-MCNC: 2.5 MG/DL (ref 0.6–1.2)
EOSINOPHILS ABSOLUTE: 0.3 K/UL (ref 0–0.6)
EOSINOPHILS RELATIVE PERCENT: 2.3 %
GFR AFRICAN AMERICAN: 22
GFR NON-AFRICAN AMERICAN: 19
GLUCOSE BLD-MCNC: 146 MG/DL (ref 70–99)
HCT VFR BLD CALC: 31.2 % (ref 36–48)
HEMOGLOBIN: 10.1 G/DL (ref 12–16)
LYMPHOCYTES ABSOLUTE: 1.1 K/UL (ref 1–5.1)
LYMPHOCYTES RELATIVE PERCENT: 7.7 %
MAGNESIUM: 2 MG/DL (ref 1.8–2.4)
MCH RBC QN AUTO: 27.8 PG (ref 26–34)
MCHC RBC AUTO-ENTMCNC: 32.4 G/DL (ref 31–36)
MCV RBC AUTO: 85.9 FL (ref 80–100)
MONOCYTES ABSOLUTE: 1.1 K/UL (ref 0–1.3)
MONOCYTES RELATIVE PERCENT: 7.4 %
NEUTROPHILS ABSOLUTE: 11.9 K/UL (ref 1.7–7.7)
NEUTROPHILS RELATIVE PERCENT: 82.2 %
PDW BLD-RTO: 14.6 % (ref 12.4–15.4)
PHOSPHORUS: 3.5 MG/DL (ref 2.5–4.9)
PLATELET # BLD: 379 K/UL (ref 135–450)
PMV BLD AUTO: 7.6 FL (ref 5–10.5)
POTASSIUM SERPL-SCNC: 3.5 MMOL/L (ref 3.5–5.1)
RBC # BLD: 3.63 M/UL (ref 4–5.2)
SODIUM BLD-SCNC: 146 MMOL/L (ref 136–145)
WBC # BLD: 14.4 K/UL (ref 4–11)

## 2022-06-30 PROCEDURE — 97530 THERAPEUTIC ACTIVITIES: CPT

## 2022-06-30 PROCEDURE — 2700000000 HC OXYGEN THERAPY PER DAY

## 2022-06-30 PROCEDURE — 85025 COMPLETE CBC W/AUTO DIFF WBC: CPT

## 2022-06-30 PROCEDURE — 92612 ENDOSCOPY SWALLOW (FEES) VID: CPT

## 2022-06-30 PROCEDURE — 6370000000 HC RX 637 (ALT 250 FOR IP): Performed by: INTERNAL MEDICINE

## 2022-06-30 PROCEDURE — 2580000003 HC RX 258: Performed by: INTERNAL MEDICINE

## 2022-06-30 PROCEDURE — 83735 ASSAY OF MAGNESIUM: CPT

## 2022-06-30 PROCEDURE — C9113 INJ PANTOPRAZOLE SODIUM, VIA: HCPCS | Performed by: INTERNAL MEDICINE

## 2022-06-30 PROCEDURE — 1200000000 HC SEMI PRIVATE

## 2022-06-30 PROCEDURE — 94761 N-INVAS EAR/PLS OXIMETRY MLT: CPT

## 2022-06-30 PROCEDURE — 92526 ORAL FUNCTION THERAPY: CPT

## 2022-06-30 PROCEDURE — 6360000002 HC RX W HCPCS: Performed by: INTERNAL MEDICINE

## 2022-06-30 PROCEDURE — 97110 THERAPEUTIC EXERCISES: CPT

## 2022-06-30 PROCEDURE — 97535 SELF CARE MNGMENT TRAINING: CPT

## 2022-06-30 PROCEDURE — 94640 AIRWAY INHALATION TREATMENT: CPT

## 2022-06-30 PROCEDURE — 80048 BASIC METABOLIC PNL TOTAL CA: CPT

## 2022-06-30 PROCEDURE — 99233 SBSQ HOSP IP/OBS HIGH 50: CPT | Performed by: INTERNAL MEDICINE

## 2022-06-30 PROCEDURE — 84100 ASSAY OF PHOSPHORUS: CPT

## 2022-06-30 RX ORDER — DEXTROSE MONOHYDRATE 50 MG/ML
INJECTION, SOLUTION INTRAVENOUS CONTINUOUS
Status: DISCONTINUED | OUTPATIENT
Start: 2022-06-30 | End: 2022-07-05

## 2022-06-30 RX ADMIN — CARBIDOPA AND LEVODOPA 1 TABLET: 25; 100 TABLET ORAL at 20:46

## 2022-06-30 RX ADMIN — PROPAFENONE HYDROCHLORIDE 150 MG: 150 TABLET, FILM COATED ORAL at 05:33

## 2022-06-30 RX ADMIN — SODIUM CHLORIDE, PRESERVATIVE FREE 10 ML: 5 INJECTION INTRAVENOUS at 08:17

## 2022-06-30 RX ADMIN — METOPROLOL TARTRATE 25 MG: 25 TABLET, FILM COATED ORAL at 08:17

## 2022-06-30 RX ADMIN — PANTOPRAZOLE SODIUM 40 MG: 40 INJECTION, POWDER, LYOPHILIZED, FOR SOLUTION INTRAVENOUS at 20:46

## 2022-06-30 RX ADMIN — CARBIDOPA AND LEVODOPA 1 TABLET: 25; 100 TABLET ORAL at 17:28

## 2022-06-30 RX ADMIN — ATORVASTATIN CALCIUM 40 MG: 40 TABLET, FILM COATED ORAL at 20:47

## 2022-06-30 RX ADMIN — ALTEPLASE 1 MG: 2.2 INJECTION, POWDER, LYOPHILIZED, FOR SOLUTION INTRAVENOUS at 11:57

## 2022-06-30 RX ADMIN — ALTEPLASE 1 MG: 2.2 INJECTION, POWDER, LYOPHILIZED, FOR SOLUTION INTRAVENOUS at 09:47

## 2022-06-30 RX ADMIN — METOPROLOL TARTRATE 25 MG: 25 TABLET, FILM COATED ORAL at 20:46

## 2022-06-30 RX ADMIN — Medication 2 PUFF: at 07:43

## 2022-06-30 RX ADMIN — SODIUM CHLORIDE, PRESERVATIVE FREE 10 ML: 5 INJECTION INTRAVENOUS at 20:47

## 2022-06-30 RX ADMIN — IPRATROPIUM BROMIDE AND ALBUTEROL SULFATE 1 AMPULE: 2.5; .5 SOLUTION RESPIRATORY (INHALATION) at 19:47

## 2022-06-30 RX ADMIN — DULOXETINE HYDROCHLORIDE 60 MG: 60 CAPSULE, DELAYED RELEASE ORAL at 08:16

## 2022-06-30 RX ADMIN — PANTOPRAZOLE SODIUM 40 MG: 40 INJECTION, POWDER, LYOPHILIZED, FOR SOLUTION INTRAVENOUS at 08:17

## 2022-06-30 RX ADMIN — APIXABAN 2.5 MG: 5 TABLET, FILM COATED ORAL at 20:46

## 2022-06-30 RX ADMIN — IRON SUCROSE 100 MG: 20 INJECTION, SOLUTION INTRAVENOUS at 10:42

## 2022-06-30 RX ADMIN — LAMOTRIGINE 50 MG: 25 TABLET ORAL at 20:46

## 2022-06-30 RX ADMIN — Medication 15 ML: at 20:46

## 2022-06-30 RX ADMIN — IPRATROPIUM BROMIDE AND ALBUTEROL SULFATE 1 AMPULE: 2.5; .5 SOLUTION RESPIRATORY (INHALATION) at 23:16

## 2022-06-30 RX ADMIN — IPRATROPIUM BROMIDE AND ALBUTEROL SULFATE 1 AMPULE: 2.5; .5 SOLUTION RESPIRATORY (INHALATION) at 11:21

## 2022-06-30 RX ADMIN — CARBIDOPA AND LEVODOPA 1 TABLET: 25; 100 TABLET ORAL at 08:16

## 2022-06-30 RX ADMIN — Medication 2 PUFF: at 19:47

## 2022-06-30 RX ADMIN — PROPAFENONE HYDROCHLORIDE 150 MG: 150 TABLET, FILM COATED ORAL at 15:13

## 2022-06-30 RX ADMIN — DEXTROSE MONOHYDRATE: 50 INJECTION, SOLUTION INTRAVENOUS at 15:16

## 2022-06-30 RX ADMIN — LAMOTRIGINE 50 MG: 25 TABLET ORAL at 08:17

## 2022-06-30 RX ADMIN — PROPAFENONE HYDROCHLORIDE 150 MG: 150 TABLET, FILM COATED ORAL at 20:46

## 2022-06-30 RX ADMIN — IPRATROPIUM BROMIDE AND ALBUTEROL SULFATE 1 AMPULE: 2.5; .5 SOLUTION RESPIRATORY (INHALATION) at 07:43

## 2022-06-30 RX ADMIN — CARBIDOPA AND LEVODOPA 1 TABLET: 25; 100 TABLET ORAL at 12:21

## 2022-06-30 RX ADMIN — APIXABAN 2.5 MG: 5 TABLET, FILM COATED ORAL at 08:16

## 2022-06-30 RX ADMIN — Medication 15 ML: at 08:17

## 2022-06-30 RX ADMIN — MONTELUKAST SODIUM 10 MG: 10 TABLET, COATED ORAL at 08:16

## 2022-06-30 ASSESSMENT — PAIN SCALES - GENERAL: PAINLEVEL_OUTOF10: 0

## 2022-06-30 NOTE — PROGRESS NOTES
RT Inhaler-Nebulizer Bronchodilator Protocol Note    There is a bronchodilator order in the chart from a provider indicating to follow the RT Bronchodilator Protocol and there is an Initiate RT Inhaler-Nebulizer Bronchodilator Protocol order as well (see protocol at bottom of note). CXR Findings:  No results found. The findings from the last RT Protocol Assessment were as follows:   History Pulmonary Disease: Chronic pulmonary disease  Respiratory Pattern: Dyspnea on exertion or RR 21-25 bpm  Breath Sounds: Inspiratory and expiratory or bilateral wheezing and/or rhonchi  Cough: Strong, productive  Indication for Bronchodilator Therapy: On home bronchodilators  Bronchodilator Assessment Score: 11    Aerosolized bronchodilator medication orders have been revised according to the RT Inhaler-Nebulizer Bronchodilator Protocol below. Respiratory Therapist to perform RT Therapy Protocol Assessment initially then follow the protocol. Repeat RT Therapy Protocol Assessment PRN for score 0-3 or on second treatment, BID, and PRN for scores above 3. No Indications - adjust the frequency to every 6 hours PRN wheezing or bronchospasm, if no treatments needed after 48 hours then discontinue using Per Protocol order mode. If indication present, adjust the RT bronchodilator orders based on the Bronchodilator Assessment Score as indicated below. Use Inhaler orders unless patient has one or more of the following: on home nebulizer, not able to hold breath for 10 seconds, is not alert and oriented, cannot activate and use MDI correctly, or respiratory rate 25 breaths per minute or more, then use the equivalent nebulizer order(s) with same Frequency and PRN reasons based on the score. If a patient is on this medication at home then do not decrease Frequency below that used at home.     0-3 - enter or revise RT bronchodilator order(s) to equivalent RT Bronchodilator order with Frequency of every 4 hours PRN for wheezing or increased work of breathing using Per Protocol order mode. 4-6 - enter or revise RT Bronchodilator order(s) to two equivalent RT bronchodilator orders with one order with BID Frequency and one order with Frequency of every 4 hours PRN wheezing or increased work of breathing using Per Protocol order mode. 7-10 - enter or revise RT Bronchodilator order(s) to two equivalent RT bronchodilator orders with one order with TID Frequency and one order with Frequency of every 4 hours PRN wheezing or increased work of breathing using Per Protocol order mode. 11-13 - enter or revise RT Bronchodilator order(s) to one equivalent RT bronchodilator order with QID Frequency and an Albuterol order with Frequency of every 4 hours PRN wheezing or increased work of breathing using Per Protocol order mode. Greater than 13 - enter or revise RT Bronchodilator order(s) to one equivalent RT bronchodilator order with every 4 hours Frequency and an Albuterol order with Frequency of every 2 hours PRN wheezing or increased work of breathing using Per Protocol order mode.          Electronically signed by Krysten Wilkins RCP on 6/30/2022 at 7:50 PM

## 2022-06-30 NOTE — PROGRESS NOTES
[facility] Progress Note           Subjective/   80y.o. year old female who we are seeing in consultation for FARHEEN/CKD.      HPI:  Patient has been weaned off oxygen,failed swallow evaluation  Patient extubated on 6/27  Received lasix on 6/25 and 6/28  Zimmerman catheter has been removed on 6/27  Creatinine 2.5-->2.7-->2.5-->2.8-->2.5  Swelling present but improved    ROS:  +weak but getting better    SH:  visitor present    Objective/   GEN:  Chronically ill, BP (!) 167/64   Pulse 94   Temp 98.4 °F (36.9 °C) (Axillary)   Resp 24   Ht 5' 6\" (1.676 m)   Wt 259 lb 7.7 oz (117.7 kg)   SpO2 94%   BMI 41.88 kg/m²   HEENT: non-icteric, no JVD  CV: S1, S2 without m/r/g; ++ LE edema  RESP: Decreased breath sounds  ABD: +bs, soft, nt, no hsm  SKIN: warm, no rashes    Data/  Recent Labs     06/28/22  0448 06/29/22  0505 06/30/22  1032   WBC 15.9* 15.1* 14.4*   HGB 9.5* 9.9* 10.1*   HCT 30.3* 31.0* 31.2*   MCV 87.0 86.2 85.9    361 379     Recent Labs     06/28/22  0448 06/29/22  0505 06/30/22  1032    141 146*   K 3.9 3.7 3.5    106 111*   CO2 17* 19* 22   GLUCOSE 104* 106* 146*   PHOS 3.8 4.1 3.5   MG 2.10 2.10 2.00   BUN 60* 56* 54*   CREATININE 2.5* 2.8* 2.5*   LABGLOM 19* 16* 19*   GFRAA 22* 20* 22*     Component      Latest Ref Rng & Units 6/22/2022 6/22/2022           4:10 PM  4:10 PM   Color, UA      Straw/Yellow Yellow    Clarity, UA      Clear Clear    Glucose, UA      Negative mg/dL Negative    Bilirubin, Urine      Negative Negative    Ketones, Urine      Negative mg/dL TRACE (A)    Specific Gravity, UA      1.005 - 1.030 1.025    Blood, Urine      Negative Negative    pH, UA      5.0 - 8.0 5.5    Protein, UA      Negative mg/dL TRACE (A)    Urobilinogen, Urine      <2.0 E.U./dL 0.2    Nitrite, Urine      Negative Negative    Leukocyte Esterase, Urine      Negative Negative    Microscopic Examination       YES    Urine Type       NotGiven    Mucus, UA      None Seen /LPF Rare (A)    WBC, UA      0 - 5 /HPF 10-20 (A)    RBC, UA      0 - 4 /HPF 3-4    Epithelial Cells, UA      0 - 5 /HPF 0-1    Bacteria, UA      None Seen /HPF 2+ (A)    Sodium, Ur      Not Established mmol/L  <20   Potassium, Ur      Not Established mmol/L  26.3   Chloride      Not Established mmol/L  <20       Assessment/     - Acute kidney injury - multi-factorial ATN injury in setting of aspiration PNA, hypotension, & elevated vancomycin levels     - Chronic kidney disease stage 3b - background DM/HTN/CHF              SCr 1.6              Follows with [facility]     - Acute hypoxemic respiratory failure/Aspiration PNA/Pulmonary edema - on ventilator     - Diastolic CHF     - Atrial fibrillation    - Anemia - prn prbc's     - Hypertension    - Hypokalemia - prn replacement    -Metabolic acidosis    Plan/   -D5W at 50 ml/h   - Monitoring off of ARB  -Serial renal panel  -daily wts and strict i/o  -renal dose medications   -avoid nephrotoxins    ____________________________________  Tory Iqbal MD  [facility]  www[facility]  Office: [phone]

## 2022-06-30 NOTE — PROGRESS NOTES
White lumen of PICC flushed and blood return noted. Additional dose of cathflo requested for pink lumen of PICC.

## 2022-06-30 NOTE — PROGRESS NOTES
bronchodilator orders with one order with TID Frequency and one order with Frequency of every 4 hours PRN wheezing or increased work of breathing using Per Protocol order mode. 11-13 - enter or revise RT Bronchodilator order(s) to one equivalent RT bronchodilator order with QID Frequency and an Albuterol order with Frequency of every 4 hours PRN wheezing or increased work of breathing using Per Protocol order mode. Greater than 13 - enter or revise RT Bronchodilator order(s) to one equivalent RT bronchodilator order with every 4 hours Frequency and an Albuterol order with Frequency of every 2 hours PRN wheezing or increased work of breathing using Per Protocol order mode. RT to enter RT Home Evaluation for COPD & MDI Assessment order using Per Protocol order mode.     Electronically signed by Alan Leach RCP on 6/30/2022 at 7:48 AM

## 2022-06-30 NOTE — PROGRESS NOTES
MBS Brief    Name: Isamar Woodson  : 1940  Medical Diagnosis: Shortness of breath [R06.02]  Septicemia ([address] [A41.9]  Chronic anemia [D64.9]  Acute respiratory failure with hypoxia (HCC) [J96.01]  Aspiration pneumonia of left lower lobe, unspecified aspiration pneumonia type ([address] [J69.0]  Chronic kidney disease, unspecified CKD stage [N18.9]      FEES completed at this time. Preliminary rec's include Mildly thick (nectar) liquids, advance to soft and bite sized solids, meds crushed in puree as able. Formal report to follow pending [id number] review of study. Call with questions or concerns.  Thank you,          Ilia Berman M.A., [address] [place] [address]  Speech-Language Pathologist  Phone: [phone]

## 2022-06-30 NOTE — PROGRESS NOTES
Administration of cathflo into pink PICC line successful, line flushing and drawing back. Notified MD Laboy of patient still having liquid stool, not tested for cdiff while in icu. Was cdiff positive on her last admission.

## 2022-06-30 NOTE — PROGRESS NOTES
Occupational Therapy/ Physical Therapy  Attempted OT/PT treatment and Speech Therapy needed to complete the Fees Assessment at this time. Will attempt again later as time allows.   Anthony Lynn OTR/L [id number]  Kimmie Cole, PT, DPT

## 2022-06-30 NOTE — CARE COORDINATION
INTERDISCIPLINARY PLAN OF CARE CONFERENCE    Date/Time: 6/30/2022 2:41 PM  Completed by: Hardeep Gallego RN, Case Management      Patient Name:  Alyssia Morris  YOB: 1940  Admitting Diagnosis: Shortness of breath [R06.02]  Septicemia ([facility] [A41.9]  Chronic anemia [D64.9]  Acute respiratory failure with hypoxia ([facility] [J96.01]  Aspiration pneumonia of left lower lobe, unspecified aspiration pneumonia type ([facility] [J69.0]  Chronic kidney disease, unspecified CKD stage [N18.9]     Admit Date/Time:  6/17/2022 12:50 AM    Chart reviewed. Interdisciplinary team contacted or reviewed plan related to patient progress and discharge plans. Disciplines included Case Management, Nursing, and Dietitian. Current Status:ongoing; on RA; continues to struggle with secretions    PT/OT recommendation for discharge plan of care: SNF    Expected D/C Disposition:  Skilled nursing facility  Discharge Plan Comments: Chart reviewed. Plan continues for STR and per notes pt would like (1)[facility] and (2)[facility]. Per notes Dara Pantoja at [facility]  following at this time and to see how pt progresses before accepting. Spoke with Tatiana Doherty from [facility] and she states she will follow pt and determine if they can meet her needs as pt progresses.      Home O2 in place on admit: ECF-pt on RA

## 2022-06-30 NOTE — PROGRESS NOTES
Hospitalist PCU Progress Note      PCP: Segundo Gerard MD    Date of Admission: 6/17/2022    Subjective: admitted for resp failure from CHF , pna, eventually intubated    6/28-patient was extubated at 1:57 PM on 6/27/2022. She was then placed on BiPAP. She is getting periodic breaks from BiPAP. This morning she is on BiPAP and able to follow commands. Speech therapy is going to see the patient. O2 daytime and BiPAP HS. Transferred out of ICU 6/29    Prolonged intubated - there were discussions about trach and PEG support, though able to extubate eventually. She is very weak, but awake and conversant. SLP at bedside       at bedside. Ongoing deep bronchial productive cough.          Medications:  Reviewed    Infusion Medications    sodium chloride      sodium chloride       Scheduled Medications    metoprolol tartrate  25 mg Oral BID    ipratropium-albuterol  1 ampule Inhalation Q4H WA    iron sucrose (VENOFER) iv piggyback 100 mL (Admin over 60 minutes)  100 mg IntraVENous Q24H    pantoprazole  40 mg IntraVENous BID    lidocaine 1 % injection  5 mL IntraDERmal Once    chlorhexidine  15 mL Mouth/Throat BID    sodium chloride flush  10 mL IntraVENous 2 times per day    apixaban  2.5 mg Oral BID    atorvastatin  40 mg Oral Nightly    carbidopa-levodopa  1 tablet Oral 4x Daily    DULoxetine  60 mg Oral Daily    budesonide-formoterol  2 puff Inhalation BID    lamoTRIgine  50 mg Oral BID    montelukast  10 mg Oral Daily    propafenone  150 mg Oral 3 times per day     PRN Meds: carboxymethylcellulose PF, sodium chloride (Inhalant), sodium chloride, sodium chloride flush, sodium chloride, promethazine **OR** ondansetron, acetaminophen **OR** acetaminophen, albuterol      Intake/Output Summary (Last 24 hours) at 6/30/2022 1222  Last data filed at 6/30/2022 0915  Gross per 24 hour   Intake 160 ml   Output 1550 ml   Net -1390 ml       Physical Exam Performed:    BP (!) 167/64   Pulse 94   Temp 98.4 °F (36.9 °C) (Axillary)   Resp 24   Ht 5' 6\" (1.676 m)   Wt 259 lb 7.7 oz (117.7 kg)   SpO2 94%   BMI 41.88 kg/m²        General Appearance: Elderly white female conversant, weak, not in acute distress. Mucous Membranes:  Pink , anicteric  Neck: No JVD, no carotid bruit, no thyromegaly  Chest: no accessory muscle use. Coarse upper airwai sounds with rhonchi. Cardiovascular:  RRR S1S2 heard, no murmurs or gallops  Abdomen:  Soft, obese,  undistended, non tender, no organomegaly, BS present  Extremities: diffuse wesly 2 + edema to both LE now essentially resolved. 2+ pitting edema RUE persisting. Distal pulses well felt  Neurological : Awake and alert. Moves all 4 extremities. Labs:   Recent Labs     06/28/22  0448 06/29/22  0505 06/30/22  1032   WBC 15.9* 15.1* 14.4*   HGB 9.5* 9.9* 10.1*   HCT 30.3* 31.0* 31.2*    361 379     Recent Labs     06/28/22  0448 06/29/22  0505 06/30/22  1032    141 146*   K 3.9 3.7 3.5    106 111*   CO2 17* 19* 22   BUN 60* 56* 54*   CREATININE 2.5* 2.8* 2.5*   CALCIUM 8.7 9.3 9.7   PHOS 3.8 4.1 3.5     No results for input(s): AST, ALT, BILIDIR, BILITOT, ALKPHOS in the last 72 hours. No results for input(s): INR in the last 72 hours. No results for input(s): Rhae Childes in the last 72 hours. Urinalysis:      Lab Results   Component Value Date/Time    NITRU Negative 06/22/2022 04:10 PM    WBCUA 10-20 06/22/2022 04:10 PM    BACTERIA 2+ 06/22/2022 04:10 PM    RBCUA 3-4 06/22/2022 04:10 PM    BLOODU Negative 06/22/2022 04:10 PM    SPECGRAV 1.025 06/22/2022 04:10 PM    GLUCOSEU Negative 06/22/2022 04:10 PM       Radiology:  XR CHEST PORTABLE   Final Result   No significant interval change in bilateral airspace disease and small   effusions. Unchanged positioning of support devices. XR CHEST PORTABLE   Final Result   Increasing basilar opacities with probable small effusions.       Unchanged positioning of support devices. XR CHEST PORTABLE   Final Result   1. No significant change. XR CHEST PORTABLE   Final Result   No significant interval change in basilar predominant airspace disease and   small left greater than right pleural effusions. Pulmonary vascular   congestion. CT ABDOMEN PELVIS WO CONTRAST Additional Contrast? None   Final Result   Colonic diverticulosis without acute diverticulitis. Nonspecific rectal wall   thickening. Partially visualized pleural effusions with adjacent compressive   atelectasis. XR CHEST PORTABLE   Final Result   Again identified is bibasilar airspace disease with small pleural effusions   and mild increased right perihilar infiltrates. This may be related to   atelectasis or pneumonia. Cardiomegaly. XR CHEST PORTABLE   Final Result   Stable examination with bilateral small effusions and bibasilar atelectasis. Stable ET tube and enteric tube. XR CHEST PORTABLE   Final Result   No acute process. Bibasilar hypoaeration      Stable cardiomegaly         IR PICC WO SQ PORT/PUMP > 5 YEARS   Final Result      XR CHEST PORTABLE   Final Result   Supportive tubing projects in stable position. Partial regression of atelectasis in the right lung base. Persisting   consolidation or or atelectasis in the left lung base. XR CHEST PORTABLE   Final Result   1. Endotracheal tube terminates in appropriate position above the nirali. 2.  Enteric tube terminates in the body of the stomach. 3.  Basilar opacities again demonstrated, right greater than left. Interval   improved aeration of the left lung base. XR CHEST PORTABLE   Final Result   Increasing opacity in the right base, lobar atelectasis versus accumulating   right pleural effusion. Combination of both entities or underlying pneumonia   also possible. Persisting left basilar airspace disease and small pleural effusion.          XR CHEST PORTABLE   Final Result   Hazy bilateral lobe airspace opacities and small left pleural effusion. This   could represent atelectasis or pneumonia in the appropriate clinical setting. CT CHEST WO CONTRAST   Final Result   1. Respiratory motion in the lower lungs. The right base has linear and   dependent atelectasis. The left does have atelectatic changes but could also   have some patchy opacities from pneumonia or aspiration at the left base. 2.  There is no pleural effusion or pneumothorax. 3.  Narrowing of the AP diameter of the trachea with bulging of the   membranous portion due to the gas distended but nondilated esophagus. XR CHEST PORTABLE   Final Result   1. Bibasilar heterogeneous opacities may represent subsegmental atelectasis   and/or mild pulmonary edema. Underlying infection is difficult to exclude. Short-term follow-up PA and lateral chest radiographs may be helpful for   further evaluation. 2.  Cardiomegaly. Assessment/Plan:    Principal Problem:    Acute respiratory failure with hypoxia (HCC)  Active Problems:    Shortness of breath    Aspiration pneumonia of left lower lobe (HCC)    Mild protein-calorie malnutrition (HCC)    Chronic anemia    Parkinson disease (HCC)    Septicemia (HCC)    Chronic kidney disease  Resolved Problems:    * No resolved hospital problems. *           Acute Hypox Resp Failure resolving   Aspiration PNA ongoing. Oxygen requirements increased rapidly from 2l/min to NRB to vapotherm  Continue IV Zosyn and vancomycin. Change Zosyn to cefepime. DC vancomycin 6/22. Finished 10 days of Vanco and cefepime  Intubated 6/18. Extubated on 6/27/2022.  -On BiPAP. Alternate BiPAP with nasal cannula oxygen.    - SLP involved.        Sepsis POA - due to above. resolved        Dysphagia. Abnormal CT  Cough with every meal intake per family report. Underlying Parkinson's.    I ordered for MBS and esophagram, though these will unlikely be done till she is better clinically. SLP at bedside today. - on modified diet. - continues to struggle with secretions. Melena  GI bleed   drop in H/H   GI consulted   H&H held stable. Has since resumed eliquis  IV PPI BID        FARHEEN on CKD IIIB  Cr slightly  better then baseline actually on admission  Lasix 40 mg IV x2 6/21-with poor response-->lasix gtt  Worsening creatinine. Consulted nephrology. Dc lasix gtt.        Parkinson's disease  on Sinemet.         Afib Paroxysmal.   Rythmol   Resume Eliquis. This was held after she had bloody secretions during bronch, but has since been resumed.         HTN - BP on the low side. Nifedipine stopped due to this and also leg edema. Consider alternative when restarting.          DVT Prophylaxis: Eliquis  eliquis held for bloody secretions seen during bronch and melena  Diet: ADULT DIET; Dysphagia - Pureed; Mildly Thick (Nectar)  Code Status: Full Code    Cc. Prognosis guarded. Still struggling with secretions.        Pedro Rangel MD 6/30/2022 12:22 PM

## 2022-06-30 NOTE — PROGRESS NOTES
[id number] [facility]  SPEECH PATHOLOGY  Fiberoptic Endoscopic Evaluation of Swallowing  (FEES)    RECOMMENDATIONS: I recommend the pt receive oral intake of nectar thick liquids and soft and bite-sized solids with medications crushed in purees as able.     Robby Ervin  : 1940 (80 y.o.)   MRN: [id number]  ROOM: [place] [address]  ADMISSION DATE: 2022  PATIENT DIAGNOSIS(ES): Shortness of breath [R06.02]  Septicemia ([address] [A41.9]  Chronic anemia [D64.9]  Acute respiratory failure with hypoxia (HCC) [J96.01]  Aspiration pneumonia of left lower lobe, unspecified aspiration pneumonia type ([address] [J69.0]  Chronic kidney disease, unspecified CKD stage [N18.9]  Chief Complaint   Patient presents with    Shortness of Breath     Patient Active Problem List    Diagnosis Date Noted    Chronic anemia     Mild protein-calorie malnutrition ([address] 2022    Shortness of breath 2022    Aspiration pneumonia of left lower lobe (HCC)     Sensorineural hearing loss, bilateral 2022    Laceration of left lower extremity     Pulmonary nodules     C. difficile colitis 10/07/2021    Chest pain 2021    Pulmonary HTN ([address]     Mitral valve insufficiency     Sialadenitis     Colonic pseudoobstruction     Chronic kidney disease 2020    Partial symptomatic epilepsy with complex partial seizures, not intractable, without status epilepticus ([address]     Diastolic congestive heart failure ([address] 2019    Acute respiratory failure with hypoxia (HCC)     COPD (chronic obstructive pulmonary disease) ([address] 12/10/2018    PAF (paroxysmal atrial fibrillation) ([address]     Septicemia ([address] 2018    Severe obstructive sleep apnea     Hepatic steatosis     Diverticulosis     Blind left eye     Parkinson disease ([address] 2018    CAD (coronary artery disease)     Morbid obesity with BMI of 40.0-44.9, adult ([address] 02/10/2017    Type 2 diabetes mellitus with stage 4 chronic kidney disease, without long-term current use of insulin ([address] 02/10/2017    Moderate persistent asthma 08/09/2016    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-sided sciatica 08/09/2016    Hypothyroidism 01/27/2015    Postural dizziness 02/20/2013    Anemia of chronic disease     GERD (gastroesophageal reflux disease)     Essential hypertension     Hyperlipidemia     Restless leg syndrome     L carotid artery stenosis s/p CEA 01/01/2002     Past Medical History:   Diagnosis Date    Anemia, unspecified 2010    neg bone marrow    Asthma     Atrial fibrillation ([address]     Baker's cyst     Basal cell carcinoma of left cheek 3/12/2019    Blind left eye     Carotid artery stenoses     Carotid stenosis 2002    left    Cervical spinal cord compression ([address] 11/2010    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-sided sciatica 8/9/2016    CKD (chronic kidney disease) stage 3, GFR 30-59 ml/min ([facility])     Community acquired pneumonia of left lower lobe of lung 9/8/2018    CRI     Detached retina, left     Diverticulosis     DJD (degenerative joint disease)     Edema     chronic    Fatty liver     GERD (gastroesophageal reflux disease)     Hearing loss     Herniated lumbar intervertebral disc     Hx stage 2 sacral decubitus ulcer 4/13/2018    Hyperlipidemia     Hypertension     Hypothyroid 1/27/2015    Morbid obesity with BMI of 40.0-44.9, adult ([address] 2/10/2017    BETSY treated with BiPAP     Parkinson's disease ([address]     Partial symptomatic epilepsy with complex partial seizures, not intractable, without status epilepticus ([address] 1/24/2020    Restless leg syndrome     Restless legs syndrome     Rheumatic fever 1950    2x IN [place]    Sleep apnea     Tremor, essential 8/16/2010    Type 2 diabetes mellitus without complication ([facility])     Type II or unspecified type diabetes mellitus without mention of complication, not stated as uncontrolled      Past Surgical History:   Procedure Laterality Date    APPENDECTOMY      CAROTID ENDARTERECTOMY      left    CATARACT REMOVAL      CERVICAL FUSION  11/2010    CHOLECYSTECTOMY      COLONOSCOPY N/A 3/8/2020    COLONOSCOPY FLEXIBLE W/ DECOMPRESSION [facility] [place]      HERNIA REPAIR      HYSTERECTOMY (CERVIX STATUS UNKNOWN)      IR MIDLINE CATH  10/8/2021    IR MIDLINE CATH 10/8/2021 [facility] SPECIAL PROCEDURES    JOINT REPLACEMENT Bilateral 2003    KNEE SURGERY      replacement x 2    OVARY REMOVAL      SHOULDER SURGERY      TONSILLECTOMY      TUBAL LIGATION      UPPER GASTROINTESTINAL ENDOSCOPY  03/17/2020    Esophagitis    UPPER GASTROINTESTINAL ENDOSCOPY N/A 3/17/2020    EGD performed by Ray Cardenas MD at [facility] VITRECTOMY       Allergies   Allergen Reactions    Levaquin [Levofloxacin]      Pt was given in er and developed hives and redness    Metoclopramide     Phenazopyridine     Pyridium [Phenazopyridine Hcl]     Reglan [Metoclopramide Hcl]     Reglan [Metoclopramide Hcl]      tremors       DATE ONSET: 06/17/2022    Date of Evaluation: 6/30/2022   Evaluating Therapist: Blanca Rothman LOCATION: Pt seen in room at bedside. CASE HISTORY: Medical record review/interview: Per MD H&P: \"The patient is a 78 y.o. female Hx of Parkinson's disease, Afib on Eliquis, BETSY, CKD, lives at home with , who presents with SOB and cough.      Pt reports cough and SOB and throat pain and odynophagia and dysphagia coughing with every meal. Her cough is deep and rhonchorous but she is struggling to bring up any secretions.    She is not sure about fever.      She was initially admitted to [facility] with concerns for aspiration PNA and new hypoxia.      This am she is in resp distress, very poor and weak cough, O2 sats 70s despite 10-12l/min O2 - transitioned to NRB mask with sats improved to 92% and then transitioned to vapotherm.      I ordered to transfer to ICU due to rapid worsening of oxygenation and pt struggling managing own secretion with very weak cough. Ordered 3% saline nebs and repeat CXR and Pulm evaluation. Given rapid progression of hypoxia and clinical status I strongly suspect mucus plugging.      She is to stay strict NPO. \"    Additional Reported Symptoms/Complaints: We were initially consulted on 06/17 upon admission as pt was admitted with asipration PNA. Plan was to complete MBS. Pt was noted with increasing O2 demands and was sent to ICU. She was eventually intubated on 06/18 and extubated on 06/27 to BiPAP. Hx of aspiration and dysphagia and seen as OP at [facility]. Pt's  present in room during BSE. Stated that pt has had difficulty with swallowing, including globus sensation and coughing/choking with PO intake. Pt reported reduced appetite since admission. Per spouse takes sinemet 4x/day and does not always correlate dosage to PO intake.        Predisposing dysphagia risk factors: Parkinson's Disease, Age, GERD, Hx of dysphagia and Hx of aspiration  Clinical signs of possible chronic dysphagia: hx of dysphagia and hx of aspiration  Precipitating dysphagia risk factors: recent intubation, sepsis and prolonged intubation      FLEXIBLE ENDOSCOPIC EXAM: Endoscope was passed through the left nare with no complications and no bleeding. TESTING POSITION: Pt seated upright in bed    ORAL MOTOR FUNCTION: WFL    BASE OF TONGUE RANGE OF MOTION: WFL    LATERAL PHARYNGEAL WALL RANGE OF MOTION: adequate pharyngeal constriction noted throughout    VELOPHARYNGEAL FUNCTION: WFL    TRUE VOCAL FOLDS: movement bilaterally with slight glottal gap noted    FALSE VOCAL FOLDS: slight edema on left false vocal fold    ARYTENOID CARTILAGE: upon adduction left arytenoid and right arytenoid do no meet in parallel fashion with left arytenoid bowing downward in comparison.   Mild edema of left arytenoid and is ejected into the larynx or out of the airway  [] 5 Material enters the airway, contacts the vocal folds, and is not ejected into the airway  [] 4 Material enters the airway, contacts the vocal folds, and is ejected from the airway  [] 3 Material enters the airway, remains above the vocal folds, and is not ejected from airway  [] 2 Material enters the airway, remains above the vocal folds, and is ejected from airway  [] 1 Material does not enter the airway      IMPRESSION:  Impaired oropharygneal phases of the swallow characterized by reduced oral bolus control resulting in premature spillage to pharynx with thin tsp, thin cup, nectar tsp, and nectar cup and prolonged oral phase of regular solids. There was deep penetration with thin tsp and aspiration with thin cup. Pt sensate of aspiration and exhibited productive cough, effective in ejecting residue from trachea. She exhibits reduced pharyngeal constriction resulting in residue throughout the pharynx. Suspect posterior commissure hypertrophy and mild edema at interarytenoid space. Upon adduction, left arytenoid and right arytenoid do not meet in parallel fashion with left arytenoid bowing downward in comparison. Left false vocal noted to have slight edema. Vocal folds move bilaterally with slight glottal gap noted upon phonation. I recommend the pt receive oral intake of nectar thick liquids and soft and bite-sized solids with medications crushed in puree as able. Dysphagia Treatment Goals  Continue with goals as outlined in treatment plan. ENDOSCOPE REMOVAL: Endoscope was removed without incident, no adverse reactions. PRE TEST HR: 95  PRE TEST O2: 90  POST TEST HR: 95  POST TEST O2: 90    RECOMMENDATIONS: I recommend the pt receive oral intake of nectar thick liquids and soft and bite-sized solids with medications crushed in purees as able.     RECOMMENDED COMPENSATORY STRATEGIES / POSTURAL CHANGES: Upright posture, small bites and sips, double swallow, slow rate of intake, volitional throat clear post swallows    EDUCATION:  Reviewed results and recommendations of this evaluation, signs, symptoms, and risk of aspiration, as well as diet recommendations and strategies.   Reviewed and discussed goals and plan of care with pt, pt's , RN, and MD.    TREATMENT TIME: 0934-1030, 56 mins, FEES and dyshagia tx    ELECTRONIC SIGNATURE:  [id number]  Clinician

## 2022-06-30 NOTE — PROGRESS NOTES
Occupational Therapy Daily Treatment Note    Unit: PCU  Date:  6/30/2022  Patient Name:    Terry De Jesus  Admitting diagnosis:  Shortness of breath [R06.02]  Septicemia ([facility] [A41.9]  Chronic anemia [D64.9]  Acute respiratory failure with hypoxia ([facility] [J96.01]  Aspiration pneumonia of left lower lobe, unspecified aspiration pneumonia type ([facility] [J69.0]  Chronic kidney disease, unspecified CKD stage [N18.9]  Admit Date:  6/17/2022  Precautions/Restrictions:    Fall risk, Lines -IV, Supplemental O2 (2L), Zimmerman catheter and rectal tube, Telemetry, Continuous pulse oximetry and Isolation Precautions: Contact Blind left eye, Parkinson's on Sinemet.   Pt transferred to ICU on 6-17-22 - Worsening shortness of breath and respiratory distress    PT intubated on 6-18-22 6-27 bipap, 6-28          Discharge Recommendations: SNF  DME needs for discharge: defer to facility       Therapy recommendations for staff:   Assist of 2 for bed mobility     AM-PAC Score: AM-PAC Inpatient Daily Activity Raw Score: 6  Home Health S4 Level: NA       Treatment Time:  2990-3740   Treatment number:  2   Timed code treatment minutes:50 minutes  Total treatment minutes:   50 minutes    History of Present Illness:   per H&P on 6-17-22    81 y.o. female with a past medical history of atrial fibrillation on Eliquis, CKD, Parkinson's disease, diabetes, and sleep apnea who presents to the ED complaining of shortness of breath.  The patient states that she has been short of breath since yesterday.  She describes a cough, states that her throat hurts from coughing.  She denies any sputum production.  She denies fever body aches or chills.  She does state that she has leg swelling however her  states it is chronic in nature and not worse than usual.  She denies chest pain.  She does live at home with her . [facility] states she does have frequent aspiration when eating.     history of a spinal fusion and a carotid endarterectomy   Subjective:  Pt in the bed and willing to work with OT     Pain   No      Bed Mobility:   Supine to Sit:  Max A  and 2 persons  Sit to Supine:  Max A  and 2 persons  Rolling: Max A   Scooting: Max A  and 2 persons    Transfer Training:   Sit to stand:   Not Tested  Stand to sit:  Not Tested  Bed to Chair:  Not Tested  Bed to [facility]:   Not Tested  Standard toilet:   Not Tested    Activity Tolerance   Pt completed therapy session with pain with sitting with rectal tube     ADL Training:   Upper body dressing: Not Tested  Upper body bathing:  SBA to bathe face and max to bathe back   Lower body dressing:  Max A to don socks   Lower body bathing:  Not Tested  Toileting: Total A  Grooming/Hygiene:  Not Tested    Therapeutic Exercise:   Hand flex/ext:  x10  Wrist flex/ext:  x10  Elbow flex/ext  Shoulder flex/ext:  x10  Shoulder shrugs:  x10    Patient Education:   Exercise program     Positioning Needs:   Pt in bed, alarm set, positioned in proper neutral alignment and pressure relief provided. Family Present:  No    Assessment:   The pt was max assist of two for bed mobility. The pt sat edge of the bed for 5 mins with mod assist for sitting balance then requested to lay down due to pain from rectal tube. The pt was put in chair position in the bed and the pt able to tolerate for 13 mins. Pt performed UE exercises with AAROM. Pt able to bathe face when sitting edge of bed with SBA. Pt needs continued OT to improve functional IND. GOALS  1). Bed to toilet/BSC: Mod A  and 2 persons with appropriate AD      To be met in 5 Visits:  1). Supine to/from Sit:             Mod A  and 2 persons  2). Upper Body Bathing:         Min A   3). Lower Body Bathing: Mod A   4). Upper Body Dressing:       Min A   5). Lower Body Dressing:       Max A   6).  Pt to demonstrate UE exs x 15 reps with minimal cues      Plan: cont with EUGENE Diaz OTR/L [id number]      If patient discharges from this facility prior to next visit,

## 2022-06-30 NOTE — PROGRESS NOTES
Pulmonary & Critical Care Medicine ICU Progress Note    CC: shortness of breath     Events of Last 24 hours:   O2 weaned off. Transferred from ICU yesterday    Vascular lines: IV:   6/19/2022 PICC    MV: 6/18/2022 -6/27/22  Vent Mode: AC/VC Resp Rate (Set): 0 bmp/Vt (Set, mL): 0 mL/ /FiO2 : 30 %  No results for input(s): PHART, QIW5ROC, PO2ART in the last 72 hours. IV:   sodium chloride      sodium chloride         Vitals:  Blood pressure (!) 167/64, pulse 94, temperature 98.4 °F (36.9 °C), temperature source Axillary, resp. rate 24, height 5' 6\" (1.676 m), weight 259 lb 7.7 oz (117.7 kg), SpO2 94 %, not currently breastfeeding. On  RA  Constitutional:  In no distress. Eyes: PERRL. Conjunctivae anicteric. ENT: Normal nose. Normal tongue. Neck:  Trachea is midline. No thyroid tenderness. Respiratory: + accessory muscle usage. decreased breath sounds. No wheezes. No rales. No Rhonchi. Cardiovascular: Normal S1S2. No digit clubbing. No digit cyanosis. +  LE edema. Gastrointestinal: No mass palpated. No tenderness palpated. No umbilical hernia. Skin: No rash on the exposed extremities. No Nodules or induration on exposed extremities. Psychiatric: No anxiety or Agitation. Alert and Oriented to person, place and time.       Scheduled Meds:   metoprolol tartrate  25 mg Oral BID    ipratropium-albuterol  1 ampule Inhalation Q4H WA    iron sucrose (VENOFER) iv piggyback 100 mL (Admin over 60 minutes)  100 mg IntraVENous Q24H    pantoprazole  40 mg IntraVENous BID    lidocaine 1 % injection  5 mL IntraDERmal Once    chlorhexidine  15 mL Mouth/Throat BID    sodium chloride flush  10 mL IntraVENous 2 times per day    apixaban  2.5 mg Oral BID    atorvastatin  40 mg Oral Nightly    carbidopa-levodopa  1 tablet Oral 4x Daily    DULoxetine  60 mg Oral Daily    budesonide-formoterol  2 puff Inhalation BID    lamoTRIgine  50 mg Oral BID    montelukast  10 mg Oral Daily    propafenone  150 mg Oral 3 times per day       Data:  CBC:   Recent Labs     06/28/22  0448 06/29/22  0505 06/30/22  1032   WBC 15.9* 15.1* 14.4*   HGB 9.5* 9.9* 10.1*   HCT 30.3* 31.0* 31.2*   MCV 87.0 86.2 85.9    361 379     BMP:   Recent Labs     06/28/22  0448 06/29/22  0505 06/30/22  1032    141 146*   K 3.9 3.7 3.5    106 111*   CO2 17* 19* 22   PHOS 3.8 4.1 3.5   BUN 60* 56* 54*   CREATININE 2.5* 2.8* 2.5*     LIVER PROFILE:   No results for input(s): AST, ALT, LIPASE, BILIDIR, BILITOT, ALKPHOS in the last 72 hours. Invalid input(s): AMYLASE,  ALB    Microbiology:  6/17/2022 SARS-CoV-2 and influenza are negative  6/17/2022 MRSA DNA screen positive  6/17/2022 blood NG  6/17/2022 sputum NRF  6/18/2022 tracheal aspirate NRF  6/19/2022 BAL Candida  6/19/2022 respiratory viral panel negative    Imaging:  CXR 6/27/2022   No significant interval change in bilateral airspace disease and small   effusions.  Unchanged positioning of support devices.        ACT 6/23/22   Small right and very small effusions, diverticulosis, nonspecific rectal wall thickening      ASSESSMENT:  · Acute hypoxemic respiratory failure  · Aspiration pneumonia  · BETSY on BiPAP 22/15 with 1 L bleed in   · Metabolic acidosis likely due to FARHEEN  · Parkinson's disease  · Atrial fibrillation  · Acute on chronic kidney failure, baseline creatinine 1.6  · Acute on chronic anemia, s/p 2 U PRBC 6/23/2022  · H/O esophagitis  · Pleural effusions are small bilaterally    PLAN:  · Home Bipap QHS  · S/p 10days cefepime & vancomycin  · I will sign off

## 2022-06-30 NOTE — FLOWSHEET NOTE
06/30/22 0047   Vital Signs   Temp 98.7 °F (37.1 °C)   Temp Source Axillary   Heart Rate 89   Heart Rate Source Monitor   Resp 20   /72   BP Method Automatic   MAP (Calculated) 94.33   Patient Position Semi fowlers   Level of Consciousness Alert (0)   MEWS Score 1   Oxygen Therapy   SpO2 96 %   O2 Device PAP (positive airway pressure)     Pt awake in bed. VS as shown above. Pt denies any further needs at this time. Call light in reach and bed in lowest position.

## 2022-06-30 NOTE — PROGRESS NOTES
Comprehensive Nutrition Assessment    Type and Reason for Visit:  Reassess    Nutrition Recommendations/Plan:   1. Continue the Pureed Mildy Thick liquids   2. Added Magic Cups BID     Malnutrition Assessment:  Malnutrition Status:  Mild malnutrition (06/24/22 1115)    Context:  Acute Illness     Findings of the 6 clinical characteristics of malnutrition:  Energy Intake:  Mild decrease in energy intake (Comment)  Weight Loss:  Greater than 2% over 1 week (- 7# or 2.3% weight loss since 6/18/22)     Body Fat Loss:  No significant body fat loss     Muscle Mass Loss:  No significant muscle mass loss    Fluid Accumulation:  No significant fluid accumulation     Strength:  Not Performed    Nutrition Assessment:    patient has declined  from a nutritional standpoint AEB she was extubated 6/28 and PO intakes have been < 50%  she remains at risk for further compromise d/t dysphagia , MBS completed today awaiting full report,  altered nutrition-related labs, and anemia; will continue current PO diet and add magic cups     Nutrition Related Findings:    asleep at time of RD visit; pt extubated 6/28; MBS today; intakes minimal; +BM today Wound Type: Stage II,Pressure Injury (stage 2 pressure injury on R buttocks)       Current Nutrition Intake & Therapies:    Average Meal Intake: NPO  Average Supplements Intake: 26-50%,1-25%  ADULT DIET; Dysphagia - Pureed; Mildly Thick (Nectar)    Anthropometric Measures:  Height: 5' 6\" (167.6 cm)  Ideal Body Weight (IBW): 130 lbs (59 kg)    Admission Body Weight: 270 lb (122.5 kg) (obtained on 6/18/22; actual weight)  Current Body Weight: 259 lb (117.5 kg), 202.8 % IBW.  Weight Source: Bed Scale  Current BMI (kg/m2): 41.8  Usual Body Weight: 270 lb (122.5 kg) (obtained on 6/18/22; actual weight)  % Weight Change (Calculated): -2.3  Weight Adjustment For: No Adjustment                 BMI Categories: Obese Class 3 (BMI 40.0 or greater) (42.56)    Estimated Daily Nutrient Needs:  Energy Requirements Based On: Kcal/kg  Weight Used for Energy Requirements: Current  Energy (kcal/day): 960 - 1320 kcals based on 8-11 kcals/kg/CBW  Weight Used for Protein Requirements: Ideal  Protein (g/day): 148 - 159 g protein based on 2.5-2.7 g/kg/IBW  Method Used for Fluid Requirements: 1 ml/kcal  Fluid (ml/day): 960 - 1320 ml    Nutrition Diagnosis:   · Inadequate oral intake related to inadequate protein-energy intake,impaired respiratory function as evidenced by NPO or clear liquid status due to medical condition,intubation,nutrition support - enteral nutrition,lab values,swallow study results      Nutrition Interventions:   Food and/or Nutrient Delivery: Continue Current Diet,Start Oral Nutrition Supplement  Nutrition Education/Counseling: No recommendation at this time  Coordination of Nutrition Care: Continue to monitor while inpatient  Plan of Care discussed with: ICU Team    Goals:  Previous Goal Met: Goal(s) Achieved  Goals: PO intake 50% or greater,by next RD assessment       Nutrition Monitoring and Evaluation:   Behavioral-Environmental Outcomes: None Identified  Food/Nutrient Intake Outcomes: Diet Advancement/Tolerance,Food and Nutrient Intake,Supplement Intake  Physical Signs/Symptoms Outcomes: Biochemical Data,Nutrition Focused Physical Findings,Weight,Chewing or Swallowing    Discharge Planning:     Too soon to determine     Ron Antohny, [address], [place]  Contact: [phone]

## 2022-06-30 NOTE — PROGRESS NOTES
Inpatient Physical Therapy Evaluation and Treatment    Unit: ICU  Date:  6/30/2022  Patient Name:    Jeramy Cosby  Admitting diagnosis:  Shortness of breath [R06.02]  Septicemia ([facility] [A41.9]  Chronic anemia [D64.9]  Acute respiratory failure with hypoxia ([facility] [J96.01]  Aspiration pneumonia of left lower lobe, unspecified aspiration pneumonia type ([facility] [J69.0]  Chronic kidney disease, unspecified CKD stage [N18.9]  Admit Date:  6/17/2022  Precautions/Restrictions/WB Status/ Lines/ Wounds/ Oxygen: Fall risk, Lines -IV, Supplemental O2 (2L), Purewick catheter and rectal tube, Telemetry, Continuous pulse oximetry and Isolation Precautions: Contact (MRSA),     Treatment Time:  13:35-14:15  Treatment Number:  2   Timed Code Treatment Minutes: 40 minutes  Total Treatment Minutes:  40  minutes    Patient Goals for Therapy: \"exercise\"      Discharge Recommendations: SNF  DME needs for discharge: defer to facility       Therapy recommendation for EMS Transport: requires transport by cot due to need for lift equip to transfer. Therapy recommendations for staff:   Assist of 2 for rolling. History of Present Illness: Admitted 6/17 for resp failure from CHF, PNA. Intubated 6/18, extubated to BiPap 2/71, with metabolic acidosis. PMHx including not limited to: Parkinson's Disease, CKD IIIB, paroxysmal a-fib, HTN    Home Health S4 Level Recommendation:  NA  AM-PAC Mobility Score       AM-PAC Inpatient Mobility without Stair Climbing Raw Score : 7    Pain   No    Cognition    A&O Person and Place    Able to follow 1 step commands    Subjective  Patient lying supine in bed with no family present. Pt agreeable to this PT/OT cotx. Balance  Sitting:  Fair ; Min A   Comments: Tolerated ~5 min at EOB before becoming tearful, saying she had pain at rectal tube site. Completed weight shifts, UE reach activities (limited ROM) and UE/LE therex with freq rest breaks during this time.     Standing: Not tested; N/A  Comments: Unsafe to attempt. Bed Mobility   Supine to Sit:    Max A  and 2 persons  Sit to Supine:   Max A  and 2 persons  Rolling: Max A  and 2 persons to right. Scooting in sitting: Max A for small scoots to reposition while sitting at EOB. Scooting in supine:  Not Tested    Transfer Training  Unsafe to attempt   Sit to stand:   Not Tested  Stand to sit:   Not Tested  Bed to Chair:   Not Tested with use of N/A    Gait gait deferred due to medical status; pt ambulated 0 ft. Stair Training deferred, pt unsafe/ not appropriate to complete stairs at this time    Activity Tolerance   Pt completed therapy session with No adverse symptoms noted w/activity. VSS. Positioning Needs   Pt in bed, alarm set, positioned in proper neutral alignment and pressure relief provided. Call light provided and all needs within reach    Exercises Initiated  all completed bilaterally unless indicated   Sidelying knee flex/ext AROM x 5 reps LLE  Ankle pumps x 10 reps at EOB, x 10 reps in chair position  LAQ x 10 reps at EOB, x 10 reps in chair position    Other  None. Patient/Family Education   Pt educated on role of inpatient PT, POC, importance of continued activity, HEP and calling for assist with mobility. Assessment  Pt able to sit up to EOB 5 minutes today, tolerance limited by discomfort of rectal tube. Bed mobility requires Max A x 2 people. She is quite weak in UE/LE/trunk. Pt participates well and is motivated. Recommending SNF upon discharge as patient functioning well below baseline, demonstrates good rehab potential and unable to return home due to burden of care beyond caregiver ability and home environment not conducive to patient recovery. Goals : To be met in 3 visits:  1). Independent with LE Ex x 10 reps   2). Supine to/from Sit: Max A  3). Tolerate EOB sitting x 10 minutes with Min A    To be met in 6 visits:  1). Supine to/from sit: SBA  2). Sit to/from stand: Min A   3).   Bed to chair: Mod A 4).  Gait: Ambulate 10 ft.   with  Min A  and use of rolling walker (RW)  5). Tolerate B LE exercises 3 sets of 10-15 reps    Rehabilitation Potential: Good  Strengths for achieving goals include:   Pt motivated, PLOF, Family Support and Pt cooperative   Barriers to achieving goals include:    No Barriers    Plan    To be seen 3-5 x / week  while in acute care setting for therapeutic exercises, bed mobility, transfers, progressive gait training, balance training, and family/patient education. Signature: Christine Rodriguez, PT, DPT    If patient discharges from this facility prior to next visit, this note will serve as the Discharge Summary.

## 2022-06-30 NOTE — PROGRESS NOTES
Pt awake in bed. Assessment completed and medications given. VS as charted. A&Ox4 Pt denies any further needs at this time. Call light in reach and bed in lowest position.

## 2022-07-01 ENCOUNTER — APPOINTMENT (OUTPATIENT)
Dept: GENERAL RADIOLOGY | Age: 82
DRG: 004 | End: 2022-07-01
Payer: MEDICARE

## 2022-07-01 LAB
ALBUMIN SERPL-MCNC: 3.4 G/DL (ref 3.4–5)
AMORPHOUS: NORMAL /HPF
ANION GAP SERPL CALCULATED.3IONS-SCNC: 13 MMOL/L (ref 3–16)
ANION GAP SERPL CALCULATED.3IONS-SCNC: 13 MMOL/L (ref 3–16)
BASE EXCESS ARTERIAL: -1.9 MMOL/L (ref -3–3)
BASOPHILS ABSOLUTE: 0.1 K/UL (ref 0–0.2)
BASOPHILS ABSOLUTE: 0.1 K/UL (ref 0–0.2)
BASOPHILS RELATIVE PERCENT: 0.4 %
BASOPHILS RELATIVE PERCENT: 0.4 %
BILIRUBIN URINE: NEGATIVE
BLOOD, URINE: ABNORMAL
BUN BLDV-MCNC: 45 MG/DL (ref 7–20)
BUN BLDV-MCNC: 47 MG/DL (ref 7–20)
CALCIUM SERPL-MCNC: 9.3 MG/DL (ref 8.3–10.6)
CALCIUM SERPL-MCNC: 9.4 MG/DL (ref 8.3–10.6)
CARBOXYHEMOGLOBIN ARTERIAL: 0.3 % (ref 0–1.5)
CHLORIDE BLD-SCNC: 109 MMOL/L (ref 99–110)
CHLORIDE BLD-SCNC: 111 MMOL/L (ref 99–110)
CLARITY: CLEAR
CO2: 22 MMOL/L (ref 21–32)
CO2: 23 MMOL/L (ref 21–32)
COLOR: YELLOW
CREAT SERPL-MCNC: 2.3 MG/DL (ref 0.6–1.2)
CREAT SERPL-MCNC: 2.3 MG/DL (ref 0.6–1.2)
EOSINOPHILS ABSOLUTE: 0 K/UL (ref 0–0.6)
EOSINOPHILS ABSOLUTE: 0.1 K/UL (ref 0–0.6)
EOSINOPHILS RELATIVE PERCENT: 0.1 %
EOSINOPHILS RELATIVE PERCENT: 1.1 %
EPITHELIAL CELLS, UA: NORMAL /HPF (ref 0–5)
GFR AFRICAN AMERICAN: 25
GFR AFRICAN AMERICAN: 25
GFR NON-AFRICAN AMERICAN: 20
GFR NON-AFRICAN AMERICAN: 20
GLUCOSE BLD-MCNC: 157 MG/DL (ref 70–99)
GLUCOSE BLD-MCNC: 177 MG/DL (ref 70–99)
GLUCOSE BLD-MCNC: 197 MG/DL (ref 70–99)
GLUCOSE URINE: NEGATIVE MG/DL
HCO3 ARTERIAL: 22.8 MMOL/L (ref 21–29)
HCT VFR BLD CALC: 31.5 % (ref 36–48)
HCT VFR BLD CALC: 33 % (ref 36–48)
HEMOGLOBIN, ART, EXTENDED: 11.2 G/DL (ref 12–16)
HEMOGLOBIN: 10.2 G/DL (ref 12–16)
HEMOGLOBIN: 10.7 G/DL (ref 12–16)
KETONES, URINE: NEGATIVE MG/DL
LACTIC ACID: 1.1 MMOL/L (ref 0.4–2)
LEUKOCYTE ESTERASE, URINE: NEGATIVE
LYMPHOCYTES ABSOLUTE: 0.8 K/UL (ref 1–5.1)
LYMPHOCYTES ABSOLUTE: 0.9 K/UL (ref 1–5.1)
LYMPHOCYTES RELATIVE PERCENT: 3 %
LYMPHOCYTES RELATIVE PERCENT: 5.4 %
MAGNESIUM: 1.8 MG/DL (ref 1.8–2.4)
MCH RBC QN AUTO: 27.7 PG (ref 26–34)
MCH RBC QN AUTO: 27.9 PG (ref 26–34)
MCHC RBC AUTO-ENTMCNC: 32.4 G/DL (ref 31–36)
MCHC RBC AUTO-ENTMCNC: 32.5 G/DL (ref 31–36)
MCV RBC AUTO: 85.2 FL (ref 80–100)
MCV RBC AUTO: 86.2 FL (ref 80–100)
METHEMOGLOBIN ARTERIAL: 0.3 %
MICROSCOPIC EXAMINATION: YES
MONOCYTES ABSOLUTE: 0.7 K/UL (ref 0–1.3)
MONOCYTES ABSOLUTE: 1.1 K/UL (ref 0–1.3)
MONOCYTES RELATIVE PERCENT: 3.9 %
MONOCYTES RELATIVE PERCENT: 5.4 %
NEUTROPHILS ABSOLUTE: 12.2 K/UL (ref 1.7–7.7)
NEUTROPHILS ABSOLUTE: 27.1 K/UL (ref 1.7–7.7)
NEUTROPHILS RELATIVE PERCENT: 87.7 %
NEUTROPHILS RELATIVE PERCENT: 92.6 %
NITRITE, URINE: NEGATIVE
O2 SAT, ARTERIAL: 86.5 %
O2 THERAPY: ABNORMAL
PCO2 ARTERIAL: 38.3 MMHG (ref 35–45)
PDW BLD-RTO: 14.3 % (ref 12.4–15.4)
PDW BLD-RTO: 14.7 % (ref 12.4–15.4)
PERFORMED ON: ABNORMAL
PH ARTERIAL: 7.39 (ref 7.35–7.45)
PH UA: 5.5 (ref 5–8)
PHOSPHORUS: 2.5 MG/DL (ref 2.5–4.9)
PHOSPHORUS: 3 MG/DL (ref 2.5–4.9)
PLATELET # BLD: 342 K/UL (ref 135–450)
PLATELET # BLD: 355 K/UL (ref 135–450)
PMV BLD AUTO: 7.5 FL (ref 5–10.5)
PMV BLD AUTO: 7.6 FL (ref 5–10.5)
PO2 ARTERIAL: 51.7 MMHG (ref 75–108)
POTASSIUM SERPL-SCNC: 3.4 MMOL/L (ref 3.5–5.1)
POTASSIUM SERPL-SCNC: 3.5 MMOL/L (ref 3.5–5.1)
PRO-BNP: ABNORMAL PG/ML (ref 0–449)
PROCALCITONIN: 0.35 NG/ML (ref 0–0.15)
PROTEIN UA: ABNORMAL MG/DL
RBC # BLD: 3.69 M/UL (ref 4–5.2)
RBC # BLD: 3.83 M/UL (ref 4–5.2)
RBC UA: NORMAL /HPF (ref 0–4)
SODIUM BLD-SCNC: 144 MMOL/L (ref 136–145)
SODIUM BLD-SCNC: 147 MMOL/L (ref 136–145)
SPECIFIC GRAVITY UA: 1.01 (ref 1–1.03)
TCO2 ARTERIAL: 23.9 MMOL/L
URINE REFLEX TO CULTURE: ABNORMAL
URINE TYPE: ABNORMAL
UROBILINOGEN, URINE: 0.2 E.U./DL
WBC # BLD: 13.9 K/UL (ref 4–11)
WBC # BLD: 29.2 K/UL (ref 4–11)
WBC UA: NORMAL /HPF (ref 0–5)

## 2022-07-01 PROCEDURE — 6370000000 HC RX 637 (ALT 250 FOR IP): Performed by: INTERNAL MEDICINE

## 2022-07-01 PROCEDURE — 99291 CRITICAL CARE FIRST HOUR: CPT | Performed by: INTERNAL MEDICINE

## 2022-07-01 PROCEDURE — 6360000002 HC RX W HCPCS: Performed by: INTERNAL MEDICINE

## 2022-07-01 PROCEDURE — 2580000003 HC RX 258: Performed by: INTERNAL MEDICINE

## 2022-07-01 PROCEDURE — 85025 COMPLETE CBC W/AUTO DIFF WBC: CPT

## 2022-07-01 PROCEDURE — 2000000000 HC ICU R&B

## 2022-07-01 PROCEDURE — 94761 N-INVAS EAR/PLS OXIMETRY MLT: CPT

## 2022-07-01 PROCEDURE — 94640 AIRWAY INHALATION TREATMENT: CPT

## 2022-07-01 PROCEDURE — 84145 PROCALCITONIN (PCT): CPT

## 2022-07-01 PROCEDURE — 36415 COLL VENOUS BLD VENIPUNCTURE: CPT

## 2022-07-01 PROCEDURE — C9113 INJ PANTOPRAZOLE SODIUM, VIA: HCPCS | Performed by: INTERNAL MEDICINE

## 2022-07-01 PROCEDURE — 82803 BLOOD GASES ANY COMBINATION: CPT

## 2022-07-01 PROCEDURE — 84100 ASSAY OF PHOSPHORUS: CPT

## 2022-07-01 PROCEDURE — 83605 ASSAY OF LACTIC ACID: CPT

## 2022-07-01 PROCEDURE — 87040 BLOOD CULTURE FOR BACTERIA: CPT

## 2022-07-01 PROCEDURE — 81001 URINALYSIS AUTO W/SCOPE: CPT

## 2022-07-01 PROCEDURE — 83880 ASSAY OF NATRIURETIC PEPTIDE: CPT

## 2022-07-01 PROCEDURE — 2700000000 HC OXYGEN THERAPY PER DAY

## 2022-07-01 PROCEDURE — 80069 RENAL FUNCTION PANEL: CPT

## 2022-07-01 PROCEDURE — 71045 X-RAY EXAM CHEST 1 VIEW: CPT

## 2022-07-01 PROCEDURE — 94660 CPAP INITIATION&MGMT: CPT

## 2022-07-01 PROCEDURE — 51702 INSERT TEMP BLADDER CATH: CPT

## 2022-07-01 PROCEDURE — 83735 ASSAY OF MAGNESIUM: CPT

## 2022-07-01 PROCEDURE — 99233 SBSQ HOSP IP/OBS HIGH 50: CPT | Performed by: INTERNAL MEDICINE

## 2022-07-01 PROCEDURE — 2500000003 HC RX 250 WO HCPCS: Performed by: INTERNAL MEDICINE

## 2022-07-01 RX ORDER — SODIUM CHLORIDE FOR INHALATION 0.9 %
3 VIAL, NEBULIZER (ML) INHALATION EVERY 8 HOURS
Status: DISCONTINUED | OUTPATIENT
Start: 2022-07-01 | End: 2022-07-03

## 2022-07-01 RX ORDER — FUROSEMIDE 10 MG/ML
60 INJECTION INTRAMUSCULAR; INTRAVENOUS ONCE
Status: COMPLETED | OUTPATIENT
Start: 2022-07-01 | End: 2022-07-01

## 2022-07-01 RX ORDER — METOPROLOL TARTRATE 5 MG/5ML
5 INJECTION INTRAVENOUS EVERY 6 HOURS
Status: DISCONTINUED | OUTPATIENT
Start: 2022-07-01 | End: 2022-07-05

## 2022-07-01 RX ORDER — FLUCONAZOLE 2 MG/ML
200 INJECTION, SOLUTION INTRAVENOUS EVERY 24 HOURS
Status: DISCONTINUED | OUTPATIENT
Start: 2022-07-01 | End: 2022-07-06

## 2022-07-01 RX ORDER — POTASSIUM CHLORIDE 7.45 MG/ML
10 INJECTION INTRAVENOUS
Status: COMPLETED | OUTPATIENT
Start: 2022-07-01 | End: 2022-07-02

## 2022-07-01 RX ADMIN — MEROPENEM 1000 MG: 1 INJECTION, POWDER, FOR SOLUTION INTRAVENOUS at 18:48

## 2022-07-01 RX ADMIN — SODIUM CHLORIDE, PRESERVATIVE FREE 10 ML: 5 INJECTION INTRAVENOUS at 20:25

## 2022-07-01 RX ADMIN — Medication 1500 MG: at 16:36

## 2022-07-01 RX ADMIN — IPRATROPIUM BROMIDE AND ALBUTEROL SULFATE 1 AMPULE: 2.5; .5 SOLUTION RESPIRATORY (INHALATION) at 07:42

## 2022-07-01 RX ADMIN — Medication 2 PUFF: at 07:53

## 2022-07-01 RX ADMIN — FUROSEMIDE 60 MG: 10 INJECTION, SOLUTION INTRAMUSCULAR; INTRAVENOUS at 20:42

## 2022-07-01 RX ADMIN — PANTOPRAZOLE SODIUM 40 MG: 40 INJECTION, POWDER, LYOPHILIZED, FOR SOLUTION INTRAVENOUS at 10:55

## 2022-07-01 RX ADMIN — POTASSIUM CHLORIDE 10 MEQ: 7.45 INJECTION INTRAVENOUS at 20:45

## 2022-07-01 RX ADMIN — IPRATROPIUM BROMIDE AND ALBUTEROL SULFATE 1 AMPULE: 2.5; .5 SOLUTION RESPIRATORY (INHALATION) at 11:18

## 2022-07-01 RX ADMIN — PANTOPRAZOLE SODIUM 40 MG: 40 INJECTION, POWDER, LYOPHILIZED, FOR SOLUTION INTRAVENOUS at 20:25

## 2022-07-01 RX ADMIN — POTASSIUM CHLORIDE 10 MEQ: 7.45 INJECTION INTRAVENOUS at 22:26

## 2022-07-01 RX ADMIN — IPRATROPIUM BROMIDE AND ALBUTEROL SULFATE 1 AMPULE: 2.5; .5 SOLUTION RESPIRATORY (INHALATION) at 19:54

## 2022-07-01 RX ADMIN — ACETAMINOPHEN 650 MG: 650 SUPPOSITORY RECTAL at 17:21

## 2022-07-01 RX ADMIN — IRON SUCROSE 100 MG: 20 INJECTION, SOLUTION INTRAVENOUS at 12:55

## 2022-07-01 RX ADMIN — ISODIUM CHLORIDE 3 ML: 0.03 SOLUTION RESPIRATORY (INHALATION) at 15:28

## 2022-07-01 RX ADMIN — IPRATROPIUM BROMIDE AND ALBUTEROL SULFATE 1 AMPULE: 2.5; .5 SOLUTION RESPIRATORY (INHALATION) at 15:27

## 2022-07-01 RX ADMIN — POTASSIUM CHLORIDE 10 MEQ: 7.45 INJECTION INTRAVENOUS at 23:29

## 2022-07-01 RX ADMIN — METOPROLOL TARTRATE 5 MG: 1 INJECTION, SOLUTION INTRAVENOUS at 21:22

## 2022-07-01 RX ADMIN — ONDANSETRON HYDROCHLORIDE 4 MG: 2 INJECTION, SOLUTION INTRAMUSCULAR; INTRAVENOUS at 06:57

## 2022-07-01 RX ADMIN — IPRATROPIUM BROMIDE AND ALBUTEROL SULFATE 1 AMPULE: 2.5; .5 SOLUTION RESPIRATORY (INHALATION) at 23:07

## 2022-07-01 RX ADMIN — FLUCONAZOLE 200 MG: 2 INJECTION, SOLUTION INTRAVENOUS at 10:49

## 2022-07-01 ASSESSMENT — PAIN SCALES - GENERAL
PAINLEVEL_OUTOF10: 0
PAINLEVEL_OUTOF10: 0

## 2022-07-01 NOTE — PROGRESS NOTES
Hospitalist PCU Progress Note      PCP: Mercy Bashir MD    Date of Admission: 6/17/2022    Subjective: admitted for resp failure from CHF , pna, eventually intubated    6/28-patient was extubated at 1:57 PM on 6/27/2022. She was then placed on BiPAP. She is getting periodic breaks from BiPAP. This morning she is on BiPAP and able to follow commands. Speech therapy is going to see the patient. O2 daytime and BiPAP HS. Transferred out of ICU 6/29    Prolonged intubated - there were discussions about trach and PEG support, though able to extubate eventually. Secretions remain a problem. Resp culture (BAL) growing candida    Emesis this am. Back on 3l/min.              Medications:  Reviewed    Infusion Medications    dextrose 70 mL/hr at 07/01/22 1039    sodium chloride      sodium chloride       Scheduled Medications    fluconazole  200 mg IntraVENous Q24H    metoprolol tartrate  25 mg Oral BID    ipratropium-albuterol  1 ampule Inhalation Q4H WA    iron sucrose (VENOFER) iv piggyback 100 mL (Admin over 60 minutes)  100 mg IntraVENous Q24H    pantoprazole  40 mg IntraVENous BID    lidocaine 1 % injection  5 mL IntraDERmal Once    chlorhexidine  15 mL Mouth/Throat BID    sodium chloride flush  10 mL IntraVENous 2 times per day    apixaban  2.5 mg Oral BID    atorvastatin  40 mg Oral Nightly    carbidopa-levodopa  1 tablet Oral 4x Daily    DULoxetine  60 mg Oral Daily    budesonide-formoterol  2 puff Inhalation BID    lamoTRIgine  50 mg Oral BID    montelukast  10 mg Oral Daily    propafenone  150 mg Oral 3 times per day     PRN Meds: carboxymethylcellulose PF, sodium chloride (Inhalant), sodium chloride, sodium chloride flush, sodium chloride, promethazine **OR** ondansetron, acetaminophen **OR** acetaminophen, albuterol      Intake/Output Summary (Last 24 hours) at 7/1/2022 1248  Last data filed at 7/1/2022 1228  Gross per 24 hour   Intake 90 ml   Output 1200 ml   Net -1110 ml       Physical Exam Performed:    BP (!) 146/87   Pulse (!) 121   Temp 98.7 °F (37.1 °C) (Oral)   Resp 23   Ht 5' 6\" (1.676 m)   Wt 259 lb 7.7 oz (117.7 kg)   SpO2 91%   BMI 41.88 kg/m²        General Appearance: Elderly white female conversant, weak, not in acute distress. Mucous Membranes:  Pink , anicteric  Neck: No JVD, no carotid bruit, no thyromegaly  Chest: no accessory muscle use. Coarse upper airwai sounds with rhonchi. Cardiovascular:  RRR S1S2 heard, no murmurs or gallops  Abdomen:  Soft, obese,  undistended, non tender, no organomegaly, BS present  Extremities: diffuse wesly 2 + edema to both LE now essentially resolved. 2+ pitting edema RUE persisting. Distal pulses well felt  Neurological : Awake and alert. Moves all 4 extremities. Labs:   Recent Labs     06/29/22  0505 06/30/22  1032 07/01/22  0709   WBC 15.1* 14.4* 13.9*   HGB 9.9* 10.1* 10.7*   HCT 31.0* 31.2* 33.0*    379 355     Recent Labs     06/29/22  0505 06/30/22  1032 07/01/22  0709    146* 147*   K 3.7 3.5 3.5    111* 111*   CO2 19* 22 23   BUN 56* 54* 47*   CREATININE 2.8* 2.5* 2.3*   CALCIUM 9.3 9.7 9.4   PHOS 4.1 3.5 3.0     No results for input(s): AST, ALT, BILIDIR, BILITOT, ALKPHOS in the last 72 hours. No results for input(s): INR in the last 72 hours. No results for input(s): Kaleen Hope in the last 72 hours. Urinalysis:      Lab Results   Component Value Date/Time    NITRU Negative 06/22/2022 04:10 PM    WBCUA 10-20 06/22/2022 04:10 PM    BACTERIA 2+ 06/22/2022 04:10 PM    RBCUA 3-4 06/22/2022 04:10 PM    BLOODU Negative 06/22/2022 04:10 PM    SPECGRAV 1.025 06/22/2022 04:10 PM    GLUCOSEU Negative 06/22/2022 04:10 PM       Radiology:  XR CHEST PORTABLE   Final Result   No significant interval change in bilateral airspace disease and small   effusions. Unchanged positioning of support devices.          XR CHEST PORTABLE   Final Result   Increasing basilar opacities with probable small effusions. Unchanged positioning of support devices. XR CHEST PORTABLE   Final Result   1. No significant change. XR CHEST PORTABLE   Final Result   No significant interval change in basilar predominant airspace disease and   small left greater than right pleural effusions. Pulmonary vascular   congestion. CT ABDOMEN PELVIS WO CONTRAST Additional Contrast? None   Final Result   Colonic diverticulosis without acute diverticulitis. Nonspecific rectal wall   thickening. Partially visualized pleural effusions with adjacent compressive   atelectasis. XR CHEST PORTABLE   Final Result   Again identified is bibasilar airspace disease with small pleural effusions   and mild increased right perihilar infiltrates. This may be related to   atelectasis or pneumonia. Cardiomegaly. XR CHEST PORTABLE   Final Result   Stable examination with bilateral small effusions and bibasilar atelectasis. Stable ET tube and enteric tube. XR CHEST PORTABLE   Final Result   No acute process. Bibasilar hypoaeration      Stable cardiomegaly         IR PICC WO SQ PORT/PUMP > 5 YEARS   Final Result      XR CHEST PORTABLE   Final Result   Supportive tubing projects in stable position. Partial regression of atelectasis in the right lung base. Persisting   consolidation or or atelectasis in the left lung base. XR CHEST PORTABLE   Final Result   1. Endotracheal tube terminates in appropriate position above the nirali. 2.  Enteric tube terminates in the body of the stomach. 3.  Basilar opacities again demonstrated, right greater than left. Interval   improved aeration of the left lung base. XR CHEST PORTABLE   Final Result   Increasing opacity in the right base, lobar atelectasis versus accumulating   right pleural effusion. Combination of both entities or underlying pneumonia   also possible.       Persisting left basilar airspace disease and small pleural effusion. XR CHEST PORTABLE   Final Result   Hazy bilateral lobe airspace opacities and small left pleural effusion. This   could represent atelectasis or pneumonia in the appropriate clinical setting. CT CHEST WO CONTRAST   Final Result   1. Respiratory motion in the lower lungs. The right base has linear and   dependent atelectasis. The left does have atelectatic changes but could also   have some patchy opacities from pneumonia or aspiration at the left base. 2.  There is no pleural effusion or pneumothorax. 3.  Narrowing of the AP diameter of the trachea with bulging of the   membranous portion due to the gas distended but nondilated esophagus. XR CHEST PORTABLE   Final Result   1. Bibasilar heterogeneous opacities may represent subsegmental atelectasis   and/or mild pulmonary edema. Underlying infection is difficult to exclude. Short-term follow-up PA and lateral chest radiographs may be helpful for   further evaluation. 2.  Cardiomegaly. XR CHEST PORTABLE    (Results Pending)           Assessment/Plan:    Principal Problem:    Acute respiratory failure with hypoxia (HCC)  Active Problems:    Shortness of breath    Aspiration pneumonia of left lower lobe (HCC)    Mild protein-calorie malnutrition (HCC)    Chronic anemia    Parkinson disease (HCC)    Septicemia (HCC)    Chronic kidney disease  Resolved Problems:    * No resolved hospital problems. *           Acute Hypox Resp Failure resolving   Aspiration PNA ongoing. Oxygen requirements increased rapidly from 2l/min to NRB to vapotherm  Continue IV Zosyn and vancomycin. Change Zosyn to cefepime. DC vancomycin 6/22. Finished 10 days of Vanco and cefepime  Intubated 6/18. Extubated on 6/27/2022.  -On BiPAP. Alternate BiPAP with nasal cannula oxygen.    - SLP involved. - resp culture from BAL is growing candida - started diflucan on 7/1/2022       Sepsis POA - due to above. resolved        Dysphagia. Abnormal CT  Cough with every meal intake per family report. Underlying Parkinson's. I ordered for MBS and esophagram, though these will unlikely be done till she is better clinically. SLP at bedside today. - on modified diet. - continues to struggle with secretions. Melena  GI bleed   drop in H/H   GI consulted   H&H held stable. Has since resumed eliquis  IV PPI BID        FARHEEN on CKD IIIB  Cr slightly  better then baseline actually on admission  Lasix 40 mg IV x2 6/21-with poor response-->lasix gtt  Worsening creatinine. Consulted nephrology. Dc lasix gtt.        Parkinson's disease  on Sinemet.         Afib Paroxysmal.   Rythmol   Resume Eliquis. This was held after she had bloody secretions during bronch, but has since been resumed.         HTN - BP on the low side. Nifedipine stopped due to this and also leg edema. Consider alternative when restarting.          DVT Prophylaxis: Eliquis  eliquis held for bloody secretions seen during bronch and melena  Diet: Diet NPO  Code Status: Full Code    Cc. Prognosis guarded. Still struggling with secretions.        Kay Mays MD 7/1/2022 12:48 PM

## 2022-07-01 NOTE — PROGRESS NOTES
[facility] Progress Note           Subjective/   80y.o. year old female who we are seeing in consultation for FARHEEN/CKD.      HPI:  Patient is back on oxygen  Patient extubated on 6/27  Received lasix on 6/25 and 6/28  Zimmerman catheter has been removed on 6/27  Creatinine 2.5-->2.7-->2.5-->2.8-->2.5--> 2.3  Swelling present but improved    ROS:  +weak ; nausea and vomiting this morning    SH:  visitor present    Objective/   GEN:  Chronically ill, BP (!) 146/87   Pulse (!) 121   Temp 98.1 °F (36.7 °C) (Oral)   Resp 23   Ht 5' 6\" (1.676 m)   Wt 259 lb 7.7 oz (117.7 kg)   SpO2 91%   BMI 41.88 kg/m²   HEENT: non-icteric, no JVD  CV: S1, S2 without m/r/g; ++ LE edema  RESP: Decreased breath sounds  ABD: +bs, soft, nt, no hsm  SKIN: warm, no rashes    Data/  Recent Labs     06/29/22  0505 06/30/22  1032 07/01/22  0709   WBC 15.1* 14.4* 13.9*   HGB 9.9* 10.1* 10.7*   HCT 31.0* 31.2* 33.0*   MCV 86.2 85.9 86.2    379 355     Recent Labs     06/29/22  0505 06/30/22  1032 07/01/22  0709    146* 147*   K 3.7 3.5 3.5    111* 111*   CO2 19* 22 23   GLUCOSE 106* 146* 157*   PHOS 4.1 3.5 3.0   MG 2.10 2.00 1.80   BUN 56* 54* 47*   CREATININE 2.8* 2.5* 2.3*   LABGLOM 16* 19* 20*   GFRAA 20* 22* 25*     Component      Latest Ref Rng & Units 6/22/2022 6/22/2022           4:10 PM  4:10 PM   Color, UA      Straw/Yellow Yellow    Clarity, UA      Clear Clear    Glucose, UA      Negative mg/dL Negative    Bilirubin, Urine      Negative Negative    Ketones, Urine      Negative mg/dL TRACE (A)    Specific Gravity, UA      1.005 - 1.030 1.025    Blood, Urine      Negative Negative    pH, UA      5.0 - 8.0 5.5    Protein, UA      Negative mg/dL TRACE (A)    Urobilinogen, Urine      <2.0 E.U./dL 0.2    Nitrite, Urine      Negative Negative    Leukocyte Esterase, Urine      Negative Negative    Microscopic Examination       YES    Urine Type       NotGiven    Mucus, UA      None Seen /LPF Rare

## 2022-07-01 NOTE — CONSULTS
Routine Consult from Dr. Urszula Sutherland for Infection.  Perfect serve message sent to Dr. Zoila Esparza covering for Yuli Myron @ [id number] 7/1/22

## 2022-07-01 NOTE — PROGRESS NOTES
Pt vomiting in BiPAP. BiPAP removed pt mouth oral suctioned. Pt continued to vomit PRN Zofran given. Pt states she did not think to call out. Pt cleaned up gown and linens changed. Pt educated to call out if this happens again. Call light placed in pt hand. O2 demand increased off BiPAP from RA 90% to 3L 92%.

## 2022-07-01 NOTE — PROGRESS NOTES
RT Inhaler-Nebulizer Bronchodilator Protocol Note    There is a bronchodilator order in the chart from a provider indicating to follow the RT Bronchodilator Protocol and there is an Initiate RT Inhaler-Nebulizer Bronchodilator Protocol order as well (see protocol at bottom of note). CXR Findings:  No results found. The findings from the last RT Protocol Assessment were as follows:   History Pulmonary Disease: (P) Chronic pulmonary disease  Respiratory Pattern: (P) Dyspnea on exertion or RR 21-25 bpm  Breath Sounds: (P) Inspiratory and expiratory or bilateral wheezing and/or rhonchi  Cough: (P) Strong, productive  Indication for Bronchodilator Therapy: (P) On home bronchodilators  Bronchodilator Assessment Score: (P) 11    Aerosolized bronchodilator medication orders have been revised according to the RT Inhaler-Nebulizer Bronchodilator Protocol below. Respiratory Therapist to perform RT Therapy Protocol Assessment initially then follow the protocol. Repeat RT Therapy Protocol Assessment PRN for score 0-3 or on second treatment, BID, and PRN for scores above 3. No Indications - adjust the frequency to every 6 hours PRN wheezing or bronchospasm, if no treatments needed after 48 hours then discontinue using Per Protocol order mode. If indication present, adjust the RT bronchodilator orders based on the Bronchodilator Assessment Score as indicated below. Use Inhaler orders unless patient has one or more of the following: on home nebulizer, not able to hold breath for 10 seconds, is not alert and oriented, cannot activate and use MDI correctly, or respiratory rate 25 breaths per minute or more, then use the equivalent nebulizer order(s) with same Frequency and PRN reasons based on the score. If a patient is on this medication at home then do not decrease Frequency below that used at home.     0-3 - enter or revise RT bronchodilator order(s) to equivalent RT Bronchodilator order with Frequency of every 4 hours PRN for wheezing or increased work of breathing using Per Protocol order mode. 4-6 - enter or revise RT Bronchodilator order(s) to two equivalent RT bronchodilator orders with one order with BID Frequency and one order with Frequency of every 4 hours PRN wheezing or increased work of breathing using Per Protocol order mode. 7-10 - enter or revise RT Bronchodilator order(s) to two equivalent RT bronchodilator orders with one order with TID Frequency and one order with Frequency of every 4 hours PRN wheezing or increased work of breathing using Per Protocol order mode. 11-13 - enter or revise RT Bronchodilator order(s) to one equivalent RT bronchodilator order with QID Frequency and an Albuterol order with Frequency of every 4 hours PRN wheezing or increased work of breathing using Per Protocol order mode. Greater than 13 - enter or revise RT Bronchodilator order(s) to one equivalent RT bronchodilator order with every 4 hours Frequency and an Albuterol order with Frequency of every 2 hours PRN wheezing or increased work of breathing using Per Protocol order mode.          Electronically signed by Reed Steward RCP on 7/1/2022 at 7:51 AM

## 2022-07-01 NOTE — CONSULTS
Pharmacy Note  Vancomycin Consult    Harjit Mehta is a 80 y.o. female started on Vancomycin for aspiration pneumonia; consult received from Dr. Good Seen to manage therapy. Also receiving the following antibiotics: Meropenem. Allergies:  Levaquin [levofloxacin], Metoclopramide, Phenazopyridine, Pyridium [phenazopyridine hcl], Reglan [metoclopramide hcl], and Reglan [metoclopramide hcl]     Tmax: 101.4 degrees F    Recent Labs     06/30/22  1032 07/01/22  0709   CREATININE 2.5* 2.3*       Recent Labs     06/30/22  1032 07/01/22  0709   WBC 14.4* 13.9*       Estimated Creatinine Clearance: 26 mL/min (A) (based on SCr of 2.3 mg/dL (H)). Intake/Output Summary (Last 24 hours) at 7/1/2022 1508  Last data filed at 7/1/2022 1433  Gross per 24 hour   Intake 90 ml   Output 1675 ml   Net -1585 ml       Wt Readings from Last 1 Encounters:   06/29/22 259 lb 7.7 oz (117.7 kg)         Body mass index is 41.88 kg/m². Culture Date      Source                       Results      Loading dose (critically ill or in ICU, require dialysis or renal replacement therapy): Vancomycin 25 mg/kg IVPB x 1 (maximum 2500 mg). Maintenance dose: 15 mg/kg (maximum: 2000 mg/dose and 4500 mg/day) starting at the next dosing interval determined by renal function  Pulse dose: fluctuating renal function, FARHEEN, ESRD   Goal Vancomycin trough: 10-15 mcg/mL or 15-20 mcg/mL   Goal Vancomycin AUC: 400-600     Assessment/Plan:  Will initiate Vancomycin with a one time loading dose of 1500 mg x1, followed by pulse dosing. Vancomycin trough ordered for 7/2, 06:00. Timing of trough level will be determined based on culture results, renal function, and clinical response. Thank you for the consult.   KEATON Arroyo.Ph.7/1/20223:10 PM

## 2022-07-01 NOTE — PROGRESS NOTES
Pt brought to ICU 12. VSS  SpO2 97% on bipap 16/8 65%    PIV and PICC WDL. Flushed at this time. Plan of care and goals reviewed. Call light within reach. Bed in lowest position with wheels locked.

## 2022-07-01 NOTE — FLOWSHEET NOTE
07/01/22 0827   Vital Signs   Temp 98.1 °F (36.7 °C)   Temp Source Oral   Heart Rate (!) 121   Heart Rate Source Monitor   Resp 23   BP (!) 146/87   BP Location Left upper arm   BP Method Automatic   MAP (Calculated) 106.67   Oxygen Therapy   SpO2 91 %   O2 Device Nasal cannula       Pt assessment complete; see flow sheets. Pt very nauseas. Aspirated this morning in her BiPAP. MD made aware; CXR ordered. NPO for aspiration. PO meds held due to aspiration risk. Lung sounds are crackly and junkie. On 3L oxygen; none at baseline.  at bedside; updated on care plan. Pt resting at this time.     Daryl Arriaga RN

## 2022-07-01 NOTE — PROGRESS NOTES
Pulmonary & Critical Care Medicine ICU Progress Note    CC: shortness of breath     Events of Last 24 hours: On 3L O2. Had emesis and fever today. Emesis while on Bipap with likely aspiration last night. Became hypoxic and with resp distress with fever today and transferred to ICU after a rapid response. report of diarrhea last night  Vascular lines: IV:   6/19/2022 PICC    MV: 6/18/2022 -6/27/22  Vent Mode: AC/VC Resp Rate (Set): 0 bmp/Vt (Set, mL): 0 mL/ /FiO2 : 30 %  No results for input(s): PHART, CQD4SBE, PO2ART in the last 72 hours. IV:   dextrose 70 mL/hr at 07/01/22 1039    sodium chloride      sodium chloride         Vitals:  Blood pressure (!) 153/59, pulse (!) 133, temperature (!) 101.4 °F (38.6 °C), temperature source Axillary, resp. rate 22, height 5' 6\" (1.676 m), weight 259 lb 7.7 oz (117.7 kg), SpO2 92 %, not currently breastfeeding. On  3L  Constitutional:  In no distress. Eyes: PERRL. Conjunctivae anicteric. ENT: Normal nose. Normal tongue. Neck:  Trachea is midline. No thyroid tenderness. Respiratory: No accessory muscle usage. decreased breath sounds. No wheezes. No rales. + Rhonchi. Cardiovascular: Normal S1S2. No digit clubbing. No digit cyanosis. +  LE edema. Gastrointestinal: No mass palpated. No tenderness palpated. No umbilical hernia. Skin: No rash on the exposed extremities. No Nodules or induration on exposed extremities. Psychiatric: No anxiety or Agitation. Alert and Oriented to person, place and time.       Scheduled Meds:   fluconazole  200 mg IntraVENous Q24H    vancomycin  1,250 mg IntraVENous Once    meropenem  1,000 mg IntraVENous Q8H    sodium chloride nebulizer  3 mL Nebulization Q8H    metoprolol tartrate  25 mg Oral BID    ipratropium-albuterol  1 ampule Inhalation Q4H WA    iron sucrose (VENOFER) iv piggyback 100 mL (Admin over 60 minutes)  100 mg IntraVENous Q24H    pantoprazole  40 mg IntraVENous BID    lidocaine 1 % injection  5 mL IntraDERmal Once    chlorhexidine  15 mL Mouth/Throat BID    sodium chloride flush  10 mL IntraVENous 2 times per day    apixaban  2.5 mg Oral BID    atorvastatin  40 mg Oral Nightly    carbidopa-levodopa  1 tablet Oral 4x Daily    DULoxetine  60 mg Oral Daily    budesonide-formoterol  2 puff Inhalation BID    lamoTRIgine  50 mg Oral BID    montelukast  10 mg Oral Daily    propafenone  150 mg Oral 3 times per day       Data:  CBC:   Recent Labs     06/29/22  0505 06/30/22  1032 07/01/22  0709   WBC 15.1* 14.4* 13.9*   HGB 9.9* 10.1* 10.7*   HCT 31.0* 31.2* 33.0*   MCV 86.2 85.9 86.2    379 355     BMP:   Recent Labs     06/29/22  0505 06/30/22  1032 07/01/22  0709    146* 147*   K 3.7 3.5 3.5    111* 111*   CO2 19* 22 23   PHOS 4.1 3.5 3.0   BUN 56* 54* 47*   CREATININE 2.8* 2.5* 2.3*     LIVER PROFILE:   No results for input(s): AST, ALT, LIPASE, BILIDIR, BILITOT, ALKPHOS in the last 72 hours. Invalid input(s): AMYLASE,  ALB    Microbiology:  6/17/2022 SARS-CoV-2 and influenza are negative  6/17/2022 MRSA DNA screen positive  6/17/2022 blood NG  6/17/2022 sputum NRF  6/18/2022 tracheal aspirate NRF  6/19/2022 BAL Candida  6/19/2022 respiratory viral panel negative    Imaging:  ACT 6/23/22   Small right and very small effusions, diverticulosis, nonspecific rectal wall thickening    7/1/22 CXR  Persistent left lower lobe opacification and effusion.  Interval clearing of   perihilar edema and right basilar opacification and effusion. ASSESSMENT:  Pt aspirated overnight and has a fever today. CXR is actually improved. But she may be continuing to aspirate. RN also concerned for Cdiff.     · Acute hypoxemic respiratory failure  · Aspiration pneumonia  · BETSY on BiPAP 22/15 with 1 L bleed in   · Metabolic acidosis likely due to FARHEEN  · Parkinson's disease  · Atrial fibrillation  · Acute on chronic kidney failure, baseline creatinine 1.6  · Acute on chronic anemia, s/p 2 U PRBC 6/23/2022  · H/O esophagitis  · Pleural effusions are small bilaterally    PLAN:  Pan culture, cdiff  abg  Cxr, abd xray  Place on Bipap hospital unit rather than home unit  Supplemental oxygen to maintain SaO2 >92%; wean as tolerated   · S/p 10 days cefepime & vancomycin  · SLP following  · CCT 31 min

## 2022-07-01 NOTE — PROGRESS NOTES
CMU notified this RN that Pt's oxygen was at 83%. RN went in to assess Pt. Pt was lethargic but able to respond, follow commands and open eyes. Pt's skin felt very warm. Temp 102.6 axillary. Pt oxygen titrated up to 15L. Slowly recovering. Rapid response called. Placed back on BiPap; sent to ICU. Family/ updated.      Elias Sim RN

## 2022-07-01 NOTE — PROGRESS NOTES
Okay per MD to pull blood cultures from PICC. Done so at this time. ABX: vanc and cefepime ordered. Vanc hung at this time.     Leeann Puente RN

## 2022-07-01 NOTE — PROGRESS NOTES
SLP Attempt Note        Name: Marylou Mobley  : 1940  Medical Diagnosis: Shortness of breath [R06.02]  Septicemia ([address] [A41.9]  Chronic anemia [D64.9]  Acute respiratory failure with hypoxia (HCC) [J96.01]  Aspiration pneumonia of left lower lobe, unspecified aspiration pneumonia type ([address] [J69.0]  Chronic kidney disease, unspecified CKD stage [N18.9]    SLP attempted to see pt for f/u. According to pt's chart, this a.m. pt had emesis into BiPAP which she aspirated. Pt currently NPO for aspiration and CXR ordered. SLP spoke with RN who confirmed this and stated that pt remains NPO d/t nausea and vomiting. Will re-attempt pt at a later time pending NPO status and SLP schedule. No charges filed.  Thank you,    [address]  Clinician

## 2022-07-01 NOTE — PROGRESS NOTES
07/01/22 1952   NIV Type   Equipment Type v60   Mode Bilevel   Mask Type Full face mask   Mask Size Medium   Settings/Measurements   IPAP 16 cmH20   CPAP/EPAP 8 cmH2O   Rate Ordered 16   Resp (!) 34   FiO2  65 %   Vt Exhaled 660 mL   Minute Volume 22.1 Liters   Mask Leak (lpm) 1 lpm   Comfort Level Good   Using Accessory Muscles No   SpO2 98   Patient's Home Machine No   Patient Observation   Observations spo2 98% on 65% bipap   Oxygen Therapy/Pulse Ox   Heart Rate (!) 127   SpO2 98 %

## 2022-07-01 NOTE — FLOWSHEET NOTE
07/01/22 0057   Vital Signs   Temp 100.2 °F (37.9 °C)  (notifed RN)   Temp Source Axillary   Heart Rate (!) 107   Heart Rate Source Monitor   Resp 18   BP (!) 164/76   BP Location Right lower arm   BP Method Automatic   MAP (Calculated) 105.33   Patient Position Semi fowlers   Level of Consciousness Alert (0)   MEWS Score 2   Oxygen Therapy   SpO2 96 %   O2 Device PAP (positive airway pressure)     Pt awake in bed. VS as shown above. Pt denies any further needs at this time. Call light in reach and ebd in lowest position.

## 2022-07-01 NOTE — PROGRESS NOTES
Pt awake in bed. Assessment completed and medications given. VS as charted. A&Ox4. Pt denies any further needs atthis time. Call light in reach and bed in lowest position.

## 2022-07-01 NOTE — FLOWSHEET NOTE
7.01.2022/RAPID RESPONSE     07/01/22 1720   Encounter Summary   Encounter Overview/Reason  Crisis   Service Provided For: Patient   Referral/Consult From: Nilay   Last Encounter  07/01/22   Complexity of Encounter High   Begin Time 1700   End Time  1715   Total Time Calculated 15 min   Assessment/Intervention/Outcome   Intervention   (RAPID RESPONSE)   Outcome   (RAPID RESPONSE)

## 2022-07-01 NOTE — CARE COORDINATION
INTERDISCIPLINARY PLAN OF CARE CONFERENCE    Date/Time: 7/1/2022 10:09 AM  Completed by: Lou López RN, Case Management      Patient Name:  Jeramy Cosby  YOB: 1940  Admitting Diagnosis: Shortness of breath [R06.02]  Septicemia ([facility] [address] [A41.9]  Chronic anemia [D64.9]  Acute respiratory failure with hypoxia ([facility] [address] [J96.01]  Aspiration pneumonia of left lower lobe, unspecified aspiration pneumonia type ([facility] [address] [J69.0]  Chronic kidney disease, unspecified CKD stage [N18.9]     Admit Date/Time:  6/17/2022 12:50 AM    Chart reviewed. Interdisciplinary team contacted or reviewed plan related to patient progress and discharge plans. Disciplines included Case Management, Nursing, and Dietitian. Current Status:ongoing; n/v, Monitoring off of ARB; daily BiPAP  PT/OT recommendation for discharge plan of care: SNF    Expected D/C Disposition:  Skilled nursing facility  Discharge Plan Comments: Chart reviewed. Met with pt and spouse at bedside. Pt form home with spouse. Plan continues for STR. Pt would like   (1)[facility]  (2)[facility]    Both facilities are following as they are not able to meet pt needs at this time. No precert needed once accepted.        Home O2 in place on admit: ecf  Pt informed of need to bring portable home O2 tank on day of discharge for nursing to connect prior to leaving:  Not Indicated  Verbalized agreement/Understanding:  Not Indicated

## 2022-07-01 NOTE — PROGRESS NOTES
Physical Therapy / Occupational Therapy    RN requesting to hold PT/OT follow-up at this time due to medical status. Will re-attempt at later date.     Ollie Apgar, PT, DPT  Barby Ferraro, OTR/L

## 2022-07-01 NOTE — FLOWSHEET NOTE
07/01/22 1430   Vital Signs   Temp (!) 101.4 °F (38.6 °C)   Temp Source Axillary   Heart Rate (!) 133   Heart Rate Source Monitor   Resp 22   BP (!) 153/59   BP Location Right leg   MAP (Calculated) 90.33   Patient Position Semi fowlers   Level of Consciousness Alert (0)   MEWS Score 7   Oxygen Therapy   SpO2 92 %   O2 Device Nasal cannula   O2 Flow Rate (L/min) 3 L/min     Afternoon vitals completed. MD made aware of elevated temp. Pt repositioned and cleaned up a this time.  at bedside; updated on care plan. Pt stable.     Benita Almazan RN

## 2022-07-01 NOTE — PROGRESS NOTES
Unable to send C-Diff sample because patient does not meet the criteria. Pt has only had two BMs in the past 24hours.

## 2022-07-01 NOTE — PROGRESS NOTES
RRT called . Pt SAO2 reportedly 83% on 3 L. Pt placed on BIPAP SAO2 immediately climbed to 98%, Temp 102.6 axillary. Tyl supp given with rectal tube removal. See Note from Nurse at bedside.  Pt to ICU 12

## 2022-07-01 NOTE — PROGRESS NOTES
1704- Rapid response called. Patient lethargic and hypoxic  O2 saturations drop to 84% 3L. Titrated up, no response. 1705- non- re-breather placed. 1707- BiPAP placed back on     94% on BiAPAP  /82    Temp 102.6 axillary  Rhonchi breath sounds    X-rays and labs reviewed at bedside With IM team.  Plan to remove rectal tube and place tylenol suppository. STAT labs and chest xray ordered. Sent to ICU with transfer orders by Dr. Mauro Norton. Patient alert, responding to verbal stimuli.

## 2022-07-01 NOTE — PROGRESS NOTES
Pt  called and updated on pt condition. Notified that pt wanted to know when he would be here. He stated he is on his way and will be here in 20 mins.

## 2022-07-02 ENCOUNTER — APPOINTMENT (OUTPATIENT)
Dept: GENERAL RADIOLOGY | Age: 82
DRG: 004 | End: 2022-07-02
Payer: MEDICARE

## 2022-07-02 ENCOUNTER — APPOINTMENT (OUTPATIENT)
Dept: CT IMAGING | Age: 82
DRG: 004 | End: 2022-07-02
Payer: MEDICARE

## 2022-07-02 LAB
ANION GAP SERPL CALCULATED.3IONS-SCNC: 13 MMOL/L (ref 3–16)
BASOPHILS ABSOLUTE: 0.1 K/UL (ref 0–0.2)
BASOPHILS RELATIVE PERCENT: 0.2 %
BUN BLDV-MCNC: 47 MG/DL (ref 7–20)
CALCIUM SERPL-MCNC: 9.4 MG/DL (ref 8.3–10.6)
CHLORIDE BLD-SCNC: 107 MMOL/L (ref 99–110)
CO2: 24 MMOL/L (ref 21–32)
CREAT SERPL-MCNC: 2.4 MG/DL (ref 0.6–1.2)
EOSINOPHILS ABSOLUTE: 0 K/UL (ref 0–0.6)
EOSINOPHILS RELATIVE PERCENT: 0 %
GFR AFRICAN AMERICAN: 24
GFR NON-AFRICAN AMERICAN: 19
GLUCOSE BLD-MCNC: 119 MG/DL (ref 70–99)
GLUCOSE BLD-MCNC: 133 MG/DL (ref 70–99)
GLUCOSE BLD-MCNC: 136 MG/DL (ref 70–99)
GLUCOSE BLD-MCNC: 141 MG/DL (ref 70–99)
GLUCOSE BLD-MCNC: 149 MG/DL (ref 70–99)
HCT VFR BLD CALC: 31.3 % (ref 36–48)
HEMOGLOBIN: 10.1 G/DL (ref 12–16)
LYMPHOCYTES ABSOLUTE: 1.3 K/UL (ref 1–5.1)
LYMPHOCYTES RELATIVE PERCENT: 3.5 %
MAGNESIUM: 1.5 MG/DL (ref 1.8–2.4)
MCH RBC QN AUTO: 27.5 PG (ref 26–34)
MCHC RBC AUTO-ENTMCNC: 32.2 G/DL (ref 31–36)
MCV RBC AUTO: 85.5 FL (ref 80–100)
MONOCYTES ABSOLUTE: 1.2 K/UL (ref 0–1.3)
MONOCYTES RELATIVE PERCENT: 3.3 %
NEUTROPHILS ABSOLUTE: 34.6 K/UL (ref 1.7–7.7)
NEUTROPHILS RELATIVE PERCENT: 93 %
PDW BLD-RTO: 14.3 % (ref 12.4–15.4)
PERFORMED ON: ABNORMAL
PLATELET # BLD: 365 K/UL (ref 135–450)
PMV BLD AUTO: 8.2 FL (ref 5–10.5)
POTASSIUM SERPL-SCNC: 3.8 MMOL/L (ref 3.5–5.1)
RBC # BLD: 3.66 M/UL (ref 4–5.2)
SODIUM BLD-SCNC: 144 MMOL/L (ref 136–145)
VANCOMYCIN RANDOM: 20.4 UG/ML
WBC # BLD: 37.2 K/UL (ref 4–11)

## 2022-07-02 PROCEDURE — 2580000003 HC RX 258: Performed by: INTERNAL MEDICINE

## 2022-07-02 PROCEDURE — 6370000000 HC RX 637 (ALT 250 FOR IP): Performed by: INTERNAL MEDICINE

## 2022-07-02 PROCEDURE — 94640 AIRWAY INHALATION TREATMENT: CPT

## 2022-07-02 PROCEDURE — 2700000000 HC OXYGEN THERAPY PER DAY

## 2022-07-02 PROCEDURE — 6360000002 HC RX W HCPCS: Performed by: INTERNAL MEDICINE

## 2022-07-02 PROCEDURE — C9113 INJ PANTOPRAZOLE SODIUM, VIA: HCPCS | Performed by: INTERNAL MEDICINE

## 2022-07-02 PROCEDURE — 94003 VENT MGMT INPAT SUBQ DAY: CPT

## 2022-07-02 PROCEDURE — 85025 COMPLETE CBC W/AUTO DIFF WBC: CPT

## 2022-07-02 PROCEDURE — 74176 CT ABD & PELVIS W/O CONTRAST: CPT

## 2022-07-02 PROCEDURE — 2500000003 HC RX 250 WO HCPCS: Performed by: INTERNAL MEDICINE

## 2022-07-02 PROCEDURE — 80202 ASSAY OF VANCOMYCIN: CPT

## 2022-07-02 PROCEDURE — 99233 SBSQ HOSP IP/OBS HIGH 50: CPT | Performed by: INTERNAL MEDICINE

## 2022-07-02 PROCEDURE — 74018 RADEX ABDOMEN 1 VIEW: CPT

## 2022-07-02 PROCEDURE — 2000000000 HC ICU R&B

## 2022-07-02 PROCEDURE — 31720 CLEARANCE OF AIRWAYS: CPT

## 2022-07-02 PROCEDURE — 83735 ASSAY OF MAGNESIUM: CPT

## 2022-07-02 PROCEDURE — 94761 N-INVAS EAR/PLS OXIMETRY MLT: CPT

## 2022-07-02 PROCEDURE — 99291 CRITICAL CARE FIRST HOUR: CPT | Performed by: INTERNAL MEDICINE

## 2022-07-02 PROCEDURE — 94660 CPAP INITIATION&MGMT: CPT

## 2022-07-02 PROCEDURE — 80048 BASIC METABOLIC PNL TOTAL CA: CPT

## 2022-07-02 RX ORDER — MAGNESIUM SULFATE IN WATER 40 MG/ML
2000 INJECTION, SOLUTION INTRAVENOUS ONCE
Status: COMPLETED | OUTPATIENT
Start: 2022-07-02 | End: 2022-07-02

## 2022-07-02 RX ORDER — DEXMEDETOMIDINE HYDROCHLORIDE 4 UG/ML
.1-1.5 INJECTION, SOLUTION INTRAVENOUS CONTINUOUS
Status: DISCONTINUED | OUTPATIENT
Start: 2022-07-02 | End: 2022-07-03

## 2022-07-02 RX ADMIN — Medication 2 PUFF: at 07:51

## 2022-07-02 RX ADMIN — IPRATROPIUM BROMIDE AND ALBUTEROL SULFATE 1 AMPULE: 2.5; .5 SOLUTION RESPIRATORY (INHALATION) at 11:49

## 2022-07-02 RX ADMIN — MEROPENEM 1000 MG: 1 INJECTION, POWDER, FOR SOLUTION INTRAVENOUS at 05:25

## 2022-07-02 RX ADMIN — METOPROLOL TARTRATE 5 MG: 1 INJECTION, SOLUTION INTRAVENOUS at 21:14

## 2022-07-02 RX ADMIN — FLUCONAZOLE 200 MG: 2 INJECTION, SOLUTION INTRAVENOUS at 09:08

## 2022-07-02 RX ADMIN — DEXTROSE MONOHYDRATE: 50 INJECTION, SOLUTION INTRAVENOUS at 10:07

## 2022-07-02 RX ADMIN — SODIUM CHLORIDE, PRESERVATIVE FREE 10 ML: 5 INJECTION INTRAVENOUS at 08:50

## 2022-07-02 RX ADMIN — IRON SUCROSE 100 MG: 20 INJECTION, SOLUTION INTRAVENOUS at 07:44

## 2022-07-02 RX ADMIN — ISODIUM CHLORIDE 3 ML: 0.03 SOLUTION RESPIRATORY (INHALATION) at 07:51

## 2022-07-02 RX ADMIN — METOPROLOL TARTRATE 5 MG: 1 INJECTION, SOLUTION INTRAVENOUS at 03:54

## 2022-07-02 RX ADMIN — POTASSIUM CHLORIDE 10 MEQ: 7.45 INJECTION INTRAVENOUS at 00:42

## 2022-07-02 RX ADMIN — MEROPENEM 500 MG: 500 INJECTION, POWDER, FOR SOLUTION INTRAVENOUS at 17:14

## 2022-07-02 RX ADMIN — IPRATROPIUM BROMIDE AND ALBUTEROL SULFATE 1 AMPULE: 2.5; .5 SOLUTION RESPIRATORY (INHALATION) at 23:18

## 2022-07-02 RX ADMIN — ACETAMINOPHEN 650 MG: 650 SUPPOSITORY RECTAL at 06:09

## 2022-07-02 RX ADMIN — IPRATROPIUM BROMIDE AND ALBUTEROL SULFATE 1 AMPULE: 2.5; .5 SOLUTION RESPIRATORY (INHALATION) at 15:58

## 2022-07-02 RX ADMIN — MAGNESIUM SULFATE HEPTAHYDRATE 2000 MG: 40 INJECTION, SOLUTION INTRAVENOUS at 10:10

## 2022-07-02 RX ADMIN — Medication 15 ML: at 21:13

## 2022-07-02 RX ADMIN — PANTOPRAZOLE SODIUM 40 MG: 40 INJECTION, POWDER, LYOPHILIZED, FOR SOLUTION INTRAVENOUS at 21:13

## 2022-07-02 RX ADMIN — SODIUM CHLORIDE, PRESERVATIVE FREE 10 ML: 5 INJECTION INTRAVENOUS at 21:13

## 2022-07-02 RX ADMIN — PANTOPRAZOLE SODIUM 40 MG: 40 INJECTION, POWDER, LYOPHILIZED, FOR SOLUTION INTRAVENOUS at 08:51

## 2022-07-02 RX ADMIN — IPRATROPIUM BROMIDE AND ALBUTEROL SULFATE 1 AMPULE: 2.5; .5 SOLUTION RESPIRATORY (INHALATION) at 07:50

## 2022-07-02 RX ADMIN — Medication 0.2 MCG/KG/HR: at 19:53

## 2022-07-02 RX ADMIN — IPRATROPIUM BROMIDE AND ALBUTEROL SULFATE 1 AMPULE: 2.5; .5 SOLUTION RESPIRATORY (INHALATION) at 19:22

## 2022-07-02 RX ADMIN — PROPAFENONE HYDROCHLORIDE 150 MG: 150 TABLET, FILM COATED ORAL at 14:50

## 2022-07-02 RX ADMIN — METOPROLOL TARTRATE 5 MG: 1 INJECTION, SOLUTION INTRAVENOUS at 10:02

## 2022-07-02 RX ADMIN — METOPROLOL TARTRATE 5 MG: 1 INJECTION, SOLUTION INTRAVENOUS at 14:50

## 2022-07-02 ASSESSMENT — PAIN SCALES - GENERAL
PAINLEVEL_OUTOF10: 0

## 2022-07-02 NOTE — PROGRESS NOTES
Extended Infusion B-Lactam Antibiotics: Meropenem    Day: 2    Recent Labs     07/01/22  0709 07/01/22  1800 07/02/22  0431   CREATININE 2.3* 2.3* 2.4*     Estimated Creatinine Clearance: 25 mL/min (A) (based on SCr of 2.4 mg/dL (H)). Recent Labs     07/01/22  0709 07/01/22  1800 07/02/22  0431   WBC 13.9* 29.2* 37.2*       Meropenem-Extended Infusion (3-hour infusion) - Preferred Dosing Strategy   Renal function (CrCl mL/min) ? 50 26 - 52 10 - 25  < 10, HD, PD CRRT   All indications - Loading dose of 1047-2353 milligrams x 1 over 30 minutes or via IV push (based on indication). Maintenance dose should begin at the next regularly scheduled dosing interval based on indication/renal function. Maintenance dosing for all indications except as outlined below 1000mg q8h 1000mg q12h 500 mg q12h 500 mg q24h 1000mg q12h*   CNS infections, Cystic fibrosis, REJI > 4 2000mg q8h 2000mg q12h 1000mg q12h 1000mg q24h 2000mg q12h     Change to Merrem 500 mg IVPB every 12 hours over 3 hours for CrCl 10-25 ml/min per P&T extended-infusion antibiotic protocol. SrCr = 2.4;  CrCl = 25 ml/min.     Edith Le [facility] 7/2/2022 2:41 PM

## 2022-07-02 NOTE — FLOWSHEET NOTE
07/02/22 1700   Vital Signs   Temp 99.5 °F (37.5 °C)   Temp Source Bladder   Heart Rate (!) 132   Resp (!) 37   BP (!) 147/85   MAP (Calculated) 105.67   Level of Consciousness Alert (0)   MEWS Score 6   Oxygen Therapy   SpO2 96 %   Pt resting in bed, vitals per doc flow. Assessment complete see doc flow.  on ICU bedside monitor. SPO2 96% on BIPAP 16/8 @ 35% RR 37 Vt 518 with CSPO2 monitoring. Pt A&O x 4, nods correctly to orientation questions. RASS 0. PERRL. JOSE ANTONIO dual lumen  PICC WDL. Zimmerman secured draining clear yellow urine. Pt repositioned for comfort. Sons @ bedside, ICU monitoring in place.

## 2022-07-02 NOTE — PROGRESS NOTES
Hospitalist PCU Progress Note      PCP: Brock Barthel, MD    Date of Admission: 6/17/2022    Hospital Course:     admitted for resp failure from CHF , pna, and unable to manage own secretions with advanced parkinson's, eventually intubated    6/28-patient was extubated at 1:57 PM on 6/27/2022. She was then placed on BiPAP. She is getting periodic breaks from BiPAP. This morning she is on BiPAP and able to follow commands. O2 daytime and BiPAP HS. Transferred out of ICU 6/29    Prolonged intubated - there were discussions about trach and PEG support, though able to extubate eventually. Secretions remain a major problem. Resp culture (BAL) growing candida - started antifungal.       Repeat emesis 7/1 and spiking high fever and worsening O2 requirement - she was transferred back to ICU on 7/1 overnight. Subjective:     She is awake. Getting nebulizer treatments. Secretions remain a problem. She is on 3l/min. No repeat emesis.          Medications:  Reviewed    Infusion Medications    dextrose 75 mL/hr at 07/02/22 1155    sodium chloride      sodium chloride       Scheduled Medications    fluconazole  200 mg IntraVENous Q24H    meropenem  1,000 mg IntraVENous Q12H    sodium chloride nebulizer  3 mL Nebulization Q8H    vancomycin (VANCOCIN) intermittent dosing (placeholder)   Other RX Placeholder    metoprolol  5 mg IntraVENous Q6H    metoprolol tartrate  25 mg Oral BID    ipratropium-albuterol  1 ampule Inhalation Q4H WA    pantoprazole  40 mg IntraVENous BID    chlorhexidine  15 mL Mouth/Throat BID    sodium chloride flush  10 mL IntraVENous 2 times per day    apixaban  2.5 mg Oral BID    atorvastatin  40 mg Oral Nightly    carbidopa-levodopa  1 tablet Oral 4x Daily    DULoxetine  60 mg Oral Daily    budesonide-formoterol  2 puff Inhalation BID    lamoTRIgine  50 mg Oral BID    montelukast  10 mg Oral Daily    propafenone  150 mg Oral 3 times per day     PRN Meds: carboxymethylcellulose PF, sodium chloride, sodium chloride flush, sodium chloride, promethazine **OR** ondansetron, acetaminophen **OR** acetaminophen, albuterol      Intake/Output Summary (Last 24 hours) at 7/2/2022 1430  Last data filed at 7/2/2022 1216  Gross per 24 hour   Intake 3518.27 ml   Output 2075 ml   Net 1443.27 ml       Physical Exam Performed:    BP (!) 159/93   Pulse (!) 134   Temp (!) 101.2 °F (38.4 °C) (Bladder)   Resp (!) 39   Ht 5' 6\" (1.676 m)   Wt 259 lb 7.7 oz (117.7 kg)   SpO2 92%   BMI 41.88 kg/m²        General Appearance: Elderly white female conversant, weak, not in acute distress. Mucous Membranes:  Pink , anicteric  Neck: No JVD, no carotid bruit, no thyromegaly  Chest: no accessory muscle use. Coarse upper airwai sounds with rhonchi. Cardiovascular:  RRR S1S2 heard, no murmurs or gallops  Abdomen:  Soft, obese,  undistended, non tender, no organomegaly, BS present  Extremities: diffuse wesly 2 + edema to both LE now essentially resolved. 2+ pitting edema RUE persisting. Distal pulses well felt  Neurological : Awake and alert. Moves all 4 extremities. Labs:   Recent Labs     07/01/22  0709 07/01/22  1800 07/02/22  0431   WBC 13.9* 29.2* 37.2*   HGB 10.7* 10.2* 10.1*   HCT 33.0* 31.5* 31.3*    342 365     Recent Labs     06/30/22  1032 06/30/22  1032 07/01/22  0709 07/01/22  1800 07/02/22  0431   *   < > 147* 144 144   K 3.5   < > 3.5 3.4* 3.8   *   < > 111* 109 107   CO2 22   < > 23 22 24   BUN 54*   < > 47* 45* 47*   CREATININE 2.5*   < > 2.3* 2.3* 2.4*   CALCIUM 9.7   < > 9.4 9.3 9.4   PHOS 3.5  --  3.0 2.5  --     < > = values in this interval not displayed. No results for input(s): AST, ALT, BILIDIR, BILITOT, ALKPHOS in the last 72 hours. No results for input(s): INR in the last 72 hours. No results for input(s): Kathie Comber in the last 72 hours.     Urinalysis:      Lab Results   Component Value Date/Time    NITRU Negative 07/01/2022 10:58 PM    WBCUA 0-2 07/01/2022 10:58 PM    BACTERIA 2+ 06/22/2022 04:10 PM    RBCUA 3-4 07/01/2022 10:58 PM    BLOODU TRACE-LYSED 07/01/2022 10:58 PM    SPECGRAV 1.010 07/01/2022 10:58 PM    GLUCOSEU Negative 07/01/2022 10:58 PM       Radiology:  CT ABDOMEN PELVIS WO CONTRAST Additional Contrast? None   Final Result   1. Nonspecific, nonobstructive gaseous bowel pattern. 2. Extensive diverticulosis of the left colon, lacking acute inflammatory   changes. 3. Bibasilar consolidations, atelectasis versus pneumonia. Pleural effusions   have resolved since 06/23/2022. XR ABDOMEN (KUB) (SINGLE AP VIEW)   Preliminary Result   1. Gaseous distention of the stomach, otherwise, unremarkable bowel gas   pattern. XR CHEST PORTABLE   Final Result   Stable chest.  Persistent left lower lobe opacification with probable   effusion. XR CHEST PORTABLE   Final Result   Persistent left lower lobe opacification and effusion. Interval clearing of   perihilar edema and right basilar opacification and effusion. XR CHEST PORTABLE   Final Result   No significant interval change in bilateral airspace disease and small   effusions. Unchanged positioning of support devices. XR CHEST PORTABLE   Final Result   Increasing basilar opacities with probable small effusions. Unchanged positioning of support devices. XR CHEST PORTABLE   Final Result   1. No significant change. XR CHEST PORTABLE   Final Result   No significant interval change in basilar predominant airspace disease and   small left greater than right pleural effusions. Pulmonary vascular   congestion. CT ABDOMEN PELVIS WO CONTRAST Additional Contrast? None   Final Result   Colonic diverticulosis without acute diverticulitis. Nonspecific rectal wall   thickening. Partially visualized pleural effusions with adjacent compressive   atelectasis.          XR CHEST PORTABLE   Final Result   Again identified is bibasilar airspace disease with small pleural effusions   and mild increased right perihilar infiltrates. This may be related to   atelectasis or pneumonia. Cardiomegaly. XR CHEST PORTABLE   Final Result   Stable examination with bilateral small effusions and bibasilar atelectasis. Stable ET tube and enteric tube. XR CHEST PORTABLE   Final Result   No acute process. Bibasilar hypoaeration      Stable cardiomegaly         IR PICC WO SQ PORT/PUMP > 5 YEARS   Final Result      XR CHEST PORTABLE   Final Result   Supportive tubing projects in stable position. Partial regression of atelectasis in the right lung base. Persisting   consolidation or or atelectasis in the left lung base. XR CHEST PORTABLE   Final Result   1. Endotracheal tube terminates in appropriate position above the nirali. 2.  Enteric tube terminates in the body of the stomach. 3.  Basilar opacities again demonstrated, right greater than left. Interval   improved aeration of the left lung base. XR CHEST PORTABLE   Final Result   Increasing opacity in the right base, lobar atelectasis versus accumulating   right pleural effusion. Combination of both entities or underlying pneumonia   also possible. Persisting left basilar airspace disease and small pleural effusion. XR CHEST PORTABLE   Final Result   Hazy bilateral lobe airspace opacities and small left pleural effusion. This   could represent atelectasis or pneumonia in the appropriate clinical setting. CT CHEST WO CONTRAST   Final Result   1. Respiratory motion in the lower lungs. The right base has linear and   dependent atelectasis. The left does have atelectatic changes but could also   have some patchy opacities from pneumonia or aspiration at the left base. 2.  There is no pleural effusion or pneumothorax.       3.  Narrowing of the AP diameter of the trachea with bulging of the membranous portion due to the gas distended but nondilated esophagus. XR CHEST PORTABLE   Final Result   1. Bibasilar heterogeneous opacities may represent subsegmental atelectasis   and/or mild pulmonary edema. Underlying infection is difficult to exclude. Short-term follow-up PA and lateral chest radiographs may be helpful for   further evaluation. 2.  Cardiomegaly. Assessment/Plan:    Principal Problem:    Acute respiratory failure with hypoxia (HCC)  Active Problems:    Shortness of breath    Aspiration pneumonia of left lower lobe (HCC)    Mild protein-calorie malnutrition (HCC)    Chronic anemia    Parkinson disease (HCC)    Septicemia (HCC)    Chronic kidney disease  Resolved Problems:    * No resolved hospital problems. *           Acute Hypox Resp Failure resolving   Aspiration PNA ongoing. Continue IV Zosyn and vancomycin. Change Zosyn to cefepime. DC vancomycin 6/22. Finished 10 days of Vanco and cefepime  Intubated 6/18. Extubated on 6/27/2022.  -On BiPAP. Alternate BiPAP with nasal cannula oxygen.    - SLP involved. - resp culture from BAL is growing candida - started diflucan on 7/1/2022   - spiking high fever 7/1 - back on Abx with Vanc and merrem IV. - transferred back to ICU 7/1       Sepsis POA - due to above. resolved        Dysphagia. Abnormal CT  Cough with every meal intake per family report. Underlying Parkinson's. I ordered for MBS and esophagram, though these will unlikely be done till she is better clinically. SLP at bedside today. - on modified diet. - continues to struggle with secretions. - repeat emesis. - back to NPO status.    - CT abd pelvis 7/2 - actually fairly unremarkable. Melena  GI bleed   drop in H/H   GI consulted   H&H held stable. Has since resumed eliquis  IV PPI BID        FARHEEN on CKD IIIB  Had lasix gtt earlier this admission.    Does not appear fluid overloaded now and with no PO intake - off diuretics. Will need IVF - reassess closely.        Parkinson's disease  on Sinemet.         Afib Paroxysmal.   Rythmol   Resume Eliquis. This was held after she had bloody secretions during bronch, but has since been resumed.         HTN - BP on the low side. Nifedipine stopped due to this and also leg edema. Consider alternative when restarting.          DVT Prophylaxis: Eliquis  eliquis held for bloody secretions seen during bronch and melena  Diet: Diet NPO  Code Status: Limited    Cc. Prognosis guarded. Still struggling with secretions. Back to ICU 7/1. I had another conversation with her  - he told me she does not want to die. I explained to him that I am concerned that progressing Parkinson's and dysphagia are the culprits of her worsening clinical status. I expressed my concern that should she be reintubated she will unlikely wean off the vent and likely will need tracheostomy and PEG nutrition as discussed prior during her 2 week intubation period. He wanted to discuss further with the rest of the family before any code status changes.        John Starr MD 7/2/2022 2:30 PM

## 2022-07-02 NOTE — PROGRESS NOTES
7/2  Vanc trough = 20.4 mcg/mL at 0431. Hold vancomycin today. Recheck vanc random tomorrow in the AM (7/3 at 0600).   Cosmo Canavan, [place]  7/2/2022 6:17 AM

## 2022-07-02 NOTE — PROGRESS NOTES
07/02/22 0000   RT Protocol   History Pulmonary Disease 2   Respiratory pattern 2   Breath sounds 6   Cough 1   Indications for Bronchodilator Therapy On home bronchodilators   Bronchodilator Assessment Score 11

## 2022-07-02 NOTE — PROGRESS NOTES
Pulmonary & Critical Care Medicine ICU Progress Note    CC: shortness of breath     Events of Last 24 hours:   Developed a fever and resp distress yesterday. Remains febrile with increased leukocytosis. Remains on Bipap. No more N/V. No further diarrhea after rectal tube removed  Cdiff was canceled by the lab again  Vascular lines: IV:   6/19/2022 PICC    MV: 6/18/2022 -6/27/22  Vent Mode: AC/VC Resp Rate (Set): 0 bmp/Vt (Set, mL): 0 mL/ /FiO2 : 35 %  Recent Labs     07/01/22  1755   PHART 7.392   QBH4ESN 38.3   PO2ART 51.7*       IV:   dextrose 40 mL/hr at 07/02/22 0620    sodium chloride      sodium chloride         Vitals:  Blood pressure (!) 124/59, pulse (!) 123, temperature (!) 100.8 °F (38.2 °C), temperature source Bladder, resp. rate (!) 32, height 5' 6\" (1.676 m), weight 259 lb 7.7 oz (117.7 kg), SpO2 98 %, not currently breastfeeding. On  3L  Constitutional:  In no distress. Eyes: PERRL. Conjunctivae anicteric. ENT: Normal nose. Normal tongue. Neck:  Trachea is midline. No thyroid tenderness. Respiratory: + accessory muscle usage. decreased breath sounds. No wheezes. No rales. + Rhonchi. Cardiovascular: Normal S1S2. No digit clubbing. No digit cyanosis. +  LE edema. Gastrointestinal: No mass palpated. No tenderness palpated. No umbilical hernia. Skin: No rash on the exposed extremities. No Nodules or induration on exposed extremities. Psychiatric: No anxiety or Agitation. Alert and Oriented to person, place and time.       Scheduled Meds:   fluconazole  200 mg IntraVENous Q24H    meropenem  1,000 mg IntraVENous Q12H    sodium chloride nebulizer  3 mL Nebulization Q8H    vancomycin (VANCOCIN) intermittent dosing (placeholder)   Other RX Placeholder    metoprolol  5 mg IntraVENous Q6H    metoprolol tartrate  25 mg Oral BID    ipratropium-albuterol  1 ampule Inhalation Q4H WA    iron sucrose (VENOFER) iv piggyback 100 mL (Admin over 60 minutes)  100 mg IntraVENous Q24H    pantoprazole  40 mg IntraVENous BID    lidocaine 1 % injection  5 mL IntraDERmal Once    chlorhexidine  15 mL Mouth/Throat BID    sodium chloride flush  10 mL IntraVENous 2 times per day    apixaban  2.5 mg Oral BID    atorvastatin  40 mg Oral Nightly    carbidopa-levodopa  1 tablet Oral 4x Daily    DULoxetine  60 mg Oral Daily    budesonide-formoterol  2 puff Inhalation BID    lamoTRIgine  50 mg Oral BID    montelukast  10 mg Oral Daily    propafenone  150 mg Oral 3 times per day       Data:  CBC:   Recent Labs     07/01/22  0709 07/01/22  1800 07/02/22  0431   WBC 13.9* 29.2* 37.2*   HGB 10.7* 10.2* 10.1*   HCT 33.0* 31.5* 31.3*   MCV 86.2 85.2 85.5    342 365     BMP:   Recent Labs     06/30/22  1032 06/30/22  1032 07/01/22  0709 07/01/22  1800 07/02/22  0431   *   < > 147* 144 144   K 3.5   < > 3.5 3.4* 3.8   *   < > 111* 109 107   CO2 22   < > 23 22 24   PHOS 3.5  --  3.0 2.5  --    BUN 54*   < > 47* 45* 47*   CREATININE 2.5*   < > 2.3* 2.3* 2.4*    < > = values in this interval not displayed. LIVER PROFILE:   No results for input(s): AST, ALT, LIPASE, BILIDIR, BILITOT, ALKPHOS in the last 72 hours. Invalid input(s): AMYLASE,  ALB    Microbiology:  6/17/2022 SARS-CoV-2 and influenza are negative  6/17/2022 MRSA DNA screen positive  6/17/2022 blood NG  6/17/2022 sputum NRF  6/18/2022 tracheal aspirate NRF  6/19/2022 BAL Candida  6/19/2022 respiratory viral panel negative  7/1/22 Blood cx NGTD    Imaging:  ACT 6/23/22   Small right and very small effusions, diverticulosis, nonspecific rectal wall thickening    7/1/22 CXR  Persistent left lower lobe opacification and effusion.  Interval clearing of   perihilar edema and right basilar opacification and effusion.        ASSESSMENT:  · Fever: source not clear, CXR stable, need to r/o cdiff given fast rise in WBC and fever  · Gaseous distension on CXR  · Acute hypoxemic respiratory failure  · Aspiration pneumonia  · BETSY on BiPAP 22/15 with 1 L bleed in   · Metabolic acidosis likely due to FARHEEN  · Parkinson's disease  · Atrial fibrillation  · Acute on chronic kidney failure, baseline creatinine 1.6  · Acute on chronic anemia, s/p 2 U PRBC 6/23/2022  · H/O esophagitis  · Pleural effusions are small bilaterally    PLAN:  CT ABD  Bipap for respiratory distress  Supplemental oxygen to maintain SaO2 >92%; wean as tolerated   · S/p 10 days cefepime & vancomycin  · Started on Vanc and Merrem D#1 by IM  · D5 for hypernatremia  · F/u cx  · Cdiff canceled by lab  · SLP following  · Lovenox DVT prophylaxis  · Due to at least single organ failure and risk of rapid deterioration, I spent 35 minutes of Critical care time reviewing labs/films, examining patient, collaborating with other physicians. This does not include time performing critical procedures.

## 2022-07-02 NOTE — PROGRESS NOTES
Patient to CT scan on o2 and monitor, in bed with myself and transporter, no problems enroute to or from scanner, patient tolerated well with no problem.

## 2022-07-02 NOTE — PROGRESS NOTES
07/02/22 0337   NIV Type   NIV Started/Stopped On   Equipment Type v60   Mode Bilevel   Mask Type Full face mask   Mask Size Medium   Settings/Measurements   IPAP 16 cmH20   CPAP/EPAP 8 cmH2O   Rate Ordered 16   Resp (!) 32   FiO2  45 %  (decreased to 35%)   I Time/ I Time % 0.9 s   Vt Exhaled 1192 mL   Minute Volume 34.1 Liters   Mask Leak (lpm) 20 lpm   Comfort Level Good   Using Accessory Muscles No   SpO2 99

## 2022-07-02 NOTE — PROGRESS NOTES
Speech Language Pathology    Pt s/p rapid response 7/1 with transfer to ICU. Pt currently on BiPAP. ST to continue to follow and f/u as pt is able and appropriate.     Cesar Lewis M.S. Antony Pompa  Speech-language pathologist  [phone]

## 2022-07-02 NOTE — PROGRESS NOTES
[facility] Progress Note           Subjective/   80y.o. year old female who we are seeing in consultation for FARHEEN/CKD. HPI:  The patient was seen and examined; there were no acute events noted overnight. ROS: No fever or chills. Social: Family at bedside. Objective/   GEN:  Chronically ill, BP (!) 160/144   Pulse (!) 111   Temp (!) 101.2 °F (38.4 °C) (Bladder)   Resp (!) 43   Ht 5' 6\" (1.676 m)   Wt 259 lb 7.7 oz (117.7 kg)   SpO2 91%   BMI 41.88 kg/m²      HEENT: non-icteric, no JVD  CV: S1, S2 without m/r/g; ++ LE edema  RESP: Decreased breath sounds  ABD: +bs, soft, nt, no hsm  SKIN: warm, no rashes    Data/  Recent Labs     07/01/22  0709 07/01/22  1800 07/02/22  0431   WBC 13.9* 29.2* 37.2*   HGB 10.7* 10.2* 10.1*   HCT 33.0* 31.5* 31.3*   MCV 86.2 85.2 85.5    342 365     Recent Labs     06/30/22  1032 06/30/22  1032 07/01/22  0709 07/01/22  1800 07/02/22  0431   *   < > 147* 144 144   K 3.5   < > 3.5 3.4* 3.8   *   < > 111* 109 107   CO2 22   < > 23 22 24   GLUCOSE 146*   < > 157* 197* 149*   PHOS 3.5  --  3.0 2.5  --    MG 2.00  --  1.80  --  1.50*   BUN 54*   < > 47* 45* 47*   CREATININE 2.5*   < > 2.3* 2.3* 2.4*   LABGLOM 19*   < > 20* 20* 19*   GFRAA 22*   < > 25* 25* 24*    < > = values in this interval not displayed.      Component      Latest Ref Rng & Units 6/22/2022 6/22/2022           4:10 PM  4:10 PM   Color, UA      Straw/Yellow Yellow    Clarity, UA      Clear Clear    Glucose, UA      Negative mg/dL Negative    Bilirubin, Urine      Negative Negative    Ketones, Urine      Negative mg/dL TRACE (A)    Specific Gravity, UA      1.005 - 1.030 1.025    Blood, Urine      Negative Negative    pH, UA      5.0 - 8.0 5.5    Protein, UA      Negative mg/dL TRACE (A)    Urobilinogen, Urine      <2.0 E.U./dL 0.2    Nitrite, Urine      Negative Negative    Leukocyte Esterase, Urine      Negative Negative    Microscopic Examination       YES

## 2022-07-02 NOTE — PROGRESS NOTES
Noted increase in RR 30-40's despite being on BIPAP and -130s. Appears anxious. Perfect serve to nocturnist to update.

## 2022-07-02 NOTE — PROGRESS NOTES
Physical Therapy/Occupational Therapy    Pt s/p rapid response 7/1 with transfer to ICU. Pt currently on BiPAP. Hold at this time, will need new orders to resume therapy. No charge.     Sharyn Fraser, PT, DPT #[id number]  Amber Abad, [address] [place] [address], OTR/L   [id number]

## 2022-07-02 NOTE — PROGRESS NOTES
RT Inhaler-Nebulizer Bronchodilator Protocol Note    There is a bronchodilator order in the chart from a provider indicating to follow the RT Bronchodilator Protocol and there is an Initiate RT Inhaler-Nebulizer Bronchodilator Protocol order as well (see protocol at bottom of note). CXR Findings:  XR CHEST PORTABLE    Result Date: 7/1/2022  Stable chest.  Persistent left lower lobe opacification with probable effusion. XR CHEST PORTABLE    Result Date: 7/1/2022  Persistent left lower lobe opacification and effusion. Interval clearing of perihilar edema and right basilar opacification and effusion. The findings from the last RT Protocol Assessment were as follows:   History Pulmonary Disease: Chronic pulmonary disease  Respiratory Pattern: Severe use of accessory muscle or RR>30 bpm  Breath Sounds: Slightly diminished and/or crackles  Cough: Strong, productive  Indication for Bronchodilator Therapy: On home bronchodilators  Bronchodilator Assessment Score: 13    Aerosolized bronchodilator medication orders have been revised according to the RT Inhaler-Nebulizer Bronchodilator Protocol below. Respiratory Therapist to perform RT Therapy Protocol Assessment initially then follow the protocol. Repeat RT Therapy Protocol Assessment PRN for score 0-3 or on second treatment, BID, and PRN for scores above 3. No Indications - adjust the frequency to every 6 hours PRN wheezing or bronchospasm, if no treatments needed after 48 hours then discontinue using Per Protocol order mode. If indication present, adjust the RT bronchodilator orders based on the Bronchodilator Assessment Score as indicated below.   Use Inhaler orders unless patient has one or more of the following: on home nebulizer, not able to hold breath for 10 seconds, is not alert and oriented, cannot activate and use MDI correctly, or respiratory rate 25 breaths per minute or more, then use the equivalent nebulizer order(s) with same Frequency and PRN reasons based on the score. If a patient is on this medication at home then do not decrease Frequency below that used at home. 0-3 - enter or revise RT bronchodilator order(s) to equivalent RT Bronchodilator order with Frequency of every 4 hours PRN for wheezing or increased work of breathing using Per Protocol order mode. 4-6 - enter or revise RT Bronchodilator order(s) to two equivalent RT bronchodilator orders with one order with BID Frequency and one order with Frequency of every 4 hours PRN wheezing or increased work of breathing using Per Protocol order mode. 7-10 - enter or revise RT Bronchodilator order(s) to two equivalent RT bronchodilator orders with one order with TID Frequency and one order with Frequency of every 4 hours PRN wheezing or increased work of breathing using Per Protocol order mode. 11-13 - enter or revise RT Bronchodilator order(s) to one equivalent RT bronchodilator order with QID Frequency and an Albuterol order with Frequency of every 4 hours PRN wheezing or increased work of breathing using Per Protocol order mode. Greater than 13 - enter or revise RT Bronchodilator order(s) to one equivalent RT bronchodilator order with every 4 hours Frequency and an Albuterol order with Frequency of every 2 hours PRN wheezing or increased work of breathing using Per Protocol order mode.          Electronically signed by Brielle Padilla RCP on 7/2/2022 at 7:26 PM

## 2022-07-02 NOTE — PROGRESS NOTES
07/02/22 1922   NIV Type   NIV Started/Stopped On   Equipment Type V60   Mode Bilevel   Mask Type Full face mask   Mask Size Medium   Settings/Measurements   IPAP 16 cmH20   CPAP/EPAP 8 cmH2O   Rate Ordered 16   Resp (!) 38   Insp Rise Time (%) 2 %   FiO2  35 %   I Time/ I Time % 0.9 s   Vt Exhaled 624 mL   Minute Volume 24.5 Liters   Mask Leak (lpm) 0 lpm   Comfort Level Good   Using Accessory Muscles No   SpO2 96   Patient's Home Machine No   Oxygen Therapy/Pulse Ox   O2 Therapy Oxygen   O2 Device PAP (positive airway pressure)   Heart Rate (!) 118   SpO2 97 %

## 2022-07-03 ENCOUNTER — APPOINTMENT (OUTPATIENT)
Dept: CT IMAGING | Age: 82
DRG: 004 | End: 2022-07-03
Payer: MEDICARE

## 2022-07-03 ENCOUNTER — APPOINTMENT (OUTPATIENT)
Dept: GENERAL RADIOLOGY | Age: 82
DRG: 004 | End: 2022-07-03
Payer: MEDICARE

## 2022-07-03 LAB
ANION GAP SERPL CALCULATED.3IONS-SCNC: 17 MMOL/L (ref 3–16)
BASE EXCESS VENOUS: -3.5 MMOL/L (ref -3–3)
BASOPHILS ABSOLUTE: 0.1 K/UL (ref 0–0.2)
BASOPHILS RELATIVE PERCENT: 0.2 %
BUN BLDV-MCNC: 46 MG/DL (ref 7–20)
CALCIUM SERPL-MCNC: 9.4 MG/DL (ref 8.3–10.6)
CARBOXYHEMOGLOBIN: 1 % (ref 0–1.5)
CHLORIDE BLD-SCNC: 106 MMOL/L (ref 99–110)
CO2: 17 MMOL/L (ref 21–32)
CREAT SERPL-MCNC: 2.2 MG/DL (ref 0.6–1.2)
EOSINOPHILS ABSOLUTE: 0.2 K/UL (ref 0–0.6)
EOSINOPHILS RELATIVE PERCENT: 0.5 %
GFR AFRICAN AMERICAN: 26
GFR NON-AFRICAN AMERICAN: 21
GLUCOSE BLD-MCNC: 107 MG/DL (ref 70–99)
GLUCOSE BLD-MCNC: 124 MG/DL (ref 70–99)
GLUCOSE BLD-MCNC: 131 MG/DL (ref 70–99)
GLUCOSE BLD-MCNC: 133 MG/DL (ref 70–99)
GLUCOSE BLD-MCNC: 148 MG/DL (ref 70–99)
HCO3 VENOUS: 22.6 MMOL/L (ref 23–29)
HCT VFR BLD CALC: 33 % (ref 36–48)
HEMOGLOBIN: 10.5 G/DL (ref 12–16)
LACTIC ACID: 1.2 MMOL/L (ref 0.4–2)
LYMPHOCYTES ABSOLUTE: 1 K/UL (ref 1–5.1)
LYMPHOCYTES RELATIVE PERCENT: 3 %
MAGNESIUM: 2 MG/DL (ref 1.8–2.4)
MCH RBC QN AUTO: 27.1 PG (ref 26–34)
MCHC RBC AUTO-ENTMCNC: 31.7 G/DL (ref 31–36)
MCV RBC AUTO: 85.7 FL (ref 80–100)
METHEMOGLOBIN VENOUS: 0 %
MONOCYTES ABSOLUTE: 1 K/UL (ref 0–1.3)
MONOCYTES RELATIVE PERCENT: 2.9 %
NEUTROPHILS ABSOLUTE: 32.1 K/UL (ref 1.7–7.7)
NEUTROPHILS RELATIVE PERCENT: 93.4 %
O2 SAT, VEN: 84 %
O2 THERAPY: ABNORMAL
PCO2, VEN: 44.5 MMHG (ref 40–50)
PDW BLD-RTO: 14.3 % (ref 12.4–15.4)
PERFORMED ON: ABNORMAL
PH VENOUS: 7.32 (ref 7.35–7.45)
PLATELET # BLD: 328 K/UL (ref 135–450)
PMV BLD AUTO: 8.1 FL (ref 5–10.5)
PO2, VEN: 51.8 MMHG (ref 25–40)
POTASSIUM SERPL-SCNC: 3.4 MMOL/L (ref 3.5–5.1)
PROCALCITONIN: 0.93 NG/ML (ref 0–0.15)
RBC # BLD: 3.86 M/UL (ref 4–5.2)
REASON FOR REJECTION: NORMAL
REJECTED TEST: NORMAL
SODIUM BLD-SCNC: 140 MMOL/L (ref 136–145)
TCO2 CALC VENOUS: 24 MMOL/L
VANCOMYCIN RANDOM: 12.7 UG/ML
WBC # BLD: 34.4 K/UL (ref 4–11)

## 2022-07-03 PROCEDURE — C9113 INJ PANTOPRAZOLE SODIUM, VIA: HCPCS | Performed by: INTERNAL MEDICINE

## 2022-07-03 PROCEDURE — 2500000003 HC RX 250 WO HCPCS: Performed by: INTERNAL MEDICINE

## 2022-07-03 PROCEDURE — 2700000000 HC OXYGEN THERAPY PER DAY

## 2022-07-03 PROCEDURE — 84145 PROCALCITONIN (PCT): CPT

## 2022-07-03 PROCEDURE — 2580000003 HC RX 258: Performed by: INTERNAL MEDICINE

## 2022-07-03 PROCEDURE — 82310 ASSAY OF CALCIUM: CPT

## 2022-07-03 PROCEDURE — 83735 ASSAY OF MAGNESIUM: CPT

## 2022-07-03 PROCEDURE — 2000000000 HC ICU R&B

## 2022-07-03 PROCEDURE — 80048 BASIC METABOLIC PNL TOTAL CA: CPT

## 2022-07-03 PROCEDURE — 82803 BLOOD GASES ANY COMBINATION: CPT

## 2022-07-03 PROCEDURE — 36415 COLL VENOUS BLD VENIPUNCTURE: CPT

## 2022-07-03 PROCEDURE — 71045 X-RAY EXAM CHEST 1 VIEW: CPT

## 2022-07-03 PROCEDURE — 94640 AIRWAY INHALATION TREATMENT: CPT

## 2022-07-03 PROCEDURE — 94761 N-INVAS EAR/PLS OXIMETRY MLT: CPT

## 2022-07-03 PROCEDURE — 6370000000 HC RX 637 (ALT 250 FOR IP): Performed by: INTERNAL MEDICINE

## 2022-07-03 PROCEDURE — 85025 COMPLETE CBC W/AUTO DIFF WBC: CPT

## 2022-07-03 PROCEDURE — 99291 CRITICAL CARE FIRST HOUR: CPT | Performed by: INTERNAL MEDICINE

## 2022-07-03 PROCEDURE — 93005 ELECTROCARDIOGRAM TRACING: CPT | Performed by: INTERNAL MEDICINE

## 2022-07-03 PROCEDURE — 94660 CPAP INITIATION&MGMT: CPT

## 2022-07-03 PROCEDURE — 80202 ASSAY OF VANCOMYCIN: CPT

## 2022-07-03 PROCEDURE — 83605 ASSAY OF LACTIC ACID: CPT

## 2022-07-03 PROCEDURE — 31720 CLEARANCE OF AIRWAYS: CPT

## 2022-07-03 PROCEDURE — 84132 ASSAY OF SERUM POTASSIUM: CPT

## 2022-07-03 PROCEDURE — 71250 CT THORAX DX C-: CPT

## 2022-07-03 PROCEDURE — 99233 SBSQ HOSP IP/OBS HIGH 50: CPT | Performed by: INTERNAL MEDICINE

## 2022-07-03 PROCEDURE — 6360000002 HC RX W HCPCS: Performed by: INTERNAL MEDICINE

## 2022-07-03 RX ORDER — POTASSIUM CHLORIDE 750 MG/1
20 TABLET, EXTENDED RELEASE ORAL ONCE
Status: COMPLETED | OUTPATIENT
Start: 2022-07-03 | End: 2022-07-03

## 2022-07-03 RX ORDER — MORPHINE SULFATE 2 MG/ML
1 INJECTION, SOLUTION INTRAMUSCULAR; INTRAVENOUS EVERY 4 HOURS PRN
Status: DISCONTINUED | OUTPATIENT
Start: 2022-07-03 | End: 2022-07-18

## 2022-07-03 RX ADMIN — VANCOMYCIN HYDROCHLORIDE 750 MG: 750 INJECTION, POWDER, LYOPHILIZED, FOR SOLUTION INTRAVENOUS at 08:38

## 2022-07-03 RX ADMIN — PANTOPRAZOLE SODIUM 40 MG: 40 INJECTION, POWDER, LYOPHILIZED, FOR SOLUTION INTRAVENOUS at 20:04

## 2022-07-03 RX ADMIN — ACETAMINOPHEN 325MG 650 MG: 325 TABLET ORAL at 11:05

## 2022-07-03 RX ADMIN — SODIUM CHLORIDE, PRESERVATIVE FREE 10 ML: 5 INJECTION INTRAVENOUS at 09:37

## 2022-07-03 RX ADMIN — METOPROLOL TARTRATE 5 MG: 1 INJECTION, SOLUTION INTRAVENOUS at 09:29

## 2022-07-03 RX ADMIN — METOPROLOL TARTRATE 25 MG: 25 TABLET, FILM COATED ORAL at 20:03

## 2022-07-03 RX ADMIN — POTASSIUM CHLORIDE 20 MEQ: 750 TABLET, EXTENDED RELEASE ORAL at 14:33

## 2022-07-03 RX ADMIN — DULOXETINE HYDROCHLORIDE 60 MG: 60 CAPSULE, DELAYED RELEASE ORAL at 09:16

## 2022-07-03 RX ADMIN — LAMOTRIGINE 50 MG: 25 TABLET ORAL at 09:30

## 2022-07-03 RX ADMIN — LAMOTRIGINE 50 MG: 25 TABLET ORAL at 20:04

## 2022-07-03 RX ADMIN — Medication 2 PUFF: at 07:37

## 2022-07-03 RX ADMIN — METOPROLOL TARTRATE 5 MG: 1 INJECTION, SOLUTION INTRAVENOUS at 15:57

## 2022-07-03 RX ADMIN — IPRATROPIUM BROMIDE AND ALBUTEROL SULFATE 1 AMPULE: 2.5; .5 SOLUTION RESPIRATORY (INHALATION) at 22:51

## 2022-07-03 RX ADMIN — ATORVASTATIN CALCIUM 40 MG: 40 TABLET, FILM COATED ORAL at 20:03

## 2022-07-03 RX ADMIN — DEXTROSE MONOHYDRATE: 50 INJECTION, SOLUTION INTRAVENOUS at 20:10

## 2022-07-03 RX ADMIN — MEROPENEM 1000 MG: 1 INJECTION, POWDER, FOR SOLUTION INTRAVENOUS at 17:31

## 2022-07-03 RX ADMIN — CARBIDOPA AND LEVODOPA 1 TABLET: 25; 100 TABLET ORAL at 17:28

## 2022-07-03 RX ADMIN — APIXABAN 2.5 MG: 5 TABLET, FILM COATED ORAL at 20:03

## 2022-07-03 RX ADMIN — MONTELUKAST SODIUM 10 MG: 10 TABLET, COATED ORAL at 09:18

## 2022-07-03 RX ADMIN — SODIUM BICARBONATE 50 MEQ: 84 INJECTION INTRAVENOUS at 14:31

## 2022-07-03 RX ADMIN — MORPHINE SULFATE 1 MG: 2 INJECTION, SOLUTION INTRAMUSCULAR; INTRAVENOUS at 11:57

## 2022-07-03 RX ADMIN — APIXABAN 2.5 MG: 5 TABLET, FILM COATED ORAL at 09:18

## 2022-07-03 RX ADMIN — MEROPENEM 500 MG: 500 INJECTION, POWDER, FOR SOLUTION INTRAVENOUS at 05:57

## 2022-07-03 RX ADMIN — Medication 15 ML: at 20:03

## 2022-07-03 RX ADMIN — FLUCONAZOLE 200 MG: 2 INJECTION, SOLUTION INTRAVENOUS at 09:41

## 2022-07-03 RX ADMIN — METOPROLOL TARTRATE 25 MG: 25 TABLET, FILM COATED ORAL at 09:18

## 2022-07-03 RX ADMIN — IPRATROPIUM BROMIDE AND ALBUTEROL SULFATE 1 AMPULE: 2.5; .5 SOLUTION RESPIRATORY (INHALATION) at 15:30

## 2022-07-03 RX ADMIN — MORPHINE SULFATE 1 MG: 2 INJECTION, SOLUTION INTRAMUSCULAR; INTRAVENOUS at 22:12

## 2022-07-03 RX ADMIN — CARBIDOPA AND LEVODOPA 1 TABLET: 25; 100 TABLET ORAL at 14:33

## 2022-07-03 RX ADMIN — DEXTROSE MONOHYDRATE: 50 INJECTION, SOLUTION INTRAVENOUS at 03:05

## 2022-07-03 RX ADMIN — IPRATROPIUM BROMIDE AND ALBUTEROL SULFATE 1 AMPULE: 2.5; .5 SOLUTION RESPIRATORY (INHALATION) at 07:37

## 2022-07-03 RX ADMIN — PANTOPRAZOLE SODIUM 40 MG: 40 INJECTION, POWDER, LYOPHILIZED, FOR SOLUTION INTRAVENOUS at 09:30

## 2022-07-03 RX ADMIN — CARBIDOPA AND LEVODOPA 1 TABLET: 25; 100 TABLET ORAL at 09:18

## 2022-07-03 RX ADMIN — PROPAFENONE HYDROCHLORIDE 150 MG: 150 TABLET, FILM COATED ORAL at 14:33

## 2022-07-03 RX ADMIN — SODIUM CHLORIDE, PRESERVATIVE FREE 10 ML: 5 INJECTION INTRAVENOUS at 20:03

## 2022-07-03 RX ADMIN — PROPAFENONE HYDROCHLORIDE 150 MG: 150 TABLET, FILM COATED ORAL at 22:12

## 2022-07-03 RX ADMIN — METOPROLOL TARTRATE 5 MG: 1 INJECTION, SOLUTION INTRAVENOUS at 04:14

## 2022-07-03 RX ADMIN — CARBIDOPA AND LEVODOPA 1 TABLET: 25; 100 TABLET ORAL at 20:03

## 2022-07-03 RX ADMIN — IPRATROPIUM BROMIDE AND ALBUTEROL SULFATE 1 AMPULE: 2.5; .5 SOLUTION RESPIRATORY (INHALATION) at 19:13

## 2022-07-03 RX ADMIN — METOPROLOL TARTRATE 5 MG: 1 INJECTION, SOLUTION INTRAVENOUS at 22:12

## 2022-07-03 ASSESSMENT — PAIN SCALES - GENERAL
PAINLEVEL_OUTOF10: 7
PAINLEVEL_OUTOF10: 6
PAINLEVEL_OUTOF10: 7
PAINLEVEL_OUTOF10: 0
PAINLEVEL_OUTOF10: 0

## 2022-07-03 ASSESSMENT — PAIN DESCRIPTION - LOCATION
LOCATION: CHEST
LOCATION: GENERALIZED

## 2022-07-03 NOTE — PROGRESS NOTES
RT Nebulizer Bronchodilator Protocol Note    There is a bronchodilator order in the chart from a provider indicating to follow the RT Bronchodilator Protocol and there is an Initiate RT Bronchodilator Protocol order as well (see protocol at bottom of note). CXR Findings:  XR CHEST PORTABLE    Result Date: 7/1/2022  Stable chest.  Persistent left lower lobe opacification with probable effusion. XR CHEST PORTABLE    Result Date: 7/1/2022  Persistent left lower lobe opacification and effusion. Interval clearing of perihilar edema and right basilar opacification and effusion. The findings from the last RT Protocol Assessment were as follows:  Smoking: Chronic pulmonary disease  Respiratory Pattern: Severe use of accessory muscle or RR>30 bpm  Breath Sounds: Slightly diminished and/or crackles  Cough: Weak, non-productive  Indication for Bronchodilator Therapy: On home bronchodilators  Bronchodilator Assessment Score: 15    Aerosolized bronchodilator medication orders have been revised according to the RT Nebulizer Bronchodilator Protocol below. Respiratory Therapist to perform RT Therapy Protocol Assessment initially then follow the protocol. Repeat RT Therapy Protocol Assessment PRN for score 0-3 or on second treatment, BID, and PRN for scores above 3. No Indications - adjust the frequency to every 6 hours PRN wheezing or bronchospasm, if no treatments needed after 48 hours then discontinue using Per Protocol order mode. If indication present, adjust the RT bronchodilator orders based on the Bronchodilator Assessment Score as indicated below. If a patient is on this medication at home then do not decrease Frequency below that used at home. 0-3 - enter or revise RT bronchodilator order(s) to equivalent RT Bronchodilator order with Frequency of every 4 hours PRN for wheezing or increased work of breathing using Per Protocol order mode.        4-6 - enter or revise RT Bronchodilator order(s) to two equivalent RT bronchodilator orders with one order with BID Frequency and one order with Frequency of every 4 hours PRN wheezing or increased work of breathing using Per Protocol order mode. 7-10 - enter or revise RT Bronchodilator order(s) to two equivalent RT bronchodilator orders with one order with TID Frequency and one order with Frequency of every 4 hours PRN wheezing or increased work of breathing using Per Protocol order mode. 11-13 - enter or revise RT Bronchodilator order(s) to one equivalent RT bronchodilator order with QID Frequency and an Albuterol order with Frequency of every 4 hours PRN wheezing or increased work of breathing using Per Protocol order mode. Greater than 13 - enter or revise RT Bronchodilator order(s) to one equivalent RT bronchodilator order with every 4 hours Frequency and an Albuterol order with Frequency of every 2 hours PRN wheezing or increased work of breathing using Per Protocol order mode. RT to enter RT Home Evaluation for COPD & MDI Assessment order using Per Protocol order mode.     Electronically signed by Monica Oleary RCP on 7/3/2022 at 7:38 AM

## 2022-07-03 NOTE — PROGRESS NOTES
Assessment completed, patient awake and alert, VSS, some tachycardia present, no needs per patient at present.

## 2022-07-03 NOTE — PROGRESS NOTES
RT Inhaler-Nebulizer Bronchodilator Protocol Note    There is a bronchodilator order in the chart from a provider indicating to follow the RT Bronchodilator Protocol and there is an Initiate RT Inhaler-Nebulizer Bronchodilator Protocol order as well (see protocol at bottom of note). CXR Findings:  XR CHEST PORTABLE    Result Date: 7/3/2022  Airspace disease within the mid to lower lung zones is slightly increased as compared to prior. Possible small left pleural effusion. The findings from the last RT Protocol Assessment were as follows:   History Pulmonary Disease: Chronic pulmonary disease  Respiratory Pattern: Severe use of accessory muscle or RR>30 bpm  Breath Sounds: Slightly diminished and/or crackles  Cough: Weak, productive  Indication for Bronchodilator Therapy: On home bronchodilators  Bronchodilator Assessment Score: 14    Aerosolized bronchodilator medication orders have been revised according to the RT Inhaler-Nebulizer Bronchodilator Protocol below. Respiratory Therapist to perform RT Therapy Protocol Assessment initially then follow the protocol. Repeat RT Therapy Protocol Assessment PRN for score 0-3 or on second treatment, BID, and PRN for scores above 3. No Indications - adjust the frequency to every 6 hours PRN wheezing or bronchospasm, if no treatments needed after 48 hours then discontinue using Per Protocol order mode. If indication present, adjust the RT bronchodilator orders based on the Bronchodilator Assessment Score as indicated below. Use Inhaler orders unless patient has one or more of the following: on home nebulizer, not able to hold breath for 10 seconds, is not alert and oriented, cannot activate and use MDI correctly, or respiratory rate 25 breaths per minute or more, then use the equivalent nebulizer order(s) with same Frequency and PRN reasons based on the score.   If a patient is on this medication at home then do not decrease Frequency below that used at home.    0-3 - enter or revise RT bronchodilator order(s) to equivalent RT Bronchodilator order with Frequency of every 4 hours PRN for wheezing or increased work of breathing using Per Protocol order mode. 4-6 - enter or revise RT Bronchodilator order(s) to two equivalent RT bronchodilator orders with one order with BID Frequency and one order with Frequency of every 4 hours PRN wheezing or increased work of breathing using Per Protocol order mode. 7-10 - enter or revise RT Bronchodilator order(s) to two equivalent RT bronchodilator orders with one order with TID Frequency and one order with Frequency of every 4 hours PRN wheezing or increased work of breathing using Per Protocol order mode. 11-13 - enter or revise RT Bronchodilator order(s) to one equivalent RT bronchodilator order with QID Frequency and an Albuterol order with Frequency of every 4 hours PRN wheezing or increased work of breathing using Per Protocol order mode. Greater than 13 - enter or revise RT Bronchodilator order(s) to one equivalent RT bronchodilator order with every 4 hours Frequency and an Albuterol order with Frequency of every 2 hours PRN wheezing or increased work of breathing using Per Protocol order mode.        Electronically signed by Luis Aaron RCP on 7/3/2022 at 7:15 PM

## 2022-07-03 NOTE — PROGRESS NOTES
7/3  Vanc random = 12.7 mcg/mL at 0625. Give vancomycin 750 mg x1. Recheck vanc random tomorrow in the AM (7/4 at 0600).   Xin Levy, PharmD  7/3/2022 7:05 AM

## 2022-07-03 NOTE — PROGRESS NOTES
Hospitalist PCU Progress Note      PCP: Andreina Osullivan MD    Date of Admission: 6/17/2022    Hospital Course:     admitted for resp failure from CHF , pna, and unable to manage own secretions with advanced parkinson's, eventually intubated    6/28-patient was extubated at 1:57 PM on 6/27/2022. She was then placed on BiPAP. She is getting periodic breaks from BiPAP. This morning she is on BiPAP and able to follow commands. O2 daytime and BiPAP HS. Transferred out of ICU 6/29    Prolonged intubated - there were discussions about trach and PEG support, though able to extubate eventually. Secretions remain a major problem. Resp culture (BAL) growing candida - started antifungal.       Repeat emesis 7/1 and spiking high fever and worsening O2 requirement - she was transferred back to ICU on 7/1 overnight. Subjective:     She is awake. Bringing up thick white secretions. Complains of left mid to lower chest pain with pleurisy today. Oxygenation 100%. No diarrhea.            Medications:  Reviewed    Infusion Medications    dextrose 75 mL/hr at 07/03/22 0054    sodium chloride      sodium chloride       Scheduled Medications    meropenem  500 mg IntraVENous Q12H    fluconazole  200 mg IntraVENous Q24H    vancomycin (VANCOCIN) intermittent dosing (placeholder)   Other RX Placeholder    metoprolol  5 mg IntraVENous Q6H    metoprolol tartrate  25 mg Oral BID    ipratropium-albuterol  1 ampule Inhalation Q4H WA    pantoprazole  40 mg IntraVENous BID    chlorhexidine  15 mL Mouth/Throat BID    sodium chloride flush  10 mL IntraVENous 2 times per day    apixaban  2.5 mg Oral BID    atorvastatin  40 mg Oral Nightly    carbidopa-levodopa  1 tablet Oral 4x Daily    DULoxetine  60 mg Oral Daily    lamoTRIgine  50 mg Oral BID    montelukast  10 mg Oral Daily    propafenone  150 mg Oral 3 times per day     PRN Meds: morphine, carboxymethylcellulose PF, sodium chloride, sodium chloride flush, sodium chloride, promethazine **OR** ondansetron, acetaminophen **OR** acetaminophen, albuterol      Intake/Output Summary (Last 24 hours) at 7/3/2022 1104  Last data filed at 7/3/2022 1812  Gross per 24 hour   Intake 1964.12 ml   Output 1225 ml   Net 739.12 ml       Physical Exam Performed:    /71   Pulse 86   Temp 98.4 °F (36.9 °C) (Axillary)   Resp 27   Ht 5' 6\" (1.676 m)   Wt 259 lb 7.7 oz (117.7 kg)   SpO2 98%   BMI 41.88 kg/m²        General Appearance: Elderly white female conversant, weak, not in acute distress. Mucous Membranes:  Pink , anicteric  Neck: No JVD, no carotid bruit, no thyromegaly  Chest: no accessory muscle use. Coarse upper airwai sounds with rhonchi. Cardiovascular:  RRR S1S2 heard, no murmurs or gallops  Abdomen:  Soft, obese,  undistended, non tender, no organomegaly, BS present  Extremities: diffuse wesly 2 + edema to both LE now essentially resolved. 2+ pitting edema RUE persisting. Distal pulses well felt  Neurological : Awake and alert. Moves all 4 extremities. Labs:   Recent Labs     07/01/22  1800 07/02/22  0431 07/03/22  0745   WBC 29.2* 37.2* 34.4*   HGB 10.2* 10.1* 10.5*   HCT 31.5* 31.3* 33.0*    365 328     Recent Labs     07/01/22  0709 07/01/22  0709 07/01/22  1800 07/02/22  0431 07/03/22  0625 07/03/22  0745   *   < > 144 144 140  --    K 3.5   < > 3.4* 3.8  --  3.4*   *   < > 109 107 106  --    CO2 23   < > 22 24 17*  --    BUN 47*   < > 45* 47* 46*  --    CREATININE 2.3*   < > 2.3* 2.4* 2.2*  --    CALCIUM 9.4   < > 9.3 9.4  --  9.4   PHOS 3.0  --  2.5  --   --   --     < > = values in this interval not displayed. No results for input(s): AST, ALT, BILIDIR, BILITOT, ALKPHOS in the last 72 hours. No results for input(s): INR in the last 72 hours. No results for input(s): Assunta Torres in the last 72 hours.     Urinalysis:      Lab Results   Component Value Date/Time    NITRU Negative 07/01/2022 10:58 PM    WBCUA 0-2 07/01/2022 10:58 PM    BACTERIA 2+ 06/22/2022 04:10 PM    RBCUA 3-4 07/01/2022 10:58 PM    BLOODU TRACE-LYSED 07/01/2022 10:58 PM    SPECGRAV 1.010 07/01/2022 10:58 PM    GLUCOSEU Negative 07/01/2022 10:58 PM       Radiology:  CT ABDOMEN PELVIS WO CONTRAST Additional Contrast? None   Final Result   1. Nonspecific, nonobstructive gaseous bowel pattern. 2. Extensive diverticulosis of the left colon, lacking acute inflammatory   changes. 3. Bibasilar consolidations, atelectasis versus pneumonia. Pleural effusions   have resolved since 06/23/2022. XR ABDOMEN (KUB) (SINGLE AP VIEW)   Final Result   1. Gaseous distention of the stomach, otherwise, unremarkable bowel gas   pattern. XR CHEST PORTABLE   Final Result   Stable chest.  Persistent left lower lobe opacification with probable   effusion. XR CHEST PORTABLE   Final Result   Persistent left lower lobe opacification and effusion. Interval clearing of   perihilar edema and right basilar opacification and effusion. XR CHEST PORTABLE   Final Result   No significant interval change in bilateral airspace disease and small   effusions. Unchanged positioning of support devices. XR CHEST PORTABLE   Final Result   Increasing basilar opacities with probable small effusions. Unchanged positioning of support devices. XR CHEST PORTABLE   Final Result   1. No significant change. XR CHEST PORTABLE   Final Result   No significant interval change in basilar predominant airspace disease and   small left greater than right pleural effusions. Pulmonary vascular   congestion. CT ABDOMEN PELVIS WO CONTRAST Additional Contrast? None   Final Result   Colonic diverticulosis without acute diverticulitis. Nonspecific rectal wall   thickening. Partially visualized pleural effusions with adjacent compressive   atelectasis.          XR CHEST PORTABLE   Final Result   Again identified is to the gas distended but nondilated esophagus. XR CHEST PORTABLE   Final Result   1. Bibasilar heterogeneous opacities may represent subsegmental atelectasis   and/or mild pulmonary edema. Underlying infection is difficult to exclude. Short-term follow-up PA and lateral chest radiographs may be helpful for   further evaluation. 2.  Cardiomegaly. XR CHEST PORTABLE    (Results Pending)   CT CHEST WO CONTRAST    (Results Pending)           Assessment/Plan:    Principal Problem:    Acute respiratory failure with hypoxia (HCC)  Active Problems:    Shortness of breath    Aspiration pneumonia of left lower lobe (HCC)    Mild protein-calorie malnutrition (HCC)    Chronic anemia    Parkinson disease (HCC)    Septicemia (HCC)    Chronic kidney disease  Resolved Problems:    * No resolved hospital problems. *           Acute Hypox Resp Failure resolving   Aspiration PNA ongoing. Continue IV Zosyn and vancomycin. Change Zosyn to cefepime. DC vancomycin 6/22. Finished 10 days of Vanco and cefepime  Intubated 6/18. Extubated on 6/27/2022.  -On BiPAP. Alternate BiPAP with nasal cannula oxygen.    - SLP involved. - resp culture from BAL is growing candida - started diflucan on 7/1/2022   - spiking high fever 7/1 - back on Abx with Vanc and merrem IV. - transferred back to ICU 7/1       Sepsis POA - due to above. Recurrent.         Dysphagia. Abnormal CT  Cough with every meal intake per family report. Underlying Parkinson's. I ordered for MBS and esophagram, though these will unlikely be done till she is better clinically. Failing bedside SLP   - continues to struggle with secretions. - repeat emesis. - back to NPO status.    - CT abd pelvis 7/2 - actually fairly unremarkable. Chest Pain. Pleuritic chest pain left mid lower chest.   EKG - back in afib but without signs of active ischemia.    Give PRN morphine low dose for pain and get CT chest 7/3 eval for parapneumonic effusion. Melena  GI bleed   drop in H/H   GI consulted   H&H held stable. Has since resumed eliquis  IV PPI BID        FARHEEN on CKD IIIB  Had lasix gtt earlier this admission. Does not appear fluid overloaded now and with no PO intake - off diuretics. Will need IVF - reassess closely.        Parkinson's disease  on Sinemet.         Afib Paroxysmal - back in Afib this am - rate controlled though. Rythmol   Resumed Eliquis. This was held after she had bloody secretions during bronch, but has since been resumed.           HTN - BP on the low side. Nifedipine stopped due to this and also leg edema. Consider alternative when restarting.            DVT Prophylaxis: Eliquis  eliquis held for bloody secretions seen during bronch and melena - and has since been resumed. Diet: Diet NPO  Code Status: Limited    Cc. Prognosis guarded. Still struggling with secretions. Back to ICU 7/1. I had another conversation with her  - he told me she does not want to die. I explained to him that I am concerned that progressing Parkinson's and dysphagia are the culprits of her worsening clinical status, large volume secretions and PNA not improving. I expressed my concern that should she be reintubated she will unlikely wean off the vent and likely will need tracheostomy and PEG nutrition as discussed prior during her 2 week intubation period. He wanted to discuss further with the rest of the family before any code status changes. Noted limited code now - DNI.        John Starr MD 7/3/2022 11:04 AM

## 2022-07-03 NOTE — PROGRESS NOTES
Patient having chest pain 7/10, Dr Neil Hooker at bedside, stat portable cxr, and ekg ordered and performed, CT scan ordered as well.

## 2022-07-03 NOTE — PROGRESS NOTES
Pulmonary & Critical Care Medicine ICU Progress Note    CC: shortness of breath     Events of Last 24 hours:    does not want reintubation or trach. Required bipap again yesterday afternoon for tachypnea. Was on precedex overnight. Vascular lines: IV:   6/19/2022 PICC    MV: 6/18/2022 -6/27/22  Vent Mode: AC/VC Resp Rate (Set): 0 bmp/Vt (Set, mL): 0 mL/ /FiO2 : 35 %  Recent Labs     07/01/22  1755   PHART 7.392   IWG8ILH 38.3   PO2ART 51.7*       IV:   dexmedetomidine HCl in NaCl 0.1 mcg/kg/hr (07/03/22 0552)    dextrose 75 mL/hr at 07/03/22 0552    sodium chloride      sodium chloride         Vitals:  Blood pressure 106/70, pulse 97, temperature 98.4 °F (36.9 °C), temperature source Axillary, resp. rate 29, height 5' 6\" (1.676 m), weight 259 lb 7.7 oz (117.7 kg), SpO2 100 %, not currently breastfeeding. On  3L  Constitutional:  In no distress. Eyes: PERRL. Conjunctivae anicteric. ENT: Normal nose. Normal tongue. Neck:  Trachea is midline. No thyroid tenderness. Respiratory: + accessory muscle usage. decreased breath sounds. No wheezes. No rales. + Rhonchi. Cardiovascular: Normal S1S2. No digit clubbing. No digit cyanosis. +  LE edema. Gastrointestinal: No mass palpated. No tenderness palpated. No umbilical hernia. Skin: No rash on the exposed extremities. No Nodules or induration on exposed extremities. Psychiatric: No anxiety or Agitation. Alert and Oriented to person, place and time.       Scheduled Meds:   vancomycin  750 mg IntraVENous Once    meropenem  500 mg IntraVENous Q12H    fluconazole  200 mg IntraVENous Q24H    sodium chloride nebulizer  3 mL Nebulization Q8H    vancomycin (VANCOCIN) intermittent dosing (placeholder)   Other RX Placeholder    metoprolol  5 mg IntraVENous Q6H    metoprolol tartrate  25 mg Oral BID    ipratropium-albuterol  1 ampule Inhalation Q4H WA    pantoprazole  40 mg IntraVENous BID    chlorhexidine  15 mL Mouth/Throat BID    sodium chloride flush  10 mL IntraVENous 2 times per day    apixaban  2.5 mg Oral BID    atorvastatin  40 mg Oral Nightly    carbidopa-levodopa  1 tablet Oral 4x Daily    DULoxetine  60 mg Oral Daily    budesonide-formoterol  2 puff Inhalation BID    lamoTRIgine  50 mg Oral BID    montelukast  10 mg Oral Daily    propafenone  150 mg Oral 3 times per day       Data:  CBC:   Recent Labs     07/01/22  1800 07/02/22  0431 07/03/22  0745   WBC 29.2* 37.2* 34.4*   HGB 10.2* 10.1* 10.5*   HCT 31.5* 31.3* 33.0*   MCV 85.2 85.5 85.7    365 328     BMP:   Recent Labs     06/30/22  1032 06/30/22  1032 07/01/22  0709 07/01/22  0709 07/01/22  1800 07/02/22  0431 07/03/22  0625   *   < > 147*   < > 144 144 140   K 3.5   < > 3.5  --  3.4* 3.8  --    *   < > 111*   < > 109 107 106   CO2 22   < > 23   < > 22 24 17*   PHOS 3.5  --  3.0  --  2.5  --   --    BUN 54*   < > 47*   < > 45* 47* 46*   CREATININE 2.5*   < > 2.3*   < > 2.3* 2.4* 2.2*    < > = values in this interval not displayed. LIVER PROFILE:   No results for input(s): AST, ALT, LIPASE, BILIDIR, BILITOT, ALKPHOS in the last 72 hours. Invalid input(s): AMYLASE,  ALB    Microbiology:  6/17/2022 SARS-CoV-2 and influenza are negative  6/17/2022 MRSA DNA screen positive  6/17/2022 blood NG  6/17/2022 sputum NRF  6/18/2022 tracheal aspirate NRF  6/19/2022 BAL Candida  6/19/2022 respiratory viral panel negative  7/1/22 Blood cx NGTD  7/3/22 PCT 0.93    Imaging:  ACT 6/23/22   Small right and very small effusions, diverticulosis, nonspecific rectal wall thickening    7/1/22 CXR  Persistent left lower lobe opacification and effusion.  Interval clearing of   perihilar edema and right basilar opacification and effusion.        ASSESSMENT:  · Fever  · Likely ongoing aspiration of secretions  · Acute hypoxemic respiratory failure  · Aspiration pneumonia  · BETSY on BiPAP 22/15 with 1 L bleed in   · Metabolic acidosis likely due to FARHEEN  · Parkinson's disease  · Atrial fibrillation  · Acute on chronic kidney failure, baseline creatinine 1.6  · Acute on chronic anemia, s/p 2 U PRBC 6/23/2022  · H/O esophagitis  · Pleural effusions are small bilaterally    PLAN:  Bipap for respiratory distress  Supplemental oxygen to maintain SaO2 >92%; wean as tolerated   NT suctioning  · S/p 10 days cefepime & vancomycin  · Vanc and Merrem D#3  · Fluconazole per IM  · D5 for hypernatremia  · F/u cx  · SLP following  · On Eliquis  · DNI  · Due to at least single organ failure and risk of rapid deterioration, I spent 35 minutes of Critical care time reviewing labs/films, examining patient, collaborating with other physicians. This does not include time performing critical procedures.

## 2022-07-03 NOTE — PROGRESS NOTES
This note also relates to the following rows which could not be included:  Heart Rate - Cannot attach notes to unvalidated device data  SpO2 - Cannot attach notes to unvalidated device data       07/03/22 0259   NIV Type   NIV Started/Stopped On   Equipment Type v60   Mode Bilevel   Mask Type Full face mask   Mask Size Medium   Settings/Measurements   IPAP 16 cmH20   CPAP/EPAP 8 cmH2O   Rate Ordered 16   Resp (!) 31   FiO2  35 %   I Time/ I Time % 0.9 s   Vt Exhaled 795 mL   Minute Volume 23.6 Liters   Mask Leak (lpm) 27 lpm   Comfort Level Good   Using Accessory Muscles No   SpO2 98

## 2022-07-03 NOTE — PROGRESS NOTES
07/02/22 6358   NIV Type   NIV Started/Stopped On   Equipment Type V60   Mode Bilevel   Mask Type Full face mask   Mask Size Medium   Settings/Measurements   PIP Observed 16 cm H20   IPAP 16 cmH20   CPAP/EPAP 8 cmH2O   Rate Ordered 16   Resp 26   Insp Rise Time (%) 2 %   FiO2  35 %   I Time/ I Time % 0.9 s   Vt Exhaled 349 mL   Minute Volume 12 Liters   Mask Leak (lpm) 0 lpm   Comfort Level Good   Using Accessory Muscles No   SpO2 99   Patient's Home Machine No   Oxygen Therapy/Pulse Ox   O2 Therapy Oxygen   O2 Device PAP (positive airway pressure)   Heart Rate 99   SpO2 100 %

## 2022-07-03 NOTE — PROGRESS NOTES
[facility] Progress Note           Subjective/   80y.o. year old female who we are seeing in consultation for FARHEEN/CKD. HPI:  The patient was seen and examined; she feels well today with no CP, SOB, nausea or vomiting. ROS: No fever or chills. Social: No family at bedside. Objective/   GEN:  Chronically ill, /67   Pulse 84   Temp 98.6 °F (37 °C) (Oral)   Resp 28   Ht 5' 6\" (1.676 m)   Wt 259 lb 7.7 oz (117.7 kg)   SpO2 92%   BMI 41.88 kg/m²      HEENT: non-icteric, no JVD  CV: S1, S2 without m/r/g; ++ LE edema  RESP: Decreased breath sounds  ABD: +bs, soft, nt, no hsm  SKIN: warm, no rashes    Data/  Recent Labs     07/01/22  1800 07/02/22  0431 07/03/22  0745   WBC 29.2* 37.2* 34.4*   HGB 10.2* 10.1* 10.5*   HCT 31.5* 31.3* 33.0*   MCV 85.2 85.5 85.7    365 328     Recent Labs     07/01/22  0709 07/01/22  0709 07/01/22  1800 07/02/22  0431 07/03/22  0625 07/03/22  0745   *   < > 144 144 140  --    K 3.5   < > 3.4* 3.8  --  3.4*   *   < > 109 107 106  --    CO2 23   < > 22 24 17*  --    GLUCOSE 157*   < > 197* 149* 148*  --    PHOS 3.0  --  2.5  --   --   --    MG 1.80  --   --  1.50*  --  2.00   BUN 47*   < > 45* 47* 46*  --    CREATININE 2.3*   < > 2.3* 2.4* 2.2*  --    LABGLOM 20*   < > 20* 19* 21*  --    GFRAA 25*   < > 25* 24* 26*  --     < > = values in this interval not displayed.      Component      Latest Ref Rng & Units 6/22/2022 6/22/2022           4:10 PM  4:10 PM   Color, UA      Straw/Yellow Yellow    Clarity, UA      Clear Clear    Glucose, UA      Negative mg/dL Negative    Bilirubin, Urine      Negative Negative    Ketones, Urine      Negative mg/dL TRACE (A)    Specific Gravity, UA      1.005 - 1.030 1.025    Blood, Urine      Negative Negative    pH, UA      5.0 - 8.0 5.5    Protein, UA      Negative mg/dL TRACE (A)    Urobilinogen, Urine      <2.0 E.U./dL 0.2    Nitrite, Urine      Negative Negative    Leukocyte Esterase, Urine Negative Negative    Microscopic Examination       YES    Urine Type       NotGiven    Mucus, UA      None Seen /LPF Rare (A)    WBC, UA      0 - 5 /HPF 10-20 (A)    RBC, UA      0 - 4 /HPF 3-4    Epithelial Cells, UA      0 - 5 /HPF 0-1    Bacteria, UA      None Seen /HPF 2+ (A)    Sodium, Ur      Not Established mmol/L  <20   Potassium, Ur      Not Established mmol/L  26.3   Chloride      Not Established mmol/L  <20       Assessment/     - Acute kidney injury - multi-factorial ATN injury in setting of aspiration PNA, hypotension, & elevated vancomycin levels     - Chronic kidney disease stage 3b - background DM/HTN/CHF              SCr 1.6              Follows with [facility]     - Acute hypoxemic respiratory failure/Aspiration PNA/Pulmonary edema - on ventilator     - Diastolic CHF     - Atrial fibrillation    - Anemia - prn prbc's     - Hypertension    - Hypokalemia - prn replacement    -Metabolic acidosis    -Nausea vomiting on 7/1 defer to internal medicine  ? Ileus    Plan/   -Decrease D5W to 50 ml/h   -PRN potassium replacement  -Serial renal panel    ____________________________________  Joselo Hoang MD  [facility]  [email or web address]  Office: [phone]

## 2022-07-04 LAB
ANION GAP SERPL CALCULATED.3IONS-SCNC: 11 MMOL/L (ref 3–16)
BASE EXCESS VENOUS: -0.5 MMOL/L (ref -3–3)
BUN BLDV-MCNC: 43 MG/DL (ref 7–20)
CALCIUM SERPL-MCNC: 8.8 MG/DL (ref 8.3–10.6)
CARBOXYHEMOGLOBIN: 1.7 % (ref 0–1.5)
CHLORIDE BLD-SCNC: 107 MMOL/L (ref 99–110)
CO2: 24 MMOL/L (ref 21–32)
CREAT SERPL-MCNC: 2.1 MG/DL (ref 0.6–1.2)
EKG ATRIAL RATE: 89 BPM
EKG DIAGNOSIS: NORMAL
EKG Q-T INTERVAL: 328 MS
EKG QRS DURATION: 100 MS
EKG QTC CALCULATION (BAZETT): 425 MS
EKG R AXIS: 131 DEGREES
EKG T AXIS: 121 DEGREES
EKG VENTRICULAR RATE: 101 BPM
GFR AFRICAN AMERICAN: 27
GFR NON-AFRICAN AMERICAN: 23
GLUCOSE BLD-MCNC: 106 MG/DL (ref 70–99)
GLUCOSE BLD-MCNC: 113 MG/DL (ref 70–99)
GLUCOSE BLD-MCNC: 118 MG/DL (ref 70–99)
GLUCOSE BLD-MCNC: 119 MG/DL (ref 70–99)
GLUCOSE BLD-MCNC: 132 MG/DL (ref 70–99)
HCO3 VENOUS: 23.5 MMOL/L (ref 23–29)
HCT VFR BLD CALC: 29.3 % (ref 36–48)
HEMOGLOBIN: 9.3 G/DL (ref 12–16)
MCH RBC QN AUTO: 27.3 PG (ref 26–34)
MCHC RBC AUTO-ENTMCNC: 31.9 G/DL (ref 31–36)
MCV RBC AUTO: 85.7 FL (ref 80–100)
METHEMOGLOBIN VENOUS: 0.3 %
O2 SAT, VEN: 96 %
O2 THERAPY: ABNORMAL
PCO2, VEN: 36 MMHG (ref 40–50)
PDW BLD-RTO: 14.5 % (ref 12.4–15.4)
PERFORMED ON: ABNORMAL
PH VENOUS: 7.43 (ref 7.35–7.45)
PLATELET # BLD: 273 K/UL (ref 135–450)
PMV BLD AUTO: 8.6 FL (ref 5–10.5)
PO2, VEN: 79.7 MMHG (ref 25–40)
POTASSIUM SERPL-SCNC: 3.6 MMOL/L (ref 3.5–5.1)
RBC # BLD: 3.42 M/UL (ref 4–5.2)
SODIUM BLD-SCNC: 142 MMOL/L (ref 136–145)
TCO2 CALC VENOUS: 25 MMOL/L
VANCOMYCIN RANDOM: 16.8 UG/ML
WBC # BLD: 28.5 K/UL (ref 4–11)

## 2022-07-04 PROCEDURE — 36415 COLL VENOUS BLD VENIPUNCTURE: CPT

## 2022-07-04 PROCEDURE — 2580000003 HC RX 258: Performed by: INTERNAL MEDICINE

## 2022-07-04 PROCEDURE — 6370000000 HC RX 637 (ALT 250 FOR IP): Performed by: INTERNAL MEDICINE

## 2022-07-04 PROCEDURE — 80202 ASSAY OF VANCOMYCIN: CPT

## 2022-07-04 PROCEDURE — 2700000000 HC OXYGEN THERAPY PER DAY

## 2022-07-04 PROCEDURE — 6360000002 HC RX W HCPCS: Performed by: INTERNAL MEDICINE

## 2022-07-04 PROCEDURE — 2000000000 HC ICU R&B

## 2022-07-04 PROCEDURE — 94003 VENT MGMT INPAT SUBQ DAY: CPT

## 2022-07-04 PROCEDURE — C9113 INJ PANTOPRAZOLE SODIUM, VIA: HCPCS | Performed by: INTERNAL MEDICINE

## 2022-07-04 PROCEDURE — 2500000003 HC RX 250 WO HCPCS: Performed by: INTERNAL MEDICINE

## 2022-07-04 PROCEDURE — 85027 COMPLETE CBC AUTOMATED: CPT

## 2022-07-04 PROCEDURE — 94640 AIRWAY INHALATION TREATMENT: CPT

## 2022-07-04 PROCEDURE — 93010 ELECTROCARDIOGRAM REPORT: CPT | Performed by: INTERNAL MEDICINE

## 2022-07-04 PROCEDURE — 99233 SBSQ HOSP IP/OBS HIGH 50: CPT | Performed by: INTERNAL MEDICINE

## 2022-07-04 PROCEDURE — 80048 BASIC METABOLIC PNL TOTAL CA: CPT

## 2022-07-04 PROCEDURE — 82803 BLOOD GASES ANY COMBINATION: CPT

## 2022-07-04 PROCEDURE — 92526 ORAL FUNCTION THERAPY: CPT

## 2022-07-04 PROCEDURE — 99291 CRITICAL CARE FIRST HOUR: CPT | Performed by: INTERNAL MEDICINE

## 2022-07-04 PROCEDURE — 94761 N-INVAS EAR/PLS OXIMETRY MLT: CPT

## 2022-07-04 RX ORDER — IPRATROPIUM BROMIDE AND ALBUTEROL SULFATE 2.5; .5 MG/3ML; MG/3ML
1 SOLUTION RESPIRATORY (INHALATION)
Status: DISCONTINUED | OUTPATIENT
Start: 2022-07-04 | End: 2022-07-19 | Stop reason: HOSPADM

## 2022-07-04 RX ADMIN — Medication 15 ML: at 07:39

## 2022-07-04 RX ADMIN — VANCOMYCIN HYDROCHLORIDE 500 MG: 500 INJECTION, POWDER, LYOPHILIZED, FOR SOLUTION INTRAVENOUS at 07:39

## 2022-07-04 RX ADMIN — METOPROLOL TARTRATE 5 MG: 1 INJECTION, SOLUTION INTRAVENOUS at 15:34

## 2022-07-04 RX ADMIN — IPRATROPIUM BROMIDE AND ALBUTEROL SULFATE 1 AMPULE: 2.5; .5 SOLUTION RESPIRATORY (INHALATION) at 07:55

## 2022-07-04 RX ADMIN — MEROPENEM 1000 MG: 1 INJECTION, POWDER, FOR SOLUTION INTRAVENOUS at 18:10

## 2022-07-04 RX ADMIN — MEROPENEM 1000 MG: 1 INJECTION, POWDER, FOR SOLUTION INTRAVENOUS at 06:05

## 2022-07-04 RX ADMIN — IPRATROPIUM BROMIDE AND ALBUTEROL SULFATE 1 AMPULE: 2.5; .5 SOLUTION RESPIRATORY (INHALATION) at 23:22

## 2022-07-04 RX ADMIN — PANTOPRAZOLE SODIUM 40 MG: 40 INJECTION, POWDER, LYOPHILIZED, FOR SOLUTION INTRAVENOUS at 07:41

## 2022-07-04 RX ADMIN — LAMOTRIGINE 50 MG: 25 TABLET ORAL at 08:58

## 2022-07-04 RX ADMIN — LAMOTRIGINE 50 MG: 25 TABLET ORAL at 20:26

## 2022-07-04 RX ADMIN — IPRATROPIUM BROMIDE AND ALBUTEROL SULFATE 1 AMPULE: 2.5; .5 SOLUTION RESPIRATORY (INHALATION) at 20:05

## 2022-07-04 RX ADMIN — SODIUM CHLORIDE, PRESERVATIVE FREE 10 ML: 5 INJECTION INTRAVENOUS at 20:25

## 2022-07-04 RX ADMIN — APIXABAN 2.5 MG: 5 TABLET, FILM COATED ORAL at 20:26

## 2022-07-04 RX ADMIN — DULOXETINE HYDROCHLORIDE 60 MG: 60 CAPSULE, DELAYED RELEASE ORAL at 07:46

## 2022-07-04 RX ADMIN — PANTOPRAZOLE SODIUM 40 MG: 40 INJECTION, POWDER, LYOPHILIZED, FOR SOLUTION INTRAVENOUS at 20:27

## 2022-07-04 RX ADMIN — SODIUM CHLORIDE, PRESERVATIVE FREE 10 ML: 5 INJECTION INTRAVENOUS at 07:46

## 2022-07-04 RX ADMIN — METOPROLOL TARTRATE 25 MG: 25 TABLET, FILM COATED ORAL at 20:27

## 2022-07-04 RX ADMIN — IPRATROPIUM BROMIDE AND ALBUTEROL SULFATE 1 AMPULE: 2.5; .5 SOLUTION RESPIRATORY (INHALATION) at 15:35

## 2022-07-04 RX ADMIN — APIXABAN 2.5 MG: 5 TABLET, FILM COATED ORAL at 07:45

## 2022-07-04 RX ADMIN — ATORVASTATIN CALCIUM 40 MG: 40 TABLET, FILM COATED ORAL at 20:26

## 2022-07-04 RX ADMIN — METOPROLOL TARTRATE 5 MG: 1 INJECTION, SOLUTION INTRAVENOUS at 08:57

## 2022-07-04 RX ADMIN — CARBIDOPA AND LEVODOPA 1 TABLET: 25; 100 TABLET ORAL at 16:44

## 2022-07-04 RX ADMIN — CARBIDOPA AND LEVODOPA 1 TABLET: 25; 100 TABLET ORAL at 12:31

## 2022-07-04 RX ADMIN — CARBIDOPA AND LEVODOPA 1 TABLET: 25; 100 TABLET ORAL at 20:27

## 2022-07-04 RX ADMIN — CARBIDOPA AND LEVODOPA 1 TABLET: 25; 100 TABLET ORAL at 07:45

## 2022-07-04 RX ADMIN — IPRATROPIUM BROMIDE AND ALBUTEROL SULFATE 1 AMPULE: 2.5; .5 SOLUTION RESPIRATORY (INHALATION) at 11:41

## 2022-07-04 RX ADMIN — MONTELUKAST SODIUM 10 MG: 10 TABLET, COATED ORAL at 07:45

## 2022-07-04 RX ADMIN — DEXTROSE MONOHYDRATE: 50 INJECTION, SOLUTION INTRAVENOUS at 19:20

## 2022-07-04 RX ADMIN — PROPAFENONE HYDROCHLORIDE 150 MG: 150 TABLET, FILM COATED ORAL at 14:13

## 2022-07-04 RX ADMIN — PROPAFENONE HYDROCHLORIDE 150 MG: 150 TABLET, FILM COATED ORAL at 22:39

## 2022-07-04 RX ADMIN — PROPAFENONE HYDROCHLORIDE 150 MG: 150 TABLET, FILM COATED ORAL at 06:06

## 2022-07-04 RX ADMIN — METOPROLOL TARTRATE 25 MG: 25 TABLET, FILM COATED ORAL at 07:45

## 2022-07-04 RX ADMIN — Medication 15 ML: at 20:26

## 2022-07-04 RX ADMIN — FLUCONAZOLE 200 MG: 2 INJECTION, SOLUTION INTRAVENOUS at 09:02

## 2022-07-04 ASSESSMENT — PAIN SCALES - GENERAL
PAINLEVEL_OUTOF10: 0

## 2022-07-04 NOTE — PROGRESS NOTES
7/4  Vanc random = 16.8 mcg/mL at 0424. Give vancomycin 500 mg x1. Recheck vanc random tomorrow in the AM (7/5 at 0600).   Blanca Roberts, PharmD  7/4/2022 5:49 AM

## 2022-07-04 NOTE — PROGRESS NOTES
[facility] Progress Note           Subjective/   80y.o. year old female who we are seeing in consultation for FARHEEN/CKD. HPI:  The patient was seen and examined; she feels well today with no CP, SOB, nausea or vomiting. ROS: No fever or chills. Social: Family at bedside. Objective/   GEN:  Chronically ill, /64   Pulse 87   Temp 99.8 °F (37.7 °C) (Bladder)   Resp (!) 32   Ht 5' 6\" (1.676 m)   Wt 259 lb 7.7 oz (117.7 kg)   SpO2 93%   BMI 41.88 kg/m²      HEENT: non-icteric, no JVD  CV: S1, S2 without m/r/g; ++ LE edema  RESP: Decreased breath sounds  ABD: +bs, soft, nt, no hsm  SKIN: warm, no rashes    Data/  Recent Labs     07/02/22  0431 07/03/22  0745 07/04/22  0424   WBC 37.2* 34.4* 28.5*   HGB 10.1* 10.5* 9.3*   HCT 31.3* 33.0* 29.3*   MCV 85.5 85.7 85.7    328 273     Recent Labs     07/01/22  1800 07/01/22  1800 07/02/22  0431 07/03/22  0625 07/03/22  0745 07/04/22  0424      < > 144 140  --  142   K 3.4*   < > 3.8  --  3.4* 3.6      < > 107 106  --  107   CO2 22   < > 24 17*  --  24   GLUCOSE 197*   < > 149* 148*  --  118*   PHOS 2.5  --   --   --   --   --    MG  --   --  1.50*  --  2.00  --    BUN 45*   < > 47* 46*  --  43*   CREATININE 2.3*   < > 2.4* 2.2*  --  2.1*   LABGLOM 20*   < > 19* 21*  --  23*   GFRAA 25*   < > 24* 26*  --  27*    < > = values in this interval not displayed.      Component      Latest Ref Rng & Units 6/22/2022 6/22/2022           4:10 PM  4:10 PM   Color, UA      Straw/Yellow Yellow    Clarity, UA      Clear Clear    Glucose, UA      Negative mg/dL Negative    Bilirubin, Urine      Negative Negative    Ketones, Urine      Negative mg/dL TRACE (A)    Specific Gravity, UA      1.005 - 1.030 1.025    Blood, Urine      Negative Negative    pH, UA      5.0 - 8.0 5.5    Protein, UA      Negative mg/dL TRACE (A)    Urobilinogen, Urine      <2.0 E.U./dL 0.2    Nitrite, Urine      Negative Negative    Leukocyte Esterase, Urine      Negative Negative    Microscopic Examination       YES    Urine Type       NotGiven    Mucus, UA      None Seen /LPF Rare (A)    WBC, UA      0 - 5 /HPF 10-20 (A)    RBC, UA      0 - 4 /HPF 3-4    Epithelial Cells, UA      0 - 5 /HPF 0-1    Bacteria, UA      None Seen /HPF 2+ (A)    Sodium, Ur      Not Established mmol/L  <20   Potassium, Ur      Not Established mmol/L  26.3   Chloride      Not Established mmol/L  <20       Assessment/     - Acute kidney injury - multi-factorial ATN injury in setting of aspiration PNA, hypotension, & elevated vancomycin levels     - Chronic kidney disease stage 3b - background DM/HTN/CHF              SCr 1.6              Follows with [facility]     - Acute hypoxemic respiratory failure/Aspiration PNA/Pulmonary edema - on ventilator     - Diastolic CHF     - Atrial fibrillation    - Anemia - prn prbc's     - Hypertension    - Hypokalemia - prn replacement    -Metabolic acidosis    -Nausea vomiting on 7/1 defer to internal medicine  ? Ileus    Plan/   -Continue D5W at 50 ml/h   -Serial renal panel    ____________________________________  Richa Torres MD  [facility]  [email or web address]  Office: [phone]

## 2022-07-04 NOTE — PROGRESS NOTES
Assessment complete, patient awake and alert, on o2 at 10 lpm, changed delivery device to HFNC with humidity, VSS, sao2 at 94%, will wean as tolerated.

## 2022-07-04 NOTE — PROGRESS NOTES
Comprehensive Nutrition Assessment    Type and Reason for Visit:  Reassess    Nutrition Recommendations/Plan:   1. Continue NPO status until patient is medically cleared to receive nutrition therapy. 2. Monitor for diet advancement and appetite + po intake when diet is advanced. 3. Please obtain an updated weight for this patient - last weight was obtained on 6/29/22.   4. Monitor nutrition-related labs, bowel function, and weight trends.       Malnutrition Assessment:  Malnutrition Status:  Mild malnutrition (07/04/22 1013)    Context:  Acute Illness     Findings of the 6 clinical characteristics of malnutrition:  Energy Intake:  50% or less of estimated energy requirements for 5 or more days  Weight Loss:   (- 11# or 3.9% weight loss since 6/18/22)     Body Fat Loss:  No significant body fat loss     Muscle Mass Loss:  No significant muscle mass loss    Fluid Accumulation:  No significant fluid accumulation     Strength:  Not Performed    Nutrition Assessment:    patient has declined from a nutritional standpoint AEB NPO status and patient had emesis + spiked high fever on 7/1/22 and was transferred back to ICU overnight on 7/1/22; she remains at risk for further compromise d/t NPO status, altered nutrition-related labs, and stage 2 wound on sacrum; will continue NPO status and monitor nutrition plan of care    Nutrition Related Findings:    patient is A & O x 4; she was transferred back to ICU overnight on 7/1/22; she had emesis and spiked a fever on 7/1/22; last documented BM was on 7/3/22; abdomen is soft, distended, and bowel sounds are active; she has D5 infusing at 50 ml/hr currently Wound Type: Stage II,Pressure Injury (stage 2 pressure injury on R buttocks)       Current Nutrition Intake & Therapies:    Average Meal Intake: NPO  Average Supplements Intake: NPO  Diet NPO    Anthropometric Measures:  Height: 5' 6\" (167.6 cm)  Ideal Body Weight (IBW): 130 lbs (59 kg)    Admission Body Weight: 270 lb (122.5 kg) (obtained on 6/18/22; actual weight)  Current Body Weight: 259 lb 7.7 oz (117.7 kg) (obtained on 6/29/22; actual weight), 199.6 % IBW. Weight Source: Bed Scale  Current BMI (kg/m2): 41.9  Usual Body Weight: 270 lb (122.5 kg) (obtained on 6/18/22; actual weight)  % Weight Change (Calculated): -3.9  Weight Adjustment For: No Adjustment                 BMI Categories: Obese Class 3 (BMI 40.0 or greater) (41.88)    Estimated Daily Nutrient Needs:  Energy Requirements Based On: Kcal/kg  Weight Used for Energy Requirements: Current  Energy (kcal/day): 944 - 1298 kcals based on 8-11 kcals/kg/CBW  Weight Used for Protein Requirements: Ideal  Protein (g/day): 148 - 159 g protein based on 2.5-2.7 g/kg/IBW  Method Used for Fluid Requirements: 1 ml/kcal  Fluid (ml/day): 944 - 1298 ml    Nutrition Diagnosis:   · Inadequate oral intake related to inadequate protein-energy intake,impaired respiratory function as evidenced by NPO or clear liquid status due to medical condition,intubation,nutrition support - enteral nutrition,lab values,swallow study results      Nutrition Interventions:   Food and/or Nutrient Delivery: Continue NPO  Nutrition Education/Counseling: No recommendation at this time  Coordination of Nutrition Care: Continue to monitor while inpatient,Interdisciplinary Rounds  Plan of Care discussed with: ICU Team    Goals:  Previous Goal Met: No Progress toward Goal(s)  Goals: PO intake 50% or greater,by next RD assessment       Nutrition Monitoring and Evaluation:   Behavioral-Environmental Outcomes: None Identified  Food/Nutrient Intake Outcomes: Diet Advancement/Tolerance,IVF Intake  Physical Signs/Symptoms Outcomes: Biochemical Data,Chewing or Swallowing,GI Status,Hemodynamic Status,Nutrition Focused Physical Findings,Skin,Weight    Discharge Planning:     Too soon to determine     Hans Dubin, [address],   Contact: [phone]

## 2022-07-04 NOTE — PROGRESS NOTES
Patient alert oriented, VSS wean oxygen to 6 lpm HFNC, patient tolerating well with no problem, family at bedside, tray ordered for patient per speech pathology recommendations, no other needs per patient at present.

## 2022-07-04 NOTE — PROGRESS NOTES
Speech Language Pathology  Facility/Department: [facility] ICU  Dysphagia Daily Treatment Note      Recommendations:  Solid Consistency: IDDSI 4 Puree Solids  Liquid Consistency: IDDSI 2 Mildly Thick (nectar) Liquids / no straws  Medication: Meds crushed in puree as able   If pt has overt s/s of aspiration with PO intake or worsening respiratory status, recommend downgrade to strict NPO pending re-assessment by ST      NAME: Monica Cowart  : 1940  MRN: [id number]    Patient Diagnosis(es):   Patient Active Problem List    Diagnosis Date Noted    Chronic anemia     Mild protein-calorie malnutrition ([address] 2022    Shortness of breath 2022    Aspiration pneumonia of left lower lobe (HCC)     Sensorineural hearing loss, bilateral 2022    Laceration of left lower extremity     Pulmonary nodules     C. difficile colitis 10/07/2021    Chest pain 2021    Pulmonary HTN ([address]     Mitral valve insufficiency     Sialadenitis     Colonic pseudoobstruction     Chronic kidney disease 2020    Partial symptomatic epilepsy with complex partial seizures, not intractable, without status epilepticus ([address]     Diastolic congestive heart failure ([address] 2019    Acute respiratory failure with hypoxia (HCC)     COPD (chronic obstructive pulmonary disease) ([address] 12/10/2018    PAF (paroxysmal atrial fibrillation) ([address]     Septicemia ([address] 2018    Severe obstructive sleep apnea     Hepatic steatosis     Diverticulosis     Blind left eye     Parkinson disease ([address] 2018    CAD (coronary artery disease)     Morbid obesity with BMI of 40.0-44.9, adult ([address] 02/10/2017    Type 2 diabetes mellitus with stage 4 chronic kidney disease, without long-term current use of insulin ([address] 02/10/2017    Moderate persistent asthma 2016    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-sided sciatica 2016    Hypothyroidism 2015    Postural dizziness 02/20/2013    Anemia of chronic disease     GERD (gastroesophageal reflux disease)     Essential hypertension     Hyperlipidemia     Restless leg syndrome     L carotid artery stenosis s/p CEA 01/01/2002     Allergies: Allergies   Allergen Reactions    Levaquin [Levofloxacin]      Pt was given in er and developed hives and redness    Metoclopramide     Phenazopyridine     Pyridium [Phenazopyridine Hcl]     Reglan [Metoclopramide Hcl]     Reglan [Metoclopramide Hcl]      tremors       Subjective: Pt seen upright in bed, alert and agreeable to therapy, RN OK'sulaiman SLP entry and therapy, pt's  at bedside. Pain: no c/o pain    Current Diet: Diet NPO    Diet Tolerance: Pt currently NPO    Dysphagia Treatment and Impressions:   Pt seen in room at bedside with RN permission, Fito SLP f/u and PO trials.  Chart Review/Interview: Pt s/p rapid response 7/1 with transfer to ICU. Pt currently on BiPAP. Dysphagia f/u attempted 7/2, held d/t respiratory status. Pt was downgraded to strict NPO per MD at that time.  FEES completed 6/30 with recommendations for a pureed diet with nectar/mildly thick liquids and crushable meds crushed in puree   Respiratory Status: Pt with SPO2% of 94-96% on 6 LPM HFNC with RR of 26-30/min. Weak (perceptually), congested, and non productive cough at baseline. Jovita at bedside.  Liquid PO Trials:    IDDSI 0 Thin:  Assessed via ice chips: no anterior bolus loss , suspect reduced/impaired A-P bolus transit, suspect premature bolus loss into pharynx, suspect delayed swallow onset, x1 immediate cough after the swallow (1/4 trials; Yankauer used to suction trace amount of clear secretions) and vitals stable   IDDSI 2 Mildly Thick (nectar):  Assessed via tsp (5cc) and cup: no anterior bolus loss , suspect reduced/impaired A-P bolus transit, suspect premature bolus loss into pharynx, suspect delayed swallow onset, no clinical s/s of aspiration and vitals stable. Occasional audible swallow noted, as well as,  double swallow.  Solid PO Trials   IDDSI 4 Puree:   no anterior bolus loss , suspect reduced/impaired A-P bolus transit, suspect premature bolus loss into pharynx, suspect delayed swallow onset, oral clearance grossly WFL and no clinical s/s of aspiration. Occasional audible swallow noted, as well as,  double swallow.  Education: SLP edu pt re: Role of SLP, Rationale for dysphagia tx, Recommended compensatory strategies, Aspiration precautions, POC and FEES results. Pt verbalized understanding, would benefit from ongoing education and RN aware of recommendations   Assessment: Pt tolerating recommended diet with no clinical s/s of aspiration. Some carryover of recommended compensatory strategies   Recommendations: SLP recommends to advance to IDDSI 4 Puree Solids; IDDSI 2 Mildly Thick (nectar) Liquids / no straws; Meds crushed in puree as able   Risk Management: upright for all intake, stay upright for at least 30 mins after intake, small bites/sips, assist feed, no straws, alternate bites/sips, slow rate of intake, general aspiration precautions and hold PO and contact SLP if s/s of aspiration or worsening respiratory status develop. . If the patient exhibits s/s of aspiration and/or worsening respiratory status, hold PO and contact SLP. Dysphagia Goals:  Timeframe for Long-term Goals: 7 days (6/25/22)  Goal 1: Pt will tolerate recommended diet w/o exhibiting overt s/s dysphagia. 07/04: ongoing, initiate/rec PO diet this date     Short-term Goals  Timeframe for Short-term Goals: 5 days (6/23/22)  1) The patient will tolerate repeat bedside swallowing evaluation when able. GOAL MET; FEES completed 6/30  2) The patient will tolerate instrumental swallowing procedure. FEES completed 6/30  3) The patient will tolerate recommended diet without observed clinical signs of aspiration. 07/04: ongoing, progressing.   See above    Speech/Language/Cog Goals: N/A      Recommendations:  Solid Consistency: IDDSI 4 Puree Solids  Liquid Consistency: IDDSI 2 Mildly Thick (nectar) Liquids / no straws  Medication: Meds crushed in puree as able   If pt has overt s/s of aspiration with PO intake or worsening respiratory status, recommend downgrade to strict NPO pending re-assessment by ST    Patient/Family/Caregiver Education: See above    Compensatory Strategies: See above    Plan:    Continued Dysphagia treatment with goals per plan of care. Discharge Recommendations: Continued ST recommended after d/c     If pt discharges from hospital prior to Speech/Swallowing discharge, this note serves as tx and discharge summary.      Total Treatment Time / Charges     Time in Time out Total Time / units   Cognitive Tx         Speech Tx      Dysphagia Tx 1050 1130 40 min / 1 unit     Signature:  Jose Canela M.S. [id number] [facility]  Speech-language pathologist  [id number]

## 2022-07-04 NOTE — PROGRESS NOTES
07/04/22 0244   NIV Type   NIV Started/Stopped On   Equipment Type V60   Mode Bilevel   Mask Type Full face mask   Mask Size Medium   Settings/Measurements   PIP Observed 18 cm H20   IPAP 16 cmH20   CPAP/EPAP 8 cmH2O   Rate Ordered 16   Resp 30   Insp Rise Time (%) 2 %   FiO2  35 %   I Time/ I Time % 0.9 s   Vt Exhaled 600 mL   Minute Volume 16 Liters   Comfort Level Good   Using Accessory Muscles No   SpO2 99   Patient's Home Machine No   Oxygen Therapy/Pulse Ox   O2 Therapy Oxygen   O2 Device PAP (positive airway pressure)   Heart Rate 86   SpO2 98 %

## 2022-07-04 NOTE — PROGRESS NOTES
07/03/22 2303   NIV Type   Skin Protection for O2 Device Yes   Location Nose   NIV Started/Stopped On   Equipment Type V60   Mode Bilevel   Mask Type Full face mask   Mask Size Medium   Settings/Measurements   PIP Observed 19 cm H20   IPAP 16 cmH20   CPAP/EPAP 8 cmH2O   Rate Ordered 16   Resp 29   Insp Rise Time (%) 2 %   FiO2  35 %   I Time/ I Time % 0.9 s   Vt Exhaled 533 mL   Minute Volume 16.3 Liters   Mask Leak (lpm) 3 lpm   Comfort Level Good   Using Accessory Muscles No   SpO2 92   Patient's Home Machine No   Oxygen Therapy/Pulse Ox   O2 Therapy Oxygen   O2 Device PAP (positive airway pressure)   Heart Rate 90   SpO2 92 %

## 2022-07-04 NOTE — PROGRESS NOTES
[facility]ulmonary [facility] Care [facility] ICU Progress Note    CC: shortness of breath     Events of Last 24 hours: Required Bipap overnight and yesterday some. O2 improved some, intermittent resp distress requiring bipap  Low grade fever    Vascular lines: IV:   6/19/2022 PICC    MV: 6/18/2022 -6/27/22  Vent Mode: AC/VC Resp Rate (Set): 0 bmp/Vt (Set, mL): 0 mL/ /FiO2 : 35 %  Recent Labs     07/01/22  1755   PHART 7.392   [id number] 38.3   PO2ART 51.7*       IV:   dextrose 50 mL/hr at 07/04/22 0611    sodium chloride      sodium chloride         Vitals:  Blood pressure 127/70, pulse 84, temperature 99.1 °F (37.3 °C), temperature source Bladder, resp. rate 29, height 5' 6\" (1.676 m), weight 259 lb 7.7 oz (117.7 kg), SpO2 97 %, not currently breastfeeding. On  6L  Constitutional:  In no distress. Eyes: PERRL. Conjunctivae anicteric. ENT: Normal nose. Normal tongue. Neck:  Trachea is midline. No thyroid tenderness. Respiratory: + accessory muscle usage. decreased breath sounds. No wheezes. No rales. + Rhonchi. Cardiovascular: Normal S1S2. No digit clubbing. No digit cyanosis. +  LE edema. Gastrointestinal: No mass palpated. No tenderness palpated. No umbilical hernia. Skin: No rash on the exposed extremities. No Nodules or induration on exposed extremities. Psychiatric: No anxiety or Agitation. Alert and Oriented to person, place and time.       Scheduled Meds:   meropenem  1,000 mg IntraVENous Q12H    fluconazole  200 mg IntraVENous Q24H    vancomycin (VANCOCIN) intermittent dosing (placeholder)   Other RX Placeholder    metoprolol  5 mg IntraVENous Q6H    metoprolol tartrate  25 mg Oral BID    ipratropium-albuterol  1 ampule Inhalation Q4H WA    pantoprazole  40 mg IntraVENous BID    chlorhexidine  15 mL Mouth/Throat BID    sodium chloride flush  10 mL IntraVENous 2 times per day    apixaban  2.5 mg Oral BID    atorvastatin  40 mg Oral Nightly    carbidopa-levodopa  1 tablet Oral 4x Daily    DULoxetine  60 mg Oral Daily    lamoTRIgine  50 mg Oral BID    montelukast  10 mg Oral Daily    propafenone  150 mg Oral 3 times per day       Data:  CBC:   Recent Labs     07/02/22  0431 07/03/22  0745 07/04/22  0424   WBC 37.2* 34.4* 28.5*   HGB 10.1* 10.5* 9.3*   HCT 31.3* 33.0* 29.3*   MCV 85.5 85.7 85.7    328 273     BMP:   Recent Labs     07/01/22  1800 07/01/22  1800 07/02/22  0431 07/03/22  0625 07/03/22  0745 07/04/22  0424      < > 144 140  --  142   K 3.4*   < > 3.8  --  3.4* 3.6      < > 107 106  --  107   CO2 22   < > 24 17*  --  24   PHOS 2.5  --   --   --   --   --    BUN 45*   < > 47* 46*  --  43*   CREATININE 2.3*   < > 2.4* 2.2*  --  2.1*    < > = values in this interval not displayed. LIVER PROFILE:   No results for input(s): AST, ALT, LIPASE, BILIDIR, BILITOT, ALKPHOS in the last 72 hours. Invalid input(s): AMYLASE,  ALB    Microbiology:  6/17/2022 SARS-CoV-2 and influenza are negative  6/17/2022 MRSA DNA screen positive  6/17/2022 blood NG  6/17/2022 sputum NRF  6/18/2022 tracheal aspirate NRF  6/19/2022 BAL Candida  6/19/2022 respiratory viral panel negative  7/1/22 Blood cx NGTD  7/3/22 PCT 0.93    Imaging:  ACT 6/23/22   Small right and very small effusions, diverticulosis, nonspecific rectal wall thickening    7/1/22 CXR  Persistent left lower lobe opacification and effusion.  Interval clearing of   perihilar edema and right basilar opacification and effusion.      7/3/22 Chest CT with worsening pneumonia, no PE    ASSESSMENT:  · Aspiration pneumonia -recurrent  · Was extubated and weaned to room air and home CPAP but aspirated again and returned to the ICU  · Acute hypoxemic respiratory failure  · BETSY on BiPAP 22/15 with 1 L bleed in   · Metabolic acidosis likely due to FARHEEN  · Parkinson's disease  · Atrial fibrillation  · Acute on chronic kidney failure, baseline creatinine 1.6  · Acute on chronic anemia, s/p 2 U PRBC 6/23/2022  · H/O esophagitis  · Pleural

## 2022-07-04 NOTE — PROGRESS NOTES
RT Nebulizer Bronchodilator Protocol Note    There is a bronchodilator order in the chart from a provider indicating to follow the RT Bronchodilator Protocol and there is an Initiate RT Bronchodilator Protocol order as well (see protocol at bottom of note). CXR Findings:  XR CHEST PORTABLE    Result Date: 7/3/2022  Airspace disease within the mid to lower lung zones is slightly increased as compared to prior. Possible small left pleural effusion. The findings from the last RT Protocol Assessment were as follows:  Smoking: (P) Chronic pulmonary disease  Respiratory Pattern: (P) Mild dyspnea at rest, irregular pattern, or RR 21-25 bpm  Breath Sounds: (P) Slightly diminished and/or crackles  Cough: (P) Weak, non-productive  Indication for Bronchodilator Therapy: (P) On home bronchodilators  Bronchodilator Assessment Score: (P) 11    Aerosolized bronchodilator medication orders have been revised according to the RT Nebulizer Bronchodilator Protocol below. Respiratory Therapist to perform RT Therapy Protocol Assessment initially then follow the protocol. Repeat RT Therapy Protocol Assessment PRN for score 0-3 or on second treatment, BID, and PRN for scores above 3. No Indications - adjust the frequency to every 6 hours PRN wheezing or bronchospasm, if no treatments needed after 48 hours then discontinue using Per Protocol order mode. If indication present, adjust the RT bronchodilator orders based on the Bronchodilator Assessment Score as indicated below. If a patient is on this medication at home then do not decrease Frequency below that used at home. 0-3 - enter or revise RT bronchodilator order(s) to equivalent RT Bronchodilator order with Frequency of every 4 hours PRN for wheezing or increased work of breathing using Per Protocol order mode.        4-6 - enter or revise RT Bronchodilator order(s) to two equivalent RT bronchodilator orders with one order with BID Frequency and one order with

## 2022-07-04 NOTE — PROGRESS NOTES
Hospitalist PCU Progress Note      PCP: Avril Gonzalez MD    Date of Admission: 6/17/2022    Hospital Course:     admitted for resp failure from CHF , pna, and unable to manage own secretions with advanced parkinson's, eventually intubated    6/28-patient was extubated at 1:57 PM on 6/27/2022. She was then placed on BiPAP. She is getting periodic breaks from BiPAP. This morning she is on BiPAP and able to follow commands. O2 daytime and BiPAP HS. Transferred out of ICU 6/29    Prolonged intubated - there were discussions about trach and PEG support, though able to extubate eventually. Secretions remain a major problem. Resp culture (BAL) growing candida - started antifungal.       Repeat emesis 7/1 and spiking high fever and worsening O2 requirement - she was transferred back to ICU on 7/1 overnight.          Subjective:     She is awake and alert  On 6 L of O2 currently   Used BiPAP all night  On D5W          Medications:  Reviewed    Infusion Medications    dextrose 50 mL/hr at 07/04/22 3700    sodium chloride      sodium chloride       Scheduled Medications    meropenem  1,000 mg IntraVENous Q12H    fluconazole  200 mg IntraVENous Q24H    vancomycin (VANCOCIN) intermittent dosing (placeholder)   Other RX Placeholder    metoprolol  5 mg IntraVENous Q6H    metoprolol tartrate  25 mg Oral BID    ipratropium-albuterol  1 ampule Inhalation Q4H WA    pantoprazole  40 mg IntraVENous BID    chlorhexidine  15 mL Mouth/Throat BID    sodium chloride flush  10 mL IntraVENous 2 times per day    apixaban  2.5 mg Oral BID    atorvastatin  40 mg Oral Nightly    carbidopa-levodopa  1 tablet Oral 4x Daily    DULoxetine  60 mg Oral Daily    lamoTRIgine  50 mg Oral BID    montelukast  10 mg Oral Daily    propafenone  150 mg Oral 3 times per day     PRN Meds: morphine, carboxymethylcellulose PF, sodium chloride, sodium chloride flush, sodium chloride, promethazine **OR** ondansetron, acetaminophen **OR** acetaminophen, albuterol      Intake/Output Summary (Last 24 hours) at 7/4/2022 1104  Last data filed at 7/4/2022 0754  Gross per 24 hour   Intake 1494.06 ml   Output 1125 ml   Net 369.06 ml       Physical Exam Performed:    /70   Pulse 84   Temp 99.1 °F (37.3 °C) (Bladder)   Resp 29   Ht 5' 6\" (1.676 m)   Wt 259 lb 7.7 oz (117.7 kg)   SpO2 97%   BMI 41.88 kg/m²        General Appearance: Elderly white female conversant, weak, not in acute distress. Mucous Membranes:  Pink , anicteric  Neck: No JVD, no carotid bruit, no thyromegaly  Chest: no accessory muscle use. Coarse upper airwai sounds with rhonchi. Cardiovascular:  RRR S1S2 heard, no murmurs or gallops  Abdomen:  Soft, obese,  undistended, non tender, no organomegaly, BS present  Extremities: no edema  Distal pulses well felt  Neurological : Awake and alert. Moves all 4 extremities. Labs:   Recent Labs     07/02/22  0431 07/03/22  0745 07/04/22  0424   WBC 37.2* 34.4* 28.5*   HGB 10.1* 10.5* 9.3*   HCT 31.3* 33.0* 29.3*    328 273     Recent Labs     07/01/22  1800 07/01/22  1800 07/02/22  0431 07/03/22  0625 07/03/22  0745 07/04/22  0424      < > 144 140  --  142   K 3.4*   < > 3.8  --  3.4* 3.6      < > 107 106  --  107   CO2 22   < > 24 17*  --  24   BUN 45*   < > 47* 46*  --  43*   CREATININE 2.3*   < > 2.4* 2.2*  --  2.1*   CALCIUM 9.3   < > 9.4  --  9.4 8.8   PHOS 2.5  --   --   --   --   --     < > = values in this interval not displayed. No results for input(s): AST, ALT, BILIDIR, BILITOT, ALKPHOS in the last 72 hours. No results for input(s): INR in the last 72 hours. No results for input(s): Esther Highman in the last 72 hours.     Urinalysis:      Lab Results   Component Value Date/Time    NITRU Negative 07/01/2022 10:58 PM    [address] 0-2 07/01/2022 10:58 PM    BACTERIA 2+ 06/22/2022 04:10 PM    RBCUA 3-4 07/01/2022 10:58 PM    BLOODU TRACE-LYSED 07/01/2022 10:58 PM    SPECGRAV 1.010 07/01/2022 10:58 PM    GLUCOSEU Negative 07/01/2022 10:58 PM       Radiology:  CT CHEST WO CONTRAST   Final Result   1. Multifocal bronchopneumonia, with progression since 06/17/2022.   2. Small bilateral pleural effusions. 3. Narrowing of the AP diameter of the trachea. 4. Mild cardiomegaly. XR CHEST PORTABLE   Final Result   Airspace disease within the mid to lower lung zones is slightly increased as   compared to prior. Possible small left pleural effusion. CT ABDOMEN PELVIS WO CONTRAST Additional Contrast? None   Final Result   1. Nonspecific, nonobstructive gaseous bowel pattern. 2. Extensive diverticulosis of the left colon, lacking acute inflammatory   changes. 3. Bibasilar consolidations, atelectasis versus pneumonia. Pleural effusions   have resolved since 06/23/2022. XR ABDOMEN (KUB) (SINGLE AP VIEW)   Final Result   1. Gaseous distention of the stomach, otherwise, unremarkable bowel gas   pattern. XR CHEST PORTABLE   Final Result   Stable chest.  Persistent left lower lobe opacification with probable   effusion. XR CHEST PORTABLE   Final Result   Persistent left lower lobe opacification and effusion. Interval clearing of   perihilar edema and right basilar opacification and effusion. XR CHEST PORTABLE   Final Result   No significant interval change in bilateral airspace disease and small   effusions. Unchanged positioning of support devices. XR CHEST PORTABLE   Final Result   Increasing basilar opacities with probable small effusions. Unchanged positioning of support devices. XR CHEST PORTABLE   Final Result   1. No significant change. XR CHEST PORTABLE   Final Result   No significant interval change in basilar predominant airspace disease and   small left greater than right pleural effusions. Pulmonary vascular   congestion.          CT ABDOMEN PELVIS WO CONTRAST Additional Contrast? None   Final Result Colonic diverticulosis without acute diverticulitis. Nonspecific rectal wall   thickening. Partially visualized pleural effusions with adjacent compressive   atelectasis. XR CHEST PORTABLE   Final Result   Again identified is bibasilar airspace disease with small pleural effusions   and mild increased right perihilar infiltrates. This may be related to   atelectasis or pneumonia. Cardiomegaly. XR CHEST PORTABLE   Final Result   Stable examination with bilateral small effusions and bibasilar atelectasis. Stable ET tube and enteric tube. XR CHEST PORTABLE   Final Result   No acute process. Bibasilar hypoaeration      Stable cardiomegaly         IR PICC WO SQ PORT/PUMP > 5 YEARS   Final Result      XR CHEST PORTABLE   Final Result   Supportive tubing projects in stable position. Partial regression of atelectasis in the right lung base. Persisting   consolidation or or atelectasis in the left lung base. XR CHEST PORTABLE   Final Result   1. Endotracheal tube terminates in appropriate position above the nirali. 2.  Enteric tube terminates in the body of the stomach. 3.  Basilar opacities again demonstrated, right greater than left. Interval   improved aeration of the left lung base. XR CHEST PORTABLE   Final Result   Increasing opacity in the right base, lobar atelectasis versus accumulating   right pleural effusion. Combination of both entities or underlying pneumonia   also possible. Persisting left basilar airspace disease and small pleural effusion. XR CHEST PORTABLE   Final Result   Hazy bilateral lobe airspace opacities and small left pleural effusion. This   could represent atelectasis or pneumonia in the appropriate clinical setting. CT CHEST WO CONTRAST   Final Result   1. Respiratory motion in the lower lungs. The right base has linear and   dependent atelectasis.   The left does have atelectatic changes but could also   have some patchy opacities from pneumonia or aspiration at the left base. 2.  There is no pleural effusion or pneumothorax. 3.  Narrowing of the AP diameter of the trachea with bulging of the   membranous portion due to the gas distended but nondilated esophagus. XR CHEST PORTABLE   Final Result   1. Bibasilar heterogeneous opacities may represent subsegmental atelectasis   and/or mild pulmonary edema. Underlying infection is difficult to exclude. Short-term follow-up PA and lateral chest radiographs may be helpful for   further evaluation. 2.  Cardiomegaly. Assessment/Plan:    Principal Problem:    Acute respiratory failure with hypoxia (HCC)  Active Problems:    Shortness of breath    Aspiration pneumonia of left lower lobe (HCC)    Mild protein-calorie malnutrition (HCC)    Chronic anemia    Parkinson disease (HCC)    Septicemia (HCC)    Chronic kidney disease  Resolved Problems:    * No resolved hospital problems. *           Acute Hypox Resp Failure resolving   Aspiration PNA ongoing. Continue IV Zosyn and vancomycin. Change Zosyn to cefepime. DC vancomycin 6/22. Finished 10 days of Vanco and cefepime  Intubated 6/18. Extubated on 6/27/2022.  -On BiPAP. Alternate BiPAP with nasal cannula oxygen.    - SLP involved. - resp culture from BAL is growing candida - started diflucan on 7/1/2022   - spiking high fever 7/1 - back on Abx with Vanc and merrem IV. - transferred back to ICU 7/1       Sepsis POA - due to above. Recurrent.         Dysphagia. Abnormal CT  Cough with every meal intake per family report. Underlying Parkinson's. I ordered for MBS and esophagram, though these will unlikely be done till she is better clinically. Failing bedside SLP   - continues to struggle with secretions. - repeat emesis. - back to NPO status.    - CT abd pelvis 7/2 - actually fairly unremarkable.    Consider placing NGT tomorrow for tube feeds if still n.p.o.      Melena  GI bleed   drop in H/H   GI consulted   H&H held stable. Has since resumed eliquis  IV PPI BID        FARHEEN on CKD IIIB  Likely secondary to ATN in the setting of pneumonia, hypotension and elevated vancomycin levels  Had lasix gtt earlier this admission. Does not appear fluid overloaded now and with no PO intake - off diuretics. Will need IVF - reassess closely.        Parkinson's disease  on Sinemet.         Afib Paroxysmal - back in Afib this am - rate controlled though. Rythmol   Resumed Eliquis. This was held after she had bloody secretions during bronch, but has since been resumed.         HTN - BP on the low side. Nifedipine stopped due to this and also leg edema. Consider alternative when restarting.      DVT Prophylaxis: Eliquis  eliquis held for bloody secretions seen during bronch and melena - and has since been resumed. Diet: Diet NPO  Code Status: Limited    Cc. Prognosis guarded. Still struggling with secretions. Noted limited code now - DNI.        Zora De La Cruz MD 7/4/2022 11:04 AM

## 2022-07-05 PROBLEM — R06.02 SHORTNESS OF BREATH: Status: RESOLVED | Noted: 2022-01-01 | Resolved: 2022-01-01

## 2022-07-05 PROBLEM — B37.0 THRUSH: Status: ACTIVE | Noted: 2022-01-01

## 2022-07-05 PROBLEM — R65.20 SEPSIS WITH ACUTE HYPOXIC RESPIRATORY FAILURE WITHOUT SEPTIC SHOCK (HCC): Status: ACTIVE | Noted: 2018-09-08

## 2022-07-05 PROBLEM — J96.01 SEPSIS WITH ACUTE HYPOXIC RESPIRATORY FAILURE WITHOUT SEPTIC SHOCK (HCC): Status: ACTIVE | Noted: 2018-09-08

## 2022-07-05 PROBLEM — Z22.322 MRSA COLONIZATION: Status: ACTIVE | Noted: 2022-07-05

## 2022-07-05 LAB
ANION GAP SERPL CALCULATED.3IONS-SCNC: 15 MMOL/L (ref 3–16)
BLOOD CULTURE, ROUTINE: NORMAL
BUN BLDV-MCNC: 40 MG/DL (ref 7–20)
CALCIUM SERPL-MCNC: 8.7 MG/DL (ref 8.3–10.6)
CHLORIDE BLD-SCNC: 104 MMOL/L (ref 99–110)
CO2: 16 MMOL/L (ref 21–32)
CREAT SERPL-MCNC: 2 MG/DL (ref 0.6–1.2)
CULTURE, BLOOD 2: NORMAL
GFR AFRICAN AMERICAN: 29
GFR NON-AFRICAN AMERICAN: 24
GLUCOSE BLD-MCNC: 100 MG/DL (ref 70–99)
GLUCOSE BLD-MCNC: 101 MG/DL (ref 70–99)
GLUCOSE BLD-MCNC: 106 MG/DL (ref 70–99)
GLUCOSE BLD-MCNC: 108 MG/DL (ref 70–99)
GLUCOSE BLD-MCNC: 111 MG/DL (ref 70–99)
GLUCOSE BLD-MCNC: 120 MG/DL (ref 70–99)
GLUCOSE BLD-MCNC: 122 MG/DL (ref 70–99)
GLUCOSE BLD-MCNC: 130 MG/DL (ref 70–99)
HCT VFR BLD CALC: 29.8 % (ref 36–48)
HEMOGLOBIN: 9.3 G/DL (ref 12–16)
MCH RBC QN AUTO: 28.1 PG (ref 26–34)
MCHC RBC AUTO-ENTMCNC: 31.2 G/DL (ref 31–36)
MCV RBC AUTO: 89.9 FL (ref 80–100)
PDW BLD-RTO: 14.7 % (ref 12.4–15.4)
PERFORMED ON: ABNORMAL
PLATELET # BLD: 246 K/UL (ref 135–450)
PMV BLD AUTO: 8.4 FL (ref 5–10.5)
POTASSIUM SERPL-SCNC: 3.9 MMOL/L (ref 3.5–5.1)
RBC # BLD: 3.32 M/UL (ref 4–5.2)
SODIUM BLD-SCNC: 135 MMOL/L (ref 136–145)
VANCOMYCIN RANDOM: 14.8 UG/ML
WBC # BLD: 19.9 K/UL (ref 4–11)

## 2022-07-05 PROCEDURE — 97110 THERAPEUTIC EXERCISES: CPT

## 2022-07-05 PROCEDURE — 97164 PT RE-EVAL EST PLAN CARE: CPT

## 2022-07-05 PROCEDURE — 80048 BASIC METABOLIC PNL TOTAL CA: CPT

## 2022-07-05 PROCEDURE — 2580000003 HC RX 258: Performed by: INTERNAL MEDICINE

## 2022-07-05 PROCEDURE — 99233 SBSQ HOSP IP/OBS HIGH 50: CPT | Performed by: INTERNAL MEDICINE

## 2022-07-05 PROCEDURE — 97535 SELF CARE MNGMENT TRAINING: CPT

## 2022-07-05 PROCEDURE — 97530 THERAPEUTIC ACTIVITIES: CPT

## 2022-07-05 PROCEDURE — 6370000000 HC RX 637 (ALT 250 FOR IP): Performed by: INTERNAL MEDICINE

## 2022-07-05 PROCEDURE — 94660 CPAP INITIATION&MGMT: CPT

## 2022-07-05 PROCEDURE — C9113 INJ PANTOPRAZOLE SODIUM, VIA: HCPCS | Performed by: INTERNAL MEDICINE

## 2022-07-05 PROCEDURE — 94640 AIRWAY INHALATION TREATMENT: CPT

## 2022-07-05 PROCEDURE — 92526 ORAL FUNCTION THERAPY: CPT

## 2022-07-05 PROCEDURE — 94669 MECHANICAL CHEST WALL OSCILL: CPT

## 2022-07-05 PROCEDURE — 36415 COLL VENOUS BLD VENIPUNCTURE: CPT

## 2022-07-05 PROCEDURE — 94761 N-INVAS EAR/PLS OXIMETRY MLT: CPT

## 2022-07-05 PROCEDURE — 6360000002 HC RX W HCPCS: Performed by: INTERNAL MEDICINE

## 2022-07-05 PROCEDURE — 97168 OT RE-EVAL EST PLAN CARE: CPT

## 2022-07-05 PROCEDURE — 80202 ASSAY OF VANCOMYCIN: CPT

## 2022-07-05 PROCEDURE — 2700000000 HC OXYGEN THERAPY PER DAY

## 2022-07-05 PROCEDURE — 2500000003 HC RX 250 WO HCPCS: Performed by: INTERNAL MEDICINE

## 2022-07-05 PROCEDURE — 97112 NEUROMUSCULAR REEDUCATION: CPT

## 2022-07-05 PROCEDURE — 85027 COMPLETE CBC AUTOMATED: CPT

## 2022-07-05 PROCEDURE — 2000000000 HC ICU R&B

## 2022-07-05 PROCEDURE — 99222 1ST HOSP IP/OBS MODERATE 55: CPT | Performed by: INTERNAL MEDICINE

## 2022-07-05 RX ORDER — METOPROLOL TARTRATE 50 MG/1
50 TABLET, FILM COATED ORAL 2 TIMES DAILY
Status: DISCONTINUED | OUTPATIENT
Start: 2022-07-05 | End: 2022-07-13

## 2022-07-05 RX ADMIN — METOPROLOL TARTRATE 50 MG: 50 TABLET, FILM COATED ORAL at 21:13

## 2022-07-05 RX ADMIN — PANTOPRAZOLE SODIUM 40 MG: 40 INJECTION, POWDER, LYOPHILIZED, FOR SOLUTION INTRAVENOUS at 21:12

## 2022-07-05 RX ADMIN — Medication 15 ML: at 21:13

## 2022-07-05 RX ADMIN — LAMOTRIGINE 50 MG: 25 TABLET ORAL at 09:10

## 2022-07-05 RX ADMIN — APIXABAN 2.5 MG: 5 TABLET, FILM COATED ORAL at 09:09

## 2022-07-05 RX ADMIN — MEROPENEM 1000 MG: 1 INJECTION, POWDER, FOR SOLUTION INTRAVENOUS at 18:15

## 2022-07-05 RX ADMIN — IPRATROPIUM BROMIDE AND ALBUTEROL SULFATE 1 AMPULE: 2.5; .5 SOLUTION RESPIRATORY (INHALATION) at 23:14

## 2022-07-05 RX ADMIN — PROPAFENONE HYDROCHLORIDE 150 MG: 150 TABLET, FILM COATED ORAL at 13:25

## 2022-07-05 RX ADMIN — LAMOTRIGINE 50 MG: 25 TABLET ORAL at 21:12

## 2022-07-05 RX ADMIN — CARBIDOPA AND LEVODOPA 1 TABLET: 25; 100 TABLET ORAL at 18:11

## 2022-07-05 RX ADMIN — ATORVASTATIN CALCIUM 40 MG: 40 TABLET, FILM COATED ORAL at 21:13

## 2022-07-05 RX ADMIN — PROPAFENONE HYDROCHLORIDE 150 MG: 150 TABLET, FILM COATED ORAL at 09:10

## 2022-07-05 RX ADMIN — CARBIDOPA AND LEVODOPA 1 TABLET: 25; 100 TABLET ORAL at 09:09

## 2022-07-05 RX ADMIN — IPRATROPIUM BROMIDE AND ALBUTEROL SULFATE 1 AMPULE: 2.5; .5 SOLUTION RESPIRATORY (INHALATION) at 08:13

## 2022-07-05 RX ADMIN — MEROPENEM 1000 MG: 1 INJECTION, POWDER, FOR SOLUTION INTRAVENOUS at 06:40

## 2022-07-05 RX ADMIN — SODIUM CHLORIDE, PRESERVATIVE FREE 10 ML: 5 INJECTION INTRAVENOUS at 09:09

## 2022-07-05 RX ADMIN — APIXABAN 2.5 MG: 5 TABLET, FILM COATED ORAL at 21:13

## 2022-07-05 RX ADMIN — CARBIDOPA AND LEVODOPA 1 TABLET: 25; 100 TABLET ORAL at 21:13

## 2022-07-05 RX ADMIN — CARBIDOPA AND LEVODOPA 1 TABLET: 25; 100 TABLET ORAL at 13:25

## 2022-07-05 RX ADMIN — SODIUM CHLORIDE, PRESERVATIVE FREE 10 ML: 5 INJECTION INTRAVENOUS at 21:14

## 2022-07-05 RX ADMIN — VANCOMYCIN HYDROCHLORIDE 750 MG: 750 INJECTION, POWDER, LYOPHILIZED, FOR SOLUTION INTRAVENOUS at 12:20

## 2022-07-05 RX ADMIN — IPRATROPIUM BROMIDE AND ALBUTEROL SULFATE 1 AMPULE: 2.5; .5 SOLUTION RESPIRATORY (INHALATION) at 11:51

## 2022-07-05 RX ADMIN — METOPROLOL TARTRATE 5 MG: 1 INJECTION, SOLUTION INTRAVENOUS at 04:29

## 2022-07-05 RX ADMIN — PROPAFENONE HYDROCHLORIDE 150 MG: 150 TABLET, FILM COATED ORAL at 21:20

## 2022-07-05 RX ADMIN — IPRATROPIUM BROMIDE AND ALBUTEROL SULFATE 1 AMPULE: 2.5; .5 SOLUTION RESPIRATORY (INHALATION) at 14:58

## 2022-07-05 RX ADMIN — FLUCONAZOLE 200 MG: 2 INJECTION, SOLUTION INTRAVENOUS at 09:04

## 2022-07-05 RX ADMIN — PANTOPRAZOLE SODIUM 40 MG: 40 INJECTION, POWDER, LYOPHILIZED, FOR SOLUTION INTRAVENOUS at 09:10

## 2022-07-05 RX ADMIN — METOPROLOL TARTRATE 5 MG: 1 INJECTION, SOLUTION INTRAVENOUS at 09:08

## 2022-07-05 RX ADMIN — MONTELUKAST SODIUM 10 MG: 10 TABLET, COATED ORAL at 09:09

## 2022-07-05 RX ADMIN — METOPROLOL TARTRATE 25 MG: 25 TABLET, FILM COATED ORAL at 09:09

## 2022-07-05 RX ADMIN — Medication 15 ML: at 09:05

## 2022-07-05 RX ADMIN — DULOXETINE HYDROCHLORIDE 60 MG: 60 CAPSULE, DELAYED RELEASE ORAL at 09:08

## 2022-07-05 RX ADMIN — IPRATROPIUM BROMIDE AND ALBUTEROL SULFATE 1 AMPULE: 2.5; .5 SOLUTION RESPIRATORY (INHALATION) at 19:47

## 2022-07-05 ASSESSMENT — PAIN SCALES - GENERAL
PAINLEVEL_OUTOF10: 0
PAINLEVEL_OUTOF10: 4
PAINLEVEL_OUTOF10: 0

## 2022-07-05 ASSESSMENT — PAIN DESCRIPTION - LOCATION: LOCATION: CHEST;OTHER (COMMENT)

## 2022-07-05 NOTE — PROGRESS NOTES
Care rounds completed with Dr. June Douglas and multidisciplinary team. Reviewed labs, meds, VS, assessment, & plan of care for today. d/C IV metoprolol. Place NGT if patient appears to have issues with clearing food when swallowing. Informed of increased Anasarca.  See dictated note and new orders for details.      High risk vesicant drug infusing:     Multiple incompatible medications infusing:      CVP Monitoring:      Extremely difficult IV access challenge:  YES    Continued need for central line access:  YES    Addressed with physician:  YES    RIGHT PATIENT, RIGHT TIME, RIGHT LINE

## 2022-07-05 NOTE — PROGRESS NOTES
Inpatient Physical Therapy Re-Evaluation and Treatment    Unit: ICU  Date:  7/5/2022  Patient Name:    Lou Shaikh  Admitting diagnosis:  Shortness of breath [R06.02]  Septicemia ([facility] [A41.9]  Chronic anemia [D64.9]  Acute respiratory failure with hypoxia ([facility] [J96.01]  Aspiration pneumonia of left lower lobe, unspecified aspiration pneumonia type ([facility] [J69.0]  Chronic kidney disease, unspecified CKD stage [N18.9]  Admit Date:  6/17/2022  Precautions/Restrictions/WB Status/ Lines/ Wounds/ Oxygen: Fall risk, Bed/chair alarm, Lines -IV, Supplemental O2 (5L/min of O2), Zimmerman catheter and PICC left, Telemetry, Continuous pulse oximetry and Isolation Precautions: Contact, 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 History of Present Illness: H & P as per Little Carty MD's note dated 6/17/2022  The patient is a 80 y.o. female Hx of Parkinson's disease, Afib on Eliquis, BETSY, CKD, lives at home with , who presents with SOB and cough. Pt reports cough and SOB and throat pain and odynophagia and dysphagia coughing with every meal. Her cough is deep and rhonchorous but she is struggling to bring up any secretions. She is not sure about fever. She was initially admitted to [facility] with concerns for aspiration PNA and new hypoxia.    This am she is in resp distress, very poor and weak cough, O2 sats 70s despite 10-12l/min O2 - transitioned to NRB mask with sats improved to 92% and then transitioned to vapotherm. I ordered to transfer to ICU due to rapid worsening of oxygenation and pt struggling managing own secretion with very weak cough. Ordered 3% saline nebs and repeat CXR and Pulm evaluation. Given rapid progression of hypoxia and clinical status I strongly suspect mucus plugging. She is to stay strict NPO.   6/27: Patient extubated. Repeat emesis 7/1 and spiking high fever and worsening O2 requirement - she was transferred back to ICU on 7/1 overnight. Home Health S4 Level Recommendation:  Level 3 Safety  AM-PAC Mobility Score    AM-PAC Inpatient Mobility without Stair Climbing Raw Score : 7    Preadmission Environment    Pt. [facility] [address] spouse  Home environment:    one story home  Steps to enter first floor: ramp  Steps to second floor: N/A  Bathroom: walk in shower, shower seat , grab bars, raised commode   Equipment owned: RW, wc (manual), BSC and Bi pap, HHN, electric W?C      Preadmission Status:  Pt. Able to drive: No  Pt Fully independent with ADLs: No  assist   Pt. Required assistance from family for: Cleaning, Cooking and [address]  Pt. independent for transfers and gait and walked with Rollator (questionable - based on later comment RE working with home therapist for walking with w/c follow)  History of falls Unknown   Sleeps in a recliner   Was getting home therapy 1x a week. Pain   Yes  Location: lower back  Rating: mild /10  Pain Medicine Status: RN notified    Cognition    A&O x4   Able to follow 2 step commands. Patient had difficulty in speaking with less audible voice. Patient showed improvement in the head positioning and improved her voice towards the end of the session. Subjective  Patient lying supine in bed with spouse present. Pt agreeable to this PT eval & tx. Upper Extremity ROM/Strength  Please see OT evaluation.       Lower Extremity ROM / Strength   AROM WFL: Yes  ROM limitations: N/A    Strength Assessment (measured on a 0-5 scale):  R LE   Quad 2+   Ant Tib  3-   Hamstring 2   Iliopsoas 2  L LE  Quad   2+   Ant Tib  3-   Hamstring 2   Iliopsoas 2    Lower Extremity Sensation    WFL    Lower Extremity Proprioception:   Diminished    Coordination and Tone  Diminished    Balance  Sitting:  Fair -; Min A   Comments: With forearm support on the weaker RUE compared to LUE. Patient tolerated sitting at the EOB for ~10 min and was limited in sitting due to dizziness. Standing: Not tested; Not Tested  Comments: unsafe to attempt    Bed Mobility   Supine to Sit:    Max A  and 2 persons  Sit to Supine:   Max A  and 2 persons  Rolling:   Not Tested  Scooting in sitting: Total A and 2 persons  Scooting in supine: Total A and 2 persons    Transfer Training     Sit to stand:   unsafe to attempt  Stand to sit:   unsafe to attempt  Bed to Chair:   Not Tested with use of N/A    Gait gait deferred due to difficulty with transfers; pt ambulated 0 ft. Stair Training deferred, pt does not have stairs in home environment    Activity Tolerance   Pt completed therapy session with Dizziness noted with sitting at the EOB, resolved once in the bed in supine position  Supine BP: 144/65,  EOB BP: 154/108  EOB BP after 3min re-check: 147/67    SPO2: at rest 93-94%, with activity decreased to 90% on 5L/min of O2    HR: 82- 88 bpm    Positioning Needs   Pt in bed, alarm set, positioned in proper neutral alignment and pressure relief provided. Exercises Initiated  all completed bilaterally unless indicated   AAROM given as follows  Ankle Pumps x 20 reps  Quad sets x 10 reps  Heel slides x 10 reps  LAQ x 10 reps  Lateral weight shifts in sitting using forearm support on the either sides. Other  None.      Patient/Family Education   Pt educated on role of inpatient PT, POC, importance of continued activity, DC recommendations, safety awareness, transfer techniques, pursed lip breathing, energy conservation, pacing activity and calling for assist with mobility. Assessment  Pt seen for Physical Therapy evaluation in acute care setting. Pt demonstrated decreased Activity tolerance, Balance, ROM, Safety and Strength as well as decreased independence with Ambulation, Bed Mobility  and Transfers. Recommending SNF upon discharge as patient functioning well below baseline, demonstrates good rehab potential and unable to return home due to limited or no family support, burden of care beyond caregiver ability, home environment not conducive to patient recovery and limited safety awareness. Goals : To be met in 3 visits:  1). Independent with LE Ex x 10 reps    To be met in 6 visits:  1). Supine to/from sit: Mod A   2). Sit to/from stand: Mod A   3). Bed to chair: Mod A  and 2 persons  4). Patient will be able to sit at the EOB for ~10 min. Rehabilitation Potential: Good  Strengths for achieving goals include:   Pt motivated, Family Support and Pt cooperative   Barriers to achieving goals include:    Weakness    Plan    To be seen 3-5 x / week  while in acute care setting for therapeutic exercises, bed mobility, transfers, progressive gait training, balance training, and family/patient education. Signature: George Moreno MS PT, # [id number]    If patient discharges from this facility prior to next visit, this note will serve as the Discharge Summary.

## 2022-07-05 NOTE — PROGRESS NOTES
Shift handoff report given to Tonio Oconnor RN at bedside. Pt is Awake and alert  IV handoff completed. Call light within reach, bed in lowest position, bed alarm on. End of shift checks completed. Pt has been free of falls for duration of shift. Sean Vizcaino

## 2022-07-05 NOTE — PROGRESS NOTES
7/5  Vanc random = 14.8 mcg/mL at 0439. Give vancomycin 750 mg x1. Recheck vanc random tomorrow in the AM (7/6 at 0600).   Blanca Roberts, PharmD  7/5/2022 6:34 AM

## 2022-07-05 NOTE — PROGRESS NOTES
07/04/22 2324   NIV Type   Equipment Type v60   Mode Bilevel   Mask Type Full face mask   Mask Size Medium   Settings/Measurements   IPAP 16 cmH20   CPAP/EPAP 8 cmH2O   Rate Ordered 16   Resp 29   FiO2  60 %   Vt Exhaled 636 mL   Minute Volume 18.1 Liters   Mask Leak (lpm) 0 lpm   Comfort Level Good   Using Accessory Muscles No   SpO2 93   Alarm Settings   Alarms On Y   Low Pressure 8 cmH2O   High Pressure  35 cmH2O   Delay Alarm 20 sec(s)   RR Low  14   RR High 55 br/min   Oxygen Therapy/Pulse Ox   Heart Rate 72   SpO2 92 %

## 2022-07-05 NOTE — CONSULTS
[facility] Infectious Disease Consult Note      Yamini Hall     : 1940    DATE OF VISIT:  2022  DATE OF ADMISSION:  2022       Subjective:     Yamini Hall is a 80 y.o. female whom I've been asked to see by MAHAD Cohen, for a new episode of sepsis during her admission. (consult was called on , but I was on vacation until this morning). Chief Complaint   Patient presents with    Shortness of Breath      HPI:  Ms. Maria Esther Toney has a pretty extensive past medical history as outlined below, maybe most significant for asthma, Parkinson's disease and dysphagia. She was admitted here about 2.5 weeks ago with increasing shortness of breath, cough, hypoxemia, and was treated primarily for aspiration pneumonia, though also received a bit of Lasix (in case she had some pulmonary fluid overload, as she had some leg edema as well). She pretty quickly deteriorated after her admission, was transferred to the ICU, and was intubated for about 10 days I believe. She had a diagnostic bronchoscopy a couple of days after admission, but had very little in the way of microbiologic data, apart from a nasal swab positive for MRSA. She finished about a week of antibiotics at first, was extubated not long after that, started to work a bit on speech and swallowing therapy, but then acutely deteriorated on Friday with another aspiration event, with coughing, shortness of breath, hypoxemia, fever, leukocytosis, etc. Needed BiPAP at times, but avoided intubation this time around. Had some new cultures sent, new broad empiric antibiotics were started, and some Diflucan was added for thrush (which sounds like it was pretty apparent during her FEES last week as well). Since then she's made a bit of improvement overall, temps are down, WBC count coming down, still very weak, coughing at times, difficulty in expectorating. Not as short of breath as last week.  Seems to have tolerated Abx well (apart from the thrush). No vomiting or diarrhea, no rash or pruritus, no IV pain; had a PICC placed on about hospital day 4, and that's still in place. Ms. Nancy Reddy has a past medical history of Anemia, unspecified, Asthma, Atrial fibrillation ([address] Baker's cyst, Basal cell carcinoma of left cheek, Blind left eye, Carotid artery stenoses, Carotid stenosis, Cervical spinal cord compression (HCC), 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-sided sciatica, CKD (chronic kidney disease) stage 3, GFR 30-59 ml/min ([address] Community acquired pneumonia of left lower lobe of lung, CRI, Detached retina, left, Diverticulosis, DJD (degenerative joint disease), Edema, Fatty liver, GERD (gastroesophageal reflux disease), Hearing loss, Herniated lumbar intervertebral disc, Hx stage 2 sacral decubitus ulcer, Hyperlipidemia, Hypertension, Hypothyroid, Morbid obesity with BMI of 40.0-44.9, adult ([address] BETSY treated with BiPAP, Parkinson's disease ([address] Partial symptomatic epilepsy with complex partial seizures, not intractable, without status epilepticus ([address] Restless leg syndrome, Restless legs syndrome, Rheumatic fever, Sleep apnea, Tremor, essential, Type 2 diabetes mellitus without complication ([address] and Type II or unspecified type diabetes mellitus without mention of complication, not stated as uncontrolled. She has a past surgical history that includes Tonsillectomy; Hysterectomy; Cholecystectomy; Tubal ligation; knee surgery; hernia repair; eye surgery; Cataract removal; vitrectomy; Corneal transplant; Carotid endarterectomy; Appendectomy; cervical fusion (11/2010); shoulder surgery; joint replacement (Bilateral, 2003); Colonoscopy (N/A, 3/8/2020); Upper gastrointestinal endoscopy (03/17/2020); Upper gastrointestinal endoscopy (N/A, 3/17/2020); IR MIDLINE CATH (10/8/2021); and Ovary removal.    Her family history includes Diabetes in her father, mother, and sister; Heart Disease in her father and mother.      Ms. Nancy Reddy reports that she has quit smoking. She has never used smokeless tobacco. She reports that she does not drink alcohol and does not use drugs.     Current Facility-Administered Medications: vancomycin (VANCOCIN) 750 mg in dextrose 5 % 250 mL IVPB, 750 mg, IntraVENous, Once  ipratropium-albuterol (DUONEB) nebulizer solution 1 ampule, 1 ampule, Inhalation, Q4H While awake  morphine (PF) injection 1 mg, 1 mg, IntraVENous, Q4H PRN  meropenem (MERREM) 1,000 mg in sodium chloride 0.9 % 100 mL IVPB (mini-bag), 1,000 mg, IntraVENous, Q12H  fluconazole (DIFLUCAN) in 0.9 % sodium chloride IVPB 200 mg, 200 mg, IntraVENous, Q24H  vancomycin (VANCOCIN) intermittent dosing (placeholder), , Other, RX Placeholder  metoprolol (LOPRESSOR) injection 5 mg, 5 mg, IntraVENous, Q6H  metoprolol tartrate (LOPRESSOR) tablet 25 mg, 25 mg, Oral, BID  pantoprazole (PROTONIX) injection 40 mg, 40 mg, IntraVENous, BID  carboxymethylcellulose PF (THERATEARS) 1 % ophthalmic gel 1 drop, 1 drop, Both Eyes, 4x Daily PRN  0.9 % sodium chloride infusion, 25 mL, IntraVENous, PRN  chlorhexidine (PERIDEX) 0.12 % solution 15 mL, 15 mL, Mouth/Throat, BID  sodium chloride flush 0.9 % injection 10 mL, 10 mL, IntraVENous, 2 times per day  sodium chloride flush 0.9 % injection 10 mL, 10 mL, IntraVENous, PRN  0.9 % sodium chloride infusion, , IntraVENous, PRN  promethazine (PHENERGAN) tablet 12.5 mg, 12.5 mg, Oral, Q6H PRN **OR** ondansetron (ZOFRAN) injection 4 mg, 4 mg, IntraVENous, Q6H PRN  acetaminophen (TYLENOL) tablet 650 mg, 650 mg, Oral, Q6H PRN **OR** acetaminophen (TYLENOL) suppository 650 mg, 650 mg, Rectal, Q6H PRN  apixaban (ELIQUIS) tablet 2.5 mg, 2.5 mg, Oral, BID  atorvastatin (LIPITOR) tablet 40 mg, 40 mg, Oral, Nightly  carbidopa-levodopa (SINEMET)  MG per tablet 1 tablet, 1 tablet, Oral, 4x Daily  DULoxetine (CYMBALTA) extended release capsule 60 mg, 60 mg, Oral, Daily  lamoTRIgine (LAMICTAL) tablet 50 mg, 50 mg, Oral, BID  montelukast (SINGULAIR) tablet ______________________________    Recent Labs     07/05/22  0439 07/04/22  0424 07/03/22  0745   WBC 19.9* 28.5* 34.4*   HGB 9.3* 9.3* 10.5*   HCT 29.8* 29.3* 33.0*   MCV 89.9 85.7 85.7    273 328     Lab Results   Component Value Date    CREATININE 2.0 (H) 07/05/2022     Lab Results   Component Value Date    LABALBU 3.4 07/01/2022     Lab Results   Component Value Date    ALT <5 (L) 06/23/2022    AST 14 (L) 06/23/2022    ALKPHOS 66 06/23/2022    BILITOT <0.2 06/23/2022      Lab Results   Component Value Date    LABA1C 6.4 09/27/2021     Other recent pertinent labs: Admission creat 1.6, up as high as 2.8 last week, slowly down now. Anion gap up and down, between 11 and 17. Lactate not elevated, either on admission or late last week. PCT on admission minimally elevated, a bit higher in recent days (0.35 - 0.93). Pro-BNP up to 13k on Friday. Vanco level 14.8 today. WBC 21k on admit, steadily down to 10k, spiked to 37k a few days ago, down again. July 1 UA only a couple of WBCs.  ______________________________    Recent pertinent micro results:  Admission Flu and COVID negative. Admission LegUrAg negative. Admission and this weekend's BCx negative. Admission MRSA nasal swab (+). June 18 RCx -- NRF. June 19 bronch cultures negative (on Abx), except for Candida albicans.   ______________________________    Recent imaging results (last 7 days):     CT ABDOMEN PELVIS WO CONTRAST Additional Contrast? None    Result Date: 7/2/2022  1. Nonspecific, nonobstructive gaseous bowel pattern. 2. Extensive diverticulosis of the left colon, lacking acute inflammatory changes. 3. Bibasilar consolidations, atelectasis versus pneumonia. Pleural effusions have resolved since 06/23/2022. XR ABDOMEN (KUB) (SINGLE AP VIEW)    Result Date: 7/3/2022  1. Gaseous distention of the stomach, otherwise, unremarkable bowel gas pattern.      CT CHEST WO CONTRAST    Result Date: 7/3/2022  1. Multifocal bronchopneumonia, with progression since 06/17/2022. 2. Small bilateral pleural effusions. 3. Narrowing of the AP diameter of the trachea. 4. Mild cardiomegaly. XR CHEST PORTABLE    Result Date: 7/3/2022  Airspace disease within the mid to lower lung zones is slightly increased as compared to prior. Possible small left pleural effusion. XR CHEST PORTABLE    Result Date: 7/1/2022  Stable chest.  Persistent left lower lobe opacification with probable effusion. XR CHEST PORTABLE    Result Date: 7/1/2022  Persistent left lower lobe opacification and effusion. Interval clearing of perihilar edema and right basilar opacification and effusion.       Assessment:     Patient Active Problem List   Diagnosis Code    GERD (gastroesophageal reflux disease) K21.9    Essential hypertension I10    Hyperlipidemia E78.5    Restless leg syndrome G25.81    Anemia of chronic disease D63.8    Postural dizziness R42    Moderate persistent asthma J45.40    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-sided sciatica M54.41, G89.29    Morbid obesity with BMI of 40.0-44.9, adult ([facility]) E66.01, Z68.41    Type 2 diabetes mellitus with stage 4 chronic kidney disease, without long-term current use of insulin ([facility]) E11.22, N18.4    CAD (coronary artery disease) I25.10    Parkinson disease ([facility] G20    Severe obstructive sleep apnea G47.33    Hypothyroidism E03.9    Hepatic steatosis K76.0    Diverticulosis K57.90    Blind left eye H54.40    L carotid artery stenosis s/p CEA I65.29    Septicemia ([facility]) A41.9    PAF (paroxysmal atrial fibrillation) ([facility]) I48.0    COPD (chronic obstructive pulmonary disease) ([facility]) J44.9    Acute respiratory failure with hypoxia ([facility]) Z28.50    Diastolic congestive heart failure ([facility]) I50.30    Partial symptomatic epilepsy with complex partial seizures, not intractable, without status epilepticus ([facility]) G40.209    Chronic kidney disease N18.9    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 difficile colitis A04.72    Laceration of left lower extremity S81.812A    Pulmonary nodules R91.8    Sensorineural hearing loss, bilateral H90.3    Shortness of breath R06.02    Aspiration pneumonia of left lower lobe (HCC) J69.0    Mild protein-calorie malnutrition (HCC) E44.1    Chronic anemia D64.9     Assessment of today's active condition(s):     -- Background including prior tobacco use, asthma, morbid obesity, Parkinson's disease, dysphagia. -- Admission 2.5 weeks ago with aspiration pneumonitis versus pneumonia, with acute respiratory failure; expected leukocytosis, lactate and PCT not impressive, but given how sick she was, completely reasonable to treat with Abx for actual bacterial pneumonia. -- Another episode of sepsis and hypoxemia late last week, this time with a higher WBC count and higher PCT level, this time not quite so sick (able to be managed with BiPAP, not intubation), but again, given the lab changes and the severity of the acute illness, and the findings on CT scan, I think very reasonable to treat with antibiotics for presumed nosocomial bacterial pneumonia, not just a sterile pneumonitis. The vancomycin seems wise, given the MRSA colonization, and the meropenem to cover nosocomial GNRs empirically. Seems to be making some slow, steady improvement in the last couple of days. -- FARHEEN slowly resolving.    -- Thrush. Treatment recs:     Would simply continue current vancomycin - meropenem for now, for nosocomial aspiration pneumonia. Tentative plan about a week of therapy. Continue Diflucan for thrush. Ongoing speech / swallowing therapy, aspiration precautions, etc.     Watch for Cdiff, allergic reaction, signs of line infection, DVT, etc.     D/W her family, speech therapy.      Electronically signed by Desiree Remy MD on 7/5/2022 at 8:52 AM.

## 2022-07-05 NOTE — PROGRESS NOTES
Shift assessment was completed (see flow sheet). A/O, denies any pain or other needs at this time. Spouse at bedside. Respirations are even, labored at times, with Rhonchi sounds. On 5L O2 via NC- Bipap off by RT 16/8. Scheduled medications to follow- whole with Applesauce per SLP. Infusing:  kvo (See MAR). SLP at bedside for reevaluation. Call light within reach. Bed in lowest position. Bed alarm on. Will continue to monitor. Patient is not able to demonstrated the ability to move from a reclining position to an upright position within the recliner.  however patient is alert, oriented and able to provide informed consent

## 2022-07-05 NOTE — PROGRESS NOTES
Inpatient Occupational Therapy Re Evaluation and Treatment    Unit: ICU  Date:  7/5/2022  Patient Name:    Mitch Ronquillo  Admitting diagnosis:  Shortness of breath [R06.02]  Septicemia ([facility] [A41.9]  Chronic anemia [D64.9]  Acute respiratory failure with hypoxia ([facility] [J96.01]  Aspiration pneumonia of left lower lobe, unspecified aspiration pneumonia type ([facility] [J69.0]  Chronic kidney disease, unspecified CKD stage [N18.9]  Admit Date:  6/17/2022  Precautions/Restrictions/WB Status/ Lines/ Wounds/ Oxygen: Fall risk, Lines -IV, Supplemental O2 (5), Zimmerman catheter and ., Telemetry, Continuous pulse oximetry and Isolation Precautions: Contact Blind left eye, Parkinson's on Sinemet. Pt transferred to ICU on 6-17-22 - Worsening shortness of breath and respiratory distress    PT intubated on 6-18-22 6-27 bipap, 6-28   Rapid response 7/1 transfer to ICU, Caren Pete OT orders 7/4    Treatment Time: 3296-3131  Treatment Number: 1   Timed code treatment minutes 53  minutes   Total Treatment minutes:  63    minutes    Patient Goals for Therapy:  \" get up out of bed  \"      Discharge Recommendations: SNF  DME needs for discharge: defer to facility       Therapy recommendations for staff:   Assist of 2 for bed mobility     History of Present Illness: per H&P on 6-17-22    80 y.o. female with a past medical history of atrial fibrillation on Eliquis, CKD, Parkinson's disease, diabetes, and sleep apnea who presents to the ED complaining of shortness of breath. The patient states that she has been short of breath since yesterday. She describes a cough, states that her throat hurts from coughing. She denies any sputum production. She denies fever body aches or chills. She does state that she has leg swelling however her  states it is chronic in nature and not worse than usual.  She denies chest pain. She does live at home with her . He states she does have frequent aspiration when eating.     history of a spinal fusion and a carotid endarterectomy   Home Health S4 Level Recommendation:  NA  AM-PAC Score: 6    Preadmission Environment  Taken from initial evaluation  Pt. Lives with spouse  Home environment:  one story home  Steps to enter first floor: ramp  Steps to second floor: N/A  Bathroom: walk in shower, shower seat , grab bars, raised commode   Equipment owned: RW, wc (manual), BSC and Bi pap, HHN, electric W/C     Preadmission Status:  Pt. Able to drive: No  Pt Fully independent with ADLs: No  assists   Pt. Required assistance from family for: Cleaning, Cooking and Laundry   Pt. independent for transfers and gait and walked with Rollator  History of falls No  Sleeps in a recliner, Therapy 1x a week    Pain  No      Cognition    A&O Person, Place and Time -situation   Able to follow 2 step commands    Subjective  Patient lying supine in bed with spouse present. Pt agreeable to this OT eval & tx. Upper Extremity ROM:    Limited AROM  Right shld no AROM, AAROM 0-90, elbow AAROM, able to make a fist and move wrist in all planes  Left shld AROM 0-90, elbow AROM, able to make a fist and move wrist in all planes    Upper Extremity Strength:    Impaired strength,     Upper Extremity Sensation    WFL    Upper Extremity Proprioception:  NT    Coordination and Tone  Diminished    Balance  Functional Sitting Balance:  Impaired, varied sitting balance Min A to Max A   Functional Standing Balance:NT    Bed mobility:    Supine to sit: Max A  and 2 persons  Sit to supine:   Max A  and 2 persons  Rolling:    Not Tested  Scooting in sitting:   Total A and 2 persons  Scooting to head of bed:   Total A x2    Bridging:   Not Tested    Transfers:  Unable to tolerate  Sit to stand:  Not Tested  Stand to sit:  Not Tested  Bed to chair:   Not Tested  Standard toilet: Not Tested  Bed to [facility]:  Not Tested    Dressing:      UE:   Not Tested  LE:    Total assist    Bathing:    UE:  Not Tested  LE:  Max A     Eating:   Not Tested    Toileting:  Not Tested    Activity Tolerance   Pt completed therapy session with No adverse symptoms noted w/activity  Supine BP: 144/65,  EOB: 154/108  EOB BP: 147/67  SPO2: remained 90% and above    Positioning Needs:   PT in bed     Exercise / Activities Initiated:   Hand flex/ext:  x10  Wrist flex/ext:  x10  Elbow flex/ext  Shoulder flex/ext:  x5  Shoulder shrugs:  x5    Patient/Family Education:   Role of OT    Assessment of Deficits: Pt seen for Occupational therapy evaluation in acute care setting. Pt demonstrated decreased Activity tolerance, ADLs, Balance , Bed mobility, ROM, Strength and Transfers. Pt functioning below baseline and will likely benefit from skilled occupational therapy services to maximize safety and independence. Goal(s) : To be met in 3 Visits:  1). Bed to toilet/BSC: Max A  with appropriate AD     To be met in 5 Visits:  1). Supine to/from Sit:  Max A   2). Upper Body Bathing:   Min A   3). Lower Body Bathing:   Max A   4). Upper Body Dressing:  Min A   5). Lower Body Dressing:  Max A   6). Pt to demonstrate UE exs x 15 reps with minimal cues    Rehabilitation Potential:  Fair for goals listed above. Strengths for achieving goals include: Pt cooperative  Barriers to achieving goals include:  Complexity of condition     Plan: To be seen 3-5 x/wk while in acute care setting for therapeutic exercises, bed mobility, transfers, dressing, bathing, family/patient education, ADL/IADL retraining, energy conservation training.      Barby Ferraro OTR/L 1932      If patient discharges from this facility prior to next visit, this note will serve as the Discharge Summary

## 2022-07-05 NOTE — PROGRESS NOTES
Speech Language Pathology  Facility/Department: [facility] ICU  Dysphagia Daily Treatment Note      Recommendations:  Solid Consistency: IDDSI 4 Puree Solids  Liquid Consistency: IDDSI 2 Mildly Thick (nectar) Liquids / no straws  Medication: Meds crushed in puree as able   If pt has overt s/s of aspiration with PO intake or worsening respiratory status, recommend downgrade to strict NPO pending re-assessment by SLP      NAME: Madonna Shannon  : 1940  MRN: [id number]    Patient Diagnosis(es):   Patient Active Problem List    Diagnosis Date Noted   Zaki Benderian 2022    MRSA colonization 2022    Chronic anemia     Mild protein-calorie malnutrition ([address] 2022    Aspiration pneumonia of both lower lobes ([address]     Sensorineural hearing loss, bilateral 2022    Laceration of left lower extremity     Pulmonary nodules     C. difficile colitis 10/07/2021    Chest pain 2021    Pulmonary HTN ([address]     Mitral valve insufficiency     Sialadenitis     Colonic pseudoobstruction     Acute kidney injury superimposed on CKD ([address]     Chronic kidney disease 2020    Partial symptomatic epilepsy with complex partial seizures, not intractable, without status epilepticus ([address]     Diastolic congestive heart failure ([address] 2019    COPD (chronic obstructive pulmonary disease) ([address] 12/10/2018    PAF (paroxysmal atrial fibrillation) (HCC)     Sepsis with acute hypoxic respiratory failure without septic shock ([address] 2018    Severe obstructive sleep apnea     Hepatic steatosis     Diverticulosis     Blind left eye     Parkinson disease ([address] 2018    CAD (coronary artery disease)     Morbid obesity with BMI of 40.0-44.9, adult ([address] 02/10/2017    Type 2 diabetes mellitus with stage 4 chronic kidney disease, without long-term current use of insulin ([address] 02/10/2017    Moderate persistent asthma 2016    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-sided sciatica 08/09/2016    Hypothyroidism 01/27/2015    Postural dizziness 02/20/2013    Anemia of chronic disease     GERD (gastroesophageal reflux disease)     Essential hypertension     Hyperlipidemia     Restless leg syndrome     L carotid artery stenosis s/p CEA 01/01/2002     Allergies: Allergies   Allergen Reactions    Levaquin [Levofloxacin]      Pt was given in er and developed hives and redness    Metoclopramide     Phenazopyridine     Pyridium [Phenazopyridine Hcl]     Reglan [Metoclopramide Hcl]     Reglan [Metoclopramide Hcl]      tremors       Subjective: Pt seen upright in bed, alert and agreeable to therapy, RN OK'sulaiman SLP entry and therapy, pt's  at bedside. Pain: no c/o pain    Current Diet: ADULT DIET; Dysphagia - Pureed; Mildly Thick (Nectar)    Diet Tolerance: Pt reports tolerating current diet with no clinical s/s of aspiration    Dysphagia Treatment and Impressions:   Pt seen in room at bedside with RN permission, Fito SLP f/u and PO trials.  Chart Review/Interview: Pt s/p rapid response 7/1 with transfer to ICU. Dysphagia f/u attempted 7/2, held d/t respiratory status. Pt was downgraded to strict NPO per MD at that time. Pt cleared for oral diet and was seen by speech yesterday with recs for puree solids, IDDSI 2 mildly thick (nectar) liquids with no straws, and meds crushed in puree as able.  FEES completed 6/30 with recommendations for a pureed diet with nectar/mildly thick liquids and crushable meds crushed in puree   Respiratory Status: Pt with SPO2% of 92-94% on 5 LPM HFNC with RR of 27-32/min. Weak (perceptually), congested, and non productive cough at baseline. Jovita at bedside.  Liquid PO Trials:    IDDSI 2 Mildly Thick (nectar):  Assessed via tsp (5cc) and cup: no anterior bolus loss , suspect reduced/impaired A-P bolus transit, suspect premature bolus loss into pharynx (as noted on FEES),  and no clinical s/s of aspiration.    Solid PO Trials   IDDSI 4 Puree:   no anterior bolus loss , suspect reduced/impaired A-P bolus transit, suspect premature bolus loss into pharynx (as noted on FEES), oral clearance grossly WFL and no clinical s/s of aspiration     Education: SLP edu pt re: Role of SLP, Rationale for dysphagia tx, Recommended compensatory strategies, Aspiration precautions, POC and FEES results. Pt verbalized understanding, would benefit from ongoing education and RN aware of recommendations   Assessment: Pt tolerating recommended diet with no clinical s/s of aspiration. Some carryover of recommended compensatory strategies   Recommendations: SLP recommends to continue with IDDSI 4 Puree Solids; IDDSI 2 Mildly Thick (nectar) Liquids / no straws; Meds crushed in puree as able  Risk Management: upright for all intake, stay upright for at least 30 mins after intake, small bites/sips, assist feed, no straws, alternate bites/sips, slow rate of intake, general aspiration precautions and hold PO and contact SLP if s/s of aspiration or worsening respiratory status develop. Dysphagia Goals:  Timeframe for Long-term Goals: 7 days (6/25/22)  Goal 1: Pt will tolerate recommended diet w/o exhibiting overt s/s dysphagia. 7/5/2022: Ongoing, progressing. Short-term Goals  Timeframe for Short-term Goals: 5 days (6/23/22)  1) The patient will tolerate repeat bedside swallowing evaluation when able. 7/5/2022: Goal met. 2) The patient will tolerate instrumental swallowing procedure. FEES completed 6/30 7/5/2022: Goal met, FEES completed 06/30/2022. 3) The patient will tolerate recommended diet without observed clinical signs of aspiration. 7/5/2022: Goal addressed, see above. Ongoing, progressing.     Speech/Language/Cog Goals: N/A      Recommendations:  Solid Consistency: IDDSI 4 Puree Solids  Liquid Consistency: IDDSI 2 Mildly Thick (nectar) Liquids / no straws  Medication: Meds crushed in puree as able   If pt has overt s/s of aspiration with PO intake or worsening respiratory status, recommend downgrade to strict NPO pending re-assessment by SLP    Patient/Family/Caregiver Education: Role of SLP, Rationale for dysphagia tx, Aspiration precautions and POC    Compensatory Strategies: Upright for all intake, Remain upright at least 30 mins after intake, No straws, Small bites, Small sips and Take pills crushed with applesauce/pudding    Plan:    Continued Dysphagia treatment with goals per plan of care. Discharge Recommendations: Continued ST recommended after d/c     If pt discharges from hospital prior to Speech/Swallowing discharge, this note serves as tx and discharge summary.      Total Treatment Time / Charges     Time in Time out Total Time / units   Cognitive Tx         Speech Tx      Dysphagia Tx [id number] 21 min / 1 unit     Signature:  [address]  Clinician

## 2022-07-05 NOTE — PROGRESS NOTES
07/04/22 2205   NIV Type   NIV Started/Stopped On   Equipment Type v60   Mode Bilevel   Mask Type Full face mask   Mask Size Medium   Settings/Measurements   IPAP 16 cmH20   CPAP/EPAP 8 cmH2O   Rate Ordered 16   Resp 19   FiO2  35 %   Vt Exhaled 394 mL   Minute Volume 13.1 Liters   Mask Leak (lpm) 5 lpm   Comfort Level Good   Using Accessory Muscles No   SpO2 91   Alarm Settings   Alarms On Y   Low Pressure 8 cmH2O   High Pressure  35 cmH2O   Delay Alarm 20 sec(s)   RR Low  14   RR High 55 br/min   Oxygen Therapy/Pulse Ox   Heart Rate 81   SpO2 (!) 85 %

## 2022-07-05 NOTE — PROGRESS NOTES
RT Inhaler-Nebulizer Bronchodilator Protocol Note    There is a bronchodilator order in the chart from a provider indicating to follow the RT Bronchodilator Protocol and there is an Initiate RT Inhaler-Nebulizer Bronchodilator Protocol order as well (see protocol at bottom of note). CXR Findings:  XR CHEST PORTABLE    Result Date: 7/3/2022  Airspace disease within the mid to lower lung zones is slightly increased as compared to prior. Possible small left pleural effusion. The findings from the last RT Protocol Assessment were as follows:   History Pulmonary Disease: (P) Chronic pulmonary disease  Respiratory Pattern: (P) Dyspnea on exertion or RR 21-25 bpm  Breath Sounds: (P) Slightly diminished and/or crackles  Cough: (P) Weak, productive  Indication for Bronchodilator Therapy: (P) On home bronchodilators  Bronchodilator Assessment Score: (P) 8    Aerosolized bronchodilator medication orders have been revised according to the RT Inhaler-Nebulizer Bronchodilator Protocol below. Respiratory Therapist to perform RT Therapy Protocol Assessment initially then follow the protocol. Repeat RT Therapy Protocol Assessment PRN for score 0-3 or on second treatment, BID, and PRN for scores above 3. No Indications - adjust the frequency to every 6 hours PRN wheezing or bronchospasm, if no treatments needed after 48 hours then discontinue using Per Protocol order mode. If indication present, adjust the RT bronchodilator orders based on the Bronchodilator Assessment Score as indicated below. Use Inhaler orders unless patient has one or more of the following: on home nebulizer, not able to hold breath for 10 seconds, is not alert and oriented, cannot activate and use MDI correctly, or respiratory rate 25 breaths per minute or more, then use the equivalent nebulizer order(s) with same Frequency and PRN reasons based on the score.   If a patient is on this medication at home then do not decrease Frequency below that used at home. 0-3 - enter or revise RT bronchodilator order(s) to equivalent RT Bronchodilator order with Frequency of every 4 hours PRN for wheezing or increased work of breathing using Per Protocol order mode. 4-6 - enter or revise RT Bronchodilator order(s) to two equivalent RT bronchodilator orders with one order with BID Frequency and one order with Frequency of every 4 hours PRN wheezing or increased work of breathing using Per Protocol order mode. 7-10 - enter or revise RT Bronchodilator order(s) to two equivalent RT bronchodilator orders with one order with TID Frequency and one order with Frequency of every 4 hours PRN wheezing or increased work of breathing using Per Protocol order mode. 11-13 - enter or revise RT Bronchodilator order(s) to one equivalent RT bronchodilator order with QID Frequency and an Albuterol order with Frequency of every 4 hours PRN wheezing or increased work of breathing using Per Protocol order mode. Greater than 13 - enter or revise RT Bronchodilator order(s) to one equivalent RT bronchodilator order with every 4 hours Frequency and an Albuterol order with Frequency of every 2 hours PRN wheezing or increased work of breathing using Per Protocol order mode.          Electronically signed by Heather Pizano RCP on 7/5/2022 at 8:23 AM

## 2022-07-05 NOTE — PROGRESS NOTES
Pulmonary & Critical Care Medicine ICU Progress Note    CC: shortness of breath     Events of Last 24 hours:   Off IVF   BiPAP overnight 16/8 60% FiO2 - this am on 5LPM   Tolerating diet       Vascular lines: IV: 6/19/2022 PICC    MV: 6/18/2022 -6/27/22  Vent Mode: AC/VC Resp Rate (Set): 0 bmp/Vt (Set, mL): 0 mL/ /FiO2 : 60 %  No results for input(s): PHART, HDP4PAJ, PO2ART in the last 72 hours. IV:   dextrose 50 mL/hr at 07/05/22 0617    sodium chloride      sodium chloride         Vitals:  Blood pressure (!) 164/72, pulse 81, temperature 99.5 °F (37.5 °C), temperature source Bladder, resp. rate 29, height 5' 6\" (1.676 m), weight 259 lb 7.7 oz (117.7 kg), SpO2 94 %, not currently breastfeeding. On  5 L  Constitutional:  In no distress. Eyes: PERRL. Conjunctivae anicteric. ENT: Normal nose. Normal tongue. Neck:  Trachea is midline. No thyroid tenderness. Respiratory: Mild accessory muscle usage. decreased breath sounds. No wheezes. No rales. Bilateral Rhonchi. Cardiovascular: Normal S1S2. No digit clubbing. No digit cyanosis. +  LE edema. Gastrointestinal: No mass palpated. No tenderness palpated. No umbilical hernia. Skin: No rash on the exposed extremities. No Nodules or induration on exposed extremities. Psychiatric: No anxiety or Agitation. Alert and Oriented to person, place and time.       Scheduled Meds:   vancomycin  750 mg IntraVENous Once    ipratropium-albuterol  1 ampule Inhalation Q4H While awake    meropenem  1,000 mg IntraVENous Q12H    fluconazole  200 mg IntraVENous Q24H    vancomycin (VANCOCIN) intermittent dosing (placeholder)   Other RX Placeholder    metoprolol  5 mg IntraVENous Q6H    metoprolol tartrate  25 mg Oral BID    pantoprazole  40 mg IntraVENous BID    chlorhexidine  15 mL Mouth/Throat BID    sodium chloride flush  10 mL IntraVENous 2 times per day    apixaban  2.5 mg Oral BID    atorvastatin  40 mg Oral Nightly    carbidopa-levodopa  1 tablet Oral 4x Daily    DULoxetine  60 mg Oral Daily    lamoTRIgine  50 mg Oral BID    montelukast  10 mg Oral Daily    propafenone  150 mg Oral 3 times per day       Data:  CBC:   Recent Labs     07/03/22  0745 07/04/22  0424 07/05/22  0439   WBC 34.4* 28.5* 19.9*   HGB 10.5* 9.3* 9.3*   HCT 33.0* 29.3* 29.8*   MCV 85.7 85.7 89.9    273 246     BMP:   Recent Labs     07/03/22  0625 07/03/22  0745 07/04/22  0424 07/05/22  0439     --  142 135*   K  --  3.4* 3.6 3.9     --  107 104   CO2 17*  --  24 16*   BUN 46*  --  43* 40*   CREATININE 2.2*  --  2.1* 2.0*     LIVER PROFILE:   No results for input(s): AST, ALT, LIPASE, BILIDIR, BILITOT, ALKPHOS in the last 72 hours. Invalid input(s): AMYLASE,  ALB    Microbiology:  6/17/2022 SARS-CoV-2 and influenza are negative  6/17/2022 MRSA DNA screen positive  6/17/2022 blood NG  6/17/2022 sputum NRF  6/18/2022 tracheal aspirate NRF  6/19/2022 BAL Candida  6/19/2022 respiratory viral panel negative  7/1/22 Blood cx NGTD  7/3/22 PCT 0.93    Imaging:  CT chest 7/3/22  images reviewed by me and showed:   1. Multifocal bronchopneumonia, with progression since 06/17/2022.   2. Small bilateral pleural effusions  3. Narrowing of the AP diameter of the trachea  4. Mild cardiomegaly        ASSESSMENT:  · Acute hypoxemic respiratory failure   · Aspiration pneumonia -recurrent  · Was extubated and weaned to room air and home CPAP but aspirated again and returned to the ICU  · BETSY on BiPAP 22/15 with 1 L bleed in   · Metabolic acidosis likely due to FARHEEN  · Parkinson's disease  · Atrial fibrillation  · Acute on chronic kidney failure, baseline creatinine 1.6  · Acute on chronic anemia, s/p 2 U PRBC 6/23/2022  · H/O esophagitis  · Pleural effusions are small bilaterally      PLAN:  Bipap for respiratory distress  Supplemental oxygen to maintain SaO2 >92%; wean as tolerated   NT suctioning  Vanc and Merrem D#5.  S/p 10 days cefepime & vancomycin  Fluconazole D#5 per IM  ID following  F/u cx  SLP following  Nephrology following   Increase lopressor 50 BID and d/c IV lopressor   NGT for TF if not tolerating diet   Blood sugar control ISS, with goal 150-180  DVT prophylaxis: Eliquis  DNI -confirmed again today  Discussed with  at the bedside

## 2022-07-05 NOTE — PROGRESS NOTES
Reassessment completed (see Flowsheet). All ICU lines remain intact, ICU monitoring continued-   Infusing:  Fadi Peed. pt remains on 5L. Per RT: NT suctioning required to help patient clear secretions. Lung sounds Rhonchi. pt's blood pressures WDL. Spouse at bedside and fed patient. Continuing to monitor.

## 2022-07-05 NOTE — PROGRESS NOTES
RT Inhaler-Nebulizer Bronchodilator Protocol Note    There is a bronchodilator order in the chart from a provider indicating to follow the RT Bronchodilator Protocol and there is an Initiate RT Inhaler-Nebulizer Bronchodilator Protocol order as well (see protocol at bottom of note). CXR Findings:  XR CHEST PORTABLE    Result Date: 7/3/2022  Airspace disease within the mid to lower lung zones is slightly increased as compared to prior. Possible small left pleural effusion. The findings from the last RT Protocol Assessment were as follows:   History Pulmonary Disease: Chronic pulmonary disease  Respiratory Pattern: Mild dyspnea at rest, irregular pattern, or RR 21-25 bpm  Breath Sounds: Slightly diminished and/or crackles  Cough: Weak, productive  Indication for Bronchodilator Therapy: On home bronchodilators  Bronchodilator Assessment Score: 10    Aerosolized bronchodilator medication orders have been revised according to the RT Inhaler-Nebulizer Bronchodilator Protocol below. Respiratory Therapist to perform RT Therapy Protocol Assessment initially then follow the protocol. Repeat RT Therapy Protocol Assessment PRN for score 0-3 or on second treatment, BID, and PRN for scores above 3. No Indications - adjust the frequency to every 6 hours PRN wheezing or bronchospasm, if no treatments needed after 48 hours then discontinue using Per Protocol order mode. If indication present, adjust the RT bronchodilator orders based on the Bronchodilator Assessment Score as indicated below. Use Inhaler orders unless patient has one or more of the following: on home nebulizer, not able to hold breath for 10 seconds, is not alert and oriented, cannot activate and use MDI correctly, or respiratory rate 25 breaths per minute or more, then use the equivalent nebulizer order(s) with same Frequency and PRN reasons based on the score.   If a patient is on this medication at home then do not decrease Frequency below that used at home. 0-3 - enter or revise RT bronchodilator order(s) to equivalent RT Bronchodilator order with Frequency of every 4 hours PRN for wheezing or increased work of breathing using Per Protocol order mode. 4-6 - enter or revise RT Bronchodilator order(s) to two equivalent RT bronchodilator orders with one order with BID Frequency and one order with Frequency of every 4 hours PRN wheezing or increased work of breathing using Per Protocol order mode. 7-10 - enter or revise RT Bronchodilator order(s) to two equivalent RT bronchodilator orders with one order with TID Frequency and one order with Frequency of every 4 hours PRN wheezing or increased work of breathing using Per Protocol order mode. 11-13 - enter or revise RT Bronchodilator order(s) to one equivalent RT bronchodilator order with QID Frequency and an Albuterol order with Frequency of every 4 hours PRN wheezing or increased work of breathing using Per Protocol order mode. Greater than 13 - enter or revise RT Bronchodilator order(s) to one equivalent RT bronchodilator order with every 4 hours Frequency and an Albuterol order with Frequency of every 2 hours PRN wheezing or increased work of breathing using Per Protocol order mode.        Electronically signed by Soraya Mann RCP on 7/4/2022 at 8:09 PM

## 2022-07-05 NOTE — PROGRESS NOTES
Hospitalist PCU Progress Note      PCP: Christian Bhakta MD    Date of Admission: 6/17/2022    Hospital Course:     admitted for resp failure from CHF , pna, and unable to manage own secretions with advanced parkinson's, eventually intubated    Prolonged vent support - there were discussions about trach and PEG support, though able to extubate eventually. 6/28-patient was extubated at 1:57 PM on 6/27/2022. She was then placed on BiPAP. She is getting periodic breaks from BiPAP. This morning she is on BiPAP and able to follow commands. O2 daytime and BiPAP HS. Secretions remain a major problem. Resp culture (BAL) growing candida - started antifungal.       Repeat emesis 7/1 and spiking high fever and worsening O2 requirement - she was transferred back to ICU on 7/1 overnight. Subjective:   MS Sonido Diop seen awake, alert and better oriented.  More mobile today   Reports right hand swelling  Remains on 5 L o2 , poor cough response noted        Medications:  Reviewed    Infusion Medications    dextrose 50 mL/hr at 07/05/22 0617    sodium chloride      sodium chloride       Scheduled Medications    vancomycin  750 mg IntraVENous Once    ipratropium-albuterol  1 ampule Inhalation Q4H While awake    meropenem  1,000 mg IntraVENous Q12H    fluconazole  200 mg IntraVENous Q24H    vancomycin (VANCOCIN) intermittent dosing (placeholder)   Other RX Placeholder    metoprolol  5 mg IntraVENous Q6H    metoprolol tartrate  25 mg Oral BID    pantoprazole  40 mg IntraVENous BID    chlorhexidine  15 mL Mouth/Throat BID    sodium chloride flush  10 mL IntraVENous 2 times per day    apixaban  2.5 mg Oral BID    atorvastatin  40 mg Oral Nightly    carbidopa-levodopa  1 tablet Oral 4x Daily    DULoxetine  60 mg Oral Daily    lamoTRIgine  50 mg Oral BID    montelukast  10 mg Oral Daily    propafenone  150 mg Oral 3 times per day     PRN Meds: morphine, carboxymethylcellulose PF, sodium SPECGRAV 1.010 07/01/2022 10:58 PM    GLUCOSEU Negative 07/01/2022 10:58 PM       Radiology:  CT CHEST WO CONTRAST   Final Result   1. Multifocal bronchopneumonia, with progression since 06/17/2022.   2. Small bilateral pleural effusions. 3. Narrowing of the AP diameter of the trachea. 4. Mild cardiomegaly. XR CHEST PORTABLE   Final Result   Airspace disease within the mid to lower lung zones is slightly increased as   compared to prior. Possible small left pleural effusion. CT ABDOMEN PELVIS WO CONTRAST Additional Contrast? None   Final Result   1. Nonspecific, nonobstructive gaseous bowel pattern. 2. Extensive diverticulosis of the left colon, lacking acute inflammatory   changes. 3. Bibasilar consolidations, atelectasis versus pneumonia. Pleural effusions   have resolved since 06/23/2022. XR ABDOMEN (KUB) (SINGLE AP VIEW)   Final Result   1. Gaseous distention of the stomach, otherwise, unremarkable bowel gas   pattern. XR CHEST PORTABLE   Final Result   Stable chest.  Persistent left lower lobe opacification with probable   effusion. XR CHEST PORTABLE   Final Result   Persistent left lower lobe opacification and effusion. Interval clearing of   perihilar edema and right basilar opacification and effusion. XR CHEST PORTABLE   Final Result   No significant interval change in bilateral airspace disease and small   effusions. Unchanged positioning of support devices. XR CHEST PORTABLE   Final Result   Increasing basilar opacities with probable small effusions. Unchanged positioning of support devices. XR CHEST PORTABLE   Final Result   1. No significant change. XR CHEST PORTABLE   Final Result   No significant interval change in basilar predominant airspace disease and   small left greater than right pleural effusions. Pulmonary vascular   congestion.          CT ABDOMEN PELVIS WO CONTRAST Additional Contrast? None   Final Result   Colonic diverticulosis without acute diverticulitis. Nonspecific rectal wall   thickening. Partially visualized pleural effusions with adjacent compressive   atelectasis. XR CHEST PORTABLE   Final Result   Again identified is bibasilar airspace disease with small pleural effusions   and mild increased right perihilar infiltrates. This may be related to   atelectasis or pneumonia. Cardiomegaly. XR CHEST PORTABLE   Final Result   Stable examination with bilateral small effusions and bibasilar atelectasis. Stable ET tube and enteric tube. XR CHEST PORTABLE   Final Result   No acute process. Bibasilar hypoaeration      Stable cardiomegaly         IR PICC WO SQ PORT/PUMP > 5 YEARS   Final Result      XR CHEST PORTABLE   Final Result   Supportive tubing projects in stable position. Partial regression of atelectasis in the right lung base. Persisting   consolidation or or atelectasis in the left lung base. XR CHEST PORTABLE   Final Result   1. Endotracheal tube terminates in appropriate position above the nirali. 2.  Enteric tube terminates in the body of the stomach. 3.  Basilar opacities again demonstrated, right greater than left. Interval   improved aeration of the left lung base. XR CHEST PORTABLE   Final Result   Increasing opacity in the right base, lobar atelectasis versus accumulating   right pleural effusion. Combination of both entities or underlying pneumonia   also possible. Persisting left basilar airspace disease and small pleural effusion. XR CHEST PORTABLE   Final Result   Hazy bilateral lobe airspace opacities and small left pleural effusion. This   could represent atelectasis or pneumonia in the appropriate clinical setting. CT CHEST WO CONTRAST   Final Result   1. Respiratory motion in the lower lungs. The right base has linear and   dependent atelectasis.   The left does have atelectatic changes but could also   have some patchy opacities from pneumonia or aspiration at the left base. 2.  There is no pleural effusion or pneumothorax. 3.  Narrowing of the AP diameter of the trachea with bulging of the   membranous portion due to the gas distended but nondilated esophagus. XR CHEST PORTABLE   Final Result   1. Bibasilar heterogeneous opacities may represent subsegmental atelectasis   and/or mild pulmonary edema. Underlying infection is difficult to exclude. Short-term follow-up PA and lateral chest radiographs may be helpful for   further evaluation. 2.  Cardiomegaly. Assessment/Plan:    Principal Problem:    Acute respiratory failure with hypoxia (HCC)  Active Problems:    Shortness of breath    Aspiration pneumonia of left lower lobe (HCC)    Mild protein-calorie malnutrition (HCC)    Chronic anemia    Parkinson disease (HCC)    Septicemia (HCC)    Chronic kidney disease  Resolved Problems:    * No resolved hospital problems. *           Acute Hypox Resp Failure   Aspiration PNA   Treated with  IV Zosyn and vancomycin. Changed Zosyn to cefepime. DCd vancomycin 6/22. Finished 10 days of Vanco and cefepime  Intubated 6/18. Extubated on 6/27/2022.  -On BiPAP. Alternate BiPAP with nasal cannula oxygen.    - SLP involved. - resp culture from BAL is growing candida - started diflucan on 7/1/2022   - spiking high fever 7/1 - back on Abx with Vanc and merrem IV.    - now on 5 L o2 , weaning as able               - poor physical condition limiting clearance of resp secretions       Sepsis POA - due to above. Recurrent with aspiration  Improving with abx        Dysphagia. Abnormal CT  Cough with every meal intake per family report. Underlying Parkinson's. I ordered for MBS and esophagram, though these will unlikely be done till she is better clinically. Failing bedside SLP   - continues to struggle with secretions.     - back to NPO status.    - CT abd pelvis 7/2 - actually fairly unremarkable. Consider placing NGT tomorrow for tube feeds if still n.p.o.      Melena  GI bleed   drop in H/H   GI consulted   H&H held stable. Has since resumed eliquis  IV PPI BID        FARHEEN on CKD IIIB  Likely secondary to ATN in the setting of pneumonia, hypotension and elevated vancomycin levels  Had lasix gtt earlier this admission. Does not appear fluid overloaded now and with no PO intake - off diuretics. Will need IVF - reassess closely.        Parkinson's disease  on Sinemet.         Afib Paroxysmal   Rythmol   Resumed Eliquis.        HTN - BP on the low side. Nifedipine stopped due to this and also leg edema. Consider alternative when restarting.      DVT Prophylaxis: Eliquis  eliquis held for bloody secretions seen during bronch and melena - and has since been resumed. Diet: ADULT DIET; Dysphagia - Pureed; Mildly Thick (Nectar)  Code Status: Limited    Cc. Prognosis guarded. Still struggling with secretions. Noted limited code now - DNI.        Naya Quigley MD 7/5/2022 7:53 AM

## 2022-07-05 NOTE — PROGRESS NOTES
[facility] Progress Note           Subjective/   80y.o. year old female who we are seeing in consultation for FARHEEN/CKD. HPI:  Patient is getting changed during my rounds    ROS: No fever or chills. Social: Family at bedside.     Objective/   GEN:  Chronically ill, BP (!) 173/69   Pulse 88   Temp 99.5 °F (37.5 °C) (Bladder)   Resp 26   Ht 5' 6\" (1.676 m)   Wt 259 lb 7.7 oz (117.7 kg)   SpO2 96%   BMI 41.88 kg/m²    From 7/4  HEENT: non-icteric, no JVD  CV: S1, S2 without m/r/g; ++ LE edema  RESP: Decreased breath sounds  ABD: +bs, soft, nt, no hsm  SKIN: warm, no rashes    Data/  Recent Labs     07/03/22  0745 07/04/22  0424 07/05/22  0439   WBC 34.4* 28.5* 19.9*   HGB 10.5* 9.3* 9.3*   HCT 33.0* 29.3* 29.8*   MCV 85.7 85.7 89.9    273 246     Recent Labs     07/03/22  0625 07/03/22  0745 07/04/22  0424 07/05/22  0439     --  142 135*   K  --  3.4* 3.6 3.9     --  107 104   CO2 17*  --  24 16*   GLUCOSE 148*  --  118* 122*   MG  --  2.00  --   --    BUN 46*  --  43* 40*   CREATININE 2.2*  --  2.1* 2.0*   LABGLOM 21*  --  23* 24*   GFRAA 26*  --  27* 29*     Component      Latest Ref Rng & Units 6/22/2022 6/22/2022           4:10 PM  4:10 PM   Color, UA      Straw/Yellow Yellow    Clarity, UA      Clear Clear    Glucose, UA      Negative mg/dL Negative    Bilirubin, Urine      Negative Negative    Ketones, Urine      Negative mg/dL TRACE (A)    Specific Gravity, UA      1.005 - 1.030 1.025    Blood, Urine      Negative Negative    pH, UA      5.0 - 8.0 5.5    Protein, UA      Negative mg/dL TRACE (A)    Urobilinogen, Urine      <2.0 E.U./dL 0.2    Nitrite, Urine      Negative Negative    Leukocyte Esterase, Urine      Negative Negative    Microscopic Examination       YES    Urine Type       NotGiven    Mucus, UA      None Seen /LPF Rare (A)    WBC, UA      0 - 5 /HPF 10-20 (A)    RBC, UA      0 - 4 /HPF 3-4    Epithelial Cells, UA      0 - 5 /HPF 0-1 Bacteria, UA      None Seen /HPF 2+ (A)    Sodium, Ur      Not Established mmol/L  <20   Potassium, Ur      Not Established mmol/L  26.3   Chloride      Not Established mmol/L  <20       Assessment/     - Acute kidney injury - multi-factorial ATN injury in setting of aspiration PNA, hypotension, & elevated vancomycin levels     - Chronic kidney disease stage 3b - background DM/HTN/CHF              SCr 1.6              Follows with [facility]     - Acute hypoxemic respiratory failure/Aspiration PNA/Pulmonary edema - on ventilator     - Diastolic CHF     - Atrial fibrillation    - Anemia - prn prbc's     - Hypertension    - Hypokalemia - prn replacement    -Metabolic acidosis    -Nausea vomiting on 7/1 defer to internal medicine  ? Ileus    Plan/   -discontinue D5W at 50 ml/h   -Serial renal panel    ____________________________________  Antonio Espana MD  [facility]  [email or web address]  Office: [phone]

## 2022-07-05 NOTE — PROGRESS NOTES
07/05/22 0255   NIV Type   Equipment Type v60   Mode Bilevel   Mask Type Full face mask   Mask Size Medium   Settings/Measurements   IPAP 16 cmH20   CPAP/EPAP 8 cmH2O   Rate Ordered 16   Resp 26   FiO2  60 %   Vt Exhaled 656 mL   Minute Volume 14.5 Liters   Mask Leak (lpm) 4 lpm   Comfort Level Good   Using Accessory Muscles No   SpO2 94   Alarm Settings   Alarms On Y   Low Pressure 8 cmH2O   High Pressure  35 cmH2O   Delay Alarm 20 sec(s)   RR Low  14   RR High 55 br/min   Oxygen Therapy/Pulse Ox   Heart Rate 79   SpO2 93 %

## 2022-07-05 NOTE — PROGRESS NOTES
RT Inhaler-Nebulizer Bronchodilator Protocol Note    There is a bronchodilator order in the chart from a provider indicating to follow the RT Bronchodilator Protocol and there is an Initiate RT Inhaler-Nebulizer Bronchodilator Protocol order as well (see protocol at bottom of note). CXR Findings:  No results found. The findings from the last RT Protocol Assessment were as follows:   History Pulmonary Disease: Chronic pulmonary disease  Respiratory Pattern: Mild dyspnea at rest, irregular pattern, or RR 21-25 bpm  Breath Sounds: Inspiratory and expiratory or bilateral wheezing and/or rhonchi  Cough: Weak, productive  Indication for Bronchodilator Therapy: On home bronchodilators  Bronchodilator Assessment Score: 14    Aerosolized bronchodilator medication orders have been revised according to the RT Inhaler-Nebulizer Bronchodilator Protocol below. Respiratory Therapist to perform RT Therapy Protocol Assessment initially then follow the protocol. Repeat RT Therapy Protocol Assessment PRN for score 0-3 or on second treatment, BID, and PRN for scores above 3. No Indications - adjust the frequency to every 6 hours PRN wheezing or bronchospasm, if no treatments needed after 48 hours then discontinue using Per Protocol order mode. If indication present, adjust the RT bronchodilator orders based on the Bronchodilator Assessment Score as indicated below. Use Inhaler orders unless patient has one or more of the following: on home nebulizer, not able to hold breath for 10 seconds, is not alert and oriented, cannot activate and use MDI correctly, or respiratory rate 25 breaths per minute or more, then use the equivalent nebulizer order(s) with same Frequency and PRN reasons based on the score. If a patient is on this medication at home then do not decrease Frequency below that used at home.     0-3 - enter or revise RT bronchodilator order(s) to equivalent RT Bronchodilator order with Frequency of every 4

## 2022-07-05 NOTE — PROGRESS NOTES
Reassessment completed (see Flowsheet). All ICU lines remain intact, ICU monitoring continued-   Infusing:  Grace Perks.     pt remains on 5L. Lung sounds Rhonchi. Continued coughing after Oral intake. SpO2 remains the same. 94% on 4L NC  Worked with PT/OT    pt's blood pressures WDL. Spouse at bedside and fed patient.    Continuing to monitor.

## 2022-07-05 NOTE — CARE COORDINATION
INTERDISCIPLINARY PLAN OF CARE CONFERENCE    Date/Time: 7/5/2022 11:27 AM  Completed by: JENNIFER Street  Case Management    Patient Name:  Kelly Chandra  YOB: 1940  Admitting Diagnosis: Shortness of breath [R06.02]  Septicemia ([facility] [address] [A41.9]  Chronic anemia [D64.9]  Acute respiratory failure with hypoxia ([facility] [address] [J96.01]  Aspiration pneumonia of left lower lobe, unspecified aspiration pneumonia type ([facility] [address] [J69.0]  Chronic kidney disease, unspecified CKD stage [N18.9]     Admit Date/Time:  6/17/2022 12:50 AM    Chart reviewed. Interdisciplinary team contacted or reviewed plan related to patient progress and discharge plans. Disciplines included Case Management, Nursing, and Dietitian. Current Status:ongoing   PT/OT recommendation for discharge plan of care: ordered and pending; SNF previous encounters    Expected D/C Disposition:  Skilled nursing facility  Confirmed plan with patient and/or family Yes confirmed with: (name) pt and pt's  at bedside    Discharge Plan Comments: Chart review completed. Pt transferred back to the ICU 7/1. Met with pt and her  at bedside. Both confirmed plan for a skilled nursing facility as pt is weak. Her  stated the goal is to get her stronger when at the SNF and back home. They are aware that both SNF's are following and writer will follow up with them today. They denied needs for CM.      [facility] and Beka Mike at the [facility] requesting a call back to get an update on referral.     Home O2 in place on admit: Yes    Addendum at 1:18pm: Received voicemail from Ness Jay at [facility] stating she will pull pt's information to have the team review    Addendum at 3:45pm: Second message left for Beka Mike at the [facility] requesting call back on the referral

## 2022-07-06 LAB
ANION GAP SERPL CALCULATED.3IONS-SCNC: 19 MMOL/L (ref 3–16)
BASOPHILS ABSOLUTE: 0 K/UL (ref 0–0.2)
BASOPHILS RELATIVE PERCENT: 0.3 %
BUN BLDV-MCNC: 36 MG/DL (ref 7–20)
CALCIUM SERPL-MCNC: 8.6 MG/DL (ref 8.3–10.6)
CHLORIDE BLD-SCNC: 105 MMOL/L (ref 99–110)
CO2: 18 MMOL/L (ref 21–32)
CREAT SERPL-MCNC: 2 MG/DL (ref 0.6–1.2)
EOSINOPHILS ABSOLUTE: 0.5 K/UL (ref 0–0.6)
EOSINOPHILS RELATIVE PERCENT: 3.2 %
GFR AFRICAN AMERICAN: 29
GFR NON-AFRICAN AMERICAN: 24
GLUCOSE BLD-MCNC: 101 MG/DL (ref 70–99)
GLUCOSE BLD-MCNC: 134 MG/DL (ref 70–99)
GLUCOSE BLD-MCNC: 134 MG/DL (ref 70–99)
GLUCOSE BLD-MCNC: 85 MG/DL (ref 70–99)
GLUCOSE BLD-MCNC: 87 MG/DL (ref 70–99)
GLUCOSE BLD-MCNC: 97 MG/DL (ref 70–99)
HCT VFR BLD CALC: 27.6 % (ref 36–48)
HEMOGLOBIN: 8.8 G/DL (ref 12–16)
LYMPHOCYTES ABSOLUTE: 0.8 K/UL (ref 1–5.1)
LYMPHOCYTES RELATIVE PERCENT: 5.5 %
MAGNESIUM: 1.6 MG/DL (ref 1.8–2.4)
MCH RBC QN AUTO: 28.4 PG (ref 26–34)
MCHC RBC AUTO-ENTMCNC: 32 G/DL (ref 31–36)
MCV RBC AUTO: 88.8 FL (ref 80–100)
MONOCYTES ABSOLUTE: 1.2 K/UL (ref 0–1.3)
MONOCYTES RELATIVE PERCENT: 8.3 %
NEUTROPHILS ABSOLUTE: 12.1 K/UL (ref 1.7–7.7)
NEUTROPHILS RELATIVE PERCENT: 82.7 %
PDW BLD-RTO: 14.9 % (ref 12.4–15.4)
PERFORMED ON: ABNORMAL
PERFORMED ON: NORMAL
PERFORMED ON: NORMAL
PHOSPHORUS: 4.3 MG/DL (ref 2.5–4.9)
PLATELET # BLD: 296 K/UL (ref 135–450)
PMV BLD AUTO: 8.5 FL (ref 5–10.5)
POTASSIUM SERPL-SCNC: 4.2 MMOL/L (ref 3.5–5.1)
RBC # BLD: 3.11 M/UL (ref 4–5.2)
SODIUM BLD-SCNC: 142 MMOL/L (ref 136–145)
VANCOMYCIN RANDOM: 12.2 UG/ML
WBC # BLD: 14.7 K/UL (ref 4–11)

## 2022-07-06 PROCEDURE — 6360000002 HC RX W HCPCS: Performed by: INTERNAL MEDICINE

## 2022-07-06 PROCEDURE — 2580000003 HC RX 258: Performed by: INTERNAL MEDICINE

## 2022-07-06 PROCEDURE — 6370000000 HC RX 637 (ALT 250 FOR IP): Performed by: INTERNAL MEDICINE

## 2022-07-06 PROCEDURE — C9113 INJ PANTOPRAZOLE SODIUM, VIA: HCPCS | Performed by: INTERNAL MEDICINE

## 2022-07-06 PROCEDURE — 80048 BASIC METABOLIC PNL TOTAL CA: CPT

## 2022-07-06 PROCEDURE — 92526 ORAL FUNCTION THERAPY: CPT

## 2022-07-06 PROCEDURE — 80202 ASSAY OF VANCOMYCIN: CPT

## 2022-07-06 PROCEDURE — 97110 THERAPEUTIC EXERCISES: CPT

## 2022-07-06 PROCEDURE — 97112 NEUROMUSCULAR REEDUCATION: CPT

## 2022-07-06 PROCEDURE — 97535 SELF CARE MNGMENT TRAINING: CPT

## 2022-07-06 PROCEDURE — 97530 THERAPEUTIC ACTIVITIES: CPT

## 2022-07-06 PROCEDURE — 84100 ASSAY OF PHOSPHORUS: CPT

## 2022-07-06 PROCEDURE — 83735 ASSAY OF MAGNESIUM: CPT

## 2022-07-06 PROCEDURE — 36415 COLL VENOUS BLD VENIPUNCTURE: CPT

## 2022-07-06 PROCEDURE — 85025 COMPLETE CBC W/AUTO DIFF WBC: CPT

## 2022-07-06 PROCEDURE — 99233 SBSQ HOSP IP/OBS HIGH 50: CPT | Performed by: INTERNAL MEDICINE

## 2022-07-06 PROCEDURE — 94660 CPAP INITIATION&MGMT: CPT

## 2022-07-06 PROCEDURE — 2700000000 HC OXYGEN THERAPY PER DAY

## 2022-07-06 PROCEDURE — 2000000000 HC ICU R&B

## 2022-07-06 PROCEDURE — 99291 CRITICAL CARE FIRST HOUR: CPT | Performed by: INTERNAL MEDICINE

## 2022-07-06 PROCEDURE — 94761 N-INVAS EAR/PLS OXIMETRY MLT: CPT

## 2022-07-06 PROCEDURE — 94640 AIRWAY INHALATION TREATMENT: CPT

## 2022-07-06 RX ORDER — MAGNESIUM SULFATE IN WATER 40 MG/ML
2000 INJECTION, SOLUTION INTRAVENOUS ONCE
Status: COMPLETED | OUTPATIENT
Start: 2022-07-06 | End: 2022-07-06

## 2022-07-06 RX ORDER — SODIUM BICARBONATE 650 MG/1
650 TABLET ORAL 2 TIMES DAILY
Status: DISCONTINUED | OUTPATIENT
Start: 2022-07-06 | End: 2022-07-08

## 2022-07-06 RX ORDER — SODIUM CHLORIDE FOR INHALATION 3 %
4 VIAL, NEBULIZER (ML) INHALATION 2 TIMES DAILY
Status: DISCONTINUED | OUTPATIENT
Start: 2022-07-06 | End: 2022-07-06

## 2022-07-06 RX ORDER — FLUCONAZOLE 100 MG/1
200 TABLET ORAL DAILY
Status: DISCONTINUED | OUTPATIENT
Start: 2022-07-07 | End: 2022-07-07

## 2022-07-06 RX ORDER — ACETYLCYSTEINE 200 MG/ML
600 SOLUTION ORAL; RESPIRATORY (INHALATION) 2 TIMES DAILY
Status: DISCONTINUED | OUTPATIENT
Start: 2022-07-06 | End: 2022-07-19 | Stop reason: HOSPADM

## 2022-07-06 RX ORDER — DIMETHICONE, OXYBENZONE, AND PADIMATE O 2; 2.5; 6.6 G/100G; G/100G; G/100G
STICK TOPICAL
Status: COMPLETED
Start: 2022-07-06 | End: 2022-07-07

## 2022-07-06 RX ADMIN — DULOXETINE HYDROCHLORIDE 60 MG: 60 CAPSULE, DELAYED RELEASE ORAL at 08:55

## 2022-07-06 RX ADMIN — MONTELUKAST SODIUM 10 MG: 10 TABLET, COATED ORAL at 08:55

## 2022-07-06 RX ADMIN — SODIUM CHLORIDE, PRESERVATIVE FREE 10 ML: 5 INJECTION INTRAVENOUS at 08:55

## 2022-07-06 RX ADMIN — IPRATROPIUM BROMIDE AND ALBUTEROL SULFATE 1 AMPULE: 2.5; .5 SOLUTION RESPIRATORY (INHALATION) at 15:33

## 2022-07-06 RX ADMIN — MEROPENEM 1000 MG: 1 INJECTION, POWDER, FOR SOLUTION INTRAVENOUS at 18:04

## 2022-07-06 RX ADMIN — IPRATROPIUM BROMIDE AND ALBUTEROL SULFATE 1 AMPULE: 2.5; .5 SOLUTION RESPIRATORY (INHALATION) at 23:44

## 2022-07-06 RX ADMIN — CARBIDOPA AND LEVODOPA 1 TABLET: 25; 100 TABLET ORAL at 08:55

## 2022-07-06 RX ADMIN — PANTOPRAZOLE SODIUM 40 MG: 40 INJECTION, POWDER, LYOPHILIZED, FOR SOLUTION INTRAVENOUS at 08:55

## 2022-07-06 RX ADMIN — APIXABAN 2.5 MG: 5 TABLET, FILM COATED ORAL at 08:55

## 2022-07-06 RX ADMIN — VANCOMYCIN HYDROCHLORIDE 1250 MG: 10 INJECTION, POWDER, LYOPHILIZED, FOR SOLUTION INTRAVENOUS at 09:02

## 2022-07-06 RX ADMIN — PANTOPRAZOLE SODIUM 40 MG: 40 INJECTION, POWDER, LYOPHILIZED, FOR SOLUTION INTRAVENOUS at 20:19

## 2022-07-06 RX ADMIN — ACETYLCYSTEINE 600 MG: 200 INHALANT RESPIRATORY (INHALATION) at 20:18

## 2022-07-06 RX ADMIN — SODIUM CHLORIDE, PRESERVATIVE FREE 10 ML: 5 INJECTION INTRAVENOUS at 20:19

## 2022-07-06 RX ADMIN — PROPAFENONE HYDROCHLORIDE 150 MG: 150 TABLET, FILM COATED ORAL at 06:01

## 2022-07-06 RX ADMIN — METOPROLOL TARTRATE 50 MG: 50 TABLET, FILM COATED ORAL at 08:55

## 2022-07-06 RX ADMIN — IPRATROPIUM BROMIDE AND ALBUTEROL SULFATE 1 AMPULE: 2.5; .5 SOLUTION RESPIRATORY (INHALATION) at 08:04

## 2022-07-06 RX ADMIN — FLUCONAZOLE 200 MG: 2 INJECTION, SOLUTION INTRAVENOUS at 09:05

## 2022-07-06 RX ADMIN — IPRATROPIUM BROMIDE AND ALBUTEROL SULFATE 1 AMPULE: 2.5; .5 SOLUTION RESPIRATORY (INHALATION) at 20:18

## 2022-07-06 RX ADMIN — Medication 15 ML: at 08:54

## 2022-07-06 RX ADMIN — MEROPENEM 1000 MG: 1 INJECTION, POWDER, FOR SOLUTION INTRAVENOUS at 05:59

## 2022-07-06 RX ADMIN — MAGNESIUM SULFATE HEPTAHYDRATE 2000 MG: 40 INJECTION, SOLUTION INTRAVENOUS at 10:36

## 2022-07-06 RX ADMIN — SODIUM BICARBONATE 650 MG: 650 TABLET ORAL at 08:55

## 2022-07-06 ASSESSMENT — PAIN SCALES - GENERAL: PAINLEVEL_OUTOF10: 0

## 2022-07-06 NOTE — PLAN OF CARE
Problem: Discharge Planning  Goal: Discharge to home or other facility with appropriate resources  Outcome: Progressing     Problem: Pain  Goal: Verbalizes/displays adequate comfort level or baseline comfort level  Outcome: Progressing     Problem: ABCDS Injury Assessment  Goal: Absence of physical injury  Outcome: Progressing     Problem: Skin/Tissue Integrity  Goal: Absence of new skin breakdown  Description: 1. Monitor for areas of redness and/or skin breakdown  2. Assess vascular access sites hourly  3. Every 4-6 hours minimum:  Change oxygen saturation probe site  4. Every 4-6 hours:  If on nasal continuous positive airway pressure, respiratory therapy assess nares and determine need for appliance change or resting period.   Outcome: Progressing     Problem: Chronic Conditions and Co-morbidities  Goal: Patient's chronic conditions and co-morbidity symptoms are monitored and maintained or improved  Outcome: Progressing     Problem: Safety - Adult  Goal: Free from fall injury  Outcome: Progressing     Problem: Cardiovascular - Adult  Goal: Maintains optimal cardiac output and hemodynamic stability  Outcome: Progressing  Goal: Absence of cardiac dysrhythmias or at baseline  Outcome: Progressing

## 2022-07-06 NOTE — PROGRESS NOTES
07/05/22 2316   NIV Type   Equipment Type v60   Mode Bilevel   Mask Type Full face mask   Mask Size Medium   Settings/Measurements   IPAP 16 cmH20   CPAP/EPAP 8 cmH2O   Rate Ordered 16   Resp 25   FiO2  50 %   Vt Exhaled 537 mL   Minute Volume 13.1 Liters   Mask Leak (lpm) 0 lpm   Comfort Level Good   Using Accessory Muscles No   SpO2 96   Patient's Home Machine No   Alarm Settings   Alarms On Y   Low Pressure 8 cmH2O   High Pressure  35 cmH2O   Delay Alarm 20 sec(s)   RR Low  14   RR High 55 br/min   Oxygen Therapy/Pulse Ox   Heart Rate 77   SpO2 99 %

## 2022-07-06 NOTE — PROGRESS NOTES
Inpatient Physical Therapy Daily Treatment Note    Unit: ICU  Date:  7/6/2022  Patient Name:    Monica Cowart  Admitting diagnosis:  Shortness of breath [R06.02]  Septicemia ([facility] [A41.9]  Chronic anemia [D64.9]  Acute respiratory failure with hypoxia ([facility] [J96.01]  Aspiration pneumonia of left lower lobe, unspecified aspiration pneumonia type ([facility] [J69.0]  Chronic kidney disease, unspecified CKD stage [N18.9]  Admit Date:  6/17/2022  Precautions/Restrictions:  Fall risk, Bed/chair alarm, Lines -IV, Supplemental O2 (6L/min of O2), Zimmerman catheter and PICC left, Telemetry, Continuous pulse oximetry and Isolation Precautions: Contact, 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 History of Present Illness: H & P as per Randee Diamond MD's note dated 6/17/2022  The patient is [phone] y.o. female Hx of Parkinson's disease, Afib on Eliquis, BETSY, CKD, lives at home with , who presents with SOB and cough. Pt reports cough and SOB and throat pain and odynophagia and dysphagia coughing with every meal. Her cough is deep and rhonchorous but she is struggling to bring up any secretions. She is not sure about fever. She was initially admitted to [facility] with concerns for aspiration PNA and new hypoxia. This am she is in resp distress, very poor and weak cough, O2 sats 70s despite 10-12l/min O2 - transitioned to NRB mask with sats improved to 92% and then transitioned to vapotherm. I ordered to transfer to ICU due to rapid worsening of oxygenation and pt struggling managing own secretion with very weak cough. Ordered 3% saline nebs and repeat CXR and Pulm evaluation.  Given rapid progression of hypoxia and clinical status I strongly suspect mucus plugging. She is to stay strict NPO.   6/27: Patient extubated. Repeat emesis 7/1 and spiking high fever and worsening O2 requirement - she was transferred back to ICU on 7/1 overnight. Home Health S4 Level Recommendation: NA  AM-PAC Mobility Score   AM-PAC Inpatient Mobility without Stair Climbing Raw Score : 7    Treatment Time: 1144 - 1250  Treatment number: 2  Timed Code Treatment Minutes: 66 minutes  Total Treatment Minutes:  66  minutes    Cognition    A&O x4   Able to follow 2 step commands    Subjective  Patient lying supine in bed with spouse present   Pt agreeable to this PT tx. Pain   Yes  Location: At the bottom  Rating:    mild/10  Pain Medicine Status: Denies need     Bed Mobility   Supine to Sit:    Max A  and 2 persons  Sit to Supine:   Max A  and 2 persons  Rolling: Max A   Scooting: Total A and 2 persons in supine and Max A x 2 person in sitting    Transfer Training     Sit to stand:   Not Tested  Stand to sit:   Not Tested  Bed to Chair:   Not Tested with use of N/A   Comment: Unsafe to attempt transfer training due to fatigue, low activity tolerance. Gait Training gait deferred due to difficulty with transfers; pt ambulated 0 ft. Stair Training deferred, pt does not have stairs in the home environment    Therapeutic Exercise all completed bilaterally unless indicated  AAROM given as follows  Ankle Pumps x 20 reps  Quad sets x 10 reps  Heel slides x 10 reps  LAQ x 10 reps  Lateral weight shifts in sitting using forearm support on the either sides x 3 reps on either side (Patient performed better on the L side than  R side due to LUE stronger than RUE)    Balance  Sitting:  Fair -; Min A  to CGA  Comments: ~28 min EOB sitting, intermittent verbal and tactile cueing needed for forward trunk lean to maintain upright posture and for avoiding L sided trunk lean. Standing: Not tested;  Not Tested  Comments: unsafe to attempt    Patient Education      Role of PT, POC, Discharge recommendations, DC recommendations, safety awareness, transfer techniques, pursed lip breathing, energy conservation, pacing activity and calling for assist with mobility. Positioning Needs       Pt in bed, alarm set, positioned in proper neutral alignment and pressure relief provided. Call light provided and all needs within reach    ROM Measurements N/A  Knee Flexion:  Knee Extension:     Activity Tolerance   Pt completed therapy session with SOB noted with all functional mobility. SpO2 (%)  HR (BPM) BP (mm/Hg)   Supine 93% on 6L/min  113/77   Sitting 77% recovered to baseline within 2 min with PLB 82 119/84   At the end of the session 99% 87      Other  N/A    Assessment :  Patient tolerated the session well and needed regular rest breaks today. Patient showed improvement in EOB sitting tolerance and improved to ~28 min. Patient improved sitting balance to fair - with CGA after initial assist level. Recommending SNF upon discharge as patient functioning well below baseline, demonstrates good rehab potential and unable to return home due to limited or no family support, burden of care beyond caregiver ability, home environment not conducive to patient recovery and limited safety awareness. Goals (all goals ongoing unless otherwise indicated)  To be met in 3 visits:  1). Independent with LE Ex x 10 reps     To be met in 6 visits:  1). Supine to/from sit: Mod A   2). Sit to/from stand: Mod A   3). Bed to chair: Mod A  and 2 persons  4). Patient will be able to sit at the EOB for ~10 min. Plan   Continue with plan of care. Signature: Darrick Sim, MS PT, # [id number]    If patient discharges from this facility prior to next visit, this note will serve as the Discharge Summary.

## 2022-07-06 NOTE — PROGRESS NOTES
SCD's removed for bath this morning. Pt given bath. Hair care, korey care, and mouth care complete. New linens provided. Pt turned to left side with pillow support. Ice chips provided. Morning meds started. Pt is A&O x 4. VSS. Pt briefly desat after switching off BiPAP to HFNC, pt currently on 6L. Morning news turned on for patient. Call light within reach, bed in lowest positon. Pt denied any further needs.

## 2022-07-06 NOTE — PROGRESS NOTES
Pt repositioned, all wrinkles in sheets removed. Pts mouth suctioned, mouth care complete. SCD's on BLE. Pt educated on the importance of shifting weight off of coccyx. Warm blanket provided. Call light in right hand. Pt denied any further needs.

## 2022-07-06 NOTE — PROGRESS NOTES
Inpatient Occupational Therapy Treatment    Unit: ICU  Date:  7/6/2022  Patient Name:    Lebron Stanley  Admitting diagnosis:  Shortness of breath [R06.02]  Septicemia [facility]) [A41.9]  Chronic anemia [D64.9]  Acute respiratory failure with hypoxia ([facility] [J96.01]  Aspiration pneumonia of left lower lobe, unspecified aspiration pneumonia type ([facility] [J69.0]  Chronic kidney disease, unspecified CKD stage [N18.9]  Admit Date:  6/17/2022  Precautions/Restrictions/WB Status/ Lines/ Wounds/ Oxygen: Fall risk, Lines -IV, Supplemental O2 (5), Zimmerman catheter and ., Telemetry, Continuous pulse oximetry and Isolation Precautions: Contact Blind left eye, Parkinson's on Sinemet. Pt transferred to ICU on 6-17-22 - Worsening shortness of breath and respiratory distress    PT intubated on 6-18-22 6-27 bipap, 6-28   Rapid response 7/1 transfer to ICU, Caren Pete OT orders 7/4    Treatment Time: 0268-0704  Treatment Number: 2  Timed code treatment minutes  66 minutes   Total Treatment minutes:  66  minutes    Patient Goals for Therapy:  \" Sit on the edge of bed  \"      Discharge Recommendations: SNF  DME needs for discharge: defer to facility       Therapy recommendations for staff:   Assist of 2 for bed mobility     History of Present Illness: per H&P on 6-17-22    80 y.o. female with a past medical history of atrial fibrillation on Eliquis, CKD, Parkinson's disease, diabetes, and sleep apnea who presents to the ED complaining of shortness of breath. The patient states that she has been short of breath since yesterday. She describes a cough, states that her throat hurts from coughing. She denies any sputum production. She denies fever body aches or chills. She does state that she has leg swelling however her  states it is chronic in nature and not worse than usual.  She denies chest pain. She does live at home with her . He states she does have frequent aspiration when eating.     history of a spinal fusion and a carotid endarterectomy   Home Health S4 Level Recommendation:  NA  AM-PAC Score: 9    Pain  No      Cognition    A&O Person, not otherwise addressed  Able to follow 2 step commands    Subjective  Patient lying supine in bed with spouse present. Pt agreeable to this OT tx. There Ex:  PROM to R UE  AROM R hand/finger flexion     Balance  Functional Sitting Balance:  Impaired, varied sitting balance Mod A to SBA   Functional Standing Balance:NT   Sat EOB for 25 mins. , up to 4 mins. Unsupported at a time due to posterior lean at times. Bed mobility:    Supine to sit: Max A  and 2 persons  Sit to supine:   Max A  and 2 persons  Rolling: Max A of 2  Scooting in sitting: Total A and 2 persons  Scooting to head of bed:   Total A x2    Bridging:   Not Tested    Transfers:  Unable to tolerate  Sit to stand:  Not Tested  Stand to sit:  Not Tested  Bed to chair:   Not Tested  Standard toilet: Not Tested  Bed to [facility]:  Not Tested    Dressing:      UE:   Max A to don gown  LE:    Not tested    Bathing:    UE:  Not Tested  LE:  Not tested    Eating: Max A to bring spoon with ice chip to mouth    Toileting: Total assist for wiping after small BM    Activity Tolerance   Pt completed therapy session with No adverse symptoms noted w/activity    SpO2 (%)  HR (BPM) BP (mm/Hg)   Supine 93% on 6L/min   113/77   Sitting 77% recovered to baseline within 2 min with PLB 82 119/84   After the session 99% 87          Positioning Needs:   Pt in bed positioned with pillows to promote edema control in B hands    Exercise / Activities Initiated:   Shoulder protraction/retraction in sitting x5    Patient/Family Education:   Role of OT    Assessment: Pt tolerated session well today with improved sitting tolerance, balance and overall activity tolerance. Pt continues to demonstrate decreased Activity tolerance, ADLs, Balance , Bed mobility, ROM, Strength and Transfers.  Pt functioning below baseline and will likely benefit from

## 2022-07-06 NOTE — PLAN OF CARE
Monitoring patient skin integrity for skin breakdown, turning and repositioning q2h per protocol. Call light and personal belongings within reach. Frequent visual monitoring continues. Toileting program inplace. Patient assisted in turning/repositioning at least once every 2 hours, and on a prn basis.

## 2022-07-06 NOTE — PROGRESS NOTES
07/06/22 0309   NIV Type   Mode Bilevel   Mask Type Full face mask   Mask Size Medium   Settings/Measurements   IPAP 16 cmH20   CPAP/EPAP 8 cmH2O   Rate Ordered 16   Resp 24   FiO2  50 %   Vt Exhaled 488 mL   Comfort Level Good   SpO2 99

## 2022-07-06 NOTE — PROGRESS NOTES
Pt sitting up in bed this evening. Pt more alert now and fully awake. Pt A&O x 4, VSS. Shift assessment complete and all meds given per MAR crushed in applesauce. Pt tolerated. Pt remains on 5L NC, VSS. Vitals:    07/05/22 2100   BP: (!) 157/108   Pulse: 86   Resp: 22   Temp:    SpO2: 94%     Pt stated she wanted to do her bath in the morning. HOB locked in high fowlers (aspiration precautions). Pt rating pain in her chest 4/10, pt denied any need for pharmaceutical intervention. Ice chips provided. Bed in lowest position, call light within reach with all personal belongings. All pt needs addressed.

## 2022-07-06 NOTE — PROGRESS NOTES
08:00 Pt awake a&o x4 , Assessment as charted, pt is weak  09:50 Critical care rounds completed @ the bedside w/ Dr. Letha Garcia   11:45 Speech at bedside and pt failed swallow eval. Including pureed, pt strict NPO, Pt will have FEES test tomorrow   12:12 PT/OT in room working w/ Pt , pt currently sitting at edge of bed  13:11 Pt remains awake a&o x4, VS remain stable reassessment as charted , Dr. Penny Pica aware pt failed swallow eval, per MD NG feeding tube if pt fails FEES tomorrow   15:42  Pt remains awake a&o x4, reassessment unchanged and as charted   19:00 Handoff report given to night nurse Verito Whatley , pt awake a&o x4, pt stable @ handoff

## 2022-07-06 NOTE — PROGRESS NOTES
Speech Language Pathology  Facility/Department: [facility] ICU  Dysphagia Daily Treatment Note    Recommendations:  Solid Consistency: NPO  Liquid Consistency: NPO and Ice chips with SLP/RN only AFTER ORAL CARE  Medication: Meds via alt means of nutrition       NAME: Ozzie Paul  : 1940  MRN: [id number]    Patient Diagnosis(es):   Patient Active Problem List    Diagnosis Date Noted   Drew Reynolds 2022    MRSA colonization 2022    BETSY (obstructive sleep apnea)     Metabolic acidosis     Chronic anemia     Mild protein-calorie malnutrition ([address] 2022    Aspiration pneumonia of left lower lobe (HCC)     Sensorineural hearing loss, bilateral 2022    Laceration of left lower extremity     Pulmonary nodules     C. difficile colitis 10/07/2021    Chest pain 2021    Pulmonary HTN ([address]     Mitral valve insufficiency     Sialadenitis     Colonic pseudoobstruction     Acute kidney injury superimposed on CKD ([address]     Chronic kidney disease 2020    Partial symptomatic epilepsy with complex partial seizures, not intractable, without status epilepticus ([address]     Diastolic congestive heart failure ([address] 2019    COPD (chronic obstructive pulmonary disease) ([address] 12/10/2018    PAF (paroxysmal atrial fibrillation) ([address]     Septicemia ([address] 2018    Severe obstructive sleep apnea     Hepatic steatosis     Diverticulosis     Blind left eye     Parkinson disease ([address] 2018    CAD (coronary artery disease)     Morbid obesity with BMI of 40.0-44.9, adult ([address] 02/10/2017    Type 2 diabetes mellitus with stage 4 chronic kidney disease, without long-term current use of insulin ([address] 02/10/2017    Moderate persistent asthma 2016    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-sided sciatica 2016    Hypothyroidism 2015    Postural dizziness 2013    Anemia of chronic disease     GERD (gastroesophageal reflux disease)     Essential hypertension     Hyperlipidemia     Restless leg syndrome     L carotid artery stenosis s/p CEA 01/01/2002     Allergies: Allergies   Allergen Reactions    Levaquin [Levofloxacin]      Pt was given in er and developed hives and redness    Metoclopramide     Phenazopyridine     Pyridium [Phenazopyridine Hcl]     Reglan [Metoclopramide Hcl]     Reglan [Metoclopramide Hcl]      tremors       Subjective: Pt seen upright in bed, alert and agreeable to therapy, RN OK'sulaiman SLP entry and therapy, pt's  at bedside. Pain: no c/o pain    Current Diet: ADULT DIET; Dysphagia - Pureed; Mildly Thick (Nectar)    Diet Tolerance: Pt reports tolerating current diet with no clinical s/s of aspiration    Dysphagia Treatment and Impressions:   Pt seen in room at bedside with RN permission, OK'sulaiman SLP f/u and PO trials.  Chart Review/Interview: Pt s/p rapid response 7/1 with transfer to ICU. Dysphagia f/u attempted 7/2, held d/t respiratory status. Pt was downgraded to strict NPO per MD at that time. Pt cleared for oral diet and was seen by speech 07/04 with recs for puree solids, IDDSI 2 mildly thick (nectar) liquids with no straws, and meds crushed in puree as able. Upon SLP entry pt had a strong, wet, non-productive cough. She stated that she cannot get anything up. Oral care was completed as a dry, brown coating was noted on pt's tongue. Of note, pt's  stated she ate her entire bowl of cream of wheat this morning with no clinical s/s of aspiration.  FEES completed 6/30 with recommendations for a pureed diet with nectar/mildly thick liquids and crushable meds crushed in puree   Respiratory Status: Pt with SPO2% of 91-95% on 5 LPM HFNC with RR of 27-32/min. Weak (perceptually), congested, and non productive cough at baseline. Jovita at bedside.     Liquid PO Trials:    IDDSI 3 Moderately Thick (honey):  Assessed via cup: no anterior bolus loss, suspect reduced/impaired A-P bolus transit, suspect premature bolus loss into pharynx (as noted on FEES), multiple audible swallows for each trial, delayed cough after 1/5 trials, and immediate cough after 2/5 trials.  IDDSI 3 Moderately Thick (honey): tsp (5cc) and cup: no anterior bolus loss, suspect functional A-P bolus transit, prolonged oral phase, swallow timing subjectively appears timely, and no clinical s/s of aspiration.  Solid PO Trials   IDDSI 4 Puree:   no anterior bolus loss , suspect reduced/impaired A-P bolus transit, suspect premature bolus loss into pharynx (as noted on FEES), oral clearance grossly WFL, and immediate cough after all trials.  Education: SLP edu pt re: Role of SLP, Rationale for dysphagia tx, Recommended compensatory strategies, Aspiration precautions, POC and FEES results. Pt verbalized understanding, would benefit from ongoing education and RN aware of recommendations   Assessment: Pt not tolerating recommended diet with no clinical s/s of aspiration. Some carryover of recommended compensatory strategies   Recommendations: SLP recommends to downgrade to NPO; NPO; Meds crushed in puree as able. Plan to complete FEES to assess pt's swallow function. Risk Management: upright for all intake, stay upright for at least 30 mins after intake, small bites/sips, assist feed, no straws, alternate bites/sips, slow rate of intake, general aspiration precautions and hold PO and contact SLP if s/s of aspiration or worsening respiratory status develop. Dysphagia Goals:  Goal Extended Timeframe for Long-term Goals: 7 days (07/13/22)  Goal 1: Pt will tolerate recommended diet w/o exhibiting overt s/s dysphagia. 7/6/2022: Ongoing, progressing. Short-term Goals  Goal Extended Timeframe for Short-term Goals: 5 days (07/11/22)  1) The patient will tolerate repeat bedside swallowing evaluation when able. 7/6/2022: Goal met. 2) The patient will tolerate instrumental swallowing procedure.  FEES completed 6/30 7/6/2022: Goal met, FEES completed 06/30/2022. 3) The patient will tolerate recommended diet without observed clinical signs of aspiration. 7/6/2022: Goal addressed, see above. Ongoing, not progressing. New Goal  4) The patient will tolerate repeat instrumental swallowing procedure. 7/6/2022: Plan to complete FEES    Speech/Language/Cog Goals: N/A      Recommendations:  Solid Consistency: NPO  Liquid Consistency: NPO and Ice chips with SLP/RN only AFTER ORAL CARE  Medication: Meds via alt means of nutrition     Patient/Family/Caregiver Education: Role of SLP, Rationale for dysphagia tx, Aspiration precautions and POC    Compensatory Strategies: Upright for all intake, Remain upright at least 30 mins after intake, No straws, Small bites, Small sips and Take pills crushed with applesauce/pudding    Plan:    Continued Dysphagia treatment with goals per plan of care. Discharge Recommendations: Continued ST recommended after d/c     If pt discharges from hospital prior to Speech/Swallowing discharge, this note serves as tx and discharge summary.      Total Treatment Time / Charges     Time in Time out Total Time / units   Cognitive Tx         Speech Tx      Dysphagia Tx 1116 1143 27 min / 1 unit     Signature:  [address]  Clinician

## 2022-07-06 NOTE — PROGRESS NOTES
[facility] Progress Note           Subjective/   80y.o. year old female who we are seeing in consultation for FARHEEN/CKD. HPI:  Patient is resting, remains on 5 to 6 L nasal cannula    Last 24-hour urine output charted at 850 mL    ROS: No fever or chills. Social: Family at bedside.     Objective/   GEN:  Chronically ill, /77   Pulse 82   Temp 99.1 °F (37.3 °C) (Bladder)   Resp (!) 31   Ht 5' 6\" (1.676 m)   Wt 285 lb 7.9 oz (129.5 kg)   SpO2 98%   BMI 46.08 kg/m²    From 7/4  HEENT: non-icteric, no JVD  CV: S1, S2 without m/r/g; ++ LE edema  RESP: Decreased breath sounds  ABD: +bs, soft, nt, no hsm  SKIN: warm, no rashes    Data/  Recent Labs     07/04/22 0424 07/05/22 0439 07/06/22  0446   WBC 28.5* 19.9* 14.7*   HGB 9.3* 9.3* 8.8*   HCT 29.3* 29.8* 27.6*   MCV 85.7 89.9 88.8    246 296     Recent Labs     07/04/22 0424 07/05/22 0439 07/06/22  0446    135* 142   K 3.6 3.9 4.2    104 105   CO2 24 16* 18*   GLUCOSE 118* 122* 97   PHOS  --   --  4.3   MG  --   --  1.60*   BUN 43* 40* 36*   CREATININE 2.1* 2.0* 2.0*   LABGLOM 23* 24* 24*   GFRAA 27* 29* 29*     Component      Latest Ref Rng & Units 6/22/2022 6/22/2022           4:10 PM  4:10 PM   Color, UA      Straw/Yellow Yellow    Clarity, UA      Clear Clear    Glucose, UA      Negative mg/dL Negative    Bilirubin, Urine      Negative Negative    Ketones, Urine      Negative mg/dL TRACE (A)    Specific Gravity, UA      1.005 - 1.030 1.025    Blood, Urine      Negative Negative    pH, UA      5.0 - 8.0 5.5    Protein, UA      Negative mg/dL TRACE (A)    Urobilinogen, Urine      <2.0 E.U./dL 0.2    Nitrite, Urine      Negative Negative    Leukocyte Esterase, Urine      Negative Negative    Microscopic Examination       YES    Urine Type       NotGiven    Mucus, UA      None Seen /LPF Rare (A)    WBC, UA      0 - 5 /HPF 10-20 (A)    RBC, UA      0 - 4 /HPF 3-4    Epithelial Cells, UA      0 - 5 /HPF 0-1 Bacteria, UA      None Seen /HPF 2+ (A)    Sodium, Ur      Not Established mmol/L  <20   Potassium, Ur      Not Established mmol/L  26.3   Chloride      Not Established mmol/L  <20       Assessment/     - Acute kidney injury - multi-factorial ATN injury in setting of aspiration PNA, hypotension, & elevated vancomycin levels     - Chronic kidney disease stage 3b - background DM/HTN/CHF              SCr 1.6              Follows with [facility]     - Acute hypoxemic respiratory failure/Aspiration PNA/Pulmonary edema - on ventilator     - Diastolic CHF     - Atrial fibrillation    - Anemia - prn prbc's     - Hypertension    - Hypokalemia - prn replacement    -Metabolic acidosis    -Nausea vomiting on 7/1 defer to internal medicine  Better    -Dysphagia, patient has been started on some puréed food    Plan/   -Lasix 40 mg IV x1  -Serial renal panel  -daily wts and strict i/o  -renal dose medications   -avoid nephrotoxins    ____________________________________  Keturah Klinefelter, MD  [facility]  [email or web address]  Office: [phone]

## 2022-07-06 NOTE — PROGRESS NOTES
promethazine **OR** ondansetron, acetaminophen **OR** acetaminophen, albuterol      Intake/Output Summary (Last 24 hours) at 7/6/2022 0636  Last data filed at 7/6/2022 0500  Gross per 24 hour   Intake 1063.59 ml   Output 850 ml   Net 213.59 ml       Physical Exam Performed:    BP (!) 144/75   Pulse 86   Temp 98.9 °F (37.2 °C) (Bladder)   Resp 28   Ht 5' 6\" (1.676 m)   Wt 285 lb 7.9 oz (129.5 kg)   SpO2 (!) 88%   BMI 46.08 kg/m²            General: elderly female up in bed, more Awake, alert and better oriented. Appears to be not in any distress  Mucous Membranes:  Pink , anicteric  Left opaque cornea  Neck: No JVD, no carotid bruit, no thyromegaly  Chest: diminished wesly with poor inspiratory effort and cough response   Cardiovascular:  RRR S1S2 heard, no murmurs or gallops  Abdomen:  Soft, obese  undistended, non tender, no organomegaly, BS present  Extremities: diffuse edema of right UE 3+   Left UE picc noted  No edema or cyanosis. Distal pulses well felt  Neurological : grossly normal with gen weakness         Labs:   Recent Labs     07/03/22  0745 07/04/22  0424 07/05/22  0439   WBC 34.4* 28.5* 19.9*   HGB 10.5* 9.3* 9.3*   HCT 33.0* 29.3* 29.8*    273 246     Recent Labs     07/03/22  0745 07/04/22  0424 07/05/22  0439   NA  --  142 135*   K 3.4* 3.6 3.9   CL  --  107 104   CO2  --  24 16*   BUN  --  43* 40*   CREATININE  --  2.1* 2.0*   CALCIUM 9.4 8.8 8.7     No results for input(s): AST, ALT, BILIDIR, BILITOT, ALKPHOS in the last 72 hours. No results for input(s): INR in the last 72 hours. No results for input(s): Nancy Edelstein in the last 72 hours.     Urinalysis:      Lab Results   Component Value Date/Time    NITRU Negative 07/01/2022 10:58 PM    [address] 0-2 07/01/2022 10:58 PM    BACTERIA 2+ 06/22/2022 04:10 PM    RBCUA 3-4 07/01/2022 10:58 PM    BLOODU TRACE-LYSED 07/01/2022 10:58 PM    SPECGRAV 1.010 07/01/2022 10:58 PM    GLUCOSEU Negative 07/01/2022 10:58 PM       Radiology:  CT CHEST WO CONTRAST   Final Result   1. Multifocal bronchopneumonia, with progression since 06/17/2022.   2. Small bilateral pleural effusions. 3. Narrowing of the AP diameter of the trachea. 4. Mild cardiomegaly. XR CHEST PORTABLE   Final Result   Airspace disease within the mid to lower lung zones is slightly increased as   compared to prior. Possible small left pleural effusion. CT ABDOMEN PELVIS WO CONTRAST Additional Contrast? None   Final Result   1. Nonspecific, nonobstructive gaseous bowel pattern. 2. Extensive diverticulosis of the left colon, lacking acute inflammatory   changes. 3. Bibasilar consolidations, atelectasis versus pneumonia. Pleural effusions   have resolved since 06/23/2022. XR ABDOMEN (KUB) (SINGLE AP VIEW)   Final Result   1. Gaseous distention of the stomach, otherwise, unremarkable bowel gas   pattern. XR CHEST PORTABLE   Final Result   Stable chest.  Persistent left lower lobe opacification with probable   effusion. XR CHEST PORTABLE   Final Result   Persistent left lower lobe opacification and effusion. Interval clearing of   perihilar edema and right basilar opacification and effusion. XR CHEST PORTABLE   Final Result   No significant interval change in bilateral airspace disease and small   effusions. Unchanged positioning of support devices. XR CHEST PORTABLE   Final Result   Increasing basilar opacities with probable small effusions. Unchanged positioning of support devices. XR CHEST PORTABLE   Final Result   1. No significant change. XR CHEST PORTABLE   Final Result   No significant interval change in basilar predominant airspace disease and   small left greater than right pleural effusions. Pulmonary vascular   congestion. CT ABDOMEN PELVIS WO CONTRAST Additional Contrast? None   Final Result   Colonic diverticulosis without acute diverticulitis. Nonspecific rectal wall   thickening. Partially visualized pleural effusions with adjacent compressive   atelectasis. XR CHEST PORTABLE   Final Result   Again identified is bibasilar airspace disease with small pleural effusions   and mild increased right perihilar infiltrates. This may be related to   atelectasis or pneumonia. Cardiomegaly. XR CHEST PORTABLE   Final Result   Stable examination with bilateral small effusions and bibasilar atelectasis. Stable ET tube and enteric tube. XR CHEST PORTABLE   Final Result   No acute process. Bibasilar hypoaeration      Stable cardiomegaly         IR PICC WO SQ PORT/PUMP > 5 YEARS   Final Result      XR CHEST PORTABLE   Final Result   Supportive tubing projects in stable position. Partial regression of atelectasis in the right lung base. Persisting   consolidation or or atelectasis in the left lung base. XR CHEST PORTABLE   Final Result   1. Endotracheal tube terminates in appropriate position above the nirali. 2.  Enteric tube terminates in the body of the stomach. 3.  Basilar opacities again demonstrated, right greater than left. Interval   improved aeration of the left lung base. XR CHEST PORTABLE   Final Result   Increasing opacity in the right base, lobar atelectasis versus accumulating   right pleural effusion. Combination of both entities or underlying pneumonia   also possible. Persisting left basilar airspace disease and small pleural effusion. XR CHEST PORTABLE   Final Result   Hazy bilateral lobe airspace opacities and small left pleural effusion. This   could represent atelectasis or pneumonia in the appropriate clinical setting. CT CHEST WO CONTRAST   Final Result   1. Respiratory motion in the lower lungs. The right base has linear and   dependent atelectasis. The left does have atelectatic changes but could also   have some patchy opacities from pneumonia or aspiration at the left base.       2. There is no pleural effusion or pneumothorax. 3.  Narrowing of the AP diameter of the trachea with bulging of the   membranous portion due to the gas distended but nondilated esophagus. XR CHEST PORTABLE   Final Result   1. Bibasilar heterogeneous opacities may represent subsegmental atelectasis   and/or mild pulmonary edema. Underlying infection is difficult to exclude. Short-term follow-up PA and lateral chest radiographs may be helpful for   further evaluation. 2.  Cardiomegaly. Assessment/Plan:    Principal Problem:    Septicemia ([address]  Active Problems:    Aspiration pneumonia of left lower lobe (HCC)    Mild protein-calorie malnutrition (HCC)    Chronic anemia    Thrush    MRSA colonization    BETSY (obstructive sleep apnea)    Metabolic acidosis    Parkinson disease (HCC)    Chronic kidney disease    Acute kidney injury superimposed on CKD (HCC)  Resolved Problems:    Shortness of breath    Acute respiratory failure with hypoxia (HCC)           Acute Hypox Resp Failure   Aspiration PNA   Treated with  IV Zosyn and vancomycin. Changed Zosyn to cefepime. DCd vancomycin 6/22. Finished 10 days of Vanco and cefepime  Intubated 6/18. Extubated on 6/27/2022.  -On BiPAP. Alternate BiPAP with nasal cannula oxygen.    - SLP involved. - resp culture from BAL is growing candida - started diflucan on 7/1/2022   - spiking high fever 7/1 - back on Abx with Vanc and merrem IV.    - now on 5 L o2 , weaning as able               - poor physical condition limiting clearance of resp secretions       Sepsis POA - due to above. Recurrent with aspiration  Improving with abx        Dysphagia. Abnormal CT  Cough with every meal intake per family report. Underlying Parkinson's. I ordered for MBS and esophagram, though these will unlikely be done till she is better clinically. Failing bedside SLP   - continues to struggle with secretions.     - back to NPO status.    - CT abd pelvis 7/2 - actually fairly unremarkable. - Consider placing NGT tomorrow for tube feeds if still does not pass SLP      Melena  GI bleed   drop in H/H   GI consulted   H&H held stable. Has since resumed eliquis  IV PPI BID        FARHEEN on CKD IIIB  Likely secondary to ATN in the setting of pneumonia, hypotension and elevated vancomycin levels  Had lasix gtt earlier this admission. Does not appear fluid overloaded now and with no PO intake - off diuretics. Will need IVF - reassess closely.        Parkinson's disease  on Sinemet.         Afib Paroxysmal   Rythmol   Resumed Eliquis.        HTN - BP on the low side. Nifedipine stopped due to this and also leg edema. Consider alternative when restarting.      DVT Prophylaxis: Eliquis  eliquis held for bloody secretions seen during bronch and melena - and has since been resumed. Diet: ADULT DIET; Dysphagia - Pureed; Mildly Thick (Nectar)  Code Status: Limited    Cc. Prognosis guarded. Still struggling with secretions. Updated  at bedside  Noted limited code now - DNI.        Perry Hughes MD 7/6/2022 6:36 AM

## 2022-07-06 NOTE — CONSULTS
7/6  Vanc random = 12.2 mcg/mL at 0446. Renal function has been slowly improving. Give vancomycin 1250 mg x1. Recheck vanc random tomorrow in the AM (7/7 at 0600).   Asael Mcgarry, PharmD  7/6/2022 6:01 AM

## 2022-07-06 NOTE — PROGRESS NOTES
Pulmonary & Critical Care Medicine ICU Progress Note    CC: shortness of breath     Events of Last 24 hours:   Off IVF   BiPAP overnight 16/8 60% FiO2   This am on 5LPM   Tolerating diet   Sounding congested  NT suctioning with thick secretions    Vascular lines: IV: 6/19/2022 PICC    MV: 6/18/2022 -6/27/22  Vent Mode: AC/VC Resp Rate (Set): 0 bmp/Vt (Set, mL): 0 mL/ /FiO2 : 50 %  No results for input(s): PHART, [id number], PO2ART in the last 72 hours. IV:   sodium chloride      sodium chloride         Vitals:  Blood pressure 136/69, pulse 82, temperature 98.5 °F (36.9 °C), temperature source Bladder, resp. rate 24, height 5' 6\" (1.676 m), weight 285 lb 7.9 oz (129.5 kg), SpO2 94 %, not currently breastfeeding. On  5 L  Constitutional:  In no distress. Eyes: PERRL. Conjunctivae anicteric. ENT: Normal nose. Normal tongue. Neck:  Trachea is midline. No thyroid tenderness. Respiratory: Mild accessory muscle usage. Decreased breath sounds. No wheezes. No rales. Bilateral rhonchi. Cardiovascular: Normal S1S2. No digit clubbing. No digit cyanosis. +  LE edema. Gastrointestinal: No mass palpated. No tenderness palpated. No umbilical hernia. Skin: No rash on the exposed extremities. No Nodules or induration on exposed extremities. Psychiatric: No anxiety or Agitation. Alert and Oriented to person, place and time.       Scheduled Meds:   vancomycin  1,250 mg IntraVENous Once    metoprolol tartrate  50 mg Oral BID    ipratropium-albuterol  1 ampule Inhalation Q4H While awake    meropenem  1,000 mg IntraVENous Q12H    fluconazole  200 mg IntraVENous Q24H    vancomycin (VANCOCIN) intermittent dosing (placeholder)   Other RX Placeholder    pantoprazole  40 mg IntraVENous BID    chlorhexidine  15 mL Mouth/Throat BID    sodium chloride flush  10 mL IntraVENous 2 times per day    apixaban  2.5 mg Oral BID    atorvastatin  40 mg Oral Nightly    carbidopa-levodopa  1 tablet Oral 4x Daily    DULoxetine  60 mg Oral Daily    lamoTRIgine  50 mg Oral BID    montelukast  10 mg Oral Daily    propafenone  150 mg Oral 3 times per day       Data:  CBC:   Recent Labs     07/03/22  0745 07/04/22 0424 07/05/22 0439   WBC 34.4* 28.5* 19.9*   HGB 10.5* 9.3* 9.3*   HCT 33.0* 29.3* 29.8*   MCV 85.7 85.7 89.9    273 246     BMP:   Recent Labs     07/03/22  0745 07/04/22 0424 07/05/22 0439   NA  --  142 135*   K 3.4* 3.6 3.9   CL  --  107 104   CO2  --  24 16*   BUN  --  43* 40*   CREATININE  --  2.1* 2.0*     LIVER PROFILE:   No results for input(s): AST, ALT, LIPASE, BILIDIR, BILITOT, ALKPHOS in the last 72 hours. Invalid input(s): AMYLASE,  ALB    Microbiology:  6/17/2022 SARS-CoV-2 and influenza are negative  6/17/2022 MRSA DNA screen positive  6/17/2022 blood NG  6/17/2022 sputum NRF  6/18/2022 tracheal aspirate NRF  6/19/2022 BAL Candida  6/19/2022 respiratory viral panel negative  7/1/22 Blood cx NGTD        Imaging:  CT chest 7/3/22  images reviewed by me and showed:   1. Multifocal bronchopneumonia, with progression since 06/17/2022.   2. Small bilateral pleural effusions  3. Narrowing of the AP diameter of the trachea  4. Mild cardiomegaly        ASSESSMENT:  · Acute hypoxemic respiratory failure   · Aspiration pneumonia -recurrent  · Pulmonary congestion  · Was extubated and weaned to room air and home CPAP but aspirated again and returned to the ICU  · BETSY on BiPAP 22/15 with 1 L bleed in   · Metabolic acidosis likely due to FARHEEN  · Parkinson's disease  · Atrial fibrillation  · Acute on chronic kidney failure, baseline creatinine 1.6  · Acute on chronic anemia, s/p 2 U PRBC 6/23/2022  · H/O esophagitis  · Pleural effusions are small bilaterally      PLAN:  Bipap for respiratory distress  Supplemental oxygen to maintain SaO2 >92%; wean as tolerated   NT suctioning  Mucomyst 3ML 20% inhalation BID -possible therapeutic bronchoscopy in a.m. if no improvement  hCantal D#6.  S/p 10 days cefepime & vancomycin  Fluconazole D#6 per IM  ID following  F/u cx  Diet and SLP following  Nephrology following   Lopressor 50 BID   Blood sugar control ISS, with goal 150-180  DVT prophylaxis: Eliquis  DNI  Discussed with  at the bedside

## 2022-07-07 LAB
ANION GAP SERPL CALCULATED.3IONS-SCNC: 15 MMOL/L (ref 3–16)
BASOPHILS ABSOLUTE: 0.1 K/UL (ref 0–0.2)
BASOPHILS RELATIVE PERCENT: 0.6 %
BUN BLDV-MCNC: 32 MG/DL (ref 7–20)
CALCIUM SERPL-MCNC: 9.4 MG/DL (ref 8.3–10.6)
CHLORIDE BLD-SCNC: 105 MMOL/L (ref 99–110)
CO2: 24 MMOL/L (ref 21–32)
CREAT SERPL-MCNC: 1.7 MG/DL (ref 0.6–1.2)
EOSINOPHILS ABSOLUTE: 0.5 K/UL (ref 0–0.6)
EOSINOPHILS RELATIVE PERCENT: 3.1 %
GFR AFRICAN AMERICAN: 35
GFR NON-AFRICAN AMERICAN: 29
GLUCOSE BLD-MCNC: 106 MG/DL (ref 70–99)
GLUCOSE BLD-MCNC: 125 MG/DL (ref 70–99)
GLUCOSE BLD-MCNC: 77 MG/DL (ref 70–99)
GLUCOSE BLD-MCNC: 84 MG/DL (ref 70–99)
GLUCOSE BLD-MCNC: 84 MG/DL (ref 70–99)
GLUCOSE BLD-MCNC: 94 MG/DL (ref 70–99)
GLUCOSE BLD-MCNC: 98 MG/DL (ref 70–99)
HCT VFR BLD CALC: 30 % (ref 36–48)
HEMOGLOBIN: 9.6 G/DL (ref 12–16)
LYMPHOCYTES ABSOLUTE: 0.9 K/UL (ref 1–5.1)
LYMPHOCYTES RELATIVE PERCENT: 5.5 %
MAGNESIUM: 2.1 MG/DL (ref 1.8–2.4)
MCH RBC QN AUTO: 27.4 PG (ref 26–34)
MCHC RBC AUTO-ENTMCNC: 31.9 G/DL (ref 31–36)
MCV RBC AUTO: 85.9 FL (ref 80–100)
MONOCYTES ABSOLUTE: 1.2 K/UL (ref 0–1.3)
MONOCYTES RELATIVE PERCENT: 7.9 %
NEUTROPHILS ABSOLUTE: 12.9 K/UL (ref 1.7–7.7)
NEUTROPHILS RELATIVE PERCENT: 82.9 %
PDW BLD-RTO: 14.6 % (ref 12.4–15.4)
PERFORMED ON: ABNORMAL
PERFORMED ON: ABNORMAL
PERFORMED ON: NORMAL
PHOSPHORUS: 4.2 MG/DL (ref 2.5–4.9)
PLATELET # BLD: 308 K/UL (ref 135–450)
PMV BLD AUTO: 8 FL (ref 5–10.5)
POTASSIUM SERPL-SCNC: 3.4 MMOL/L (ref 3.5–5.1)
RBC # BLD: 3.49 M/UL (ref 4–5.2)
SODIUM BLD-SCNC: 144 MMOL/L (ref 136–145)
VANCOMYCIN RANDOM: 21.6 UG/ML
WBC # BLD: 15.6 K/UL (ref 4–11)

## 2022-07-07 PROCEDURE — 0BC18ZZ EXTIRPATION OF MATTER FROM TRACHEA, VIA NATURAL OR ARTIFICIAL OPENING ENDOSCOPIC: ICD-10-PCS | Performed by: INTERNAL MEDICINE

## 2022-07-07 PROCEDURE — 6360000002 HC RX W HCPCS: Performed by: INTERNAL MEDICINE

## 2022-07-07 PROCEDURE — 6370000000 HC RX 637 (ALT 250 FOR IP): Performed by: INTERNAL MEDICINE

## 2022-07-07 PROCEDURE — 0B9G8ZX DRAINAGE OF LEFT UPPER LUNG LOBE, VIA NATURAL OR ARTIFICIAL OPENING ENDOSCOPIC, DIAGNOSTIC: ICD-10-PCS | Performed by: INTERNAL MEDICINE

## 2022-07-07 PROCEDURE — 31624 DX BRONCHOSCOPE/LAVAGE: CPT | Performed by: INTERNAL MEDICINE

## 2022-07-07 PROCEDURE — 99233 SBSQ HOSP IP/OBS HIGH 50: CPT | Performed by: INTERNAL MEDICINE

## 2022-07-07 PROCEDURE — 99152 MOD SED SAME PHYS/QHP 5/>YRS: CPT | Performed by: INTERNAL MEDICINE

## 2022-07-07 PROCEDURE — 80048 BASIC METABOLIC PNL TOTAL CA: CPT

## 2022-07-07 PROCEDURE — 31645 BRNCHSC W/THER ASPIR 1ST: CPT | Performed by: INTERNAL MEDICINE

## 2022-07-07 PROCEDURE — 87015 SPECIMEN INFECT AGNT CONCNTJ: CPT

## 2022-07-07 PROCEDURE — 87106 FUNGI IDENTIFICATION YEAST: CPT

## 2022-07-07 PROCEDURE — 2580000003 HC RX 258: Performed by: INTERNAL MEDICINE

## 2022-07-07 PROCEDURE — 94761 N-INVAS EAR/PLS OXIMETRY MLT: CPT

## 2022-07-07 PROCEDURE — 2709999900 HC NON-CHARGEABLE SUPPLY: Performed by: INTERNAL MEDICINE

## 2022-07-07 PROCEDURE — 3609010900 HC BRONCHOSCOPY THERAPUTIC ASPIRATION INITIAL: Performed by: INTERNAL MEDICINE

## 2022-07-07 PROCEDURE — 88112 CYTOPATH CELL ENHANCE TECH: CPT

## 2022-07-07 PROCEDURE — 87205 SMEAR GRAM STAIN: CPT

## 2022-07-07 PROCEDURE — 83735 ASSAY OF MAGNESIUM: CPT

## 2022-07-07 PROCEDURE — 94660 CPAP INITIATION&MGMT: CPT

## 2022-07-07 PROCEDURE — 92612 ENDOSCOPY SWALLOW (FEES) VID: CPT

## 2022-07-07 PROCEDURE — 0BC38ZZ EXTIRPATION OF MATTER FROM RIGHT MAIN BRONCHUS, VIA NATURAL OR ARTIFICIAL OPENING ENDOSCOPIC: ICD-10-PCS | Performed by: INTERNAL MEDICINE

## 2022-07-07 PROCEDURE — 87077 CULTURE AEROBIC IDENTIFY: CPT

## 2022-07-07 PROCEDURE — 2000000000 HC ICU R&B

## 2022-07-07 PROCEDURE — 2700000000 HC OXYGEN THERAPY PER DAY

## 2022-07-07 PROCEDURE — 80202 ASSAY OF VANCOMYCIN: CPT

## 2022-07-07 PROCEDURE — 87206 SMEAR FLUORESCENT/ACID STAI: CPT

## 2022-07-07 PROCEDURE — 99232 SBSQ HOSP IP/OBS MODERATE 35: CPT | Performed by: INTERNAL MEDICINE

## 2022-07-07 PROCEDURE — 0BC78ZZ EXTIRPATION OF MATTER FROM LEFT MAIN BRONCHUS, VIA NATURAL OR ARTIFICIAL OPENING ENDOSCOPIC: ICD-10-PCS | Performed by: INTERNAL MEDICINE

## 2022-07-07 PROCEDURE — C9113 INJ PANTOPRAZOLE SODIUM, VIA: HCPCS | Performed by: INTERNAL MEDICINE

## 2022-07-07 PROCEDURE — 94640 AIRWAY INHALATION TREATMENT: CPT

## 2022-07-07 PROCEDURE — 87116 MYCOBACTERIA CULTURE: CPT

## 2022-07-07 PROCEDURE — 92526 ORAL FUNCTION THERAPY: CPT

## 2022-07-07 PROCEDURE — 6370000000 HC RX 637 (ALT 250 FOR IP)

## 2022-07-07 PROCEDURE — 87070 CULTURE OTHR SPECIMN AEROBIC: CPT

## 2022-07-07 PROCEDURE — 87102 FUNGUS ISOLATION CULTURE: CPT

## 2022-07-07 PROCEDURE — 84100 ASSAY OF PHOSPHORUS: CPT

## 2022-07-07 PROCEDURE — 3609010800 HC BRONCHOSCOPY ALVEOLAR LAVAGE: Performed by: INTERNAL MEDICINE

## 2022-07-07 PROCEDURE — 94669 MECHANICAL CHEST WALL OSCILL: CPT

## 2022-07-07 PROCEDURE — 88305 TISSUE EXAM BY PATHOLOGIST: CPT

## 2022-07-07 PROCEDURE — 94150 VITAL CAPACITY TEST: CPT

## 2022-07-07 PROCEDURE — 85025 COMPLETE CBC W/AUTO DIFF WBC: CPT

## 2022-07-07 RX ORDER — MAGNESIUM SULFATE 1 G/100ML
1000 INJECTION INTRAVENOUS PRN
Status: DISCONTINUED | OUTPATIENT
Start: 2022-07-07 | End: 2022-07-19 | Stop reason: HOSPADM

## 2022-07-07 RX ORDER — FUROSEMIDE 10 MG/ML
40 INJECTION INTRAMUSCULAR; INTRAVENOUS ONCE
Status: COMPLETED | OUTPATIENT
Start: 2022-07-07 | End: 2022-07-07

## 2022-07-07 RX ORDER — POTASSIUM CHLORIDE 29.8 MG/ML
20 INJECTION INTRAVENOUS PRN
Status: DISCONTINUED | OUTPATIENT
Start: 2022-07-07 | End: 2022-07-19 | Stop reason: HOSPADM

## 2022-07-07 RX ORDER — MIDAZOLAM HYDROCHLORIDE 5 MG/ML
INJECTION INTRAMUSCULAR; INTRAVENOUS PRN
Status: DISCONTINUED | OUTPATIENT
Start: 2022-07-07 | End: 2022-07-07 | Stop reason: ALTCHOICE

## 2022-07-07 RX ORDER — FLUCONAZOLE 2 MG/ML
200 INJECTION, SOLUTION INTRAVENOUS EVERY 24 HOURS
Status: DISCONTINUED | OUTPATIENT
Start: 2022-07-07 | End: 2022-07-11

## 2022-07-07 RX ORDER — FENTANYL CITRATE 50 UG/ML
INJECTION, SOLUTION INTRAMUSCULAR; INTRAVENOUS PRN
Status: DISCONTINUED | OUTPATIENT
Start: 2022-07-07 | End: 2022-07-07 | Stop reason: ALTCHOICE

## 2022-07-07 RX ADMIN — PROPAFENONE HYDROCHLORIDE 150 MG: 150 TABLET, FILM COATED ORAL at 17:53

## 2022-07-07 RX ADMIN — APIXABAN 2.5 MG: 5 TABLET, FILM COATED ORAL at 21:21

## 2022-07-07 RX ADMIN — FLUCONAZOLE 200 MG: 2 INJECTION, SOLUTION INTRAVENOUS at 11:13

## 2022-07-07 RX ADMIN — CARBIDOPA AND LEVODOPA 1 TABLET: 25; 100 TABLET ORAL at 21:21

## 2022-07-07 RX ADMIN — MONTELUKAST SODIUM 10 MG: 10 TABLET, COATED ORAL at 14:20

## 2022-07-07 RX ADMIN — IPRATROPIUM BROMIDE AND ALBUTEROL SULFATE 1 AMPULE: 2.5; .5 SOLUTION RESPIRATORY (INHALATION) at 19:43

## 2022-07-07 RX ADMIN — DIMETHICONE, OXYBENZONE, AND PADIMATE O: 2; 2.5; 6.6 STICK TOPICAL at 03:43

## 2022-07-07 RX ADMIN — POTASSIUM CHLORIDE 20 MEQ: 29.8 INJECTION INTRAVENOUS at 06:43

## 2022-07-07 RX ADMIN — CARBIDOPA AND LEVODOPA 1 TABLET: 25; 100 TABLET ORAL at 14:20

## 2022-07-07 RX ADMIN — ACETYLCYSTEINE 600 MG: 200 INHALANT RESPIRATORY (INHALATION) at 08:10

## 2022-07-07 RX ADMIN — PANTOPRAZOLE SODIUM 40 MG: 40 INJECTION, POWDER, LYOPHILIZED, FOR SOLUTION INTRAVENOUS at 11:08

## 2022-07-07 RX ADMIN — SODIUM CHLORIDE, PRESERVATIVE FREE 10 ML: 5 INJECTION INTRAVENOUS at 21:47

## 2022-07-07 RX ADMIN — APIXABAN 2.5 MG: 5 TABLET, FILM COATED ORAL at 14:20

## 2022-07-07 RX ADMIN — IPRATROPIUM BROMIDE AND ALBUTEROL SULFATE 1 AMPULE: 2.5; .5 SOLUTION RESPIRATORY (INHALATION) at 08:10

## 2022-07-07 RX ADMIN — LAMOTRIGINE 50 MG: 25 TABLET ORAL at 14:20

## 2022-07-07 RX ADMIN — POTASSIUM CHLORIDE 20 MEQ: 29.8 INJECTION INTRAVENOUS at 08:09

## 2022-07-07 RX ADMIN — FUROSEMIDE 40 MG: 10 INJECTION, SOLUTION INTRAVENOUS at 10:18

## 2022-07-07 RX ADMIN — SODIUM BICARBONATE 650 MG: 650 TABLET ORAL at 14:19

## 2022-07-07 RX ADMIN — ATORVASTATIN CALCIUM 40 MG: 40 TABLET, FILM COATED ORAL at 21:21

## 2022-07-07 RX ADMIN — MEROPENEM 1000 MG: 1 INJECTION, POWDER, FOR SOLUTION INTRAVENOUS at 17:58

## 2022-07-07 RX ADMIN — Medication 15 ML: at 21:21

## 2022-07-07 RX ADMIN — SODIUM BICARBONATE 650 MG: 650 TABLET ORAL at 21:21

## 2022-07-07 RX ADMIN — METOPROLOL TARTRATE 50 MG: 50 TABLET, FILM COATED ORAL at 21:33

## 2022-07-07 RX ADMIN — IPRATROPIUM BROMIDE AND ALBUTEROL SULFATE 1 AMPULE: 2.5; .5 SOLUTION RESPIRATORY (INHALATION) at 15:46

## 2022-07-07 RX ADMIN — DULOXETINE HYDROCHLORIDE 60 MG: 60 CAPSULE, DELAYED RELEASE ORAL at 14:20

## 2022-07-07 RX ADMIN — METOPROLOL TARTRATE 50 MG: 50 TABLET, FILM COATED ORAL at 14:20

## 2022-07-07 RX ADMIN — SODIUM CHLORIDE, PRESERVATIVE FREE 10 ML: 5 INJECTION INTRAVENOUS at 11:09

## 2022-07-07 RX ADMIN — LAMOTRIGINE 50 MG: 25 TABLET ORAL at 21:30

## 2022-07-07 RX ADMIN — PROPAFENONE HYDROCHLORIDE 150 MG: 150 TABLET, FILM COATED ORAL at 21:22

## 2022-07-07 RX ADMIN — IPRATROPIUM BROMIDE AND ALBUTEROL SULFATE 1 AMPULE: 2.5; .5 SOLUTION RESPIRATORY (INHALATION) at 23:30

## 2022-07-07 RX ADMIN — PANTOPRAZOLE SODIUM 40 MG: 40 INJECTION, POWDER, LYOPHILIZED, FOR SOLUTION INTRAVENOUS at 21:21

## 2022-07-07 RX ADMIN — ACETYLCYSTEINE 600 MG: 200 INHALANT RESPIRATORY (INHALATION) at 19:43

## 2022-07-07 RX ADMIN — IPRATROPIUM BROMIDE AND ALBUTEROL SULFATE 1 AMPULE: 2.5; .5 SOLUTION RESPIRATORY (INHALATION) at 12:04

## 2022-07-07 RX ADMIN — VANCOMYCIN HYDROCHLORIDE 750 MG: 750 INJECTION, POWDER, LYOPHILIZED, FOR SOLUTION INTRAVENOUS at 12:26

## 2022-07-07 RX ADMIN — MEROPENEM 1000 MG: 1 INJECTION, POWDER, FOR SOLUTION INTRAVENOUS at 05:04

## 2022-07-07 RX ADMIN — CARBIDOPA AND LEVODOPA 1 TABLET: 25; 100 TABLET ORAL at 17:53

## 2022-07-07 NOTE — PROGRESS NOTES
[id number] [facility]  SPEECH PATHOLOGY  Fiberoptic Endoscopic Evaluation of Swallowing  (FEES)    RECOMMENDATIONS: I recommend the pt receive oral intake of nectar thick liquids and puree solids with medications crushed in purees as able. Ice chips OK after oral care.      Bridgette Hernandez  : 1940 (80 y.o.)   MRN: [id number]  ROOM: [address] [place] [address]  ADMISSION DATE: 2022  PATIENT DIAGNOSIS(ES): Shortness of breath [R06.02]  Septicemia ([address] [A41.9]  Chronic anemia [D64.9]  Acute respiratory failure with hypoxia (HCC) [J96.01]  Aspiration pneumonia of left lower lobe, unspecified aspiration pneumonia type ([address] [J69.0]  Chronic kidney disease, unspecified CKD stage [N18.9]  Chief Complaint   Patient presents with    Shortness of Breath     Patient Active Problem List    Diagnosis Date Noted    Mucus plugging of bronchi     Pulmonary congestion     Thrush 2022    MRSA colonization 2022    BETSY (obstructive sleep apnea)     Metabolic acidosis     Chronic anemia     Mild protein-calorie malnutrition ([address] 2022    Aspiration pneumonia of left lower lobe ([address]     Sensorineural hearing loss, bilateral 2022    Laceration of left lower extremity     Pulmonary nodules     C. difficile colitis 10/07/2021    Chest pain 2021    Pulmonary HTN ([address]     Mitral valve insufficiency     Sialadenitis     Colonic pseudoobstruction     Acute kidney injury superimposed on CKD ([address]     Chronic kidney disease 2020    Partial symptomatic epilepsy with complex partial seizures, not intractable, without status epilepticus ([address]     Diastolic congestive heart failure ([address] 2019    COPD (chronic obstructive pulmonary disease) ([address] 12/10/2018    PAF (paroxysmal atrial fibrillation) ([address]     Septicemia ([address] 2018    Severe obstructive sleep apnea     Hepatic steatosis     Diverticulosis     Blind left eye     Parkinson disease ([facility] 05/22/2018    CAD (coronary artery disease)     Morbid obesity with BMI of 40.0-44.9, adult ([facility] 02/10/2017    Type 2 diabetes mellitus with stage 4 chronic kidney disease, without long-term current use of insulin ([facility] 02/10/2017    Moderate persistent asthma 08/09/2016    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-sided sciatica 08/09/2016    Hypothyroidism 01/27/2015    Postural dizziness 02/20/2013    Anemia of chronic disease     GERD (gastroesophageal reflux disease)     Essential hypertension     Hyperlipidemia     Restless leg syndrome     L carotid artery stenosis s/p CEA 01/01/2002     Past Medical History:   Diagnosis Date    Anemia, unspecified 2010    neg bone marrow    Asthma     Atrial fibrillation ([facility]     Baker's cyst     Basal cell carcinoma of left cheek 3/12/2019    Blind left eye     Carotid artery stenoses     Carotid stenosis 2002    left    Cervical spinal cord compression ([facility] 11/2010    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-sided sciatica 8/9/2016    CKD (chronic kidney disease) stage 3, GFR 30-59 ml/min ([facility])     Community acquired pneumonia of left lower lobe of lung 9/8/2018    CRI     Detached retina, left     Diverticulosis     DJD (degenerative joint disease)     Edema     chronic    Fatty liver     GERD (gastroesophageal reflux disease)     Hearing loss     Herniated lumbar intervertebral disc     Hx stage 2 sacral decubitus ulcer 4/13/2018    Hyperlipidemia     Hypertension     Hypothyroid 1/27/2015    Morbid obesity with BMI of 40.0-44.9, adult ([facility] 2/10/2017    BETSY treated with BiPAP     Parkinson's disease ([facility]     Partial symptomatic epilepsy with complex partial seizures, not intractable, without status epilepticus ([facility] 1/24/2020    Restless leg syndrome     Restless legs syndrome     Rheumatic fever 1950    2x IN [place]    Sleep apnea     Tremor, essential 8/16/2010    Type 2 diabetes mellitus without complication [facility]     Type II or unspecified type diabetes mellitus without mention of complication, not stated as uncontrolled      Past Surgical History:   Procedure Laterality Date    APPENDECTOMY      CAROTID ENDARTERECTOMY      left    CATARACT REMOVAL      CERVICAL FUSION  11/2010    CHOLECYSTECTOMY      COLONOSCOPY N/A 3/8/2020    COLONOSCOPY FLEXIBLE W/ DECOMPRESSION performed by Malina Paredes MD at [address] (CERVIX STATUS UNKNOWN)      IR MIDLINE CATH  10/8/2021    IR MIDLINE CATH 10/8/2021 [facility] SPECIAL PROCEDURES    JOINT REPLACEMENT Bilateral 2003    KNEE SURGERY      replacement x 2    OVARY REMOVAL      SHOULDER SURGERY      TONSILLECTOMY      TUBAL LIGATION      UPPER GASTROINTESTINAL ENDOSCOPY  03/17/2020    Esophagitis    UPPER GASTROINTESTINAL ENDOSCOPY N/A 3/17/2020    EGD performed by Katelyn Demarco MD at [address] [facility] VITRECTOMY       Allergies   Allergen Reactions    Levaquin [Levofloxacin]      Pt was given in er and developed hives and redness    Metoclopramide     Phenazopyridine     Pyridium [Phenazopyridine Hcl]     Reglan [Metoclopramide Hcl]     Reglan [Metoclopramide Hcl]      tremors       DATE ONSET: 06/17/2022    Date of Evaluation: 7/7/2022   Evaluating Therapist: JAZZ Robles  EXAM LOCATION: Pt seen in room at bedside. CASE HISTORY: Medical record review/interview: Per MD H&P: \"The patient is a 78 y.o. female Hx of Parkinson's disease, Afib on Eliquis, BETSY, CKD, lives at home with , who presents with SOB and cough.      Pt reports cough and SOB and throat pain and odynophagia and dysphagia coughing with every meal. Her cough is deep and rhonchorous but she is struggling to bring up any secretions. She is not sure about fever.      She was initially admitted to [facility] with concerns for aspiration PNA and new hypoxia. ENDOSCOPIC EXAM: Endoscope was passed through the left nare with no complications and no bleeding. TESTING POSITION: Pt seated upright in bed at 90*    ORAL MOTOR FUNCTION: WFL    BASE OF TONGUE RANGE OF MOTION: WFL    LATERAL PHARYNGEAL WALL RANGE OF MOTION: Reduced pharyngeal constriction noted throughout with left affected greater than right     VELOPHARYNGEAL FUNCTION: WFL    TRUE VOCAL FOLDS: movement bilaterally with slight glottal gap noted but appears improved from prior FEES    FALSE VOCAL FOLDS: slight edema on left false vocal fold    ARYTENOID CARTILAGE: upon adduction left arytenoid and right arytenoid do no meet in parallel fashion with left arytenoid bowing downward in comparison. Mild edema of left arytenoid noted    ARYEPIGLOTTIC FOLDS: mild edema noted with left aryepiglottic fold     INTERARYTENOID SPACE: Posterior commissure hypertrophy, otherwise normal, hypertrophy subjectively reduced     BASELINE SECRETIONS: Clear secretions noted throughout pharynx. No white patches in comparison to prior study      EPIGLOTTIC RIM RESTING POSITION: resting on base of tongue    VOCAL PITCH: reduced    VOCAL INTENSITY: reduced    VOCAL QUALITY: mild dysphonia    RESPIRATORY SUPPORT FOR PHONATION: reduced    SUSTAINED PHONATION: ALBINO    COUGH ABILITY (VOLITIONAL / SPONTANEOUS): Strong-perceptually, wet and non-productive    SALIVA SWALLOW ABILITY (VOLITIONAL / SPONTANEOUS): WFL    THROAT CLEAR ON CUE: WFL    THROAT DISCOMFORT: None reported    SPEECH INTELLIGIBILITY: 75-80% intelligible which is primarily hindered by dysphonia    NASAL EMISSION: WFL      PO TRIALS    THIN LIQUID (ICE CHIPS/TSP/CUP/STRAW):  Ice Chips: premature spillage to valleculae with double swallow needed for clearance. Residue in valleculae post swallow likely secondary to reduced lingual base of motion (left greater than right). No penetration or aspiration.   Tsp: premature spillage to valleculae with indep double swallow needed for clearance. Deep penetration during the swallow. Cup: Premature spillage to valleculae, residue in in valleculae and pyriforms post swallow likely 2/2 reduced lingual base retraction and reduced hyolaryngeal excursion, respectively. No penetration or aspiration  Straw: premature spillage to valleculae and pyriform sinuses (left greater than right) with aspiration during the swallow that does clear despite reflexive cough. PUREE FOOD TSP: premature spillage to valleculae and incomplete pharyngeal constriction resulting in post-swallow diffuse pharyngeal residue that required cuing to clear. No penetration or aspiration. NECTAR THICK LIQUID (TSP/CUP/STRAW):  Tsp: WFL with no penetration or aspiration. Slight post-swallow residue in valleculae with no penetration aspiration   Cup: Premature spillage to pyriforms, double swallow for clearance of mild pharyngeal retention. No penetration or aspiration.     BACKFLOW OF SECRETIONS: no bolus color backflow noted    COMPENSATORY STRATEGIES / POSTURAL CHANGES TRIALED: Upright posture, small bites and sips, double swallow, slow rate of intake, volitional throat clear post swallows      PENETRATION-ASPIRATION SCALE (PAS)  [] 8 Material enters the airway, passes below the vocal folds, and no effort is made to eject  [x] 7 Material enters the airway, passes below the vocal folds, and is not ejected from the trachea despite effort  [] 6 Material enters the airway, passes below the vocal folds, and is ejected into the larynx or out of the airway  [] 5 Material enters the airway, contacts the vocal folds, and is not ejected into the airway  [] 4 Material enters the airway, contacts the vocal folds, and is ejected from the airway  [] 3 Material enters the airway, remains above the vocal folds, and is not ejected from airway  [] 2 Material enters the airway, remains above the vocal folds, and is ejected from airway  [] 1 Material does not enter the #[id number]  Speech-Language Pathologist  Phone: [phone], [phone]

## 2022-07-07 NOTE — H&P
Fiberoptic bronchoscopy history:     Nursing History and Physical reviewed and agreed upon: For additional findings, please see my notes in EPIC today    Patient is allergic to levaquin [levofloxacin], metoclopramide, phenazopyridine, pyridium [phenazopyridine hcl], reglan [metoclopramide hcl], and reglan [metoclopramide hcl].     Past Medical History:   Diagnosis Date    Anemia, unspecified 2010    neg bone marrow    Asthma     Atrial fibrillation ([address]     Baker's cyst     Basal cell carcinoma of left cheek 3/12/2019    Blind left eye     Carotid artery stenoses     Carotid stenosis 2002    left    Cervical spinal cord compression ([address] 11/2010    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-sided sciatica 8/9/2016    CKD (chronic kidney disease) stage 3, GFR 30-59 ml/min ([facility])     Community acquired pneumonia of left lower lobe of lung 9/8/2018    CRI     Detached retina, left     Diverticulosis     DJD (degenerative joint disease)     Edema     chronic    Fatty liver     GERD (gastroesophageal reflux disease)     Hearing loss     Herniated lumbar intervertebral disc     Hx stage 2 sacral decubitus ulcer 4/13/2018    Hyperlipidemia     Hypertension     Hypothyroid 1/27/2015    Morbid obesity with BMI of 40.0-44.9, adult ([address] 2/10/2017    BETSY treated with BiPAP     Parkinson's disease ([address]     Partial symptomatic epilepsy with complex partial seizures, not intractable, without status epilepticus ([address] 1/24/2020    Restless leg syndrome     Restless legs syndrome     Rheumatic fever 1950    2x IN [place]    Sleep apnea     Tremor, essential 8/16/2010    Type 2 diabetes mellitus without complication ([facility])     Type II or unspecified type diabetes mellitus without mention of complication, not stated as uncontrolled        Past Surgical History:   Procedure Laterality Date    APPENDECTOMY      CAROTID ENDARTERECTOMY      left    CATARACT REMOVAL      CERVICAL FUSION  11/2010    CHOLECYSTECTOMY      COLONOSCOPY N/A 3/8/2020    COLONOSCOPY FLEXIBLE W/ DECOMPRESSION performed by Tesha Magallon MD at [address]      HERNIA REPAIR      HYSTERECTOMY (CERVIX STATUS UNKNOWN)      IR MIDLINE CATH  10/8/2021    IR MIDLINE CATH 10/8/2021 [address] SPECIAL PROCEDURES    JOINT REPLACEMENT Bilateral 2003    KNEE SURGERY      replacement x 2    OVARY REMOVAL      SHOULDER SURGERY      TONSILLECTOMY      TUBAL LIGATION      UPPER GASTROINTESTINAL ENDOSCOPY  03/17/2020    Esophagitis    UPPER GASTROINTESTINAL ENDOSCOPY N/A 3/17/2020    EGD performed by Teodora Fuller MD at [address] [facility]         Allergies   Allergen Reactions    Levaquin [Levofloxacin]      Pt was given in er and developed hives and redness    Metoclopramide     Phenazopyridine     Pyridium [Phenazopyridine Hcl]     Reglan [Metoclopramide Hcl]     Reglan [Metoclopramide Hcl]      tremors       Current Facility-Administered Medications   Medication Dose Route Frequency Provider Last Rate Last Admin    potassium chloride 20 mEq/50 mL IVPB (Central Line)  20 mEq IntraVENous PRN Sebastian Quigley MD 50 mL/hr at 07/07/22 0809 20 mEq at 07/07/22 0809    magnesium sulfate 1000 mg in dextrose 5% 100 mL IVPB  1,000 mg IntraVENous PRN Sebastian Quigley MD        sodium phosphate 20.73 mmol in sodium chloride 0.9 % 250 mL IVPB  0.16 mmol/kg IntraVENous PRN Sebastian Quigley MD        Or    sodium phosphate 41.43 mmol in sodium chloride 0.9 % 500 mL IVPB  0.32 mmol/kg IntraVENous PRN Sebastian Quigley MD        vancomycin (VANCOCIN) 750 mg in dextrose 5 % 250 mL IVPB  750 mg IntraVENous Once Cresencio Rutherford  mL/hr at 07/07/22 1226 750 mg at 07/07/22 1226    fluconazole (DIFLUCAN) in 0.9 % sodium chloride IVPB 200 mg  200 mg IntraVENous Q24H Cresencio Rutherford  mL/hr at 07/07/22 1113 200 mg at 07/07/22 1113    sodium bicarbonate tablet 650 mg  650 mg Oral BID Isela Manriquez MD   650 mg at 07/06/22 0855    acetylcysteine (MUCOMYST) 20 % solution 600 mg  600 mg Inhalation BID Dinesh Padilla MD   600 mg at 07/07/22 0810    metoprolol tartrate (LOPRESSOR) tablet 50 mg  50 mg Oral BID Dinesh Padilla MD   50 mg at 07/06/22 0855    ipratropium-albuterol (DUONEB) nebulizer solution 1 ampule  1 ampule Inhalation Q4H While awake Jerrod Staples MD   1 ampule at 07/07/22 1204    morphine (PF) injection 1 mg  1 mg IntraVENous Q4H PRN Jerrod Staples MD   1 mg at 07/03/22 2212    meropenem (MERREM) 1,000 mg in sodium chloride 0.9 % 100 mL IVPB (mini-bag)  1,000 mg IntraVENous Q12H Jerrod Staples MD 33.3 mL/hr at 07/07/22 0504 1,000 mg at 07/07/22 0504    vancomycin (VANCOCIN) intermittent dosing (placeholder)   Other RX Placeholder Jerrod Staples MD        pantoprazole (PROTONIX) injection 40 mg  40 mg IntraVENous BID Gorge Trevizo MD   40 mg at 07/07/22 1108    carboxymethylcellulose PF (THERATEARS) 1 % ophthalmic gel 1 drop  1 drop Both Eyes 4x Daily PRN Gorge Trevizo MD        0.9 % sodium chloride infusion  25 mL IntraVENous PRN Gorge Trevizo MD        chlorhexidine (PERIDEX) 0.12 % solution 15 mL  15 mL Mouth/Throat BID Gorge Trevizo MD   15 mL at 07/06/22 0854    sodium chloride flush 0.9 % injection 10 mL  10 mL IntraVENous 2 times per day Jerrod Staples MD   10 mL at 07/07/22 1109    sodium chloride flush 0.9 % injection 10 mL  10 mL IntraVENous PRN Jerrod Staples MD        0.9 % sodium chloride infusion   IntraVENous PRN Jerrod Staples MD        promethazine (PHENERGAN) tablet 12.5 mg  12.5 mg Oral Q6H PRN Jerrod Staples MD        Or    ondansetron (ZOFRAN) injection 4 mg  4 mg IntraVENous Q6H PRN Jerrod Staples MD   4 mg at 07/01/22 0657    acetaminophen (TYLENOL) tablet 650 mg  650 mg Oral Q6H PRN Jerrod Staples MD   650 mg at 07/03/22 1105    Or    acetaminophen (TYLENOL) suppository 650 mg  650 mg Rectal Q6H PRN Fredi Real MD   650 mg at 07/02/22 0609    apixaban (ELIQUIS) tablet 2.5 mg  2.5 mg Oral BID Fredi Real MD   2.5 mg at 07/06/22 0855    atorvastatin (LIPITOR) tablet 40 mg  40 mg Oral Nightly Fredi Real MD   40 mg at 07/05/22 2113    carbidopa-levodopa (SINEMET)  MG per tablet 1 tablet  1 tablet Oral 4x Daily Fredi Real MD   1 tablet at 07/06/22 0855    DULoxetine (CYMBALTA) extended release capsule 60 mg  60 mg Oral Daily Fredi Real MD   60 mg at 07/06/22 0855    lamoTRIgine (LAMICTAL) tablet 50 mg  50 mg Oral BID Fredi Real MD   50 mg at 07/05/22 2112    montelukast (SINGULAIR) tablet 10 mg  10 mg Oral Daily Fredi Real MD   10 mg at 07/06/22 0855    propafenone (RYTHMOL) tablet 150 mg  150 mg Oral 3 times per day Fredi Real MD   150 mg at 07/06/22 0601    albuterol (PROVENTIL) nebulizer solution 2.5 mg  2.5 mg Nebulization Q4H PRN Edis Lance MD   2.5 mg at 06/18/22 1551       Medication list was reviewed and updated as needed in Epic     reports that she has quit smoking. She has never used smokeless tobacco.    family history includes Diabetes in her father, mother, and sister; Heart Disease in her father and mother. HENT: Airway patent and reviewed. Mallampati class IV  Cardiovascular: Normal rate, regular rhythm, normal heart sounds. Pulmonary/Chest: No wheezes. Bilateral rhonchi. No rales. Abdominal: Soft. Bowel sounds are normal. No distension. ASA CLASS    I. Normal, healthy adult    II. Mild systemic disease  X  III. Severe Systemic Disease    IV. Severe Systemic Disease which is a threat to life.     V. Moribund      Sedation plan:   No sedation   Minimal  X Moderate   Sedation per anesthesia   Continue ICU sedation      Post Procedure Plan   Return to same level of care   ______________________       The risks and benefits of procedure, alternatives to the procedure and doing nothing have been discussed with patient including complications (collapse lung, bleed, mechanical ventilation and cardiopulmonary arrest). The patient understands and agrees to proceed.

## 2022-07-07 NOTE — PROGRESS NOTES
RT Inhaler-Nebulizer Bronchodilator Protocol Note    There is a bronchodilator order in the chart from a provider indicating to follow the RT Bronchodilator Protocol and there is an Initiate RT Inhaler-Nebulizer Bronchodilator Protocol order as well (see protocol at bottom of note). CXR Findings:  No results found. The findings from the last RT Protocol Assessment were as follows:   History Pulmonary Disease: Chronic pulmonary disease  Respiratory Pattern: Mild dyspnea at rest, irregular pattern, or RR 21-25 bpm  Breath Sounds: Inspiratory and expiratory or bilateral wheezing and/or rhonchi  Cough: Weak, productive  Indication for Bronchodilator Therapy: On home bronchodilators  Bronchodilator Assessment Score: 14    Aerosolized bronchodilator medication orders have been revised according to the RT Inhaler-Nebulizer Bronchodilator Protocol below. Respiratory Therapist to perform RT Therapy Protocol Assessment initially then follow the protocol. Repeat RT Therapy Protocol Assessment PRN for score 0-3 or on second treatment, BID, and PRN for scores above 3. No Indications - adjust the frequency to every 6 hours PRN wheezing or bronchospasm, if no treatments needed after 48 hours then discontinue using Per Protocol order mode. If indication present, adjust the RT bronchodilator orders based on the Bronchodilator Assessment Score as indicated below. Use Inhaler orders unless patient has one or more of the following: on home nebulizer, not able to hold breath for 10 seconds, is not alert and oriented, cannot activate and use MDI correctly, or respiratory rate 25 breaths per minute or more, then use the equivalent nebulizer order(s) with same Frequency and PRN reasons based on the score. If a patient is on this medication at home then do not decrease Frequency below that used at home.     0-3 - enter or revise RT bronchodilator order(s) to equivalent RT Bronchodilator order with Frequency of every 4 hours PRN for wheezing or increased work of breathing using Per Protocol order mode. 4-6 - enter or revise RT Bronchodilator order(s) to two equivalent RT bronchodilator orders with one order with BID Frequency and one order with Frequency of every 4 hours PRN wheezing or increased work of breathing using Per Protocol order mode. 7-10 - enter or revise RT Bronchodilator order(s) to two equivalent RT bronchodilator orders with one order with TID Frequency and one order with Frequency of every 4 hours PRN wheezing or increased work of breathing using Per Protocol order mode. 11-13 - enter or revise RT Bronchodilator order(s) to one equivalent RT bronchodilator order with QID Frequency and an Albuterol order with Frequency of every 4 hours PRN wheezing or increased work of breathing using Per Protocol order mode. Greater than 13 - enter or revise RT Bronchodilator order(s) to one equivalent RT bronchodilator order with every 4 hours Frequency and an Albuterol order with Frequency of every 2 hours PRN wheezing or increased work of breathing using Per Protocol order mode.      Electronically signed by Wilbur Cuenca RCP on 7/6/2022 at 8:21 PM

## 2022-07-07 NOTE — PROGRESS NOTES
[facility] Infectious Disease Progress Note      Marco Olson     : 1940    DATE OF VISIT:  2022  DATE OF ADMISSION:  2022       Subjective:     Marco Olson is a 80 y.o. female whom I've been seeing for nosocomial aspiration pneumonia. Since I last saw her, she's probably doing about the same overall. Trying to be in good spirits, still very weak, sore / dry mouth, weak cough, some dyspnea with activity. Strictly NPO right now; repeat bronch later today? Seems to be tolerating Abx, no fever, no rash or pruritus, no vomiting or diarrhea. Ms. Sean Moscoso has a past medical history of Anemia, unspecified, Asthma, Atrial fibrillation ([address] Baker's cyst, Basal cell carcinoma of left cheek, Blind left eye, Carotid artery stenoses, Carotid stenosis, Cervical spinal cord compression (HCC), 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-sided sciatica, CKD (chronic kidney disease) stage 3, GFR 30-59 ml/min ([address] Community acquired pneumonia of left lower lobe of lung, CRI, Detached retina, left, Diverticulosis, DJD (degenerative joint disease), Edema, Fatty liver, GERD (gastroesophageal reflux disease), Hearing loss, Herniated lumbar intervertebral disc, Hx stage 2 sacral decubitus ulcer, Hyperlipidemia, Hypertension, Hypothyroid, Morbid obesity with BMI of 40.0-44.9, adult ([address] BETSY treated with BiPAP, Parkinson's disease ([address] Partial symptomatic epilepsy with complex partial seizures, not intractable, without status epilepticus ([address] Restless leg syndrome, Restless legs syndrome, Rheumatic fever, Sleep apnea, Tremor, essential, Type 2 diabetes mellitus without complication ([address] and Type II or unspecified type diabetes mellitus without mention of complication, not stated as uncontrolled.     Current Facility-Administered Medications: potassium chloride 20 mEq/50 mL IVPB (Central Line), 20 mEq, IntraVENous, PRN  magnesium sulfate 1000 mg in dextrose 5% 100 mL IVPB, 1,000 mg, IntraVENous, PRN  sodium phosphate 20.73 mmol in sodium chloride 0.9 % 250 mL IVPB, 0.16 mmol/kg, IntraVENous, PRN **OR** sodium phosphate 41.43 mmol in sodium chloride 0.9 % 500 mL IVPB, 0.32 mmol/kg, IntraVENous, PRN  vancomycin (VANCOCIN) 750 mg in dextrose 5 % 250 mL IVPB, 750 mg, IntraVENous, Once  furosemide (LASIX) injection 40 mg, 40 mg, IntraVENous, Once  fluconazole (DIFLUCAN) in 0.9 % sodium chloride IVPB 200 mg, 200 mg, IntraVENous, Q24H  sodium bicarbonate tablet 650 mg, 650 mg, Oral, BID  acetylcysteine (MUCOMYST) 20 % solution 600 mg, 600 mg, Inhalation, BID  metoprolol tartrate (LOPRESSOR) tablet 50 mg, 50 mg, Oral, BID  ipratropium-albuterol (DUONEB) nebulizer solution 1 ampule, 1 ampule, Inhalation, Q4H While awake  morphine (PF) injection 1 mg, 1 mg, IntraVENous, Q4H PRN  meropenem (MERREM) 1,000 mg in sodium chloride 0.9 % 100 mL IVPB (mini-bag), 1,000 mg, IntraVENous, Q12H  vancomycin (VANCOCIN) intermittent dosing (placeholder), , Other, RX Placeholder  pantoprazole (PROTONIX) injection 40 mg, 40 mg, IntraVENous, BID  carboxymethylcellulose PF (THERATEARS) 1 % ophthalmic gel 1 drop, 1 drop, Both Eyes, 4x Daily PRN  0.9 % sodium chloride infusion, 25 mL, IntraVENous, PRN  chlorhexidine (PERIDEX) 0.12 % solution 15 mL, 15 mL, Mouth/Throat, BID  sodium chloride flush 0.9 % injection 10 mL, 10 mL, IntraVENous, 2 times per day  sodium chloride flush 0.9 % injection 10 mL, 10 mL, IntraVENous, PRN  0.9 % sodium chloride infusion, , IntraVENous, PRN  promethazine (PHENERGAN) tablet 12.5 mg, 12.5 mg, Oral, Q6H PRN **OR** ondansetron (ZOFRAN) injection 4 mg, 4 mg, IntraVENous, Q6H PRN  acetaminophen (TYLENOL) tablet 650 mg, 650 mg, Oral, Q6H PRN **OR** acetaminophen (TYLENOL) suppository 650 mg, 650 mg, Rectal, Q6H PRN  apixaban (ELIQUIS) tablet 2.5 mg, 2.5 mg, Oral, BID  atorvastatin (LIPITOR) tablet 40 mg, 40 mg, Oral, Nightly  carbidopa-levodopa (SINEMET)  MG per tablet 1 tablet, 1 tablet, Oral, 4x Daily  DULoxetine (CYMBALTA) extended release capsule 60 mg, 60 mg, Oral, Daily  lamoTRIgine (LAMICTAL) tablet 50 mg, 50 mg, Oral, BID  montelukast (SINGULAIR) tablet 10 mg, 10 mg, Oral, Daily  propafenone (RYTHMOL) tablet 150 mg, 150 mg, Oral, 3 times per day  albuterol (PROVENTIL) nebulizer solution 2.5 mg, 2.5 mg, Nebulization, Q4H PRN     This is day 7 of vanco, meropenem and Diflucan. Allergies: Levaquin [levofloxacin], Metoclopramide, Phenazopyridine, Pyridium [phenazopyridine hcl], Reglan [metoclopramide hcl], and Reglan [metoclopramide hcl]    Pertinent items from the review of systems are discussed in the HPI; the remainder of the ROS was reviewed and is negative.      Objective:     Vital signs over the last 24 hours:  Temp  Av.4 °F (36.9 °C)  Min: 97.7 °F (36.5 °C)  Max: 99.1 °F (37.3 °C)  Pulse  Av.8  Min: 76  Max: 167  Systolic (94TYX), JYK:493 , Min:102 , PAU:589   Diastolic (62KQS), JZZ:01, Min:49, Max:143  Resp  Av.8  Min: 18  Max: 34  SpO2  Av.9 %  Min: 93 %  Max: 100 %    Constitutional:  well-developed, well-nourished, overweight, very weak, conversant  Psychiatric:  oriented to person, place and time; mood and affect appropriate for the situation   Eyes:  pupils equal, round and reactive to light; sclerae anicteric, conjunctivae not pale  ENT:  atraumatic; oral mucosa very dry, a bit red, no ulcers, but probably improving thrush and some adherent mucoid secretions  Resp:  Decreased at the bases, rhonchi BL, no dense signs of consolidation  Cardiovascular:  heart regular, no gallop, no murmur; moderate extremity edema; no IV phlebitis (LUE PICC in place)  GI:  abdomen soft, NT, ND, normal bowel sounds, no palpable masses or organomegaly  Musculoskeletal:  no clubbing, cyanosis or petechiae; extremities with no gross effusions, joint misalignment or acute arthritis  Skin: warm, dry, normal turgor; no rash, no ulcers   ______________________________    Recent Labs 07/07/22  0516 07/06/22  0446 07/05/22  0439   WBC 15.6* 14.7* 19.9*   HGB 9.6* 8.8* 9.3*   HCT 30.0* 27.6* 29.8*   MCV 85.9 88.8 89.9    296 246     Lab Results   Component Value Date    CREATININE 1.7 (H) 07/07/2022     Lab Results   Component Value Date    LABALBU 3.4 07/01/2022     Lab Results   Component Value Date    ALT <5 (L) 06/23/2022    AST 14 (L) 06/23/2022    ALKPHOS 66 06/23/2022    BILITOT <0.2 06/23/2022      Lab Results   Component Value Date    LABA1C 6.4 09/27/2021     Other recent pertinent labs: Anion gap back down to 15. Vancomycin level 21.6. ANC still up at 12.9k. 500 Eos.    ______________________________    Recent pertinent micro results: All BCx negative. MRSA swab (+). RCx with only Candida albicans.   ______________________________    Recent imaging results (last 7 days):     CT ABDOMEN PELVIS WO CONTRAST Additional Contrast? None    Result Date: 7/2/2022  1. Nonspecific, nonobstructive gaseous bowel pattern. 2. Extensive diverticulosis of the left colon, lacking acute inflammatory changes. 3. Bibasilar consolidations, atelectasis versus pneumonia. Pleural effusions have resolved since 06/23/2022. XR ABDOMEN (KUB) (SINGLE AP VIEW)    Result Date: 7/3/2022  1. Gaseous distention of the stomach, otherwise, unremarkable bowel gas pattern. CT CHEST WO CONTRAST    Result Date: 7/3/2022  1. Multifocal bronchopneumonia, with progression since 06/17/2022. 2. Small bilateral pleural effusions. 3. Narrowing of the AP diameter of the trachea. 4. Mild cardiomegaly. XR CHEST PORTABLE    Result Date: 7/3/2022  Airspace disease within the mid to lower lung zones is slightly increased as compared to prior. Possible small left pleural effusion. XR CHEST PORTABLE    Result Date: 7/1/2022  Stable chest.  Persistent left lower lobe opacification with probable effusion.      XR CHEST PORTABLE    Result Date: 7/1/2022  Persistent left lower lobe opacification and effusion. Interval clearing of perihilar edema and right basilar opacification and effusion.       Assessment:     Patient Active Problem List   Diagnosis Code    GERD (gastroesophageal reflux disease) K21.9    Essential hypertension I10    Hyperlipidemia E78.5    Restless leg syndrome G25.81    Anemia of chronic disease D63.8    Postural dizziness R42    Moderate persistent asthma J45.40    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-sided sciatica M54.41, G89.29    Morbid obesity with BMI of 40.0-44.9, adult ([facility]) E66.01, Z68.41    Type 2 diabetes mellitus with stage 4 chronic kidney disease, without long-term current use of insulin ([facility]) E11.22, N18.4    CAD (coronary artery disease) I25.10    Parkinson disease ([facility] G20    Severe obstructive sleep apnea G47.33    Hypothyroidism E03.9    Hepatic steatosis K76.0    Diverticulosis K57.90    Blind left eye H54.40    L carotid artery stenosis s/p CEA I65.29    Septicemia ([facility]) A41.9    PAF (paroxysmal atrial fibrillation) ([facility]) I48.0    COPD (chronic obstructive pulmonary disease) ([facility]) Q29.2    Diastolic congestive heart failure ([facility]) I50.30    Partial symptomatic epilepsy with complex partial seizures, not intractable, without status epilepticus ([facility]) G40.209    Chronic kidney disease N18.9    Acute kidney injury superimposed on CKD ([facility]) N17.9, N18.9    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    Sialadenitis K11.20    Chest pain R07.9    Pulmonary HTN ([facility]) I27.20    Mitral valve insufficiency I34.0    C. difficile colitis A04.72    Laceration of left lower extremity B21.861K    Pulmonary nodules R91.8    Sensorineural hearing loss, bilateral H90.3    Aspiration pneumonia of left lower lobe ([facility]) J69.0    Mild protein-calorie malnutrition ([facility]) E44.1    Chronic anemia D64.9    Thrush B37.0    MRSA colonization Z22.322    BETSY (obstructive sleep apnea) N26.70    Metabolic acidosis R02.8    Pulmonary congestion R09.89 Assessment of today's active condition(s):      --          Background including prior tobacco use, asthma, morbid obesity, Parkinson's disease, dysphagia.     --          Admission 2.5 weeks ago with aspiration pneumonitis versus pneumonia, with acute respiratory failure; expected leukocytosis, lactate and PCT not impressive, but given how sick she was, completely reasonable to treat with Abx for actual bacterial pneumonia.     --          Another episode of sepsis and hypoxemia late last week, this time with a higher WBC count and higher PCT level, this time not quite so sick from a pulmonary standpoint (able to be managed with BiPAP, not intubation), but again, given the lab changes and the severity of the acute illness, and the findings on CT scan, I think very reasonable to treat with antibiotics for presumed nosocomial bacterial pneumonia, not just a sterile pneumonitis. The vancomycin seems wise, given the MRSA colonization, and the meropenem to cover nosocomial GNRs empirically. Seemed to be making some slow, steady improvement earlier in the week, any stagnation now probably from a standpoint of weakness and poor clearance of secretions more than untreated infection, I think.      --          FARHEEN slowly resolving.     --          Thrush. Treatment recs:     Continue vancomycin-meropenem for aspiration pneumonia. Would plan on 8-10 days total.     Watch for Cdiff, IV site infection, Candidiasis, etc.     Diflucan for thrush (would probably continue that as long as she's on pneumonia antibiotics, or just a bit longer). Strict NPO for now, but I told her  he could occasionally moisten her mouth with water on the swabs that are in her room.      When I had her take a couple of deeper breaths to examine her today, that definitely made her cough -- not sure how much progress she can really make with an incentive spirometer, to try to improve any areas of atelectasis and encourage her to clear some secretions (as weak as she is), but I think it's worth a try. D/W her  at bedside.      Electronically signed by Rafal Campbell MD on 7/7/2022 at 10:03 AM.

## 2022-07-07 NOTE — PROGRESS NOTES
SLP Attempt Note        Name: Tierra Spaulding  : 1940  Medical Diagnosis: Shortness of breath [R06.02]  Septicemia ([facility] [A41.9]  Chronic anemia [D64.9]  Acute respiratory failure with hypoxia (HCC) [J96.01]  Aspiration pneumonia of left lower lobe, unspecified aspiration pneumonia type ([address] [J69.0]  Chronic kidney disease, unspecified CKD stage [N18.9]    Attempting to complete repeat FEES. Per Dr. Mayra Sanford note, pt remains congested despite NPO status. Plan to complete bronch today. Will re-attempt after bronch as able. No charges filed.  Thank you,    Emma Strickland M.A., [address] [facility]  Speech-Language Pathologist  Phone: [phone]

## 2022-07-07 NOTE — PROGRESS NOTES
Pulmonary & Critical Care Medicine ICU Progress Note    CC: shortness of breath     Events of Last 24 hours:   BiPAP overnight 16/8 60% FiO2   This am on 6 LPM   Failed bedside swallow - NPO now   Still sounding very congested hard to bring secretions up      Vascular lines: IV: 6/19/2022 PICC      Intake/Output Summary (Last 24 hours) at 7/7/2022 0911  Last data filed at 7/7/2022 0404  Gross per 24 hour   Intake 520 ml   Output 700 ml   Net -180 ml         MV: 6/18/2022 -6/27/22  Vent Mode: AC/VC Resp Rate (Set): 0 bmp/Vt (Set, mL): 0 mL/ /FiO2 : 50 %  No results for input(s): PHART, ECT7FYG, PO2ART in the last 72 hours. IV:   sodium chloride      sodium chloride         Vitals:  Blood pressure (!) 120/90, pulse 97, temperature 98.1 °F (36.7 °C), temperature source Bladder, resp. rate 22, height 5' 6\" (1.676 m), weight 285 lb 7.9 oz (129.5 kg), SpO2 97 %, not currently breastfeeding. On  6 L  Constitutional:  In no distress. Eyes: PERRL. Conjunctivae anicteric. ENT: Normal nose. Normal tongue. Neck:  Trachea is midline. No thyroid tenderness. Respiratory: Mild accessory muscle usage. Decreased breath sounds. No wheezes. No rales. ++ Bilateral rhonchi. Cardiovascular: Normal S1S2. No digit clubbing. No digit cyanosis. +1   LE edema. Gastrointestinal: No mass palpated. No tenderness palpated. No umbilical hernia. Skin: No rash on the exposed extremities. No Nodules or induration on exposed extremities. Psychiatric: No anxiety or Agitation. Alert and Oriented to person, place and time.       Scheduled Meds:   vancomycin  750 mg IntraVENous Once    sodium bicarbonate  650 mg Oral BID    fluconazole  200 mg Oral Daily    acetylcysteine  600 mg Inhalation BID    metoprolol tartrate  50 mg Oral BID    ipratropium-albuterol  1 ampule Inhalation Q4H While awake    meropenem  1,000 mg IntraVENous Q12H    vancomycin (VANCOCIN) intermittent dosing (placeholder)   Other RX Placeholder    pantoprazole  40 mg IntraVENous BID    chlorhexidine  15 mL Mouth/Throat BID    sodium chloride flush  10 mL IntraVENous 2 times per day    apixaban  2.5 mg Oral BID    atorvastatin  40 mg Oral Nightly    carbidopa-levodopa  1 tablet Oral 4x Daily    DULoxetine  60 mg Oral Daily    lamoTRIgine  50 mg Oral BID    montelukast  10 mg Oral Daily    propafenone  150 mg Oral 3 times per day       Data:  CBC:   Recent Labs     07/05/22  0439 07/06/22 0446 07/07/22  0516   WBC 19.9* 14.7* 15.6*   HGB 9.3* 8.8* 9.6*   HCT 29.8* 27.6* 30.0*   MCV 89.9 88.8 85.9    296 308     BMP:   Recent Labs     07/05/22 0439 07/06/22 0446 07/07/22  0516   * 142 144   K 3.9 4.2 3.4*    105 105   CO2 16* 18* 24   PHOS  --  4.3 4.2   BUN 40* 36* 32*   CREATININE 2.0* 2.0* 1.7*     LIVER PROFILE:   No results for input(s): AST, ALT, LIPASE, BILIDIR, BILITOT, ALKPHOS in the last 72 hours. Invalid input(s): AMYLASE,  ALB    Microbiology:  6/17/2022 SARS-CoV-2 and influenza are negative  6/17/2022 MRSA DNA screen positive  6/17/2022 blood NG  6/17/2022 sputum NRF  6/18/2022 tracheal aspirate NRF  6/19/2022 BAL Candida  6/19/2022 respiratory viral panel negative  7/1/22 Blood cx NGTD        Imaging:  CT chest 7/3/22  images reviewed by me and showed:   1. Multifocal bronchopneumonia, with progression since 06/17/2022.   2. Small bilateral pleural effusions  3. Narrowing of the AP diameter of the trachea  4.  Mild cardiomegaly        ASSESSMENT:  · Acute hypoxemic respiratory failure   · Aspiration pneumonia -recurrent  · Pulmonary congestion- not improving   · Was extubated and weaned to room air and home CPAP but aspirated again and returned to the ICU  · BETSY on BiPAP 22/15 with 1 L bleed in   · Metabolic acidosis likely due to FARHEEN  · Parkinson's disease  · Atrial fibrillation  · Acute on chronic kidney failure, baseline creatinine 1.6  · Acute on chronic anemia, s/p 2 U PRBC 6/23/2022  · H/O esophagitis  · Pleural effusions are small bilaterally      PLAN:  Bipap for respiratory distress  Supplemental oxygen to maintain SaO2 >92%; wean as tolerated   NT suctioning  Mucomyst 3ML 20% inhalation BID  Therapeutic bronchoscopy in a.m. today. The risks and benefits of Bronchoscopy , alternatives to the procedure and doing nothing have been discussed with patient including complications (collapse lung, bleed, infection, mechanical ventilation, hospitalization, cardiopulmonary arrest and death). We also discussed the risks of sedation/anesthesia. It is my assessment that the benefit of the invasive procedure outweighs  the risk. The patient understands and wishes to proceed  Vanc and Merrem D#7.  S/p 10 days cefepime & vancomycin  Fluconazole D#7 per IM  ID following  F/u cx  NPO per speech pathology   Nephrology following   Lopressor 50 BID   Blood sugar control ISS, with goal 150-180  DVT prophylaxis: Eliquis  DNI- Full code korey bronchoscopy today   Discussed with  at the bedside

## 2022-07-07 NOTE — PROGRESS NOTES
07/07/22 0308   NIV Type   NIV Started/Stopped On   Equipment Type v60   Mode Bilevel   Mask Type Full face mask   Mask Size Medium   Settings/Measurements   PIP Observed 18 cm H20   IPAP 16 cmH20   CPAP/EPAP 8 cmH2O   Rate Ordered 16   Resp 19   Insp Rise Time (%) 2 %   FiO2  50 %   I Time/ I Time % 0.9 s   Vt Exhaled 492 mL   Minute Volume 13.1 Liters   Mask Leak (lpm) 11 lpm   Comfort Level Good   Using Accessory Muscles No   SpO2 99   Patient's Home Machine No   Oxygen Therapy/Pulse Ox   O2 Therapy Oxygen   O2 Device PAP (positive airway pressure)   Heart Rate 95   SpO2 98 %

## 2022-07-07 NOTE — PLAN OF CARE
Monitoring patient skin integrity for skin breakdown, turning and repositioning q2h per protocol. Falling star program remains in place. Call light and personal belongings within reach. Frequent visual monitoring continues. Toileting program inplace. Patient assisted in turning/repositioning at least once every 2 hours, and on a prn basis.

## 2022-07-07 NOTE — PROGRESS NOTES
08:20 Pt awake a&o x4, VSS , lots of rhonci t/o both lungs, pt is very weak and has a very weak cough.  Assessment as charted  08:50 Critical care rounds completed at bedside w/ Dr. Greg Kennedy MD plans to Bronch pt today   11:26 Pt remains awake a&o x4, VS remain stable reassessment unchanged and as charted   11:40 Greg Carrera @ bedside preforming Bronch w/ Endo team  13:38 Speech at bedside  preforming FEES, per Speech and Dr. Enmanuel Santacruz , pt ok to have pureed diet , nectar thick liquids   14:56 Pt remains awake a&o x4, very minimal rhonchi post bronch , reassessment otherwise unchanged and as charted  19:00 Bedside handoff given to night nurse, pt stable @ handoff on HFNC

## 2022-07-07 NOTE — FLOWSHEET NOTE
07/06/22 2003   Vital Signs   Temp 97.9 °F (36.6 °C)   Temp Source Bladder   Heart Rate 86   Heart Rate Source Monitor   Resp 23   BP (!) 102/49   BP Location Right lower arm   BP Method Automatic   MAP (Calculated) 66.67   Patient Position High fowlers   Level of Consciousness Alert (0)   MEWS Score 2   Pain Assessment   Pain Assessment None - Denies Pain   Pain Level 0   Care Plan - Pain Goals   Verbalizes/displays adequate comfort level or baseline comfort level Notify Licensed Independent Practitioner if interventions unsuccessful or patient reports new pain; Implement non-pharmacological measures as appropriate and evaluate response;Administer analgesics based on type and severity of pain and evaluate response;Assess pain using appropriate pain scale   Oxygen Therapy   SpO2 93 %   O2 Device High flow nasal cannula   O2 Flow Rate (L/min) 6 L/min   pt resting in bed, meds given per MAR. Mouth care and oral suctioning done at this time, pt clean dry and repositioned at this time. Pt denying any other needs.  Call light within reach, bed in lowest. Jamie Triplett RN

## 2022-07-07 NOTE — PROGRESS NOTES
Hospitalist PCU Progress Note      PCP: Lucas Max MD    Date of Admission: 6/17/2022    Hospital Course:     admitted for resp failure from CHF , pna, and unable to manage own secretions with advanced parkinson's, eventually intubated    Prolonged vent support - there were discussions about trach and PEG support, though able to extubate eventually. 6/28-patient was extubated at 1:57 PM on 6/27/2022. She was then placed on BiPAP. She is getting periodic breaks from BiPAP. This morning she is on BiPAP and able to follow commands. O2 daytime and BiPAP HS. Secretions remain a major problem. Resp culture (BAL) growing candida - started antifungal.       Repeat emesis 7/1 and spiking high fever and worsening O2 requirement - she was transferred back to ICU on 7/1 overnight. Subjective:     MS Herman Marie seen awake, alert and better oriented.  S.p bronch with clearance of mucous plugs today   For FEES by SLP team later today       Remains on 6 L o2 , poor cough response noted        Medications:  Reviewed    Infusion Medications    sodium chloride      sodium chloride       Scheduled Medications    fluconazole  200 mg IntraVENous Q24H    sodium bicarbonate  650 mg Oral BID    acetylcysteine  600 mg Inhalation BID    metoprolol tartrate  50 mg Oral BID    ipratropium-albuterol  1 ampule Inhalation Q4H While awake    meropenem  1,000 mg IntraVENous Q12H    vancomycin (VANCOCIN) intermittent dosing (placeholder)   Other RX Placeholder    pantoprazole  40 mg IntraVENous BID    chlorhexidine  15 mL Mouth/Throat BID    sodium chloride flush  10 mL IntraVENous 2 times per day    apixaban  2.5 mg Oral BID    atorvastatin  40 mg Oral Nightly    carbidopa-levodopa  1 tablet Oral 4x Daily    DULoxetine  60 mg Oral Daily    lamoTRIgine  50 mg Oral BID    montelukast  10 mg Oral Daily    propafenone  150 mg Oral 3 times per day     PRN Meds: potassium chloride, magnesium sulfate, sodium phosphate IVPB **OR** sodium phosphate IVPB, morphine, carboxymethylcellulose PF, sodium chloride, sodium chloride flush, sodium chloride, promethazine **OR** ondansetron, acetaminophen **OR** acetaminophen, albuterol      Intake/Output Summary (Last 24 hours) at 7/7/2022 1400  Last data filed at 7/7/2022 1200  Gross per 24 hour   Intake --   Output 1330 ml   Net -1330 ml       Physical Exam Performed:    BP (!) 141/81   Pulse (!) 106   Temp 98.1 °F (36.7 °C) (Bladder)   Resp 24   Ht 5' 6\" (1.676 m)   Wt 285 lb 7.9 oz (129.5 kg)   SpO2 97%   BMI 46.08 kg/m²            General: elderly female up in bed, more Awake, alert and better oriented. Appears to be not in any distress  Mucous Membranes:  Pink , anicteric  Left opaque cornea  Neck: No JVD, no carotid bruit, no thyromegaly  Chest: diminished wesly with poor inspiratory effort and cough response   Cardiovascular:  RRR S1S2 heard, no murmurs or gallops  Abdomen:  Soft, obese  undistended, non tender, no organomegaly, BS present  Extremities: diffuse edema of right UE 3+   Left UE picc noted  No edema or cyanosis. Distal pulses well felt  Neurological : grossly normal with gen weakness         Labs:   Recent Labs     07/05/22  0439 07/06/22  0446 07/07/22  0516   WBC 19.9* 14.7* 15.6*   HGB 9.3* 8.8* 9.6*   HCT 29.8* 27.6* 30.0*    296 308     Recent Labs     07/05/22  0439 07/06/22  0446 07/07/22  0516   * 142 144   K 3.9 4.2 3.4*    105 105   CO2 16* 18* 24   BUN 40* 36* 32*   CREATININE 2.0* 2.0* 1.7*   CALCIUM 8.7 8.6 9.4   PHOS  --  4.3 4.2     No results for input(s): AST, ALT, BILIDIR, BILITOT, ALKPHOS in the last 72 hours. No results for input(s): INR in the last 72 hours. No results for input(s): [employer] [place] in the last 72 hours.     Urinalysis:      Lab Results   Component Value Date/Time    NITRU Negative 07/01/2022 10:58 PM    WBCUA 0-2 07/01/2022 10:58 PM    BACTERIA 2+ 06/22/2022 04:10 PM    RBCUA 3-4 07/01/2022 10:58 PM    BLOODU TRACE-LYSED 07/01/2022 10:58 PM    SPECGRAV 1.010 07/01/2022 10:58 PM    GLUCOSEU Negative 07/01/2022 10:58 PM       Radiology:  CT CHEST WO CONTRAST   Final Result   1. Multifocal bronchopneumonia, with progression since 06/17/2022.   2. Small bilateral pleural effusions. 3. Narrowing of the AP diameter of the trachea. 4. Mild cardiomegaly. XR CHEST PORTABLE   Final Result   Airspace disease within the mid to lower lung zones is slightly increased as   compared to prior. Possible small left pleural effusion. CT ABDOMEN PELVIS WO CONTRAST Additional Contrast? None   Final Result   1. Nonspecific, nonobstructive gaseous bowel pattern. 2. Extensive diverticulosis of the left colon, lacking acute inflammatory   changes. 3. Bibasilar consolidations, atelectasis versus pneumonia. Pleural effusions   have resolved since 06/23/2022. XR ABDOMEN (KUB) (SINGLE AP VIEW)   Final Result   1. Gaseous distention of the stomach, otherwise, unremarkable bowel gas   pattern. XR CHEST PORTABLE   Final Result   Stable chest.  Persistent left lower lobe opacification with probable   effusion. XR CHEST PORTABLE   Final Result   Persistent left lower lobe opacification and effusion. Interval clearing of   perihilar edema and right basilar opacification and effusion. XR CHEST PORTABLE   Final Result   No significant interval change in bilateral airspace disease and small   effusions. Unchanged positioning of support devices. XR CHEST PORTABLE   Final Result   Increasing basilar opacities with probable small effusions. Unchanged positioning of support devices. XR CHEST PORTABLE   Final Result   1. No significant change. XR CHEST PORTABLE   Final Result   No significant interval change in basilar predominant airspace disease and   small left greater than right pleural effusions. Pulmonary vascular   congestion.          CT ABDOMEN atelectasis. The left does have atelectatic changes but could also   have some patchy opacities from pneumonia or aspiration at the left base. 2.  There is no pleural effusion or pneumothorax. 3.  Narrowing of the AP diameter of the trachea with bulging of the   membranous portion due to the gas distended but nondilated esophagus. XR CHEST PORTABLE   Final Result   1. Bibasilar heterogeneous opacities may represent subsegmental atelectasis   and/or mild pulmonary edema. Underlying infection is difficult to exclude. Short-term follow-up PA and lateral chest radiographs may be helpful for   further evaluation. 2.  Cardiomegaly. Assessment/Plan:    Principal Problem:    Septicemia ([address]  Active Problems:    Aspiration pneumonia of left lower lobe (HCC)    Mild protein-calorie malnutrition (HCC)    Chronic anemia    Thrush    MRSA colonization    BETSY (obstructive sleep apnea)    Metabolic acidosis    Pulmonary congestion    Mucus plugging of bronchi    Parkinson disease (HCC)    Chronic kidney disease    Acute kidney injury superimposed on CKD ([address]  Resolved Problems:    Shortness of breath    Acute respiratory failure with hypoxia (HCC)           Acute Hypox Resp Failure   Aspiration PNA   Treated with  IV Zosyn and vancomycin. Changed Zosyn to cefepime. DCd vancomycin 6/22. Finished 10 days of Vanco and cefepime  Intubated 6/18. Extubated on 6/27/2022.  -On BiPAP. Alternate BiPAP with nasal cannula oxygen.    - SLP involved. - resp culture from BAL is growing candida - started diflucan on 7/1/2022   - spiking high fever 7/1 - back on Abx with Vanc and merrem IV.    - now on 6 L o2 , weaning as able               - poor physical condition limiting clearance of resp secretions- sp repeat bronch with clearing of mucous today (7/7/22)        Sepsis POA - due to above. Recurrent with aspiration  Improving with abx        Dysphagia.    Abnormal CT  Cough with every meal intake per family report. Underlying Parkinson's. I ordered for MBS and esophagram, though these will unlikely be done till she is better clinically. Failing bedside SLP   - continues to struggle with secretions. - back to NPO status.    - CT abd pelvis 7/2 - actually fairly unremarkable. - Consider placing NGT tomorrow for tube feeds if still does not pass SLP      Melena  GI bleed   drop in H/H   GI consulted   H&H held stable. Has since resumed eliquis  IV PPI BID        FARHEEN on CKD IIIB  Likely secondary to ATN in the setting of pneumonia, hypotension and elevated vancomycin levels  Had lasix gtt earlier this admission. Does not appear fluid overloaded now and with no PO intake - off diuretics. Will need IVF - reassess closely.        Parkinson's disease  on Sinemet.         Afib Paroxysmal   Rythmol   Resumed Eliquis.        HTN - BP on the low side. Nifedipine stopped due to this and also leg edema. Consider alternative when restarting.      DVT Prophylaxis: Eliquis  eliquis held for bloody secretions seen during bronch and melena - and has since been resumed. Diet: Diet NPO Exceptions are: Ice Chips  Code Status: Limited    Cc. Prognosis guarded. Updated  at bedside  Noted limited code now - DNI.        Jo Ann Oconnor MD 7/7/2022 2:00 PM

## 2022-07-07 NOTE — PROGRESS NOTES
Vancomycin Day # 7 of 14  Current dose = Pulse dosing per daily vancomycin levels.   BUN/SRCR 32/1.7      Est CrCl = 37 ml/min  WBC   15.6            Tmax 98.1  Vancomycin random level this am = 21.6 mcg/ml  Will give 750 mg vancomycin x1 dose @ 1200 today and recheck random level tomorrow am.  Pharmacy will continue to obtain daily random vancomycin levels and redose as appropriate

## 2022-07-07 NOTE — PROGRESS NOTES
07/06/22 2346   NIV Type   Skin Protection for O2 Device Yes   Location Nose   NIV Started/Stopped On   Equipment Type v60   Mode Bilevel   Mask Type Full face mask   Mask Size Medium   Settings/Measurements   IPAP 16 cmH20   CPAP/EPAP 8 cmH2O   Rate Ordered 16   Resp (!) 34   FiO2  50 %   I Time/ I Time % 0.9 s   Vt Exhaled 522 mL   Minute Volume 14 Liters   Mask Leak (lpm) 0 lpm   Comfort Level Good   Using Accessory Muscles No   SpO2 95   Alarm Settings   Alarms On Y   Low Pressure 8 cmH2O   High Pressure  35 cmH2O   Delay Alarm 20 sec(s)   RR Low  14   RR High 55 br/min   Oxygen Therapy/Pulse Ox   Heart Rate 88   SpO2 98 %

## 2022-07-07 NOTE — PROGRESS NOTES
RT Inhaler-Nebulizer Bronchodilator Protocol Note    There is a bronchodilator order in the chart from a provider indicating to follow the RT Bronchodilator Protocol and there is an Initiate RT Inhaler-Nebulizer Bronchodilator Protocol order as well (see protocol at bottom of note). CXR Findings:  No results found. The findings from the last RT Protocol Assessment were as follows:   History Pulmonary Disease: (P) Chronic pulmonary disease  Respiratory Pattern: (P) Mild dyspnea at rest, irregular pattern, or RR 21-25 bpm  Breath Sounds: (P) Intermittent or unilateral wheezes  Cough: (P) Weak, productive  Indication for Bronchodilator Therapy: (P) On home bronchodilators  Bronchodilator Assessment Score: (P) 12    Aerosolized bronchodilator medication orders have been revised according to the RT Inhaler-Nebulizer Bronchodilator Protocol below. Respiratory Therapist to perform RT Therapy Protocol Assessment initially then follow the protocol. Repeat RT Therapy Protocol Assessment PRN for score 0-3 or on second treatment, BID, and PRN for scores above 3. No Indications - adjust the frequency to every 6 hours PRN wheezing or bronchospasm, if no treatments needed after 48 hours then discontinue using Per Protocol order mode. If indication present, adjust the RT bronchodilator orders based on the Bronchodilator Assessment Score as indicated below. Use Inhaler orders unless patient has one or more of the following: on home nebulizer, not able to hold breath for 10 seconds, is not alert and oriented, cannot activate and use MDI correctly, or respiratory rate 25 breaths per minute or more, then use the equivalent nebulizer order(s) with same Frequency and PRN reasons based on the score. If a patient is on this medication at home then do not decrease Frequency below that used at home.     0-3 - enter or revise RT bronchodilator order(s) to equivalent RT Bronchodilator order with Frequency of every 4 hours PRN for wheezing or increased work of breathing using Per Protocol order mode. 4-6 - enter or revise RT Bronchodilator order(s) to two equivalent RT bronchodilator orders with one order with BID Frequency and one order with Frequency of every 4 hours PRN wheezing or increased work of breathing using Per Protocol order mode. 7-10 - enter or revise RT Bronchodilator order(s) to two equivalent RT bronchodilator orders with one order with TID Frequency and one order with Frequency of every 4 hours PRN wheezing or increased work of breathing using Per Protocol order mode. 11-13 - enter or revise RT Bronchodilator order(s) to one equivalent RT bronchodilator order with QID Frequency and an Albuterol order with Frequency of every 4 hours PRN wheezing or increased work of breathing using Per Protocol order mode. Greater than 13 - enter or revise RT Bronchodilator order(s) to one equivalent RT bronchodilator order with every 4 hours Frequency and an Albuterol order with Frequency of every 2 hours PRN wheezing or increased work of breathing using Per Protocol order mode. RT to enter RT Home Evaluation for COPD & MDI Assessment order using Per Protocol order mode.     Electronically signed by Nikhil Navas RCP on 7/7/2022 at 12:18 PM

## 2022-07-07 NOTE — PROGRESS NOTES
Patient resting in bed, bedside report given to [facility]. Care of patient transferred at this time.

## 2022-07-07 NOTE — PROCEDURES
PROCEDURE: Therapeutic/diagnostic BRONCHOSCOPY WITH BRONCHOALVEOLAR LAVAGE       The risks and benefits as well as alternatives to the procedure have been discussed with the patient and or family. The patient and or next of kin understands and agrees to proceed. DESCRIPTION OF PROCEDURE: A time out was taken. Type of sedation used: Moderate Sedation:   Conscious sedation with 1.5 mg versed and 37.5 mcg fentanyl. Patient was monitored continuously 1:1 throughout the entire procedure while sedation was administered    The scope was passed with ease via the mouth. A complete airway inspection was performed. Evidence of tracheobronchomalacia. No endobronchial lesions were identified. There were thick, purulent secretions in multiple areas, above vocal cords, trachea, bilateral tracheobronchial tree, worse on the left side. Friable edematous left sided airways. Multiple saline injections followed by therapeutic aspiration was performed. Washings were obtained throughout the airways. A Bronchoalveolar lavage was obtained from the left upper lobe with good return. The patient tolerated the procedure well. Estimated blood loss was less than 5 ml. Recovery will be per endoscopy protocol. FOLLOW UP:  Cultures and cytology in  three to five days.

## 2022-07-07 NOTE — PROGRESS NOTES
[facility] Progress Note           Subjective/   80y.o. year old female who we are seeing in consultation for FARHEEN/CKD. HPI:  Bronchoscopy and swallow evaluation planned for today    Last 24-hour urine output charted at 850 mL    ROS: No fever or chills. Social: Family at bedside.     Objective/   GEN:  Chronically ill, BP (!) 144/90   Pulse (!) 107   Temp 98.4 °F (36.9 °C) (Bladder)   Resp 27   Ht 5' 6\" (1.676 m)   Wt 285 lb 7.9 oz (129.5 kg)   SpO2 96%   BMI 46.08 kg/m²    From 7/4  HEENT: non-icteric, no JVD  CV: S1, S2 without m/r/g; ++ LE edema  RESP: Decreased breath sounds  ABD: +bs, soft, nt, no hsm  SKIN: warm, no rashes    Data/  Recent Labs     07/05/22 0439 07/06/22  0446 07/07/22  0516   WBC 19.9* 14.7* 15.6*   HGB 9.3* 8.8* 9.6*   HCT 29.8* 27.6* 30.0*   MCV 89.9 88.8 85.9    296 308     Recent Labs     07/05/22 0439 07/06/22  0446 07/07/22  0516   * 142 144   K 3.9 4.2 3.4*    105 105   CO2 16* 18* 24   GLUCOSE 122* 97 94   PHOS  --  4.3 4.2   MG  --  1.60* 2.10   BUN 40* 36* 32*   CREATININE 2.0* 2.0* 1.7*   LABGLOM 24* 24* 29*   GFRAA 29* 29* 35*     Component      Latest Ref Rng & Units 6/22/2022 6/22/2022           4:10 PM  4:10 PM   Color, UA      Straw/Yellow Yellow    Clarity, UA      Clear Clear    Glucose, UA      Negative mg/dL Negative    Bilirubin, Urine      Negative Negative    Ketones, Urine      Negative mg/dL TRACE (A)    Specific Gravity, UA      1.005 - 1.030 1.025    Blood, Urine      Negative Negative    pH, UA      5.0 - 8.0 5.5    Protein, UA      Negative mg/dL TRACE (A)    Urobilinogen, Urine      <2.0 E.U./dL 0.2    Nitrite, Urine      Negative Negative    Leukocyte Esterase, Urine      Negative Negative    Microscopic Examination       YES    Urine Type       NotGiven    Mucus, UA      None Seen /LPF Rare (A)    WBC, UA      0 - 5 /HPF 10-20 (A)    RBC, UA      0 - 4 /HPF 3-4    Epithelial Cells, UA      0 - 5 /HPF 0-1    Bacteria, UA      None Seen /HPF 2+ (A)    Sodium, Ur      Not Established mmol/L  <20   Potassium, Ur      Not Established mmol/L  26.3   Chloride      Not Established mmol/L  <20       Assessment/     - Acute kidney injury - multi-factorial ATN injury in setting of aspiration PNA, hypotension, & elevated vancomycin levels     - Chronic kidney disease stage 3b - background DM/HTN/CHF              SCr 1.6              Follows with [facility]     - Acute hypoxemic respiratory failure/Aspiration PNA/Pulmonary edema - on ventilator     - Diastolic CHF     - Atrial fibrillation    - Anemia - prn prbc's     - Hypertension    - Hypokalemia - prn replacement    -Metabolic acidosis    -Nausea vomiting on 7/1 defer to internal medicine  Better    -Dysphagia, patient has been started on some puréed food    Plan/   -Lasix 40 mg IV x1   Patient received potassium chloride 20 M EQ x2  -Serial renal panel  -daily wts and strict i/o  -renal dose medications   -avoid nephrotoxins    ____________________________________  Antonio Espana MD  [facility]  [email or web address]  Office: [phone]

## 2022-07-07 NOTE — PROGRESS NOTES
FEES Brief    Name: Filemon Balderrama  : 1940  Medical Diagnosis: Shortness of breath [R06.02]  Septicemia ([address] [A41.9]  Chronic anemia [D64.9]  Acute respiratory failure with hypoxia (HCC) [J96.01]  Aspiration pneumonia of left lower lobe, unspecified aspiration pneumonia type ([address] [J69.0]  Chronic kidney disease, unspecified CKD stage [N18.9]      FEES completed at this time. Preliminary rec's include puree solids and mildly thick (nectar) liquids; meds crushed in puree as able . Formal report to follow pending detailed review of study. Call with questions or concerns.  Thank you,      Ivelisse Watson M.A., [address] [facility]  Speech-Language Pathologist  Phone: [phone], [phone]

## 2022-07-07 NOTE — PROGRESS NOTES
RT Inhaler-Nebulizer Bronchodilator Protocol Note    There is a bronchodilator order in the chart from a provider indicating to follow the RT Bronchodilator Protocol and there is an Initiate RT Inhaler-Nebulizer Bronchodilator Protocol order as well (see protocol at bottom of note). CXR Findings:  No results found. The findings from the last RT Protocol Assessment were as follows:   History Pulmonary Disease: Chronic pulmonary disease  Respiratory Pattern: Dyspnea on exertion or RR 21-25 bpm  Breath Sounds: Slightly diminished and/or crackles  Cough: Weak, productive  Indication for Bronchodilator Therapy: On home bronchodilators  Bronchodilator Assessment Score: 8    Aerosolized bronchodilator medication orders have been revised according to the RT Inhaler-Nebulizer Bronchodilator Protocol below. Respiratory Therapist to perform RT Therapy Protocol Assessment initially then follow the protocol. Repeat RT Therapy Protocol Assessment PRN for score 0-3 or on second treatment, BID, and PRN for scores above 3. No Indications - adjust the frequency to every 6 hours PRN wheezing or bronchospasm, if no treatments needed after 48 hours then discontinue using Per Protocol order mode. If indication present, adjust the RT bronchodilator orders based on the Bronchodilator Assessment Score as indicated below. Use Inhaler orders unless patient has one or more of the following: on home nebulizer, not able to hold breath for 10 seconds, is not alert and oriented, cannot activate and use MDI correctly, or respiratory rate 25 breaths per minute or more, then use the equivalent nebulizer order(s) with same Frequency and PRN reasons based on the score. If a patient is on this medication at home then do not decrease Frequency below that used at home.     0-3 - enter or revise RT bronchodilator order(s) to equivalent RT Bronchodilator order with Frequency of every 4 hours PRN for wheezing or increased work of breathing using Per Protocol order mode. 4-6 - enter or revise RT Bronchodilator order(s) to two equivalent RT bronchodilator orders with one order with BID Frequency and one order with Frequency of every 4 hours PRN wheezing or increased work of breathing using Per Protocol order mode. 7-10 - enter or revise RT Bronchodilator order(s) to two equivalent RT bronchodilator orders with one order with TID Frequency and one order with Frequency of every 4 hours PRN wheezing or increased work of breathing using Per Protocol order mode. 11-13 - enter or revise RT Bronchodilator order(s) to one equivalent RT bronchodilator order with QID Frequency and an Albuterol order with Frequency of every 4 hours PRN wheezing or increased work of breathing using Per Protocol order mode. Greater than 13 - enter or revise RT Bronchodilator order(s) to one equivalent RT bronchodilator order with every 4 hours Frequency and an Albuterol order with Frequency of every 2 hours PRN wheezing or increased work of breathing using Per Protocol order mode.          Electronically signed by Chico Pugh RCP on 7/7/2022 at 7:47 PM

## 2022-07-08 LAB
ANION GAP SERPL CALCULATED.3IONS-SCNC: 11 MMOL/L (ref 3–16)
BASOPHILS ABSOLUTE: 0.1 K/UL (ref 0–0.2)
BASOPHILS RELATIVE PERCENT: 0.5 %
BUN BLDV-MCNC: 31 MG/DL (ref 7–20)
CALCIUM SERPL-MCNC: 9.6 MG/DL (ref 8.3–10.6)
CHLORIDE BLD-SCNC: 105 MMOL/L (ref 99–110)
CO2: 28 MMOL/L (ref 21–32)
CREAT SERPL-MCNC: 1.7 MG/DL (ref 0.6–1.2)
EOSINOPHILS ABSOLUTE: 0.7 K/UL (ref 0–0.6)
EOSINOPHILS RELATIVE PERCENT: 5.4 %
GFR AFRICAN AMERICAN: 35
GFR NON-AFRICAN AMERICAN: 29
GLUCOSE BLD-MCNC: 125 MG/DL (ref 70–99)
GLUCOSE BLD-MCNC: 129 MG/DL (ref 70–99)
GLUCOSE BLD-MCNC: 152 MG/DL (ref 70–99)
GLUCOSE BLD-MCNC: 87 MG/DL (ref 70–99)
GLUCOSE BLD-MCNC: 91 MG/DL (ref 70–99)
GLUCOSE BLD-MCNC: 92 MG/DL (ref 70–99)
GLUCOSE BLD-MCNC: 96 MG/DL (ref 70–99)
HCT VFR BLD CALC: 29.1 % (ref 36–48)
HEMOGLOBIN: 9.4 G/DL (ref 12–16)
LYMPHOCYTES ABSOLUTE: 1 K/UL (ref 1–5.1)
LYMPHOCYTES RELATIVE PERCENT: 7.7 %
MAGNESIUM: 1.9 MG/DL (ref 1.8–2.4)
MCH RBC QN AUTO: 27.8 PG (ref 26–34)
MCHC RBC AUTO-ENTMCNC: 32.5 G/DL (ref 31–36)
MCV RBC AUTO: 85.6 FL (ref 80–100)
MONOCYTES ABSOLUTE: 1.2 K/UL (ref 0–1.3)
MONOCYTES RELATIVE PERCENT: 9.2 %
NEUTROPHILS ABSOLUTE: 10.2 K/UL (ref 1.7–7.7)
NEUTROPHILS RELATIVE PERCENT: 77.2 %
PDW BLD-RTO: 14.3 % (ref 12.4–15.4)
PERFORMED ON: ABNORMAL
PERFORMED ON: NORMAL
PHOSPHORUS: 4 MG/DL (ref 2.5–4.9)
PLATELET # BLD: 316 K/UL (ref 135–450)
PMV BLD AUTO: 8.4 FL (ref 5–10.5)
POTASSIUM SERPL-SCNC: 3.7 MMOL/L (ref 3.5–5.1)
RBC # BLD: 3.4 M/UL (ref 4–5.2)
SODIUM BLD-SCNC: 144 MMOL/L (ref 136–145)
VANCOMYCIN RANDOM: 23.5 UG/ML
WBC # BLD: 13.2 K/UL (ref 4–11)

## 2022-07-08 PROCEDURE — 97530 THERAPEUTIC ACTIVITIES: CPT

## 2022-07-08 PROCEDURE — 6370000000 HC RX 637 (ALT 250 FOR IP): Performed by: INTERNAL MEDICINE

## 2022-07-08 PROCEDURE — 83735 ASSAY OF MAGNESIUM: CPT

## 2022-07-08 PROCEDURE — 2700000000 HC OXYGEN THERAPY PER DAY

## 2022-07-08 PROCEDURE — 84100 ASSAY OF PHOSPHORUS: CPT

## 2022-07-08 PROCEDURE — 94669 MECHANICAL CHEST WALL OSCILL: CPT

## 2022-07-08 PROCEDURE — 85025 COMPLETE CBC W/AUTO DIFF WBC: CPT

## 2022-07-08 PROCEDURE — 94660 CPAP INITIATION&MGMT: CPT

## 2022-07-08 PROCEDURE — 94640 AIRWAY INHALATION TREATMENT: CPT

## 2022-07-08 PROCEDURE — 99233 SBSQ HOSP IP/OBS HIGH 50: CPT | Performed by: INTERNAL MEDICINE

## 2022-07-08 PROCEDURE — C9113 INJ PANTOPRAZOLE SODIUM, VIA: HCPCS | Performed by: INTERNAL MEDICINE

## 2022-07-08 PROCEDURE — 99232 SBSQ HOSP IP/OBS MODERATE 35: CPT | Performed by: INTERNAL MEDICINE

## 2022-07-08 PROCEDURE — 2060000000 HC ICU INTERMEDIATE R&B

## 2022-07-08 PROCEDURE — 6360000002 HC RX W HCPCS: Performed by: INTERNAL MEDICINE

## 2022-07-08 PROCEDURE — 94761 N-INVAS EAR/PLS OXIMETRY MLT: CPT

## 2022-07-08 PROCEDURE — 80202 ASSAY OF VANCOMYCIN: CPT

## 2022-07-08 PROCEDURE — 92526 ORAL FUNCTION THERAPY: CPT

## 2022-07-08 PROCEDURE — 97110 THERAPEUTIC EXERCISES: CPT

## 2022-07-08 PROCEDURE — 80048 BASIC METABOLIC PNL TOTAL CA: CPT

## 2022-07-08 PROCEDURE — 2580000003 HC RX 258: Performed by: INTERNAL MEDICINE

## 2022-07-08 RX ORDER — FUROSEMIDE 10 MG/ML
40 INJECTION INTRAMUSCULAR; INTRAVENOUS DAILY
Status: DISCONTINUED | OUTPATIENT
Start: 2022-07-08 | End: 2022-07-09

## 2022-07-08 RX ORDER — POTASSIUM CHLORIDE 750 MG/1
20 TABLET, EXTENDED RELEASE ORAL DAILY
Status: DISCONTINUED | OUTPATIENT
Start: 2022-07-08 | End: 2022-07-08

## 2022-07-08 RX ORDER — SODIUM BICARBONATE 650 MG/1
650 TABLET ORAL DAILY
Status: DISCONTINUED | OUTPATIENT
Start: 2022-07-09 | End: 2022-07-19 | Stop reason: HOSPADM

## 2022-07-08 RX ADMIN — PANTOPRAZOLE SODIUM 40 MG: 40 INJECTION, POWDER, LYOPHILIZED, FOR SOLUTION INTRAVENOUS at 08:53

## 2022-07-08 RX ADMIN — IPRATROPIUM BROMIDE AND ALBUTEROL SULFATE 1 AMPULE: 2.5; .5 SOLUTION RESPIRATORY (INHALATION) at 22:59

## 2022-07-08 RX ADMIN — MONTELUKAST SODIUM 10 MG: 10 TABLET, COATED ORAL at 08:52

## 2022-07-08 RX ADMIN — SODIUM BICARBONATE 650 MG: 650 TABLET ORAL at 08:52

## 2022-07-08 RX ADMIN — MEROPENEM 1000 MG: 1 INJECTION, POWDER, FOR SOLUTION INTRAVENOUS at 05:31

## 2022-07-08 RX ADMIN — PANTOPRAZOLE SODIUM 40 MG: 40 INJECTION, POWDER, LYOPHILIZED, FOR SOLUTION INTRAVENOUS at 21:41

## 2022-07-08 RX ADMIN — APIXABAN 2.5 MG: 5 TABLET, FILM COATED ORAL at 08:52

## 2022-07-08 RX ADMIN — LAMOTRIGINE 50 MG: 25 TABLET ORAL at 21:47

## 2022-07-08 RX ADMIN — Medication 15 ML: at 21:48

## 2022-07-08 RX ADMIN — CARBIDOPA AND LEVODOPA 1 TABLET: 25; 100 TABLET ORAL at 08:52

## 2022-07-08 RX ADMIN — CARBIDOPA AND LEVODOPA 1 TABLET: 25; 100 TABLET ORAL at 21:41

## 2022-07-08 RX ADMIN — PROPAFENONE HYDROCHLORIDE 150 MG: 150 TABLET, FILM COATED ORAL at 21:49

## 2022-07-08 RX ADMIN — PROPAFENONE HYDROCHLORIDE 150 MG: 150 TABLET, FILM COATED ORAL at 13:41

## 2022-07-08 RX ADMIN — IPRATROPIUM BROMIDE AND ALBUTEROL SULFATE 1 AMPULE: 2.5; .5 SOLUTION RESPIRATORY (INHALATION) at 11:12

## 2022-07-08 RX ADMIN — ACETYLCYSTEINE 600 MG: 200 INHALANT RESPIRATORY (INHALATION) at 09:18

## 2022-07-08 RX ADMIN — FUROSEMIDE 40 MG: 10 INJECTION, SOLUTION INTRAVENOUS at 13:41

## 2022-07-08 RX ADMIN — MEROPENEM 1000 MG: 1 INJECTION, POWDER, FOR SOLUTION INTRAVENOUS at 19:32

## 2022-07-08 RX ADMIN — IPRATROPIUM BROMIDE AND ALBUTEROL SULFATE 1 AMPULE: 2.5; .5 SOLUTION RESPIRATORY (INHALATION) at 09:18

## 2022-07-08 RX ADMIN — SODIUM CHLORIDE, PRESERVATIVE FREE 10 ML: 5 INJECTION INTRAVENOUS at 21:50

## 2022-07-08 RX ADMIN — SODIUM CHLORIDE, PRESERVATIVE FREE 10 ML: 5 INJECTION INTRAVENOUS at 08:53

## 2022-07-08 RX ADMIN — LAMOTRIGINE 50 MG: 25 TABLET ORAL at 08:55

## 2022-07-08 RX ADMIN — ATORVASTATIN CALCIUM 40 MG: 40 TABLET, FILM COATED ORAL at 21:41

## 2022-07-08 RX ADMIN — CARBIDOPA AND LEVODOPA 1 TABLET: 25; 100 TABLET ORAL at 13:40

## 2022-07-08 RX ADMIN — IPRATROPIUM BROMIDE AND ALBUTEROL SULFATE 1 AMPULE: 2.5; .5 SOLUTION RESPIRATORY (INHALATION) at 15:03

## 2022-07-08 RX ADMIN — APIXABAN 2.5 MG: 5 TABLET, FILM COATED ORAL at 21:41

## 2022-07-08 RX ADMIN — FLUCONAZOLE 200 MG: 2 INJECTION, SOLUTION INTRAVENOUS at 09:13

## 2022-07-08 RX ADMIN — DULOXETINE HYDROCHLORIDE 60 MG: 60 CAPSULE, DELAYED RELEASE ORAL at 08:52

## 2022-07-08 RX ADMIN — ACETYLCYSTEINE 600 MG: 200 INHALANT RESPIRATORY (INHALATION) at 19:48

## 2022-07-08 RX ADMIN — METOPROLOL TARTRATE 50 MG: 50 TABLET, FILM COATED ORAL at 08:56

## 2022-07-08 RX ADMIN — METOPROLOL TARTRATE 50 MG: 50 TABLET, FILM COATED ORAL at 21:41

## 2022-07-08 RX ADMIN — POTASSIUM BICARBONATE 20 MEQ: 391 TABLET, EFFERVESCENT ORAL at 12:27

## 2022-07-08 RX ADMIN — PROPAFENONE HYDROCHLORIDE 150 MG: 150 TABLET, FILM COATED ORAL at 05:34

## 2022-07-08 RX ADMIN — IPRATROPIUM BROMIDE AND ALBUTEROL SULFATE 1 AMPULE: 2.5; .5 SOLUTION RESPIRATORY (INHALATION) at 19:48

## 2022-07-08 NOTE — PROGRESS NOTES
Inpatient Physical Therapy Daily Treatment Note    Unit: ICU  Date:  7/8/2022  Patient Name:    Madonna Shannon  Admitting diagnosis:  Shortness of breath [R06.02]  Septicemia ([facility] [A41.9]  Chronic anemia [D64.9]  Acute respiratory failure with hypoxia ([facility] [J96.01]  Aspiration pneumonia of left lower lobe, unspecified aspiration pneumonia type ([facility] [J69.0]  Chronic kidney disease, unspecified CKD stage [N18.9]  Admit Date:  6/17/2022  Precautions/Restrictions:  Fall risk, Bed/chair alarm, Lines -IV, Supplemental O2 (3 L), Zimmerman catheter and PICC left, Telemetry, Continuous pulse oximetry and Isolation Precautions: Contact      Discharge Recommendations: SNF  DME needs for discharge: defer to facility       Therapy recommendation for EMS Transport: requires transport by cot due to requires lift equiptment to transfer    Therapy recommendations for staff:   Assist of 2 with use of MAXI-Move for all transfers to/from chair  Ax2 for bed mobility  History of Present Illness: H & P as per Yari Colunga MD's note dated 6/17/2022  The patient is [phone] y.o. female Hx of Parkinson's disease, Afib on Eliquis, BETSY, CKD, lives at home with , who presents with SOB and cough. Pt reports cough and SOB and throat pain and odynophagia and dysphagia coughing with every meal. Her cough is deep and rhonchorous but she is struggling to bring up any secretions. She is not sure about fever. She was initially admitted to [facility] with concerns for aspiration PNA and new hypoxia. This am she is in resp distress, very poor and weak cough, O2 sats 70s despite 10-12l/min O2 - transitioned to NRB mask with sats improved to 92% and then transitioned to vapotherm. I ordered to transfer to ICU due to rapid worsening of oxygenation and pt struggling managing own secretion with very weak cough. Ordered 3% saline nebs and repeat CXR and Pulm evaluation.  Given rapid progression of hypoxia and clinical status I strongly suspect mucus plugging. She is to stay strict NPO.   6/27: Patient extubated. Repeat emesis 7/1 and spiking high fever and worsening O2 requirement - she was transferred back to ICU on 7/1 overnight.   Home Health S4 Level Recommendation: NA  AM-PAC Mobility Score   AM-PAC Inpatient Mobility Raw Score : 8  AM-PAC Inpatient Mobility without Stair Climbing Raw Score : 7    Treatment Time:  17:15-17:30  Treatment number: 3  Timed Code Treatment Minutes: 15 minutes  Total Treatment Minutes: 15 minutes    Cognition    A&O Person, Place and Time   Able to follow 2 step commands    Subjective  Patient lying supine in bed with no family present   Pt agreeable to this PT tx. Reports fatigue,agreeable to bed level exercises    Pain   Yes  Location: all over  Rating:    mild/10  Pain Medicine Status: No request made     Bed Mobility   Supine to Sit:    Not Tested  Sit to Supine:   Not Tested  Rolling:   Not Tested  Scooting:   Not Tested        Therapeutic Exercise   Ankle Pumps x 10 reps  Glut sets x 10 reps  Quad sets x 10 reps  Heel slides x 10 reps  Hip abduction: x 10 reps  ER/IR x10      Patient Education      Role of PT, POC, Discharge recommendations, safety awareness, HEP and calling for assist with mobility. Positioning Needs       Pt in bed, alarm set, positioned in proper neutral alignment and pressure relief provided. Call light provided and all needs within reach      Activity Tolerance   Pt completed therapy session with No adverse symptoms noted w/activity. Other      Assessment :  Patient demonstrated good tolerance to bed level activity this date. Recommending SNF upon discharge as patient functioning well below baseline, demonstrates good rehab potential and unable to return home due to limited or no family support, burden of care beyond caregiver ability and home environment not conducive to patient recovery. Goals (all goals ongoing unless otherwise indicated)  To be met in 3 visits:  1). Independent with LE Ex x 10 reps     To be met in 6 visits:  1).  Supine to/from sit: Mod A   2).  Sit to/from stand: Mod A   3).  Bed to chair: Mod A  and 2 persons  4). Patient will be able to sit at the EOB for ~10 min.     Plan   Continue with plan of care. Signature: Dolores Sy, PT #[id number]    If patient discharges from this facility prior to next visit, this note will serve as the Discharge Summary.

## 2022-07-08 NOTE — PROGRESS NOTES
[facility] Progress Note      Isamar Woodson     : 1940    DATE OF VISIT:  2022  DATE OF ADMISSION:  2022       Subjective:     Isamar Woodson is a 80 y.o. female whom I've been seeing for a nosocomial aspiration pneumonia. Since I last saw her, she had a repeat therapeutic bronchoscopy yesterday, and definitely seems to be doing a bit better today; a bit stronger, not as short of breath, doing a bit more with PT. Using an incentive spirometer and flutter valve frequently, with help from her . No F/C/D, a bit of positional nausea, no diarrhea, no CP, still a bit of a sore throat and mouth. Ms. Darcie Iqbal has a past medical history of Anemia, unspecified, Asthma, Atrial fibrillation ([address] Baker's cyst, Basal cell carcinoma of left cheek, Blind left eye, Carotid artery stenoses, Carotid stenosis, Cervical spinal cord compression (HCC), 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-sided sciatica, CKD (chronic kidney disease) stage 3, GFR 30-59 ml/min ([address] Community acquired pneumonia of left lower lobe of lung, CRI, Detached retina, left, Diverticulosis, DJD (degenerative joint disease), Edema, Fatty liver, GERD (gastroesophageal reflux disease), Hearing loss, Herniated lumbar intervertebral disc, Hx stage 2 sacral decubitus ulcer, Hyperlipidemia, Hypertension, Hypothyroid, Morbid obesity with BMI of 40.0-44.9, adult ([address] BETSY treated with BiPAP, Parkinson's disease ([address] Partial symptomatic epilepsy with complex partial seizures, not intractable, without status epilepticus ([address] Restless leg syndrome, Restless legs syndrome, Rheumatic fever, Sleep apnea, Tremor, essential, Type 2 diabetes mellitus without complication ([address] and Type II or unspecified type diabetes mellitus without mention of complication, not stated as uncontrolled.     Current Facility-Administered Medications: [START ON 2022] sodium bicarbonate tablet 650 mg, 650 mg, Oral, Daily  furosemide (LASIX) injection 40 mg, 40 mg, IntraVENous, Daily  potassium bicarb-citric acid (EFFER-K) effervescent tablet 20 mEq, 20 mEq, Oral, Daily  potassium chloride 20 mEq/50 mL IVPB (Central Line), 20 mEq, IntraVENous, PRN  magnesium sulfate 1000 mg in dextrose 5% 100 mL IVPB, 1,000 mg, IntraVENous, PRN  sodium phosphate 20.73 mmol in sodium chloride 0.9 % 250 mL IVPB, 0.16 mmol/kg, IntraVENous, PRN **OR** sodium phosphate 41.43 mmol in sodium chloride 0.9 % 500 mL IVPB, 0.32 mmol/kg, IntraVENous, PRN  fluconazole (DIFLUCAN) in 0.9 % sodium chloride IVPB 200 mg, 200 mg, IntraVENous, Q24H  acetylcysteine (MUCOMYST) 20 % solution 600 mg, 600 mg, Inhalation, BID  metoprolol tartrate (LOPRESSOR) tablet 50 mg, 50 mg, Oral, BID  ipratropium-albuterol (DUONEB) nebulizer solution 1 ampule, 1 ampule, Inhalation, Q4H While awake  morphine (PF) injection 1 mg, 1 mg, IntraVENous, Q4H PRN  meropenem (MERREM) 1,000 mg in sodium chloride 0.9 % 100 mL IVPB (mini-bag), 1,000 mg, IntraVENous, Q12H  vancomycin (VANCOCIN) intermittent dosing (placeholder), , Other, RX Placeholder  pantoprazole (PROTONIX) injection 40 mg, 40 mg, IntraVENous, BID  carboxymethylcellulose PF (THERATEARS) 1 % ophthalmic gel 1 drop, 1 drop, Both Eyes, 4x Daily PRN  0.9 % sodium chloride infusion, 25 mL, IntraVENous, PRN  chlorhexidine (PERIDEX) 0.12 % solution 15 mL, 15 mL, Mouth/Throat, BID  sodium chloride flush 0.9 % injection 10 mL, 10 mL, IntraVENous, 2 times per day  sodium chloride flush 0.9 % injection 10 mL, 10 mL, IntraVENous, PRN  0.9 % sodium chloride infusion, , IntraVENous, PRN  promethazine (PHENERGAN) tablet 12.5 mg, 12.5 mg, Oral, Q6H PRN **OR** ondansetron (ZOFRAN) injection 4 mg, 4 mg, IntraVENous, Q6H PRN  acetaminophen (TYLENOL) tablet 650 mg, 650 mg, Oral, Q6H PRN **OR** acetaminophen (TYLENOL) suppository 650 mg, 650 mg, Rectal, Q6H PRN  apixaban (ELIQUIS) tablet 2.5 mg, 2.5 mg, Oral, BID  atorvastatin (LIPITOR) tablet 40 mg, 40 mg, Oral, Nightly  carbidopa-levodopa (SINEMET)  MG per tablet 1 tablet, 1 tablet, Oral, 4x Daily  DULoxetine (CYMBALTA) extended release capsule 60 mg, 60 mg, Oral, Daily  lamoTRIgine (LAMICTAL) tablet 50 mg, 50 mg, Oral, BID  montelukast (SINGULAIR) tablet 10 mg, 10 mg, Oral, Daily  propafenone (RYTHMOL) tablet 150 mg, 150 mg, Oral, 3 times per day  albuterol (PROVENTIL) nebulizer solution 2.5 mg, 2.5 mg, Nebulization, Q4H PRN     This is day 8 of [facility]. Allergies: Levaquin [levofloxacin], Metoclopramide, Phenazopyridine, Pyridium [phenazopyridine hcl], Reglan [metoclopramide hcl], and Reglan [metoclopramide hcl]    Pertinent items from the review of systems are discussed in the HPI; the remainder of the ROS was reviewed and is negative.      Objective:     Vital signs over the last 24 hours:  Temp  Av °F (36.7 °C)  Min: 97.5 °F (36.4 °C)  Max: 98.4 °F (36.9 °C)  Pulse  Av.1  Min: 66  Max: 734  Systolic (39LWD), EIF:382 , Min:100 , IOJ:621   Diastolic (01PYV), HRC:91, Min:53, Max:147  Resp  Av.8  Min: 14  Max: 33  SpO2  Av.3 %  Min: 91 %  Max: 100 %    Constitutional:  well-developed, well-nourished, overweight, a bit stronger than yesterday, conversant  Psychiatric:  oriented to person, place and time; mood and affect appropriate for the situation   Eyes:  pupils equal, round and reactive to light; sclerae anicteric, conjunctivae not pale  ENT:  atraumatic; oral mucosa maybe not quite as dry, a bit red, no ulcers, improving thrush and some adherent mucoid secretions  Resp:  Decreased at the bases, rhonchi BL, maybe a few RLL crackles, no dense signs of consolidation  Cardiovascular:  heart regular, no gallop, no murmur; moderate extremity edema; no IV phlebitis (LUE PICC in place)  GI:  abdomen soft, NT, ND, normal bowel sounds, no palpable masses or organomegaly  Musculoskeletal:  no clubbing, cyanosis or petechiae; extremities with no gross effusions, joint misalignment or acute arthritis  Skin: warm, dry, normal turgor; no rash, no ulcers   ______________________________    Recent Labs     07/08/22  0447 07/07/22  0516 07/06/22  0446   WBC 13.2* 15.6* 14.7*   HGB 9.4* 9.6* 8.8*   HCT 29.1* 30.0* 27.6*   MCV 85.6 85.9 88.8    308 296     Lab Results   Component Value Date    CREATININE 1.7 (H) 07/08/2022     Lab Results   Component Value Date    LABALBU 3.4 07/01/2022     Lab Results   Component Value Date    ALT <5 (L) 06/23/2022    AST 14 (L) 06/23/2022    ALKPHOS 66 06/23/2022    BILITOT <0.2 06/23/2022      Lab Results   Component Value Date    LABA1C 6.4 09/27/2021     Other recent pertinent labs: Anion gap 11. Vancomycin level 23.5. ANC down to 10.2k, mild eosinophilia (700). ______________________________    Recent pertinent micro results: All BCx negative. MRSA swab (+). Prior RCx only with Candida. New bronch cultures pending.   ______________________________    Recent imaging results (last 7 days):     CT ABDOMEN PELVIS WO CONTRAST Additional Contrast? None    Result Date: 7/2/2022  1. Nonspecific, nonobstructive gaseous bowel pattern. 2. Extensive diverticulosis of the left colon, lacking acute inflammatory changes. 3. Bibasilar consolidations, atelectasis versus pneumonia. Pleural effusions have resolved since 06/23/2022. XR ABDOMEN (KUB) (SINGLE AP VIEW)    Result Date: 7/3/2022  1. Gaseous distention of the stomach, otherwise, unremarkable bowel gas pattern. CT CHEST WO CONTRAST    Result Date: 7/3/2022  1. Multifocal bronchopneumonia, with progression since 06/17/2022. 2. Small bilateral pleural effusions. 3. Narrowing of the AP diameter of the trachea. 4. Mild cardiomegaly. XR CHEST PORTABLE    Result Date: 7/3/2022  Airspace disease within the mid to lower lung zones is slightly increased as compared to prior. Possible small left pleural effusion.      XR CHEST PORTABLE    Result Date: 7/1/2022  Stable chest.  Persistent left lower lobe opacification with probable effusion. XR CHEST PORTABLE    Result Date: 7/1/2022  Persistent left lower lobe opacification and effusion. Interval clearing of perihilar edema and right basilar opacification and effusion.       Assessment:     Patient Active Problem List   Diagnosis Code    GERD (gastroesophageal reflux disease) K21.9    Essential hypertension I10    Hyperlipidemia E78.5    Restless leg syndrome G25.81    Anemia of chronic disease D63.8    Postural dizziness R42    Moderate persistent asthma J45.40    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-sided sciatica M54.41, G89.29    Morbid obesity with BMI of 40.0-44.9, adult ([facility]) E66.01, Z68.41    Type 2 diabetes mellitus with stage 4 chronic kidney disease, without long-term current use of insulin ([facility]) E11.22, N18.4    CAD (coronary artery disease) I25.10    Parkinson disease ([facility] G20    Severe obstructive sleep apnea G47.33    Hypothyroidism E03.9    Hepatic steatosis K76.0    Diverticulosis K57.90    Blind left eye H54.40    L carotid artery stenosis s/p CEA I65.29    Septicemia ([facility]) A41.9    PAF (paroxysmal atrial fibrillation) ([facility]) I48.0    COPD (chronic obstructive pulmonary disease) ([facility]) D02.6    Diastolic congestive heart failure ([facility]) I50.30    Partial symptomatic epilepsy with complex partial seizures, not intractable, without status epilepticus ([facility]) G40.209    Chronic kidney disease N18.9    Acute kidney injury superimposed on CKD ([facility]) N17.9, N18.9    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    Sialadenitis K11.20    Chest pain R07.9    Pulmonary HTN ([facility]) I27.20    Mitral valve insufficiency I34.0    C. difficile colitis A04.72    Laceration of left lower extremity J33.823B    Pulmonary nodules R91.8    Sensorineural hearing loss, bilateral H90.3    Aspiration pneumonia of left lower lobe ([facility]) J69.0    Mild protein-calorie malnutrition ([facility]) E44.1    Chronic anemia D64.9    Thrush B37.0    MRSA colonization Z22.322    BETSY (obstructive sleep apnea) X76.30    Metabolic acidosis B82.0    Pulmonary congestion R09.89    Mucus plugging of bronchi T17.500A     Assessment of today's active condition(s):      --          Background including prior tobacco use, asthma, morbid obesity, Parkinson's disease, dysphagia.     --          Admission 3 weeks ago with aspiration pneumonitis versus pneumonia, with acute respiratory failure; expected leukocytosis, lactate and PCT not impressive, but given how sick she was, completely reasonable to treat with Abx for actual bacterial pneumonia.     --          Another episode of sepsis and hypoxemia late last week, this time with a higher WBC count and higher PCT level, this time not quite so sick from a pulmonary standpoint (able to be managed with BiPAP, not intubation), but again, given the lab changes and the severity of the acute illness, and the findings on CT scan, I think very reasonable to treat with antibiotics for presumed nosocomial bacterial pneumonia, not just a sterile pneumonitis. The vancomycin seems wise, given the MRSA colonization, and the meropenem to cover nosocomial GNRs empirically. Seemed to be making some slow, steady improvement earlier in the week, any stagnation now probably from a standpoint of weakness and poor clearance of secretions more than untreated infection, I think. A bit of a jump in her clinical condition today, I think largely thanks to the bronch yesterday.      --          FARHEEN slowly resolving. Still significant edema.      --          Thrush, improving.      Treatment recs:     Continue vancomycin-meropenem for aspiration pneumonia.  Would plan on 10 days total, since she just seemed to be turning the corner around day 6-7     Watch for Cdiff, IV site infection, other sites of Candidiasis, etc.      Diflucan for thrush (would probably continue that as long as she's on pneumonia antibiotics, or just a bit longer). Keep working on PT, speech / swallow therapy, incentive spirometry, flutter valve, etc.      D/W her  at bedside.      Electronically signed by Rafal Campbell MD on 7/8/2022 at 12:16 PM.

## 2022-07-08 NOTE — PROGRESS NOTES
Comprehensive Nutrition Assessment    Type and Reason for Visit:  Reassess    Nutrition Recommendations/Plan:   1. Continue ADULT DIET; Dysphagia - Pureed; 3 carb choices per meal; Low Sodium diet order + Mildly Thick Liquids - consistency changes, per SLP. 2. Continue magic cups with lunch and dinner meals. 3. Monitor appetite, meal intake, and acceptance/intake of ONS. 4. Please obtain an updated weight for this patient - last weight was obtained on 7/6/22.   5. Monitor nutrition-related labs, bowel function, and weight trends. Malnutrition Assessment:  Malnutrition Status:  Mild malnutrition (07/08/22 1626)    Context:  Acute Illness     Findings of the 6 clinical characteristics of malnutrition:  Energy Intake:  50% or less of estimated energy requirements for 5 or more days  Weight Loss:   (- 11# or 3.9% weight loss since 6/18/22)     Body Fat Loss:  No significant body fat loss     Muscle Mass Loss:  No significant muscle mass loss    Fluid Accumulation:  No significant fluid accumulation     Strength:  Not Performed    Nutrition Assessment:    patient has slightly improved from a nutritional standpoint AEB po diet advancement and patient is trying to consume some po nutrition/meals, however, she remains at risk for further compromise d/t decreased appetite/po intake, weakness/fatigue, need for altered po consistencies and patient does not like thickened liquids, and respiratory dysfunction; will continue ADULT DIET;  Dysphagia - Pureed; 3 carb choices per meal; Low Sodium diet order + Mildly Thick Liquids and magic cups with lunch and dinner meals    Nutrition Related Findings:    patient is A & O x 4; L eye is cloudy; patient appears weak and tired today; she could speak to this RD during f/u assessment but her voice was weak and she whispered at times; patient had her oldest son and DIL present during f/u visit today; patient consumed some of her lunch today but she does not like the thickened liquids; + BM on 7/6/22 Wound Type: Stage II,Pressure Injury (stage 2 pressure injury on R buttocks)       Current Nutrition Intake & Therapies:    Average Meal Intake: 1-25%  Average Supplements Intake: Unable to assess  ADULT DIET; Dysphagia - Pureed; 3 carb choices (45 gm/meal); Low Sodium (2 gm); Mildly Thick (Nectar)  ADULT ORAL NUTRITION SUPPLEMENT; Dinner, Lunch; Frozen Oral Supplement    Anthropometric Measures:  Height: 5' 6\" (167.6 cm)  Ideal Body Weight (IBW): 130 lbs (59 kg)    Admission Body Weight: 270 lb (122.5 kg) (obtained on 6/18/22; actual weight)  Current Body Weight: 285 lb 7.9 oz (129.5 kg) (obtained on 7/6/22; actual weight), 219.6 % IBW.  Weight Source: Bed Scale  Current BMI (kg/m2): 46.1  Usual Body Weight: 270 lb (122.5 kg) (obtained on 6/18/22; actual weight)  % Weight Change (Calculated): 5.7  Weight Adjustment For: No Adjustment                 BMI Categories: Obese Class 3 (BMI 40.0 or greater) (46.08)    Estimated Daily Nutrient Needs:  Energy Requirements Based On: Kcal/kg  Weight Used for Energy Requirements: Current  Energy (kcal/day): 1040 - 1300 kcals based on 8-10 kcals/kg/CBW  Weight Used for Protein Requirements: Ideal  Protein (g/day): 118 - 130 g protein based on 2.0-2.2 g/kg/IBW  Method Used for Fluid Requirements: 1 ml/kcal  Fluid (ml/day): 1040 - 1300 ml    Nutrition Diagnosis:   · Inadequate oral intake related to inadequate protein-energy intake,impaired respiratory function as evidenced by swallow study results,wounds,lab values,intake 0-25%      Nutrition Interventions:   Food and/or Nutrient Delivery: Continue Current Diet,Continue Oral Nutrition Supplement  Nutrition Education/Counseling: No recommendation at this time  Coordination of Nutrition Care: Continue to monitor while inpatient,Interdisciplinary Rounds,Speech Therapy  Plan of Care discussed with: ICU Team    Goals:  Previous Goal Met: Progressing toward Goal(s)  Goals: PO intake 50% or greater,by next RD assessment       Nutrition Monitoring and Evaluation:   Behavioral-Environmental Outcomes: None Identified  Food/Nutrient Intake Outcomes: Food and Nutrient Intake,Supplement Intake  Physical Signs/Symptoms Outcomes: Biochemical Data,Chewing or Swallowing,Hemodynamic Status,Meal Time Behavior,Nutrition Focused Physical Findings,Skin,Weight    Discharge Planning:     Too soon to determine     Surendra Shaffer, [address] [place] [address],   Contact: [phone]

## 2022-07-08 NOTE — PROGRESS NOTES
[facility] Progress Note           Subjective/   80y.o. year old female who we are seeing in consultation for FARHEEN/CKD. HPI:  Patient was seen and examined. UO=2.3L the past 24H. Still complains of edema. ROS: No fever or chills. Social: Family at bedside and  joe it is their 61st wedding anniversary today.     Objective/   GEN:  Chronically ill, /67   Pulse 80   Temp 97.8 °F (36.6 °C) (Bladder)   Resp 28   Ht 5' 6\" (1.676 m)   Wt 285 lb 7.9 oz (129.5 kg)   SpO2 96%   BMI 46.08 kg/m²    From 7/4  HEENT: non-icteric, atraumatic  CV: S1, S2 without r/g; ++ LE edema  RESP: Decreased breath sounds bibasal  ABD: +bs, soft, nt, no hsm  SKIN: warm, no rashes    Data/  Recent Labs     07/06/22 0446 07/07/22 0516 07/08/22 0447   WBC 14.7* 15.6* 13.2*   HGB 8.8* 9.6* 9.4*   HCT 27.6* 30.0* 29.1*   MCV 88.8 85.9 85.6    308 316     Recent Labs     07/06/22 0446 07/07/22  0516 07/08/22 0447    144 144   K 4.2 3.4* 3.7    105 105   CO2 18* 24 28   GLUCOSE 97 94 96   PHOS 4.3 4.2 4.0   MG 1.60* 2.10 1.90   BUN 36* 32* 31*   CREATININE 2.0* 1.7* 1.7*   LABGLOM 24* 29* 29*   GFRAA 29* 35* 35*     Component      Latest Ref Rng & Units 6/22/2022 6/22/2022           4:10 PM  4:10 PM   Color, UA      Straw/Yellow Yellow    Clarity, UA      Clear Clear    Glucose, UA      Negative mg/dL Negative    Bilirubin, Urine      Negative Negative    Ketones, Urine      Negative mg/dL TRACE (A)    Specific Gravity, UA      1.005 - 1.030 1.025    Blood, Urine      Negative Negative    pH, UA      5.0 - 8.0 5.5    Protein, UA      Negative mg/dL TRACE (A)    Urobilinogen, Urine      <2.0 E.U./dL 0.2    Nitrite, Urine      Negative Negative    Leukocyte Esterase, Urine      Negative Negative    Microscopic Examination       YES    Urine Type       NotGiven    Mucus, UA      None Seen /LPF Rare (A)    WBC, UA      0 - 5 /HPF 10-20 (A)    RBC, UA      0 - 4 /HPF 3-4 Epithelial Cells, UA      0 - 5 /HPF 0-1    Bacteria, UA      None Seen /HPF 2+ (A)    Sodium, Ur      Not Established mmol/L  <20   Potassium, Ur      Not Established mmol/L  26.3   Chloride      Not Established mmol/L  <20       Assessment/     - Acute kidney injury - multi-factorial ATN injury in setting of aspiration PNA, hypotension, & elevated vancomycin levels. Kidney function near baseline.     - Chronic kidney disease stage 3b - background DM/HTN/CHF              SCr 1.6              Follows with [facility]     - Acute hypoxemic respiratory failure/Aspiration PNA/Pulmonary edema      - Diastolic CHF     - Atrial fibrillation    - Anemia - prn prbc's     - Hypertension    - Hypokalemia, improved. -Metabolic acidosis, resolved    -Dysphagia, patient has been started on some puréed food    Plan/   -Start Lasix 40 mg IV daily with KCl 20meq daily.  -Decrease sodium bicarb to daily.  -Serial renal panel  -daily wts and strict i/o  -renal dose medications   -avoid nephrotoxins    ____________________________________  Durga Sher MD  [facility]  [email or web address]  Office: [phone]

## 2022-07-08 NOTE — PROGRESS NOTES
Pulmonary & Critical Care Medicine ICU Progress Note    CC: shortness of breath     Events of Last 24 hours:   BiPAP overnight 16/8 45% FiO2   This am is down to 4 LPM   On Dysphagia diet         Vascular lines: IV: PICC 6/19/2022 P      Intake/Output Summary (Last 24 hours) at 7/8/2022 0722  Last data filed at 7/8/2022 0438  Gross per 24 hour   Intake 820.35 ml   Output 2320 ml   Net -1499.65 ml         MV: 6/18/2022 -6/27/22  Vent Mode: AC/VC Resp Rate (Set): 0 bmp/Vt (Set, mL): 0 mL/ /FiO2 : 40 %  No results for input(s): PHART, BTE8NVF, PO2ART in the last 72 hours. IV:   sodium chloride      sodium chloride         Vitals:  Blood pressure (!) 164/95, pulse 76, temperature 98.1 °F (36.7 °C), resp. rate 15, height 5' 6\" (1.676 m), weight 285 lb 7.9 oz (129.5 kg), SpO2 95 %, not currently breastfeeding. On  4 L  Constitutional:  In no distress. Eyes: PERRL. Conjunctivae anicteric. ENT: Normal nose. Normal tongue. Neck:  Trachea is midline. No thyroid tenderness. Respiratory: No accessory muscle usage. Decreased breath sounds. No wheezes. No rales. Few rhonchi. Cardiovascular: Normal S1S2. No digit clubbing. No digit cyanosis. +1   LE edema. Gastrointestinal: No mass palpated. No tenderness palpated. No umbilical hernia. Skin: No rash on the exposed extremities. No Nodules or induration on exposed extremities. Psychiatric: No anxiety or Agitation. Alert and Oriented to person, place and time.       Scheduled Meds:   fluconazole  200 mg IntraVENous Q24H    sodium bicarbonate  650 mg Oral BID    acetylcysteine  600 mg Inhalation BID    metoprolol tartrate  50 mg Oral BID    ipratropium-albuterol  1 ampule Inhalation Q4H While awake    meropenem  1,000 mg IntraVENous Q12H    vancomycin (VANCOCIN) intermittent dosing (placeholder)   Other RX Placeholder    pantoprazole  40 mg IntraVENous BID    chlorhexidine  15 mL Mouth/Throat BID    sodium chloride flush  10 mL IntraVENous 2 times per day    apixaban  2.5 mg Oral BID    atorvastatin  40 mg Oral Nightly    carbidopa-levodopa  1 tablet Oral 4x Daily    DULoxetine  60 mg Oral Daily    lamoTRIgine  50 mg Oral BID    montelukast  10 mg Oral Daily    propafenone  150 mg Oral 3 times per day       Data:  CBC:   Recent Labs     07/06/22  0446 07/07/22  0516 07/08/22  0447   WBC 14.7* 15.6* 13.2*   HGB 8.8* 9.6* 9.4*   HCT 27.6* 30.0* 29.1*   MCV 88.8 85.9 85.6    308 316     BMP:   Recent Labs     07/06/22  0446 07/07/22  0516 07/08/22  0447    144 144   K 4.2 3.4* 3.7    105 105   CO2 18* 24 28   PHOS 4.3 4.2 4.0   BUN 36* 32* 31*   CREATININE 2.0* 1.7* 1.7*     LIVER PROFILE:   No results for input(s): AST, ALT, LIPASE, BILIDIR, BILITOT, ALKPHOS in the last 72 hours. Invalid input(s): AMYLASE,  ALB    Microbiology:  6/17/2022 SARS-CoV-2 and influenza are negative  6/17/2022 MRSA DNA screen positive  6/17/2022 blood NG  6/17/2022 sputum NRF  6/18/2022 tracheal aspirate NRF  6/19/2022 BAL Candida  6/19/2022 respiratory viral panel negative  7/1/22 Blood cx NGTD  7/7 BAL       Imaging:  CT chest 7/3/22  images reviewed by me and showed:   1. Multifocal bronchopneumonia, with progression since 06/17/2022.   2. Small bilateral pleural effusions  3. Narrowing of the AP diameter of the trachea  4. Mild cardiomegaly        ASSESSMENT:  · Acute hypoxemic respiratory failure   · Aspiration pneumonia -recurrent  · Pulmonary congestion.   Large secretions with mucous plugs on bronchoscopy 7/7/2022  · Tracheobronchomalacia on bronchoscopy 7/7/2022 perhaps limiting airway clearance  · Was extubated and weaned to room air and home CPAP but aspirated again and returned to the ICU  · BETYS on BiPAP 22/15 with 1 L bleed in   · Parkinson's disease  · Atrial fibrillation  · Acute on chronic kidney failure, baseline creatinine 1.6  · Acute on chronic anemia, s/p 2 U PRBC 6/23/2022  · H/O esophagitis  · Pleural effusions are small bilaterally      PLAN:  Bipap for respiratory distress  Supplemental oxygen to maintain SaO2 >92%; wean as tolerated   NT suctioning  Mucomyst 3ML 20% inhalation BID  Vanc and Merrem D#8.  S/p 10 days cefepime & vancomycin  Fluconazole D#8 per IM  ID following  F/u BAL  Dysphagia diet   Nephrology following   Lopressor 50 BID   Blood sugar control ISS, with goal 150-180  DVT prophylaxis: Eliquis  DNI  Discussed with  at the bedside  Okay to move out of the ICU from our perspective

## 2022-07-08 NOTE — PROGRESS NOTES
Hospitalist PCU Progress Note      PCP: Oliva Ambrose MD    Date of Admission: 6/17/2022    Hospital Course:     admitted for resp failure from CHF , pna, and unable to manage own secretions with advanced parkinson's, eventually intubated    Prolonged vent support - there were discussions about trach and PEG support, though able to extubate eventually. 6/28-patient was extubated at 1:57 PM on 6/27/2022. She was then placed on BiPAP. She is getting periodic breaks from BiPAP. This morning she is on BiPAP and able to follow commands. O2 daytime and BiPAP HS. Secretions remain a major problem. Resp culture (BAL) growing candida - started antifungal.       Repeat emesis 7/1 and spiking high fever and worsening O2 requirement - she was transferred back to ICU on 7/1 overnight.     7/7- S.p bronch with clearance of mucous plugs today   S/p FEES by SLP team     Subjective:     MS Keith Fitch seen awake, alert and oriented.  Reports improving strength, tolerating diet ok today   Still with thick secretions and using Yanker suction         Remains on 4 L o2 , poor cough response noted        Medications:  Reviewed    Infusion Medications    sodium chloride      sodium chloride       Scheduled Medications    fluconazole  200 mg IntraVENous Q24H    sodium bicarbonate  650 mg Oral BID    acetylcysteine  600 mg Inhalation BID    metoprolol tartrate  50 mg Oral BID    ipratropium-albuterol  1 ampule Inhalation Q4H While awake    meropenem  1,000 mg IntraVENous Q12H    vancomycin (VANCOCIN) intermittent dosing (placeholder)   Other RX Placeholder    pantoprazole  40 mg IntraVENous BID    chlorhexidine  15 mL Mouth/Throat BID    sodium chloride flush  10 mL IntraVENous 2 times per day    apixaban  2.5 mg Oral BID    atorvastatin  40 mg Oral Nightly    carbidopa-levodopa  1 tablet Oral 4x Daily    DULoxetine  60 mg Oral Daily    lamoTRIgine  50 mg Oral BID    montelukast  10 mg Oral Daily    propafenone  150 mg Oral 3 times per day     PRN Meds: potassium chloride, magnesium sulfate, sodium phosphate IVPB **OR** sodium phosphate IVPB, morphine, carboxymethylcellulose PF, sodium chloride, sodium chloride flush, sodium chloride, promethazine **OR** ondansetron, acetaminophen **OR** acetaminophen, albuterol      Intake/Output Summary (Last 24 hours) at 7/8/2022 0845  Last data filed at 7/8/2022 0438  Gross per 24 hour   Intake 820.35 ml   Output 2120 ml   Net -1299.65 ml       Physical Exam Performed:    BP (!) 164/95   Pulse 79   Temp 97.8 °F (36.6 °C) (Bladder)   Resp 20   Ht 5' 6\" (1.676 m)   Wt 285 lb 7.9 oz (129.5 kg)   SpO2 91%   BMI 46.08 kg/m²            General: elderly female up in bed, more Awake, alert and better oriented. Appears to be not in any distress  Mucous Membranes:  Pink , anicteric  Left opaque cornea  Neck: No JVD, no carotid bruit, no thyromegaly  Chest: diminished wesly with poor inspiratory effort and cough response   Cardiovascular:  RRR S1S2 heard, no murmurs or gallops  Abdomen:  Soft, obese  undistended, non tender, no organomegaly, BS present  Extremities: diffuse edema of right UE 3+   Left UE picc noted  No edema or cyanosis. Distal pulses well felt  Neurological : grossly normal with gen weakness         Labs:   Recent Labs     07/06/22 0446 07/07/22  0516 07/08/22 0447   WBC 14.7* 15.6* 13.2*   HGB 8.8* 9.6* 9.4*   HCT 27.6* 30.0* 29.1*    308 316     Recent Labs     07/06/22  0446 07/07/22  0516 07/08/22 0447    144 144   K 4.2 3.4* 3.7    105 105   CO2 18* 24 28   BUN 36* 32* 31*   CREATININE 2.0* 1.7* 1.7*   CALCIUM 8.6 9.4 9.6   PHOS 4.3 4.2 4.0     No results for input(s): AST, ALT, BILIDIR, BILITOT, ALKPHOS in the last 72 hours. No results for input(s): INR in the last 72 hours. No results for input(s): [place] in the last 72 hours.     Urinalysis:      Lab Results   Component Value Date/Time    NITRU Negative 07/01/2022 10:58 PM    WBCUA 0-2 07/01/2022 10:58 PM    BACTERIA 2+ 06/22/2022 04:10 PM    RBCUA 3-4 07/01/2022 10:58 PM    BLOODU TRACE-LYSED 07/01/2022 10:58 PM    SPECGRAV 1.010 07/01/2022 10:58 PM    GLUCOSEU Negative 07/01/2022 10:58 PM       Radiology:  CT CHEST WO CONTRAST   Final Result   1. Multifocal bronchopneumonia, with progression since 06/17/2022.   2. Small bilateral pleural effusions. 3. Narrowing of the AP diameter of the trachea. 4. Mild cardiomegaly. XR CHEST PORTABLE   Final Result   Airspace disease within the mid to lower lung zones is slightly increased as   compared to prior. Possible small left pleural effusion. CT ABDOMEN PELVIS WO CONTRAST Additional Contrast? None   Final Result   1. Nonspecific, nonobstructive gaseous bowel pattern. 2. Extensive diverticulosis of the left colon, lacking acute inflammatory   changes. 3. Bibasilar consolidations, atelectasis versus pneumonia. Pleural effusions   have resolved since 06/23/2022. XR ABDOMEN (KUB) (SINGLE AP VIEW)   Final Result   1. Gaseous distention of the stomach, otherwise, unremarkable bowel gas   pattern. XR CHEST PORTABLE   Final Result   Stable chest.  Persistent left lower lobe opacification with probable   effusion. XR CHEST PORTABLE   Final Result   Persistent left lower lobe opacification and effusion. Interval clearing of   perihilar edema and right basilar opacification and effusion. XR CHEST PORTABLE   Final Result   No significant interval change in bilateral airspace disease and small   effusions. Unchanged positioning of support devices. XR CHEST PORTABLE   Final Result   Increasing basilar opacities with probable small effusions. Unchanged positioning of support devices. XR CHEST PORTABLE   Final Result   1. No significant change.          XR CHEST PORTABLE   Final Result   No significant interval change in basilar predominant airspace disease and   small left greater than right pleural effusions. Pulmonary vascular   congestion. CT ABDOMEN PELVIS WO CONTRAST Additional Contrast? None   Final Result   Colonic diverticulosis without acute diverticulitis. Nonspecific rectal wall   thickening. Partially visualized pleural effusions with adjacent compressive   atelectasis. XR CHEST PORTABLE   Final Result   Again identified is bibasilar airspace disease with small pleural effusions   and mild increased right perihilar infiltrates. This may be related to   atelectasis or pneumonia. Cardiomegaly. XR CHEST PORTABLE   Final Result   Stable examination with bilateral small effusions and bibasilar atelectasis. Stable ET tube and enteric tube. XR CHEST PORTABLE   Final Result   No acute process. Bibasilar hypoaeration      Stable cardiomegaly         IR PICC WO SQ PORT/PUMP > 5 YEARS   Final Result      XR CHEST PORTABLE   Final Result   Supportive tubing projects in stable position. Partial regression of atelectasis in the right lung base. Persisting   consolidation or or atelectasis in the left lung base. XR CHEST PORTABLE   Final Result   1. Endotracheal tube terminates in appropriate position above the nirali. 2.  Enteric tube terminates in the body of the stomach. 3.  Basilar opacities again demonstrated, right greater than left. Interval   improved aeration of the left lung base. XR CHEST PORTABLE   Final Result   Increasing opacity in the right base, lobar atelectasis versus accumulating   right pleural effusion. Combination of both entities or underlying pneumonia   also possible. Persisting left basilar airspace disease and small pleural effusion. XR CHEST PORTABLE   Final Result   Hazy bilateral lobe airspace opacities and small left pleural effusion. This   could represent atelectasis or pneumonia in the appropriate clinical setting.          CT CHEST WO CONTRAST   Final Result   1. Respiratory motion in the lower lungs. The right base has linear and   dependent atelectasis. The left does have atelectatic changes but could also   have some patchy opacities from pneumonia or aspiration at the left base. 2.  There is no pleural effusion or pneumothorax. 3.  Narrowing of the AP diameter of the trachea with bulging of the   membranous portion due to the gas distended but nondilated esophagus. XR CHEST PORTABLE   Final Result   1. Bibasilar heterogeneous opacities may represent subsegmental atelectasis   and/or mild pulmonary edema. Underlying infection is difficult to exclude. Short-term follow-up PA and lateral chest radiographs may be helpful for   further evaluation. 2.  Cardiomegaly. Assessment/Plan:    Principal Problem:    Septicemia ([address]  Active Problems:    Aspiration pneumonia of left lower lobe (HCC)    Mild protein-calorie malnutrition (HCC)    Chronic anemia    Thrush    MRSA colonization    BETSY (obstructive sleep apnea)    Metabolic acidosis    Pulmonary congestion    Mucus plugging of bronchi    Parkinson disease (HCC)    Chronic kidney disease    Acute kidney injury superimposed on CKD ([address]  Resolved Problems:    Shortness of breath    Acute respiratory failure with hypoxia (HCC)           Acute Hypox Resp Failure   Aspiration PNA   Treated with  IV Zosyn and vancomycin. Changed Zosyn to cefepime. DCd vancomycin 6/22. Finished 10 days of Vanco and cefepime  Intubated 6/18. Extubated on 6/27/2022.  -On BiPAP. Alternate BiPAP with nasal cannula oxygen.    - SLP involved. - resp culture from BAL is growing candida - started diflucan on 7/1/2022   - spiking high fever 7/1 - back on Abx with Vanc and merrem IV.    - now on 4 L o2 , weaning as able               - poor physical condition limiting clearance of resp secretions- sp repeat bronch with clearing of mucous today (7/7/22)        Sepsis POA - due to above. Recurrent with aspiration  Improving with abx        Dysphagia. Abnormal CT  Cough with every meal intake per family report. Underlying Parkinson's. I ordered for MBS and esophagram, though these will unlikely be done till she is better clinically. Failing bedside SLP   - continues to struggle with secretions. - back to NPO status.    - CT abd pelvis 7/2 - actually fairly unremarkable. - passed SLP and now tolerating diet - still risk for aspiration       Melena  GI bleed   drop in H/H   GI consulted   H&H held stable. Has since resumed eliquis  IV PPI BID        FARHEEN on CKD IIIB  Likely secondary to ATN in the setting of pneumonia, hypotension and elevated vancomycin levels  Had lasix gtt earlier this admission. Does not appear fluid overloaded now and with no PO intake - off diuretics. Will need IVF - reassess closely.        Parkinson's disease  on Sinemet.         Afib Paroxysmal   Rythmol   Resumed Eliquis.        HTN - BP on the low side. Nifedipine stopped due to this and also leg edema. Consider alternative when restarting.      DVT Prophylaxis: Eliquis  eliquis held for bloody secretions seen during bronch and melena - and has since been resumed. Diet: ADULT DIET; Dysphagia - Pureed; 3 carb choices (45 gm/meal); Low Sodium (2 gm); Mildly Thick (Nectar)  ADULT ORAL NUTRITION SUPPLEMENT; Dinner, Lunch; Frozen Oral Supplement  Code Status: Limited    Cc. Prognosis guarded. Updated  at bedside  Noted limited code now - DNI.        Cindy Moreira MD 7/8/2022 8:45 AM

## 2022-07-08 NOTE — PROGRESS NOTES
Inpatient Occupational Therapy Treatment    Unit: ICU  Date:  7/8/2022  Patient Name:    Kelly Chandra  Admitting diagnosis:  Shortness of breath [R06.02]  Septicemia [facility]) [A41.9]  Chronic anemia [D64.9]  Acute respiratory failure with hypoxia ([facility] [place] [address] [J96.01]  Aspiration pneumonia of left lower lobe, unspecified aspiration pneumonia type ([facility] [place] [address] [J69.0]  Chronic kidney disease, unspecified CKD stage [N18.9]  Admit Date:  6/17/2022  Precautions/Restrictions/WB Status/ Lines/ Wounds/ Oxygen: Fall risk, Lines -IV, Supplemental O2 (5), Zimmerman catheter and ., Telemetry, Continuous pulse oximetry and Isolation Precautions: Contact Blind left eye, Parkinson's on Sinemet. Pt transferred to ICU on 6-17-22 - Worsening shortness of breath and respiratory distress    PT intubated on 6-18-22 6-27 bipap, 6-28   Rapid response 7/1 transfer to ICU, Caren Pete OT orders 7/4    Treatment Time: 1261-9783  Treatment Number: 3  Timed code treatment minutes  27 minutes   Total Treatment minutes:  27  minutes    Patient Goals for Therapy:  \" Sit on the edge of bed  \"      Discharge Recommendations: SNF  DME needs for discharge: defer to facility       Therapy recommendations for staff:   Assist of 2 for bed mobility     History of Present Illness: per H&P on 6-17-22    80 y.o. female with a past medical history of atrial fibrillation on Eliquis, CKD, Parkinson's disease, diabetes, and sleep apnea who presents to the ED complaining of shortness of breath. The patient states that she has been short of breath since yesterday. She describes a cough, states that her throat hurts from coughing. She denies any sputum production. She denies fever body aches or chills. She does state that she has leg swelling however her  states it is chronic in nature and not worse than usual.  She denies chest pain. She does live at home with her . He states she does have frequent aspiration when eating.     history of a spinal fusion and a carotid endarterectomy   Home Health S4 Level Recommendation:  NA  AM-PAC Score: 9    Pain  No      Cognition    A&O Person, not otherwise addressed  Able to follow 2 step commands    Subjective  Patient lying supine in bed with spouse present. Pt agreeable to this OT tx. There Ex:  NA    Balance  Functional Sitting Balance:  Impaired, Min A -CGA sat for 5 minutes, then felt nauseous   Functional Standing Balance:NT     Bed mobility:    Supine to sit: Max A   Sit to supine:   Max A  and 2 persons  Rolling: Max A of 2  Scooting in sitting: Max A   Scooting to head of bed:   Total A x2    Bridging:   Not Tested    Transfers:  Unable to tolerate  Sit to stand:  Not Tested  Stand to sit:  Not Tested  Bed to chair:   Not Tested  Standard toilet: Not Tested  Bed to [facility]:  Not Tested    Dressing:      UE:   Not tested  LE:    Total assist    Bathing:    UE:  Not Tested  LE:  Not tested    Eating:   Not tested    Toileting:  Not tested     Activity Tolerance   Pt completed therapy session with Nausea noted with sitting at EOB   Supine:   O2 sats 92% on 4L O2  Sitting at EOB:  O2 sats: 88-93% on 4L O2 (coughing while sitting at EOB)  Supine after sitting:  O2 sats 87% on 4L O2, nursing notified and continued to monitor recovery    Positioning Needs:   Pt in bed positioned with pillows to promote edema control in B hands    Exercise / Activities Initiated:   Shoulder protraction/retraction in sitting x5    Patient/Family Education:   Role of OT    Assessment: Pt improved ability to perform supine to sit today with 1 person assist with HOB elevated. Pt able to tolerate sitting at EOB for 5 minutes due to \"not feeling well\".  Pt able to increase sitting balance this session to CGA  Recommending SNF upon discharge as patient functioning well below baseline, demonstrates good rehab potential and unable to return home due to limited or no family support, inability to negotiate stairs to enter home/bedroom/bathroom and home environment not conducive to patient recovery. Goal(s) : To be met in 3 Visits:  1). Bed to toilet/BSC: Max A  with appropriate AD     To be met in 5 Visits:  1). Supine to/from Sit:  Max A goal met 7/8 increase to Mod A   2). Upper Body Bathing:   Min A   3). Lower Body Bathing:   Max A   4). Upper Body Dressing:  Min A   5). Lower Body Dressing:  Max A   6).  Pt to demonstrate UE exs x 15 reps with minimal cues     Plan:  Continue with POC     Barby Ferraro, OTR/L [id number]        If patient discharges from this facility prior to next visit, this note will serve as the Discharge Summary

## 2022-07-08 NOTE — PROGRESS NOTES
Speech Language Pathology  Facility/Department: [facility] ICU  Dysphagia Daily Treatment Note      Recommendations: SLP recommends to continue with IDDSI 4 Puree Solids; IDDSI 2 Mildly Thick ([place]); Meds crushed in puree as able. Risk Management: upright for all intake, stay upright for at least 30 mins after intake, small bites/sips, assist feed, no straws, alternate bites/sips, slow rate of intake, general aspiration precautions and hold PO and contact SLP if s/s of aspiration or worsening respiratory status develop.     NAME: Faye Zhou  : 1940  MRN: [id number]    Patient Diagnosis(es):   Patient Active Problem List    Diagnosis Date Noted    Mucus plugging of bronchi     Pulmonary congestion     Thrush 2022    MRSA colonization 2022    BETSY (obstructive sleep apnea)     Metabolic acidosis     Chronic anemia     Mild protein-calorie malnutrition ([address] 2022    Aspiration pneumonia of left lower lobe ([address]     Sensorineural hearing loss, bilateral 2022    Laceration of left lower extremity     Pulmonary nodules     C. difficile colitis 10/07/2021    Chest pain 2021    Pulmonary HTN ([address]     Mitral valve insufficiency     Sialadenitis     Colonic pseudoobstruction     Acute kidney injury superimposed on CKD ([address]     Chronic kidney disease 2020    Partial symptomatic epilepsy with complex partial seizures, not intractable, without status epilepticus ([address]     Diastolic congestive heart failure ([address] 2019    COPD (chronic obstructive pulmonary disease) ([address] 12/10/2018    PAF (paroxysmal atrial fibrillation) ([address]     Septicemia ([address] 2018    Severe obstructive sleep apnea     Hepatic steatosis     Diverticulosis     Blind left eye     Parkinson disease ([address] 2018    CAD (coronary artery disease)     Morbid obesity with BMI of 40.0-44.9, adult ([address] 02/10/2017    Type 2 diabetes mellitus with stage 4 chronic kidney disease, without long-term current use of insulin ([facility] [address] 02/10/2017    Moderate persistent asthma 08/09/2016    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-sided sciatica 08/09/2016    Hypothyroidism 01/27/2015    Postural dizziness 02/20/2013    Anemia of chronic disease     GERD (gastroesophageal reflux disease)     Essential hypertension     Hyperlipidemia     Restless leg syndrome     L carotid artery stenosis s/p CEA 01/01/2002     Allergies: Allergies   Allergen Reactions    Levaquin [Levofloxacin]      Pt was given in er and developed hives and redness    Metoclopramide     Phenazopyridine     Pyridium [Phenazopyridine Hcl]     Reglan [Metoclopramide Hcl]     Reglan [Metoclopramide Hcl]      tremors       Subjective: Pt seen upright in bed, alert and agreeable to therapy, RN OK'd SLP entry and therapy, pt's  at bedside. Pain: no c/o pain    Current Diet: ADULT DIET; Dysphagia - Pureed; 3 carb choices (45 gm/meal); Low Sodium (2 gm); Mildly Thick (Nectar)  ADULT ORAL NUTRITION SUPPLEMENT; Dinner, Lunch; Frozen Oral Supplement    Diet Tolerance: Pt reports tolerating current diet with no clinical s/s of aspiration    Dysphagia Treatment and Impressions:   Pt seen in room at bedside with RN permission, OK'sulaiman SLP f/u and PO trials.  Chart Review/Interview: Pt s/p rapid response 7/1 with transfer to ICU. Dysphagia f/u attempted 7/2, held d/t respiratory status. Pt was downgraded to strict NPO per MD at that time. Pt cleared for oral diet and was seen by speech 07/04 with recs for puree solids, IDDSI 2 mildly thick (nectar) liquids with no straws, and meds crushed in puree as able. Upon SLP entry pt had a strong, dry, non-productive cough. She stated that she cannot get anything up. Oral care was completed as a dry, brown coating was noted on pt's tongue. Of note, pt's  stated she ate her entire bowl of cream of wheat this morning with no clinical s/s of aspiration. Per MD, pt noted overall improvement of pt's condition today.  FEES completed 6/30 with recommendations for a pureed diet with nectar/mildly thick liquids and crushable meds crushed in puree. Repeat FEES completed 07/07 with recommendations for a puree diet with nectar/mildly thick    Respiratory Status: Pt with SPO2% of 91-95% on 4 LPM HFNC with RR of 24-30/min. Weak (perceptually), congested, and productive cough at baseline. Yankauer at bedside. Oral care completed prior to PO trials.  Liquid PO Trials:    IDDSI 2 Mildly Thick (nectar):  Assessed via cup: no anterior bolus loss, suspect reduced/impaired A-P bolus transit, suspect premature bolus loss into pharynx (as noted on FEES), delayed, dry cough in 1/6 trials   Solid PO Trials   IDDSI 4 Puree:   no anterior bolus loss, suspect reduced/impaired A-P bolus transit, suspect premature bolus loss into pharynx (as noted on FEES), oral clearance grossly WFL, and no clinical s/s of aspiration.  Education: SLP edu pt re: Role of SLP, Rationale for dysphagia tx, Recommended compensatory strategies, Aspiration precautions, POC and FEES results. Pt verbalized understanding, would benefit from ongoing education and RN aware of recommendations   Assessment: Pt not tolerating recommended diet with no clinical s/s of aspiration. Some carryover of recommended compensatory strategies   Recommendations: SLP recommends to continue with IDDSI 4 Puree Solids; IDDSI 2 Mildly Thick ([place]); Meds crushed in puree as able.  Risk Management: upright for all intake, stay upright for at least 30 mins after intake, small bites/sips, assist feed, no straws, alternate bites/sips, slow rate of intake, general aspiration precautions and hold PO and contact SLP if s/s of aspiration or worsening respiratory status develop.     Dysphagia Goals:  Goal Extended Timeframe for Long-term Goals: 7 days (07/13/22)  Goal 1: Pt will tolerate recommended diet w/o exhibiting overt s/s dysphagia. 7/8/2022: Ongoing, progressing. Short-term Goals  Goal Extended Timeframe for Short-term Goals: 5 days (07/11/22)  1) The patient will tolerate repeat bedside swallowing evaluation when able. 7/8/2022: Goal met. 2) The patient will tolerate instrumental swallowing procedure. FEES completed 6/30 7/8/2022: Goal met, FEES completed 06/30/2022. 3) The patient will tolerate recommended diet without observed clinical signs of aspiration. 7/8/2022: Goal addressed, see above. Ongoing, not progressing. New Goal  4) The patient will tolerate repeat instrumental swallowing procedure. 7/8/2022: Goal met. FEES completed on 07/07/2022    Speech/Language/Cog Goals: N/A      Recommendations:  Solid Consistency: IDDSI 4 Puree Solids  Liquid Consistency: IDDSI 2 Mildly Thick (nectar) Liquids AFTER ORAL CARE  Medication: Meds crushed in puree as able     Patient/Family/Caregiver Education: Role of SLP, Rationale for dysphagia tx, Aspiration precautions and POC    Compensatory Strategies: Upright for all intake, Remain upright at least 30 mins after intake, No straws, Small bites, Small sips and Take pills crushed with applesauce/pudding    Plan:    Continued Dysphagia treatment with goals per plan of care. Discharge Recommendations: Continued ST recommended after d/c     If pt discharges from hospital prior to Speech/Swallowing discharge, this note serves as tx and discharge summary.      Total Treatment Time / Charges     Time in Time out Total Time / units   Cognitive Tx         Speech Tx      Dysphagia Tx 1116 1143 27 min / 1 unit     Signature:  [address]  Clinician

## 2022-07-08 NOTE — PROGRESS NOTES
Vancomycin Day: 8  Current Regimen: Pulse dosing    Patient's labs, cultures, vitals, and vancomycin regimen reviewed.    SCr stable  U/O adequate (> 0.5 to 1.0 mL/kg/hr)      Plan:   Vanc random today, 23.5, will hold today, random in am. Continue to pulse dose  Sonido Flores D 7/8/202211:21 AM  .

## 2022-07-08 NOTE — CARE COORDINATION
INTERDISCIPLINARY PLAN OF CARE CONFERENCE    Date/Time: 7/8/2022 9:58 AM  Completed by: Artie RODRIGUEZ, JENNIFER  Case Management      Patient Name:  Blake Omer  YOB: 1940  Admitting Diagnosis: Shortness of breath [R06.02]  Septicemia ([facility] [address] [A41.9]  Chronic anemia [D64.9]  Acute respiratory failure with hypoxia ([facility] [address] [J96.01]  Aspiration pneumonia of left lower lobe, unspecified aspiration pneumonia type ([facility] [address] [J69.0]  Chronic kidney disease, unspecified CKD stage [N18.9]     Admit Date/Time:  6/17/2022 12:50 AM    Chart reviewed. Interdisciplinary team contacted or reviewed plan related to patient progress and discharge plans. Disciplines included Case Management, Nursing, and Dietitian. Current Status:ongoing   PT/OT recommendation for discharge plan of care: SNF    Expected D/C Disposition:  Skilled nursing facility    Discharge Plan Comments: Chart review completed. Pt remains in the ICU. Message left for Yareli Redd at [facility] requesting a call back to get an update on the pending referral as they have been following along.      Aisha Garcia at the [facility] is also following and not able to make a determination at this time per voicemail on 7/6    Home O2 in place on admit: Yes    Addendum at 10:26am: Received call from Yareli Redd at [facility] who states she will pull the information to review again and let CM know if they are able to make a determination

## 2022-07-08 NOTE — PROGRESS NOTES
07/07/22 2332   NIV Type   Skin Protection for O2 Device Yes   Location Nose   NIV Started/Stopped On   Equipment Type v60   Mode Bilevel   Mask Type Full face mask   Mask Size Medium   Settings/Measurements   IPAP 16 cmH20   CPAP/EPAP 8 cmH2O   Rate Ordered 16   Resp 25   FiO2  50 %  (decreased to 40%)   I Time/ I Time % 0.9 s   Vt Exhaled 450 mL   Minute Volume 11.3 Liters   Mask Leak (lpm) 6 lpm   Comfort Level Good   Using Accessory Muscles No   SpO2 98   Alarm Settings   Alarms On Y   Low Pressure 8 cmH2O   High Pressure  35 cmH2O   Delay Alarm 20 sec(s)   RR Low  14   RR High 55 br/min   Oxygen Therapy/Pulse Ox   Heart Rate 73   SpO2 99 %

## 2022-07-08 NOTE — PROGRESS NOTES
Pt awake in bed. Assessment completed and medications given per MAR. VS as charted in flowsheet. Pt denies any fruther needs at this time. Call light in reach and bed in lowest position.

## 2022-07-08 NOTE — PROGRESS NOTES
07/08/22 0250   NIV Type   NIV Started/Stopped On   Equipment Type V60   Mode Bilevel   Mask Type Full face mask   Mask Size Medium   Settings/Measurements   PIP Observed 16 cm H20   IPAP 16 cmH20   CPAP/EPAP 8 cmH2O   Rate Ordered 16   Resp 21   Insp Rise Time (%) 2 %   FiO2  40 %   I Time/ I Time % 0.9 s   Vt Exhaled 521 mL   Minute Volume 11.2 Liters   Mask Leak (lpm) 0 lpm   Comfort Level Good   Using Accessory Muscles No   SpO2 98   Patient's Home Machine No   Oxygen Therapy/Pulse Ox   O2 Therapy Oxygen   O2 Device PAP (positive airway pressure)   Heart Rate 67   SpO2 97 %

## 2022-07-09 LAB
ANION GAP SERPL CALCULATED.3IONS-SCNC: 12 MMOL/L (ref 3–16)
BASOPHILS ABSOLUTE: 0.1 K/UL (ref 0–0.2)
BASOPHILS RELATIVE PERCENT: 0.4 %
BUN BLDV-MCNC: 29 MG/DL (ref 7–20)
CALCIUM SERPL-MCNC: 9.8 MG/DL (ref 8.3–10.6)
CHLORIDE BLD-SCNC: 103 MMOL/L (ref 99–110)
CO2: 28 MMOL/L (ref 21–32)
CREAT SERPL-MCNC: 1.6 MG/DL (ref 0.6–1.2)
CULTURE, RESPIRATORY: ABNORMAL
EOSINOPHILS ABSOLUTE: 0.8 K/UL (ref 0–0.6)
EOSINOPHILS RELATIVE PERCENT: 6.5 %
GFR AFRICAN AMERICAN: 38
GFR NON-AFRICAN AMERICAN: 31
GLUCOSE BLD-MCNC: 101 MG/DL (ref 70–99)
GLUCOSE BLD-MCNC: 116 MG/DL (ref 70–99)
GLUCOSE BLD-MCNC: 141 MG/DL (ref 70–99)
GRAM STAIN RESULT: ABNORMAL
GRAM STAIN RESULT: ABNORMAL
HCT VFR BLD CALC: 30 % (ref 36–48)
HEMOGLOBIN: 9.6 G/DL (ref 12–16)
LYMPHOCYTES ABSOLUTE: 1.3 K/UL (ref 1–5.1)
LYMPHOCYTES RELATIVE PERCENT: 10.2 %
MAGNESIUM: 1.7 MG/DL (ref 1.8–2.4)
MCH RBC QN AUTO: 27.6 PG (ref 26–34)
MCHC RBC AUTO-ENTMCNC: 32.2 G/DL (ref 31–36)
MCV RBC AUTO: 85.8 FL (ref 80–100)
MONOCYTES ABSOLUTE: 1.2 K/UL (ref 0–1.3)
MONOCYTES RELATIVE PERCENT: 9.5 %
NEUTROPHILS ABSOLUTE: 9 K/UL (ref 1.7–7.7)
NEUTROPHILS RELATIVE PERCENT: 73.4 %
ORGANISM: ABNORMAL
PDW BLD-RTO: 14.4 % (ref 12.4–15.4)
PERFORMED ON: ABNORMAL
PERFORMED ON: ABNORMAL
PHOSPHORUS: 3.9 MG/DL (ref 2.5–4.9)
PLATELET # BLD: 306 K/UL (ref 135–450)
PMV BLD AUTO: 8.5 FL (ref 5–10.5)
POTASSIUM SERPL-SCNC: 3.9 MMOL/L (ref 3.5–5.1)
RBC # BLD: 3.49 M/UL (ref 4–5.2)
SODIUM BLD-SCNC: 143 MMOL/L (ref 136–145)
VANCOMYCIN RANDOM: 21.3 UG/ML
WBC # BLD: 12.3 K/UL (ref 4–11)

## 2022-07-09 PROCEDURE — 99233 SBSQ HOSP IP/OBS HIGH 50: CPT | Performed by: INTERNAL MEDICINE

## 2022-07-09 PROCEDURE — 84100 ASSAY OF PHOSPHORUS: CPT

## 2022-07-09 PROCEDURE — 94669 MECHANICAL CHEST WALL OSCILL: CPT

## 2022-07-09 PROCEDURE — 6360000002 HC RX W HCPCS: Performed by: INTERNAL MEDICINE

## 2022-07-09 PROCEDURE — 6370000000 HC RX 637 (ALT 250 FOR IP): Performed by: INTERNAL MEDICINE

## 2022-07-09 PROCEDURE — 94660 CPAP INITIATION&MGMT: CPT

## 2022-07-09 PROCEDURE — 2580000003 HC RX 258: Performed by: INTERNAL MEDICINE

## 2022-07-09 PROCEDURE — 2700000000 HC OXYGEN THERAPY PER DAY

## 2022-07-09 PROCEDURE — 83735 ASSAY OF MAGNESIUM: CPT

## 2022-07-09 PROCEDURE — C9113 INJ PANTOPRAZOLE SODIUM, VIA: HCPCS | Performed by: INTERNAL MEDICINE

## 2022-07-09 PROCEDURE — 85025 COMPLETE CBC W/AUTO DIFF WBC: CPT

## 2022-07-09 PROCEDURE — 80202 ASSAY OF VANCOMYCIN: CPT

## 2022-07-09 PROCEDURE — 94640 AIRWAY INHALATION TREATMENT: CPT

## 2022-07-09 PROCEDURE — 92526 ORAL FUNCTION THERAPY: CPT

## 2022-07-09 PROCEDURE — 36415 COLL VENOUS BLD VENIPUNCTURE: CPT

## 2022-07-09 PROCEDURE — 80048 BASIC METABOLIC PNL TOTAL CA: CPT

## 2022-07-09 PROCEDURE — 2060000000 HC ICU INTERMEDIATE R&B

## 2022-07-09 PROCEDURE — 94761 N-INVAS EAR/PLS OXIMETRY MLT: CPT

## 2022-07-09 RX ORDER — MAGNESIUM SULFATE IN WATER 40 MG/ML
2000 INJECTION, SOLUTION INTRAVENOUS ONCE
Status: COMPLETED | OUTPATIENT
Start: 2022-07-09 | End: 2022-07-09

## 2022-07-09 RX ORDER — FUROSEMIDE 10 MG/ML
40 INJECTION INTRAMUSCULAR; INTRAVENOUS 2 TIMES DAILY
Status: DISCONTINUED | OUTPATIENT
Start: 2022-07-09 | End: 2022-07-12

## 2022-07-09 RX ADMIN — ACETYLCYSTEINE 600 MG: 200 INHALANT RESPIRATORY (INHALATION) at 20:04

## 2022-07-09 RX ADMIN — DULOXETINE HYDROCHLORIDE 60 MG: 60 CAPSULE, DELAYED RELEASE ORAL at 08:52

## 2022-07-09 RX ADMIN — MEROPENEM 1000 MG: 1 INJECTION, POWDER, FOR SOLUTION INTRAVENOUS at 17:53

## 2022-07-09 RX ADMIN — IPRATROPIUM BROMIDE AND ALBUTEROL SULFATE 1 AMPULE: 2.5; .5 SOLUTION RESPIRATORY (INHALATION) at 12:02

## 2022-07-09 RX ADMIN — APIXABAN 2.5 MG: 5 TABLET, FILM COATED ORAL at 08:52

## 2022-07-09 RX ADMIN — POTASSIUM BICARBONATE 20 MEQ: 391 TABLET, EFFERVESCENT ORAL at 08:51

## 2022-07-09 RX ADMIN — PROPAFENONE HYDROCHLORIDE 150 MG: 150 TABLET, FILM COATED ORAL at 05:14

## 2022-07-09 RX ADMIN — ACETYLCYSTEINE 600 MG: 200 INHALANT RESPIRATORY (INHALATION) at 08:45

## 2022-07-09 RX ADMIN — FUROSEMIDE 40 MG: 10 INJECTION, SOLUTION INTRAMUSCULAR; INTRAVENOUS at 08:53

## 2022-07-09 RX ADMIN — PANTOPRAZOLE SODIUM 40 MG: 40 INJECTION, POWDER, LYOPHILIZED, FOR SOLUTION INTRAVENOUS at 21:39

## 2022-07-09 RX ADMIN — IPRATROPIUM BROMIDE AND ALBUTEROL SULFATE 1 AMPULE: 2.5; .5 SOLUTION RESPIRATORY (INHALATION) at 23:03

## 2022-07-09 RX ADMIN — LAMOTRIGINE 50 MG: 25 TABLET ORAL at 08:52

## 2022-07-09 RX ADMIN — APIXABAN 2.5 MG: 5 TABLET, FILM COATED ORAL at 21:38

## 2022-07-09 RX ADMIN — LAMOTRIGINE 50 MG: 25 TABLET ORAL at 21:38

## 2022-07-09 RX ADMIN — SODIUM CHLORIDE 5 ML/HR: 9 INJECTION, SOLUTION INTRAVENOUS at 04:24

## 2022-07-09 RX ADMIN — MEROPENEM 1000 MG: 1 INJECTION, POWDER, FOR SOLUTION INTRAVENOUS at 05:06

## 2022-07-09 RX ADMIN — CARBIDOPA AND LEVODOPA 1 TABLET: 25; 100 TABLET ORAL at 17:46

## 2022-07-09 RX ADMIN — MONTELUKAST SODIUM 10 MG: 10 TABLET, COATED ORAL at 08:52

## 2022-07-09 RX ADMIN — IPRATROPIUM BROMIDE AND ALBUTEROL SULFATE 1 AMPULE: 2.5; .5 SOLUTION RESPIRATORY (INHALATION) at 08:45

## 2022-07-09 RX ADMIN — MAGNESIUM SULFATE HEPTAHYDRATE 2000 MG: 40 INJECTION, SOLUTION INTRAVENOUS at 12:23

## 2022-07-09 RX ADMIN — ATORVASTATIN CALCIUM 40 MG: 40 TABLET, FILM COATED ORAL at 21:38

## 2022-07-09 RX ADMIN — PANTOPRAZOLE SODIUM 40 MG: 40 INJECTION, POWDER, LYOPHILIZED, FOR SOLUTION INTRAVENOUS at 08:53

## 2022-07-09 RX ADMIN — CARBIDOPA AND LEVODOPA 1 TABLET: 25; 100 TABLET ORAL at 21:38

## 2022-07-09 RX ADMIN — Medication 15 ML: at 21:39

## 2022-07-09 RX ADMIN — CARBIDOPA AND LEVODOPA 1 TABLET: 25; 100 TABLET ORAL at 12:22

## 2022-07-09 RX ADMIN — PROPAFENONE HYDROCHLORIDE 150 MG: 150 TABLET, FILM COATED ORAL at 21:38

## 2022-07-09 RX ADMIN — SODIUM CHLORIDE, PRESERVATIVE FREE 10 ML: 5 INJECTION INTRAVENOUS at 08:54

## 2022-07-09 RX ADMIN — IPRATROPIUM BROMIDE AND ALBUTEROL SULFATE 1 AMPULE: 2.5; .5 SOLUTION RESPIRATORY (INHALATION) at 15:24

## 2022-07-09 RX ADMIN — FLUCONAZOLE 200 MG: 2 INJECTION, SOLUTION INTRAVENOUS at 09:21

## 2022-07-09 RX ADMIN — IPRATROPIUM BROMIDE AND ALBUTEROL SULFATE 1 AMPULE: 2.5; .5 SOLUTION RESPIRATORY (INHALATION) at 20:03

## 2022-07-09 RX ADMIN — SODIUM BICARBONATE 650 MG: 650 TABLET ORAL at 08:52

## 2022-07-09 RX ADMIN — PROPAFENONE HYDROCHLORIDE 150 MG: 150 TABLET, FILM COATED ORAL at 14:17

## 2022-07-09 RX ADMIN — FUROSEMIDE 40 MG: 10 INJECTION, SOLUTION INTRAMUSCULAR; INTRAVENOUS at 17:46

## 2022-07-09 RX ADMIN — METOPROLOL TARTRATE 50 MG: 50 TABLET, FILM COATED ORAL at 09:07

## 2022-07-09 RX ADMIN — CARBIDOPA AND LEVODOPA 1 TABLET: 25; 100 TABLET ORAL at 08:52

## 2022-07-09 RX ADMIN — METOPROLOL TARTRATE 50 MG: 50 TABLET, FILM COATED ORAL at 21:38

## 2022-07-09 NOTE — PROGRESS NOTES
Pt resting in bed at this time. No distress noted. Denies needs. All needs and call light within reach.

## 2022-07-09 NOTE — PROGRESS NOTES
Speech Language Pathology  Facility/Department: [facility] ICU  Dysphagia Daily Treatment Note        Recommendations:  Solid Consistency: IDDSI 4 Puree Solids  Liquid Consistency: IDDSI 2 Mildly Thick (nectar) Liquids / no straws  Medication: Meds crushed in puree as able   If pt has overt s/s of aspiration with PO intake or worsening respiratory status, recommend downgrade to strict NPO pending re-assessment by ST        NAME: Eh Slaughter  : 1940  MRN: [id number]     Patient Diagnosis(es):        Patient Active Problem List     Diagnosis Date Noted    Chronic anemia      Mild protein-calorie malnutrition ([address] 2022    Shortness of breath 2022    Aspiration pneumonia of left lower lobe (HCC)      Sensorineural hearing loss, bilateral 2022    Laceration of left lower extremity      Pulmonary nodules      C. difficile colitis 10/07/2021    Chest pain 2021    Pulmonary HTN ([address]      Mitral valve insufficiency      Sialadenitis      Colonic pseudoobstruction      Chronic kidney disease 2020    Partial symptomatic epilepsy with complex partial seizures, not intractable, without status epilepticus ([address]     Diastolic congestive heart failure ([address] 2019    Acute respiratory failure with hypoxia (HCC)      COPD (chronic obstructive pulmonary disease) ([address] 12/10/2018    PAF (paroxysmal atrial fibrillation) ([address]      Septicemia ([address] 2018    Severe obstructive sleep apnea      Hepatic steatosis      Diverticulosis      Blind left eye      Parkinson disease ([address] 2018    CAD (coronary artery disease)      Morbid obesity with BMI of 40.0-44.9, adult ([address] 02/10/2017    Type 2 diabetes mellitus with stage 4 chronic kidney disease, without long-term current use of insulin ([address] 02/10/2017    Moderate persistent asthma 2016    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-sided sciatica 2016    Hypothyroidism precautions, POC and FEES results. Provided written reminders for swallow strategies which pt and spouse read and stated their understanding. Explained rationale for strategies and precautions related to FEES results. Provided set up of suction toothbrush and explained importance of oral hygiene practice- RN to assist following passing her morning meds. Pt and spouse verbalized understanding, would benefit from ongoing education and RN aware of recommendations        Demonstrated preparation of mildly thick Diet Pepsi. · Assessment: Pt tolerating recommended diet with ongoing congested cough that is not clearly indicative of aspiration given recent FEES. Some carryover of recommended compensatory strategies  · Recommendations: SLP recommends to advance to IDDSI 4 Puree Solids; IDDSI 2 Mildly Thick (nectar) Liquids / no straws; Meds crushed in puree as able  · Risk Management: upright for all intake, stay upright for at least 30 mins after intake, small bites/sips, assist feed, no straws, alternate bites/sips, slow rate of intake, general aspiration precautions and hold PO and contact SLP if s/s of aspiration or worsening respiratory status develop. . If the patient exhibits s/s of aspiration and/or worsening respiratory status, hold PO and contact SLP.    Dysphagia Goals:  Timeframe for Long-term Goals: 7 days Goals extended to 7 days following FEES (7/7/22) to 7/12/22  Goal 1: Pt will tolerate recommended diet w/o exhibiting overt s/s dysphagia. 07/04: ongoing, initiate/rec PO diet this date  Short-term Goals  Timeframe for Short-term Goals: 5 days  Goals extended to 5 days following FEES (7/7/22) to 7/10/22      3) The patient will tolerate recommended diet without observed clinical signs of aspiration. 07/09: ongoing, progressing.   See above    New Goals:  4) The pt and caregiver will demonstrate use of aspiration precautions and compensatory swallow strategies with 90% acc given min cues: 7/9: ongoing, progressing, see above (small bites, small sips, double swallow, volitional throat clear every few swallows, no straw, diet texture modifications)     Speech/Language/Cog Goals: N/A        Recommendations:  Solid Consistency: IDDSI 4 Puree Solids  Liquid Consistency: IDDSI 2 Mildly Thick (nectar) Liquids / no straws  Medication: Meds crushed in puree as able   If pt has overt s/s of aspiration with PO intake or worsening respiratory status, recommend downgrade to strict NPO pending re-assessment by ST     Patient/Family/Caregiver Education: See above     Compensatory Strategies: See above     Plan:    Continued Dysphagia treatment with goals per plan of care.     Discharge Recommendations: Continued ST recommended at next level of care  If pt discharges from hospital prior to Speech/Swallowing discharge, this note serves as tx and discharge summary.      Total Treatment Time / Charges     Time in Time out Total Time / units   Cognitive Tx         Speech Tx         Dysphagia Tx 0820 0840 20 min/ 1 unit      Signature: Nikko Lorenz MS CCC-SLP  Speech Language Pathologist

## 2022-07-09 NOTE — PROGRESS NOTES
4 Eyes Skin Assessment     The patient is being assess for   {Blank single:57335::\"Admission\",\"Transfer to New Unit\",\"Post-Op Surgical\",\"Cath Lab Post-Op\",\"Shift Handoff\"}    I agree that 2 RN's have performed a thorough Head to Toe Skin Assessment on the patient. ALL assessment sites listed below have been assessed. Areas assessed for pressure by both nurses:   [x]   Head, Face, and Ears   [x]   Shoulders, Back, and Chest, Abdomen  [x]   Arms, Elbows, and Hands   [x]   Coccyx, Sacrum, and Ischium  [x]   Legs, Feet, and Heels      Scattered bruising. Excoriation to korey area and buttocks. Skin Assessed Under all Medical Devices by both nurses:  O2 device tubing              All Mepilex Borders were peeled back and area peeked at by both nurses:  No: NA  Please list where Mepilex Borders are located:  NA             **SHARE this note so that the co-signing nurse is able to place an eSignature**    Co-signer eSignature: {Esignature:[id number]}    Does the Patient have Skin Breakdown related to pressure?   NA          Vineet Prevention initiated:  NA   Wound Care Orders initiated:  NA      WOC nurse consulted for Pressure Injury (Stage 3,4, Unstageable, DTI, NWPT, Complex wounds)and New or Established Ostomies:  NA      Primary Nurse eSignature: {Esignature:[id number]}

## 2022-07-09 NOTE — PROGRESS NOTES
AM assessment completed, see flow sheet. Pt is alert and oriented. Vital signs are WNL. Respirations are even & easy at rest, but becomes dyspneic with exertion. Coughing up thick sputum and able to use yaunker to help get it out of throat. No complaints voiced. Pt denies needs at this time. SR up x 2, and bed in low position. Call light is within reach.

## 2022-07-09 NOTE — PROGRESS NOTES
bronchodilator orders with one order with TID Frequency and one order with Frequency of every 4 hours PRN wheezing or increased work of breathing using Per Protocol order mode. 11-13 - enter or revise RT Bronchodilator order(s) to one equivalent RT bronchodilator order with QID Frequency and an Albuterol order with Frequency of every 4 hours PRN wheezing or increased work of breathing using Per Protocol order mode. Greater than 13 - enter or revise RT Bronchodilator order(s) to one equivalent RT bronchodilator order with every 4 hours Frequency and an Albuterol order with Frequency of every 2 hours PRN wheezing or increased work of breathing using Per Protocol order mode. RT to enter RT Home Evaluation for COPD & MDI Assessment order using Per Protocol order mode.     Electronically signed by Allyson Ervin RCP on 7/9/2022 at 4:07 PM

## 2022-07-09 NOTE — PROGRESS NOTES
Vancomycin Day: 9 of 14  Current Regimen: Pulse dosing     Patient's labs, cultures, vitals, and vancomycin regimen reviewed. Plan:   Vanc level of 21.3. No dose today, will recheck a level with AM labs tomorrow.    Polo Real PharmD, [facility], 7/9/2022 5:48 AM

## 2022-07-09 NOTE — PROGRESS NOTES
07/09/22 0325   NIV Type   Equipment Type V60   Mode Bilevel   Mask Type Full face mask   Mask Size Medium   Settings/Measurements   IPAP 16 cmH20   CPAP/EPAP 8 cmH2O   Rate Ordered 16   Resp 25   FiO2  40 %   Vt Exhaled 446 mL   Mask Leak (lpm) 0 lpm   Comfort Level Good   Using Accessory Muscles No   SpO2 93   Patient's Home Machine No   Alarm Settings   Alarms On Y   Oxygen Therapy/Pulse Ox   Heart Rate 66   SpO2 94 %

## 2022-07-09 NOTE — PROGRESS NOTES
07/08/22 2306   NIV Type   Mode Bilevel   Mask Type Full face mask   Mask Size Medium   Settings/Measurements   IPAP 16 cmH20   CPAP/EPAP 8 cmH2O   Rate Ordered 16   Resp 28   FiO2  40 %   Vt Exhaled 410 mL   Comfort Level Good   SpO2 94

## 2022-07-09 NOTE — PROGRESS NOTES
07/08/22 2000   RT Protocol   History Pulmonary Disease 2   Respiratory pattern 2   Breath sounds 2   Cough 2   Indications for Bronchodilator Therapy On home bronchodilators   Bronchodilator Assessment Score 8   RT Inhaler-Nebulizer Bronchodilator Protocol Note    There is a bronchodilator order in the chart from a provider indicating to follow the RT Bronchodilator Protocol and there is an Initiate RT Inhaler-Nebulizer Bronchodilator Protocol order as well (see protocol at bottom of note). CXR Findings:  No results found. The findings from the last RT Protocol Assessment were as follows:   History Pulmonary Disease: Chronic pulmonary disease  Respiratory Pattern: Dyspnea on exertion or RR 21-25 bpm  Breath Sounds: Slightly diminished and/or crackles  Cough: Weak, productive  Indication for Bronchodilator Therapy: On home bronchodilators  Bronchodilator Assessment Score: 8    Aerosolized bronchodilator medication orders have been revised according to the RT Inhaler-Nebulizer Bronchodilator Protocol below. Respiratory Therapist to perform RT Therapy Protocol Assessment initially then follow the protocol. Repeat RT Therapy Protocol Assessment PRN for score 0-3 or on second treatment, BID, and PRN for scores above 3. No Indications - adjust the frequency to every 6 hours PRN wheezing or bronchospasm, if no treatments needed after 48 hours then discontinue using Per Protocol order mode. If indication present, adjust the RT bronchodilator orders based on the Bronchodilator Assessment Score as indicated below. Use Inhaler orders unless patient has one or more of the following: on home nebulizer, not able to hold breath for 10 seconds, is not alert and oriented, cannot activate and use MDI correctly, or respiratory rate 25 breaths per minute or more, then use the equivalent nebulizer order(s) with same Frequency and PRN reasons based on the score.   If a patient is on this medication at home then do not decrease Frequency below that used at home. 0-3 - enter or revise RT bronchodilator order(s) to equivalent RT Bronchodilator order with Frequency of every 4 hours PRN for wheezing or increased work of breathing using Per Protocol order mode. 4-6 - enter or revise RT Bronchodilator order(s) to two equivalent RT bronchodilator orders with one order with BID Frequency and one order with Frequency of every 4 hours PRN wheezing or increased work of breathing using Per Protocol order mode. 7-10 - enter or revise RT Bronchodilator order(s) to two equivalent RT bronchodilator orders with one order with TID Frequency and one order with Frequency of every 4 hours PRN wheezing or increased work of breathing using Per Protocol order mode. 11-13 - enter or revise RT Bronchodilator order(s) to one equivalent RT bronchodilator order with QID Frequency and an Albuterol order with Frequency of every 4 hours PRN wheezing or increased work of breathing using Per Protocol order mode. Greater than 13 - enter or revise RT Bronchodilator order(s) to one equivalent RT bronchodilator order with every 4 hours Frequency and an Albuterol order with Frequency of every 2 hours PRN wheezing or increased work of breathing using Per Protocol order mode.        Electronically signed by Guru Li RCP on 7/8/2022 at 8:45 PM

## 2022-07-09 NOTE — PROGRESS NOTES
[facility] Progress Note           Subjective/   80y.o. year old female who we are seeing in consultation for FARHEEN/CKD. HPI:  Patient was seen and examined. Kidney function at baseline. Has edema. Says breathing is better and she is hoping to get out of the hospital.  Lasix started    ROS: No fever or chills. + edema.   Good urine volume   Social:  No family at bedside     Objective/   GEN:  Chronically ill, BP (!) 140/68   Pulse 74   Temp 98.4 °F (36.9 °C) (Axillary)   Resp 23   Ht 5' 6\" (1.676 m)   Wt 285 lb 7.9 oz (129.5 kg)   SpO2 95%   BMI 46.08 kg/m²    From 7/4  HEENT: non-icteric, atraumatic  CV: S1, S2 without r/g; ++ LE edema  RESP: Decreased breath sounds bibasal  ABD: +bs, soft, nt, no hsm  SKIN: warm, no rashes    Data/  Recent Labs     07/07/22 0516 07/08/22 0447 07/09/22  0439   WBC 15.6* 13.2* 12.3*   HGB 9.6* 9.4* 9.6*   HCT 30.0* 29.1* 30.0*   MCV 85.9 85.6 85.8    316 306     Recent Labs     07/07/22 0516 07/08/22 0447 07/09/22  0439    144 143   K 3.4* 3.7 3.9    105 103   CO2 24 28 28   GLUCOSE 94 96 116*   PHOS 4.2 4.0 3.9   MG 2.10 1.90 1.70*   BUN 32* 31* 29*   CREATININE 1.7* 1.7* 1.6*   LABGLOM 29* 29* 31*   GFRAA 35* 35* 38*     Component      Latest Ref Rng & Units 6/22/2022 6/22/2022           4:10 PM  4:10 PM   Color, UA      Straw/Yellow Yellow    Clarity, UA      Clear Clear    Glucose, UA      Negative mg/dL Negative    Bilirubin, Urine      Negative Negative    Ketones, Urine      Negative mg/dL TRACE (A)    Specific Gravity, UA      1.005 - 1.030 1.025    Blood, Urine      Negative Negative    pH, UA      5.0 - 8.0 5.5    Protein, UA      Negative mg/dL TRACE (A)    Urobilinogen, Urine      <2.0 E.U./dL 0.2    Nitrite, Urine      Negative Negative    Leukocyte Esterase, Urine      Negative Negative    Microscopic Examination       YES    Urine Type       NotGiven    Mucus, UA      None Seen /LPF Rare (A)    WBC, UA 0 - 5 /HPF 10-20 (A)    RBC, UA      0 - 4 /HPF 3-4    Epithelial Cells, UA      0 - 5 /HPF 0-1    Bacteria, UA      None Seen /HPF 2+ (A)    Sodium, Ur      Not Established mmol/L  <20   Potassium, Ur      Not Established mmol/L  26.3   Chloride      Not Established mmol/L  <20       Assessment/     - Acute kidney injury - multi-factorial ATN injury in setting of aspiration PNA, hypotension, & elevated vancomycin levels. Kidney function has returned to baseline but slightly fluid overloaded which may dilute the creatinine value      - Chronic kidney disease stage 3b - background DM/HTN/CHF              SCr 1.6              Follows with [facility]     - Acute hypoxemic respiratory failure/Aspiration PNA/Pulmonary edema    Improving      - Diastolic CHF     - Atrial fibrillation    - Anemia - prn prbc's     - Hypertension   Range is ok     - Hypokalemia, improved. -Metabolic acidosis, resolved    -Dysphagia, patient has been started on some puréed food    Plan/     Increase lasix to 40 mg IV BID  Follow labs  K supplement   ____________________________________  Raf Hassan MD  [facility]  [email or web address]  Office: [phone]

## 2022-07-09 NOTE — PROGRESS NOTES
(placeholder)   Other RX Placeholder    pantoprazole  40 mg IntraVENous BID    chlorhexidine  15 mL Mouth/Throat BID    sodium chloride flush  10 mL IntraVENous 2 times per day    apixaban  2.5 mg Oral BID    atorvastatin  40 mg Oral Nightly    carbidopa-levodopa  1 tablet Oral 4x Daily    DULoxetine  60 mg Oral Daily    lamoTRIgine  50 mg Oral BID    montelukast  10 mg Oral Daily    propafenone  150 mg Oral 3 times per day       Data:  CBC:   Recent Labs     07/07/22  0516 07/08/22 0447 07/09/22  0439   WBC 15.6* 13.2* 12.3*   HGB 9.6* 9.4* 9.6*   HCT 30.0* 29.1* 30.0*   MCV 85.9 85.6 85.8    316 306     BMP:   Recent Labs     07/07/22  0516 07/08/22 0447 07/09/22 0439    144 143   K 3.4* 3.7 3.9    105 103   CO2 24 28 28   PHOS 4.2 4.0 3.9   BUN 32* 31* 29*   CREATININE 1.7* 1.7* 1.6*     LIVER PROFILE:   No results for input(s): AST, ALT, LIPASE, BILIDIR, BILITOT, ALKPHOS in the last 72 hours. Invalid input(s): AMYLASE,  ALB    Microbiology:  6/17/2022 SARS-CoV-2 and influenza are negative  6/17/2022 MRSA DNA screen positive  6/17/2022 blood NG  6/17/2022 sputum NRF  6/18/2022 tracheal aspirate NRF  6/19/2022 BAL Candida  6/19/2022 respiratory viral panel negative  7/1/22 Blood cx NGTD  7/7/2022 bronchial washings and BAL Saumya Bin CI      Imaging:  CT chest 7/3/22  images reviewed by me and showed:   1. Multifocal bronchopneumonia, with progression since 06/17/2022.   2. Small bilateral pleural effusions  3. Narrowing of the AP diameter of the trachea  4.  Mild cardiomegaly    ASSESSMENT:  · Acute hypoxemic respiratory failure, initially on vent, extubated but then returned to ICU and required BiPAP  · Aspiration pneumonia - recurrent  · Mucous plugging of bronchi on bronchoscopy 7/7/2022  · Tracheobronchomalacia on bronchoscopy 7/7/2022 -  perhaps limiting airway clearance  · BETSY on BiPAP 22/15 with 1 L bleed in   · Parkinson's disease  · Atrial fibrillation  · Acute on chronic kidney failure, baseline creatinine 1.6  · Acute on chronic anemia, s/p 2 U PRBC 6/23/2022  · H/O esophagitis  · Pleural effusions are small bilaterally    PLAN:  Bilevel non-invasive positive pressure ventilation for life threatening respiratory failure   NT suctioning as needed  Mucomyst 3 ML 20% inhalation BID  Vanc and Merrem D#9. Previously 10 days cefepime & vancomycin. Monitoring of vancomycin levels to prevent toxicity.    Fluconazole D#9 will be continued until off other IV antibiotics  ID following  F/u BAL  Dysphagia diet   Nephrology following   Lopressor 50 BID   Blood sugar control ISS, with goal 150-180  DVT prophylaxis: Eliquis  DNI  Okay to move out of the ICU from our perspective

## 2022-07-09 NOTE — PROGRESS NOTES
Hospitalist Progress Note      PCP: Mercy Bashir MD    Date of Admission: 6/17/2022    Hospital Course:     admitted for resp failure from CHF , pna, and unable to manage own secretions with advanced parkinson's, eventually intubated    Prolonged vent support - there were discussions about trach and PEG support, though able to extubate eventually. patient was extubated  on 6/27/2022. She was then placed on BiPAP. Continued on O2 daytime and BiPAP HS. Transferred out of ICU    Secretions remain a major problem. Resp culture (BAL) growing candida - started antifungal.       Repeat emesis 7/1 and spiking high fever and worsening O2 requirement - she was transferred back to ICU on 7/1 overnight.     7/7- S.p bronch with clearance of mucous plugs   S/p FEES by SLP team       Subjective:   7/9  MS Radha Tate seen. awake, alert and oriented.  Reports improving strength, tolerating diet ok today   Still with thick secretions and using Yanker suction     Remains on 3- 4 L o2 , poor cough response noted      Medications:  Reviewed    Infusion Medications    sodium chloride      sodium chloride 5 mL/hr (07/09/22 8184)     Scheduled Medications    furosemide  40 mg IntraVENous BID    sodium bicarbonate  650 mg Oral Daily    potassium bicarb-citric acid  20 mEq Oral Daily    fluconazole  200 mg IntraVENous Q24H    acetylcysteine  600 mg Inhalation BID    metoprolol tartrate  50 mg Oral BID    ipratropium-albuterol  1 ampule Inhalation Q4H While awake    meropenem  1,000 mg IntraVENous Q12H    vancomycin (VANCOCIN) intermittent dosing (placeholder)   Other RX Placeholder    pantoprazole  40 mg IntraVENous BID    chlorhexidine  15 mL Mouth/Throat BID    sodium chloride flush  10 mL IntraVENous 2 times per day    apixaban  2.5 mg Oral BID    atorvastatin  40 mg Oral Nightly    carbidopa-levodopa  1 tablet Oral 4x Daily    DULoxetine  60 mg Oral Daily    lamoTRIgine  50 mg Oral BID    montelukast  10 mg Oral Daily    propafenone  150 mg Oral 3 times per day     PRN Meds: potassium chloride, magnesium sulfate, sodium phosphate IVPB **OR** sodium phosphate IVPB, morphine, carboxymethylcellulose PF, sodium chloride, sodium chloride flush, sodium chloride, promethazine **OR** ondansetron, acetaminophen **OR** acetaminophen, albuterol      Intake/Output Summary (Last 24 hours) at 7/9/2022 1735  Last data filed at 7/9/2022 1430  Gross per 24 hour   Intake 717.21 ml   Output 1100 ml   Net -382.79 ml       Physical Exam Performed:    BP (!) 112/54   Pulse 77   Temp 98.6 °F (37 °C) (Axillary)   Resp 28   Ht 5' 6\" (1.676 m)   Wt 285 lb 7.9 oz (129.5 kg)   SpO2 92%   BMI 46.08 kg/m²       General: elderly female up in bed, more Awake, alert and better oriented. Appears to be not in any distress  Mucous Membranes:  Pink , anicteric  Left opaque cornea  Neck: No JVD, no carotid bruit, no thyromegaly  Chest: diminished wesly with poor inspiratory effort and cough response   Cardiovascular:  RRR S1S2 heard, no murmurs or gallops  Abdomen:  Soft, obese  undistended, non tender, no organomegaly, BS present  Extremities: diffuse edema of right UE 3+   Left UE picc noted  No edema or cyanosis. Distal pulses well felt  Neurological : grossly normal with gen weakness         Labs:   Recent Labs     07/07/22  0516 07/08/22 0447 07/09/22  0439   WBC 15.6* 13.2* 12.3*   HGB 9.6* 9.4* 9.6*   HCT 30.0* 29.1* 30.0*    316 306     Recent Labs     07/07/22  0516 07/08/22 0447 07/09/22  0439    144 143   K 3.4* 3.7 3.9    105 103   CO2 24 28 28   BUN 32* 31* 29*   CREATININE 1.7* 1.7* 1.6*   CALCIUM 9.4 9.6 9.8   PHOS 4.2 4.0 3.9     No results for input(s): AST, ALT, BILIDIR, BILITOT, ALKPHOS in the last 72 hours. No results for input(s): INR in the last 72 hours. No results for input(s): Curlie Lat in the last 72 hours.     Urinalysis:      Lab Results   Component Value Date/Time    NITRU Negative 07/01/2022 10:58 PM    WBCUA 0-2 07/01/2022 10:58 PM    BACTERIA 2+ 06/22/2022 04:10 PM    RBCUA 3-4 07/01/2022 10:58 PM    BLOODU TRACE-LYSED 07/01/2022 10:58 PM    SPECGRAV 1.010 07/01/2022 10:58 PM    GLUCOSEU Negative 07/01/2022 10:58 PM       Radiology:  CT CHEST WO CONTRAST   Final Result   1. Multifocal bronchopneumonia, with progression since 06/17/2022.   2. Small bilateral pleural effusions. 3. Narrowing of the AP diameter of the trachea. 4. Mild cardiomegaly. XR CHEST PORTABLE   Final Result   Airspace disease within the mid to lower lung zones is slightly increased as   compared to prior. Possible small left pleural effusion. CT ABDOMEN PELVIS WO CONTRAST Additional Contrast? None   Final Result   1. Nonspecific, nonobstructive gaseous bowel pattern. 2. Extensive diverticulosis of the left colon, lacking acute inflammatory   changes. 3. Bibasilar consolidations, atelectasis versus pneumonia. Pleural effusions   have resolved since 06/23/2022. XR ABDOMEN (KUB) (SINGLE AP VIEW)   Final Result   1. Gaseous distention of the stomach, otherwise, unremarkable bowel gas   pattern. XR CHEST PORTABLE   Final Result   Stable chest.  Persistent left lower lobe opacification with probable   effusion. XR CHEST PORTABLE   Final Result   Persistent left lower lobe opacification and effusion. Interval clearing of   perihilar edema and right basilar opacification and effusion. XR CHEST PORTABLE   Final Result   No significant interval change in bilateral airspace disease and small   effusions. Unchanged positioning of support devices. XR CHEST PORTABLE   Final Result   Increasing basilar opacities with probable small effusions. Unchanged positioning of support devices. XR CHEST PORTABLE   Final Result   1. No significant change.          XR CHEST PORTABLE   Final Result   No significant interval change in basilar predominant airspace CHEST WO CONTRAST   Final Result   1. Respiratory motion in the lower lungs. The right base has linear and   dependent atelectasis. The left does have atelectatic changes but could also   have some patchy opacities from pneumonia or aspiration at the left base. 2.  There is no pleural effusion or pneumothorax. 3.  Narrowing of the AP diameter of the trachea with bulging of the   membranous portion due to the gas distended but nondilated esophagus. XR CHEST PORTABLE   Final Result   1. Bibasilar heterogeneous opacities may represent subsegmental atelectasis   and/or mild pulmonary edema. Underlying infection is difficult to exclude. Short-term follow-up PA and lateral chest radiographs may be helpful for   further evaluation. 2.  Cardiomegaly. Assessment/Plan:    Principal Problem:    Septicemia ([address]  Active Problems:    Aspiration pneumonia of left lower lobe (HCC)    Mild protein-calorie malnutrition (HCC)    Chronic anemia    Thrush    MRSA colonization    BETSY (obstructive sleep apnea)    Metabolic acidosis    Pulmonary congestion    Mucus plugging of bronchi    Parkinson disease (HCC)    Chronic kidney disease    Acute kidney injury superimposed on CKD ([address]  Resolved Problems:    Shortness of breath    Acute respiratory failure with hypoxia (HCC)         Acute Hypox Resp Failure   Aspiration PNA   Treated with  IV Zosyn and vancomycin. Changed Zosyn to cefepime. DCd vancomycin 6/22. Finished 10 days of Vanco and cefepime  Intubated 6/18. Extubated on 6/27/2022.  -On BiPAP. Alternate BiPAP with nasal cannula oxygen.    - SLP involved.     - resp culture from BAL is growing candida - started diflucan on 7/1/2022   - spiking high fever 7/1 - back on Abx with Vanc and merrem IV.    - now on 4 L o2 , weaning as able               - poor physical condition limiting clearance of resp secretions- sp repeat bronch with clearing of mucous today (7/7/22)         Sepsis POA - due to above. Recurrent with aspiration  Improving with abx        Dysphagia. Abnormal CT  Cough with every meal intake per family report. Underlying Parkinson's. - continues to struggle with secretions. - CT abd pelvis 7/2 - actually fairly unremarkable. - passed SLP and now tolerating diet - still risk for aspiration       Melena  GI bleed  - drop in H/H   -GI consulted  -H&H held stable.  -Has since resumed eliquis  - IV PPI BID      FARHEEN on CKD IIIB  -Likely secondary to ATN in the setting of pneumonia, hypotension and elevated vancomycin levels  -Had lasix gtt earlier this admission.   -Does not appear fluid overloaded now and with no PO intake - off diuretics. Monitor BMP     Parkinson's disease   - on Sinemet.          Afib Paroxysmal   -Rythmol   -Resumed Eliquis.        HTN   - BP on the low side. Nifedipine stopped due to this and also leg edema. Consider alternative when restarting.      DVT Prophylaxis: Eliquis  eliquis held for bloody secretions seen during bronch and melena - and has since been resumed. Diet: ADULT DIET; Dysphagia - Pureed; 3 carb choices (45 gm/meal); Low Sodium (2 gm); Mildly Thick (Nectar)  ADULT ORAL NUTRITION SUPPLEMENT; Dinner, Lunch; Frozen Oral Supplement  Code Status: Limited    Cc. Prognosis guarded. Noted limited code now - DNI.      Transfer to U      Thom Camilo MD 7/9/2022 5:35 PM

## 2022-07-10 LAB
ALBUMIN SERPL-MCNC: 2.9 G/DL (ref 3.4–5)
ANION GAP SERPL CALCULATED.3IONS-SCNC: 12 MMOL/L (ref 3–16)
BASOPHILS ABSOLUTE: 0.1 K/UL (ref 0–0.2)
BASOPHILS RELATIVE PERCENT: 0.6 %
BUN BLDV-MCNC: 25 MG/DL (ref 7–20)
CALCIUM SERPL-MCNC: 10.2 MG/DL (ref 8.3–10.6)
CHLORIDE BLD-SCNC: 100 MMOL/L (ref 99–110)
CO2: 31 MMOL/L (ref 21–32)
CREAT SERPL-MCNC: 1.5 MG/DL (ref 0.6–1.2)
EOSINOPHILS ABSOLUTE: 0.7 K/UL (ref 0–0.6)
EOSINOPHILS RELATIVE PERCENT: 6.3 %
GFR AFRICAN AMERICAN: 40
GFR NON-AFRICAN AMERICAN: 33
GLUCOSE BLD-MCNC: 104 MG/DL (ref 70–99)
HCT VFR BLD CALC: 31.5 % (ref 36–48)
HEMOGLOBIN: 10 G/DL (ref 12–16)
LYMPHOCYTES ABSOLUTE: 1.5 K/UL (ref 1–5.1)
LYMPHOCYTES RELATIVE PERCENT: 14.1 %
MAGNESIUM: 2.1 MG/DL (ref 1.8–2.4)
MCH RBC QN AUTO: 27.4 PG (ref 26–34)
MCHC RBC AUTO-ENTMCNC: 31.9 G/DL (ref 31–36)
MCV RBC AUTO: 85.9 FL (ref 80–100)
MONOCYTES ABSOLUTE: 0.9 K/UL (ref 0–1.3)
MONOCYTES RELATIVE PERCENT: 8.7 %
NEUTROPHILS ABSOLUTE: 7.3 K/UL (ref 1.7–7.7)
NEUTROPHILS RELATIVE PERCENT: 70.3 %
PDW BLD-RTO: 14.1 % (ref 12.4–15.4)
PHOSPHORUS: 3.8 MG/DL (ref 2.5–4.9)
PLATELET # BLD: 338 K/UL (ref 135–450)
PMV BLD AUTO: 8.9 FL (ref 5–10.5)
POTASSIUM SERPL-SCNC: 4.1 MMOL/L (ref 3.5–5.1)
RBC # BLD: 3.67 M/UL (ref 4–5.2)
SODIUM BLD-SCNC: 143 MMOL/L (ref 136–145)
VANCOMYCIN RANDOM: 14 UG/ML
WBC # BLD: 10.4 K/UL (ref 4–11)

## 2022-07-10 PROCEDURE — 6360000002 HC RX W HCPCS: Performed by: INTERNAL MEDICINE

## 2022-07-10 PROCEDURE — 83735 ASSAY OF MAGNESIUM: CPT

## 2022-07-10 PROCEDURE — 80202 ASSAY OF VANCOMYCIN: CPT

## 2022-07-10 PROCEDURE — 2580000003 HC RX 258: Performed by: INTERNAL MEDICINE

## 2022-07-10 PROCEDURE — 80069 RENAL FUNCTION PANEL: CPT

## 2022-07-10 PROCEDURE — 6370000000 HC RX 637 (ALT 250 FOR IP): Performed by: INTERNAL MEDICINE

## 2022-07-10 PROCEDURE — 31720 CLEARANCE OF AIRWAYS: CPT

## 2022-07-10 PROCEDURE — 94761 N-INVAS EAR/PLS OXIMETRY MLT: CPT

## 2022-07-10 PROCEDURE — 94640 AIRWAY INHALATION TREATMENT: CPT

## 2022-07-10 PROCEDURE — 36415 COLL VENOUS BLD VENIPUNCTURE: CPT

## 2022-07-10 PROCEDURE — 94660 CPAP INITIATION&MGMT: CPT

## 2022-07-10 PROCEDURE — 85025 COMPLETE CBC W/AUTO DIFF WBC: CPT

## 2022-07-10 PROCEDURE — 99232 SBSQ HOSP IP/OBS MODERATE 35: CPT | Performed by: INTERNAL MEDICINE

## 2022-07-10 PROCEDURE — 2700000000 HC OXYGEN THERAPY PER DAY

## 2022-07-10 PROCEDURE — 2060000000 HC ICU INTERMEDIATE R&B

## 2022-07-10 PROCEDURE — 94669 MECHANICAL CHEST WALL OSCILL: CPT

## 2022-07-10 PROCEDURE — C9113 INJ PANTOPRAZOLE SODIUM, VIA: HCPCS | Performed by: INTERNAL MEDICINE

## 2022-07-10 RX ADMIN — APIXABAN 2.5 MG: 5 TABLET, FILM COATED ORAL at 09:23

## 2022-07-10 RX ADMIN — IPRATROPIUM BROMIDE AND ALBUTEROL SULFATE 1 AMPULE: 2.5; .5 SOLUTION RESPIRATORY (INHALATION) at 11:32

## 2022-07-10 RX ADMIN — PROPAFENONE HYDROCHLORIDE 150 MG: 150 TABLET, FILM COATED ORAL at 05:51

## 2022-07-10 RX ADMIN — ATORVASTATIN CALCIUM 40 MG: 40 TABLET, FILM COATED ORAL at 22:24

## 2022-07-10 RX ADMIN — PANTOPRAZOLE SODIUM 40 MG: 40 INJECTION, POWDER, LYOPHILIZED, FOR SOLUTION INTRAVENOUS at 22:23

## 2022-07-10 RX ADMIN — SODIUM BICARBONATE 650 MG: 650 TABLET ORAL at 09:22

## 2022-07-10 RX ADMIN — IPRATROPIUM BROMIDE AND ALBUTEROL SULFATE 1 AMPULE: 2.5; .5 SOLUTION RESPIRATORY (INHALATION) at 15:34

## 2022-07-10 RX ADMIN — MEROPENEM 1000 MG: 1 INJECTION, POWDER, FOR SOLUTION INTRAVENOUS at 05:51

## 2022-07-10 RX ADMIN — CARBIDOPA AND LEVODOPA 1 TABLET: 25; 100 TABLET ORAL at 22:24

## 2022-07-10 RX ADMIN — VANCOMYCIN HYDROCHLORIDE 750 MG: 750 INJECTION, POWDER, LYOPHILIZED, FOR SOLUTION INTRAVENOUS at 09:35

## 2022-07-10 RX ADMIN — APIXABAN 2.5 MG: 5 TABLET, FILM COATED ORAL at 22:24

## 2022-07-10 RX ADMIN — FUROSEMIDE 40 MG: 10 INJECTION, SOLUTION INTRAMUSCULAR; INTRAVENOUS at 09:22

## 2022-07-10 RX ADMIN — ACETYLCYSTEINE 600 MG: 200 INHALANT RESPIRATORY (INHALATION) at 07:58

## 2022-07-10 RX ADMIN — PROPAFENONE HYDROCHLORIDE 150 MG: 150 TABLET, FILM COATED ORAL at 14:15

## 2022-07-10 RX ADMIN — IPRATROPIUM BROMIDE AND ALBUTEROL SULFATE 1 AMPULE: 2.5; .5 SOLUTION RESPIRATORY (INHALATION) at 19:27

## 2022-07-10 RX ADMIN — MONTELUKAST SODIUM 10 MG: 10 TABLET, COATED ORAL at 09:23

## 2022-07-10 RX ADMIN — ACETYLCYSTEINE 600 MG: 200 INHALANT RESPIRATORY (INHALATION) at 19:27

## 2022-07-10 RX ADMIN — CARBIDOPA AND LEVODOPA 1 TABLET: 25; 100 TABLET ORAL at 14:15

## 2022-07-10 RX ADMIN — DULOXETINE HYDROCHLORIDE 60 MG: 60 CAPSULE, DELAYED RELEASE ORAL at 09:22

## 2022-07-10 RX ADMIN — LAMOTRIGINE 50 MG: 25 TABLET ORAL at 22:23

## 2022-07-10 RX ADMIN — METOPROLOL TARTRATE 50 MG: 50 TABLET, FILM COATED ORAL at 22:23

## 2022-07-10 RX ADMIN — IPRATROPIUM BROMIDE AND ALBUTEROL SULFATE 1 AMPULE: 2.5; .5 SOLUTION RESPIRATORY (INHALATION) at 22:48

## 2022-07-10 RX ADMIN — FUROSEMIDE 40 MG: 10 INJECTION, SOLUTION INTRAMUSCULAR; INTRAVENOUS at 18:07

## 2022-07-10 RX ADMIN — Medication 15 ML: at 09:22

## 2022-07-10 RX ADMIN — CARBIDOPA AND LEVODOPA 1 TABLET: 25; 100 TABLET ORAL at 09:23

## 2022-07-10 RX ADMIN — MEROPENEM 1000 MG: 1 INJECTION, POWDER, FOR SOLUTION INTRAVENOUS at 18:07

## 2022-07-10 RX ADMIN — PANTOPRAZOLE SODIUM 40 MG: 40 INJECTION, POWDER, LYOPHILIZED, FOR SOLUTION INTRAVENOUS at 09:24

## 2022-07-10 RX ADMIN — SODIUM CHLORIDE, PRESERVATIVE FREE 10 ML: 5 INJECTION INTRAVENOUS at 09:24

## 2022-07-10 RX ADMIN — IPRATROPIUM BROMIDE AND ALBUTEROL SULFATE 1 AMPULE: 2.5; .5 SOLUTION RESPIRATORY (INHALATION) at 07:56

## 2022-07-10 RX ADMIN — POTASSIUM BICARBONATE 20 MEQ: 391 TABLET, EFFERVESCENT ORAL at 09:22

## 2022-07-10 RX ADMIN — FLUCONAZOLE 200 MG: 2 INJECTION, SOLUTION INTRAVENOUS at 10:32

## 2022-07-10 RX ADMIN — CARBIDOPA AND LEVODOPA 1 TABLET: 25; 100 TABLET ORAL at 18:11

## 2022-07-10 RX ADMIN — PROPAFENONE HYDROCHLORIDE 150 MG: 150 TABLET, FILM COATED ORAL at 22:23

## 2022-07-10 RX ADMIN — METOPROLOL TARTRATE 50 MG: 50 TABLET, FILM COATED ORAL at 09:22

## 2022-07-10 RX ADMIN — SODIUM CHLORIDE, PRESERVATIVE FREE 10 ML: 5 INJECTION INTRAVENOUS at 22:25

## 2022-07-10 RX ADMIN — Medication 15 ML: at 22:24

## 2022-07-10 RX ADMIN — LAMOTRIGINE 50 MG: 25 TABLET ORAL at 09:22

## 2022-07-10 ASSESSMENT — PAIN SCALES - GENERAL: PAINLEVEL_OUTOF10: 0

## 2022-07-10 NOTE — PROGRESS NOTES
[facility] Progress Note           Subjective/   80y.o. year old female who we are seeing in consultation for FARHEEN/CKD. HPI:  Patient was seen and examined. Says breathing is better and that she has a dry mouth. Lasix increased yesterday. Better urine volume and kidney function stable     ROS: No fever or chills. + edema.   Good urine volume   Social:  No family at bedside     Objective/   GEN:  Chronically ill, BP (!) 144/65   Pulse 65   Temp 97.5 °F (36.4 °C) (Oral)   Resp 26   Ht 5' 6\" (1.676 m)   Wt 249 lb 3.2 oz (113 kg)   SpO2 94%   BMI 40.22 kg/m²    From 7/4  HEENT: non-icteric, atraumatic  CV: S1, S2 without r/g; + LE edema  RESP: Decreased breath sounds bibasal  ABD: +bs, soft, nt, no hsm  SKIN: warm, no rashes    Data/  Recent Labs     07/08/22  0447 07/09/22  0439 07/10/22  0435   WBC 13.2* 12.3* 10.4   HGB 9.4* 9.6* 10.0*   HCT 29.1* 30.0* 31.5*   MCV 85.6 85.8 85.9    306 338     Recent Labs     07/08/22  0447 07/09/22  0439 07/10/22  0435    143 143   K 3.7 3.9 4.1    103 100   CO2 28 28 31   GLUCOSE 96 116* 104*   PHOS 4.0 3.9 3.8   MG 1.90 1.70* 2.10   BUN 31* 29* 25*   CREATININE 1.7* 1.6* 1.5*   LABGLOM 29* 31* 33*   GFRAA 35* 38* 40*     Component      Latest Ref Rng & Units 6/22/2022 6/22/2022           4:10 PM  4:10 PM   Color, UA      Straw/Yellow Yellow    Clarity, UA      Clear Clear    Glucose, UA      Negative mg/dL Negative    Bilirubin, Urine      Negative Negative    Ketones, Urine      Negative mg/dL TRACE (A)    Specific Gravity, UA      1.005 - 1.030 1.025    Blood, Urine      Negative Negative    pH, UA      5.0 - 8.0 5.5    Protein, UA      Negative mg/dL TRACE (A)    Urobilinogen, Urine      <2.0 E.U./dL 0.2    Nitrite, Urine      Negative Negative    Leukocyte Esterase, Urine      Negative Negative    Microscopic Examination       YES    Urine Type       NotGiven    Mucus, UA      None Seen /LPF Rare (A)    WBC, UA      0 - 5 /HPF 10-20 (A)    RBC, UA      0 - 4 /HPF 3-4    Epithelial Cells, UA      0 - 5 /HPF 0-1    Bacteria, UA      None Seen /HPF 2+ (A)    Sodium, Ur      Not Established mmol/L  <20   Potassium, Ur      Not Established mmol/L  26.3   Chloride      Not Established mmol/L  <20       Assessment/     - Acute kidney injury - multi-factorial ATN injury in setting of aspiration PNA, hypotension, & elevated vancomycin levels. Kidney function has returned to baseline but slightly fluid overloaded which may dilute the creatinine value      - Chronic kidney disease stage 3b - background DM/HTN/CHF              SCr 1.6              Follows with [facility]     - Acute hypoxemic respiratory failure/Aspiration PNA/Pulmonary edema    Improving      - Diastolic CHF     - Atrial fibrillation    - Anemia - prn prbc's     - Hypertension   Range is ok     - Hypokalemia, improved. -Metabolic acidosis, resolved    -Dysphagia, patient has been started on some puréed food    Plan/     Continue lasix 40 mg IV BID - so far well tolerated   Follow labs    ____________________________________  Felicita Oconnor MD  [facility]  [email or web address]  Office: [phone]

## 2022-07-10 NOTE — PROGRESS NOTES
Pulmonary & Critical Care Medicine ICU Progress Note    CC: shortness of breath     Events of Last 24 hours:   BiPAP overnight  More upper airway secretions    Vascular lines: IV: PICC 6/19/2022 P      Intake/Output Summary (Last 24 hours) at 7/10/2022 1257  Last data filed at 7/10/2022 0747  Gross per 24 hour   Intake 528.94 ml   Output 750 ml   Net -221.06 ml         MV: 6/18/2022 -6/27/22  Vent Mode: AC/VC Resp Rate (Set): 0 bmp/Vt (Set, mL): 0 mL/ /FiO2 : 45 %  No results for input(s): PHART, DYQ2NTT, PO2ART in the last 72 hours. IV:   sodium chloride      sodium chloride 5 mL/hr at 07/10/22 0747       Vitals:  Blood pressure 120/62, pulse 65, temperature 98.1 °F (36.7 °C), temperature source Oral, resp. rate 18, height 5' 6\" (1.676 m), weight 249 lb 3.2 oz (113 kg), SpO2 92 %, not currently breastfeeding. On  5 L  Constitutional:  No acute distress. HENT:  Oropharynx is clear and moist.   Neck: No tracheal deviation present. Cardiovascular: Normal heart sounds. No lower extremity edema. Pulmonary/Chest: No wheezes. + rhonchi. No rales. No decreased breath sounds. No accessory muscle usage or stridor. Musculoskeletal: No cyanosis. No clubbing. Skin: Skin is warm and dry. Psychiatric: Normal mood and affect.   Neurologic: speech fluent, alert and oriented, strength symmetric          Scheduled Meds:   furosemide  40 mg IntraVENous BID    sodium bicarbonate  650 mg Oral Daily    potassium bicarb-citric acid  20 mEq Oral Daily    fluconazole  200 mg IntraVENous Q24H    acetylcysteine  600 mg Inhalation BID    metoprolol tartrate  50 mg Oral BID    ipratropium-albuterol  1 ampule Inhalation Q4H While awake    meropenem  1,000 mg IntraVENous Q12H    vancomycin (VANCOCIN) intermittent dosing (placeholder)   Other RX Placeholder    pantoprazole  40 mg IntraVENous BID    chlorhexidine  15 mL Mouth/Throat BID    sodium chloride flush  10 mL IntraVENous 2 times per day    apixaban  2.5 mg pressure ventilation HS  Mucomyst 3 ML 20% inhalation BID  Metaneb  NT suctioning as needed   Vanc and Merrem D#10. Previously 10 days cefepime & vancomycin. Monitoring of vancomycin levels to prevent toxicity.    Fluconazole D#10 will be continued until off other IV antibiotics  ID following  Nephrology following, managing diuretics  On Eliquis  DNI

## 2022-07-10 NOTE — PROGRESS NOTES
Patient assessed and AM medications given crushed in Applesauce. Patient reports feeling tired today. Educated on wearing BiPap during nap. Patient refused. Patient increased to 5L O2. Call light within reach.

## 2022-07-10 NOTE — PROGRESS NOTES
Patient refused dinner tray. PCA left tray on table. Patient would like it reheated after she rests. VapoTherm seems to be therapeutic at this time allowing patient to sleep.

## 2022-07-10 NOTE — PROGRESS NOTES
Vancomycin Day: 10 of 14  Current Regimen: Pulse dosing     Patient's labs, cultures, vitals, and vancomycin regimen reviewed. Plan: Will dose with 750 mg x1 today and recheck a level with AM labs tomorrow.      Kari Martins PharmD, [facility], 7/10/2022 7:23 AM

## 2022-07-10 NOTE — PROGRESS NOTES
PM assessment completed. Scheduled medications given per MAR. VSS 4 liter, A/O to self, place, and situation although hard to understand at times. Patient  denies any needs at this time. Call light in reach, will monitor, bed alarm on.

## 2022-07-10 NOTE — PROGRESS NOTES
RT Inhaler-Nebulizer Bronchodilator Protocol Note    There is a bronchodilator order in the chart from a provider indicating to follow the RT Bronchodilator Protocol and there is an Initiate RT Inhaler-Nebulizer Bronchodilator Protocol order as well (see protocol at bottom of note). CXR Findings:  No results found. The findings from the last RT Protocol Assessment were as follows:   History Pulmonary Disease: Chronic pulmonary disease  Respiratory Pattern: Dyspnea on exertion or RR 21-25 bpm  Breath Sounds: Inspiratory and expiratory or bilateral wheezing and/or rhonchi  Cough: Weak, productive  Indication for Bronchodilator Therapy: On home bronchodilators  Bronchodilator Assessment Score: 12    Aerosolized bronchodilator medication orders have been revised according to the RT Inhaler-Nebulizer Bronchodilator Protocol below. Respiratory Therapist to perform RT Therapy Protocol Assessment initially then follow the protocol. Repeat RT Therapy Protocol Assessment PRN for score 0-3 or on second treatment, BID, and PRN for scores above 3. No Indications - adjust the frequency to every 6 hours PRN wheezing or bronchospasm, if no treatments needed after 48 hours then discontinue using Per Protocol order mode. If indication present, adjust the RT bronchodilator orders based on the Bronchodilator Assessment Score as indicated below. Use Inhaler orders unless patient has one or more of the following: on home nebulizer, not able to hold breath for 10 seconds, is not alert and oriented, cannot activate and use MDI correctly, or respiratory rate 25 breaths per minute or more, then use the equivalent nebulizer order(s) with same Frequency and PRN reasons based on the score. If a patient is on this medication at home then do not decrease Frequency below that used at home.     0-3 - enter or revise RT bronchodilator order(s) to equivalent RT Bronchodilator order with Frequency of every 4 hours PRN for wheezing or increased work of breathing using Per Protocol order mode. 4-6 - enter or revise RT Bronchodilator order(s) to two equivalent RT bronchodilator orders with one order with BID Frequency and one order with Frequency of every 4 hours PRN wheezing or increased work of breathing using Per Protocol order mode. 7-10 - enter or revise RT Bronchodilator order(s) to two equivalent RT bronchodilator orders with one order with TID Frequency and one order with Frequency of every 4 hours PRN wheezing or increased work of breathing using Per Protocol order mode. 11-13 - enter or revise RT Bronchodilator order(s) to one equivalent RT bronchodilator order with QID Frequency and an Albuterol order with Frequency of every 4 hours PRN wheezing or increased work of breathing using Per Protocol order mode. Greater than 13 - enter or revise RT Bronchodilator order(s) to one equivalent RT bronchodilator order with every 4 hours Frequency and an Albuterol order with Frequency of every 2 hours PRN wheezing or increased work of breathing using Per Protocol order mode. PATIENT WILL BENEFIT FROM TREATMENTS.      Electronically signed by Bhakti Nuno RCP on 7/10/2022 at 7:41 PM

## 2022-07-10 NOTE — PROGRESS NOTES
07/09/22 2312   NIV Type   Skin Assessment Clean, dry, & intact   Skin Protection for O2 Device Yes   NIV Started/Stopped On   Equipment Type V60   Mode Bilevel   Mask Type Full face mask   Mask Size Medium   Settings/Measurements   IPAP 16 cmH20   CPAP/EPAP 8 cmH2O   Rate Ordered 16   Resp 24   FiO2  45 %   Vt Exhaled 486 mL   Mask Leak (lpm) 16 lpm   Comfort Level Good   Using Accessory Muscles No   SpO2 94   Patient's Home Machine No   Alarm Settings   Alarms On Y

## 2022-07-10 NOTE — PROGRESS NOTES
RT Inhaler-Nebulizer Bronchodilator Protocol Note    There is a bronchodilator order in the chart from a provider indicating to follow the RT Bronchodilator Protocol and there is an Initiate RT Inhaler-Nebulizer Bronchodilator Protocol order as well (see protocol at bottom of note). CXR Findings:  No results found. The findings from the last RT Protocol Assessment were as follows:   History Pulmonary Disease: (P) Chronic pulmonary disease  Respiratory Pattern: (P) Dyspnea on exertion or RR 21-25 bpm  Breath Sounds: (P) Inspiratory and expiratory or bilateral wheezing and/or rhonchi  Cough: (P) Weak, productive  Indication for Bronchodilator Therapy: (P) On home bronchodilators  Bronchodilator Assessment Score: (P) 12    Aerosolized bronchodilator medication orders have been revised according to the RT Inhaler-Nebulizer Bronchodilator Protocol below. Respiratory Therapist to perform RT Therapy Protocol Assessment initially then follow the protocol. Repeat RT Therapy Protocol Assessment PRN for score 0-3 or on second treatment, BID, and PRN for scores above 3. No Indications - adjust the frequency to every 6 hours PRN wheezing or bronchospasm, if no treatments needed after 48 hours then discontinue using Per Protocol order mode. If indication present, adjust the RT bronchodilator orders based on the Bronchodilator Assessment Score as indicated below. Use Inhaler orders unless patient has one or more of the following: on home nebulizer, not able to hold breath for 10 seconds, is not alert and oriented, cannot activate and use MDI correctly, or respiratory rate 25 breaths per minute or more, then use the equivalent nebulizer order(s) with same Frequency and PRN reasons based on the score. If a patient is on this medication at home then do not decrease Frequency below that used at home.     0-3 - enter or revise RT bronchodilator order(s) to equivalent RT Bronchodilator order with Frequency of every 4 hours PRN for wheezing or increased work of breathing using Per Protocol order mode. 4-6 - enter or revise RT Bronchodilator order(s) to two equivalent RT bronchodilator orders with one order with BID Frequency and one order with Frequency of every 4 hours PRN wheezing or increased work of breathing using Per Protocol order mode. 7-10 - enter or revise RT Bronchodilator order(s) to two equivalent RT bronchodilator orders with one order with TID Frequency and one order with Frequency of every 4 hours PRN wheezing or increased work of breathing using Per Protocol order mode. 11-13 - enter or revise RT Bronchodilator order(s) to one equivalent RT bronchodilator order with QID Frequency and an Albuterol order with Frequency of every 4 hours PRN wheezing or increased work of breathing using Per Protocol order mode. Greater than 13 - enter or revise RT Bronchodilator order(s) to one equivalent RT bronchodilator order with every 4 hours Frequency and an Albuterol order with Frequency of every 2 hours PRN wheezing or increased work of breathing using Per Protocol order mode.          Electronically signed by Capo Peterson RCP on 7/10/2022 at 8:13 AM

## 2022-07-10 NOTE — PROGRESS NOTES
07/10/22 0302   NIV Type   Equipment Type V60   Mode Bilevel   Mask Type Full face mask   Mask Size Medium   Settings/Measurements   IPAP 16 cmH20   CPAP/EPAP 8 cmH2O   Rate Ordered 16   Resp 26   FiO2  45 %   Vt Exhaled 461 mL   Mask Leak (lpm) 5 lpm   Comfort Level Good   Using Accessory Muscles No   SpO2 96   Patient's Home Machine No   Alarm Settings   Alarms On Y

## 2022-07-10 NOTE — PROGRESS NOTES
Physical Therapy  Hold per RN, who reports that patient is fatigued, congested and fio2 increased to 5 l . Will continue to follow.     Madhavi Cook, PT #[id number]  No charge @15:46

## 2022-07-10 NOTE — PROGRESS NOTES
Hospitalist Progress Note      PCP: Jyotsna Jackson MD    Date of Admission: 6/17/2022    Hospital Course:   Admitted for resp failure from CHF , pna, and unable to manage own secretions with advanced parkinson's, eventually intubated    Prolonged vent support - there were discussions about trach and PEG support, though able to extubate eventually. Patient was extubated on 6/27/2022. She was then placed on BiPAP. Continued on O2 daytime and BiPAP HS. Transferred out of ICU    Secretions remain a major problem. Resp culture (BAL) growing candida - started antifungal.     Repeat emesis 7/1 and spiking high fever and worsening O2 requirement - she was transferred back to ICU on 7/1 overnight. Continued on BiPAP    7/7- S.p bronch with clearance of mucous plugs   S/p FEES by SLP team    Transferred out of ICU to PCU on 7/9      Subjective:   7/10  Transfer to PCU last night. .  She still looks very fatigued and exhausted. Oxygen saturation stable on 5 L.   Decreasing secretions       Medications:  Reviewed    Infusion Medications    sodium chloride      sodium chloride 5 mL/hr at 07/10/22 0747     Scheduled Medications    furosemide  40 mg IntraVENous BID    sodium bicarbonate  650 mg Oral Daily    potassium bicarb-citric acid  20 mEq Oral Daily    fluconazole  200 mg IntraVENous Q24H    acetylcysteine  600 mg Inhalation BID    metoprolol tartrate  50 mg Oral BID    ipratropium-albuterol  1 ampule Inhalation Q4H While awake    meropenem  1,000 mg IntraVENous Q12H    vancomycin (VANCOCIN) intermittent dosing (placeholder)   Other RX Placeholder    pantoprazole  40 mg IntraVENous BID    chlorhexidine  15 mL Mouth/Throat BID    sodium chloride flush  10 mL IntraVENous 2 times per day    apixaban  2.5 mg Oral BID    atorvastatin  40 mg Oral Nightly    carbidopa-levodopa  1 tablet Oral 4x Daily    DULoxetine  60 mg Oral Daily    lamoTRIgine  50 mg Oral BID    montelukast  10 mg Oral Daily    propafenone  150 mg Oral 3 times per day     PRN Meds: potassium chloride, magnesium sulfate, sodium phosphate IVPB **OR** sodium phosphate IVPB, morphine, carboxymethylcellulose PF, sodium chloride, sodium chloride flush, sodium chloride, promethazine **OR** ondansetron, acetaminophen **OR** acetaminophen, albuterol      Intake/Output Summary (Last 24 hours) at 7/10/2022 1238  Last data filed at 7/10/2022 0747  Gross per 24 hour   Intake 528.94 ml   Output 750 ml   Net -221.06 ml       Physical Exam Performed:    /62   Pulse 65   Temp 98.1 °F (36.7 °C) (Oral)   Resp 18   Ht 5' 6\" (1.676 m)   Wt 249 lb 3.2 oz (113 kg)   SpO2 92%   BMI 40.22 kg/m²     General: elderly female. Looks very fatigued. Oriented. Appears to be not in any distress  Mucous Membranes:  Pink , anicteric  Left opaque cornea  Neck: No JVD, no carotid bruit, no thyromegaly  Chest: Diminished breath sounds with mild rhonchi . Cardiovascular:  RRR S1S2 heard, no murmurs or gallops  Abdomen:  Soft, obese  undistended, non tender, no organomegaly, BS present  Extremities: Left UE picc noted  No edema or cyanosis. Distal pulses well felt  Neurological : grossly normal with gen weakness         Labs:   Recent Labs     07/08/22 0447 07/09/22  0439 07/10/22  0435   WBC 13.2* 12.3* 10.4   HGB 9.4* 9.6* 10.0*   HCT 29.1* 30.0* 31.5*    306 338     Recent Labs     07/08/22 0447 07/09/22  0439 07/10/22  0435    143 143   K 3.7 3.9 4.1    103 100   CO2 28 28 31   BUN 31* 29* 25*   CREATININE 1.7* 1.6* 1.5*   CALCIUM 9.6 9.8 10.2   PHOS 4.0 3.9 3.8     No results for input(s): AST, ALT, BILIDIR, BILITOT, ALKPHOS in the last 72 hours. No results for input(s): INR in the last 72 hours. No results for input(s): Jaya Patel in the last 72 hours.     Urinalysis:      Lab Results   Component Value Date/Time    NITRU Negative 07/01/2022 10:58 PM    WBCUA 0-2 07/01/2022 10:58 PM    BACTERIA 2+ 06/22/2022 04:10 PM linear and   dependent atelectasis. The left does have atelectatic changes but could also   have some patchy opacities from pneumonia or aspiration at the left base. 2.  There is no pleural effusion or pneumothorax. 3.  Narrowing of the AP diameter of the trachea with bulging of the   membranous portion due to the gas distended but nondilated esophagus. XR CHEST PORTABLE   Final Result   1. Bibasilar heterogeneous opacities may represent subsegmental atelectasis   and/or mild pulmonary edema. Underlying infection is difficult to exclude. Short-term follow-up PA and lateral chest radiographs may be helpful for   further evaluation. 2.  Cardiomegaly. Assessment/Plan:    Principal Problem:    Septicemia ([address]  Active Problems:    Aspiration pneumonia of left lower lobe (HCC)    Mild protein-calorie malnutrition (HCC)    Chronic anemia    Thrush    MRSA colonization    BETSY (obstructive sleep apnea)    Metabolic acidosis    Pulmonary congestion    Mucus plugging of bronchi    Atrial fibrillation, chronic (HCC)    Parkinson disease (HCC)    Chronic kidney disease    Acute kidney injury superimposed on CKD ([address]  Resolved Problems:    Shortness of breath    Acute respiratory failure with hypoxia (HCC)         Acute Hypox Resp Failure   Aspiration PNA   Treated with  IV Zosyn and vancomycin. Changed Zosyn to cefepime. DCd vancomycin 6/22. Finished 10 days of Vanco and cefepime  Intubated 6/18. Extubated on 6/27/2022.  -On BiPAP. Alternate BiPAP with nasal cannula oxygen.    - SLP involved. - resp culture from BAL is growing candida - started diflucan on 7/1/2022   - spiking high fever 7/1 - back on Abx with Vanc and merrem IV.    - now on 4 L-> 5L o2 , weaning as able               - poor physical condition limiting clearance of resp secretions- sp repeat bronch with clearing of mucous on 7/7/22 . On inhaled Mucomyst     Sepsis POA - due to above.    Recurrent with aspiration  Improving with abx     Dysphagia. Abnormal CT  Cough with every meal intake per family report. Underlying Parkinson's. - continues to struggle with secretions. - CT abd pelvis 7/2 - actually fairly unremarkable. - passed SLP and now tolerating diet - still risk for aspiration     Melena  GI bleed  - drop in H/H   -GI consulted  -H&H held stable.  -Has since resumed eliquis  - IV PPI BID      FARHEEN on CKD IIIB  -Likely secondary to ATN in the setting of pneumonia, hypotension and elevated vancomycin levels  -Had lasix gtt earlier this admission.  -Back on IV Lasix now, creatinine stable  Monitor BMP     Parkinson's disease   - on Sinemet. Afib Paroxysmal   -Rythmol   -Resumed Eliquis. HTN   - BP on the low side. Nifedipine stopped due to this and also leg edema. Consider alternative when restarting.     Generalized weakness and debility  -Consult PT OT     DVT Prophylaxis: Eliquis  eliquis held for bloody secretions seen during bronch and melena - and has since been resumed. Diet: ADULT DIET; Dysphagia - Pureed; 3 carb choices (45 gm/meal); Low Sodium (2 gm); Mildly Thick (Nectar)  ADULT ORAL NUTRITION SUPPLEMENT; Dinner, Lunch; Frozen Oral Supplement  Code Status: Limited   - DNI.        Karena David MD 7/10/2022 12:38 PM

## 2022-07-10 NOTE — PROGRESS NOTES
RT Inhaler-Nebulizer Bronchodilator Protocol Note    There is a bronchodilator order in the chart from a provider indicating to follow the RT Bronchodilator Protocol and there is an Initiate RT Inhaler-Nebulizer Bronchodilator Protocol order as well (see protocol at bottom of note). CXR Findings:  No results found. The findings from the last RT Protocol Assessment were as follows:   History Pulmonary Disease: Chronic pulmonary disease  Respiratory Pattern: Dyspnea on exertion or RR 21-25 bpm  Breath Sounds: Inspiratory and expiratory or bilateral wheezing and/or rhonchi  Cough: Weak, non-productive  Indication for Bronchodilator Therapy: On home bronchodilators  Bronchodilator Assessment Score: 13    Aerosolized bronchodilator medication orders have been revised according to the RT Inhaler-Nebulizer Bronchodilator Protocol below. Respiratory Therapist to perform RT Therapy Protocol Assessment initially then follow the protocol. Repeat RT Therapy Protocol Assessment PRN for score 0-3 or on second treatment, BID, and PRN for scores above 3. No Indications - adjust the frequency to every 6 hours PRN wheezing or bronchospasm, if no treatments needed after 48 hours then discontinue using Per Protocol order mode. If indication present, adjust the RT bronchodilator orders based on the Bronchodilator Assessment Score as indicated below. Use Inhaler orders unless patient has one or more of the following: on home nebulizer, not able to hold breath for 10 seconds, is not alert and oriented, cannot activate and use MDI correctly, or respiratory rate 25 breaths per minute or more, then use the equivalent nebulizer order(s) with same Frequency and PRN reasons based on the score. If a patient is on this medication at home then do not decrease Frequency below that used at home.     0-3 - enter or revise RT bronchodilator order(s) to equivalent RT Bronchodilator order with Frequency of every 4 hours PRN for wheezing or increased work of breathing using Per Protocol order mode. 4-6 - enter or revise RT Bronchodilator order(s) to two equivalent RT bronchodilator orders with one order with BID Frequency and one order with Frequency of every 4 hours PRN wheezing or increased work of breathing using Per Protocol order mode. 7-10 - enter or revise RT Bronchodilator order(s) to two equivalent RT bronchodilator orders with one order with TID Frequency and one order with Frequency of every 4 hours PRN wheezing or increased work of breathing using Per Protocol order mode. 11-13 - enter or revise RT Bronchodilator order(s) to one equivalent RT bronchodilator order with QID Frequency and an Albuterol order with Frequency of every 4 hours PRN wheezing or increased work of breathing using Per Protocol order mode. Greater than 13 - enter or revise RT Bronchodilator order(s) to one equivalent RT bronchodilator order with every 4 hours Frequency and an Albuterol order with Frequency of every 2 hours PRN wheezing or increased work of breathing using Per Protocol order mode. Patient will benefit from treatments.      Electronically signed by Batsheva Russell RCP on 7/9/2022 at 8:29 PM

## 2022-07-10 NOTE — PLAN OF CARE
Problem: Discharge Planning  Goal: Discharge to home or other facility with appropriate resources  Outcome: Progressing     Problem: Pain  Goal: Verbalizes/displays adequate comfort level or baseline comfort level  Outcome: Progressing     Problem: ABCDS Injury Assessment  Goal: Absence of physical injury  Outcome: Progressing  Flowsheets (Taken 7/10/2022 1133)  Absence of Physical Injury: Implement safety measures based on patient assessment     Problem: Skin/Tissue Integrity  Goal: Absence of new skin breakdown  Description: 1. Monitor for areas of redness and/or skin breakdown  2. Assess vascular access sites hourly  3. Every 4-6 hours minimum:  Change oxygen saturation probe site  4. Every 4-6 hours:  If on nasal continuous positive airway pressure, respiratory therapy assess nares and determine need for appliance change or resting period.   Outcome: Progressing     Problem: Chronic Conditions and Co-morbidities  Goal: Patient's chronic conditions and co-morbidity symptoms are monitored and maintained or improved  Outcome: Progressing     Problem: Safety - Adult  Goal: Free from fall injury  Outcome: Progressing  Flowsheets (Taken 7/10/2022 1133)  Free From Fall Injury: Instruct family/caregiver on patient safety     Problem: Nutrition Deficit:  Goal: Optimize nutritional status  Outcome: Progressing     Problem: Respiratory - Adult  Goal: Achieves optimal ventilation and oxygenation  Outcome: Progressing     Problem: Cardiovascular - Adult  Goal: Maintains optimal cardiac output and hemodynamic stability  Outcome: Progressing  Goal: Absence of cardiac dysrhythmias or at baseline  Outcome: Progressing     Problem: Skin/Tissue Integrity - Adult  Goal: Skin integrity remains intact  Outcome: Progressing  Flowsheets (Taken 7/10/2022 1134)  Skin Integrity Remains Intact: Monitor for areas of redness and/or skin breakdown  Goal: Oral mucous membranes remain intact  Outcome: Progressing  Flowsheets (Taken 7/10/2022 1134)  Oral Mucous Membranes Remain Intact:   Assess oral mucosa and hygiene practices   Implement preventative oral hygiene regimen   Implement oral medicated treatments as ordered     Problem: Musculoskeletal - Adult  Goal: Return mobility to safest level of function  Outcome: Progressing     Problem: Gastrointestinal - Adult  Goal: Minimal or absence of nausea and vomiting  Outcome: Progressing     Problem: Genitourinary - Adult  Goal: Urinary catheter remains patent  Outcome: Progressing     Problem: Infection - Adult  Goal: Absence of infection at discharge  Outcome: Progressing     Problem: Metabolic/Fluid and Electrolytes - Adult  Goal: Electrolytes maintained within normal limits  Outcome: Progressing

## 2022-07-11 ENCOUNTER — APPOINTMENT (OUTPATIENT)
Dept: GENERAL RADIOLOGY | Age: 82
DRG: 004 | End: 2022-07-11
Payer: MEDICARE

## 2022-07-11 LAB
ANION GAP SERPL CALCULATED.3IONS-SCNC: 13 MMOL/L (ref 3–16)
BASE EXCESS ARTERIAL: 11.8 MMOL/L (ref -3–3)
BASOPHILS ABSOLUTE: 0.1 K/UL (ref 0–0.2)
BASOPHILS RELATIVE PERCENT: 0.6 %
BUN BLDV-MCNC: 25 MG/DL (ref 7–20)
CALCIUM SERPL-MCNC: 10.1 MG/DL (ref 8.3–10.6)
CARBOXYHEMOGLOBIN ARTERIAL: 0.3 % (ref 0–1.5)
CHLORIDE BLD-SCNC: 97 MMOL/L (ref 99–110)
CO2: 33 MMOL/L (ref 21–32)
CREAT SERPL-MCNC: 1.7 MG/DL (ref 0.6–1.2)
EOSINOPHILS ABSOLUTE: 0.7 K/UL (ref 0–0.6)
EOSINOPHILS RELATIVE PERCENT: 6.6 %
GFR AFRICAN AMERICAN: 35
GFR NON-AFRICAN AMERICAN: 29
GLUCOSE BLD-MCNC: 105 MG/DL (ref 70–99)
GLUCOSE BLD-MCNC: 94 MG/DL (ref 70–99)
HCO3 ARTERIAL: 37.2 MMOL/L (ref 21–29)
HCT VFR BLD CALC: 30.5 % (ref 36–48)
HEMOGLOBIN, ART, EXTENDED: 10.2 G/DL (ref 12–16)
HEMOGLOBIN: 10 G/DL (ref 12–16)
LYMPHOCYTES ABSOLUTE: 1.5 K/UL (ref 1–5.1)
LYMPHOCYTES RELATIVE PERCENT: 15 %
MAGNESIUM: 1.8 MG/DL (ref 1.8–2.4)
MCH RBC QN AUTO: 28 PG (ref 26–34)
MCHC RBC AUTO-ENTMCNC: 32.6 G/DL (ref 31–36)
MCV RBC AUTO: 86 FL (ref 80–100)
METHEMOGLOBIN ARTERIAL: 0.3 %
MONOCYTES ABSOLUTE: 0.8 K/UL (ref 0–1.3)
MONOCYTES RELATIVE PERCENT: 8.5 %
NEUTROPHILS ABSOLUTE: 6.9 K/UL (ref 1.7–7.7)
NEUTROPHILS RELATIVE PERCENT: 69.3 %
O2 SAT, ARTERIAL: 89.7 %
O2 THERAPY: ABNORMAL
PCO2 ARTERIAL: 53.8 MMHG (ref 35–45)
PDW BLD-RTO: 14 % (ref 12.4–15.4)
PERFORMED ON: ABNORMAL
PH ARTERIAL: 7.46 (ref 7.35–7.45)
PHOSPHORUS: 4.3 MG/DL (ref 2.5–4.9)
PLATELET # BLD: 321 K/UL (ref 135–450)
PMV BLD AUTO: 8.6 FL (ref 5–10.5)
PO2 ARTERIAL: 55.3 MMHG (ref 75–108)
POTASSIUM SERPL-SCNC: 4.3 MMOL/L (ref 3.5–5.1)
RBC # BLD: 3.55 M/UL (ref 4–5.2)
SODIUM BLD-SCNC: 143 MMOL/L (ref 136–145)
TCO2 ARTERIAL: 38.9 MMOL/L
VANCOMYCIN RANDOM: 19 UG/ML
WBC # BLD: 10 K/UL (ref 4–11)

## 2022-07-11 PROCEDURE — C9113 INJ PANTOPRAZOLE SODIUM, VIA: HCPCS | Performed by: INTERNAL MEDICINE

## 2022-07-11 PROCEDURE — 94761 N-INVAS EAR/PLS OXIMETRY MLT: CPT

## 2022-07-11 PROCEDURE — 6360000002 HC RX W HCPCS: Performed by: INTERNAL MEDICINE

## 2022-07-11 PROCEDURE — 99233 SBSQ HOSP IP/OBS HIGH 50: CPT | Performed by: INTERNAL MEDICINE

## 2022-07-11 PROCEDURE — 36415 COLL VENOUS BLD VENIPUNCTURE: CPT

## 2022-07-11 PROCEDURE — 6370000000 HC RX 637 (ALT 250 FOR IP): Performed by: INTERNAL MEDICINE

## 2022-07-11 PROCEDURE — 84100 ASSAY OF PHOSPHORUS: CPT

## 2022-07-11 PROCEDURE — 94640 AIRWAY INHALATION TREATMENT: CPT

## 2022-07-11 PROCEDURE — 94660 CPAP INITIATION&MGMT: CPT

## 2022-07-11 PROCEDURE — 80048 BASIC METABOLIC PNL TOTAL CA: CPT

## 2022-07-11 PROCEDURE — 2700000000 HC OXYGEN THERAPY PER DAY

## 2022-07-11 PROCEDURE — 94669 MECHANICAL CHEST WALL OSCILL: CPT

## 2022-07-11 PROCEDURE — 71045 X-RAY EXAM CHEST 1 VIEW: CPT

## 2022-07-11 PROCEDURE — 2000000000 HC ICU R&B

## 2022-07-11 PROCEDURE — 99291 CRITICAL CARE FIRST HOUR: CPT | Performed by: INTERNAL MEDICINE

## 2022-07-11 PROCEDURE — 80202 ASSAY OF VANCOMYCIN: CPT

## 2022-07-11 PROCEDURE — 31720 CLEARANCE OF AIRWAYS: CPT

## 2022-07-11 PROCEDURE — 2580000003 HC RX 258: Performed by: INTERNAL MEDICINE

## 2022-07-11 PROCEDURE — 82803 BLOOD GASES ANY COMBINATION: CPT

## 2022-07-11 PROCEDURE — 85025 COMPLETE CBC W/AUTO DIFF WBC: CPT

## 2022-07-11 PROCEDURE — 99232 SBSQ HOSP IP/OBS MODERATE 35: CPT | Performed by: INTERNAL MEDICINE

## 2022-07-11 PROCEDURE — 83735 ASSAY OF MAGNESIUM: CPT

## 2022-07-11 RX ORDER — METOLAZONE 2.5 MG/1
5 TABLET ORAL ONCE
Status: COMPLETED | OUTPATIENT
Start: 2022-07-11 | End: 2022-07-11

## 2022-07-11 RX ORDER — DIMETHICONE, OXYBENZONE, AND PADIMATE O 2; 2.5; 6.6 G/100G; G/100G; G/100G
STICK TOPICAL
Status: DISPENSED
Start: 2022-07-11 | End: 2022-07-12

## 2022-07-11 RX ADMIN — IPRATROPIUM BROMIDE AND ALBUTEROL SULFATE 1 AMPULE: 2.5; .5 SOLUTION RESPIRATORY (INHALATION) at 15:06

## 2022-07-11 RX ADMIN — PANTOPRAZOLE SODIUM 40 MG: 40 INJECTION, POWDER, LYOPHILIZED, FOR SOLUTION INTRAVENOUS at 09:46

## 2022-07-11 RX ADMIN — IPRATROPIUM BROMIDE AND ALBUTEROL SULFATE 1 AMPULE: 2.5; .5 SOLUTION RESPIRATORY (INHALATION) at 11:45

## 2022-07-11 RX ADMIN — PANTOPRAZOLE SODIUM 40 MG: 40 INJECTION, POWDER, LYOPHILIZED, FOR SOLUTION INTRAVENOUS at 20:32

## 2022-07-11 RX ADMIN — VANCOMYCIN HYDROCHLORIDE 500 MG: 500 INJECTION, POWDER, LYOPHILIZED, FOR SOLUTION INTRAVENOUS at 09:25

## 2022-07-11 RX ADMIN — CARBIDOPA AND LEVODOPA 1 TABLET: 25; 100 TABLET ORAL at 09:44

## 2022-07-11 RX ADMIN — DULOXETINE HYDROCHLORIDE 60 MG: 60 CAPSULE, DELAYED RELEASE ORAL at 09:44

## 2022-07-11 RX ADMIN — IPRATROPIUM BROMIDE AND ALBUTEROL SULFATE 1 AMPULE: 2.5; .5 SOLUTION RESPIRATORY (INHALATION) at 19:57

## 2022-07-11 RX ADMIN — PROPAFENONE HYDROCHLORIDE 150 MG: 150 TABLET, FILM COATED ORAL at 13:45

## 2022-07-11 RX ADMIN — METOLAZONE 5 MG: 2.5 TABLET ORAL at 13:45

## 2022-07-11 RX ADMIN — CARBIDOPA AND LEVODOPA 1 TABLET: 25; 100 TABLET ORAL at 13:45

## 2022-07-11 RX ADMIN — SODIUM BICARBONATE 650 MG: 650 TABLET ORAL at 09:44

## 2022-07-11 RX ADMIN — FUROSEMIDE 40 MG: 10 INJECTION, SOLUTION INTRAMUSCULAR; INTRAVENOUS at 09:48

## 2022-07-11 RX ADMIN — MEROPENEM 1000 MG: 1 INJECTION, POWDER, FOR SOLUTION INTRAVENOUS at 05:50

## 2022-07-11 RX ADMIN — ACETYLCYSTEINE 600 MG: 200 INHALANT RESPIRATORY (INHALATION) at 07:35

## 2022-07-11 RX ADMIN — APIXABAN 2.5 MG: 5 TABLET, FILM COATED ORAL at 09:44

## 2022-07-11 RX ADMIN — ACETYLCYSTEINE 600 MG: 200 INHALANT RESPIRATORY (INHALATION) at 19:57

## 2022-07-11 RX ADMIN — Medication 15 ML: at 09:45

## 2022-07-11 RX ADMIN — IPRATROPIUM BROMIDE AND ALBUTEROL SULFATE 1 AMPULE: 2.5; .5 SOLUTION RESPIRATORY (INHALATION) at 23:07

## 2022-07-11 RX ADMIN — SODIUM CHLORIDE, PRESERVATIVE FREE 10 ML: 5 INJECTION INTRAVENOUS at 20:32

## 2022-07-11 RX ADMIN — MONTELUKAST SODIUM 10 MG: 10 TABLET, COATED ORAL at 09:44

## 2022-07-11 RX ADMIN — IPRATROPIUM BROMIDE AND ALBUTEROL SULFATE 1 AMPULE: 2.5; .5 SOLUTION RESPIRATORY (INHALATION) at 07:35

## 2022-07-11 RX ADMIN — LAMOTRIGINE 50 MG: 25 TABLET ORAL at 09:44

## 2022-07-11 ASSESSMENT — PAIN SCALES - GENERAL
PAINLEVEL_OUTOF10: 0
PAINLEVEL_OUTOF10: 0

## 2022-07-11 NOTE — PROGRESS NOTES
RT Inhaler-Nebulizer Bronchodilator Protocol Note    There is a bronchodilator order in the chart from a provider indicating to follow the RT Bronchodilator Protocol and there is an Initiate RT Inhaler-Nebulizer Bronchodilator Protocol order as well (see protocol at bottom of note). CXR Findings:  No results found. The findings from the last RT Protocol Assessment were as follows:   History Pulmonary Disease: (P) Chronic pulmonary disease  Respiratory Pattern: (P) Dyspnea on exertion or RR 21-25 bpm  Breath Sounds: (P) Slightly diminished and/or crackles  Cough: (P) Weak, productive  Indication for Bronchodilator Therapy: (P) On home bronchodilators  Bronchodilator Assessment Score: (P) 8    Aerosolized bronchodilator medication orders have been revised according to the RT Inhaler-Nebulizer Bronchodilator Protocol below. Respiratory Therapist to perform RT Therapy Protocol Assessment initially then follow the protocol. Repeat RT Therapy Protocol Assessment PRN for score 0-3 or on second treatment, BID, and PRN for scores above 3. No Indications - adjust the frequency to every 6 hours PRN wheezing or bronchospasm, if no treatments needed after 48 hours then discontinue using Per Protocol order mode. If indication present, adjust the RT bronchodilator orders based on the Bronchodilator Assessment Score as indicated below. Use Inhaler orders unless patient has one or more of the following: on home nebulizer, not able to hold breath for 10 seconds, is not alert and oriented, cannot activate and use MDI correctly, or respiratory rate 25 breaths per minute or more, then use the equivalent nebulizer order(s) with same Frequency and PRN reasons based on the score. If a patient is on this medication at home then do not decrease Frequency below that used at home.     0-3 - enter or revise RT bronchodilator order(s) to equivalent RT Bronchodilator order with Frequency of every 4 hours PRN for wheezing or increased work of breathing using Per Protocol order mode. 4-6 - enter or revise RT Bronchodilator order(s) to two equivalent RT bronchodilator orders with one order with BID Frequency and one order with Frequency of every 4 hours PRN wheezing or increased work of breathing using Per Protocol order mode. 7-10 - enter or revise RT Bronchodilator order(s) to two equivalent RT bronchodilator orders with one order with TID Frequency and one order with Frequency of every 4 hours PRN wheezing or increased work of breathing using Per Protocol order mode. 11-13 - enter or revise RT Bronchodilator order(s) to one equivalent RT bronchodilator order with QID Frequency and an Albuterol order with Frequency of every 4 hours PRN wheezing or increased work of breathing using Per Protocol order mode. Greater than 13 - enter or revise RT Bronchodilator order(s) to one equivalent RT bronchodilator order with every 4 hours Frequency and an Albuterol order with Frequency of every 2 hours PRN wheezing or increased work of breathing using Per Protocol order mode.          Electronically signed by Annmarie Tan RCP on 7/11/2022 at 7:39 AM

## 2022-07-11 NOTE — PROGRESS NOTES
Page  has been called to Dr. Mahnaz Solano on 7/11/22.  Spoke with Carly Abel. 4:09 PM    Gustabo Sung  7/11/2022

## 2022-07-11 NOTE — PROGRESS NOTES
Pt taken off of BIPAP and placed on Airvo. 60L/85%. Pt is 93% and tolerating well. Pt denied wanting NT suction at this time. Continuing to monitor pt.

## 2022-07-11 NOTE — PLAN OF CARE
Problem: Discharge Planning  Goal: Discharge to home or other facility with appropriate resources  7/10/2022 2332 by Juancho Barker RN  Outcome: Progressing  7/10/2022 1516 by Kenia Nash RN  Outcome: Progressing     Problem: Pain  Goal: Verbalizes/displays adequate comfort level or baseline comfort level  7/10/2022 2332 by Juancho Barker RN  Outcome: Progressing  7/10/2022 1516 by Kenia Nash RN  Outcome: Progressing     Problem: ABCDS Injury Assessment  Goal: Absence of physical injury  7/10/2022 2332 by Juancho Barker RN  Outcome: Progressing  7/10/2022 1516 by Kenia Nash RN  Outcome: Progressing  Flowsheets (Taken 7/10/2022 1133)  Absence of Physical Injury: Implement safety measures based on patient assessment     Problem: Skin/Tissue Integrity  Goal: Absence of new skin breakdown  Description: 1. Monitor for areas of redness and/or skin breakdown  2. Assess vascular access sites hourly  3. Every 4-6 hours minimum:  Change oxygen saturation probe site  4. Every 4-6 hours:  If on nasal continuous positive airway pressure, respiratory therapy assess nares and determine need for appliance change or resting period.   7/10/2022 2332 by Juancho Barker RN  Outcome: Progressing  7/10/2022 1516 by Kenia Nash RN  Outcome: Progressing     Problem: Chronic Conditions and Co-morbidities  Goal: Patient's chronic conditions and co-morbidity symptoms are monitored and maintained or improved  7/10/2022 2332 by Juancho Barker RN  Outcome: Progressing  7/10/2022 1516 by Kenia Nash RN  Outcome: Progressing     Problem: Safety - Adult  Goal: Free from fall injury  7/10/2022 2332 by Juancho Barker RN  Outcome: Progressing  7/10/2022 1516 by Kenia Nash RN  Outcome: Progressing  Flowsheets (Taken 7/10/2022 1133)  Free From Fall Injury: Instruct family/caregiver on patient safety     Problem: Nutrition Deficit:  Goal: Optimize nutritional status  7/10/2022 2332 by Juancho Barker RN  Outcome: Progressing  7/10/2022 1516 by Osito Bonilla RN  Outcome: Progressing     Problem: Respiratory - Adult  Goal: Achieves optimal ventilation and oxygenation  7/10/2022 2332 by Jim French RN  Outcome: Progressing  7/10/2022 1516 by Osito Bonilla RN  Outcome: Progressing     Problem: Cardiovascular - Adult  Goal: Maintains optimal cardiac output and hemodynamic stability  7/10/2022 2332 by Jim French RN  Outcome: Progressing  7/10/2022 1516 by Osiot Bonilla RN  Outcome: Progressing  Goal: Absence of cardiac dysrhythmias or at baseline  7/10/2022 2332 by Jim French RN  Outcome: Progressing  7/10/2022 1516 by Osito Bonilla RN  Outcome: Progressing     Problem: Skin/Tissue Integrity - Adult  Goal: Skin integrity remains intact  7/10/2022 2332 by Jim French RN  Outcome: Progressing  7/10/2022 1516 by Osito Bonilla RN  Outcome: Progressing  Flowsheets (Taken 7/10/2022 1134)  Skin Integrity Remains Intact: Monitor for areas of redness and/or skin breakdown  Goal: Oral mucous membranes remain intact  7/10/2022 2332 by Jim French RN  Outcome: Progressing  7/10/2022 1516 by Osito Bonilla RN  Outcome: Progressing  Flowsheets (Taken 7/10/2022 1134)  Oral Mucous Membranes Remain Intact:   Assess oral mucosa and hygiene practices   Implement preventative oral hygiene regimen   Implement oral medicated treatments as ordered     Problem: Musculoskeletal - Adult  Goal: Return mobility to safest level of function  7/10/2022 2332 by Jim French RN  Outcome: Progressing  7/10/2022 1516 by Osito Bonilla RN  Outcome: Progressing     Problem: Gastrointestinal - Adult  Goal: Minimal or absence of nausea and vomiting  7/10/2022 2332 by Jim French RN  Outcome: Progressing  7/10/2022 1516 by Osito Bonilla RN  Outcome: Progressing     Problem: Genitourinary - Adult  Goal: Urinary catheter remains patent  7/10/2022 2332 by Jim French RN  Outcome: Progressing  7/10/2022 1516 by Darell Leija RN  Outcome: Progressing     Problem: Infection - Adult  Goal: Absence of infection at discharge  7/10/2022 2332 by Aquilino Rivera RN  Outcome: Progressing  7/10/2022 1516 by Darell Leija RN  Outcome: Progressing     Problem: Metabolic/Fluid and Electrolytes - Adult  Goal: Electrolytes maintained within normal limits  7/10/2022 2332 by Aquilino Rivera RN  Outcome: Progressing  7/10/2022 1516 by Darell Leija RN  Outcome: Progressing

## 2022-07-11 NOTE — PROGRESS NOTES
[facility] Progress Note           Subjective/   80y.o. year old female who we are seeing in consultation for FARHEEN/CKD. HPI:  Patient was seen and examined. Doing worse overnight. More short of breath and increased oxygen requirement. Scr is slightly up with lasix increased to BID dosing. ROS: No fever or chills. + edema.   Good urine volume   Social:   at bedside     Objective/   GEN:  Chronically ill, BP (!) 109/56   Pulse 84   Temp 98.8 °F (37.1 °C) (Oral)   Resp 19   Ht 5' 6\" (1.676 m)   Wt 250 lb 3.2 oz (113.5 kg)   SpO2 91%   BMI 40.38 kg/m²    From 7/4  HEENT: non-icteric, atraumatic  CV: S1, S2 without r/g; + LE edema  RESP: Decreased breath sounds, no wheezing, mild distress  ABD: +bs, soft, nt, no hsm  SKIN: warm, no rashes    Data/  Recent Labs     07/09/22  0439 07/10/22  0435 07/11/22  0552   WBC 12.3* 10.4 10.0   HGB 9.6* 10.0* 10.0*   HCT 30.0* 31.5* 30.5*   MCV 85.8 85.9 86.0    338 321     Recent Labs     07/09/22  0439 07/10/22  0435 07/11/22  0552    143 143   K 3.9 4.1 4.3    100 97*   CO2 28 31 33*   GLUCOSE 116* 104* 94   PHOS 3.9 3.8 4.3   MG 1.70* 2.10 1.80   BUN 29* 25* 25*   CREATININE 1.6* 1.5* 1.7*   LABGLOM 31* 33* 29*   GFRAA 38* 40* 35*     Component      Latest Ref Rng & Units 6/22/2022 6/22/2022           4:10 PM  4:10 PM   Color, UA      Straw/Yellow Yellow    Clarity, UA      Clear Clear    Glucose, UA      Negative mg/dL Negative    Bilirubin, Urine      Negative Negative    Ketones, Urine      Negative mg/dL TRACE (A)    Specific Gravity, UA      1.005 - 1.030 1.025    Blood, Urine      Negative Negative    pH, UA      5.0 - 8.0 5.5    Protein, UA      Negative mg/dL TRACE (A)    Urobilinogen, Urine      <2.0 E.U./dL 0.2    Nitrite, Urine      Negative Negative    Leukocyte Esterase, Urine      Negative Negative    Microscopic Examination       YES    Urine Type       NotGiven    Mucus, UA      None Seen /LPF Rare (A)    WBC, UA      0 - 5 /HPF 10-20 (A)    RBC, UA      0 - 4 /HPF 3-4    Epithelial Cells, UA      0 - 5 /HPF 0-1    Bacteria, UA      None Seen /HPF 2+ (A)    Sodium, Ur      Not Established mmol/L  <20   Potassium, Ur      Not Established mmol/L  26.3   Chloride      Not Established mmol/L  <20       Assessment/     - Acute kidney injury - multi-factorial ATN injury in setting of aspiration PNA, hypotension, & elevated vancomycin levels. Scr is up to 1.7 with diuresis    She is still fluid overloaded with worsening oxygen requirements     - Chronic kidney disease stage 3b - background DM/HTN/CHF              SCr 1.6              Follows [facility]     - Acute hypoxemic respiratory failure/Aspiration PNA/Pulmonary edema    Worsening      - Diastolic CHF     - Atrial fibrillation    - Anemia - prn prbc's     - Hypertension   Range is ok     - Hypokalemia, improved. -Metabolic acidosis, resolved    -Dysphagia, patient has been started on some puréed food    Plan/     Continue lasix 40 mg IV BID - will add metolazone to augment diuresis and keep as dry as possible   Follow labs    ____________________________________  Jenny Echeverria MD  [facility]  [email or web address]  Office: [phone]

## 2022-07-11 NOTE — PROGRESS NOTES
Pt and family verbally stating that they want to change code status to be a full code and do intubation if needed. Stated in front of MD. Updated in chart at this time.     Naren Uribe RN

## 2022-07-11 NOTE — PROGRESS NOTES
Pulmonary & Critical Care Medicine ICU Progress Note    CC: shortness of breath     Events of Last 24 hours:   BiPAP overnight  More upper airway secretions  Worsening hypoxia  White out left lung   Thick secretions   Weak cough     Vascular lines: IV: PICC 6/19/2022 P      Intake/Output Summary (Last 24 hours) at 7/11/2022 0849  Last data filed at 7/11/2022 0600  Gross per 24 hour   Intake --   Output 700 ml   Net -700 ml         MV: 6/18/2022 -6/27/22  Vent Mode: AC/VC Resp Rate (Set): 0 bmp/Vt (Set, mL): 0 mL/ /FiO2 : 85 %  No results for input(s): PHART, OUK0GNR, PO2ART in the last 72 hours. IV:   sodium chloride      sodium chloride 5 mL/hr at 07/10/22 0747       Vitals:  Blood pressure 127/68, pulse 78, temperature 98.2 °F (36.8 °C), temperature source Axillary, resp. rate 17, height 5' 6\" (1.676 m), weight 250 lb 3.2 oz (113.5 kg), SpO2 93 %, not currently breastfeeding. On  5 L  Constitutional:  No acute distress. HENT:  Oropharynx is clear and moist.   Neck: No tracheal deviation present. Cardiovascular: Normal heart sounds. No lower extremity edema. Pulmonary/Chest: No wheezes. + rhonchi. No rales. No decreased breath sounds. No accessory muscle usage or stridor. Musculoskeletal: No cyanosis. No clubbing. Skin: Skin is warm and dry. Psychiatric: Normal mood and affect.   Neurologic: speech fluent, alert and oriented, strength symmetric          Scheduled Meds:   vancomycin  500 mg IntraVENous Once    furosemide  40 mg IntraVENous BID    sodium bicarbonate  650 mg Oral Daily    potassium bicarb-citric acid  20 mEq Oral Daily    fluconazole  200 mg IntraVENous Q24H    acetylcysteine  600 mg Inhalation BID    metoprolol tartrate  50 mg Oral BID    ipratropium-albuterol  1 ampule Inhalation Q4H While awake    meropenem  1,000 mg IntraVENous Q12H    vancomycin (VANCOCIN) intermittent dosing (placeholder)   Other RX Placeholder    pantoprazole  40 mg IntraVENous BID    chlorhexidine  15 mL Mouth/Throat BID    sodium chloride flush  10 mL IntraVENous 2 times per day    apixaban  2.5 mg Oral BID    atorvastatin  40 mg Oral Nightly    carbidopa-levodopa  1 tablet Oral 4x Daily    DULoxetine  60 mg Oral Daily    lamoTRIgine  50 mg Oral BID    montelukast  10 mg Oral Daily    propafenone  150 mg Oral 3 times per day       Data:  CBC:   Recent Labs     07/09/22  0439 07/10/22  0435 07/11/22  0552   WBC 12.3* 10.4 10.0   HGB 9.6* 10.0* 10.0*   HCT 30.0* 31.5* 30.5*   MCV 85.8 85.9 86.0    338 321     BMP:   Recent Labs     07/09/22  0439 07/10/22  0435 07/11/22  0552    143 143   K 3.9 4.1 4.3    100 97*   CO2 28 31 33*   PHOS 3.9 3.8 4.3   BUN 29* 25* 25*   CREATININE 1.6* 1.5* 1.7*     LIVER PROFILE:   No results for input(s): AST, ALT, LIPASE, BILIDIR, BILITOT, ALKPHOS in the last 72 hours. Invalid input(s): AMYLASE,  ALB    Microbiology:  6/17/2022 SARS-CoV-2 and influenza are negative  6/17/2022 MRSA DNA screen positive  6/17/2022 blood NG  6/17/2022 sputum NRF  6/18/2022 tracheal aspirate NRF  6/19/2022 BAL Candida  6/19/2022 respiratory viral panel negative  7/1/22 Blood cx NGTD  7/7/2022 bronchial washings and BAL Candida krusei      Imaging:  CT chest 7/3/22  images reviewed by me and showed:   1. Multifocal bronchopneumonia, with progression since 06/17/2022.   2. Small bilateral pleural effusions  3. Narrowing of the AP diameter of the trachea  4.  Mild cardiomegaly    ASSESSMENT:  · Acute hypoxemic respiratory failure, initially on vent, extubated but then returned to ICU and required BiPAP  · Aspiration pneumonia - recurrent  · Mucous plugging of bronchi on bronchoscopy 7/7/2022  · Tracheobronchomalacia on bronchoscopy 7/7/2022 -  perhaps limiting airway clearance  · BETSY on BiPAP 22/15 with 1 L bleed in   · Parkinson's disease  · Atrial fibrillation  · Acute on chronic kidney failure, baseline creatinine 1.6  · Acute on chronic anemia, s/p 2 U PRBC 6/23/2022  · H/O esophagitis  · Pleural effusions are small bilaterally    PLAN:  CXR shows white out lung  Giving she does not want intubation ,will try aggressive pulmonary toilet ,. If CXR not better will do bronch and she may need intubation family understand and they aggred   Will observe ICU  Bilevel non-invasive positive pressure ventilation HS  Mucomyst 3 ML 20% inhalation BID  Metaneb  NT suctioning as needed   Chest vest   Vanc and Merrem D#11. Fluconazole D#11will be continued until off other IV antibiotics  ID following  Nephrology following, managing diuretics  On Eliquis  DNI    discussed with    To update IM  Discussed with nurse   Deep suction was done and a lot of thick secretions  Sat improved  biPAP and possible intubation     Care reviewed with nursing staff, medical and surgical specialty care, primary care and the patient's family as available. Chart review/lab review/X-ray viewing/documentation and had long Conversation with patient/family re: prognosis, care options and any end of life issues:      Critical care time spent reviewing labs/films, examining patient, collaborating with other physicians more than 35  Minutes  excluding procedures . Matt Fierro M.D.   7/11/2022  4:50 PM

## 2022-07-11 NOTE — FLOWSHEET NOTE
07/11/22 0930   Vital Signs   Temp 98.8 °F (37.1 °C)   Temp Source Oral   Heart Rate 84   Heart Rate Source Monitor   Resp 19   BP (!) 109/56   BP Location Left lower arm   MAP (Calculated) 73.67   Patient Position Semi fowlers   Level of Consciousness Alert (0)   MEWS Score 1   Pain Assessment   Pain Assessment 0-10   Pain Level 0   Oxygen Therapy   SpO2 91 %   O2 Device Heated high flow cannula   Skin Assessment Clean, dry, & intact   FiO2  85 %   O2 Flow Rate (L/min) 60 L/min     Pt assessment complete; see flow sheets. Vitals completed. Meds given per MAR. Pt repositioned for comfort.  at bedside; updated on care plan. Pt stable.     Benita Almazan RN

## 2022-07-11 NOTE — PROGRESS NOTES
Consult has been called to Palliative care RN on 7/11/22. Spoke with Jamee Singer.  11:11 AM    Angelito Dowell  7/11/2022

## 2022-07-11 NOTE — PROGRESS NOTES
CXR ordered. Completed at this time. Complete white out of L lung. MD notified.  Awaiting orders from MD Andree Michaels, RN

## 2022-07-11 NOTE — CONSULTS
Palliative Care Initial Note  Palliative Care Admit date:07/11/2022  ACP/Palliative Care this date.     Fadumo Almaguer RN Palliative Care

## 2022-07-11 NOTE — PROGRESS NOTES
[facility] Infectious Disease Progress Note      Ozzie Paul     : 1940    DATE OF VISIT:  2022  DATE OF ADMISSION:  2022       Subjective:     Ozzie Paul is a 80 y.o. female whom I've been seeing for a nosocomial aspiration pneumonia. Since I last saw her, it sounds like she was doing better Friday-Saturday, but then became a bit more weak yesterday into today, with more cough, difficulty in clearing secretions, now on high-flow nasal oxygen with some humidification. Mouth feels a bit better. Not as dyspneic when not coughing. No F/C/D. No CP, no N/V/D. Ms. Keith Fitch has a past medical history of Anemia, unspecified, Asthma, Atrial fibrillation ([address] Baker's cyst, Basal cell carcinoma of left cheek, Blind left eye, Carotid artery stenoses, Carotid stenosis, Cervical spinal cord compression (HCC), 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-sided sciatica, CKD (chronic kidney disease) stage 3, GFR 30-59 ml/min ([address] Community acquired pneumonia of left lower lobe of lung, CRI, Detached retina, left, Diverticulosis, DJD (degenerative joint disease), Edema, Fatty liver, GERD (gastroesophageal reflux disease), Hearing loss, Herniated lumbar intervertebral disc, Hx stage 2 sacral decubitus ulcer, Hyperlipidemia, Hypertension, Hypothyroid, Morbid obesity with BMI of 40.0-44.9, adult ([address] BETSY treated with BiPAP, Parkinson's disease ([address] Partial symptomatic epilepsy with complex partial seizures, not intractable, without status epilepticus ([address] Restless leg syndrome, Restless legs syndrome, Rheumatic fever, Sleep apnea, Tremor, essential, Type 2 diabetes mellitus without complication ([address] and Type II or unspecified type diabetes mellitus without mention of complication, not stated as uncontrolled.     Current Facility-Administered Medications: metOLazone (ZAROXOLYN) tablet 5 mg, 5 mg, Oral, Once  furosemide (LASIX) injection 40 mg, 40 mg, IntraVENous, BID  sodium bicarbonate tablet 650 mg, 650 mg, Oral, Daily  potassium bicarb-citric acid (EFFER-K) effervescent tablet 20 mEq, 20 mEq, Oral, Daily  potassium chloride 20 mEq/50 mL IVPB (Central Line), 20 mEq, IntraVENous, PRN  magnesium sulfate 1000 mg in dextrose 5% 100 mL IVPB, 1,000 mg, IntraVENous, PRN  sodium phosphate 20.73 mmol in sodium chloride 0.9 % 250 mL IVPB, 0.16 mmol/kg, IntraVENous, PRN **OR** sodium phosphate 41.43 mmol in sodium chloride 0.9 % 500 mL IVPB, 0.32 mmol/kg, IntraVENous, PRN  fluconazole (DIFLUCAN) in 0.9 % sodium chloride IVPB 200 mg, 200 mg, IntraVENous, Q24H  acetylcysteine (MUCOMYST) 20 % solution 600 mg, 600 mg, Inhalation, BID  metoprolol tartrate (LOPRESSOR) tablet 50 mg, 50 mg, Oral, BID  ipratropium-albuterol (DUONEB) nebulizer solution 1 ampule, 1 ampule, Inhalation, Q4H While awake  morphine (PF) injection 1 mg, 1 mg, IntraVENous, Q4H PRN  meropenem (MERREM) 1,000 mg in sodium chloride 0.9 % 100 mL IVPB (mini-bag), 1,000 mg, IntraVENous, Q12H  vancomycin (VANCOCIN) intermittent dosing (placeholder), , Other, RX Placeholder  pantoprazole (PROTONIX) injection 40 mg, 40 mg, IntraVENous, BID  carboxymethylcellulose PF (THERATEARS) 1 % ophthalmic gel 1 drop, 1 drop, Both Eyes, 4x Daily PRN  0.9 % sodium chloride infusion, 25 mL, IntraVENous, PRN  chlorhexidine (PERIDEX) 0.12 % solution 15 mL, 15 mL, Mouth/Throat, BID  sodium chloride flush 0.9 % injection 10 mL, 10 mL, IntraVENous, 2 times per day  sodium chloride flush 0.9 % injection 10 mL, 10 mL, IntraVENous, PRN  0.9 % sodium chloride infusion, , IntraVENous, PRN  promethazine (PHENERGAN) tablet 12.5 mg, 12.5 mg, Oral, Q6H PRN **OR** ondansetron (ZOFRAN) injection 4 mg, 4 mg, IntraVENous, Q6H PRN  acetaminophen (TYLENOL) tablet 650 mg, 650 mg, Oral, Q6H PRN **OR** acetaminophen (TYLENOL) suppository 650 mg, 650 mg, Rectal, Q6H PRN  apixaban (ELIQUIS) tablet 2.5 mg, 2.5 mg, Oral, BID  atorvastatin (LIPITOR) tablet 40 mg, 40 mg, Oral, Nightly  carbidopa-levodopa (SINEMET)  MG per tablet 1 tablet, 1 tablet, Oral, 4x Daily  DULoxetine (CYMBALTA) extended release capsule 60 mg, 60 mg, Oral, Daily  lamoTRIgine (LAMICTAL) tablet 50 mg, 50 mg, Oral, BID  montelukast (SINGULAIR) tablet 10 mg, 10 mg, Oral, Daily  propafenone (RYTHMOL) tablet 150 mg, 150 mg, Oral, 3 times per day  albuterol (PROVENTIL) nebulizer solution 2.5 mg, 2.5 mg, Nebulization, Q4H PRN     This is day 11 of vancomycin - meropenem - Diflucan. Allergies: Levaquin [levofloxacin], Metoclopramide, Phenazopyridine, Pyridium [phenazopyridine hcl], Reglan [metoclopramide hcl], and Reglan [metoclopramide hcl]    Pertinent items from the review of systems are discussed in the HPI; the remainder of the ROS was reviewed and is negative.      Objective:     Vital signs over the last 24 hours:  Temp  Av.5 °F (36.9 °C)  Min: 98.2 °F (36.8 °C)  Max: 98.8 °F (37.1 °C)  Pulse  Av  Min: 78  Max: 505  Systolic (25BDT), RQT:113 , Min:109 , XTP:745   Diastolic (02UYK), FXM:43, Min:56, Max:86  Resp  Av.5  Min: 17  Max: 22  SpO2  Av.1 %  Min: 91 %  Max: 97 %    Constitutional:  well-developed, well-nourished, overweight, a bit weaker than Friday, conversant, wearing high-flow nasal O2 now  Psychiatric:  oriented to person, place and time; mood and affect appropriate for the situation   Eyes:  pupils equal, round and reactive to light; sclerae anicteric, conjunctivae not pale  ENT:  atraumatic; oral mucosa much less dry, a bit of mucoid secretions, no definite thrush now  Resp:  Decreased at the bases, rhonchi BL, maybe a few RLL crackles, no dense signs of consolidation  Cardiovascular:  heart regular, no gallop, no murmur; moderate extremity edema; no IV phlebitis (LUE PICC in place)  GI:  abdomen soft, NT, ND, normal bowel sounds, no palpable masses or organomegaly  Musculoskeletal:  no clubbing, cyanosis or petechiae; extremities with no gross effusions, joint misalignment or acute arthritis  Skin: warm, dry, normal turgor; no rash, no ulcers   ______________________________    Recent Labs     07/11/22  0552 07/10/22  0435 07/09/22  0439   WBC 10.0 10.4 12.3*   HGB 10.0* 10.0* 9.6*   HCT 30.5* 31.5* 30.0*   MCV 86.0 85.9 85.8    338 306     Lab Results   Component Value Date    CREATININE 1.7 (H) 07/11/2022     Lab Results   Component Value Date    LABALBU 2.9 (L) 07/10/2022     Lab Results   Component Value Date    ALT <5 (L) 06/23/2022    AST 14 (L) 06/23/2022    ALKPHOS 66 06/23/2022    BILITOT <0.2 06/23/2022      Lab Results   Component Value Date    LABA1C 6.4 09/27/2021     Other recent pertinent labs: Anion gap 13. Vanco level 19. WBC diff with 700 Eos.   ______________________________    Recent pertinent micro results: All BCx negative. MRSA swab negative. Bronch with just a couple of species of Candida.   ______________________________    Recent imaging results (last 7 days):     No results found.      Assessment:     Patient Active Problem List   Diagnosis Code    GERD (gastroesophageal reflux disease) K21.9    Essential hypertension I10    Hyperlipidemia E78.5    Restless leg syndrome G25.81    Anemia of chronic disease D63.8    Postural dizziness R42    Moderate persistent asthma J45.40    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-sided sciatica M54.41, G89.29    Morbid obesity with BMI of 40.0-44.9, adult ([facility]) E66.01, Z68.41    Type 2 diabetes mellitus with stage 4 chronic kidney disease, without long-term current use of insulin ([facility]) E11.22, N18.4    CAD (coronary artery disease) I25.10    Parkinson disease ([facility] G20    Severe obstructive sleep apnea G47.33    Hypothyroidism E03.9    Hepatic steatosis K76.0    Diverticulosis K57.90    Blind left eye H54.40    L carotid artery stenosis s/p CEA I65.29    Septicemia (HCC) A41.9    PAF (paroxysmal atrial fibrillation) (HCC) I48.0    COPD (chronic obstructive pulmonary disease) ([facility] P11.6    Diastolic congestive heart failure (HCC) I50.30    Partial symptomatic epilepsy with complex partial seizures, not intractable, without status epilepticus ([facility] G40.209    Chronic kidney disease N18.9    Acute kidney injury superimposed on CKD ([facility] N17.9, N18.9    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    Sialadenitis K11.20    Chest pain R07.9    Pulmonary HTN ([facility]) I27.20    Mitral valve insufficiency I34.0    C. difficile colitis A04.72    Laceration of left lower extremity S81.812A    Pulmonary nodules R91.8    Sensorineural hearing loss, bilateral H90.3    Aspiration pneumonia of left lower lobe ([facility]) J69.0    Mild protein-calorie malnutrition ([facility]) E44.1    Chronic anemia D64.9    Thrush B37.0    MRSA colonization Z22.322    BETSY (obstructive sleep apnea) T09.16    Metabolic acidosis X93.7    Pulmonary congestion R09.89    Mucus plugging of bronchi T17.500A    Atrial fibrillation, chronic ([facility]) I48.20     Assessment of today's active condition(s):      --          Background including prior tobacco use, asthma, morbid obesity, Parkinson's disease, dysphagia.     --          Admission 3 weeks ago with aspiration pneumonitis versus pneumonia, with acute respiratory failure; expected leukocytosis, lactate and PCT not impressive, but given how sick she was, completely reasonable to treat with Abx for actual bacterial pneumonia.     --          Another episode of sepsis and hypoxemia about 10-11 days ago, this time with a higher WBC count and higher PCT level, this time not quite so sick from a pulmonary standpoint (able to be managed with BiPAP, not intubation), but again, given the lab changes and the severity of the acute illness, and the findings on CT scan, I think very reasonable to treat with antibiotics for presumed nosocomial bacterial pneumonia, not just a sterile pneumonitis.  The vancomycin seems wise, given the MRSA colonization, and the meropenem to cover nosocomial GNRs empirically. Seemed to be making some slow, steady improvement earlier in the week, stagnation mid-week, improved after therapeutic bronch last Thursday, and now stagnating or regressing a bit more again, I think just as a function of whether her secretions are well-managed.     --          FARHEEN slowly resolving. Still significant edema.      --          Thrush, improved.      Treatment recs:     Can stop Abx for her aspiration pneumonia at this time. Can also stop FCZ for the recent thrush. Should still be in contact isolation for MRSA colonization, in my opinion. Ongoing efforts to try to mobilize secretions, manage plugging and atelectasis, prevent aspiration and pneumonia. Prognosis in that regard is very guarded. D/W her , and Dr. Susan Ty.      Electronically signed by Anmol Renee MD on 7/11/2022 at 11:44 AM.

## 2022-07-11 NOTE — ACP (ADVANCE CARE PLANNING)
Advance Care Planning   The patient has the following advanced directives on file:  Advance Directives     Power of [address] Will ACP-Advance Directive ACP-Power of     Not on File Filed on 09/10/18 Filed Not on File          The patient has appointed the following active healthcare agents:    Primary Decision Maker: Tito Ferreira Spouse - [phone]    The Patient has the following current code status:    Code Status: Limited    Visit Documentation:  I discussed Advance Care Planning with Tierra Spaulding today which included the patient's choices for care and treatment in the case of a health event that adversely affects decision-making abilities. She stated the Advance Care Directives on file are current. We discussed her current values, goals and care preferences at the end of life. 1. Goals of medical treatment were reviewed . 2. Patient's stated goal/treatment preference is LTAC. 3. Others present during the discussion pt's spouse   4. The discussion lasted 30 minutes. Palliative Care Initial Note  Palliative Care Admit date:07/11/2022    Advance Directives: Limited, no intubation/re-intubation at the time of arrest    Plan of care/goals: Chart reviewed. Met with pt and pt's spouse at bedside for code status discussion and goals of care. Pt and spouse are aware of palliative/hospice options and requesting referral to LTAC at this time. LTAC list provided to the pt. Pt [facility]. pt states does want CPR,medications, and defibrillation in case of emergency,however, does not want intubation/re-intubation. PC phone number provided to the family. PC following peripherally. Social/Spiritual: Prayer Support    Plan: [facility] for [facility]AC, referral called to [address] [place] [address].      Reason for consult:    _x__ Advance Care Planning  _x__ Transition of Care Planning  _x__ Psychosocial/Spiritual Support  ___ Symptom Management    Melisa Forman RN [address], RN  7/11/2022

## 2022-07-11 NOTE — PROGRESS NOTES
[facility] notified this RN of Pt's oxygen saturation at 82%. Went in to assess Pt. Oxygen at 77%. Pt asleep. Woken up by this RN. Alert and able to follow commands. Checked placement of Airvo. Called RT to place Pt back on bipap. Done so at this time.     Manda Howell RN

## 2022-07-11 NOTE — PROGRESS NOTES
07/10/22 2302   NIV Type   Skin Assessment Clean, dry, & intact   Skin Protection for O2 Device Yes   NIV Started/Stopped On   Equipment Type V60   Mode Bilevel   Mask Type Full face mask   Mask Size Medium   Settings/Measurements   IPAP 16 cmH20   CPAP/EPAP 8 cmH2O   Rate Ordered 16   Resp 20   FiO2  100 %   Vt Exhaled 469 mL   Mask Leak (lpm) 0 lpm   Comfort Level Good   Using Accessory Muscles No   SpO2 93   Patient's Home Machine No   Alarm Settings   Alarms On Y

## 2022-07-11 NOTE — PROGRESS NOTES
Hospitalist Progress Note      PCP: Lety Garland MD    Date of Admission: 6/17/2022    Hospital Course:   Admitted for resp failure from CHF , pna, and unable to manage own secretions with advanced parkinson's, eventually intubated    Prolonged vent support - there were discussions about trach and PEG support, though able to extubate eventually. Patient was extubated on 6/27/2022. She was then placed on BiPAP. Continued on O2 daytime and BiPAP HS. Transferred out of ICU    Secretions remain a major problem. Resp culture (BAL) growing candida - started antifungal.     Repeat emesis 7/1 and spiking high fever and worsening O2 requirement - she was transferred back to ICU on 7/1 overnight. Continued on BiPAP    7/7- S.p bronch with clearance of mucous plugs   S/p FEES by SLP team    Transferred out of ICU to PCU on 7/9      Subjective:   7/11- doing better. On Bipap. Wants bipap removed this am. No fever.       Medications:  Reviewed    Infusion Medications    sodium chloride      sodium chloride 5 mL/hr at 07/10/22 0747     Scheduled Medications    furosemide  40 mg IntraVENous BID    sodium bicarbonate  650 mg Oral Daily    potassium bicarb-citric acid  20 mEq Oral Daily    fluconazole  200 mg IntraVENous Q24H    acetylcysteine  600 mg Inhalation BID    metoprolol tartrate  50 mg Oral BID    ipratropium-albuterol  1 ampule Inhalation Q4H While awake    meropenem  1,000 mg IntraVENous Q12H    vancomycin (VANCOCIN) intermittent dosing (placeholder)   Other RX Placeholder    pantoprazole  40 mg IntraVENous BID    chlorhexidine  15 mL Mouth/Throat BID    sodium chloride flush  10 mL IntraVENous 2 times per day    apixaban  2.5 mg Oral BID    atorvastatin  40 mg Oral Nightly    carbidopa-levodopa  1 tablet Oral 4x Daily    DULoxetine  60 mg Oral Daily    lamoTRIgine  50 mg Oral BID    montelukast  10 mg Oral Daily    propafenone  150 mg Oral 3 times per day     PRN Meds: potassium chloride, magnesium sulfate, sodium phosphate IVPB **OR** sodium phosphate IVPB, morphine, carboxymethylcellulose PF, sodium chloride, sodium chloride flush, sodium chloride, promethazine **OR** ondansetron, acetaminophen **OR** acetaminophen, albuterol      Intake/Output Summary (Last 24 hours) at 7/11/2022 0723  Last data filed at 7/11/2022 0600  Gross per 24 hour   Intake 528.94 ml   Output 700 ml   Net -171.06 ml       Physical Exam Performed:    /68   Pulse 78   Temp 98.2 °F (36.8 °C) (Axillary)   Resp 17   Ht 5' 6\" (1.676 m)   Wt 250 lb 3.2 oz (113.5 kg)   SpO2 97%   BMI 40.38 kg/m²     General: elderly female. Awake and alert. Chronically ill appearing. Oriented. Appears to be not in any distress  Mucous Membranes:  Pink , anicteric  Left opaque cornea  Neck: No JVD, no carotid bruit, no thyromegaly  Chest: Diminished breath sounds with no wheezes or rhonchi  Cardiovascular:  RRR S1S2 heard, no murmurs or gallops  Abdomen:  Soft, obese  undistended, non tender, no organomegaly, BS present  Extremities: Left UE picc noted  No edema or cyanosis. Distal pulses well felt  Neurological : grossly normal with gen weakness         Labs:   Recent Labs     07/09/22  0439 07/10/22  0435 07/11/22  0552   WBC 12.3* 10.4 10.0   HGB 9.6* 10.0* 10.0*   HCT 30.0* 31.5* 30.5*    338 321     Recent Labs     07/09/22  0439 07/10/22  0435    143   K 3.9 4.1    100   CO2 28 31   BUN 29* 25*   CREATININE 1.6* 1.5*   CALCIUM 9.8 10.2   PHOS 3.9 3.8     No results for input(s): AST, ALT, BILIDIR, BILITOT, ALKPHOS in the last 72 hours. No results for input(s): INR in the last 72 hours. No results for input(s): Prudence Gazella in the last 72 hours.     Urinalysis:      Lab Results   Component Value Date/Time    NITRU Negative 07/01/2022 10:58 PM    [address] Jose Hawkins 0-2 07/01/2022 10:58 PM    BACTERIA 2+ 06/22/2022 04:10 PM    RBCUA 3-4 07/01/2022 10:58 PM    BLOODU TRACE-LYSED 07/01/2022 10:58 PM SPECGRAV 1.010 07/01/2022 10:58 PM    GLUCOSEU Negative 07/01/2022 10:58 PM       Radiology:  CT CHEST WO CONTRAST   Final Result   1. Multifocal bronchopneumonia, with progression since 06/17/2022.   2. Small bilateral pleural effusions. 3. Narrowing of the AP diameter of the trachea. 4. Mild cardiomegaly. XR CHEST PORTABLE   Final Result   Airspace disease within the mid to lower lung zones is slightly increased as   compared to prior. Possible small left pleural effusion. CT ABDOMEN PELVIS WO CONTRAST Additional Contrast? None   Final Result   1. Nonspecific, nonobstructive gaseous bowel pattern. 2. Extensive diverticulosis of the left colon, lacking acute inflammatory   changes. 3. Bibasilar consolidations, atelectasis versus pneumonia. Pleural effusions   have resolved since 06/23/2022. XR ABDOMEN (KUB) (SINGLE AP VIEW)   Final Result   1. Gaseous distention of the stomach, otherwise, unremarkable bowel gas   pattern. XR CHEST PORTABLE   Final Result   Stable chest.  Persistent left lower lobe opacification with probable   effusion. XR CHEST PORTABLE   Final Result   Persistent left lower lobe opacification and effusion. Interval clearing of   perihilar edema and right basilar opacification and effusion. XR CHEST PORTABLE   Final Result   No significant interval change in bilateral airspace disease and small   effusions. Unchanged positioning of support devices. XR CHEST PORTABLE   Final Result   Increasing basilar opacities with probable small effusions. Unchanged positioning of support devices. XR CHEST PORTABLE   Final Result   1. No significant change. XR CHEST PORTABLE   Final Result   No significant interval change in basilar predominant airspace disease and   small left greater than right pleural effusions. Pulmonary vascular   congestion.          CT ABDOMEN PELVIS WO CONTRAST Additional Contrast? None   Final Result   Colonic diverticulosis without acute diverticulitis. Nonspecific rectal wall   thickening. Partially visualized pleural effusions with adjacent compressive   atelectasis. XR CHEST PORTABLE   Final Result   Again identified is bibasilar airspace disease with small pleural effusions   and mild increased right perihilar infiltrates. This may be related to   atelectasis or pneumonia. Cardiomegaly. XR CHEST PORTABLE   Final Result   Stable examination with bilateral small effusions and bibasilar atelectasis. Stable ET tube and enteric tube. XR CHEST PORTABLE   Final Result   No acute process. Bibasilar hypoaeration      Stable cardiomegaly         IR PICC WO SQ PORT/PUMP > 5 YEARS   Final Result      XR CHEST PORTABLE   Final Result   Supportive tubing projects in stable position. Partial regression of atelectasis in the right lung base. Persisting   consolidation or or atelectasis in the left lung base. XR CHEST PORTABLE   Final Result   1. Endotracheal tube terminates in appropriate position above the nirali. 2.  Enteric tube terminates in the body of the stomach. 3.  Basilar opacities again demonstrated, right greater than left. Interval   improved aeration of the left lung base. XR CHEST PORTABLE   Final Result   Increasing opacity in the right base, lobar atelectasis versus accumulating   right pleural effusion. Combination of both entities or underlying pneumonia   also possible. Persisting left basilar airspace disease and small pleural effusion. XR CHEST PORTABLE   Final Result   Hazy bilateral lobe airspace opacities and small left pleural effusion. This   could represent atelectasis or pneumonia in the appropriate clinical setting. CT CHEST WO CONTRAST   Final Result   1. Respiratory motion in the lower lungs. The right base has linear and   dependent atelectasis.   The left does have atelectatic changes but could also   have some patchy opacities from pneumonia or aspiration at the left base. 2.  There is no pleural effusion or pneumothorax. 3.  Narrowing of the AP diameter of the trachea with bulging of the   membranous portion due to the gas distended but nondilated esophagus. XR CHEST PORTABLE   Final Result   1. Bibasilar heterogeneous opacities may represent subsegmental atelectasis   and/or mild pulmonary edema. Underlying infection is difficult to exclude. Short-term follow-up PA and lateral chest radiographs may be helpful for   further evaluation. 2.  Cardiomegaly. Assessment/Plan:    Principal Problem:    Septicemia ([address]  Active Problems:    Aspiration pneumonia of left lower lobe (HCC)    Mild protein-calorie malnutrition (HCC)    Chronic anemia    Thrush    MRSA colonization    BETSY (obstructive sleep apnea)    Metabolic acidosis    Pulmonary congestion    Mucus plugging of bronchi    Atrial fibrillation, chronic (HCC)    Parkinson disease (HCC)    Chronic kidney disease    Acute kidney injury superimposed on CKD ([address]  Resolved Problems:    Shortness of breath    Acute respiratory failure with hypoxia (HCC)         Acute Hypox Resp Failure   Aspiration PNA   Treated with  IV Zosyn and vancomycin. Changed Zosyn to cefepime. DCd vancomycin 6/22. Finished 10 days of Vanco and cefepime  Intubated 6/18. Extubated on 6/27/2022.  -On BiPAP. Alternate BiPAP with Airvo. - SLP involved. - resp culture from BAL is growing candida - started diflucan on 7/1/2022   - spiking high fever 7/1 - back on Abx with Vanc and merrem IV.    - now on 4 L-> 5L o2 , weaning as able               - poor physical condition limiting clearance of resp secretions- sp repeat bronch with clearing of mucous on 7/7/22 . On inhaled Mucomyst     Sepsis POA - due to above. Recurrent with aspiration  Improving with abx     Dysphagia.    Abnormal CT  Cough with every meal intake per family report. Underlying Parkinson's. - continues to struggle with secretions. - CT abd pelvis 7/2 - actually fairly unremarkable. - passed SLP and now tolerating diet - still risk for aspiration     Melena  GI bleed  - drop in H/H   -GI consulted  -H&H held stable.  -Has since resumed eliquis  - IV PPI BID      FARHEEN on CKD IIIB  -Likely secondary to ATN in the setting of pneumonia, hypotension and elevated vancomycin levels  -Had lasix gtt earlier this admission.  -Back on IV Lasix now, creatinine up to 1.7 today. Monitor BMP     Parkinson's disease   - on Sinemet. Afib Paroxysmal   -Rythmol   -Resumed Eliquis. HTN   - BP on the low side. Nifedipine stopped due to this and also leg edema. Consider alternative when restarting.     Generalized weakness and debility  -Consult PT OT     DVT Prophylaxis: Eliquis  eliquis held for bloody secretions seen during bronch and melena - and has since been resumed. Diet: ADULT DIET; Dysphagia - Pureed; 3 carb choices (45 gm/meal); Low Sodium (2 gm); Mildly Thick (Nectar)  ADULT ORAL NUTRITION SUPPLEMENT; Dinner, Lunch; Frozen Oral Supplement  Code Status: Limited   - DNI.        Mahogany Alvarado MD 7/11/2022 7:23 AM

## 2022-07-11 NOTE — PROGRESS NOTES
07/11/22 0307   NIV Type   Equipment Type V60   Mode Bilevel   Mask Type Full face mask   Mask Size Medium   Settings/Measurements   IPAP 16 cmH20   CPAP/EPAP 8 cmH2O   Rate Ordered 16   Resp 17   FiO2  100 %   Vt Exhaled 527 mL   Mask Leak (lpm) 18 lpm   Comfort Level Good   Using Accessory Muscles No   SpO2 94   Patient's Home Machine No   Alarm Settings   Alarms On Y

## 2022-07-11 NOTE — PROGRESS NOTES
PM assessment completed. Scheduled medications given per MAR. VSS vapotherm, A/O x4, oral care, bath, gown change, linen change. Patient denies any needs at this time. Call light in reach, will monitor, bed alarm on.

## 2022-07-12 ENCOUNTER — APPOINTMENT (OUTPATIENT)
Dept: GENERAL RADIOLOGY | Age: 82
DRG: 004 | End: 2022-07-12
Payer: MEDICARE

## 2022-07-12 PROBLEM — E43 SEVERE PROTEIN-CALORIE MALNUTRITION (HCC): Status: ACTIVE | Noted: 2022-06-24

## 2022-07-12 LAB
ANION GAP SERPL CALCULATED.3IONS-SCNC: 11 MMOL/L (ref 3–16)
BACTERIA: ABNORMAL /HPF
BASE EXCESS ARTERIAL: 14.3 MMOL/L (ref -3–3)
BASE EXCESS VENOUS: 11.7 MMOL/L (ref -3–3)
BASOPHILS ABSOLUTE: 0.1 K/UL (ref 0–0.2)
BASOPHILS RELATIVE PERCENT: 0.7 %
BILIRUBIN URINE: NEGATIVE
BLOOD, URINE: ABNORMAL
BUN BLDV-MCNC: 25 MG/DL (ref 7–20)
CALCIUM SERPL-MCNC: 10.3 MG/DL (ref 8.3–10.6)
CARBOXYHEMOGLOBIN ARTERIAL: 0.3 % (ref 0–1.5)
CARBOXYHEMOGLOBIN: 0.8 % (ref 0–1.5)
CHLORIDE BLD-SCNC: 95 MMOL/L (ref 99–110)
CLARITY: CLEAR
CO2: 34 MMOL/L (ref 21–32)
COLOR: ABNORMAL
CREAT SERPL-MCNC: 1.8 MG/DL (ref 0.6–1.2)
EOSINOPHILS ABSOLUTE: 0.7 K/UL (ref 0–0.6)
EOSINOPHILS RELATIVE PERCENT: 6.1 %
EPITHELIAL CELLS, UA: ABNORMAL /HPF (ref 0–5)
GFR AFRICAN AMERICAN: 33
GFR NON-AFRICAN AMERICAN: 27
GLUCOSE BLD-MCNC: 106 MG/DL (ref 70–99)
GLUCOSE BLD-MCNC: 89 MG/DL (ref 70–99)
GLUCOSE BLD-MCNC: 92 MG/DL (ref 70–99)
GLUCOSE BLD-MCNC: 94 MG/DL (ref 70–99)
GLUCOSE BLD-MCNC: 95 MG/DL (ref 70–99)
GLUCOSE BLD-MCNC: 96 MG/DL (ref 70–99)
GLUCOSE BLD-MCNC: 97 MG/DL (ref 70–99)
GLUCOSE URINE: NEGATIVE MG/DL
HCO3 ARTERIAL: 39.2 MMOL/L (ref 21–29)
HCO3 VENOUS: 35.5 MMOL/L (ref 23–29)
HCT VFR BLD CALC: 31.1 % (ref 36–48)
HEMOGLOBIN, ART, EXTENDED: 10.9 G/DL (ref 12–16)
HEMOGLOBIN: 9.9 G/DL (ref 12–16)
HYALINE CASTS: ABNORMAL /LPF (ref 0–2)
KETONES, URINE: NEGATIVE MG/DL
LEUKOCYTE ESTERASE, URINE: NEGATIVE
LYMPHOCYTES ABSOLUTE: 1.6 K/UL (ref 1–5.1)
LYMPHOCYTES RELATIVE PERCENT: 14.7 %
MAGNESIUM: 1.7 MG/DL (ref 1.8–2.4)
MCH RBC QN AUTO: 27.7 PG (ref 26–34)
MCHC RBC AUTO-ENTMCNC: 31.8 G/DL (ref 31–36)
MCV RBC AUTO: 87.1 FL (ref 80–100)
METHEMOGLOBIN ARTERIAL: 0.3 %
METHEMOGLOBIN VENOUS: 0.3 %
MICROSCOPIC EXAMINATION: YES
MONOCYTES ABSOLUTE: 0.9 K/UL (ref 0–1.3)
MONOCYTES RELATIVE PERCENT: 8 %
MUCUS: ABNORMAL /LPF
NEUTROPHILS ABSOLUTE: 7.9 K/UL (ref 1.7–7.7)
NEUTROPHILS RELATIVE PERCENT: 70.5 %
NITRITE, URINE: NEGATIVE
O2 SAT, ARTERIAL: 91.1 %
O2 SAT, VEN: 97 %
O2 THERAPY: ABNORMAL
O2 THERAPY: ABNORMAL
PCO2 ARTERIAL: 50.5 MMHG (ref 35–45)
PCO2, VEN: 43.2 MMHG (ref 40–50)
PDW BLD-RTO: 14 % (ref 12.4–15.4)
PERFORMED ON: ABNORMAL
PERFORMED ON: NORMAL
PH ARTERIAL: 7.51 (ref 7.35–7.45)
PH UA: 6 (ref 5–8)
PH VENOUS: 7.53 (ref 7.35–7.45)
PHOSPHORUS: 3.8 MG/DL (ref 2.5–4.9)
PLATELET # BLD: 311 K/UL (ref 135–450)
PMV BLD AUTO: 9 FL (ref 5–10.5)
PO2 ARTERIAL: 55.6 MMHG (ref 75–108)
PO2, VEN: 76.6 MMHG (ref 25–40)
POTASSIUM SERPL-SCNC: 3.7 MMOL/L (ref 3.5–5.1)
PROTEIN UA: NEGATIVE MG/DL
RBC # BLD: 3.57 M/UL (ref 4–5.2)
RBC UA: ABNORMAL /HPF (ref 0–4)
SODIUM BLD-SCNC: 140 MMOL/L (ref 136–145)
SPECIFIC GRAVITY UA: 1.01 (ref 1–1.03)
TCO2 ARTERIAL: 40.8 MMOL/L
TCO2 CALC VENOUS: 37 MMOL/L
URINE TYPE: ABNORMAL
UROBILINOGEN, URINE: 0.2 E.U./DL
WBC # BLD: 11.2 K/UL (ref 4–11)
WBC UA: ABNORMAL /HPF (ref 0–5)
YEAST: PRESENT /HPF

## 2022-07-12 PROCEDURE — 0B9J8ZX DRAINAGE OF LEFT LOWER LUNG LOBE, VIA NATURAL OR ARTIFICIAL OPENING ENDOSCOPIC, DIAGNOSTIC: ICD-10-PCS | Performed by: INTERNAL MEDICINE

## 2022-07-12 PROCEDURE — 71045 X-RAY EXAM CHEST 1 VIEW: CPT

## 2022-07-12 PROCEDURE — 6360000002 HC RX W HCPCS: Performed by: INTERNAL MEDICINE

## 2022-07-12 PROCEDURE — 87206 SMEAR FLUORESCENT/ACID STAI: CPT

## 2022-07-12 PROCEDURE — 94640 AIRWAY INHALATION TREATMENT: CPT

## 2022-07-12 PROCEDURE — 51798 US URINE CAPACITY MEASURE: CPT

## 2022-07-12 PROCEDURE — 99291 CRITICAL CARE FIRST HOUR: CPT | Performed by: INTERNAL MEDICINE

## 2022-07-12 PROCEDURE — 2580000003 HC RX 258: Performed by: INTERNAL MEDICINE

## 2022-07-12 PROCEDURE — 82803 BLOOD GASES ANY COMBINATION: CPT

## 2022-07-12 PROCEDURE — 80048 BASIC METABOLIC PNL TOTAL CA: CPT

## 2022-07-12 PROCEDURE — 94761 N-INVAS EAR/PLS OXIMETRY MLT: CPT

## 2022-07-12 PROCEDURE — 0BCB8ZZ EXTIRPATION OF MATTER FROM LEFT LOWER LOBE BRONCHUS, VIA NATURAL OR ARTIFICIAL OPENING ENDOSCOPIC: ICD-10-PCS | Performed by: INTERNAL MEDICINE

## 2022-07-12 PROCEDURE — 94003 VENT MGMT INPAT SUBQ DAY: CPT

## 2022-07-12 PROCEDURE — 99233 SBSQ HOSP IP/OBS HIGH 50: CPT | Performed by: INTERNAL MEDICINE

## 2022-07-12 PROCEDURE — 87205 SMEAR GRAM STAIN: CPT

## 2022-07-12 PROCEDURE — 87015 SPECIMEN INFECT AGNT CONCNTJ: CPT

## 2022-07-12 PROCEDURE — 0BH18EZ INSERTION OF ENDOTRACHEAL AIRWAY INTO TRACHEA, VIA NATURAL OR ARTIFICIAL OPENING ENDOSCOPIC: ICD-10-PCS | Performed by: INTERNAL MEDICINE

## 2022-07-12 PROCEDURE — 87070 CULTURE OTHR SPECIMN AEROBIC: CPT

## 2022-07-12 PROCEDURE — 83735 ASSAY OF MAGNESIUM: CPT

## 2022-07-12 PROCEDURE — 51702 INSERT TEMP BLADDER CATH: CPT

## 2022-07-12 PROCEDURE — 2000000000 HC ICU R&B

## 2022-07-12 PROCEDURE — 6370000000 HC RX 637 (ALT 250 FOR IP): Performed by: INTERNAL MEDICINE

## 2022-07-12 PROCEDURE — 94669 MECHANICAL CHEST WALL OSCILL: CPT

## 2022-07-12 PROCEDURE — 2700000000 HC OXYGEN THERAPY PER DAY

## 2022-07-12 PROCEDURE — 87116 MYCOBACTERIA CULTURE: CPT

## 2022-07-12 PROCEDURE — C9113 INJ PANTOPRAZOLE SODIUM, VIA: HCPCS | Performed by: INTERNAL MEDICINE

## 2022-07-12 PROCEDURE — 0BC78ZZ EXTIRPATION OF MATTER FROM LEFT MAIN BRONCHUS, VIA NATURAL OR ARTIFICIAL OPENING ENDOSCOPIC: ICD-10-PCS | Performed by: INTERNAL MEDICINE

## 2022-07-12 PROCEDURE — 36415 COLL VENOUS BLD VENIPUNCTURE: CPT

## 2022-07-12 PROCEDURE — 85025 COMPLETE CBC W/AUTO DIFF WBC: CPT

## 2022-07-12 PROCEDURE — 2500000003 HC RX 250 WO HCPCS: Performed by: INTERNAL MEDICINE

## 2022-07-12 PROCEDURE — 81001 URINALYSIS AUTO W/SCOPE: CPT

## 2022-07-12 PROCEDURE — 87102 FUNGUS ISOLATION CULTURE: CPT

## 2022-07-12 PROCEDURE — 0BC88ZZ EXTIRPATION OF MATTER FROM LEFT UPPER LOBE BRONCHUS, VIA NATURAL OR ARTIFICIAL OPENING ENDOSCOPIC: ICD-10-PCS | Performed by: INTERNAL MEDICINE

## 2022-07-12 PROCEDURE — 31622 DX BRONCHOSCOPE/WASH: CPT

## 2022-07-12 PROCEDURE — 84100 ASSAY OF PHOSPHORUS: CPT

## 2022-07-12 RX ORDER — MAGNESIUM SULFATE IN WATER 40 MG/ML
2000 INJECTION, SOLUTION INTRAVENOUS ONCE
Status: COMPLETED | OUTPATIENT
Start: 2022-07-12 | End: 2022-07-12

## 2022-07-12 RX ORDER — FENTANYL CITRATE 50 UG/ML
50 INJECTION, SOLUTION INTRAMUSCULAR; INTRAVENOUS
Status: DISCONTINUED | OUTPATIENT
Start: 2022-07-12 | End: 2022-07-18

## 2022-07-12 RX ORDER — PROPOFOL 10 MG/ML
INJECTION, EMULSION INTRAVENOUS
Status: DISPENSED
Start: 2022-07-12 | End: 2022-07-12

## 2022-07-12 RX ORDER — PROPOFOL 10 MG/ML
5-50 INJECTION, EMULSION INTRAVENOUS CONTINUOUS
Status: DISCONTINUED | OUTPATIENT
Start: 2022-07-12 | End: 2022-07-13

## 2022-07-12 RX ORDER — LIDOCAINE HYDROCHLORIDE 40 MG/ML
SOLUTION TOPICAL
Status: DISPENSED
Start: 2022-07-12 | End: 2022-07-12

## 2022-07-12 RX ORDER — 0.9 % SODIUM CHLORIDE 0.9 %
1000 INTRAVENOUS SOLUTION INTRAVENOUS ONCE
Status: COMPLETED | OUTPATIENT
Start: 2022-07-12 | End: 2022-07-12

## 2022-07-12 RX ORDER — MIDAZOLAM HYDROCHLORIDE 1 MG/ML
2 INJECTION INTRAMUSCULAR; INTRAVENOUS
Status: DISCONTINUED | OUTPATIENT
Start: 2022-07-12 | End: 2022-07-18

## 2022-07-12 RX ORDER — FUROSEMIDE 10 MG/ML
80 INJECTION INTRAMUSCULAR; INTRAVENOUS 3 TIMES DAILY
Status: DISCONTINUED | OUTPATIENT
Start: 2022-07-12 | End: 2022-07-13

## 2022-07-12 RX ADMIN — IPRATROPIUM BROMIDE AND ALBUTEROL SULFATE 1 AMPULE: 2.5; .5 SOLUTION RESPIRATORY (INHALATION) at 19:43

## 2022-07-12 RX ADMIN — IPRATROPIUM BROMIDE AND ALBUTEROL SULFATE 1 AMPULE: 2.5; .5 SOLUTION RESPIRATORY (INHALATION) at 08:31

## 2022-07-12 RX ADMIN — MIDAZOLAM HYDROCHLORIDE 2 MG: 2 INJECTION, SOLUTION INTRAMUSCULAR; INTRAVENOUS at 17:15

## 2022-07-12 RX ADMIN — CARBIDOPA AND LEVODOPA 1 TABLET: 25; 100 TABLET ORAL at 16:20

## 2022-07-12 RX ADMIN — IPRATROPIUM BROMIDE AND ALBUTEROL SULFATE 1 AMPULE: 2.5; .5 SOLUTION RESPIRATORY (INHALATION) at 12:00

## 2022-07-12 RX ADMIN — SODIUM CHLORIDE 1000 ML: 9 INJECTION, SOLUTION INTRAVENOUS at 10:56

## 2022-07-12 RX ADMIN — POTASSIUM CHLORIDE 20 MEQ: 29.8 INJECTION INTRAVENOUS at 10:35

## 2022-07-12 RX ADMIN — IPRATROPIUM BROMIDE AND ALBUTEROL SULFATE 1 AMPULE: 2.5; .5 SOLUTION RESPIRATORY (INHALATION) at 15:53

## 2022-07-12 RX ADMIN — CARBIDOPA AND LEVODOPA 1 TABLET: 25; 100 TABLET ORAL at 20:56

## 2022-07-12 RX ADMIN — SODIUM CHLORIDE, PRESERVATIVE FREE 10 ML: 5 INJECTION INTRAVENOUS at 20:59

## 2022-07-12 RX ADMIN — PANTOPRAZOLE SODIUM 40 MG: 40 INJECTION, POWDER, LYOPHILIZED, FOR SOLUTION INTRAVENOUS at 08:13

## 2022-07-12 RX ADMIN — IPRATROPIUM BROMIDE AND ALBUTEROL SULFATE 1 AMPULE: 2.5; .5 SOLUTION RESPIRATORY (INHALATION) at 23:10

## 2022-07-12 RX ADMIN — FUROSEMIDE 40 MG: 10 INJECTION, SOLUTION INTRAMUSCULAR; INTRAVENOUS at 08:14

## 2022-07-12 RX ADMIN — LAMOTRIGINE 50 MG: 25 TABLET ORAL at 20:56

## 2022-07-12 RX ADMIN — MAGNESIUM SULFATE HEPTAHYDRATE 2000 MG: 40 INJECTION, SOLUTION INTRAVENOUS at 12:44

## 2022-07-12 RX ADMIN — Medication 15 ML: at 00:26

## 2022-07-12 RX ADMIN — ATORVASTATIN CALCIUM 40 MG: 40 TABLET, FILM COATED ORAL at 20:59

## 2022-07-12 RX ADMIN — PROPOFOL 15 MCG/KG/MIN: 10 INJECTION, EMULSION INTRAVENOUS at 10:45

## 2022-07-12 RX ADMIN — SODIUM CHLORIDE, PRESERVATIVE FREE 10 ML: 5 INJECTION INTRAVENOUS at 08:13

## 2022-07-12 RX ADMIN — PROPOFOL 30 MCG/KG/MIN: 10 INJECTION, EMULSION INTRAVENOUS at 19:55

## 2022-07-12 RX ADMIN — NOREPINEPHRINE BITARTRATE 1 MCG/MIN: 1 INJECTION INTRAVENOUS at 18:53

## 2022-07-12 RX ADMIN — PANTOPRAZOLE SODIUM 40 MG: 40 INJECTION, POWDER, LYOPHILIZED, FOR SOLUTION INTRAVENOUS at 20:56

## 2022-07-12 RX ADMIN — PROPOFOL 30 MCG/KG/MIN: 10 INJECTION, EMULSION INTRAVENOUS at 14:54

## 2022-07-12 RX ADMIN — MIDAZOLAM HYDROCHLORIDE 2 MG: 2 INJECTION, SOLUTION INTRAMUSCULAR; INTRAVENOUS at 20:55

## 2022-07-12 RX ADMIN — Medication 15 ML: at 20:56

## 2022-07-12 RX ADMIN — ACETYLCYSTEINE 600 MG: 200 INHALANT RESPIRATORY (INHALATION) at 08:30

## 2022-07-12 RX ADMIN — ACETYLCYSTEINE 600 MG: 200 INHALANT RESPIRATORY (INHALATION) at 19:43

## 2022-07-12 RX ADMIN — PROPAFENONE HYDROCHLORIDE 150 MG: 150 TABLET, FILM COATED ORAL at 21:01

## 2022-07-12 RX ADMIN — Medication 15 ML: at 08:14

## 2022-07-12 RX ADMIN — DULOXETINE HYDROCHLORIDE 60 MG: 60 CAPSULE, DELAYED RELEASE ORAL at 16:20

## 2022-07-12 ASSESSMENT — PULMONARY FUNCTION TESTS
PIF_VALUE: 35
PIF_VALUE: 33
PIF_VALUE: 35
PIF_VALUE: 35

## 2022-07-12 NOTE — PROGRESS NOTES
RT Inhaler-Nebulizer Bronchodilator Protocol Note    There is a bronchodilator order in the chart from a provider indicating to follow the RT Bronchodilator Protocol and there is an Initiate RT Inhaler-Nebulizer Bronchodilator Protocol order as well (see protocol at bottom of note). CXR Findings:  XR CHEST PORTABLE    Result Date: 7/12/2022  Lines and tubes in satisfactory position with ill-defined opacifications in the left lung base and small to moderate size residual left pleural effusion. XR CHEST PORTABLE    Result Date: 7/12/2022  1. Endotracheal tube terminates in appropriate position above the nirali. The enteric tube courses off the field of view in the upper abdomen. 2. Improved aeration of the left upper lung field with persistent consolidation and/or effusion opacifying the left mid and lower lung fields. XR CHEST PORTABLE    Result Date: 7/12/2022  Persistent complete opacification of the left hemithorax. XR CHEST PORTABLE    Result Date: 7/11/2022  Interval complete opacification of the left hemithorax. The findings from the last RT Protocol Assessment were as follows:   History Pulmonary Disease: Chronic pulmonary disease  Respiratory Pattern: Dyspnea on exertion or RR 21-25 bpm  Breath Sounds: Slightly diminished and/or crackles  Cough: Strong, productive  Indication for Bronchodilator Therapy: Mucolytic ordered  Bronchodilator Assessment Score: 7    Aerosolized bronchodilator medication orders have been revised according to the RT Inhaler-Nebulizer Bronchodilator Protocol below. Respiratory Therapist to perform RT Therapy Protocol Assessment initially then follow the protocol. Repeat RT Therapy Protocol Assessment PRN for score 0-3 or on second treatment, BID, and PRN for scores above 3. No Indications - adjust the frequency to every 6 hours PRN wheezing or bronchospasm, if no treatments needed after 48 hours then discontinue using Per Protocol order mode.      If indication present, adjust the RT bronchodilator orders based on the Bronchodilator Assessment Score as indicated below. Use Inhaler orders unless patient has one or more of the following: on home nebulizer, not able to hold breath for 10 seconds, is not alert and oriented, cannot activate and use MDI correctly, or respiratory rate 25 breaths per minute or more, then use the equivalent nebulizer order(s) with same Frequency and PRN reasons based on the score. If a patient is on this medication at home then do not decrease Frequency below that used at home. 0-3 - enter or revise RT bronchodilator order(s) to equivalent RT Bronchodilator order with Frequency of every 4 hours PRN for wheezing or increased work of breathing using Per Protocol order mode. 4-6 - enter or revise RT Bronchodilator order(s) to two equivalent RT bronchodilator orders with one order with BID Frequency and one order with Frequency of every 4 hours PRN wheezing or increased work of breathing using Per Protocol order mode. 7-10 - enter or revise RT Bronchodilator order(s) to two equivalent RT bronchodilator orders with one order with TID Frequency and one order with Frequency of every 4 hours PRN wheezing or increased work of breathing using Per Protocol order mode. 11-13 - enter or revise RT Bronchodilator order(s) to one equivalent RT bronchodilator order with QID Frequency and an Albuterol order with Frequency of every 4 hours PRN wheezing or increased work of breathing using Per Protocol order mode. Greater than 13 - enter or revise RT Bronchodilator order(s) to one equivalent RT bronchodilator order with every 4 hours Frequency and an Albuterol order with Frequency of every 2 hours PRN wheezing or increased work of breathing using Per Protocol order mode.        Electronically signed by Hiram Boo RCP on 7/12/2022 at 7:45 PM

## 2022-07-12 NOTE — PROGRESS NOTES
Physical Therapy/Occupational Therapy    Patient was intubated today and placed on Ventilator due to decreased SpO2 levels. Patient will need orders when able to participate in PT/OT session. No Charge.      Ezequiel El, MS PT, # [id number]  Mahesh Rodriguez OTR/JAIMIE [id number]'

## 2022-07-12 NOTE — PROGRESS NOTES
Order for rapid sequence intubation obtained. Patient pre-oxygenated to with BiPAP to a saturation 96 %. 16/8     5 mg of Versed ordered and administered at 10:57 AM     20 mg of Etomidate ordered and administered at 10:57 AM           Recent Labs     07/10/22  0435 07/11/22  0552 07/12/22  0354    143 140   K 4.1 4.3 3.7   BUN 25* 25* 25*   CREATININE 1.5* 1.7* 1.8*   MG 2.10 1.80 1.70*         Dr Brianna Kelley inserted a number  7.5 ET tube. The ET tube is secured at 24 cm measured at the patients lip line at 10:59 AM. Breath sounds are equal bilaterally. There is a positive color change noted in the colorimetric CO2 detector. RT connected the patient to a ventilator. See orders for ventilator orders. **Bedside bronch. 11:05 AM  5 mg of Versed ordered and administered at 11:09 AM  11:12 AM ETT pulled back 22 LL      OG tube inserted to a depth of 70 cm. Ausculation of air bolus is positive. Aspirated stomach contents are tan. POST Intubation ORDERS    ABG ordered in 2 hours  Portable Chest x-ray ordered to confirm placement of the patients ET tube and gastric tube. Bilateral wrist restraints ordered and initiated. Pulses present distal to restraints. Propofol drip ordered for sedation. See EMAR and orders. See physician note to follow.  Electronically signed by Zane Hanson RN on 7/12/2022 at 11:12 AM   \

## 2022-07-12 NOTE — PROCEDURES
Intubation  Roxanne Padilla 7/12/2022 [id number]  Performed by:COLLEEN MACDONALD MD    Consent: The procedure was performed in an emergent situation. Indications: respiratory failure, airway protection, hypoxemia and hypercapnia  Intubation method: direct  Patient status: sedated  Pretreatment medications: midazolam  Laryngoscope size: Glidoscope   Tube size: 7.5 mm  Tube type: uncuffed  Number of attempts: 1  Ventilation between attempts: BVM  Cords visualized: yes  Post-procedure assessment: ETCO2 monitor and chest rise  Breath sounds: equal  Cuff inflated: yes   Tube secured at 23 cm at the lip with: ETT hughes  Chest x-ray was ordered to confirm placement. Patient tolerance: Patient tolerated the procedure well with no immediate complications.

## 2022-07-12 NOTE — PROGRESS NOTES
 propafenone  150 mg Oral 3 times per day     PRN Meds: potassium chloride, magnesium sulfate, sodium phosphate IVPB **OR** sodium phosphate IVPB, morphine, carboxymethylcellulose PF, sodium chloride, sodium chloride flush, sodium chloride, promethazine **OR** ondansetron, acetaminophen **OR** acetaminophen, albuterol      Intake/Output Summary (Last 24 hours) at 7/12/2022 0827  Last data filed at 7/11/2022 1700  Gross per 24 hour   Intake 240 ml   Output 1300 ml   Net -1060 ml       Physical Exam Performed:    BP (!) 164/99   Pulse (!) 108   Temp 98.6 °F (37 °C) (Axillary)   Resp 27   Ht 5' 6\" (1.676 m)   Wt 250 lb 3.2 oz (113.5 kg)   SpO2 95%   BMI 40.38 kg/m²     General: elderly female. Awake and alert. Chronically ill appearing. Oriented. On BiPAP  Mucous Membranes:  Pink , anicteric  Left opaque cornea  Neck: No JVD, no carotid bruit, no thyromegaly  Chest: Diminished breath sounds with diffuse rhonchi. No wheezes  Cardiovascular:  RRR S1S2 heard, no murmurs or gallops  Abdomen:  Soft, obese  undistended, non tender, no organomegaly, BS present  Extremities: Left UE picc noted  No edema or cyanosis. Distal pulses well felt  Neurological : grossly normal with gen weakness         Labs:   Recent Labs     07/10/22  0435 07/11/22  0552 07/12/22  0354   WBC 10.4 10.0 11.2*   HGB 10.0* 10.0* 9.9*   HCT 31.5* 30.5* 31.1*    321 311     Recent Labs     07/10/22  0435 07/11/22  0552 07/12/22  0354    143 140   K 4.1 4.3 3.7    97* 95*   CO2 31 33* 34*   BUN 25* 25* 25*   CREATININE 1.5* 1.7* 1.8*   CALCIUM 10.2 10.1 10.3   PHOS 3.8 4.3 3.8     No results for input(s): AST, ALT, BILIDIR, BILITOT, ALKPHOS in the last 72 hours. No results for input(s): INR in the last 72 hours. No results for input(s): Janki Height in the last 72 hours.     Urinalysis:      Lab Results   Component Value Date/Time    NITRU Negative 07/01/2022 10:58 PM    WBCUA 0-2 07/01/2022 10:58 PM    BACTERIA 2+ 06/22/2022 04:10 PM    RBCUA 3-4 07/01/2022 10:58 PM    BLOODU TRACE-LYSED 07/01/2022 10:58 PM    SPECGRAV 1.010 07/01/2022 10:58 PM    GLUCOSEU Negative 07/01/2022 10:58 PM       Radiology:  XR CHEST PORTABLE   Final Result   Interval complete opacification of the left hemithorax. CT CHEST WO CONTRAST   Final Result   1. Multifocal bronchopneumonia, with progression since 06/17/2022.   2. Small bilateral pleural effusions. 3. Narrowing of the AP diameter of the trachea. 4. Mild cardiomegaly. XR CHEST PORTABLE   Final Result   Airspace disease within the mid to lower lung zones is slightly increased as   compared to prior. Possible small left pleural effusion. CT ABDOMEN PELVIS WO CONTRAST Additional Contrast? None   Final Result   1. Nonspecific, nonobstructive gaseous bowel pattern. 2. Extensive diverticulosis of the left colon, lacking acute inflammatory   changes. 3. Bibasilar consolidations, atelectasis versus pneumonia. Pleural effusions   have resolved since 06/23/2022. XR ABDOMEN (KUB) (SINGLE AP VIEW)   Final Result   1. Gaseous distention of the stomach, otherwise, unremarkable bowel gas   pattern. XR CHEST PORTABLE   Final Result   Stable chest.  Persistent left lower lobe opacification with probable   effusion. XR CHEST PORTABLE   Final Result   Persistent left lower lobe opacification and effusion. Interval clearing of   perihilar edema and right basilar opacification and effusion. XR CHEST PORTABLE   Final Result   No significant interval change in bilateral airspace disease and small   effusions. Unchanged positioning of support devices. XR CHEST PORTABLE   Final Result   Increasing basilar opacities with probable small effusions. Unchanged positioning of support devices. XR CHEST PORTABLE   Final Result   1. No significant change.          XR CHEST PORTABLE   Final Result   No significant interval change in basilar predominant airspace disease and   small left greater than right pleural effusions. Pulmonary vascular   congestion. CT ABDOMEN PELVIS WO CONTRAST Additional Contrast? None   Final Result   Colonic diverticulosis without acute diverticulitis. Nonspecific rectal wall   thickening. Partially visualized pleural effusions with adjacent compressive   atelectasis. XR CHEST PORTABLE   Final Result   Again identified is bibasilar airspace disease with small pleural effusions   and mild increased right perihilar infiltrates. This may be related to   atelectasis or pneumonia. Cardiomegaly. XR CHEST PORTABLE   Final Result   Stable examination with bilateral small effusions and bibasilar atelectasis. Stable ET tube and enteric tube. XR CHEST PORTABLE   Final Result   No acute process. Bibasilar hypoaeration      Stable cardiomegaly         IR PICC WO SQ PORT/PUMP > 5 YEARS   Final Result      XR CHEST PORTABLE   Final Result   Supportive tubing projects in stable position. Partial regression of atelectasis in the right lung base. Persisting   consolidation or or atelectasis in the left lung base. XR CHEST PORTABLE   Final Result   1. Endotracheal tube terminates in appropriate position above the nirali. 2.  Enteric tube terminates in the body of the stomach. 3.  Basilar opacities again demonstrated, right greater than left. Interval   improved aeration of the left lung base. XR CHEST PORTABLE   Final Result   Increasing opacity in the right base, lobar atelectasis versus accumulating   right pleural effusion. Combination of both entities or underlying pneumonia   also possible. Persisting left basilar airspace disease and small pleural effusion. XR CHEST PORTABLE   Final Result   Hazy bilateral lobe airspace opacities and small left pleural effusion.   This   could represent atelectasis or pneumonia in the appropriate clinical setting. CT CHEST WO CONTRAST   Final Result   1. Respiratory motion in the lower lungs. The right base has linear and   dependent atelectasis. The left does have atelectatic changes but could also   have some patchy opacities from pneumonia or aspiration at the left base. 2.  There is no pleural effusion or pneumothorax. 3.  Narrowing of the AP diameter of the trachea with bulging of the   membranous portion due to the gas distended but nondilated esophagus. XR CHEST PORTABLE   Final Result   1. Bibasilar heterogeneous opacities may represent subsegmental atelectasis   and/or mild pulmonary edema. Underlying infection is difficult to exclude. Short-term follow-up PA and lateral chest radiographs may be helpful for   further evaluation. 2.  Cardiomegaly. Assessment/Plan:    Principal Problem:    Septicemia ([address]  Active Problems:    Aspiration pneumonia of left lower lobe (HCC)    Mild protein-calorie malnutrition (HCC)    Chronic anemia    Thrush    MRSA colonization    BETSY (obstructive sleep apnea)    Metabolic acidosis    Pulmonary congestion    Mucus plugging of bronchi    Atrial fibrillation, chronic (HCC)    Parkinson disease (HCC)    Chronic kidney disease    Acute kidney injury superimposed on CKD ([address]  Resolved Problems:    Shortness of breath    Acute respiratory failure with hypoxia (HCC)         Acute Hypox Resp Failure   Aspiration PNA   Treated with  IV Zosyn and vancomycin. Changed Zosyn to cefepime. DCd vancomycin 6/22. Finished 10 days of Vanco and cefepime  Intubated 6/18. Extubated on 6/27/2022.  -On BiPAP. Alternate BiPAP with Airvo. - SLP involved.     - resp culture from BAL is growing candida - started diflucan on 7/1/2022   - spiking high fever 7/1 - back on Abx with Vanc and merrem IV.    - now on 4 L-> 5L o2 , weaning as able               - poor physical condition limiting clearance of resp secretions- sp repeat bronch with clearing of mucous on 7/7/22 . On inhaled Mucomyst     7/12  Transferred back to ICU for worsening respiratory status. Chest x-ray showed complete opacification left hemithorax. Placed on BiPAP. The plan is to intubate her and do a bronchoscopy. Currently not on any antibiotics for    Sepsis POA - due to above. Recurrent with aspiration  Now off antibiotics    Dysphagia. Abnormal CT  Cough with every meal intake per family report. Underlying Parkinson's. - continues to struggle with secretions. - CT abd pelvis 7/2 - actually fairly unremarkable. - passed SLP and now tolerating diet - still risk for aspiration     Melena  GI bleed  - drop in H/H   -GI consulted  -H&H held stable.  -Has since resumed eliquis  - IV PPI BID      FARHEEN on CKD IIIB  -Likely secondary to ATN in the setting of pneumonia, hypotension and elevated vancomycin levels  -Had lasix gtt earlier this admission.  -Back on IV Lasix now, creatinine up to 1.8 today. Monitor BMP  Hold Lasix     Parkinson's disease   - on Sinemet. Afib Paroxysmal   -Rythmol   -Resumed Eliquis. HTN   - BP on the low side. Nifedipine stopped due to this and also leg edema. Consider alternative when restarting.     Generalized weakness and debility  -Consult PT OT     DVT Prophylaxis: Eliquis  eliquis held for bloody secretions seen during bronch and melena - and has since been resumed. Diet: ADULT DIET; Dysphagia - Pureed; 3 carb choices (45 gm/meal); Low Sodium (2 gm); Mildly Thick (Nectar)  ADULT ORAL NUTRITION SUPPLEMENT; Dinner, Lunch; Frozen Oral Supplement  Code Status: Full Code   - DNI.        Steve Roberts MD 7/12/2022 8:27 AM

## 2022-07-12 NOTE — PROGRESS NOTES
Reassessment completed (see Flowsheet). All ICU lines remain intact, ICU monitoring continued-   Infusing:  Propofol/ NS- Kvo (See mar)    pt Hypotensive (See Flowsheet) Post intubation and Bronch. Discussed with Dr Leo Ronquillo- Verbal for 1L bolus. Lung sounds Diminished  Spouse at bedside and updated. Continuing to monitor.

## 2022-07-12 NOTE — PROGRESS NOTES
Pt transferred to ICU bed 14. Updated staff on careplan and potential Intubation. Pt currently on Bipap; maxed out. SpO2 94. Pt very anxious. Comfort/ emotional care provided. Family in waiting room. Updated on care plan and brought to Pt's bedside.      Joselito Roman RN

## 2022-07-12 NOTE — CARE COORDINATION
INTERDISCIPLINARY PLAN OF CARE CONFERENCE    Date/Time: 7/12/2022 11:22 AM  Completed by: JENNIFER Street   Case Management      Patient Name:  Kelly Chandra  YOB: 1940  Admitting Diagnosis: Shortness of breath [R06.02]  Septicemia ([facility] [A41.9]  Chronic anemia [D64.9]  Acute respiratory failure with hypoxia ([facility] [J96.01]  Aspiration pneumonia of left lower lobe, unspecified aspiration pneumonia type ([facility] [J69.0]  Chronic kidney disease, unspecified CKD stage [N18.9]     Admit Date/Time:  6/17/2022 12:50 AM    Chart reviewed. Interdisciplinary team contacted or reviewed plan related to patient progress and discharge plans. Disciplines included Case Management, Nursing, and Dietitian. Current Status:ongoing   PT/OT recommendation for discharge plan of care: will need new orders     [facility]    Discharge Plan Comments: Chart review completed. Spoke with Norma Villafuerte at [place] who states pt meets criteria for [facility] and [facility] have beds. Per chart review, pt recently intubated.  At 12:47pm: Norma Villafuerte at [address] [place] [address] Dariusz aware pt is now on a Vent    Home O2 in place on admit: Yes

## 2022-07-12 NOTE — PROGRESS NOTES
Discussed with patient and spouse prior to Intubation- wishes on how to proceed. Pt wishes to \"be kept alive, by any means\" even if that means a trach/ Peg. Spouse present and agreeable to discussion. Pt asked if she had a living will in case she does not \"do-well with intubation or trach\" patient stated \"yes, my  knows\". Per  patient has a living will.      Dr Eleanor meza informed

## 2022-07-12 NOTE — PROGRESS NOTES
Speech Language Pathology  SLP Attempt Note        Name: Blake Omer  : 1940  Medical Diagnosis: Shortness of breath [R06.02]  Septicemia ([facility] [address] [A41.9]  Chronic anemia [D64.9]  Acute respiratory failure with hypoxia (HCC) [J96.01]  Aspiration pneumonia of left lower lobe, unspecified aspiration pneumonia type ([address] [J69.0]  Chronic kidney disease, unspecified CKD stage [N18.9]    Pt re-intubated today. Please re-consult once extubated. No charges filed.  Thank you,    Allison Lewis M.A., [address] [facility]  Speech-Language Pathologist  Phone: [phone]

## 2022-07-12 NOTE — PROGRESS NOTES
[facility] Progress Note           Subjective/   80y.o. year old female who we are seeing in consultation for FARHEEN/CKD. HPI:  Patient was seen and examined. Moved to the ICU for hypoxia. Now on bilevel. Metolazone yesterday. Good urine output and stable renal function. ROS: No fever or chills. + edema.   Good urine volume   Social:   at bedside     Objective/   GEN:  Chronically ill, BP (!) 71/38   Pulse 98   Temp 99.2 °F (37.3 °C) (Axillary)   Resp 20   Ht 5' 6\" (1.676 m)   Wt 250 lb 3.2 oz (113.5 kg)   SpO2 94%   BMI 40.38 kg/m²    From 7/4  HEENT: non-icteric, atraumatic  CV: S1, S2 without r/g; + LE edema  RESP: Decreased breath sounds, no wheezing, mild distress  ABD: +bs, soft, nt, no hsm  SKIN: warm, no rashes    Data/  Recent Labs     07/10/22  0435 07/11/22  0552 07/12/22  0354   WBC 10.4 10.0 11.2*   HGB 10.0* 10.0* 9.9*   HCT 31.5* 30.5* 31.1*   MCV 85.9 86.0 87.1    321 311     Recent Labs     07/10/22  0435 07/11/22  0552 07/12/22  0354    143 140   K 4.1 4.3 3.7    97* 95*   CO2 31 33* 34*   GLUCOSE 104* 94 92   PHOS 3.8 4.3 3.8   MG 2.10 1.80 1.70*   BUN 25* 25* 25*   CREATININE 1.5* 1.7* 1.8*   LABGLOM 33* 29* 27*   GFRAA 40* 35* 33*     Component      Latest Ref Rng & Units 6/22/2022 6/22/2022           4:10 PM  4:10 PM   Color, UA      Straw/Yellow Yellow    Clarity, UA      Clear Clear    Glucose, UA      Negative mg/dL Negative    Bilirubin, Urine      Negative Negative    Ketones, Urine      Negative mg/dL TRACE (A)    Specific Gravity, UA      1.005 - 1.030 1.025    Blood, Urine      Negative Negative    pH, UA      5.0 - 8.0 5.5    Protein, UA      Negative mg/dL TRACE (A)    Urobilinogen, Urine      <2.0 E.U./dL 0.2    Nitrite, Urine      Negative Negative    Leukocyte Esterase, Urine      Negative Negative    Microscopic Examination       YES    Urine Type       NotGiven    Mucus, UA      None Seen /LPF Rare (A)    WBC, UA      0 - 5 /HPF 10-20 (A)    RBC, UA      0 - 4 /HPF 3-4    Epithelial Cells, UA      0 - 5 /HPF 0-1    Bacteria, UA      None Seen /HPF 2+ (A)    Sodium, Ur      Not Established mmol/L  <20   Potassium, Ur      Not Established mmol/L  26.3   Chloride      Not Established mmol/L  <20       Assessment/     - Acute kidney injury - multi-factorial ATN injury in setting of aspiration PNA, hypotension, & elevated vancomycin levels. Scr is up to 1.7 with diuresis    She is still fluid overloaded with worsening oxygen requirements     - Chronic kidney disease stage 3b - background DM/HTN/CHF              SCr 1.6              Follows with [facility]     - Acute hypoxemic respiratory failure/Aspiration PNA/Pulmonary edema    Worsening, now on bilevel. Planning for bronch      - Diastolic CHF     - Atrial fibrillation    - Anemia - prn prbc's     - Hypertension   Range is ok     - Hypokalemia, improved. -Metabolic acidosis, resolved    -Dysphagia, patient has been started on some puréed food    Plan/     Increase lasix to 80 mg IV TID   Follow labs  Follow in/outs     ____________________________________  Nasrin Virk MD  [facility]  [email or web address]  Office: [phone]

## 2022-07-12 NOTE — PROGRESS NOTES
RT Inhaler-Nebulizer Bronchodilator Protocol Note    There is a bronchodilator order in the chart from a provider indicating to follow the RT Bronchodilator Protocol and there is an Initiate RT Inhaler-Nebulizer Bronchodilator Protocol order as well (see protocol at bottom of note). CXR Findings:  XR CHEST PORTABLE    Result Date: 7/11/2022  Interval complete opacification of the left hemithorax. The findings from the last RT Protocol Assessment were as follows:   History Pulmonary Disease: (P) Chronic pulmonary disease  Respiratory Pattern: (P) Dyspnea on exertion or RR 21-25 bpm  Breath Sounds: (P) Slightly diminished and/or crackles  Cough: (P) Weak, productive  Indication for Bronchodilator Therapy: (P) On home bronchodilators  Bronchodilator Assessment Score: (P) 8    Aerosolized bronchodilator medication orders have been revised according to the RT Inhaler-Nebulizer Bronchodilator Protocol below. Respiratory Therapist to perform RT Therapy Protocol Assessment initially then follow the protocol. Repeat RT Therapy Protocol Assessment PRN for score 0-3 or on second treatment, BID, and PRN for scores above 3. No Indications - adjust the frequency to every 6 hours PRN wheezing or bronchospasm, if no treatments needed after 48 hours then discontinue using Per Protocol order mode. If indication present, adjust the RT bronchodilator orders based on the Bronchodilator Assessment Score as indicated below. Use Inhaler orders unless patient has one or more of the following: on home nebulizer, not able to hold breath for 10 seconds, is not alert and oriented, cannot activate and use MDI correctly, or respiratory rate 25 breaths per minute or more, then use the equivalent nebulizer order(s) with same Frequency and PRN reasons based on the score. If a patient is on this medication at home then do not decrease Frequency below that used at home.     0-3 - enter or revise RT bronchodilator order(s) to equivalent RT Bronchodilator order with Frequency of every 4 hours PRN for wheezing or increased work of breathing using Per Protocol order mode. 4-6 - enter or revise RT Bronchodilator order(s) to two equivalent RT bronchodilator orders with one order with BID Frequency and one order with Frequency of every 4 hours PRN wheezing or increased work of breathing using Per Protocol order mode. 7-10 - enter or revise RT Bronchodilator order(s) to two equivalent RT bronchodilator orders with one order with TID Frequency and one order with Frequency of every 4 hours PRN wheezing or increased work of breathing using Per Protocol order mode. 11-13 - enter or revise RT Bronchodilator order(s) to one equivalent RT bronchodilator order with QID Frequency and an Albuterol order with Frequency of every 4 hours PRN wheezing or increased work of breathing using Per Protocol order mode. Greater than 13 - enter or revise RT Bronchodilator order(s) to one equivalent RT bronchodilator order with every 4 hours Frequency and an Albuterol order with Frequency of every 2 hours PRN wheezing or increased work of breathing using Per Protocol order mode. RT to enter RT Home Evaluation for COPD & MDI Assessment order using Per Protocol order mode.     Electronically signed by Marcy Davis RCP on 7/12/2022 at 8:39 AM

## 2022-07-12 NOTE — PROGRESS NOTES
Comprehensive Nutrition Assessment    Type and Reason for Visit:  Reassess    Nutrition Recommendations/Plan:   1. Continue NPO status until patient is medically cleared to receive nutrition therapy. 2. TF recommendations - ADULT TUBE FEEDING; Orogastric; Peptide-Based High-Protein formula - Vital High-Protein with a goal rate of 35 ml/hr x 20 hours. Start with 20 ml/hr and increase by 15 ml every 4 hours, as tolerated by patient, until goal rate can be achieved and maintained. Water flushes, 30 ml every 3 hours for tube patency. Please administer one proteinex P2Go once daily via feeding tube. 3. Monitor TF start, rate, intake, and tolerance + water flushes + administration of one proteinex P2Go once daily via feeding tube. 4. Monitor vent status, sedation type/amount (propofol is currently infusing at 30 mcg x 24 hours which = 542 kcals from lipids), TG checks, and plan of care. 5. Monitor nutrition-related labs, bowel function, and weight trends.       Malnutrition Assessment:  Malnutrition Status:  Severe malnutrition (07/12/22 1141)    Context:  Acute Illness     Findings of the 6 clinical characteristics of malnutrition:  Energy Intake:  50% or less of estimated energy requirements for 5 or more days  Weight Loss:  Greater than 5% over 1 month (- 20# or 7.3% weight loss since 6/18/22)     Body Fat Loss:  No significant body fat loss     Muscle Mass Loss:  No significant muscle mass loss    Fluid Accumulation:  No significant fluid accumulation     Strength:  Not Performed    Nutrition Assessment:    patient is declining from a nutritional standpoint AEB inability to consume adequate po nutrition d/t respiratory dysfunction and intubation after rounds today and she remains at risk for further compromise d/t respiratory status decline/intubation, altered nutrition-related labs, and decreased nutrition intake during majority of this admission; will d/c diet order, make patient NPO, and provide TF recommendations    Nutrition Related Findings:    patient had bipap mask on this am during rounds and decision to intubate patient after rounds was made while rounding on patient; thrush is improved; she consumed < 50% x 2 meals on 7/11/22; last documented BM was on 7/11/22; she was transferred to ICU on 7/11/22; abdomen is rotund, soft, and bowel sounds are active; patient is A & O x 4 Wound Type: Stage II,Pressure Injury (stage 2 pressure injury on R buttocks)       Current Nutrition Intake & Therapies:    Average Meal Intake: 1-25%,26-50%  Average Supplements Intake: Unable to assess  Current Tube Feeding (TF) Orders:  · Feeding Route: Orogastric  · Formula: Peptide Based High Protein  · Schedule: Continuous  · Feeding Regimen: Vital High-Protein with a goal rate of 35 ml/hr x 20 hours  · Additives/Modulars: Protein (one proteinex P2Go once daily via feeding tube)  · Water Flushes: 30 ml water flushes every 3 hours for tube patency  · Current TF & Flush Orders Provides: TF recommendations - patient was intubated today after rounds  · Goal TF & Flush Orders Provides: Vital High-Protein with a goal rate of 35 ml/hr x 20 hours = 700 ml TV, 700 kcals, 61 g protein, and 585 ml free water + 26 g protein and 104 kcals from one proteinex P2Go once daily via feeding tube (87 g protein and 804 kcals total) + 30 ml water flushes every 3 hours for tube patency      Anthropometric Measures:  Height: 5' 6\" (167.6 cm)  Ideal Body Weight (IBW): 130 lbs (59 kg)    Admission Body Weight: 270 lb (122.5 kg) (obtained on 6/18/22; actual weight)  Current Body Weight: 250 lb 3.2 oz (113.5 kg) (obtained on 7/11/22; actual weight), 192.5 % IBW.  Weight Source: Bed Scale  Current BMI (kg/m2): 40.4  Usual Body Weight: 270 lb (122.5 kg) (obtained on 6/18/22; actual weight)  % Weight Change (Calculated): -7.3  Weight Adjustment For: No Adjustment                 BMI Categories: Obese Class 3 (BMI 40.0 or greater) (40.04)    Estimated Daily Nutrient Needs:  Energy Requirements Based On: Kcal/kg  Weight Used for Energy Requirements: Current  Energy (kcal/day): 912 - 1254 kcals based on 8-11 kcals/kg/CBW  Weight Used for Protein Requirements: Ideal  Protein (g/day): 148 - 153 g protein based on 2.5-2.6 g/kg/IBW  Method Used for Fluid Requirements: 1 ml/kcal  Fluid (ml/day): 912 - 1254 ml    Nutrition Diagnosis:   · Inadequate oral intake related to inadequate protein-energy intake,impaired respiratory function as evidenced by NPO or clear liquid status due to medical condition,intubation,intake 0-25%,intake 26-50%,lab values,weight loss      Nutrition Interventions:   Food and/or Nutrient Delivery: Continue NPO,Start Tube Feeding  Nutrition Education/Counseling: No recommendation at this time  Coordination of Nutrition Care: Continue to monitor while inpatient,Interdisciplinary Rounds  Plan of Care discussed with: ICU Team    Goals:  Previous Goal Met: Progress towards Goal(s) Declining  Goals: Initiate nutrition support,by next RD assessment       Nutrition Monitoring and Evaluation:   Behavioral-Environmental Outcomes: None Identified  Food/Nutrient Intake Outcomes: Enteral Nutrition Intake/Tolerance  Physical Signs/Symptoms Outcomes: Biochemical Data,Fluid Status or Edema,Hemodynamic Status,Nutrition Focused Physical Findings,Skin,Weight    Discharge Planning:     Too soon to determine     Temo Torres, [address] [place] [address],   Contact: [phone]

## 2022-07-12 NOTE — PROGRESS NOTES
Reassessment completed (see Flowsheet). All ICU lines remain intact, ICU monitoring continued-   Infusing:  Propofol/ KVO (see MAR),    pt remains On PEEP 12. BP recovered from previous 70s/30s to 120s/60s (see MAR)  . Lung sounds Diminished  [facility] as Pure Wick not obtaining good I/O- leaking even with Repositioning. Complete bed change and CHG bath completed. Continuing to monitor.

## 2022-07-12 NOTE — PROGRESS NOTES
Shift assessment was completed (see flow sheet). Pt is A/O, denies any pain or other needs at this time. Respirations are even, Shallow, with Rhonchi/Diminished sounds. On Bipap 16/8, 100%  Scheduled medications to follow- Holding PO medications. Infusing:  N/A  Call light within reach. Bed in lowest position. Bed alarm on. Will continue to monitor.     Spouse at bedside/

## 2022-07-12 NOTE — PROGRESS NOTES
Pt desaturation down at 77% on Airvo. Rt and primary RN in room. Pt placed back on Bipap. Family at bedside. Pt reconfirmed with Pt that she wants to be intubated if she cannot maintain her oxygen saturation. Pt stating \" I don't want to die yet\". Family at bedside. Family agreeable for intubation. Waiting on ICU bed/transfer. ICU team called and MD made aware of potential intubation.     Sean Carr RN

## 2022-07-12 NOTE — PROGRESS NOTES
Shift handoff report given to Belem Jacobson RN at bedside. Pt is sedated on eddie  IV handoff completed. 4 eyes completed. Call light within reach, bed in lowest position, bed alarm on. End of shift checks completed. Pt has been free of falls for duration of shift. Yamilex Major

## 2022-07-12 NOTE — PROGRESS NOTES
Pulmonary & Critical Care Medicine ICU Progress Note    CC: shortness of breath     Events of Last 24 hours:   Still on BiPAP overnight and still with respiratory distress   More upper airway secretions and very congested   Worsening hypoxia  White out left lung   Thick secretions   Weak cough     Vascular lines: IV: PICC 6/19/2022 P      Intake/Output Summary (Last 24 hours) at 7/12/2022 0923  Last data filed at 7/11/2022 1700  Gross per 24 hour   Intake 240 ml   Output 1300 ml   Net -1060 ml         MV: 6/18/2022 -6/27/22  Vent Mode: AC/VC Resp Rate (Set): 0 bmp/Vt (Set, mL): 0 mL/ /FiO2 : 100 %  Recent Labs     07/11/22 2055 07/12/22  0500   PHART 7.458* 7.508*   EUX6TKT 53.8* 50.5*   PO2ART 55.3* 55.6*       IV:   sodium chloride      sodium chloride 5 mL/hr at 07/10/22 0747       Vitals:  Blood pressure (!) 153/85, pulse (!) 110, temperature 98.6 °F (37 °C), temperature source Axillary, resp. rate 17, height 5' 6\" (1.676 m), weight 250 lb 3.2 oz (113.5 kg), SpO2 97 %, not currently breastfeeding. On  5 L  Constitutional:  No acute distress. HENT:  Oropharynx is clear and moist.   Neck: No tracheal deviation present. Cardiovascular: Normal heart sounds. No lower extremity edema. Pulmonary/Chest: no breath sound left side   Musculoskeletal: No cyanosis. No clubbing. Skin: Skin is warm and dry. Psychiatric: Normal mood and affect.   Neurologic: speech fluent, alert and oriented, strength symmetric          Scheduled Meds:   [Held by provider] furosemide  80 mg IntraVENous TID    medicated lip balm        sodium bicarbonate  650 mg Oral Daily    potassium bicarb-citric acid  20 mEq Oral Daily    acetylcysteine  600 mg Inhalation BID    metoprolol tartrate  50 mg Oral BID    ipratropium-albuterol  1 ampule Inhalation Q4H While awake    pantoprazole  40 mg IntraVENous BID    chlorhexidine  15 mL Mouth/Throat BID    sodium chloride flush  10 mL IntraVENous 2 times per day    [Held by provider] apixaban  2.5 mg Oral BID    atorvastatin  40 mg Oral Nightly    carbidopa-levodopa  1 tablet Oral 4x Daily    DULoxetine  60 mg Oral Daily    lamoTRIgine  50 mg Oral BID    montelukast  10 mg Oral Daily    propafenone  150 mg Oral 3 times per day       Data:  CBC:   Recent Labs     07/10/22  0435 07/11/22  0552 07/12/22  0354   WBC 10.4 10.0 11.2*   HGB 10.0* 10.0* 9.9*   HCT 31.5* 30.5* 31.1*   MCV 85.9 86.0 87.1    321 311     BMP:   Recent Labs     07/10/22  0435 07/11/22  0552 07/12/22  0354    143 140   K 4.1 4.3 3.7    97* 95*   CO2 31 33* 34*   PHOS 3.8 4.3 3.8   BUN 25* 25* 25*   CREATININE 1.5* 1.7* 1.8*     LIVER PROFILE:   No results for input(s): AST, ALT, LIPASE, BILIDIR, BILITOT, ALKPHOS in the last 72 hours. Invalid input(s): AMYLASE,  ALB    Microbiology:  6/17/2022 SARS-CoV-2 and influenza are negative  6/17/2022 MRSA DNA screen positive  6/17/2022 blood NG  6/17/2022 sputum NRF  6/18/2022 tracheal aspirate NRF  6/19/2022 BAL Candida  6/19/2022 respiratory viral panel negative  7/1/22 Blood cx NGTD  7/7/2022 bronchial washings and BAL Candida krusei      Imaging:  CT chest 7/3/22  images reviewed by me and showed:   1. Multifocal bronchopneumonia, with progression since 06/17/2022.   2. Small bilateral pleural effusions  3. Narrowing of the AP diameter of the trachea  4.  Mild cardiomegaly    ASSESSMENT:  · Acute hypoxemic respiratory failure, initially on vent, extubated but then returned to ICU and required BiPAP  · Aspiration pneumonia - recurrent  · Mucous plugging of bronchi on bronchoscopy 7/7/2022  · Tracheobronchomalacia on bronchoscopy 7/7/2022 -  perhaps limiting airway clearance  · BETSY on BiPAP 22/15 with 1 L bleed in   · Parkinson's disease  · Atrial fibrillation  · Acute on chronic kidney failure, baseline creatinine 1.6  · Acute on chronic anemia, s/p 2 U PRBC 6/23/2022  · H/O esophagitis  · Pleural effusions are small bilaterally    PLAN:  CXR still showing white out lung  white out lung  Long discussion with patient and  they agree on intubation and then followed by Bronch ,if not able to extubate next 1-2 days to make her comfortable   S/p bronch ,large mucus plug was removed   Bilevel non-invasive positive pressure ventilation HS  Mucomyst 3 ML 20% inhalation BID  Metaneb  NT suctioning as needed   Chest vest   All abx managed by ID ,stopped   Fluconazole stopped   ID following  Nephrology following, hold diuresis  On Eliquis will hold    discussed with        Care reviewed with nursing staff, medical and surgical specialty care, primary care and the patient's family as available. Chart review/lab review/X-ray viewing/documentation and had long Conversation with patient/family re: prognosis, care options and any end of life issues:      Critical care time spent reviewing labs/films, examining patient, collaborating with other physicians more than 35  Minutes  excluding procedures . Elizabeth Ribeiro M.D.   7/12/2022  9:23 AM

## 2022-07-12 NOTE — PROGRESS NOTES
4 Eyes Skin Assessment     The patient is being assess for   Shift    I agree that 2 RN's have performed a thorough Head to Toe Skin Assessment on the patient. ALL assessment sites listed below have been assessed. Areas assessed for pressure by both nurses:   [x]   Head, Face, and Ears   [x]   Shoulders, Back, and Chest, Abdomen  [x]   Arms, Elbows, and Hands   [x]   Coccyx, Sacrum, and Ischium  [x]   Legs, Feet, and Heels      Scattered bruising. Excoriation to korey area and buttocks. Skin Assessed Under all Medical Devices by both nurses:  ETT and OG              All Mepilex Borders were peeled back and area peeked at by both nurses:  No: removed d/t excoriation  Please list where Mepilex Borders are located:               **SHARE this note so that the co-signing nurse is able to place an eSignature**    Co-signer eSignature: Electronically signed by Steve Perez RN on 7/12/22 at 3:46 PM EDT    Does the Patient have Skin Breakdown related to pressure?   No     (Insert Photo here)         Vineet Prevention initiated:  Yes   Wound Care Orders initiated:  Yes      [id number]  nurse consulted for Pressure Injury (Stage 3,4, Unstageable, DTI, NWPT, Complex wounds)and New or Established Ostomies:  No      Primary Nurse eSignature: Electronically signed by Roby Hoover RN on 7/12/22 at 3:22 PM EDT

## 2022-07-12 NOTE — PROGRESS NOTES
07/12/22 0314   NIV Type   Skin Protection for O2 Device Yes   Location Nose   Suction Setup and Functional Yes   NIV Started/Stopped On   Equipment Type v60   Mode Bilevel   Mask Type Full face mask   Mask Size Medium   Settings/Measurements   IPAP 16 cmH20   CPAP/EPAP 8 cmH2O   Rate Ordered 16   Resp 28   O2 Flow Rate (L/min) 60 L/min   FiO2  100 %   I Time/ I Time % 0.8 s   Vt Exhaled 483 mL   Minute Volume 13.5 Liters   Mask Leak (lpm) 3 lpm   Comfort Level Good   Using Accessory Muscles No   SpO2 95   Oxygen Therapy/Pulse Ox   $Oxygen $Daily Charge   Heart Rate 92   SpO2 93 %   $Pulse Oximeter $Spot check (multiple/continuous)

## 2022-07-12 NOTE — PROGRESS NOTES
2020       Ru Garcia MD  [address] [address]  Via In Basket      Patient: Vasiliy Sanchez   YOB: 1963   Date of Visit: 10/26/2020       Dear Dr. Garcia:    Thank you for referring Vasiliy Sanchez to me for evaluation. Below are my notes for this visit with her.    If you have questions, please do not hesitate to call me. I look forward to following your patient along with you.      Sincerely,        Mariely Joaquin MD        CC: No Recipients  Mariely Joaquin MD  10/26/2020  2:24 PM  Signed  RHEUMATOLOGY NEW PATIENT VISIT    10/26/2020  Patient Name: Vasiliy Sanchez  : 1963  Medical Record: [id number]      CHIEF COMPLAINT:  Chief Complaint   Patient presents with   • New Patient     consultation for OP and Arthritis.          HISTORY OF PRESENT ILLNESS  Vasiliy Sanchez is a 57 year old female who is being seen for evaluation of joint pain    Patient is a 57-year-old female who was initially evaluated by Dr. Hyatt, rheumatologist and was diagnosed with possible overlap of rheumatoid arthritis, lupus and Sjogren's syndrome.  She started seeing him in .  She was started on Plaquenil and took the medication \"couple of years\".  She does not remember the exact dose and duration of treatment.  After residential of Dr. Hyatt, patient started following up with a different rheumatologist in the same practice Dr. Perea.  Patient was switched over to Arava.  Patient however does not remember the dose and the duration of treatment.  She started seeing Dr. Hernandez in  of this year.  With Dr. Hernandez's office, she completed blood work for autoimmune disease and connective tissue disease and also completed her Prolia shot.    Since then she has stopped following up with Dr. Hernandez's office.  Currently she is not taking any oral medications.  She reports pain in her low back which sometimes radiates into her legs along with pain in her hands and  Call to RT- writer unable to get SpO2 to be greater that 87% and stay there. Nothing productive in suctioning. Vent Setting 100% FiO2, 12 PEEP. feet.  She reports that her a.m. stiffness is variable as well as her pain scale.  She denies aggravating or relieving factors.    She reports that she was started on treatment for osteoporosis because possibly of premature menopause.  Currently she has been on Prolia weekly since 2016.  She completed a recent bone density in 2020 September with her primary care's office.        FH: family h/o AI arthritis in cousin    SH: denies smoking, occasional alcohol, denies illicit use    Occupation:       Past Medical History:   Diagnosis Date   • Localized superficial swelling, mass, or lump, right back 2/13/2019     Past Surgical History:   Procedure Laterality Date   • Breast mass excision     • Colonoscopy  2015     Social History     Socioeconomic History   • Marital status: /Civil Union     Spouse name: Not on file   • Number of children: Not on file   • Years of education: Not on file   • Highest education level: Not on file   Occupational History   • Not on file   Social Needs   • Financial resource strain: Not on file   • Food insecurity     Worry: Not on file     Inability: Not on file   • Transportation needs     Medical: Not on file     Non-medical: Not on file   Tobacco Use   • Smoking status: Former Smoker   • Smokeless tobacco: Former User   Substance and Sexual Activity   • Alcohol use: Yes     Comment: social   • Drug use: Never   • Sexual activity: Yes     Partners: Male     Birth control/protection: None   Lifestyle   • Physical activity     Days per week: Not on file     Minutes per session: Not on file   • Stress: Not on file   Relationships   • Social connections     Talks on phone: Not on file     Gets together: Not on file     Attends Rastafarian service: Not on file     Active member of club or organization: Not on file     Attends meetings of clubs or organizations: Not on file     Relationship status: Not on file   • Intimate partner violence     Fear of current or ex partner: No      Emotionally abused: No     Physically abused: No     Forced sexual activity: No   Other Topics Concern   • Not on file   Social History Narrative   • Not on file     Family History   Problem Relation Age of Onset   • Cancer, Breast Mother    • Hypertension Father    • Diabetes Maternal Grandmother    • Cancer, Lung Other            MEDICATIONS  Current Outpatient Medications   Medication Sig Dispense Refill   • Cholecalciferol (Vitamin D3) Liquid      • PREMARIN vaginal cream INSERT 1 GRAM VAGINALLY AT BEDTIME ONE TO TWO TIMES A WEEK OR LESS 30 g 3   • Magnesium 100 MG Tab      • cyclobenzaprine (FLEXERIL) 5 MG tablet Take 1 tablet by mouth 2 times daily as needed for Muscle spasms. 30 tablet 0   • MELOXICAM PO      • Multiple Vitamins-Minerals (WOMENS 50+ ADVANCED) Cap      • Green Tea 150 MG Cap      • Saccharomyces boulardii (PROBIOTIC) 250 MG Cap      • Omega-3 Fatty Acids (FISH OIL) 1000 MG CAPSULE DELAYED RELEASE      • naproxen sodium (ALEVE) 220 MG tablet Take 220 mg by mouth 2 times daily (with meals).       No current facility-administered medications for this visit.        ALLERGIES  ALLERGIES:  No Known Allergies        Review of Systems   Constitutional: Negative for chills, fatigue, fever and unexpected weight change.   HENT: Negative for congestion, mouth sores and sore throat.    Eyes: Negative for pain, redness and visual disturbance.   Respiratory: Negative for cough and shortness of breath.    Cardiovascular: Negative for chest pain and leg swelling.   Gastrointestinal: Negative for abdominal pain, blood in stool, nausea and vomiting.   Endocrine: Negative for polydipsia and polyuria.   Genitourinary: Negative for dysuria and genital sores.   Musculoskeletal: Positive for arthralgias, back pain and joint swelling.        See HPI   Skin: Negative for rash and wound.   Neurological: Negative for weakness and numbness.   Hematological: Negative for adenopathy.   Psychiatric/Behavioral: Negative  for agitation. The patient is not nervous/anxious.           PHYSICAL EXAM   Vitals:    10/26/20 1321   BP: 111/69   Pulse: (!) 56   Temp: 98 °F (36.7 °C)   TempSrc: Temporal   Weight: 54.9 kg (121 lb)   Height: 5' 3.78\" (1.62 m)         General appearance: Pt appears well developed, well nourished with attention to grooming  Skin: No rash. No induration  Eyes: Conjunctiva and lids moist and without inflammation, no icterus noted  ENT: no ulcers, no erythema  Respiratory: Clear to auscultation, no wheezes or rhonchi, normal respiratory effort   Cardiovascular: Regular rate and rhythm, no murmurs rubs or gallops  Abdomen: non tender, no masses, liver and spleen not palpable  Musculoskeletal:        Gait: Ambulates without assistance, normal gait  Fingers - +bony hypertrophy of PIP and DIP joints, +tenderness. Full finger flexion.  Wrists - no swelling, good range of motion, no tenderness  Elbows - right full flexion/extension, no tenderness, left full flexion/extension, not tender  Shoulder - full range of motion, no effusion  Cervical spine - good range of motion, no pain with rotation  Thoracolumbar spine - no focal tenderness  Hips - full range of motion, greater trochanters not tender  Knee - cool to touch , no effusion, no bony hypertrophy  Ankle - full range of motion, no swelling  Toes - no tenderness or deformities  Neuro: A&Ox4, strength is 5/5 in bilateral upper and lower extremity. no gross motor or sensory defects  Psychiatric: Mood and affect are appropriate        LABS:     Reviewed blood work from Dr. Hernandez's office from May 18, 2020.  Discussed the results of this testing with the patient.      IMAGING:    Feb 11, 2020  2:34 PM   Associated Diagnoses    Pain in joint, shoulder region       IR Timeline    IR Event Log   PACS Images     Show images for XR HAND MIN 3 VIEWS RIGHT   Result Information    Date and Time: Exam End: 2/11/2020 14:34 Status: Final result Provider Status: Open   Priority:  Routine          Study Result    EXAM: XR HAND MIN 3 VIEWS RIGHT     CLINICAL INDICATION: Pain.     COMPARISON: None.     FINDINGS: No evidence of fracture or dislocation.  Joint space narrowing which is moderate at the 4th and 5th PIP joints and the 4th DIP joint.  Severe arthritic change with joint space narrowing and periarticular erosions at the 5th DIP joint with soft   tissue swelling.     IMPRESSION:      As above.          Assessment and plan  #Osteoporosis  #Long-term Prolia use  #Joint pain and swelling-differentials include inflammatory versus degenerative arthritis    Based on today's evaluation, it appears that patient's recent DEXA scan with her primary care provider on September 8, 2020 still reflects a T score of -2.5 which is osteoporotic on the lumbar spine region.  I discussed with the patient that abrupt interruption of Prolia treatment can lead to rebound increasing fracture risk.  Patient verbalized understanding.  At this point she would like to continue with Prolia treatment for now and repeat a bone density in 2 years time.  Patient remembers that her last Prolia injection was in June.  We will have my staff contact patient's previous rheumatologist office to find the exact date and schedule her about 6 months from the last injection for Prolia shot.  Advised her to continue calcium and vitamin D supplementation along with weightbearing exercises.    In regards to her joint pains, patient has a previous diagnosis of rheumatoid arthritis along with overlap of lupus and Sjogren's.  However her current blood work with the previous rheumatologist reflex negative rheumatoid factor, anti-CCP antibody.  She is found to have a borderline positive GINNY of 1: 80 with negative subserologies.  I think there is a possibility that she had inflammatory arthritis in the past and that has gone into remission now.  There is a possibility that now we are seeing the damage that she has had from previous  inflammatory arthritis.  However I do not have enough evidence as of yet.  I have advised the patient to complete x-rays of her hands, feet, low back and sacroiliac joints.  I also advised the patient that if the x-rays are equivocal then it might be beneficial to proceed with an MRI of the hand to help us understand the amount of inflammation.    I have also advised the patient to start using Flexeril 5 mg at nighttime to help with the muscle stiffness in her lumbar region.  Discussed the risk and benefits of Flexeril medication with the patient in detail.    All patient's questions were answered to her satisfaction.    RTC -2 to 4 weeks, depending on the results of the above testing    The patient indicates understanding of these issues and agrees with the plan.  All of patients questions were answered    Mariely Joaquin MD, [facility]    This note was created using the Dragon voice recognition system. Errors in content may be related to improper recognition of the system. Effort to review and correct the note has been made but irregularities may still be present.

## 2022-07-12 NOTE — PROGRESS NOTES
07/11/22 1458   NIV Type   NIV Started/Stopped On   Equipment Type v60   Mode Bilevel   Mask Type Full face mask   Mask Size Medium   Settings/Measurements   IPAP 16 cmH20   CPAP/EPAP 8 cmH2O   Rate Ordered 16   Resp 23   FiO2  90 %   I Time/ I Time % 0.8 s   Vt Exhaled 900 mL   Minute Volume 20.8 Liters   Mask Leak (lpm) 35 lpm   Comfort Level Good   Using Accessory Muscles No   SpO2 92   Oxygen Therapy/Pulse Ox   O2 Therapy Oxygen humidified   O2 Device PAP (positive airway pressure)   Heart Rate 86   SpO2 93 %

## 2022-07-12 NOTE — PROCEDURES
Bronchus (including the Superior segment)   was obstructed with large mucus plug           BAL : Left lower lobe     COMPLICATIONS:  Estimated blood loss less than 1 CC          RECOMMENDATIONS:   The Patient was in good condition ,monitor vitals   Await final test/sample results.         Kathie Pedroza MD

## 2022-07-12 NOTE — PROGRESS NOTES
07/11/22 2000   NIV Type   Suction Setup and Functional Yes   NIV Started/Stopped On   Equipment Type v60   Mode Bilevel   Mask Type Full face mask   Mask Size Medium   Settings/Measurements   IPAP 16 cmH20   CPAP/EPAP 8 cmH2O   Rate Ordered 18   Resp 30   FiO2  100 %  (decreased to 90%)   I Time/ I Time % 0.8 s   Vt Exhaled 436 mL   Minute Volume 12.2 Liters   Mask Leak (lpm) 0 lpm   Comfort Level Good   Using Accessory Muscles No   SpO2 99   Alarm Settings   Alarms On Y   Low Pressure 8 cmH2O   High Pressure  35 cmH2O   Delay Alarm 20 sec(s)   RR Low  14   RR High 55 br/min   Oxygen Therapy/Pulse Ox   Heart Rate 92   SpO2 97 %

## 2022-07-13 ENCOUNTER — APPOINTMENT (OUTPATIENT)
Dept: GENERAL RADIOLOGY | Age: 82
DRG: 004 | End: 2022-07-13
Payer: MEDICARE

## 2022-07-13 LAB
ANION GAP SERPL CALCULATED.3IONS-SCNC: 15 MMOL/L (ref 3–16)
BASE EXCESS ARTERIAL: 10.2 MMOL/L (ref -3–3)
BASOPHILS ABSOLUTE: 0.1 K/UL (ref 0–0.2)
BASOPHILS RELATIVE PERCENT: 0.5 %
BUN BLDV-MCNC: 31 MG/DL (ref 7–20)
CALCIUM SERPL-MCNC: 10 MG/DL (ref 8.3–10.6)
CARBOXYHEMOGLOBIN ARTERIAL: 0.3 % (ref 0–1.5)
CHLORIDE BLD-SCNC: 92 MMOL/L (ref 99–110)
CO2: 31 MMOL/L (ref 21–32)
CREAT SERPL-MCNC: 2.6 MG/DL (ref 0.6–1.2)
EOSINOPHILS ABSOLUTE: 0.6 K/UL (ref 0–0.6)
EOSINOPHILS RELATIVE PERCENT: 4.7 %
GFR AFRICAN AMERICAN: 21
GFR NON-AFRICAN AMERICAN: 18
GLUCOSE BLD-MCNC: 100 MG/DL (ref 70–99)
GLUCOSE BLD-MCNC: 102 MG/DL (ref 70–99)
GLUCOSE BLD-MCNC: 107 MG/DL (ref 70–99)
GLUCOSE BLD-MCNC: 107 MG/DL (ref 70–99)
GLUCOSE BLD-MCNC: 114 MG/DL (ref 70–99)
GLUCOSE BLD-MCNC: 125 MG/DL (ref 70–99)
GLUCOSE BLD-MCNC: 91 MG/DL (ref 70–99)
HCO3 ARTERIAL: 32.3 MMOL/L (ref 21–29)
HCT VFR BLD CALC: 29 % (ref 36–48)
HEMOGLOBIN, ART, EXTENDED: 10.8 G/DL (ref 12–16)
HEMOGLOBIN: 9.4 G/DL (ref 12–16)
LYMPHOCYTES ABSOLUTE: 1.8 K/UL (ref 1–5.1)
LYMPHOCYTES RELATIVE PERCENT: 14.1 %
MAGNESIUM: 2.3 MG/DL (ref 1.8–2.4)
MCH RBC QN AUTO: 27.6 PG (ref 26–34)
MCHC RBC AUTO-ENTMCNC: 32.5 G/DL (ref 31–36)
MCV RBC AUTO: 84.9 FL (ref 80–100)
METHEMOGLOBIN ARTERIAL: 0.3 %
MONOCYTES ABSOLUTE: 0.9 K/UL (ref 0–1.3)
MONOCYTES RELATIVE PERCENT: 7 %
NEUTROPHILS ABSOLUTE: 9.6 K/UL (ref 1.7–7.7)
NEUTROPHILS RELATIVE PERCENT: 73.7 %
O2 SAT, ARTERIAL: 96 %
O2 THERAPY: ABNORMAL
PCO2 ARTERIAL: 33.9 MMHG (ref 35–45)
PDW BLD-RTO: 13.8 % (ref 12.4–15.4)
PERFORMED ON: ABNORMAL
PERFORMED ON: NORMAL
PH ARTERIAL: 7.6 (ref 7.35–7.45)
PHOSPHORUS: 3.8 MG/DL (ref 2.5–4.9)
PLATELET # BLD: 339 K/UL (ref 135–450)
PMV BLD AUTO: 8.7 FL (ref 5–10.5)
PO2 ARTERIAL: 67.2 MMHG (ref 75–108)
POTASSIUM SERPL-SCNC: 4 MMOL/L (ref 3.5–5.1)
RBC # BLD: 3.42 M/UL (ref 4–5.2)
SODIUM BLD-SCNC: 138 MMOL/L (ref 136–145)
TCO2 ARTERIAL: 33.4 MMOL/L
WBC # BLD: 13 K/UL (ref 4–11)

## 2022-07-13 PROCEDURE — 85025 COMPLETE CBC W/AUTO DIFF WBC: CPT

## 2022-07-13 PROCEDURE — 31622 DX BRONCHOSCOPE/WASH: CPT | Performed by: INTERNAL MEDICINE

## 2022-07-13 PROCEDURE — 2500000003 HC RX 250 WO HCPCS: Performed by: INTERNAL MEDICINE

## 2022-07-13 PROCEDURE — 2580000003 HC RX 258: Performed by: INTERNAL MEDICINE

## 2022-07-13 PROCEDURE — 6360000002 HC RX W HCPCS: Performed by: INTERNAL MEDICINE

## 2022-07-13 PROCEDURE — 84100 ASSAY OF PHOSPHORUS: CPT

## 2022-07-13 PROCEDURE — 80048 BASIC METABOLIC PNL TOTAL CA: CPT

## 2022-07-13 PROCEDURE — 2700000000 HC OXYGEN THERAPY PER DAY

## 2022-07-13 PROCEDURE — 0BCB8ZZ EXTIRPATION OF MATTER FROM LEFT LOWER LOBE BRONCHUS, VIA NATURAL OR ARTIFICIAL OPENING ENDOSCOPIC: ICD-10-PCS | Performed by: INTERNAL MEDICINE

## 2022-07-13 PROCEDURE — 82803 BLOOD GASES ANY COMBINATION: CPT

## 2022-07-13 PROCEDURE — 6370000000 HC RX 637 (ALT 250 FOR IP): Performed by: INTERNAL MEDICINE

## 2022-07-13 PROCEDURE — P9045 ALBUMIN (HUMAN), 5%, 250 ML: HCPCS | Performed by: INTERNAL MEDICINE

## 2022-07-13 PROCEDURE — P9047 ALBUMIN (HUMAN), 25%, 50ML: HCPCS | Performed by: INTERNAL MEDICINE

## 2022-07-13 PROCEDURE — 94669 MECHANICAL CHEST WALL OSCILL: CPT

## 2022-07-13 PROCEDURE — 99291 CRITICAL CARE FIRST HOUR: CPT | Performed by: INTERNAL MEDICINE

## 2022-07-13 PROCEDURE — 94640 AIRWAY INHALATION TREATMENT: CPT

## 2022-07-13 PROCEDURE — 0BC98ZZ EXTIRPATION OF MATTER FROM LINGULA BRONCHUS, VIA NATURAL OR ARTIFICIAL OPENING ENDOSCOPIC: ICD-10-PCS | Performed by: INTERNAL MEDICINE

## 2022-07-13 PROCEDURE — C9113 INJ PANTOPRAZOLE SODIUM, VIA: HCPCS | Performed by: INTERNAL MEDICINE

## 2022-07-13 PROCEDURE — 99233 SBSQ HOSP IP/OBS HIGH 50: CPT | Performed by: INTERNAL MEDICINE

## 2022-07-13 PROCEDURE — 31622 DX BRONCHOSCOPE/WASH: CPT

## 2022-07-13 PROCEDURE — 0BC78ZZ EXTIRPATION OF MATTER FROM LEFT MAIN BRONCHUS, VIA NATURAL OR ARTIFICIAL OPENING ENDOSCOPIC: ICD-10-PCS | Performed by: INTERNAL MEDICINE

## 2022-07-13 PROCEDURE — 94003 VENT MGMT INPAT SUBQ DAY: CPT

## 2022-07-13 PROCEDURE — 6370000000 HC RX 637 (ALT 250 FOR IP)

## 2022-07-13 PROCEDURE — 2000000000 HC ICU R&B

## 2022-07-13 PROCEDURE — 83735 ASSAY OF MAGNESIUM: CPT

## 2022-07-13 PROCEDURE — 94761 N-INVAS EAR/PLS OXIMETRY MLT: CPT

## 2022-07-13 PROCEDURE — 71045 X-RAY EXAM CHEST 1 VIEW: CPT

## 2022-07-13 PROCEDURE — 0BC88ZZ EXTIRPATION OF MATTER FROM LEFT UPPER LOBE BRONCHUS, VIA NATURAL OR ARTIFICIAL OPENING ENDOSCOPIC: ICD-10-PCS | Performed by: INTERNAL MEDICINE

## 2022-07-13 RX ORDER — ALBUMIN, HUMAN INJ 5% 5 %
25 SOLUTION INTRAVENOUS EVERY 6 HOURS
Status: COMPLETED | OUTPATIENT
Start: 2022-07-13 | End: 2022-07-14

## 2022-07-13 RX ORDER — DEXMEDETOMIDINE HYDROCHLORIDE 4 UG/ML
.1-1.5 INJECTION, SOLUTION INTRAVENOUS CONTINUOUS
Status: DISCONTINUED | OUTPATIENT
Start: 2022-07-13 | End: 2022-07-18

## 2022-07-13 RX ORDER — LIDOCAINE HYDROCHLORIDE 40 MG/ML
SOLUTION TOPICAL
Status: COMPLETED
Start: 2022-07-13 | End: 2022-07-13

## 2022-07-13 RX ORDER — HEPARIN SODIUM 5000 [USP'U]/ML
5000 INJECTION, SOLUTION INTRAVENOUS; SUBCUTANEOUS EVERY 8 HOURS SCHEDULED
Status: DISCONTINUED | OUTPATIENT
Start: 2022-07-13 | End: 2022-07-19 | Stop reason: HOSPADM

## 2022-07-13 RX ORDER — ALBUMIN (HUMAN) 12.5 G/50ML
25 SOLUTION INTRAVENOUS ONCE
Status: COMPLETED | OUTPATIENT
Start: 2022-07-13 | End: 2022-07-13

## 2022-07-13 RX ORDER — FENTANYL CITRATE-0.9 % NACL/PF 10 MCG/ML
25-200 PLASTIC BAG, INJECTION (ML) INTRAVENOUS CONTINUOUS
Status: DISCONTINUED | OUTPATIENT
Start: 2022-07-13 | End: 2022-07-18

## 2022-07-13 RX ADMIN — Medication 25 MCG/HR: at 10:52

## 2022-07-13 RX ADMIN — LIDOCAINE HYDROCHLORIDE: 40 SOLUTION TOPICAL at 10:57

## 2022-07-13 RX ADMIN — Medication 0.2 MCG/KG/HR: at 11:34

## 2022-07-13 RX ADMIN — IPRATROPIUM BROMIDE AND ALBUTEROL SULFATE 1 AMPULE: 2.5; .5 SOLUTION RESPIRATORY (INHALATION) at 15:48

## 2022-07-13 RX ADMIN — CARBIDOPA AND LEVODOPA 1 TABLET: 25; 100 TABLET ORAL at 17:03

## 2022-07-13 RX ADMIN — ALBUMIN (HUMAN) 25 G: 12.5 INJECTION, SOLUTION INTRAVENOUS at 21:10

## 2022-07-13 RX ADMIN — HEPARIN SODIUM 5000 UNITS: 5000 INJECTION INTRAVENOUS; SUBCUTANEOUS at 21:03

## 2022-07-13 RX ADMIN — PANTOPRAZOLE SODIUM 40 MG: 40 INJECTION, POWDER, LYOPHILIZED, FOR SOLUTION INTRAVENOUS at 08:13

## 2022-07-13 RX ADMIN — HEPARIN SODIUM 5000 UNITS: 5000 INJECTION INTRAVENOUS; SUBCUTANEOUS at 14:45

## 2022-07-13 RX ADMIN — CARBIDOPA AND LEVODOPA 1 TABLET: 25; 100 TABLET ORAL at 21:02

## 2022-07-13 RX ADMIN — MIDAZOLAM HYDROCHLORIDE 2 MG: 2 INJECTION, SOLUTION INTRAMUSCULAR; INTRAVENOUS at 04:32

## 2022-07-13 RX ADMIN — IPRATROPIUM BROMIDE AND ALBUTEROL SULFATE 1 AMPULE: 2.5; .5 SOLUTION RESPIRATORY (INHALATION) at 20:06

## 2022-07-13 RX ADMIN — PROPAFENONE HYDROCHLORIDE 150 MG: 150 TABLET, FILM COATED ORAL at 05:05

## 2022-07-13 RX ADMIN — ALBUTEROL SULFATE 2.5 MG: 2.5 SOLUTION RESPIRATORY (INHALATION) at 08:26

## 2022-07-13 RX ADMIN — MONTELUKAST SODIUM 10 MG: 10 TABLET, COATED ORAL at 08:12

## 2022-07-13 RX ADMIN — DULOXETINE HYDROCHLORIDE 60 MG: 60 CAPSULE, DELAYED RELEASE ORAL at 08:13

## 2022-07-13 RX ADMIN — PROPAFENONE HYDROCHLORIDE 150 MG: 150 TABLET, FILM COATED ORAL at 21:21

## 2022-07-13 RX ADMIN — ACETYLCYSTEINE 600 MG: 200 INHALANT RESPIRATORY (INHALATION) at 20:06

## 2022-07-13 RX ADMIN — ACETYLCYSTEINE 600 MG: 200 INHALANT RESPIRATORY (INHALATION) at 08:26

## 2022-07-13 RX ADMIN — Medication 15 ML: at 08:13

## 2022-07-13 RX ADMIN — PANTOPRAZOLE SODIUM 40 MG: 40 INJECTION, POWDER, LYOPHILIZED, FOR SOLUTION INTRAVENOUS at 21:02

## 2022-07-13 RX ADMIN — IPRATROPIUM BROMIDE AND ALBUTEROL SULFATE 1 AMPULE: 2.5; .5 SOLUTION RESPIRATORY (INHALATION) at 08:26

## 2022-07-13 RX ADMIN — Medication 15 ML: at 21:02

## 2022-07-13 RX ADMIN — LAMOTRIGINE 50 MG: 25 TABLET ORAL at 08:13

## 2022-07-13 RX ADMIN — PROPAFENONE HYDROCHLORIDE 150 MG: 150 TABLET, FILM COATED ORAL at 14:44

## 2022-07-13 RX ADMIN — ALBUMIN (HUMAN) 25 G: 12.5 INJECTION, SOLUTION INTRAVENOUS at 16:26

## 2022-07-13 RX ADMIN — CARBIDOPA AND LEVODOPA 1 TABLET: 25; 100 TABLET ORAL at 08:13

## 2022-07-13 RX ADMIN — Medication 75 MCG/HR: at 22:18

## 2022-07-13 RX ADMIN — SODIUM CHLORIDE, PRESERVATIVE FREE 10 ML: 5 INJECTION INTRAVENOUS at 08:13

## 2022-07-13 RX ADMIN — IPRATROPIUM BROMIDE AND ALBUTEROL SULFATE 1 AMPULE: 2.5; .5 SOLUTION RESPIRATORY (INHALATION) at 22:59

## 2022-07-13 RX ADMIN — SODIUM BICARBONATE 650 MG: 650 TABLET ORAL at 08:13

## 2022-07-13 RX ADMIN — Medication 0.5 MCG/KG/HR: at 19:33

## 2022-07-13 RX ADMIN — ATORVASTATIN CALCIUM 40 MG: 40 TABLET, FILM COATED ORAL at 21:02

## 2022-07-13 RX ADMIN — PROPOFOL 40 MCG/KG/MIN: 10 INJECTION, EMULSION INTRAVENOUS at 08:30

## 2022-07-13 RX ADMIN — ALBUMIN (HUMAN) 25 G: 0.25 INJECTION, SOLUTION INTRAVENOUS at 10:35

## 2022-07-13 RX ADMIN — SODIUM CHLORIDE, PRESERVATIVE FREE 10 ML: 5 INJECTION INTRAVENOUS at 21:02

## 2022-07-13 RX ADMIN — CARBIDOPA AND LEVODOPA 1 TABLET: 25; 100 TABLET ORAL at 13:53

## 2022-07-13 RX ADMIN — LAMOTRIGINE 50 MG: 25 TABLET ORAL at 21:03

## 2022-07-13 RX ADMIN — MIDAZOLAM HYDROCHLORIDE 2 MG: 2 INJECTION, SOLUTION INTRAMUSCULAR; INTRAVENOUS at 15:25

## 2022-07-13 RX ADMIN — PROPOFOL 40 MCG/KG/MIN: 10 INJECTION, EMULSION INTRAVENOUS at 04:46

## 2022-07-13 RX ADMIN — IPRATROPIUM BROMIDE AND ALBUTEROL SULFATE 1 AMPULE: 2.5; .5 SOLUTION RESPIRATORY (INHALATION) at 10:18

## 2022-07-13 RX ADMIN — PROPOFOL 35 MCG/KG/MIN: 10 INJECTION, EMULSION INTRAVENOUS at 00:13

## 2022-07-13 ASSESSMENT — PULMONARY FUNCTION TESTS
PIF_VALUE: 35
PIF_VALUE: 35
PIF_VALUE: 34
PIF_VALUE: 35

## 2022-07-13 ASSESSMENT — PAIN SCALES - GENERAL
PAINLEVEL_OUTOF10: 0

## 2022-07-13 NOTE — PROGRESS NOTES
07/13/22 0310   Patient Observation   Heart Rate 96   Resp 20   SpO2 93 %   Breath Sounds   Right Upper Lobe Diminished   Right Middle Lobe Diminished   Right Lower Lobe Diminished   Left Upper Lobe Diminished   Left Lower Lobe Diminished   Airway Clearance   Suction Deep;ET Tube   Subglottic Suction Done Yes   Suction Device Harshalkauer; Inline suction catheter   Sputum Method Obtained Endotracheal   Sputum Amount Scant   Vent Settings   FiO2  90 %   Resp Rate (Set) 20 bmp   Vt (Set, mL) 440 mL   PEEP/CPAP (cmH2O) 12   Insp Flow (L/sec) 55 L/sec   I:E Ratio 1:2.4   Trigger Sensitivity Flow (L/min) 3 L/min   Heater Temperature 98.2 °F (36.8 °C)   Vent Patient Data (Readings)   Vt Exhaled 432 mL   Rate Measured 20 br/min   Minute Ventilation (l/min) 8.6 l/min   Minute Volume 8.61 Liters   I:E Ratio 1:2.4   Sensitivity 3   Peak Inspiratory Pressure 35 cmH2O   Mean Airway Pressure 19 cmH20   Plateau Pressure (cm H2O) 32 cm H2O   Static Compliance (L/cm H2O) 19   Vent Alarm Settings   High Pressure  40 cmH2O   Low Minute Volume Alarm 4 L/min   Ve Max 20   Ve Min 4   Low Exhaled Vt  4 mL   Vt Low  210 mL   Vt High  750 mL   RR High 40 br/min   Apnea (Secs) 20 secs   Additional Respiratory Assessments   Humidification Source Heated wire   Humidification Temp 36.2   Circuit Condensation Drained   ETT (adult)   Placement Date/Time: 07/12/22 4062   Placed By: Licensed provider  Placement Verified By: Chest X-ray;Colorimetric ETCO2 device;Direct visualization  Preoxygenation: Yes  Mask Ventilation: Ventilated by mask (1)  Technique: Rapid sequence;Direct laryn. ..    Secured At 22 cm   Measured From Lips   ETT Placement Center   Secured By Commercial tube hughes   Site Assessment Dry

## 2022-07-13 NOTE — PROGRESS NOTES
RT Inhaler-Nebulizer Bronchodilator Protocol Note    There is a bronchodilator order in the chart from a provider indicating to follow the RT Bronchodilator Protocol and there is an Initiate RT Inhaler-Nebulizer Bronchodilator Protocol order as well (see protocol at bottom of note). CXR Findings:  XR CHEST PORTABLE    Result Date: 7/13/2022  Interval worsening with increased pneumonia/consolidation left lung and mild-to-moderate congestion. XR CHEST PORTABLE    Result Date: 7/12/2022  Lines and tubes in satisfactory position with ill-defined opacifications in the left lung base and small to moderate size residual left pleural effusion. XR CHEST PORTABLE    Result Date: 7/12/2022  1. Endotracheal tube terminates in appropriate position above the nirali. The enteric tube courses off the field of view in the upper abdomen. 2. Improved aeration of the left upper lung field with persistent consolidation and/or effusion opacifying the left mid and lower lung fields. XR CHEST PORTABLE    Result Date: 7/12/2022  Persistent complete opacification of the left hemithorax. XR CHEST PORTABLE    Result Date: 7/11/2022  Interval complete opacification of the left hemithorax. The findings from the last RT Protocol Assessment were as follows:   History Pulmonary Disease: (P) Chronic pulmonary disease  Respiratory Pattern: (P) Dyspnea on exertion or RR 21-25 bpm  Breath Sounds: (P) Slightly diminished and/or crackles  Cough: (P) Unable to generate effective cough  Indication for Bronchodilator Therapy: (P) Mucolytic ordered  Bronchodilator Assessment Score: (P) 10    Aerosolized bronchodilator medication orders have been revised according to the RT Inhaler-Nebulizer Bronchodilator Protocol below. Respiratory Therapist to perform RT Therapy Protocol Assessment initially then follow the protocol.   Repeat RT Therapy Protocol Assessment PRN for score 0-3 or on second treatment, BID, and PRN for scores above 3.    No Indications - adjust the frequency to every 6 hours PRN wheezing or bronchospasm, if no treatments needed after 48 hours then discontinue using Per Protocol order mode. If indication present, adjust the RT bronchodilator orders based on the Bronchodilator Assessment Score as indicated below. Use Inhaler orders unless patient has one or more of the following: on home nebulizer, not able to hold breath for 10 seconds, is not alert and oriented, cannot activate and use MDI correctly, or respiratory rate 25 breaths per minute or more, then use the equivalent nebulizer order(s) with same Frequency and PRN reasons based on the score. If a patient is on this medication at home then do not decrease Frequency below that used at home. 0-3 - enter or revise RT bronchodilator order(s) to equivalent RT Bronchodilator order with Frequency of every 4 hours PRN for wheezing or increased work of breathing using Per Protocol order mode. 4-6 - enter or revise RT Bronchodilator order(s) to two equivalent RT bronchodilator orders with one order with BID Frequency and one order with Frequency of every 4 hours PRN wheezing or increased work of breathing using Per Protocol order mode. 7-10 - enter or revise RT Bronchodilator order(s) to two equivalent RT bronchodilator orders with one order with TID Frequency and one order with Frequency of every 4 hours PRN wheezing or increased work of breathing using Per Protocol order mode. 11-13 - enter or revise RT Bronchodilator order(s) to one equivalent RT bronchodilator order with QID Frequency and an Albuterol order with Frequency of every 4 hours PRN wheezing or increased work of breathing using Per Protocol order mode.       Greater than 13 - enter or revise RT Bronchodilator order(s) to one equivalent RT bronchodilator order with every 4 hours Frequency and an Albuterol order with Frequency of every 2 hours PRN wheezing or increased work of breathing using Per Protocol order mode.          Electronically signed by Jackie Moran RCP on 7/13/2022 at 12:06 PM

## 2022-07-13 NOTE — PROGRESS NOTES
[facility] Progress Note           Subjective/   80y.o. year old female who we are seeing in consultation for FARHEEN/CKD. HPI:  Patient was seen and examined. Doing much worse. Has moved to the ICU and she is intubated requiring high FiO2. BP soft and renal function is worse. ROS: No fever or chills. + edema.    Social:   at bedside     Objective/   GEN:  Chronically ill, BP (!) 97/49   Pulse (!) 107   Temp 98.8 °F (37.1 °C) (Bladder)   Resp 20   Ht 5' 6\" (1.676 m)   Wt 250 lb 3.2 oz (113.5 kg)   SpO2 97%   BMI 40.38 kg/m²    From 7/4  HEENT: non-icteric, atraumatic  CV: S1, S2 without r/g; + LE edema  RESP: On vent, no wheezing   ABD: +bs, soft, nt, no hsm  SKIN: warm, no rashes    Data/  Recent Labs     07/11/22  0552 07/12/22  0354 07/13/22  0450   WBC 10.0 11.2* 13.0*   HGB 10.0* 9.9* 9.4*   HCT 30.5* 31.1* 29.0*   MCV 86.0 87.1 84.9    311 339     Recent Labs     07/11/22 0552 07/12/22  0354 07/13/22  0450    140 138   K 4.3 3.7 4.0   CL 97* 95* 92*   CO2 33* 34* 31   GLUCOSE 94 92 107*   PHOS 4.3 3.8 3.8   MG 1.80 1.70* 2.30   BUN 25* 25* 31*   CREATININE 1.7* 1.8* 2.6*   LABGLOM 29* 27* 18*   GFRAA 35* 33* 21*     Component      Latest Ref Rng & Units 6/22/2022 6/22/2022           4:10 PM  4:10 PM   Color, UA      Straw/Yellow Yellow    Clarity, UA      Clear Clear    Glucose, UA      Negative mg/dL Negative    Bilirubin, Urine      Negative Negative    Ketones, Urine      Negative mg/dL TRACE (A)    Specific Gravity, UA      1.005 - 1.030 1.025    Blood, Urine      Negative Negative    pH, UA      5.0 - 8.0 5.5    Protein, UA      Negative mg/dL TRACE (A)    Urobilinogen, Urine      <2.0 E.U./dL 0.2    Nitrite, Urine      Negative Negative    Leukocyte Esterase, Urine      Negative Negative    Microscopic Examination       YES    Urine Type       NotGiven    Mucus, UA      None Seen /LPF Rare (A)    WBC, UA      0 - 5 /HPF 10-20 (A)    RBC, UA 0 - 4 /HPF 3-4    Epithelial Cells, UA      0 - 5 /HPF 0-1    Bacteria, UA      None Seen /HPF 2+ (A)    Sodium, Ur      Not Established mmol/L  <20   Potassium, Ur      Not Established mmol/L  26.3   Chloride      Not Established mmol/L  <20       Assessment/     - Acute kidney injury - multi-factorial ATN injury in setting of aspiration PNA, hypotension, & elevated vancomycin levels. Scr is up to  2.6 with hypotension and attempts at diuresis     - Chronic kidney disease stage 3b - background DM/HTN/CHF              SCr 1.6              Follows with [facility]     - Acute hypoxemic respiratory failure/Aspiration PNA/Pulmonary edema    Worsening, now intubated   S/p bronch      - Diastolic CHF     - Atrial fibrillation    - Anemia - prn prbc's     - Hypertension   Range is now low     - Hypokalemia, improved. -Metabolic acidosis, resolved    -Dysphagia, patient has been started on some puréed food    Plan/     Holding lasix  IV albumin   Follow labs  Prognosis is poor     ____________________________________  Libby Díaz MD  [facility]  [email or web address]  Office: [phone]

## 2022-07-13 NOTE — PROGRESS NOTES
Reassessment completed at this time. Have been able to wean pt down to 60% / 12 on the vent. Pt tolerating supine position. Pt continues to have decreased urinary output. Son and daughter in law at bedside. Questions  Answered and update on current POC.

## 2022-07-13 NOTE — PROGRESS NOTES
Pulmonary & Critical Care Medicine ICU Progress Note    CC: shortness of breath     Events of Last 24 hours:   Patient still on mechanical ventilation with PEEP of 14    She is requiring mild dose of pressors    Chest x-ray still showing infiltrate bilaterally    Status post second bronchoscopy did not show much secretions    Bedside echo shows a effusion on the left side    Vascular lines: IV: PICC 6/19/2022 P      Intake/Output Summary (Last 24 hours) at 7/13/2022 0831  Last data filed at 7/12/2022 1626  Gross per 24 hour   Intake 1082.99 ml   Output 650 ml   Net 432.99 ml         MV: 6/18/2022 -6/27/22  Vent Mode: AC/VC Resp Rate (Set): 20 bmp/Vt (Set, mL): 440 mL/ /FiO2 : 90 %  Recent Labs     07/12/22  0500 07/13/22  0450   PHART 7.508* 7.597*   DRM6INA 50.5* 33.9*   PO2ART 55.6* 67.2*       IV:   propofol 40 mcg/kg/min (07/13/22 0830)    norepinephrine 1 mcg/min (07/12/22 1853)    sodium chloride      sodium chloride 5 mL/hr at 07/10/22 0747       Vitals:  Blood pressure 94/66, pulse 98, temperature 98.8 °F (37.1 °C), temperature source Bladder, resp. rate 18, height 5' 6\" (1.676 m), weight 250 lb 3.2 oz (113.5 kg), SpO2 96 %, not currently breastfeeding. On  5 L  Constitutional: Intubated and sedated  HENT:  Oropharynx is clear and moist.   Neck: No tracheal deviation present. Cardiovascular: Normal heart sounds. No lower extremity edema. Pulmonary/Chest: no breath sound left side   Musculoskeletal: No cyanosis. No clubbing. Skin: Skin is warm and dry. Psychiatric: Normal mood and affect.   Neurologic: Debated and sedated         Scheduled Meds:   [Held by provider] furosemide  80 mg IntraVENous TID    potassium bicarb-citric acid  20 mEq Oral Once    sodium bicarbonate  650 mg Oral Daily    potassium bicarb-citric acid  20 mEq Oral Daily    acetylcysteine  600 mg Inhalation BID    metoprolol tartrate  50 mg Oral BID    ipratropium-albuterol  1 ampule Inhalation Q4H While awake    pantoprazole  40 mg IntraVENous BID    chlorhexidine  15 mL Mouth/Throat BID    sodium chloride flush  10 mL IntraVENous 2 times per day    [Held by provider] apixaban  2.5 mg Oral BID    atorvastatin  40 mg Oral Nightly    carbidopa-levodopa  1 tablet Oral 4x Daily    DULoxetine  60 mg Oral Daily    lamoTRIgine  50 mg Oral BID    montelukast  10 mg Oral Daily    propafenone  150 mg Oral 3 times per day       Data:  CBC:   Recent Labs     07/11/22  0552 07/12/22  0354 07/13/22  0450   WBC 10.0 11.2* 13.0*   HGB 10.0* 9.9* 9.4*   HCT 30.5* 31.1* 29.0*   MCV 86.0 87.1 84.9    311 339     BMP:   Recent Labs     07/11/22  0552 07/12/22  0354 07/13/22  0450    140 138   K 4.3 3.7 4.0   CL 97* 95* 92*   CO2 33* 34* 31   PHOS 4.3 3.8 3.8   BUN 25* 25* 31*   CREATININE 1.7* 1.8* 2.6*     LIVER PROFILE:   No results for input(s): AST, ALT, LIPASE, BILIDIR, BILITOT, ALKPHOS in the last 72 hours. Invalid input(s): AMYLASE,  ALB    Microbiology:  6/17/2022 SARS-CoV-2 and influenza are negative  6/17/2022 MRSA DNA screen positive  6/17/2022 blood NG  6/17/2022 sputum NRF  6/18/2022 tracheal aspirate NRF  6/19/2022 BAL Candida  6/19/2022 respiratory viral panel negative  7/1/22 Blood cx NGTD  7/7/2022 bronchial washings and BAL Candida krusei      Imaging:  CT chest 7/3/22  images reviewed by me and showed:   1. Multifocal bronchopneumonia, with progression since 06/17/2022.   2. Small bilateral pleural effusions  3. Narrowing of the AP diameter of the trachea  4.  Mild cardiomegaly    ASSESSMENT:  · Acute hypoxemic respiratory failure, initially on vent, extubated but then returned to ICU and required BiPAP  · Aspiration pneumonia - recurrent with septic shock mucous plugging of bronchi on bronchoscopy 7/7/2022  · Tracheobronchomalacia on bronchoscopy 7/7/2022 -  perhaps limiting airway clearance  · BETSY on BiPAP 22/15 with 1 L bleed in   · Parkinson's disease  · Atrial fibrillation  · Acute on chronic kidney failure, baseline creatinine 1.6  · Acute on chronic anemia, s/p 2 U PRBC 6/23/2022  · H/O esophagitis  · Pleural effusions are small bilaterally    PLAN:  Status post bronchoscopy twice, today bronchoscopy shows no significant secretion much better and clear airway  Chest x-ray still showing not fully irrigated left lung  Long discussion with the family they would like to give her 1 to 2 days    Wean pressure off wean FiO2  S/p bronch ,large mucus plug was removed   Mucomyst 3 ML 20% inhalation BID  Metaneb  NT suctioning as needed   Chest vest   All abx managed by ID ,stopped   Fluconazole stopped   ID following  Nephrology following, hold diuresis  On Eliquis will hold will change to heparin in case we decided to do thoracentesis as she has large effusion in the left however with her position it will be very hard to attempt will discuss with family risk and benefit    discussed with  in detail    Will start trophic feeding      Care reviewed with nursing staff, medical and surgical specialty care, primary care and the patient's family as available. Chart review/lab review/X-ray viewing/documentation and had long Conversation with patient/family re: prognosis, care options and any end of life issues:      Critical care time spent reviewing labs/films, examining patient, collaborating with other physicians more than 35  Minutes  excluding procedures . Amanda Martin M.D.   7/13/2022  8:31 AM

## 2022-07-13 NOTE — PROGRESS NOTES
07/12/22 1944   NICU Vent Information   Vent Type 980   Vent Mode AC/VC   Vt (Set, mL) 440 mL   Vt Exhaled 460 mL   Resp Rate (Set) 20 bmp   Rate Measured 20 br/min   Minute Volume 8.7 Liters   Peak Flow 60 L/min   FiO2  100 %   Peak Inspiratory Pressure 35 cmH2O   I:E Ratio 1:2.4   Sensitivity 3   PEEP/CPAP (cmH2O) 12   Mean Airway Pressure 19 cmH20   Plateau Pressure 32 CSP99   Static Compliance 23 mL/cmH2O   Dynamic Compliance 20 mL/cmH2O   Cough/Sputum   Sputum How Obtained Suctioned;Endotracheal   Cough Non-productive   Sputum Amount None   Additional Respiratory Assessments   Heart Rate (!) 104   Resp 20   SpO2 91 %   Position Semi-Mccollum's   Humidification Source Heated wire   Humidification Temp 36.3   Circuit Condensation Drained   Vent Alarm Settings   High Pressure  40 cmH2O   Low Minute Volume Alarm 4 L/min   Low Exhaled Vt  210 mL   Vt High  750 mL   Apnea (Secs) 20 secs   Ambu Bag With Mask At Bedside Yes   ETT (adult)   Placement Date/Time: 07/12/22 1833   Placed By: Licensed provider  Placement Verified By: Chest X-ray;Colorimetric ETCO2 device;Direct visualization  Preoxygenation: Yes  Mask Ventilation: Ventilated by mask (1)  Technique: Rapid sequence;Direct laryn. ..    Secured At 22 cm   Measured From Lips   ETT Placement Left   Secured By Commercial tube hughes   Site Assessment Dry

## 2022-07-13 NOTE — PROGRESS NOTES
Pt intubated and sedated. Intubated with # 7.5 at the # 23 LL. Vent settings are AC 20 / 440 / 90% / 12. Breath sounds are rhonchorous. Heart rate is ST on the bedside monitor with rates in the 113s. Generalized edema noted to all extremities. Scheduled meds given per orders. OG at 65cm. Bile colored secretions noted with withdraw of gastric content. LUE dual lumen PICC patent and working well. Propofol infusing. RASS -3  CPOT 0    Zimmerman patent and draining clear yellow urine.

## 2022-07-13 NOTE — PROGRESS NOTES
Hospitalist Progress Note      PCP: Nohemi Sebastian MD    Date of Admission: 6/17/2022    Hospital Course:   Admitted for resp failure from CHF , pna, and unable to manage own secretions with advanced parkinson's, eventually intubated    Prolonged vent support - there were discussions about trach and PEG support, though able to extubate eventually. Patient was extubated on 6/27/2022. She was then placed on BiPAP. Continued on O2 daytime and BiPAP HS. Transferred out of ICU    Secretions remain a major problem. Resp culture (BAL) growing candida - started antifungal.     Repeat emesis 7/1 and spiking high fever and worsening O2 requirement - she was transferred back to ICU on 7/1 overnight. Continued on BiPAP    7/7- S.p bronch with clearance of mucous plugs   S/p FEES by SLP team    Transferred out of ICU to PCU on 7/9      Subjective:   7/11- doing better. On Bipap. Wants bipap removed this am. No fever. 7/12  Patient was transferred back to the ICU for worsening respiratory status. Placed on BiPAP. Has excessive upper airway secretions  Chest x-ray showed complete opacification of left hemithorax    7/13  Intubated yesterday. Underwent bronchoscopy yesterday and today.       Medications:  Reviewed    Infusion Medications    propofol 40 mcg/kg/min (07/13/22 6216)    norepinephrine 1 mcg/min (07/12/22 1958)    sodium chloride      sodium chloride 5 mL/hr at 07/10/22 0747     Scheduled Medications    [Held by provider] furosemide  80 mg IntraVENous TID    potassium bicarb-citric acid  20 mEq Oral Once    sodium bicarbonate  650 mg Oral Daily    potassium bicarb-citric acid  20 mEq Oral Daily    acetylcysteine  600 mg Inhalation BID    metoprolol tartrate  50 mg Oral BID    ipratropium-albuterol  1 ampule Inhalation Q4H While awake    pantoprazole  40 mg IntraVENous BID    chlorhexidine  15 mL Mouth/Throat BID    sodium chloride flush  10 mL IntraVENous 2 times per day    [Held by provider] apixaban  2.5 mg Oral BID    atorvastatin  40 mg Oral Nightly    carbidopa-levodopa  1 tablet Oral 4x Daily    DULoxetine  60 mg Oral Daily    lamoTRIgine  50 mg Oral BID    montelukast  10 mg Oral Daily    propafenone  150 mg Oral 3 times per day     PRN Meds: midazolam, fentanNYL, potassium chloride, magnesium sulfate, sodium phosphate IVPB **OR** sodium phosphate IVPB, morphine, carboxymethylcellulose PF, sodium chloride, sodium chloride flush, sodium chloride, promethazine **OR** ondansetron, acetaminophen **OR** acetaminophen, albuterol      Intake/Output Summary (Last 24 hours) at 7/13/2022 0801  Last data filed at 7/12/2022 1626  Gross per 24 hour   Intake 1082.99 ml   Output 650 ml   Net 432.99 ml       Physical Exam Performed:    BP (!) 83/52   Pulse 99   Temp 98.5 °F (36.9 °C) (Bladder)   Resp 20   Ht 5' 6\" (1.676 m)   Wt 250 lb 3.2 oz (113.5 kg)   SpO2 90%   BMI 40.38 kg/m²     General: Sedated, intubated  Mucous Membranes:  Pink , anicteric  Left opaque cornea  Neck: No JVD, no carotid bruit, no thyromegaly  Chest: Diminished breath sounds with diffuse rhonchi. No wheezes  Cardiovascular:  RRR S1S2 heard, no murmurs or gallops  Abdomen:  Soft, obese  undistended, non tender, no organomegaly, BS present  Extremities: Left UE picc noted  No edema or cyanosis. Distal pulses well felt  Neurological : Sedated        Labs:   Recent Labs     07/11/22  0552 07/12/22  0354 07/13/22  0450   WBC 10.0 11.2* 13.0*   HGB 10.0* 9.9* 9.4*   HCT 30.5* 31.1* 29.0*    311 339     Recent Labs     07/11/22  0552 07/12/22  0354 07/13/22  0450    140 138   K 4.3 3.7 4.0   CL 97* 95* 92*   CO2 33* 34* 31   BUN 25* 25* 31*   CREATININE 1.7* 1.8* 2.6*   CALCIUM 10.1 10.3 10.0   PHOS 4.3 3.8 3.8     No results for input(s): AST, ALT, BILIDIR, BILITOT, ALKPHOS in the last 72 hours. No results for input(s): INR in the last 72 hours.   No results for input(s): Enid Taylor in the last 72 hours. Urinalysis:      Lab Results   Component Value Date/Time    NITRU Negative 07/12/2022 01:33 PM    WBCUA 3-5 07/12/2022 01:33 PM    BACTERIA Rare 07/12/2022 01:33 PM    RBCUA 0-2 07/12/2022 01:33 PM    BLOODU TRACE-LYSED 07/12/2022 01:33 PM    SPECGRAV 1.010 07/12/2022 01:33 PM    GLUCOSEU Negative 07/12/2022 01:33 PM       Radiology:  XR CHEST PORTABLE   Final Result   Lines and tubes in satisfactory position with ill-defined opacifications in   the left lung base and small to moderate size residual left pleural effusion. XR CHEST PORTABLE   Final Result   1. Endotracheal tube terminates in appropriate position above the nirali. The enteric tube courses off the field of view in the upper abdomen. 2. Improved aeration of the left upper lung field with persistent   consolidation and/or effusion opacifying the left mid and lower lung fields. XR CHEST PORTABLE   Final Result   Persistent complete opacification of the left hemithorax. XR CHEST PORTABLE   Final Result   Interval complete opacification of the left hemithorax. CT CHEST WO CONTRAST   Final Result   1. Multifocal bronchopneumonia, with progression since 06/17/2022.   2. Small bilateral pleural effusions. 3. Narrowing of the AP diameter of the trachea. 4. Mild cardiomegaly. XR CHEST PORTABLE   Final Result   Airspace disease within the mid to lower lung zones is slightly increased as   compared to prior. Possible small left pleural effusion. CT ABDOMEN PELVIS WO CONTRAST Additional Contrast? None   Final Result   1. Nonspecific, nonobstructive gaseous bowel pattern. 2. Extensive diverticulosis of the left colon, lacking acute inflammatory   changes. 3. Bibasilar consolidations, atelectasis versus pneumonia. Pleural effusions   have resolved since 06/23/2022. XR ABDOMEN (KUB) (SINGLE AP VIEW)   Final Result   1.  Gaseous distention of the stomach, otherwise, unremarkable bowel gas   pattern. XR CHEST PORTABLE   Final Result   Stable chest.  Persistent left lower lobe opacification with probable   effusion. XR CHEST PORTABLE   Final Result   Persistent left lower lobe opacification and effusion. Interval clearing of   perihilar edema and right basilar opacification and effusion. XR CHEST PORTABLE   Final Result   No significant interval change in bilateral airspace disease and small   effusions. Unchanged positioning of support devices. XR CHEST PORTABLE   Final Result   Increasing basilar opacities with probable small effusions. Unchanged positioning of support devices. XR CHEST PORTABLE   Final Result   1. No significant change. XR CHEST PORTABLE   Final Result   No significant interval change in basilar predominant airspace disease and   small left greater than right pleural effusions. Pulmonary vascular   congestion. CT ABDOMEN PELVIS WO CONTRAST Additional Contrast? None   Final Result   Colonic diverticulosis without acute diverticulitis. Nonspecific rectal wall   thickening. Partially visualized pleural effusions with adjacent compressive   atelectasis. XR CHEST PORTABLE   Final Result   Again identified is bibasilar airspace disease with small pleural effusions   and mild increased right perihilar infiltrates. This may be related to   atelectasis or pneumonia. Cardiomegaly. XR CHEST PORTABLE   Final Result   Stable examination with bilateral small effusions and bibasilar atelectasis. Stable ET tube and enteric tube. XR CHEST PORTABLE   Final Result   No acute process. Bibasilar hypoaeration      Stable cardiomegaly         IR PICC WO SQ PORT/PUMP > 5 YEARS   Final Result      XR CHEST PORTABLE   Final Result   Supportive tubing projects in stable position. Partial regression of atelectasis in the right lung base.   Persisting   consolidation or or atelectasis in the bronchi    Atrial fibrillation, chronic (HCC)    Parkinson disease ([facility] [address]    Chronic kidney disease    Acute kidney injury superimposed on CKD ([facility] [address]  Resolved Problems:    Shortness of breath    Acute respiratory failure with hypoxia (HCC)         Acute Hypox Resp Failure   Aspiration PNA   Treated with  IV Zosyn and vancomycin. Changed Zosyn to cefepime. DCd vancomycin 6/22. Finished 10 days of Vanco and cefepime  Intubated 6/18. Extubated on 6/27/2022.  -On BiPAP. Alternate BiPAP with Airvo. - SLP involved. - resp culture from BAL is growing candida - started diflucan on 7/1/2022   - spiking high fever 7/1 - back on Abx with Vanc and merrem IV.    - now on 4 L-> 5L o2 , weaning as able               - poor physical condition limiting clearance of resp secretions- sp repeat bronch with clearing of mucous on 7/7/22 . On inhaled Mucomyst     7/12  Transferred back to ICU for worsening respiratory status. Chest x-ray showed complete opacification left hemithorax. Placed on BiPAP. The plan is to intubate her and do a bronchoscopy. Currently not on any antibiotics   Intubated 7/12. Bronchoscopy with BAL cultures  Small left pleural effusion by bedside ultrasound    Sepsis POA - due to above. Recurrent with aspiration  Now off antibiotics    Dysphagia. Abnormal CT  Cough with every meal intake per family report. Underlying Parkinson's. - continues to struggle with secretions. - CT abd pelvis 7/2 - actually fairly unremarkable. Melena  GI bleed  - drop in H/H   -GI consulted  -H&H held stable.  -Has since resumed eliquis  - IV PPI BID      FARHEEN on CKD IIIB  -Likely secondary to ATN in the setting of pneumonia, hypotension and elevated vancomycin levels  -Had lasix gtt earlier this admission.  -Back on IV Lasix now, creatinine up to 1.8 today. Monitor BMP  Hold Lasix  Creatinine worsening.     Parkinson's disease   - on Sinemet.       Afib Paroxysmal   -Rythmol   -Eliquis held     HTN   - BP on the low side. Nifedipine stopped due to this and also leg edema.    Consider alternative when restarting.     Generalized weakness and debility  -Consult PT OT     DVT Prophylaxis: heparin sc   eliquis held for bloody secretions seen during bronch and melena  Diet: Diet NPO  Code Status: Full Code      Asher Izquierdo MD 7/13/2022 8:01 AM

## 2022-07-13 NOTE — PROGRESS NOTES
.Consent verified for bronchoscopy. Timeout per policy. Procedure performed by Dr. Carlos Alfaro. Bronchoscopy assist performed on 100% FiO2.    0ml of 1% lidocaine instilled and 15 ml of 9% normal saline instilled. Pt tolerated bronchoscopy without difficulty, airway support per RTD. Patient SpO2 within acceptable range throughout bronchoscopy procedure. Sedation provided/monitored per nurse. No s/s distress, no complications noted. See physician notes. Continue plan of care.

## 2022-07-13 NOTE — CARE COORDINATION
INTERDISCIPLINARY PLAN OF CARE CONFERENCE    Date/Time: 7/13/2022 9:34 AM  Completed by: JENNIFER Cunningham   Case Management      Patient Name:  Carlee Duffy  YOB: 1940  Admitting Diagnosis: Shortness of breath [R06.02]  Septicemia ([facility] [address] [A41.9]  Chronic anemia [D64.9]  Acute respiratory failure with hypoxia ([facility] [address] [J96.01]  Aspiration pneumonia of left lower lobe, unspecified aspiration pneumonia type ([facility] [address] [J69.0]  Chronic kidney disease, unspecified CKD stage [N18.9]     Admit Date/Time:  6/17/2022 12:50 AM    Chart reviewed. Interdisciplinary team contacted or reviewed plan related to patient progress and discharge plans. Disciplines included Case Management, Nursing, and Dietitian. Current Status:ongoing   PT/OT recommendation for discharge plan of care: will need new orders when appropriate     Expected D/C Disposition:  TBD  Confirmed plan with patient and/or family Yes confirmed with: (name) pt's  at bedside    Discharge Plan Comments: Chart review completed. Spoke with RN who states pt is requiring increase in o2. Spoke with Palliative Care RN who states she will follow up with pt's ,     Met with pt's  at bedside. Pt remains on a Vent. Pt's  aware that [facility] is able to accept pt. He stated \"we just have to see how the next few days go\". He denied needs for CM. CM will follow. Please notify CM if needs or concerns arise.      Home O2 in place on admit: Yes

## 2022-07-14 ENCOUNTER — APPOINTMENT (OUTPATIENT)
Dept: GENERAL RADIOLOGY | Age: 82
DRG: 004 | End: 2022-07-14
Payer: MEDICARE

## 2022-07-14 ENCOUNTER — ANESTHESIA EVENT (OUTPATIENT)
Dept: OPERATING ROOM | Age: 82
DRG: 004 | End: 2022-07-14
Payer: MEDICARE

## 2022-07-14 LAB
ALBUMIN SERPL-MCNC: 4 G/DL (ref 3.4–5)
ANION GAP SERPL CALCULATED.3IONS-SCNC: 15 MMOL/L (ref 3–16)
BASE EXCESS VENOUS: 7.8 MMOL/L (ref -3–3)
BASOPHILS ABSOLUTE: 0 K/UL (ref 0–0.2)
BASOPHILS RELATIVE PERCENT: 0.5 %
BUN BLDV-MCNC: 39 MG/DL (ref 7–20)
CALCIUM SERPL-MCNC: 9.6 MG/DL (ref 8.3–10.6)
CARBOXYHEMOGLOBIN: 0.2 % (ref 0–1.5)
CHLORIDE BLD-SCNC: 95 MMOL/L (ref 99–110)
CO2: 28 MMOL/L (ref 21–32)
CREAT SERPL-MCNC: 2.9 MG/DL (ref 0.6–1.2)
CULTURE, RESPIRATORY: NORMAL
EOSINOPHILS ABSOLUTE: 0.6 K/UL (ref 0–0.6)
EOSINOPHILS RELATIVE PERCENT: 6.1 %
GFR AFRICAN AMERICAN: 19
GFR NON-AFRICAN AMERICAN: 16
GLUCOSE BLD-MCNC: 122 MG/DL (ref 70–99)
GLUCOSE BLD-MCNC: 130 MG/DL (ref 70–99)
GLUCOSE BLD-MCNC: 137 MG/DL (ref 70–99)
GLUCOSE BLD-MCNC: 137 MG/DL (ref 70–99)
GRAM STAIN RESULT: NORMAL
HCO3 VENOUS: 31.4 MMOL/L (ref 23–29)
HCT VFR BLD CALC: 21.7 % (ref 36–48)
HCT VFR BLD CALC: 22.8 % (ref 36–48)
HEMOGLOBIN: 7.2 G/DL (ref 12–16)
HEMOGLOBIN: 7.5 G/DL (ref 12–16)
LYMPHOCYTES ABSOLUTE: 1 K/UL (ref 1–5.1)
LYMPHOCYTES RELATIVE PERCENT: 9.5 %
MAGNESIUM: 2 MG/DL (ref 1.8–2.4)
MCH RBC QN AUTO: 28.2 PG (ref 26–34)
MCHC RBC AUTO-ENTMCNC: 33.1 G/DL (ref 31–36)
MCV RBC AUTO: 85.3 FL (ref 80–100)
METHEMOGLOBIN VENOUS: 0.3 %
MONOCYTES ABSOLUTE: 0.9 K/UL (ref 0–1.3)
MONOCYTES RELATIVE PERCENT: 8.8 %
NEUTROPHILS ABSOLUTE: 7.6 K/UL (ref 1.7–7.7)
NEUTROPHILS RELATIVE PERCENT: 75.1 %
O2 SAT, VEN: 68 %
O2 THERAPY: ABNORMAL
PCO2, VEN: 40.3 MMHG (ref 40–50)
PDW BLD-RTO: 13.6 % (ref 12.4–15.4)
PERFORMED ON: ABNORMAL
PH VENOUS: 7.51 (ref 7.35–7.45)
PHOSPHORUS: 3.3 MG/DL (ref 2.5–4.9)
PLATELET # BLD: 240 K/UL (ref 135–450)
PMV BLD AUTO: 8.5 FL (ref 5–10.5)
PO2, VEN: 32 MMHG (ref 25–40)
POTASSIUM SERPL-SCNC: 3.6 MMOL/L (ref 3.5–5.1)
PROCALCITONIN: 0.36 NG/ML (ref 0–0.15)
RBC # BLD: 2.67 M/UL (ref 4–5.2)
SODIUM BLD-SCNC: 138 MMOL/L (ref 136–145)
TCO2 CALC VENOUS: 33 MMOL/L
WBC # BLD: 10.2 K/UL (ref 4–11)

## 2022-07-14 PROCEDURE — 2700000000 HC OXYGEN THERAPY PER DAY

## 2022-07-14 PROCEDURE — 2580000003 HC RX 258: Performed by: INTERNAL MEDICINE

## 2022-07-14 PROCEDURE — 85018 HEMOGLOBIN: CPT

## 2022-07-14 PROCEDURE — 94003 VENT MGMT INPAT SUBQ DAY: CPT

## 2022-07-14 PROCEDURE — 6370000000 HC RX 637 (ALT 250 FOR IP): Performed by: INTERNAL MEDICINE

## 2022-07-14 PROCEDURE — 2000000000 HC ICU R&B

## 2022-07-14 PROCEDURE — 99291 CRITICAL CARE FIRST HOUR: CPT | Performed by: INTERNAL MEDICINE

## 2022-07-14 PROCEDURE — 6360000002 HC RX W HCPCS: Performed by: INTERNAL MEDICINE

## 2022-07-14 PROCEDURE — 83735 ASSAY OF MAGNESIUM: CPT

## 2022-07-14 PROCEDURE — 85025 COMPLETE CBC W/AUTO DIFF WBC: CPT

## 2022-07-14 PROCEDURE — 94669 MECHANICAL CHEST WALL OSCILL: CPT

## 2022-07-14 PROCEDURE — 36415 COLL VENOUS BLD VENIPUNCTURE: CPT

## 2022-07-14 PROCEDURE — 99222 1ST HOSP IP/OBS MODERATE 55: CPT | Performed by: SURGERY

## 2022-07-14 PROCEDURE — 82803 BLOOD GASES ANY COMBINATION: CPT

## 2022-07-14 PROCEDURE — P9045 ALBUMIN (HUMAN), 5%, 250 ML: HCPCS | Performed by: INTERNAL MEDICINE

## 2022-07-14 PROCEDURE — 99233 SBSQ HOSP IP/OBS HIGH 50: CPT | Performed by: INTERNAL MEDICINE

## 2022-07-14 PROCEDURE — 36600 WITHDRAWAL OF ARTERIAL BLOOD: CPT

## 2022-07-14 PROCEDURE — 84145 PROCALCITONIN (PCT): CPT

## 2022-07-14 PROCEDURE — 2500000003 HC RX 250 WO HCPCS: Performed by: INTERNAL MEDICINE

## 2022-07-14 PROCEDURE — 94761 N-INVAS EAR/PLS OXIMETRY MLT: CPT

## 2022-07-14 PROCEDURE — 71045 X-RAY EXAM CHEST 1 VIEW: CPT

## 2022-07-14 PROCEDURE — 94640 AIRWAY INHALATION TREATMENT: CPT

## 2022-07-14 PROCEDURE — 80069 RENAL FUNCTION PANEL: CPT

## 2022-07-14 PROCEDURE — C9113 INJ PANTOPRAZOLE SODIUM, VIA: HCPCS | Performed by: INTERNAL MEDICINE

## 2022-07-14 PROCEDURE — 85014 HEMATOCRIT: CPT

## 2022-07-14 RX ORDER — FUROSEMIDE 10 MG/ML
80 INJECTION INTRAMUSCULAR; INTRAVENOUS 2 TIMES DAILY
Status: DISCONTINUED | OUTPATIENT
Start: 2022-07-14 | End: 2022-07-17

## 2022-07-14 RX ORDER — SENNA AND DOCUSATE SODIUM 50; 8.6 MG/1; MG/1
1 TABLET, FILM COATED ORAL 2 TIMES DAILY
Status: DISCONTINUED | OUTPATIENT
Start: 2022-07-14 | End: 2022-07-19 | Stop reason: HOSPADM

## 2022-07-14 RX ORDER — ALBUMIN, HUMAN INJ 5% 5 %
25 SOLUTION INTRAVENOUS EVERY 6 HOURS
Status: DISPENSED | OUTPATIENT
Start: 2022-07-14 | End: 2022-07-15

## 2022-07-14 RX ADMIN — SENNOSIDES AND DOCUSATE SODIUM 1 TABLET: 50; 8.6 TABLET ORAL at 20:21

## 2022-07-14 RX ADMIN — FUROSEMIDE 80 MG: 10 INJECTION, SOLUTION INTRAMUSCULAR; INTRAVENOUS at 17:01

## 2022-07-14 RX ADMIN — MIDAZOLAM HYDROCHLORIDE 2 MG: 2 INJECTION, SOLUTION INTRAMUSCULAR; INTRAVENOUS at 00:10

## 2022-07-14 RX ADMIN — ALBUMIN (HUMAN) 25 G: 12.5 INJECTION, SOLUTION INTRAVENOUS at 10:29

## 2022-07-14 RX ADMIN — IPRATROPIUM BROMIDE AND ALBUTEROL SULFATE 1 AMPULE: 2.5; .5 SOLUTION RESPIRATORY (INHALATION) at 19:33

## 2022-07-14 RX ADMIN — NOREPINEPHRINE BITARTRATE 1 MCG/MIN: 1 INJECTION INTRAVENOUS at 18:14

## 2022-07-14 RX ADMIN — Medication 0.5 MCG/KG/HR: at 02:15

## 2022-07-14 RX ADMIN — ACETAMINOPHEN 325MG 650 MG: 325 TABLET ORAL at 20:37

## 2022-07-14 RX ADMIN — IPRATROPIUM BROMIDE AND ALBUTEROL SULFATE 1 AMPULE: 2.5; .5 SOLUTION RESPIRATORY (INHALATION) at 23:29

## 2022-07-14 RX ADMIN — Medication 100 MCG/HR: at 23:16

## 2022-07-14 RX ADMIN — IPRATROPIUM BROMIDE AND ALBUTEROL SULFATE 1 AMPULE: 2.5; .5 SOLUTION RESPIRATORY (INHALATION) at 08:50

## 2022-07-14 RX ADMIN — PANTOPRAZOLE SODIUM 40 MG: 40 INJECTION, POWDER, LYOPHILIZED, FOR SOLUTION INTRAVENOUS at 08:08

## 2022-07-14 RX ADMIN — SODIUM CHLORIDE, PRESERVATIVE FREE 10 ML: 5 INJECTION INTRAVENOUS at 08:08

## 2022-07-14 RX ADMIN — SODIUM BICARBONATE 650 MG: 650 TABLET ORAL at 08:08

## 2022-07-14 RX ADMIN — FENTANYL CITRATE 50 MCG: 50 INJECTION, SOLUTION INTRAMUSCULAR; INTRAVENOUS at 00:09

## 2022-07-14 RX ADMIN — ACETYLCYSTEINE 600 MG: 200 INHALANT RESPIRATORY (INHALATION) at 19:33

## 2022-07-14 RX ADMIN — Medication 15 ML: at 20:21

## 2022-07-14 RX ADMIN — MIDAZOLAM HYDROCHLORIDE 2 MG: 2 INJECTION, SOLUTION INTRAMUSCULAR; INTRAVENOUS at 09:01

## 2022-07-14 RX ADMIN — SENNOSIDES AND DOCUSATE SODIUM 1 TABLET: 50; 8.6 TABLET ORAL at 10:01

## 2022-07-14 RX ADMIN — HEPARIN SODIUM 5000 UNITS: 5000 INJECTION INTRAVENOUS; SUBCUTANEOUS at 15:58

## 2022-07-14 RX ADMIN — CARBIDOPA AND LEVODOPA 1 TABLET: 25; 100 TABLET ORAL at 20:21

## 2022-07-14 RX ADMIN — Medication 0.6 MCG/KG/HR: at 10:00

## 2022-07-14 RX ADMIN — Medication 100 MCG/HR: at 11:41

## 2022-07-14 RX ADMIN — MIDAZOLAM HYDROCHLORIDE 2 MG: 2 INJECTION, SOLUTION INTRAMUSCULAR; INTRAVENOUS at 03:51

## 2022-07-14 RX ADMIN — CARBIDOPA AND LEVODOPA 1 TABLET: 25; 100 TABLET ORAL at 12:44

## 2022-07-14 RX ADMIN — Medication 15 ML: at 08:08

## 2022-07-14 RX ADMIN — LAMOTRIGINE 50 MG: 25 TABLET ORAL at 10:01

## 2022-07-14 RX ADMIN — ATORVASTATIN CALCIUM 40 MG: 40 TABLET, FILM COATED ORAL at 20:21

## 2022-07-14 RX ADMIN — Medication 0.6 MCG/KG/HR: at 23:14

## 2022-07-14 RX ADMIN — POTASSIUM CHLORIDE 20 MEQ: 29.8 INJECTION INTRAVENOUS at 13:31

## 2022-07-14 RX ADMIN — PROPAFENONE HYDROCHLORIDE 150 MG: 150 TABLET, FILM COATED ORAL at 06:17

## 2022-07-14 RX ADMIN — PANTOPRAZOLE SODIUM 40 MG: 40 INJECTION, POWDER, LYOPHILIZED, FOR SOLUTION INTRAVENOUS at 20:21

## 2022-07-14 RX ADMIN — ALBUMIN (HUMAN) 25 G: 12.5 INJECTION, SOLUTION INTRAVENOUS at 17:08

## 2022-07-14 RX ADMIN — POTASSIUM CHLORIDE 20 MEQ: 29.8 INJECTION INTRAVENOUS at 12:47

## 2022-07-14 RX ADMIN — PROPAFENONE HYDROCHLORIDE 150 MG: 150 TABLET, FILM COATED ORAL at 16:24

## 2022-07-14 RX ADMIN — CARBIDOPA AND LEVODOPA 1 TABLET: 25; 100 TABLET ORAL at 08:08

## 2022-07-14 RX ADMIN — ALBUMIN (HUMAN) 25 G: 12.5 INJECTION, SOLUTION INTRAVENOUS at 04:23

## 2022-07-14 RX ADMIN — MONTELUKAST SODIUM 10 MG: 10 TABLET, COATED ORAL at 08:08

## 2022-07-14 RX ADMIN — IPRATROPIUM BROMIDE AND ALBUTEROL SULFATE 1 AMPULE: 2.5; .5 SOLUTION RESPIRATORY (INHALATION) at 15:19

## 2022-07-14 RX ADMIN — FUROSEMIDE 80 MG: 10 INJECTION, SOLUTION INTRAMUSCULAR; INTRAVENOUS at 11:39

## 2022-07-14 RX ADMIN — ALBUMIN (HUMAN) 25 G: 12.5 INJECTION, SOLUTION INTRAVENOUS at 21:59

## 2022-07-14 RX ADMIN — Medication 0.6 MCG/KG/HR: at 15:57

## 2022-07-14 RX ADMIN — PROPAFENONE HYDROCHLORIDE 150 MG: 150 TABLET, FILM COATED ORAL at 20:26

## 2022-07-14 RX ADMIN — CARBIDOPA AND LEVODOPA 1 TABLET: 25; 100 TABLET ORAL at 17:01

## 2022-07-14 RX ADMIN — LAMOTRIGINE 50 MG: 25 TABLET ORAL at 20:26

## 2022-07-14 RX ADMIN — IPRATROPIUM BROMIDE AND ALBUTEROL SULFATE 1 AMPULE: 2.5; .5 SOLUTION RESPIRATORY (INHALATION) at 11:25

## 2022-07-14 RX ADMIN — DULOXETINE HYDROCHLORIDE 60 MG: 60 CAPSULE, DELAYED RELEASE ORAL at 08:08

## 2022-07-14 RX ADMIN — ACETYLCYSTEINE 600 MG: 200 INHALANT RESPIRATORY (INHALATION) at 08:50

## 2022-07-14 RX ADMIN — FENTANYL CITRATE 50 MCG: 50 INJECTION, SOLUTION INTRAMUSCULAR; INTRAVENOUS at 03:51

## 2022-07-14 ASSESSMENT — PAIN SCALES - GENERAL
PAINLEVEL_OUTOF10: 5
PAINLEVEL_OUTOF10: 0

## 2022-07-14 ASSESSMENT — PULMONARY FUNCTION TESTS
PIF_VALUE: 32
PIF_VALUE: 32
PIF_VALUE: 38
PIF_VALUE: 33
PIF_VALUE: 37
PIF_VALUE: 35
PIF_VALUE: 38
PIF_VALUE: 34
PIF_VALUE: 34
PIF_VALUE: 37
PIF_VALUE: 35
PIF_VALUE: 34
PIF_VALUE: 38
PIF_VALUE: 36
PIF_VALUE: 34
PIF_VALUE: 34
PIF_VALUE: 33

## 2022-07-14 ASSESSMENT — PAIN SCALES - WONG BAKER: WONGBAKER_NUMERICALRESPONSE: 0

## 2022-07-14 NOTE — PROGRESS NOTES
Hospitalist Progress Note      PCP: Jeannie Fitch MD    Date of Admission: 6/17/2022    Hospital Course:   Admitted for resp failure from CHF , pna, and unable to manage own secretions with advanced parkinson's, eventually intubated    Prolonged vent support - there were discussions about trach and PEG support, though able to extubate eventually. Patient was extubated on 6/27/2022. She was then placed on BiPAP. Continued on O2 daytime and BiPAP HS. Transferred out of ICU    Secretions remain a major problem. Resp culture (BAL) growing candida - started antifungal.     Repeat emesis 7/1 and spiking high fever and worsening O2 requirement - she was transferred back to ICU on 7/1 overnight. Continued on BiPAP    7/7- S.p bronch with clearance of mucous plugs   S/p FEES by SLP team    Transferred out of ICU to PCU on 7/9      Subjective:   7/11- doing better. On Bipap. Wants bipap removed this am. No fever. 7/12  Patient was transferred back to the ICU for worsening respiratory status. Placed on BiPAP. Has excessive upper airway secretions  Chest x-ray showed complete opacification of left hemithorax    7/13  Intubated yesterday. Underwent bronchoscopy yesterday and today. 7/14  On Levophed.   Continues to be on the vent      Medications:  Reviewed    Infusion Medications    dexmedetomidine HCl in NaCl 0.6 mcg/kg/hr (07/14/22 0746)    fentaNYL 75 mcg/hr (07/14/22 0617)    norepinephrine 1 mcg/min (07/14/22 0617)    sodium chloride      sodium chloride 5 mL/hr at 07/10/22 0747     Scheduled Medications    heparin (porcine)  5,000 Units SubCUTAneous 3 times per day    albumin human  25 g IntraVENous Q6H    potassium bicarb-citric acid  20 mEq Oral Once    sodium bicarbonate  650 mg Oral Daily    acetylcysteine  600 mg Inhalation BID    ipratropium-albuterol  1 ampule Inhalation Q4H While awake    pantoprazole  40 mg IntraVENous BID    chlorhexidine  15 mL Mouth/Throat BID    sodium chloride flush  10 mL IntraVENous 2 times per day    atorvastatin  40 mg Oral Nightly    carbidopa-levodopa  1 tablet Oral 4x Daily    DULoxetine  60 mg Oral Daily    lamoTRIgine  50 mg Oral BID    montelukast  10 mg Oral Daily    propafenone  150 mg Oral 3 times per day     PRN Meds: midazolam, fentanNYL, potassium chloride, magnesium sulfate, sodium phosphate IVPB **OR** sodium phosphate IVPB, morphine, carboxymethylcellulose PF, sodium chloride, sodium chloride flush, sodium chloride, promethazine **OR** ondansetron, acetaminophen **OR** acetaminophen, albuterol      Intake/Output Summary (Last 24 hours) at 7/14/2022 0754  Last data filed at 7/14/2022 0631  Gross per 24 hour   Intake 2751.45 ml   Output 318 ml   Net 2433.45 ml       Physical Exam Performed:    BP (!) 127/50   Pulse 82   Temp 99.7 °F (37.6 °C) (Bladder)   Resp 20   Ht 5' 6\" (1.676 m)   Wt 253 lb 1.6 oz (114.8 kg)   SpO2 95%   BMI 40.85 kg/m²     General: Sedated, intubated  Mucous Membranes:  Pink , anicteric  Left opaque cornea  Neck: No JVD, no carotid bruit, no thyromegaly  Chest: Diminished breath sounds with diffuse rhonchi. No wheezes  Cardiovascular:  RRR S1S2 heard, no murmurs or gallops  Abdomen:  Soft, obese  undistended, non tender, no organomegaly, BS present  Extremities: Left UE picc noted  No edema or cyanosis. Distal pulses well felt  Neurological : Sedated        Labs:   Recent Labs     07/12/22 0354 07/13/22  0450 07/14/22  0540   WBC 11.2* 13.0* 10.2   HGB 9.9* 9.4* 7.5*   HCT 31.1* 29.0* 22.8*    339 240     Recent Labs     07/12/22  0354 07/13/22  0450 07/14/22  0540    138 138   K 3.7 4.0 3.6   CL 95* 92* 95*   CO2 34* 31 28   BUN 25* 31* 39*   CREATININE 1.8* 2.6* 2.9*   CALCIUM 10.3 10.0 9.6   PHOS 3.8 3.8 3.3     No results for input(s): AST, ALT, BILIDIR, BILITOT, ALKPHOS in the last 72 hours. No results for input(s): INR in the last 72 hours.   No results for input(s): CKTOTAL, TROPONINI in the last 72 hours. Urinalysis:      Lab Results   Component Value Date/Time    NITRU Negative 07/12/2022 01:33 PM    WBCUA 3-5 07/12/2022 01:33 PM    BACTERIA Rare 07/12/2022 01:33 PM    RBCUA 0-2 07/12/2022 01:33 PM    BLOODU TRACE-LYSED 07/12/2022 01:33 PM    SPECGRAV 1.010 07/12/2022 01:33 PM    GLUCOSEU Negative 07/12/2022 01:33 PM       Radiology:  XR CHEST PORTABLE   Final Result   Interval worsening with increased pneumonia/consolidation left lung and   mild-to-moderate congestion. XR CHEST PORTABLE   Final Result   Lines and tubes in satisfactory position with ill-defined opacifications in   the left lung base and small to moderate size residual left pleural effusion. XR CHEST PORTABLE   Final Result   1. Endotracheal tube terminates in appropriate position above the nirali. The enteric tube courses off the field of view in the upper abdomen. 2. Improved aeration of the left upper lung field with persistent   consolidation and/or effusion opacifying the left mid and lower lung fields. XR CHEST PORTABLE   Final Result   Persistent complete opacification of the left hemithorax. XR CHEST PORTABLE   Final Result   Interval complete opacification of the left hemithorax. CT CHEST WO CONTRAST   Final Result   1. Multifocal bronchopneumonia, with progression since 06/17/2022.   2. Small bilateral pleural effusions. 3. Narrowing of the AP diameter of the trachea. 4. Mild cardiomegaly. XR CHEST PORTABLE   Final Result   Airspace disease within the mid to lower lung zones is slightly increased as   compared to prior. Possible small left pleural effusion. CT ABDOMEN PELVIS WO CONTRAST Additional Contrast? None   Final Result   1. Nonspecific, nonobstructive gaseous bowel pattern. 2. Extensive diverticulosis of the left colon, lacking acute inflammatory   changes. 3. Bibasilar consolidations, atelectasis versus pneumonia.   Pleural Supportive tubing projects in stable position. Partial regression of atelectasis in the right lung base. Persisting   consolidation or or atelectasis in the left lung base. XR CHEST PORTABLE   Final Result   1. Endotracheal tube terminates in appropriate position above the nirali. 2.  Enteric tube terminates in the body of the stomach. 3.  Basilar opacities again demonstrated, right greater than left. Interval   improved aeration of the left lung base. XR CHEST PORTABLE   Final Result   Increasing opacity in the right base, lobar atelectasis versus accumulating   right pleural effusion. Combination of both entities or underlying pneumonia   also possible. Persisting left basilar airspace disease and small pleural effusion. XR CHEST PORTABLE   Final Result   Hazy bilateral lobe airspace opacities and small left pleural effusion. This   could represent atelectasis or pneumonia in the appropriate clinical setting. CT CHEST WO CONTRAST   Final Result   1. Respiratory motion in the lower lungs. The right base has linear and   dependent atelectasis. The left does have atelectatic changes but could also   have some patchy opacities from pneumonia or aspiration at the left base. 2.  There is no pleural effusion or pneumothorax. 3.  Narrowing of the AP diameter of the trachea with bulging of the   membranous portion due to the gas distended but nondilated esophagus. XR CHEST PORTABLE   Final Result   1. Bibasilar heterogeneous opacities may represent subsegmental atelectasis   and/or mild pulmonary edema. Underlying infection is difficult to exclude. Short-term follow-up PA and lateral chest radiographs may be helpful for   further evaluation. 2.  Cardiomegaly.          XR CHEST PORTABLE    (Results Pending)   XR CHEST PORTABLE    (Results Pending)           Assessment/Plan:    Principal Problem:    Septicemia ([address]  Active Problems: Aspiration pneumonia of left lower lobe (HCC)    Severe protein-calorie malnutrition (HCC)    Chronic anemia    Thrush    MRSA colonization    BETSY (obstructive sleep apnea)    Metabolic acidosis    Pulmonary congestion    Mucus plugging of bronchi    Atrial fibrillation, chronic (HCC)    Parkinson disease (HCC)    Chronic kidney disease    Acute kidney injury superimposed on CKD ([facility] [address]  Resolved Problems:    Shortness of breath    Acute respiratory failure with hypoxia (HCC)         Acute Hypox Resp Failure   Aspiration PNA   Treated with  IV Zosyn and vancomycin. Changed Zosyn to cefepime. DCd vancomycin 6/22. Finished 10 days of Vanco and cefepime  Intubated 6/18. Extubated on 6/27/2022.  -On BiPAP. Alternate BiPAP with Airvo. - SLP involved. - resp culture from BAL is growing candida - started diflucan on 7/1/2022   - spiking high fever 7/1 - back on Abx with Vanc and merrem IV.    - now on 4 L-> 5L o2 , weaning as able               - poor physical condition limiting clearance of resp secretions- sp repeat bronch with clearing of mucous on 7/7/22 . On inhaled Mucomyst     7/12  Transferred back to ICU for worsening respiratory status. Chest x-ray showed complete opacification left hemithorax. Placed on BiPAP. The plan is to intubate her and do a bronchoscopy. Currently not on any antibiotics   Intubated 7/12. Bronchoscopy with BAL cultures  Small left pleural effusion by bedside ultrasound    Sepsis POA - due to above. Recurrent with aspiration  Now off antibiotics  Currently on Levophed    Dysphagia. Abnormal CT  Cough with every meal intake per family report. Underlying Parkinson's. - continues to struggle with secretions. - CT abd pelvis 7/2 - actually fairly unremarkable.              Melena  GI bleed  - drop in H/H   -GI consulted  -H&H held stable.  -Has since resumed eliquis  - IV PPI BID      FARHEEN on CKD IIIB  -Likely secondary to ATN in the setting of pneumonia, hypotension and elevated vancomycin levels  -Had lasix gtt earlier this admission.  -Back on IV Lasix now, creatinine up to 1.8 today. Monitor BMP  Hold Lasix  Creatinine worsening. Nephrology consult  IV Lasix with IV albumin     Parkinson's disease   - on Sinemet. Afib Paroxysmal   -Rythmol   -Eliquis held     HTN   - BP on the low side. Nifedipine stopped due to this and also leg edema.    Consider alternative when restarting.     Generalized weakness and debility  -Consult PT OT     DVT Prophylaxis: heparin sc   eliquis held for bloody secretions seen during bronch and melena  Diet: Diet NPO  ADULT TUBE FEEDING; Orogastric; Peptide Based High Protein; Continuous; 20; Yes; 15; Q 4 hours; 35; 30; Q 3 hours; Protein; one proteinex P2Go once daily via feeding tube  Code Status: Full Code      Elsie Jameson MD 7/14/2022 7:54 AM

## 2022-07-14 NOTE — PROGRESS NOTES
07/13/22 2259   NICU Vent Information   Vent Type 980   Vent Mode AC/VC   Vt (Set, mL) 440 mL   Vt Exhaled 418 mL   Resp Rate (Set) 20 bmp   Rate Measured 20 br/min   Minute Volume 8.3 Liters   Peak Flow 60 L/min   FiO2  50 %   Peak Inspiratory Pressure 35 cmH2O   I:E Ratio 1:2.4   Sensitivity 3   PEEP/CPAP (cmH2O) 12   Mean Airway Pressure 19 cmH20   Plateau Pressure 32 INM31   Cough/Sputum   Sputum How Obtained Endotracheal;Suctioned   Cough Productive   Sputum Amount Small   Sputum Color Brown   Tenacity Thick   Breath Sounds   Right Upper Lobe Diminished   Right Middle Lobe Diminished   Right Lower Lobe Diminished   Left Upper Lobe Diminished   Left Lower Lobe Diminished   Additional Respiratory Assessments   Heart Rate 78   Resp 20   SpO2 96 %   Position Semi-Mccollum's   Humidification Source Heated wire   Humidification Temp 36   Circuit Condensation Drained   Vent Alarm Settings   High Pressure  40 cmH2O   Low Minute Volume Alarm 4 L/min   Vt Low  210 mL   Vt High  750 mL   RR High 40 br/min   Apnea (Secs) 20 secs   Ambu Bag With Mask At Bedside Yes   ETT (adult)   Placement Date/Time: 07/12/22 6325   Placed By: Licensed provider  Placement Verified By: Chest X-ray;Colorimetric ETCO2 device;Direct visualization  Preoxygenation: Yes  Mask Ventilation: Ventilated by mask (1)  Technique: Rapid sequence;Direct laryn. ..    Secured At 23 cm   Measured From Lips   ETT Placement Right  (moved to right)   Secured By Commercial tube hughes   Site Assessment Dry

## 2022-07-14 NOTE — PROGRESS NOTES
Pulmonary & Critical Care Medicine ICU Progress Note    CC: shortness of breath     Events of Last 24 hours:   Patient still on mechanical ventilation with PEEP of 10/Fio2 50 350/24/10/50   She is requiring mild dose of pressors    Chest x-ray still showing infiltrate bilaterally    Status post second bronchoscopy did not show much secretions    Bedside echo shows a effusion on the left side    Vascular lines: IV: PICC 6/19/2022 P      Intake/Output Summary (Last 24 hours) at 7/14/2022 0845  Last data filed at 7/14/2022 0631  Gross per 24 hour   Intake 2358.63 ml   Output 300 ml   Net 2058.63 ml         MV: 6/18/2022 -6/27/22  Vent Mode: AC/VC Resp Rate (Set): 20 bmp/Vt (Set, mL): 440 mL/ /FiO2 : 50 %  Recent Labs     07/12/22  0500 07/13/22  0450   PHART 7.508* 7.597*   MMW2FCL 50.5* 33.9*   PO2ART 55.6* 67.2*       IV:   dexmedetomidine HCl in NaCl 0.6 mcg/kg/hr (07/14/22 0746)    fentaNYL 75 mcg/hr (07/14/22 0617)    norepinephrine 1 mcg/min (07/14/22 0617)    sodium chloride      sodium chloride 5 mL/hr at 07/10/22 0747       Vitals:  Blood pressure 134/68, pulse 81, temperature 99.6 °F (37.6 °C), temperature source Bladder, resp. rate 20, height 5' 6\" (1.676 m), weight 253 lb 1.6 oz (114.8 kg), SpO2 95 %, not currently breastfeeding. On  5 L  Constitutional: Intubated and sedated  HENT:  Oropharynx is clear and moist.   Neck: No tracheal deviation present. Cardiovascular: Normal heart sounds. No lower extremity edema. Pulmonary/Chest: no breath sound left side   Musculoskeletal: No cyanosis. No clubbing. Skin: Skin is warm and dry. Psychiatric: Normal mood and affect.   Neurologic: Debated and sedated         Scheduled Meds:   heparin (porcine)  5,000 Units SubCUTAneous 3 times per day    albumin human  25 g IntraVENous Q6H    potassium bicarb-citric acid  20 mEq Oral Once    sodium bicarbonate  650 mg Oral Daily    acetylcysteine  600 mg Inhalation BID    ipratropium-albuterol  1 ampule Inhalation Q4H While awake    pantoprazole  40 mg IntraVENous BID    chlorhexidine  15 mL Mouth/Throat BID    sodium chloride flush  10 mL IntraVENous 2 times per day    atorvastatin  40 mg Oral Nightly    carbidopa-levodopa  1 tablet Oral 4x Daily    DULoxetine  60 mg Oral Daily    lamoTRIgine  50 mg Oral BID    montelukast  10 mg Oral Daily    propafenone  150 mg Oral 3 times per day       Data:  CBC:   Recent Labs     07/12/22  0354 07/13/22  0450 07/14/22  0540   WBC 11.2* 13.0* 10.2   HGB 9.9* 9.4* 7.5*   HCT 31.1* 29.0* 22.8*   MCV 87.1 84.9 85.3    339 240     BMP:   Recent Labs     07/12/22  0354 07/13/22  0450 07/14/22  0540    138 138   K 3.7 4.0 3.6   CL 95* 92* 95*   CO2 34* 31 28   PHOS 3.8 3.8 3.3   BUN 25* 31* 39*   CREATININE 1.8* 2.6* 2.9*     LIVER PROFILE:   No results for input(s): AST, ALT, LIPASE, BILIDIR, BILITOT, ALKPHOS in the last 72 hours. Invalid input(s): AMYLASE,  ALB    Microbiology:  6/17/2022 SARS-CoV-2 and influenza are negative  6/17/2022 MRSA DNA screen positive  6/17/2022 blood NG  6/17/2022 sputum NRF  6/18/2022 tracheal aspirate NRF  6/19/2022 BAL Candida  6/19/2022 respiratory viral panel negative  7/1/22 Blood cx NGTD  7/7/2022 bronchial washings and BAL Candida krusei      Imaging:  CT chest 7/3/22  images reviewed by me and showed:   1. Multifocal bronchopneumonia, with progression since 06/17/2022.   2. Small bilateral pleural effusions  3. Narrowing of the AP diameter of the trachea  4.  Mild cardiomegaly    ASSESSMENT:  · Acute hypoxemic respiratory failure, initially on vent, extubated but then returned to ICU and required BiPAP  · Aspiration pneumonia - recurrent with septic shock mucous plugging of bronchi on bronchoscopy 7/7/2022  · Tracheobronchomalacia on bronchoscopy 7/7/2022 -  perhaps limiting airway clearance  · BETSY on BiPAP 22/15 with 1 L bleed in   · Parkinson's disease  · Atrial fibrillation  · Acute on chronic kidney failure, baseline creatinine 1.6  · Acute on chronic anemia, s/p 2 U PRBC 6/23/2022  · H/O esophagitis  · Pleural effusions are small bilaterally  · Bloody sputum    PLAN:  Status post bronchoscopy twice, today bronchoscopy shows no significant secretion much better and clear airway  Watch Hb decrease urine out   worsen renal function ,discussed with renal,will do Lasix and albumin   Chest x-ray still showing not fully irrigated left lung  Long discussion will need trach as she will have this decompensation ,she can not clear secretions  S/p bronch ,large mucus plug was removed   Mucomyst 3 ML 20% inhalation BID  Metaneb  NT suctioning as needed   Chest vest   All abx managed by ID ,stopped   Fluconazole stopped   Watch Hb ,on tube feeding and she is at goal       discussed with  in detail        Care reviewed with nursing staff, medical and surgical specialty care, primary care and the patient's family as available. Chart review/lab review/X-ray viewing/documentation and had long Conversation with patient/family re: prognosis, care options and any end of life issues:      Critical care time spent reviewing labs/films, examining patient, collaborating with other physicians more than 35  Minutes  excluding procedures . Laura Donald M.D.   7/14/2022  8:45 AM

## 2022-07-14 NOTE — PROCEDURES
BROCHOSCOPY PROCEDURE NOTE    DATE OF PROCEDURE: 7/13 / 2022    INDICATIONS & HISTORY:  Left lower lobe collapse  PREOPERATIVE DIAGNOSIS:    same    POSTOPERATIVE DIAGNOSES:  Mild thick secretions       PROCEDURE PERFORMED:   1-Diagnotic, Fiberoptic Bronchoscopy                 SURGEON:    Ronan Wyatt     ASSISTANT:   Bronchoscopy Nursing/Technical Team/OR Team as available i    SEDATION:     Regional Anesthesia,       ANESTHESIA:    Lidocaine 2% ,       ANESTHESIOLOGIST:  Per EPIC Note      Description of Procedure: The patient  identified Neita Dark  and the procedure verified as Flexible Fiberoptic Bronchoscopy with removal of alrge mucus plug         After the patient was controlled with sedation bronchoscope was introduced through ET  without difficulty. The scope was then passed into the trachea. Additional 2% lidocaine was used topically within the airway. Careful inspection of the tracheal lumen was accomplished. The scope was sequentially passed into all bronchial airways. Endobronchial findings:     Trachea:  Normal mucosa, he the part seen outside the ET tube  Arabella  Normal mucosa     Right Main Stem Bronchus  Normal mucosa mild thick secretions   Right Upper Lobe Bronchi Normal mucosa  Right Middle Lobe Bronchi  Normal mucosa  Right Lower Lobe Bronchi (including the Superior segment)  Normal mucosa     Left Main Stem Bronchus some thick secretions   Left Upper Lobe Bronchus, Upper Division was obstructed with  mild secretions   Left Upper Lobe Bronchus, Lingula mild secretions  Left Lower Lobe Bronchus (including the Superior segment)   mild secretions         COMPLICATIONS:  Estimated blood loss less than 1 CC          RECOMMENDATIONS:   The Patient was in good condition ,monitor vitals   Await final test/sample results.         Paula Minaya MD

## 2022-07-14 NOTE — PROGRESS NOTES
Pt intubated and sedated. Intubated with # 7.5 at the # 23 LL. Vent settings are 20 / 440 / 50% / 10. Breath sounds are diminished through out, especially on the left lower lobe. Heart rate is SR on the bedside monitor with rates in the 80s. Generalized edema noted. Scheduled meds given per orders. OG at 63cm. TF running at goal rate of 35ml/hr. Pt tolerating well. RUE dual lumen PICC patent and working well. Fentanyl infusing. Precedex infusing. Levophed infusing. RASS -3  CPOT 0    Zimmerman patent and draining clear yellow urine.

## 2022-07-14 NOTE — PROGRESS NOTES
RT Inhaler-Nebulizer Bronchodilator Protocol Note    There is a bronchodilator order in the chart from a provider indicating to follow the RT Bronchodilator Protocol and there is an Initiate RT Inhaler-Nebulizer Bronchodilator Protocol order as well (see protocol at bottom of note). CXR Findings:  XR CHEST PORTABLE    Result Date: 7/13/2022  Interval worsening with increased pneumonia/consolidation left lung and mild-to-moderate congestion. XR CHEST PORTABLE    Result Date: 7/12/2022  Lines and tubes in satisfactory position with ill-defined opacifications in the left lung base and small to moderate size residual left pleural effusion. XR CHEST PORTABLE    Result Date: 7/12/2022  1. Endotracheal tube terminates in appropriate position above the nirali. The enteric tube courses off the field of view in the upper abdomen. 2. Improved aeration of the left upper lung field with persistent consolidation and/or effusion opacifying the left mid and lower lung fields. XR CHEST PORTABLE    Result Date: 7/12/2022  Persistent complete opacification of the left hemithorax. The findings from the last RT Protocol Assessment were as follows:   History Pulmonary Disease: Chronic pulmonary disease  Respiratory Pattern: Dyspnea on exertion or RR 21-25 bpm  Breath Sounds: Slightly diminished and/or crackles  Cough: Weak, productive  Indication for Bronchodilator Therapy: Mucolytic ordered  Bronchodilator Assessment Score: 8    Aerosolized bronchodilator medication orders have been revised according to the RT Inhaler-Nebulizer Bronchodilator Protocol below. Respiratory Therapist to perform RT Therapy Protocol Assessment initially then follow the protocol. Repeat RT Therapy Protocol Assessment PRN for score 0-3 or on second treatment, BID, and PRN for scores above 3.     No Indications - adjust the frequency to every 6 hours PRN wheezing or bronchospasm, if no treatments needed after 48 hours then discontinue using Per Protocol order mode. If indication present, adjust the RT bronchodilator orders based on the Bronchodilator Assessment Score as indicated below. Use Inhaler orders unless patient has one or more of the following: on home nebulizer, not able to hold breath for 10 seconds, is not alert and oriented, cannot activate and use MDI correctly, or respiratory rate 25 breaths per minute or more, then use the equivalent nebulizer order(s) with same Frequency and PRN reasons based on the score. If a patient is on this medication at home then do not decrease Frequency below that used at home. 0-3 - enter or revise RT bronchodilator order(s) to equivalent RT Bronchodilator order with Frequency of every 4 hours PRN for wheezing or increased work of breathing using Per Protocol order mode. 4-6 - enter or revise RT Bronchodilator order(s) to two equivalent RT bronchodilator orders with one order with BID Frequency and one order with Frequency of every 4 hours PRN wheezing or increased work of breathing using Per Protocol order mode. 7-10 - enter or revise RT Bronchodilator order(s) to two equivalent RT bronchodilator orders with one order with TID Frequency and one order with Frequency of every 4 hours PRN wheezing or increased work of breathing using Per Protocol order mode. 11-13 - enter or revise RT Bronchodilator order(s) to one equivalent RT bronchodilator order with QID Frequency and an Albuterol order with Frequency of every 4 hours PRN wheezing or increased work of breathing using Per Protocol order mode. Greater than 13 - enter or revise RT Bronchodilator order(s) to one equivalent RT bronchodilator order with every 4 hours Frequency and an Albuterol order with Frequency of every 2 hours PRN wheezing or increased work of breathing using Per Protocol order mode.          Electronically signed by Herberth Pearl RCP on 7/13/2022 at 8:13 PM

## 2022-07-14 NOTE — PROGRESS NOTES
07/14/22 0330   NICU Vent Information   $Ventilation $Subsequent Day   Vent Type 980   Vent Mode AC/VC   Vt (Set, mL) 440 mL   Vt Exhaled 419 mL   Resp Rate (Set) 20 bmp   Rate Measured 20 br/min   Minute Volume 8.4 Liters   Peak Flow 60 L/min   FiO2  50 %   Peak Inspiratory Pressure 37 cmH2O   I:E Ratio 1:2.8   Sensitivity 3   PEEP/CPAP (cmH2O) 12  (decreased to 10)   Mean Airway Pressure 19 cmH20   Plateau Pressure 30 GLB95   Cough/Sputum   Sputum How Obtained Endotracheal;Suctioned   Cough Productive   Sputum Amount Moderate   Sputum Color Dark red   Tenacity Thick   Breath Sounds   Right Upper Lobe Diminished   Right Middle Lobe Diminished   Right Lower Lobe Diminished   Left Upper Lobe Diminished   Left Lower Lobe Diminished   Additional Respiratory Assessments   Heart Rate 88   Resp 20   SpO2 95 %   Position Semi-Mccollum's   Humidification Source Heated wire   Humidification Temp 37   Circuit Condensation Drained   Vent Alarm Settings   High Pressure  40 cmH2O   Low Minute Volume Alarm 4 L/min   Vt Low  210 mL   Vt High  750 mL   RR High 40 br/min   Apnea (Secs) 20 secs   Ambu Bag With Mask At Bedside Yes   ETT (adult)   Placement Date/Time: 07/12/22 7728   Placed By: Licensed provider  Placement Verified By: Chest X-ray;Colorimetric ETCO2 device;Direct visualization  Preoxygenation: Yes  Mask Ventilation: Ventilated by mask (1)  Technique: Rapid sequence;Direct laryn. ..    Secured At 23 cm   Measured From Lips   ETT Placement Right   Secured By Commercial tube hughes   Site Assessment Dry

## 2022-07-14 NOTE — CONSULTS
Department of General Surgery Consult    PATIENT NAME: Ramana Khan OF BIRTH: 1940    ADMISSION DATE: 6/17/2022 12:50 AM      TODAY'S DATE: 7/14/2022    Reason for Consult: Jessica Deluna and PEG    Chief Complaint: N/A    Requesting Physician: Sg Spencer    HISTORY OF PRESENT ILLNESS:              The patient is a 80 y.o. female who presents with acute hypoxemic respiratory failure.   She is unable to be weaned from the ventilator and needs long-term access for feeding and ventilation    Past Medical History:        Diagnosis Date    Anemia, unspecified 2010    neg bone marrow    Asthma     Atrial fibrillation ([address]     Baker's cyst     Basal cell carcinoma of left cheek 3/12/2019    Blind left eye     Carotid artery stenoses     Carotid stenosis 2002    left    Cervical spinal cord compression ([address] 11/2010    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-sided sciatica 8/9/2016    CKD (chronic kidney disease) stage 3, GFR 30-59 ml/min ([facility])     Community acquired pneumonia of left lower lobe of lung 9/8/2018    CRI     Detached retina, left     Diverticulosis     DJD (degenerative joint disease)     Edema     chronic    Fatty liver     GERD (gastroesophageal reflux disease)     Hearing loss     Herniated lumbar intervertebral disc     Hx stage 2 sacral decubitus ulcer 4/13/2018    Hyperlipidemia     Hypertension     Hypothyroid 1/27/2015    Morbid obesity with BMI of 40.0-44.9, adult ([address] 2/10/2017    BETSY treated with BiPAP     Parkinson's disease ([address]     Partial symptomatic epilepsy with complex partial seizures, not intractable, without status epilepticus ([address] 1/24/2020    Restless leg syndrome     Restless legs syndrome     Rheumatic fever 1950    2x IN [place]    Sleep apnea     Tremor, essential 8/16/2010    Type 2 diabetes mellitus without complication (HCC)     Type II or unspecified type diabetes mellitus without mention of complication, not stated as uncontrolled        Past Surgical History:        Procedure Laterality Date    APPENDECTOMY      BRONCHOSCOPY N/A 7/7/2022    BRONCHOSCOPY ALVEOLAR LAVAGE performed by Nagi Chen MD at [facility] [address]  7/7/2022    BRONCHOSCOPY THERAPUTIC ASPIRATION INITIAL performed by Nagi Chen MD at [address]      left    CATARACT REMOVAL      CERVICAL FUSION  11/2010    CHOLECYSTECTOMY      COLONOSCOPY N/A 3/8/2020    COLONOSCOPY FLEXIBLE W/ DECOMPRESSION performed by Yudy Moreno MD at [address] [facility]      HERNIA REPAIR      HYSTERECTOMY (CERVIX STATUS UNKNOWN)      IR MIDLINE CATH  10/8/2021    IR MIDLINE CATH 10/8/2021 [facility]    JOINT REPLACEMENT Bilateral 2003    KNEE SURGERY      replacement x 2    OVARY REMOVAL      SHOULDER SURGERY      TONSILLECTOMY      TUBAL LIGATION      UPPER GASTROINTESTINAL ENDOSCOPY  03/17/2020    Esophagitis    UPPER GASTROINTESTINAL ENDOSCOPY N/A 3/17/2020    EGD performed by Aravind Mahan MD at [address] VITRECTOMY         Current Medications:   Current Facility-Administered Medications: sennosides-docusate sodium (SENOKOT-S) 8.6-50 MG tablet 1 tablet, 1 tablet, Oral, BID  furosemide (LASIX) injection 80 mg, 80 mg, IntraVENous, BID  albumin human 5 % IV solution 25 g, 25 g, IntraVENous, Q6H  dexmedetomidine (PRECEDEX) 400 mcg in sodium chloride 0.9 % 100 mL infusion, 0.1-1.5 mcg/kg/hr, IntraVENous, Continuous  fentaNYL (SUBLIMAZE) 1,000 mcg in sodium chloride 0.9% 100 mL infusion,  mcg/hr, IntraVENous, Continuous  heparin (porcine) injection 5,000 Units, 5,000 Units, SubCUTAneous, 3 times per day  potassium bicarb-citric acid (EFFER-K) effervescent tablet 20 mEq, 20 mEq, Oral, Once  midazolam (VERSED) injection 2 mg, 2 mg, IntraVENous, Q1H PRN  fentaNYL (SUBLIMAZE) injection 50 mcg, 50 mcg, IntraVENous, Q1H PRN  norepinephrine (LEVOPHED) 16 mg in dextrose 5 % 250 mL infusion, 1-100 mcg/min, IntraVENous, Continuous  sodium bicarbonate tablet 650 mg, 650 mg, Oral, Daily  potassium chloride 20 mEq/50 mL IVPB (Central Line), 20 mEq, IntraVENous, PRN  magnesium sulfate 1000 mg in dextrose 5% 100 mL IVPB, 1,000 mg, IntraVENous, PRN  sodium phosphate 20.73 mmol in sodium chloride 0.9 % 250 mL IVPB, 0.16 mmol/kg, IntraVENous, PRN **OR** sodium phosphate 41.43 mmol in sodium chloride 0.9 % 500 mL IVPB, 0.32 mmol/kg, IntraVENous, PRN  acetylcysteine (MUCOMYST) 20 % solution 600 mg, 600 mg, Inhalation, BID  ipratropium-albuterol (DUONEB) nebulizer solution 1 ampule, 1 ampule, Inhalation, Q4H While awake  morphine (PF) injection 1 mg, 1 mg, IntraVENous, Q4H PRN  pantoprazole (PROTONIX) injection 40 mg, 40 mg, IntraVENous, BID  carboxymethylcellulose PF (THERATEARS) 1 % ophthalmic gel 1 drop, 1 drop, Both Eyes, 4x Daily PRN  0.9 % sodium chloride infusion, 25 mL, IntraVENous, PRN  chlorhexidine (PERIDEX) 0.12 % solution 15 mL, 15 mL, Mouth/Throat, BID  sodium chloride flush 0.9 % injection 10 mL, 10 mL, IntraVENous, 2 times per day  sodium chloride flush 0.9 % injection 10 mL, 10 mL, IntraVENous, PRN  0.9 % sodium chloride infusion, , IntraVENous, PRN  promethazine (PHENERGAN) tablet 12.5 mg, 12.5 mg, Oral, Q6H PRN **OR** ondansetron (ZOFRAN) injection 4 mg, 4 mg, IntraVENous, Q6H PRN  acetaminophen (TYLENOL) tablet 650 mg, 650 mg, Oral, Q6H PRN **OR** acetaminophen (TYLENOL) suppository 650 mg, 650 mg, Rectal, Q6H PRN  atorvastatin (LIPITOR) tablet 40 mg, 40 mg, Oral, Nightly  carbidopa-levodopa (SINEMET)  MG per tablet 1 tablet, 1 tablet, Oral, 4x Daily  [Held by provider] DULoxetine (CYMBALTA) extended release capsule 60 mg, 60 mg, Oral, Daily  lamoTRIgine (LAMICTAL) tablet 50 mg, 50 mg, Oral, BID  montelukast (SINGULAIR) tablet 10 mg, 10 mg, Oral, Daily  propafenone (RYTHMOL) tablet 150 mg, 150 mg, Oral, 3 times per day  albuterol (PROVENTIL) nebulizer solution 2.5 mg, 2.5 mg, Nebulization, Q4H PRN  Prior to Admission medications    Medication Sig Start Date End Date Taking? Authorizing Provider   lidocaine (XYLOCAINE) 5 % ointment Apply topically every 3 hours as needed for Pain Apply topically as needed.    Yes Historical Provider, MD   cetirizine (ZYRTEC) 10 MG tablet TAKE 1/2 TABLET BY MOUTH EVERY DAY 6/20/22   Avril Gonzalez MD   montelukast (SINGULAIR) 10 MG tablet TAKE 1 TABLET EVERY DAY 5/26/22   MAHAD Salinas CNP   apixaban (ELIQUIS) 2.5 MG TABS tablet Take 1 tablet by mouth 2 times daily 5/24/22   Rubén Feliciano MD   valsartan (DIOVAN) 80 MG tablet Take 1 tablet by mouth daily 5/24/22   Rubén Feliciano MD   albuterol sulfate  (90 Base) MCG/ACT inhaler INHALE 2 PUFFS INTO THE LUNGS EVERY 6 HOURS AS NEEDED FOR WHEEZING 4/14/22   MAHAD Diego CNP   furosemide (LASIX) 40 MG tablet TAKE 1/2 TABLET EVERY DAY 3/2/22   Rubén Feliciano MD   NIFEdipine (PROCARDIA XL) 90 MG extended release tablet TAKE 1 TABLET EVERY DAY 3/2/22   MAHAD Gruber CNP   metoprolol tartrate (LOPRESSOR) 50 MG tablet TAKE 1 TABLET TWICE DAILY 3/2/22   MAHAD Salinas CNP   DULoxetine (CYMBALTA) 60 MG extended release capsule TAKE 1 CAPSULE EVERY DAY 3/2/22   MAHAD Salinas CNP   fluticasone-vilanterol (BREO ELLIPTA) 100-25 MCG/INH AEPB inhaler INHALE 1 PUFF EVERY DAY 12/22/21   MAHAD Diego CNP   atorvastatin (LIPITOR) 40 MG tablet TAKE 1 AND 1/2 TABLETS EVERY DAY 11/4/21   Rubén Feliciano MD   zinc oxide 16 % OINT ointment Apply topically 4 times daily as needed for Dry Skin  Patient not taking: Reported on 6/17/2022 11/3/21   Dene Jose R Pardekooper, APRN - CNP   propafenone (RYTHMOL) 150 MG tablet TAKE 1 TABLET BY MOUTH EVERY 8 HOURS 11/3/21   Marcus Manzo MD   ferrous sulfate (IRON 325) 325 (65 Fe) MG tablet Take 325 mg by mouth 2 times daily  2/4/21 12/2/21 [id number]]  Out: 153 [Urine:153]      CONSTITUTIONAL: Intubated, sedated and morbidly obese  EYES:  PERRL, sclera clear  ENT:  Normocephalic,atraumatic, without obvious abnormality  NECK:  supple, symmetrical, trachea midline  LUNGS: On vent  CARDIOVASCULAR:  NO JVD, regular rate and rhythm and no murmur noted  ABDOMEN:  normal bowel sounds, soft, obese  MUSCULOSKELETAL: No clubbing or cyanosis, 1+ pitting edema lower extremities  NEUROLOGIC:  Mental Status Exam:  Level of Alertness:   Sedated  PSYCHIATRIC:   Unable to obtain due to sedated status  SKIN:   Warm and dry    DATA:    CBC:   Recent Labs     07/12/22 0354 07/13/22 0450 07/14/22  0540   WBC 11.2* 13.0* 10.2   HGB 9.9* 9.4* 7.5*   HCT 31.1* 29.0* 22.8*    339 240     BMP:    Recent Labs     07/12/22 0354 07/13/22 0450 07/14/22  0540    138 138   K 3.7 4.0 3.6   CL 95* 92* 95*   CO2 34* 31 28   BUN 25* 31* 39*   CREATININE 1.8* 2.6* 2.9*   GLUCOSE 92 107* 130*     Hepatic: No results for input(s): AST, ALT, ALB, BILITOT, ALKPHOS in the last 72 hours. Mag:      Recent Labs     07/12/22 0354 07/13/22 0450 07/14/22  0540   MG 1.70* 2.30 2.00      Phos:     Recent Labs     07/12/22 0354 07/13/22 0450 07/14/22  0540   PHOS 3.8 3.8 3.3      INR: No results for input(s): INR in the last 72 hours. Radiology Review: Images personally reviewed by me. CT abdomen images reviewed and show prominent stomach in the upper midline well below the costal margin      IMPRESSION/RECOMMENDATIONS:    Acute respiratory failure and ventilator dependence and need of trach and PEG. Based on imaging she will be a good candidate for PEG tube placement. I called her  and explained the procedure including the risks, benefits, and alternatives.   He understands and is agreeable    Electronically signed by Baylee Rivas MD     [address] [phone]

## 2022-07-14 NOTE — PROGRESS NOTES
[facility] Progress Note           Subjective/   80y.o. year old female who we are seeing in consultation for FARHEEN/CKD. HPI:    She remains in the ICU in critical condition. Kidney function worsening. Swelling worsening. On vent with FiO2 of 50%    ROS: No fever or chills. + edema.    Social:   at bedside     Objective/   GEN:  Chronically ill, BP (!) 132/56   Pulse 78   Temp 99.6 °F (37.6 °C) (Bladder)   Resp 24   Ht 5' 6\" (1.676 m)   Wt 253 lb 1.6 oz (114.8 kg)   SpO2 96%   BMI 40.85 kg/m²    From 7/4  HEENT: non-icteric, atraumatic  CV: S1, S2 without r/g; + LE edema  RESP: On vent, no wheezing   ABD: +bs, soft, nt, no hsm  SKIN: warm, no rashes    Data/  Recent Labs     07/12/22 0354 07/13/22  0450 07/14/22  0540   WBC 11.2* 13.0* 10.2   HGB 9.9* 9.4* 7.5*   HCT 31.1* 29.0* 22.8*   MCV 87.1 84.9 85.3    339 240     Recent Labs     07/12/22 0354 07/13/22  0450 07/14/22  0540    138 138   K 3.7 4.0 3.6   CL 95* 92* 95*   CO2 34* 31 28   GLUCOSE 92 107* 130*   PHOS 3.8 3.8 3.3   MG 1.70* 2.30 2.00   BUN 25* 31* 39*   CREATININE 1.8* 2.6* 2.9*   LABGLOM 27* 18* 16*   GFRAA 33* 21* 19*     Component      Latest Ref Rng & Units 6/22/2022 6/22/2022           4:10 PM  4:10 PM   Color, UA      Straw/Yellow Yellow    Clarity, UA      Clear Clear    Glucose, UA      Negative mg/dL Negative    Bilirubin, Urine      Negative Negative    Ketones, Urine      Negative mg/dL TRACE (A)    Specific Gravity, UA      1.005 - 1.030 1.025    Blood, Urine      Negative Negative    pH, UA      5.0 - 8.0 5.5    Protein, UA      Negative mg/dL TRACE (A)    Urobilinogen, Urine      <2.0 E.U./dL 0.2    Nitrite, Urine      Negative Negative    Leukocyte Esterase, Urine      Negative Negative    Microscopic Examination       YES    Urine Type       NotGiven    Mucus, UA      None Seen /LPF Rare (A)    WBC, UA      0 - 5 /HPF 10-20 (A)    RBC, UA      0 - 4 /HPF 3-4    Epithelial Cells, UA      0 - 5 /HPF 0-1    Bacteria, UA      None Seen /HPF 2+ (A)    Sodium, Ur      Not Established mmol/L  <20   Potassium, Ur      Not Established mmol/L  26.3   Chloride      Not Established mmol/L  <20       Assessment/     - Acute kidney injury - multi-factorial ATN injury in setting of aspiration PNA, hypotension   Scr is up to  2.9 / Volume overloaded     - Chronic kidney disease stage 3b - background DM/HTN/CHF              SCr 1.6              Follows with [facility]     - Acute hypoxemic respiratory failure/Aspiration PNA/Pulmonary edema    On vent, improved FiO2 requirements over the last 24 hours   S/p bronch      - Diastolic CHF     - Atrial fibrillation    - Anemia - prn prbc's     - Hypertension   Range is now low but stable    - Hypokalemia, improved. -Metabolic acidosis, resolved    -Dysphagia, patient has been started on some puréed food    Plan/     IV lasix   IV albumin   Follow labs  Hemodynamic support   Monitor in/outs     ____________________________________  Deysi Osuna MD  [facility]  [email or web address]  Office: [phone]

## 2022-07-14 NOTE — PROGRESS NOTES
Rounds completed with Dr. Lazaro Sanders at this time. New orders for Gen surg consults for trach/peg, repeat pro-titus, check stool occult, start senokot, and repeat H/H at 1800.

## 2022-07-14 NOTE — CARE COORDINATION
INTERDISCIPLINARY PLAN OF CARE CONFERENCE    Date/Time: 7/14/2022 9:14 AM  Completed by: Nolberto RODRIGUEZ, JENNIFER  Case Management      Patient Name:  Gaviota Layton  YOB: 1940  Admitting Diagnosis: Shortness of breath [R06.02]  Septicemia ([facility] [A41.9]  Chronic anemia [D64.9]  Acute respiratory failure with hypoxia ([facility] [J96.01]  Aspiration pneumonia of left lower lobe, unspecified aspiration pneumonia type ([facility] [J69.0]  Chronic kidney disease, unspecified CKD stage [N18.9]     Admit Date/Time:  6/17/2022 12:50 AM    Chart reviewed. Interdisciplinary team contacted or reviewed plan related to patient progress and discharge plans. Disciplines included Case Management, Nursing, and Dietitian. Current Status:ongoing   PT/OT recommendation for discharge plan of care: n/a    Expected D/C Disposition:  TBD    Discharge Plan Comments: Chart review completed. Pt remains in the ICU on a Vent. Pt has been accepted at [facility]. Palliative Care is following. CM will follow. Please notify CM if needs or concerns arise     Home O2 in place on admit: Yes    Addendum at 10:34am: Spoke with Bill Jackson at [facility] who is aware that pending note from Dr. Jim Duffy indicates Lethea Maser will likely be needed and pt remains on a Vent.  She stated they could possibly move pt with a Vent if approved by MD

## 2022-07-15 ENCOUNTER — ANESTHESIA (OUTPATIENT)
Dept: OPERATING ROOM | Age: 82
DRG: 004 | End: 2022-07-15
Payer: MEDICARE

## 2022-07-15 ENCOUNTER — APPOINTMENT (OUTPATIENT)
Dept: GENERAL RADIOLOGY | Age: 82
DRG: 004 | End: 2022-07-15
Payer: MEDICARE

## 2022-07-15 LAB
ABO/RH: NORMAL
ANION GAP SERPL CALCULATED.3IONS-SCNC: 12 MMOL/L (ref 3–16)
ANISOCYTOSIS: ABNORMAL
ANTIBODY SCREEN: NORMAL
BASE EXCESS ARTERIAL: 7.2 MMOL/L (ref -3–3)
BASOPHILS ABSOLUTE: 0 K/UL (ref 0–0.2)
BASOPHILS RELATIVE PERCENT: 0 %
BLOOD BANK DISPENSE STATUS: NORMAL
BLOOD BANK PRODUCT CODE: NORMAL
BPU ID: NORMAL
BUN BLDV-MCNC: 41 MG/DL (ref 7–20)
CALCIUM SERPL-MCNC: 9 MG/DL (ref 8.3–10.6)
CARBOXYHEMOGLOBIN ARTERIAL: 0.3 % (ref 0–1.5)
CHLORIDE BLD-SCNC: 93 MMOL/L (ref 99–110)
CO2: 30 MMOL/L (ref 21–32)
CREAT SERPL-MCNC: 3.1 MG/DL (ref 0.6–1.2)
DESCRIPTION BLOOD BANK: NORMAL
EOSINOPHILS ABSOLUTE: 0.4 K/UL (ref 0–0.6)
EOSINOPHILS RELATIVE PERCENT: 4 %
GFR AFRICAN AMERICAN: 17
GFR NON-AFRICAN AMERICAN: 14
GLUCOSE BLD-MCNC: 103 MG/DL (ref 70–99)
GLUCOSE BLD-MCNC: 104 MG/DL (ref 70–99)
GLUCOSE BLD-MCNC: 110 MG/DL (ref 70–99)
GLUCOSE BLD-MCNC: 122 MG/DL (ref 70–99)
HCO3 ARTERIAL: 30.5 MMOL/L (ref 21–29)
HCT VFR BLD CALC: 20.2 % (ref 36–48)
HCT VFR BLD CALC: 23.8 % (ref 36–48)
HEMOGLOBIN, ART, EXTENDED: 8.2 G/DL (ref 12–16)
HEMOGLOBIN: 6.8 G/DL (ref 12–16)
HEMOGLOBIN: 7.7 G/DL (ref 12–16)
HYPOCHROMIA: ABNORMAL
LYMPHOCYTES ABSOLUTE: 0.7 K/UL (ref 1–5.1)
LYMPHOCYTES RELATIVE PERCENT: 7 %
MAGNESIUM: 1.8 MG/DL (ref 1.8–2.4)
MCH RBC QN AUTO: 28.9 PG (ref 26–34)
MCHC RBC AUTO-ENTMCNC: 33.7 G/DL (ref 31–36)
MCV RBC AUTO: 85.7 FL (ref 80–100)
METHEMOGLOBIN ARTERIAL: 0.3 %
MICROCYTES: ABNORMAL
MONOCYTES ABSOLUTE: 0.2 K/UL (ref 0–1.3)
MONOCYTES RELATIVE PERCENT: 2 %
NEUTROPHILS ABSOLUTE: 8.7 K/UL (ref 1.7–7.7)
NEUTROPHILS RELATIVE PERCENT: 87 %
O2 SAT, ARTERIAL: 97.3 %
O2 THERAPY: ABNORMAL
PCO2 ARTERIAL: 37.8 MMHG (ref 35–45)
PDW BLD-RTO: 13.6 % (ref 12.4–15.4)
PERFORMED ON: ABNORMAL
PH ARTERIAL: 7.53 (ref 7.35–7.45)
PHOSPHORUS: 3.1 MG/DL (ref 2.5–4.9)
PLATELET # BLD: 195 K/UL (ref 135–450)
PLATELET SLIDE REVIEW: ADEQUATE
PMV BLD AUTO: 8.8 FL (ref 5–10.5)
PO2 ARTERIAL: 85.1 MMHG (ref 75–108)
POLYCHROMASIA: ABNORMAL
POTASSIUM SERPL-SCNC: 4 MMOL/L (ref 3.5–5.1)
PROCALCITONIN: 0.69 NG/ML (ref 0–0.15)
RBC # BLD: 2.36 M/UL (ref 4–5.2)
SCHISTOCYTES: ABNORMAL
SLIDE REVIEW: ABNORMAL
SODIUM BLD-SCNC: 135 MMOL/L (ref 136–145)
TCO2 ARTERIAL: 31.7 MMOL/L
WBC # BLD: 10 K/UL (ref 4–11)

## 2022-07-15 PROCEDURE — 2580000003 HC RX 258: Performed by: INTERNAL MEDICINE

## 2022-07-15 PROCEDURE — 86850 RBC ANTIBODY SCREEN: CPT

## 2022-07-15 PROCEDURE — 3600000014 HC SURGERY LEVEL 4 ADDTL 15MIN: Performed by: SURGERY

## 2022-07-15 PROCEDURE — 3700000001 HC ADD 15 MINUTES (ANESTHESIA): Performed by: SURGERY

## 2022-07-15 PROCEDURE — 43246 EGD PLACE GASTROSTOMY TUBE: CPT | Performed by: SURGERY

## 2022-07-15 PROCEDURE — 6360000002 HC RX W HCPCS: Performed by: INTERNAL MEDICINE

## 2022-07-15 PROCEDURE — P9016 RBC LEUKOCYTES REDUCED: HCPCS

## 2022-07-15 PROCEDURE — 84145 PROCALCITONIN (PCT): CPT

## 2022-07-15 PROCEDURE — 85014 HEMATOCRIT: CPT

## 2022-07-15 PROCEDURE — 6370000000 HC RX 637 (ALT 250 FOR IP): Performed by: INTERNAL MEDICINE

## 2022-07-15 PROCEDURE — C9113 INJ PANTOPRAZOLE SODIUM, VIA: HCPCS | Performed by: INTERNAL MEDICINE

## 2022-07-15 PROCEDURE — 31600 PLANNED TRACHEOSTOMY: CPT | Performed by: SURGERY

## 2022-07-15 PROCEDURE — 2000000000 HC ICU R&B

## 2022-07-15 PROCEDURE — 99233 SBSQ HOSP IP/OBS HIGH 50: CPT | Performed by: INTERNAL MEDICINE

## 2022-07-15 PROCEDURE — 6360000002 HC RX W HCPCS

## 2022-07-15 PROCEDURE — 86900 BLOOD TYPING SEROLOGIC ABO: CPT

## 2022-07-15 PROCEDURE — 2709999900 HC NON-CHARGEABLE SUPPLY: Performed by: SURGERY

## 2022-07-15 PROCEDURE — 83735 ASSAY OF MAGNESIUM: CPT

## 2022-07-15 PROCEDURE — 36592 COLLECT BLOOD FROM PICC: CPT

## 2022-07-15 PROCEDURE — 2700000000 HC OXYGEN THERAPY PER DAY

## 2022-07-15 PROCEDURE — 71045 X-RAY EXAM CHEST 1 VIEW: CPT

## 2022-07-15 PROCEDURE — 94003 VENT MGMT INPAT SUBQ DAY: CPT

## 2022-07-15 PROCEDURE — 3600000004 HC SURGERY LEVEL 4 BASE: Performed by: SURGERY

## 2022-07-15 PROCEDURE — 94640 AIRWAY INHALATION TREATMENT: CPT

## 2022-07-15 PROCEDURE — 36415 COLL VENOUS BLD VENIPUNCTURE: CPT

## 2022-07-15 PROCEDURE — 80048 BASIC METABOLIC PNL TOTAL CA: CPT

## 2022-07-15 PROCEDURE — 86923 COMPATIBILITY TEST ELECTRIC: CPT

## 2022-07-15 PROCEDURE — 99291 CRITICAL CARE FIRST HOUR: CPT | Performed by: INTERNAL MEDICINE

## 2022-07-15 PROCEDURE — 36430 TRANSFUSION BLD/BLD COMPNT: CPT

## 2022-07-15 PROCEDURE — 85018 HEMOGLOBIN: CPT

## 2022-07-15 PROCEDURE — 85025 COMPLETE CBC W/AUTO DIFF WBC: CPT

## 2022-07-15 PROCEDURE — 0B110F4 BYPASS TRACHEA TO CUTANEOUS WITH TRACHEOSTOMY DEVICE, OPEN APPROACH: ICD-10-PCS | Performed by: SURGERY

## 2022-07-15 PROCEDURE — 2500000003 HC RX 250 WO HCPCS

## 2022-07-15 PROCEDURE — 84100 ASSAY OF PHOSPHORUS: CPT

## 2022-07-15 PROCEDURE — 94669 MECHANICAL CHEST WALL OSCILL: CPT

## 2022-07-15 PROCEDURE — 82803 BLOOD GASES ANY COMBINATION: CPT

## 2022-07-15 PROCEDURE — 0DH63UZ INSERTION OF FEEDING DEVICE INTO STOMACH, PERCUTANEOUS APPROACH: ICD-10-PCS | Performed by: SURGERY

## 2022-07-15 PROCEDURE — 2500000003 HC RX 250 WO HCPCS: Performed by: INTERNAL MEDICINE

## 2022-07-15 PROCEDURE — 3700000000 HC ANESTHESIA ATTENDED CARE: Performed by: SURGERY

## 2022-07-15 PROCEDURE — 2720000010 HC SURG SUPPLY STERILE: Performed by: SURGERY

## 2022-07-15 PROCEDURE — 94761 N-INVAS EAR/PLS OXIMETRY MLT: CPT

## 2022-07-15 PROCEDURE — 86901 BLOOD TYPING SEROLOGIC RH(D): CPT

## 2022-07-15 PROCEDURE — P9045 ALBUMIN (HUMAN), 5%, 250 ML: HCPCS | Performed by: INTERNAL MEDICINE

## 2022-07-15 RX ORDER — SODIUM CHLORIDE 9 MG/ML
INJECTION, SOLUTION INTRAVENOUS PRN
Status: DISCONTINUED | OUTPATIENT
Start: 2022-07-15 | End: 2022-07-19 | Stop reason: HOSPADM

## 2022-07-15 RX ORDER — ONDANSETRON 2 MG/ML
INJECTION INTRAMUSCULAR; INTRAVENOUS PRN
Status: DISCONTINUED | OUTPATIENT
Start: 2022-07-15 | End: 2022-07-15 | Stop reason: SDUPTHER

## 2022-07-15 RX ORDER — CEFAZOLIN SODIUM 2 G/50ML
SOLUTION INTRAVENOUS PRN
Status: DISCONTINUED | OUTPATIENT
Start: 2022-07-15 | End: 2022-07-15 | Stop reason: SDUPTHER

## 2022-07-15 RX ORDER — PROPOFOL 10 MG/ML
INJECTION, EMULSION INTRAVENOUS PRN
Status: DISCONTINUED | OUTPATIENT
Start: 2022-07-15 | End: 2022-07-15 | Stop reason: SDUPTHER

## 2022-07-15 RX ORDER — ROCURONIUM BROMIDE 10 MG/ML
INJECTION, SOLUTION INTRAVENOUS PRN
Status: DISCONTINUED | OUTPATIENT
Start: 2022-07-15 | End: 2022-07-15 | Stop reason: SDUPTHER

## 2022-07-15 RX ADMIN — IPRATROPIUM BROMIDE AND ALBUTEROL SULFATE 1 AMPULE: 2.5; .5 SOLUTION RESPIRATORY (INHALATION) at 07:43

## 2022-07-15 RX ADMIN — Medication 100 MCG/HR: at 10:26

## 2022-07-15 RX ADMIN — PANTOPRAZOLE SODIUM 40 MG: 40 INJECTION, POWDER, LYOPHILIZED, FOR SOLUTION INTRAVENOUS at 08:18

## 2022-07-15 RX ADMIN — LAMOTRIGINE 50 MG: 25 TABLET ORAL at 21:09

## 2022-07-15 RX ADMIN — IPRATROPIUM BROMIDE AND ALBUTEROL SULFATE 1 AMPULE: 2.5; .5 SOLUTION RESPIRATORY (INHALATION) at 23:06

## 2022-07-15 RX ADMIN — PROPAFENONE HYDROCHLORIDE 150 MG: 150 TABLET, FILM COATED ORAL at 21:09

## 2022-07-15 RX ADMIN — CEFAZOLIN SODIUM 2000 MG: 2 SOLUTION INTRAVENOUS at 11:00

## 2022-07-15 RX ADMIN — ATORVASTATIN CALCIUM 40 MG: 40 TABLET, FILM COATED ORAL at 21:05

## 2022-07-15 RX ADMIN — IPRATROPIUM BROMIDE AND ALBUTEROL SULFATE 1 AMPULE: 2.5; .5 SOLUTION RESPIRATORY (INHALATION) at 19:31

## 2022-07-15 RX ADMIN — ACETYLCYSTEINE 600 MG: 200 INHALANT RESPIRATORY (INHALATION) at 07:43

## 2022-07-15 RX ADMIN — CARBIDOPA AND LEVODOPA 1 TABLET: 25; 100 TABLET ORAL at 21:04

## 2022-07-15 RX ADMIN — ACETYLCYSTEINE 600 MG: 200 INHALANT RESPIRATORY (INHALATION) at 19:31

## 2022-07-15 RX ADMIN — SENNOSIDES AND DOCUSATE SODIUM 1 TABLET: 50; 8.6 TABLET ORAL at 21:04

## 2022-07-15 RX ADMIN — ROCURONIUM BROMIDE 50 MG: 10 INJECTION, SOLUTION INTRAVENOUS at 11:04

## 2022-07-15 RX ADMIN — SODIUM CHLORIDE, PRESERVATIVE FREE 10 ML: 5 INJECTION INTRAVENOUS at 21:01

## 2022-07-15 RX ADMIN — Medication 0.6 MCG/KG/HR: at 12:35

## 2022-07-15 RX ADMIN — PROPOFOL 50 MG: 10 INJECTION, EMULSION INTRAVENOUS at 11:09

## 2022-07-15 RX ADMIN — PANTOPRAZOLE SODIUM 40 MG: 40 INJECTION, POWDER, LYOPHILIZED, FOR SOLUTION INTRAVENOUS at 20:59

## 2022-07-15 RX ADMIN — Medication 15 ML: at 21:05

## 2022-07-15 RX ADMIN — IPRATROPIUM BROMIDE AND ALBUTEROL SULFATE 1 AMPULE: 2.5; .5 SOLUTION RESPIRATORY (INHALATION) at 15:23

## 2022-07-15 RX ADMIN — ALBUMIN (HUMAN) 25 G: 12.5 INJECTION, SOLUTION INTRAVENOUS at 04:18

## 2022-07-15 RX ADMIN — PROPOFOL 100 MG: 10 INJECTION, EMULSION INTRAVENOUS at 11:04

## 2022-07-15 RX ADMIN — ONDANSETRON HYDROCHLORIDE 4 MG: 2 INJECTION, SOLUTION INTRAMUSCULAR; INTRAVENOUS at 11:36

## 2022-07-15 RX ADMIN — SODIUM CHLORIDE, PRESERVATIVE FREE 10 ML: 5 INJECTION INTRAVENOUS at 08:18

## 2022-07-15 RX ADMIN — PROPAFENONE HYDROCHLORIDE 150 MG: 150 TABLET, FILM COATED ORAL at 06:11

## 2022-07-15 RX ADMIN — ACETAMINOPHEN 325MG 650 MG: 325 TABLET ORAL at 16:03

## 2022-07-15 RX ADMIN — Medication 0.6 MCG/KG/HR: at 06:09

## 2022-07-15 ASSESSMENT — PULMONARY FUNCTION TESTS
PIF_VALUE: 31
PIF_VALUE: 32
PIF_VALUE: 34
PIF_VALUE: 33
PIF_VALUE: 34
PIF_VALUE: 32
PIF_VALUE: 31
PIF_VALUE: 35
PIF_VALUE: 30
PIF_VALUE: 31
PIF_VALUE: 32
PIF_VALUE: 32
PIF_VALUE: 36

## 2022-07-15 ASSESSMENT — ENCOUNTER SYMPTOMS: SHORTNESS OF BREATH: 1

## 2022-07-15 ASSESSMENT — LIFESTYLE VARIABLES: SMOKING_STATUS: 0

## 2022-07-15 NOTE — OP NOTE
[address]                 [address] [address]                                OPERATIVE REPORT    PATIENT NAME: Denise Stern                    :        1940  MED REC NO:   [id number]                          ROOM:       [id number]  ACCOUNT NO:   [de-identified]                           ADMIT DATE: 2022  PROVIDER:     Joy Rogers. MD Ebony    DATE OF PROCEDURE:  07/15/2022    PREOPERATIVE DIAGNOSIS:  Respiratory failure and ventilator dependence. POSTOPERATIVE DIAGNOSIS:  Respiratory failure and ventilator dependence. PROCEDURE:  EGD with PEG tube placement and tracheostomy tube insertion. ANESTHESIA:  General.    SURGEON:  Joy Rogers. MD Ebony    ESTIMATED BLOOD LOSS:  Less than 50 mL. INDICATIONS:  The patient is 30-year-old woman with respiratory failure  and ventilator dependence. She has long-term access for ventilation and  feeding. OPERATIVE SUMMARY:  After preoperative evaluation, the patient was  brought into the operating suite and left in a comfortable supine  position in her ICU bed. Monitoring equipment was attached and general  anesthesia was induced. The Olympus flexible endoscope was inserted  into the oropharynx and passed down the esophagus under direct  visualization. The stomach was visualized and the scope was retroflexed  to evaluate the GE junction. The proximal duodenum was identified as  well. She was found to have some antral gastritis. Otherwise, scope  was unremarkable. The abdomen was palpated and we identified a site  where minimal palpation externally promoted broad excursions of the  stomach. This was in the site that I had identified preoperatively as a  likely good source for PEG tube placement based on reviewing CT scan  images. Therefore, this area was marked and sterilely prepped and  draped and anesthetized with local anesthetic. A small incision was  made.   The needle and catheter were passed through into the stomach and  the catheter was grasped internally with a snare. The needle was  removed and a wire was passed through the catheter. The catheter was  grasped internally with a snare and pulled out through the stomach, out  into the oropharynx and out the mouth. It was secured to the PEG tube  externally and then the PEG tube was brought back down through the  patient's pharynx, esophagus and stomach and out the anterior abdominal  wall. It was gently pulled up and it was in good position at about 2 to  2.5 cm. A dry dressing was applied. We then placed a shoulder roll  behind her shoulders to extend her neck and her neck was sterilely  prepped and draped. A small transverse incision was made just superior  to the sternal notch. This was carried down through the subcutaneous  tissues. The strap muscles were split in the midline and the trachea  was identified. A portion of the second tracheal ring was excised and  sutures of 3-0 Prolene were placed on the cut edge of the tracheal ring  on either side for traction, should the tube come out. The tracheotomy  site was dilated and the endotracheal tube was removed. A #8 Shiley  cuffed tracheostomy tube was inserted and the balloon was inflated. This gave us good ventilation. The incision was closed around the tube  with interrupted figure-of-eight sutures of 2-0 silk. The bolster was  secured to the neck with 2-0 silk sutures as well. Tracheostomy tape  and trach dressing were applied. The stay sutures were secured to the  skin. All sponge, needle, and instrument counts were correct at the end  of the case. The patient tolerated the procedure well and was stable  throughout. She was taken back to the ICU in critical condition.         Sharyle Primrose, MD    D: 07/15/2022 12:25:32       T: 07/15/2022 12:28:58     [id number]  Job#: [id number]     Doc#: [id number]    CC:  _____ _____

## 2022-07-15 NOTE — PROGRESS NOTES
07/14/22 2300   RT Protocol   History Pulmonary Disease 2   Respiratory pattern 2   Breath sounds 2   Cough 4   Indications for Bronchodilator Therapy Mucolytic ordered   Bronchodilator Assessment Score 10   RT Inhaler-Nebulizer Bronchodilator Protocol Note    There is a bronchodilator order in the chart from a provider indicating to follow the RT Bronchodilator Protocol and there is an Initiate RT Inhaler-Nebulizer Bronchodilator Protocol order as well (see protocol at bottom of note). CXR Findings:  XR CHEST PORTABLE    Result Date: 7/14/2022  Bilateral pleuroparenchymal disease, increased on the right     XR CHEST PORTABLE    Result Date: 7/13/2022  Interval worsening with increased pneumonia/consolidation left lung and mild-to-moderate congestion. The findings from the last RT Protocol Assessment were as follows:   History Pulmonary Disease: Chronic pulmonary disease  Respiratory Pattern: Dyspnea on exertion or RR 21-25 bpm  Breath Sounds: Slightly diminished and/or crackles  Cough: Unable to generate effective cough  Indication for Bronchodilator Therapy: Mucolytic ordered  Bronchodilator Assessment Score: 10    Aerosolized bronchodilator medication orders have been revised according to the RT Inhaler-Nebulizer Bronchodilator Protocol below. Respiratory Therapist to perform RT Therapy Protocol Assessment initially then follow the protocol. Repeat RT Therapy Protocol Assessment PRN for score 0-3 or on second treatment, BID, and PRN for scores above 3. No Indications - adjust the frequency to every 6 hours PRN wheezing or bronchospasm, if no treatments needed after 48 hours then discontinue using Per Protocol order mode. If indication present, adjust the RT bronchodilator orders based on the Bronchodilator Assessment Score as indicated below.   Use Inhaler orders unless patient has one or more of the following: on home nebulizer, not able to hold breath for 10 seconds, is not alert and oriented, cannot activate and use MDI correctly, or respiratory rate 25 breaths per minute or more, then use the equivalent nebulizer order(s) with same Frequency and PRN reasons based on the score. If a patient is on this medication at home then do not decrease Frequency below that used at home. 0-3 - enter or revise RT bronchodilator order(s) to equivalent RT Bronchodilator order with Frequency of every 4 hours PRN for wheezing or increased work of breathing using Per Protocol order mode. 4-6 - enter or revise RT Bronchodilator order(s) to two equivalent RT bronchodilator orders with one order with BID Frequency and one order with Frequency of every 4 hours PRN wheezing or increased work of breathing using Per Protocol order mode. 7-10 - enter or revise RT Bronchodilator order(s) to two equivalent RT bronchodilator orders with one order with TID Frequency and one order with Frequency of every 4 hours PRN wheezing or increased work of breathing using Per Protocol order mode. 11-13 - enter or revise RT Bronchodilator order(s) to one equivalent RT bronchodilator order with QID Frequency and an Albuterol order with Frequency of every 4 hours PRN wheezing or increased work of breathing using Per Protocol order mode. Greater than 13 - enter or revise RT Bronchodilator order(s) to one equivalent RT bronchodilator order with every 4 hours Frequency and an Albuterol order with Frequency of every 2 hours PRN wheezing or increased work of breathing using Per Protocol order mode.      lElectronically signed by Dani Camarena RCP on 7/14/2022 at 11:37 PM

## 2022-07-15 NOTE — PROGRESS NOTES
07/15/22 1932   NICU Vent Information   Vent Type 980   Vent Mode AC/VC   Vt (Set, mL) 355 mL   Vt Exhaled 354 mL   Resp Rate (Set) 24 bmp   Rate Measured 24 br/min   Minute Volume 8.48 Liters   Peak Flow 60 L/min   Pressure Support 0 cmH20   FiO2  50 %   Peak Inspiratory Pressure 31 cmH2O   I:E Ratio 1:2.90   Sensitivity 3   PEEP/CPAP (cmH2O) 8   Mean Airway Pressure 14 cmH20   Plateau Pressure 27 KAK22   Static Compliance 18 mL/cmH2O   Dynamic Compliance 14 mL/cmH2O   Cough/Sputum   Sputum How Obtained Tracheal   Cough Productive   Sputum Amount Small   Sputum Color Red   Tenacity Thick   Breath Sounds   Right Upper Lobe Diminished   Right Middle Lobe Diminished   Right Lower Lobe Diminished   Left Upper Lobe Diminished   Left Lower Lobe Diminished   Additional Respiratory Assessments   Heart Rate 82   Resp 24   SpO2 96 %   Position Semi-Mccollum's   Humidification Source Heated wire   Humidification Temp 36.9   Circuit Condensation Drained   Vent Alarm Settings   High Pressure  45 cmH2O   Low Minute Volume Alarm 4 L/min   Low Exhaled Vt  210 mL   RR High 40 br/min   Apnea (Secs) 20 secs   Ambu Bag With Mask At Bedside Yes   Backup Trachs Available (Size) 6.0;8.0   Patient Observation   Observations han 8 dct

## 2022-07-15 NOTE — PROGRESS NOTES
Hospitalist Progress Note      PCP: Tutu Ortiz MD    Date of Admission: 6/17/2022    Hospital Course:   Admitted for resp failure from CHF , pna, and unable to manage own secretions with advanced parkinson's, eventually intubated    Prolonged vent support - there were discussions about trach and PEG support, though able to extubate eventually. Patient was extubated on 6/27/2022. She was then placed on BiPAP. Continued on O2 daytime and BiPAP HS. Transferred out of ICU    Secretions remain a major problem. Resp culture (BAL) growing candida - started antifungal.     Repeat emesis 7/1 and spiking high fever and worsening O2 requirement - she was transferred back to ICU on 7/1 overnight. Continued on BiPAP    7/7- S.p bronch with clearance of mucous plugs   S/p FEES by SLP team    Transferred out of ICU to PCU on 7/9      Subjective:   7/11- doing better. On Bipap. Wants bipap removed this am. No fever. 7/12  Patient was transferred back to the ICU for worsening respiratory status. Placed on BiPAP. Has excessive upper airway secretions  Chest x-ray showed complete opacification of left hemithorax    7/13  Intubated yesterday. Underwent bronchoscopy yesterday and today. 7/14  On Levophed. Continues to be on the vent    7/15  Fevers last night and this morning. Drop in H&H  On the vent.         Medications:  Reviewed    Infusion Medications    sodium chloride      dexmedetomidine HCl in NaCl 0.6 mcg/kg/hr (07/15/22 0609)    fentaNYL 100 mcg/hr (07/14/22 2316)    norepinephrine Stopped (07/14/22 2145)    sodium chloride      sodium chloride 5 mL/hr at 07/10/22 0747     Scheduled Medications    sennosides-docusate sodium  1 tablet Oral BID    furosemide  80 mg IntraVENous BID    albumin human  25 g IntraVENous Q6H    heparin (porcine)  5,000 Units SubCUTAneous 3 times per day    potassium bicarb-citric acid  20 mEq Oral Once    sodium bicarbonate  650 mg Oral Daily    acetylcysteine 600 mg Inhalation BID    ipratropium-albuterol  1 ampule Inhalation Q4H While awake    pantoprazole  40 mg IntraVENous BID    chlorhexidine  15 mL Mouth/Throat BID    sodium chloride flush  10 mL IntraVENous 2 times per day    atorvastatin  40 mg Oral Nightly    carbidopa-levodopa  1 tablet Oral 4x Daily    [Held by provider] DULoxetine  60 mg Oral Daily    lamoTRIgine  50 mg Oral BID    montelukast  10 mg Oral Daily    propafenone  150 mg Oral 3 times per day     PRN Meds: sodium chloride, midazolam, fentanNYL, potassium chloride, magnesium sulfate, sodium phosphate IVPB **OR** sodium phosphate IVPB, morphine, carboxymethylcellulose PF, sodium chloride, sodium chloride flush, sodium chloride, promethazine **OR** ondansetron, acetaminophen **OR** acetaminophen, albuterol      Intake/Output Summary (Last 24 hours) at 7/15/2022 0805  Last data filed at 7/15/2022 2783  Gross per 24 hour   Intake 1613.95 ml   Output 2370 ml   Net -756.05 ml         Physical Exam Performed:    BP (!) 126/48   Pulse 77   Temp (!) 100.7 °F (38.2 °C) (Bladder)   Resp 24   Ht 5' 6\" (1.676 m)   Wt 247 lb 12.8 oz (112.4 kg)   SpO2 94%   BMI 40.00 kg/m²     General: Sedated, intubated  Mucous Membranes:  Pink , anicteric  Left opaque cornea  Neck: No JVD, no carotid bruit, no thyromegaly  Chest: Diminished breath sounds with diffuse rhonchi. No wheezes  Cardiovascular:  RRR S1S2 heard, no murmurs or gallops  Abdomen:  Soft, obese  undistended, non tender, no organomegaly, BS present  Extremities: Left UE picc noted  No edema or cyanosis.  Distal pulses well felt  Neurological : Sedated        Labs:   Recent Labs     07/13/22  0450 07/14/22  0540 07/14/22  1815 07/15/22  0425   WBC 13.0* 10.2  --  10.0   HGB 9.4* 7.5* 7.2* 6.8*   HCT 29.0* 22.8* 21.7* 20.2*    240  --  195       Recent Labs     07/13/22  0450 07/14/22  0540 07/15/22  0425    138 135*   K 4.0 3.6 4.0   CL 92* 95* 93*   CO2 31 28 30   BUN 31* 39* 41* CREATININE 2.6* 2.9* 3.1*   CALCIUM 10.0 9.6 9.0   PHOS 3.8 3.3 3.1       No results for input(s): AST, ALT, BILIDIR, BILITOT, ALKPHOS in the last 72 hours. No results for input(s): INR in the last 72 hours. No results for input(s): Nelly Kras in the last 72 hours. Urinalysis:      Lab Results   Component Value Date/Time    NITRU Negative 07/12/2022 01:33 PM    WBCUA 3-5 07/12/2022 01:33 PM    BACTERIA Rare 07/12/2022 01:33 PM    RBCUA 0-2 07/12/2022 01:33 PM    BLOODU TRACE-LYSED 07/12/2022 01:33 PM    SPECGRAV 1.010 07/12/2022 01:33 PM    GLUCOSEU Negative 07/12/2022 01:33 PM       Radiology:  XR CHEST PORTABLE   Final Result   No significant improvement. Moderate diffuse infiltrates and/or congestion   identified in the lungs. XR CHEST PORTABLE   Final Result   Bilateral pleuroparenchymal disease, increased on the right         XR CHEST PORTABLE   Final Result   Interval worsening with increased pneumonia/consolidation left lung and   mild-to-moderate congestion. XR CHEST PORTABLE   Final Result   Lines and tubes in satisfactory position with ill-defined opacifications in   the left lung base and small to moderate size residual left pleural effusion. XR CHEST PORTABLE   Final Result   1. Endotracheal tube terminates in appropriate position above the nirali. The enteric tube courses off the field of view in the upper abdomen. 2. Improved aeration of the left upper lung field with persistent   consolidation and/or effusion opacifying the left mid and lower lung fields. XR CHEST PORTABLE   Final Result   Persistent complete opacification of the left hemithorax. XR CHEST PORTABLE   Final Result   Interval complete opacification of the left hemithorax. CT CHEST WO CONTRAST   Final Result   1. Multifocal bronchopneumonia, with progression since 06/17/2022.   2. Small bilateral pleural effusions. 3. Narrowing of the AP diameter of the trachea.    4. Mild cardiomegaly. XR CHEST PORTABLE   Final Result   Airspace disease within the mid to lower lung zones is slightly increased as   compared to prior. Possible small left pleural effusion. CT ABDOMEN PELVIS WO CONTRAST Additional Contrast? None   Final Result   1. Nonspecific, nonobstructive gaseous bowel pattern. 2. Extensive diverticulosis of the left colon, lacking acute inflammatory   changes. 3. Bibasilar consolidations, atelectasis versus pneumonia. Pleural effusions   have resolved since 06/23/2022. XR ABDOMEN (KUB) (SINGLE AP VIEW)   Final Result   1. Gaseous distention of the stomach, otherwise, unremarkable bowel gas   pattern. XR CHEST PORTABLE   Final Result   Stable chest.  Persistent left lower lobe opacification with probable   effusion. XR CHEST PORTABLE   Final Result   Persistent left lower lobe opacification and effusion. Interval clearing of   perihilar edema and right basilar opacification and effusion. XR CHEST PORTABLE   Final Result   No significant interval change in bilateral airspace disease and small   effusions. Unchanged positioning of support devices. XR CHEST PORTABLE   Final Result   Increasing basilar opacities with probable small effusions. Unchanged positioning of support devices. XR CHEST PORTABLE   Final Result   1. No significant change. XR CHEST PORTABLE   Final Result   No significant interval change in basilar predominant airspace disease and   small left greater than right pleural effusions. Pulmonary vascular   congestion. CT ABDOMEN PELVIS WO CONTRAST Additional Contrast? None   Final Result   Colonic diverticulosis without acute diverticulitis. Nonspecific rectal wall   thickening. Partially visualized pleural effusions with adjacent compressive   atelectasis.          XR CHEST PORTABLE   Final Result   Again identified is bibasilar airspace disease with small pleural effusions   and mild increased right perihilar infiltrates. This may be related to   atelectasis or pneumonia. Cardiomegaly. XR CHEST PORTABLE   Final Result   Stable examination with bilateral small effusions and bibasilar atelectasis. Stable ET tube and enteric tube. XR CHEST PORTABLE   Final Result   No acute process. Bibasilar hypoaeration      Stable cardiomegaly         IR PICC WO SQ PORT/PUMP > 5 YEARS   Final Result      XR CHEST PORTABLE   Final Result   Supportive tubing projects in stable position. Partial regression of atelectasis in the right lung base. Persisting   consolidation or or atelectasis in the left lung base. XR CHEST PORTABLE   Final Result   1. Endotracheal tube terminates in appropriate position above the nirali. 2.  Enteric tube terminates in the body of the stomach. 3.  Basilar opacities again demonstrated, right greater than left. Interval   improved aeration of the left lung base. XR CHEST PORTABLE   Final Result   Increasing opacity in the right base, lobar atelectasis versus accumulating   right pleural effusion. Combination of both entities or underlying pneumonia   also possible. Persisting left basilar airspace disease and small pleural effusion. XR CHEST PORTABLE   Final Result   Hazy bilateral lobe airspace opacities and small left pleural effusion. This   could represent atelectasis or pneumonia in the appropriate clinical setting. CT CHEST WO CONTRAST   Final Result   1. Respiratory motion in the lower lungs. The right base has linear and   dependent atelectasis. The left does have atelectatic changes but could also   have some patchy opacities from pneumonia or aspiration at the left base. 2.  There is no pleural effusion or pneumothorax. 3.  Narrowing of the AP diameter of the trachea with bulging of the   membranous portion due to the gas distended but nondilated esophagus. XR CHEST PORTABLE   Final Result   1. Bibasilar heterogeneous opacities may represent subsegmental atelectasis   and/or mild pulmonary edema. Underlying infection is difficult to exclude. Short-term follow-up PA and lateral chest radiographs may be helpful for   further evaluation. 2.  Cardiomegaly. XR CHEST PORTABLE    (Results Pending)           Assessment/Plan:    Principal Problem:    Septicemia ([address]  Active Problems:    Aspiration pneumonia of left lower lobe (HCC)    Severe protein-calorie malnutrition (HCC)    Chronic anemia    Thrush    MRSA colonization    BETSY (obstructive sleep apnea)    Metabolic acidosis    Pulmonary congestion    Mucus plugging of bronchi    Atrial fibrillation, chronic (HCC)    Parkinson disease (HCC)    Chronic kidney disease    Acute kidney injury superimposed on CKD ([address]  Resolved Problems:    Shortness of breath    Acute respiratory failure with hypoxia (HCC)         Acute Hypox Resp Failure   Aspiration PNA   Treated with  IV Zosyn and vancomycin. Changed Zosyn to cefepime. DCd vancomycin 6/22. Finished 10 days of Vanco and cefepime  Intubated 6/18. Extubated on 6/27/2022.  -On BiPAP. Alternate BiPAP with Airvo. - SLP involved. - resp culture from BAL is growing candida - started diflucan on 7/1/2022   - spiking high fever 7/1 - back on Abx with Vanc and merrem IV.    - now on 4 L-> 5L o2 , weaning as able               - poor physical condition limiting clearance of resp secretions- sp repeat bronch with clearing of mucous on 7/7/22 . On inhaled Mucomyst     7/12  Transferred back to ICU for worsening respiratory status. Chest x-ray showed complete opacification left hemithorax. Placed on BiPAP. The plan is to intubate her and do a bronchoscopy. Currently not on any antibiotics   Intubated 7/12. Bronchoscopy with BAL cultures  Plan for trach/PEG  Small left pleural effusion by bedside ultrasound  Continues to have fevers. ID following. Repeat cultures. Check procalcitonin. Mildly elevated  Hold off on antibiotics for now per ID    Sepsis POA - due to above. Recurrent with aspiration  Now off antibiotics  Currently on Levophed    Dysphagia. Abnormal CT  Cough with every meal intake per family report. Underlying Parkinson's. - continues to struggle with secretions. - CT abd pelvis 7/2 - actually fairly unremarkable. Melena  GI bleed  - drop in H/H   -GI consulted  -H&H held stable.  -Has since resumed eliquis  - IV PPI BID  Drop in H&H.  1 unit of red cells transfused 7/15      FARHEEN on CKD IIIB  -Likely secondary to ATN in the setting of pneumonia, hypotension and elevated vancomycin levels  -Had lasix gtt earlier this admission.  -Back on IV Lasix now, creatinine   Monitor BMP  Hold Lasix  Creatinine worsening. Nephrology consult  IV Lasix with IV albumin per nephro(held currently by critical care)     Parkinson's disease   - on Sinemet. Afib Paroxysmal   -Rythmol   -Eliquis held     HTN   - BP on the low side. Nifedipine stopped due to this and also leg edema. Consider alternative when restarting.      Generalized weakness and debility  -Consult PT OT     DVT Prophylaxis: heparin sc   eliquis held for bloody secretions seen during bronch and melena  Diet: Diet NPO  Code Status: Full Code      Samara Baldwin MD 7/15/2022 8:05 AM

## 2022-07-15 NOTE — PROGRESS NOTES
[facility] Progress Note           Subjective/   80y.o. year old female who we are seeing in consultation for FARHEEN/CKD. HPI:    She remains in the ICU in critical condition. Kidney function worsening. Swelling worsening. On vent with FiO2 of 50%    ROS: No fever or chills. + edema.    Social:   at bedside     Objective/   GEN:  Chronically ill, /70   Pulse 76   Temp (!) 100.8 °F (38.2 °C) (Bladder)   Resp 24   Ht 5' 6\" (1.676 m)   Wt 247 lb 12.8 oz (112.4 kg)   SpO2 93%   BMI 40.00 kg/m²    From 7/4  HEENT: non-icteric, atraumatic  CV: S1, S2 without r/g; + LE edema  RESP: On vent, no wheezing   ABD: +bs, soft, nt, no hsm  SKIN: warm, no rashes    Data/  Recent Labs     07/13/22  0450 07/14/22  0540 07/14/22  1815 07/15/22  0425   WBC 13.0* 10.2  --  10.0   HGB 9.4* 7.5* 7.2* 6.8*   HCT 29.0* 22.8* 21.7* 20.2*   MCV 84.9 85.3  --  85.7    240  --  195       Recent Labs     07/13/22  0450 07/14/22  0540 07/15/22  0425    138 135*   K 4.0 3.6 4.0   CL 92* 95* 93*   CO2 31 28 30   GLUCOSE 107* 130* 110*   PHOS 3.8 3.3 3.1   MG 2.30 2.00 1.80   BUN 31* 39* 41*   CREATININE 2.6* 2.9* 3.1*   LABGLOM 18* 16* 14*   GFRAA 21* 19* 17*       Component      Latest Ref Rng & Units 6/22/2022 6/22/2022           4:10 PM  4:10 PM   Color, UA      Straw/Yellow Yellow    Clarity, UA      Clear Clear    Glucose, UA      Negative mg/dL Negative    Bilirubin, Urine      Negative Negative    Ketones, Urine      Negative mg/dL TRACE (A)    Specific Gravity, UA      1.005 - 1.030 1.025    Blood, Urine      Negative Negative    pH, UA      5.0 - 8.0 5.5    Protein, UA      Negative mg/dL TRACE (A)    Urobilinogen, Urine      <2.0 E.U./dL 0.2    Nitrite, Urine      Negative Negative    Leukocyte Esterase, Urine      Negative Negative    Microscopic Examination       YES    Urine Type       NotGiven    Mucus, UA      None Seen /LPF Rare (A)    WBC, UA      0 - 5 /HPF 10-20 (A) RBC, UA      0 - 4 /HPF 3-4    Epithelial Cells, UA      0 - 5 /HPF 0-1    Bacteria, UA      None Seen /HPF 2+ (A)    Sodium, Ur      Not Established mmol/L  <20   Potassium, Ur      Not Established mmol/L  26.3   Chloride      Not Established mmol/L  <20       Assessment/     - Acute kidney injury - multi-factorial ATN injury in setting of aspiration PNA, hypotension   Scr is up to  3.1 / Volume overloaded    Non-oliguric and responding to IV lasix, BP stable at this point     - Chronic kidney disease stage 3b - background DM/HTN/CHF              SCr 1.6              Follows with [facility]     - Acute hypoxemic respiratory failure/Aspiration PNA/Pulmonary edema    On vent, improved FiO2 requirements over the last 24 hours   S/p bronch    Planning for trach      - Diastolic CHF     - Atrial fibrillation    - Anemia - prn prbc's     - Hypertension   Range is now low but stable    - Hypokalemia, improved. -Metabolic acidosis, resolved    -Dysphagia, patient has been started on some puréed food    Plan/     Continue IV lasix  Follow labs  Hemodynamic support   Monitor in/outs   Discussed with , rate of rise on Scr is better over the last 24 hours. Hopefully she would reach plateau phase and avoid dialysis. I would not see any need for dialysis over the weekend based on current trajectory    ____________________________________  Emma Estrella MD  [facility]  [email or web address]  Office: [phone]

## 2022-07-15 NOTE — PROGRESS NOTES
this am Wound Type: Stage II, Pressure Injury (stage 2 pressure injury on R buttocks)       Current Nutrition Intake & Therapies:    Average Meal Intake: NPO  Average Supplements Intake: NPO  Current Tube Feeding (TF) Orders:  Feeding Route: PEG  Formula: Renal Formula  Schedule: Continuous  Feeding Regimen: Nepro with a goal rate of 15 ml/hr x 20 hours with propofol at 40 mcg x 24 hours  Additives/Modulars: None  Water Flushes: 30 ml water flushes every 3 hours for tube patency  Current TF & Flush Orders Provides: TF held this am for trach/PEG procedures today  Goal TF & Flush Orders Provides: Nepro with a goal rate of 15 ml/hr x 20 hours (with propofol at 40 mcg x 24 hours) = 300 ml TV, 540 kcals, 24 g protein, and 218 ml free water + 30 ml water flushes every 3 hours for tube patency    Anthropometric Measures:  Height: 5' 6\" (167.6 cm)  Ideal Body Weight (IBW): 130 lbs (59 kg)    Admission Body Weight: 270 lb (122.5 kg) (obtained on 6/18/22; actual weight)  Current Body Weight: 247 lb 12.8 oz (112.4 kg) (obtained on 7/15/22; actual weight), 190.6 % IBW.  Weight Source: Bed Scale  Current BMI (kg/m2): 40  Usual Body Weight: 270 lb (122.5 kg) (obtained on 6/18/22; actual weight)  % Weight Change (Calculated): -8.2  Weight Adjustment For: No Adjustment                 BMI Categories: Obese Class 3 (BMI 40.0 or greater) (40.00)    Estimated Daily Nutrient Needs:  Energy Requirements Based On: Kcal/kg  Weight Used for Energy Requirements: Current  Energy (kcal/day): 912 - 1254 kcals based on 8-11 kcals/kg/CBW  Weight Used for Protein Requirements: Ideal  Protein (g/day): 148 - 153 g protein based on 2.5-2.6 g/kg/IBW  Method Used for Fluid Requirements: 1 ml/kcal  Fluid (ml/day): 912 - 1254 ml    Nutrition Diagnosis:   Inadequate oral intake related to inadequate protein-energy intake, impaired respiratory function as evidenced by NPO or clear liquid status due to medical condition, intubation, intake 0-25%, intake 26-50%, lab values, weight loss    Nutrition Interventions:   Food and/or Nutrient Delivery: Continue NPO, Start Tube Feeding  Nutrition Education/Counseling: No recommendation at this time  Coordination of Nutrition Care: Continue to monitor while inpatient, Interdisciplinary Rounds, Coordination of Care  Plan of Care discussed with: ICU Team    Goals:  Previous Goal Met: Goal(s) Achieved  Goals: Initiate nutrition support       Nutrition Monitoring and Evaluation:   Behavioral-Environmental Outcomes: None Identified  Food/Nutrient Intake Outcomes: Enteral Nutrition Intake/Tolerance  Physical Signs/Symptoms Outcomes: Biochemical Data, Constipation, GI Status, Fluid Status or Edema, Hemodynamic Status, Nutrition Focused Physical Findings, Skin, Weight    Discharge Planning:     Too soon to determine     Yesy Pratt, [address] [place] [address],   Contact: [phone]

## 2022-07-15 NOTE — PROGRESS NOTES
Bedside report and Pt care transferred to [facility]. Pt denies any assistance at this time.

## 2022-07-15 NOTE — PROGRESS NOTES
07/15/22 0348   NICU Vent Information   Vent Type 980   Vent Mode AC/VC   Vt (Set, mL) 355 mL   Vt Exhaled 342 mL   Resp Rate (Set) 24 bmp   Rate Measured 24 br/min   Minute Volume 8.43 Liters   Peak Flow 60 L/min   Pressure Support 0 cmH20   FiO2  50 %   Peak Inspiratory Pressure 32 cmH2O   I:E Ratio 1:2.90   Sensitivity 3   PEEP/CPAP (cmH2O) 10   Mean Airway Pressure 17 cmH20   Cough/Sputum   Sputum How Obtained Endotracheal   Cough Productive   Sputum Amount Small   Sputum Color Creamy; Red   Tenacity Thick   Breath Sounds   Right Upper Lobe Diminished   Right Middle Lobe Diminished   Right Lower Lobe Diminished   Left Upper Lobe Diminished   Left Lower Lobe Diminished   Additional Respiratory Assessments   Heart Rate 75   Resp 24   SpO2 93 %   Vent Alarm Settings   High Pressure  45 cmH2O   Low Minute Volume Alarm 4 L/min   Vt Low  210 mL   RR High 40 br/min   Apnea (Secs) 20 secs   Ambu Bag With Mask At Bedside Yes   Patient Observation   Observations 7.5 ett 22 at lip

## 2022-07-15 NOTE — ANESTHESIA PRE PROCEDURE
Department of Anesthesiology  Preprocedure Note       Name:  Blake Omer   Age:  80 y.o.  :  1940                                          MRN:  [id number]         Date:  7/15/2022      Surgeon: Ronal Garcia):  Arnaldo Alfaro MD    Procedure: Procedure(s):  TRACHEOTOMY WITH PERCUTANEOUS ENDOSCOPIC GASTROSTOMY TUBE PLACEMENT    Medications prior to admission:   Prior to Admission medications    Medication Sig Start Date End Date Taking? Authorizing Provider   lidocaine (XYLOCAINE) 5 % ointment Apply topically every 3 hours as needed for Pain Apply topically as needed.    Yes Historical Provider, MD   cetirizine (ZYRTEC) 10 MG tablet TAKE 1/2 TABLET BY MOUTH EVERY DAY 22   Avril Gonzalez MD   montelukast (SINGULAIR) 10 MG tablet TAKE 1 TABLET EVERY DAY 22   MAHAD Salinas CNP   apixaban (ELIQUIS) 2.5 MG TABS tablet Take 1 tablet by mouth 2 times daily 22   Rubén Feliciano MD   valsartan (DIOVAN) 80 MG tablet Take 1 tablet by mouth daily 22   Rubén Feliciano MD   albuterol sulfate  (90 Base) MCG/ACT inhaler INHALE 2 PUFFS INTO THE LUNGS EVERY 6 HOURS AS NEEDED FOR WHEEZING 22   MAHAD Diego CNP   furosemide (LASIX) 40 MG tablet TAKE 1/2 TABLET EVERY DAY 3/2/22   Rubén Feliciano MD   NIFEdipine (PROCARDIA XL) 90 MG extended release tablet TAKE 1 TABLET EVERY DAY 3/2/22   MAHAD Gruber CNP   metoprolol tartrate (LOPRESSOR) 50 MG tablet TAKE 1 TABLET TWICE DAILY 3/2/22   MAHAD Salinas CNP   DULoxetine (CYMBALTA) 60 MG extended release capsule TAKE 1 CAPSULE EVERY DAY 3/2/22   MAHAD Salinas CNP   fluticasone-vilanterol (BREO ELLIPTA) 100-25 MCG/INH AEPB inhaler INHALE 1 PUFF EVERY DAY 21   MAHAD Diego CNP   atorvastatin (LIPITOR) 40 MG tablet TAKE 1 AND 1/2 TABLETS EVERY DAY 21   Rubén Feliciano MD   zinc oxide 16 % OINT ointment Apply topically 4 times daily as needed for Dry Skin  Patient not taking: Reported on 6/17/2022 11/3/21   Milo Morales APRN - CNP   propafenone (RYTHMOL) 150 MG tablet TAKE 1 TABLET BY MOUTH EVERY 8 HOURS 11/3/21   Jonathon Vincent MD   ferrous sulfate (IRON 325) 325 (65 Fe) MG tablet Take 325 mg by mouth 2 times daily  2/4/21 12/2/21  Historical Provider, MD   fluticasone (FLONASE) 50 MCG/ACT nasal spray USE 2 PUFFS IN EACH NOSTRIL DAILY 1/8/21   BIJAN Hazel   carbidopa-levodopa (SINEMET)  MG per tablet Take 1 tablet by mouth 4 times daily    Historical Provider, MD   Alpha-Lipoic Acid 300 MG TABS Take by mouth daily    Historical Provider, MD   lamoTRIgine (LAMICTAL) 25 MG tablet Take 50 mg by mouth 2 times daily     Historical Provider, MD   Handicap Placard MISC by Does not apply route DX: [facility]  parkinson's disease  Exp: 9/1/2024 9/27/19   BIJAN Hazel   ipratropium-albuterol (DUONEB) 0.5-2.5 (3) MG/3ML SOLN nebulizer solution Inhale 3 mLs into the lungs every 6 hours as needed for Shortness of Breath DX: J45.40 2/6/19   BIJAN Hazel   Multiple Vitamins-Minerals (THERAPEUTIC MULTIVITAMIN-MINERALS) tablet Take 1 tablet by mouth daily    Historical Provider, MD   solifenacin (VESICARE) 5 MG tablet Take 1 tablet by mouth daily.  6/25/13 10/29/13  Eleanor Guardado MD       Current medications:    Current Facility-Administered Medications   Medication Dose Route Frequency Provider Last Rate Last Admin    0.9 % sodium chloride infusion   IntraVENous PRN Audie Lantigua MD        sennosides-docusate sodium (SENOKOT-S) 8.6-50 MG tablet 1 tablet  1 tablet Oral BID Julia Martin MD   1 tablet at 07/14/22 2021    furosemide (LASIX) injection 80 mg  80 mg IntraVENous BID Gelacio Mcclendon MD   80 mg at 07/14/22 1701    albumin human 5 % IV solution 25 g  25 g IntraVENous Q6H Gelacio Mcclendon MD   Stopped at 07/15/22 0518    dexmedetomidine (PRECEDEX) 400 mcg in sodium chloride 0.9 % 100 mL infusion  0.1-1.5 mcg/kg/hr IntraVENous Continuous Julia Martin MD 17 mL/hr at 07/15/22 0609 0.6 mcg/kg/hr at 07/15/22 0609    fentaNYL (SUBLIMAZE) 1,000 mcg in sodium chloride 0.9% 100 mL infusion   mcg/hr IntraVENous Continuous Ronan Wyatt MD 10 mL/hr at 07/14/22 2316 100 mcg/hr at 07/14/22 2316    heparin (porcine) injection 5,000 Units  5,000 Units SubCUTAneous 3 times per day Julia Martin MD   5,000 Units at 07/14/22 1558    potassium bicarb-citric acid (EFFER-K) effervescent tablet 20 mEq  20 mEq Oral Once Julia Martin MD        midazolam (VERSED) injection 2 mg  2 mg IntraVENous Q1H PRN Ronan Zuniga MD   2 mg at 07/14/22 0901    fentaNYL (SUBLIMAZE) injection 50 mcg  50 mcg IntraVENous Q1H PRN Ronan Zuniga MD   50 mcg at 07/14/22 0351    norepinephrine (LEVOPHED) 16 mg in dextrose 5 % 250 mL infusion  1-100 mcg/min IntraVENous Continuous Ronan Zuniga MD   Stopped at 07/14/22 2145    sodium bicarbonate tablet 650 mg  650 mg Oral Daily Sherwin Posey MD   650 mg at 07/14/22 6803    potassium chloride 20 mEq/50 mL IVPB (Central Line)  20 mEq IntraVENous PRN Sherwin Posey MD   Paused at 07/14/22 1433    magnesium sulfate 1000 mg in dextrose 5% 100 mL IVPB  1,000 mg IntraVENous PRN Sherwin Posey MD        sodium phosphate 20.73 mmol in sodium chloride 0.9 % 250 mL IVPB  0.16 mmol/kg IntraVENous PRN Sherwin Posey MD        Or    sodium phosphate 41.43 mmol in sodium chloride 0.9 % 500 mL IVPB  0.32 mmol/kg IntraVENous PRN Sherwin Posey MD        acetylcysteine (MUCOMYST) 20 % solution 600 mg  600 mg Inhalation BID Sherwin Posey MD   600 mg at 07/15/22 0743    ipratropium-albuterol (DUONEB) nebulizer solution 1 ampule  1 ampule Inhalation Q4H While awake Sherwin Posey MD   1 ampule at 07/15/22 0743    morphine (PF) injection 1 mg  1 mg IntraVENous Q4H PRN Sherwin Posey MD   1 mg at 07/03/22 2212    pantoprazole (PROTONIX) injection 40 mg  40 mg IntraVENous BID Marc Selby Peng Stewart MD   40 mg at 07/15/22 0818    carboxymethylcellulose PF (THERATEARS) 1 % ophthalmic gel 1 drop  1 drop Both Eyes 4x Daily PRN Romana Just, MD        0.9 % sodium chloride infusion  25 mL IntraVENous PRN Romana Just, MD        chlorhexidine (PERIDEX) 0.12 % solution 15 mL  15 mL Mouth/Throat BID Romana Just, MD   15 mL at 07/14/22 2021    sodium chloride flush 0.9 % injection 10 mL  10 mL IntraVENous 2 times per day Romana Just, MD   10 mL at 07/15/22 0818    sodium chloride flush 0.9 % injection 10 mL  10 mL IntraVENous PRN Romana Just, MD        0.9 % sodium chloride infusion   IntraVENous PRN Romana Just, MD 5 mL/hr at 07/10/22 0747 Rate Verify at 07/10/22 0747    promethazine (PHENERGAN) tablet 12.5 mg  12.5 mg Oral Q6H PRN Romana Just, MD        Or    ondansetron [place] [facility]) injection 4 mg  4 mg IntraVENous Q6H PRN Romana Just, MD   4 mg at 07/01/22 0657    acetaminophen (TYLENOL) tablet 650 mg  650 mg Oral Q6H PRN Romana Just, MD   650 mg at 07/14/22 2037    Or    acetaminophen (TYLENOL) suppository 650 mg  650 mg Rectal Q6H PRN Romana Just, MD   650 mg at 07/02/22 4753    atorvastatin (LIPITOR) tablet 40 mg  40 mg Oral Nightly Romana Just, MD   40 mg at 07/14/22 2021    carbidopa-levodopa (SINEMET)  MG per tablet 1 tablet  1 tablet Oral 4x Daily Romana Just, MD   1 tablet at 07/14/22 2021    [Held by provider] DULoxetine (CYMBALTA) extended release capsule 60 mg  60 mg Oral Daily Romana Just, MD   60 mg at 07/14/22 6459    lamoTRIgine (LAMICTAL) tablet 50 mg  50 mg Oral BID Romana Just, MD   50 mg at 07/14/22 2026    montelukast (SINGULAIR) tablet 10 mg  10 mg Oral Daily Romana Just, MD   10 mg at 07/14/22 0808    propafenone (RYTHMOL) tablet 150 mg  150 mg Oral 3 times per day Romana Just, MD   150 mg at 07/15/22 0611    albuterol (PROVENTIL) nebulizer solution 2.5 mg  2.5 mg Nebulization Q4H PRN Jd Gibson MD   2.5 mg at 07/13/22 0044       Allergies:     Allergies   Allergen Reactions    Levaquin [Levofloxacin]      Pt was given in er and developed hives and redness    Metoclopramide     Phenazopyridine     Pyridium [Phenazopyridine Hcl]     Reglan [Metoclopramide Hcl]     Reglan [Metoclopramide Hcl]      tremors       Problem List:    Patient Active Problem List   Diagnosis Code    GERD (gastroesophageal reflux disease) K21.9    Essential hypertension I10    Hyperlipidemia E78.5    Restless leg syndrome G25.81    Anemia of chronic disease D63.8    Postural dizziness R42    Moderate persistent asthma J45.40    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-sided sciatica M54.41, G89.29    Morbid obesity with BMI of 40.0-44.9, adult ([facility]) E66.01, Z68.41    Type 2 diabetes mellitus with stage 4 chronic kidney disease, without long-term current use of insulin ([facility]) E11.22, N18.4    CAD (coronary artery disease) I25.10    Parkinson disease ([facility] [address] G20    Severe obstructive sleep apnea G47.33    Hypothyroidism E03.9    Hepatic steatosis K76.0    Diverticulosis K57.90    Blind left eye H54.40    L carotid artery stenosis s/p CEA I65.29    Septicemia ([facility]) A41.9    PAF (paroxysmal atrial fibrillation) ([facility]) I48.0    COPD (chronic obstructive pulmonary disease) ([facility]) D90.3    Diastolic congestive heart failure ([facility]) I50.30    Partial symptomatic epilepsy with complex partial seizures, not intractable, without status epilepticus ([facility]) G40.209    Chronic kidney disease N18.9    Acute kidney injury superimposed on CKD ([facility]) N17.9, N18.9    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    Sialadenitis K11.20    Chest pain R07.9    Pulmonary HTN ([facility]) I27.20    Mitral valve insufficiency I34.0    C. difficile colitis A04.72    Laceration of left lower extremity W03.005T    Pulmonary nodules R91.8    Sensorineural hearing loss, bilateral H90.3    Aspiration pneumonia of left lower lobe [facility] J69.0    Severe protein-calorie malnutrition ([facility]) E43    Chronic anemia D64.9    Thrush B37.0    MRSA colonization Z22.322    BETSY (obstructive sleep apnea) K84.21    Metabolic acidosis A42.3    Pulmonary congestion R09.89    Mucus plugging of bronchi T17.500A    Atrial fibrillation, chronic ([facility]) I48.20       Past Medical History:        Diagnosis Date    Anemia, unspecified 2010    neg bone marrow    Asthma     Atrial fibrillation ([address]     Baker's cyst     Basal cell carcinoma of left cheek 3/12/2019    Blind left eye     Carotid artery stenoses     Carotid stenosis 2002    left    Cervical spinal cord compression ([address] 11/2010    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-sided sciatica 8/9/2016    CKD (chronic kidney disease) stage 3, GFR 30-59 ml/min ([facility])     Community acquired pneumonia of left lower lobe of lung 9/8/2018    CRI     Detached retina, left     Diverticulosis     DJD (degenerative joint disease)     Edema     chronic    Fatty liver     GERD (gastroesophageal reflux disease)     Hearing loss     Herniated lumbar intervertebral disc     Hx stage 2 sacral decubitus ulcer 4/13/2018    Hyperlipidemia     Hypertension     Hypothyroid 1/27/2015    Morbid obesity with BMI of 40.0-44.9, adult ([address] 2/10/2017    BETSY treated with BiPAP     Parkinson's disease ([facility] [address]     Partial symptomatic epilepsy with complex partial seizures, not intractable, without status epilepticus ([facility] [address] 1/24/2020    Restless leg syndrome     Restless legs syndrome     Rheumatic fever 1950    2x IN [place]    Sleep apnea     Tremor, essential 8/16/2010    Type 2 diabetes mellitus without complication ([facility]     Type II or unspecified type diabetes mellitus without mention of complication, not stated as uncontrolled        Past Surgical History:        Procedure Laterality Date    APPENDECTOMY      BRONCHOSCOPY N/A 7/7/2022 BRONCHOSCOPY ALVEOLAR LAVAGE performed by Yasmani Devi MD at [address]  7/7/2022    BRONCHOSCOPY THERAPUTIC ASPIRATION INITIAL performed by Yasmani Devi MD at [address]      left    CATARACT REMOVAL      CERVICAL FUSION  11/2010    CHOLECYSTECTOMY      COLONOSCOPY N/A 3/8/2020    COLONOSCOPY FLEXIBLE W/ DECOMPRESSION performed by Kay Villalobos MD at [address] (CERVIX STATUS UNKNOWN)      IR MIDLINE CATH  10/8/2021    IR MIDLINE CATH 10/8/2021 [address] SPECIAL PROCEDURES    JOINT REPLACEMENT Bilateral 2003    KNEE SURGERY      replacement x 2    OVARY REMOVAL      SHOULDER SURGERY      TONSILLECTOMY      TUBAL LIGATION      UPPER GASTROINTESTINAL ENDOSCOPY  03/17/2020    Esophagitis    UPPER GASTROINTESTINAL ENDOSCOPY N/A 3/17/2020    EGD performed by Rich Torres MD at [facility]         Social History:    Social History     Tobacco Use    Smoking status: Former    Smokeless tobacco: Never   Substance Use Topics    Alcohol use:  No                                Counseling given: Not Answered      Vital Signs (Current):   Vitals:    07/15/22 0620 07/15/22 0700 07/15/22 0800 07/15/22 0900   BP:  (!) 126/48 (!) 129/50 135/63   Pulse:  77 93 83   Resp:  24 22 24   Temp:  (!) 100.7 °F (38.2 °C)     TempSrc:  Bladder     SpO2:  94% 93% 93%   Weight: 247 lb 12.8 oz (112.4 kg)      Height:                                                  BP Readings from Last 3 Encounters:   07/15/22 135/63   05/24/22 118/60   04/28/22 126/70       NPO Status: Time of last liquid consumption: 2359                        Time of last solid consumption: 0800                        Date of last liquid consumption: 07/06/22                        Date of last solid food consumption: 07/06/22    BMI:   Wt Readings from Last 3 Encounters: 07/15/22 247 lb 12.8 oz (112.4 kg)   05/24/22 264 lb (119.7 kg)   05/11/22 258 lb (117 kg)     Body mass index is 40 kg/m².     CBC:   Lab Results   Component Value Date/Time    WBC 10.0 07/15/2022 04:25 AM    RBC 2.36 07/15/2022 04:25 AM    HGB 6.8 07/15/2022 04:25 AM    HCT 20.2 07/15/2022 04:25 AM    MCV 85.7 07/15/2022 04:25 AM    RDW 13.6 07/15/2022 04:25 AM     07/15/2022 04:25 AM       CMP:   Lab Results   Component Value Date/Time     07/15/2022 04:25 AM    K 4.0 07/15/2022 04:25 AM    K 3.8 06/19/2022 05:49 AM    CL 93 07/15/2022 04:25 AM    CO2 30 07/15/2022 04:25 AM    BUN 41 07/15/2022 04:25 AM    CREATININE 3.1 07/15/2022 04:25 AM    GFRAA 17 07/15/2022 04:25 AM    GFRAA >60 02/21/2013 06:37 AM    AGRATIO 0.7 06/19/2022 05:49 AM    LABGLOM 14 07/15/2022 04:25 AM    GLUCOSE 110 07/15/2022 04:25 AM    PROT 5.6 06/23/2022 04:52 AM    PROT 6.5 02/20/2013 12:40 PM    CALCIUM 9.0 07/15/2022 04:25 AM    BILITOT <0.2 06/23/2022 04:52 AM    ALKPHOS 66 06/23/2022 04:52 AM    AST 14 06/23/2022 04:52 AM    ALT <5 06/23/2022 04:52 AM       POC Tests:   Recent Labs     07/15/22  0806   POCGLU 122*       Coags:   Lab Results   Component Value Date/Time    PROTIME 19.0 06/19/2022 10:04 AM    INR 1.61 06/19/2022 10:04 AM    APTT 19.5 04/09/2021 12:05 PM       HCG (If Applicable): No results found for: PREGTESTUR, PREGSERUM, HCG, HCGQUANT     ABGs:   Lab Results   Component Value Date/Time    PHART 7.525 07/15/2022 04:30 AM    PO2ART 85.1 07/15/2022 04:30 AM    DBG1GOC 37.8 07/15/2022 04:30 AM    KNY0JLM 30.5 07/15/2022 04:30 AM    BEART 7.2 07/15/2022 04:30 AM    G9XJHKSG 97.3 07/15/2022 04:30 AM        Type & Screen (If Applicable):  No results found for: LABABO, LABRH    Drug/Infectious Status (If Applicable):  No results found for: HIV, HEPCAB    COVID-19 Screening (If Applicable):   Lab Results   Component Value Date/Time    COVID19 NOT DETECTED 06/17/2022 12:57 AM           Anesthesia Evaluation  Patient summary reviewed and Nursing notes reviewed no history of anesthetic complications:   Airway: Mallampati: Unable to assess / NA         Intubated Dental:          Pulmonary:   (+) pneumonia (L):  COPD (2 L AT NITES, INHALERS/NEBS):  shortness of breath:  sleep apnea (BIPAP):  rhonchi:bilateral asthma:     (-) not a current smoker          Patient did not smoke on day of surgery. Cardiovascular:    (+) hypertension:, CAD:, dysrhythmias: atrial fibrillation, CHF: diastolic, ANN:, hyperlipidemia    (-) pacemaker, past MI, CABG/stent and  angina    ECG reviewed  Rhythm: regular  Rate: normal           Beta Blocker:  Not on Beta Blocker         Neuro/Psych:   (+) neuromuscular disease (LBP / RLS / TREMOR): Parkinson's disease,    (-) seizures, TIA and CVA           GI/Hepatic/Renal:   (+) GERD: well controlled, liver disease (FATTY):, renal disease: CRI, morbid obesity          Endo/Other:    (+) DiabetesType II DM, , hypothyroidism, blood dyscrasia: anemia, arthritis:., electrolyte abnormalities, malignancy/cancer (SKIN). Abdominal:   (+) obese,     Abdomen: soft. Vascular:   + PVD, aortic or cerebral (CAROTID STEN.), . Other Findings: ETT / OGT / [place] / LUE MIDLINE  INFUSIONS: FENTANYL 100 MCG/HR  //  DEXMET. 0. 6MCG/KG/HR  VENT: 355 / 24 / 50% / 8          Anesthesia Plan      general     ASA 4       Induction: intravenous and inhalational.    MIPS: Prophylactic antiemetics administered. Anesthetic plan and risks discussed with patient and spouse. Plan discussed with CRNA. This pre-anesthesia assessment may be used as a history and physical.    DOS STAFF ADDENDUM:    Pt seen and examined, chart reviewed (including anesthesia, drug and allergy history). No interval changes to history and physical examination. Anesthetic plan, risks, benefits, alternatives, and personnel involved discussed with patient.   Patient verbalized an understanding and agrees to proceed.       Diamond Maurice MD  July 15, 2022  9:21 AM      Diamond Maurice MD   7/15/2022

## 2022-07-15 NOTE — H&P
I have reviewed the history and physical dated June/17/2022 and examined the patient and find no relevant changes. I have reviewed with the patient and/or family the risks, benefits, and alternatives to the procedure.

## 2022-07-15 NOTE — PROGRESS NOTES
[facility] Infectious Disease Progress Note      Lina Marte     : 1940    DATE OF VISIT:  7/15/2022  DATE OF ADMISSION:  2022       Subjective:     Lina Marte is a 80 y.o. female whom I've been seeing for a nosocomial aspiration pneumonia a couple of weeks ago. Since I last saw her, I had stopped Abx a few days ago, but then Tuesday into Wednesday she had significant mucus plugging again, unable to manage secretions, complete left sided atelectasis, became more hypoxemic and more fatigued, was transferred back to the ICU, underwent a therapeutic bronchoscopy with some significant mucus plugs, but no mention of much in the way of purulence there. FIO2 down to 50% now, still sedated, on the vent, mildly arouseable to exam, eyes not open. Surgery consulted for tracheostomy and gastrostomy tubes. No real fevers a day or two ago, but then 100s overnight.      Ms. Anjali Rocha has a past medical history of Anemia, unspecified, Asthma, Atrial fibrillation ([address] Baker's cyst, Basal cell carcinoma of left cheek, Blind left eye, Carotid artery stenoses, Carotid stenosis, Cervical spinal cord compression (HCC), 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-sided sciatica, CKD (chronic kidney disease) stage 3, GFR 30-59 ml/min ([address] Community acquired pneumonia of left lower lobe of lung, CRI, Detached retina, left, Diverticulosis, DJD (degenerative joint disease), Edema, Fatty liver, GERD (gastroesophageal reflux disease), Hearing loss, Herniated lumbar intervertebral disc, Hx stage 2 sacral decubitus ulcer, Hyperlipidemia, Hypertension, Hypothyroid, Morbid obesity with BMI of 40.0-44.9, adult ([address] BETSY treated with BiPAP, Parkinson's disease ([address] Partial symptomatic epilepsy with complex partial seizures, not intractable, without status epilepticus ([address] Restless leg syndrome, Restless legs syndrome, Rheumatic fever, Sleep apnea, Tremor, essential, Type 2 diabetes mellitus without complication ([facility] and Type II or unspecified type diabetes mellitus without mention of complication, not stated as uncontrolled.     Current Facility-Administered Medications: 0.9 % sodium chloride infusion, , IntraVENous, PRN  sennosides-docusate sodium (SENOKOT-S) 8.6-50 MG tablet 1 tablet, 1 tablet, Oral, BID  furosemide (LASIX) injection 80 mg, 80 mg, IntraVENous, BID  albumin human 5 % IV solution 25 g, 25 g, IntraVENous, Q6H  dexmedetomidine (PRECEDEX) 400 mcg in sodium chloride 0.9 % 100 mL infusion, 0.1-1.5 mcg/kg/hr, IntraVENous, Continuous  fentaNYL (SUBLIMAZE) 1,000 mcg in sodium chloride 0.9% 100 mL infusion,  mcg/hr, IntraVENous, Continuous  heparin (porcine) injection 5,000 Units, 5,000 Units, SubCUTAneous, 3 times per day  potassium bicarb-citric acid (EFFER-K) effervescent tablet 20 mEq, 20 mEq, Oral, Once  midazolam (VERSED) injection 2 mg, 2 mg, IntraVENous, Q1H PRN  fentaNYL (SUBLIMAZE) injection 50 mcg, 50 mcg, IntraVENous, Q1H PRN  norepinephrine (LEVOPHED) 16 mg in dextrose 5 % 250 mL infusion, 1-100 mcg/min, IntraVENous, Continuous  sodium bicarbonate tablet 650 mg, 650 mg, Oral, Daily  potassium chloride 20 mEq/50 mL IVPB (Central Line), 20 mEq, IntraVENous, PRN  magnesium sulfate 1000 mg in dextrose 5% 100 mL IVPB, 1,000 mg, IntraVENous, PRN  sodium phosphate 20.73 mmol in sodium chloride 0.9 % 250 mL IVPB, 0.16 mmol/kg, IntraVENous, PRN **OR** sodium phosphate 41.43 mmol in sodium chloride 0.9 % 500 mL IVPB, 0.32 mmol/kg, IntraVENous, PRN  acetylcysteine (MUCOMYST) 20 % solution 600 mg, 600 mg, Inhalation, BID  ipratropium-albuterol (DUONEB) nebulizer solution 1 ampule, 1 ampule, Inhalation, Q4H While awake  morphine (PF) injection 1 mg, 1 mg, IntraVENous, Q4H PRN  pantoprazole (PROTONIX) injection 40 mg, 40 mg, IntraVENous, BID  carboxymethylcellulose PF (THERATEARS) 1 % ophthalmic gel 1 drop, 1 drop, Both Eyes, 4x Daily PRN  0.9 % sodium chloride infusion, 25 mL, IntraVENous, PRN  chlorhexidine (PERIDEX) 0.12 % solution 15 mL, 15 mL, Mouth/Throat, BID  sodium chloride flush 0.9 % injection 10 mL, 10 mL, IntraVENous, 2 times per day  sodium chloride flush 0.9 % injection 10 mL, 10 mL, IntraVENous, PRN  0.9 % sodium chloride infusion, , IntraVENous, PRN  promethazine (PHENERGAN) tablet 12.5 mg, 12.5 mg, Oral, Q6H PRN **OR** ondansetron (ZOFRAN) injection 4 mg, 4 mg, IntraVENous, Q6H PRN  acetaminophen (TYLENOL) tablet 650 mg, 650 mg, Oral, Q6H PRN **OR** acetaminophen (TYLENOL) suppository 650 mg, 650 mg, Rectal, Q6H PRN  atorvastatin (LIPITOR) tablet 40 mg, 40 mg, Oral, Nightly  carbidopa-levodopa (SINEMET)  MG per tablet 1 tablet, 1 tablet, Oral, 4x Daily  [Held by provider] DULoxetine (CYMBALTA) extended release capsule 60 mg, 60 mg, Oral, Daily  lamoTRIgine (LAMICTAL) tablet 50 mg, 50 mg, Oral, BID  montelukast (SINGULAIR) tablet 10 mg, 10 mg, Oral, Daily  propafenone (RYTHMOL) tablet 150 mg, 150 mg, Oral, 3 times per day  albuterol (PROVENTIL) nebulizer solution 2.5 mg, 2.5 mg, Nebulization, Q4H PRN     No current Abx therapy. Prior vancomycin-zosyn, then cefepime, then vanco-abdoulaye-FCZ. Allergies: Levaquin [levofloxacin], Metoclopramide, Phenazopyridine, Pyridium [phenazopyridine hcl], Reglan [metoclopramide hcl], and Reglan [metoclopramide hcl]    Pertinent items from the review of systems are discussed in the HPI; the remainder of the ROS was reviewed and is negative.      Objective:     Vital signs over the last 24 hours:  Temp  Av.2 °F (37.9 °C)  Min: 99.6 °F (37.6 °C)  Max: 100.8 °F (38.2 °C)  Pulse  Av.8  Min: 74  Max: 86  Systolic (10LKN), TDW:862 , Min:99 , XWS:014   Diastolic (25RLH), KPM:42, Min:41, Max:82  Resp  Av.8  Min: 14  Max: 29  SpO2  Av.3 %  Min: 93 %  Max: 98 %    Constitutional:  well-developed, well-nourished, overweight, sedated and intubated  Psychiatric:  unable to assess due to sedation  Eyes:  pupils equal, round and reactive to light; sclerae anicteric, conjunctivae not pale  ENT:  atraumatic; oral mucosa much less dry, a bit of mucoid secretions, no definite thrush now (limited exam)  Resp:  Decreased at the bases, slightly coarse  / bronchial breath sounds, a few crackles BL  Cardiovascular:  heart regular, no gallop, no murmur; moderate extremity edema; no IV phlebitis (LUE PICC in place)  GI:  abdomen soft, NT, ND, normal bowel sounds, no palpable masses or organomegaly  : Zimmerman in place, clear yellow urine  Musculoskeletal:  no clubbing, cyanosis or petechiae; extremities with no gross effusions, joint misalignment or acute arthritis  Skin: warm, dry, normal turgor; no rash, no ulcers   ______________________________    Recent Labs     07/15/22  0425 07/14/22  1815 07/14/22  0540 07/13/22  0450   WBC 10.0  --  10.2 13.0*   HGB 6.8* 7.2* 7.5* 9.4*   HCT 20.2* 21.7* 22.8* 29.0*   MCV 85.7  --  85.3 84.9     --  240 339     Lab Results   Component Value Date    CREATININE 3.1 (H) 07/15/2022     Lab Results   Component Value Date    LABALBU 4.0 07/14/2022     Lab Results   Component Value Date    ALT <5 (L) 06/23/2022    AST 14 (L) 06/23/2022    ALKPHOS 66 06/23/2022    BILITOT <0.2 06/23/2022      Lab Results   Component Value Date    LABA1C 6.4 09/27/2021     Other recent pertinent labs: Anion gap 12. PCT yesterday only 0.36. MCV 85.7. ANC up a bit to 8700.  ______________________________    Recent pertinent micro results: All BCx have been negative. Original MRSA swab (+). Prior bronch Cx just candida (2 species).         Tuesday bronch Cx negative so far.   ______________________________    Recent imaging results (last 7 days):     XR CHEST PORTABLE    Result Date: 7/14/2022  Bilateral pleuroparenchymal disease, increased on the right [hard for me to tell how much is actual infiltrate (pneumonia, pneumonitis), or how much is a bit of edema, small effusions, atelectasis -- I do think at least SOME of it is edema.]    XR CHEST PORTABLE    Result Date: 7/13/2022  Interval worsening with increased pneumonia/consolidation left lung and mild-to-moderate congestion. XR CHEST PORTABLE    Result Date: 7/12/2022  Lines and tubes in satisfactory position with ill-defined opacifications in the left lung base and small to moderate size residual left pleural effusion. XR CHEST PORTABLE    Result Date: 7/12/2022  1. Endotracheal tube terminates in appropriate position above the nirali. The enteric tube courses off the field of view in the upper abdomen. 2. Improved aeration of the left upper lung field with persistent consolidation and/or effusion opacifying the left mid and lower lung fields. XR CHEST PORTABLE    Result Date: 7/12/2022  Persistent complete opacification of the left hemithorax. XR CHEST PORTABLE    Result Date: 7/11/2022  Interval complete opacification of the left hemithorax.       Assessment:     Patient Active Problem List   Diagnosis Code    GERD (gastroesophageal reflux disease) K21.9    Essential hypertension I10    Hyperlipidemia E78.5    Restless leg syndrome G25.81    Anemia of chronic disease D63.8    Postural dizziness R42    Moderate persistent asthma J45.40    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-sided sciatica M54.41, G89.29    Morbid obesity with BMI of 40.0-44.9, adult ([facility]) E66.01, Z68.41    Type 2 diabetes mellitus with stage 4 chronic kidney disease, without long-term current use of insulin ([facility]) E11.22, N18.4    CAD (coronary artery disease) I25.10    Parkinson disease ([facility] G20    Severe obstructive sleep apnea G47.33    Hypothyroidism E03.9    Hepatic steatosis K76.0    Diverticulosis K57.90    Blind left eye H54.40    L carotid artery stenosis s/p CEA I65.29    Septicemia ([facility]) A41.9    PAF (paroxysmal atrial fibrillation) ([facility]) I48.0    COPD (chronic obstructive pulmonary disease) ([facility]) C11.5    Diastolic congestive heart failure ([facility]) I50.30    Partial symptomatic epilepsy with complex bronch last Thursday. --          FARHEEN slowly resolving last week, worse again now. Still significant edema. --          Thrush, improved. -- Worsening anemia. -- And another episode of mucus plugging, atelectasis, acute respiratory failure a few days ago. Another therapeutic bronch, mostly for plugs, no purulent secretions really. FIO2 down to 50% now, but temps overnight to the 100s -- probably pulmonary in nature, not sure if infection or aspiration pneumonitis or atelectasis; doubt UTI; probably should make sure she doesn't have a line-related bloodstream infection too. Treatment recs:     Would get two sets of blood cultures this AM -- ordered. Would also repeat a PCT level this morning. If it's rising sharply, would probably err on the side of restarting Abx while new cultures are pending (bronch, and blood). Would consider cycling back to something like vanco-zosyn (she's more recently had cefepime and then meropenem, along with the vanco). But if her PCT hasn't changed much since yesterday, and she's otherwise stable throughout today (temps, BP, FIO2, etc), could follow her closely without Abx and see how she does. Very hard to tell on CXR whether we're potentially seeing new basilar pneumonias, or just aspiration pneumonitis, just some edema and effusions, atelectasis (probably a combination). _________________    I was not necessarily going to be in the hospital over the weekend to see Ms. Telma Tysonkevinflorencia. Will keep an eye on Epic from home.  Please call or text if there are any urgent concerns over the weekend with her clinical course, test results, etc.    Electronically signed by Hakeem Gamez MD on 7/15/2022 at 6:33 AM.

## 2022-07-15 NOTE — PROGRESS NOTES
07/14/22 2329   NICU Vent Information   Vent Type 980   Vent Mode AC/VC   Vt (Set, mL) 355 mL   Vt Exhaled 353 mL   Resp Rate (Set) 24 bmp   Rate Measured 24 br/min   Minute Volume 8.44 Liters   Peak Flow 60 L/min   Pressure Support 0 cmH20   FiO2  50 %   Peak Inspiratory Pressure 38 cmH2O   I:E Ratio 1:2.90   Sensitivity 3   PEEP/CPAP (cmH2O) 10   Mean Airway Pressure 17 cmH20   Cough/Sputum   Sputum How Obtained Endotracheal   Cough Productive   Sputum Amount Small   Sputum Color Creamy; Red   Tenacity Thick   Breath Sounds   Right Upper Lobe Diminished   Right Middle Lobe Diminished   Right Lower Lobe Diminished   Left Upper Lobe Diminished   Left Lower Lobe Diminished   Additional Respiratory Assessments   Heart Rate 77   Resp 24   SpO2 95 %   Position Semi-Mccollum's   Humidification Source Heated wire   Humidification Temp 35.2   Circuit Condensation Drained   Vent Alarm Settings   High Pressure  45 cmH2O   Low Minute Volume Alarm 4 L/min   Vt Low  210 mL   RR High 40 br/min   Apnea (Secs) 20 secs   Ambu Bag With Mask At Bedside Yes   Patient Observation   Observations 7.5 ett 22 at lip

## 2022-07-15 NOTE — PROGRESS NOTES
RT Inhaler-Nebulizer Bronchodilator Protocol Note    There is a bronchodilator order in the chart from a provider indicating to follow the RT Bronchodilator Protocol and there is an Initiate RT Inhaler-Nebulizer Bronchodilator Protocol order as well (see protocol at bottom of note). CXR Findings:  XR CHEST PORTABLE    Result Date: 7/15/2022  No significant improvement. Moderate diffuse infiltrates and/or congestion identified in the lungs. XR CHEST PORTABLE    Result Date: 7/14/2022  Bilateral pleuroparenchymal disease, increased on the right       The findings from the last RT Protocol Assessment were as follows:   History Pulmonary Disease: Chronic pulmonary disease  Respiratory Pattern: Mild dyspnea at rest, irregular pattern, or RR 21-25 bpm  Breath Sounds: Inspiratory and expiratory or bilateral wheezing and/or rhonchi  Cough: Weak, productive  Indication for Bronchodilator Therapy: Wheezing associated with pulm disorder, Mucolytic ordered  Bronchodilator Assessment Score: 14    Aerosolized bronchodilator medication orders have been revised according to the RT Inhaler-Nebulizer Bronchodilator Protocol below. Respiratory Therapist to perform RT Therapy Protocol Assessment initially then follow the protocol. Repeat RT Therapy Protocol Assessment PRN for score 0-3 or on second treatment, BID, and PRN for scores above 3. No Indications - adjust the frequency to every 6 hours PRN wheezing or bronchospasm, if no treatments needed after 48 hours then discontinue using Per Protocol order mode. If indication present, adjust the RT bronchodilator orders based on the Bronchodilator Assessment Score as indicated below.   Use Inhaler orders unless patient has one or more of the following: on home nebulizer, not able to hold breath for 10 seconds, is not alert and oriented, cannot activate and use MDI correctly, or respiratory rate 25 breaths per minute or more, then use the equivalent nebulizer order(s) with same Frequency and PRN reasons based on the score. If a patient is on this medication at home then do not decrease Frequency below that used at home. 0-3 - enter or revise RT bronchodilator order(s) to equivalent RT Bronchodilator order with Frequency of every 4 hours PRN for wheezing or increased work of breathing using Per Protocol order mode. 4-6 - enter or revise RT Bronchodilator order(s) to two equivalent RT bronchodilator orders with one order with BID Frequency and one order with Frequency of every 4 hours PRN wheezing or increased work of breathing using Per Protocol order mode. 7-10 - enter or revise RT Bronchodilator order(s) to two equivalent RT bronchodilator orders with one order with TID Frequency and one order with Frequency of every 4 hours PRN wheezing or increased work of breathing using Per Protocol order mode. 11-13 - enter or revise RT Bronchodilator order(s) to one equivalent RT bronchodilator order with QID Frequency and an Albuterol order with Frequency of every 4 hours PRN wheezing or increased work of breathing using Per Protocol order mode. Greater than 13 - enter or revise RT Bronchodilator order(s) to one equivalent RT bronchodilator order with every 4 hours Frequency and an Albuterol order with Frequency of every 2 hours PRN wheezing or increased work of breathing using Per Protocol order mode. RT to enter RT Home Evaluation for COPD & MDI Assessment order using Per Protocol order mode.     Electronically signed by Yaron Urbina RCP on 7/15/2022 at 10:27 AM

## 2022-07-15 NOTE — PROGRESS NOTES
Pt arrived back from OR at this time. VSS. PRBC's continue infusing.  updated on plan of care at bedside. Will continue to monitor.

## 2022-07-15 NOTE — PROGRESS NOTES
Pulmonary & Critical Care Medicine ICU Progress Note    CC: shortness of breath     Events of Last 24 hours:   Patient still on mechanical ventilation with   355/24/10/50   Off pressors    Chest x-ray still showing infiltrate bilaterally but better     Status post second bronchoscopy did not show much secretions    Bedside echo shows a effusion on the left side    Vascular lines: IV: PICC 6/19/2022 P      Intake/Output Summary (Last 24 hours) at 7/15/2022 0824  Last data filed at 7/15/2022 0800  Gross per 24 hour   Intake 1613.95 ml   Output 2720 ml   Net -1106.05 ml           MV: 6/18/2022 -6/27/22  Vent Mode: AC/VC Resp Rate (Set): 24 bmp/Vt (Set, mL): 355 mL/ /FiO2 : 50 %  Recent Labs     07/13/22  0450 07/15/22  0430   PHART 7.597* 7.525*   PVK1JSB 33.9* 37.8   PO2ART 67.2* 85.1         IV:   sodium chloride      dexmedetomidine HCl in NaCl 0.6 mcg/kg/hr (07/15/22 0609)    fentaNYL 100 mcg/hr (07/14/22 2316)    norepinephrine Stopped (07/14/22 2145)    sodium chloride      sodium chloride 5 mL/hr at 07/10/22 0747       Vitals:  Blood pressure (!) 126/48, pulse 77, temperature (!) 100.7 °F (38.2 °C), temperature source Bladder, resp. rate 24, height 5' 6\" (1.676 m), weight 247 lb 12.8 oz (112.4 kg), SpO2 94 %, not currently breastfeeding. On  5 L  Constitutional: Intubated and sedated  HENT:  Oropharynx is clear and moist.   Neck: No tracheal deviation present. Cardiovascular: Normal heart sounds. No lower extremity edema. Pulmonary/Chest: no breath sound left side   Musculoskeletal: No cyanosis. No clubbing. Skin: Skin is warm and dry. Psychiatric: Normal mood and affect.   Neurologic: Debated and sedated         Scheduled Meds:   sennosides-docusate sodium  1 tablet Oral BID    furosemide  80 mg IntraVENous BID    albumin human  25 g IntraVENous Q6H    heparin (porcine)  5,000 Units SubCUTAneous 3 times per day    potassium bicarb-citric acid  20 mEq Oral Once    sodium bicarbonate  650 mg Oral Daily acetylcysteine  600 mg Inhalation BID    ipratropium-albuterol  1 ampule Inhalation Q4H While awake    pantoprazole  40 mg IntraVENous BID    chlorhexidine  15 mL Mouth/Throat BID    sodium chloride flush  10 mL IntraVENous 2 times per day    atorvastatin  40 mg Oral Nightly    carbidopa-levodopa  1 tablet Oral 4x Daily    [Held by provider] DULoxetine  60 mg Oral Daily    lamoTRIgine  50 mg Oral BID    montelukast  10 mg Oral Daily    propafenone  150 mg Oral 3 times per day       Data:  CBC:   Recent Labs     07/13/22  0450 07/14/22  0540 07/14/22  1815 07/15/22  0425   WBC 13.0* 10.2  --  10.0   HGB 9.4* 7.5* 7.2* 6.8*   HCT 29.0* 22.8* 21.7* 20.2*   MCV 84.9 85.3  --  85.7    240  --  195       BMP:   Recent Labs     07/13/22  0450 07/14/22  0540 07/15/22  0425    138 135*   K 4.0 3.6 4.0   CL 92* 95* 93*   CO2 31 28 30   PHOS 3.8 3.3 3.1   BUN 31* 39* 41*   CREATININE 2.6* 2.9* 3.1*       LIVER PROFILE:   No results for input(s): AST, ALT, LIPASE, BILIDIR, BILITOT, ALKPHOS in the last 72 hours. Invalid input(s): AMYLASE,  ALB    Microbiology:  6/17/2022 SARS-CoV-2 and influenza are negative  6/17/2022 MRSA DNA screen positive  6/17/2022 blood NG  6/17/2022 sputum NRF  6/18/2022 tracheal aspirate NRF  6/19/2022 BAL Candida  6/19/2022 respiratory viral panel negative  7/1/22 Blood cx NGTD  7/7/2022 bronchial washings and BAL Candida krusei      Imaging:  CT chest 7/3/22  images reviewed by me and showed:   1. Multifocal bronchopneumonia, with progression since 06/17/2022.   2. Small bilateral pleural effusions  3. Narrowing of the AP diameter of the trachea  4.  Mild cardiomegaly    ASSESSMENT:  Acute hypoxemic respiratory failure, initially on vent, extubated but then returned to ICU and required BiPAP  Aspiration pneumonia - recurrent with septic shock mucous plugging of bronchi on bronchoscopy 7/7/2022  Tracheobronchomalacia on bronchoscopy 7/7/2022 -  perhaps limiting airway clearance  BETSY on BiPAP 22/15 with 1 L bleed in   Parkinson's disease  Atrial fibrillation  Acute on chronic kidney failure, baseline creatinine 1.6  Acute on chronic anemia, s/p 2 U PRBC 6/23/2022  H/O esophagitis  Pleural effusions are small bilaterally  Bloody sputum    PLAN:  Status post bronchoscopy twice, today bronchoscopy shows no significant secretion much better and clear airway  For trach today? peg tube   To receive 1 unit RBC   Off anticogulation   Stool ocult blood   Received Lasix and albumin ,on hold   S/p bronch ,large mucus plug was removed   Mucomyst 3 ML 20% inhalation BID  Metaneb  NT suctioning as needed   Chest vest   All abx managed by ID ,stopped   Fluconazole stopped   Watch Hb ,on tube feeding and she is at goal   Defer abx to ID      discussed with  in detail        Care reviewed with nursing staff, medical and surgical specialty care, primary care and the patient's family as available. Chart review/lab review/X-ray viewing/documentation and had long Conversation with patient/family re: prognosis, care options and any end of life issues:      Critical care time spent reviewing labs/films, examining patient, collaborating with other physicians more than 35  Minutes  excluding procedures . Rad Camargo M.D.   7/15/2022  8:24 AM

## 2022-07-15 NOTE — CARE COORDINATION
INTERDISCIPLINARY PLAN OF CARE CONFERENCE    Date/Time: 7/15/2022 9:07 AM  Completed by: JENNIFER Chávez  Case Management      Patient Name:  Hola Mcfarlane  YOB: 1940  Admitting Diagnosis: Shortness of breath [R06.02]  Septicemia ([facility] [A41.9]  Chronic anemia [D64.9]  Acute respiratory failure with hypoxia ([facility] [J96.01]  Aspiration pneumonia of left lower lobe, unspecified aspiration pneumonia type ([facility] [J69.0]  Chronic kidney disease, unspecified CKD stage [N18.9]     Admit Date/Time:  6/17/2022 12:50 AM    Chart reviewed. Interdisciplinary team contacted or reviewed plan related to patient progress and discharge plans. Disciplines included Case Management, Nursing, and Dietitian. Current Status:ongoing   PT/OT recommendation for discharge plan of care: TBD    Expected D/C Disposition:  [facility]  Confirmed plan with patient and/or family Yes confirmed with: (name) pt's  at bedside    Discharge Plan Comments: Chart review completed. Pt remains in ICU on Vent. Pt is awaiting Trach/Peg. Met with pt's  at bedside. He is in agreement with [facility] when appropriate. He is aware if no beds at [facility], pt may have to go to [address] [place] [address] or [facility] which he stated agreement. No questions expressed. Message left for Marylee Abrahams at [facility] to make her aware.      Home O2 in place on admit: Yes    Addendum at 9:16am: Spoke with Marylee Abrahams at [address] [place] [address] via phone call who states will have a bed for pt when ready and is aware they want [facility]

## 2022-07-15 NOTE — ANESTHESIA POSTPROCEDURE EVALUATION
Department of Anesthesiology  Postprocedure Note    Patient: Faye Zhou  MRN: [id number]  YOB: 1940  Date of evaluation: 7/15/2022      Procedure Summary     Date: 07/15/22 Room / Location: [address] [place] [address] / [facility]    Anesthesia Start: 1100 Anesthesia Stop: 2664    Procedure: TRACHEOTOMY WITH PERCUTANEOUS ENDOSCOPIC GASTROSTOMY TUBE PLACEMENT Diagnosis:       Respiratory failure, unspecified chronicity, unspecified whether with hypoxia or hypercapnia (HCC)      (Respiratory failure, unspecified chronicity, unspecified whether with hypoxia or hypercapnia ([facility] [address] [J96.90])    Surgeons: Ria Hall MD Responsible Provider: Fernando Miguel MD    Anesthesia Type: general ASA Status: 4          Anesthesia Type: No value filed.     Chelsea Phase I: Chelsea Score: 9    Chelsea Phase II:      Vitals:    07/15/22 1100 07/15/22 1215 07/15/22 1232 07/15/22 1300   BP: (!) 135/45 (!) 127/46  (!) 145/60   Pulse: 75 73  78   Resp: 14 24  24   Temp:  (!) 100.5 °F (38.1 °C) (!) 100.5 °F (38.1 °C)    TempSrc:  Bladder     SpO2: 95% 96%  92%   Weight:       Height:         Anesthesia Post Evaluation    Patient location during evaluation: bedside  Patient participation: complete - patient cannot participate  Level of consciousness: sedated and ventilated  Airway patency: patent  Nausea & Vomiting: no nausea  Complications: no  Cardiovascular status: hemodynamically stable  Respiratory status: acceptable  Hydration status: euvolemic

## 2022-07-16 ENCOUNTER — APPOINTMENT (OUTPATIENT)
Dept: GENERAL RADIOLOGY | Age: 82
DRG: 004 | End: 2022-07-16
Payer: MEDICARE

## 2022-07-16 LAB
ANION GAP SERPL CALCULATED.3IONS-SCNC: 19 MMOL/L (ref 3–16)
BASOPHILS ABSOLUTE: 0 K/UL (ref 0–0.2)
BASOPHILS RELATIVE PERCENT: 0.4 %
BUN BLDV-MCNC: 43 MG/DL (ref 7–20)
CALCIUM SERPL-MCNC: 9.4 MG/DL (ref 8.3–10.6)
CHLORIDE BLD-SCNC: 96 MMOL/L (ref 99–110)
CO2: 26 MMOL/L (ref 21–32)
CREAT SERPL-MCNC: 3.1 MG/DL (ref 0.6–1.2)
EOSINOPHILS ABSOLUTE: 0.6 K/UL (ref 0–0.6)
EOSINOPHILS RELATIVE PERCENT: 5 %
GFR AFRICAN AMERICAN: 17
GFR NON-AFRICAN AMERICAN: 14
GLUCOSE BLD-MCNC: 71 MG/DL (ref 70–99)
GLUCOSE BLD-MCNC: 77 MG/DL (ref 70–99)
GLUCOSE BLD-MCNC: 78 MG/DL (ref 70–99)
GLUCOSE BLD-MCNC: 80 MG/DL (ref 70–99)
GLUCOSE BLD-MCNC: 83 MG/DL (ref 70–99)
HCT VFR BLD CALC: 23.7 % (ref 36–48)
HEMOGLOBIN: 7.9 G/DL (ref 12–16)
LYMPHOCYTES ABSOLUTE: 0.9 K/UL (ref 1–5.1)
LYMPHOCYTES RELATIVE PERCENT: 7.6 %
MAGNESIUM: 1.7 MG/DL (ref 1.8–2.4)
MCH RBC QN AUTO: 28.5 PG (ref 26–34)
MCHC RBC AUTO-ENTMCNC: 33.1 G/DL (ref 31–36)
MCV RBC AUTO: 85.9 FL (ref 80–100)
MONOCYTES ABSOLUTE: 1 K/UL (ref 0–1.3)
MONOCYTES RELATIVE PERCENT: 8.2 %
NEUTROPHILS ABSOLUTE: 9.3 K/UL (ref 1.7–7.7)
NEUTROPHILS RELATIVE PERCENT: 78.8 %
PDW BLD-RTO: 13.8 % (ref 12.4–15.4)
PERFORMED ON: NORMAL
PHOSPHORUS: 3.4 MG/DL (ref 2.5–4.9)
PLATELET # BLD: 218 K/UL (ref 135–450)
PMV BLD AUTO: 9.1 FL (ref 5–10.5)
POTASSIUM SERPL-SCNC: 3.4 MMOL/L (ref 3.5–5.1)
RBC # BLD: 2.76 M/UL (ref 4–5.2)
SODIUM BLD-SCNC: 141 MMOL/L (ref 136–145)
WBC # BLD: 11.8 K/UL (ref 4–11)

## 2022-07-16 PROCEDURE — 84100 ASSAY OF PHOSPHORUS: CPT

## 2022-07-16 PROCEDURE — 2000000000 HC ICU R&B

## 2022-07-16 PROCEDURE — 94640 AIRWAY INHALATION TREATMENT: CPT

## 2022-07-16 PROCEDURE — 6370000000 HC RX 637 (ALT 250 FOR IP): Performed by: SURGERY

## 2022-07-16 PROCEDURE — 6370000000 HC RX 637 (ALT 250 FOR IP): Performed by: INTERNAL MEDICINE

## 2022-07-16 PROCEDURE — 94669 MECHANICAL CHEST WALL OSCILL: CPT

## 2022-07-16 PROCEDURE — 2580000003 HC RX 258: Performed by: SURGERY

## 2022-07-16 PROCEDURE — C9113 INJ PANTOPRAZOLE SODIUM, VIA: HCPCS | Performed by: SURGERY

## 2022-07-16 PROCEDURE — 94003 VENT MGMT INPAT SUBQ DAY: CPT

## 2022-07-16 PROCEDURE — 6360000002 HC RX W HCPCS: Performed by: INTERNAL MEDICINE

## 2022-07-16 PROCEDURE — 6360000002 HC RX W HCPCS: Performed by: SURGERY

## 2022-07-16 PROCEDURE — 80048 BASIC METABOLIC PNL TOTAL CA: CPT

## 2022-07-16 PROCEDURE — 2700000000 HC OXYGEN THERAPY PER DAY

## 2022-07-16 PROCEDURE — 2500000003 HC RX 250 WO HCPCS: Performed by: INTERNAL MEDICINE

## 2022-07-16 PROCEDURE — 83735 ASSAY OF MAGNESIUM: CPT

## 2022-07-16 PROCEDURE — 2580000003 HC RX 258: Performed by: INTERNAL MEDICINE

## 2022-07-16 PROCEDURE — C9113 INJ PANTOPRAZOLE SODIUM, VIA: HCPCS | Performed by: INTERNAL MEDICINE

## 2022-07-16 PROCEDURE — 85025 COMPLETE CBC W/AUTO DIFF WBC: CPT

## 2022-07-16 PROCEDURE — 94761 N-INVAS EAR/PLS OXIMETRY MLT: CPT

## 2022-07-16 PROCEDURE — 71045 X-RAY EXAM CHEST 1 VIEW: CPT

## 2022-07-16 PROCEDURE — 99233 SBSQ HOSP IP/OBS HIGH 50: CPT | Performed by: INTERNAL MEDICINE

## 2022-07-16 RX ORDER — DIMETHICONE, OXYBENZONE, AND PADIMATE O 2; 2.5; 6.6 G/100G; G/100G; G/100G
STICK TOPICAL
Status: DISPENSED
Start: 2022-07-16 | End: 2022-07-17

## 2022-07-16 RX ADMIN — SODIUM BICARBONATE 650 MG: 650 TABLET ORAL at 07:52

## 2022-07-16 RX ADMIN — PANTOPRAZOLE SODIUM 40 MG: 40 INJECTION, POWDER, LYOPHILIZED, FOR SOLUTION INTRAVENOUS at 07:52

## 2022-07-16 RX ADMIN — CARBIDOPA AND LEVODOPA 1 TABLET: 25; 100 TABLET ORAL at 16:39

## 2022-07-16 RX ADMIN — IPRATROPIUM BROMIDE AND ALBUTEROL SULFATE 1 AMPULE: 2.5; .5 SOLUTION RESPIRATORY (INHALATION) at 23:25

## 2022-07-16 RX ADMIN — POTASSIUM CHLORIDE 20 MEQ: 29.8 INJECTION INTRAVENOUS at 08:46

## 2022-07-16 RX ADMIN — SENNOSIDES AND DOCUSATE SODIUM 1 TABLET: 50; 8.6 TABLET ORAL at 07:55

## 2022-07-16 RX ADMIN — CARBIDOPA AND LEVODOPA 1 TABLET: 25; 100 TABLET ORAL at 21:37

## 2022-07-16 RX ADMIN — ACETYLCYSTEINE 600 MG: 200 INHALANT RESPIRATORY (INHALATION) at 08:38

## 2022-07-16 RX ADMIN — PROPAFENONE HYDROCHLORIDE 150 MG: 150 TABLET, FILM COATED ORAL at 13:23

## 2022-07-16 RX ADMIN — Medication 15 ML: at 21:38

## 2022-07-16 RX ADMIN — IPRATROPIUM BROMIDE AND ALBUTEROL SULFATE 1 AMPULE: 2.5; .5 SOLUTION RESPIRATORY (INHALATION) at 08:37

## 2022-07-16 RX ADMIN — Medication 15 ML: at 07:52

## 2022-07-16 RX ADMIN — PROPAFENONE HYDROCHLORIDE 150 MG: 150 TABLET, FILM COATED ORAL at 06:07

## 2022-07-16 RX ADMIN — CARBIDOPA AND LEVODOPA 1 TABLET: 25; 100 TABLET ORAL at 07:52

## 2022-07-16 RX ADMIN — SENNOSIDES AND DOCUSATE SODIUM 1 TABLET: 50; 8.6 TABLET ORAL at 21:37

## 2022-07-16 RX ADMIN — POTASSIUM CHLORIDE 20 MEQ: 29.8 INJECTION INTRAVENOUS at 09:31

## 2022-07-16 RX ADMIN — IPRATROPIUM BROMIDE AND ALBUTEROL SULFATE 1 AMPULE: 2.5; .5 SOLUTION RESPIRATORY (INHALATION) at 11:20

## 2022-07-16 RX ADMIN — PANTOPRAZOLE SODIUM 40 MG: 40 INJECTION, POWDER, LYOPHILIZED, FOR SOLUTION INTRAVENOUS at 21:38

## 2022-07-16 RX ADMIN — CARBIDOPA AND LEVODOPA 1 TABLET: 25; 100 TABLET ORAL at 12:32

## 2022-07-16 RX ADMIN — SODIUM CHLORIDE, PRESERVATIVE FREE 10 ML: 5 INJECTION INTRAVENOUS at 21:00

## 2022-07-16 RX ADMIN — SODIUM CHLORIDE, PRESERVATIVE FREE 10 ML: 5 INJECTION INTRAVENOUS at 10:17

## 2022-07-16 RX ADMIN — MONTELUKAST SODIUM 10 MG: 10 TABLET, COATED ORAL at 07:52

## 2022-07-16 RX ADMIN — PROPAFENONE HYDROCHLORIDE 150 MG: 150 TABLET, FILM COATED ORAL at 21:37

## 2022-07-16 RX ADMIN — ATORVASTATIN CALCIUM 40 MG: 40 TABLET, FILM COATED ORAL at 21:38

## 2022-07-16 RX ADMIN — ACETYLCYSTEINE 600 MG: 200 INHALANT RESPIRATORY (INHALATION) at 19:47

## 2022-07-16 RX ADMIN — LAMOTRIGINE 50 MG: 25 TABLET ORAL at 10:16

## 2022-07-16 RX ADMIN — Medication 50 MCG/HR: at 02:17

## 2022-07-16 RX ADMIN — IPRATROPIUM BROMIDE AND ALBUTEROL SULFATE 1 AMPULE: 2.5; .5 SOLUTION RESPIRATORY (INHALATION) at 15:41

## 2022-07-16 RX ADMIN — IPRATROPIUM BROMIDE AND ALBUTEROL SULFATE 1 AMPULE: 2.5; .5 SOLUTION RESPIRATORY (INHALATION) at 19:46

## 2022-07-16 ASSESSMENT — PULMONARY FUNCTION TESTS
PIF_VALUE: 31
PIF_VALUE: 35
PIF_VALUE: 38
PIF_VALUE: 37
PIF_VALUE: 30
PIF_VALUE: 39
PIF_VALUE: 32
PIF_VALUE: 34
PIF_VALUE: 30
PIF_VALUE: 32
PIF_VALUE: 30
PIF_VALUE: 34
PIF_VALUE: 31
PIF_VALUE: 29
PIF_VALUE: 30
PIF_VALUE: 34
PIF_VALUE: 34
PIF_VALUE: 31
PIF_VALUE: 32
PIF_VALUE: 33
PIF_VALUE: 33
PIF_VALUE: 30
PIF_VALUE: 32

## 2022-07-16 NOTE — PROGRESS NOTES
07/16/22 0000   RT Protocol   History Pulmonary Disease 2   Respiratory pattern 2   Breath sounds 2   Cough 2   Indications for Bronchodilator Therapy Mucolytic ordered   Bronchodilator Assessment Score 8   RT Inhaler-Nebulizer Bronchodilator Protocol Note    There is a bronchodilator order in the chart from a provider indicating to follow the RT Bronchodilator Protocol and there is an Initiate RT Inhaler-Nebulizer Bronchodilator Protocol order as well (see protocol at bottom of note). CXR Findings:  XR CHEST PORTABLE    Result Date: 7/15/2022  No significant improvement. Moderate diffuse infiltrates and/or congestion identified in the lungs. XR CHEST PORTABLE    Result Date: 7/14/2022  Bilateral pleuroparenchymal disease, increased on the right       The findings from the last RT Protocol Assessment were as follows:   History Pulmonary Disease: Chronic pulmonary disease  Respiratory Pattern: Dyspnea on exertion or RR 21-25 bpm  Breath Sounds: Slightly diminished and/or crackles  Cough: Weak, productive  Indication for Bronchodilator Therapy: Mucolytic ordered  Bronchodilator Assessment Score: 8    Aerosolized bronchodilator medication orders have been revised according to the RT Inhaler-Nebulizer Bronchodilator Protocol below. Respiratory Therapist to perform RT Therapy Protocol Assessment initially then follow the protocol. Repeat RT Therapy Protocol Assessment PRN for score 0-3 or on second treatment, BID, and PRN for scores above 3. No Indications - adjust the frequency to every 6 hours PRN wheezing or bronchospasm, if no treatments needed after 48 hours then discontinue using Per Protocol order mode. If indication present, adjust the RT bronchodilator orders based on the Bronchodilator Assessment Score as indicated below.   Use Inhaler orders unless patient has one or more of the following: on home nebulizer, not able to hold breath for 10 seconds, is not alert and oriented, cannot activate and use MDI correctly, or respiratory rate 25 breaths per minute or more, then use the equivalent nebulizer order(s) with same Frequency and PRN reasons based on the score. If a patient is on this medication at home then do not decrease Frequency below that used at home. 0-3 - enter or revise RT bronchodilator order(s) to equivalent RT Bronchodilator order with Frequency of every 4 hours PRN for wheezing or increased work of breathing using Per Protocol order mode. 4-6 - enter or revise RT Bronchodilator order(s) to two equivalent RT bronchodilator orders with one order with BID Frequency and one order with Frequency of every 4 hours PRN wheezing or increased work of breathing using Per Protocol order mode. 7-10 - enter or revise RT Bronchodilator order(s) to two equivalent RT bronchodilator orders with one order with TID Frequency and one order with Frequency of every 4 hours PRN wheezing or increased work of breathing using Per Protocol order mode. 11-13 - enter or revise RT Bronchodilator order(s) to one equivalent RT bronchodilator order with QID Frequency and an Albuterol order with Frequency of every 4 hours PRN wheezing or increased work of breathing using Per Protocol order mode. Greater than 13 - enter or revise RT Bronchodilator order(s) to one equivalent RT bronchodilator order with every 4 hours Frequency and an Albuterol order with Frequency of every 2 hours PRN wheezing or increased work of breathing using Per Protocol order mode.        Electronically signed by Anupam Parish RCP on 7/16/2022 at 12:31 AM

## 2022-07-16 NOTE — PROGRESS NOTES
Hospitalist Progress Note      PCP: Mac Bloom MD    Date of Admission: 6/17/2022    Hospital Course:   Admitted for resp failure from CHF , pna, and unable to manage own secretions with advanced parkinson's, eventually intubated    Prolonged vent support - there were discussions about trach and PEG support, though able to extubate eventually. Patient was extubated on 6/27/2022. She was then placed on BiPAP. Continued on O2 daytime and BiPAP HS. Transferred out of ICU    Secretions remain a major problem. Resp culture (BAL) growing candida - started antifungal.     Repeat emesis 7/1 and spiking high fever and worsening O2 requirement - she was transferred back to ICU on 7/1 overnight.     Continued on BiPAP    7/7- S.p bronch with clearance of mucous plugs   S/p FEES by SLP team    Transferred out of ICU to PCU on 7/9    Developed worsening resp status and needed bipap and reintubation again 7/13  Underwent bronchoscopy x 2 more times for clearance of secretions    Sp trach and PEG on 7/15    7/16- Mr Christo Virk seen sedated in bed, on vent support via trach  Remains on low dose fentanyl gtt    Fevers improving   BP remains improved         Medications:  Reviewed    Infusion Medications    sodium chloride      dexmedetomidine HCl in NaCl Stopped (07/15/22 1445)    fentaNYL 50 mcg/hr (07/16/22 9568)    norepinephrine Stopped (07/14/22 6800)    sodium chloride      sodium chloride 5 mL/hr at 07/10/22 0712     Scheduled Medications    sennosides-docusate sodium  1 tablet Oral BID    [Held by provider] furosemide  80 mg IntraVENous BID    [Held by provider] heparin (porcine)  5,000 Units SubCUTAneous 3 times per day    potassium bicarb-citric acid  20 mEq Oral Once    sodium bicarbonate  650 mg Oral Daily    acetylcysteine  600 mg Inhalation BID    ipratropium-albuterol  1 ampule Inhalation Q4H While awake    pantoprazole  40 mg IntraVENous BID    chlorhexidine  15 mL Mouth/Throat BID    sodium chloride flush  10 mL IntraVENous 2 times per day    atorvastatin  40 mg Oral Nightly    carbidopa-levodopa  1 tablet Oral 4x Daily    [Held by provider] DULoxetine  60 mg Oral Daily    lamoTRIgine  50 mg Oral BID    montelukast  10 mg Oral Daily    propafenone  150 mg Oral 3 times per day     PRN Meds: sodium chloride, midazolam, fentanNYL, potassium chloride, magnesium sulfate, sodium phosphate IVPB **OR** sodium phosphate IVPB, morphine, carboxymethylcellulose PF, sodium chloride, sodium chloride flush, sodium chloride, promethazine **OR** ondansetron, acetaminophen **OR** acetaminophen, albuterol      Intake/Output Summary (Last 24 hours) at 7/16/2022 0730  Last data filed at 7/16/2022 3809  Gross per 24 hour   Intake 2016.24 ml   Output 1525 ml   Net 491.24 ml         Physical Exam Performed:    BP (!) 134/42   Pulse 95   Temp 99.4 °F (37.4 °C) (Bladder)   Resp 24   Ht 5' 6\" (1.676 m)   Wt 253 lb 8.5 oz (115 kg)   SpO2 92%   BMI 40.92 kg/m²     General: elderly female obese, Sedated and on vent support  Mucous Membranes:  Pink , anicteric  Left opaque cornea  Tracheostomy noted   Neck: No JVD, no carotid bruit, no thyromegaly  Chest: Diminished breath sounds with resolving left sided  rhonchi. No wheezes  Cardiovascular:  RRR S1S2 heard, no murmurs or gallops  Abdomen:  Soft, obese  undistended, non tender, no organomegaly, BS present  PEG noted   Extremities: Left UE picc noted  No edema or cyanosis. Distal pulses well felt  Neurological : Sedated        Labs:   Recent Labs     07/14/22  0540 07/14/22  1815 07/15/22  0425 07/15/22  1322 07/16/22  0513   WBC 10.2  --  10.0  --  11.8*   HGB 7.5*   < > 6.8* 7.7* 7.9*   HCT 22.8*   < > 20.2* 23.8* 23.7*     --  195  --  218    < > = values in this interval not displayed.        Recent Labs     07/14/22  0540 07/15/22  0425    135*   K 3.6 4.0   CL 95* 93*   CO2 28 30   BUN 39* 41*   CREATININE 2.9* 3.1*   CALCIUM 9.6 9.0   PHOS 3.3 3.1       No results for input(s): AST, ALT, BILIDIR, BILITOT, ALKPHOS in the last 72 hours. No results for input(s): INR in the last 72 hours. No results for input(s): Esther Highman in the last 72 hours. Urinalysis:      Lab Results   Component Value Date/Time    NITRU Negative 07/12/2022 01:33 PM    WBCUA 3-5 07/12/2022 01:33 PM    BACTERIA Rare 07/12/2022 01:33 PM    RBCUA 0-2 07/12/2022 01:33 PM    BLOODU TRACE-LYSED 07/12/2022 01:33 PM    SPECGRAV 1.010 07/12/2022 01:33 PM    GLUCOSEU Negative 07/12/2022 01:33 PM       Radiology:  XR CHEST PORTABLE   Final Result   No significant improvement. Moderate diffuse infiltrates and/or congestion   identified in the lungs. XR CHEST PORTABLE   Final Result   Bilateral pleuroparenchymal disease, increased on the right         XR CHEST PORTABLE   Final Result   Interval worsening with increased pneumonia/consolidation left lung and   mild-to-moderate congestion. XR CHEST PORTABLE   Final Result   Lines and tubes in satisfactory position with ill-defined opacifications in   the left lung base and small to moderate size residual left pleural effusion. XR CHEST PORTABLE   Final Result   1. Endotracheal tube terminates in appropriate position above the nirali. The enteric tube courses off the field of view in the upper abdomen. 2. Improved aeration of the left upper lung field with persistent   consolidation and/or effusion opacifying the left mid and lower lung fields. XR CHEST PORTABLE   Final Result   Persistent complete opacification of the left hemithorax. XR CHEST PORTABLE   Final Result   Interval complete opacification of the left hemithorax. CT CHEST WO CONTRAST   Final Result   1. Multifocal bronchopneumonia, with progression since 06/17/2022.   2. Small bilateral pleural effusions. 3. Narrowing of the AP diameter of the trachea. 4. Mild cardiomegaly.          XR CHEST PORTABLE   Final Result   Airspace disease within the mid to lower lung zones is slightly increased as   compared to prior. Possible small left pleural effusion. CT ABDOMEN PELVIS WO CONTRAST Additional Contrast? None   Final Result   1. Nonspecific, nonobstructive gaseous bowel pattern. 2. Extensive diverticulosis of the left colon, lacking acute inflammatory   changes. 3. Bibasilar consolidations, atelectasis versus pneumonia. Pleural effusions   have resolved since 06/23/2022. XR ABDOMEN (KUB) (SINGLE AP VIEW)   Final Result   1. Gaseous distention of the stomach, otherwise, unremarkable bowel gas   pattern. XR CHEST PORTABLE   Final Result   Stable chest.  Persistent left lower lobe opacification with probable   effusion. XR CHEST PORTABLE   Final Result   Persistent left lower lobe opacification and effusion. Interval clearing of   perihilar edema and right basilar opacification and effusion. XR CHEST PORTABLE   Final Result   No significant interval change in bilateral airspace disease and small   effusions. Unchanged positioning of support devices. XR CHEST PORTABLE   Final Result   Increasing basilar opacities with probable small effusions. Unchanged positioning of support devices. XR CHEST PORTABLE   Final Result   1. No significant change. XR CHEST PORTABLE   Final Result   No significant interval change in basilar predominant airspace disease and   small left greater than right pleural effusions. Pulmonary vascular   congestion. CT ABDOMEN PELVIS WO CONTRAST Additional Contrast? None   Final Result   Colonic diverticulosis without acute diverticulitis. Nonspecific rectal wall   thickening. Partially visualized pleural effusions with adjacent compressive   atelectasis. XR CHEST PORTABLE   Final Result   Again identified is bibasilar airspace disease with small pleural effusions   and mild increased right perihilar infiltrates.   This may be related to atelectasis or pneumonia. Cardiomegaly. XR CHEST PORTABLE   Final Result   Stable examination with bilateral small effusions and bibasilar atelectasis. Stable ET tube and enteric tube. XR CHEST PORTABLE   Final Result   No acute process. Bibasilar hypoaeration      Stable cardiomegaly         IR PICC WO SQ PORT/PUMP > 5 YEARS   Final Result      XR CHEST PORTABLE   Final Result   Supportive tubing projects in stable position. Partial regression of atelectasis in the right lung base. Persisting   consolidation or or atelectasis in the left lung base. XR CHEST PORTABLE   Final Result   1. Endotracheal tube terminates in appropriate position above the nirali. 2.  Enteric tube terminates in the body of the stomach. 3.  Basilar opacities again demonstrated, right greater than left. Interval   improved aeration of the left lung base. XR CHEST PORTABLE   Final Result   Increasing opacity in the right base, lobar atelectasis versus accumulating   right pleural effusion. Combination of both entities or underlying pneumonia   also possible. Persisting left basilar airspace disease and small pleural effusion. XR CHEST PORTABLE   Final Result   Hazy bilateral lobe airspace opacities and small left pleural effusion. This   could represent atelectasis or pneumonia in the appropriate clinical setting. CT CHEST WO CONTRAST   Final Result   1. Respiratory motion in the lower lungs. The right base has linear and   dependent atelectasis. The left does have atelectatic changes but could also   have some patchy opacities from pneumonia or aspiration at the left base. 2.  There is no pleural effusion or pneumothorax. 3.  Narrowing of the AP diameter of the trachea with bulging of the   membranous portion due to the gas distended but nondilated esophagus. XR CHEST PORTABLE   Final Result   1.   Bibasilar heterogeneous opacities may represent subsegmental atelectasis   and/or mild pulmonary edema. Underlying infection is difficult to exclude. Short-term follow-up PA and lateral chest radiographs may be helpful for   further evaluation. 2.  Cardiomegaly. Assessment/Plan:    Principal Problem:    Septicemia ([facility] [address]  Active Problems:    Aspiration pneumonia of left lower lobe (HCC)    Severe protein-calorie malnutrition (HCC)    Chronic anemia    Thrush    MRSA colonization    BETSY (obstructive sleep apnea)    Metabolic acidosis    Pulmonary congestion    Mucus plugging of bronchi    Atrial fibrillation, chronic (HCC)    Parkinson disease (HCC)    Acute respiratory failure with hypoxia (HCC)    Chronic kidney disease    Acute kidney injury superimposed on CKD ([facility] [address]  Resolved Problems:    Shortness of breath         Acute Hypox Resp Failure   Aspiration PNA   Treated with  IV Zosyn and vancomycin. Changed Zosyn to cefepime. DCd vancomycin 6/22. Finished 10 days of Vanco and cefepime  Intubated 6/18. Extubated on 6/27/2022.  -On BiPAP. Alternate BiPAP with Airvo. - SLP involved. - resp culture from BAL is growing candida - started diflucan on 7/1/2022   - spiking high fever 7/1 - back on Abx with Vanc and merrem IV.                  - poor physical condition limiting clearance of resp secretions- sp repeat bronch with clearing of mucous on 7/7 , 7/12, 7/13  . On inhaled Mucomyst   eventually needed tracheostomy and PEG placement on 7/15  ID following. Repeat cultures. Hold off on antibiotics for now per ID  Can start weaning vent support and sedation     Sepsis POA - due to above. Recurrent with aspiration  Now off antibiotics  Currently weaned off Levophed    Dysphagia. Abnormal CT  Cough with every meal intake per family report. Underlying Parkinson's. - continues to struggle with secretions. - CT abd pelvis 7/2 - actually fairly unremarkable.              Melena  GI bleed  - drop in H/H   -GI consulted  -H&H held stable.  -Has since resumed eliquis  - IV PPI BID  Drop in H&H.  1 unit of red cells transfused 7/15      FARHEEN on CKD IIIB  -Likely secondary to ATN in the setting of pneumonia, hypotension and elevated vancomycin levels  -Had lasix gtt earlier this admission.  -Back on IV Lasix now, creatinine   Monitor BMP  Hold Lasix  Creatinine worsening. Nephrology consulted  IV Lasix with IV albumin per nephro(held currently by critical care)     Parkinson's disease   - on Sinemet.       Afib Paroxysmal   -Rythmol   -Eliquis held - can resume     Hx of HTN - nifedipine discontinued with hypotension issues  Resume other meds as needed         DVT Prophylaxis: heparin sc - can resume eliquis  Diet: Diet NPO  Code Status: Full Code      Amey Lesch, MD 7/16/2022 7:30 AM

## 2022-07-16 NOTE — PROGRESS NOTES
07/16/22 0340   NICU Vent Information   Vent Type 980   Vent Mode AC/VC   Vt (Set, mL) 355 mL   Vt Exhaled 332 mL   Resp Rate (Set) 24 bmp   Rate Measured 24 br/min   Minute Volume 6.51 Liters   Peak Flow 60 L/min   Pressure Support 0 cmH20   FiO2  50 %   Peak Inspiratory Pressure 33 cmH2O   I:E Ratio 1:2.90   Sensitivity 3   PEEP/CPAP (cmH2O) 8   Mean Airway Pressure 14 cmH20   Cough/Sputum   Sputum How Obtained Tracheal   Cough Productive   Sputum Amount Small   Sputum Color Creamy; Red   Tenacity Thick   Breath Sounds   Right Upper Lobe Diminished   Right Middle Lobe Diminished   Right Lower Lobe Diminished   Left Upper Lobe Diminished   Left Lower Lobe Diminished   Additional Respiratory Assessments   Heart Rate 92   Resp 24   SpO2 95 %   Vent Alarm Settings   High Pressure  45 cmH2O   Low Minute Volume Alarm 4 L/min   Low Exhaled Vt  210 mL   RR High 40 br/min   Apnea (Secs) 20 secs   Ambu Bag With Mask At Bedside Yes   Backup Trachs Available (Size) 6.0;8.0   Patient Observation   Observations han Gill dct

## 2022-07-16 NOTE — PROGRESS NOTES
[facility] Progress Note           Subjective/   80y.o. year old female who we are seeing in consultation for FARHEEN/CKD. HPI:    Status post tracheostomy and PEG tube on 7/15    ROS: No fever or chills. + edema. Social: No visitor    Objective/   GEN:  Chronically ill, BP (!) 126/49   Pulse 96   Temp 99.4 °F (37.4 °C) (Bladder)   Resp 24   Ht 5' 6\" (1.676 m)   Wt 253 lb 8.5 oz (115 kg)   SpO2 93%   BMI 40.92 kg/m²    From 7/4  HEENT: non-icteric, NG tube trachea is  CV: S1, S2 without r/g; + LE edema  RESP: On vent, no wheezing   ABD: +bs, soft, nt, no hsm  SKIN: warm, no rashes    Data/  Recent Labs     07/14/22  0540 07/14/22  1815 07/15/22  0425 07/15/22  1322 07/16/22  0513   WBC 10.2  --  10.0  --  11.8*   HGB 7.5*   < > 6.8* 7.7* 7.9*   HCT 22.8*   < > 20.2* 23.8* 23.7*   MCV 85.3  --  85.7  --  85.9     --  195  --  218    < > = values in this interval not displayed.        Recent Labs     07/14/22  0540 07/15/22  0425 07/16/22  0513    135* 141   K 3.6 4.0 3.4*   CL 95* 93* 96*   CO2 28 30 26   GLUCOSE 130* 110* 78   PHOS 3.3 3.1 3.4   MG 2.00 1.80 1.70*   BUN 39* 41* 43*   CREATININE 2.9* 3.1* 3.1*   LABGLOM 16* 14* 14*   GFRAA 19* 17* 17*       Component      Latest Ref Rng & Units 6/22/2022 6/22/2022           4:10 PM  4:10 PM   Color, UA      Straw/Yellow Yellow    Clarity, UA      Clear Clear    Glucose, UA      Negative mg/dL Negative    Bilirubin, Urine      Negative Negative    Ketones, Urine      Negative mg/dL TRACE (A)    Specific Gravity, UA      1.005 - 1.030 1.025    Blood, Urine      Negative Negative    pH, UA      5.0 - 8.0 5.5    Protein, UA      Negative mg/dL TRACE (A)    Urobilinogen, Urine      <2.0 E.U./dL 0.2    Nitrite, Urine      Negative Negative    Leukocyte Esterase, Urine      Negative Negative    Microscopic Examination       YES    Urine Type       NotGiven    Mucus, UA      None Seen /LPF Rare (A)    WBC, UA      0 - 5 /HPF 10-20 (A)    RBC, UA      0 - 4 /HPF 3-4    Epithelial Cells, UA      0 - 5 /HPF 0-1    Bacteria, UA      None Seen /HPF 2+ (A)    Sodium, Ur      Not Established mmol/L  <20   Potassium, Ur      Not Established mmol/L  26.3   Chloride      Not Established mmol/L  <20       Assessment/     - Acute kidney injury - multi-factorial ATN injury in setting of aspiration PNA, hypotension   Volume overloaded    Non-oliguric and responding to IV lasix, BP stable at this point    Creatinine stabilized around 3.1    - Chronic kidney disease stage 3b - background DM/HTN/CHF              SCr 1.6              Follows with [facility]     - Acute hypoxemic respiratory failure/Aspiration PNA/Pulmonary edema    On vent, improved FiO2 requirements over the last 24 hours   S/p bronch    Planning for trach      - Diastolic CHF     - Atrial fibrillation    - Anemia - prn prbc's     - Hypertension   Range is now low but stable    - Hypokalemia, improved. -Metabolic acidosis, resolved    -Dysphagia, patient has been started on some puréed food    Plan/     -Okay to resume Lasix 40 mg IV twice daily when okay with pulmonary  -Replace potassium as ordered  -Follow labs  -Hemodynamic support   -Monitor in/outs   -Renal dose medications and avoid nephrotoxins  ____________________________________  Brian Marti MD  [facility]  [email or web address]  Office: [phone]

## 2022-07-16 NOTE — PROGRESS NOTES
07/16/22 1900   RT Protocol   History Pulmonary Disease 2   Respiratory pattern 2   Breath sounds 2   Cough 2   Indications for Bronchodilator Therapy Mucolytic ordered   Bronchodilator Assessment Score 8   RT Inhaler-Nebulizer Bronchodilator Protocol Note    There is a bronchodilator order in the chart from a provider indicating to follow the RT Bronchodilator Protocol and there is an Initiate RT Inhaler-Nebulizer Bronchodilator Protocol order as well (see protocol at bottom of note). CXR Findings:  XR CHEST PORTABLE    Result Date: 7/16/2022  Stable examination with perihilar airspace opacities and bilateral left greater right pleural effusions that may represent pneumonia or pulmonary edema. Removal of ET tube and enteric tube with placement of tracheostomy tube. Otherwise stable exam.     XR CHEST PORTABLE    Result Date: 7/15/2022  No significant improvement. Moderate diffuse infiltrates and/or congestion identified in the lungs. The findings from the last RT Protocol Assessment were as follows:   History Pulmonary Disease: Chronic pulmonary disease  Respiratory Pattern: Dyspnea on exertion or RR 21-25 bpm  Breath Sounds: Slightly diminished and/or crackles  Cough: Weak, productive  Indication for Bronchodilator Therapy: Mucolytic ordered  Bronchodilator Assessment Score: 8    Aerosolized bronchodilator medication orders have been revised according to the RT Inhaler-Nebulizer Bronchodilator Protocol below. Respiratory Therapist to perform RT Therapy Protocol Assessment initially then follow the protocol. Repeat RT Therapy Protocol Assessment PRN for score 0-3 or on second treatment, BID, and PRN for scores above 3. No Indications - adjust the frequency to every 6 hours PRN wheezing or bronchospasm, if no treatments needed after 48 hours then discontinue using Per Protocol order mode.      If indication present, adjust the RT bronchodilator orders based on the Bronchodilator Assessment Score as indicated below. Use Inhaler orders unless patient has one or more of the following: on home nebulizer, not able to hold breath for 10 seconds, is not alert and oriented, cannot activate and use MDI correctly, or respiratory rate 25 breaths per minute or more, then use the equivalent nebulizer order(s) with same Frequency and PRN reasons based on the score. If a patient is on this medication at home then do not decrease Frequency below that used at home. 0-3 - enter or revise RT bronchodilator order(s) to equivalent RT Bronchodilator order with Frequency of every 4 hours PRN for wheezing or increased work of breathing using Per Protocol order mode. 4-6 - enter or revise RT Bronchodilator order(s) to two equivalent RT bronchodilator orders with one order with BID Frequency and one order with Frequency of every 4 hours PRN wheezing or increased work of breathing using Per Protocol order mode. 7-10 - enter or revise RT Bronchodilator order(s) to two equivalent RT bronchodilator orders with one order with TID Frequency and one order with Frequency of every 4 hours PRN wheezing or increased work of breathing using Per Protocol order mode. 11-13 - enter or revise RT Bronchodilator order(s) to one equivalent RT bronchodilator order with QID Frequency and an Albuterol order with Frequency of every 4 hours PRN wheezing or increased work of breathing using Per Protocol order mode. Greater than 13 - enter or revise RT Bronchodilator order(s) to one equivalent RT bronchodilator order with every 4 hours Frequency and an Albuterol order with Frequency of every 2 hours PRN wheezing or increased work of breathing using Per Protocol order mode.        Electronically signed by Wanda Kerr RCP on 7/16/2022 at 7:54 PM

## 2022-07-16 NOTE — PROGRESS NOTES
Patient awake, responds to voice, remains on ventilator, rr 24/355/8/50%,  at bedside, assessment completed, reposition patient, awaiting care rounds.

## 2022-07-16 NOTE — PROGRESS NOTES
07/16/22 1948   NICU Vent Information   Vent Type 980   Vent Mode AC/VC   Vt (Set, mL) 355 mL   Vt Exhaled 341 mL   Resp Rate (Set) 24 bmp   Rate Measured 24 br/min   Minute Volume 8.22 Liters   Peak Flow 60 L/min   Pressure Support 0 cmH20   FiO2  50 %   Peak Inspiratory Pressure 32 cmH2O   I:E Ratio 1:2.90   Sensitivity 3   PEEP/CPAP (cmH2O) 8   Mean Airway Pressure 15 cmH20   Plateau Pressure 29 EVB59   Static Compliance 16 mL/cmH2O   Dynamic Compliance 12 mL/cmH2O   Cough/Sputum   Sputum How Obtained Tracheal   Cough Productive   Sputum Amount Small   Sputum Color Creamy   Tenacity Thick   Breath Sounds   Right Upper Lobe Diminished   Right Middle Lobe Diminished   Right Lower Lobe Diminished   Left Upper Lobe Diminished   Left Lower Lobe Diminished   Additional Respiratory Assessments   Heart Rate 93   Resp 24   SpO2 93 %   Position Semi-Mccollum's   Humidification Source Heated wire   Humidification Temp 36.9   Circuit Condensation Drained   Vent Alarm Settings   High Pressure  45 cmH2O   Low Minute Volume Alarm 4 L/min   Low Exhaled Vt  210 mL   RR High 40 br/min   Apnea (Secs) 20 secs   Ambu Bag With Mask At Bedside Yes   Backup Trachs Available (Size) 6.0;8.0   Patient Observation   Observations han 8 dct

## 2022-07-16 NOTE — PROGRESS NOTES
RT Inhaler-Nebulizer Bronchodilator Protocol Note    There is a bronchodilator order in the chart from a provider indicating to follow the RT Bronchodilator Protocol and there is an Initiate RT Inhaler-Nebulizer Bronchodilator Protocol order as well (see protocol at bottom of note). CXR Findings:  XR CHEST PORTABLE    Result Date: 7/15/2022  No significant improvement. Moderate diffuse infiltrates and/or congestion identified in the lungs. The findings from the last RT Protocol Assessment were as follows:   History Pulmonary Disease: (P) Chronic pulmonary disease  Respiratory Pattern: (P) Dyspnea on exertion or RR 21-25 bpm  Breath Sounds: (P) Slightly diminished and/or crackles  Cough: (P) Weak, productive  Indication for Bronchodilator Therapy: (P) Mucolytic ordered  Bronchodilator Assessment Score: (P) 8    Aerosolized bronchodilator medication orders have been revised according to the RT Inhaler-Nebulizer Bronchodilator Protocol below. Respiratory Therapist to perform RT Therapy Protocol Assessment initially then follow the protocol. Repeat RT Therapy Protocol Assessment PRN for score 0-3 or on second treatment, BID, and PRN for scores above 3. No Indications - adjust the frequency to every 6 hours PRN wheezing or bronchospasm, if no treatments needed after 48 hours then discontinue using Per Protocol order mode. If indication present, adjust the RT bronchodilator orders based on the Bronchodilator Assessment Score as indicated below. Use Inhaler orders unless patient has one or more of the following: on home nebulizer, not able to hold breath for 10 seconds, is not alert and oriented, cannot activate and use MDI correctly, or respiratory rate 25 breaths per minute or more, then use the equivalent nebulizer order(s) with same Frequency and PRN reasons based on the score. If a patient is on this medication at home then do not decrease Frequency below that used at home.     0-3 - enter or revise RT bronchodilator order(s) to equivalent RT Bronchodilator order with Frequency of every 4 hours PRN for wheezing or increased work of breathing using Per Protocol order mode. 4-6 - enter or revise RT Bronchodilator order(s) to two equivalent RT bronchodilator orders with one order with BID Frequency and one order with Frequency of every 4 hours PRN wheezing or increased work of breathing using Per Protocol order mode. 7-10 - enter or revise RT Bronchodilator order(s) to two equivalent RT bronchodilator orders with one order with TID Frequency and one order with Frequency of every 4 hours PRN wheezing or increased work of breathing using Per Protocol order mode. 11-13 - enter or revise RT Bronchodilator order(s) to one equivalent RT bronchodilator order with QID Frequency and an Albuterol order with Frequency of every 4 hours PRN wheezing or increased work of breathing using Per Protocol order mode. Greater than 13 - enter or revise RT Bronchodilator order(s) to one equivalent RT bronchodilator order with every 4 hours Frequency and an Albuterol order with Frequency of every 2 hours PRN wheezing or increased work of breathing using Per Protocol order mode.        Electronically signed by Ruy Brown RCP on 7/16/2022 at 8:42 AM

## 2022-07-16 NOTE — PROGRESS NOTES
Pulmonary & Critical Care Medicine ICU Progress Note    CC: shortness of breath     Events of Last 24 hours:   Patient still on mechanical ventilation with   355/24/10/50   Off pressors  Status post trach with no issues she has been doing well more awake    On fentanyl 25 now     at bedside    Vascular lines: IV: PICC 6/19/2022 P      Intake/Output Summary (Last 24 hours) at 7/16/2022 1305  Last data filed at 7/16/2022 1157  Gross per 24 hour   Intake 1914.85 ml   Output 1175 ml   Net 739.85 ml           MV: 6/18/2022 -6/27/22  Vent Mode: AC/VC Resp Rate (Set): 24 bmp/Vt (Set, mL): 355 mL/ /FiO2 : 50 %  Recent Labs     07/15/22  0430   PHART 7.525*   SDD7VFD 37.8   PO2ART 85.1         IV:   sodium chloride      dexmedetomidine HCl in NaCl Stopped (07/15/22 1445)    fentaNYL 25 mcg/hr (07/16/22 1057)    norepinephrine Stopped (07/14/22 2144)    sodium chloride      sodium chloride 5 mL/hr at 07/10/22 0747       Vitals:  Blood pressure (!) 126/49, pulse 96, temperature 99.7 °F (37.6 °C), temperature source Bladder, resp. rate 24, height 5' 6\" (1.676 m), weight 253 lb 8.5 oz (115 kg), SpO2 93 %, not currently breastfeeding. On  5 L  Constitutional: I has a trach now minimal sedation HENT:  Oropharynx is clear and moist.   Neck: Has a trach  Cardiovascular: Normal heart sounds. No lower extremity edema. Pulmonary/Chest: no breath sound left side   Musculoskeletal: No cyanosis. No clubbing. Skin: Skin is warm and dry. Psychiatric: Normal mood and affect.   Neurologic: Debated and sedated         Scheduled Meds:   sennosides-docusate sodium  1 tablet Oral BID    [Held by provider] furosemide  80 mg IntraVENous BID    [Held by provider] heparin (porcine)  5,000 Units SubCUTAneous 3 times per day    potassium bicarb-citric acid  20 mEq Oral Once    sodium bicarbonate  650 mg Oral Daily    acetylcysteine  600 mg Inhalation BID    ipratropium-albuterol  1 ampule Inhalation Q4H While awake    pantoprazole  40 mg IntraVENous BID    chlorhexidine  15 mL Mouth/Throat BID    sodium chloride flush  10 mL IntraVENous 2 times per day    atorvastatin  40 mg Oral Nightly    carbidopa-levodopa  1 tablet Oral 4x Daily    [Held by provider] DULoxetine  60 mg Oral Daily    lamoTRIgine  50 mg Oral BID    montelukast  10 mg Oral Daily    propafenone  150 mg Oral 3 times per day       Data:  CBC:   Recent Labs     07/14/22  0540 07/14/22  1815 07/15/22  0425 07/15/22  1322 07/16/22  0513   WBC 10.2  --  10.0  --  11.8*   HGB 7.5*   < > 6.8* 7.7* 7.9*   HCT 22.8*   < > 20.2* 23.8* 23.7*   MCV 85.3  --  85.7  --  85.9     --  195  --  218    < > = values in this interval not displayed. BMP:   Recent Labs     07/14/22  0540 07/15/22  0425 07/16/22  0513    135* 141   K 3.6 4.0 3.4*   CL 95* 93* 96*   CO2 28 30 26   PHOS 3.3 3.1 3.4   BUN 39* 41* 43*   CREATININE 2.9* 3.1* 3.1*       LIVER PROFILE:   No results for input(s): AST, ALT, LIPASE, BILIDIR, BILITOT, ALKPHOS in the last 72 hours. Invalid input(s): AMYLASE,  ALB    Microbiology:  6/17/2022 SARS-CoV-2 and influenza are negative  6/17/2022 MRSA DNA screen positive  6/17/2022 blood NG  6/17/2022 sputum NRF  6/18/2022 tracheal aspirate NRF  6/19/2022 BAL Candida  6/19/2022 respiratory viral panel negative  7/1/22 Blood cx NGTD  7/7/2022 bronchial washings and BAL Candida krusei      Imaging:  CT chest 7/3/22  images reviewed by me and showed:   1. Multifocal bronchopneumonia, with progression since 06/17/2022.   2. Small bilateral pleural effusions  3. Narrowing of the AP diameter of the trachea  4.  Mild cardiomegaly    ASSESSMENT:  Acute hypoxemic respiratory failure, initially on vent, extubated but then returned to ICU and required BiPAP  Aspiration pneumonia - recurrent with septic shock mucous plugging of bronchi on bronchoscopy 7/7/2022  Tracheobronchomalacia on bronchoscopy 7/7/2022 -  perhaps limiting airway clearance  BETSY on BiPAP 22/15 with 1 L bleed in Parkinson's disease  Atrial fibrillation  Acute on chronic kidney failure, baseline creatinine 1.6  Acute on chronic anemia, s/p 2 U PRBC 6/23/2022  H/O esophagitis  Pleural effusions are small bilaterally  Bloody sputum    PLAN:    For trach today? peg tube   To receive 1 unit RBC   Will decide about anticoagulation when hemoglobin more stable and also if we are going to do thoracentesis  Off anticogulation as she has mild to bleed hemoglobin stable at 7.9 she received 1 unit  Stool ocult blood   Received Lasix and albumin ,on hold   S/p bronch ,large mucus plug was removed   Mucomyst 3 ML 20% inhalation BID  Metaneb  NT suctioning as needed   Chest vest   All abx managed by ID ,stopped   Fluconazole stopped   Watch Hb ,on tube feeding and she is at goal   Defer abx to ID      discussed with  in detail        Care reviewed with nursing staff, medical and surgical specialty care, primary care and the patient's family as available. Chart review/lab review/X-ray viewing/documentation and had long Conversation with patient/family re: prognosis, care options and any end of life issues:      Critical care time spent reviewing labs/films, examining patient, collaborating with other physicians more than 35  Minutes  excluding procedures . Matt Fierro M.D.   7/16/2022  1:05 PM

## 2022-07-16 NOTE — PLAN OF CARE
Problem: Discharge Planning  Goal: Discharge to home or other facility with appropriate resources  7/15/2022 2156 by Dominic Shirley RN  Outcome: Progressing  7/15/2022 2155 by Dominic Shirley RN  Outcome: Progressing  Flowsheets (Taken 7/15/2022 2000)  Discharge to home or other facility with appropriate resources: Identify barriers to discharge with patient and caregiver     Problem: Pain  Goal: Verbalizes/displays adequate comfort level or baseline comfort level  7/15/2022 2156 by Dominic Shirley RN  Outcome: Progressing  7/15/2022 2155 by Dominic Shirley RN  Outcome: Progressing  Flowsheets (Taken 7/15/2022 2000)  Verbalizes/displays adequate comfort level or baseline comfort level:   Encourage patient to monitor pain and request assistance   Assess pain using appropriate pain scale   Administer analgesics based on type and severity of pain and evaluate response   Implement non-pharmacological measures as appropriate and evaluate response     Problem: ABCDS Injury Assessment  Goal: Absence of physical injury  7/15/2022 2156 by Dominic Shirley RN  Outcome: Progressing  7/15/2022 2155 by Dominic Shirley RN  Outcome: Progressing     Problem: Skin/Tissue Integrity  Goal: Absence of new skin breakdown  Description: 1. Monitor for areas of redness and/or skin breakdown  2. Assess vascular access sites hourly  3. Every 4-6 hours minimum:  Change oxygen saturation probe site  4. Every 4-6 hours:  If on nasal continuous positive airway pressure, respiratory therapy assess nares and determine need for appliance change or resting period.   7/15/2022 2156 by Dominic Shirley RN  Outcome: Progressing  7/15/2022 2155 by Dominic Shirley RN  Outcome: Progressing     Problem: Chronic Conditions and Co-morbidities  Goal: Patient's chronic conditions and co-morbidity symptoms are monitored and maintained or improved  7/15/2022 2156 by Coty Gunter RN  Outcome: Progressing  7/15/2022 2155 by Coty Gunter RN  Outcome: Progressing  Flowsheets (Taken 7/15/2022 2000)  Care Plan - Patient's Chronic Conditions and Co-Morbidity Symptoms are Monitored and Maintained or Improved: Monitor and assess patient's chronic conditions and comorbid symptoms for stability, deterioration, or improvement     Problem: Safety - Adult  Goal: Free from fall injury  7/15/2022 2156 by Coty Gunter RN  Outcome: Progressing  7/15/2022 2155 by Coty Gunter RN  Outcome: Progressing     Problem: Nutrition Deficit:  Goal: Optimize nutritional status  7/15/2022 2156 by Coty Gunter RN  Outcome: Progressing  7/15/2022 2155 by Coty Gunter RN  Outcome: Progressing  Flowsheets (Taken 7/15/2022 1052 by Jung Mercer, RD, LD)  Nutrient intake appropriate for improving, restoring, or maintaining nutritional needs:   Assess nutritional status and recommend course of action   Recommend, monitor, and adjust tube feedings and TPN/PPN based on assessed needs     Problem: Respiratory - Adult  Goal: Achieves optimal ventilation and oxygenation  7/15/2022 2156 by Coty Gunter RN  Outcome: Progressing  7/15/2022 2155 by Coty Gunter RN  Outcome: Progressing  Flowsheets (Taken 7/15/2022 2000)  Achieves optimal ventilation and oxygenation:   Assess for changes in respiratory status   Assess for changes in mentation and behavior   Position to facilitate oxygenation and minimize respiratory effort   Oxygen supplementation based on oxygen saturation or arterial blood gases     Problem: Cardiovascular - Adult  Goal: Maintains optimal cardiac output and hemodynamic stability  7/15/2022 2156 by Coty Gunter RN  Outcome: Progressing  7/15/2022 2155 by Coty Gunter RN  Outcome: Progressing  Flowsheets (Taken 7/15/2022 2000)  Maintains optimal cardiac output and hemodynamic stability:   Monitor blood pressure and heart rate   Monitor urine output and notify Licensed Independent Practitioner for values outside of normal range   Assess for signs of decreased cardiac output  Goal: Absence of cardiac dysrhythmias or at baseline  7/15/2022 2156 by Terence Jenkins RN  Outcome: Progressing  7/15/2022 2155 by Terence Jenkins RN  Outcome: Progressing  Flowsheets (Taken 7/15/2022 2000)  Absence of cardiac dysrhythmias or at baseline:   Monitor cardiac rate and rhythm   Assess for signs of decreased cardiac output     Problem: Skin/Tissue Integrity - Adult  Goal: Skin integrity remains intact  7/15/2022 2156 by Terence Jenkins RN  Outcome: Progressing  7/15/2022 2155 by Terence Jenkins RN  Outcome: Progressing  Flowsheets (Taken 7/15/2022 2000)  Skin Integrity Remains Intact: Monitor for areas of redness and/or skin breakdown  Goal: Oral mucous membranes remain intact  7/15/2022 2156 by Terence Jenkins RN  Outcome: Progressing  7/15/2022 2155 by Terence Jenkins RN  Outcome: Progressing  Flowsheets (Taken 7/15/2022 2000)  Oral Mucous Membranes Remain Intact: Assess oral mucosa and hygiene practices     Problem: Musculoskeletal - Adult  Goal: Return mobility to safest level of function  7/15/2022 2156 by Terence Jenkins RN  Outcome: Progressing  7/15/2022 2155 by Terence Jenkins RN  Outcome: Progressing     Problem: Gastrointestinal - Adult  Goal: Minimal or absence of nausea and vomiting  7/15/2022 2156 by Terence Jenkins RN  Outcome: Progressing  7/15/2022 2155 by Terence Jenkins RN  Outcome: Progressing  Flowsheets (Taken 7/15/2022 2000)  Minimal or absence of nausea and vomiting:   Administer IV fluids as ordered to ensure adequate hydration   Maintain NPO status until nausea and vomiting are resolved   Nasogastric tube to low intermittent suction as ordered   Administer less restrictive measures have been tried or would not be effective before applying the restraint   Evaluate the patient's condition at the time of restraint application   Inform patient/family regarding the reason for restraint   Every 2 hours: Monitor safety, psychosocial status, comfort, nutrition and hydration     Pt resting in bed, alert and able to follow directions, able to nod head yes/no to simple questions. Attempted to review with pt plan of care for shift and medications, pt tolerating vent settings, IV fentanyl infusing, archuleta patent, peg patent to medications, no s/s of bleeding, pt denies pain.

## 2022-07-16 NOTE — PROGRESS NOTES
07/15/22 2307   NICU Vent Information   Vent Type 980   Vent Mode AC/VC   Vt (Set, mL) 355 mL   Vt Exhaled 372 mL   Resp Rate (Set) 24 bmp   Rate Measured 25 br/min   Minute Volume 8.68 Liters   Peak Flow 60 L/min   Pressure Support 0 cmH20   FiO2  50 %   Peak Inspiratory Pressure 36 cmH2O   I:E Ratio 1:2.90   Sensitivity 3   PEEP/CPAP (cmH2O) 8   Mean Airway Pressure 16 cmH20   Cough/Sputum   Sputum How Obtained Tracheal   Cough Productive   Sputum Amount Small   Sputum Color Red   Tenacity Thick   Breath Sounds   Right Upper Lobe Diminished   Right Middle Lobe Diminished   Right Lower Lobe Diminished   Left Upper Lobe Diminished   Left Lower Lobe Diminished   Additional Respiratory Assessments   Heart Rate 93   Resp 25   SpO2 100 %   Vent Alarm Settings   High Pressure  45 cmH2O   Low Minute Volume Alarm 4 L/min   Low Exhaled Vt  210 mL   RR High 40 br/min   Apnea (Secs) 20 secs   Ambu Bag With Mask At Bedside Yes   Backup Trachs Available (Size) 6.0;8.0   Patient Observation   Observations han Gill dct

## 2022-07-16 NOTE — PROGRESS NOTES
Trach and PEG in place, functioning well, no concerns from the Care team.    No further surgical recommendations at this time. Will sign off.     DTW

## 2022-07-17 LAB
ANION GAP SERPL CALCULATED.3IONS-SCNC: 16 MMOL/L (ref 3–16)
BASOPHILS ABSOLUTE: 0 K/UL (ref 0–0.2)
BASOPHILS RELATIVE PERCENT: 0.4 %
BUN BLDV-MCNC: 45 MG/DL (ref 7–20)
CALCIUM SERPL-MCNC: 9.6 MG/DL (ref 8.3–10.6)
CHLORIDE BLD-SCNC: 94 MMOL/L (ref 99–110)
CO2: 27 MMOL/L (ref 21–32)
CREAT SERPL-MCNC: 3.1 MG/DL (ref 0.6–1.2)
EOSINOPHILS ABSOLUTE: 0.8 K/UL (ref 0–0.6)
EOSINOPHILS RELATIVE PERCENT: 7.4 %
GFR AFRICAN AMERICAN: 17
GFR NON-AFRICAN AMERICAN: 14
GLUCOSE BLD-MCNC: 104 MG/DL (ref 70–99)
GLUCOSE BLD-MCNC: 105 MG/DL (ref 70–99)
GLUCOSE BLD-MCNC: 112 MG/DL (ref 70–99)
GLUCOSE BLD-MCNC: 120 MG/DL (ref 70–99)
HCT VFR BLD CALC: 24.6 % (ref 36–48)
HEMOGLOBIN: 8 G/DL (ref 12–16)
LYMPHOCYTES ABSOLUTE: 1 K/UL (ref 1–5.1)
LYMPHOCYTES RELATIVE PERCENT: 9.8 %
MAGNESIUM: 1.7 MG/DL (ref 1.8–2.4)
MCH RBC QN AUTO: 28.1 PG (ref 26–34)
MCHC RBC AUTO-ENTMCNC: 32.6 G/DL (ref 31–36)
MCV RBC AUTO: 86.4 FL (ref 80–100)
MONOCYTES ABSOLUTE: 0.9 K/UL (ref 0–1.3)
MONOCYTES RELATIVE PERCENT: 9.1 %
NEUTROPHILS ABSOLUTE: 7.4 K/UL (ref 1.7–7.7)
NEUTROPHILS RELATIVE PERCENT: 73.3 %
PDW BLD-RTO: 13.8 % (ref 12.4–15.4)
PERFORMED ON: ABNORMAL
PHOSPHORUS: 4 MG/DL (ref 2.5–4.9)
PLATELET # BLD: 278 K/UL (ref 135–450)
PMV BLD AUTO: 9.3 FL (ref 5–10.5)
POTASSIUM SERPL-SCNC: 3.6 MMOL/L (ref 3.5–5.1)
RBC # BLD: 2.85 M/UL (ref 4–5.2)
SODIUM BLD-SCNC: 137 MMOL/L (ref 136–145)
WBC # BLD: 10.2 K/UL (ref 4–11)

## 2022-07-17 PROCEDURE — 6370000000 HC RX 637 (ALT 250 FOR IP): Performed by: SURGERY

## 2022-07-17 PROCEDURE — 2580000003 HC RX 258: Performed by: SURGERY

## 2022-07-17 PROCEDURE — 99291 CRITICAL CARE FIRST HOUR: CPT | Performed by: INTERNAL MEDICINE

## 2022-07-17 PROCEDURE — 94669 MECHANICAL CHEST WALL OSCILL: CPT

## 2022-07-17 PROCEDURE — 94003 VENT MGMT INPAT SUBQ DAY: CPT

## 2022-07-17 PROCEDURE — 94640 AIRWAY INHALATION TREATMENT: CPT

## 2022-07-17 PROCEDURE — 80048 BASIC METABOLIC PNL TOTAL CA: CPT

## 2022-07-17 PROCEDURE — 83735 ASSAY OF MAGNESIUM: CPT

## 2022-07-17 PROCEDURE — 2000000000 HC ICU R&B

## 2022-07-17 PROCEDURE — 94761 N-INVAS EAR/PLS OXIMETRY MLT: CPT

## 2022-07-17 PROCEDURE — 2700000000 HC OXYGEN THERAPY PER DAY

## 2022-07-17 PROCEDURE — 2500000003 HC RX 250 WO HCPCS: Performed by: SURGERY

## 2022-07-17 PROCEDURE — 84100 ASSAY OF PHOSPHORUS: CPT

## 2022-07-17 PROCEDURE — 6360000002 HC RX W HCPCS: Performed by: SURGERY

## 2022-07-17 PROCEDURE — 85025 COMPLETE CBC W/AUTO DIFF WBC: CPT

## 2022-07-17 PROCEDURE — C9113 INJ PANTOPRAZOLE SODIUM, VIA: HCPCS | Performed by: SURGERY

## 2022-07-17 RX ORDER — FUROSEMIDE 10 MG/ML
40 INJECTION INTRAMUSCULAR; INTRAVENOUS DAILY
Status: DISCONTINUED | OUTPATIENT
Start: 2022-07-18 | End: 2022-07-19

## 2022-07-17 RX ADMIN — MONTELUKAST SODIUM 10 MG: 10 TABLET, COATED ORAL at 07:52

## 2022-07-17 RX ADMIN — CARBIDOPA AND LEVODOPA 1 TABLET: 25; 100 TABLET ORAL at 07:52

## 2022-07-17 RX ADMIN — PANTOPRAZOLE SODIUM 40 MG: 40 INJECTION, POWDER, LYOPHILIZED, FOR SOLUTION INTRAVENOUS at 07:52

## 2022-07-17 RX ADMIN — LAMOTRIGINE 50 MG: 25 TABLET ORAL at 07:51

## 2022-07-17 RX ADMIN — ACETYLCYSTEINE 600 MG: 200 INHALANT RESPIRATORY (INHALATION) at 19:50

## 2022-07-17 RX ADMIN — PROPAFENONE HYDROCHLORIDE 150 MG: 150 TABLET, FILM COATED ORAL at 13:13

## 2022-07-17 RX ADMIN — ATORVASTATIN CALCIUM 40 MG: 40 TABLET, FILM COATED ORAL at 20:19

## 2022-07-17 RX ADMIN — PROPAFENONE HYDROCHLORIDE 150 MG: 150 TABLET, FILM COATED ORAL at 20:19

## 2022-07-17 RX ADMIN — SODIUM BICARBONATE 650 MG: 650 TABLET ORAL at 07:52

## 2022-07-17 RX ADMIN — IPRATROPIUM BROMIDE AND ALBUTEROL SULFATE 1 AMPULE: 2.5; .5 SOLUTION RESPIRATORY (INHALATION) at 16:00

## 2022-07-17 RX ADMIN — IPRATROPIUM BROMIDE AND ALBUTEROL SULFATE 1 AMPULE: 2.5; .5 SOLUTION RESPIRATORY (INHALATION) at 19:50

## 2022-07-17 RX ADMIN — PANTOPRAZOLE SODIUM 40 MG: 40 INJECTION, POWDER, LYOPHILIZED, FOR SOLUTION INTRAVENOUS at 20:20

## 2022-07-17 RX ADMIN — LAMOTRIGINE 50 MG: 25 TABLET ORAL at 01:44

## 2022-07-17 RX ADMIN — SENNOSIDES AND DOCUSATE SODIUM 1 TABLET: 50; 8.6 TABLET ORAL at 07:52

## 2022-07-17 RX ADMIN — CARBIDOPA AND LEVODOPA 1 TABLET: 25; 100 TABLET ORAL at 13:13

## 2022-07-17 RX ADMIN — CARBIDOPA AND LEVODOPA 1 TABLET: 25; 100 TABLET ORAL at 20:18

## 2022-07-17 RX ADMIN — SENNOSIDES AND DOCUSATE SODIUM 1 TABLET: 50; 8.6 TABLET ORAL at 20:18

## 2022-07-17 RX ADMIN — IPRATROPIUM BROMIDE AND ALBUTEROL SULFATE 1 AMPULE: 2.5; .5 SOLUTION RESPIRATORY (INHALATION) at 08:16

## 2022-07-17 RX ADMIN — CARBIDOPA AND LEVODOPA 1 TABLET: 25; 100 TABLET ORAL at 16:41

## 2022-07-17 RX ADMIN — Medication 15 ML: at 20:20

## 2022-07-17 RX ADMIN — LAMOTRIGINE 50 MG: 25 TABLET ORAL at 20:18

## 2022-07-17 RX ADMIN — Medication 25 MCG/HR: at 04:29

## 2022-07-17 RX ADMIN — IPRATROPIUM BROMIDE AND ALBUTEROL SULFATE 1 AMPULE: 2.5; .5 SOLUTION RESPIRATORY (INHALATION) at 23:25

## 2022-07-17 RX ADMIN — ACETYLCYSTEINE 600 MG: 200 INHALANT RESPIRATORY (INHALATION) at 08:16

## 2022-07-17 RX ADMIN — PROPAFENONE HYDROCHLORIDE 150 MG: 150 TABLET, FILM COATED ORAL at 06:48

## 2022-07-17 RX ADMIN — Medication 15 ML: at 07:52

## 2022-07-17 RX ADMIN — SODIUM CHLORIDE, PRESERVATIVE FREE 10 ML: 5 INJECTION INTRAVENOUS at 20:20

## 2022-07-17 RX ADMIN — SODIUM CHLORIDE, PRESERVATIVE FREE 10 ML: 5 INJECTION INTRAVENOUS at 07:53

## 2022-07-17 RX ADMIN — IPRATROPIUM BROMIDE AND ALBUTEROL SULFATE 1 AMPULE: 2.5; .5 SOLUTION RESPIRATORY (INHALATION) at 11:32

## 2022-07-17 ASSESSMENT — PULMONARY FUNCTION TESTS
PIF_VALUE: 34
PIF_VALUE: 34
PIF_VALUE: 33
PIF_VALUE: 34
PIF_VALUE: 32
PIF_VALUE: 33
PIF_VALUE: 39
PIF_VALUE: 34
PIF_VALUE: 34
PIF_VALUE: 32
PIF_VALUE: 33
PIF_VALUE: 38
PIF_VALUE: 34
PIF_VALUE: 35
PIF_VALUE: 31
PIF_VALUE: 35
PIF_VALUE: 33
PIF_VALUE: 35
PIF_VALUE: 42
PIF_VALUE: 34
PIF_VALUE: 38
PIF_VALUE: 34
PIF_VALUE: 38
PIF_VALUE: 34
PIF_VALUE: 31

## 2022-07-17 NOTE — PROGRESS NOTES
[facility] Progress Note           Subjective/   80y.o. year old female who we are seeing in consultation for FARHEEN/CKD. HPI:  Resting on fentanyl and restraints  Status post tracheostomy and PEG tube on 7/15    Last 24-hour urine output of 725 mL, not all charted    ROS: No fever or chills. + edema.    Social: + visitor    Objective/   GEN:  Chronically ill, BP (!) 139/106   Pulse 90   Temp 99 °F (37.2 °C) (Bladder)   Resp 21   Ht 5' 6\" (1.676 m)   Wt 253 lb 8.5 oz (115 kg)   SpO2 93%   BMI 40.92 kg/m²    From 7/4  HEENT: non-icteric, NG tube trachea is  CV: S1, S2 without r/g; + LE edema  RESP: On vent, no wheezing   ABD: +bs, soft, nt, no hsm  SKIN: warm, no rashes    Data/  Recent Labs     07/15/22  0425 07/15/22  1322 07/16/22  0513 07/17/22  0442   WBC 10.0  --  11.8* 10.2   HGB 6.8* 7.7* 7.9* 8.0*   HCT 20.2* 23.8* 23.7* 24.6*   MCV 85.7  --  85.9 86.4     --  218 278       Recent Labs     07/15/22  0425 07/16/22  0513 07/17/22  0442   * 141 137   K 4.0 3.4* 3.6   CL 93* 96* 94*   CO2 30 26 27   GLUCOSE 110* 78 105*   PHOS 3.1 3.4 4.0   MG 1.80 1.70* 1.70*   BUN 41* 43* 45*   CREATININE 3.1* 3.1* 3.1*   LABGLOM 14* 14* 14*   GFRAA 17* 17* 17*       Component      Latest Ref Rng & Units 6/22/2022 6/22/2022           4:10 PM  4:10 PM   Color, UA      Straw/Yellow Yellow    Clarity, UA      Clear Clear    Glucose, UA      Negative mg/dL Negative    Bilirubin, Urine      Negative Negative    Ketones, Urine      Negative mg/dL TRACE (A)    Specific Gravity, UA      1.005 - 1.030 1.025    Blood, Urine      Negative Negative    pH, UA      5.0 - 8.0 5.5    Protein, UA      Negative mg/dL TRACE (A)    Urobilinogen, Urine      <2.0 E.U./dL 0.2    Nitrite, Urine      Negative Negative    Leukocyte Esterase, Urine      Negative Negative    Microscopic Examination       YES    Urine Type       NotGiven    Mucus, UA      None Seen /LPF Rare (A)    WBC, UA      0 - 5 /HPF

## 2022-07-17 NOTE — PROGRESS NOTES
Hospitalist Progress Note      PCP: Candy Selby MD    Date of Admission: 6/17/2022    Hospital Course:   Admitted for resp failure from CHF , pna, and unable to manage own secretions with advanced parkinson's, eventually intubated    Prolonged vent support - there were discussions about trach and PEG support, though able to extubate eventually. Patient was extubated on 6/27/2022. She was then placed on BiPAP. Continued on O2 daytime and BiPAP HS. Transferred out of ICU    Secretions remain a major problem. Resp culture (BAL) growing candida - started antifungal.     Repeat emesis 7/1 and spiking high fever and worsening O2 requirement - she was transferred back to ICU on 7/1 overnight.     Continued on BiPAP    7/7- S.p bronch with clearance of mucous plugs   S/p FEES by SLP team    Transferred out of ICU to PCU on 7/9    Developed worsening resp status and needed bipap and reintubation again 7/13  Underwent bronchoscopy x 2 more times for clearance of secretions    Sp trach and PEG on 7/15    7/17- Mr Darcie Iqbal seen sedated in bed, on vent support via trach  Remains on low dose fentanyl gtt  Arousable but drowsy     Fevers improving   BP remains improved         Medications:  Reviewed    Infusion Medications    sodium chloride      dexmedetomidine HCl in NaCl Stopped (07/15/22 1445)    fentaNYL 25 mcg/hr (07/17/22 0429)    norepinephrine Stopped (07/14/22 4372)    sodium chloride      sodium chloride 5 mL/hr at 07/10/22 0747     Scheduled Medications    medicated lip balm        sennosides-docusate sodium  1 tablet Oral BID    [Held by provider] furosemide  80 mg IntraVENous BID    [Held by provider] heparin (porcine)  5,000 Units SubCUTAneous 3 times per day    potassium bicarb-citric acid  20 mEq Oral Once    sodium bicarbonate  650 mg Oral Daily    acetylcysteine  600 mg Inhalation BID    ipratropium-albuterol  1 ampule Inhalation Q4H While awake    pantoprazole  40 mg IntraVENous BID chlorhexidine  15 mL Mouth/Throat BID    sodium chloride flush  10 mL IntraVENous 2 times per day    atorvastatin  40 mg Oral Nightly    carbidopa-levodopa  1 tablet Oral 4x Daily    [Held by provider] DULoxetine  60 mg Oral Daily    lamoTRIgine  50 mg Oral BID    montelukast  10 mg Oral Daily    propafenone  150 mg Oral 3 times per day     PRN Meds: sodium chloride, midazolam, fentanNYL, potassium chloride, magnesium sulfate, sodium phosphate IVPB **OR** sodium phosphate IVPB, morphine, carboxymethylcellulose PF, sodium chloride, sodium chloride flush, sodium chloride, promethazine **OR** ondansetron, acetaminophen **OR** acetaminophen, albuterol      Intake/Output Summary (Last 24 hours) at 7/17/2022 0732  Last data filed at 7/16/2022 2000  Gross per 24 hour   Intake 318.22 ml   Output 725 ml   Net -406.78 ml         Physical Exam Performed:    BP (!) 111/59   Pulse 90   Temp 99 °F (37.2 °C) (Bladder)   Resp 24   Ht 5' 6\" (1.676 m)   Wt 253 lb 8.5 oz (115 kg)   SpO2 95%   BMI 40.92 kg/m²     General: elderly female obese,  arousable and on vent support  Mucous Membranes:  Pink , anicteric  Left opaque cornea  Tracheostomy noted   Neck: No JVD, no carotid bruit, no thyromegaly  Chest: Diminished breath sounds with resolving left sided  rhonchi. No wheezes  Cardiovascular:  RRR S1S2 heard, no murmurs or gallops  Abdomen:  Soft, obese  undistended, non tender, no organomegaly, BS present  PEG noted   Extremities: Left UE picc noted  No edema or cyanosis.  Distal pulses well felt  Neurological : arousable, drowsy, moving ext         Labs:   Recent Labs     07/15/22  0425 07/15/22  1322 07/16/22  0513 07/17/22  0442   WBC 10.0  --  11.8* 10.2   HGB 6.8* 7.7* 7.9* 8.0*   HCT 20.2* 23.8* 23.7* 24.6*     --  218 278       Recent Labs     07/15/22  0425 07/16/22  0513 07/17/22  0442   * 141 137   K 4.0 3.4* 3.6   CL 93* 96* 94*   CO2 30 26 27   BUN 41* 43* 45*   CREATININE 3.1* 3.1* 3.1*   CALCIUM 9.0 9.4 9.6   PHOS 3.1 3.4 4.0       No results for input(s): AST, ALT, BILIDIR, BILITOT, ALKPHOS in the last 72 hours. No results for input(s): INR in the last 72 hours. No results for input(s): [place] in the last 72 hours. Urinalysis:      Lab Results   Component Value Date/Time    NITRU Negative 07/12/2022 01:33 PM    WBCUA 3-5 07/12/2022 01:33 PM    BACTERIA Rare 07/12/2022 01:33 PM    RBCUA 0-2 07/12/2022 01:33 PM    BLOODU TRACE-LYSED 07/12/2022 01:33 PM    SPECGRAV 1.010 07/12/2022 01:33 PM    GLUCOSEU Negative 07/12/2022 01:33 PM       Radiology:  XR CHEST PORTABLE   Preliminary Result   Stable examination with perihilar airspace opacities and bilateral left   greater right pleural effusions that may represent pneumonia or pulmonary   edema. Removal of ET tube and enteric tube with placement of tracheostomy tube. Otherwise stable exam.         XR CHEST PORTABLE   Final Result   No significant improvement. Moderate diffuse infiltrates and/or congestion   identified in the lungs. XR CHEST PORTABLE   Final Result   Bilateral pleuroparenchymal disease, increased on the right         XR CHEST PORTABLE   Final Result   Interval worsening with increased pneumonia/consolidation left lung and   mild-to-moderate congestion. XR CHEST PORTABLE   Final Result   Lines and tubes in satisfactory position with ill-defined opacifications in   the left lung base and small to moderate size residual left pleural effusion. XR CHEST PORTABLE   Final Result   1. Endotracheal tube terminates in appropriate position above the nirali. The enteric tube courses off the field of view in the upper abdomen. 2. Improved aeration of the left upper lung field with persistent   consolidation and/or effusion opacifying the left mid and lower lung fields. XR CHEST PORTABLE   Final Result   Persistent complete opacification of the left hemithorax.          XR CHEST PORTABLE   Final Result Interval complete opacification of the left hemithorax. CT CHEST WO CONTRAST   Final Result   1. Multifocal bronchopneumonia, with progression since 06/17/2022.   2. Small bilateral pleural effusions. 3. Narrowing of the AP diameter of the trachea. 4. Mild cardiomegaly. XR CHEST PORTABLE   Final Result   Airspace disease within the mid to lower lung zones is slightly increased as   compared to prior. Possible small left pleural effusion. CT ABDOMEN PELVIS WO CONTRAST Additional Contrast? None   Final Result   1. Nonspecific, nonobstructive gaseous bowel pattern. 2. Extensive diverticulosis of the left colon, lacking acute inflammatory   changes. 3. Bibasilar consolidations, atelectasis versus pneumonia. Pleural effusions   have resolved since 06/23/2022. XR ABDOMEN (KUB) (SINGLE AP VIEW)   Final Result   1. Gaseous distention of the stomach, otherwise, unremarkable bowel gas   pattern. XR CHEST PORTABLE   Final Result   Stable chest.  Persistent left lower lobe opacification with probable   effusion. XR CHEST PORTABLE   Final Result   Persistent left lower lobe opacification and effusion. Interval clearing of   perihilar edema and right basilar opacification and effusion. XR CHEST PORTABLE   Final Result   No significant interval change in bilateral airspace disease and small   effusions. Unchanged positioning of support devices. XR CHEST PORTABLE   Final Result   Increasing basilar opacities with probable small effusions. Unchanged positioning of support devices. XR CHEST PORTABLE   Final Result   1. No significant change. XR CHEST PORTABLE   Final Result   No significant interval change in basilar predominant airspace disease and   small left greater than right pleural effusions. Pulmonary vascular   congestion.          CT ABDOMEN PELVIS WO CONTRAST Additional Contrast? None   Final Result   Colonic diverticulosis without acute diverticulitis. Nonspecific rectal wall   thickening. Partially visualized pleural effusions with adjacent compressive   atelectasis. XR CHEST PORTABLE   Final Result   Again identified is bibasilar airspace disease with small pleural effusions   and mild increased right perihilar infiltrates. This may be related to   atelectasis or pneumonia. Cardiomegaly. XR CHEST PORTABLE   Final Result   Stable examination with bilateral small effusions and bibasilar atelectasis. Stable ET tube and enteric tube. XR CHEST PORTABLE   Final Result   No acute process. Bibasilar hypoaeration      Stable cardiomegaly         IR PICC WO SQ PORT/PUMP > 5 YEARS   Final Result      XR CHEST PORTABLE   Final Result   Supportive tubing projects in stable position. Partial regression of atelectasis in the right lung base. Persisting   consolidation or or atelectasis in the left lung base. XR CHEST PORTABLE   Final Result   1. Endotracheal tube terminates in appropriate position above the nirali. 2.  Enteric tube terminates in the body of the stomach. 3.  Basilar opacities again demonstrated, right greater than left. Interval   improved aeration of the left lung base. XR CHEST PORTABLE   Final Result   Increasing opacity in the right base, lobar atelectasis versus accumulating   right pleural effusion. Combination of both entities or underlying pneumonia   also possible. Persisting left basilar airspace disease and small pleural effusion. XR CHEST PORTABLE   Final Result   Hazy bilateral lobe airspace opacities and small left pleural effusion. This   could represent atelectasis or pneumonia in the appropriate clinical setting. CT CHEST WO CONTRAST   Final Result   1. Respiratory motion in the lower lungs. The right base has linear and   dependent atelectasis.   The left does have atelectatic changes but could also   have some patchy opacities from pneumonia or aspiration at the left base. 2.  There is no pleural effusion or pneumothorax. 3.  Narrowing of the AP diameter of the trachea with bulging of the   membranous portion due to the gas distended but nondilated esophagus. XR CHEST PORTABLE   Final Result   1. Bibasilar heterogeneous opacities may represent subsegmental atelectasis   and/or mild pulmonary edema. Underlying infection is difficult to exclude. Short-term follow-up PA and lateral chest radiographs may be helpful for   further evaluation. 2.  Cardiomegaly. Assessment/Plan:    Principal Problem:    Septicemia ([address]  Active Problems:    Aspiration pneumonia of left lower lobe (HCC)    Severe protein-calorie malnutrition (HCC)    Chronic anemia    Thrush    MRSA colonization    BETSY (obstructive sleep apnea)    Metabolic acidosis    Pulmonary congestion    Mucus plugging of bronchi    Atrial fibrillation, chronic (HCC)    Parkinson disease (HCC)    Acute respiratory failure with hypoxia (HCC)    Chronic kidney disease    Acute kidney injury superimposed on CKD ([address]  Resolved Problems:    Shortness of breath         Acute Hypox Resp Failure   Aspiration PNA   Treated with  IV Zosyn and vancomycin. Changed Zosyn to cefepime. DCd vancomycin 6/22. Finished 10 days of Vanco and cefepime  Intubated 6/18. Extubated on 6/27/2022.  -On BiPAP. Alternate BiPAP with Airvo. - SLP involved. - resp culture from BAL is growing candida - started diflucan on 7/1/2022   - spiking high fever 7/1 - back on Abx with Vanc and merrem IV.                  - poor physical condition limiting clearance of resp secretions- sp repeat bronch with clearing of mucous on 7/7 , 7/12, 7/13  . On inhaled Mucomyst   eventually needed tracheostomy and PEG placement on 7/15  ID following. Repeat cultures.    Hold off on antibiotics for now per ID  Can start weaning vent support and sedation     Left pleural effusion For bedside us today by pulm    Sepsis POA - due to above. Recurrent with aspiration  Now off antibiotics  Currently weaned off Levophed    Dysphagia. Abnormal CT  Cough with every meal intake per family report. Underlying Parkinson's. - continues to struggle with secretions. - CT abd pelvis 7/2 - actually fairly unremarkable. Melena  GI bleed  - drop in H/H   -GI consulted  -H&H held stable.  -Has since resumed eliquis  - IV PPI BID  Drop in H&H.  1 unit of red cells transfused 7/15      FARHEEN on CKD IIIB  -Likely secondary to ATN in the setting of pneumonia, hypotension and elevated vancomycin levels  -Had lasix gtt earlier this admission.  -Back on IV Lasix now, creatinine   Monitor BMP  Hold Lasix  Creatinine worsening. Nephrology consulted  IV Lasix with IV albumin per nephro(held currently by critical care)     Parkinson's disease   - on Sinemet.       Afib Paroxysmal   -Rythmol   -Eliquis held - can resume     Hx of HTN - nifedipine discontinued with hypotension issues  Resume other meds as needed         DVT Prophylaxis: heparin sc - can resume eliquis  Diet: Diet NPO  ADULT TUBE FEEDING; PEG; Renal Formula; Continuous; 15; No; 30; Q 3 hours  Code Status: Full Code      Dio Deal MD 7/17/2022 7:32 AM

## 2022-07-17 NOTE — PROGRESS NOTES
07/16/22 2326   NICU Vent Information   Vent Type 980   Vent Mode AC/VC   Vt (Set, mL) 355 mL   Vt Exhaled 353 mL   Resp Rate (Set) 24 bmp   Rate Measured 24 br/min   Minute Volume 8.4 Liters   Peak Flow 60 L/min   Pressure Support 0 cmH20   FiO2  50 %   Peak Inspiratory Pressure 34 cmH2O   I:E Ratio 1:2.90   Sensitivity 3   PEEP/CPAP (cmH2O) 8   Mean Airway Pressure 15 cmH20   Cough/Sputum   Sputum How Obtained Tracheal   Cough Productive   Sputum Amount Small   Sputum Color Creamy   Tenacity Thick   Breath Sounds   Right Upper Lobe Diminished   Right Middle Lobe Diminished   Right Lower Lobe Diminished   Left Upper Lobe Diminished   Left Lower Lobe Diminished   Additional Respiratory Assessments   Heart Rate 89   Resp 24   SpO2 95 %   Position Semi-Mccollum's   Humidification Source Heated wire   Humidification Temp 35.7   Circuit Condensation Drained   Vent Alarm Settings   High Pressure  45 cmH2O   Low Minute Volume Alarm 4 L/min   Low Exhaled Vt  210 mL   RR High 40 br/min   Apnea (Secs) 20 secs   Ambu Bag With Mask At Bedside Yes   Backup Trachs Available (Size) 6.0;8.0   Patient Observation   Observations han Gill dct

## 2022-07-17 NOTE — PROGRESS NOTES
PRN reasons based on the score. If a patient is on this medication at home then do not decrease Frequency below that used at home. 0-3 - enter or revise RT bronchodilator order(s) to equivalent RT Bronchodilator order with Frequency of every 4 hours PRN for wheezing or increased work of breathing using Per Protocol order mode. 4-6 - enter or revise RT Bronchodilator order(s) to two equivalent RT bronchodilator orders with one order with BID Frequency and one order with Frequency of every 4 hours PRN wheezing or increased work of breathing using Per Protocol order mode. 7-10 - enter or revise RT Bronchodilator order(s) to two equivalent RT bronchodilator orders with one order with TID Frequency and one order with Frequency of every 4 hours PRN wheezing or increased work of breathing using Per Protocol order mode. 11-13 - enter or revise RT Bronchodilator order(s) to one equivalent RT bronchodilator order with QID Frequency and an Albuterol order with Frequency of every 4 hours PRN wheezing or increased work of breathing using Per Protocol order mode. Greater than 13 - enter or revise RT Bronchodilator order(s) to one equivalent RT bronchodilator order with every 4 hours Frequency and an Albuterol order with Frequency of every 2 hours PRN wheezing or increased work of breathing using Per Protocol order mode.        Electronically signed by Sharmaine Burnett RCP on 7/17/2022 at 8:31 AM

## 2022-07-17 NOTE — PROGRESS NOTES
07/17/22 0259   NICU Vent Information   Vent Type 980   Vent Mode AC/VC   Vt (Set, mL) 355 mL   Vt Exhaled 346 mL   Resp Rate (Set) 24 bmp   Rate Measured 24 br/min   Minute Volume 8.33 Liters   Peak Flow 60 L/min   Pressure Support 0 cmH20   FiO2  50 %   Peak Inspiratory Pressure 34 cmH2O   I:E Ratio 1:2.90   Sensitivity 3   PEEP/CPAP (cmH2O) 8   Mean Airway Pressure 15 cmH20   Cough/Sputum   Sputum How Obtained Tracheal   Cough Productive   Sputum Amount Small   Sputum Color Creamy   Tenacity Thick   Breath Sounds   Right Upper Lobe Diminished   Right Middle Lobe Diminished   Right Lower Lobe Diminished   Left Upper Lobe Diminished   Left Lower Lobe Diminished   Additional Respiratory Assessments   Heart Rate 90   Resp 24   SpO2 94 %   Position Semi-Mccollum's   Humidification Source Heated wire   Humidification Temp 37   Circuit Condensation Drained   Vent Alarm Settings   High Pressure  45 cmH2O   Low Minute Volume Alarm 4 L/min   Low Exhaled Vt  210 mL   RR High 40 br/min   Apnea (Secs) 20 secs   Ambu Bag With Mask At Bedside Yes   Backup Trachs Available (Size) 6.0;8.0   Patient Observation   Observations han Gill dct

## 2022-07-17 NOTE — PROGRESS NOTES
Patient awake alert, nods appropriately to questions, VSS,  at bedside, assessment complete, awaiting care rounds.

## 2022-07-17 NOTE — PROGRESS NOTES
Q4H While awake    pantoprazole  40 mg IntraVENous BID    chlorhexidine  15 mL Mouth/Throat BID    sodium chloride flush  10 mL IntraVENous 2 times per day    atorvastatin  40 mg Oral Nightly    carbidopa-levodopa  1 tablet Oral 4x Daily    [Held by provider] DULoxetine  60 mg Oral Daily    lamoTRIgine  50 mg Oral BID    montelukast  10 mg Oral Daily    propafenone  150 mg Oral 3 times per day       Data:  CBC:   Recent Labs     07/15/22  0425 07/15/22  1322 07/16/22  0513 07/17/22  0442   WBC 10.0  --  11.8* 10.2   HGB 6.8* 7.7* 7.9* 8.0*   HCT 20.2* 23.8* 23.7* 24.6*   MCV 85.7  --  85.9 86.4     --  218 278       BMP:   Recent Labs     07/15/22  0425 07/16/22  0513 07/17/22  0442   * 141 137   K 4.0 3.4* 3.6   CL 93* 96* 94*   CO2 30 26 27   PHOS 3.1 3.4 4.0   BUN 41* 43* 45*   CREATININE 3.1* 3.1* 3.1*       LIVER PROFILE:   No results for input(s): AST, ALT, LIPASE, BILIDIR, BILITOT, ALKPHOS in the last 72 hours. Invalid input(s): AMYLASE,  ALB    Microbiology:  6/17/2022 SARS-CoV-2 and influenza are negative  6/17/2022 MRSA DNA screen positive  6/17/2022 blood NG  6/17/2022 sputum NRF  6/18/2022 tracheal aspirate NRF  6/19/2022 BAL Candida  6/19/2022 respiratory viral panel negative  7/1/22 Blood cx NGTD  7/7/2022 bronchial washings and BAL Candida krusei      Imaging:  CT chest 7/3/22  images reviewed by me and showed:   1. Multifocal bronchopneumonia, with progression since 06/17/2022.   2. Small bilateral pleural effusions  3. Narrowing of the AP diameter of the trachea  4.  Mild cardiomegaly    ASSESSMENT:  Acute hypoxemic respiratory failure, initially on vent, extubated but then returned to ICU and required BiPAP  Aspiration pneumonia - recurrent with septic shock mucous plugging of bronchi on bronchoscopy 7/7/2022  Tracheobronchomalacia on bronchoscopy 7/7/2022 -  perhaps limiting airway clearance  BETSY on BiPAP 22/15 with 1 L bleed in   Parkinson's disease  Atrial fibrillation  Acute on chronic kidney failure, baseline creatinine 1.6  Acute on chronic anemia, s/p 2 U PRBC 6/23/2022  H/O esophagitis  Pleural effusions are small bilaterally  Bloody sputum    PLAN:    For trach today? peg tube   On fentanyl 25 will wean to as needed  Dropped her Hb and had one RBC still holding elquis as she may need thoracentesis   Given 1 RBC  Nephrology following need lasix   To decide dabout anticoagulation when hemoglobin more stable and also if we are going to do thoracentesis,hold heparin as she drop 6.8,may getr GI to clear for anticoagulation;  Stool ocult blood + in Junr  S/p bronch ,large mucus plug was removed   Mucomyst 3 ML 20% inhalation BID  Metaneb  NT suctioning as needed   All abx managed by ID ,stopped   Fluconazole stopped ,grow candida   Watch Hb ,on tube feeding and she is at goal   Defer abx to ID      discussed with  in detail        Care reviewed with nursing staff, medical and surgical specialty care, primary care and the patient's family as available. Chart review/lab review/X-ray viewing/documentation and had long Conversation with patient/family re: prognosis, care options and any end of life issues:      Critical care time spent reviewing labs/films, examining patient, collaborating with other physicians more than 35  Minutes  excluding procedures . Zachary Briggs M.D.   7/17/2022  9:11 AM

## 2022-07-18 PROBLEM — E44.1 MILD PROTEIN-CALORIE MALNUTRITION (HCC): Status: ACTIVE | Noted: 2022-07-18

## 2022-07-18 LAB
ANION GAP SERPL CALCULATED.3IONS-SCNC: 14 MMOL/L (ref 3–16)
BASOPHILS ABSOLUTE: 0.2 K/UL (ref 0–0.2)
BASOPHILS RELATIVE PERCENT: 2 %
BUN BLDV-MCNC: 47 MG/DL (ref 7–20)
CALCIUM SERPL-MCNC: 9.7 MG/DL (ref 8.3–10.6)
CHLORIDE BLD-SCNC: 95 MMOL/L (ref 99–110)
CO2: 29 MMOL/L (ref 21–32)
CREAT SERPL-MCNC: 3 MG/DL (ref 0.6–1.2)
EOSINOPHILS ABSOLUTE: 1.4 K/UL (ref 0–0.6)
EOSINOPHILS RELATIVE PERCENT: 14 %
FUNGUS (MYCOLOGY) CULTURE: ABNORMAL
FUNGUS STAIN: ABNORMAL
GFR AFRICAN AMERICAN: 18
GFR NON-AFRICAN AMERICAN: 15
GLUCOSE BLD-MCNC: 105 MG/DL (ref 70–99)
GLUCOSE BLD-MCNC: 106 MG/DL (ref 70–99)
GLUCOSE BLD-MCNC: 110 MG/DL (ref 70–99)
GLUCOSE BLD-MCNC: 94 MG/DL (ref 70–99)
GLUCOSE BLD-MCNC: 99 MG/DL (ref 70–99)
HCT VFR BLD CALC: 23.8 % (ref 36–48)
HEMOGLOBIN: 7.8 G/DL (ref 12–16)
HYPOCHROMIA: ABNORMAL
LYMPHOCYTES ABSOLUTE: 2.1 K/UL (ref 1–5.1)
LYMPHOCYTES RELATIVE PERCENT: 20 %
MAGNESIUM: 1.7 MG/DL (ref 1.8–2.4)
MCH RBC QN AUTO: 28.2 PG (ref 26–34)
MCHC RBC AUTO-ENTMCNC: 32.8 G/DL (ref 31–36)
MCV RBC AUTO: 86.1 FL (ref 80–100)
MONOCYTES ABSOLUTE: 0.6 K/UL (ref 0–1.3)
MONOCYTES RELATIVE PERCENT: 6 %
NEUTROPHILS ABSOLUTE: 6 K/UL (ref 1.7–7.7)
NEUTROPHILS RELATIVE PERCENT: 58 %
ORGANISM: ABNORMAL
PDW BLD-RTO: 13.9 % (ref 12.4–15.4)
PERFORMED ON: ABNORMAL
PERFORMED ON: NORMAL
PHOSPHORUS: 4 MG/DL (ref 2.5–4.9)
PLATELET # BLD: 291 K/UL (ref 135–450)
PLATELET SLIDE REVIEW: ADEQUATE
PMV BLD AUTO: 9.1 FL (ref 5–10.5)
POTASSIUM SERPL-SCNC: 3.5 MMOL/L (ref 3.5–5.1)
POTASSIUM SERPL-SCNC: 3.9 MMOL/L (ref 3.5–5.1)
RBC # BLD: 2.77 M/UL (ref 4–5.2)
SCHISTOCYTES: ABNORMAL
SLIDE REVIEW: ABNORMAL
SODIUM BLD-SCNC: 138 MMOL/L (ref 136–145)
WBC # BLD: 10.3 K/UL (ref 4–11)

## 2022-07-18 PROCEDURE — 94640 AIRWAY INHALATION TREATMENT: CPT

## 2022-07-18 PROCEDURE — 2700000000 HC OXYGEN THERAPY PER DAY

## 2022-07-18 PROCEDURE — 83735 ASSAY OF MAGNESIUM: CPT

## 2022-07-18 PROCEDURE — 6370000000 HC RX 637 (ALT 250 FOR IP): Performed by: SURGERY

## 2022-07-18 PROCEDURE — 85025 COMPLETE CBC W/AUTO DIFF WBC: CPT

## 2022-07-18 PROCEDURE — 2000000000 HC ICU R&B

## 2022-07-18 PROCEDURE — 6360000002 HC RX W HCPCS: Performed by: INTERNAL MEDICINE

## 2022-07-18 PROCEDURE — 94003 VENT MGMT INPAT SUBQ DAY: CPT

## 2022-07-18 PROCEDURE — 2580000003 HC RX 258: Performed by: SURGERY

## 2022-07-18 PROCEDURE — 80048 BASIC METABOLIC PNL TOTAL CA: CPT

## 2022-07-18 PROCEDURE — 94669 MECHANICAL CHEST WALL OSCILL: CPT

## 2022-07-18 PROCEDURE — C9113 INJ PANTOPRAZOLE SODIUM, VIA: HCPCS | Performed by: SURGERY

## 2022-07-18 PROCEDURE — 99291 CRITICAL CARE FIRST HOUR: CPT | Performed by: INTERNAL MEDICINE

## 2022-07-18 PROCEDURE — 84100 ASSAY OF PHOSPHORUS: CPT

## 2022-07-18 PROCEDURE — 99232 SBSQ HOSP IP/OBS MODERATE 35: CPT | Performed by: INTERNAL MEDICINE

## 2022-07-18 PROCEDURE — 94761 N-INVAS EAR/PLS OXIMETRY MLT: CPT

## 2022-07-18 PROCEDURE — 6360000002 HC RX W HCPCS: Performed by: SURGERY

## 2022-07-18 PROCEDURE — 84132 ASSAY OF SERUM POTASSIUM: CPT

## 2022-07-18 PROCEDURE — 6370000000 HC RX 637 (ALT 250 FOR IP): Performed by: INTERNAL MEDICINE

## 2022-07-18 RX ORDER — OXYCODONE HYDROCHLORIDE 5 MG/1
5 TABLET ORAL EVERY 4 HOURS PRN
Status: DISCONTINUED | OUTPATIENT
Start: 2022-07-18 | End: 2022-07-19 | Stop reason: HOSPADM

## 2022-07-18 RX ORDER — LORAZEPAM 1 MG/1
1 TABLET ORAL
Status: DISCONTINUED | OUTPATIENT
Start: 2022-07-18 | End: 2022-07-19 | Stop reason: HOSPADM

## 2022-07-18 RX ADMIN — IPRATROPIUM BROMIDE AND ALBUTEROL SULFATE 1 AMPULE: 2.5; .5 SOLUTION RESPIRATORY (INHALATION) at 15:42

## 2022-07-18 RX ADMIN — PROPAFENONE HYDROCHLORIDE 150 MG: 150 TABLET, FILM COATED ORAL at 22:30

## 2022-07-18 RX ADMIN — LAMOTRIGINE 50 MG: 25 TABLET ORAL at 08:49

## 2022-07-18 RX ADMIN — LORAZEPAM 1 MG: 1 TABLET ORAL at 20:12

## 2022-07-18 RX ADMIN — ACETYLCYSTEINE 600 MG: 200 INHALANT RESPIRATORY (INHALATION) at 07:48

## 2022-07-18 RX ADMIN — LORAZEPAM 1 MG: 1 TABLET ORAL at 22:30

## 2022-07-18 RX ADMIN — Medication 15 ML: at 08:48

## 2022-07-18 RX ADMIN — LORAZEPAM 1 MG: 1 TABLET ORAL at 15:32

## 2022-07-18 RX ADMIN — PROPAFENONE HYDROCHLORIDE 150 MG: 150 TABLET, FILM COATED ORAL at 15:32

## 2022-07-18 RX ADMIN — OXYCODONE 5 MG: 5 TABLET ORAL at 15:32

## 2022-07-18 RX ADMIN — MONTELUKAST SODIUM 10 MG: 10 TABLET, COATED ORAL at 08:49

## 2022-07-18 RX ADMIN — SODIUM CHLORIDE, PRESERVATIVE FREE 10 ML: 5 INJECTION INTRAVENOUS at 08:49

## 2022-07-18 RX ADMIN — CARBIDOPA AND LEVODOPA 1 TABLET: 25; 100 TABLET ORAL at 08:49

## 2022-07-18 RX ADMIN — SODIUM CHLORIDE, PRESERVATIVE FREE 10 ML: 5 INJECTION INTRAVENOUS at 20:12

## 2022-07-18 RX ADMIN — SENNOSIDES AND DOCUSATE SODIUM 1 TABLET: 50; 8.6 TABLET ORAL at 20:12

## 2022-07-18 RX ADMIN — IPRATROPIUM BROMIDE AND ALBUTEROL SULFATE 1 AMPULE: 2.5; .5 SOLUTION RESPIRATORY (INHALATION) at 19:31

## 2022-07-18 RX ADMIN — CARBIDOPA AND LEVODOPA 1 TABLET: 25; 100 TABLET ORAL at 20:11

## 2022-07-18 RX ADMIN — IPRATROPIUM BROMIDE AND ALBUTEROL SULFATE 1 AMPULE: 2.5; .5 SOLUTION RESPIRATORY (INHALATION) at 11:53

## 2022-07-18 RX ADMIN — OXYCODONE 5 MG: 5 TABLET ORAL at 20:12

## 2022-07-18 RX ADMIN — CARBIDOPA AND LEVODOPA 1 TABLET: 25; 100 TABLET ORAL at 16:53

## 2022-07-18 RX ADMIN — PANTOPRAZOLE SODIUM 40 MG: 40 INJECTION, POWDER, LYOPHILIZED, FOR SOLUTION INTRAVENOUS at 20:12

## 2022-07-18 RX ADMIN — SODIUM BICARBONATE 650 MG: 650 TABLET ORAL at 08:49

## 2022-07-18 RX ADMIN — ATORVASTATIN CALCIUM 40 MG: 40 TABLET, FILM COATED ORAL at 20:11

## 2022-07-18 RX ADMIN — PANTOPRAZOLE SODIUM 40 MG: 40 INJECTION, POWDER, LYOPHILIZED, FOR SOLUTION INTRAVENOUS at 08:49

## 2022-07-18 RX ADMIN — OXYCODONE 5 MG: 5 TABLET ORAL at 10:34

## 2022-07-18 RX ADMIN — SENNOSIDES AND DOCUSATE SODIUM 1 TABLET: 50; 8.6 TABLET ORAL at 08:49

## 2022-07-18 RX ADMIN — CARBIDOPA AND LEVODOPA 1 TABLET: 25; 100 TABLET ORAL at 13:45

## 2022-07-18 RX ADMIN — Medication 15 ML: at 20:11

## 2022-07-18 RX ADMIN — ACETYLCYSTEINE 600 MG: 200 INHALANT RESPIRATORY (INHALATION) at 19:37

## 2022-07-18 RX ADMIN — IPRATROPIUM BROMIDE AND ALBUTEROL SULFATE 1 AMPULE: 2.5; .5 SOLUTION RESPIRATORY (INHALATION) at 23:06

## 2022-07-18 RX ADMIN — PROPAFENONE HYDROCHLORIDE 150 MG: 150 TABLET, FILM COATED ORAL at 06:49

## 2022-07-18 RX ADMIN — POTASSIUM CHLORIDE 20 MEQ: 29.8 INJECTION INTRAVENOUS at 10:39

## 2022-07-18 RX ADMIN — IPRATROPIUM BROMIDE AND ALBUTEROL SULFATE 1 AMPULE: 2.5; .5 SOLUTION RESPIRATORY (INHALATION) at 07:48

## 2022-07-18 RX ADMIN — POTASSIUM CHLORIDE 20 MEQ: 29.8 INJECTION INTRAVENOUS at 08:48

## 2022-07-18 RX ADMIN — FUROSEMIDE 40 MG: 10 INJECTION, SOLUTION INTRAMUSCULAR; INTRAVENOUS at 08:49

## 2022-07-18 RX ADMIN — LORAZEPAM 1 MG: 1 TABLET ORAL at 10:34

## 2022-07-18 RX ADMIN — LAMOTRIGINE 50 MG: 25 TABLET ORAL at 20:12

## 2022-07-18 ASSESSMENT — PULMONARY FUNCTION TESTS
PIF_VALUE: 27
PIF_VALUE: 30
PIF_VALUE: 27
PIF_VALUE: 30
PIF_VALUE: 28
PIF_VALUE: 33
PIF_VALUE: 31
PIF_VALUE: 29
PIF_VALUE: 29
PIF_VALUE: 27
PIF_VALUE: 28
PIF_VALUE: 30
PIF_VALUE: 35
PIF_VALUE: 28
PIF_VALUE: 29
PIF_VALUE: 29
PIF_VALUE: 31
PIF_VALUE: 28
PIF_VALUE: 31
PIF_VALUE: 31
PIF_VALUE: 27
PIF_VALUE: 34
PIF_VALUE: 37

## 2022-07-18 ASSESSMENT — PAIN DESCRIPTION - DESCRIPTORS: DESCRIPTORS: DISCOMFORT

## 2022-07-18 ASSESSMENT — PAIN SCALES - WONG BAKER
WONGBAKER_NUMERICALRESPONSE: 6
WONGBAKER_NUMERICALRESPONSE: 6
WONGBAKER_NUMERICALRESPONSE: 0

## 2022-07-18 ASSESSMENT — PAIN SCALES - GENERAL
PAINLEVEL_OUTOF10: 2
PAINLEVEL_OUTOF10: 2
PAINLEVEL_OUTOF10: 0

## 2022-07-18 NOTE — PROGRESS NOTES
Pt continues with vent/trach. Vent settings are 24 / 355 / 45% / 5. Breath sounds are rhonchorous throughout. Heart rate is SR on the bedside monitor with rates in the 90s. Peripheral edema noted, but appears to have subsided some. Scheduled meds given per orders. 0 residual noted to PEG tube. TF continues at goal rate. No c/o nausea. LUE PICC infusing without issue, but no blood return noted. Will talk to MD regarding cathflo for line. Fentanyl infusing. RASS 0  CPOT 0    Zimmerman patent and draining clear yellow urine.

## 2022-07-18 NOTE — PROGRESS NOTES
Pulmonary & Critical Care Medicine ICU Progress Note    CC: shortness of breath     Events of Last 24 hours:   Ongoing mechanical ventilation     Vascular lines: IV: PICC 6/19/2022       Intake/Output Summary (Last 24 hours) at 7/18/2022 0732  Last data filed at 7/18/2022 0600  Gross per 24 hour   Intake 659.83 ml   Output 950 ml   Net -290.17 ml         MV: 6/18/2022 -6/27/22  Vent Mode: AC/VC Resp Rate (Set): 24 bmp/Vt (Set, mL): 355 mL/ /FiO2 : 45 %  No results for input(s): PHART, LXW1TGD, PO2ART in the last 72 hours. IV:   sodium chloride      dexmedetomidine HCl in NaCl Stopped (07/15/22 1445)    fentaNYL 25 mcg/hr (07/17/22 0429)    norepinephrine Stopped (07/14/22 2144)    sodium chloride      sodium chloride 5 mL/hr at 07/10/22 0747       Vitals:  Blood pressure (!) 122/46, pulse 91, temperature 99.3 °F (37.4 °C), temperature source Bladder, resp. rate 24, height 5' 6\" (1.676 m), weight 253 lb 8.5 oz (115 kg), SpO2 95 %, not currently breastfeeding. On Vent  General:  ill appearing    ENT: Pharynx with ETT. Resp: No crackles. No wheezing. CV: S1, S2. +edema  GI: NT, ND, +BS  Skin: Warm and dry. Neuro: PERRL. Awake, regards.  Patellar reflexes are symmetric        Scheduled Meds:   furosemide  40 mg IntraVENous Daily    sennosides-docusate sodium  1 tablet Oral BID    [Held by provider] heparin (porcine)  5,000 Units SubCUTAneous 3 times per day    potassium bicarb-citric acid  20 mEq Oral Once    sodium bicarbonate  650 mg Oral Daily    acetylcysteine  600 mg Inhalation BID    ipratropium-albuterol  1 ampule Inhalation Q4H While awake    pantoprazole  40 mg IntraVENous BID    chlorhexidine  15 mL Mouth/Throat BID    sodium chloride flush  10 mL IntraVENous 2 times per day    atorvastatin  40 mg Oral Nightly    carbidopa-levodopa  1 tablet Oral 4x Daily    [Held by provider] DULoxetine  60 mg Oral Daily    lamoTRIgine  50 mg Oral BID    montelukast  10 mg Oral Daily    propafenone  150 mg Oral 3 times per day       Data:  CBC:   Recent Labs     07/16/22  0513 07/17/22  0442 07/18/22  0500   WBC 11.8* 10.2 10.3   HGB 7.9* 8.0* 7.8*   HCT 23.7* 24.6* 23.8*   MCV 85.9 86.4 86.1    278 291     BMP:   Recent Labs     07/16/22  0513 07/17/22  0442 07/18/22  0500    137 138   K 3.4* 3.6 3.5   CL 96* 94* 95*   CO2 26 27 29   PHOS 3.4 4.0 4.0   BUN 43* 45* 47*   CREATININE 3.1* 3.1* 3.0*     LIVER PROFILE:   No results for input(s): AST, ALT, LIPASE, BILIDIR, BILITOT, ALKPHOS in the last 72 hours. Invalid input(s): AMYLASE,  ALB    Microbiology:  6/17/2022 SARS-CoV-2 and influenza are negative  6/17/2022 MRSA DNA screen positive  6/17/2022 blood NG  6/17/2022 sputum NRF  6/18/2022 tracheal aspirate NRF  6/19/2022 BAL Candida  6/19/2022 respiratory viral panel negative  7/1/22 Blood cx NGTD  7/7/2022 bronchial washings and BAL Candida krusei  7/12/2022 bronchial washings no growth      Imaging:  CT chest 7/3/22  images reviewed by me and showed:   1. Multifocal bronchopneumonia, with progression since 06/17/2022.   2. Small bilateral pleural effusions  3. Narrowing of the AP diameter of the trachea  4. Mild cardiomegaly    CXR 7/16/2022 left greater than right pleural effusion, tracheostomy tube in place    ASSESSMENT:  Acute on chronic hypoxemic respiratory failure   S/P trach and PEG on 7/15/22  Aspiration pneumonia - recurrent  Mucous plugging of bronchi  Tracheobronchomalacia on bronchoscopy 7/7/2022 -  perhaps limiting airway clearance  BETSY previously on BiPAP 22/15 with 1 L bleed in   Parkinson's disease  Atrial fibrillation  Acute on chronic kidney failure, baseline creatinine 1.6  Acute on chronic anemia, s/p 2 U PRBC 6/23/2022, 1 U PRBC on 7/15/22    PLAN:  Mechanical ventilation as per my orders. The ventilator was adjusted by me at the bedside for unstable, life threatening respiratory failure.   IV sedation with IV fentanyl; change to PRN sedation today   Levophed is off   L>R pleural effusion - too small to tap on bedside ultrasound   Lasix daily   Off antbiotics, infectious disease is following   Mucomyst, Metanebs, tracheal suctioning   Prophylaxis: on BID protonix, SCD  I d/w her spouse at the bedside today     Total critical care time caring for this patient with life threatening, unstable organ failure, including direct patient contact, management of life support systems, review of data including imaging and labs, discussions with other team members and physicians is 35 minutes so far today, excluding procedures.

## 2022-07-18 NOTE — PROGRESS NOTES
Rounds completed with Dr. Elza Allen at this time. New orders to try cathflow in [facility] PICC since no blood return, change sedation to PRN per peg tube, and to place SCDs.       High risk vesicant drug infusing:  __________    Multiple incompatible medications infusing:  _________    CVP Monitoring:  _________    Extremely difficult IV access challenge:  ____x____    Continued need for central line access:  ____x______    Addressed with physician:  ____x____    RIGHT PATIENT, RIGHT TIME, RIGHT LINE

## 2022-07-18 NOTE — PROGRESS NOTES
[facility] Progress Note           Subjective/   80y.o. year old female who we are seeing in consultation for FARHEEN/CKD. HPI:  Renal function poor though stable, urine output of 950 ml over last 24 hours. Remains on ventilator. ROS:   Intake reduced, +weak.     Objective/   GEN:  Chronically ill, BP (!) 142/93   Pulse 95   Temp 99.1 °F (37.3 °C) (Bladder)   Resp 24   Ht 5' 6\" (1.676 m)   Wt 253 lb 8.5 oz (115 kg)   SpO2 97%   BMI 40.92 kg/m²    From 7/4  HEENT: non-icteric, +trach  CV: S1, S2 without r/g; + LE edema  RESP: On vent, no wheezing   ABD: +bs, soft, nt, no hsm, +PEG  SKIN: warm, no rashes    Data/  Recent Labs     07/16/22  0513 07/17/22  0442 07/18/22  0500   WBC 11.8* 10.2 10.3   HGB 7.9* 8.0* 7.8*   HCT 23.7* 24.6* 23.8*   MCV 85.9 86.4 86.1    278 291       Recent Labs     07/16/22  0513 07/17/22  0442 07/18/22  0500 07/18/22  1257    137 138  --    K 3.4* 3.6 3.5 3.9   CL 96* 94* 95*  --    CO2 26 27 29  --    GLUCOSE 78 105* 99  --    PHOS 3.4 4.0 4.0  --    MG 1.70* 1.70* 1.70*  --    BUN 43* 45* 47*  --    CREATININE 3.1* 3.1* 3.0*  --    LABGLOM 14* 14* 15*  --    GFRAA 17* 17* 18*  --        Component      Latest Ref Rng & Units 6/22/2022 6/22/2022           4:10 PM  4:10 PM   Color, UA      Straw/Yellow Yellow    Clarity, UA      Clear Clear    Glucose, UA      Negative mg/dL Negative    Bilirubin, Urine      Negative Negative    Ketones, Urine      Negative mg/dL TRACE (A)    Specific Gravity, UA      1.005 - 1.030 1.025    Blood, Urine      Negative Negative    pH, UA      5.0 - 8.0 5.5    Protein, UA      Negative mg/dL TRACE (A)    Urobilinogen, Urine      <2.0 E.U./dL 0.2    Nitrite, Urine      Negative Negative    Leukocyte Esterase, Urine      Negative Negative    Microscopic Examination       YES    Urine Type       NotGiven    Mucus, UA      None Seen /LPF Rare (A)    WBC, UA      0 - 5 /HPF 10-20 (A)    RBC, UA      0 - 4 /HPF 3-4 Epithelial Cells, UA      0 - 5 /HPF 0-1    Bacteria, UA      None Seen /HPF 2+ (A)    Sodium, Ur      Not Established mmol/L  <20   Potassium, Ur      Not Established mmol/L  26.3   Chloride      Not Established mmol/L  <20       Assessment/     - Acute kidney injury - multi-factorial ATN injury in setting of aspiration PNA, hypotension   Volume overloaded    Non-oliguric and responding to IV lasix, BP stable at this point    Creatinine stabilized ~3 range    - Chronic kidney disease stage 3b - background DM/HTN/CHF              SCr 1.6              Follows with [facility]     - Acute hypoxemic respiratory failure/Aspiration PNA/Pulmonary edema    On vent, improved FiO2 requirements over the last 24 hours   S/p bronch    Planning for trach      - Diastolic CHF     - Atrial fibrillation    - Anemia - prn prbc's     - Hypertension   Range is now low but stable    - Hypokalemia, improved. - Metabolic acidosis, resolved    - Dysphagia, patient has been started on some puréed food    Plan/     -Lasix 40 mg IV daily from 7/18  -Trend labs, bp's, & urine output    ____________________________________  Ansley Wood MD  [facility]  [email or web address]  Office: [phone]

## 2022-07-18 NOTE — PROGRESS NOTES
07/17/22 1950   [facility] Vent Information   Vent Type 980   Vent Mode AC/VC   Vt (Set, mL) 355 mL   Vt Exhaled 336 mL   Resp Rate (Set) 24 bmp   Rate Measured 24 br/min   Minute Volume 8.31 Liters   Peak Flow 60 L/min   Pressure Support 0 cmH20   FiO2  45 %   Peak Inspiratory Pressure 34 cmH2O   I:E Ratio 1:2.90   Sensitivity 3   PEEP/CPAP (cmH2O) 8   Mean Airway Pressure 15 cmH20   Plateau Pressure 26 [id number]   Static Compliance 19 mL/cmH2O   Dynamic Compliance 15 mL/cmH2O   Cough/Sputum   Sputum How Obtained Tracheal   Cough Productive   Sputum Amount Small   Sputum Color Creamy; Yellow   Tenacity Thick   Breath Sounds   Right Upper Lobe Diminished   Right Middle Lobe Diminished   Right Lower Lobe Diminished   Left Upper Lobe Diminished   Left Lower Lobe Diminished   Additional Respiratory Assessments   Heart Rate 92   Resp 24   SpO2 95 %   Position Semi-Mccollum's   Humidification Source Heated wire   Humidification Temp 37   Circuit Condensation Drained   Vent Alarm Settings   High Pressure  50 cmH2O   Low Minute Volume Alarm 4 L/min   Low Exhaled Vt  210 mL   RR High 40 br/min   Apnea (Secs) 20 secs   Ambu Bag With Mask At Bedside Yes   Backup Trachs Available (Size) 6.0;8.0   Patient Observation   Observations han 8 dct

## 2022-07-18 NOTE — CARE COORDINATION
INTERDISCIPLINARY PLAN OF CARE CONFERENCE    Date/Time: 7/18/2022 11:12 AM  Completed by: JENNIFER Nava  Case Management      Patient Name:  Fredy Esparza  YOB: 1940  Admitting Diagnosis: Shortness of breath [R06.02]  Septicemia ([facility] [address] [A41.9]  Chronic anemia [D64.9]  Acute respiratory failure with hypoxia ([facility] [address] [J96.01]  Aspiration pneumonia of left lower lobe, unspecified aspiration pneumonia type ([facility] [address] [J69.0]  Chronic kidney disease, unspecified CKD stage [N18.9]     Admit Date/Time:  6/17/2022 12:50 AM    Chart reviewed. Interdisciplinary team contacted or reviewed plan related to patient progress and discharge plans. Disciplines included Case Management, Nursing, and Dietitian. Current Status:ongoing   PT/OT recommendation for discharge plan of care: n/a    Expected D/C Disposition:  [facility]  Confirmed plan with patient and/or family Yes confirmed with: (name) pt and pt's  Dian Holding at bedside    Discharge Plan Comments: Chart review completed. Completed Interdisciplinary rounds with ICU staff. Dr. Zoe Dixon stated pt may be able to discharge tomorrow to [facility]. Met with pt and pt's  at bedside. Pt did not participate in conversation (trach/vent). Pt's  Dian Holding aware of the above and remains in agreement with [facility] as the first location. He stated first opening on transportation. He denied needs for CM. Spoke with Wei Anguiano at [employer] ([phone]) who states okay to schedule transport for pt to come tomorrow to [facility] pending MD order as she is aware MD states pt not ready today. Transport scheduled via [employer] at 1:00pm 7/19/2022; Wei Anguiano aware via phone call and stated report number is [phone]. Will cancel and notify Wei Anguiano if not able to discharge tomorrow. RN aware. CM will continue to follow and assist. Please notify CM if needs or concerns arise.     Home O2 in place on admit: Yes

## 2022-07-18 NOTE — PROGRESS NOTES
55 mls of fentanyl gtt wasted with witness, Gamaliel Chapa RN.  Electronically signed by Cheal Townsend RN on 7/18/2022 at 3:50 PM    Electronically signed by Chaparro Steward RN on 7/18/2022 at 4:09 PM

## 2022-07-18 NOTE — PROGRESS NOTES
4 Eyes Skin Assessment     The patient is being assess for   Daily skin check     I agree that 2 RN's have performed a thorough Head to Toe Skin Assessment on the patient. ALL assessment sites listed below have been assessed. Areas assessed for pressure by both nurses:   [x]   Head, Face, and Ears   [x]   Shoulders, Back, and Chest, Abdomen  [x]   Arms, Elbows, and Hands   [x]   Coccyx, Sacrum, and Ischium  [x]   Legs, Feet, and Heels      Scattered bruising. Pink and blanchable coccyx. Peeling skin to buttocks. Skin Assessed Under all Medical Devices by both nurses:  archuleta, SCD's, and trach collar. All Mepilex Borders were peeled back and area peeked at by both nurses:  Yes  Please list where Mepilex Borders are located:  sacrum              **SHARE this note so that the co-signing nurse is able to place an eSignature**    Co-signer eSignature: Electronically signed by Arnulfo Bradley RN on 7/18/22 at 4:09 PM EDT    Does the Patient have Skin Breakdown related to pressure?   No          Vineet Prevention initiated:  Yes   Wound Care Orders initiated:  NA      [facility] nurse consulted for Pressure Injury (Stage 3,4, Unstageable, DTI, NWPT, Complex wounds)and New or Established Ostomies:  NA      Primary Nurse eSignature: Electronically signed by Denita Zaidi RN on 7/18/22 at 4:06 PM EDT

## 2022-07-18 NOTE — PROGRESS NOTES
07/18/22 0252   NICU Vent Information   Vent Type 980   Vent Mode AC/VC   Vt (Set, mL) 355 mL   Vt Exhaled 336 mL   Resp Rate (Set) 24 bmp   Rate Measured 24 br/min   Minute Volume 8.36 Liters   Peak Flow 60 L/min   Pressure Support 0 cmH20   FiO2  45 %   Peak Inspiratory Pressure 31 cmH2O   I:E Ratio 1:2.90   Sensitivity 3   PEEP/CPAP (cmH2O) 8   Mean Airway Pressure 14 cmH20   Cough/Sputum   Sputum How Obtained Suctioned;Tracheal   Sputum Amount None   Breath Sounds   Right Upper Lobe Diminished   Right Middle Lobe Diminished   Right Lower Lobe Diminished   Left Upper Lobe Diminished   Left Lower Lobe Diminished   Additional Respiratory Assessments   Heart Rate 84   Resp 24   SpO2 92 %   Position Semi-Mccollum's   Vent Alarm Settings   High Pressure  50 cmH2O   Low Minute Volume Alarm 4 L/min   RR High 40 br/min   Surgical Airway (Trach) 07/15/22 [place] Cuffed   Placement Date/Time: 07/15/22 1139   Present on Admission/Arrival: No  Placed By: In surgery  Placement Verified By: Esophageal detection device;Direct visualization; Auscultation  Surgical Airway Type: Tracheostomy  Brand: Laurita  Style: Cuffed  Size . ..    Status Secured   Site Assessment Dry

## 2022-07-18 NOTE — PROGRESS NOTES
07/17/22 2326   NICU Vent Information   Vent Type 980   Vent Mode AC/VC   Vt (Set, mL) 355 mL   Vt Exhaled 206 mL   Resp Rate (Set) 24 bmp   Rate Measured 59 br/min   Minute Volume 9.72 Liters   Peak Flow 60 L/min   Pressure Support 0 cmH20   FiO2  45 %   Peak Inspiratory Pressure 39 cmH2O   I:E Ratio 1.50:1   Sensitivity 3   PEEP/CPAP (cmH2O) 8   Mean Airway Pressure 21 cmH20   Cough/Sputum   Sputum How Obtained Suctioned;Tracheal   Sputum Amount Small   Sputum Color Creamy   Tenacity Thick   Breath Sounds   Right Upper Lobe Diminished   Right Middle Lobe Diminished   Right Lower Lobe Diminished   Left Upper Lobe Diminished   Left Lower Lobe Diminished   Additional Respiratory Assessments   Heart Rate 94   Resp 14   SpO2 95 %   Position Semi-Mccollum's   Vent Alarm Settings   High Pressure  50 cmH2O   Low Minute Volume Alarm 4 L/min   RR High 40 br/min

## 2022-07-18 NOTE — PROGRESS NOTES
07/17/22 2000   RT Protocol   History Pulmonary Disease 2   Respiratory pattern 2   Breath sounds 2   Cough 3   Indications for Bronchodilator Therapy Mucolytic ordered   Bronchodilator Assessment Score 9   RT Inhaler-Nebulizer Bronchodilator Protocol Note    There is a bronchodilator order in the chart from a provider indicating to follow the RT Bronchodilator Protocol and there is an Initiate RT Inhaler-Nebulizer Bronchodilator Protocol order as well (see protocol at bottom of note). CXR Findings:  XR CHEST PORTABLE    Result Date: 7/16/2022  Stable examination with perihilar airspace opacities and bilateral left greater right pleural effusions that may represent pneumonia or pulmonary edema. Removal of ET tube and enteric tube with placement of tracheostomy tube. Otherwise stable exam.       The findings from the last RT Protocol Assessment were as follows:   History Pulmonary Disease: Chronic pulmonary disease  Respiratory Pattern: Dyspnea on exertion or RR 21-25 bpm  Breath Sounds: Slightly diminished and/or crackles  Cough: Weak, non-productive  Indication for Bronchodilator Therapy: Mucolytic ordered  Bronchodilator Assessment Score: 9    Aerosolized bronchodilator medication orders have been revised according to the RT Inhaler-Nebulizer Bronchodilator Protocol below. Respiratory Therapist to perform RT Therapy Protocol Assessment initially then follow the protocol. Repeat RT Therapy Protocol Assessment PRN for score 0-3 or on second treatment, BID, and PRN for scores above 3. No Indications - adjust the frequency to every 6 hours PRN wheezing or bronchospasm, if no treatments needed after 48 hours then discontinue using Per Protocol order mode. If indication present, adjust the RT bronchodilator orders based on the Bronchodilator Assessment Score as indicated below.   Use Inhaler orders unless patient has one or more of the following: on home nebulizer, not able to hold breath for 10 seconds, is not alert and oriented, cannot activate and use MDI correctly, or respiratory rate 25 breaths per minute or more, then use the equivalent nebulizer order(s) with same Frequency and PRN reasons based on the score. If a patient is on this medication at home then do not decrease Frequency below that used at home. 0-3 - enter or revise RT bronchodilator order(s) to equivalent RT Bronchodilator order with Frequency of every 4 hours PRN for wheezing or increased work of breathing using Per Protocol order mode. 4-6 - enter or revise RT Bronchodilator order(s) to two equivalent RT bronchodilator orders with one order with BID Frequency and one order with Frequency of every 4 hours PRN wheezing or increased work of breathing using Per Protocol order mode. 7-10 - enter or revise RT Bronchodilator order(s) to two equivalent RT bronchodilator orders with one order with TID Frequency and one order with Frequency of every 4 hours PRN wheezing or increased work of breathing using Per Protocol order mode. 11-13 - enter or revise RT Bronchodilator order(s) to one equivalent RT bronchodilator order with QID Frequency and an Albuterol order with Frequency of every 4 hours PRN wheezing or increased work of breathing using Per Protocol order mode. Greater than 13 - enter or revise RT Bronchodilator order(s) to one equivalent RT bronchodilator order with every 4 hours Frequency and an Albuterol order with Frequency of every 2 hours PRN wheezing or increased work of breathing using Per Protocol order mode.      Electronically signed by Anupam Parish RCP on 7/17/2022 at 8:05 PM

## 2022-07-18 NOTE — PROGRESS NOTES
Hospitalist Progress Note      PCP: Nohemi Sebastian MD    Date of Admission: 6/17/2022    Hospital Course:   Admitted for resp failure from CHF , pna, and unable to manage own secretions with advanced parkinson's, eventually intubated    Prolonged vent support - there were discussions about trach and PEG support, though able to extubate eventually. Patient was extubated on 6/27/2022. She was then placed on BiPAP. Continued on O2 daytime and BiPAP HS. Transferred out of ICU    Secretions remain a major problem. Resp culture (BAL) growing candida - started antifungal.     Repeat emesis 7/1 and spiking high fever and worsening O2 requirement - she was transferred back to ICU on 7/1 overnight. Continued on BiPAP    7/7- S.p bronch with clearance of mucous plugs   S/p FEES by SLP team    Transferred out of ICU to PCU on 7/9    Developed worsening resp status and needed bipap and reintubation again 7/13  Underwent bronchoscopy x 2 more times for clearance of secretions    Sp trach and PEG on 7/15    7/17- Mr Malik Lieu seen sedated in bed, on vent support via trach  Remains on low dose fentanyl gtt  Arousable but drowsy     Fevers improving   BP remains improved     7/18- patient has a tracheostomy and PEG tube. Plans for thoracentesis today. Follows simple commands.        Medications:  Reviewed    Infusion Medications    sodium chloride      sodium chloride      sodium chloride 5 mL/hr at 07/10/22 0747     Scheduled Medications    alteplase  1 mg IntraCATHeter Once    furosemide  40 mg IntraVENous Daily    sennosides-docusate sodium  1 tablet Oral BID    [Held by provider] heparin (porcine)  5,000 Units SubCUTAneous 3 times per day    potassium bicarb-citric acid  20 mEq Oral Once    sodium bicarbonate  650 mg Oral Daily    acetylcysteine  600 mg Inhalation BID    ipratropium-albuterol  1 ampule Inhalation Q4H While awake    pantoprazole  40 mg IntraVENous BID    chlorhexidine  15 mL Mouth/Throat BID sodium chloride flush  10 mL IntraVENous 2 times per day    atorvastatin  40 mg Oral Nightly    carbidopa-levodopa  1 tablet Oral 4x Daily    [Held by provider] DULoxetine  60 mg Oral Daily    lamoTRIgine  50 mg Oral BID    montelukast  10 mg Oral Daily    propafenone  150 mg Oral 3 times per day     PRN Meds: LORazepam, oxyCODONE, sodium chloride, potassium chloride, magnesium sulfate, sodium phosphate IVPB **OR** sodium phosphate IVPB, carboxymethylcellulose PF, sodium chloride, sodium chloride flush, sodium chloride, promethazine **OR** ondansetron, acetaminophen **OR** acetaminophen, albuterol      Intake/Output Summary (Last 24 hours) at 7/18/2022 1410  Last data filed at 7/18/2022 1039  Gross per 24 hour   Intake 1050.83 ml   Output 835 ml   Net 215.83 ml         Physical Exam Performed:    BP (!) 138/52   Pulse 91   Temp 99.1 °F (37.3 °C) (Bladder)   Resp 24   Ht 5' 6\" (1.676 m)   Wt 253 lb 8.5 oz (115 kg)   SpO2 94%   BMI 40.92 kg/m²     General: elderly female obese,  arousable and on vent support  Mucous Membranes:  Pink , anicteric  Left opaque cornea  Tracheostomy noted   Neck: No JVD, no carotid bruit, no thyromegaly  Chest: Diminished breath sounds with resolving left sided  rhonchi. No wheezes  Cardiovascular:  RRR S1S2 heard, no murmurs or gallops  Abdomen:  Soft, obese  undistended, non tender, no organomegaly, BS present  PEG noted   Extremities: Left UE picc noted  No edema or cyanosis.  Distal pulses well felt  Neurological : arousable, drowsy, moving ext         Labs:   Recent Labs     07/16/22  0513 07/17/22  0442 07/18/22  0500   WBC 11.8* 10.2 10.3   HGB 7.9* 8.0* 7.8*   HCT 23.7* 24.6* 23.8*    278 291       Recent Labs     07/16/22  0513 07/17/22  0442 07/18/22  0500 07/18/22  1257    137 138  --    K 3.4* 3.6 3.5 3.9   CL 96* 94* 95*  --    CO2 26 27 29  --    BUN 43* 45* 47*  --    CREATININE 3.1* 3.1* 3.0*  --    CALCIUM 9.4 9.6 9.7  --    PHOS 3.4 4.0 4.0  -- opacification of the left hemithorax. CT CHEST WO CONTRAST   Final Result   1. Multifocal bronchopneumonia, with progression since 06/17/2022.   2. Small bilateral pleural effusions. 3. Narrowing of the AP diameter of the trachea. 4. Mild cardiomegaly. XR CHEST PORTABLE   Final Result   Airspace disease within the mid to lower lung zones is slightly increased as   compared to prior. Possible small left pleural effusion. CT ABDOMEN PELVIS WO CONTRAST Additional Contrast? None   Final Result   1. Nonspecific, nonobstructive gaseous bowel pattern. 2. Extensive diverticulosis of the left colon, lacking acute inflammatory   changes. 3. Bibasilar consolidations, atelectasis versus pneumonia. Pleural effusions   have resolved since 06/23/2022. XR ABDOMEN (KUB) (SINGLE AP VIEW)   Final Result   1. Gaseous distention of the stomach, otherwise, unremarkable bowel gas   pattern. XR CHEST PORTABLE   Final Result   Stable chest.  Persistent left lower lobe opacification with probable   effusion. XR CHEST PORTABLE   Final Result   Persistent left lower lobe opacification and effusion. Interval clearing of   perihilar edema and right basilar opacification and effusion. XR CHEST PORTABLE   Final Result   No significant interval change in bilateral airspace disease and small   effusions. Unchanged positioning of support devices. XR CHEST PORTABLE   Final Result   Increasing basilar opacities with probable small effusions. Unchanged positioning of support devices. XR CHEST PORTABLE   Final Result   1. No significant change. XR CHEST PORTABLE   Final Result   No significant interval change in basilar predominant airspace disease and   small left greater than right pleural effusions. Pulmonary vascular   congestion.          CT ABDOMEN PELVIS WO CONTRAST Additional Contrast? None   Final Result   Colonic diverticulosis without acute diverticulitis. Nonspecific rectal wall   thickening. Partially visualized pleural effusions with adjacent compressive   atelectasis. XR CHEST PORTABLE   Final Result   Again identified is bibasilar airspace disease with small pleural effusions   and mild increased right perihilar infiltrates. This may be related to   atelectasis or pneumonia. Cardiomegaly. XR CHEST PORTABLE   Final Result   Stable examination with bilateral small effusions and bibasilar atelectasis. Stable ET tube and enteric tube. XR CHEST PORTABLE   Final Result   No acute process. Bibasilar hypoaeration      Stable cardiomegaly         IR PICC WO SQ PORT/PUMP > 5 YEARS   Final Result      XR CHEST PORTABLE   Final Result   Supportive tubing projects in stable position. Partial regression of atelectasis in the right lung base. Persisting   consolidation or or atelectasis in the left lung base. XR CHEST PORTABLE   Final Result   1. Endotracheal tube terminates in appropriate position above the nirali. 2.  Enteric tube terminates in the body of the stomach. 3.  Basilar opacities again demonstrated, right greater than left. Interval   improved aeration of the left lung base. XR CHEST PORTABLE   Final Result   Increasing opacity in the right base, lobar atelectasis versus accumulating   right pleural effusion. Combination of both entities or underlying pneumonia   also possible. Persisting left basilar airspace disease and small pleural effusion. XR CHEST PORTABLE   Final Result   Hazy bilateral lobe airspace opacities and small left pleural effusion. This   could represent atelectasis or pneumonia in the appropriate clinical setting. CT CHEST WO CONTRAST   Final Result   1. Respiratory motion in the lower lungs. The right base has linear and   dependent atelectasis.   The left does have atelectatic changes but could also   have some patchy opacities from pneumonia or aspiration at the left base. 2.  There is no pleural effusion or pneumothorax. 3.  Narrowing of the AP diameter of the trachea with bulging of the   membranous portion due to the gas distended but nondilated esophagus. XR CHEST PORTABLE   Final Result   1. Bibasilar heterogeneous opacities may represent subsegmental atelectasis   and/or mild pulmonary edema. Underlying infection is difficult to exclude. Short-term follow-up PA and lateral chest radiographs may be helpful for   further evaluation. 2.  Cardiomegaly. Assessment/Plan:    Principal Problem:    Septicemia ([address]  Active Problems:    Aspiration pneumonia of left lower lobe (HCC)    Severe protein-calorie malnutrition (HCC)    Chronic anemia    Thrush    MRSA colonization    BETSY (obstructive sleep apnea)    Metabolic acidosis    Pulmonary congestion    Mucus plugging of bronchi    Atrial fibrillation, chronic (HCC)    Parkinson disease (HCC)    Acute respiratory failure with hypoxia (HCC)    Chronic kidney disease    Acute kidney injury superimposed on CKD ([address]  Resolved Problems:    Shortness of breath         Acute Hypox Resp Failure   Aspiration PNA   Treated with  IV Zosyn and vancomycin. Changed Zosyn to cefepime. DCd vancomycin 6/22. Finished 10 days of Vanco and cefepime  Intubated 6/18. Extubated on 6/27/2022.  -On BiPAP. Alternate BiPAP with Airvo. - SLP involved. - resp culture from BAL is growing candida - started diflucan on 7/1/2022   - spiking high fever 7/1 - back on Abx with Vanc and merrem IV.                  - poor physical condition limiting clearance of resp secretions- sp repeat bronch with clearing of mucous on 7/7 , 7/12, 7/13  . On inhaled Mucomyst   eventually needed tracheostomy and PEG placement on 7/15  ID following. Repeat cultures.    Hold off on antibiotics for now per ID  Can start weaning vent support and sedation     Left pleural effusion   For bedside us today by pulm    Sepsis POA - due to above. Recurrent with aspiration  Now off antibiotics  Currently weaned off Levophed    Dysphagia. Abnormal CT  Cough with every meal intake per family report. Underlying Parkinson's. - continues to struggle with secretions. - CT abd pelvis 7/2 - actually fairly unremarkable. Melena  GI bleed  - drop in H/H   -GI consulted  -H&H held stable.  -Has since resumed eliquis  - IV PPI BID  Drop in H&H.  1 unit of red cells transfused 7/15      FARHEEN on CKD IIIB  -Likely secondary to ATN in the setting of pneumonia, hypotension and elevated vancomycin levels  -Had lasix gtt earlier this admission.  -Back on IV Lasix now, creatinine   Monitor BMP  Hold Lasix  Creatinine stable. Nephrology consulted  IV Lasix with IV albumin per nephro(held currently by critical care)     Parkinson's disease   - on Sinemet. Afib Paroxysmal   -Rythmol   -Eliquis held - for possible procedure.      Hx of HTN - nifedipine discontinued with hypotension issues  Resume other meds as needed         DVT Prophylaxis: heparin sc - can resume eliquis  Diet: Diet NPO  ADULT TUBE FEEDING; PEG; Renal Formula; Continuous; 15; No; 30; Q 3 hours  Code Status: Full Code      Jacquelin Cooks, MD 7/18/2022 2:10 PM

## 2022-07-19 VITALS
OXYGEN SATURATION: 94 % | HEIGHT: 66 IN | HEART RATE: 104 BPM | BODY MASS INDEX: 40.75 KG/M2 | DIASTOLIC BLOOD PRESSURE: 37 MMHG | TEMPERATURE: 99 F | RESPIRATION RATE: 17 BRPM | SYSTOLIC BLOOD PRESSURE: 104 MMHG | WEIGHT: 253.53 LBS

## 2022-07-19 LAB
ANION GAP SERPL CALCULATED.3IONS-SCNC: 12 MMOL/L (ref 3–16)
BASE EXCESS VENOUS: 3.3 MMOL/L (ref -3–3)
BASE EXCESS VENOUS: 4.7 MMOL/L (ref -3–3)
BASOPHILS ABSOLUTE: 0 K/UL (ref 0–0.2)
BASOPHILS RELATIVE PERCENT: 0.4 %
BUN BLDV-MCNC: 47 MG/DL (ref 7–20)
CALCIUM SERPL-MCNC: 9.9 MG/DL (ref 8.3–10.6)
CARBOXYHEMOGLOBIN: 1.9 % (ref 0–1.5)
CARBOXYHEMOGLOBIN: 2 % (ref 0–1.5)
CHLORIDE BLD-SCNC: 99 MMOL/L (ref 99–110)
CO2: 31 MMOL/L (ref 21–32)
CREAT SERPL-MCNC: 3.1 MG/DL (ref 0.6–1.2)
EOSINOPHILS ABSOLUTE: 0.8 K/UL (ref 0–0.6)
EOSINOPHILS RELATIVE PERCENT: 11.2 %
GFR AFRICAN AMERICAN: 17
GFR NON-AFRICAN AMERICAN: 14
GLUCOSE BLD-MCNC: 100 MG/DL (ref 70–99)
GLUCOSE BLD-MCNC: 108 MG/DL (ref 70–99)
GLUCOSE BLD-MCNC: 86 MG/DL (ref 70–99)
GLUCOSE BLD-MCNC: 95 MG/DL (ref 70–99)
GLUCOSE BLD-MCNC: 96 MG/DL (ref 70–99)
HCO3 VENOUS: 26.8 MMOL/L (ref 23–29)
HCO3 VENOUS: 27.4 MMOL/L (ref 23–29)
HCT VFR BLD CALC: 23.9 % (ref 36–48)
HEMOGLOBIN: 7.9 G/DL (ref 12–16)
LYMPHOCYTES ABSOLUTE: 1.1 K/UL (ref 1–5.1)
LYMPHOCYTES RELATIVE PERCENT: 15.7 %
MAGNESIUM: 2.1 MG/DL (ref 1.8–2.4)
MCH RBC QN AUTO: 28.4 PG (ref 26–34)
MCHC RBC AUTO-ENTMCNC: 33.1 G/DL (ref 31–36)
MCV RBC AUTO: 85.8 FL (ref 80–100)
METHEMOGLOBIN VENOUS: 0.3 %
METHEMOGLOBIN VENOUS: 0.3 %
MONOCYTES ABSOLUTE: 1 K/UL (ref 0–1.3)
MONOCYTES RELATIVE PERCENT: 14.3 %
NEUTROPHILS ABSOLUTE: 4.2 K/UL (ref 1.7–7.7)
NEUTROPHILS RELATIVE PERCENT: 58.4 %
O2 CONTENT, VEN: 12 VOL %
O2 CONTENT, VEN: 12 VOL %
O2 SAT, VEN: 99 %
O2 SAT, VEN: 99 %
O2 THERAPY: ABNORMAL
O2 THERAPY: ABNORMAL
PCO2, VEN: 32.9 MMHG (ref 40–50)
PCO2, VEN: 36.4 MMHG (ref 40–50)
PDW BLD-RTO: 13.7 % (ref 12.4–15.4)
PERFORMED ON: ABNORMAL
PERFORMED ON: ABNORMAL
PERFORMED ON: NORMAL
PERFORMED ON: NORMAL
PH VENOUS: 7.49 (ref 7.35–7.45)
PH VENOUS: 7.54 (ref 7.35–7.45)
PHOSPHORUS: 4.4 MG/DL (ref 2.5–4.9)
PLATELET # BLD: 332 K/UL (ref 135–450)
PMV BLD AUTO: 8.9 FL (ref 5–10.5)
PO2, VEN: 185.8 MMHG (ref 25–40)
PO2, VEN: 196.9 MMHG (ref 25–40)
POTASSIUM SERPL-SCNC: 4.2 MMOL/L (ref 3.5–5.1)
RBC # BLD: 2.79 M/UL (ref 4–5.2)
SODIUM BLD-SCNC: 142 MMOL/L (ref 136–145)
TCO2 CALC VENOUS: 28 MMOL/L
TCO2 CALC VENOUS: 28 MMOL/L
WBC # BLD: 7.1 K/UL (ref 4–11)

## 2022-07-19 PROCEDURE — 83735 ASSAY OF MAGNESIUM: CPT

## 2022-07-19 PROCEDURE — 6370000000 HC RX 637 (ALT 250 FOR IP): Performed by: INTERNAL MEDICINE

## 2022-07-19 PROCEDURE — 99233 SBSQ HOSP IP/OBS HIGH 50: CPT | Performed by: INTERNAL MEDICINE

## 2022-07-19 PROCEDURE — 80048 BASIC METABOLIC PNL TOTAL CA: CPT

## 2022-07-19 PROCEDURE — 6370000000 HC RX 637 (ALT 250 FOR IP): Performed by: SURGERY

## 2022-07-19 PROCEDURE — 99239 HOSP IP/OBS DSCHRG MGMT >30: CPT | Performed by: INTERNAL MEDICINE

## 2022-07-19 PROCEDURE — 2580000003 HC RX 258: Performed by: SURGERY

## 2022-07-19 PROCEDURE — 2700000000 HC OXYGEN THERAPY PER DAY

## 2022-07-19 PROCEDURE — C9113 INJ PANTOPRAZOLE SODIUM, VIA: HCPCS | Performed by: SURGERY

## 2022-07-19 PROCEDURE — 94669 MECHANICAL CHEST WALL OSCILL: CPT

## 2022-07-19 PROCEDURE — 94761 N-INVAS EAR/PLS OXIMETRY MLT: CPT

## 2022-07-19 PROCEDURE — 6360000002 HC RX W HCPCS: Performed by: INTERNAL MEDICINE

## 2022-07-19 PROCEDURE — 85025 COMPLETE CBC W/AUTO DIFF WBC: CPT

## 2022-07-19 PROCEDURE — 94640 AIRWAY INHALATION TREATMENT: CPT

## 2022-07-19 PROCEDURE — 6360000002 HC RX W HCPCS: Performed by: SURGERY

## 2022-07-19 PROCEDURE — 94003 VENT MGMT INPAT SUBQ DAY: CPT

## 2022-07-19 PROCEDURE — 84100 ASSAY OF PHOSPHORUS: CPT

## 2022-07-19 PROCEDURE — 36415 COLL VENOUS BLD VENIPUNCTURE: CPT

## 2022-07-19 PROCEDURE — 82803 BLOOD GASES ANY COMBINATION: CPT

## 2022-07-19 RX ORDER — FUROSEMIDE 40 MG/1
40 TABLET ORAL DAILY
Qty: 45 TABLET | Refills: 1
Start: 2022-07-19

## 2022-07-19 RX ORDER — OXYCODONE HYDROCHLORIDE 5 MG/1
5 TABLET ORAL EVERY 4 HOURS PRN
Qty: 6 TABLET | Refills: 0
Start: 2022-07-19 | End: 2022-07-19 | Stop reason: SDUPTHER

## 2022-07-19 RX ORDER — PANTOPRAZOLE SODIUM 40 MG/1
40 TABLET, DELAYED RELEASE ORAL
Qty: 60 TABLET | Refills: 0
Start: 2022-07-19

## 2022-07-19 RX ORDER — OXYCODONE HYDROCHLORIDE 5 MG/1
5 TABLET ORAL EVERY 4 HOURS PRN
Qty: 6 TABLET | Refills: 0 | Status: SHIPPED | OUTPATIENT
Start: 2022-07-19 | End: 2022-07-22

## 2022-07-19 RX ORDER — ACETYLCYSTEINE 200 MG/ML
600 SOLUTION ORAL; RESPIRATORY (INHALATION) 2 TIMES DAILY
Qty: 10 ML | Refills: 0
Start: 2022-07-19

## 2022-07-19 RX ORDER — CHLORHEXIDINE GLUCONATE 0.12 MG/ML
15 RINSE ORAL 2 TIMES DAILY
Qty: 420 ML | Refills: 0
Start: 2022-07-19 | End: 2022-08-02

## 2022-07-19 RX ORDER — METOPROLOL TARTRATE 50 MG/1
25 TABLET, FILM COATED ORAL 2 TIMES DAILY
Qty: 180 TABLET | Refills: 1
Start: 2022-07-19

## 2022-07-19 RX ORDER — FUROSEMIDE 10 MG/ML
40 INJECTION INTRAMUSCULAR; INTRAVENOUS DAILY
Status: DISCONTINUED | OUTPATIENT
Start: 2022-07-20 | End: 2022-07-19 | Stop reason: HOSPADM

## 2022-07-19 RX ADMIN — LORAZEPAM 1 MG: 1 TABLET ORAL at 12:19

## 2022-07-19 RX ADMIN — PANTOPRAZOLE SODIUM 40 MG: 40 INJECTION, POWDER, LYOPHILIZED, FOR SOLUTION INTRAVENOUS at 08:20

## 2022-07-19 RX ADMIN — SENNOSIDES AND DOCUSATE SODIUM 1 TABLET: 50; 8.6 TABLET ORAL at 08:21

## 2022-07-19 RX ADMIN — CARBIDOPA AND LEVODOPA 1 TABLET: 25; 100 TABLET ORAL at 08:21

## 2022-07-19 RX ADMIN — LAMOTRIGINE 50 MG: 25 TABLET ORAL at 08:24

## 2022-07-19 RX ADMIN — SODIUM BICARBONATE 650 MG: 650 TABLET ORAL at 08:20

## 2022-07-19 RX ADMIN — CARBIDOPA AND LEVODOPA 1 TABLET: 25; 100 TABLET ORAL at 12:19

## 2022-07-19 RX ADMIN — OXYCODONE 5 MG: 5 TABLET ORAL at 03:59

## 2022-07-19 RX ADMIN — LORAZEPAM 1 MG: 1 TABLET ORAL at 04:00

## 2022-07-19 RX ADMIN — SODIUM CHLORIDE, PRESERVATIVE FREE 10 ML: 5 INJECTION INTRAVENOUS at 08:21

## 2022-07-19 RX ADMIN — IPRATROPIUM BROMIDE AND ALBUTEROL SULFATE 1 AMPULE: 2.5; .5 SOLUTION RESPIRATORY (INHALATION) at 08:41

## 2022-07-19 RX ADMIN — FUROSEMIDE 40 MG: 10 INJECTION, SOLUTION INTRAMUSCULAR; INTRAVENOUS at 08:21

## 2022-07-19 RX ADMIN — ACETYLCYSTEINE 600 MG: 200 INHALANT RESPIRATORY (INHALATION) at 08:42

## 2022-07-19 RX ADMIN — IPRATROPIUM BROMIDE AND ALBUTEROL SULFATE 1 AMPULE: 2.5; .5 SOLUTION RESPIRATORY (INHALATION) at 11:30

## 2022-07-19 RX ADMIN — MONTELUKAST SODIUM 10 MG: 10 TABLET, COATED ORAL at 08:20

## 2022-07-19 RX ADMIN — Medication 15 ML: at 08:21

## 2022-07-19 RX ADMIN — PROPAFENONE HYDROCHLORIDE 150 MG: 150 TABLET, FILM COATED ORAL at 05:40

## 2022-07-19 ASSESSMENT — PULMONARY FUNCTION TESTS
PIF_VALUE: 28
PIF_VALUE: 29
PIF_VALUE: 27
PIF_VALUE: 29
PIF_VALUE: 27
PIF_VALUE: 30
PIF_VALUE: 26
PIF_VALUE: 30
PIF_VALUE: 29
PIF_VALUE: 30
PIF_VALUE: 27
PIF_VALUE: 26
PIF_VALUE: 28
PIF_VALUE: 27

## 2022-07-19 ASSESSMENT — PAIN DESCRIPTION - LOCATION: LOCATION: GENERALIZED

## 2022-07-19 ASSESSMENT — PAIN SCALES - WONG BAKER
WONGBAKER_NUMERICALRESPONSE: 6

## 2022-07-19 ASSESSMENT — PAIN DESCRIPTION - DESCRIPTORS: DESCRIPTORS: DISCOMFORT

## 2022-07-19 NOTE — PROGRESS NOTES
Dr. Shruti Locke made aware of VBG results. Orders received to decrease RR to 16. No further orders. PICC line removed with out issue. Awaiting transport at this time. Will continue to monitor.

## 2022-07-19 NOTE — PROGRESS NOTES
RT Inhaler-Nebulizer Bronchodilator Protocol Note    There is a bronchodilator order in the chart from a provider indicating to follow the RT Bronchodilator Protocol and there is an Initiate RT Inhaler-Nebulizer Bronchodilator Protocol order as well (see protocol at bottom of note). CXR Findings:  No results found. The findings from the last RT Protocol Assessment were as follows:   History Pulmonary Disease: Chronic pulmonary disease  Respiratory Pattern: Dyspnea on exertion or RR 21-25 bpm  Breath Sounds: (P) Inspiratory and expiratory or bilateral wheezing and/or rhonchi  Cough: (P) Weak, productive  Indication for Bronchodilator Therapy: (P) Mucolytic ordered  Bronchodilator Assessment Score: (P) 12    Aerosolized bronchodilator medication orders have been revised according to the RT Inhaler-Nebulizer Bronchodilator Protocol below. Respiratory Therapist to perform RT Therapy Protocol Assessment initially then follow the protocol. Repeat RT Therapy Protocol Assessment PRN for score 0-3 or on second treatment, BID, and PRN for scores above 3. No Indications - adjust the frequency to every 6 hours PRN wheezing or bronchospasm, if no treatments needed after 48 hours then discontinue using Per Protocol order mode. If indication present, adjust the RT bronchodilator orders based on the Bronchodilator Assessment Score as indicated below. Use Inhaler orders unless patient has one or more of the following: on home nebulizer, not able to hold breath for 10 seconds, is not alert and oriented, cannot activate and use MDI correctly, or respiratory rate 25 breaths per minute or more, then use the equivalent nebulizer order(s) with same Frequency and PRN reasons based on the score. If a patient is on this medication at home then do not decrease Frequency below that used at home.     0-3 - enter or revise RT bronchodilator order(s) to equivalent RT Bronchodilator order with Frequency of every 4 hours PRN for wheezing or increased work of breathing using Per Protocol order mode. 4-6 - enter or revise RT Bronchodilator order(s) to two equivalent RT bronchodilator orders with one order with BID Frequency and one order with Frequency of every 4 hours PRN wheezing or increased work of breathing using Per Protocol order mode. 7-10 - enter or revise RT Bronchodilator order(s) to two equivalent RT bronchodilator orders with one order with TID Frequency and one order with Frequency of every 4 hours PRN wheezing or increased work of breathing using Per Protocol order mode. 11-13 - enter or revise RT Bronchodilator order(s) to one equivalent RT bronchodilator order with QID Frequency and an Albuterol order with Frequency of every 4 hours PRN wheezing or increased work of breathing using Per Protocol order mode. Greater than 13 - enter or revise RT Bronchodilator order(s) to one equivalent RT bronchodilator order with every 4 hours Frequency and an Albuterol order with Frequency of every 2 hours PRN wheezing or increased work of breathing using Per Protocol order mode.        Electronically signed by Irving Barrera RCP on 7/18/2022 at 8:22 PM

## 2022-07-19 NOTE — CARE COORDINATION
DISCHARGE ORDER  Date/Time 2022 8:34 AM  Completed by: JENNIFER Handy  Case Management    Patient Name: Roxanne Padilla    : 1940      Admit order Date and Status: 2022 Inpatient   Noted discharge order. (verify MD's last order for status of admission/Traditional Medicare 3 [place] Inpatient qualifying stay required for SNF)    Confirmed discharge plan with:              Patient:  Yes (trach/vent)              When pt confirms DC plan does any support person need to be contacted by CM Yes if yes who pt's  Melina Moreno at bedside                Discharge to Facility: [id number] [place] [address] phone number for staff giving report: [phone] completed: n/a pt has [facility] Exemption Notification (HENS) completed: n/a   Discharge orders and Continuity of Care faxed to facility:  [phone]      Transportation:               Medical Transport explained with choice list offered to pt/family. Choice:Yes(no preference)  Agency used: [facility] (Vent/Trach)   time:   1:00pm      Pt/family/Nursing/Facility aware of  time:   Yes Names: pt, pt's  Melina Moreno at bedside, [facility], Teresa DORMAN, Rajesh Backer ICU [employer] [place] at [facility] [employer] form completed:  yes      Date Last IMM Given: 2022    Comments: Chart review completed. Discharge order noted. Spoke with Juan M Pina RN who states pt has required bilateral wrist restraints as she has been pulling at her trach. Spoke with Luis Chin at [address] [place] [address] via phone call who stated bilateral wrist restraints are fine and pt can't have a sitter. She is aware no sitter. She confirmed they can accept pt today at 1:00pm and they will notify  of the room once they know it.      Spoke with Cassie at [address] who stated bilateral wrist restraints during transport is okay and they will likely take the ones at bedside. Bettye Canela RN aware of the above from [place] at [address] [place]; along with  time. Met with pt and pt's  Tima Pope at bedside. Tima Pope in agreement with pt transferring to [facility] today. He is aware that LTACH will call him once they know what bed she will go in. He did not express any questions or concerns to writer. CM spoke with Bettye Canela RN and asked her to contact Respiratory Dept to be present at time of  for transfer of care when squad comes to pick pt up and they can give report and help with ventilator settings. RN verbalized understanding and states will contact Respiratory Dept. Pt is being d/c'd to [facility] [place] today. Pt's O2 sats are 95% on 45 FiO2 Vent/Trach. Discharge timeout done with [facility]. All discharge needs and concerns addressed. Discharging nurse to complete MICA, reconcile AVS, and place final copy with patient's discharge packet. Discharging RN to ensure that written prescriptions for Level II medications are sent with patient to the facility as per protocol. At 2:15pm: per [facility] at [facility] should be here shortly as they had to get another piece for their vent.  RN, pt's  at bedside and Melissa at [address] [place] [address] Dariusz made aware via voicemail

## 2022-07-19 NOTE — PROGRESS NOTES
Pulmonary & Critical Care Medicine ICU Progress Note    CC: shortness of breath     Events of Last 24 hours:   Very little pleural fluid on bedside ultrasound yesterday    Vascular lines: IV: PICC 6/19/2022       Intake/Output Summary (Last 24 hours) at 7/19/2022 0803  Last data filed at 7/19/2022 0541  Gross per 24 hour   Intake 963.74 ml   Output 1070 ml   Net -106.26 ml         MV: 6/18/2022 -6/27/22  Vent Mode: AC/VC Resp Rate (Set): 24 bmp/Vt (Set, mL): 355 mL/ /FiO2 : 45 %  No results for input(s): PHART, XQA0THD, PO2ART in the last 72 hours. IV:   sodium chloride      sodium chloride      sodium chloride 5 mL/hr at 07/10/22 0747       Vitals:  Blood pressure (!) 135/56, pulse 81, temperature 98.9 °F (37.2 °C), temperature source Bladder, resp. rate 21, height 5' 6\" (1.676 m), weight 253 lb 8.5 oz (115 kg), SpO2 97 %, not currently breastfeeding. On Vent  General: ill appearing. Eyes: PERRL. No sclera icterus. No conjunctival injection. ENT: No discharge. Pharynx clear. Neck: Trachea midline. Normal thyroid. Tracheostomy tube is present  Resp: No accessory muscle use. No crackles. No wheezing. No rhonchi. No dullness on percussion. CV: Regular rate. Regular rhythm. No mumur or rub. + edema. GI: Non-tender. Non-distended. No masses. No organomegaly. Normal bowel sounds. No hernia. Skin: Warm and dry. No nodule on exposed extremities. No rash on exposed extremities. Lymph: No cervical LAD. No supraclavicular LAD. M/S: No cyanosis. No joint deformity. No clubbing. Neuro: Awake and FC. Patellar reflexes are symmetric. Psych: No agitation, no anxiety, affect is full.      Scheduled Meds:   alteplase  1 mg IntraCATHeter Once    furosemide  40 mg IntraVENous Daily    sennosides-docusate sodium  1 tablet Oral BID    [Held by provider] heparin (porcine)  5,000 Units SubCUTAneous 3 times per day    potassium bicarb-citric acid  20 mEq Oral Once    sodium bicarbonate  650 mg Oral Daily acetylcysteine  600 mg Inhalation BID    ipratropium-albuterol  1 ampule Inhalation Q4H While awake    pantoprazole  40 mg IntraVENous BID    chlorhexidine  15 mL Mouth/Throat BID    sodium chloride flush  10 mL IntraVENous 2 times per day    atorvastatin  40 mg Oral Nightly    carbidopa-levodopa  1 tablet Oral 4x Daily    [Held by provider] DULoxetine  60 mg Oral Daily    lamoTRIgine  50 mg Oral BID    montelukast  10 mg Oral Daily    propafenone  150 mg Oral 3 times per day       Data:  CBC:   Recent Labs     07/17/22  0442 07/18/22  0500 07/19/22  0428   WBC 10.2 10.3 7.1   HGB 8.0* 7.8* 7.9*   HCT 24.6* 23.8* 23.9*   MCV 86.4 86.1 85.8    291 332     BMP:   Recent Labs     07/17/22  0442 07/18/22  0500 07/18/22  1257 07/19/22  0428    138  --  142   K 3.6 3.5 3.9 4.2   CL 94* 95*  --  99   CO2 27 29  --  31   PHOS 4.0 4.0  --  4.4   BUN 45* 47*  --  47*   CREATININE 3.1* 3.0*  --  3.1*     LIVER PROFILE:   No results for input(s): AST, ALT, LIPASE, BILIDIR, BILITOT, ALKPHOS in the last 72 hours. Invalid input(s): AMYLASE,  ALB    Microbiology:  6/17/2022 SARS-CoV-2 and influenza are negative  6/17/2022 MRSA DNA screen positive  6/17/2022 blood NG  6/17/2022 sputum NRF  6/18/2022 tracheal aspirate NRF  6/19/2022 BAL Candida  6/19/2022 respiratory viral panel negative  7/1/22 Blood cx NGTD  7/7/2022 bronchial washings and BAL Candida krusei  7/12/2022 bronchial washings no growth      Imaging:  CT chest 7/3/22  images reviewed by me and showed:   1. Multifocal bronchopneumonia, with progression since 06/17/2022.   2. Small bilateral pleural effusions  3. Narrowing of the AP diameter of the trachea  4.  Mild cardiomegaly    CXR 7/16/2022 left greater than right pleural effusion, tracheostomy tube in place    ASSESSMENT:  Life threatening acute on chronic hypoxemic respiratory failure   S/P trach and PEG on 7/15/22  Aspiration pneumonia - recurrent  Mucous plugging of bronchi  Tracheobronchomalacia

## 2022-07-19 NOTE — FLOWSHEET NOTE
07/19/22 0800   Vitals   Temp 99 °F (37.2 °C)   Temp Source Bladder   Heart Rate 88   Heart Rate Source Monitor   Resp 24   /76   MAP (mmHg) 89   BP Method Automatic   Level of Consciousness Alert (0)   MEWS Score 2   Cardiac Rhythm Sinus rhythm   Pain Assessment   Ramos-Baker Pain Rating   (intermittent grimacing)   Oxygen Therapy   SpO2 95 %   O2 Device Ventilator   FiO2  45 %   Vent Settings   Resp Rate (Set) 24 bmp   Rate Measured 24 br/min   Vt (Set, mL) 355 mL   Vt Exhaled 350 mL   PEEP/CPAP (cmH2O) 5   Pressure Support 0 cmH20   Peak Inspiratory Pressure 29 cmH2O   Vital signs stable. Shift assessment, completed, see flow sheet. RASS 0. Pt able to follow simple commands. Trache with # 8 Shiley ETT,  24 / 355 /45 %/ 5. NSR, Respirations are easy, even, and unlabored. Bilateral lung sounds diminished PEG in place with 30 ml residual, Bowel sounds active. L basillic double lumen PICC, WNL. Restraints remain in place for Pt safety. Zimmerman in place and draining clear yellow urine. Dressings changed to BUE. Call light within reach. Bed in lowest position. Bed alarm on.  updated on plan of care at bedside.  Will continue to monitor

## 2022-07-19 NOTE — PROGRESS NOTES
3-4    Epithelial Cells, UA      0 - 5 /HPF 0-1    Bacteria, UA      None Seen /HPF 2+ (A)    Sodium, Ur      Not Established mmol/L  <20   Potassium, Ur      Not Established mmol/L  26.3   Chloride      Not Established mmol/L  <20       Assessment/     - Acute kidney injury - multi-factorial ATN injury in setting of aspiration PNA, hypotension   Volume overloaded    Non-oliguric and responding to IV lasix, BP stable at this point    Creatinine stabilized ~3 range    - Chronic kidney disease stage 3b - background DM/HTN/CHF              SCr 1.6              Follows with [facility]     - Acute hypoxemic respiratory failure/Aspiration PNA/Pulmonary edema    On vent, improved FiO2 requirements over the last 24 hours   S/p bronch    Planning for trach      - Diastolic CHF     - Atrial fibrillation    - Anemia - prn prbc's     - Hypertension   Range is now low but stable    - Hypokalemia, improved. - Metabolic acidosis, resolved    - Dysphagia, patient has been started on some puréed food    Plan/     -Lasix 40 mg IV daily from 7/18, transitioning to lasix 40 mg po qdaily tomorrow  -Trend labs, bp's, & urine output    ____________________________________  Danita Perez MD  [facility]  [email or web address]  Office: [phone]

## 2022-07-19 NOTE — PROGRESS NOTES
Dr. Nina Romero made aware of VBG results. Orders received to drop respiratory rate to 18 and repeat VBG in 20 mins. Will continue to monitor.

## 2022-07-19 NOTE — PROGRESS NOTES
07/19/22 0241   NICU Vent Information   Vent Type 980   Vent Mode AC/VC   Vt (Set, mL) 355 mL   Vt Exhaled 349 mL   Resp Rate (Set) 24 bmp   Rate Measured 24 br/min   Minute Volume 8.41 Liters   Peak Flow 60 L/min   Pressure Support 0 cmH20   FiO2  45 %   Peak Inspiratory Pressure 29 cmH2O   I:E Ratio 1:2.90   Sensitivity 3   PEEP/CPAP (cmH2O) 5   Mean Airway Pressure 11 cmH20   Plateau Pressure 24 LIA85   Cough/Sputum   Sputum How Obtained Suctioned;Tracheal   Cough Productive   Sputum Amount Small   Sputum Color Creamy   Tenacity Thick   Breath Sounds   Right Upper Lobe Rhonchi   Right Middle Lobe Rhonchi   Right Lower Lobe Rhonchi   Left Upper Lobe Rhonchi   Left Lower Lobe Rhonchi   Additional Respiratory Assessments   Heart Rate 79   Resp 21   SpO2 94 %   Position Semi-Mccollum's   Humidification Source Heated wire   Humidification Temp 37   Circuit Condensation Drained   Vent Alarm Settings   High Pressure  50 cmH2O   Low Minute Volume Alarm 4 L/min   RR High 40 br/min

## 2022-07-19 NOTE — PROGRESS NOTES
4 Eyes Skin Assessment     The patient is being assess for   Assessment and bath    I agree that 2 RN's have performed a thorough Head to Toe Skin Assessment on the patient. ALL assessment sites listed below have been assessed. Areas assessed for pressure by both nurses:   [x]   Head, Face, and Ears   [x]   Shoulders, Back, and Chest, Abdomen  [x]   Arms, Elbows, and Hands   [x]   Coccyx, Sacrum, and Ischium  [x]   Legs, Feet, and Heels        Skin Assessed Under all Medical Devices by both nurses:  SCD's              All Mepilex Borders were peeled back and area peeked at by both nurses:  Yes  Please list where Mepilex Borders are located:  coccyx               **SHARE this note so that the co-signing nurse is able to place an eSignature**    Co-signer eSignature: Electronically signed by Naomie Shanks RN on 7/19/22 at 6:44 AM EDT    Does the Patient have Skin Breakdown related to pressure?   No     (Insert Photo here)         Vineet Prevention initiated:  Yes   Wound Care Orders initiated:  Yes      [id number]  nurse consulted for Pressure Injury (Stage 3,4, Unstageable, DTI, NWPT, Complex wounds)and New or Established Ostomies:  NA      Primary Nurse eSignature: Electronically signed by Della Curtis RN on 7/19/22 at 6:40 AM EDT

## 2022-07-19 NOTE — DISCHARGE SUMMARY
Name:  Sis Nguyen  Room:  3014/3014-01  MRN:    [id number]    Discharge Summary      This discharge summary is in conjunction with a complete physical exam done on the day of discharge. Discharging Physician: Destin Adair MD      Admit: 6/17/2022  Discharge:  7/19/2022     Diagnoses this Admission    Principal Problem:    Septicemia [facility])  Active Problems:    Aspiration pneumonia of left lower lobe (HCC)    Severe protein-calorie malnutrition (HCC)    Chronic anemia    Thrush    MRSA colonization    BETSY (obstructive sleep apnea)    Metabolic acidosis    Pulmonary congestion    Mucus plugging of bronchi    Atrial fibrillation, chronic (HCC)    Mild protein-calorie malnutrition (HCC)    Parkinson disease ([facility] [address]    Acute respiratory failure with hypoxia (HCC)    Chronic kidney disease    Acute kidney injury superimposed on CKD ([facility] [address]  Resolved Problems:    Shortness of breath          Procedures (Please Review Full Report for Details)    Endotracheal intubation x3  Tracheostomy  PEG tube placement  Line placement including PICC line    Consults    IP CONSULT TO HEART FAILURE NURSE/COORDINATOR  IP CONSULT TO DIETITIAN  IP CONSULT TO PHARMACY  IP CONSULT TO PULMONOLOGY  IP CONSULT TO DIETITIAN  IP CONSULT TO NEPHROLOGY  IP CONSULT TO GI  IP CONSULT TO OTOLARYNGOLOGY  IP CONSULT TO GI  IP CONSULT TO PHARMACY  IP CONSULT TO PULMONOLOGY  IP CONSULT TO INFECTIOUS DISEASES  IP CONSULT TO PALLIATIVE CARE  IP CONSULT TO GENERAL SURGERY      HPI:    The patient is a 80 y.o. female Hx of Parkinson's disease, Afib on Eliquis, BETSY, CKD, lives at home with , who presents with SOB and cough. Pt reports cough and SOB and throat pain and odynophagia and dysphagia coughing with every meal. Her cough is deep and rhonchorous but she is struggling to bring up any secretions. She is not sure about fever. She was initially admitted to [facility] with concerns for aspiration PNA and new hypoxia.      This am she is in resp distress, very poor and weak cough, O2 sats 70s despite 10-12l/min O2 - transitioned to NRB mask with sats improved to 92% and then transitioned to vapotherm. I ordered to transfer to ICU due to rapid worsening of oxygenation and pt struggling managing own secretion with very weak cough. Ordered 3% saline nebs and repeat CXR and Pulm evaluation. Given rapid progression of hypoxia and clinical status I strongly suspect mucus plugging. She is to stay strict NPO. Physical Exam at Discharge:  BP (!) 116/48   Pulse (!) 103   Temp 99 °F (37.2 °C) (Bladder)   Resp 17   Ht 5' 6\" (1.676 m)   Wt 253 lb 8.5 oz (115 kg)   SpO2 91%   BMI 40.92 kg/m²     General: elderly female obese,  arousable and on vent support  Mucous Membranes:  Pink , anicteric  Left opaque cornea  Tracheostomy noted  Neck: No JVD, no carotid bruit, no thyromegaly  Chest: Diminished breath sounds with resolving left sided  rhonchi. No wheezes  Cardiovascular:  RRR S1S2 heard, no murmurs or gallops  Abdomen:  Soft, obese  undistended, non tender, no organomegaly, BS present  PEG noted  Extremities: Left UE picc noted  No edema or cyanosis. Distal pulses well felt  Neurological : arousable, drowsy, moving ext         Hospital Course  Acute Hypox Resp Failure  Aspiration PNA  Treated with  IV Zosyn and vancomycin. Changed Zosyn to cefepime. DCd vancomycin 6/22. Finished 10 days of Vanco and cefepime  Intubated 6/18. Extubated on 6/27/2022.  -On BiPAP. Alternate BiPAP with Airvo. - SLP involved. - resp culture from BAL is growing candida - started diflucan on 7/1/2022              - spiking high fever 7/1 - back on Abx with Vanc and merrem IV.                            - poor physical condition limiting clearance of resp secretions- sp repeat bronch with clearing of mucous on 7/7 , 7/12, 7/13  . On inhaled Mucomyst   eventually needed tracheostomy and PEG placement on 7/15  ID following. Repeat cultures.   Hold off on antibiotics for now per ID  Can start weaning vent support and sedation at LTAC     Left pleural effusion  For bedside us today by pulm. Not enough fluid to drain. Sepsis POA - due to above. Recurrent with aspiration  Now off antibiotics  Currently weaned off Levophed     Dysphagia. Abnormal CT  Cough with every meal intake per family report. Underlying Parkinson's. - continues to struggle with secretions. - CT abd pelvis 7/2 - actually fairly unremarkable. Melena  GI bleed  - drop in H/H  -GI consulted  -H&H held stable.  -Has since resumed eliquis  - IV PPI BID  Drop in H&H.  1 unit of red cells transfused 7/15      FARHEEN on CKD IIIB  -Likely secondary to ATN in the setting of pneumonia, hypotension and elevated vancomycin levels  -Had lasix gtt earlier this admission.  -Back on IV Lasix now, creatinine  Monitor BMP  Hold Lasix  Creatinine stable. Nephrology consulted  IV Lasix with IV albumin per nephro(held currently by critical care)     Parkinson's disease  - on Sinemet. Afib Paroxysmal  -Rythmol  -Eliquis held - for possible procedure. resume at discharge. Hx of HTN - nifedipine discontinued with hypotension issues  Resume other meds as needed             CBC:   Recent Labs     07/17/22  0442 07/18/22  0500 07/19/22  0428   WBC 10.2 10.3 7.1   HGB 8.0* 7.8* 7.9*   HCT 24.6* 23.8* 23.9*   MCV 86.4 86.1 85.8    291 332     BMP:   Recent Labs     07/17/22  0442 07/18/22  0500 07/18/22  1257 07/19/22  0428    138  --  142   K 3.6 3.5 3.9 4.2   CL 94* 95*  --  99   CO2 27 29  --  31   PHOS 4.0 4.0  --  4.4   BUN 45* 47*  --  47*   CREATININE 3.1* 3.0*  --  3.1*             XR CHEST PORTABLE   Preliminary Result   Stable examination with perihilar airspace opacities and bilateral left   greater right pleural effusions that may represent pneumonia or pulmonary   edema. Removal of ET tube and enteric tube with placement of tracheostomy tube.    Otherwise stable exam.         XR CHEST PORTABLE   Final Result   No significant improvement. Moderate diffuse infiltrates and/or congestion   identified in the lungs. XR CHEST PORTABLE   Final Result   Bilateral pleuroparenchymal disease, increased on the right         XR CHEST PORTABLE   Final Result   Interval worsening with increased pneumonia/consolidation left lung and   mild-to-moderate congestion. XR CHEST PORTABLE   Final Result   Lines and tubes in satisfactory position with ill-defined opacifications in   the left lung base and small to moderate size residual left pleural effusion. XR CHEST PORTABLE   Final Result   1. Endotracheal tube terminates in appropriate position above the nirali. The enteric tube courses off the field of view in the upper abdomen. 2. Improved aeration of the left upper lung field with persistent   consolidation and/or effusion opacifying the left mid and lower lung fields. XR CHEST PORTABLE   Final Result   Persistent complete opacification of the left hemithorax. XR CHEST PORTABLE   Final Result   Interval complete opacification of the left hemithorax. CT CHEST WO CONTRAST   Final Result   1. Multifocal bronchopneumonia, with progression since 06/17/2022.   2. Small bilateral pleural effusions. 3. Narrowing of the AP diameter of the trachea. 4. Mild cardiomegaly. XR CHEST PORTABLE   Final Result   Airspace disease within the mid to lower lung zones is slightly increased as   compared to prior. Possible small left pleural effusion. CT ABDOMEN PELVIS WO CONTRAST Additional Contrast? None   Final Result   1. Nonspecific, nonobstructive gaseous bowel pattern. 2. Extensive diverticulosis of the left colon, lacking acute inflammatory   changes. 3. Bibasilar consolidations, atelectasis versus pneumonia. Pleural effusions   have resolved since 06/23/2022. XR ABDOMEN (KUB) (SINGLE AP VIEW)   Final Result   1. Gaseous distention of the stomach, otherwise, unremarkable bowel gas   pattern. XR CHEST PORTABLE   Final Result   Stable chest.  Persistent left lower lobe opacification with probable   effusion. XR CHEST PORTABLE   Final Result   Persistent left lower lobe opacification and effusion. Interval clearing of   perihilar edema and right basilar opacification and effusion. XR CHEST PORTABLE   Final Result   No significant interval change in bilateral airspace disease and small   effusions. Unchanged positioning of support devices. XR CHEST PORTABLE   Final Result   Increasing basilar opacities with probable small effusions. Unchanged positioning of support devices. XR CHEST PORTABLE   Final Result   1. No significant change. XR CHEST PORTABLE   Final Result   No significant interval change in basilar predominant airspace disease and   small left greater than right pleural effusions. Pulmonary vascular   congestion. CT ABDOMEN PELVIS WO CONTRAST Additional Contrast? None   Final Result   Colonic diverticulosis without acute diverticulitis. Nonspecific rectal wall   thickening. Partially visualized pleural effusions with adjacent compressive   atelectasis. XR CHEST PORTABLE   Final Result   Again identified is bibasilar airspace disease with small pleural effusions   and mild increased right perihilar infiltrates. This may be related to   atelectasis or pneumonia. Cardiomegaly. XR CHEST PORTABLE   Final Result   Stable examination with bilateral small effusions and bibasilar atelectasis. Stable ET tube and enteric tube. XR CHEST PORTABLE   Final Result   No acute process. Bibasilar hypoaeration      Stable cardiomegaly         IR PICC WO SQ PORT/PUMP > 5 YEARS   Final Result      XR CHEST PORTABLE   Final Result   Supportive tubing projects in stable position.       Partial regression of atelectasis in the right lung base.  Persisting   consolidation or or atelectasis in the left lung base. XR CHEST PORTABLE   Final Result   1. Endotracheal tube terminates in appropriate position above the nirali. 2.  Enteric tube terminates in the body of the stomach. 3.  Basilar opacities again demonstrated, right greater than left. Interval   improved aeration of the left lung base. XR CHEST PORTABLE   Final Result   Increasing opacity in the right base, lobar atelectasis versus accumulating   right pleural effusion. Combination of both entities or underlying pneumonia   also possible. Persisting left basilar airspace disease and small pleural effusion. XR CHEST PORTABLE   Final Result   Hazy bilateral lobe airspace opacities and small left pleural effusion. This   could represent atelectasis or pneumonia in the appropriate clinical setting. CT CHEST WO CONTRAST   Final Result   1. Respiratory motion in the lower lungs. The right base has linear and   dependent atelectasis. The left does have atelectatic changes but could also   have some patchy opacities from pneumonia or aspiration at the left base. 2.  There is no pleural effusion or pneumothorax. 3.  Narrowing of the AP diameter of the trachea with bulging of the   membranous portion due to the gas distended but nondilated esophagus. XR CHEST PORTABLE   Final Result   1. Bibasilar heterogeneous opacities may represent subsegmental atelectasis   and/or mild pulmonary edema. Underlying infection is difficult to exclude. Short-term follow-up PA and lateral chest radiographs may be helpful for   further evaluation. 2.  Cardiomegaly.                 Discharge Medications     Medication List        START taking these medications      acetylcysteine 20 % nebulizer solution  Commonly known as: MUCOMYST  Inhale 3 mLs into the lungs 2 times daily     chlorhexidine 0.12 % solution  Commonly known as: PERIDEX  Take 15 mLs by mouth in the morning and 15 mLs before bedtime. Do all this for 14 days. oxyCODONE 5 MG immediate release tablet  Commonly known as: ROXICODONE  Take 1 tablet by mouth every 4 hours as needed for Pain for up to 3 days. pantoprazole 40 MG tablet  Commonly known as: PROTONIX  Take 1 tablet by mouth in the morning and 1 tablet in the evening. Take before meals. CHANGE how you take these medications      furosemide 40 MG tablet  Commonly known as: LASIX  Take 1 tablet by mouth in the morning. What changed: See the new instructions. metoprolol tartrate 50 MG tablet  Commonly known as: LOPRESSOR  Take 0.5 tablets by mouth in the morning and 0.5 tablets before bedtime. What changed: See the new instructions.             CONTINUE taking these medications      albuterol sulfate  (90 Base) MCG/ACT inhaler  Commonly known as: PROVENTIL;VENTOLIN;PROAIR  INHALE 2 PUFFS INTO THE LUNGS EVERY 6 HOURS AS NEEDED FOR WHEEZING     Alpha-Lipoic Acid 300 MG Tabs     apixaban 2.5 MG Tabs tablet  Commonly known as: Eliquis  Take 1 tablet by mouth 2 times daily     atorvastatin 40 MG tablet  Commonly known as: LIPITOR  TAKE 1 AND 1/2 TABLETS EVERY DAY     Breo Ellipta 100-25 MCG/INH Aepb inhaler  Generic drug: fluticasone-vilanterol  INHALE 1 PUFF EVERY DAY     carbidopa-levodopa  MG per tablet  Commonly known as: SINEMET     cetirizine 10 MG tablet  Commonly known as: ZYRTEC  TAKE 1/2 TABLET BY MOUTH EVERY DAY     DULoxetine 60 MG extended release capsule  Commonly known as: CYMBALTA  TAKE 1 CAPSULE EVERY DAY     ferrous sulfate 325 (65 Fe) MG tablet  Commonly known as: IRON 325     fluticasone 50 MCG/ACT nasal spray  Commonly known as: FLONASE  USE 2 PUFFS IN EACH NOSTRIL DAILY     Handicap Placard Misc  by Does not apply route DX: G20  parkinson's disease  Exp: 9/1/2024     ipratropium-albuterol 0.5-2.5 (3) MG/3ML Soln nebulizer solution  Commonly known as: DuoNeb  Inhale 3 mLs into the lungs every 6 hours as needed for Shortness of Breath DX: J45.40     lamoTRIgine 25 MG tablet  Commonly known as: LAMICTAL     lidocaine 5 % ointment  Commonly known as: XYLOCAINE     montelukast 10 MG tablet  Commonly known as: SINGULAIR  TAKE 1 TABLET EVERY DAY     propafenone 150 MG tablet  Commonly known as: RYTHMOL  TAKE 1 TABLET BY MOUTH EVERY 8 HOURS     therapeutic multivitamin-minerals tablet            STOP taking these medications      NIFEdipine 90 MG extended release tablet  Commonly known as: PROCARDIA XL     valsartan 80 MG tablet  Commonly known as: DIOVAN     zinc oxide 16 % Oint ointment               Where to Get Your Medications        These medications were [facility] to [id number], [address] [place] [address] [address], [facility]      Phone: [phone]   cetirizine 10 MG tablet       You can get these medications from any pharmacy    Bring a paper prescription for each of these medications  oxyCODONE 5 MG immediate release tablet       Information about where to get these medications is not yet available    Ask your nurse or doctor about these medications  acetylcysteine 20 % nebulizer solution  chlorhexidine 0.12 % solution  furosemide 40 MG tablet  metoprolol tartrate 50 MG tablet  pantoprazole 40 MG tablet           Discharge Condition/Location: Stable. Limited prognosis. Follow Up: Follow up with LTAC doctor.       More than 30 mts spent      Fox Esquivel MD 7/19/2022 12:22 PM

## 2022-07-19 NOTE — DISCHARGE INSTR - COC
Continuity of Care Form    Patient Name: Kelly Chandra   :  1940  MRN:  [id number]    [facility] date:  2022  Discharge date: 2022    Code Status Order: Full Code   Advance Directives:   [address] [facility] Documentation       Date/Time Healthcare Directive Type of Healthcare Directive Copy in [address] Agent's Name Healthcare Agent's Phone Number    22 1044 Yes, patient has an advance directive for healthcare treatment Living will -- -- -- --            Admitting Physician:  Justina Vazquez DO  PCP: Brock Barthel, MD    Discharging Nurse: [facility] Unit/Room#: 1678/5454-01  Discharging Unit Phone Number: [phone]    Emergency Contact:   Extended Emergency Contact Information  Primary Emergency Contact:  Wesly Bowen  Address: [address] [place] [address]  Home Phone: [phone]  Mobile Phone: [phone]  Relation: Spouse    Past Surgical History:  Past Surgical History:   Procedure Laterality Date    APPENDECTOMY      BRONCHOSCOPY N/A 2022    BRONCHOSCOPY ALVEOLAR LAVAGE performed by John Ovalles MD at [place] [address]  2022    BRONCHOSCOPY THERAPUTIC ASPIRATION INITIAL performed by John Ovalles MD at [address] [place] [address]      left    CATARACT REMOVAL      CERVICAL FUSION  2010    CHOLECYSTECTOMY      COLONOSCOPY N/A 3/8/2020    COLONOSCOPY FLEXIBLE W/ DECOMPRESSION performed by Alma Bean MD at [facility] (CERVIX STATUS UNKNOWN)      IR MIDLINE CATH  10/8/2021    IR MIDLINE CATH 10/8/2021 [address] SPECIAL PROCEDURES    JOINT REPLACEMENT Bilateral     KNEE SURGERY      replacement x 2    OVARY REMOVAL      SHOULDER SURGERY      TONSILLECTOMY      TRACHEOSTOMY N/A 7/15/2022    TRACHEOTOMY WITH PERCUTANEOUS ENDOSCOPIC GASTROSTOMY TUBE PLACEMENT performed by [facility] Marla Liang MD at [address] [facility] ENDOSCOPY  03/17/2020    Esophagitis    UPPER GASTROINTESTINAL ENDOSCOPY N/A 3/17/2020    EGD performed by Carmen Betts MD at [address]         Immunization History:   Immunization History   Administered Date(s) Administered    COVID-19, PFIZER GRAY top, DO NOT Dilute, (age 15 y+), IM, 30 mcg/0.3 mL 04/08/2022    COVID-19, PFIZER PURPLE top, DILUTE for use, (age 15 y+), 30mcg/0.3mL 02/10/2021, 02/10/2021, 03/03/2021, 03/03/2021, 11/09/2021    Hepatitis A 05/08/2019    Hepatitis A Adult (Havrix, Vaqta) 05/08/2019, 05/08/2019, 12/17/2019    Hepatitis A Adult (Vaqta) 05/08/2019    Influenza A (Y8R4-44) Vaccine PF IM 01/23/2010, 01/23/2010    Influenza Vaccine, unspecified formulation 08/09/2016, 09/01/2017    Influenza Virus Vaccine 09/27/2011, 10/01/2012, 08/09/2016    Influenza Whole 09/02/2015    Influenza, High Dose (Fluzone 65 yrs and older) 09/09/2015, 09/09/2015, 08/15/2016, 08/15/2016, 09/15/2017, 09/14/2018, 09/21/2020    Influenza, High-dose, Jamas Jointer, 65 yrs +, IM (Fluzone) 09/21/2020, 09/21/2020    Influenza, Quadv, adjuvanted, 65 yrs +, IM, PF (Fluad) 10/20/2021    Influenza, Triv, inactivated, subunit, adjuvanted, IM (Fluad 65 yrs and older) 09/27/2019    Pneumococcal Conjugate 13-valent (Aimyltq16) 07/28/2015    Pneumococcal Polysaccharide (Lityzamcm45) 11/26/2007    Tdap (Boostrix, Adacel) 03/30/2021    Zoster Recombinant (Shingrix) 05/08/2019, 05/08/2019, 12/17/2019       Active Problems:  Patient Active Problem List   Diagnosis Code    GERD (gastroesophageal reflux disease) K21.9    Essential hypertension I10    Hyperlipidemia E78.5    Restless leg syndrome G25.81    Anemia of chronic disease D63.8    Postural dizziness R42    Moderate persistent asthma J45.40    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-sided sciatica M54.41, G89.29    Morbid obesity with BMI of 40.0-44.9, adult ([facility]) E66.01, Z68.41    Type 2 diabetes mellitus with stage 4 chronic kidney disease, without long-term current use of insulin ([facility]) E11.22, N18.4    CAD (coronary artery disease) I25.10    Parkinson disease ([facility]) G20    Severe obstructive sleep apnea G47.33    Hypothyroidism E03.9    Hepatic steatosis K76.0    Diverticulosis K57.90    Blind left eye H54.40    L carotid artery stenosis s/p CEA I65.29    Septicemia ([facility]) A41.9    PAF (paroxysmal atrial fibrillation) ([facility]) I48.0    COPD (chronic obstructive pulmonary disease) ([facility]) J44.9    Acute respiratory failure with hypoxia ([facility]) I16.88    Diastolic congestive heart failure ([facility]) I50.30    Partial symptomatic epilepsy with complex partial seizures, not intractable, without status epilepticus ([facility]) G40.209    Chronic kidney disease N18.9    Acute kidney injury superimposed on CKD ([address] N17.9, N18.9    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    Sialadenitis K11.20    Chest pain R07.9    Pulmonary HTN ([facility]) I27.20    Mitral valve insufficiency I34.0    C. difficile colitis A04.72    Laceration of left lower extremity S81.812A    Pulmonary nodules R91.8    Sensorineural hearing loss, bilateral H90.3    Aspiration pneumonia of left lower lobe ([facility]) J69.0    Severe protein-calorie malnutrition ([facility]) E43    Chronic anemia D64.9    Thrush B37.0    MRSA colonization Z22.322    BETSY (obstructive sleep apnea) P10.10    Metabolic acidosis K66.0    Pulmonary congestion R09.89    Mucus plugging of bronchi T17.500A    Atrial fibrillation, chronic ([facility]) I48.20    Mild protein-calorie malnutrition ([facility]) E44.1       Isolation/Infection:   Isolation            Contact          Patient Infection Status       Infection Onset Added Last Indicated Last Indicated By Review Planned Expiration Resolved Resolved By    MRSA 06/17/22 06/18/22 06/17/22 MRSA DNA Probe, Nasal        Resolved    C-diff Rule Out 06/30/22 06/30/22 06/30/22 Clostridium difficile toxin/antigen (Ordered)   07/05/22 Bhargavi Spaulding RN Order     COVID-19 (Rule Out) 22 COVID-19 & Influenza Combo (Ordered)   22 Rule-Out Test Resulted    C-diff Rule Out 10/07/21 10/07/21 10/07/21 GI Bacterial Pathogens By PCR (Ordered)   10/07/21 Rule-Out Test Resulted            Nurse Assessment:  Last Vital Signs: BP (!) 135/56   Pulse 81   Temp 99 °F (37.2 °C) (Bladder)   Resp 21   Ht 5' 6\" (1.676 m)   Wt 253 lb 8.5 oz (115 kg)   SpO2 97%   BMI 40.92 kg/m²     Last documented pain score (0-10 scale): Pain Level: 2  Last Weight:   Wt Readings from Last 1 Encounters:   22 253 lb 8.5 oz (115 kg)     Mental Status:  alert and coherent    IV Access:  - None    Nursing Mobility/ADLs:  Walking   Dependent  Transfer  Dependent  Bathing  Dependent  Dressing  Dependent  Toileting  Dependent  Feeding  Dependent  Med Admin  Dependent  Med Delivery   crushed    Wound Care Documentation and Therapy:  Incision 07/15/22 Abdomen Left;Medial;Upper (Active)   Dressing Status Clean;Dry; Intact 22 0800   Dressing/Treatment Dry dressing 22 0800   Drainage Amount None 22 0800   Odor None 07/15/22 2000   Annmarie-incision Assessment Intact 22 0800   Number of days: 3       Incision 07/15/22 Throat (Active)   Dressing Status Clean;Dry; Intact 22 0800   Dressing/Treatment Dry dressing 22 0800   Drainage Amount None 22 0800   Odor None 07/15/22 2000   Annmarie-incision Assessment Intact 22 0800   Number of days: 3        Elimination:  Continence: Bowel: No  Bladder: No  Urinary Catheter:  Insertion Date:     Colostomy/Ileostomy/Ileal Conduit: No  [REMOVED] Rectal Tube-Stool Appearance: Watery  [REMOVED] Rectal Tube-Stool Color: Kiarra Bellow  [REMOVED] Rectal Tube-Stool Amount: Medium    Date of Last BM: 2022    Intake/Output Summary (Last 24 hours) at 2022 0822  Last data filed at 2022 0800  Gross per 24 hour   Intake 1032.74 ml   Output 1220 ml   Net -187.26 ml     I/O last 3 completed shifts: In: 963.7 [I.V.:38.4; NG/GT:818; IV Piggyback:107.4]  Out: 1420 [Urine:1420]    Safety Concerns: At Risk for Falls and removal of equipment    Impairments/Disabilities:      Speech and Vision    Nutrition Therapy:  Current Nutrition Therapy:   - Tube Feedings:  Renal    Routes of Feeding: Gastrostomy Tube  Liquids: No Liquids  Daily Fluid Restriction: no  Last Modified Barium Swallow with Video (Video Swallowing Test): not done    Treatments at the Time of Hospital Discharge:   Respiratory Treatments: PRN breathing TX  Oxygen Therapy:  is not on home oxygen therapy. Ventilator:    - Ventilator Settings:    Vt (Set, mL): 355 mL  Resp Rate (Set): 24 bmp  FiO2 : 45 %    PEEP/CPAP (cmH2O): 5  Pressure Support: 0 cmH20    Rehab Therapies: Physical Therapy, Occupational Therapy, and Speech/Language Therapy  Weight Bearing Status/Restrictions: No weight bearing restrictions  Other Medical Equipment (for information only, NOT a DME order):  hospital bed  Other Treatments: ***    Patient's personal belongings (please select all that are sent with patient):  Destini Chavez, given to  Aquilino Oconnor. RN SIGNATURE:  Electronically signed by Amberly Galloway RN on 7/19/22 at 10:40 AM EDT    CASE MANAGEMENT/SOCIAL WORK SECTION    Inpatient Status Date: 06/17/2022    Readmission Risk Assessment Score:  Readmission Risk              Risk of Unplanned Readmission:  [id number]           Discharging to Facility/ Agency   Name: [address] [place] [address]   Address: [place] [address]  Phone: [phone]  Fax:    / signature: Electronically signed by MICHAEL Pickard, KERVIN-S on 7/19/22 at 8:40 AM EDT    PHYSICIAN SECTION    Prognosis: {Prognosis:[id number]}    Condition at Discharge: Stable    Rehab Potential (if transferring to Rehab): Fair    Recommended Labs or Other Treatments After Discharge: Wean of vent.  Monitor H and H and renal function    Physician Certification: I certify the above information and transfer of Alyssia Morris  is necessary for the continuing treatment of the diagnosis listed and that she requires LTAC for greater 30 days.      Update Admission H&P: No change in H&P    PHYSICIAN SIGNATURE:  Electronically signed by Lily Stevens MD on 7/19/22 at 8:23 AM EDT

## 2022-07-19 NOTE — PROGRESS NOTES
RT Inhaler-Nebulizer Bronchodilator Protocol Note    There is a bronchodilator order in the chart from a provider indicating to follow the RT Bronchodilator Protocol and there is an Initiate RT Inhaler-Nebulizer Bronchodilator Protocol order as well (see protocol at bottom of note). CXR Findings:  No results found. The findings from the last RT Protocol Assessment were as follows:   History Pulmonary Disease: (P) Chronic pulmonary disease  Respiratory Pattern: (P) Dyspnea on exertion or RR 21-25 bpm  Breath Sounds: (P) Slightly diminished and/or crackles  Cough: (P) Weak, non-productive  Indication for Bronchodilator Therapy: (P) Mucolytic ordered  Bronchodilator Assessment Score: (P) 9    Aerosolized bronchodilator medication orders have been revised according to the RT Inhaler-Nebulizer Bronchodilator Protocol below. Respiratory Therapist to perform RT Therapy Protocol Assessment initially then follow the protocol. Repeat RT Therapy Protocol Assessment PRN for score 0-3 or on second treatment, BID, and PRN for scores above 3. No Indications - adjust the frequency to every 6 hours PRN wheezing or bronchospasm, if no treatments needed after 48 hours then discontinue using Per Protocol order mode. If indication present, adjust the RT bronchodilator orders based on the Bronchodilator Assessment Score as indicated below. Use Inhaler orders unless patient has one or more of the following: on home nebulizer, not able to hold breath for 10 seconds, is not alert and oriented, cannot activate and use MDI correctly, or respiratory rate 25 breaths per minute or more, then use the equivalent nebulizer order(s) with same Frequency and PRN reasons based on the score. If a patient is on this medication at home then do not decrease Frequency below that used at home.     0-3 - enter or revise RT bronchodilator order(s) to equivalent RT Bronchodilator order with Frequency of every 4 hours PRN for wheezing or increased work of breathing using Per Protocol order mode. 4-6 - enter or revise RT Bronchodilator order(s) to two equivalent RT bronchodilator orders with one order with BID Frequency and one order with Frequency of every 4 hours PRN wheezing or increased work of breathing using Per Protocol order mode. 7-10 - enter or revise RT Bronchodilator order(s) to two equivalent RT bronchodilator orders with one order with TID Frequency and one order with Frequency of every 4 hours PRN wheezing or increased work of breathing using Per Protocol order mode. 11-13 - enter or revise RT Bronchodilator order(s) to one equivalent RT bronchodilator order with QID Frequency and an Albuterol order with Frequency of every 4 hours PRN wheezing or increased work of breathing using Per Protocol order mode. Greater than 13 - enter or revise RT Bronchodilator order(s) to one equivalent RT bronchodilator order with every 4 hours Frequency and an Albuterol order with Frequency of every 2 hours PRN wheezing or increased work of breathing using Per Protocol order mode.          Electronically signed by Tomas Guzman RCP on 7/19/2022 at 9:16 AM

## 2022-07-20 ENCOUNTER — TELEPHONE (OUTPATIENT)
Dept: FAMILY MEDICINE CLINIC | Age: 82
End: 2022-07-20

## 2022-07-20 NOTE — TELEPHONE ENCOUNTER
Michael [facility] Transitions Initial Follow Up Call    Outreach made within 2 business days of discharge: Yes    Patient: Jones Ho Patient : 1940   MRN: [id number]  Reason for Admission: There are no discharge diagnoses documented for the most recent discharge. Discharge Date: 22       Spoke with: Patient transferred to  rehab at [facility]    Discharge department/facility: [address] services provided:  Obtained and reviewed discharge summary and/or continuity of care documents    TCM Interactive Patient Contact:  Was patient able to fill all prescriptions: Yes  Was patient instructed to bring all medications to the follow-up visit: Yes  Is patient taking all medications as directed in the discharge summary?  Yes  Does patient understand their discharge instructions: Yes  Does patient have questions or concerns that need addressed prior to 7-14 day follow up office visit: no    Scheduled appointment with PCP within 7-14 days    Follow Up  Future Appointments   Date Time Provider Ktay Booker   2022 10:00 AM MAHAD Bashir - [facility]   2022  1:30 PM Carltio Marrufo MD [address], RN

## 2022-07-22 NOTE — TELEPHONE ENCOUNTER
Per hospital notes, pt was discharged to [address] [place] [address]. Pt had a CT chest 7/3/22 while in the ER. Okay to follow up when pt is d/c from [facility] to r/s f/u appt with Dr. Maye Salmon? Narrative   EXAMINATION:   CT OF THE CHEST WITHOUT CONTRAST 7/3/2022 11:43 am       TECHNIQUE:   CT of the chest was performed without the administration of intravenous   contrast. Multiplanar reformatted images are provided for review. Automated   exposure control, iterative reconstruction, and/or weight based adjustment of   the mA/kV was utilized to reduce the radiation dose to as low as reasonably   achievable. COMPARISON:   07/03/2022 chest x-ray       CT chest, 06/17/2022       HISTORY:   ORDERING SYSTEM PROVIDED HISTORY: left mid lower chest pain. large volume   secretions. TECHNOLOGIST PROVIDED HISTORY:   Reason for exam:->left mid lower chest pain. large volume secretions. FINDINGS:   Mediastinum: Left PICC terminates at the junction of the innominate-caval   confluence. There is an 8 mm paratracheal node. There are similar   additional mediastinal nodes. There is no interval adenopathy. The heart is mildly enlarged. There is moderate thoracic aortic   calcification, without aneurysm. There is moderate tracheobronchial   calcification. There is narrowing of the AP diameter of the trachea, similar   to baseline. Lungs/pleura: Bronchial wall thickening is noted. There is multifocal   airspace disease, with areas of more concentrated consolidation in the   posterior left upper lobe/lingula and dependent lower lobes. Small airway   opacities are noted, for instance in the upper lobes. Small bilateral   pleural effusions. Upper Abdomen: The adrenal glands and upper abdomen are unremarkable. Soft Tissues/Bones: No acute osseous injury is identified. Multilevel   spondylosis. Chest wall unremarkable. Impression   1.  Multifocal bronchopneumonia, with progression since 06/17/2022.   2. Small bilateral pleural effusions. 3. Narrowing of the AP diameter of the trachea. 4. Mild cardiomegaly.

## 2022-08-08 LAB
FUNGUS (MYCOLOGY) CULTURE: ABNORMAL
FUNGUS STAIN: ABNORMAL
FUNGUS STAIN: ABNORMAL
ORGANISM: ABNORMAL

## 2022-08-12 ENCOUNTER — TELEPHONE (OUTPATIENT)
Dept: FAMILY MEDICINE CLINIC | Age: 82
End: 2022-08-12

## 2022-08-12 NOTE — TELEPHONE ENCOUNTER
Spoke with pt spouse whom states that pt is still in [facility] for another 3-4 days then will be going to a rehab facility. Will continue to follow up.

## 2022-08-15 ENCOUNTER — TELEPHONE (OUTPATIENT)
Dept: FAMILY MEDICINE CLINIC | Age: 82
End: 2022-08-15

## 2022-08-15 DIAGNOSIS — I50.32 CHRONIC DIASTOLIC CONGESTIVE HEART FAILURE (HCC): ICD-10-CM

## 2022-08-15 DIAGNOSIS — R29.898 WEAKNESS OF BOTH LOWER EXTREMITIES: Primary | ICD-10-CM

## 2022-08-15 LAB
FUNGUS (MYCOLOGY) CULTURE: NORMAL
FUNGUS STAIN: NORMAL

## 2022-08-15 NOTE — TELEPHONE ENCOUNTER
Patients spouse called states pt's lift chair broke and needs a new prescription written before weds, 08/17/22. D/t that's when Ryan Valentin is going to deliver it and with the script patient will not have to pay taxes on it.  Pt's spouse will  script in the office just give him a call at [phone], thanks

## 2022-08-17 ENCOUNTER — CARE COORDINATION (OUTPATIENT)
Dept: CARE COORDINATION | Age: 82
End: 2022-08-17

## 2022-08-17 NOTE — CARE COORDINATION
ACM was removed from care team, without knowledge. Fred Sutton said that patient is in s SNF and will be there for a month or so d/t extensive hospital stay of 60 days per Fred Sutton report. ACM said once patient is d/c from SNF will restart care episode. Fred Sutton verbalized understanding.

## 2022-08-23 LAB
AFB CULTURE (MYCOBACTERIA): NORMAL
AFB CULTURE (MYCOBACTERIA): NORMAL
AFB SMEAR: NORMAL
AFB SMEAR: NORMAL

## 2022-08-23 NOTE — TELEPHONE ENCOUNTER
Spoke with pt spouse, pt was d/c to [facility]. Called facility to r/s pt f/u appt with Dr. Yehuda Caro, but nurse is on med pass. Will try back at another time.

## 2022-08-24 PROBLEM — Z99.11 VENTILATOR DEPENDENT (HCC): Status: ACTIVE | Noted: 2022-08-24

## 2022-08-26 NOTE — TELEPHONE ENCOUNTER
Called [facility], was transferred to nurse. Line rang approx 20 times then disconnected. Will try back at another time.

## 2022-08-30 LAB
AFB CULTURE (MYCOBACTERIA): NORMAL
AFB SMEAR: NORMAL

## 2022-08-31 NOTE — TELEPHONE ENCOUNTER
[facility], nurses are on med pass. Will try back at another time.

## 2022-09-15 NOTE — TELEPHONE ENCOUNTER
Received call from pt  who was calling in regards to letter he had received. Pt spouse stated pt is still in [facility] and they would need to be contacted to schedule testing and OV as pt will need transportation. I relayed to pt spouse that we have contacted them multiple times to schedule. Pt spouse stated he will let the nurses @ [facility] know they need to contact us to schedule testing as well as follow up.

## 2022-09-20 NOTE — TELEPHONE ENCOUNTER
Already advised. Those CT results are listed below. She is due for it now, keep the scheduled upcoming CT, and yes to Barbara's recommendation to f/u with Dr. Francesco Anderson after to discuss results.

## 2022-09-20 NOTE — TELEPHONE ENCOUNTER
Pt had Ct competed in hospital on 7/3/22. Please advise when pt needs to be scheduled for next CT.        ASSESSMENT:  Life threatening acute on chronic hypoxemic respiratory failure   S/P trach and PEG on 7/15/22  Aspiration pneumonia - recurrent  Mucous plugging of bronchi  Tracheobronchomalacia on bronchoscopy 7/7/2022 -  perhaps limiting airway clearance  BETSY previously on BiPAP 22/15 with 1 L bleed in   Parkinson's disease  Atrial fibrillation  Acute on chronic kidney failure, baseline creatinine 1.6  Acute on chronic anemia, s/p 2 U PRBC 6/23/2022, 1 U PRBC on 7/15/22     PLAN:  Mechanical ventilation as per my orders.   Current settings are AC 24/355/+5 @ 45%; will check ABG and adjust if alkalemic  PRN sedation  Levophed is off   L>R pleural effusion - too small to tap on bedside ultrasound   Lasix daily, change to PO   Off antibiotics, infectious disease is following   Mucomyst, Metanebs, tracheal suctioning   Prophylaxis: on BID protonix, SCD  I d/w lucia Sosa for LTAC

## 2022-09-20 NOTE — TELEPHONE ENCOUNTER
Pt's  called today to reschedule her Chest CT that was cx due to in patient admission in June. CT rescheduled to 9/28/22 (1:30 arrival for 2:00 scan) at [facility]. He will arrange transportation. Will arrange virtual f/u after test is completed.

## 2022-10-04 ENCOUNTER — HOSPITAL ENCOUNTER (OUTPATIENT)
Dept: CT IMAGING | Age: 82
Discharge: HOME | End: 2022-10-04
Payer: MEDICARE

## 2022-10-04 DIAGNOSIS — R91.1 PULMONARY NODULE: ICD-10-CM

## 2022-10-04 PROCEDURE — 71250 CT THORAX DX C-: CPT

## 2022-10-27 ENCOUNTER — SCHEDULED TELEPHONE ENCOUNTER (OUTPATIENT)
Dept: PULMONOLOGY | Age: 82
End: 2022-10-27
Payer: MEDICARE

## 2022-10-27 ENCOUNTER — HOSPITAL ENCOUNTER (EMERGENCY)
Age: 82
Discharge: HOME | End: 2022-10-27
Payer: MEDICARE

## 2022-10-27 VITALS
RESPIRATION RATE: 17 BRPM | WEIGHT: 240 LBS | DIASTOLIC BLOOD PRESSURE: 70 MMHG | OXYGEN SATURATION: 98 % | HEART RATE: 69 BPM | HEIGHT: 66 IN | TEMPERATURE: 98.3 F | SYSTOLIC BLOOD PRESSURE: 150 MMHG | BODY MASS INDEX: 38.57 KG/M2

## 2022-10-27 DIAGNOSIS — K94.20 COMPLICATION OF GASTROSTOMY TUBE (HCC): Primary | ICD-10-CM

## 2022-10-27 DIAGNOSIS — J96.10 CHRONIC RESPIRATORY FAILURE, UNSP W HYPOXIA OR HYPERCAPNIA (HCC): Primary | ICD-10-CM

## 2022-10-27 DIAGNOSIS — G47.33 OSA (OBSTRUCTIVE SLEEP APNEA): ICD-10-CM

## 2022-10-27 PROCEDURE — 1124F ACP DISCUSS-NO DSCNMKR DOCD: CPT | Performed by: INTERNAL MEDICINE

## 2022-10-27 PROCEDURE — 99213 OFFICE O/P EST LOW 20 MIN: CPT | Performed by: INTERNAL MEDICINE

## 2022-10-27 PROCEDURE — 99283 EMERGENCY DEPT VISIT LOW MDM: CPT

## 2022-10-27 ASSESSMENT — ENCOUNTER SYMPTOMS
RHINORRHEA: 0
COLOR CHANGE: 0
FACIAL SWELLING: 0
SHORTNESS OF BREATH: 0
SORE THROAT: 0
ABDOMINAL PAIN: 0

## 2022-10-27 ASSESSMENT — PAIN - FUNCTIONAL ASSESSMENT: PAIN_FUNCTIONAL_ASSESSMENT: NONE - DENIES PAIN

## 2022-10-27 NOTE — ED PROVIDER NOTES
Evaluated by [id number] [facility] Provider          [facility] ED  EMERGENCY DEPARTMENT ENCOUNTER        Pt Name: Alejandro Stern  MRN: [id number]  Mistygfchristopher 1940  Dateof evaluation: 10/27/2022  Provider: Juni Varela APRN - CNP  PCP: Rose Mary Loaiza MD  ED Attending: No att. providers found    [address] [place] [address]       Chief Complaint   Patient presents with    G Tube Complications     Pt's peg tube is clogged. Has a GI consult on 11/2/22. [facility] stating chronic issues the past 2-3 weeks. HISTORY OF PRESENTILLNESS   (Location/Symptom, Timing/Onset, Context/Setting, Quality, Duration, Modifying Factors, Severity)  Note limiting factors. Alejandro Stern is a [de-identified] y.o. female for clogged feeding tube. Onset was today. Context includes family reports that she frequently has a clogged feeding tube. Call was placed to the nursing home who states the feeding tube was clogged last night and this morning. They were unable to clog it last night but not today. Nursing home reports that she has been having more frequent clogging of her G-tube and they have changed formulas and increase the flushes but are continuing to have clogging issues. Patient has no other issues or complaints at this time. Alleviating factors include nothing. Aggravating factors include nothing. Pain is 0/10. Nothing has been used for pain today. Nursing Notes were all reviewed and agreed with or any disagreements were addressed  in the HPI. REVIEW OF SYSTEMS    (2-9 systems for level 4, 10 or more for level 5)     Review of Systems   Constitutional:  Negative for activity change, appetite change and fever. HENT:  Negative for congestion, facial swelling, rhinorrhea and sore throat. Eyes:  Negative for visual disturbance. Respiratory:  Negative for shortness of breath. Cardiovascular:  Negative for chest pain. Gastrointestinal:  Negative for abdominal pain. Clogged feeding tube   Genitourinary:  Negative for difficulty urinating. Musculoskeletal:  Negative for arthralgias and myalgias. Skin:  Negative for color change and rash. Neurological:  Negative for dizziness and light-headedness. Psychiatric/Behavioral:  Negative for agitation. All other systems reviewed and are negative. Positives and Pertinent negatives as per HPI. Except as noted above in the ROS, all other systems were reviewed and negative.        PAST MEDICAL HISTORY     Past Medical History:   Diagnosis Date    Anemia, unspecified 2010    neg bone marrow    Asthma     Atrial fibrillation ([address]     Baker's cyst     Basal cell carcinoma of left cheek 3/12/2019    Blind left eye     Carotid artery stenoses     Carotid stenosis 2002    left    Cervical spinal cord compression ([address] 11/2010    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-sided sciatica 8/9/2016    CKD (chronic kidney disease) stage 3, GFR 30-59 ml/min ([facility])     Community acquired pneumonia of left lower lobe of lung 9/8/2018    CRI     Detached retina, left     Diverticulosis     DJD (degenerative joint disease)     Edema     chronic    Fatty liver     GERD (gastroesophageal reflux disease)     Hearing loss     Herniated lumbar intervertebral disc     Hx stage 2 sacral decubitus ulcer 4/13/2018    Hyperlipidemia     Hypertension     Hypothyroid 1/27/2015    Morbid obesity with BMI of 40.0-44.9, adult ([address] 2/10/2017    BETSY treated with BiPAP     Parkinson's disease ([address]     Partial symptomatic epilepsy with complex partial seizures, not intractable, without status epilepticus ([address] 1/24/2020    Restless leg syndrome     Restless legs syndrome     Rheumatic fever 1950    2x IN [place]    Sleep apnea     Tremor, essential 8/16/2010    Type 2 diabetes mellitus without complication ([facility])     Type II or unspecified type diabetes mellitus without mention of complication, not stated as uncontrolled          SURGICAL HISTORY       Past Surgical History:   Procedure Laterality Date    APPENDECTOMY      BRONCHOSCOPY N/A 7/7/2022    BRONCHOSCOPY ALVEOLAR LAVAGE performed by Brittany Melendez MD at [address]  7/7/2022    BRONCHOSCOPY THERAPUTIC ASPIRATION INITIAL performed by Brittany Melendez MD at [address]      left    CATARACT REMOVAL      CERVICAL FUSION  11/2010    CHOLECYSTECTOMY      COLONOSCOPY N/A 3/8/2020    COLONOSCOPY FLEXIBLE W/ DECOMPRESSION performed by Kika Davis MD at [facility] (CERVIX STATUS UNKNOWN)      IR MIDLINE CATH  10/8/2021    IR MIDLINE CATH 10/8/2021 [facility] PROCEDURES    JOINT REPLACEMENT Bilateral 2003    KNEE SURGERY      replacement x 2    OVARY REMOVAL      SHOULDER SURGERY      TONSILLECTOMY      TRACHEOSTOMY N/A 7/15/2022    TRACHEOTOMY WITH PERCUTANEOUS ENDOSCOPIC GASTROSTOMY TUBE PLACEMENT performed by Bib Neff MD at [facility]  03/17/2020    Esophagitis    UPPER GASTROINTESTINAL ENDOSCOPY N/A 3/17/2020    EGD performed by Antonio Peres MD at [facility]       Previous Medications    ACETYLCYSTEINE (MUCOMYST) 20 % NEBULIZER SOLUTION    Inhale 3 mLs into the lungs 2 times daily    ALBUTEROL SULFATE  (90 BASE) MCG/ACT INHALER    INHALE 2 PUFFS INTO THE LUNGS EVERY 6 HOURS AS NEEDED FOR WHEEZING    ALPHA-LIPOIC ACID 300 MG TABS    Take by mouth daily    APIXABAN (ELIQUIS) 2.5 MG TABS TABLET    Take 1 tablet by mouth 2 times daily    ATORVASTATIN (LIPITOR) 40 MG TABLET    TAKE 1 AND 1/2 TABLETS EVERY DAY    CARBIDOPA-LEVODOPA (SINEMET)  MG PER TABLET    Take 1 tablet by mouth 4 times daily    CETIRIZINE (ZYRTEC) 10 MG TABLET    TAKE 1/2 TABLET BY MOUTH EVERY DAY    DULOXETINE (CYMBALTA) 60 MG EXTENDED RELEASE CAPSULE    TAKE 1 CAPSULE EVERY DAY    FERROUS SULFATE (IRON 325) 325 (65 FE) MG TABLET    Take 325 mg by mouth 2 times daily     FLUTICASONE (FLONASE) 50 MCG/ACT NASAL SPRAY    USE 2 PUFFS IN EACH NOSTRIL DAILY    FLUTICASONE-VILANTEROL (BREO ELLIPTA) 100-25 MCG/INH AEPB INHALER    INHALE 1 PUFF EVERY DAY    FUROSEMIDE (LASIX) 40 MG TABLET    Take 1 tablet by mouth in the morning. HANDICAP PLACARD MISC    by Does not apply route DX: G20  parkinson's disease  Exp: 9/1/2024    IPRATROPIUM-ALBUTEROL (DUONEB) 0.5-2.5 (3) MG/3ML SOLN NEBULIZER SOLUTION    Inhale 3 mLs into the lungs every 6 hours as needed for Shortness of Breath DX: J45.40    LAMOTRIGINE (LAMICTAL) 25 MG TABLET    Take 50 mg by mouth 2 times daily     LIDOCAINE (XYLOCAINE) 5 % OINTMENT    Apply topically every 3 hours as needed for Pain Apply topically as needed. LIFT CHAIR MISC    by Does not apply route    METOPROLOL TARTRATE (LOPRESSOR) 50 MG TABLET    Take 0.5 tablets by mouth in the morning and 0.5 tablets before bedtime. MONTELUKAST (SINGULAIR) 10 MG TABLET    TAKE 1 TABLET EVERY DAY    MULTIPLE VITAMINS-MINERALS (THERAPEUTIC MULTIVITAMIN-MINERALS) TABLET    Take 1 tablet by mouth daily    PANTOPRAZOLE (PROTONIX) 40 MG TABLET    Take 1 tablet by mouth in the morning and 1 tablet in the evening. Take before meals.     PROPAFENONE (RYTHMOL) 150 MG TABLET    TAKE 1 TABLET BY MOUTH EVERY 8 HOURS         ALLERGIES     Levaquin [levofloxacin], Metoclopramide, Phenazopyridine, Pyridium [phenazopyridine hcl], Reglan [metoclopramide hcl], and Reglan [metoclopramide hcl]    FAMILY HISTORY       Family History   Problem Relation Age of Onset    Diabetes Mother     Heart Disease Mother     Diabetes Father     Heart Disease Father     Diabetes Sister           SOCIAL HISTORY       Social History     Socioeconomic History    Marital status:      Spouse name: Anny    Number of children: 2    Years of education: 12    Highest education level: None   Tobacco Use Smoking status: Former    Smokeless tobacco: Never   Vaping Use    Vaping Use: Never used   Substance and Sexual Activity    Alcohol use: No    Drug use: No    Sexual activity: Not Currently   Social History Narrative    ** Merged History Encounter **          Social Determinants of Health     Housing Stability: Low Risk     Unable to Pay for Housing in the Last Year: No    Number of Places Lived in the Last Year: 1    Unstable Housing in the Last Year: No       SCREENINGS    [place] Coma Scale  Eye Opening: Spontaneous  Best Verbal Response: Oriented  Best Motor Response: Obeys commands  [place] Coma Scale Score: 15        PHYSICAL EXAM  (up to 7 for level 4, 8 or more for level 5)     ED Triage Vitals [10/27/22 1431]   BP Temp Temp Source Heart Rate Resp SpO2 Height Weight   (!) 156/68 98.3 °F (36.8 °C) Oral 71 18 98 % 5' 6\" (1.676 m) 240 lb (108.9 kg)       Physical Exam  Constitutional:       Appearance: Normal appearance. She is well-developed. HENT:      Head: Normocephalic and atraumatic. Cardiovascular:      Rate and Rhythm: Normal rate. Pulmonary:      Effort: Pulmonary effort is normal. No respiratory distress. Abdominal:      General: There is no distension. Palpations: Abdomen is soft. Tenderness: There is no abdominal tenderness. Comments: Feeding tube intact to abdomen. Musculoskeletal:         General: Normal range of motion. Cervical back: Normal range of motion. Skin:     General: Skin is warm and dry. Neurological:      Mental Status: She is alert and oriented to person, place, and time. DIAGNOSTIC RESULTS   LABS:    Labs Reviewed - No data to display        EKG: All EKG's are interpreted by the Emergency Department Physician who either signs or Co-signs this chart in the absence of a cardiologist.  Please see their note for interpretation of EKG.       RADIOLOGY:           Interpretation per the Radiologist below, if available at the time of this note:    No orders to display     No results found. PROCEDURES   Unless otherwise noted below, none     Procedures           EMERGENCYDEPARTMENT COURSE and DIFFERENTIAL DIAGNOSIS/MDM:   Vitals:    Vitals:    10/27/22 1431   BP: (!) 156/68   Pulse: 71   Resp: 18   Temp: 98.3 °F (36.8 °C)   TempSrc: Oral   SpO2: 98%   Weight: 240 lb (108.9 kg)   Height: 5' 6\" (1.676 m)       Patient was given the following medications:  Medications - No data to display    Patient was seen and evaluated by myself. Patient here for concerns for clogged feeding tube. Family reports that they have had increased clogging to this feeding tube in the last few days. They report that the nursing home was able to typically unclog it but today was not. I did call and talk with the nursing home staff who states that the feeding tube is clogged last night and this morning. They were unable to clogged last night but were unable to unclog it today. They do report that they have changed formulas and increased flushes however continued to have problems with this clogging frequently. I did inform the nursing home that the feeding tube has been unclogged while she was here and at this point she will be discharged back to the nursing home. They were given recommendations to follow-up with GI or general surgery to have the tube exchanged. Nursing home reports that she does have a GI appointment coming up in a few days. They were encouraged to return to the ED for any worsening symptoms and to follow-up with the primary care doctor as well. Patient was ultimately discharged with all questions answered. Is this patient to be included in the SEP-1 Core Measure due to severe sepsis or septic shock? No   Exclusion criteria - the patient is NOT to be included for SEP-1 Core Measure due to: Infection is not suspected       Patient was seen during the time of the [place] pandemic. N95 and appropriate PPE was worn during the visit.       The patient tolerated their visit well. I have evaluated this patient. My supervising physician was available for consultation. The patient and / or the family were informed of the results of any tests, a time was given to answer questions, a plan was proposed and they agreed with plan. FINAL IMPRESSION      1.  Complication of gastrostomy tube [facility])          DISPOSITION/PLAN   DISPOSITION Decision To Discharge 10/27/2022 03:25:16 PM      PATIENT REFERRED TO:  Emile Ahumada, MD  [facility] [address] [facility] [address]  [phone]    Schedule an appointment as soon as possible for a visit in 2 days  for re-evaluation    [address] [facility]-M ED  [address] [facility]  [phone]    If symptoms worsen    Fatmata Watson MD  [address] [place] [address] [phone]    Schedule an appointment as soon as possible for a visit in 2 days  for re-evaluation and possible g tube change    Satya Paredes MD  [address], [address] [place] [place] [address] [phone]    Schedule an appointment as soon as possible for a visit in 2 days  for re-evaluation possible g tube change      DISCHARGE MEDICATIONS:  New Prescriptions    No medications on file       DISCONTINUED MEDICATIONS:  Discontinued Medications    No medications on file              (Please note that portions of this note were completed with a voice recognition program.  Efforts were made to edit the dictations but occasionally words are mis-transcribed.)    MAHAD Gillis CNP (electronically signed)         MAHAD Gillis CNP  10/27/22 4009

## 2022-10-27 NOTE — ED NOTES
Tube unclogged with  brush then flushed with water. Tube flushing with no problems.      Marlin Ham RN  10/27/22 1485

## 2022-10-27 NOTE — ED TRIAGE NOTES
Chief Complaint   Patient presents with    G Tube Complications     Pt's peg tube is clogged. Has a GI consult on 11/2/22. [facility] stating chronic issues the past 2-3 weeks.

## 2022-10-27 NOTE — PROGRESS NOTES
Pulmonary Follow-up Note  Chief Complaint: abnormal CT CHEST    Interval history: was admitted to hospital, had trach removed and was unable to use BiPAP, now back to using BiPAP; still has PEG. Patient unable to speak on phone this morning, I spoke with her spouse who was with her in the nursing home. Presenting history: had fall resulting in rib fractures, CT performed in ED     reports that she has quit smoking. She has never used smokeless tobacco.     Review of Systems:    Physical Exam:  not currently breastfeeding. Phone    Data:   CT CHEST 10/4/22  Impression   1. Near complete resolution of airspace changes in the bilateral lungs from   prior CT. Resolving aspiration or pneumonitis suspected. 2. No discrete pulmonary mass or nodule. 3. Mild amount of secretions within the right mainstem bronchus. No   peripheral mucoid impaction. Assessment:  Chronic Respiratory Failure s/p trach and PEG 7/15/22 - now decannulated  Pulmonary Nodules have resolved  H/O aspiration pneumonia   Tracheobronchomalacia   BETSY f/b outside provider  Right lateral inferior chest wall pain 2/2 mechanical fall Feb 2020  Parkinson Disease  Asthma f/b PCP   PND  Paroxysmal atrial fibrillation on Eliquis   Diabetes mellitus     Plan:    No additional imaging is required. Routine f/u with nursing home provider and then with her usual sleep medicine provider.   F/U with me will be just PRN

## 2022-10-28 ENCOUNTER — TELEPHONE (OUTPATIENT)
Dept: FAMILY MEDICINE CLINIC | Age: 82
End: 2022-10-28

## 2022-10-28 NOTE — TELEPHONE ENCOUNTER
Left message for pt to schedule nurse AWV- This can be done  in office or via telephone-( please schedule as a VVAWV appt type if telephone.)  Due after 11/30/22

## 2022-11-07 ENCOUNTER — HOSPITAL ENCOUNTER (OUTPATIENT)
Age: 82
Setting detail: OBSERVATION
Discharge: NURSING, REHAB OR LONG-TERM CARE | End: 2022-11-08
Attending: STUDENT IN AN ORGANIZED HEALTH CARE EDUCATION/TRAINING PROGRAM | Admitting: INTERNAL MEDICINE
Payer: MEDICARE

## 2022-11-07 ENCOUNTER — TELEPHONE (OUTPATIENT)
Dept: GASTROENTEROLOGY | Age: 82
End: 2022-11-07

## 2022-11-07 ENCOUNTER — APPOINTMENT (OUTPATIENT)
Dept: GENERAL RADIOLOGY | Age: 82
End: 2022-11-07
Payer: MEDICARE

## 2022-11-07 ENCOUNTER — APPOINTMENT (OUTPATIENT)
Dept: CT IMAGING | Age: 82
End: 2022-11-07
Payer: MEDICARE

## 2022-11-07 ENCOUNTER — ANESTHESIA EVENT (OUTPATIENT)
Dept: ENDOSCOPY | Age: 82
End: 2022-11-07
Payer: MEDICARE

## 2022-11-07 DIAGNOSIS — N18.9 CHRONIC KIDNEY DISEASE, UNSPECIFIED CKD STAGE: ICD-10-CM

## 2022-11-07 DIAGNOSIS — K94.23 PEG TUBE MALFUNCTION (HCC): Primary | ICD-10-CM

## 2022-11-07 PROBLEM — T85.528A GASTROJEJUNOSTOMY TUBE DISLODGEMENT: Status: ACTIVE | Noted: 2022-01-01

## 2022-11-07 LAB
A/G RATIO: 0.9 (ref 1.1–2.2)
ALBUMIN SERPL-MCNC: 3.7 G/DL (ref 3.4–5)
ALP BLD-CCNC: 134 U/L (ref 40–129)
ALT SERPL-CCNC: 6 U/L (ref 10–40)
ANION GAP SERPL CALCULATED.3IONS-SCNC: 9 MMOL/L (ref 3–16)
AST SERPL-CCNC: 14 U/L (ref 15–37)
BACTERIA: ABNORMAL /HPF
BASOPHILS ABSOLUTE: 0.1 K/UL (ref 0–0.2)
BASOPHILS RELATIVE PERCENT: 0.6 %
BILIRUB SERPL-MCNC: 0.3 MG/DL (ref 0–1)
BILIRUBIN URINE: NEGATIVE
BLOOD, URINE: NEGATIVE
BUN BLDV-MCNC: 25 MG/DL (ref 7–20)
CALCIUM SERPL-MCNC: 9.8 MG/DL (ref 8.3–10.6)
CHLORIDE BLD-SCNC: 100 MMOL/L (ref 99–110)
CLARITY: CLEAR
CO2: 29 MMOL/L (ref 21–32)
COLOR: YELLOW
CREAT SERPL-MCNC: 1.3 MG/DL (ref 0.6–1.2)
EKG ATRIAL RATE: 73 BPM
EKG DIAGNOSIS: NORMAL
EKG P AXIS: 86 DEGREES
EKG P-R INTERVAL: 142 MS
EKG Q-T INTERVAL: 394 MS
EKG QRS DURATION: 90 MS
EKG QTC CALCULATION (BAZETT): 434 MS
EKG R AXIS: 60 DEGREES
EKG T AXIS: 72 DEGREES
EKG VENTRICULAR RATE: 73 BPM
EOSINOPHILS ABSOLUTE: 0.4 K/UL (ref 0–0.6)
EOSINOPHILS RELATIVE PERCENT: 3.1 %
GFR SERPL CREATININE-BSD FRML MDRD: 41 ML/MIN/{1.73_M2}
GLUCOSE BLD-MCNC: 108 MG/DL (ref 70–99)
GLUCOSE BLD-MCNC: 113 MG/DL (ref 70–99)
GLUCOSE URINE: NEGATIVE MG/DL
HCT VFR BLD CALC: 30.3 % (ref 36–48)
HEMOGLOBIN: 9.8 G/DL (ref 12–16)
INFLUENZA A: NOT DETECTED
INFLUENZA B: NOT DETECTED
KETONES, URINE: NEGATIVE MG/DL
LACTIC ACID: 0.7 MMOL/L (ref 0.4–2)
LEUKOCYTE ESTERASE, URINE: NEGATIVE
LIPASE: 32 U/L (ref 13–60)
LYMPHOCYTES ABSOLUTE: 1.4 K/UL (ref 1–5.1)
LYMPHOCYTES RELATIVE PERCENT: 11.9 %
MCH RBC QN AUTO: 28.3 PG (ref 26–34)
MCHC RBC AUTO-ENTMCNC: 32.2 G/DL (ref 31–36)
MCV RBC AUTO: 87.6 FL (ref 80–100)
MICROSCOPIC EXAMINATION: YES
MONOCYTES ABSOLUTE: 0.7 K/UL (ref 0–1.3)
MONOCYTES RELATIVE PERCENT: 6 %
MUCUS: ABNORMAL /LPF
NEUTROPHILS ABSOLUTE: 9 K/UL (ref 1.7–7.7)
NEUTROPHILS RELATIVE PERCENT: 78.4 %
NITRITE, URINE: NEGATIVE
PDW BLD-RTO: 13.3 % (ref 12.4–15.4)
PERFORMED ON: ABNORMAL
PH UA: 8 (ref 5–8)
PLATELET # BLD: 272 K/UL (ref 135–450)
PMV BLD AUTO: 9.3 FL (ref 5–10.5)
POTASSIUM REFLEX MAGNESIUM: 5.2 MMOL/L (ref 3.5–5.1)
PROTEIN UA: 30 MG/DL
RBC # BLD: 3.45 M/UL (ref 4–5.2)
RBC UA: ABNORMAL /HPF (ref 0–4)
SARS-COV-2 RNA, RT PCR: NOT DETECTED
SODIUM BLD-SCNC: 138 MMOL/L (ref 136–145)
SPECIFIC GRAVITY UA: 1.01 (ref 1–1.03)
TOTAL PROTEIN: 7.6 G/DL (ref 6.4–8.2)
TROPONIN: 0.02 NG/ML
TROPONIN: <0.01 NG/ML
URINE REFLEX TO CULTURE: ABNORMAL
URINE TYPE: ABNORMAL
UROBILINOGEN, URINE: 0.2 E.U./DL
WBC # BLD: 11.5 K/UL (ref 4–11)
WBC UA: ABNORMAL /HPF (ref 0–5)

## 2022-11-07 PROCEDURE — 36415 COLL VENOUS BLD VENIPUNCTURE: CPT

## 2022-11-07 PROCEDURE — 81001 URINALYSIS AUTO W/SCOPE: CPT

## 2022-11-07 PROCEDURE — 83605 ASSAY OF LACTIC ACID: CPT

## 2022-11-07 PROCEDURE — 74176 CT ABD & PELVIS W/O CONTRAST: CPT

## 2022-11-07 PROCEDURE — 99285 EMERGENCY DEPT VISIT HI MDM: CPT

## 2022-11-07 PROCEDURE — 85025 COMPLETE CBC W/AUTO DIFF WBC: CPT

## 2022-11-07 PROCEDURE — G0378 HOSPITAL OBSERVATION PER HR: HCPCS

## 2022-11-07 PROCEDURE — 74018 RADEX ABDOMEN 1 VIEW: CPT

## 2022-11-07 PROCEDURE — 6360000002 HC RX W HCPCS: Performed by: INTERNAL MEDICINE

## 2022-11-07 PROCEDURE — 2580000003 HC RX 258: Performed by: INTERNAL MEDICINE

## 2022-11-07 PROCEDURE — 83690 ASSAY OF LIPASE: CPT

## 2022-11-07 PROCEDURE — 84484 ASSAY OF TROPONIN QUANT: CPT

## 2022-11-07 PROCEDURE — 93005 ELECTROCARDIOGRAM TRACING: CPT | Performed by: PHYSICIAN ASSISTANT

## 2022-11-07 PROCEDURE — 87636 SARSCOV2 & INF A&B AMP PRB: CPT

## 2022-11-07 PROCEDURE — 1200000000 HC SEMI PRIVATE

## 2022-11-07 PROCEDURE — 2580000003 HC RX 258: Performed by: PHYSICIAN ASSISTANT

## 2022-11-07 PROCEDURE — 94640 AIRWAY INHALATION TREATMENT: CPT

## 2022-11-07 PROCEDURE — 80053 COMPREHEN METABOLIC PANEL: CPT

## 2022-11-07 PROCEDURE — 6360000004 HC RX CONTRAST MEDICATION: Performed by: PHYSICIAN ASSISTANT

## 2022-11-07 RX ORDER — AMLODIPINE BESYLATE 5 MG/1
5 TABLET ORAL DAILY
COMMUNITY

## 2022-11-07 RX ORDER — AMLODIPINE BESYLATE 5 MG/1
5 TABLET ORAL DAILY
Status: DISCONTINUED | OUTPATIENT
Start: 2022-11-08 | End: 2022-11-08 | Stop reason: HOSPADM

## 2022-11-07 RX ORDER — BUDESONIDE 0.5 MG/2ML
250 INHALANT ORAL 2 TIMES DAILY
Status: DISCONTINUED | OUTPATIENT
Start: 2022-11-07 | End: 2022-11-08 | Stop reason: HOSPADM

## 2022-11-07 RX ORDER — SCOLOPAMINE TRANSDERMAL SYSTEM 1 MG/1
1 PATCH, EXTENDED RELEASE TRANSDERMAL
Status: DISCONTINUED | OUTPATIENT
Start: 2022-11-08 | End: 2022-11-08 | Stop reason: HOSPADM

## 2022-11-07 RX ORDER — SENNA AND DOCUSATE SODIUM 50; 8.6 MG/1; MG/1
1 TABLET, FILM COATED ORAL 2 TIMES DAILY
Status: DISCONTINUED | OUTPATIENT
Start: 2022-11-07 | End: 2022-11-08 | Stop reason: HOSPADM

## 2022-11-07 RX ORDER — SCOLOPAMINE TRANSDERMAL SYSTEM 1 MG/1
1 PATCH, EXTENDED RELEASE TRANSDERMAL
COMMUNITY

## 2022-11-07 RX ORDER — FORMOTEROL FUMARATE 20 UG/2ML
20 SOLUTION RESPIRATORY (INHALATION) EVERY 12 HOURS SCHEDULED
COMMUNITY

## 2022-11-07 RX ORDER — ATORVASTATIN CALCIUM 10 MG/1
10 TABLET, FILM COATED ORAL DAILY
Status: DISCONTINUED | OUTPATIENT
Start: 2022-11-08 | End: 2022-11-08 | Stop reason: HOSPADM

## 2022-11-07 RX ORDER — CARVEDILOL 12.5 MG/1
12.5 TABLET ORAL 2 TIMES DAILY WITH MEALS
COMMUNITY

## 2022-11-07 RX ORDER — M-VIT,TX,IRON,MINS/CALC/FOLIC 27MG-0.4MG
1 TABLET ORAL DAILY
Status: DISCONTINUED | OUTPATIENT
Start: 2022-11-08 | End: 2022-11-08 | Stop reason: HOSPADM

## 2022-11-07 RX ORDER — CARVEDILOL 6.25 MG/1
12.5 TABLET ORAL 2 TIMES DAILY WITH MEALS
Status: DISCONTINUED | OUTPATIENT
Start: 2022-11-08 | End: 2022-11-08 | Stop reason: HOSPADM

## 2022-11-07 RX ORDER — LAMOTRIGINE 25 MG/1
50 TABLET ORAL 2 TIMES DAILY
Status: DISCONTINUED | OUTPATIENT
Start: 2022-11-07 | End: 2022-11-08 | Stop reason: HOSPADM

## 2022-11-07 RX ORDER — AMOXICILLIN 250 MG
1 CAPSULE ORAL 2 TIMES DAILY
COMMUNITY

## 2022-11-07 RX ORDER — ESCITALOPRAM OXALATE 10 MG/1
10 TABLET ORAL DAILY
Status: DISCONTINUED | OUTPATIENT
Start: 2022-11-08 | End: 2022-11-08 | Stop reason: HOSPADM

## 2022-11-07 RX ORDER — ESCITALOPRAM OXALATE 10 MG/1
10 TABLET ORAL DAILY
COMMUNITY

## 2022-11-07 RX ORDER — CLONIDINE HYDROCHLORIDE 0.1 MG/1
0.1 TABLET ORAL 2 TIMES DAILY
COMMUNITY

## 2022-11-07 RX ORDER — ACETAMINOPHEN 650 MG/1
650 SUPPOSITORY RECTAL EVERY 6 HOURS PRN
Status: DISCONTINUED | OUTPATIENT
Start: 2022-11-07 | End: 2022-11-08 | Stop reason: HOSPADM

## 2022-11-07 RX ORDER — FUROSEMIDE 40 MG/1
40 TABLET ORAL DAILY
Status: DISCONTINUED | OUTPATIENT
Start: 2022-11-08 | End: 2022-11-08 | Stop reason: HOSPADM

## 2022-11-07 RX ORDER — SODIUM CHLORIDE 9 MG/ML
INJECTION, SOLUTION INTRAVENOUS CONTINUOUS
Status: DISCONTINUED | OUTPATIENT
Start: 2022-11-07 | End: 2022-11-08 | Stop reason: HOSPADM

## 2022-11-07 RX ORDER — FERROUS SULFATE 325(65) MG
325 TABLET ORAL
Status: DISCONTINUED | OUTPATIENT
Start: 2022-11-08 | End: 2022-11-08 | Stop reason: HOSPADM

## 2022-11-07 RX ORDER — FERROUS SULFATE 325(65) MG
325 TABLET ORAL
COMMUNITY

## 2022-11-07 RX ORDER — ACETAMINOPHEN 325 MG/1
650 TABLET ORAL EVERY 6 HOURS PRN
COMMUNITY

## 2022-11-07 RX ORDER — ONDANSETRON 2 MG/ML
4 INJECTION INTRAMUSCULAR; INTRAVENOUS EVERY 6 HOURS PRN
Status: DISCONTINUED | OUTPATIENT
Start: 2022-11-07 | End: 2022-11-08 | Stop reason: HOSPADM

## 2022-11-07 RX ORDER — CLONIDINE HYDROCHLORIDE 0.1 MG/1
0.1 TABLET ORAL 2 TIMES DAILY
Status: DISCONTINUED | OUTPATIENT
Start: 2022-11-07 | End: 2022-11-08 | Stop reason: HOSPADM

## 2022-11-07 RX ORDER — SIMETHICONE 80 MG
40 TABLET,CHEWABLE ORAL 3 TIMES DAILY
Status: DISCONTINUED | OUTPATIENT
Start: 2022-11-07 | End: 2022-11-08 | Stop reason: HOSPADM

## 2022-11-07 RX ORDER — 0.9 % SODIUM CHLORIDE 0.9 %
1000 INTRAVENOUS SOLUTION INTRAVENOUS ONCE
Status: COMPLETED | OUTPATIENT
Start: 2022-11-07 | End: 2022-11-07

## 2022-11-07 RX ORDER — ONDANSETRON 4 MG/1
4 TABLET, ORALLY DISINTEGRATING ORAL EVERY 8 HOURS PRN
Status: DISCONTINUED | OUTPATIENT
Start: 2022-11-07 | End: 2022-11-08 | Stop reason: HOSPADM

## 2022-11-07 RX ORDER — ACETYLCYSTEINE 200 MG/ML
600 SOLUTION ORAL; RESPIRATORY (INHALATION) 2 TIMES DAILY
Status: DISCONTINUED | OUTPATIENT
Start: 2022-11-07 | End: 2022-11-08 | Stop reason: HOSPADM

## 2022-11-07 RX ORDER — SODIUM CHLORIDE 0.9 % (FLUSH) 0.9 %
5-40 SYRINGE (ML) INJECTION EVERY 12 HOURS SCHEDULED
Status: DISCONTINUED | OUTPATIENT
Start: 2022-11-07 | End: 2022-11-08 | Stop reason: HOSPADM

## 2022-11-07 RX ORDER — BUDESONIDE 0.25 MG/2ML
1 INHALANT ORAL 2 TIMES DAILY
COMMUNITY

## 2022-11-07 RX ORDER — MONTELUKAST SODIUM 10 MG/1
10 TABLET ORAL NIGHTLY
Status: DISCONTINUED | OUTPATIENT
Start: 2022-11-07 | End: 2022-11-08 | Stop reason: HOSPADM

## 2022-11-07 RX ORDER — POLYETHYLENE GLYCOL 3350 17 G/17G
17 POWDER, FOR SOLUTION ORAL DAILY PRN
Status: DISCONTINUED | OUTPATIENT
Start: 2022-11-07 | End: 2022-11-08 | Stop reason: HOSPADM

## 2022-11-07 RX ORDER — SODIUM CHLORIDE 0.9 % (FLUSH) 0.9 %
5-40 SYRINGE (ML) INJECTION PRN
Status: DISCONTINUED | OUTPATIENT
Start: 2022-11-07 | End: 2022-11-08 | Stop reason: HOSPADM

## 2022-11-07 RX ORDER — SODIUM CHLORIDE 9 MG/ML
INJECTION, SOLUTION INTRAVENOUS PRN
Status: DISCONTINUED | OUTPATIENT
Start: 2022-11-07 | End: 2022-11-08 | Stop reason: HOSPADM

## 2022-11-07 RX ORDER — ACETAMINOPHEN 325 MG/1
650 TABLET ORAL EVERY 6 HOURS PRN
Status: DISCONTINUED | OUTPATIENT
Start: 2022-11-07 | End: 2022-11-08 | Stop reason: HOSPADM

## 2022-11-07 RX ORDER — PROPAFENONE HYDROCHLORIDE 150 MG/1
150 TABLET, FILM COATED ORAL EVERY 8 HOURS SCHEDULED
Status: DISCONTINUED | OUTPATIENT
Start: 2022-11-07 | End: 2022-11-08 | Stop reason: HOSPADM

## 2022-11-07 RX ADMIN — SODIUM CHLORIDE 1000 ML: 9 INJECTION, SOLUTION INTRAVENOUS at 16:48

## 2022-11-07 RX ADMIN — IOHEXOL 50 ML: 240 INJECTION, SOLUTION INTRATHECAL; INTRAVASCULAR; INTRAVENOUS; ORAL at 14:52

## 2022-11-07 RX ADMIN — SODIUM CHLORIDE: 9 INJECTION, SOLUTION INTRAVENOUS at 22:06

## 2022-11-07 RX ADMIN — BUDESONIDE 250 MCG: 0.5 SUSPENSION RESPIRATORY (INHALATION) at 23:44

## 2022-11-07 RX ADMIN — Medication 10 ML: at 22:06

## 2022-11-07 ASSESSMENT — PAIN SCALES - GENERAL: PAINLEVEL_OUTOF10: 0

## 2022-11-07 ASSESSMENT — PAIN - FUNCTIONAL ASSESSMENT: PAIN_FUNCTIONAL_ASSESSMENT: 0-10

## 2022-11-07 NOTE — ED NOTES
Writer called lab to request lab draw for troponin recollect after multiple unsuccessful attempts for collection       Porsha Santos RN  11/07/22 4537

## 2022-11-07 NOTE — PLAN OF CARE
Admit to observation to med surgery for displaced and clogged G tube.   Plans to replace in esther Ott MD 11/7/2022 6:36 PM

## 2022-11-07 NOTE — TELEPHONE ENCOUNTER
Selin from [facility] called to inform patient has clogged PEG tube and requires replacement. Patient is currently at [facility] ED. Plan is to perform endoscopic replacement of PEG.  Discussed with ED and [facility] GI team.

## 2022-11-07 NOTE — ED PROVIDER NOTES
I independently performed a history and physical on Amanda Wang. All diagnostic, treatment, and disposition decisions were made by myself in conjunction with the advanced practice provider/resident. Labs Reviewed   CBC WITH AUTO DIFFERENTIAL - Abnormal; Notable for the following components:       Result Value    WBC 11.5 (*)     RBC 3.45 (*)     Hemoglobin 9.8 (*)     Hematocrit 30.3 (*)     Neutrophils Absolute 9.0 (*)     All other components within normal limits   COMPREHENSIVE METABOLIC PANEL W/ REFLEX TO MG FOR LOW K - Abnormal; Notable for the following components:    Potassium reflex Magnesium 5.2 (*)     Glucose 113 (*)     BUN 25 (*)     Creatinine 1.3 (*)     Est, Glom Filt Rate 41 (*)     Albumin/Globulin Ratio 0.9 (*)     Alkaline Phosphatase 134 (*)     ALT 6 (*)     AST 14 (*)     All other components within normal limits   URINALYSIS WITH REFLEX TO CULTURE - Abnormal; Notable for the following components:    Protein, UA 30 (*)     All other components within normal limits   TROPONIN - Abnormal; Notable for the following components:    Troponin 0.02 (*)     All other components within normal limits   MICROSCOPIC URINALYSIS - Abnormal; Notable for the following components:    Mucus, UA Rare (*)     Bacteria, UA 1+ (*)     All other components within normal limits   COVID-19 & INFLUENZA COMBO   LACTIC ACID   LIPASE   TROPONIN     CT ABDOMEN PELVIS WO CONTRAST Additional Contrast? None   Final Result   1. No acute findings in the abdomen or pelvis. 2. Percutaneous gastrostomy tube with the balloon at the gastric antrum. 3. New airspace opacity at the medial aspect of the right lower lobe that   could reflect atelectasis or pneumonia. XR ABDOMEN (KUB) (SINGLE AP VIEW)   Final Result   Normally located percutaneous gastrostomy tube. No evidence of bowel obstruction.            For further details of [facility] emergency department encounter, please see the TARIQ/resident's documentation. I personally saw the patient and performed a substantive portion of the visit including all aspects of the medical decision making. Briefly, this is a [de-identified] female presenting from nursing home with concerns for issues with her PEG tube. She is complaining of pain and distention with feeds at her nursing home. Labs are performed, did reveal mild bump in troponin compared to baseline. She has no chest pain or shortness of breath. GI was consulted and patient will be hospitalized for plans to replace her G-tube. Patient and family members are comfortable and in agreement with plan of care. I personally saw the patient and independently provided 0 minutes of non-concurrent critical care out of the total shared critical care time provided. I, Dr. Josr Luna, am the primary clinician of record. 1. PEG tube malfunction ([facility]      Comment: Please note this report has been produced using speech recognition software and may contain errors related to that system including errors in grammar, punctuation, and spelling, as well as words and phrases that may be inappropriate. If there are any questions or concerns please feel free to contact the dictating provider for clarification. The Ekg interpreted by me shows  normal sinus rhythm with a rate of 73  Axis is   Normal  QTc is  normal  Intervals and Durations are unremarkable.       ST Segments: normal  PACs have resolved, otherwise similar compared to previous performed 6/17/2020 2012         Faustina Fulton MD  11/07/22 [address] Josr Luna MD  11/07/22 1101

## 2022-11-07 NOTE — ED PROVIDER NOTES
[address] ED  EMERGENCY DEPARTMENT ENCOUNTER        Pt Name: Roopa Monge  MRN: [id number]  Armstrongfchristopher 1940  Date of evaluation: 11/7/2022  Provider: BIJAN Colbert  PCP: Denae Prince MD    This patient was seen and evaluated by the attending physician Avani Landis       Chief Complaint   Patient presents with    Abdominal Pain     Pt comes from nursing home due to issue with PEG tube. Pt complaining of pain and distension. Nursing home reports problems with clogging and talked about replacing it. GI wanted to send Pt to ER       HISTORY OF PRESENT ILLNESS   (Location/Symptom, Timing/Onset, Context/Setting, Quality, Duration, Modifying Factors, Severity)  Note limiting factors. Roopa Monge is a [de-identified] y.o. female with past medical history of GERD, hypertension, hyperlipidemia, coronary artery disease, obstructive sleep apnea, hypothyroidism, left carotid artery stenosis status post CEA, type 2 diabetes, paroxysmal atrial fibrillation, chronic kidney disease, history of C. difficile colitis, mitral valve insufficiency, fatty liver disease, anticoagulated on Eliquis who presents via EMS from her skilled nursing facility for evaluation of abdominal pain and clogged G-tube. Patient notes that her G-tube became clogged this morning. She also notes that after every tube feed for the last 24 hours she has been having abdominal distention and pain. She denies any nausea vomiting, denies any melena hematochezia no diarrhea. Denies any fevers. No urinary symptoms. She denies any chest pain cough shortness of breath no headache no sore throat. She follows with Dr. Nessa Sequeira. Nursing Notes were all reviewed and agreed with or any disagreements were addressed  in the HPI. Pt was seen during the [place] 19 pandemic. Appropriate PPE worn by ME during patient encounters.  Pt seen during a time with constrained hospital bed capacity and other potential inpatient and outpatient resources were constrained due to the viral pandemic. REVIEW OF SYSTEMS    (2-9 systems for level 4, 10 or more for level 5)     Review of Systems    Positives and Pertinent negatives as per HPI. Except as noted abovein the ROS, all other systems were reviewed and negative.        PAST MEDICAL HISTORY     Past Medical History:   Diagnosis Date    Anemia, unspecified 2010    neg bone marrow    Asthma     Atrial fibrillation ([address]     Baker's cyst     Basal cell carcinoma of left cheek 3/12/2019    Blind left eye     Carotid artery stenoses     Carotid stenosis 2002    left    Cervical spinal cord compression ([address] 11/2010    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-sided sciatica 8/9/2016    CKD (chronic kidney disease) stage 3, GFR 30-59 ml/min ([facility])     Community acquired pneumonia of left lower lobe of lung 9/8/2018    CRI     Detached retina, left     Diverticulosis     DJD (degenerative joint disease)     Edema     chronic    Fatty liver     GERD (gastroesophageal reflux disease)     Hearing loss     Herniated lumbar intervertebral disc     Hx stage 2 sacral decubitus ulcer 4/13/2018    Hyperlipidemia     Hypertension     Hypothyroid 1/27/2015    Morbid obesity with BMI of 40.0-44.9, adult ([address] 2/10/2017    BETSY treated with BiPAP     Parkinson's disease ([address]     Partial symptomatic epilepsy with complex partial seizures, not intractable, without status epilepticus ([address] 1/24/2020    Restless leg syndrome     Restless legs syndrome     Rheumatic fever 1950    2x IN [facility]    Sleep apnea     Tremor, essential 8/16/2010    Type 2 diabetes mellitus without complication (HCC)     Type II or unspecified type diabetes mellitus without mention of complication, not stated as uncontrolled          SURGICAL HISTORY     Past Surgical History:   Procedure Laterality Date    APPENDECTOMY      BRONCHOSCOPY N/A 7/7/2022    BRONCHOSCOPY ALVEOLAR LAVAGE performed by Braydon Gonzalez MD at [address] [facility] ENDOSCOPY    BRONCHOSCOPY  7/7/2022    BRONCHOSCOPY THERAPUTIC ASPIRATION INITIAL performed by Lana Patrick MD at [address] [facility] [address]      left    CATARACT REMOVAL      CERVICAL FUSION  11/2010    CHOLECYSTECTOMY      COLONOSCOPY N/A 3/8/2020    COLONOSCOPY FLEXIBLE W/ DECOMPRESSION performed by Yaron Hagan MD at [address] [facility] (CERVIX STATUS UNKNOWN)      IR MIDLINE CATH  10/8/2021    IR MIDLINE CATH 10/8/2021 [facility] PROC[facility]    JOINT REPLACEMENT Bilateral 2003    KNEE SURGERY      replacement x 2    OVARY REMOVAL      SHOULDER SURGERY      TONSILLECTOMY      TRACHEOSTOMY N/A 7/15/2022    TRACHEOTOMY WITH PERCUTANEOUS ENDOSCOPIC GASTROSTOMY TUBE PLACEMENT performed by Elodia Laguna MD at [address]   03/17/2020    Esophagitis    UPPER GASTROINTESTINAL ENDOSCOPY N/A 3/17/2020    EGD performed by Tato Guzman MD at [facility]       Previous Medications    ACETAMINOPHEN (TYLENOL) 325 MG TABLET    Take 650 mg by mouth every 6 hours as needed for Pain    ACETYLCYSTEINE (MUCOMYST) 20 % NEBULIZER SOLUTION    Inhale 3 mLs into the lungs 2 times daily    AMLODIPINE (NORVASC) 5 MG TABLET    Take 5 mg by mouth daily    APIXABAN (ELIQUIS) 2.5 MG TABS TABLET    Take 1 tablet by mouth 2 times daily    ATORVASTATIN (LIPITOR) 40 MG TABLET    TAKE 1 AND 1/2 TABLETS EVERY DAY    BUDESONIDE (PULMICORT) 0.25 MG/2ML NEBULIZER SUSPENSION    Take 1 ampule by nebulization 2 times daily    CARBIDOPA-LEVODOPA (SINEMET)  MG PER TABLET    Take 1 tablet by mouth 4 times daily    CARVEDILOL (COREG) 12.5 MG TABLET    Take 12.5 mg by mouth 2 times daily (with meals)    CLONIDINE (CATAPRES) 0.1 MG TABLET    Take 0.1 mg by mouth 2 times daily    ESCITALOPRAM (LEXAPRO) 10 MG TABLET    Take 10 mg by mouth daily    FERROUS SULFATE (IRON 325) 325 (65 FE) MG TABLET    Take 325 mg by mouth daily (with breakfast)    FORMOTEROL (PERFOROMIST) 20 MCG/2ML NEBULIZER SOLUTION    Take 20 mcg by nebulization every 12 hours    FUROSEMIDE (LASIX) 40 MG TABLET    Take 1 tablet by mouth in the morning.     GUAIFENESIN (ROBITUSSIN) 100 MG/5ML SOLN ORAL SOLUTION    Take 200 mg by mouth every 6 hours as needed for Cough    HANDICAP PLACARD MISC    by Does not apply route DX: G20  parkinson's disease  Exp: 9/1/2024    LAMOTRIGINE (LAMICTAL) 25 MG TABLET    Take 50 mg by mouth 2 times daily     LIFT CHAIR MISC    by Does not apply route    MONTELUKAST (SINGULAIR) 10 MG TABLET    TAKE 1 TABLET EVERY DAY    MULTIPLE VITAMINS-MINERALS (THERAPEUTIC MULTIVITAMIN-MINERALS) TABLET    Take 1 tablet by mouth daily    PROPAFENONE (RYTHMOL) 150 MG TABLET    TAKE 1 TABLET BY MOUTH EVERY 8 HOURS    SCOPOLAMINE (TRANSDERM-SCOP) TRANSDERMAL PATCH    Place 1 patch onto the skin every 72 hours    SENNA-DOCUSATE (PERICOLACE) 8.6-50 MG PER TABLET    Take 1 tablet by mouth in the morning and at bedtime    SIMETHICONE (MYLICON) 40 CC/9.0IU LIQD DROPS    Take 40 mg by mouth in the morning, at noon, and at bedtime         ALLERGIES     Levaquin [levofloxacin], Metoclopramide, Phenazopyridine, Pyridium [phenazopyridine hcl], Reglan [metoclopramide hcl], and Reglan [metoclopramide hcl]    FAMILYHISTORY       Family History   Problem Relation Age of Onset    Diabetes Mother     Heart Disease Mother     Diabetes Father     Heart Disease Father     Diabetes Sister           SOCIAL HISTORY       Social History     Socioeconomic History    Marital status:      Spouse name: Giselle Sultana    Number of children: 2    Years of education: 12    Highest education level: None   Tobacco Use    Smoking status: Former    Smokeless tobacco: Never   Vaping Use    Vaping Use: Never used   Substance and Sexual Activity    Alcohol use: No    Drug use: No    Sexual activity: Not Currently   Social History Narrative    ** Merged History Encounter **          Social Determinants of Health     Housing Stability: Low Risk     Unable to Pay for Housing in the Last Year: No    Number of Places Lived in the Last Year: 1    Unstable Housing in the Last Year: No       SCREENINGS    New Coma Scale  Eye Opening: Spontaneous  Best Verbal Response: Oriented  Best Motor Response: Obeys commands  Roundup Coma Scale Score: 15        PHYSICAL EXAM    (up to 7 for level 4, 8 or more for level 5)     ED Triage Vitals [11/07/22 1216]   BP Temp Temp Source Heart Rate Resp SpO2 Height Weight   (!) 189/71 98.2 °F (36.8 °C) Oral 69 18 96 % 5' 6\" (1.676 m) 243 lb (110.2 kg)       Physical Exam  Vitals and nursing note reviewed. Constitutional:       General: She is awake. She is not in acute distress. Appearance: She is well-developed. She is ill-appearing (chronically). She is not toxic-appearing or diaphoretic. HENT:      Head: Normocephalic and atraumatic. Right Ear: External ear normal.      Left Ear: External ear normal.      Nose: Nose normal.      Mouth/Throat:      Mouth: Mucous membranes are moist.      Pharynx: Oropharynx is clear. Eyes:      General:         Right eye: No discharge. Left eye: No discharge. Extraocular Movements: Extraocular movements intact. Conjunctiva/sclera: Conjunctivae normal.      Pupils: Pupils are equal, round, and reactive to light. Cardiovascular:      Rate and Rhythm: Normal rate and regular rhythm. Pulses: Normal pulses. Heart sounds: Normal heart sounds. No murmur heard. No friction rub. No gallop. Pulmonary:      Effort: Pulmonary effort is normal. No respiratory distress. Breath sounds: Normal breath sounds. No wheezing or rales. Abdominal:      General: Abdomen is protuberant. The ostomy site is clean. Bowel sounds are normal. There is no distension. Palpations: Abdomen is soft. Tenderness:  There is abdominal tenderness in the epigastric area and periumbilical area. There is no guarding or rebound. Musculoskeletal:      Cervical back: Normal range of motion and neck supple. No rigidity. Lymphadenopathy:      Cervical: No cervical adenopathy. Skin:     General: Skin is warm and dry. Capillary Refill: Capillary refill takes less than 2 seconds. Neurological:      General: No focal deficit present. Mental Status: She is alert, oriented to person, place, and time and easily aroused. Psychiatric:         Behavior: Behavior normal. Behavior is cooperative. DIAGNOSTIC RESULTS   LABS:    Labs Reviewed - No data to display    All other labs were within normal range or not returned as of this dictation. EKG: All EKG's are interpreted by the Emergency Department Physician who either signs orCo-signs this chart in the absence of a cardiologist.  Please see their note for interpretation of EKG. RADIOLOGY:   Non-plain film images such as CT, Ultrasound and MRI are read by the radiologist. Plain radiographic images are visualized andpreliminarily interpreted by the  ED Provider with the below findings:        Interpretation perthe Radiologist below, if available at the time of this note:    No orders to display     No results found.        PROCEDURES   Unless otherwise noted below, none     Procedures    CRITICAL CARE TIME   N/A    CONSULTS:  None      EMERGENCY DEPARTMENT COURSE and DIFFERENTIALDIAGNOSIS/MDM:   Vitals:    Vitals:    11/07/22 1216   BP: (!) 189/71   Pulse: 69   Resp: 18   Temp: 98.2 °F (36.8 °C)   TempSrc: Oral   SpO2: 96%   Weight: 243 lb (110.2 kg)   Height: 5' 6\" (1.676 m)       Patient was given thefollowing medications:  Medications - No data to display    PDMP Monitoring:    Last PDMP Darryl Elders as Reviewed [facility]):  Review User Review Instant Review Result          Last Controlled Substance Monitoring Documentation      Flowsheet Row Refill from 2/19/2019 in [facility]   Attestation The Prescription Monitoring Report for this patient was reviewed today. filed at 02/19/2019 1142   Periodic Controlled Substance Monitoring No signs of potential drug abuse or diversion identified. filed at 02/19/2019 1142          Urine Drug Screenings (1 yr)    No resulted procedures found. Medication Contract and Consent for Opioid Use Documents Filed        No documents found                    MDM:   Patient seen and evaluated. Old records reviewed. Diagnostic testing reviewed and results discussed. I have independently evaluated this patient based upon my scope of practice. Supervising physician was in the department for consultation as needed. Patient is an 82-year-old female who presents for evaluation of problems with G-tube. Patient was seen and evaluated by myself. Physical exam is remarkable for appropriately placed G-tube. It is currently clogged. Our nursing staff was able to unclog the G-tube without difficulty however gastroenterology is requesting that patient be admitted for G-tube exchange. She was noting abdominal distention and some pain after tube feeds therefore CT abdomen pelvis with contrast was obtained no evidence of acute intra-abdominal pathology. Markable for mild kidney disease, slightly worse compared to prior. She subsequently has mild Trope elevation but is not complaining of any chest pain, delta Ragena Angelucci is pending. At this time I believe patient is reasonable candidate for admission for G-tube exchange. All information including ED workup, results, treatment, diagnosis has been reviewed and discussed with ED attending physician and directly discussed with Hospitalist who is the admitting physician. Pt will be admitted in stable condition. Pt advised of admission and is in full agreement. Is this patient to be included in the SEP-1 Core Measure due to severe sepsis or septic shock?    No   Exclusion criteria - the patient is NOT to be included for SEP-1 Core Measure due to:  2+ SIRS criteria are not met      FINAL IMPRESSION      1. PEG tube malfunction ([address]    2. Chronic kidney disease, unspecified CKD stage          DISPOSITION/PLAN   DISPOSITION        PATIENT REFERREDTO:  No follow-up provider specified. DISCHARGE MEDICATIONS:  New Prescriptions    No medications on file       DISCONTINUED MEDICATIONS:  Discontinued Medications    ALBUTEROL SULFATE  (90 BASE) MCG/ACT INHALER    INHALE 2 PUFFS INTO THE LUNGS EVERY 6 HOURS AS NEEDED FOR WHEEZING    ALPHA-LIPOIC ACID 300 MG TABS    Take by mouth daily    CETIRIZINE (ZYRTEC) 10 MG TABLET    TAKE 1/2 TABLET BY MOUTH EVERY DAY    DULOXETINE (CYMBALTA) 60 MG EXTENDED RELEASE CAPSULE    TAKE 1 CAPSULE EVERY DAY    FERROUS SULFATE (IRON 325) 325 (65 FE) MG TABLET    Take 325 mg by mouth 2 times daily     FLUTICASONE (FLONASE) 50 MCG/ACT NASAL SPRAY    USE 2 PUFFS IN EACH NOSTRIL DAILY    FLUTICASONE-VILANTEROL (BREO ELLIPTA) 100-25 MCG/INH AEPB INHALER    INHALE 1 PUFF EVERY DAY    IPRATROPIUM-ALBUTEROL (DUONEB) 0.5-2.5 (3) MG/3ML SOLN NEBULIZER SOLUTION    Inhale 3 mLs into the lungs every 6 hours as needed for Shortness of Breath DX: J45.40    LIDOCAINE (XYLOCAINE) 5 % OINTMENT    Apply topically every 3 hours as needed for Pain Apply topically as needed. METOPROLOL TARTRATE (LOPRESSOR) 50 MG TABLET    Take 0.5 tablets by mouth in the morning and 0.5 tablets before bedtime. PANTOPRAZOLE (PROTONIX) 40 MG TABLET    Take 1 tablet by mouth in the morning and 1 tablet in the evening. Take before meals.               (Please note that portions ofthis note were completed with a voice recognition program.  Efforts were made to edit the dictations but occasionally words are mis-transcribed.)    Rudy Tran (electronically signed)       Rudy Tran  11/07/22 2552

## 2022-11-08 ENCOUNTER — ANESTHESIA (OUTPATIENT)
Dept: ENDOSCOPY | Age: 82
End: 2022-11-08
Payer: MEDICARE

## 2022-11-08 VITALS
DIASTOLIC BLOOD PRESSURE: 82 MMHG | RESPIRATION RATE: 20 BRPM | HEART RATE: 86 BPM | TEMPERATURE: 98.2 F | WEIGHT: 237.2 LBS | HEIGHT: 66 IN | BODY MASS INDEX: 38.12 KG/M2 | OXYGEN SATURATION: 96 % | SYSTOLIC BLOOD PRESSURE: 177 MMHG

## 2022-11-08 PROBLEM — K94.23 GASTROSTOMY TUBE OBSTRUCTION (HCC): Status: ACTIVE | Noted: 2022-01-01

## 2022-11-08 LAB
ANION GAP SERPL CALCULATED.3IONS-SCNC: 10 MMOL/L (ref 3–16)
BASOPHILS ABSOLUTE: 0 K/UL (ref 0–0.2)
BASOPHILS RELATIVE PERCENT: 0.5 %
BUN BLDV-MCNC: 20 MG/DL (ref 7–20)
CALCIUM SERPL-MCNC: 8.3 MG/DL (ref 8.3–10.6)
CHLORIDE BLD-SCNC: 106 MMOL/L (ref 99–110)
CO2: 24 MMOL/L (ref 21–32)
CREAT SERPL-MCNC: 1.1 MG/DL (ref 0.6–1.2)
EOSINOPHILS ABSOLUTE: 0.2 K/UL (ref 0–0.6)
EOSINOPHILS RELATIVE PERCENT: 3 %
GFR SERPL CREATININE-BSD FRML MDRD: 51 ML/MIN/{1.73_M2}
GLUCOSE BLD-MCNC: 101 MG/DL (ref 70–99)
GLUCOSE BLD-MCNC: 107 MG/DL (ref 70–99)
GLUCOSE BLD-MCNC: 148 MG/DL (ref 70–99)
HCT VFR BLD CALC: 25.7 % (ref 36–48)
HEMOGLOBIN: 8.2 G/DL (ref 12–16)
LYMPHOCYTES ABSOLUTE: 1.2 K/UL (ref 1–5.1)
LYMPHOCYTES RELATIVE PERCENT: 14.1 %
MCH RBC QN AUTO: 28.8 PG (ref 26–34)
MCHC RBC AUTO-ENTMCNC: 32 G/DL (ref 31–36)
MCV RBC AUTO: 89.9 FL (ref 80–100)
MONOCYTES ABSOLUTE: 0.6 K/UL (ref 0–1.3)
MONOCYTES RELATIVE PERCENT: 6.9 %
NEUTROPHILS ABSOLUTE: 6.3 K/UL (ref 1.7–7.7)
NEUTROPHILS RELATIVE PERCENT: 75.5 %
PDW BLD-RTO: 13.2 % (ref 12.4–15.4)
PERFORMED ON: ABNORMAL
PERFORMED ON: ABNORMAL
PLATELET # BLD: 264 K/UL (ref 135–450)
PMV BLD AUTO: 8.9 FL (ref 5–10.5)
POTASSIUM REFLEX MAGNESIUM: 4.9 MMOL/L (ref 3.5–5.1)
RBC # BLD: 2.86 M/UL (ref 4–5.2)
SODIUM BLD-SCNC: 140 MMOL/L (ref 136–145)
WBC # BLD: 8.3 K/UL (ref 4–11)

## 2022-11-08 PROCEDURE — 94640 AIRWAY INHALATION TREATMENT: CPT

## 2022-11-08 PROCEDURE — G0378 HOSPITAL OBSERVATION PER HR: HCPCS

## 2022-11-08 PROCEDURE — 3700000001 HC ADD 15 MINUTES (ANESTHESIA): Performed by: INTERNAL MEDICINE

## 2022-11-08 PROCEDURE — 92526 ORAL FUNCTION THERAPY: CPT

## 2022-11-08 PROCEDURE — 7100000010 HC PHASE II RECOVERY - FIRST 15 MIN: Performed by: INTERNAL MEDICINE

## 2022-11-08 PROCEDURE — 36415 COLL VENOUS BLD VENIPUNCTURE: CPT

## 2022-11-08 PROCEDURE — 80048 BASIC METABOLIC PNL TOTAL CA: CPT

## 2022-11-08 PROCEDURE — 2580000003 HC RX 258: Performed by: NURSE ANESTHETIST, CERTIFIED REGISTERED

## 2022-11-08 PROCEDURE — 3700000000 HC ANESTHESIA ATTENDED CARE: Performed by: INTERNAL MEDICINE

## 2022-11-08 PROCEDURE — 7100000011 HC PHASE II RECOVERY - ADDTL 15 MIN: Performed by: INTERNAL MEDICINE

## 2022-11-08 PROCEDURE — 92610 EVALUATE SWALLOWING FUNCTION: CPT

## 2022-11-08 PROCEDURE — 99220 PR INITIAL OBSERVATION CARE/DAY 70 MINUTES: CPT | Performed by: INTERNAL MEDICINE

## 2022-11-08 PROCEDURE — 85025 COMPLETE CBC W/AUTO DIFF WBC: CPT

## 2022-11-08 PROCEDURE — 6360000002 HC RX W HCPCS: Performed by: NURSE ANESTHETIST, CERTIFIED REGISTERED

## 2022-11-08 PROCEDURE — 43762 RPLC GTUBE NO REVJ TRC: CPT | Performed by: INTERNAL MEDICINE

## 2022-11-08 PROCEDURE — 6370000000 HC RX 637 (ALT 250 FOR IP): Performed by: INTERNAL MEDICINE

## 2022-11-08 PROCEDURE — 2709999900 HC NON-CHARGEABLE SUPPLY: Performed by: INTERNAL MEDICINE

## 2022-11-08 PROCEDURE — 3609017100 HC EGD: Performed by: INTERNAL MEDICINE

## 2022-11-08 PROCEDURE — 6360000002 HC RX W HCPCS: Performed by: INTERNAL MEDICINE

## 2022-11-08 RX ORDER — IPRATROPIUM BROMIDE AND ALBUTEROL SULFATE 2.5; .5 MG/3ML; MG/3ML
1 SOLUTION RESPIRATORY (INHALATION) EVERY 4 HOURS PRN
Status: DISCONTINUED | OUTPATIENT
Start: 2022-11-08 | End: 2022-11-08 | Stop reason: HOSPADM

## 2022-11-08 RX ORDER — SODIUM CHLORIDE, SODIUM LACTATE, POTASSIUM CHLORIDE, CALCIUM CHLORIDE 600; 310; 30; 20 MG/100ML; MG/100ML; MG/100ML; MG/100ML
INJECTION, SOLUTION INTRAVENOUS CONTINUOUS PRN
Status: DISCONTINUED | OUTPATIENT
Start: 2022-11-08 | End: 2022-11-08 | Stop reason: SDUPTHER

## 2022-11-08 RX ORDER — PROPOFOL 10 MG/ML
INJECTION, EMULSION INTRAVENOUS PRN
Status: DISCONTINUED | OUTPATIENT
Start: 2022-11-08 | End: 2022-11-08 | Stop reason: SDUPTHER

## 2022-11-08 RX ADMIN — IPRATROPIUM BROMIDE AND ALBUTEROL SULFATE 1 AMPULE: 2.5; .5 SOLUTION RESPIRATORY (INHALATION) at 07:28

## 2022-11-08 RX ADMIN — BUDESONIDE 250 MCG: 0.5 SUSPENSION RESPIRATORY (INHALATION) at 07:28

## 2022-11-08 RX ADMIN — PROPOFOL 200 MG: 10 INJECTION, EMULSION INTRAVENOUS at 14:04

## 2022-11-08 RX ADMIN — OLODATEROL RESPIMAT INHALATION SPRAY 2 PUFF: 2.5 SPRAY, METERED RESPIRATORY (INHALATION) at 07:29

## 2022-11-08 RX ADMIN — SODIUM CHLORIDE, POTASSIUM CHLORIDE, SODIUM LACTATE AND CALCIUM CHLORIDE: 600; 310; 30; 20 INJECTION, SOLUTION INTRAVENOUS at 14:00

## 2022-11-08 RX ADMIN — ACETYLCYSTEINE 600 MG: 200 INHALANT RESPIRATORY (INHALATION) at 07:28

## 2022-11-08 ASSESSMENT — PAIN - FUNCTIONAL ASSESSMENT: PAIN_FUNCTIONAL_ASSESSMENT: NONE - DENIES PAIN

## 2022-11-08 NOTE — H&P
[address]  ENDOSCOPY  Outpatient Procedure H&P    Patient: Edwin Moreno MRN: [id number]     YOB: 1940  Age: [de-identified] y.o. Sex: female    Unit: [facility] ENDOSCOPY Room/Bed: ENDO/NONE Location: [address]     Procedure: Procedure(s):  EGD/PEG REPLACEMENT    Indication: PEG tube continually clogged referring  Physician:        Brief history: Continue problem with PEG tube clogging multiple times in the last 3 weeks. Nurses past medical history notes reviewed and agreed. Medications reviewed.     Allergies: Levaquin [levofloxacin], Metoclopramide, Phenazopyridine, Pyridium [phenazopyridine hcl], Reglan [metoclopramide hcl], and Reglan [metoclopramide hcl]     Allergies noted: Yes     Past Medical History:   Past Medical History:   Diagnosis Date    Anemia, unspecified 2010    neg bone marrow    Asthma     Atrial fibrillation ([address]     Baker's cyst     Basal cell carcinoma of left cheek 3/12/2019    Blind left eye     Carotid artery stenoses     Carotid stenosis 2002    left    Cervical spinal cord compression ([address] 11/2010    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-sided sciatica 8/9/2016    CKD (chronic kidney disease) stage 3, GFR 30-59 ml/min ([address]     Community acquired pneumonia of left lower lobe of lung 9/8/2018    CRI     Detached retina, left     Diverticulosis     DJD (degenerative joint disease)     Edema     chronic    Fatty liver     GERD (gastroesophageal reflux disease)     Hearing loss     Herniated lumbar intervertebral disc     Hx stage 2 sacral decubitus ulcer 4/13/2018    Hyperlipidemia     Hypertension     Hypothyroid 1/27/2015    Morbid obesity with BMI of 40.0-44.9, adult ([address] 2/10/2017    BETSY treated with BiPAP     Parkinson's disease ([address]     Partial symptomatic epilepsy with complex partial seizures, not intractable, without status epilepticus ([address] 1/24/2020    Restless leg syndrome     Restless legs syndrome     Rheumatic fever 1950    2x IN [facility] [place]    Sleep apnea     Tremor, essential 8/16/2010    Type 2 diabetes mellitus without complication [address]     Type II or unspecified type diabetes mellitus without mention of complication, not stated as uncontrolled        Past Surgical History:   Past Surgical History:   Procedure Laterality Date    APPENDECTOMY      BRONCHOSCOPY N/A 7/7/2022    BRONCHOSCOPY ALVEOLAR LAVAGE performed by Alfred Lang MD at [address]  7/7/2022    BRONCHOSCOPY THERAPUTIC ASPIRATION INITIAL performed by Alfred Lang MD at [address]      left    CATARACT REMOVAL      CERVICAL FUSION  11/2010    CHOLECYSTECTOMY      COLONOSCOPY N/A 3/8/2020    COLONOSCOPY FLEXIBLE W/ DECOMPRESSION performed by Gabo Del Real MD at [address] (CERVIX STATUS UNKNOWN)      IR MIDLINE CATH  10/8/2021    IR MIDLINE CATH 10/8/2021 [facility] PROC[facility]    JOINT REPLACEMENT Bilateral 2003    KNEE SURGERY      replacement x 2    OVARY REMOVAL      SHOULDER SURGERY      TONSILLECTOMY      TRACHEOSTOMY N/A 7/15/2022    TRACHEOTOMY WITH PERCUTANEOUS ENDOSCOPIC GASTROSTOMY TUBE PLACEMENT performed by Pinky Kim MD at [address] [facility]  03/17/2020    Esophagitis    UPPER GASTROINTESTINAL ENDOSCOPY N/A 3/17/2020    EGD performed by Bravo Ronquillo MD at [address]         Social History:   Social History     Socioeconomic History    Marital status:      Spouse name: Joel Oneal    Number of children: 2    Years of education: 12    Highest education level: Not on file   Occupational History    Not on file   Tobacco Use    Smoking status: Former    Smokeless tobacco: Never   Vaping Use    Vaping Use: Never used   Substance and Sexual Activity    Alcohol use: No    Drug use: No    Sexual activity: Not Currently   Other Topics Concern    Not morning and at bedtime   Yes Historical Provider, MD   simethicone (MYLICON) 40 FS/3.6GS LIQD drops Take 40 mg by mouth in the morning, at noon, and at bedtime   Yes Historical Provider, MD   Lift Chair [address] [facility] by Does not apply route 8/15/22   MAHAD Montano CNP   furosemide (LASIX) 40 MG tablet Take 1 tablet by mouth in the morning. 7/19/22   Ernestine Lazaro MD   acetylcysteine (MUCOMYST) 20 % nebulizer solution Inhale 3 mLs into the lungs 2 times daily 7/19/22   Ernestine Lazaro MD   montelukast (SINGULAIR) 10 MG tablet TAKE 1 TABLET EVERY DAY 5/26/22   MAHAD Montano CNP   apixaban (ELIQUIS) 2.5 MG TABS tablet Take 1 tablet by mouth 2 times daily 5/24/22   Alberto Bob MD   valsartan (DIOVAN) 80 MG tablet Take 1 tablet by mouth daily 5/24/22 7/19/22  Alberto Bob MD   NIFEdipine (PROCARDIA XL) 90 MG extended release tablet TAKE 1 TABLET EVERY DAY 3/2/22 7/19/22  MAHAD Cuadra CNP   atorvastatin (LIPITOR) 40 MG tablet TAKE 1 AND 1/2 TABLETS EVERY DAY 11/4/21   Alberto Bob MD   propafenone (RYTHMOL) 150 MG tablet TAKE 1 TABLET BY MOUTH EVERY 8 HOURS 11/3/21   Lexis Mijares MD   carbidopa-levodopa (SINEMET)  MG per tablet Take 1 tablet by mouth 4 times daily    Historical Provider, MD   lamoTRIgine (LAMICTAL) 25 MG tablet Take 50 mg by mouth 2 times daily     Historical Provider, MD   Handicap Placard MISC by Does not apply route DX: Noe Haim  parkinson's disease  Exp: 9/1/2024 9/27/19   BIJAN Diaz   Multiple Vitamins-Minerals (THERAPEUTIC MULTIVITAMIN-MINERALS) tablet Take 1 tablet by mouth daily    Historical Provider, MD   solifenacin (VESICARE) 5 MG tablet Take 1 tablet by mouth daily.  6/25/13 10/29/13  Guzman Kauffman MD       Review of Systems:  Weight Loss: No  Dysphagia: No  Dyspepsia: No    Physical Exam:   Vital Signs: BP (!) 192/66   Pulse 89   Temp 97.9 °F (36.6 °C) (Infrared)   Resp 16   Ht 5' 6\" (1.676 m)   Wt 237 lb 3.2 oz (107.6 kg)   SpO2 93%   BMI 38.29 kg/m²   Vital signs reviewed:Yes    HEENT:Normal  Cardiac:Normal  Chest:Normal  Abdomen:Normal  Exts: Normal  Neuro:Normal    Labs:  Admission on 11/07/2022   Component Date Value Ref Range Status    WBC 11/07/2022 11.5 (A)  4.0 - 11.0 K/uL Final    RBC 11/07/2022 3.45 (A)  4.00 - 5.20 M/uL Final    Hemoglobin 11/07/2022 9.8 (A)  12.0 - 16.0 g/dL Final    Hematocrit 11/07/2022 30.3 (A)  36.0 - 48.0 % Final    MCV 11/07/2022 87.6  80.0 - 100.0 fL Final    MCH 11/07/2022 28.3  26.0 - 34.0 pg Final    MCHC 11/07/2022 32.2  31.0 - 36.0 g/dL Final    RDW 11/07/2022 13.3  12.4 - 15.4 % Final    Platelets 26/73/4431 272  135 - 450 K/uL Final    MPV 11/07/2022 9.3  5.0 - 10.5 fL Final    Neutrophils % 11/07/2022 78.4  % Final    Lymphocytes % 11/07/2022 11.9  % Final    Monocytes % 11/07/2022 6.0  % Final    Eosinophils % 11/07/2022 3.1  % Final    Basophils % 11/07/2022 0.6  % Final    Neutrophils Absolute 11/07/2022 9.0 (A)  1.7 - 7.7 K/uL Final    Lymphocytes Absolute 11/07/2022 1.4  1.0 - 5.1 K/uL Final    Monocytes Absolute 11/07/2022 0.7  0.0 - 1.3 K/uL Final    Eosinophils Absolute 11/07/2022 0.4  0.0 - 0.6 K/uL Final    Basophils Absolute 11/07/2022 0.1  0.0 - 0.2 K/uL Final    Sodium 11/07/2022 138  136 - 145 mmol/L Final    Potassium reflex Magnesium 11/07/2022 5.2 (A)  3.5 - 5.1 mmol/L Final    Chloride 11/07/2022 100  99 - 110 mmol/L Final    CO2 11/07/2022 29  21 - 32 mmol/L Final    Anion Gap 11/07/2022 9  3 - 16 Final    Glucose 11/07/2022 113 (A)  70 - 99 mg/dL Final    BUN 11/07/2022 25 (A)  7 - 20 mg/dL Final    Creatinine 11/07/2022 1.3 (A)  0.6 - 1.2 mg/dL Final    Est, Glom Filt Rate 11/07/2022 41 (A)  >60 Final    Calcium 11/07/2022 9.8  8.3 - 10.6 mg/dL Final    Total Protein 11/07/2022 7.6  6.4 - 8.2 g/dL Final    Albumin 11/07/2022 3.7  3.4 - 5.0 g/dL Final    Albumin/Globulin Ratio 11/07/2022 0.9 (A)  1.1 - 2.2 Final    Total Bilirubin 11/07/2022 0.3  0.0 - 1.0 mg/dL Final    Alkaline Phosphatase 11/07/2022 134 (A)  40 - 129 U/L Final    ALT 11/07/2022 6 (A)  10 - 40 U/L Final    AST 11/07/2022 14 (A)  15 - 37 U/L Final    SARS-CoV-2 RNA, RT PCR 11/07/2022 NOT DETECTED  NOT DETECTED Final    INFLUENZA A 11/07/2022 NOT DETECTED  NOT DETECTED Final    INFLUENZA B 11/07/2022 NOT DETECTED  NOT DETECTED Final    Lactic Acid 11/07/2022 0.7  0.4 - 2.0 mmol/L Final    Lipase 11/07/2022 32.0  13.0 - 60.0 U/L Final    Color, UA 11/07/2022 Yellow  Straw/Yellow Final    Clarity, UA 11/07/2022 Clear  Clear Final    Glucose, Ur 11/07/2022 Negative  Negative mg/dL Final    Bilirubin Urine 11/07/2022 Negative  Negative Final    Ketones, Urine 11/07/2022 Negative  Negative mg/dL Final    Specific Gravity, UA 11/07/2022 1.015  1.005 - 1.030 Final    Blood, Urine 11/07/2022 Negative  Negative Final    pH, UA 11/07/2022 8.0  5.0 - 8.0 Final    Protein, UA 11/07/2022 30 (A)  Negative mg/dL Final    Urobilinogen, Urine 11/07/2022 0.2  <2.0 E.U./dL Final    Nitrite, Urine 11/07/2022 Negative  Negative Final    Leukocyte Esterase, Urine 11/07/2022 Negative  Negative Final    Microscopic Examination 11/07/2022 YES   Final    Urine Type 11/07/2022 NotGiven   Final    Urine Reflex to Culture 11/07/2022 Not Indicated   Final    Troponin 11/07/2022 0.02 (A)  <0.01 ng/mL Final    Ventricular Rate 11/07/2022 73  BPM Final    Atrial Rate 11/07/2022 73  BPM Final    P-R Interval 11/07/2022 142  ms Final    QRS Duration 11/07/2022 90  ms Final    Q-T Interval 11/07/2022 394  ms Final    QTc Calculation (Bazett) 11/07/2022 434  ms Final    P Axis 11/07/2022 86  degrees Final    R Axis 11/07/2022 60  degrees Final    T Axis 11/07/2022 72  degrees Final    Diagnosis 11/07/2022 Baseline artifactNormal sinus rhythmNormal ECGConfirmed by Naty Manuel ([id number]) on 11/7/2022 7:04:35 PM   Final    Troponin 11/07/2022 <0.01  <0.01 ng/mL Final    Mucus, UA 11/07/2022 Rare (A)  None Seen /LPF Final    WBC, UA 11/07/2022 3-5  0 - 5 /HPF Final    RBC, UA 11/07/2022 3-4  0 - 4 /HPF Final    Bacteria, UA 11/07/2022 1+ (A)  None Seen /HPF Final    POC Glucose 11/07/2022 108 (A)  70 - 99 mg/dl Final    Performed on 11/07/2022 ACCU-CHEK   Final    WBC 11/08/2022 8.3  4.0 - 11.0 K/uL Final    RBC 11/08/2022 2.86 (A)  4.00 - 5.20 M/uL Final    Hemoglobin 11/08/2022 8.2 (A)  12.0 - 16.0 g/dL Final    Hematocrit 11/08/2022 25.7 (A)  36.0 - 48.0 % Final    MCV 11/08/2022 89.9  80.0 - 100.0 fL Final    MCH 11/08/2022 28.8  26.0 - 34.0 pg Final    MCHC 11/08/2022 32.0  31.0 - 36.0 g/dL Final    RDW 11/08/2022 13.2  12.4 - 15.4 % Final    Platelets 03/47/0602 264  135 - 450 K/uL Final    MPV 11/08/2022 8.9  5.0 - 10.5 fL Final    Neutrophils % 11/08/2022 75.5  % Final    Lymphocytes % 11/08/2022 14.1  % Final    Monocytes % 11/08/2022 6.9  % Final    Eosinophils % 11/08/2022 3.0  % Final    Basophils % 11/08/2022 0.5  % Final    Neutrophils Absolute 11/08/2022 6.3  1.7 - 7.7 K/uL Final    Lymphocytes Absolute 11/08/2022 1.2  1.0 - 5.1 K/uL Final    Monocytes Absolute 11/08/2022 0.6  0.0 - 1.3 K/uL Final    Eosinophils Absolute 11/08/2022 0.2  0.0 - 0.6 K/uL Final    Basophils Absolute 11/08/2022 0.0  0.0 - 0.2 K/uL Final    Sodium 11/08/2022 140  136 - 145 mmol/L Final    Potassium reflex Magnesium 11/08/2022 4.9  3.5 - 5.1 mmol/L Final    Chloride 11/08/2022 106  99 - 110 mmol/L Final    CO2 11/08/2022 24  21 - 32 mmol/L Final    Anion Gap 11/08/2022 10  3 - 16 Final    Glucose 11/08/2022 101 (A)  70 - 99 mg/dL Final    BUN 11/08/2022 20  7 - 20 mg/dL Final    Creatinine 11/08/2022 1.1  0.6 - 1.2 mg/dL Final    Est, Glom Filt Rate 11/08/2022 51 (A)  >60 Final    Calcium 11/08/2022 8.3  8.3 - 10.6 mg/dL Final    POC Glucose 11/08/2022 148 (A)  70 - 99 mg/dl Final    Performed on 11/08/2022 ACCU-CHEK   Final    POC Glucose 11/08/2022 107 (A)  70 - 99 mg/dl Final    Performed on 11/08/2022 ACCU-CHEK   Final        Imaging:  CT ABDOMEN PELVIS WO CONTRAST Additional Contrast? None   Final Result   1. No acute findings in the abdomen or pelvis. 2. Percutaneous gastrostomy tube with the balloon at the gastric antrum. 3. New airspace opacity at the medial aspect of the right lower lobe that   could reflect atelectasis or pneumonia. XR ABDOMEN (KUB) (SINGLE AP VIEW)   Final Result   Normally located percutaneous gastrostomy tube. No evidence of bowel obstruction. ASA:2    Mallampati Score: III    Sedation planned:MAC    Patient in acceptable condition for procedure:Yes    2:14 PM 11/8/2022    [facility], DO      Please note that some or all of this record was generated using voice recognition software. If there are any questions about the content of this document, please contact the author as some errors in transcription may have occurred. The patient and I discussed that this is an elective procedure/surgery. We discussed the risks of the procedure/surgery, including but not limited to what is outlined in the signed informed consent. We also discussed the risk of felix COVID 19 while in the facility. We discussed the increased risk of a bad outcome should the patient contract COVID 19 during the post-procedural/post-operative period, given the patients current health condition, chronic conditions, and the added risk of COVID 19 in light of these conditions. The patient and I also discussed the risk of further postponing the procedure/surgery and other treatment alternatives, including non-procedural/surgical treatments. Understanding all of the risks, benefits, and alternatives, the patient made an informed decision to proceed with the procedure/surgery.

## 2022-11-08 NOTE — PROGRESS NOTES
Occupational Therapy/ Physical Therapy   Attempted OTPT eval and pt off the floor for procedure.    Daphne Weber OTR/L [id number]

## 2022-11-08 NOTE — CARE COORDINATION
DISCHARGE ORDER  Date/Time 2022 3:18 PM  Completed by: Tiara Lang RN, Case Management    Patient Name: Tawanda Whiting    : 1940      Admit order Date and Status:2022 obs  Noted discharge order. (verify MD's last order for status of admission/Traditional Medicare 3 [place] Inpatient qualifying stay required for SNF)    Confirmed discharge plan with:              Patient:  Yes              When pt confirms DC plan does any support person need to be contacted by CM No if yes who______                      Discharge to Facility: [facility] phone number for staff giving report: [phone]   Pre-certification completed: not needed [facility] Exemption Notification (HENS) completed: returning to [facility]   Discharge orders and Continuity of Care faxed to facility:  Charley to pull through [facility]      Transportation:               Medical Transport explained with choice list offered to pt/family. Choice:Yes(no preference)  Agency used: [employer]   time:   [phone]      Pt/family/Nursing/Facility aware of  time:   Yes Names: pt/bedside RN Imelda/Charley @ [facility]  Ambulance form completed:  yesd:      Date Last IMM Given: OBS    Comments:Chart reviewed. Pt is returning to [facility] @ [facility] and is aware of  time. Per Charley @ [facility] she can accept pt back today, she is aware of  time. Pt had negative covid test yesterday. Pt is being d/c'd to [facility] today. Pt's O2 sats are 96% on RA. Discharge timeout done with Imelda. All discharge needs and concerns addressed. Discharging nurse to complete MICA, reconcile AVS, and place final copy with patient's discharge packet. Discharging RN to ensure that written prescriptions for  Level II medications are sent with patient to the facility as per protocol.

## 2022-11-08 NOTE — CONSULTS
Consultation Note    Patient Name: Bal Wilson  : 1940  Age: [de-identified] y.o. Admitting Physician: Ernestine Lazaro, *   Date of Admission: 2022 12:06 PM   Primary Care Physician: Guzman Kauffman MD        Bal Wilson is being seen at the request of Ernestine Lazaro, * for G tube needs replaced. History of Present Illness:  [de-identified]year old F with PMH significant for asthma, A Fib, basal cell carcinoma, carotid stenosis, CKD, fatty liver, GERD, hyperlipidemia, HTN, hypothyroidism, Parkinson's disease, restless leg syndrome, rheumatic fever, sleep apnea, and diabetes mellitus type II. Abdominal surgeries include appendectomy, cholecystectomy, hernia repair, hysterectomy, and G tube placement. Patient was seen in the office by Dr. Radha Whitmore on  with complaints of PEG tube malfunction and anemia. PEG tube was placed 7/15/2022 by Dr Bernice Michael at the time of trach placement. She has been ongoing issues with PEG tube becoming clogged. She contacted  Dr. Radha Whitmore again yesterday with same complaints. He recommended she present to the hospital and have PEG tube replaced. She also complains of abdominal bloating and pain with feeds. CT A/P was obtained in the ER with no acute abnormalities.       GI History:  3/17/2020 EGD  Findings:  -Moderate esophagitis, likely reflux  -retained contents in the stomach concerning for ileus vs gastroparesis  -otherwise normal EGD     3/8/2020 Colonoscopy   Findings:  -distended transverse colon with intact mucosa s/p decompression tube placement  -stool limited visibility    2014 Colonoscopy  Findings:  -colonic polyps and diverticulosis    Past Medical History:  Past Medical History:   Diagnosis Date    Anemia, unspecified     neg bone marrow    Asthma     Atrial fibrillation ([facility]     Baker's cyst     Basal cell carcinoma of left cheek 3/12/2019    Blind left eye     Carotid artery stenoses     Carotid stenosis     left    Cervical spinal cord compression ([address] 11/2010    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-sided sciatica 8/9/2016    CKD (chronic kidney disease) stage 3, GFR 30-59 ml/min ([facility])     Community acquired pneumonia of left lower lobe of lung 9/8/2018    CRI     Detached retina, left     Diverticulosis     DJD (degenerative joint disease)     Edema     chronic    Fatty liver     GERD (gastroesophageal reflux disease)     Hearing loss     Herniated lumbar intervertebral disc     Hx stage 2 sacral decubitus ulcer 4/13/2018    Hyperlipidemia     Hypertension     Hypothyroid 1/27/2015    Morbid obesity with BMI of 40.0-44.9, adult ([address] 2/10/2017    BETSY treated with BiPAP     Parkinson's disease ([address]     Partial symptomatic epilepsy with complex partial seizures, not intractable, without status epilepticus ([address] 1/24/2020    Restless leg syndrome     Restless legs syndrome     Rheumatic fever 1950    2x IN [place] [place]    Sleep apnea     Tremor, essential 8/16/2010    Type 2 diabetes mellitus without complication ([address]     Type II or unspecified type diabetes mellitus without mention of complication, not stated as uncontrolled         Past Surgical History:  Past Surgical History:   Procedure Laterality Date    APPENDECTOMY      BRONCHOSCOPY N/A 7/7/2022    BRONCHOSCOPY ALVEOLAR LAVAGE performed by Creta Gaucher, MD at [place] [address]  7/7/2022    BRONCHOSCOPY [address] performed by Creta Gaucher, MD at [address]      left    CATARACT REMOVAL      CERVICAL FUSION  11/2010    CHOLECYSTECTOMY      COLONOSCOPY N/A 3/8/2020    COLONOSCOPY FLEXIBLE W/ DECOMPRESSION performed by Patricia Deluna MD at [address] [facility] (CERVIX STATUS UNKNOWN)      IR MIDLINE CATH  10/8/2021    IR MIDLINE CATH 10/8/2021 [address] SPECIAL PROCEDURES    JOINT REPLACEMENT Bilateral 2003    KNEE SURGERY      replacement x 2 OVARY REMOVAL      SHOULDER SURGERY      TONSILLECTOMY      TRACHEOSTOMY N/A 7/15/2022    TRACHEOTOMY WITH PERCUTANEOUS ENDOSCOPIC GASTROSTOMY TUBE PLACEMENT performed by Aren Isaac MD at [address] [facility] ENDOSCOPY  03/17/2020    Esophagitis    UPPER GASTROINTESTINAL ENDOSCOPY N/A 3/17/2020    EGD performed by Héctor Kahn MD at [address]          Historical Medications:  Prior to Visit Medications    Medication Sig Taking?  Authorizing Provider   acetaminophen (TYLENOL) 325 MG tablet Take 650 mg by mouth every 6 hours as needed for Pain Yes Historical Provider, MD   amLODIPine (NORVASC) 5 MG tablet Take 5 mg by mouth daily Yes Historical Provider, MD   budesonide (PULMICORT) 0.25 MG/2ML nebulizer suspension Take 1 ampule by nebulization 2 times daily Yes Historical Provider, MD   carvedilol (COREG) 12.5 MG tablet Take 12.5 mg by mouth 2 times daily (with meals) Yes Historical Provider, MD   cloNIDine (CATAPRES) 0.1 MG tablet Take 0.1 mg by mouth 2 times daily Yes Historical Provider, MD   formoterol (PERFOROMIST) 20 MCG/2ML nebulizer solution Take 20 mcg by nebulization every 12 hours Yes Historical Provider, MD   guaiFENesin (ROBITUSSIN) 100 MG/5ML SOLN oral solution Take 200 mg by mouth every 6 hours as needed for Cough Yes Historical Provider, MD   ferrous sulfate (IRON 325) 325 (65 Fe) MG tablet Take 325 mg by mouth daily (with breakfast) Yes Historical Provider, MD   escitalopram (LEXAPRO) 10 MG tablet Take 10 mg by mouth daily Yes Historical Provider, MD   scopolamine (TRANSDERM-SCOP) transdermal patch Place 1 patch onto the skin every 72 hours Yes Historical Provider, MD   senna-docusate (PERICOLACE) 8.6-50 MG per tablet Take 1 tablet by mouth in the morning and at bedtime Yes Historical Provider, MD   simethicone (MYLICON) 40 XX/9.2QC LIQD drops Take 40 mg by mouth in the morning, at noon, and at bedtime Yes Historical Provider, MD   Lift Chair [address] [facility] by Does not apply route  MAHAD Murray - CNP   furosemide (LASIX) 40 MG tablet Take 1 tablet by mouth in the morning. Laura Walker MD   acetylcysteine (MUCOMYST) 20 % nebulizer solution Inhale 3 mLs into the lungs 2 times daily  Laura Walker MD   montelukast (SINGULAIR) 10 MG tablet TAKE 1 TABLET EVERY DAY  MAHAD Clemente CNP   apixaban (ELIQUIS) 2.5 MG TABS tablet Take 1 tablet by mouth 2 times daily  Sandor Ramirez MD   valsartan (DIOVAN) 80 MG tablet Take 1 tablet by mouth daily  Sandor Ramirez MD   NIFEdipine (PROCARDIA XL) 90 MG extended release tablet TAKE 1 TABLET EVERY DAY  MAHAD Rose CNP   atorvastatin (LIPITOR) 40 MG tablet TAKE 1 AND 1/2 TABLETS EVERY DAY  Sandor Ramirez MD   propafenone (RYTHMOL) 150 MG tablet TAKE 1 TABLET BY MOUTH EVERY 8 HOURS  Russ George MD   carbidopa-levodopa (SINEMET)  MG per tablet Take 1 tablet by mouth 4 times daily  Historical Provider, MD   lamoTRIgine (LAMICTAL) 25 MG tablet Take 50 mg by mouth 2 times daily   Historical Provider, MD   Handicap Placard MISC by Does not apply route DX: G20  parkinson's disease  Exp: 9/1/2024  BIJAN Torres   Multiple Vitamins-Minerals (THERAPEUTIC MULTIVITAMIN-MINERALS) tablet Take 1 tablet by mouth daily  Historical Provider, MD   solifenacin (VESICARE) 5 MG tablet Take 1 tablet by mouth daily.   Franck Main MD        Hospital Medications:  Current Facility-Administered Medications: ipratropium-albuterol (DUONEB) nebulizer solution 1 ampule, 1 ampule, Inhalation, Q4H PRN  diatrizoate meglumine-sodium (GASTROGRAFIN) 66-10 % solution 12 mL, 12 mL, Oral, ONCE PRN  therapeutic multivitamin-minerals 1 tablet, 1 tablet, Oral, Daily  carbidopa-levodopa (SINEMET)  MG per tablet 1 tablet, 1 tablet, Oral, 4x Daily  lamoTRIgine (LAMICTAL) tablet 50 mg, 50 mg, Oral, BID  atorvastatin (LIPITOR) tablet 10 mg, 10 mg, Oral, Daily  propafenone (RYTHMOL) tablet 150 mg, 150 mg, Oral, 3 times per day  apixaban (ELIQUIS) tablet 2.5 mg, 2.5 mg, Oral, BID  montelukast (SINGULAIR) tablet 10 mg, 10 mg, Oral, Nightly  furosemide (LASIX) tablet 40 mg, 40 mg, Oral, Daily  acetylcysteine (MUCOMYST) 20 % solution 600 mg, 600 mg, Inhalation, BID  amLODIPine (NORVASC) tablet 5 mg, 5 mg, Oral, Daily  budesonide (PULMICORT) nebulizer suspension 250 mcg, 250 mcg, Nebulization, BID  carvedilol (COREG) tablet 12.5 mg, 12.5 mg, Oral, BID WC  cloNIDine (CATAPRES) tablet 0.1 mg, 0.1 mg, Oral, BID  guaiFENesin (ROBITUSSIN) 100 MG/5ML oral solution 200 mg, 200 mg, Oral, Q6H PRN  ferrous sulfate (IRON 325) tablet 325 mg, 325 mg, Oral, Daily with breakfast  escitalopram (LEXAPRO) tablet 10 mg, 10 mg, Oral, Daily  scopolamine (TRANSDERM-SCOP) transdermal patch 1 patch, 1 patch, TransDERmal, Q72H  sennosides-docusate sodium (SENOKOT-S) 8.6-50 MG tablet 1 tablet, 1 tablet, Oral, BID  simethicone (MYLICON) chewable tablet 40 mg, 40 mg, Oral, TID  olodaterol (STRIVERDI RESPIMAT) inhaler 2 puff, 2 puff, Inhalation, Daily  sodium chloride flush 0.9 % injection 5-40 mL, 5-40 mL, IntraVENous, 2 times per day  sodium chloride flush 0.9 % injection 5-40 mL, 5-40 mL, IntraVENous, PRN  0.9 % sodium chloride infusion, , IntraVENous, PRN  ondansetron (ZOFRAN-ODT) disintegrating tablet 4 mg, 4 mg, Oral, Q8H PRN **OR** ondansetron (ZOFRAN) injection 4 mg, 4 mg, IntraVENous, Q6H PRN  polyethylene glycol (GLYCOLAX) packet 17 g, 17 g, Oral, Daily PRN  acetaminophen (TYLENOL) tablet 650 mg, 650 mg, Oral, Q6H PRN **OR** acetaminophen (TYLENOL) suppository 650 mg, 650 mg, Rectal, Q6H PRN  0.9 % sodium chloride infusion, , IntraVENous, Continuous     Social History:   Social History       Tobacco History       Smoking Status  Former      Smokeless Tobacco Use  Never              Alcohol History       Alcohol Use Status  No              Drug Use       Drug Use Status  No Sexual Activity       Sexually Active  Not Currently                     Family History:  Family History   Problem Relation Age of Onset    Diabetes Mother     Heart Disease Mother     Diabetes Father     Heart Disease Father     Diabetes Sister         Allergies: Allergies   Allergen Reactions    Levaquin [Levofloxacin]      Pt was given in er and developed hives and redness    Metoclopramide     Phenazopyridine     Pyridium [Phenazopyridine Hcl]     Reglan [Metoclopramide Hcl]     Reglan [Metoclopramide Hcl]      tremors        ROS:   General: No fever or weight change  Hematologic: No unexpected submucosal bleeding or bruising  HEENT: No sore throat or facial pain  Respiratory: No cough or dyspnea  Cardiovascular: No angina or dependent edema  Gastrointestinal: See HPI  Musculoskeletal: No usual joint pain or stiffness  Skin: No skin eruptions or changing lesions  Neurologic: No focal weakness or numbness  Psychiatric: No anxiety or sleep disturbance    Physical Exam:  Vital Signs:   Vitals:    11/08/22 1420   BP: (!) 177/82   Pulse: 86   Resp: 20   Temp:    SpO2: 96%       General: Well-nourished, well-developed  HEENT: Sclera anicteric, mucosal membranes moist  Cardiovascular: Regular rate and rhythm. No murmurs. Respiratory: Respirations nonlabored, no crepitus  GI: Abdomen nondistended, soft, and nontender. Normal active bowel sounds. No masses palpable. Rectal: Deferred  Musculoskeletal: No pitting edema of the lower legs. Neurological: Gross memory appears intact. Patient is alert and oriented      Recent Imaging:   CT ABDOMEN PELVIS WO CONTRAST Additional Contrast? None  Narrative: EXAMINATION:  CT OF THE ABDOMEN AND PELVIS WITHOUT CONTRAST 11/7/2022 12:40 pm    TECHNIQUE:  CT of the abdomen and pelvis was performed without the administration of  intravenous contrast. Multiplanar reformatted images are provided for review.   Automated exposure control, iterative reconstruction, and/or weight based  adjustment of the mA/kV was utilized to reduce the radiation dose to as low  as reasonably achievable. COMPARISON:  CT of the abdomen and pelvis 07/02/2022 and chest CT 10/04/2022. HISTORY:  ORDERING SYSTEM PROVIDED HISTORY: abd pain with tube feeds  TECHNOLOGIST PROVIDED HISTORY:  Reason for exam:->abd pain with tube feeds  Additional Contrast?->None  Reason for Exam: abd pain with tube feeds, gfr of 41, w/o IV contrast per PA  [place]    FINDINGS:  Lower Chest: New dependent airspace consolidation involves the posterior  basal segment of the right lower lobe. There are trace pleural effusions. Organs: The liver, spleen, adrenal glands and pancreas are unremarkable. There has been a cholecystectomy. There is a small exophytic hyperdense cyst  laterally at the left kidney. There is no ureteral stone or hydronephrosis. GI/Bowel: There is no bowel dilatation or bowel wall thickening. There are  colonic diverticula. There is a percutaneous gastrostomy tube in the gastric  antrum. The stomach is unremarkable. Pelvis: Bladder is unremarkable. Peritoneum/Retroperitoneum: The abdominal aorta is normal in caliber. No fat  stranding, free fluid, free air or focal fluid collection is identified. Bones/Soft Tissues: No destructive bone lesion is identified. Impression: 1. No acute findings in the abdomen or pelvis. 2. Percutaneous gastrostomy tube with the balloon at the gastric antrum. 3. New airspace opacity at the medial aspect of the right lower lobe that  could reflect atelectasis or pneumonia. XR ABDOMEN (KUB) (SINGLE AP VIEW)  Narrative: EXAMINATION:  ONE SUPINE XRAY VIEW(S) OF THE ABDOMEN    11/7/2022 1:39 pm    COMPARISON:  Radiograph 07/02/2022 and CT 07/02/2022.     HISTORY:  ORDERING SYSTEM PROVIDED HISTORY: confirmation of PEG Tube  TECHNOLOGIST PROVIDED HISTORY:  Reason for exam:->confirmation of PEG Tube  Reason for Exam: confirmation of PEG Tube    FINDINGS:  There is no dilated bowel. There is a percutaneous Crestor ostomy tube in  the stomach with contrast in the stomach. Linear opacities along the tract  of the tube are consistent with chondral calcifications present previously. Impression: Normally located percutaneous gastrostomy tube. No evidence of bowel obstruction. Labs:   Recent Labs     11/07/22  1416 11/08/22  0526   HGB 9.8* 8.2*   WBC 11.5* 8.3   PROT 7.6  --    LABALBU 3.7  --    ALKPHOS 134*  --    ALT 6*  --    AST 14*  --    BILITOT 0.3  --         Assessment:    [de-identified]year old F with PMH significant for asthma, A Fib, basal cell carcinoma, carotid stenosis, CKD, fatty liver, GERD, hyperlipidemia, HTN, hypothyroidism, Parkinson's disease, restless leg syndrome, rheumatic fever, sleep apnea, and diabetes mellitus type II. Clogged PEG tube. CT without acute abnormalities. Plan:  Keep NP  Plan for PEG tube replacement  Supportive care       Reina Cr, MAHAD - CNP    [address]    [phone].  Also available via [employer]

## 2022-11-08 NOTE — FLOWSHEET NOTE
11/07/22 2046   Vital Signs   Temp 97.4 °F (36.3 °C)   Temp Source Oral   Heart Rate 83   Heart Rate Source Monitor   Resp 24   /67   BP Location Left upper arm   BP Method Automatic   MAP (Calculated) 86   Patient Position High fowlers   Pain Assessment   Pain Assessment None - Denies Pain   Opioid-Induced Sedation   POSS Score 1   Oxygen Therapy   SpO2 94 %   O2 Device None (Room air)   Height and Weight   Height 5' 6\" (1.676 m)   Weight 237 lb 3.2 oz (107.6 kg)   BSA (Calculated - sq m) 2.24 sq meters   BMI (Calculated) 38.4   Arrived to floor from ER

## 2022-11-08 NOTE — ACP (ADVANCE CARE PLANNING)
Advance Care Planning     General Advance Care Planning (ACP) Conversation    Date of Conversation: 11/7/2022  Conducted with: Patient with Decision Making Capacity   Healthcare Decision Maker: Next of Kin by law (only applies in absence of above) (name) pt's  Roberto Carlos Jacinto at [facility]:    Primary Decision Maker: Yuli Campa - Spouse - [phone]  Click here to complete Healthcare Decision Makers including selection of the Healthcare Decision Maker Relationship (ie \"Primary\"). Today we documented Decision Maker(s) consistent with Legal Next of Kin hierarchy.     Content/Action Overview:    Reviewed DNR/DNI and patient elects Full Code (Attempt Resuscitation)  ventilation preferences  Pt stated she would want a vent if medically necessary     Length of Voluntary ACP Conversation in minutes:  <16 minutes (Non-Billable)    Allyson RODRIGUEZ, JENNIFER

## 2022-11-08 NOTE — PROGRESS NOTES
Patient discharged with [facility]. Patient alert and oriented, denies complaints, in stable condition.

## 2022-11-08 NOTE — PLAN OF CARE
Problem: Discharge Planning  Goal: Discharge to home or other facility with appropriate resources  11/8/2022 1523 by Bc Valencia RN  Outcome: Completed     Problem: Skin/Tissue Integrity  Goal: Absence of new skin breakdown  Description: 1. Monitor for areas of redness and/or skin breakdown  2. Assess vascular access sites hourly  3. Every 4-6 hours minimum:  Change oxygen saturation probe site  4. Every 4-6 hours:  If on nasal continuous positive airway pressure, respiratory therapy assess nares and determine need for appliance change or resting period.   11/8/2022 1523 by Bc Valencia RN  Outcome: Completed     Problem: ABCDS Injury Assessment  Goal: Absence of physical injury  11/8/2022 1523 by Bc Valencia RN  Outcome: Completed     Problem: Safety - Adult  Goal: Free from fall injury  11/8/2022 1523 by Bc Valencia RN  Outcome: Completed     Problem: Chronic Conditions and Co-morbidities  Goal: Patient's chronic conditions and co-morbidity symptoms are monitored and maintained or improved  Outcome: Completed     Problem: Nutrition Deficit:  Goal: Optimize nutritional status  Outcome: Completed

## 2022-11-08 NOTE — PROGRESS NOTES
Report given to [facility] at [facility], for p/u at [phone].

## 2022-11-08 NOTE — PLAN OF CARE
Bedside swallow evaluation completed this date.     Zoila Mcmahan M.S. [address] [facility]  Speech-language pathologist  [phone]

## 2022-11-08 NOTE — ANESTHESIA POSTPROCEDURE EVALUATION
Department of Anesthesiology  Postprocedure Note    Patient: Alejandro Stern  MRN: [id number]  YOB: 1940  Date of evaluation: 11/8/2022      Procedure Summary     Date: 11/08/22 Room / Location: [place] [address] / [facility]    Anesthesia Start: 1400 Anesthesia Stop: 4891    Procedure: EGD/PEG REPLACEMENT Diagnosis:       PEG tube malfunction ([address]      (PEG TUBE REPLACEMENT)    Surgeons: Lyndle Lesch, DO Responsible Provider: Adrien Penaloza MD    Anesthesia Type: general ASA Status: 3          Anesthesia Type: No value filed.     Chelsea Phase I: Chelsea Score: 10    Chelsea Phase II: Chelsea Score: 10      Anesthesia Post Evaluation    Comments: Postoperative Anesthesia Note    Name:    Alejandro Stern  MRN:      [id number]    Patient Vitals in the past 12 hrs:  11/08/22 1420, BP:(!) 177/82, Pulse:86, Resp:20, SpO2:96 %  11/08/22 1419, BP:(!) 145/98, Temp:98.2 °F (36.8 °C), Temp src:Temporal, Pulse:90, Resp:16, SpO2:97 %  11/08/22 1302, Height:5' 6\" (1.676 m)  11/08/22 1232, BP:(!) 192/66, Temp:97.9 °F (36.6 °C), Temp src:Infrared, Pulse:89, Resp:16, SpO2:93 %  11/08/22 0758, BP:(!) 150/63, Temp:97.5 °F (36.4 °C), Temp src:Oral, Pulse:85, Resp:16, SpO2:100 %  11/08/22 0739, Pulse:85, Resp:20, SpO2:95 %     LABS:    CBC  Lab Results       Component                Value               Date/Time                  WBC                      8.3                 11/08/2022 05:26 AM        HGB                      8.2 (L)             11/08/2022 05:26 AM        HCT                      25.7 (L)            11/08/2022 05:26 AM        PLT                      264                 11/08/2022 05:26 AM   RENAL  Lab Results       Component                Value               Date/Time                  NA                       140                 11/08/2022 05:26 AM        K                        4.9                 11/08/2022 05:26 AM        CL                       106                 11/08/2022 05:26 AM        CO2                      24                  11/08/2022 05:26 AM        BUN                      20                  11/08/2022 05:26 AM        CREATININE               1.1                 11/08/2022 05:26 AM        GLUCOSE                  101 (H)             11/08/2022 05:26 AM   COAGS  Lab Results       Component                Value               Date/Time                  PROTIME                  19.0 (H)            06/19/2022 10:04 AM        INR                      1.61 (H)            06/19/2022 10:04 AM        APTT                     19.5 (L)            04/09/2021 12:05 PM     Intake & Output:  @10RQPV@    Nausea & Vomiting:  No    Level of Consciousness:  Awake    Pain Assessment:  Adequate analgesia    Anesthesia Complications:  No apparent anesthetic complications    SUMMARY      Vital signs stable  OK to discharge from Stage I post anesthesia care.   Care transferred from Anesthesiology department on discharge from perioperative area

## 2022-11-08 NOTE — CARE COORDINATION
Case Management Assessment  Initial Evaluation      Patient Name: Gely Tomlin  YOB: 1940  Diagnosis: PEG tube malfunction ([facility] [K94.23]  Gastrojejunostomy tube dislodgement [T85.528A]  Chronic kidney disease, unspecified CKD stage [N18.9]  Date / Time: 11/7/2022 12:06 PM    Admission status/Date:11/7/2022 Observation   Chart Reviewed: Yes      Patient Interviewed: Yes   Family Interviewed:  Yes - pt's  Lisa Torres at bedside with pt's permission       Hospitalization in the last 30 days:  No    Health Care Decision Maker :   Primary Decision Maker: Arlene Bud - Spouse - [phone]    (CM - must 1st enter selection under Navigator - emergency contact- Health Care Decision Maker Relationship and pick relationship)   Who do you trust or have selected to make healthcare decisions for you    Met with: pt and  at bedside    Current PCP: Winnifred Simmonds, MD    Financial  Medicare and [place] healthcare  Precert required for SNF : N          3 night stay required - N    ADLS  Support Systems/Care Needs: Spouse/Significant Other  Transportation:  currently EMS transport     Meal Preparation: currently staff at [facility]    Housing  Living Arrangements: pt has been at [facility] for rehab for the last 88 days. She went to [facility] from [place] where she was at for about 26 days.  Pt's  stated her trach [facility] at [facility]  Steps: 0  Intent for return to present living arrangements: Yes  Identified Issues: [id number] [facility] with Home Health Care : No Agency:(Services)  Type of Home Care Services: None  Passport/Waiver : No  :                      Phone Number:    Passport/Waiver Services: n/a          Durable Medical Equiptment   DME Provider: currently provided by [facility]  Equipment:   Walker___Cane___RTS___ BSC___Shower Chair___Hospital Bed___W/C____Other________  02 at __at night with her home bipap that she brought to [facility]__Liter(s)---wears(frequency)_______ HHN ___ CPAP___ BiPap___   N/A____    Home O2 Use :  per facility if needed      Community Service Affiliation  Dialysis:  No    Agency:  Location:  Dialysis Schedule:  Phone:   Fax: Other Community Services: n/a    DISCHARGE PLAN: Explained Case Management role/services. Chart review completed. Spoke with pt and pt's  at bedside with pt's permission. Pt stated that she will return to [facility] when she is discharged to continue her rehab and her goal is to return home in a few weeks from [facility]. They stated first opening on transportation. Pt stated that her feeding tube is getting replaced. Spoke with Charley at [facility] who states pt can return when discharged and PT/OT is needed prior to pt returning as pt was there skilled. RN CM aware    CM will follow. Please notify CM if needs or concerns arise.      Sena Beverly MSW, LISMABEL-S

## 2022-11-08 NOTE — DISCHARGE SUMMARY
Name:  Yuli Azul  Room:  2229/6741-60  MRN:    [id number]    Discharge Summary      This discharge summary is in conjunction with a complete physical exam done on the day of discharge. Discharging Physician: Dr. Zoila Fajardo: 11/7/2022  Discharge:  11/8/22    HPI taken from admission H&P:       The patient is a 51-year-old white female with a past medical history of atrial fibrillation, Parkinson's disease, CKD, obstructive sleep apnea who spent 34 days in the hospital from June through July 2022. At that time she is admitted for acute respiratory failure, aspiration pneumonia, left pleural effusion, sepsis, dysphagia status post PEG tube placement, GI bleed, FARHEEN on CKD and hypertension. Patient is discharged to nursing home. She was on a vent at time of discharge but has since been liberated from the vent. She presented to the nursing home yesterday because of issues with her G-tube. The G-tube has been clogged. In the ER her G-tube was unclogged. GI was consulted. Plans was to replace the G-tube and she was admitted to hospital for observation. According to the  the patient has been seen by speech therapy at the nursing home.   She has been tolerating applesauce but has still not eaten solid food             Diagnoses this Admission and Hospital Course   #G tube malfunction   -hx of dysphagia   -CT abdomen neg for acute pathology   -NPO   -GI consulted planning for EGD with exchange today --> replaced today      #Atrial fibrillation  -on eliquis for AC   -on rythmol      #CKD Stage III   -kidney function stable   -continue to monitor Cr     #Elevated troponin   -0.02 --> <0.01  -no CP   -EKG with acute ischemic changes      #FÁTIMA   -on ferrous sulfate   -continue to monitor Hgb      #Hx of seizures   -on lamictal      #Parkinsons disease   -on sinemet      #HTN   -on norvasc, coreg, clonidine, lasix      #HLD   -on statin      #Carotid artery stenosis   -s/p CEA      #Depression -on lexapro     Procedures (Please Review Full Report for Details)  EGD/PEG tube placement     Consults    GI      Physical Exam at Discharge:    BP (!) 177/82   Pulse 86   Temp 98.2 °F (36.8 °C) (Temporal)   Resp 20   Ht 5' 6\" (1.676 m)   Wt 237 lb 3.2 oz (107.6 kg)   SpO2 96%   BMI 38.29 kg/m²   Gen: No distress. Alert. Eyes: Left pupil is opaque. No sclera icterus. No conjunctival injection. ENT: No discharge. Pharynx clear. Neck: Trachea midline. Normal thyroid. Resp: No accessory muscle use. No crackles. No wheezes. No rhonchi. No dullness on percussion. CV: Regular rate. Regular rhythm. No murmur or rub. No edema. GI: Non-tender. Non-distended. No masses. No organomegaly. Normal bowel sounds. No hernia. PEG present. Skin: Warm and dry. No nodule on exposed extremities. No rash on exposed extremities. Lymph: No cervical LAD. No supraclavicular LAD. M/S: No cyanosis. No joint deformity. No clubbing. Neuro: Awake. Reflexes 2+ symmetric bilaterally. Moves all 4 extremities, non focal  Psych: Oriented x 3. No anxiety or agitation. Lab Results   Component Value Date    WBC 8.3 11/08/2022    HGB 8.2 (L) 11/08/2022    HCT 25.7 (L) 11/08/2022    MCV 89.9 11/08/2022     11/08/2022     Lab Results   Component Value Date     11/08/2022    K 4.9 11/08/2022     11/08/2022    CO2 24 11/08/2022    BUN 20 11/08/2022    CREATININE 1.1 11/08/2022    GLUCOSE 101 (H) 11/08/2022    CALCIUM 8.3 11/08/2022    PROT 7.6 11/07/2022    LABALBU 3.7 11/07/2022    BILITOT 0.3 11/07/2022    ALKPHOS 134 (H) 11/07/2022    AST 14 (L) 11/07/2022    ALT 6 (L) 11/07/2022    LABGLOM 51 (A) 11/08/2022    GFRAA 17 (A) 07/19/2022    AGRATIO 0.9 (L) 11/07/2022    GLOB 3.3 10/07/2021       RADIOLOGY  CT ABDOMEN PELVIS WO CONTRAST Additional Contrast? None   Final Result   1. No acute findings in the abdomen or pelvis. 2. Percutaneous gastrostomy tube with the balloon at the gastric antrum.    3. New airspace opacity at the medial aspect of the right lower lobe that   could reflect atelectasis or pneumonia. XR ABDOMEN (KUB) (SINGLE AP VIEW)   Final Result   Normally located percutaneous gastrostomy tube. No evidence of bowel obstruction. Discharge Medications     Medication List        CHANGE how you take these medications      ferrous sulfate 325 (65 Fe) MG tablet  Commonly known as: IRON 325  What changed: Another medication with the same name was removed. Continue taking this medication, and follow the directions you see here. CONTINUE taking these medications      acetaminophen 325 MG tablet  Commonly known as: TYLENOL     acetylcysteine 20 % nebulizer solution  Commonly known as: MUCOMYST  Inhale 3 mLs into the lungs 2 times daily     amLODIPine 5 MG tablet  Commonly known as: NORVASC     apixaban 2.5 MG Tabs tablet  Commonly known as: Eliquis  Take 1 tablet by mouth 2 times daily     atorvastatin 40 MG tablet  Commonly known as: LIPITOR  TAKE 1 AND 1/2 TABLETS EVERY DAY     budesonide 0.25 MG/2ML nebulizer suspension  Commonly known as: PULMICORT     carbidopa-levodopa  MG per tablet  Commonly known as: SINEMET     carvedilol 12.5 MG tablet  Commonly known as: COREG     cloNIDine 0.1 MG tablet  Commonly known as: CATAPRES     escitalopram 10 MG tablet  Commonly known as: LEXAPRO     formoterol 20 MCG/2ML nebulizer solution  Commonly known as: PERFOROMIST     furosemide 40 MG tablet  Commonly known as: LASIX  Take 1 tablet by mouth in the morning.      guaiFENesin 100 MG/5ML Soln oral solution  Commonly known as: ROBITUSSIN     Handicap Placard Misc  by Does not apply route DX: G20  parkinson's disease  Exp: 9/1/2024     lamoTRIgine 25 MG tablet  Commonly known as: LAMICTAL     Lift Chair Misc  by Does not apply route     montelukast 10 MG tablet  Commonly known as: SINGULAIR  TAKE 1 TABLET EVERY DAY     propafenone 150 MG tablet  Commonly known as: RYTHMOL  TAKE 1 TABLET BY MOUTH EVERY 8 HOURS     scopolamine transdermal patch  Commonly known as: TRANSDERM-SCOP     senna-docusate 8.6-50 MG per tablet  Commonly known as: PERICOLACE     simethicone 40 MG/0.6ML Liqd drops  Commonly known as: MYLICON     therapeutic multivitamin-minerals tablet            STOP taking these medications      NIFEdipine 90 MG extended release tablet  Commonly known as: PROCARDIA XL     valsartan 80 MG tablet  Commonly known as: DIOVAN                Discharged in stable condition back to SNF     Follow Up:   Follow up with physician at [facility]      ***

## 2022-11-08 NOTE — ANESTHESIA PRE PROCEDURE
Department of Anesthesiology  Preprocedure Note       Name:  Humberto Mendez   Age:  [de-identified] y.o.  :  1940                                          MRN:  [id number]         Date:  2022      Surgeon: Audelia Bowser):  Eric Malloy DO    Procedure: Procedure(s):  EGD/PEG REPLACEMENT    Medications prior to admission:   Prior to Admission medications    Medication Sig Start Date End Date Taking?  Authorizing Provider   acetaminophen (TYLENOL) 325 MG tablet Take 650 mg by mouth every 6 hours as needed for Pain   Yes Historical Provider, MD   amLODIPine (NORVASC) 5 MG tablet Take 5 mg by mouth daily   Yes Historical Provider, MD   budesonide (PULMICORT) 0.25 MG/2ML nebulizer suspension Take 1 ampule by nebulization 2 times daily   Yes Historical Provider, MD   carvedilol (COREG) 12.5 MG tablet Take 12.5 mg by mouth 2 times daily (with meals)   Yes Historical Provider, MD   cloNIDine (CATAPRES) 0.1 MG tablet Take 0.1 mg by mouth 2 times daily   Yes Historical Provider, MD   formoterol (PERFOROMIST) 20 MCG/2ML nebulizer solution Take 20 mcg by nebulization every 12 hours   Yes Historical Provider, MD   guaiFENesin (ROBITUSSIN) 100 MG/5ML SOLN oral solution Take 200 mg by mouth every 6 hours as needed for Cough   Yes Historical Provider, MD   ferrous sulfate (IRON 325) 325 (65 Fe) MG tablet Take 325 mg by mouth daily (with breakfast)   Yes Historical Provider, MD   escitalopram (LEXAPRO) 10 MG tablet Take 10 mg by mouth daily   Yes Historical Provider, MD   scopolamine (TRANSDERM-SCOP) transdermal patch Place 1 patch onto the skin every 72 hours   Yes Historical Provider, MD   senna-docusate (PERICOLACE) 8.6-50 MG per tablet Take 1 tablet by mouth in the morning and at bedtime   Yes Historical Provider, MD   simethicone (MYLICON) 40 FJ/7.3XI LIQD drops Take 40 mg by mouth in the morning, at noon, and at bedtime   Yes Historical Provider, MD   Lift Chair [id number] [facility] by Does not apply route 8/15/22   Marjan Morales APRN - CNP   furosemide (LASIX) 40 MG tablet Take 1 tablet by mouth in the morning. 7/19/22   Yi Vazquez MD   acetylcysteine (MUCOMYST) 20 % nebulizer solution Inhale 3 mLs into the lungs 2 times daily 7/19/22   Yi Vazquez MD   montelukast (SINGULAIR) 10 MG tablet TAKE 1 TABLET EVERY DAY 5/26/22   Freddy Morales APRN - CNP   apixaban (ELIQUIS) 2.5 MG TABS tablet Take 1 tablet by mouth 2 times daily 5/24/22   Ayana Villa MD   valsartan (DIOVAN) 80 MG tablet Take 1 tablet by mouth daily 5/24/22 7/19/22  Ayana Villa MD   NIFEdipine (PROCARDIA XL) 90 MG extended release tablet TAKE 1 TABLET EVERY DAY 3/2/22 7/19/22  Jazz Carl APRN - CNP   atorvastatin (LIPITOR) 40 MG tablet TAKE 1 AND 1/2 TABLETS EVERY DAY 11/4/21   Ayana Villa MD   propafenone (RYTHMOL) 150 MG tablet TAKE 1 TABLET BY MOUTH EVERY 8 HOURS 11/3/21   Summer Oglesby MD   carbidopa-levodopa (SINEMET)  MG per tablet Take 1 tablet by mouth 4 times daily    Historical Provider, MD   lamoTRIgine (LAMICTAL) 25 MG tablet Take 50 mg by mouth 2 times daily     Historical Provider, MD   Handicap Placard MISC by Does not apply route DX: Jillian Landeros  parkinson's disease  Exp: 9/1/2024 9/27/19   BIJAN Mcqueen   Multiple Vitamins-Minerals (THERAPEUTIC MULTIVITAMIN-MINERALS) tablet Take 1 tablet by mouth daily    Historical Provider, MD   solifenacin (VESICARE) 5 MG tablet Take 1 tablet by mouth daily.  6/25/13 10/29/13  Shayne Ba MD       Current medications:    Current Facility-Administered Medications   Medication Dose Route Frequency Provider Last Rate Last Admin    ipratropium-albuterol (DUONEB) nebulizer solution 1 ampule  1 ampule Inhalation Q4H PRN Yi Vazquez MD   1 ampule at 11/08/22 0728    diatrizoate meglumine-sodium (GASTROGRAFIN) 66-10 % solution 12 mL  12 mL Oral ONCE PRN Yi Vazquez MD        therapeutic multivitamin-minerals 1 tablet  1 tablet Oral Daily Bailey Uribe MD        carbidopa-levodopa (SINEMET)  MG per tablet 1 tablet  1 tablet Oral 4x Daily Baiely Uribe MD        lamoTRIgine (LAMICTAL) tablet 50 mg  50 mg Oral BID Bailey Uribe MD        atorvastatin (LIPITOR) tablet 10 mg  10 mg Oral Daily Bailey Uribe MD        propafenone (RYTHMOL) tablet 150 mg  150 mg Oral 3 times per day Bailey Uribe MD        apixaban (ELIQUIS) tablet 2.5 mg  2.5 mg Oral BID Bailey Uribe MD        montelukast (SINGULAIR) tablet 10 mg  10 mg Oral Nightly Bailey Uribe MD        furosemide (LASIX) tablet 40 mg  40 mg Oral Daily Bailey Uribe MD        acetylcysteine (MUCOMYST) 20 % solution 600 mg  600 mg Inhalation BID Bailey Uribe MD   600 mg at 11/08/22 0728    amLODIPine (NORVASC) tablet 5 mg  5 mg Oral Daily Bailey Uribe MD        budesonide (PULMICORT) nebulizer suspension 250 mcg  250 mcg Nebulization BID Bailey Uribe MD   250 mcg at 11/08/22 0728    carvedilol (COREG) tablet 12.5 mg  12.5 mg Oral BID WC Bailey Uribe MD        cloNIDine (CATAPRES) tablet 0.1 mg  0.1 mg Oral BID Bailey Uribe MD        guaiFENesin (ROBITUSSIN) 100 MG/5ML oral solution 200 mg  200 mg Oral Q6H PRN Bailey Uribe MD        ferrous sulfate (IRON 325) tablet 325 mg  325 mg Oral Daily with breakfast Bailey Uribe MD        escitalopram (LEXAPRO) tablet 10 mg  10 mg Oral Daily Bailey Uribe MD        scopolamine (TRANSDERM-SCOP) transdermal patch 1 patch  1 patch TransDERmal Q72H Bailey rUibe MD        sennosides-docusate sodium (SENOKOT-S) 8.6-50 MG tablet 1 tablet  1 tablet Oral BID Bailey Uribe MD        simethicone (MYLICON) chewable tablet 40 mg  40 mg Oral TID Bailey Uribe MD        olodaterol (STRIVERDI RESPIMAT) inhaler 2 puff  2 puff Inhalation Daily Sweta Lawanda Tyler MD   2 puff at 11/08/22 0729    sodium chloride flush 0.9 % injection 5-40 mL  5-40 mL IntraVENous 2 times per day Sherrie Shrestha MD   10 mL at 11/07/22 2206    sodium chloride flush 0.9 % injection 5-40 mL  5-40 mL IntraVENous PRN Sherrie Shrestha MD        0.9 % sodium chloride infusion   IntraVENous PRN Sherrie Shrestha MD        ondansetron (ZOFRAN-ODT) disintegrating tablet 4 mg  4 mg Oral Q8H PRN Sherrie Shrestha MD        Or    ondansetron (ZOFRAN) injection 4 mg  4 mg IntraVENous Q6H PRN Sherrie Shrestha MD        polyethylene glycol (GLYCOLAX) packet 17 g  17 g Oral Daily PRN Sherrie Shrestha MD        acetaminophen (TYLENOL) tablet 650 mg  650 mg Oral Q6H PRN Sherrie Shrestha MD        Or    acetaminophen (TYLENOL) suppository 650 mg  650 mg Rectal Q6H PRANDREY Shrestha MD        0.9 % sodium chloride infusion   IntraVENous Continuous Sherrie Shrestha MD 75 mL/hr at 11/07/22 2206 New Bag at 11/07/22 2206       Allergies:     Allergies   Allergen Reactions    Levaquin [Levofloxacin]      Pt was given in er and developed hives and redness    Metoclopramide     Phenazopyridine     Pyridium [Phenazopyridine Hcl]     Reglan [Metoclopramide Hcl]     Reglan [Metoclopramide Hcl]      tremors       Problem List:    Patient Active Problem List   Diagnosis Code    GERD (gastroesophageal reflux disease) K21.9    Essential hypertension I10    Hyperlipidemia E78.5    Restless leg syndrome G25.81    Anemia of chronic disease D63.8    Postural dizziness R42    Moderate persistent asthma J45.40    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-sided sciatica M54.41, G89.29    Morbid obesity with BMI of 40.0-44.9, adult ([facility]) E66.01, Z68.41    Type 2 diabetes mellitus with stage 4 chronic kidney disease, without long-term current use of insulin ([facility]) E11.22, N18.4    CAD (coronary artery disease) I25.10    Parkinson disease ([address] G20    Severe obstructive sleep apnea G47.33    Hypothyroidism E03.9    Hepatic steatosis K76.0    Diverticulosis K57.90    Blind left eye H54.40    L carotid artery stenosis s/p CEA I65.29    Septicemia ([facility]) A41.9    PAF (paroxysmal atrial fibrillation) ([facility]) I48.0    COPD (chronic obstructive pulmonary disease) ([facility]) J44.9    Acute respiratory failure with hypoxia ([facility]) D79.37    Diastolic congestive heart failure ([facility]) I50.30    Partial symptomatic epilepsy with complex partial seizures, not intractable, without status epilepticus ([facility]) G40.209    Chronic kidney disease N18.9    Acute kidney injury superimposed on CKD ([facility]) N17.9, N18.9    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    Sialadenitis K11.20    Chest pain R07.9    Pulmonary HTN ([facility]) I27.20    Mitral valve insufficiency I34.0    C. difficile colitis A04.72    Laceration of left lower extremity H88.945J    Pulmonary nodules R91.8    Sensorineural hearing loss, bilateral H90.3    Aspiration pneumonia of left lower lobe ([facility]) J69.0    Severe protein-calorie malnutrition ([facility]) E43    Chronic anemia D64.9    Thrush B37.0    MRSA colonization Z22.322    BETSY (obstructive sleep apnea) P86.28    Metabolic acidosis B16.63    Pulmonary congestion R09.89    Mucus plugging of bronchi T17.500A    Atrial fibrillation, chronic ([facility]) I48.20    Mild protein-calorie malnutrition ([facility]) E44.1    Ventilator dependent ([facility] Z99.11    Gastrostomy tube obstruction ([facility]) K94.23       Past Medical History:        Diagnosis Date    Anemia, unspecified 2010    neg bone marrow    Asthma     Atrial fibrillation ([facility]     Baker's cyst     Basal cell carcinoma of left cheek 3/12/2019    Blind left eye     Carotid artery stenoses     Carotid stenosis 2002    left    Cervical spinal cord compression ([address] 11/2010    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-sided sciatica 8/9/2016    CKD (chronic kidney disease) stage 3, GFR 30-59 ml/min [facility])     Community acquired pneumonia of left lower lobe of lung 9/8/2018    CRI     Detached retina, left     Diverticulosis     DJD (degenerative joint disease)     Edema     chronic    Fatty liver     GERD (gastroesophageal reflux disease)     Hearing loss     Herniated lumbar intervertebral disc     Hx stage 2 sacral decubitus ulcer 4/13/2018    Hyperlipidemia     Hypertension     Hypothyroid 1/27/2015    Morbid obesity with BMI of 40.0-44.9, adult ([address] 2/10/2017    BETSY treated with BiPAP     Parkinson's disease ([facility]     Partial symptomatic epilepsy with complex partial seizures, not intractable, without status epilepticus ([address] 1/24/2020    Restless leg syndrome     Restless legs syndrome     Rheumatic fever 1950    2x IN [place]    Sleep apnea     Tremor, essential 8/16/2010    Type 2 diabetes mellitus without complication ([facility]     Type II or unspecified type diabetes mellitus without mention of complication, not stated as uncontrolled        Past Surgical History:        Procedure Laterality Date    APPENDECTOMY      BRONCHOSCOPY N/A 7/7/2022    BRONCHOSCOPY ALVEOLAR LAVAGE performed by Anastacia Massey MD at [facility]  7/7/2022    BRONCHOSCOPY THERAPUTIC ASPIRATION INITIAL performed by Anastacia Massey MD at [address]      left    CATARACT REMOVAL      CERVICAL FUSION  11/2010    CHOLECYSTECTOMY      COLONOSCOPY N/A 3/8/2020    COLONOSCOPY FLEXIBLE W/ DECOMPRESSION performed by Sonia Castillo MD at [address] (CERVIX STATUS UNKNOWN)      IR MIDLINE CATH  10/8/2021    IR MIDLINE CATH 10/8/2021 [facility]ED[facility]    JOINT REPLACEMENT Bilateral 2003    KNEE SURGERY      replacement x 2    OVARY REMOVAL      SHOULDER SURGERY      TONSILLECTOMY      TRACHEOSTOMY N/A 7/15/2022    TRACHEOTOMY WITH PERCUTANEOUS ENDOSCOPIC GASTROSTOMY TUBE PLACEMENT performed by Pinky Kim MD at [address] ENDOSCOPY  03/17/2020    Esophagitis    UPPER GASTROINTESTINAL ENDOSCOPY N/A 3/17/2020    EGD performed by Bravo Ronquillo MD at [facility]         Social History:    Social History     Tobacco Use    Smoking status: Former    Smokeless tobacco: Never   Substance Use Topics    Alcohol use: No                                Counseling given: Not Answered      Vital Signs (Current):   Vitals:    11/08/22 0739 11/08/22 0758 11/08/22 1232 11/08/22 1302   BP:  (!) 150/63 (!) 192/66    Pulse: 85 85 89    Resp: 20 16 16    Temp:  97.5 °F (36.4 °C) 97.9 °F (36.6 °C)    TempSrc:  Oral Infrared    SpO2: 95% 100% 93%    Weight:       Height:    5' 6\" (1.676 m)                                              BP Readings from Last 3 Encounters:   11/08/22 (!) 192/66   10/27/22 (!) 150/70   07/19/22 (!) 104/37       NPO Status: Time of last liquid consumption: 0000                        Time of last solid consumption: 0000                        Date of last liquid consumption: 11/08/22                        Date of last solid food consumption: 11/08/22    BMI:   Wt Readings from Last 3 Encounters:   11/07/22 237 lb 3.2 oz (107.6 kg)   10/27/22 240 lb (108.9 kg)   07/16/22 253 lb 8.5 oz (115 kg)     Body mass index is 38.29 kg/m².     CBC:   Lab Results   Component Value Date/Time    WBC 8.3 11/08/2022 05:26 AM    RBC 2.86 11/08/2022 05:26 AM    HGB 8.2 11/08/2022 05:26 AM    HCT 25.7 11/08/2022 05:26 AM    MCV 89.9 11/08/2022 05:26 AM    RDW 13.2 11/08/2022 05:26 AM     11/08/2022 05:26 AM       CMP:   Lab Results   Component Value Date/Time     11/08/2022 05:26 AM    K 4.9 11/08/2022 05:26 AM     11/08/2022 05:26 AM    CO2 24 11/08/2022 05:26 AM    BUN 20 11/08/2022 05:26 AM    CREATININE 1.1 11/08/2022 05:26 AM    GFRAA 17 07/19/2022 04:28 AM    GFRAA >60 02/21/2013 06:37 AM    AGRATIO 0.9 11/07/2022 02:16 PM    LABGLOM 51 11/08/2022 05:26 AM    GLUCOSE 101 11/08/2022 05:26 AM    PROT 7.6 11/07/2022 02:16 PM    PROT 6.5 02/20/2013 12:40 PM    CALCIUM 8.3 11/08/2022 05:26 AM    BILITOT 0.3 11/07/2022 02:16 PM    ALKPHOS 134 11/07/2022 02:16 PM    AST 14 11/07/2022 02:16 PM    ALT 6 11/07/2022 02:16 PM       POC Tests:   Recent Labs     11/08/22  0657   POCGLU 148*       Coags:   Lab Results   Component Value Date/Time    PROTIME 19.0 06/19/2022 10:04 AM    INR 1.61 06/19/2022 10:04 AM    APTT 19.5 04/09/2021 12:05 PM       HCG (If Applicable): No results found for: PREGTESTUR, PREGSERUM, HCG, HCGQUANT     ABGs:   Lab Results   Component Value Date/Time    PHART 7.525 07/15/2022 04:30 AM    PO2ART 85.1 07/15/2022 04:30 AM    RWH4RYG 37.8 07/15/2022 04:30 AM    NDQ4RGV 30.5 07/15/2022 04:30 AM    BEART 7.2 07/15/2022 04:30 AM    G4VISFLL 97.3 07/15/2022 04:30 AM        Type & Screen (If Applicable):  No results found for: LABABO, LABRH    Drug/Infectious Status (If Applicable):  No results found for: HIV, HEPCAB    COVID-19 Screening (If Applicable):   Lab Results   Component Value Date/Time    COVID19 NOT DETECTED 11/07/2022 02:43 PM           Anesthesia Evaluation  Patient summary reviewed and Nursing notes reviewed no history of anesthetic complications:   Airway: Mallampati: III     Neck ROM: full     Dental:          Pulmonary:Negative Pulmonary ROS and normal exam    (+) pneumonia:  COPD:  sleep apnea:  asthma:                            Cardiovascular:Negative CV ROS    (+) hypertension:, CAD:, dysrhythmias: atrial fibrillation, CHF:, pulmonary hypertension:,     (-)  angina                Neuro/Psych:   Negative Neuro/Psych ROS  (+) neuromuscular disease (sciatica): Parkinson's disease,             GI/Hepatic/Renal: Neg GI/Hepatic/Renal ROS  (+) GERD:, liver disease:, renal disease: CRI,      (-) hiatal hernia       Endo/Other: Negative Endo/Other ROS   (+) Diabetes, hypothyroidism: arthritis:., .                 Abdominal:             Vascular:   + PVD, aortic or cerebral, . Other Findings:           Anesthesia Plan      general     ASA 3     (I discussed with the patient the risks and benefits of PIV, general anesthesia, IV Narcotics, PACU. All questions were answered the patient agrees with the plan and wishes to proceed.  )  Induction: intravenous. Pre-Operative Diagnosis: PEG tube malfunction ([facility] [K94.23]; Gastrojejunostomy tube dislodgement [T85.528A]; Chronic kidney disease, unspecified CKD stage [N18.9]    [de-identified] y.o.   BMI:  Body mass index is 38.29 kg/m².      Vitals:    11/08/22 0739 11/08/22 0758 11/08/22 1232 11/08/22 1302   BP:  (!) 150/63 (!) 192/66    Pulse: 85 85 89    Resp: 20 16 16    Temp:  97.5 °F (36.4 °C) 97.9 °F (36.6 °C)    TempSrc:  Oral Infrared    SpO2: 95% 100% 93%    Weight:       Height:    5' 6\" (1.676 m)       Allergies   Allergen Reactions    Levaquin [Levofloxacin]      Pt was given in er and developed hives and redness    Metoclopramide     Phenazopyridine     Pyridium [Phenazopyridine Hcl]     Reglan [Metoclopramide Hcl]     Reglan [Metoclopramide Hcl]      tremors       Social History     Tobacco Use    Smoking status: Former    Smokeless tobacco: Never   Substance Use Topics    Alcohol use: No       LABS:    CBC  Lab Results   Component Value Date/Time    WBC 8.3 11/08/2022 05:26 AM    HGB 8.2 (L) 11/08/2022 05:26 AM    HCT 25.7 (L) 11/08/2022 05:26 AM     11/08/2022 05:26 AM     RENAL  Lab Results   Component Value Date/Time     11/08/2022 05:26 AM    K 4.9 11/08/2022 05:26 AM     11/08/2022 05:26 AM    CO2 24 11/08/2022 05:26 AM    BUN 20 11/08/2022 05:26 AM    CREATININE 1.1 11/08/2022 05:26 AM    GLUCOSE 101 (H) 11/08/2022 05:26 AM     COAGS  Lab Results   Component Value Date/Time    PROTIME 19.0 (H) 06/19/2022 10:04 AM    INR 1.61 (H) 06/19/2022 10:04 AM    APTT 19.5 (L) 04/09/2021 12:05 PM         Yudith Lewis MD   11/8/2022

## 2022-11-08 NOTE — H&P
Combined History and Physical and Discharge Summary    Chief Complaint   Patient presents with    Abdominal Pain     Pt comes from nursing home due to issue with PEG tube. Pt complaining of pain and distension. Nursing home reports problems with clogging and talked about replacing it. GI wanted to send Pt to ER        HISTORY OF PRESENT ILLNESS:     The patient is a 72-year-old white female with a past medical history of atrial fibrillation, Parkinson's disease, CKD, obstructive sleep apnea who spent 34 days in the hospital from June through July 2022. At that time she is admitted for acute respiratory failure, aspiration pneumonia, left pleural effusion, sepsis, dysphagia status post PEG tube placement, GI bleed, FARHEEN on CKD and hypertension. Patient is discharged to nursing home. She was on a vent at time of discharge but has since been liberated from the vent. She presented to the nursing home yesterday because of issues with her G-tube. The G-tube has been clogged. In the ER her G-tube was unclogged. GI was consulted. Plans was to replace the G-tube and she was admitted to hospital for observation. According to the  the patient has been seen by speech therapy at the nursing home. She has been tolerating applesauce but has still not eaten solid food      Patient is allergic to levaquin [levofloxacin], metoclopramide, phenazopyridine, pyridium [phenazopyridine hcl], reglan [metoclopramide hcl], and reglan [metoclopramide hcl].     Past Medical History:   Diagnosis Date    Anemia, unspecified 2010    neg bone marrow    Asthma     Atrial fibrillation ([address]     Baker's cyst     Basal cell carcinoma of left cheek 3/12/2019    Blind left eye     Carotid artery stenoses     Carotid stenosis 2002    left    Cervical spinal cord compression ([address] 11/2010    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-sided sciatica 8/9/2016    CKD (chronic kidney disease) stage 3, GFR 30-59 ml/min ([address]     [facility] acquired pneumonia of left lower lobe of lung 9/8/2018    CRI     Detached retina, left     Diverticulosis     DJD (degenerative joint disease)     Edema     chronic    Fatty liver     GERD (gastroesophageal reflux disease)     Hearing loss     Herniated lumbar intervertebral disc     Hx stage 2 sacral decubitus ulcer 4/13/2018    Hyperlipidemia     Hypertension     Hypothyroid 1/27/2015    Morbid obesity with BMI of 40.0-44.9, adult ([address] 2/10/2017    BETSY treated with BiPAP     Parkinson's disease ([address]     Partial symptomatic epilepsy with complex partial seizures, not intractable, without status epilepticus ([address] 1/24/2020    Restless leg syndrome     Restless legs syndrome     Rheumatic fever 1950    2x IN [facility] [place]    Sleep apnea     Tremor, essential 8/16/2010    Type 2 diabetes mellitus without complication ([address]     Type II or unspecified type diabetes mellitus without mention of complication, not stated as uncontrolled        Past Surgical History:   Procedure Laterality Date    APPENDECTOMY      BRONCHOSCOPY N/A 7/7/2022    BRONCHOSCOPY ALVEOLAR LAVAGE performed by Orlin Monson MD at [place] [address]  7/7/2022    BRONCHOSCOPY [address] [facility] performed by Orlin Monson MD at [address] [place] [address]      left    CATARACT REMOVAL      CERVICAL FUSION  11/2010    CHOLECYSTECTOMY      COLONOSCOPY N/A 3/8/2020    COLONOSCOPY FLEXIBLE W/ DECOMPRESSION performed by Deangelo Robbins MD at [address] [facility] (CERVIX STATUS UNKNOWN)      IR MIDLINE CATH  10/8/2021    IR MIDLINE CATH 10/8/2021 [facility] SPECIAL PROCEDURES    JOINT REPLACEMENT Bilateral 2003    KNEE SURGERY      replacement x 2    OVARY REMOVAL      SHOULDER SURGERY      TONSILLECTOMY      TRACHEOSTOMY N/A 7/15/2022    TRACHEOTOMY WITH PERCUTANEOUS ENDOSCOPIC GASTROSTOMY TUBE PLACEMENT performed by Jethro Hawk MD at [facility] OR    TUBAL LIGATION      UPPER GASTROINTESTINAL ENDOSCOPY  03/17/2020    Esophagitis    UPPER GASTROINTESTINAL ENDOSCOPY N/A 3/17/2020    EGD performed by Yanira Chen MD at [address]    VITRECTOMY         Scheduled Meds:   therapeutic multivitamin-minerals  1 tablet Oral Daily    carbidopa-levodopa  1 tablet Oral 4x Daily    lamoTRIgine  50 mg Oral BID    atorvastatin  10 mg Oral Daily    propafenone  150 mg Oral 3 times per day    apixaban  2.5 mg Oral BID    montelukast  10 mg Oral Nightly    furosemide  40 mg Oral Daily    acetylcysteine  600 mg Inhalation BID    amLODIPine  5 mg Oral Daily    budesonide  250 mcg Nebulization BID    carvedilol  12.5 mg Oral BID WC    cloNIDine  0.1 mg Oral BID    ferrous sulfate  325 mg Oral Daily with breakfast    escitalopram  10 mg Oral Daily    scopolamine  1 patch TransDERmal Q72H    sennosides-docusate sodium  1 tablet Oral BID    simethicone  40 mg Oral TID    olodaterol  2 puff Inhalation Daily    sodium chloride flush  5-40 mL IntraVENous 2 times per day       Continuous Infusions:   sodium chloride      sodium chloride 75 mL/hr at 11/07/22 2206       PRN Meds:  ipratropium-albuterol, diatrizoate meglumine-sodium, guaiFENesin, sodium chloride flush, sodium chloride, ondansetron **OR** ondansetron, polyethylene glycol, acetaminophen **OR** acetaminophen       reports that she has quit smoking. She has never used smokeless tobacco.    Family History   Problem Relation Age of Onset    Diabetes Mother     Heart Disease Mother     Diabetes Father     Heart Disease Father     Diabetes Sister        Social History     Socioeconomic History    Marital status:      Spouse name: Jie Oreilly    Number of children: 2    Years of education: 12    Highest education level: None   Tobacco Use    Smoking status: Former    Smokeless tobacco: Never   Vaping Use    Vaping Use: Never used   Substance and Sexual Activity    Alcohol use: No    Drug use:  No Sexual activity: Not Currently   Social History Narrative    ** Merged History Encounter **          REVIEW OF SYSTEMS:   Constitutional: Negative for fever   HENT: Negative for sore throat   Eyes: Negative for redness   Respiratory: Negative for dyspnea, cough   Cardiovascular: Negative for chest pain   Gastrointestinal: see HPI  Genitourinary: Negative for hematuria   Musculoskeletal: Negative for arthralgias   Skin: Negative for rash   Neurological: Negative for syncope   Hematological: Negative for adenopathy   Psychiatric/Behavorial: Negative for anxiety    VS:   BP (!) 150/63   Pulse 85   Temp 97.5 °F (36.4 °C) (Oral)   Resp 16   Ht 5' 6\" (1.676 m)   Wt 237 lb 3.2 oz (107.6 kg)   SpO2 100%   BMI 38.29 kg/m²     Gen: No distress. Alert. Eyes: Left pupil is opaque. No sclera icterus. No conjunctival injection. ENT: No discharge. Pharynx clear. Neck: Trachea midline. Normal thyroid. Resp: No accessory muscle use. No crackles. No wheezes. No rhonchi. No dullness on percussion. CV: Regular rate. Regular rhythm. No murmur or rub. No edema. GI: Non-tender. Non-distended. No masses. No organomegaly. Normal bowel sounds. No hernia. PEG present. Skin: Warm and dry. No nodule on exposed extremities. No rash on exposed extremities. Lymph: No cervical LAD. No supraclavicular LAD. M/S: No cyanosis. No joint deformity. No clubbing. Neuro: Awake. Reflexes 2+ symmetric bilaterally. Moves all 4 extremities, non focal  Psych: Oriented x 3. No anxiety or agitation. CBC:   Recent Labs     11/07/22  1416 11/08/22  0526   WBC 11.5* 8.3   HGB 9.8* 8.2*   HCT 30.3* 25.7*   MCV 87.6 89.9    264     BMP:   Recent Labs     11/07/22  1416      K 5.2*      CO2 29   BUN 25*   CREATININE 1.3*     LIVER PROFILE:   Recent Labs     11/07/22  1416   AST 14*   ALT 6*   LIPASE 32.0   BILITOT 0.3   ALKPHOS 134*     PT/INR: No results for input(s): PROTIME, INR in the last 72 hours.   APTT: No results for input(s): APTT in the last 72 hours. UA:  Recent Labs     11/07/22  1645   COLORU Yellow   PHUR 8.0   WBCUA 3-5   RBCUA 3-4   MUCUS Rare*   BACTERIA 1+*   CLARITYU Clear   SPECGRAV 1.015   LEUKOCYTESUR Negative   UROBILINOGEN 0.2   BILIRUBINUR Negative   BLOODU Negative   GLUCOSEU Negative       CT ABDOMEN PELVIS WO CONTRAST Additional Contrast? None   Final Result   1. No acute findings in the abdomen or pelvis. 2. Percutaneous gastrostomy tube with the balloon at the gastric antrum. 3. New airspace opacity at the medial aspect of the right lower lobe that   could reflect atelectasis or pneumonia. XR ABDOMEN (KUB) (SINGLE AP VIEW)   Final Result   Normally located percutaneous gastrostomy tube. No evidence of bowel obstruction. ASSESSMENT:      Principal Problem:    Gastrostomy tube obstruction (HCC)  Active Problems:    Atrial fibrillation, chronic (HCC)    GERD (gastroesophageal reflux disease)    Essential hypertension    Hyperlipidemia    Moderate persistent asthma    Morbid obesity with BMI of 40.0-44.9, adult (HCC)    CAD (coronary artery disease)    Hypothyroidism    Blind left eye  Resolved Problems:    * No resolved hospital problems. *      PLAN:  #G tube malfunction   -hx of dysphagia   -CT abdomen neg for acute pathology   -NPO   -GI consulted planning for EGD with exchange today     #Atrial fibrillation  -on eliquis for AC   -on rythmol     #CKD Stage III   -kidney function stable   -continue to monitor Cr    #Elevated troponin   -0.02 --> <0.01  -no CP   -EKG with acute ischemic changes     #FÁTIMA   -on ferrous sulfate   -continue to monitor Hgb     #Hx of seizures   -on lamictal     #Parkinsons disease   -on sinemet     #HTN   -on norvasc, coreg, clonidine, lasix     #HLD   -on statin     #Carotid artery stenosis   -s/p CEA     #Depression   -on lexapro       Possible discharge after G tube replacement. EGD done. Discharge to NH in a stable condition. Mac Don MD 11/8/2022 8:26 AM

## 2022-11-08 NOTE — PROGRESS NOTES
Patient admitted to room 227. Oriented to room and call light. Bed wheels locked. Call light within reach and use of call light explained to patient. No other needs identified at this time. Will continue to monitor. 4 Eyes Skin Assessment     The patient is being assess for   Admission    I agree that 2 RN's have performed a thorough Head to Toe Skin Assessment on the patient. ALL assessment sites listed below have been assessed. Areas assessed for pressure by both nurses:   [x]   Head, Face, and Ears   [x]   Shoulders, Back, and Chest, Abdomen  [x]   Arms, Elbows, and Hands   []   Coccyx, Sacrum, and Ischium  [x]   Legs, Feet, and Heels  Scattered bruises and slight redness to coccyx, blanchable      Skin Assessed Under all Medical Devices by both nurses:  N/a               All Mepilex Borders were peeled back and area peeked at by both nurses:  No: n/a  Please list where Mepilex Borders are located:  n/a             **SHARE this note so that the co-signing nurse is able to place an eSignature**    Co-signer eSignature: {Esignature:[id number]}    Does the Patient have Skin Breakdown related to pressure? No     (Insert Photo here)         Vineet Prevention initiated:  No   Wound Care Orders initiated:  No      [facility] nurse consulted for Pressure Injury (Stage 3,4, Unstageable, DTI, NWPT, Complex wounds)and New or Established Ostomies:  No      Primary Nurse eSignature: Electronically signed by Maude Campbell RN on 11/8/22 at 1:56 AM EST          Bedside Mobility Assessment Tool (BMAT):     Assessment Level 1- Sit and Shake    1. From a semi-reclined position, ask patient to sit up and rotate to a seated position at the side of the bed. Can use the bedrail. 2. Ask patient to reach out and grab your hand and shake making sure patient reaches across his/her midline. Pass- Patient is able to come to a seated position, maintain core strength. Maintains seated balance while reaching across midline.  Move on to Assessment Level 2. Assessment Level 2- Stretch and Point   1. With patient in seated position at the side of the bed, have patient place both feet on the floor (or stool) with knees no higher than hips. 2. Ask patient to stretch one leg and straighten the knee, then bend the ankle/flex and point the toes. If appropriate, repeat with the other leg. Pass- Patient is able to demonstrate appropriate quad strength on intended weight bearing limb(s). Move onto Assessment Level 3. Assessment Level 3- Stand   1. Ask patient to elevate off the bed or chair (seated to standing) using an assistive device (cane, bedrail). 2. Patient should be able to raise buttocks off be and hold for a count of five. May repeat once. Fail- Patient unable to demonstrate standing stability. Patient is MOBILITY LEVEL 3. Assessment Level 4- Walk   1. Ask patient to march in place at bedside. 2. Then ask patient to advance step and return each foot. Some medical conditions may render a patient from stepping backwards, use your best clinical judgement. Fail- Patient not able to complete tasks OR requires use of assistive device. Patient is MOBILITY LEVEL 3. Mobility Level- 4      Patient is not able to demonstrated the ability to move from a reclining position to an upright position within the recliner.  however patient is alert, oriented and able to provide informed consent

## 2022-11-08 NOTE — DISCHARGE INSTR - COC
Continuity of Care Form    Patient Name: Sukhi Burnett   :  1940  MRN:  [id number]    [facility] date:  2022  Discharge date:  2022    Code Status Order: Full Code   Advance Directives:     Admitting Physician:  Helen Krishnamurthy MD  PCP: Mary Alexandra MD    Discharging Nurse: [facility] Unit/Room#: ENDO/NONE  Discharging Unit Phone Number: [phone]    Emergency Contact:   Extended Emergency Contact Information  Primary Emergency Contact:  Wesly Bowen  Address: [address] [place] [address]  Home Phone: [phone]  Mobile Phone: [phone]  Relation: Spouse    Past Surgical History:  Past Surgical History:   Procedure Laterality Date    APPENDECTOMY      BRONCHOSCOPY N/A 2022    BRONCHOSCOPY ALVEOLAR LAVAGE performed by Miranda Self MD at [place] [address]  2022    BRONCHOSCOPY THERAPUTIC ASPIRATION INITIAL performed by Miranda Self MD at [address]      left    CATARACT REMOVAL      CERVICAL FUSION  2010    CHOLECYSTECTOMY      COLONOSCOPY N/A 3/8/2020    COLONOSCOPY FLEXIBLE W/ DECOMPRESSION performed by Yanira Pineda MD at [address] [facility] (CERVIX STATUS UNKNOWN)      IR MIDLINE CATH  10/8/2021    IR MIDLINE CATH 10/8/2021 [address] SPECIAL PROCEDURES    JOINT REPLACEMENT Bilateral     KNEE SURGERY      replacement x 2    OVARY REMOVAL      SHOULDER SURGERY      TONSILLECTOMY      TRACHEOSTOMY N/A 7/15/2022    TRACHEOTOMY WITH PERCUTANEOUS ENDOSCOPIC GASTROSTOMY TUBE PLACEMENT performed by Chung Harris MD at [address]   2020    Esophagitis    UPPER GASTROINTESTINAL ENDOSCOPY N/A 3/17/2020    EGD performed by Elliot Garsia MD at [address]         Immunization History:   Immunization History   Administered Date(s) Administered    COVID-19, PFIZER Bivalent BOOSTER, (age 12y+), IM, 30 mcg/0.3 mL dose 10/12/2022    COVID-19, PFIZER GRAY top, DO NOT Dilute, (age 15 y+), IM, 30 mcg/0.3 mL 04/08/2022    COVID-19, PFIZER PURPLE top, DILUTE for use, (age 15 y+), 30mcg/0.3mL 02/10/2021, 02/10/2021, 03/03/2021, 03/03/2021, 11/09/2021    Hepatitis A 05/08/2019    Hepatitis A Adult (Havrix, Vaqta) 05/08/2019, 05/08/2019, 12/17/2019    Hepatitis A Adult (Vaqta) 05/08/2019    Influenza A ([id number]) Vaccine PF IM 01/23/2010, 01/23/2010    Influenza Vaccine, unspecified formulation 08/09/2016, 09/01/2017    Influenza Virus Vaccine 09/27/2011, 10/01/2012, 08/09/2016, 09/01/2017    Influenza Whole 09/02/2015    Influenza, FLUAD, (age 72 y+), Adjuvanted, 0.5mL 10/20/2021    Influenza, FLUMIST, (age 2-51 y), Live, Intranasal, 0.2mL 09/21/2020    Influenza, FLUZONE (age 72 y+), High Dose, 0.7mL 09/21/2020, 09/21/2020    Influenza, High Dose (Fluzone 65 yrs and older) 09/09/2015, 09/09/2015, 08/15/2016, 08/15/2016, 09/15/2017, 09/14/2018, 09/21/2020    Influenza, Triv, inactivated, subunit, adjuvanted, IM (Fluad 65 yrs and older) 09/27/2019    Pneumococcal Conjugate 13-valent ([id number]) 07/28/2015    Pneumococcal Polysaccharide ([id number]) 11/26/2007    Tdap (Boostrix, Adacel) 03/30/2021    Zoster Live (Zostavax) 05/08/2019, 12/17/2019    Zoster Recombinant (Shingrix) 05/08/2019, 05/08/2019, 12/17/2019       Active Problems:  Patient Active Problem List   Diagnosis Code    GERD (gastroesophageal reflux disease) K21.9    Essential hypertension I10    Hyperlipidemia E78.5    Restless leg syndrome G25.81    Anemia of chronic disease D63.8    Postural dizziness R42    Moderate persistent asthma J45.40    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-sided sciatica M54.41, G89.29    Morbid obesity with BMI of 40.0-44.9, adult ([facility]) E66.01, Z68.41    Type 2 diabetes mellitus with stage 4 chronic kidney disease, without long-term current use of insulin (HCC) E11.22, N18.4    CAD (coronary artery disease) I25.10    Parkinson disease ([facility] G20    Severe obstructive sleep apnea G47.33    Hypothyroidism E03.9    Hepatic steatosis K76.0    Diverticulosis K57.90    Blind left eye H54.40    L carotid artery stenosis s/p CEA I65.29    Septicemia (HCC) A41.9    PAF (paroxysmal atrial fibrillation) (HCC) I48.0    COPD (chronic obstructive pulmonary disease) (HCC) J44.9    Acute respiratory failure with hypoxia ([facility]) Y78.80    Diastolic congestive heart failure (HCC) I50.30    Partial symptomatic epilepsy with complex partial seizures, not intractable, without status epilepticus ([facility]) G40.209    Chronic kidney disease N18.9    Acute kidney injury superimposed on CKD ([facility]) N17.9, N18.9    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    Sialadenitis K11.20    Chest pain R07.9    Pulmonary HTN ([facility]) I27.20    Mitral valve insufficiency I34.0    C. difficile colitis A04.72    Laceration of left lower extremity S81.812A    Pulmonary nodules R91.8    Sensorineural hearing loss, bilateral H90.3    Aspiration pneumonia of left lower lobe ([facility]) J69.0    Severe protein-calorie malnutrition ([facility]) E43    Chronic anemia D64.9    Thrush B37.0    MRSA colonization Z22.322    BETSY (obstructive sleep apnea) A72.66    Metabolic acidosis S22.97    Pulmonary congestion R09.89    Mucus plugging of bronchi T17.500A    Atrial fibrillation, chronic ([facility]) I48.20    Mild protein-calorie malnutrition (HCC) E44.1    Ventilator dependent ([facility]) Z99.11    Gastrostomy tube obstruction ([facility]) K94.23       Isolation/Infection:   Isolation            Contact          Patient Infection Status       Infection Onset Added Last Indicated Last Indicated By Review Planned Expiration Resolved Resolved By    MRSA 06/17/22 06/18/22 06/17/22 MRSA DNA Probe, Nasal        Resolved    COVID-19 (Rule Out) 11/07/22 11/07/22 11/07/22 COVID-19 & Influenza Combo (Ordered)   11/07/22 Rule-Out Test Resulted    C-diff Rule Out 06/30/22 22 Clostridium difficile toxin/antigen (Ordered)   22 Fany Francois RN    Order     COVID-19 (Rule Out) 22 COVID-19 & Influenza Combo (Ordered)   22 Rule-Out Test Resulted    C-diff Rule Out 10/07/21 10/07/21 10/07/21 GI Bacterial Pathogens By PCR (Ordered)   10/07/21 Rule-Out Test Resulted            Nurse Assessment:  Last Vital Signs: BP (!) 177/82   Pulse 86   Temp 98.2 °F (36.8 °C) (Temporal)   Resp 20   Ht 5' 6\" (1.676 m)   Wt 237 lb 3.2 oz (107.6 kg)   SpO2 96%   BMI 38.29 kg/m²     Last documented pain score (0-10 scale): Pain Level: 0  Last Weight:   Wt Readings from Last 1 Encounters:   22 237 lb 3.2 oz (107.6 kg)     Mental Status:  oriented and alert    IV Access:  - None    Nursing Mobility/ADLs:  Walking   Assisted  Transfer  Assisted  Bathing  Assisted  Dressing  Assisted  Toileting  Assisted  Feeding  Assisted  Med Admin  Assisted  Med Delivery   Crushed    Wound Care Documentation and Therapy:  Incision 07/15/22 Abdomen Left;Medial;Upper (Active)   Number of days: 116       Incision 07/15/22 Throat (Active)   Number of days: 116        Elimination:  Continence: Bowel: Yes  Bladder: Yes         Date of Last BM: 2022    Intake/Output Summary (Last 24 hours) at 2022 1502  Last data filed at 2022 0700  Gross per 24 hour   Intake --   Output 1500 ml   Net -1500 ml     I/O last 3 completed shifts:  In: -   Out: 1500 [Urine:1500]    Safety Concerns:      At Risk for Falls    Impairments/Disabilities:      None    Nutrition Therapy:  Current Nutrition Therapy:       Routes of Feeding: Gastrostomy Tube        Rehab Therapies: Physical Therapy and Occupational Therapy  Weight Bearing Status/Restrictions: No weight bearing restrictions  Other Medical Equipment (for information only, NOT a DME order):      Patient's personal belongings (please select all that are sent with patient):  ***    RN SIGNATURE:  Electronically signed by Mattie Black RN on 11/8/22 at 5:01 PM EST    CASE MANAGEMENT/SOCIAL WORK SECTION    Inpatient Status Date: 11/7/2022    Readmission Risk Assessment Score:  Readmission Risk              Risk of Unplanned Readmission:  0           Discharging to Facility/ Agency   Name: [facility]  Address: [address] [place] [address], [address]  Phone: [phone]  Fax: [phone]     Dialysis Facility (if applicable)   Name:  Address:  Dialysis Schedule:  Phone:  Fax:    / signature: Electronically signed by Fredy Talbert RN on 11/8/22 at 3:16 PM EST    PHYSICIAN SECTION    Prognosis: Good    Condition at Discharge: Stable    Rehab Potential (if transferring to Rehab): Good    Recommended Labs or Other Treatments After Discharge: PT and OT. Resume PEG feeds. ST at rehab    Physician Certification: I certify the above information and transfer of Cassandra Meyers  is necessary for the continuing treatment of the diagnosis listed and that she requires [place] for less 30 days.      Update Admission H&P: No change in H&P    PHYSICIAN SIGNATURE:  Electronically signed by Yareli Diaz MD on 11/8/22 at 3:59 PM EST

## 2022-11-08 NOTE — DISCHARGE INSTRUCTIONS
Your information:  Name: Peter Campbell  : 1940    Your instructions: Follow up with family doctor in 7-10 days. What to do after you leave the hospital:    Recommended diet:  as prior to amdmission    Recommended activity: activity as tolerated        The following personal items were collected during your admission and were returned to you:    Belongings  Dental Appliances: None  Vision - Corrective Lenses: None  Hearing Aid: None  Clothing: Bathrobe  Jewelry: None  Electronic Devices: None  Weapons (Notify Protective Services/Security): None  Home Medications: None  Valuables Given To: Patient  Provide Name(s) of Who Valuable(s) Were Given To: Goyo Escalante  Responsible person(s) in the waiting room: Anthony Arevalo 43  Patient approves for provider to speak to responsible person post operatively: Yes    Information obtained by:  By signing below, I understand that if any problems occur once I leave the hospital I am to contact family doctor. I understand and acknowledge receipt of the instructions indicated above.

## 2022-11-08 NOTE — PROGRESS NOTES
Comprehensive Nutrition Assessment    Type and Reason for Visit:  Initial, Positive Nutrition Screen (+ screen for home EN regimen)    Nutrition Recommendations/Plan:   Continue NPO status until patient is medically cleared to receive nutrition therapy. TF recommendations - ADULT TUBE FEEDING; PEG tube; Other formula - Glucerna 1.2 with a goal rate of 75 ml/hr x 20 hours. Start with 25 ml/hr and increase by 20 ml every 4 hours, as tolerated by patient, until goal rate can be achieved and maintained. Water flushes, 30 ml every 3 hours for tube patency. Monitor whether TF is started + TF rate, intake, and tolerance when started and water flushes. Monitor nutrition-related labs, bowel function, and weight trends. Malnutrition Assessment:  Malnutrition Status:   At risk for malnutrition (11/08/22 1310)    Context:  Chronic Illness     Findings of the 6 clinical characteristics of malnutrition:  Energy Intake:  Mild decrease in energy intake   Weight Loss:  No significant weight loss     Body Fat Loss:  Unable to assess (patient is having PEG tube replaced this afternoon)     Muscle Mass Loss:  Unable to assess (patient is having PEG tube replaced this afternoon)    Fluid Accumulation:  No significant fluid accumulation     Strength:  Not Performed    Nutrition Assessment:    patient is nutritionally compromised AEB patient presented to ED from SNF with PEG tube malfunction and GI distress r/t PEG tube malfunction and she is at risk for further compromise d/t NPO status, need for PEG tube replacement, and need for EN as sole source of nutrition; will provide EN recommendations    Nutrition Related Findings:    patient is A & O x 4; she has been at [facility] for rehab prior to this admission; + PEG tube malfunction and patient is in endo at this time having PEG tube replaced; she is [facility]; she has iron, lasix, senokot-s, MVI, and NS at 75 ml/hr ordered at this time; SLP has evaluated patient at [facility] and patient has reportedly trialed and tolerated applesauce but no solid food, per patient' spouse Wound Type: None       Current Nutrition Intake & Therapies:    Average Meal Intake: NPO  Average Supplements Intake: NPO  Diet NPO    Anthropometric Measures:  Height: 5' 6\" (167.6 cm)  Ideal Body Weight (IBW): 130 lbs (59 kg)    Admission Body Weight: 237 lb 3.2 oz (107.6 kg) (obtained on 11/7/22; actual weight)  Current Body Weight: 237 lb 3.2 oz (107.6 kg) (obtained on 11/7/22; actual weight), 182.5 % IBW.  Weight Source: Bed Scale  Current BMI (kg/m2): 38.3  Usual Body Weight: 253 lb 2 oz (114.8 kg) (7/14/22; actual weight)  % Weight Change (Calculated): -6.3  Weight Adjustment For: No Adjustment                 BMI Categories: Obese Class 2 (BMI 35.0 -39.9)    Estimated Daily Nutrient Needs:  Energy Requirements Based On: Kcal/kg  Weight Used for Energy Requirements: Current  Energy (kcal/day): 1620 - 1944 kcals based on 15-18 kcals/kg/CBW  Weight Used for Protein Requirements: Ideal  Protein (g/day): 71 - 89 g protein based on 1.2-1.5 g/kg/IBW  Method Used for Fluid Requirements: 1 ml/kcal  Fluid (ml/day): 1620 - 1944 ml    Nutrition Diagnosis:   Inadequate oral intake related to inadequate protein-energy intake, altered GI function, altered GI structure, inadequate enteral nutrition infusion as evidenced by NPO or clear liquid status due to medical condition, poor intake prior to admission, lab values, GI abnormality, other (comment) (PEG tube malfunction)    Nutrition Interventions:   Food and/or Nutrient Delivery: Continue NPO, Start Tube Feeding  Nutrition Education/Counseling: No recommendation at this time  Coordination of Nutrition Care: Continue to monitor while inpatient, Coordination of Care       Goals:     Goals: Initiate nutrition support, by next RD assessment       Nutrition Monitoring and Evaluation:   Behavioral-Environmental Outcomes: None Identified  Food/Nutrient Intake Outcomes: Enteral Nutrition Intake/Tolerance, IVF Intake  Physical Signs/Symptoms Outcomes: Biochemical Data, Hemodynamic Status, Nutrition Focused Physical Findings, Skin, Weight    Discharge Planning:     Too soon to determine     Lily Tobias, [address], [place]  Contact: [phone]

## 2022-11-08 NOTE — PROCEDURES
EGD PROCEDURE NOTE         Esophagogastroduodenoscopy Procedure Note     Patient: Inna Durant MRN: [id number]   YOB: 1940 Age: [de-identified] y.o. Sex: female   Unit: [facility] Room/Bed: ENDO/NONE Location: [address]    Admitting Physician: Demetria Paz     Primary Care Physician: Ty Albrecht MD      DATE OF PROCEDURE: 11/8/2022  PROCEDURE: Esophagogastroduodenoscopy  INDICATION: This is a [de-identified]y.o. year old female who presents today functional PEG tube  ENDOSCOPIST: Milan Reyna DO    POSTOPERATIVE DIAGNOSIS: Malfunctioning PEG tube   PLAN: Okay to resume tube feeds    INFORMED CONSENT:  Informed consent for esophagogastoduodenoscopy was obtained. The benefits and risks including adverse medicine reaction have been explained. The patient's questions were answered and the patient agreed to proceed. ASA:  ASA 3 - Patient with moderate systemic disease with functional limitations    SEDATION: MAC     The patient's vital signs, cardiac status, pulmonary status, abdominal status and mental status were stable for the procedure. The patient's vitals signs and respiratory function as monitored by oxygen saturation were stable throughout    Procedure Details: The Olympus videoendoscope was inserted into the mouth and carefully passed into the esophagus, through the stomach and to the distal duodenum. Antegrade and retrograde examination of the upper gi tract was carefully performed. Findings: The esophagus is normal.  The z-line is distinct without lesions or irregularities. There is no evidence of Nolan's esophagus. The scope easily passed into the stomach. The mucosa of the cardia, fundus, body and antrum of the stomach is normal.  PEG tube was noted in the anterior wall of this gastric body. This was removed with traction and replacement MICU tube was placed and 10 cc of saline were placed in the balloon.   The replacement tube was a 25 [place] tube.  The pylorus is patent and of normal contour. The scope easily advanced into the duodenal bulb and down to the distal duodenum.   Ante-and retrograde exam of the duodenum is normal.    Gastric or Duodenal ulcer present: No    Estimated Blood Loss: Minimal      Signed By: Sondra Shin DO

## 2022-11-08 NOTE — PROGRESS NOTES
Speech Language Pathology  Clinical Bedside Swallow Assessment  Facility/Department: [facility]        Recommendations:  Diet recommendation: NPO, meds/nutrition via alternative means   Instrumentation: would recommend completion of MBSS or FEES prior to initiation of PO intake; per report, pt likely to return to EGS post-op. Pt's  reported pt has had 2 mobile MBSS completed at S with most recent study completed \"about a month ago\"  Risk management: oral care q4 hrs to reduce adverse affects in the event of aspiration and general aspiration precautions  SLP provided extensive education regarding importance for ongoing dysphagia tx, reviewed several laryngeal/pharyngeal strengthening exercises, and emphasized completion of good oral care q4 hours. Pt and pt's  verbalized understanding of all reviewed information      Kednall Diss  : 1940 ([de-identified] y.o.)   MRN: [id number]  ROOM: [facility]-  ADMISSION DATE: 2022  PATIENT DIAGNOSIS(ES): PEG tube malfunction (HCC) [K94.23]  Gastrojejunostomy tube dislodgement [T85.528A]  Chronic kidney disease, unspecified CKD stage [N18.9]  Chief Complaint   Patient presents with    Abdominal Pain     Pt comes from nursing home due to issue with PEG tube. Pt complaining of pain and distension. Nursing home reports problems with clogging and talked about replacing it.  GI wanted to send Pt to ER     Patient Active Problem List    Diagnosis Date Noted    Gastrostomy tube obstruction ([address] 2022    Ventilator dependent ([address] 2022    Mild protein-calorie malnutrition ([address] 2022    Atrial fibrillation, chronic (HCC)     Mucus plugging of bronchi     Pulmonary congestion     Thrush 2022    MRSA colonization 2022    BETSY (obstructive sleep apnea)     Metabolic acidosis     Chronic anemia     Severe protein-calorie malnutrition ([address] 2022    Aspiration pneumonia of left lower lobe ([address] Sensorineural hearing loss, bilateral 04/07/2022    Laceration of left lower extremity     Pulmonary nodules     C. difficile colitis 10/07/2021    Chest pain 09/27/2021    Pulmonary HTN ([facility]     Mitral valve insufficiency     Sialadenitis     Colonic pseudoobstruction     Acute kidney injury superimposed on CKD ([address]     Chronic kidney disease 03/01/2020    Partial symptomatic epilepsy with complex partial seizures, not intractable, without status epilepticus ([facility] 52/80/7023    Diastolic congestive heart failure ([address] 01/17/2019    Acute respiratory failure with hypoxia (HCC)     COPD (chronic obstructive pulmonary disease) ([address] 12/10/2018    PAF (paroxysmal atrial fibrillation) ([facility]     Septicemia ([facility] 09/08/2018    Severe obstructive sleep apnea     Hepatic steatosis     Diverticulosis     Blind left eye     Parkinson disease ([address] 05/22/2018    CAD (coronary artery disease)     Morbid obesity with BMI of 40.0-44.9, adult ([facility] 02/10/2017    Type 2 diabetes mellitus with stage 4 chronic kidney disease, without long-term current use of insulin ([facility] 02/10/2017    Moderate persistent asthma 08/09/2016    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-sided sciatica 08/09/2016    Hypothyroidism 01/27/2015    Postural dizziness 02/20/2013    Anemia of chronic disease     GERD (gastroesophageal reflux disease)     Essential hypertension     Hyperlipidemia     Restless leg syndrome     L carotid artery stenosis s/p CEA 01/01/2002     Past Medical History:   Diagnosis Date    Anemia, unspecified 2010    neg bone marrow    Asthma     Atrial fibrillation ([address]     Baker's cyst     Basal cell carcinoma of left cheek 3/12/2019    Blind left eye     Carotid artery stenoses     Carotid stenosis 2002    left    Cervical spinal cord compression ([address] 11/2010    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-sided sciatica 8/9/2016    CKD (chronic kidney disease) stage 3, GFR 30-59 ml/min ([address]     Community acquired pneumonia of left lower lobe of lung 9/8/2018    CRI     Detached retina, left     Diverticulosis     DJD (degenerative joint disease)     Edema     chronic    Fatty liver     GERD (gastroesophageal reflux disease)     Hearing loss     Herniated lumbar intervertebral disc     Hx stage 2 sacral decubitus ulcer 4/13/2018    Hyperlipidemia     Hypertension     Hypothyroid 1/27/2015    Morbid obesity with BMI of 40.0-44.9, adult ([address] 2/10/2017    BETSY treated with BiPAP     Parkinson's disease ([facility]     Partial symptomatic epilepsy with complex partial seizures, not intractable, without status epilepticus ([address] 1/24/2020    Restless leg syndrome     Restless legs syndrome     Rheumatic fever 1950    2x IN [place] [place]    Sleep apnea     Tremor, essential 8/16/2010    Type 2 diabetes mellitus without complication ([facility]     Type II or unspecified type diabetes mellitus without mention of complication, not stated as uncontrolled      Past Surgical History:   Procedure Laterality Date    APPENDECTOMY      BRONCHOSCOPY N/A 7/7/2022    BRONCHOSCOPY ALVEOLAR LAVAGE performed by Braydon Gonzalez MD at [address]  7/7/2022    BRONCHOSCOPY [address] performed by Braydon Gonzalez MD at [address]      left    CATARACT REMOVAL      CERVICAL FUSION  11/2010    CHOLECYSTECTOMY      COLONOSCOPY N/A 3/8/2020    COLONOSCOPY FLEXIBLE W/ DECOMPRESSION performed by Paulette Jansen MD at [facility] (CERVIX STATUS UNKNOWN)      IR MIDLINE CATH  10/8/2021    IR MIDLINE CATH 10/8/2021 [address] SPECIAL PROCEDURES    JOINT REPLACEMENT Bilateral 2003    KNEE SURGERY      replacement x 2    OVARY REMOVAL      SHOULDER SURGERY      TONSILLECTOMY      TRACHEOSTOMY N/A 7/15/2022    TRACHEOTOMY WITH PERCUTANEOUS ENDOSCOPIC GASTROSTOMY TUBE PLACEMENT performed by Juan Manuel Bower MD at [facility] UPPER GASTROINTESTINAL ENDOSCOPY  03/17/2020    Esophagitis    UPPER GASTROINTESTINAL ENDOSCOPY N/A 3/17/2020    EGD performed by Isaias López MD at [address] [Levofloxacin]      Pt was given in er and developed hives and redness    Metoclopramide     Phenazopyridine     Pyridium [Phenazopyridine Hcl]     Reglan [Metoclopramide Hcl]     Reglan [Metoclopramide Hcl]      tremors       DATE ONSET: Pt admitted to [facility] on 11/7/22    Date of Evaluation: 11/8/2022   Evaluating Therapist: JAZZ Hernandez    Chart Reviewed: : [x] Yes [] No    Current Diet: Diet NPO    Recent Chest Radiography: [] Chest XR   [x] CT Abdomen Pelvis  Date: 11/7/22  Impressions  Impression   1. No acute findings in the abdomen or pelvis. 2. Percutaneous gastrostomy tube with the balloon at the gastric antrum. 3. New airspace opacity at the medial aspect of the right lower lobe that   could reflect atelectasis or pneumonia. Pain: no c/o pain    Reason for Referral  Russ Cline was referred for a bedside swallow evaluation to assess the efficiency of their swallow function, identify signs and symptoms of aspiration and make recommendations regarding safe dietary consistencies, effective compensatory strategies, and safe eating environment. Assessment    Medical record review/interview: Per PA H&P: \"Krysten Betancourt is a [de-identified] y.o. female with past medical history of GERD, hypertension, hyperlipidemia, coronary artery disease, obstructive sleep apnea, hypothyroidism, left carotid artery stenosis status post CEA, type 2 diabetes, paroxysmal atrial fibrillation, chronic kidney disease, history of C. difficile colitis, mitral valve insufficiency, fatty liver disease, anticoagulated on Eliquis who presents via EMS from her skilled nursing facility for evaluation of abdominal pain and clogged G-tube.   Patient notes that her G-tube became clogged this morning. She also notes that after every tube feed for the last 24 hours she has been having abdominal distention and pain. She denies any nausea vomiting, denies any melena hematochezia no diarrhea. Denies any fevers. No urinary symptoms. She denies any chest pain cough shortness of breath no headache no sore throat. She follows with Dr. Hanane Cardona". Predisposing dysphagia risk factors: Parkinson's Disease, Other chronic respiratory illness, Age, Hx of Trach/PEG, GERD, Hx of dysphagia, and Hx of aspiration  Clinical signs of possible chronic dysphagia: current use of PEG/TF, hx of dysphagia, hx of aspiration, and hx of tracheostomy  Precipitating dysphagia risk factors: reduced physical mobility and suspected disuse atrophy    Patient Complaints: pt currently NPO exception of low volume ice chips after oral care and PO trials with SLP at NH  Odynophagia: [] Yes [] No  Globus Sensation: [] Yes [] No  SOB with PO intake: [] Yes [] No  Increased WOB with PO intake: [] Yes [] No  Reflux Sx's: [] Yes [] No  Weight loss: [] Yes [] No  Coughing/Choking with PO intake: [] Yes [] No  Reduced Appetite: [] Yes [] No    Additional Reported Symptoms/Complaints/Hospital Course: Pt admitted d/t PEG tube malfunction, CKD. Pt to have EGD/PEG replacement later this date per report. Pt has PMHx of BETSY, Parkinson's disease, and GERD per chart. Pt was seen by ST during admission at [facility] June-July 2022. Per ST note on 7/7/22, \"We were initially consulted on 06/17 upon admission as pt was admitted with asipration PNA. Plan was to complete MBS. Pt was noted with increasing O2 demands and was sent to ICU. She was eventually intubated on 06/18 and extubated on 06/27 to BiPAP. Hx of aspiration and dysphagia and seen as OP at [facility]. Pt's  present in room during BSE. Stated that pt has had difficulty with swallowing, including globus sensation and coughing/choking with PO intake.  Pt reported reduced appetite since admission. Per spouse takes sinemet 4x/day and does not always correlate dosage to PO intake. FEES initially completed on 06/30 with aspiration of thin liquids and rec's for soft and bite-sized solids and mildly thick (nectar) liquids. Later, the patient was noted with emesis on Bi-PAP and aspiration of emesis. She was transferred to the ICU for worsening O2 demands and was noted with poor cough ability and clinical s/s of aspiration at bedside yesterday. Made NPO and bronch completed this am. Requesting PO. Will complete repeat FEES to further assess tolerance and oropharyngeal function\". Pt had FEES completed 7/7/22 which recommended puree solids (IDDSI 4) with mildly (nectar) thick liquids, crushable meds crushed in puree, ice chips OK after oral care. Pt was discharged from [address] [place] [address] on 7/12/22 d/t re-intubation. Per pt's  at bedside, pt required trach and PEG placement (7/15/22 per chart), pt was transferred to [facility] from [facility]. The pt is now at [facility] where she receives ST \"three times a week\" per pt and pt's . They reported that the pt is allowed ice chips after oral care and receives some PO with SLP. Pt's  reported that the pt has had 2 mobile MBSS completed at [facility] with most recent study \"about a month ago\".       Vitals/labs per most recent reading:   Temp: n/a  SpO2:100%  RR: 16/min  BP: 150/63  HR: 85  O2 device: RA      CBC:   Recent Labs     11/08/22  0526   WBC 8.3   HGB 8.2*         BMP:  Recent Labs     11/08/22  0526      K 4.9      CO2 24   BUN 20   CREATININE 1.1   GLUCOSE 101*          Cranial nerve exam:   CN V (trigeminal): ophthalmic, maxillary, and mandibular facial sensation- WFL  CN VII (facial): Reduced  CN IX/X (glossopharyngeal/vagus): MPT: DNT; pitch range: DNT; vocal quality: Adequate; cough: Weak- perceptually, Non-Productive, and Dry  CN XII (hypoglossal): Reduced and Lingual fasciculations upon protrusion    Laryngeal function exam:   Secretions: n/a  Vocal quality: See CN exam above  MPT: See CN exam above  S/Z ratio: DNT  Pitch range: See CN exam above  Cough: See CN exam above    Oral Care Status:    [x] Oral Care [facility]  [] Poor oral care status  [] Edentulous  [] Upper Dentures  [] Lower Dentures  [] Missing/Broken Teeth  [] Evidence of dental cavities/carries    PO trials:    3 oz water: DNT    Impressions:  Pt currently NPO pending procedure later this date per MD.  SLP spoke with pt and pt's  at length regarding current diet recommendations, recommendation for ongoing dysphagia tx with completion of instrumental swallow study in the future prior to initiation of further PO intake (currently ice chips after oral care and low volume PO with SLP only). SLP reviewed several laryngeal/pharyngeal strengthening exercises with pt and pt's  (I.e. effortful swallow, Shiela, tongue press). Pt able to complete x2 effortful swallows, attempted shiela. SLP encouraged pt to complete exercises after completion of oral care. She verbalized understanding. Pt and pt's  denied further questions/concerns for SLP. Recommendations:  Diet recommendation: NPO, meds/nutrition via alternative means   Instrumentation: would recommend completion of MBSS or FEES prior to initiation of PO intake; per report, pt likely to return to EGS post-op. Pt's  reported pt has had 2 mobile MBSS completed at [facility] with most recent study completed \"about a month ago\"  Risk management: oral care q4 hrs to reduce adverse affects in the event of aspiration and general aspiration precautions  SLP provided extensive education regarding importance for ongoing dysphagia tx, reviewed several laryngeal/pharyngeal strengthening exercises, and emphasized completion of good oral care q4 hours.   Pt and pt's  verbalized understanding of all reviewed information      Prognosis: Fair    Recommended Intervention:   [] Dysphagia tx  [] Videostroboscopy                      [x] NPO   [x] MBS       [] Speech/Cog Eval    [x] Therapeutic PO Trials     [x] Ice Chips   [] Other:  [x] FEES                                                 Dysphagia Therapeutic Intervention:   []  Bolus control Exercises  []  Oral Motor Exercises  []  [employer] Protocol  []  Thermal Stimulation  [x]  Oral Care    []  Vital Stim/NMES  [x]  Laryngeal Exercises  [x]  Patient/Family Education  [x]  Pharyngeal Exercises  [x]  Therapeutic PO trials with SLP  [x]  Diet tolerance monitoring  []  Other:     Referrals:  [] ENT    [] PT  [] Pulmonology [] GI  [] Neurology  [] RD  [] OT   []     Goals:  Short Term Goals:  Timeframe for Short Term Goals: (2 days, 11/10/22)  Goal 1: The patient will tolerate repeat BSE when able for ongoing assessment  Goal 2: The patient/caregiver will demonstrate understanding of compensatory swallow strategies, for improved swallow safety  Goal 3: The patient will tolerate instrumental assessment when able   Goal 4: The patient will complete laryngopharyngeal strengthening exercises with 90% acc given min cues    Long Term Goals:   Timeframe for Long Term Goals: (3 days, 11/11/22)  Goal 1: The patient will participate in instrumental swallow study. Treatment:  Skilled instruction completed with patient re: evidenced based practice regarding recommendations and POC, importance of oral care to reduce adverse affects in the event of aspiration, and instruction of recommended compensatory strategies developed based upon clinical exam. Pt able to recall/demonstrate compensatory strategies with min cues.       Pt Education: SLP educated the patient re: Role of SLP, rationale for completion of assessment, recommendations, and POC  Pt Education Response: verbalized understanding, would benefit from ongoing education, RN aware, and caregiver aware    Duration/Frequency of Tx: 3-5x/week for LOS    Individuals Consulted:   [x]  Patient     [] NP         [x]  RN   []  RD                   [x]  MD      [x]  Family Member                        []  PA    []  Other:      Safety Devices / Report:  [x]  All fall risk precautions in place [x]  Safety handoff completed with RN  [x]  Bed alarm in place  [x]  Left in bed     []  Chair alarm in place  []  Left in chair   [x]  Call light in reach   []  Other:                        Total Treatment Time / Charges       Time in Time out Total Time / units   Swallow Eval/Tx Time  0940 0942 25 min / 2 units     Signature:  Jenise Ibarra M.S. [id number] [facility]  Speech-language pathologist  [id number]

## 2022-11-09 ENCOUNTER — TELEPHONE (OUTPATIENT)
Dept: FAMILY MEDICINE CLINIC | Age: 82
End: 2022-11-09

## 2022-11-09 ENCOUNTER — CARE COORDINATION (OUTPATIENT)
Dept: CASE MANAGEMENT | Age: 82
End: 2022-11-09

## 2022-11-09 NOTE — CARE COORDINATION
[facility] Update Call    2022    Patient: Tawanda Whiting Patient : 1940   MRN: <[id number]>  Reason for Admission: PEG tube malfunction (clogged, pain and distension at site)  Discharge Date: 22 RARS: Readmission Risk Score: 19.6       Secure email sent to [address] [place] [address] with therapy at [facility] requesting admission orders and med list. Will continue to follow Dominic Numbers sent orders and med list. Reviewed  Post Acute Facility Update    Care Transitions Post Acute Facility Update    Care Transitions Interventions  Post Acute Facility: [address] [place] [address] [address] Update  Reported Nursing Issues: Readmitted back to [facility] for PEG revision (had been there 88 days before this observation admission and 26 days at [address]). Enteral feed order- every 3 hours flush feeding tube wih 30 ml of water. Jevity 1.5 @ 55 mL/ hour continuous via pump per PEG tube. Enteral Feed Order every day and night shift Check residual Q shift. If residual is greater then 500ml hold TF for 1 hour and recheck. After 1 hour if still greater than 500ml continue to hold and contact clinician and document in progress note. Enteral feed protocol. Bipap at HS. Breathing treatment 2 times/ day. Lung assess before and after. Acapella 5-10 breaths 4 times/ day. IS every 2 hours while awake x 10. VS every shift  PT/ OT eval and treat. Next IDT Planned Review: 11/10/2022    Future Appointments   Date Time Provider Katy Booker   2022 10:00 AM MAHAD Singh - [facility]   2022  1:30 PM Adriano Duarte MD [facility]       SN Notes:   Diet: - Tube Feedings:  30 ml water times per day, every 3 hours  ml/hr over 5 hrs per day, and continuous   Wounds: none  Medications:  Other: facility  Other:    Post-acute CC Notes:   Readmitted back to [facility] for PEG revision (had been there 88 days before this observation admission and 26 days at [address]) Kaleigh Oquendo RN

## 2022-11-09 NOTE — TELEPHONE ENCOUNTER
[facility] Transitions Initial Follow Up Call    Outreach made within 2 business days of discharge: Yes    Patient: Chely Awad Patient : 1940   MRN: [id number]  Reason for Admission: There are no discharge diagnoses documented for the most recent discharge. Discharge Date: 22       Spoke with: PT's  POA, Pt had EGD/PEG REPLACEMENT, and is doing well. Pt is in SNF and will make an follow up Appt when discharged in 2 weeks. [place] [facility] [place]    Non-face-to-face services provided:  Obtained and reviewed discharge summary and/or continuity of care documents    TCM Interactive Patient Contact:  Was patient able to fill all prescriptions: Yes  Was patient instructed to bring all medications to the follow-up visit: Yes  Is patient taking all medications as directed in the discharge summary?  Yes  Does patient understand their discharge instructions: Yes  Does patient have questions or concerns that need addressed prior to 7-14 day follow up office visit: no    Scheduled appointment with PCP within 7-14 days    Follow Up  Future Appointments   Date Time Provider Katy Booker   2022 10:00 AM MAHAD Paz - CNP [facility]   2022  1:30 PM MD Levon Segundo [facility]       Diana Mcnair LPN

## 2022-11-11 ENCOUNTER — CARE COORDINATION (OUTPATIENT)
Dept: CASE MANAGEMENT | Age: 82
End: 2022-11-11

## 2022-11-11 NOTE — CARE COORDINATION
[facility] Update Call    2022    Patient: Amanda Early Patient : 1940   MRN: <[id number]>  Reason for Admission: PEG tube malfunction (clogged, pain and distension at site)   Discharge Date: 22 RARS: Readmission Risk Score: 19.6       Secure email sent to Jose with therapy and JOSEPH Pastor requesting clinical (nursing and therapy) update and discharge planning. Will continue to follow     [facility] sent reports with updates as noted. Plan is DC home with   (out of skilled days at that time). She informs family is not interested in hospice per their notes with family     Writer sent secure email to [facility] inquiring about services available through Ambulatory Care upon pt discharge. Will continue to follow     Care Transitions Post Acute Facility Update    Care Transitions Interventions  Post Acute Facility: [address] [place] [address] [address] Update  Reported Nursing Issues: 140/97, 73, 97.0, 18, 92% on room air. MASD to bilateral buttocks and right thigh, red not opened. Antifungal cream applied  Therapies-   Sitting balance fair to fair + ,grooming and hygiene- pt  completes a lot of tasks. Pt able to perform if given time. Toilet transfers- max 2 assist (brief changed in bed). They will try again with [facility] transfers before discharge. Oral hygiene- set up or clean-up assist, bathing- max assist, able to sit 30 seconds unsupported. Car transfer- 1 assist with slide board- needs cues for safety and sequencing. Sit to stand transfer dependent with FWW and mod/ max assist x 2.      Patient continues NPO, tube feed for nutrition only   ADLs: Maximum Assistance   Bed Mobility: Moderate Assistance   Transfer Assistance: Maximum Assistance   Ambulation Assistance: Maximum Assistance   How far (in feet) is the patient ambulating?: 15   Does patient use an assistive device?: Yes   Assistive Devices: 2WW, wheelchair follow behind from a 2nd person   Barriers to Discharge: Extensive physical needs, fall risk, aspiration risk. Need for LTC but  unable to pay for this. cognition   Anticipated date for discharge: 11/24/22    Anticipated discharge services: Home with  and home care- PT, OT, ST, SN referrals made.  has been advised unsure if home care will deem her appropriate for services. Aside from that, it will be the  taking care of her. He has been educated that she needs additional assistance, but he states he does not have the funds to provide that. She will be discharging 11/24 as she exhausts her Medicare benefit that day. We have educated the  extensively on this being an unsafe dc, but he is in denial and unwilling to accept that he is not able to take care of her.   DME at home- hover round, BSC, rollator, cane, reacher, sock aid, LH shoehorn, lift chair (pt sleeps in chair)

## 2022-11-14 ENCOUNTER — CARE COORDINATION (OUTPATIENT)
Dept: CASE MANAGEMENT | Age: 82
End: 2022-11-14

## 2022-11-14 NOTE — CARE COORDINATION
785 [facility] Update Call    2022    Patient: Ruth Pearl Patient : 1940   MRN: <[id number]>  Reason for Admission: PEG tube malfunction (clogged, pain and distension at site)   Discharge Date: 22 RARS: Readmission Risk Score: 19.6       Post Acute Facility Update    Care Transitions Post Acute Facility Update    Care Transitions Interventions  Post Acute Facility: [address] [place] [address] [address] Update  Reported Nursing Issues: Tube feed feeding schedule changed  now nocturnal tube feed with bolus during the day. They are hoping by changing this it should help with mobility out of bed (won't have to take tube feed pump with her when out of bed). Patient is doing the same otherwise  Walking a little with therapy but not without a lot of help   ADLs: Minimal Assistance   Bed Mobility: Moderate Assistance   Transfer Assistance: Moderate Assistance   How far (in feet) is the patient ambulating?: 10   Does patient use an assistive device?: Yes   Assistive Devices: Slide board, 2WW, wheelchair    Barriers to Discharge: Extensive physical needs, fall risk, aspiration risk. Need for LTC but  unable to pay for this. cognition   Anticipated date for discharge: 22    Anticipated discharge services:  Home with  and home care- PT, OT, ST, SN referrals made.  has been advised unsure if home care will deem her appropriate for services. Aside from that, it will be the  taking care of her. He has been educated that she needs additional assistance, but he states he does not have the funds to provide that. She will be discharging  as she exhausts her Medicare benefit that day. We have educated the  extensively on this being an unsafe dc, but he is in denial and unwilling to accept that he is not able to take care of her.   DME at home- hover round, BSC, rollator, cane, reacher, sock aid, LH shoehorn, lift chair (pt sleeps in chair)              Next IDT Planned Review: 11/17/2022    Future Appointments   Date Time Provider Katy Booker   11/30/2022 10:00 AM MAHAD Gipson - CNP [facility]   12/6/2022  1:30 PM MD Francisco Lopez [facility]       SN Notes:   Diet: - Tube Feedings:  Jevity- continuous overnight, bolus during the day   Wounds: buttocks and coccyx Stage 1 nonblanchable erythema or discoloration and getting barrier cream with each incontinent episode   Medications:  Other: facility  Other:    Post-acute CC Notes:     Sean Monteiro RN

## 2022-11-17 ENCOUNTER — CARE COORDINATION (OUTPATIENT)
Dept: CASE MANAGEMENT | Age: 82
End: 2022-11-17

## 2022-11-17 NOTE — CARE COORDINATION
[facility] Update Call    2022    Patient: Govind Sheikh Patient : 1940   MRN: <[id number]>  Reason for Admission: PEG tube malfunction (clogged, pain and distension at site)   Discharge Date: 22 RARS: Readmission Risk Score: 19.6       Post Acute Facility Update    Care Transitions Post Acute Facility Update    Care Transitions Interventions  Post Acute Facility: [address] [place] [address] [address] Update  Reported Nursing Issues: 118/92. 96.9, 67, 18, 91% on room air. Skin- MASD bilateral buttocks- getting barrier cream for this.  has been working with pt and therapy for education on transfers and ambulating. Tube feed running overnight with bolus during the day has helped during the day so she doesn't need to have someone move it for her     Sit to stand transfer- max 2 assist, transfer- pivot min-2 assist; ADL's max 2 for toileting/ transfer to toilet. Hygiene- sup; UB dressing- sup assist; seated for all hygiene as she cannot stand. Bed Mobility: Moderate Assistance   Transfer Assistance: Maximum Assistance   Ambulation Assistance: Maximum Assistance   How far (in feet) is the patient ambulating?: 3   Does patient use an assistive device?: Yes   Assistive Devices: 2WW. Had walked 10 feet with 2WW last week but only 3 feet today    Barriers to Discharge: Extensive physical needs, fall risk, aspiration risk. Need for LTC but  unable to pay for this. cognition   Anticipated date for discharge: 22    Anticipated discharge services: [facility]  home with  and [facility] . Home with  and home care- PT, OT, ST, SN referrals made.  has been advised unsure if home care will deem her appropriate for services. Aside from that, it will be the  taking care of her. He has been educated that she needs additional assistance, but he states he does not have the funds to provide that.  She will be discharging  as she exhausts her Medicare benefit that day. We have educated the  extensively on this being an unsafe dc, but he is in denial and unwilling to accept that he is not able to take care of her. DME at home- hover round, BSC, rollator, cane, reacher, sock aid, LH shoehorn, lift chair (pt sleeps in chair)                          Next IDT Planned Review: 11/21/2022    Future Appointments   Date Time Provider Katy Booker   11/30/2022 10:00 AM MAHAD Young - CNP [facility]   12/6/2022  1:30 PM MD Martha Woods [facility]       SN Notes:   Diet: - Tube Feedings:  Jevity at HS, bolus during the day   Wounds: buttocks Stage 1 nonblanchable erythema or discoloration and using barrier cream for protection   Medications:  Other: facility  Other:    Post-acute CC Notes: LCD 11/23, home 11/24    Boris Solano RN

## 2022-11-21 ENCOUNTER — CARE COORDINATION (OUTPATIENT)
Dept: CASE MANAGEMENT | Age: 82
End: 2022-11-21

## 2022-11-21 NOTE — CARE COORDINATION
[facility] Update Call    2022    Patient: Gely Tomlin Patient : 1940   MRN: <[id number]>  Reason for Admission: PEG tube malfunction (clogged, pain and distension at site)   Discharge Date: 22 RARS: Readmission Risk Score: 19.6       Secure email sent to [facility] rehab director and lead requesting information prior to IDT call at [facility], lead with therapy calls to inform pt is doing the same. They have been working with her  to train on car transfers. He has declined transportation home and will be doing this on his own despite concerns raised about this. She will be able to be taken into home via wheelchair at least.  She will be going home with [address] [place] [address] Update    Care Transitions Interventions  Post Acute Facility: [address] [place] [address] 231 Update       Post Acute Facility Update    Care Transitions Post Acute Facility Update    Care Transitions Interventions  Post Acute Facility: [address] [place] [address] Update  Reported Nursing Issues: Patient is doing the same. They have done car transfers with her    Barriers to Discharge: Extensive physical needs, fall risk, aspiration risk. Need for LTC but  unable to pay for this. cognition   Anticipated date for discharge: 22    Anticipated discharge services:  LCD  home with  and [facility] . Home with  and home care- PT, OT, ST, SN referrals made.  has been advised unsure if home care will deem her appropriate for services. Aside from that, it will be the  taking care of her. He has been educated that she needs additional assistance, but he states he does not have the funds to provide that. She will be discharging  as she exhausts her Medicare benefit that day.  We have educated the  extensively on this being an unsafe dc, but he is in denial and unwilling to accept that he is not able to take care of her. DME at home- hover round, BSC, rollator, cane, reacher, sock aid, LH shoehorn, lift chair (pt sleeps in chair)                    Next IDT Planned Review: /a- discharging home 11/23    Future Appointments   Date Time Provider Katy Booker   11/30/2022 10:00 AM MAHAD Young - CNP [facility]   12/6/2022  1:30 PM MD Martha Woods [facility]       SN Notes:   Diet: - Tube Feedings:  Jevity  Wounds: buttocks and coccyx Stage 1 nonblanchable erythema or discoloration  Medications: Other: facility  Other:    Post-acute CC Notes: discharge planned 11/23- Sheba Rivera will follow up at home 11/25. Writer has outreached to [employer] about high level of DC needs once she goes home.  Will continue to follow at home     Boris Solano RN

## 2022-11-23 ENCOUNTER — APPOINTMENT (OUTPATIENT)
Dept: GENERAL RADIOLOGY | Age: 82
End: 2022-11-23
Payer: MEDICARE

## 2022-11-23 ENCOUNTER — HOSPITAL ENCOUNTER (EMERGENCY)
Age: 82
Discharge: HOME | End: 2022-11-23
Attending: EMERGENCY MEDICINE
Payer: MEDICARE

## 2022-11-23 VITALS
SYSTOLIC BLOOD PRESSURE: 139 MMHG | HEART RATE: 71 BPM | DIASTOLIC BLOOD PRESSURE: 54 MMHG | RESPIRATION RATE: 18 BRPM | BODY MASS INDEX: 39.99 KG/M2 | OXYGEN SATURATION: 95 % | HEIGHT: 65 IN | TEMPERATURE: 97.5 F | WEIGHT: 240 LBS

## 2022-11-23 DIAGNOSIS — K94.20 COMPLICATION OF GASTROSTOMY TUBE (HCC): Primary | ICD-10-CM

## 2022-11-23 PROCEDURE — 99283 EMERGENCY DEPT VISIT LOW MDM: CPT

## 2022-11-23 PROCEDURE — 74018 RADEX ABDOMEN 1 VIEW: CPT

## 2022-11-23 ASSESSMENT — PAIN - FUNCTIONAL ASSESSMENT: PAIN_FUNCTIONAL_ASSESSMENT: NONE - DENIES PAIN

## 2022-11-23 NOTE — ED PROVIDER NOTES
I independently examined and evaluated Lee Adams. In brief, patient is a [de-identified] who is brought in for G tube that was dislodged. Reports she was at a nursing home following hospitalization and just got discharged home today. Accidentally pulled out the tube. Patient and family did not have any other concerns. Unable to place G tube. Instead, she had a smaller sized archuleta placed. Confirmed placement with port peg. Discharge. All diagnostic, treatment, and disposition decisions were made by myself in conjunction with the advanced practice provider/resident physician. I personally saw the patient and performed a substantive portion of the visit including aspects of the medical decision making. Comment: Please note this report has been produced using speech recognition software and may contain errors related to that system including errors in grammar, punctuation, and spelling, as well as words and phrases that may be inappropriate. If there are any questions or concerns please feel free to contact the dictating provider for clarification. For all further details of the patient's emergency department visit, please see the advanced practice provider's documentation.         Erick Fuller MD  11/26/22 9815

## 2022-11-25 ENCOUNTER — CARE COORDINATION (OUTPATIENT)
Dept: CASE MANAGEMENT | Age: 82
End: 2022-11-25

## 2022-11-25 NOTE — CARE COORDINATION
[facility] Care Transitions Initial Follow Up Call    Call within 2 business days of discharge: Yes    Post Acute Coordinator  contacted the family by telephone to perform post hospital discharge assessment. Verified name and  with family as identifiers. Provided introduction to self, and explanation of the  Post Acute Coordinator   role. Patient: Russ Cline Patient : 1940   MRN: [id number]  Reason for Admission: PEG tube malfunction (clogged, pain and distension at site)   Discharge Date: 22 RARS: Readmission Risk Score: 19.6      Last Discharge  [address]       Date Complaint Diagnosis Description Type Department Provider     G Tube Complications Complication of gastrostomy tube [facility]) ED (DISCHARGE) [facility] ED Elis Hilliard MD            Was this an external facility discharge? Yes, 2022  Discharge Facility: [facility]     Challenges to be reviewed by the provider   Additional needs identified to be addressed with provider: Yes  medications-change meds to be via peg tube not oral   DME-need for new hospital bed   Discharge from SNF after 100 days                Method of communication with provider: chart routing. Call to pt  who states pt is doing alright. States she did have to go to ER  because the peg tube pulled out. She has a temporary one and he is calling on Monday to get this taken care of. He will also call PCP to schedule appt. He confirms appt with BETSY  and cardiology   He states home care PT came today and got pt to stand at side of bed. He informs tube feed is running well and confirms water flushes 100 mg every 2 hours (this has been increased because of labs showing some dehydration). Labs to be drawn next week   He confirms hospital bed delivery but inquires if this can be changed out for one that can rise and lower to make caring for her easier on him.  Writer messaged PCP and ACM about this  He states she does have some redness on her bottom but is keeping the area clean and using barrier cream and turning her every 2 hours. Agreeable to [facility] referral- message sent via staff message   Writer informs this is final (Post-Acute) phone call- v/u. Encouraged call writer/ CTC or providers if questions or concerns- v/u. Episode closed      Post Acute Coordinator  reviewed medical action plan and red flags with family who verbalized understanding. The family was given an opportunity to ask questions and does not have any further questions or concerns at this time. Were discharge instructions available to patient? Yes. Reviewed appropriate site of care based on symptoms and resources available to patient including: PCP  Specialist  Home health  When to call 911. The family agrees to contact the PCP office for questions related to their healthcare. Advance Care Planning:   Does patient have an Advance Directive: reviewed and current. Medication reconciliation was performed with family, who verbalizes understanding of administration of home medications. Medications reviewed, 1111F entered: N/A    Was patient discharged with a pulse oximeter? no    Non-face-to-face services provided:  Scheduled appointment with PCP- calling to schedule   Scheduled appointment with Specialist-confirms appt with BETSY 11/30 and cardiology 12/6.  He will call to schedule for new peg tube and PCP   Obtained and reviewed discharge summary and/or continuity of care documents  Reviewed and followed up on pending diagnostic tests and treatments-labs to be drawn next week per [facility].   Communication with home health agencies or other community services the patient is currently using-SOC today   Assessment and support for treatment adherence and medication management-meds reviewed- all through peg tube     Offered patient enrollment in the Remote Patient Monitoring (RPM) program for in-home monitoring: NA.    Care Transitions 24 Hour Call    Do you have any ongoing symptoms?: No  Were you discharged with any Home Care or Post Acute Services: Yes  Post Acute Services: Home Health (Comment: [facility])  Patient DME: Shower chair, Geovanni Rosin, Wheelchair, Straight cane, Crutches, Other  Other Patient DME: grab bar in shower and around toilet, long handled sponge  Patient Home Equipment: Nebulizer, BiPAP, Oxygen  Do you have support at home?: Partner/Spouse/SO  Are you an active caregiver in your home?: No  Care Transitions Interventions         Follow Up  Future Appointments   Date Time Provider Katy Booker   11/30/2022 10:00 AM MAHAD Felipe CNP [facility]   12/6/2022  1:30 PM MD Vinay Aranda [facility] Acute Coordinator  provided contact information. No further follow-up call indicated based on severity of symptoms and risk factors.   Plan for next call: referral to ambulatory care manager-this has been sent previously with reminder today to [facility] via staff message     Galilea Olguin RN '

## 2022-11-28 ENCOUNTER — CARE COORDINATION (OUTPATIENT)
Dept: CARE COORDINATION | Age: 82
End: 2022-11-28

## 2022-11-28 ENCOUNTER — TELEPHONE (OUTPATIENT)
Dept: FAMILY MEDICINE CLINIC | Age: 82
End: 2022-11-28

## 2022-11-28 ENCOUNTER — TELEPHONE (OUTPATIENT)
Dept: SURGERY | Age: 82
End: 2022-11-28

## 2022-11-28 DIAGNOSIS — J44.9 CHRONIC OBSTRUCTIVE PULMONARY DISEASE, UNSPECIFIED COPD TYPE (HCC): ICD-10-CM

## 2022-11-28 DIAGNOSIS — E11.22 TYPE 2 DIABETES MELLITUS WITH STAGE 4 CHRONIC KIDNEY DISEASE, WITHOUT LONG-TERM CURRENT USE OF INSULIN (HCC): ICD-10-CM

## 2022-11-28 DIAGNOSIS — N18.4 TYPE 2 DIABETES MELLITUS WITH STAGE 4 CHRONIC KIDNEY DISEASE, WITHOUT LONG-TERM CURRENT USE OF INSULIN (HCC): ICD-10-CM

## 2022-11-28 DIAGNOSIS — I10 ESSENTIAL HYPERTENSION: Primary | ICD-10-CM

## 2022-11-28 NOTE — CARE COORDINATION
Ambulatory Care Coordination Note  11/28/2022    ACC: Marty Collado RN    ACM completed follow up call with patient spouse Dayana Thomas who said patient was doing well. Dayana Thomas did say he was not able to make Wednesday AM appt that was offered by MANUEL Wilcox d/t a previous appt already scheduled. Dayana Thomas said he would be able to see PCP in a week. [facility] was able to get information on [address] line where hospital bed was ordered from. [facility] will call to see what all information is needed for upcoming appt. [facility] offered RPM to patient and Dayana Thomas agreed. [facility] placed call to [facility] who said that nothing else was needed from provider. [facility] will call patient spouse Dayana Thomas to discuss the policy of exchanging beds. Plan:    Route note to PCP      Offered patient enrollment in the Remote Patient Monitoring (RPM) program for in-home monitoring: Yes, patient enrolled. Ambulatory Care Coordination Assessment    Care Coordination Protocol  Referral from Primary Care Provider: No  Week 1 - Initial Assessment     Barriers to medication adherence: None  Are you able to afford your medications?: Yes  How often do you have trouble taking your medications the way you have been told to take them?: I always take them as prescribed. Do you have Home O2 Therapy?: Yes   Oxygen Regimen:  At night/Sleep Flow - Enter rate/FIO2: 2   Method of Delivery: Concentrater   CPAP Use: BiPAP      Ability to seek help/take action for Emergent Urgent situations i.e. fire, crime, inclement weather or health crisis.: Needs Assistance  Ability to ambulate to restroom: Needs Assistance  Ability handle personal hygeine needs (bathing/dressing/grooming): Needs Assistance  Ability to manage Medications: Needs Assistance  Ability to prepare Food Preparation: Dependent  Ability to maintain home (clean home, laundry): Dependent  Ability to drive and/or has transportation: Dependent  Ability to do shopping: Needs Assistance  Ability to manage finances: Dependent  Is patient able to live independently?: No     Current Housing: Private Residence           Frequent urination at night?: Yes  Do you use rails/bars?: Yes  Do you have a non-slip tub mat?: Yes     Are you experiencing loss of meaning?: No  Are you experiencing loss of hope and peace?: No     Suggested Interventions and [address]: In Process (Comment: [facility])   Senior Services: In Process   Zone Management Tools: In Process         Set up/Review an Education Plan, Set up/Review Goals              Prior to Admission medications    Medication Sig Start Date End Date Taking?  Authorizing Provider   acetaminophen (TYLENOL) 325 MG tablet Take 650 mg by mouth every 6 hours as needed for Pain    Historical Provider, MD   amLODIPine (NORVASC) 5 MG tablet Take 5 mg by mouth daily    Historical Provider, MD   budesonide (PULMICORT) 0.25 MG/2ML nebulizer suspension Take 1 ampule by nebulization 2 times daily    Historical Provider, MD   carvedilol (COREG) 12.5 MG tablet Take 12.5 mg by mouth 2 times daily (with meals)    Historical Provider, MD   cloNIDine (CATAPRES) 0.1 MG tablet Take 0.1 mg by mouth 2 times daily    Historical Provider, MD   formoterol (PERFOROMIST) 20 MCG/2ML nebulizer solution Take 20 mcg by nebulization every 12 hours    Historical Provider, MD   guaiFENesin (ROBITUSSIN) 100 MG/5ML SOLN oral solution Take 200 mg by mouth every 6 hours as needed for Cough    Historical Provider, MD   ferrous sulfate (IRON 325) 325 (65 Fe) MG tablet Take 325 mg by mouth daily (with breakfast)    Historical Provider, MD   escitalopram (LEXAPRO) 10 MG tablet Take 10 mg by mouth daily    Historical Provider, MD   scopolamine (TRANSDERM-SCOP) transdermal patch Place 1 patch onto the skin every 72 hours    Historical Provider, MD   senna-docusate (PERICOLACE) 8.6-50 MG per tablet Take 1 tablet by mouth in the morning and at bedtime Historical Provider, MD   simethicone (MYLICON) 40 EK/6.4IO LIQD drops Take 40 mg by mouth in the morning, at noon, and at bedtime    Historical Provider, MD   Lift Chair [address] [facility] by Does not apply route 8/15/22   MAHAD Hermosillo - CNP   furosemide (LASIX) 40 MG tablet Take 1 tablet by mouth in the morning. 7/19/22   Vaishnavi Gabriel MD   acetylcysteine (MUCOMYST) 20 % nebulizer solution Inhale 3 mLs into the lungs 2 times daily 7/19/22   Vaishnavi Gabriel MD   montelukast (SINGULAIR) 10 MG tablet TAKE 1 TABLET EVERY DAY 5/26/22   MAHAD Hermosillo - CNP   apixaban (ELIQUIS) 2.5 MG TABS tablet Take 1 tablet by mouth 2 times daily 5/24/22   Peggy Gamino MD   valsartan (DIOVAN) 80 MG tablet Take 1 tablet by mouth daily 5/24/22 7/19/22  Peggy Gamino MD   NIFEdipine (PROCARDIA XL) 90 MG extended release tablet TAKE 1 TABLET EVERY DAY 3/2/22 7/19/22  MAHAD Humphrey - CNP   atorvastatin (LIPITOR) 40 MG tablet TAKE 1 AND 1/2 TABLETS EVERY DAY 11/4/21   Peggy Gamino MD   propafenone (RYTHMOL) 150 MG tablet TAKE 1 TABLET BY MOUTH EVERY 8 HOURS 11/3/21   Naya Mejia MD   carbidopa-levodopa (SINEMET)  MG per tablet Take 1 tablet by mouth 4 times daily    Historical Provider, MD   lamoTRIgine (LAMICTAL) 25 MG tablet Take 50 mg by mouth 2 times daily     Historical Provider, MD   Handicap Placard MISC by Does not apply route DX: Breanna Goes  parkinson's disease  Exp: 9/1/2024 9/27/19   BIJAN Wagner Junior   Multiple Vitamins-Minerals (THERAPEUTIC MULTIVITAMIN-MINERALS) tablet Take 1 tablet by mouth daily    Historical Provider, MD   solifenacin (VESICARE) 5 MG tablet Take 1 tablet by mouth daily.  6/25/13 10/29/13  Jesu Franco MD       Future Appointments   Date Time Provider Katy Booker   11/29/2022 12:00 PM [facility]   11/30/2022 10:00 AM MAHAD Mccain CNP [facility] SLEEP [facility]   12/6/2022  1:30 PM MD Bartolo Orozco

## 2022-11-28 NOTE — TELEPHONE ENCOUNTER
I called and spoke to  Carina Smith he stated he had to take her to ED and they put in a temporary (cath) in place so she needs a new feeding tube placed, is this an office appt? Please advise.

## 2022-11-28 NOTE — TELEPHONE ENCOUNTER
Please see if she is able to come see me this Wednesday at 8 am. I can do her HFU and address the hospital bed.      Thanks

## 2022-11-28 NOTE — CARE COORDINATION
Remote Patient Monitoring Enrollment Note      Date/Time:  11/28/2022 2:41 PM    Offered patient enrollment in the [address] [place] [address] Remote Patient Monitoring (RPM) program for in home monitoring for Kidney Disease , CHF, COPD, Diabetes, and HTN. Patient accepted RPM services. Patient will be monitoring the following daily:  blood pressure reading  blood pressure heart rate  pulse ox reading  pulse ox heart rate  survey response    ACM reviewed the information below with patient:    Emergency Contact (name and contact number): Victoriano Mcgee [phone]    [x] A member from the care coordination team will reach out to notify the patient once the RPM kit is ordered. [x] Once the kit is delivered, the [facility] team will contact the patient after UPS deliver to assist with set up. [x] Determined BP cuff size: regular (9.05\"-15.74\")      [x] Determined weight scale: regular (<330lbs)                                                 [x] Hours of ACM monitoring - Monday-Friday 9821-2588                         All questions about RPM program answered at this time.         Order request for RPM     Market: [place]  Kit requested: complete kit   Care Plan preset: Kidney Disease , COPD, Diabetes, and HTN  BP cuff size requested: regular (9.05\"-15.74\")  Weight Scale requested: regular (<330lbs)

## 2022-11-28 NOTE — CARE COORDINATION
Remote Patient Kit Ordering Note      Date/Time:  11/28/2022 3:11 PM      [x] [facility] confirmed patient shipping address  [x] Patient will receive package over the next 2-4 business days. Someone 21 years or older must be present to sign for UPS delivery. [x] Patient to contact virtual installation-specific phone number listed in the patient instructions. [x] If the patient does not contact HRS within 24 hours, an 1-4 All[employer] [facility] will call the patient directly: If the patient does not answer, HRS will follow up with the clinical team notifying them about the unsuccessful attempt to contact the patient. HRS will make three call attempts to the patient. [x] LPN will contact patient once equipment is active to welcome them to the program.                                                         [x] Hours of RPM monitoring - Monday-Friday [id number]                     All questions answered at this time. ACM made aware the RPM kit has been ordered. [facility]SS notified patient of RPM equipment order.

## 2022-11-28 NOTE — PROGRESS NOTES
11/28/22 3:41 PM       Remote Patient Monitoring Treatment Plan    Received request from ACKIRSTIN/KRUPA Brooks RN to order remote patient monitoring for in home monitoring of Kidney Disease , COPD, Diabetes, and HTN and order completed. Patient will be monitoring blood pressure   Blood glucose  pulse ox   weight  survey questions daily. Patient will engage in Remote Patient Monitoring each day to develop the skills necessary for self management. RPM Care Team Responsibilities:   Alerts will be reviewed daily and addressed within 2-4 hours during operational hours (Monday -Friday 9 am-4 pm)  Alert response and intervention documented in patient medical record  Alert response escalated to PCP per protocol and documented in patient medical record  Patient monitored over approximately  days  Discharge from program based on self-management readiness    See care coordination encounters for additional details.       Keiry Joya DNP, FNP-C, Remote Patient Monitoring NP, () [phone]

## 2022-11-28 NOTE — TELEPHONE ENCOUNTER
Salima Thomas, to see if she would be willing to come in on Wednesday, to discuss the hospital fallow up and a hospital bed. When I spoke to her she informed me that they had already been contacted and the hospital bed would be deliver soon to their house. The patient stated that they would not need the appointment.

## 2022-11-28 NOTE — TELEPHONE ENCOUNTER
I called and spoke with  J- tube replacement arrive tomorrow at 11:30 advised no tube feeds in the morning, directions given, pt agrees.

## 2022-11-28 NOTE — TELEPHONE ENCOUNTER
Pt's  'Allen Ojeda' called and said that his wife had a temporary feed tube put in last Wed 11/23 in [facility]. He said he was told to call and schedule her for it to be replaced. He said that one of our Dr's put one in back in October of this year. I don't see that in her chart. I do see where Dr. Curtis Chung put a PEG tube in 7/15/22. Please look and see if Dr. Curtis Chung is the one to replace this and call Allen Ojeda. Thank you so much!

## 2022-11-29 ENCOUNTER — TELEPHONE (OUTPATIENT)
Dept: FAMILY MEDICINE CLINIC | Age: 82
End: 2022-11-29

## 2022-11-29 ENCOUNTER — HOSPITAL ENCOUNTER (OUTPATIENT)
Dept: ENDOSCOPY | Age: 82
Setting detail: OUTPATIENT SURGERY
Discharge: HOME | End: 2022-11-29
Admitting: SURGERY
Payer: MEDICARE

## 2022-11-29 PROCEDURE — 3600000032 HC G TUBE REPLACEMENT

## 2022-11-29 PROCEDURE — 2709999900 HC NON-CHARGEABLE SUPPLY

## 2022-11-29 NOTE — OP NOTE
[address]                 [address] [address]                                OPERATIVE REPORT    PATIENT NAME: Real Castorena                    :        1940  MED REC NO:   [id number]                          ROOM:  ACCOUNT NO:   [de-identified]                           ADMIT DATE: 2022  PROVIDER:     Yesica Boo. MD Ebony    DATE OF PROCEDURE:  2022    PREOPERATIVE DIAGNOSIS:  Gastrostomy tube dysfunction. POSTOPERATIVE DIAGNOSIS:  Gastrostomy tube dysfunction. OPERATION PERFORMED:  Gastrostomy tube change. ANESTHESIA:  None. SURGEON:  Valencia Beck MD    ESTIMATED BLOOD LOSS:  Minimal.    INDICATIONS:  The patient is an 74-year-old woman who had her  gastrostomy tube pulled out. This was replaced with a Zimmerman catheter in  the ER, but it is not working well. She is brought in for exchange. OPERATIVE SUMMARY:  After preoperative evaluation, the patient was  brought in the endoscopy area and placed in a comfortable supine  position on the stretcher. Visceral balloon was deflated and a  gastrostomy was removed. A new 20-[place]  REJI gastrostomy tube was  placed. The balloon was inflated. It was pulled up snugly against the  abdominal wall. She tolerated the procedure well and was discharged in  good condition.         Michael Hewitt MD    D: 2022 12:19:09       T: 2022 12:23:01     [email or web address]  Job#: [id number]     Doc#: [id number]    CC:

## 2022-11-29 NOTE — TELEPHONE ENCOUNTER
Incoming call from spouse Sekou Galeana, he states needing to schedule Hospital follow up, no available appointments found. I placed call on hold to speak with one of the medical assistance that called earlier today( see care coordination note 11/25) and when I returned the call was disconnected. Please advise on scheduling hospital follow up for refills on medications. Due to care coordinator note no longer need to discuss bed.

## 2022-11-29 NOTE — TELEPHONE ENCOUNTER
Please advise if you would like to double book this at 8am or another day that would work, thank you Carmen García

## 2022-11-30 ENCOUNTER — TELEPHONE (OUTPATIENT)
Dept: OTHER | Facility: CLINIC | Age: 82
End: 2022-11-30

## 2022-11-30 ENCOUNTER — APPOINTMENT (OUTPATIENT)
Dept: GENERAL RADIOLOGY | Age: 82
DRG: 682 | End: 2022-11-30
Payer: MEDICARE

## 2022-11-30 ENCOUNTER — HOSPITAL ENCOUNTER (EMERGENCY)
Age: 82
Discharge: HOME | DRG: 682 | End: 2022-11-30
Attending: EMERGENCY MEDICINE
Payer: MEDICARE

## 2022-11-30 ENCOUNTER — TELEPHONE (OUTPATIENT)
Dept: SLEEP MEDICINE | Age: 82
End: 2022-11-30

## 2022-11-30 VITALS
WEIGHT: 240 LBS | RESPIRATION RATE: 16 BRPM | TEMPERATURE: 97.8 F | BODY MASS INDEX: 38.57 KG/M2 | DIASTOLIC BLOOD PRESSURE: 68 MMHG | OXYGEN SATURATION: 94 % | HEIGHT: 66 IN | SYSTOLIC BLOOD PRESSURE: 140 MMHG | HEART RATE: 62 BPM

## 2022-11-30 DIAGNOSIS — K94.23 GASTROSTOMY TUBE DYSFUNCTION (HCC): Primary | ICD-10-CM

## 2022-11-30 PROCEDURE — 74018 RADEX ABDOMEN 1 VIEW: CPT

## 2022-11-30 PROCEDURE — 0D20XUZ CHANGE FEEDING DEVICE IN UPPER INTESTINAL TRACT, EXTERNAL APPROACH: ICD-10-PCS | Performed by: EMERGENCY MEDICINE

## 2022-11-30 PROCEDURE — 99283 EMERGENCY DEPT VISIT LOW MDM: CPT

## 2022-11-30 PROCEDURE — 43762 RPLC GTUBE NO REVJ TRC: CPT

## 2022-11-30 ASSESSMENT — PAIN - FUNCTIONAL ASSESSMENT: PAIN_FUNCTIONAL_ASSESSMENT: NONE - DENIES PAIN

## 2022-11-30 NOTE — ED PROVIDER NOTES
[facility] Emergency Department      CHIEF COMPLAINT  Feeding Tube Problem (Patients  states feeding tube was put in yesterday. Feeding tube pulled completely out this morning while moving the patient out of the bed. )      HISTORY OF PRESENT ILLNESS  Russ Cline is a [de-identified] y.o. female with a history of aspiration pneumonia with a G-tube in place for feeds presents after she accidentally pulled this out early this morning at about 7:30 AM.  This happened yesterday as well and she was seen in the ER but they had difficulty replacing it and placed a Zimmerman catheter instead. She was then taken to the OR with Dr. Eboni Booth and he replaced her G-tube. 20 Turkish REJI tube. .   No other complaints, modifying factors or associated symptoms. I have reviewed the following from the nursing documentation.     Past Medical History:   Diagnosis Date    Anemia, unspecified 2010    neg bone marrow    Asthma     Atrial fibrillation ([address]     Baker's cyst     Basal cell carcinoma of left cheek 3/12/2019    Blind left eye     Carotid artery stenoses     Carotid stenosis 2002    left    Cervical spinal cord compression ([address] 11/2010    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-sided sciatica 8/9/2016    CKD (chronic kidney disease) stage 3, GFR 30-59 ml/min ([facility])     Community acquired pneumonia of left lower lobe of lung 9/8/2018    CRI     Detached retina, left     Diverticulosis     DJD (degenerative joint disease)     Edema     chronic    Fatty liver     GERD (gastroesophageal reflux disease)     Hearing loss     Herniated lumbar intervertebral disc     Hx stage 2 sacral decubitus ulcer 4/13/2018    Hyperlipidemia     Hypertension     Hypothyroid 1/27/2015    Morbid obesity with BMI of 40.0-44.9, adult ([address] 2/10/2017    BETSY treated with BiPAP     Parkinson's disease ([address]     Partial symptomatic epilepsy with complex partial seizures, not intractable, without status epilepticus ([address] 1/24/2020    Restless leg syndrome     Restless legs syndrome     Rheumatic fever 1950    2x IN [place]    Sleep apnea     Tremor, essential 8/16/2010    Type 2 diabetes mellitus without complication ([facility]     Type II or unspecified type diabetes mellitus without mention of complication, not stated as uncontrolled      Past Surgical History:   Procedure Laterality Date    APPENDECTOMY      BRONCHOSCOPY N/A 7/7/2022    BRONCHOSCOPY ALVEOLAR LAVAGE performed by Owen Chaudhry MD at [address]  7/7/2022    BRONCHOSCOPY THERAPUTIC ASPIRATION INITIAL performed by Owen Chaudhry MD at [address]      left    CATARACT REMOVAL      CERVICAL FUSION  11/2010    CHOLECYSTECTOMY      COLONOSCOPY N/A 3/8/2020    COLONOSCOPY FLEXIBLE W/ DECOMPRESSION performed by Josiane Gage MD at [address] [facility] [address]  11/8/2022    GASTROSTOMY/PEG TUBE CHANGE performed by Ana Maria Lemus DO at [address] (CERVIX STATUS UNKNOWN)      IR MIDLINE CATH  10/8/2021    IR MIDLINE CATH 10/8/2021 [address] SPECIAL PROCEDURES    JOINT REPLACEMENT Bilateral 2003    KNEE SURGERY      replacement x 2    OVARY REMOVAL      SHOULDER SURGERY      TONSILLECTOMY      TRACHEOSTOMY N/A 7/15/2022    TRACHEOTOMY WITH PERCUTANEOUS ENDOSCOPIC GASTROSTOMY TUBE PLACEMENT performed by Derek Bobby MD at [address]  03/17/2020    Esophagitis    UPPER GASTROINTESTINAL ENDOSCOPY N/A 3/17/2020    EGD performed by Shannan German MD at [address] [facility]  11/8/2022    EGD ESOPHAGOGASTRODUODENOSCOPY performed by Ana Maria Lemus DO at [address]       Family History   Problem Relation Age of Onset    Diabetes Mother     Heart Disease Mother     Diabetes Father     Heart Disease Father     Diabetes Sister Social History     Socioeconomic History    Marital status:      Spouse name: Anny    Number of children: 2    Years of education: 12    Highest education level: Not on file   Occupational History    Not on file   Tobacco Use    Smoking status: Former    Smokeless tobacco: Never   Vaping Use    Vaping Use: Never used   Substance and Sexual Activity    Alcohol use: No    Drug use: No    Sexual activity: Not Currently   Other Topics Concern    Not on file   Social History Narrative    ** Merged History Encounter **          Social Determinants of Health     Financial Resource Strain: Not on file   Food Insecurity: Not on file   Transportation Needs: Not on file   Physical Activity: Not on file   Stress: Not on file   Social Connections: Not on file   Intimate Partner Violence: Not on file   Housing Stability: Not on file     No current facility-administered medications for this encounter.      Current Outpatient Medications   Medication Sig Dispense Refill    acetaminophen (TYLENOL) 325 MG tablet Take 650 mg by mouth every 6 hours as needed for Pain      amLODIPine (NORVASC) 5 MG tablet Take 5 mg by mouth daily      budesonide (PULMICORT) 0.25 MG/2ML nebulizer suspension Take 1 ampule by nebulization 2 times daily      carvedilol (COREG) 12.5 MG tablet Take 12.5 mg by mouth 2 times daily (with meals)      cloNIDine (CATAPRES) 0.1 MG tablet Take 0.1 mg by mouth 2 times daily      formoterol (PERFOROMIST) 20 MCG/2ML nebulizer solution Take 20 mcg by nebulization every 12 hours      guaiFENesin (ROBITUSSIN) 100 MG/5ML SOLN oral solution Take 200 mg by mouth every 6 hours as needed for Cough      ferrous sulfate (IRON 325) 325 (65 Fe) MG tablet Take 325 mg by mouth daily (with breakfast)      escitalopram (LEXAPRO) 10 MG tablet Take 10 mg by mouth daily      scopolamine (TRANSDERM-SCOP) transdermal patch Place 1 patch onto the skin every 72 hours      senna-docusate (PERICOLACE) 8.6-50 MG per tablet Take 1 tablet by mouth in the morning and at bedtime      simethicone (MYLICON) 40 PC/2.4BA LIQD drops Take 40 mg by mouth in the morning, at noon, and at bedtime      Lift Chair MISC by Does not apply route 1 each 0    furosemide (LASIX) 40 MG tablet Take 1 tablet by mouth in the morning. 45 tablet 1    acetylcysteine (MUCOMYST) 20 % nebulizer solution Inhale 3 mLs into the lungs 2 times daily 10 mL 0    montelukast (SINGULAIR) 10 MG tablet TAKE 1 TABLET EVERY DAY 90 tablet 1    apixaban (ELIQUIS) 2.5 MG TABS tablet Take 1 tablet by mouth 2 times daily 180 tablet 3    atorvastatin (LIPITOR) 40 MG tablet TAKE 1 AND 1/2 TABLETS EVERY  tablet 3    propafenone (RYTHMOL) 150 MG tablet TAKE 1 TABLET BY MOUTH EVERY 8 HOURS 270 tablet 3    carbidopa-levodopa (SINEMET)  MG per tablet Take 1 tablet by mouth 4 times daily      lamoTRIgine (LAMICTAL) 25 MG tablet Take 50 mg by mouth 2 times daily       Handicap Placard MISC by Does not apply route DX: G20  parkinson's disease  Exp: 9/1/2024 1 each 0    Multiple Vitamins-Minerals (THERAPEUTIC MULTIVITAMIN-MINERALS) tablet Take 1 tablet by mouth daily       Allergies   Allergen Reactions    Levaquin [Levofloxacin]      Pt was given in er and developed hives and redness    Metoclopramide     Phenazopyridine     Pyridium [Phenazopyridine Hcl]     Reglan [Metoclopramide Hcl]     Reglan [Metoclopramide Hcl]      tremors       REVIEW OF SYSTEMS      General:  No fevers  Eyes:  No recent vison changes  ENT:  No sore throat, no nasal congestion  Cardiovascular:  no palpitations  Respiratory:   no cough, no wheezing  Gastrointestinal:  No abdominal pain, no vomiting, no diarrhea.   G-tube fell out  Musculoskeletal:  No muscle pain, no joint pain  Skin:  No rash   Neurologic:  No speech problems, no headache, no extremity numbness, no extremity weakness  Genitourinary:  No dysuria  Extremities:  no edema, no pain      Unless otherwise stated in this report, this patient's positive and negative responses for review of systems (constitutional, eyes, ENT, cardiovascular, respiratory, gastrointestinal, neurological, genitourinary, musculoskeletal, integument systems and systems related to the presenting problem) are either stated in the preceding paragraph, were not pertinent or were negative for the symptoms and/or complaints related to the medical problem. PHYSICAL EXAM  BP (!) 140/68   Pulse 62   Temp 97.8 °F (36.6 °C) (Oral)   Resp 16   Ht 5' 6\" (1.676 m)   Wt 240 lb (108.9 kg)   SpO2 94%   BMI 38.74 kg/m²   GENERAL APPEARANCE: Awake and alert. Cooperative. No acute distress. Obese, chronically ill-appearing. HEAD: Normocephalic. Atraumatic. EYES: PERRL. EOM's grossly intact. ENT: Mucous membranes are moist.   NECK: Supple, trachea midline. HEART: RRR. LUNGS: Respirations unlabored. Garcia Sakshi Speaking comfortably in full sentences. ABDOMEN: Soft. Non-distended. Non-tender. No guarding or rebound. Dressing in the left upper quadrant at the site of gastrostomy tube. EXTREMITIES: No peripheral edema. MAEE. No acute deformities. SKIN: Warm, dry and intact. No acute rashes. NEUROLOGICAL: Alert and oriented X 3. PSYCHIATRIC: Normal mood and affect. LABS  I have reviewed all labs for this visit. No results found for this visit on 11/30/22. RADIOLOGY  X-RAYS: ALL IMAGES INCLUDING PLAIN FILMS, CT, ULTRASOUND AND MRI HAVE BEEN READ BY THE RADIOLOGIST. I have personally reviewed plain film images and have reviewed the radiology reports. XR ABDOMEN (KUB) (SINGLE AP VIEW)   Final Result   Normal, intraluminal position of the gastrostomy tube. Rechecks: Physical assessment performed. G-tube replaced as below without issue. Procedures: Gastrostomy tube replacement. Indication:G-tube dislodgment at home. The patient and her spouse gave verbal consent . She had the tube with her with the balloon intact.   The balloon was deflated and coated with lubricating jelly. Using gentle pressure it was easily reinserted. Balloon was reinflated with 10 cc of sterile water. It was pulled snugly to the abdominal wall and the bolster was reapplied. A dressing was applied. G-tube x-ray with Gastrografin was done which confirms placement. The patient tolerated the procedure well. Sepsis:  Is this patient to be included in the SEP-1 Core Measure due to severe sepsis or septic shock? No   Exclusion criteria - the patient is NOT to be included for SEP-1 Core Measure due to: Infection is not suspected           ED COURSE/MDM  Patient seen and evaluated. Here the patient is afebrile with normal vitals signs. Old records reviewed. I was able to replace the patient's G-tube here without difficulty. G-tube study with Gastrografin was done and confirms placement. Patient will be discharged home. Labs and imaging reviewed and results discussed with patient. Patient was reassessed as noted above . Plan of care discussed with patient. Patient in agreement with plan. Strict return precautions have been given. Patient was given scripts for the following medications. I counseled patient how to take these medications. Discharge Medication List as of 11/30/2022 11:10 AM        No prescriptions given. CLINICAL IMPRESSION  1. Gastrostomy tube dysfunction (HCC)        Blood pressure (!) 140/68, pulse 62, temperature 97.8 °F (36.6 °C), temperature source Oral, resp. rate 16, height 5' 6\" (1.676 m), weight 240 lb (108.9 kg), SpO2 94 %, not currently breastfeeding. DISPOSITION  Zuleyma Leon was discharged to home in stable condition. Kemi Cage MD am the primary clinician of record.     (Please note this note was completed with a voice recognition program.  Efforts were made to edit the dictations but occasionally words are mis-transcribed.)        Garrison Decker MD  11/30/22 0334

## 2022-11-30 NOTE — DISCHARGE INSTRUCTIONS
G-tube was replaced here in the ER and the x-ray confirms placement. Please follow-up with your primary doctor as needed.

## 2022-11-30 NOTE — TELEPHONE ENCOUNTER
Patient cancelled appointment on 11/30/2022 with Alex Pacheco for 1 year sleep f/u. Reason: pt is currently in ER     Patient did not reschedule appointment. Appointment rescheduled for pt's spouse stated he would call back to reschedule. Last OV 11/30/2021       Assessment:       Severe BETSY- BIPAP 22/15 cm H2O with 1 LO2 bleed in. Optimal compliance and efficacy on review today. Obesity  Snoring, hypersomnia- resolved with BIPAP     Plan:       - Continue BIPAP 22/15 cm H2O with 1 LO2 bleed in  -Heated tubing  -Disucssed recommended cleaning and replacement of BiPAP supplies  - Advised to use BiPAP 6-8 hrs at night and during naps. - Replacement of mask, tubing, head straps every 3-6 months or sooner if damaged. - Patient instructed to contact [employer] for any mask, tubing or machine trouble shooting if problems arise.  - Sleep hygiene  -Recommend no TV in bed  - Avoid sedatives, alcohol and caffeinated drinks at bed time. - Patient counseled to never drive or operate heavy machinery while fatigue, drowsy or sleepy.    - Weight loss is recommended as a long-term intervention.    - Complications of BETSY if not treated were discussed with patient patient, including: systemic hypertension, pulmonary hypertension, cardiovascular morbidities, car accidents and all cause mortality.  -Patient education provided regarding sleep tips and PAP cleaning recommendations      Follow up: 1 year, sooner if needed

## 2022-11-30 NOTE — TELEPHONE ENCOUNTER
Writer contacted ED provider to inform of 30 day readmission risk. ED provider informed writer of intention to discharge and follow up recommended.     Call Back: If you need to call back to inform of disposition you can contact me at [phone]

## 2022-11-30 NOTE — ED NOTES
Discharge instructions reviewed, patient and patient family verbalizes understanding. Denies questions/concerns at this time. Patient  out of ED in stable condition with all belongings.        Lashell Tovar RN  11/30/22 0556

## 2022-12-02 ENCOUNTER — OFFICE VISIT (OUTPATIENT)
Dept: FAMILY MEDICINE CLINIC | Age: 82
End: 2022-12-02

## 2022-12-02 ENCOUNTER — HOSPITAL ENCOUNTER (OUTPATIENT)
Dept: GENERAL RADIOLOGY | Age: 82
Discharge: HOME | End: 2022-12-02
Payer: MEDICARE

## 2022-12-02 ENCOUNTER — HOSPITAL ENCOUNTER (OUTPATIENT)
Age: 82
Discharge: HOME | End: 2022-12-02
Payer: MEDICARE

## 2022-12-02 VITALS — OXYGEN SATURATION: 94 % | HEART RATE: 61 BPM | SYSTOLIC BLOOD PRESSURE: 138 MMHG | DIASTOLIC BLOOD PRESSURE: 80 MMHG

## 2022-12-02 DIAGNOSIS — E66.01 MORBID OBESITY WITH BMI OF 40.0-44.9, ADULT (HCC): Chronic | ICD-10-CM

## 2022-12-02 DIAGNOSIS — K94.23 GASTROSTOMY TUBE DYSFUNCTION (HCC): ICD-10-CM

## 2022-12-02 DIAGNOSIS — I50.32 CHRONIC DIASTOLIC CONGESTIVE HEART FAILURE (HCC): ICD-10-CM

## 2022-12-02 DIAGNOSIS — G40.209 PARTIAL SYMPTOMATIC EPILEPSY WITH COMPLEX PARTIAL SEIZURES, NOT INTRACTABLE, WITHOUT STATUS EPILEPTICUS (HCC): ICD-10-CM

## 2022-12-02 DIAGNOSIS — N18.4 TYPE 2 DIABETES MELLITUS WITH STAGE 4 CHRONIC KIDNEY DISEASE, WITHOUT LONG-TERM CURRENT USE OF INSULIN (HCC): ICD-10-CM

## 2022-12-02 DIAGNOSIS — I48.0 PAF (PAROXYSMAL ATRIAL FIBRILLATION) (HCC): ICD-10-CM

## 2022-12-02 DIAGNOSIS — R06.02 SOB (SHORTNESS OF BREATH): ICD-10-CM

## 2022-12-02 DIAGNOSIS — E11.22 TYPE 2 DIABETES MELLITUS WITH STAGE 4 CHRONIC KIDNEY DISEASE, WITHOUT LONG-TERM CURRENT USE OF INSULIN (HCC): ICD-10-CM

## 2022-12-02 DIAGNOSIS — R09.89 RHONCHI AT BOTH LUNG BASES: Primary | ICD-10-CM

## 2022-12-02 DIAGNOSIS — J44.9 CHRONIC OBSTRUCTIVE PULMONARY DISEASE, UNSPECIFIED COPD TYPE (HCC): ICD-10-CM

## 2022-12-02 DIAGNOSIS — G20 PARKINSON DISEASE (HCC): ICD-10-CM

## 2022-12-02 PROCEDURE — 71046 X-RAY EXAM CHEST 2 VIEWS: CPT

## 2022-12-02 RX ORDER — SCOLOPAMINE TRANSDERMAL SYSTEM 1 MG/1
1 PATCH, EXTENDED RELEASE TRANSDERMAL
Qty: 10 PATCH | Refills: 0 | Status: ON HOLD | OUTPATIENT
Start: 2022-12-02 | End: 2023-01-01

## 2022-12-02 RX ORDER — CARVEDILOL 12.5 MG/1
12.5 TABLET ORAL 2 TIMES DAILY WITH MEALS
Qty: 60 TABLET | Refills: 2 | Status: ON HOLD | OUTPATIENT
Start: 2022-12-02

## 2022-12-02 RX ORDER — FUROSEMIDE 40 MG/1
40 TABLET ORAL DAILY
Qty: 90 TABLET | Refills: 1 | Status: ON HOLD | OUTPATIENT
Start: 2022-12-02

## 2022-12-02 RX ORDER — CARVEDILOL 12.5 MG/1
12.5 TABLET ORAL 2 TIMES DAILY WITH MEALS
Qty: 60 TABLET | Refills: 2 | Status: SHIPPED | OUTPATIENT
Start: 2022-12-02 | End: 2022-12-02

## 2022-12-02 RX ORDER — CLONIDINE HYDROCHLORIDE 0.1 MG/1
0.1 TABLET ORAL 2 TIMES DAILY
Qty: 180 TABLET | Refills: 2 | Status: ON HOLD | OUTPATIENT
Start: 2022-12-02

## 2022-12-02 RX ORDER — CLONIDINE HYDROCHLORIDE 0.1 MG/1
0.1 TABLET ORAL 2 TIMES DAILY
Qty: 180 TABLET | Refills: 2 | Status: SHIPPED | OUTPATIENT
Start: 2022-12-02 | End: 2022-12-02

## 2022-12-02 RX ORDER — ESCITALOPRAM OXALATE 10 MG/1
10 TABLET ORAL DAILY
Qty: 90 TABLET | Refills: 2 | Status: ON HOLD | OUTPATIENT
Start: 2022-12-02

## 2022-12-02 RX ORDER — AMLODIPINE BESYLATE 5 MG/1
5 TABLET ORAL DAILY
Qty: 90 TABLET | Refills: 2 | Status: ON HOLD | OUTPATIENT
Start: 2022-12-02

## 2022-12-02 RX ORDER — PROPAFENONE HYDROCHLORIDE 150 MG/1
150 TABLET, FILM COATED ORAL EVERY 8 HOURS SCHEDULED
Qty: 270 TABLET | Refills: 3 | Status: SHIPPED | OUTPATIENT
Start: 2022-12-02 | End: 2022-12-02

## 2022-12-02 RX ORDER — FERROUS SULFATE 325(65) MG
325 TABLET ORAL
Qty: 90 TABLET | Refills: 1 | Status: SHIPPED | OUTPATIENT
Start: 2022-12-02 | End: 2022-12-02

## 2022-12-02 RX ORDER — CETIRIZINE HYDROCHLORIDE 10 MG/1
10 TABLET ORAL DAILY
Qty: 90 TABLET | Refills: 2 | Status: ON HOLD | OUTPATIENT
Start: 2022-12-02 | End: 2023-01-01

## 2022-12-02 RX ORDER — FERROUS SULFATE 325(65) MG
325 TABLET ORAL
Qty: 90 TABLET | Refills: 1 | Status: ON HOLD | OUTPATIENT
Start: 2022-12-02

## 2022-12-02 RX ORDER — MONTELUKAST SODIUM 10 MG/1
TABLET ORAL
Qty: 90 TABLET | Refills: 1 | Status: SHIPPED | OUTPATIENT
Start: 2022-12-02 | End: 2022-12-02

## 2022-12-02 RX ORDER — LAMOTRIGINE 25 MG/1
50 TABLET ORAL 2 TIMES DAILY
Qty: 30 TABLET | Refills: 2 | Status: SHIPPED | OUTPATIENT
Start: 2022-12-02 | End: 2022-12-02

## 2022-12-02 RX ORDER — ATORVASTATIN CALCIUM 40 MG/1
60 TABLET, FILM COATED ORAL DAILY
Qty: 135 TABLET | Refills: 3 | Status: ON HOLD | OUTPATIENT
Start: 2022-12-02

## 2022-12-02 RX ORDER — FORMOTEROL FUMARATE 20 UG/2ML
20 SOLUTION RESPIRATORY (INHALATION) EVERY 12 HOURS SCHEDULED
Qty: 120 ML | Refills: 0 | Status: ON HOLD | OUTPATIENT
Start: 2022-12-02 | End: 2023-01-01

## 2022-12-02 RX ORDER — ESCITALOPRAM OXALATE 10 MG/1
10 TABLET ORAL DAILY
Qty: 90 TABLET | Refills: 2 | Status: SHIPPED | OUTPATIENT
Start: 2022-12-02 | End: 2022-12-02

## 2022-12-02 RX ORDER — CETIRIZINE HYDROCHLORIDE 10 MG/1
10 TABLET ORAL DAILY
Qty: 90 TABLET | Refills: 2 | Status: SHIPPED | OUTPATIENT
Start: 2022-12-02 | End: 2022-12-02

## 2022-12-02 RX ORDER — LAMOTRIGINE 25 MG/1
50 TABLET ORAL 2 TIMES DAILY
Qty: 30 TABLET | Refills: 2 | Status: ON HOLD | OUTPATIENT
Start: 2022-12-02

## 2022-12-02 RX ORDER — PROPAFENONE HYDROCHLORIDE 150 MG/1
150 TABLET, FILM COATED ORAL EVERY 8 HOURS SCHEDULED
Qty: 270 TABLET | Refills: 3 | Status: ON HOLD | OUTPATIENT
Start: 2022-12-02

## 2022-12-02 RX ORDER — BUDESONIDE 0.25 MG/2ML
1 INHALANT ORAL 2 TIMES DAILY
Qty: 60 EACH | Refills: 2 | Status: SHIPPED | OUTPATIENT
Start: 2022-12-02 | End: 2022-12-02 | Stop reason: SDUPTHER

## 2022-12-02 RX ORDER — MONTELUKAST SODIUM 10 MG/1
10 TABLET ORAL DAILY
Qty: 90 TABLET | Refills: 1 | Status: ON HOLD | OUTPATIENT
Start: 2022-12-02

## 2022-12-02 RX ORDER — ATORVASTATIN CALCIUM 40 MG/1
TABLET, FILM COATED ORAL
Qty: 135 TABLET | Refills: 3 | Status: SHIPPED | OUTPATIENT
Start: 2022-12-02 | End: 2022-12-02

## 2022-12-02 RX ORDER — AMLODIPINE BESYLATE 5 MG/1
5 TABLET ORAL DAILY
Qty: 90 TABLET | Refills: 2 | Status: SHIPPED | OUTPATIENT
Start: 2022-12-02 | End: 2022-12-02

## 2022-12-02 RX ORDER — FUROSEMIDE 40 MG/1
40 TABLET ORAL DAILY
Qty: 90 TABLET | Refills: 1 | Status: SHIPPED | OUTPATIENT
Start: 2022-12-02 | End: 2022-12-02

## 2022-12-02 RX ORDER — BUDESONIDE 0.25 MG/2ML
1 INHALANT ORAL 2 TIMES DAILY
Qty: 60 EACH | Refills: 2 | Status: ON HOLD | OUTPATIENT
Start: 2022-12-02

## 2022-12-02 NOTE — PROGRESS NOTES
2022  Zuleyma Leon (: 1940)  [de-identified] y.o.    ASSESSMENT and PLAN:  Fred Laird was seen today for ed follow-up. Diagnoses and all orders for this visit:    Rhonchi at both lung bases  -Chest xray to rule out pne/FVO  -Bilateral edema, increased RR-highly recommend NPO status until further evaluation. -oral care multiple times a day. -High suspicion for aspiration pne. No recent fevers, mental status changes.   -low threshold for ER,  aware.   -update labs r/o anemia, infection, electrolyte imbalance, FVO. -reviewed s/sx of aspiration, and silent aspiration, reviewed importance of awaiting clearance from Speech due to increase risk.   -Alarm symptoms discussed-increased sob, increased RR, fever, spo2 <90%, increased secretions, mental status change-go to ER. PAF (paroxysmal atrial fibrillation) ([facility])  -     Comprehensive Metabolic Panel; Future  -     CBC with Auto Differential; Future  -     Brain Natriuretic Peptide; Future  -     Magnesium; Future-  -on eliquis, anemic, no active s/sx of bleeding.   -has f/u with Cardiology next week. Chronic obstructive pulmonary disease, unspecified COPD type ([facility]  -     Comprehensive Metabolic Panel; Future  -     CBC with Auto Differential; Future  -     Brain Natriuretic Peptide; Future  -     Magnesium; Future  -update labs. -f/u with pulm. -RR 20-24-not requiring o2. SpO2 >90%. Type 2 diabetes mellitus with stage 4 chronic kidney disease, without long-term current use of insulin ([facility])    Partial symptomatic epilepsy with complex partial seizures, not intractable, without status epilepticus ([facility]  -no recent seizures. -stable, continue to monitor. SOB (shortness of breath)  -     XR CHEST STANDARD (2 VW); Future  -     Comprehensive Metabolic Panel; Future  -     CBC with Auto Differential; Future  -     Brain Natriuretic Peptide; Future  -     Magnesium; Future  -Chest xray to rule out pne/FVO.   -Bilateral edema, increased RR-highly encouraged NPO status until further evaluation. High suspicion for aspiration pne. No recent fevers, mental status changes. Spo2 >90% at home.   -low threshold for ER,  aware.   -update labs r/o anemia, infection, electrolyte imbalance, FVO. -reviewed s/sx of aspiration, and silent aspiration, reviewed importance of awaiting clearance from Speech due to increase risk.   -check vital signs daily.   -Alarm symptoms discussed-increased sob, RR, spo2 <90%, increased secretions, mental status change-go to ER. Chronic diastolic congestive heart failure ([facility]  -update labs. -unable to obtain weight today.   -having difficultly getting weights at home due to weakness, hospital bed does not have scale on it. Parkinson disease ([facility]  -stable, continue to monitor. Morbid obesity with BMI of 40.0-44.9, adult ([address]  -administering feeds via g-tube. G-Tube Dysfunction  -Dysfunction resolved after last exchange.   -monitor site daily, erythema surrounding site, no purulent discharge, non tender. Per  site is improving, redness related to trauma pulling tube out. Dressing c/d/I.   -updated all meds to be given via g-tube, recommend  speak with pharmacist to ensure all formulations of meds given a g-tube compatible and able to be crushed.   -reviewed gtube care, medication administration with HOB >30 degrees. -reviewed oral care, skin care instructions. Return in about 1 month (around 1/2/2023), or if symptoms worsen or fail to improve. HPI    Presenting for f/u after recent discharge from [place] campus. Initially was admitted in June for hypoxia-requiring ventilation/trach. [facility] then to [facility]. Doing fair since returning home.  is primary care giver. Working with PT/OT, and has home RN following. Speech therapy is coming next week. Has G-tube, working well, had it accidentally pulled out and replaced.  Pt is receiving feedings by tube feed. Working appropriately since recent replacement.  is administering meds and tube feeding. Last Chest xray- 1 week before she left snf. Pt tried tomato soup and cream of wheat since being home, although unclear if that was cleared from Speech therapy. Healing Trach stoma, denies pain/tenderness. Scant drainage from site, clear, no tenderness, or pain. Increase in SOB since discharge. Gradual onset. Seems to have worsened after oral intake. Keeps HOB elevated during tube feedings, med admin. If bed flat is holding feeds appropriately. Denies recent ill contacts. Having soft daily BMs, no blood or black tarry stools. Denies cp, palpitations, dizziness; denies fevers, chills, body aches. Pt is very weak. Received hospital bed at home, which has helped. Unable to obtain weight today. Review of Systems   Constitutional:  Positive for activity change, appetite change and fatigue. Negative for chills, diaphoresis and fever. HENT:  Positive for trouble swallowing and voice change. Negative for congestion, ear discharge, ear pain and facial swelling. Respiratory:  Positive for cough and shortness of breath. Cardiovascular:  Positive for leg swelling. Negative for chest pain and palpitations. Gastrointestinal:  Negative for abdominal pain, blood in stool, constipation, diarrhea, nausea and vomiting. Genitourinary:  Negative for dysuria, flank pain, frequency, hematuria and pelvic pain. Skin:         Healing trach site, gtube site   Neurological:  Positive for weakness. Allergies, past medical history, family history, and social history reviewed and unchanged from previous encounter.      Current Outpatient Medications   Medication Sig Dispense Refill    acetaminophen (TYLENOL) 325 MG tablet Take 650 mg by mouth every 6 hours as needed for Pain      amLODIPine (NORVASC) 5 MG tablet Take 5 mg by mouth daily      budesonide (PULMICORT) 0.25 MG/2ML nebulizer suspension Take 1 ampule by nebulization 2 times daily      carvedilol (COREG) 12.5 MG tablet Take 12.5 mg by mouth 2 times daily (with meals)      cloNIDine (CATAPRES) 0.1 MG tablet Take 0.1 mg by mouth 2 times daily      formoterol (PERFOROMIST) 20 MCG/2ML nebulizer solution Take 20 mcg by nebulization every 12 hours      guaiFENesin (ROBITUSSIN) 100 MG/5ML SOLN oral solution Take 200 mg by mouth every 6 hours as needed for Cough      ferrous sulfate (IRON 325) 325 (65 Fe) MG tablet Take 325 mg by mouth daily (with breakfast)      escitalopram (LEXAPRO) 10 MG tablet Take 10 mg by mouth daily      scopolamine (TRANSDERM-SCOP) transdermal patch Place 1 patch onto the skin every 72 hours      senna-docusate (PERICOLACE) 8.6-50 MG per tablet Take 1 tablet by mouth in the morning and at bedtime      simethicone (MYLICON) 40 CI/4.4QZ LIQD drops Take 40 mg by mouth in the morning, at noon, and at bedtime      Lift Chair MISC by Does not apply route 1 each 0    furosemide (LASIX) 40 MG tablet Take 1 tablet by mouth in the morning. 45 tablet 1    acetylcysteine (MUCOMYST) 20 % nebulizer solution Inhale 3 mLs into the lungs 2 times daily 10 mL 0    montelukast (SINGULAIR) 10 MG tablet TAKE 1 TABLET EVERY DAY 90 tablet 1    apixaban (ELIQUIS) 2.5 MG TABS tablet Take 1 tablet by mouth 2 times daily 180 tablet 3    atorvastatin (LIPITOR) 40 MG tablet TAKE 1 AND 1/2 TABLETS EVERY  tablet 3    propafenone (RYTHMOL) 150 MG tablet TAKE 1 TABLET BY MOUTH EVERY 8 HOURS 270 tablet 3    carbidopa-levodopa (SINEMET)  MG per tablet Take 1 tablet by mouth 4 times daily      lamoTRIgine (LAMICTAL) 25 MG tablet Take 50 mg by mouth 2 times daily       Handicap Placard MISC by Does not apply route DX: G20  parkinson's disease  Exp: 9/1/2024 1 each 0    Multiple Vitamins-Minerals (THERAPEUTIC MULTIVITAMIN-MINERALS) tablet Take 1 tablet by mouth daily       No current facility-administered medications for this visit.        Vitals:    12/02/22 1411 BP: 138/80   Site: Right Lower Arm   Position: Sitting   Cuff Size: Large Adult   Pulse: 61   SpO2: 94%     Estimated body mass index is 38.74 kg/m² as calculated from the following:    Height as of 11/30/22: 5' 6\" (1.676 m). Weight as of 11/30/22: 240 lb (108.9 kg). Physical Exam  Vitals reviewed. Constitutional:       General: She is not in acute distress. Appearance: She is obese. She is ill-appearing. She is not toxic-appearing or diaphoretic. HENT:      Head: Normocephalic and atraumatic. Nose: Nose normal.      Mouth/Throat:      Mouth: Mucous membranes are moist.      Pharynx: Oropharynx is clear. No oropharyngeal exudate or posterior oropharyngeal erythema. Cardiovascular:      Rate and Rhythm: Normal rate and regular rhythm. Pulses: Normal pulses. Heart sounds: Normal heart sounds. Pulmonary:      Breath sounds: No stridor. Wheezing and rhonchi present. Abdominal:      General: Bowel sounds are normal. There is no distension. Palpations: Abdomen is soft. Tenderness: There is no abdominal tenderness. There is no guarding. Musculoskeletal:      Cervical back: Normal range of motion and neck supple. No rigidity or tenderness. Right lower leg: Edema present. Left lower leg: Edema present. Comments: Confined to wheelchair during appointment   Lymphadenopathy:      Cervical: No cervical adenopathy. Skin:     General: Skin is warm and dry. Capillary Refill: Capillary refill takes less than 2 seconds. Findings: Erythema present. Comments: Healing trach site in neck fold, clear drainage, no redness/swelling. Redness surrounding g-tube, no drainage/purulent drainage. Improving per . Neurological:      General: No focal deficit present. Mental Status: She is alert and oriented to person, place, and time. Mental status is at baseline. Motor: Weakness present.       Gait: Gait abnormal.   Psychiatric:         Mood and Affect: Mood normal.         Behavior: Behavior normal.         Thought Content:  Thought content normal.         Judgment: Judgment normal.

## 2022-12-03 ENCOUNTER — TELEPHONE (OUTPATIENT)
Dept: FAMILY MEDICINE CLINIC | Age: 82
End: 2022-12-03

## 2022-12-03 ENCOUNTER — TELEPHONE (OUTPATIENT)
Dept: OTHER | Facility: CLINIC | Age: 82
End: 2022-12-03

## 2022-12-03 ENCOUNTER — HOSPITAL ENCOUNTER (INPATIENT)
Age: 82
LOS: 4 days | Discharge: HOME | DRG: 682 | End: 2022-12-07
Attending: EMERGENCY MEDICINE | Admitting: INTERNAL MEDICINE
Payer: MEDICARE

## 2022-12-03 ENCOUNTER — APPOINTMENT (OUTPATIENT)
Dept: GENERAL RADIOLOGY | Age: 82
DRG: 682 | End: 2022-12-03
Payer: MEDICARE

## 2022-12-03 DIAGNOSIS — J40 BRONCHITIS: ICD-10-CM

## 2022-12-03 DIAGNOSIS — J96.01 ACUTE RESPIRATORY FAILURE WITH HYPOXIA (HCC): ICD-10-CM

## 2022-12-03 DIAGNOSIS — N17.9 AKI (ACUTE KIDNEY INJURY) (HCC): Primary | ICD-10-CM

## 2022-12-03 DIAGNOSIS — E86.0 DEHYDRATION: ICD-10-CM

## 2022-12-03 LAB
A/G RATIO: 0.7 (ref 1.1–2.2)
ALBUMIN SERPL-MCNC: 2.9 G/DL (ref 3.4–5)
ALP BLD-CCNC: 106 U/L (ref 40–129)
ALT SERPL-CCNC: <5 U/L (ref 10–40)
ANION GAP SERPL CALCULATED.3IONS-SCNC: 7 MMOL/L (ref 3–16)
AST SERPL-CCNC: 9 U/L (ref 15–37)
BASOPHILS ABSOLUTE: 0.1 K/UL (ref 0–0.2)
BASOPHILS RELATIVE PERCENT: 0.6 %
BILIRUB SERPL-MCNC: 0.3 MG/DL (ref 0–1)
BUN BLDV-MCNC: 70 MG/DL (ref 7–20)
CALCIUM SERPL-MCNC: 8.3 MG/DL (ref 8.3–10.6)
CHLORIDE BLD-SCNC: 98 MMOL/L (ref 99–110)
CO2: 25 MMOL/L (ref 21–32)
CREAT SERPL-MCNC: 2.1 MG/DL (ref 0.6–1.2)
EKG ATRIAL RATE: 71 BPM
EKG DIAGNOSIS: NORMAL
EKG P AXIS: 51 DEGREES
EKG P-R INTERVAL: 184 MS
EKG Q-T INTERVAL: 398 MS
EKG QRS DURATION: 120 MS
EKG QTC CALCULATION (BAZETT): 432 MS
EKG R AXIS: 63 DEGREES
EKG T AXIS: 55 DEGREES
EKG VENTRICULAR RATE: 71 BPM
EOSINOPHILS ABSOLUTE: 0.2 K/UL (ref 0–0.6)
EOSINOPHILS RELATIVE PERCENT: 1.1 %
GFR SERPL CREATININE-BSD FRML MDRD: 23 ML/MIN/{1.73_M2}
GLUCOSE BLD-MCNC: 103 MG/DL (ref 70–99)
GLUCOSE BLD-MCNC: 106 MG/DL (ref 70–99)
GLUCOSE BLD-MCNC: 118 MG/DL (ref 70–99)
HCT VFR BLD CALC: 24.1 % (ref 36–48)
HEMOGLOBIN: 7.8 G/DL (ref 12–16)
INFLUENZA A: NOT DETECTED
INFLUENZA B: NOT DETECTED
LYMPHOCYTES ABSOLUTE: 0.8 K/UL (ref 1–5.1)
LYMPHOCYTES RELATIVE PERCENT: 5.8 %
MAGNESIUM: 1.7 MG/DL (ref 1.8–2.4)
MCH RBC QN AUTO: 28.2 PG (ref 26–34)
MCHC RBC AUTO-ENTMCNC: 32.4 G/DL (ref 31–36)
MCV RBC AUTO: 86.9 FL (ref 80–100)
MONOCYTES ABSOLUTE: 1 K/UL (ref 0–1.3)
MONOCYTES RELATIVE PERCENT: 7.2 %
NEUTROPHILS ABSOLUTE: 12.4 K/UL (ref 1.7–7.7)
NEUTROPHILS RELATIVE PERCENT: 85.3 %
PDW BLD-RTO: 13.2 % (ref 12.4–15.4)
PERFORMED ON: ABNORMAL
PERFORMED ON: ABNORMAL
PLATELET # BLD: 241 K/UL (ref 135–450)
PMV BLD AUTO: 10.3 FL (ref 5–10.5)
POTASSIUM REFLEX MAGNESIUM: 3.5 MMOL/L (ref 3.5–5.1)
PRO-BNP: 3370 PG/ML (ref 0–449)
RBC # BLD: 2.78 M/UL (ref 4–5.2)
SARS-COV-2 RNA, RT PCR: NOT DETECTED
SODIUM BLD-SCNC: 130 MMOL/L (ref 136–145)
TOTAL PROTEIN: 6.9 G/DL (ref 6.4–8.2)
WBC # BLD: 14.5 K/UL (ref 4–11)

## 2022-12-03 PROCEDURE — 93005 ELECTROCARDIOGRAM TRACING: CPT | Performed by: EMERGENCY MEDICINE

## 2022-12-03 PROCEDURE — 2580000003 HC RX 258: Performed by: EMERGENCY MEDICINE

## 2022-12-03 PROCEDURE — 94660 CPAP INITIATION&MGMT: CPT

## 2022-12-03 PROCEDURE — 0202U NFCT DS 22 TRGT SARS-COV-2: CPT

## 2022-12-03 PROCEDURE — 84450 TRANSFERASE (AST) (SGOT): CPT

## 2022-12-03 PROCEDURE — 36415 COLL VENOUS BLD VENIPUNCTURE: CPT

## 2022-12-03 PROCEDURE — 94640 AIRWAY INHALATION TREATMENT: CPT

## 2022-12-03 PROCEDURE — 1200000000 HC SEMI PRIVATE

## 2022-12-03 PROCEDURE — 6360000002 HC RX W HCPCS: Performed by: HOSPITALIST

## 2022-12-03 PROCEDURE — 83735 ASSAY OF MAGNESIUM: CPT

## 2022-12-03 PROCEDURE — 2700000000 HC OXYGEN THERAPY PER DAY

## 2022-12-03 PROCEDURE — 94761 N-INVAS EAR/PLS OXIMETRY MLT: CPT

## 2022-12-03 PROCEDURE — 87636 SARSCOV2 & INF A&B AMP PRB: CPT

## 2022-12-03 PROCEDURE — 84132 ASSAY OF SERUM POTASSIUM: CPT

## 2022-12-03 PROCEDURE — 83880 ASSAY OF NATRIURETIC PEPTIDE: CPT

## 2022-12-03 PROCEDURE — 93010 ELECTROCARDIOGRAM REPORT: CPT | Performed by: INTERNAL MEDICINE

## 2022-12-03 PROCEDURE — 2580000003 HC RX 258

## 2022-12-03 PROCEDURE — 71045 X-RAY EXAM CHEST 1 VIEW: CPT

## 2022-12-03 PROCEDURE — 6370000000 HC RX 637 (ALT 250 FOR IP): Performed by: EMERGENCY MEDICINE

## 2022-12-03 PROCEDURE — 84460 ALANINE AMINO (ALT) (SGPT): CPT

## 2022-12-03 PROCEDURE — 85025 COMPLETE CBC W/AUTO DIFF WBC: CPT

## 2022-12-03 PROCEDURE — 6360000002 HC RX W HCPCS

## 2022-12-03 PROCEDURE — 99285 EMERGENCY DEPT VISIT HI MDM: CPT

## 2022-12-03 PROCEDURE — 2500000003 HC RX 250 WO HCPCS

## 2022-12-03 PROCEDURE — 6370000000 HC RX 637 (ALT 250 FOR IP)

## 2022-12-03 PROCEDURE — 80053 COMPREHEN METABOLIC PANEL: CPT

## 2022-12-03 RX ORDER — ONDANSETRON 4 MG/1
4 TABLET, ORALLY DISINTEGRATING ORAL EVERY 8 HOURS PRN
Status: DISCONTINUED | OUTPATIENT
Start: 2022-12-03 | End: 2022-12-07 | Stop reason: HOSPADM

## 2022-12-03 RX ORDER — ACETAMINOPHEN 325 MG/1
650 TABLET ORAL EVERY 6 HOURS PRN
Status: DISCONTINUED | OUTPATIENT
Start: 2022-12-03 | End: 2022-12-07 | Stop reason: HOSPADM

## 2022-12-03 RX ORDER — ACETAMINOPHEN 650 MG/1
650 SUPPOSITORY RECTAL EVERY 6 HOURS PRN
Status: DISCONTINUED | OUTPATIENT
Start: 2022-12-03 | End: 2022-12-07 | Stop reason: HOSPADM

## 2022-12-03 RX ORDER — IPRATROPIUM BROMIDE AND ALBUTEROL SULFATE 2.5; .5 MG/3ML; MG/3ML
1 SOLUTION RESPIRATORY (INHALATION) ONCE
Status: COMPLETED | OUTPATIENT
Start: 2022-12-03 | End: 2022-12-03

## 2022-12-03 RX ORDER — 0.9 % SODIUM CHLORIDE 0.9 %
1000 INTRAVENOUS SOLUTION INTRAVENOUS ONCE
Status: COMPLETED | OUTPATIENT
Start: 2022-12-03 | End: 2022-12-03

## 2022-12-03 RX ORDER — POLYETHYLENE GLYCOL 3350 17 G/17G
17 POWDER, FOR SOLUTION ORAL DAILY PRN
Status: DISCONTINUED | OUTPATIENT
Start: 2022-12-03 | End: 2022-12-07 | Stop reason: HOSPADM

## 2022-12-03 RX ORDER — SODIUM CHLORIDE 9 MG/ML
INJECTION, SOLUTION INTRAVENOUS PRN
Status: DISCONTINUED | OUTPATIENT
Start: 2022-12-03 | End: 2022-12-07 | Stop reason: HOSPADM

## 2022-12-03 RX ORDER — AMLODIPINE BESYLATE 5 MG/1
5 TABLET ORAL DAILY
Status: DISCONTINUED | OUTPATIENT
Start: 2022-12-03 | End: 2022-12-07 | Stop reason: HOSPADM

## 2022-12-03 RX ORDER — ESCITALOPRAM OXALATE 10 MG/1
10 TABLET ORAL DAILY
Status: DISCONTINUED | OUTPATIENT
Start: 2022-12-03 | End: 2022-12-07 | Stop reason: HOSPADM

## 2022-12-03 RX ORDER — LAMOTRIGINE 25 MG/1
50 TABLET ORAL 2 TIMES DAILY
Status: DISCONTINUED | OUTPATIENT
Start: 2022-12-03 | End: 2022-12-07 | Stop reason: HOSPADM

## 2022-12-03 RX ORDER — FERROUS SULFATE 325(65) MG
325 TABLET ORAL
Status: DISCONTINUED | OUTPATIENT
Start: 2022-12-04 | End: 2022-12-07 | Stop reason: HOSPADM

## 2022-12-03 RX ORDER — BUDESONIDE 0.5 MG/2ML
250 INHALANT ORAL 2 TIMES DAILY
Status: DISCONTINUED | OUTPATIENT
Start: 2022-12-03 | End: 2022-12-07 | Stop reason: HOSPADM

## 2022-12-03 RX ORDER — ONDANSETRON 2 MG/ML
4 INJECTION INTRAMUSCULAR; INTRAVENOUS EVERY 6 HOURS PRN
Status: DISCONTINUED | OUTPATIENT
Start: 2022-12-03 | End: 2022-12-07 | Stop reason: HOSPADM

## 2022-12-03 RX ORDER — ALBUTEROL SULFATE 2.5 MG/3ML
2.5 SOLUTION RESPIRATORY (INHALATION) EVERY 4 HOURS PRN
Status: DISCONTINUED | OUTPATIENT
Start: 2022-12-03 | End: 2022-12-04

## 2022-12-03 RX ORDER — PROPAFENONE HYDROCHLORIDE 150 MG/1
150 TABLET, FILM COATED ORAL EVERY 8 HOURS SCHEDULED
Status: DISCONTINUED | OUTPATIENT
Start: 2022-12-03 | End: 2022-12-07 | Stop reason: HOSPADM

## 2022-12-03 RX ORDER — SODIUM CHLORIDE 9 MG/ML
1000 INJECTION, SOLUTION INTRAVENOUS CONTINUOUS
Status: ACTIVE | OUTPATIENT
Start: 2022-12-03 | End: 2022-12-03

## 2022-12-03 RX ORDER — CARVEDILOL 6.25 MG/1
12.5 TABLET ORAL 2 TIMES DAILY WITH MEALS
Status: DISCONTINUED | OUTPATIENT
Start: 2022-12-03 | End: 2022-12-07 | Stop reason: HOSPADM

## 2022-12-03 RX ORDER — MONTELUKAST SODIUM 10 MG/1
10 TABLET ORAL DAILY
Status: DISCONTINUED | OUTPATIENT
Start: 2022-12-03 | End: 2022-12-07 | Stop reason: HOSPADM

## 2022-12-03 RX ORDER — METHYLPREDNISOLONE SODIUM SUCCINATE 40 MG/ML
40 INJECTION, POWDER, LYOPHILIZED, FOR SOLUTION INTRAMUSCULAR; INTRAVENOUS ONCE
Status: DISCONTINUED | OUTPATIENT
Start: 2022-12-03 | End: 2022-12-03

## 2022-12-03 RX ORDER — SODIUM CHLORIDE 0.9 % (FLUSH) 0.9 %
5-40 SYRINGE (ML) INJECTION EVERY 12 HOURS SCHEDULED
Status: DISCONTINUED | OUTPATIENT
Start: 2022-12-03 | End: 2022-12-07 | Stop reason: HOSPADM

## 2022-12-03 RX ORDER — ATORVASTATIN CALCIUM 40 MG/1
40 TABLET, FILM COATED ORAL DAILY
Status: DISCONTINUED | OUTPATIENT
Start: 2022-12-03 | End: 2022-12-07 | Stop reason: HOSPADM

## 2022-12-03 RX ORDER — CETIRIZINE HYDROCHLORIDE 10 MG/1
10 TABLET ORAL DAILY
Status: DISCONTINUED | OUTPATIENT
Start: 2022-12-03 | End: 2022-12-07 | Stop reason: HOSPADM

## 2022-12-03 RX ORDER — METHYLPREDNISOLONE SODIUM SUCCINATE 40 MG/ML
40 INJECTION, POWDER, LYOPHILIZED, FOR SOLUTION INTRAMUSCULAR; INTRAVENOUS DAILY
Status: DISCONTINUED | OUTPATIENT
Start: 2022-12-04 | End: 2022-12-07

## 2022-12-03 RX ORDER — SODIUM CHLORIDE 0.9 % (FLUSH) 0.9 %
10 SYRINGE (ML) INJECTION PRN
Status: DISCONTINUED | OUTPATIENT
Start: 2022-12-03 | End: 2022-12-07 | Stop reason: HOSPADM

## 2022-12-03 RX ORDER — CLONIDINE HYDROCHLORIDE 0.1 MG/1
0.1 TABLET ORAL 2 TIMES DAILY
Status: DISCONTINUED | OUTPATIENT
Start: 2022-12-03 | End: 2022-12-07 | Stop reason: HOSPADM

## 2022-12-03 RX ORDER — ACETYLCYSTEINE 200 MG/ML
600 SOLUTION ORAL; RESPIRATORY (INHALATION) 2 TIMES DAILY
Status: DISCONTINUED | OUTPATIENT
Start: 2022-12-03 | End: 2022-12-07 | Stop reason: HOSPADM

## 2022-12-03 RX ADMIN — LAMOTRIGINE 50 MG: 25 TABLET ORAL at 21:51

## 2022-12-03 RX ADMIN — SODIUM CHLORIDE 1000 ML: 9 INJECTION, SOLUTION INTRAVENOUS at 14:01

## 2022-12-03 RX ADMIN — AMLODIPINE BESYLATE 5 MG: 5 TABLET ORAL at 18:36

## 2022-12-03 RX ADMIN — ATORVASTATIN CALCIUM 40 MG: 40 TABLET, FILM COATED ORAL at 18:36

## 2022-12-03 RX ADMIN — Medication 1 TABLET: at 21:50

## 2022-12-03 RX ADMIN — CARVEDILOL 12.5 MG: 6.25 TABLET, FILM COATED ORAL at 18:36

## 2022-12-03 RX ADMIN — ESCITALOPRAM OXALATE 10 MG: 10 TABLET ORAL at 18:36

## 2022-12-03 RX ADMIN — DOXYCYCLINE 100 MG: 100 INJECTION, POWDER, LYOPHILIZED, FOR SOLUTION INTRAVENOUS at 15:06

## 2022-12-03 RX ADMIN — MONTELUKAST SODIUM 10 MG: 10 TABLET, COATED ORAL at 21:52

## 2022-12-03 RX ADMIN — CETIRIZINE HYDROCHLORIDE 10 MG: 10 TABLET ORAL at 18:36

## 2022-12-03 RX ADMIN — ALBUTEROL SULFATE 2.5 MG: 2.5 SOLUTION RESPIRATORY (INHALATION) at 23:21

## 2022-12-03 RX ADMIN — APIXABAN 2.5 MG: 5 TABLET, FILM COATED ORAL at 21:50

## 2022-12-03 RX ADMIN — IPRATROPIUM BROMIDE AND ALBUTEROL SULFATE 1 AMPULE: 2.5; .5 SOLUTION RESPIRATORY (INHALATION) at 10:07

## 2022-12-03 RX ADMIN — SODIUM CHLORIDE 1000 ML: 9 INJECTION, SOLUTION INTRAVENOUS at 14:00

## 2022-12-03 RX ADMIN — ACETYLCYSTEINE 600 MG: 200 INHALANT RESPIRATORY (INHALATION) at 23:21

## 2022-12-03 RX ADMIN — CLONIDINE HYDROCHLORIDE 0.1 MG: 0.1 TABLET ORAL at 21:51

## 2022-12-03 RX ADMIN — BUDESONIDE 250 MCG: 0.5 SUSPENSION RESPIRATORY (INHALATION) at 23:21

## 2022-12-03 RX ADMIN — PROPAFENONE HYDROCHLORIDE 150 MG: 150 TABLET, FILM COATED ORAL at 21:52

## 2022-12-03 ASSESSMENT — ENCOUNTER SYMPTOMS
COUGH: 1
VOMITING: 0
FACIAL SWELLING: 0
SHORTNESS OF BREATH: 1
BLOOD IN STOOL: 0
ABDOMINAL PAIN: 0
NAUSEA: 0
CONSTIPATION: 0
DIARRHEA: 0
VOICE CHANGE: 1
TROUBLE SWALLOWING: 1

## 2022-12-03 ASSESSMENT — LIFESTYLE VARIABLES
HOW OFTEN DO YOU HAVE A DRINK CONTAINING ALCOHOL: NEVER
HOW MANY STANDARD DRINKS CONTAINING ALCOHOL DO YOU HAVE ON A TYPICAL DAY: PATIENT DOES NOT DRINK

## 2022-12-03 ASSESSMENT — PAIN - FUNCTIONAL ASSESSMENT: PAIN_FUNCTIONAL_ASSESSMENT: NONE - DENIES PAIN

## 2022-12-03 NOTE — PROGRESS NOTES
Per the patient's ,      6pm-9am continous tube feed. States it is 5 bottles, plus water.      Bottles of feed are 250mL      States she gets 100ml every 2 hour for water flush   Feed 67ml/hr

## 2022-12-03 NOTE — ED NOTES
Patient O2 dropped to 87% off of oxygen. Informed Dr Tommy Padilla.  Placed back on 2 liters NC brought up 97%     Rylan Au RN  12/03/22 3835

## 2022-12-03 NOTE — ED NOTES
[address] [facility] [address] came and saw patient     Giovanni Vail  12/03/22 1402

## 2022-12-03 NOTE — ED PROVIDER NOTES
[address] ED      CHIEF COMPLAINT  Shortness of Breath (Pt brought in by EMS for SOB, EMS reported that pt was seen by PCP yesterday, blood work showed high potassium. Pt wears bipap machine at night. Pt reports feeling SOB after taking Bipap off this morning. EMS gave breathing treatment enroute ) and Abnormal Lab       HISTORY OF PRESENT ILLNESS  Govind Sheikh is a [de-identified] y.o. female  who presents to the ED complaining of concern for dyspnea. Started this morning when she went to take the BiPAP off and felt like she could not catch her breath. She uses BiPAP at night at baseline. EMS administered a breathing treatment in route which did improve her symptoms.  states that actually they were going to be coming here anyway as they had received a call from their PCP that on her blood work yesterday her potassium was 7 and they advised her to come immediately for further evaluation in the ER. He has been bring her in but when she became so dyspneic after removing the BiPAP he called EMS who brought her here for further evaluation. She denies any associated chest pain. She had been feeling her usual state health yesterday. She has a significant history of A. fib and is anticoagulated. Patient with multiple additional chronic medical comorbidities and history. No other complaints, modifying factors or associated symptoms. I have reviewed the following from the nursing documentation.     Past Medical History:   Diagnosis Date    Anemia, unspecified 2010    neg bone marrow    Asthma     Atrial fibrillation ([address]     Baker's cyst     Basal cell carcinoma of left cheek 3/12/2019    Blind left eye     Carotid artery stenoses     Carotid stenosis 2002    left    Cervical spinal cord compression ([address] 11/2010    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-sided sciatica 8/9/2016    CKD (chronic kidney disease) stage 3, GFR 30-59 ml/min ([address]     Community acquired pneumonia of left lower lobe of lung 9/8/2018    CRI     Detached retina, left     Diverticulosis     DJD (degenerative joint disease)     Edema     chronic    Fatty liver     GERD (gastroesophageal reflux disease)     Hearing loss     Herniated lumbar intervertebral disc     Hx stage 2 sacral decubitus ulcer 4/13/2018    Hyperlipidemia     Hypertension     Hypothyroid 1/27/2015    Morbid obesity with BMI of 40.0-44.9, adult ([facility] 2/10/2017    BETSY treated with BiPAP     Parkinson's disease ([facility]     Partial symptomatic epilepsy with complex partial seizures, not intractable, without status epilepticus ([address] 1/24/2020    Restless leg syndrome     Restless legs syndrome     Rheumatic fever 1950    2x IN [facility] [place]    Sleep apnea     Tremor, essential 8/16/2010    Type 2 diabetes mellitus without complication ([facility]     Type II or unspecified type diabetes mellitus without mention of complication, not stated as uncontrolled      Past Surgical History:   Procedure Laterality Date    APPENDECTOMY      BRONCHOSCOPY N/A 7/7/2022    BRONCHOSCOPY ALVEOLAR LAVAGE performed by Usama Harry MD at [facility]  7/7/2022    BRONCHOSCOPY [facility] performed by Usama Harry MD at [address]      left    CATARACT REMOVAL      CERVICAL FUSION  11/2010    CHOLECYSTECTOMY      COLONOSCOPY N/A 3/8/2020    COLONOSCOPY FLEXIBLE W/ DECOMPRESSION performed by Angel Ardon MD at [address] [facility] [address] [place]  11/8/2022    GASTROSTOMY/PEG TUBE CHANGE performed by Juana Godfrey DO at [address] (CERVIX STATUS UNKNOWN)      IR MIDLINE CATH  10/8/2021    IR MIDLINE CATH 10/8/2021 [facility] SPECIAL PROCEDURES    JOINT REPLACEMENT Bilateral 2003    KNEE SURGERY      replacement x 2    OVARY REMOVAL      SHOULDER SURGERY      TONSILLECTOMY      TRACHEOSTOMY N/A 7/15/2022    TRACHEOTOMY WITH PERCUTANEOUS ENDOSCOPIC GASTROSTOMY TUBE PLACEMENT performed by Pinky Kim MD at [facility] ENDOSCOPY  03/17/2020    Esophagitis    UPPER GASTROINTESTINAL ENDOSCOPY N/A 3/17/2020    EGD performed by Bravo Ronquillo MD at [place] [address]  11/8/2022    EGD ESOPHAGOGASTRODUODENOSCOPY performed by Mauricio Infante DO at [address]       Family History   Problem Relation Age of Onset    Diabetes Mother     Heart Disease Mother     Diabetes Father     Heart Disease Father     Diabetes Sister      Social History     Socioeconomic History    Marital status:      Spouse name: Joel Oneal    Number of children: 2    Years of education: 12    Highest education level: Not on file   Occupational History    Not on file   Tobacco Use    Smoking status: Former    Smokeless tobacco: Never   Vaping Use    Vaping Use: Never used   Substance and Sexual Activity    Alcohol use: No    Drug use: No    Sexual activity: Not Currently   Other Topics Concern    Not on file   Social History Narrative    ** Merged History Encounter **          Social Determinants of Health     Financial Resource Strain: Not on file   Food Insecurity: Not on file   Transportation Needs: Not on file   Physical Activity: Not on file   Stress: Not on file   Social Connections: Not on file   Intimate Partner Violence: Not on file   Housing Stability: Not on file     Current Facility-Administered Medications   Medication Dose Route Frequency Provider Last Rate Last Admin    0.9 % sodium chloride bolus  1,000 mL IntraVENous Once Tonio Ramires .9 mL/hr at 12/03/22 1401 1,000 mL at 12/03/22 1401    0.9 % sodium chloride infusion  1,000 mL IntraVENous Continuous Tonio Ramires  mL/hr at 12/03/22 1400 1,000 mL at 12/03/22 1400    [START ON 12/4/2022] methylPREDNISolone sodium (SOLU-MEDROL) injection 40 mg  40 mg IntraVENous Daily Rudy Pabon        doxycycline (VIBRAMYCIN) 100 mg in dextrose 5 % 100 mL IVPB  100 mg IntraVENous Q12H BIJAN Pabon 100 mL/hr at 12/03/22 1506 100 mg at 12/03/22 1506     Current Outpatient Medications   Medication Sig Dispense Refill    scopolamine (TRANSDERM-SCOP) transdermal patch Place 1 patch onto the skin every 72 hours 10 patch 0    cetirizine (ZYRTEC) 10 MG tablet 1 tablet by Per G Tube route daily 90 tablet 2    simethicone (MYLICON) 40 XN/6.4HZ LIQD drops 0.6 mLs by Per G Tube route in the morning, at noon, and at bedtime (Patient not taking: Reported on 12/3/2022) 10 mL 1    propafenone (RYTHMOL) 150 MG tablet 1 tablet by Per G Tube route every 8 hours 270 tablet 3    montelukast (SINGULAIR) 10 MG tablet 1 tablet by Per G Tube route daily TAKE 1 TABLET EVERY DAY 90 tablet 1    lamoTRIgine (LAMICTAL) 25 MG tablet 2 tablets by Per G Tube route 2 times daily 30 tablet 2    furosemide (LASIX) 40 MG tablet 1 tablet by Per G Tube route daily 90 tablet 1    ferrous sulfate (IRON 325) 325 (65 Fe) MG tablet 1 tablet by Per G Tube route daily (with breakfast) 90 tablet 1    escitalopram (LEXAPRO) 10 MG tablet 1 tablet by Per G Tube route daily 90 tablet 2    cloNIDine (CATAPRES) 0.1 MG tablet 1 tablet by Per G Tube route 2 times daily 180 tablet 2    carvedilol (COREG) 12.5 MG tablet 1 tablet by Per G Tube route 2 times daily (with meals) 60 tablet 2    carbidopa-levodopa (SINEMET)  MG per tablet 1 tablet by Per G Tube route 4 times daily 90 tablet 1    atorvastatin (LIPITOR) 40 MG tablet 1.5 tablets by Per G Tube route daily TAKE 1 AND 1/2 TABLETS EVERY  tablet 3    apixaban (ELIQUIS) 2.5 MG TABS tablet 1 tablet by Per G Tube route 2 times daily 180 tablet 3    amLODIPine (NORVASC) 5 MG tablet 1 tablet by Per G Tube route daily 90 tablet 2    budesonide (PULMICORT) 0.25 MG/2ML nebulizer suspension Take 2 mLs by nebulization 2 times daily 60 each 2    formoterol (PERFOROMIST) 20 MCG/2ML nebulizer solution Take 2 mLs by nebulization every 12 hours Take 20 mcg by nebulization every 12 hours 120 mL 0    acetaminophen (TYLENOL) 325 MG tablet Take 650 mg by mouth every 6 hours as needed for Pain      guaiFENesin (ROBITUSSIN) 100 MG/5ML SOLN oral solution Take 200 mg by mouth every 6 hours as needed for Cough      senna-docusate (PERICOLACE) 8.6-50 MG per tablet Take 1 tablet by mouth in the morning and at bedtime (Patient not taking: Reported on 12/3/2022)      acetylcysteine (MUCOMYST) 20 % nebulizer solution Inhale 3 mLs into the lungs 2 times daily 10 mL 0    Multiple Vitamins-Minerals (THERAPEUTIC MULTIVITAMIN-MINERALS) tablet Take 1 tablet by mouth daily       Allergies   Allergen Reactions    Levaquin [Levofloxacin]      Pt was given in er and developed hives and redness    Metoclopramide     Phenazopyridine     Pyridium [Phenazopyridine Hcl]     Reglan [Metoclopramide Hcl]     Reglan [Metoclopramide Hcl]      tremors       REVIEW OF SYSTEMS  10 systems reviewed, pertinent positives per HPI otherwise noted to be negative. PHYSICAL EXAM  BP (!) 163/57   Pulse 77   Temp 99.1 °F (37.3 °C) (Oral)   Resp 22   Ht 5' 6\" (1.676 m)   Wt 240 lb (108.9 kg)   SpO2 97%   BMI 38.74 kg/m²    GENERAL APPEARANCE: Awake and alert. Cooperative. Chronically ill-appearing. Slight respiratory distress. HENT: Normocephalic. Atraumatic. Mucous membranes are moist.  No drooling or stridor. NECK: Supple. EYES: PERRL. EOM's grossly intact. HEART/CHEST: RRR. No murmurs. LUNGS: Respirations mildly labored. Expiratory wheezes throughout. .  Fair air exchange. Speaking in full sentences. ABDOMEN: No tenderness. G-tube in place in the left mid abdomen. Soft. Non-distended. No masses. No organomegaly. No guarding or rebound. MUSCULOSKELETAL: Mild bilateral lower extremity edema. Compartments soft. No deformity. No tenderness in the extremities.   All extremities neurovascularly intact. SKIN: Warm and dry. No acute rashes. NEUROLOGICAL: Alert and oriented. CN's 2-12 intact. No gross facial drooping. No gross focal deficits  PSYCHIATRIC: Normal mood and affect. LABS  I have reviewed all labs for this visit.    Results for orders placed or performed during the hospital encounter of 12/03/22   COVID-19 & Influenza Combo    Specimen: Nasopharyngeal Swab   Result Value Ref Range    SARS-CoV-2 RNA, RT PCR NOT DETECTED NOT DETECTED    INFLUENZA A NOT DETECTED NOT DETECTED    INFLUENZA B NOT DETECTED NOT DETECTED   CBC with Auto Differential   Result Value Ref Range    WBC 14.5 (H) 4.0 - 11.0 K/uL    RBC 2.78 (L) 4.00 - 5.20 M/uL    Hemoglobin 7.8 (L) 12.0 - 16.0 g/dL    Hematocrit 24.1 (L) 36.0 - 48.0 %    MCV 86.9 80.0 - 100.0 fL    MCH 28.2 26.0 - 34.0 pg    MCHC 32.4 31.0 - 36.0 g/dL    RDW 13.2 12.4 - 15.4 %    Platelets 863 376 - 949 K/uL    MPV 10.3 5.0 - 10.5 fL    Neutrophils % 85.3 %    Lymphocytes % 5.8 %    Monocytes % 7.2 %    Eosinophils % 1.1 %    Basophils % 0.6 %    Neutrophils Absolute 12.4 (H) 1.7 - 7.7 K/uL    Lymphocytes Absolute 0.8 (L) 1.0 - 5.1 K/uL    Monocytes Absolute 1.0 0.0 - 1.3 K/uL    Eosinophils Absolute 0.2 0.0 - 0.6 K/uL    Basophils Absolute 0.1 0.0 - 0.2 K/uL   CMP w/ Reflex to MG   Result Value Ref Range    Sodium 130 (L) 136 - 145 mmol/L    Chloride 98 (L) 99 - 110 mmol/L    CO2 25 21 - 32 mmol/L    Anion Gap 7 3 - 16    Glucose 118 (H) 70 - 99 mg/dL    BUN 70 (H) 7 - 20 mg/dL    Creatinine 2.1 (H) 0.6 - 1.2 mg/dL    Est, Glom Filt Rate 23 (A) >60    Calcium 8.3 8.3 - 10.6 mg/dL    Total Protein 6.9 6.4 - 8.2 g/dL    Albumin 2.9 (L) 3.4 - 5.0 g/dL    Albumin/Globulin Ratio 0.7 (L) 1.1 - 2.2    Total Bilirubin 0.3 0.0 - 1.0 mg/dL    Alkaline Phosphatase 106 40 - 129 U/L   Brain Natriuretic Peptide   Result Value Ref Range    Pro-BNP 3,370 (H) 0 - 449 pg/mL   Potassium w/ Reflex to Magnesium   Result Value Ref Range    Potassium reflex Magnesium 3.5 3.5 - 5.1 mmol/L   ALT   Result Value Ref Range    ALT <5 (L) 10 - 40 U/L   AST   Result Value Ref Range    AST 9 (L) 15 - 37 U/L   Magnesium   Result Value Ref Range    Magnesium 1.70 (L) 1.80 - 2.40 mg/dL   EKG 12 Lead   Result Value Ref Range    Ventricular Rate 71 BPM    Atrial Rate 71 BPM    P-R Interval 184 ms    QRS Duration 120 ms    Q-T Interval 398 ms    QTc Calculation (Bazett) 432 ms    P Axis 51 degrees    R Axis 63 degrees    T Axis 55 degrees    Diagnosis       Normal sinus rhythmNon-specific intra-ventricular conduction delayBorderline ECGWhen compared with ECG of 07-NOV-2022 14:53,QRS duration has increasedNonspecific T wave abnormality no longer evident in Lateral leads       ECG  The Ekg interpreted by me shows  normal sinus rhythm with a rate of 71  Axis is   Normal  QTc is  normal  Intervals and Durations are unremarkable. ST Segments: nonspecific changes  No significant change from prior EKG dated 11/7/22    RADIOLOGY  CT ABDOMEN PELVIS WO CONTRAST Additional Contrast? None    Result Date: 11/7/2022  EXAMINATION: CT OF THE ABDOMEN AND PELVIS WITHOUT CONTRAST 11/7/2022 12:40 pm TECHNIQUE: CT of the abdomen and pelvis was performed without the administration of intravenous contrast. Multiplanar reformatted images are provided for review. Automated exposure control, iterative reconstruction, and/or weight based adjustment of the mA/kV was utilized to reduce the radiation dose to as low as reasonably achievable. COMPARISON: CT of the abdomen and pelvis 07/02/2022 and chest CT 10/04/2022. HISTORY: ORDERING SYSTEM PROVIDED HISTORY: abd pain with tube feeds TECHNOLOGIST PROVIDED HISTORY: Reason for exam:->abd pain with tube feeds Additional Contrast?->None Reason for Exam: abd pain with tube feeds, gfr of 41, w/o IV contrast per PA [place] FINDINGS: Lower Chest: New dependent airspace consolidation involves the posterior basal segment of the right lower lobe. There are trace pleural effusions.  Organs: The liver, spleen, adrenal glands and pancreas are unremarkable. There has been a cholecystectomy. There is a small exophytic hyperdense cyst laterally at the left kidney. There is no ureteral stone or hydronephrosis. GI/Bowel: There is no bowel dilatation or bowel wall thickening. There are colonic diverticula. There is a percutaneous gastrostomy tube in the gastric antrum. The stomach is unremarkable. Pelvis: Bladder is unremarkable. Peritoneum/Retroperitoneum: The abdominal aorta is normal in caliber. No fat stranding, free fluid, free air or focal fluid collection is identified. Bones/Soft Tissues: No destructive bone lesion is identified. 1. No acute findings in the abdomen or pelvis. 2. Percutaneous gastrostomy tube with the balloon at the gastric antrum. 3. New airspace opacity at the medial aspect of the right lower lobe that could reflect atelectasis or pneumonia. XR CHEST (2 VW)    Result Date: 12/2/2022  EXAMINATION: TWO XRAY VIEWS OF THE CHEST 12/2/2022 12:53 pm COMPARISON: None. HISTORY: ORDERING SYSTEM PROVIDED HISTORY: SOB (shortness of breath) TECHNOLOGIST PROVIDED HISTORY: Reason for exam:->dysphagia, g-tube, increased SOB, fine crackles on exam. rule out aspiration pneumonia. Reason for Exam: dysphagia, increased SOB, fine crackles FINDINGS: The lungs and costophrenic angles are clear. There is borderline cardiomegaly. There is no vascular congestion or interstitial prominence. No confluent airspace consolidation. Borderline cardiomegaly. XR ABDOMEN (KUB) (SINGLE AP VIEW)    Result Date: 11/30/2022  EXAMINATION: ONE SUPINE XRAY VIEW(S) OF THE ABDOMEN 11/30/2022 9:28 am COMPARISON: 11/23/2022 HISTORY: ORDERING SYSTEM PROVIDED HISTORY: G tube study TECHNOLOGIST PROVIDED HISTORY: Reason for exam:->G tube study Reason for Exam: Feeding tube problem FINDINGS: Contrast has been instilled through the gastrostomy tube. The tube projects to the gastric body.   There is filling of the distal stomach with contrast, which progresses to the duodenal sweep. Bowel gas pattern is normal.  No free air. Normal, intraluminal position of the gastrostomy tube. XR ABDOMEN (KUB) (SINGLE AP VIEW)    Result Date: 11/23/2022  EXAMINATION: ONE SUPINE XRAY VIEW(S) OF THE ABDOMEN 11/23/2022 6:11 pm COMPARISON: CT abdomen/pelvis dated 7 November 2022 HISTORY: ORDERING SYSTEM PROVIDED HISTORY: g tube replaced TECHNOLOGIST PROVIDED HISTORY: Port peg please Reason for exam:->g tube replaced Reason for Exam: G tube replaced FINDINGS: A catheter projects over the left abdomen with the tip in either the distal stomach or proximal duodenum. Enteric contrast material is seen within multiple small bowel loops. No bowel obstruction. Catheter projecting over the left abdomen with the tip in the distal stomach or proximal duodenum. XR ABDOMEN (KUB) (SINGLE AP VIEW)    Result Date: 11/7/2022  EXAMINATION: ONE SUPINE XRAY VIEW(S) OF THE ABDOMEN 11/7/2022 1:39 pm COMPARISON: Radiograph 07/02/2022 and CT 07/02/2022. HISTORY: ORDERING SYSTEM PROVIDED HISTORY: confirmation of PEG Tube TECHNOLOGIST PROVIDED HISTORY: Reason for exam:->confirmation of PEG Tube Reason for Exam: confirmation of PEG Tube FINDINGS: There is no dilated bowel. There is a percutaneous Crestor ostomy tube in the stomach with contrast in the stomach. Linear opacities along the tract of the tube are consistent with chondral calcifications present previously. Normally located percutaneous gastrostomy tube. No evidence of bowel obstruction. XR CHEST PORTABLE    Result Date: 12/3/2022  EXAMINATION: ONE XRAY VIEW OF THE CHEST 12/3/2022 9:52 am COMPARISON: 12/02/2022 HISTORY: ORDERING SYSTEM PROVIDED HISTORY: dyspnea TECHNOLOGIST PROVIDED HISTORY: Reason for exam:->dyspnea Reason for Exam: dyspnea FINDINGS: Anterior fusion plate in the mid to lower cervical spine is noted.  Cardiac silhouette is normal.  There is thoracic aortic calcification. Bronchial wall thickening is noted, perhaps mildly increased. No consolidation or pleural effusion is identified. Bronchial wall thickening, perhaps mildly increased, bronchitis or reactive airway disease. No consolidation is identified. ED COURSE/MDM  Patient seen and evaluated. Old records reviewed. Labs and imaging reviewed and results discussed with patient. Patient presenting for evaluation due to concerns for hyperkalemia. She is also noted to have acute shortness of breath this morning which I feel is likely secondary to bronchospasm possible bronchitis. She is given an additional bronchodilator treatment while here and had received 1 in route and is feeling much improved. She has been placed on oxygen and actually tells me that she is only on 2 L of oxygen at nighttime and not typically on oxygen during the day. I attempted to wean her oxygen initially she maintain her saturations in the low 90s but then started having oxygen saturations in the 80s therefore we did place her back on oxygen and will administer IV steroids after discussion with the hospitalist team regarding her acute respiratory failure likely bronchitis. No confluent infiltrate or evidence of definite pneumonia. Her chest x-ray is more consistent with bronchitis/RAD. I will hold off on IV antibiotics as I do not feel that the is evidence of pneumonia at this time. She was initially sent in by her PCP due to concern for hyperkalemia but her potassium here today actually is 3.5. However, she does have evidence of FARHEEN with a creatinine that is nearly double what her most recent 1 was, currently 2.1. We will administer IV fluids as she has an increased BUN as well indicative of possible dehydration. At this time, I felt that inpatient admission to the hospitalist team is most appropriate patient will be admitted at this time. I, Dr. Caridad Burt MD, am the primary clinician of record.     Is this patient to be included in the SEP-1 Core Measure? No   Exclusion criteria - the patient is NOT to be included for SEP-1 Core Measure due to: Bacterial Infection is not suspected     During the patient's ED course, the patient was given:  Medications   0.9 % sodium chloride bolus (1,000 mLs IntraVENous New Bag 12/3/22 1401)   0.9 % sodium chloride infusion (1,000 mLs IntraVENous New Bag 12/3/22 1400)   methylPREDNISolone sodium (SOLU-MEDROL) injection 40 mg (has no administration in time range)   doxycycline (VIBRAMYCIN) 100 mg in dextrose 5 % 100 mL IVPB (100 mg IntraVENous New Bag 12/3/22 1506)   ipratropium-albuterol (DUONEB) nebulizer solution 1 ampule (1 ampule Inhalation Given 12/3/22 1007)        CLINICAL IMPRESSION  1. FARHEEN (acute kidney injury) ([facility]    2. Acute respiratory failure with hypoxia (HCC)    3. Bronchitis    4. Dehydration        Blood pressure (!) 163/57, pulse 77, temperature 99.1 °F (37.3 °C), temperature source Oral, resp. rate 22, height 5' 6\" (1.676 m), weight 240 lb (108.9 kg), SpO2 97 %, not currently breastfeeding. DISPOSITION  Alejandro Stern was admitted in stable condition. DISCLAIMER: This chart was created using Dragon dictation software. Efforts were made by me to ensure accuracy, however some errors may be present due to limitations of this technology and occasionally words are not transcribed correctly.        Cherry Wright MD  12/03/22 [id number]

## 2022-12-03 NOTE — PLAN OF CARE
MedSurg    FARHEEN. IVF. Presented for hyperK and hyperMg on labs from PCP which were normal on arrival to ED. Resume tube feeds. ? Acute Bronchitis. Bronchial wall thickening on CXR. Initially on 4L but weaned to room air after 1 duoneb. Continue home inhalers. Anemia. Chronic FÁTIMA. Continue iron supp. Monitor Hgb.      Fadumo Todd PA-C  12/3/2022 1:55 PM        ADDENDUM 2:33 PM    Received PS from Dr. Sylvia Copeland in ED and patient has become hypoxic 88% on room air at rest.     Acute respiratory failure with hypoxia  Asthma with acute exacerbation  -Will start solumedrol 40mg daily   -Check respiratory viral panel  -with leukocytosis will add doxy for now

## 2022-12-03 NOTE — ED NOTES
SHAHANA RN AT BEDSIDE TO TRANSPORT PATIENT TO [address] [place] [address] Seamus Gale RN  12/03/22 5546

## 2022-12-03 NOTE — TELEPHONE ENCOUNTER
Writer attempted to contact ED provider to inform of 30-day readmission risk via phone . Writer's attempt to contact ED provider was unsuccessful. Callback: If you need to callback to inform of disposition, please call [phone].

## 2022-12-03 NOTE — ED NOTES
Patients  Cooper Spear, was called per patients request. Updated spouse on plan of care.  [address] Maria Teresa Harper RN  12/03/22 7666

## 2022-12-03 NOTE — TELEPHONE ENCOUNTER
I rec'd an on call page for critical lab results this morning at 6:44 a.m. The  stated that the pt's potassium level was 7.1. I asked her if the specimen was hemolyzed and she said that it was not. It appears that the pt was seen by Sharonda Roy CNP yesterday for an ER f/u. In her notes she stated that she was ill appearing and had some rhonchi with wheezing. No acute findings on her chest xray. Other lab results rev'd:  Creatinine is 2.3, GFR is 21, BNP is 3,443, WBC is 14.3 and mag is 3.30. I called the pt at the number listed and spoke with her , Roberto Carlos Jacinto. He stated that the pt was still sleeping. I informed him of the critical lab results and told him that he needed to take her to the ER right away for further evaluation and treatment. He said that he would get her up and dressed and take her [facility] ER \"right now\". Will copy message to PCP and Sharonda Roy CNP as well to keep them UTD.

## 2022-12-04 LAB
ANION GAP SERPL CALCULATED.3IONS-SCNC: 8 MMOL/L (ref 3–16)
ANION GAP SERPL CALCULATED.3IONS-SCNC: 8 MMOL/L (ref 3–16)
BASOPHILS ABSOLUTE: 0 K/UL (ref 0–0.2)
BASOPHILS RELATIVE PERCENT: 0.1 %
BUN BLDV-MCNC: 64 MG/DL (ref 7–20)
BUN BLDV-MCNC: 65 MG/DL (ref 7–20)
CALCIUM SERPL-MCNC: 8.7 MG/DL (ref 8.3–10.6)
CALCIUM SERPL-MCNC: 9 MG/DL (ref 8.3–10.6)
CHLORIDE BLD-SCNC: 101 MMOL/L (ref 99–110)
CHLORIDE BLD-SCNC: 104 MMOL/L (ref 99–110)
CO2: 26 MMOL/L (ref 21–32)
CO2: 27 MMOL/L (ref 21–32)
CREAT SERPL-MCNC: 2 MG/DL (ref 0.6–1.2)
CREAT SERPL-MCNC: 2 MG/DL (ref 0.6–1.2)
EOSINOPHILS ABSOLUTE: 0.1 K/UL (ref 0–0.6)
EOSINOPHILS RELATIVE PERCENT: 0.9 %
GFR SERPL CREATININE-BSD FRML MDRD: 25 ML/MIN/{1.73_M2}
GFR SERPL CREATININE-BSD FRML MDRD: 25 ML/MIN/{1.73_M2}
GLUCOSE BLD-MCNC: 103 MG/DL (ref 70–99)
GLUCOSE BLD-MCNC: 110 MG/DL (ref 70–99)
GLUCOSE BLD-MCNC: 128 MG/DL (ref 70–99)
GLUCOSE BLD-MCNC: 138 MG/DL (ref 70–99)
GLUCOSE BLD-MCNC: 153 MG/DL (ref 70–99)
GLUCOSE BLD-MCNC: 177 MG/DL (ref 70–99)
GLUCOSE BLD-MCNC: 190 MG/DL (ref 70–99)
GLUCOSE BLD-MCNC: 230 MG/DL (ref 70–99)
HCT VFR BLD CALC: 23.1 % (ref 36–48)
HEMOGLOBIN: 7.3 G/DL (ref 12–16)
LYMPHOCYTES ABSOLUTE: 0.7 K/UL (ref 1–5.1)
LYMPHOCYTES RELATIVE PERCENT: 6.8 %
MAGNESIUM: 3.1 MG/DL (ref 1.8–2.4)
MCH RBC QN AUTO: 28.7 PG (ref 26–34)
MCHC RBC AUTO-ENTMCNC: 31.7 G/DL (ref 31–36)
MCV RBC AUTO: 90.5 FL (ref 80–100)
MONOCYTES ABSOLUTE: 0.9 K/UL (ref 0–1.3)
MONOCYTES RELATIVE PERCENT: 8.2 %
NEUTROPHILS ABSOLUTE: 8.8 K/UL (ref 1.7–7.7)
NEUTROPHILS RELATIVE PERCENT: 84 %
PDW BLD-RTO: 13.1 % (ref 12.4–15.4)
PERFORMED ON: ABNORMAL
PLATELET # BLD: 221 K/UL (ref 135–450)
PMV BLD AUTO: 10.1 FL (ref 5–10.5)
POTASSIUM REFLEX MAGNESIUM: 6.2 MMOL/L (ref 3.5–5.1)
POTASSIUM REFLEX MAGNESIUM: 6.3 MMOL/L (ref 3.5–5.1)
POTASSIUM SERPL-SCNC: 6.3 MMOL/L (ref 3.5–5.1)
RBC # BLD: 2.55 M/UL (ref 4–5.2)
REPORT: NORMAL
RESPIRATORY PANEL PCR: NORMAL
SODIUM BLD-SCNC: 135 MMOL/L (ref 136–145)
SODIUM BLD-SCNC: 139 MMOL/L (ref 136–145)
WBC # BLD: 10.4 K/UL (ref 4–11)

## 2022-12-04 PROCEDURE — 6370000000 HC RX 637 (ALT 250 FOR IP): Performed by: INTERNAL MEDICINE

## 2022-12-04 PROCEDURE — 84132 ASSAY OF SERUM POTASSIUM: CPT

## 2022-12-04 PROCEDURE — 6360000002 HC RX W HCPCS: Performed by: INTERNAL MEDICINE

## 2022-12-04 PROCEDURE — 2580000003 HC RX 258: Performed by: INTERNAL MEDICINE

## 2022-12-04 PROCEDURE — 2580000003 HC RX 258

## 2022-12-04 PROCEDURE — 1200000000 HC SEMI PRIVATE

## 2022-12-04 PROCEDURE — 85025 COMPLETE CBC W/AUTO DIFF WBC: CPT

## 2022-12-04 PROCEDURE — 2500000003 HC RX 250 WO HCPCS: Performed by: INTERNAL MEDICINE

## 2022-12-04 PROCEDURE — 83735 ASSAY OF MAGNESIUM: CPT

## 2022-12-04 PROCEDURE — 94761 N-INVAS EAR/PLS OXIMETRY MLT: CPT

## 2022-12-04 PROCEDURE — 99223 1ST HOSP IP/OBS HIGH 75: CPT | Performed by: INTERNAL MEDICINE

## 2022-12-04 PROCEDURE — 36415 COLL VENOUS BLD VENIPUNCTURE: CPT

## 2022-12-04 PROCEDURE — 6360000002 HC RX W HCPCS

## 2022-12-04 PROCEDURE — 6370000000 HC RX 637 (ALT 250 FOR IP)

## 2022-12-04 PROCEDURE — 2700000000 HC OXYGEN THERAPY PER DAY

## 2022-12-04 PROCEDURE — 51798 US URINE CAPACITY MEASURE: CPT

## 2022-12-04 PROCEDURE — 80048 BASIC METABOLIC PNL TOTAL CA: CPT

## 2022-12-04 PROCEDURE — 2500000003 HC RX 250 WO HCPCS

## 2022-12-04 PROCEDURE — 94640 AIRWAY INHALATION TREATMENT: CPT

## 2022-12-04 PROCEDURE — 94660 CPAP INITIATION&MGMT: CPT

## 2022-12-04 RX ORDER — INSULIN LISPRO 100 [IU]/ML
0-4 INJECTION, SOLUTION INTRAVENOUS; SUBCUTANEOUS EVERY 4 HOURS
Status: DISCONTINUED | OUTPATIENT
Start: 2022-12-04 | End: 2022-12-07 | Stop reason: HOSPADM

## 2022-12-04 RX ORDER — 0.9 % SODIUM CHLORIDE 0.9 %
1000 INTRAVENOUS SOLUTION INTRAVENOUS ONCE
Status: COMPLETED | OUTPATIENT
Start: 2022-12-04 | End: 2022-12-04

## 2022-12-04 RX ORDER — SODIUM CHLORIDE 9 MG/ML
INJECTION, SOLUTION INTRAVENOUS CONTINUOUS
Status: ACTIVE | OUTPATIENT
Start: 2022-12-04 | End: 2022-12-04

## 2022-12-04 RX ORDER — FLUDROCORTISONE ACETATE 0.1 MG/1
0.2 TABLET ORAL ONCE
Status: COMPLETED | OUTPATIENT
Start: 2022-12-04 | End: 2022-12-04

## 2022-12-04 RX ORDER — DEXTROSE MONOHYDRATE 100 MG/ML
INJECTION, SOLUTION INTRAVENOUS CONTINUOUS PRN
Status: DISCONTINUED | OUTPATIENT
Start: 2022-12-04 | End: 2022-12-07 | Stop reason: HOSPADM

## 2022-12-04 RX ORDER — SODIUM POLYSTYRENE SULFONATE 15 G/60ML
15 SUSPENSION ORAL; RECTAL ONCE
Status: COMPLETED | OUTPATIENT
Start: 2022-12-04 | End: 2022-12-04

## 2022-12-04 RX ORDER — FUROSEMIDE 10 MG/ML
40 INJECTION INTRAMUSCULAR; INTRAVENOUS ONCE
Status: COMPLETED | OUTPATIENT
Start: 2022-12-04 | End: 2022-12-04

## 2022-12-04 RX ORDER — ALBUTEROL SULFATE 2.5 MG/3ML
2.5 SOLUTION RESPIRATORY (INHALATION) 2 TIMES DAILY
Status: DISCONTINUED | OUTPATIENT
Start: 2022-12-04 | End: 2022-12-04

## 2022-12-04 RX ORDER — ALBUTEROL SULFATE 2.5 MG/3ML
2.5 SOLUTION RESPIRATORY (INHALATION) 3 TIMES DAILY
Status: DISCONTINUED | OUTPATIENT
Start: 2022-12-04 | End: 2022-12-07

## 2022-12-04 RX ADMIN — Medication 1 TABLET: at 18:46

## 2022-12-04 RX ADMIN — MONTELUKAST SODIUM 10 MG: 10 TABLET, COATED ORAL at 21:34

## 2022-12-04 RX ADMIN — ACETYLCYSTEINE 600 MG: 200 INHALANT RESPIRATORY (INHALATION) at 20:27

## 2022-12-04 RX ADMIN — SODIUM ZIRCONIUM CYCLOSILICATE 10 G: 10 POWDER, FOR SUSPENSION ORAL at 15:18

## 2022-12-04 RX ADMIN — INSULIN LISPRO 1 UNITS: 100 INJECTION, SOLUTION INTRAVENOUS; SUBCUTANEOUS at 16:53

## 2022-12-04 RX ADMIN — SODIUM CHLORIDE 1000 ML: 9 INJECTION, SOLUTION INTRAVENOUS at 13:04

## 2022-12-04 RX ADMIN — ACETYLCYSTEINE 600 MG: 200 INHALANT RESPIRATORY (INHALATION) at 09:01

## 2022-12-04 RX ADMIN — Medication 1 TABLET: at 21:38

## 2022-12-04 RX ADMIN — CETIRIZINE HYDROCHLORIDE 10 MG: 10 TABLET ORAL at 08:45

## 2022-12-04 RX ADMIN — PROPAFENONE HYDROCHLORIDE 150 MG: 150 TABLET, FILM COATED ORAL at 15:18

## 2022-12-04 RX ADMIN — METHYLPREDNISOLONE SODIUM SUCCINATE 40 MG: 40 INJECTION, POWDER, FOR SOLUTION INTRAMUSCULAR; INTRAVENOUS at 08:44

## 2022-12-04 RX ADMIN — APIXABAN 2.5 MG: 5 TABLET, FILM COATED ORAL at 08:45

## 2022-12-04 RX ADMIN — OLODATEROL RESPIMAT INHALATION SPRAY 2 PUFF: 2.5 SPRAY, METERED RESPIRATORY (INHALATION) at 09:01

## 2022-12-04 RX ADMIN — CLONIDINE HYDROCHLORIDE 0.1 MG: 0.1 TABLET ORAL at 21:40

## 2022-12-04 RX ADMIN — ALBUTEROL SULFATE 2.5 MG: 2.5 SOLUTION RESPIRATORY (INHALATION) at 20:27

## 2022-12-04 RX ADMIN — DEXTROSE MONOHYDRATE 250 ML: 10 INJECTION, SOLUTION INTRAVENOUS at 08:42

## 2022-12-04 RX ADMIN — BUDESONIDE 250 MCG: 0.5 SUSPENSION RESPIRATORY (INHALATION) at 09:01

## 2022-12-04 RX ADMIN — BUDESONIDE 250 MCG: 0.5 SUSPENSION RESPIRATORY (INHALATION) at 20:27

## 2022-12-04 RX ADMIN — INSULIN HUMAN 10 UNITS: 100 INJECTION, SOLUTION PARENTERAL at 08:38

## 2022-12-04 RX ADMIN — SODIUM CHLORIDE, PRESERVATIVE FREE 10 ML: 5 INJECTION INTRAVENOUS at 21:38

## 2022-12-04 RX ADMIN — APIXABAN 2.5 MG: 5 TABLET, FILM COATED ORAL at 21:34

## 2022-12-04 RX ADMIN — LAMOTRIGINE 50 MG: 25 TABLET ORAL at 21:37

## 2022-12-04 RX ADMIN — ALBUTEROL SULFATE 2.5 MG: 2.5 SOLUTION RESPIRATORY (INHALATION) at 09:01

## 2022-12-04 RX ADMIN — Medication 1 TABLET: at 15:18

## 2022-12-04 RX ADMIN — PROPAFENONE HYDROCHLORIDE 150 MG: 150 TABLET, FILM COATED ORAL at 21:37

## 2022-12-04 RX ADMIN — AMLODIPINE BESYLATE 5 MG: 5 TABLET ORAL at 08:45

## 2022-12-04 RX ADMIN — ALBUTEROL SULFATE 2.5 MG: 2.5 SOLUTION RESPIRATORY (INHALATION) at 14:54

## 2022-12-04 RX ADMIN — LAMOTRIGINE 50 MG: 25 TABLET ORAL at 08:45

## 2022-12-04 RX ADMIN — PROPAFENONE HYDROCHLORIDE 150 MG: 150 TABLET, FILM COATED ORAL at 05:41

## 2022-12-04 RX ADMIN — SODIUM POLYSTYRENE SULFONATE 15 G: 15 SUSPENSION ORAL; RECTAL at 08:46

## 2022-12-04 RX ADMIN — SODIUM BICARBONATE: 84 INJECTION, SOLUTION INTRAVENOUS at 11:58

## 2022-12-04 RX ADMIN — CARVEDILOL 12.5 MG: 6.25 TABLET, FILM COATED ORAL at 18:46

## 2022-12-04 RX ADMIN — Medication 1 TABLET: at 08:45

## 2022-12-04 RX ADMIN — DOXYCYCLINE 100 MG: 100 INJECTION, POWDER, LYOPHILIZED, FOR SOLUTION INTRAVENOUS at 03:23

## 2022-12-04 RX ADMIN — CLONIDINE HYDROCHLORIDE 0.1 MG: 0.1 TABLET ORAL at 08:45

## 2022-12-04 RX ADMIN — FERROUS SULFATE TAB 325 MG (65 MG ELEMENTAL FE) 325 MG: 325 (65 FE) TAB at 08:45

## 2022-12-04 RX ADMIN — FLUDROCORTISONE ACETATE 0.2 MG: 0.1 TABLET ORAL at 11:59

## 2022-12-04 RX ADMIN — DOXYCYCLINE 100 MG: 100 INJECTION, POWDER, LYOPHILIZED, FOR SOLUTION INTRAVENOUS at 15:28

## 2022-12-04 RX ADMIN — CARVEDILOL 12.5 MG: 6.25 TABLET, FILM COATED ORAL at 08:45

## 2022-12-04 RX ADMIN — ATORVASTATIN CALCIUM 40 MG: 40 TABLET, FILM COATED ORAL at 08:45

## 2022-12-04 RX ADMIN — ESCITALOPRAM OXALATE 10 MG: 10 TABLET ORAL at 08:45

## 2022-12-04 RX ADMIN — FUROSEMIDE 40 MG: 10 INJECTION, SOLUTION INTRAMUSCULAR; INTRAVENOUS at 15:39

## 2022-12-04 NOTE — FLOWSHEET NOTE
12/03/22 1919   Vital Signs   Temp 98 °F (36.7 °C)   Temp Source Oral   Heart Rate 76   Heart Rate Source Monitor   Resp 22   /89   MAP (Calculated) 105   BP Location Right lower arm   BP Method Automatic   Patient Position Semi fowlers   Level of Consciousness 0   MEWS Score 2   Pain Assessment   Pain Assessment None - Denies Pain   Oxygen Therapy   SpO2 100 %   O2 Device Nasal cannula   O2 Flow Rate (L/min) 3 L/min   Assessment complete and evening medications administered via peg tube

## 2022-12-04 NOTE — PLAN OF CARE
Problem: Discharge Planning  Goal: Discharge to home or other facility with appropriate resources  12/3/2022 2319 by Suyapa Harper RN  Outcome: Progressing     Problem: Safety - Adult  Goal: Free from fall injury  12/4/2022 1313 by Neris Shelby RN  Outcome: Progressing  12/3/2022 2319 by Suyapa Harper RN  Outcome: Progressing     Problem: Skin/Tissue Integrity  Goal: Absence of new skin breakdown  Description: 1. Monitor for areas of redness and/or skin breakdown  2. Assess vascular access sites hourly  3. Every 4-6 hours minimum:  Change oxygen saturation probe site  4. Every 4-6 hours:  If on nasal continuous positive airway pressure, respiratory therapy assess nares and determine need for appliance change or resting period.   12/4/2022 1313 by Neris Shelby RN  Outcome: Progressing  12/3/2022 2319 by Suyapa Harper RN  Outcome: Progressing     Problem: Nutrition Deficit:  Goal: Optimize nutritional status  Outcome: Progressing

## 2022-12-04 NOTE — PROGRESS NOTES
BP (!) 189/129   Pulse 79   Temp 98.5 °F (36.9 °C) (Oral)   Resp 20   Ht 5' 6\" (1.676 m)   Wt 240 lb (108.9 kg)   SpO2 99%   BMI 38.74 kg/m²     Assessment complete. Meds passed. Pt denies needs at this time. IV insulin given alongside of D10 bolus. PCA notified of Fingerstick regimen.  at bedside, new tube feed given, and ok to keep until PM feeds start per dietician. 20cc of fluid aspirated from PEG prior to meds this am, seems to be tolerating well. Denies any nausea, some abdominal tenderness. Pt got a bath this AM. Will continue to monitor. Bedside Mobility Assessment Tool (BMAT):     Assessment Level 1- Sit and Shake    1. From a semi-reclined position, ask patient to sit up and rotate to a seated position at the side of the bed. Can use the bedrail. 2. Ask patient to reach out and grab your hand and shake making sure patient reaches across his/her midline. Pass- Patient is able to come to a seated position, maintain core strength. Maintains seated balance while reaching across midline. Move on to Assessment Level 2. Assessment Level 2- Stretch and Point   1. With patient in seated position at the side of the bed, have patient place both feet on the floor (or stool) with knees no higher than hips. 2. Ask patient to stretch one leg and straighten the knee, then bend the ankle/flex and point the toes. If appropriate, repeat with the other leg. Pass- Patient is able to demonstrate appropriate quad strength on intended weight bearing limb(s). Move onto Assessment Level 3. Assessment Level 3- Stand   1. Ask patient to elevate off the bed or chair (seated to standing) using an assistive device (cane, bedrail). 2. Patient should be able to raise buttocks off be and hold for a count of five. May repeat once. Fail- Patient unable to demonstrate standing stability. Patient is MOBILITY LEVEL 3. Assessment Level 4- Walk   1. Ask patient to march in place at bedside. 2. Then ask patient to advance step and return each foot. Some medical conditions may render a patient from stepping backwards, use your best clinical judgement. Fail- Patient not able to complete tasks OR requires use of assistive device. Patient is MOBILITY LEVEL 3.        Mobility Level- 2

## 2022-12-04 NOTE — PLAN OF CARE
Problem: Discharge Planning  Goal: Discharge to home or other facility with appropriate resources  12/3/2022 2319 by John Cobb RN  Outcome: Progressing     Problem: Safety - Adult  Goal: Free from fall injury  12/3/2022 2319 by John Cobb RN  Outcome: Progressing  12/3/2022 1927 by Ray Espinoza RN  Outcome: Progressing     Problem: Skin/Tissue Integrity  Goal: Absence of new skin breakdown  Description: 1. Monitor for areas of redness and/or skin breakdown  2. Assess vascular access sites hourly  3. Every 4-6 hours minimum:  Change oxygen saturation probe site  4. Every 4-6 hours:  If on nasal continuous positive airway pressure, respiratory therapy assess nares and determine need for appliance change or resting period.   12/3/2022 2319 by John Cobb RN  Outcome: Progressing  12/3/2022 1927 by Ray Espinoza RN  Outcome: Progressing

## 2022-12-04 NOTE — CARE COORDINATION
Case Management Assessment  Initial Evaluation      Patient Name: Humberto Mendez  YOB: 1940  Diagnosis: Dehydration [E86.0]  Bronchitis [J40]  FARHEEN (acute kidney injury) ([address] [N17.9]  Acute kidney injury ([facility] [address] [N17.9]  Acute respiratory failure with hypoxia ([facility] [address] [J96.01]  Date / Time: 12/3/2022  8:56 AM    Admission status/Date: 12/03/2022  Chart Reviewed: Yes      Patient Interviewed: Yes   Family Interviewed:  Yes - spouse with patients permission      Hospitalization in the last 30 days:  Yes      Health Care Decision Maker :   Primary Decision Maker:  Kaleb Burr - Spouse - [phone]    (CM - must 1st enter selection under Navigator - emergency contact- Health Care Decision Maker Relationship and pick relationship)   Who do you trust or have selected to make healthcare decisions for you    Current PCP: Geena Morgan required for SNF : NA          3 night stay required - WAIVED    ADLS  Support Systems/Care Needs: Spouse/Significant Other  Transportation:  spouse     Meal Preparation: tbe feedings (6PM- 9PM)    Housing  Living Arrangements: Lives w/spouse (he provides 24/7 care)  Steps: ramp  Intent for return to present living arrangements: Yes  Identified Issues:     [address] with [address] : Yes - [facility] (PT/OT, SN)  Type of Home Care Services: Aide Services, PT, OT  Passport/Waiver : No  :                      Phone Number:    Passport/Waiver Services: NA          Durable Medical Equiptment   DME Provider: Lanie (nocturnal O2)Jairon (nocturnal TF and supplies)  Equipment:   Walker__X_Cane___RTS___ BSC__X_Shower Chair__X_Hospital Bed_X__W/C__X__Other__slide board______  02 at __2__Liter(s)---wears(frequency)_nocturnal______ [facility] ___ CPAP___ BiPap_X__   N/A____      Home O2 Use :  Yes  - may need to transition to continuous O2 (pending further testing)  Informed of need for care provider to bring portable home O2 fady on day of discharge for nursing to connect prior to leaving:   Not Indicated  Verbalized agreement/Understanding:   Not Indicated    Community Service Affiliation  Dialysis:  No    Agency:  Location:  Dialysis Schedule:  Phone:   Fax: Other Community Services: (ex:PT/OT,Mental Health,Wound Clinic, Cardio/Pul [address])    DISCHARGE PLAN: Explained Case Management role/services. Chart reviewed. Met with pt and spouse  (via phone at patients request) and explained the role of the CM. Plans to return home in care of spouse who provides 24/7 assistance. BOBY w/ [facility], Lanie (O2) and Jairon (Nocturnal TF). +CM following.

## 2022-12-04 NOTE — PROGRESS NOTES
12/04/22 0309   NIV Type   Equipment Type v60   Mode Bilevel   Mask Type Full face mask   Mask Size Medium   Settings/Measurements   IPAP 22 cmH20   CPAP/EPAP 15 cmH2O   Vt (Measured) 458 mL   Rate Ordered 14   Resp 21   FiO2  30 %   Minute Volume (L/min) 12.1 Liters   Mask Leak (lpm) 3 lpm   Comfort Level Good   Using Accessory Muscles No   SpO2 100   Patient's Home Machine No   Patient Observation   Observations spo2 100% on 30% bipap

## 2022-12-04 NOTE — H&P
History and Physical        HISTORY OF PRESENT ILLNESS: An 82-year-old female with a history of morbid obesity, asthma, obstructive sleep apnea on BiPAP at night, hypertension, Parkinson's disease, status post G-tube placement, presented to the emergency room with shortness of breath and hyperkalemia  Patient was having difficulty breathing for the past day or so on the  was thinking about bringing the patient to the emergency room. At the same time the primary care physician's office called to tell them that her potassium was very high. ER labs showed a potassium of 7.1. Treated medically. Continued on BiPAP given respiratory difficulty. Patient denies having any cough. No fever or chills. No chest pain. Denies nausea vomiting or diarrhea. Patient is allergic to levaquin [levofloxacin], metoclopramide, phenazopyridine, pyridium [phenazopyridine hcl], reglan [metoclopramide hcl], and reglan [metoclopramide hcl].     Past Medical History:   Diagnosis Date    Anemia, unspecified 2010    neg bone marrow    Asthma     Atrial fibrillation ([address]     Baker's cyst     Basal cell carcinoma of left cheek 3/12/2019    Blind left eye     Carotid artery stenoses     Carotid stenosis 2002    left    Cervical spinal cord compression ([address] 11/2010    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-sided sciatica 8/9/2016    CKD (chronic kidney disease) stage 3, GFR 30-59 ml/min ([facility])     Community acquired pneumonia of left lower lobe of lung 9/8/2018    CRI     Detached retina, left     Diverticulosis     DJD (degenerative joint disease)     Edema     chronic    Fatty liver     GERD (gastroesophageal reflux disease)     Hearing loss     Herniated lumbar intervertebral disc     Hx stage 2 sacral decubitus ulcer 4/13/2018    Hyperlipidemia     Hypertension     Hypothyroid 1/27/2015    Morbid obesity with BMI of 40.0-44.9, adult ([address] 2/10/2017    BETSY treated with BiPAP     Parkinson's disease ([address]     Partial symptomatic epilepsy with complex partial seizures, not intractable, without status epilepticus ([facility] [address] 1/24/2020    Restless leg syndrome     Restless legs syndrome     Rheumatic fever 1950    2x IN [place] [place]    Sleep apnea     Tremor, essential 8/16/2010    Type 2 diabetes mellitus without complication ([facility] [address]     Type II or unspecified type diabetes mellitus without mention of complication, not stated as uncontrolled        Past Surgical History:   Procedure Laterality Date    APPENDECTOMY      BRONCHOSCOPY N/A 7/7/2022    BRONCHOSCOPY ALVEOLAR LAVAGE performed by Rosa Butler MD at [place] [address]  7/7/2022    BRONCHOSCOPY THERAPUTIC ASPIRATION INITIAL performed by Rosa Butler MD at [address]      left    CATARACT REMOVAL      CERVICAL FUSION  11/2010    CHOLECYSTECTOMY      COLONOSCOPY N/A 3/8/2020    COLONOSCOPY FLEXIBLE W/ DECOMPRESSION performed by Manuelito Almaguer MD at [address] [facility] [address]  11/8/2022    GASTROSTOMY/PEG TUBE CHANGE performed by Yamilet Puente DO at [address] (CERVIX STATUS UNKNOWN)      IR MIDLINE CATH  10/8/2021    IR MIDLINE CATH 10/8/2021 [address] SPECIAL PROCEDURES    JOINT REPLACEMENT Bilateral 2003    KNEE SURGERY      replacement x 2    OVARY REMOVAL      SHOULDER SURGERY      TONSILLECTOMY      TRACHEOSTOMY N/A 7/15/2022    TRACHEOTOMY WITH PERCUTANEOUS ENDOSCOPIC GASTROSTOMY TUBE PLACEMENT performed by Freeman Lucero MD at [place] [address]  03/17/2020    Esophagitis    UPPER GASTROINTESTINAL ENDOSCOPY N/A 3/17/2020    EGD performed by Poppy Shahid MD at [facility]  11/8/2022    EGD ESOPHAGOGASTRODUODENOSCOPY performed by Yamilet Puente DO at [address]         Scheduled Meds: albuterol  2.5 mg Nebulization BID    acetylcysteine  600 mg Inhalation BID    amLODIPine  5 mg Per G Tube Daily    apixaban  2.5 mg Per G Tube BID    atorvastatin  40 mg Per G Tube Daily    budesonide  250 mcg Nebulization BID    carbidopa-levodopa  1 tablet Per G Tube 4x Daily    carvedilol  12.5 mg Per G Tube BID WC    cetirizine  10 mg Per G Tube Daily    cloNIDine  0.1 mg Per G Tube BID    escitalopram  10 mg Per G Tube Daily    ferrous sulfate  325 mg Per G Tube Daily with breakfast    olodaterol  2 puff Inhalation Daily    lamoTRIgine  50 mg Per G Tube BID    montelukast  10 mg Per G Tube Daily    propafenone  150 mg Per G Tube 3 times per day    sodium chloride flush  5-40 mL IntraVENous 2 times per day    methylPREDNISolone  40 mg IntraVENous Daily    doxycycline (VIBRAMYCIN) IV  100 mg IntraVENous Q12H       Continuous Infusions:   dextrose      sodium chloride         PRN Meds:  glucose, dextrose bolus **OR** dextrose bolus, glucagon (rDNA), dextrose, sodium chloride flush, sodium chloride, ondansetron **OR** ondansetron, polyethylene glycol, acetaminophen **OR** acetaminophen       reports that she has quit smoking.  She has never used smokeless tobacco.    Family History   Problem Relation Age of Onset    Diabetes Mother     Heart Disease Mother     Diabetes Father     Heart Disease Father     Diabetes Sister        Social History     Socioeconomic History    Marital status:      Spouse name: Alexey Walker    Number of children: 2    Years of education: 12    Highest education level: None   Tobacco Use    Smoking status: Former    Smokeless tobacco: Never   Vaping Use    Vaping Use: Never used   Substance and Sexual Activity    Alcohol use: No    Drug use: No    Sexual activity: Not Currently   Social History Narrative    ** Merged History Encounter **          REVIEW OF SYSTEMS:   Constitutional: Negative for fever   HENT: Negative for sore throat   Eyes: Negative for redness   Respiratory: Negative for cough   Cardiovascular: Negative for chest pain   Gastrointestinal: Negative for vomiting, diarrhea   Genitourinary: Negative for hematuria   Musculoskeletal: Negative for arthralgias   Skin: Negative for rash   Neurological: Negative for syncope   Hematological: Negative for adenopathy         Vitals:    12/04/22 0905   BP:    Pulse: 79   Resp: 20   Temp:    SpO2: 99%     Gen: Mild respiratory distress, alert. Eyes:  left eye blindness  ENT: No discharge. Pharynx clear. Neck: Trachea midline. Normal thyroid. Resp:  + accessory muscle use. No crackles. Mild wheezes. No rhonchi. No dullness on percussion. CV: Regular rate. Regular rhythm. No murmur or rub. No edema. GI: Non-tender. Non-distended. No masses. No organomegaly. Normal bowel sounds. No hernia. G tube in place   Skin: Warm and dry. No nodule on exposed extremities. No rash on exposed extremities. Lymph: No cervical LAD. No supraclavicular LAD. M/S: No cyanosis. No joint deformity. No clubbing. Neuro: Awake. Psych: Oriented x 3. No anxiety or agitation. CBC:   Recent Labs     12/02/22  1540 12/03/22  0948 12/04/22  0537   WBC 14.3* 14.5* 10.4   HGB 8.9* 7.8* 7.3*   HCT 28.7* 24.1* 23.1*   MCV 90.4 86.9 90.5    241 221     BMP:   Recent Labs     12/02/22  1540 12/03/22  0948 12/03/22  1036 12/04/22  0537 12/04/22  0714    130*  --  135*  --    K 7.1*  --  3.5 6.2* 6.3*   CL 97* 98*  --  101  --    CO2 27 25  --  26  --    BUN 78* 70*  --  65*  --    CREATININE 2.3* 2.1*  --  2.0*  --      LIVER PROFILE:   Recent Labs     12/02/22  1540 12/03/22  0948 12/03/22  1036   AST 12*  --  9*   ALT <5*  --  <5*   BILITOT <0.2 0.3  --    ALKPHOS 122 106  --      PT/INR: No results for input(s): PROTIME, INR in the last 72 hours. APTT: No results for input(s): APTT in the last 72 hours.   UA:No results for input(s): NITRITE, COLORU, PHUR, LABCAST, WBCUA, RBCUA, MUCUS, TRICHOMONAS, YEAST, BACTERIA, CLARITYU, SPECGRAV,

## 2022-12-04 NOTE — PROGRESS NOTES
RT Inhaler-Nebulizer Bronchodilator Protocol Note    There is a bronchodilator order in the chart from a provider indicating to follow the RT Bronchodilator Protocol and there is an Initiate RT Inhaler-Nebulizer Bronchodilator Protocol order as well (see protocol at bottom of note). CXR Findings:  XR CHEST PORTABLE    Result Date: 12/3/2022  Bronchial wall thickening, perhaps mildly increased, bronchitis or reactive airway disease. No consolidation is identified. The findings from the last RT Protocol Assessment were as follows:   History Pulmonary Disease: Chronic pulmonary disease  Respiratory Pattern: Dyspnea on exertion or RR 21-25 bpm  Breath Sounds: Slightly diminished and/or crackles  Cough: Strong, spontaneous, non-productive  Indication for Bronchodilator Therapy: Decreased or absent breath sounds  Bronchodilator Assessment Score: 6    Aerosolized bronchodilator medication orders have been revised according to the RT Inhaler-Nebulizer Bronchodilator Protocol below. Respiratory Therapist to perform RT Therapy Protocol Assessment initially then follow the protocol. Repeat RT Therapy Protocol Assessment PRN for score 0-3 or on second treatment, BID, and PRN for scores above 3. No Indications - adjust the frequency to every 6 hours PRN wheezing or bronchospasm, if no treatments needed after 48 hours then discontinue using Per Protocol order mode. If indication present, adjust the RT bronchodilator orders based on the Bronchodilator Assessment Score as indicated below. Use Inhaler orders unless patient has one or more of the following: on home nebulizer, not able to hold breath for 10 seconds, is not alert and oriented, cannot activate and use MDI correctly, or respiratory rate 25 breaths per minute or more, then use the equivalent nebulizer order(s) with same Frequency and PRN reasons based on the score.   If a patient is on this medication at home then do not decrease Frequency below that used at home. 0-3 - enter or revise RT bronchodilator order(s) to equivalent RT Bronchodilator order with Frequency of every 4 hours PRN for wheezing or increased work of breathing using Per Protocol order mode. 4-6 - enter or revise RT Bronchodilator order(s) to two equivalent RT bronchodilator orders with one order with BID Frequency and one order with Frequency of every 4 hours PRN wheezing or increased work of breathing using Per Protocol order mode. 7-10 - enter or revise RT Bronchodilator order(s) to two equivalent RT bronchodilator orders with one order with TID Frequency and one order with Frequency of every 4 hours PRN wheezing or increased work of breathing using Per Protocol order mode. 11-13 - enter or revise RT Bronchodilator order(s) to one equivalent RT bronchodilator order with QID Frequency and an Albuterol order with Frequency of every 4 hours PRN wheezing or increased work of breathing using Per Protocol order mode. Greater than 13 - enter or revise RT Bronchodilator order(s) to one equivalent RT bronchodilator order with every 4 hours Frequency and an Albuterol order with Frequency of every 2 hours PRN wheezing or increased work of breathing using Per Protocol order mode. RT to enter RT Home Evaluation for COPD & MDI Assessment order using Per Protocol order mode.     Electronically signed by Bety Barroso Back on 12/4/2022 at 9:11 AM

## 2022-12-04 NOTE — PROGRESS NOTES
Nephrologist state to give 1L of NS and to give it over an hour, and to hold bicarb until after bolus. Nephro doc will see patient later today.

## 2022-12-04 NOTE — PROGRESS NOTES
Comprehensive Nutrition Assessment    Type and Reason for Visit:  Initial, Consult (consult for TF ordering and management)    Nutrition Recommendations/Plan:   Modified TF regimen - ADULT TUBE FEEDING; PEG tube; Diabetic formula - Glucerna 1.5 with a goal rate of 55 ml/hr x 20 hours. Start with goal rate of 55 ml/hr since patient was receiving Glucerna 1.2 at higher goal rate and tolerating without issue. Water flushes, 30 ml every 4 hours for tube patency. Monitor TF rate, intake, and tolerance + water flushes. Monitor respiratory status and plan of care. Please obtain an actual, current weight for this patient - only a stated weight was obtained upon admission. Monitor nutrition-related labs, bowel function, and weight trends. Malnutrition Assessment:  Malnutrition Status:   At risk for malnutrition (12/04/22 1056)    Context:  Acute Illness     Findings of the 6 clinical characteristics of malnutrition:  Energy Intake:  No significant decrease in energy intake  Weight Loss:  No significant weight loss     Body Fat Loss:  Unable to assess     Muscle Mass Loss:  Unable to assess    Fluid Accumulation:  No significant fluid accumulation     Strength:  Not Performed    Nutrition Assessment:    patient was adequately nourished upon admission AEB stable TF regimen PTA and stable weight trends PTA, however, she is at risk for nutritional compromise at this time d/t need for EN as sole source of nutrition, altered nutrition-related labs, and respiratory dysfunction; will modify TF order    Nutrition Related Findings:    patient is A & O x 4; she presented to the hospital with c/o worsening respiratory dysfunction; + PEG tube in place and patient receives home EN from 6pm-9am continuous tube feed - 5 bottles (250 ml each bottle) during feeding period, at rate of 67 ml/hr, and she received 100 ml water flushes every 2 hours, per patient's  to RN; patient has poor dentition and she is [place]; + BM on 12/4/22; she is on solumedrol, eliquis, and iron supplementation at this time; she is from home with her ; hx of Parkinson's disease Wound Type: None       Current Nutrition Intake & Therapies:    Average Meal Intake: NPO  Average Supplements Intake: NPO  Current Tube Feeding (TF) Orders:  Feeding Route: PEG  Formula: Diabetic  Schedule: Continuous  Feeding Regimen: Glucerna 1.5 with a goal rate of 55 ml/hr x 20 hours  Additives/Modulars: None  Water Flushes: 30 ml every 4 hours for tube patency  Current TF & Flush Orders Provides: Glucerna 1.2 with a goal rate of 75 ml/hr x 20 hours = 1500 ml TV, 1800 kcals, 90 g protein, and 1208 ml free water + 30 ml water flushes every 3 hours  Goal TF & Flush Orders Provides: Glucerna 1.5 with a goal rate of 55 ml/hr x 20 hours = 1100 ml TV, 1650 kcals, 91 g protein, and 835 ml free water + 30 ml water flushes every 4 hours for tube patency    Anthropometric Measures:  Height: 5' 6\" (167.6 cm)  Ideal Body Weight (IBW): 130 lbs (59 kg)    Admission Body Weight: 240 lb (108.9 kg) (obtained on 12/3/22; stated weight)  Current Body Weight: 240 lb (108.9 kg) (obtained on 12/3/22; stated weight), 184.6 % IBW.  Weight Source: Stated  Current BMI (kg/m2): 38.8  Usual Body Weight: 237 lb 3 oz (107.6 kg) (obtained on 11/7/22; actual weight)  % Weight Change (Calculated): 1.2  Weight Adjustment For: No Adjustment                 BMI Categories: Obese Class 2 (BMI 35.0 -39.9)    Estimated Daily Nutrient Needs:  Energy Requirements Based On: Kcal/kg  Weight Used for Energy Requirements: Other (Comment) (based on weight obtained on 11/7/22 (237# 3 oz) b/c it is an actual weight)  Energy (kcal/day): 1620 - 1944 kcals based on 15-18 kcals/kg/weight obtained on 11/7/22  Weight Used for Protein Requirements: Ideal  Protein (g/day): 71 - 89 g protein based on 1.2-1.5 g/kg/IBW  Method Used for Fluid Requirements: 1 ml/kcal  Fluid (ml/day): 1620 - 1944 ml    Nutrition Diagnosis:   Inadequate oral intake related to impaired respiratory function, swallowing difficulty, increase demand for energy/nutrients, endocrine dysfuntion as evidenced by NPO or clear liquid status due to medical condition, nutrition support - enteral nutrition, lab values, poor dentition, other (comment) (home EN regimen)    Nutrition Interventions:   Food and/or Nutrient Delivery: Continue NPO, Modify Tube Feeding  Nutrition Education/Counseling: No recommendation at this time  Coordination of Nutrition Care: Continue to monitor while inpatient, Coordination of Care       Goals:     Goals:  Tolerate nutrition support at goal rate, by next RD assessment       Nutrition Monitoring and Evaluation:   Behavioral-Environmental Outcomes: None Identified  Food/Nutrient Intake Outcomes: Enteral Nutrition Intake/Tolerance  Physical Signs/Symptoms Outcomes: Biochemical Data, Hemodynamic Status, Skin, Weight    Discharge Planning:    Enteral Nutrition     Pooja Scott, [address] [address],   Contact: [phone]

## 2022-12-04 NOTE — PROGRESS NOTES
12/03/22 8791   NIV Type   $NIV $Daily Charge   Skin Protection for O2 Device Yes   NIV Started/Stopped On   Equipment Type v60   Mode Bilevel   Mask Type Full face mask   Mask Size Medium   Settings/Measurements   IPAP 22 cmH20   CPAP/EPAP 15 cmH2O   Vt (Measured) 522 mL   Rate Ordered 14   Resp 26   FiO2  30 %   Minute Volume (L/min) 13.6 Liters   Mask Leak (lpm) 1 lpm   Comfort Level Good   Using Accessory Muscles No   SpO2 98   Patient's Home Machine No   Patient Observation   Observations spo2 98% on 30% bipap

## 2022-12-04 NOTE — PROGRESS NOTES
Started Glucerna G feeding at 75mls with 30 mls water flush every 3 hours at 19:00 hrs. Expect to stop at 1500 on 12/04/2022 for a total of 20 hrs. Enfit connecter cleansed. Air mattress pump up.

## 2022-12-04 NOTE — PROGRESS NOTES
Consult has been called to Dr. Pop Castillo on 12/4/22. Spoke with [facility].  9:33 AM    Kp Rogel  12/4/2022

## 2022-12-04 NOTE — ACP (ADVANCE CARE PLANNING)
Advance Care Planning     General Advance Care Planning (ACP) Conversation    Date of Conversation: 12/3/2022  Conducted with: Patient with Decision Making Capacity    Healthcare Decision Maker:    Primary Decision Maker: [facility] - [phone]  Click here to complete Healthcare Decision Makers including selection of the Healthcare Decision Maker Relationship (ie \"Primary\"). Today we documented Decision Maker(s) consistent with ACP documents on file. Content/Action Overview:   Has ACP document(s) on file - reflects the patient's care preferences  Reviewed DNR/DNI and patient elects Full Code (Attempt Resuscitation)    Length of Voluntary ACP Conversation in minutes:  no charge    Korin Fox RN

## 2022-12-04 NOTE — PROGRESS NOTES
12/03/22 2300   RT Protocol   History Pulmonary Disease 2   Respiratory pattern 2   Breath sounds 2   Cough 0   Indications for Bronchodilator Therapy Decreased or absent breath sounds   Bronchodilator Assessment Score 6   RT Inhaler-Nebulizer Bronchodilator Protocol Note    There is a bronchodilator order in the chart from a provider indicating to follow the RT Bronchodilator Protocol and there is an Initiate RT Inhaler-Nebulizer Bronchodilator Protocol order as well (see protocol at bottom of note). CXR Findings:  XR CHEST PORTABLE    Result Date: 12/3/2022  Bronchial wall thickening, perhaps mildly increased, bronchitis or reactive airway disease. No consolidation is identified. The findings from the last RT Protocol Assessment were as follows:   History Pulmonary Disease: Chronic pulmonary disease  Respiratory Pattern: Dyspnea on exertion or RR 21-25 bpm  Breath Sounds: Slightly diminished and/or crackles  Cough: Strong, spontaneous, non-productive  Indication for Bronchodilator Therapy: Decreased or absent breath sounds  Bronchodilator Assessment Score: 6    Aerosolized bronchodilator medication orders have been revised according to the RT Inhaler-Nebulizer Bronchodilator Protocol below. Respiratory Therapist to perform RT Therapy Protocol Assessment initially then follow the protocol. Repeat RT Therapy Protocol Assessment PRN for score 0-3 or on second treatment, BID, and PRN for scores above 3. No Indications - adjust the frequency to every 6 hours PRN wheezing or bronchospasm, if no treatments needed after 48 hours then discontinue using Per Protocol order mode. If indication present, adjust the RT bronchodilator orders based on the Bronchodilator Assessment Score as indicated below.   Use Inhaler orders unless patient has one or more of the following: on home nebulizer, not able to hold breath for 10 seconds, is not alert and oriented, cannot activate and use MDI correctly, or respiratory rate 25 breaths per minute or more, then use the equivalent nebulizer order(s) with same Frequency and PRN reasons based on the score. If a patient is on this medication at home then do not decrease Frequency below that used at home. 0-3 - enter or revise RT bronchodilator order(s) to equivalent RT Bronchodilator order with Frequency of every 4 hours PRN for wheezing or increased work of breathing using Per Protocol order mode. 4-6 - enter or revise RT Bronchodilator order(s) to two equivalent RT bronchodilator orders with one order with BID Frequency and one order with Frequency of every 4 hours PRN wheezing or increased work of breathing using Per Protocol order mode. 7-10 - enter or revise RT Bronchodilator order(s) to two equivalent RT bronchodilator orders with one order with TID Frequency and one order with Frequency of every 4 hours PRN wheezing or increased work of breathing using Per Protocol order mode. 11-13 - enter or revise RT Bronchodilator order(s) to one equivalent RT bronchodilator order with QID Frequency and an Albuterol order with Frequency of every 4 hours PRN wheezing or increased work of breathing using Per Protocol order mode. Greater than 13 - enter or revise RT Bronchodilator order(s) to one equivalent RT bronchodilator order with every 4 hours Frequency and an Albuterol order with Frequency of every 2 hours PRN wheezing or increased work of breathing using Per Protocol order mode.        Electronically signed by Aspen Pardo RCP on 12/3/2022 at 11:58 PM

## 2022-12-04 NOTE — PROGRESS NOTES
Assessment Level 1- Sit and Shake    1. From a semi-reclined position, ask patient to sit up and rotate to a seated position at the side of the bed. Can use the bedrail. 2. Ask patient to reach out and grab your hand and shake making sure patient reaches across his/her midline. Pass- Patient is able to come to a seated position, maintain core strength. Maintains seated balance while reaching across midline. Move on to Assessment Level 2. Assessment Level 2- Stretch and Point   1. With patient in seated position at the side of the bed, have patient place both feet on the floor (or stool) with knees no higher than hips. 2. Ask patient to stretch one leg and straighten the knee, then bend the ankle/flex and point the toes. If appropriate, repeat with the other leg. Pass- Patient is able to demonstrate appropriate quad strength on intended weight bearing limb(s). Move onto Assessment Level 3. Assessment Level 3- Stand   1. Ask patient to elevate off the bed or chair (seated to standing) using an assistive device (cane, bedrail). 2. Patient should be able to raise buttocks off be and hold for a count of five. May repeat once. Fail- Patient unable to demonstrate standing stability. Patient is MOBILITY LEVEL 3. Assessment Level 4- Walk   1. Ask patient to march in place at bedside. 2. Then ask patient to advance step and return each foot. Some medical conditions may render a patient from stepping backwards, use your best clinical judgement. Fail- Patient not able to complete tasks OR requires use of assistive device. Patient is MOBILITY LEVEL 3. Mobility Level- 2    Patient is not able to demonstrated the ability to move from a reclining position to an upright position within the recliner.  however patient is alert, oriented and able to provide informed consent

## 2022-12-05 ENCOUNTER — TELEPHONE (OUTPATIENT)
Dept: FAMILY MEDICINE CLINIC | Age: 82
End: 2022-12-05

## 2022-12-05 ENCOUNTER — CARE COORDINATION (OUTPATIENT)
Dept: CARE COORDINATION | Age: 82
End: 2022-12-05

## 2022-12-05 PROBLEM — E87.5 HYPERKALEMIA: Status: ACTIVE | Noted: 2022-01-01

## 2022-12-05 LAB
ALBUMIN SERPL-MCNC: 2.6 G/DL (ref 3.4–5)
ANION GAP SERPL CALCULATED.3IONS-SCNC: 11 MMOL/L (ref 3–16)
BUN BLDV-MCNC: 65 MG/DL (ref 7–20)
CALCIUM SERPL-MCNC: 7.7 MG/DL (ref 8.3–10.6)
CHLORIDE BLD-SCNC: 99 MMOL/L (ref 99–110)
CO2: 24 MMOL/L (ref 21–32)
CREAT SERPL-MCNC: 1.7 MG/DL (ref 0.6–1.2)
GFR SERPL CREATININE-BSD FRML MDRD: 30 ML/MIN/{1.73_M2}
GLUCOSE BLD-MCNC: 161 MG/DL (ref 70–99)
GLUCOSE BLD-MCNC: 176 MG/DL (ref 70–99)
GLUCOSE BLD-MCNC: 188 MG/DL (ref 70–99)
GLUCOSE BLD-MCNC: 189 MG/DL (ref 70–99)
GLUCOSE BLD-MCNC: 191 MG/DL (ref 70–99)
GLUCOSE BLD-MCNC: 200 MG/DL (ref 70–99)
GLUCOSE BLD-MCNC: 223 MG/DL (ref 70–99)
HCT VFR BLD CALC: 23.9 % (ref 36–48)
HEMOGLOBIN: 7.6 G/DL (ref 12–16)
MAGNESIUM: 2.7 MG/DL (ref 1.8–2.4)
MCH RBC QN AUTO: 28.8 PG (ref 26–34)
MCHC RBC AUTO-ENTMCNC: 31.9 G/DL (ref 31–36)
MCV RBC AUTO: 90.2 FL (ref 80–100)
PDW BLD-RTO: 12.9 % (ref 12.4–15.4)
PERFORMED ON: ABNORMAL
PHOSPHORUS: 4.7 MG/DL (ref 2.5–4.9)
PLATELET # BLD: 221 K/UL (ref 135–450)
PMV BLD AUTO: 10 FL (ref 5–10.5)
POTASSIUM SERPL-SCNC: 4.7 MMOL/L (ref 3.5–5.1)
RBC # BLD: 2.65 M/UL (ref 4–5.2)
SODIUM BLD-SCNC: 134 MMOL/L (ref 136–145)
WBC # BLD: 14.6 K/UL (ref 4–11)

## 2022-12-05 PROCEDURE — 94640 AIRWAY INHALATION TREATMENT: CPT

## 2022-12-05 PROCEDURE — 6370000000 HC RX 637 (ALT 250 FOR IP): Performed by: INTERNAL MEDICINE

## 2022-12-05 PROCEDURE — 85027 COMPLETE CBC AUTOMATED: CPT

## 2022-12-05 PROCEDURE — 1200000000 HC SEMI PRIVATE

## 2022-12-05 PROCEDURE — 83735 ASSAY OF MAGNESIUM: CPT

## 2022-12-05 PROCEDURE — 80048 BASIC METABOLIC PNL TOTAL CA: CPT

## 2022-12-05 PROCEDURE — 84100 ASSAY OF PHOSPHORUS: CPT

## 2022-12-05 PROCEDURE — 2500000003 HC RX 250 WO HCPCS: Performed by: INTERNAL MEDICINE

## 2022-12-05 PROCEDURE — 2580000003 HC RX 258: Performed by: INTERNAL MEDICINE

## 2022-12-05 PROCEDURE — 92610 EVALUATE SWALLOWING FUNCTION: CPT

## 2022-12-05 PROCEDURE — 99232 SBSQ HOSP IP/OBS MODERATE 35: CPT | Performed by: INTERNAL MEDICINE

## 2022-12-05 PROCEDURE — 92526 ORAL FUNCTION THERAPY: CPT

## 2022-12-05 PROCEDURE — 6360000002 HC RX W HCPCS

## 2022-12-05 PROCEDURE — 6360000002 HC RX W HCPCS: Performed by: INTERNAL MEDICINE

## 2022-12-05 PROCEDURE — 36415 COLL VENOUS BLD VENIPUNCTURE: CPT

## 2022-12-05 PROCEDURE — 2500000003 HC RX 250 WO HCPCS

## 2022-12-05 PROCEDURE — 94761 N-INVAS EAR/PLS OXIMETRY MLT: CPT

## 2022-12-05 PROCEDURE — 2580000003 HC RX 258

## 2022-12-05 PROCEDURE — 82040 ASSAY OF SERUM ALBUMIN: CPT

## 2022-12-05 PROCEDURE — 94660 CPAP INITIATION&MGMT: CPT

## 2022-12-05 PROCEDURE — 2700000000 HC OXYGEN THERAPY PER DAY

## 2022-12-05 PROCEDURE — 6370000000 HC RX 637 (ALT 250 FOR IP)

## 2022-12-05 RX ADMIN — INSULIN LISPRO 1 UNITS: 100 INJECTION, SOLUTION INTRAVENOUS; SUBCUTANEOUS at 17:11

## 2022-12-05 RX ADMIN — LAMOTRIGINE 50 MG: 25 TABLET ORAL at 08:51

## 2022-12-05 RX ADMIN — CARVEDILOL 12.5 MG: 6.25 TABLET, FILM COATED ORAL at 08:54

## 2022-12-05 RX ADMIN — SODIUM CHLORIDE, PRESERVATIVE FREE 10 ML: 5 INJECTION INTRAVENOUS at 21:39

## 2022-12-05 RX ADMIN — SODIUM ZIRCONIUM CYCLOSILICATE 10 G: 10 POWDER, FOR SUSPENSION ORAL at 08:50

## 2022-12-05 RX ADMIN — ESCITALOPRAM OXALATE 10 MG: 10 TABLET ORAL at 08:51

## 2022-12-05 RX ADMIN — Medication 1 TABLET: at 14:47

## 2022-12-05 RX ADMIN — CLONIDINE HYDROCHLORIDE 0.1 MG: 0.1 TABLET ORAL at 21:36

## 2022-12-05 RX ADMIN — BUDESONIDE 250 MCG: 0.5 SUSPENSION RESPIRATORY (INHALATION) at 20:10

## 2022-12-05 RX ADMIN — AMLODIPINE BESYLATE 5 MG: 5 TABLET ORAL at 08:51

## 2022-12-05 RX ADMIN — MONTELUKAST SODIUM 10 MG: 10 TABLET, COATED ORAL at 21:37

## 2022-12-05 RX ADMIN — DOXYCYCLINE 100 MG: 100 INJECTION, POWDER, LYOPHILIZED, FOR SOLUTION INTRAVENOUS at 15:00

## 2022-12-05 RX ADMIN — PROPAFENONE HYDROCHLORIDE 150 MG: 150 TABLET, FILM COATED ORAL at 21:36

## 2022-12-05 RX ADMIN — Medication 1 TABLET: at 08:51

## 2022-12-05 RX ADMIN — ALBUTEROL SULFATE 2.5 MG: 2.5 SOLUTION RESPIRATORY (INHALATION) at 13:42

## 2022-12-05 RX ADMIN — PROPAFENONE HYDROCHLORIDE 150 MG: 150 TABLET, FILM COATED ORAL at 05:12

## 2022-12-05 RX ADMIN — OLODATEROL RESPIMAT INHALATION SPRAY 2 PUFF: 2.5 SPRAY, METERED RESPIRATORY (INHALATION) at 08:51

## 2022-12-05 RX ADMIN — CETIRIZINE HYDROCHLORIDE 10 MG: 10 TABLET ORAL at 08:51

## 2022-12-05 RX ADMIN — ACETYLCYSTEINE 600 MG: 200 INHALANT RESPIRATORY (INHALATION) at 20:10

## 2022-12-05 RX ADMIN — APIXABAN 2.5 MG: 5 TABLET, FILM COATED ORAL at 08:50

## 2022-12-05 RX ADMIN — SODIUM BICARBONATE: 84 INJECTION, SOLUTION INTRAVENOUS at 01:12

## 2022-12-05 RX ADMIN — CLONIDINE HYDROCHLORIDE 0.1 MG: 0.1 TABLET ORAL at 08:51

## 2022-12-05 RX ADMIN — DOXYCYCLINE 100 MG: 100 INJECTION, POWDER, LYOPHILIZED, FOR SOLUTION INTRAVENOUS at 03:16

## 2022-12-05 RX ADMIN — Medication 1 TABLET: at 21:36

## 2022-12-05 RX ADMIN — ACETYLCYSTEINE 600 MG: 200 INHALANT RESPIRATORY (INHALATION) at 08:51

## 2022-12-05 RX ADMIN — ALBUTEROL SULFATE 2.5 MG: 2.5 SOLUTION RESPIRATORY (INHALATION) at 08:44

## 2022-12-05 RX ADMIN — APIXABAN 2.5 MG: 5 TABLET, FILM COATED ORAL at 21:37

## 2022-12-05 RX ADMIN — ATORVASTATIN CALCIUM 40 MG: 40 TABLET, FILM COATED ORAL at 08:51

## 2022-12-05 RX ADMIN — BUDESONIDE 250 MCG: 0.5 SUSPENSION RESPIRATORY (INHALATION) at 08:51

## 2022-12-05 RX ADMIN — LAMOTRIGINE 50 MG: 25 TABLET ORAL at 21:36

## 2022-12-05 RX ADMIN — FERROUS SULFATE TAB 325 MG (65 MG ELEMENTAL FE) 325 MG: 325 (65 FE) TAB at 08:54

## 2022-12-05 RX ADMIN — CARVEDILOL 12.5 MG: 6.25 TABLET, FILM COATED ORAL at 17:10

## 2022-12-05 RX ADMIN — PROPAFENONE HYDROCHLORIDE 150 MG: 150 TABLET, FILM COATED ORAL at 14:47

## 2022-12-05 RX ADMIN — METHYLPREDNISOLONE SODIUM SUCCINATE 40 MG: 40 INJECTION, POWDER, FOR SOLUTION INTRAMUSCULAR; INTRAVENOUS at 08:49

## 2022-12-05 RX ADMIN — ALBUTEROL SULFATE 2.5 MG: 2.5 SOLUTION RESPIRATORY (INHALATION) at 20:10

## 2022-12-05 RX ADMIN — Medication 1 TABLET: at 17:10

## 2022-12-05 RX ADMIN — SODIUM BICARBONATE: 84 INJECTION, SOLUTION INTRAVENOUS at 13:16

## 2022-12-05 ASSESSMENT — PAIN SCALES - GENERAL: PAINLEVEL_OUTOF10: 0

## 2022-12-05 NOTE — PROGRESS NOTES
Patient tube feed continues at 55 ml/hr, tolerating with out difficulty. PEG tube flush after medications. IVF discontinue as per order. 02 remains at 1.5 l/nc, with 02 sat 94%. No distress noted at this time. Call light in reach.

## 2022-12-05 NOTE — PROGRESS NOTES
12/05/22 0311   NIV Type   Equipment Type v60   Mode Bilevel   Mask Type Full face mask   Mask Size Medium   Settings/Measurements   IPAP 22 cmH20   CPAP/EPAP 15 cmH2O   Vt (Measured) 423 mL   Rate Ordered 14   Resp 23   FiO2  30 %   Minute Volume (L/min) 8.9 Liters   Mask Leak (lpm) 1 lpm   Comfort Level Good   Using Accessory Muscles No   SpO2 100   Patient's Home Machine No   Patient Observation   Observations spo2 100% on 30% bipap

## 2022-12-05 NOTE — CONSULTS
Thank you to requesting provider: Dr. Dorothy Felix , for asking us to see Melyssa Silva  Reason for consultation:  Acute Kidney Injury and Hyperkalemia   Chief Complaint:  Shortness of breath    History of Presenting Illness      [de-identified] y/o with CKD stage 3b with baseline more in the 1.3 - 1.7 range admitted with shortness of breath. We have been asked to see her for acute kidney injury and hyperkalemia. She was sent to the hospital with a K of 7.1. This was medically managed but today it is back up to 6.3. She is making urine. BP stable. She is on 1.5 liters NC.  CXR with no airspace disease.         Past Medical/Surgical History      Active Ambulatory Problems     Diagnosis Date Noted    GERD (gastroesophageal reflux disease)     Essential hypertension     Hyperlipidemia     Restless leg syndrome     Anemia of chronic disease     Postural dizziness 02/20/2013    Moderate persistent asthma 08/09/2016    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-sided sciatica 08/09/2016    Morbid obesity with BMI of 40.0-44.9, adult ([address] 02/10/2017    Type 2 diabetes mellitus with stage 4 chronic kidney disease, without long-term current use of insulin ([address] 02/10/2017    CAD (coronary artery disease)     Parkinson disease ([address] 05/22/2018    Severe obstructive sleep apnea     Hypothyroidism 01/27/2015    Hepatic steatosis     Diverticulosis     Blind left eye     L carotid artery stenosis s/p CEA 01/01/2002    Septicemia ([address] 09/08/2018    Paroxysmal atrial fibrillation (HCC)     COPD (chronic obstructive pulmonary disease) ([address] 12/10/2018    Acute respiratory failure with hypoxia (HCC)     Diastolic congestive heart failure ([address] 01/17/2019    Partial symptomatic epilepsy with complex partial seizures, not intractable, without status epilepticus ([address] 01/24/2020    Chronic kidney disease 03/01/2020    Acute kidney injury superimposed on CKD [facility])     Colonic pseudoobstruction     Sialadenitis     Chest pain 09/27/2021 Pulmonary HTN ([address]     Mitral valve insufficiency     C. difficile colitis 10/07/2021    Laceration of left lower extremity     Pulmonary nodules     Sensorineural hearing loss, bilateral 04/07/2022    Aspiration pneumonia of left lower lobe (HCC)     Severe protein-calorie malnutrition ([address] 06/24/2022    Chronic anemia     Thrush 07/05/2022    MRSA colonization 07/05/2022    BETSY treated with BiPAP     Metabolic acidosis     Pulmonary congestion     Mucus plugging of bronchi     Atrial fibrillation, chronic (HCC)     Mild protein-calorie malnutrition ([address] 07/18/2022    Ventilator dependent ([address] 08/24/2022    Gastrostomy tube dysfunction ([address] 11/07/2022     Resolved Ambulatory Problems     Diagnosis Date Noted    Tremor, essential 08/16/2010    Asthma exacerbation 03/09/2011    Muscular deconditioning 10/04/2012    Acute hemorrhagic conjunctivitis 02/20/2013    Syncope and collapse 02/27/2017    Orthostatic hypotension 02/27/2017    Acute cystitis without hematuria 02/27/2017    DM (diabetes mellitus), secondary, uncontrolled, w/neurologic complic     HTN (hypertension), benign     Essential tremor     Chest pain     BETSY (obstructive sleep apnea) 08/09/2017    Obesity, Class III, BMI 40-49.9 (morbid obesity) ([address] 08/09/2017    Hx stage 2 sacral decubitus ulcer 04/13/2018    Cervical spinal cord compression s/p ACDF 11/01/2010    Elevated BUN:Cr ratio 09/08/2018    Elevated brain natriuretic peptide (BNP) level 09/08/2018    Community acquired pneumonia of left lower lobe of lung 09/08/2018    Acute hypoxemic respiratory failure ([address] 10/44/0789    Acute diastolic congestive heart failure ([address] 09/08/2018    Chest tightness 09/08/2018    CHF (congestive heart failure), NYHA class I, acute on chronic, combined ([address] 09/26/2018    COPD exacerbation ([address] 12/10/2018    Persistent asthma with acute exacerbation     Acute metabolic encephalopathy     Leukocytosis     Basal cell carcinoma of left cheek 03/12/2019 Acute respiratory failure with hypoxemia ([facility]) 05/22/2019    Pneumonia due to infectious organism 06/10/2019    Anemia due to chronic blood loss 07/15/2019    Intractable pain 03/01/2020    Fall     Hyperkalemia     Skin tear of left hand without complication     Contusion of rib on right side     Pleural effusion     Chest pain on breathing     Abdominal distension     Neck swelling     Moderate protein-calorie malnutrition ([facility] 03/18/2020    UTI (urinary tract infection) 09/28/2021    Dehydration     Shortness of breath 06/17/2022     Past Medical History:   Diagnosis Date    Anemia, unspecified 2010    Asthma     Atrial fibrillation ([facility])     Baker's cyst     Carotid artery stenoses     CKD (chronic kidney disease) stage 3, GFR 30-59 ml/min ([facility])     CRI     Detached retina, left     DJD (degenerative joint disease)     Edema     Fatty liver     Hearing loss     Herniated lumbar intervertebral disc     Hypertension     Hypothyroid 1/27/2015    Parkinson's disease ([facility]     Restless legs syndrome     Rheumatic fever 1950    Sleep apnea     Type 2 diabetes mellitus without complication ([facility])     Type II or unspecified type diabetes mellitus without mention of complication, not stated as uncontrolled          Review of Systems     Constitutional:  No weight loss, no fever/chills  Eyes:  No eye pain, no eye redness  Cardiovascular:  No chest pain, no worsening of edema  Respiratory:  No hemoptysis, no stridor  Gastrointestinal:  No blood in stool, no n/v, no diarrhea  Genitoruinary:  No hematuria, no difficulty with urination  Musculoskeletal:  No joint swelling, no redness  Integumentary:  No Rash, no itching  Neurological:  No focal weakness, No new sensory deficit  Psychiatric:  No depression, no confusion  Endocrine:  No polyuria, no polydipsia       Medications      Reviewed in EMR     Allergies     Levaquin [levofloxacin], Metoclopramide, Phenazopyridine, Pyridium [phenazopyridine hcl], Reglan [metoclopramide hcl], and Reglan [metoclopramide hcl]      Family History       Negative for Kidney Disease    Social History      Social History     Socioeconomic History    Marital status:      Spouse name: Anny    Number of children: 2    Years of education: 12    Highest education level: None   Tobacco Use    Smoking status: Former    Smokeless tobacco: Never   Vaping Use    Vaping Use: Never used   Substance and Sexual Activity    Alcohol use: No    Drug use: No    Sexual activity: Not Currently   Social History Narrative    ** Merged History Encounter **            Physical Exam     Blood pressure (!) 143/75, pulse 72, temperature 97.9 °F (36.6 °C), temperature source Oral, resp. rate 14, height 5' 6\" (1.676 m), weight 240 lb (108.9 kg), SpO2 98 %, not currently breastfeeding.     General:  NAD, ill appearing   HEENT:  PERRL, EOMI  Neck:  Supple, normal range of movement  Chest:  CTAB, good respiratory effort, good air movement  CV:  Regular, no rub   Abdomen:  NTND, soft, +BS, no hepatosplenomegaly  Extremities:  No peripheral edema  Neurological:  Moving all four extremities, CN II-XII grossly intact  Lymphatics:  No palpable lymph nodes  Skin:  No rash, no jaundice  Psychiatric:  Flat affect, alert     Data     Recent Labs     12/02/22  1540 12/03/22  0948 12/04/22  0537   WBC 14.3* 14.5* 10.4   HGB 8.9* 7.8* 7.3*   HCT 28.7* 24.1* 23.1*   MCV 90.4 86.9 90.5    241 221     Recent Labs     12/02/22  1540 12/03/22  0948 12/03/22  1036 12/03/22  1036 12/04/22  0537 12/04/22  0714 12/04/22  1144    130*  --   --  135*  --  139   K 7.1*  --  3.5   < > 6.2* 6.3* 6.3*   CL 97* 98*  --   --  101  --  104   CO2 27 25  --   --  26  --  27   GLUCOSE 110* 118*  --   --  138*  --  110*   MG 3.30*  --  1.70*  --   --   --  3.10*   BUN 78* 70*  --   --  65*  --  64*   CREATININE 2.3* 2.1*  --   --  2.0*  --  2.0*   LABGLOM 21* 23*  --   --  25*  --  25*    < > = values in this interval not displayed. Assessment:    Hyperkalemia:  On diovan and lasix at home. Worsening renal function which appears to be related to volume depletion  K was 7.1, doubt that the 3.5 was accurate. Now at 6.3. She is making urine   Etiology:  ARB and FARHEEN  Acute Kidney Injury:  KDIGO stage 1  Etiology:  Volume depletion and ARB  Chronic Kidney Disease:  Stage 3b  Baseline mostly 1.3 - 1.7. Multiple episodes of FARHEEN  Shortness of breath:   History of asthma and BETSY  Acute exacerbation  CXR with no airspace disease  COVID negative / Influenza negative   Anemia contributing  Anemia:  Acute drop. Also with hyperkalemia  ?  GI bleed  Hypermagnesemia:  Typically related to volume contraction or FARHEEN     Plan:    IV fluids  Holding ARB  Holding lasix  Monitor respiratory status  Bolus 2 liters NS  IV fluid with HCO3 to augment potassium excretion  Given a dose of florinef and IV lasix with IV fluids for forced diuresis to get more distal tubular delivery of sodium and stimulation of ROMK  Lokelma  Hopefully will avoid dialysis  Seems to be coming down with medical management     Thank you for asking us to participate in the management of your patient, please do not hesitate to contact me for any concerns regarding my recommendations as outlined above.    -----------------------------  Kelly Ricci M.D.   [facility]

## 2022-12-05 NOTE — CARE COORDINATION
ACM completed chart review and noted that patient is admitted into the hospital. ACM will follow up once d/c home.

## 2022-12-05 NOTE — PROGRESS NOTES
RT Nebulizer Bronchodilator Protocol Note    There is a bronchodilator order in the chart from a provider indicating to follow the RT Bronchodilator Protocol and there is an Initiate RT Bronchodilator Protocol order as well (see protocol at bottom of note). CXR Findings:  XR CHEST PORTABLE    Result Date: 12/3/2022  Bronchial wall thickening, perhaps mildly increased, bronchitis or reactive airway disease. No consolidation is identified. The findings from the last RT Protocol Assessment were as follows:  Smoking: Chronic pulmonary disease  Respiratory Pattern: Dyspnea on exertion or RR 21-25 bpm  Breath Sounds: Intermittent or unilateral wheezes  Cough: Strong, spontaneous, non-productive  Indication for Bronchodilator Therapy: Decreased or absent breath sounds  Bronchodilator Assessment Score: 8    Aerosolized bronchodilator medication orders have been revised according to the RT Nebulizer Bronchodilator Protocol below. Respiratory Therapist to perform RT Therapy Protocol Assessment initially then follow the protocol. Repeat RT Therapy Protocol Assessment PRN for score 0-3 or on second treatment, BID, and PRN for scores above 3. No Indications - adjust the frequency to every 6 hours PRN wheezing or bronchospasm, if no treatments needed after 48 hours then discontinue using Per Protocol order mode. If indication present, adjust the RT bronchodilator orders based on the Bronchodilator Assessment Score as indicated below. If a patient is on this medication at home then do not decrease Frequency below that used at home. 0-3 - enter or revise RT bronchodilator order(s) to equivalent RT Bronchodilator order with Frequency of every 4 hours PRN for wheezing or increased work of breathing using Per Protocol order mode.        4-6 - enter or revise RT Bronchodilator order(s) to two equivalent RT bronchodilator orders with one order with BID Frequency and one order with Frequency of every 4 hours PRN wheezing or increased work of breathing using Per Protocol order mode. 7-10 - enter or revise RT Bronchodilator order(s) to two equivalent RT bronchodilator orders with one order with TID Frequency and one order with Frequency of every 4 hours PRN wheezing or increased work of breathing using Per Protocol order mode. 11-13 - enter or revise RT Bronchodilator order(s) to one equivalent RT bronchodilator order with QID Frequency and an Albuterol order with Frequency of every 4 hours PRN wheezing or increased work of breathing using Per Protocol order mode. Greater than 13 - enter or revise RT Bronchodilator order(s) to one equivalent RT bronchodilator order with every 4 hours Frequency and an Albuterol order with Frequency of every 2 hours PRN wheezing or increased work of breathing using Per Protocol order mode. RT to enter RT Home Evaluation for COPD & MDI Assessment order using Per Protocol order mode.     Electronically signed by Anabella Milian RCP on 12/5/2022 at 8:56 AM

## 2022-12-05 NOTE — PROGRESS NOTES
RT Inhaler-Nebulizer Bronchodilator Protocol Note    There is a bronchodilator order in the chart from a provider indicating to follow the RT Bronchodilator Protocol and there is an Initiate RT Inhaler-Nebulizer Bronchodilator Protocol order as well (see protocol at bottom of note). CXR Findings:  XR CHEST PORTABLE    Result Date: 12/3/2022  Bronchial wall thickening, perhaps mildly increased, bronchitis or reactive airway disease. No consolidation is identified. The findings from the last RT Protocol Assessment were as follows:   History Pulmonary Disease: Chronic pulmonary disease  Respiratory Pattern: Dyspnea on exertion or RR 21-25 bpm  Breath Sounds: Intermittent or unilateral wheezes  Cough: Strong, spontaneous, non-productive  Indication for Bronchodilator Therapy: Wheezing associated with pulm disorder  Bronchodilator Assessment Score: 8    Aerosolized bronchodilator medication orders have been revised according to the RT Inhaler-Nebulizer Bronchodilator Protocol below. Respiratory Therapist to perform RT Therapy Protocol Assessment initially then follow the protocol. Repeat RT Therapy Protocol Assessment PRN for score 0-3 or on second treatment, BID, and PRN for scores above 3. No Indications - adjust the frequency to every 6 hours PRN wheezing or bronchospasm, if no treatments needed after 48 hours then discontinue using Per Protocol order mode. If indication present, adjust the RT bronchodilator orders based on the Bronchodilator Assessment Score as indicated below. Use Inhaler orders unless patient has one or more of the following: on home nebulizer, not able to hold breath for 10 seconds, is not alert and oriented, cannot activate and use MDI correctly, or respiratory rate 25 breaths per minute or more, then use the equivalent nebulizer order(s) with same Frequency and PRN reasons based on the score.   If a patient is on this medication at home then do not decrease Frequency below that used at home. 0-3 - enter or revise RT bronchodilator order(s) to equivalent RT Bronchodilator order with Frequency of every 4 hours PRN for wheezing or increased work of breathing using Per Protocol order mode. 4-6 - enter or revise RT Bronchodilator order(s) to two equivalent RT bronchodilator orders with one order with BID Frequency and one order with Frequency of every 4 hours PRN wheezing or increased work of breathing using Per Protocol order mode. 7-10 - enter or revise RT Bronchodilator order(s) to two equivalent RT bronchodilator orders with one order with TID Frequency and one order with Frequency of every 4 hours PRN wheezing or increased work of breathing using Per Protocol order mode. 11-13 - enter or revise RT Bronchodilator order(s) to one equivalent RT bronchodilator order with QID Frequency and an Albuterol order with Frequency of every 4 hours PRN wheezing or increased work of breathing using Per Protocol order mode. Greater than 13 - enter or revise RT Bronchodilator order(s) to one equivalent RT bronchodilator order with every 4 hours Frequency and an Albuterol order with Frequency of every 2 hours PRN wheezing or increased work of breathing using Per Protocol order mode.        Electronically signed by Joanne Feliciano RCP on 12/4/2022 at 8:34 PM

## 2022-12-05 NOTE — TELEPHONE ENCOUNTER
[facility] pharmacy faxed over a paper on formoterol nebulizer stating needs to be resent with diagnosis code so we can bill med B.. Please advise

## 2022-12-05 NOTE — PROGRESS NOTES
Progress Note    Admit Date:  12/3/2022    [de-identified] yo female with PMHx morbid obesity, asthma, BETSY on BiPAP nightly, HTN, atrial fibrillation, parkinson's disease, s/p G-tube placement  Presented to ED on 12/3 with SOB and hyperkalemia, noted on routine lab work ordered by PCP  Admitted to medical for management of hyperkalemia and FARHEEN and SOB      Subjective:  Ms. Cal Maloney reports feeling that overall her breathing has improved, although she did have increased shortness of breath when working with PT/OT. Otherwise, she denies fever, chills, n/v/d, abdominal pain or urinary symptoms. Objective:   Patient Vitals for the past 4 hrs:   BP Temp Temp src Pulse Resp SpO2   12/05/22 0853 -- -- -- 71 20 95 %   12/05/22 0840 117/66 97.9 °F (36.6 °C) Axillary 66 20 97 %          Intake/Output Summary (Last 24 hours) at 12/5/2022 1236  Last data filed at 12/5/2022 0533  Gross per 24 hour   Intake 10 ml   Output 700 ml   Net -690 ml       Physical Exam:  Gen: No distress. Alert. Eyes: +L eye blindness with corneal clouding   Neck: No JVD. Trachea midline. Resp: No accessory muscle use. No crackles. No rhonchi. +Mild wheezing throughout lung fields   CV: Regular rate. Regular rhythm. No murmur. No rub. Non-pitting bilateral lower extremity edema. Capillary Refill: Brisk,< 3 seconds   Peripheral Pulses: +2 palpable, equal bilaterally   GI: Non-tender. Non-distended. Normal bowel sounds. G-tube in place. Skin: Warm and dry. No nodule on exposed extremities. No rash on exposed extremities. M/S: No cyanosis. No joint deformity. No clubbing. Neuro: Awake. Grossly nonfocal    Psych: Oriented x 3. No anxiety or agitation. Bharathi Andujar MD have reviewed the chart on Cassandra Meyers and personally interviewed and examined patient, reviewed the data (labs and imaging) and after discussion with my PA formulated the plan. Agree with note with the following edits.     HPI:     I reviewed the patient's Past Medical History, Past Surgical History, Medications, and Allergies. No acute issues  Improving hypoxia     General: elderly female up in bed,   Healed trach scar    Awake, alert and oriented. Appears to be not in any distress  Mucous Membranes:  Pink , anicteric  Left opaque eye  Neck: No JVD, no carotid bruit, no thyromegaly  Chest:  Clear to auscultation bilaterally, no added sounds  Cardiovascular:  RRR S1S2 heard, no murmurs or gallops  Abdomen:  Soft, obese, PEG noted undistended, non tender, no organomegaly, BS present  Extremities: No edema or cyanosis.  Distal pulses well felt  Neurological : grossly normal with gen weakness      Scheduled Meds:   albuterol  2.5 mg Nebulization TID    sodium zirconium cyclosilicate  10 g Oral TID    insulin lispro  0-4 Units SubCUTAneous Q4H    acetylcysteine  600 mg Inhalation BID    amLODIPine  5 mg Per G Tube Daily    apixaban  2.5 mg Per G Tube BID    atorvastatin  40 mg Per G Tube Daily    budesonide  250 mcg Nebulization BID    carbidopa-levodopa  1 tablet Per G Tube 4x Daily    carvedilol  12.5 mg Per G Tube BID WC    cetirizine  10 mg Per G Tube Daily    cloNIDine  0.1 mg Per G Tube BID    escitalopram  10 mg Per G Tube Daily    ferrous sulfate  325 mg Per G Tube Daily with breakfast    olodaterol  2 puff Inhalation Daily    lamoTRIgine  50 mg Per G Tube BID    montelukast  10 mg Per G Tube Daily    propafenone  150 mg Per G Tube 3 times per day    sodium chloride flush  5-40 mL IntraVENous 2 times per day    methylPREDNISolone  40 mg IntraVENous Daily    doxycycline (VIBRAMYCIN) IV  100 mg IntraVENous Q12H       Continuous Infusions:   dextrose      IV infusion builder 100 mL/hr at 12/05/22 0112    dextrose      sodium chloride         PRN Meds:  glucose, dextrose bolus **OR** dextrose bolus, glucagon (rDNA), dextrose, glucose, dextrose bolus **OR** dextrose bolus, glucagon (rDNA), dextrose, sodium chloride flush, sodium chloride, ondansetron **OR** ondansetron, polyethylene glycol, acetaminophen **OR** acetaminophen      Data:  CBC:   Recent Labs     12/03/22  0948 12/04/22  0537 12/05/22  0625   WBC 14.5* 10.4 14.6*   HGB 7.8* 7.3* 7.6*   HCT 24.1* 23.1* 23.9*   MCV 86.9 90.5 90.2    221 221     BMP:   Recent Labs     12/04/22  0537 12/04/22  0714 12/04/22  1144 12/05/22  0625 12/05/22  0744   *  --  139 134*  --    K 6.2* 6.3* 6.3*  --  4.7     --  104 99  --    CO2 26  --  27 24  --    PHOS  --   --   --  4.7  --    BUN 65*  --  64* 65*  --    CREATININE 2.0*  --  2.0* 1.7*  --      LIVER PROFILE:   Recent Labs     12/02/22  1540 12/03/22  0948 12/03/22  1036   AST 12*  --  9*   ALT <5*  --  <5*   BILITOT <0.2 0.3  --    ALKPHOS 122 106  --      Lab Results   Component Value Date    INR 1.61 (H) 06/19/2022    INR 1.09 04/09/2021    INR 1.27 (H) 12/10/2018    PROTIME 19.0 (H) 06/19/2022    PROTIME 12.7 04/09/2021    PROTIME 14.4 (H) 12/10/2018       CULTURES  Covid/Flu negative   Respiratory panel pending      RADIOLOGY  XR CHEST PORTABLE   Final Result   Bronchial wall thickening, perhaps mildly increased, bronchitis or reactive   airway disease. No consolidation is identified.                Assessment/Plan:  #Acute kidney injury on CKD stage IIIb  #Hyperkalemia  -creatinine of 2.3 on admission --> 1.7 today; baseline 1.3-1.7    -potassium of 7.1 on admission --> 4.7 today after bicarb fluids and lokelma  -likely 2/2 ARB and dehydration  -hold Lasix and Diovan  -nephrology consulted with recs to continue IVF with HCO3  -can resume lasix today          #Acute asthma exacerbation  -tachypneic on admission with reported SOB  -covid/flu negative  -respiratory panel pending   -CXR WNL  -placed on Solu-Medrol and doxycycline  -inhaled bronchodilators  -BiPAP nightly but no daytime O2 supplementation at baseline  -maintaining O2 saturations >95% on 1.5 L O2 via NC, wean as tolerated       #Iron deficiency anemia  -Hgb 7.6  monitor  -trend H/H    #Obstructive sleep apnea  -on BiPAP at night  -use as needed during the day     #Paroxysmal atrial fibrillation  -continue Rythmol and Eliquis     #Hypertension  -continue Norvasc Coreg and clonidine  -hold Diovan and Lasix    #Carotid artery stenosis   -s/p CEA      #History of seizures  -continue Lamictal     #Morbid obesity  Weight loss helps      DVT Prophylaxis: eliquis  Diet: Diet NPO  ADULT TUBE FEEDING; PEG; Diabetic; Continuous; 55; No; 30; Q 4 hours  Code Status: Full Code    Bety Plaza PA-C  12/05/22  1:30 PM    Agree with above  Changes made to note    Yazmin Wilhelm MD,12/5/2022 7:35 PM

## 2022-12-05 NOTE — TELEPHONE ENCOUNTER
Started care on 11/24/2022, needing verbal sign-age of home care, and PT.     Dave Mustafa [phone]

## 2022-12-05 NOTE — PROGRESS NOTES
Speech Language Pathology    Speech Language Pathology  Clinical Bedside Swallow Assessment  Facility/Department: [address] [place] [address] [facility]    Recommendations: Instrumentation: Yes. MBSS is warranted to further assess oropharyngeal structures and function and prior to initiation of possible PO intake (exception of ice chips)  Diet recommendation: NPO exception of low volume ice chips after oral care; meds/nutrition via G-tube  Risk management: oral care q4 hrs to reduce adverse affects in the event of aspiration and general aspiration precautions    Addendum 1500: SLP spoke with pt's  via phone regarding rationale for instrumental swallow study to correlate clinical findings and prior to possible PO intake. SLP reviewed indications and contraindications for both MBSS and FEES (pt has had both studies completed in the past). Pt's  declined FEES, agreeable to MBSS. Will f/u with pt tomorrow and attempt MBSS as pt is able and appropriate and if study OK'd by MD. Salcidoenio Ramirezflorencia  : 1940 ([de-identified] y.o.)   MRN: [id number]  ROOM: [address] [place] [address]  ADMISSION DATE: 12/3/2022  PATIENT DIAGNOSIS(ES): Dehydration [E86.0]  Bronchitis [J40]  FARHEEN (acute kidney injury) ([address] [N17.9]  Acute kidney injury ([address] [N17.9]  Acute respiratory failure with hypoxia ([address] [J96.01]  Chief Complaint   Patient presents with    Shortness of Breath     Pt brought in by EMS for SOB, EMS reported that pt was seen by PCP yesterday, blood work showed high potassium. Pt wears bipap machine at night. Pt reports feeling SOB after taking Bipap off this morning.  EMS gave breathing treatment enroute     Abnormal Lab     Patient Active Problem List    Diagnosis Date Noted    Acute kidney injury ([address] 2022    Gastrostomy tube dysfunction ([address] 2022    Ventilator dependent ([address] 2022    Mild protein-calorie malnutrition ([address] 2022    Atrial fibrillation, chronic (HCC)     Mucus plugging of bronchi Pulmonary congestion     Thrush 07/05/2022    MRSA colonization 07/05/2022    BETSY treated with BiPAP     Metabolic acidosis     Chronic anemia     Severe protein-calorie malnutrition ([address] 06/24/2022    Aspiration pneumonia of left lower lobe (HCC)     Sensorineural hearing loss, bilateral 04/07/2022    Laceration of left lower extremity     Pulmonary nodules     C. difficile colitis 10/07/2021    Chest pain 09/27/2021    Pulmonary HTN ([address]     Mitral valve insufficiency     Sialadenitis     Colonic pseudoobstruction     Acute kidney injury superimposed on CKD ([address]     Chronic kidney disease 03/01/2020    Partial symptomatic epilepsy with complex partial seizures, not intractable, without status epilepticus ([address] 43/24/0687    Diastolic congestive heart failure ([address] 01/17/2019    Acute respiratory failure with hypoxia (HCC)     COPD (chronic obstructive pulmonary disease) ([address] 12/10/2018    Paroxysmal atrial fibrillation ([address]     Septicemia ([address] 09/08/2018    Severe obstructive sleep apnea     Hepatic steatosis     Diverticulosis     Blind left eye     Parkinson disease ([address] 05/22/2018    CAD (coronary artery disease)     Morbid obesity with BMI of 40.0-44.9, adult ([address] 02/10/2017    Type 2 diabetes mellitus with stage 4 chronic kidney disease, without long-term current use of insulin ([address] 02/10/2017    Moderate persistent asthma 08/09/2016    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-sided sciatica 08/09/2016    Hypothyroidism 01/27/2015    Postural dizziness 02/20/2013    Anemia of chronic disease     GERD (gastroesophageal reflux disease)     Essential hypertension     Hyperlipidemia     Restless leg syndrome     L carotid artery stenosis s/p CEA 01/01/2002     Past Medical History:   Diagnosis Date    Anemia, unspecified 2010    neg bone marrow    Asthma     Atrial fibrillation ([address]     Baker's cyst     Basal cell carcinoma of left cheek 3/12/2019    Blind left eye     Carotid artery stenoses Carotid stenosis 2002    left    Cervical spinal cord compression (HCC) 11/2010    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-sided sciatica 8/9/2016    CKD (chronic kidney disease) stage 3, GFR 30-59 ml/min (HCC)     Community acquired pneumonia of left lower lobe of lung 9/8/2018    CRI     Detached retina, left     Diverticulosis     DJD (degenerative joint disease)     Edema     chronic    Fatty liver     GERD (gastroesophageal reflux disease)     Hearing loss     Herniated lumbar intervertebral disc     Hx stage 2 sacral decubitus ulcer 4/13/2018    Hyperlipidemia     Hypertension     Hypothyroid 1/27/2015    Morbid obesity with BMI of 40.0-44.9, adult ([facility] 2/10/2017    BETSY treated with BiPAP     Parkinson's disease ([facility]     Partial symptomatic epilepsy with complex partial seizures, not intractable, without status epilepticus ([address] 1/24/2020    Restless leg syndrome     Restless legs syndrome     Rheumatic fever 1950    2x IN [facility] [place]    Sleep apnea     Tremor, essential 8/16/2010    Type 2 diabetes mellitus without complication ([facility]     Type II or unspecified type diabetes mellitus without mention of complication, not stated as uncontrolled      Past Surgical History:   Procedure Laterality Date    APPENDECTOMY      BRONCHOSCOPY N/A 7/7/2022    BRONCHOSCOPY ALVEOLAR LAVAGE performed by Madyson Corbett MD at [place] [address]  7/7/2022    BRONCHOSCOPY [address] performed by Madyson Corbett MD at [facility]      left    CATARACT REMOVAL      CERVICAL FUSION  11/2010    CHOLECYSTECTOMY      COLONOSCOPY N/A 3/8/2020    COLONOSCOPY FLEXIBLE W/ DECOMPRESSION performed by Merry Funez MD at [facility] [address]  11/8/2022    GASTROSTOMY/PEG TUBE CHANGE performed by Toyin Gordon DO at [address] (CERVIX STATUS UNKNOWN)      IR MIDLINE CATH  10/8/2021    IR MIDLINE CATH 10/8/2021 [facility] SPECIAL PROCEDURES    JOINT REPLACEMENT Bilateral 2003    KNEE SURGERY      replacement x 2    OVARY REMOVAL      SHOULDER SURGERY      TONSILLECTOMY      TRACHEOSTOMY N/A 7/15/2022    TRACHEOTOMY WITH PERCUTANEOUS ENDOSCOPIC GASTROSTOMY TUBE PLACEMENT performed by Nelida Perez MD at [address]  03/17/2020    Esophagitis    UPPER GASTROINTESTINAL ENDOSCOPY N/A 3/17/2020    EGD performed by Ayanna Kim MD at [address]  11/8/2022    EGD ESOPHAGOGASTRODUODENOSCOPY performed by George Grande DO at [address]       Allergies   Allergen Reactions    Levaquin [Levofloxacin]      Pt was given in er and developed hives and redness    Metoclopramide     Phenazopyridine     Pyridium [Phenazopyridine Hcl]     Reglan [Metoclopramide Hcl]     Reglan [Metoclopramide Hcl]      tremors       DATE ONSET: Pt admitted to [facility] on 12/3/22    Date of Evaluation: 12/5/2022   Evaluating Therapist: JAZZ Bolton    Chart Reviewed: : [x] Yes [] No    Current Diet: Diet NPO  ADULT TUBE FEEDING; PEG; Diabetic; Continuous; 55; No; 30; Q 4 hours    Recent Chest Radiography: [x] Chest XR   [] CT of Chest  Date: 12/3/22  Impressions  Impression   Bronchial wall thickening, perhaps mildly increased, bronchitis or reactive   airway disease. No consolidation is identified. Pain: The patient does not complain of pain    Reason for Referral  Cassandra Meyers was referred for a bedside swallow evaluation to assess the efficiency of their swallow function, identify signs and symptoms of aspiration and make recommendations regarding safe dietary consistencies, effective compensatory strategies, and safe eating environment.     Assessment    Medical record review/interview: Per MD H&P: \"An 28-year-old female with a history of morbid obesity, asthma, obstructive sleep apnea on BiPAP at night, hypertension, Parkinson's disease, status post G-tube placement, presented to the emergency room with shortness of breath and hyperkalemia  Patient was having difficulty breathing for the past day or so on the  was thinking about bringing the patient to the emergency room. At the same time the primary care physician's office called to tell them that her potassium was very high. ER labs showed a potassium of 7.1. Treated medically. Continued on BiPAP given respiratory difficulty. Patient denies having any cough. No fever or chills. No chest pain. Denies nausea vomiting or diarrhea. Patient is allergic to levaquin [levofloxacin], metoclopramide, phenazopyridine, pyridium [phenazopyridine hcl], reglan [metoclopramide hcl], and reglan [metoclopramide hcl]\". Patient Complaints: pt has been largely NPO exception of low volume ice chips and PO trials with SLP since July 2022, meds/nutrition via G-tube (placed 7/15/22 per chart)  Odynophagia: [] Yes [] No  Globus Sensation: [] Yes [] No  SOB with PO intake: [] Yes [] No  Increased WOB with PO intake: [] Yes [] No  Reflux Sx's: [] Yes [] No  Weight loss: [] Yes [] No  Coughing/Choking with PO intake: [] Yes [] No  Reduced Appetite: [] Yes [] No  Trouble with Mastication:  [] Yes [] No  Avoidance of Certain Foods:  [] Yes [] No  Premature Satiety:  [] Yes [] No  Recent PNA:  [] Yes [] No  Recurring PNA:  [] Yes [] No  Changes in vocal quality: [] Yes [] No    Baseline Method of Oral Meds:  [] Whole with liquid    [] Cut with liquid    [] Whole with puree    [] Cut in puree    [] Crushed in puree    [] Crushed in liquid    [x] Via TF    Additional Reported Symptoms/Complaints: Pt admitted d/t FARHEEN, acute respiratory failure with hypoxia, bronchitis, dehydration. Pt with multiple recent hospitalizations d/t G-tube dysfunction (11/30/22). Pt has PMHx of BETSY, Parkinson's disease, and GERD per chart.    BSE completed 11/8/22 with recommendations for continued NPO with completion of instrumental swallow study via FEES or MBSS to correlate clinical findings & prior to possible PO intake. Per BSE completed 11/8/22, \"Pt had FEES completed 7/7/22 which recommended puree solids (IDDSI 4) with mildly (nectar) thick liquids, crushable meds crushed in puree, ice chips OK after oral care. Pt was discharged from [address] [place] [address] on 7/12/22 d/t re-intubation. Per pt's  at bedside, pt required trach and PEG placement (7/15/22 per chart), pt was transferred to [facility] from [facility]. The pt is now at [facility] where she receives ST \"three times a week\" per pt and pt's . They reported that the pt is allowed ice chips after oral care and receives some PO with SLP. Pt's  reported that the pt has had 2 mobile MBSS completed at [facility] with most recent study \"about a month ago\"\".       Predisposing dysphagia risk factors: Parkinson's Disease, Other chronic respiratory illness, Hx of Trach/PEG, GERD, Hx of dysphagia, and Hx of aspiration  Clinical signs of possible chronic dysphagia: current use of PEG/TF, hx of dysphagia, hx of aspiration, and hx of tracheostomy  Precipitating dysphagia risk factors: reduced physical mobility, increased O2 demands, and suspected disuse atrophy    Vitals/labs:     Vitals:    12/05/22 0407 12/05/22 0534 12/05/22 0840 12/05/22 0853   BP: 130/73  117/66    Pulse: 69  66 71   Resp: 22  20 20   Temp: 97.6 °F (36.4 °C)  97.9 °F (36.6 °C)    TempSrc: Axillary  Axillary    SpO2: 98%  97% 95%   Weight:  240 lb (108.9 kg)     Height:            CBC:   Recent Labs     12/05/22  0625   WBC 14.6*   HGB 7.6*         BMP:  Recent Labs     12/05/22  0625 12/05/22  0744   *  --    K  --  4.7   CL 99  --    CO2 24  --    BUN 65*  --    CREATININE 1.7*  --    GLUCOSE 161*  --           Cranial nerve exam:   CN V (trigeminal): ophthalmic, maxillary, and mandibular facial sensation- WFL  CN VII (facial): Reduced  CN IX/X (glossopharyngeal/vagus): MPT: DNT; pitch range: DNT; vocal quality: Adequate and weak; cough: Weak- perceptually, Non-Productive, and Dry  CN XII (hypoglossal): Reduced and Lingual fasciculations upon protrusion    Laryngeal function exam:   Secretions: n/a  Vocal quality: See CN exam above  MPT: See CN exam above  S/Z ratio: DNT  Pitch range: See CN exam above  Cough: See CN exam above    Oral Care Status:    [x] Oral Care [facility]  [] Poor oral care status  [] Edentulous  [] Upper Dentures  [] Lower Dentures  [] Missing/Broken Teeth  [] Evidence of dental cavities/carries    PO trials:   Oral care completed with toothbrush/toothpaste prior to PO trials. Pt required assistance from SLP with oral care. Ice: x6; no anterior bolus loss, adequate bolus control and manipulation, no clinical s/s of aspiration. IDDSI 0 (thin):   - 5cc bolus (tsp): x3 <1/2 tsp; no anterior bolus loss , suspect premature bolus loss into pharynx with audible swallow initiation noted, suspect delayed swallow onset, immediate throat clearing x1, and vitals stable    3 oz water: DNT    Impressions:    Clinical signs of oropharyngeal dysphagia likely acute-on-chronic related to hx of dysphagia, reduced physical mobility, increased O2 demands, fatigue, disuse atrophy, generalized weakness, prolonged use of alt means of nutrition, Parkinson's Disease, and the aging swallow. Swallow prognosis is fair. Instrumental swallow study is indicated. Given suspected disuse atrophy of swallow musculature, reduced physical mobility, poor oral care status, immunocompromised state, willingness to participate in therapy, and caregiver support, pt is not safe for oral diet at this time    Recommendations: Instrumentation: Yes.  MBSS is warranted to further assess oropharyngeal structures and function  Diet recommendation: NPO exception of low volume ice chips after oral care; meds/nutrition via G-tube  Risk management: oral care q4 hrs to reduce adverse affects in the event of aspiration and general aspiration precautions    Addendum 1500: SLP spoke with pt's  via phone regarding rationale for instrumental swallow study to correlate clinical findings and prior to possible PO intake. SLP reviewed indications and contraindications for both MBSS and FEES. Pt's  declined FEES, agreeable to MBSS.   Will f/u with pt tomorrow and attempt MBSS as pt is able and appropriate and if OK'd by MD.    Prognosis: Fair - Good    Recommended Intervention:  [x] Dysphagia tx  [] Videostroboscopy                      [x] NPO   [x] MBS       [] Speech/Cog Eval    [] Therapeutic PO Trials     [x] Ice Chips   [] Other:  [x] FEES                                                 Dysphagia Therapeutic Intervention:  []  Bolus control Exercises  []  Oral Motor Exercises  []  [employer] Protocol  []  Thermal Stimulation  [x]  Oral Care    []  Vital Stim/NMES  []  Laryngeal Exercises  [x]  Patient/Family Education  []  Pharyngeal Exercises  [x]  Therapeutic PO trials with SLP  [x]  Diet tolerance monitoring  []  Other:     Referrals:  [] ENT    [] PT  [] Pulmonology [] GI  [] Neurology  [] RD  [] OT   []     Goals:  Short Term Goals:  Timeframe for Short Term Goals: (5 days, 12/10/22)  Goal 1: The patient will tolerate recommended diet with no clinical s/s of aspiration 5/5  Goal 2: The patient/caregiver will demonstrate understanding of compensatory swallow strategies, for improved swallow safety  Goal 3: The patient will tolerate instrumental assessment when able     Long Term Goals:   Timeframe for Long Term Goals: (7 days, 12/12/22)  Goal 1: The patient will tolerate least restrictive diet with no clinical s/s of aspiration or worsening respiratory/pulmonary status    Treatment:  Skilled instruction completed with patient re: evidenced based practice regarding recommendations and POC, importance of oral care to reduce adverse affects in the event of aspiration, and instruction of recommended compensatory strategies developed based upon clinical exam. Pt able to recall/demonstrate compensatory strategies with (min, mod, max) cues. Pt Education: SLP educated the patient re: Role of SLP, rationale for completion of assessment, recommendations, POC, rationale for FEES, and rationale for MBS  Pt Education Response: verbalized understanding, would benefit from ongoing education, RN aware, and caregiver aware    Duration/Frequency of Tx: 3-5x/wk    Individuals Consulted:   []  Patient     []  NP         []  RN   []  RD                   []  MD      []  Family Member                        []  PA    []  Other:      Safety Devices / Report:  []  All fall risk precautions in place []  Safety handoff completed with RN  []  Bed alarm in place  []  Left in bed     []  Chair alarm in place  []  Left in chair   []  Call light in reach   []  Other:        Total Treatment Time / Charges       Time in Time out Total Time / units   Swallow Eval/Tx Time  [id number] 35 min / 2 units     Signature:  Zoila Mcmahan M.S. [id number] [facility]  Speech-language pathologist  [id number]

## 2022-12-05 NOTE — FLOWSHEET NOTE
12/04/22 1950   Vital Signs   Temp 97.9 °F (36.6 °C)   Temp Source Oral   Heart Rate 72   Heart Rate Source Monitor   Resp 14   BP (!) 143/75   MAP (Calculated) 98   MAP (mmHg) 72   BP Location Right upper arm   BP Method Automatic   Patient Position Semi fowlers   Level of Consciousness 0   MEWS Score 0   Oxygen Therapy   SpO2 98 %   O2 Device Nasal cannula     Patient A+Ox4, vitals and assessments done at this time. Bed in lowest position, call light within reach.

## 2022-12-05 NOTE — PLAN OF CARE
Problem: Nutrition Deficit:  Goal: Optimize nutritional status  Outcome: Progressing   Tube feeding continues as ordered, and patient tolerating well.

## 2022-12-05 NOTE — CARE COORDINATION
[facility]  Patient is active with [facility], Will follow for discharge to home with orders to resume care.     Cheryle Boast RN, BSN CTN  [facility] [phone]

## 2022-12-05 NOTE — PROGRESS NOTES
12/04/22 2328   NIV Type   Skin Assessment Clean, dry, & intact   Skin Protection for O2 Device Yes   NIV Started/Stopped (S)  On   Equipment Type V60   Mode Bilevel   Mask Type Full face mask   Mask Size Medium   Settings/Measurements   IPAP 22 cmH20   CPAP/EPAP 15 cmH2O   Vt (Measured) 468 mL   Rate Ordered 14   Resp 27   FiO2  30 %   Mask Leak (lpm) 10 lpm   Comfort Level Good   Using Accessory Muscles No   SpO2 98   Patient's Home Machine No   Alarm Settings   Alarms On Y

## 2022-12-05 NOTE — CARE COORDINATION
CM spoke with pt and she states that she lives with her spouse and plans to return. Pt states that her spouse provides assistance with ADLs for pt. Pt plans to return home with BOBY with [facility]. Pt is active with [facility] for tube feeds    Pt is active with [facility] for home O2 (nocturnal O2).

## 2022-12-05 NOTE — PLAN OF CARE
Bedside swallow evaluation completed this date.     Fredy Ceron M.S. [address] [facility]  Speech-language pathologist  [id number]

## 2022-12-05 NOTE — PROGRESS NOTES
[facility] Progress Note           Subjective/   [de-identified]y.o. year old female who we are seeing in consultation for FARHEEN/CKD-3, Hyperkalemia. HPI:  Renal function back at baseline, K normalized. Intake reduced, on TF's. ROS:  +weak, no fevers. Objective/   GEN:  Chronically ill, /66   Pulse 71   Temp 97.9 °F (36.6 °C) (Axillary)   Resp 20   Ht 5' 6\" (1.676 m)   Wt 240 lb (108.9 kg)   SpO2 95%   BMI 38.74 kg/m²   HEENT: non-icteric, no JVD  CV: S1, S2 without m/r/g; no LE edema  RESP: CTA B without w/r/r; breathing wnl  ABD: +bs, soft, nt, no hsm  SKIN: warm, no rashes    Data/  Recent Labs     12/03/22  0948 12/04/22  0537 12/05/22  0625   WBC 14.5* 10.4 14.6*   HGB 7.8* 7.3* 7.6*   HCT 24.1* 23.1* 23.9*   MCV 86.9 90.5 90.2    221 221     Recent Labs     12/03/22  1036 12/04/22  0537 12/04/22  0714 12/04/22  1144 12/05/22  0625 12/05/22  0744   NA  --  135*  --  139 134*  --    K 3.5 6.2* 6.3* 6.3*  --  4.7   CL  --  101  --  104 99  --    CO2  --  26  --  27 24  --    GLUCOSE  --  138*  --  110* 161*  --    PHOS  --   --   --   --  4.7  --    MG 1.70*  --   --  3.10* 2.70*  --    BUN  --  65*  --  64* 65*  --    CREATININE  --  2.0*  --  2.0* 1.7*  --    LABGLOM  --  25*  --  25* 30*  --        Assessment/     Hyperkalemia:  On diovan and lasix at home. Worsening renal function which appears to be related to volume depletion  K was 7.1, doubt that the 3.5 was accurate. Now normalized with medical treatment. She is making urine   Etiology:  ARB and FARHEEN  Acute Kidney Injury:  KDIGO stage 1  Etiology:  Volume depletion and ARB  Chronic Kidney Disease:  Stage 3b  Baseline mostly 1.3 - 1.7. Multiple episodes of FARHEEN  Shortness of breath:   History of asthma and BETSY  Acute exacerbation  CXR with no airspace disease  COVID negative / Influenza negative   Anemia contributing  Anemia:  Acute drop. Also with hyperkalemia  ?  GI bleed  Hypermagnesemia:  Typically related to volume contraction or FARHEEN     Plan/     - Trial off IVF's today  - Holding ARB, lasix  - Trend labs, bp's, & urine output    ____________________________________  Tayler Arreola MD  [facility]  [email or web address]  Office: [phone]

## 2022-12-05 NOTE — PLAN OF CARE
Problem: Discharge Planning  Goal: Discharge to home or other facility with appropriate resources  Outcome: Progressing     Problem: Safety - Adult  Goal: Free from fall injury  12/4/2022 2247 by Cynthia Osullivan RN  Outcome: Progressing  12/4/2022 1313 by Reginald Barrientos RN  Outcome: Progressing     Problem: Skin/Tissue Integrity  Goal: Absence of new skin breakdown  Description: 1. Monitor for areas of redness and/or skin breakdown  2. Assess vascular access sites hourly  3. Every 4-6 hours minimum:  Change oxygen saturation probe site  4. Every 4-6 hours:  If on nasal continuous positive airway pressure, respiratory therapy assess nares and determine need for appliance change or resting period.   12/4/2022 2247 by Cynthia Osullivan RN  Outcome: Progressing  12/4/2022 1313 by Reginald Barrientos RN  Outcome: Progressing     Problem: Nutrition Deficit:  Goal: Optimize nutritional status  12/4/2022 2247 by Cynthia Osullivan RN  Outcome: Progressing  12/4/2022 1313 by Reginald Barrientos RN  Outcome: Progressing

## 2022-12-06 LAB
ALBUMIN SERPL-MCNC: 2.8 G/DL (ref 3.4–5)
ALP BLD-CCNC: 88 U/L (ref 40–129)
ALT SERPL-CCNC: 7 U/L (ref 10–40)
ANION GAP SERPL CALCULATED.3IONS-SCNC: 5 MMOL/L (ref 3–16)
AST SERPL-CCNC: 10 U/L (ref 15–37)
BASOPHILS ABSOLUTE: 0 K/UL (ref 0–0.2)
BASOPHILS RELATIVE PERCENT: 0 %
BILIRUB SERPL-MCNC: <0.2 MG/DL (ref 0–1)
BILIRUBIN DIRECT: <0.2 MG/DL (ref 0–0.3)
BILIRUBIN, INDIRECT: ABNORMAL MG/DL (ref 0–1)
BUN BLDV-MCNC: 67 MG/DL (ref 7–20)
CALCIUM SERPL-MCNC: 8.1 MG/DL (ref 8.3–10.6)
CHLORIDE BLD-SCNC: 95 MMOL/L (ref 99–110)
CO2: 32 MMOL/L (ref 21–32)
CREAT SERPL-MCNC: 1.6 MG/DL (ref 0.6–1.2)
EOSINOPHILS ABSOLUTE: 0 K/UL (ref 0–0.6)
EOSINOPHILS RELATIVE PERCENT: 0 %
GFR SERPL CREATININE-BSD FRML MDRD: 32 ML/MIN/{1.73_M2}
GLUCOSE BLD-MCNC: 140 MG/DL (ref 70–99)
GLUCOSE BLD-MCNC: 154 MG/DL (ref 70–99)
GLUCOSE BLD-MCNC: 168 MG/DL (ref 70–99)
GLUCOSE BLD-MCNC: 169 MG/DL (ref 70–99)
GLUCOSE BLD-MCNC: 192 MG/DL (ref 70–99)
GLUCOSE BLD-MCNC: 197 MG/DL (ref 70–99)
GLUCOSE BLD-MCNC: 206 MG/DL (ref 70–99)
HCT VFR BLD CALC: 21.5 % (ref 36–48)
HEMOGLOBIN: 7 G/DL (ref 12–16)
LYMPHOCYTES ABSOLUTE: 0.5 K/UL (ref 1–5.1)
LYMPHOCYTES RELATIVE PERCENT: 4.4 %
MCH RBC QN AUTO: 28.5 PG (ref 26–34)
MCHC RBC AUTO-ENTMCNC: 32.4 G/DL (ref 31–36)
MCV RBC AUTO: 87.8 FL (ref 80–100)
MONOCYTES ABSOLUTE: 0.7 K/UL (ref 0–1.3)
MONOCYTES RELATIVE PERCENT: 5.7 %
NEUTROPHILS ABSOLUTE: 10.2 K/UL (ref 1.7–7.7)
NEUTROPHILS RELATIVE PERCENT: 89.9 %
PDW BLD-RTO: 12.8 % (ref 12.4–15.4)
PERFORMED ON: ABNORMAL
PLATELET # BLD: 218 K/UL (ref 135–450)
PMV BLD AUTO: 10.1 FL (ref 5–10.5)
POTASSIUM REFLEX MAGNESIUM: 4.8 MMOL/L (ref 3.5–5.1)
RBC # BLD: 2.44 M/UL (ref 4–5.2)
SODIUM BLD-SCNC: 132 MMOL/L (ref 136–145)
TOTAL PROTEIN: 6.1 G/DL (ref 6.4–8.2)
WBC # BLD: 11.3 K/UL (ref 4–11)

## 2022-12-06 PROCEDURE — 85025 COMPLETE CBC W/AUTO DIFF WBC: CPT

## 2022-12-06 PROCEDURE — 94640 AIRWAY INHALATION TREATMENT: CPT

## 2022-12-06 PROCEDURE — 97530 THERAPEUTIC ACTIVITIES: CPT

## 2022-12-06 PROCEDURE — 2580000003 HC RX 258

## 2022-12-06 PROCEDURE — 99233 SBSQ HOSP IP/OBS HIGH 50: CPT | Performed by: INTERNAL MEDICINE

## 2022-12-06 PROCEDURE — 6370000000 HC RX 637 (ALT 250 FOR IP): Performed by: INTERNAL MEDICINE

## 2022-12-06 PROCEDURE — 97161 PT EVAL LOW COMPLEX 20 MIN: CPT

## 2022-12-06 PROCEDURE — 2500000003 HC RX 250 WO HCPCS

## 2022-12-06 PROCEDURE — 6360000002 HC RX W HCPCS

## 2022-12-06 PROCEDURE — 6360000002 HC RX W HCPCS: Performed by: INTERNAL MEDICINE

## 2022-12-06 PROCEDURE — 80048 BASIC METABOLIC PNL TOTAL CA: CPT

## 2022-12-06 PROCEDURE — 6370000000 HC RX 637 (ALT 250 FOR IP)

## 2022-12-06 PROCEDURE — 97166 OT EVAL MOD COMPLEX 45 MIN: CPT

## 2022-12-06 PROCEDURE — 2700000000 HC OXYGEN THERAPY PER DAY

## 2022-12-06 PROCEDURE — 1200000000 HC SEMI PRIVATE

## 2022-12-06 PROCEDURE — 80076 HEPATIC FUNCTION PANEL: CPT

## 2022-12-06 PROCEDURE — 94761 N-INVAS EAR/PLS OXIMETRY MLT: CPT

## 2022-12-06 PROCEDURE — 36415 COLL VENOUS BLD VENIPUNCTURE: CPT

## 2022-12-06 PROCEDURE — 94660 CPAP INITIATION&MGMT: CPT

## 2022-12-06 RX ORDER — FUROSEMIDE 40 MG/1
40 TABLET ORAL DAILY
Status: DISCONTINUED | OUTPATIENT
Start: 2022-12-06 | End: 2022-12-07 | Stop reason: HOSPADM

## 2022-12-06 RX ADMIN — FUROSEMIDE 40 MG: 40 TABLET ORAL at 11:12

## 2022-12-06 RX ADMIN — BUDESONIDE 250 MCG: 0.5 SUSPENSION RESPIRATORY (INHALATION) at 20:23

## 2022-12-06 RX ADMIN — PROPAFENONE HYDROCHLORIDE 150 MG: 150 TABLET, FILM COATED ORAL at 21:14

## 2022-12-06 RX ADMIN — Medication 1 TABLET: at 09:02

## 2022-12-06 RX ADMIN — ALBUTEROL SULFATE 2.5 MG: 2.5 SOLUTION RESPIRATORY (INHALATION) at 07:40

## 2022-12-06 RX ADMIN — ATORVASTATIN CALCIUM 40 MG: 40 TABLET, FILM COATED ORAL at 09:01

## 2022-12-06 RX ADMIN — INSULIN LISPRO 1 UNITS: 100 INJECTION, SOLUTION INTRAVENOUS; SUBCUTANEOUS at 17:58

## 2022-12-06 RX ADMIN — SODIUM CHLORIDE, PRESERVATIVE FREE 10 ML: 5 INJECTION INTRAVENOUS at 21:15

## 2022-12-06 RX ADMIN — APIXABAN 2.5 MG: 5 TABLET, FILM COATED ORAL at 09:01

## 2022-12-06 RX ADMIN — CARVEDILOL 12.5 MG: 6.25 TABLET, FILM COATED ORAL at 09:01

## 2022-12-06 RX ADMIN — Medication 1 TABLET: at 15:45

## 2022-12-06 RX ADMIN — ALBUTEROL SULFATE 2.5 MG: 2.5 SOLUTION RESPIRATORY (INHALATION) at 20:23

## 2022-12-06 RX ADMIN — FERROUS SULFATE TAB 325 MG (65 MG ELEMENTAL FE) 325 MG: 325 (65 FE) TAB at 11:10

## 2022-12-06 RX ADMIN — DOXYCYCLINE 100 MG: 100 INJECTION, POWDER, LYOPHILIZED, FOR SOLUTION INTRAVENOUS at 15:50

## 2022-12-06 RX ADMIN — CETIRIZINE HYDROCHLORIDE 10 MG: 10 TABLET ORAL at 09:02

## 2022-12-06 RX ADMIN — PROPAFENONE HYDROCHLORIDE 150 MG: 150 TABLET, FILM COATED ORAL at 15:45

## 2022-12-06 RX ADMIN — ACETYLCYSTEINE 600 MG: 200 INHALANT RESPIRATORY (INHALATION) at 20:23

## 2022-12-06 RX ADMIN — LAMOTRIGINE 50 MG: 25 TABLET ORAL at 09:02

## 2022-12-06 RX ADMIN — OLODATEROL RESPIMAT INHALATION SPRAY 2 PUFF: 2.5 SPRAY, METERED RESPIRATORY (INHALATION) at 07:40

## 2022-12-06 RX ADMIN — PROPAFENONE HYDROCHLORIDE 150 MG: 150 TABLET, FILM COATED ORAL at 06:18

## 2022-12-06 RX ADMIN — ACETYLCYSTEINE 600 MG: 200 INHALANT RESPIRATORY (INHALATION) at 07:40

## 2022-12-06 RX ADMIN — BUDESONIDE 250 MCG: 0.5 SUSPENSION RESPIRATORY (INHALATION) at 07:40

## 2022-12-06 RX ADMIN — AMLODIPINE BESYLATE 5 MG: 5 TABLET ORAL at 09:02

## 2022-12-06 RX ADMIN — CLONIDINE HYDROCHLORIDE 0.1 MG: 0.1 TABLET ORAL at 09:01

## 2022-12-06 RX ADMIN — APIXABAN 2.5 MG: 5 TABLET, FILM COATED ORAL at 21:13

## 2022-12-06 RX ADMIN — LAMOTRIGINE 50 MG: 25 TABLET ORAL at 21:14

## 2022-12-06 RX ADMIN — CLONIDINE HYDROCHLORIDE 0.1 MG: 0.1 TABLET ORAL at 21:14

## 2022-12-06 RX ADMIN — DOXYCYCLINE 100 MG: 100 INJECTION, POWDER, LYOPHILIZED, FOR SOLUTION INTRAVENOUS at 03:09

## 2022-12-06 RX ADMIN — MONTELUKAST SODIUM 10 MG: 10 TABLET, COATED ORAL at 21:14

## 2022-12-06 RX ADMIN — ALBUTEROL SULFATE 2.5 MG: 2.5 SOLUTION RESPIRATORY (INHALATION) at 15:49

## 2022-12-06 RX ADMIN — SODIUM CHLORIDE, PRESERVATIVE FREE 10 ML: 5 INJECTION INTRAVENOUS at 08:56

## 2022-12-06 RX ADMIN — CARVEDILOL 12.5 MG: 6.25 TABLET, FILM COATED ORAL at 17:55

## 2022-12-06 RX ADMIN — ESCITALOPRAM OXALATE 10 MG: 10 TABLET ORAL at 09:24

## 2022-12-06 RX ADMIN — Medication 1 TABLET: at 17:57

## 2022-12-06 RX ADMIN — Medication 1 TABLET: at 21:15

## 2022-12-06 RX ADMIN — METHYLPREDNISOLONE SODIUM SUCCINATE 40 MG: 40 INJECTION, POWDER, FOR SOLUTION INTRAMUSCULAR; INTRAVENOUS at 08:59

## 2022-12-06 NOTE — PROGRESS NOTES
RT Inhaler-Nebulizer Bronchodilator Protocol Note    There is a bronchodilator order in the chart from a provider indicating to follow the RT Bronchodilator Protocol and there is an Initiate RT Inhaler-Nebulizer Bronchodilator Protocol order as well (see protocol at bottom of note). CXR Findings:  No results found. The findings from the last RT Protocol Assessment were as follows:   History Pulmonary Disease: Chronic pulmonary disease  Respiratory Pattern: Dyspnea on exertion or RR 21-25 bpm  Breath Sounds: Slightly diminished and/or crackles  Cough: Strong, spontaneous, non-productive  Indication for Bronchodilator Therapy: Decreased or absent breath sounds  Bronchodilator Assessment Score: 6    Aerosolized bronchodilator medication orders have been revised according to the RT Inhaler-Nebulizer Bronchodilator Protocol below. Respiratory Therapist to perform RT Therapy Protocol Assessment initially then follow the protocol. Repeat RT Therapy Protocol Assessment PRN for score 0-3 or on second treatment, BID, and PRN for scores above 3. No Indications - adjust the frequency to every 6 hours PRN wheezing or bronchospasm, if no treatments needed after 48 hours then discontinue using Per Protocol order mode. If indication present, adjust the RT bronchodilator orders based on the Bronchodilator Assessment Score as indicated below. Use Inhaler orders unless patient has one or more of the following: on home nebulizer, not able to hold breath for 10 seconds, is not alert and oriented, cannot activate and use MDI correctly, or respiratory rate 25 breaths per minute or more, then use the equivalent nebulizer order(s) with same Frequency and PRN reasons based on the score. If a patient is on this medication at home then do not decrease Frequency below that used at home.     0-3 - enter or revise RT bronchodilator order(s) to equivalent RT Bronchodilator order with Frequency of every 4 hours PRN for wheezing or increased work of breathing using Per Protocol order mode. 4-6 - enter or revise RT Bronchodilator order(s) to two equivalent RT bronchodilator orders with one order with BID Frequency and one order with Frequency of every 4 hours PRN wheezing or increased work of breathing using Per Protocol order mode. 7-10 - enter or revise RT Bronchodilator order(s) to two equivalent RT bronchodilator orders with one order with TID Frequency and one order with Frequency of every 4 hours PRN wheezing or increased work of breathing using Per Protocol order mode. 11-13 - enter or revise RT Bronchodilator order(s) to one equivalent RT bronchodilator order with QID Frequency and an Albuterol order with Frequency of every 4 hours PRN wheezing or increased work of breathing using Per Protocol order mode. Greater than 13 - enter or revise RT Bronchodilator order(s) to one equivalent RT bronchodilator order with every 4 hours Frequency and an Albuterol order with Frequency of every 2 hours PRN wheezing or increased work of breathing using Per Protocol order mode.        Electronically signed by Adela Ryan RCP on 12/5/2022 at 11:05 PM

## 2022-12-06 NOTE — PLAN OF CARE
Problem: Discharge Planning  Goal: Discharge to home or other facility with appropriate resources  Outcome: Progressing     Problem: Safety - Adult  Goal: Free from fall injury  Outcome: Progressing     Problem: Skin/Tissue Integrity  Goal: Absence of new skin breakdown  Description: 1. Monitor for areas of redness and/or skin breakdown  2. Assess vascular access sites hourly  3. Every 4-6 hours minimum:  Change oxygen saturation probe site  4. Every 4-6 hours:  If on nasal continuous positive airway pressure, respiratory therapy assess nares and determine need for appliance change or resting period.   Outcome: Progressing     Problem: Nutrition Deficit:  Goal: Optimize nutritional status  Outcome: Progressing

## 2022-12-06 NOTE — FLOWSHEET NOTE
12/05/22 2126   Vital Signs   Temp 97.7 °F (36.5 °C)   Temp Source Axillary   Heart Rate 68   Heart Rate Source Monitor   Resp 16   BP (!) 146/57   MAP (Calculated) 87   BP Location Left lower arm   BP Method Automatic   Patient Position Semi fowlers   Level of Consciousness 0   MEWS Score 1   Opioid-Induced Sedation   POSS Score 1   RASS   Lopez Agitation Sedation Scale (RASS) 0   Oxygen Therapy   SpO2 98 %   Pulse Oximetry Type Intermittent   Pulse Oximeter Device Mode Intermittent   Pulse Oximeter Device Location Left;Finger   O2 Device PAP (positive airway pressure)     Patient A+Ox4, vitals and assessments done at this time. Bed in lowest position, call light within reach.

## 2022-12-06 NOTE — PROGRESS NOTES
Progress Note    Admit Date:  12/3/2022    [de-identified] yo female with PMHx morbid obesity, asthma, BETSY on BiPAP nightly, HTN, atrial fibrillation, parkinson's disease, s/p G-tube placement  Presented to ED on 12/3 with SOB and hyperkalemia, noted on routine lab work ordered by PCP  Admitted to medical for management of hyperkalemia and FARHEEN and SOB      Subjective:  Ms. Eitan Calvillo seen up in bed feels ok today , pending MBS today   No fevers, uses o2 at home prn and with bipap  Now on 1.5 L o2    Ongoing TF     Objective:   Patient Vitals for the past 4 hrs:   BP Temp Temp src Pulse Resp SpO2   12/06/22 0845 139/64 99 °F (37.2 °C) Axillary 71 20 97 %   12/06/22 0741 -- -- -- 84 16 95 %            Intake/Output Summary (Last 24 hours) at 12/6/2022 0855  Last data filed at 12/6/2022 6591  Gross per 24 hour   Intake 3270 ml   Output 2350 ml   Net 920 ml         Physical Exam:    General: elderly female up in bed,   Healed trach scar    Awake, alert and oriented. Appears to be not in any distress  Mucous Membranes:  Pink , anicteric  Left opaque eye  Neck: No JVD, no carotid bruit, no thyromegaly  Chest:  Clear to auscultation bilaterally, no added sounds  Cardiovascular:  RRR S1S2 heard, no murmurs or gallops  Abdomen:  Soft, obese, PEG noted undistended, non tender, no organomegaly, BS present  Extremities: No edema or cyanosis.  Distal pulses well felt  Neurological : grossly normal with gen weakness      Scheduled Meds:   albuterol  2.5 mg Nebulization TID    insulin lispro  0-4 Units SubCUTAneous Q4H    acetylcysteine  600 mg Inhalation BID    amLODIPine  5 mg Per G Tube Daily    apixaban  2.5 mg Per G Tube BID    atorvastatin  40 mg Per G Tube Daily    budesonide  250 mcg Nebulization BID    carbidopa-levodopa  1 tablet Per G Tube 4x Daily    carvedilol  12.5 mg Per G Tube BID WC    cetirizine  10 mg Per G Tube Daily    cloNIDine  0.1 mg Per G Tube BID    escitalopram  10 mg Per G Tube Daily    ferrous sulfate  325 mg Per G Tube Daily with breakfast    olodaterol  2 puff Inhalation Daily    lamoTRIgine  50 mg Per G Tube BID    montelukast  10 mg Per G Tube Daily    propafenone  150 mg Per G Tube 3 times per day    sodium chloride flush  5-40 mL IntraVENous 2 times per day    methylPREDNISolone  40 mg IntraVENous Daily    doxycycline (VIBRAMYCIN) IV  100 mg IntraVENous Q12H       Continuous Infusions:   dextrose      dextrose      sodium chloride         PRN Meds:  glucose, dextrose bolus **OR** dextrose bolus, glucagon (rDNA), dextrose, glucose, dextrose bolus **OR** dextrose bolus, glucagon (rDNA), dextrose, sodium chloride flush, sodium chloride, ondansetron **OR** ondansetron, polyethylene glycol, acetaminophen **OR** acetaminophen      Data:  CBC:   Recent Labs     12/04/22  0537 12/05/22  0625 12/06/22  0602   WBC 10.4 14.6* 11.3*   HGB 7.3* 7.6* 7.0*   HCT 23.1* 23.9* 21.5*   MCV 90.5 90.2 87.8    221 218       BMP:   Recent Labs     12/04/22  1144 12/05/22  0625 12/05/22  0744 12/06/22  0602    134*  --  132*   K 6.3*  --  4.7 4.8    99  --  95*   CO2 27 24  --  32   PHOS  --  4.7  --   --    BUN 64* 65*  --  67*   CREATININE 2.0* 1.7*  --  1.6*       LIVER PROFILE:   Recent Labs     12/03/22  0948 12/03/22  1036 12/06/22  0602   AST  --  9* 10*   ALT  --  <5* 7*   BILIDIR  --   --  <0.2   BILITOT 0.3  --  <0.2   ALKPHOS 106  --  88       Lab Results   Component Value Date    INR 1.61 (H) 06/19/2022    INR 1.09 04/09/2021    INR 1.27 (H) 12/10/2018    PROTIME 19.0 (H) 06/19/2022    PROTIME 12.7 04/09/2021    PROTIME 14.4 (H) 12/10/2018       CULTURES  Covid/Flu negative   Respiratory panel pending      RADIOLOGY  XR CHEST PORTABLE   Final Result   Bronchial wall thickening, perhaps mildly increased, bronchitis or reactive   airway disease. No consolidation is identified.                Assessment/Plan:    #Acute kidney injury on CKD stage IIIb  #Hyperkalemia  -creatinine of 2.3 on admission --> 1.6 today; baseline 1.3-1.7    -potassium of 7.1 on admission --> 4.8 today after bicarb fluids and lokelma  -likely 2/2 ARB and dehydration  -hold Lasix and Diovan  -nephrology consulted with recs to continue IVF with HCO3  -can resume lasix today if ok by nephrology due to developing edema           #Acute asthma exacerbation  -tachypneic on admission with reported SOB and hypoxia  -covid/flu negative  -respiratory panel viral neg   -CXR WNL  -placed on Solu-Medrol and doxycycline  -inhaled bronchodilators  -BiPAP nightly but no daytime O2 supplementation at baseline  -maintaining O2 saturations >95% on 1.5 L O2 via NC, wean as tolerated       #Iron deficiency anemia  -Hgb 7.6 >7 with IVF  monitor  -trend H/H- ad transfuse if below 7    #Obstructive sleep apnea  -on BiPAP at night  -use as needed during the day     #Paroxysmal atrial fibrillation  -continue Rythmol and Eliquis- rate controlled      #Hypertension  -continue Norvasc Coreg and clonidine  -hold Diovan and Lasix    #Carotid artery stenosis   -s/p CEA      #History of seizures  -continue Lamictal     #Morbid obesity  Weight loss helps      Recent prolonged resp failure needing tracheostomy and PEG placement  Trach removed and de cannulated, ongoing SLP eval , currently on TF    DVT Prophylaxis: eliquis  Diet: Diet NPO  ADULT TUBE FEEDING; PEG; Diabetic; Continuous; 55; No; 30; Q 4 hours  Code Status: Full Code    Dc planning after slp      Dulce Ye MD,12/6/2022 8:55 AM

## 2022-12-06 NOTE — FLOWSHEET NOTE
12/06/22 0845   Vital Signs   Temp 99 °F (37.2 °C)   Temp Source Axillary   Heart Rate 71   Heart Rate Source Monitor   Resp 20   /64   MAP (Calculated) 89   BP Location Right upper arm   BP Method Automatic   Patient Position Semi fowlers   Level of Consciousness 0   MEWS Score 1   Oxygen Therapy   SpO2 97 %   O2 Device Nasal cannula   O2 Flow Rate (L/min) 1.5 L/min   Patient resting in bed at this time, alert and oriented x 4. Assessment completed and VS obtained. Incontinence care completed. All medications given via peg tube and pt tolerated well. Tube placement checked via auscultation. No s/s of distress noted. Call light within reach.

## 2022-12-06 NOTE — PROGRESS NOTES
12/05/22 2026   NIV Type   Skin Protection for O2 Device Yes   Location Nose   NIV Started/Stopped On   Equipment Type V60   Mode Bilevel   Mask Type Full face mask   Mask Size Medium   Settings/Measurements   PIP Observed 22 cm H20   IPAP 22 cmH20   CPAP/EPAP 15 cmH2O   Vt (Measured) 511 mL   Rate Ordered 14   Resp 16   Insp Rise Time (%) 2 %   FiO2  30 %   I Time/ I Time % 1 s   Minute Volume (L/min) 13 Liters   Mask Leak (lpm) 0 lpm   Comfort Level Good   Using Accessory Muscles No   SpO2 99   Patient's Home Machine No

## 2022-12-06 NOTE — PROGRESS NOTES
Inpatient Occupational Therapy  Evaluation and Treatment    Unit: [facility]  Date:  12/6/2022  Patient Name:    Zuleyma Leon  Admitting diagnosis:  Dehydration [E86.0]  Bronchitis [J40]  FARHEEN (acute kidney injury) ([facility] [address] [N17.9]  Acute kidney injury ([facility] [address] [N17.9]  Acute respiratory failure with hypoxia ([facility] [address] [J96.01]  Admit Date:  12/3/2022  Precautions/Restrictions/WB Status/ Lines/ Wounds/ Oxygen: fall risk, IV, bed/chair alarm, purewick catheter, supplemental O2 (1L), and continuous pulse ox, PEG tube    Treatment Time:  2629-1821  Treatment Number: 1     Billable Treatment Time: 27 minutes   Total Treatment Time:   37   minutes    Patient Goals for Therapy:  \" go home \"      Discharge Recommendations: Home with 24/7 assist and home therapy  DME needs for discharge:  consider [address] for home environment (information provided to patient/spouse)       Therapy recommendations for staff:   Assist of 2 with use of MAXI-Move for all transfers to/from chair    History of Present Illness: per Dr Zain Swan H&P 12/4/22: \"An 72-year-old female with a history of morbid obesity, asthma, obstructive sleep apnea on BiPAP at night, hypertension, Parkinson's disease, status post G-tube placement, presented to the emergency room with shortness of breath and hyperkalemia  Patient was having difficulty breathing for the past day or so on the  was thinking about bringing the patient to the emergency room. At the same time the primary care physician's office called to tell them that her potassium was very high. ER labs showed a potassium of 7.1. Treated medically. Continued on BiPAP given respiratory difficulty. Patient denies having any cough. No fever or chills. No chest pain. Denies nausea vomiting or diarrhea. \"    Home Health S4 Level Recommendation:  Level 2 Social  AM-PAC Score: AM-PAC Inpatient Daily Activity Raw Score: 12    Preadmission Environment  per OT fabián 6/28/22:  Pt. Lives with spouse, assist available 24 hours/day  Home environment:    one story home  Steps to enter first floor: ramp  Steps to second floor: N/A  Bathroom: walk in shower, shower seat , grab bars, raised commode   Equipment owned: RW, wc (manual), BSC and Bipap, nebulizer, hospital bed, O2 concentrator 2L nocturnal via BIPAP, no oxygen during day, electric W/C, slide board, tube feeding supplies     Preadmission Status:  Pt. Able to drive: No,  drives  Pt Fully independent with ADLs: No  assists and bath aide assists 1x/week  Pt. Required assistance from family for: Cleaning, Cooking and Laundry   Sleeps in hospital bed, stays in bed most of the time  Home care therapy/nurse/aide, uses slide board with spouse for transfers and stands to RW with therapy    Pain  No  Rating:NA  Location:  Pain Medicine Status: No request made      Cognition    A&O x4   Able to follow 1 step commands    Subjective  Patient lying supine in bed with no family present   Pt agreeable to this OT eval & tx. Upper Extremity ROM:    WFL except B shoulder flexion limited to about 100 degrees    Upper Extremity Strength:    Strength Assessment (measured on a 0-5 scale):  3/5 overall bilaterally    Upper Extremity Sensation    WFL    Upper Extremity Proprioception:  WFL    Coordination and Tone  WFL    Balance  Functional Sitting Balance:  Impaired CGA at EOB  Functional Standing Balance:Impaired MOD A partial stance in STEDY (brief)    Bed mobility:    Supine to sit:   Mod A of 2  Sit to supine:   Not Tested  Rolling:    Not Tested  Scooting in sitting: Mod A  Scooting to head of bed:   Not Tested    Bridging:   Not Tested    Transfers:    Sit to stand:   Max A of 2 to STEDY on second attempt, unable to fully stand on first attempt  Stand to sit:  Min A of 2  Bed to chair:   Total A via Stedy  Standard toilet: Not Tested  Bed to [facility]:  Not Tested    Dressing:      UE:   Not Tested  LE:    Max A    Bathing:    UE:  Not Tested  LE:  Not Tested    Eating:   Not Tested-tube feeding    Toileting:  Not Tested    Grooming: Not Tested    Activity Tolerance   Pt completed therapy session with No adverse symptoms noted w/activity, fatigued easily  SpO2 93% on 1L  HR 60    Positioning Needs:   Up in chair, call light and needs in reach. Alarm Set    Exercise / Activities Initiated:   Hand flex/ext:  x5  Finger thumb opposition: x2 reps    Patient/Family Education:   Role of OT  Recommendations for DC  HEP  Information provided re: stand assist lift to consider for use with home therapy    Assessment of Deficits: Pt seen for Occupational therapy evaluation in acute care setting. Pt demonstrated decreased Activity tolerance, ADLs, IADLs, Balance , Bed mobility, Strength, and Transfers. Pt functioning below baseline and will likely benefit from skilled occupational therapy services to maximize safety and independence. Goal(s) : To be met in 3 Visits:  1). Bed to toilet/BSC: Mod A of 2    To be met in 5 Visits:  1). Supine to/from Sit:  Min A of 2  2). Upper Body Bathing: Mod A  3). Lower Body Bathing: Mod A  4). Upper Body Dressing:  Min A  5). Lower Body Dressing: Mod A  6). Pt to demonstrate UE exs x 15 reps with minimal cues    Rehabilitation Potential:  Good for goals listed above. Strengths for achieving goals include: Pt motivated, Family Support, and Pt cooperative  Barriers to achieving goals include:  Complexity of condition and Weakness     Plan: To be seen 3-5 x/wk while in acute care setting for therapeutic exercises, bed mobility, transfers, dressing, bathing, family/patient education, ADL/IADL retraining, energy conservation training.      Chela Almaguer, OTR/L [id number]            If patient discharges from this facility prior to next visit, this note will serve as the Discharge Summary

## 2022-12-06 NOTE — PROGRESS NOTES
Speech Language Pathology  MBSS    SLP acknowledged order for MBSS. Radiology unable to accommodate study this date. SLP notified RN and MD.  Will re-attempt MBSS tomorrow AM (or study may be completed as an outpatient if pt is to discharge this date).      Elda Liz M.S. [facility]  Speech-language pathologist  [phone]

## 2022-12-06 NOTE — PROGRESS NOTES
12/05/22 5322   NIV Type   NIV Started/Stopped On   Equipment Type V60   Mode Bilevel   Mask Type Full face mask   Mask Size Large   Settings/Measurements   PIP Observed 23 cm H20   IPAP 22 cmH20   CPAP/EPAP 15 cmH2O   Vt (Measured) 397 mL   Rate Ordered 14   Resp 20   Insp Rise Time (%) 2 %   FiO2  30 %   I Time/ I Time % 1 s   Minute Volume (L/min) 9.5 Liters   Mask Leak (lpm) 21 lpm   Comfort Level Good   Using Accessory Muscles No   SpO2 96   Patient's Home Machine No   Oxygen Therapy/Pulse Ox   O2 Therapy Oxygen   O2 Device PAP (positive airway pressure)   Heart Rate 66   SpO2 96 %

## 2022-12-06 NOTE — PROGRESS NOTES
[facility] Progress Note           Subjective/   [de-identified]y.o. year old female who we are seeing in consultation for FARHEEN/CKD-3, Hyperkalemia. HPI:  Renal function back at baseline, K normalized. Intake reduced, on TF's. ROS:  +weak, no fevers. Objective/   GEN:  Chronically ill, /64   Pulse 71   Temp 99 °F (37.2 °C) (Axillary)   Resp 20   Ht 5' 6\" (1.676 m)   Wt 261 lb 3.2 oz (118.5 kg)   SpO2 97%   BMI 42.16 kg/m²   HEENT: non-icteric, no JVD  CV: S1, S2 without m/r/g; + LE edema  RESP: CTA B without w/r/r; breathing wnl  ABD: +bs, soft, nt, no hsm  SKIN: warm, no rashes    Data/  Recent Labs     12/04/22  0537 12/05/22  0625 12/06/22  0602   WBC 10.4 14.6* 11.3*   HGB 7.3* 7.6* 7.0*   HCT 23.1* 23.9* 21.5*   MCV 90.5 90.2 87.8    221 218       Recent Labs     12/03/22  1036 12/04/22  0537 12/04/22  1144 12/05/22  0625 12/05/22  0744 12/06/22  0602   NA  --    < > 139 134*  --  132*   K 3.5   < > 6.3*  --  4.7 4.8   CL  --    < > 104 99  --  95*   CO2  --    < > 27 24  --  32   GLUCOSE  --    < > 110* 161*  --  154*   PHOS  --   --   --  4.7  --   --    MG 1.70*  --  3.10* 2.70*  --   --    BUN  --    < > 64* 65*  --  67*   CREATININE  --    < > 2.0* 1.7*  --  1.6*   LABGLOM  --    < > 25* 30*  --  32*    < > = values in this interval not displayed. Assessment/     Hyperkalemia:  On diovan and lasix at home. Worsening renal function which appears to be related to volume depletion  K was 7.1, doubt that the 3.5 was accurate. Now normalized with medical treatment. She is making urine   Etiology:  ARB and FARHEEN  Acute Kidney Injury:  KDIGO stage 1  Etiology:  Volume depletion and ARB  Chronic Kidney Disease:  Stage 3b  Baseline mostly 1.3 - 1.7.   Multiple episodes of FARHEEN  Shortness of breath:   History of asthma and BETSY  Acute exacerbation  CXR with no airspace disease  COVID negative / Influenza negative   Anemia contributing  Anemia:  Acute drop, prn prbc's. Hypermagnesemia:  Typically related to volume contraction or FARHEEN     Plan/     - Resume home dose of lasix 40 mg a day today  - Holding ARB  - Trend labs, bp's, & urine output    ____________________________________  Gabriel Neil MD  [facility]  [email or web address]  Office: [phone]

## 2022-12-06 NOTE — PROGRESS NOTES
RT Nebulizer Bronchodilator Protocol Note    There is a bronchodilator order in the chart from a provider indicating to follow the RT Bronchodilator Protocol and there is an Initiate RT Bronchodilator Protocol order as well (see protocol at bottom of note). CXR Findings:  No results found. The findings from the last RT Protocol Assessment were as follows:  Smoking: Chronic pulmonary disease  Respiratory Pattern: Dyspnea on exertion or RR 21-25 bpm  Breath Sounds: Slightly diminished and/or crackles  Cough: Strong, spontaneous, non-productive  Indication for Bronchodilator Therapy: Decreased or absent breath sounds  Bronchodilator Assessment Score: 6    Aerosolized bronchodilator medication orders have been revised according to the RT Nebulizer Bronchodilator Protocol below. Respiratory Therapist to perform RT Therapy Protocol Assessment initially then follow the protocol. Repeat RT Therapy Protocol Assessment PRN for score 0-3 or on second treatment, BID, and PRN for scores above 3. No Indications - adjust the frequency to every 6 hours PRN wheezing or bronchospasm, if no treatments needed after 48 hours then discontinue using Per Protocol order mode. If indication present, adjust the RT bronchodilator orders based on the Bronchodilator Assessment Score as indicated below. If a patient is on this medication at home then do not decrease Frequency below that used at home. 0-3 - enter or revise RT bronchodilator order(s) to equivalent RT Bronchodilator order with Frequency of every 4 hours PRN for wheezing or increased work of breathing using Per Protocol order mode. 4-6 - enter or revise RT Bronchodilator order(s) to two equivalent RT bronchodilator orders with one order with BID Frequency and one order with Frequency of every 4 hours PRN wheezing or increased work of breathing using Per Protocol order mode.          7-10 - enter or revise RT Bronchodilator order(s) to two equivalent RT bronchodilator orders with one order with TID Frequency and one order with Frequency of every 4 hours PRN wheezing or increased work of breathing using Per Protocol order mode. 11-13 - enter or revise RT Bronchodilator order(s) to one equivalent RT bronchodilator order with QID Frequency and an Albuterol order with Frequency of every 4 hours PRN wheezing or increased work of breathing using Per Protocol order mode. Greater than 13 - enter or revise RT Bronchodilator order(s) to one equivalent RT bronchodilator order with every 4 hours Frequency and an Albuterol order with Frequency of every 2 hours PRN wheezing or increased work of breathing using Per Protocol order mode. RT to enter RT Home Evaluation for COPD & MDI Assessment order using Per Protocol order mode.     Electronically signed by Nyla Louis RCP on 12/6/2022 at 7:44 AM

## 2022-12-06 NOTE — PROGRESS NOTES
Inpatient Physical Therapy Evaluation and Treatment    Unit: [facility]  Date:  12/6/2022  Patient Name:    Ruth Pearl  Admitting diagnosis:  Dehydration [E86.0]  Bronchitis [J40]  FARHEEN (acute kidney injury) ([facility] [N17.9]  Acute kidney injury ([facility] [N17.9]  Acute respiratory failure with hypoxia ([facility] [J96.01]  Admit Date:  12/3/2022  Precautions/Restrictions/WB Status/ Lines/ Wounds/ Oxygen: Fall risk, Bed/chair alarm, Lines -IV, Supplemental O2 (1.5L), Purewick catheter, and PEG tube, Telemetry, and Continuous pulse oximetry, blind in L eye    Treatment Time:  10:05 - 10:42  Treatment Number:  1   Timed Code Treatment Minutes: 27 minutes  Total Treatment Minutes:  37  minutes    Patient Goals for Therapy: \" go home \"          Discharge Recommendations: Home 24 hr assist and with home PT   DME needs for discharge:  would benefit from STEDY at home       Therapy recommendation for EMS Transport: requires transport by cot due to requiring lift equipment for transfers    Therapy recommendations for staff:   Assist of 2 with use of MAXI-Move for all transfers to/from chair    History of Present Illness:   Per Dr. Glenis Lesches:  [de-identified] yo female with PMHx morbid obesity, asthma, BETSY on BiPAP nightly, HTN, atrial fibrillation, parkinson's disease, s/p G-tube placement  Presented to ED on 12/3 with SOB and hyperkalemia, noted on routine lab work ordered by PCP  Admitted to medical for management of hyperkalemia and FARHEEN and SOB     Subjective:  Ms. Eitan Calvillo reports feeling that overall her breathing has improved, although she did have increased shortness of breath when working with PT/OT. Otherwise, she denies fever, chills, n/v/d, abdominal pain or urinary symptoms. Home Health S4 Level Recommendation:  Level 1 Standard  AM-PAC Mobility Score       AM-PAC Inpatient Mobility without Stair Climbing Raw Score : 8    Preadmission Environment    Pt.  Lives with spouse, assist available 24 hours/day  Home environment:    one story therapy session with No adverse symptoms noted w/activity    Supine at rest:  SpO2: 94% on 1L         HR: 60  Unable to obtain BP    Positioning Needs   Pt up in chair, alarm set, positioned in proper neutral alignment and pressure relief provided. Call light provided and all needs within reach  Lift pad underneath pt and PCA aware of pt status. Pt's spouse in room with pt. Exercises Initiated  Veena deferred secondary to treatment focus on functional mobility  NA    Other  None. Patient/Family Education   Pt educated on role of inpatient PT, POC, importance of continued activity, DC recommendations, safety awareness, transfer techniques, pacing activity, and calling for assist with mobility. Assessment  Pt seen for Physical Therapy evaluation in acute care setting. Pt demonstrated decreased Activity tolerance, Balance, ROM, Safety, and Strength as well as decreased independence with Ambulation, Bed Mobility , and Transfers. Recommending Home 24 hr assist and with home PT upon discharge as patient functioning close to baseline level and would benefit from continued therapy services    Goals : To be met in 3 visits:  1). Independent with LE Ex x 10 reps    To be met in 6 visits:  1). Supine to/from sit: Min A   2). Sit to/from stand: Min A   3). Bed to chair: Min A   4). Tolerate B LE exercises 3 sets of 10-15 reps  6). Static sitting EOB ~ 5 min with Good balance and supervision    Rehabilitation Potential: Good  Strengths for achieving goals include:   Pt motivated, Family Support, and Pt cooperative   Barriers to achieving goals include:    Weakness    Plan    To be seen 3-5 x / week  while in acute care setting for therapeutic exercises, bed mobility, transfers, progressive gait training, balance training, and family/patient education.     Signature: María Elena Whitney, PT, DPT, OMT-C  #[id number]      If patient discharges from this facility prior to next visit, this note will serve as the

## 2022-12-06 NOTE — PLAN OF CARE
Problem: Discharge Planning  Goal: Discharge to home or other facility with appropriate resources  Outcome: Progressing     Problem: Safety - Adult  Goal: Free from fall injury  Outcome: Progressing     Problem: Skin/Tissue Integrity  Goal: Absence of new skin breakdown  Description: 1. Monitor for areas of redness and/or skin breakdown  2. Assess vascular access sites hourly  3. Every 4-6 hours minimum:  Change oxygen saturation probe site  4. Every 4-6 hours:  If on nasal continuous positive airway pressure, respiratory therapy assess nares and determine need for appliance change or resting period.   Outcome: Progressing     Problem: Nutrition Deficit:  Goal: Optimize nutritional status  12/6/2022 0202 by Tone Quezada RN  Outcome: Progressing  12/5/2022 1844 by Faye العلي RN  Outcome: Progressing     Problem: Chronic Conditions and Co-morbidities  Goal: Patient's chronic conditions and co-morbidity symptoms are monitored and maintained or improved  Outcome: Progressing

## 2022-12-07 ENCOUNTER — APPOINTMENT (OUTPATIENT)
Dept: GENERAL RADIOLOGY | Age: 82
DRG: 682 | End: 2022-12-07
Payer: MEDICARE

## 2022-12-07 VITALS
SYSTOLIC BLOOD PRESSURE: 153 MMHG | WEIGHT: 261.2 LBS | HEART RATE: 57 BPM | TEMPERATURE: 97.6 F | HEIGHT: 66 IN | BODY MASS INDEX: 41.98 KG/M2 | DIASTOLIC BLOOD PRESSURE: 68 MMHG | OXYGEN SATURATION: 97 % | RESPIRATION RATE: 18 BRPM

## 2022-12-07 LAB
ALBUMIN SERPL-MCNC: 3.1 G/DL (ref 3.4–5)
ALP BLD-CCNC: 87 U/L (ref 40–129)
ALT SERPL-CCNC: 8 U/L (ref 10–40)
ANION GAP SERPL CALCULATED.3IONS-SCNC: 8 MMOL/L (ref 3–16)
AST SERPL-CCNC: 12 U/L (ref 15–37)
BASOPHILS ABSOLUTE: 0 K/UL (ref 0–0.2)
BASOPHILS RELATIVE PERCENT: 0 %
BILIRUB SERPL-MCNC: <0.2 MG/DL (ref 0–1)
BILIRUBIN DIRECT: <0.2 MG/DL (ref 0–0.3)
BILIRUBIN, INDIRECT: ABNORMAL MG/DL (ref 0–1)
BUN BLDV-MCNC: 71 MG/DL (ref 7–20)
CALCIUM SERPL-MCNC: 7.8 MG/DL (ref 8.3–10.6)
CHLORIDE BLD-SCNC: 98 MMOL/L (ref 99–110)
CO2: 30 MMOL/L (ref 21–32)
CREAT SERPL-MCNC: 1.6 MG/DL (ref 0.6–1.2)
EOSINOPHILS ABSOLUTE: 0 K/UL (ref 0–0.6)
EOSINOPHILS RELATIVE PERCENT: 0 %
GFR SERPL CREATININE-BSD FRML MDRD: 32 ML/MIN/{1.73_M2}
GLUCOSE BLD-MCNC: 134 MG/DL (ref 70–99)
GLUCOSE BLD-MCNC: 142 MG/DL (ref 70–99)
GLUCOSE BLD-MCNC: 163 MG/DL (ref 70–99)
GLUCOSE BLD-MCNC: 193 MG/DL (ref 70–99)
GLUCOSE BLD-MCNC: 218 MG/DL (ref 70–99)
HCT VFR BLD CALC: 22.9 % (ref 36–48)
HEMOGLOBIN: 7.4 G/DL (ref 12–16)
LYMPHOCYTES ABSOLUTE: 0.5 K/UL (ref 1–5.1)
LYMPHOCYTES RELATIVE PERCENT: 4.8 %
MCH RBC QN AUTO: 29 PG (ref 26–34)
MCHC RBC AUTO-ENTMCNC: 32.4 G/DL (ref 31–36)
MCV RBC AUTO: 89.4 FL (ref 80–100)
MONOCYTES ABSOLUTE: 0.6 K/UL (ref 0–1.3)
MONOCYTES RELATIVE PERCENT: 6.2 %
NEUTROPHILS ABSOLUTE: 9 K/UL (ref 1.7–7.7)
NEUTROPHILS RELATIVE PERCENT: 89 %
PDW BLD-RTO: 12.6 % (ref 12.4–15.4)
PERFORMED ON: ABNORMAL
PLATELET # BLD: 218 K/UL (ref 135–450)
PMV BLD AUTO: 10 FL (ref 5–10.5)
POTASSIUM REFLEX MAGNESIUM: 5.3 MMOL/L (ref 3.5–5.1)
RBC # BLD: 2.56 M/UL (ref 4–5.2)
SODIUM BLD-SCNC: 136 MMOL/L (ref 136–145)
TOTAL PROTEIN: 5.8 G/DL (ref 6.4–8.2)
WBC # BLD: 10.1 K/UL (ref 4–11)

## 2022-12-07 PROCEDURE — 99239 HOSP IP/OBS DSCHRG MGMT >30: CPT | Performed by: INTERNAL MEDICINE

## 2022-12-07 PROCEDURE — 94640 AIRWAY INHALATION TREATMENT: CPT

## 2022-12-07 PROCEDURE — 2580000003 HC RX 258

## 2022-12-07 PROCEDURE — 6360000002 HC RX W HCPCS: Performed by: INTERNAL MEDICINE

## 2022-12-07 PROCEDURE — 6370000000 HC RX 637 (ALT 250 FOR IP): Performed by: INTERNAL MEDICINE

## 2022-12-07 PROCEDURE — 92611 MOTION FLUOROSCOPY/SWALLOW: CPT

## 2022-12-07 PROCEDURE — 85025 COMPLETE CBC W/AUTO DIFF WBC: CPT

## 2022-12-07 PROCEDURE — 36415 COLL VENOUS BLD VENIPUNCTURE: CPT

## 2022-12-07 PROCEDURE — 80048 BASIC METABOLIC PNL TOTAL CA: CPT

## 2022-12-07 PROCEDURE — 94761 N-INVAS EAR/PLS OXIMETRY MLT: CPT

## 2022-12-07 PROCEDURE — 92526 ORAL FUNCTION THERAPY: CPT

## 2022-12-07 PROCEDURE — 2500000003 HC RX 250 WO HCPCS

## 2022-12-07 PROCEDURE — 94660 CPAP INITIATION&MGMT: CPT

## 2022-12-07 PROCEDURE — 80076 HEPATIC FUNCTION PANEL: CPT

## 2022-12-07 PROCEDURE — 2700000000 HC OXYGEN THERAPY PER DAY

## 2022-12-07 PROCEDURE — 74230 X-RAY XM SWLNG FUNCJ C+: CPT

## 2022-12-07 PROCEDURE — 6370000000 HC RX 637 (ALT 250 FOR IP)

## 2022-12-07 RX ORDER — PREDNISONE 10 MG/1
TABLET ORAL
Qty: 18 TABLET | Refills: 0 | Status: SHIPPED | OUTPATIENT
Start: 2022-12-07

## 2022-12-07 RX ORDER — DOXYCYCLINE HYCLATE 100 MG
100 TABLET ORAL 2 TIMES DAILY
Qty: 6 TABLET | Refills: 0 | Status: SHIPPED | OUTPATIENT
Start: 2022-12-07 | End: 2022-12-10

## 2022-12-07 RX ORDER — ALBUTEROL SULFATE 2.5 MG/3ML
2.5 SOLUTION RESPIRATORY (INHALATION) 2 TIMES DAILY
Status: DISCONTINUED | OUTPATIENT
Start: 2022-12-07 | End: 2022-12-07 | Stop reason: HOSPADM

## 2022-12-07 RX ADMIN — DOXYCYCLINE 100 MG: 100 INJECTION, POWDER, LYOPHILIZED, FOR SOLUTION INTRAVENOUS at 15:20

## 2022-12-07 RX ADMIN — Medication 1 TABLET: at 15:08

## 2022-12-07 RX ADMIN — PROPAFENONE HYDROCHLORIDE 150 MG: 150 TABLET, FILM COATED ORAL at 15:08

## 2022-12-07 RX ADMIN — CETIRIZINE HYDROCHLORIDE 10 MG: 10 TABLET ORAL at 10:37

## 2022-12-07 RX ADMIN — LAMOTRIGINE 50 MG: 25 TABLET ORAL at 10:39

## 2022-12-07 RX ADMIN — APIXABAN 2.5 MG: 5 TABLET, FILM COATED ORAL at 10:38

## 2022-12-07 RX ADMIN — ATORVASTATIN CALCIUM 40 MG: 40 TABLET, FILM COATED ORAL at 10:38

## 2022-12-07 RX ADMIN — PROPAFENONE HYDROCHLORIDE 150 MG: 150 TABLET, FILM COATED ORAL at 04:37

## 2022-12-07 RX ADMIN — ACETYLCYSTEINE 600 MG: 200 INHALANT RESPIRATORY (INHALATION) at 06:58

## 2022-12-07 RX ADMIN — AMLODIPINE BESYLATE 5 MG: 5 TABLET ORAL at 10:38

## 2022-12-07 RX ADMIN — SODIUM CHLORIDE, PRESERVATIVE FREE 10 ML: 5 INJECTION INTRAVENOUS at 10:40

## 2022-12-07 RX ADMIN — ALBUTEROL SULFATE 2.5 MG: 2.5 SOLUTION RESPIRATORY (INHALATION) at 06:59

## 2022-12-07 RX ADMIN — CARVEDILOL 12.5 MG: 6.25 TABLET, FILM COATED ORAL at 10:37

## 2022-12-07 RX ADMIN — Medication 1 TABLET: at 10:38

## 2022-12-07 RX ADMIN — OLODATEROL RESPIMAT INHALATION SPRAY 2 PUFF: 2.5 SPRAY, METERED RESPIRATORY (INHALATION) at 06:59

## 2022-12-07 RX ADMIN — FERROUS SULFATE TAB 325 MG (65 MG ELEMENTAL FE) 325 MG: 325 (65 FE) TAB at 10:39

## 2022-12-07 RX ADMIN — FUROSEMIDE 40 MG: 40 TABLET ORAL at 10:38

## 2022-12-07 RX ADMIN — CLONIDINE HYDROCHLORIDE 0.1 MG: 0.1 TABLET ORAL at 10:38

## 2022-12-07 RX ADMIN — ESCITALOPRAM OXALATE 10 MG: 10 TABLET ORAL at 10:39

## 2022-12-07 RX ADMIN — DOXYCYCLINE 100 MG: 100 INJECTION, POWDER, LYOPHILIZED, FOR SOLUTION INTRAVENOUS at 04:32

## 2022-12-07 RX ADMIN — BUDESONIDE 250 MCG: 0.5 SUSPENSION RESPIRATORY (INHALATION) at 06:59

## 2022-12-07 RX ADMIN — SODIUM CHLORIDE: 9 INJECTION, SOLUTION INTRAVENOUS at 04:32

## 2022-12-07 RX ADMIN — SODIUM ZIRCONIUM CYCLOSILICATE 10 G: 10 POWDER, FOR SUSPENSION ORAL at 10:39

## 2022-12-07 NOTE — DISCHARGE INSTR - COC
Continuity of Care Form    Patient Name: Tram Nayak   :  1940  MRN:  [id number]    [facility] date:  12/3/2022  Discharge date:  2022    Code Status Order: Full Code   Advance Directives:     Admitting Physician:  Flavia Mayers MD  PCP: Fernando Low MD    Discharging Nurse: [facility] Unit/Room#: 0223/0223-02  Discharging Unit Phone Number: ***    Emergency Contact:   Extended Emergency Contact Information  Primary Emergency Contact:  AndrésWesly LEIDY  Address: [address] [place] [address]  Home Phone: [phone]  Mobile Phone: [phone]  Relation: Spouse    Past Surgical History:  Past Surgical History:   Procedure Laterality Date    APPENDECTOMY      BRONCHOSCOPY N/A 2022    BRONCHOSCOPY ALVEOLAR LAVAGE performed by Robert Fleming MD at [place] [address]  2022    BRONCHOSCOPY THERAPUTIC ASPIRATION INITIAL performed by Robert Fleming MD at [facility]      left    CATARACT REMOVAL      CERVICAL FUSION  2010    CHOLECYSTECTOMY      COLONOSCOPY N/A 3/8/2020    COLONOSCOPY FLEXIBLE W/ DECOMPRESSION performed by Montana Villagomez MD at [facility] [address]  2022    GASTROSTOMY/PEG TUBE CHANGE performed by Melissa Shen DO at [address] (CERVIX STATUS UNKNOWN)      IR MIDLINE CATH  10/8/2021    IR MIDLINE CATH 10/8/2021 [address] SPECIAL PROCEDURES    JOINT REPLACEMENT Bilateral     KNEE SURGERY      replacement x 2    OVARY REMOVAL      SHOULDER SURGERY      TONSILLECTOMY      TRACHEOSTOMY N/A 7/15/2022    TRACHEOTOMY WITH PERCUTANEOUS ENDOSCOPIC GASTROSTOMY TUBE PLACEMENT performed by Purvis Canavan, MD at [address]  2020    Esophagitis    UPPER GASTROINTESTINAL ENDOSCOPY N/A 3/17/2020    EGD performed by Emelyn Benson Mikael Leyva MD at [facility] ENDOSCOPY  11/8/2022    EGD ESOPHAGOGASTRODUODENOSCOPY performed by Juana Godfrey DO at [address]         Immunization History:   Immunization History   Administered Date(s) Administered    COVID-19, PFIZER Bivalent BOOSTER, (age 12y+), IM, 30 mcg/0.3 mL dose 10/12/2022    COVID-19, PFIZER GRAY top, DO NOT Dilute, (age 15 y+), IM, 30 mcg/0.3 mL 04/08/2022    COVID-19, PFIZER PURPLE top, DILUTE for use, (age 15 y+), 30mcg/0.3mL 02/10/2021, 02/10/2021, 03/03/2021, 03/03/2021, 11/09/2021    Hepatitis A 05/08/2019    Hepatitis A Adult (Havrix, Vaqta) 05/08/2019, 05/08/2019, 12/17/2019    Hepatitis A Adult (Vaqta) 05/08/2019    Influenza A (T2K4-30) Vaccine PF IM 01/23/2010, 01/23/2010    Influenza Vaccine, unspecified formulation 08/09/2016, 09/01/2017    Influenza Virus Vaccine 09/27/2011, 10/01/2012, 08/09/2016, 09/01/2017    Influenza Whole 09/02/2015    Influenza, FLUAD, (age 72 y+), Adjuvanted, 0.5mL 10/20/2021    Influenza, FLUMIST, (age 2-51 y), Live, Intranasal, 0.2mL 09/21/2020    Influenza, FLUZONE (age 72 y+), High Dose, 0.7mL 09/21/2020, 09/21/2020    Influenza, High Dose (Fluzone 65 yrs and older) 09/09/2015, 09/09/2015, 08/15/2016, 08/15/2016, 09/15/2017, 09/14/2018, 09/21/2020    Influenza, Triv, inactivated, subunit, adjuvanted, IM (Fluad 65 yrs and older) 09/27/2019    Pneumococcal Conjugate 13-valent (Nblhfat40) 07/28/2015    Pneumococcal Polysaccharide (Qyojlmoen53) 11/26/2007    Tdap (Boostrix, Adacel) 03/30/2021    Zoster Live (Zostavax) 05/08/2019, 12/17/2019    Zoster Recombinant (Shingrix) 05/08/2019, 05/08/2019, 12/17/2019       Active Problems:  Patient Active Problem List   Diagnosis Code    GERD (gastroesophageal reflux disease) K21.9    Essential hypertension I10    Hyperlipidemia E78.5    Restless leg syndrome G25.81    Anemia of chronic disease D63.8    Postural dizziness R42    Moderate persistent asthma J45.40    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-sided sciatica M54.41, G89.29    Morbid obesity with BMI of 40.0-44.9, adult ([facility]) E66.01, Z68.41    Type 2 diabetes mellitus with stage 4 chronic kidney disease, without long-term current use of insulin ([facility]) E11.22, N18.4    CAD (coronary artery disease) I25.10    Parkinson disease ([facility]) G20    Severe obstructive sleep apnea G47.33    Hypothyroidism E03.9    Hepatic steatosis K76.0    Diverticulosis K57.90    Blind left eye H54.40    L carotid artery stenosis s/p CEA I65.29    Septicemia ([facility]) A41.9    Paroxysmal atrial fibrillation ([facility]) I48.0    COPD (chronic obstructive pulmonary disease) ([facility]) J44.9    Acute respiratory failure with hypoxia ([facility]) M05.15    Diastolic congestive heart failure ([facility]) I50.30    Partial symptomatic epilepsy with complex partial seizures, not intractable, without status epilepticus ([facility]) G40.209    Chronic kidney disease N18.9    Acute kidney injury superimposed on CKD ([facility]) N17.9, N18.9    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    Sialadenitis K11.20    Chest pain R07.9    Pulmonary HTN ([facility]) I27.20    Mitral valve insufficiency I34.0    C. difficile colitis A04.72    Laceration of left lower extremity S81.812A    Pulmonary nodules R91.8    Sensorineural hearing loss, bilateral H90.3    Aspiration pneumonia of left lower lobe ([facility]) J69.0    Severe protein-calorie malnutrition ([facility]) E43    Chronic anemia D64.9    Thrush B37.0    MRSA colonization Z22.322    BETSY treated with BiPAP X44.45    Metabolic acidosis X92.50    Pulmonary congestion R09.89    Mucus plugging of bronchi T17.500A    Atrial fibrillation, chronic ([facility]) I48.20    Mild protein-calorie malnutrition ([facility]) E44.1    Ventilator dependent ([facility]) Z99.11    Gastrostomy tube dysfunction ([facility]) K94.23    FARHEEN (acute kidney injury) ([facility] N17.9    Hyperkalemia E87.5       Isolation/Infection:   Isolation            Contact          Patient Infection Status       Infection Onset Added Last Indicated Last Indicated By Review Planned Expiration Resolved Resolved By    MRSA 22 MRSA DNA Probe, Nasal        Resolved    COVID-19 (Rule Out) 22 Respiratory Panel, Molecular, with COVID-19 (Restricted: peds pts or suitable admitted adults) (Ordered)   22 Rule-Out Test Resulted    COVID-19 (Rule Out) 22 COVID-19 & Influenza Combo (Ordered)   22 Rule-Out Test Resulted    COVID-19 (Rule Out) 22 COVID-19 & Influenza Combo (Ordered)   22 Rule-Out Test Resulted    C-diff Rule Out 22 Clostridium difficile toxin/antigen (Ordered)   22 Mandi Esteves RN    Order     COVID-19 (Rule Out) 22 COVID-19 & Influenza Combo (Ordered)   22 Rule-Out Test Resulted    C-diff Rule Out 10/07/21 10/07/21 10/07/21 GI Bacterial Pathogens By PCR (Ordered)   10/07/21 Rule-Out Test Resulted            Nurse Assessment:  Last Vital Signs: /62   Pulse 69   Temp 97 °F (36.1 °C) (Oral)   Resp 24   Ht 5' 6\" (1.676 m)   Wt 261 lb 3.2 oz (118.5 kg)   SpO2 98%   BMI 42.16 kg/m²     Last documented pain score (0-10 scale): Pain Level: 0  Last Weight:   Wt Readings from Last 1 Encounters:   22 261 lb 3.2 oz (118.5 kg)     Mental Status:  {IP PT MENTAL STATUS:07163}    IV Access:  {[facility] IV ACCESS:075358660}    Nursing Mobility/ADLs:  Walking   {CHP DME AGRN:237848507}  Transfer  {CHP DME BQTS:244906277}  Bathing  {CHP DME VHSV:212202200}  Dressing  {CHP DME ZYXH:513110682}  Toileting  {CHP DME GLQA:573978813}  Feeding  {CHP DME IANO:304893410}  Med Admin  {CHP DME QFWR:789987523}  Med Delivery   {[facility] MED Delivery:808540538}    Wound Care Documentation and Therapy:        Elimination:  Continence:    Bowel: {YES / LL:16243}  Bladder: {YES / KK:03648}  Urinary Catheter: {Urinary Catheter:930692306}   Colostomy/Ileostomy/Ileal Conduit: {YES / KF:}       Date of Last BM: ***    Intake/Output Summary (Last 24 hours) at 2022 0845  Last data filed at 2022 2130  Gross per 24 hour   Intake --   Output 800 ml   Net -800 ml     I/O last 3 completed shifts:  In: -   Out: 08 [Urine:3050]    Safety Concerns:     [address] Safety Concerns:069299984}    Impairments/Disabilities:      [address] Impairments/Disabilities:682991079}    Nutrition Therapy:  Current Nutrition Therapy:   [address] MICA Diet List:347750735}    Routes of Feeding: {CHP DME Other Feedings:859037345}  Liquids: {Slp liquid thickness:40603}  Daily Fluid Restriction: {CHP DME Yes amt example:760607234}  Last Modified Barium Swallow with Video (Video Swallowing Test): {Done Not Done TPLT:781019768}    Treatments at the Time of Hospital Discharge:   Respiratory Treatments: ***  Oxygen Therapy:  {Therapy; copd oxygen:81191}  Ventilator:    { CC Vent HAIF:783140883}    Rehab Therapies: Physical Therapy, Occupational Therapy, and SN  Weight Bearing Status/Restrictions: [address]  Weight Bearin}  Other Medical Equipment (for information only, NOT a DME order):  {EQUIPMENT:247131347}  Other Treatments: ***    Patient's personal belongings (please select all that are sent with patient):  {CHP DME Belongings:315391475}    RN SIGNATURE:  {Esignature:150528397}    CASE MANAGEMENT/SOCIAL WORK SECTION    Inpatient Status Date: 12/3/2022    Readmission Risk Assessment Score:  Readmission Risk              Risk of Unplanned Readmission:  48           Discharging to Facility/ Agency   Name: [facility]  Address:  Phone: [phone]  Fax:        / signature: Electronically signed by Erma Campbell RN on 22 at 9:33 AM EST    PHYSICIAN SECTION    Prognosis: {Prognosis:4093219503}    Condition at Discharge: [address] Patient Condition:865876814}    Rehab Potential (if transferring to Rehab): {Prognosis:9055520306}    Recommended Labs or Other Treatments After Discharge: Renal function panel, CBC in 1 week, Follow-up with Dr. Judit Teixeira in 2 weeks      Physician Certification: I certify the above information and transfer of Amanda Early  is necessary for the continuing treatment of the diagnosis listed and that she requires Home Care for less 30 days.      Update Admission H&P: {CHP DME Changes in Replaced by [facility]}    PHYSICIAN SIGNATURE:  Electronically signed by Mauri March MD/Sarika Peterson, RN on 22 at 9:33 AM EST

## 2022-12-07 NOTE — PROGRESS NOTES
[facility] Progress Note           Subjective/   [de-identified]y.o. year old female who we are seeing in consultation for FARHEEN/CKD-3, Hyperkalemia. HPI:  Renal function back at baseline, K normalized, non-oliguric with resuming of lasix. Intake reduced, on TF's. ROS:  +weak, states shortness of breath better. Objective/   GEN:  Chronically ill, /62   Pulse 69   Temp 97 °F (36.1 °C) (Oral)   Resp 24   Ht 5' 6\" (1.676 m)   Wt 261 lb 3.2 oz (118.5 kg)   SpO2 98%   BMI 42.16 kg/m²   HEENT: non-icteric, no JVD  CV: S1, S2 without m/r/g; + LE edema  RESP: CTA B without w/r/r; breathing wnl  ABD: +bs, soft, nt, no hsm  SKIN: warm, no rashes    Data/  Recent Labs     12/05/22  0625 12/06/22  0602 12/07/22  0600   WBC 14.6* 11.3* 10.1   HGB 7.6* 7.0* 7.4*   HCT 23.9* 21.5* 22.9*   MCV 90.2 87.8 89.4    218 218       Recent Labs     12/04/22  1144 12/05/22  0625 12/05/22  0744 12/06/22  0602 12/07/22  0600    134*  --  132* 136   K 6.3*  --  4.7 4.8 5.3*    99  --  95* 98*   CO2 27 24  --  32 30   GLUCOSE 110* 161*  --  154* 163*   PHOS  --  4.7  --   --   --    MG 3.10* 2.70*  --   --   --    BUN 64* 65*  --  67* 71*   CREATININE 2.0* 1.7*  --  1.6* 1.6*   LABGLOM 25* 30*  --  32* 32*         Assessment/     Hyperkalemia:  On diovan and lasix at home. Worsening renal function which appears to be related to volume depletion  K was 7.1, doubt that the 3.5 was accurate. Now normalized with medical treatment. She is making urine   Etiology:  ARB and FARHEEN with catabolic effect from IV steroids c/b hyperglycemia with translocation effect  Acute Kidney Injury:  KDIGO stage 1  Etiology:  Volume depletion and ARB  Chronic Kidney Disease:  Stage 3b  Baseline mostly 1.3 - 1.7. Multiple episodes of FARHEEN. Follows with [facility] with Dr. Rama Orona.   Shortness of breath:   History of asthma and BETSY  Acute exacerbation  CXR with no airspace disease  COVID negative / Influenza negative Anemia contributing  Anemia:  Acute drop, prn prbc's. Hypermagnesemia:  Typically related to volume contraction or FARHEEN     Plan/     - Resumed home dose of lasix 40 mg a day since 12/6  - Prn dose of lokelma today  - Holding ARB with high K issues  - Trend labs, bp's, & urine output    Okay for discharge  Recheck labs in 1 week on MICA, f/u with Dr. Maxine Membreno    ____________________________________  Yojana Fairchild MD  [facility]  [email or web address]  Office: [phone]

## 2022-12-07 NOTE — CARE COORDINATION
INTERDISCIPLINARY PLAN OF CARE CONFERENCE    Date/Time: 12/7/2022 2:21 PM  Completed by: Kiarra Gomes RN, Case Management      Patient Name:  Keyur Ruiz  YOB: 1940  Admitting Diagnosis: Dehydration [E86.0]  Bronchitis [J40]  FARHEEN (acute kidney injury) ([facility] [N17.9]  Acute kidney injury ([facility] [N17.9]  Acute respiratory failure with hypoxia ([facility] [J96.01]     Admit Date/Time:  12/3/2022  8:56 AM    Chart reviewed. Interdisciplinary team contacted or reviewed plan related to patient progress and discharge plans. Disciplines included Case Management, Nursing, and Dietitian. Current Status: Inpatient  PT/OT recommendation for discharge plan of care: Home 24 hr assist and Home PT; Would benefit from STEDY at home    Expected D/C Disposition:  Home  Confirmed plan with patient and/or family Yes confirmed with:Patient and spouse at bedside. Discharge Plan Comments: Chart reviewed. Met with patient and spouse at bedside to discuss therapy recommendations and discharge plan. Patient and spouse confirm plan to return home with resumption of [facility] services via [facility]. Spouse provides 24/7 assist. Spoke to Ayla at [facility] who states following patient in Epic for resumption of services at discharge. Arin Huang at [facility] contacted for [facility]. States they do not carry product and suggested writer contact [address] at [facility] [address] to [address] who states they carry product; product not covered by insurance; can rent to purchase for $250 monthly x 12 months. Discussed recommendations for STEDY with patient and spouse. Spouse states he will purchase online after discharge. Patient active with Sonia Dent for tube feeds.     Home O2 in place on admit: Yes  Pt informed of need to bring portable home O2 tank on day of discharge for nursing to connect prior to leaving:  Not Indicated  Verbalized agreement/Understanding:  Not Indicated

## 2022-12-07 NOTE — PROGRESS NOTES
12/07/22 0253   NIV Type   NIV Started/Stopped On   Equipment Type v60   Mode Bilevel   Mask Type Full face mask   Mask Size Medium   Settings/Measurements   IPAP 22 cmH20   CPAP/EPAP 15 cmH2O   Vt (Measured) 619 mL   Rate Ordered 14   Resp 23   FiO2  30 %   I Time/ I Time % 1 s   Minute Volume (L/min) 14 Liters   Mask Leak (lpm) 0 lpm   Comfort Level Good   Using Accessory Muscles No   SpO2 97   Patient's Home Machine No

## 2022-12-07 NOTE — PROGRESS NOTES
Progress Note    Admit Date:  12/3/2022    [de-identified] yo female with PMHx morbid obesity, asthma, BETSY on BiPAP nightly, HTN, atrial fibrillation, parkinson's disease, s/p G-tube placement  Presented to ED on 12/3 with SOB and hyperkalemia, noted on routine lab work ordered by PCP  Admitted to medical for management of hyperkalemia and FARHEEN and SOB      Subjective:  Ms. Souleymane Meyer seen up in bed feels ok today , pending MBS today   No fevers, uses o2 at home prn and with bipap  Now on 1.5 L o2    Ongoing TF     Objective:   Patient Vitals for the past 4 hrs:   BP Temp Temp src Pulse Resp SpO2   12/07/22 0703 -- -- -- 69 24 98 %   12/07/22 0530 131/62 97 °F (36.1 °C) Oral 58 20 100 %            Intake/Output Summary (Last 24 hours) at 12/7/2022 0758  Last data filed at 12/6/2022 2130  Gross per 24 hour   Intake --   Output 800 ml   Net -800 ml         Physical Exam:    General: elderly female up in bed,   Healed trach scar    Awake, alert and oriented. Appears to be not in any distress  Mucous Membranes:  Pink , anicteric  Left opaque eye  Neck: No JVD, no carotid bruit, no thyromegaly  Chest:  Clear to auscultation bilaterally, no added sounds  Cardiovascular:  RRR S1S2 heard, no murmurs or gallops  Abdomen:  Soft, obese, PEG noted undistended, non tender, no organomegaly, BS present  Extremities: No edema or cyanosis.  Distal pulses well felt  Neurological : grossly normal with gen weakness      Scheduled Meds:   furosemide  40 mg Oral Daily    albuterol  2.5 mg Nebulization TID    insulin lispro  0-4 Units SubCUTAneous Q4H    acetylcysteine  600 mg Inhalation BID    amLODIPine  5 mg Per G Tube Daily    apixaban  2.5 mg Per G Tube BID    atorvastatin  40 mg Per G Tube Daily    budesonide  250 mcg Nebulization BID    carbidopa-levodopa  1 tablet Per G Tube 4x Daily    carvedilol  12.5 mg Per G Tube BID WC    cetirizine  10 mg Per G Tube Daily    cloNIDine  0.1 mg Per G Tube BID    escitalopram  10 mg Per G Tube Daily    ferrous sulfate  325 mg Per G Tube Daily with breakfast    olodaterol  2 puff Inhalation Daily    lamoTRIgine  50 mg Per G Tube BID    montelukast  10 mg Per G Tube Daily    propafenone  150 mg Per G Tube 3 times per day    sodium chloride flush  5-40 mL IntraVENous 2 times per day    methylPREDNISolone  40 mg IntraVENous Daily    doxycycline (VIBRAMYCIN) IV  100 mg IntraVENous Q12H       Continuous Infusions:   dextrose      dextrose      sodium chloride 5 mL/hr at 12/07/22 0432       PRN Meds:  glucose, dextrose bolus **OR** dextrose bolus, glucagon (rDNA), dextrose, glucose, dextrose bolus **OR** dextrose bolus, glucagon (rDNA), dextrose, sodium chloride flush, sodium chloride, ondansetron **OR** ondansetron, polyethylene glycol, acetaminophen **OR** acetaminophen      Data:  CBC:   Recent Labs     12/05/22  0625 12/06/22  0602 12/07/22  0600   WBC 14.6* 11.3* 10.1   HGB 7.6* 7.0* 7.4*   HCT 23.9* 21.5* 22.9*   MCV 90.2 87.8 89.4    218 218       BMP:   Recent Labs     12/05/22  0625 12/05/22  0744 12/06/22  0602 12/07/22  0600   *  --  132* 136   K  --  4.7 4.8 5.3*   CL 99  --  95* 98*   CO2 24  --  32 30   PHOS 4.7  --   --   --    BUN 65*  --  67* 71*   CREATININE 1.7*  --  1.6* 1.6*       LIVER PROFILE:   Recent Labs     12/06/22  0602 12/07/22  0600   AST 10* 12*   ALT 7* 8*   BILIDIR <0.2 <0.2   BILITOT <0.2 <0.2   ALKPHOS 88 87       Lab Results   Component Value Date    INR 1.61 (H) 06/19/2022    INR 1.09 04/09/2021    INR 1.27 (H) 12/10/2018    PROTIME 19.0 (H) 06/19/2022    PROTIME 12.7 04/09/2021    PROTIME 14.4 (H) 12/10/2018       CULTURES  Covid/Flu negative   Respiratory panel pending      RADIOLOGY  XR CHEST PORTABLE   Final Result   Bronchial wall thickening, perhaps mildly increased, bronchitis or reactive   airway disease. No consolidation is identified.          FL MODIFIED BARIUM SWALLOW W VIDEO    (Results Pending)         Assessment/Plan:    #Acute kidney injury on CKD stage IIIb  #Hyperkalemia  -creatinine of 2.3 on admission --> 1.6 today; baseline 1.3-1.7    -potassium of 7.1 on admission --> 4.8  >5.3 today after bicarb fluids and lokelma  -likely 2/2 ARB and dehydration  -held Lasix and Diovan  -nephrology consulted given IVF with HCO3  -resumed lasix now        #Acute asthma exacerbation  -tachypneic on admission with reported SOB and hypoxia  -covid/flu negative  -respiratory panel viral neg   -CXR WNL  -placed on Solu-Medrol and doxycycline  -inhaled bronchodilators  -BiPAP nightly but no daytime O2 supplementation at baseline  -maintaining O2 saturations >95% on 1.5 L O2 via NC, wean as tolerated   - improved overall and back to home level of o2     #Iron deficiency anemia  -Hgb 7.6 >7.4  with IVF  monitor  -trend H/H- ad transfuse if below 7    #Obstructive sleep apnea  -on BiPAP at night  -use as needed during the day     #Paroxysmal atrial fibrillation  -continue Rythmol and Eliquis- rate controlled      #Hypertension  -continue Norvasc Coreg and clonidine  -hold Diovan     #Carotid artery stenosis   -s/p CEA      #History of seizures  -continue Lamictal     #Morbid obesity  Weight loss helps      Recent prolonged resp failure needing tracheostomy and PEG placement  Trach removed and de cannulated, ongoing SLP eval , currently on TF    DVT Prophylaxis: eliquis  Diet: Diet NPO  ADULT TUBE FEEDING; PEG; Diabetic; Continuous; 55; No; 30; Q 4 hours  Code Status: Full Code    Dc planning after MBS study      Ashanti Garsia MD,12/7/2022 7:58 AM

## 2022-12-07 NOTE — PROCEDURES
Speech Language Pathology      Speech Language Pathology  Modified Barium Swallow Study  Facility/Department: [address] [place] [address] [facility]      Recommendations:  Diet recommendation: NPO exception of pleasure feeds of Puree and mildly (nectar) thick liquids; Meds via alt means of nutrition  Risk management: upright for all intake, stay upright for at least 30 mins after intake, small bites/sips, 1:1 supervision with intake, oral care 2-3x/day to reduce adverse affects in the event of aspiration, alternate bites/sips, occasional double swallow (*particularly with solid PO), slow rate of intake, general GERD precautions, general aspiration precautions, and hold PO and contact SLP if s/s of aspiration or worsening respiratory status develop. Allow ice chips only after oral care  Continued ST for dysphagia tx recommended after d/c      Baylee Alejandre  : 1940 ([de-identified] y.o.)   MRN: [id number]  ROOM: [address] [place] [address]  ADMISSION DATE: 12/3/2022  PATIENT DIAGNOSIS(ES): Dehydration [E86.0]  Bronchitis [J40]  FARHEEN (acute kidney injury) ([address] [N17.9]  Acute kidney injury ([address] [N17.9]  Acute respiratory failure with hypoxia ([address] [J96.01]  Chief Complaint   Patient presents with    Shortness of Breath     Pt brought in by EMS for SOB, EMS reported that pt was seen by PCP yesterday, blood work showed high potassium. Pt wears bipap machine at night. Pt reports feeling SOB after taking Bipap off this morning.  EMS gave breathing treatment enroute     Abnormal Lab     Patient Active Problem List    Diagnosis Date Noted    Hyperkalemia 2022    FARHEEN (acute kidney injury) ([address] 2022    Gastrostomy tube dysfunction ([address] 2022    Ventilator dependent ([address] 2022    Mild protein-calorie malnutrition ([address] 2022    Atrial fibrillation, chronic (HCC)     Mucus plugging of bronchi     Pulmonary congestion     Thrush 2022    MRSA colonization 2022    BETSY treated with BiPAP     Metabolic acidosis     Chronic anemia     Severe protein-calorie malnutrition ([address] 06/24/2022    Aspiration pneumonia of left lower lobe ([address]     Sensorineural hearing loss, bilateral 04/07/2022    Laceration of left lower extremity     Pulmonary nodules     C. difficile colitis 10/07/2021    Chest pain 09/27/2021    Pulmonary HTN ([address]     Mitral valve insufficiency     Sialadenitis     Colonic pseudoobstruction     Acute kidney injury superimposed on CKD ([address]     Chronic kidney disease 03/01/2020    Partial symptomatic epilepsy with complex partial seizures, not intractable, without status epilepticus ([address] 66/93/7211    Diastolic congestive heart failure ([address] 01/17/2019    Acute respiratory failure with hypoxia (HCC)     COPD (chronic obstructive pulmonary disease) ([address] 12/10/2018    Paroxysmal atrial fibrillation ([address]     Septicemia ([address] 09/08/2018    Severe obstructive sleep apnea     Hepatic steatosis     Diverticulosis     Blind left eye     Parkinson disease ([address] 05/22/2018    CAD (coronary artery disease)     Morbid obesity with BMI of 40.0-44.9, adult ([address] 02/10/2017    Type 2 diabetes mellitus with stage 4 chronic kidney disease, without long-term current use of insulin ([facility] 02/10/2017    Moderate persistent asthma 08/09/2016    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-sided sciatica 08/09/2016    Hypothyroidism 01/27/2015    Postural dizziness 02/20/2013    Anemia of chronic disease     GERD (gastroesophageal reflux disease)     Essential hypertension     Hyperlipidemia     Restless leg syndrome     L carotid artery stenosis s/p CEA 01/01/2002     Past Medical History:   Diagnosis Date    Anemia, unspecified 2010    neg bone marrow    Asthma     Atrial fibrillation ([address]     Baker's cyst     Basal cell carcinoma of left cheek 3/12/2019    Blind left eye     Carotid artery stenoses     Carotid stenosis 2002    left    Cervical spinal cord compression ([address] 11/2010    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-sided sciatica 8/9/2016    CKD (chronic kidney disease) stage 3, GFR 30-59 ml/min (HCC)     Community acquired pneumonia of left lower lobe of lung 9/8/2018    CRI     Detached retina, left     Diverticulosis     DJD (degenerative joint disease)     Edema     chronic    Fatty liver     GERD (gastroesophageal reflux disease)     Hearing loss     Herniated lumbar intervertebral disc     Hx stage 2 sacral decubitus ulcer 4/13/2018    Hyperlipidemia     Hypertension     Hypothyroid 1/27/2015    Morbid obesity with BMI of 40.0-44.9, adult ([address] 2/10/2017    BETSY treated with BiPAP     Parkinson's disease ([address]     Partial symptomatic epilepsy with complex partial seizures, not intractable, without status epilepticus ([address] 1/24/2020    Restless leg syndrome     Restless legs syndrome     Rheumatic fever 1950    2x IN [facility] [place]    Sleep apnea     Tremor, essential 8/16/2010    Type 2 diabetes mellitus without complication ([address]     Type II or unspecified type diabetes mellitus without mention of complication, not stated as uncontrolled      Past Surgical History:   Procedure Laterality Date    APPENDECTOMY      BRONCHOSCOPY N/A 7/7/2022    BRONCHOSCOPY ALVEOLAR LAVAGE performed by Paramjit Garcia MD at [place] [address]  7/7/2022    BRONCHOSCOPY [address] performed by Paramjit Garcia MD at [address]      left    CATARACT REMOVAL      CERVICAL FUSION  11/2010    CHOLECYSTECTOMY      COLONOSCOPY N/A 3/8/2020    COLONOSCOPY FLEXIBLE W/ DECOMPRESSION performed by Brianna Baker MD at [address] [facility] [address]  11/8/2022    GASTROSTOMY/PEG TUBE CHANGE performed by Anya Whatley DO at [address] (CERVIX STATUS UNKNOWN)      IR MIDLINE CATH  10/8/2021    IR MIDLINE CATH 10/8/2021 [facility]    JOINT REPLACEMENT Bilateral 2003    KNEE SURGERY      replacement x 2    OVARY REMOVAL      SHOULDER SURGERY      TONSILLECTOMY      TRACHEOSTOMY N/A 7/15/2022    TRACHEOTOMY WITH PERCUTANEOUS ENDOSCOPIC GASTROSTOMY TUBE PLACEMENT performed by Nannette Orellana MD at [address]  03/17/2020    Esophagitis    UPPER GASTROINTESTINAL ENDOSCOPY N/A 3/17/2020    EGD performed by Mukund Zhao MD at [facility]  11/8/2022    EGD ESOPHAGOGASTRODUODENOSCOPY performed by Jorge L Bill DO at [address]       Allergies   Allergen Reactions    Levaquin [Levofloxacin]      Pt was given in er and developed hives and redness    Metoclopramide     Phenazopyridine     Pyridium [Phenazopyridine Hcl]     Reglan [Metoclopramide Hcl]     Reglan [Metoclopramide Hcl]      tremors         Current Diet Solid Consistency: NPO; w/ PEG  Current Diet Liquid Consistency: NPO; w/ PEG    Date of Prior Study: 6/30/22, 7/7/22  Type of Study: FEES  Results of Prior Study: Pureed diet with mildly (nectar) thick liquids recommended, meds crushed in puree as able    Recent CXR (12/3/22): Impression   Bronchial wall thickening, perhaps mildly increased, bronchitis or reactive   airway disease. No consolidation is identified. Patient Complaints/Reason for Referral:  Gisele Suazo was referred for a MBS to assess the efficiency of his/her swallow function, assess for aspiration, and to make recommendations regarding safe dietary consistencies, effective compensatory strategies, and safe eating environment.     Pain   Patient Currently in Pain: No    General Comments:   Per MD H&P, \"An 82-year-old female with a history of morbid obesity, asthma, obstructive sleep apnea on BiPAP at night, hypertension, Parkinson's disease, status post G-tube placement, presented to the emergency room with shortness of breath and hyperkalemia  Patient was having difficulty breathing for the past day or so on the  was thinking about bringing the patient to the emergency room. At the same time the primary care physician's office called to tell them that her potassium was very high. ER labs showed a potassium of 7.1. Treated medically. Continued on BiPAP given respiratory difficulty. Patient denies having any cough. No fever or chills. No chest pain. Denies nausea vomiting or diarrhea. Patient is allergic to levaquin [levofloxacin], metoclopramide, phenazopyridine, pyridium [phenazopyridine hcl], reglan [metoclopramide hcl], and reglan [metoclopramide hcl]\". Per BSE completed 12/5/22, \"Pt admitted d/t FARHEEN, acute respiratory failure with hypoxia, bronchitis, dehydration. Pt with multiple recent hospitalizations d/t G-tube dysfunction (11/30/22). Pt has PMHx of BETSY, Parkinson's disease, and GERD per chart. BSE completed 11/8/22 with recommendations for continued NPO with completion of instrumental swallow study via FEES or MBSS to correlate clinical findings & prior to possible PO intake. Per BSE completed 11/8/22, \"Pt had FEES completed 7/7/22 which recommended puree solids (IDDSI 4) with mildly (nectar) thick liquids, crushable meds crushed in puree, ice chips OK after oral care. Pt was discharged from [address] [place] [address] on 7/12/22 d/t re-intubation. Per pt's  at bedside, pt required trach and PEG placement (7/15/22 per chart), pt was transferred to [facility] from [facility]. The pt is now at [facility] where she receives ST \"three times a week\" per pt and pt's . They reported that the pt is allowed ice chips after oral care and receives some PO with SLP. Pt's  reported that the pt has had 2 mobile MBSS completed at [facility] with most recent study \"about a month ago\"\".       Medical record review/interview:   Predisposing dysphagia risk factors: Parkinson's Disease, Other chronic respiratory illness, Hx of Trach/PEG, GERD, Hx of dysphagia, and Hx of aspiration  Clinical signs of possible chronic dysphagia: current use of PEG/TF, hx of dysphagia, hx of aspiration, and hx of tracheostomy  Precipitating dysphagia risk factors: reduced physical mobility, increased O2 demands, and suspected disuse atrophy       Impressions:  Treatment Dx: Oropharyngeal dysphagia  Radiologist: Dr. Raylene Mcardle  Referring MD: Dr. Wagner Leader:    Oral Preparation / Oral Phase  Impaired  Oral Phase - Major Contributing Deficits  Weak Lingual Manipulation: All  Lingual Pumping: Puree  Reduced Bolus Control: All  Decreased Bolus Cohesion: All  Lingual / Palatal Residue: All; minimal  Premature Bolus Loss to Pharynx: All     Pharyngeal Phase  Impaired  Pharyngeal Phase - Major Contributing Deficits  Delayed Swallow Initiation: All  Premature Spillage to Valleculae: All  Premature Spillage to Pyriform: Thin tsp, Thin cup sip, Thin straw   Reduced Pharyngeal Peristalsis: All  Reduced Epiglottic Retroversion: All  Reduced Laryngeal Elevation:  All  Reduced Anterior Laryngeal Movement: All  Reduced Thyrohyoid Approximation: All  Reduced Airway/laryngeal Closure: All  Reduced Tongue Base Retraction:  All  Shallow Penetration During: Thin tsp, Thin straw   Parital Self-clearing (shallow): Thin tsp, Thin straw  Deep Penetration Before: Thin straw   Deep Penetration During: Thin cup sip  Partial Retrieval (deep): Thin straw (*with cued cough)  No Retrieval (deep): Thin cup sip  Aspiration After: Thin cup sip, Thin straw  Trace Aspiration: Thin cup sip, Thin straw  No Cough Reflex: Thin cup sip, Thin straw  Weak Cough Reflex: Thin tsp, Thin cup sip, Thin straw (*when cued by SLP)  Pharyngeal Residue - Valleculae: All  Pharyngeal Residue - Pyriform: All  Pharyngeal Residue - Posterior Pharynx: All  Pharyngeal Wall - Weakness:  All  Fatigue of Mechanism: All  Pharyngeal phase comment: Reduced hyolaryngeal excursion, reduced epiglottic retroversion, and delayed swallow vocal folds, and no effort is made to eject. Compensatory Swallowing Strategies Attempted: small bites/sips, alternate bites/sips, double swallow, upright positioning with PO  Postural Changes and/or Swallow Maneuvers Trialed: pt unable to effectively complete chin tuck strategy  Patient Position: Lateral and Patient Degrees: 90 degrees, Seated upright [facility]  Consistencies Administered: Thin-tsp, cup, straw; Mildly (nectar) thick-cup, straw; Puree      Recommendations:  Diet recommendation: NPO exception of pleasure feeds of Puree and mildly (nectar) thick liquids; Meds via alt means of nutrition  Risk management: upright for all intake, stay upright for at least 30 mins after intake, small bites/sips, 1:1 supervision with intake, oral care 2-3x/day to reduce adverse affects in the event of aspiration, alternate bites/sips, occasional double swallow (*particularly with solid PO), slow rate of intake, general GERD precautions, general aspiration precautions, and hold PO and contact SLP if s/s of aspiration or worsening respiratory status develop.   Allow ice chips only after oral care  Continued ST for dysphagia tx recommended after d/c    Safe Swallow Protocol:  Supervision: 1:1  Compensatory Swallowing Strategies: HOB 90* and 30\" after meals; small bites/sips; alternate solids/liquids every 3-5 bites; oral care after every meal      Behavior/Cognition/Vision/Hearing:  Behavior/Cognition: alert, cooperative, pleasant  Vision: adequate  Hearing: adequate    Recommendations/Treatment  Requires SLP Intervention: Yes  D/C Recommendations: home ST  Postural Changes and/or Swallow Maneuvers: n/a  Referral To: n/a    Recommended Exercises: laryngeal/pharyngeal strengthening exercises  Therapeutic Interventions: diet tolerance monitoring, patient/family education, oral care     Education: Images and recommendations were reviewed with pt following this exam.   Patient Education Response: pt verbalized understanding, would benefit from continued reinforcement. Results/recs reviewed with pt's  after completion of study, handout provided. RN notified of recs. Prognosis for safe diet advancement: fair  Barriers to reach goals: generalized weakness, severity of oropharyngeal dysphagia, Parkinson's disease, length of NPO status. Duration/Frequency of Treatment: 3-5x/week for LOS  Safety Devices in place: Yes  Type of devices: Pt left MBSS in no distress; left with transport and .       Goals:    Short Term Goals:  Timeframe for Short Term Goals: (5 days, 12/10/22)  Goal 1: The patient will tolerate recommended diet with no clinical s/s of aspiration 5/5  Goal 2: The patient/caregiver will demonstrate understanding of compensatory swallow strategies, for improved swallow safety  Goal 3: The patient will tolerate instrumental assessment when able      Long Term Goals:   Timeframe for Long Term Goals: (7 days, 12/12/22)  Goal 1: The patient will tolerate least restrictive diet with no clinical s/s of aspiration or worsening respiratory/pulmonary status      Therapy Time:   Individual Concurrent Group Co-treatment   Time In 1305         Time Out 1330         Minutes 25           MBSS procedure and dysphagia tx    Signature:  Malika Casas M.S. [id number] [facility]  Speech-language pathologist  [phone]

## 2022-12-07 NOTE — PROGRESS NOTES
12/06/22 0984   NIV Type   NIV Started/Stopped (S)  On   Equipment Type v60   Mode Bilevel   Mask Type Full face mask   Mask Size Medium   Settings/Measurements   IPAP 22 cmH20   CPAP/EPAP 15 cmH2O   Vt (Measured) 402 mL   Rate Ordered 14   Resp 29   FiO2  30 %   I Time/ I Time % 1 s   Minute Volume (L/min) 8.7 Liters   Mask Leak (lpm) 0 lpm   Comfort Level Good   Using Accessory Muscles No   SpO2 96   Patient's Home Machine No   Alarm Settings   Alarms On Y   Low Pressure (cmH2O) 5 cmH2O   High Pressure (cmH2O) 30 cmH2O   Delay Alarm 20 sec(s)   RR Low (bpm) 14   RR High (bpm) 40 br/min

## 2022-12-07 NOTE — PROGRESS NOTES
RT Inhaler-Nebulizer Bronchodilator Protocol Note    There is a bronchodilator order in the chart from a provider indicating to follow the RT Bronchodilator Protocol and there is an Initiate RT Inhaler-Nebulizer Bronchodilator Protocol order as well (see protocol at bottom of note). CXR Findings:  No results found. The findings from the last RT Protocol Assessment were as follows:   History Pulmonary Disease: Chronic pulmonary disease  Respiratory Pattern: Dyspnea on exertion or RR 21-25 bpm  Breath Sounds: Slightly diminished and/or crackles  Cough: Strong, spontaneous, non-productive  Indication for Bronchodilator Therapy: Decreased or absent breath sounds  Bronchodilator Assessment Score: 6    Aerosolized bronchodilator medication orders have been revised according to the RT Inhaler-Nebulizer Bronchodilator Protocol below. Respiratory Therapist to perform RT Therapy Protocol Assessment initially then follow the protocol. Repeat RT Therapy Protocol Assessment PRN for score 0-3 or on second treatment, BID, and PRN for scores above 3. No Indications - adjust the frequency to every 6 hours PRN wheezing or bronchospasm, if no treatments needed after 48 hours then discontinue using Per Protocol order mode. If indication present, adjust the RT bronchodilator orders based on the Bronchodilator Assessment Score as indicated below. Use Inhaler orders unless patient has one or more of the following: on home nebulizer, not able to hold breath for 10 seconds, is not alert and oriented, cannot activate and use MDI correctly, or respiratory rate 25 breaths per minute or more, then use the equivalent nebulizer order(s) with same Frequency and PRN reasons based on the score. If a patient is on this medication at home then do not decrease Frequency below that used at home.     0-3 - enter or revise RT bronchodilator order(s) to equivalent RT Bronchodilator order with Frequency of every 4 hours PRN for wheezing or increased work of breathing using Per Protocol order mode. 4-6 - enter or revise RT Bronchodilator order(s) to two equivalent RT bronchodilator orders with one order with BID Frequency and one order with Frequency of every 4 hours PRN wheezing or increased work of breathing using Per Protocol order mode. 7-10 - enter or revise RT Bronchodilator order(s) to two equivalent RT bronchodilator orders with one order with TID Frequency and one order with Frequency of every 4 hours PRN wheezing or increased work of breathing using Per Protocol order mode. 11-13 - enter or revise RT Bronchodilator order(s) to one equivalent RT bronchodilator order with QID Frequency and an Albuterol order with Frequency of every 4 hours PRN wheezing or increased work of breathing using Per Protocol order mode. Greater than 13 - enter or revise RT Bronchodilator order(s) to one equivalent RT bronchodilator order with every 4 hours Frequency and an Albuterol order with Frequency of every 2 hours PRN wheezing or increased work of breathing using Per Protocol order mode.        Electronically signed by Marti Gordon RCP on 12/6/2022 at 8:28 PM

## 2022-12-07 NOTE — PROGRESS NOTES
RT Nebulizer Bronchodilator Protocol Note    There is a bronchodilator order in the chart from a provider indicating to follow the RT Bronchodilator Protocol and there is an Initiate RT Bronchodilator Protocol order as well (see protocol at bottom of note). CXR Findings:  No results found. The findings from the last RT Protocol Assessment were as follows:  Smoking: (P) Chronic pulmonary disease  Respiratory Pattern: (P) Dyspnea on exertion or RR 21-25 bpm  Breath Sounds: (P) Slightly diminished and/or crackles  Cough: (P) Strong, spontaneous, non-productive  Indication for Bronchodilator Therapy: (P) Decreased or absent breath sounds  Bronchodilator Assessment Score: (P) 6    Aerosolized bronchodilator medication orders have been revised according to the RT Nebulizer Bronchodilator Protocol below. Respiratory Therapist to perform RT Therapy Protocol Assessment initially then follow the protocol. Repeat RT Therapy Protocol Assessment PRN for score 0-3 or on second treatment, BID, and PRN for scores above 3. No Indications - adjust the frequency to every 6 hours PRN wheezing or bronchospasm, if no treatments needed after 48 hours then discontinue using Per Protocol order mode. If indication present, adjust the RT bronchodilator orders based on the Bronchodilator Assessment Score as indicated below. If a patient is on this medication at home then do not decrease Frequency below that used at home. 0-3 - enter or revise RT bronchodilator order(s) to equivalent RT Bronchodilator order with Frequency of every 4 hours PRN for wheezing or increased work of breathing using Per Protocol order mode. 4-6 - enter or revise RT Bronchodilator order(s) to two equivalent RT bronchodilator orders with one order with BID Frequency and one order with Frequency of every 4 hours PRN wheezing or increased work of breathing using Per Protocol order mode.          7-10 - enter or revise RT Bronchodilator order(s) to two equivalent RT bronchodilator orders with one order with TID Frequency and one order with Frequency of every 4 hours PRN wheezing or increased work of breathing using Per Protocol order mode. 11-13 - enter or revise RT Bronchodilator order(s) to one equivalent RT bronchodilator order with QID Frequency and an Albuterol order with Frequency of every 4 hours PRN wheezing or increased work of breathing using Per Protocol order mode. Greater than 13 - enter or revise RT Bronchodilator order(s) to one equivalent RT bronchodilator order with every 4 hours Frequency and an Albuterol order with Frequency of every 2 hours PRN wheezing or increased work of breathing using Per Protocol order mode. RT to enter RT Home Evaluation for COPD & MDI Assessment order using Per Protocol order mode.     Electronically signed by Suki Velasquez RCP on 12/7/2022 at 8:53 AM

## 2022-12-07 NOTE — PLAN OF CARE
Problem: Discharge Planning  Goal: Discharge to home or other facility with appropriate resources  12/6/2022 2225 by Ana Wisdom RN  Outcome: Progressing  12/6/2022 1845 by Delfino Wilburn RN  Outcome: Progressing     Problem: Safety - Adult  Goal: Free from fall injury  12/6/2022 2225 by Ana Wisdom RN  Outcome: Progressing  12/6/2022 1845 by Delfino Wilburn RN  Outcome: Progressing     Problem: Skin/Tissue Integrity  Goal: Absence of new skin breakdown  Description: 1. Monitor for areas of redness and/or skin breakdown  2. Assess vascular access sites hourly  3. Every 4-6 hours minimum:  Change oxygen saturation probe site  4. Every 4-6 hours:  If on nasal continuous positive airway pressure, respiratory therapy assess nares and determine need for appliance change or resting period.   12/6/2022 2225 by Ana Wisdom RN  Outcome: Progressing  12/6/2022 1845 by Delfino Wilburn RN  Outcome: Progressing

## 2022-12-08 ENCOUNTER — CARE COORDINATION (OUTPATIENT)
Dept: CASE MANAGEMENT | Age: 82
End: 2022-12-08

## 2022-12-08 ENCOUNTER — CARE COORDINATION (OUTPATIENT)
Dept: CARE COORDINATION | Age: 82
End: 2022-12-08

## 2022-12-08 RX ORDER — FORMOTEROL FUMARATE 20 UG/2ML
20 SOLUTION RESPIRATORY (INHALATION) EVERY 12 HOURS SCHEDULED
Qty: 120 ML | Refills: 0 | Status: SHIPPED | OUTPATIENT
Start: 2022-12-08 | End: 2023-01-07

## 2022-12-08 NOTE — CARE COORDINATION
Remote Patient Monitoring Welcome Note      Date/Time:  2022 1:31 PM     Verified patients name and  as identifiers. Completed and confirmed the following:     Emergency Contact: Kameron Greenberg, Spouse    [x] Patient received all RPM equipment (tablet, scale, blood pressure device and cuff, and pulse oximeter)  Cuff Size: Small  []  Regular   [x]  Large  []   Weight Scale: Regular   [x]  Bariatric  []               [x] Instructed patient keep box for use when returning equipment                                                          [x] Reviewed Patient Welcome Letter with patient                         [x] Reviewed expectations for patient and care team  [x] Reviewed RPM consent form         [x] Instructed patient to keep scale on flat surface                                                         [x] Instructed patient to keep tablet plugged in at all times                         [x] Instructed how to contact IT support (number listed on welcome letter)  [x] Provided Remote Patient Monitoring care  information                 All questions answered at this time.    Parmjit Singer LPN, [facility]: [phone]

## 2022-12-08 NOTE — CARE COORDINATION
DISCHARGE ORDER  Date/Time 2022 9:35 AM  Completed by: Kam Pratt RN, Case Management    Patient Name: Sanjeev Kaplan      : 1940  Admitting Diagnosis: Dehydration [E86.0]  Bronchitis [J40]  FARHEEN (acute kidney injury) ([address] [N17.9]  Acute kidney injury ([address] [N17.9]  Acute respiratory failure with hypoxia ([address] [J96.01]      Admit order Date and Status:12/3/2022  (verify MD's last order for status of admission)      Noted discharge order. Discharge Plan: Reviewed chart. Pt was d/c'd on 2022. CM noted that pt was BOBY with [facility] and CM informed Mara with [facility] of this for PT/OT/SN. +HC orders, +eCOC. Pt will purchase a STEDY after d/c and declined anything further. OPt is active with [facility] for hoem O2 and her SpO2 was 97% at 1L at d/c. Date of Last IMM Given: 2022    Reviewed chart. Role of discharge planner explained and patient verbalized understanding. Discharge order is noted. Has Home O2 in place on admit:  Yes  Informed of need to bring portable home O2 tank on day of discharge for nursing to connect prior to leaving:   Yes  Verbalized agreement/Understanding:   Yes    No time out was done at d/c as pt was d/c'd after CM left the floor.

## 2022-12-08 NOTE — CARE COORDINATION
12/8  [facility]  CTN aware of pt discharge 12/7. Updated [facility] team. Referral sent to [facility] for BOBY.       Electronically signed by Padmini Gardner RN on 12/8/2022 at 1:48 PM

## 2022-12-08 NOTE — TELEPHONE ENCOUNTER
Ok to hold on weights until pt able. She should have PT/OT coming to house who may be able to assist with getting weights on those days, otherwise ok to postpone until she is safely able to get them.

## 2022-12-08 NOTE — TELEPHONE ENCOUNTER
Detailed message left for Jarad Steward on her secure and confidential voicemail giving the verbal order.

## 2022-12-08 NOTE — CARE COORDINATION
Ambulatory Care Coordination Note  12/8/2022    ACC: Jerri Humphrey RN    ACM touched base with Wes[facility],who is verified on HIPPA form that patient was back home. Allen Ojeda said he has already contacted [place] [id number] to get services restarted for patient. Allen Ojeda has an oxygen concentrator. Allen Ojeda said he has opted not to purchase the Lito lift equipment at this time d/t not have spacing and the slide board is working well for him. Allen Ojeda will be calling the office to get a Hospital follow up appt scheduled. Allen Ojeda had no other questions or concerns at this time. Plan:    Route to RPM to un-pause patient  F/U call 1 week  CHF Tuck Me in Education    Offered patient enrollment in the Remote Patient Monitoring (RPM) program for in-home monitoring: Yes, patient enrolled. Lab Results       None            Care Coordination Interventions    Referral from Primary Care Provider: No  Suggested Interventions and [address] [place] [address] Hwy: In Process (Comment: [facility] SN)  Senior Services: In Process (Comment: Interested in referral once [place] [address] is completed)  Zone Management Tools: In Process (Comment: DM, CHF, COPD)          Goals Addressed    None         Prior to Admission medications    Medication Sig Start Date End Date Taking?  Authorizing Provider   doxycycline hyclate (VIBRA-TABS) 100 MG tablet Take 1 tablet by mouth 2 times daily for 3 days 12/7/22 12/10/22  Goyo Woo MD   predniSONE (DELTASONE) 10 MG tablet 30 mg x 3 days, 20 mg x 3 days, 10 mg x 3 days then stop 12/7/22   Goyo Woo MD   scopolamine (TRANSDERM-SCOP) transdermal patch Place 1 patch onto the skin every 72 hours 12/2/22 1/1/23  MAHAD Gudino CNP   cetirizine (ZYRTEC) 10 MG tablet 1 tablet by Per G Tube route daily 12/2/22 1/1/23  MAHAD Gudino CNP   simethicone (MYLICON) 40 AV/7.2YW LIQD drops 0.6 mLs by Per G Tube route in the morning, at noon, and at bedtime  Patient not taking: Reported on 12/3/2022 12/2/22   MAHAD Hardy CNP   propafenone (RYTHMOL) 150 MG tablet 1 tablet by Per G Tube route every 8 hours 12/2/22   MAHAD Hardy CNP   montelukast (SINGULAIR) 10 MG tablet 1 tablet by Per G Tube route daily TAKE 1 TABLET EVERY DAY 12/2/22   MAHAD Hardy CNP   lamoTRIgine (LAMICTAL) 25 MG tablet 2 tablets by Per G Tube route 2 times daily 12/2/22   MAHAD Hardy CNP   furosemide (LASIX) 40 MG tablet 1 tablet by Per G Tube route daily 12/2/22   MAHAD Hardy CNP   ferrous sulfate (IRON 325) 325 (65 Fe) MG tablet 1 tablet by Per G Tube route daily (with breakfast) 12/2/22   MAHAD Hardy CNP   escitalopram (LEXAPRO) 10 MG tablet 1 tablet by Per G Tube route daily 12/2/22   MAHAD Hardy CNP   cloNIDine (CATAPRES) 0.1 MG tablet 1 tablet by Per G Tube route 2 times daily 12/2/22   MAHAD Hardy CNP   carvedilol (COREG) 12.5 MG tablet 1 tablet by Per G Tube route 2 times daily (with meals) 12/2/22   MAHAD Hardy CNP   carbidopa-levodopa (SINEMET)  MG per tablet 1 tablet by Per G Tube route 4 times daily 12/2/22   MAHAD Hardy CNP   atorvastatin (LIPITOR) 40 MG tablet 1.5 tablets by Per G Tube route daily TAKE 1 AND 1/2 TABLETS EVERY DAY 12/2/22   MAHAD Hardy CNP   apixaban (ELIQUIS) 2.5 MG TABS tablet 1 tablet by Per G Tube route 2 times daily 12/2/22   MAHAD Hardy CNP   amLODIPine (NORVASC) 5 MG tablet 1 tablet by Per G Tube route daily 12/2/22   ErrAMHAD Michael CNP   budesonide (PULMICORT) 0.25 MG/2ML nebulizer suspension Take 2 mLs by nebulization 2 times daily 12/2/22   MAHAD Hardy CNP   formoterol (PERFOROMIST) 20 MCG/2ML nebulizer solution Take 2 mLs by nebulization every 12 hours Take 20 mcg by nebulization every 12 hours 12/2/22 1/1/23  Nidia Cuevas MAHAD Morales - CNP   acetaminophen (TYLENOL) 325 MG tablet Take 650 mg by mouth every 6 hours as needed for Pain    Historical Provider, MD   guaiFENesin (ROBITUSSIN) 100 MG/5ML SOLN oral solution Take 200 mg by mouth every 6 hours as needed for Cough    Historical Provider, MD   senna-docusate (Carmelia Wesley) 8.6-50 MG per tablet Take 1 tablet by mouth in the morning and at bedtime  Patient not taking: Reported on 12/3/2022    Historical Provider, MD   acetylcysteine (MUCOMYST) 20 % nebulizer solution Inhale 3 mLs into the lungs 2 times daily 7/19/22   Sherrie Shrestha MD   valsartan (DIOVAN) 80 MG tablet Take 1 tablet by mouth daily 5/24/22 7/19/22  Dominic Cam MD   NIFEdipine (PROCARDIA XL) 90 MG extended release tablet TAKE 1 TABLET EVERY DAY 3/2/22 7/19/22  MAHAD Flores - CNP   Multiple Vitamins-Minerals (THERAPEUTIC MULTIVITAMIN-MINERALS) tablet Take 1 tablet by mouth daily    Historical Provider, MD   solifenacin (VESICARE) 5 MG tablet Take 1 tablet by mouth daily.  6/25/13 10/29/13  Johan Burr MD       Future Appointments   Date Time Provider Katy Booker   3/6/2023  3:30 PM [facility]  [facility]

## 2022-12-08 NOTE — TELEPHONE ENCOUNTER
----- Message from Helga Serrano MD sent at 8/26/2019  2:47 PM CDT -----  A1 c was normal.  However, total cholesterol is high at 343 and has not changed in the last 7 months.  LDL is also very high at 262 which has not changed.  Triglycerides have increased to borderline high now at 158. Seven months ago at was 136.  I am recommending that he reach out to his PCP to discuss whether PCP would want to put him on a medication for the cholesterol.  Continue psychiatric medication as is.   They were calling again today on this message. Doesn't look like message was ever routed.  Please advise

## 2022-12-09 ENCOUNTER — CARE COORDINATION (OUTPATIENT)
Dept: CARE COORDINATION | Age: 82
End: 2022-12-09

## 2022-12-09 NOTE — CARE COORDINATION
Remote Patient Monitoring Note      Date/Time:  12/9/2022 11:33 AM    CCSS reviewed patients reported daily Remote Patient Monitoring metrics. All reported metrics are within alert parameters. Plan/Follow Up:  Will continue to review, monitor and address alerts with follow up based on severity of symptoms and risk factors  --- Current Patient Metrics ---- Blood Pressure: 150/92, 61bpm Glucose: 197mg/dl Pulseox: 94%, 61bpm Survey: - Note Created at: 12/09/2022 11:32 AM ET ---- Time-Spent: 2 minutes 0 seconds

## 2022-12-12 ENCOUNTER — CARE COORDINATION (OUTPATIENT)
Dept: CARE COORDINATION | Age: 82
End: 2022-12-12

## 2022-12-12 ENCOUNTER — CARE COORDINATION (OUTPATIENT)
Dept: CASE MANAGEMENT | Age: 82
End: 2022-12-12

## 2022-12-12 RX ORDER — ACETYLCYSTEINE 200 MG/ML
600 SOLUTION ORAL; RESPIRATORY (INHALATION) 2 TIMES DAILY
Qty: 30 ML | Refills: 5 | Status: SHIPPED | OUTPATIENT
Start: 2022-12-12

## 2022-12-12 NOTE — CARE COORDINATION
Remote Patient Monitoring Note      Date/Time:  12/12/2022 11:24 AM    CCSS reviewed patients reported daily Remote Patient Monitoring metrics. All reported metrics are within alert parameters. Plan/Follow Up:  Will continue to review, monitor and address alerts with follow up based on severity of symptoms and risk factors  - Current Patient Metrics ---- Blood Pressure: 140/69, 58bpm Glucose: 125mg/dl Pulseox: 98%, 62bpm Survey: - Note Created at: 12/12/2022 11:23 AM ET ---- Time-Spent: 2 minutes 0 seconds

## 2022-12-12 NOTE — PROGRESS NOTES
Formoterol was sent 12/8 to take the place of budesonide. Mucomyst refill is sent to [place] [phone].

## 2022-12-12 NOTE — TELEPHONE ENCOUNTER
Pt  states they need a refill for Amlodapine. The chart shows this was sent in on 12/2/22 #90 with 1 refill. Pt  states [facility] did not give this to them. [facility] to [facility] and they [facility]. I called pt  and informed him also.

## 2022-12-12 NOTE — CARE COORDINATION
Remote Alert Monitoring Note      Date/Time:  2022 10:33 AM  LPN contacted patient by telephone regarding yellow alert received for blood pressure reading (175/81). Verified patients name and  as identifiers. Background: RPM for KD, HTN, DM, COPD Refer to 911 immediately if:  Patient unresponsive or unable to provide history  Change in cognition or sudden confusion  Patient unable to respond in complete sentences  Intense chest pain/tightness  Any concern for any clinical emergency  Red Alert: Provider response time of 1 hr required for any red alert requiring intervention  Yellow Alert: Provider response time of 3hr required for any escalated yellow alert BP Triage Are you having any Chest Pain> no Are you having any Shortness of Breath? no Do you have a headache or have any vision changes> no Are you having any numbness or tingling> no Are you having any other health concerns or issues? no Clinical Interventions: Reviewed and followed up on alerts and treatments-LPN contacted pt's spouse in regards to yellow alert for BP of 175/81. Spouse denied that the pt is having any symptoms. Spouse also stated that he did not give pt her BP meds prior to checking BP. Spouse will recheck BP around noon after therapist has left and pt has had at least one hour to rest. UPDATE- Pt's new BP reading is 140/69, WNL. Plan/Follow Up: Will continue to review, monitor and address alerts with follow up based on severity of symptoms and risk factors.  ---- Current Patient Metrics ---- Blood Pressure: 140/69, 58bpm Glucose: 125mg/dl Pulseox: 98%, 62bpm Survey: - Note Created at: 2022 12:43 PM ET ---- Time-Spent: 10 minutes 0 seconds

## 2022-12-13 ENCOUNTER — CARE COORDINATION (OUTPATIENT)
Dept: CARE COORDINATION | Age: 82
End: 2022-12-13

## 2022-12-13 ENCOUNTER — TELEPHONE (OUTPATIENT)
Dept: FAMILY MEDICINE CLINIC | Age: 82
End: 2022-12-13

## 2022-12-13 NOTE — CARE COORDINATION
Remote Patient Monitoring Note      Date/Time:  12/13/2022 3:48 PM    CCSS reviewed patients reported daily Remote Patient Monitoring metrics. All reported metrics are within alert parameters. Plan/Follow Up:  Will continue to review, monitor and address alerts with follow up based on severity of symptoms and risk factors  --- Current Patient Metrics ---- Blood Pressure: 147/70, 58bpm Glucose: -mg/dl Pulseox: 97%, 57bpm Survey: - Note Created at: 12/13/2022 03:47 PM ET ---- Time-Spent: 2 minutes 0 seconds

## 2022-12-13 NOTE — TELEPHONE ENCOUNTER
Real Terrazas called with [address] needing verbal orders for OT and speech evaluation. OT needing two times a week for four weeks Real Terrazas good contact Is [phone].  Line is secured

## 2022-12-14 ENCOUNTER — CARE COORDINATION (OUTPATIENT)
Dept: CARE COORDINATION | Age: 82
End: 2022-12-14

## 2022-12-14 ENCOUNTER — TELEPHONE (OUTPATIENT)
Dept: FAMILY MEDICINE CLINIC | Age: 82
End: 2022-12-14

## 2022-12-14 DIAGNOSIS — E87.5 HYPERKALEMIA: Primary | ICD-10-CM

## 2022-12-14 DIAGNOSIS — N18.4 CHRONIC KIDNEY DISEASE, STAGE 4 (SEVERE) (HCC): ICD-10-CM

## 2022-12-14 DIAGNOSIS — D64.9 ANEMIA, UNSPECIFIED TYPE: ICD-10-CM

## 2022-12-14 NOTE — CARE COORDINATION
Remote Patient Monitoring Note      Date/Time:  12/14/2022 1:05 PM    CCSS reviewed patients reported daily Remote Patient Monitoring metrics. All reported metrics are within alert parameters. Plan/Follow Up:  Will continue to review, monitor and address alerts with follow up based on severity of symptoms and risk factors  --- Current Patient Metrics ---- Blood Pressure: 152/79, 69bpm Glucose: 220mg/dl Pulseox: 97%, 66bpm Survey: - Note Created at: 12/14/2022 01:04 PM ET ---- Time-Spent: 2 minutes 0 seconds

## 2022-12-14 NOTE — TELEPHONE ENCOUNTER
Received phone call from [employer] with [facility] in regards to report an elevated BP that was 174/84 at 4:00pm on 12/14/2022 this is just as an FYI on a BP reading due to it being out of there perimeters.

## 2022-12-14 NOTE — TELEPHONE ENCOUNTER
Duana Habermann from [facility] care called wanting to know if PCP will follow for home care.  Duana Habermann # [phone] and has a secured VM

## 2022-12-14 NOTE — TELEPHONE ENCOUNTER
Herrera Jordan called with [facility] care stating two nurses have been out to the patients home and have been unable to get the patients blood for CBC and metabolic panel orders. Herrera Jordan wants to know what to do.  Please advise

## 2022-12-15 ENCOUNTER — HOSPITAL ENCOUNTER (OUTPATIENT)
Age: 82
Setting detail: SPECIMEN
Discharge: HOME | End: 2022-12-15
Payer: MEDICARE

## 2022-12-15 ENCOUNTER — CARE COORDINATION (OUTPATIENT)
Dept: CARE COORDINATION | Age: 82
End: 2022-12-15

## 2022-12-15 LAB
ALBUMIN SERPL-MCNC: 3.3 G/DL (ref 3.4–5)
ANION GAP SERPL CALCULATED.3IONS-SCNC: 6 MMOL/L (ref 3–16)
BASOPHILS ABSOLUTE: 0 K/UL (ref 0–0.2)
BASOPHILS RELATIVE PERCENT: 0.3 %
BUN BLDV-MCNC: 56 MG/DL (ref 7–20)
CALCIUM SERPL-MCNC: 8.1 MG/DL (ref 8.3–10.6)
CHLORIDE BLD-SCNC: 99 MMOL/L (ref 99–110)
CO2: 32 MMOL/L (ref 21–32)
CREAT SERPL-MCNC: 1.4 MG/DL (ref 0.6–1.2)
EOSINOPHILS ABSOLUTE: 0.1 K/UL (ref 0–0.6)
EOSINOPHILS RELATIVE PERCENT: 0.8 %
GFR SERPL CREATININE-BSD FRML MDRD: 38 ML/MIN/{1.73_M2}
GLUCOSE BLD-MCNC: 142 MG/DL (ref 70–99)
HCT VFR BLD CALC: 24.4 % (ref 36–48)
HEMOGLOBIN: 7.7 G/DL (ref 12–16)
LYMPHOCYTES ABSOLUTE: 0.7 K/UL (ref 1–5.1)
LYMPHOCYTES RELATIVE PERCENT: 4.3 %
MCH RBC QN AUTO: 28.1 PG (ref 26–34)
MCHC RBC AUTO-ENTMCNC: 31.5 G/DL (ref 31–36)
MCV RBC AUTO: 89.2 FL (ref 80–100)
MONOCYTES ABSOLUTE: 0.6 K/UL (ref 0–1.3)
MONOCYTES RELATIVE PERCENT: 3.5 %
NEUTROPHILS ABSOLUTE: 14.8 K/UL (ref 1.7–7.7)
NEUTROPHILS RELATIVE PERCENT: 91.1 %
PDW BLD-RTO: 13 % (ref 12.4–15.4)
PHOSPHORUS: 4.1 MG/DL (ref 2.5–4.9)
PLATELET # BLD: 195 K/UL (ref 135–450)
PMV BLD AUTO: 10 FL (ref 5–10.5)
POTASSIUM SERPL-SCNC: 5.9 MMOL/L (ref 3.5–5.1)
RBC # BLD: 2.74 M/UL (ref 4–5.2)
SODIUM BLD-SCNC: 137 MMOL/L (ref 136–145)
WBC # BLD: 16.3 K/UL (ref 4–11)

## 2022-12-15 PROCEDURE — 85025 COMPLETE CBC W/AUTO DIFF WBC: CPT

## 2022-12-15 PROCEDURE — 80069 RENAL FUNCTION PANEL: CPT

## 2022-12-15 SDOH — ECONOMIC STABILITY: INCOME INSECURITY: IN THE LAST 12 MONTHS, WAS THERE A TIME WHEN YOU WERE NOT ABLE TO PAY THE MORTGAGE OR RENT ON TIME?: NO

## 2022-12-15 SDOH — ECONOMIC STABILITY: TRANSPORTATION INSECURITY
IN THE PAST 12 MONTHS, HAS LACK OF TRANSPORTATION KEPT YOU FROM MEETINGS, WORK, OR FROM GETTING THINGS NEEDED FOR DAILY LIVING?: NO

## 2022-12-15 SDOH — HEALTH STABILITY: PHYSICAL HEALTH: ON AVERAGE, HOW MANY MINUTES DO YOU ENGAGE IN EXERCISE AT THIS LEVEL?: 0 MIN

## 2022-12-15 SDOH — ECONOMIC STABILITY: HOUSING INSECURITY: IN THE LAST 12 MONTHS, HOW MANY PLACES HAVE YOU LIVED?: 1

## 2022-12-15 SDOH — ECONOMIC STABILITY: TRANSPORTATION INSECURITY
IN THE PAST 12 MONTHS, HAS THE LACK OF TRANSPORTATION KEPT YOU FROM MEDICAL APPOINTMENTS OR FROM GETTING MEDICATIONS?: NO

## 2022-12-15 SDOH — ECONOMIC STABILITY: FOOD INSECURITY: WITHIN THE PAST 12 MONTHS, YOU WORRIED THAT YOUR FOOD WOULD RUN OUT BEFORE YOU GOT MONEY TO BUY MORE.: NEVER TRUE

## 2022-12-15 SDOH — HEALTH STABILITY: PHYSICAL HEALTH: ON AVERAGE, HOW MANY DAYS PER WEEK DO YOU ENGAGE IN MODERATE TO STRENUOUS EXERCISE (LIKE A BRISK WALK)?: 0 DAYS

## 2022-12-15 SDOH — ECONOMIC STABILITY: FOOD INSECURITY: WITHIN THE PAST 12 MONTHS, THE FOOD YOU BOUGHT JUST DIDN'T LAST AND YOU DIDN'T HAVE MONEY TO GET MORE.: NEVER TRUE

## 2022-12-15 NOTE — TELEPHONE ENCOUNTER
I spoke with Ele Velasquez and she stated that [facility] is not [facility]. She stated that she can not ensure that patient took her medicine 1 hour prior to bp check when bp was 174/84 due to her  managing her medications and he was at a doctor's appointment. Her most recent blood pressures are as follows.     12/12/22: 145/95  12/13/22: 154/70  12/14/22: 143/90

## 2022-12-15 NOTE — CARE COORDINATION
Ambulatory Care Coordination Note  12/15/2022    ACC: Berta Cortez RN    [facility] completed follow up call with patient spouse Kendall Hall, who is verified on HIPPA form. Kendall Hall said he is doing well with having patient home. Kendall Hall said that patient is participating with [facility]. Kendall Hall has no other concerns at this time and will continue to monitor patients VS with RPM system. Kendall Hall thanked [facility] for calling. Plan:    F/U call 3 weeks      Offered patient enrollment in the Remote Patient Monitoring (RPM) program for in-home monitoring: Yes, patient enrolled. Lab Results       None            Care Coordination Interventions    Referral from Primary Care Provider: No  Suggested Interventions and [address] [place] [address] Hwy: In Process (Comment: [facility])  Senior Services: In Process (Comment: Interested in referral once [facility] is completed)  Zone Management Tools: In Process (Comment: DM, CHF, COPD)          Goals Addressed    None         Prior to Admission medications    Medication Sig Start Date End Date Taking? Authorizing Provider   acetylcysteine (MUCOMYST) 20 % nebulizer solution Inhale 3 mLs into the lungs in the morning and 3 mLs in the evening.  12/12/22   Jamie Mancia MD   formoterol (PERFOROMIST) 20 MCG/2ML nebulizer solution Take 2 mLs by nebulization every 12 hours Take 20 mcg by nebulization every 12 hours J44.9 12/8/22 1/7/23  MAHAD Lee CNP   predniSONE (DELTASONE) 10 MG tablet 30 mg x 3 days, 20 mg x 3 days, 10 mg x 3 days then stop 12/7/22   Barrington Surehs MD   scopolamine (TRANSDERM-SCOP) transdermal patch Place 1 patch onto the skin every 72 hours 12/2/22 1/1/23  MAHAD Lee CNP   cetirizine (ZYRTEC) 10 MG tablet 1 tablet by Per G Tube route daily 12/2/22 1/1/23  MAHAD Lee CNP   simethicone (MYLICON) 40 XD/6.6GR LIQD drops 0.6 mLs by Per G Tube route in the morning, at noon, and at bedtime  Patient not taking: Reported on 12/3/2022 12/2/22   MAHAD Hardy CNP   propafenone (RYTHMOL) 150 MG tablet 1 tablet by Per G Tube route every 8 hours 12/2/22   MAHAD Hardy CNP   montelukast (SINGULAIR) 10 MG tablet 1 tablet by Per G Tube route daily TAKE 1 TABLET EVERY DAY 12/2/22   MAHAD Hardy CNP   lamoTRIgine (LAMICTAL) 25 MG tablet 2 tablets by Per G Tube route 2 times daily 12/2/22   MAHAD Hardy CNP   furosemide (LASIX) 40 MG tablet 1 tablet by Per G Tube route daily 12/2/22   MAHAD Hardy CNP   ferrous sulfate (IRON 325) 325 (65 Fe) MG tablet 1 tablet by Per G Tube route daily (with breakfast) 12/2/22   MAHAD Hardy CNP   escitalopram (LEXAPRO) 10 MG tablet 1 tablet by Per G Tube route daily 12/2/22   MAHAD Hardy CNP   cloNIDine (CATAPRES) 0.1 MG tablet 1 tablet by Per G Tube route 2 times daily 12/2/22   MAHAD Hardy CNP   carvedilol (COREG) 12.5 MG tablet 1 tablet by Per G Tube route 2 times daily (with meals) 12/2/22   MAHAD Hardy CNP   carbidopa-levodopa (SINEMET)  MG per tablet 1 tablet by Per G Tube route 4 times daily 12/2/22   MAHAD Hardy CNP   atorvastatin (LIPITOR) 40 MG tablet 1.5 tablets by Per G Tube route daily TAKE 1 AND 1/2 TABLETS EVERY DAY 12/2/22   MAHAD Hardy CNP   apixaban (ELIQUIS) 2.5 MG TABS tablet 1 tablet by Per G Tube route 2 times daily 12/2/22   MAHAD Hardy CNP   amLODIPine (NORVASC) 5 MG tablet 1 tablet by Per G Tube route daily 12/2/22   ErrMAHAD Michael CNP   budesonide (PULMICORT) 0.25 MG/2ML nebulizer suspension Take 2 mLs by nebulization 2 times daily 12/2/22   MAHAD Hardy CNP   acetaminophen (TYLENOL) 325 MG tablet Take 650 mg by mouth every 6 hours as needed for Pain    Historical Provider, MD spearsaiFENesin (ROBITUSSIN) 100 MG/5ML SOLN oral solution Take 200 mg by mouth every 6 hours as needed for Cough    Historical Provider, MD palencia-docusate (Rose Rich) 8.6-50 MG per tablet Take 1 tablet by mouth in the morning and at bedtime  Patient not taking: Reported on 12/3/2022    Historical Provider, MD   valsartan (DIOVAN) 80 MG tablet Take 1 tablet by mouth daily 5/24/22 7/19/22  Jovi Howell MD   NIFEdipine (PROCARDIA XL) 90 MG extended release tablet TAKE 1 TABLET EVERY DAY 3/2/22 7/19/22  Octavia Nolan APRN - CNP   Multiple Vitamins-Minerals (THERAPEUTIC MULTIVITAMIN-MINERALS) tablet Take 1 tablet by mouth daily    Historical Provider, MD   solifenacin (VESICARE) 5 MG tablet Take 1 tablet by mouth daily. 6/25/13 10/29/13  Annie Schuster MD       Future Appointments   Date Time Provider Katy Booker   12/19/2022  4:00 PM SCHEDULE, [facility]  [place] [facility]   3/6/2023  3:30 PM Annie Schuster [facility]  [place] [facility]   ,   Diabetes Assessment    Medic Alert ID: No  Meal Planning: Avoidance of concentrated sweets   How often do you test your blood sugar?: Daily (Comment: -125)   Do you have barriers with adherence to non-pharmacologic self-management interventions?  (Nutrition/Exercise/Self-Monitoring): No   Have you ever had to go to the ED for symptoms of low blood sugar?: No            ,   Congestive Heart Failure Assessment    Are you currently restricting fluids?:  (Comment: 64 oz per day)  Do you understand a low sodium diet?: Yes  Do you understand how to read food labels?: Yes  How many restaurant meals do you eat per week?: 0  Do you salt your food before tasting it?: No         Symptoms:          ,   COPD Assessment    Does the patient understand envrionmental exposure?: Yes  Is the patient able to verbalize Rescue vs. Long Acting medications?: Yes  Does the patient have a nebulizer?: Yes  Does the patient use a space with inhaled medications?: Yes            Symptoms:          , and   General Assessment

## 2022-12-15 NOTE — TELEPHONE ENCOUNTER
Ideally if home RN could try again, but if unable to get will need pt to go to one of the [facility] for labs as last potassium was elevated, and hgb was low on last results on 12/7. I will place orders if need to go to hospital for draw.

## 2022-12-15 NOTE — CARE COORDINATION
Remote Patient Monitoring Note      Date/Time:  12/15/2022 12:42 PM    CCSS reviewed patients reported daily Remote Patient Monitoring metrics. All reported metrics are within alert parameters. Plan/Follow Up:  Will continue to review, monitor and address alerts with follow up based on severity of symptoms and risk factors  --- Current Patient Metrics ---- Blood Pressure: 128/54, 69bpm Glucose: 200mg/dl Pulseox: 95%, 68bpm Survey: - Note Created at: 12/15/2022 12:39 PM ET ---- Time-Spent: 2 minutes 0 seconds

## 2022-12-15 NOTE — DISCHARGE SUMMARY
Name:  Joe Hammans  Room:  0364/9303-38  MRN:    [id number]    IM Discharge Summary    Discharging Physician:  Jose Elias Young MD    Admit: 12/3/2022    Discharge:  12/7/2022    PCP      Ty Albrecht MD    Diagnoses and hospital course  this Admission      #Acute kidney injury on CKD stage IIIb  #Hyperkalemia  -creatinine of 2.3 on admission --> 1.6 today; baseline 1.3-1.7     -potassium of 7.1 on admission --> 4.8  >5.3 today after bicarb fluids and lokelma  -likely 2/2 ARB and dehydration  -held Lasix and Diovan  -nephrology consulted given IVF with HCO3  -resumed lasix now         #Acute asthma exacerbation  -tachypneic on admission with reported SOB and hypoxia  -covid/flu negative  -respiratory panel viral neg   -CXR WNL  -placed on Solu-Medrol and doxycycline  -inhaled bronchodilators  -BiPAP nightly but no daytime O2 supplementation at baseline  -maintaining O2 saturations >95% on 1.5 L O2 via NC, wean as tolerated   - improved overall and back to home level of o2      #Iron deficiency anemia  -Hgb 7.6 >7.4  with IVF  monitor  -trend H/H- ad transfuse if below 7     #Obstructive sleep apnea  -on BiPAP at night  -use as needed during the day     #Paroxysmal atrial fibrillation  -continue Rythmol and Eliquis- rate controlled      #Hypertension  -continue Norvasc Coreg and clonidine  -hold Diovan      #Carotid artery stenosis   -s/p CEA      #History of seizures  -continue Lamictal     #Morbid obesity  Weight loss helps        Recent prolonged resp failure needing tracheostomy and PEG placement  Trach removed and de cannulated, ongoing SLP eval , currently on TF     DVT Prophylaxis: eliquis  Diet: Diet NPO  ADULT TUBE FEEDING; PEG; Diabetic; Continuous; 55; No; 30; Q 4 hours  Code Status: Full Code    HPI 53-year-old female with a history of morbid obesity, asthma, obstructive sleep apnea on BiPAP at night, hypertension, Parkinson's disease, status post G-tube placement, presented to the emergency room with shortness of breath and hyperkalemia  Patient was having difficulty breathing for the past day or so on the  was thinking about bringing the patient to the emergency room. At the same time the primary care physician's office called to tell them that her potassium was very high. ER labs showed a potassium of 7.1. Treated medically. Continued on BiPAP given respiratory difficulty. Patient denies having any cough. No fever or chills. No chest pain. Denies nausea vomiting or diarrhea. Patient is allergic to levaquin [levofloxacin], metoclopramide, phenazopyridine, pyridium [phenazopyridine hcl], reglan [metoclopramide hcl], and reglan [metoclopramide hcl]. Physical Exam at Discharge:       General: elderly female up in bed,   Healed trach scar    Awake, alert and oriented. Appears to be not in any distress  Mucous Membranes:  Pink , anicteric  Left opaque eye  Neck: No JVD, no carotid bruit, no thyromegaly  Chest:  Clear to auscultation bilaterally, no added sounds  Cardiovascular:  RRR S1S2 heard, no murmurs or gallops  Abdomen:  Soft, obese, PEG noted undistended, non tender, no organomegaly, BS present  Extremities: No edema or cyanosis. Distal pulses well felt  Neurological : grossly normal with gen weakness            Radiology (Please Review Full Report for Details)        CULTURES  Covid/Flu negative   Respiratory panel pending       RADIOLOGY  XR CHEST PORTABLE   Final Result   Bronchial wall thickening, perhaps mildly increased, bronchitis or reactive   airway disease. No consolidation is identified. Consults:    1. SLP  2.  PT                   Recent Labs     12/15/22  1155   WBC 16.3*   HGB 7.7*   HCT 24.4*   MCV 89.2          Recent Labs     12/15/22  1155      K 5.9*   CL 99   CO2 32   PHOS 4.1   BUN 56*   CREATININE 1.4*       CBC:  Lab Results   Component Value Date/Time    WBC 16.3 12/15/2022 11:55 AM    HGB 7.7 12/15/2022 11:55 AM    HCT 24.4 12/15/2022 11:55 AM    MCV 89.2 12/15/2022 11:55 AM     12/15/2022 11:55 AM    NEUTOPHILPCT 91.1 12/15/2022 11:55 AM    LYMPHOPCT 4.3 12/15/2022 11:55 AM    MONOPCT 3.5 12/15/2022 11:55 AM    EOSPCT 1.3 09/23/2011 11:56 AM    BASOPCT 0.3 12/15/2022 11:55 AM    NEUTROABS 14.8 12/15/2022 11:55 AM    LYMPHSABS 0.7 12/15/2022 11:55 AM    MONOSABS 0.6 12/15/2022 11:55 AM    EOSABS 0.1 12/15/2022 11:55 AM    BASOSABS 0.0 12/15/2022 11:55 AM     BNP:   Lab Results   Component Value Date/Time     12/15/2022 11:55 AM    K 5.9 12/15/2022 11:55 AM    K 5.3 12/07/2022 06:00 AM    CO2 32 12/15/2022 11:55 AM    BUN 56 12/15/2022 11:55 AM    CREATININE 1.4 12/15/2022 11:55 AM    CALCIUM 8.1 12/15/2022 11:55 AM                Medication List        START taking these medications      predniSONE 10 MG tablet  Commonly known as: DELTASONE  30 mg x 3 days, 20 mg x 3 days, 10 mg x 3 days then stop            CONTINUE taking these medications      acetaminophen 325 MG tablet  Commonly known as: TYLENOL     amLODIPine 5 MG tablet  Commonly known as: NORVASC  1 tablet by Per G Tube route daily     apixaban 2.5 MG Tabs tablet  Commonly known as: Eliquis  1 tablet by Per G Tube route 2 times daily     atorvastatin 40 MG tablet  Commonly known as: LIPITOR  1.5 tablets by Per G Tube route daily TAKE 1 AND 1/2 TABLETS EVERY DAY     budesonide 0.25 MG/2ML nebulizer suspension  Commonly known as: PULMICORT  Take 2 mLs by nebulization 2 times daily     carbidopa-levodopa  MG per tablet  Commonly known as: SINEMET  1 tablet by Per G Tube route 4 times daily     carvedilol 12.5 MG tablet  Commonly known as: COREG  1 tablet by Per G Tube route 2 times daily (with meals)     cetirizine 10 MG tablet  Commonly known as: ZYRTEC  1 tablet by Per G Tube route daily     cloNIDine 0.1 MG tablet  Commonly known as: CATAPRES  1 tablet by Per G Tube route 2 times daily     escitalopram 10 MG tablet  Commonly known as: LEXAPRO  1 tablet by Per G Tube route daily     ferrous sulfate 325 (65 Fe) MG tablet  Commonly known as: IRON 325  1 tablet by Per G Tube route daily (with breakfast)     furosemide 40 MG tablet  Commonly known as: LASIX  1 tablet by Per G Tube route daily     guaiFENesin 100 MG/5ML Soln oral solution  Commonly known as: ROBITUSSIN     lamoTRIgine 25 MG tablet  Commonly known as: LAMICTAL  2 tablets by Per G Tube route 2 times daily     montelukast 10 MG tablet  Commonly known as: SINGULAIR  1 tablet by Per G Tube route daily TAKE 1 TABLET EVERY DAY     propafenone 150 MG tablet  Commonly known as: RYTHMOL  1 tablet by Per G Tube route every 8 hours     scopolamine transdermal patch  Commonly known as: TRANSDERM-SCOP  Place 1 patch onto the skin every 72 hours     therapeutic multivitamin-minerals tablet            STOP taking these medications      NIFEdipine 90 MG extended release tablet  Commonly known as: PROCARDIA XL     valsartan 80 MG tablet  Commonly known as: DIOVAN            ASK your doctor about these medications      doxycycline hyclate 100 MG tablet  Commonly known as: VIBRA-TABS  Take 1 tablet by mouth 2 times daily for 3 days  Ask about: Should I take this medication?     senna-docusate 8.6-50 MG per tablet  Commonly known as: PERICOLACE     simethicone 40 MG/0.6ML Liqd drops  Commonly known as: MYLICON  0.6 mLs by Per G Tube route in the morning, at noon, and at bedtime               Where to Get Your Medications        These medications were sent to [facility] [id number] [facility], [address] [place] [address], [address] [place] [address]      Phone: [phone]   doxycycline hyclate 100 MG tablet  predniSONE 10 MG tablet           Discharge Condition/Location: stable/home with home care     Follow Up:    PCP in 1 weeks      Time Spent on discharge is more than 28 minutes in the examination, evaluation, counseling and review of medications and discharge plan.       Jennifer Noonan Sandor Sánchez MD, 12/15/2022 1:44 PM

## 2022-12-15 NOTE — TELEPHONE ENCOUNTER
LVM for Purdy Cage home RN:    [facility] steroids at discharge and should be completing tomorrow, also losartan on hold due to hyperkalemia. No medication changes at this time. Continue to check bp if continued elevation >145/85 after discontinuation of steroids please let office know. If pt is symptomatic with elevated bp, please let office know immediately.  \"

## 2022-12-15 NOTE — TELEPHONE ENCOUNTER
Mignon Kwon informed.  She stated that she will discuss this with her manager on whether they will try the draw again or have her get these drawn at the hospital.

## 2022-12-15 NOTE — TELEPHONE ENCOUNTER
Looks like pt had been given steroids at discharge and should be completing tomorrow, also losartan on hold due to hyperkalemia. No medication changes at this time. Continue to check bp if continued elevation >145/85 after discontinuation of steroids please let office know. If pt is symptomatic with elevated bp, please let office know immediately.

## 2022-12-15 NOTE — TELEPHONE ENCOUNTER
Received phone call from [facility] with [facility] and she was able to get blood drawn on pt and is dropping this off at [facility] at this time currently will await results

## 2022-12-15 NOTE — PROGRESS NOTES
Physician Progress Note      Semaj Velásquez  CSN #:                  [id number]  :                       1940  ADMIT DATE:       12/3/2022 8:56 AM  DISCH DATE:        2022 5:24 PM  RESPONDING  PROVIDER #:        Betina Chavez MD          QUERY TEXT:    Pt admitted with [facility]ARHE[facility]. Noted documentation of Acute respiratory failure with   hypoxia per ED. If possible, please document in progress notes and discharge   summary:      The medical record reflects the following:  Risk Factors:  Acute respiratory failure with hypoxia noted in ED  Clinical Indicators: RR 22. ED- LUNGS:  Respirations mildly labored. Expiratory wheezes throughout. .  Fair air exchange. Speaking in full   sentences. Respiratory:   no cough, no wheezing  Treatment: BiPAP at night,  Solu-Medrol, doxycycline    Thank You Mary Ann Hollis RN, [email or web address] [email or web address]  Options provided:  -- Acute respiratory failure with hypoxia confirmed present on admission  -- Acute respiratory failure with hypoxia ruled out  -- Other - I will add my own diagnosis  -- Disagree - Not applicable / Not valid  -- Disagree - Clinically unable to determine / Unknown  -- Refer to Clinical Documentation Reviewer    PROVIDER RESPONSE TEXT:    The diagnosis of Acute respiratory failure with hypoxia was ruled out. Query created by: Anastasia Trivedi on 2022 1:25 PM      Electronically signed by:   Betina Chavez MD 12/15/2022 1:46 PM

## 2022-12-15 NOTE — TELEPHONE ENCOUNTER
Can we get updated BP from today, and previous bps to see if this is consistently running high or an isolated elevation? Ensure pt has had medication at least 1 hr prior to checking.

## 2022-12-16 ENCOUNTER — CARE COORDINATION (OUTPATIENT)
Dept: CARE COORDINATION | Age: 82
End: 2022-12-16

## 2022-12-16 DIAGNOSIS — E87.5 HYPERKALEMIA: Primary | ICD-10-CM

## 2022-12-16 NOTE — TELEPHONE ENCOUNTER
It looks like the CMP and renal panel are in the chart resulted from [facility]. It doesn't look like the other two active orders were done.

## 2022-12-16 NOTE — CARE COORDINATION
Remote Patient Monitoring Note      Date/Time:  12/16/2022 3:22 PM    CCSS reviewed patients reported daily Remote Patient Monitoring metrics. All reported metrics are within alert parameters. Plan/Follow Up:  Will continue to review, monitor and address alerts with follow up based on severity of symptoms and risk factors  ---- Current Patient Metrics ---- Blood Pressure: 170/85, 65bpm Glucose: 181mg/dl Pulseox: 96%, 62bpm Survey: - Note Created at: 12/16/2022 03:21 PM ET ---- Time-Spent: 2 minutes 0 seconds

## 2022-12-19 ENCOUNTER — TELEPHONE (OUTPATIENT)
Dept: OTHER | Facility: CLINIC | Age: 82
End: 2022-12-19

## 2022-12-19 ENCOUNTER — APPOINTMENT (OUTPATIENT)
Dept: GENERAL RADIOLOGY | Age: 82
End: 2022-12-19
Payer: MEDICARE

## 2022-12-19 ENCOUNTER — HOSPITAL ENCOUNTER (EMERGENCY)
Age: 82
Discharge: HOME | End: 2022-12-20
Attending: EMERGENCY MEDICINE
Payer: MEDICARE

## 2022-12-19 VITALS
BODY MASS INDEX: 38.57 KG/M2 | WEIGHT: 240 LBS | SYSTOLIC BLOOD PRESSURE: 106 MMHG | RESPIRATION RATE: 18 BRPM | OXYGEN SATURATION: 99 % | HEIGHT: 66 IN | DIASTOLIC BLOOD PRESSURE: 63 MMHG | HEART RATE: 73 BPM | TEMPERATURE: 97.7 F

## 2022-12-19 DIAGNOSIS — N30.00 ACUTE CYSTITIS WITHOUT HEMATURIA: Primary | ICD-10-CM

## 2022-12-19 DIAGNOSIS — E87.5 HYPERKALEMIA: Primary | ICD-10-CM

## 2022-12-19 DIAGNOSIS — D64.9 ACUTE ON CHRONIC ANEMIA: ICD-10-CM

## 2022-12-19 LAB
A/G RATIO: 1 (ref 1.1–2.2)
ABO/RH: NORMAL
ALBUMIN SERPL-MCNC: 2.8 G/DL (ref 3.4–5)
ALP BLD-CCNC: 90 U/L (ref 40–129)
ALT SERPL-CCNC: <5 U/L (ref 10–40)
ANION GAP SERPL CALCULATED.3IONS-SCNC: 2 MMOL/L (ref 3–16)
ANTIBODY SCREEN: NORMAL
APTT: 24.7 SEC (ref 23–34.3)
AST SERPL-CCNC: 11 U/L (ref 15–37)
BACTERIA: ABNORMAL /HPF
BASOPHILS ABSOLUTE: 0 K/UL (ref 0–0.2)
BASOPHILS RELATIVE PERCENT: 0.2 %
BILIRUB SERPL-MCNC: 0.4 MG/DL (ref 0–1)
BILIRUBIN URINE: NEGATIVE
BLOOD BANK DISPENSE STATUS: NORMAL
BLOOD BANK PRODUCT CODE: NORMAL
BLOOD, URINE: ABNORMAL
BPU ID: NORMAL
BUN BLDV-MCNC: 59 MG/DL (ref 7–20)
CALCIUM SERPL-MCNC: 8.3 MG/DL (ref 8.3–10.6)
CHLORIDE BLD-SCNC: 93 MMOL/L (ref 99–110)
CLARITY: ABNORMAL
CO2: 35 MMOL/L (ref 21–32)
COLOR: YELLOW
CREAT SERPL-MCNC: 1.4 MG/DL (ref 0.6–1.2)
DESCRIPTION BLOOD BANK: NORMAL
EKG ATRIAL RATE: 56 BPM
EKG DIAGNOSIS: NORMAL
EKG P AXIS: 12 DEGREES
EKG P-R INTERVAL: 154 MS
EKG Q-T INTERVAL: 436 MS
EKG QRS DURATION: 100 MS
EKG QTC CALCULATION (BAZETT): 420 MS
EKG R AXIS: 54 DEGREES
EKG T AXIS: 39 DEGREES
EKG VENTRICULAR RATE: 56 BPM
EOSINOPHILS ABSOLUTE: 0.1 K/UL (ref 0–0.6)
EOSINOPHILS RELATIVE PERCENT: 0.7 %
EPITHELIAL CELLS, UA: ABNORMAL /HPF (ref 0–5)
GFR SERPL CREATININE-BSD FRML MDRD: 38 ML/MIN/{1.73_M2}
GLUCOSE BLD-MCNC: 134 MG/DL (ref 70–99)
GLUCOSE URINE: NEGATIVE MG/DL
HCT VFR BLD CALC: 22.2 % (ref 36–48)
HCT VFR BLD CALC: 26.8 % (ref 36–48)
HEMOGLOBIN: 6.9 G/DL (ref 12–16)
HEMOGLOBIN: 8.8 G/DL (ref 12–16)
HYALINE CASTS: ABNORMAL /LPF (ref 0–2)
INFLUENZA A: NOT DETECTED
INFLUENZA B: NOT DETECTED
INR BLD: 1.3 (ref 0.87–1.14)
KETONES, URINE: NEGATIVE MG/DL
LACTIC ACID: 0.7 MMOL/L (ref 0.4–2)
LEUKOCYTE ESTERASE, URINE: ABNORMAL
LYMPHOCYTES ABSOLUTE: 0.4 K/UL (ref 1–5.1)
LYMPHOCYTES RELATIVE PERCENT: 2.2 %
MCH RBC QN AUTO: 27.8 PG (ref 26–34)
MCHC RBC AUTO-ENTMCNC: 31.1 G/DL (ref 31–36)
MCV RBC AUTO: 89.5 FL (ref 80–100)
MICROSCOPIC EXAMINATION: YES
MONOCYTES ABSOLUTE: 0.9 K/UL (ref 0–1.3)
MONOCYTES RELATIVE PERCENT: 5.8 %
MUCUS: ABNORMAL /LPF
NEUTROPHILS ABSOLUTE: 14.9 K/UL (ref 1.7–7.7)
NEUTROPHILS RELATIVE PERCENT: 91.1 %
NITRITE, URINE: POSITIVE
PDW BLD-RTO: 13.5 % (ref 12.4–15.4)
PH UA: 7 (ref 5–8)
PLATELET # BLD: 172 K/UL (ref 135–450)
PMV BLD AUTO: 9.7 FL (ref 5–10.5)
POTASSIUM REFLEX MAGNESIUM: 5.7 MMOL/L (ref 3.5–5.1)
PRO-BNP: 3693 PG/ML (ref 0–449)
PROTEIN UA: 30 MG/DL
PROTHROMBIN TIME: 16 SEC (ref 11.7–14.5)
RBC # BLD: 2.48 M/UL (ref 4–5.2)
RBC UA: ABNORMAL /HPF (ref 0–4)
SARS-COV-2 RNA, RT PCR: NOT DETECTED
SODIUM BLD-SCNC: 130 MMOL/L (ref 136–145)
SPECIFIC GRAVITY UA: 1.01 (ref 1–1.03)
TOTAL PROTEIN: 5.7 G/DL (ref 6.4–8.2)
TROPONIN: <0.01 NG/ML
URINE REFLEX TO CULTURE: YES
URINE TYPE: ABNORMAL
UROBILINOGEN, URINE: 0.2 E.U./DL
WBC # BLD: 16.3 K/UL (ref 4–11)
WBC UA: ABNORMAL /HPF (ref 0–5)

## 2022-12-19 PROCEDURE — 36430 TRANSFUSION BLD/BLD COMPNT: CPT

## 2022-12-19 PROCEDURE — 96375 TX/PRO/DX INJ NEW DRUG ADDON: CPT

## 2022-12-19 PROCEDURE — 83880 ASSAY OF NATRIURETIC PEPTIDE: CPT

## 2022-12-19 PROCEDURE — 81001 URINALYSIS AUTO W/SCOPE: CPT

## 2022-12-19 PROCEDURE — 93005 ELECTROCARDIOGRAM TRACING: CPT | Performed by: EMERGENCY MEDICINE

## 2022-12-19 PROCEDURE — 87636 SARSCOV2 & INF A&B AMP PRB: CPT

## 2022-12-19 PROCEDURE — 87186 SC STD MICRODIL/AGAR DIL: CPT

## 2022-12-19 PROCEDURE — 6360000002 HC RX W HCPCS: Performed by: EMERGENCY MEDICINE

## 2022-12-19 PROCEDURE — 85730 THROMBOPLASTIN TIME PARTIAL: CPT

## 2022-12-19 PROCEDURE — 96365 THER/PROPH/DIAG IV INF INIT: CPT

## 2022-12-19 PROCEDURE — 87086 URINE CULTURE/COLONY COUNT: CPT

## 2022-12-19 PROCEDURE — 71045 X-RAY EXAM CHEST 1 VIEW: CPT

## 2022-12-19 PROCEDURE — 86900 BLOOD TYPING SEROLOGIC ABO: CPT

## 2022-12-19 PROCEDURE — 85018 HEMOGLOBIN: CPT

## 2022-12-19 PROCEDURE — 93010 ELECTROCARDIOGRAM REPORT: CPT | Performed by: INTERNAL MEDICINE

## 2022-12-19 PROCEDURE — 85014 HEMATOCRIT: CPT

## 2022-12-19 PROCEDURE — 36415 COLL VENOUS BLD VENIPUNCTURE: CPT

## 2022-12-19 PROCEDURE — 86923 COMPATIBILITY TEST ELECTRIC: CPT

## 2022-12-19 PROCEDURE — 80053 COMPREHEN METABOLIC PANEL: CPT

## 2022-12-19 PROCEDURE — 99285 EMERGENCY DEPT VISIT HI MDM: CPT

## 2022-12-19 PROCEDURE — 96366 THER/PROPH/DIAG IV INF ADDON: CPT

## 2022-12-19 PROCEDURE — 87077 CULTURE AEROBIC IDENTIFY: CPT

## 2022-12-19 PROCEDURE — 85610 PROTHROMBIN TIME: CPT

## 2022-12-19 PROCEDURE — 83605 ASSAY OF LACTIC ACID: CPT

## 2022-12-19 PROCEDURE — 86850 RBC ANTIBODY SCREEN: CPT

## 2022-12-19 PROCEDURE — P9016 RBC LEUKOCYTES REDUCED: HCPCS

## 2022-12-19 PROCEDURE — 85025 COMPLETE CBC W/AUTO DIFF WBC: CPT

## 2022-12-19 PROCEDURE — 2580000003 HC RX 258: Performed by: EMERGENCY MEDICINE

## 2022-12-19 PROCEDURE — 86901 BLOOD TYPING SEROLOGIC RH(D): CPT

## 2022-12-19 PROCEDURE — 84484 ASSAY OF TROPONIN QUANT: CPT

## 2022-12-19 RX ORDER — CEPHALEXIN 500 MG/1
500 CAPSULE ORAL 4 TIMES DAILY
Qty: 28 CAPSULE | Refills: 0 | Status: SHIPPED | OUTPATIENT
Start: 2022-12-19 | End: 2022-12-26

## 2022-12-19 RX ORDER — SODIUM CHLORIDE 9 MG/ML
INJECTION, SOLUTION INTRAVENOUS PRN
Status: DISCONTINUED | OUTPATIENT
Start: 2022-12-19 | End: 2022-12-20 | Stop reason: HOSPADM

## 2022-12-19 RX ORDER — FUROSEMIDE 10 MG/ML
40 INJECTION INTRAMUSCULAR; INTRAVENOUS ONCE
Status: COMPLETED | OUTPATIENT
Start: 2022-12-19 | End: 2022-12-19

## 2022-12-19 RX ADMIN — FUROSEMIDE 40 MG: 10 INJECTION, SOLUTION INTRAMUSCULAR; INTRAVENOUS at 18:42

## 2022-12-19 RX ADMIN — CEFTRIAXONE SODIUM 1000 MG: 1 INJECTION, POWDER, FOR SOLUTION INTRAMUSCULAR; INTRAVENOUS at 16:07

## 2022-12-19 ASSESSMENT — PAIN - FUNCTIONAL ASSESSMENT: PAIN_FUNCTIONAL_ASSESSMENT: NONE - DENIES PAIN

## 2022-12-19 NOTE — ED NOTES
Per  patient potassium high from recent labs and PCP called today and told needed brought in to recheck     Araseli Moyer RN  12/19/22 [id number]

## 2022-12-19 NOTE — ED NOTES
Ashlyn Dunn sent to Dr. Chayito Carmona  12/19/22 [id number] Romeo S Sheila [facility] Second Perfect Serve sent to Dr. Chayito Carmona  12/19/22 [phone] [id number] consult completed with call back from Dr. Stacia Gregg speaking with Dr. Rene Barnett  12/19/22 [phone]

## 2022-12-19 NOTE — CARE COORDINATION
[facility]  Patient is active with [facility], Will follow for discharge to home with orders to resume care.     Berta Parekh RN, BSN CTN  [facility] [phone]

## 2022-12-19 NOTE — TELEPHONE ENCOUNTER
Writer contacted Dr Angle Ramírez to inform of 30 day readmission risk. Writer's attempt to contact ED provider was unsuccessful.     Call Back: If you need to call back to inform of disposition you can contact me at [phone]

## 2022-12-19 NOTE — ED NOTES
Critical lab value received from lab: hemoglobin 6.9. Provider notified.      Paul Nolasco RN  12/19/22 [id number]

## 2022-12-19 NOTE — ED PROVIDER NOTES
[facility])     Baker's cyst     Basal cell carcinoma of left cheek 3/12/2019    Blind left eye     Carotid artery stenoses     Carotid stenosis 2002    left    Cervical spinal cord compression ([address] 11/2010    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-sided sciatica 8/9/2016    CKD (chronic kidney disease) stage 3, GFR 30-59 ml/min ([facility])     Community acquired pneumonia of left lower lobe of lung 9/8/2018    CRI     Detached retina, left     Diverticulosis     DJD (degenerative joint disease)     Edema     chronic    Fatty liver     GERD (gastroesophageal reflux disease)     Hearing loss     Herniated lumbar intervertebral disc     Hx stage 2 sacral decubitus ulcer 4/13/2018    Hyperlipidemia     Hypertension     Hypothyroid 1/27/2015    Morbid obesity with BMI of 40.0-44.9, adult ([address] 2/10/2017    BETSY treated with BiPAP     Parkinson's disease ([address]     Partial symptomatic epilepsy with complex partial seizures, not intractable, without status epilepticus ([address] 1/24/2020    Restless leg syndrome     Restless legs syndrome     Rheumatic fever 1950    2x IN [place]    Sleep apnea     Tremor, essential 8/16/2010    Type 2 diabetes mellitus without complication ([address]     Type II or unspecified type diabetes mellitus without mention of complication, not stated as uncontrolled          SURGICAL HISTORY       Past Surgical History:   Procedure Laterality Date    APPENDECTOMY      BRONCHOSCOPY N/A 7/7/2022    BRONCHOSCOPY ALVEOLAR LAVAGE performed by Mauricio Loredo MD at [facility]  7/7/2022    BRONCHOSCOPY THERAPUTIC ASPIRATION INITIAL performed by Mauricio Loredo MD at [facility]      left    CATARACT REMOVAL      CERVICAL FUSION  11/2010    CHOLECYSTECTOMY      COLONOSCOPY N/A 3/8/2020    COLONOSCOPY FLEXIBLE W/ DECOMPRESSION performed by Elaine Romero MD at [address] [facility] [address]  11/8/2022 GASTROSTOMY/PEG TUBE CHANGE performed by Connor Marie DO at [address] (CERVIX STATUS UNKNOWN)      IR MIDLINE CATH  10/8/2021    IR MIDLINE CATH 10/8/2021 [facility] SPECIAL PROCEDURES    JOINT REPLACEMENT Bilateral 2003    KNEE SURGERY      replacement x 2    OVARY REMOVAL      SHOULDER SURGERY      TONSILLECTOMY      TRACHEOSTOMY N/A 7/15/2022    TRACHEOTOMY WITH PERCUTANEOUS ENDOSCOPIC GASTROSTOMY TUBE PLACEMENT performed by Cleo Benson MD at [address]  03/17/2020    Esophagitis    UPPER GASTROINTESTINAL ENDOSCOPY N/A 3/17/2020    EGD performed by Yanira Chen MD at [place] [address]  11/8/2022    EGD ESOPHAGOGASTRODUODENOSCOPY performed by Connor Marie DO at [facility]       Discharge Medication List as of 12/20/2022 12:05 AM        CONTINUE these medications which have NOT CHANGED    Details   acetylcysteine (MUCOMYST) 20 % nebulizer solution Inhale 3 mLs into the lungs in the morning and 3 mLs in the evening., Disp-30 mL, R-5Normal      formoterol (PERFOROMIST) 20 MCG/2ML nebulizer solution Take 2 mLs by nebulization every 12 hours Take 20 mcg by nebulization every 12 hours J44.9, Disp-120 mL, R-0Normal      predniSONE (DELTASONE) 10 MG tablet 30 mg x 3 days, 20 mg x 3 days, 10 mg x 3 days then stop, Disp-18 tablet, R-0Normal      scopolamine (TRANSDERM-SCOP) transdermal patch Place 1 patch onto the skin every 72 hours, Disp-10 patch, R-0Normal      cetirizine (ZYRTEC) 10 MG tablet 1 tablet by Per G Tube route daily, Disp-90 tablet, R-2Via G tubeNormal      propafenone (RYTHMOL) 150 MG tablet 1 tablet by Per G Tube route every 8 hours, Disp-270 tablet, R-3Via g-tubeNormal      montelukast (SINGULAIR) 10 MG tablet 1 tablet by Per G Tube route daily TAKE 1 TABLET EVERY DAY, Disp-90 tablet, R-1Via g-tubeNormal      lamoTRIgine (LAMICTAL) 25 MG tablet 2 tablets by Per G Tube route 2 times daily, Disp-30 tablet, R-2Via g-tubeNormal      furosemide (LASIX) 40 MG tablet 1 tablet by Per G Tube route daily, Disp-90 tablet, R-1Via g-tubeNormal      ferrous sulfate (IRON 325) 325 (65 Fe) MG tablet 1 tablet by Per G Tube route daily (with breakfast), Disp-90 tablet, R-1Via g-tubeNormal      escitalopram (LEXAPRO) 10 MG tablet 1 tablet by Per G Tube route daily, Disp-90 tablet, R-2Via g-tubeNormal      cloNIDine (CATAPRES) 0.1 MG tablet 1 tablet by Per G Tube route 2 times daily, Disp-180 tablet, R-2Via g-tubeNormal      carvedilol (COREG) 12.5 MG tablet 1 tablet by Per G Tube route 2 times daily (with meals), Disp-60 tablet, R-2Via g-tubeNormal      carbidopa-levodopa (SINEMET)  MG per tablet 1 tablet by Per G Tube route 4 times daily, Disp-90 tablet, R-1Via g-tubeNormal      atorvastatin (LIPITOR) 40 MG tablet 1.5 tablets by Per G Tube route daily TAKE 1 AND 1/2 TABLETS EVERY DAY, Disp-135 tablet, R-3Via g-tubeNormal      apixaban (ELIQUIS) 2.5 MG TABS tablet 1 tablet by Per G Tube route 2 times daily, Disp-180 tablet, R-3Via g-tubeNormal      amLODIPine (NORVASC) 5 MG tablet 1 tablet by Per G Tube route daily, Disp-90 tablet, R-2Via g-tubeNormal      budesonide (PULMICORT) 0.25 MG/2ML nebulizer suspension Take 2 mLs by nebulization 2 times daily, Disp-60 each, R-2Normal      acetaminophen (TYLENOL) 325 MG tablet Take 650 mg by mouth every 6 hours as needed for PainHistorical Med      guaiFENesin (ROBITUSSIN) 100 MG/5ML SOLN oral solution Take 200 mg by mouth every 6 hours as needed for CoughHistorical Med      Multiple Vitamins-Minerals (THERAPEUTIC MULTIVITAMIN-MINERALS) tablet Take 1 tablet by mouth dailyHistorical Med             ALLERGIES     Levaquin [levofloxacin], Metoclopramide, Phenazopyridine, Pyridium [phenazopyridine hcl], Reglan [metoclopramide hcl], and Reglan [metoclopramide hcl]    FAMILY HISTORY       Family History   Problem Relation Age of Onset    Diabetes Mother     Heart Disease Mother     Diabetes Father     Heart Disease Father     Diabetes Sister           SOCIAL HISTORY       Social History     Socioeconomic History    Marital status:      Spouse name: Anny    Number of children: 2    Years of education: 12    Highest education level: None   Tobacco Use    Smoking status: Former    Smokeless tobacco: Never   Vaping Use    Vaping Use: Never used   Substance and Sexual Activity    Alcohol use: No    Drug use: No    Sexual activity: Not Currently   Social History Narrative    ** Merged History Encounter **          Social Determinants of Health     Food Insecurity: No Food Insecurity    Worried About Running Out of Food in the Last Year: Never true    Ran Out of Food in the Last Year: Never true   Transportation Needs: No Transportation Needs    Lack of Transportation (Medical): No    Lack of Transportation (Non-Medical): No   Physical Activity: Inactive    Days of Exercise per Week: 0 days    Minutes of Exercise per Session: 0 min   Housing Stability: Low Risk     Unable to Pay for Housing in the Last Year: No    Number of Places Lived in the Last Year: 1    Unstable Housing in the Last Year: No       SCREENINGS    New Coma Scale  Eye Opening: Spontaneous  Best Verbal Response: Oriented  Best Motor Response: Obeys commands  Oxford Coma Scale Score: 15          PHYSICAL EXAM    (up to 7 for level 4, 8 or more for level 5)     ED Triage Vitals [12/19/22 1045]   BP Temp Temp Source Heart Rate Resp SpO2 Height Weight   (!) 130/40 98.1 °F (36.7 °C) Oral 57 30 96 % 5' 6\" (1.676 m) 240 lb (108.9 kg)       Physical Exam    General appearance:  Cooperative. Chronically ill-appearing but otherwise in no acute distress. Skin:  Warm. Dry. Eye:  Extraocular movements intact. Left eye cataract  Ears, nose, mouth and throat:  Oral mucosa moist,  Neck:  Trachea midline. Heart:  Regular rate and rhythm  Perfusion:  intact  Respiratory: Occasional cough. Mild end expiratory wheezing bilaterally  Abdominal:   Non distended. Nontender  Neurological:  Alert and oriented x 3. Moves all extremities spontaneously  Musculoskeletal:   Normal ROM, no deformities.   Bilateral lower and upper extremity trace to 1+ pitting edema          Psychiatric:  Normal mood      DIAGNOSTIC RESULTS       Labs Reviewed   CBC WITH AUTO DIFFERENTIAL - Abnormal; Notable for the following components:       Result Value    WBC 16.3 (*)     RBC 2.48 (*)     Hemoglobin 6.9 (*)     Hematocrit 22.2 (*)     Neutrophils Absolute 14.9 (*)     Lymphocytes Absolute 0.4 (*)     All other components within normal limits    Narrative:     CALL  Rao  [facility] tel. [phone],  Hematology results called to and read back by LAKIA Sims Cap, 12/19/2022  12:13, by [facility]   COMPREHENSIVE METABOLIC PANEL W/ REFLEX TO MG FOR LOW K - Abnormal; Notable for the following components:    Sodium 130 (*)     Potassium reflex Magnesium 5.7 (*)     Chloride 93 (*)     CO2 35 (*)     Anion Gap 2 (*)     Glucose 134 (*)     BUN 59 (*)     Creatinine 1.4 (*)     Est, Glom Filt Rate 38 (*)     Total Protein 5.7 (*)     Albumin 2.8 (*)     Albumin/Globulin Ratio 1.0 (*)     ALT <5 (*)     AST 11 (*)     All other components within normal limits   PROTIME-INR - Abnormal; Notable for the following components:    Protime 16.0 (*)     INR 1.30 (*)     All other components within normal limits   BRAIN NATRIURETIC PEPTIDE - Abnormal; Notable for the following components:    Pro-BNP 3,693 (*)     All other components within normal limits   URINALYSIS WITH REFLEX TO CULTURE - Abnormal; Notable for the following components:    Clarity, UA SL CLOUDY (*)     Blood, Urine TRACE-INTACT (*)     Protein, UA 30 (*)     Nitrite, Urine POSITIVE (*)     Leukocyte Esterase, Urine LARGE (*)     All other components within normal limits   MICROSCOPIC URINALYSIS - Abnormal; Notable for the following components:    Mucus, UA 1+ (*)     WBC, UA 21-50 (*)     Bacteria, UA 3+ (*)     All other components within normal limits   HEMOGLOBIN AND HEMATOCRIT - Abnormal; Notable for the following components:    Hemoglobin 8.8 (*)     Hematocrit 26.8 (*)     All other components within normal limits   COVID-19 & INFLUENZA COMBO   CULTURE, URINE   LACTIC ACID   APTT   TROPONIN   TYPE AND SCREEN   PREPARE RBC (CROSSMATCH)       Interpretation per the Radiologist below, if obtained/available at the time of this note:    XR CHEST PORTABLE   Final Result   Spectrum of findings would favor mild pulmonary edema. All other labs/imaging were within normal range or not returned as of this dictation. EMERGENCY DEPARTMENT COURSE and DIFFERENTIAL DIAGNOSIS/MDM:   Vitals:    Vitals:    12/19/22 2001 12/19/22 2059 12/19/22 2302 12/19/22 2351   BP: (!) 129/93 124/60 (!) 118/108 106/63   Pulse: 66 70 69 73   Resp:       Temp:  97.7 °F (36.5 °C)     TempSrc:  Oral     SpO2: 99% 99% 100% 99%   Weight:       Height:           EKG *Interpreted by me*: Sinus bradycardia rate of 56 bpm.  Otherwise normal EKG    Patient presented to the emergency department today after being found have slightly elevated potassium as an outpatient and low hemoglobin. Sent to the hospital.  On nasal cannula oxygen at baseline and has no worsening shortness of breath though was given a bronchodilator treatment here. Laboratory work-up initiated and the patient was found to have acute on chronic anemia with a hemoglobin of 6.9. Instructions to transfuse below 7 found in recent discharge summary and she was transfused 1 unit PRBCs here. Thought to be chronic in nature and low suspicion for acute GI loss at this time. Additionally, was found to have an elevated white blood cell count though consistent with her most recent upon discharge. Evidence of UTI. At her mental baseline. Was given Rocephin here. Tolerated this well. Chest x-ray with mild pulmonary edema and BNP is elevated. Slight elevated potassium but no EKG changes. Was given Lasix from fluid overload to help improve with this mild hyperkalemia and her increased fluid retention. Discussed the case with the patient's  and the hospitalist regarding possible need for admission. Patient with multiple admissions to hospital.  Today, has very mild acute exacerbations of multiple chronic underlying comorbidities. Hospitalist preferred trial of discharge and outpatient management with outpatient antibiotics. She was given a dose of Lasix here. Received a unit of blood. Should her level on repeat to be above-cited plan to discharge home. We will continue to have outpatient close follow-up with her PCP. Discussed with the  who is in agreement and comfortable with taking the patient home. Will be given a prescription for Keflex. Tracey Senior M.D., am the primary clinician of record. MDM      CONSULTS     IP CONSULT TO HOSPITALIST    Critical Care:     CRITICAL CARE TIME    I personally saw the patient and independently provided 30 minutes of non-concurrent critical care out of the total shared critical care time provided, excluding separately reportable procedures. There was a high probability of clinically significant/life threatening deterioration in the patient's condition which required my urgent intervention. This time was spent reviewing the patient chart, interpreting diagnostic/laboratory data, transfusion of blood products due to acute on chronic anemia      REASSESSMENT          PROCEDURE     Unless otherwise noted below, none     Procedures      FINAL IMPRESSION      1. Acute cystitis without hematuria    2.  Acute on chronic anemia            DISPOSITION/PLAN   DISPOSITION Decision To Discharge 12/19/2022 11:49:20 PM        PATIENT REFERRED TO:  MD Tawanda Obando [facility] [address]  [place] [address]  [phone]    Schedule an appointment as soon as possible for a visit       DISCHARGE MEDICATIONS:  Discharge Medication List as of 12/20/2022 12:05 AM        START taking these medications    Details   cephALEXin (KEFLEX) 500 MG capsule Take 1 capsule by mouth 4 times daily for 7 days, Disp-28 capsule, R-0Normal           Controlled Substances Monitoring:     RX Monitoring 2/19/2019   Attestation The Prescription Monitoring Report for this patient was reviewed today. Periodic Controlled Substance Monitoring No signs of potential drug abuse or diversion identified.        (Please note that portions of this note were completed with a voice recognition program.  Efforts were made to edit the dictations but occasionally words are mis-transcribed.)    Juan Seals MD (electronically signed)  Attending Emergency Physician            Amira Wilkins MD  12/20/22 1016

## 2022-12-20 ENCOUNTER — CARE COORDINATION (OUTPATIENT)
Dept: CARE COORDINATION | Age: 82
End: 2022-12-20

## 2022-12-20 NOTE — CARE COORDINATION
[facility] ED Follow up Call    Reason for ED visit:  Acute Cystitis/ SOB  Status:     improved    Did you call your PCP prior to going to the ED? No      Did you receive a discharge instructions from the Emergency Room? Yes  Review of Instructions:     Understands what to report/when to return?:  Yes   Understands discharge instructions?:  Yes   Following discharge instructions?:  Yes   If not why? N/A    Are there any new complaints of pain? No  New Pain Meds? No    Constipation prophylaxis needed? N/A    If you have a wound is the dressing clean, dry, and intact? N/A  Understands wound care regimen? N/A    Are there any other complaints/concerns that you wish to tell your provider? ACM spoke with Carina Smith, patient spouse. Carina Smith said he is giving patient a nebulizer treatment right now with oxygen readings at 93%. Carina Smith said patient is doing well after getting 1 unit of blood without any complaints. Carina Smith said that he will be contacting [place] [address] today to see if ST will be starting. If ST is not going to start with [place] [address] would like referral for outpatient. ACM said she would touch base with Carina Smith in 1 week to discuss referral needs. Carina Smith has no other concerns at this time. FU appts/Provider:    Future Appointments   Date Time Provider Katy Booker   3/6/2023  3:30 PM MD [facility]  [place]lor[place] - ARIELLA           New Medications?:   Yes      Medication Reconciliation by phone - Yes  Understands Medications? Yes  Taking Medications? Yes  Can you swallow your pills? Has feeding tube    Any further needs in the home i.e. Equipment? No    Link to services in community?:  Yes   Which services:  ST referral if [place] [address] does not have ST to come to patient.

## 2022-12-21 ENCOUNTER — CARE COORDINATION (OUTPATIENT)
Dept: CASE MANAGEMENT | Age: 82
End: 2022-12-21

## 2022-12-21 LAB
ORGANISM: ABNORMAL
URINE CULTURE, ROUTINE: ABNORMAL

## 2022-12-21 NOTE — CARE COORDINATION
Remote Alert Monitoring Note      Date/Time:  2022 2:08 PM    LPN contacted family by telephone regarding yellow alert received because pt has not entered metrics in four days. Verified patients name and  as identifiers. Background: RPM for KD, DM, HTN, CHF Refer to 911 immediately if:  Patient unresponsive or unable to provide history  Change in cognition or sudden confusion  Patient unable to respond in complete sentences  Intense chest pain/tightness  Any concern for any clinical emergency  Red Alert: Provider response time of 1 hr required for any red alert requiring intervention  Yellow Alert: Provider response time of 3hr required for any escalated yellow alert Clinical Interventions: Reviewed and followed up on alerts and treatments-LPN had to contact  because metrics had not been entered in four days.  agreed to enter and entered metrics and all metrics are WNL. Plan/Follow Up: Will continue to review, monitor and address alerts with follow up based on severity of symptoms and risk factors.  Dee Vance LPN, [facility] PH: [phone] ---- Current Patient Metrics ---- Blood Pressure: 133/60, 62bpm Glucose: 153mg/dl Pulseox: 93%, 63bpm Survey: - Note Created at: 2022 02:10 PM ET ---- Time-Spent: 10 minutes 0 seconds

## 2022-12-22 ENCOUNTER — CARE COORDINATION (OUTPATIENT)
Dept: CARE COORDINATION | Age: 82
End: 2022-12-22

## 2022-12-22 ENCOUNTER — TELEPHONE (OUTPATIENT)
Dept: FAMILY MEDICINE CLINIC | Age: 82
End: 2022-12-22

## 2022-12-22 NOTE — CARE COORDINATION
Remote Alert Monitoring Note      Date/Time:  2022 9:21 AM     Current Patient Metrics ---- Blood Pressure: 121/59, 71bpm Glucose: 197mg/dl Pulseox: 88%, 70bpm Survey: - Note Created at: 2022 09:28 AM ET ---- Time-Spent: 10 minutes 0 seconds    LPN contacted  by telephone regarding red alert received for pulse ox reading (88%). Verified patients name and  as identifiers. Background: Kidney Disease, High Blood-Pressure, COPD, Diabetes  Refer to 911 immediately if:  Patient unresponsive or unable to provide history  Change in cognition or sudden confusion  Patient unable to respond in complete sentences  Intense chest pain/tightness  Any concern for any clinical emergency  Red Alert: Provider response time of 1 hr required for any red alert requiring intervention  Yellow Alert: Provider response time of 3hr required for any escalated yellow alert    O2 Triage  Are you having any Chest Pain? no   Are you having any Shortness of Breath? baseline   Swelling in your hands or feet? Right hand swelling     Are you having any other health concerns or issues? no     Pulse Ox Triage  Were your hands cold when you used your pulse oximeter today? no   Are your hands typically cold? no   Have tried warming your hands and rechecking your SpO2? (rubbing hands together, running them under warm water) NA   Did you complete an activity (ambulation, make bed, cook meal, shower) before checking your SpO2 today? no   Do you use oxygen? no   Was your oxygen on when you took today's reading? NA   Do you have nail polish on? no       Clinical Interventions: Reviewed and followed up on alerts and treatments-.   denies any change in condition, stating she does have some SOB at baseline. She does have some right hand swelling from possibly laying on it in bed .  did recheck SP02 while on the phone and he reports 91%. No escalation needed at this time. Plan/Follow Up:  Will continue to review, monitor and address alerts with follow up based on severity of symptoms and risk factors.        Freddie Fernandez, M[place]  [facility]/ Care Transition Nurse/[facility]  [phone]

## 2022-12-22 NOTE — TELEPHONE ENCOUNTER
Please use intermittently breathing treatment, if no improvement in wheezing or Spo2 levels let office know immediately. Please verify how many liters of O2 she is on. SpO2 goal is >92%, if sustaining <90% needs to go to ER. Recent vital signs in hospital showed SpO2 >95% on 2-3L. Can more information be given about arm swelling? Is it red, warm, tender? Has this happened before? Any open areas? Etc. If she could be scheduled for virtual visit, please do so. [facility] for verbal orders to cleanse with normal saline, and zinc oxide. Can use mepilex as well, ideally no ointment under mepilex. Ensure repositioning every 2 hours.

## 2022-12-22 NOTE — TELEPHONE ENCOUNTER
Spoke to Dejon Bishop. Patient is on 2.5 L oxygen, vitals were taken while repositioning and laying flat,  reported O2 95% at home when at rest sitting up. Denies redness, warm, or tender on arm swelling, +3 on swelling on left arm reports indent left behind from her finger when repositioning the patient, worse since last time labs drawn, taking 40mg lasix daily. Verbal orders for bedsore treatment given. I can contact  tomorrow morning for update and virtual if needed.

## 2022-12-22 NOTE — TELEPHONE ENCOUNTER
[facility]rera Jordan [facility] reporting patients right arm is more swollen than normal. Patient is wheezing in the upper lobe of the lungs bilaterally. Patient went to the Ed on 12/19/2022 for SOB and patient is on continuous oxygen now and is still stating at 89-90% on oxygen. Patient has sores on her butt. Herrera Jordan would like orders to clean with normal saline and patch with zinc and nepilex.  Please advise and call stephan back on her secure line at [phone]

## 2022-12-23 NOTE — TELEPHONE ENCOUNTER
Called and left a message I went ahead and just told them to call me back on my cell so that I can get them scheduled and Ill try to call them back again later as well.

## 2022-12-27 ENCOUNTER — CARE COORDINATION (OUTPATIENT)
Dept: CASE MANAGEMENT | Age: 82
End: 2022-12-27

## 2022-12-27 ENCOUNTER — TELEPHONE (OUTPATIENT)
Dept: FAMILY MEDICINE CLINIC | Age: 82
End: 2022-12-27

## 2022-12-27 ENCOUNTER — CARE COORDINATION (OUTPATIENT)
Dept: CARE COORDINATION | Age: 82
End: 2022-12-27

## 2022-12-27 DIAGNOSIS — I10 ESSENTIAL HYPERTENSION: ICD-10-CM

## 2022-12-27 DIAGNOSIS — E11.22 TYPE 2 DIABETES MELLITUS WITH STAGE 4 CHRONIC KIDNEY DISEASE, WITHOUT LONG-TERM CURRENT USE OF INSULIN (HCC): ICD-10-CM

## 2022-12-27 DIAGNOSIS — J44.9 CHRONIC OBSTRUCTIVE PULMONARY DISEASE, UNSPECIFIED COPD TYPE (HCC): Primary | ICD-10-CM

## 2022-12-27 DIAGNOSIS — N18.4 TYPE 2 DIABETES MELLITUS WITH STAGE 4 CHRONIC KIDNEY DISEASE, WITHOUT LONG-TERM CURRENT USE OF INSULIN (HCC): ICD-10-CM

## 2022-12-27 NOTE — TELEPHONE ENCOUNTER
Spoke to [facility] and informed we will sign death certificate. Dr. Fidel Pedraza said he will sign.

## 2022-12-27 NOTE — TELEPHONE ENCOUNTER
[address] [facility] called in to see if a doctor will sign death certificate.  Please advise and call back at [phone] please ask for jace

## 2022-12-27 NOTE — CARE COORDINATION
RPM return request has been submitted. Call to patient Lisa to inform of [employer]  & to pack equipment in original box, verbalized understanding. Asked if he could get a call with tracking. Will update and call patient Lisa with [employer] tracking once received.

## 2022-12-27 NOTE — PROGRESS NOTES
12/27/22 12:11 PM    Remote Patient Order Discontinued    Received request from Beltran Garg LPN to discontinue order for remote patient monitoring of Kidney Disease , COPD, Diabetes, and HTN and order completed.      Linnea Franklin DNP, FNP-C, Remote Patient Monitoring NP, (ph) [phone]

## 2022-12-28 RX ORDER — BUDESONIDE 0.25 MG/2ML
INHALANT ORAL
Qty: 120 ML | Refills: 2 | OUTPATIENT
Start: 2022-12-28

## 2023-01-01 NOTE — PROGRESS NOTES
RT Inhaler-Nebulizer Bronchodilator Protocol Note    There is a bronchodilator order in the chart from a provider indicating to follow the RT Bronchodilator Protocol and there is an Initiate RT Inhaler-Nebulizer Bronchodilator Protocol order as well (see protocol at bottom of note). CXR Findings:  XR CHEST PORTABLE    Result Date: 6/27/2022  No significant interval change in bilateral airspace disease and small effusions. Unchanged positioning of support devices. The findings from the last RT Protocol Assessment were as follows:   History Pulmonary Disease: Chronic pulmonary disease  Respiratory Pattern: Mild dyspnea at rest, irregular pattern, or RR 21-25 bpm  Breath Sounds: Slightly diminished and/or crackles  Cough: Weak, productive  Indication for Bronchodilator Therapy: On home bronchodilators  Bronchodilator Assessment Score: 10    Aerosolized bronchodilator medication orders have been revised according to the RT Inhaler-Nebulizer Bronchodilator Protocol below. Respiratory Therapist to perform RT Therapy Protocol Assessment initially then follow the protocol. Repeat RT Therapy Protocol Assessment PRN for score 0-3 or on second treatment, BID, and PRN for scores above 3. No Indications - adjust the frequency to every 6 hours PRN wheezing or bronchospasm, if no treatments needed after 48 hours then discontinue using Per Protocol order mode. If indication present, adjust the RT bronchodilator orders based on the Bronchodilator Assessment Score as indicated below. Use Inhaler orders unless patient has one or more of the following: on home nebulizer, not able to hold breath for 10 seconds, is not alert and oriented, cannot activate and use MDI correctly, or respiratory rate 25 breaths per minute or more, then use the equivalent nebulizer order(s) with same Frequency and PRN reasons based on the score.   If a patient is on this medication at home then do not decrease Frequency below that used at home. 0-3 - enter or revise RT bronchodilator order(s) to equivalent RT Bronchodilator order with Frequency of every 4 hours PRN for wheezing or increased work of breathing using Per Protocol order mode. 4-6 - enter or revise RT Bronchodilator order(s) to two equivalent RT bronchodilator orders with one order with BID Frequency and one order with Frequency of every 4 hours PRN wheezing or increased work of breathing using Per Protocol order mode. 7-10 - enter or revise RT Bronchodilator order(s) to two equivalent RT bronchodilator orders with one order with TID Frequency and one order with Frequency of every 4 hours PRN wheezing or increased work of breathing using Per Protocol order mode. 11-13 - enter or revise RT Bronchodilator order(s) to one equivalent RT bronchodilator order with QID Frequency and an Albuterol order with Frequency of every 4 hours PRN wheezing or increased work of breathing using Per Protocol order mode. Greater than 13 - enter or revise RT Bronchodilator order(s) to one equivalent RT bronchodilator order with every 4 hours Frequency and an Albuterol order with Frequency of every 2 hours PRN wheezing or increased work of breathing using Per Protocol order mode. PATIENT WILL BENEFIT FROM TREATMENTS.      Electronically signed by Bertram Treadwell RCP on 6/28/2022 at 7:17 PM [id number]

## 2023-05-18 NOTE — PATIENT INSTRUCTIONS

## 2023-05-28 NOTE — PROGRESS NOTES
Progress Note    Admit Date:  2/29/2020    Subjective:  Ms. Lennox Amy looks improved. Abd less distended  Reports she has loose stools when she stood up     Low grade fever this am      Objective:   Patient Vitals for the past 4 hrs:   BP Temp Temp src Pulse Resp SpO2   03/15/20 1048 -- -- -- -- -- 98 %   03/15/20 1009 -- 97.1 °F (36.2 °C) Oral -- -- --   03/15/20 0830 (!) 126/90 100.2 °F (37.9 °C) Axillary 97 22 95 %        Intake/Output Summary (Last 24 hours) at 3/15/2020 1139  Last data filed at 3/15/2020 0915  Gross per 24 hour   Intake 480 ml   Output 500 ml   Net -20 ml     Physical Exam:        General:elderly female  Awake, alert and oriented. Appears to be not in any distress  Mucous Membranes:  Pink , anicteric  Neck: No JVD, no carotid bruit, no thyromegaly  Chest:  Clear to auscultation bilaterally, no added sounds  Right Lower rib cage pain   Cardiovascular:  RRR S1S2 heard, no murmurs or gallops  Abdomen:  Soft, obese,  slightly improved distention, non tender, no organomegaly, BS ++  Extremities: No edema or cyanosis.  Distal pulses well felt  Left UE subcut edema resolving  Neurological : grossly normal    Scheduled Meds:   carbidopa-levodopa  1 tablet Oral 4x Daily    sennosides-docusate sodium  2 tablet Oral Daily    linaclotide  145 mcg Oral QAM AC    bisacodyl  10 mg Rectal Daily    apixaban  2.5 mg Oral BID    DULoxetine  60 mg Oral Daily    atorvastatin  20 mg Oral Nightly    hydrALAZINE  25 mg Oral BID    lamoTRIgine  50 mg Oral BID    metoprolol tartrate  50 mg Oral BID    montelukast  10 mg Oral Nightly    NIFEdipine  60 mg Oral Daily    propafenone  150 mg Oral 3 times per day    sodium chloride flush  10 mL Intravenous 2 times per day    lidocaine  1 patch Transdermal Daily    ipratropium-albuterol  1 vial Inhalation 4x daily    fluticasone  2 puff Inhalation BID       Continuous Infusions:   dextrose         PRN Meds:  oxyCODONE-acetaminophen **OR** oxyCODONE-acetaminophen, [[id number]]

## 2023-06-06 NOTE — PROGRESS NOTES
1.3*       Cultures:  None    Films:  CT CHEST 2/29/20  Impression   Dependent airspace disease bilaterally, right greater than left.  Associated   small effusion on the right is noted.  Findings may be related to   atelectasis.  A component of contusion in the right lung base would be   difficult to exclude given the history of recent trauma.       No pneumothorax.       Small mediastinal and retrocrural nodes     Abdominal CT 3/4/2020   Impression:        Marked distension of the ascending and transverse colon with mild gradual  decompression distally.  Air-fluid levels are present throughout.  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 As the distal rectum/anus is outside the field of view, recommend rectal exam  to ensure there is no distal mass. Colonic diverticulosis. Small bilateral effusions, increased since 02/29/2020. CXR 3/3/20  FINDINGS:   Small to moderate pleural effusions bilaterally, greater on the left, new.    Pulmonary vascular congestion centrally.  No pneumothorax.  No acute bony   abnormality.           Impression   New bilateral pleural effusions.           ASSESSMENT:  · Fall  · Right lateral inferior chest wall pain  · Pulmonary contusion  · Pleural effusions -remains small on follow-up CT imaging  · Pleuritic chest pain  · Paroxysmal atrial fibrillation on Eliquis previously, restarted now  · Asthma treated by PCP, no acute exacerbation  · Ileus      PLAN:  · Inhaled corticosteroid and Singulair for asthma  · Scheduled bronchodilators  · Incentive spirometry  · Lidocaine patch  · Lasix per internal medicine [de-identified] : 37F presenting for tonsil issues. Has had issues ever since when she was younger but never got them removed. Has had 2 infections requiring antibiotics, most recently about 3 weeks ago. Reports on average has about 2 a year. Also reports issues with snoring and swallowing large pills. Currently has bad taste in her mouth. Uses mouthwashes but still reoccur. Also with poor sense of smell. Able to breathe through her nose most of the time.

## 2023-12-21 NOTE — PROGRESS NOTES
RT Inhaler-Nebulizer Bronchodilator Protocol Note    There is a bronchodilator order in the chart from a provider indicating to follow the RT Bronchodilator Protocol and there is an Initiate RT Inhaler-Nebulizer Bronchodilator Protocol order as well (see protocol at bottom of note). CXR Findings:  No results found. The findings from the last RT Protocol Assessment were as follows:   History Pulmonary Disease: Chronic pulmonary disease  Respiratory Pattern: Mild dyspnea at rest, irregular pattern, or RR 21-25 bpm  Breath Sounds: Inspiratory and expiratory or bilateral wheezing and/or rhonchi  Cough: Weak, productive  Indication for Bronchodilator Therapy: On home bronchodilators  Bronchodilator Assessment Score: 14    Aerosolized bronchodilator medication orders have been revised according to the RT Inhaler-Nebulizer Bronchodilator Protocol below. Respiratory Therapist to perform RT Therapy Protocol Assessment initially then follow the protocol. Repeat RT Therapy Protocol Assessment PRN for score 0-3 or on second treatment, BID, and PRN for scores above 3. No Indications - adjust the frequency to every 6 hours PRN wheezing or bronchospasm, if no treatments needed after 48 hours then discontinue using Per Protocol order mode. If indication present, adjust the RT bronchodilator orders based on the Bronchodilator Assessment Score as indicated below. Use Inhaler orders unless patient has one or more of the following: on home nebulizer, not able to hold breath for 10 seconds, is not alert and oriented, cannot activate and use MDI correctly, or respiratory rate 25 breaths per minute or more, then use the equivalent nebulizer order(s) with same Frequency and PRN reasons based on the score. If a patient is on this medication at home then do not decrease Frequency below that used at home.     0-3 - enter or revise RT bronchodilator order(s) to equivalent RT Bronchodilator order with Frequency of every 4 hours PRN for wheezing or increased work of breathing using Per Protocol order mode. 4-6 - enter or revise RT Bronchodilator order(s) to two equivalent RT bronchodilator orders with one order with BID Frequency and one order with Frequency of every 4 hours PRN wheezing or increased work of breathing using Per Protocol order mode. 7-10 - enter or revise RT Bronchodilator order(s) to two equivalent RT bronchodilator orders with one order with TID Frequency and one order with Frequency of every 4 hours PRN wheezing or increased work of breathing using Per Protocol order mode. 11-13 - enter or revise RT Bronchodilator order(s) to one equivalent RT bronchodilator order with QID Frequency and an Albuterol order with Frequency of every 4 hours PRN wheezing or increased work of breathing using Per Protocol order mode. Greater than 13 - enter or revise RT Bronchodilator order(s) to one equivalent RT bronchodilator order with every 4 hours Frequency and an Albuterol order with Frequency of every 2 hours PRN wheezing or increased work of breathing using Per Protocol order mode. RT to enter RT Home Evaluation for COPD & MDI Assessment order using Per Protocol order mode.     Electronically signed by Ishmael Araujo RCP on 7/6/2022 at 8:06 AM No

## 2025-03-27 NOTE — PROGRESS NOTES
Shift assessment, completed, see flow sheet. Pt is alert and oriented x 1. Following commands. Intubated and sedated with a # 8 ETT, at 23 LL. On AC 20 / 385 /35 %/ 5. SR 92, /84, SpO2 98%. Respirations are easy, even, and unlabored. Bilateral lung sounds diminished. OG in place at [address], with TF on hold. PICC WNL with Fentanyl and Precedex infusing. Zimmerman in place and patent with STAT lock. Bilateral soft wrist restraints in place for pt safety. Call light within reach. Bed in lowest position. Bed alarm on. right lower extremity:  no erythema, swelling or drainage  sensation intact   DP 2+, brisk cap refill, EHL/FHL/TA/GS 5/5  Rest of PE per MD clearance

## 2025-05-30 NOTE — FLOWSHEET NOTE
RECEIVING UNIT ED HANDOFF REVIEW    ED Nurse Handoff Report was reviewed by: Cheryl Narayan, HEDY on May 29, 2025 at 9:40 PM       Speech Language Pathology     [place] [id number]. [facility], [address]  Phone: [phone]  Fax [phone]      Speech-Language Pathology  Daily Treatment Note    Date:  2021    Patient Name:  Mode Hartmann    :  1940  MRN: [id number]  Restrictions/Precautions:  Dysphagia, aspiration  Diagnosis:    Visit Diagnoses        Codes     Oropharyngeal dysphagia    -  Primary R13.12     Idiopathic Parkinson's disease ([facility] [address]     G20     Treatment Diagnosis:  Moderate oropharyngeal dysphagia  Insurance/Certification information:  Primary: Medicare, Secondary: Federico Maloney   Referring Physician:  Rosi Gatica NP  Plan of care signed (Y/N):  Y  Visit# / total visits:   Pain level: 0/10     Progress Note: [x]  Yes  []  No  Next due by:     Subjective: The pt was accompanied by her  like usual. She reported continued difficulty with ground beef in hamburgers and foods with small fine pieces. Objective:   VitalStim (NMES) placed via 3a placement for 25 minutes to target overall improved laryngeal movement during the swallow and swallow initiation timing. Up to 8.0  mA of current was used. Electrodes were also placed bilaterally on her face over the parotid gland area due to continued report of oral dryness. Up to 4.5 mA of current was used in this area with improved saliva production reported. Electrodes were also placed via 3b placement to improve pharyngeal constriction for 13 minutes, up to 13.0 mA current was used. She tolerated NMES/VitalStim with no pain or reported discomfort. Short-term Goals:  Goal 1: The pt will tolerate regular solids without s/s of aspiration or other form of significant dysphagia 10/10  - Mixed nuts: 4/5 tolerated without s/s of aspiration; Prolonged mastication; moderate coughing and gag noted after the 2nd trial which was an almond. Vonzella Goodpasture Goal 2: The pt will tolerate thin liquids without s/s of aspiration or other form of significant dysphagia 10/10  - Thin water via cup: 20 of 20 tolerated; No overt s/s of aspiration. GOAL MET    Other Treatments:  Several laryngeal / pharyngeal strengthening and TBR exercises were completed this date (completed while VitalStim in place)  -Shiela: x 5 reps  -Tongue tip to roof of mouth, hold, and swallow: x 5 reps  -Mendelsohn: x 5 reps  -Tongue tip to roof of mouth, hold with pressure: x 5 reps  -Falsetto 'ee': x 5 reps    - Bolus control exercises: not targeted this session. Recommendations:  Solid: Diet Solids Recommendation: Regular(Avoid tough, chewy, dry, particulate and stringy foods)  Liquid: Liquid Consistency Recommendation: Thin  Medication:Recommended Form of Meds: Meds in puree    Assessment:   The pt demonstrates improvement overall, however is still having difficulty with some regular solids.      Plan: ST to continue POC for 2x/week for 6 weeks    Total Treatment Time / Charges     Time in Time out Total Time / units   Cognitive Tx      Speech Tx      Dysphagia Tx 0845 0937 60 min / 1 unit       Signature:   Pedro Johnson, Speech Therapy       Mikey L[facility]pez [facility]  GP[address][id number]  Speech-Language Pathologist  Phone: [phone]  Speech Desk: [phone]

## (undated) DEVICE — TUBE TRACH AD SZ 8 L79MM OD122MM ID85MM DK BLU PVC CUF CANN

## (undated) DEVICE — MAJOR SET UP PK

## (undated) DEVICE — ELECTRODE ECG MONITR FOAM TEAR DROP ADLT RED

## (undated) DEVICE — SUTURE PERMAHAND SZ 2-0 L18IN NONABSORBABLE BLK L26MM SH C012D

## (undated) DEVICE — GASTROSTOMY TUBE WITH ENFIT CONNECTOR 22FR: Brand: GASTROSTOMY TUBE WITH ENFIT CONNECTOR

## (undated) DEVICE — SINGLE USE BIOPSY VALVE MAJ-210: Brand: SINGLE USE BIOPSY VALVE (STERILE)

## (undated) DEVICE — GOWN,AURORA,NONREINF,RAGLAN,XXL,STERILE: Brand: MEDLINE

## (undated) DEVICE — SYRINGE MED 50ML LUERSLIP TIP

## (undated) DEVICE — SUTURE PROL 2-0 L48IN NONABSORBABLE BLU SH L26MM 1/2 CIR 8533H

## (undated) DEVICE — SAFTEY SINGLE PASS PEG KIT WITH ENFIT™ CONNECTION: Brand: KANGAROO

## (undated) DEVICE — ELECTRODE PT RET AD L9FT HI MOIST COND ADH HYDRGEL CORDED

## (undated) DEVICE — SINGLE USE SUCTION VALVE MAJ-209: Brand: SINGLE USE SUCTION VALVE (STERILE)

## (undated) DEVICE — GLOVE ORANGE PI 7 1/2   MSG9075

## (undated) DEVICE — SHEET,DRAPE,40X58,STERILE: Brand: MEDLINE

## (undated) DEVICE — SYRINGE MED 10ML SLIP TIP BLNT FILL AND LUERLOCK DISP

## (undated) DEVICE — CONMED SCOPE SAVER BITE BLOCK, 20X27 MM: Brand: SCOPE SAVER

## (undated) DEVICE — APPLICATOR MEDICATED 26 CC SOLUTION HI LT ORNG CHLORAPREP

## (undated) DEVICE — ENDO CARRY-ON PROCEDURE KIT INCLUDES SUCTION TUBING, LUBRICANT, GAUZE, BIOHAZARD STICKER, TRANSPORT PAD AND INTERCEPT BEDSIDE KIT.: Brand: ENDO CARRY-ON PROCEDURE KIT

## (undated) DEVICE — SYRINGE MED 10ML LUERLOCK TIP W/O SFTY DISP

## (undated) DEVICE — 14FR COLON DECOMPRESSION SET: Brand: COOK

## (undated) DEVICE — SOLUTION IV IRRIG 500ML 0.9% SODIUM CHL 2F7123